# Patient Record
Sex: MALE | Race: WHITE | Employment: OTHER | URBAN - METROPOLITAN AREA
[De-identification: names, ages, dates, MRNs, and addresses within clinical notes are randomized per-mention and may not be internally consistent; named-entity substitution may affect disease eponyms.]

---

## 2019-08-08 LAB — DIABETIC RETINOPATHY: NEGATIVE

## 2020-05-07 LAB
AVERAGE GLUCOSE: NORMAL
HBA1C MFR BLD: 6.1 %

## 2020-06-15 ENCOUNTER — HOSPITAL ENCOUNTER (EMERGENCY)
Age: 60
Discharge: HOME | End: 2020-06-15
Payer: MEDICARE

## 2020-06-15 VITALS
BODY MASS INDEX: 27.16 KG/M2 | OXYGEN SATURATION: 99 % | HEART RATE: 95 BPM | WEIGHT: 194 LBS | SYSTOLIC BLOOD PRESSURE: 153 MMHG | HEIGHT: 71 IN | DIASTOLIC BLOOD PRESSURE: 82 MMHG | RESPIRATION RATE: 18 BRPM | TEMPERATURE: 97.6 F

## 2020-06-15 PROCEDURE — 6370000000 HC RX 637 (ALT 250 FOR IP): Performed by: PHYSICIAN ASSISTANT

## 2020-06-15 PROCEDURE — 99283 EMERGENCY DEPT VISIT LOW MDM: CPT

## 2020-06-15 PROCEDURE — 30903 CONTROL OF NOSEBLEED: CPT

## 2020-06-15 RX ORDER — ASPIRIN 81 MG/1
81 TABLET ORAL DAILY
COMMUNITY

## 2020-06-15 RX ORDER — OXYMETAZOLINE HYDROCHLORIDE 0.05 G/100ML
2 SPRAY NASAL ONCE
Status: COMPLETED | OUTPATIENT
Start: 2020-06-15 | End: 2020-06-15

## 2020-06-15 RX ORDER — HYDROCHLOROTHIAZIDE 25 MG/1
25 TABLET ORAL DAILY
COMMUNITY
End: 2022-06-17 | Stop reason: SDUPTHER

## 2020-06-15 RX ORDER — DOXYCYCLINE HYCLATE 100 MG
100 TABLET ORAL 2 TIMES DAILY
Qty: 14 TABLET | Refills: 0 | Status: SHIPPED | OUTPATIENT
Start: 2020-06-15 | End: 2020-06-22

## 2020-06-15 RX ADMIN — Medication 2 SPRAY: at 19:46

## 2020-06-15 NOTE — ED PROVIDER NOTES
Medications     Discharge Medication List as of 6/15/2020  8:35 PM      START taking these medications    Details   doxycycline hyclate (VIBRA-TABS) 100 MG tablet Take 1 tablet by mouth 2 times daily for 7 days, Disp-14 tablet, R-0Print           Electronically signed by Darrius Sosa PA-C   DD: 6/15/20  **This report was transcribed using voice recognition software. Every effort was made to ensure accuracy; however, inadvertent computerized transcription errors may be present.   END OF ED PROVIDER NOTE        Darrius Sosa PA-C  06/15/20 4910

## 2020-06-15 NOTE — ED NOTES
Bed: 25  Expected date:   Expected time:   Means of arrival:   Comments:  [address], RN  06/15/20 7304

## 2020-06-16 ENCOUNTER — TELEPHONE (OUTPATIENT)
Dept: ADMINISTRATIVE | Age: 60
End: 2020-06-16

## 2020-06-16 NOTE — TELEPHONE ENCOUNTER
Patient seen in Ed for nose bleeds had a balloon placed inside and needs seen with in 2-3 days.  Best call back is [phone]

## 2020-06-18 ENCOUNTER — OFFICE VISIT (OUTPATIENT)
Dept: ENT CLINIC | Age: 60
End: 2020-06-18
Payer: MEDICARE

## 2020-06-18 VITALS — BODY MASS INDEX: 27.16 KG/M2 | TEMPERATURE: 98.6 F | HEIGHT: 71 IN | WEIGHT: 194 LBS

## 2020-06-18 PROCEDURE — 1036F TOBACCO NON-USER: CPT | Performed by: OTOLARYNGOLOGY

## 2020-06-18 PROCEDURE — G8419 CALC BMI OUT NRM PARAM NOF/U: HCPCS | Performed by: OTOLARYNGOLOGY

## 2020-06-18 PROCEDURE — 3017F COLORECTAL CA SCREEN DOC REV: CPT | Performed by: OTOLARYNGOLOGY

## 2020-06-18 PROCEDURE — G8427 DOCREV CUR MEDS BY ELIG CLIN: HCPCS | Performed by: OTOLARYNGOLOGY

## 2020-06-18 PROCEDURE — 99204 OFFICE O/P NEW MOD 45 MIN: CPT | Performed by: OTOLARYNGOLOGY

## 2020-06-18 SDOH — HEALTH STABILITY: MENTAL HEALTH: HOW MANY STANDARD DRINKS CONTAINING ALCOHOL DO YOU HAVE ON A TYPICAL DAY?: 5 OR 6

## 2020-06-18 SDOH — HEALTH STABILITY: MENTAL HEALTH: HOW OFTEN DO YOU HAVE A DRINK CONTAINING ALCOHOL?: 4 OR MORE TIMES A WEEK

## 2020-06-18 ASSESSMENT — ENCOUNTER SYMPTOMS
VOMITING: 0
ALLERGIC/IMMUNOLOGIC NEGATIVE: 1
NAUSEA: 0
EYE REDNESS: 0
COLOR CHANGE: 0
STRIDOR: 0
SHORTNESS OF BREATH: 0
COUGH: 0
EYE DISCHARGE: 0

## 2020-08-07 LAB
AVERAGE GLUCOSE: NORMAL
HBA1C MFR BLD: 6.2 %

## 2020-11-06 LAB
AVERAGE GLUCOSE: NORMAL
HBA1C MFR BLD: 6.5 %

## 2020-12-26 ENCOUNTER — APPOINTMENT (OUTPATIENT)
Dept: GENERAL RADIOLOGY | Age: 60
DRG: 871 | End: 2020-12-26
Payer: MEDICARE

## 2020-12-26 ENCOUNTER — HOSPITAL ENCOUNTER (INPATIENT)
Age: 60
LOS: 10 days | Discharge: ANOTHER HOSPITAL | DRG: 871 | End: 2021-01-05
Attending: EMERGENCY MEDICINE | Admitting: INTERNAL MEDICINE
Payer: MEDICARE

## 2020-12-26 DIAGNOSIS — E87.1 HYPONATREMIA: Primary | ICD-10-CM

## 2020-12-26 DIAGNOSIS — R29.6 FREQUENT FALLS: ICD-10-CM

## 2020-12-26 LAB
ANION GAP SERPL CALCULATED.3IONS-SCNC: 11 MMOL/L (ref 7–16)
ANION GAP SERPL CALCULATED.3IONS-SCNC: 12 MMOL/L (ref 7–16)
ANION GAP SERPL CALCULATED.3IONS-SCNC: 12 MMOL/L (ref 7–16)
ANION GAP SERPL CALCULATED.3IONS-SCNC: 14 MMOL/L (ref 7–16)
ANION GAP SERPL CALCULATED.3IONS-SCNC: 20 MMOL/L (ref 7–16)
BUN BLDV-MCNC: 10 MG/DL (ref 8–23)
BUN BLDV-MCNC: 10 MG/DL (ref 8–23)
BUN BLDV-MCNC: 13 MG/DL (ref 8–23)
BUN BLDV-MCNC: 23 MG/DL (ref 8–23)
BUN BLDV-MCNC: 9 MG/DL (ref 8–23)
CALCIUM SERPL-MCNC: 7.9 MG/DL (ref 8.6–10.2)
CALCIUM SERPL-MCNC: 8.1 MG/DL (ref 8.6–10.2)
CALCIUM SERPL-MCNC: 8.8 MG/DL (ref 8.6–10.2)
CHLORIDE BLD-SCNC: 63 MMOL/L (ref 98–107)
CHLORIDE BLD-SCNC: 67 MMOL/L (ref 98–107)
CHLORIDE BLD-SCNC: 68 MMOL/L (ref 98–107)
CHLORIDE BLD-SCNC: 71 MMOL/L (ref 98–107)
CHLORIDE BLD-SCNC: 74 MMOL/L (ref 98–107)
CHP ED QC CHECK: YES
CHP ED QC CHECK: YES
CK MB: 22.1 NG/ML (ref 0–7.7)
CK MB: 27.7 NG/ML (ref 0–7.7)
CO2: 19 MMOL/L (ref 22–29)
CO2: 20 MMOL/L (ref 22–29)
CO2: 22 MMOL/L (ref 22–29)
CREAT SERPL-MCNC: 0.6 MG/DL (ref 0.7–1.2)
CREAT SERPL-MCNC: 0.7 MG/DL (ref 0.7–1.2)
CREAT SERPL-MCNC: 0.7 MG/DL (ref 0.7–1.2)
CREAT SERPL-MCNC: 0.8 MG/DL (ref 0.7–1.2)
CREAT SERPL-MCNC: 1.3 MG/DL (ref 0.7–1.2)
EKG ATRIAL RATE: 92 BPM
EKG P AXIS: 19 DEGREES
EKG P-R INTERVAL: 176 MS
EKG Q-T INTERVAL: 388 MS
EKG QRS DURATION: 80 MS
EKG QTC CALCULATION (BAZETT): 479 MS
EKG R AXIS: 19 DEGREES
EKG T AXIS: 17 DEGREES
EKG VENTRICULAR RATE: 92 BPM
GFR AFRICAN AMERICAN: >60
GFR NON-AFRICAN AMERICAN: 56 ML/MIN/1.73
GFR NON-AFRICAN AMERICAN: >60 ML/MIN/1.73
GLUCOSE BLD-MCNC: 144 MG/DL (ref 74–99)
GLUCOSE BLD-MCNC: 181 MG/DL (ref 74–99)
GLUCOSE BLD-MCNC: 187 MG/DL (ref 74–99)
GLUCOSE BLD-MCNC: 190 MG/DL (ref 74–99)
GLUCOSE BLD-MCNC: 192 MG/DL
GLUCOSE BLD-MCNC: 199 MG/DL (ref 74–99)
GLUCOSE BLD-MCNC: 207 MG/DL
HBA1C MFR BLD: 5.5 % (ref 4–5.6)
HCT VFR BLD CALC: 25.5 % (ref 37–54)
HCT VFR BLD CALC: 29.1 % (ref 37–54)
HEMOGLOBIN: 10.6 G/DL (ref 12.5–16.5)
HEMOGLOBIN: 9.4 G/DL (ref 12.5–16.5)
LACTIC ACID: 4.9 MMOL/L (ref 0.5–2.2)
MCH RBC QN AUTO: 28.5 PG (ref 26–35)
MCH RBC QN AUTO: 28.8 PG (ref 26–35)
MCHC RBC AUTO-ENTMCNC: 36.4 % (ref 32–34.5)
MCHC RBC AUTO-ENTMCNC: 36.9 % (ref 32–34.5)
MCV RBC AUTO: 78.2 FL (ref 80–99.9)
MCV RBC AUTO: 78.2 FL (ref 80–99.9)
METER GLUCOSE: 192 MG/DL (ref 74–99)
METER GLUCOSE: 207 MG/DL (ref 74–99)
METER GLUCOSE: 210 MG/DL (ref 74–99)
OSMOLALITY URINE: 565 MOSM/KG (ref 300–900)
OSMOLALITY URINE: 602 MOSM/KG (ref 300–900)
OSMOLALITY: 226 MOSM/KG (ref 285–310)
PDW BLD-RTO: 13.4 FL (ref 11.5–15)
PDW BLD-RTO: 13.6 FL (ref 11.5–15)
PLATELET # BLD: 160 E9/L (ref 130–450)
PLATELET # BLD: 162 E9/L (ref 130–450)
PMV BLD AUTO: 9.2 FL (ref 7–12)
PMV BLD AUTO: 9.8 FL (ref 7–12)
POTASSIUM REFLEX MAGNESIUM: 3.7 MMOL/L (ref 3.5–5)
POTASSIUM REFLEX MAGNESIUM: 3.8 MMOL/L (ref 3.5–5)
POTASSIUM REFLEX MAGNESIUM: 3.9 MMOL/L (ref 3.5–5)
POTASSIUM SERPL-SCNC: 3.7 MMOL/L (ref 3.5–5)
POTASSIUM SERPL-SCNC: 3.9 MMOL/L (ref 3.5–5)
PRO-BNP: 824 PG/ML (ref 0–125)
RBC # BLD: 3.26 E12/L (ref 3.8–5.8)
RBC # BLD: 3.72 E12/L (ref 3.8–5.8)
SARS-COV-2, NAAT: NOT DETECTED
SODIUM BLD-SCNC: 102 MMOL/L (ref 132–146)
SODIUM BLD-SCNC: 102 MMOL/L (ref 132–146)
SODIUM BLD-SCNC: 103 MMOL/L (ref 132–146)
SODIUM BLD-SCNC: 104 MMOL/L (ref 132–146)
SODIUM BLD-SCNC: 105 MMOL/L (ref 132–146)
SODIUM BLD-SCNC: 105 MMOL/L (ref 132–146)
SODIUM BLD-SCNC: 106 MMOL/L (ref 132–146)
SODIUM BLD-SCNC: 106 MMOL/L (ref 132–146)
SODIUM URINE: 30 MMOL/L
SODIUM URINE: <20 MMOL/L
TOTAL CK: 2273 U/L (ref 20–200)
TOTAL CK: 3059 U/L (ref 20–200)
TROPONIN: 0.06 NG/ML (ref 0–0.03)
TROPONIN: 0.07 NG/ML (ref 0–0.03)
TROPONIN: 0.1 NG/ML (ref 0–0.03)
TSH SERPL DL<=0.05 MIU/L-ACNC: 0.43 UIU/ML (ref 0.27–4.2)
URIC ACID, SERUM: 3.3 MG/DL (ref 3.4–7)
WBC # BLD: 11.7 E9/L (ref 4.5–11.5)
WBC # BLD: 14.2 E9/L (ref 4.5–11.5)

## 2020-12-26 PROCEDURE — 83036 HEMOGLOBIN GLYCOSYLATED A1C: CPT

## 2020-12-26 PROCEDURE — 84443 ASSAY THYROID STIM HORMONE: CPT

## 2020-12-26 PROCEDURE — 6360000002 HC RX W HCPCS: Performed by: EMERGENCY MEDICINE

## 2020-12-26 PROCEDURE — 82962 GLUCOSE BLOOD TEST: CPT

## 2020-12-26 PROCEDURE — 84295 ASSAY OF SERUM SODIUM: CPT

## 2020-12-26 PROCEDURE — 83935 ASSAY OF URINE OSMOLALITY: CPT

## 2020-12-26 PROCEDURE — 82550 ASSAY OF CK (CPK): CPT

## 2020-12-26 PROCEDURE — 73030 X-RAY EXAM OF SHOULDER: CPT

## 2020-12-26 PROCEDURE — 6370000000 HC RX 637 (ALT 250 FOR IP): Performed by: INTERNAL MEDICINE

## 2020-12-26 PROCEDURE — 99223 1ST HOSP IP/OBS HIGH 75: CPT | Performed by: INTERNAL MEDICINE

## 2020-12-26 PROCEDURE — 2580000003 HC RX 258: Performed by: EMERGENCY MEDICINE

## 2020-12-26 PROCEDURE — 6370000000 HC RX 637 (ALT 250 FOR IP): Performed by: NURSE PRACTITIONER

## 2020-12-26 PROCEDURE — 2000000000 HC ICU R&B

## 2020-12-26 PROCEDURE — 83880 ASSAY OF NATRIURETIC PEPTIDE: CPT

## 2020-12-26 PROCEDURE — 6360000002 HC RX W HCPCS

## 2020-12-26 PROCEDURE — 71046 X-RAY EXAM CHEST 2 VIEWS: CPT

## 2020-12-26 PROCEDURE — 96374 THER/PROPH/DIAG INJ IV PUSH: CPT

## 2020-12-26 PROCEDURE — 84484 ASSAY OF TROPONIN QUANT: CPT

## 2020-12-26 PROCEDURE — 36415 COLL VENOUS BLD VENIPUNCTURE: CPT

## 2020-12-26 PROCEDURE — 93005 ELECTROCARDIOGRAM TRACING: CPT | Performed by: EMERGENCY MEDICINE

## 2020-12-26 PROCEDURE — 87081 CULTURE SCREEN ONLY: CPT

## 2020-12-26 PROCEDURE — 2580000003 HC RX 258: Performed by: INTERNAL MEDICINE

## 2020-12-26 PROCEDURE — 83605 ASSAY OF LACTIC ACID: CPT

## 2020-12-26 PROCEDURE — 84550 ASSAY OF BLOOD/URIC ACID: CPT

## 2020-12-26 PROCEDURE — 84300 ASSAY OF URINE SODIUM: CPT

## 2020-12-26 PROCEDURE — 87040 BLOOD CULTURE FOR BACTERIA: CPT

## 2020-12-26 PROCEDURE — 85027 COMPLETE CBC AUTOMATED: CPT

## 2020-12-26 PROCEDURE — 83930 ASSAY OF BLOOD OSMOLALITY: CPT

## 2020-12-26 PROCEDURE — U0002 COVID-19 LAB TEST NON-CDC: HCPCS

## 2020-12-26 PROCEDURE — 80048 BASIC METABOLIC PNL TOTAL CA: CPT

## 2020-12-26 PROCEDURE — 99221 1ST HOSP IP/OBS SF/LOW 40: CPT | Performed by: INTERNAL MEDICINE

## 2020-12-26 PROCEDURE — 6360000002 HC RX W HCPCS: Performed by: INTERNAL MEDICINE

## 2020-12-26 PROCEDURE — 99285 EMERGENCY DEPT VISIT HI MDM: CPT

## 2020-12-26 PROCEDURE — 82553 CREATINE MB FRACTION: CPT

## 2020-12-26 RX ORDER — ASPIRIN 81 MG/1
81 TABLET ORAL DAILY
Status: DISCONTINUED | OUTPATIENT
Start: 2020-12-26 | End: 2021-01-05 | Stop reason: HOSPADM

## 2020-12-26 RX ORDER — ONDANSETRON 2 MG/ML
INJECTION INTRAMUSCULAR; INTRAVENOUS
Status: COMPLETED
Start: 2020-12-26 | End: 2020-12-26

## 2020-12-26 RX ORDER — PANTOPRAZOLE SODIUM 40 MG/1
40 TABLET, DELAYED RELEASE ORAL
Status: DISCONTINUED | OUTPATIENT
Start: 2020-12-27 | End: 2020-12-27

## 2020-12-26 RX ORDER — LORAZEPAM 2 MG/ML
2 INJECTION INTRAMUSCULAR
Status: DISCONTINUED | OUTPATIENT
Start: 2020-12-26 | End: 2020-12-30

## 2020-12-26 RX ORDER — SODIUM CHLORIDE 0.9 % (FLUSH) 0.9 %
10 SYRINGE (ML) INJECTION EVERY 12 HOURS SCHEDULED
Status: DISCONTINUED | OUTPATIENT
Start: 2020-12-26 | End: 2021-01-05 | Stop reason: HOSPADM

## 2020-12-26 RX ORDER — PROMETHAZINE HYDROCHLORIDE 25 MG/1
12.5 TABLET ORAL EVERY 6 HOURS PRN
Status: DISCONTINUED | OUTPATIENT
Start: 2020-12-26 | End: 2021-01-05 | Stop reason: HOSPADM

## 2020-12-26 RX ORDER — LORAZEPAM 2 MG/ML
3 INJECTION INTRAMUSCULAR
Status: DISCONTINUED | OUTPATIENT
Start: 2020-12-26 | End: 2020-12-30

## 2020-12-26 RX ORDER — LORAZEPAM 2 MG/ML
1 INJECTION INTRAMUSCULAR
Status: DISCONTINUED | OUTPATIENT
Start: 2020-12-26 | End: 2020-12-30

## 2020-12-26 RX ORDER — 3% SODIUM CHLORIDE 3 G/100ML
35 INJECTION, SOLUTION INTRAVENOUS CONTINUOUS
Status: DISCONTINUED | OUTPATIENT
Start: 2020-12-26 | End: 2020-12-27

## 2020-12-26 RX ORDER — CLOPIDOGREL BISULFATE 75 MG/1
75 TABLET ORAL DAILY
Status: DISCONTINUED | OUTPATIENT
Start: 2020-12-26 | End: 2021-01-05 | Stop reason: HOSPADM

## 2020-12-26 RX ORDER — DEXTROSE MONOHYDRATE 50 MG/ML
100 INJECTION, SOLUTION INTRAVENOUS PRN
Status: DISCONTINUED | OUTPATIENT
Start: 2020-12-26 | End: 2020-12-27 | Stop reason: SDUPTHER

## 2020-12-26 RX ORDER — SODIUM CHLORIDE 9 MG/ML
INJECTION, SOLUTION INTRAVENOUS CONTINUOUS
Status: DISCONTINUED | OUTPATIENT
Start: 2020-12-26 | End: 2020-12-27

## 2020-12-26 RX ORDER — GAUZE BANDAGE 2" X 2"
100 BANDAGE TOPICAL DAILY
Status: DISCONTINUED | OUTPATIENT
Start: 2020-12-26 | End: 2021-01-05 | Stop reason: HOSPADM

## 2020-12-26 RX ORDER — ACETAMINOPHEN 325 MG/1
650 TABLET ORAL EVERY 6 HOURS PRN
Status: DISCONTINUED | OUTPATIENT
Start: 2020-12-26 | End: 2021-01-05 | Stop reason: HOSPADM

## 2020-12-26 RX ORDER — LORAZEPAM 2 MG/ML
4 INJECTION INTRAMUSCULAR
Status: DISCONTINUED | OUTPATIENT
Start: 2020-12-26 | End: 2020-12-30

## 2020-12-26 RX ORDER — MAGNESIUM HYDROXIDE/ALUMINUM HYDROXICE/SIMETHICONE 120; 1200; 1200 MG/30ML; MG/30ML; MG/30ML
15 SUSPENSION ORAL ONCE
Status: COMPLETED | OUTPATIENT
Start: 2020-12-26 | End: 2020-12-26

## 2020-12-26 RX ORDER — ACETAMINOPHEN 650 MG/1
650 SUPPOSITORY RECTAL EVERY 6 HOURS PRN
Status: DISCONTINUED | OUTPATIENT
Start: 2020-12-26 | End: 2021-01-05 | Stop reason: HOSPADM

## 2020-12-26 RX ORDER — ONDANSETRON 2 MG/ML
4 INJECTION INTRAMUSCULAR; INTRAVENOUS EVERY 6 HOURS PRN
Status: DISCONTINUED | OUTPATIENT
Start: 2020-12-26 | End: 2021-01-05 | Stop reason: HOSPADM

## 2020-12-26 RX ORDER — GLIMEPIRIDE 2 MG/1
2 TABLET ORAL
Status: DISCONTINUED | OUTPATIENT
Start: 2020-12-26 | End: 2020-12-27

## 2020-12-26 RX ORDER — DEXTROSE MONOHYDRATE 25 G/50ML
12.5 INJECTION, SOLUTION INTRAVENOUS PRN
Status: DISCONTINUED | OUTPATIENT
Start: 2020-12-26 | End: 2020-12-27 | Stop reason: SDUPTHER

## 2020-12-26 RX ORDER — 0.9 % SODIUM CHLORIDE 0.9 %
1000 INTRAVENOUS SOLUTION INTRAVENOUS ONCE
Status: COMPLETED | OUTPATIENT
Start: 2020-12-26 | End: 2020-12-26

## 2020-12-26 RX ORDER — LORAZEPAM 1 MG/1
3 TABLET ORAL
Status: DISCONTINUED | OUTPATIENT
Start: 2020-12-26 | End: 2020-12-30

## 2020-12-26 RX ORDER — LORAZEPAM 1 MG/1
2 TABLET ORAL
Status: DISCONTINUED | OUTPATIENT
Start: 2020-12-26 | End: 2020-12-30

## 2020-12-26 RX ORDER — ATORVASTATIN CALCIUM 20 MG/1
40 TABLET, FILM COATED ORAL NIGHTLY
Status: DISCONTINUED | OUTPATIENT
Start: 2020-12-26 | End: 2021-01-05 | Stop reason: HOSPADM

## 2020-12-26 RX ORDER — NICOTINE POLACRILEX 4 MG
15 LOZENGE BUCCAL PRN
Status: DISCONTINUED | OUTPATIENT
Start: 2020-12-26 | End: 2020-12-27 | Stop reason: SDUPTHER

## 2020-12-26 RX ORDER — LORAZEPAM 1 MG/1
1 TABLET ORAL
Status: DISCONTINUED | OUTPATIENT
Start: 2020-12-26 | End: 2020-12-30

## 2020-12-26 RX ORDER — HYDROCHLOROTHIAZIDE 25 MG/1
25 TABLET ORAL DAILY
Status: DISCONTINUED | OUTPATIENT
Start: 2020-12-26 | End: 2020-12-26

## 2020-12-26 RX ORDER — LORAZEPAM 1 MG/1
4 TABLET ORAL
Status: DISCONTINUED | OUTPATIENT
Start: 2020-12-26 | End: 2020-12-30

## 2020-12-26 RX ORDER — POLYETHYLENE GLYCOL 3350 17 G/17G
17 POWDER, FOR SOLUTION ORAL DAILY PRN
Status: DISCONTINUED | OUTPATIENT
Start: 2020-12-26 | End: 2021-01-05 | Stop reason: HOSPADM

## 2020-12-26 RX ORDER — SODIUM CHLORIDE 0.9 % (FLUSH) 0.9 %
10 SYRINGE (ML) INJECTION PRN
Status: DISCONTINUED | OUTPATIENT
Start: 2020-12-26 | End: 2020-12-28 | Stop reason: SDUPTHER

## 2020-12-26 RX ADMIN — ONDANSETRON 4 MG: 2 INJECTION INTRAMUSCULAR; INTRAVENOUS at 06:06

## 2020-12-26 RX ADMIN — GLIMEPIRIDE 2 MG: 2 TABLET ORAL at 09:02

## 2020-12-26 RX ADMIN — ENOXAPARIN SODIUM 40 MG: 40 INJECTION SUBCUTANEOUS at 09:03

## 2020-12-26 RX ADMIN — ATORVASTATIN CALCIUM 40 MG: 20 TABLET, FILM COATED ORAL at 20:49

## 2020-12-26 RX ADMIN — HYDROCHLOROTHIAZIDE 25 MG: 25 TABLET ORAL at 09:02

## 2020-12-26 RX ADMIN — LORAZEPAM 3 MG: 2 INJECTION INTRAMUSCULAR; INTRAVENOUS at 23:26

## 2020-12-26 RX ADMIN — LORAZEPAM 2 MG: 2 INJECTION INTRAMUSCULAR; INTRAVENOUS at 22:26

## 2020-12-26 RX ADMIN — ACETAMINOPHEN 650 MG: 650 SUPPOSITORY RECTAL at 23:25

## 2020-12-26 RX ADMIN — ONDANSETRON HYDROCHLORIDE 4 MG: 2 SOLUTION INTRAMUSCULAR; INTRAVENOUS at 06:06

## 2020-12-26 RX ADMIN — THIAMINE HCL TAB 100 MG 100 MG: 100 TAB at 09:02

## 2020-12-26 RX ADMIN — METFORMIN HYDROCHLORIDE 500 MG: 500 TABLET ORAL at 09:02

## 2020-12-26 RX ADMIN — CLOPIDOGREL BISULFATE 75 MG: 75 TABLET ORAL at 09:02

## 2020-12-26 RX ADMIN — METFORMIN HYDROCHLORIDE 500 MG: 500 TABLET ORAL at 17:37

## 2020-12-26 RX ADMIN — VANCOMYCIN HYDROCHLORIDE 1000 MG: 1 INJECTION, POWDER, LYOPHILIZED, FOR SOLUTION INTRAVENOUS at 05:15

## 2020-12-26 RX ADMIN — SODIUM CHLORIDE: 9 INJECTION, SOLUTION INTRAVENOUS at 05:28

## 2020-12-26 RX ADMIN — CEFEPIME HYDROCHLORIDE 2 G: 2 INJECTION, POWDER, FOR SOLUTION INTRAVENOUS at 04:02

## 2020-12-26 RX ADMIN — ASPIRIN 81 MG: 81 TABLET, FILM COATED ORAL at 09:02

## 2020-12-26 RX ADMIN — Medication 10 ML: at 20:49

## 2020-12-26 RX ADMIN — ALUMINUM HYDROXIDE, MAGNESIUM HYDROXIDE, AND SIMETHICONE 15 ML: 200; 200; 20 SUSPENSION ORAL at 12:15

## 2020-12-26 RX ADMIN — SODIUM CHLORIDE 1000 ML: 9 INJECTION, SOLUTION INTRAVENOUS at 02:34

## 2020-12-26 RX ADMIN — SODIUM CHLORIDE 35 ML/HR: 3 INJECTION, SOLUTION INTRAVENOUS at 14:39

## 2020-12-26 ASSESSMENT — ENCOUNTER SYMPTOMS
ABDOMINAL PAIN: 0
BACK PAIN: 0
EYE DISCHARGE: 0
COUGH: 0
WHEEZING: 0
NAUSEA: 1
SORE THROAT: 0
EYE PAIN: 0
SINUS PRESSURE: 0
EYE REDNESS: 0
VOMITING: 1
SHORTNESS OF BREATH: 1
DIARRHEA: 0

## 2020-12-26 ASSESSMENT — PAIN SCALES - GENERAL
PAINLEVEL_OUTOF10: 0
PAINLEVEL_OUTOF10: 8
PAINLEVEL_OUTOF10: 0

## 2020-12-26 ASSESSMENT — PAIN DESCRIPTION - LOCATION: LOCATION: CHEST

## 2020-12-26 ASSESSMENT — PAIN DESCRIPTION - DESCRIPTORS: DESCRIPTORS: OTHER (COMMENT)

## 2020-12-26 NOTE — H&P
[address] [place], [place] [address]ist Group   History and Physical      CHIEF COMPLAINT: History of fall at home    History of Present Illness:  63 y.o. male with a history of CVA with left-sided weakness, diabetes mellitus and hyperlipidemia presents with a fall at home. History taken from the patient at the bedside, he fall yesterday evening and hit his upper cheek. He did not give any history of headache. Did not give any history of fever, cough or any chest pain. His vitals in ER blood pressure 146/77, pulse 95, respiration 24, temperature 98 °F.  His labs in ER shows sodium 102, chloride 63 and bicarb 19, creatinine 0.6 and WBC count 11.7. Informant(s) for H&P: Patient and EMR    REVIEW OF SYSTEMS:  no fevers, chills, cp, sob, n/v, ha, vision/hearing changes, wt changes, hot/cold flashes, other open skin lesions, diarrhea, constipation, dysuria/hematuria unless noted in HPI. Complete ROS performed with the patient and is otherwise negative. PMH:  Past Medical History:   Diagnosis Date    [facility]erebral [facility] occlusion with [facility] infarction ([facility]     Diabetes mellitus ([facility]     Type 2    Hypertension        Surgical History:  Past Surgical History:   Procedure Laterality Date    FINGER AMPUTATION      KNEE ARTHROSCOPY      TONSILLECTOMY         Medications Prior to Admission:    Prior to Admission medications    Medication Sig Start Date End Date Taking?  Authorizing Provider   aspirin 81 MG EC tablet Take 81 mg by mouth daily    Historical Provider, MD   hydroCHLOROthiazide (HYDRODIURIL) 25 MG tablet Take 25 mg by mouth daily    Historical Provider, MD   acetaminophen 650 MG TABS Take 650 mg by mouth every 4 hours as needed 11/16/15   Deana Rm, DO   glimepiride (AMARYL) 2 MG tablet Take 1 tablet by mouth daily (with breakfast) 11/16/15   Deana Rm, DO   insulin lispro (HUMALOG) 100 UNIT/ML injection vial Inject 0-12 Units into the skin 3 times daily (with meals) Patient not taking: Reported on 6/18/2020 11/16/15   Ariela Lawler, DO   insulin lispro (HUMALOG) 100 UNIT/ML injection vial Inject 0-6 Units into the skin nightly  Patient not taking: Reported on 6/18/2020 11/16/15   Ariela Lawler, DO   metFORMIN (GLUCOPHAGE) 500 MG tablet Take 1 tablet by mouth 2 times daily (with meals) 11/16/15   Ariela Lawler, DO   atorvastatin (LIPITOR) 40 MG tablet Take 1 tablet by mouth nightly 11/16/15   Ariela Lawler, DO   clopidogrel (PLAVIX) 75 MG tablet Take 1 tablet by mouth daily  Patient not taking: Reported on 6/18/2020 11/16/15   Ariela Lawler, DO   vitamin B-1 100 MG tablet Take 1 tablet by mouth daily  Patient not taking: Reported on 6/18/2020 11/16/15   Ariela Lawler DO       Allergies:    Pcn [penicillins]    Social History:    reports that he has quit smoking. He has never used smokeless tobacco. He reports current alcohol use. He reports that he does not use drugs. Family History:   family history is not on file. Significant medical history in his family    PHYSICAL EXAM:  Vitals:  BP (!) 146/77   Pulse 95   Temp 98 °F (36.7 °C) (Oral)   Resp 24   SpO2 96%     General Appearance: alert and oriented to person, place and time and in no acute distress  Skin: Bruits and the zygomatic process left side.   Head: normocephalic and atraumatic  Eyes: pupils equal, round, and reactive to light, extraocular eye movements intact, conjunctivae normal  Neck: neck supple and non tender without mass   Pulmonary/Chest: clear to auscultation bilaterally- no wheezes, rales or rhonchi, normal air movement, no respiratory distress  Cardiovascular: normal rate, normal S1 and S2 and no carotid bruits  Abdomen: soft, non-tender, non-distended, normal bowel sounds, no masses or organomegaly  Extremities: no cyanosis, no clubbing and no edema  Neurologic: no cranial nerve deficit and speech normal    LABS:  Recent Labs     12/26/20  0124   *   K 3.7   CL 63* CO2 19*   BUN 9   CREATININE 0.6*   GLUCOSE 144*   CALCIUM 8.8       Recent Labs     12/26/20  0124   WBC 11.7*   RBC 3.72*   HGB 10.6*   HCT 29.1*   MCV 78.2*   MCH 28.5   MCHC 36.4*   RDW 13.4      MPV 9.2       No results for input(s): POCGLU in the last 72 hours. Radiology: Xr Chest (2 Vw)    Result Date: 12/26/2020  EXAMINATION: TWO XRAY VIEWS OF THE CHEST 12/26/2020 1:27 am COMPARISON: 11/11/2015 HISTORY: ORDERING SYSTEM PROVIDED HISTORY: shortness of breath TECHNOLOGIST PROVIDED HISTORY: Reason for exam:->shortness of breath FINDINGS: Cardiac silhouette top-normal.  Lungs grossly clear. No sizable pleural effusion. No pneumothorax. Multilevel thoracic spondylosis. Multiple old healed left rib fractures noted. No acute cardiopulmonary process. Xr Shoulder Left (min 2 Views)    Result Date: 12/26/2020  EXAMINATION: THREE XRAY VIEWS OF THE LEFT SHOULDER 12/26/2020 1:27 am COMPARISON: None. HISTORY: ORDERING SYSTEM PROVIDED HISTORY: fall TECHNOLOGIST PROVIDED HISTORY: Reason for exam:->fall FINDINGS: There is no acute fracture or dislocation in the left shoulder. The bones are somewhat osteopenic. Degenerative changes of glenohumeral joint are seen. The humeral head is high-riding, suggestive of chronic rotator cuff tear. Multiple old healed left rib fractures are seen. No acute fracture or dislocation in the left shoulder. EKG: Sinus rhythm with artifact    ASSESSMENT:      Active Problems:    * No active hospital problems. *  Resolved Problems:    * No resolved hospital problems. *      PLAN:    1. Severe hyponatremia: He did not give any history of seizure, he is alert oriented, I will not start 3% sodium chloride now continue normal saline and follow-up BMP every 4 hours. 2.  CVA with left-sided weakness: Continue Lipitor, aspirin and Plavix. 3.  Diabetes mellitus: Continue Metformin and glimepiride, insulin coverage.

## 2020-12-26 NOTE — ED NOTES
The patient has bruising under the left eye, reddish in color. There is yellowish bruising to the left shoulder, and purplish red discoloration to the entire left arm with swelling to that arm. Left radial pulse is present. There is a band aid to the left mid forearm want has blood weeping through it.      Angelica Camp RN  12/26/20 0012

## 2020-12-26 NOTE — ED PROVIDER NOTES
Patient is a 62 y/o male who presents to the ED via EMS with multiple falls and shortness of breath. Patient states that he has fallen multiple times over the past week. Tonight, he fell from standing landing onto his left shoulder. He denies any dizziness or syncope. He denies hitting his head or any loss of consciousness. He denies any current pain. He states that he is short of breath. He states that he vomited tonight and choked on the emesis. He denies any recent fever. He denies any chest pain or abdominal pain. He states that he fell because his left leg locked up. Review of Systems   Constitutional: Negative for chills and fever. HENT: Negative for ear pain, sinus pressure and sore throat. Eyes: Negative for pain, discharge and redness. Respiratory: Positive for shortness of breath. Negative for cough and wheezing. Cardiovascular: Negative for chest pain. Gastrointestinal: Positive for nausea and vomiting. Negative for abdominal pain and diarrhea. Genitourinary: Negative for dysuria and frequency. Musculoskeletal: Negative for arthralgias and back pain. Skin: Negative for rash and wound. Neurological: Negative for weakness and headaches. Hematological: Negative for adenopathy. All other systems reviewed and are negative. Physical Exam  Vitals signs and nursing note reviewed. Constitutional:       General: He is not in acute distress. HENT:      Head: Normocephalic. Mouth/Throat:      Mouth: Mucous membranes are moist.   Eyes:      Pupils: Pupils are equal, round, and reactive to light. Neck:      Musculoskeletal: Normal range of motion and neck supple. Comments: No midline tenderness to palpation. Cardiovascular:      Rate and Rhythm: Normal rate and regular rhythm. Heart sounds: No murmur.    Pulmonary:      Effort: Pulmonary effort is normal. Breath sounds: Examination of the right-upper field reveals rhonchi. Examination of the left-upper field reveals rhonchi. Examination of the right-middle field reveals rhonchi. Examination of the left-middle field reveals rhonchi. Examination of the right-lower field reveals rhonchi. Examination of the left-lower field reveals rhonchi. Rhonchi present. No wheezing or rales. Abdominal:      General: Bowel sounds are normal.      Palpations: Abdomen is soft. Tenderness: There is no abdominal tenderness. There is no guarding. Musculoskeletal: Normal range of motion. Left shoulder: He exhibits no tenderness, no bony tenderness, no swelling, no effusion, no crepitus and no deformity. Skin:     General: Skin is warm and dry. Findings: Ecchymosis (Anterior left shoulder appears old.) present. Neurological:      Mental Status: He is alert and oriented to person, place, and time. Procedures     MDM              --------------------------------------------- PAST HISTORY ---------------------------------------------  Past Medical History:  has a past medical history of Cerebral artery occlusion with cerebral infarction ([facility] Diabetes mellitus ([facility] and Hypertension. Past Surgical History:  has a past surgical history that includes Tonsillectomy; Knee arthroscopy; and Finger amputation. Social History:  reports that he has quit smoking. He has never used smokeless tobacco. He reports current alcohol use. He reports that he does not use drugs. Family History: family history is not on file. The patients home medications have been reviewed.     Allergies: Pcn [penicillins]    -------------------------------------------------- RESULTS -------------------------------------------------    LABS:  Results for orders placed or performed during the hospital encounter of 02/66/91   Basic metabolic panel   Result Value Ref Range    Sodium 102 (LL) 132 - 146 mmol/L Potassium 3.7 3.5 - 5.0 mmol/L    Chloride 63 (LL) 98 - 107 mmol/L    CO2 19 (L) 22 - 29 mmol/L    Anion Gap 20 (H) 7 - 16 mmol/L    Glucose 144 (H) 74 - 99 mg/dL    BUN 9 8 - 23 mg/dL    CREATININE 0.6 (L) 0.7 - 1.2 mg/dL    GFR Non-African American >60 >=60 mL/min/1.73    GFR African American >60     Calcium 8.8 8.6 - 10.2 mg/dL   CBC   Result Value Ref Range    WBC 11.7 (H) 4.5 - 11.5 E9/L    RBC 3.72 (L) 3.80 - 5.80 E12/L    Hemoglobin 10.6 (L) 12.5 - 16.5 g/dL    Hematocrit 29.1 (L) 37.0 - 54.0 %    MCV 78.2 (L) 80.0 - 99.9 fL    MCH 28.5 26.0 - 35.0 pg    MCHC 36.4 (H) 32.0 - 34.5 %    RDW 13.4 11.5 - 15.0 fL    Platelets 469 426 - 043 E9/L    MPV 9.2 7.0 - 12.0 fL   Troponin   Result Value Ref Range    Troponin 0.07 (H) 0.00 - 0.03 ng/mL   Brain Natriuretic Peptide   Result Value Ref Range    Pro- (H) 0 - 125 pg/mL   COVID-19   Result Value Ref Range    SARS-CoV-2, NAAT Not Detected Not Detected   Lactic Acid, Plasma   Result Value Ref Range    Lactic Acid 4.9 (HH) 0.5 - 2.2 mmol/L   EKG 12 Lead   Result Value Ref Range    Ventricular Rate 92 BPM    Atrial Rate 92 BPM    P-R Interval 176 ms    QRS Duration 80 ms    Q-T Interval 388 ms    QTc Calculation (Bazett) 479 ms    P Axis 19 degrees    R Axis 19 degrees    T Axis 17 degrees       RADIOLOGY:  XR SHOULDER LEFT (MIN 2 VIEWS)   Final Result   No acute fracture or dislocation in the left shoulder. XR CHEST (2 VW)   Final Result   No acute cardiopulmonary process. EKG: This EKG is signed and interpreted by me. Rate: 92  Rhythm: Sinus  Interpretation: non-specific EKG  Comparison: no previous EKG available      ------------------------- NURSING NOTES AND VITALS REVIEWED ---------------------------  Date / Time Roomed:  12/26/2020 12:35 AM  ED Bed Assignment:  13/13    The nursing notes within the ED encounter and vital signs as below have been reviewed.      Patient Vitals for the past 24 hrs:   BP Temp Temp src Pulse Resp SpO2 12/26/20 0348 (!) 146/77 98 °F (36.7 °C) Oral 95 24 96 %   12/26/20 0214 (!) 169/81 98 °F (36.7 °C) Oral 95 25 98 %   12/26/20 0039 (!) 166/74 98 °F (36.7 °C) Oral 89 22 97 %       Oxygen Saturation Interpretation: Normal    ------------------------------------------ PROGRESS NOTES ------------------------------------------  Re-evaluation(s):  Time: 0400. Patients symptoms show no change  Repeat physical examination is not changed    Counseling:  I have spoken with the patient and discussed todays results, in addition to providing specific details for the plan of care and counseling regarding the diagnosis and prognosis. Their questions are answered at this time and they are agreeable with the plan of admission.    --------------------------------- ADDITIONAL PROVIDER NOTES ---------------------------------  Consultations:  Time: 4490. Spoke with Dr. Nas Flynn.  Discussed case. They will admit the patient. This patient's ED course included: a personal history and physicial examination, re-evaluation prior to disposition, IV medications, cardiac monitoring and continuous pulse oximetry    This patient has remained hemodynamically stable during their ED course. Diagnosis:  1. Hyponatremia    2. Frequent falls        Disposition:  Patient's disposition: Admit to CCU/ICU  Patient's condition is stable.              [facility],   12/26/20 0406

## 2020-12-26 NOTE — ED NOTES
The patient says he \"coughed\" up some mucous. There is coffee ground material in the emesis bag that is heme positive. Will notify JACINTA Whitlock RN  12/26/20 3232

## 2020-12-26 NOTE — ED TRIAGE NOTES
Pt arrived via EMS from home and reports multiple falls starting last Friday. Pt fell again tonight and had one episode of emesis, which he reports he aspirated. Pt has audible gurgling, which he reports is new. Pt has a hx of a CVA in 2015 with left sided residual. Pt denies pain at this time.

## 2020-12-26 NOTE — PROGRESS NOTES
P/c to Dr. Otilia Santos - pt is desaturing while sleeping. Pt is on room air and while sleeping is 85-88%. See new order.     Landon Valdez  12/26/2020

## 2020-12-26 NOTE — CONSULTS
Patient seen and examined in the emergency room. .  Consult dictated. Patient is a 56-year-old gentleman with underlying history of hypertension and on hydrochlorothiazide. Patient also has history of alcohol use and drinks about 3-4 beers a day. Patient presents with one-week history of falling. He also has had poor oral intake but has been able to drink fluids. Patient with severe hyponatremia probably secondary to poor solute intake limit any urinary concentrating ability coupled with the use of thiazide diuretic as well as possibility of SIADH. Patient's serum sodium does not improve despite hydration with saline and that points to more of an SIADH-like picture. We will start the patient on hypertonic saline in the context of rather severe hyponatremia. We'll try to raise serum sodium levels to approximately 110 millimoles per liter slowly. Check electrolytes every 2 hours  Dr. Leola Gaxiola , Thank You for allowing me to participate in the care of this patient. Will follow the patient with you.     Elizabeth Florez MD  Nephrology    Electronically signed by Ronny Quintana MD on 12/26/2020 at 2:43 PM

## 2020-12-26 NOTE — CONSULTS
[facility] Cardiology consult  Dr. Benjy Starr      Reason for Consult: Chest pain with elevated troponin  Requesting Physician: ALBERT Jones - CNP  CHIEF COMPLAINT: I fell  History Obtained From: patient  HISTORY OF PRESENT ILLNESS:     Patient is 63years old admitted with history of CVA with residual left-sided weakness, type 2 diabetes mellitus, hyperlipidemia, has been falling at home, patient was admitted to the hospital this time after a fall last evening, patient was noted to have elevated troponin, cardiology was consulted. Patient denies any lightheadedness or dizziness, or syncope prior to falling, he said he just lost his balance, patient is complaining of chest discomfort after the fall, no radiation, denies any palpitations, no pedal edema, no PND, no orthopnea, no syncope.     Past Medical History:   Diagnosis Date    [facility]erebral artery occlusion [facility]     Diabetes mellitus ([facility]     Type 2    Hypertension        Past Surgical History:   Procedure Laterality Date    FINGER AMPUTATION      KNEE ARTHROSCOPY      TONSILLECTOMY           Current Facility-Administered Medications:     aspirin EC tablet 81 mg, 81 mg, Oral, Daily, Martinez Walters MD, 81 mg at 12/26/20 0902    atorvastatin (LIPITOR) tablet 40 mg, 40 mg, Oral, Nightly, Martinez Walters MD    clopidogrel (PLAVIX) tablet 75 mg, 75 mg, Oral, Daily, Martinez Walters MD, 75 mg at 12/26/20 0902    glimepiride (AMARYL) tablet 2 mg, 2 mg, Oral, Daily with breakfast, Martinez Walters MD, 2 mg at 12/26/20 0902    metFORMIN (GLUCOPHAGE) tablet 500 mg, 500 mg, Oral, BID WC, Martinez Walters MD, 500 mg at 12/26/20 0902    thiamine mononitrate tablet 100 mg, 100 mg, Oral, Daily, Martinez Walters MD, 100 mg at 12/26/20 0902    sodium chloride flush 0.9 % injection 10 mL, 10 mL, Intravenous, 2 times per day, Martinez Walters MD    sodium chloride flush 0.9 % injection 10 mL, 10 mL, Intravenous, PRN, Mildred Huddleston MD   enoxaparin (LOVENOX) injection 40 mg, 40 mg, Subcutaneous, Daily, Martinez Walters MD, 40 mg at 12/26/20 0903    promethazine (PHENERGAN) tablet 12.5 mg, 12.5 mg, Oral, Q6H PRN **OR** ondansetron (ZOFRAN) injection 4 mg, 4 mg, Intravenous, Q6H PRN, Martinez Walters MD, 4 mg at 12/26/20 0606    polyethylene glycol (GLYCOLAX) packet 17 g, 17 g, Oral, Daily PRN, Martinez aWlters MD    acetaminophen (TYLENOL) tablet 650 mg, 650 mg, Oral, Q6H PRN **OR** acetaminophen (TYLENOL) suppository 650 mg, 650 mg, Rectal, Q6H PRN, Martinez Walters MD    0.9 % sodium chloride infusion, , Intravenous, Continuous, Martinez Walters MD, Stopped at 12/26/20 1511    glucose (GLUTOSE) 40 % oral gel 15 g, 15 g, Oral, PRN, Martinez Walters MD    dextrose 50 % IV solution, 12.5 g, Intravenous, PRN, Martinez Walters MD    glucagon (rDNA) injection 1 mg, 1 mg, Intramuscular, PRN, Martinez Walters MD    dextrose 5 % solution, 100 mL/hr, Intravenous, PRN, Martinez Walters MD    [START ON 12/27/2020] pantoprazole (PROTONIX) tablet 40 mg, 40 mg, Oral, QAM AC, Peggi Jose, APRN - CNP    sodium chloride 3 % solution, 35 mL/hr, Intravenous, Continuous, Kristofer Batsheva Israel MD, Last Rate: 35 mL/hr at 12/26/20 1439, 35 mL/hr at 12/26/20 1439    Current Outpatient Medications:     aspirin 81 MG EC tablet, Take 81 mg by mouth daily, Disp: , Rfl:     hydroCHLOROthiazide (HYDRODIURIL) 25 MG tablet, Take 25 mg by mouth daily, Disp: , Rfl:     acetaminophen 650 MG TABS, Take 650 mg by mouth every 4 hours as needed, Disp: 120 tablet, Rfl: 3    glimepiride (AMARYL) 2 MG tablet, Take 1 tablet by mouth daily (with breakfast), Disp: 30 tablet, Rfl: 3    insulin lispro (HUMALOG) 100 UNIT/ML injection vial, Inject 0-12 Units into the skin 3 times daily (with meals) (Patient not taking: Reported on 6/18/2020), Disp: 1 vial, Rfl: 3   insulin lispro (HUMALOG) 100 UNIT/ML injection vial, Inject 0-6 Units into the skin nightly (Patient not taking: Reported on 6/18/2020), Disp: 1 vial, Rfl: 3    metFORMIN (GLUCOPHAGE) 500 MG tablet, Take 1 tablet by mouth 2 times daily (with meals), Disp: 60 tablet, Rfl: 3    atorvastatin (LIPITOR) 40 MG tablet, Take 1 tablet by mouth nightly, Disp: 30 tablet, Rfl: 3    clopidogrel (PLAVIX) 75 MG tablet, Take 1 tablet by mouth daily (Patient not taking: Reported on 6/18/2020), Disp: 30 tablet, Rfl: 3    vitamin B-1 100 MG tablet, Take 1 tablet by mouth daily (Patient not taking: Reported on 6/18/2020), Disp: 30 tablet, Rfl: 3    Allergies as of 12/26/2020 - Review Complete 12/26/2020   Allergen Reaction Noted    Pcn [penicillins]  11/11/2015       Social History     Socioeconomic History    Marital status: Single     Spouse name: Not on file    Number of children: Not on file    Years of education: Not on file    Highest education level: Not on file   Occupational History    Not on file   Social Needs    Financial resource strain: Not on file    Food insecurity     Worry: Not on file     Inability: Not on file    Transportation needs     Medical: Not on file     Non-medical: Not on file   Tobacco Use    Smoking status: Former Smoker    Smokeless tobacco: Never Used   Substance and Sexual Activity    Alcohol use: Yes     Frequency: 4 or more times a week     Drinks per session: 5 or 6     Binge frequency: Daily or almost daily     Comment: every day drinker for most of adult life    Drug use: No    Sexual activity: Not on file   Lifestyle    Physical activity     Days per week: Not on file     Minutes per session: Not on file    Stress: Not on file   Relationships    Social connections     Talks on phone: Not on file     Gets together: Not on file     Attends Yazdanism service: Not on file     Active member of club or organization: Not on file Attends meetings of clubs or organizations: Not on file     Relationship status: Not on file    Intimate partner violence     Fear of current or ex partner: Not on file     Emotionally abused: Not on file     Physically abused: Not on file     Forced sexual activity: Not on file   Other Topics Concern    Not on file   Social History Narrative    Not on file     Family medical history: No family history for early CAD    REVIEW OF SYSTEMS:   CONSTITUTIONAL:  negative for  fevers, chills, sweats, + fatigue  EYES:  negative for  double vision, blurred vision and blind spots  HEENT:  negative for  tinnitus, earaches, nasal congestion and epistaxis  RESPIRATORY:  negative for  dry cough, cough with sputum, wheezing and hemoptysis  CARDIOVASCULAR: as per HPI  GASTROINTESTINAL:  negative for nausea, vomiting, diarrhea, constipation, pruritus and jaundice  GENITOURINARY:  negative for frequency, dysuria, nocturia, urinary incontinence and hesitancy  HEMATOLOGIC/LYMPHATIC:  negative for easy bruising, bleeding, lymphadenopathy and petechiae  ALLERGIC/IMMUNOLOGIC:  negative for urticaria, hay fever and angioedema  ENDOCRINE:  negative for heat intolerance, cold intolerance, tremor, hair loss and diabetic symptoms including neither polyuria nor polydipsia nor blurred vision  MUSCULOSKELETAL:  negative for  myalgias, arthralgias, joint swelling, stiff joints and decreased range of motion  NEUROLOGICAL:  negative for memory problems, speech problems, visual disturbance, dysphagia, weakness and numbness      PHYSICAL EXAM:   CONSTITUTIONAL:  awake, alert, cooperative, no apparent distress, and appears stated age  EYES:  lids and lashes normal and pupils equal, round and reactive to light, anicteric sclerae  HEAD:  normocepalic, without obvious abnormality, atraumatic, pink, moist mucous membranes.   NECK:  Supple, symmetrical, trachea midline, no adenopathy, thyroid symmetric, not enlarged and no tenderness, skin normal HEMATOLOGIC/LYMPHATICS:  no cervical lymphadenopathy and no supraclavicular lymphadenopathy  LUNGS:  No increased work of breathing,  No accessory muscle use or intercostal retractions, good air exchange, clear to auscultation bilaterally, no crackles or wheezing  CARDIOVASCULAR:  Normal apical impulse, regular rate and rhythm, normal S1 and S2, no S3 or S4, and no murmur noted and no JVD, no carotid bruit, no pedal edema, good carotid upstroke bilaterally. ABDOMEN:  Soft, nontender, no masses, no hepatomegaly or splenomegaly, BS+  CHEST: nontender to palpation, expands symmetrically  MUSCULOSKELETAL:  No clubbing no cyanosis. there is no redness, warmth, or swelling of the joints, bruises on both arms, ecchymosis on the left shoulder  NEUROLOGIC:  Alert, awake,oriented x3  SKIN:  no bruising or bleeding, normal skin color, texture, turgor and no redness, warmth, or swelling        BP (!) 150/69   Pulse 96   Temp 98 °F (36.7 °C) (Oral)   Resp 24   Ht 5' 10\" (1.778 m)   Wt 202 lb 5 oz (91.8 kg)   SpO2 96%   BMI 29.03 kg/m²     DATA:   I personally reviewed the admission EKG with the following interpretation: Sinus rhythm with a nonspecific T wave changes    ECHO: 11/12/2015,Summary   No previous echo for comparison. Technically adequate study. Mild concentric left ventricular hypertrophy   Ejection fraction is visually estimated at 55%. No regional wall motion abnormalities seen. E/A flow reversal noted. Suggestive of diastolic dysfunction. Mild calcification of the posterior leaflet of the mitral valve. Physiologic and/or trace mitral regurgitation is present. Physiologic and/or trace tricuspid regurgitation.        Stress Test: Not performed to date  Angiography: Not performed to date  Cardiology Labs:   BMP:    Lab Results   Component Value Date     12/26/2020    K 3.7 12/26/2020    K 3.9 12/26/2020    CL 68 12/26/2020    CL 67 12/26/2020    CO2 22 12/26/2020    CO2 22 12/26/2020 BUN 10 12/26/2020    BUN 10 12/26/2020     CMP:    Lab Results   Component Value Date     12/26/2020    K 3.7 12/26/2020    K 3.9 12/26/2020    CL 68 12/26/2020    CL 67 12/26/2020    CO2 22 12/26/2020    CO2 22 12/26/2020    BUN 10 12/26/2020    BUN 10 12/26/2020    PROT 7.4 03/01/2016     CBC:    Lab Results   Component Value Date    WBC 11.7 12/26/2020    RBC 3.72 12/26/2020    HGB 10.6 12/26/2020    HCT 29.1 12/26/2020    MCV 78.2 12/26/2020    RDW 13.4 12/26/2020     12/26/2020     PT/INR:  No results found for: PTINR  PT/INR Warfarin:  No components found for: PTPATWAR, PTINRWAR  PTT:    Lab Results   Component Value Date    APTT 34.1 11/15/2015     PTT Heparin:  No components found for: APTTHEP  Magnesium:    Lab Results   Component Value Date    MG 2.1 11/12/2015     TSH:    Lab Results   Component Value Date    TSH 0.429 12/26/2020     TROPONIN:  No components found for: TROP  BNP:  No results found for: BNP  FASTING LIPID PANEL:    Lab Results   Component Value Date    CHOL 164 03/01/2016    HDL 43 03/01/2016    TRIG 170 03/01/2016     XR SHOULDER LEFT (MIN 2 VIEWS)   Final Result   No acute fracture or dislocation in the left shoulder. XR CHEST (2 VW)   Final Result   No acute cardiopulmonary process. I have personally reviewed the laboratory, cardiac diagnostic and radiographic testing as outlined above:    IMPRESSION:  1. Elevated troponin: Doubt cardiac, suspect secondary to comorbid conditions specially muscle injury due to fall, chest pain is musculoskeletal, doubt any acute cardiac events, however, given his risk factors for his CAD, and the fact that he has not had ischemic evaluation before, might benefit from ischemic evaluation prior to discharge when other comorbid conditions resolved  2. Type 2 diabetes mellitus  3. History of CVA with residual left-sided weakness  4. Hyponatremia  5.  Fall    RECOMMENDATIONS:   1. will continue current treatment 2. Serial cardiac enzymes  3. Echocardiogram to evaluate LV function and rule out wall motion abnormality  4. [employer] a stress test prior to discharge  5. Further cardiac recommendations will be forthcoming pending his clinical course and diagnostic test findings    I have reviewed my findings and recommendations with patient    Thank you for the consult  Electronically signed by Brandy Magallon MD on 12/26/2020 at 3:33 PM  NOTE: This report was transcribed using voice recognition software.  Every effort was made to ensure accuracy; however, inadvertent computerized transcription errors may be present

## 2020-12-26 NOTE — PROGRESS NOTES
Patient in the ED as he is an ED hold. He currently has been experiencing some mid sternal chest pain since I walked in at 0745 this am. Patient original troponin was slightly elevated and therefore cardiac enzymes were ordered. Patient also was given some Mylanta to see if he was experiencing heart burn. Patient repeat troponin is . 10 and cardiology was consulted. Patient reassessed he was sleeping comfortably with no acute distress noted. Patient is coughing up thick greenish like mucous. Patient also has various degrees of bruising noted on arms, left shoulder and flank area secondary to recent fall. Patient rated his pain 7/10 non-radiating. Patient repeat sodium is pending.

## 2020-12-26 NOTE — PROGRESS NOTES
I got a call from ED nurse that patient had a large coffee ground emesis. Patient has serial H&H ordered and a consult to GI was placed. Cardiology was made aware of cardiac enzymes per ED nurse.

## 2020-12-26 NOTE — PROGRESS NOTES
P/c to Dr. Chandan Carrillo concerning consultation for Dr. Gelacio Stratton. Dr. Chandan Carrillo called Dr. Gelacio Stratton - pt accepted to ICU.     Josephsrikanth Galindo  12/26/2020

## 2020-12-26 NOTE — CONSULTS
[address]                                  CONSULTATION    PATIENT NAME: Evelia Del Valle                   :        1960  MED REC NO:   [id number]                            ROOM:       [facility]  ACCOUNT NO:   [de-identified]                           ADMIT DATE: 2020  PROVIDER:     Susu Pulido MD    CONSULT DATE:  2020    ADMITTING PHYSICIAN:  Dr. Blue Hudspeth:  Hyponatremia. HISTORY OF PRESENT ILLNESS:  The patient is being seen in consultation  at the request of Dr. Keren Winters. The patient is seen in the emergency  room. The patient is a 65-year-old gentleman with a history of type 2  diabetes mellitus, hypertension, and a cerebrovascular accident. The  patient presented to the emergency room at [place] [address] early this morning with chief complaints of having multiple falls  over the last whole week. The patient has had some bruises on his face. The patient denies any loss of consciousness. The patient states that  he has had emesis and apparently \"choked\" on his emesis. He denies any  associated diarrhea, fever, or chills. He denies any abdominal pain. He admits to drinking at least three beers a day. He denies any recent  changes in his medication regimen. He does state that he has been  drinking more water. He has had some shortness of breath. Upon  presentation, the patient was found to have a serum sodium of 102  mmol/L. The patient has been given normal saline and serum sodium is  still 103 mmol/L. Urine studies were sent after the Renal consultation  was called earlier today and urine osmolality is 565 and the urine  sodium of 30 mmol/L. When seen in the emergency room, the patient is  feeling better. He is laying down comfortably. He is in no acute  distress.     PAST MEDICAL HISTORY:  Significant for history of type 2 diabetes mellitus, history of hypertension, history of cerebrovascular accident  with cerebral infarction. He has no prior history of renal  insufficiency. PAST SURGICAL HISTORY:  Significant for arthroscopic surgery on his  knee, tonsillectomy, and a finger amputation. MEDICATIONS AT HOME:  Included aspirin 81 mg daily, hydrochlorothiazide  25 mg daily, Tylenol p.r.n., glimepiride one tablet 2 mg daily, Humalog  insulin on a sliding scale, Plavix 75 mg daily, atorvastatin 40 mg  daily, metformin 500 mg daily, and vitamin D one tablet daily. ALLERGIES:  The patient is allergic to PENICILLIN. SOCIAL HISTORY:  The patient is a former smoker and he states he  recently quit smoking. He takes alcohol in the form of three beers a  day. SOCIAL HISTORY:  The patient's father  of a myocardial infarction in  his 46s and had hypertension. The patient's mother is living and is in  her [de-identified]. REVIEW OF SYSTEMS:  Negative for any fever or chills. Denies any cough  or wheezing. No chest pain or palpitations. He has had no loss of  consciousness, but has had frequent falls. He does not have exposure to  anybody with COVID-19. He denies any abdominal pain, but he has had  nausea and vomiting. No diarrhea. No dysuria, hesitancy, urgency,  increased or decreased frequency of micturition. Rest of review of  systems is negative. PHYSICAL EXAMINATION:  GENERAL:  The patient is awake and alert, in no acute distress, oriented  to person, place, and time. VITAL SIGNS:  Blood pressure is 150/69, pulse is 96, respiratory rate is  24, and temperature 98 degrees Fahrenheit. HEENT:  Head is normocephalic. The patient has bruises on the face of  left side. Mouth and throat:  Dry oral mucosa. NECK:  Supple. No distention. No carotid bruits. CHEST:  Symmetrical.  LUNGS:  Vesicular breath sounds equal bilaterally. No rales or rhonchi. HEART:  Regular rate and rhythm without any pericardial rub or gallop. ABDOMEN:  Soft, nontender. Normal bowel sounds. No rebound tenderness. No palpable masses. EXTREMITIES:  No pedal edema. LABORATORY DATA:  Upon presentation, sodium 102 mmol/L, potassium 3.7  mmol/L, chloride 63 mmol/L, CO2 19 mmol/L. BUN 9 mg/dL, creatinine 0.6  mg/dL. Hemoglobin 10.6 gm/dL. Subsequent labs show a uric acid of 3.3  mmol/L, serum osmolality of 226 mmol/L and random urine sodium of 30  mmol/L and random urine osmolality of 565 mmol/L. IMPRESSION:  1. Severe hyponatremia. The patient with rather severe hyponatremia  and is symptomatic. In this situation, we will hydrate him with  hypertonic saline to bring the serum sodium level gradually up to around  110 mmol/L. The patient's hyponatremia seems to be chronic and the  patient is alcoholic, so we will try to raise serum sodium levels  slowly. The patient's low uric acid level along with low creatinine  coupled with high serum osmolality and urinary sodium does went to a  SIADH-like picture of undetermined etiology. The patient's diluting  capability may also be limited due to poor oral intake. Last one not  the least, the patient has been on thiazide diuretic. We will hold  thiazide diuretic. If a diuretic is indeed needed for his hypertension  control, he should be on a loop diuretic and not on a thiazide diuretic  post discharge. 2.  Benign essential hypertension. Blood pressure is on the higher  side. We will follow. We will add a calcium-channel blocker to the  patient's regimen. 3.  Metabolic acidosis. The patient did have mild metabolic acidosis  with increased anion gap upon presentation which seems to have improved. Bicarbonate level has increased from 19 to 22 with anion gap falling  from 20 to 14. There be an element of ketosis. We will check beta  hydroxybutyrate. 4.  Anemia. Follow hemoglobin and hematocrit. Check serum iron  studies. PLAN:  Plan is to hep-lock current normal saline. Start the patient on  hypertonic saline at 30 mL an hour. Check sodium   every two hours. The patient will be admitted to Intensive Care Unit. Rest of the plan  as per orders. Thank you for allowing me to participate in the care of your patient. We will follow the patient with you.         Jerzy Garcia MD    D: 12/26/2020 14:53:29       T: 12/26/2020 17:09:56     [email or web address]  Job#: [id number]     Doc#: [id number]    CC:

## 2020-12-27 ENCOUNTER — APPOINTMENT (OUTPATIENT)
Dept: GENERAL RADIOLOGY | Age: 60
DRG: 871 | End: 2020-12-27
Payer: MEDICARE

## 2020-12-27 LAB
AMYLASE: 205 U/L (ref 20–100)
ANION GAP SERPL CALCULATED.3IONS-SCNC: 15 MMOL/L (ref 7–16)
ANION GAP SERPL CALCULATED.3IONS-SCNC: 17 MMOL/L (ref 7–16)
ANION GAP SERPL CALCULATED.3IONS-SCNC: 17 MMOL/L (ref 7–16)
B.E.: -5 MMOL/L (ref -3–3)
BASOPHILS ABSOLUTE: 0.02 E9/L (ref 0–0.2)
BASOPHILS RELATIVE PERCENT: 0.1 % (ref 0–2)
BUN BLDV-MCNC: 33 MG/DL (ref 8–23)
BUN BLDV-MCNC: 39 MG/DL (ref 8–23)
BUN BLDV-MCNC: 44 MG/DL (ref 8–23)
CALCIUM SERPL-MCNC: 7.3 MG/DL (ref 8.6–10.2)
CALCIUM SERPL-MCNC: 7.9 MG/DL (ref 8.6–10.2)
CALCIUM SERPL-MCNC: 8.1 MG/DL (ref 8.6–10.2)
CHLORIDE BLD-SCNC: 77 MMOL/L (ref 98–107)
CHLORIDE BLD-SCNC: 77 MMOL/L (ref 98–107)
CHLORIDE BLD-SCNC: 78 MMOL/L (ref 98–107)
CO2: 18 MMOL/L (ref 22–29)
CO2: 19 MMOL/L (ref 22–29)
CO2: 20 MMOL/L (ref 22–29)
COHB: 0.3 % (ref 0–1.5)
CREAT SERPL-MCNC: 2.1 MG/DL (ref 0.7–1.2)
CREAT SERPL-MCNC: 2.6 MG/DL (ref 0.7–1.2)
CREAT SERPL-MCNC: 2.8 MG/DL (ref 0.7–1.2)
CREATININE URINE: 135 MG/DL (ref 40–278)
CRITICAL: ABNORMAL
DATE ANALYZED: ABNORMAL
DATE OF COLLECTION: ABNORMAL
EOSINOPHIL, URINE: 0 % (ref 0–1)
EOSINOPHILS ABSOLUTE: 0.09 E9/L (ref 0.05–0.5)
EOSINOPHILS RELATIVE PERCENT: 0.6 % (ref 0–6)
GFR AFRICAN AMERICAN: 28
GFR AFRICAN AMERICAN: 30
GFR AFRICAN AMERICAN: 39
GFR NON-AFRICAN AMERICAN: 23 ML/MIN/1.73
GFR NON-AFRICAN AMERICAN: 25 ML/MIN/1.73
GFR NON-AFRICAN AMERICAN: 32 ML/MIN/1.73
GLUCOSE BLD-MCNC: 139 MG/DL (ref 74–99)
GLUCOSE BLD-MCNC: 167 MG/DL (ref 74–99)
GLUCOSE BLD-MCNC: 213 MG/DL (ref 74–99)
HCO3: 19.4 MMOL/L (ref 22–26)
HCT VFR BLD CALC: 21.1 % (ref 37–54)
HCT VFR BLD CALC: 24.7 % (ref 37–54)
HEMOGLOBIN: 7.6 G/DL (ref 12.5–16.5)
HEMOGLOBIN: 8.9 G/DL (ref 12.5–16.5)
HHB: 14.5 % (ref 0–5)
IMMATURE GRANULOCYTES #: 0.09 E9/L
IMMATURE GRANULOCYTES %: 0.6 % (ref 0–5)
LAB: ABNORMAL
LACTIC ACID: 6.3 MMOL/L (ref 0.5–2.2)
LIPASE: 40 U/L (ref 13–60)
LYMPHOCYTES ABSOLUTE: 1.67 E9/L (ref 1.5–4)
LYMPHOCYTES RELATIVE PERCENT: 10.8 % (ref 20–42)
Lab: ABNORMAL
MCH RBC QN AUTO: 28.6 PG (ref 26–35)
MCHC RBC AUTO-ENTMCNC: 36 % (ref 32–34.5)
MCV RBC AUTO: 79.4 FL (ref 80–99.9)
METER GLUCOSE: 144 MG/DL (ref 74–99)
METER GLUCOSE: 168 MG/DL (ref 74–99)
METER GLUCOSE: 228 MG/DL (ref 74–99)
METHB: 0.4 % (ref 0–1.5)
MODE: ABNORMAL
MONOCYTES ABSOLUTE: 0.78 E9/L (ref 0.1–0.95)
MONOCYTES RELATIVE PERCENT: 5 % (ref 2–12)
NEUTROPHILS ABSOLUTE: 12.85 E9/L (ref 1.8–7.3)
NEUTROPHILS RELATIVE PERCENT: 82.9 % (ref 43–80)
O2 CONTENT: 12 ML/DL
O2 SATURATION: 85.4 % (ref 92–98.5)
O2HB: 84.8 % (ref 94–97)
OPERATOR ID: 274
OSMOLALITY URINE: 286 MOSM/KG (ref 300–900)
PATIENT TEMP: 37
PCO2: 33.3 MMHG (ref 35–45)
PDW BLD-RTO: 13.9 FL (ref 11.5–15)
PH BLOOD GAS: 7.38 (ref 7.35–7.45)
PLATELET # BLD: 222 E9/L (ref 130–450)
PMV BLD AUTO: 10 FL (ref 7–12)
PO2: 54.8 MMHG (ref 75–100)
POTASSIUM REFLEX MAGNESIUM: 4.3 MMOL/L (ref 3.5–5)
POTASSIUM REFLEX MAGNESIUM: 4.6 MMOL/L (ref 3.5–5)
POTASSIUM REFLEX MAGNESIUM: 4.7 MMOL/L (ref 3.5–5)
RBC # BLD: 3.11 E12/L (ref 3.8–5.8)
SODIUM BLD-SCNC: 107 MMOL/L (ref 132–146)
SODIUM BLD-SCNC: 111 MMOL/L (ref 132–146)
SODIUM BLD-SCNC: 113 MMOL/L (ref 132–146)
SODIUM BLD-SCNC: 114 MMOL/L (ref 132–146)
SODIUM URINE: 50 MMOL/L
SODIUM URINE: <20 MMOL/L
SOURCE, BLOOD GAS: ABNORMAL
THB: 10 G/DL (ref 11.5–16.5)
TIME ANALYZED: 745
WBC # BLD: 15.5 E9/L (ref 4.5–11.5)

## 2020-12-27 PROCEDURE — 6360000002 HC RX W HCPCS: Performed by: INTERNAL MEDICINE

## 2020-12-27 PROCEDURE — 85018 HEMOGLOBIN: CPT

## 2020-12-27 PROCEDURE — 83605 ASSAY OF LACTIC ACID: CPT

## 2020-12-27 PROCEDURE — 71045 X-RAY EXAM CHEST 1 VIEW: CPT

## 2020-12-27 PROCEDURE — C9113 INJ PANTOPRAZOLE SODIUM, VIA: HCPCS | Performed by: INTERNAL MEDICINE

## 2020-12-27 PROCEDURE — 85014 HEMATOCRIT: CPT

## 2020-12-27 PROCEDURE — 99232 SBSQ HOSP IP/OBS MODERATE 35: CPT | Performed by: INTERNAL MEDICINE

## 2020-12-27 PROCEDURE — 6370000000 HC RX 637 (ALT 250 FOR IP): Performed by: INTERNAL MEDICINE

## 2020-12-27 PROCEDURE — 6370000000 HC RX 637 (ALT 250 FOR IP): Performed by: NURSE PRACTITIONER

## 2020-12-27 PROCEDURE — 85025 COMPLETE CBC W/AUTO DIFF WBC: CPT

## 2020-12-27 PROCEDURE — 2000000000 HC ICU R&B

## 2020-12-27 PROCEDURE — 83935 ASSAY OF URINE OSMOLALITY: CPT

## 2020-12-27 PROCEDURE — 84300 ASSAY OF URINE SODIUM: CPT

## 2020-12-27 PROCEDURE — 83690 ASSAY OF LIPASE: CPT

## 2020-12-27 PROCEDURE — 82570 ASSAY OF URINE CREATININE: CPT

## 2020-12-27 PROCEDURE — 2580000003 HC RX 258: Performed by: INTERNAL MEDICINE

## 2020-12-27 PROCEDURE — 2500000003 HC RX 250 WO HCPCS

## 2020-12-27 PROCEDURE — 84295 ASSAY OF SERUM SODIUM: CPT

## 2020-12-27 PROCEDURE — P9047 ALBUMIN (HUMAN), 25%, 50ML: HCPCS | Performed by: INTERNAL MEDICINE

## 2020-12-27 PROCEDURE — 82962 GLUCOSE BLOOD TEST: CPT

## 2020-12-27 PROCEDURE — 36415 COLL VENOUS BLD VENIPUNCTURE: CPT

## 2020-12-27 PROCEDURE — 82150 ASSAY OF AMYLASE: CPT

## 2020-12-27 PROCEDURE — 87205 SMEAR GRAM STAIN: CPT

## 2020-12-27 PROCEDURE — 94660 CPAP INITIATION&MGMT: CPT

## 2020-12-27 PROCEDURE — 94640 AIRWAY INHALATION TREATMENT: CPT

## 2020-12-27 PROCEDURE — 82805 BLOOD GASES W/O2 SATURATION: CPT

## 2020-12-27 PROCEDURE — 2700000000 HC OXYGEN THERAPY PER DAY

## 2020-12-27 PROCEDURE — 2500000003 HC RX 250 WO HCPCS: Performed by: INTERNAL MEDICINE

## 2020-12-27 PROCEDURE — 80048 BASIC METABOLIC PNL TOTAL CA: CPT

## 2020-12-27 RX ORDER — 0.9 % SODIUM CHLORIDE 0.9 %
500 INTRAVENOUS SOLUTION INTRAVENOUS ONCE
Status: COMPLETED | OUTPATIENT
Start: 2020-12-27 | End: 2020-12-27

## 2020-12-27 RX ORDER — ALBUMIN (HUMAN) 12.5 G/50ML
25 SOLUTION INTRAVENOUS EVERY 4 HOURS
Status: COMPLETED | OUTPATIENT
Start: 2020-12-27 | End: 2020-12-28

## 2020-12-27 RX ORDER — NICOTINE POLACRILEX 4 MG
15 LOZENGE BUCCAL PRN
Status: DISCONTINUED | OUTPATIENT
Start: 2020-12-27 | End: 2021-01-05 | Stop reason: HOSPADM

## 2020-12-27 RX ORDER — LORAZEPAM 2 MG/ML
1 INJECTION INTRAMUSCULAR ONCE
Status: COMPLETED | OUTPATIENT
Start: 2020-12-27 | End: 2020-12-27

## 2020-12-27 RX ORDER — LIDOCAINE HYDROCHLORIDE 10 MG/ML
INJECTION, SOLUTION INFILTRATION; PERINEURAL
Status: COMPLETED
Start: 2020-12-27 | End: 2020-12-27

## 2020-12-27 RX ORDER — DESMOPRESSIN ACETATE 4 UG/ML
2 INJECTION, SOLUTION INTRAVENOUS; SUBCUTANEOUS ONCE
Status: COMPLETED | OUTPATIENT
Start: 2020-12-27 | End: 2020-12-27

## 2020-12-27 RX ORDER — IPRATROPIUM BROMIDE AND ALBUTEROL SULFATE 2.5; .5 MG/3ML; MG/3ML
1 SOLUTION RESPIRATORY (INHALATION) EVERY 4 HOURS
Status: DISCONTINUED | OUTPATIENT
Start: 2020-12-27 | End: 2021-01-05 | Stop reason: HOSPADM

## 2020-12-27 RX ORDER — DOPAMINE HYDROCHLORIDE 320 MG/100ML
3 INJECTION, SOLUTION INTRAVENOUS CONTINUOUS
Status: DISCONTINUED | OUTPATIENT
Start: 2020-12-27 | End: 2020-12-31

## 2020-12-27 RX ORDER — IPRATROPIUM BROMIDE AND ALBUTEROL SULFATE 2.5; .5 MG/3ML; MG/3ML
1 SOLUTION RESPIRATORY (INHALATION)
Status: DISCONTINUED | OUTPATIENT
Start: 2020-12-27 | End: 2020-12-27

## 2020-12-27 RX ORDER — DEXTROSE MONOHYDRATE 50 MG/ML
INJECTION, SOLUTION INTRAVENOUS CONTINUOUS
Status: DISCONTINUED | OUTPATIENT
Start: 2020-12-27 | End: 2020-12-27

## 2020-12-27 RX ORDER — LEVOFLOXACIN 5 MG/ML
500 INJECTION, SOLUTION INTRAVENOUS
Status: DISCONTINUED | OUTPATIENT
Start: 2020-12-27 | End: 2020-12-30

## 2020-12-27 RX ORDER — DEXTROSE MONOHYDRATE 25 G/50ML
12.5 INJECTION, SOLUTION INTRAVENOUS PRN
Status: DISCONTINUED | OUTPATIENT
Start: 2020-12-27 | End: 2021-01-05 | Stop reason: HOSPADM

## 2020-12-27 RX ORDER — LIDOCAINE HYDROCHLORIDE 10 MG/ML
5 INJECTION, SOLUTION INFILTRATION; PERINEURAL ONCE
Status: COMPLETED | OUTPATIENT
Start: 2020-12-27 | End: 2020-12-27

## 2020-12-27 RX ORDER — PANTOPRAZOLE SODIUM 40 MG/10ML
40 INJECTION, POWDER, LYOPHILIZED, FOR SOLUTION INTRAVENOUS 2 TIMES DAILY
Status: DISCONTINUED | OUTPATIENT
Start: 2020-12-27 | End: 2021-01-04

## 2020-12-27 RX ORDER — DEXTROSE MONOHYDRATE 50 MG/ML
100 INJECTION, SOLUTION INTRAVENOUS PRN
Status: DISCONTINUED | OUTPATIENT
Start: 2020-12-27 | End: 2021-01-05 | Stop reason: HOSPADM

## 2020-12-27 RX ORDER — DEXMEDETOMIDINE HYDROCHLORIDE 4 UG/ML
0.2 INJECTION, SOLUTION INTRAVENOUS CONTINUOUS
Status: DISCONTINUED | OUTPATIENT
Start: 2020-12-27 | End: 2020-12-30

## 2020-12-27 RX ADMIN — PANTOPRAZOLE SODIUM 40 MG: 40 TABLET, DELAYED RELEASE ORAL at 06:24

## 2020-12-27 RX ADMIN — DESMOPRESSIN ACETATE 2 MCG: 4 SOLUTION INTRAVENOUS at 07:17

## 2020-12-27 RX ADMIN — SODIUM BICARBONATE: 84 INJECTION, SOLUTION INTRAVENOUS at 10:59

## 2020-12-27 RX ADMIN — ALBUMIN (HUMAN) 25 G: 0.25 INJECTION, SOLUTION INTRAVENOUS at 23:55

## 2020-12-27 RX ADMIN — Medication 0.2 MCG/KG/HR: at 10:53

## 2020-12-27 RX ADMIN — HYDROCORTISONE SODIUM SUCCINATE 100 MG: 100 INJECTION, POWDER, FOR SOLUTION INTRAMUSCULAR; INTRAVENOUS at 18:48

## 2020-12-27 RX ADMIN — SODIUM CHLORIDE 500 ML: 9 INJECTION, SOLUTION INTRAVENOUS at 11:48

## 2020-12-27 RX ADMIN — IPRATROPIUM BROMIDE AND ALBUTEROL SULFATE 1 AMPULE: .5; 3 SOLUTION RESPIRATORY (INHALATION) at 22:25

## 2020-12-27 RX ADMIN — IPRATROPIUM BROMIDE AND ALBUTEROL SULFATE 1 AMPULE: .5; 3 SOLUTION RESPIRATORY (INHALATION) at 09:43

## 2020-12-27 RX ADMIN — INSULIN LISPRO 4 UNITS: 100 INJECTION, SOLUTION INTRAVENOUS; SUBCUTANEOUS at 18:48

## 2020-12-27 RX ADMIN — IPRATROPIUM BROMIDE AND ALBUTEROL SULFATE 1 AMPULE: .5; 3 SOLUTION RESPIRATORY (INHALATION) at 13:39

## 2020-12-27 RX ADMIN — VANCOMYCIN HYDROCHLORIDE 1750 MG: 10 INJECTION, POWDER, LYOPHILIZED, FOR SOLUTION INTRAVENOUS at 12:08

## 2020-12-27 RX ADMIN — LORAZEPAM 1 MG: 2 INJECTION INTRAMUSCULAR; INTRAVENOUS at 14:54

## 2020-12-27 RX ADMIN — INSULIN LISPRO 2 UNITS: 100 INJECTION, SOLUTION INTRAVENOUS; SUBCUTANEOUS at 11:59

## 2020-12-27 RX ADMIN — IPRATROPIUM BROMIDE AND ALBUTEROL SULFATE 1 AMPULE: .5; 3 SOLUTION RESPIRATORY (INHALATION) at 17:22

## 2020-12-27 RX ADMIN — PANTOPRAZOLE SODIUM 40 MG: 40 INJECTION, POWDER, LYOPHILIZED, FOR SOLUTION INTRAVENOUS at 23:37

## 2020-12-27 RX ADMIN — HYDROCORTISONE SODIUM SUCCINATE 100 MG: 100 INJECTION, POWDER, FOR SOLUTION INTRAMUSCULAR; INTRAVENOUS at 14:59

## 2020-12-27 RX ADMIN — METRONIDAZOLE 500 MG: 500 INJECTION, SOLUTION INTRAVENOUS at 18:47

## 2020-12-27 RX ADMIN — Medication 0.4 MCG/KG/HR: at 23:02

## 2020-12-27 RX ADMIN — Medication 10 ML: at 08:53

## 2020-12-27 RX ADMIN — LIDOCAINE HYDROCHLORIDE 5 ML: 10 INJECTION, SOLUTION INFILTRATION; PERINEURAL at 14:52

## 2020-12-27 RX ADMIN — LEVOFLOXACIN 500 MG: 5 INJECTION, SOLUTION INTRAVENOUS at 11:02

## 2020-12-27 RX ADMIN — SODIUM CHLORIDE 35 ML/HR: 3 INJECTION, SOLUTION INTRAVENOUS at 06:24

## 2020-12-27 RX ADMIN — HYDROCORTISONE SODIUM SUCCINATE 100 MG: 100 INJECTION, POWDER, FOR SOLUTION INTRAMUSCULAR; INTRAVENOUS at 23:58

## 2020-12-27 RX ADMIN — METRONIDAZOLE 500 MG: 500 INJECTION, SOLUTION INTRAVENOUS at 11:09

## 2020-12-27 RX ADMIN — SODIUM CHLORIDE 500 ML: 9 INJECTION, SOLUTION INTRAVENOUS at 08:18

## 2020-12-27 RX ADMIN — INSULIN LISPRO 4 UNITS: 100 INJECTION, SOLUTION INTRAVENOUS; SUBCUTANEOUS at 23:59

## 2020-12-27 RX ADMIN — ALBUMIN (HUMAN) 25 G: 0.25 INJECTION, SOLUTION INTRAVENOUS at 20:24

## 2020-12-27 RX ADMIN — DEXTROSE MONOHYDRATE: 50 INJECTION, SOLUTION INTRAVENOUS at 06:55

## 2020-12-27 RX ADMIN — ENOXAPARIN SODIUM 40 MG: 40 INJECTION SUBCUTANEOUS at 09:03

## 2020-12-27 RX ADMIN — DOPAMINE HYDROCHLORIDE 3 MCG/KG/MIN: 320 INJECTION, SOLUTION INTRAVENOUS at 13:03

## 2020-12-27 ASSESSMENT — PAIN SCALES - GENERAL
PAINLEVEL_OUTOF10: 0
PAINLEVEL_OUTOF10: 0

## 2020-12-27 NOTE — PROGRESS NOTES
[address] [place], [place] [address]   Progress Note    Admitting Date and Time: 12/26/2020 12:35 AM  Admit Dx: Hyponatremia [E87.1]    Subjective:    Sedated on Precedex  Per RN: Gauri Cheek getting ready for line placement    ROS: denies fever, chills, cp, sob, n/v, HA unless stated above.  ipratropium-albuterol  1 ampule Inhalation Q4H    levofloxacin  500 mg Intravenous Q48H    metroNIDAZOLE  500 mg Intravenous Q8H    pantoprazole  40 mg Intravenous BID    insulin lispro  0-12 Units Subcutaneous Q6H    hydrocortisone sodium succinate PF  100 mg Intravenous Q6H    aspirin  81 mg Oral Daily    atorvastatin  40 mg Oral Nightly    clopidogrel  75 mg Oral Daily    thiamine mononitrate  100 mg Oral Daily    sodium chloride flush  10 mL Intravenous 2 times per day    enoxaparin  40 mg Subcutaneous Daily         glucose, 15 g, PRN      dextrose, 12.5 g, PRN      glucagon (rDNA), 1 mg, PRN      dextrose, 100 mL/hr, PRN      sodium chloride flush, 10 mL, PRN      promethazine, 12.5 mg, Q6H PRN    Or      ondansetron, 4 mg, Q6H PRN      polyethylene glycol, 17 g, Daily PRN      acetaminophen, 650 mg, Q6H PRN    Or      acetaminophen, 650 mg, Q6H PRN      LORazepam, 1 mg, Q1H PRN    Or      LORazepam, 1 mg, Q1H PRN    Or      LORazepam, 2 mg, Q1H PRN    Or      LORazepam, 2 mg, Q1H PRN    Or      LORazepam, 3 mg, Q1H PRN    Or      LORazepam, 3 mg, Q1H PRN    Or      LORazepam, 4 mg, Q1H PRN    Or      LORazepam, 4 mg, Q1H PRN         Objective:    BP (!) 97/52   Pulse 96   Temp 99 °F (37.2 °C) (Core)   Resp (!) 42   Ht 5' 10\" (1.778 m)   Wt 208 lb 1.6 oz (94.4 kg)   SpO2 90%   BMI 29.86 kg/m²     No acute distress  On Bipap  no cervical lymphadenopathy  Heart has a regular rate and rhythm no murmur  Lungs equal movements, mild transmitted sounds  Abdomen is soft nontender nondistended no rebound or guarding. Anasarca, adequate peripheral perfusion. 6.  Leukocytosis: no fevers, most likely reactive follow-up CBC. 7. Coffee ground emesis, upper gi bleed. Gi started ppi bid,  8. CVA with left-sided weakness: Continue Lipitor, aspirin and Plavix  9. DVT prophylaxis: Lovenox subcu  10. Code Status: Full    NOTE: This report was transcribed using voice recognition software. Every effort was made to ensure accuracy; however, inadvertent computerized transcription errors may be present.      Electronically signed by Skylar Driscoll MD on 12/27/2020 at 2:10 PM

## 2020-12-27 NOTE — PROGRESS NOTES
0'\"   12/26/20 10:40 PM   [phone] 2 ICU From: [facility] RE: Jai Ji RM: ICU 5 Results for Mary Baugh (MRN [id number]) as of 12/26/2020 22:39 12/26/2020 Osmolality, Ur: 602 Sodium, Ur: <20 12/26/2020 20:57 Sodium: 106 (LL)  Read 10:47 PM       12/26/20 10:48 PM   Ok      12/27/20 4:17 AM   Results for Mary Baugh (MRN [id number]) 12/27/2020 00:57 Sodium: 107 (LL) 12/27/2020 03:02 Sodium: 113 (LL)  Read 4:18 AM        12/27/20 4:18 AM   3% running at 35cc/hour  Read 4:18 AM       12/27/20 4:19 AM   Ok thanks       12/27/20 4:20 AM   Send urine sodium and osmolality       12/27/20 4:20 AM   Ok I will send one now.  Thank you  Read 4:21 AM       12/27/20 6:16 AM   Results for Mary Baugh (MRN [id number]) 12/27/2020 05:24 Sodium: 114 (LL) Results for Mary Baugh (MRN [id number]) as of 12/27/2020 04:24 Osmolality, Ur: 286 (L) Sodium, Ur: 50  Read 6:45 AM       12/27/20 6:45 AM   Results for Mary Baugh (MRN [id number]) 12/27/2020 05:24 Potassium: 4.3 Chloride: 77 (L) CO2: 19 (L) BUN: 33 (H) Creatinine: 2.1 (H) Anion Gap: 17 (H) GFR Non-: 32 GFR : 39 Glucose: 139 (H) Calcium: 8.1 (L)  Read 6:45 AM

## 2020-12-27 NOTE — PROGRESS NOTES
Nephrology Progress Note    12/28/20: Patient seen and examined. No family member is present at bedside. Chart reviewed. Pt on BIPAP this AM. He is oriented to person and place this AM not month but did know Jose in Dec            Vital SignsBP (!) 97/52   Pulse 96   Temp 99 °F (37.2 °C) (Core)   Resp (!) 42   Ht 5' 10\" (1.778 m)   Wt 208 lb 1.6 oz (94.4 kg)   SpO2 90%   BMI 29.86 kg/m²   24HR INTAKE/OUTPUT:      Intake/Output Summary (Last 24 hours) at 12/27/2020 1617  Last data filed at 12/27/2020 1458  Gross per 24 hour   Intake 2961 ml   Output 1318 ml   Net 1643 ml         Physical Exam    Neck: No JVD  Lungs: Breath sounds decreased at the bases. A few expiratory rhonchi present. Heart: Regular rate and rhythm. No S3 gallop. No  murmrur. Abdomen: Soft non distended, non tender and normal bowel sounds. Extremeties: No edema.       ROS:  RESPIRATORY: Positive for shortness of breath  CARDIOVASCULAR:  negative  GASTROINTESTINAL:  negative  GENITOURINARY:  negative    Current Facility-Administered Medications   Medication Dose Route Frequency Provider Last Rate Last Admin    ipratropium-albuterol (DUONEB) nebulizer solution 1 ampule  1 ampule Inhalation Q4H Steffany Copeland MD   1 ampule at 12/27/20 1339    sodium bicarbonate 50 mEq in dextrose 5 % 1,000 mL infusion   Intravenous Continuous Steffany Copeland  mL/hr at 12/27/20 1530 Restarted at 12/27/20 1530    levoFLOXacin (LEVAQUIN) 500 MG/100ML infusion 500 mg  500 mg Intravenous Q48H Steffany Copeland MD   Stopped at 12/27/20 1202    metronidazole (FLAGYL) 500 mg in NaCl 100 mL IVPB premix  500 mg Intravenous Q8H Steffany Copeland MD   Stopped at 12/27/20 1209    glucose (GLUTOSE) 40 % oral gel 15 g  15 g Oral PRN Steffany Copeland MD        dextrose 50 % IV solution  12.5 g Intravenous PRN Steffany Copeland MD        glucagon (rDNA) injection 1 mg  1 mg Intramuscular PRN Steffany Copeland MD  dextrose 5 % solution  100 mL/hr Intravenous PRN Mason Cosby MD        dexmedetomidine [facility]) 400 mcg in sodium chloride 0.9 % 100 mL infusion  0.2 mcg/kg/hr Intravenous Continuous Mason Cosby MD 9.4 mL/hr at 12/27/20 1501 0.4 mcg/kg/hr at 12/27/20 1501    pantoprazole (PROTONIX) injection 40 mg  40 mg Intravenous BID Castillo DO Jay Jay        insulin lispro (HUMALOG) injection vial 0-12 Units  0-12 Units Subcutaneous Q6H Mason Cosby MD   2 Units at 12/27/20 1159    hydrocortisone sodium succinate PF (SOLU-CORTEF) injection 100 mg  100 mg Intravenous Q6H Mason Cosby MD   100 mg at 12/27/20 1459    DOPamine (INTROPIN) 800 mg in dextrose 5 % 250 mL infusion  3 mcg/kg/min Intravenous Continuous Mason Cosby MD 5.3 mL/hr at 12/27/20 1303 3 mcg/kg/min at 12/27/20 1303    aspirin EC tablet 81 mg  81 mg Oral Daily Martinez Walters MD   81 mg at 12/26/20 0902    atorvastatin (LIPITOR) tablet 40 mg  40 mg Oral Nightly Martinez Walters MD   40 mg at 12/26/20 2049    clopidogrel (PLAVIX) tablet 75 mg  75 mg Oral Daily Martinez Walters MD   75 mg at 12/26/20 0902    thiamine mononitrate tablet 100 mg  100 mg Oral Daily Martinez Walters MD   100 mg at 12/26/20 0902    sodium chloride flush 0.9 % injection 10 mL  10 mL Intravenous 2 times per day Aleksandra Kovacs MD   10 mL at 12/27/20 0853    sodium chloride flush 0.9 % injection 10 mL  10 mL Intravenous PRN Martinez Walters MD        enoxaparin (LOVENOX) injection 40 mg  40 mg Subcutaneous Daily Martinez Walters MD   40 mg at 12/27/20 0903    promethazine (PHENERGAN) tablet 12.5 mg  12.5 mg Oral Q6H PRN Martinez Walters MD        Or    ondansetron (ZOFRAN) injection 4 mg  4 mg Intravenous Q6H PRN Martinez Walters MD   4 mg at 12/26/20 0606    polyethylene glycol (GLYCOLAX) packet 17 g  17 g Oral Daily PRN Martinez Walters MD        acetaminophen (TYLENOL) tablet 650 mg  650 mg Oral Q6H PRN Martinez Walters MD        Or  acetaminophen (TYLENOL) suppository 650 mg  650 mg Rectal Q6H PRN Martinez Walters MD   650 mg at 12/26/20 2325    LORazepam (ATIVAN) tablet 1 mg  1 mg Oral Q1H PRN Bryan Ortiz MD        Or    LORazepam (ATIVAN) injection 1 mg  1 mg Intravenous Q1H PRN Bryan Ortiz MD        Or    LORazepam (ATIVAN) tablet 2 mg  2 mg Oral Q1H PRN Bryan Ortiz MD        Or    LORazepam (ATIVAN) injection 2 mg  2 mg Intravenous Q1H PRN Bryan Ortiz MD   2 mg at 12/26/20 2226    Or    LORazepam (ATIVAN) tablet 3 mg  3 mg Oral Q1H PRN Bryan Ortiz MD        Or    LORazepam (ATIVAN) injection 3 mg  3 mg Intravenous Q1H PRN Bryan Ortiz MD   3 mg at 12/26/20 2326    Or    LORazepam (ATIVAN) tablet 4 mg  4 mg Oral Q1H PRN Bryan Ortiz MD        Or    LORazepam (ATIVAN) injection 4 mg  4 mg Intravenous Q1H PRN Bryan Ortiz MD           XR CHEST PORTABLE   Final Result   Bilateral pulmonary infiltrates no significant interval change      XR CHEST PORTABLE   Final Result   Left basilar pulmonary opacity which may represent atelectasis, pneumonia,   effusion or a combination thereof. XR SHOULDER LEFT (MIN 2 VIEWS)   Final Result   No acute fracture or dislocation in the left shoulder. XR CHEST (2 VW)   Final Result   No acute cardiopulmonary process.       XR CHEST PORTABLE    (Results Pending)         Labs:    CBC:   Recent Labs     12/26/20  0124 12/26/20  1706 12/27/20  0524   WBC 11.7* 14.2* 15.5*   HGB 10.6* 9.4* 8.9*    160 222        No results found for: IRON, TIBC, FERRITIN    Lab Results   Component Value Date    CALCIUM 7.9 (L) 12/27/2020    CALCIUM 8.1 (L) 12/27/2020    CALCIUM 7.9 (L) 12/26/2020    PHOS 3.7 11/12/2015    PHOS 3.7 11/11/2015    MG 2.1 11/12/2015    MG 2.1 11/11/2015       BMP:   Recent Labs     12/26/20  2300 12/26/20  2300 12/27/20  0524 12/27/20  0524 12/27/20  1042 12/27/20  1257 12/27/20  1512   *   < > 113*  114*   < > 114* 113* 113* K 3.8  --  4.3  --  4.7  --   --    CL 74*  --  77*  --  77*  --   --    CO2 20*  --  19*  --  20*  --   --    BUN 23  --  33*  --  39*  --   --    CREATININE 1.3*  --  2.1*  --  2.6*  --   --    GLUCOSE 187*  --  139*  --  167*  --   --     < > = values in this interval not displayed. Assessment: / Plan:    1. Severe hyponatremia. Hyponatremia believed to be secondary to multiple factors including thiazide diuretic use, significant history of alcohol abuse in the form of beer limiting free water excretion due to low solute load as well as possibility of underlying SIADH. PLAN:1.Follow Na+  2. Follow Urine osm    2. Acute kidney injury. FeNa <1% suggesting possible component of decreased effective renal perfusion in the setting of the hypotension  Cr down to 1.1mg/dl  PLAN:1. Continue to monitor    3. Metabolic acidosis with increased anion gap. Patient with a lactic acidosis possible Type A secondary to tissue hypoperfusion as well as a possible Type B due to  use of Metformin. PLAN:1. Continue off Metformin  2. Target bicarbonate level 22 mmol/L.    3. Recheck lactic acid this PM         4. Hypotension. Improving Dopamine being weaned  PLAN:1. Defer to Pulm/CCC      5. Microcytic Anemia. Hgb down to 6.2  PLAN:1. Check  serum iron studies. 2. Agree with transfusing    6. Hypocalcemia. In the setting of hypoalbumionemia   PLAN:1. Check ionized Ca++ and vitamin D and PTH levels.           Electronically signed by Carole Gosselin, MD

## 2020-12-27 NOTE — CONSULTS
Pulmonary/Critical Care Consult Note    CHIEF COMPLAINT: Severe hyponatremia, likely related to beer [place], possible sepsis, possible pneumonia,, history of diabetes mellitus type 2, hypertension, prior stroke with left-sided weakness    HISTORY OF PRESENT ILLNESS: The patient is a 65-year-old male with a history of prior stroke with left-sided weakness and a long history of alcohol abuse chiefly beer. Patient himself only admitted to 3 beers per day but this is original sodium when he came into the emergency room was 102. At that time, the BUN/creatinine were normal but now his BUN and creatinine have risen to 33 and 2.1. There is an anion gap elevation to 17. Serum sodium has gone up to 113 with the consultation of Dr. Zhara Juarez. The patient originally received 3% saline, then normal saline then D5W. He is on the CIWA protocol. Of note is an elevated CK of over 3000 on admission, possibly etiologic to the patient's RYLAN. His chest x-ray has evolved from clear to a questionable left lower lobe pneumonia. ALLERGY:  Pcn [penicillins]    FAMILY HISTORY:  History reviewed. No pertinent family history. SOCIAL HISTORY:  Social History     Socioeconomic History    Marital status: Single     Spouse name: Not on file    Number of children: Not on file    Years of education: Not on file    Highest education level: Not on file   Occupational History    Not on file   Social Needs    Financial resource strain: Not on file    Food insecurity     Worry: Not on file     Inability: Not on file    Transportation needs     Medical: Not on file     Non-medical: Not on file   Tobacco Use    Smoking status: Former Smoker    Smokeless tobacco: Never Used   Substance and Sexual Activity    Alcohol use: Yes     Frequency: 4 or more times a week     Drinks per session: 5 or 6     Binge frequency: Daily or almost daily     Comment: every day drinker for most of adult life    Drug use:  No  Sexual activity: Not on file   Lifestyle    Physical activity     Days per week: Not on file     Minutes per session: Not on file    Stress: Not on file   Relationships    Social connections     Talks on phone: Not on file     Gets together: Not on file     Attends Buddhist service: Not on file     Active member of club or organization: Not on file     Attends meetings of clubs or organizations: Not on file     Relationship status: Not on file    Intimate partner violence     Fear of current or ex partner: Not on file     Emotionally abused: Not on file     Physically abused: Not on file     Forced sexual activity: Not on file   Other Topics Concern    Not on file   Social History Narrative    Not on file       MEDICAL HISTORY:  Past Medical History:   Diagnosis Date    Cerebral artery occlusion with [facility] infarction [facility] [address]     Diabetes mellitus [address] [address]     Type 2    Hypertension        MEDICATIONS:   ipratropium-albuterol  1 ampule Inhalation Q4H    levofloxacin  500 mg Intravenous Q48H    metroNIDAZOLE  500 mg Intravenous Q8H    aspirin  81 mg Oral Daily    atorvastatin  40 mg Oral Nightly    clopidogrel  75 mg Oral Daily    glimepiride  2 mg Oral Daily with breakfast    thiamine mononitrate  100 mg Oral Daily    sodium chloride flush  10 mL Intravenous 2 times per day    enoxaparin  40 mg Subcutaneous Daily    pantoprazole  40 mg Oral QAM AC      sodium bicarbonate infusion      dextrose       sodium chloride flush, promethazine **OR** ondansetron, polyethylene glycol, acetaminophen **OR** acetaminophen, glucose, dextrose, glucagon (rDNA), dextrose, LORazepam **OR** LORazepam **OR** LORazepam **OR** LORazepam **OR** LORazepam **OR** LORazepam **OR** LORazepam **OR** LORazepam    REVIEW OF SYSTEMS:  Patient is unable to give a history because of his confusion, delirium tremens, and sedation    PHYSICAL EXAM:  Vitals:    12/27/20 0800   BP:    Pulse:    Resp:    Temp: 97.8 °F (36.6 °C) SpO2:      FiO2 : 50 %  O2 Flow Rate (L/min): 4 L/min  O2 Device: PAP (positive airway pressure)    Constitutional: Occasional low-grade temperature highest being 99.8, most recently 97.8 without Tylenol or aspirin, no chills, no diaphoresis  Skin: No skin rash, no skin breakdown  HEENT: Snoring relieved with BiPAP  Neck: No JVD, lymphadenopathy, thyromegaly  Cardiovascular: S1, S2 normal, no S3 murmurs or rubs present  Respiratory: Inspiratory crackles and few wheezes over both posterior lung fields particularly lower lung fields.   Some upper airway noises are present anteriorly  Gastrointestinal: Soft, mildly obese, no hepatosplenomegaly at this time  Genitourinary: No grossly bloody urine  Extremities: No clubbing, cyanosis, or edema  Neurological: Confused, agitated, occasionally answers appropriately through the BiPAP mask  Psychological: Cannot assess at this time    LABS:  WBC   Date Value Ref Range Status   12/27/2020 15.5 (H) 4.5 - 11.5 E9/L Final   12/26/2020 14.2 (H) 4.5 - 11.5 E9/L Final   12/26/2020 11.7 (H) 4.5 - 11.5 E9/L Final     Hemoglobin   Date Value Ref Range Status   12/27/2020 8.9 (L) 12.5 - 16.5 g/dL Final   12/26/2020 9.4 (L) 12.5 - 16.5 g/dL Final   12/26/2020 10.6 (L) 12.5 - 16.5 g/dL Final     Hematocrit   Date Value Ref Range Status   12/27/2020 24.7 (L) 37.0 - 54.0 % Final   12/26/2020 25.5 (L) 37.0 - 54.0 % Final   12/26/2020 29.1 (L) 37.0 - 54.0 % Final     MCV   Date Value Ref Range Status   12/27/2020 79.4 (L) 80.0 - 99.9 fL Final   12/26/2020 78.2 (L) 80.0 - 99.9 fL Final   12/26/2020 78.2 (L) 80.0 - 99.9 fL Final     Platelets   Date Value Ref Range Status   12/27/2020 222 130 - 450 E9/L Final   12/26/2020 160 130 - 450 E9/L Final   12/26/2020 162 130 - 450 E9/L Final     Sodium   Date Value Ref Range Status   12/27/2020 113 (LL) 132 - 146 mmol/L Final   12/27/2020 113 (LL) 132 - 146 mmol/L Final   12/27/2020 114 (LL) 132 - 146 mmol/L Final     Potassium reflex Magnesium Date Value Ref Range Status   12/27/2020 4.3 3.5 - 5.0 mmol/L Final   12/26/2020 3.8 3.5 - 5.0 mmol/L Final   12/26/2020 3.9 3.5 - 5.0 mmol/L Final     Chloride   Date Value Ref Range Status   12/27/2020 77 (L) 98 - 107 mmol/L Final   12/26/2020 74 (LL) 98 - 107 mmol/L Final   12/26/2020 71 (LL) 98 - 107 mmol/L Final     CO2   Date Value Ref Range Status   12/27/2020 19 (L) 22 - 29 mmol/L Final   12/26/2020 20 (L) 22 - 29 mmol/L Final   12/26/2020 22 22 - 29 mmol/L Final     BUN   Date Value Ref Range Status   12/27/2020 33 (H) 8 - 23 mg/dL Final   12/26/2020 23 8 - 23 mg/dL Final   12/26/2020 13 8 - 23 mg/dL Final     CREATININE   Date Value Ref Range Status   12/27/2020 2.1 (H) 0.7 - 1.2 mg/dL Final   12/26/2020 1.3 (H) 0.7 - 1.2 mg/dL Final   12/26/2020 0.8 0.7 - 1.2 mg/dL Final     Glucose   Date Value Ref Range Status   12/27/2020 139 (H) 74 - 99 mg/dL Final   12/26/2020 187 (H) 74 - 99 mg/dL Final   12/26/2020 199 (H) 74 - 99 mg/dL Final     Calcium   Date Value Ref Range Status   12/27/2020 8.1 (L) 8.6 - 10.2 mg/dL Final   12/26/2020 7.9 (L) 8.6 - 10.2 mg/dL Final   12/26/2020 7.9 (L) 8.6 - 10.2 mg/dL Final     Total Protein   Date Value Ref Range Status   03/01/2016 7.4 6.4 - 8.3 g/dL Final   11/12/2015 7.5 6.4 - 8.3 g/dL Final   11/11/2015 8.7 (H) 6.4 - 8.3 g/dL Final     Alb   Date Value Ref Range Status   03/01/2016 4.6 3.5 - 5.2 g/dL Final   11/12/2015 4.1 3.5 - 5.2 g/dL Final   11/11/2015 4.6 3.5 - 5.2 g/dL Final     Total Bilirubin   Date Value Ref Range Status   03/01/2016 0.6 0.0 - 1.2 mg/dL Final   11/12/2015 0.9 0.0 - 1.2 mg/dL Final   11/11/2015 0.8 0.0 - 1.2 mg/dL Final     Alkaline Phosphatase   Date Value Ref Range Status   03/01/2016 95 40 - 129 U/L Final   11/12/2015 77 40 - 129 U/L Final   11/11/2015 92 40 - 129 U/L Final     AST   Date Value Ref Range Status   03/01/2016 21 0 - 39 U/L Final   11/12/2015 96 (H) 0 - 39 U/L Final   11/11/2015 154 (H) 0 - 39 U/L Final     ALT 6. Elevated CK, possibly rhabdomyolysis  7. Consider sepsis  8. Check magnesium and phosphorus  9. Check lactate level  10. CK in a.m.  11. Try to avoid intubation but may have no choice  12. Inhaled bronchodilators and steroids  13. Consider need for hydrocortisone for possible relative adrenal insufficiency  14. Will initiate BiPAP for now    PLAN:  1. BiPAP to support breathing  2. Antibiotics to include levofloxacin, Flagyl, and 1 dose of vancomycin  3. Hypotonic bicarbonate drip to help with rhabdomyolysis and slow improvement in serum sodium to avoid central pontine myelinolysis  4. Restraints  5. Precedex  6. CIWA protocol  7. BiPAP  8. Hydrocortisone as noted above  9. Heparin for prevention of DVT  10. Protonix for prevention of GI bleed  11. Amylase, lipase, lactate    ATTESTATION:  ICU Staff Physician note of personal involvement in Care  As the attending physician, I certify that I personally reviewed the patients history and personally examined the patient to confirm the physical findings described above,  And that I reviewed the relevant imaging studies and available reports. I also discussed the differential diagnosis and all of the proposed management plans with the patient and individuals accompanying the patient to this visit. They had the opportunity to ask questions about the proposed management plans and to have those questions answered. This patient has a high probability of sudden, clinically significant deterioration, which requires the highest level of physician preparedness to intervene urgently. I managed/supervised life or organ supporting interventions that required frequent physician assessment. I devoted my full attention to the direct care of this patient for the amount of time indicated below. Time I spent with the family or surrogate(s) is included only if the patient was incapable of providing the necessary information or participating in medical decisions  Time devoted to teaching and to any procedures I billed separately is not included.     CRITICAL CARE TIME:  38 minutes    Electronically signed by Katherine Abraham MD on 12/27/2020 at 10:09 AM

## 2020-12-27 NOTE — PROGRESS NOTES
Nephrology Note  Hardeep Clemons MD          Patient seen and examined. No family member is present at bedside. Chart reviewed. Patient on intensive care unit. Patient did become hypotensive and required brief. Of support with normal saline bolus. His hypertonic saline was stopped due to the rise in the serum sodium to 114 and he was given DDAVP along with D5W. Because of the hypotension D5W was stopped to give the patient normal saline bolus. Patient is now on a BiPAP mask. Urine output is lower. He is able to follow commands. As per the chart the patient's brother has indicated that the patient has a significant history of alcohol abuse. In no acute distress. Vital SignsBP (!) 97/52   Pulse 96   Temp 97.8 °F (36.6 °C) (Axillary)   Resp (!) 42   Ht 5' 10\" (1.778 m)   Wt 208 lb 1.6 oz (94.4 kg)   SpO2 90%   BMI 29.86 kg/m²   24HR INTAKE/OUTPUT:    Intake/Output Summary (Last 24 hours) at 12/27/2020 1158  Last data filed at 12/27/2020 1057  Gross per 24 hour   Intake 1640 ml   Output 1265 ml   Net 375 ml         Physical Exam    Neck: No JVD  Lungs: Breath sounds decreased at the bases. A few expiratory rhonchi present. Heart: Regular rate and rhythm. No S3 gallop. No  murmrur. Abdomen: Soft non distended, non tender and normal bowel sounds. Extremeties: No edema.       ROS:  RESPIRATORY: Positive for shortness of breath  CARDIOVASCULAR:  negative  GASTROINTESTINAL:  negative  GENITOURINARY:  negative    Current Facility-Administered Medications   Medication Dose Route Frequency Provider Last Rate Last Admin    ipratropium-albuterol (DUONEB) nebulizer solution 1 ampule  1 ampule Inhalation Q4H Froilan Bledsoe MD        sodium bicarbonate 50 mEq in dextrose 5 % 1,000 mL infusion   Intravenous Continuous Froilan Bledsoe MD   Stopped at 12/27/20 1154  levoFLOXacin (LEVAQUIN) 500 MG/100ML infusion 500 mg  500 mg Intravenous Q48H Fausto Matamoros  mL/hr at 12/27/20 1102 500 mg at 12/27/20 1102    metronidazole (FLAGYL) 500 mg in NaCl 100 mL IVPB premix  500 mg Intravenous Q8H Fausto Matamoros  mL/hr at 12/27/20 1109 500 mg at 12/27/20 1109    vancomycin (VANCOCIN) 1,750 mg in dextrose 5 % 500 mL IVPB  1,750 mg Intravenous Once Fausto Matamoros MD        glucose (GLUTOSE) 40 % oral gel 15 g  15 g Oral PRN Fausto Matamoros MD        dextrose 50 % IV solution  12.5 g Intravenous PRN Fausto Matamoros MD        glucagon (rDNA) injection 1 mg  1 mg Intramuscular PRN Fausto Matamoros MD        dextrose 5 % solution  100 mL/hr Intravenous PRN Fausto Matamoros MD        [facility]) 400 mcg in sodium chloride 0.9 % 100 mL infusion  0.2 mcg/kg/hr Intravenous Continuous Fausto Matamoros MD 4.7 mL/hr at 12/27/20 1053 0.2 mcg/kg/hr at 12/27/20 1053    pantoprazole (PROTONIX) injection 40 mg  40 mg Intravenous BID Sheeba Alarcon DO        0.9 % sodium chloride bolus  500 mL Intravenous Once Fausto Matamoros  mL/hr at 12/27/20 1148 500 mL at 12/27/20 1148    insulin lispro (HUMALOG) injection vial 0-12 Units  0-12 Units Subcutaneous Q6H Fausto Matamoros MD        aspirin EC tablet 81 mg  81 mg Oral Daily Martinez Walters MD   81 mg at 12/26/20 0902    atorvastatin (LIPITOR) tablet 40 mg  40 mg Oral Nightly Martinez Walters MD   40 mg at 12/26/20 2049    clopidogrel (PLAVIX) tablet 75 mg  75 mg Oral Daily Martinez Walters MD   75 mg at 12/26/20 0902    thiamine mononitrate tablet 100 mg  100 mg Oral Daily Martinez Walters MD   100 mg at 12/26/20 0902    sodium chloride flush 0.9 % injection 10 mL  10 mL Intravenous 2 times per day Elvis Jennings MD   10 mL at 12/27/20 0853    sodium chloride flush 0.9 % injection 10 mL  10 mL Intravenous PRN Martinez Walters MD        enoxaparin (LOVENOX) injection 40 mg  40 mg Subcutaneous Daily Martinez Walters MD   40 mg at 12/27/20 8013  promethazine (PHENERGAN) tablet 12.5 mg  12.5 mg Oral Q6H PRN Martinez Walters MD        Or    ondansetron (ZOFRAN) injection 4 mg  4 mg Intravenous Q6H PRN Martinez Walters MD   4 mg at 12/26/20 0606    polyethylene glycol (GLYCOLAX) packet 17 g  17 g Oral Daily PRN Martinez Walters MD        acetaminophen (TYLENOL) tablet 650 mg  650 mg Oral Q6H PRN Martinez Walters MD        Or    acetaminophen (TYLENOL) suppository 650 mg  650 mg Rectal Q6H PRN Martinez Walters MD   650 mg at 12/26/20 2325    LORazepam (ATIVAN) tablet 1 mg  1 mg Oral Q1H PRSANDRA Coffman MD        Or    LORazepam (ATIVAN) injection 1 mg  1 mg Intravenous Q1H PRSANDRA Coffman MD        Or    LORazepam (ATIVAN) tablet 2 mg  2 mg Oral Q1H PRN Micheal Coffman MD        Or    LORazepam (ATIVAN) injection 2 mg  2 mg Intravenous Q1H PRSANDRA Coffman MD   2 mg at 12/26/20 2226    Or    LORazepam (ATIVAN) tablet 3 mg  3 mg Oral Q1H PRSANDRA Coffman MD        Or    LORazepam (ATIVAN) injection 3 mg  3 mg Intravenous Q1H PRSANDRA Coffman MD   3 mg at 12/26/20 2326    Or    LORazepam (ATIVAN) tablet 4 mg  4 mg Oral Q1H PRN Micheal Coffman MD        Or    LORazepam (ATIVAN) injection 4 mg  4 mg Intravenous Q1H PRN Micheal Coffman MD           XR CHEST PORTABLE   Final Result   Left basilar pulmonary opacity which may represent atelectasis, pneumonia,   effusion or a combination thereof. XR SHOULDER LEFT (MIN 2 VIEWS)   Final Result   No acute fracture or dislocation in the left shoulder. XR CHEST (2 VW)   Final Result   No acute cardiopulmonary process.       XR CHEST PORTABLE    (Results Pending)         Labs:    CBC:   Recent Labs     12/26/20  0124 12/26/20  1706 12/27/20  0524   WBC 11.7* 14.2* 15.5*   HGB 10.6* 9.4* 8.9*    160 222        No results found for: IRON, TIBC, FERRITIN    Lab Results   Component Value Date    CALCIUM 7.9 (L) 12/27/2020    CALCIUM 8.1 (L) 12/27/2020    CALCIUM 7.9 (L) 12/26/2020 PHOS 3.7 11/12/2015    PHOS 3.7 11/11/2015    MG 2.1 11/12/2015    MG 2.1 11/11/2015       BMP:   Recent Labs     12/26/20  2300 12/26/20  2300 12/27/20  0524 12/27/20  0720 12/27/20  0952 12/27/20  1042   *   < > 113*  114* 113* 114* 114*   K 3.8  --  4.3  --   --  4.7   CL 74*  --  77*  --   --  77*   CO2 20*  --  19*  --   --  20*   BUN 23  --  33*  --   --  39*   CREATININE 1.3*  --  2.1*  --   --  2.6*   GLUCOSE 187*  --  139*  --   --  167*    < > = values in this interval not displayed. Assessment: / Plan:    1. Severe hyponatremia. Khris Fallow Hyponatremia believed to be secondary to multiple factors including thiazide diuretic use, significant history of alcohol abuse in the form of beer limiting free water excretion due to low solute load as well as possibility of underlying SIADH. 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 The current urine studies have to be interpreted in the, context of patient being hypotensive and consequently patient will have a tendency for renal sodium retention. Patient was given D5W and given DDAVP because of the rise in the serum sodium. Will attempt to continue to raise serum sodium level slowly. Correct electrolytes every 2 hours. 2.  Acute kidney injury. Patient has had drop in the urine output and rise in the BUN and creatinine which probably reflects renal hypoperfusion due to hypotension. Agree with supporting with volume. Avoid diuretics. .      3.  Metabolic acidosis with increased anion gap. Patient with a lactic acid of 6.3 which may be secondary to tissue hypoperfusion as well as use of Metformin. Metformin has been discontinued. Patient has been started on a bicarbonate infusion as per intensivist.  Target bicarbonate level 22 mmol/L.           4.   Hypotension. Patient given fluid bolus. May need pressor support. Will follow. Rule out sepsis. 5.  Anemia. .  Volume depletion may have masked the severity of anemia upon presentation. Hemoglobin lower and may reflect dilution. Check serum iron studies. 6.  Hypocalcemia. Will check ionized calcium as well as check vitamin D and PTH levels. Prognosis guarded. Jaciel Figueroa MD  Nephrology  Electronically signed by Jaciel Figueroa MD on 12/27/2020 at 11:56 AM      Note: This report was completed utilizing computer voice recognition software. Every effort has been made to ensure accuracy, however; inadvertent computerized transcription errors may be present.

## 2020-12-27 NOTE — PROGRESS NOTES
Perfect Serve Message to Dr. Zurdo Irene:       12/27/20 8:35 AM   [phone] 2 ICU From: [facility] [address] RE: Kylee Frank RM: ICU 5 Current Sodium 113 - previous Sodium 113 The D5W is paused per Dr. Rick Wall to administer a 500 NS bolus - patient is hypotensive 63/42 (39). Read 8:35 AM     12/27/20 8:35 AM   Ok    12/27/20 11:25 AM   0952  just resulted waiting for BMP results  Read 11:30 AM      12/27/20 11:25 AM   Dr. Rick Wall d/c'd the D5W and started a bicarb gtt 1 amp at 100. Read 11:30 AM     12/27/20 1:47 PM   1300 Na 113; urine Na <20; urine creatinine 135; Eosinophil is still processing. Read 1:47 PM     12/27/20 1:47 PM   Ik    12/27/20 1:48 PM   Dopamine has been started and the bicarb gtt is on hold - only one IV site until Dr. Rick Wall places a central line. Read 1:48 PM     12/27/20 3:59 PM   1512 Na 113 - finally restarted the Bicarb drip @ 100 at 1530. Wasn't able to obtain a line but did get a 2nd IV. Next BMP is 1700. Read 4:00 PM     12/27/20 4:00 PM   Ok. Thanks     12/27/20 6:07 PM   Eosinophil urine smear 0 Sodium 111Low Panic 132 - 146 mmol/L Final Potassium reflex Magnesium 4.6 3.5 - 5.0 mmol/L Final Chloride 78Low 98 - 107 mmol/L Final CO2 18Low 22 - 29 mmol/L Final Anion Gap 15 7 - 16 mmol/L Final Glucose 213High 74 - 99 mg/dL Final BUN 44High 8 - 23 mg/dL Final CREATININE 2.8High 0.7 - 1.2 mg/dL Final GFR Non-African American 23 >=60 mL/min/1.73 Final Chronic Kidney Disease: less than 60 ml/min/1.73 sq.m. Kidney Failure: less than 15 ml/min/1.73 sq.m. Results valid for patients 18 years and older. GFR African American 28 Final Calcium 7.3Low 8.6 - 10.2 mg/dL Final  Read 6:08 PM     1810 - p/c from Dr. Zurdo Irene - updated on fluids, BP & U/O. Obtain sodium in 2 hrs and call him with the results.     Keesha Dayron  12/27/2020

## 2020-12-27 NOTE — PROGRESS NOTES
Patient does not want to do database at this time. He is tired and wants to sleep.     Valerio Sports  12/26/2020

## 2020-12-27 NOTE — CONSULTS
Neurology: Denies any bowel or bladder incontinence, headache or focal neurological deficits. No weakness or paresthesia. Denies numbness or tingling in the hands or feet. Psychiatric: Denies nervousness/anxiety, depression or memory loss. Past Medical History:  Past Medical History:   Diagnosis Date    Cerebral artery occlusion with cerebral infarction ([facility] [address]     Diabetes mellitus ([facility] [address]     Type 2    Hypertension        Past Surgical History:  Past Surgical History:   Procedure Laterality Date    FINGER AMPUTATION      KNEE ARTHROSCOPY      TONSILLECTOMY         Home Medications:  Prior to Admission medications    Medication Sig Start Date End Date Taking?  Authorizing Provider   aspirin 81 MG EC tablet Take 81 mg by mouth daily    Historical Provider, MD   hydroCHLOROthiazide (HYDRODIURIL) 25 MG tablet Take 25 mg by mouth daily    Historical Provider, MD   acetaminophen 650 MG TABS Take 650 mg by mouth every 4 hours as needed 11/16/15   Roselind Simmering, DO   glimepiride (AMARYL) 2 MG tablet Take 1 tablet by mouth daily (with breakfast) 11/16/15   Roselind Simmering, DO   insulin lispro (HUMALOG) 100 UNIT/ML injection vial Inject 0-12 Units into the skin 3 times daily (with meals)  Patient not taking: Reported on 6/18/2020 11/16/15   Roselind Simmering, DO   insulin lispro (HUMALOG) 100 UNIT/ML injection vial Inject 0-6 Units into the skin nightly  Patient not taking: Reported on 6/18/2020 11/16/15   Roselind Simmering, DO   metFORMIN (GLUCOPHAGE) 500 MG tablet Take 1 tablet by mouth 2 times daily (with meals) 11/16/15   Roselind Simmering, DO   atorvastatin (LIPITOR) 40 MG tablet Take 1 tablet by mouth nightly 11/16/15   Roselind Simmering, DO   clopidogrel (PLAVIX) 75 MG tablet Take 1 tablet by mouth daily  Patient not taking: Reported on 6/18/2020 11/16/15   Roselind Simmering, DO   vitamin B-1 100 MG tablet Take 1 tablet by mouth daily  Patient not taking: Reported on 6/18/2020 11/16/15   Roselind Simmering, DO Allergies: Pcn [penicillins]    Social History:  Social History     Socioeconomic History    Marital status: Single     Spouse name: Not on file    Number of children: Not on file    Years of education: Not on file    Highest education level: Not on file   Occupational History    Not on file   Social Needs    Financial resource strain: Not on file    Food insecurity     Worry: Not on file     Inability: Not on file    Transportation needs     Medical: Not on file     Non-medical: Not on file   Tobacco Use    Smoking status: Former Smoker    Smokeless tobacco: Never Used   Substance and Sexual Activity    Alcohol use: Yes     Frequency: 4 or more times a week     Drinks per session: 5 or 6     Binge frequency: Daily or almost daily     Comment: every day drinker for most of adult life    Drug use: No    Sexual activity: Not on file   Lifestyle    Physical activity     Days per week: Not on file     Minutes per session: Not on file    Stress: Not on file   Relationships    Social connections     Talks on phone: Not on file     Gets together: Not on file     Attends [employer] service: Not on file     Active member of club or organization: Not on file     Attends meetings of clubs or organizations: Not on file     Relationship status: Not on file    Intimate partner violence     Fear of current or ex partner: Not on file     Emotionally abused: Not on file     Physically abused: Not on file     Forced sexual activity: Not on file   Other Topics Concern    Not on file   Social History Narrative    Not on file       Family History:  History reviewed. No pertinent family history.       PHYSICAL EXAM:  Vital Signs: BP (!) 97/52   Pulse 96   Temp 97.8 °F (36.6 °C) (Axillary)   Resp (!) 42   Ht 5' 10\" (1.778 m)   Wt 208 lb 1.6 oz (94.4 kg)   SpO2 (!) 88%   BMI 29.86 kg/m²   GENERAL APPEARANCE:  awake, alert, oriented, cooperative, and in no acute distress EYES:  Lids and lashes normal, PERRLA, EOMI, sclera clear, conjunctiva normal  HENT:  Normocephalic, without obvious abnormality, atramatic, sinuses nontender on palpation, external ears without lesions, oral pharynx with moist mucus membranes, tonsils without erythema or exudates  NECK:  Supple with no carotid bruits, JVD or thyromegaly. No cervical adenopathy  LUNGS:  bilaterally with wheezes, rales. CARDIOVASCULAR: Regular rate and rhythm, no murmur  ABDOMEN:  normal bowel sounds in all 4 quadrants, soft, non-distended, non-tender, no masses palpated, no hepatosplenomegally  MUSCULOSKELETAL:  There is no redness, warmth, or swelling of the joints. Full range of motion noted. Motor strength is 5 out of 5 all extremities bilaterally. Tone is normal.  EXTREMITIES: No edema, 2+ pulses bilaterally (radial and dorsalis pedis)  NEUROLOGIC:  Awake, alert, oriented to name, place and time. Cranial nerves II-XII are grossly intact. Motor is 5 out of 5 bilaterally. SKIN: Normal skin color, texture, and turgor. There is no redness, warmth, or swelling. No bruising or bleeding, no mottling. No rashes and no jaundice. No pressure ulcers noted on visible skin. Sacrum and buttocks not examined at this time due to patient's inability to turn over and position in hospital bed. PSYCH: Affect, behavior and insight are all within normal limits.       DATA:  Results for orders placed or performed during the hospital encounter of 93/19/64   Basic metabolic panel   Result Value Ref Range    Sodium 102 (LL) 132 - 146 mmol/L    Potassium 3.7 3.5 - 5.0 mmol/L    Chloride 63 (LL) 98 - 107 mmol/L    CO2 19 (L) 22 - 29 mmol/L    Anion Gap 20 (H) 7 - 16 mmol/L    Glucose 144 (H) 74 - 99 mg/dL    BUN 9 8 - 23 mg/dL    CREATININE 0.6 (L) 0.7 - 1.2 mg/dL    GFR Non-African American >60 >=60 mL/min/1.73    GFR African American >60     Calcium 8.8 8.6 - 10.2 mg/dL   CBC   Result Value Ref Range    WBC 11.7 (H) 4.5 - 11.5 E9/L RBC 3.72 (L) 3.80 - 5.80 E12/L    Hemoglobin 10.6 (L) 12.5 - 16.5 g/dL    Hematocrit 29.1 (L) 37.0 - 54.0 %    MCV 78.2 (L) 80.0 - 99.9 fL    MCH 28.5 26.0 - 35.0 pg    MCHC 36.4 (H) 32.0 - 34.5 %    RDW 13.4 11.5 - 15.0 fL    Platelets 044 147 - 507 E9/L    MPV 9.2 7.0 - 12.0 fL   Troponin   Result Value Ref Range    Troponin 0.07 (H) 0.00 - 0.03 ng/mL   Brain Natriuretic Peptide   Result Value Ref Range    Pro- (H) 0 - 125 pg/mL   COVID-19   Result Value Ref Range    SARS-CoV-2, NAAT Not Detected Not Detected   Lactic Acid, Plasma   Result Value Ref Range    Lactic Acid 4.9 (HH) 0.5 - 2.2 mmol/L   Hemoglobin A1c   Result Value Ref Range    Hemoglobin A1C 5.5 4.0 - 5.6 %   Basic Metabolic Panel w/ Reflex to MG   Result Value Ref Range    Sodium 102 (LL) 132 - 146 mmol/L    Potassium reflex Magnesium 3.7 3.5 - 5.0 mmol/L    Chloride 68 (LL) 98 - 107 mmol/L    CO2 22 22 - 29 mmol/L    Anion Gap 12 7 - 16 mmol/L    Glucose 181 (H) 74 - 99 mg/dL    BUN 10 8 - 23 mg/dL    CREATININE 0.7 0.7 - 1.2 mg/dL    GFR Non-African American >60 >=60 mL/min/1.73    GFR African American >60     Calcium 7.9 (L) 8.6 - 10.2 mg/dL   Basic Metabolic Panel w/ Reflex to MG   Result Value Ref Range    Sodium 104 (LL) 132 - 146 mmol/L    Potassium reflex Magnesium 3.9 3.5 - 5.0 mmol/L    Chloride 71 (LL) 98 - 107 mmol/L    CO2 22 22 - 29 mmol/L    Anion Gap 11 7 - 16 mmol/L    Glucose 199 (H) 74 - 99 mg/dL    BUN 13 8 - 23 mg/dL    CREATININE 0.8 0.7 - 1.2 mg/dL    GFR Non-African American >60 >=60 mL/min/1.73    GFR African American >60     Calcium 7.9 (L) 8.6 - 10.2 mg/dL   CK MB   Result Value Ref Range    CK-MB 27.7 (H) 0.0 - 7.7 ng/mL   CK MB   Result Value Ref Range    CK-MB 22.1 (H) 0.0 - 7.7 ng/mL   CK   Result Value Ref Range    Total CK 3,059 (H) 20 - 200 U/L   CK   Result Value Ref Range    Total CK 2,273 (H) 20 - 200 U/L   Troponin   Result Value Ref Range    Troponin 0.10 (H) 0.00 - 0.03 ng/mL   Troponin Result Value Ref Range    Troponin 0.06 (H) 0.00 - 0.03 ng/mL   SODIUM, URINE, RANDOM   Result Value Ref Range    Sodium, Ur 30 Not Established mmol/L   OSMOLALITY, URINE   Result Value Ref Range    Osmolality, Ur 565 300 - 900 mOsm/kg   Osmolality, Serum   Result Value Ref Range    Osmolality 226 (L) 285 - 310 mOsm/Kg   Uric acid   Result Value Ref Range    Uric Acid, Serum 3.3 (L) 3.4 - 7.0 mg/dL   TSH without Reflex   Result Value Ref Range    TSH 0.429 0.270 - 4.200 uIU/mL   Basic Metabolic Panel w/ Reflex to MG   Result Value Ref Range    Sodium 106 (LL) 132 - 146 mmol/L    Potassium reflex Magnesium 3.8 3.5 - 5.0 mmol/L    Chloride 74 (LL) 98 - 107 mmol/L    CO2 20 (L) 22 - 29 mmol/L    Anion Gap 12 7 - 16 mmol/L    Glucose 187 (H) 74 - 99 mg/dL    BUN 23 8 - 23 mg/dL    CREATININE 1.3 (H) 0.7 - 1.2 mg/dL    GFR Non-African American 56 >=60 mL/min/1.73    GFR African American >60     Calcium 7.9 (L) 8.6 - 10.2 mg/dL   Basic Metabolic Panel   Result Value Ref Range    Sodium 103 (LL) 132 - 146 mmol/L    Potassium 3.9 3.5 - 5.0 mmol/L    Chloride 67 (LL) 98 - 107 mmol/L    CO2 22 22 - 29 mmol/L    Anion Gap 14 7 - 16 mmol/L    Glucose 190 (H) 74 - 99 mg/dL    BUN 10 8 - 23 mg/dL    CREATININE 0.7 0.7 - 1.2 mg/dL    GFR Non-African American >60 >=60 mL/min/1.73    GFR African American >60     Calcium 8.1 (L) 8.6 - 10.2 mg/dL   Sodium Lab   Result Value Ref Range    Sodium 105 (LL) 132 - 146 mmol/L   Sodium Lab   Result Value Ref Range    Sodium 106 (LL) 132 - 146 mmol/L   SODIUM   Result Value Ref Range    Sodium 105 (LL) 132 - 146 mmol/L   Sodium Lab   Result Value Ref Range    Sodium 107 (LL) 132 - 146 mmol/L   CBC   Result Value Ref Range    WBC 14.2 (H) 4.5 - 11.5 E9/L    RBC 3.26 (L) 3.80 - 5.80 E12/L    Hemoglobin 9.4 (L) 12.5 - 16.5 g/dL    Hematocrit 25.5 (L) 37.0 - 54.0 %    MCV 78.2 (L) 80.0 - 99.9 fL    MCH 28.8 26.0 - 35.0 pg    MCHC 36.9 (H) 32.0 - 34.5 %    RDW 13.6 11.5 - 15.0 fL Platelets 942 961 - 924 E9/L    MPV 9.8 7.0 - 12.0 fL   Sodium Lab   Result Value Ref Range    Sodium 113 (LL) 132 - 146 mmol/L   Basic Metabolic Panel w/ Reflex to MG   Result Value Ref Range    Sodium 113 (LL) 132 - 146 mmol/L    Potassium reflex Magnesium 4.3 3.5 - 5.0 mmol/L    Chloride 77 (L) 98 - 107 mmol/L    CO2 19 (L) 22 - 29 mmol/L    Anion Gap 17 (H) 7 - 16 mmol/L    Glucose 139 (H) 74 - 99 mg/dL    BUN 33 (H) 8 - 23 mg/dL    CREATININE 2.1 (H) 0.7 - 1.2 mg/dL    GFR Non-African American 32 >=60 mL/min/1.73    GFR African American 39     Calcium 8.1 (L) 8.6 - 10.2 mg/dL   CBC auto differential   Result Value Ref Range    WBC 15.5 (H) 4.5 - 11.5 E9/L    RBC 3.11 (L) 3.80 - 5.80 E12/L    Hemoglobin 8.9 (L) 12.5 - 16.5 g/dL    Hematocrit 24.7 (L) 37.0 - 54.0 %    MCV 79.4 (L) 80.0 - 99.9 fL    MCH 28.6 26.0 - 35.0 pg    MCHC 36.0 (H) 32.0 - 34.5 %    RDW 13.9 11.5 - 15.0 fL    Platelets 064 310 - 055 E9/L    MPV 10.0 7.0 - 12.0 fL    Neutrophils % 82.9 (H) 43.0 - 80.0 %    Immature Granulocytes % 0.6 0.0 - 5.0 %    Lymphocytes % 10.8 (L) 20.0 - 42.0 %    Monocytes % 5.0 2.0 - 12.0 %    Eosinophils % 0.6 0.0 - 6.0 %    Basophils % 0.1 0.0 - 2.0 %    Neutrophils Absolute 12.85 (H) 1.80 - 7.30 E9/L    Immature Granulocytes # 0.09 E9/L    Lymphocytes Absolute 1.67 1.50 - 4.00 E9/L    Monocytes Absolute 0.78 0.10 - 0.95 E9/L    Eosinophils Absolute 0.09 0.05 - 0.50 E9/L    Basophils Absolute 0.02 0.00 - 0.20 E9/L   Sodium Lab   Result Value Ref Range    Sodium 114 (LL) 132 - 146 mmol/L   SODIUM, URINE, RANDOM   Result Value Ref Range    Sodium, Ur <20 Not Established mmol/L   Osmolality, urine   Result Value Ref Range    Osmolality, Ur 602 300 - 900 mOsm/kg   Sodium Lab   Result Value Ref Range    Sodium 113 (LL) 132 - 146 mmol/L   Osmolality, urine   Result Value Ref Range    Osmolality, Ur 286 (L) 300 - 900 mOsm/kg   Sodium, urine, random   Result Value Ref Range    Sodium, Ur 50 Not Established mmol/L EXAMINATION: TWO XRAY VIEWS OF THE CHEST 12/26/2020 1:27 am COMPARISON: 11/11/2015 HISTORY: ORDERING SYSTEM PROVIDED HISTORY: shortness of breath TECHNOLOGIST PROVIDED HISTORY: Reason for exam:->shortness of breath FINDINGS: Cardiac silhouette top-normal.  Lungs grossly clear. No sizable pleural effusion. No pneumothorax. Multilevel thoracic spondylosis. Multiple old healed left rib fractures noted. No acute cardiopulmonary process. Xr Shoulder Left (min 2 Views)    Result Date: 12/26/2020  EXAMINATION: THREE XRAY VIEWS OF THE LEFT SHOULDER 12/26/2020 1:27 am COMPARISON: None. HISTORY: ORDERING SYSTEM PROVIDED HISTORY: fall TECHNOLOGIST PROVIDED HISTORY: Reason for exam:->fall FINDINGS: There is no acute fracture or dislocation in the left shoulder. The bones are somewhat osteopenic. Degenerative changes of glenohumeral joint are seen. The humeral head is high-riding, suggestive of chronic rotator cuff tear. Multiple old healed left rib fractures are seen. No acute fracture or dislocation in the left shoulder. Xr Chest Portable    Result Date: 12/27/2020  EXAMINATION: ONE XRAY VIEW OF THE CHEST 12/27/2020 8:38 am COMPARISON: 12/26/2020 HISTORY: ORDERING SYSTEM PROVIDED HISTORY: SOB TECHNOLOGIST PROVIDED HISTORY: Reason for exam:->SOB FINDINGS: Left basilar pulmonary opacity is seen. The right lung is grossly clear. The cardiomediastinal contour is unremarkable. No pneumothorax is seen. Old healed left posterior rib fractures are noted    Left basilar pulmonary opacity which may represent atelectasis, pneumonia, effusion or a combination thereof. ASSESSMENT:  Principal Problem:    Hyponatremia  Active Problems:    CVA (cerebral vascular accident) ([address]    Diabetes mellitus ([facility] [address]  Resolved Problems:    * No resolved hospital problems. *      PLAN:    1.  Acute on Chronic anemia - microcytic, hypochromic, + coffee ground emesis - Daily alcohol, not overtly cirrhotic, check BW and RUQ ultrasound  - Serial H/H Q8 Hrs, transfuse PRN  - Will plan for EGD once patient is medically stable with correction of electrolyte abnormalities  -  Increase PPI to IV BID    2. Multiple Electrolyte abnormalities - Severe hyponatremia  - Na currently 114, defer to admitting    3. Acute Kidney Injury    4. Hx CVA      Please see orders for further plan of care.     Elyssa Pfeiffer D.O.   12/27/2020 at 10:55 AM

## 2020-12-27 NOTE — PROGRESS NOTES
P/c to Dr. Gregorio Dumont - updated with pt's status & need for a central line per Dr. Chadwick Callahan. Either him or Dr. Isael Reynolds will place a line today.     Layton Lemus  12/27/2020

## 2020-12-27 NOTE — PROGRESS NOTES
P/c from pt's brother Holli Davila - updated with pt's status. He states that the pt is an alcoholic and can be difficult. Central line consent was obtained with 2nd RN verification by Lamberto Gill. Consent is in the pt's soft chart.     Nicole Sanchez  12/27/2020

## 2020-12-27 NOTE — PROGRESS NOTES
P/c from Rossi in Lab - critical Na 114. Perfect Serve message to Dr. Zahra Juarez.     Dalia Barr  12/27/2020

## 2020-12-28 ENCOUNTER — APPOINTMENT (OUTPATIENT)
Dept: ULTRASOUND IMAGING | Age: 60
DRG: 871 | End: 2020-12-28
Payer: MEDICARE

## 2020-12-28 ENCOUNTER — APPOINTMENT (OUTPATIENT)
Dept: GENERAL RADIOLOGY | Age: 60
DRG: 871 | End: 2020-12-28
Payer: MEDICARE

## 2020-12-28 LAB
ABO/RH: NORMAL
ACANTHOCYTES: ABNORMAL
ALBUMIN SERPL-MCNC: 2.9 G/DL (ref 3.5–5.2)
ALP BLD-CCNC: 49 U/L (ref 40–129)
ALT SERPL-CCNC: 280 U/L (ref 0–40)
ANION GAP SERPL CALCULATED.3IONS-SCNC: 10 MMOL/L (ref 7–16)
ANION GAP SERPL CALCULATED.3IONS-SCNC: 10 MMOL/L (ref 7–16)
ANION GAP SERPL CALCULATED.3IONS-SCNC: 11 MMOL/L (ref 7–16)
ANION GAP SERPL CALCULATED.3IONS-SCNC: 12 MMOL/L (ref 7–16)
ANION GAP SERPL CALCULATED.3IONS-SCNC: 8 MMOL/L (ref 7–16)
ANION GAP SERPL CALCULATED.3IONS-SCNC: 9 MMOL/L (ref 7–16)
ANTIBODY SCREEN: NORMAL
AST SERPL-CCNC: 442 U/L (ref 0–39)
BASOPHILS ABSOLUTE: 0 E9/L (ref 0–0.2)
BASOPHILS RELATIVE PERCENT: 0.1 % (ref 0–2)
BILIRUB SERPL-MCNC: 1.9 MG/DL (ref 0–1.2)
BLOOD BANK DISPENSE STATUS: NORMAL
BLOOD BANK PRODUCT CODE: NORMAL
BPU ID: NORMAL
BUN BLDV-MCNC: 39 MG/DL (ref 8–23)
BUN BLDV-MCNC: 39 MG/DL (ref 8–23)
BUN BLDV-MCNC: 42 MG/DL (ref 8–23)
BUN BLDV-MCNC: 42 MG/DL (ref 8–23)
BUN BLDV-MCNC: 44 MG/DL (ref 8–23)
BUN BLDV-MCNC: 44 MG/DL (ref 8–23)
BURR CELLS: ABNORMAL
CALCIUM IONIZED: 1.13 MMOL/L (ref 1.15–1.33)
CALCIUM SERPL-MCNC: 7.7 MG/DL (ref 8.6–10.2)
CALCIUM SERPL-MCNC: 7.8 MG/DL (ref 8.6–10.2)
CALCIUM SERPL-MCNC: 7.8 MG/DL (ref 8.6–10.2)
CALCIUM SERPL-MCNC: 7.9 MG/DL (ref 8.6–10.2)
CHLORIDE BLD-SCNC: 79 MMOL/L (ref 98–107)
CHLORIDE BLD-SCNC: 81 MMOL/L (ref 98–107)
CHLORIDE BLD-SCNC: 85 MMOL/L (ref 98–107)
CHLORIDE BLD-SCNC: 85 MMOL/L (ref 98–107)
CHLORIDE BLD-SCNC: 89 MMOL/L (ref 98–107)
CHLORIDE BLD-SCNC: 91 MMOL/L (ref 98–107)
CO2: 19 MMOL/L (ref 22–29)
CO2: 22 MMOL/L (ref 22–29)
CO2: 23 MMOL/L (ref 22–29)
CORTISOL TOTAL: 120 MCG/DL (ref 2.68–18.4)
CREAT SERPL-MCNC: 1.1 MG/DL (ref 0.7–1.2)
CREAT SERPL-MCNC: 1.3 MG/DL (ref 0.7–1.2)
CREAT SERPL-MCNC: 1.4 MG/DL (ref 0.7–1.2)
DESCRIPTION BLOOD BANK: NORMAL
EOSINOPHILS ABSOLUTE: 0 E9/L (ref 0.05–0.5)
EOSINOPHILS RELATIVE PERCENT: 0.1 % (ref 0–6)
FERRITIN: 125 NG/ML
GFR AFRICAN AMERICAN: >60
GFR NON-AFRICAN AMERICAN: 51 ML/MIN/1.73
GFR NON-AFRICAN AMERICAN: 56 ML/MIN/1.73
GFR NON-AFRICAN AMERICAN: >60 ML/MIN/1.73
GLUCOSE BLD-MCNC: 166 MG/DL (ref 74–99)
GLUCOSE BLD-MCNC: 171 MG/DL (ref 74–99)
GLUCOSE BLD-MCNC: 177 MG/DL (ref 74–99)
GLUCOSE BLD-MCNC: 181 MG/DL (ref 74–99)
GLUCOSE BLD-MCNC: 183 MG/DL (ref 74–99)
GLUCOSE BLD-MCNC: 236 MG/DL (ref 74–99)
HCT VFR BLD CALC: 18 % (ref 37–54)
HCT VFR BLD CALC: 18.2 % (ref 37–54)
HCT VFR BLD CALC: 18.2 % (ref 37–54)
HCT VFR BLD CALC: 20.3 % (ref 37–54)
HCT VFR BLD CALC: 21.7 % (ref 37–54)
HEMOGLOBIN: 6.2 G/DL (ref 12.5–16.5)
HEMOGLOBIN: 6.2 G/DL (ref 12.5–16.5)
HEMOGLOBIN: 7.3 G/DL (ref 12.5–16.5)
HEMOGLOBIN: 7.6 G/DL (ref 12.5–16.5)
IMMATURE RETIC FRACT: 28.3 % (ref 2.3–13.4)
IRON SATURATION: 7 % (ref 20–55)
IRON: 17 MCG/DL (ref 59–158)
LACTIC ACID: 2.5 MMOL/L (ref 0.5–2.2)
LYMPHOCYTES ABSOLUTE: 0.73 E9/L (ref 1.5–4)
LYMPHOCYTES RELATIVE PERCENT: 9.6 % (ref 20–42)
MAGNESIUM: 1.9 MG/DL (ref 1.6–2.6)
MCH RBC QN AUTO: 28.4 PG (ref 26–35)
MCHC RBC AUTO-ENTMCNC: 34.4 % (ref 32–34.5)
MCV RBC AUTO: 82.6 FL (ref 80–99.9)
METER GLUCOSE: 192 MG/DL (ref 74–99)
METER GLUCOSE: 199 MG/DL (ref 74–99)
METER GLUCOSE: 202 MG/DL (ref 74–99)
METER GLUCOSE: 241 MG/DL (ref 74–99)
MONOCYTES ABSOLUTE: 0.07 E9/L (ref 0.1–0.95)
MONOCYTES RELATIVE PERCENT: 0.9 % (ref 2–12)
MRSA CULTURE ONLY: NORMAL
NEUTROPHILS ABSOLUTE: 6.57 E9/L (ref 1.8–7.3)
NEUTROPHILS RELATIVE PERCENT: 89.5 % (ref 43–80)
NUCLEATED RED BLOOD CELLS: 0.9 /100 WBC
OSMOLALITY URINE: 381 MOSM/KG (ref 300–900)
OSMOLALITY URINE: 388 MOSM/KG (ref 300–900)
OSMOLALITY URINE: 388 MOSM/KG (ref 300–900)
OSMOLALITY URINE: 391 MOSM/KG (ref 300–900)
OSMOLALITY URINE: 399 MOSM/KG (ref 300–900)
PARATHYROID HORMONE INTACT: 60 PG/ML (ref 15–65)
PDW BLD-RTO: 13.7 FL (ref 11.5–15)
PHOSPHORUS: 2.6 MG/DL (ref 2.5–4.5)
PLATELET # BLD: 108 E9/L (ref 130–450)
PMV BLD AUTO: 10.5 FL (ref 7–12)
POIKILOCYTES: ABNORMAL
POLYCHROMASIA: ABNORMAL
POTASSIUM REFLEX MAGNESIUM: 3.9 MMOL/L (ref 3.5–5)
POTASSIUM REFLEX MAGNESIUM: 4 MMOL/L (ref 3.5–5)
POTASSIUM REFLEX MAGNESIUM: 4.1 MMOL/L (ref 3.5–5)
POTASSIUM REFLEX MAGNESIUM: 4.3 MMOL/L (ref 3.5–5)
POTASSIUM REFLEX MAGNESIUM: 4.4 MMOL/L (ref 3.5–5)
POTASSIUM SERPL-SCNC: 3.9 MMOL/L (ref 3.5–5)
RBC # BLD: 2.18 E12/L (ref 3.8–5.8)
RETIC HGB EQUIVALENT: 36.2 PG (ref 28.2–36.6)
RETICULOCYTE ABSOLUTE COUNT: 0.08 E12/L
RETICULOCYTE COUNT PCT: 3.6 % (ref 0.4–1.9)
SODIUM BLD-SCNC: 110 MMOL/L (ref 132–146)
SODIUM BLD-SCNC: 111 MMOL/L (ref 132–146)
SODIUM BLD-SCNC: 113 MMOL/L (ref 132–146)
SODIUM BLD-SCNC: 114 MMOL/L (ref 132–146)
SODIUM BLD-SCNC: 115 MMOL/L (ref 132–146)
SODIUM BLD-SCNC: 117 MMOL/L (ref 132–146)
SODIUM BLD-SCNC: 118 MMOL/L (ref 132–146)
SODIUM BLD-SCNC: 120 MMOL/L (ref 132–146)
SODIUM BLD-SCNC: 121 MMOL/L (ref 132–146)
SODIUM URINE: <20 MMOL/L
TARGET CELLS: ABNORMAL
TOTAL IRON BINDING CAPACITY: 232 MCG/DL (ref 250–450)
TOTAL PROTEIN: 5.1 G/DL (ref 6.4–8.3)
TSH SERPL DL<=0.05 MIU/L-ACNC: 0.2 UIU/ML (ref 0.27–4.2)
VITAMIN D 25-HYDROXY: 12 NG/ML (ref 30–100)
WBC # BLD: 7.3 E9/L (ref 4.5–11.5)

## 2020-12-28 PROCEDURE — 83970 ASSAY OF PARATHORMONE: CPT

## 2020-12-28 PROCEDURE — 86038 ANTINUCLEAR ANTIBODIES: CPT

## 2020-12-28 PROCEDURE — 84295 ASSAY OF SERUM SODIUM: CPT

## 2020-12-28 PROCEDURE — P9016 RBC LEUKOCYTES REDUCED: HCPCS

## 2020-12-28 PROCEDURE — 99291 CRITICAL CARE FIRST HOUR: CPT | Performed by: INTERNAL MEDICINE

## 2020-12-28 PROCEDURE — 83540 ASSAY OF IRON: CPT

## 2020-12-28 PROCEDURE — 2580000003 HC RX 258: Performed by: INTERNAL MEDICINE

## 2020-12-28 PROCEDURE — 84443 ASSAY THYROID STIM HORMONE: CPT

## 2020-12-28 PROCEDURE — 85025 COMPLETE CBC W/AUTO DIFF WBC: CPT

## 2020-12-28 PROCEDURE — 6360000002 HC RX W HCPCS: Performed by: INTERNAL MEDICINE

## 2020-12-28 PROCEDURE — 36430 TRANSFUSION BLD/BLD COMPNT: CPT

## 2020-12-28 PROCEDURE — 82306 VITAMIN D 25 HYDROXY: CPT

## 2020-12-28 PROCEDURE — 6370000000 HC RX 637 (ALT 250 FOR IP): Performed by: INTERNAL MEDICINE

## 2020-12-28 PROCEDURE — 83550 IRON BINDING TEST: CPT

## 2020-12-28 PROCEDURE — 84100 ASSAY OF PHOSPHORUS: CPT

## 2020-12-28 PROCEDURE — 86923 COMPATIBILITY TEST ELECTRIC: CPT

## 2020-12-28 PROCEDURE — 83735 ASSAY OF MAGNESIUM: CPT

## 2020-12-28 PROCEDURE — 82784 ASSAY IGA/IGD/IGG/IGM EACH: CPT

## 2020-12-28 PROCEDURE — 71045 X-RAY EXAM CHEST 1 VIEW: CPT

## 2020-12-28 PROCEDURE — 86850 RBC ANTIBODY SCREEN: CPT

## 2020-12-28 PROCEDURE — 2500000003 HC RX 250 WO HCPCS: Performed by: INTERNAL MEDICINE

## 2020-12-28 PROCEDURE — 83605 ASSAY OF LACTIC ACID: CPT

## 2020-12-28 PROCEDURE — 86901 BLOOD TYPING SEROLOGIC RH(D): CPT

## 2020-12-28 PROCEDURE — 82533 TOTAL CORTISOL: CPT

## 2020-12-28 PROCEDURE — 80053 COMPREHEN METABOLIC PANEL: CPT

## 2020-12-28 PROCEDURE — 86900 BLOOD TYPING SEROLOGIC ABO: CPT

## 2020-12-28 PROCEDURE — 94640 AIRWAY INHALATION TREATMENT: CPT

## 2020-12-28 PROCEDURE — C9113 INJ PANTOPRAZOLE SODIUM, VIA: HCPCS | Performed by: INTERNAL MEDICINE

## 2020-12-28 PROCEDURE — 85018 HEMOGLOBIN: CPT

## 2020-12-28 PROCEDURE — 85014 HEMATOCRIT: CPT

## 2020-12-28 PROCEDURE — 2000000000 HC ICU R&B

## 2020-12-28 PROCEDURE — 36415 COLL VENOUS BLD VENIPUNCTURE: CPT

## 2020-12-28 PROCEDURE — 76705 ECHO EXAM OF ABDOMEN: CPT

## 2020-12-28 PROCEDURE — 83935 ASSAY OF URINE OSMOLALITY: CPT

## 2020-12-28 PROCEDURE — 2700000000 HC OXYGEN THERAPY PER DAY

## 2020-12-28 PROCEDURE — 80074 ACUTE HEPATITIS PANEL: CPT

## 2020-12-28 PROCEDURE — 85045 AUTOMATED RETICULOCYTE COUNT: CPT

## 2020-12-28 PROCEDURE — 83516 IMMUNOASSAY NONANTIBODY: CPT

## 2020-12-28 PROCEDURE — 80048 BASIC METABOLIC PNL TOTAL CA: CPT

## 2020-12-28 PROCEDURE — 82728 ASSAY OF FERRITIN: CPT

## 2020-12-28 PROCEDURE — 94660 CPAP INITIATION&MGMT: CPT

## 2020-12-28 PROCEDURE — 82962 GLUCOSE BLOOD TEST: CPT

## 2020-12-28 PROCEDURE — 82330 ASSAY OF CALCIUM: CPT

## 2020-12-28 PROCEDURE — 84300 ASSAY OF URINE SODIUM: CPT

## 2020-12-28 RX ORDER — HEPARIN SODIUM (PORCINE) LOCK FLUSH IV SOLN 100 UNIT/ML 100 UNIT/ML
3 SOLUTION INTRAVENOUS PRN
Status: DISCONTINUED | OUTPATIENT
Start: 2020-12-28 | End: 2021-01-05 | Stop reason: HOSPADM

## 2020-12-28 RX ORDER — SODIUM CHLORIDE 0.9 % (FLUSH) 0.9 %
10 SYRINGE (ML) INJECTION PRN
Status: DISCONTINUED | OUTPATIENT
Start: 2020-12-28 | End: 2021-01-05 | Stop reason: HOSPADM

## 2020-12-28 RX ORDER — 0.9 % SODIUM CHLORIDE 0.9 %
20 INTRAVENOUS SOLUTION INTRAVENOUS ONCE
Status: DISCONTINUED | OUTPATIENT
Start: 2020-12-28 | End: 2021-01-05 | Stop reason: HOSPADM

## 2020-12-28 RX ORDER — HEPARIN SODIUM (PORCINE) LOCK FLUSH IV SOLN 100 UNIT/ML 100 UNIT/ML
3 SOLUTION INTRAVENOUS EVERY 12 HOURS SCHEDULED
Status: DISCONTINUED | OUTPATIENT
Start: 2020-12-28 | End: 2021-01-05 | Stop reason: HOSPADM

## 2020-12-28 RX ORDER — LIDOCAINE HYDROCHLORIDE 10 MG/ML
5 INJECTION, SOLUTION EPIDURAL; INFILTRATION; INTRACAUDAL; PERINEURAL ONCE
Status: DISCONTINUED | OUTPATIENT
Start: 2020-12-28 | End: 2021-01-05 | Stop reason: HOSPADM

## 2020-12-28 RX ORDER — DEXTROSE MONOHYDRATE 50 MG/ML
INJECTION, SOLUTION INTRAVENOUS CONTINUOUS
Status: DISCONTINUED | OUTPATIENT
Start: 2020-12-28 | End: 2020-12-30

## 2020-12-28 RX ORDER — SODIUM CHLORIDE 9 MG/ML
INJECTION, SOLUTION INTRAVENOUS CONTINUOUS
Status: DISCONTINUED | OUTPATIENT
Start: 2020-12-28 | End: 2020-12-28

## 2020-12-28 RX ORDER — DESMOPRESSIN ACETATE 4 UG/ML
0.5 INJECTION, SOLUTION INTRAVENOUS; SUBCUTANEOUS ONCE
Status: COMPLETED | OUTPATIENT
Start: 2020-12-28 | End: 2020-12-28

## 2020-12-28 RX ADMIN — HYDROCORTISONE SODIUM SUCCINATE 100 MG: 100 INJECTION, POWDER, FOR SOLUTION INTRAMUSCULAR; INTRAVENOUS at 07:17

## 2020-12-28 RX ADMIN — IPRATROPIUM BROMIDE AND ALBUTEROL SULFATE 1 AMPULE: .5; 3 SOLUTION RESPIRATORY (INHALATION) at 02:29

## 2020-12-28 RX ADMIN — IPRATROPIUM BROMIDE AND ALBUTEROL SULFATE 1 AMPULE: .5; 3 SOLUTION RESPIRATORY (INHALATION) at 16:12

## 2020-12-28 RX ADMIN — Medication 0.5 MCG/KG/HR: at 22:46

## 2020-12-28 RX ADMIN — DEXTROSE MONOHYDRATE: 50 INJECTION, SOLUTION INTRAVENOUS at 22:34

## 2020-12-28 RX ADMIN — IPRATROPIUM BROMIDE AND ALBUTEROL SULFATE 1 AMPULE: .5; 3 SOLUTION RESPIRATORY (INHALATION) at 08:45

## 2020-12-28 RX ADMIN — ENOXAPARIN SODIUM 40 MG: 40 INJECTION SUBCUTANEOUS at 10:04

## 2020-12-28 RX ADMIN — OCTREOTIDE ACETATE 50 MCG/HR: 500 INJECTION, SOLUTION INTRAVENOUS; SUBCUTANEOUS at 10:05

## 2020-12-28 RX ADMIN — IPRATROPIUM BROMIDE AND ALBUTEROL SULFATE 1 AMPULE: .5; 3 SOLUTION RESPIRATORY (INHALATION) at 05:27

## 2020-12-28 RX ADMIN — INSULIN LISPRO 4 UNITS: 100 INJECTION, SOLUTION INTRAVENOUS; SUBCUTANEOUS at 12:52

## 2020-12-28 RX ADMIN — HYDROCORTISONE SODIUM SUCCINATE 50 MG: 100 INJECTION, POWDER, FOR SOLUTION INTRAMUSCULAR; INTRAVENOUS at 21:05

## 2020-12-28 RX ADMIN — PANTOPRAZOLE SODIUM 40 MG: 40 INJECTION, POWDER, LYOPHILIZED, FOR SOLUTION INTRAVENOUS at 10:04

## 2020-12-28 RX ADMIN — THIAMINE HCL TAB 100 MG 100 MG: 100 TAB at 10:05

## 2020-12-28 RX ADMIN — SODIUM CHLORIDE: 9 INJECTION, SOLUTION INTRAVENOUS at 00:35

## 2020-12-28 RX ADMIN — PANTOPRAZOLE SODIUM 40 MG: 40 INJECTION, POWDER, LYOPHILIZED, FOR SOLUTION INTRAVENOUS at 21:05

## 2020-12-28 RX ADMIN — Medication 0.5 MCG/KG/HR: at 10:54

## 2020-12-28 RX ADMIN — INSULIN LISPRO 2 UNITS: 100 INJECTION, SOLUTION INTRAVENOUS; SUBCUTANEOUS at 18:45

## 2020-12-28 RX ADMIN — Medication 10 ML: at 10:04

## 2020-12-28 RX ADMIN — METRONIDAZOLE 500 MG: 500 INJECTION, SOLUTION INTRAVENOUS at 10:54

## 2020-12-28 RX ADMIN — IPRATROPIUM BROMIDE AND ALBUTEROL SULFATE 1 AMPULE: .5; 3 SOLUTION RESPIRATORY (INHALATION) at 23:00

## 2020-12-28 RX ADMIN — DESMOPRESSIN ACETATE 0.52 MCG: 4 SOLUTION INTRAVENOUS at 22:47

## 2020-12-28 RX ADMIN — OCTREOTIDE ACETATE 50 MCG/HR: 500 INJECTION, SOLUTION INTRAVENOUS; SUBCUTANEOUS at 22:32

## 2020-12-28 RX ADMIN — SODIUM CHLORIDE: 9 INJECTION, SOLUTION INTRAVENOUS at 10:36

## 2020-12-28 RX ADMIN — METRONIDAZOLE 500 MG: 500 INJECTION, SOLUTION INTRAVENOUS at 02:55

## 2020-12-28 RX ADMIN — Medication 10 ML: at 21:05

## 2020-12-28 RX ADMIN — INSULIN LISPRO 2 UNITS: 100 INJECTION, SOLUTION INTRAVENOUS; SUBCUTANEOUS at 07:17

## 2020-12-28 RX ADMIN — DOPAMINE HYDROCHLORIDE 5 MCG/KG/MIN: 320 INJECTION, SOLUTION INTRAVENOUS at 08:17

## 2020-12-28 RX ADMIN — HYDROCORTISONE SODIUM SUCCINATE 50 MG: 100 INJECTION, POWDER, FOR SOLUTION INTRAMUSCULAR; INTRAVENOUS at 12:52

## 2020-12-28 RX ADMIN — METRONIDAZOLE 500 MG: 500 INJECTION, SOLUTION INTRAVENOUS at 18:44

## 2020-12-28 RX ADMIN — IPRATROPIUM BROMIDE AND ALBUTEROL SULFATE 1 AMPULE: .5; 3 SOLUTION RESPIRATORY (INHALATION) at 12:23

## 2020-12-28 ASSESSMENT — PAIN SCALES - GENERAL
PAINLEVEL_OUTOF10: 0
PAINLEVEL_OUTOF10: 0

## 2020-12-28 NOTE — PLAN OF CARE
Problem: Falls - Risk of:  Goal: Will remain free from falls  Description: Will remain free from falls  Outcome: Met This Shift     Problem: Falls - Risk of:  Goal: Absence of physical injury  Description: Absence of physical injury  Outcome: Met This Shift     Problem: Restraint Use - Nonviolent/Non-Self-Destructive Behavior:  Goal: Absence of restraint-related injury  Description: Absence of restraint-related injury  12/28/2020 0233 by Barbara Mast RN  Outcome: Met This Shift     Problem: Restraint Use - Nonviolent/Non-Self-Destructive Behavior:  Goal: Absence of restraint indications  Description: Absence of restraint indications  12/28/2020 0233 by Barbara aMst RN  Outcome: Not Met This Shift

## 2020-12-28 NOTE — PROGRESS NOTES
PROGRESS NOTE    By Reggie Mercado D.O GI Fellow    [facility]  Dr. Purnima Cox MD, Dr. Martha Rodríguez MD, Dr Johanna Louis, Dr. Rosa Ureña MD, Dr. Jose Luis Durant, DO Kirby Mo  63 y.o.  male    SUBJECTIVE:  Patient seen and evaluated in the ICU this AM.  Patient on Precedex along with BiPAP. Case discussed with nurse at the bedside no signs of GI bleed overnight. OBJECTIVE:    /69   Pulse 72   Temp 98.5 °F (36.9 °C) (Axillary)   Resp 17   Ht 5' 10\" (1.778 m)   Wt 208 lb 1.6 oz (94.4 kg)   SpO2 100%   BMI 29.86 kg/m²     Gen: NAD, BIPAP in place does open eyes to sternal rub   HEENT:PEERL, no icterus  Heart: RRR, no M/R/G  Lungs: CTAB  Abd.: soft, NT, ND, BS +, no G/R, no HSM  Extr.:  No lower extremity edema present patient with ecchymosis upper and lower extremities       Lab Results   Component Value Date    WBC 7.3 12/28/2020    WBC 15.5 12/27/2020    WBC 14.2 12/26/2020    HGB 6.2 12/28/2020    HGB 7.3 12/28/2020    HGB 7.6 12/27/2020    HCT 18.0 12/28/2020    MCV 82.6 12/28/2020    RDW 13.7 12/28/2020     12/28/2020     12/27/2020     12/26/2020     Lab Results   Component Value Date     12/28/2020    K 3.9 12/28/2020    K 4.4 12/27/2020    CL 81 12/28/2020    CO2 22 12/28/2020    BUN 44 12/28/2020    CREATININE 1.1 12/28/2020    CALCIUM 7.7 12/28/2020    PROT 5.1 12/28/2020    LABALBU 2.9 12/28/2020    BILITOT 1.9 12/28/2020    BILITOT 0.6 03/01/2016    BILITOT 0.9 11/12/2015    ALKPHOS 49 12/28/2020    ALKPHOS 95 03/01/2016    ALKPHOS 77 11/12/2015     12/28/2020    AST 21 03/01/2016    AST 96 11/12/2015     12/28/2020    ALT 25 03/01/2016     11/12/2015     Lab Results   Component Value Date    LIPASE 40 12/27/2020     Lab Results   Component Value Date    AMYLASE 205 12/27/2020         ASSESSMENT/PLAN:    1.   Acute on chronic microcytic anemia/possible coffee-ground emesis

## 2020-12-28 NOTE — PLAN OF CARE
Problem: Restraint Use - Nonviolent/Non-Self-Destructive Behavior:  Goal: Absence of restraint-related injury  Description: Absence of restraint-related injury  12/28/2020 0153 by Grisel Lomas RN  Outcome: Met This Shift     Problem: Restraint Use - Nonviolent/Non-Self-Destructive Behavior:  Goal: Absence of restraint indications  Description: Absence of restraint indications  12/28/2020 0153 by Grisel Lomas RN  Outcome: Not Met This Shift

## 2020-12-28 NOTE — PROGRESS NOTES
Physician Progress Note      [facility]  CSN #:                  [id number]  :                       1960  ADMIT DATE:       2020 12:35 AM  DISCH DATE:  RESPONDING  PROVIDER #:        Demarco Jaramillo DO          QUERY TEXT:    Dear Attending Provider,    Pt admitted with Severe hyponatremia. Pt noted to have needed BIPAP to   maintain oxygen saturations. . If possible, please document in the progress   notes and discharge summary if you are evaluating and/or treating any of the   following: The medical record reflects the following:  Risk Factors:Poss Pneumonia, Prior stroke, Alcohol abuse  Clinical Indicators: NN:pt is desaturing while sleeping. Pt is on room   air and while sleeping is 85-88%; : 86%, 87%, 84%, 90% on 50%FI02. Treatment: BiPAP mask, pulse ox monitoring, solu-cortef, duonebs and   monitoring. Thank you,  Vivi Rothman RN, BSN, CCDS  [phone]  Options provided:  -- Acute respiratory failure with hypoxia  -- Acute respiratory failure with hypercapnia  -- Other - I will add my own diagnosis  -- Disagree - Not applicable / Not valid  -- Disagree - Clinically unable to determine / Unknown  -- Refer to Clinical Documentation Reviewer    PROVIDER RESPONSE TEXT:    This patient is in acute respiratory failure with hypoxia. Query created by:  Joycelyn Ma on 2020 9:14 AM      Electronically signed by:  Demarco Jaramillo DO 2020 1:27 PM

## 2020-12-28 NOTE — PROGRESS NOTES
Pulmonary/Critical Care Progress Note    We are following patient for severe hyponatremia secondary to beer potomania, right lower lobe infiltrate, possible sepsis, diabetes mellitus type 2, hypertension, previous stroke with left hand weakness    SUBJECTIVE:  Patient spent an uneventful night on BiPAP. He is currently tolerating levofloxacin and metronidazole without difficulty. The patient remains afebrile. He is much more alert today. We can wean his BiPAP to a nasal cannula and he will hopefully improve from that point. He is also on pantoprazole twice daily for GI bleed possibility. Patient does have some RYLAN which is stable. Therefore, we will keep his Levaquin on a every 48 hour basis. The patient may benefit from a swallowing evaluation as well and we will initiate that today. IV steroids can be tapered.     MEDICATIONS:   sodium chloride  20 mL Intravenous Once    hydrocortisone sodium succinate PF  50 mg Intravenous Q6H    lidocaine PF  5 mL Intradermal Once    heparin flush  3 mL Intravenous 2 times per day    ipratropium-albuterol  1 ampule Inhalation Q4H    levofloxacin  500 mg Intravenous Q48H    metroNIDAZOLE  500 mg Intravenous Q8H    pantoprazole  40 mg Intravenous BID    insulin lispro  0-12 Units Subcutaneous Q6H    [Held by provider] aspirin  81 mg Oral Daily    atorvastatin  40 mg Oral Nightly    [Held by provider] clopidogrel  75 mg Oral Daily    thiamine mononitrate  100 mg Oral Daily    sodium chloride flush  10 mL Intravenous 2 times per day    [Held by provider] enoxaparin  40 mg Subcutaneous Daily      sodium chloride 100 mL/hr at 12/28/20 1036    octreotide (SANDOSTATIN) infusion 50 mcg/hr (12/28/20 1005)    dextrose      dexmedetomidine HCl in NaCl 0.5 mcg/kg/hr (12/28/20 1054)    DOPamine 2 mcg/kg/min (12/28/20 1303) sodium chloride flush, heparin flush, glucose, dextrose, glucagon (rDNA), dextrose, promethazine **OR** ondansetron, polyethylene glycol, acetaminophen **OR** acetaminophen, LORazepam **OR** LORazepam **OR** LORazepam **OR** LORazepam **OR** LORazepam **OR** LORazepam **OR** LORazepam **OR** LORazepam      REVIEW OF SYSTEMS:  Patient is sedated on Precedex for alcohol withdrawal and cannot answer any questions at this time     OBJECTIVE:  Vitals:    12/28/20 1400   BP: 121/66   Pulse: 60   Resp: 18   Temp:    SpO2: 99%     FiO2 : 60 %  O2 Flow Rate (L/min): 4 L/min  O2 Device: Nasal cannula    PHYSICAL EXAM:  Constitutional: No chills, fever, diaphoresis  Skin: Skin is, no skin breakdown  HEENT: Unremarkable  Neck: No JVD, lymphadenopathy, thyromegaly  Cardiovascular: S1, S2 normal.  No S3 murmurs rubs present  Respiratory: Inspiratory crackles right base. Few soft expiratory wheezes bilaterally  Gastrointestinal: Soft, obese, nontender  Genitourinary: No CVA tenderness  Extremities: Clubbing, cyanosis, or edema  Neurological: Opens eyes and nods his head occasionally.   Receiving lorazepam as needed as well as Precedex  Psychological: Difficult to evaluate    LABS:  WBC   Date Value Ref Range Status   12/28/2020 7.3 4.5 - 11.5 E9/L Final   12/27/2020 15.5 (H) 4.5 - 11.5 E9/L Final   12/26/2020 14.2 (H) 4.5 - 11.5 E9/L Final     Hemoglobin   Date Value Ref Range Status   12/28/2020 6.2 (L) 12.5 - 16.5 g/dL Final   12/28/2020 6.2 (L) 12.5 - 16.5 g/dL Final   12/28/2020 7.3 (L) 12.5 - 16.5 g/dL Final     Hematocrit   Date Value Ref Range Status   12/28/2020 18.2 (L) 37.0 - 54.0 % Final   12/28/2020 18.2 (L) 37.0 - 54.0 % Final   12/28/2020 18.0 (L) 37.0 - 54.0 % Final     MCV   Date Value Ref Range Status   12/28/2020 82.6 80.0 - 99.9 fL Final   12/27/2020 79.4 (L) 80.0 - 99.9 fL Final   12/26/2020 78.2 (L) 80.0 - 99.9 fL Final     Platelets   Date Value Ref Range Status   12/28/2020 108 (L) 130 - 450 E9/L Final 03/01/2016 0.6 0.0 - 1.2 mg/dL Final   11/12/2015 0.9 0.0 - 1.2 mg/dL Final     Alkaline Phosphatase   Date Value Ref Range Status   12/28/2020 49 40 - 129 U/L Final   03/01/2016 95 40 - 129 U/L Final   11/12/2015 77 40 - 129 U/L Final     AST   Date Value Ref Range Status   12/28/2020 442 (H) 0 - 39 U/L Final   03/01/2016 21 0 - 39 U/L Final   11/12/2015 96 (H) 0 - 39 U/L Final     ALT   Date Value Ref Range Status   12/28/2020 280 (H) 0 - 40 U/L Final   03/01/2016 25 0 - 40 U/L Final   11/12/2015 148 (H) 0 - 40 U/L Final     GFR Non-   Date Value Ref Range Status   12/28/2020 56 >=60 mL/min/1.73 Final     Comment:     Chronic Kidney Disease: less than 60 ml/min/1.73 sq.m. Kidney Failure: less than 15 ml/min/1.73 sq.m. Results valid for patients 18 years and older. 12/28/2020 51 >=60 mL/min/1.73 Final     Comment:     Chronic Kidney Disease: less than 60 ml/min/1.73 sq.m. Kidney Failure: less than 15 ml/min/1.73 sq.m. Results valid for patients 18 years and older. 12/28/2020 >60 >=60 mL/min/1.73 Final     Comment:     Chronic Kidney Disease: less than 60 ml/min/1.73 sq.m. Kidney Failure: less than 15 ml/min/1.73 sq.m. Results valid for patients 18 years and older. GFR    Date Value Ref Range Status   12/28/2020 >60  Final   12/28/2020 >60  Final   12/28/2020 >60  Final     Magnesium   Date Value Ref Range Status   12/28/2020 1.9 1.6 - 2.6 mg/dL Final   11/12/2015 2.1 1.6 - 2.6 mg/dL Final   11/11/2015 2.1 1.6 - 2.6 mg/dL Final     Phosphorus   Date Value Ref Range Status   12/28/2020 2.6 2.5 - 4.5 mg/dL Final   11/12/2015 3.7 2.5 - 4.5 mg/dL Final   11/11/2015 3.7 2.5 - 4.5 mg/dL Final     Recent Labs     12/27/20  0745   PH 7.383   PO2 54.8*   PCO2 33.3*   HCO3 19.4*   BE -5.0*   O2SAT 85.4*       RADIOLOGY:  XR CHEST PORTABLE   Final Result   1.  No interval change in the significant multifocal bilateral perihilar and lower lobe infiltrates. XR CHEST PORTABLE   Final Result   Bilateral pulmonary infiltrates no significant interval change      XR CHEST PORTABLE   Final Result   Left basilar pulmonary opacity which may represent atelectasis, pneumonia,   effusion or a combination thereof. XR SHOULDER LEFT (MIN 2 VIEWS)   Final Result   No acute fracture or dislocation in the left shoulder. XR CHEST (2 VW)   Final Result   No acute cardiopulmonary process. XR CHEST PORTABLE    (Results Pending)   US ABDOMEN LIMITED    (Results Pending)           PROBLEM LIST:  Principal Problem:    Hyponatremia  Active Problems:    CVA (cerebral vascular accident) ([address]    Diabetes mellitus ([address]  Resolved Problems:    * No resolved hospital problems. *      IMPRESSION/PLAN  1. Hyponatremia, slowly improving  2. Likely pneumonia receiving Levaquin and Flagyl as well as 1 dose of vancomycin  3. Elevated lactic acid level, improving  4. Prior stroke with residual left side weakness  5. Inhaled bronchodilators inhaled steroids  6. Decrease IV hydrocortisone  7. Thiamine  8. GI bleed prophylaxis  9. DVT prophylaxis  10. Follow renal indices        ATTESTATION:  ICU Staff Physician note of personal involvement in Care  As the attending physician, I certify that I personally reviewed the patients history and personally examined the patient to confirm the physical findings described above,  And that I reviewed the relevant imaging studies and available reports. I also discussed the differential diagnosis and all of the proposed management plans with the patient and individuals accompanying the patient to this visit. They had the opportunity to ask questions about the proposed management plans and to have those questions answered. This patient has a high probability of sudden, clinically significant deterioration, which requires the highest level of physician preparedness to intervene urgently. I managed/supervised life or organ supporting interventions that required frequent physician assessment. I devoted my full attention to the direct care of this patient for the amount of time indicated below. Time I spent with the family or surrogate(s) is included only if the patient was incapable of providing the necessary information or participating in medical decisions  Time devoted to teaching and to any procedures I billed separately is not included.     CRITICAL CARE TIME:  33 minutes    Electronically signed by Sophie Grace MD on 12/28/2020 at 2:32 PM

## 2020-12-28 NOTE — CARE COORDINATION
12/28/2020 Negative Covid (12/26/2020). Cm transition of care: pt continues care in the icu/bipap/precedex gtt. Call placed to pts mother Franky Francis at [phone]. Pt lives at home with his mother and brother. He has a cane- and uses this due to a stroke in 2015. Pt had a previous rehab stay at [address] [place] [address] and he has had no HHC, no oxygen at home. CM/SS will continue to follow.  Electronically signed by Elizabeth Knowles RN-BC on 12/28/2020 at 10:57 AM

## 2020-12-29 ENCOUNTER — APPOINTMENT (OUTPATIENT)
Dept: GENERAL RADIOLOGY | Age: 60
DRG: 871 | End: 2020-12-29
Payer: MEDICARE

## 2020-12-29 LAB
ALBUMIN SERPL-MCNC: 2.7 G/DL (ref 3.5–5.2)
ALP BLD-CCNC: 55 U/L (ref 40–129)
ALT SERPL-CCNC: 257 U/L (ref 0–40)
ANION GAP SERPL CALCULATED.3IONS-SCNC: 10 MMOL/L (ref 7–16)
ANION GAP SERPL CALCULATED.3IONS-SCNC: 7 MMOL/L (ref 7–16)
ANION GAP SERPL CALCULATED.3IONS-SCNC: 7 MMOL/L (ref 7–16)
ANION GAP SERPL CALCULATED.3IONS-SCNC: 8 MMOL/L (ref 7–16)
ANION GAP SERPL CALCULATED.3IONS-SCNC: 8 MMOL/L (ref 7–16)
ANION GAP SERPL CALCULATED.3IONS-SCNC: 9 MMOL/L (ref 7–16)
ANTI-NUCLEAR ANTIBODY (ANA): NEGATIVE
AST SERPL-CCNC: 294 U/L (ref 0–39)
B.E.: 0.7 MMOL/L (ref -3–3)
BASOPHILS ABSOLUTE: 0.01 E9/L (ref 0–0.2)
BASOPHILS RELATIVE PERCENT: 0.1 % (ref 0–2)
BILIRUB SERPL-MCNC: 1.4 MG/DL (ref 0–1.2)
BILIRUBIN DIRECT: 0.5 MG/DL (ref 0–0.3)
BILIRUBIN, INDIRECT: 0.9 MG/DL (ref 0–1)
BUN BLDV-MCNC: 29 MG/DL (ref 8–23)
BUN BLDV-MCNC: 33 MG/DL (ref 8–23)
BUN BLDV-MCNC: 35 MG/DL (ref 8–23)
BUN BLDV-MCNC: 39 MG/DL (ref 8–23)
BUN BLDV-MCNC: 40 MG/DL (ref 8–23)
BUN BLDV-MCNC: 40 MG/DL (ref 8–23)
CALCIUM IONIZED: 1.17 MMOL/L (ref 1.15–1.33)
CALCIUM SERPL-MCNC: 7.6 MG/DL (ref 8.6–10.2)
CALCIUM SERPL-MCNC: 7.7 MG/DL (ref 8.6–10.2)
CALCIUM SERPL-MCNC: 7.8 MG/DL (ref 8.6–10.2)
CHLORIDE BLD-SCNC: 87 MMOL/L (ref 98–107)
CHLORIDE BLD-SCNC: 88 MMOL/L (ref 98–107)
CHLORIDE BLD-SCNC: 90 MMOL/L (ref 98–107)
CHLORIDE BLD-SCNC: 90 MMOL/L (ref 98–107)
CO2: 22 MMOL/L (ref 22–29)
CO2: 23 MMOL/L (ref 22–29)
CO2: 24 MMOL/L (ref 22–29)
CO2: 24 MMOL/L (ref 22–29)
COHB: 0.3 % (ref 0–1.5)
CREAT SERPL-MCNC: 0.9 MG/DL (ref 0.7–1.2)
CREAT SERPL-MCNC: 0.9 MG/DL (ref 0.7–1.2)
CREAT SERPL-MCNC: 1 MG/DL (ref 0.7–1.2)
CREAT SERPL-MCNC: 1.1 MG/DL (ref 0.7–1.2)
CRITICAL: ABNORMAL
DATE ANALYZED: ABNORMAL
DATE OF COLLECTION: ABNORMAL
EOSINOPHILS ABSOLUTE: 0.01 E9/L (ref 0.05–0.5)
EOSINOPHILS RELATIVE PERCENT: 0.1 % (ref 0–6)
GFR AFRICAN AMERICAN: >60
GFR NON-AFRICAN AMERICAN: >60 ML/MIN/1.73
GLUCOSE BLD-MCNC: 205 MG/DL (ref 74–99)
GLUCOSE BLD-MCNC: 216 MG/DL (ref 74–99)
GLUCOSE BLD-MCNC: 218 MG/DL (ref 74–99)
GLUCOSE BLD-MCNC: 220 MG/DL (ref 74–99)
GLUCOSE BLD-MCNC: 222 MG/DL (ref 74–99)
GLUCOSE BLD-MCNC: 228 MG/DL (ref 74–99)
HAV IGM SER IA-ACNC: NORMAL
HCO3: 23.9 MMOL/L (ref 22–26)
HCT VFR BLD CALC: 20.6 % (ref 37–54)
HCT VFR BLD CALC: 21 % (ref 37–54)
HCT VFR BLD CALC: 21.2 % (ref 37–54)
HCT VFR BLD CALC: 21.3 % (ref 37–54)
HCT VFR BLD CALC: 21.8 % (ref 37–54)
HEMOGLOBIN: 7.3 G/DL (ref 12.5–16.5)
HEMOGLOBIN: 7.3 G/DL (ref 12.5–16.5)
HEMOGLOBIN: 7.4 G/DL (ref 12.5–16.5)
HEMOGLOBIN: 7.5 G/DL (ref 12.5–16.5)
HEMOGLOBIN: 7.7 G/DL (ref 12.5–16.5)
HEPATITIS B CORE IGM ANTIBODY: NORMAL
HEPATITIS B SURFACE ANTIGEN INTERPRETATION: NORMAL
HEPATITIS C ANTIBODY INTERPRETATION: NORMAL
HHB: 4.8 % (ref 0–5)
IMMATURE GRANULOCYTES #: 0.05 E9/L
IMMATURE GRANULOCYTES %: 0.7 % (ref 0–5)
INR BLD: 1.8
LAB: ABNORMAL
LACTIC ACID: 2.2 MMOL/L (ref 0.5–2.2)
LYMPHOCYTES ABSOLUTE: 0.8 E9/L (ref 1.5–4)
LYMPHOCYTES RELATIVE PERCENT: 11.1 % (ref 20–42)
Lab: ABNORMAL
MCH RBC QN AUTO: 29.3 PG (ref 26–35)
MCHC RBC AUTO-ENTMCNC: 35.4 % (ref 32–34.5)
MCV RBC AUTO: 82.7 FL (ref 80–99.9)
METER GLUCOSE: 191 MG/DL (ref 74–99)
METER GLUCOSE: 206 MG/DL (ref 74–99)
METER GLUCOSE: 239 MG/DL (ref 74–99)
METER GLUCOSE: 247 MG/DL (ref 74–99)
METER GLUCOSE: 272 MG/DL (ref 74–99)
METHB: 0.3 % (ref 0–1.5)
MODE: ABNORMAL
MONOCYTES ABSOLUTE: 0.55 E9/L (ref 0.1–0.95)
MONOCYTES RELATIVE PERCENT: 7.6 % (ref 2–12)
NEUTROPHILS ABSOLUTE: 5.77 E9/L (ref 1.8–7.3)
NEUTROPHILS RELATIVE PERCENT: 80.4 % (ref 43–80)
O2 CONTENT: 11.6 ML/DL
O2 SATURATION: 95.2 % (ref 92–98.5)
O2HB: 94.6 % (ref 94–97)
OPERATOR ID: 7490
OSMOLALITY URINE: 401 MOSM/KG (ref 300–900)
OSMOLALITY URINE: 483 MOSM/KG (ref 300–900)
OSMOLALITY URINE: 484 MOSM/KG (ref 300–900)
OSMOLALITY URINE: 495 MOSM/KG (ref 300–900)
OSMOLALITY URINE: 526 MOSM/KG (ref 300–900)
OSMOLALITY URINE: 555 MOSM/KG (ref 300–900)
PATIENT TEMP: 37 C
PCO2: 32.4 MMHG (ref 35–45)
PDW BLD-RTO: 14.2 FL (ref 11.5–15)
PH BLOOD GAS: 7.49 (ref 7.35–7.45)
PLATELET # BLD: 127 E9/L (ref 130–450)
PMV BLD AUTO: 11.2 FL (ref 7–12)
PO2: 77.5 MMHG (ref 75–100)
POTASSIUM REFLEX MAGNESIUM: 3.6 MMOL/L (ref 3.5–5)
POTASSIUM REFLEX MAGNESIUM: 3.7 MMOL/L (ref 3.5–5)
POTASSIUM REFLEX MAGNESIUM: 3.7 MMOL/L (ref 3.5–5)
POTASSIUM REFLEX MAGNESIUM: 3.8 MMOL/L (ref 3.5–5)
POTASSIUM REFLEX MAGNESIUM: 4.1 MMOL/L (ref 3.5–5)
POTASSIUM SERPL-SCNC: 4.2 MMOL/L (ref 3.5–5)
PROTHROMBIN TIME: 20.9 SEC (ref 9.3–12.4)
RBC # BLD: 2.49 E12/L (ref 3.8–5.8)
SODIUM BLD-SCNC: 119 MMOL/L (ref 132–146)
SODIUM BLD-SCNC: 120 MMOL/L (ref 132–146)
SOURCE, BLOOD GAS: ABNORMAL
THB: 8.6 G/DL (ref 11.5–16.5)
TIME ANALYZED: 759
TOTAL PROTEIN: 5 G/DL (ref 6.4–8.3)
WBC # BLD: 7.2 E9/L (ref 4.5–11.5)

## 2020-12-29 PROCEDURE — 6370000000 HC RX 637 (ALT 250 FOR IP): Performed by: INTERNAL MEDICINE

## 2020-12-29 PROCEDURE — 2700000000 HC OXYGEN THERAPY PER DAY

## 2020-12-29 PROCEDURE — 85014 HEMATOCRIT: CPT

## 2020-12-29 PROCEDURE — 92610 EVALUATE SWALLOWING FUNCTION: CPT | Performed by: SPEECH-LANGUAGE PATHOLOGIST

## 2020-12-29 PROCEDURE — 6360000002 HC RX W HCPCS: Performed by: INTERNAL MEDICINE

## 2020-12-29 PROCEDURE — C9113 INJ PANTOPRAZOLE SODIUM, VIA: HCPCS | Performed by: INTERNAL MEDICINE

## 2020-12-29 PROCEDURE — 82962 GLUCOSE BLOOD TEST: CPT

## 2020-12-29 PROCEDURE — 94660 CPAP INITIATION&MGMT: CPT

## 2020-12-29 PROCEDURE — 94640 AIRWAY INHALATION TREATMENT: CPT

## 2020-12-29 PROCEDURE — 83935 ASSAY OF URINE OSMOLALITY: CPT

## 2020-12-29 PROCEDURE — 82330 ASSAY OF CALCIUM: CPT

## 2020-12-29 PROCEDURE — 97161 PT EVAL LOW COMPLEX 20 MIN: CPT | Performed by: PHYSICAL THERAPIST

## 2020-12-29 PROCEDURE — 36600 WITHDRAWAL OF ARTERIAL BLOOD: CPT

## 2020-12-29 PROCEDURE — 80048 BASIC METABOLIC PNL TOTAL CA: CPT

## 2020-12-29 PROCEDURE — 97112 NEUROMUSCULAR REEDUCATION: CPT | Performed by: PHYSICAL THERAPIST

## 2020-12-29 PROCEDURE — 2000000000 HC ICU R&B

## 2020-12-29 PROCEDURE — 99291 CRITICAL CARE FIRST HOUR: CPT | Performed by: INTERNAL MEDICINE

## 2020-12-29 PROCEDURE — 36415 COLL VENOUS BLD VENIPUNCTURE: CPT

## 2020-12-29 PROCEDURE — 85025 COMPLETE CBC W/AUTO DIFF WBC: CPT

## 2020-12-29 PROCEDURE — 82805 BLOOD GASES W/O2 SATURATION: CPT

## 2020-12-29 PROCEDURE — 2500000003 HC RX 250 WO HCPCS: Performed by: INTERNAL MEDICINE

## 2020-12-29 PROCEDURE — 71045 X-RAY EXAM CHEST 1 VIEW: CPT

## 2020-12-29 PROCEDURE — 97530 THERAPEUTIC ACTIVITIES: CPT | Performed by: PHYSICAL THERAPIST

## 2020-12-29 PROCEDURE — 85610 PROTHROMBIN TIME: CPT

## 2020-12-29 PROCEDURE — 80053 COMPREHEN METABOLIC PANEL: CPT

## 2020-12-29 PROCEDURE — 2580000003 HC RX 258: Performed by: INTERNAL MEDICINE

## 2020-12-29 PROCEDURE — 83605 ASSAY OF LACTIC ACID: CPT

## 2020-12-29 PROCEDURE — 82248 BILIRUBIN DIRECT: CPT

## 2020-12-29 PROCEDURE — 85018 HEMOGLOBIN: CPT

## 2020-12-29 RX ORDER — ERGOCALCIFEROL 1.25 MG/1
50000 CAPSULE ORAL WEEKLY
Status: DISCONTINUED | OUTPATIENT
Start: 2020-12-29 | End: 2021-01-05 | Stop reason: HOSPADM

## 2020-12-29 RX ORDER — INSULIN GLARGINE 100 [IU]/ML
0.25 INJECTION, SOLUTION SUBCUTANEOUS NIGHTLY
Status: DISCONTINUED | OUTPATIENT
Start: 2020-12-29 | End: 2021-01-05 | Stop reason: HOSPADM

## 2020-12-29 RX ADMIN — METRONIDAZOLE 500 MG: 500 INJECTION, SOLUTION INTRAVENOUS at 02:56

## 2020-12-29 RX ADMIN — PANTOPRAZOLE SODIUM 40 MG: 40 INJECTION, POWDER, LYOPHILIZED, FOR SOLUTION INTRAVENOUS at 20:22

## 2020-12-29 RX ADMIN — IPRATROPIUM BROMIDE AND ALBUTEROL SULFATE 1 AMPULE: .5; 3 SOLUTION RESPIRATORY (INHALATION) at 22:57

## 2020-12-29 RX ADMIN — INSULIN LISPRO 6 UNITS: 100 INJECTION, SOLUTION INTRAVENOUS; SUBCUTANEOUS at 00:18

## 2020-12-29 RX ADMIN — INSULIN GLARGINE 24 UNITS: 100 INJECTION, SOLUTION SUBCUTANEOUS at 21:52

## 2020-12-29 RX ADMIN — ERGOCALCIFEROL 50000 UNITS: 1.25 CAPSULE ORAL at 16:05

## 2020-12-29 RX ADMIN — OCTREOTIDE ACETATE 50 MCG/HR: 500 INJECTION, SOLUTION INTRAVENOUS; SUBCUTANEOUS at 18:02

## 2020-12-29 RX ADMIN — IPRATROPIUM BROMIDE AND ALBUTEROL SULFATE 1 AMPULE: .5; 3 SOLUTION RESPIRATORY (INHALATION) at 19:26

## 2020-12-29 RX ADMIN — HYDROCORTISONE SODIUM SUCCINATE 50 MG: 100 INJECTION, POWDER, FOR SOLUTION INTRAMUSCULAR; INTRAVENOUS at 06:11

## 2020-12-29 RX ADMIN — IPRATROPIUM BROMIDE AND ALBUTEROL SULFATE 1 AMPULE: .5; 3 SOLUTION RESPIRATORY (INHALATION) at 07:22

## 2020-12-29 RX ADMIN — INSULIN LISPRO 2 UNITS: 100 INJECTION, SOLUTION INTRAVENOUS; SUBCUTANEOUS at 18:08

## 2020-12-29 RX ADMIN — IPRATROPIUM BROMIDE AND ALBUTEROL SULFATE 1 AMPULE: .5; 3 SOLUTION RESPIRATORY (INHALATION) at 15:31

## 2020-12-29 RX ADMIN — ATORVASTATIN CALCIUM 40 MG: 20 TABLET, FILM COATED ORAL at 20:22

## 2020-12-29 RX ADMIN — THIAMINE HCL TAB 100 MG 100 MG: 100 TAB at 08:57

## 2020-12-29 RX ADMIN — Medication 10 ML: at 21:42

## 2020-12-29 RX ADMIN — DEXTROSE MONOHYDRATE: 50 INJECTION, SOLUTION INTRAVENOUS at 18:02

## 2020-12-29 RX ADMIN — INSULIN LISPRO 4 UNITS: 100 INJECTION, SOLUTION INTRAVENOUS; SUBCUTANEOUS at 22:53

## 2020-12-29 RX ADMIN — OCTREOTIDE ACETATE 50 MCG/HR: 500 INJECTION, SOLUTION INTRAVENOUS; SUBCUTANEOUS at 08:19

## 2020-12-29 RX ADMIN — HYDROCORTISONE SODIUM SUCCINATE 50 MG: 100 INJECTION, POWDER, FOR SOLUTION INTRAMUSCULAR; INTRAVENOUS at 12:59

## 2020-12-29 RX ADMIN — INSULIN LISPRO 6 UNITS: 100 INJECTION, SOLUTION INTRAVENOUS; SUBCUTANEOUS at 12:58

## 2020-12-29 RX ADMIN — METRONIDAZOLE 500 MG: 500 INJECTION, SOLUTION INTRAVENOUS at 18:23

## 2020-12-29 RX ADMIN — IPRATROPIUM BROMIDE AND ALBUTEROL SULFATE 1 AMPULE: .5; 3 SOLUTION RESPIRATORY (INHALATION) at 02:04

## 2020-12-29 RX ADMIN — Medication 10 ML: at 08:57

## 2020-12-29 RX ADMIN — PANTOPRAZOLE SODIUM 40 MG: 40 INJECTION, POWDER, LYOPHILIZED, FOR SOLUTION INTRAVENOUS at 08:57

## 2020-12-29 RX ADMIN — METRONIDAZOLE 500 MG: 500 INJECTION, SOLUTION INTRAVENOUS at 11:19

## 2020-12-29 RX ADMIN — LEVOFLOXACIN 500 MG: 5 INJECTION, SOLUTION INTRAVENOUS at 11:19

## 2020-12-29 RX ADMIN — IPRATROPIUM BROMIDE AND ALBUTEROL SULFATE 1 AMPULE: .5; 3 SOLUTION RESPIRATORY (INHALATION) at 10:09

## 2020-12-29 ASSESSMENT — PAIN SCALES - GENERAL
PAINLEVEL_OUTOF10: 0

## 2020-12-29 NOTE — PROGRESS NOTES
SPEECH/LANGUAGE PATHOLOGY  CLINICAL ASSESSMENT OF SWALLOWING FUNCTION    PATIENT NAME:  Ruslan Schneider      :  1960      TODAY'S DATE:  2020  ROOM:  0540/0540-01    SUMMARY OF EVALUATION    RN cleared Pt for participation in assessment?: yes     DYSPHAGIA DIAGNOSIS:  Clinical indicators consistent with mild oropharyngeal dysphagia       DIET RECOMMENDATIONS:  Dysphagia 3, Soft/advanced (Soft & Bite-sized) solids with  thin liquids          FEEDING RECOMMENDATIONS:     Assistance level:  Set-up is required for all oral intake      Compensatory strategies recommended: Double swallow, Small bites/sips, Alternate solids and liquids and Throat clear    THERAPY RECOMMENDATIONS:      Dysphagia therapy is recommended 3-5 times per week for LOS or when goals are met.      Ongoing meal endurance analysis to provide diet modification and compensatory strategy implementation to minimize risk of aspiration associated with PO intake  Instruction regarding appropriate implementation of compensatory strategies to improve integrity of swallow function during PO intake   Meal endurance analysis 1-2 sessions to provide diet modification and compensatory strategy implementation due to need to ensure proper implementation of compensatory strategies during PO intake        PAST HISTORY OF DYSPHAGIA?: yes--after CVA several years ago  Diet PRIOR to hospital admission:    Regular consistency solids with  thin liquids   COGNITION: Alert & Oriented x 2 and Follows 2 - step directions appropriate for this assessment                PROCEDURE     Oral Peripheral Examination   Generalized oral weakness    Current Respiratory Status   nasal canula    Parameters of Speech Production  Respiration:  Adequate for speech production  Quality:   Within functional limits  Intensity: Within functional limits    Volitional Swallow: Present  Volitional Cough:    Present    Consistencies Administered During the Evaluation Liquids: thin liquid and pudding thick liquid   Solids:  pureed foods and soft solid foods      Method of Intake:   cup, spoon  Self fed, Fed by clinician      Position:   Seated, upright                  RESULTS     Oral Stage: The oral stage of swallowing was within functional limits and missing teeth      Pharyngeal Stage:      No signs of aspiration were noted during this evaluation however, silent aspiration cannot be ruled out at bedside. If silent aspiration is suspected, a Videofluoroscopic Study of Swallowing (MBS) is recommended and requires a physician order. The Speech Language Pathologist (SLP) completed education with the patient regarding results of evaluation. Explained that Speech Pathology intervention is warranted  at this time   Prognosis for improvements is good     This plan will be re-evaluated and revised in 1 week  if warranted. Patient stated goals: Agreed with above,   Treatment goals discussed with Patient   The Patient understand(s) the diagnosis, prognosis and plan of care       CPT code:  73620  bedside swallow eval      [x]The admitting diagnosis and active problem list, as listed below have been reviewed prior to initiation of this evaluation.      ADMITTING DIAGNOSIS: Hyponatremia [E87.1]     ACTIVE PROBLEM LIST:   Patient Active Problem List   Diagnosis    [facility]    Diabetes mellitus ([facility]    Hyponatremia       Kaitlynn De Oliveira MSCCC/SLP  Speech Language Pathologist  [id number]

## 2020-12-29 NOTE — PROGRESS NOTES
Department of Internal Medicine  General Internal Medicine  Attending Progress Note      SUBJECTIVE:    Reports that he is feeling better today. Noted that he is hungry and will like to eat. Denied fever and chills. Asked what is he cause of his ailment.      OBJECTIVE      Medications    Current Facility-Administered Medications: 0.9 % sodium chloride bolus, 20 mL, Intravenous, Once  octreotide (SANDOSTATIN) 500 mcg in sodium chloride 0.9 % 100 mL infusion, 50 mcg/hr, Intravenous, Continuous  lidocaine PF 1 % injection 5 mL, 5 mL, Intradermal, Once  sodium chloride flush 0.9 % injection 10 mL, 10 mL, Intravenous, PRN  heparin flush 100 UNIT/ML injection 300 Units, 3 mL, Intravenous, 2 times per day  heparin flush 100 UNIT/ML injection 300 Units, 3 mL, Intracatheter, PRN  hydrocortisone sodium succinate PF (SOLU-CORTEF) injection 50 mg, 50 mg, Intravenous, Q8H  dextrose 5 % solution, , Intravenous, Continuous  ipratropium-albuterol (DUONEB) nebulizer solution 1 ampule, 1 ampule, Inhalation, Q4H  levoFLOXacin (LEVAQUIN) 500 MG/100ML infusion 500 mg, 500 mg, Intravenous, Q48H  metronidazole (FLAGYL) 500 mg in NaCl 100 mL IVPB premix, 500 mg, Intravenous, Q8H  glucose (GLUTOSE) 40 % oral gel 15 g, 15 g, Oral, PRN  dextrose 50 % IV solution, 12.5 g, Intravenous, PRN  glucagon (rDNA) injection 1 mg, 1 mg, Intramuscular, PRN  dextrose 5 % solution, 100 mL/hr, Intravenous, PRN  dexmedetomidine (PRECEDEX) 400 mcg in sodium chloride 0.9 % 100 mL infusion, 0.2 mcg/kg/hr, Intravenous, Continuous  pantoprazole (PROTONIX) injection 40 mg, 40 mg, Intravenous, BID  insulin lispro (HUMALOG) injection vial 0-12 Units, 0-12 Units, Subcutaneous, Q6H  DOPamine (INTROPIN) 800 mg in dextrose 5 % 250 mL infusion, 3 mcg/kg/min, Intravenous, Continuous  [Held by provider] aspirin EC tablet 81 mg, 81 mg, Oral, Daily  atorvastatin (LIPITOR) tablet 40 mg, 40 mg, Oral, Nightly [Held by provider] clopidogrel (PLAVIX) tablet 75 mg, 75 mg, Oral, Daily  thiamine mononitrate tablet 100 mg, 100 mg, Oral, Daily  sodium chloride flush 0.9 % injection 10 mL, 10 mL, Intravenous, 2 times per day  [Held by provider] enoxaparin (LOVENOX) injection 40 mg, 40 mg, Subcutaneous, Daily  promethazine (PHENERGAN) tablet 12.5 mg, 12.5 mg, Oral, Q6H PRN **OR** ondansetron (ZOFRAN) injection 4 mg, 4 mg, Intravenous, Q6H PRN  polyethylene glycol (GLYCOLAX) packet 17 g, 17 g, Oral, Daily PRN  acetaminophen (TYLENOL) tablet 650 mg, 650 mg, Oral, Q6H PRN **OR** acetaminophen (TYLENOL) suppository 650 mg, 650 mg, Rectal, Q6H PRN  LORazepam (ATIVAN) tablet 1 mg, 1 mg, Oral, Q1H PRN **OR** LORazepam (ATIVAN) injection 1 mg, 1 mg, Intravenous, Q1H PRN **OR** LORazepam (ATIVAN) tablet 2 mg, 2 mg, Oral, Q1H PRN **OR** LORazepam (ATIVAN) injection 2 mg, 2 mg, Intravenous, Q1H PRN **OR** LORazepam (ATIVAN) tablet 3 mg, 3 mg, Oral, Q1H PRN **OR** LORazepam (ATIVAN) injection 3 mg, 3 mg, Intravenous, Q1H PRN **OR** LORazepam (ATIVAN) tablet 4 mg, 4 mg, Oral, Q1H PRN **OR** LORazepam (ATIVAN) injection 4 mg, 4 mg, Intravenous, Q1H PRN  Physical    VITALS:  BP (!) 110/57   Pulse 62   Temp 98.6 °F (37 °C) (Infrared)   Resp 20   Ht 5' 10\" (1.778 m)   Wt 208 lb 1.6 oz (94.4 kg)   SpO2 100%   BMI 29.86 kg/m²   CONSTITUTIONAL:  awake, alert, cooperative, no apparent distress, and appears stated age  EYES:  Lids and lashes normal, pupils equal, round and reactive to light, extra ocular muscles intact, sclera clear, conjunctiva normal  ENT:  normocepalic, without obvious abnormality  NECK:  supple, symmetrical, trachea midline  HEMATOLOGIC/LYMPHATICS:  no cervical lymphadenopathy  LUNGS:  No increased work of breathing, good air exchange, clear to auscultation bilaterally, no crackles or wheezing  CARDIOVASCULAR:  normal apical pulses ABDOMEN:  No scars, normal bowel sounds, soft, non-distended, non-tender, no masses palpated, no hepatosplenomegally  MUSCULOSKELETAL:  there is no redness, warmth, or swelling of the joints  NEUROLOGIC:  Awake, alert, oriented to name, place and time. Cranial nerves II-XII are grossly intact. Motor is 5 out of 5 bilaterally. Cerebellar finger to nose, heel to shin intact. Sensory is intact. Babinski down going, Romberg negative, and gait is normal.  SKIN:  no bruising or bleeding  Data    CBC with Differential:    Lab Results   Component Value Date    WBC 7.2 12/29/2020    RBC 2.49 12/29/2020    HGB 7.3 12/29/2020    HCT 20.6 12/29/2020     12/29/2020    MCV 82.7 12/29/2020    MCH 29.3 12/29/2020    MCHC 35.4 12/29/2020    RDW 14.2 12/29/2020    NRBC 0.9 12/28/2020    LYMPHOPCT 11.1 12/29/2020    MONOPCT 7.6 12/29/2020    BASOPCT 0.1 12/29/2020    MONOSABS 0.55 12/29/2020    LYMPHSABS 0.80 12/29/2020    EOSABS 0.01 12/29/2020    BASOSABS 0.01 12/29/2020     BMP:    Lab Results   Component Value Date     12/29/2020    K 4.2 12/29/2020    K 3.8 12/29/2020    CL 88 12/29/2020    CO2 24 12/29/2020    BUN 40 12/29/2020    LABALBU 2.7 12/29/2020    CREATININE 1.0 12/29/2020    CALCIUM 7.6 12/29/2020    GFRAA >60 12/29/2020    LABGLOM >60 12/29/2020    GLUCOSE 218 12/29/2020       ASSESSMENT AND PLAN        1. Hyponatremia:  Improving  Continue to monitor  Continue IV fluid. May add na tablet. Overall Na is much better than admission. Appreciate nephro input    2. Hx of  CVA (cerebral vascular accident) ([facility] with left side residual weakness:  Continue home medication  Will continue to monitor  PT/OT  ST before resuming diet    3. Diabetes mellitus (HCC)  BGL is elevated. Continue sliding scale  May need to resume metformin soon. 4. Hx of etoh Abuse: continue to monitor  Counseled on importance of cessation    5. Acute Respiratory Failure with Hypoxia: much improved  Off Bipap. Saturating well    6. Anemia: unsure source of anemia; Gi is following  Will appreciate further recommendation    7. Hypotension: on Dopamin drip  Treated with Solu-Cortef  Will continue to monitor    Levaquin and Flagyl for ? Infectious process.

## 2020-12-29 NOTE — PROGRESS NOTES
PROGRESS NOTE    By Louie Stratton D.O GI Fellow    [facility]  Dr. Javon Cook MD, Dr. Merissa Salazar MD, Dr Rory Jerry, Dr. Annabella Escalante MD, Dr. Fabrice Rangel, DO      [facility]  63 y.o.  male    SUBJECTIVE:  Patient alert and oriented x3 this AM.  He denies any hematemesis or melena      OBJECTIVE:    BP (!) 110/57   Pulse 62   Temp 98.6 °F (37 °C) (Infrared)   Resp 20   Ht 5' 10\" (1.778 m)   Wt 208 lb 1.6 oz (94.4 kg)   SpO2 100%   BMI 29.86 kg/m²     Gen: NAD, alert oriented x3  HEENT:PEERL, no icterus  Heart: RRR, no M/R/G  Lungs: CTAB  Abd.: soft, NT, ND, BS +, no G/R, no HSM  Extr.:  No lower extremity edema present patient with ecchymosis upper and lower extremities       Lab Results   Component Value Date    WBC 7.2 12/29/2020    WBC 7.3 12/28/2020    WBC 15.5 12/27/2020    HGB 7.3 12/29/2020    HGB 7.3 12/29/2020    HGB 7.5 12/28/2020    HCT 20.6 12/29/2020    MCV 82.7 12/29/2020    RDW 14.2 12/29/2020     12/29/2020     12/28/2020     12/27/2020     Lab Results   Component Value Date     12/29/2020    K 4.2 12/29/2020    K 3.8 12/29/2020    CL 88 12/29/2020    CO2 24 12/29/2020    BUN 40 12/29/2020    CREATININE 1.0 12/29/2020    CALCIUM 7.6 12/29/2020    PROT 5.0 12/29/2020    LABALBU 2.7 12/29/2020    BILITOT 1.4 12/29/2020    BILITOT 1.9 12/28/2020    BILITOT 0.6 03/01/2016    ALKPHOS 55 12/29/2020    ALKPHOS 49 12/28/2020    ALKPHOS 95 03/01/2016     12/29/2020     12/28/2020    AST 21 03/01/2016     12/29/2020     12/28/2020    ALT 25 03/01/2016     Lab Results   Component Value Date    LIPASE 40 12/27/2020     Lab Results   Component Value Date    AMYLASE 205 12/27/2020         ASSESSMENT/PLAN:    1.   Acute on chronic microcytic anemia/possible coffee-ground emesis  - No overt hematemesis, on dopamine per critical care - Hgb 6.2 this to be transfused 1 unit of PRBC/ Type and Cross 2 units on hold/q 8 Hgb transfuse per admitting   - Continue to hold Plavix and Aspirin if feasible per admitting    - Continue with Protonix IV BID, continue with octreotide 50 MCG's per hour   -Patient high risk for endoscopic evaluation with hyponatremia    -Hold off on endoscopic evaluation at this time, with patient with no overt signs of GI bleeding  -Continue supportive care      2. Elevated Liver transaminases   -Possibly secondary to patient's hypotension ischemic hepatitis  - R Factor 17.1 indicating hepatocellular injury  - Will obtain acute hepatitis panel, MERLIN,  And RUQ US to evaluate   - Montior with daily CMP   - Maintain adequate maps greater than 65    3. Severe Hyponatremia- improving   - Na 102 on admission   - Per nephrology         Patient with no overt signs of GI bleed on exam.  Patient's hemoglobin health study yesterday 7.6-->7.5-->7.3-->7.3 this am.  Continue with Protonix and octreotide infusions. No plans for acute endoscopic evaluation with no overt signs of GI bleed patient being hyponatremic. Continue with supportive care          Pt was discussed with Dr. Norberto Harkins DO  GI Fellow   12/29/2020  9:54 AM      Pt seen and independently examined. Pertinent notes and lab work reviewed. Monitor reviewed showing NSR. D/w Dr. Jacqui Hernandez with physical exam and A&P. Discussed with patient - all questions answered - agreeable with the plan as delineated.     Bartolome Meyers MD  12/29/2020  10:47 AM

## 2020-12-29 NOTE — PROGRESS NOTES
AM-PAC Inpatient T-Scale Score : 32.29  Mobility Inpatient CMS 0-100% Score: 76.75  Mobility Inpatient CMS G-Code Modifier : CL    Nursing cleared patient for PT evaluation. The admitting diagnosis and active problem list as listed above have been reviewed prior to the initiation of this evaluation. OBJECTIVE;   Initial Evaluation  Date: 12/29/20 Treatment Date:     Short Term/ Long Term   Goals   Was pt agreeable to Eval/treatment? Yes    To be met in 3 days   Pain level   8/10  left groin      Bed Mobility    Rolling: Moderate assist of 1    Supine to sit: Moderate assist of 1    Sit to supine: Moderate assist of 1    Scooting:  Moderate assist of 1    Rolling: Supervision     Supine to sit: Supervision     Sit to supine: Supervision     Scooting: Supervision      Transfers Sit to stand: Maximal assist of 1    Sit to stand: Supervision      Ambulation    not assessed      50 feet using  cane with Supervision     Stair negotiation: ascended and descended   Not assessed       3 steps with rail supervision    ROM Within functional limits with exception of right shoulder     Increase range of motion 10% of affected joints    Strength LUE:  1/5  R UE:  3/5  RLE:  3/5  LLE:  2/5   Increase strength in affected mm groups by 1/3 grade   Balance Sitting EOB:  fair    Dynamic Standing:  poor    Sitting EOB:  good    Dynamic Standing: fair       Patient is Alert & Oriented x person, place and situation and follows one step directions    Sensation:  Patient  reports numbness and tingling left lower extremity    Edema:  yes left upper extremity    Endurance: poor      Vitals: 2 Liters of o2 via nasal cannula   Blood Pressure at rest 117/63 Blood Pressure during session 109/65   Heart Rate at rest 69 Heart Rate during session 73   SPO2 at rest 100%  SPO2 during session 99%     Patient education Patient educated on role of Physical Therapy, risks of immobility, safety and plan of care, energy conservation and  importance of mobility while in hospital       Patient response to education:   Pt verbalized understanding Pt demonstrated skill Pt requires further education in this area   Yes Partial Yes      Treatment:  Patient practiced and was instructed/facilitated in the following treatment: Patient   Sat edge of bed 15 minutes with Supervision  to increase dynamic sitting balance and activity tolerance. seated and standing challenges for strength and endurance/balance     Therapeutic Exercises:  ankle pumps, heel slide, straight leg raise and hip abduction  x 10 reps. At end of session, patient in bed with alarm call light and phone within reach,   all lines and tubes intact, nursing notified. Patient would benefit from continued skilled Physical Therapy to improve functional independence and quality of life. Patient's/ family goals   rehab        ASSESSMENT: Patient exhibits decreased strength, balance, coordination impairing functional mobility. Decreased motor control on Left lower extremity , swelling Left upper extremitiy  And overall endurance deficit are barriers to d/c and require skilled intervention during hospital stay and  impacts safe mobility and  patient requires mod/max  assist with mobility, continues to require skilled intervention to progress activity to monitor vital signs and response to activity.        Plan of Care:     -Bed Mobility: Lower extremity exercises , Upper extremity exercises  and Trunk control activities   -Sitting Balance: Incorporate reaching activities to activate trunk muscles   -Standing Balance: Perform strengthening exercises in standing to promote motor control with or without upper extremity support  and Instruct patient on adequate base of support to maintain balance -Transfers: Provide instruction on proper hand and foot position for adequate transfer of weight onto lower extremities and use of gait device, Cues for hand placement, technique and safety, Facilitate weight shift forward on to lower extremities and provide necessary stabilization of bilateral lower extremities , Support transfer of weight on to lower extremities and Provide stabilization to prevent fall   -Gait: Gait training, Standing activities to improve: base of support, weight shift, weight bearing  and Exercises to improve hip and knee control   -Endurance: Utilize Supervised activities to increase level of endurance to allow for safe functional mobility including transfers and gait     Patient and or family understand(s) diagnosis, prognosis, and plan of care. Frequency of treatments: Patient will be seen  3-5 /week . Time in  210  Time out  301    Total Treatment Time  25 minutes    Evaluation time includes thorough review of current medical information, gathering information on past medical history/social history and prior level of function, completion of standardized testing/informal observation of tasks, assessment of data, and development of Plan of care and goals.      CPT codes:  Low Complexity PT evaluation (80874)  Therapeutic activities (55937)   15 minutes  1 unit(s)  Neuromuscular reeducation (93473)   10 minutes  1 unit(s)    Glenna Cox, PT

## 2020-12-29 NOTE — PROGRESS NOTES
Perfect serve message sent to Dr. Humaira Peterson, nephrology, regarding patient's lab work from [phone]. Will await orders.

## 2020-12-29 NOTE — PROGRESS NOTES
Per information given in report, labs were drawn at 1730 for BMP and urine osmolality. Call placed to lab due to results not yet in Epic. Lab states they did not have the orders and forgot to call the floor to ask. After I spoke with a different technician I was told they did have the orders and would run the labs right away.

## 2020-12-29 NOTE — PROGRESS NOTES
Nephrology Progress Note    12/29/20: Patient seen and examined. No family member is present at bedside. Chart reviewed. Pt somnolent  Arouses and answers questions appropriately            Vital SignsBP 120/69   Pulse 68   Temp 98.6 °F (37 °C) (Infrared)   Resp 22   Ht 5' 10\" (1.778 m)   Wt 208 lb 1.6 oz (94.4 kg)   SpO2 100%   BMI 29.86 kg/m²   24HR INTAKE/OUTPUT:      Intake/Output Summary (Last 24 hours) at 12/29/2020 1056  Last data filed at 12/29/2020 0900  Gross per 24 hour   Intake 2200.26 ml   Output 2450 ml   Net -249.74 ml         Physical Exam    Neck: No JVD  Lungs: Breath sounds decreased at the bases. A few expiratory rhonchi present. Heart: Regular rate and rhythm. No S3 gallop. No  murmrur. Abdomen: Soft non distended, non tender and normal bowel sounds. Extremeties: No edema.       ROS:  RESPIRATORY: (-) for shortness of breath  CARDIOVASCULAR:  negative  GASTROINTESTINAL:  negative  GENITOURINARY:  negative    Current Facility-Administered Medications   Medication Dose Route Frequency Provider Last Rate Last Admin    0.9 % sodium chloride bolus  20 mL Intravenous Once Rodrigue Nails DO        octreotide (SANDOSTATIN) 500 mcg in sodium chloride 0.9 % 100 mL infusion  50 mcg/hr Intravenous Continuous Taylor Player DO 10 mL/hr at 12/29/20 0830 50 mcg/hr at 12/29/20 0830    lidocaine PF 1 % injection 5 mL  5 mL Intradermal Once Jaswant Phillips MD        sodium chloride flush 0.9 % injection 10 mL  10 mL Intravenous PRN Jaswant Phillips MD        heparin flush 100 UNIT/ML injection 300 Units  3 mL Intravenous 2 times per day Jaswant Phillips MD        heparin flush 100 UNIT/ML injection 300 Units  3 mL Intracatheter PRN Jaswant Phillips MD        hydrocortisone sodium succinate PF (SOLU-CORTEF) injection 50 mg  50 mg Intravenous Q8H Jaswant Phillips MD   50 mg at 12/29/20 1138  [Held by provider] enoxaparin (LOVENOX) injection 40 mg  40 mg Subcutaneous Daily Martinez Walters MD   40 mg at 12/28/20 1004    promethazine (PHENERGAN) tablet 12.5 mg  12.5 mg Oral Q6H PRN Martinez Walters MD        Or    ondansetron (ZOFRAN) injection 4 mg  4 mg Intravenous Q6H PRN Martinez Walters MD   4 mg at 12/26/20 0606    polyethylene glycol (GLYCOLAX) packet 17 g  17 g Oral Daily PRN Martinez Walters MD        acetaminophen (TYLENOL) tablet 650 mg  650 mg Oral Q6H PRN Martinez Walters MD        Or    acetaminophen (TYLENOL) suppository 650 mg  650 mg Rectal Q6H PRN Martinez Walters MD   650 mg at 12/26/20 2325    LORazepam (ATIVAN) tablet 1 mg  1 mg Oral Q1H PRN Ti Cooper MD        Or    LORazepam (ATIVAN) injection 1 mg  1 mg Intravenous Q1H PRN Ti Cooper MD        Or    LORazepam (ATIVAN) tablet 2 mg  2 mg Oral Q1H PRN Ti Cooper MD        Or    LORazepam (ATIVAN) injection 2 mg  2 mg Intravenous Q1H PRN Ti Cooper MD   2 mg at 12/26/20 2226    Or    LORazepam (ATIVAN) tablet 3 mg  3 mg Oral Q1H PRN Ti Cooper MD        Or    LORazepam (ATIVAN) injection 3 mg  3 mg Intravenous Q1H PRN Ti Cooper MD   3 mg at 12/26/20 2326    Or    LORazepam (ATIVAN) tablet 4 mg  4 mg Oral Q1H PRN Ti Cooper MD        Or    LORazepam (ATIVAN) injection 4 mg  4 mg Intravenous Q1H PRN Ti Cooper MD           XR CHEST PORTABLE   Final Result   Continued bilateral pulmonary airspace infiltrates, with mild improved   aeration of the right lung base. US ABDOMEN LIMITED   Final Result   Mildly enlarged fatty liver. XR CHEST PORTABLE   Final Result   1. No interval change in the significant multifocal bilateral perihilar and   lower lobe infiltrates.       XR CHEST PORTABLE   Final Result   Bilateral pulmonary infiltrates no significant interval change      XR CHEST PORTABLE   Final Result   Left basilar pulmonary opacity which may represent atelectasis, pneumonia, effusion or a combination thereof. XR SHOULDER LEFT (MIN 2 VIEWS)   Final Result   No acute fracture or dislocation in the left shoulder. XR CHEST (2 VW)   Final Result   No acute cardiopulmonary process. XR CHEST PORTABLE    (Results Pending)         Labs:    CBC:   Recent Labs     12/27/20  0524 12/27/20  0524 12/28/20  0457 12/28/20  0457 12/28/20  2045 12/29/20  0445 12/29/20  0630   WBC 15.5*  --  7.3  --   --   --  7.2   HGB 8.9*   < > 6.2*   < > 7.5* 7.3* 7.3*     --  108*  --   --   --  127*    < > = values in this interval not displayed. Lab Results   Component Value Date    IRON 17 (L) 12/28/2020    TIBC 232 (L) 12/28/2020    FERRITIN 125 12/28/2020       Lab Results   Component Value Date    PTH 60 12/28/2020    CALCIUM 7.6 (L) 12/29/2020    CALCIUM 7.8 (L) 12/29/2020    CALCIUM 7.8 (L) 12/29/2020    CAION 1.17 12/29/2020    CAION 1.13 (L) 12/28/2020    PHOS 2.6 12/28/2020    PHOS 3.7 11/12/2015    PHOS 3.7 11/11/2015    MG 1.9 12/28/2020    MG 2.1 11/12/2015    MG 2.1 11/11/2015       BMP:   Recent Labs     12/29/20  0030 12/29/20  0445 12/29/20  0630   * 120* 119*   K 4.1 3.8 4.2   CL 90* 88* 88*   CO2 22 23 24   BUN 40* 39* 40*   CREATININE 1.1 1.0 1.0   GLUCOSE 220* 228* 218*             Assessment: / Plan:    1. Severe hyponatremia. Hyponatremia believed to be secondary to multiple factors including thiazide diuretic use, significant history of alcohol abuse in the form of beer limiting free water excretion due to low solute load as well as possibility of underlying SIADH. S/P 1 dose dDAVP on 12/28 at 2300  PLAN:1.Follow Na+  2. Follow Urine osm    2. Acute kidney injury.  FeNa <1% suggesting possible component of decreased effective renal perfusion in the setting of the hypotension  Cr down to 1.0mg/dl  PLAN:1. Continue to monitor 3.  Metabolic acidosis with increased anion gap. Patient with a lactic acidosis possible Type A secondary to tissue hypoperfusion as well as a possible Type B due to  use of Metformin. Lactic Acid down from 6.3-->2.5  PLAN:1. Continue off Metformin  2. Recheck Lactic Acid with the next Na+     4. Hypotension. Improved off  Dopamine  PLAN:1.Monitor    5. Microcytic Anemia. Hgb down to 6.2-->7.3 S/P 1 unit PRBC  Ferritin 125 with an iron sat 7%  PLAN:1. Would treat with IV Fe++ once infection cleared and pt off antibx    6. Hypocalcemia.  In the setting of hypoalbumionemia   Ionized Ca++ 1.17 WNL  PTH 60  Unspecified Vit D Def  With Vit D 12  PLAN:1.Start Vit D supplement          Electronically signed by Clifton Dumont MD

## 2020-12-29 NOTE — PROGRESS NOTES
Results still not in Epic for BMP and urine osmolality drawn at 1730. Lab states they are in process.

## 2020-12-29 NOTE — CARE COORDINATION
This note will not be viewable in [employer] for the following reason(s). Suspected substance abuse disorder. Peer Recovery Support Note    Name: Marilee Joy  Date: 12/29/2020    Chief Complaint   Patient presents with    Shortness of Breath     possible aspiration of emesis    Fall     multiple falls       Peer Support met with patient. [x] Support and education provided  [x] Resources provided   [] Treatment referral:   [] Other:   [] Patient declined peer recovery services     Referred By: Beau Alvarez    Notes: Patient not interested in alcohol rehab at this time.      Signed: Catrachito Segura, 12/29/2020

## 2020-12-29 NOTE — PROGRESS NOTES
 dexmedetomidine HCl in NaCl Stopped (12/29/20 1300)    DOPamine Stopped (12/28/20 1700)     sodium chloride flush, heparin flush, glucose, dextrose, glucagon (rDNA), dextrose, promethazine **OR** ondansetron, polyethylene glycol, acetaminophen **OR** acetaminophen, LORazepam **OR** LORazepam **OR** LORazepam **OR** LORazepam **OR** LORazepam **OR** LORazepam **OR** LORazepam **OR** LORazepam      REVIEW OF SYSTEMS:  Constitutional: Denies fever, weight loss, night sweats, and fatigue  Skin: Denies pigmentation, dark lesions, and rashes   HEENT: Denies hearing loss, tinnitus, ear drainage, epistaxis, sore throat, and hoarseness. Cardiovascular: Denies palpitations, chest pain, and chest pressure. Respiratory: Denies cough, dyspnea at rest, hemoptysis, apnea, and choking.   Gastrointestinal: Denies nausea, vomiting, poor appetite, diarrhea, heartburn or reflux  Genitourinary: Denies dysuria, frequency, urgency or hematuria  Musculoskeletal: Denies myalgias, muscle weakness, and bone pain  Neurological: Denies dizziness, vertigo, headache, and focal weakness  Psychological: Denies anxiety and depression  Endocrine: Denies heat intolerance and cold intolerance  Hematopoietic/Lymphatic: Denies bleeding problems and blood transfusions    OBJECTIVE:  Vitals:    12/29/20 1800   BP: 127/64   Pulse: 79   Resp: 26   Temp:    SpO2: 93%     FiO2 : 60 %  O2 Flow Rate (L/min): 2 L/min  O2 Device: Nasal cannula    PHYSICAL EXAM:  Constitutional: No fever, chills, diaphoresis  Skin: No rash, no skin breakdown  HEENT: Remarkable  Neck: No JVD or lymphadenopathy  Cardiovascular: S 1   y   S2 normal.  No S3-S4 murmurs or rubs present  Respiratory: Scattered inspiratory crackles at the bases  Gastrointestinal: Soft, slightly obese, nontender  Genitourinary: No CVA tenderness  Extremities: Clubbing, cyanosis, or edema Neurological: Awake, lucid, moves all extremities except weak on left side including hand and foot secondary to prior stroke  Traumatic amputation above the knuckle on the index finger of the left hand  Psychological: In good spirits.   Appropriate affect    LABS:  WBC   Date Value Ref Range Status   12/29/2020 7.2 4.5 - 11.5 E9/L Final   12/28/2020 7.3 4.5 - 11.5 E9/L Final   12/27/2020 15.5 (H) 4.5 - 11.5 E9/L Final     Hemoglobin   Date Value Ref Range Status   12/29/2020 7.4 (L) 12.5 - 16.5 g/dL Final   12/29/2020 7.3 (L) 12.5 - 16.5 g/dL Final   12/29/2020 7.3 (L) 12.5 - 16.5 g/dL Final     Hematocrit   Date Value Ref Range Status   12/29/2020 21.2 (L) 37.0 - 54.0 % Final   12/29/2020 20.6 (L) 37.0 - 54.0 % Final   12/29/2020 21.0 (L) 37.0 - 54.0 % Final     MCV   Date Value Ref Range Status   12/29/2020 82.7 80.0 - 99.9 fL Final   12/28/2020 82.6 80.0 - 99.9 fL Final   12/27/2020 79.4 (L) 80.0 - 99.9 fL Final     Platelets   Date Value Ref Range Status   12/29/2020 127 (L) 130 - 450 E9/L Final   12/28/2020 108 (L) 130 - 450 E9/L Final   12/27/2020 222 130 - 450 E9/L Final     Sodium   Date Value Ref Range Status   12/29/2020 120 (L) 132 - 146 mmol/L Final   12/29/2020 120 (L) 132 - 146 mmol/L Final   12/29/2020 119 (LL) 132 - 146 mmol/L Final     Potassium   Date Value Ref Range Status   12/29/2020 4.2 3.5 - 5.0 mmol/L Final     Potassium reflex Magnesium   Date Value Ref Range Status   12/29/2020 3.6 3.5 - 5.0 mmol/L Final   12/29/2020 3.7 3.5 - 5.0 mmol/L Final   12/29/2020 3.8 3.5 - 5.0 mmol/L Final     Chloride   Date Value Ref Range Status   12/29/2020 88 (L) 98 - 107 mmol/L Final   12/29/2020 87 (L) 98 - 107 mmol/L Final   12/29/2020 88 (L) 98 - 107 mmol/L Final     CO2   Date Value Ref Range Status   12/29/2020 24 22 - 29 mmol/L Final   12/29/2020 23 22 - 29 mmol/L Final   12/29/2020 24 22 - 29 mmol/L Final     BUN   Date Value Ref Range Status   12/29/2020 33 (H) 8 - 23 mg/dL Final 12/29/2020 35 (H) 8 - 23 mg/dL Final   12/29/2020 40 (H) 8 - 23 mg/dL Final     CREATININE   Date Value Ref Range Status   12/29/2020 1.0 0.7 - 1.2 mg/dL Final   12/29/2020 0.9 0.7 - 1.2 mg/dL Final   12/29/2020 1.0 0.7 - 1.2 mg/dL Final     Glucose   Date Value Ref Range Status   12/29/2020 205 (H) 74 - 99 mg/dL Final   12/29/2020 222 (H) 74 - 99 mg/dL Final   12/29/2020 218 (H) 74 - 99 mg/dL Final     Calcium   Date Value Ref Range Status   12/29/2020 7.8 (L) 8.6 - 10.2 mg/dL Final   12/29/2020 7.7 (L) 8.6 - 10.2 mg/dL Final   12/29/2020 7.6 (L) 8.6 - 10.2 mg/dL Final     Total Protein   Date Value Ref Range Status   12/29/2020 5.0 (L) 6.4 - 8.3 g/dL Final   12/28/2020 5.1 (L) 6.4 - 8.3 g/dL Final   03/01/2016 7.4 6.4 - 8.3 g/dL Final     Alb   Date Value Ref Range Status   12/29/2020 2.7 (L) 3.5 - 5.2 g/dL Final   12/28/2020 2.9 (L) 3.5 - 5.2 g/dL Final   03/01/2016 4.6 3.5 - 5.2 g/dL Final     Total Bilirubin   Date Value Ref Range Status   12/29/2020 1.4 (H) 0.0 - 1.2 mg/dL Final   12/28/2020 1.9 (H) 0.0 - 1.2 mg/dL Final   03/01/2016 0.6 0.0 - 1.2 mg/dL Final     Alkaline Phosphatase   Date Value Ref Range Status   12/29/2020 55 40 - 129 U/L Final   12/28/2020 49 40 - 129 U/L Final   03/01/2016 95 40 - 129 U/L Final     AST   Date Value Ref Range Status   12/29/2020 294 (H) 0 - 39 U/L Final     Comment:     Specimen is slightly Hemolyzed. Result may be artificially increased. 12/28/2020 442 (H) 0 - 39 U/L Final   03/01/2016 21 0 - 39 U/L Final     ALT   Date Value Ref Range Status   12/29/2020 257 (H) 0 - 40 U/L Final   12/28/2020 280 (H) 0 - 40 U/L Final   03/01/2016 25 0 - 40 U/L Final     GFR Non-   Date Value Ref Range Status   12/29/2020 >60 >=60 mL/min/1.73 Final     Comment:     Chronic Kidney Disease: less than 60 ml/min/1.73 sq.m. Kidney Failure: less than 15 ml/min/1.73 sq.m. Results valid for patients 18 years and older.      12/29/2020 >60 >=60 mL/min/1.73 Final Comment:     Chronic Kidney Disease: less than 60 ml/min/1.73 sq.m. Kidney Failure: less than 15 ml/min/1.73 sq.m. Results valid for patients 18 years and older. 12/29/2020 >60 >=60 mL/min/1.73 Final     Comment:     Chronic Kidney Disease: less than 60 ml/min/1.73 sq.m. Kidney Failure: less than 15 ml/min/1.73 sq.m. Results valid for patients 18 years and older. GFR    Date Value Ref Range Status   12/29/2020 >60  Final   12/29/2020 >60  Final   12/29/2020 >60  Final     Magnesium   Date Value Ref Range Status   12/28/2020 1.9 1.6 - 2.6 mg/dL Final   11/12/2015 2.1 1.6 - 2.6 mg/dL Final   11/11/2015 2.1 1.6 - 2.6 mg/dL Final     Phosphorus   Date Value Ref Range Status   12/28/2020 2.6 2.5 - 4.5 mg/dL Final   11/12/2015 3.7 2.5 - 4.5 mg/dL Final   11/11/2015 3.7 2.5 - 4.5 mg/dL Final     Recent Labs     12/29/20  0759   PH 7.486*   PO2 77.5   PCO2 32.4*   HCO3 23.9   BE 0.7   O2SAT 95.2       RADIOLOGY:  XR CHEST PORTABLE   Final Result   Continued bilateral pulmonary airspace infiltrates, with mild improved   aeration of the right lung base. US ABDOMEN LIMITED   Final Result   Mildly enlarged fatty liver. XR CHEST PORTABLE   Final Result   1. No interval change in the significant multifocal bilateral perihilar and   lower lobe infiltrates. XR CHEST PORTABLE   Final Result   Bilateral pulmonary infiltrates no significant interval change      XR CHEST PORTABLE   Final Result   Left basilar pulmonary opacity which may represent atelectasis, pneumonia,   effusion or a combination thereof. XR SHOULDER LEFT (MIN 2 VIEWS)   Final Result   No acute fracture or dislocation in the left shoulder. XR CHEST (2 VW)   Final Result   No acute cardiopulmonary process.       XR CHEST PORTABLE    (Results Pending)           PROBLEM LIST:  Principal Problem:    Hyponatremia  Active Problems:    CVA (cerebral vascular accident) ([facility] [address]    Diabetes mellitus ([address] Resolved Problems:    * No resolved hospital problems. *      IMPRESSION:  1. Pneumonia, likely aspiration  2. Prior right hemispheric stroke with left-sided weakness  3. 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 Alcohol withdrawal  5. Severe hyponatremia, likely beer [place]  6. Possible malnutrition      PLAN:  1. Small amounts of IV fluid per nephrology service to help correct serum sodium in a slow measured way  2. Continue Levaquin and metronidazole  3. Continue aerosolized bronchodilators  4. Can DC CIWA protocol  5. Continue thiamine  6. Continue DVT prophylaxis and GI bleed prophylaxis  7. Continue OT and PT to keep patient mobile    ATTESTATION:  ICU Staff Physician note of personal involvement in Care  As the attending physician, I certify that I personally reviewed the patients history and personally examined the patient to confirm the physical findings described above,  And that I reviewed the relevant imaging studies and available reports. I also discussed the differential diagnosis and all of the proposed management plans with the patient and individuals accompanying the patient to this visit. They had the opportunity to ask questions about the proposed management plans and to have those questions answered. This patient has a high probability of sudden, clinically significant deterioration, which requires the highest level of physician preparedness to intervene urgently. I managed/supervised life or organ supporting interventions that required frequent physician assessment. I devoted my full attention to the direct care of this patient for the amount of time indicated below. Time I spent with the family or surrogate(s) is included only if the patient was incapable of providing the necessary information or participating in medical decisions  Time devoted to teaching and to any procedures I billed separately is not included.     CRITICAL CARE TIME:

## 2020-12-29 NOTE — CARE COORDINATION
12/29/2020 Negative Covid (12/26/2020). Cm transition of care: pt continues icu care in the progressive care unit. Icu/precedex gtt, 2 liters nc. Pt's sister Malik Bee called CM- she is a  and is expressing concerns about Nano Davis returning to her brothers/mothers home, as previously. She states that her mother Williams Hardin has some dementia and has caregivers. Cm spoke to Williams Hardin she was appropriate during our conversation on 12/28/2020 and is the legal nok (mother of patient). [facility]: [phone] CM did not provide any clinical information about patient to her. Cm met with patient who is currently alert and oriented today 12/29 and he states that his sister is to have no information about him and that \"everytime something happens to me she brings up the past\", \"do not give her information about me\". Call to adult protection spoke to [facility] to call at discharge [phone] if pt returns to the home at [address] [place] [address]. Pt/ot new consults for [facility] - pt would like [facility] if they are accepting patients at discharge. Pt is not ready for discharge and no referrals made until therapy notes available and recommending [facility]. CmSs following.  Electronically signed by Mabel Rangel RN-BC on 12/29/2020 at 10:19 AM

## 2020-12-30 ENCOUNTER — APPOINTMENT (OUTPATIENT)
Dept: GENERAL RADIOLOGY | Age: 60
DRG: 871 | End: 2020-12-30
Payer: MEDICARE

## 2020-12-30 LAB
ALBUMIN SERPL-MCNC: 2.6 G/DL (ref 3.5–5.2)
ALP BLD-CCNC: 78 U/L (ref 40–129)
ALT SERPL-CCNC: 241 U/L (ref 0–40)
ANION GAP SERPL CALCULATED.3IONS-SCNC: 10 MMOL/L (ref 7–16)
ANION GAP SERPL CALCULATED.3IONS-SCNC: 8 MMOL/L (ref 7–16)
AST SERPL-CCNC: 216 U/L (ref 0–39)
BASOPHILS ABSOLUTE: 0 E9/L (ref 0–0.2)
BASOPHILS RELATIVE PERCENT: 0 % (ref 0–2)
BILIRUB SERPL-MCNC: 1.3 MG/DL (ref 0–1.2)
BILIRUBIN DIRECT: 0.8 MG/DL (ref 0–0.3)
BILIRUBIN, INDIRECT: 0.5 MG/DL (ref 0–1)
BUN BLDV-MCNC: 18 MG/DL (ref 8–23)
BUN BLDV-MCNC: 21 MG/DL (ref 8–23)
BUN BLDV-MCNC: 23 MG/DL (ref 8–23)
BUN BLDV-MCNC: 25 MG/DL (ref 8–23)
CALCIUM IONIZED: 1.16 MMOL/L (ref 1.15–1.33)
CALCIUM SERPL-MCNC: 7.6 MG/DL (ref 8.6–10.2)
CALCIUM SERPL-MCNC: 7.7 MG/DL (ref 8.6–10.2)
CALCIUM SERPL-MCNC: 7.9 MG/DL (ref 8.6–10.2)
CALCIUM SERPL-MCNC: 8 MG/DL (ref 8.6–10.2)
CHLORIDE BLD-SCNC: 90 MMOL/L (ref 98–107)
CHLORIDE BLD-SCNC: 90 MMOL/L (ref 98–107)
CHLORIDE BLD-SCNC: 91 MMOL/L (ref 98–107)
CHLORIDE BLD-SCNC: 92 MMOL/L (ref 98–107)
CO2: 23 MMOL/L (ref 22–29)
CO2: 23 MMOL/L (ref 22–29)
CO2: 25 MMOL/L (ref 22–29)
CO2: 25 MMOL/L (ref 22–29)
CREAT SERPL-MCNC: 0.8 MG/DL (ref 0.7–1.2)
CREAT SERPL-MCNC: 0.9 MG/DL (ref 0.7–1.2)
EOSINOPHILS ABSOLUTE: 0.01 E9/L (ref 0.05–0.5)
EOSINOPHILS RELATIVE PERCENT: 0.1 % (ref 0–6)
GFR AFRICAN AMERICAN: >60
GFR NON-AFRICAN AMERICAN: >60 ML/MIN/1.73
GLUCOSE BLD-MCNC: 150 MG/DL (ref 74–99)
GLUCOSE BLD-MCNC: 184 MG/DL (ref 74–99)
GLUCOSE BLD-MCNC: 199 MG/DL (ref 74–99)
GLUCOSE BLD-MCNC: 203 MG/DL (ref 74–99)
HCT VFR BLD CALC: 21.4 % (ref 37–54)
HEMOGLOBIN: 7.6 G/DL (ref 12.5–16.5)
IGG: 675 MG/DL (ref 700–1600)
IMMATURE GRANULOCYTES #: 0.18 E9/L
IMMATURE GRANULOCYTES %: 1.5 % (ref 0–5)
LYMPHOCYTES ABSOLUTE: 1.53 E9/L (ref 1.5–4)
LYMPHOCYTES RELATIVE PERCENT: 12.5 % (ref 20–42)
MAGNESIUM: 2 MG/DL (ref 1.6–2.6)
MAGNESIUM: 2.2 MG/DL (ref 1.6–2.6)
MAGNESIUM: 2.4 MG/DL (ref 1.6–2.6)
MCH RBC QN AUTO: 29.5 PG (ref 26–35)
MCHC RBC AUTO-ENTMCNC: 35.5 % (ref 32–34.5)
MCV RBC AUTO: 82.9 FL (ref 80–99.9)
METER GLUCOSE: 172 MG/DL (ref 74–99)
METER GLUCOSE: 176 MG/DL (ref 74–99)
METER GLUCOSE: 179 MG/DL (ref 74–99)
MONOCYTES ABSOLUTE: 1.41 E9/L (ref 0.1–0.95)
MONOCYTES RELATIVE PERCENT: 11.5 % (ref 2–12)
NEUTROPHILS ABSOLUTE: 9.08 E9/L (ref 1.8–7.3)
NEUTROPHILS RELATIVE PERCENT: 74.4 % (ref 43–80)
OSMOLALITY URINE: 287 MOSM/KG (ref 300–900)
OSMOLALITY URINE: 309 MOSM/KG (ref 300–900)
OSMOLALITY URINE: 347 MOSM/KG (ref 300–900)
OSMOLALITY URINE: 347 MOSM/KG (ref 300–900)
OSMOLALITY URINE: 380 MOSM/KG (ref 300–900)
OSMOLALITY URINE: 489 MOSM/KG (ref 300–900)
PDW BLD-RTO: 15 FL (ref 11.5–15)
PLATELET # BLD: 149 E9/L (ref 130–450)
PMV BLD AUTO: 10.1 FL (ref 7–12)
POTASSIUM REFLEX MAGNESIUM: 3.1 MMOL/L (ref 3.5–5)
POTASSIUM REFLEX MAGNESIUM: 3.3 MMOL/L (ref 3.5–5)
POTASSIUM REFLEX MAGNESIUM: 3.4 MMOL/L (ref 3.5–5)
POTASSIUM SERPL-SCNC: 3.1 MMOL/L (ref 3.5–5)
RBC # BLD: 2.58 E12/L (ref 3.8–5.8)
SODIUM BLD-SCNC: 123 MMOL/L (ref 132–146)
SODIUM BLD-SCNC: 124 MMOL/L (ref 132–146)
TOTAL PROTEIN: 5.2 G/DL (ref 6.4–8.3)
WBC # BLD: 12.2 E9/L (ref 4.5–11.5)

## 2020-12-30 PROCEDURE — 2580000003 HC RX 258: Performed by: INTERNAL MEDICINE

## 2020-12-30 PROCEDURE — 2700000000 HC OXYGEN THERAPY PER DAY

## 2020-12-30 PROCEDURE — 80053 COMPREHEN METABOLIC PANEL: CPT

## 2020-12-30 PROCEDURE — 94660 CPAP INITIATION&MGMT: CPT

## 2020-12-30 PROCEDURE — 6370000000 HC RX 637 (ALT 250 FOR IP): Performed by: INTERNAL MEDICINE

## 2020-12-30 PROCEDURE — 97166 OT EVAL MOD COMPLEX 45 MIN: CPT

## 2020-12-30 PROCEDURE — 83935 ASSAY OF URINE OSMOLALITY: CPT

## 2020-12-30 PROCEDURE — C9113 INJ PANTOPRAZOLE SODIUM, VIA: HCPCS | Performed by: INTERNAL MEDICINE

## 2020-12-30 PROCEDURE — 6360000002 HC RX W HCPCS: Performed by: INTERNAL MEDICINE

## 2020-12-30 PROCEDURE — 83735 ASSAY OF MAGNESIUM: CPT

## 2020-12-30 PROCEDURE — 99232 SBSQ HOSP IP/OBS MODERATE 35: CPT | Performed by: INTERNAL MEDICINE

## 2020-12-30 PROCEDURE — 71045 X-RAY EXAM CHEST 1 VIEW: CPT

## 2020-12-30 PROCEDURE — 94640 AIRWAY INHALATION TREATMENT: CPT

## 2020-12-30 PROCEDURE — 82962 GLUCOSE BLOOD TEST: CPT

## 2020-12-30 PROCEDURE — 82330 ASSAY OF CALCIUM: CPT

## 2020-12-30 PROCEDURE — 36415 COLL VENOUS BLD VENIPUNCTURE: CPT

## 2020-12-30 PROCEDURE — 2500000003 HC RX 250 WO HCPCS: Performed by: INTERNAL MEDICINE

## 2020-12-30 PROCEDURE — 85025 COMPLETE CBC W/AUTO DIFF WBC: CPT

## 2020-12-30 PROCEDURE — 1200000000 HC SEMI PRIVATE

## 2020-12-30 PROCEDURE — 82248 BILIRUBIN DIRECT: CPT

## 2020-12-30 PROCEDURE — 97530 THERAPEUTIC ACTIVITIES: CPT

## 2020-12-30 PROCEDURE — 80048 BASIC METABOLIC PNL TOTAL CA: CPT

## 2020-12-30 RX ORDER — POTASSIUM CHLORIDE 20 MEQ/1
40 TABLET, EXTENDED RELEASE ORAL ONCE
Status: COMPLETED | OUTPATIENT
Start: 2020-12-30 | End: 2020-12-30

## 2020-12-30 RX ORDER — POTASSIUM CHLORIDE 7.45 MG/ML
10 INJECTION INTRAVENOUS
Status: COMPLETED | OUTPATIENT
Start: 2020-12-30 | End: 2020-12-30

## 2020-12-30 RX ORDER — LEVOFLOXACIN 5 MG/ML
500 INJECTION, SOLUTION INTRAVENOUS EVERY 24 HOURS
Status: DISCONTINUED | OUTPATIENT
Start: 2020-12-30 | End: 2021-01-05

## 2020-12-30 RX ORDER — DEXTROSE MONOHYDRATE 50 MG/ML
INJECTION, SOLUTION INTRAVENOUS CONTINUOUS
Status: DISCONTINUED | OUTPATIENT
Start: 2020-12-30 | End: 2020-12-31

## 2020-12-30 RX ORDER — LORAZEPAM 0.5 MG/1
0.5 TABLET ORAL 3 TIMES DAILY PRN
Status: DISCONTINUED | OUTPATIENT
Start: 2020-12-30 | End: 2021-01-05 | Stop reason: HOSPADM

## 2020-12-30 RX ADMIN — IPRATROPIUM BROMIDE AND ALBUTEROL SULFATE 1 AMPULE: .5; 3 SOLUTION RESPIRATORY (INHALATION) at 02:02

## 2020-12-30 RX ADMIN — IPRATROPIUM BROMIDE AND ALBUTEROL SULFATE 1 AMPULE: .5; 3 SOLUTION RESPIRATORY (INHALATION) at 20:57

## 2020-12-30 RX ADMIN — ATORVASTATIN CALCIUM 40 MG: 20 TABLET, FILM COATED ORAL at 21:44

## 2020-12-30 RX ADMIN — THIAMINE HCL TAB 100 MG 100 MG: 100 TAB at 09:14

## 2020-12-30 RX ADMIN — OCTREOTIDE ACETATE 50 MCG/HR: 500 INJECTION, SOLUTION INTRAVENOUS; SUBCUTANEOUS at 04:11

## 2020-12-30 RX ADMIN — POTASSIUM CHLORIDE 10 MEQ: 7.46 INJECTION, SOLUTION INTRAVENOUS at 11:01

## 2020-12-30 RX ADMIN — DEXTROSE MONOHYDRATE: 50 INJECTION, SOLUTION INTRAVENOUS at 04:11

## 2020-12-30 RX ADMIN — POTASSIUM CHLORIDE 40 MEQ: 20 TABLET, EXTENDED RELEASE ORAL at 02:19

## 2020-12-30 RX ADMIN — Medication 10 ML: at 09:14

## 2020-12-30 RX ADMIN — METRONIDAZOLE 500 MG: 500 INJECTION, SOLUTION INTRAVENOUS at 10:14

## 2020-12-30 RX ADMIN — INSULIN LISPRO 2 UNITS: 100 INJECTION, SOLUTION INTRAVENOUS; SUBCUTANEOUS at 05:33

## 2020-12-30 RX ADMIN — IPRATROPIUM BROMIDE AND ALBUTEROL SULFATE 1 AMPULE: .5; 3 SOLUTION RESPIRATORY (INHALATION) at 18:28

## 2020-12-30 RX ADMIN — Medication 10 ML: at 21:46

## 2020-12-30 RX ADMIN — PANTOPRAZOLE SODIUM 40 MG: 40 INJECTION, POWDER, LYOPHILIZED, FOR SOLUTION INTRAVENOUS at 21:46

## 2020-12-30 RX ADMIN — METRONIDAZOLE 500 MG: 500 INJECTION, SOLUTION INTRAVENOUS at 18:07

## 2020-12-30 RX ADMIN — POTASSIUM CHLORIDE 40 MEQ: 20 TABLET, EXTENDED RELEASE ORAL at 20:00

## 2020-12-30 RX ADMIN — LORAZEPAM 0.5 MG: 0.5 TABLET ORAL at 16:04

## 2020-12-30 RX ADMIN — PANTOPRAZOLE SODIUM 40 MG: 40 INJECTION, POWDER, LYOPHILIZED, FOR SOLUTION INTRAVENOUS at 09:14

## 2020-12-30 RX ADMIN — METRONIDAZOLE 500 MG: 500 INJECTION, SOLUTION INTRAVENOUS at 02:19

## 2020-12-30 RX ADMIN — POTASSIUM CHLORIDE 10 MEQ: 7.46 INJECTION, SOLUTION INTRAVENOUS at 10:14

## 2020-12-30 RX ADMIN — IPRATROPIUM BROMIDE AND ALBUTEROL SULFATE 1 AMPULE: .5; 3 SOLUTION RESPIRATORY (INHALATION) at 14:09

## 2020-12-30 RX ADMIN — DEXTROSE MONOHYDRATE: 50 INJECTION, SOLUTION INTRAVENOUS at 09:07

## 2020-12-30 RX ADMIN — INSULIN GLARGINE 24 UNITS: 100 INJECTION, SOLUTION SUBCUTANEOUS at 21:47

## 2020-12-30 RX ADMIN — LEVOFLOXACIN 500 MG: 5 INJECTION, SOLUTION INTRAVENOUS at 17:07

## 2020-12-30 ASSESSMENT — PAIN SCALES - GENERAL
PAINLEVEL_OUTOF10: 0
PAINLEVEL_OUTOF10: 0

## 2020-12-30 NOTE — PLAN OF CARE
Problem: Airway Clearance - Ineffective:  Goal: Ability to maintain a clear airway will improve  Description: Ability to maintain a clear airway will improve  Outcome: Met This Shift     Problem: Anxiety/Stress:  Goal: Level of anxiety will decrease  Description: Level of anxiety will decrease  Outcome: Met This Shift     Problem: Cardiac Output - Decreased:  Goal: Hemodynamic stability will improve  Description: Hemodynamic stability will improve  12/30/2020 0308 by Nidia Dixon RN  Outcome: Met This Shift     Problem: Gas Exchange - Impaired:  Goal: Levels of oxygenation will improve  Description: Levels of oxygenation will improve  Outcome: Met This Shift     Problem: Tissue Perfusion - Cardiopulmonary, Altered:  Goal: Absence of angina  Description: Absence of angina  Outcome: Met This Shift     Problem: Falls - Risk of:  Goal: Will remain free from falls  Description: Will remain free from falls  Outcome: Met This Shift     Problem: Falls - Risk of:  Goal: Absence of physical injury  Description: Absence of physical injury  Outcome: Met This Shift

## 2020-12-30 NOTE — PROGRESS NOTES
OCCUPATIONAL THERAPY INITIAL EVALUATION      Date:2020  Patient Name: Francy Foster  MRN: [id number]  : 1960  Room: [address] [place] [address]    Referring Provider: Moises Howard MD    Evaluating OT: Be Eastern, OTR/L #[id number]    AM-PAC Daily Activity Raw Score:   Tentative Placement Recommendation: TALYA    Recommended Adaptive Equipment/DME: To be further assessed      Diagnosis:    1. Hyponatremia    2. Frequent falls       Patient Active Problem List   Diagnosis    [facility]    Diabetes mellitus ([facility]    Hyponatremia     Pertinent Medical History:   Past Medical History:   Diagnosis Date    [facility]     [facility]     Type 2    Hypertension       Precautions:  Falls, bed alarm, O2,      Home Living: Pt lives with brother (who has MS) and mother in a 2 story with 3 step(s) to enter and 1 rail(s); bed/bath on 1st   Bathroom setup: tub shower grab bars  Equipment owned: cane    Prior Level of Function:  Independent  with ADLs , Independent  with IADLs; using cane for ambulation. Driving: yes       Pain Level: left groin 8/10  Cognition: A&O: 4/4;   Follows 2 step directions: fair    Memory:  fair    Sequencing:  fair    Problem solving:  fair    Judgement/safety:  fair      Functional Assessment:   Initial Eval Status  Date: 20 Treatment Status  Date: STG=LTG  Time frame: 5-7 days   Feeding Minimal Assist   Independent    Grooming Maximal Assist   Minimal Assist    UB Dressing Maximal Assist   Minimal Assist    LB Dressing Maximal Assist   Minimal Assist    Bathing Maximal Assist  Simulated sitting EOB  Minimal Assist    Toileting Dependent   Moderate Assist    Bed Mobility  Supine to sit: Moderate Assist   Sit to supine:  Moderate Assist   Supine to sit: Supervision   Sit to supine: Supervision    Functional Transfers Sit to stand: Maximal Assist   Stand to sit: Maximal Assist Stand pivot: NT   Supervision    Functional Mobility NT    Min A with AAD  Short distance   Balance Sitting:     Static:  SBA    Dynamic:mod A  Standing: mod /max A  Sitting:     Static:  indep    Dynamic:indep  Standing: min A with AAD   Activity Tolerance poor  fair   Visual/  Perceptual Glasses: no          BUE  ROM/Strength/  Fine motor Coordination Hand dominance: R    RUE: ROM 90 shoulder flex, WFL distally     Strength: grossly 3/5      Strength: fair     Coordination:  fair    LUE: ROM minimal shoulder flex, partial elbow flex, finger flex/ext     Strength: grossly 2+/5      Strength: poor     Coordination: poor  Increase BUE strength 1/3 muscle grade for  Improved indep with functional transfers     Hearing: WFL   Sensation:   No c/o numbness or tingling   Tone:  WFL   Edema: LUE noted    Comments: Upon arrival patient im bed. Overall patient demonstrated  decreased  independence with  ADLs,  bed mobility,functional transfers,  At end of session, patient in bed with call light and phone within reach, all lines and tubes intact. Pt would benefit from continued skilled OT to increase independence with  ADLs, functional mobility,  safety,  and quality of life. Treatment: OT treatment provided this date includes:  ? ADL-  Instruction/training on safety and adapted techniques for completion of ADLs:   ?  Mobility-  Instruction/training on safety and improved independence with bed mobility/functional transfers   ? Sitting EOB x 10 minutes to improve dynamic sitting balance and activity tolerance during ADLs. ? Skilled positioning/alignment-  Proper Positioning/Alignment LUE on pillow to reduce edema in LUE. ? Skilled monitoring of O2 sats-   SpO2 at rest 98%, SpO2 at end of session 94%  ?   Therapeutic Exercises- Instruction on BUE ROM exercises to improve strength and function of BUE for improved indep with ADLs/functional transfers    Assessment of current deficits Functional mobility [x]  ADLs [x] Strength [x]  Cognition [x]  Functional transfers  [x] IADLs [] Safety Awareness [x]  Endurance [x]  Fine Motor Coordination [x] Balance [x] Vision/perception [] Sensation []   Gross Motor Coordination [x] ROM [x] Delirium []                  Motor Control []    Plan of Care: 1-3 days/wk for 1-2 weeks PRN     Instruction/training on adapted ADL techniques and AE recommendations to increase functional independence within precautions  Training on energy conservation strategies/techniques to improve independence/tolerance for self-care routine  Functional transfer/mobility training/DME recommendations for increased independence, safety, and fall prevention  Patient/Family education to increase follow through with safety techniques and functional independence  Recommendation of environmental modifications for increased safety with functional transfers/mobility and ADLs  Therapeutic exercise to improve motor endurance, ROM, and functional strength for ADLs/functional transfers  Therapeutic activities to facilitate/challenge dynamic balance, stand tolerance, fine motor dexterity/in-hand manipulation for increased independence with ADLs  Positioning to improve functional independence      Rehab Potential: Good for established goals     Patient / Family Goal:  not stated     Patient and/or family were instructed on functional diagnosis, prognosis/goals and OT plan of care. Demonstrated good- understanding. · Mod Complexity  · History: Expanded review of medical records and additional review of physical, cognitive, or psychosocial history related to current functional performance  · Exam: 5+ performance deficits  · Assistance/Modification: mod/max assistance or modifications required to perform tasks. May have comorbidities that affect occupational performance.   mod Evaluation complexity completed +     Time In: 8:27  Time Out: 8:54  Total Treatment Time: 23 minutes    Min Units

## 2020-12-30 NOTE — PROGRESS NOTES
Nephrology Progress Note    12/30/20: Patient seen and examined. No family member is present at bedside. Chart reviewed. Pt awake alert and states he feels somewhat anxious            Vital SignsBP 133/71   Pulse 97   Temp 96.8 °F (36 °C) (Infrared)   Resp 29   Ht 5' 10\" (1.778 m)   Wt 208 lb 1.6 oz (94.4 kg)   SpO2 91%   BMI 29.86 kg/m²   24HR INTAKE/OUTPUT:      Intake/Output Summary (Last 24 hours) at 12/30/2020 0931  Last data filed at 12/30/2020 0444  Gross per 24 hour   Intake 4387 ml   Output 3250 ml   Net 1137 ml         Physical Exam    Neck: No JVD  Lungs: Breath sounds decreased at the bases. A few expiratory rhonchi present. Heart: Regular rate and rhythm. No S3 gallop. No  murmrur. Abdomen: Soft non distended, non tender and normal bowel sounds. Extremeties: No edema.       ROS:  RESPIRATORY: (-) for shortness of breath  CARDIOVASCULAR:  negative  GASTROINTESTINAL:  negative  GENITOURINARY:  negative    Current Facility-Administered Medications   Medication Dose Route Frequency Provider Last Rate Last Admin    dextrose 5 % solution   Intravenous Continuous Perri Burns MD 50 mL/hr at 12/30/20 0895 New Bag at 12/30/20 0907    vitamin D (ERGOCALCIFEROL) capsule 50,000 Units  50,000 Units Oral Weekly Perri Burns MD   50,000 Units at 12/29/20 1605    insulin glargine (LANTUS) injection vial 24 Units  0.25 Units/kg Subcutaneous Nightly Amy Stoddard MD   24 Units at 12/29/20 2152    0.9 % sodium chloride bolus  20 mL Intravenous Once Elyssa Pfeiffer, DO        octreotide (SANDOSTATIN) 500 mcg in sodium chloride 0.9 % 100 mL infusion  50 mcg/hr Intravenous Continuous Damion Coulter DO 10 mL/hr at 12/30/20 0411 50 mcg/hr at 12/30/20 0411    lidocaine PF 1 % injection 5 mL  5 mL Intradermal Once Amy Stoddard MD        sodium chloride flush 0.9 % injection 10 mL  10 mL Intravenous PRN Amy Stoddard MD  heparin flush 100 UNIT/ML injection 300 Units  3 mL Intravenous 2 times per day Steffany Copeland MD        heparin flush 100 UNIT/ML injection 300 Units  3 mL Intracatheter PRN Steffany Copeland MD        ipratropium-albuterol (DUONEB) nebulizer solution 1 ampule  1 ampule Inhalation Q4H Steffany Copeland MD   1 ampule at 12/30/20 0202    levoFLOXacin (LEVAQUIN) 500 MG/100ML infusion 500 mg  500 mg Intravenous Q48H Steffany Copeland MD   Stopped at 12/29/20 1220    metronidazole (FLAGYL) 500 mg in NaCl 100 mL IVPB premix  500 mg Intravenous Q8H Steffany Copeland MD   Stopped at 12/30/20 0319    glucose (GLUTOSE) 40 % oral gel 15 g  15 g Oral PRN Steffany Copeland MD        dextrose 50 % IV solution  12.5 g Intravenous PRN Steffany Copeland MD        glucagon (rDNA) injection 1 mg  1 mg Intramuscular PRN Steffany Copeland MD        dextrose 5 % solution  100 mL/hr Intravenous PRN Steffany Copeland MD        pantoprazole (PROTONIX) injection 40 mg  40 mg Intravenous BID Payne Numbers, DO   40 mg at 12/30/20 0914    insulin lispro (HUMALOG) injection vial 0-12 Units  0-12 Units Subcutaneous Q6H Steffany Copeland MD   2 Units at 12/30/20 0533    DOPamine (INTROPIN) 800 mg in dextrose 5 % 250 mL infusion  3 mcg/kg/min Intravenous Continuous Steffany Copeland MD   Stopped at 12/28/20 1700    [Held by provider] aspirin EC tablet 81 mg  81 mg Oral Daily Martinez Walters MD   81 mg at 12/26/20 0902    atorvastatin (LIPITOR) tablet 40 mg  40 mg Oral Nightly Martinez Walters MD   40 mg at 12/29/20 2022    [Held by provider] clopidogrel (PLAVIX) tablet 75 mg  75 mg Oral Daily Martinez Walters MD   75 mg at 12/26/20 0902    thiamine mononitrate tablet 100 mg  100 mg Oral Daily Martinez Walters MD   100 mg at 12/30/20 0914    sodium chloride flush 0.9 % injection 10 mL  10 mL Intravenous 2 times per day Martinez Walters MD   10 mL at 12/30/20 0914    enoxaparin (LOVENOX) injection 40 mg  40 mg Subcutaneous Daily Martinez Walters MD   40 mg at 12/28/20 1004  promethazine (PHENERGAN) tablet 12.5 mg  12.5 mg Oral Q6H PRN Martinez Walters MD        Or    ondansetron (ZOFRAN) injection 4 mg  4 mg Intravenous Q6H PRN Martinez Walters MD   4 mg at 12/26/20 0606    polyethylene glycol (GLYCOLAX) packet 17 g  17 g Oral Daily PRN Martinez Walters MD        acetaminophen (TYLENOL) tablet 650 mg  650 mg Oral Q6H PRN Martinez Walters MD        Or    acetaminophen (TYLENOL) suppository 650 mg  650 mg Rectal Q6H PRN Martinez Walters MD   650 mg at 12/26/20 2325    LORazepam (ATIVAN) tablet 1 mg  1 mg Oral Q1H PRN Judi Tran MD        Or    LORazepam (ATIVAN) injection 1 mg  1 mg Intravenous Q1H PRN Judi Tran MD        Or    LORazepam (ATIVAN) tablet 2 mg  2 mg Oral Q1H PRN Judi Tran MD        Or    LORazepam (ATIVAN) injection 2 mg  2 mg Intravenous Q1H PRN Judi Tran MD   2 mg at 12/26/20 2226    Or    LORazepam (ATIVAN) tablet 3 mg  3 mg Oral Q1H PRN Judi MD Marc        Or    LORazepam (ATIVAN) injection 3 mg  3 mg Intravenous Q1H PRN Judi Tran MD   3 mg at 12/26/20 2326    Or    LORazepam (ATIVAN) tablet 4 mg  4 mg Oral Q1H PRN Judi MD Marc        Or    LORazepam (ATIVAN) injection 4 mg  4 mg Intravenous Q1H PRN Judi MD Marc           XR CHEST PORTABLE   Final Result   Continued bilateral pulmonary airspace infiltrates, with mild improved   aeration of the right lung base. US ABDOMEN LIMITED   Final Result   Mildly enlarged fatty liver. XR CHEST PORTABLE   Final Result   1. No interval change in the significant multifocal bilateral perihilar and   lower lobe infiltrates. XR CHEST PORTABLE   Final Result   Bilateral pulmonary infiltrates no significant interval change      XR CHEST PORTABLE   Final Result   Left basilar pulmonary opacity which may represent atelectasis, pneumonia,   effusion or a combination thereof. XR SHOULDER LEFT (MIN 2 VIEWS)   Final Result   No acute fracture or dislocation in the left shoulder. XR CHEST (2 VW)   Final Result   No acute cardiopulmonary process. XR CHEST PORTABLE    (Results Pending)         Labs:    CBC:   Recent Labs     12/28/20  0457 12/28/20  0457 12/29/20  0630 12/29/20  1150 12/29/20  2110 12/30/20  0430   WBC 7.3  --  7.2  --   --  12.2*   HGB 6.2*   < > 7.3* 7.4* 7.7* 7.6*   *  --  127*  --   --  149    < > = values in this interval not displayed. Lab Results   Component Value Date    IRON 17 (L) 12/28/2020    TIBC 232 (L) 12/28/2020    FERRITIN 125 12/28/2020       Lab Results   Component Value Date    PTH 60 12/28/2020    CALCIUM 7.9 (L) 12/30/2020    CALCIUM 7.7 (L) 12/30/2020    CALCIUM 7.8 (L) 12/29/2020    CAION 1.16 12/30/2020    CAION 1.17 12/29/2020    CAION 1.13 (L) 12/28/2020    PHOS 2.6 12/28/2020    PHOS 3.7 11/12/2015    PHOS 3.7 11/11/2015    MG 2.4 12/30/2020    MG 1.9 12/28/2020    MG 2.1 11/12/2015       BMP:   Recent Labs     12/29/20  2005 12/30/20  0010 12/30/20  0430   * 123* 123*   K 3.7 3.3* 3.1*   CL 90* 92* 90*   CO2 23 23 23   BUN 29* 25* 23   CREATININE 0.9 0.9 0.9   GLUCOSE 216* 203* 199*             Assessment: / Plan:    1. Severe hyponatremia. Hyponatremia believed to be secondary to multiple factors including thiazide diuretic use, significant history of alcohol abuse in the form of beer limiting free water excretion due to low solute load as well as possibility of underlying SIADH. S/P 1 dose dDAVP on 12/28 at 2300  Na+ up to 123 this AM  PLAN:1.Follow Na+  2. Follow Urine osm  3. Decrease the rate of the D5W    2. Acute kidney injury. FeNa <1% suggesting possible component of decreased effective renal perfusion in the setting of the hypotension  Cr down to 0.9mg/dl  PLAN:1. Continue to monitor    3. Metabolic acidosis with increased anion gap. Patient with a lactic acidosis possible Type A secondary to tissue hypoperfusion as well as a possible Type B due to  use of Metformin. Lactic Acid down from 6.3-->2.5-->2.2=>Resolved  PLAN:1. Continue off Metformin     4. Hypotension. Resolved  PLAN:1.Monitor    5. Microcytic Anemia. Hgb down to 6.2-->7.3->7.6 S/P 1 unit PRBC 12/28/20  Ferritin 125 with an iron sat 7%  PLAN:1. Would treat with IV Fe++ once infection cleared and pt off antibx    6. Hypocalcemia.  In the setting of hypoalbumionemia   Ionized Ca++ 1.16 WNL  PTH 60  Unspecified Vit D Def  With Vit D 12  PLAN:1.Continue Vit D supplement          Electronically signed by Tameka Sheridan MD

## 2020-12-30 NOTE — PROGRESS NOTES
12/30/2020  [id number]  [id number]      Patient unavailable for physical therapy treatment due to declined despite encouragement. Patient stated he is fatigued from transferring to new room. Will attempt treatment at a later time/date.       Serene Gene, nodila  LIC# [id number]

## 2020-12-30 NOTE — PROGRESS NOTES
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 We will give him small doses of lorazepam 0.5 mg p.o. 3 times daily for the shakes which he says helps him. LFTs are improving. Chest x-ray still shows some persistent infiltrates. He has not had a CT scan of his chest since he has been here and we will order this for tomorrow. Levofloxacin and metronidazole will continue. Because his BUN and creatinine have improved, we can change Levaquin to daily. Refused physical therapy today because he was doing too many things according to him. He said he will work with the therapists tomorrow.     MEDICATIONS:   vitamin D  50,000 Units Oral Weekly    insulin glargine  0.25 Units/kg Subcutaneous Nightly    sodium chloride  20 mL Intravenous Once    lidocaine PF  5 mL Intradermal Once    heparin flush  3 mL Intravenous 2 times per day    ipratropium-albuterol  1 ampule Inhalation Q4H    levofloxacin  500 mg Intravenous Q48H    metroNIDAZOLE  500 mg Intravenous Q8H    pantoprazole  40 mg Intravenous BID    insulin lispro  0-12 Units Subcutaneous Q6H    [Held by provider] aspirin  81 mg Oral Daily    atorvastatin  40 mg Oral Nightly    [Held by provider] clopidogrel  75 mg Oral Daily    thiamine mononitrate  100 mg Oral Daily    sodium chloride flush  10 mL Intravenous 2 times per day    enoxaparin  40 mg Subcutaneous Daily      dextrose 50 mL/hr at 12/30/20 0907    octreotide (SANDOSTATIN) infusion 50 mcg/hr (12/30/20 0411)    dextrose      DOPamine Stopped (12/28/20 1700) LORazepam, sodium chloride flush, heparin flush, glucose, dextrose, glucagon (rDNA), dextrose, promethazine **OR** ondansetron, polyethylene glycol, acetaminophen **OR** acetaminophen      REVIEW OF SYSTEMS:  Constitutional: Denies fever, weight loss, night sweats, and fatigue  Skin: Denies pigmentation, dark lesions, and rashes   HEENT: Denies hearing loss, tinnitus, ear drainage, epistaxis, sore throat, and hoarseness. Cardiovascular: Denies palpitations, chest pain, and chest pressure. Respiratory: Denies cough, dyspnea at rest, hemoptysis, apnea, and choking. Gastrointestinal: Denies nausea, vomiting, poor appetite, diarrhea, heartburn or reflux  Genitourinary: Denies dysuria, frequency, urgency or hematuria  Musculoskeletal: Denies myalgias, muscle weakness, and bone pain  Neurological: Denies dizziness, vertigo, headache, and focal weakness  Psychological: Denies anxiety and depression  Endocrine: Denies heat intolerance and cold intolerance  Hematopoietic/Lymphatic: Denies bleeding problems and blood transfusions    OBJECTIVE:  Vitals:    12/30/20 1330   BP:    Pulse:    Resp:    Temp:    SpO2: 95%     FiO2 : 60 %  O2 Flow Rate (L/min): 4 L/min  O2 Device: Nasal cannula    PHYSICAL EXAM:  Constitutional: No fever, chills, diaphoresis  Skin: Skin rash, no skin breakdown  HEENT: Unremarkable  Neck: No JVD, lymphadenopathy, thyromegaly  Cardiovascular: S1, S2 normal.  No S3 murmurs rubs present  Respiratory: Few inspiratory crackles bilaterally. No wheezes  Gastrointestinal: Soft, obese, nontender  Genitourinary: No CVA tenderness  Extremities: Clubbing, cyanosis, or edema  Neurological: Awake, alert, oriented x3.   No evidence of focal motor or sensory deficits except for left sided weakness secondary to prior stroke  Psychological: Depressed affect    LABS:  WBC   Date Value Ref Range Status   12/30/2020 12.2 (H) 4.5 - 11.5 E9/L Final   12/29/2020 7.2 4.5 - 11.5 E9/L Final Results valid for patients 18 years and older. GFR    Date Value Ref Range Status   12/30/2020 >60  Final   12/30/2020 >60  Final   12/30/2020 >60  Final     Magnesium   Date Value Ref Range Status   12/30/2020 2.2 1.6 - 2.6 mg/dL Final   12/30/2020 2.4 1.6 - 2.6 mg/dL Final   12/28/2020 1.9 1.6 - 2.6 mg/dL Final     Phosphorus   Date Value Ref Range Status   12/28/2020 2.6 2.5 - 4.5 mg/dL Final   11/12/2015 3.7 2.5 - 4.5 mg/dL Final   11/11/2015 3.7 2.5 - 4.5 mg/dL Final     Recent Labs     12/29/20  0759   PH 7.486*   PO2 77.5   PCO2 32.4*   HCO3 23.9   BE 0.7   O2SAT 95.2       RADIOLOGY:  XR CHEST PORTABLE   Final Result   In consideration to differences in lung volumes, no significant interval   change compared to 1 day prior. XR CHEST PORTABLE   Final Result   Continued bilateral pulmonary airspace infiltrates, with mild improved   aeration of the right lung base. US ABDOMEN LIMITED   Final Result   Mildly enlarged fatty liver. XR CHEST PORTABLE   Final Result   1. No interval change in the significant multifocal bilateral perihilar and   lower lobe infiltrates. XR CHEST PORTABLE   Final Result   Bilateral pulmonary infiltrates no significant interval change      XR CHEST PORTABLE   Final Result   Left basilar pulmonary opacity which may represent atelectasis, pneumonia,   effusion or a combination thereof. XR SHOULDER LEFT (MIN 2 VIEWS)   Final Result   No acute fracture or dislocation in the left shoulder. XR CHEST (2 VW)   Final Result   No acute cardiopulmonary process. PROBLEM LIST:  Principal Problem:    Hyponatremia  Active Problems:    CVA (cerebral vascular accident) ([address]    Diabetes mellitus ([address]  Resolved Problems:    * No resolved hospital problems. *      IMPRESSION:  1. Bilateral lung infiltrates, persistent  2. Possible aspiration pneumonitis  3.  Severe hyponatremia probably secondary to beer potomania 4. Elevated LFTs, improving  5. Alcohol abuse  6. Diabetes mellitus type 2    PLAN:  1. Change levofloxacin to daily because RYLAN has improved  2. Ativan 0.5 mg p.o. 3 times daily as needed  3. DC CIWA protocol  4. Physical and Occupational Therapy are necessary  5. Sodium and fluid management per nephrology  6.  CT chest tomorrow without contrast          Electronically signed by Alix Thibodeaux MD on 12/30/2020 at 4:01 PM

## 2020-12-30 NOTE — PROGRESS NOTES
PROGRESS NOTE    By Taylor Esqueda D.O GI Fellow    [facility]  Dr. Chandni Allen MD, Dr. Hermes Hercules MD, Dr Leo Townsend, Dr. Nancy Holden MD, Dr. Rodrigue Nails, DO      Sol Christiansen  63 y.o.  male    SUBJECTIVE:  Patient alert and oriented x3 this AM.  He denies any hematemesis or melena      OBJECTIVE:    /71   Pulse 97   Temp 96.8 °F (36 °C) (Infrared)   Resp 29   Ht 5' 10\" (1.778 m)   Wt 208 lb 1.6 oz (94.4 kg)   SpO2 91%   BMI 29.86 kg/m²     Gen: NAD, alert oriented x3  HEENT:PEERL, no icterus  Heart: RRR, no M/R/G  Lungs: CTAB  Abd.: soft, NT, ND, BS +, no G/R, no HSM  Extr.:  No lower extremity edema present patient with ecchymosis upper and lower extremities       Lab Results   Component Value Date    WBC 12.2 12/30/2020    WBC 7.2 12/29/2020    WBC 7.3 12/28/2020    HGB 7.6 12/30/2020    HGB 7.7 12/29/2020    HGB 7.4 12/29/2020    HCT 21.4 12/30/2020    MCV 82.9 12/30/2020    RDW 15.0 12/30/2020     12/30/2020     12/29/2020     12/28/2020     Lab Results   Component Value Date     12/30/2020    K 3.1 12/30/2020    K 3.3 12/30/2020    CL 90 12/30/2020    CO2 23 12/30/2020    BUN 23 12/30/2020    CREATININE 0.9 12/30/2020    CALCIUM 7.9 12/30/2020    PROT 5.2 12/30/2020    LABALBU 2.6 12/30/2020    BILITOT 1.3 12/30/2020    BILITOT 1.4 12/29/2020    BILITOT 1.9 12/28/2020    ALKPHOS 78 12/30/2020    ALKPHOS 55 12/29/2020    ALKPHOS 49 12/28/2020     12/30/2020     12/29/2020     12/28/2020     12/30/2020     12/29/2020     12/28/2020     Lab Results   Component Value Date    LIPASE 40 12/27/2020     Lab Results   Component Value Date    AMYLASE 205 12/27/2020         ASSESSMENT/PLAN:    1.   Acute on chronic microcytic anemia/possible coffee-ground emesis  - No overt hematemesis, on dopamine per critical care - Hgb 6.2 this to be transfused 1 unit of PRBC/ Type and Cross 2 units on hold/q 8 Hgb transfuse per admitting   - Continue to hold Plavix and Aspirin if feasible per admitting    - Continue with Protonix IV BID, continue with octreotide 50 MCG's per hour   -Patient high risk for endoscopic evaluation with hyponatremia    -Hold off on endoscopic evaluation at this time, with patient with no overt signs of GI bleeding  -Continue supportive care      2. Elevated Liver transaminases-trending down  -Possibly secondary to patient's hypotension ischemic hepatitis  - R Factor 17.1 indicating hepatocellular injury  -acute hepatitis panel, MERLIN, Igg all negative   - RUQ US with mildly enlarged fatty liver  - Montior with daily CMP   - Maintain adequate maps greater than 65    3. Severe Hyponatremia- improving   - Na 102 on admission   - Per nephrology         Patient with no overt signs of GI bleed on exam.  Patient's hemoglobin is remained stable over last 24 hours 7.7-->7.6 this am. Continue with Protonix and octreotide infusions. No plans for acute endoscopic evaluation with no overt signs of GI bleed patient being hyponatremic. Continue with supportive care          Pt was discussed with Dr. Marilou Stevenson DO  GI Fellow   12/30/2020  9:07 AM      Pt seen and independently examined. Pertinent notes and lab work reviewed. D/w Dr. Saint Shi with physical exam and A&P.     Fernanda Fuller MD  12/30/2020  12:01 PM

## 2020-12-30 NOTE — PROGRESS NOTES
Department of Internal Medicine  General Internal Medicine  Attending Progress Note      SUBJECTIVE:    Patient reports that he is feeling better. Denied fever or chills. Noted that he is hungry. No chest pain. Noted good night sleep.     OBJECTIVE      Medications    Current Facility-Administered Medications: dextrose 5 % solution, , Intravenous, Continuous  vitamin D (ERGOCALCIFEROL) capsule 50,000 Units, 50,000 Units, Oral, Weekly  insulin glargine (LANTUS) injection vial 24 Units, 0.25 Units/kg, Subcutaneous, Nightly  0.9 % sodium chloride bolus, 20 mL, Intravenous, Once  octreotide (SANDOSTATIN) 500 mcg in sodium chloride 0.9 % 100 mL infusion, 50 mcg/hr, Intravenous, Continuous  lidocaine PF 1 % injection 5 mL, 5 mL, Intradermal, Once  sodium chloride flush 0.9 % injection 10 mL, 10 mL, Intravenous, PRN  heparin flush 100 UNIT/ML injection 300 Units, 3 mL, Intravenous, 2 times per day  heparin flush 100 UNIT/ML injection 300 Units, 3 mL, Intracatheter, PRN  ipratropium-albuterol (DUONEB) nebulizer solution 1 ampule, 1 ampule, Inhalation, Q4H  levoFLOXacin (LEVAQUIN) 500 MG/100ML infusion 500 mg, 500 mg, Intravenous, Q48H  metronidazole (FLAGYL) 500 mg in NaCl 100 mL IVPB premix, 500 mg, Intravenous, Q8H  glucose (GLUTOSE) 40 % oral gel 15 g, 15 g, Oral, PRN  dextrose 50 % IV solution, 12.5 g, Intravenous, PRN  glucagon (rDNA) injection 1 mg, 1 mg, Intramuscular, PRN  dextrose 5 % solution, 100 mL/hr, Intravenous, PRN  pantoprazole (PROTONIX) injection 40 mg, 40 mg, Intravenous, BID  insulin lispro (HUMALOG) injection vial 0-12 Units, 0-12 Units, Subcutaneous, Q6H  DOPamine (INTROPIN) 800 mg in dextrose 5 % 250 mL infusion, 3 mcg/kg/min, Intravenous, Continuous  [Held by provider] aspirin EC tablet 81 mg, 81 mg, Oral, Daily  atorvastatin (LIPITOR) tablet 40 mg, 40 mg, Oral, Nightly  [Held by provider] clopidogrel (PLAVIX) tablet 75 mg, 75 mg, Oral, Daily thiamine mononitrate tablet 100 mg, 100 mg, Oral, Daily  sodium chloride flush 0.9 % injection 10 mL, 10 mL, Intravenous, 2 times per day  enoxaparin (LOVENOX) injection 40 mg, 40 mg, Subcutaneous, Daily  promethazine (PHENERGAN) tablet 12.5 mg, 12.5 mg, Oral, Q6H PRN **OR** ondansetron (ZOFRAN) injection 4 mg, 4 mg, Intravenous, Q6H PRN  polyethylene glycol (GLYCOLAX) packet 17 g, 17 g, Oral, Daily PRN  acetaminophen (TYLENOL) tablet 650 mg, 650 mg, Oral, Q6H PRN **OR** acetaminophen (TYLENOL) suppository 650 mg, 650 mg, Rectal, Q6H PRN  LORazepam (ATIVAN) tablet 1 mg, 1 mg, Oral, Q1H PRN **OR** LORazepam (ATIVAN) injection 1 mg, 1 mg, Intravenous, Q1H PRN **OR** LORazepam (ATIVAN) tablet 2 mg, 2 mg, Oral, Q1H PRN **OR** LORazepam (ATIVAN) injection 2 mg, 2 mg, Intravenous, Q1H PRN **OR** LORazepam (ATIVAN) tablet 3 mg, 3 mg, Oral, Q1H PRN **OR** LORazepam (ATIVAN) injection 3 mg, 3 mg, Intravenous, Q1H PRN **OR** LORazepam (ATIVAN) tablet 4 mg, 4 mg, Oral, Q1H PRN **OR** LORazepam (ATIVAN) injection 4 mg, 4 mg, Intravenous, Q1H PRN  Physical    VITALS:  /71   Pulse 97   Temp 96.8 °F (36 °C) (Infrared)   Resp 29   Ht 5' 10\" (1.778 m)   Wt 208 lb 1.6 oz (94.4 kg)   SpO2 91%   BMI 29.86 kg/m²   CONSTITUTIONAL:  awake, alert, cooperative, no apparent distress, and appears stated age  EYES:  Lids and lashes normal, pupils equal, round and reactive to light, extra ocular muscles intact, sclera clear, conjunctiva normal  ENT:  normocepalic, without obvious abnormality  NECK:  supple, symmetrical, trachea midline  HEMATOLOGIC/LYMPHATICS:  no cervical lymphadenopathy  LUNGS:  No increased work of breathing, good air exchange, clear to auscultation bilaterally, no crackles or wheezing  CARDIOVASCULAR:  normal apical pulses  ABDOMEN:  No scars, normal bowel sounds, soft, non-distended, non-tender, no masses palpated, no hepatosplenomegally MUSCULOSKELETAL:  there is no redness, warmth, or swelling of the joints  NEUROLOGIC:  Awake, alert, oriented to name, place and time. Cranial nerves II-XII are grossly intact. Motor is 5 out of 5 bilaterally. Cerebellar finger to nose, heel to shin intact. Sensory is intact. Babinski down going, Romberg negative, and gait is normal.  SKIN:  no bruising or bleeding  Data    CBC:   Lab Results   Component Value Date    WBC 12.2 12/30/2020    RBC 2.58 12/30/2020    HGB 7.6 12/30/2020    HCT 21.4 12/30/2020    MCV 82.9 12/30/2020    MCH 29.5 12/30/2020    MCHC 35.5 12/30/2020    RDW 15.0 12/30/2020     12/30/2020    MPV 10.1 12/30/2020     BMP:    Lab Results   Component Value Date     12/30/2020    K 3.1 12/30/2020    K 3.3 12/30/2020    CL 90 12/30/2020    CO2 23 12/30/2020    BUN 23 12/30/2020    LABALBU 2.6 12/30/2020    CREATININE 0.9 12/30/2020    CALCIUM 7.9 12/30/2020    GFRAA >60 12/30/2020    LABGLOM >60 12/30/2020    GLUCOSE 199 12/30/2020       ASSESSMENT AND PLAN      1. Hyponatremia:  Significantly improved since admission. Sodium now 123. Was lower than 110 at admission. Suspected to be due to beer versus SIADH. Change BMP from every 4 hours to daily. 2.  History of CVA (cerebral vascular accident) ([address] with residual left-sided weakness:  PT OT at discharge. Continue secondary prophylaxis. 3.  Diabetes mellitus ([address]  New current Lantus dosing. Accu-Chek before meals at bedtime  Diabetic diet    4. Aspiration pneumonia. Continue current antibiotics. We will treat for total of 7 days. Continue pulmonary hygiene    5. Anemia: Unsure of etiology  Concern of possible varices. GI following. Currently on octreotide. Also on PPI. 6.  Alcohol abuse:   Encourage cessation. On withdrawal protocol. Continue to monitor. 7.  Hypokalemia:  We will replace with p.o. potassium  Check magnesium  We will add one-time dose of IV potassium

## 2020-12-30 NOTE — CARE COORDINATION
12/30/2020 Negative Covid (12/26/2020). Cm transition of care:  Pt transferred to Telemetry. Call to [facility] with new referral and left a VM message. Catie Oneal from [facility] met with patient yesterday and he accepted community resource packet. Pt with [facility] Medicare complete. Waiting to see if they can accept this patient at discharge- if not will need alternate SNF choice. Attempted to reach patient by phone [phone] no answer at this time. Precert may be waived, HENS and KELLY at discharge if SNF accepts. CM/SS following.  Electronically signed by MARIA FERNANDA Rankin on 12/30/2020 at 12:47 PM

## 2020-12-30 NOTE — PLAN OF CARE
Problem: Cardiac Output - Decreased:  Goal: Hemodynamic stability will improve  Description: Hemodynamic stability will improve  Outcome: Met This Shift     Problem: Mental Status - Impaired:  Goal: Mental status will be restored to baseline  Description: Mental status will be restored to baseline  Outcome: Met This Shift     Problem: Nutrition Deficit:  Goal: Ability to achieve adequate nutritional intake will improve  Description: Ability to achieve adequate nutritional intake will improve  Outcome: Met This Shift     Problem: Serum Glucose Level - Abnormal:  Goal: Ability to maintain appropriate glucose levels will improve to within specified parameters  Description: Ability to maintain appropriate glucose levels will improve to within specified parameters  Outcome: Met This Shift

## 2020-12-31 ENCOUNTER — APPOINTMENT (OUTPATIENT)
Dept: CT IMAGING | Age: 60
DRG: 871 | End: 2020-12-31
Payer: MEDICARE

## 2020-12-31 LAB
ALBUMIN SERPL-MCNC: 2.7 G/DL (ref 3.5–5.2)
ALP BLD-CCNC: 84 U/L (ref 40–129)
ALT SERPL-CCNC: 157 U/L (ref 0–40)
ANION GAP SERPL CALCULATED.3IONS-SCNC: 11 MMOL/L (ref 7–16)
ANION GAP SERPL CALCULATED.3IONS-SCNC: 6 MMOL/L (ref 7–16)
ANION GAP SERPL CALCULATED.3IONS-SCNC: 8 MMOL/L (ref 7–16)
ANISOCYTOSIS: ABNORMAL
AST SERPL-CCNC: 101 U/L (ref 0–39)
BASOPHILS ABSOLUTE: 0.13 E9/L (ref 0–0.2)
BASOPHILS RELATIVE PERCENT: 0.9 % (ref 0–2)
BILIRUB SERPL-MCNC: 1.8 MG/DL (ref 0–1.2)
BILIRUBIN DIRECT: 0.9 MG/DL (ref 0–0.3)
BILIRUBIN, INDIRECT: 0.9 MG/DL (ref 0–1)
BLOOD CULTURE, ROUTINE: NORMAL
BUN BLDV-MCNC: 14 MG/DL (ref 8–23)
BUN BLDV-MCNC: 15 MG/DL (ref 8–23)
BUN BLDV-MCNC: 16 MG/DL (ref 8–23)
BURR CELLS: ABNORMAL
CALCIUM SERPL-MCNC: 7.7 MG/DL (ref 8.6–10.2)
CALCIUM SERPL-MCNC: 7.8 MG/DL (ref 8.6–10.2)
CALCIUM SERPL-MCNC: 7.8 MG/DL (ref 8.6–10.2)
CHLORIDE BLD-SCNC: 93 MMOL/L (ref 98–107)
CHLORIDE BLD-SCNC: 93 MMOL/L (ref 98–107)
CHLORIDE BLD-SCNC: 96 MMOL/L (ref 98–107)
CO2: 24 MMOL/L (ref 22–29)
CO2: 25 MMOL/L (ref 22–29)
CO2: 27 MMOL/L (ref 22–29)
CREAT SERPL-MCNC: 0.8 MG/DL (ref 0.7–1.2)
CREAT SERPL-MCNC: 0.9 MG/DL (ref 0.7–1.2)
CREAT SERPL-MCNC: 0.9 MG/DL (ref 0.7–1.2)
CULTURE, BLOOD 2: NORMAL
EOSINOPHILS ABSOLUTE: 0.13 E9/L (ref 0.05–0.5)
EOSINOPHILS RELATIVE PERCENT: 0.9 % (ref 0–6)
GFR AFRICAN AMERICAN: >60
GFR NON-AFRICAN AMERICAN: >60 ML/MIN/1.73
GLUCOSE BLD-MCNC: 121 MG/DL (ref 74–99)
GLUCOSE BLD-MCNC: 123 MG/DL (ref 74–99)
GLUCOSE BLD-MCNC: 162 MG/DL (ref 74–99)
HCT VFR BLD CALC: 23.9 % (ref 37–54)
HEMOGLOBIN: 8 G/DL (ref 12.5–16.5)
LYMPHOCYTES ABSOLUTE: 1.61 E9/L (ref 1.5–4)
LYMPHOCYTES RELATIVE PERCENT: 10.5 % (ref 20–42)
MCH RBC QN AUTO: 28.5 PG (ref 26–35)
MCHC RBC AUTO-ENTMCNC: 33.5 % (ref 32–34.5)
MCV RBC AUTO: 85.1 FL (ref 80–99.9)
METER GLUCOSE: 126 MG/DL (ref 74–99)
METER GLUCOSE: 141 MG/DL (ref 74–99)
METER GLUCOSE: 149 MG/DL (ref 74–99)
MONOCYTES ABSOLUTE: 1.31 E9/L (ref 0.1–0.95)
MONOCYTES RELATIVE PERCENT: 8.8 % (ref 2–12)
NEUTROPHILS ABSOLUTE: 11.53 E9/L (ref 1.8–7.3)
NEUTROPHILS RELATIVE PERCENT: 78.9 % (ref 43–80)
OSMOLALITY URINE: 439 MOSM/KG (ref 300–900)
OSMOLALITY URINE: 448 MOSM/KG (ref 300–900)
OSMOLALITY URINE: 483 MOSM/KG (ref 300–900)
OSMOLALITY URINE: 491 MOSM/KG (ref 300–900)
OSMOLALITY URINE: 541 MOSM/KG (ref 300–900)
PDW BLD-RTO: 15.9 FL (ref 11.5–15)
PLATELET # BLD: 151 E9/L (ref 130–450)
PMV BLD AUTO: 9.9 FL (ref 7–12)
POIKILOCYTES: ABNORMAL
POLYCHROMASIA: ABNORMAL
POTASSIUM REFLEX MAGNESIUM: 3.9 MMOL/L (ref 3.5–5)
POTASSIUM REFLEX MAGNESIUM: 4 MMOL/L (ref 3.5–5)
POTASSIUM REFLEX MAGNESIUM: 4.1 MMOL/L (ref 3.5–5)
RBC # BLD: 2.81 E12/L (ref 3.8–5.8)
SODIUM BLD-SCNC: 126 MMOL/L (ref 132–146)
SODIUM BLD-SCNC: 126 MMOL/L (ref 132–146)
SODIUM BLD-SCNC: 131 MMOL/L (ref 132–146)
TARGET CELLS: ABNORMAL
TOTAL PROTEIN: 5.4 G/DL (ref 6.4–8.3)
WBC # BLD: 14.6 E9/L (ref 4.5–11.5)

## 2020-12-31 PROCEDURE — C9113 INJ PANTOPRAZOLE SODIUM, VIA: HCPCS | Performed by: INTERNAL MEDICINE

## 2020-12-31 PROCEDURE — 36415 COLL VENOUS BLD VENIPUNCTURE: CPT

## 2020-12-31 PROCEDURE — 97530 THERAPEUTIC ACTIVITIES: CPT

## 2020-12-31 PROCEDURE — 94660 CPAP INITIATION&MGMT: CPT

## 2020-12-31 PROCEDURE — 1200000000 HC SEMI PRIVATE

## 2020-12-31 PROCEDURE — 82962 GLUCOSE BLOOD TEST: CPT

## 2020-12-31 PROCEDURE — 6370000000 HC RX 637 (ALT 250 FOR IP): Performed by: INTERNAL MEDICINE

## 2020-12-31 PROCEDURE — 80076 HEPATIC FUNCTION PANEL: CPT

## 2020-12-31 PROCEDURE — 6360000002 HC RX W HCPCS: Performed by: INTERNAL MEDICINE

## 2020-12-31 PROCEDURE — 71250 CT THORAX DX C-: CPT

## 2020-12-31 PROCEDURE — 2580000003 HC RX 258: Performed by: INTERNAL MEDICINE

## 2020-12-31 PROCEDURE — 97535 SELF CARE MNGMENT TRAINING: CPT

## 2020-12-31 PROCEDURE — 83935 ASSAY OF URINE OSMOLALITY: CPT

## 2020-12-31 PROCEDURE — 80048 BASIC METABOLIC PNL TOTAL CA: CPT

## 2020-12-31 PROCEDURE — 2700000000 HC OXYGEN THERAPY PER DAY

## 2020-12-31 PROCEDURE — 99232 SBSQ HOSP IP/OBS MODERATE 35: CPT | Performed by: INTERNAL MEDICINE

## 2020-12-31 PROCEDURE — 92526 ORAL FUNCTION THERAPY: CPT | Performed by: SPEECH-LANGUAGE PATHOLOGIST

## 2020-12-31 PROCEDURE — 2500000003 HC RX 250 WO HCPCS: Performed by: INTERNAL MEDICINE

## 2020-12-31 PROCEDURE — 85025 COMPLETE CBC W/AUTO DIFF WBC: CPT

## 2020-12-31 PROCEDURE — 94640 AIRWAY INHALATION TREATMENT: CPT

## 2020-12-31 RX ADMIN — ACETAMINOPHEN 650 MG: 325 TABLET, FILM COATED ORAL at 22:34

## 2020-12-31 RX ADMIN — LORAZEPAM 0.5 MG: 0.5 TABLET ORAL at 09:09

## 2020-12-31 RX ADMIN — INSULIN GLARGINE 24 UNITS: 100 INJECTION, SOLUTION SUBCUTANEOUS at 22:37

## 2020-12-31 RX ADMIN — LEVOFLOXACIN 500 MG: 5 INJECTION, SOLUTION INTRAVENOUS at 17:04

## 2020-12-31 RX ADMIN — LORAZEPAM 0.5 MG: 0.5 TABLET ORAL at 01:28

## 2020-12-31 RX ADMIN — PANTOPRAZOLE SODIUM 40 MG: 40 INJECTION, POWDER, LYOPHILIZED, FOR SOLUTION INTRAVENOUS at 09:07

## 2020-12-31 RX ADMIN — METRONIDAZOLE 500 MG: 500 INJECTION, SOLUTION INTRAVENOUS at 09:15

## 2020-12-31 RX ADMIN — IPRATROPIUM BROMIDE AND ALBUTEROL SULFATE 1 AMPULE: .5; 3 SOLUTION RESPIRATORY (INHALATION) at 06:09

## 2020-12-31 RX ADMIN — PANTOPRAZOLE SODIUM 40 MG: 40 INJECTION, POWDER, LYOPHILIZED, FOR SOLUTION INTRAVENOUS at 22:30

## 2020-12-31 RX ADMIN — INSULIN LISPRO 2 UNITS: 100 INJECTION, SOLUTION INTRAVENOUS; SUBCUTANEOUS at 17:16

## 2020-12-31 RX ADMIN — ATORVASTATIN CALCIUM 40 MG: 20 TABLET, FILM COATED ORAL at 22:30

## 2020-12-31 RX ADMIN — Medication 10 ML: at 22:32

## 2020-12-31 RX ADMIN — Medication 10 ML: at 09:09

## 2020-12-31 RX ADMIN — IPRATROPIUM BROMIDE AND ALBUTEROL SULFATE 1 AMPULE: .5; 3 SOLUTION RESPIRATORY (INHALATION) at 09:31

## 2020-12-31 RX ADMIN — ENOXAPARIN SODIUM 40 MG: 40 INJECTION SUBCUTANEOUS at 09:56

## 2020-12-31 RX ADMIN — METRONIDAZOLE 500 MG: 500 INJECTION, SOLUTION INTRAVENOUS at 18:36

## 2020-12-31 RX ADMIN — THIAMINE HCL TAB 100 MG 100 MG: 100 TAB at 09:07

## 2020-12-31 RX ADMIN — OCTREOTIDE ACETATE 50 MCG/HR: 500 INJECTION, SOLUTION INTRAVENOUS; SUBCUTANEOUS at 16:05

## 2020-12-31 RX ADMIN — METRONIDAZOLE 500 MG: 500 INJECTION, SOLUTION INTRAVENOUS at 02:35

## 2020-12-31 ASSESSMENT — PAIN DESCRIPTION - LOCATION: LOCATION: LEG

## 2020-12-31 ASSESSMENT — PAIN DESCRIPTION - ONSET: ONSET: ON-GOING

## 2020-12-31 ASSESSMENT — PAIN - FUNCTIONAL ASSESSMENT: PAIN_FUNCTIONAL_ASSESSMENT: PREVENTS OR INTERFERES WITH MANY ACTIVE NOT PASSIVE ACTIVITIES

## 2020-12-31 ASSESSMENT — PAIN DESCRIPTION - DESCRIPTORS: DESCRIPTORS: DISCOMFORT;TIGHTNESS;ACHING

## 2020-12-31 ASSESSMENT — PAIN DESCRIPTION - FREQUENCY: FREQUENCY: INTERMITTENT

## 2020-12-31 ASSESSMENT — PAIN SCALES - GENERAL
PAINLEVEL_OUTOF10: 8
PAINLEVEL_OUTOF10: 0

## 2020-12-31 NOTE — CARE COORDINATION
SS Note: Covid negative 12/26/20. [address] [facility], [address], Fax [phone], has accepted pt upon dc, SW met and updated pt who verbalized agreement. IF PT DC ON BIPAP PLEASE SEND HOSPITAL MASK WITH PT. SNF is exploring whether PRECERT is needed or if it can be waived. HENS Exemption form completed. Electronic KELLY in pt's EPIC Chart for physician signature. Electronically signed by ANGELO De La Cruz on 12/31/2020 at 11:53 AM      Addendum: SW received call from [facility], stated when she called insurance [facility] Medicare advised pt insurance expires today. ABHISHEK met with pt and he stated he did not change his insurance for 2021. ABHISHEK spoke with Codie Vo, [facility], and she is exploring benefit issue. Electronically signed by ANGELO De La Cruz on 12/31/2020 at 12:48 PM    Addendum: Per [address], she investigated and pt's [facility] Medicare still appears to be in effect for 2021. ABHISHEK called and faxed insurance information received from Codie Vo to [address], liaison stated she has left work for today however will work half day tomorrow/New Years Day and will verify pt's benefits again.   Electronically signed by ANGELO De La Cruz on 12/31/2020 at 1:11 PM

## 2020-12-31 NOTE — DISCHARGE INSTR - COC
Continuity of Care Form    Patient Name: Lena Mendez   :  1960  MRN:  [id number]    Admit date:  2020  Discharge date:  21    Code Status Order: Full Code   Advance Directives:   [address] [facility] Documentation       Date/Time Healthcare Directive Type of Healthcare Directive Copy in [facility] [address] Agent's Name Healthcare Agent's Phone Number    20 [phone]  No, patient does not have an advance directive for healthcare treatment -- -- -- -- --            Admitting Physician:  Otoniel Aquino DO  PCP: Mildred Espinosa MD    Discharging Nurse: [facility] Unit/Room#: 8933/3356-58  Discharging Unit Phone Number: [phone]    Emergency Contact:   Extended Emergency Contact Information  Primary Emergency Contact: [facility] [address] Phone: [phone]  Mobile Phone: [phone]  Relation: Brother/Sister   needed? No  Secondary Emergency Contact: [facility] Phone: [phone]  Mobile Phone: [phone]  Relation: Parent   needed? No    Past Surgical History:  Past Surgical History:   Procedure Laterality Date    FINGER AMPUTATION      KNEE ARTHROSCOPY      TONSILLECTOMY         Immunization History: There is no immunization history on file for this patient.     Active Problems:  Patient Active Problem List   Diagnosis Code    CVA (cerebral vascular accident) ([facility] I63.9    Diabetes mellitus ([facility] E11.9    Hyponatremia E87.1       Isolation/Infection:   Isolation            No Isolation          Patient Infection Status       Infection Onset Added Last Indicated Last Indicated By Review Planned Expiration Resolved Resolved By    None active    Resolved    COVID-19 Rule Out 20 COVID-19 (Ordered)   20 Rule-Out Test Resulted            Nurse Assessment: Oxygen Therapy:  is on oxygen at 5 L/min per nasal cannula. Ventilator:    - No ventilator support    Rehab Therapies: Physical Therapy and Occupational Therapy  Weight Bearing Status/Restrictions: No weight bearing restirctions  Other Medical Equipment (for information only, NOT a DME order):  wheelchair and hospital bed  Other Treatments:     Patient's personal belongings (please select all that are sent with patient):  None    RN SIGNATURE:  Electronically signed by Leni Taylor RN on 1/5/21 at 11:56 AM EST    CASE MANAGEMENT/SOCIAL WORK SECTION    Inpatient Status Date: 12/26/2020    Readmission Risk Assessment Score:  Readmission Risk              Risk of Unplanned Readmission:        19           Discharging to Facility/ Agency   Name: [facility], [address], Fax [phone],      Dialysis Facility (if applicable)   Name:  Address:  Dialysis Schedule:  Phone:  Fax:    / signature: Electronically signed by ANGELO Nunez on 12/31/2020 at 11:56 AM      PHYSICIAN SECTION    Prognosis: Good    Condition at Discharge: Stable    Rehab Potential (if transferring to Rehab): Good    Recommended Labs or Other Treatments After Discharge: bmp q week x 3 weeks  Fluid restrict to 1500 ml per day    Physician Certification: I certify the above information and transfer of Sol Christiansen  is necessary for the continuing treatment of the diagnosis listed and that he requires [facility] for less 30 days.      Update Admission H&P: No change in H&P    PHYSICIAN SIGNATURE:  Electronically signed by Alexys Winchester MD on 1/5/21 at 2:55 PM EST

## 2020-12-31 NOTE — PROGRESS NOTES
OT BEDSIDE TREATMENT NOTE      Date:2020  Patient Name: Margareth Man  MRN: [id number]  : 1960  Room: [address] [place] [address]     Referring Provider: Bhanu Wise MD     Evaluating OT: Marnie Batista OTR/L #[id number]     AM-PAC Daily Activity Raw Score:   Tentative Placement Recommendation: TALYA     Recommended Adaptive Equipment/DME: To be further assessed       Diagnosis:    1. Hyponatremia    2. Frequent falls           Patient Active Problem List   Diagnosis    [facility]re[facility]) [facility]    Diabetes mellitus ([facility]    Hyponatremia      Pertinent Medical History:   Past Medical History        Past Medical History:   Diagnosis Date    Cerebral artery occlusion with cerebral infarction ([facility]      Diabetes mellitus ([facility]       Type 2    Hypertension           Precautions:  Falls, bed alarm, O2, L UE flaccid, LLE weakness, hx of CVA 6 years ago     Home Living: Pt lives with brother (who has MS) and mother in a 2 story with 3 step(s) to enter and 1 rail(s); bed/bath on 1st   Bathroom setup: tub shower grab bars  Equipment owned: cane     Prior Level of Function:  Independent  with ADLs , Independent  with IADLs; using cane for ambulation.    Driving: yes         Pain Level: left groin unquantified  Cognition: A&O: 4/4;   Follows 2 step directions: fair               Memory:  fair               Sequencing:  fair               Problem solving:  fair               Judgement/safety:  fair                 Functional Assessment:    Initial Eval Status  Date: 20 Treatment Status  Date:2020 STG=LTG  Time frame: 5-7 days   Feeding Minimal Assist   Min A to hold cup as pt drank water Independent Grooming Maximal Assist  Mod A; pt required assist to don shampoo cap; cuing to use R UE to wash and towel dry hair; pt required assist to comb hair; max a to manage containers and place toothpaste onto toothbrush; pt able to bring toothbrush to mouth and complete oral hygiene when seated in bed with HOB elevated Minimal Assist    UB Dressing Maximal Assist  Max A to change gowns; pt required assist to lift LUE for donning /doffing gown; max A to fasten gown d/t L UE flaccidity  Minimal Assist    LB Dressing Maximal Assist  Max A to doff socks and don heelbos  Minimal Assist    Bathing Maximal Assist  Simulated sitting EOB Mod A; pt able to wash face, UB with mod A d/t flaccidity of L UE; max a to wash LE's, back and buttock region  Minimal Assist    Toileting Dependent   N/T Moderate Assist    Bed Mobility  Supine to sit: Moderate Assist   Sit to supine:  Moderate Assist   Pt seated EOB with nsg and PT on arrival; Max A x 2 for sit to supine; assist to guide UB and LE's to supine; max A x 2 to scoot to [facility] with use of TAPS system as chux pad; max A for side rolling to place TAPS system and change chux pads Supine to sit: Supervision   Sit to supine: Supervision    Functional Transfers Sit to stand: Maximal Assist   Stand to sit: Maximal Assist   Stand pivot: NT  N/T as pt refused d/t weakness in LE's, as well as fear of falling  Supervision    Functional Mobility NT     N/T Min A with AAD  Short distance   Balance Sitting:     Static:  SBA    Dynamic:mod A  Standing: mod /max A Sitting:     Static:  Min A progressing to mod A d/t fatigue    Dynamic:N/T  Standing: N/T Sitting:     Static:  indep    Dynamic:indep  Standing: min A with AAD   Activity Tolerance poor Fair minus  fair   Visual/  Perceptual Glasses: yes     glasses        BUE  ROM/Strength/  Fine motor Coordination Hand dominance: R     RUE: ROM 90 shoulder flex, WFL distally     Strength: grossly 3/5      Strength: fair     Coordination:  fair    LUE: ROM minimal shoulder flex, partial elbow flex, finger flex/ext     Strength: grossly 2+/5      Strength: poor     Coordination: poor   Increase BUE strength 1/3 muscle grade for  Improved indep with functional transfers        Comments: Patient cleared by nursing staff. Upon arrival pt seated EOB with PT and Nsg. Pt agreeable to OT tx session. Pt educated with regards to bed mobility, hand placement, safety awareness, static sitting balance,  ADL retraining, grooming tasks, UE/LE bathing, LE/UE dressing, ECT's. At end of session pt seated in bed with HOB elevated; all lines and tubes intact, call light within reach. Overall, pt demonstrated decreased independence and safety during completion of ADL/functional transfers/mobility tasks. Pt coughing throughout session. Pt would benefit from continued skilled OT to increase safety and independence with completion of ADL/IADL tasks for functional independence and quality of life. Pt required cues and education as noted above for safe facilitation and completion of tasks. Therapist provided skilled monitoring of patient's response during treatment session. Prior to and at the end of session, environmental modifications /line management completed for patients safety and efficiency of treatment session. Overall, patient demonstrates moderate difficulties with completion of BADLs and IADLs. Factors contributing to these difficulties include hx of CVA with L UE flaccidity, LLE weakness, decreased endurance, and generalized weakness. As noted above, patient likely to benefit from further OT intervention to increase independence, safety, and overall quality of life. Treatment:     ? Bed mobility: Facilitated bed mobility with cues for proper body mechanics and sequencing to prepare for ADL completion. ? ADL completion: Self-care retraining for the above-mentioned ADLs; training on proper hand placement, safety technique, sequencing, and energy conservation techniques. ? Postural Balance: Sitting balance retraining to improve righting reactions with postural changes during ADLs. ?  Skilled positioning: Proper positioning to improve interaction with environment, overall functioning and decrease/prevent edema in LE's and L UE.    · Pt has made fair minus progress towards set goals   ·  OT 1-3x/week for 5-7 days during hospitalization       Treatment Time In: 1:45 PM     Treatment Time Out: 2:17 PM            Treatment Charges: Mins Units   ADL/Home Mgt     10162 25 2   Thera Activities     83326 5 0   Ther Ex                 97012     Manual Therapy    54703     Neuro Re-ed         35357     Orthotic manage/training                               36044     Non Billable Time 2    Total Timed Treatment 32-2=30 [address]

## 2020-12-31 NOTE — PROGRESS NOTES
Speech Language Pathology      NAME:  Leigh Zaldivar  :  1960  DATE: 2020  ROOM:  1497/8199-68    Patient seen for swallow therapy 15 minutes. Consistent coughing on thin today with self administered cup sips and straw sips. Recommend change diet to   Dysphagia 2,  Mechanical Soft (Minced & Moist) solids with  nectar consistency (mildly thick) liquids left message for nursing regarding diet change   .     Hyponatremia [E87.1]    10484  dysphagia tx    Vicky Erazo MSCCC/SLP  Speech Language Pathologist  [id number]

## 2020-12-31 NOTE — PROGRESS NOTES
- Continue to hold Plavix and Aspirin if feasible per admitting    - Continue with Protonix IV BID, continue with octreotide 50 MCG's per hour   -Patient high risk for endoscopic evaluation with hyponatremia    -Hold off on endoscopic evaluation at this time, with patient with no overt signs of GI bleeding  -Continue supportive care      2. Elevated Liver transaminases-trending down  -Possibly secondary to patient's hypotension ischemic hepatitis  - R Factor 17.1 indicating hepatocellular injury  -acute hepatitis panel, MERLIN, Igg all negative   - RUQ US with mildly enlarged fatty liver  - Montior with daily CMP   - Maintain adequate maps greater than 65    3. Severe Hyponatremia- improving   - Na 102 on admission   - Per nephrology         Patient with no overt signs of GI bleed on exam.  Patient's hemoglobin is remained stable over last 24 hours 7.7-->7.6 this am. Continue with Protonix and octreotide infusions. No plans for acute endoscopic evaluation with no overt signs of GI bleed patient being hyponatremic. Continue with supportive care          Pt was discussed with Dr. Lakhwinder Carrillo DO  GI Fellow   12/31/2020  8:20 AM    Pt seen and independently examined. Pertinent notes and lab work reviewed. Monitor reviewed showing AF. D/w Dr. Juvenal More with physical exam and A&P.   Discussed with patient - all questions answered - agreeable with the plan as delineated    Benjy Villarreal MD  12/31/2020  12:06 PM

## 2020-12-31 NOTE — PROGRESS NOTES
Physical Therapy    Physical Therapy Treatment Note    Room #:  7502/3314-71  Patient Name: Lena Mendez  YOB: 1960  MRN: [id number]    Referring Provider:   Otoniel Aquino DO      Date of Service: 12/31/2020    Evaluating Physical Therapist: Hanane Caldwell, PT  #[id number]       Diagnosis:   Hyponatremia [E87.1]   Admitted with   Falls and shortness of breath , bruising noted on right distal knee ; nursing made aware    Patient Active Problem List   Diagnosis    CVA (cerebral vascular accident) ([address]    Diabetes mellitus ([facility] [facility]    Hyponatremia        Tentative placement recommendation: Subacute rehab    Equipment recommendation: Patient has needed equipment       Prior Level of Function: Patient ambulated with cane    Rehab Potential: good  for baseline    Past medical history:   Past Medical History:   Diagnosis Date    Cerebral artery occlusion with cerebral infarction [facility] [facility]     Diabetes mellitus ([facility] [address]     Type 2    Hypertension      Past Surgical History:   Procedure Laterality Date    FINGER AMPUTATION      KNEE ARTHROSCOPY      TONSILLECTOMY         Precautions: Up as tolerated, falls, alarm and O2 ,  2 Liters of o2 via nasal cannula     SUBJECTIVE:    Social history: Patient lives with family brother who has ms and mother in a two story home 3  with 3 steps  to enter with Rail  Tub shower grab Veoh    Equipment owned: Curt Vidal    [address]   How much difficulty turning over in bed?: A Lot  How much difficulty sitting down on / standing up from a chair with arms?: A Lot  How much difficulty moving from lying on back to sitting on side of bed?: A Lot  How much help from another person moving to and from a bed to a chair?: A Lot  How much help from another person needed to walk in hospital room?: Total  How much help from another person for climbing 3-5 steps with a railing?: Total  AM-PAC Inpatient Mobility Raw Score : 10 AM-PAC Inpatient T-Scale Score : 32.29  Mobility Inpatient CMS 0-100% Score: 76.75  Mobility Inpatient CMS G-Code Modifier : CL    Nursing cleared patient for PT treatment. OBJECTIVE;   Initial Evaluation  Date: 12/29/20 Treatment Date:  12/31/2020       Short Term/ Long Term   Goals   Was pt agreeable to Eval/treatment? Yes   yes To be met in 3 days   Pain level   8/10  left groin  0/10    Bed Mobility    Rolling: Moderate assist of 1    Supine to sit: Moderate assist of 1    Sit to supine: Moderate assist of 1    Scooting:  Moderate assist of 1   Rolling: Maximal assist of 1   Supine to sit: Maximal assist of 1   Sit to supine: Maximal assist of  2   Scooting: Maximal assist of 1    Rolling: Supervision     Supine to sit: Supervision     Sit to supine: Supervision     Scooting: Supervision      Transfers Sit to stand: Maximal assist of 1   Sit to stand: Not assessed  attempted but not successful     Sit to stand: Supervision      Ambulation    not assessed    not assessed     50 feet using  cane with Supervision     Stair negotiation: ascended and descended   Not assessed       3 steps with rail supervision    ROM Within functional limits with exception of right shoulder     Increase range of motion 10% of affected joints    Strength LUE:  1/5  R UE:  3/5  RLE:  3/5  LLE:  2/5   Increase strength in affected mm groups by 1/3 grade   Balance Sitting EOB:  fair    Dynamic Standing:  poor   Sitting EOB: poor left lateral/posterior lean  Dynamic Standing: not assessed    Sitting EOB:  good    Dynamic Standing: fair       Patient is Alert & Oriented x person, place and situation and follows one step directions    Sensation:  Patient  reports numbness and tingling left lower extremity    Edema:  yes left upper extremity    Endurance: poor      Vitals: 2 Liters of o2 via nasal cannula   Blood Pressure at rest  Blood Pressure during session    Heart Rate at rest  Heart Rate during session SPO2 at rest %  SPO2 during session %     Patient education  Patient educated on role of Physical Therapy, risks of immobility, safety and plan of care, energy conservation and  importance of mobility while in hospital       Patient response to education:   Pt verbalized understanding Pt demonstrated skill Pt requires further education in this area   Yes Partial Yes      Treatment:  Patient practiced and was instructed/facilitated in the following treatment: Patient   Sat edge of bed 15 minutes with Supervision  to increase dynamic sitting balance and activity tolerance. Worked on sitting balance trying to maintain mid-line position. Pt asked me to put on his shoes, while doing so, he would lean posteriorly. I put the bed rail up in order for him to hold onto it with his right hand. Attempted 1 sit to stand, he did not get off of the bed. Tried to convince him to try again with nursing and MOORE but he refused due to dizziness. Pt requested to lay back down, returned to supine. Therapeutic Exercises:  not performed         At end of session, patient in bed with alarm call light and phone within reach,   all lines and tubes intact, nursing notified. Patient would benefit from continued skilled Physical Therapy to improve functional independence and quality of life. Patient's/ family goals   rehab        ASSESSMENT: Patient exhibits decreased strength, balance, coordination impairing functional mobility. Decreased motor control on Left lower extremity , swelling Left upper extremitiy  And overall endurance/strength deficits are barriers to d/c and require skilled intervention during hospital stay and  impacts safe mobility and  patient requires mod/max  assist with mobility, continues to require skilled intervention to progress activity to monitor vital signs and response to activity.        Plan of Care: -Bed Mobility: Lower extremity exercises , Upper extremity exercises  and Trunk control activities   -Sitting Balance: Incorporate reaching activities to activate trunk muscles   -Standing Balance: Perform strengthening exercises in standing to promote motor control with or without upper extremity support  and Instruct patient on adequate base of support to maintain balance  -Transfers: Provide instruction on proper hand and foot position for adequate transfer of weight onto lower extremities and use of gait device, Cues for hand placement, technique and safety, Facilitate weight shift forward on to lower extremities and provide necessary stabilization of bilateral lower extremities , Support transfer of weight on to lower extremities and Provide stabilization to prevent fall   -Gait: Gait training, Standing activities to improve: base of support, weight shift, weight bearing  and Exercises to improve hip and knee control   -Endurance: Utilize Supervised activities to increase level of endurance to allow for safe functional mobility including transfers and gait     Patient and or family understand(s) diagnosis, prognosis, and plan of care. Frequency of treatments: Patient will be seen  3-5 /week . Time in  1:30  Time out  1:47  Total Treatment Time  17 minutes        CPT codes:    Therapeutic activities (52445)   17 minutes  1 unit(s)    Carlie Stoddard.  Sylvain  [facility]  LIC # [id number]

## 2020-12-31 NOTE — PROGRESS NOTES
Department of Internal Medicine  General Internal Medicine  Attending Progress Note      SUBJECTIVE:    Patient reported that he is doing okay denied fever and chills.     OBJECTIVE      Medications    Current Facility-Administered Medications: dextrose 5 % solution, , Intravenous, Continuous  LORazepam (ATIVAN) tablet 0.5 mg, 0.5 mg, Oral, TID PRN  levoFLOXacin (LEVAQUIN) 500 MG/100ML infusion 500 mg, 500 mg, Intravenous, Q24H  vitamin D (ERGOCALCIFEROL) capsule 50,000 Units, 50,000 Units, Oral, Weekly  insulin glargine (LANTUS) injection vial 24 Units, 0.25 Units/kg, Subcutaneous, Nightly  0.9 % sodium chloride bolus, 20 mL, Intravenous, Once  octreotide (SANDOSTATIN) 500 mcg in sodium chloride 0.9 % 100 mL infusion, 50 mcg/hr, Intravenous, Continuous  lidocaine PF 1 % injection 5 mL, 5 mL, Intradermal, Once  sodium chloride flush 0.9 % injection 10 mL, 10 mL, Intravenous, PRN  heparin flush 100 UNIT/ML injection 300 Units, 3 mL, Intravenous, 2 times per day  heparin flush 100 UNIT/ML injection 300 Units, 3 mL, Intracatheter, PRN  ipratropium-albuterol (DUONEB) nebulizer solution 1 ampule, 1 ampule, Inhalation, Q4H  metronidazole (FLAGYL) 500 mg in NaCl 100 mL IVPB premix, 500 mg, Intravenous, Q8H  glucose (GLUTOSE) 40 % oral gel 15 g, 15 g, Oral, PRN  dextrose 50 % IV solution, 12.5 g, Intravenous, PRN  glucagon (rDNA) injection 1 mg, 1 mg, Intramuscular, PRN  dextrose 5 % solution, 100 mL/hr, Intravenous, PRN  pantoprazole (PROTONIX) injection 40 mg, 40 mg, Intravenous, BID  insulin lispro (HUMALOG) injection vial 0-12 Units, 0-12 Units, Subcutaneous, Q6H  [Held by provider] aspirin EC tablet 81 mg, 81 mg, Oral, Daily  atorvastatin (LIPITOR) tablet 40 mg, 40 mg, Oral, Nightly  [Held by provider] clopidogrel (PLAVIX) tablet 75 mg, 75 mg, Oral, Daily  thiamine mononitrate tablet 100 mg, 100 mg, Oral, Daily  sodium chloride flush 0.9 % injection 10 mL, 10 mL, Intravenous, 2 times per day enoxaparin (LOVENOX) injection 40 mg, 40 mg, Subcutaneous, Daily  promethazine (PHENERGAN) tablet 12.5 mg, 12.5 mg, Oral, Q6H PRN **OR** ondansetron (ZOFRAN) injection 4 mg, 4 mg, Intravenous, Q6H PRN  polyethylene glycol (GLYCOLAX) packet 17 g, 17 g, Oral, Daily PRN  acetaminophen (TYLENOL) tablet 650 mg, 650 mg, Oral, Q6H PRN **OR** acetaminophen (TYLENOL) suppository 650 mg, 650 mg, Rectal, Q6H PRN  Physical    VITALS:  BP (!) 148/67   Pulse 83   Temp 98.3 °F (36.8 °C) (Oral)   Resp 20   Ht 5' 10\" (1.778 m)   Wt 208 lb 1.6 oz (94.4 kg)   SpO2 93%   BMI 29.86 kg/m²   CONSTITUTIONAL:  awake, alert, cooperative, no apparent distress, and appears stated age  EYES:  Lids and lashes normal, pupils equal, round and reactive to light, extra ocular muscles intact, sclera clear, conjunctiva normal  ENT:  normocepalic, without obvious abnormality  NECK:  supple, symmetrical, trachea midline  HEMATOLOGIC/LYMPHATICS:  no cervical lymphadenopathy  BACK:  symmetric  LUNGS:  No increased work of breathing, good air exchange, clear to auscultation bilaterally, no crackles or wheezing  CARDIOVASCULAR:  normal apical pulses  ABDOMEN:  No scars, normal bowel sounds, soft, non-distended, non-tender, no masses palpated, no hepatosplenomegally  MUSCULOSKELETAL:  There is no redness, warmth, or swelling of the joints. Full range of motion noted. Motor strength is 5 out of 5 all extremities bilaterally. Tone is normal.  NEUROLOGIC:  Awake, alert, oriented to name, place and time. Cranial nerves II-XII are grossly intact. Motor is 5 out of 5 bilaterally. Cerebellar finger to nose, heel to shin intact. Sensory is intact.   Babinski down going, Romberg negative, and gait is normal.  SKIN:  no bruising or bleeding  Data    CBC:   Lab Results   Component Value Date    WBC 14.6 12/31/2020    RBC 2.81 12/31/2020    HGB 8.0 12/31/2020    HCT 23.9 12/31/2020    MCV 85.1 12/31/2020    MCH 28.5 12/31/2020    MCHC 33.5 12/31/2020 RDW 15.9 12/31/2020     12/31/2020    MPV 9.9 12/31/2020     BMP:    Lab Results   Component Value Date     12/31/2020     12/31/2020    K 4.1 12/31/2020    K 4.0 12/31/2020    CL 96 12/31/2020    CL 93 12/31/2020    CO2 24 12/31/2020    CO2 25 12/31/2020    BUN 15 12/31/2020    BUN 14 12/31/2020    LABALBU 2.7 12/31/2020    CREATININE 0.9 12/31/2020    CREATININE 0.8 12/31/2020    CALCIUM 7.8 12/31/2020    CALCIUM 7.7 12/31/2020    GFRAA >60 12/31/2020    GFRAA >60 12/31/2020    LABGLOM >60 12/31/2020    LABGLOM >60 12/31/2020    GLUCOSE 123 12/31/2020    GLUCOSE 121 12/31/2020       ASSESSMENT AND PLAN      1. Hyponatremia:  Very much improved. Last sodium is 131. We will discontinue every 4 hours BMP. None would do daily. Hyponatremia was suspected to be due to SIADH versus beer portomania    2. History of CVA (cerebral vascular accident) with left side residual weakness:  PT OT  Discharge to rehab    3. Diabetes mellitus ([address]  Good control continue current insulin dosing    4. Pneumonia:  Continue current antibiotics  Possible 7 days of antibiotics should be able to cover this is being treated as aspiration pneumonia. 5.  Anemia: Etiology is unknown. GI following  Being treated with octreotide  Defer management of octreotide to GI. Discharge should be to rehab for continued PT OT    Patient has right-sided inguinal hernia.

## 2020-12-31 NOTE — PROGRESS NOTES
Nephrology Progress Note    12/31/20: Patient seen and examined. Resting in bed, no complaints    Vital SignsBP (!) 148/67   Pulse 83   Temp 98.3 °F (36.8 °C) (Oral)   Resp 20   Ht 5' 10\" (1.778 m)   Wt 208 lb 1.6 oz (94.4 kg)   SpO2 93%   BMI 29.86 kg/m²   24HR INTAKE/OUTPUT:      Intake/Output Summary (Last 24 hours) at 12/31/2020 1109  Last data filed at 12/31/2020 0837  Gross per 24 hour   Intake 100 ml   Output 2050 ml   Net -1950 ml     Physical Exam    Neck: No JVD  Lungs: Breath sounds decreased at the bases. A few expiratory rhonchi present. Heart: Regular rate and rhythm. No S3 gallop. No  murmrur. Abdomen: Soft non distended, non tender and normal bowel sounds. Extremeties: No edema.     ROS:  RESPIRATORY: (-) for shortness of breath  CARDIOVASCULAR:  negative  GASTROINTESTINAL:  negative  GENITOURINARY:  negative    Current Facility-Administered Medications   Medication Dose Route Frequency Provider Last Rate Last Admin    dextrose 5 % solution   Intravenous Continuous Erich Marinelli MD 50 mL/hr at 12/30/20 6240 New Bag at 12/30/20 8184    LORazepam (ATIVAN) tablet 0.5 mg  0.5 mg Oral TID PRN Gloria Noel MD   0.5 mg at 12/31/20 7774    levoFLOXacin (LEVAQUIN) 500 MG/100ML infusion 500 mg  500 mg Intravenous Q24H Gloria Noel MD   Stopped at 12/30/20 1807    vitamin D (ERGOCALCIFEROL) capsule 50,000 Units  50,000 Units Oral Weekly Erich Marinelli MD   50,000 Units at 12/29/20 1605    insulin glargine (LANTUS) injection vial 24 Units  0.25 Units/kg Subcutaneous Nightly Gloria Noel MD   24 Units at 12/30/20 2147    0.9 % sodium chloride bolus  20 mL Intravenous Once Damir Valero DO        octreotide (SANDOSTATIN) 500 mcg in sodium chloride 0.9 % 100 mL infusion  50 mcg/hr Intravenous Continuous Robi Laundry, DO 10 mL/hr at 12/30/20 0411 50 mcg/hr at 12/30/20 0411    lidocaine PF 1 % injection 5 mL  5 mL Intradermal Once Gloria Noel MD  sodium chloride flush 0.9 % injection 10 mL  10 mL Intravenous PRN Hamzah Díaz MD        heparin flush 100 UNIT/ML injection 300 Units  3 mL Intravenous 2 times per day Hamzah Díaz MD        heparin flush 100 UNIT/ML injection 300 Units  3 mL Intracatheter PRN Hamzah Díaz MD        ipratropium-albuterol (DUONEB) nebulizer solution 1 ampule  1 ampule Inhalation Q4H Hamzah Díaz MD   1 ampule at 12/31/20 0931    metronidazole (FLAGYL) 500 mg in NaCl 100 mL IVPB premix  500 mg Intravenous Q8H Hamzah Díaz MD   Stopped at 12/31/20 0955    glucose (GLUTOSE) 40 % oral gel 15 g  15 g Oral PRN Hamzah Díaz MD        dextrose 50 % IV solution  12.5 g Intravenous PRN Hamzah Díaz MD        glucagon (rDNA) injection 1 mg  1 mg Intramuscular PRN Hamzah Díaz MD        dextrose 5 % solution  100 mL/hr Intravenous PRN Hamzah Díaz MD        pantoprazole (PROTONIX) injection 40 mg  40 mg Intravenous BID Yen Mitchell DO   40 mg at 12/31/20 8340    insulin lispro (HUMALOG) injection vial 0-12 Units  0-12 Units Subcutaneous Q6H Hamzah Díaz MD   2 Units at 12/30/20 0533    DOPamine (INTROPIN) 800 mg in dextrose 5 % 250 mL infusion  3 mcg/kg/min Intravenous Continuous Hamzah Díaz MD   Stopped at 12/28/20 1700    [Held by provider] aspirin EC tablet 81 mg  81 mg Oral Daily Martinez Walters MD   81 mg at 12/26/20 0902    atorvastatin (LIPITOR) tablet 40 mg  40 mg Oral Nightly Martinez Walters MD   40 mg at 12/30/20 2144    [Held by provider] clopidogrel (PLAVIX) tablet 75 mg  75 mg Oral Daily Martinez Walters MD   75 mg at 12/26/20 0902    thiamine mononitrate tablet 100 mg  100 mg Oral Daily Martinez Walters MD   100 mg at 12/31/20 0907    sodium chloride flush 0.9 % injection 10 mL  10 mL Intravenous 2 times per day Prateek Marquez MD   10 mL at 12/31/20 0909    enoxaparin (LOVENOX) injection 40 mg  40 mg Subcutaneous Daily Tajul Synagogue, MD   40 mg at 12/31/20 3342  promethazine (PHENERGAN) tablet 12.5 mg  12.5 mg Oral Q6H PRN Martinez Walters MD        Or    ondansetron (ZOFRAN) injection 4 mg  4 mg Intravenous Q6H PRN Martinez Walters MD   4 mg at 12/26/20 0606    polyethylene glycol (GLYCOLAX) packet 17 g  17 g Oral Daily PRN Martinez Walters MD        acetaminophen (TYLENOL) tablet 650 mg  650 mg Oral Q6H PRN Martinez Walters MD        Or    acetaminophen (TYLENOL) suppository 650 mg  650 mg Rectal Q6H PRN Martinez Walters MD   650 mg at 12/26/20 2325       CT CHEST WO CONTRAST   Final Result   Scattered, patchy ground-glass and consolidative airspace opacities   throughout both lungs, most pronounced in the lower lobes bilaterally. Small   bilateral pleural effusions are present as well. Findings are consistent   with multifocal pneumonia. Viral/atypical pneumonia not excluded. Hepatic steatosis. Small volume of free fluid visualized along the anterior pararenal space   bilaterally. This is of uncertain etiology or clinical significance. Consider correlation with serum lipase levels. CT of the abdomen and pelvis   could be considered as well, if clinically appropriate. XR CHEST PORTABLE   Final Result   In consideration to differences in lung volumes, no significant interval   change compared to 1 day prior. XR CHEST PORTABLE   Final Result   Continued bilateral pulmonary airspace infiltrates, with mild improved   aeration of the right lung base. US ABDOMEN LIMITED   Final Result   Mildly enlarged fatty liver. XR CHEST PORTABLE   Final Result   1. No interval change in the significant multifocal bilateral perihilar and   lower lobe infiltrates. XR CHEST PORTABLE   Final Result   Bilateral pulmonary infiltrates no significant interval change      XR CHEST PORTABLE   Final Result   Left basilar pulmonary opacity which may represent atelectasis, pneumonia,   effusion or a combination thereof.       XR SHOULDER LEFT (MIN 2 VIEWS)   Final Result No acute fracture or dislocation in the left shoulder. XR CHEST (2 VW)   Final Result   No acute cardiopulmonary process. Labs:    CBC:   Recent Labs     12/29/20  0630 12/29/20  1150 12/29/20  2110 12/30/20  0430   WBC 7.2  --   --  12.2*   HGB 7.3* 7.4* 7.7* 7.6*   *  --   --  149        Lab Results   Component Value Date    IRON 17 (L) 12/28/2020    TIBC 232 (L) 12/28/2020    FERRITIN 125 12/28/2020       Lab Results   Component Value Date    PTH 60 12/28/2020    CALCIUM 7.8 (L) 12/30/2020    CALCIUM 7.6 (L) 12/30/2020    CALCIUM 8.0 (L) 12/30/2020    CAION 1.16 12/30/2020    CAION 1.17 12/29/2020    CAION 1.13 (L) 12/28/2020    PHOS 2.6 12/28/2020    PHOS 3.7 11/12/2015    PHOS 3.7 11/11/2015    MG 2.0 12/30/2020    MG 2.2 12/30/2020    MG 2.4 12/30/2020       BMP:   Recent Labs     12/30/20  0835 12/30/20  1739 12/30/20  2333   * 123* 126*   K 3.1* 3.4* 3.9   CL 91* 90* 93*   CO2 25 25 27   BUN 21 18 16   CREATININE 0.9 0.8 0.9   GLUCOSE 150* 184* 162*     Assessment: / Plan:    1. Severe hyponatremia. Hyponatremia believed to be secondary to multiple factors including thiazide diuretic use, significant history of alcohol abuse in the form of beer limiting free water excretion due to low solute load as well as possibility of underlying SIADH. S/P 1 dose dDAVP on 12/28 at 2300  Na+ up to 126 this AM  PLAN:1.Follow Na+  2. Follow Urine osm    2. Acute kidney injury. FeNa <1% suggesting possible component of decreased effective renal perfusion in the setting of the hypotension  Cr down to 0.9mg/dl  UO 1850mL in last 24 hours  PLAN:1. Continue to monitor    3. Metabolic acidosis with increased anion gap. Patient with a lactic acidosis possible Type A secondary to tissue hypoperfusion as well as a possible Type B due to  use of Metformin. Lactic Acid down from 6.3-->2.5-->2.2=>Resolved  PLAN:1. Continue off Metformin     4. Hypotension.  Resolved  PLAN:1.Monitor 5.  Microcytic Anemia. Hgb down to 6.2-->7.3->7.6 S/P 1 unit PRBC 12/28/20  Ferritin 125 with an iron sat 7%  PLAN:1. Would treat with IV Fe++ once infection cleared and pt off antibx    6. Hypocalcemia. In the setting of hypoalbumionemia   Ionized Ca++ 1.16 WNL  PTH 60  Unspecified Vit D Def  With Vit D 12  PLAN:1.Continue Vit D supplement    Electronically signed by ALBERT Mulligan - NP   Pt seen and examined-awake alert and appropriate-tolerating po-no new complaints  All Labs reviewed  A/P:  1.  Hyponatremia-Na+ continues to trend up0-await the 1300 labs    Agree with the remainder as above    AGUEDA Chavira MD

## 2021-01-01 LAB
ALBUMIN SERPL-MCNC: 2.5 G/DL (ref 3.5–5.2)
ALP BLD-CCNC: 79 U/L (ref 40–129)
ALT SERPL-CCNC: 105 U/L (ref 0–40)
ANION GAP SERPL CALCULATED.3IONS-SCNC: 5 MMOL/L (ref 7–16)
AST SERPL-CCNC: 61 U/L (ref 0–39)
BILIRUB SERPL-MCNC: 1.3 MG/DL (ref 0–1.2)
BILIRUBIN DIRECT: 0.7 MG/DL (ref 0–0.3)
BILIRUBIN, INDIRECT: 0.6 MG/DL (ref 0–1)
BUN BLDV-MCNC: 14 MG/DL (ref 8–23)
CALCIUM SERPL-MCNC: 7.6 MG/DL (ref 8.6–10.2)
CHLORIDE BLD-SCNC: 94 MMOL/L (ref 98–107)
CO2: 26 MMOL/L (ref 22–29)
CREAT SERPL-MCNC: 0.8 MG/DL (ref 0.7–1.2)
GFR AFRICAN AMERICAN: >60
GFR NON-AFRICAN AMERICAN: >60 ML/MIN/1.73
GLUCOSE BLD-MCNC: 80 MG/DL (ref 74–99)
HCT VFR BLD CALC: 22.1 % (ref 37–54)
HEMOGLOBIN: 7.3 G/DL (ref 12.5–16.5)
INR BLD: 1.9
METER GLUCOSE: 140 MG/DL (ref 74–99)
METER GLUCOSE: 152 MG/DL (ref 74–99)
METER GLUCOSE: 81 MG/DL (ref 74–99)
METER GLUCOSE: 82 MG/DL (ref 74–99)
METER GLUCOSE: 93 MG/DL (ref 74–99)
OSMOLALITY URINE: 478 MOSM/KG (ref 300–900)
OSMOLALITY URINE: 523 MOSM/KG (ref 300–900)
OSMOLALITY URINE: 549 MOSM/KG (ref 300–900)
OSMOLALITY URINE: 670 MOSM/KG (ref 300–900)
POTASSIUM REFLEX MAGNESIUM: 3.9 MMOL/L (ref 3.5–5)
PROTHROMBIN TIME: 21.8 SEC (ref 9.3–12.4)
SODIUM BLD-SCNC: 125 MMOL/L (ref 132–146)
TOTAL PROTEIN: 4.9 G/DL (ref 6.4–8.3)

## 2021-01-01 PROCEDURE — 6360000002 HC RX W HCPCS: Performed by: INTERNAL MEDICINE

## 2021-01-01 PROCEDURE — 6370000000 HC RX 637 (ALT 250 FOR IP): Performed by: INTERNAL MEDICINE

## 2021-01-01 PROCEDURE — 85018 HEMOGLOBIN: CPT

## 2021-01-01 PROCEDURE — 2580000003 HC RX 258: Performed by: INTERNAL MEDICINE

## 2021-01-01 PROCEDURE — 87070 CULTURE OTHR SPECIMN AEROBIC: CPT

## 2021-01-01 PROCEDURE — 85014 HEMATOCRIT: CPT

## 2021-01-01 PROCEDURE — 80048 BASIC METABOLIC PNL TOTAL CA: CPT

## 2021-01-01 PROCEDURE — 83935 ASSAY OF URINE OSMOLALITY: CPT

## 2021-01-01 PROCEDURE — 85610 PROTHROMBIN TIME: CPT

## 2021-01-01 PROCEDURE — 36415 COLL VENOUS BLD VENIPUNCTURE: CPT

## 2021-01-01 PROCEDURE — 82962 GLUCOSE BLOOD TEST: CPT

## 2021-01-01 PROCEDURE — C9113 INJ PANTOPRAZOLE SODIUM, VIA: HCPCS | Performed by: INTERNAL MEDICINE

## 2021-01-01 PROCEDURE — 2700000000 HC OXYGEN THERAPY PER DAY

## 2021-01-01 PROCEDURE — 87206 SMEAR FLUORESCENT/ACID STAI: CPT

## 2021-01-01 PROCEDURE — 80076 HEPATIC FUNCTION PANEL: CPT

## 2021-01-01 PROCEDURE — 1200000000 HC SEMI PRIVATE

## 2021-01-01 PROCEDURE — 2500000003 HC RX 250 WO HCPCS: Performed by: INTERNAL MEDICINE

## 2021-01-01 PROCEDURE — 99232 SBSQ HOSP IP/OBS MODERATE 35: CPT | Performed by: INTERNAL MEDICINE

## 2021-01-01 PROCEDURE — 94660 CPAP INITIATION&MGMT: CPT

## 2021-01-01 RX ORDER — SODIUM CHLORIDE 9 MG/ML
INJECTION, SOLUTION INTRAVENOUS CONTINUOUS
Status: DISCONTINUED | OUTPATIENT
Start: 2021-01-01 | End: 2021-01-01

## 2021-01-01 RX ORDER — SODIUM CHLORIDE 1000 MG
1 TABLET, SOLUBLE MISCELLANEOUS
Status: DISCONTINUED | OUTPATIENT
Start: 2021-01-01 | End: 2021-01-02 | Stop reason: SDUPTHER

## 2021-01-01 RX ADMIN — ONDANSETRON HYDROCHLORIDE 4 MG: 2 SOLUTION INTRAMUSCULAR; INTRAVENOUS at 03:39

## 2021-01-01 RX ADMIN — SODIUM CHLORIDE: 9 INJECTION, SOLUTION INTRAVENOUS at 10:38

## 2021-01-01 RX ADMIN — OCTREOTIDE ACETATE 50 MCG/HR: 500 INJECTION, SOLUTION INTRAVENOUS; SUBCUTANEOUS at 10:38

## 2021-01-01 RX ADMIN — PANTOPRAZOLE SODIUM 40 MG: 40 INJECTION, POWDER, LYOPHILIZED, FOR SOLUTION INTRAVENOUS at 21:12

## 2021-01-01 RX ADMIN — METRONIDAZOLE 500 MG: 500 INJECTION, SOLUTION INTRAVENOUS at 03:35

## 2021-01-01 RX ADMIN — METRONIDAZOLE 500 MG: 500 INJECTION, SOLUTION INTRAVENOUS at 21:13

## 2021-01-01 RX ADMIN — Medication 10 ML: at 09:42

## 2021-01-01 RX ADMIN — METRONIDAZOLE 500 MG: 500 INJECTION, SOLUTION INTRAVENOUS at 13:38

## 2021-01-01 RX ADMIN — THIAMINE HCL TAB 100 MG 100 MG: 100 TAB at 09:42

## 2021-01-01 RX ADMIN — ACETAMINOPHEN 650 MG: 325 TABLET, FILM COATED ORAL at 13:47

## 2021-01-01 RX ADMIN — INSULIN GLARGINE 24 UNITS: 100 INJECTION, SOLUTION SUBCUTANEOUS at 21:36

## 2021-01-01 RX ADMIN — Medication 1 G: at 17:24

## 2021-01-01 RX ADMIN — PANTOPRAZOLE SODIUM 40 MG: 40 INJECTION, POWDER, LYOPHILIZED, FOR SOLUTION INTRAVENOUS at 09:42

## 2021-01-01 RX ADMIN — Medication 10 ML: at 21:36

## 2021-01-01 RX ADMIN — Medication 1 G: at 13:38

## 2021-01-01 RX ADMIN — ENOXAPARIN SODIUM 40 MG: 40 INJECTION SUBCUTANEOUS at 09:42

## 2021-01-01 RX ADMIN — OCTREOTIDE ACETATE 50 MCG/HR: 500 INJECTION, SOLUTION INTRAVENOUS; SUBCUTANEOUS at 20:07

## 2021-01-01 RX ADMIN — ATORVASTATIN CALCIUM 40 MG: 20 TABLET, FILM COATED ORAL at 21:11

## 2021-01-01 RX ADMIN — LEVOFLOXACIN 500 MG: 5 INJECTION, SOLUTION INTRAVENOUS at 17:21

## 2021-01-01 RX ADMIN — OCTREOTIDE ACETATE 50 MCG/HR: 500 INJECTION, SOLUTION INTRAVENOUS; SUBCUTANEOUS at 00:13

## 2021-01-01 NOTE — CARE COORDINATION
José Luis Faustin  received call from Rd Vasques at [facility] stating she called [facility] again and [facility] is not  waiving the auth and she was able to initiate the pre-cert process. SNF pre-cert pending at this time. Nursing informed.

## 2021-01-01 NOTE — CARE COORDINATION
1-1-2021 on call for case management: Yuni Zhang  received call from physician regarding discharge plan for patient. Maryellen Garsia contacted Charles Sinclair, admission liaison at [facility] ([phone]) who stated she was able to access [facility] insurance site today and it listed patient as having active benefit coverage. Charles Sinclair called [facility] to initiate SNF pre-cert, however, she was unable to start the process due to [facility] being closed today. Charles Sinclair stated she will call [facility] again tomorrow 1-2-2021 and attempt to initiate pre-cert. Nursing updated on above information.

## 2021-01-01 NOTE — PROGRESS NOTES
PROGRESS NOTE    By Matthew Kuhn D.O GI Fellow    [facility]  Dr. Nas Mcduffie MD, Dr. Herman Go MD, Dr Jessica Evans, Dr. Deepa Sargent MD, Dr. Rasheed Betancourt, DO Dipak Xiong  63 y.o.  male    SUBJECTIVE:  Patient alert and oriented x3 this AM.  He denies any hematemesis or melena      OBJECTIVE:    BP (!) 96/50   Pulse 76   Temp 99.3 °F (37.4 °C) (Oral)   Resp 18   Ht 5' 10\" (1.778 m)   Wt 208 lb 1.6 oz (94.4 kg)   SpO2 96%   BMI 29.86 kg/m²     Gen: NAD, alert oriented x3  HEENT:PEERL, no icterus  Heart: RRR, no M/R/G  Lungs: CTAB  Abd.: soft, NT, ND, BS +, no G/R, no HSM  Extr.:  No lower extremity edema present patient with ecchymosis upper and lower extremities       Lab Results   Component Value Date    WBC 14.6 12/31/2020    WBC 12.2 12/30/2020    WBC 7.2 12/29/2020    HGB 8.0 12/31/2020    HGB 7.6 12/30/2020    HGB 7.7 12/29/2020    HCT 23.9 12/31/2020    MCV 85.1 12/31/2020    RDW 15.9 12/31/2020     12/31/2020     12/30/2020     12/29/2020     Lab Results   Component Value Date     01/01/2021    K 3.9 01/01/2021    CL 94 01/01/2021    CO2 26 01/01/2021    BUN 14 01/01/2021    CREATININE 0.8 01/01/2021    CALCIUM 7.6 01/01/2021    PROT 4.9 01/01/2021    LABALBU 2.5 01/01/2021    BILITOT 1.3 01/01/2021    BILITOT 1.8 12/31/2020    BILITOT 1.3 12/30/2020    ALKPHOS 79 01/01/2021    ALKPHOS 84 12/31/2020    ALKPHOS 78 12/30/2020    AST 61 01/01/2021     12/31/2020     12/30/2020     01/01/2021     12/31/2020     12/30/2020     Lab Results   Component Value Date    LIPASE 40 12/27/2020     Lab Results   Component Value Date    AMYLASE 205 12/27/2020         ASSESSMENT/PLAN:    1.   Acute on chronic microcytic anemia/possible coffee-ground emesis  - No overt hematemesis,  - Hgb 6.2 this to be transfused 1 unit of PRBC/ Type and Cross 2 units on hold/q 8 Hgb transfuse per admitting - Continue to hold Plavix and Aspirin if feasible per admitting    - Continue with Protonix IV BID, continue with octreotide 50 MCG's per hour   -Cleaer liquid diet today   - Plan on EGD 1/2 for evaluation with resolution of hyponatremia   -Continue supportive care    2. Elevated Liver transaminases-trending down  -Possibly secondary to patient's hypotension ischemic hepatitis  - R Factor 17.1 indicating hepatocellular injury  -acute hepatitis panel, MERLIN, Igg all negative   - RUQ US with mildly enlarged fatty liver  - Montior with daily CMP   - Maintain adequate maps greater than 65    3. Severe Hyponatremia- resiolved   - Na 102 on admission   - Per nephrology     Patient with no overt signs of GI bleed on exam.  Patient's hemoglobin is remained stable over last 24 hours 7.7-->7.6 this am. Continue with Protonix and octreotide infusions. Pt was discussed with Dr. Abimbola Khan DO  GI Fellow   1/1/2021    The case was d/w the GI Fellow. I agree with the above assessment/plan. Plan is for possible EGD tomorrow pending patient's sodium.      Lelia Lam D.O.   01/01/21  4:09 PM

## 2021-01-01 NOTE — PROGRESS NOTES
Disaster mode charting initiated at 00:15 on 1/1/2021.     Electronically signed by Sal Figueroa RN on 1/1/2021 at 7:57 AM

## 2021-01-01 NOTE — PROGRESS NOTES
Nephrology Progress Note    1/1/2021: pt is awake alert and states he just ate eggs and waffles for breakfast. Feels tired today. Vital SignsBP 137/63   Pulse 83   Temp 98.7 °F (37.1 °C) (Oral)   Resp 21   Ht 5' 10\" (1.778 m)   Wt 208 lb 1.6 oz (94.4 kg)   SpO2 95%   BMI 29.86 kg/m²   24HR INTAKE/OUTPUT:      Intake/Output Summary (Last 24 hours) at 1/1/2021 0731  Last data filed at 1/1/2021 0616  Gross per 24 hour   Intake 309 ml   Output 2675 ml   Net -2366 ml     Physical Exam    Neck: No JVD  Lungs: Breath sounds decreased at the bases. A few expiratory rhonchi present. Heart: Regular rate and rhythm. No S3 gallop. No  murmrur. Abdomen: Soft non distended, non tender and normal bowel sounds. Extremeties: No edema.     ROS:  RESPIRATORY: (-) for shortness of breath  CARDIOVASCULAR:  negative  GASTROINTESTINAL:  negative  GENITOURINARY:  negative    Current Facility-Administered Medications   Medication Dose Route Frequency Provider Last Rate Last Admin    LORazepam (ATIVAN) tablet 0.5 mg  0.5 mg Oral TID PRN Duyen Mccoy MD   0.5 mg at 12/31/20 0909    levoFLOXacin (LEVAQUIN) 500 MG/100ML infusion 500 mg  500 mg Intravenous Q24H Duyen Mccoy MD   Stopped at 12/31/20 1804    vitamin D (ERGOCALCIFEROL) capsule 50,000 Units  50,000 Units Oral Weekly Erika Rasheed MD   50,000 Units at 12/29/20 1605    insulin glargine (LANTUS) injection vial 24 Units  0.25 Units/kg Subcutaneous Nightly Duyen Mccoy MD   24 Units at 12/31/20 2237    0.9 % sodium chloride bolus  20 mL Intravenous Once Leanord Free, DO        octreotide (SANDOSTATIN) 500 mcg in sodium chloride 0.9 % 100 mL infusion  50 mcg/hr Intravenous Continuous Stacy Micheal, DO 10 mL/hr at 01/01/21 0013 50 mcg/hr at 01/01/21 0013    lidocaine PF 1 % injection 5 mL  5 mL Intradermal Once Duyen Mccoy MD        sodium chloride flush 0.9 % injection 10 mL  10 mL Intravenous PRN Duyen Mccoy MD  heparin flush 100 UNIT/ML injection 300 Units  3 mL Intravenous 2 times per day Anthony Nava MD        heparin flush 100 UNIT/ML injection 300 Units  3 mL Intracatheter PRN Anthony Nava MD        ipratropium-albuterol (DUONEB) nebulizer solution 1 ampule  1 ampule Inhalation Q4H Anthony Nava MD   1 ampule at 12/31/20 0931    metronidazole (FLAGYL) 500 mg in NaCl 100 mL IVPB premix  500 mg Intravenous Q8H Anthony Nava MD   Stopped at 01/01/21 0435    glucose (GLUTOSE) 40 % oral gel 15 g  15 g Oral PRN Anthony Nava MD        dextrose 50 % IV solution  12.5 g Intravenous PRN Anthony Nava MD        glucagon (rDNA) injection 1 mg  1 mg Intramuscular PRN Anthony Nava MD        dextrose 5 % solution  100 mL/hr Intravenous PRN Anthony Nava MD        pantoprazole (PROTONIX) injection 40 mg  40 mg Intravenous BID Tasha Kline DO   40 mg at 12/31/20 2230    insulin lispro (HUMALOG) injection vial 0-12 Units  0-12 Units Subcutaneous Q6H Anthony Nava MD   2 Units at 12/31/20 1716    [Held by provider] aspirin EC tablet 81 mg  81 mg Oral Daily Martinez Walters MD   81 mg at 12/26/20 0902    atorvastatin (LIPITOR) tablet 40 mg  40 mg Oral Nightly Martinez Walters MD   40 mg at 12/31/20 2230    [Held by provider] clopidogrel (PLAVIX) tablet 75 mg  75 mg Oral Daily Martinez Walters MD   75 mg at 12/26/20 0902    thiamine mononitrate tablet 100 mg  100 mg Oral Daily Martinez Walters MD   100 mg at 12/31/20 0907    sodium chloride flush 0.9 % injection 10 mL  10 mL Intravenous 2 times per day Brooke Dunlap MD   10 mL at 12/31/20 2232    enoxaparin (LOVENOX) injection 40 mg  40 mg Subcutaneous Daily Martinez Walters MD   40 mg at 12/31/20 0956    promethazine (PHENERGAN) tablet 12.5 mg  12.5 mg Oral Q6H PRN Martinez Walters MD        Or    ondansetron (ZOFRAN) injection 4 mg  4 mg Intravenous Q6H PRN Martinez Walters MD   4 mg at 01/01/21 0339    polyethylene glycol (GLYCOLAX) packet 17 g  17 g Oral Daily PRN Brooke Dunlap MD  acetaminophen (TYLENOL) tablet 650 mg  650 mg Oral Q6H PRN Martinez Walters MD   650 mg at 12/31/20 2234    Or    acetaminophen (TYLENOL) suppository 650 mg  650 mg Rectal Q6H PRN Martinez Walters MD   650 mg at 12/26/20 2325       CT CHEST WO CONTRAST   Final Result   Scattered, patchy ground-glass and consolidative airspace opacities   throughout both lungs, most pronounced in the lower lobes bilaterally. Small   bilateral pleural effusions are present as well. Findings are consistent   with multifocal pneumonia. Viral/atypical pneumonia not excluded. Hepatic steatosis. Small volume of free fluid visualized along the anterior pararenal space   bilaterally. This is of uncertain etiology or clinical significance. Consider correlation with serum lipase levels. CT of the abdomen and pelvis   could be considered as well, if clinically appropriate. XR CHEST PORTABLE   Final Result   In consideration to differences in lung volumes, no significant interval   change compared to 1 day prior. XR CHEST PORTABLE   Final Result   Continued bilateral pulmonary airspace infiltrates, with mild improved   aeration of the right lung base. US ABDOMEN LIMITED   Final Result   Mildly enlarged fatty liver. XR CHEST PORTABLE   Final Result   1. No interval change in the significant multifocal bilateral perihilar and   lower lobe infiltrates. XR CHEST PORTABLE   Final Result   Bilateral pulmonary infiltrates no significant interval change      XR CHEST PORTABLE   Final Result   Left basilar pulmonary opacity which may represent atelectasis, pneumonia,   effusion or a combination thereof. XR SHOULDER LEFT (MIN 2 VIEWS)   Final Result   No acute fracture or dislocation in the left shoulder. XR CHEST (2 VW)   Final Result   No acute cardiopulmonary process.             Labs:    CBC:   Recent Labs     12/29/20  2110 12/30/20  0430 12/31/20  1356   WBC  --  12.2* 14.6*   HGB 7.7* 7.6* 8.0* PLT  --  149 151        Lab Results   Component Value Date    IRON 17 (L) 12/28/2020    TIBC 232 (L) 12/28/2020    FERRITIN 125 12/28/2020       Lab Results   Component Value Date    PTH 60 12/28/2020    CALCIUM 7.6 (L) 01/01/2021    CALCIUM 7.8 (L) 12/31/2020    CALCIUM 7.7 (L) 12/31/2020    CAION 1.16 12/30/2020    CAION 1.17 12/29/2020    CAION 1.13 (L) 12/28/2020    PHOS 2.6 12/28/2020    PHOS 3.7 11/12/2015    PHOS 3.7 11/11/2015    MG 2.0 12/30/2020    MG 2.2 12/30/2020    MG 2.4 12/30/2020       BMP:   Recent Labs     12/30/20  2333 12/31/20  1359 01/01/21  0529   * 131*  126* 125*   K 3.9 4.1  4.0 3.9   CL 93* 96*  93* 94*   CO2 27 24  25 26   BUN 16 15  14 14   CREATININE 0.9 0.9  0.8 0.8   GLUCOSE 162* 123*  121* 80     Assessment: / Plan:    1. Severe hyponatremia. Hyponatremia believed to be secondary to multiple factors including thiazide diuretic use, significant history of alcohol abuse in the form of beer limiting free water excretion due to low solute load as well as possibility of underlying SIADH. S/P 1 dose dDAVP on 12/28 at 2300  Na+ up to 125 this AM  PLAN:1.Follow Na+  2. Follow Urine osm    2. Acute kidney injury. FeNa <1% suggesting possible component of decreased effective renal perfusion in the setting of the hypotension  Cr down to 0.8 mg/dl  UOP 2675mL in last 24 hours  PLAN:1. Continue to monitor    3. Metabolic acidosis with increased anion gap. Patient with a lactic acidosis possible Type A secondary to tissue hypoperfusion as well as a possible Type B due to  use of Metformin. Lactic Acid down from 6.3-->2.5-->2.2=>Resolved  PLAN:1. Continue off Metformin     4. Hypotension. Resolved  PLAN:1.Monitor    5. Microcytic Anemia. Hgb down to 6.2-->7.3->7.6-->8.0 S/P 1 unit PRBC 12/28/20  Ferritin 125 with an iron sat 7%  PLAN:1. Would treat with IV Fe++ once infection cleared and pt off antibx    6. Hypocalcemia.  In the setting of hypoalbumionemia Ionized Ca++ 1.16 WNL  PTH 60  Unspecified Vit D Def  With Vit D 12  PLAN:1.Continue Vit D supplement    ALBERT Hickman - CNP  1/1/2021  7:40 AM  Pt seen and examined-no new complaints-awake alert and appropriate  All Labs reviewed  A/P:  1.  Hyponatremia-stop the IVF, start a FR and start NaCl    Agree with the remainder as above    AGEUDA Chavira MD

## 2021-01-01 NOTE — PROGRESS NOTES
Department of Internal Medicine  General Internal Medicine  Attending Progress Note      SUBJECTIVE:    Reports that he is doing well. He is aware of plan for possible discharge him to nursing home. Patient noted that he used to walk with a cane at home before this current admission.       OBJECTIVE      Medications    Current Facility-Administered Medications: 0.9 % sodium chloride infusion, , Intravenous, Continuous  LORazepam (ATIVAN) tablet 0.5 mg, 0.5 mg, Oral, TID PRN  levoFLOXacin (LEVAQUIN) 500 MG/100ML infusion 500 mg, 500 mg, Intravenous, Q24H  vitamin D (ERGOCALCIFEROL) capsule 50,000 Units, 50,000 Units, Oral, Weekly  insulin glargine (LANTUS) injection vial 24 Units, 0.25 Units/kg, Subcutaneous, Nightly  0.9 % sodium chloride bolus, 20 mL, Intravenous, Once  octreotide (SANDOSTATIN) 500 mcg in sodium chloride 0.9 % 100 mL infusion, 50 mcg/hr, Intravenous, Continuous  lidocaine PF 1 % injection 5 mL, 5 mL, Intradermal, Once  sodium chloride flush 0.9 % injection 10 mL, 10 mL, Intravenous, PRN  heparin flush 100 UNIT/ML injection 300 Units, 3 mL, Intravenous, 2 times per day  heparin flush 100 UNIT/ML injection 300 Units, 3 mL, Intracatheter, PRN  ipratropium-albuterol (DUONEB) nebulizer solution 1 ampule, 1 ampule, Inhalation, Q4H  metronidazole (FLAGYL) 500 mg in NaCl 100 mL IVPB premix, 500 mg, Intravenous, Q8H  glucose (GLUTOSE) 40 % oral gel 15 g, 15 g, Oral, PRN  dextrose 50 % IV solution, 12.5 g, Intravenous, PRN  glucagon (rDNA) injection 1 mg, 1 mg, Intramuscular, PRN  dextrose 5 % solution, 100 mL/hr, Intravenous, PRN  pantoprazole (PROTONIX) injection 40 mg, 40 mg, Intravenous, BID  insulin lispro (HUMALOG) injection vial 0-12 Units, 0-12 Units, Subcutaneous, Q6H  [Held by provider] aspirin EC tablet 81 mg, 81 mg, Oral, Daily  atorvastatin (LIPITOR) tablet 40 mg, 40 mg, Oral, Nightly  [Held by provider] clopidogrel (PLAVIX) tablet 75 mg, 75 mg, Oral, Daily thiamine mononitrate tablet 100 mg, 100 mg, Oral, Daily  sodium chloride flush 0.9 % injection 10 mL, 10 mL, Intravenous, 2 times per day  enoxaparin (LOVENOX) injection 40 mg, 40 mg, Subcutaneous, Daily  promethazine (PHENERGAN) tablet 12.5 mg, 12.5 mg, Oral, Q6H PRN **OR** ondansetron (ZOFRAN) injection 4 mg, 4 mg, Intravenous, Q6H PRN  polyethylene glycol (GLYCOLAX) packet 17 g, 17 g, Oral, Daily PRN  acetaminophen (TYLENOL) tablet 650 mg, 650 mg, Oral, Q6H PRN **OR** acetaminophen (TYLENOL) suppository 650 mg, 650 mg, Rectal, Q6H PRN  Physical    VITALS:  BP (!) 96/50   Pulse 76   Temp 99.3 °F (37.4 °C) (Oral)   Resp 18   Ht 5' 10\" (1.778 m)   Wt 208 lb 1.6 oz (94.4 kg)   SpO2 96%   BMI 29.86 kg/m²   CONSTITUTIONAL:  awake, alert, cooperative, no apparent distress, and appears stated age  EYES:  Lids and lashes normal, pupils equal, round and reactive to light, extra ocular muscles intact, sclera clear, conjunctiva normal  ENT:  normocepalic, without obvious abnormality  NECK:  supple, symmetrical, trachea midline  HEMATOLOGIC/LYMPHATICS:  no supraclavicular lymphadenopathy  LUNGS:  No increased work of breathing, good air exchange, clear to auscultation bilaterally, no crackles or wheezing  CARDIOVASCULAR:  normal apical pulses  ABDOMEN:  No scars, normal bowel sounds, soft, non-distended, non-tender, no masses palpated, no hepatosplenomegally  MUSCULOSKELETAL:  there is no redness, warmth, or swelling of the joints  NEUROLOGIC:  Awake, alert, oriented to name, place and time. Cranial nerves II-XII are grossly intact. Motor is 5 out of 5 bilaterally. Cerebellar finger to nose, heel to shin intact. Sensory is intact.   Babinski down going, Romberg negative, and gait is normal.  SKIN:  no bruising or bleeding  Data    CBC:   Lab Results   Component Value Date    WBC 14.6 12/31/2020    RBC 2.81 12/31/2020    HGB 8.0 12/31/2020    HCT 23.9 12/31/2020    MCV 85.1 12/31/2020    MCH 28.5 12/31/2020 MCHC 33.5 12/31/2020    RDW 15.9 12/31/2020     12/31/2020    MPV 9.9 12/31/2020     BMP:    Lab Results   Component Value Date     01/01/2021    K 3.9 01/01/2021    CL 94 01/01/2021    CO2 26 01/01/2021    BUN 14 01/01/2021    LABALBU 2.5 01/01/2021    CREATININE 0.8 01/01/2021    CALCIUM 7.6 01/01/2021    GFRAA >60 01/01/2021    LABGLOM >60 01/01/2021    GLUCOSE 80 01/01/2021       ASSESSMENT AND PLAN      1. Severe hyponatremia:  Sodium came up to 131 yesterday evening. Fluid was discontinued. However this morning sodium dropped to 125. Overall compared to admission patient is doing better. However we will start him on IV fluid at 75 cc/h. Continue to monitor. BMP in the morning. 2.  History of CVA (cerebral vascular accident) ([facility] [address]  PT OT    3. Diabetes mellitus ([facility] [address]  This is currently well controlled. Continue Lantus at 24 units nightly. 4.  Aspiration pneumonia. Being treated with Levaquin and metronidazole. Continue to monitor. 5. Debility: This is suspected to be from residual effect of stroke.

## 2021-01-02 ENCOUNTER — ANESTHESIA (OUTPATIENT)
Dept: ENDOSCOPY | Age: 61
DRG: 871 | End: 2021-01-02
Payer: MEDICARE

## 2021-01-02 ENCOUNTER — ANESTHESIA EVENT (OUTPATIENT)
Dept: ENDOSCOPY | Age: 61
DRG: 871 | End: 2021-01-02
Payer: MEDICARE

## 2021-01-02 LAB
ALBUMIN SERPL-MCNC: 2.5 G/DL (ref 3.5–5.2)
ALP BLD-CCNC: 91 U/L (ref 40–129)
ALT SERPL-CCNC: 79 U/L (ref 0–40)
ANION GAP SERPL CALCULATED.3IONS-SCNC: 5 MMOL/L (ref 7–16)
AST SERPL-CCNC: 43 U/L (ref 0–39)
BILIRUB SERPL-MCNC: 1 MG/DL (ref 0–1.2)
BILIRUBIN DIRECT: 0.6 MG/DL (ref 0–0.3)
BILIRUBIN, INDIRECT: 0.4 MG/DL (ref 0–1)
BUN BLDV-MCNC: 16 MG/DL (ref 8–23)
CALCIUM SERPL-MCNC: 7.5 MG/DL (ref 8.6–10.2)
CHLORIDE BLD-SCNC: 100 MMOL/L (ref 98–107)
CO2: 23 MMOL/L (ref 22–29)
CREAT SERPL-MCNC: 0.9 MG/DL (ref 0.7–1.2)
GFR AFRICAN AMERICAN: >60
GFR NON-AFRICAN AMERICAN: >60 ML/MIN/1.73
GLUCOSE BLD-MCNC: 72 MG/DL (ref 74–99)
HCT VFR BLD CALC: 23.5 % (ref 37–54)
HCT VFR BLD CALC: 24 % (ref 37–54)
HCT VFR BLD CALC: 24.5 % (ref 37–54)
HEMOGLOBIN: 7.6 G/DL (ref 12.5–16.5)
HEMOGLOBIN: 7.7 G/DL (ref 12.5–16.5)
HEMOGLOBIN: 7.8 G/DL (ref 12.5–16.5)
METER GLUCOSE: 111 MG/DL (ref 74–99)
METER GLUCOSE: 117 MG/DL (ref 74–99)
METER GLUCOSE: 161 MG/DL (ref 74–99)
METER GLUCOSE: 178 MG/DL (ref 74–99)
METER GLUCOSE: 68 MG/DL (ref 74–99)
METER GLUCOSE: 75 MG/DL (ref 74–99)
METER GLUCOSE: 94 MG/DL (ref 74–99)
METER GLUCOSE: 97 MG/DL (ref 74–99)
OSMOLALITY URINE: 581 MOSM/KG (ref 300–900)
OSMOLALITY URINE: 600 MOSM/KG (ref 300–900)
OSMOLALITY URINE: 609 MOSM/KG (ref 300–900)
OSMOLALITY URINE: 610 MOSM/KG (ref 300–900)
OSMOLALITY URINE: 616 MOSM/KG (ref 300–900)
POTASSIUM SERPL-SCNC: 4.3 MMOL/L (ref 3.5–5)
SODIUM BLD-SCNC: 128 MMOL/L (ref 132–146)
TOTAL PROTEIN: 5 G/DL (ref 6.4–8.3)

## 2021-01-02 PROCEDURE — 80076 HEPATIC FUNCTION PANEL: CPT

## 2021-01-02 PROCEDURE — 94660 CPAP INITIATION&MGMT: CPT

## 2021-01-02 PROCEDURE — 6370000000 HC RX 637 (ALT 250 FOR IP): Performed by: INTERNAL MEDICINE

## 2021-01-02 PROCEDURE — 80048 BASIC METABOLIC PNL TOTAL CA: CPT

## 2021-01-02 PROCEDURE — 85014 HEMATOCRIT: CPT

## 2021-01-02 PROCEDURE — 6360000002 HC RX W HCPCS: Performed by: INTERNAL MEDICINE

## 2021-01-02 PROCEDURE — 2580000003 HC RX 258: Performed by: INTERNAL MEDICINE

## 2021-01-02 PROCEDURE — 2500000003 HC RX 250 WO HCPCS: Performed by: INTERNAL MEDICINE

## 2021-01-02 PROCEDURE — 1200000000 HC SEMI PRIVATE

## 2021-01-02 PROCEDURE — 99232 SBSQ HOSP IP/OBS MODERATE 35: CPT | Performed by: INTERNAL MEDICINE

## 2021-01-02 PROCEDURE — C9113 INJ PANTOPRAZOLE SODIUM, VIA: HCPCS | Performed by: INTERNAL MEDICINE

## 2021-01-02 PROCEDURE — 82962 GLUCOSE BLOOD TEST: CPT

## 2021-01-02 PROCEDURE — 83935 ASSAY OF URINE OSMOLALITY: CPT

## 2021-01-02 PROCEDURE — 85018 HEMOGLOBIN: CPT

## 2021-01-02 PROCEDURE — 36415 COLL VENOUS BLD VENIPUNCTURE: CPT

## 2021-01-02 PROCEDURE — 2700000000 HC OXYGEN THERAPY PER DAY

## 2021-01-02 RX ORDER — INSULIN GLARGINE 100 [IU]/ML
12 INJECTION, SOLUTION SUBCUTANEOUS ONCE
Status: COMPLETED | OUTPATIENT
Start: 2021-01-02 | End: 2021-01-02

## 2021-01-02 RX ORDER — SODIUM CHLORIDE 1000 MG
1 TABLET, SOLUBLE MISCELLANEOUS
Status: DISPENSED | OUTPATIENT
Start: 2021-01-02 | End: 2021-01-04

## 2021-01-02 RX ORDER — SODIUM CHLORIDE 9 MG/ML
INJECTION, SOLUTION INTRAVENOUS ONCE
Status: DISCONTINUED | OUTPATIENT
Start: 2021-01-02 | End: 2021-01-02

## 2021-01-02 RX ADMIN — DEXTROSE MONOHYDRATE 12.5 G: 25 INJECTION, SOLUTION INTRAVENOUS at 08:48

## 2021-01-02 RX ADMIN — ATORVASTATIN CALCIUM 40 MG: 20 TABLET, FILM COATED ORAL at 21:51

## 2021-01-02 RX ADMIN — METRONIDAZOLE 500 MG: 500 INJECTION, SOLUTION INTRAVENOUS at 11:26

## 2021-01-02 RX ADMIN — LEVOFLOXACIN 500 MG: 5 INJECTION, SOLUTION INTRAVENOUS at 15:10

## 2021-01-02 RX ADMIN — INSULIN LISPRO 2 UNITS: 100 INJECTION, SOLUTION INTRAVENOUS; SUBCUTANEOUS at 17:05

## 2021-01-02 RX ADMIN — METRONIDAZOLE 500 MG: 500 INJECTION, SOLUTION INTRAVENOUS at 18:40

## 2021-01-02 RX ADMIN — INSULIN GLARGINE 12 UNITS: 100 INJECTION, SOLUTION SUBCUTANEOUS at 21:54

## 2021-01-02 RX ADMIN — PANTOPRAZOLE SODIUM 40 MG: 40 INJECTION, POWDER, LYOPHILIZED, FOR SOLUTION INTRAVENOUS at 08:53

## 2021-01-02 RX ADMIN — PANTOPRAZOLE SODIUM 40 MG: 40 INJECTION, POWDER, LYOPHILIZED, FOR SOLUTION INTRAVENOUS at 21:51

## 2021-01-02 RX ADMIN — METRONIDAZOLE 500 MG: 500 INJECTION, SOLUTION INTRAVENOUS at 03:16

## 2021-01-02 RX ADMIN — Medication 10 ML: at 08:53

## 2021-01-02 RX ADMIN — Medication 1 G: at 13:58

## 2021-01-02 RX ADMIN — Medication 1 G: at 17:05

## 2021-01-02 RX ADMIN — Medication 10 ML: at 21:51

## 2021-01-02 ASSESSMENT — PAIN SCALES - GENERAL: PAINLEVEL_OUTOF10: 0

## 2021-01-02 NOTE — PROGRESS NOTES
Nephrology Progress Note    1/2/2021: pt is awake alert and was upping lunch at the time of my visit    Vital SignsBP (!) 95/54   Pulse 73   Temp 99.6 °F (37.6 °C) (Oral)   Resp 16   Ht 5' 10\" (1.778 m)   Wt 208 lb 1.6 oz (94.4 kg)   SpO2 94%   BMI 29.86 kg/m²   24HR INTAKE/OUTPUT:      Intake/Output Summary (Last 24 hours) at 1/2/2021 1306  Last data filed at 1/2/2021 1012  Gross per 24 hour   Intake 340 ml   Output 1800 ml   Net -1460 ml     Physical Exam    Neck: No JVD  Lungs: Breath sounds decreased at the bases. A few expiratory rhonchi present. Heart: Regular rate and rhythm. No S3 gallop. No  murmrur. Abdomen: Soft non distended, non tender and normal bowel sounds. Extremeties: No edema.     ROS:  RESPIRATORY: (-) for shortness of breath  CARDIOVASCULAR:  negative  GASTROINTESTINAL:  negative  GENITOURINARY:  negative    Current Facility-Administered Medications   Medication Dose Route Frequency Provider Last Rate Last Admin    sodium chloride tablet 1 g  1 g Oral TID  Niecy Chanel MD        0.9 % sodium chloride infusion   Intravenous Once Niecy Chanel MD        LORazepam (ATIVAN) tablet 0.5 mg  0.5 mg Oral TID PRN Kevin Martini MD   0.5 mg at 12/31/20 0909    levoFLOXacin (LEVAQUIN) 500 MG/100ML infusion 500 mg  500 mg Intravenous Q24H Kevin Martini MD   Stopped at 01/01/21 1830    vitamin D (ERGOCALCIFEROL) capsule 50,000 Units  50,000 Units Oral Weekly Tameka Sheridan MD   50,000 Units at 12/29/20 1605    insulin glargine (LANTUS) injection vial 24 Units  0.25 Units/kg Subcutaneous Nightly Kevin Martini MD   24 Units at 01/01/21 2136    0.9 % sodium chloride bolus  20 mL Intravenous Once Aura Martel DO        octreotide (SANDOSTATIN) 500 mcg in sodium chloride 0.9 % 100 mL infusion  50 mcg/hr Intravenous Continuous Bhavin Redman DO 10 mL/hr at 01/01/21 2007 50 mcg/hr at 01/01/21 2007  ondansetron (ZOFRAN) injection 4 mg  4 mg Intravenous Q6H PRN Martinez Walters MD   4 mg at 01/01/21 0339    polyethylene glycol (GLYCOLAX) packet 17 g  17 g Oral Daily PRN Martinez Walters MD        acetaminophen (TYLENOL) tablet 650 mg  650 mg Oral Q6H PRN Martinez Walters MD   650 mg at 01/01/21 1347    Or    acetaminophen (TYLENOL) suppository 650 mg  650 mg Rectal Q6H PRN Martinez Walters MD   650 mg at 12/26/20 2325       CT CHEST WO CONTRAST   Final Result   Scattered, patchy ground-glass and consolidative airspace opacities   throughout both lungs, most pronounced in the lower lobes bilaterally. Small   bilateral pleural effusions are present as well. Findings are consistent   with multifocal pneumonia. Viral/atypical pneumonia not excluded. Hepatic steatosis. Small volume of free fluid visualized along the anterior pararenal space   bilaterally. This is of uncertain etiology or clinical significance. Consider correlation with serum lipase levels. CT of the abdomen and pelvis   could be considered as well, if clinically appropriate. XR CHEST PORTABLE   Final Result   In consideration to differences in lung volumes, no significant interval   change compared to 1 day prior. XR CHEST PORTABLE   Final Result   Continued bilateral pulmonary airspace infiltrates, with mild improved   aeration of the right lung base. US ABDOMEN LIMITED   Final Result   Mildly enlarged fatty liver. XR CHEST PORTABLE   Final Result   1. No interval change in the significant multifocal bilateral perihilar and   lower lobe infiltrates. XR CHEST PORTABLE   Final Result   Bilateral pulmonary infiltrates no significant interval change      XR CHEST PORTABLE   Final Result   Left basilar pulmonary opacity which may represent atelectasis, pneumonia,   effusion or a combination thereof. XR SHOULDER LEFT (MIN 2 VIEWS)   Final Result   No acute fracture or dislocation in the left shoulder.       XR CHEST (2 VW) Final Result   No acute cardiopulmonary process. Labs:    CBC:   Recent Labs     12/31/20  1356 01/01/21  1843 01/02/21  0030 01/02/21  1052   WBC 14.6*  --   --   --    HGB 8.0* 7.3* 7.8* 7.6*     --   --   --         Lab Results   Component Value Date    IRON 17 (L) 12/28/2020    TIBC 232 (L) 12/28/2020    FERRITIN 125 12/28/2020       Lab Results   Component Value Date    PTH 60 12/28/2020    CALCIUM 7.5 (L) 01/02/2021    CALCIUM 7.6 (L) 01/01/2021    CALCIUM 7.8 (L) 12/31/2020    CALCIUM 7.7 (L) 12/31/2020    CAION 1.16 12/30/2020    CAION 1.17 12/29/2020    CAION 1.13 (L) 12/28/2020    PHOS 2.6 12/28/2020    PHOS 3.7 11/12/2015    PHOS 3.7 11/11/2015    MG 2.0 12/30/2020    MG 2.2 12/30/2020    MG 2.4 12/30/2020       BMP:   Recent Labs     12/31/20  1359 01/01/21  0529 01/02/21  0829   *  126* 125* 128*   K 4.1  4.0 3.9 4.3   CL 96*  93* 94* 100   CO2 24  25 26 23   BUN 15  14 14 16   CREATININE 0.9  0.8 0.8 0.9   GLUCOSE 123*  121* 80 72*     Assessment: / Plan:    1. Severe hyponatremia. Hyponatremia believed to be secondary to multiple factors including thiazide diuretic use, significant history of alcohol abuse in the form of beer limiting free water excretion due to low solute load as well as possibility of underlying SIADH. S/P 1 dose dDAVP on 12/28 at 2300  Na+ up to 128 this AM  PLAN:1.Follow Na+  2. D/C Urine osm    2. Acute kidney injury. FeNa <1% suggesting possible component of decreased effective renal perfusion in the setting of the hypotension  Cr down to 0.8 mg/dl  UOP 1650mL in last 24 hours  PLAN:1. Continue to monitor    3. Metabolic acidosis with increased anion gap. Patient with a lactic acidosis possible Type A secondary to tissue hypoperfusion as well as a possible Type B due to  use of Metformin. Lactic Acid down from 6.3-->2.5-->2.2->2.0=>Resolved  PLAN:1. Continue off Metformin     4. Hypotension.  Resolved  PLAN:1.Monitor 5.  Microcytic Anemia. Hgb down to 6.2-->7.3->7.6-->8.0->7.6 S/P 1 unit PRBC 12/28/20  Ferritin 125 with an iron sat 7%  PLAN:1. Would treat with IV Fe++ once infection cleared and pt off antibx    6. Hypocalcemia.  In the setting of hypoalbumionemia   Last Ionized Ca++ 1.16 WNL  PTH 60  Unspecified Vit D Def  With Vit D 12  PLAN:1.Continue Vit D supplement    Bina Chand MD  1/1/2021  7:40 AM

## 2021-01-02 NOTE — PROGRESS NOTES
Nutrition Assessment     Type and Reason for Visit: Initial, RD Nutrition Re-Screen/LOS    Nutrition Recommendations/Plan: Continue Current Diet, Start Oral Nutrition Supplement Boost pudding TID. Check lytes - replace PRN. Nutrition Assessment:  Pt admit w/ severe hyponatremia likely 2/2 diuretic use and ETOH abuse. H/o DM. Poor intake noted since admit. Will start ONS and monitor    Malnutrition Assessment:  Malnutrition Status: Insufficient data    Estimated Daily Nutrient Needs:  Energy (kcal): ; Weight Used for Energy Requirements:  Current     Protein (g): 100-115(1.3-1.5 g/kg); Weight Used for Protein Requirements:  Ideal        Fluid (ml/day): 1500 ml fluid restriction; Weight Used for Fluid Requirements:         Nutrition Related Findings: alert, hyponatremia, low BSL, -I/O, missing teeth, soft abd, +BS, +1/trace edema LE, dry/swollen skin, h/o ETOH abuse      Current Nutrition Therapies:    DIET LOW SODIUM 2 GM; Dysphagia Minced and Moist; Mildly Thick (Nectar); 1500 ml  Diet NPO, After Midnight Exceptions are: Sips with Meds    Anthropometric Measures:  · Height: 5' 10\" (177.8 cm)  · Current Body Wt: 208 lb (94.3 kg)(12/26 bed scale)   · BMI: 29.8    Nutrition Diagnosis:   · Inadequate oral intake related to catabolic illness(ETOH abuse) as evidenced by intake 26-50%, lab values      Nutrition Interventions:   Nutrition Education/Counseling:  No recommendation at this time   Coordination of Nutrition Care:  Continue to monitor while inpatient    Goals:  Consume >50% of meals and ONS       Nutrition Monitoring and Evaluation:   Behavioral-Environmental Outcomes:  None Identified   Food/Nutrient Intake Outcomes:  Food and Nutrient Intake, Supplement Intake  Physical Signs/Symptoms Outcomes:  Biochemical Data, Nutrition Focused Physical Findings, Skin, Weight, GI Status, Fluid Status or Edema     Discharge Planning:     Too soon to determine Electronically signed by Sacha Shirley, MS, RD, LD on 1/2/21 at 11:04 AM EST    Contact:

## 2021-01-02 NOTE — PROGRESS NOTES
Department of Internal Medicine  General Internal Medicine  Attending Progress Note      SUBJECTIVE:    Reports that he is doing okay. Nurse at patient's bedside drawing blood. There was  plans for EGD however this was canceled due to hyponatremia. Patient denied any fever or chills expressed interest in being discharged.     OBJECTIVE      Medications    Current Facility-Administered Medications: sodium chloride tablet 1 g, 1 g, Oral, TID WC  0.9 % sodium chloride infusion, , Intravenous, Once  LORazepam (ATIVAN) tablet 0.5 mg, 0.5 mg, Oral, TID PRN  levoFLOXacin (LEVAQUIN) 500 MG/100ML infusion 500 mg, 500 mg, Intravenous, Q24H  vitamin D (ERGOCALCIFEROL) capsule 50,000 Units, 50,000 Units, Oral, Weekly  insulin glargine (LANTUS) injection vial 24 Units, 0.25 Units/kg, Subcutaneous, Nightly  0.9 % sodium chloride bolus, 20 mL, Intravenous, Once  octreotide (SANDOSTATIN) 500 mcg in sodium chloride 0.9 % 100 mL infusion, 50 mcg/hr, Intravenous, Continuous  lidocaine PF 1 % injection 5 mL, 5 mL, Intradermal, Once  sodium chloride flush 0.9 % injection 10 mL, 10 mL, Intravenous, PRN  heparin flush 100 UNIT/ML injection 300 Units, 3 mL, Intravenous, 2 times per day  heparin flush 100 UNIT/ML injection 300 Units, 3 mL, Intracatheter, PRN  ipratropium-albuterol (DUONEB) nebulizer solution 1 ampule, 1 ampule, Inhalation, Q4H  metronidazole (FLAGYL) 500 mg in NaCl 100 mL IVPB premix, 500 mg, Intravenous, Q8H  glucose (GLUTOSE) 40 % oral gel 15 g, 15 g, Oral, PRN  dextrose 50 % IV solution, 12.5 g, Intravenous, PRN  glucagon (rDNA) injection 1 mg, 1 mg, Intramuscular, PRN  dextrose 5 % solution, 100 mL/hr, Intravenous, PRN  pantoprazole (PROTONIX) injection 40 mg, 40 mg, Intravenous, BID  insulin lispro (HUMALOG) injection vial 0-12 Units, 0-12 Units, Subcutaneous, Q6H  [Held by provider] aspirin EC tablet 81 mg, 81 mg, Oral, Daily  atorvastatin (LIPITOR) tablet 40 mg, 40 mg, Oral, Nightly [Held by provider] clopidogrel (PLAVIX) tablet 75 mg, 75 mg, Oral, Daily  thiamine mononitrate tablet 100 mg, 100 mg, Oral, Daily  sodium chloride flush 0.9 % injection 10 mL, 10 mL, Intravenous, 2 times per day  enoxaparin (LOVENOX) injection 40 mg, 40 mg, Subcutaneous, Daily  promethazine (PHENERGAN) tablet 12.5 mg, 12.5 mg, Oral, Q6H PRN **OR** ondansetron (ZOFRAN) injection 4 mg, 4 mg, Intravenous, Q6H PRN  polyethylene glycol (GLYCOLAX) packet 17 g, 17 g, Oral, Daily PRN  acetaminophen (TYLENOL) tablet 650 mg, 650 mg, Oral, Q6H PRN **OR** acetaminophen (TYLENOL) suppository 650 mg, 650 mg, Rectal, Q6H PRN  Physical    VITALS:  BP (!) 95/54   Pulse 73   Temp 99.6 °F (37.6 °C) (Oral)   Resp 16   Ht 5' 10\" (1.778 m)   Wt 208 lb 1.6 oz (94.4 kg)   SpO2 94%   BMI 29.86 kg/m²   CONSTITUTIONAL:  awake, alert, cooperative, no apparent distress, and appears stated age  EYES:  Lids and lashes normal, pupils equal, round and reactive to light, extra ocular muscles intact, sclera clear, conjunctiva normal  ENT:  normocepalic, without obvious abnormality  NECK:  supple, symmetrical, trachea midline  HEMATOLOGIC/LYMPHATICS:  no cervical lymphadenopathy  LUNGS:  No increased work of breathing, good air exchange, clear to auscultation bilaterally, no crackles or wheezing  CARDIOVASCULAR:  normal apical pulses  ABDOMEN:  No scars, normal bowel sounds, soft, non-distended, non-tender, no masses palpated, no hepatosplenomegally  MUSCULOSKELETAL:  there is no redness, warmth, or swelling of the joints  NEUROLOGIC:  Awake, alert, oriented to name, place and time. Cranial nerves II-XII are grossly intact. Motor is 5 out of 5 bilaterally. Cerebellar finger to nose, heel to shin intact. Sensory is intact.   Babinski down going, Romberg negative, and gait is normal.  SKIN:  no bruising or bleeding  Data    CBC:   Lab Results   Component Value Date    WBC 14.6 12/31/2020    RBC 2.81 12/31/2020    HGB 7.6 01/02/2021 HCT 24.0 01/02/2021    MCV 85.1 12/31/2020    MCH 28.5 12/31/2020    MCHC 33.5 12/31/2020    RDW 15.9 12/31/2020     12/31/2020    MPV 9.9 12/31/2020     BMP:    Lab Results   Component Value Date     01/02/2021    K 4.3 01/02/2021    K 3.9 01/01/2021     01/02/2021    CO2 23 01/02/2021    BUN 16 01/02/2021    LABALBU 2.5 01/02/2021    CREATININE 0.9 01/02/2021    CALCIUM 7.5 01/02/2021    GFRAA >60 01/02/2021    LABGLOM >60 01/02/2021    GLUCOSE 72 01/02/2021       ASSESSMENT AND PLAN      1. Hyponatremia: This is improved with a sodium use 128 this morning. Continue to monitor. Started on sodium tablet  and normal saline at 75 cc. Check BMP in the morning. Etiology of this hyponatremia still suspected to be SIADH, alcohol abuse. 2.  History of CVA (cerebral vascular accident) [facility]):   PT OT  Discharge to rehab    3. Diabetes mellitus ([address]  Continue diabetic diet. Accu-Chek before meals and at bedtime. Continue current insulin dosing. 4 debility. PT OT    5. Hyperlipoidemia. Continue statin. 6.  Anemia mild etiology. We will plan to reduce visualization. We will continue to monitor hemoglobin and transfuse if needed. Current hemoglobin is 7.6.   Change it was heme H&H check to every 12 hours

## 2021-01-02 NOTE — PROGRESS NOTES
PROGRESS NOTE    By Matthew Kuhn D.O GI Fellow    [facility]  Dr. Nas Mcduffie MD, Dr. Herman Go MD, Dr Jessica Evans, Dr. Deepa Sargent MD, Dr. Rasheed Betancourt, DO Dipak Xiong  63 y.o.  male    SUBJECTIVE:  Patient alert and oriented x3 this AM.  He denies any hematemesis or melena      OBJECTIVE:    BP (!) 95/54   Pulse 73   Temp 99.6 °F (37.6 °C) (Oral)   Resp 16   Ht 5' 10\" (1.778 m)   Wt 208 lb 1.6 oz (94.4 kg)   SpO2 94%   BMI 29.86 kg/m²     Gen: NAD, alert oriented x3  HEENT:PEERL, no icterus  Heart: RRR, no M/R/G  Lungs: CTAB  Abd.: soft, NT, ND, BS +, no G/R, no HSM  Extr.:  No lower extremity edema present patient with ecchymosis upper and lower extremities       Lab Results   Component Value Date    WBC 14.6 12/31/2020    WBC 12.2 12/30/2020    WBC 7.2 12/29/2020    HGB 7.8 01/02/2021    HGB 7.3 01/01/2021    HGB 8.0 12/31/2020    HCT 24.5 01/02/2021    MCV 85.1 12/31/2020    RDW 15.9 12/31/2020     12/31/2020     12/30/2020     12/29/2020     Lab Results   Component Value Date     01/02/2021    K 4.3 01/02/2021    K 3.9 01/01/2021     01/02/2021    CO2 23 01/02/2021    BUN 16 01/02/2021    CREATININE 0.9 01/02/2021    CALCIUM 7.5 01/02/2021    PROT 5.0 01/02/2021    LABALBU 2.5 01/02/2021    BILITOT 1.0 01/02/2021    BILITOT 1.3 01/01/2021    BILITOT 1.8 12/31/2020    ALKPHOS 91 01/02/2021    ALKPHOS 79 01/01/2021    ALKPHOS 84 12/31/2020    AST 43 01/02/2021    AST 61 01/01/2021     12/31/2020    ALT 79 01/02/2021     01/01/2021     12/31/2020     Lab Results   Component Value Date    LIPASE 40 12/27/2020     Lab Results   Component Value Date    AMYLASE 205 12/27/2020         ASSESSMENT/PLAN:    1.   Acute on chronic microcytic anemia/possible coffee-ground emesis  - No overt hematemesis/melena  - Hgb 6.2 this to be transfused 1 unit of PRBC/ Type and Cross 2 units on hold/q 8 Hgb transfuse per admitting - Continue to hold Plavix and Aspirin if feasible per admitting    - Continue with Protonix IV BID, continue with octreotide 50 MCG's per hour   - Clear liquid diet today   - Plan is for EGD once there is resolution of hyponatremia   - Continue supportive care    2. Elevated Liver transaminases-trending down  - Possibly secondary to patient's hypotension ischemic hepatitis  - R Factor 17.1 indicating hepatocellular injury  - acute hepatitis panel, MERLIN, Igg all negative   - RUQ US with mildly enlarged fatty liver  - Montior with daily CMP   - Maintain adequate maps greater than 65    3. Severe Hyponatremia- improving  - Na 102 on admission   - Per nephrology     Patient with no overt signs of GI bleed on exam.  Patient's hemoglobin is remained stable over last 24 hours 7.7-->7.6 this am. Continue with Protonix and octreotide infusions. EGD was held this am due to hyponatremia. Okay for diet today NPO after midnight for tentative EGD 1/3/2020. Pt was discussed with Dr. Chester Sanchez, DO  GI Fellow   1/2/2021    The case was d/w the GI Fellow. I agree with the above assessment/plan. Plan was for EGD today pending patients sodium, Anesthesia desiring Na >130 if non-emergent procedure. We will continue to monitor, no acute drop in hgb or overt bleeding at this point. Hgb holding ~7.5. We will continue to monitor the patient. Please see orders for details.      Loco Mahmood D.O.   01/02/21  10:20 AM

## 2021-01-02 NOTE — ANESTHESIA PRE PROCEDURE
Department of Anesthesiology  Preprocedure Note       Name:  Diana Lugo   Age:  63 y.o.  :  1960                                          MRN:  [id number]         Date:  2021      Surgeon: Dalila Beck):  Anna Odom DO    Procedure: Procedure(s):  EGD ESOPHAGOGASTRODUODENOSCOPY    Medications prior to admission:   Prior to Admission medications    Medication Sig Start Date End Date Taking?  Authorizing Provider   aspirin 81 MG EC tablet Take 81 mg by mouth daily    Historical Provider, MD   hydroCHLOROthiazide (HYDRODIURIL) 25 MG tablet Take 25 mg by mouth daily    Historical Provider, MD   acetaminophen 650 MG TABS Take 650 mg by mouth every 4 hours as needed 11/16/15   Milagro Carias DO   glimepiride (AMARYL) 2 MG tablet Take 1 tablet by mouth daily (with breakfast) 11/16/15   Milagro Carias DO   insulin lispro (HUMALOG) 100 UNIT/ML injection vial Inject 0-12 Units into the skin 3 times daily (with meals)  Patient not taking: Reported on 2020 11/16/15   Milagro Carias DO   insulin lispro (HUMALOG) 100 UNIT/ML injection vial Inject 0-6 Units into the skin nightly  Patient not taking: Reported on 2020 11/16/15   Milagro Carias DO   metFORMIN (GLUCOPHAGE) 500 MG tablet Take 1 tablet by mouth 2 times daily (with meals) 11/16/15   Milagro Carias DO   atorvastatin (LIPITOR) 40 MG tablet Take 1 tablet by mouth nightly 11/16/15   Milagro Carias DO   clopidogrel (PLAVIX) 75 MG tablet Take 1 tablet by mouth daily  Patient not taking: Reported on 2020 11/16/15   Milagro Carias DO   vitamin B-1 100 MG tablet Take 1 tablet by mouth daily  Patient not taking: Reported on 2020 11/16/15   Milagro Carias DO       Current medications:    Current Facility-Administered Medications   Medication Dose Route Frequency Provider Last Rate Last Admin    sodium chloride tablet 1 g  1 g Oral TID  Nova Mckeon MD   1 g at 21 [id number]  [Held by provider] aspirin EC tablet 81 mg  81 mg Oral Daily Martinez Walters MD   81 mg at 12/26/20 0902    atorvastatin (LIPITOR) tablet 40 mg  40 mg Oral Nightly Martinez Walters MD   40 mg at 01/01/21 2111    [Held by provider] clopidogrel (PLAVIX) tablet 75 mg  75 mg Oral Daily Martinez Walters MD   75 mg at 12/26/20 0902    thiamine mononitrate tablet 100 mg  100 mg Oral Daily Martinez Walters MD   100 mg at 01/01/21 0942    sodium chloride flush 0.9 % injection 10 mL  10 mL Intravenous 2 times per day Catherine Oneal MD   10 mL at 01/01/21 2136    enoxaparin (LOVENOX) injection 40 mg  40 mg Subcutaneous Daily Martinez Walters MD   40 mg at 01/01/21 0942    promethazine (PHENERGAN) tablet 12.5 mg  12.5 mg Oral Q6H PRN Martinez Walters MD        Or    ondansetron (ZOFRAN) injection 4 mg  4 mg Intravenous Q6H PRN Martinez Walters MD   4 mg at 01/01/21 0339    polyethylene glycol (GLYCOLAX) packet 17 g  17 g Oral Daily PRN Martinez Walters MD        acetaminophen (TYLENOL) tablet 650 mg  650 mg Oral Q6H PRN Martinez Walters MD   650 mg at 01/01/21 1347    Or    acetaminophen (TYLENOL) suppository 650 mg  650 mg Rectal Q6H PRN Martinez Walters MD   650 mg at 12/26/20 2325       Allergies:     Allergies   Allergen Reactions    Pcn [Penicillins]        Problem List:    Patient Active Problem List   Diagnosis Code    [facility] I63.9    Diabetes mellitus ([facility] E11.9    Hyponatremia E87.1       Past Medical History:        Diagnosis Date    Cerebral artery occlusion with cerebral infarction [facility]     Diabetes mellitus ([facility]     Type 2    Hypertension        Past Surgical History:        Procedure Laterality Date    FINGER AMPUTATION      KNEE ARTHROSCOPY      TONSILLECTOMY         Social History:    Social History     Tobacco Use    Smoking status: Former Smoker    Smokeless tobacco: Never Used   Substance Use Topics    Alcohol use: Yes     Frequency: 4 or more times a week     Drinks per session: 5 or 6 Binge frequency: Daily or almost daily     Comment: every day drinker for most of adult life                                Counseling given: Not Answered      Vital Signs (Current):   Vitals:    01/01/21 0448 01/01/21 0730 01/01/21 1330 01/02/21 0030   BP:  (!) 96/50  (!) 147/63   Pulse: 83 76 82 74   Resp:  18  16   Temp:  99.3 °F (37.4 °C) 100.5 °F (38.1 °C) 98.5 °F (36.9 °C)   TempSrc:  Oral  Oral   SpO2:  96% 98% 96%   Weight:       Height:                                                  BP Readings from Last 3 Encounters:   01/02/21 (!) 147/63   06/15/20 (!) 153/82   11/18/15 137/76       NPO Status:                                                                                 BMI:   Wt Readings from Last 3 Encounters:   12/26/20 208 lb 1.6 oz (94.4 kg)   06/18/20 194 lb (88 kg)   06/15/20 194 lb (88 kg)     Body mass index is 29.86 kg/m². CBC:   Lab Results   Component Value Date    WBC 14.6 12/31/2020    RBC 2.81 12/31/2020    HGB 7.8 01/02/2021    HCT 24.5 01/02/2021    MCV 85.1 12/31/2020    RDW 15.9 12/31/2020     12/31/2020       CMP:   Lab Results   Component Value Date     01/01/2021    K 3.9 01/01/2021    CL 94 01/01/2021    CO2 26 01/01/2021    BUN 14 01/01/2021    CREATININE 0.8 01/01/2021    GFRAA >60 01/01/2021    LABGLOM >60 01/01/2021    GLUCOSE 80 01/01/2021    PROT 5.0 01/02/2021    CALCIUM 7.6 01/01/2021    BILITOT 1.0 01/02/2021    ALKPHOS 91 01/02/2021    AST 43 01/02/2021    ALT 79 01/02/2021       POC Tests: No results for input(s): POCGLU, POCNA, POCK, POCCL, POCBUN, POCHEMO, POCHCT in the last 72 hours.     Coags:   Lab Results   Component Value Date    PROTIME 21.8 01/01/2021    INR 1.9 01/01/2021    APTT 34.1 11/15/2015       HCG (If Applicable): No results found for: PREGTESTUR, PREGSERUM, HCG, HCGQUANT     ABGs: No results found for: PHART, PO2ART, JFQ0GTF, VFN5RXZ, BEART, M4BACDCX     Type & Screen (If Applicable):  No results found for: Nahid Right Drug/Infectious Status (If Applicable):  No results found for: HIV, HEPCAB    COVID-19 Screening (If Applicable):   Lab Results   Component Value Date    COVID19 Not Detected 12/26/2020         Anesthesia Evaluation  Patient summary reviewed  Airway:         Dental:          Pulmonary:                             ROS comment: Former Smoker. Cardiovascular:    (+) hypertension:,       ECG reviewed      Echocardiogram reviewed               ROS comment: EKG: Normal Sinus Rhythm 92, NS St & T changes. ECHO:   No previous echo for comparison. Technically adequate study. Mild concentric left ventricular hypertrophy   Ejection fraction is visually estimated at 55%. No regional wall motion abnormalities seen. E/A flow reversal noted. Suggestive of diastolic dysfunction. Mild calcification of the posterior leaflet of the mitral valve. Physiologic and/or trace mitral regurgitation is present. Physiologic and/or trace tricuspid regurgitation. Neuro/Psych:   (+) CVA:,             GI/Hepatic/Renal: Neg GI/Hepatic/Renal ROS            Endo/Other:    (+) DiabetesType II DM, using insulin, . Abdominal:           Vascular: negative vascular ROS. Anesthesia Plan      MAC     ASA 3 - emergent       Induction: intravenous. Anesthetic plan and risks discussed with patient. Plan discussed with CRNA.     Attending anesthesiologist reviewed and agrees with Shannon Cruz MD   1/2/2021

## 2021-01-02 NOTE — H&P
Interval H&P     Patient seen and evaluated on the floor this AM.  Patient has any melena hematochezia or hematemesis overnight. .  Reviewed changes noted. Vitals:    01/02/21 0030   BP: (!) 147/63   Pulse: 74   Resp: 16   Temp: 98.5 °F (36.9 °C)   SpO2: 96%        Plan: We will proceed with EGD this a.m. to evaluate patient for acute on chronic anemia history of alcohol abuse concern for possible cirrhosis.        Luis M Lawler DO   GI Fellow   7:44 AM

## 2021-01-03 LAB
ALBUMIN SERPL-MCNC: 2.2 G/DL (ref 3.5–5.2)
ALP BLD-CCNC: 94 U/L (ref 40–129)
ALT SERPL-CCNC: 57 U/L (ref 0–40)
ANION GAP SERPL CALCULATED.3IONS-SCNC: 11 MMOL/L (ref 7–16)
AST SERPL-CCNC: 37 U/L (ref 0–39)
BILIRUB SERPL-MCNC: 0.8 MG/DL (ref 0–1.2)
BILIRUBIN DIRECT: 0.5 MG/DL (ref 0–0.3)
BILIRUBIN, INDIRECT: 0.3 MG/DL (ref 0–1)
BUN BLDV-MCNC: 16 MG/DL (ref 8–23)
CALCIUM IONIZED: 1.18 MMOL/L (ref 1.15–1.33)
CALCIUM SERPL-MCNC: 7.8 MG/DL (ref 8.6–10.2)
CHLORIDE BLD-SCNC: 98 MMOL/L (ref 98–107)
CO2: 20 MMOL/L (ref 22–29)
CREAT SERPL-MCNC: 0.8 MG/DL (ref 0.7–1.2)
CULTURE, RESPIRATORY: NORMAL
GFR AFRICAN AMERICAN: >60
GFR NON-AFRICAN AMERICAN: >60 ML/MIN/1.73
GLUCOSE BLD-MCNC: 67 MG/DL (ref 74–99)
HCT VFR BLD CALC: 22.8 % (ref 37–54)
HCT VFR BLD CALC: 23.7 % (ref 37–54)
HCT VFR BLD CALC: 25.7 % (ref 37–54)
HEMOGLOBIN: 7.4 G/DL (ref 12.5–16.5)
HEMOGLOBIN: 7.6 G/DL (ref 12.5–16.5)
HEMOGLOBIN: 7.9 G/DL (ref 12.5–16.5)
MAGNESIUM: 1.8 MG/DL (ref 1.6–2.6)
MCH RBC QN AUTO: 28.4 PG (ref 26–35)
MCHC RBC AUTO-ENTMCNC: 32.5 % (ref 32–34.5)
MCV RBC AUTO: 87.4 FL (ref 80–99.9)
METER GLUCOSE: 155 MG/DL (ref 74–99)
METER GLUCOSE: 187 MG/DL (ref 74–99)
METER GLUCOSE: 190 MG/DL (ref 74–99)
METER GLUCOSE: 78 MG/DL (ref 74–99)
METER GLUCOSE: 80 MG/DL (ref 74–99)
PDW BLD-RTO: 16.4 FL (ref 11.5–15)
PHOSPHORUS: 3.2 MG/DL (ref 2.5–4.5)
PLATELET # BLD: 216 E9/L (ref 130–450)
PMV BLD AUTO: 9.6 FL (ref 7–12)
POTASSIUM SERPL-SCNC: 4.4 MMOL/L (ref 3.5–5)
RBC # BLD: 2.61 E12/L (ref 3.8–5.8)
SMEAR, RESPIRATORY: NORMAL
SODIUM BLD-SCNC: 129 MMOL/L (ref 132–146)
TOTAL PROTEIN: 5.1 G/DL (ref 6.4–8.3)
WBC # BLD: 12 E9/L (ref 4.5–11.5)

## 2021-01-03 PROCEDURE — 2580000003 HC RX 258: Performed by: INTERNAL MEDICINE

## 2021-01-03 PROCEDURE — 6370000000 HC RX 637 (ALT 250 FOR IP): Performed by: INTERNAL MEDICINE

## 2021-01-03 PROCEDURE — 83735 ASSAY OF MAGNESIUM: CPT

## 2021-01-03 PROCEDURE — 94660 CPAP INITIATION&MGMT: CPT

## 2021-01-03 PROCEDURE — 99232 SBSQ HOSP IP/OBS MODERATE 35: CPT | Performed by: INTERNAL MEDICINE

## 2021-01-03 PROCEDURE — 2700000000 HC OXYGEN THERAPY PER DAY

## 2021-01-03 PROCEDURE — 85027 COMPLETE CBC AUTOMATED: CPT

## 2021-01-03 PROCEDURE — 1200000000 HC SEMI PRIVATE

## 2021-01-03 PROCEDURE — C9113 INJ PANTOPRAZOLE SODIUM, VIA: HCPCS | Performed by: INTERNAL MEDICINE

## 2021-01-03 PROCEDURE — 80048 BASIC METABOLIC PNL TOTAL CA: CPT

## 2021-01-03 PROCEDURE — 82330 ASSAY OF CALCIUM: CPT

## 2021-01-03 PROCEDURE — 6360000002 HC RX W HCPCS: Performed by: INTERNAL MEDICINE

## 2021-01-03 PROCEDURE — 84100 ASSAY OF PHOSPHORUS: CPT

## 2021-01-03 PROCEDURE — 2500000003 HC RX 250 WO HCPCS: Performed by: INTERNAL MEDICINE

## 2021-01-03 PROCEDURE — 80076 HEPATIC FUNCTION PANEL: CPT

## 2021-01-03 PROCEDURE — 85014 HEMATOCRIT: CPT

## 2021-01-03 PROCEDURE — 85018 HEMOGLOBIN: CPT

## 2021-01-03 PROCEDURE — 36415 COLL VENOUS BLD VENIPUNCTURE: CPT

## 2021-01-03 PROCEDURE — 82962 GLUCOSE BLOOD TEST: CPT

## 2021-01-03 RX ORDER — LANOLIN ALCOHOL/MO/W.PET/CERES
9 CREAM (GRAM) TOPICAL NIGHTLY PRN
COMMUNITY

## 2021-01-03 RX ADMIN — LORAZEPAM 0.5 MG: 0.5 TABLET ORAL at 21:25

## 2021-01-03 RX ADMIN — METRONIDAZOLE 500 MG: 500 INJECTION, SOLUTION INTRAVENOUS at 11:26

## 2021-01-03 RX ADMIN — METRONIDAZOLE 500 MG: 500 INJECTION, SOLUTION INTRAVENOUS at 20:11

## 2021-01-03 RX ADMIN — OCTREOTIDE ACETATE 50 MCG/HR: 500 INJECTION, SOLUTION INTRAVENOUS; SUBCUTANEOUS at 02:19

## 2021-01-03 RX ADMIN — METRONIDAZOLE 500 MG: 500 INJECTION, SOLUTION INTRAVENOUS at 04:24

## 2021-01-03 RX ADMIN — OCTREOTIDE ACETATE 50 MCG/HR: 500 INJECTION, SOLUTION INTRAVENOUS; SUBCUTANEOUS at 20:11

## 2021-01-03 RX ADMIN — INSULIN LISPRO 2 UNITS: 100 INJECTION, SOLUTION INTRAVENOUS; SUBCUTANEOUS at 17:05

## 2021-01-03 RX ADMIN — Medication 10 ML: at 21:27

## 2021-01-03 RX ADMIN — INSULIN GLARGINE 24 UNITS: 100 INJECTION, SOLUTION SUBCUTANEOUS at 21:27

## 2021-01-03 RX ADMIN — Medication 1 G: at 17:04

## 2021-01-03 RX ADMIN — PANTOPRAZOLE SODIUM 40 MG: 40 INJECTION, POWDER, LYOPHILIZED, FOR SOLUTION INTRAVENOUS at 21:25

## 2021-01-03 RX ADMIN — Medication 10 ML: at 08:46

## 2021-01-03 RX ADMIN — ATORVASTATIN CALCIUM 40 MG: 20 TABLET, FILM COATED ORAL at 21:25

## 2021-01-03 RX ADMIN — PANTOPRAZOLE SODIUM 40 MG: 40 INJECTION, POWDER, LYOPHILIZED, FOR SOLUTION INTRAVENOUS at 08:46

## 2021-01-03 RX ADMIN — Medication 1 G: at 11:59

## 2021-01-03 RX ADMIN — LEVOFLOXACIN 500 MG: 5 INJECTION, SOLUTION INTRAVENOUS at 17:04

## 2021-01-03 ASSESSMENT — PAIN SCALES - GENERAL: PAINLEVEL_OUTOF10: 0

## 2021-01-03 NOTE — PROGRESS NOTES
Department of Internal Medicine  General Internal Medicine  Attending Progress Note      SUBJECTIVE:     Has noted that he is doing good denies fever chills no nausea no vomiting no chest pain. Aware plans to have EGD done.     OBJECTIVE      Medications    Current Facility-Administered Medications: sodium chloride tablet 1 g, 1 g, Oral, TID WC  LORazepam (ATIVAN) tablet 0.5 mg, 0.5 mg, Oral, TID PRN  levoFLOXacin (LEVAQUIN) 500 MG/100ML infusion 500 mg, 500 mg, Intravenous, Q24H  vitamin D (ERGOCALCIFEROL) capsule 50,000 Units, 50,000 Units, Oral, Weekly  insulin glargine (LANTUS) injection vial 24 Units, 0.25 Units/kg, Subcutaneous, Nightly  0.9 % sodium chloride bolus, 20 mL, Intravenous, Once  octreotide (SANDOSTATIN) 500 mcg in sodium chloride 0.9 % 100 mL infusion, 50 mcg/hr, Intravenous, Continuous  lidocaine PF 1 % injection 5 mL, 5 mL, Intradermal, Once  sodium chloride flush 0.9 % injection 10 mL, 10 mL, Intravenous, PRN  heparin flush 100 UNIT/ML injection 300 Units, 3 mL, Intravenous, 2 times per day  heparin flush 100 UNIT/ML injection 300 Units, 3 mL, Intracatheter, PRN  ipratropium-albuterol (DUONEB) nebulizer solution 1 ampule, 1 ampule, Inhalation, Q4H  metronidazole (FLAGYL) 500 mg in NaCl 100 mL IVPB premix, 500 mg, Intravenous, Q8H  glucose (GLUTOSE) 40 % oral gel 15 g, 15 g, Oral, PRN  dextrose 50 % IV solution, 12.5 g, Intravenous, PRN  glucagon (rDNA) injection 1 mg, 1 mg, Intramuscular, PRN  dextrose 5 % solution, 100 mL/hr, Intravenous, PRN  pantoprazole (PROTONIX) injection 40 mg, 40 mg, Intravenous, BID  insulin lispro (HUMALOG) injection vial 0-12 Units, 0-12 Units, Subcutaneous, Q6H  [Held by provider] aspirin EC tablet 81 mg, 81 mg, Oral, Daily  atorvastatin (LIPITOR) tablet 40 mg, 40 mg, Oral, Nightly  [Held by provider] clopidogrel (PLAVIX) tablet 75 mg, 75 mg, Oral, Daily  thiamine mononitrate tablet 100 mg, 100 mg, Oral, Daily sodium chloride flush 0.9 % injection 10 mL, 10 mL, Intravenous, 2 times per day  enoxaparin (LOVENOX) injection 40 mg, 40 mg, Subcutaneous, Daily  promethazine (PHENERGAN) tablet 12.5 mg, 12.5 mg, Oral, Q6H PRN **OR** ondansetron (ZOFRAN) injection 4 mg, 4 mg, Intravenous, Q6H PRN  polyethylene glycol (GLYCOLAX) packet 17 g, 17 g, Oral, Daily PRN  acetaminophen (TYLENOL) tablet 650 mg, 650 mg, Oral, Q6H PRN **OR** acetaminophen (TYLENOL) suppository 650 mg, 650 mg, Rectal, Q6H PRN  Physical    VITALS:  /62   Pulse 88   Temp 99.9 °F (37.7 °C) (Oral)   Resp 18   Ht 5' 10\" (1.778 m)   Wt 208 lb 1.6 oz (94.4 kg)   SpO2 98%   BMI 29.86 kg/m²   CONSTITUTIONAL:  awake, alert, cooperative, no apparent distress, and appears stated age  EYES:  Lids and lashes normal, pupils equal, round and reactive to light, extra ocular muscles intact, sclera clear, conjunctiva normal  ENT:  normocepalic, without obvious abnormality  NECK:  supple, symmetrical, trachea midline  HEMATOLOGIC/LYMPHATICS:  no cervical lymphadenopathy  BACK:  symmetric  LUNGS:  No increased work of breathing, good air exchange, clear to auscultation bilaterally, no crackles or wheezing  CARDIOVASCULAR:  normal apical pulses  ABDOMEN:  No scars, normal bowel sounds, soft, non-distended, non-tender, no masses palpated, no hepatosplenomegally  MUSCULOSKELETAL:  there is no redness, warmth, or swelling of the joints  NEUROLOGIC:  Awake, alert, oriented to name, place and time.     SKIN:  no bruising or bleeding  Data    CBC:   Lab Results   Component Value Date    WBC 12.0 01/03/2021    RBC 2.61 01/03/2021    HGB 7.6 01/03/2021    HCT 23.7 01/03/2021    MCV 87.4 01/03/2021    MCH 28.4 01/03/2021    MCHC 32.5 01/03/2021    RDW 16.4 01/03/2021     01/03/2021    MPV 9.6 01/03/2021     BMP:    Lab Results   Component Value Date     01/03/2021    K 4.4 01/03/2021    K 3.9 01/01/2021    CL 98 01/03/2021    CO2 20 01/03/2021    BUN 16 01/03/2021 LABALBU 2.2 01/03/2021    CREATININE 0.8 01/03/2021    CALCIUM 7.8 01/03/2021    GFRAA >60 01/03/2021    LABGLOM >60 01/03/2021    GLUCOSE 67 01/03/2021       ASSESSMENT AND PLAN      1. Hyponatremia: Etiology unknown but suspected to be multifactorial  Continue sodium tablets. Check BMP in the morning    2. History of CVA (cerebral vascular accident) [facility]):  PT OT at discharge  Plan to discharge to rehab    3. Diabetes mellitus ([facility] [address]): Well-controlled  Continue current insulin regimen    4. Alcohol abuse:    5. Anemia: Etiology of this is unknown. EGD to be performed by GI from patient sodium has improved. BMP in a.m. Hemoglobin has been stable since yesterday.

## 2021-01-03 NOTE — PROGRESS NOTES
Nephrology Progress Note    1/3/2021: pt somnolent and arouses with ease, no new complaints    Vital SignsBP 132/62   Pulse 88   Temp 99.9 °F (37.7 °C) (Oral)   Resp 18   Ht 5' 10\" (1.778 m)   Wt 208 lb 1.6 oz (94.4 kg)   SpO2 98%   BMI 29.86 kg/m²   24HR INTAKE/OUTPUT:      Intake/Output Summary (Last 24 hours) at 1/3/2021 1549  Last data filed at 1/3/2021 1443  Gross per 24 hour   Intake 495 ml   Output 1500 ml   Net -1005 ml     Physical Exam    Neck: No JVD  Lungs: Breath sounds decreased at the bases. A few expiratory rhonchi present. Heart: Regular rate and rhythm. No S3 gallop. No  murmrur. Abdomen: Soft non distended, non tender and normal bowel sounds. Extremeties: No edema.     ROS:  RESPIRATORY: (-) for shortness of breath  CARDIOVASCULAR:  negative  GASTROINTESTINAL:  negative  GENITOURINARY:  negative    Current Facility-Administered Medications   Medication Dose Route Frequency Provider Last Rate Last Admin    sodium chloride tablet 1 g  1 g Oral TID  Bhanu Wise MD   1 g at 01/03/21 1159    LORazepam (ATIVAN) tablet 0.5 mg  0.5 mg Oral TID PRN Amy Stoddard MD   0.5 mg at 12/31/20 0909    levoFLOXacin (LEVAQUIN) 500 MG/100ML infusion 500 mg  500 mg Intravenous Q24H Amy Stoddard MD   Stopped at 01/02/21 1610    vitamin D (ERGOCALCIFEROL) capsule 50,000 Units  50,000 Units Oral Weekly Perri Burns MD   50,000 Units at 12/29/20 1605    insulin glargine (LANTUS) injection vial 24 Units  0.25 Units/kg Subcutaneous Nightly Amy Stoddard MD   24 Units at 01/01/21 2136    0.9 % sodium chloride bolus  20 mL Intravenous Once Elyssa Pfeiffer,         octreotide (SANDOSTATIN) 500 mcg in sodium chloride 0.9 % 100 mL infusion  50 mcg/hr Intravenous Continuous Damion Coulter DO 10 mL/hr at 01/03/21 0219 50 mcg/hr at 01/03/21 0219    lidocaine PF 1 % injection 5 mL  5 mL Intradermal Once Amy Stoddard MD  sodium chloride flush 0.9 % injection 10 mL  10 mL Intravenous PRN Gloria Noel MD        heparin flush 100 UNIT/ML injection 300 Units  3 mL Intravenous 2 times per day Gloria Noel MD        heparin flush 100 UNIT/ML injection 300 Units  3 mL Intracatheter PRN Gloria Noel MD        ipratropium-albuterol (DUONEB) nebulizer solution 1 ampule  1 ampule Inhalation Q4H Gloria Noel MD   1 ampule at 12/31/20 0931    metronidazole (FLAGYL) 500 mg in NaCl 100 mL IVPB premix  500 mg Intravenous Q8H Gloria Noel MD   Stopped at 01/03/21 1232    glucose (GLUTOSE) 40 % oral gel 15 g  15 g Oral PRN Gloria Noel MD        dextrose 50 % IV solution  12.5 g Intravenous PRN Gloria Noel MD   12.5 g at 01/02/21 0848    glucagon (rDNA) injection 1 mg  1 mg Intramuscular PRN Gloria Noel MD        dextrose 5 % solution  100 mL/hr Intravenous PRN Gloria Noel MD        pantoprazole (PROTONIX) injection 40 mg  40 mg Intravenous BID Damir Valero DO   40 mg at 01/03/21 0846    insulin lispro (HUMALOG) injection vial 0-12 Units  0-12 Units Subcutaneous Q6H Gloria Noel MD   2 Units at 01/02/21 1705    [Held by provider] aspirin EC tablet 81 mg  81 mg Oral Daily Martinez Walters MD   81 mg at 12/26/20 0902    atorvastatin (LIPITOR) tablet 40 mg  40 mg Oral Nightly Martinez Walters MD   40 mg at 01/02/21 2151    [Held by provider] clopidogrel (PLAVIX) tablet 75 mg  75 mg Oral Daily Martinez Walters MD   75 mg at 12/26/20 0902    thiamine mononitrate tablet 100 mg  100 mg Oral Daily Carlos Don MD   Stopped at 01/03/21 0847    sodium chloride flush 0.9 % injection 10 mL  10 mL Intravenous 2 times per day Carlos Don MD   10 mL at 01/03/21 0846    enoxaparin (LOVENOX) injection 40 mg  40 mg Subcutaneous Daily Martinez Walters MD   Stopped at 01/03/21 0845    promethazine (PHENERGAN) tablet 12.5 mg  12.5 mg Oral Q6H PRN Martinez Walters MD        Or  ondansetron (ZOFRAN) injection 4 mg  4 mg Intravenous Q6H PRN Martinez Walters MD   4 mg at 01/01/21 0339    polyethylene glycol (GLYCOLAX) packet 17 g  17 g Oral Daily PRN Martinez Walters MD        acetaminophen (TYLENOL) tablet 650 mg  650 mg Oral Q6H PRN Martinez Walters MD   650 mg at 01/01/21 1347    Or    acetaminophen (TYLENOL) suppository 650 mg  650 mg Rectal Q6H PRN Martinez Walters MD   650 mg at 12/26/20 2325       CT CHEST WO CONTRAST   Final Result   Scattered, patchy ground-glass and consolidative airspace opacities   throughout both lungs, most pronounced in the lower lobes bilaterally. Small   bilateral pleural effusions are present as well. Findings are consistent   with multifocal pneumonia. Viral/atypical pneumonia not excluded. Hepatic steatosis. Small volume of free fluid visualized along the anterior pararenal space   bilaterally. This is of uncertain etiology or clinical significance. Consider correlation with serum lipase levels. CT of the abdomen and pelvis   could be considered as well, if clinically appropriate. XR CHEST PORTABLE   Final Result   In consideration to differences in lung volumes, no significant interval   change compared to 1 day prior. XR CHEST PORTABLE   Final Result   Continued bilateral pulmonary airspace infiltrates, with mild improved   aeration of the right lung base. US ABDOMEN LIMITED   Final Result   Mildly enlarged fatty liver. XR CHEST PORTABLE   Final Result   1. No interval change in the significant multifocal bilateral perihilar and   lower lobe infiltrates. XR CHEST PORTABLE   Final Result   Bilateral pulmonary infiltrates no significant interval change      XR CHEST PORTABLE   Final Result   Left basilar pulmonary opacity which may represent atelectasis, pneumonia,   effusion or a combination thereof. XR SHOULDER LEFT (MIN 2 VIEWS)   Final Result   No acute fracture or dislocation in the left shoulder.       XR CHEST (2 VW) Final Result   No acute cardiopulmonary process. Labs:    CBC:   Recent Labs     01/02/21  2046 01/03/21  0440 01/03/21  0845   WBC  --  12.0*  --    HGB 7.7* 7.4* 7.6*   PLT  --  216  --         Lab Results   Component Value Date    IRON 17 (L) 12/28/2020    TIBC 232 (L) 12/28/2020    FERRITIN 125 12/28/2020       Lab Results   Component Value Date    PTH 60 12/28/2020    CALCIUM 7.8 (L) 01/03/2021    CALCIUM 7.5 (L) 01/02/2021    CALCIUM 7.6 (L) 01/01/2021    CAION 1.18 01/03/2021    CAION 1.16 12/30/2020    CAION 1.17 12/29/2020    PHOS 3.2 01/03/2021    PHOS 2.6 12/28/2020    PHOS 3.7 11/12/2015    MG 1.8 01/03/2021    MG 2.0 12/30/2020    MG 2.2 12/30/2020       BMP:   Recent Labs     01/01/21  0529 01/02/21  0829 01/03/21  0440   * 128* 129*   K 3.9 4.3 4.4   CL 94* 100 98   CO2 26 23 20*   BUN 14 16 16   CREATININE 0.8 0.9 0.8   GLUCOSE 80 72* 67*     Assessment: / Plan:    1. Severe hyponatremia. Hyponatremia believed to be secondary to multiple factors including thiazide diuretic use, significant history of alcohol abuse in the form of beer limiting free water excretion due to low solute load as well as possibility of underlying SIADH. S/P 1 dose dDAVP on 12/28 at 2300  Na+ up to 129 this AM  PLAN:1.Follow Na+    2. Acute kidney injury. FeNa <1% suggesting possible component of decreased effective renal perfusion in the setting of the hypotension  Cr stable at 0.8 mg/dl  UOP 1200mL in last 24 hours  PLAN:1. Continue to monitor    3. Metabolic acidosis with increased anion gap. Patient with a lactic acidosis possible Type A secondary to tissue hypoperfusion as well as a possible Type B due to  use of Metformin. Lactic Acid down from 6.3-->2.5-->2.2->2.0=>Resolved  PLAN:1. Continue off Metformin     4. Hypotension. Resolved  PLAN:1.Monitor    5. Microcytic Anemia.     Hgb stable at 7.6 S/P 1 unit PRBC 12/28/20  Ferritin 125 with an iron sat 7% PLAN:1. Would treat with IV Fe++ once infection cleared and pt off antibx    6. Hypocalcemia.  In the setting of hypoalbumionemia   Last Ionized Ca++ 1.18 WNL  PTH 60  Unspecified Vit D Def  With Vit D 12  PLAN:1.Continue Vit D supplement    Fiordaliza Borja MD  1/1/2021  7:40 AM

## 2021-01-03 NOTE — PROGRESS NOTES
PROGRESS NOTE    By Damion Coulter D.O GI Fellow    [facility]  Dr. Genevieve Aparicio MD, Dr. Teddy Schaumann, MD, Dr Quiana Clarke, Dr. Cayla Reyna MD, Dr. Elyssa Pfeiffer, DO Margareth Man  63 y.o.  male    SUBJECTIVE:  Patient alert and oriented x3 this AM.  He denies any hematemesis or melena      OBJECTIVE:    /62   Pulse 88   Temp 99.9 °F (37.7 °C) (Oral)   Resp 18   Ht 5' 10\" (1.778 m)   Wt 208 lb 1.6 oz (94.4 kg)   SpO2 98%   BMI 29.86 kg/m²     Gen: NAD, alert oriented x3  HEENT:PEERL, no icterus  Heart: RRR, no M/R/G  Lungs: CTAB  Abd.: soft, NT, ND, BS +, no G/R, no HSM  Extr.:  No lower extremity edema present patient with ecchymosis upper and lower extremities       Lab Results   Component Value Date    WBC 12.0 01/03/2021    WBC 14.6 12/31/2020    WBC 12.2 12/30/2020    HGB 7.6 01/03/2021    HGB 7.4 01/03/2021    HGB 7.7 01/02/2021    HCT 23.7 01/03/2021    MCV 87.4 01/03/2021    RDW 16.4 01/03/2021     01/03/2021     12/31/2020     12/30/2020     Lab Results   Component Value Date     01/03/2021    K 4.4 01/03/2021    K 3.9 01/01/2021    CL 98 01/03/2021    CO2 20 01/03/2021    BUN 16 01/03/2021    CREATININE 0.8 01/03/2021    CALCIUM 7.8 01/03/2021    PROT 5.1 01/03/2021    LABALBU 2.2 01/03/2021    BILITOT 0.8 01/03/2021    BILITOT 1.0 01/02/2021    BILITOT 1.3 01/01/2021    ALKPHOS 94 01/03/2021    ALKPHOS 91 01/02/2021    ALKPHOS 79 01/01/2021    AST 37 01/03/2021    AST 43 01/02/2021    AST 61 01/01/2021    ALT 57 01/03/2021    ALT 79 01/02/2021     01/01/2021     Lab Results   Component Value Date    LIPASE 40 12/27/2020     Lab Results   Component Value Date    AMYLASE 205 12/27/2020         ASSESSMENT/PLAN:    1.   Acute on chronic microcytic anemia/possible coffee-ground emesis  - No overt hematemesis/melena  - Type and Cross 2 units on hold/q 8 Hgb transfuse per admitting - Continue to hold Plavix and Aspirin if feasible per admitting    - Continue with Protonix IV BID, continue with octreotide 50 MCG's per hour   - Hgb Stable this a.m.  - Clear liquid diet today   - Plan is for EGD once there is resolution of hyponatremia  - Continue supportive care    2. Elevated Liver transaminases-trending down  - Possibly secondary to patient's hypotension ischemic hepatitis  - R Factor 17.1 indicating hepatocellular injury  - acute hepatitis panel, MERLIN, Igg all negative   - RUQ US with mildly enlarged fatty liver  - Montior with daily CMP   - Maintain adequate maps greater than 65    3. Severe Hyponatremia- improving  - Na 102 on admission   - Per nephrology/admitting    Patient with no overt signs of GI bleed on exam.  Patient's hemoglobin is remained stable over last 24 hours 7.7-->7.6 this am. Continue with Protonix and octreotide infusions (started ~12/28/20). EGD was held this am due to hyponatremia. Okay for diet today. Tentatively plan EGD 1/4's anesthesia would ideally Na+ 130 prior to nonemergent endoscopic evaluation. Will need to discuss with Anesthesia tomorrow. Pt was discussed with Dr. Brandon Modi, DO  GI Fellow   1/3/2021      The case was d/w the GI Fellow. I have augmented/agree with the above assessment/plan. Plan is for EGD once there is resolution of hyponatremia, ideally prior to d/c and hopefully tomorrow. No outward bleeding, hgb holding ~7.5, VSS. Last transfusion 12/282020 with adequate response.     Jc Eaton D.O.   01/03/21  2:19 PM

## 2021-01-04 ENCOUNTER — ANESTHESIA (OUTPATIENT)
Dept: ENDOSCOPY | Age: 61
DRG: 871 | End: 2021-01-04
Payer: MEDICARE

## 2021-01-04 ENCOUNTER — ANESTHESIA EVENT (OUTPATIENT)
Dept: ENDOSCOPY | Age: 61
DRG: 871 | End: 2021-01-04
Payer: MEDICARE

## 2021-01-04 VITALS — SYSTOLIC BLOOD PRESSURE: 138 MMHG | OXYGEN SATURATION: 93 % | DIASTOLIC BLOOD PRESSURE: 73 MMHG

## 2021-01-04 LAB
ALBUMIN SERPL-MCNC: 2.4 G/DL (ref 3.5–5.2)
ALP BLD-CCNC: 100 U/L (ref 40–129)
ALT SERPL-CCNC: 46 U/L (ref 0–40)
ANION GAP SERPL CALCULATED.3IONS-SCNC: 10 MMOL/L (ref 7–16)
AST SERPL-CCNC: 35 U/L (ref 0–39)
BILIRUB SERPL-MCNC: 0.8 MG/DL (ref 0–1.2)
BILIRUBIN DIRECT: 0.4 MG/DL (ref 0–0.3)
BILIRUBIN, INDIRECT: 0.4 MG/DL (ref 0–1)
BUN BLDV-MCNC: 14 MG/DL (ref 8–23)
CALCIUM IONIZED: 1.16 MMOL/L (ref 1.15–1.33)
CALCIUM SERPL-MCNC: 7.8 MG/DL (ref 8.6–10.2)
CHLORIDE BLD-SCNC: 99 MMOL/L (ref 98–107)
CO2: 22 MMOL/L (ref 22–29)
CREAT SERPL-MCNC: 0.8 MG/DL (ref 0.7–1.2)
GFR AFRICAN AMERICAN: >60
GFR NON-AFRICAN AMERICAN: >60 ML/MIN/1.73
GLUCOSE BLD-MCNC: 80 MG/DL (ref 74–99)
HCT VFR BLD CALC: 24.3 % (ref 37–54)
HCT VFR BLD CALC: 26 % (ref 37–54)
HEMOGLOBIN: 7.8 G/DL (ref 12.5–16.5)
HEMOGLOBIN: 8.1 G/DL (ref 12.5–16.5)
MAGNESIUM: 1.8 MG/DL (ref 1.6–2.6)
MCH RBC QN AUTO: 28 PG (ref 26–35)
MCHC RBC AUTO-ENTMCNC: 32.1 % (ref 32–34.5)
MCV RBC AUTO: 87.1 FL (ref 80–99.9)
METER GLUCOSE: 115 MG/DL (ref 74–99)
METER GLUCOSE: 196 MG/DL (ref 74–99)
METER GLUCOSE: 77 MG/DL (ref 74–99)
METER GLUCOSE: 85 MG/DL (ref 74–99)
METER GLUCOSE: 86 MG/DL (ref 74–99)
PDW BLD-RTO: 16.1 FL (ref 11.5–15)
PHOSPHORUS: 3 MG/DL (ref 2.5–4.5)
PLATELET # BLD: 240 E9/L (ref 130–450)
PMV BLD AUTO: 9.7 FL (ref 7–12)
POTASSIUM SERPL-SCNC: 4.5 MMOL/L (ref 3.5–5)
RBC # BLD: 2.79 E12/L (ref 3.8–5.8)
SARS-COV-2, NAAT: NOT DETECTED
SODIUM BLD-SCNC: 131 MMOL/L (ref 132–146)
TOTAL PROTEIN: 5.3 G/DL (ref 6.4–8.3)
WBC # BLD: 12.7 E9/L (ref 4.5–11.5)

## 2021-01-04 PROCEDURE — U0002 COVID-19 LAB TEST NON-CDC: HCPCS

## 2021-01-04 PROCEDURE — 3609012400 HC EGD TRANSORAL BIOPSY SINGLE/MULTIPLE: Performed by: INTERNAL MEDICINE

## 2021-01-04 PROCEDURE — 99232 SBSQ HOSP IP/OBS MODERATE 35: CPT | Performed by: INTERNAL MEDICINE

## 2021-01-04 PROCEDURE — 2580000003 HC RX 258: Performed by: SURGERY

## 2021-01-04 PROCEDURE — 6360000002 HC RX W HCPCS: Performed by: INTERNAL MEDICINE

## 2021-01-04 PROCEDURE — 2700000000 HC OXYGEN THERAPY PER DAY

## 2021-01-04 PROCEDURE — 36415 COLL VENOUS BLD VENIPUNCTURE: CPT

## 2021-01-04 PROCEDURE — 83735 ASSAY OF MAGNESIUM: CPT

## 2021-01-04 PROCEDURE — 3700000000 HC ANESTHESIA ATTENDED CARE: Performed by: INTERNAL MEDICINE

## 2021-01-04 PROCEDURE — 6370000000 HC RX 637 (ALT 250 FOR IP): Performed by: INTERNAL MEDICINE

## 2021-01-04 PROCEDURE — 3700000001 HC ADD 15 MINUTES (ANESTHESIA): Performed by: INTERNAL MEDICINE

## 2021-01-04 PROCEDURE — 82962 GLUCOSE BLOOD TEST: CPT

## 2021-01-04 PROCEDURE — 80076 HEPATIC FUNCTION PANEL: CPT

## 2021-01-04 PROCEDURE — 2580000003 HC RX 258: Performed by: NURSE ANESTHETIST, CERTIFIED REGISTERED

## 2021-01-04 PROCEDURE — 80048 BASIC METABOLIC PNL TOTAL CA: CPT

## 2021-01-04 PROCEDURE — 82330 ASSAY OF CALCIUM: CPT

## 2021-01-04 PROCEDURE — 2500000003 HC RX 250 WO HCPCS: Performed by: INTERNAL MEDICINE

## 2021-01-04 PROCEDURE — 1200000000 HC SEMI PRIVATE

## 2021-01-04 PROCEDURE — 7100000011 HC PHASE II RECOVERY - ADDTL 15 MIN: Performed by: INTERNAL MEDICINE

## 2021-01-04 PROCEDURE — 7100000010 HC PHASE II RECOVERY - FIRST 15 MIN: Performed by: INTERNAL MEDICINE

## 2021-01-04 PROCEDURE — 6360000002 HC RX W HCPCS: Performed by: SURGERY

## 2021-01-04 PROCEDURE — 2500000003 HC RX 250 WO HCPCS: Performed by: SURGERY

## 2021-01-04 PROCEDURE — 85027 COMPLETE CBC AUTOMATED: CPT

## 2021-01-04 PROCEDURE — 6370000000 HC RX 637 (ALT 250 FOR IP): Performed by: SURGERY

## 2021-01-04 PROCEDURE — 0DB68ZX EXCISION OF STOMACH, VIA NATURAL OR ARTIFICIAL OPENING ENDOSCOPIC, DIAGNOSTIC: ICD-10-PCS | Performed by: INTERNAL MEDICINE

## 2021-01-04 PROCEDURE — 6360000002 HC RX W HCPCS: Performed by: NURSE ANESTHETIST, CERTIFIED REGISTERED

## 2021-01-04 PROCEDURE — 2580000003 HC RX 258: Performed by: INTERNAL MEDICINE

## 2021-01-04 PROCEDURE — C9113 INJ PANTOPRAZOLE SODIUM, VIA: HCPCS | Performed by: INTERNAL MEDICINE

## 2021-01-04 PROCEDURE — 85018 HEMOGLOBIN: CPT

## 2021-01-04 PROCEDURE — 94660 CPAP INITIATION&MGMT: CPT

## 2021-01-04 PROCEDURE — 2709999900 HC NON-CHARGEABLE SUPPLY: Performed by: INTERNAL MEDICINE

## 2021-01-04 PROCEDURE — 88305 TISSUE EXAM BY PATHOLOGIST: CPT

## 2021-01-04 PROCEDURE — 85014 HEMATOCRIT: CPT

## 2021-01-04 PROCEDURE — 84100 ASSAY OF PHOSPHORUS: CPT

## 2021-01-04 RX ORDER — PROPOFOL 10 MG/ML
INJECTION, EMULSION INTRAVENOUS CONTINUOUS PRN
Status: DISCONTINUED | OUTPATIENT
Start: 2021-01-04 | End: 2021-01-04 | Stop reason: SDUPTHER

## 2021-01-04 RX ORDER — PANTOPRAZOLE SODIUM 40 MG/1
40 TABLET, DELAYED RELEASE ORAL
Status: DISCONTINUED | OUTPATIENT
Start: 2021-01-05 | End: 2021-01-05 | Stop reason: HOSPADM

## 2021-01-04 RX ORDER — SODIUM CHLORIDE 9 MG/ML
INJECTION, SOLUTION INTRAVENOUS CONTINUOUS PRN
Status: DISCONTINUED | OUTPATIENT
Start: 2021-01-04 | End: 2021-01-04 | Stop reason: SDUPTHER

## 2021-01-04 RX ADMIN — METRONIDAZOLE 500 MG: 500 INJECTION, SOLUTION INTRAVENOUS at 11:51

## 2021-01-04 RX ADMIN — Medication 10 ML: at 19:54

## 2021-01-04 RX ADMIN — LEVOFLOXACIN 500 MG: 5 INJECTION, SOLUTION INTRAVENOUS at 16:26

## 2021-01-04 RX ADMIN — ATORVASTATIN CALCIUM 40 MG: 20 TABLET, FILM COATED ORAL at 19:53

## 2021-01-04 RX ADMIN — INSULIN GLARGINE 24 UNITS: 100 INJECTION, SOLUTION SUBCUTANEOUS at 19:53

## 2021-01-04 RX ADMIN — THIAMINE HCL TAB 100 MG 100 MG: 100 TAB at 09:33

## 2021-01-04 RX ADMIN — LORAZEPAM 0.5 MG: 0.5 TABLET ORAL at 06:13

## 2021-01-04 RX ADMIN — Medication 1 G: at 08:23

## 2021-01-04 RX ADMIN — PROPOFOL 100 MCG/KG/MIN: 10 INJECTION, EMULSION INTRAVENOUS at 14:46

## 2021-01-04 RX ADMIN — PANTOPRAZOLE SODIUM 40 MG: 40 INJECTION, POWDER, LYOPHILIZED, FOR SOLUTION INTRAVENOUS at 09:33

## 2021-01-04 RX ADMIN — METRONIDAZOLE 500 MG: 500 INJECTION, SOLUTION INTRAVENOUS at 04:36

## 2021-01-04 RX ADMIN — OCTREOTIDE ACETATE 50 MCG/HR: 500 INJECTION, SOLUTION INTRAVENOUS; SUBCUTANEOUS at 06:04

## 2021-01-04 RX ADMIN — METRONIDAZOLE 500 MG: 500 INJECTION, SOLUTION INTRAVENOUS at 19:53

## 2021-01-04 RX ADMIN — LORAZEPAM 0.5 MG: 0.5 TABLET ORAL at 20:51

## 2021-01-04 RX ADMIN — SODIUM CHLORIDE: 9 INJECTION, SOLUTION INTRAVENOUS at 14:46

## 2021-01-04 RX ADMIN — LORAZEPAM 0.5 MG: 0.5 TABLET ORAL at 12:43

## 2021-01-04 ASSESSMENT — PAIN SCALES - GENERAL
PAINLEVEL_OUTOF10: 0

## 2021-01-04 NOTE — ANESTHESIA PRE PROCEDURE
Department of Anesthesiology  Preprocedure Note       Name:  Carmen Diaz   Age:  63 y.o.  :  1960                                          MRN:  [id number]         Date:  2021      Surgeon: Eva Hatch):  Fabrice Rangel DO    Procedure: Procedure(s):  EGD ESOPHAGOGASTRODUODENOSCOPY    Medications prior to admission:   Prior to Admission medications    Medication Sig Start Date End Date Taking? Authorizing Provider   melatonin 3 MG TABS tablet Take 9 mg by mouth nightly as needed    Historical Provider, MD   aspirin 81 MG EC tablet Take 81 mg by mouth daily    Historical Provider, MD   hydroCHLOROthiazide (HYDRODIURIL) 25 MG tablet Take 25 mg by mouth daily    Historical Provider, MD   acetaminophen 650 MG TABS Take 650 mg by mouth every 4 hours as needed 11/16/15   Tierra Khan,    glimepiride (AMARYL) 2 MG tablet Take 1 tablet by mouth daily (with breakfast) 11/16/15   Tierra Khan, DO   insulin lispro (HUMALOG) 100 UNIT/ML injection vial Inject 0-12 Units into the skin 3 times daily (with meals)  Patient not taking: Reported on 2020 11/16/15   Tierra Khan DO   insulin lispro (HUMALOG) 100 UNIT/ML injection vial Inject 0-6 Units into the skin nightly  Patient not taking: Reported on 2020 11/16/15   Tierra Khan DO   metFORMIN (GLUCOPHAGE) 500 MG tablet Take 1 tablet by mouth 2 times daily (with meals) 11/16/15   Tierra Khan, DO   atorvastatin (LIPITOR) 40 MG tablet Take 1 tablet by mouth nightly 11/16/15   Tierra Khan, DO   clopidogrel (PLAVIX) 75 MG tablet Take 1 tablet by mouth daily  Patient not taking: Reported on 2020 11/16/15   Tierra Khan DO   vitamin B-1 100 MG tablet Take 1 tablet by mouth daily  Patient not taking: Reported on 2020 11/16/15   Tierra Khan DO       Current medications:    No current facility-administered medications for this visit. No current outpatient medications on file. Facility-Administered Medications Ordered in Other Visits   Medication Dose Route Frequency Provider Last Rate Last Admin    LORazepam (ATIVAN) tablet 0.5 mg  0.5 mg Oral TID PRN Jeff Gonsalves MD   0.5 mg at 01/04/21 1243    levoFLOXacin (LEVAQUIN) 500 MG/100ML infusion 500 mg  500 mg Intravenous Q24H Jeff Gonsalves MD   Stopped at 01/03/21 1810    vitamin D (ERGOCALCIFEROL) capsule 50,000 Units  50,000 Units Oral Weekly Dinora Alvarado MD   50,000 Units at 12/29/20 1605    insulin glargine (LANTUS) injection vial 24 Units  0.25 Units/kg Subcutaneous Nightly Jeff Gonsalves MD   24 Units at 01/03/21 2127    0.9 % sodium chloride bolus  20 mL Intravenous Once Navid Ni DO        octreotide (SANDOSTATIN) 500 mcg in sodium chloride 0.9 % 100 mL infusion  50 mcg/hr Intravenous Continuous Nicholas Champion DO 10 mL/hr at 01/04/21 0604 50 mcg/hr at 01/04/21 0604    lidocaine PF 1 % injection 5 mL  5 mL Intradermal Once Jeff Gonsalves MD        sodium chloride flush 0.9 % injection 10 mL  10 mL Intravenous PRN Jeff Gonsalves MD        heparin flush 100 UNIT/ML injection 300 Units  3 mL Intravenous 2 times per day Jeff Gonsalves MD        heparin flush 100 UNIT/ML injection 300 Units  3 mL Intracatheter PRN Jeff Gonsalves MD        ipratropium-albuterol (DUONEB) nebulizer solution 1 ampule  1 ampule Inhalation Q4H Jeff Gonsalves MD   1 ampule at 12/31/20 0931    metronidazole (FLAGYL) 500 mg in NaCl 100 mL IVPB premix  500 mg Intravenous Q8H Jeff Gonsalves  mL/hr at 01/04/21 1151 500 mg at 01/04/21 1151    glucose (GLUTOSE) 40 % oral gel 15 g  15 g Oral PRN Jeff Gonsalves MD        dextrose 50 % IV solution  12.5 g Intravenous PRN Jeff Gonsalves MD   12.5 g at 01/02/21 0848    glucagon (rDNA) injection 1 mg  1 mg Intramuscular PRN Jeff Gonsalves MD        dextrose 5 % solution  100 mL/hr Intravenous PRSANDRA Gonsalves MD  TONSILLECTOMY         Social History:    Social History     Tobacco Use    Smoking status: Former Smoker    Smokeless tobacco: Never Used   Substance Use Topics    Alcohol use: Yes     Frequency: 4 or more times a week     Drinks per session: 5 or 6     Binge frequency: Daily or almost daily     Comment: every day drinker for most of adult life                                Counseling given: Not Answered      Vital Signs (Current): There were no vitals filed for this visit. BP Readings from Last 3 Encounters:   01/04/21 (!) 160/77   06/15/20 (!) 153/82   11/18/15 137/76       NPO Status:                           GREATER THAN 8 HOURS                                                      BMI:   Wt Readings from Last 3 Encounters:   12/26/20 208 lb 1.6 oz (94.4 kg)   06/18/20 194 lb (88 kg)   06/15/20 194 lb (88 kg)     There is no height or weight on file to calculate BMI.    CBC:   Lab Results   Component Value Date    WBC 12.7 01/04/2021    RBC 2.79 01/04/2021    HGB 7.8 01/04/2021    HCT 24.3 01/04/2021    MCV 87.1 01/04/2021    RDW 16.1 01/04/2021     01/04/2021       CMP:   Lab Results   Component Value Date     01/04/2021    K 4.5 01/04/2021    K 3.9 01/01/2021    CL 99 01/04/2021    CO2 22 01/04/2021    BUN 14 01/04/2021    CREATININE 0.8 01/04/2021    GFRAA >60 01/04/2021    LABGLOM >60 01/04/2021    GLUCOSE 80 01/04/2021    PROT 5.3 01/04/2021    CALCIUM 7.8 01/04/2021    BILITOT 0.8 01/04/2021    ALKPHOS 100 01/04/2021    AST 35 01/04/2021    ALT 46 01/04/2021       POC Tests: No results for input(s): POCGLU, POCNA, POCK, POCCL, POCBUN, POCHEMO, POCHCT in the last 72 hours.     Coags:   Lab Results   Component Value Date    PROTIME 21.8 01/01/2021    INR 1.9 01/01/2021    APTT 34.1 11/15/2015       HCG (If Applicable): No results found for: PREGTESTUR, PREGSERUM, HCG, HCGQUANT ABGs: No results found for: PHART, PO2ART, DXI8FPV, NDI5QWM, BEART, D3URNHVI     Type & Screen (If Applicable):  No results found for: LABABO, LABRH    Drug/Infectious Status (If Applicable):  No results found for: HIV, HEPCAB    COVID-19 Screening (If Applicable):   Lab Results   Component Value Date    COVID19 Not Detected 01/04/2021         Anesthesia Evaluation  Patient summary reviewed  Airway: Mallampati: III  TM distance: <3 FB   Neck ROM: full  Mouth opening: < 3 FB Dental: normal exam         Pulmonary: breath sounds clear to auscultation                            ROS comment: Former Smoker. Cardiovascular:    (+) hypertension:,       ECG reviewed  Rhythm: regular    Echocardiogram reviewed               ROS comment: EKG: Normal Sinus Rhythm 92, NS St & T changes. ECHO:   No previous echo for comparison. Technically adequate study. Mild concentric left ventricular hypertrophy   Ejection fraction is visually estimated at 55%. No regional wall motion abnormalities seen. E/A flow reversal noted. Suggestive of diastolic dysfunction. Mild calcification of the posterior leaflet of the mitral valve. Physiologic and/or trace mitral regurgitation is present. Physiologic and/or trace tricuspid regurgitation. Neuro/Psych:   (+) CVA:,             GI/Hepatic/Renal: Neg GI/Hepatic/Renal ROS            Endo/Other:    (+) DiabetesType II DM, using insulin, . Abdominal:           Vascular: negative vascular ROS. Anesthesia Plan      MAC     ASA 3       Induction: intravenous. Anesthetic plan and risks discussed with patient. Plan discussed with CRNA.             Laith Mujica,    1/4/2021

## 2021-01-04 NOTE — PROGRESS NOTES
Nephrology Progress Note    1/2/2021: Sitting in bed, alert. States he is feeling better, denies sob, pain. Vital SignsBP (!) 160/77   Pulse 73   Temp 98.5 °F (36.9 °C) (Oral)   Resp 18   Ht 5' 10\" (1.778 m)   Wt 208 lb 1.6 oz (94.4 kg)   SpO2 98%   BMI 29.86 kg/m²   24HR INTAKE/OUTPUT:      Intake/Output Summary (Last 24 hours) at 1/4/2021 1322  Last data filed at 1/4/2021 0620  Gross per 24 hour   Intake 560 ml   Output 2050 ml   Net -1490 ml     Physical Exam    Neck: No JVD  Lungs: Clear upper, diminished lower lobes. Heart: RRR, no murmur. Abdomen: Soft, non distended, non tender, + bowel sounds. Extremeties: No edema.     ROS:  RESPIRATORY: (-) for shortness of breath  CARDIOVASCULAR:  negative  GASTROINTESTINAL:  negative  GENITOURINARY:  negative    Current Facility-Administered Medications   Medication Dose Route Frequency Provider Last Rate Last Admin    LORazepam (ATIVAN) tablet 0.5 mg  0.5 mg Oral TID PRN Mae Hansen MD   0.5 mg at 01/04/21 1243    levoFLOXacin (LEVAQUIN) 500 MG/100ML infusion 500 mg  500 mg Intravenous Q24H Mae Hansen MD   Stopped at 01/03/21 1810    vitamin D (ERGOCALCIFEROL) capsule 50,000 Units  50,000 Units Oral Weekly Jagdeep Laureano MD   50,000 Units at 12/29/20 1605    insulin glargine (LANTUS) injection vial 24 Units  0.25 Units/kg Subcutaneous Nightly Mae Hansen MD   24 Units at 01/03/21 2127    0.9 % sodium chloride bolus  20 mL Intravenous Once Rolin Rasheed, DO        octreotide (SANDOSTATIN) 500 mcg in sodium chloride 0.9 % 100 mL infusion  50 mcg/hr Intravenous Continuous Alfornia Donny, DO 10 mL/hr at 01/04/21 0604 50 mcg/hr at 01/04/21 0604    lidocaine PF 1 % injection 5 mL  5 mL Intradermal Once Mae Hansen MD        sodium chloride flush 0.9 % injection 10 mL  10 mL Intravenous PRN Mae Hansen MD        heparin flush 100 UNIT/ML injection 300 Units  3 mL Intravenous 2 times per day Mae Hansen MD  heparin flush 100 UNIT/ML injection 300 Units  3 mL Intracatheter PRN Abraham Granados MD        ipratropium-albuterol (DUONEB) nebulizer solution 1 ampule  1 ampule Inhalation Q4H Abraham Granados MD   1 ampule at 12/31/20 0931    metronidazole (FLAGYL) 500 mg in NaCl 100 mL IVPB premix  500 mg Intravenous Q8H Abraham Granados  mL/hr at 01/04/21 1151 500 mg at 01/04/21 1151    glucose (GLUTOSE) 40 % oral gel 15 g  15 g Oral PRN Abraham Granados MD        dextrose 50 % IV solution  12.5 g Intravenous PRN Abraham Granados MD   12.5 g at 01/02/21 0848    glucagon (rDNA) injection 1 mg  1 mg Intramuscular PRN Abraham Granados MD        dextrose 5 % solution  100 mL/hr Intravenous PRN Abraham Granados MD        pantoprazole (PROTONIX) injection 40 mg  40 mg Intravenous BID Jose Luis Jacob DO   40 mg at 01/04/21 0933    insulin lispro (HUMALOG) injection vial 0-12 Units  0-12 Units Subcutaneous Q6H Abraham Granados MD   2 Units at 01/03/21 1705    [Held by provider] aspirin EC tablet 81 mg  81 mg Oral Daily Martinez Walters MD   81 mg at 12/26/20 0902    atorvastatin (LIPITOR) tablet 40 mg  40 mg Oral Nightly Martinez Walters MD   40 mg at 01/03/21 2125    [Held by provider] clopidogrel (PLAVIX) tablet 75 mg  75 mg Oral Daily Martinez Walters MD   75 mg at 12/26/20 0902    thiamine mononitrate tablet 100 mg  100 mg Oral Daily Martinez Walters MD   100 mg at 01/04/21 0933    sodium chloride flush 0.9 % injection 10 mL  10 mL Intravenous 2 times per day Cassy Crump MD   10 mL at 01/03/21 2127    enoxaparin (LOVENOX) injection 40 mg  40 mg Subcutaneous Daily Martinez Walters MD   Stopped at 01/03/21 0845    promethazine (PHENERGAN) tablet 12.5 mg  12.5 mg Oral Q6H PRN Martinez Walters MD        Or    ondansetron (ZOFRAN) injection 4 mg  4 mg Intravenous Q6H PRN Martinez Walters MD   4 mg at 01/01/21 0339    polyethylene glycol (GLYCOLAX) packet 17 g  17 g Oral Daily PRN Cassy Crump MD  acetaminophen (TYLENOL) tablet 650 mg  650 mg Oral Q6H PRN Martinez Walters MD   650 mg at 01/01/21 1347    Or    acetaminophen (TYLENOL) suppository 650 mg  650 mg Rectal Q6H PRN Martinez Walters MD   650 mg at 12/26/20 2325       CT CHEST WO CONTRAST   Final Result   Scattered, patchy ground-glass and consolidative airspace opacities   throughout both lungs, most pronounced in the lower lobes bilaterally. Small   bilateral pleural effusions are present as well. Findings are consistent   with multifocal pneumonia. Viral/atypical pneumonia not excluded. Hepatic steatosis. Small volume of free fluid visualized along the anterior pararenal space   bilaterally. This is of uncertain etiology or clinical significance. Consider correlation with serum lipase levels. CT of the abdomen and pelvis   could be considered as well, if clinically appropriate. XR CHEST PORTABLE   Final Result   In consideration to differences in lung volumes, no significant interval   change compared to 1 day prior. XR CHEST PORTABLE   Final Result   Continued bilateral pulmonary airspace infiltrates, with mild improved   aeration of the right lung base. US ABDOMEN LIMITED   Final Result   Mildly enlarged fatty liver. XR CHEST PORTABLE   Final Result   1. No interval change in the significant multifocal bilateral perihilar and   lower lobe infiltrates. XR CHEST PORTABLE   Final Result   Bilateral pulmonary infiltrates no significant interval change      XR CHEST PORTABLE   Final Result   Left basilar pulmonary opacity which may represent atelectasis, pneumonia,   effusion or a combination thereof. XR SHOULDER LEFT (MIN 2 VIEWS)   Final Result   No acute fracture or dislocation in the left shoulder. XR CHEST (2 VW)   Final Result   No acute cardiopulmonary process.             Labs:    CBC:   Recent Labs     01/03/21  0440 01/03/21  0845 01/03/21  2241 01/04/21  0432   WBC 12.0*  --   --  12.7* HGB 7.4* 7.6* 7.9* 7.8*     --   --  240        Lab Results   Component Value Date    IRON 17 (L) 12/28/2020    TIBC 232 (L) 12/28/2020    FERRITIN 125 12/28/2020       Lab Results   Component Value Date    PTH 60 12/28/2020    CALCIUM 7.8 (L) 01/04/2021    CALCIUM 7.8 (L) 01/03/2021    CALCIUM 7.5 (L) 01/02/2021    CAION 1.16 01/04/2021    CAION 1.18 01/03/2021    CAION 1.16 12/30/2020    PHOS 3.0 01/04/2021    PHOS 3.2 01/03/2021    PHOS 2.6 12/28/2020    MG 1.8 01/04/2021    MG 1.8 01/03/2021    MG 2.0 12/30/2020       BMP:   Recent Labs     01/02/21  0829 01/03/21  0440 01/04/21  0432   * 129* 131*   K 4.3 4.4 4.5    98 99   CO2 23 20* 22   BUN 16 16 14   CREATININE 0.9 0.8 0.8   GLUCOSE 72* 67* 80     Assessment: / Plan:    1. Severe hyponatremia. Hyponatremia believed to be secondary to multiple factors including thiazide diuretic use, significant history of alcohol abuse in the form of beer limiting free water excretion due to low solute load as well as possibility of underlying SIADH. S/P 1 dose dDAVP on 12/28 at 2300  Na+ increasing slowly is at 131 today  PLAN:1.Follow Na+    2. Acute kidney injury. FeNa <1% suggesting possible component of decreased effective renal perfusion in the setting of the hypotension  Cr at 0.8 mg/dl  UOP 2050 mL in last 24 hours  PLAN:1. Continue to monitor    3. Metabolic acidosis with increased anion gap. Patient with a lactic acidosis possible Type A secondary to tissue hypoperfusion as well as a possible Type B due to  use of Metformin. Lactic Acid down from 6.3-->2.5-->2.2->2.0=>Resolved  PLAN:1. Continue off Metformin     4. Hypotension. Resolved  BP improving is above target of <130/80 today  PLAN:1.Monitor and add antihypertensive if needed    5. Microcytic Anemia.     Hgb down to 6.2-->7.3->7.6-->8.0->7.6 S/P 1 unit PRBC 12/28/20  Ferritin 125 with an iron sat 7%  PLAN:1. Would treat with IV Fe++ once infection cleared and pt off antibx

## 2021-01-04 NOTE — PROGRESS NOTES
Department of Internal Medicine  General Internal Medicine  Attending Progress Note      SUBJECTIVE:    No complaints  denies fever chills nausea vomiting chest pain. RN at bedside.  No issues    OBJECTIVE      Medications    Current Facility-Administered Medications: sodium chloride tablet 1 g, 1 g, Oral, TID WC  LORazepam (ATIVAN) tablet 0.5 mg, 0.5 mg, Oral, TID PRN  levoFLOXacin (LEVAQUIN) 500 MG/100ML infusion 500 mg, 500 mg, Intravenous, Q24H  vitamin D (ERGOCALCIFEROL) capsule 50,000 Units, 50,000 Units, Oral, Weekly  insulin glargine (LANTUS) injection vial 24 Units, 0.25 Units/kg, Subcutaneous, Nightly  0.9 % sodium chloride bolus, 20 mL, Intravenous, Once  octreotide (SANDOSTATIN) 500 mcg in sodium chloride 0.9 % 100 mL infusion, 50 mcg/hr, Intravenous, Continuous  lidocaine PF 1 % injection 5 mL, 5 mL, Intradermal, Once  sodium chloride flush 0.9 % injection 10 mL, 10 mL, Intravenous, PRN  heparin flush 100 UNIT/ML injection 300 Units, 3 mL, Intravenous, 2 times per day  heparin flush 100 UNIT/ML injection 300 Units, 3 mL, Intracatheter, PRN  ipratropium-albuterol (DUONEB) nebulizer solution 1 ampule, 1 ampule, Inhalation, Q4H  metronidazole (FLAGYL) 500 mg in NaCl 100 mL IVPB premix, 500 mg, Intravenous, Q8H  glucose (GLUTOSE) 40 % oral gel 15 g, 15 g, Oral, PRN  dextrose 50 % IV solution, 12.5 g, Intravenous, PRN  glucagon (rDNA) injection 1 mg, 1 mg, Intramuscular, PRN  dextrose 5 % solution, 100 mL/hr, Intravenous, PRN  pantoprazole (PROTONIX) injection 40 mg, 40 mg, Intravenous, BID  insulin lispro (HUMALOG) injection vial 0-12 Units, 0-12 Units, Subcutaneous, Q6H  [Held by provider] aspirin EC tablet 81 mg, 81 mg, Oral, Daily  atorvastatin (LIPITOR) tablet 40 mg, 40 mg, Oral, Nightly  [Held by provider] clopidogrel (PLAVIX) tablet 75 mg, 75 mg, Oral, Daily  thiamine mononitrate tablet 100 mg, 100 mg, Oral, Daily  sodium chloride flush 0.9 % injection 10 mL, 10 mL, Intravenous, 2 times per day enoxaparin (LOVENOX) injection 40 mg, 40 mg, Subcutaneous, Daily  promethazine (PHENERGAN) tablet 12.5 mg, 12.5 mg, Oral, Q6H PRN **OR** ondansetron (ZOFRAN) injection 4 mg, 4 mg, Intravenous, Q6H PRN  polyethylene glycol (GLYCOLAX) packet 17 g, 17 g, Oral, Daily PRN  acetaminophen (TYLENOL) tablet 650 mg, 650 mg, Oral, Q6H PRN **OR** acetaminophen (TYLENOL) suppository 650 mg, 650 mg, Rectal, Q6H PRN  Physical    VITALS:  BP (!) 149/68   Pulse 69   Temp 98.1 °F (36.7 °C) (Oral)   Resp 18   Ht 5' 10\" (1.778 m)   Wt 208 lb 1.6 oz (94.4 kg)   SpO2 98%   BMI 29.86 kg/m²   CONSTITUTIONAL:  awake, alert, cooperative, no apparent distress, and appears stated age  EYES:  Lids and lashes normal, pupils equal, round and reactive to light, extra ocular muscles intact, sclera clear, conjunctiva normal  ENT:  normocepalic, without obvious abnormality  NECK:  supple, symmetrical, trachea midline  HEMATOLOGIC/LYMPHATICS:  no cervical lymphadenopathy  BACK:  symmetric  LUNGS:  No increased work of breathing, good air exchange, clear to auscultation bilaterally, no crackles or wheezing  CARDIOVASCULAR:  normal apical pulses  ABDOMEN:  No scars, normal bowel sounds, soft, non-distended, non-tender, no masses palpated, no hepatosplenomegally  MUSCULOSKELETAL:  there is no redness, warmth, or swelling of the joints  NEUROLOGIC:  Awake, alert, oriented to name, place and time.   Left arm and leg weak  SKIN:  no bruising or bleeding    Data    CBC:   Lab Results   Component Value Date    WBC 12.7 01/04/2021    RBC 2.79 01/04/2021    HGB 7.8 01/04/2021    HCT 24.3 01/04/2021    MCV 87.1 01/04/2021    MCH 28.0 01/04/2021    MCHC 32.1 01/04/2021    RDW 16.1 01/04/2021     01/04/2021    MPV 9.7 01/04/2021     BMP:    Lab Results   Component Value Date     01/04/2021    K 4.5 01/04/2021    K 3.9 01/01/2021    CL 99 01/04/2021    CO2 22 01/04/2021    BUN 14 01/04/2021    LABALBU 2.4 01/04/2021    CREATININE 0.8 01/04/2021 CALCIUM 7.8 01/04/2021    GFRAA >60 01/04/2021    LABGLOM >60 01/04/2021    GLUCOSE 80 01/04/2021       ASSESSMENT AND PLAN    He presented with a fall from home but has a h/o  CVA with left-sided weakness, diabetes mellitus and hyperlipidemia        1. Hyponatremia:slow improvement. Etiology multifactorial per renal:   thiazide diuretic use, significant history of alcohol abuse in the form of beer limiting free water excretion due to low solute load as well as possibility of underlying SIADH. S/P 1 dose dDAVP on 12/28 at 2300. Continue sodium tablets. Follow BMP    2. History of CVA (cerebral vascular accident) [facility]): continue regimen  Left hemiparesis. Quit smoking and drinking with cva onset 6 years ago per patient      3. Diabetes mellitus ([address]): Well-controlled  Continue current insulin regimen    4. Alcohol abuse: in past. not active. Did not withdraw    5. Anemia: Etiology of this is unknown. EGD by GI on 1/4    6. Dispo: PT OT at discharge.  Plan to discharge to rehab

## 2021-01-04 NOTE — INTERVAL H&P NOTE
Patient's H&P was reviewed from admission date    BP (!) 160/77   Pulse 73   Temp 98.5 °F (36.9 °C) (Oral)   Resp 18   Ht 5' 10\" (1.778 m)   Wt 208 lb 1.6 oz (94.4 kg)   SpO2 98%   BMI 29.86 kg/m²        Patient examined.     Plan:   EGD    Al Soto DO  1/4/2021  1:55 PM

## 2021-01-04 NOTE — ANESTHESIA POSTPROCEDURE EVALUATION
Department of Anesthesiology  Postprocedure Note    Patient: Milady Brown  MRN: [id number]  YOB: 1960  Date of evaluation: 1/4/2021  Time:  4:10 PM     Procedure Summary     Date: 01/04/21 Room / Location: [facility] 01 / [address] [facility]    Anesthesia Start: 7838 Anesthesia Stop: 1500    Procedure: EGD BIOPSY (N/A ) Diagnosis: (ABDOMINAL PAIN)    Surgeons: Sheeba Alarcon DO Responsible Provider: Nirmal Joy DO    Anesthesia Type: MAC ASA Status: 3          Anesthesia Type: No value filed. Stephen Phase I:      Stephen Phase II: Stephen Score: 9    Last vitals: Reviewed and per EMR flowsheets.        Anesthesia Post Evaluation    Patient location during evaluation: PACU  Patient participation: complete - patient participated  Level of consciousness: awake and alert  Airway patency: patent  Nausea & Vomiting: no nausea and no vomiting  Complications: no  Cardiovascular status: hemodynamically stable  Respiratory status: acceptable  Hydration status: euvolemic

## 2021-01-04 NOTE — PROCEDURES
Procedure:    Esophagogastroduodenoscopy    Indication:    Acute on chronic microcytic anemia/possible coffee-ground emesis    Estimated Blood Loss:   Minimal, < 2 cc    Consent:   Informed consent was obtained from the patient including and not limited to risk of perforation, aspiration of gastric contents or teeth, bleeding, infection, dental breakage, ileus, need for surgery, or worst case death. Sedation  Endoscope was advanced easily through mouth to second portion of duodenum    Oropharynx:    views are limited but grossly normal.    Esophagus:   Mucosa is normal.  GEJ at ~40 cm. Small sliding hiatal hernia     Stomach:   Mild antral gastritis, biopsies of antrum obtained to r/o H. Pylori    Gastric body is normal.    Retroflexed views showed normal fundus and cardia. Duodenum: Bulb is normal.     Second portion of duodenum is normal.  No fresh of old blood. Biopsies taken to r/o celiac    IMPRESSION AND PLAN:     1. Mild antral gastritis     2. Small sliding hiatal hernia     No obvious source of patients microcytic anemia evident on EGD. No varices, Sony ulcers, or erosions. Okay to D/C octreotide and change PPI oral and once daily. Okay for diet from GI POV. Okay to resume Plavix and ASA. No plans for inpatient colonoscopy at this time. Patient to follow up as outpatient        Follow up as outpatient in office, call [phone] to schedule for appointment. Al Soto DO  1/4/2021  3:00 PM      I was present for entire duration of procedure and participated in key and non-key portions. Discussed findings with the fellow and agree with recommendations above.     Ewa Willams DO  1/4/2021  3:06 PM

## 2021-01-05 VITALS
OXYGEN SATURATION: 97 % | BODY MASS INDEX: 29.79 KG/M2 | TEMPERATURE: 98.1 F | WEIGHT: 208.1 LBS | RESPIRATION RATE: 16 BRPM | SYSTOLIC BLOOD PRESSURE: 129 MMHG | HEART RATE: 74 BPM | HEIGHT: 70 IN | DIASTOLIC BLOOD PRESSURE: 69 MMHG

## 2021-01-05 LAB
ANION GAP SERPL CALCULATED.3IONS-SCNC: 10 MMOL/L (ref 7–16)
BUN BLDV-MCNC: 14 MG/DL (ref 8–23)
CALCIUM IONIZED: 1.18 MMOL/L (ref 1.15–1.33)
CALCIUM SERPL-MCNC: 7.9 MG/DL (ref 8.6–10.2)
CHLORIDE BLD-SCNC: 97 MMOL/L (ref 98–107)
CO2: 22 MMOL/L (ref 22–29)
CREAT SERPL-MCNC: 0.8 MG/DL (ref 0.7–1.2)
GFR AFRICAN AMERICAN: >60
GFR NON-AFRICAN AMERICAN: >60 ML/MIN/1.73
GLUCOSE BLD-MCNC: 66 MG/DL (ref 74–99)
HCT VFR BLD CALC: 25.2 % (ref 37–54)
HCT VFR BLD CALC: 25.3 % (ref 37–54)
HEMOGLOBIN: 7.9 G/DL (ref 12.5–16.5)
HEMOGLOBIN: 8.3 G/DL (ref 12.5–16.5)
MAGNESIUM: 1.7 MG/DL (ref 1.6–2.6)
MCH RBC QN AUTO: 27.9 PG (ref 26–35)
MCHC RBC AUTO-ENTMCNC: 31.2 % (ref 32–34.5)
MCV RBC AUTO: 89.4 FL (ref 80–99.9)
METER GLUCOSE: 166 MG/DL (ref 74–99)
METER GLUCOSE: 77 MG/DL (ref 74–99)
PDW BLD-RTO: 15.9 FL (ref 11.5–15)
PHOSPHORUS: 3.4 MG/DL (ref 2.5–4.5)
PLATELET # BLD: 270 E9/L (ref 130–450)
PMV BLD AUTO: 9.5 FL (ref 7–12)
POTASSIUM SERPL-SCNC: 4.4 MMOL/L (ref 3.5–5)
RBC # BLD: 2.83 E12/L (ref 3.8–5.8)
SODIUM BLD-SCNC: 129 MMOL/L (ref 132–146)
WBC # BLD: 13.6 E9/L (ref 4.5–11.5)

## 2021-01-05 PROCEDURE — 80048 BASIC METABOLIC PNL TOTAL CA: CPT

## 2021-01-05 PROCEDURE — 6370000000 HC RX 637 (ALT 250 FOR IP): Performed by: SURGERY

## 2021-01-05 PROCEDURE — 85018 HEMOGLOBIN: CPT

## 2021-01-05 PROCEDURE — 94660 CPAP INITIATION&MGMT: CPT

## 2021-01-05 PROCEDURE — 6360000002 HC RX W HCPCS: Performed by: SURGERY

## 2021-01-05 PROCEDURE — 84100 ASSAY OF PHOSPHORUS: CPT

## 2021-01-05 PROCEDURE — 85014 HEMATOCRIT: CPT

## 2021-01-05 PROCEDURE — 2700000000 HC OXYGEN THERAPY PER DAY

## 2021-01-05 PROCEDURE — 82330 ASSAY OF CALCIUM: CPT

## 2021-01-05 PROCEDURE — 2500000003 HC RX 250 WO HCPCS: Performed by: SURGERY

## 2021-01-05 PROCEDURE — 99239 HOSP IP/OBS DSCHRG MGMT >30: CPT | Performed by: INTERNAL MEDICINE

## 2021-01-05 PROCEDURE — 85027 COMPLETE CBC AUTOMATED: CPT

## 2021-01-05 PROCEDURE — 2580000003 HC RX 258: Performed by: SURGERY

## 2021-01-05 PROCEDURE — 36415 COLL VENOUS BLD VENIPUNCTURE: CPT

## 2021-01-05 PROCEDURE — 97530 THERAPEUTIC ACTIVITIES: CPT

## 2021-01-05 PROCEDURE — 83735 ASSAY OF MAGNESIUM: CPT

## 2021-01-05 PROCEDURE — 82962 GLUCOSE BLOOD TEST: CPT

## 2021-01-05 RX ORDER — IPRATROPIUM BROMIDE AND ALBUTEROL SULFATE 2.5; .5 MG/3ML; MG/3ML
3 SOLUTION RESPIRATORY (INHALATION) EVERY 4 HOURS
Qty: 360 ML | Refills: 0
Start: 2021-01-05 | End: 2022-01-06

## 2021-01-05 RX ORDER — LIDOCAINE HYDROCHLORIDE 10 MG/ML
5 INJECTION, SOLUTION EPIDURAL; INFILTRATION; INTRACAUDAL; PERINEURAL ONCE
Qty: 5 ML | Refills: 0 | Status: SHIPPED | OUTPATIENT
Start: 2021-01-05 | End: 2021-01-05 | Stop reason: HOSPADM

## 2021-01-05 RX ORDER — PANTOPRAZOLE SODIUM 40 MG/1
40 TABLET, DELAYED RELEASE ORAL
Qty: 30 TABLET | Refills: 3 | Status: SHIPPED | OUTPATIENT
Start: 2021-01-06 | End: 2022-02-14 | Stop reason: ALTCHOICE

## 2021-01-05 RX ORDER — POLYETHYLENE GLYCOL 3350 17 G/17G
17 POWDER, FOR SOLUTION ORAL DAILY PRN
Qty: 527 G | Refills: 1 | Status: SHIPPED | OUTPATIENT
Start: 2021-01-05 | End: 2021-02-04

## 2021-01-05 RX ORDER — ERGOCALCIFEROL 1.25 MG/1
50000 CAPSULE ORAL WEEKLY
Qty: 5 CAPSULE | Refills: 0 | COMMUNITY
Start: 2021-01-12

## 2021-01-05 RX ADMIN — PANTOPRAZOLE SODIUM 40 MG: 40 TABLET, DELAYED RELEASE ORAL at 05:57

## 2021-01-05 RX ADMIN — LORAZEPAM 0.5 MG: 0.5 TABLET ORAL at 06:02

## 2021-01-05 RX ADMIN — METRONIDAZOLE 500 MG: 500 INJECTION, SOLUTION INTRAVENOUS at 02:57

## 2021-01-05 RX ADMIN — Medication 10 ML: at 09:49

## 2021-01-05 RX ADMIN — LORAZEPAM 0.5 MG: 0.5 TABLET ORAL at 13:30

## 2021-01-05 RX ADMIN — METRONIDAZOLE 500 MG: 500 INJECTION, SOLUTION INTRAVENOUS at 13:31

## 2021-01-05 RX ADMIN — ERGOCALCIFEROL 50000 UNITS: 1.25 CAPSULE ORAL at 09:49

## 2021-01-05 RX ADMIN — ENOXAPARIN SODIUM 40 MG: 40 INJECTION SUBCUTANEOUS at 09:48

## 2021-01-05 RX ADMIN — THIAMINE HCL TAB 100 MG 100 MG: 100 TAB at 09:49

## 2021-01-05 ASSESSMENT — PAIN SCALES - GENERAL: PAINLEVEL_OUTOF10: 0

## 2021-01-05 NOTE — PLAN OF CARE
Problem: Discharge Planning:  Goal: Participates in care planning  Description: Participates in care planning  Outcome: Met This Shift     Problem: Airway Clearance - Ineffective:  Goal: Ability to maintain a clear airway will improve  Description: Ability to maintain a clear airway will improve  Outcome: Met This Shift     Problem: Anxiety/Stress:  Goal: Level of anxiety will decrease  Description: Level of anxiety will decrease  Outcome: Met This Shift     Problem: Aspiration:  Goal: Absence of aspiration  Description: Absence of aspiration  Outcome: Met This Shift     Problem:  Bowel Function - Altered:  Goal: Bowel elimination is within specified parameters  Description: Bowel elimination is within specified parameters  Outcome: Met This Shift     Problem: Cardiac Output - Decreased:  Goal: Hemodynamic stability will improve  Description: Hemodynamic stability will improve  Outcome: Met This Shift     Problem: Fluid Volume - Imbalance:  Goal: Absence of imbalanced fluid volume signs and symptoms  Description: Absence of imbalanced fluid volume signs and symptoms  Outcome: Met This Shift     Problem: Gas Exchange - Impaired:  Goal: Levels of oxygenation will improve  Description: Levels of oxygenation will improve  Outcome: Met This Shift     Problem: Mental Status - Impaired:  Goal: Mental status will be restored to baseline  Description: Mental status will be restored to baseline  Outcome: Met This Shift     Problem: Nutrition Deficit:  Goal: Ability to achieve adequate nutritional intake will improve  Description: Ability to achieve adequate nutritional intake will improve  Outcome: Met This Shift     Problem: Pain:  Goal: Pain level will decrease  Description: Pain level will decrease  Outcome: Met This Shift     Problem: Serum Glucose Level - Abnormal:  Goal: Ability to maintain appropriate glucose levels will improve to within specified parameters Description: Ability to maintain appropriate glucose levels will improve to within specified parameters  Outcome: Met This Shift     Problem: Skin Integrity - Impaired:  Goal: Will show no infection signs and symptoms  Description: Will show no infection signs and symptoms  Outcome: Met This Shift     Problem: Tissue Perfusion, Altered:  Goal: Circulatory function within specified parameters  Description: Circulatory function within specified parameters  Outcome: Met This Shift     Problem: Tissue Perfusion - Cardiopulmonary, Altered:  Goal: Absence of angina  Description: Absence of angina  Outcome: Met This Shift     Problem: Falls - Risk of:  Goal: Will remain free from falls  Description: Will remain free from falls  Outcome: Met This Shift     Problem: Skin Integrity:  Goal: Will show no infection signs and symptoms  Description: Will show no infection signs and symptoms  Outcome: Met This Shift     Problem: Restraint Use - Nonviolent/Non-Self-Destructive Behavior:  Goal: Absence of restraint indications  Description: Absence of restraint indications  Outcome: Met This Shift     Problem: Pain:  Goal: Pain level will decrease  Description: Pain level will decrease  Outcome: Met This Shift

## 2021-01-05 NOTE — PROGRESS NOTES
Anemia panel suggestive of anemia of chronic disease  Daily PPI  Okay to resume Plavix and aspirin, will defer to primary  Okay for general diet and okay to DC home from GI POV  Patient can follow-up as outpatient to discuss outpatient colonoscopy        2. Elevated Liver transaminases-trending down  Resolved  -Suspect secondary to patient's hypotension ischemic hepatitis  - R Factor 17.1 indicating hepatocellular injury  - acute hepatitis panel, MERLIN, Igg all negative   - RUQ US with mildly enlarged fatty liver    3. Severe Hyponatremia  Resolved  - Per nephrology/admitting     We will discuss with attending    Jenny Nam DO  1/5/2021  11:44 AM    Pt seen and independently examined. Pertinent notes and lab work reviewed. Monitor reviewed showing sinus rhythm. D/w Dr. Agnieszka Hendrickson with physical exam and A&P. Discussed with patient - all questions answered - agreeable with the plan as delineated.     Evens Seo MD  1/5/2021  2:45 PM

## 2021-01-05 NOTE — PROGRESS NOTES
Nurse to nurse called to Brandon Arango at [employer].   Electronically signed by Oli Michelle RN on 1/5/2021 at 2:56 PM

## 2021-01-05 NOTE — PROGRESS NOTES
 heparin flush 100 UNIT/ML injection 300 Units  3 mL Intravenous 2 times per day Thadeus Dapash, DO        heparin flush 100 UNIT/ML injection 300 Units  3 mL Intracatheter PRN Thadeus Dapash, DO        ipratropium-albuterol (DUONEB) nebulizer solution 1 ampule  1 ampule Inhalation Q4H Thadeus Dapash, DO   1 ampule at 12/31/20 0931    metronidazole (FLAGYL) 500 mg in NaCl 100 mL IVPB premix  500 mg Intravenous Q8H Thadeus Dapash, DO   Stopped at 01/05/21 0358    glucose (GLUTOSE) 40 % oral gel 15 g  15 g Oral PRN Thadeus Dapash, DO        dextrose 50 % IV solution  12.5 g Intravenous PRN Thadeus Dapash, DO   12.5 g at 01/02/21 0848    glucagon (rDNA) injection 1 mg  1 mg Intramuscular PRN Thadeus Dapash, DO        dextrose 5 % solution  100 mL/hr Intravenous PRN Thadeus Dapash, DO        insulin lispro (HUMALOG) injection vial 0-12 Units  0-12 Units Subcutaneous Q6H Thadeus Dapash, DO   2 Units at 01/03/21 1705    [Held by provider] aspirin EC tablet 81 mg  81 mg Oral Daily Martinez Walters MD   81 mg at 12/26/20 0902    atorvastatin (LIPITOR) tablet 40 mg  40 mg Oral Nightly Thadeus Dapash, DO   40 mg at 01/04/21 1953    [Held by provider] clopidogrel (PLAVIX) tablet 75 mg  75 mg Oral Daily Martinez Walters MD   75 mg at 12/26/20 0902    thiamine mononitrate tablet 100 mg  100 mg Oral Daily Thadeus Dapash, DO   100 mg at 01/05/21 0949    sodium chloride flush 0.9 % injection 10 mL  10 mL Intravenous 2 times per day Thadeus Dapash, DO   10 mL at 01/05/21 0949    enoxaparin (LOVENOX) injection 40 mg  40 mg Subcutaneous Daily Thadeus Dapash, DO   40 mg at 01/05/21 0948    promethazine (PHENERGAN) tablet 12.5 mg  12.5 mg Oral Q6H PRN Thadeus Dapash, DO        Or    ondansetron (ZOFRAN) injection 4 mg  4 mg Intravenous Q6H PRN Thadeus Dapash, DO   4 mg at 01/01/21 0339    polyethylene glycol (GLYCOLAX) packet 17 g  17 g Oral Daily PRN Thajuan Soto DO  acetaminophen (TYLENOL) tablet 650 mg  650 mg Oral Q6H PRN Thadeus Dapash, DO   650 mg at 01/01/21 1347    Or    acetaminophen (TYLENOL) suppository 650 mg  650 mg Rectal Q6H PRN Thadeus Dapash, DO   650 mg at 12/26/20 2325       CT CHEST WO CONTRAST   Final Result   Scattered, patchy ground-glass and consolidative airspace opacities   throughout both lungs, most pronounced in the lower lobes bilaterally. Small   bilateral pleural effusions are present as well. Findings are consistent   with multifocal pneumonia. Viral/atypical pneumonia not excluded. Hepatic steatosis. Small volume of free fluid visualized along the anterior pararenal space   bilaterally. This is of uncertain etiology or clinical significance. Consider correlation with serum lipase levels. CT of the abdomen and pelvis   could be considered as well, if clinically appropriate. XR CHEST PORTABLE   Final Result   In consideration to differences in lung volumes, no significant interval   change compared to 1 day prior. XR CHEST PORTABLE   Final Result   Continued bilateral pulmonary airspace infiltrates, with mild improved   aeration of the right lung base. US ABDOMEN LIMITED   Final Result   Mildly enlarged fatty liver. XR CHEST PORTABLE   Final Result   1. No interval change in the significant multifocal bilateral perihilar and   lower lobe infiltrates. XR CHEST PORTABLE   Final Result   Bilateral pulmonary infiltrates no significant interval change      XR CHEST PORTABLE   Final Result   Left basilar pulmonary opacity which may represent atelectasis, pneumonia,   effusion or a combination thereof. XR SHOULDER LEFT (MIN 2 VIEWS)   Final Result   No acute fracture or dislocation in the left shoulder. XR CHEST (2 VW)   Final Result   No acute cardiopulmonary process.             Labs:    CBC:   Recent Labs     01/03/21  0440 01/03/21  0440 01/04/21  0432 01/04/21  2053 01/05/21  7205 01/05/21  1003 WBC 12.0*  --  12.7*  --  13.6*  --    HGB 7.4*   < > 7.8* 8.1* 7.9* 8.3*     --  240  --  270  --     < > = values in this interval not displayed. Lab Results   Component Value Date    IRON 17 (L) 12/28/2020    TIBC 232 (L) 12/28/2020    FERRITIN 125 12/28/2020       Lab Results   Component Value Date    PTH 60 12/28/2020    CALCIUM 7.9 (L) 01/05/2021    CALCIUM 7.8 (L) 01/04/2021    CALCIUM 7.8 (L) 01/03/2021    CAION 1.18 01/05/2021    CAION 1.16 01/04/2021    CAION 1.18 01/03/2021    PHOS 3.4 01/05/2021    PHOS 3.0 01/04/2021    PHOS 3.2 01/03/2021    MG 1.7 01/05/2021    MG 1.8 01/04/2021    MG 1.8 01/03/2021       BMP:   Recent Labs     01/03/21  0440 01/04/21  0432 01/05/21  0652   * 131* 129*   K 4.4 4.5 4.4   CL 98 99 97*   CO2 20* 22 22   BUN 16 14 14   CREATININE 0.8 0.8 0.8   GLUCOSE 67* 80 66*             Assessment: / Plan:       1. Severe hyponatremia. Hyponatremia believed to be secondary to multiple factors including thiazide diuretic use, significant history of alcohol abuse in the form of beer limiting free water excretion due to low solute load as well as possibility of underlying SIADH. Vira Libman Serum sodium is 129 mmol/L today. Will need close monitoring.     2. Acute kidney injury. FeNa <1% suggesting possible component of decreased effective renal perfusion in the setting of the hypotension. Serum creatinine is stable at 0.8 mg pressor. Patient with urine output of over 2 L every day.       3. Metabolic acidosis with increased anion gap. Patient with a lactic acidosis possible Type A secondary to tissue hypoperfusion as well as a possible Type B due to  use of Metformin. Resolved. 4.   Hypotension. Resolved. Blood pressure at target of less than 130/80 mm Hg  Add antihypertensive if needed later on.        5. Microcytic Anemia.   Hgb down to 6.2-->7.3->7.6-->8.0->7.6 S/P 1 unit PRBC 12/28/20  Ferritin 125 with an iron sat 7% Would treat with IV Fe++ once infection cleared and pt off antibx     6. Hypocalcemia with vitamin D deficiency. Hypocalcemia also in association with   hypoalbumionemia. Ionized calcium was normal.  Continue vitamin D supplement. Berkley Wu MD  Nephrology  Electronically signed by Berkley Wu MD on 1/5/2021 at 11:53 AM      Note: This report was completed utilizing computer voice recognition software. Every effort has been made to ensure accuracy, however; inadvertent computerized transcription errors may be present.

## 2021-01-05 NOTE — PROGRESS NOTES
Physical Therapy    Physical Therapy Treatment Note    Room #:  6153/5955-25  Patient Name: Pj Wilson  YOB: 1960  MRN: [id number]    Referring Provider:   Khurram Sims DO      Date of Service: 1/5/2021    Evaluating Physical Therapist: Anabela Madrigal, PT  #[id number]       Diagnosis:   Hyponatremia [E87.1]   Admitted with   Falls and shortness of breath , bruising noted on right distal knee ; nursing made aware    Patient Active Problem List   Diagnosis    [facility]    Diabetes mellitus ([facility]    Hyponatremia        Tentative placement recommendation: Subacute rehab    Equipment recommendation: Patient has needed equipment       Prior Level of Function: Patient ambulated with cane    Rehab Potential: good  for baseline    Past medical history:   Past Medical History:   Diagnosis Date    Cerebral artery occlusion with cerebral infarction ([facility]     Diabetes mellitus ([facility]     Type 2    Hypertension      Past Surgical History:   Procedure Laterality Date    FINGER AMPUTATION      KNEE ARTHROSCOPY      TONSILLECTOMY      UPPER GASTROINTESTINAL ENDOSCOPY N/A 1/4/2021    EGD BIOPSY performed by Damir Valero DO at [facility] ENDOSCOPY       Precautions: Up as tolerated, falls, alarm and O2 ,  2 Liters of o2 via nasal cannula     SUBJECTIVE:    Social history: Patient lives with family brother who has ms and mother in a two story home 3  with 3 steps  to enter with Rail  Tub shower grab bars    Equipment owned: Curt Vidal    [facility]   How much difficulty turning over in bed?: A Lot  How much difficulty sitting down on / standing up from a chair with arms?: A Lot  How much difficulty moving from lying on back to sitting on side of bed?: A Lot  How much help from another person moving to and from a bed to a chair?: A Lot  How much help from another person needed to walk in hospital room?: Total How much help from another person for climbing 3-5 steps with a railing?: Total  AM-PAC Inpatient Mobility Raw Score : 10  AM-PAC Inpatient T-Scale Score : 32.29  Mobility Inpatient CMS 0-100% Score: 76.75  Mobility Inpatient CMS G-Code Modifier : CL    Nursing cleared patient for PT treatment. OBJECTIVE;   Initial Evaluation  Date: 12/29/20 Treatment Date:  1/5/2021       Short Term/ Long Term   Goals   Was pt agreeable to Eval/treatment? Yes   yes To be met in 3 days   Pain level   8/10  left groin  0/10    Bed Mobility    Rolling: Moderate assist of 1    Supine to sit: Moderate assist of 1    Sit to supine: Moderate assist of 1    Scooting:  Moderate assist of 1   Rolling: Maximal assist of 1   Supine to sit: Maximal assist of 1   Sit to supine: Maximal assist of  2   Scooting: Maximal assist of 1    Rolling: Supervision     Supine to sit: Supervision     Sit to supine: Supervision     Scooting: Supervision      Transfers Sit to stand: Maximal assist of 1   Sit to stand: Maximal assist of 1 2x, sat down impulsively     Sit to stand: Supervision      Ambulation    not assessed    not assessed     50 feet using  cane with Supervision     Stair negotiation: ascended and descended   Not assessed       3 steps with rail supervision    ROM Within functional limits with exception of right shoulder     Increase range of motion 10% of affected joints    Strength LUE:  1/5  R UE:  3/5  RLE:  3/5  LLE:  2/5   Increase strength in affected mm groups by 1/3 grade   Balance Sitting EOB:  fair    Dynamic Standing:  poor   Sitting EOB: poor left lateral/posterior lean  Dynamic Standing: poor, unsteady, using LBQC    Sitting EOB:  good    Dynamic Standing: fair       Patient is Alert & Oriented x person, place and situation and follows one step directions    Sensation:  Patient  reports numbness and tingling left lower extremity    Edema:  yes left upper extremity    Endurance: poor Vitals: 2 Liters of o2 via nasal cannula   Blood Pressure at rest  Blood Pressure during session    Heart Rate at rest  Heart Rate during session    SPO2 at rest %  SPO2 during session %     Patient education  Patient educated on role of Physical Therapy, risks of immobility, safety and plan of care, energy conservation and  importance of mobility while in hospital       Patient response to education:   Pt verbalized understanding Pt demonstrated skill Pt requires further education in this area   Yes Partial Yes      Treatment:  Patient practiced and was instructed/facilitated in the following treatment: Patient Sat edge of bed 10 minutes with Minimal assist of 1 to increase dynamic sitting balance and activity tolerance. Pt. Stood with verbal cues and assist for balance. Pt. Returned to supine and hob tilted with Max A 2 to pull up. Therapeutic Exercises:  not performed         At end of session, patient in bed with alarm call light and phone within reach,   all lines and tubes intact, nursing notified. Patient would benefit from continued skilled Physical Therapy to improve functional independence and quality of life. Patient's/ family goals   rehab        ASSESSMENT: Patient exhibits decreased strength, balance, coordination impairing functional mobility. Decreased motor control on Left lower extremity. Pt. With poor endurance/strength. Pt. requires skilled intervention to progress activity to monitor vital signs and response to activity.        Plan of Care:     -Bed Mobility: Lower extremity exercises , Upper extremity exercises  and Trunk control activities   -Sitting Balance: Incorporate reaching activities to activate trunk muscles   -Standing Balance: Perform strengthening exercises in standing to promote motor control with or without upper extremity support  and Instruct patient on adequate base of support to maintain balance -Transfers: Provide instruction on proper hand and foot position for adequate transfer of weight onto lower extremities and use of gait device, Cues for hand placement, technique and safety, Facilitate weight shift forward on to lower extremities and provide necessary stabilization of bilateral lower extremities , Support transfer of weight on to lower extremities and Provide stabilization to prevent fall   -Gait: Gait training, Standing activities to improve: base of support, weight shift, weight bearing  and Exercises to improve hip and knee control   -Endurance: Utilize Supervised activities to increase level of endurance to allow for safe functional mobility including transfers and gait     Patient and or family understand(s) diagnosis, prognosis, and plan of care. Frequency of treatments: Patient will be seen  3-5 /week .        Time in  1115  Time out  1132  Total Treatment Time  17 minutes        CPT codes:    Therapeutic activities (81568)   17 minutes  1 unit(s)    Dinorah Ray PTA  Lic# [id number]

## 2021-01-05 NOTE — DISCHARGE SUMMARY
[facility] Physician Discharge Summary       [facility]  [address]  [facility]  [phone]  Go to      Flavio Heck MD  [address] [facility]  [phone]    Schedule an appointment as soon as possible for a visit in 1 week      [address] [place] [address], [address] [place] [address], [address] [phone]      As needed    [facility], [address]  [facility] [phone]      As needed          See your PCP within 1 week of discharge      Activity level: Slowly increase as tolerated    Diet: DIET GENERAL; Dysphagia Minced and Moist; Mildly Thick (Nectar); Daily Fluid Restriction: 1500 ml    Labs: None are pending at the discharge    Condition at discharge: Stable    Dispo: Return to home setting    Continue supplemental oxygen via nasal canula @ 2 LPM round-the-clock. Continue CPAP / BiPAP during sleep as prior to admission. Patient ID:  Milady Brown  [id number]  63 y.o.  1960    Admit date: 12/26/2020    Discharge date and time:  1/5/2021  3:10 PM    Admission Diagnoses: Principal Problem:    Hyponatremia  Active Problems:    CVA (cerebral vascular accident) ([address]    Diabetes mellitus ([address]  Resolved Problems:    * No resolved hospital problems. *      Discharge Diagnoses: Principal Problem:    Hyponatremia  Active Problems:    CVA (cerebral vascular accident) ([address]    Diabetes mellitus ([address]  Resolved Problems:    * No resolved hospital problems. *      Consults:  IP CONSULT TO NEPHROLOGY  IP CONSULT TO CARDIOLOGY  IP CONSULT TO GI  IP CONSULT TO CRITICAL CARE    Procedures: None significant except if described in hospital course.     Hospital Course:   He presented with a fall from home but has a h/o  CVA with left-sided weakness, diabetes mellitus and hyperlipidemia      Nephrology was consulted and they said that they hyponatremia believed to be secondary to multiple factors including thiazide diuretic use, beer abuse limiting free water excretion due to low solute load as well as possibility of underlying SIADH. Cabrera Thomson Serum sodium is 129 mmol/L today. Will need close monitoring.     Acute kidney injury. FeNa <1% suggesting possible component of decreased effective renal perfusion in the setting of the hypotension. Resolved metabolic acidosis with increased anion gap.  Due to both type a lactic acidosis\secondary to tissue hypoperfusion as well as a possible Type B due to  use of Metformin.       Resolved Hypotension. Resolved. Blood pressure at target of less than 130/80 mm Hg  Add antihypertensive if needed later on.      EGD on 1/4/2021 revealing mild antral gastritis and a small sliding hiatal hernia  GI status from the point of view Plavix and aspirin may be resumed and patient may be discharged with outpatient follow-up    Microcytic Anemia. Remained stable after 1 unit PRBC transfusion on 12/28. Ferritin 125 with an iron sat 7%  Would treat with IV Fe++ once infection cleared and pt off antibx     Hypocalcemia with vitamin D deficiency. Hypocalcemia also in association with   hypoalbumionemia. Ionized calcium was normal.  Continue vitamin D supplement.     Resolved elevated LFTs probably secondary to mild hypotension ischemic hepatitis  R Factor 17.1 indicating hepatocellular injury  Acute hepatitis panel, MERLIN, Igg all negative   RUQ US with mildly enlarged fatty liver      Has been on levaquin and flagyl per gi but no need to continue. I spoke to  mrsa negative, bcx negative,    sputum stain negative for organisms.       Discharge Exam:  Vitals:    01/04/21 1858 01/04/21 2330 01/05/21 0212 01/05/21 0817   BP: 120/60 116/62  129/69   Pulse: 79 81 75 74   Resp: 20 18  16   Temp: 98.5 °F (36.9 °C) 98.5 °F (36.9 °C)  98.1 °F (36.7 °C)   TempSrc: Oral Oral  Oral SpO2: 95% 97%  97%   Weight:       Height:           No acute distress moist membranes   Normocephalic, without obvious abnormality, atraumatic, external ears without lesions,   Neck supple no cervical lymphadenopathy  Heart has a regular rate and rhythm no murmur  Lungs are clear to auscultation bilaterally with equal movements. Abdomen is soft nontender nondistended no rebound or guarding. No significant peripheral edema good peripheral perfusion. No significant rashes or new skin lesions. No new focality on neuro exam which is overall grossly intact. Affect and mood seem to be appropriate     I/O last 3 completed shifts: In: 240 [P.O.:240]  Out: 3100 [Urine:3100]  No intake/output data recorded. LABS:  Recent Labs     01/03/21 0440 01/04/21 0432 01/05/21 0652   * 131* 129*   K 4.4 4.5 4.4   CL 98 99 97*   CO2 20* 22 22   BUN 16 14 14   CREATININE 0.8 0.8 0.8   GLUCOSE 67* 80 66*   CALCIUM 7.8* 7.8* 7.9*       Recent Labs     01/03/21 0440 01/03/21 0440 01/04/21 0432 01/04/21 2053 01/05/21  0652 01/05/21  1003   WBC 12.0*  --  12.7*  --  13.6*  --    RBC 2.61*  --  2.79*  --  2.83*  --    HGB 7.4*   < > 7.8* 8.1* 7.9* 8.3*   HCT 22.8*   < > 24.3* 26.0* 25.3* 25.2*   MCV 87.4  --  87.1  --  89.4  --    MCH 28.4  --  28.0  --  27.9  --    MCHC 32.5  --  32.1  --  31.2*  --    RDW 16.4*  --  16.1*  --  15.9*  --      --  240  --  270  --    MPV 9.6  --  9.7  --  9.5  --     < > = values in this interval not displayed. No results for input(s): POCGLU in the last 72 hours.     CBC:   Lab Results   Component Value Date    WBC 13.6 01/05/2021    RBC 2.83 01/05/2021    HGB 8.3 01/05/2021    HCT 25.2 01/05/2021    MCV 89.4 01/05/2021    MCH 27.9 01/05/2021    MCHC 31.2 01/05/2021    RDW 15.9 01/05/2021     01/05/2021    MPV 9.5 01/05/2021     CMP:    Lab Results   Component Value Date     01/05/2021    K 4.4 01/05/2021    K 3.9 01/01/2021    CL 97 01/05/2021 CO2 22 01/05/2021    BUN 14 01/05/2021    CREATININE 0.8 01/05/2021    GFRAA >60 01/05/2021    LABGLOM >60 01/05/2021    GLUCOSE 66 01/05/2021    PROT 5.3 01/04/2021    LABALBU 2.4 01/04/2021    CALCIUM 7.9 01/05/2021    BILITOT 0.8 01/04/2021    ALKPHOS 100 01/04/2021    AST 35 01/04/2021    ALT 46 01/04/2021       Imaging:  Xr Chest (2 Vw)    Result Date: 12/26/2020  EXAMINATION: TWO XRAY VIEWS OF THE CHEST 12/26/2020 1:27 am COMPARISON: 11/11/2015 HISTORY: ORDERING SYSTEM PROVIDED HISTORY: shortness of breath TECHNOLOGIST PROVIDED HISTORY: Reason for exam:->shortness of breath FINDINGS: Cardiac silhouette top-normal.  Lungs grossly clear. No sizable pleural effusion. No pneumothorax. Multilevel thoracic spondylosis. Multiple old healed left rib fractures noted. No acute cardiopulmonary process. Xr Shoulder Left (min 2 Views)    Result Date: 12/26/2020  EXAMINATION: THREE XRAY VIEWS OF THE LEFT SHOULDER 12/26/2020 1:27 am COMPARISON: None. HISTORY: ORDERING SYSTEM PROVIDED HISTORY: fall TECHNOLOGIST PROVIDED HISTORY: Reason for exam:->fall FINDINGS: There is no acute fracture or dislocation in the left shoulder. The bones are somewhat osteopenic. Degenerative changes of glenohumeral joint are seen. The humeral head is high-riding, suggestive of chronic rotator cuff tear. Multiple old healed left rib fractures are seen. No acute fracture or dislocation in the left shoulder.     Xr Chest Portable    Result Date: 12/27/2020 EXAMINATION: ONE XRAY VIEW OF THE CHEST 12/27/2020 2:46 pm COMPARISON: Comparison is dated December 27, 2020 at 0840 hours HISTORY: [address]: sob TECHNOLOGIST PROVIDED HISTORY: Reason for exam:->sob FINDINGS: Cardiac and mediastinal contours are unchanged. Bilateral primarily lower lobe pulmonary infiltrates do not appear significantly changed. Remote healed left rib fractures are noted. No evidence for pneumothorax. Overall stable appearance from prior exam    Bilateral pulmonary infiltrates no significant interval change    Xr Chest Portable    Result Date: 12/27/2020  EXAMINATION: ONE XRAY VIEW OF THE CHEST 12/27/2020 8:38 am COMPARISON: 12/26/2020 HISTORY: ORDERING SYSTEM PROVIDED HISTORY: SOB TECHNOLOGIST PROVIDED HISTORY: Reason for exam:->SOB FINDINGS: Left basilar pulmonary opacity is seen. The right lung is grossly clear. The cardiomediastinal contour is unremarkable. No pneumothorax is seen. Old healed left posterior rib fractures are noted    Left basilar pulmonary opacity which may represent atelectasis, pneumonia, effusion or a combination thereof.         Patient Instructions:   Current Discharge Medication List      CONTINUE these medications which have NOT CHANGED    Details   melatonin 3 MG TABS tablet Take 9 mg by mouth nightly as needed      aspirin 81 MG EC tablet Take 81 mg by mouth daily      hydroCHLOROthiazide (HYDRODIURIL) 25 MG tablet Take 25 mg by mouth daily      acetaminophen 650 MG TABS Take 650 mg by mouth every 4 hours as needed  Qty: 120 tablet, Refills: 3      glimepiride (AMARYL) 2 MG tablet Take 1 tablet by mouth daily (with breakfast)  Qty: 30 tablet, Refills: 3      !! insulin lispro (HUMALOG) 100 UNIT/ML injection vial Inject 0-12 Units into the skin 3 times daily (with meals)  Qty: 1 vial, Refills: 3      !! insulin lispro (HUMALOG) 100 UNIT/ML injection vial Inject 0-6 Units into the skin nightly  Qty: 1 vial, Refills: 3 metFORMIN (GLUCOPHAGE) 500 MG tablet Take 1 tablet by mouth 2 times daily (with meals)  Qty: 60 tablet, Refills: 3      atorvastatin (LIPITOR) 40 MG tablet Take 1 tablet by mouth nightly  Qty: 30 tablet, Refills: 3      clopidogrel (PLAVIX) 75 MG tablet Take 1 tablet by mouth daily  Qty: 30 tablet, Refills: 3      vitamin B-1 100 MG tablet Take 1 tablet by mouth daily  Qty: 30 tablet, Refills: 3       !! - Potential duplicate medications found. Please discuss with provider. Note that > than 30 minutes was spent in preparing discharge papers, discussing discharge with patient, medication review, etc.    NOTE: This report was transcribed using voice recognition software. Every effort was made to ensure accuracy; however, inadvertent computerized transcription errors may be present.      Signed:  Electronically signed by Junior Garcia MD on 1/5/2021 at 3:10 PM

## 2021-01-06 LAB
Lab: NORMAL
REPORT: NORMAL
THIS TEST SENT TO: NORMAL

## 2021-01-08 NOTE — PROGRESS NOTES
Physician Progress Note      [facility]  [facility] #:                  [id number]  :                       1960  ADMIT DATE:       2020 12:35 AM  DISCH DATE:        2021 5:59 PM  RESPONDING  PROVIDER #:        FREDDY Mckee TEXT:    Dear Attending Provider,    Pt admitted with hyponatremia and noted to have sepsis. Please document the   Present on admission status of Sepsis. The medical record reflects the following:  Risk Factors: Diabetes, bilateral pneumonia  Clinical Indicators:: wbc-14.2, lactic acid- 4.9; : wbc-15.5, 92/56,   102, 43,  T-99.8,  PN:Sepsis; :T-100.2  Treatment: IVF bolus, continuous IVF, maxipime,  levaquin and vanco.    Thank you,  Krista Woo RN, BSN, [facility]  [phone]  Options provided:  -- Sepsis Present on admission  -- Sepsis developed after admission  -- Other - I will add my own diagnosis  -- Disagree - Not applicable / Not valid  -- Disagree - Clinically unable to determine / Unknown  -- Refer to Clinical Documentation Reviewer    PROVIDER RESPONSE TEXT:    This patient has sepsis Present on admission. Query created by:  Shelley Montes on 2021 12:51 PM      Electronically signed by:  FREDDY Marte 2021 2:26 PM

## 2021-02-14 ENCOUNTER — APPOINTMENT (OUTPATIENT)
Dept: GENERAL RADIOLOGY | Age: 61
DRG: 622 | End: 2021-02-14
Payer: MEDICARE

## 2021-02-14 ENCOUNTER — HOSPITAL ENCOUNTER (INPATIENT)
Age: 61
LOS: 9 days | Discharge: NURSING, REHAB OR LONG-TERM CARE | DRG: 622 | End: 2021-02-23
Attending: EMERGENCY MEDICINE | Admitting: INTERNAL MEDICINE
Payer: MEDICARE

## 2021-02-14 DIAGNOSIS — A41.9 SEPSIS, DUE TO UNSPECIFIED ORGANISM, UNSPECIFIED WHETHER ACUTE ORGAN DYSFUNCTION PRESENT (HCC): Primary | ICD-10-CM

## 2021-02-14 DIAGNOSIS — E11.621 DIABETIC ULCER OF LEFT HEEL ASSOCIATED WITH TYPE 2 DIABETES MELLITUS, UNSPECIFIED ULCER STAGE (HCC): ICD-10-CM

## 2021-02-14 DIAGNOSIS — R73.9 HYPERGLYCEMIA: ICD-10-CM

## 2021-02-14 DIAGNOSIS — L97.429 DIABETIC ULCER OF LEFT HEEL ASSOCIATED WITH TYPE 2 DIABETES MELLITUS, UNSPECIFIED ULCER STAGE (HCC): ICD-10-CM

## 2021-02-14 PROBLEM — E11.628 DIABETIC FOOT INFECTION (HCC): Status: ACTIVE | Noted: 2021-02-14

## 2021-02-14 PROBLEM — L08.9 DIABETIC FOOT INFECTION (HCC): Status: ACTIVE | Noted: 2021-02-14

## 2021-02-14 LAB
ALBUMIN SERPL-MCNC: 3.7 G/DL (ref 3.5–5.2)
ALP BLD-CCNC: 134 U/L (ref 40–129)
ALT SERPL-CCNC: 22 U/L (ref 0–40)
ANION GAP SERPL CALCULATED.3IONS-SCNC: 14 MMOL/L (ref 7–16)
AST SERPL-CCNC: 32 U/L (ref 0–39)
BACTERIA: ABNORMAL /HPF
BASOPHILS ABSOLUTE: 0.04 E9/L (ref 0–0.2)
BASOPHILS RELATIVE PERCENT: 0.5 % (ref 0–2)
BETA-HYDROXYBUTYRATE: 0.21 MMOL/L (ref 0.02–0.27)
BILIRUB SERPL-MCNC: 0.6 MG/DL (ref 0–1.2)
BILIRUBIN URINE: NEGATIVE
BLOOD, URINE: ABNORMAL
BUN BLDV-MCNC: 30 MG/DL (ref 8–23)
CALCIUM SERPL-MCNC: 9.3 MG/DL (ref 8.6–10.2)
CHLORIDE BLD-SCNC: 96 MMOL/L (ref 98–107)
CLARITY: ABNORMAL
CO2: 20 MMOL/L (ref 22–29)
COLOR: YELLOW
CREAT SERPL-MCNC: 0.8 MG/DL (ref 0.7–1.2)
EOSINOPHILS ABSOLUTE: 0.02 E9/L (ref 0.05–0.5)
EOSINOPHILS RELATIVE PERCENT: 0.2 % (ref 0–6)
GFR AFRICAN AMERICAN: >60
GFR NON-AFRICAN AMERICAN: >60 ML/MIN/1.73
GLUCOSE BLD-MCNC: 257 MG/DL (ref 74–99)
GLUCOSE URINE: 500 MG/DL
HCT VFR BLD CALC: 30.7 % (ref 37–54)
HEMOGLOBIN: 9.6 G/DL (ref 12.5–16.5)
IMMATURE GRANULOCYTES #: 0.04 E9/L
IMMATURE GRANULOCYTES %: 0.5 % (ref 0–5)
KETONES, URINE: NEGATIVE MG/DL
LEUKOCYTE ESTERASE, URINE: ABNORMAL
LYMPHOCYTES ABSOLUTE: 1.08 E9/L (ref 1.5–4)
LYMPHOCYTES RELATIVE PERCENT: 13.4 % (ref 20–42)
MCH RBC QN AUTO: 25.8 PG (ref 26–35)
MCHC RBC AUTO-ENTMCNC: 31.3 % (ref 32–34.5)
MCV RBC AUTO: 82.5 FL (ref 80–99.9)
MONOCYTES ABSOLUTE: 0.55 E9/L (ref 0.1–0.95)
MONOCYTES RELATIVE PERCENT: 6.8 % (ref 2–12)
NEUTROPHILS ABSOLUTE: 6.3 E9/L (ref 1.8–7.3)
NEUTROPHILS RELATIVE PERCENT: 78.6 % (ref 43–80)
NITRITE, URINE: NEGATIVE
PDW BLD-RTO: 15.3 FL (ref 11.5–15)
PH UA: >=9 (ref 5–9)
PH VENOUS: 7.42 (ref 7.35–7.45)
PLATELET # BLD: 361 E9/L (ref 130–450)
PMV BLD AUTO: 9.6 FL (ref 7–12)
POTASSIUM SERPL-SCNC: 4.4 MMOL/L (ref 3.5–5)
PROTEIN UA: 100 MG/DL
RBC # BLD: 3.72 E12/L (ref 3.8–5.8)
RBC UA: ABNORMAL /HPF (ref 0–2)
SEDIMENTATION RATE, ERYTHROCYTE: 87 MM/HR (ref 0–15)
SODIUM BLD-SCNC: 130 MMOL/L (ref 132–146)
SPECIFIC GRAVITY UA: 1.01 (ref 1–1.03)
TOTAL PROTEIN: 7.8 G/DL (ref 6.4–8.3)
TROPONIN: <0.01 NG/ML (ref 0–0.03)
UROBILINOGEN, URINE: 2 E.U./DL
WBC # BLD: 8 E9/L (ref 4.5–11.5)
WBC UA: ABNORMAL /HPF (ref 0–5)

## 2021-02-14 PROCEDURE — 80053 COMPREHEN METABOLIC PANEL: CPT

## 2021-02-14 PROCEDURE — 82010 KETONE BODYS QUAN: CPT

## 2021-02-14 PROCEDURE — 87088 URINE BACTERIA CULTURE: CPT

## 2021-02-14 PROCEDURE — 71045 X-RAY EXAM CHEST 1 VIEW: CPT

## 2021-02-14 PROCEDURE — 87040 BLOOD CULTURE FOR BACTERIA: CPT

## 2021-02-14 PROCEDURE — 85025 COMPLETE CBC W/AUTO DIFF WBC: CPT

## 2021-02-14 PROCEDURE — 87070 CULTURE OTHR SPECIMN AEROBIC: CPT

## 2021-02-14 PROCEDURE — 82800 BLOOD PH: CPT

## 2021-02-14 PROCEDURE — 99285 EMERGENCY DEPT VISIT HI MDM: CPT

## 2021-02-14 PROCEDURE — 1200000000 HC SEMI PRIVATE

## 2021-02-14 PROCEDURE — 2580000003 HC RX 258: Performed by: EMERGENCY MEDICINE

## 2021-02-14 PROCEDURE — 83036 HEMOGLOBIN GLYCOSYLATED A1C: CPT

## 2021-02-14 PROCEDURE — 87077 CULTURE AEROBIC IDENTIFY: CPT

## 2021-02-14 PROCEDURE — 86140 C-REACTIVE PROTEIN: CPT

## 2021-02-14 PROCEDURE — 93005 ELECTROCARDIOGRAM TRACING: CPT | Performed by: EMERGENCY MEDICINE

## 2021-02-14 PROCEDURE — 6370000000 HC RX 637 (ALT 250 FOR IP): Performed by: EMERGENCY MEDICINE

## 2021-02-14 PROCEDURE — 84484 ASSAY OF TROPONIN QUANT: CPT

## 2021-02-14 PROCEDURE — 96375 TX/PRO/DX INJ NEW DRUG ADDON: CPT

## 2021-02-14 PROCEDURE — 96365 THER/PROPH/DIAG IV INF INIT: CPT

## 2021-02-14 PROCEDURE — 87186 SC STD MICRODIL/AGAR DIL: CPT

## 2021-02-14 PROCEDURE — 96361 HYDRATE IV INFUSION ADD-ON: CPT

## 2021-02-14 PROCEDURE — 85651 RBC SED RATE NONAUTOMATED: CPT

## 2021-02-14 PROCEDURE — 73620 X-RAY EXAM OF FOOT: CPT

## 2021-02-14 PROCEDURE — 81001 URINALYSIS AUTO W/SCOPE: CPT

## 2021-02-14 PROCEDURE — 87150 DNA/RNA AMPLIFIED PROBE: CPT

## 2021-02-14 PROCEDURE — 6360000002 HC RX W HCPCS: Performed by: EMERGENCY MEDICINE

## 2021-02-14 PROCEDURE — 99223 1ST HOSP IP/OBS HIGH 75: CPT | Performed by: INTERNAL MEDICINE

## 2021-02-14 RX ORDER — 0.9 % SODIUM CHLORIDE 0.9 %
1000 INTRAVENOUS SOLUTION INTRAVENOUS ONCE
Status: COMPLETED | OUTPATIENT
Start: 2021-02-14 | End: 2021-02-14

## 2021-02-14 RX ORDER — NICOTINE POLACRILEX 4 MG
15 LOZENGE BUCCAL PRN
Status: DISCONTINUED | OUTPATIENT
Start: 2021-02-14 | End: 2021-02-23 | Stop reason: HOSPADM

## 2021-02-14 RX ORDER — ACETAMINOPHEN 325 MG/1
650 TABLET ORAL EVERY 4 HOURS PRN
Status: DISCONTINUED | OUTPATIENT
Start: 2021-02-14 | End: 2021-02-15 | Stop reason: SDUPTHER

## 2021-02-14 RX ORDER — ONDANSETRON 2 MG/ML
4 INJECTION INTRAMUSCULAR; INTRAVENOUS EVERY 6 HOURS PRN
Status: DISCONTINUED | OUTPATIENT
Start: 2021-02-14 | End: 2021-02-15

## 2021-02-14 RX ORDER — DEXTROSE MONOHYDRATE 25 G/50ML
12.5 INJECTION, SOLUTION INTRAVENOUS PRN
Status: DISCONTINUED | OUTPATIENT
Start: 2021-02-14 | End: 2021-02-23 | Stop reason: HOSPADM

## 2021-02-14 RX ORDER — ACETAMINOPHEN 325 MG/1
650 TABLET ORAL ONCE
Status: COMPLETED | OUTPATIENT
Start: 2021-02-14 | End: 2021-02-14

## 2021-02-14 RX ORDER — DEXTROSE MONOHYDRATE 50 MG/ML
100 INJECTION, SOLUTION INTRAVENOUS PRN
Status: DISCONTINUED | OUTPATIENT
Start: 2021-02-14 | End: 2021-02-23 | Stop reason: HOSPADM

## 2021-02-14 RX ADMIN — VANCOMYCIN HYDROCHLORIDE 1250 MG: 10 INJECTION, POWDER, LYOPHILIZED, FOR SOLUTION INTRAVENOUS at 21:42

## 2021-02-14 RX ADMIN — ACETAMINOPHEN 650 MG: 325 TABLET, FILM COATED ORAL at 18:59

## 2021-02-14 RX ADMIN — SODIUM CHLORIDE 1000 ML: 9 INJECTION, SOLUTION INTRAVENOUS at 19:03

## 2021-02-14 RX ADMIN — CEFEPIME 2000 MG: 2 INJECTION, POWDER, FOR SOLUTION INTRAVENOUS at 21:08

## 2021-02-14 ASSESSMENT — ENCOUNTER SYMPTOMS
BACK PAIN: 0
PHOTOPHOBIA: 0
CONSTIPATION: 0
SHORTNESS OF BREATH: 0
DIARRHEA: 0
SINUS PAIN: 0
ABDOMINAL PAIN: 0
SORE THROAT: 0
VOMITING: 0
SINUS PRESSURE: 0
NAUSEA: 0
COUGH: 0
WHEEZING: 0
RHINORRHEA: 0

## 2021-02-14 ASSESSMENT — PAIN SCALES - GENERAL: PAINLEVEL_OUTOF10: 10

## 2021-02-14 ASSESSMENT — PAIN DESCRIPTION - ORIENTATION: ORIENTATION: RIGHT;LEFT

## 2021-02-14 NOTE — ED NOTES
Bed: 03  Expected date:   Expected time:   Means of arrival:   Comments:     Deonte Hoffmann RN  02/14/21 9214

## 2021-02-14 NOTE — ED PROVIDER NOTES
Patient is a 57-year-old male who presents with a chief complaint of fever and heel wounds. The patient states that for the past 3 weeks he has had bilateral heel wounds that have been dressed and seen by infectious disease at the nursing home. Patient does not recall if he is on any antibiotics, he is not sure what medications he has been given by the nursing home. He did have a fever that started today with a T-max of 102, no treatment prior to arrival.  The patient denies any lightheadedness, dizziness, cough, chest pain, shortness of breath, abdominal pain, nausea, vomiting. He does admit to pain of his heels but states that is not any worse than it was before. No recent trauma or falls. He does have a previous history of CVA with residual left-sided weakness. The history is provided by the patient. No  was used. Review of Systems   Constitutional: Positive for fever. Negative for chills and fatigue. HENT: Negative for congestion, rhinorrhea, sinus pressure, sinus pain, sneezing, sore throat and tinnitus. Eyes: Negative for photophobia and visual disturbance. Respiratory: Negative for cough, shortness of breath and wheezing. Cardiovascular: Negative for chest pain and palpitations. Gastrointestinal: Negative for abdominal pain, constipation, diarrhea, nausea and vomiting. Genitourinary: Negative for dysuria, frequency and hematuria. Musculoskeletal: Negative for back pain, neck pain and neck stiffness. Skin: Positive for wound. Negative for rash. Neurological: Negative for dizziness, light-headedness and headaches. Psychiatric/Behavioral: Negative for agitation, behavioral problems and confusion. Physical Exam  Constitutional:       General: He is not in acute distress. Appearance: He is not toxic-appearing. HENT:      Head: Normocephalic. Mouth/Throat:      Mouth: Mucous membranes are moist.   Eyes:      General: No scleral icterus. Pupils: Pupils are equal, round, and reactive to light. Neck:      Musculoskeletal: Normal range of motion. No neck rigidity. Cardiovascular:      Rate and Rhythm: Tachycardia present. Heart sounds: No murmur. No gallop. Pulmonary:      Effort: No respiratory distress. Breath sounds: Normal breath sounds. No wheezing. Abdominal:      General: There is no distension. Palpations: Abdomen is soft. Tenderness: There is no abdominal tenderness. Musculoskeletal: Normal range of motion. General: No swelling or deformity. Lymphadenopathy:      Cervical: No cervical adenopathy. Skin:     General: Skin is warm. Capillary Refill: Capillary refill takes less than 2 seconds. Coloration: Skin is not jaundiced. Findings: No bruising. Comments: Patient has bilateral heel wounds. The wound of the right heel does not appear infected, there is pain granulation tissue and has no drainage, it is 1.5cm x 1.5cm. The left heel wound is approximately 4 cm x 4 cm with purulent drainage and foul odor. DP and PT +2. No other sensory deficit of the extremities. Neurological:      Mental Status: He is alert and oriented to person, place, and time. Cranial Nerves: No cranial nerve deficit. Motor: No weakness. Comments: Patient does have residual left upper extremity weakness from previous CVA. There is some mild left lower extremity weakness but he is still able to lift his leg. Psychiatric:         Mood and Affect: Mood normal.         Thought Content:  Thought content normal.          Procedures     MDM  Number of Diagnoses or Management Options  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 2 diabetes mellitus, unspecified ulcer stage (HCC)  Hyperglycemia  Sepsis, due to unspecified organism, unspecified whether acute organ dysfunction present ([facility] [address] Diagnosis management comments: Patient is a 69-year-old male who presented via EMS from the nursing home with a chief complaint of fever and heel wounds. The patient is a known diabetic and is currently at the nursing home for rehab following a stroke. He has residual left-sided weakness. He has had bilateral heel wounds for 3 weeks but the patient per documentation has not received any antibiotics at this time. He did have a fever of T-max 102 today at the nursing home and when he presented he did have a foul-smelling left heel wound to have purulent drainage. The patient was tachycardic upon arrival.  He was given IV fluids. He is hyperglycemic but not in DKA. The patient was started on vancomycin and cefepime because he is allergic to penicillins. Wound culture was obtained. The patient will be admitted for further treatment and evaluation. No further questions. ED Course as of Feb 14 2057   Bushremberto KrishnanRajendra Feb 14, 2021 2028 Patient was updated on his results. He is agreeable for admission. Patient again does not recall what podiatrist did a debridement on his heel wounds in the past.    [MS]      ED Course User Index  [MS] Yamilet العلي, DO      --------------------------------------------- PAST HISTORY ---------------------------------------------  Past Medical History:  has a past medical history of Cerebral artery occlusion with cerebral infarction ([facility] [address] Diabetes mellitus ([facility] [address] Hemiparesis affecting left side as late effect of cerebrovascular accident [facility]), and Hypertension. Past Surgical History:  has a past surgical history that includes Tonsillectomy; Knee arthroscopy; Finger amputation; and Upper gastrointestinal endoscopy (N/A, 1/4/2021). Social History:  reports that he has quit smoking. He has never used smokeless tobacco. He reports current alcohol use. He reports that he does not use drugs. Family History: family history is not on file. The patients home medications have been reviewed.     Allergies: Pcn [penicillins]    -------------------------------------------------- RESULTS -------------------------------------------------    LABS:  Results for orders placed or performed during the hospital encounter of 02/14/21   CBC Auto Differential   Result Value Ref Range    WBC 8.0 4.5 - 11.5 E9/L    RBC 3.72 (L) 3.80 - 5.80 E12/L    Hemoglobin 9.6 (L) 12.5 - 16.5 g/dL    Hematocrit 30.7 (L) 37.0 - 54.0 %    MCV 82.5 80.0 - 99.9 fL    MCH 25.8 (L) 26.0 - 35.0 pg    MCHC 31.3 (L) 32.0 - 34.5 %    RDW 15.3 (H) 11.5 - 15.0 fL    Platelets 649 571 - 229 E9/L    MPV 9.6 7.0 - 12.0 fL    Neutrophils % 78.6 43.0 - 80.0 %    Immature Granulocytes % 0.5 0.0 - 5.0 %    Lymphocytes % 13.4 (L) 20.0 - 42.0 %    Monocytes % 6.8 2.0 - 12.0 %    Eosinophils % 0.2 0.0 - 6.0 %    Basophils % 0.5 0.0 - 2.0 %    Neutrophils Absolute 6.30 1.80 - 7.30 E9/L    Immature Granulocytes # 0.04 E9/L    Lymphocytes Absolute 1.08 (L) 1.50 - 4.00 E9/L    Monocytes Absolute 0.55 0.10 - 0.95 E9/L    Eosinophils Absolute 0.02 (L) 0.05 - 0.50 E9/L    Basophils Absolute 0.04 0.00 - 0.20 E9/L   Troponin   Result Value Ref Range    Troponin <0.01 0.00 - 0.03 ng/mL   Sedimentation Rate   Result Value Ref Range    Sed Rate 87 (H) 0 - 15 mm/Hr   Beta-Hydroxybutyrate   Result Value Ref Range    Beta-Hydroxybutyrate 0.21 0.02 - 0.27 mmol/L   pH, venous   Result Value Ref Range    pH, Pk 7.42 7.35 - 7.45   Comprehensive metabolic panel   Result Value Ref Range    Sodium 130 (L) 132 - 146 mmol/L    Potassium 4.4 3.5 - 5.0 mmol/L    Chloride 96 (L) 98 - 107 mmol/L    CO2 20 (L) 22 - 29 mmol/L    Anion Gap 14 7 - 16 mmol/L    Glucose 257 (H) 74 - 99 mg/dL    BUN 30 (H) 8 - 23 mg/dL    CREATININE 0.8 0.7 - 1.2 mg/dL    GFR Non-African American >60 >=60 mL/min/1.73    GFR African American >60     Calcium 9.3 8.6 - 10.2 mg/dL    Total Protein 7.8 6.4 - 8.3 g/dL    Albumin 3.7 3.5 - 5.2 g/dL Time: 2027. Spoke with Dr. Chase Ayon, hospitalist.  Discussed case. They will admit the patient. This patient's ED course included: a personal history and physicial examination, re-evaluation prior to disposition, IV medications, cardiac monitoring and continuous pulse oximetry    This patient has remained hemodynamically stable during their ED course. Diagnosis:  1. Sepsis, due to unspecified organism, unspecified whether acute organ dysfunction present ([address]    2. 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 2 diabetes mellitus, unspecified ulcer stage ([address]    3. Hyperglycemia        Disposition:  Patient's disposition: Admit to telemetry  Patient's condition is stable.          Pia Saucedo DO  02/14/21 2057

## 2021-02-15 ENCOUNTER — APPOINTMENT (OUTPATIENT)
Dept: INTERVENTIONAL RADIOLOGY/VASCULAR | Age: 61
DRG: 622 | End: 2021-02-15
Payer: MEDICARE

## 2021-02-15 ENCOUNTER — ANESTHESIA EVENT (OUTPATIENT)
Dept: OPERATING ROOM | Age: 61
DRG: 622 | End: 2021-02-15
Payer: MEDICARE

## 2021-02-15 PROBLEM — N39.0 UTI (URINARY TRACT INFECTION): Status: ACTIVE | Noted: 2021-02-15

## 2021-02-15 LAB
C-REACTIVE PROTEIN: 2.3 MG/DL (ref 0–0.4)
EKG ATRIAL RATE: 110 BPM
EKG P AXIS: 6 DEGREES
EKG P-R INTERVAL: 146 MS
EKG Q-T INTERVAL: 348 MS
EKG QRS DURATION: 76 MS
EKG QTC CALCULATION (BAZETT): 470 MS
EKG R AXIS: -13 DEGREES
EKG T AXIS: 3 DEGREES
EKG VENTRICULAR RATE: 110 BPM
HBA1C MFR BLD: 6.8 % (ref 4–5.6)
METER GLUCOSE: 112 MG/DL (ref 74–99)
METER GLUCOSE: 138 MG/DL (ref 74–99)
METER GLUCOSE: 160 MG/DL (ref 74–99)
METER GLUCOSE: 183 MG/DL (ref 74–99)
METER GLUCOSE: 184 MG/DL (ref 74–99)

## 2021-02-15 PROCEDURE — 99232 SBSQ HOSP IP/OBS MODERATE 35: CPT | Performed by: INTERNAL MEDICINE

## 2021-02-15 PROCEDURE — 2580000003 HC RX 258: Performed by: SPECIALIST

## 2021-02-15 PROCEDURE — 6360000002 HC RX W HCPCS: Performed by: INTERNAL MEDICINE

## 2021-02-15 PROCEDURE — 6370000000 HC RX 637 (ALT 250 FOR IP): Performed by: INTERNAL MEDICINE

## 2021-02-15 PROCEDURE — 6360000002 HC RX W HCPCS: Performed by: SPECIALIST

## 2021-02-15 PROCEDURE — 1200000000 HC SEMI PRIVATE

## 2021-02-15 PROCEDURE — 82962 GLUCOSE BLOOD TEST: CPT

## 2021-02-15 PROCEDURE — 2580000003 HC RX 258: Performed by: INTERNAL MEDICINE

## 2021-02-15 PROCEDURE — 93923 UPR/LXTR ART STDY 3+ LVLS: CPT

## 2021-02-15 RX ORDER — CLOPIDOGREL BISULFATE 75 MG/1
75 TABLET ORAL DAILY
Status: DISCONTINUED | OUTPATIENT
Start: 2021-02-15 | End: 2021-02-23 | Stop reason: HOSPADM

## 2021-02-15 RX ORDER — SODIUM CHLORIDE 9 MG/ML
INJECTION, SOLUTION INTRAVENOUS CONTINUOUS
Status: DISCONTINUED | OUTPATIENT
Start: 2021-02-15 | End: 2021-02-18

## 2021-02-15 RX ORDER — CYCLOBENZAPRINE HCL 10 MG
5 TABLET ORAL 3 TIMES DAILY PRN
Status: DISCONTINUED | OUTPATIENT
Start: 2021-02-15 | End: 2021-02-16

## 2021-02-15 RX ORDER — SODIUM CHLORIDE 9 MG/ML
INJECTION, SOLUTION INTRAVENOUS EVERY 8 HOURS
Status: DISCONTINUED | OUTPATIENT
Start: 2021-02-15 | End: 2021-02-15

## 2021-02-15 RX ORDER — FAMOTIDINE 20 MG/1
20 TABLET, FILM COATED ORAL 2 TIMES DAILY
Status: DISCONTINUED | OUTPATIENT
Start: 2021-02-15 | End: 2021-02-15

## 2021-02-15 RX ORDER — SODIUM CHLORIDE 0.9 % (FLUSH) 0.9 %
10 SYRINGE (ML) INJECTION PRN
Status: DISCONTINUED | OUTPATIENT
Start: 2021-02-15 | End: 2021-02-23 | Stop reason: HOSPADM

## 2021-02-15 RX ORDER — ATORVASTATIN CALCIUM 40 MG/1
40 TABLET, FILM COATED ORAL NIGHTLY
Status: DISCONTINUED | OUTPATIENT
Start: 2021-02-15 | End: 2021-02-23 | Stop reason: HOSPADM

## 2021-02-15 RX ORDER — ONDANSETRON 2 MG/ML
4 INJECTION INTRAMUSCULAR; INTRAVENOUS EVERY 6 HOURS PRN
Status: DISCONTINUED | OUTPATIENT
Start: 2021-02-15 | End: 2021-02-23 | Stop reason: HOSPADM

## 2021-02-15 RX ORDER — PANTOPRAZOLE SODIUM 40 MG/1
40 TABLET, DELAYED RELEASE ORAL
Status: DISCONTINUED | OUTPATIENT
Start: 2021-02-15 | End: 2021-02-23 | Stop reason: HOSPADM

## 2021-02-15 RX ORDER — POLYETHYLENE GLYCOL 3350 17 G/17G
17 POWDER, FOR SOLUTION ORAL DAILY PRN
Status: DISCONTINUED | OUTPATIENT
Start: 2021-02-15 | End: 2021-02-23 | Stop reason: HOSPADM

## 2021-02-15 RX ORDER — LANOLIN ALCOHOL/MO/W.PET/CERES
9 CREAM (GRAM) TOPICAL NIGHTLY PRN
Status: DISCONTINUED | OUTPATIENT
Start: 2021-02-15 | End: 2021-02-23 | Stop reason: HOSPADM

## 2021-02-15 RX ORDER — INSULIN GLARGINE 100 [IU]/ML
10 INJECTION, SOLUTION SUBCUTANEOUS NIGHTLY
Status: DISCONTINUED | OUTPATIENT
Start: 2021-02-15 | End: 2021-02-16

## 2021-02-15 RX ORDER — HYDROCHLOROTHIAZIDE 25 MG/1
25 TABLET ORAL DAILY
Status: DISCONTINUED | OUTPATIENT
Start: 2021-02-15 | End: 2021-02-23 | Stop reason: HOSPADM

## 2021-02-15 RX ORDER — KETOROLAC TROMETHAMINE 30 MG/ML
15 INJECTION, SOLUTION INTRAMUSCULAR; INTRAVENOUS ONCE
Status: COMPLETED | OUTPATIENT
Start: 2021-02-15 | End: 2021-02-15

## 2021-02-15 RX ORDER — IPRATROPIUM BROMIDE AND ALBUTEROL SULFATE 2.5; .5 MG/3ML; MG/3ML
3 SOLUTION RESPIRATORY (INHALATION) EVERY 4 HOURS
Status: DISCONTINUED | OUTPATIENT
Start: 2021-02-15 | End: 2021-02-23 | Stop reason: HOSPADM

## 2021-02-15 RX ORDER — PROMETHAZINE HYDROCHLORIDE 25 MG/1
12.5 TABLET ORAL EVERY 6 HOURS PRN
Status: DISCONTINUED | OUTPATIENT
Start: 2021-02-15 | End: 2021-02-23 | Stop reason: HOSPADM

## 2021-02-15 RX ORDER — ACETAMINOPHEN 650 MG/1
650 SUPPOSITORY RECTAL EVERY 6 HOURS PRN
Status: DISCONTINUED | OUTPATIENT
Start: 2021-02-15 | End: 2021-02-23 | Stop reason: HOSPADM

## 2021-02-15 RX ORDER — SODIUM CHLORIDE 0.9 % (FLUSH) 0.9 %
10 SYRINGE (ML) INJECTION EVERY 12 HOURS SCHEDULED
Status: DISCONTINUED | OUTPATIENT
Start: 2021-02-15 | End: 2021-02-23 | Stop reason: HOSPADM

## 2021-02-15 RX ORDER — ACETAMINOPHEN 325 MG/1
650 TABLET ORAL EVERY 6 HOURS PRN
Status: DISCONTINUED | OUTPATIENT
Start: 2021-02-15 | End: 2021-02-23 | Stop reason: HOSPADM

## 2021-02-15 RX ORDER — ASPIRIN 81 MG/1
81 TABLET ORAL DAILY
Status: DISCONTINUED | OUTPATIENT
Start: 2021-02-15 | End: 2021-02-23 | Stop reason: HOSPADM

## 2021-02-15 RX ORDER — SODIUM CHLORIDE 9 MG/ML
INJECTION, SOLUTION INTRAVENOUS EVERY 12 HOURS
Status: DISCONTINUED | OUTPATIENT
Start: 2021-02-15 | End: 2021-02-23 | Stop reason: HOSPADM

## 2021-02-15 RX ADMIN — INSULIN GLARGINE 10 UNITS: 100 INJECTION, SOLUTION SUBCUTANEOUS at 20:48

## 2021-02-15 RX ADMIN — SODIUM CHLORIDE: 9 INJECTION, SOLUTION INTRAVENOUS at 21:51

## 2021-02-15 RX ADMIN — PANTOPRAZOLE SODIUM 40 MG: 40 TABLET, DELAYED RELEASE ORAL at 06:10

## 2021-02-15 RX ADMIN — VANCOMYCIN HYDROCHLORIDE 1250 MG: 10 INJECTION, POWDER, LYOPHILIZED, FOR SOLUTION INTRAVENOUS at 22:15

## 2021-02-15 RX ADMIN — Medication 9 MG: at 23:30

## 2021-02-15 RX ADMIN — SODIUM CHLORIDE: 9 INJECTION, SOLUTION INTRAVENOUS at 11:55

## 2021-02-15 RX ADMIN — ATORVASTATIN CALCIUM 40 MG: 40 TABLET, FILM COATED ORAL at 20:35

## 2021-02-15 RX ADMIN — CLOPIDOGREL BISULFATE 75 MG: 75 TABLET ORAL at 08:39

## 2021-02-15 RX ADMIN — VANCOMYCIN HYDROCHLORIDE 1250 MG: 10 INJECTION, POWDER, LYOPHILIZED, FOR SOLUTION INTRAVENOUS at 11:54

## 2021-02-15 RX ADMIN — INSULIN LISPRO 1 UNITS: 100 INJECTION, SOLUTION INTRAVENOUS; SUBCUTANEOUS at 17:08

## 2021-02-15 RX ADMIN — CYCLOBENZAPRINE 5 MG: 10 TABLET, FILM COATED ORAL at 20:35

## 2021-02-15 RX ADMIN — KETOROLAC TROMETHAMINE 15 MG: 30 INJECTION, SOLUTION INTRAMUSCULAR at 23:30

## 2021-02-15 RX ADMIN — Medication 10 ML: at 20:56

## 2021-02-15 RX ADMIN — INSULIN LISPRO 1 UNITS: 100 INJECTION, SOLUTION INTRAVENOUS; SUBCUTANEOUS at 20:48

## 2021-02-15 RX ADMIN — ENOXAPARIN SODIUM 40 MG: 40 INJECTION SUBCUTANEOUS at 08:38

## 2021-02-15 RX ADMIN — CEFEPIME HYDROCHLORIDE 2000 MG: 2 INJECTION, POWDER, FOR SOLUTION INTRAVENOUS at 05:13

## 2021-02-15 RX ADMIN — ACETAMINOPHEN 650 MG: 325 TABLET, FILM COATED ORAL at 20:35

## 2021-02-15 RX ADMIN — CEFEPIME 2000 MG: 2 INJECTION, POWDER, FOR SOLUTION INTRAMUSCULAR; INTRAVENOUS at 17:41

## 2021-02-15 RX ADMIN — ACETAMINOPHEN 650 MG: 325 TABLET, FILM COATED ORAL at 06:10

## 2021-02-15 RX ADMIN — ASPIRIN 81 MG: 81 TABLET, COATED ORAL at 08:39

## 2021-02-15 RX ADMIN — HYDROCHLOROTHIAZIDE 25 MG: 25 TABLET ORAL at 08:39

## 2021-02-15 ASSESSMENT — PAIN SCALES - GENERAL: PAINLEVEL_OUTOF10: 0

## 2021-02-15 ASSESSMENT — PAIN DESCRIPTION - DESCRIPTORS: DESCRIPTORS: DULL;STABBING

## 2021-02-15 ASSESSMENT — PAIN DESCRIPTION - FREQUENCY: FREQUENCY: CONTINUOUS

## 2021-02-15 ASSESSMENT — LIFESTYLE VARIABLES: SMOKING_STATUS: 0

## 2021-02-15 ASSESSMENT — PAIN DESCRIPTION - PROGRESSION: CLINICAL_PROGRESSION: NOT CHANGED

## 2021-02-15 NOTE — PROGRESS NOTES
Pharmacy Consultation Note  (Antibiotic Dosing and Monitoring)    Initial consult date: 02/15/21  Consulting physician: Selena  Drug: Vancomycin  Indication: Skin and Soft Tissue Infection     Age/  Gender Height Weight IBW Dosing weight  Allergy Information   60 y.o./male 5' 11\" (180.3 cm) 181 lb (82.1 kg)     Ideal body weight: 75.3 kg (166 lb 0.1 oz)  Adjusted ideal body weight: 78.2 kg (172 lb 6.5 oz)  82.6  Pcn [penicillins]      Temp (24hrs), Av.6 °F (37 °C), Min:97.6 °F (36.4 °C), Max:99.6 °F (37.6 °C)          Date  WBC BUN SCr CrCl  (mL/min) Drug/Dose Time   Given Level(s)   (Time) Comments   21 8 30 0.8  103   Vancomycin 1250 mg IV  2142                                          No intake or output data in the 24 hours ending 02/15/21 0157    Historical Cultures:  No results found for: ORG  No results for input(s): BC in the last 72 hours. Cultures:  available culture and sensitivity results were reviewed in [facility]    Assessment:  · 63 y.o. male has been initiated Vancomycin.   · Estimated CrCl = 103 mL/min  · Goal trough level = 15-20 mcg/mL    Plan:  · Will initiate vancomycin at a dose of 1250 mg  · Monitor renal function   · Clinical pharmacy to follow      ALLISON JOSHUA [facility], [facility] 2/15/2021 1:57 AM

## 2021-02-15 NOTE — CONSULTS
[address]                                  CONSULTATION    PATIENT NAME: Blane Esqueda                   :        1960  MED REC NO:   [id number]                            ROOM:       7972  ACCOUNT NO:   [de-identified]                           ADMIT DATE: 2021  PROVIDER:     Kristofer Mendez DPM    CONSULT DATE:  02/15/2021    CONSULTANT:  Kristofer Mendez DPM    ATTENDING PHYSICIAN:  Janie Walters.    CHIEF COMPLAINT:  Necrotic left heel ulceration. HISTORY OF PRESENT ILLNESS:  This is a pleasant 69-year-old male  presenting with left heel ulceration and infection. The patient  reportedly notes that he has had worsening of the wounds over the last  few weeks. Thus, have a history of left-sided hemiparalysis secondary  to stroke. The patient noted that he knows that the wounds have  worsened especially the left side. I was consulted for the evaluation  and management of his wounds for possible I and D. The patient was  consulted for the aforementioned. PAST MEDICAL HISTORY:  Significant for insulin-dependent diabetes  mellitus with neuropathy, cerebral arterial occlusion with cerebral  infarction, hemiparalysis _____ left side, and hypertension. PAST SURGICAL HISTORY:  Finger amputation, knee arthroscopy,  tonsillectomy, and upper GI endoscopy. CURRENT MEDICATIONS:  Per med rec. ALLERGIES:  PENICILLIN. SOCIAL HISTORY:  The patient reports that he quit smoking. Denies any  alcohol or illicit drug use at this time. However, he does report he  drinks alcohol. FAMILY HISTORY:  Not on file. PHYSICAL EXAMINATION:  VITAL SIGNS:  Temperature is 36.8, pulse is 82, blood pressure is  140/73. FOCUSED LOWER EXTREMITY EXAMINATION:  NEUROLOGICAL EXAMINATION:   Protective sensation absent to bilateral lower extremities. Epicritic  sensation is intact.

## 2021-02-15 NOTE — PROGRESS NOTES
Room #:   5887/6231-62    Date: 2/15/2021       Patient Name: Jesus Manuel Stone  : 1960      MRN: [id number]     Patient unavailable for physical therapy treatment due to off floor at [address] [place] [address]

## 2021-02-15 NOTE — ANESTHESIA PRE PROCEDURE
Department of Anesthesiology  Preprocedure Note       Name:  Wilton Rose   Age:  63 y.o.  :  1960                                          MRN:  [id number]         Date:  2/15/2021      Surgeon: Esperanza Thibodeaux):  Rober Duncan DPM    Procedure: Procedure(s):  LEFT FOOT DEBRIDEMENT WITH BONE BIOPSY, WOUND VAC APPLICATION    Medications prior to admission:   Prior to Admission medications    Medication Sig Start Date End Date Taking?  Authorizing Provider   vitamin D (ERGOCALCIFEROL) 1.25 MG (32938 UT) CAPS capsule Take 1 capsule by mouth once a week 21   Saman Pacheco MD   ipratropium-albuterol (DUONEB) 0.5-2.5 (3) MG/3ML SOLN nebulizer solution Inhale 3 mLs into the lungs every 4 hours 21   Saman Pacheco MD   pantoprazole (PROTONIX) 40 MG tablet Take 1 tablet by mouth every morning (before breakfast) 21   Saman Pacheco MD   melatonin 3 MG TABS tablet Take 9 mg by mouth nightly as needed    Historical Provider, MD   aspirin 81 MG EC tablet Take 81 mg by mouth daily    Historical Provider, MD   hydroCHLOROthiazide (HYDRODIURIL) 25 MG tablet Take 25 mg by mouth daily    Historical Provider, MD   glimepiride (AMARYL) 2 MG tablet Take 1 tablet by mouth daily (with breakfast) 11/16/15   Alvin Siegel DO   insulin lispro (HUMALOG) 100 UNIT/ML injection vial Inject 0-12 Units into the skin 3 times daily (with meals) 11/16/15   Alvin Siegel DO   insulin lispro (HUMALOG) 100 UNIT/ML injection vial Inject 0-6 Units into the skin nightly 11/16/15   Alvin Siegel DO   metFORMIN (GLUCOPHAGE) 500 MG tablet Take 1 tablet by mouth 2 times daily (with meals) 11/16/15   Alvin Siegel DO   atorvastatin (LIPITOR) 40 MG tablet Take 1 tablet by mouth nightly 11/16/15   Alvin Siegel DO   clopidogrel (PLAVIX) 75 MG tablet Take 1 tablet by mouth daily 11/16/15   Alvin Siegel DO   vitamin B-1 100 MG tablet Take 1 tablet by mouth daily 11/16/15   Alvin Siegel DO       Current medications:  0.9 % sodium chloride infusion   Intravenous Continuous Barbara Pringle  mL/hr at 02/15/21 1155 New Bag at 02/15/21 1155    cefepime (MAXIPIME) 2000 mg IVPB extended (mini-bag)  2,000 mg Intravenous Q12H Gage Browning MD        0.9 % sodium chloride infusion   Intravenous Q12H Gage Browning MD        insulin lispro (HUMALOG) injection vial 0-6 Units  0-6 Units Subcutaneous TID WC Barbara Pringle MD        insulin lispro (HUMALOG) injection vial 0-3 Units  0-3 Units Subcutaneous Nightly Barbara Pringle MD        glucose (GLUTOSE) 40 % oral gel 15 g  15 g Oral PRN Barbara Pringle MD        dextrose 50 % IV solution  12.5 g Intravenous PRN Barbara Pringle MD        glucagon (rDNA) injection 1 mg  1 mg Intramuscular PRN Barbara Pringle MD        dextrose 5 % solution  100 mL/hr Intravenous PRN Barbara Pringle MD           Allergies:     Allergies   Allergen Reactions    Pcn [Penicillins]        Problem List:    Patient Active Problem List   Diagnosis Code    CVA (cerebral vascular accident) ([facility] [address] I63.9    Diabetes mellitus ([facility] [address] E11.9    Hyponatremia E87.1    Diabetic foot infection ([address] E11.628, L08.9    Hypertension I10    Hemiparesis affecting left side as late effect of cerebrovascular accident [facility]) I69.354    UTI (urinary tract infection) N39.0       Past Medical History:        Diagnosis Date    Cerebral artery occlusion with cerebral infarction ([facility] [address]     Diabetes mellitus ([facility] [address]     Type 2    Hemiparesis affecting left side as late effect of cerebrovascular accident ([facility] [address]     LEFT SIDE NON DOMINANT FOLLOWING STROKE    Hypertension        Past Surgical History:        Procedure Laterality Date    FINGER AMPUTATION      KNEE ARTHROSCOPY      TONSILLECTOMY      UPPER GASTROINTESTINAL ENDOSCOPY N/A 1/4/2021    EGD BIOPSY performed by Soraida Calix DO at [address] History:    Social History     Tobacco Use    Smoking status: Former Smoker  Smokeless tobacco: Never Used   Substance Use Topics    Alcohol use: Yes     Frequency: 4 or more times a week     Drinks per session: 5 or 6     Binge frequency: Daily or almost daily     Comment: every day drinker for most of adult life                                Counseling given: Not Answered      Vital Signs (Current): There were no vitals filed for this visit. BP Readings from Last 3 Encounters:   02/15/21 136/70   01/05/21 129/69   01/04/21 138/73       NPO Status:                           GREATER THAN 8 HOURS                                                      BMI:   Wt Readings from Last 3 Encounters:   02/15/21 182 lb (82.6 kg)   12/26/20 208 lb 1.6 oz (94.4 kg)   06/18/20 194 lb (88 kg)     There is no height or weight on file to calculate BMI.    CBC:   Lab Results   Component Value Date    WBC 8.0 02/14/2021    RBC 3.72 02/14/2021    HGB 9.6 02/14/2021    HCT 30.7 02/14/2021    MCV 82.5 02/14/2021    RDW 15.3 02/14/2021     02/14/2021       CMP:   Lab Results   Component Value Date     02/14/2021    K 4.4 02/14/2021    K 3.9 01/01/2021    CL 96 02/14/2021    CO2 20 02/14/2021    BUN 30 02/14/2021    CREATININE 0.8 02/14/2021    GFRAA >60 02/14/2021    LABGLOM >60 02/14/2021    GLUCOSE 257 02/14/2021    PROT 7.8 02/14/2021    CALCIUM 9.3 02/14/2021    BILITOT 0.6 02/14/2021    ALKPHOS 134 02/14/2021    AST 32 02/14/2021    ALT 22 02/14/2021       POC Tests: No results for input(s): POCGLU, POCNA, POCK, POCCL, POCBUN, POCHEMO, POCHCT in the last 72 hours.     Coags:   Lab Results   Component Value Date    PROTIME 21.8 01/01/2021    INR 1.9 01/01/2021    APTT 34.1 11/15/2015       HCG (If Applicable): No results found for: PREGTESTUR, PREGSERUM, HCG, HCGQUANT     ABGs: No results found for: PHART, PO2ART, VKP5SPT, HUT4POW, BEART, X5XOFIKV     Type & Screen (If Applicable):  No results found for: [place] Drug/Infectious Status (If Applicable):  No results found for: HIV, HEPCAB    COVID-19 Screening (If Applicable):   Lab Results   Component Value Date    COVID19 Not Detected 01/04/2021         Anesthesia Evaluation  Patient summary reviewed  Airway: Mallampati: III  TM distance: <3 FB   Neck ROM: full  Mouth opening: < 3 FB Dental: normal exam     Comment: Dentition intact, missing a couple of molars. Patient denies any loose teeth. Pulmonary: breath sounds clear to auscultation      (-) not a current smoker                          ROS comment: Former Smoker. 1 PPD x 5 years quit at age 20. Cardiovascular:  Exercise tolerance: poor (<4 METS),   (+) hypertension:, hyperlipidemia    Murmur:  Grade 1/6 murmur. ECG reviewed  Rhythm: regular    Echocardiogram reviewed               ROS comment: EKG: Normal Sinus Rhythm 92, NS St & T changes. ECHO:   No previous echo for comparison. Technically adequate study. Mild concentric left ventricular hypertrophy   Ejection fraction is visually estimated at 55%. No regional wall motion abnormalities seen. E/A flow reversal noted. Suggestive of diastolic dysfunction. Mild calcification of the posterior leaflet of the mitral valve. Physiologic and/or trace mitral regurgitation is present. Physiologic and/or trace tricuspid regurgitation. Neuro/Psych:   (+) CVA (left hemiparesis): residual symptoms,             GI/Hepatic/Renal: Neg GI/Hepatic/Renal ROS  (+) GERD:,           Endo/Other:    (+) DiabetesType II DM, using insulin, . ROS comment: Diabetic foot infection  Abdominal:         (-) obese     Vascular: negative vascular ROS. Anesthesia Plan      MAC     ASA 3       Induction: intravenous. Anesthetic plan and risks discussed with patient. Plan discussed with CRNA.               PAT Chart Review: Chart reviewed per routine by Pavel Arango MD.  Above represents information available via shared medical record including previous anesthesia history, drug and allergy history. Confirmation of above and final plan per Day of Surgery (DOS) anesthesiologist.    Pavel Arango MD   2/15/2021    Patient seen, interviewed, examined and changes made to the pre-op note as needed. Patient is ready for procedure.     Mandi Kaiser MD  2-16-21

## 2021-02-15 NOTE — PROGRESS NOTES
Comprehensive Nutrition Assessment    Type and Reason for Visit:  Initial, Positive Nutrition Screen    Nutrition Recommendations/Plan: Will start Ensure HP BID and Dallin BID to promote wound healing. Nutrition Assessment:  Pt admit w/ bilateral heel wounds, PMH of CVA and DM. Noted nectrosis of L heel, pending OR for debridement and bone biopsy. Will start ONS to promote wound healing and monitor. Malnutrition Assessment:  Malnutrition Status:  Insufficient data    Context:  Acute Illness     Findings of the 6 clinical characteristics of malnutrition:  Energy Intake:  No significant decrease in energy intake(pt denied any recent change in intake)  Weight Loss:  Unable to assess(d/t edema noted w/ only actual recent EMR wt hx)     Body Fat Loss:  Unable to assess(pt out of room despite multiple attempts)     Muscle Mass Loss:  Unable to assess    Fluid Accumulation:  No significant fluid accumulation     Strength:  Not Performed    Estimated Daily Nutrient Needs:  Energy (kcal):  0814-6765; Weight Used for Energy Requirements:  Current     Protein (g):  105-120(1.3-1.5 gm/kg IBW); Weight Used for Protein Requirements:  Ideal        Fluid (ml/day):  3232-9201; Method Used for Fluid Requirements:  1 ml/kcal      Nutrition Related Findings:  Pt A/Ox4, abd WDL, +BS, I/O WNL, no edema, hyponatremia improving since admission, variable BG, L sided hemiparesis      Wounds:  Multiple(bilateral heel wounds noted, L heel noted to be necrotic)       Current Nutrition Therapies:    DIET GENERAL;  Diet NPO, After Midnight    Anthropometric Measures:  · Height: 5' 11\" (180.3 cm)  · Current Body Weight: 182 lb (82.6 kg)(2/15 no method)   · Admission Body Weight: 181 lb (82.1 kg)(2/14 no method)    · Usual Body Weight: 208 lb (94.3 kg)(12/26/20 bed scale, edema noted per EMR.  Limited actual EMR wt hx)     · Ideal Body Weight: 172 lbs; % Ideal Body Weight 105.8 %   · BMI: 25.4  · Adjusted Body Weight: No Adjustment · BMI Categories: Overweight (BMI 25.0-29. 9)       Nutrition Diagnosis:   · Inadequate oral intake related to increase demand for energy/nutrients as evidenced by wounds    Nutrition Interventions:   Food and/or Nutrient Delivery:  Continue Current Diet, Start Oral Nutrition Supplement(Will start Ensure HP BID and Dallin BID to promote wound healing.)  Nutrition Education/Counseling:  No recommendation at this time   Coordination of Nutrition Care:  Continue to monitor while inpatient    Goals:  Pt is to consume >75% of most meals/ONS       Nutrition Monitoring and Evaluation:   Behavioral-Environmental Outcomes:  None Identified   Food/Nutrient Intake Outcomes:  Food and Nutrient Intake, Supplement Intake  Physical Signs/Symptoms Outcomes:  Biochemical Data, GI Status, Fluid Status or Edema, Nutrition Focused Physical Findings, Skin, Weight     Discharge Planning:    Continue Oral Nutrition Supplement     Electronically signed by Ray Hall RD, LD on 2/15/21 at 3:51 PM EST    Contact: [phone]

## 2021-02-15 NOTE — CARE COORDINATION
2-15-Cm note: ( no Covid testing) I met with pt for transition of care needs, pt is a bedhold at [facility], pt plans on returning there at OR, pt will require precert if he is here for 3 days as they will take him back skilled. NO precert if less than 3 days, also will require Covid test if here more than 3 days. CM/SS will follow for for needs.  Electronically signed by Presley Beckett RN on 2/15/2021 at 2:28 PM

## 2021-02-15 NOTE — PROGRESS NOTES
Physical Therapy Initial Evaluation    Room #:  0336/0336-02  Patient Name: Elba Degroot  YOB: 1960  MRN: [id number]    Referring Provider:   Diann Mcgregor DPM     Date of Service: 2/16/2021    Evaluating Physical Therapist: Starr Overton, PT #[id number]       Diagnosis:   Diabetic foot infection ([address] [E11.628, L08.9]    bilateral heel wounds     Patient Active Problem List   Diagnosis    CVA (cerebral vascular accident) ([address]    Diabetes mellitus ([address]    Hyponatremia    Diabetic foot infection ([address]    Hypertension    Hemiparesis affecting left side as late effect of cerebrovascular accident ([address]    UTI (urinary tract infection)    Staphylococcus aureus bacteremia        Tentative placement recommendation: Subacute rehab    Equipment recommendation:  To be determined      Prior Level of Function: Patient ambulated independently and with hemicane at therapy otherwise in wheelchair    Rehab Potential: fair    for baseline    Past medical history:   Past Medical History:   Diagnosis Date    Cerebral artery occlusion with cerebral infarction ([address]     Diabetes mellitus ([address]     Type 2    Hemiparesis affecting left side as late effect of cerebrovascular accident ([address]     LEFT SIDE NON DOMINANT FOLLOWING STROKE    Hypertension      Past Surgical History:   Procedure Laterality Date    FINGER AMPUTATION      KNEE ARTHROSCOPY      TONSILLECTOMY      UPPER GASTROINTESTINAL ENDOSCOPY N/A 1/4/2021    EGD BIOPSY performed by Sal Caldwell DO at [facility] ENDOSCOPY       Precautions: Bedrest with bathroom Privileges , falls and alarm ,  history of cva with redisual Left upper extremitiy hemiparesis non weight bearing Left lower extremity  Wound vac  S/p LEFT FOOT DEBRIDEMENT WITH BONE BIOPSY, WOUND VAC APPLICATION  SUBJECTIVE:    Social history: Patient lives in a skilled nursing facility reports has been there ~ 3 weeks Prior lives with mother wheelchair  Bound has nurse comes in to help and brother 57 yo has MS bedbound in basement has caregiver  in a ranch home  with 3 steps  to enter to kitchen with [address] 3 steps to bed/bath, to basement 12 steps with rail    Equipment owned: Wheelchair, Cane, Wheeled Walker, Bedside commode and Shower chair,       [id number] [facility] Mobility Inpatient   How much difficulty turning over in bed?: A Little  How much difficulty sitting down on / standing up from a chair with arms?: Unable  How much difficulty moving from lying on back to sitting on side of bed?: A Little  How much help from another person moving to and from a bed to a chair?: Total  How much help from another person needed to walk in hospital room?: Total  How much help from another person for climbing 3-5 steps with a railing?: Total  AM-PAC Inpatient Mobility Raw Score : 10  AM-PAC Inpatient T-Scale Score : 32.29  Mobility Inpatient CMS 0-100% Score: 76.75  Mobility Inpatient CMS G-Code Modifier : CL    Nursing cleared patient for PT evaluation. The admitting diagnosis and active problem list as listed above have been reviewed prior to the initiation of this evaluation. OBJECTIVE;   Initial Evaluation  Date: 2/16/2021 Treatment Date:     Short Term/ Long Term   Goals   Was pt agreeable to Eval/treatment? Yes    To be met in 3 days   Pain level   4/10  Left foot     Bed Mobility    Rolling: Supervision     Supine to sit: Minimal assist of 1    Sit to supine: Minimal assist of 1    Scooting: Minimal assist of 1    Rolling: Independent    Supine to sit: Independent    Sit to supine: Independent    Scooting: Independent     Transfers Sit to stand: attempted, patient unable to maintain non weight bearing  Left lower extremity  Will require assist of 2  Sit to stand:  Moderate assist of 1 non weight bearing Left lower extremity Ambulation    not assessed     3 feet using  hemiwalker with Moderate assist of 1 non weight bearing Left lower extremity    Stair negotiation: ascended and descended   Not assessed            ROM Within functional limits except Left upper extremitiy due to history cva and Left lower extremity wound vac left foot limited active knee extension due weakness     Increase range of motion 10% of affected joints    Strength BUE:  refer to OT juliaal  RLE:  3+/5  LLE:  not assessed    Increase strength in affected mm groups by 1/3 grade   Balance Sitting EOB:  good -  Dynamic Standing:  not assessed    Sitting EOB:  good    Dynamic Standing: fair hemiwalker     Patient is Alert & Oriented x person, place, time and situation and follows directions    Sensation:  Patient  reports numbness and tingling left lower extremity    Edema:  yes left lower extremity    Endurance: fair  +    Vitals: room air    Blood Pressure at rest   Blood Pressure during session     Heart Rate at rest   Heart Rate during session     SPO2 at rest  %  SPO2 during session  %     Patient education  Patient educated on role of Physical Therapy, risks of immobility, safety and plan of care,  importance of mobility while in hospital , weight bearing status  and positioning for skin integrity and comfort non weight bearing Left lower extremity     Patient response to education:   Pt verbalized understanding Pt demonstrated skill Pt requires further education in this area   Yes Partial Yes      Treatment:  Patient practiced and was instructed/facilitated in the following treatment: Patient assisted to Edge of bed  Sat edge of bed 15 minutes with Supervision  to increase dynamic sitting balance and activity tolerance. Performed exercises. Returned to bed, Maximal assist of  2 to scoot to Head of bed in trendelenburg       Therapeutic Exercises:  ankle pumps and long arc quad  x  10 reps.

## 2021-02-15 NOTE — CONSULTS
Consult dictated  Assessment:  1) Left heel wound - POA, infected. Plan for OR debridement  2) ? Acute OM - Plan for biopsy tomorrow  3) Left foot cellulitis - for debridement tomorrow  4) PAD - vascular studies ordered  5) IDDM with neuropathy  6) Right heel wound - Stable, POA, not infected    Plan:  NPO after midnight  OR debridement and biopsy tomorrow  Thank you for consulting my service.     Raad Go DPM FACFAS  Fellowship-Trained Foot and Ankle Surgeon  Diplomate, American Board of Foot and Ankle Surgeons  [phone]

## 2021-02-15 NOTE — H&P
[address] [place], [place] [address]ist Group   HISTORY AND PHYSICAL EXAM      AUTHOR: Tiffanie Moreau PATIENT NAME: Nahed Phoenix   DATE: February 15, 2021 MRN: [id number], : 1960   Primary Care Physician: Denise Liz MD     CHIEF COMPLAINT / REASON FOR ADMISSION:  Fever and feet wound    HPI:   This is a 63 y.o. male  has a past medical history of Cerebral artery occlusion with cerebral infarction ([address] Diabetes mellitus ([address] Hemiparesis affecting left side as late effect of cerebrovascular accident ([address] and Hypertension. presented with fever, and heel pain and wound for last few days prior to arrival to the hospital.  For last 3 weeks he has had bilateral heel wounds that have been dressed and seen by infectious disease at the nursing home. Not on any antibiotics. Has pain of his heels but states that is not any worse than it was before. No recent trauma or falls. He does have a previous history of CVA with residual left-sided weakness. The patient was seen and examined at bedside, appears alert and awake with no acute distress and is able to answer simple  questions. On direct questioning, patient denied any  resting ongoing chest pain, resting SOB, hemoptysis, productive cough, fever, ongoing palpitation, active abdominal pain, hematemesis, rectal bleeding, wally, hematuria, any other  and GI complaints and any new focal neuro deficits.     ROS:  Pertinent positives and negatives are noted in the HPI, all other systems are reviewed and negative    PMH:  Past Medical History:   Diagnosis Date    Cerebral artery occlusion with cerebral infarction ([address]     Diabetes mellitus ([address]     Type 2    Hemiparesis affecting left side as late effect of cerebrovascular accident ([address]     LEFT SIDE NON DOMINANT FOLLOWING STROKE    Hypertension        Surgical History:  Past Surgical History:   Procedure Laterality Date    FINGER AMPUTATION      KNEE ARTHROSCOPY      TONSILLECTOMY      UPPER GASTROINTESTINAL ENDOSCOPY N/A 1/4/2021    EGD BIOPSY performed by Karina Rees DO at [place] [id number]       Medications Prior to Admission:    Prior to Admission medications    Medication Sig Start Date End Date Taking? Authorizing Provider   vitamin D (ERGOCALCIFEROL) 1.25 MG (93154 UT) CAPS capsule Take 1 capsule by mouth once a week 1/12/21  Yes Carlos Meraz MD   ipratropium-albuterol (DUONEB) 0.5-2.5 (3) MG/3ML SOLN nebulizer solution Inhale 3 mLs into the lungs every 4 hours 1/5/21  Yes Carlos Meraz MD   pantoprazole (PROTONIX) 40 MG tablet Take 1 tablet by mouth every morning (before breakfast) 1/6/21  Yes Carlos Meraz MD   melatonin 3 MG TABS tablet Take 9 mg by mouth nightly as needed   Yes Historical Provider, MD   aspirin 81 MG EC tablet Take 81 mg by mouth daily   Yes Historical Provider, MD   hydroCHLOROthiazide (HYDRODIURIL) 25 MG tablet Take 25 mg by mouth daily   Yes Historical Provider, MD   glimepiride (AMARYL) 2 MG tablet Take 1 tablet by mouth daily (with breakfast) 11/16/15  Yes Esaw Griffin, DO   insulin lispro (HUMALOG) 100 UNIT/ML injection vial Inject 0-12 Units into the skin 3 times daily (with meals) 11/16/15  Yes Esaw Griffin, DO   insulin lispro (HUMALOG) 100 UNIT/ML injection vial Inject 0-6 Units into the skin nightly 11/16/15  Yes Esaw Griffin, DO   metFORMIN (GLUCOPHAGE) 500 MG tablet Take 1 tablet by mouth 2 times daily (with meals) 11/16/15  Yes Esaw Griffin, DO   atorvastatin (LIPITOR) 40 MG tablet Take 1 tablet by mouth nightly 11/16/15  Yes Esaw Griffin, DO   clopidogrel (PLAVIX) 75 MG tablet Take 1 tablet by mouth daily 11/16/15  Yes Esaw Griffin, DO   vitamin B-1 100 MG tablet Take 1 tablet by mouth daily 11/16/15  Yes Esaw Griffin, DO       Allergies:    Pcn [penicillins]    Social History:    reports that he has quit smoking. He has never used smokeless tobacco. He reports current alcohol use. He reports that he does not use drugs. Family History:   family history is not on file. PHYSICAL EXAM:  Vitals:  BP (!) 140/73   Pulse 82   Temp 98.2 °F (36.8 °C) (Oral)   Resp 18   Ht 5' 11\" (1.803 m)   Wt 182 lb (82.6 kg)   SpO2 96%   BMI 25.38 kg/m²   GENERAL: No acute distress, Alert and awake, Afebrile, Appears tired and weak otherwise hemodynamically stable at present. HEENT: PERRLA, no icterus. OP clear and no exudates. NECK: Supple  no carotid/ophthalmic bruits, JVD None. RESPIRATORY:  Bilateral equal vesicular breath sound with no wheezing. Lung bases are clear. HEART: No tachycardia at bedside and regular rhythm. Normal S1 and S2, No S3 or S4 is audible. No pulsation, thrills, murmur or friction rubs. ABDOMEN: Soft, nondistended, nontender. No hepatomegaly or splenomegaly. No CVA tenderness on the both sides. Bowel sound is present. EXTREMITIES: All peripheral pulses are present. No calf tenderness or swelling. The wound of the right heel 1.5cm x 1.5cm wound - no active dischage. The left heel : 4 cm x 4 cm with purulent drainage and foul odor. NEUROLOGY: Alert and awake. Have some residual left upper extremity weakness from previous CVA. There is some mild left lower extremity weakness but he is still able to lift his leg. LABS:  Recent Labs     02/14/21 1907   WBC 8.0   RBC 3.72*   HGB 9.6*   HCT 30.7*   MCV 82.5   MCH 25.8*   MCHC 31.3*   RDW 15.3*      MPV 9.6     Recent Labs     02/14/21 1907   *   K 4.4   CL 96*   CO2 20*   BUN 30*   CREATININE 0.8   GLUCOSE 257*   CALCIUM 9.3     No results for input(s): POCGLU in the last 72 hours.   Results for orders placed or performed during the hospital encounter of 02/14/21   CBC Auto Differential   Result Value Ref Range    WBC 8.0 4.5 - 11.5 E9/L    RBC 3.72 (L) 3.80 - 5.80 E12/L    Hemoglobin 9.6 (L) 12.5 - 16.5 g/dL    Hematocrit 30.7 (L) 37.0 - 54.0 %    MCV 82.5 80.0 - 99.9 fL    MCH 25.8 (L) 26.0 - 35.0 pg    MCHC 31.3 (L) 32.0 - 34.5 % RDW 15.3 (H) 11.5 - 15.0 fL    Platelets 423 411 - 840 E9/L    MPV 9.6 7.0 - 12.0 fL    Neutrophils % 78.6 43.0 - 80.0 %    Immature Granulocytes % 0.5 0.0 - 5.0 %    Lymphocytes % 13.4 (L) 20.0 - 42.0 %    Monocytes % 6.8 2.0 - 12.0 %    Eosinophils % 0.2 0.0 - 6.0 %    Basophils % 0.5 0.0 - 2.0 %    Neutrophils Absolute 6.30 1.80 - 7.30 E9/L    Immature Granulocytes # 0.04 E9/L    Lymphocytes Absolute 1.08 (L) 1.50 - 4.00 E9/L    Monocytes Absolute 0.55 0.10 - 0.95 E9/L    Eosinophils Absolute 0.02 (L) 0.05 - 0.50 E9/L    Basophils Absolute 0.04 0.00 - 0.20 E9/L   Troponin   Result Value Ref Range    Troponin <0.01 0.00 - 0.03 ng/mL   Sedimentation Rate   Result Value Ref Range    Sed Rate 87 (H) 0 - 15 mm/Hr   Urinalysis   Result Value Ref Range    Color, UA Yellow Straw/Yellow    Clarity, UA CLOUDY (A) Clear    Glucose, Ur 500 (A) Negative mg/dL    Bilirubin Urine Negative Negative    Ketones, Urine Negative Negative mg/dL    Specific Gravity, UA 1.015 1.005 - 1.030    Blood, Urine TRACE (A) Negative    pH, UA >=9.0 5.0 - 9.0    Protein,  (A) Negative mg/dL    Urobilinogen, Urine 2.0 (A) <2.0 E.U./dL    Nitrite, Urine Negative Negative    Leukocyte Esterase, Urine MODERATE (A) Negative   Beta-Hydroxybutyrate   Result Value Ref Range    Beta-Hydroxybutyrate 0.21 0.02 - 0.27 mmol/L   pH, venous   Result Value Ref Range    pH, Pk 7.42 7.35 - 7.45   Comprehensive metabolic panel   Result Value Ref Range    Sodium 130 (L) 132 - 146 mmol/L    Potassium 4.4 3.5 - 5.0 mmol/L    Chloride 96 (L) 98 - 107 mmol/L    CO2 20 (L) 22 - 29 mmol/L    Anion Gap 14 7 - 16 mmol/L    Glucose 257 (H) 74 - 99 mg/dL    BUN 30 (H) 8 - 23 mg/dL    CREATININE 0.8 0.7 - 1.2 mg/dL    GFR Non-African American >60 >=60 mL/min/1.73    GFR African American >60     Calcium 9.3 8.6 - 10.2 mg/dL    Total Protein 7.8 6.4 - 8.3 g/dL    Albumin 3.7 3.5 - 5.2 g/dL    Total Bilirubin 0.6 0.0 - 1.2 mg/dL    Alkaline Phosphatase 134 (H) 40 - 129 U/L    ALT 22 0 - 40 U/L    AST 32 0 - 39 U/L   Microscopic Urinalysis   Result Value Ref Range    WBC, UA PACKED (A) 0 - 5 /HPF    RBC, UA 1-3 0 - 2 /HPF    Bacteria, UA MANY (A) None Seen /HPF   POCT Glucose   Result Value Ref Range    Meter Glucose 160 (H) 74 - 99 mg/dL   EKG 12 Lead   Result Value Ref Range    Ventricular Rate 110 BPM    Atrial Rate 110 BPM    P-R Interval 146 ms    QRS Duration 76 ms    Q-T Interval 348 ms    QTc Calculation (Bazett) 470 ms    P Axis 6 degrees    R Axis -13 degrees    T Axis 3 degrees     ED Course as of Feb 15 0557   Angelica Estevez Feb 14, 2021 2028 Patient was updated on his results. He is agreeable for admission. Patient again does not recall what podiatrist did a debridement on his heel wounds in the past.    [MS]      ED Course User Index  [MS] Ingrid Moore DO     Radiology: Xr Foot Left (2 Views)    Result Date: 2/14/2021  EXAMINATION: TWO XRAY VIEWS OF THE LEFT FOOT 2/14/2021 7:38 pm COMPARISON: None. HISTORY: ORDERING SYSTEM PROVIDED HISTORY: infected wound; r/o osteo TECHNOLOGIST PROVIDED HISTORY: Reason for exam:->infected wound; r/o osteo FINDINGS: There is no evidence of acute fracture. Possible remote fracture involving the 5th metatarsal neck. There is normal alignment of the tarsometatarsal joints. No acute joint abnormality. No focal osseous lesion. The osseous structures are osteopenic. No focal soft tissue abnormality. No acute osseous abnormality. No definitive evidence for osteomyelitis. The osseous structures are osteopenic. Xr Chest Portable    Result Date: 2/14/2021  EXAMINATION: ONE XRAY VIEW OF THE CHEST 2/14/2021 8:07 pm COMPARISON: December 30, 2020 HISTORY: ORDERING SYSTEM PROVIDED HISTORY: sepsis TECHNOLOGIST PROVIDED HISTORY: Reason for exam:->sepsis FINDINGS: No airspace opacity or pleural effusion. The heart is normal size. No pneumothorax. No free air beneath the hemidiaphragms. Multiple chronic left-sided rib fractures.     No Reasonable efforts were made to correct any dictation errors that resulted due to the programming of this software but some may still be present.

## 2021-02-15 NOTE — PROGRESS NOTES
Pharmacy Consultation Note  (Antibiotic Dosing and Monitoring)    Initial consult date: 2/15/2021  Consulting physician: Dr. Gerard Bonner  Drug(s): Vancomycin  Indication: SSTI    Ht Readings from Last 1 Encounters:   02/15/21 5' 11\" (1.803 m)     Wt Readings from Last 1 Encounters:   02/15/21 182 lb (82.6 kg)     Age/  Gender IBW DW  Allergy Information   63 y.o.   male    Pcn [penicillins]          Date  Tmax WBC BUN/CR UOP  (mL/kg/hr) Drug/Dose Time   Given Level(s)   (Time) Comments   2/14  (#1) 99.6 8 30/0.8 -- Vancomycin 1250 mg IV x 1 2142     2/15  (#2) afebrile -- -- -- Vancomycin 1250 mg IV q12hr 1154       (#3)             (#4)             (#5)             (#6)             (#7)             Estimated Creatinine Clearance: 103 mL/min (based on SCr of 0.8 mg/dL). UOP over the past 24 hours:       Intake/Output Summary (Last 24 hours) at 2/15/2021 1307  Last data filed at 2/15/2021 1217  Gross per 24 hour   Intake 640 ml   Output    Net 640 ml     Other anti-infectives: Anti-infective Dose Date Initiated Date Stopped   Cefepime 2g IV q12hr 2/14      Cultures:  available culture and sensitivity results were reviewed in EPIC  Cultures sent and are pending. Culture Date Result    Blood cx 1 2/14    Blood cx 2 2/14    Urine cx 2/14    Wound cx 2/14           Assessment:  · Consulted by Dr. Gerard Bonner to dose/monitor vancomycin  · Goal trough level:  15-20 mcg/mL  · Pt is a 64 y/o male who presented from home diabetic foot infection and cellulitis.   · Serum creatinine yesterday: 0.8 mg/dL; CrCl ~ 103 mL/min; baseline Scr ~ 0.8 mg/dL    Plan:  · Vancomycin 1250 mg IV q12hr  · Level prior to 4th dose  · Follow renal function  · Pharmacist will follow and monitor/adjust dosing as necessary      Thank you for the consult,    Troy Peters, PharmD, BCPS 2/15/2021 1:07 PM   Ext: [phone]

## 2021-02-15 NOTE — PROGRESS NOTES
Pulmonary/Chest: clear to auscultation bilaterally- no wheezes, rales or rhonchi, normal air movement, no respiratory distress  Cardiovascular: normal rate, normal S1 and S2 and no carotid bruits  Abdomen: soft, non-tender, non-distended, normal bowel sounds, no masses or organomegaly  Extremities: Heels with wound dressings and foam pads in place. Some surrounding erythema. No cyanosis, no clubbing and no edema  Neurologic: no cranial nerve deficit and speech normal      Recent Labs     02/14/21 1907   *   K 4.4   CL 96*   CO2 20*   BUN 30*   CREATININE 0.8   GLUCOSE 257*   CALCIUM 9.3       Recent Labs     02/14/21 1907   ALKPHOS 134*   PROT 7.8   LABALBU 3.7   BILITOT 0.6   AST 32   ALT 22       Recent Labs     02/14/21 1907   WBC 8.0   RBC 3.72*   HGB 9.6*   HCT 30.7*   MCV 82.5   MCH 25.8*   MCHC 31.3*   RDW 15.3*      MPV 9.6       CBC:   Lab Results   Component Value Date    WBC 8.0 02/14/2021    RBC 3.72 02/14/2021    HGB 9.6 02/14/2021    HCT 30.7 02/14/2021    MCV 82.5 02/14/2021    MCH 25.8 02/14/2021    MCHC 31.3 02/14/2021    RDW 15.3 02/14/2021     02/14/2021    MPV 9.6 02/14/2021     BMP:    Lab Results   Component Value Date     02/14/2021    K 4.4 02/14/2021    K 3.9 01/01/2021    CL 96 02/14/2021    CO2 20 02/14/2021    BUN 30 02/14/2021    LABALBU 3.7 02/14/2021    CREATININE 0.8 02/14/2021    CALCIUM 9.3 02/14/2021    GFRAA >60 02/14/2021    LABGLOM >60 02/14/2021    GLUCOSE 257 02/14/2021        Radiology:   VL LOWER EXTREMITY ARTERIAL SEGMENTAL PRESSURES W PPG BILATERAL   Final Result   1. Significant pressure gradient at the left thigh level that could be   associated with significant stenosis. 2. Normal right ankle brachial index at 1.01. The left MAHOGANY is mildly   decreased at 0.93. Bilateral decreased toe brachial indices is 0.29 on the   right and 0.28 on the left.    3. Abnormal PVRs at the metatarsal level, greater on the left and digital level.  Findings could be related to significant distal small vessel disease. 4. Correlation with imaging such as doppler ultrasound or CT angiography   could be considered. XR FOOT LEFT (2 VIEWS)   Final Result   No acute osseous abnormality. No definitive evidence for osteomyelitis. The osseous structures are osteopenic. XR CHEST PORTABLE   Final Result   No pneumonia or pleural effusion. Assessment/Plan:  Active Problems:    Diabetic foot infection ([address]    Hypertension    Hemiparesis affecting left side as late effect of cerebrovascular accident [facility])    UTI (urinary tract infection)  Resolved Problems:    * No resolved hospital problems. *      1. Diabetic foot infection/bilateral lower extremity cellulitis  -continue vancomycin and cefepime  -Scheduled for OR debridement tomorrow per podiatry    2. Uncontrolled type 2 diabetes mellitus  -Start basal insulin and corrective scale while inpatient for goal blood glucose between 120 and 180    3. UTI  -on coverage with cefepime    4. History of stroke  -Continue aspirin and statin    DVT prophylaxis: Subcutaneous enoxaparin  Full code    NOTE: This report was transcribed using voice recognition software. Every effort was made to ensure accuracy; however, inadvertent computerized transcription errors may be present.      Electronically signed by Abagail Kocher, DO on 2/15/2021 at 6:22 PM

## 2021-02-15 NOTE — CONSULTS
[facility]  Consult Note    [address] [facility] [address]  [address], [address] [place] [address]  Phone [phone]   [phone]      Admit Date: 2021  6:32 PM  Pt Name: Tulio Sarmiento  MRN: [id number]  : 1960  Reason for Consult:    Chief Complaint   Patient presents with    Fever     102.2 at [facility] rehab NF, 101.7 for EMS    Wound Check     BILAT HEELS X FEW WEEKS     Requesting Physician:  Dat Pradhan MD  PCP: Cammie Gatica MD  History Obtained From:  patient, chart   ID consulted for Diabetic foot infection ([facility] [Z90.154, L08.9]  on hospital day 1  CHIEF COMPLAINT       Chief Complaint   Patient presents with    Fever     102.2 at [facility] rehab NF, 101.7 for EMS    Wound Check     BILAT HEELS X FEW WEEKS     HISTORYOF PRESENT ILLNESS      Tulio Sarmiento is a 63 y.o. male who presents with significant past medical history of  has a past medical history of Cerebral artery occlusion with cerebral infarction ([facility] [address] [facility] Diabetes mellitus ([facility] [address] [facility] Hemiparesis affecting left side as late effect of cerebrovascular accident ([facility] and Hypertension.    ED TRIAGEVITALS  BP: 136/70, Temp: 98.3 °F (36.8 °C), Pulse: 84, Resp: 16, SpO2: 96 %  HPI  PT FROM ECF WITH FEVERS TMAX 102 AND BLE HEEL WOUNDS  HE IS A POOR HISTORIAN/POOR RECALL  HE HAS H/O CVA WITH LEFT SIDE WEAKNESS  CURRENTLY AFEBRILE ON RA  WBC8 CR0.8  UA CLOUDY WBC  CXRY NAP   LEFT FOOT XRY NAP  REVIEW OF SYSTEMS    (2-9 systems for level 4, 10 or more for level 5)     REVIEW OFSYSTEMS:    CONSTITUTIONAL:   No fever, chills, weight loss  ALLERGIES:    No urticaria, hay fever,    EYES:     No blurry vision, loss of vision,eye pain  ENT:      No hearing loss, sore throat  CARDIOVASCULAR:  No chest pain or palpitations  RESPIRATORY:   No cough, sob  ENDOCRINE:    No increase thirst, urination   HEME-LYMPH:   No easy bruising or bleeding  GI:     No nausea, vomiting or diarrhea :     No urinary complaints  NEURO:    No seizures, stroke, HA  MUSCULOSKELETAL:  No muscle aches or pain, no jointpain  SKIN:     No rash or itch  PSYCH:    No depression or anxiety    Medications Prior to Admission: vitamin D (ERGOCALCIFEROL) 1.25 MG (91900 UT) CAPS capsule, Take 1 capsule by mouth once a week  ipratropium-albuterol (DUONEB) 0.5-2.5 (3) MG/3ML SOLN nebulizer solution, Inhale 3 mLs into the lungs every 4 hours  pantoprazole (PROTONIX) 40 MG tablet, Take 1 tablet by mouth every morning (before breakfast)  melatonin 3 MG TABS tablet, Take 9 mg by mouth nightly as needed  aspirin 81 MG EC tablet, Take 81 mg by mouth daily  hydroCHLOROthiazide (HYDRODIURIL) 25 MG tablet, Take 25 mg by mouth daily  glimepiride (AMARYL) 2 MG tablet, Take 1 tablet by mouth daily (with breakfast)  insulin lispro (HUMALOG) 100 UNIT/ML injection vial, Inject 0-12 Units into the skin 3 times daily (with meals)  insulin lispro (HUMALOG) 100 UNIT/ML injection vial, Inject 0-6 Units into the skin nightly  metFORMIN (GLUCOPHAGE) 500 MG tablet, Take 1 tablet by mouth 2 times daily (with meals)  atorvastatin (LIPITOR) 40 MG tablet, Take 1 tablet by mouth nightly  clopidogrel (PLAVIX) 75 MG tablet, Take 1 tablet by mouth daily  vitamin B-1 100 MG tablet, Take 1 tablet by mouth daily'  CURRENT MEDICATIONS     Current Facility-Administered Medications:     cefepime (MAXIPIME) 2000 mg IVPB extended (mini-bag), 2,000 mg, Intravenous, Q8H, Briana Hernandez MD, Stopped at 02/15/21 0993    sodium chloride flush 0.9 % injection 10 mL, 10 mL, Intravenous, 2 times per day, Briana Hernandez MD    sodium chloride flush 0.9 % injection 10 mL, 10 mL, Intravenous, PRN, Briana Hernandez MD    enoxaparin (LOVENOX) injection 40 mg, 40 mg, Subcutaneous, Daily, Briana Hernandez MD, 40 mg at 02/15/21 3231 Attends meetings of clubs or organizations: None     Relationship status: None    Intimate partner violence     Fear of current or ex partner: None     Emotionally abused: None     Physically abused: None     Forced sexual activity: None   Other Topics Concern    None   Social History Narrative    None     · FROM ECF    PHYSICAL EXAM    (up to 7 for level 4, 8 or more forlevel 5)     ED Triage Vitals   BP Temp Temp Source Pulse Resp SpO2 Height Weight   02/14/21 1839 02/14/21 1839 02/14/21 1839 02/14/21 1839 02/14/21 1839 02/14/21 1839 02/15/21 0004 02/14/21 1839   (!) 140/71 99.6 °F (37.6 °C) Oral 109 22 97 % 5' 11\" (1.803 m) 181 lb (82.1 kg)     Vitals:    Vitals:    02/14/21 2335 02/15/21 0004 02/15/21 0615 02/15/21 0851   BP:  (!) 140/73 136/70    Pulse:  82 90 84   Resp:  18 16    Temp: 98.9 °F (37.2 °C) 98.2 °F (36.8 °C) 98.3 °F (36.8 °C)    TempSrc: Oral Oral Oral    SpO2:  96% 96%    Weight:  182 lb (82.6 kg)     Height:  5' 11\" (1.803 m)       Physical Exam   Constitutional/General: Alert and oriented, NAD  Head: NC/AT  Eyes: PERRL, EOMI  Mouth: Normal mucosa, no thrush   Neck: Supple, full ROM,    Pulmonary: Lungs clear to auscultation bilaterally. Not in respiratory distress  Cardiovascular:  Regular rate and rhythm, no murmurs, gallops, or rubs. Abdomen: Soft, + BS. No distension. Nontender.     Extremities: Moves  RIGHT SIDE LEFT SIDE WEAK, ULCER HEEL LATEAL  LEFT >RIGHT ESCHAR GREY BLACK   Pulses:  Distal pulses DEC  Skin: Warm and dry without rash  Neurologic:  LEFT SIDE WEAKNESS  Psych: Normal Affect     DIAGNOSTICRESULTS   RADIOLOGY:   Xr Foot Left (2 Views)    Result Date: 2/14/2021 EXAMINATION: TWO XRAY VIEWS OF THE LEFT FOOT 2/14/2021 7:38 pm COMPARISON: None. HISTORY: ORDERING SYSTEM PROVIDED HISTORY: infected wound; r/o osteo TECHNOLOGIST PROVIDED HISTORY: Reason for exam:->infected wound; r/o osteo FINDINGS: There is no evidence of acute fracture. Possible remote fracture involving the 5th metatarsal neck. There is normal alignment of the tarsometatarsal joints. No acute joint abnormality. No focal osseous lesion. The osseous structures are osteopenic. No focal soft tissue abnormality. No acute osseous abnormality. No definitive evidence for osteomyelitis. The osseous structures are osteopenic. Xr Chest Portable    Result Date: 2/14/2021  EXAMINATION: ONE XRAY VIEW OF THE CHEST 2/14/2021 8:07 pm COMPARISON: December 30, 2020 HISTORY: ORDERING SYSTEM PROVIDED HISTORY: sepsis TECHNOLOGIST PROVIDED HISTORY: Reason for exam:->sepsis FINDINGS: No airspace opacity or pleural effusion. The heart is normal size. No pneumothorax. No free air beneath the hemidiaphragms. Multiple chronic left-sided rib fractures. No pneumonia or pleural effusion. LABS  Recent Labs     02/14/21  1907   WBC 8.0   HGB 9.6*   HCT 30.7*   MCV 82.5        Recent Labs     02/14/21  1907   *   K 4.4   CL 96*   CO2 20*   BUN 30*   CREATININE 0.8   GFRAA >60   LABGLOM >60   GLUCOSE 257*   PROT 7.8   LABALBU 3.7   CALCIUM 9.3   BILITOT 0.6   ALKPHOS 134*   AST 32   ALT 22     No results for input(s): PROCAL in the last 72 hours.   No results found for: CRP  Lab Results   Component Value Date    SEDRATE 87 (H) 02/14/2021     No results found for: [id number]  Lab Results   Component Value Date    COVID19 Not Detected 01/04/2021     COVID-19/TALYA-COV2 LABS  Recent Labs     02/14/21  1907   TROPONINI <0.01   AST 32   ALT 22     Lab Results   Component Value Date    CHOL 164 03/01/2016    TRIG 170 03/01/2016    HDL 43 03/01/2016    LDLCALC 87 03/01/2016    LABVLDL 34 03/01/2016         Lab Results Component Value Date    HEPAIGM Non-Reactive 12/28/2020    HEPBIGM Non-Reactive 12/28/2020    HCVABI Non-Reactive 12/28/2020     Hep C Ab Interp   Date Value Ref Range Status   12/28/2020 Non-Reactive NON REACT Final        MICROBIOLOGY:     Cultures :   Lab Results   Component Value Date    BC 5 Days no growth 12/26/2020     Lab Results   Component Value Date    BLOODCULT2 5 Days no growth 12/26/2020       No results found for: WNDABS    Smear, Respiratory   Date Value Ref Range Status   01/01/2021   Final    Group 6: <25 PMN's/LPF and <25 Epithelial cells/LPF  Rare Polymorphonuclear leukocytes  Epithelial cells not seen  No organisms seen       No results found for: MPNEUMO, CLAMYDCU, LABLEGI, AFBCX, FUNGSM, LABFUNG  CULTURE, RESPIRATORY   Date Value Ref Range Status   01/01/2021 Oral Pharyngeal Hannah absent  Growth not present    Final     MRSA Culture Only   Date Value Ref Range Status   12/26/2020 Methicillin resistant Staph aureus not isolated  Final       Patient is a 63 y.o. male who presented with   Chief Complaint   Patient presents with    Fever     102.2 at [facility] NF, 101.7 for EMS    Wound Check     BILAT HEELS X FEW WEEKS        FINAL IMPRESSION      BILATERAL HEEL PRESSURE ULCERS   -WOULD CHECK ARTERIAL STUDIES  -OFF LOAD  WOUND ARE  SEEN BY PODIATRY FOR SURGERY     UTI CHECK CX           cefepime (MAXIPIME) 2000 mg IVPB extended (mini-bag), Q8H    vancomycin (VANCOCIN) 1,250 mg in dextrose 5 % 250 mL IVPB, Q12H         Imaging and labs were reviewed per medical records and any ID pertinent labs were addressed with the patient. The patient/FAMILY  was educated about the diagnosis, prognosis, indications, risks and benefits of treatment. An opportunity to ask questions was given to the patient/FAMILY and questions were answered. Thank you for involving me in the care of Riccardo London. Please do not hesitate to call for any questions or concerns. Electronically signed by Trever Cruz MD on 2/15/2021 at 12:34 PM

## 2021-02-16 ENCOUNTER — ANESTHESIA (OUTPATIENT)
Dept: OPERATING ROOM | Age: 61
DRG: 622 | End: 2021-02-16
Payer: MEDICARE

## 2021-02-16 VITALS
SYSTOLIC BLOOD PRESSURE: 91 MMHG | RESPIRATION RATE: 1 BRPM | DIASTOLIC BLOOD PRESSURE: 49 MMHG | OXYGEN SATURATION: 100 %

## 2021-02-16 PROBLEM — B95.61 STAPHYLOCOCCUS AUREUS BACTEREMIA: Status: ACTIVE | Noted: 2021-02-16

## 2021-02-16 PROBLEM — R78.81 STAPHYLOCOCCUS AUREUS BACTEREMIA: Status: ACTIVE | Noted: 2021-02-16

## 2021-02-16 LAB
ACINETOBACTER BAUMANNII BY PCR: NOT DETECTED
ALBUMIN SERPL-MCNC: 2.7 G/DL (ref 3.5–5.2)
ALP BLD-CCNC: 87 U/L (ref 40–129)
ALT SERPL-CCNC: 19 U/L (ref 0–40)
ANION GAP SERPL CALCULATED.3IONS-SCNC: 10 MMOL/L (ref 7–16)
AST SERPL-CCNC: 26 U/L (ref 0–39)
BASOPHILS ABSOLUTE: 0.03 E9/L (ref 0–0.2)
BASOPHILS RELATIVE PERCENT: 0.5 % (ref 0–2)
BILIRUB SERPL-MCNC: 0.5 MG/DL (ref 0–1.2)
BOTTLE TYPE: ABNORMAL
BUN BLDV-MCNC: 16 MG/DL (ref 8–23)
CALCIUM SERPL-MCNC: 8.5 MG/DL (ref 8.6–10.2)
CANDIDA ALBICANS BY PCR: NOT DETECTED
CANDIDA GLABRATA BY PCR: NOT DETECTED
CANDIDA KRUSEI BY PCR: NOT DETECTED
CANDIDA PARAPSILOSIS BY PCR: NOT DETECTED
CANDIDA TROPICALIS BY PCR: NOT DETECTED
CHLORIDE BLD-SCNC: 101 MMOL/L (ref 98–107)
CO2: 21 MMOL/L (ref 22–29)
CREAT SERPL-MCNC: 0.7 MG/DL (ref 0.7–1.2)
ENTEROBACTER CLOACAE COMPLEX BY PCR: NOT DETECTED
ENTEROBACTERALES BY PCR: NOT DETECTED
ENTEROCOCCUS BY PCR: NOT DETECTED
EOSINOPHILS ABSOLUTE: 0.33 E9/L (ref 0.05–0.5)
EOSINOPHILS RELATIVE PERCENT: 5.1 % (ref 0–6)
ESCHERICHIA COLI BY PCR: NOT DETECTED
GFR AFRICAN AMERICAN: >60
GFR NON-AFRICAN AMERICAN: >60 ML/MIN/1.73
GLUCOSE BLD-MCNC: 87 MG/DL (ref 74–99)
HAEMOPHILUS INFLUENZAE BY PCR: NOT DETECTED
HCT VFR BLD CALC: 26.2 % (ref 37–54)
HEMOGLOBIN: 8.2 G/DL (ref 12.5–16.5)
IMMATURE GRANULOCYTES #: 0.04 E9/L
IMMATURE GRANULOCYTES %: 0.6 % (ref 0–5)
KLEBSIELLA OXYTOCA BY PCR: NOT DETECTED
KLEBSIELLA PNEUMONIAE GROUP BY PCR: NOT DETECTED
LISTERIA MONOCYTOGENES BY PCR: NOT DETECTED
LYMPHOCYTES ABSOLUTE: 2.22 E9/L (ref 1.5–4)
LYMPHOCYTES RELATIVE PERCENT: 34 % (ref 20–42)
MCH RBC QN AUTO: 25.8 PG (ref 26–35)
MCHC RBC AUTO-ENTMCNC: 31.3 % (ref 32–34.5)
MCV RBC AUTO: 82.4 FL (ref 80–99.9)
METER GLUCOSE: 255 MG/DL (ref 74–99)
METER GLUCOSE: 291 MG/DL (ref 74–99)
METER GLUCOSE: 327 MG/DL (ref 74–99)
METER GLUCOSE: 97 MG/DL (ref 74–99)
METHICILLIN RESISTANCE MECA/C  BY PCR: NOT DETECTED
MONOCYTES ABSOLUTE: 0.9 E9/L (ref 0.1–0.95)
MONOCYTES RELATIVE PERCENT: 13.8 % (ref 2–12)
NEISSERIA MENINGITIDIS BY PCR: NOT DETECTED
NEUTROPHILS ABSOLUTE: 3 E9/L (ref 1.8–7.3)
NEUTROPHILS RELATIVE PERCENT: 46 % (ref 43–80)
ORDER NUMBER: ABNORMAL
PDW BLD-RTO: 15.3 FL (ref 11.5–15)
PLATELET # BLD: 259 E9/L (ref 130–450)
PMV BLD AUTO: 9.5 FL (ref 7–12)
POTASSIUM REFLEX MAGNESIUM: 3.8 MMOL/L (ref 3.5–5)
PROTEUS SPECIES BY PCR: NOT DETECTED
PSEUDOMONAS AERUGINOSA BY PCR: NOT DETECTED
RBC # BLD: 3.18 E12/L (ref 3.8–5.8)
SERRATIA MARCESCENS BY PCR: NOT DETECTED
SODIUM BLD-SCNC: 132 MMOL/L (ref 132–146)
SOURCE OF BLOOD CULTURE: ABNORMAL
STAPHYLOCOCCUS AUREUS BY PCR: DETECTED
STAPHYLOCOCCUS SPECIES BY PCR: DETECTED
STREPTOCOCCUS AGALACTIAE BY PCR: NOT DETECTED
STREPTOCOCCUS PNEUMONIAE BY PCR: NOT DETECTED
STREPTOCOCCUS PYOGENES  BY PCR: NOT DETECTED
STREPTOCOCCUS SPECIES BY PCR: NOT DETECTED
TOTAL PROTEIN: 6.2 G/DL (ref 6.4–8.3)
VANCOMYCIN TROUGH: 14.7 MCG/ML (ref 5–16)
WBC # BLD: 6.5 E9/L (ref 4.5–11.5)

## 2021-02-16 PROCEDURE — 87205 SMEAR GRAM STAIN: CPT

## 2021-02-16 PROCEDURE — 3600000012 HC SURGERY LEVEL 2 ADDTL 15MIN: Performed by: PODIATRIST

## 2021-02-16 PROCEDURE — 1200000000 HC SEMI PRIVATE

## 2021-02-16 PROCEDURE — 97530 THERAPEUTIC ACTIVITIES: CPT | Performed by: PHYSICAL THERAPIST

## 2021-02-16 PROCEDURE — 2580000003 HC RX 258: Performed by: INTERNAL MEDICINE

## 2021-02-16 PROCEDURE — 6370000000 HC RX 637 (ALT 250 FOR IP): Performed by: INTERNAL MEDICINE

## 2021-02-16 PROCEDURE — 2580000003 HC RX 258: Performed by: NURSE ANESTHETIST, CERTIFIED REGISTERED

## 2021-02-16 PROCEDURE — 0KBW0ZZ EXCISION OF LEFT FOOT MUSCLE, OPEN APPROACH: ICD-10-PCS | Performed by: PODIATRIST

## 2021-02-16 PROCEDURE — 6360000002 HC RX W HCPCS: Performed by: SPECIALIST

## 2021-02-16 PROCEDURE — 82962 GLUCOSE BLOOD TEST: CPT

## 2021-02-16 PROCEDURE — 88307 TISSUE EXAM BY PATHOLOGIST: CPT

## 2021-02-16 PROCEDURE — 99232 SBSQ HOSP IP/OBS MODERATE 35: CPT | Performed by: INTERNAL MEDICINE

## 2021-02-16 PROCEDURE — 2500000003 HC RX 250 WO HCPCS: Performed by: PODIATRIST

## 2021-02-16 PROCEDURE — 87070 CULTURE OTHR SPECIMN AEROBIC: CPT

## 2021-02-16 PROCEDURE — 0QBM3ZX EXCISION OF LEFT TARSAL, PERCUTANEOUS APPROACH, DIAGNOSTIC: ICD-10-PCS | Performed by: PODIATRIST

## 2021-02-16 PROCEDURE — 87015 SPECIMEN INFECT AGNT CONCNTJ: CPT

## 2021-02-16 PROCEDURE — 88311 DECALCIFY TISSUE: CPT

## 2021-02-16 PROCEDURE — 7100000001 HC PACU RECOVERY - ADDTL 15 MIN: Performed by: PODIATRIST

## 2021-02-16 PROCEDURE — 6360000002 HC RX W HCPCS: Performed by: NURSE ANESTHETIST, CERTIFIED REGISTERED

## 2021-02-16 PROCEDURE — 3700000000 HC ANESTHESIA ATTENDED CARE: Performed by: PODIATRIST

## 2021-02-16 PROCEDURE — 2500000003 HC RX 250 WO HCPCS: Performed by: NURSE ANESTHETIST, CERTIFIED REGISTERED

## 2021-02-16 PROCEDURE — 6360000002 HC RX W HCPCS: Performed by: PODIATRIST

## 2021-02-16 PROCEDURE — 6360000002 HC RX W HCPCS: Performed by: INTERNAL MEDICINE

## 2021-02-16 PROCEDURE — 3600000002 HC SURGERY LEVEL 2 BASE: Performed by: PODIATRIST

## 2021-02-16 PROCEDURE — 85025 COMPLETE CBC W/AUTO DIFF WBC: CPT

## 2021-02-16 PROCEDURE — 87116 MYCOBACTERIA CULTURE: CPT

## 2021-02-16 PROCEDURE — 6370000000 HC RX 637 (ALT 250 FOR IP): Performed by: PODIATRIST

## 2021-02-16 PROCEDURE — 3700000001 HC ADD 15 MINUTES (ANESTHESIA): Performed by: PODIATRIST

## 2021-02-16 PROCEDURE — 2580000003 HC RX 258: Performed by: SPECIALIST

## 2021-02-16 PROCEDURE — 80202 ASSAY OF VANCOMYCIN: CPT

## 2021-02-16 PROCEDURE — 2580000003 HC RX 258: Performed by: PODIATRIST

## 2021-02-16 PROCEDURE — 2709999900 HC NON-CHARGEABLE SUPPLY: Performed by: PODIATRIST

## 2021-02-16 PROCEDURE — 97161 PT EVAL LOW COMPLEX 20 MIN: CPT | Performed by: PHYSICAL THERAPIST

## 2021-02-16 PROCEDURE — 94640 AIRWAY INHALATION TREATMENT: CPT

## 2021-02-16 PROCEDURE — 87176 TISSUE HOMOGENIZATION CULTR: CPT

## 2021-02-16 PROCEDURE — 80053 COMPREHEN METABOLIC PANEL: CPT

## 2021-02-16 PROCEDURE — 7100000000 HC PACU RECOVERY - FIRST 15 MIN: Performed by: PODIATRIST

## 2021-02-16 PROCEDURE — 87102 FUNGUS ISOLATION CULTURE: CPT

## 2021-02-16 PROCEDURE — 87075 CULTR BACTERIA EXCEPT BLOOD: CPT

## 2021-02-16 PROCEDURE — 36415 COLL VENOUS BLD VENIPUNCTURE: CPT

## 2021-02-16 PROCEDURE — 87077 CULTURE AEROBIC IDENTIFY: CPT

## 2021-02-16 PROCEDURE — 87186 SC STD MICRODIL/AGAR DIL: CPT

## 2021-02-16 PROCEDURE — 87206 SMEAR FLUORESCENT/ACID STAI: CPT

## 2021-02-16 PROCEDURE — 87040 BLOOD CULTURE FOR BACTERIA: CPT

## 2021-02-16 RX ORDER — FENTANYL CITRATE 50 UG/ML
INJECTION, SOLUTION INTRAMUSCULAR; INTRAVENOUS PRN
Status: DISCONTINUED | OUTPATIENT
Start: 2021-02-16 | End: 2021-02-16 | Stop reason: SDUPTHER

## 2021-02-16 RX ORDER — SODIUM CHLORIDE 9 MG/ML
INJECTION, SOLUTION INTRAVENOUS CONTINUOUS PRN
Status: DISCONTINUED | OUTPATIENT
Start: 2021-02-16 | End: 2021-02-16 | Stop reason: SDUPTHER

## 2021-02-16 RX ORDER — LABETALOL HYDROCHLORIDE 5 MG/ML
5 INJECTION, SOLUTION INTRAVENOUS EVERY 10 MIN PRN
Status: DISCONTINUED | OUTPATIENT
Start: 2021-02-16 | End: 2021-02-16 | Stop reason: HOSPADM

## 2021-02-16 RX ORDER — OXYCODONE HYDROCHLORIDE AND ACETAMINOPHEN 5; 325 MG/1; MG/1
1 TABLET ORAL EVERY 4 HOURS PRN
Status: DISCONTINUED | OUTPATIENT
Start: 2021-02-16 | End: 2021-02-16 | Stop reason: HOSPADM

## 2021-02-16 RX ORDER — ONDANSETRON 2 MG/ML
4 INJECTION INTRAMUSCULAR; INTRAVENOUS
Status: DISCONTINUED | OUTPATIENT
Start: 2021-02-16 | End: 2021-02-16 | Stop reason: HOSPADM

## 2021-02-16 RX ORDER — MIDAZOLAM HYDROCHLORIDE 1 MG/ML
INJECTION INTRAMUSCULAR; INTRAVENOUS PRN
Status: DISCONTINUED | OUTPATIENT
Start: 2021-02-16 | End: 2021-02-16 | Stop reason: SDUPTHER

## 2021-02-16 RX ORDER — BUPIVACAINE HYDROCHLORIDE 5 MG/ML
INJECTION, SOLUTION EPIDURAL; INTRACAUDAL PRN
Status: DISCONTINUED | OUTPATIENT
Start: 2021-02-16 | End: 2021-02-16 | Stop reason: ALTCHOICE

## 2021-02-16 RX ORDER — PROCHLORPERAZINE EDISYLATE 5 MG/ML
5 INJECTION INTRAMUSCULAR; INTRAVENOUS
Status: DISCONTINUED | OUTPATIENT
Start: 2021-02-16 | End: 2021-02-16 | Stop reason: HOSPADM

## 2021-02-16 RX ORDER — OXYCODONE HYDROCHLORIDE 5 MG/1
5 TABLET ORAL EVERY 4 HOURS PRN
Status: DISCONTINUED | OUTPATIENT
Start: 2021-02-16 | End: 2021-02-23 | Stop reason: HOSPADM

## 2021-02-16 RX ORDER — DIPHENHYDRAMINE HYDROCHLORIDE 50 MG/ML
12.5 INJECTION INTRAMUSCULAR; INTRAVENOUS
Status: DISCONTINUED | OUTPATIENT
Start: 2021-02-16 | End: 2021-02-16 | Stop reason: HOSPADM

## 2021-02-16 RX ORDER — DEXAMETHASONE SODIUM PHOSPHATE 10 MG/ML
INJECTION, SOLUTION INTRAMUSCULAR; INTRAVENOUS PRN
Status: DISCONTINUED | OUTPATIENT
Start: 2021-02-16 | End: 2021-02-16 | Stop reason: SDUPTHER

## 2021-02-16 RX ORDER — MEPERIDINE HYDROCHLORIDE 25 MG/ML
12.5 INJECTION INTRAMUSCULAR; INTRAVENOUS; SUBCUTANEOUS EVERY 5 MIN PRN
Status: DISCONTINUED | OUTPATIENT
Start: 2021-02-16 | End: 2021-02-16 | Stop reason: HOSPADM

## 2021-02-16 RX ORDER — LIDOCAINE HYDROCHLORIDE 20 MG/ML
INJECTION, SOLUTION INTRAVENOUS PRN
Status: DISCONTINUED | OUTPATIENT
Start: 2021-02-16 | End: 2021-02-16 | Stop reason: SDUPTHER

## 2021-02-16 RX ORDER — KETOROLAC TROMETHAMINE 30 MG/ML
INJECTION, SOLUTION INTRAMUSCULAR; INTRAVENOUS PRN
Status: DISCONTINUED | OUTPATIENT
Start: 2021-02-16 | End: 2021-02-16 | Stop reason: SDUPTHER

## 2021-02-16 RX ORDER — PROPOFOL 10 MG/ML
INJECTION, EMULSION INTRAVENOUS CONTINUOUS PRN
Status: DISCONTINUED | OUTPATIENT
Start: 2021-02-16 | End: 2021-02-16 | Stop reason: SDUPTHER

## 2021-02-16 RX ORDER — CYCLOBENZAPRINE HCL 10 MG
10 TABLET ORAL 3 TIMES DAILY PRN
Status: DISCONTINUED | OUTPATIENT
Start: 2021-02-16 | End: 2021-02-23 | Stop reason: HOSPADM

## 2021-02-16 RX ORDER — KETAMINE HYDROCHLORIDE 50 MG/ML
INJECTION, SOLUTION, CONCENTRATE INTRAMUSCULAR; INTRAVENOUS PRN
Status: DISCONTINUED | OUTPATIENT
Start: 2021-02-16 | End: 2021-02-16 | Stop reason: SDUPTHER

## 2021-02-16 RX ORDER — ONDANSETRON 2 MG/ML
INJECTION INTRAMUSCULAR; INTRAVENOUS PRN
Status: DISCONTINUED | OUTPATIENT
Start: 2021-02-16 | End: 2021-02-16 | Stop reason: SDUPTHER

## 2021-02-16 RX ORDER — INSULIN GLARGINE 100 [IU]/ML
8 INJECTION, SOLUTION SUBCUTANEOUS NIGHTLY
Status: DISCONTINUED | OUTPATIENT
Start: 2021-02-16 | End: 2021-02-23 | Stop reason: HOSPADM

## 2021-02-16 RX ADMIN — VANCOMYCIN HYDROCHLORIDE 1250 MG: 10 INJECTION, POWDER, LYOPHILIZED, FOR SOLUTION INTRAVENOUS at 22:45

## 2021-02-16 RX ADMIN — SODIUM CHLORIDE: 9 INJECTION, SOLUTION INTRAVENOUS at 16:40

## 2021-02-16 RX ADMIN — PANTOPRAZOLE SODIUM 40 MG: 40 TABLET, DELAYED RELEASE ORAL at 05:28

## 2021-02-16 RX ADMIN — LIDOCAINE HYDROCHLORIDE 50 MG: 20 INJECTION, SOLUTION INTRAVENOUS at 07:20

## 2021-02-16 RX ADMIN — KETAMINE HYDROCHLORIDE 25 MG: 50 INJECTION INTRAMUSCULAR; INTRAVENOUS at 07:20

## 2021-02-16 RX ADMIN — PROPOFOL 75 MCG/KG/MIN: 10 INJECTION, EMULSION INTRAVENOUS at 07:20

## 2021-02-16 RX ADMIN — ASPIRIN 81 MG: 81 TABLET, COATED ORAL at 10:18

## 2021-02-16 RX ADMIN — CEFEPIME 2000 MG: 2 INJECTION, POWDER, FOR SOLUTION INTRAMUSCULAR; INTRAVENOUS at 05:28

## 2021-02-16 RX ADMIN — MIDAZOLAM 2 MG: 1 INJECTION INTRAMUSCULAR; INTRAVENOUS at 07:17

## 2021-02-16 RX ADMIN — IPRATROPIUM BROMIDE AND ALBUTEROL SULFATE 3 ML: .5; 3 SOLUTION RESPIRATORY (INHALATION) at 04:22

## 2021-02-16 RX ADMIN — HYDROCHLOROTHIAZIDE 25 MG: 25 TABLET ORAL at 10:18

## 2021-02-16 RX ADMIN — FENTANYL CITRATE 50 MCG: 50 INJECTION, SOLUTION INTRAMUSCULAR; INTRAVENOUS at 07:26

## 2021-02-16 RX ADMIN — ONDANSETRON 4 MG: 2 INJECTION INTRAMUSCULAR; INTRAVENOUS at 07:20

## 2021-02-16 RX ADMIN — INSULIN GLARGINE 8 UNITS: 100 INJECTION, SOLUTION SUBCUTANEOUS at 20:21

## 2021-02-16 RX ADMIN — INSULIN LISPRO 4 UNITS: 100 INJECTION, SOLUTION INTRAVENOUS; SUBCUTANEOUS at 16:39

## 2021-02-16 RX ADMIN — VANCOMYCIN HYDROCHLORIDE 1250 MG: 10 INJECTION, POWDER, LYOPHILIZED, FOR SOLUTION INTRAVENOUS at 10:18

## 2021-02-16 RX ADMIN — CYCLOBENZAPRINE 10 MG: 10 TABLET, FILM COATED ORAL at 21:25

## 2021-02-16 RX ADMIN — FENTANYL CITRATE 50 MCG: 50 INJECTION, SOLUTION INTRAMUSCULAR; INTRAVENOUS at 07:21

## 2021-02-16 RX ADMIN — DEXAMETHASONE SODIUM PHOSPHATE 4 MG: 10 INJECTION, SOLUTION INTRAMUSCULAR; INTRAVENOUS at 07:20

## 2021-02-16 RX ADMIN — ATORVASTATIN CALCIUM 40 MG: 40 TABLET, FILM COATED ORAL at 20:23

## 2021-02-16 RX ADMIN — SODIUM CHLORIDE: 9 INJECTION, SOLUTION INTRAVENOUS at 21:26

## 2021-02-16 RX ADMIN — SODIUM CHLORIDE: 9 INJECTION, SOLUTION INTRAVENOUS at 07:17

## 2021-02-16 RX ADMIN — OXYCODONE 5 MG: 5 TABLET ORAL at 21:28

## 2021-02-16 RX ADMIN — CLOPIDOGREL BISULFATE 75 MG: 75 TABLET ORAL at 10:18

## 2021-02-16 RX ADMIN — INSULIN LISPRO 2 UNITS: 100 INJECTION, SOLUTION INTRAVENOUS; SUBCUTANEOUS at 20:23

## 2021-02-16 RX ADMIN — KETOROLAC TROMETHAMINE 30 MG: 30 INJECTION, SOLUTION INTRAMUSCULAR at 07:37

## 2021-02-16 RX ADMIN — CEFEPIME 2000 MG: 2 INJECTION, POWDER, FOR SOLUTION INTRAMUSCULAR; INTRAVENOUS at 17:55

## 2021-02-16 RX ADMIN — Medication 9 MG: at 21:25

## 2021-02-16 ASSESSMENT — PULMONARY FUNCTION TESTS
PIF_VALUE: 0
PIF_VALUE: 0
PIF_VALUE: 1
PIF_VALUE: 0
PIF_VALUE: 1

## 2021-02-16 ASSESSMENT — PAIN SCALES - GENERAL: PAINLEVEL_OUTOF10: 0

## 2021-02-16 NOTE — PLAN OF CARE
Problem: Pain:  Goal: Pain level will decrease  Description: Pain level will decrease  Outcome: Met This Shift  Goal: Control of acute pain  Description: Control of acute pain  Outcome: Met This Shift  Goal: Control of chronic pain  Description: Control of chronic pain  Outcome: Met This Shift     Problem: Falls - Risk of:  Goal: Will remain free from falls  Description: Will remain free from falls  Outcome: Met This Shift  Goal: Absence of physical injury  Description: Absence of physical injury  Outcome: Met This Shift     Problem: Skin Integrity:  Goal: Will show no infection signs and symptoms  Description: Will show no infection signs and symptoms  Outcome: Met This Shift  Goal: Absence of new skin breakdown  Description: Absence of new skin breakdown  Outcome: Met This Shift     Problem: Inadequate oral food/beverage intake (NI-2.1)  Goal: Food and/or Nutrient Delivery  Description: Individualized approach for food/nutrient provision.   2/15/2021 1550 by Fabien Lees RD, LD  Outcome: Met This Shift

## 2021-02-16 NOTE — PROGRESS NOTES
[address] [place], [place] [address]ist   Progress Note    Admitting Date and Time: 2/14/2021  6:32 PM  Admit Dx: Diabetic foot infection ([address] [E11.628, L08.9]    Subjective/interval history:    Pt has some postoperative pain. He had wound debridement and bone biopsy of the left heel this morning. Also having some muscle spasms in his leg, Flexeril did not provide much relief. Labs today significant for blood cultures positive for MSSA by PCR. Urine cultures positive for greater than 100,000 CFU/mL, final results and sensitivities pending. ROS: denies fever, chills, cp, sob, n/v, HA unless stated above.      insulin glargine  8 Units Subcutaneous Nightly    sodium chloride flush  10 mL Intravenous 2 times per day    enoxaparin  40 mg Subcutaneous Daily    atorvastatin  40 mg Oral Nightly    clopidogrel  75 mg Oral Daily    aspirin  81 mg Oral Daily    hydroCHLOROthiazide  25 mg Oral Daily    ipratropium-albuterol  3 mL Inhalation Q4H    pantoprazole  40 mg Oral QAM AC    vancomycin  1,250 mg Intravenous Q12H    cefepime  2,000 mg Intravenous Q12H    insulin lispro  0-6 Units Subcutaneous TID WC    insulin lispro  0-3 Units Subcutaneous Nightly         oxyCODONE, 5 mg, Q4H PRN      cyclobenzaprine, 10 mg, TID PRN      sodium chloride flush, 10 mL, PRN      promethazine, 12.5 mg, Q6H PRN    Or      ondansetron, 4 mg, Q6H PRN      polyethylene glycol, 17 g, Daily PRN      acetaminophen, 650 mg, Q6H PRN    Or      acetaminophen, 650 mg, Q6H PRN      melatonin, 9 mg, Nightly PRN      glucose, 15 g, PRN      dextrose, 12.5 g, PRN      glucagon (rDNA), 1 mg, PRN      dextrose, 100 mL/hr, PRN         Objective:    BP (!) 113/54   Pulse 68   Temp 97.9 °F (36.6 °C) (Oral)   Resp 18   Ht 5' 11\" (1.803 m)   Wt 182 lb (82.6 kg)   SpO2 95%   BMI 25.38 kg/m²   General Appearance: alert and oriented to person, place and time and in no acute distress  Skin: warm and dry Head: normocephalic and atraumatic  Eyes: pupils equal, round, and reactive to light, extraocular eye movements intact, conjunctivae normal  Neck: neck supple and non tender without mass   Pulmonary/Chest: clear to auscultation bilaterally- no wheezes, rales or rhonchi, normal air movement, no respiratory distress  Cardiovascular: normal rate, normal S1 and S2 and no carotid bruits  Abdomen: soft, non-tender, non-distended, normal bowel sounds, no masses or organomegaly  Extremities: Left foot and postoperative dressing. No cyanosis, no clubbing and no edema  Neurologic: no cranial nerve deficit and speech normal      Recent Labs     02/14/21 1907 02/16/21  0515   * 132   K 4.4 3.8   CL 96* 101   CO2 20* 21*   BUN 30* 16   CREATININE 0.8 0.7   GLUCOSE 257* 87   CALCIUM 9.3 8.5*       Recent Labs     02/14/21 1907 02/16/21  0515   ALKPHOS 134* 87   PROT 7.8 6.2*   LABALBU 3.7 2.7*   BILITOT 0.6 0.5   AST 32 26   ALT 22 19       Recent Labs     02/14/21 1907 02/16/21  0515   WBC 8.0 6.5   RBC 3.72* 3.18*   HGB 9.6* 8.2*   HCT 30.7* 26.2*   MCV 82.5 82.4   MCH 25.8* 25.8*   MCHC 31.3* 31.3*   RDW 15.3* 15.3*    259   MPV 9.6 9.5       CBC:   Lab Results   Component Value Date    WBC 6.5 02/16/2021    RBC 3.18 02/16/2021    HGB 8.2 02/16/2021    HCT 26.2 02/16/2021    MCV 82.4 02/16/2021    MCH 25.8 02/16/2021    MCHC 31.3 02/16/2021    RDW 15.3 02/16/2021     02/16/2021    MPV 9.5 02/16/2021     BMP:    Lab Results   Component Value Date     02/16/2021    K 3.8 02/16/2021     02/16/2021    CO2 21 02/16/2021    BUN 16 02/16/2021    LABALBU 2.7 02/16/2021    CREATININE 0.7 02/16/2021    CALCIUM 8.5 02/16/2021    GFRAA >60 02/16/2021    LABGLOM >60 02/16/2021    GLUCOSE 87 02/16/2021        Radiology:   VL LOWER EXTREMITY ARTERIAL SEGMENTAL PRESSURES W PPG BILATERAL   Final Result   1.  Significant pressure gradient at the left thigh level that could be associated with significant stenosis. 2. Normal right ankle brachial index at 1.01. The left MAHOGANY is mildly   decreased at 0.93. Bilateral decreased toe brachial indices is 0.29 on the   right and 0.28 on the left. 3. Abnormal PVRs at the metatarsal level, greater on the left and digital   level. Findings could be related to significant distal small vessel disease. 4. Correlation with imaging such as doppler ultrasound or CT angiography   could be considered. XR FOOT LEFT (2 VIEWS)   Final Result   No acute osseous abnormality. No definitive evidence for osteomyelitis. The osseous structures are osteopenic. XR CHEST PORTABLE   Final Result   No pneumonia or pleural effusion. Assessment/Plan:  Principal Problem:    Staphylococcus aureus bacteremia  Active Problems:    Diabetic foot infection ([facility]    Hypertension    Hemiparesis affecting left side as late effect of cerebrovascular accident [facility])    UTI (urinary tract infection)  Resolved Problems:    * No resolved hospital problems. *      1. Staph aureus bacteremia  -Currently on vancomycin and cefepime. Infectious disease following  -Source is likely diabetic foot infection, and there is concern for osteomyelitis. Bone biopsy was taken in the operating room today with result pending. 2.  Diabetic foot infection/bilateral lower extremity cellulitis  -continue vancomycin and cefepime  -Post wound debridement and bone biopsy left foot today 2/16  -Bone biopsy pathology and culture pending    3. Uncontrolled type 2 diabetes mellitus  -Started basal insulin and corrective scale while inpatient 2/15 for goal blood glucose between 120 and 180  -Glucose was 87 this morning. We will decrease basal insulin to 8 units of Lantus nightly    4. UTI  -on coverage with cefepime  -Urine culture growing greater than 100,000 CFU/mL of gram-negative rods, final result pending    5.   History of stroke  -Continue aspirin and statin DVT prophylaxis: Subcutaneous enoxaparin  Full code    NOTE: This report was transcribed using voice recognition software. Every effort was made to ensure accuracy; however, inadvertent computerized transcription errors may be present.      Electronically signed by Pardeep Mahmood DO on 2/16/2021 at 2:45 PM

## 2021-02-16 NOTE — OP NOTE
[address]                                OPERATIVE REPORT    PATIENT NAME: Srini Solorzano                   :        1960  MED REC NO:   [id number]                            ROOM:       0527  ACCOUNT NO:   [de-identified]                           ADMIT DATE: 2021  PROVIDER:     Hans Rasheed DPM    DATE OF PROCEDURE:  2021    SURGEON:  Hans Rasheed DPM    ASSISTANT:  Adrienne Covington, PGY-2    PREOPERATIVE DIAGNOSES:  1. Left heel ulceration and necrosis of muscle. 2.  Peripheral arterial disease. 3.  Insulin-dependent diabetes mellitus with neuropathy. 4.  Acute osteomyelitis, left foot. 5.  Cellulitis, left foot with abscess. POSTOPERATIVE DIAGNOSES:  1. Left heel ulceration and necrosis of muscle. 2.  Peripheral arterial disease. 3.  Insulin-dependent diabetes mellitus with neuropathy. 4.  Acute osteomyelitis, left foot. 5.  Cellulitis, left foot with abscess. PROCEDURES PERFORMED:  1. Excision and debridement of the left heel ulceration to and through  level of deep fascia and muscle. Total measurement is 4.5 cm x 6.0 cm x  0.5 cm in dimension. 2.  Incision and drainage, left foot abscess. 3.  Bone biopsy, left foot. 4.  Application of wound VAC negative pressure therapy. ANESTHESIA:  MAC with local anesthesia infiltration, 20 mL of 0.5%  Marcaine plain. HEMOSTASIS:  On the field. COMPLICATIONS:  None. MATERIALS USED:  KCI negative pressure therapy. SPECIMEN:  Left heel wound, left heel abscess as well as left calcaneus  bone. INTRAOPERATIVE FINDINGS:  Necrosis of soft tissue noted. Approximately  5 mL seropurulent abscess noted as well with significant malodor. ESTIMATED BLOOD LOSS:  Minimal.    INDICATIONS:  This is a pleasant 65-year-old male who presented today  for left foot debridement and incision and drainage.   The patient has  had necrotic left heel ulceration for quite some time, presented today  for debridement. I counseled the patient on the nature of the problem,  proposed course of procedure, potential benefits, risks, complications,  and convalescence in detail. All questions have been answered to his  apparent satisfaction. No guarantees have been given as to the outcome  of the procedure. OPERATIVE PROCEDURE:  Under mild sedation, the patient was brought to  the operating room and placed on the operating table in supine position. The left lower extremity was scrubbed, prepped, and draped in the usual  sterile fashion. At this time using a #10 blade, I performed a sharp  excisional debridement of the wound to and through level of deep fascia  and muscle. Wound and necrotic tissues were removed from the surgical  site in toto for microbiology examination. Next, using a #10 blade, I  extended the incision slightly distal 2 cm and slightly proximal another  2 cm, opened up both compartments and I was able to express  approximately 5 mL seropurulent drainage from those areas. These areas  were then cultured for microbiology examination. Next, I irrigated the  areas with copious amount of normal sterile saline. Post irrigation, I  then performed a bone biopsy with Jamshidi needle extracting cortical  cancellous bone for pathological and microbiology examination. Next, I  applied the wound VAC negative pressure therapy set at 125 mmHg  continuous. Excellent seal was maintained. Next, postoperative bandage  was then applied. The patient tolerated the procedure and anesthesia well in apparent  satisfactory condition with vital signs stable and vascular status  intact to the left lower extremity. The patient was then transferred to  the PACU for further monitoring prior to readmission to the nursing  floor.         Erin Patterson DPM    D: 02/16/2021 7:45:01       T: 02/16/2021 9:34:22     [email or web address]  Job#: [id number]     Doc#: [id number]    CC:

## 2021-02-16 NOTE — PROGRESS NOTES
[facility]  [facility]  Progress Note    NAME: Rut Zamarripa  MR:  [id number]  :   1960  DATE OF SERVICE:21    This is a face to face encounter with Rut Zamarripa 63 y.o. male on 21  ID following for   Chief Complaint   Patient presents with    Fever     102.[facility], 101.7 for EMS    Wound Check     BILAT HEELS X FEW WEEKS     SUBJECTIVE:  Pt in bed has vacc has no c/o has no questions  Patient is tolerating medications. No reported adverse drug reactions. Review of systems:  As stated above in the chief complaint, otherwise negative. Medications:  Scheduled Meds:   insulin glargine  8 Units Subcutaneous Nightly    sodium chloride flush  10 mL Intravenous 2 times per day    enoxaparin  40 mg Subcutaneous Daily    atorvastatin  40 mg Oral Nightly    clopidogrel  75 mg Oral Daily    aspirin  81 mg Oral Daily    hydroCHLOROthiazide  25 mg Oral Daily    ipratropium-albuterol  3 mL Inhalation Q4H    pantoprazole  40 mg Oral QAM AC    vancomycin  1,250 mg Intravenous Q12H    cefepime  2,000 mg Intravenous Q12H    insulin lispro  0-6 Units Subcutaneous TID WC    insulin lispro  0-3 Units Subcutaneous Nightly     Continuous Infusions:   sodium chloride 100 mL/hr at 02/15/21 1155    sodium chloride Stopped (02/15/21 2331)    dextrose       PRN Meds:oxyCODONE, cyclobenzaprine, sodium chloride flush, promethazine **OR** ondansetron, polyethylene glycol, acetaminophen **OR** acetaminophen, melatonin, glucose, dextrose, glucagon (rDNA), dextrose    OBJECTIVE:  BP (!) 113/54   Pulse 68   Temp 97.9 °F (36.6 °C) (Oral)   Resp 18   Ht 5' 11\" (1.803 m)   Wt 182 lb (82.6 kg)   SpO2 95%   BMI 25.38 kg/m²   Temp  Av.9 °F (36.6 °C)  Min: 97.2 °F (36.2 °C)  Max: 98.8 °F (37.1 °C)  Constitutional:  The patient is awake, alert, and oriented. Skin:    Warm and dry. No rashes were noted.    HEENT:      AT/NC Gram positive cocci in clusters      BLOODCULT2 5 Days no growth 12/26/2020    ORG Gram negative justo 02/14/2021     WOUND/ABSCESS   Date Value Ref Range Status   02/14/2021   Preliminary    Growth present, evaluating for:  Mixed Gram positive organisms  Proteus species       Smear, Respiratory   Date Value Ref Range Status   01/01/2021   Final    Group 6: <25 PMN's/LPF and <25 Epithelial cells/LPF  Rare Polymorphonuclear leukocytes  Epithelial cells not seen  No organisms seen       No results found for: MPNEUMO, CLAMYDCU, LABLEGI, AFBCX, FUNGSM, LABFUNG    MRSA Culture Only   Date Value Ref Range Status   12/26/2020 Methicillin resistant Staph aureus not isolated  Final     CULTURE, RESPIRATORY   Date Value Ref Range Status   01/01/2021 Oral Pharyngeal Hannha absent  Growth not present    Final     Recent Labs     02/14/21 2059   ORG Gram negative justo*     Recent Labs     02/14/21 2059   WNDABS Growth present, evaluating for:  Mixed Gram positive organisms  Proteus species     ORG Gram negative justo*        ASSESSMENT/PLAN:  MSSA bacteremia   BILATERAL HEEL PRESSURE ULCERS   NECROTIC WOUND, OSTEOMYELITIS, Cellulitis left foot  S/p LEFT FOOT DEBRIDEMENT WITH BONE BIOPSY, WOUND VAC APPLICATION  UTI  cx pending GN  2/14 wound cx Mixed Gram positive organisms   Proteus species       vancomycin (VANCOCIN) 1,250 mg in dextrose 5 % 250 mL IVPB, Q12H pharmacy dosing     cefepime (MAXIPIME) 2000 mg IVPB extended (mini-bag), Q12H     Will cont with atbx await final cx  · Monitor labs    Imaging and labs were reviewed per medical records. The patient was educated about the diagnosis, prognosis, indications, risks and benefits of treatment. An opportunity to ask questions was given to the patient/FAMILY. Thank you for involving me in the care of Fiorella Tobar I will continue to follow. Please do not hesitate to call for any questions or concerns. Electronically signed by Irene Deluca MD on 2/16/2021 at 3:44 PM

## 2021-02-16 NOTE — PROGRESS NOTES
Pharmacy Consultation Note  (Antibiotic Dosing and Monitoring)    Initial consult date: 2/15/2021  Consulting physician: Dr. Negrito Muñiz  Drug(s): Vancomycin  Indication: SSTI    Ht Readings from Last 1 Encounters:   02/15/21 5' 11\" (1.803 m)     Wt Readings from Last 1 Encounters:   02/15/21 182 lb (82.6 kg)     Age/  Gender IBW DW  Allergy Information   63 y.o.   male 75.3 kg 82.6 kg  Pcn [penicillins]          Date  Tmax WBC BUN/CR UOP  (mL/kg/hr) Drug/Dose Time   Given Level(s)   (Time) Comments   2/14  (#1) 99.6 8 30/0.8 -- Vancomycin 1250 mg IV x 1 2142     2/15  (#2) afebrile -- -- -- Vancomycin 1250 mg IV q12hr 1154  2215     2/16  (#3) afebrile 6.5 16/0.7 -- Vancomycin 1250 mg IV q12hr 1018  <2200> Trough @ <2130> =       (#4)             (#5)             (#6)             (#7)             Estimated Creatinine Clearance: 118 mL/min (based on SCr of 0.7 mg/dL). UOP over the past 24 hours:       Intake/Output Summary (Last 24 hours) at 2/16/2021 1245  Last data filed at 2/16/2021 1117  Gross per 24 hour   Intake 880 ml   Output    Net 880 ml     Other anti-infectives: Anti-infective Dose Date Initiated Date Stopped   Cefepime 2g IV q12hr 2/14      Cultures:  available culture and sensitivity results were reviewed in EPIC  Cultures sent and are pending. Culture Date Result    Blood cx 1 2/14 NGTD   Blood cx 2 2/14 GPC in clusters   Urine cx 2/14 >100,000 CFU/mL GNR   Wound cx 2/14 Growth present, evaluating for: Mixed GPO, Proteus species          Assessment:  · Consulted by Dr. Negrito Muñiz to dose/monitor vancomycin  · Goal trough level:  15-20 mcg/mL  · Pt is a 62 y/o male who presented from home diabetic foot infection and cellulitis. · Serum creatinine today: 0.7 mg/dL; CrCl ~ 103 mL/min; baseline Scr ~ 0.8 mg/dL    Plan:  · Vancomycin 1250 mg IV q12hr  · Trough tonight @ 2130.  Hold dose for level > 20 mcg/mL  · Follow renal function  · Pharmacist will follow and monitor/adjust dosing as necessary Thank you for the consult,    Mike Toro, PharmD, BCPS 2/16/2021 12:45 PM   Ext: [phone]

## 2021-02-16 NOTE — ANESTHESIA POSTPROCEDURE EVALUATION
Department of Anesthesiology  Postprocedure Note    Patient: Diana Lugo  MRN: [id number]  YOB: 1960  Date of evaluation: 2/16/2021  Time:  9:25 AM     Procedure Summary     Date: 02/16/21 Room / Location: [address] [place] [address] 03 / [facility]    Anesthesia Start: 1953 Anesthesia Stop: 8994    Procedure: LEFT FOOT DEBRIDEMENT WITH BONE BIOPSY, WOUND VAC APPLICATION (Left Foot) Diagnosis: (NECROTIC WOUND, OSTEOMYELITIS)    Surgeons: Beronica Baird DPM Responsible Provider: Mary Ruff MD    Anesthesia Type: MAC ASA Status: 3          Anesthesia Type: MAC    Stephen Phase I: Stephen Score: 10    Stephen Phase II:      Last vitals: Reviewed and per EMR flowsheets.        Anesthesia Post Evaluation    Patient location during evaluation: PACU  Patient participation: complete - patient participated  Level of consciousness: awake and alert  Pain score: 2  Airway patency: patent  Nausea & Vomiting: no nausea and no vomiting  Complications: no  Cardiovascular status: hemodynamically stable  Respiratory status: acceptable  Hydration status: euvolemic

## 2021-02-16 NOTE — BRIEF OP NOTE
Brief Postoperative Note      Patient: Scott Coughlin  YOB: 1960  MRN: [id number]    Date of Procedure: 2/16/2021    Pre-Op Diagnosis: NECROTIC WOUND, OSTEOMYELITIS, Cellulitis left foot    Post-Op Diagnosis: Same       Procedure(s):  LEFT FOOT DEBRIDEMENT WITH BONE BIOPSY, WOUND VAC APPLICATION    Surgeon(s):  Rober Lawler DPM    Assistant:  Resident: Oswaldo Laurent DPM    Anesthesia: General    Estimated Blood Loss (mL): Minimal    Complications: None    Specimens:   ID Type Source Tests Collected by Time Destination   1 : LEFT HEEL WOUND TISSUE FOR CULTURE-AEROBIC, ANAEROBIC, ACID FAST, GRAM STAIN, FUNGUS Tissue Foot CULTURE, ANAEROBIC, CULTURE, FUNGUS, GRAM STAIN, CULTURE, SURGICAL, CULTURE WITH SMEAR, ACID FAST BACILLIUS Rober Harrell DPM 2/16/2021 0730    2 : LEFT HEEL WOUND BONE FOR CULTURE-AEROBIC, ANAEROBIC, ACID FAST, GRAM STAIN, FUNGUS Bone Foot CULTURE, ANAEROBIC, CULTURE, FUNGUS, GRAM STAIN, CULTURE, SURGICAL, CULTURE WITH SMEAR, ACID FAST BACILLIUS Rober Harrell DPM 2/16/2021 0732    A : LEFT HEEL BONE  Bone Bone SURGICAL PATHOLOGY Rober Harrell DPM 2/16/2021 0733        Implants:  * No implants in log *      Drains:   [REMOVED] Urethral Catheter (Removed)       Findings: Necrosis of soft tissue. 5 cc seropurulent drainage.     Electronically signed by Tan Escamilla DPM on 2/16/2021 at 7:44 AM

## 2021-02-17 ENCOUNTER — APPOINTMENT (OUTPATIENT)
Dept: CT IMAGING | Age: 61
DRG: 622 | End: 2021-02-17
Payer: MEDICARE

## 2021-02-17 LAB
ANION GAP SERPL CALCULATED.3IONS-SCNC: 11 MMOL/L (ref 7–16)
BUN BLDV-MCNC: 16 MG/DL (ref 8–23)
CALCIUM SERPL-MCNC: 9 MG/DL (ref 8.6–10.2)
CHLORIDE BLD-SCNC: 102 MMOL/L (ref 98–107)
CO2: 21 MMOL/L (ref 22–29)
CREAT SERPL-MCNC: 0.6 MG/DL (ref 0.7–1.2)
GFR AFRICAN AMERICAN: >60
GFR NON-AFRICAN AMERICAN: >60 ML/MIN/1.73
GLUCOSE BLD-MCNC: 176 MG/DL (ref 74–99)
GRAM STAIN ORDERABLE: NORMAL
GRAM STAIN ORDERABLE: NORMAL
HCT VFR BLD CALC: 26.5 % (ref 37–54)
HEMOGLOBIN: 8.3 G/DL (ref 12.5–16.5)
LV EF: 63 %
LVEF MODALITY: NORMAL
MCH RBC QN AUTO: 25.5 PG (ref 26–35)
MCHC RBC AUTO-ENTMCNC: 31.3 % (ref 32–34.5)
MCV RBC AUTO: 81.5 FL (ref 80–99.9)
METER GLUCOSE: 186 MG/DL (ref 74–99)
METER GLUCOSE: 196 MG/DL (ref 74–99)
METER GLUCOSE: 210 MG/DL (ref 74–99)
METER GLUCOSE: 255 MG/DL (ref 74–99)
ORGANISM: ABNORMAL
PDW BLD-RTO: 15 FL (ref 11.5–15)
PLATELET # BLD: 279 E9/L (ref 130–450)
PMV BLD AUTO: 9.9 FL (ref 7–12)
POTASSIUM SERPL-SCNC: 4.1 MMOL/L (ref 3.5–5)
RBC # BLD: 3.25 E12/L (ref 3.8–5.8)
SODIUM BLD-SCNC: 134 MMOL/L (ref 132–146)
URINE CULTURE, ROUTINE: ABNORMAL
WBC # BLD: 8.6 E9/L (ref 4.5–11.5)

## 2021-02-17 PROCEDURE — 6360000002 HC RX W HCPCS: Performed by: PODIATRIST

## 2021-02-17 PROCEDURE — 1200000000 HC SEMI PRIVATE

## 2021-02-17 PROCEDURE — 36415 COLL VENOUS BLD VENIPUNCTURE: CPT

## 2021-02-17 PROCEDURE — 93306 TTE W/DOPPLER COMPLETE: CPT

## 2021-02-17 PROCEDURE — 2580000003 HC RX 258: Performed by: PODIATRIST

## 2021-02-17 PROCEDURE — 6370000000 HC RX 637 (ALT 250 FOR IP): Performed by: PODIATRIST

## 2021-02-17 PROCEDURE — 82962 GLUCOSE BLOOD TEST: CPT

## 2021-02-17 PROCEDURE — 2700000000 HC OXYGEN THERAPY PER DAY

## 2021-02-17 PROCEDURE — 99232 SBSQ HOSP IP/OBS MODERATE 35: CPT | Performed by: INTERNAL MEDICINE

## 2021-02-17 PROCEDURE — 6370000000 HC RX 637 (ALT 250 FOR IP): Performed by: INTERNAL MEDICINE

## 2021-02-17 PROCEDURE — 85027 COMPLETE CBC AUTOMATED: CPT

## 2021-02-17 PROCEDURE — 80048 BASIC METABOLIC PNL TOTAL CA: CPT

## 2021-02-17 RX ADMIN — CYCLOBENZAPRINE 10 MG: 10 TABLET, FILM COATED ORAL at 23:25

## 2021-02-17 RX ADMIN — CEFEPIME 2000 MG: 2 INJECTION, POWDER, FOR SOLUTION INTRAMUSCULAR; INTRAVENOUS at 18:15

## 2021-02-17 RX ADMIN — Medication 9 MG: at 22:20

## 2021-02-17 RX ADMIN — CLOPIDOGREL BISULFATE 75 MG: 75 TABLET ORAL at 11:31

## 2021-02-17 RX ADMIN — ASPIRIN 81 MG: 81 TABLET, COATED ORAL at 11:30

## 2021-02-17 RX ADMIN — ENOXAPARIN SODIUM 40 MG: 40 INJECTION SUBCUTANEOUS at 11:39

## 2021-02-17 RX ADMIN — ATORVASTATIN CALCIUM 40 MG: 40 TABLET, FILM COATED ORAL at 22:20

## 2021-02-17 RX ADMIN — OXYCODONE 5 MG: 5 TABLET ORAL at 11:30

## 2021-02-17 RX ADMIN — OXYCODONE 5 MG: 5 TABLET ORAL at 22:20

## 2021-02-17 RX ADMIN — PANTOPRAZOLE SODIUM 40 MG: 40 TABLET, DELAYED RELEASE ORAL at 06:54

## 2021-02-17 RX ADMIN — CYCLOBENZAPRINE 10 MG: 10 TABLET, FILM COATED ORAL at 11:30

## 2021-02-17 RX ADMIN — SODIUM CHLORIDE: 9 INJECTION, SOLUTION INTRAVENOUS at 22:23

## 2021-02-17 RX ADMIN — INSULIN LISPRO 3 UNITS: 100 INJECTION, SOLUTION INTRAVENOUS; SUBCUTANEOUS at 18:33

## 2021-02-17 RX ADMIN — SODIUM CHLORIDE 100 ML/HR: 9 INJECTION, SOLUTION INTRAVENOUS at 03:15

## 2021-02-17 RX ADMIN — Medication 10 ML: at 22:24

## 2021-02-17 RX ADMIN — INSULIN GLARGINE 8 UNITS: 100 INJECTION, SOLUTION SUBCUTANEOUS at 22:24

## 2021-02-17 RX ADMIN — INSULIN LISPRO 3 UNITS: 100 INJECTION, SOLUTION INTRAVENOUS; SUBCUTANEOUS at 18:10

## 2021-02-17 RX ADMIN — SODIUM CHLORIDE: 9 INJECTION, SOLUTION INTRAVENOUS at 11:41

## 2021-02-17 RX ADMIN — OXYCODONE 5 MG: 5 TABLET ORAL at 18:15

## 2021-02-17 RX ADMIN — INSULIN LISPRO 2 UNITS: 100 INJECTION, SOLUTION INTRAVENOUS; SUBCUTANEOUS at 12:09

## 2021-02-17 RX ADMIN — CEFEPIME 2000 MG: 2 INJECTION, POWDER, FOR SOLUTION INTRAMUSCULAR; INTRAVENOUS at 06:54

## 2021-02-17 RX ADMIN — INSULIN LISPRO 3 UNITS: 100 INJECTION, SOLUTION INTRAVENOUS; SUBCUTANEOUS at 12:09

## 2021-02-17 RX ADMIN — OXYCODONE 5 MG: 5 TABLET ORAL at 06:53

## 2021-02-17 RX ADMIN — INSULIN LISPRO 1 UNITS: 100 INJECTION, SOLUTION INTRAVENOUS; SUBCUTANEOUS at 22:24

## 2021-02-17 RX ADMIN — CYCLOBENZAPRINE 10 MG: 10 TABLET, FILM COATED ORAL at 18:14

## 2021-02-17 RX ADMIN — VANCOMYCIN HYDROCHLORIDE 1250 MG: 10 INJECTION, POWDER, LYOPHILIZED, FOR SOLUTION INTRAVENOUS at 11:39

## 2021-02-17 RX ADMIN — CYCLOBENZAPRINE 10 MG: 10 TABLET, FILM COATED ORAL at 06:54

## 2021-02-17 RX ADMIN — HYDROCHLOROTHIAZIDE 25 MG: 25 TABLET ORAL at 11:31

## 2021-02-17 RX ADMIN — VANCOMYCIN HYDROCHLORIDE 1250 MG: 10 INJECTION, POWDER, LYOPHILIZED, FOR SOLUTION INTRAVENOUS at 22:20

## 2021-02-17 RX ADMIN — Medication 9 MG: at 23:25

## 2021-02-17 ASSESSMENT — PAIN SCALES - GENERAL
PAINLEVEL_OUTOF10: 6
PAINLEVEL_OUTOF10: 5

## 2021-02-17 NOTE — CONSULTS
Riccardo London is a 63 y.o. male with the following history:    Past Medical History:   Diagnosis Date    [facility]erebral [facility]     Diabetes mellitus ([facility]     Type 2    Hemiparesis affecting left side as late effect of cerebrovascular accident ([facility]     LEFT SIDE NON DOMINANT FOLLOWING STROKE    Hypertension      Consult requested for lt heel ulcer  Past Surgical History:   Procedure Laterality Date    FINGER AMPUTATION      FOOT DEBRIDEMENT Left 2/16/2021    LEFT FOOT DEBRIDEMENT WITH BONE BIOPSY, WOUND VAC APPLICATION performed by Artemio Jacob DPM at [place] [address] ARTHROSCOPY      TONSILLECTOMY      UPPER GASTROINTESTINAL ENDOSCOPY N/A 1/4/2021    EGD BIOPSY performed by Miguel Angel Dewitt DO at [address] [place] [address] reviewed. No pertinent family history. Prior to Admission medications    Medication Sig Start Date End Date Taking?  Authorizing Provider   vitamin D (ERGOCALCIFEROL) 1.25 MG (99439 UT) CAPS capsule Take 1 capsule by mouth once a week 1/12/21  Yes Kyle Correia MD   ipratropium-albuterol (DUONEB) 0.5-2.5 (3) MG/3ML SOLN nebulizer solution Inhale 3 mLs into the lungs every 4 hours 1/5/21  Yes Kyle Correia MD   pantoprazole (PROTONIX) 40 MG tablet Take 1 tablet by mouth every morning (before breakfast) 1/6/21  Yes Kyle Correia MD   melatonin 3 MG TABS tablet Take 9 mg by mouth nightly as needed   Yes Historical Provider, MD   aspirin 81 MG EC tablet Take 81 mg by mouth daily   Yes Historical Provider, MD   hydroCHLOROthiazide (HYDRODIURIL) 25 MG tablet Take 25 mg by mouth daily   Yes Historical Provider, MD   glimepiride (AMARYL) 2 MG tablet Take 1 tablet by mouth daily (with breakfast) 11/16/15  Yes Ester Cordon DO   insulin lispro (HUMALOG) 100 UNIT/ML injection vial Inject 0-12 Units into the skin 3 times daily (with meals) 11/16/15  Yes Ester Cordon DO The MAHOGANY on the left is 0.93. Pressures in the right upper and lower thigh could not be measured due to   heart noncompressible vessels.  Pressure in the left upper thigh is 167 mmHg   with decreased to 97 mmHg in the lower thigh.  This could be associated with   significant stenosis.  Left calf pressure could not be measured due to heart   noncompressible vessels.  Right calf pressure is 150 mmHg. Indices for the right lower extremity: Right toe brachial index is 0.29   Indices for the left lower extremity:  Left toe brachial index is 0.28   Doppler waveforms are triphasic at the bilateral common femoral and   superficial femoral level and right popliteal level.  Biphasic waveforms are   seen at the left popliteal and bilateral posterior tibial and dorsalis pedis. Pulse volume recordings are normal at the upper thigh and lower thigh level   bilaterally, moderately blunted at the calf and ankle level and more severely   blunted at the metatarsal level, greater on the left and at bilateral digital   level. The digital waveforms show some artifact but are otherwise grossly   unremarkable.       Impression   1. Significant pressure gradient at the left thigh level that could be   associated with significant stenosis. 2. Normal right ankle brachial index at 1.01.  The left MAHOGANY is mildly   decreased at 0.93.  Bilateral decreased toe brachial indices is 0.29 on the   right and 0.28 on the left. 3. Abnormal PVRs at the metatarsal level, greater on the left and digital   level.  Findings could be related to significant distal small vessel disease.    4. Correlation with imaging such as doppler ultrasound or CT angiography   could be considered.           Assessment/Plan:    Lt heel ulcer  Abnormal arterial studies  Will get cta abdo and runoffs

## 2021-02-17 NOTE — PROGRESS NOTES
Room #:  3490/9859-90    Date: 2021       Patient Name: Delfina Gale  : 1960      MRN: [id number]     Patient unavailable for physical therapy treatment due to pt out of room 10:11 am. Yvetta Pallas PTA  LIC# [id number]

## 2021-02-17 NOTE — PROGRESS NOTES
· Pharmacist will follow and monitor/adjust dosing as necessary      Thank you for the consult,    Virginia Vargas, PharmD, BCPS 2/17/2021 11:29 AM   Ext: [phone]

## 2021-02-17 NOTE — PROGRESS NOTES
Foot and Ankle Surgery  Progress Note    History:  Patient seen bedside s/p left heel excisional debridement through level of deep fascia / muscle, I&D of left foot abscess, bone biopsy, and wound vac application (DOS 1/56). No acute events overnight. Patient denies any N/V/D/F/C/SOB/CP. No other complaints at this time. Past Medical History:   Diagnosis Date    [facility]bral [facility] occlusion [facility] infarction [facility]     Diabetes mellitus ([facility]     Type 2    Hemiparesis affecting left side as late effect of cerebrovascular accident ([facility]     LEFT SIDE NON DOMINANT FOLLOWING STROKE    Hypertension      Past Surgical History:   Procedure Laterality Date    FINGER AMPUTATION      FOOT DEBRIDEMENT Left 2/16/2021    LEFT FOOT DEBRIDEMENT WITH BONE BIOPSY, WOUND VAC APPLICATION performed by Artemio Jacob DPM at [place] [address] ARTHROSCOPY      TONSILLECTOMY      UPPER GASTROINTESTINAL ENDOSCOPY N/A 1/4/2021    EGD BIOPSY performed by Miguel Angel Dewitt DO at [place] [address] reviewed. No pertinent family history. Allergies   Allergen Reactions    Pcn [Penicillins]        Objective  Vitals:    02/16/21 1742 02/17/21 0010 02/17/21 0200 02/17/21 0538   BP: 130/69  137/62 (!) 141/68   Pulse: 65  61 76   Resp: 18  18 18   Temp: 97.8 °F (36.6 °C)  97.5 °F (36.4 °C) 97.9 °F (36.6 °C)   TempSrc: Oral  Oral    SpO2: 95% 96%     Weight:       Height:         Physical Exam:  NEUROLOGICAL EXAMINATION: Protective sensation absent to bilateral lower extremities. Epicritic sensation is intact. VASCULAR EXAMINATION:  DP and PT pulses are weakly palpable +1. Capillary refill is less than five seconds at the toes. Skin temperature is warm proximal to distal, slightly warmer on the left side secondary to the infection. There is absence of hair growth noted bilaterally. DERMATOLOGICAL:  There is full-thickness ulceration in the posterior left heel measuring approximately 4.5x6x0.5cm. Does probe to bone post-operatively. There is mild sanguinous drainage in canister. Moderate surrounding erythema. No malodor, purulence, or fluctuance. On the right heel, there appears to be granular wound, appears to be stable. No signs of infection. Mild erythema noted. MUSCULOSKELETAL:  Muscle strength is 3/5 to the foot and leg muscle groups on the left side, 4/5 on the right. Assessment:  1. S/p left heel excisional debridement through level of deep fascia / muscle, I&D of left foot abscess, bone biopsy, and wound vac application (DOS 5/24)  2. Left heel wound - POA, infected  3. Acute OM  4. Left foot cellulitis  5. Bacteremia  6. PAD  7. IDDM with neuropathy  8. Right heel wound - Stable, POA, not infected  9. UTI    Plan:  - No further plans for surgical intervention this admission. Will resume local wound care via wound vac and abx therapy. - Abx per at ID team discretion. Vanc, cefepime now. Will follow sx cx and path to narrow abx. UTI? Bacteremia.  - Offloading of heel, very important. Must use prevalon boots and/or float heels with padding under the legs. - Dressings: Left heel - wound vac, as per orders, can perform first change Friday (2/19). Right heel - betadine with mepilex QOD.  - PT/OT if able. - NWB to L LE. Can WB to R LE in surgical shoe for transfers only. - Patient stable for DC from foot and ankle POV once final abx regimen able to be established.     DW: Lelia Mayen DPM  Fellowship-Trained Foot and Ankle Surgeon  [phone]

## 2021-02-17 NOTE — PROGRESS NOTES
[facility]  [facility]  Progress Note    NAME: Stacy Silva  MR:  [id number]  :   1960  DATE OF SERVICE:21    This is a face to face encounter with Stacy Silva 63 y.o. male on 21  ID following for   Chief Complaint   Patient presents with    Fever     102.2 at [facility], 101.7 for EMS    Wound Check     BILAT HEELS X FEW WEEKS     SUBJECTIVE:  Pt in bed has vacc - questions answered   No issues overnight  Had CTA this am   Patient is tolerating medications. No reported adverse drug reactions. Review of systems:  As stated above in the chief complaint, otherwise negative. Medications:  Scheduled Meds:   insulin lispro  3 Units Subcutaneous TID WC    insulin glargine  8 Units Subcutaneous Nightly    sodium chloride flush  10 mL Intravenous 2 times per day    enoxaparin  40 mg Subcutaneous Daily    atorvastatin  40 mg Oral Nightly    clopidogrel  75 mg Oral Daily    aspirin  81 mg Oral Daily    hydroCHLOROthiazide  25 mg Oral Daily    ipratropium-albuterol  3 mL Inhalation Q4H    pantoprazole  40 mg Oral QAM AC    vancomycin  1,250 mg Intravenous Q12H    cefepime  2,000 mg Intravenous Q12H    insulin lispro  0-6 Units Subcutaneous TID WC    insulin lispro  0-3 Units Subcutaneous Nightly     Continuous Infusions:   sodium chloride 100 mL/hr (21 0315)    sodium chloride 12.5 mL/hr at 21 1141    dextrose       PRN Meds:iopamidol, oxyCODONE, cyclobenzaprine, sodium chloride flush, promethazine **OR** ondansetron, polyethylene glycol, acetaminophen **OR** acetaminophen, melatonin, glucose, dextrose, glucagon (rDNA), dextrose    OBJECTIVE:  BP (!) 141/68   Pulse 76   Temp 97.9 °F (36.6 °C)   Resp 18   Ht 5' 11\" (1.803 m)   Wt 182 lb (82.6 kg)   SpO2 96%   BMI 25.38 kg/m²   Temp  Av.7 °F (36.5 °C)  Min: 97.5 °F (36.4 °C)  Max: 97.9 °F (36.6 °C)  Constitutional:  The patient is awake, alert, and oriented. positive cocci in clusters  Previously positive blood culture called      BC 5 Days no growth 12/26/2020    BLOODCULT2  02/14/2021     Gram stain performed from blood culture bottle media  Gram positive cocci in clusters      BLOODCULT2 Sensitivity to follow 02/14/2021    BLOODCULT2 5 Days no growth 12/26/2020    ORG Proteus mirabilis 02/14/2021    ORG Staphylococcus aureus 02/14/2021     WOUND/ABSCESS   Date Value Ref Range Status   02/14/2021   Preliminary    Growth present, evaluating for:  Mixed Gram positive organisms  Proteus species       Smear, Respiratory   Date Value Ref Range Status   01/01/2021   Final    Group 6: <25 PMN's/LPF and <25 Epithelial cells/LPF  Rare Polymorphonuclear leukocytes  Epithelial cells not seen  No organisms seen       No results found for: MPNEUMO, CLAMYDCU, LABLEGI, AFBCX, FUNGSM, LABFUNG    MRSA Culture Only   Date Value Ref Range Status   12/26/2020 Methicillin resistant Staph aureus not isolated  Final     CULTURE, RESPIRATORY   Date Value Ref Range Status   01/01/2021 Oral Pharyngeal Hannah absent  Growth not present    Final     Recent Labs     02/14/21 2059 02/14/21 2059 02/16/21  0732   CXSURG  --    < > Growth present, evaluating for:  Gram positive cocci     ORG Proteus mirabilis*  --   --     < > = values in this interval not displayed.      Recent Labs     02/14/21  1907 02/14/21 2059   WNDABS  --  Growth present, evaluating for:  Mixed Gram positive organisms  Proteus species     ORG Staphylococcus aureus* Proteus mirabilis*        ASSESSMENT:  MSSA bacteremia   BILATERAL HEEL PRESSURE ULCERS   NECROTIC WOUND, OSTEOMYELITIS, Cellulitis left foot  S/p LEFT FOOT DEBRIDEMENT WITH BONE BIOPSY, WOUND VAC APPLICATION  UTI  - proteus   2/14 wound cx Mixed Gram positive organisms   Proteus species       Plan:     vancomycin (VANCOCIN) 1,250 mg in dextrose 5 % 250 mL IVPB, Q12H pharmacy dosing - for now    cefepime (MAXIPIME) 2000 mg IVPB extended (mini-bag), Q12H- for now · Check ECHO  · Repeat blood cx- pending   · Will cont with atbx await final cx- bone cx with GPC  · Monitor labs  · Will need PICC once blood cultures are sterile     Imaging and labs were reviewed per medical records. The patient was educated about the diagnosis, prognosis, indications, risks and benefits of treatment. An opportunity to ask questions was given to the patient/FAMILY. Thank you for involving me in the care of Stan Reza I will continue to follow. Please do not hesitate to call for any questions or concerns. Electronically signed by ALBERT Cuenca on 2/17/2021 at 1:22 PM   As above    This is a face to face encounter with Stan Reza on 02/17/21. I have performed and participated in the history, exam, medical decision making, and  POC  with the NURSE PRACTITIONER and provided the instruction and education regarding this patient's care. Imaging and labs were reviewed per medical records and any ID pertinent labs were addressed with the patient. The patient was educated about the diagnosis, prognosis, indications, risks and benefits of treatment. Pt in bed had no questions informed pt POC  MSSA septicemia   Clinical Notes:  Procedure:  Left foot debridement with bony biopsy,   wound vac application   Preoperative Dx:   Necrotic wound, osteomyelitis   Proteus uti  complicate foot infection  Picc/atbx 6-8 weeks  Echo   Pt had the opportunity to ask questions. All questions were answered. Thank you for involving me in the care of Stan Reza. Please do not hesitate to call for any questions or concerns.     Electronically signed by Teri Major MD on 2/17/2021 at 3:18 PM    Phone [phone]  Fax [phone]

## 2021-02-17 NOTE — PROGRESS NOTES
Room #:  2043/0828-53    Date: 2021       Patient Name: Riccardo London  : 1960      MRN: [id number]     Patient unavailable for physical therapy treatment due to pt out of the room .   Rick Rather PTA  LIC# [id number]       [id number], PTA

## 2021-02-17 NOTE — FLOWSHEET NOTE
Inpatient Wound Care    Admit Date: 2/14/2021  6:32 PM    Reason for consult:  Bilateral heels    Significant history:    Past Medical History:   Diagnosis Date    Cerebral artery occlusion with [facility] infarction ([facility]     Diabetes mellitus ([facility]     Type 2    Hemiparesis affecting left side as late effect of cerebrovascular accident ([facility]     LEFT SIDE NON DOMINANT FOLLOWING STROKE    Hypertension        Wound history:      Findings:       02/17/21 1343   Wound 02/15/21 Heel Right   Date First Assessed/Time First Assessed: 02/15/21 0008   Location: Heel  Wound Location Orientation: Right   Wound Etiology Pressure Stage  3   Wound Length (cm) 1.3 cm   Wound Width (cm) 1.2 cm   Wound Depth (cm) 0.3 cm   Wound Surface Area (cm^2) 1.56 cm^2   Change in Wound Size % (l*w) 48   Wound Volume (cm^3) 0.47 cm^3   Wound Healing % 84   Wound Assessment Slough; Other (Comment)  (red)   Drainage Amount Scant   Drainage Description Serosanguinous   Odor None       Impression:  Stage 3 pressure injury    Interventions in place:  Betadine and mepilex dressing    Plan:  Cont treatment  Wound vac is intact has serosang drainage  Plan for vac change on friday      Ancil Monday 2/17/2021 1:45 PM

## 2021-02-17 NOTE — PROGRESS NOTES
Date:2021  Patient Name: Elba Degroot  MRN: [id number]  : 1960  ROOM #: 0336/0336-02     (Two attempts today)    Occupational Therapy order received, chart reviewed and evaluation attempted this date. Patient is unavailable for OT evaluation due to being off the unit for a scan this morning and an echo this afternoon. Will attempt OT evaluation at a later time. Thank you.    Honey Chaudhary OTR/L #[id number]

## 2021-02-17 NOTE — PLAN OF CARE
Problem: Pain:  Goal: Pain level will decrease  Description: Pain level will decrease  2/17/2021 1357 by Verito Robles RN  Outcome: Met This Shift     Problem: Pain:  Goal: Control of acute pain  Description: Control of acute pain  2/17/2021 1357 by Verito Robles RN  Outcome: Met This Shift     Problem: Pain:  Goal: Control of chronic pain  Description: Control of chronic pain  2/17/2021 1357 by Verito Robles RN  Outcome: Met This Shift     Problem: Falls - Risk of:  Goal: Will remain free from falls  Description: Will remain free from falls  2/17/2021 1357 by Verito Robles RN  Outcome: Met This Shift     Problem: Falls - Risk of:  Goal: Absence of physical injury  Description: Absence of physical injury  2/17/2021 1357 by Verito Robles RN  Outcome: Met This Shift     Problem: Skin Integrity:  Goal: Absence of new skin breakdown  Description: Absence of new skin breakdown  2/17/2021 1357 by Verito Robles RN  Outcome: Met This Shift     Problem: Serum Glucose Level - Abnormal:  Goal: Ability to maintain appropriate glucose levels will improve  Description: Ability to maintain appropriate glucose levels will improve  Outcome: Met This Shift     Problem: Skin Integrity:  Goal: Will show no infection signs and symptoms  Description: Will show no infection signs and symptoms  2/17/2021 1357 by Verito Robles RN  Outcome: Not Met This Shift

## 2021-02-17 NOTE — PROGRESS NOTES
[address] [place], [place] [address]ist   Progress Note    Admitting Date and Time: 2/14/2021  6:32 PM  Admit Dx: Diabetic foot infection ([address] [E11.628, L08.9]    Subjective/interval history:    Pt feels well, postoperative pain well controlled. Urine cultures growing Proteus mirabilis sensitive to his current antibiotic regimen, initial 2 sets of blood cultures pending final result, repeat blood culture showing no growth at this time, surgical culture showing gram-positive cocci. He had peripheral arterial studies that showed abnormal PVRs at the metatarsal level, concerning for distal small vessel disease. Vascular surgery was consulted and CTA with runoff was ordered. Per RN: Unable to place secure peripheral access that will be sufficient for CTA. ROS: denies fever, chills, cp, sob, n/v, HA unless stated above.      insulin glargine  8 Units Subcutaneous Nightly    sodium chloride flush  10 mL Intravenous 2 times per day    enoxaparin  40 mg Subcutaneous Daily    atorvastatin  40 mg Oral Nightly    clopidogrel  75 mg Oral Daily    aspirin  81 mg Oral Daily    hydroCHLOROthiazide  25 mg Oral Daily    ipratropium-albuterol  3 mL Inhalation Q4H    pantoprazole  40 mg Oral QAM AC    vancomycin  1,250 mg Intravenous Q12H    cefepime  2,000 mg Intravenous Q12H    insulin lispro  0-6 Units Subcutaneous TID WC    insulin lispro  0-3 Units Subcutaneous Nightly         iopamidol, 110 mL, ONCE PRN      oxyCODONE, 5 mg, Q4H PRN      cyclobenzaprine, 10 mg, TID PRN      sodium chloride flush, 10 mL, PRN      promethazine, 12.5 mg, Q6H PRN    Or      ondansetron, 4 mg, Q6H PRN      polyethylene glycol, 17 g, Daily PRN      acetaminophen, 650 mg, Q6H PRN    Or      acetaminophen, 650 mg, Q6H PRN      melatonin, 9 mg, Nightly PRN      glucose, 15 g, PRN      dextrose, 12.5 g, PRN      glucagon (rDNA), 1 mg, PRN      dextrose, 100 mL/hr, PRN         Objective: BP (!) 141/68   Pulse 76   Temp 97.9 °F (36.6 °C)   Resp 18   Ht 5' 11\" (1.803 m)   Wt 182 lb (82.6 kg)   SpO2 96%   BMI 25.38 kg/m²   General Appearance: alert and oriented to person, place and time and in no acute distress  Skin: warm and dry  Head: normocephalic and atraumatic  Eyes: pupils equal, round, and reactive to light, extraocular eye movements intact, conjunctivae normal  Neck: neck supple and non tender without mass   Pulmonary/Chest: clear to auscultation bilaterally- no wheezes, rales or rhonchi, normal air movement, no respiratory distress  Cardiovascular: normal rate, normal S1 and S2 and no carotid bruits  Abdomen: soft, non-tender, non-distended, normal bowel sounds, no masses or organomegaly  Extremities: Left foot and postoperative dressing.  No cyanosis, no clubbing and no edema  Neurologic: no cranial nerve deficit and speech normal      Recent Labs     02/14/21 1907 02/16/21 0515 02/17/21  0523   * 132 134   K 4.4 3.8 4.1   CL 96* 101 102   CO2 20* 21* 21*   BUN 30* 16 16   CREATININE 0.8 0.7 0.6*   GLUCOSE 257* 87 176*   CALCIUM 9.3 8.5* 9.0       Recent Labs     02/14/21 1907 02/16/21  0515   ALKPHOS 134* 87   PROT 7.8 6.2*   LABALBU 3.7 2.7*   BILITOT 0.6 0.5   AST 32 26   ALT 22 19       Recent Labs     02/14/21 1907 02/16/21 0515 02/17/21  0523   WBC 8.0 6.5 8.6   RBC 3.72* 3.18* 3.25*   HGB 9.6* 8.2* 8.3*   HCT 30.7* 26.2* 26.5*   MCV 82.5 82.4 81.5   MCH 25.8* 25.8* 25.5*   MCHC 31.3* 31.3* 31.3*   RDW 15.3* 15.3* 15.0    259 279   MPV 9.6 9.5 9.9       CBC:   Lab Results   Component Value Date    WBC 8.6 02/17/2021    RBC 3.25 02/17/2021    HGB 8.3 02/17/2021    HCT 26.5 02/17/2021    MCV 81.5 02/17/2021    MCH 25.5 02/17/2021    MCHC 31.3 02/17/2021    RDW 15.0 02/17/2021     02/17/2021    MPV 9.9 02/17/2021     BMP:    Lab Results   Component Value Date     02/17/2021    K 4.1 02/17/2021    K 3.8 02/16/2021     02/17/2021 CO2 21 02/17/2021    BUN 16 02/17/2021    LABALBU 2.7 02/16/2021    CREATININE 0.6 02/17/2021    CALCIUM 9.0 02/17/2021    GFRAA >60 02/17/2021    LABGLOM >60 02/17/2021    GLUCOSE 176 02/17/2021        Radiology:   VL LOWER EXTREMITY ARTERIAL SEGMENTAL PRESSURES W PPG BILATERAL   Final Result   1. Significant pressure gradient at the left thigh level that could be   associated with significant stenosis. 2. Normal right ankle brachial index at 1.01. The left MAHOGANY is mildly   decreased at 0.93. Bilateral decreased toe brachial indices is 0.29 on the   right and 0.28 on the left. 3. Abnormal PVRs at the metatarsal level, greater on the left and digital   level. Findings could be related to significant distal small vessel disease. 4. Correlation with imaging such as doppler ultrasound or CT angiography   could be considered. XR FOOT LEFT (2 VIEWS)   Final Result   No acute osseous abnormality. No definitive evidence for osteomyelitis. The osseous structures are osteopenic. XR CHEST PORTABLE   Final Result   No pneumonia or pleural effusion. CTA ABDOMINAL AORTA W BILAT RUNOFF W CONTRAST    (Results Pending)       Assessment/Plan:  Principal Problem:    Staphylococcus aureus bacteremia  Active Problems:    Diabetic foot infection ([address]    Hypertension    Hemiparesis affecting left side as late effect of cerebrovascular accident [facility])    UTI (urinary tract infection)  Resolved Problems:    * No resolved hospital problems. *      1. Staph aureus bacteremia  -Currently on vancomycin and cefepime. Infectious disease following  -Source is likely diabetic foot infection, surgical cultures are now growing gram-positive cocci as well. Bone biopsy was taken in the operating room today with result pending.     2.  Diabetic foot infection/bilateral lower extremity cellulitis  -continue vancomycin and cefepime  -Post wound debridement and bone biopsy left foot  2/16  -Bone biopsy pathology pending 3.  Uncontrolled type 2 diabetes mellitus  -Started basal insulin and corrective scale while inpatient 2/15 for goal blood glucose between 120 and 180  -Blood glucose this morning 176, improved from previous day, however glucose during the day is remain elevated. We will add Humalog 3 units meals. 4.  UTI  -on coverage with cefepime  -Urine culture growing Proteus mirabilis, sensitive to current antibiotic regimen     5. History of stroke  -Continue aspirin and statin    6. Abnormal PVRs  -CTA of the abdominal aorta with runoff ordered, however nursing staff unable to place an IV that is sufficient for this due to the fact the patient is a difficult stick. Patient will need a PICC line for long-term antibiotics. Once PICC line is placed (after repeat blood cultures negative), we can proceed with CTA. DVT prophylaxis: Subcutaneous enoxaparin  Full code    Discussed with RN     NOTE: This report was transcribed using voice recognition software. Every effort was made to ensure accuracy; however, inadvertent computerized transcription errors may be present.      Electronically signed by Steven Sen DO on 2/17/2021 at 11:51 AM

## 2021-02-18 LAB
ANAEROBIC CULTURE: NORMAL
ANION GAP SERPL CALCULATED.3IONS-SCNC: 10 MMOL/L (ref 7–16)
BASOPHILS ABSOLUTE: 0.04 E9/L (ref 0–0.2)
BASOPHILS RELATIVE PERCENT: 0.5 % (ref 0–2)
BUN BLDV-MCNC: 14 MG/DL (ref 8–23)
CALCIUM SERPL-MCNC: 8.8 MG/DL (ref 8.6–10.2)
CHLORIDE BLD-SCNC: 101 MMOL/L (ref 98–107)
CO2: 23 MMOL/L (ref 22–29)
CREAT SERPL-MCNC: 0.7 MG/DL (ref 0.7–1.2)
CULTURE SURGICAL: ABNORMAL
EOSINOPHILS ABSOLUTE: 0.15 E9/L (ref 0.05–0.5)
EOSINOPHILS RELATIVE PERCENT: 1.8 % (ref 0–6)
GFR AFRICAN AMERICAN: >60
GFR NON-AFRICAN AMERICAN: >60 ML/MIN/1.73
GLUCOSE BLD-MCNC: 135 MG/DL (ref 74–99)
HCT VFR BLD CALC: 26.7 % (ref 37–54)
HEMOGLOBIN: 8.2 G/DL (ref 12.5–16.5)
IMMATURE GRANULOCYTES #: 0.04 E9/L
IMMATURE GRANULOCYTES %: 0.5 % (ref 0–5)
LYMPHOCYTES ABSOLUTE: 2.46 E9/L (ref 1.5–4)
LYMPHOCYTES RELATIVE PERCENT: 29.4 % (ref 20–42)
MCH RBC QN AUTO: 25.5 PG (ref 26–35)
MCHC RBC AUTO-ENTMCNC: 30.7 % (ref 32–34.5)
MCV RBC AUTO: 83.2 FL (ref 80–99.9)
METER GLUCOSE: 146 MG/DL (ref 74–99)
METER GLUCOSE: 179 MG/DL (ref 74–99)
METER GLUCOSE: 202 MG/DL (ref 74–99)
METER GLUCOSE: 216 MG/DL (ref 74–99)
MONOCYTES ABSOLUTE: 0.66 E9/L (ref 0.1–0.95)
MONOCYTES RELATIVE PERCENT: 7.9 % (ref 2–12)
NEUTROPHILS ABSOLUTE: 5.01 E9/L (ref 1.8–7.3)
NEUTROPHILS RELATIVE PERCENT: 59.9 % (ref 43–80)
ORGANISM: ABNORMAL
PDW BLD-RTO: 15.1 FL (ref 11.5–15)
PLATELET # BLD: 289 E9/L (ref 130–450)
PMV BLD AUTO: 9.6 FL (ref 7–12)
POTASSIUM SERPL-SCNC: 3.9 MMOL/L (ref 3.5–5)
RBC # BLD: 3.21 E12/L (ref 3.8–5.8)
SODIUM BLD-SCNC: 134 MMOL/L (ref 132–146)
WBC # BLD: 8.4 E9/L (ref 4.5–11.5)
WOUND/ABSCESS: NORMAL

## 2021-02-18 PROCEDURE — 85025 COMPLETE CBC W/AUTO DIFF WBC: CPT

## 2021-02-18 PROCEDURE — 97110 THERAPEUTIC EXERCISES: CPT

## 2021-02-18 PROCEDURE — 97530 THERAPEUTIC ACTIVITIES: CPT

## 2021-02-18 PROCEDURE — C1751 CATH, INF, PER/CENT/MIDLINE: HCPCS

## 2021-02-18 PROCEDURE — 2580000003 HC RX 258: Performed by: PODIATRIST

## 2021-02-18 PROCEDURE — 6370000000 HC RX 637 (ALT 250 FOR IP): Performed by: INTERNAL MEDICINE

## 2021-02-18 PROCEDURE — 6360000002 HC RX W HCPCS: Performed by: PODIATRIST

## 2021-02-18 PROCEDURE — 36569 INSJ PICC 5 YR+ W/O IMAGING: CPT

## 2021-02-18 PROCEDURE — 36592 COLLECT BLOOD FROM PICC: CPT

## 2021-02-18 PROCEDURE — 6370000000 HC RX 637 (ALT 250 FOR IP): Performed by: PODIATRIST

## 2021-02-18 PROCEDURE — 2500000003 HC RX 250 WO HCPCS: Performed by: SPECIALIST

## 2021-02-18 PROCEDURE — 2580000003 HC RX 258: Performed by: SPECIALIST

## 2021-02-18 PROCEDURE — 2720000010 HC SURG SUPPLY STERILE

## 2021-02-18 PROCEDURE — 76937 US GUIDE VASCULAR ACCESS: CPT

## 2021-02-18 PROCEDURE — 1200000000 HC SEMI PRIVATE

## 2021-02-18 PROCEDURE — 99232 SBSQ HOSP IP/OBS MODERATE 35: CPT | Performed by: INTERNAL MEDICINE

## 2021-02-18 PROCEDURE — 02HV33Z INSERTION OF INFUSION DEVICE INTO SUPERIOR VENA CAVA, PERCUTANEOUS APPROACH: ICD-10-PCS | Performed by: INTERNAL MEDICINE

## 2021-02-18 PROCEDURE — 97165 OT EVAL LOW COMPLEX 30 MIN: CPT

## 2021-02-18 PROCEDURE — 80048 BASIC METABOLIC PNL TOTAL CA: CPT

## 2021-02-18 PROCEDURE — 82962 GLUCOSE BLOOD TEST: CPT

## 2021-02-18 PROCEDURE — 36415 COLL VENOUS BLD VENIPUNCTURE: CPT

## 2021-02-18 RX ORDER — SODIUM CHLORIDE 0.9 % (FLUSH) 0.9 %
10 SYRINGE (ML) INJECTION PRN
Status: DISCONTINUED | OUTPATIENT
Start: 2021-02-18 | End: 2021-02-23 | Stop reason: HOSPADM

## 2021-02-18 RX ORDER — SODIUM CHLORIDE, SODIUM LACTATE, POTASSIUM CHLORIDE, CALCIUM CHLORIDE 600; 310; 30; 20 MG/100ML; MG/100ML; MG/100ML; MG/100ML
INJECTION, SOLUTION INTRAVENOUS CONTINUOUS
Status: DISCONTINUED | OUTPATIENT
Start: 2021-02-18 | End: 2021-02-23 | Stop reason: HOSPADM

## 2021-02-18 RX ORDER — SODIUM CHLORIDE 0.9 % (FLUSH) 0.9 %
10 SYRINGE (ML) INJECTION EVERY 12 HOURS SCHEDULED
Status: DISCONTINUED | OUTPATIENT
Start: 2021-02-18 | End: 2021-02-23 | Stop reason: HOSPADM

## 2021-02-18 RX ORDER — LIDOCAINE HYDROCHLORIDE 10 MG/ML
5 INJECTION, SOLUTION INFILTRATION; PERINEURAL ONCE
Status: COMPLETED | OUTPATIENT
Start: 2021-02-18 | End: 2021-02-18

## 2021-02-18 RX ADMIN — Medication 9 MG: at 21:04

## 2021-02-18 RX ADMIN — CEFEPIME 2000 MG: 2 INJECTION, POWDER, FOR SOLUTION INTRAMUSCULAR; INTRAVENOUS at 06:24

## 2021-02-18 RX ADMIN — LIDOCAINE HYDROCHLORIDE 5 ML: 10 INJECTION, SOLUTION INFILTRATION; PERINEURAL at 16:07

## 2021-02-18 RX ADMIN — HYDROCHLOROTHIAZIDE 25 MG: 25 TABLET ORAL at 09:36

## 2021-02-18 RX ADMIN — INSULIN LISPRO 3 UNITS: 100 INJECTION, SOLUTION INTRAVENOUS; SUBCUTANEOUS at 11:34

## 2021-02-18 RX ADMIN — CLOPIDOGREL BISULFATE 75 MG: 75 TABLET ORAL at 09:36

## 2021-02-18 RX ADMIN — SODIUM CHLORIDE, PRESERVATIVE FREE 10 ML: 5 INJECTION INTRAVENOUS at 21:18

## 2021-02-18 RX ADMIN — INSULIN LISPRO 2 UNITS: 100 INJECTION, SOLUTION INTRAVENOUS; SUBCUTANEOUS at 11:34

## 2021-02-18 RX ADMIN — CYCLOBENZAPRINE 10 MG: 10 TABLET, FILM COATED ORAL at 21:04

## 2021-02-18 RX ADMIN — INSULIN GLARGINE 8 UNITS: 100 INJECTION, SOLUTION SUBCUTANEOUS at 21:04

## 2021-02-18 RX ADMIN — VANCOMYCIN HYDROCHLORIDE 1250 MG: 10 INJECTION, POWDER, LYOPHILIZED, FOR SOLUTION INTRAVENOUS at 21:17

## 2021-02-18 RX ADMIN — INSULIN LISPRO 1 UNITS: 100 INJECTION, SOLUTION INTRAVENOUS; SUBCUTANEOUS at 21:03

## 2021-02-18 RX ADMIN — SODIUM CHLORIDE: 9 INJECTION, SOLUTION INTRAVENOUS at 10:38

## 2021-02-18 RX ADMIN — INSULIN LISPRO 1 UNITS: 100 INJECTION, SOLUTION INTRAVENOUS; SUBCUTANEOUS at 16:57

## 2021-02-18 RX ADMIN — VANCOMYCIN HYDROCHLORIDE 1250 MG: 10 INJECTION, POWDER, LYOPHILIZED, FOR SOLUTION INTRAVENOUS at 09:38

## 2021-02-18 RX ADMIN — OXYCODONE 5 MG: 5 TABLET ORAL at 06:24

## 2021-02-18 RX ADMIN — ENOXAPARIN SODIUM 40 MG: 40 INJECTION SUBCUTANEOUS at 09:36

## 2021-02-18 RX ADMIN — ASPIRIN 81 MG: 81 TABLET, COATED ORAL at 09:36

## 2021-02-18 RX ADMIN — PANTOPRAZOLE SODIUM 40 MG: 40 TABLET, DELAYED RELEASE ORAL at 06:24

## 2021-02-18 RX ADMIN — OXYCODONE 5 MG: 5 TABLET ORAL at 16:08

## 2021-02-18 RX ADMIN — CEFEPIME 2000 MG: 2 INJECTION, POWDER, FOR SOLUTION INTRAMUSCULAR; INTRAVENOUS at 18:57

## 2021-02-18 RX ADMIN — SODIUM CHLORIDE: 9 INJECTION, SOLUTION INTRAVENOUS at 22:59

## 2021-02-18 RX ADMIN — OXYCODONE 5 MG: 5 TABLET ORAL at 21:04

## 2021-02-18 RX ADMIN — INSULIN LISPRO 3 UNITS: 100 INJECTION, SOLUTION INTRAVENOUS; SUBCUTANEOUS at 17:00

## 2021-02-18 RX ADMIN — ATORVASTATIN CALCIUM 40 MG: 40 TABLET, FILM COATED ORAL at 21:04

## 2021-02-18 NOTE — PROGRESS NOTES
Physical Therapy Treatment Note    Room #:  0336/0336-02  Patient Name: Jesus Manuel Stone  YOB: 1960  MRN: [id number]    Referring Provider:   Damian Morse DPM     Date of Service: 2/18/2021    Evaluating Physical Therapist: Óscar Toledo, PT #[id number]       Diagnosis:   Diabetic foot infection ([address] [E11.628, L08.9]    bilateral heel wounds     Patient Active Problem List   Diagnosis    CVA (cerebral vascular accident) ([address]    Diabetes mellitus ([address]    Hyponatremia    Diabetic foot infection ([address]    Hypertension    Hemiparesis affecting left side as late effect of cerebrovascular accident ([address]    UTI (urinary tract infection)    Staphylococcus aureus bacteremia        Tentative placement recommendation: Subacute rehab    Equipment recommendation: To be determined      Prior Level of Function: Patient ambulated independently and with hemicane at therapy otherwise in wheelchair    Rehab Potential: fair    for baseline    Past medical history:   Past Medical History:   Diagnosis Date    Cerebral artery occlusion with cerebral infarction ([address]     Diabetes mellitus ([address]     Type 2    Hemiparesis affecting left side as late effect of cerebrovascular accident ([address]     LEFT SIDE NON DOMINANT FOLLOWING STROKE    Hypertension      Past Surgical History:   Procedure Laterality Date    FINGER AMPUTATION      FOOT DEBRIDEMENT Left 2/16/2021    LEFT FOOT DEBRIDEMENT WITH BONE BIOPSY, WOUND VAC APPLICATION performed by Damian Morse DPM at [place] [address] ARTHROSCOPY      TONSILLECTOMY      UPPER GASTROINTESTINAL ENDOSCOPY N/A 1/4/2021    EGD BIOPSY performed by Liat Gibson DO at [address] [place] [address] ENDOSCOPY       Precautions: Bedrest with bathroom Privileges , falls and alarm ,  history of cva with redisual Left upper extremitiy hemiparesis non weight bearing Left lower extremity  Wound vac, surgical shoe right foot.   S/p LEFT FOOT DEBRIDEMENT WITH BONE BIOPSY, WOUND VAC APPLICATION  SUBJECTIVE: Social history: Patient lives in a skilled nursing facility reports has been there ~ 3 weeks  Prior lives with mother wheelchair  Bound has nurse comes in to help and brother 59 yo has MS bedbound in basement has caregiver  in a ranch home  with 3 steps  to enter to kitchen with [address] 3 steps to bed/bath, to basement 12 steps with rail    Equipment owned: Wheelchair, [employer], [employer], Bedside commode and Shower chair,       [address] cleared patient for PT treatment. . pt agrees to therapy and sitting at side of bed       OBJECTIVE;   Initial Evaluation  Date: 2/16/2021 Treatment Date:  2/18/2021     Short Term/ Long Term   Goals   Was pt agreeable to Eval/treatment? Yes   yes To be met in 3 days   Pain level   4/10  Left foot Not rated     Bed Mobility    Rolling: Supervision     Supine to sit: Minimal assist of 1    Sit to supine: Minimal assist of 1    Scooting: Minimal assist of 1   Rolling: Supervision     Supine to sit: Minimal assist of 1    Sit to supine: Minimal assist of 1    Scooting: Minimal assist of 2 toward head of bed using chux for support. Cues for technique and non weight bearing left lower extremity   Rolling: Independent    Supine to sit: Independent    Sit to supine: Independent    Scooting: Independent     Transfers Sit to stand: attempted, patient unable to maintain non weight bearing  Left lower extremity  Will require assist of 2 Sit to stand: attempted, patient unable to maintain non weight bearing  Left lower extremity  With assist of two and pt using oralia walker right upper extremity     Sit to stand:  Moderate assist of 1 non weight bearing Left lower extremity    Ambulation    not assessed     3 feet using  hemiwalker with Moderate assist of 1 non weight bearing Left lower extremity    Stair negotiation: ascended and descended   Not assessed ROM Within functional limits except Left upper extremitiy due to history cva and Left lower extremity wound vac left foot limited active knee extension due weakness     Increase range of motion 10% of affected joints    Strength BUE:  refer to OT eval  RLE:  3+/5  LLE:  not assessed    Increase strength in affected mm groups by 1/3 grade   Balance Sitting EOB:  good -  Dynamic Standing:  not assessed   Sitting EOB: good -  Dynamic Standing: not assessed     Sitting EOB:  good    Dynamic Standing: fair hemiwalker     Patient is Alert & Oriented x person, place, time and situation and follows directions    Sensation:  Patient  reports numbness and tingling left lower extremity    Edema:  yes left lower extremity    Endurance: fair  +    Vitals: room air    Blood Pressure at rest   Blood Pressure during session     Heart Rate at rest   Heart Rate during session     SPO2 at rest  %  SPO2 during session  %     Patient education  Patient educated on role of Physical Therapy, risks of immobility, safety and plan of care,  importance of mobility while in hospital , weight bearing status  and positioning for skin integrity and comfort non weight bearing Left lower extremity   Patient was educated and facilitated on techniques to increase safety and independence with bed mobility, balance, functional transfers, and functional mobility. Patient was explained the the benefits of mobility and risks of immobility.     Patient response to education:   Pt verbalized understanding Pt demonstrated skill Pt requires further education in this area   Yes Partial Yes Treatment:  Patient practiced and was instructed/facilitated in the following treatment: Patient assisted to [facility]  Sat edge of bed 20 minutes with Supervision  to increase dynamic sitting balance and activity tolerance. Attempted stance with assist of two with pt unable to safely achieve full stance and maintain non weight bearing status  Returned to bed, Maximal assist of  2 to scoot to Head of bed in trendelenburg  . Performed ex      Therapeutic Exercises:  ankle pumps, heel slide and hip abduction/adduction ,slr x 15-20 reps        Patient required co-treat with occupational therapy due to the level of physical assistance needed and the patients endurance level to tolerate separate sessions. At end of session, patient in bed with alarm call light and phone within reach,   all lines and tubes intact, nursing notified. Patient would benefit from continued skilled Physical Therapy to improve functional independence and quality of life. Patient's/ family goals   home when able      ASSESSMENT: Patient exhibits decreased strength, balance, endurance and pain left foot impairing functional mobility, transfers, gait  and tolerance to activity non weight bearing Left lower extremity with wound vac   Pt unable to perform sit to stand transfers and maintain non weight bearing left lower extremity.  Willing to participate with therapy throughout  Plan of Care:     -Bed Mobility: Lower extremity exercises  and Upper extremity exercises   -Sitting Balance: Facilitate active trunk muscle engagement  and Facilitate postural control in all planes   -Standing Balance: Perform sit to stand activities maintaining non weightbearing left lower extremity  -Transfers: Provide instruction on proper hand and right lower extremity placement to maintain non weight bearing left lower extremity   -Gait: Gait training and Use of Assistive device for non weight bearing left lower extremity -Endurance: Utilize Supervised activities to increase level of endurance to allow for safe functional mobility including transfers and gait     Patient and or family understand(s) diagnosis, prognosis, and plan of care. Frequency of treatments: Patient will be seen  daily.        Time in  850  Time out  925    Total Treatment Time  35minutes        CPT codes:    Therapeutic activities (88018)   15 minutes  1 unit(s)  Therapeutic exercises (31284)   10 minutes  1 unit(s)  Non billable time 10 minutes    Rodrigue [place], [place] [address]

## 2021-02-18 NOTE — PROGRESS NOTES
Pharmacy Consultation Note  (Antibiotic Dosing and Monitoring)    Initial consult date: 2/15/2021  Consulting physician: Dr. Dori Ying  Drug(s): Vancomycin  Indication: SSTI    Ht Readings from Last 1 Encounters:   02/15/21 5' 11\" (1.803 m)     Wt Readings from Last 1 Encounters:   02/15/21 182 lb (82.6 kg)     Age/  Gender IBW DW  Allergy Information   63 y.o.   male 75.3 kg 82.6 kg  Pcn [penicillins]          Date  Tmax WBC BUN/CR UOP  (mL/kg/hr) Drug/Dose Time   Given Level(s)   (Time) Comments   2/14  (#1) 99.6 8 30/0.8 -- Vancomycin 1250 mg IV x 1 2142     2/15  (#2) afebrile -- -- -- Vancomycin 1250 mg IV q12hr 1154  2215     2/16  (#3) afebrile 6.5 16/0.7 -- Vancomycin 1250 mg IV q12hr 1018  2245 Trough @ 2038 = 14.7 mcg/mL    2/17  (#4) afebrile 8.6 16/0.6 -- Vancomycin 1250 mg IV q12hr 1139  2220     2/18  (#5) afebrile 8.4 14/0.7 -- Vancomycin 1250 mg IV q12hr 0938  <2200>       (#6)             (#7)             Estimated Creatinine Clearance: 118 mL/min (based on SCr of 0.7 mg/dL). UOP over the past 24 hours:       Intake/Output Summary (Last 24 hours) at 2/18/2021 1113  Last data filed at 2/18/2021 0850  Gross per 24 hour   Intake 720 ml   Output    Net 720 ml     Other anti-infectives: Anti-infective Dose Date Initiated Date Stopped   Cefepime 2g IV q12hr 2/14      Cultures:  available culture and sensitivity results were reviewed in EPIC  Cultures sent and are pending.   Culture Date Result    Blood cx 1 2/14 GPC in clusters   Blood cx 2 2/14 GPC in clusters   Urine cx 2/14 >100,000 CFU/mL Proteus mirabilis   Wound cx 2/14 Growth present, evaluating for: Mixed GPO, Proteus species   Surgical cx  (Tissue) 2/16 Light growth: Proteus mirabilis  Moderate growth: E faecalis  Moderate growth: S aureus   Surgical cx  (Bone) 2/16 Rare growth: E faecalis   Anaerobic cx  (Tissue) 2/16 Growth present: evaluating for anaerobes   Anaerobic cx  (Bone) 2/16 Anaerobes not isolated               Assessment: · Consulted by Dr. Tyrell Araya to dose/monitor vancomycin  · Goal trough level:  15-20 mcg/mL  · Pt is a 64 y/o male who presented from home diabetic foot infection and cellulitis.   · Serum creatinine today: 0.7 mg/dL; CrCl ~ 103 mL/min; baseline Scr ~ 0.8 mg/dL  · 2/16: Trough = 14.7 mcg/mL    Plan:  · Vancomycin 1250 mg IV q12hr  · Follow renal function  · Pharmacist will follow and monitor/adjust dosing as necessary      Thank you for the consult,    Anam Burns, PharmD, BCPS 2/18/2021 11:13 AM   Ext: [phone]

## 2021-02-18 NOTE — PROGRESS NOTES
Eyes: pupils equal, round, and reactive to light, extraocular eye movements intact, conjunctivae normal  Neck: neck supple and non tender without mass   Pulmonary/Chest: clear to auscultation bilaterally- no wheezes, rales or rhonchi, normal air movement, no respiratory distress  Cardiovascular: normal rate, normal S1 and S2 and no carotid bruits  Abdomen: soft, non-tender, non-distended, normal bowel sounds, no masses or organomegaly  Extremities: Left foot and postoperative dressing.  No cyanosis, no clubbing and no edema  Neurologic: no cranial nerve deficit and speech normal      Recent Labs     02/16/21  0515 02/17/21  0523 02/18/21  0444    134 134   K 3.8 4.1 3.9    102 101   CO2 21* 21* 23   BUN 16 16 14   CREATININE 0.7 0.6* 0.7   GLUCOSE 87 176* 135*   CALCIUM 8.5* 9.0 8.8       Recent Labs     02/16/21 0515   ALKPHOS 87   PROT 6.2*   LABALBU 2.7*   BILITOT 0.5   AST 26   ALT 19       Recent Labs     02/16/21  0515 02/17/21  0523 02/18/21  0444   WBC 6.5 8.6 8.4   RBC 3.18* 3.25* 3.21*   HGB 8.2* 8.3* 8.2*   HCT 26.2* 26.5* 26.7*   MCV 82.4 81.5 83.2   MCH 25.8* 25.5* 25.5*   MCHC 31.3* 31.3* 30.7*   RDW 15.3* 15.0 15.1*    279 289   MPV 9.5 9.9 9.6       CBC:   Lab Results   Component Value Date    WBC 8.4 02/18/2021    RBC 3.21 02/18/2021    HGB 8.2 02/18/2021    HCT 26.7 02/18/2021    MCV 83.2 02/18/2021    MCH 25.5 02/18/2021    MCHC 30.7 02/18/2021    RDW 15.1 02/18/2021     02/18/2021    MPV 9.6 02/18/2021     BMP:    Lab Results   Component Value Date     02/18/2021    K 3.9 02/18/2021    K 3.8 02/16/2021     02/18/2021    CO2 23 02/18/2021    BUN 14 02/18/2021    LABALBU 2.7 02/16/2021    CREATININE 0.7 02/18/2021    CALCIUM 8.8 02/18/2021    GFRAA >60 02/18/2021    LABGLOM >60 02/18/2021    GLUCOSE 135 02/18/2021        Radiology:   VASCULAR REPORT   Final Result      VL LOWER EXTREMITY ARTERIAL SEGMENTAL PRESSURES W PPG BILATERAL   Final Result 1. Significant pressure gradient at the left thigh level that could be   associated with significant stenosis. 2. Normal right ankle brachial index at 1.01. The left MAHOGANY is mildly   decreased at 0.93. Bilateral decreased toe brachial indices is 0.29 on the   right and 0.28 on the left. 3. Abnormal PVRs at the metatarsal level, greater on the left and digital   level. Findings could be related to significant distal small vessel disease. 4. Correlation with imaging such as doppler ultrasound or CT angiography   could be considered. XR FOOT LEFT (2 VIEWS)   Final Result   No acute osseous abnormality. No definitive evidence for osteomyelitis. The osseous structures are osteopenic. XR CHEST PORTABLE   Final Result   No pneumonia or pleural effusion. Assessment/Plan:  Principal Problem:    Staphylococcus aureus bacteremia  Active Problems:    Diabetic foot infection ([address]    Hypertension    Hemiparesis affecting left side as late effect of cerebrovascular accident [facility])    UTI (urinary tract infection)  Resolved Problems:    * No resolved hospital problems. *      1. Staph aureus bacteremia  -Currently on vancomycin and cefepime. Infectious disease following  -He also has Enterococcus faecalis growing from surgical wounds sensitive to current antibiotic coverage.  -Bone biopsy negative for osteomyelitis    2. Diabetic foot infection/bilateral lower extremity cellulitis  -continue vancomycin and cefepime  -Post wound debridement and bone biopsy left foot today 2/16  -Surgical cultures show Enterococcus faecalis growth  -Bone biopsy negative for osteomyelitis    3. Uncontrolled type 2 diabetes mellitus  -On basal insulin, prandial insulin, and corrective scale with reasonable blood glucose control. Adjust as needed    4. UTI  -Urine culture growing greater than 100,000 CFU/mL of Proteus mirabilis, sensitive to current antibiotic coverage    5.   History of stroke -Continue aspirin and statin    DVT prophylaxis: Subcutaneous enoxaparin  Full code    NOTE: This report was transcribed using voice recognition software. Every effort was made to ensure accuracy; however, inadvertent computerized transcription errors may be present.      Electronically signed by Pardeep Mahmood DO on 2/18/2021 at 11:47 AM

## 2021-02-18 NOTE — CARE COORDINATION
2-18-Cm note: ( no covid testing) Pt is scheduled for Angiogram in am with Dr. Devin Salvador. Pt's dc plan remains the same, return to [facility] when stable . Pt will require Covid test prior to dc, precert is waived.   Electronically signed by Milady Jackson RN on 2/18/2021 at 10:39 AM

## 2021-02-18 NOTE — PROGRESS NOTES
There is mild sanguinous drainage in canister. Moderate surrounding erythema. No malodor, purulence, or fluctuance. On the right heel, a granular wound, appears to be stable. Measures approximately 2cm in diameter and down to subQ tissue. No signs of infection. Mild erythema noted. MUSCULOSKELETAL:  Muscle strength is 3/5 to the foot and leg muscle groups on the left side, 4/5 on the right. No pain on compression of calves. No clinical signs of DVT. No pain on palpation of heels. Micro / Path:  Recent Labs     02/16/21  0730 02/16/21  0732 02/16/21  1637   BC  --   --  24 Hours no growth   ORG Proteus mirabilis*  Enterococcus faecalis*  Staphylococcus aureus* Enterococcus faecalis*  --    CXSURG Growth present, evaluating for:  Proteus species  Mixed Gram positive organisms  *  Light growth  Sensitivity to follow    Moderate growth  Refer to previous culture for susceptibility results  of CXSUR FOOT (Bone) collected 02/16/2021 at 07:32    Moderate growth  Sensitivity to follow   Rare growth  --        Assessment:  1. S/p left heel excisional debridement through level of deep fascia / muscle, I&D of left foot abscess, bone biopsy, and wound vac application (DOS 1/58)  2. Left heel wound - POA, infected - resolving  3. Acute OM - biopsy negative for OM  4. Left foot cellulitis  5. Bacteremia  6. PAD - CTA planned johann  7. IDDM with neuropathy  8. Right heel wound - Stable, POA, not infected  9. UTI - Proteus -> Echo    Plan:  - Vascular sx following. Will follow with you - agram planned. Unable to do CTA due to poor IV access. - No further plans for surgical intervention this admission. Will resume local wound care via wound vac and abx therapy. - Abx per at ID team discretion. Vanc, cefepime now. 6-8 weeks, PICC. Echo. - Offloading of heel, very important. Must use offloading boots and/or float heels with padding under the legs.   - Dressings: Left heel - wound vac, as per orders, can perform first change Friday (2/19). Right heel - betadine with mepilex QOD.  - PT/OT if able. - NWB to L LE. Can WB to R LE in surgical shoe for transfers only. - Patient stable for DC from foot and ankle POV once vascular assessment / intervention complete and final abx regimen able to be established. - Follow up with Dr. Jaye Rudd in clinic within 1 week of discharge for further care.   - Will follow while in hospital    DW: Dhruv Reynolds DPM  Fellowship-Trained Foot and Ankle Surgeon  [phone]

## 2021-02-18 NOTE — PROGRESS NOTES
No complaints  vss  Exam unchanged  Unable to do cta due to poor IV access  Will cancel cta and proceed to angiogram in am  Risks accepted by patient

## 2021-02-18 NOTE — PROGRESS NOTES
[facility]  [facility]  Progress Note    NAME: Hansel Willoughby  MR:  [id number]  :   1960  DATE OF SERVICE:21    This is a face to face encounter with Hansel Willoughby 63 y.o. male on 21  ID following for   Chief Complaint   Patient presents with    [facility]7 for [facility]    Wound Check     BILAT HEELS X FEW WEEKS     SUBJECTIVE:  Pt in bed has vacc -   No issues overnight     Patient is tolerating medications. No reported adverse drug reactions. Review of systems:  As stated above in the chief complaint, otherwise negative. Medications:  Scheduled Meds:   insulin lispro  3 Units Subcutaneous TID WC    insulin glargine  8 Units Subcutaneous Nightly    sodium chloride flush  10 mL Intravenous 2 times per day    enoxaparin  40 mg Subcutaneous Daily    atorvastatin  40 mg Oral Nightly    clopidogrel  75 mg Oral Daily    aspirin  81 mg Oral Daily    hydroCHLOROthiazide  25 mg Oral Daily    ipratropium-albuterol  3 mL Inhalation Q4H    pantoprazole  40 mg Oral QAM AC    vancomycin  1,250 mg Intravenous Q12H    cefepime  2,000 mg Intravenous Q12H    insulin lispro  0-6 Units Subcutaneous TID WC    insulin lispro  0-3 Units Subcutaneous Nightly     Continuous Infusions:   lactated ringers      sodium chloride 12.5 mL/hr at 21 1038    dextrose       PRN Meds:iopamidol, oxyCODONE, cyclobenzaprine, sodium chloride flush, promethazine **OR** ondansetron, polyethylene glycol, acetaminophen **OR** acetaminophen, melatonin, glucose, dextrose, glucagon (rDNA), dextrose    OBJECTIVE:  /69   Pulse 82   Temp 97.3 °F (36.3 °C) (Oral)   Resp 18   Ht 5' 11\" (1.803 m)   Wt 182 lb (82.6 kg)   SpO2 94%   BMI 25.38 kg/m²   Temp  Av.5 °F (36.4 °C)  Min: 97.3 °F (36.3 °C)  Max: 97.9 °F (36.6 °C)  Constitutional:  The patient is awake   Skin:    Warm and dry.      HEENT:      AT/NC Chest:   No use of accessory muscles to breathe. Symmetrical expansion. cta ant   Cardiovascular:  S1 and S2 are rhythmic and regular. Abdomen:   Positive bowel sounds to auscultation. Benign to palpation. Extremities:   No clubbing, no cyanosis, no edema. Right vacc  CNS    AAxO   Lines: piv    Radiology:  Laboratory and Tests Review:  Lab Results   Component Value Date    WBC 8.4 02/18/2021    WBC 8.6 02/17/2021    WBC 6.5 02/16/2021    HGB 8.2 (L) 02/18/2021    HCT 26.7 (L) 02/18/2021    MCV 83.2 02/18/2021     02/18/2021     No results found for: CRPHS  Lab Results   Component Value Date    ALT 19 02/16/2021    AST 26 02/16/2021    ALKPHOS 87 02/16/2021    BILITOT 0.5 02/16/2021     Lab Results   Component Value Date     02/18/2021    K 3.9 02/18/2021    K 3.8 02/16/2021     02/18/2021    CO2 23 02/18/2021    BUN 14 02/18/2021    CREATININE 0.7 02/18/2021    CREATININE 0.6 02/17/2021    CREATININE 0.7 02/16/2021    GFRAA >60 02/18/2021    LABGLOM >60 02/18/2021    GLUCOSE 135 02/18/2021    PROT 6.2 02/16/2021    LABALBU 2.7 02/16/2021    CALCIUM 8.8 02/18/2021    BILITOT 0.5 02/16/2021    ALKPHOS 87 02/16/2021    AST 26 02/16/2021    ALT 19 02/16/2021     Lab Results   Component Value Date    CRP 2.3 (H) 02/14/2021     Lab Results   Component Value Date    SEDRATE 87 (H) 02/14/2021     Recent Labs     02/16/21  0515   AST 26   ALT 19     Lab Results   Component Value Date    CHOL 164 03/01/2016    TRIG 170 03/01/2016    HDL 43 03/01/2016    LDLCALC 87 03/01/2016    LABVLDL 34 03/01/2016     Lab Results   Component Value Date/Time    VITD25 12 (L) 12/28/2020 09:15 AM       Microbiology:   No results for input(s): COVID19 in the last 72 hours.   Lab Results   Component Value Date    BC 24 Hours no growth 02/16/2021    BC  02/14/2021     24 Hours no growth  Gram stain performed from blood culture bottle media  Gram positive cocci in clusters  Previously positive blood culture called BC 5 Days no growth 12/26/2020    BLOODCULT2 24 Hours no growth 02/16/2021    BLOODCULT2  02/14/2021     Gram stain performed from blood culture bottle media  Gram positive cocci in clusters      BLOODCULT2  02/14/2021     Sensitivity to follow  This isolate is presumed to be resistant based on the  detection of inducible Clindamycin resistance. Clindamycin  may still be effective in some patients. ORG Enterococcus faecalis 02/16/2021    ORG Proteus mirabilis 02/16/2021    ORG Enterococcus faecalis 02/16/2021    ORG Staphylococcus aureus 02/16/2021     WOUND/ABSCESS   Date Value Ref Range Status   02/14/2021   Final    Mixed laura isolated. Further workup and sensitivity testing  is not routinely indicated and will not be performed. Mixed laura isolated includes:  Proteus species  Oxidase positive Gram negative rods  Nonhemolytic Strep species       Smear, Respiratory   Date Value Ref Range Status   01/01/2021   Final    Group 6: <25 PMN's/LPF and <25 Epithelial cells/LPF  Rare Polymorphonuclear leukocytes  Epithelial cells not seen  No organisms seen       No results found for: MPNEUMO, CLAMYDCU, LABLEGI, AFBCX, FUNGSM, LABFUNG    MRSA Culture Only   Date Value Ref Range Status   12/26/2020 Methicillin resistant Staph aureus not isolated  Final     CULTURE, RESPIRATORY   Date Value Ref Range Status   01/01/2021 Oral Pharyngeal Laura absent  Growth not present    Final     Recent Labs     02/16/21  0732   CXSURG Rare growth   ORG Enterococcus faecalis*     Recent Labs     02/16/21  0730 02/16/21  0732   ORG Proteus mirabilis*  Enterococcus faecalis*  Staphylococcus aureus* Enterococcus faecalis*        ASSESSMENT:  MSSA bacteremia   BILATERAL HEEL PRESSURE ULCERS   NECROTIC WOUND, OSTEOMYELITIS, Cellulitis left foot  S/p LEFT FOOT DEBRIDEMENT WITH BONE BIOPSY, WOUND VAC APPLICATION  Bone biopsy left heel: Medullary bone, negative for osteomyelitis.       UTI  - proteus 2/14 wound cx Mixed Gram positive organisms   Proteus species   TTE Summary    Normal left ventricular size and systolic function.    Ejection fraction is visually estimated at 60-65%.    Indeterminate diastolic function.    No regional wall motion abnormalities seen.    Mild left ventricular concentric hypertrophy noted.    Normal right ventricular size and function.    The left atrium is mildly dilated.    Aortic valve leaflets are moderately calcified.    Aortic leaflets are thickened, likely due to degenerative changes, however    a small vegetation can not be excluded.    Mild aortic valve regurgitation.    No vegetations seen otherwise. LYRIC IS RECOMMENDED    Plan:      for angio  For LYRIC  Cont atbx  picc      vancomycin (VANCOCIN) 1,250 mg in dextrose 5 % 250 mL IVPB, Q12H      cefepime (MAXIPIME) 2000 mg IVPB extended (mini-bag), Q12H switch to unasyn         Imaging and labs were reviewed per medical records. Pt had the opportunity to ask questions. All questions were answered. Thank you for involving me in the care of [facility]. Please do not hesitate to call for any questions or concerns.     Electronically signed by Chau Goodman MD on 2/18/2021 at 12:08 PM    Phone [phone]  Fax [phone]

## 2021-02-19 ENCOUNTER — APPOINTMENT (OUTPATIENT)
Dept: INTERVENTIONAL RADIOLOGY/VASCULAR | Age: 61
DRG: 622 | End: 2021-02-19
Payer: MEDICARE

## 2021-02-19 PROBLEM — I73.9 PERIPHERAL ARTERIAL DISEASE (HCC): Status: ACTIVE | Noted: 2021-02-19

## 2021-02-19 LAB
ANAEROBIC CULTURE: ABNORMAL
ANION GAP SERPL CALCULATED.3IONS-SCNC: 10 MMOL/L (ref 7–16)
BUN BLDV-MCNC: 15 MG/DL (ref 8–23)
CALCIUM SERPL-MCNC: 9 MG/DL (ref 8.6–10.2)
CHLORIDE BLD-SCNC: 99 MMOL/L (ref 98–107)
CO2: 24 MMOL/L (ref 22–29)
CREAT SERPL-MCNC: 0.5 MG/DL (ref 0.7–1.2)
GFR AFRICAN AMERICAN: >60
GFR NON-AFRICAN AMERICAN: >60 ML/MIN/1.73
GLUCOSE BLD-MCNC: 99 MG/DL (ref 74–99)
HCT VFR BLD CALC: 26.2 % (ref 37–54)
HEMOGLOBIN: 8.5 G/DL (ref 12.5–16.5)
MCH RBC QN AUTO: 26.2 PG (ref 26–35)
MCHC RBC AUTO-ENTMCNC: 32.4 % (ref 32–34.5)
MCV RBC AUTO: 80.6 FL (ref 80–99.9)
METER GLUCOSE: 109 MG/DL (ref 74–99)
METER GLUCOSE: 144 MG/DL (ref 74–99)
METER GLUCOSE: 155 MG/DL (ref 74–99)
METER GLUCOSE: 180 MG/DL (ref 74–99)
ORGANISM: ABNORMAL
PDW BLD-RTO: 15 FL (ref 11.5–15)
PLATELET # BLD: 308 E9/L (ref 130–450)
PMV BLD AUTO: 9.5 FL (ref 7–12)
POC ACT LR: 193 SECONDS
POC ACT LR: 242 SECONDS
POTASSIUM SERPL-SCNC: 3.9 MMOL/L (ref 3.5–5)
RBC # BLD: 3.25 E12/L (ref 3.8–5.8)
SODIUM BLD-SCNC: 133 MMOL/L (ref 132–146)
WBC # BLD: 10.4 E9/L (ref 4.5–11.5)

## 2021-02-19 PROCEDURE — 047N3ZZ DILATION OF LEFT POPLITEAL ARTERY, PERCUTANEOUS APPROACH: ICD-10-PCS | Performed by: THORACIC SURGERY (CARDIOTHORACIC VASCULAR SURGERY)

## 2021-02-19 PROCEDURE — 37224 HC FEM POP TERRITORY PLASTY: CPT | Performed by: THORACIC SURGERY (CARDIOTHORACIC VASCULAR SURGERY)

## 2021-02-19 PROCEDURE — 82962 GLUCOSE BLOOD TEST: CPT

## 2021-02-19 PROCEDURE — 80048 BASIC METABOLIC PNL TOTAL CA: CPT

## 2021-02-19 PROCEDURE — 6360000004 HC RX CONTRAST MEDICATION: Performed by: THORACIC SURGERY (CARDIOTHORACIC VASCULAR SURGERY)

## 2021-02-19 PROCEDURE — 2580000003 HC RX 258: Performed by: INTERNAL MEDICINE

## 2021-02-19 PROCEDURE — 6370000000 HC RX 637 (ALT 250 FOR IP): Performed by: INTERNAL MEDICINE

## 2021-02-19 PROCEDURE — U0003 INFECTIOUS AGENT DETECTION BY NUCLEIC ACID (DNA OR RNA); SEVERE ACUTE RESPIRATORY SYNDROME CORONAVIRUS 2 (SARS-COV-2) (CORONAVIRUS DISEASE [COVID-19]), AMPLIFIED PROBE TECHNIQUE, MAKING USE OF HIGH THROUGHPUT TECHNOLOGIES AS DESCRIBED BY CMS-2020-01-R: HCPCS

## 2021-02-19 PROCEDURE — 6360000002 HC RX W HCPCS: Performed by: PODIATRIST

## 2021-02-19 PROCEDURE — 2580000003 HC RX 258: Performed by: PODIATRIST

## 2021-02-19 PROCEDURE — 6370000000 HC RX 637 (ALT 250 FOR IP)

## 2021-02-19 PROCEDURE — 85347 COAGULATION TIME ACTIVATED: CPT

## 2021-02-19 PROCEDURE — 36415 COLL VENOUS BLD VENIPUNCTURE: CPT

## 2021-02-19 PROCEDURE — 99232 SBSQ HOSP IP/OBS MODERATE 35: CPT | Performed by: INTERNAL MEDICINE

## 2021-02-19 PROCEDURE — C1769 GUIDE WIRE: HCPCS

## 2021-02-19 PROCEDURE — 36592 COLLECT BLOOD FROM PICC: CPT

## 2021-02-19 PROCEDURE — 6360000002 HC RX W HCPCS: Performed by: THORACIC SURGERY (CARDIOTHORACIC VASCULAR SURGERY)

## 2021-02-19 PROCEDURE — 6370000000 HC RX 637 (ALT 250 FOR IP): Performed by: SPECIALIST

## 2021-02-19 PROCEDURE — 6370000000 HC RX 637 (ALT 250 FOR IP): Performed by: PODIATRIST

## 2021-02-19 PROCEDURE — B41G1ZZ FLUOROSCOPY OF LEFT LOWER EXTREMITY ARTERIES USING LOW OSMOLAR CONTRAST: ICD-10-PCS | Performed by: THORACIC SURGERY (CARDIOTHORACIC VASCULAR SURGERY)

## 2021-02-19 PROCEDURE — 85027 COMPLETE CBC AUTOMATED: CPT

## 2021-02-19 PROCEDURE — 1200000000 HC SEMI PRIVATE

## 2021-02-19 PROCEDURE — 047L3ZZ DILATION OF LEFT FEMORAL ARTERY, PERCUTANEOUS APPROACH: ICD-10-PCS | Performed by: THORACIC SURGERY (CARDIOTHORACIC VASCULAR SURGERY)

## 2021-02-19 RX ORDER — MIDAZOLAM HYDROCHLORIDE 1 MG/ML
INJECTION INTRAMUSCULAR; INTRAVENOUS
Status: COMPLETED | OUTPATIENT
Start: 2021-02-19 | End: 2021-02-19

## 2021-02-19 RX ORDER — HEPARIN SODIUM 5000 [USP'U]/ML
INJECTION, SOLUTION INTRAVENOUS; SUBCUTANEOUS
Status: COMPLETED | OUTPATIENT
Start: 2021-02-19 | End: 2021-02-19

## 2021-02-19 RX ORDER — FENTANYL CITRATE 50 UG/ML
INJECTION, SOLUTION INTRAMUSCULAR; INTRAVENOUS
Status: COMPLETED | OUTPATIENT
Start: 2021-02-19 | End: 2021-02-19

## 2021-02-19 RX ORDER — IODIXANOL 320 MG/ML
120 INJECTION, SOLUTION INTRAVASCULAR
Status: COMPLETED | OUTPATIENT
Start: 2021-02-19 | End: 2021-02-19

## 2021-02-19 RX ORDER — ERGOCALCIFEROL 1.25 MG/1
50000 CAPSULE ORAL WEEKLY
Status: DISCONTINUED | OUTPATIENT
Start: 2021-02-19 | End: 2021-02-23 | Stop reason: HOSPADM

## 2021-02-19 RX ADMIN — ERGOCALCIFEROL 50000 UNITS: 1.25 CAPSULE ORAL at 15:36

## 2021-02-19 RX ADMIN — OXYCODONE 5 MG: 5 TABLET ORAL at 16:34

## 2021-02-19 RX ADMIN — INSULIN LISPRO 1 UNITS: 100 INJECTION, SOLUTION INTRAVENOUS; SUBCUTANEOUS at 17:29

## 2021-02-19 RX ADMIN — INSULIN GLARGINE 8 UNITS: 100 INJECTION, SOLUTION SUBCUTANEOUS at 20:20

## 2021-02-19 RX ADMIN — HEPARIN SODIUM 1000 UNITS: 5000 INJECTION, SOLUTION INTRAVENOUS; SUBCUTANEOUS at 10:20

## 2021-02-19 RX ADMIN — SODIUM CHLORIDE, POTASSIUM CHLORIDE, SODIUM LACTATE AND CALCIUM CHLORIDE: 600; 310; 30; 20 INJECTION, SOLUTION INTRAVENOUS at 20:37

## 2021-02-19 RX ADMIN — SODIUM CHLORIDE: 9 INJECTION, SOLUTION INTRAVENOUS at 20:49

## 2021-02-19 RX ADMIN — Medication 9 MG: at 20:37

## 2021-02-19 RX ADMIN — IODIXANOL 120 ML: 320 INJECTION, SOLUTION INTRAVASCULAR at 11:04

## 2021-02-19 RX ADMIN — HEPARIN SODIUM 2000 UNITS: 5000 INJECTION, SOLUTION INTRAVENOUS; SUBCUTANEOUS at 10:41

## 2021-02-19 RX ADMIN — MIDAZOLAM 1 MG: 1 INJECTION INTRAMUSCULAR; INTRAVENOUS at 09:56

## 2021-02-19 RX ADMIN — SODIUM CHLORIDE, POTASSIUM CHLORIDE, SODIUM LACTATE AND CALCIUM CHLORIDE: 600; 310; 30; 20 INJECTION, SOLUTION INTRAVENOUS at 00:36

## 2021-02-19 RX ADMIN — ENOXAPARIN SODIUM 40 MG: 40 INJECTION SUBCUTANEOUS at 15:33

## 2021-02-19 RX ADMIN — FENTANYL CITRATE 25 MCG: 50 INJECTION, SOLUTION INTRAMUSCULAR; INTRAVENOUS at 09:56

## 2021-02-19 RX ADMIN — FENTANYL CITRATE 25 MCG: 50 INJECTION, SOLUTION INTRAMUSCULAR; INTRAVENOUS at 10:44

## 2021-02-19 RX ADMIN — INSULIN LISPRO 1 UNITS: 100 INJECTION, SOLUTION INTRAVENOUS; SUBCUTANEOUS at 20:19

## 2021-02-19 RX ADMIN — CEFEPIME 2000 MG: 2 INJECTION, POWDER, FOR SOLUTION INTRAMUSCULAR; INTRAVENOUS at 05:30

## 2021-02-19 RX ADMIN — HEPARIN SODIUM 6000 UNITS: 5000 INJECTION, SOLUTION INTRAVENOUS; SUBCUTANEOUS at 10:12

## 2021-02-19 RX ADMIN — CYCLOBENZAPRINE 10 MG: 10 TABLET, FILM COATED ORAL at 15:37

## 2021-02-19 RX ADMIN — MIDAZOLAM 1 MG: 1 INJECTION INTRAMUSCULAR; INTRAVENOUS at 10:44

## 2021-02-19 RX ADMIN — OXYCODONE 5 MG: 5 TABLET ORAL at 12:19

## 2021-02-19 RX ADMIN — SODIUM CHLORIDE, POTASSIUM CHLORIDE, SODIUM LACTATE AND CALCIUM CHLORIDE: 600; 310; 30; 20 INJECTION, SOLUTION INTRAVENOUS at 12:19

## 2021-02-19 RX ADMIN — ATORVASTATIN CALCIUM 40 MG: 40 TABLET, FILM COATED ORAL at 20:19

## 2021-02-19 RX ADMIN — VANCOMYCIN HYDROCHLORIDE 1250 MG: 10 INJECTION, POWDER, LYOPHILIZED, FOR SOLUTION INTRAVENOUS at 12:20

## 2021-02-19 RX ADMIN — VANCOMYCIN HYDROCHLORIDE 1250 MG: 10 INJECTION, POWDER, LYOPHILIZED, FOR SOLUTION INTRAVENOUS at 23:45

## 2021-02-19 RX ADMIN — ASPIRIN 81 MG: 81 TABLET, COATED ORAL at 19:20

## 2021-02-19 RX ADMIN — CLOPIDOGREL BISULFATE 75 MG: 75 TABLET ORAL at 20:18

## 2021-02-19 RX ADMIN — OXYCODONE 5 MG: 5 TABLET ORAL at 20:37

## 2021-02-19 RX ADMIN — CYCLOBENZAPRINE 10 MG: 10 TABLET, FILM COATED ORAL at 20:37

## 2021-02-19 RX ADMIN — INSULIN LISPRO 3 UNITS: 100 INJECTION, SOLUTION INTRAVENOUS; SUBCUTANEOUS at 17:32

## 2021-02-19 ASSESSMENT — PAIN DESCRIPTION - DESCRIPTORS: DESCRIPTORS: ACHING;CRAMPING

## 2021-02-19 ASSESSMENT — PAIN SCALES - GENERAL: PAINLEVEL_OUTOF10: 8

## 2021-02-19 ASSESSMENT — PAIN DESCRIPTION - ONSET: ONSET: ON-GOING

## 2021-02-19 ASSESSMENT — PAIN DESCRIPTION - LOCATION: LOCATION: FOOT

## 2021-02-19 ASSESSMENT — PAIN DESCRIPTION - FREQUENCY: FREQUENCY: CONTINUOUS

## 2021-02-19 ASSESSMENT — PAIN DESCRIPTION - ORIENTATION: ORIENTATION: RIGHT;LEFT

## 2021-02-19 NOTE — PROGRESS NOTES
Room #:  3148/7856-49    Date: 2021       Patient Name: Cat Smallwood  : 1960      MRN: [id number]     Patient unavailable for physical therapy treatment due to out of room at 1114 am. Robbi Jarvis PTA  LIC# [id number]

## 2021-02-19 NOTE — OP NOTE
[address]                                OPERATIVE REPORT    PATIENT NAME: Jil Lomas                   :        1960  MED REC NO:   [id number]                            ROOM:       9928  ACCOUNT NO:   [de-identified]                           ADMIT DATE: 2021  PROVIDER:     Glenn Rhodes MD    DATE OF PROCEDURE:  2021    PREOPERATIVE DIAGNOSIS:  Bilateral heel ulcer, left leg worse than the  right. POSTOPERATIVE DIAGNOSES:  Left distal SFA occlusion, left proximal  popliteal artery occlusion, severe arteriosclerosis of the arteries of  both lower extremities. OPERATIVE PROCEDURE:  Bilateral iliac angiogram, left femoral  angiography, nitroglycerin infusion left common femoral artery 1000 mcg  x2 boluses, right common femoral artery to left popliteal artery  catheterization, PTA of the left SFA and popliteal artery with 4 x 80  RapidCross balloon, 5 x 300 Saber balloon, 5 x 220 Powerflex balloon, 6  x 100 Powerflex balloon and 6 x 250 IN. PACT Admiral balloon, completion  left femoral angiography, ProGlide closure right common femoral artery  access site. SURGEON:  Glenn Rhodes MD    ANESTHESIA:  1% lidocaine with IV sedation. EBL:  Less than 30 mL. COMPLICATIONS:  Nil. INDICATIONS:  The patient is a 59-year-old with nonhealing ulcer of the  left heel. The patient has severe peripheral arterial disease. This  procedure was indicated. Risks and benefits were explained. The  patient and family understand and agreed to proceed. OPERATIVE NOTE:  The patient was brought to the angio suite at [facility].  The patient was placed under conscious sedation  protocol. The patient was prepped and draped in usual sterile fashion. Right groin was anesthetized with 1% lidocaine.   Right common femoral  artery is accessed in a retrograde fashion using micropuncture kit. A  4-[place]er sheath was placed and subsized over 0.035 wire to a 5-[place]  sheath. We crossed up and over using a RIM catheter, performed the  bilateral iliac angiogram and the left femoral angiography. The  findings as mentioned before. The patient is heparinized, received  nitroglycerin through the sheath in the left common femoral artery 1000  mcg x2 boluses throughout the procedure, managed to pass the wire 0.018  gold-tip Glidewire through the occluded segment of left distal SFA and  proximal popliteal artery. We changed wire platform to 0.014 wire  platform through 0.018 Quick-Cross catheter and then over that we  proceeded with PTA of the distal left SFA and popliteal artery. We used  a 4 x 80 RapidCross balloon at 8 atmospheres followed by 5 x 300 Saber  balloon at 8 atmospheres pressure followed by 5 x 220 Powerflex balloon  at 8 atmospheres pressure. Once we managed to change the wires to 0.035  stiff angled Glidewire, we changed our sheath to a 6-[place]er sheath and  up and over again over that wire to the left side and proceeded with PTA  of the left SFA and popliteal artery with a 6 x 250 IN. PACT Admiral  balloon at 5 atmospheres for 3 minutes. Completion angiogram shows  there is one area of severe stenosis in the proximal popliteal artery  which was balloon angioplastied with a 6 x 100 Powerflex balloon at 8  atmospheres pressure for a minute. Completion angiogram shows brisk  flow down the SFA into popliteal artery and three-vessel runoff which is  much better than it was before as far as speed of the flow. We pulled  the sheath back to the right groin, performed the right femoral  angiogram and closed the access site with ProGlide. The patient has a  severe proximal and right SFA stenosis as well that needs  revascularization at a later time.         Rina Gaitan MD    D: 02/19/2021 11:14:24       T: 02/19/2021 11:24:00     [email or web address]  Job#: [id number]     Doc#: [id number]    CC:

## 2021-02-19 NOTE — CARE COORDINATION
2-19-Cm note: pt can return to [facility] over the weekend if stable for discharge. Pt is aware and agrees with the plan.  Electronically signed by Dino Chaves RN on 2/19/2021 at 12:52 PM

## 2021-02-19 NOTE — PROGRESS NOTES
Assessment:  · Consulted by Dr. Raul Walton to dose/monitor vancomycin  · Goal trough level:  15-20 mcg/mL  · Pt is a 62 y/o male who presented from home diabetic foot infection and cellulitis.   · Serum creatinine today: 0.5 mg/dL; CrCl ~ 103 mL/min; baseline Scr ~ 0.8 mg/dL  · 2/16: Trough = 14.7 mcg/mL    Plan:  · Vancomycin 1250 mg IV q12hr  · Follow renal function  · Pharmacist will follow and monitor/adjust dosing as necessary      Thank you for the consult,    Juan Cordon, PharmD, BCPS 2/19/2021 2:28 PM   Ext: [phone]

## 2021-02-19 NOTE — PROGRESS NOTES
[address] [place], [place] [address]   Progress Note    Admitting Date and Time: 2/14/2021  6:32 PM  Admit Dx: Diabetic foot infection ([address] [E11.628, L08.9]    Subjective/interval history:    Pt had left femoral artery angiogram via right femoral artery with balloon angiogram of the left SFA and popliteal arteries. He has had some spasms in his leg since then, but he was having the spasms prior to the procedure. Per RN: Plan is to space out subcutaneous enoxaparin, aspirin, and clopidogrel per vascular surgery    ROS: denies fever, chills, cp, sob, n/v, HA unless stated above.      vitamin D  50,000 Units Oral Weekly    sodium chloride flush  10 mL Intravenous 2 times per day    insulin lispro  3 Units Subcutaneous TID WC    insulin glargine  8 Units Subcutaneous Nightly    sodium chloride flush  10 mL Intravenous 2 times per day    enoxaparin  40 mg Subcutaneous Daily    atorvastatin  40 mg Oral Nightly    clopidogrel  75 mg Oral Daily    aspirin  81 mg Oral Daily    hydroCHLOROthiazide  25 mg Oral Daily    ipratropium-albuterol  3 mL Inhalation Q4H    pantoprazole  40 mg Oral QAM AC    vancomycin  1,250 mg Intravenous Q12H    cefepime  2,000 mg Intravenous Q12H    insulin lispro  0-6 Units Subcutaneous TID WC    insulin lispro  0-3 Units Subcutaneous Nightly         sodium chloride flush, 10 mL, PRN      iopamidol, 110 mL, ONCE PRN      oxyCODONE, 5 mg, Q4H PRN      cyclobenzaprine, 10 mg, TID PRN      sodium chloride flush, 10 mL, PRN      promethazine, 12.5 mg, Q6H PRN    Or      ondansetron, 4 mg, Q6H PRN      polyethylene glycol, 17 g, Daily PRN      acetaminophen, 650 mg, Q6H PRN    Or      acetaminophen, 650 mg, Q6H PRN      melatonin, 9 mg, Nightly PRN      glucose, 15 g, PRN      dextrose, 12.5 g, PRN      glucagon (rDNA), 1 mg, PRN      dextrose, 100 mL/hr, PRN         Objective: CREATININE 0.5 02/19/2021    CALCIUM 9.0 02/19/2021    GFRAA >60 02/19/2021    LABGLOM >60 02/19/2021    GLUCOSE 99 02/19/2021        Radiology:   IR ANGIOGRAM EXTREMITY LEFT   Final Result      VASCULAR REPORT   Final Result      VL LOWER EXTREMITY ARTERIAL SEGMENTAL PRESSURES W PPG BILATERAL   Final Result   1. Significant pressure gradient at the left thigh level that could be   associated with significant stenosis. 2. Normal right ankle brachial index at 1.01. The left MAHOGANY is mildly   decreased at 0.93. Bilateral decreased toe brachial indices is 0.29 on the   right and 0.28 on the left. 3. Abnormal PVRs at the metatarsal level, greater on the left and digital   level. Findings could be related to significant distal small vessel disease. 4. Correlation with imaging such as doppler ultrasound or CT angiography   could be considered. XR FOOT LEFT (2 VIEWS)   Final Result   No acute osseous abnormality. No definitive evidence for osteomyelitis. The osseous structures are osteopenic. XR CHEST PORTABLE   Final Result   No pneumonia or pleural effusion. Assessment/Plan:  Principal Problem:    Staphylococcus aureus bacteremia  Active Problems:    Diabetic foot infection ([facility]    Hypertension    Hemiparesis affecting left side as late effect of cerebrovascular accident [facility])    UTI (urinary tract infection)    Peripheral arterial disease ([facility]  Resolved Problems:    * No resolved hospital problems. *      1. Staph aureus bacteremia  -Currently on vancomycin and cefepime. Infectious disease following  -He also has Enterococcus faecalis growing from surgical wounds sensitive to current antibiotic coverage.  -Bone biopsy negative for osteomyelitis    2.   Diabetic foot infection/bilateral lower extremity cellulitis  -continue vancomycin and cefepime  -Post wound debridement and bone biopsy left foot  2/16  -Surgical cultures show Enterococcus faecalis growth -Bone biopsy negative for osteomyelitis     3. Peripheral arterial disease status post left SFA and popliteal angioplasty  -Continue aspirin, statin, and clopidogrel per vascular surgery    4. Uncontrolled type 2 diabetes mellitus  -On basal insulin, prandial insulin, and corrective scale with reasonable blood glucose control. Adjust as needed    5. UTI  -Urine culture growing greater than 100,000 CFU/mL of Proteus mirabilis, sensitive to current antibiotic coverage    6. History of stroke  -Continue aspirin and statin    DVT prophylaxis: Subcutaneous enoxaparin  Full code    Disposition: Possible discharge to skilled nursing facility over the weekend. NOTE: This report was transcribed using voice recognition software. Every effort was made to ensure accuracy; however, inadvertent computerized transcription errors may be present.      Electronically signed by Nyla Cuellar DO on 2/19/2021 at 3:04 PM

## 2021-02-19 NOTE — PROGRESS NOTES
[address] [facility]  [facility]  Progress Note    NAME: Amadou Ramos  MR:  [id number]  :   1960  DATE OF SERVICE:21    This is a face to face encounter with Amadou Lot 63 y.o. male on 21  ID following for   Chief Complaint   Patient presents with    Fever     102.2 at [facility] rehab NF, 101.7 for EMS    Wound Check     BILAT HEELS X FEW WEEKS     SUBJECTIVE:  afebrile on RA  Pt in bed has vacc - changed today  Left foot  No issues overnight  Has pain left heel   S/p angio     Patient is tolerating medications. No reported adverse drug reactions. Review of systems:  As stated above in the chief complaint, otherwise negative.     Medications:  Scheduled Meds:   sodium chloride flush  10 mL Intravenous 2 times per day    insulin lispro  3 Units Subcutaneous TID WC    insulin glargine  8 Units Subcutaneous Nightly    sodium chloride flush  10 mL Intravenous 2 times per day    enoxaparin  40 mg Subcutaneous Daily    atorvastatin  40 mg Oral Nightly    clopidogrel  75 mg Oral Daily    aspirin  81 mg Oral Daily    hydroCHLOROthiazide  25 mg Oral Daily    ipratropium-albuterol  3 mL Inhalation Q4H    pantoprazole  40 mg Oral QAM AC    vancomycin  1,250 mg Intravenous Q12H    cefepime  2,000 mg Intravenous Q12H    insulin lispro  0-6 Units Subcutaneous TID WC    insulin lispro  0-3 Units Subcutaneous Nightly     Continuous Infusions:   lactated ringers 100 mL/hr at 21 1219    sodium chloride Stopped (21 0100)    dextrose       PRN Meds:sodium chloride flush, iopamidol, oxyCODONE, cyclobenzaprine, sodium chloride flush, promethazine **OR** ondansetron, polyethylene glycol, acetaminophen **OR** acetaminophen, melatonin, glucose, dextrose, glucagon (rDNA), dextrose    OBJECTIVE:  /89   Pulse 91   Temp 97.7 °F (36.5 °C) (Oral)   Resp 20   Ht 5' 11\" (1.803 m)   Wt 182 lb (82.6 kg)   SpO2 98%   BMI 25.38 kg/m²   Temp  Av.9 bone, negative for osteomyelitis. UTI  - proteus   H/o vitamin D deficiency    2/14 wound cx Mixed Gram positive organisms   Proteus species   TTE Summary    Normal left ventricular size and systolic function.    Ejection fraction is visually estimated at 60-65%.    Indeterminate diastolic function.    No regional wall motion abnormalities seen.    Mild left ventricular concentric hypertrophy noted.    Normal right ventricular size and function.    The left atrium is mildly dilated.    Aortic valve leaflets are moderately calcified.    Aortic leaflets are thickened, likely due to degenerative changes, however    a small vegetation can not be excluded.    Mild aortic valve regurgitation.    No vegetations seen otherwise. LYRIC IS RECOMMENDED    Plan:     S/p angio  For LYRIC  Cont atbx  picc rue      vancomycin (VANCOCIN) 1,250 mg in dextrose 5 % 250 mL IVPB, Q12H    cefepime (MAXIPIME) 2000 mg IVPB extended (mini-bag), Q12H    allergic to pcn          Imaging and labs were reviewed per medical records. Pt had the opportunity to ask questions. All questions were answered. Thank you for involving me in the care of Edvin Richards. Please do not hesitate to call for any questions or concerns.     Electronically signed by Anupama Gross MD on 2/19/2021 at 1:52 PM    Phone [phone]  Fax [phone]

## 2021-02-19 NOTE — PROGRESS NOTES
Foot and Ankle Surgery  Progress Note    History:  Patient seen bedside s/p left heel excisional debridement through level of deep fascia / muscle, I&D of left foot abscess, bone biopsy, and wound vac application (DOS 2/71). Seen with nursing team present s/p angiogram. Patient states he has a lot of pain in his left heel and in his groin. No acute events overnight. Patient denies any N/V/D/F/C/SOB/CP. No other complaints at this time. Past Medical History:   Diagnosis Date    Cerebral artery occlusion with [facility] infarction ([facility] [address] [facility]     Diabetes mellitus ([facility] [address] [facility]     Type 2    Hemiparesis affecting left side as late effect of cerebrovascular accident ([facility] [address]     LEFT SIDE NON DOMINANT FOLLOWING STROKE    Hypertension      Past Surgical History:   Procedure Laterality Date    FINGER AMPUTATION      FOOT DEBRIDEMENT Left 2/16/2021    LEFT FOOT DEBRIDEMENT WITH BONE BIOPSY, WOUND VAC APPLICATION performed by Jacky Coulter DPM at [place] [address] ARTHROSCOPY      TONSILLECTOMY      UPPER GASTROINTESTINAL ENDOSCOPY N/A 1/4/2021    EGD BIOPSY performed by Wilma Montoya DO at [facility] 23 reviewed. No pertinent family history. Allergies   Allergen Reactions    Pcn [Penicillins]        Objective  Vitals:    02/18/21 0219 02/18/21 0645 02/18/21 1816 02/19/21 0615   BP: (!) 136/53 112/69 110/71 118/76   Pulse: 57 82 86 82   Resp: 18 18 18 20   Temp: 97.9 °F (36.6 °C) 97.3 °F (36.3 °C) 98.1 °F (36.7 °C) 98.4 °F (36.9 °C)   TempSrc: Oral Oral Oral Oral   SpO2:  94% 94% 90%   Weight:       Height:         Physical Exam:  NEUROLOGICAL EXAMINATION: Protective sensation absent to bilateral lower extremities. Epicritic sensation is intact. VASCULAR EXAMINATION:  DP and PT pulses are weakly palpable +1. Capillary refill is less than five seconds at the toes. Skin temperature is warm to cool proximal to distal.  There is absence of hair growth noted bilaterally. DERMATOLOGICAL:  There is full-thickness ulceration in the posterior left heel measuring approximately 4.5x6x0.5cm. Does probe to bone post-operatively. There is mild sanguinous drainage in canister. Mild surrounding erythema. No malodor, purulence, or fluctuance. Maceration plantarly. On the right heel, a granular wound, appears to be stable. Measures approximately 1.5cm in diameter and down to subQ tissue. No signs of infection. Mild erythema. MUSCULOSKELETAL:  Muscle strength is 3/5 to the foot and leg muscle groups on the left side, 4/5 on the right. No pain on compression of calves. No clinical signs of DVT. Severe pain on palpation of heels. Left LE involuntary contractions. Micro / Path:  Recent Labs     02/16/21  1637   BC 24 Hours no growth       Assessment:  1. S/p left heel excisional debridement through level of deep fascia / muscle, I&D of left foot abscess, bone biopsy, and wound vac application (DOS 1/40)  2. Left heel wound - POA, infected - resolving  3. Acute OM - biopsy negative for OM  4. Left foot cellulitis  5. Bacteremia - TTE neg for vegitations  6. PAD - Agram this AM  7. IDDM with neuropathy  8. Right heel wound - Stable, POA, not infected  9. UTI - Proteus    Plan:  - Local wound care via wound vac, peripheral vascular flow optimization and abx therapy. May require grafting procedure at some point in the future; date and time pending when patient is to be discharged. May be performed this admission if patient will still be in hospital. Otherwise, can be set up on out patient basis. - Vascular sx following. Will follow with you - agram this AM. PTA of L SFA / pop noted. Flow improved post-procedure with 3 vessel run off. - Abx per at ID team discretion. Vanc, cefepime now. 6-8 weeks, PICC. Echo. Path neg for OM.  - Offloading of heels, very important. Must use offloading boots and/or float heels with padding under the legs while in bed. - Dressings: Left heel - wound vac, as per orders, change today. Right heel - betadine with mepilex QOD. Changed today by myself and nursing team.  - PT/OT when able. - NWB to L LE. Can WB to R LE in surgical shoe for transfers only. - Patient stable for DC from foot and ankle POV. - Follow up with Dr. Gabrielle Rothman in clinic within 1 week of discharge for further care.   - Will follow while in hospital    DW: Holger Powell DPM  Fellowship-Trained Foot and Ankle Surgeon  [phone]

## 2021-02-19 NOTE — PLAN OF CARE
Problem: Pain:  Goal: Pain level will decrease  Description: Pain level will decrease  Outcome: Met This Shift     Problem: Pain:  Goal: Control of acute pain  Description: Control of acute pain  Outcome: Met This Shift     Problem: Pain:  Goal: Control of chronic pain  Description: Control of chronic pain  Outcome: Met This Shift     Problem: Falls - Risk of:  Goal: Will remain free from falls  Description: Will remain free from falls  Outcome: Met This Shift     Problem: Falls - Risk of:  Goal: Absence of physical injury  Description: Absence of physical injury  Outcome: Met This Shift     Problem: Skin Integrity:  Goal: Will show no infection signs and symptoms  Description: Will show no infection signs and symptoms  Outcome: Met This Shift     Problem: Skin Integrity:  Goal: Absence of new skin breakdown  Description: Absence of new skin breakdown  Outcome: Met This Shift     Problem: Serum Glucose Level - Abnormal:  Goal: Ability to maintain appropriate glucose levels will improve  Description: Ability to maintain appropriate glucose levels will improve  Outcome: Met This Shift

## 2021-02-20 LAB
ANION GAP SERPL CALCULATED.3IONS-SCNC: 9 MMOL/L (ref 7–16)
BLOOD CULTURE, ROUTINE: ABNORMAL
BLOOD CULTURE, ROUTINE: ABNORMAL
BUN BLDV-MCNC: 12 MG/DL (ref 8–23)
CALCIUM SERPL-MCNC: 8.7 MG/DL (ref 8.6–10.2)
CHLORIDE BLD-SCNC: 100 MMOL/L (ref 98–107)
CO2: 25 MMOL/L (ref 22–29)
CREAT SERPL-MCNC: 0.5 MG/DL (ref 0.7–1.2)
CULTURE SURGICAL: ABNORMAL
CULTURE, BLOOD 2: ABNORMAL
GFR AFRICAN AMERICAN: >60
GFR NON-AFRICAN AMERICAN: >60 ML/MIN/1.73
GLUCOSE BLD-MCNC: 109 MG/DL (ref 74–99)
HCT VFR BLD CALC: 24.7 % (ref 37–54)
HEMOGLOBIN: 7.8 G/DL (ref 12.5–16.5)
MCH RBC QN AUTO: 25.9 PG (ref 26–35)
MCHC RBC AUTO-ENTMCNC: 31.6 % (ref 32–34.5)
MCV RBC AUTO: 82.1 FL (ref 80–99.9)
METER GLUCOSE: 114 MG/DL (ref 74–99)
METER GLUCOSE: 153 MG/DL (ref 74–99)
METER GLUCOSE: 161 MG/DL (ref 74–99)
METER GLUCOSE: 230 MG/DL (ref 74–99)
ORGANISM: ABNORMAL
PDW BLD-RTO: 15.3 FL (ref 11.5–15)
PLATELET # BLD: 270 E9/L (ref 130–450)
PMV BLD AUTO: 9.5 FL (ref 7–12)
POTASSIUM SERPL-SCNC: 3.9 MMOL/L (ref 3.5–5)
RBC # BLD: 3.01 E12/L (ref 3.8–5.8)
SODIUM BLD-SCNC: 134 MMOL/L (ref 132–146)
WBC # BLD: 9.2 E9/L (ref 4.5–11.5)

## 2021-02-20 PROCEDURE — 1200000000 HC SEMI PRIVATE

## 2021-02-20 PROCEDURE — 36415 COLL VENOUS BLD VENIPUNCTURE: CPT

## 2021-02-20 PROCEDURE — 2580000003 HC RX 258: Performed by: INTERNAL MEDICINE

## 2021-02-20 PROCEDURE — 6370000000 HC RX 637 (ALT 250 FOR IP): Performed by: PODIATRIST

## 2021-02-20 PROCEDURE — 6370000000 HC RX 637 (ALT 250 FOR IP): Performed by: INTERNAL MEDICINE

## 2021-02-20 PROCEDURE — 99232 SBSQ HOSP IP/OBS MODERATE 35: CPT | Performed by: INTERNAL MEDICINE

## 2021-02-20 PROCEDURE — 2580000003 HC RX 258: Performed by: SPECIALIST

## 2021-02-20 PROCEDURE — 82962 GLUCOSE BLOOD TEST: CPT

## 2021-02-20 PROCEDURE — 2580000003 HC RX 258: Performed by: PODIATRIST

## 2021-02-20 PROCEDURE — 6360000002 HC RX W HCPCS: Performed by: PODIATRIST

## 2021-02-20 PROCEDURE — 85027 COMPLETE CBC AUTOMATED: CPT

## 2021-02-20 PROCEDURE — 80202 ASSAY OF VANCOMYCIN: CPT

## 2021-02-20 PROCEDURE — 80048 BASIC METABOLIC PNL TOTAL CA: CPT

## 2021-02-20 RX ADMIN — VANCOMYCIN HYDROCHLORIDE 1250 MG: 10 INJECTION, POWDER, LYOPHILIZED, FOR SOLUTION INTRAVENOUS at 13:42

## 2021-02-20 RX ADMIN — INSULIN LISPRO 3 UNITS: 100 INJECTION, SOLUTION INTRAVENOUS; SUBCUTANEOUS at 17:22

## 2021-02-20 RX ADMIN — SODIUM CHLORIDE: 9 INJECTION, SOLUTION INTRAVENOUS at 21:01

## 2021-02-20 RX ADMIN — PANTOPRAZOLE SODIUM 40 MG: 40 TABLET, DELAYED RELEASE ORAL at 05:39

## 2021-02-20 RX ADMIN — INSULIN LISPRO 3 UNITS: 100 INJECTION, SOLUTION INTRAVENOUS; SUBCUTANEOUS at 13:40

## 2021-02-20 RX ADMIN — ASPIRIN 81 MG: 81 TABLET, COATED ORAL at 11:39

## 2021-02-20 RX ADMIN — INSULIN LISPRO 1 UNITS: 100 INJECTION, SOLUTION INTRAVENOUS; SUBCUTANEOUS at 21:05

## 2021-02-20 RX ADMIN — INSULIN LISPRO 1 UNITS: 100 INJECTION, SOLUTION INTRAVENOUS; SUBCUTANEOUS at 13:40

## 2021-02-20 RX ADMIN — CLOPIDOGREL BISULFATE 75 MG: 75 TABLET ORAL at 09:31

## 2021-02-20 RX ADMIN — Medication 9 MG: at 21:00

## 2021-02-20 RX ADMIN — Medication 10 ML: at 13:43

## 2021-02-20 RX ADMIN — CEFEPIME 2000 MG: 2 INJECTION, POWDER, FOR SOLUTION INTRAMUSCULAR; INTRAVENOUS at 17:26

## 2021-02-20 RX ADMIN — CYCLOBENZAPRINE 10 MG: 10 TABLET, FILM COATED ORAL at 11:40

## 2021-02-20 RX ADMIN — OXYCODONE 5 MG: 5 TABLET ORAL at 14:34

## 2021-02-20 RX ADMIN — INSULIN LISPRO 3 UNITS: 100 INJECTION, SOLUTION INTRAVENOUS; SUBCUTANEOUS at 09:32

## 2021-02-20 RX ADMIN — INSULIN LISPRO 2 UNITS: 100 INJECTION, SOLUTION INTRAVENOUS; SUBCUTANEOUS at 17:21

## 2021-02-20 RX ADMIN — INSULIN GLARGINE 8 UNITS: 100 INJECTION, SOLUTION SUBCUTANEOUS at 21:07

## 2021-02-20 RX ADMIN — ATORVASTATIN CALCIUM 40 MG: 40 TABLET, FILM COATED ORAL at 21:00

## 2021-02-20 RX ADMIN — SODIUM CHLORIDE, POTASSIUM CHLORIDE, SODIUM LACTATE AND CALCIUM CHLORIDE: 600; 310; 30; 20 INJECTION, SOLUTION INTRAVENOUS at 11:20

## 2021-02-20 RX ADMIN — OXYCODONE 5 MG: 5 TABLET ORAL at 09:31

## 2021-02-20 RX ADMIN — ENOXAPARIN SODIUM 40 MG: 40 INJECTION SUBCUTANEOUS at 11:40

## 2021-02-20 RX ADMIN — CYCLOBENZAPRINE 10 MG: 10 TABLET, FILM COATED ORAL at 21:00

## 2021-02-20 RX ADMIN — Medication 10 ML: at 21:02

## 2021-02-20 RX ADMIN — HYDROCHLOROTHIAZIDE 25 MG: 25 TABLET ORAL at 09:31

## 2021-02-20 RX ADMIN — CEFEPIME 2000 MG: 2 INJECTION, POWDER, FOR SOLUTION INTRAMUSCULAR; INTRAVENOUS at 05:40

## 2021-02-20 RX ADMIN — SODIUM CHLORIDE, PRESERVATIVE FREE 10 ML: 5 INJECTION INTRAVENOUS at 13:42

## 2021-02-20 ASSESSMENT — PAIN DESCRIPTION - ORIENTATION: ORIENTATION: LEFT

## 2021-02-20 ASSESSMENT — PAIN SCALES - GENERAL
PAINLEVEL_OUTOF10: 8
PAINLEVEL_OUTOF10: 5

## 2021-02-20 ASSESSMENT — PAIN DESCRIPTION - ONSET: ONSET: ON-GOING

## 2021-02-20 NOTE — PROGRESS NOTES
No complaints  No groin hematoma  Lt foot pink warm, b pedal pulses+  Successful revascularization lt leg  Needs rt leg revascularization after rt foot heals  Will sign off

## 2021-02-20 NOTE — PROGRESS NOTES
(Tissue) 2/16 Anaerobic GNR   Anaerobic cx  (Bone) 2/16 Anaerobes not isolated               Assessment:  · Consulted by Dr. Chase Ayon to dose/monitor vancomycin  · Goal trough level:  15-20 mcg/mL  · Pt is a 64 y/o male who presented from home diabetic foot infection and cellulitis.   · Serum creatinine today: 0.5 mg/dL; CrCl ~ 103 mL/min; baseline Scr ~ 0.8 mg/dL  · 2/16: Trough = 14.7 mcg/mL    Plan:  · Vancomycin 1250 mg IV q12hr  · Follow renal function  · Pharmacist will follow and monitor/adjust dosing as necessary      Thank you for the consult,  Eldon Baxter, PharmD, BCPS 2/20/2021 11:12 AM

## 2021-02-20 NOTE — PROGRESS NOTES
[facility]  [facility]  Progress Note    NAME: Edvin Richards  MR:  [id number]  :   1960  DATE OF SERVICE:21    This is a face to face encounter with Edvin Richards 63 y.o. male on 21  ID following for   Chief Complaint   Patient presents with    Fever     102.2 at [facility] rehab NF, 101.7 for EMS    Wound Check     BILAT HEELS X FEW WEEKS     SUBJECTIVE:  afebrile on RA  Pt in bed has no c/o   Has no questions  No issues overnight     S/p angio     Patient is tolerating medications. No reported adverse drug reactions. Review of systems:  As stated above in the chief complaint, otherwise negative.     Medications:  Scheduled Meds:   vitamin D  50,000 Units Oral Weekly    sodium chloride flush  10 mL Intravenous 2 times per day    insulin lispro  3 Units Subcutaneous TID WC    insulin glargine  8 Units Subcutaneous Nightly    sodium chloride flush  10 mL Intravenous 2 times per day    enoxaparin  40 mg Subcutaneous Daily    atorvastatin  40 mg Oral Nightly    clopidogrel  75 mg Oral Daily    aspirin  81 mg Oral Daily    hydroCHLOROthiazide  25 mg Oral Daily    ipratropium-albuterol  3 mL Inhalation Q4H    pantoprazole  40 mg Oral QAM AC    vancomycin  1,250 mg Intravenous Q12H    cefepime  2,000 mg Intravenous Q12H    insulin lispro  0-6 Units Subcutaneous TID WC    insulin lispro  0-3 Units Subcutaneous Nightly     Continuous Infusions:   lactated ringers 100 mL/hr at 21 0951    sodium chloride Stopped (21 2249)    dextrose       PRN Meds:sodium chloride flush, iopamidol, oxyCODONE, cyclobenzaprine, sodium chloride flush, promethazine **OR** ondansetron, polyethylene glycol, acetaminophen **OR** acetaminophen, melatonin, glucose, dextrose, glucagon (rDNA), dextrose    OBJECTIVE:  /70   Pulse 90   Temp 97.6 °F (36.4 °C) (Oral)   Resp 18   Ht 5' 11\" (1.803 m)   Wt 182 lb (82.6 kg)   SpO2 96%   BMI 25.38 kg/m² Temp  Av.8 °F (36.6 °C)  Min: 97.6 °F (36.4 °C)  Max: 98.3 °F (36.8 °C)  Constitutional:  The patient is awake   Skin:    Warm and dry. HEENT:      AT/NC drys skin face  Chest:   Symmetrical expansion. cta ant   Cardiovascular:  S1 and S2 are rhythmic and regular. Abdomen:   Positive bowel sounds to auscultation. Benign to palpation. Extremities:   no edema. Right vacc right groin dressed dry no swelling   CNS    AAxO   Lines: picc   Radiology:  Laboratory and Tests Review:  Lab Results   Component Value Date    WBC 9.2 2021    WBC 10.4 2021    WBC 8.4 2021    HGB 7.8 (L) 2021    HCT 24.7 (L) 2021    MCV 82.1 2021     2021     No results found for: [facility]  Lab Results   Component Value Date    ALT 19 2021    AST 26 2021    ALKPHOS 87 2021    BILITOT 0.5 2021     Lab Results   Component Value Date     2021    K 3.9 2021    K 3.8 2021     2021    CO2 25 2021    BUN 12 2021    CREATININE 0.5 2021    CREATININE 0.5 2021    CREATININE 0.7 2021    GFRAA >60 2021    LABGLOM >60 2021    GLUCOSE 109 2021    PROT 6.2 2021    LABALBU 2.7 2021    CALCIUM 8.7 2021    BILITOT 0.5 2021    ALKPHOS 87 2021    AST 26 2021    ALT 19 2021     Lab Results   Component Value Date    CRP 2.3 (H) 2021     Lab Results   Component Value Date    SEDRATE 87 (H) 2021     No results for input(s): CRP, PROCAL, FERRITIN, LDH, TROPONINI, DDIMER, FIBRINOGEN, INR, PROTIME, AST, ALT, TRIG in the last 72 hours. Lab Results   Component Value Date    CHOL 164 2016    TRIG 170 2016    HDL 43 2016    LDLCALC 87 2016    LABVLDL 34 2016     Lab Results   Component Value Date/Time    VITD25 12 (L) 2020 09:15 AM       Microbiology:   No results for input(s): COVID19 in the last 72 hours.   Lab Results Component Value Date    BC 24 Hours no growth 02/16/2021    BC  02/14/2021     24 Hours no growth  Gram stain performed from blood culture bottle media  Gram positive cocci in clusters  Previously positive blood culture called      [facility]  02/14/2021     Refer to previous culture for susceptibility results  of CXBL2 (LAC) collected 02/14/2021 at 19:07      BLOODCULT2 24 Hours no growth 02/16/2021    BLOODCULT2  02/14/2021     Gram stain performed from blood culture bottle media  Gram positive cocci in clusters      BLOODCULT2  02/14/2021     Sensitivity to follow  This isolate is presumed to be resistant based on the  detection of inducible Clindamycin resistance. Clindamycin  may still be effective in some patients. ORG Enterococcus faecalis 02/16/2021    ORG Anaerobic gram negative justo 02/16/2021    ORG Proteus mirabilis 02/16/2021    ORG Enterococcus faecalis 02/16/2021    ORG Staphylococcus aureus 02/16/2021    ORG Streptococcus anginosus 02/16/2021     WOUND/ABSCESS   Date Value Ref Range Status   02/14/2021   Final    Mixed laura isolated. Further workup and sensitivity testing  is not routinely indicated and will not be performed.   Mixed laura isolated includes:  Proteus species  Oxidase positive Gram negative rods  Nonhemolytic Strep species       Smear, Respiratory   Date Value Ref Range Status   01/01/2021   Final    Group 6: <25 PMN's/LPF and <25 Epithelial cells/LPF  Rare Polymorphonuclear leukocytes  Epithelial cells not seen  No organisms seen       No results found for: MPNEUMO, CLAMYDCU, LABLEGI, AFBCX, FUNGSM, LABFUNG    MRSA Culture Only   Date Value Ref Range Status   12/26/2020 Methicillin resistant Staph aureus not isolated  Final     CULTURE, RESPIRATORY   Date Value Ref Range Status   01/01/2021 Oral Pharyngeal Laura absent  Growth not present    Final     ASSESSMENT:  MSSA bacteremia   BILATERAL HEEL PRESSURE ULCERS   NECROTIC WOUND, OSTEOMYELITIS, Cellulitis left foot  S/p LEFT FOOT

## 2021-02-20 NOTE — PROGRESS NOTES
Foot and Ankle Surgery  Progress Note    History:  Patient seen bedside s/p left heel excisional debridement through level of deep fascia / muscle, I&D of left foot abscess, bone biopsy, and wound vac application (DOS 1/82). Patient states he pain has decreased since yesterday. No acute events overnight. Patient denies any N/V/D/F/C/SOB/CP. No other complaints at this time. Past Medical History:   Diagnosis Date    Cerebral artery occlusion with cerebral infarction ([facility] [address]     Diabetes mellitus ([facility] [address]     Type 2    Hemiparesis affecting left side as late effect of cerebrovascular accident ([facility] [address]     LEFT SIDE NON DOMINANT FOLLOWING STROKE    Hypertension      Past Surgical History:   Procedure Laterality Date    FINGER AMPUTATION      FOOT DEBRIDEMENT Left 2/16/2021    LEFT FOOT DEBRIDEMENT WITH BONE BIOPSY, WOUND VAC APPLICATION performed by Srinivas Haskins DPM at [place] [address] ARTHROSCOPY      TONSILLECTOMY      UPPER GASTROINTESTINAL ENDOSCOPY N/A 1/4/2021    EGD BIOPSY performed by Celso Rao DO at [facility] 23 reviewed. No pertinent family history. Allergies   Allergen Reactions    Pcn [Penicillins]        Objective  Vitals:    02/19/21 1733 02/20/21 0000 02/20/21 0503 02/20/21 0945   BP: 133/74  (!) 144/77 118/70   Pulse: 102 94 75 90   Resp: 16  18    Temp: 98.3 °F (36.8 °C)  97.6 °F (36.4 °C)    TempSrc: Oral  Oral    SpO2: 97% 98% 98% 96%   Weight:       Height:         Physical Exam:  NEUROLOGICAL EXAMINATION: Protective sensation absent to bilateral lower extremities. Epicritic sensation is intact. VASCULAR EXAMINATION:  DP and PT pulses are weakly palpable +1. Capillary refill is less than five seconds at the toes. Skin temperature is warm to cool proximal to distal.  There is absence of hair growth noted bilaterally. DERMATOLOGICAL:  There is full-thickness ulceration in the posterior left heel measuring approximately 4.5x6x0.5cm.  Does probe to bone only.  - Patient stable for DC from foot and ankle POV. - Follow up with Dr. Noah Lewis in clinic within 1 week of discharge for further care.   - Will follow while in hospital    DW: Dhruv Sr DPM  Fellowship-Trained Foot and Ankle Surgeon  [phone]

## 2021-02-21 LAB
ANION GAP SERPL CALCULATED.3IONS-SCNC: 9 MMOL/L (ref 7–16)
BLOOD CULTURE, ROUTINE: NORMAL
BUN BLDV-MCNC: 10 MG/DL (ref 8–23)
CALCIUM SERPL-MCNC: 9 MG/DL (ref 8.6–10.2)
CHLORIDE BLD-SCNC: 101 MMOL/L (ref 98–107)
CO2: 25 MMOL/L (ref 22–29)
CREAT SERPL-MCNC: 0.5 MG/DL (ref 0.7–1.2)
CULTURE, BLOOD 2: NORMAL
GFR AFRICAN AMERICAN: >60
GFR NON-AFRICAN AMERICAN: >60 ML/MIN/1.73
GLUCOSE BLD-MCNC: 126 MG/DL (ref 74–99)
HCT VFR BLD CALC: 25.6 % (ref 37–54)
HEMOGLOBIN: 8 G/DL (ref 12.5–16.5)
MCH RBC QN AUTO: 25.8 PG (ref 26–35)
MCHC RBC AUTO-ENTMCNC: 31.3 % (ref 32–34.5)
MCV RBC AUTO: 82.6 FL (ref 80–99.9)
METER GLUCOSE: 128 MG/DL (ref 74–99)
METER GLUCOSE: 190 MG/DL (ref 74–99)
METER GLUCOSE: 194 MG/DL (ref 74–99)
METER GLUCOSE: 197 MG/DL (ref 74–99)
PDW BLD-RTO: 15.3 FL (ref 11.5–15)
PLATELET # BLD: 292 E9/L (ref 130–450)
PMV BLD AUTO: 9.6 FL (ref 7–12)
POTASSIUM SERPL-SCNC: 4 MMOL/L (ref 3.5–5)
RBC # BLD: 3.1 E12/L (ref 3.8–5.8)
SARS-COV-2, PCR: NOT DETECTED
SODIUM BLD-SCNC: 135 MMOL/L (ref 132–146)
VANCOMYCIN TROUGH: 14.8 MCG/ML (ref 5–16)
WBC # BLD: 9.8 E9/L (ref 4.5–11.5)

## 2021-02-21 PROCEDURE — 2580000003 HC RX 258: Performed by: PODIATRIST

## 2021-02-21 PROCEDURE — 36415 COLL VENOUS BLD VENIPUNCTURE: CPT

## 2021-02-21 PROCEDURE — 82962 GLUCOSE BLOOD TEST: CPT

## 2021-02-21 PROCEDURE — 6360000002 HC RX W HCPCS: Performed by: PODIATRIST

## 2021-02-21 PROCEDURE — 6370000000 HC RX 637 (ALT 250 FOR IP): Performed by: INTERNAL MEDICINE

## 2021-02-21 PROCEDURE — 2580000003 HC RX 258: Performed by: SPECIALIST

## 2021-02-21 PROCEDURE — 99232 SBSQ HOSP IP/OBS MODERATE 35: CPT | Performed by: INTERNAL MEDICINE

## 2021-02-21 PROCEDURE — 85027 COMPLETE CBC AUTOMATED: CPT

## 2021-02-21 PROCEDURE — 6370000000 HC RX 637 (ALT 250 FOR IP): Performed by: PODIATRIST

## 2021-02-21 PROCEDURE — 80048 BASIC METABOLIC PNL TOTAL CA: CPT

## 2021-02-21 PROCEDURE — 1200000000 HC SEMI PRIVATE

## 2021-02-21 RX ORDER — OXYCODONE HYDROCHLORIDE 5 MG/1
5 TABLET ORAL EVERY 4 HOURS PRN
Qty: 30 TABLET | Refills: 0 | Status: SHIPPED | OUTPATIENT
Start: 2021-02-21 | End: 2021-02-26

## 2021-02-21 RX ORDER — CYCLOBENZAPRINE HCL 10 MG
10 TABLET ORAL 3 TIMES DAILY PRN
Refills: 0 | DISCHARGE
Start: 2021-02-21 | End: 2021-03-03

## 2021-02-21 RX ORDER — NICOTINE POLACRILEX 4 MG
15 LOZENGE BUCCAL PRN
Qty: 45 G | Refills: 1 | DISCHARGE
Start: 2021-02-21 | End: 2022-01-28

## 2021-02-21 RX ADMIN — OXYCODONE 5 MG: 5 TABLET ORAL at 21:52

## 2021-02-21 RX ADMIN — ATORVASTATIN CALCIUM 40 MG: 40 TABLET, FILM COATED ORAL at 20:16

## 2021-02-21 RX ADMIN — INSULIN LISPRO 1 UNITS: 100 INJECTION, SOLUTION INTRAVENOUS; SUBCUTANEOUS at 20:18

## 2021-02-21 RX ADMIN — INSULIN LISPRO 3 UNITS: 100 INJECTION, SOLUTION INTRAVENOUS; SUBCUTANEOUS at 17:27

## 2021-02-21 RX ADMIN — ENOXAPARIN SODIUM 40 MG: 40 INJECTION SUBCUTANEOUS at 09:14

## 2021-02-21 RX ADMIN — OXYCODONE 5 MG: 5 TABLET ORAL at 15:21

## 2021-02-21 RX ADMIN — INSULIN GLARGINE 8 UNITS: 100 INJECTION, SOLUTION SUBCUTANEOUS at 20:19

## 2021-02-21 RX ADMIN — VANCOMYCIN HYDROCHLORIDE 1250 MG: 10 INJECTION, POWDER, LYOPHILIZED, FOR SOLUTION INTRAVENOUS at 13:30

## 2021-02-21 RX ADMIN — CEFEPIME 2000 MG: 2 INJECTION, POWDER, FOR SOLUTION INTRAMUSCULAR; INTRAVENOUS at 17:30

## 2021-02-21 RX ADMIN — SODIUM CHLORIDE, PRESERVATIVE FREE 10 ML: 5 INJECTION INTRAVENOUS at 20:16

## 2021-02-21 RX ADMIN — ASPIRIN 81 MG: 81 TABLET, COATED ORAL at 09:13

## 2021-02-21 RX ADMIN — CLOPIDOGREL BISULFATE 75 MG: 75 TABLET ORAL at 09:14

## 2021-02-21 RX ADMIN — INSULIN LISPRO 3 UNITS: 100 INJECTION, SOLUTION INTRAVENOUS; SUBCUTANEOUS at 12:08

## 2021-02-21 RX ADMIN — VANCOMYCIN HYDROCHLORIDE 1250 MG: 10 INJECTION, POWDER, LYOPHILIZED, FOR SOLUTION INTRAVENOUS at 23:59

## 2021-02-21 RX ADMIN — SODIUM CHLORIDE: 9 INJECTION, SOLUTION INTRAVENOUS at 21:15

## 2021-02-21 RX ADMIN — CYCLOBENZAPRINE 10 MG: 10 TABLET, FILM COATED ORAL at 20:16

## 2021-02-21 RX ADMIN — PANTOPRAZOLE SODIUM 40 MG: 40 TABLET, DELAYED RELEASE ORAL at 05:30

## 2021-02-21 RX ADMIN — CYCLOBENZAPRINE 10 MG: 10 TABLET, FILM COATED ORAL at 09:15

## 2021-02-21 RX ADMIN — INSULIN LISPRO 1 UNITS: 100 INJECTION, SOLUTION INTRAVENOUS; SUBCUTANEOUS at 12:08

## 2021-02-21 RX ADMIN — HYDROCHLOROTHIAZIDE 25 MG: 25 TABLET ORAL at 09:15

## 2021-02-21 RX ADMIN — CEFEPIME 2000 MG: 2 INJECTION, POWDER, FOR SOLUTION INTRAMUSCULAR; INTRAVENOUS at 05:30

## 2021-02-21 RX ADMIN — INSULIN LISPRO 1 UNITS: 100 INJECTION, SOLUTION INTRAVENOUS; SUBCUTANEOUS at 17:27

## 2021-02-21 RX ADMIN — INSULIN LISPRO 3 UNITS: 100 INJECTION, SOLUTION INTRAVENOUS; SUBCUTANEOUS at 08:24

## 2021-02-21 RX ADMIN — OXYCODONE 5 MG: 5 TABLET ORAL at 09:15

## 2021-02-21 RX ADMIN — VANCOMYCIN HYDROCHLORIDE 1250 MG: 10 INJECTION, POWDER, LYOPHILIZED, FOR SOLUTION INTRAVENOUS at 00:18

## 2021-02-21 RX ADMIN — Medication 9 MG: at 20:16

## 2021-02-21 ASSESSMENT — PAIN DESCRIPTION - PAIN TYPE
TYPE: ACUTE PAIN
TYPE: ACUTE PAIN

## 2021-02-21 ASSESSMENT — PAIN DESCRIPTION - FREQUENCY: FREQUENCY: CONTINUOUS

## 2021-02-21 ASSESSMENT — PAIN DESCRIPTION - LOCATION: LOCATION: FOOT

## 2021-02-21 ASSESSMENT — PAIN SCALES - GENERAL
PAINLEVEL_OUTOF10: 9
PAINLEVEL_OUTOF10: 8
PAINLEVEL_OUTOF10: 10

## 2021-02-21 NOTE — PROGRESS NOTES
[facility]  [facility]  Progress Note    NAME: Elba Degroot  MR:  [id number]  :   1960  DATE OF SERVICE:21    This is a face to face encounter with Elba Degroot 63 y.o. male on 21  ID following for   Chief Complaint   Patient presents with    Fever     102.2 at [facility] rehab NF, 101.7 for EMS    Wound Check     BILAT HEELS X FEW WEEKS     SUBJECTIVE:  afebrile on RA  Pt in bed has no c/o   Has no questions  No issues overnight     S/p angio  Feels ok   vacc on      Patient is tolerating medications. No reported adverse drug reactions. Review of systems:  As stated above in the chief complaint, otherwise negative.     Medications:  Scheduled Meds:   vitamin D  50,000 Units Oral Weekly    sodium chloride flush  10 mL Intravenous 2 times per day    insulin lispro  3 Units Subcutaneous TID WC    insulin glargine  8 Units Subcutaneous Nightly    sodium chloride flush  10 mL Intravenous 2 times per day    enoxaparin  40 mg Subcutaneous Daily    atorvastatin  40 mg Oral Nightly    clopidogrel  75 mg Oral Daily    aspirin  81 mg Oral Daily    hydroCHLOROthiazide  25 mg Oral Daily    ipratropium-albuterol  3 mL Inhalation Q4H    pantoprazole  40 mg Oral QAM AC    vancomycin  1,250 mg Intravenous Q12H    cefepime  2,000 mg Intravenous Q12H    insulin lispro  0-6 Units Subcutaneous TID WC    insulin lispro  0-3 Units Subcutaneous Nightly     Continuous Infusions:   lactated ringers 100 mL/hr at 21 1120    sodium chloride Stopped (21 2302)    dextrose       PRN Meds:sodium chloride flush, iopamidol, oxyCODONE, cyclobenzaprine, sodium chloride flush, promethazine **OR** ondansetron, polyethylene glycol, acetaminophen **OR** acetaminophen, melatonin, glucose, dextrose, glucagon (rDNA), dextrose    OBJECTIVE: BP (!) 98/58   Pulse 97   Temp 97.8 °F (36.6 °C) (Oral)   Resp 16   Ht 5' 11\" (1.803 m)   Wt 182 lb (82.6 kg)   SpO2 99%   BMI 25.38 kg/m²   Temp  Av.9 °F (36.6 °C)  Min: 97.8 °F (36.6 °C)  Max: 98 °F (36.7 °C)  Constitutional:  The patient is awake  nad  Skin:    Warm and dry. HEENT:      AT/NC drys skin face  Chest:   Symmetrical expansion. cta ant no wheeze  Cardiovascular:  S1 and S2 are rhythmic and regular. Abdomen:   Positive bowel sounds to auscultation. Benign to palpation. Extremities:    Right vacc right groin dressed dry no swelling   CNS    AAxO   Lines: picc rue   Radiology:  Laboratory and Tests Review:  Lab Results   Component Value Date    WBC 9.8 2021    WBC 9.2 2021    WBC 10.4 2021    HGB 8.0 (L) 2021    HCT 25.6 (L) 2021    MCV 82.6 2021     2021     No results found for: [facility]  Lab Results   Component Value Date    ALT 19 2021    AST 26 2021    ALKPHOS 87 2021    BILITOT 0.5 2021     Lab Results   Component Value Date     2021    K 4.0 2021    K 3.8 2021     2021    CO2 25 2021    BUN 10 2021    CREATININE 0.5 2021    CREATININE 0.5 2021    CREATININE 0.5 2021    GFRAA >60 2021    LABGLOM >60 2021    GLUCOSE 126 2021    PROT 6.2 2021    LABALBU 2.7 2021    CALCIUM 9.0 2021    BILITOT 0.5 2021    ALKPHOS 87 2021    AST 26 2021    ALT 19 2021     Lab Results   Component Value Date    CRP 2.3 (H) 2021     Lab Results   Component Value Date    SEDRATE 87 (H) 2021     No results for input(s): CRP, PROCAL, FERRITIN, LDH, TROPONINI, DDIMER, FIBRINOGEN, INR, PROTIME, AST, ALT, TRIG in the last 72 hours.   Lab Results   Component Value Date    CHOL 164 2016    TRIG 170 2016    HDL 43 2016    LDLCALC 87 2016    LABVLDL 34 2016     Lab Results Component Value Date/Time    VITD25 12 (L) 12/28/2020 09:15 AM       Microbiology:   Recent Labs     02/19/21  1615   COVID19 Not Detected     Lab Results   Component Value Date    BC 24 Hours no growth 02/16/2021    BC Previously positive blood culture called 02/14/2021    [facility]  02/14/2021     Refer to previous culture for susceptibility results  of CXBL2 (LAC) collected 02/14/2021 at 19:07      BLOODCULT2 24 Hours no growth 02/16/2021    BLOODCULT2  02/14/2021     This isolate is presumed to be resistant based on the  detection of inducible Clindamycin resistance. Clindamycin  may still be effective in some patients. BLOODCULT2 5 Days no growth 12/26/2020    ORG Enterococcus faecalis 02/16/2021    ORG Anaerobic gram negative justo 02/16/2021    ORG Proteus mirabilis 02/16/2021    ORG Enterococcus faecalis 02/16/2021    ORG Staphylococcus aureus 02/16/2021    ORG Streptococcus anginosus 02/16/2021     WOUND/ABSCESS   Date Value Ref Range Status   02/14/2021   Final    Mixed laura isolated. Further workup and sensitivity testing  is not routinely indicated and will not be performed.   Mixed laura isolated includes:  Proteus species  Oxidase positive Gram negative rods  Nonhemolytic Strep species       Smear, Respiratory   Date Value Ref Range Status   01/01/2021   Final    Group 6: <25 PMN's/LPF and <25 Epithelial cells/LPF  Rare Polymorphonuclear leukocytes  Epithelial cells not seen  No organisms seen       No results found for: MPNEUMO, CLAMYDCU, LABLEGI, AFBCX, FUNGSM, LABFUNG    MRSA Culture Only   Date Value Ref Range Status   12/26/2020 Methicillin resistant Staph aureus not isolated  Final     CULTURE, RESPIRATORY   Date Value Ref Range Status   01/01/2021 Oral Pharyngeal Laura absent  Growth not present    Final     ASSESSMENT:  MSSA bacteremia   BILATERAL HEEL PRESSURE ULCERS   NECROTIC WOUND, OSTEOMYELITIS, Cellulitis left foot  S/p LEFT FOOT DEBRIDEMENT WITH BONE BIOPSY, WOUND VAC APPLICATION Bone biopsy left heel: Medullary bone, negative for osteomyelitis. UTI  - proteus   H/o vitamin D deficiency    2/14 wound cx Mixed Gram positive organisms   Proteus species   TTE Summary    Normal left ventricular size and systolic function.    Ejection fraction is visually estimated at 60-65%.    Indeterminate diastolic function.    No regional wall motion abnormalities seen.    Mild left ventricular concentric hypertrophy noted.    Normal right ventricular size and function.    The left atrium is mildly dilated.    Aortic valve leaflets are moderately calcified.    Aortic leaflets are thickened, likely due to degenerative changes, however    a small vegetation can not be excluded.    Mild aortic valve regurgitation.    No vegetations seen otherwise. LYRIC IS RECOMMENDED    Plan:     S/p angio  For LYRIC  Cont atbx  picc rue      vancomycin (VANCOCIN) 1,250 mg in dextrose 5 % 250 mL IVPB, Q12H    cefepime (MAXIPIME) 2000 mg IVPB extended (mini-bag), Q12H    allergic to pcn      Pt med rec can d/c when set up   f/u 2 weeks     Imaging and labs were reviewed per medical records. Pt had the opportunity to ask questions. All questions were answered. Thank you for involving me in the care of Esthela Wilkerson. Please do not hesitate to call for any questions or concerns.     Electronically signed by Binta Andres MD on 2/21/2021 at 5:33 PM    Phone [phone]  Fax [phone]

## 2021-02-21 NOTE — PROGRESS NOTES
[address] [place], [place] [address]   Progress Note    Admitting Date and Time: 2/14/2021  6:32 PM  Admit Dx: Diabetic foot infection ([address] [E11.628, L08.9]    Subjective/interval history:    Patient seen and examined. Denies any specific complaints at this time. Operative pain well controlled. Repeat blood cultures drawn on 2/16 showed no growth today. Cardiology consulted for transesophageal echocardiogram.    ROS: denies fever, chills, cp, sob, n/v, HA unless stated above.      vitamin D  50,000 Units Oral Weekly    sodium chloride flush  10 mL Intravenous 2 times per day    insulin lispro  3 Units Subcutaneous TID WC    insulin glargine  8 Units Subcutaneous Nightly    sodium chloride flush  10 mL Intravenous 2 times per day    enoxaparin  40 mg Subcutaneous Daily    atorvastatin  40 mg Oral Nightly    clopidogrel  75 mg Oral Daily    aspirin  81 mg Oral Daily    hydroCHLOROthiazide  25 mg Oral Daily    ipratropium-albuterol  3 mL Inhalation Q4H    pantoprazole  40 mg Oral QAM AC    vancomycin  1,250 mg Intravenous Q12H    cefepime  2,000 mg Intravenous Q12H    insulin lispro  0-6 Units Subcutaneous TID WC    insulin lispro  0-3 Units Subcutaneous Nightly         sodium chloride flush, 10 mL, PRN      iopamidol, 110 mL, ONCE PRN      oxyCODONE, 5 mg, Q4H PRN      cyclobenzaprine, 10 mg, TID PRN      sodium chloride flush, 10 mL, PRN      promethazine, 12.5 mg, Q6H PRN    Or      ondansetron, 4 mg, Q6H PRN      polyethylene glycol, 17 g, Daily PRN      acetaminophen, 650 mg, Q6H PRN    Or      acetaminophen, 650 mg, Q6H PRN      melatonin, 9 mg, Nightly PRN      glucose, 15 g, PRN      dextrose, 12.5 g, PRN      glucagon (rDNA), 1 mg, PRN      dextrose, 100 mL/hr, PRN         Objective:    /71   Pulse 94   Temp 98 °F (36.7 °C) (Oral)   Resp 16   Ht 5' 11\" (1.803 m)   Wt 182 lb (82.6 kg)   SpO2 95%   BMI 25.38 kg/m² IR ANGIOGRAM EXTREMITY LEFT   Final Result      VASCULAR REPORT   Final Result      VL LOWER EXTREMITY ARTERIAL SEGMENTAL PRESSURES W PPG BILATERAL   Final Result   1. Significant pressure gradient at the left thigh level that could be   associated with significant stenosis. 2. Normal right ankle brachial index at 1.01. The left MAHOGANY is mildly   decreased at 0.93. Bilateral decreased toe brachial indices is 0.29 on the   right and 0.28 on the left. 3. Abnormal PVRs at the metatarsal level, greater on the left and digital   level. Findings could be related to significant distal small vessel disease. 4. Correlation with imaging such as doppler ultrasound or CT angiography   could be considered. XR FOOT LEFT (2 VIEWS)   Final Result   No acute osseous abnormality. No definitive evidence for osteomyelitis. The osseous structures are osteopenic. XR CHEST PORTABLE   Final Result   No pneumonia or pleural effusion. Assessment/Plan:  Principal Problem:    Staphylococcus aureus bacteremia  Active Problems:    Diabetic foot infection ([facility]    Hypertension    Hemiparesis affecting left side as late effect of cerebrovascular accident [facility])    UTI (urinary tract infection)    Peripheral arterial disease ([facility]  Resolved Problems:    * No resolved hospital problems. *      1. Staph aureus bacteremia  -Currently on vancomycin and cefepime. Infectious disease following  -He also has Enterococcus faecalis growing from surgical wounds sensitive to current antibiotic coverage.  -Bone biopsy negative for osteomyelitis  -Plan is for transesophageal echocardiogram 2/22 per infectious disease recommendation; n.p.o. after midnight    2.   Diabetic foot infection/bilateral lower extremity cellulitis  -continue vancomycin and cefepime  -Post wound debridement and bone biopsy left foot  2/16  -Surgical cultures show Enterococcus faecalis, Proteus mirabilis, staph aureus, and staph anginosus

## 2021-02-21 NOTE — PROGRESS NOTES
Pharmacy Consultation Note  (Antibiotic Dosing and Monitoring)    Initial consult date: 2/15/2021  Consulting physician: Dr. Darrick Villarreal  Drug(s): Vancomycin  Indication: SSTI    Ht Readings from Last 1 Encounters:   02/15/21 5' 11\" (1.803 m)     Wt Readings from Last 1 Encounters:   02/15/21 182 lb (82.6 kg)     Age/  Gender IBW DW  Allergy Information   63 y.o.   male 75.3 kg 82.6 kg  Pcn [penicillins]          Date  Tmax WBC BUN/CR UOP  (mL/kg/hr) Drug/Dose Time   Given Level(s)   (Time) Comments   2/14  (#1) 99.6 8 30/0.8 -- Vancomycin 1250 mg IV x 1 2142     2/15  (#2) afebrile -- -- -- Vancomycin 1250 mg IV q12hr 1154  2215     2/16  (#3) afebrile 6.5 16/0.7 -- Vancomycin 1250 mg IV q12hr 1018  2245 Trough @ 2038 = 14.7 mcg/mL    2/17  (#4) afebrile 8.6 16/0.6 -- Vancomycin 1250 mg IV q12hr 1139  2220     2/18  (#5) afebrile 8.4 14/0.7 -- Vancomycin 1250 mg IV q12hr 0938  2117     2/19  (#6) afebrile 10.4 15/0.5 -- Vancomycin 1250 mg IV q12hr 1220  2345     2/20  (#7) afebrile 9.2 12/0.5 -- Vancomycin 1250 mg IV q12h 1342     2/21  (#8) afebrile 9.8 10/0.5 -- Vancomycin 1250 mg IV q12h 0018                             Estimated Creatinine Clearance: 165 mL/min (A) (based on SCr of 0.5 mg/dL (L)). UOP over the past 24 hours:       Intake/Output Summary (Last 24 hours) at 2/21/2021 0935  Last data filed at 2/20/2021 1729  Gross per 24 hour   Intake 720 ml   Output 0 ml   Net 720 ml     Other anti-infectives: Anti-infective Dose Date Initiated Date Stopped   Cefepime 2g IV q12hr 2/14      Cultures:  available culture and sensitivity results were reviewed in EPIC  Cultures sent and are pending.   Culture Date Result    Blood cx 1 2/14 MSSA   Blood cx 2 2/14 MSSA   Urine cx 2/14 >100,000 CFU/mL Proteus mirabilis   Wound cx 2/14 Growth present, evaluating for: Mixed GPO, Proteus species   Surgical cx  (Tissue) 2/16 Light growth: Proteus mirabilis  Moderate growth: E faecalis  Moderate growth: MSSA

## 2021-02-21 NOTE — PROGRESS NOTES
Foot and Ankle Surgery  Progress Note    History:  Patient seen bedside s/p left heel excisional debridement through level of deep fascia / muscle, I&D of left foot abscess, bone biopsy, and wound vac application (DOS 4/98). Patient states he continues to have movement of his left lower extremity uncontrolled. He notes this has made his dressings disheveled. No acute events overnight. Patient denies any N/V/D/F/C/SOB/CP. No other complaints at this time. Past Medical History:   Diagnosis Date    Cerebral artery occlusion with cerebral infarction ([facility]     Diabetes mellitus ([facility] [facility]     Type 2    Hemiparesis affecting left side as late effect of cerebrovascular accident ([facility] [address]     LEFT SIDE NON DOMINANT FOLLOWING STROKE    Hypertension      Past Surgical History:   Procedure Laterality Date    FINGER AMPUTATION      FOOT DEBRIDEMENT Left 2/16/2021    LEFT FOOT DEBRIDEMENT WITH BONE BIOPSY, WOUND VAC APPLICATION performed by Nakul Giordano DPM at [place] [address] ARTHROSCOPY      TONSILLECTOMY      UPPER GASTROINTESTINAL ENDOSCOPY N/A 1/4/2021    EGD BIOPSY performed by Cari Harding DO at [facility] reviewed. No pertinent family history. Allergies   Allergen Reactions    Pcn [Penicillins]        Objective  Vitals:    02/20/21 1715 02/20/21 1945 02/21/21 0505 02/21/21 0915   BP: 120/64  112/65 112/71   Pulse: 81 87 76 94   Resp: 20  16    Temp: 97.7 °F (36.5 °C)  98 °F (36.7 °C)    TempSrc: Oral  Oral    SpO2:  93% 95%    Weight:       Height:         Physical Exam:  NEUROLOGICAL EXAMINATION: Protective sensation absent to bilateral lower extremities. Epicritic sensation is intact. VASCULAR EXAMINATION:  DP and PT pulses are weakly palpable +1. Capillary refill is less than five seconds at the toes. Skin temperature is warm to cool proximal to distal.  There is absence of hair growth noted bilaterally. DERMATOLOGICAL:  There is full-thickness ulceration in the posterior left heel measuring approximately 4.5x6x0.5cm. Does probe to bone post-operatively. There is mild sanguinous drainage in canister. Mild surrounding erythema. No malodor, purulence, or fluctuance. Maceration plantarly. On the right heel, a granular wound, appears to be stable. Measures approximately 1.5cm in diameter and down to subQ tissue. No signs of infection. Mild erythema. MUSCULOSKELETAL:  Muscle strength is 3/5 to the foot and leg muscle groups on the left side, 4/5 on the right. No pain on compression of calves. No clinical signs of DVT. Severe pain on palpation of heels. Left LE involuntary contractions. Micro / Path:  No results for input(s): BC, ORG, CXSURG in the last 72 hours. Invalid input(s): BLOODCULT    Assessment:  1. S/p left heel excisional debridement through level of deep fascia / muscle, I&D of left foot abscess, bone biopsy, and wound vac application (DOS 3/10)  2. Left heel wound - POA, infected - resolving  3. Acute OM - biopsy negative for OM  4. Left foot cellulitis  5. Bacteremia - TTE neg for vegitations  6. PAD - Agram this AM  7. IDDM with neuropathy  8. Right heel wound - Stable, POA, not infected  9. UTI - Proteus    Plan:  - Local wound care via wound vac, peripheral vascular flow optimization and abx therapy. May require grafting procedure at some point in the future; date and time pending when patient is to be discharged. May be performed this admission if patient will still be in hospital. Otherwise, can be set up on out patient basis. - Vascular sx following. 2/19: PTA of L SFA / pop. Flow improved post-procedure with 3 vessel run off. - Abx at ID team discretion. Vanc, cefepime now. 6-8 weeks, PICC. Echo. Path neg for OM.  - Offloading of heels, very important. Must use offloading boots and/or float heels with padding under the legs while in bed. Wedge placed under left calf today. - Dressings: Left heel - wound vac, as per orders. Right heel - betadine with mepilex QOD.  - PT/OT when able. - NWB to L LE. Can WB to R LE in surgical shoe for transfers only. - Patient stable for DC from foot and ankle POV. - Follow up with Dr. Fermín Drummond in clinic within 1 week of discharge for further care.   - Will follow while in hospital    DW: Samira Wei DPM  Fellowship-Trained Foot and Ankle Surgeon  [phone]

## 2021-02-22 ENCOUNTER — ANESTHESIA EVENT (OUTPATIENT)
Dept: ENDOSCOPY | Age: 61
DRG: 622 | End: 2021-02-22
Payer: MEDICARE

## 2021-02-22 ENCOUNTER — ANESTHESIA (OUTPATIENT)
Dept: ENDOSCOPY | Age: 61
DRG: 622 | End: 2021-02-22
Payer: MEDICARE

## 2021-02-22 VITALS
SYSTOLIC BLOOD PRESSURE: 123 MMHG | DIASTOLIC BLOOD PRESSURE: 79 MMHG | RESPIRATION RATE: 16 BRPM | OXYGEN SATURATION: 92 %

## 2021-02-22 LAB
ANION GAP SERPL CALCULATED.3IONS-SCNC: 11 MMOL/L (ref 7–16)
BUN BLDV-MCNC: 12 MG/DL (ref 8–23)
CALCIUM SERPL-MCNC: 9.2 MG/DL (ref 8.6–10.2)
CHLORIDE BLD-SCNC: 98 MMOL/L (ref 98–107)
CO2: 24 MMOL/L (ref 22–29)
CREAT SERPL-MCNC: 0.5 MG/DL (ref 0.7–1.2)
GFR AFRICAN AMERICAN: >60
GFR NON-AFRICAN AMERICAN: >60 ML/MIN/1.73
GLUCOSE BLD-MCNC: 149 MG/DL (ref 74–99)
HCT VFR BLD CALC: 26 % (ref 37–54)
HEMOGLOBIN: 8.1 G/DL (ref 12.5–16.5)
MCH RBC QN AUTO: 25.6 PG (ref 26–35)
MCHC RBC AUTO-ENTMCNC: 31.2 % (ref 32–34.5)
MCV RBC AUTO: 82.3 FL (ref 80–99.9)
METER GLUCOSE: 151 MG/DL (ref 74–99)
METER GLUCOSE: 153 MG/DL (ref 74–99)
METER GLUCOSE: 162 MG/DL (ref 74–99)
METER GLUCOSE: 185 MG/DL (ref 74–99)
PDW BLD-RTO: 15.1 FL (ref 11.5–15)
PLATELET # BLD: 318 E9/L (ref 130–450)
PMV BLD AUTO: 9.5 FL (ref 7–12)
POTASSIUM SERPL-SCNC: 3.8 MMOL/L (ref 3.5–5)
RBC # BLD: 3.16 E12/L (ref 3.8–5.8)
SODIUM BLD-SCNC: 133 MMOL/L (ref 132–146)
WBC # BLD: 10.3 E9/L (ref 4.5–11.5)

## 2021-02-22 PROCEDURE — 93312 ECHO TRANSESOPHAGEAL: CPT

## 2021-02-22 PROCEDURE — 80048 BASIC METABOLIC PNL TOTAL CA: CPT

## 2021-02-22 PROCEDURE — 6370000000 HC RX 637 (ALT 250 FOR IP): Performed by: THORACIC SURGERY (CARDIOTHORACIC VASCULAR SURGERY)

## 2021-02-22 PROCEDURE — 85027 COMPLETE CBC AUTOMATED: CPT

## 2021-02-22 PROCEDURE — 6360000002 HC RX W HCPCS: Performed by: THORACIC SURGERY (CARDIOTHORACIC VASCULAR SURGERY)

## 2021-02-22 PROCEDURE — 6360000002 HC RX W HCPCS: Performed by: NURSE ANESTHETIST, CERTIFIED REGISTERED

## 2021-02-22 PROCEDURE — 97110 THERAPEUTIC EXERCISES: CPT

## 2021-02-22 PROCEDURE — 93325 DOPPLER ECHO COLOR FLOW MAPG: CPT

## 2021-02-22 PROCEDURE — 7100000011 HC PHASE II RECOVERY - ADDTL 15 MIN: Performed by: INTERNAL MEDICINE

## 2021-02-22 PROCEDURE — 2709999900 HC NON-CHARGEABLE SUPPLY: Performed by: INTERNAL MEDICINE

## 2021-02-22 PROCEDURE — 93325 DOPPLER ECHO COLOR FLOW MAPG: CPT | Performed by: INTERNAL MEDICINE

## 2021-02-22 PROCEDURE — 99233 SBSQ HOSP IP/OBS HIGH 50: CPT | Performed by: INTERNAL MEDICINE

## 2021-02-22 PROCEDURE — 2580000003 HC RX 258: Performed by: PODIATRIST

## 2021-02-22 PROCEDURE — 2580000003 HC RX 258: Performed by: THORACIC SURGERY (CARDIOTHORACIC VASCULAR SURGERY)

## 2021-02-22 PROCEDURE — 3700000001 HC ADD 15 MINUTES (ANESTHESIA): Performed by: INTERNAL MEDICINE

## 2021-02-22 PROCEDURE — 6360000002 HC RX W HCPCS: Performed by: PODIATRIST

## 2021-02-22 PROCEDURE — 36592 COLLECT BLOOD FROM PICC: CPT

## 2021-02-22 PROCEDURE — B24BZZ4 ULTRASONOGRAPHY OF HEART WITH AORTA, TRANSESOPHAGEAL: ICD-10-PCS | Performed by: INTERNAL MEDICINE

## 2021-02-22 PROCEDURE — 1200000000 HC SEMI PRIVATE

## 2021-02-22 PROCEDURE — 97606 NEG PRS WND THER DME>50 SQCM: CPT

## 2021-02-22 PROCEDURE — 93312 ECHO TRANSESOPHAGEAL: CPT | Performed by: INTERNAL MEDICINE

## 2021-02-22 PROCEDURE — 36415 COLL VENOUS BLD VENIPUNCTURE: CPT

## 2021-02-22 PROCEDURE — 7100000010 HC PHASE II RECOVERY - FIRST 15 MIN: Performed by: INTERNAL MEDICINE

## 2021-02-22 PROCEDURE — 3700000000 HC ANESTHESIA ATTENDED CARE: Performed by: INTERNAL MEDICINE

## 2021-02-22 PROCEDURE — 2580000003 HC RX 258: Performed by: NURSE ANESTHETIST, CERTIFIED REGISTERED

## 2021-02-22 PROCEDURE — 82962 GLUCOSE BLOOD TEST: CPT

## 2021-02-22 RX ORDER — SODIUM CHLORIDE 9 MG/ML
INJECTION, SOLUTION INTRAVENOUS CONTINUOUS PRN
Status: DISCONTINUED | OUTPATIENT
Start: 2021-02-22 | End: 2021-02-22 | Stop reason: SDUPTHER

## 2021-02-22 RX ORDER — PROPOFOL 10 MG/ML
INJECTION, EMULSION INTRAVENOUS CONTINUOUS PRN
Status: DISCONTINUED | OUTPATIENT
Start: 2021-02-22 | End: 2021-02-22 | Stop reason: SDUPTHER

## 2021-02-22 RX ADMIN — INSULIN LISPRO 3 UNITS: 100 INJECTION, SOLUTION INTRAVENOUS; SUBCUTANEOUS at 17:23

## 2021-02-22 RX ADMIN — ENOXAPARIN SODIUM 40 MG: 40 INJECTION SUBCUTANEOUS at 11:38

## 2021-02-22 RX ADMIN — CLOPIDOGREL BISULFATE 75 MG: 75 TABLET ORAL at 11:45

## 2021-02-22 RX ADMIN — INSULIN LISPRO 1 UNITS: 100 INJECTION, SOLUTION INTRAVENOUS; SUBCUTANEOUS at 21:50

## 2021-02-22 RX ADMIN — INSULIN GLARGINE 8 UNITS: 100 INJECTION, SOLUTION SUBCUTANEOUS at 21:50

## 2021-02-22 RX ADMIN — SODIUM CHLORIDE: 9 INJECTION, SOLUTION INTRAVENOUS at 10:15

## 2021-02-22 RX ADMIN — PROPOFOL 100 MCG/KG/MIN: 10 INJECTION, EMULSION INTRAVENOUS at 10:15

## 2021-02-22 RX ADMIN — CYCLOBENZAPRINE 10 MG: 10 TABLET, FILM COATED ORAL at 21:30

## 2021-02-22 RX ADMIN — SODIUM CHLORIDE, POTASSIUM CHLORIDE, SODIUM LACTATE AND CALCIUM CHLORIDE: 600; 310; 30; 20 INJECTION, SOLUTION INTRAVENOUS at 11:38

## 2021-02-22 RX ADMIN — INSULIN LISPRO 1 UNITS: 100 INJECTION, SOLUTION INTRAVENOUS; SUBCUTANEOUS at 17:23

## 2021-02-22 RX ADMIN — OXYCODONE 5 MG: 5 TABLET ORAL at 11:37

## 2021-02-22 RX ADMIN — HYDROCHLOROTHIAZIDE 25 MG: 25 TABLET ORAL at 11:39

## 2021-02-22 RX ADMIN — INSULIN LISPRO 1 UNITS: 100 INJECTION, SOLUTION INTRAVENOUS; SUBCUTANEOUS at 11:46

## 2021-02-22 RX ADMIN — CEFEPIME 2000 MG: 2 INJECTION, POWDER, FOR SOLUTION INTRAMUSCULAR; INTRAVENOUS at 17:25

## 2021-02-22 RX ADMIN — OXYCODONE 5 MG: 5 TABLET ORAL at 17:24

## 2021-02-22 RX ADMIN — OXYCODONE 5 MG: 5 TABLET ORAL at 21:30

## 2021-02-22 RX ADMIN — ASPIRIN 81 MG: 81 TABLET, COATED ORAL at 11:37

## 2021-02-22 RX ADMIN — SODIUM CHLORIDE, POTASSIUM CHLORIDE, SODIUM LACTATE AND CALCIUM CHLORIDE: 600; 310; 30; 20 INJECTION, SOLUTION INTRAVENOUS at 21:29

## 2021-02-22 RX ADMIN — SODIUM CHLORIDE: 9 INJECTION, SOLUTION INTRAVENOUS at 21:29

## 2021-02-22 RX ADMIN — INSULIN LISPRO 3 UNITS: 100 INJECTION, SOLUTION INTRAVENOUS; SUBCUTANEOUS at 11:52

## 2021-02-22 RX ADMIN — Medication 9 MG: at 21:30

## 2021-02-22 RX ADMIN — SODIUM CHLORIDE: 9 INJECTION, SOLUTION INTRAVENOUS at 09:05

## 2021-02-22 RX ADMIN — CEFEPIME 2000 MG: 2 INJECTION, POWDER, FOR SOLUTION INTRAMUSCULAR; INTRAVENOUS at 04:55

## 2021-02-22 RX ADMIN — VANCOMYCIN HYDROCHLORIDE 1250 MG: 10 INJECTION, POWDER, LYOPHILIZED, FOR SOLUTION INTRAVENOUS at 12:04

## 2021-02-22 RX ADMIN — ATORVASTATIN CALCIUM 40 MG: 40 TABLET, FILM COATED ORAL at 21:30

## 2021-02-22 ASSESSMENT — PAIN SCALES - GENERAL
PAINLEVEL_OUTOF10: 0
PAINLEVEL_OUTOF10: 4
PAINLEVEL_OUTOF10: 8
PAINLEVEL_OUTOF10: 8
PAINLEVEL_OUTOF10: 0

## 2021-02-22 ASSESSMENT — PAIN DESCRIPTION - PAIN TYPE: TYPE: ACUTE PAIN

## 2021-02-22 ASSESSMENT — PAIN DESCRIPTION - ORIENTATION: ORIENTATION: RIGHT;LEFT

## 2021-02-22 ASSESSMENT — PAIN DESCRIPTION - DESCRIPTORS: DESCRIPTORS: ACHING;DISCOMFORT;SORE

## 2021-02-22 ASSESSMENT — PAIN DESCRIPTION - LOCATION: LOCATION: FOOT;LEG

## 2021-02-22 NOTE — PROGRESS NOTES
[address] [facility]  [facility]  Progress Note    NAME: Stefan Posey  MR:  [id number]  :   1960  DATE OF SERVICE:21    This is a face to face encounter with Stefan Posey 63 y.o. male on 21  ID following for   Chief Complaint   Patient presents with    Fever     102.2 at [facility] rehab NF, 101.7 for EMS    Wound Check     BILAT HEELS X FEW WEEKS     SUBJECTIVE:  In bed supined  No new events     Feels ok   vacc on      Patient is tolerating medications. No reported adverse drug reactions. Review of systems:  As stated above in the chief complaint, otherwise negative.     Medications:  Scheduled Meds:   vitamin D  50,000 Units Oral Weekly    sodium chloride flush  10 mL Intravenous 2 times per day    insulin lispro  3 Units Subcutaneous TID WC    insulin glargine  8 Units Subcutaneous Nightly    sodium chloride flush  10 mL Intravenous 2 times per day    enoxaparin  40 mg Subcutaneous Daily    atorvastatin  40 mg Oral Nightly    clopidogrel  75 mg Oral Daily    aspirin  81 mg Oral Daily    hydroCHLOROthiazide  25 mg Oral Daily    ipratropium-albuterol  3 mL Inhalation Q4H    pantoprazole  40 mg Oral QAM AC    vancomycin  1,250 mg Intravenous Q12H    cefepime  2,000 mg Intravenous Q12H    insulin lispro  0-6 Units Subcutaneous TID WC    insulin lispro  0-3 Units Subcutaneous Nightly     Continuous Infusions:   lactated ringers 100 mL/hr at 21 1138    sodium chloride Stopped (21 1010)    dextrose       PRN Meds:sodium chloride flush, iopamidol, oxyCODONE, cyclobenzaprine, sodium chloride flush, promethazine **OR** ondansetron, polyethylene glycol, acetaminophen **OR** acetaminophen, melatonin, glucose, dextrose, glucagon (rDNA), dextrose    OBJECTIVE:  /62   Pulse 83   Temp 97.9 °F (36.6 °C)   Resp 16   Ht 5' 11\" (1.803 m)   Wt 182 lb (82.6 kg)   SpO2 96%   BMI 25.38 kg/m²   Temp  Av.8 °F (36.6 °C)  Min: 97.8 °F (36.6 °C)  Max: 97.9 °F (36.6 °C)  Constitutional:  The patient is awake  nad  Skin:    Warm and dry. HEENT:      AT/NC drys skin face  Chest:   Symmetrical expansion. cta ant no wheeze  Cardiovascular:  S1 and S2 are rhythmic and regular. Abdomen:   Positive bowel sounds to auscultation. Benign to palpation. Extremities:    Right vacc right groin dressed dry no swelling   CNS    AAxO   Lines: picc rue 2/18  Radiology:  Laboratory and Tests Review:  Lab Results   Component Value Date    WBC 10.3 02/22/2021    WBC 9.8 02/21/2021    WBC 9.2 02/20/2021    HGB 8.1 (L) 02/22/2021    HCT 26.0 (L) 02/22/2021    MCV 82.3 02/22/2021     02/22/2021     No results found for: CRPHS  Lab Results   Component Value Date    ALT 19 02/16/2021    AST 26 02/16/2021    ALKPHOS 87 02/16/2021    BILITOT 0.5 02/16/2021     Lab Results   Component Value Date     02/22/2021    K 3.8 02/22/2021    K 3.8 02/16/2021    CL 98 02/22/2021    CO2 24 02/22/2021    BUN 12 02/22/2021    CREATININE 0.5 02/22/2021    CREATININE 0.5 02/21/2021    CREATININE 0.5 02/20/2021    GFRAA >60 02/22/2021    LABGLOM >60 02/22/2021    GLUCOSE 149 02/22/2021    PROT 6.2 02/16/2021    LABALBU 2.7 02/16/2021    CALCIUM 9.2 02/22/2021    BILITOT 0.5 02/16/2021    ALKPHOS 87 02/16/2021    AST 26 02/16/2021    ALT 19 02/16/2021     Lab Results   Component Value Date    CRP 2.3 (H) 02/14/2021     Lab Results   Component Value Date    SEDRATE 87 (H) 02/14/2021     No results for input(s): CRP, PROCAL, FERRITIN, LDH, TROPONINI, DDIMER, FIBRINOGEN, INR, PROTIME, AST, ALT, TRIG in the last 72 hours.   Lab Results   Component Value Date    CHOL 164 03/01/2016    TRIG 170 03/01/2016    HDL 43 03/01/2016    LDLCALC 87 03/01/2016    LABVLDL 34 03/01/2016     Lab Results   Component Value Date/Time    VITD25 12 (L) 12/28/2020 09:15 AM       Microbiology:   Recent Labs     02/19/21  1615   COVID19 Not Detected     Lab Results   Component Value Date    BC 5 Days no growth 02/16/2021    BC Previously positive blood culture called 02/14/2021    [facility]  02/14/2021     Refer to previous culture for susceptibility results  of CXBL2 (LAC) collected 02/14/2021 at 19:07      BLOODCULT2 5 Days no growth 02/16/2021    BLOODCULT2  02/14/2021     This isolate is presumed to be resistant based on the  detection of inducible Clindamycin resistance. Clindamycin  may still be effective in some patients. BLOODCULT2 5 Days no growth 12/26/2020    ORG Enterococcus faecalis 02/16/2021    ORG Anaerobic gram negative justo 02/16/2021    ORG Proteus mirabilis 02/16/2021    ORG Enterococcus faecalis 02/16/2021    ORG Staphylococcus aureus 02/16/2021    ORG Streptococcus anginosus 02/16/2021     WOUND/ABSCESS   Date Value Ref Range Status   02/14/2021   Final    Mixed laura isolated. Further workup and sensitivity testing  is not routinely indicated and will not be performed. Mixed laura isolated includes:  Proteus species  Oxidase positive Gram negative rods  Nonhemolytic Strep species       Smear, Respiratory   Date Value Ref Range Status   01/01/2021   Final    Group 6: <25 PMN's/LPF and <25 Epithelial cells/LPF  Rare Polymorphonuclear leukocytes  Epithelial cells not seen  No organisms seen       No results found for: MPNEUMO, CLAMYDCU, LABLEGI, AFBCX, FUNGSM, LABFUNG    MRSA Culture Only   Date Value Ref Range Status   12/26/2020 Methicillin resistant Staph aureus not isolated  Final     CULTURE, RESPIRATORY   Date Value Ref Range Status   01/01/2021 Oral Pharyngeal Laura absent  Growth not present    Final     ASSESSMENT:  MSSA bacteremia   BILATERAL HEEL PRESSURE ULCERS   NECROTIC WOUND, OSTEOMYELITIS, Cellulitis left foot  S/p LEFT FOOT DEBRIDEMENT WITH BONE BIOPSY, WOUND VAC APPLICATION  Bone biopsy left heel: Medullary bone, negative for osteomyelitis.   S/p angio    UTI  - proteus   H/o vitamin D deficiency    2/14 wound cx Mixed Gram positive organisms   Proteus species   TTE Summary  Normal left ventricular size and systolic function.    Ejection fraction is visually estimated at 60-65%.    Indeterminate diastolic function.    No regional wall motion abnormalities seen.    Mild left ventricular concentric hypertrophy noted.    Normal right ventricular size and function.    The left atrium is mildly dilated.    Aortic valve leaflets are moderately calcified.    Aortic leaflets are thickened, likely due to degenerative changes, however    a small vegetation can not be excluded.    Mild aortic valve regurgitation.    No vegetations seen otherwise. LYRIC IS RECOMMENDED    Plan: For LYRIC         vancomycin (VANCOCIN) 1,250 mg in dextrose 5 % 250 mL IVPB, Q12H    cefepime (MAXIPIME) 2000 mg IVPB extended (mini-bag), Q12H    allergic to pcn      Pt med rec can d/c when set up   f/u 2 weeks     Imaging and labs were reviewed per medical records. Pt had the opportunity to ask questions. All questions were answered. Thank you for involving me in the care of Heladio Navas. Please do not hesitate to call for any questions or concerns.     Electronically signed by Lisa Wellington MD on 2/22/2021 at 3:15 PM    Phone [phone]  Fax [phone]

## 2021-02-22 NOTE — PROGRESS NOTES
Foot and Ankle Surgery  Progress Note    History:  Patient seen bedside s/p left heel excisional debridement through level of deep fascia / muscle, I&D of left foot abscess, bone biopsy, and wound vac application (DOS 4/59). Patient states he continues to have movement of his left lower extremity uncontrolled. He notes no pain to the heels today. No acute events overnight. Patient denies any N/V/D/F/C/SOB/CP. No other complaints at this time. Past Medical History:   Diagnosis Date    Cerebral artery occlusion with cerebral infarction ([address]     Diabetes mellitus ([facility]     Type 2    Hemiparesis affecting left side as late effect of cerebrovascular accident ([facility] [address]     LEFT SIDE NON DOMINANT FOLLOWING STROKE    Hypertension      Past Surgical History:   Procedure Laterality Date    FINGER AMPUTATION      FOOT DEBRIDEMENT Left 2/16/2021    LEFT FOOT DEBRIDEMENT WITH BONE BIOPSY, WOUND VAC APPLICATION performed by Smith Rothman DPM at [place] [address] ARTHROSCOPY      TONSILLECTOMY      UPPER GASTROINTESTINAL ENDOSCOPY N/A 1/4/2021    EGD BIOPSY performed by Remi Lew DO at [facility] reviewed. No pertinent family history. Allergies   Allergen Reactions    Pcn [Penicillins]        Objective  Vitals:    02/22/21 1025 02/22/21 1027 02/22/21 1041 02/22/21 1055   BP: 106/79 106/79 100/60 100/62   Pulse: 99 106 87 83   Resp: 16 14 16 16   Temp: 97.9 °F (36.6 °C)      TempSrc:       SpO2: 97% 99% 95% 96%   Weight:       Height:         Physical Exam:  NEUROLOGICAL EXAMINATION: Protective sensation absent to bilateral lower extremities. Epicritic sensation is intact. VASCULAR EXAMINATION:  DP and PT pulses are weakly palpable +1. Capillary refill is less than five seconds at the toes. Skin temperature is warm to cool proximal to distal.  There is absence of hair growth noted bilaterally.   DERMATOLOGICAL:  There is full-thickness ulceration in the posterior left heel measuring mepilex QOD.  - PT/OT when able. - NWB to L LE. Can WB to R LE in surgical shoe for transfers only. - Patient stable for DC from foot and ankle POV. - Follow up with Dr. Jarrod Nava in clinic within 1 week of discharge for further care.   - Will follow while in hospital    DW: Jm Newman DPM  Fellowship-Trained Foot and Ankle Surgeon  [phone]

## 2021-02-22 NOTE — ANESTHESIA PRE PROCEDURE
Department of Anesthesiology  Preprocedure Note       Name:  Twila Will   Age:  63 y.o.  :  1960                                          MRN:  [id number]         Date:  2021      Surgeon: Tank Wall):  Sis Young MD    Procedure: Procedure(s):  TRANSESOPHAGEAL ECHOCARDIOGRAM    Medications prior to admission:   Prior to Admission medications    Medication Sig Start Date End Date Taking? Authorizing Provider   oxyCODONE (ROXICODONE) 5 MG immediate release tablet Take 1 tablet by mouth every 4 hours as needed for Pain for up to 5 days. 21 Yes Lamont Wells DO   enoxaparin (LOVENOX) 40 MG/0.4ML injection Inject 0.4 mLs into the skin daily 21  Yes Lamont Wells DO   glucose (GLUTOSE) 40 % GEL Take 37.5 mLs by mouth as needed (hypoglycemia) 21  Yes Lamont Wells DO   glucagon, rDNA, 1 MG injection Inject 1 mg into the muscle as needed for Low blood sugar (Blood glucose less than 70 mg/dL and patient NOT ALERT or NPO and does not have IV access.) 21 Yes Lamont Wells DO   cyclobenzaprine (FLEXERIL) 10 MG tablet Take 1 tablet by mouth 3 times daily as needed for Muscle spasms 2/21/21 3/3/21 Yes Lamont Wells DO   cefepime (MAXIPIME) infusion Infuse 1,000 mg intravenously every 12 hours Compound per protocol 21 Yes Pily Hogan MD   vancomycin (VANCOCIN) infusion Infuse 1,250 mg intravenously every 12 hours Compound per protocol.  21 Yes Pily Hogan MD   vitamin D (ERGOCALCIFEROL) 1.25 MG (93841 UT) CAPS capsule Take 1 capsule by mouth once a week 21  Yes Joe Soares MD   ipratropium-albuterol (DUONEB) 0.5-2.5 (3) MG/3ML SOLN nebulizer solution Inhale 3 mLs into the lungs every 4 hours 21  Yes Joe Soares MD   pantoprazole (PROTONIX) 40 MG tablet Take 1 tablet by mouth every morning (before breakfast) 21  Yes Joe Soares MD melatonin 3 MG TABS tablet Take 9 mg by mouth nightly as needed   Yes Historical Provider, MD   aspirin 81 MG EC tablet Take 81 mg by mouth daily   Yes Historical Provider, MD   hydroCHLOROthiazide (HYDRODIURIL) 25 MG tablet Take 25 mg by mouth daily   Yes Historical Provider, MD   glimepiride (AMARYL) 2 MG tablet Take 1 tablet by mouth daily (with breakfast) 11/16/15  Yes Rut Gracia DO   insulin lispro (HUMALOG) 100 UNIT/ML injection vial Inject 0-12 Units into the skin 3 times daily (with meals) 11/16/15  Yes Rut Gracia DO   insulin lispro (HUMALOG) 100 UNIT/ML injection vial Inject 0-6 Units into the skin nightly 11/16/15  Yes Rut Gracia DO   metFORMIN (GLUCOPHAGE) 500 MG tablet Take 1 tablet by mouth 2 times daily (with meals) 11/16/15  Yes Rut Gracia DO   atorvastatin (LIPITOR) 40 MG tablet Take 1 tablet by mouth nightly 11/16/15  Yes Rut Gracia DO   clopidogrel (PLAVIX) 75 MG tablet Take 1 tablet by mouth daily 11/16/15  Yes Rut Gracia DO   vitamin B-1 100 MG tablet Take 1 tablet by mouth daily 11/16/15  Yes Rut Gracia DO       Current medications:    Current Facility-Administered Medications   Medication Dose Route Frequency Provider Last Rate Last Admin    vitamin D (ERGOCALCIFEROL) capsule 50,000 Units  50,000 Units Oral Weekly Tobi Cobos MD   50,000 Units at 02/19/21 1536    lactated ringers infusion   Intravenous Continuous Lamont Wells  mL/hr at 02/20/21 1120 New Bag at 02/20/21 1120    sodium chloride flush 0.9 % injection 10 mL  10 mL Intravenous 2 times per day Tobi Cobos MD   Stopped at 02/22/21 0830    sodium chloride flush 0.9 % injection 10 mL  10 mL Intravenous PRN Tobi Cobos MD        iopamidol (ISOVUE-370) 76 % injection 110 mL  110 mL Intravenous ONCE PRN Dimitrios Walton MD        insulin lispro (HUMALOG) injection vial 3 Units  3 Units Subcutaneous TID  Lamont Wells DO   Stopped at 02/22/21 0830  insulin glargine (LANTUS) injection vial 8 Units  8 Units Subcutaneous Nightly Lamont Wells, DO   8 Units at 02/21/21 2019    oxyCODONE (ROXICODONE) immediate release tablet 5 mg  5 mg Oral Q4H PRN Lamont Wells, DO   5 mg at 02/21/21 2152    cyclobenzaprine (FLEXERIL) tablet 10 mg  10 mg Oral TID PRN Lamont Wells, DO   10 mg at 02/21/21 2016    sodium chloride flush 0.9 % injection 10 mL  10 mL Intravenous 2 times per day Rober X Fahim, DPM   10 mL at 02/20/21 2102    sodium chloride flush 0.9 % injection 10 mL  10 mL Intravenous PRN Rober X Fahim, DPM        enoxaparin (LOVENOX) injection 40 mg  40 mg Subcutaneous Daily Rober X Fahim, DPM   40 mg at 02/21/21 0914    promethazine (PHENERGAN) tablet 12.5 mg  12.5 mg Oral Q6H PRN Rober X Fahim, DPM        Or    ondansetron (ZOFRAN) injection 4 mg  4 mg Intravenous Q6H PRN Rober X Fahim, DPM        polyethylene glycol (GLYCOLAX) packet 17 g  17 g Oral Daily PRN Rober X Fahim, DPM        acetaminophen (TYLENOL) tablet 650 mg  650 mg Oral Q6H PRN Rober X Fahim, DPM   650 mg at 02/15/21 2035    Or    acetaminophen (TYLENOL) suppository 650 mg  650 mg Rectal Q6H PRN Rober X Fahim, DPM        atorvastatin (LIPITOR) tablet 40 mg  40 mg Oral Nightly Rober X Fahim, DPM   40 mg at 02/21/21 2016    clopidogrel (PLAVIX) tablet 75 mg  75 mg Oral Daily Rober X Fahim, DPM   75 mg at 02/21/21 0914    aspirin EC tablet 81 mg  81 mg Oral Daily Rober X Fahim, DPM   81 mg at 02/21/21 0913    hydroCHLOROthiazide (HYDRODIURIL) tablet 25 mg  25 mg Oral Daily Rober X Fahim, DPM   25 mg at 02/21/21 0915    melatonin tablet 9 mg  9 mg Oral Nightly PRN Rober X Fahim, DPM   9 mg at 02/21/21 2016    ipratropium-albuterol (DUONEB) nebulizer solution 3 mL  3 mL Inhalation Q4H Rober Lorenzom, DPM   3 mL at 02/16/21 0422    pantoprazole (PROTONIX) tablet 40 mg  40 mg Oral QAM AC Rboer Lorenzom, DPM   40 mg at 02/21/21 0580  vancomycin (VANCOCIN) 1,250 mg in dextrose 5 % 250 mL IVPB  1,250 mg Intravenous Q12H Rober X Fahim, DPM   Stopped at 02/22/21 0136    cefepime (MAXIPIME) 2000 mg IVPB extended (mini-bag)  2,000 mg Intravenous Q12H Rober X Fahim, DPM   Stopped at 02/22/21 0906    0.9 % sodium chloride infusion   Intravenous Q12H Rober X Fahim, DPM   Stopped at 02/22/21 1010    insulin lispro (HUMALOG) injection vial 0-6 Units  0-6 Units Subcutaneous TID WC Rober X Fahim, DPM   Stopped at 02/22/21 0829    insulin lispro (HUMALOG) injection vial 0-3 Units  0-3 Units Subcutaneous Nightly Rober X Fahim, DPM   1 Units at 02/21/21 2018    glucose (GLUTOSE) 40 % oral gel 15 g  15 g Oral PRN Rober X Fahim, DPM        dextrose 50 % IV solution  12.5 g Intravenous PRN Rober X Fahim, DPM        glucagon (rDNA) injection 1 mg  1 mg Intramuscular PRN Rober X Fahim, DPM        dextrose 5 % solution  100 mL/hr Intravenous PRN Rober X Fahim, DPM           Allergies:     Allergies   Allergen Reactions    Pcn [Penicillins]        Problem List:    Patient Active Problem List   Diagnosis Code    CVA (cerebral vascular accident) ([facility] I63.9    Diabetes mellitus ([facility] E11.9    Hyponatremia E87.1    Diabetic foot infection ([facility] E11.628, L08.9    Hypertension I10    Hemiparesis affecting left side as late effect of cerebrovascular accident ([facility] I69.354    UTI (urinary tract infection) N39.0    Staphylococcus aureus bacteremia R78.81, B95.61    Peripheral arterial disease ([facility] I73.9       Past Medical History:        Diagnosis Date    Cerebral artery occlusion with cerebral infarction ([facility]     Diabetes mellitus ([facility]     Type 2    Hemiparesis affecting left side as late effect of cerebrovascular accident ([facility]     LEFT SIDE NON DOMINANT FOLLOWING STROKE    Hypertension        Past Surgical History:        Procedure Laterality Date    FINGER AMPUTATION      FOOT DEBRIDEMENT Left 2/16/2021 LEFT FOOT DEBRIDEMENT WITH BONE BIOPSY, WOUND VAC APPLICATION performed by Ernestine Qureshi DPM at [facility] ARTHROSCOPY      TONSILLECTOMY      UPPER GASTROINTESTINAL ENDOSCOPY N/A 1/4/2021    EGD BIOPSY performed by Adalid Green DO at [address] History:    Social History     Tobacco Use    Smoking status: Former Smoker    Smokeless tobacco: Never Used   Substance Use Topics    Alcohol use: Yes     Frequency: 4 or more times a week     Drinks per session: 5 or 6     Binge frequency: Daily or almost daily     Comment: every day drinker for most of adult life                                Counseling given: Not Answered      Vital Signs (Current):   Vitals:    02/21/21 2152 02/22/21 0525 02/22/21 0800 02/22/21 0926   BP:  116/63     Pulse: 89 74 75    Resp:  16     Temp:  97.8 °F (36.6 °C)     TempSrc:  Oral     SpO2: 98% 98% 96%    Weight:       Height:    5' 11\" (1.803 m)                                              BP Readings from Last 3 Encounters:   02/22/21 116/63   02/16/21 (!) 91/49   01/05/21 129/69       NPO Status:                                                                                 BMI:   Wt Readings from Last 3 Encounters:   02/15/21 182 lb (82.6 kg)   12/26/20 208 lb 1.6 oz (94.4 kg)   06/18/20 194 lb (88 kg)     Body mass index is 25.38 kg/m².     CBC:   Lab Results   Component Value Date    WBC 10.3 02/22/2021    RBC 3.16 02/22/2021    HGB 8.1 02/22/2021    HCT 26.0 02/22/2021    MCV 82.3 02/22/2021    RDW 15.1 02/22/2021     02/22/2021       CMP:   Lab Results   Component Value Date     02/22/2021    K 3.8 02/22/2021    K 3.8 02/16/2021    CL 98 02/22/2021    CO2 24 02/22/2021    BUN 12 02/22/2021    CREATININE 0.5 02/22/2021    GFRAA >60 02/22/2021    LABGLOM >60 02/22/2021    GLUCOSE 149 02/22/2021    PROT 6.2 02/16/2021    CALCIUM 9.2 02/22/2021    BILITOT 0.5 02/16/2021    ALKPHOS 87 02/16/2021    AST 26 02/16/2021    ALT 19 02/16/2021 POC Tests: No results for input(s): POCGLU, POCNA, POCK, POCCL, POCBUN, POCHEMO, POCHCT in the last 72 hours. Coags:   Lab Results   Component Value Date    PROTIME 21.8 01/01/2021    INR 1.9 01/01/2021    APTT 34.1 11/15/2015       HCG (If Applicable): No results found for: PREGTESTUR, PREGSERUM, HCG, HCGQUANT     ABGs: No results found for: PHART, PO2ART, TXU7ZDD, KFB4WMA, BEART, W7ASPZGQ     Type & Screen (If Applicable):  No results found for: LABABO, LABRH    Drug/Infectious Status (If Applicable):  No results found for: HIV, HEPCAB    COVID-19 Screening (If Applicable):   Lab Results   Component Value Date    COVID19 Not Detected 02/19/2021         Anesthesia Evaluation  Patient summary reviewed  Airway: Mallampati: III  TM distance: >3 FB   Neck ROM: full  Mouth opening: > = 3 FB Dental: normal exam         Pulmonary:Negative Pulmonary ROS breath sounds clear to auscultation                             Cardiovascular:    (+) hypertension:,         Rhythm: regular  Rate: normal                    Neuro/Psych:   (+) CVA:,             GI/Hepatic/Renal:             Endo/Other:    (+) DiabetesType II DM, using insulin, . Abdominal:       Abdomen: soft. Vascular:                                        Anesthesia Plan      MAC     ASA 3       Induction: intravenous. Anesthetic plan and risks discussed with patient. Plan discussed with CRNA.                   Cory Godinez MD   2/22/2021

## 2021-02-22 NOTE — PROGRESS NOTES
Pharmacy Consultation Note  (Antibiotic Dosing and Monitoring)    Initial consult date: 2/15/2021  Consulting physician: Dr. Dori Ying  Drug(s): Vancomycin  Indication: SSTI    Ht Readings from Last 1 Encounters:   02/22/21 5' 11\" (1.803 m)     Wt Readings from Last 1 Encounters:   02/15/21 182 lb (82.6 kg)     Age/  Gender IBW DW  Allergy Information   63 y.o.   male 75.3 kg 82.6 kg  Pcn [penicillins]          Date  Tmax WBC BUN/CR UOP  (mL/kg/hr) Drug/Dose Time   Given Level(s)   (Time) Comments   2/14  (#1) 99.6 8 30/0.8 -- Vancomycin 1250 mg IV x 1 2142     2/15  (#2) afebrile -- -- -- Vancomycin 1250 mg IV q12hr 1154  2215     2/16  (#3) afebrile 6.5 16/0.7 -- Vancomycin 1250 mg IV q12hr 1018  2245 Trough @ 2038 = 14.7 mcg/mL    2/17  (#4) afebrile 8.6 16/0.6 -- Vancomycin 1250 mg IV q12hr 1139  2220     2/18  (#5) afebrile 8.4 14/0.7 -- Vancomycin 1250 mg IV q12hr 0938  2117     2/19  (#6) afebrile 10.4 15/0.5 -- Vancomycin 1250 mg IV q12hr 1220  2345     2/20  (#7) afebrile 9.2 12/0.5 -- Vancomycin 1250 mg IV q12h 1342 Trough @ 2315 = 14.8 mcg/mL    2/21  (#8) afebrile 9.8 10/0.5 -- Vancomycin 1250 mg IV q12h 0018  1330  2359     2/22  (#9) afebrile 10.3 12/0.5 -- Vancomycin 1250 mg IV q12h <1215>                   Estimated Creatinine Clearance: 165 mL/min (A) (based on SCr of 0.5 mg/dL (L)). UOP over the past 24 hours:       Intake/Output Summary (Last 24 hours) at 2/22/2021 1031  Last data filed at 2/22/2021 1025  Gross per 24 hour   Intake 830 ml   Output 0 ml   Net 830 ml     Other anti-infectives: Anti-infective Dose Date Initiated Date Stopped   Cefepime 2g IV q12hr 2/14      Cultures:  available culture and sensitivity results were reviewed in EPIC  Cultures sent and are pending.   Culture Date Result    Blood cx 1 2/14 MSSA   Blood cx 2 2/14 MSSA   Urine cx 2/14 >100,000 CFU/mL Proteus mirabilis   Wound cx 2/14 Growth present, evaluating for: Mixed GPO, Proteus species   Surgical cx (Tissue) 2/16 Light growth: Proteus mirabilis  Moderate growth: E faecalis  Moderate growth: MSSA  Moderate growth: MSSanginosus   Surgical cx  (Bone) 2/16 Rare growth: E faecalis   Anaerobic cx  (Tissue) 2/16 Anaerobic GNR   Anaerobic cx  (Bone) 2/16 Anaerobes not isolated               Assessment:  · Consulted by Dr. Nelly Bailon to dose/monitor vancomycin  · Goal trough level:  15-20 mcg/mL  · Pt is a 64 y/o male who presented from home diabetic foot infection and cellulitis.   · Serum creatinine today: 0.5 mg/dL; CrCl ~ 103 mL/min; baseline Scr ~ 0.8 mg/dL  · 2/16: Trough = 14.7 mcg/mL  · 2/20: Repeat trough 14.8 mcg/mL    Plan:  · Vancomycin 1250 mg IV q12hr  · Follow renal function  · Pharmacist will follow and monitor/adjust dosing as necessary    Thank you for the consult,    Emily Malone PharmD, BCPS 2/22/2021 10:32 AM   Ext: [phone]

## 2021-02-22 NOTE — PROGRESS NOTES
Post-LYRIC Notes:        Pre-operative Diagnosis: Bacteremia, R/O Endocarditis    Post-operative Diagnosis: No LYRIC indication of endocarditis; Moderate Aortic regurgitation; Small PFO with right to left shunt    Procedure: LYRIC with agitated saline injection.     Anesthesia: LMAC    Surgeons/Assistants: Dr Shayy Cox    Estimated Blood Loss: None    Complications: None    Full report to follow    Electronically signed by Tobi Ni MD, [facility]

## 2021-02-22 NOTE — PROGRESS NOTES
2/22/21 Alexi Rawls is to be discharged tomorrow 2/23/21 back to [facility].  [facility]

## 2021-02-22 NOTE — PROGRESS NOTES
Comprehensive Nutrition Assessment    Type and Reason for Visit:  Reassess    Nutrition Recommendations/Plan: Recommend diet advancement to Carb Control 5, Will restart Ensure HP BID and Dallin BID w/ diet advancement. Nutrition Assessment:  Pt admitted w/ bilateral heel wounds, PMH of CVA and DM. Pt s/p L foot debridement w/ bone biopsy and wound VAC placement. Also s/p left SFA and popliteal angioplasty. Pt NPO this AM for possible LYRIC. Pt w/ fair intake since admission. Will restart ONS with diet advancement. Malnutrition Assessment:  Malnutrition Status:  Insufficient data    Context:  Acute Illness     Findings of the 6 clinical characteristics of malnutrition:  Energy Intake:  No significant decrease in energy intake(pt denied any recent change in intake)  Weight Loss:  Unable to assess(d/t edema noted w/ only actual recent EMR wt hx)     Body Fat Loss:  Unable to assess(pt out of room despite multiple attempts)     Muscle Mass Loss:  Unable to assess    Fluid Accumulation:  No significant fluid accumulation     Strength:  Not Performed    Estimated Daily Nutrient Needs:  Energy (kcal):  9428-7353(MSJ 1653 x 1.2); Weight Used for Energy Requirements:  Current     Protein (g):  105-120(1.3-1.5 gm/kg IBW); Weight Used for Protein Requirements:  Ideal        Fluid (ml/day):  6337-1762; Method Used for Fluid Requirements:  1 ml/kcal      Nutrition Related Findings:  Pt A/Ox4, abd WDL, +BS, +7.5L I/O, no edema, hyperglycemia, L sided hemiparesis      Wounds:  Multiple, Surgical Incision, Pressure Injury, Stage II       Current Nutrition Therapies:    Diet NPO, After Midnight    Anthropometric Measures:  · Height: 5' 11\" (180.3 cm)  · Current Body Weight: 182 lb (82.6 kg)(2/15 no method, UTO current wt d/t pt OOR)   · Admission Body Weight: 181 lb (82.1 kg)(2/14 no method)    · Usual Body Weight: 208 lb (94.3 kg)(12/26/20 bed scale, edema noted per EMR.  Limited actual EMR wt hx)     · Ideal Body Weight: 172 lbs; % Ideal Body Weight 105.8 %   · BMI: 25.4  · Adjusted Body Weight: No Adjustment   · BMI Categories: Overweight (BMI 25.0-29. 9)       Nutrition Diagnosis:   · Inadequate oral intake related to increase demand for energy/nutrients as evidenced by wounds, intake 51-75%    Nutrition Interventions:   Food and/or Nutrient Delivery:  Modify Current Diet, Start Oral Nutrition Supplement(Recommend diet advancement to Carb Control 5, Will restart Ensure HP BID and Dallin BID w/ diet advancement.)  Nutrition Education/Counseling:  No recommendation at this time   Coordination of Nutrition Care:  Continue to monitor while inpatient    Goals:  Nutrition Progression       Nutrition Monitoring and Evaluation:   Behavioral-Environmental Outcomes:  None Identified   Food/Nutrient Intake Outcomes:  Diet Advancement/Tolerance  Physical Signs/Symptoms Outcomes:  Biochemical Data, GI Status, Fluid Status or Edema, Nutrition Focused Physical Findings, Skin, Weight     Discharge Planning:    Continue Oral Nutrition Supplement     Electronically signed by Adeola Olmos RD, LD on 2/22/21 at 10:01 AM EST    Contact: 1987

## 2021-02-22 NOTE — PROGRESS NOTES
small vessel disease. 4. Correlation with imaging such as doppler ultrasound or CT angiography   could be considered. XR FOOT LEFT (2 VIEWS)   Final Result   No acute osseous abnormality. No definitive evidence for osteomyelitis. The osseous structures are osteopenic. XR CHEST PORTABLE   Final Result   No pneumonia or pleural effusion. Assessment/Plan:  Principal Problem:    Staphylococcus aureus bacteremia  Active Problems:    Diabetic foot infection ([facility]    Hypertension    Hemiparesis affecting left side as late effect of cerebrovascular accident [facility])    UTI (urinary tract infection)    Peripheral arterial disease ([facility]  Resolved Problems:    * No resolved hospital problems. *      1. Staph aureus bacteremia   -Currently on vancomycin and cefepime. Infectious disease following  -He also has Enterococcus faecalis growing from surgical wounds sensitive to current antibiotic coverage.  -Bone biopsy negative for osteomyelitis  -LYRIC done today 2/22 negative for endocarditis    2. Diabetic foot infection/bilateral lower extremity cellulitis   -continue vancomycin and cefepime  -Post wound debridement and bone biopsy left foot  2/16  -Surgical cultures show Enterococcus faecalis, Proteus mirabilis, staph aureus, and staph anginosus     3. Peripheral artery disease status post left SFA and popliteal angioplasty   -Continue aspirin, statin, and clopidogrel per vascular surgery    4. Type II DM with hyperglycemia  -On basal insulin, prandial insulin, and corrective scale with reasonable blood glucose control. Adjust as needed    5. Proteus mirabilis UTI  -Urine culture growing greater than 100,000 CFU/mL of Proteus mirabilis, sensitive to current antibiotic coverage    6. Hx of stroke  -Continue aspirin and statin    7. Disposition  - back to SNF tomorrow. DVT prophylaxis: Subcutaneous enoxaparin  Full code      NOTE: This report was transcribed using voice recognition software.  Every effort was made to ensure accuracy; however, inadvertent computerized transcription errors may be present.      Electronically signed by Dedra Cano MD on 2/22/2021 at 5:45 PM

## 2021-02-22 NOTE — CARE COORDINATION
2-22-Cm note: (covid neg 2-19) I spoke to liaison at [facility], pt can return there when he is stable for discharge, pt's insurance is still pending but he can return prior to ins approval. Will need a signed KELLY.  Electronically signed by Neal Saldana RN on 2/22/2021 at 10:31 AM

## 2021-02-22 NOTE — DISCHARGE INSTR - COC
Wound 02/15/21 Heel Left (Active)   Dressing Status Clean;Dry; Intact 02/22/21 0800   Dressing/Treatment Pressure dressing;Roll gauze;Negative pressure wound therapy 02/22/21 0800   Offloading for Diabetic Foot Ulcers Felt or foam 02/20/21 0009   Wound Length (cm) 4.5 cm 02/15/21 0004   Wound Width (cm) 4 cm 02/15/21 0004   Wound Depth (cm) 1 cm 02/15/21 0004   Wound Surface Area (cm^2) 18 cm^2 02/15/21 0004   Wound Volume (cm^3) 18 cm^3 02/15/21 0004   Wound Assessment Dry;Eschar dry 02/15/21 0004   Drainage Amount Scant 02/15/21 0004   Drainage Description Serosanguinous 02/15/21 0004   Odor Malodorous/putrid 02/15/21 0004   Adrianne-wound Assessment Warm 02/22/21 0800   Number of days: 7       Wound 02/15/21 Heel Right (Active)   Wound Etiology Pressure Stage  3 02/17/21 1343   Dressing Status Clean;Dry; Intact 02/22/21 0800   Dressing/Treatment Foam 02/22/21 0800   Offloading for Diabetic Foot Ulcers Offloading boot 02/20/21 1941   Wound Length (cm) 1.3 cm 02/17/21 1343   Wound Width (cm) 1.2 cm 02/17/21 1343   Wound Depth (cm) 0.3 cm 02/17/21 1343   Wound Surface Area (cm^2) 1.56 cm^2 02/17/21 1343   Change in Wound Size % (l*w) 48 02/17/21 1343   Wound Volume (cm^3) 0.47 cm^3 02/17/21 1343   Wound Healing % 84 02/17/21 250 [address]; Other (Comment) 02/17/21 1343   Drainage Amount None 02/22/21 0800   Drainage Description Serosanguinous 02/18/21 2000   Odor None 02/17/21 1343   Number of days: 7        Elimination:  Continence:   · Bowel: Yes  · Bladder: Yes  Urinary Catheter: None   Colostomy/Ileostomy/Ileal Conduit: No       Date of Last BM: 2/22/21    Intake/Output Summary (Last 24 hours) at 2/22/2021 1031  Last data filed at 2/22/2021 1025  Gross per 24 hour   Intake 830 ml   Output 0 ml   Net 830 ml     I/O last 3 completed shifts: In: 80 [P.O.:780]  Out: 0     Safety Concerns:      At Risk for Falls    Impairments/Disabilities:      None    Nutrition Therapy:  Current Nutrition Therapy: - Oral Diet:  General    Routes of Feeding: Oral  Liquids: No Restrictions  Daily Fluid Restriction: no  Last Modified Barium Swallow with Video (Video Swallowing Test): not done    Treatments at the Time of Hospital Discharge:   Respiratory Treatments: ***  Oxygen Therapy:  is not on home oxygen therapy. Ventilator:    - No ventilator support    Rehab Therapies: Physical Therapy and Occupational Therapy  Weight Bearing Status/Restrictions: Non-weight bearing on left leg  Other Medical Equipment (for information only, NOT a DME order):  wheelchair  Other Treatments: ***    Patient's personal belongings (please select all that are sent with patient):  {[facility] DME Belongings:[id number]}    RN SIGNATURE:  Electronically signed by George Sinclair RN on 2/22/21 at 6:36 PM EST    CASE MANAGEMENT/SOCIAL WORK SECTION    Inpatient Status Date: 2-    Readmission Risk Assessment Score:  Readmission Risk              Risk of Unplanned Readmission:        26           Discharging to Facility/ Agency   · Name: [facility]   · Address: [facility]   · Phone:[phone]  · Fax:    Dialysis Facility (if applicable)   · Name:  · Address:  · Dialysis Schedule:  · Phone:  · Fax:    / signature: Electronically signed by Neal Saldana RN on 2/22/21 at 10:51 AM EST    PHYSICIAN SECTION    Prognosis: Good    Condition at Discharge: Stable    Rehab Potential (if transferring to Rehab): Good    Recommended Labs or Other Treatments After Discharge: As per ID orders to monitor antibiotic therapy. Physician Certification: I certify the above information and transfer of Solitario Buchanan  is necessary for the continuing treatment of the diagnosis listed and that he requires [facility] for less 30 days.      Update Admission H&P: Changes in H&P as follows - see discharge summary    PHYSICIAN SIGNATURE:  Electronically signed by Gokul Santos MD on 2/22/21 at 5:44 PM EST

## 2021-02-22 NOTE — PROGRESS NOTES
Physical Therapy Treatment Note    Room #:  0336/0336-02  Patient Name: Cat Smallwood  YOB: 1960  MRN: [id number]    Referring Provider: Adrienne Dale DPM   Date of Service: 2/22/2021  Evaluating Physical Therapist: Tracy Leavitt, PT #[id number]       Diagnosis: Diabetic foot infection ([address] [E11.628, L08.9] bilateral heel wounds     Patient Active Problem List   Diagnosis    CVA (cerebral vascular accident) ([address]    Diabetes mellitus ([address]    Hyponatremia    Diabetic foot infection ([address]    Hypertension    Hemiparesis affecting left side as late effect of cerebrovascular accident ([address]    UTI (urinary tract infection)    Staphylococcus aureus bacteremia    Peripheral arterial disease ([address]      Tentative placement recommendation: Subacute rehab    Equipment recommendation:  To be determined      Prior Level of Function: Patient ambulated independently and with hemicane at therapy otherwise in wheelchair    Rehab Potential: fair for baseline    Past medical history:   Past Medical History:   Diagnosis Date    Cerebral artery occlusion with cerebral infarction ([address]     Diabetes mellitus ([address]     Type 2    Hemiparesis affecting left side as late effect of cerebrovascular accident ([address]     LEFT SIDE NON DOMINANT FOLLOWING STROKE    Hypertension      Past Surgical History:   Procedure Laterality Date    FINGER AMPUTATION      FOOT DEBRIDEMENT Left 2/16/2021    LEFT FOOT DEBRIDEMENT WITH BONE BIOPSY, WOUND VAC APPLICATION performed by Adrienne Dale DPM at [place] [address] ARTHROSCOPY      TONSILLECTOMY      UPPER GASTROINTESTINAL ENDOSCOPY N/A 1/4/2021    EGD BIOPSY performed by Saeid Esqueda DO at [facility] ENDOSCOPY     Precautions: Bedrest with bathroom Privileges , falls and alarm, history of cva with redisual left upper extremity hemiparesis, non weight bearing left lower extremity, wound vac  S/P LEFT FOOT DEBRIDEMENT WITH BONE BIOPSY, WOUND VAC APPLICATION    SUBJECTIVE: Social history: Patient lives in a skilled nursing facility, reports has been there ~ 3 weeks  Prior lives with mother wheelchair  Bound has nurse comes in to help and brother 59 yo has MS bedbound in basement has caregiver  in a ranch home  with 3 steps  to enter to kitchen with [address] 3 steps to bed/bath, to basement 12 steps with rail    Equipment owned: Wheelchair, Cane, Wheeled Walker, Bedside commode and Shower chair,       [facility] Mobility Inpatient   How much difficulty turning over in bed?: A Lot  How much difficulty sitting down on / standing up from a chair with arms?: A Lot  How much difficulty moving from lying on back to sitting on side of bed?: A Lot  How much help from another person moving to and from a bed to a chair?: Total  How much help from another person needed to walk in hospital room?: Total  How much help from another person for climbing 3-5 steps with a railing?: Total  AM-PAC Inpatient Mobility Raw Score : 9  AM-[facility] Inpatient T-Scale Score : 30.55  Mobility Inpatient CMS 0-100% Score: 81.38  Mobility Inpatient CMS G-Code Modifier : CM    Nursing cleared patient for PT treatment. OBJECTIVE;   Initial Evaluation  Date: 2/16/2021 Treatment Date:  2/22/2021   Short Term/ Long Term   Goals   Was pt agreeable to Eval/treatment? Yes   Yes To be met in 3 days   Pain level   4/10  Left foot No number assigned to left foot pain    Bed Mobility    Rolling: Supervision     Supine to sit: Minimal assist of 1    Sit to supine: Minimal assist of 1    Scooting: Minimal assist of 1   Rolling: Not assessed    Supine to sit: Not assessed    Sit to supine: Not assessed    Scooting: Not assessed   Rolling: Independent    Supine to sit:  Independent    Sit to supine: Independent    Scooting: Independent Transfers Sit to stand: attempted, patient unable to maintain non weight bearing  Left lower extremity  Will require assist of 2 Sit to stand: Not assessed  Sit to stand: Moderate assist of 1 non weight bearing Left lower extremity    Ambulation    not assessed   Not assessed    3 feet using  hemiwalker with Moderate assist of 1 non weight bearing Left lower extremity    Stair negotiation: ascended and descended   Not assessed    Not assessed        ROM Within functional limits except Left upper extremitiy due to history cva and Left lower extremity wound vac left foot limited active knee extension due weakness     Increase range of motion 10% of affected joints    Strength BUE:  refer to OT eval  RLE:  3+/5  LLE:  not assessed    Increase strength in affected mm groups by 1/3 grade   Balance Sitting EOB:  good -  Dynamic Standing:  not assessed   Sitting EOB: not assessed   Dynamic Standing: not assessed  Sitting EOB:  good    Dynamic Standing: fair hemiwalker     Patient education  Patient educated on role of Physical Therapy, risks of immobility, safety and plan of care,  importance of mobility while in hospital , weight bearing status  and positioning for skin integrity and comfort, non weight bearing left lower extremity     Patient response to education:   Pt verbalized understanding Pt demonstrated skill Pt requires further education in this area   Yes Partial Yes      Treatment:  Patient practiced and was instructed/facilitated in the following treatment: Patient performed below exercises in bed. Therapeutic Exercises: Patient performed AROM ankle pumps, heel slide, hip abduction/adduction and straight leg raise x  10 reps. Verbal cues were given for correct technique and to perform 2-3x daily. At end of session, patient in bed with call light and phone within reach, all lines and tubes intact, nursing notified. Patient would benefit from continued skilled Physical Therapy to improve functional independence and quality of life. Patient's/ family goals   home when able      ASSESSMENT: Patient exhibits decreased strength, balance, endurance and pain left foot impairing functional mobility, transfers, gait  and tolerance to activity non weight bearing Left lower extremity with wound vac     Plan of Care:   -Bed Mobility: Lower extremity exercises  and Upper extremity exercises   -Sitting Balance: Facilitate active trunk muscle engagement  and Facilitate postural control in all planes   -Standing Balance: Perform sit to stand activities maintaining non weightbearing left lower extremity  -Transfers: Provide instruction on proper hand and right lower extremity placement to maintain non weight bearing left lower extremity   -Gait: Gait training and Use of Assistive device for non weight bearing left lower extremity   -Endurance: Utilize Supervised activities to increase level of endurance to allow for safe functional mobility including transfers and gait     Patient and or family understand(s) diagnosis, prognosis, and plan of care. Frequency of treatments: Patient will be seen daily.        Time in: 9:01  Time out: 9:17    Total Treatment Time: 16  minutes    CPT codes:  Therapeutic exercises (37085)   16 minutes  1 unit(s)     Florinda Pradhan PTA   LIC# [id number]

## 2021-02-22 NOTE — ANESTHESIA POSTPROCEDURE EVALUATION
Department of Anesthesiology  Postprocedure Note    Patient: Gretel Kat  MRN: [id number]  YOB: 1960  Date of evaluation: 2/22/2021  Time:  4:03 PM     Procedure Summary     Date: 02/22/21 Room / Location: [address] [place] [address] / [facility]    Anesthesia Start: 1582 Anesthesia Stop: 1027    Procedure: TRANSESOPHAGEAL ECHOCARDIOGRAM WITH BUBBLE STUDY (N/A ) Diagnosis: (ENDOCARDITIS BACTEREMIA)    Surgeons: Rosa Perez MD Responsible Provider: Lydia Perez MD    Anesthesia Type: MAC ASA Status: 3          Anesthesia Type: MAC    Stephen Phase I: Stephen Score: 10    Stephen Phase II: Stephen Score: 10    Last vitals: Reviewed and per EMR flowsheets.        Anesthesia Post Evaluation    Patient location during evaluation: PACU  Patient participation: complete - patient participated  Level of consciousness: awake  Pain score: 3  Airway patency: patent  Nausea & Vomiting: no nausea  Complications: no  Cardiovascular status: blood pressure returned to baseline  Respiratory status: acceptable  Hydration status: euvolemic

## 2021-02-23 VITALS
WEIGHT: 182 LBS | OXYGEN SATURATION: 94 % | HEART RATE: 77 BPM | BODY MASS INDEX: 25.48 KG/M2 | DIASTOLIC BLOOD PRESSURE: 60 MMHG | SYSTOLIC BLOOD PRESSURE: 137 MMHG | RESPIRATION RATE: 16 BRPM | HEIGHT: 71 IN | TEMPERATURE: 97.8 F

## 2021-02-23 LAB
ANION GAP SERPL CALCULATED.3IONS-SCNC: 9 MMOL/L (ref 7–16)
BUN BLDV-MCNC: 12 MG/DL (ref 8–23)
CALCIUM SERPL-MCNC: 9.2 MG/DL (ref 8.6–10.2)
CHLORIDE BLD-SCNC: 98 MMOL/L (ref 98–107)
CO2: 24 MMOL/L (ref 22–29)
CREAT SERPL-MCNC: 0.6 MG/DL (ref 0.7–1.2)
GFR AFRICAN AMERICAN: >60
GFR NON-AFRICAN AMERICAN: >60 ML/MIN/1.73
GLUCOSE BLD-MCNC: 137 MG/DL (ref 74–99)
HCT VFR BLD CALC: 25.5 % (ref 37–54)
HEMOGLOBIN: 8 G/DL (ref 12.5–16.5)
MCH RBC QN AUTO: 25.6 PG (ref 26–35)
MCHC RBC AUTO-ENTMCNC: 31.4 % (ref 32–34.5)
MCV RBC AUTO: 81.5 FL (ref 80–99.9)
METER GLUCOSE: 129 MG/DL (ref 74–99)
PDW BLD-RTO: 15.1 FL (ref 11.5–15)
PLATELET # BLD: 293 E9/L (ref 130–450)
PMV BLD AUTO: 9.5 FL (ref 7–12)
POTASSIUM SERPL-SCNC: 4 MMOL/L (ref 3.5–5)
RBC # BLD: 3.13 E12/L (ref 3.8–5.8)
SODIUM BLD-SCNC: 131 MMOL/L (ref 132–146)
WBC # BLD: 9.8 E9/L (ref 4.5–11.5)

## 2021-02-23 PROCEDURE — 2580000003 HC RX 258: Performed by: THORACIC SURGERY (CARDIOTHORACIC VASCULAR SURGERY)

## 2021-02-23 PROCEDURE — 80048 BASIC METABOLIC PNL TOTAL CA: CPT

## 2021-02-23 PROCEDURE — 6370000000 HC RX 637 (ALT 250 FOR IP): Performed by: THORACIC SURGERY (CARDIOTHORACIC VASCULAR SURGERY)

## 2021-02-23 PROCEDURE — 6360000002 HC RX W HCPCS: Performed by: THORACIC SURGERY (CARDIOTHORACIC VASCULAR SURGERY)

## 2021-02-23 PROCEDURE — 36415 COLL VENOUS BLD VENIPUNCTURE: CPT

## 2021-02-23 PROCEDURE — 82962 GLUCOSE BLOOD TEST: CPT

## 2021-02-23 PROCEDURE — 85027 COMPLETE CBC AUTOMATED: CPT

## 2021-02-23 PROCEDURE — 36592 COLLECT BLOOD FROM PICC: CPT

## 2021-02-23 PROCEDURE — 99239 HOSP IP/OBS DSCHRG MGMT >30: CPT | Performed by: INTERNAL MEDICINE

## 2021-02-23 RX ADMIN — VANCOMYCIN HYDROCHLORIDE 1250 MG: 10 INJECTION, POWDER, LYOPHILIZED, FOR SOLUTION INTRAVENOUS at 01:31

## 2021-02-23 RX ADMIN — OXYCODONE 5 MG: 5 TABLET ORAL at 01:34

## 2021-02-23 RX ADMIN — CLOPIDOGREL BISULFATE 75 MG: 75 TABLET ORAL at 08:16

## 2021-02-23 RX ADMIN — HYDROCHLOROTHIAZIDE 25 MG: 25 TABLET ORAL at 08:16

## 2021-02-23 RX ADMIN — PANTOPRAZOLE SODIUM 40 MG: 40 TABLET, DELAYED RELEASE ORAL at 05:52

## 2021-02-23 RX ADMIN — ASPIRIN 81 MG: 81 TABLET, COATED ORAL at 08:16

## 2021-02-23 RX ADMIN — OXYCODONE 5 MG: 5 TABLET ORAL at 08:19

## 2021-02-23 RX ADMIN — ENOXAPARIN SODIUM 40 MG: 40 INJECTION SUBCUTANEOUS at 08:17

## 2021-02-23 RX ADMIN — CEFEPIME 2000 MG: 2 INJECTION, POWDER, FOR SOLUTION INTRAMUSCULAR; INTRAVENOUS at 05:52

## 2021-02-23 ASSESSMENT — PAIN SCALES - GENERAL: PAINLEVEL_OUTOF10: 8

## 2021-02-23 NOTE — PLAN OF CARE
Problem: Pain:  Goal: Pain level will decrease  Description: Pain level will decrease  2/23/2021 0932 by Lauren Antonio RN  Outcome: Completed  2/22/2021 2029 by Sarah Pak RN  Outcome: Met This Shift  Goal: Control of acute pain  Description: Control of acute pain  2/23/2021 0932 by Lauren Antonio RN  Outcome: Completed  2/22/2021 2029 by Sarah Pak RN  Outcome: Met This Shift  Goal: Control of chronic pain  Description: Control of chronic pain  2/23/2021 0932 by Lauren Antonio RN  Outcome: Completed  2/22/2021 2029 by Sarah Pak RN  Outcome: Met This Shift     Problem: Falls - Risk of:  Goal: Will remain free from falls  Description: Will remain free from falls  2/23/2021 0932 by Lauren Antonio RN  Outcome: Completed  2/22/2021 2029 by Sarah Pak RN  Outcome: Met This Shift  Goal: Absence of physical injury  Description: Absence of physical injury  2/23/2021 0932 by Lauren Antonio RN  Outcome: Completed  2/22/2021 2029 by Sarah Pak RN  Outcome: Met This Shift     Problem: Skin Integrity:  Goal: Will show no infection signs and symptoms  Description: Will show no infection signs and symptoms  2/23/2021 0932 by Lauren Antonio RN  Outcome: Completed  2/22/2021 2029 by Sarah Pak RN  Outcome: Met This Shift  Goal: Absence of new skin breakdown  Description: Absence of new skin breakdown  2/23/2021 0932 by Lauren Antonio RN  Outcome: Completed  2/22/2021 2029 by Sarah Pak RN  Outcome: Met This Shift     Problem: Serum Glucose Level - Abnormal:  Goal: Ability to maintain appropriate glucose levels will improve  Description: Ability to maintain appropriate glucose levels will improve  2/23/2021 0932 by Lauren Antonio RN  Outcome: Completed  2/22/2021 2029 by Sarah Pak RN  Outcome: Met This Shift

## 2021-02-23 NOTE — CARE COORDINATION
SS Note: Covid negative 2/19/21. Charge nurse arranged for [facility] [address], Fax [phone], for a skilled level of care. SW called and left voicemail for both pt's brother Darrick Dias and pt's mother regarding pt's return to SNF and time.   Electronically signed by ANGELO Rojo on 2/23/2021 at 9:04 AM

## 2021-02-23 NOTE — DISCHARGE SUMMARY
[facility] Physician Discharge Summary       Rober Le, [address] [facility] [address],[address] [phone]    In 1 week      Patricia Drew, [address]  [address] [address]  [phone]    In 2 weeks        Activity level: as per podiatry     Diet: DIET CARB CONTROL; Dietary Nutrition Supplements: Wound Healing Oral Supplement, Low Calorie High Protein Supplement    Labs:as per ID ordered to monitor antibiotic therapy    Condition at discharge: Stable     Dispo:Home    Patient ID:  Nicki Cordova  [id number]  63 y.o.  1960    Admit date: 2/14/2021    Discharge date and time:  2/23/2021  10:17 AM    Admission Diagnoses: Principal Problem:    Staphylococcus aureus bacteremia  Active Problems:    Diabetic foot infection ([facility]    Hypertension    Hemiparesis affecting left side as late effect of cerebrovascular accident [facility])    UTI (urinary tract infection)    Peripheral arterial disease ([facility]  Resolved Problems:    * No resolved hospital problems. *      Discharge Diagnoses: Principal Problem:    Staphylococcus aureus bacteremia  Active Problems:    Diabetic foot infection ([facility]    Hypertension    Hemiparesis affecting left side as late effect of cerebrovascular accident [facility])    UTI (urinary tract infection)    Peripheral arterial disease ([facility]  Resolved Problems:    * No resolved hospital problems. *      Consults:  IP CONSULT TO PHARMACY  IP CONSULT TO PODIATRY  IP CONSULT TO INFECTIOUS DISEASES  IP CONSULT TO VASCULAR SURGERY  IP CONSULT TO CARDIOLOGY    Procedures: Excision and debridement of left heel ulceration, I&D left foot, bone biopsy left foot and application of wound VAC 2/16; Double lumen PICC 2/18;  Angiogram with angioplasty of left SFA and popliteal artery 2/19    HPI:   This is a 63 y.o. male  has a past medical history of Cerebral artery occlusion with cerebral infarction ([facility] Diabetes mellitus ([facility] Hemiparesis affecting left side as late effect of cerebrovascular accident [facility]), and Hypertension. presented with fever, and heel pain and wound for last few days prior to arrival to the hospital.  For last 3 weeks he has had bilateral heel wounds that have been dressed and seen by infectious disease at the nursing home. Not on any antibiotics. Has pain of his heels but states that is not any worse than it was before.  No recent trauma or falls.  He does have a previous history of CVA with residual left-sided weakness. The patient was seen and examined at bedside, appears alert and awake with no acute distress and is able to answer simple  questions. On direct questioning, patient denied any  resting ongoing chest pain, resting SOB, hemoptysis, productive cough, fever, ongoing palpitation, active abdominal pain, hematemesis, rectal bleeding, wally, hematuria, any other  and GI complaints and any new focal neuro deficits.     Hospital Course: 63 male admitted as per above details. See outline below for additional details and issues addressed this admission:      1.  Staph aureus bacteremia   -Currently on vancomycin and cefepime. Infectious disease following  -He also has Enterococcus faecalis growing from surgical wounds sensitive to current antibiotic coverage.  -Bone biopsy negative for osteomyelitis  -LYRIC done 2/22 negative for endocarditis     2. Diabetic foot infection/bilateral lower extremity cellulitis   -continue vancomycin and cefepime  -Post wound debridement and bone biopsy left foot  2/16  -Surgical cultures show Enterococcus faecalis, Proteus mirabilis, staph aureus, and staph anginosus      3.  Peripheral artery disease status post left SFA and popliteal angioplasty   -Continue aspirin, statin, and clopidogrel per vascular surgery     4. Type II DM with hyperglycemia  -On basal insulin, prandial insulin, and corrective scale with reasonable blood glucose control. Adjust as needed     5.   Proteus mirabilis UTI 7:38 pm COMPARISON: None. HISTORY: ORDERING SYSTEM PROVIDED HISTORY: infected wound; r/o osteo TECHNOLOGIST PROVIDED HISTORY: Reason for exam:->infected wound; r/o osteo FINDINGS: There is no evidence of acute fracture. Possible remote fracture involving the 5th metatarsal neck. There is normal alignment of the tarsometatarsal joints. No acute joint abnormality. No focal osseous lesion. The osseous structures are osteopenic. No focal soft tissue abnormality. No acute osseous abnormality. No definitive evidence for osteomyelitis. The osseous structures are osteopenic. Xr Chest Portable    Result Date: 2/14/2021  EXAMINATION: ONE XRAY VIEW OF THE CHEST 2/14/2021 8:07 pm COMPARISON: December 30, 2020 HISTORY: ORDERING SYSTEM PROVIDED HISTORY: sepsis TECHNOLOGIST PROVIDED HISTORY: Reason for exam:->sepsis FINDINGS: No airspace opacity or pleural effusion. The heart is normal size. No pneumothorax. No free air beneath the hemidiaphragms. Multiple chronic left-sided rib fractures. No pneumonia or pleural effusion. Vl Lower Extremity Arterial Segmental Pressures W Ppg Bilateral    Result Date: 2/15/2021  EXAMINATION: ARTERIAL DUPLEX ULTRASOUND OF THE BILATERAL LOWER EXTREMITIES WITH SEGMENTAL PRESSURES AND PULSE VOLUME RECORDINGS 2/15/2021 2:31 pm TECHNIQUE: Arterial duplex MAHOGANY ultrasound. Segmental pressures and PVR performed with Doppler. COMPARISON: None. HISTORY: ORDERING SYSTEM PROVIDED HISTORY: PAD, left heel ulceration TECHNOLOGIST PROVIDED HISTORY: Reason for exam:->PAD, left heel ulceration What reading provider will be dictating this exam?->CRC FINDINGS: The MAHOGANY on the right is 1.01. The MAHOGANY on the left is 0.93. Pressures in the right upper and lower thigh could not be measured due to heart noncompressible vessels. Pressure in the left upper thigh is 167 mmHg with decreased to 97 mmHg in the lower thigh. This could be associated with significant stenosis.   Left calf pressure could not be measured due to heart noncompressible vessels. Right calf pressure is 150 mmHg. Indices for the right lower extremity: Right toe brachial index is 0.29 Indices for the left lower extremity:  Left toe brachial index is 0.28 Doppler waveforms are triphasic at the bilateral common femoral and superficial femoral level and right popliteal level. Biphasic waveforms are seen at the left popliteal and bilateral posterior tibial and dorsalis pedis. Pulse volume recordings are normal at the upper thigh and lower thigh level bilaterally, moderately blunted at the calf and ankle level and more severely blunted at the metatarsal level, greater on the left and at bilateral digital level. The digital waveforms show some artifact but are otherwise grossly unremarkable. 1. Significant pressure gradient at the left thigh level that could be associated with significant stenosis. 2. Normal right ankle brachial index at 1.01. The left MAHOGANY is mildly decreased at 0.93. Bilateral decreased toe brachial indices is 0.29 on the right and 0.28 on the left. 3. Abnormal PVRs at the metatarsal level, greater on the left and digital level. Findings could be related to significant distal small vessel disease. 4. Correlation with imaging such as doppler ultrasound or CT angiography could be considered. Patient Instructions:   Discharge Medication List as of 2/23/2021  8:36 AM      START taking these medications    Details   oxyCODONE (ROXICODONE) 5 MG immediate release tablet Take 1 tablet by mouth every 4 hours as needed for Pain for up to 5 days. , Disp-30 tablet, R-0Print      enoxaparin (LOVENOX) 40 MG/0.4ML injection Inject 0.4 mLs into the skin daily, R-0DC to SNF      glucose (GLUTOSE) 40 % GEL Take 37.5 mLs by mouth as needed (hypoglycemia), Disp-45 g, R-1DC to SNF      glucagon, rDNA, 1 MG injection Inject 1 mg into the muscle as needed for Low blood sugar (Blood glucose less than 70 mg/dL and patient NOT ALERT or NPO and found. Please discuss with provider. Note that more than 30 minutes was spent in preparing discharge papers, discussing discharge with patient, medication review, etc.    NOTE: This report was transcribed using voice recognition software. Every effort was made to ensure accuracy; however, inadvertent computerized transcription errors may be present.      Signed:  Electronically signed by Richie Chi MD on 2/23/2021 at 10:17 AM

## 2021-03-08 ENCOUNTER — HOSPITAL ENCOUNTER (OUTPATIENT)
Dept: WOUND CARE | Age: 61
Discharge: HOME | End: 2021-03-08
Payer: MEDICARE

## 2021-03-08 VITALS
BODY MASS INDEX: 25.48 KG/M2 | DIASTOLIC BLOOD PRESSURE: 76 MMHG | HEART RATE: 93 BPM | SYSTOLIC BLOOD PRESSURE: 130 MMHG | RESPIRATION RATE: 18 BRPM | WEIGHT: 182 LBS | TEMPERATURE: 97.8 F | HEIGHT: 71 IN

## 2021-03-08 PROCEDURE — 87070 CULTURE OTHR SPECIMN AEROBIC: CPT

## 2021-03-08 PROCEDURE — 99213 OFFICE O/P EST LOW 20 MIN: CPT

## 2021-03-08 PROCEDURE — 87075 CULTR BACTERIA EXCEPT BLOOD: CPT

## 2021-03-08 PROCEDURE — 11042 DBRDMT SUBQ TIS 1ST 20SQCM/<: CPT

## 2021-03-08 RX ORDER — BISACODYL 10 MG
10 SUPPOSITORY, RECTAL RECTAL DAILY
COMMUNITY
End: 2022-02-14 | Stop reason: ALTCHOICE

## 2021-03-08 RX ORDER — CHOLECALCIFEROL (VITAMIN D3) 1250 MCG
CAPSULE ORAL
COMMUNITY
End: 2021-11-22

## 2021-03-08 RX ORDER — HEPARIN SODIUM,PORCINE/PF 1 UNIT/ML
1 SYRINGE (ML) INTRAVENOUS PRN
COMMUNITY
End: 2021-06-07

## 2021-03-08 RX ORDER — FERROUS SULFATE 325(65) MG
325 TABLET ORAL
COMMUNITY

## 2021-03-08 RX ORDER — ASCORBIC ACID 500 MG
500 TABLET ORAL DAILY
COMMUNITY
End: 2021-11-22

## 2021-03-08 RX ORDER — ZINC GLUCONATE 50 MG
50 TABLET ORAL DAILY
COMMUNITY
End: 2021-11-22

## 2021-03-08 RX ORDER — ACETAMINOPHEN 325 MG/1
650 TABLET ORAL EVERY 6 HOURS PRN
COMMUNITY

## 2021-03-08 ASSESSMENT — PAIN DESCRIPTION - LOCATION: LOCATION: FOOT

## 2021-03-08 ASSESSMENT — PAIN DESCRIPTION - ORIENTATION: ORIENTATION: RIGHT;LEFT

## 2021-03-08 ASSESSMENT — PAIN DESCRIPTION - ONSET: ONSET: ON-GOING

## 2021-03-08 NOTE — PROGRESS NOTES
Wound Healing Center  History and Physical/Consultation  Podiatry    Referring Physician : Celia Alejandra MD  [address] [facility] RECORD NUMBER:  [id number]  AGE: 63 y.o. GENDER: male  : 1960  EPISODE DATE:  3/8/2021  Subjective:     Chief Complaint   Patient presents with    Wound Check     left and rt heel         HISTORY of PRESENT ILLNESS CHIQUITA Wilson is a 63 y.o. male who presents today for wound/ulcer evaluation. History of Wound Context:  The patient has had a wound of bilateral heels which was first noted approximately over a month ago. This has been treated with surgical debridement. On their initial visit to the wound healing center, 21 ,  the patient has noted that the wound has not been improving. The patient has had similar previous wounds in the past.      Pt is not on abx at time of initial visit.       Wound/Ulcer Pain Timing/Severity: none  Quality of pain: N/A  Severity:  0 / 10   Modifying Factors: None  Associated Signs/Symptoms: none    Ulcer Identification:  Ulcer Type: arterial and diabetic  Contributing Factors: diabetes    Diabetic/Pressure/Non Pressure Ulcers onl y:  Ulcer: Diabetic ulcer, fat layer exposed    If patient has diabetic lower extremity wounds  Kong Classification of diabetic lower extremity wounds:    Grade Description   []  0 No open wound   []  1 Superficial ulcer involving the full skin thickness   []  2 Deep ulcer involves ligament, tendon, joint capsule, or fascia  No bone involvement or abscess presence   []  3 Deep Ulcer with abcess formation and/or osteomyelitis   []  4 Localized gangrene   []  5 Extensive gangrene of the foot     Wound: N/A        PAST MEDICAL HISTORY      Diagnosis Date    [facility]erebral artery occlusion [facility] infarction [facility]     Diabetes mellitus ([facility]     Type 2    Hemiparesis affecting left side as late effect of cerebrovascular accident ([address]     LEFT SIDE NON DOMINANT FOLLOWING STROKE    Hypertension      Past Surgical History:   Procedure Laterality Date    FINGER AMPUTATION      FOOT DEBRIDEMENT Left 2/16/2021    LEFT FOOT DEBRIDEMENT WITH BONE BIOPSY, WOUND VAC APPLICATION performed by Agatha Grimes DPM at [place] [address] ARTHROSCOPY      TONSILLECTOMY      TRANSESOPHAGEAL ECHOCARDIOGRAM N/A 2/22/2021    TRANSESOPHAGEAL ECHOCARDIOGRAM WITH BUBBLE STUDY performed by Carola Drummond MD at [address] N/A 1/4/2021    EGD BIOPSY performed by Navid Ni DO at [place] [address]     No family history on file. Social History     Tobacco Use    Smoking status: Former Smoker    Smokeless tobacco: Never Used   Substance Use Topics    Alcohol use: Yes     Frequency: 4 or more times a week     Drinks per session: 5 or 6     Binge frequency: Daily or almost daily     Comment: every day drinker for most of adult life    Drug use: No     Allergies   Allergen Reactions    Pcn [Penicillins]      Current Outpatient Medications on File Prior to Encounter   Medication Sig Dispense Refill    acetaminophen (TYLENOL) 325 MG tablet Take 650 mg by mouth every 6 hours as needed for Pain      enoxaparin (LOVENOX) 40 MG/0.4ML injection Inject 0.4 mLs into the skin daily  0    glucose (GLUTOSE) 40 % GEL Take 37.5 mLs by mouth as needed (hypoglycemia) 45 g 1    glucagon, rDNA, 1 MG injection Inject 1 mg into the muscle as needed for Low blood sugar (Blood glucose less than 70 mg/dL and patient NOT ALERT or NPO and does not have IV access. ) 1 each 0    cefepime (MAXIPIME) infusion Infuse 1,000 mg intravenously every 12 hours Compound per protocol 84 g 0    vancomycin (VANCOCIN) infusion Infuse 1,250 mg intravenously every 12 hours Compound per protocol.  512272 mg 0    vitamin D (ERGOCALCIFEROL) 1.25 MG (03168 UT) CAPS capsule Take 1 capsule by mouth once a week 5 capsule 0    ipratropium-albuterol (DUONEB) 0.5-2.5 (3) MG/3ML SOLN nebulizer solution Inhale 3 mLs into the lungs every 4 hours 360 mL 0    pantoprazole (PROTONIX) 40 MG tablet Take 1 tablet by mouth every morning (before breakfast) 30 tablet 3    melatonin 3 MG TABS tablet Take 9 mg by mouth nightly as needed      aspirin 81 MG EC tablet Take 81 mg by mouth daily      hydroCHLOROthiazide (HYDRODIURIL) 25 MG tablet Take 25 mg by mouth daily      glimepiride (AMARYL) 2 MG tablet Take 1 tablet by mouth daily (with breakfast) 30 tablet 3    insulin lispro (HUMALOG) 100 UNIT/ML injection vial Inject 0-12 Units into the skin 3 times daily (with meals) 1 vial 3    insulin lispro (HUMALOG) 100 UNIT/ML injection vial Inject 0-6 Units into the skin nightly 1 vial 3    metFORMIN (GLUCOPHAGE) 500 MG tablet Take 1 tablet by mouth 2 times daily (with meals) 60 tablet 3    atorvastatin (LIPITOR) 40 MG tablet Take 1 tablet by mouth nightly 30 tablet 3    clopidogrel (PLAVIX) 75 MG tablet Take 1 tablet by mouth daily 30 tablet 3    vitamin B-1 100 MG tablet Take 1 tablet by mouth daily 30 tablet 3     No current facility-administered medications on file prior to encounter.         REVIEW OF SYSTEMS   ROS : All others Negative if blank [], Positive if [x]  General Vascular   [] Fevers [] Claudication   [] Chills [] Rest Pain   Skin Neurologic   [x] Tissue Loss [x] Lower extremity neuropathy     Objective:    /76   Pulse 93   Temp 97.8 °F (36.6 °C) (Oral)   Resp 18   Ht 5' 11\" (1.803 m)   Wt 182 lb (82.6 kg)   BMI 25.38 kg/m²   Wt Readings from Last 3 Encounters:   03/08/21 182 lb (82.6 kg)   02/15/21 182 lb (82.6 kg)   12/26/20 208 lb 1.6 oz (94.4 kg)       PHYSICAL EXAM   CONSTITUTIONAL:   Awake, alert, cooperative   PSYCHIATRIC :  Oriented to time, place and person      normal insight to disease process  EXTREMITIES:   R LE Open wounds are noted   Skin color is normal   Edema is not noted   Sensation deficit noted - to foot   Palpation of the foot does cause pain   5/5 strength DF/PF  L LE Open wounds are noted   Skin color is abnormal with hyperpigmentation   Edema is not noted   Sensation deficit noted - bilateral feet   Palpation of the foot does cause pain   5/5 strength DF/PF  R dorsalis pedis +2 L dorsalis pedis +2   R posterior tibial +2 L posterior tibial +2     Assessment:     Problem List Items Addressed This Visit     None          Pre Debridement Measurements:  Are located in the [facility]  Documentation Flow Sheet  Post Debridement Measurements:  Wound/Ulcer Descriptions are Pre Debridement except measurements:     Negative Pressure Wound Therapy Foot Left (Active)   $ Standard NPWT >50 sq cm PER TX $ Yes 02/22/21 1400   Dressing Type Black foam;Gauze 02/23/21 0800   Cycle Continuous 02/23/21 0800   Target Pressure (mmHg) 125 02/23/21 0800   Dressing Status Clean;Dry; Intact 02/23/21 0800   Drainage Amount Scant 02/23/21 0800   Drainage Description Sanguinous 02/23/21 0800   Output (ml) 0 ml 02/21/21 1730   Wound Assessment Other (Comment) 02/23/21 0800   Adrianne-wound Assessment Other (Comment) 02/19/21 0916   Number of days:        Wound 12/26/20 Arm Left (Active)   Number of days: 71       Wound 02/15/21 Heel Left (Active)   Wound Image   02/22/21 1400   Dressing Status Clean;Dry; Intact 02/23/21 0800   Wound Cleansed Cleansed with saline 02/22/21 1400   Dressing/Treatment Moist to dry 02/23/21 1007   Offloading for Diabetic Foot Ulcers Felt or foam 02/20/21 0009   Wound Length (cm) 5 cm 02/22/21 1400   Wound Width (cm) 4 cm 02/22/21 1400   Wound Depth (cm) 0.5 cm 02/22/21 1400   Wound Surface Area (cm^2) 20 cm^2 02/22/21 1400   Change in Wound Size % (l*w) -11.11 02/22/21 1400   Wound Volume (cm^3) 10 cm^3 02/22/21 1400   Wound Healing % 44 02/22/21 1400   Wound Assessment Pink/red 02/22/21 1400   Drainage Amount Small 02/22/21 1400   Drainage Description Serosanguinous 02/22/21 1400   Odor None 02/22/21 1400   Adrianne-wound Assessment Warm 02/23/21 0800   Number of days: 21       Wound 02/15/21 Heel Right (Active)   Wound Etiology Pressure Stage  3 02/17/21 1343   Dressing Status Clean;Dry; Intact 02/23/21 0800   Wound Cleansed Betadine/povidone iodine;Cleansed with saline 02/22/21 1400   Dressing/Treatment Foam 02/23/21 0800   Offloading for Diabetic Foot Ulcers Offloading boot 02/22/21 1400   Wound Length (cm) 0.5 cm 02/22/21 1400   Wound Width (cm) 0.5 cm 02/22/21 1400   Wound Depth (cm) 0.3 cm 02/17/21 1343   Wound Surface Area (cm^2) 0.25 cm^2 02/22/21 1400   Change in Wound Size % (l*w) 91.67 02/22/21 1400   Wound Volume (cm^3) 0.47 cm^3 02/17/21 1343   Wound Healing % 84 02/17/21 250 [address]; Other (Comment) 02/17/21 1343   Drainage Amount None 02/23/21 0800   Drainage Description Serosanguinous 02/22/21 1400   Odor None 02/22/21 1400   Number of days: 21       Wound 03/08/21 Heel Left #1 left heel aquired 12/1/20 (Active)   Wound Image   03/08/21 0934   Wound Length (cm) 3.9 cm 03/08/21 0934   Wound Width (cm) 3.1 cm 03/08/21 0934   Wound Depth (cm) 0.7 cm 03/08/21 0934   Wound Surface Area (cm^2) 12.09 cm^2 03/08/21 0934   Wound Volume (cm^3) 8.46 cm^3 03/08/21 0934   Wound Assessment Pink/red 03/08/21 0934   Drainage Amount Moderate 03/08/21 0934   Drainage Description Serosanguinous 03/08/21 0934   Odor None 03/08/21 0934   Adrianne-wound Assessment Other (Comment) 03/08/21 0934   Number of days: 0       Wound 03/08/21 Heel Right #2 rt heel aquired 12/1/20 (Active)   Wound Image   03/08/21 0934   Wound Length (cm) 1.1 cm 03/08/21 0934   Wound Width (cm) 1 cm 03/08/21 0934   Wound Depth (cm) 0.1 cm 03/08/21 0934   Wound Surface Area (cm^2) 1.1 cm^2 03/08/21 0934   Wound Volume (cm^3) 0.11 cm^3 03/08/21 0934   Wound Assessment Pink/red 03/08/21 0934   Drainage Amount None 03/08/21 0934   Odor None 03/08/21 0934   Adrianne-wound Assessment Maceration 03/08/21 0934   Number of days: 0     Incision 02/19/21 Groin Right (Active)   Dressing Status Clean;Dry; Intact; New dressing applied 02/23/21 0800 Dressing/Treatment Tegaderm/transparent film dressing;Gauze dressing/dressing sponge 02/23/21 0800   Incision Assessment Other (Comment) 02/19/21 1317   Drainage Amount None 02/21/21 1032   Adrianne-incision Assessment Other (Comment) 02/19/21 1317   Number of days: 16       Procedure Note  Indications:  Based on my examination of this patient's wound(s)/ulcer(s) today, debridement is required to promote healing and evaluate the wound base. Performed by: Rodrigo Morin DPM    Consent obtained:  Yes    Time out taken:  Yes    Pain Control: Anesthetic  Anesthetic: 4% Lidocaine Liquid Topical     Debridement:Excisional Debridement    Using curette the wound(s)/ulcer(s) was/were sharply debrided down through and including the removal of subcutaneous tissue. Devitalized Tissue Debrided:  slough to stimulate bleeding to promote healing, post debridement good bleeding base and wound edges noted    Wound/Ulcer #: 2    Percent of Wound/Ulcer Debrided: 100%    Total Surface Area Debrided:  13 sq cm     Estimated Blood Loss:  None  Hemostasis Achieved:  by pressure    Procedural Pain:  2  / 10   Post Procedural Pain:  2 / 10     Response to treatment:  Well tolerated by patient. A culture was done. Plan:     Pt is a smoker   - Discussed relationship of smoking and negative affects on wound healing   - Emphasized importance of tobacco avoidace/cessation   - Script for nicotine patch given to patient   - Information regarding support groups for smoking cessation given    In my professional opinion and based off the information that is available at this time this patient has appropriate indication for HBO Therapy: Maybe    Treatment Note please see attached Discharge Instructions    Written patient dismissal instructions given to patient and signed by patient or POA.            Electronically signed by Rodrigo Morin DPM on 3/8/2021 at 9:55 AM

## 2021-03-10 LAB
ORGANISM: ABNORMAL
WOUND/ABSCESS: ABNORMAL

## 2021-03-13 LAB — ANAEROBIC CULTURE: NORMAL

## 2021-03-17 PROBLEM — N39.0 UTI (URINARY TRACT INFECTION): Status: RESOLVED | Noted: 2021-02-15 | Resolved: 2021-03-17

## 2021-03-18 ENCOUNTER — HOSPITAL ENCOUNTER (OUTPATIENT)
Dept: WOUND CARE | Age: 61
Discharge: HOME | End: 2021-03-18
Payer: MEDICARE

## 2021-03-18 VITALS
SYSTOLIC BLOOD PRESSURE: 104 MMHG | RESPIRATION RATE: 16 BRPM | DIASTOLIC BLOOD PRESSURE: 50 MMHG | HEART RATE: 60 BPM | TEMPERATURE: 97.5 F

## 2021-03-18 PROCEDURE — 99213 OFFICE O/P EST LOW 20 MIN: CPT

## 2021-03-18 ASSESSMENT — PAIN DESCRIPTION - ONSET: ONSET: GRADUAL

## 2021-03-18 ASSESSMENT — PAIN DESCRIPTION - ORIENTATION: ORIENTATION: LEFT

## 2021-03-18 ASSESSMENT — PAIN DESCRIPTION - FREQUENCY: FREQUENCY: CONTINUOUS

## 2021-03-18 ASSESSMENT — PAIN DESCRIPTION - LOCATION: LOCATION: FOOT

## 2021-03-18 ASSESSMENT — PAIN DESCRIPTION - PAIN TYPE: TYPE: CHRONIC PAIN

## 2021-03-18 ASSESSMENT — PAIN SCALES - GENERAL: PAINLEVEL_OUTOF10: 6

## 2021-03-18 ASSESSMENT — PAIN - FUNCTIONAL ASSESSMENT: PAIN_FUNCTIONAL_ASSESSMENT: ACTIVITIES ARE NOT PREVENTED

## 2021-03-18 NOTE — PROGRESS NOTES
Wound Healing Center Followup Visit Note  Referring Physician : Mildred Espinosa MD  [address] [facility] RECORD NUMBER:  [id number]  AGE: 63 y.o. GENDER: male  : 1960  EPISODE DATE:  3/18/2021  Subjective:     Chief Complaint   Patient presents with    Wound Check     lt foot       HISTORY of PRESENT ILLNESS HPI   Lena Mendez is a 63 y.o. male who presents today in regards to follow up evaluation and treatment of wound/ulcer. That patient's past medical, family and social hx were reviewed and changes were made if present. History of Wound Context:  Pt is known to ID s/p D/c from [facility] on  2021  Came in with 1. Left heel ulceration and necrosis of muscle. 2.  Peripheral arterial disease. 3. Acute osteomyelitis, left foot. 4. Cellulitis, left foot with abscess.  1. Excision and debridement of the left heel ulceration to and through  level of deep fascia and muscle. Total measurement is 4.5 cm x 6.0 cm x  0.5 cm in dimension. 2.  Incision and drainage, left foot abscess. 3.  Bone biopsy, left foot. 4.  Application of wound VAC negative pressure therapy. cx Enterococcus  Path Bone biopsy left heel: Medullary bone, negative for osteomyelitis.      Bilateral iliac angiogram, left femoral  angiography, nitroglycerin infusion left common femoral artery 1000 mcg  x2 boluses, right common femoral artery to left popliteal artery  catheterization, PTA of the left SFA and popliteal artery with 4 x 80  RapidCross balloon, 5 x 300 Saber balloon, 5 x 220 Powerflex balloon, 6  x 100 Powerflex balloon and 6 x 250 IN. PACT Admiral balloon, completion  left femoral angiography, ProGlide closure right common femoral artery  access site.     Wound/Ulcer Pain Timing/Severity: none  Most Recent   Organism   Date Value Ref Range Status   2021 Staphylococcus coagulase-negative (A)  Final     PAST MEDICAL HISTORY      Diagnosis Date    Cerebral artery occlusion with cerebral infarction mouth      zinc gluconate 50 MG tablet Take 50 mg by mouth daily      cefepime (MAXIPIME) infusion Infuse 1,000 mg intravenously every 12 hours Compound per protocol 84 g 0    vancomycin (VANCOCIN) infusion Infuse 1,250 mg intravenously every 12 hours Compound per protocol. (Patient taking differently: Infuse 1,000 mg intravenously every 12 hours Compound per protocol.) [id number] mg 0    ipratropium-albuterol (DUONEB) 0.5-2.5 (3) MG/3ML SOLN nebulizer solution Inhale 3 mLs into the lungs every 4 hours 360 mL 0    pantoprazole (PROTONIX) 40 MG tablet Take 1 tablet by mouth every morning (before breakfast) 30 tablet 3    melatonin 3 MG TABS tablet Take 9 mg by mouth nightly as needed      aspirin 81 MG EC tablet Take 81 mg by mouth daily      hydroCHLOROthiazide (HYDRODIURIL) 25 MG tablet Take 25 mg by mouth daily      glimepiride (AMARYL) 2 MG tablet Take 1 tablet by mouth daily (with breakfast) 30 tablet 3    insulin lispro (HUMALOG) 100 UNIT/ML injection vial Inject 0-12 Units into the skin 3 times daily (with meals) 1 vial 3    insulin lispro (HUMALOG) 100 UNIT/ML injection vial Inject 0-6 Units into the skin nightly 1 vial 3    metFORMIN (GLUCOPHAGE) 500 MG tablet Take 1 tablet by mouth 2 times daily (with meals) 60 tablet 3    atorvastatin (LIPITOR) 40 MG tablet Take 1 tablet by mouth nightly 30 tablet 3    clopidogrel (PLAVIX) 75 MG tablet Take 1 tablet by mouth daily 30 tablet 3    vitamin B-1 100 MG tablet Take 1 tablet by mouth daily 30 tablet 3    magnesium hydroxide (MILK OF MAGNESIA CONCENTRATE) 2400 MG/10ML SUSP Take 30 mLs by mouth daily as needed      glucose (GLUTOSE) 40 % GEL Take 37.5 mLs by mouth as needed (hypoglycemia) 45 g 1    glucagon, rDNA, 1 MG injection Inject 1 mg into the muscle as needed for Low blood sugar (Blood glucose less than 70 mg/dL and patient NOT ALERT or NPO and does not have IV access. ) 1 each 0    vitamin D (ERGOCALCIFEROL) 1.25 MG (15329 UT) CAPS capsule Take 1 capsule by mouth once a week 5 capsule 0     No current facility-administered medications on file prior to encounter. REVIEW OF SYSTEMS See HPI  Objective:    BP (!) 104/50   Pulse 60   Temp 97.5 °F (36.4 °C)   Resp 16   Wt Readings from Last 3 Encounters:   03/08/21 182 lb (82.6 kg)   02/15/21 182 lb (82.6 kg)   12/26/20 208 lb 1.6 oz (94.4 kg)     PHYSICAL EXAM  CONSTITUTIONAL:   Awake, alert, cooperative  In WC  HEENT: AT/NC  HEART: S1/S2  RS: CTAB  ABD: SOFT NT/ND  EXT: NO EDEMA  SKIN:  Bruises ue Open wound Present  Rue picc    Wound Care Documentation:  Wound 12/26/20 Arm Left (Active)   Number of days: 81       Wound 02/15/21 Heel Left (Active)   Wound Image   02/22/21 1400   Dressing Status Clean;Dry; Intact 02/23/21 0800   Wound Cleansed Cleansed with saline 02/22/21 1400   Dressing/Treatment Moist to dry 02/23/21 1007   Offloading for Diabetic Foot Ulcers Felt or foam 02/20/21 0009   Wound Length (cm) 5 cm 02/22/21 1400   Wound Width (cm) 4 cm 02/22/21 1400   Wound Depth (cm) 0.5 cm 02/22/21 1400   Wound Surface Area (cm^2) 20 cm^2 02/22/21 1400   Change in Wound Size % (l*w) -11.11 02/22/21 1400   Wound Volume (cm^3) 10 cm^3 02/22/21 1400   Wound Healing % 44 02/22/21 1400   Wound Assessment Pink/red 02/22/21 1400   Drainage Amount Small 02/22/21 1400   Drainage Description Serosanguinous 02/22/21 1400   Odor None 02/22/21 1400   Adrianne-wound Assessment Warm 02/23/21 0800   Number of days: 31       Wound 02/15/21 Heel Right (Active)   Wound Etiology Pressure Stage  3 02/17/21 1343   Dressing Status Clean;Dry; Intact 02/23/21 0800   Wound Cleansed Betadine/povidone iodine;Cleansed with saline 02/22/21 1400   Dressing/Treatment Foam 02/23/21 0800   Offloading for Diabetic Foot Ulcers Offloading boot 02/22/21 1400   Wound Length (cm) 0.5 cm 02/22/21 1400   Wound Width (cm) 0.5 cm 02/22/21 1400   Wound Depth (cm) 0.3 cm 02/17/21 1343   Wound Surface Area (cm^2) 0.25 cm^2 02/22/21 1400   Change in Wound Size % (l*w) 91.67 02/22/21 1400   Wound Volume (cm^3) 0.47 cm^3 02/17/21 1343   Wound Healing % 84 02/17/21 [address]; Other (Comment) 02/17/21 1343   Drainage Amount None 02/23/21 0800   Drainage Description Serosanguinous 02/22/21 1400   Odor None 02/22/21 1400   Number of days: 31       Wound 03/08/21 Heel Left #1 left heel aquired 12/1/20 (Active)   Wound Image   03/08/21 0934   Dressing Status New dressing applied 03/08/21 1022   Wound Cleansed Cleansed with saline 03/08/21 1022   Dressing/Treatment Alginate;ABD;Roll gauze 03/08/21 1022   Offloading for Diabetic Foot Ulcers Post op shoe 03/08/21 1022   Wound Length (cm) 3.7 cm 03/18/21 1307   Wound Width (cm) 2 cm 03/18/21 1307   Wound Depth (cm) 0.3 cm 03/18/21 1307   Wound Surface Area (cm^2) 7.4 cm^2 03/18/21 1307   Change in Wound Size % (l*w) 38.79 03/18/21 1307   Wound Volume (cm^3) 2.22 cm^3 03/18/21 1307   Wound Healing % 74 03/18/21 1307   Wound Assessment Pink/red 03/18/21 1307   Drainage Amount Moderate 03/18/21 1307   Drainage Description Serosanguinous 03/18/21 1307   Odor None 03/18/21 1307   Adrianne-wound Assessment Maceration 03/18/21 1307   Number of days: 10       Wound 03/08/21 Heel Right #2 rt heel aquired 12/1/20 (Active)   Wound Image   03/08/21 0934   Dressing Status New dressing applied 03/08/21 1022   Wound Cleansed Cleansed with saline 03/08/21 1022   Dressing/Treatment Alginate;Dry dressing;Roll gauze 03/08/21 1022   Offloading for Diabetic Foot Ulcers Post op shoe 03/08/21 1022   Wound Length (cm) 0.7 cm 03/18/21 1307   Wound Width (cm) 1 cm 03/18/21 1307   Wound Depth (cm) 0.2 cm 03/18/21 1307   Wound Surface Area (cm^2) 0.7 cm^2 03/18/21 1307   Change in Wound Size % (l*w) 36.36 03/18/21 1307   Wound Volume (cm^3) 0.14 cm^3 03/18/21 1307   Wound Healing % -27 03/18/21 1307   Wound Assessment Pink/red 03/18/21 1307   Drainage Amount Scant 03/18/21 1307   Drainage Description Yellow 03/18/21 1307   Odor None 03/18/21 1307   Adrianne-wound Assessment Maceration 03/18/21 1307   Number of days: 10     Assessment:   MSSA bacteremia   BILATERAL HEEL PRESSURE ULCERS   NECROTIC WOUND, OSTEOMYELITIS, Cellulitis left foot with palpable bone   S/p LEFT FOOT DEBRIDEMENT WITH BONE BIOPSY, WOUND VAC APPLICATION  Bone biopsy left heel: Medullary bone, negative for osteomyelitis. S/p angio    UTI  - proteus s/p rx with cefepime  H/o vitamin D deficiency on suppleemnts    2/14 wound cx Mixed Gram positive organisms   Proteus species   TTE Summary    Normal left ventricular size and systolic function.    Ejection fraction is visually estimated at 60-65%.    Indeterminate diastolic function.    No regional wall motion abnormalities seen.    Mild left ventricular concentric hypertrophy noted.    Normal right ventricular size and function.    The left atrium is mildly dilated.    Aortic valve leaflets are moderately calcified.    Aortic leaflets are thickened, likely due to degenerative changes, however    a small vegetation can not be excluded.    Mild aortic valve regurgitation.    No vegetations seen otherwise. Plan:     vancomycin (VANCOCIN) 1,000 mg  Q12H til 4/1/2021  add doxycyline 100mg po q12 as suppression  allergic to pcn      Orders Placed This Encounter   Procedures    Apply dressing     Plan:   Treatment Note please see attached Discharge Instructions    Written patient dismissal instructions given to patient and signed by patient or POA. Discharge Instructions         Discharge condition: Stable     Assessment of pain at discharge: moderate     Anesthetic used: 4% liquid lidocaine solution      Discharge to: ECF     Left via:ambulance     Accompanied by: self     ECF/[facility]: [facility]     Dressing Orders:  Cleanse bilateral heel ulcers with normal saline solution and apply collagen covered with alginate to right heel cover with dry dressing and change every day or as needed for break through drainage.   To left heel pack with black foam and protect surrounding skin with transparent drape and apply wound VAC at 125 mm hhg continuous suction, change three times weekly. Change canister weekly or as needed. If VAC malfunctions or dislodges, remove and apply saline moist gauze and dry dressing changing daily until [facility] can be reapplied. Saline moist gauze applied in clinic today.     Treatment Orders:  Continue antibiotics per Dr Cipriano Joyner. Wear prevalon boots or heel ayo while in bed. May use surgical shoe and be 50% WB with PT. Continue IV antibiotics until 4/1 visit with Dr Cipriano Joyner.     [address] [place] [address],[address] followup visit _______1 weeks Dr Harrell_______2 weeks Dr Toro_______________  (Please note your next appointment above and if you are unable to keep, kindly give a 24 hour notice.  Thank you.)     Physician signature:__________________________        If you experience any of the following, please call the [facility] during business hours:     * Increase in Pain  * Temperature over 101  * Increase in drainage from your wound  * Drainage with a foul odor  * Bleeding  * Increase in swelling  * Need for compression bandage changes due to slippage, breakthrough drainage.     If you need medical attention outside of the business hours of the [facility] please contact your PCP or go to the nearest emergency room.                 Electronically signed by Francois Andrews MD on 3/18/2021 at 1:27 PM

## 2021-03-18 NOTE — PLAN OF CARE
Problem: Pain:  Goal: Pain level will decrease  Description: Pain level will decrease  3/18/2021 1321 by Ankur Lai RN  Outcome: Met This Shift  3/18/2021 1321 by Ankur Lai RN  Outcome: Met This Shift  Goal: Control of acute pain  Description: Control of acute pain  3/18/2021 1321 by Ankur Lai RN  Outcome: Met This Shift  3/18/2021 1321 by Ankur Lai RN  Outcome: Met This Shift  Goal: Control of chronic pain  Description: Control of chronic pain  3/18/2021 1321 by Ankur Lai RN  Outcome: Met This Shift  3/18/2021 1321 by Ankur Lai RN  Outcome: Met This Shift

## 2021-03-22 ENCOUNTER — HOSPITAL ENCOUNTER (OUTPATIENT)
Dept: WOUND CARE | Age: 61
Discharge: HOME | End: 2021-03-22
Payer: MEDICARE

## 2021-03-22 VITALS
SYSTOLIC BLOOD PRESSURE: 112 MMHG | HEART RATE: 70 BPM | RESPIRATION RATE: 18 BRPM | DIASTOLIC BLOOD PRESSURE: 68 MMHG | TEMPERATURE: 97.2 F

## 2021-03-22 PROCEDURE — 11042 DBRDMT SUBQ TIS 1ST 20SQCM/<: CPT

## 2021-03-22 RX ORDER — LIDOCAINE HYDROCHLORIDE 40 MG/ML
SOLUTION TOPICAL ONCE
Status: DISCONTINUED | OUTPATIENT
Start: 2021-03-22 | End: 2021-03-23 | Stop reason: HOSPADM

## 2021-03-22 ASSESSMENT — PAIN SCALES - GENERAL: PAINLEVEL_OUTOF10: 3

## 2021-03-22 ASSESSMENT — PAIN DESCRIPTION - PAIN TYPE: TYPE: CHRONIC PAIN

## 2021-03-22 ASSESSMENT — PAIN DESCRIPTION - LOCATION: LOCATION: FOOT

## 2021-03-22 ASSESSMENT — PAIN DESCRIPTION - ONSET: ONSET: ON-GOING

## 2021-03-22 ASSESSMENT — PAIN DESCRIPTION - PROGRESSION: CLINICAL_PROGRESSION: GRADUALLY IMPROVING

## 2021-03-22 NOTE — PROGRESS NOTES
[facility]  History and Physical/Consultation  Podiatry    Referring Physician : Edvin Miller MD  [id number] [facility] RECORD NUMBER:  [id number]  AGE: 63 y.o. GENDER: male  : 1960  EPISODE DATE:  3/22/2021  Subjective:     Chief Complaint   Patient presents with    Wound Check     lt foot          HISTORY of PRESENT ILLNESS HPI     Tammy Koo is a 63 y.o. male who presents today for wound/ulcer evaluation. History of Wound Context:  The patient has had a wound of bilateral heels which was first noted approximately over a month ago. This has been treated with surgical debridement. On their initial visit to the wound healing center, 21 ,  the patient has noted that the wound has not been improving. The patient has had similar previous wounds in the past.      Pt is not on abx at time of initial visit. 3/22/21 - Wound VAC MWF continue to left heel. Aquacel Ag to right heel. Debrided toenails 1-5 in thickness and length. FU 1 week. IV Abx per Dr. Aylin Montes De Oca.       Wound/Ulcer Pain Timing/Severity: none  Quality of pain: N/A  Severity:  0 / 10   Modifying Factors: None  Associated Signs/Symptoms: none    Ulcer Identification:  Ulcer Type: arterial and diabetic  Contributing Factors: diabetes    Diabetic/Pressure/Non Pressure Ulcers onl y:  Ulcer: Diabetic ulcer, fat layer exposed    If patient has diabetic lower extremity wounds  Kong Classification of diabetic lower extremity wounds:    Grade Description   []  0 No open wound   []  1 Superficial ulcer involving the full skin thickness   []  2 Deep ulcer involves ligament, tendon, joint capsule, or fascia  No bone involvement or abscess presence   []  3 Deep Ulcer with abcess formation and/or osteomyelitis   []  4 Localized gangrene   []  5 Extensive gangrene of the foot     Wound: N/A        PAST MEDICAL HISTORY      Diagnosis Date    Cerebral artery occlusion with cerebral infarction [facility] [address]     Diabetes mellitus ([facility] [address]     Type 2    Hemiparesis affecting left side as late effect of cerebrovascular accident ([facility] [address]     LEFT SIDE NON DOMINANT FOLLOWING STROKE    Hypertension      Past Surgical History:   Procedure Laterality Date    FINGER AMPUTATION      FOOT DEBRIDEMENT Left 2/16/2021    LEFT FOOT DEBRIDEMENT WITH BONE BIOPSY, WOUND VAC APPLICATION performed by Edouard Leggett DPM at [facility] ARTHROSCOPY      TONSILLECTOMY      TRANSESOPHAGEAL ECHOCARDIOGRAM N/A 2/22/2021    TRANSESOPHAGEAL ECHOCARDIOGRAM WITH BUBBLE STUDY performed by Didier Tomas MD at [facility] N/A 1/4/2021    EGD BIOPSY performed by Lena Diaz DO at [place] [address] reviewed. No pertinent family history. Social History     Tobacco Use    Smoking status: Former Smoker    Smokeless tobacco: Never Used   Substance Use Topics    Alcohol use: Yes     Frequency: 4 or more times a week     Drinks per session: 5 or 6     Binge frequency: Daily or almost daily     Comment: every day drinker for most of adult life    Drug use: No     Allergies   Allergen Reactions    Pcn [Penicillins]      Current Outpatient Medications on File Prior to Encounter   Medication Sig Dispense Refill    acetaminophen (TYLENOL) 325 MG tablet Take 650 mg by mouth every 6 hours as needed for Pain      bisacodyl (DULCOLAX) 10 MG suppository Place 10 mg rectally daily      ferrous sulfate (IRON 325) 325 (65 Fe) MG tablet Take 325 mg by mouth daily (with breakfast)      Heparin Lock Flush (HEPARIN FLUSH, 1 UNITS/ML,) 1 UNIT/ML injection 1 Units as needed for Line Care      magnesium hydroxide (MILK OF MAGNESIA CONCENTRATE) 2400 MG/10ML SUSP Take 30 mLs by mouth daily as needed      Sodium Chloride Flush (SALINE FLUSH IV) Infuse intravenously 2 times daily Flush picc      collagenase 250 UNIT/GM ointment Apply topically daily Apply topically daily.       ascorbic acid (VITAMIN C) 500 MG tablet Take 500 mg by mouth daily      Cholecalciferol (VITAMIN D3) 1.25 MG (51231 UT) CAPS Take by mouth      zinc gluconate 50 MG tablet Take 50 mg by mouth daily      glucose (GLUTOSE) 40 % GEL Take 37.5 mLs by mouth as needed (hypoglycemia) 45 g 1    glucagon, rDNA, 1 MG injection Inject 1 mg into the muscle as needed for Low blood sugar (Blood glucose less than 70 mg/dL and patient NOT ALERT or NPO and does not have IV access. ) 1 each 0    cefepime (MAXIPIME) infusion Infuse 1,000 mg intravenously every 12 hours Compound per protocol 84 g 0    vancomycin (VANCOCIN) infusion Infuse 1,250 mg intravenously every 12 hours Compound per protocol.  (Patient taking differently: Infuse 1,000 mg intravenously every 12 hours Compound per protocol.) [id number] mg 0    vitamin D (ERGOCALCIFEROL) 1.25 MG (61961 UT) CAPS capsule Take 1 capsule by mouth once a week 5 capsule 0    ipratropium-albuterol (DUONEB) 0.5-2.5 (3) MG/3ML SOLN nebulizer solution Inhale 3 mLs into the lungs every 4 hours 360 mL 0    pantoprazole (PROTONIX) 40 MG tablet Take 1 tablet by mouth every morning (before breakfast) 30 tablet 3    melatonin 3 MG TABS tablet Take 9 mg by mouth nightly as needed      aspirin 81 MG EC tablet Take 81 mg by mouth daily      hydroCHLOROthiazide (HYDRODIURIL) 25 MG tablet Take 25 mg by mouth daily      glimepiride (AMARYL) 2 MG tablet Take 1 tablet by mouth daily (with breakfast) 30 tablet 3    insulin lispro (HUMALOG) 100 UNIT/ML injection vial Inject 0-12 Units into the skin 3 times daily (with meals) 1 vial 3    insulin lispro (HUMALOG) 100 UNIT/ML injection vial Inject 0-6 Units into the skin nightly 1 vial 3    metFORMIN (GLUCOPHAGE) 500 MG tablet Take 1 tablet by mouth 2 times daily (with meals) 60 tablet 3    atorvastatin (LIPITOR) 40 MG tablet Take 1 tablet by mouth nightly 30 tablet 3    clopidogrel (PLAVIX) 75 MG tablet Take 1 tablet by mouth daily 30 tablet 3    vitamin B-1 100 MG tablet Take 1 tablet by mouth daily 30 tablet 3     No current facility-administered medications on file prior to encounter. REVIEW OF SYSTEMS   ROS : All others Negative if blank [], Positive if [x]  General Vascular   [] Fevers [] Claudication   [] Chills [] Rest Pain   Skin Neurologic   [x] Tissue Loss [x] Lower extremity neuropathy     Objective:    /68   Pulse 70   Temp 97.2 °F (36.2 °C)   Resp 18   Wt Readings from Last 3 Encounters:   03/08/21 182 lb (82.6 kg)   02/15/21 182 lb (82.6 kg)   12/26/20 208 lb 1.6 oz (94.4 kg)       PHYSICAL EXAM   CONSTITUTIONAL:   Awake, alert, cooperative   PSYCHIATRIC :  Oriented to time, place and person      normal insight to disease process  EXTREMITIES:   R LE Open wounds are noted   Skin color is normal   Edema is not noted   Sensation deficit noted - to foot   Palpation of the foot does cause pain   5/5 strength DF/PF  L LE Open wounds are noted   Skin color is abnormal with hyperpigmentation   Edema is not noted   Sensation deficit noted - bilateral feet   Palpation of the foot does cause pain   5/5 strength DF/PF  R dorsalis pedis +2 L dorsalis pedis +2   R posterior tibial +2 L posterior tibial +2     Assessment:     Problem List Items Addressed This Visit     None          Pre Debridement Measurements:  Are located in the [place]  Documentation Flow Sheet  Post Debridement Measurements:  Wound/Ulcer Descriptions are Pre Debridement except measurements:     Wound 12/26/20 Arm Left (Active)   Number of days: 85       Wound 02/15/21 Heel Left (Active)   Wound Image   02/22/21 1400   Dressing Status Clean;Dry; Intact 02/23/21 0800   Wound Cleansed Cleansed with saline 02/22/21 1400   Dressing/Treatment Moist to dry 02/23/21 1007   Offloading for Diabetic Foot Ulcers Felt or foam 02/20/21 0009   Wound Length (cm) 5 cm 02/22/21 1400   Wound Width (cm) 4 cm 02/22/21 1400   Wound Depth (cm) 0.5 cm 02/22/21 1400   Wound Surface Area (cm^2) 20 cm^2 02/22/21 1400   Change in Wound Size % (l*w) -11.11 02/22/21 1400   Wound Volume (cm^3) 10 cm^3 02/22/21 1400   Wound Healing % 44 02/22/21 1400   Wound Assessment Pink/red 02/22/21 1400   Drainage Amount Small 02/22/21 1400   Drainage Description Serosanguinous 02/22/21 1400   Odor None 02/22/21 1400   Adrianne-wound Assessment Warm 02/23/21 0800   Number of days: 35       Wound 02/15/21 Heel Right (Active)   Wound Etiology Pressure Stage  3 02/17/21 1343   Dressing Status Clean;Dry; Intact 02/23/21 0800   Wound Cleansed Betadine/povidone iodine;Cleansed with saline 02/22/21 1400   Dressing/Treatment Foam 02/23/21 0800   Offloading for Diabetic Foot Ulcers Offloading boot 02/22/21 1400   Wound Length (cm) 0.5 cm 02/22/21 1400   Wound Width (cm) 0.5 cm 02/22/21 1400   Wound Depth (cm) 0.3 cm 02/17/21 1343   Wound Surface Area (cm^2) 0.25 cm^2 02/22/21 1400   Change in Wound Size % (l*w) 91.67 02/22/21 1400   Wound Volume (cm^3) 0.47 cm^3 02/17/21 1343   Wound Healing % 84 02/17/21 250 [address]; Other (Comment) 02/17/21 1343   Drainage Amount None 02/23/21 0800   Drainage Description Serosanguinous 02/22/21 1400   Odor None 02/22/21 1400   Number of days: 35       Wound 03/08/21 Heel Left #1 left heel aquired 12/1/20 (Active)   Wound Image   03/22/21 0911   Dressing Status New dressing applied 03/18/21 1352   Wound Cleansed Cleansed with saline 03/22/21 1007   Dressing/Treatment Moist to moist;Roll gauze 03/22/21 1007   Offloading for Diabetic Foot Ulcers Post op shoe 03/08/21 1022   Wound Length (cm) 3.9 cm 03/22/21 0911   Wound Width (cm) 2 cm 03/22/21 0911   Wound Depth (cm) 0.8 cm 03/22/21 0911   Wound Surface Area (cm^2) 7.8 cm^2 03/22/21 0911   Change in Wound Size % (l*w) 35.48 03/22/21 0911   Wound Volume (cm^3) 6.24 cm^3 03/22/21 0911   Wound Healing % 26 03/22/21 0911   Post-Procedure Length (cm) 3.9 cm 03/22/21 0954   Post-Procedure Width (cm) 2 cm 03/22/21 0954   Post-Procedure Depth (cm) 0.8 cm 03/22/21 0954   Post-Procedure Surface Area (cm^2) 7.8 cm^2 03/22/21 0954   Post-Procedure Volume (cm^3) 6.24 cm^3 03/22/21 0954   Wound Assessment Pink/red 03/22/21 0911   Drainage Amount Moderate 03/22/21 0911   Drainage Description Serosanguinous 03/22/21 0911   Odor None 03/22/21 0911   Adrianne-wound Assessment Dry/flaky; Intact; Other (Comment) 03/22/21 0911   Number of days: 13       Wound 03/08/21 Heel Right #2 rt heel aquired 12/1/20 (Active)   Wound Image   03/22/21 0911   Dressing Status New dressing applied 03/18/21 1352   Wound Cleansed Cleansed with saline 03/22/21 1007   Dressing/Treatment Alginate;Collagen;Silicone border 19/92/86 1007   Offloading for Diabetic Foot Ulcers Post op shoe 03/08/21 1022   Wound Length (cm) 0.7 cm 03/22/21 0911   Wound Width (cm) 0.4 cm 03/22/21 0911   Wound Depth (cm) 0.2 cm 03/22/21 0911   Wound Surface Area (cm^2) 0.28 cm^2 03/22/21 0911   Change in Wound Size % (l*w) 74.55 03/22/21 0911   Wound Volume (cm^3) 0.06 cm^3 03/22/21 0911   Wound Healing % 45 03/22/21 0911   Post-Procedure Length (cm) 0.7 cm 03/22/21 0954   Post-Procedure Width (cm) 0.4 cm 03/22/21 0954   Post-Procedure Depth (cm) 0.2 cm 03/22/21 0954   Post-Procedure Surface Area (cm^2) 0.28 cm^2 03/22/21 0954   Post-Procedure Volume (cm^3) 0.06 cm^3 03/22/21 0954   Wound Assessment Fibrin 03/22/21 0911   Drainage Amount Scant 03/22/21 0911   Drainage Description Yellow 03/22/21 0911   Odor None 03/22/21 0911   Adrianne-wound Assessment Maceration 03/22/21 0911   Number of days: 13     Incision 02/19/21 Groin Right (Active)   Dressing Status Clean;Dry; Intact; New dressing applied 02/23/21 0800   Dressing/Treatment Tegaderm/transparent film dressing;Gauze dressing/dressing sponge 02/23/21 0800   Incision Assessment Other (Comment) 02/19/21 1317   Drainage Amount None 02/21/21 1032   Adrianne-incision Assessment Other (Comment) 02/19/21 1317   Number of days: 30       Procedure Note  Indications:  Based on my examination of this patient's wound(s)/ulcer(s) today, debridement is required to promote healing and evaluate the wound base. Performed by: Mario Francis DPM    Consent obtained:  Yes    Time out taken:  Yes    Pain Control:       Debridement:Excisional Debridement    Using curette the wound(s)/ulcer(s) was/were sharply debrided down through and including the removal of subcutaneous tissue. Devitalized Tissue Debrided:  slough to stimulate bleeding to promote healing, post debridement good bleeding base and wound edges noted    Wound/Ulcer #: 2    Percent of Wound/Ulcer Debrided: 100%    Total Surface Area Debrided:  8 sq cm     Estimated Blood Loss:  None  Hemostasis Achieved:  by pressure    Procedural Pain:  2  / 10   Post Procedural Pain:  2 / 10     Response to treatment:  Well tolerated by patient. A culture was done. Plan:     Pt is a smoker   - Discussed relationship of smoking and negative affects on wound healing   - Emphasized importance of tobacco avoidace/cessation   - Script for nicotine patch given to patient   - Information regarding support groups for smoking cessation given    In my professional opinion and based off the information that is available at this time this patient has appropriate indication for HBO Therapy: Maybe    Treatment Note please see attached Discharge Instructions    Written patient dismissal instructions given to patient and signed by patient or POA.            Electronically signed by Mario Francis DPM on 3/22/2021 at 10:12 AM

## 2021-03-29 LAB
FUNGUS (MYCOLOGY) CULTURE: NORMAL
FUNGUS (MYCOLOGY) CULTURE: NORMAL
FUNGUS STAIN: NORMAL
FUNGUS STAIN: NORMAL

## 2021-04-01 ENCOUNTER — HOSPITAL ENCOUNTER (OUTPATIENT)
Dept: WOUND CARE | Age: 61
Discharge: HOME | End: 2021-04-01
Payer: MEDICARE

## 2021-04-01 VITALS
WEIGHT: 182 LBS | HEART RATE: 77 BPM | SYSTOLIC BLOOD PRESSURE: 118 MMHG | TEMPERATURE: 98.2 F | DIASTOLIC BLOOD PRESSURE: 60 MMHG | BODY MASS INDEX: 25.38 KG/M2 | RESPIRATION RATE: 20 BRPM

## 2021-04-01 DIAGNOSIS — E11.628 DIABETIC FOOT INFECTION (HCC): Primary | ICD-10-CM

## 2021-04-01 DIAGNOSIS — B95.61 STAPHYLOCOCCUS AUREUS BACTEREMIA: ICD-10-CM

## 2021-04-01 DIAGNOSIS — L08.9 DIABETIC FOOT INFECTION (HCC): Primary | ICD-10-CM

## 2021-04-01 DIAGNOSIS — R78.81 STAPHYLOCOCCUS AUREUS BACTEREMIA: ICD-10-CM

## 2021-04-01 PROCEDURE — 99213 OFFICE O/P EST LOW 20 MIN: CPT

## 2021-04-01 ASSESSMENT — PAIN DESCRIPTION - FREQUENCY: FREQUENCY: INTERMITTENT

## 2021-04-01 ASSESSMENT — PAIN DESCRIPTION - ORIENTATION: ORIENTATION: LEFT

## 2021-04-01 ASSESSMENT — PAIN DESCRIPTION - PAIN TYPE: TYPE: CHRONIC PAIN

## 2021-04-01 ASSESSMENT — PAIN DESCRIPTION - DESCRIPTORS: DESCRIPTORS: ACHING

## 2021-04-01 NOTE — PROGRESS NOTES
[facility] Followup Visit Note  Referring Physician : Renard Elaine MD  [address] [facility] RECORD NUMBER:  [id number]  AGE: 63 y.o. GENDER: male  : 1960  EPISODE DATE:  2021  Subjective:     Chief Complaint   Patient presents with    Wound Check     lt leg      HISTORY of PRESENT ILLNESS HPI   Matt Aparicio is a 63 y.o. male who presents today in regards to follow up evaluation and treatment of wound/ulcer. That patient's past medical, family and social hx were reviewed and changes were made if present. History of Wound Context:  Pt is known to ID s/p D/c from [facility] on  2021  Came in with 1. Left heel ulceration and necrosis of muscle. 2.  Peripheral arterial disease. 3. Acute osteomyelitis, left foot. 4. Cellulitis, left foot with abscess.  1. Excision and debridement of the left heel ulceration to and through  level of deep fascia and muscle. Total measurement is 4.5 cm x 6.0 cm x  0.5 cm in dimension. 2.  Incision and drainage, left foot abscess. 3.  Bone biopsy, left foot. 4.  Application of wound VAC negative pressure therapy. cx Enterococcus  Path Bone biopsy left heel: Medullary bone, negative for osteomyelitis.      Bilateral iliac angiogram, left femoral  angiography, nitroglycerin infusion left common femoral artery 1000 mcg  x2 boluses, right common femoral artery to left popliteal artery  catheterization, PTA of the left SFA and popliteal artery with 4 x 80  RapidCross balloon, 5 x 300 Saber balloon, 5 x 220 Powerflex balloon, 6  x 100 Powerflex balloon and 6 x 250 IN. PACT Admiral balloon, completion  left femoral angiography, ProGlide closure right common femoral artery  access site.     Wound/Ulcer Pain Timing/Severity: none    2021  Here for f/u left post heel ulcer   Has wound vacc   Currently off has bone palpable  periwound inatct   has left >right ble swelling   No calf pain   Has no c/o  Rue picc without swelling or carley Tolerating doxycyline informed side effects of photosensitivity     Most Recent   Organism   Date Value Ref Range Status   03/08/2021 Staphylococcus coagulase-negative (A)  Final     PAST MEDICAL HISTORY      Diagnosis Date    [facility]erebral artery occlusion with [facility] infarction [facility]     Diabetes mellitus ([facility]     Type 2    Hemiparesis affecting left side as late effect of cerebrovascular accident ([facility] [address] [facility]     LEFT SIDE NON DOMINANT FOLLOWING STROKE    Hypertension      Past Surgical History:   Procedure Laterality Date    FINGER AMPUTATION      FOOT DEBRIDEMENT Left 2/16/2021    LEFT FOOT DEBRIDEMENT WITH BONE BIOPSY, WOUND VAC APPLICATION performed by Artemio Jacob DPM at [place] [id number] ARTHROSCOPY      TONSILLECTOMY      TRANSESOPHAGEAL ECHOCARDIOGRAM N/A 2/22/2021    TRANSESOPHAGEAL ECHOCARDIOGRAM WITH BUBBLE STUDY performed by Amy Trejo MD at [address] [place] [address] N/A 1/4/2021    EGD BIOPSY performed by Miguel Angel Dewitt DO at [place] [id number] reviewed. No pertinent family history.   Social History     Tobacco Use    Smoking status: Former Smoker    Smokeless tobacco: Never Used   Substance Use Topics    Alcohol use: Yes     Frequency: 4 or more times a week     Drinks per session: 5 or 6     Binge frequency: Daily or almost daily     Comment: every day drinker for most of adult life    Drug use: No     Allergies   Allergen Reactions    Pcn [Penicillins]      Current Outpatient Medications on File Prior to Encounter   Medication Sig Dispense Refill    acetaminophen (TYLENOL) 325 MG tablet Take 650 mg by mouth every 6 hours as needed for Pain      bisacodyl (DULCOLAX) 10 MG suppository Place 10 mg rectally daily      ferrous sulfate (IRON 325) 325 (65 Fe) MG tablet Take 325 mg by mouth daily (with breakfast)      Heparin Lock Flush (HEPARIN FLUSH, 1 UNITS/ML,) 1 UNIT/ML injection 1 Units as needed for Line Care      magnesium hydroxide (MILK OF MAGNESIA CONCENTRATE) 2400 MG/10ML SUSP Take 30 mLs by mouth daily as needed      Sodium Chloride Flush (SALINE FLUSH IV) Infuse intravenously 2 times daily Flush picc      collagenase 250 UNIT/GM ointment Apply topically daily Apply topically daily.  ascorbic acid (VITAMIN C) 500 MG tablet Take 500 mg by mouth daily      Cholecalciferol (VITAMIN D3) 1.25 MG (31839 UT) CAPS Take by mouth      zinc gluconate 50 MG tablet Take 50 mg by mouth daily      glucagon, rDNA, 1 MG injection Inject 1 mg into the muscle as needed for Low blood sugar (Blood glucose less than 70 mg/dL and patient NOT ALERT or NPO and does not have IV access. ) 1 each 0    cefepime (MAXIPIME) infusion Infuse 1,000 mg intravenously every 12 hours Compound per protocol 84 g 0    vancomycin (VANCOCIN) infusion Infuse 1,250 mg intravenously every 12 hours Compound per protocol.  (Patient taking differently: Infuse 1,000 mg intravenously every 12 hours Compound per protocol.) 910552 mg 0    vitamin D (ERGOCALCIFEROL) 1.25 MG (06748 UT) CAPS capsule Take 1 capsule by mouth once a week 5 capsule 0    ipratropium-albuterol (DUONEB) 0.5-2.5 (3) MG/3ML SOLN nebulizer solution Inhale 3 mLs into the lungs every 4 hours 360 mL 0    pantoprazole (PROTONIX) 40 MG tablet Take 1 tablet by mouth every morning (before breakfast) 30 tablet 3    melatonin 3 MG TABS tablet Take 9 mg by mouth nightly as needed      aspirin 81 MG EC tablet Take 81 mg by mouth daily      hydroCHLOROthiazide (HYDRODIURIL) 25 MG tablet Take 25 mg by mouth daily      glimepiride (AMARYL) 2 MG tablet Take 1 tablet by mouth daily (with breakfast) 30 tablet 3    insulin lispro (HUMALOG) 100 UNIT/ML injection vial Inject 0-12 Units into the skin 3 times daily (with meals) 1 vial 3    insulin lispro (HUMALOG) 100 UNIT/ML injection vial Inject 0-6 Units into the skin nightly 1 vial 3    metFORMIN (GLUCOPHAGE) 500 MG tablet Take 1 tablet by mouth 2 times daily (with meals) 60 tablet 3    atorvastatin (LIPITOR) 40 MG tablet Take 1 tablet by mouth nightly 30 tablet 3    clopidogrel (PLAVIX) 75 MG tablet Take 1 tablet by mouth daily 30 tablet 3    vitamin B-1 100 MG tablet Take 1 tablet by mouth daily 30 tablet 3    glucose (GLUTOSE) 40 % GEL Take 37.5 mLs by mouth as needed (hypoglycemia) 45 g 1     No current facility-administered medications on file prior to encounter. REVIEW OF SYSTEMS See HPI  Objective:    /60   Pulse 77   Temp 98.2 °F (36.8 °C) (Infrared)   Resp 20   Wt 182 lb (82.6 kg)   BMI 25.38 kg/m²   Wt Readings from Last 3 Encounters:   04/01/21 182 lb (82.6 kg)   03/08/21 182 lb (82.6 kg)   02/15/21 182 lb (82.6 kg)     PHYSICAL EXAM  CONSTITUTIONAL:   Awake, alert, cooperative    HEENT: AT/NC glasses  HEART: S1/S2  RS: CTAB  ABD: SOFT NT/ND  EXT:  EDEMA left  >right   SKIN:  Bruises ue Open wound Present post heel dec brendan Sanchez picc no swelling     Wound Care Documentation:  Wound 12/26/20 Arm Left (Active)   Number of days: 81       Wound 02/15/21 Heel Left (Active)   Wound Image   02/22/21 1400   Dressing Status Clean;Dry; Intact 02/23/21 0800   Wound Cleansed Cleansed with saline 02/22/21 1400   Dressing/Treatment Moist to dry 02/23/21 1007   Offloading for Diabetic Foot Ulcers Felt or foam 02/20/21 0009   Wound Length (cm) 5 cm 02/22/21 1400   Wound Width (cm) 4 cm 02/22/21 1400   Wound Depth (cm) 0.5 cm 02/22/21 1400   Wound Surface Area (cm^2) 20 cm^2 02/22/21 1400   Change in Wound Size % (l*w) -11.11 02/22/21 1400   Wound Volume (cm^3) 10 cm^3 02/22/21 1400   Wound Healing % 44 02/22/21 1400   Wound Assessment Pink/red 02/22/21 1400   Drainage Amount Small 02/22/21 1400   Drainage Description Serosanguinous 02/22/21 1400   Odor None 02/22/21 1400   Adrianne-wound Assessment Warm 02/23/21 0800   Number of days: 31       Wound 02/15/21 Heel Right (Active)   Wound Etiology Pressure Stage  3 02/17/21 1343   Dressing Status Clean;Dry; Intact 02/23/21 0800 Wound Cleansed Betadine/povidone iodine;Cleansed with saline 02/22/21 1400   Dressing/Treatment Foam 02/23/21 0800   Offloading for Diabetic Foot Ulcers Offloading boot 02/22/21 1400   Wound Length (cm) 0.5 cm 02/22/21 1400   Wound Width (cm) 0.5 cm 02/22/21 1400   Wound Depth (cm) 0.3 cm 02/17/21 1343   Wound Surface Area (cm^2) 0.25 cm^2 02/22/21 1400   Change in Wound Size % (l*w) 91.67 02/22/21 1400   Wound Volume (cm^3) 0.47 cm^3 02/17/21 1343   Wound Healing % 84 02/17/21 250 [address]; Other (Comment) 02/17/21 1343   Drainage Amount None 02/23/21 0800   Drainage Description Serosanguinous 02/22/21 1400   Odor None 02/22/21 1400   Number of days: 31       Wound 03/08/21 Heel Left #1 left heel aquired 12/1/20 (Active)   Wound Image   03/08/21 0934   Dressing Status New dressing applied 03/08/21 1022   Wound Cleansed Cleansed with saline 03/08/21 1022   Dressing/Treatment Alginate;ABD;Roll gauze 03/08/21 1022   Offloading for Diabetic Foot Ulcers Post op shoe 03/08/21 1022   Wound Length (cm) 3.7 cm 03/18/21 1307   Wound Width (cm) 2 cm 03/18/21 1307   Wound Depth (cm) 0.3 cm 03/18/21 1307   Wound Surface Area (cm^2) 7.4 cm^2 03/18/21 1307   Change in Wound Size % (l*w) 38.79 03/18/21 1307   Wound Volume (cm^3) 2.22 cm^3 03/18/21 1307   Wound Healing % 74 03/18/21 1307   Wound Assessment Pink/red 03/18/21 1307   Drainage Amount Moderate 03/18/21 1307   Drainage Description Serosanguinous 03/18/21 1307   Odor None 03/18/21 1307   Adrianne-wound Assessment Maceration 03/18/21 1307   Number of days: 10       Wound 03/08/21 Heel Right #2 rt heel aquired 12/1/20 (Active)   Wound Image   03/08/21 0934   Dressing Status New dressing applied 03/08/21 1022   Wound Cleansed Cleansed with saline 03/08/21 1022   Dressing/Treatment Alginate;Dry dressing;Roll gauze 03/08/21 1022   Offloading for Diabetic Foot Ulcers Post op shoe 03/08/21 1022   Wound Length (cm) 0.7 cm 03/18/21 1307   Wound Width (cm) 1 cm 03/18/21 1307   Wound Depth (cm) 0.2 cm 03/18/21 1307   Wound Surface Area (cm^2) 0.7 cm^2 03/18/21 1307   Change in Wound Size % (l*w) 36.36 03/18/21 1307   Wound Volume (cm^3) 0.14 cm^3 03/18/21 1307   Wound Healing % -27 03/18/21 1307   Wound Assessment Pink/red 03/18/21 1307   Drainage Amount Scant 03/18/21 1307   Drainage Description Yellow 03/18/21 1307   Odor None 03/18/21 1307   Adrianne-wound Assessment Maceration 03/18/21 1307   Number of days: 10     Assessment:   MSSA bacteremia follow off atbx  BILATERAL HEEL PRESSURE ULCERS   NECROTIC WOUND, OSTEOMYELITIS, Cellulitis left foot with palpable bone   S/p LEFT FOOT DEBRIDEMENT WITH BONE BIOPSY, WOUND VAC APPLICATION  Bone biopsy left heel: Medullary bone, negative for osteomyelitis. S/p angio    UTI  - proteus s/p rx with cefepime  H/o vitamin D deficiency on suppleemnts    2/14 wound cx Mixed Gram positive organisms   Proteus species   TTE Summary    Normal left ventricular size and systolic function.    Ejection fraction is visually estimated at 60-65%.    Indeterminate diastolic function.    No regional wall motion abnormalities seen.    Mild left ventricular concentric hypertrophy noted.    Normal right ventricular size and function.    The left atrium is mildly dilated.    Aortic valve leaflets are moderately calcified.    Aortic leaflets are thickened, likely due to degenerative changes, however    a small vegetation can not be excluded.    Mild aortic valve regurgitation.    No vegetations seen otherwise.       Plan:     vancomycin (VANCOCIN) 1,000 mg  Q12H can stop today/4/1/2021  Cont  doxycyline 100mg po q12 as suppression til seen by me in 4 weeks  Repeat blood cx prior to f/u with labs  allergic to pcn      Orders Placed This Encounter   Procedures    Culture, Blood 2    Culture, Blood 3    CBC WITH AUTO DIFFERENTIAL    COMPREHENSIVE METABOLIC PANEL    SEDIMENTATION RATE    C-REACTIVE PROTEIN    Apply dressing     Plan:   Treatment Note please see attached Discharge Instructions    Written patient dismissal instructions given to patient and signed by patient or POA. Discharge Instructions       Discharge condition: Stable     Assessment of pain at discharge: moderate     Anesthetic used: 4% liquid lidocaine solution      Discharge to: ECF     Left via:ambulance     Accompanied by: self     ECF/[facility]: [facility]     Dressing Orders:  Cleanse bilateral heel ulcers with normal saline solution and apply collagen covered with alginate to right heel cover with dry dressing and change every day or as needed for break through drainage.  To left heel pack with black foam and protect surrounding skin with transparent drape and apply wound VAC at 125 mm hhg continuous suction, change three times weekly.  Change canister weekly or as needed.  If VAC malfunctions or dislodges, remove and apply saline moist gauze and dry dressing changing daily until VAC can be reapplied.  Saline moist gauze applied in clinic today.     Treatment Orders:  Culture reviewed in clinic today.  Continue Doxy for suppression. May remove PICC line at facility today 4/1/21. Fletcher Maze prevalon boots or heel ayo while in bed.  May use surgical shoe and be 50% WB with PT.     [facility] followup visit _______1weeks Dr Harrell_______1 month Dr Toro_______________  (Please note your next appointment above and if you are unable to keep, kindly give a 24 hour notice.  Thank you.)     Physician signature:__________________________        If you experience any of the following, please call the [facility] during business hours:     * Increase in Pain  * Temperature over 101  * Increase in drainage from your wound  * Drainage with a foul odor  * Bleeding  * Increase in swelling  * Need for compression bandage changes due to slippage, breakthrough drainage.     If you need medical attention outside of the business hours of the [facility] please contact your PCP or go to the nearest emergency room.       Electronically signed by Xin Sheffield MD on 4/1/2021 at 2:55 PM

## 2021-04-05 ENCOUNTER — HOSPITAL ENCOUNTER (OUTPATIENT)
Dept: WOUND CARE | Age: 61
Discharge: HOME | End: 2021-04-05
Payer: MEDICARE

## 2021-04-05 VITALS
RESPIRATION RATE: 18 BRPM | BODY MASS INDEX: 25.48 KG/M2 | DIASTOLIC BLOOD PRESSURE: 70 MMHG | TEMPERATURE: 97.8 F | HEIGHT: 71 IN | WEIGHT: 182 LBS | HEART RATE: 84 BPM | SYSTOLIC BLOOD PRESSURE: 110 MMHG

## 2021-04-05 PROCEDURE — 11042 DBRDMT SUBQ TIS 1ST 20SQCM/<: CPT

## 2021-04-05 PROCEDURE — 87075 CULTR BACTERIA EXCEPT BLOOD: CPT

## 2021-04-05 PROCEDURE — 87070 CULTURE OTHR SPECIMN AEROBIC: CPT

## 2021-04-05 RX ORDER — LIDOCAINE HYDROCHLORIDE 40 MG/ML
SOLUTION TOPICAL ONCE
Status: DISCONTINUED | OUTPATIENT
Start: 2021-04-05 | End: 2021-04-06 | Stop reason: HOSPADM

## 2021-04-05 ASSESSMENT — PAIN DESCRIPTION - ORIENTATION: ORIENTATION: LEFT

## 2021-04-05 ASSESSMENT — PAIN DESCRIPTION - DESCRIPTORS: DESCRIPTORS: ACHING

## 2021-04-05 ASSESSMENT — PAIN DESCRIPTION - PAIN TYPE: TYPE: CHRONIC PAIN

## 2021-04-05 ASSESSMENT — PAIN SCALES - GENERAL: PAINLEVEL_OUTOF10: 1

## 2021-04-05 NOTE — PLAN OF CARE
Problem: Pain:  Goal: Pain level will decrease  Description: Pain level will decrease  Outcome: Ongoing  Goal: Control of acute pain  Description: Control of acute pain  Outcome: Ongoing  Goal: Control of chronic pain  Description: Control of chronic pain  Outcome: Ongoing     Problem: Wound:  Goal: Will show signs of wound healing; wound closure and no evidence of infection  Description: Will show signs of wound healing; wound closure and no evidence of infection  Outcome: Ongoing     Problem: Blood Glucose:  Goal: Ability to maintain appropriate glucose levels will improve  Description: Ability to maintain appropriate glucose levels will improve  Outcome: Ongoing

## 2021-04-05 NOTE — PROGRESS NOTES
[facility]  History and Physical/Consultation  Podiatry    Referring Physician : Karla Loera MD  [id number] [facility] RECORD NUMBER:  [id number]  AGE: 63 y.o. GENDER: male  : 1960  EPISODE DATE:  2021  Subjective:     Chief Complaint   Patient presents with    Wound Check     bilateral feet         HISTORY of PRESENT ILLNESS HPI     Bladimir Rodriguez is a 63 y.o. male who presents today for wound/ulcer evaluation. History of Wound Context:  The patient has had a wound of bilateral heels which was first noted approximately over a month ago. This has been treated with surgical debridement. On their initial visit to the wound healing center, 21 ,  the patient has noted that the wound has not been improving. The patient has had similar previous wounds in the past.      Pt is not on abx at time of initial visit. 3/22/21 - Wound VAC MWF continue to left heel. Aquacel Ag to right heel. Debrided toenails 1-5 in thickness and length. FU 1 week. IV Abx per Dr. Rayshawn Lorenz. 21 - Wound VAC MWF to left heel. Cultures obtained. FU 1 week after visit with Dr. Rayshawn Lorenz. PO Abx.       Wound/Ulcer Pain Timing/Severity: none  Quality of pain: N/A  Severity:  0 / 10   Modifying Factors: None  Associated Signs/Symptoms: none    Ulcer Identification:  Ulcer Type: arterial and diabetic  Contributing Factors: diabetes    Diabetic/Pressure/Non Pressure Ulcers onl y:  Ulcer: Diabetic ulcer, fat layer exposed    If patient has diabetic lower extremity wounds  Kong Classification of diabetic lower extremity wounds:    Grade Description   []  0 No open wound   []  1 Superficial ulcer involving the full skin thickness   [x]  2 Deep ulcer involves ligament, tendon, joint capsule, or fascia  No bone involvement or abscess presence   []  3 Deep Ulcer with abcess formation and/or osteomyelitis   []  4 Localized gangrene   []  5 Extensive gangrene of the foot     Wound: N/A        PAST MEDICAL HISTORY      Diagnosis Date    [facility]     Diabetes mellitus ([facility]     Type 2    Hemiparesis affecting left side as late effect of cerebrovascular accident ([facility] [facility]     LEFT SIDE NON DOMINANT FOLLOWING STROKE    Hypertension      Past Surgical History:   Procedure Laterality Date    FINGER AMPUTATION      FOOT DEBRIDEMENT Left 2/16/2021    LEFT FOOT DEBRIDEMENT WITH BONE BIOPSY, WOUND VAC APPLICATION performed by Benitez De Dios DPM at [place] [address] ARTHROSCOPY      TONSILLECTOMY      TRANSESOPHAGEAL ECHOCARDIOGRAM N/A 2/22/2021    TRANSESOPHAGEAL ECHOCARDIOGRAM WITH BUBBLE STUDY performed by Alexandra Nails MD at [address] [place] [address],[address] N/A 1/4/2021    EGD BIOPSY performed by Jak Ceja DO at [place] [address] reviewed. No pertinent family history.   Social History     Tobacco Use    Smoking status: Former Smoker    Smokeless tobacco: Never Used   Substance Use Topics    Alcohol use: Yes     Frequency: 4 or more times a week     Drinks per session: 5 or 6     Binge frequency: Daily or almost daily     Comment: every day drinker for most of adult life    Drug use: No     Allergies   Allergen Reactions    Pcn [Penicillins]      Current Outpatient Medications on File Prior to Encounter   Medication Sig Dispense Refill    acetaminophen (TYLENOL) 325 MG tablet Take 650 mg by mouth every 6 hours as needed for Pain      bisacodyl (DULCOLAX) 10 MG suppository Place 10 mg rectally daily      ferrous sulfate (IRON 325) 325 (65 Fe) MG tablet Take 325 mg by mouth daily (with breakfast)      Heparin Lock Flush (HEPARIN FLUSH, 1 UNITS/ML,) 1 UNIT/ML injection 1 Units as needed for Line Care      magnesium hydroxide (MILK OF MAGNESIA CONCENTRATE) 2400 MG/10ML SUSP Take 30 mLs by mouth daily as needed      Sodium Chloride Flush (SALINE FLUSH IV) Infuse intravenously 2 times daily Flush picc      collagenase 250 UNIT/GM ointment Apply topically daily Apply topically daily.  ascorbic acid (VITAMIN C) 500 MG tablet Take 500 mg by mouth daily      Cholecalciferol (VITAMIN D3) 1.25 MG (03735 UT) CAPS Take by mouth      zinc gluconate 50 MG tablet Take 50 mg by mouth daily      glucose (GLUTOSE) 40 % GEL Take 37.5 mLs by mouth as needed (hypoglycemia) 45 g 1    glucagon, rDNA, 1 MG injection Inject 1 mg into the muscle as needed for Low blood sugar (Blood glucose less than 70 mg/dL and patient NOT ALERT or NPO and does not have IV access. ) 1 each 0    vitamin D (ERGOCALCIFEROL) 1.25 MG (54538 UT) CAPS capsule Take 1 capsule by mouth once a week 5 capsule 0    ipratropium-albuterol (DUONEB) 0.5-2.5 (3) MG/3ML SOLN nebulizer solution Inhale 3 mLs into the lungs every 4 hours 360 mL 0    pantoprazole (PROTONIX) 40 MG tablet Take 1 tablet by mouth every morning (before breakfast) 30 tablet 3    melatonin 3 MG TABS tablet Take 9 mg by mouth nightly as needed      aspirin 81 MG EC tablet Take 81 mg by mouth daily      hydroCHLOROthiazide (HYDRODIURIL) 25 MG tablet Take 25 mg by mouth daily      glimepiride (AMARYL) 2 MG tablet Take 1 tablet by mouth daily (with breakfast) 30 tablet 3    insulin lispro (HUMALOG) 100 UNIT/ML injection vial Inject 0-12 Units into the skin 3 times daily (with meals) 1 vial 3    insulin lispro (HUMALOG) 100 UNIT/ML injection vial Inject 0-6 Units into the skin nightly 1 vial 3    metFORMIN (GLUCOPHAGE) 500 MG tablet Take 1 tablet by mouth 2 times daily (with meals) 60 tablet 3    atorvastatin (LIPITOR) 40 MG tablet Take 1 tablet by mouth nightly 30 tablet 3    clopidogrel (PLAVIX) 75 MG tablet Take 1 tablet by mouth daily 30 tablet 3    vitamin B-1 100 MG tablet Take 1 tablet by mouth daily 30 tablet 3     No current facility-administered medications on file prior to encounter.         REVIEW OF SYSTEMS   ROS : All others Negative if blank [], Positive if [x]  General Vascular   [] Fevers [] Claudication   [] Chills [] Rest Pain   Skin Neurologic   [x] Tissue Loss [x] Lower extremity neuropathy     Objective:    /70   Pulse 84   Temp 97.8 °F (36.6 °C) (Temporal)   Resp 18   Ht 5' 11\" (1.803 m)   Wt 182 lb (82.6 kg)   BMI 25.38 kg/m²   Wt Readings from Last 3 Encounters:   04/05/21 182 lb (82.6 kg)   04/01/21 182 lb (82.6 kg)   03/08/21 182 lb (82.6 kg)       PHYSICAL EXAM   CONSTITUTIONAL:   Awake, alert, cooperative   PSYCHIATRIC :  Oriented to time, place and person      normal insight to disease process  EXTREMITIES:   R LE Open wounds are noted   Skin color is normal   Edema is not noted   Sensation deficit noted - to foot   Palpation of the foot does cause pain   5/5 strength DF/PF  L LE Open wounds are noted   Skin color is abnormal with hyperpigmentation   Edema is not noted   Sensation deficit noted - bilateral feet   Palpation of the foot does cause pain   5/5 strength DF/PF  R dorsalis pedis +2 L dorsalis pedis +2   R posterior tibial +2 L posterior tibial +2     Assessment:     Problem List Items Addressed This Visit     None          Pre Debridement Measurements:  Are located in the [place]  Documentation Flow Sheet  Post Debridement Measurements:  Wound/Ulcer Descriptions are Pre Debridement except measurements:     Wound 12/26/20 Arm Left (Active)   Number of days: 99       Wound 02/15/21 Heel Left (Active)   Number of days: 49       Wound 02/15/21 Heel Right (Active)   Number of days: 49       Wound 03/08/21 Heel Left #1 left heel aquired 12/1/20 (Active)   Wound Image   03/22/21 0911   Dressing Status New dressing applied 03/18/21 1352   Wound Cleansed Cleansed with saline 04/01/21 1536   Dressing/Treatment Moist to moist;Roll gauze 04/01/21 1536   Offloading for Diabetic Foot Ulcers Post op shoe 04/01/21 1536   Wound Length (cm) 4.5 cm 04/05/21 0929   Wound Width (cm) 3 cm 04/05/21 0929   Wound Depth (cm) 0.4 cm 04/05/21 0929   Wound Surface Area (cm^2) 13.5 cm^2 04/05/21 7287   Change in Wound Size % (l*w) -11.66 04/05/21 0929   Wound Volume (cm^3) 5.4 cm^3 04/05/21 0929   Wound Healing % 36 04/05/21 0929   Post-Procedure Length (cm) 3.9 cm 03/22/21 0954   Post-Procedure Width (cm) 2 cm 03/22/21 0954   Post-Procedure Depth (cm) 0.8 cm 03/22/21 0954   Post-Procedure Surface Area (cm^2) 7.8 cm^2 03/22/21 0954   Post-Procedure Volume (cm^3) 6.24 cm^3 03/22/21 0954   Wound Assessment Pale granulation tissue;Slough 04/05/21 0929   Drainage Amount Moderate 04/05/21 0929   Drainage Description Serous; Yellow 04/05/21 0929   Odor None 04/05/21 0929   Adrianne-wound Assessment Maceration 04/05/21 0929   Number of days: 27       Wound 03/08/21 Heel Right #2 rt heel aquired 12/1/20 (Active)   Wound Image   04/05/21 0929   Dressing Status New dressing applied 03/18/21 1352   Wound Cleansed Cleansed with saline 04/01/21 1536   Dressing/Treatment Alginate;Collagen;Silicone border 52/17/18 1536   Offloading for Diabetic Foot Ulcers Post op shoe 04/01/21 1536   Wound Length (cm) 0 cm 04/05/21 0929   Wound Width (cm) 0 cm 04/05/21 0929   Wound Depth (cm) 0 cm 04/05/21 0929   Wound Surface Area (cm^2) 0 cm^2 04/05/21 0929   Change in Wound Size % (l*w) 100 04/05/21 0929   Wound Volume (cm^3) 0 cm^3 04/05/21 0929   Wound Healing % 100 04/05/21 0929   Post-Procedure Length (cm) 0.7 cm 03/22/21 0954   Post-Procedure Width (cm) 0.4 cm 03/22/21 0954   Post-Procedure Depth (cm) 0.2 cm 03/22/21 0954   Post-Procedure Surface Area (cm^2) 0.28 cm^2 03/22/21 0954   Post-Procedure Volume (cm^3) 0.06 cm^3 03/22/21 0954   Wound Assessment Fibrin 04/01/21 1357   Drainage Amount None 04/01/21 1357   Drainage Description Yellow 03/22/21 0911   Odor None 04/01/21 1357   Adrianne-wound Assessment Maceration 04/01/21 1357   Number of days: 27     Incision 02/19/21 Groin Right (Active)   Number of days: 44       Procedure Note  Indications:  Based on my examination of this patient's wound(s)/ulcer(s) today, debridement is required to promote healing and evaluate the wound base. Performed by: Kanika Stuart DPM    Consent obtained:  Yes    Time out taken:  Yes    Pain Control: Anesthetic  Anesthetic: 4% Lidocaine Liquid Topical     Debridement:Excisional Debridement    Using curette the wound(s)/ulcer(s) was/were sharply debrided down through and including the removal of subcutaneous tissue. Devitalized Tissue Debrided:  slough to stimulate bleeding to promote healing, post debridement good bleeding base and wound edges noted    Wound/Ulcer #: 2    Percent of Wound/Ulcer Debrided: 100%    Total Surface Area Debrided:  13.5 sq cm     Estimated Blood Loss:  None  Hemostasis Achieved:  by pressure    Procedural Pain:  2  / 10   Post Procedural Pain:  2 / 10     Response to treatment:  Well tolerated by patient. A culture was done. Plan:     Pt is a smoker   - Discussed relationship of smoking and negative affects on wound healing   - Emphasized importance of tobacco avoidace/cessation   - Script for nicotine patch given to patient   - Information regarding support groups for smoking cessation given    In my professional opinion and based off the information that is available at this time this patient has appropriate indication for HBO Therapy: Maybe    Treatment Note please see attached Discharge Instructions    Written patient dismissal instructions given to patient and signed by patient or POA.            Electronically signed by Kanika Stuart DPM on 4/5/2021 at 10:10 AM

## 2021-04-07 LAB
ANAEROBIC CULTURE: NORMAL
ORGANISM: ABNORMAL
WOUND/ABSCESS: ABNORMAL

## 2021-04-12 ENCOUNTER — HOSPITAL ENCOUNTER (OUTPATIENT)
Dept: WOUND CARE | Age: 61
Discharge: HOME | End: 2021-04-12
Payer: MEDICARE

## 2021-04-12 VITALS
HEART RATE: 82 BPM | WEIGHT: 182 LBS | TEMPERATURE: 97.7 F | DIASTOLIC BLOOD PRESSURE: 62 MMHG | HEIGHT: 71 IN | BODY MASS INDEX: 25.48 KG/M2 | SYSTOLIC BLOOD PRESSURE: 130 MMHG | RESPIRATION RATE: 20 BRPM

## 2021-04-12 PROCEDURE — 11042 DBRDMT SUBQ TIS 1ST 20SQCM/<: CPT

## 2021-04-12 RX ORDER — LIDOCAINE HYDROCHLORIDE 40 MG/ML
SOLUTION TOPICAL ONCE
Status: DISCONTINUED | OUTPATIENT
Start: 2021-04-12 | End: 2021-04-13 | Stop reason: HOSPADM

## 2021-04-12 ASSESSMENT — PAIN SCALES - GENERAL: PAINLEVEL_OUTOF10: 0

## 2021-04-12 NOTE — PROGRESS NOTES
Extensive gangrene of the foot     Wound: N/A        PAST MEDICAL HISTORY      Diagnosis Date    [facility]bral [facility] ([facility] [address] [facility]     Diabetes mellitus ([facility] [address] [facility]     Type 2    Hemiparesis affecting left side as late effect of cerebrovascular accident ([facility] [address]     LEFT SIDE NON DOMINANT FOLLOWING STROKE    Hypertension      Past Surgical History:   Procedure Laterality Date    FINGER AMPUTATION      FOOT DEBRIDEMENT Left 2/16/2021    LEFT FOOT DEBRIDEMENT WITH BONE BIOPSY, WOUND VAC APPLICATION performed by Adrienne Dale DPM at [place] [address] ARTHROSCOPY      TONSILLECTOMY      TRANSESOPHAGEAL ECHOCARDIOGRAM N/A 2/22/2021    TRANSESOPHAGEAL ECHOCARDIOGRAM WITH BUBBLE STUDY performed by Qi Mendez MD at [address] [place] N/A 1/4/2021    EGD BIOPSY performed by Saeid Esqueda DO at [place] [address] reviewed. No pertinent family history.   Social History     Tobacco Use    Smoking status: Former Smoker    Smokeless tobacco: Never Used   Substance Use Topics    Alcohol use: Yes     Frequency: 4 or more times a week     Drinks per session: 5 or 6     Binge frequency: Daily or almost daily     Comment: every day drinker for most of adult life    Drug use: No     Allergies   Allergen Reactions    Pcn [Penicillins]      Current Outpatient Medications on File Prior to Encounter   Medication Sig Dispense Refill    acetaminophen (TYLENOL) 325 MG tablet Take 650 mg by mouth every 6 hours as needed for Pain      bisacodyl (DULCOLAX) 10 MG suppository Place 10 mg rectally daily      ferrous sulfate (IRON 325) 325 (65 Fe) MG tablet Take 325 mg by mouth daily (with breakfast)      Heparin Lock Flush (HEPARIN FLUSH, 1 UNITS/ML,) 1 UNIT/ML injection 1 Units as needed for Line Care      magnesium hydroxide (MILK OF MAGNESIA CONCENTRATE) 2400 MG/10ML SUSP Take 30 mLs by mouth daily as needed      Sodium Chloride Flush (SALINE FLUSH IV) Infuse intravenously 2 times daily Flush picc      collagenase 250 UNIT/GM ointment Apply topically daily Apply topically daily.  ascorbic acid (VITAMIN C) 500 MG tablet Take 500 mg by mouth daily      Cholecalciferol (VITAMIN D3) 1.25 MG (58295 UT) CAPS Take by mouth      zinc gluconate 50 MG tablet Take 50 mg by mouth daily      glucose (GLUTOSE) 40 % GEL Take 37.5 mLs by mouth as needed (hypoglycemia) 45 g 1    glucagon, rDNA, 1 MG injection Inject 1 mg into the muscle as needed for Low blood sugar (Blood glucose less than 70 mg/dL and patient NOT ALERT or NPO and does not have IV access. ) 1 each 0    vitamin D (ERGOCALCIFEROL) 1.25 MG (46523 UT) CAPS capsule Take 1 capsule by mouth once a week 5 capsule 0    ipratropium-albuterol (DUONEB) 0.5-2.5 (3) MG/3ML SOLN nebulizer solution Inhale 3 mLs into the lungs every 4 hours 360 mL 0    pantoprazole (PROTONIX) 40 MG tablet Take 1 tablet by mouth every morning (before breakfast) 30 tablet 3    melatonin 3 MG TABS tablet Take 9 mg by mouth nightly as needed      aspirin 81 MG EC tablet Take 81 mg by mouth daily      hydroCHLOROthiazide (HYDRODIURIL) 25 MG tablet Take 25 mg by mouth daily      glimepiride (AMARYL) 2 MG tablet Take 1 tablet by mouth daily (with breakfast) 30 tablet 3    insulin lispro (HUMALOG) 100 UNIT/ML injection vial Inject 0-12 Units into the skin 3 times daily (with meals) 1 vial 3    insulin lispro (HUMALOG) 100 UNIT/ML injection vial Inject 0-6 Units into the skin nightly 1 vial 3    metFORMIN (GLUCOPHAGE) 500 MG tablet Take 1 tablet by mouth 2 times daily (with meals) 60 tablet 3    atorvastatin (LIPITOR) 40 MG tablet Take 1 tablet by mouth nightly 30 tablet 3    clopidogrel (PLAVIX) 75 MG tablet Take 1 tablet by mouth daily 30 tablet 3    vitamin B-1 100 MG tablet Take 1 tablet by mouth daily 30 tablet 3     No current facility-administered medications on file prior to encounter.         REVIEW OF SYSTEMS   ROS : All others Negative if blank [], Positive if [x]  General Vascular   [] Fevers [] Claudication   [] Chills [] Rest Pain   Skin Neurologic   [x] Tissue Loss [x] Lower extremity neuropathy     Objective:    /62   Pulse 82   Temp 97.7 °F (36.5 °C) (Temporal)   Resp 20   Ht 5' 11\" (1.803 m)   Wt 182 lb (82.6 kg)   BMI 25.38 kg/m²   Wt Readings from Last 3 Encounters:   04/12/21 182 lb (82.6 kg)   04/05/21 182 lb (82.6 kg)   04/01/21 182 lb (82.6 kg)       PHYSICAL EXAM   CONSTITUTIONAL:   Awake, alert, cooperative   PSYCHIATRIC :  Oriented to time, place and person      normal insight to disease process  EXTREMITIES:   R LE Open wounds are noted   Skin color is normal   Edema is not noted   Sensation deficit noted - to foot   Palpation of the foot does cause pain   5/5 strength DF/PF  L LE Open wounds are noted   Skin color is abnormal with hyperpigmentation   Edema is not noted   Sensation deficit noted - bilateral feet   Palpation of the foot does cause pain   5/5 strength DF/PF  R dorsalis pedis +2 L dorsalis pedis +2   R posterior tibial +2 L posterior tibial +2     Assessment:     Problem List Items Addressed This Visit     None          Pre Debridement Measurements:  Are located in the [place]  Documentation Flow Sheet  Post Debridement Measurements:  Wound/Ulcer Descriptions are Pre Debridement except measurements:     Wound 12/26/20 Arm Left (Active)   Number of days: 106       Wound 02/15/21 Heel Left (Active)   Number of days: 56       Wound 02/15/21 Heel Right (Active)   Number of days: 56       Wound 03/08/21 Heel Left #1 left heel aquired 12/1/20 (Active)   Wound Image   03/22/21 0911   Dressing Status New dressing applied 04/05/21 1114   Wound Cleansed Cleansed with saline 04/05/21 1114   Dressing/Treatment Roll gauze; Moist to dry 04/05/21 1114   Offloading for Diabetic Foot Ulcers Post op shoe 04/05/21 1114   Wound Length (cm) 4.4 cm 04/12/21 1038   Wound Width (cm) 2.3 cm 04/12/21 1038   Wound Depth

## 2021-04-12 NOTE — PLAN OF CARE
Problem: Pain:  Goal: Pain level will decrease  Description: Pain level will decrease  Outcome: Met This Shift  Goal: Control of acute pain  Description: Control of acute pain  Outcome: Met This Shift  Goal: Control of chronic pain  Description: Control of chronic pain  Outcome: Met This Shift     Problem: Wound:  Goal: Will show signs of wound healing; wound closure and no evidence of infection  Description: Will show signs of wound healing; wound closure and no evidence of infection  Outcome: Met This Shift     Problem: Blood Glucose:  Goal: Ability to maintain appropriate glucose levels will improve  Description: Ability to maintain appropriate glucose levels will improve  Outcome: Met This Shift

## 2021-04-13 LAB
AFB CULTURE (MYCOBACTERIA): NORMAL
AFB CULTURE (MYCOBACTERIA): NORMAL
AFB SMEAR: NORMAL
AFB SMEAR: NORMAL

## 2021-04-19 ENCOUNTER — HOSPITAL ENCOUNTER (OUTPATIENT)
Dept: WOUND CARE | Age: 61
Discharge: HOME | End: 2021-04-19
Payer: MEDICARE

## 2021-04-19 VITALS
DIASTOLIC BLOOD PRESSURE: 82 MMHG | TEMPERATURE: 97.7 F | HEIGHT: 71 IN | WEIGHT: 182 LBS | SYSTOLIC BLOOD PRESSURE: 128 MMHG | RESPIRATION RATE: 18 BRPM | HEART RATE: 84 BPM | BODY MASS INDEX: 25.48 KG/M2

## 2021-04-19 PROCEDURE — 11042 DBRDMT SUBQ TIS 1ST 20SQCM/<: CPT

## 2021-04-19 RX ORDER — LIDOCAINE HYDROCHLORIDE 20 MG/ML
JELLY TOPICAL ONCE
Status: CANCELLED | OUTPATIENT
Start: 2021-04-19 | End: 2021-04-19

## 2021-04-19 RX ORDER — GENTAMICIN SULFATE 1 MG/G
OINTMENT TOPICAL ONCE
Status: CANCELLED | OUTPATIENT
Start: 2021-04-19 | End: 2021-04-19

## 2021-04-19 RX ORDER — BACITRACIN, NEOMYCIN, POLYMYXIN B 400; 3.5; 5 [USP'U]/G; MG/G; [USP'U]/G
OINTMENT TOPICAL ONCE
Status: CANCELLED | OUTPATIENT
Start: 2021-04-19 | End: 2021-04-19

## 2021-04-19 RX ORDER — LIDOCAINE 50 MG/G
OINTMENT TOPICAL ONCE
Status: CANCELLED | OUTPATIENT
Start: 2021-04-19 | End: 2021-04-19

## 2021-04-19 RX ORDER — BACITRACIN ZINC AND POLYMYXIN B SULFATE 500; 1000 [USP'U]/G; [USP'U]/G
OINTMENT TOPICAL ONCE
Status: CANCELLED | OUTPATIENT
Start: 2021-04-19 | End: 2021-04-19

## 2021-04-19 RX ORDER — BETAMETHASONE DIPROPIONATE 0.05 %
OINTMENT (GRAM) TOPICAL ONCE
Status: CANCELLED | OUTPATIENT
Start: 2021-04-19 | End: 2021-04-19

## 2021-04-19 RX ORDER — LIDOCAINE HYDROCHLORIDE 40 MG/ML
SOLUTION TOPICAL ONCE
Status: CANCELLED | OUTPATIENT
Start: 2021-04-19 | End: 2021-04-19

## 2021-04-19 RX ORDER — GINSENG 100 MG
CAPSULE ORAL ONCE
Status: CANCELLED | OUTPATIENT
Start: 2021-04-19 | End: 2021-04-19

## 2021-04-19 RX ORDER — LIDOCAINE HYDROCHLORIDE 40 MG/ML
SOLUTION TOPICAL ONCE
Status: DISCONTINUED | OUTPATIENT
Start: 2021-04-19 | End: 2021-04-20 | Stop reason: HOSPADM

## 2021-04-19 RX ORDER — LIDOCAINE 40 MG/G
CREAM TOPICAL ONCE
Status: CANCELLED | OUTPATIENT
Start: 2021-04-19 | End: 2021-04-19

## 2021-04-19 RX ORDER — CLOBETASOL PROPIONATE 0.5 MG/G
OINTMENT TOPICAL ONCE
Status: CANCELLED | OUTPATIENT
Start: 2021-04-19 | End: 2021-04-19

## 2021-04-19 NOTE — DISCHARGE INSTR - COC
Isolation          No Isolation        Patient Infection Status     Infection Onset Added Last Indicated Last Indicated By Review Planned Expiration Resolved Resolved By    None active    Resolved    COVID-19 Rule Out 02/18/21 02/19/21 02/19/21 COVID-19 (Ordered)   02/21/21 Rule-Out Test Resulted    COVID-19 Rule Out 01/04/21 01/04/21 01/04/21 COVID-19 (Ordered)   01/04/21 Rule-Out Test Resulted    COVID-19 Rule Out 12/26/20 12/26/20 12/26/20 COVID-19 (Ordered)   12/26/20 Rule-Out Test Resulted          Nurse Assessment:  Last Vital Signs: There were no vitals taken for this visit.     Last documented pain score (0-10 scale):    Last Weight:   Wt Readings from Last 1 Encounters:   04/12/21 182 lb (82.6 kg)     Mental Status:  {IP PT MENTAL STATUS:54479}    IV Access:  { KELLY IV ACCESS:168341861}    Nursing Mobility/ADLs:  Walking   {CHP DME DLSA:105730415}  Transfer  {CHP DME KNXI:371172288}  Bathing  {CHP DME PMMN:759503671}  Dressing  {CHP DME PMML:608541723}  Toileting  {CHP DME YCQJ:602452662}  Feeding  {CHP DME MMSN:453021191}  Med Admin  {CHP DME XVMQ:414792581}  Med Delivery   { KELLY MED Delivery:287505959}    Wound Care Documentation and Therapy:  Wound 12/26/20 Arm Left (Active)   Number of days: 113       Wound 02/15/21 Heel Left (Active)   Number of days: 63       Wound 02/15/21 Heel Right (Active)   Number of days: 63       Wound 03/08/21 Heel Left #1 left heel aquired 12/1/20 (Active)   Wound Image   03/22/21 0911   Dressing Status New dressing applied 04/12/21 1227   Wound Cleansed Cleansed with saline 04/12/21 1227   Dressing/Treatment Alginate with Ag;Dry dressing 04/12/21 1227   Offloading for Diabetic Foot Ulcers Post op shoe 04/12/21 1227   Wound Length (cm) 4.4 cm 04/12/21 1038   Wound Width (cm) 2.3 cm 04/12/21 1038   Wound Depth (cm) 0.4 cm 04/12/21 1038   Wound Surface Area (cm^2) 10.12 cm^2 04/12/21 1038   Change in Wound Size % (l*w) 16.29 04/12/21 1038   Wound Volume (cm^3) 4.05 cm^3 21 1038   Wound Healing % 52 21 1038   Post-Procedure Length (cm) 4.4 cm 21 1057   Post-Procedure Width (cm) 2.3 cm 21 1057   Post-Procedure Depth (cm) 0.4 cm 21 1057   Post-Procedure Surface Area (cm^2) 10.12 cm^2 21 1057   Post-Procedure Volume (cm^3) 4.05 cm^3 21 1057   Wound Assessment Pale granulation tissue;Slough 21 1038   Drainage Amount Moderate 21 1038   Drainage Description Serous; Yellow 21 1038   Odor None 21 1038   Adrianne-wound Assessment Maceration 21 1038   Number of days: 42       Wound 21 Heel Right #2 rt heel aquired 20 (Active)   Wound Image   21 0929   Wound Cleansed Cleansed with saline 21 1536   Dressing/Treatment Alginate;Collagen;Silicone border / 1536   Offloading for Diabetic Foot Ulcers Post op shoe 21 1536   Wound Length (cm) 0 cm 21 0929   Wound Width (cm) 0 cm 21 0929   Wound Depth (cm) 0 cm 21 0929   Wound Surface Area (cm^2) 0 cm^2 21 0929   Change in Wound Size % (l*w) 100 21 0929   Wound Volume (cm^3) 0 cm^3 21 0929   Wound Healing % 100 21 0929   Post-Procedure Length (cm) 0 cm 21 1011   Post-Procedure Width (cm) 0 cm 21 1011   Post-Procedure Depth (cm) 0 cm 21 1011   Post-Procedure Surface Area (cm^2) 0 cm^2 21 1011   Post-Procedure Volume (cm^3) 0 cm^3 21 1011   Wound Assessment Fibrin 21 1357   Drainage Amount None 21 1357   Drainage Description Yellow 21 0911   Odor None 21 1357   Adrianne-wound Assessment Maceration 21 1357   Number of days: 42        Elimination:  Continence:   · Bowel: {YES / EA:15186}  · Bladder: {YES / X}  Urinary Catheter: {Urinary Catheter:350969612}   Colostomy/Ileostomy/Ileal Conduit: {YES / QU:84610}       Date of Last BM: ***  No intake or output data in the 24 hours ending 21 1053  No intake/output data recorded.     Safety Concerns: Update Admission H&P: {CHP DME Changes in ACQYA:068322178}    PHYSICIAN SIGNATURE:  {Esignature:495850586}

## 2021-04-19 NOTE — PLAN OF CARE
Problem: Wound:  Goal: Will show signs of wound healing; wound closure and no evidence of infection  Description: Will show signs of wound healing; wound closure and no evidence of infection  Outcome: Met This Shift     Problem: Blood Glucose:  Goal: Ability to maintain appropriate glucose levels will improve  Description: Ability to maintain appropriate glucose levels will improve  Outcome: Met This Shift

## 2021-04-19 NOTE — PROGRESS NOTES
abscess presence   []  3 Deep Ulcer with abcess formation and/or osteomyelitis   []  4 Localized gangrene   []  5 Extensive gangrene of the foot     Wound: N/A        PAST MEDICAL HISTORY      Diagnosis Date    [facility]erebral [facility]     Diabetes mellitus ([facility]     Type 2    Hemiparesis affecting left side as late effect of cerebrovascular accident ([facility]     LEFT SIDE NON DOMINANT FOLLOWING STROKE    Hypertension      Past Surgical History:   Procedure Laterality Date    FINGER AMPUTATION      FOOT DEBRIDEMENT Left 2/16/2021    LEFT FOOT DEBRIDEMENT WITH BONE BIOPSY, WOUND VAC APPLICATION performed by Afua Chicas DPM at [place] [address] ARTHROSCOPY      TONSILLECTOMY      TRANSESOPHAGEAL ECHOCARDIOGRAM N/A 2/22/2021    TRANSESOPHAGEAL ECHOCARDIOGRAM WITH BUBBLE STUDY performed by Dottie Roper MD at [address] [place] [address] N/A 1/4/2021    EGD BIOPSY performed by Sam Burton DO at [place] [address] reviewed. No pertinent family history.   Social History     Tobacco Use    Smoking status: Former Smoker    Smokeless tobacco: Never Used   Substance Use Topics    Alcohol use: Yes     Frequency: 4 or more times a week     Drinks per session: 5 or 6     Binge frequency: Daily or almost daily     Comment: every day drinker for most of adult life    Drug use: No     Allergies   Allergen Reactions    Pcn [Penicillins]      Current Outpatient Medications on File Prior to Encounter   Medication Sig Dispense Refill    acetaminophen (TYLENOL) 325 MG tablet Take 650 mg by mouth every 6 hours as needed for Pain      bisacodyl (DULCOLAX) 10 MG suppository Place 10 mg rectally daily      ferrous sulfate (IRON 325) 325 (65 Fe) MG tablet Take 325 mg by mouth daily (with breakfast)      Heparin Lock Flush (HEPARIN FLUSH, 1 UNITS/ML,) 1 UNIT/ML injection 1 Units as needed for Line Care      magnesium hydroxide (MILK OF MAGNESIA CONCENTRATE) 2400 MG/10ML SUSP Take 30 mLs by mouth daily as needed      Sodium Chloride Flush (SALINE FLUSH IV) Infuse intravenously 2 times daily Flush picc      collagenase 250 UNIT/GM ointment Apply topically daily Apply topically daily.  ascorbic acid (VITAMIN C) 500 MG tablet Take 500 mg by mouth daily      Cholecalciferol (VITAMIN D3) 1.25 MG (48303 UT) CAPS Take by mouth      zinc gluconate 50 MG tablet Take 50 mg by mouth daily      glucose (GLUTOSE) 40 % GEL Take 37.5 mLs by mouth as needed (hypoglycemia) 45 g 1    glucagon, rDNA, 1 MG injection Inject 1 mg into the muscle as needed for Low blood sugar (Blood glucose less than 70 mg/dL and patient NOT ALERT or NPO and does not have IV access. ) 1 each 0    vitamin D (ERGOCALCIFEROL) 1.25 MG (14853 UT) CAPS capsule Take 1 capsule by mouth once a week 5 capsule 0    ipratropium-albuterol (DUONEB) 0.5-2.5 (3) MG/3ML SOLN nebulizer solution Inhale 3 mLs into the lungs every 4 hours 360 mL 0    pantoprazole (PROTONIX) 40 MG tablet Take 1 tablet by mouth every morning (before breakfast) 30 tablet 3    melatonin 3 MG TABS tablet Take 9 mg by mouth nightly as needed      aspirin 81 MG EC tablet Take 81 mg by mouth daily      hydroCHLOROthiazide (HYDRODIURIL) 25 MG tablet Take 25 mg by mouth daily      glimepiride (AMARYL) 2 MG tablet Take 1 tablet by mouth daily (with breakfast) 30 tablet 3    insulin lispro (HUMALOG) 100 UNIT/ML injection vial Inject 0-12 Units into the skin 3 times daily (with meals) 1 vial 3    insulin lispro (HUMALOG) 100 UNIT/ML injection vial Inject 0-6 Units into the skin nightly 1 vial 3    metFORMIN (GLUCOPHAGE) 500 MG tablet Take 1 tablet by mouth 2 times daily (with meals) 60 tablet 3    atorvastatin (LIPITOR) 40 MG tablet Take 1 tablet by mouth nightly 30 tablet 3    clopidogrel (PLAVIX) 75 MG tablet Take 1 tablet by mouth daily 30 tablet 3    vitamin B-1 100 MG tablet Take 1 tablet by mouth daily 30 tablet 3     No current facility-administered medications on file prior to encounter.         REVIEW OF SYSTEMS   ROS : All others Negative if blank [], Positive if [x]  General Vascular   [] Fevers [] Claudication   [] Chills [] Rest Pain   Skin Neurologic   [x] Tissue Loss [x] Lower extremity neuropathy     Objective:    /82   Pulse 84   Temp 97.7 °F (36.5 °C) (Temporal)   Resp 18   Ht 5' 11\" (1.803 m)   Wt 182 lb (82.6 kg)   BMI 25.38 kg/m²   Wt Readings from Last 3 Encounters:   04/19/21 182 lb (82.6 kg)   04/12/21 182 lb (82.6 kg)   04/05/21 182 lb (82.6 kg)       PHYSICAL EXAM   CONSTITUTIONAL:   Awake, alert, cooperative   PSYCHIATRIC :  Oriented to time, place and person      normal insight to disease process  EXTREMITIES:   R LE Open wounds are noted   Skin color is normal   Edema is not noted   Sensation deficit noted - to foot   Palpation of the foot does cause pain   5/5 strength DF/PF  L LE Open wounds are noted   Skin color is abnormal with hyperpigmentation   Edema is not noted   Sensation deficit noted - bilateral feet   Palpation of the foot does cause pain   5/5 strength DF/PF  R dorsalis pedis +2 L dorsalis pedis +2   R posterior tibial +2 L posterior tibial +2     Assessment:     Problem List Items Addressed This Visit     None          Pre Debridement Measurements:  Are located in the [place]  Documentation Flow Sheet  Post Debridement Measurements:  Wound/Ulcer Descriptions are Pre Debridement except measurements:     Wound 12/26/20 Arm Left (Active)   Number of days: 113       Wound 02/15/21 Heel Left (Active)   Number of days: 63       Wound 02/15/21 Heel Right (Active)   Number of days: 63       Wound 03/08/21 Heel Left #1 left heel aquired 12/1/20 (Active)   Wound Image   03/22/21 0911   Dressing Status New dressing applied 04/12/21 1227   Wound Cleansed Cleansed with saline 04/12/21 1227   Dressing/Treatment Alginate with Ag;Dry dressing 04/12/21 1227   Offloading for Diabetic Foot Ulcers Post op shoe 04/12/21 1227   Wound Length (cm) 4.5 cm 04/19/21 1104   Wound Width (cm) 2.9 cm 04/19/21 1104   Wound Depth (cm) 0.5 cm 04/19/21 1104   Wound Surface Area (cm^2) 13.05 cm^2 04/19/21 1104   Change in Wound Size % (l*w) -7.94 04/19/21 1104   Wound Volume (cm^3) 6.52 cm^3 04/19/21 1104   Wound Healing % 23 04/19/21 1104   Post-Procedure Length (cm) 4.5 cm 04/19/21 1132   Post-Procedure Width (cm) 2.9 cm 04/19/21 1132   Post-Procedure Depth (cm) 0.5 cm 04/19/21 1132   Post-Procedure Surface Area (cm^2) 13.05 cm^2 04/19/21 1132   Post-Procedure Volume (cm^3) 6.52 cm^3 04/19/21 1132   Wound Assessment Pale granulation tissue;Slough 04/19/21 1104   Drainage Amount Moderate 04/19/21 1104   Drainage Description Serosanguinous 04/19/21 1104   Odor None 04/19/21 1104   Darianne-wound Assessment Maceration 04/19/21 1104   Number of days: 42       Wound 03/08/21 Heel Right #2 rt heel aquired 12/1/20 (Active)   Wound Image   04/05/21 0929   Dressing Status New dressing applied 03/18/21 1352   Wound Cleansed Cleansed with saline 04/01/21 1536   Dressing/Treatment Alginate;Collagen;Silicone border 79/08/35 1536   Offloading for Diabetic Foot Ulcers Post op shoe 04/01/21 1536   Wound Length (cm) 0 cm 04/05/21 0929   Wound Width (cm) 0 cm 04/05/21 0929   Wound Depth (cm) 0 cm 04/05/21 0929   Wound Surface Area (cm^2) 0 cm^2 04/05/21 0929   Change in Wound Size % (l*w) 100 04/05/21 0929   Wound Volume (cm^3) 0 cm^3 04/05/21 0929   Wound Healing % 100 04/05/21 0929   Post-Procedure Length (cm) 0 cm 04/05/21 1011   Post-Procedure Width (cm) 0 cm 04/05/21 1011   Post-Procedure Depth (cm) 0 cm 04/05/21 1011   Post-Procedure Surface Area (cm^2) 0 cm^2 04/05/21 1011   Post-Procedure Volume (cm^3) 0 cm^3 04/05/21 1011   Wound Assessment Fibrin 04/01/21 1357   Drainage Amount None 04/01/21 1357   Drainage Description Yellow 03/22/21 0911   Odor None 04/01/21 1357   Adrianne-wound Assessment Maceration 04/01/21 1357   Number of days: 42 Incision 02/19/21 Groin Right (Active)   Number of days: 58       Procedure Note  Indications:  Based on my examination of this patient's wound(s)/ulcer(s) today, debridement is required to promote healing and evaluate the wound base. Performed by: Radha Wick DPM    Consent obtained:  Yes    Time out taken:  Yes    Pain Control: Anesthetic  Anesthetic: 4% Lidocaine Liquid Topical     Debridement:Excisional Debridement    Using curette the wound(s)/ulcer(s) was/were sharply debrided down through and including the removal of subcutaneous tissue. Devitalized Tissue Debrided:  slough to stimulate bleeding to promote healing, post debridement good bleeding base and wound edges noted    Wound/Ulcer #: 2    Percent of Wound/Ulcer Debrided: 100%    Total Surface Area Debrided:  13 sq cm     Estimated Blood Loss:  None  Hemostasis Achieved:  by pressure    Procedural Pain:  2  / 10   Post Procedural Pain:  2 / 10     Response to treatment:  Well tolerated by patient. A culture was done. Plan:     Pt is a smoker   - Discussed relationship of smoking and negative affects on wound healing   - Emphasized importance of tobacco avoidace/cessation   - Script for nicotine patch given to patient   - Information regarding support groups for smoking cessation given    In my professional opinion and based off the information that is available at this time this patient has appropriate indication for HBO Therapy: Maybe    Treatment Note please see attached Discharge Instructions    Written patient dismissal instructions given to patient and signed by patient or POA.            Electronically signed by Radha Wick DPM on 4/19/2021 at 11:38 AM

## 2021-04-29 ENCOUNTER — HOSPITAL ENCOUNTER (OUTPATIENT)
Dept: WOUND CARE | Age: 61
Discharge: HOME | End: 2021-04-29
Payer: MEDICARE

## 2021-04-29 VITALS
SYSTOLIC BLOOD PRESSURE: 118 MMHG | RESPIRATION RATE: 17 BRPM | HEART RATE: 79 BPM | DIASTOLIC BLOOD PRESSURE: 78 MMHG | TEMPERATURE: 98.4 F

## 2021-04-29 DIAGNOSIS — E11.628 DIABETIC FOOT INFECTION (HCC): Primary | ICD-10-CM

## 2021-04-29 DIAGNOSIS — L08.9 DIABETIC FOOT INFECTION (HCC): Primary | ICD-10-CM

## 2021-04-29 PROCEDURE — 87075 CULTR BACTERIA EXCEPT BLOOD: CPT

## 2021-04-29 PROCEDURE — 99213 OFFICE O/P EST LOW 20 MIN: CPT

## 2021-04-29 PROCEDURE — 87070 CULTURE OTHR SPECIMN AEROBIC: CPT

## 2021-04-29 PROCEDURE — 87186 SC STD MICRODIL/AGAR DIL: CPT

## 2021-04-29 RX ORDER — LIDOCAINE 40 MG/G
CREAM TOPICAL ONCE
Status: CANCELLED | OUTPATIENT
Start: 2021-04-29 | End: 2021-04-29

## 2021-04-29 RX ORDER — GINSENG 100 MG
CAPSULE ORAL ONCE
Status: CANCELLED | OUTPATIENT
Start: 2021-04-29 | End: 2021-04-29

## 2021-04-29 RX ORDER — LIDOCAINE HYDROCHLORIDE 20 MG/ML
JELLY TOPICAL ONCE
Status: CANCELLED | OUTPATIENT
Start: 2021-04-29 | End: 2021-04-29

## 2021-04-29 RX ORDER — LIDOCAINE 50 MG/G
OINTMENT TOPICAL ONCE
Status: CANCELLED | OUTPATIENT
Start: 2021-04-29 | End: 2021-04-29

## 2021-04-29 RX ORDER — DOXYCYCLINE HYCLATE 100 MG/1
100 CAPSULE ORAL 2 TIMES DAILY
COMMUNITY
End: 2021-12-22 | Stop reason: SDUPTHER

## 2021-04-29 RX ORDER — BACITRACIN, NEOMYCIN, POLYMYXIN B 400; 3.5; 5 [USP'U]/G; MG/G; [USP'U]/G
OINTMENT TOPICAL ONCE
Status: CANCELLED | OUTPATIENT
Start: 2021-04-29 | End: 2021-04-29

## 2021-04-29 RX ORDER — CLOBETASOL PROPIONATE 0.5 MG/G
OINTMENT TOPICAL ONCE
Status: CANCELLED | OUTPATIENT
Start: 2021-04-29 | End: 2021-04-29

## 2021-04-29 RX ORDER — GLUCOSA SU 2KCL/CHONDROITIN SU 500-400 MG
1 CAPSULE ORAL DAILY
COMMUNITY

## 2021-04-29 RX ORDER — GENTAMICIN SULFATE 1 MG/G
OINTMENT TOPICAL ONCE
Status: CANCELLED | OUTPATIENT
Start: 2021-04-29 | End: 2021-04-29

## 2021-04-29 RX ORDER — BETAMETHASONE DIPROPIONATE 0.05 %
OINTMENT (GRAM) TOPICAL ONCE
Status: CANCELLED | OUTPATIENT
Start: 2021-04-29 | End: 2021-04-29

## 2021-04-29 RX ORDER — LIDOCAINE HYDROCHLORIDE 40 MG/ML
SOLUTION TOPICAL ONCE
Status: CANCELLED | OUTPATIENT
Start: 2021-04-29 | End: 2021-04-29

## 2021-04-29 RX ORDER — BACITRACIN ZINC AND POLYMYXIN B SULFATE 500; 1000 [USP'U]/G; [USP'U]/G
OINTMENT TOPICAL ONCE
Status: CANCELLED | OUTPATIENT
Start: 2021-04-29 | End: 2021-04-29

## 2021-04-29 ASSESSMENT — PAIN SCALES - GENERAL: PAINLEVEL_OUTOF10: 0

## 2021-04-29 NOTE — PROGRESS NOTES
[facility] Followup Visit Note  Referring Physician : Felicity Louis MD  [id number] [facility] RECORD NUMBER:  [id number]  AGE: 63 y.o. GENDER: male  : 1960  EPISODE DATE:  2021  Subjective:     Chief Complaint   Patient presents with    Wound Check     bilateral feet      HISTORY of PRESENT ILLNESS HPI   Diana Conley is a 63 y.o. male who presents today in regards to follow up evaluation and treatment of wound/ulcer. That patient's past medical, family and social hx were reviewed and changes were made if present. History of Wound Context:  Pt is known to ID s/p D/c from [facility] on  2021  Came in with 1. Left heel ulceration and necrosis of muscle. 2.  Peripheral arterial disease. 3. Acute osteomyelitis, left foot. 4. Cellulitis, left foot with abscess. Current visit:  2021  Still at [facility] on doxycycline he has no isues with it  Left heel has some pain  He is asking about more weight bearing     Sp vancomycin (VANCOCIN) 1,000 mg  Q12H can stop today/2021  Here for f/u left post heel ulcer   Has wound vacc   Currently off has bone palpable  periwound inatct   has left >right ble swelling   No calf pain   Has no c/o  Rue picc without swelling or pain  Tolerating doxycyline informed side effects of photosensitivity     Bilateral iliac angiogram, left femoral  angiography, nitroglycerin infusion left common femoral artery 1000 mcg  x2 boluses, right common femoral artery to left popliteal artery  catheterization, PTA of the left SFA and popliteal artery with 4 x 80  RapidCross balloon, 5 x 300 Saber balloon, 5 x 220 Powerflex balloon, 6  x 100 Powerflex balloon and 6 x 250 IN. PACT Admiral balloon, completion  left femoral angiography, ProGlide closure right common femoral artery  access site.  1. Excision and debridement of the left heel ulceration to and through  level of deep fascia and muscle.   Total measurement is 4.5 cm x 6.0 cm x  0.5 cm in dimension. 2.  Incision and drainage, left foot abscess. 3.  Bone biopsy, left foot. 4.  Application of wound VAC negative pressure therapy. cx Enterococcus  Path Bone biopsy left heel: Medullary bone, negative for osteomyelitis.         Most Recent   Organism   Date Value Ref Range Status   04/05/2021 Coagulase Negative Staphylococci (A)  Final     PAST MEDICAL HISTORY      Diagnosis Date    [facility]     Diabetes mellitus ([facility]     Type 2    Hemiparesis affecting left side as late effect of cerebrovascular accident ([facility]     LEFT SIDE NON DOMINANT FOLLOWING STROKE    Hypertension      Past Surgical History:   Procedure Laterality Date    FINGER AMPUTATION      FOOT DEBRIDEMENT Left 2/16/2021    LEFT FOOT DEBRIDEMENT WITH BONE BIOPSY, WOUND VAC APPLICATION performed by Neela Liz DPM at [place] [address] ARTHROSCOPY      TONSILLECTOMY      TRANSESOPHAGEAL ECHOCARDIOGRAM N/A 2/22/2021    TRANSESOPHAGEAL ECHOCARDIOGRAM WITH BUBBLE STUDY performed by Wang Mandujano MD at [facility] N/A 1/4/2021    EGD BIOPSY performed by Karina Rees DO at [place] [address] reviewed. No pertinent family history.   Social History     Tobacco Use    Smoking status: Former Smoker    Smokeless tobacco: Never Used   Substance Use Topics    Alcohol use: Yes     Frequency: 4 or more times a week     Drinks per session: 5 or 6     Binge frequency: Daily or almost daily     Comment: every day drinker for most of adult life    Drug use: No     Allergies   Allergen Reactions    Pcn [Penicillins]      Current Outpatient Medications on File Prior to Encounter   Medication Sig Dispense Refill    doxycycline hyclate (VIBRAMYCIN) 100 MG capsule Take 100 mg by mouth 2 times daily      B Complex-C-E-Zn (STRESS B/ZINC) TABS Take 1 tablet by mouth daily      acetaminophen (TYLENOL) 325 MG tablet Take 650 mg by mouth doxycyline 100mg po q12 as suppression til seen by me in 4 weeks  A wound culture was done  Repeat blood cx prior to f/u with labs  allergic to pcn   F/u 2 weeks     Orders Placed This Encounter   Procedures    Initiate Outpatient Wound Care Protocol    Culture, Wound    Culture, Anaerobic     Plan:   Treatment Note please see attached Discharge Instructions    Written patient dismissal instructions given to patient and signed by patient or POA. Discharge Instructions          Discharge condition: Stable     Assessment of pain at discharge: moderate     Anesthetic used: 4% liquid lidocaine solution      Discharge to: ECF     Left via:ambulance     Accompanied by: self     ECF/HHA: [employer]     Dressing Orders:  Cleanse left heel ulcer with normal saline solution apply plain alginate and dry dressing and change every other day or as needed for drainage.  STOP wound VAC.     RIGHT HEEL -HEALED- may leave open to air. Continue offloading boots as ordered.     Treatment Orders:    Continue antibiotics per Dr Ashley Bledsoe for suppression. Berkley Knapp prevalon boots or heel ayo while in bed.       May be partial weight bearing up to 50% on left with surgical shoe.     Tissue culture obtained at [facility] today 4-5-21- if negative consider graft.      [facility] followup visit:________Dr Toro 2 weeks_______________________ 1 week Dr Harrell____________________________  (Please note your next appointment above and if you are unable to keep, kindly give a 24 hour notice.  Thank you.)     Physician signature:__________________________        If you experience any of the following, please call the [address] [place] [address] during business hours:     * Increase in Pain  * Temperature over 101  * Increase in drainage from your wound  * Drainage with a foul odor  * Bleeding  * Increase in swelling  * Need for compression bandage changes due to slippage, breakthrough drainage.     If you need medical attention outside of the business hours of the [address] please contact your PCP or go to the nearest emergency room.                  Electronically signed by Darline Roach MD on 4/29/2021 at 1:37 PM

## 2021-05-01 LAB
ANAEROBIC CULTURE: NORMAL
ORGANISM: ABNORMAL
ORGANISM: ABNORMAL
WOUND/ABSCESS: ABNORMAL
WOUND/ABSCESS: ABNORMAL

## 2021-05-03 ENCOUNTER — HOSPITAL ENCOUNTER (OUTPATIENT)
Dept: WOUND CARE | Age: 61
Discharge: HOME | End: 2021-05-03
Payer: MEDICARE

## 2021-05-03 VITALS
SYSTOLIC BLOOD PRESSURE: 110 MMHG | TEMPERATURE: 98 F | BODY MASS INDEX: 25.48 KG/M2 | RESPIRATION RATE: 18 BRPM | HEART RATE: 80 BPM | HEIGHT: 71 IN | DIASTOLIC BLOOD PRESSURE: 62 MMHG | WEIGHT: 182 LBS

## 2021-05-03 DIAGNOSIS — L97.526 NON-PRESSURE CHRONIC ULCER OF OTHER PART OF LEFT FOOT WITH BONE INVOLVEMENT WITHOUT EVIDENCE OF NECROSIS (HCC): ICD-10-CM

## 2021-05-03 DIAGNOSIS — L08.9 DIABETIC FOOT INFECTION (HCC): Primary | ICD-10-CM

## 2021-05-03 DIAGNOSIS — E11.628 DIABETIC FOOT INFECTION (HCC): Primary | ICD-10-CM

## 2021-05-03 PROCEDURE — 11042 DBRDMT SUBQ TIS 1ST 20SQCM/<: CPT

## 2021-05-03 PROCEDURE — 6370000000 HC RX 637 (ALT 250 FOR IP): Performed by: PODIATRIST

## 2021-05-03 RX ORDER — BACITRACIN ZINC AND POLYMYXIN B SULFATE 500; 1000 [USP'U]/G; [USP'U]/G
OINTMENT TOPICAL ONCE
Status: CANCELLED | OUTPATIENT
Start: 2021-05-03 | End: 2021-05-03

## 2021-05-03 RX ORDER — CLOBETASOL PROPIONATE 0.5 MG/G
OINTMENT TOPICAL ONCE
Status: CANCELLED | OUTPATIENT
Start: 2021-05-03 | End: 2021-05-03

## 2021-05-03 RX ORDER — LIDOCAINE HYDROCHLORIDE 40 MG/ML
SOLUTION TOPICAL ONCE
Status: COMPLETED | OUTPATIENT
Start: 2021-05-03 | End: 2021-05-03

## 2021-05-03 RX ORDER — BETAMETHASONE DIPROPIONATE 0.05 %
OINTMENT (GRAM) TOPICAL ONCE
Status: CANCELLED | OUTPATIENT
Start: 2021-05-03 | End: 2021-05-03

## 2021-05-03 RX ORDER — LIDOCAINE HYDROCHLORIDE 40 MG/ML
SOLUTION TOPICAL ONCE
Status: CANCELLED | OUTPATIENT
Start: 2021-05-03 | End: 2021-05-03

## 2021-05-03 RX ORDER — LIDOCAINE HYDROCHLORIDE 20 MG/ML
JELLY TOPICAL ONCE
Status: CANCELLED | OUTPATIENT
Start: 2021-05-03 | End: 2021-05-03

## 2021-05-03 RX ORDER — GENTAMICIN SULFATE 1 MG/G
OINTMENT TOPICAL ONCE
Status: CANCELLED | OUTPATIENT
Start: 2021-05-03 | End: 2021-05-03

## 2021-05-03 RX ORDER — GINSENG 100 MG
CAPSULE ORAL ONCE
Status: CANCELLED | OUTPATIENT
Start: 2021-05-03 | End: 2021-05-03

## 2021-05-03 RX ORDER — LIDOCAINE 50 MG/G
OINTMENT TOPICAL ONCE
Status: CANCELLED | OUTPATIENT
Start: 2021-05-03 | End: 2021-05-03

## 2021-05-03 RX ORDER — LIDOCAINE 40 MG/G
CREAM TOPICAL ONCE
Status: CANCELLED | OUTPATIENT
Start: 2021-05-03 | End: 2021-05-03

## 2021-05-03 RX ORDER — BACITRACIN, NEOMYCIN, POLYMYXIN B 400; 3.5; 5 [USP'U]/G; MG/G; [USP'U]/G
OINTMENT TOPICAL ONCE
Status: CANCELLED | OUTPATIENT
Start: 2021-05-03 | End: 2021-05-03

## 2021-05-03 RX ADMIN — LIDOCAINE HYDROCHLORIDE: 40 SOLUTION TOPICAL at 10:05

## 2021-05-03 NOTE — PROGRESS NOTES
ligament, tendon, joint capsule, or fascia  No bone involvement or abscess presence   []  3 Deep Ulcer with abcess formation and/or osteomyelitis   []  4 Localized gangrene   []  5 Extensive gangrene of the foot     Wound: N/A        PAST MEDICAL HISTORY      Diagnosis Date    [facility]bral [facility] occlusion [facility]     Diabetes mellitus ([facility]     Type 2    Hemiparesis affecting left side as late effect of cerebrovascular accident ([facility]     LEFT SIDE NON DOMINANT FOLLOWING STROKE    Hypertension      Past Surgical History:   Procedure Laterality Date    FINGER AMPUTATION      FOOT DEBRIDEMENT Left 2/16/2021    LEFT FOOT DEBRIDEMENT WITH BONE BIOPSY, WOUND VAC APPLICATION performed by Edwar Payan DPM at [place] [address] ARTHROSCOPY      TONSILLECTOMY      TRANSESOPHAGEAL ECHOCARDIOGRAM N/A 2/22/2021    TRANSESOPHAGEAL ECHOCARDIOGRAM WITH BUBBLE STUDY performed by Conchita Israel MD at [address] [place] [address] N/A 1/4/2021    EGD BIOPSY performed by Nakita Taylor DO at [address]. No pertinent family history.   Social History     Tobacco Use    Smoking status: Former Smoker    Smokeless tobacco: Never Used   Substance Use Topics    Alcohol use: Yes     Frequency: 4 or more times a week     Drinks per session: 5 or 6     Binge frequency: Daily or almost daily     Comment: every day drinker for most of adult life    Drug use: No     Allergies   Allergen Reactions    Pcn [Penicillins]      Current Outpatient Medications on File Prior to Encounter   Medication Sig Dispense Refill    doxycycline hyclate (VIBRAMYCIN) 100 MG capsule Take 100 mg by mouth 2 times daily      B Complex-C-E-Zn (STRESS B/ZINC) TABS Take 1 tablet by mouth daily      acetaminophen (TYLENOL) 325 MG tablet Take 650 mg by mouth every 6 hours as needed for Pain      bisacodyl (DULCOLAX) 10 MG suppository Place 10 mg rectally daily      ferrous sulfate (IRON 325) 325 (65 Fe) MG tablet Take 325 mg by mouth daily (with breakfast)      Heparin Lock Flush (HEPARIN FLUSH, 1 UNITS/ML,) 1 UNIT/ML injection 1 Units as needed for Line Care      magnesium hydroxide (MILK OF MAGNESIA CONCENTRATE) 2400 MG/10ML SUSP Take 30 mLs by mouth daily as needed      Sodium Chloride Flush (SALINE FLUSH IV) Infuse intravenously 2 times daily Flush picc      collagenase 250 UNIT/GM ointment Apply topically daily Apply topically daily.  ascorbic acid (VITAMIN C) 500 MG tablet Take 500 mg by mouth daily      Cholecalciferol (VITAMIN D3) 1.25 MG (08418 UT) CAPS Take by mouth      zinc gluconate 50 MG tablet Take 50 mg by mouth daily      glucose (GLUTOSE) 40 % GEL Take 37.5 mLs by mouth as needed (hypoglycemia) 45 g 1    glucagon, rDNA, 1 MG injection Inject 1 mg into the muscle as needed for Low blood sugar (Blood glucose less than 70 mg/dL and patient NOT ALERT or NPO and does not have IV access. ) 1 each 0    vitamin D (ERGOCALCIFEROL) 1.25 MG (29158 UT) CAPS capsule Take 1 capsule by mouth once a week 5 capsule 0    ipratropium-albuterol (DUONEB) 0.5-2.5 (3) MG/3ML SOLN nebulizer solution Inhale 3 mLs into the lungs every 4 hours 360 mL 0    pantoprazole (PROTONIX) 40 MG tablet Take 1 tablet by mouth every morning (before breakfast) 30 tablet 3    melatonin 3 MG TABS tablet Take 9 mg by mouth nightly as needed      aspirin 81 MG EC tablet Take 81 mg by mouth daily      hydroCHLOROthiazide (HYDRODIURIL) 25 MG tablet Take 25 mg by mouth daily      glimepiride (AMARYL) 2 MG tablet Take 1 tablet by mouth daily (with breakfast) 30 tablet 3    insulin lispro (HUMALOG) 100 UNIT/ML injection vial Inject 0-12 Units into the skin 3 times daily (with meals) 1 vial 3    insulin lispro (HUMALOG) 100 UNIT/ML injection vial Inject 0-6 Units into the skin nightly 1 vial 3    metFORMIN (GLUCOPHAGE) 500 MG tablet Take 1 tablet by mouth 2 times daily (with meals) 60 tablet 3    atorvastatin (LIPITOR) 40 MG tablet Take 1 tablet by mouth nightly 30 tablet 3    clopidogrel (PLAVIX) 75 MG tablet Take 1 tablet by mouth daily 30 tablet 3    vitamin B-1 100 MG tablet Take 1 tablet by mouth daily 30 tablet 3     No current facility-administered medications on file prior to encounter.         REVIEW OF SYSTEMS   ROS : All others Negative if blank [], Positive if [x]  General Vascular   [] Fevers [] Claudication   [] Chills [] Rest Pain   Skin Neurologic   [x] Tissue Loss [x] Lower extremity neuropathy     Objective:    /62   Pulse 80   Temp 98 °F (36.7 °C) (Temporal)   Resp 18   Ht 5' 11\" (1.803 m)   Wt 182 lb (82.6 kg)   BMI 25.38 kg/m²   Wt Readings from Last 3 Encounters:   05/03/21 182 lb (82.6 kg)   04/19/21 182 lb (82.6 kg)   04/12/21 182 lb (82.6 kg)       PHYSICAL EXAM   CONSTITUTIONAL:   Awake, alert, cooperative   PSYCHIATRIC :  Oriented to time, place and person      normal insight to disease process  EXTREMITIES:   R LE Open wounds are noted   Skin color is normal   Edema is not noted   Sensation deficit noted - to foot   Palpation of the foot does cause pain   5/5 strength DF/PF  L LE Open wounds are noted   Skin color is abnormal with hyperpigmentation   Edema is not noted   Sensation deficit noted - bilateral feet   Palpation of the foot does cause pain   5/5 strength DF/PF  R dorsalis pedis +2 L dorsalis pedis +2   R posterior tibial +2 L posterior tibial +2     Assessment:     Problem List Items Addressed This Visit     Diabetic foot infection ([facility] [address] - Primary    Relevant Orders    Initiate Outpatient Wound Care Protocol          Pre Debridement Measurements:  Are located in the [place]  Documentation Flow Sheet  Post Debridement Measurements:  Wound/Ulcer Descriptions are Pre Debridement except measurements:     Wound 12/26/20 Arm Left (Active)   Number of days: 127       Wound 02/15/21 Heel Left (Active)   Number of days: 77       Wound 02/15/21 Heel Right (Active)   Number of days: 77       Wound 03/08/21 Heel Left #1 left heel aquired 12/1/20 (Active)   Wound Image   05/03/21 1000   Dressing Status New dressing applied 04/29/21 1339   Wound Cleansed Cleansed with saline 04/29/21 1339   Dressing/Treatment Alginate;Dry dressing;Roll gauze 04/29/21 1339   Offloading for Diabetic Foot Ulcers Post op shoe 04/29/21 1339   Wound Length (cm) 4 cm 05/03/21 1000   Wound Width (cm) 2.8 cm 05/03/21 1000   Wound Depth (cm) 0.5 cm 05/03/21 1000   Wound Surface Area (cm^2) 11.2 cm^2 05/03/21 1000   Change in Wound Size % (l*w) 7.36 05/03/21 1000   Wound Volume (cm^3) 5.6 cm^3 05/03/21 1000   Wound Healing % 34 05/03/21 1000   Post-Procedure Length (cm) 4 cm 05/03/21 1012   Post-Procedure Width (cm) 2.8 cm 05/03/21 1012   Post-Procedure Depth (cm) 0.5 cm 05/03/21 1012   Post-Procedure Surface Area (cm^2) 11.2 cm^2 05/03/21 1012   Post-Procedure Volume (cm^3) 5.6 cm^3 05/03/21 1012   Wound Assessment Pale granulation tissue;Slough 05/03/21 1000   Drainage Amount Moderate 05/03/21 1000   Drainage Description Serosanguinous; Yellow 05/03/21 1000   Odor None 05/03/21 1000   Adrianne-wound Assessment Maceration; Intact 05/03/21 1000   Number of days: 55       Wound 03/08/21 Heel Right #2 rt heel aquired 12/1/20 (Active)   Wound Image   04/05/21 0929   Dressing Status New dressing applied 03/18/21 1352   Wound Cleansed Cleansed with saline 04/01/21 1536   Dressing/Treatment Alginate;Collagen;Silicone border 19/10/96 1536   Offloading for Diabetic Foot Ulcers Post op shoe 04/01/21 1536   Wound Length (cm) 0 cm 04/05/21 0929   Wound Width (cm) 0 cm 04/05/21 0929   Wound Depth (cm) 0 cm 04/05/21 0929   Wound Surface Area (cm^2) 0 cm^2 04/05/21 0929   Change in Wound Size % (l*w) 100 04/05/21 0929   Wound Volume (cm^3) 0 cm^3 04/05/21 0929   Wound Healing % 100 04/05/21 0929   Post-Procedure Length (cm) 0 cm 04/05/21 1011   Post-Procedure Width (cm) 0 cm 04/05/21 1011   Post-Procedure Depth (cm) 0 cm 04/05/21 1011 signed by Benitez De Dios DPM on 5/3/2021 at 10:14 AM

## 2021-05-10 ENCOUNTER — HOSPITAL ENCOUNTER (OUTPATIENT)
Dept: WOUND CARE | Age: 61
Discharge: HOME | End: 2021-05-10
Payer: MEDICARE

## 2021-05-10 VITALS
TEMPERATURE: 97.4 F | HEART RATE: 88 BPM | RESPIRATION RATE: 19 BRPM | SYSTOLIC BLOOD PRESSURE: 108 MMHG | DIASTOLIC BLOOD PRESSURE: 64 MMHG

## 2021-05-10 DIAGNOSIS — E11.628 DIABETIC FOOT INFECTION (HCC): Primary | ICD-10-CM

## 2021-05-10 DIAGNOSIS — L08.9 DIABETIC FOOT INFECTION (HCC): Primary | ICD-10-CM

## 2021-05-10 PROCEDURE — 87075 CULTR BACTERIA EXCEPT BLOOD: CPT

## 2021-05-10 PROCEDURE — 6370000000 HC RX 637 (ALT 250 FOR IP): Performed by: PODIATRIST

## 2021-05-10 PROCEDURE — 87186 SC STD MICRODIL/AGAR DIL: CPT

## 2021-05-10 PROCEDURE — 11042 DBRDMT SUBQ TIS 1ST 20SQCM/<: CPT

## 2021-05-10 PROCEDURE — 87070 CULTURE OTHR SPECIMN AEROBIC: CPT

## 2021-05-10 RX ORDER — BETAMETHASONE DIPROPIONATE 0.05 %
OINTMENT (GRAM) TOPICAL ONCE
Status: CANCELLED | OUTPATIENT
Start: 2021-05-10 | End: 2021-05-10

## 2021-05-10 RX ORDER — BACITRACIN, NEOMYCIN, POLYMYXIN B 400; 3.5; 5 [USP'U]/G; MG/G; [USP'U]/G
OINTMENT TOPICAL ONCE
Status: CANCELLED | OUTPATIENT
Start: 2021-05-10 | End: 2021-05-10

## 2021-05-10 RX ORDER — LIDOCAINE 40 MG/G
CREAM TOPICAL ONCE
Status: CANCELLED | OUTPATIENT
Start: 2021-05-10 | End: 2021-05-10

## 2021-05-10 RX ORDER — BACITRACIN ZINC AND POLYMYXIN B SULFATE 500; 1000 [USP'U]/G; [USP'U]/G
OINTMENT TOPICAL ONCE
Status: CANCELLED | OUTPATIENT
Start: 2021-05-10 | End: 2021-05-10

## 2021-05-10 RX ORDER — LIDOCAINE HYDROCHLORIDE 20 MG/ML
JELLY TOPICAL ONCE
Status: CANCELLED | OUTPATIENT
Start: 2021-05-10 | End: 2021-05-10

## 2021-05-10 RX ORDER — GINSENG 100 MG
CAPSULE ORAL ONCE
Status: CANCELLED | OUTPATIENT
Start: 2021-05-10 | End: 2021-05-10

## 2021-05-10 RX ORDER — GENTAMICIN SULFATE 1 MG/G
OINTMENT TOPICAL ONCE
Status: CANCELLED | OUTPATIENT
Start: 2021-05-10 | End: 2021-05-10

## 2021-05-10 RX ORDER — LIDOCAINE 50 MG/G
OINTMENT TOPICAL ONCE
Status: CANCELLED | OUTPATIENT
Start: 2021-05-10 | End: 2021-05-10

## 2021-05-10 RX ORDER — CLOBETASOL PROPIONATE 0.5 MG/G
OINTMENT TOPICAL ONCE
Status: CANCELLED | OUTPATIENT
Start: 2021-05-10 | End: 2021-05-10

## 2021-05-10 RX ORDER — LIDOCAINE HYDROCHLORIDE 40 MG/ML
SOLUTION TOPICAL ONCE
Status: COMPLETED | OUTPATIENT
Start: 2021-05-10 | End: 2021-05-10

## 2021-05-10 RX ORDER — LIDOCAINE HYDROCHLORIDE 40 MG/ML
SOLUTION TOPICAL ONCE
Status: CANCELLED | OUTPATIENT
Start: 2021-05-10 | End: 2021-05-10

## 2021-05-10 RX ADMIN — LIDOCAINE HYDROCHLORIDE 5 ML: 40 SOLUTION TOPICAL at 10:26

## 2021-05-10 ASSESSMENT — PAIN DESCRIPTION - FREQUENCY: FREQUENCY: INTERMITTENT

## 2021-05-10 ASSESSMENT — PAIN SCALES - GENERAL: PAINLEVEL_OUTOF10: 8

## 2021-05-10 ASSESSMENT — PAIN - FUNCTIONAL ASSESSMENT: PAIN_FUNCTIONAL_ASSESSMENT: ACTIVITIES ARE NOT PREVENTED

## 2021-05-10 ASSESSMENT — PAIN DESCRIPTION - ONSET: ONSET: ON-GOING

## 2021-05-10 ASSESSMENT — PAIN DESCRIPTION - ORIENTATION: ORIENTATION: LEFT

## 2021-05-10 NOTE — PLAN OF CARE
Problem: Pain:  Goal: Pain level will decrease  Description: Pain level will decrease  Outcome: Ongoing  Goal: Control of chronic pain  Description: Control of chronic pain  Outcome: Ongoing     Problem: Wound:  Goal: Will show signs of wound healing; wound closure and no evidence of infection  Description: Will show signs of wound healing; wound closure and no evidence of infection  Outcome: Ongoing

## 2021-05-10 NOTE — PROGRESS NOTES
[facility]  History and Physical/Consultation  Podiatry    Referring Physician : Jayce Webber MD  [id number] [facility] RECORD NUMBER:  [id number]  AGE: 63 y.o. GENDER: male  : 1960  EPISODE DATE:  5/10/2021  Subjective:     Chief Complaint   Patient presents with    Wound Check     lle         HISTORY of PRESENT ILLNESS HPI     Kofi Laguna is a 63 y.o. male who presents today for wound/ulcer evaluation. History of Wound Context:  The patient has had a wound of bilateral heels which was first noted approximately over a month ago. This has been treated with surgical debridement. On their initial visit to the wound healing center, 21 ,  the patient has noted that the wound has not been improving. The patient has had similar previous wounds in the past.      Pt is not on abx at time of initial visit. 3/22/21 - Wound VAC MWF continue to left heel. Aquacel Ag to right heel. Debrided toenails 1-5 in thickness and length. FU 1 week. IV Abx per Dr. Anni Joseph. 21 - Wound VAC MWF to left heel. Cultures obtained. FU 1 week after visit with Dr. Anni Joseph. PO Abx.  21 - DC wound VAC to left heel. Alginate to wound. FU 1 week  21 - Alginate and gentamicin ointment QOD. Partial WB to heel at 50% to foot with surgical shoe and walker. 5/3/21 - Alginate and gentamicin ointment QOD. MRI left heel    5/10/21 - Alginate and gentamicin ointment QOD. MRI left heel pending.       Wound/Ulcer Pain Timing/Severity: none  Quality of pain: N/A  Severity:  0 / 10   Modifying Factors: None  Associated Signs/Symptoms: none    Ulcer Identification:  Ulcer Type: arterial and diabetic  Contributing Factors: diabetes    Diabetic/Pressure/Non Pressure Ulcers onl y:  Ulcer: Diabetic ulcer, fat layer exposed    If patient has diabetic lower extremity wounds  Kong Classification of diabetic lower extremity wounds:    Grade Description   []  0 No open wound   []  1 Superficial ulcer involving the full skin thickness   [x]  2 Deep ulcer involves ligament, tendon, joint capsule, or fascia  No bone involvement or abscess presence   []  3 Deep Ulcer with abcess formation and/or osteomyelitis   []  4 Localized gangrene   []  5 Extensive gangrene of the foot     Wound: N/A        PAST MEDICAL HISTORY      Diagnosis Date    [facility]bral [facility]     Diabetes mellitus ([facility] [facility]     Type 2    Hemiparesis affecting left side as late effect of cerebrovascular accident ([facility] [address] [facility]     LEFT SIDE NON DOMINANT FOLLOWING STROKE    Hypertension      Past Surgical History:   Procedure Laterality Date    FINGER AMPUTATION      FOOT DEBRIDEMENT Left 2/16/2021    LEFT FOOT DEBRIDEMENT WITH BONE BIOPSY, WOUND VAC APPLICATION performed by Isabell Starkey DPM at [place] [address] ARTHROSCOPY      TONSILLECTOMY      TRANSESOPHAGEAL ECHOCARDIOGRAM N/A 2/22/2021    TRANSESOPHAGEAL ECHOCARDIOGRAM WITH BUBBLE STUDY performed by Tobi Ni MD at [address] [place] [address],[address] N/A 1/4/2021    EGD BIOPSY performed by El Flor DO at [place] [address] reviewed. No pertinent family history.   Social History     Tobacco Use    Smoking status: Former Smoker    Smokeless tobacco: Never Used   Substance Use Topics    Alcohol use: Yes     Frequency: 4 or more times a week     Drinks per session: 5 or 6     Binge frequency: Daily or almost daily     Comment: every day drinker for most of adult life    Drug use: No     Allergies   Allergen Reactions    Pcn [Penicillins]      Current Outpatient Medications on File Prior to Encounter   Medication Sig Dispense Refill    doxycycline hyclate (VIBRAMYCIN) 100 MG capsule Take 100 mg by mouth 2 times daily      B Complex-C-E-Zn (STRESS B/ZINC) TABS Take 1 tablet by mouth daily      acetaminophen (TYLENOL) 325 MG tablet Take 650 mg by mouth every 6 hours as needed for Pain      ferrous sulfate (IRON 325) 325 (65 Fe) MG tablet Take 325 mg by mouth daily (with breakfast)      collagenase 250 UNIT/GM ointment Apply topically daily Apply topically daily.       ascorbic acid (VITAMIN C) 500 MG tablet Take 500 mg by mouth daily      Cholecalciferol (VITAMIN D3) 1.25 MG (43859 UT) CAPS Take by mouth      zinc gluconate 50 MG tablet Take 50 mg by mouth daily      vitamin D (ERGOCALCIFEROL) 1.25 MG (87210 UT) CAPS capsule Take 1 capsule by mouth once a week 5 capsule 0    ipratropium-albuterol (DUONEB) 0.5-2.5 (3) MG/3ML SOLN nebulizer solution Inhale 3 mLs into the lungs every 4 hours 360 mL 0    pantoprazole (PROTONIX) 40 MG tablet Take 1 tablet by mouth every morning (before breakfast) 30 tablet 3    melatonin 3 MG TABS tablet Take 9 mg by mouth nightly as needed      aspirin 81 MG EC tablet Take 81 mg by mouth daily      hydroCHLOROthiazide (HYDRODIURIL) 25 MG tablet Take 25 mg by mouth daily      glimepiride (AMARYL) 2 MG tablet Take 1 tablet by mouth daily (with breakfast) 30 tablet 3    insulin lispro (HUMALOG) 100 UNIT/ML injection vial Inject 0-12 Units into the skin 3 times daily (with meals) 1 vial 3    insulin lispro (HUMALOG) 100 UNIT/ML injection vial Inject 0-6 Units into the skin nightly 1 vial 3    metFORMIN (GLUCOPHAGE) 500 MG tablet Take 1 tablet by mouth 2 times daily (with meals) 60 tablet 3    atorvastatin (LIPITOR) 40 MG tablet Take 1 tablet by mouth nightly 30 tablet 3    clopidogrel (PLAVIX) 75 MG tablet Take 1 tablet by mouth daily 30 tablet 3    vitamin B-1 100 MG tablet Take 1 tablet by mouth daily 30 tablet 3    bisacodyl (DULCOLAX) 10 MG suppository Place 10 mg rectally daily      Heparin Lock Flush (HEPARIN FLUSH, 1 UNITS/ML,) 1 UNIT/ML injection 1 Units as needed for Line Care      magnesium hydroxide (MILK OF MAGNESIA CONCENTRATE) 2400 MG/10ML SUSP Take 30 mLs by mouth daily as needed      Sodium Chloride Flush (SALINE FLUSH IV) Infuse intravenously 2 times daily Flush 1011   Post-Procedure Surface Area (cm^2) 0 cm^2 04/05/21 1011   Post-Procedure Volume (cm^3) 0 cm^3 04/05/21 1011   Wound Assessment Fibrin 04/01/21 1357   Drainage Amount None 04/01/21 1357   Drainage Description Yellow 03/22/21 0911   Odor None 04/01/21 1357   Adrianne-wound Assessment Maceration 04/01/21 1357   Number of days: 63     Incision 02/19/21 Groin Right (Active)   Number of days: 79       Procedure Note  Indications:  Based on my examination of this patient's wound(s)/ulcer(s) today, debridement is required to promote healing and evaluate the wound base. Performed by: Edwar Payan DPM    Consent obtained:  Yes    Time out taken:  Yes    Pain Control: Anesthetic  Anesthetic: 4% Lidocaine Liquid Topical     Debridement:Excisional Debridement    Using curette the wound(s)/ulcer(s) was/were sharply debrided down through and including the removal of subcutaneous tissue. Devitalized Tissue Debrided:  slough to stimulate bleeding to promote healing, post debridement good bleeding base and wound edges noted    Wound/Ulcer #: 2    Percent of Wound/Ulcer Debrided: 100%    Total Surface Area Debrided:  10.5 sq cm     Estimated Blood Loss:  None  Hemostasis Achieved:  by pressure    Procedural Pain:  2  / 10   Post Procedural Pain:  2 / 10     Response to treatment:  Well tolerated by patient. A culture was done. Plan:     Pt is a smoker   - Discussed relationship of smoking and negative affects on wound healing   - Emphasized importance of tobacco avoidace/cessation   - Script for nicotine patch given to patient   - Information regarding support groups for smoking cessation given    In my professional opinion and based off the information that is available at this time this patient has appropriate indication for HBO Therapy: Maybe    Treatment Note please see attached Discharge Instructions    Written patient dismissal instructions given to patient and signed by patient or POA. Electronically signed by Kisha Horne DPM on 5/10/2021 at 10:57 AM

## 2021-05-11 ENCOUNTER — HOSPITAL ENCOUNTER (OUTPATIENT)
Dept: MRI IMAGING | Age: 61
Discharge: HOME | End: 2021-05-13
Payer: MEDICARE

## 2021-05-11 DIAGNOSIS — L97.526 NON-PRESSURE CHRONIC ULCER OF OTHER PART OF LEFT FOOT WITH BONE INVOLVEMENT WITHOUT EVIDENCE OF NECROSIS (HCC): ICD-10-CM

## 2021-05-11 PROCEDURE — 73718 MRI LOWER EXTREMITY W/O DYE: CPT

## 2021-05-12 LAB
ANAEROBIC CULTURE: NORMAL
ORGANISM: ABNORMAL
WOUND/ABSCESS: ABNORMAL

## 2021-05-13 ENCOUNTER — HOSPITAL ENCOUNTER (OUTPATIENT)
Dept: WOUND CARE | Age: 61
Discharge: HOME | End: 2021-05-13
Payer: MEDICARE

## 2021-05-13 VITALS
SYSTOLIC BLOOD PRESSURE: 122 MMHG | HEART RATE: 88 BPM | DIASTOLIC BLOOD PRESSURE: 64 MMHG | TEMPERATURE: 97.8 F | RESPIRATION RATE: 18 BRPM

## 2021-05-13 DIAGNOSIS — E11.628 DIABETIC FOOT INFECTION (HCC): Primary | ICD-10-CM

## 2021-05-13 DIAGNOSIS — L08.9 DIABETIC FOOT INFECTION (HCC): Primary | ICD-10-CM

## 2021-05-13 PROCEDURE — 99212 OFFICE O/P EST SF 10 MIN: CPT

## 2021-05-13 RX ORDER — LIDOCAINE HYDROCHLORIDE 40 MG/ML
SOLUTION TOPICAL ONCE
Status: DISCONTINUED | OUTPATIENT
Start: 2021-05-13 | End: 2021-05-14 | Stop reason: HOSPADM

## 2021-05-13 RX ORDER — LIDOCAINE 50 MG/G
OINTMENT TOPICAL ONCE
Status: CANCELLED | OUTPATIENT
Start: 2021-05-13 | End: 2021-05-13

## 2021-05-13 RX ORDER — LINEZOLID 600 MG/1
600 TABLET, FILM COATED ORAL 2 TIMES DAILY
Qty: 28 TABLET | Refills: 0 | Status: SHIPPED | OUTPATIENT
Start: 2021-05-13 | End: 2021-05-27

## 2021-05-13 RX ORDER — BETAMETHASONE DIPROPIONATE 0.05 %
OINTMENT (GRAM) TOPICAL ONCE
Status: CANCELLED | OUTPATIENT
Start: 2021-05-13 | End: 2021-05-13

## 2021-05-13 RX ORDER — GENTAMICIN SULFATE 1 MG/G
OINTMENT TOPICAL ONCE
Status: CANCELLED | OUTPATIENT
Start: 2021-05-13 | End: 2021-05-13

## 2021-05-13 RX ORDER — LIDOCAINE 40 MG/G
CREAM TOPICAL ONCE
Status: CANCELLED | OUTPATIENT
Start: 2021-05-13 | End: 2021-05-13

## 2021-05-13 RX ORDER — LIDOCAINE HYDROCHLORIDE 20 MG/ML
JELLY TOPICAL ONCE
Status: CANCELLED | OUTPATIENT
Start: 2021-05-13 | End: 2021-05-13

## 2021-05-13 RX ORDER — CLOBETASOL PROPIONATE 0.5 MG/G
OINTMENT TOPICAL ONCE
Status: CANCELLED | OUTPATIENT
Start: 2021-05-13 | End: 2021-05-13

## 2021-05-13 RX ORDER — GINSENG 100 MG
CAPSULE ORAL ONCE
Status: CANCELLED | OUTPATIENT
Start: 2021-05-13 | End: 2021-05-13

## 2021-05-13 RX ORDER — BACITRACIN, NEOMYCIN, POLYMYXIN B 400; 3.5; 5 [USP'U]/G; MG/G; [USP'U]/G
OINTMENT TOPICAL ONCE
Status: CANCELLED | OUTPATIENT
Start: 2021-05-13 | End: 2021-05-13

## 2021-05-13 RX ORDER — LIDOCAINE HYDROCHLORIDE 40 MG/ML
SOLUTION TOPICAL ONCE
Status: CANCELLED | OUTPATIENT
Start: 2021-05-13 | End: 2021-05-13

## 2021-05-13 RX ORDER — BACITRACIN ZINC AND POLYMYXIN B SULFATE 500; 1000 [USP'U]/G; [USP'U]/G
OINTMENT TOPICAL ONCE
Status: CANCELLED | OUTPATIENT
Start: 2021-05-13 | End: 2021-05-13

## 2021-05-13 ASSESSMENT — PAIN DESCRIPTION - ORIENTATION: ORIENTATION: LEFT

## 2021-05-13 ASSESSMENT — PAIN DESCRIPTION - PAIN TYPE: TYPE: CHRONIC PAIN

## 2021-05-13 ASSESSMENT — PAIN DESCRIPTION - LOCATION: LOCATION: FOOT

## 2021-05-13 NOTE — PROGRESS NOTES
fascia and muscle. Total measurement is 4.5 cm x 6.0 cm x  0.5 cm in dimension. 2.  Incision and drainage, left foot abscess. 3.  Bone biopsy, left foot. 4.  Application of wound VAC negative pressure therapy. cx Enterococcus  Path Bone biopsy left heel: Medullary bone, negative for osteomyelitis.         Most Recent   Organism   Date Value Ref Range Status   05/10/2021 Staphylococcus aureus (A)  Final     PAST MEDICAL HISTORY      Diagnosis Date    C[facility]bral [facility] occlusion with [facility] [address] [facility]     Diabetes mellitus ([facility] [address]     Type 2    Hemiparesis affecting left side as late effect of cerebrovascular accident ([facility] [address]     LEFT SIDE NON DOMINANT FOLLOWING STROKE    Hypertension      Past Surgical History:   Procedure Laterality Date    FINGER AMPUTATION      FOOT DEBRIDEMENT Left 2/16/2021    LEFT FOOT DEBRIDEMENT WITH BONE BIOPSY, WOUND VAC APPLICATION performed by Kisha Horne DPM at [place] [address] ARTHROSCOPY      TONSILLECTOMY      TRANSESOPHAGEAL ECHOCARDIOGRAM N/A 2/22/2021    TRANSESOPHAGEAL ECHOCARDIOGRAM WITH BUBBLE STUDY performed by Leola Cedillo MD at [address] [place] [address] N/A 1/4/2021    EGD BIOPSY performed by Belen Franklin DO at [address]. No pertinent family history.   Social History     Tobacco Use    Smoking status: Former Smoker    Smokeless tobacco: Never Used   Substance Use Topics    Alcohol use: Yes     Frequency: 4 or more times a week     Drinks per session: 5 or 6     Binge frequency: Daily or almost daily     Comment: every day drinker for most of adult life    Drug use: No     Allergies   Allergen Reactions    Pcn [Penicillins]      Current Outpatient Medications on File Prior to Encounter   Medication Sig Dispense Refill    doxycycline hyclate (VIBRAMYCIN) 100 MG capsule Take 100 mg by mouth 2 times daily      B Complex-C-E-Zn (STRESS B/ZINC) TABS Take 1 tablet by mouth daily      acetaminophen  magnesium hydroxide (MILK OF MAGNESIA CONCENTRATE) 2400 MG/10ML SUSP Take 30 mLs by mouth daily as needed      glucose (GLUTOSE) 40 % GEL Take 37.5 mLs by mouth as needed (hypoglycemia) 45 g 1    glucagon, rDNA, 1 MG injection Inject 1 mg into the muscle as needed for Low blood sugar (Blood glucose less than 70 mg/dL and patient NOT ALERT or NPO and does not have IV access. ) 1 each 0     No current facility-administered medications on file prior to encounter.       REVIEW OF SYSTEMS See HPI  Objective:    /64   Pulse 88   Temp 97.8 °F (36.6 °C) (Temporal)   Resp 18   Wt Readings from Last 3 Encounters:   05/03/21 182 lb (82.6 kg)   04/19/21 182 lb (82.6 kg)   04/12/21 182 lb (82.6 kg)     PHYSICAL EXAM  CONSTITUTIONAL:   Awake, alert, cooperative    HEENT: AT/NC glasses  HEART: S1/S2  RS: CTAB ant  ABD: SOFT NT/ND distended  EXT:  EDEMA left  >right   SKIN:  Bruises ue Open wound Present post heel dec soze   Rue picc no swelling     Wound Care Documentation:  Wound 12/26/20 Arm Left (Active)   Number of days: 123       Wound 02/15/21 Heel Left (Active)   Number of days: 73       Wound 02/15/21 Heel Right (Active)   Number of days: 73       Wound 03/08/21 Heel Left #1 left heel aquired 12/1/20 (Active)   Wound Image   03/22/21 0911   Dressing Status New dressing applied 04/19/21 1220   Wound Cleansed Cleansed with saline 04/19/21 1220   Dressing/Treatment Alginate;ABD 04/19/21 1220   Offloading for Diabetic Foot Ulcers Post op shoe 04/19/21 1220   Wound Length (cm) 4.2 cm 04/29/21 1302   Wound Width (cm) 3.2 cm 04/29/21 1302   Wound Depth (cm) 0.5 cm 04/29/21 1302   Wound Surface Area (cm^2) 13.44 cm^2 04/29/21 1302   Change in Wound Size % (l*w) -11.17 04/29/21 1302   Wound Volume (cm^3) 6.72 cm^3 04/29/21 1302   Wound Healing % 21 04/29/21 1302   Post-Procedure Length (cm) 4.5 cm 04/19/21 1132   Post-Procedure Width (cm) 2.9 cm 04/19/21 1132   Post-Procedure Depth (cm) 0.5 cm 04/19/21 1132 Post-Procedure Surface Area (cm^2) 13.05 cm^2 04/19/21 1132   Post-Procedure Volume (cm^3) 6.52 cm^3 04/19/21 1132   Wound Assessment Pale granulation tissue;Slough 04/29/21 1304   Drainage Amount Moderate 04/29/21 1304   Drainage Description Serosanguinous; Yellow 04/29/21 1304   Odor None 04/29/21 1304   Adrianne-wound Assessment Maceration 04/29/21 1304   Number of days: 52       Wound 03/08/21 Heel Right #2 rt heel aquired 12/1/20 (Active)   Wound Image   04/05/21 0929   Dressing Status New dressing applied 03/18/21 1352   Wound Cleansed Cleansed with saline 04/01/21 1536   Dressing/Treatment Alginate;Collagen;Silicone border 84/65/36 1536   Offloading for Diabetic Foot Ulcers Post op shoe 04/01/21 1536   Wound Length (cm) 0 cm 04/05/21 0929   Wound Width (cm) 0 cm 04/05/21 0929   Wound Depth (cm) 0 cm 04/05/21 0929   Wound Surface Area (cm^2) 0 cm^2 04/05/21 0929   Change in Wound Size % (l*w) 100 04/05/21 0929   Wound Volume (cm^3) 0 cm^3 04/05/21 0929   Wound Healing % 100 04/05/21 0929   Post-Procedure Length (cm) 0 cm 04/05/21 1011   Post-Procedure Width (cm) 0 cm 04/05/21 1011   Post-Procedure Depth (cm) 0 cm 04/05/21 1011   Post-Procedure Surface Area (cm^2) 0 cm^2 04/05/21 1011   Post-Procedure Volume (cm^3) 0 cm^3 04/05/21 1011   Wound Assessment Fibrin 04/01/21 1357   Drainage Amount None 04/01/21 1357   Drainage Description Yellow 03/22/21 0911   Odor None 04/01/21 1357   Adrianne-wound Assessment Maceration 04/01/21 1357   Number of days: 52         Assessment:   MSSA bacteremia follow off atbx  BILATERAL HEEL PRESSURE ULCERS   NECROTIC WOUND, OSTEOMYELITIS, Cellulitis left foot with palpable bone   S/p LEFT FOOT DEBRIDEMENT WITH BONE BIOPSY, WOUND VAC APPLICATION  Bone biopsy left heel: Medullary bone, negative for osteomyelitis.   S/p angio    UTI  - proteus s/p rx with cefepime  H/o vitamin D deficiency on suppleemnts    2/14 wound cx Mixed Gram positive organisms   Proteus species   TTE Summary No vegetations seen otherwise. Plan:     Cont  doxycyline 100mg po q12 as suppression    A wound culture SHOWED MRSA  MRI SHOWED ACUTE OM/MYOSITIS/CELLULITS  ORDER LINEZOLID 600MG PO Q12  FURTHER PLANS DEPEND ON PODIATRY   LABS 5/6BUN 27 CR0.8  WBC6.8  LVF38  allergic to pcn   F/u 2 weeks     Orders Placed This Encounter   Procedures    Initiate Outpatient Wound Care Protocol     Orders Placed This Encounter   Medications    lidocaine (XYLOCAINE) 4 % external solution    linezolid (ZYVOX) 600 MG tablet     Sig: Take 1 tablet by mouth 2 times daily for 14 days     Dispense:  28 tablet     Refill:  0       Plan:   Treatment Note please see attached Discharge Instructions    Written patient dismissal instructions given to patient and signed by patient or POA. Discharge Instructions          Discharge condition: Stable     Assessment of pain at discharge: moderate     Anesthetic used: 4% liquid lidocaine solution      Discharge to: ECF     Left via:ambulance     Accompanied by: self     [facility]: [email or web address]     Dressing Orders:  Cleanse left heel ulcer with normal saline solution apply plain alginate and dry dressing and change every other day or as needed for drainage.  .     RIGHT HEEL -HEALED- may leave open to air. Continue offloading boots as ordered.     Treatment Orders:    Continue antibiotics per Dr Ry Young for suppression. Marlene Torress prevalon boots or heel ayo while in bed.       May be partial weight bearing up to 50% on left with surgical shoe.     Tissue culture obtained at [facility] 4/29/21- if negative consider graft. We will schedule and MRI of the left. ( done and reviewed). Consider HBO?      [facility] followup visit:________Dr Toro 2 weeks_______________________ 1 week Dr Harrell____________________________  (Please note your next appointment above and if you are unable to keep, kindly give a 24 hour notice.  Thank you.)     Physician signature:__________________________        If you

## 2021-05-17 ENCOUNTER — HOSPITAL ENCOUNTER (OUTPATIENT)
Dept: WOUND CARE | Age: 61
Discharge: HOME | End: 2021-05-17
Payer: MEDICARE

## 2021-05-17 VITALS
HEIGHT: 71 IN | DIASTOLIC BLOOD PRESSURE: 68 MMHG | TEMPERATURE: 97.5 F | HEART RATE: 80 BPM | BODY MASS INDEX: 25.48 KG/M2 | SYSTOLIC BLOOD PRESSURE: 110 MMHG | WEIGHT: 182 LBS | RESPIRATION RATE: 20 BRPM

## 2021-05-17 DIAGNOSIS — E11.628 DIABETIC FOOT INFECTION (HCC): Primary | ICD-10-CM

## 2021-05-17 DIAGNOSIS — L08.9 DIABETIC FOOT INFECTION (HCC): Primary | ICD-10-CM

## 2021-05-17 PROCEDURE — 6370000000 HC RX 637 (ALT 250 FOR IP): Performed by: PODIATRIST

## 2021-05-17 PROCEDURE — 11042 DBRDMT SUBQ TIS 1ST 20SQCM/<: CPT

## 2021-05-17 RX ORDER — BACITRACIN ZINC AND POLYMYXIN B SULFATE 500; 1000 [USP'U]/G; [USP'U]/G
OINTMENT TOPICAL ONCE
Status: CANCELLED | OUTPATIENT
Start: 2021-05-17 | End: 2021-05-17

## 2021-05-17 RX ORDER — LIDOCAINE HYDROCHLORIDE 40 MG/ML
SOLUTION TOPICAL ONCE
Status: COMPLETED | OUTPATIENT
Start: 2021-05-17 | End: 2021-05-17

## 2021-05-17 RX ORDER — GINSENG 100 MG
CAPSULE ORAL ONCE
Status: CANCELLED | OUTPATIENT
Start: 2021-05-17 | End: 2021-05-17

## 2021-05-17 RX ORDER — LIDOCAINE 40 MG/G
CREAM TOPICAL ONCE
Status: CANCELLED | OUTPATIENT
Start: 2021-05-17 | End: 2021-05-17

## 2021-05-17 RX ORDER — LIDOCAINE HYDROCHLORIDE 20 MG/ML
JELLY TOPICAL ONCE
Status: CANCELLED | OUTPATIENT
Start: 2021-05-17 | End: 2021-05-17

## 2021-05-17 RX ORDER — CLOBETASOL PROPIONATE 0.5 MG/G
OINTMENT TOPICAL ONCE
Status: CANCELLED | OUTPATIENT
Start: 2021-05-17 | End: 2021-05-17

## 2021-05-17 RX ORDER — LIDOCAINE 50 MG/G
OINTMENT TOPICAL ONCE
Status: CANCELLED | OUTPATIENT
Start: 2021-05-17 | End: 2021-05-17

## 2021-05-17 RX ORDER — GABAPENTIN 100 MG/1
100 CAPSULE ORAL 2 TIMES DAILY
COMMUNITY
End: 2022-06-08 | Stop reason: SDUPTHER

## 2021-05-17 RX ORDER — BACITRACIN, NEOMYCIN, POLYMYXIN B 400; 3.5; 5 [USP'U]/G; MG/G; [USP'U]/G
OINTMENT TOPICAL ONCE
Status: CANCELLED | OUTPATIENT
Start: 2021-05-17 | End: 2021-05-17

## 2021-05-17 RX ORDER — BETAMETHASONE DIPROPIONATE 0.05 %
OINTMENT (GRAM) TOPICAL ONCE
Status: CANCELLED | OUTPATIENT
Start: 2021-05-17 | End: 2021-05-17

## 2021-05-17 RX ORDER — GENTAMICIN SULFATE 1 MG/G
OINTMENT TOPICAL ONCE
Status: CANCELLED | OUTPATIENT
Start: 2021-05-17 | End: 2021-05-17

## 2021-05-17 RX ORDER — LIDOCAINE HYDROCHLORIDE 40 MG/ML
SOLUTION TOPICAL ONCE
Status: CANCELLED | OUTPATIENT
Start: 2021-05-17 | End: 2021-05-17

## 2021-05-17 RX ADMIN — LIDOCAINE HYDROCHLORIDE: 40 SOLUTION TOPICAL at 10:30

## 2021-05-17 ASSESSMENT — PAIN SCALES - GENERAL: PAINLEVEL_OUTOF10: 5

## 2021-05-17 ASSESSMENT — PAIN DESCRIPTION - DESCRIPTORS: DESCRIPTORS: ACHING

## 2021-05-17 ASSESSMENT — PAIN DESCRIPTION - PAIN TYPE: TYPE: CHRONIC PAIN

## 2021-05-17 ASSESSMENT — PAIN DESCRIPTION - ORIENTATION: ORIENTATION: LEFT

## 2021-05-17 NOTE — PROGRESS NOTES
patient has diabetic lower extremity wounds  Kong Classification of diabetic lower extremity wounds:    Grade Description   []  0 No open wound   []  1 Superficial ulcer involving the full skin thickness   [x]  2 Deep ulcer involves ligament, tendon, joint capsule, or fascia  No bone involvement or abscess presence   []  3 Deep Ulcer with abcess formation and/or osteomyelitis   []  4 Localized gangrene   []  5 Extensive gangrene of the foot     Wound: N/A        PAST MEDICAL HISTORY      Diagnosis Date    Cerebral artery occlusion with cerebral infarction ([facility]     Diabetes mellitus ([facility]     Type 2    Hemiparesis affecting left side as late effect of cerebrovascular accident ([facility]     LEFT SIDE NON DOMINANT FOLLOWING STROKE    Hypertension      Past Surgical History:   Procedure Laterality Date    FINGER AMPUTATION      FOOT DEBRIDEMENT Left 2/16/2021    LEFT FOOT DEBRIDEMENT WITH BONE BIOPSY, WOUND VAC APPLICATION performed by Cristina Vann DPM at [place] [address] ARTHROSCOPY      TONSILLECTOMY      TRANSESOPHAGEAL ECHOCARDIOGRAM N/A 2/22/2021    TRANSESOPHAGEAL ECHOCARDIOGRAM WITH BUBBLE STUDY performed by Fredy Escamilla MD at [address] [place] [address],[address] N/A 1/4/2021    EGD BIOPSY performed by Nay Fox DO at [place] [address] reviewed. No pertinent family history. Social History     Tobacco Use    Smoking status: Former Smoker    Smokeless tobacco: Never Used   Vaping Use    Vaping Use: Never used   Substance Use Topics    Alcohol use: Yes     Comment: every day drinker for most of adult life    Drug use: No     Allergies   Allergen Reactions    Pcn [Penicillins]      Current Outpatient Medications on File Prior to Encounter   Medication Sig Dispense Refill    gabapentin (NEURONTIN) 100 MG capsule Take 100 mg by mouth 2 times daily.       linezolid (ZYVOX) 600 MG tablet Take 1 tablet by mouth 2 times daily for 14 days 28 tablet 0    doxycycline hyclate (VIBRAMYCIN) 100 MG capsule Take 100 mg by mouth 2 times daily      B Complex-C-E-Zn (STRESS B/ZINC) TABS Take 1 tablet by mouth daily      acetaminophen (TYLENOL) 325 MG tablet Take 650 mg by mouth every 6 hours as needed for Pain      bisacodyl (DULCOLAX) 10 MG suppository Place 10 mg rectally daily      ferrous sulfate (IRON 325) 325 (65 Fe) MG tablet Take 325 mg by mouth daily (with breakfast)      magnesium hydroxide (MILK OF MAGNESIA CONCENTRATE) 2400 MG/10ML SUSP Take 30 mLs by mouth daily as needed      collagenase 250 UNIT/GM ointment Apply topically daily Apply topically daily.       ascorbic acid (VITAMIN C) 500 MG tablet Take 500 mg by mouth daily      Cholecalciferol (VITAMIN D3) 1.25 MG (82227 UT) CAPS Take by mouth      zinc gluconate 50 MG tablet Take 50 mg by mouth daily      vitamin D (ERGOCALCIFEROL) 1.25 MG (44905 UT) CAPS capsule Take 1 capsule by mouth once a week 5 capsule 0    ipratropium-albuterol (DUONEB) 0.5-2.5 (3) MG/3ML SOLN nebulizer solution Inhale 3 mLs into the lungs every 4 hours 360 mL 0    pantoprazole (PROTONIX) 40 MG tablet Take 1 tablet by mouth every morning (before breakfast) 30 tablet 3    melatonin 3 MG TABS tablet Take 9 mg by mouth nightly as needed      aspirin 81 MG EC tablet Take 81 mg by mouth daily      hydroCHLOROthiazide (HYDRODIURIL) 25 MG tablet Take 25 mg by mouth daily      glimepiride (AMARYL) 2 MG tablet Take 1 tablet by mouth daily (with breakfast) 30 tablet 3    insulin lispro (HUMALOG) 100 UNIT/ML injection vial Inject 0-12 Units into the skin 3 times daily (with meals) 1 vial 3    insulin lispro (HUMALOG) 100 UNIT/ML injection vial Inject 0-6 Units into the skin nightly 1 vial 3    metFORMIN (GLUCOPHAGE) 500 MG tablet Take 1 tablet by mouth 2 times daily (with meals) 60 tablet 3    atorvastatin (LIPITOR) 40 MG tablet Take 1 tablet by mouth nightly 30 tablet 3    clopidogrel (PLAVIX) 75 MG tablet Take 1 tablet by mouth daily 30 Measurements:  Are located in the [place]  Documentation Flow Sheet  Post Debridement Measurements:  Wound/Ulcer Descriptions are Pre Debridement except measurements:     Wound 12/26/20 Arm Left (Active)   Number of days: 141       Wound 02/15/21 Heel Left (Active)   Number of days: 91       Wound 02/15/21 Heel Right (Active)   Number of days: 91       Wound 03/08/21 Heel Left #1 left heel aquired 12/1/20 (Active)   Wound Image   05/03/21 1000   Dressing Status New dressing applied 05/13/21 1518   Wound Cleansed Cleansed with saline 05/13/21 1518   Dressing/Treatment Alginate;Dry dressing;Roll gauze 05/13/21 1518   Offloading for Diabetic Foot Ulcers Post op shoe 05/13/21 1518   Wound Length (cm) 4.1 cm 05/17/21 1030   Wound Width (cm) 3.5 cm 05/17/21 1030   Wound Depth (cm) 0.6 cm 05/17/21 1030   Wound Surface Area (cm^2) 14.35 cm^2 05/17/21 1030   Change in Wound Size % (l*w) -18.69 05/17/21 1030   Wound Volume (cm^3) 8.61 cm^3 05/17/21 1030   Wound Healing % -2 05/17/21 1030   Post-Procedure Length (cm) 4.3 cm 05/17/21 1051   Post-Procedure Width (cm) 2.5 cm 05/17/21 1051   Post-Procedure Depth (cm) 0.6 cm 05/17/21 1051   Post-Procedure Surface Area (cm^2) 10.75 cm^2 05/17/21 1051   Post-Procedure Volume (cm^3) 6.45 cm^3 05/17/21 1051   Wound Assessment Slough;Pale granulation tissue 05/17/21 1030   Drainage Amount Moderate 05/17/21 1030   Drainage Description Yellow;Serosanguinous 05/17/21 1030   Odor None 05/17/21 1030   Adrianne-wound Assessment Hyperkeratosis (callous); Maceration 05/17/21 1030   Number of days: 70       Wound 03/08/21 Heel Right #2 rt heel aquired 12/1/20 (Active)   Wound Image   04/05/21 0929   Dressing Status New dressing applied 03/18/21 1352   Wound Cleansed Cleansed with saline 04/01/21 1536   Dressing/Treatment Alginate;Collagen;Silicone border 04/74/08 1536   Offloading for Diabetic Foot Ulcers Post op shoe 04/01/21 1536   Wound Length (cm) 0 cm 04/05/21 0929   Wound Width (cm) 0 cm 04/05/21 0929   Wound Depth (cm) 0 cm 04/05/21 0929   Wound Surface Area (cm^2) 0 cm^2 04/05/21 0929   Change in Wound Size % (l*w) 100 04/05/21 0929   Wound Volume (cm^3) 0 cm^3 04/05/21 0929   Wound Healing % 100 04/05/21 0929   Post-Procedure Length (cm) 0 cm 04/05/21 1011   Post-Procedure Width (cm) 0 cm 04/05/21 1011   Post-Procedure Depth (cm) 0 cm 04/05/21 1011   Post-Procedure Surface Area (cm^2) 0 cm^2 04/05/21 1011   Post-Procedure Volume (cm^3) 0 cm^3 04/05/21 1011   Wound Assessment Fibrin 04/01/21 1357   Drainage Amount None 04/01/21 1357   Drainage Description Yellow 03/22/21 0911   Odor None 04/01/21 1357   Adrianne-wound Assessment Maceration 04/01/21 1357   Number of days: 70     Incision 02/19/21 Groin Right (Active)   Number of days: 86       Procedure Note  Indications:  Based on my examination of this patient's wound(s)/ulcer(s) today, debridement is required to promote healing and evaluate the wound base. Performed by: Srinivas Haskins DPM    Consent obtained:  Yes    Time out taken:  Yes    Pain Control: Anesthetic  Anesthetic: 4% Lidocaine Liquid Topical     Debridement:Excisional Debridement    Using curette the wound(s)/ulcer(s) was/were sharply debrided down through and including the removal of subcutaneous tissue. Devitalized Tissue Debrided:  slough to stimulate bleeding to promote healing, post debridement good bleeding base and wound edges noted    Wound/Ulcer #: 2    Percent of Wound/Ulcer Debrided: 100%    Total Surface Area Debrided:  10.75 sq cm     Estimated Blood Loss:  None  Hemostasis Achieved:  by pressure    Procedural Pain:  2  / 10   Post Procedural Pain:  2 / 10     Response to treatment:  Well tolerated by patient. A culture was done.       Plan:     Pt is a smoker   - Discussed relationship of smoking and negative affects on wound healing   - Emphasized importance of tobacco avoidace/cessation   - Script for nicotine patch given to patient   - Information regarding support groups for smoking cessation given    In my professional opinion and based off the information that is available at this time this patient has appropriate indication for HBO Therapy: Maybe    Treatment Note please see attached Discharge Instructions    Written patient dismissal instructions given to patient and signed by patient or POA.            Electronically signed by Zamzam Elias DPM on 5/17/2021 at 10:59 AM

## 2021-05-27 ENCOUNTER — HOSPITAL ENCOUNTER (OUTPATIENT)
Dept: WOUND CARE | Age: 61
Discharge: HOME | End: 2021-05-27
Payer: MEDICARE

## 2021-06-03 ENCOUNTER — HOSPITAL ENCOUNTER (OUTPATIENT)
Dept: WOUND CARE | Age: 61
Discharge: HOME | End: 2021-06-03
Payer: COMMERCIAL

## 2021-06-03 VITALS
HEIGHT: 71 IN | WEIGHT: 182 LBS | DIASTOLIC BLOOD PRESSURE: 64 MMHG | BODY MASS INDEX: 25.48 KG/M2 | RESPIRATION RATE: 18 BRPM | TEMPERATURE: 98.2 F | SYSTOLIC BLOOD PRESSURE: 112 MMHG | HEART RATE: 86 BPM

## 2021-06-03 DIAGNOSIS — E11.628 DIABETIC FOOT INFECTION (HCC): Primary | ICD-10-CM

## 2021-06-03 DIAGNOSIS — L08.9 DIABETIC FOOT INFECTION (HCC): Primary | ICD-10-CM

## 2021-06-03 PROCEDURE — 87075 CULTR BACTERIA EXCEPT BLOOD: CPT

## 2021-06-03 PROCEDURE — 87186 SC STD MICRODIL/AGAR DIL: CPT

## 2021-06-03 PROCEDURE — 99213 OFFICE O/P EST LOW 20 MIN: CPT

## 2021-06-03 PROCEDURE — 87070 CULTURE OTHR SPECIMN AEROBIC: CPT

## 2021-06-03 PROCEDURE — 87077 CULTURE AEROBIC IDENTIFY: CPT

## 2021-06-03 RX ORDER — BACITRACIN, NEOMYCIN, POLYMYXIN B 400; 3.5; 5 [USP'U]/G; MG/G; [USP'U]/G
OINTMENT TOPICAL ONCE
Status: CANCELLED | OUTPATIENT
Start: 2021-06-03 | End: 2021-06-03

## 2021-06-03 RX ORDER — LIDOCAINE 50 MG/G
OINTMENT TOPICAL ONCE
Status: CANCELLED | OUTPATIENT
Start: 2021-06-03 | End: 2021-06-03

## 2021-06-03 RX ORDER — LIDOCAINE HYDROCHLORIDE 20 MG/ML
JELLY TOPICAL ONCE
Status: CANCELLED | OUTPATIENT
Start: 2021-06-03 | End: 2021-06-03

## 2021-06-03 RX ORDER — GINSENG 100 MG
CAPSULE ORAL ONCE
Status: CANCELLED | OUTPATIENT
Start: 2021-06-03 | End: 2021-06-03

## 2021-06-03 RX ORDER — CLOBETASOL PROPIONATE 0.5 MG/G
OINTMENT TOPICAL ONCE
Status: CANCELLED | OUTPATIENT
Start: 2021-06-03 | End: 2021-06-03

## 2021-06-03 RX ORDER — LIDOCAINE HYDROCHLORIDE 40 MG/ML
SOLUTION TOPICAL ONCE
Status: CANCELLED | OUTPATIENT
Start: 2021-06-03 | End: 2021-06-03

## 2021-06-03 RX ORDER — LIDOCAINE 40 MG/G
CREAM TOPICAL ONCE
Status: CANCELLED | OUTPATIENT
Start: 2021-06-03 | End: 2021-06-03

## 2021-06-03 RX ORDER — BACITRACIN ZINC AND POLYMYXIN B SULFATE 500; 1000 [USP'U]/G; [USP'U]/G
OINTMENT TOPICAL ONCE
Status: CANCELLED | OUTPATIENT
Start: 2021-06-03 | End: 2021-06-03

## 2021-06-03 RX ORDER — GENTAMICIN SULFATE 1 MG/G
OINTMENT TOPICAL ONCE
Status: CANCELLED | OUTPATIENT
Start: 2021-06-03 | End: 2021-06-03

## 2021-06-03 RX ORDER — BETAMETHASONE DIPROPIONATE 0.05 %
OINTMENT (GRAM) TOPICAL ONCE
Status: CANCELLED | OUTPATIENT
Start: 2021-06-03 | End: 2021-06-03

## 2021-06-03 ASSESSMENT — PAIN DESCRIPTION - DESCRIPTORS: DESCRIPTORS: ACHING

## 2021-06-03 ASSESSMENT — PAIN SCALES - GENERAL: PAINLEVEL_OUTOF10: 5

## 2021-06-03 ASSESSMENT — PAIN DESCRIPTION - LOCATION: LOCATION: FOOT

## 2021-06-03 ASSESSMENT — PAIN DESCRIPTION - PAIN TYPE: TYPE: CHRONIC PAIN

## 2021-06-03 ASSESSMENT — PAIN DESCRIPTION - ORIENTATION: ORIENTATION: LEFT

## 2021-06-03 NOTE — PROGRESS NOTES
[facility] Followup Visit Note  Referring Physician : Bismark Alexis MD  [id number] [facility] RECORD NUMBER:  [id number]  AGE: 63 y.o. GENDER: male  : 1960  EPISODE DATE:  6/3/2021  Subjective:     Chief Complaint   Patient presents with    Wound Check     left heel      HISTORY of PRESENT ILLNESS CHIQUITA Camargo is a 63 y.o. male who presents today in regards to follow up evaluation and treatment of wound/ulcer. That patient's past medical, family and social hx were reviewed and changes were made if present. History of Wound Context:  Pt is known to ID s/p D/c from [facility] on  2021  Came in with 1. Left heel ulceration and necrosis of muscle. 2.  Peripheral arterial disease. 3. Acute osteomyelitis, left foot. 4. Cellulitis, left foot with abscess. Current visit:  6/3/2021  A wound culture SHOWED MRSA  MRI SHOWED ACUTE OM/MYOSITIS/CELLULITS  FINISHED  LINEZOLID 600MG PO Q12  ON DOXY   RECX   NO F/C/N/V/D/  50% WEIGHT BEARING    2021  IN WC NAD NO C/O  NO PAIN LE   MRI NOTED   ON DOXY     2021  Still at ecf on doxycycline he has no isues with it  Left heel has some pain  He is asking about more weight bearing     Sp vancomycin (VANCOCIN) 1,000 mg  Q12H can stop today/2021  Here for f/u left post heel ulcer   Has wound vacc   Currently off has bone palpable  periwound inatct   has left >right ble swelling   No calf pain   Has no c/o  Rue picc without swelling or pain  Tolerating doxycyline informed side effects of photosensitivity     Bilateral iliac angiogram, left femoral  angiography, nitroglycerin infusion left common femoral artery 1000 mcg  x2 boluses, right common femoral artery to left popliteal artery  catheterization, PTA of the left SFA and popliteal artery with 4 x 80  RapidCross balloon, 5 x 300 Saber balloon, 5 x 220 Powerflex balloon, 6  x 100 Powerflex balloon and 6 x 250 IN. PACT Admiral balloon, completion  left femoral doxycycline hyclate (VIBRAMYCIN) 100 MG capsule Take 100 mg by mouth 2 times daily      B Complex-C-E-Zn (STRESS B/ZINC) TABS Take 1 tablet by mouth daily      acetaminophen (TYLENOL) 325 MG tablet Take 650 mg by mouth every 6 hours as needed for Pain      bisacodyl (DULCOLAX) 10 MG suppository Place 10 mg rectally daily      ferrous sulfate (IRON 325) 325 (65 Fe) MG tablet Take 325 mg by mouth daily (with breakfast)      Heparin Lock Flush (HEPARIN FLUSH, 1 UNITS/ML,) 1 UNIT/ML injection 1 Units as needed for Line Care      magnesium hydroxide (MILK OF MAGNESIA CONCENTRATE) 2400 MG/10ML SUSP Take 30 mLs by mouth daily as needed      Sodium Chloride Flush (SALINE FLUSH IV) Infuse intravenously 2 times daily Flush picc      collagenase 250 UNIT/GM ointment Apply topically daily Apply topically daily.  ascorbic acid (VITAMIN C) 500 MG tablet Take 500 mg by mouth daily      Cholecalciferol (VITAMIN D3) 1.25 MG (46134 UT) CAPS Take by mouth      zinc gluconate 50 MG tablet Take 50 mg by mouth daily      glucose (GLUTOSE) 40 % GEL Take 37.5 mLs by mouth as needed (hypoglycemia) 45 g 1    glucagon, rDNA, 1 MG injection Inject 1 mg into the muscle as needed for Low blood sugar (Blood glucose less than 70 mg/dL and patient NOT ALERT or NPO and does not have IV access. ) 1 each 0    vitamin D (ERGOCALCIFEROL) 1.25 MG (88140 UT) CAPS capsule Take 1 capsule by mouth once a week 5 capsule 0    ipratropium-albuterol (DUONEB) 0.5-2.5 (3) MG/3ML SOLN nebulizer solution Inhale 3 mLs into the lungs every 4 hours 360 mL 0    pantoprazole (PROTONIX) 40 MG tablet Take 1 tablet by mouth every morning (before breakfast) 30 tablet 3    melatonin 3 MG TABS tablet Take 9 mg by mouth nightly as needed      aspirin 81 MG EC tablet Take 81 mg by mouth daily      hydroCHLOROthiazide (HYDRODIURIL) 25 MG tablet Take 25 mg by mouth daily      glimepiride (AMARYL) 2 MG tablet Take 1 tablet by mouth daily (with breakfast) 30 tablet 3    insulin lispro (HUMALOG) 100 UNIT/ML injection vial Inject 0-12 Units into the skin 3 times daily (with meals) 1 vial 3    insulin lispro (HUMALOG) 100 UNIT/ML injection vial Inject 0-6 Units into the skin nightly 1 vial 3    metFORMIN (GLUCOPHAGE) 500 MG tablet Take 1 tablet by mouth 2 times daily (with meals) 60 tablet 3    atorvastatin (LIPITOR) 40 MG tablet Take 1 tablet by mouth nightly 30 tablet 3    clopidogrel (PLAVIX) 75 MG tablet Take 1 tablet by mouth daily 30 tablet 3    vitamin B-1 100 MG tablet Take 1 tablet by mouth daily 30 tablet 3     No current facility-administered medications on file prior to encounter.      REVIEW OF SYSTEMS See HPI  Objective:    /64   Pulse 86   Temp 98.2 °F (36.8 °C) (Temporal)   Resp 18   Ht 5' 11\" (1.803 m)   Wt 182 lb (82.6 kg)   BMI 25.38 kg/m²   Wt Readings from Last 3 Encounters:   06/03/21 182 lb (82.6 kg)   05/17/21 182 lb (82.6 kg)   05/03/21 182 lb (82.6 kg)     PHYSICAL EXAM  CONSTITUTIONAL:   Awake, alert, cooperative    HEENT: AT/NC glasses  HEART: S1/S2  RS: CTAB ant  ABD: SOFT NT/ND distended  EXT:  EDEMA left  >right   SKIN:  Bruises ue Open wound Present post heel dec brendan Sanchez picc no swelling     Wound Care Documentation:  Wound 12/26/20 Arm Left (Active)   Number of days: 123       Wound 02/15/21 Heel Left (Active)   Number of days: 73       Wound 02/15/21 Heel Right (Active)   Number of days: 73       Wound 03/08/21 Heel Left #1 left heel aquired 12/1/20 (Active)   Wound Image   03/22/21 0911   Dressing Status New dressing applied 04/19/21 1220   Wound Cleansed Cleansed with saline 04/19/21 1220   Dressing/Treatment Alginate;ABD 04/19/21 1220   Offloading for Diabetic Foot Ulcers Post op shoe 04/19/21 1220   Wound Length (cm) 4.2 cm 04/29/21 1302   Wound Width (cm) 3.2 cm 04/29/21 1302   Wound Depth (cm) 0.5 cm 04/29/21 1302   Wound Surface Area (cm^2) 13.44 cm^2 04/29/21 1302   Change in Wound Size % (l*w) -11.17 04/29/21 1302   Wound Volume (cm^3) 6.72 cm^3 04/29/21 1302   Wound Healing % 21 04/29/21 1302   Post-Procedure Length (cm) 4.5 cm 04/19/21 1132   Post-Procedure Width (cm) 2.9 cm 04/19/21 1132   Post-Procedure Depth (cm) 0.5 cm 04/19/21 1132   Post-Procedure Surface Area (cm^2) 13.05 cm^2 04/19/21 1132   Post-Procedure Volume (cm^3) 6.52 cm^3 04/19/21 1132   Wound Assessment Pale granulation tissue;Slough 04/29/21 1304   Drainage Amount Moderate 04/29/21 1304   Drainage Description Serosanguinous; Yellow 04/29/21 1304   Odor None 04/29/21 1304   Adrianne-wound Assessment Maceration 04/29/21 1304   Number of days: 52       Wound 03/08/21 Heel Right #2 rt heel aquired 12/1/20 (Active)   Wound Image   04/05/21 0929   Dressing Status New dressing applied 03/18/21 1352   Wound Cleansed Cleansed with saline 04/01/21 1536   Dressing/Treatment Alginate;Collagen;Silicone border 89/14/59 1536   Offloading for Diabetic Foot Ulcers Post op shoe 04/01/21 1536   Wound Length (cm) 0 cm 04/05/21 0929   Wound Width (cm) 0 cm 04/05/21 0929   Wound Depth (cm) 0 cm 04/05/21 0929   Wound Surface Area (cm^2) 0 cm^2 04/05/21 0929   Change in Wound Size % (l*w) 100 04/05/21 0929   Wound Volume (cm^3) 0 cm^3 04/05/21 0929   Wound Healing % 100 04/05/21 0929   Post-Procedure Length (cm) 0 cm 04/05/21 1011   Post-Procedure Width (cm) 0 cm 04/05/21 1011   Post-Procedure Depth (cm) 0 cm 04/05/21 1011   Post-Procedure Surface Area (cm^2) 0 cm^2 04/05/21 1011   Post-Procedure Volume (cm^3) 0 cm^3 04/05/21 1011   Wound Assessment Fibrin 04/01/21 1357   Drainage Amount None 04/01/21 1357   Drainage Description Yellow 03/22/21 0911   Odor None 04/01/21 1357   Adrianen-wound Assessment Maceration 04/01/21 1357   Number of days: 52         Assessment:   MSSA bacteremia follow off atbx  BILATERAL HEEL PRESSURE ULCERS   NECROTIC WOUND, OSTEOMYELITIS, Cellulitis left foot with palpable bone   S/p LEFT FOOT DEBRIDEMENT WITH BONE BIOPSY, WOUND VAC APPLICATION  Bone biopsy left heel: Medullary bone, negative for osteomyelitis. S/p angio    UTI  - proteus s/p rx with cefepime  H/o vitamin D deficiency on suppleemnts    2/14 wound cx Mixed Gram positive organisms   Proteus species   TTE Summary No vegetations seen otherwise. Plan:   CHECK CX MAY NEED ADJUSTED   Cont  doxycyline 100mg po q12 as suppression         allergic to pcn   F/u 2 weeks     Orders Placed This Encounter   Procedures    Initiate Outpatient Wound Care Protocol    Culture, Wound    Culture, Anaerobic     No orders of the defined types were placed in this encounter. Plan:   Treatment Note please see attached Discharge Instructions    Written patient dismissal instructions given to patient and signed by patient or POA. Discharge Instructions        Discharge condition: Stable     Assessment of pain at discharge: moderate     Anesthetic used: 4% liquid lidocaine solution      Discharge to: ECF     Left via:ambulance     Accompanied by: self     [facility]: [facility]     Dressing Orders:  Cleanse left heel ulcer with normal saline solution apply plain alginate and dry dressing and change every other day or as needed for drainage.  .      Treatment Orders:    Continue antibiotics per Dr Yury Kenny for suppression. Arpan Abdiel prevalon boots or heel ayo while in bed.       May be partial weight bearing up to 50% on left with surgical shoe.      for HBO evaluation - please arrange-hold until out of facility. Culture taken in clinic today. Continue Doxycyline.     [facility] followup visit:  _______________________ 1 week Dr Harrell_______2 weeks Dr Toro_____________________  (Please note your next appointment above and if you are unable to keep, kindly give a 24 hour notice.  Thank you.)     Physician signature:__________________________        If you experience any of the following, please call the [address] during business hours:     * Increase in Pain  * Temperature over 101  * Increase in drainage from your wound  * Drainage with a foul odor  * Bleeding  * Increase in swelling  * Need for compression bandage changes due to slippage, breakthrough drainage.     If you need medical attention outside of the business hours of the [address] [place] [address] Road please contact your PCP or go to the nearest emergency room.       Electronically signed by Lisa Wellington MD on 6/3/2021 at 2:59 PM

## 2021-06-05 LAB — ANAEROBIC CULTURE: NORMAL

## 2021-06-06 LAB
ORGANISM: ABNORMAL
WOUND/ABSCESS: ABNORMAL

## 2021-06-07 ENCOUNTER — HOSPITAL ENCOUNTER (OUTPATIENT)
Dept: WOUND CARE | Age: 61
Discharge: HOME | End: 2021-06-07
Payer: COMMERCIAL

## 2021-06-07 VITALS
HEART RATE: 94 BPM | TEMPERATURE: 98.4 F | DIASTOLIC BLOOD PRESSURE: 58 MMHG | SYSTOLIC BLOOD PRESSURE: 102 MMHG | RESPIRATION RATE: 20 BRPM

## 2021-06-07 DIAGNOSIS — E11.628 DIABETIC FOOT INFECTION (HCC): Primary | ICD-10-CM

## 2021-06-07 DIAGNOSIS — L08.9 DIABETIC FOOT INFECTION (HCC): Primary | ICD-10-CM

## 2021-06-07 PROCEDURE — 6370000000 HC RX 637 (ALT 250 FOR IP): Performed by: PODIATRIST

## 2021-06-07 PROCEDURE — 11042 DBRDMT SUBQ TIS 1ST 20SQCM/<: CPT

## 2021-06-07 RX ORDER — LIDOCAINE HYDROCHLORIDE 40 MG/ML
SOLUTION TOPICAL ONCE
Status: CANCELLED | OUTPATIENT
Start: 2021-06-07 | End: 2021-06-07

## 2021-06-07 RX ORDER — LIDOCAINE 50 MG/G
OINTMENT TOPICAL ONCE
Status: CANCELLED | OUTPATIENT
Start: 2021-06-07 | End: 2021-06-07

## 2021-06-07 RX ORDER — GENTAMICIN SULFATE 1 MG/G
OINTMENT TOPICAL ONCE
Status: CANCELLED | OUTPATIENT
Start: 2021-06-07 | End: 2021-06-07

## 2021-06-07 RX ORDER — BETAMETHASONE DIPROPIONATE 0.05 %
OINTMENT (GRAM) TOPICAL ONCE
Status: CANCELLED | OUTPATIENT
Start: 2021-06-07 | End: 2021-06-07

## 2021-06-07 RX ORDER — BACITRACIN, NEOMYCIN, POLYMYXIN B 400; 3.5; 5 [USP'U]/G; MG/G; [USP'U]/G
OINTMENT TOPICAL ONCE
Status: CANCELLED | OUTPATIENT
Start: 2021-06-07 | End: 2021-06-07

## 2021-06-07 RX ORDER — BACITRACIN ZINC AND POLYMYXIN B SULFATE 500; 1000 [USP'U]/G; [USP'U]/G
OINTMENT TOPICAL ONCE
Status: CANCELLED | OUTPATIENT
Start: 2021-06-07 | End: 2021-06-07

## 2021-06-07 RX ORDER — LIDOCAINE HYDROCHLORIDE 20 MG/ML
JELLY TOPICAL ONCE
Status: CANCELLED | OUTPATIENT
Start: 2021-06-07 | End: 2021-06-07

## 2021-06-07 RX ORDER — LIDOCAINE HYDROCHLORIDE 40 MG/ML
SOLUTION TOPICAL ONCE
Status: COMPLETED | OUTPATIENT
Start: 2021-06-07 | End: 2021-06-07

## 2021-06-07 RX ORDER — CLOBETASOL PROPIONATE 0.5 MG/G
OINTMENT TOPICAL ONCE
Status: CANCELLED | OUTPATIENT
Start: 2021-06-07 | End: 2021-06-07

## 2021-06-07 RX ORDER — LIDOCAINE 40 MG/G
CREAM TOPICAL ONCE
Status: CANCELLED | OUTPATIENT
Start: 2021-06-07 | End: 2021-06-07

## 2021-06-07 RX ORDER — CIPROFLOXACIN 500 MG/1
500 TABLET, FILM COATED ORAL 2 TIMES DAILY
Qty: 28 TABLET | Refills: 0 | Status: SHIPPED | OUTPATIENT
Start: 2021-06-07 | End: 2021-06-21

## 2021-06-07 RX ORDER — GINSENG 100 MG
CAPSULE ORAL ONCE
Status: CANCELLED | OUTPATIENT
Start: 2021-06-07 | End: 2021-06-07

## 2021-06-07 RX ADMIN — LIDOCAINE HYDROCHLORIDE: 40 SOLUTION TOPICAL at 11:18

## 2021-06-07 ASSESSMENT — PAIN DESCRIPTION - PAIN TYPE: TYPE: CHRONIC PAIN

## 2021-06-07 ASSESSMENT — PAIN SCALES - GENERAL: PAINLEVEL_OUTOF10: 2

## 2021-06-07 ASSESSMENT — PAIN DESCRIPTION - LOCATION: LOCATION: FOOT

## 2021-06-07 ASSESSMENT — PAIN DESCRIPTION - DESCRIPTORS: DESCRIPTORS: ACHING

## 2021-06-07 ASSESSMENT — PAIN DESCRIPTION - ORIENTATION: ORIENTATION: LEFT

## 2021-06-07 NOTE — PROGRESS NOTES
diabetic  Contributing Factors: diabetes    Diabetic/Pressure/Non Pressure Ulcers onl y:  Ulcer: Diabetic ulcer, fat layer exposed    If patient has diabetic lower extremity wounds  Kong Classification of diabetic lower extremity wounds:    Grade Description   []  0 No open wound   []  1 Superficial ulcer involving the full skin thickness   [x]  2 Deep ulcer involves ligament, tendon, joint capsule, or fascia  No bone involvement or abscess presence   []  3 Deep Ulcer with abcess formation and/or osteomyelitis   []  4 Localized gangrene   []  5 Extensive gangrene of the foot     Wound: N/A        PAST MEDICAL HISTORY      Diagnosis Date    [facility]bral [facility] occlusion [facility]     Diabetes mellitus ([facility]     Type 2    Hemiparesis affecting left side as late effect of cerebrovascular accident ([facility]     LEFT SIDE NON DOMINANT FOLLOWING STROKE    Hypertension      Past Surgical History:   Procedure Laterality Date    FINGER AMPUTATION      FOOT DEBRIDEMENT Left 2/16/2021    LEFT FOOT DEBRIDEMENT WITH BONE BIOPSY, WOUND VAC APPLICATION performed by Neela Liz DPM at [place] [address] ARTHROSCOPY      TONSILLECTOMY      TRANSESOPHAGEAL ECHOCARDIOGRAM N/A 2/22/2021    TRANSESOPHAGEAL ECHOCARDIOGRAM WITH BUBBLE STUDY performed by Wang Mandujano MD at [address] [place] [address] N/A 1/4/2021    EGD BIOPSY performed by Karina Rees DO at [place] [address] reviewed. No pertinent family history.   Social History     Tobacco Use    Smoking status: Former Smoker    Smokeless tobacco: Never Used   Vaping Use    Vaping Use: Never used   Substance Use Topics    Alcohol use: Yes     Comment: every day drinker for most of adult life    Drug use: No     Allergies   Allergen Reactions    Pcn [Penicillins]      Current Outpatient Medications on File Prior to Encounter   Medication Sig Dispense Refill    gabapentin (NEURONTIN) 100 MG capsule Take 100 mg by mouth 3 times daily.  doxycycline hyclate (VIBRAMYCIN) 100 MG capsule Take 100 mg by mouth 2 times daily      B Complex-C-E-Zn (STRESS B/ZINC) TABS Take 1 tablet by mouth daily      acetaminophen (TYLENOL) 325 MG tablet Take 650 mg by mouth every 6 hours as needed for Pain      bisacodyl (DULCOLAX) 10 MG suppository Place 10 mg rectally daily      ferrous sulfate (IRON 325) 325 (65 Fe) MG tablet Take 325 mg by mouth daily (with breakfast)      Sodium Chloride Flush (SALINE FLUSH IV) Infuse intravenously 2 times daily Flush picc      collagenase 250 UNIT/GM ointment Apply topically daily Apply topically daily.       ascorbic acid (VITAMIN C) 500 MG tablet Take 500 mg by mouth daily      Cholecalciferol (VITAMIN D3) 1.25 MG (87844 UT) CAPS Take by mouth      zinc gluconate 50 MG tablet Take 50 mg by mouth daily      vitamin D (ERGOCALCIFEROL) 1.25 MG (11653 UT) CAPS capsule Take 1 capsule by mouth once a week 5 capsule 0    ipratropium-albuterol (DUONEB) 0.5-2.5 (3) MG/3ML SOLN nebulizer solution Inhale 3 mLs into the lungs every 4 hours 360 mL 0    pantoprazole (PROTONIX) 40 MG tablet Take 1 tablet by mouth every morning (before breakfast) 30 tablet 3    melatonin 3 MG TABS tablet Take 9 mg by mouth nightly as needed      aspirin 81 MG EC tablet Take 81 mg by mouth daily      glimepiride (AMARYL) 2 MG tablet Take 1 tablet by mouth daily (with breakfast) 30 tablet 3    insulin lispro (HUMALOG) 100 UNIT/ML injection vial Inject 0-12 Units into the skin 3 times daily (with meals) 1 vial 3    insulin lispro (HUMALOG) 100 UNIT/ML injection vial Inject 0-6 Units into the skin nightly 1 vial 3    metFORMIN (GLUCOPHAGE) 500 MG tablet Take 1 tablet by mouth 2 times daily (with meals) 60 tablet 3    atorvastatin (LIPITOR) 40 MG tablet Take 1 tablet by mouth nightly 30 tablet 3    clopidogrel (PLAVIX) 75 MG tablet Take 1 tablet by mouth daily 30 tablet 3    vitamin B-1 100 MG tablet Take 1 tablet by mouth are Pre Debridement except measurements:     Wound 12/26/20 Arm Left (Active)   Number of days: 162       Wound 02/15/21 Heel Left (Active)   Number of days: 112       Wound 02/15/21 Heel Right (Active)   Number of days: 112       Wound 03/08/21 Heel Left #1 left heel aquired 12/1/20 (Active)   Wound Image   05/03/21 1000   Dressing Status New dressing applied 05/13/21 1518   Wound Cleansed Cleansed with saline 06/03/21 1429   Dressing/Treatment Alginate;Dry dressing;Roll gauze 06/03/21 1429   Offloading for Diabetic Foot Ulcers Post op shoe 05/13/21 1518   Wound Length (cm) 4.6 cm 06/07/21 1112   Wound Width (cm) 3.4 cm 06/07/21 1112   Wound Depth (cm) 0.6 cm 06/07/21 1112   Wound Surface Area (cm^2) 15.64 cm^2 06/07/21 1112   Change in Wound Size % (l*w) -29.36 06/07/21 1112   Wound Volume (cm^3) 9.38 cm^3 06/07/21 1112   Wound Healing % -11 06/07/21 1112   Post-Procedure Length (cm) 4.6 cm 06/07/21 1128   Post-Procedure Width (cm) 3.4 cm 06/07/21 1128   Post-Procedure Depth (cm) 0.6 cm 06/07/21 1128   Post-Procedure Surface Area (cm^2) 15.64 cm^2 06/07/21 1128   Post-Procedure Volume (cm^3) 9.38 cm^3 06/07/21 1128   Wound Assessment Slough;Pale granulation tissue 06/07/21 1112   Drainage Amount Moderate 06/07/21 1112   Drainage Description Thick; Yellow;Green 06/07/21 1112   Odor None 06/07/21 1112   Adrianne-wound Assessment Intact 06/07/21 1112   Number of days: 91       Wound 03/08/21 Heel Right #2 rt heel aquired 12/1/20 (Active)   Wound Image   04/05/21 0929   Dressing Status New dressing applied 03/18/21 1352   Wound Cleansed Cleansed with saline 04/01/21 1536   Dressing/Treatment Alginate;Collagen;Silicone border 71/07/45 1536   Offloading for Diabetic Foot Ulcers Post op shoe 04/01/21 1536   Wound Length (cm) 0 cm 04/05/21 0929   Wound Width (cm) 0 cm 04/05/21 0929   Wound Depth (cm) 0 cm 04/05/21 0929   Wound Surface Area (cm^2) 0 cm^2 04/05/21 0929   Change in Wound Size % (l*w) 100 04/05/21 0929   Wound appropriate indication for HBO Therapy: Maybe    Treatment Note please see attached Discharge Instructions    Written patient dismissal instructions given to patient and signed by patient or POA.            Electronically signed by Wiliam Mir DPM on 6/7/2021 at 11:34 AM

## 2021-06-17 ENCOUNTER — HOSPITAL ENCOUNTER (OUTPATIENT)
Dept: WOUND CARE | Age: 61
Discharge: HOME | End: 2021-06-17
Payer: COMMERCIAL

## 2021-06-17 VITALS
WEIGHT: 182 LBS | SYSTOLIC BLOOD PRESSURE: 120 MMHG | DIASTOLIC BLOOD PRESSURE: 74 MMHG | HEIGHT: 71 IN | RESPIRATION RATE: 18 BRPM | TEMPERATURE: 97.8 F | HEART RATE: 98 BPM | BODY MASS INDEX: 25.48 KG/M2

## 2021-06-17 DIAGNOSIS — L08.9 DIABETIC FOOT INFECTION (HCC): Primary | ICD-10-CM

## 2021-06-17 DIAGNOSIS — E11.628 DIABETIC FOOT INFECTION (HCC): Primary | ICD-10-CM

## 2021-06-17 PROCEDURE — 99213 OFFICE O/P EST LOW 20 MIN: CPT

## 2021-06-17 RX ORDER — CLOBETASOL PROPIONATE 0.5 MG/G
OINTMENT TOPICAL ONCE
Status: CANCELLED | OUTPATIENT
Start: 2021-06-17 | End: 2021-06-17

## 2021-06-17 RX ORDER — BACITRACIN, NEOMYCIN, POLYMYXIN B 400; 3.5; 5 [USP'U]/G; MG/G; [USP'U]/G
OINTMENT TOPICAL ONCE
Status: CANCELLED | OUTPATIENT
Start: 2021-06-17 | End: 2021-06-17

## 2021-06-17 RX ORDER — LIDOCAINE HYDROCHLORIDE 40 MG/ML
SOLUTION TOPICAL ONCE
Status: CANCELLED | OUTPATIENT
Start: 2021-06-17 | End: 2021-06-17

## 2021-06-17 RX ORDER — GINSENG 100 MG
CAPSULE ORAL ONCE
Status: CANCELLED | OUTPATIENT
Start: 2021-06-17 | End: 2021-06-17

## 2021-06-17 RX ORDER — BACITRACIN ZINC AND POLYMYXIN B SULFATE 500; 1000 [USP'U]/G; [USP'U]/G
OINTMENT TOPICAL ONCE
Status: CANCELLED | OUTPATIENT
Start: 2021-06-17 | End: 2021-06-17

## 2021-06-17 RX ORDER — LIDOCAINE 50 MG/G
OINTMENT TOPICAL ONCE
Status: CANCELLED | OUTPATIENT
Start: 2021-06-17 | End: 2021-06-17

## 2021-06-17 RX ORDER — LIDOCAINE HYDROCHLORIDE 20 MG/ML
JELLY TOPICAL ONCE
Status: CANCELLED | OUTPATIENT
Start: 2021-06-17 | End: 2021-06-17

## 2021-06-17 RX ORDER — BETAMETHASONE DIPROPIONATE 0.05 %
OINTMENT (GRAM) TOPICAL ONCE
Status: CANCELLED | OUTPATIENT
Start: 2021-06-17 | End: 2021-06-17

## 2021-06-17 RX ORDER — LIDOCAINE 40 MG/G
CREAM TOPICAL ONCE
Status: CANCELLED | OUTPATIENT
Start: 2021-06-17 | End: 2021-06-17

## 2021-06-17 RX ORDER — GENTAMICIN SULFATE 1 MG/G
OINTMENT TOPICAL ONCE
Status: CANCELLED | OUTPATIENT
Start: 2021-06-17 | End: 2021-06-17

## 2021-06-17 ASSESSMENT — PAIN DESCRIPTION - PAIN TYPE: TYPE: CHRONIC PAIN

## 2021-06-17 ASSESSMENT — PAIN DESCRIPTION - ORIENTATION: ORIENTATION: LEFT

## 2021-06-17 ASSESSMENT — PAIN DESCRIPTION - FREQUENCY: FREQUENCY: INTERMITTENT

## 2021-06-17 ASSESSMENT — PAIN DESCRIPTION - LOCATION: LOCATION: FOOT

## 2021-06-17 ASSESSMENT — PAIN DESCRIPTION - DESCRIPTORS: DESCRIPTORS: ACHING;TENDER

## 2021-06-17 ASSESSMENT — PAIN SCALES - GENERAL: PAINLEVEL_OUTOF10: 3

## 2021-06-17 NOTE — PROGRESS NOTES
[facility] Followup Visit Note  Referring Physician : Nicola Worthington MD  [id number] [facility] RECORD NUMBER:  [id number]  AGE: 63 y.o. GENDER: male  : 1960  EPISODE DATE:  2021  Subjective:     Chief Complaint   Patient presents with    Wound Check     left heel      HISTORY of PRESENT ILLNESS HPI   Riccardo London is a 63 y.o. male who presents today in regards to follow up evaluation and treatment of wound/ulcer. That patient's past medical, family and social hx were reviewed and changes were made if present. History of Wound Context:  Pt is known to ID s/p D/c from [facility] on  2021  Came in with 1. Left heel ulceration and necrosis of muscle. 2.  Peripheral arterial disease. 3. Acute osteomyelitis, left foot. 4. Cellulitis, left foot with abscess.     Current visit:  2021  Has no c/o  On doxy  Was supposed to be on cipro also   Using gent ointment   vacc off    6/3/2021  A wound culture SHOWED MRSA  MRI SHOWED ACUTE OM/MYOSITIS/CELLULITS  FINISHED  LINEZOLID 600MG PO Q12  ON DOXY   RECX   NO F/C/N/V/D/  50% WEIGHT BEARING    2021  IN WC NAD NO C/O  NO PAIN LE   MRI NOTED   ON DOXY     2021  Still at f on doxycycline he has no isues with it  Left heel has some pain  He is asking about more weight bearing     Sp vancomycin (VANCOCIN) 1,000 mg  Q12H can stop today/2021  Here for f/u left post heel ulcer   Has wound vacc   Currently off has bone palpable  periwound inatct   has left >right ble swelling   No calf pain   Has no c/o  Rue picc without swelling or pain  Tolerating doxycyline informed side effects of photosensitivity     Bilateral iliac angiogram, left femoral  angiography, nitroglycerin infusion left common femoral artery 1000 mcg  x2 boluses, right common femoral artery to left popliteal artery  catheterization, PTA of the left SFA and popliteal artery with 4 x 80  RapidCross balloon, 5 x 300 Saber balloon, 5 x 220 Powerflex balloon, 6  x 100 Powerflex balloon and 6 x 250 IN. PACT Admiral balloon, completion  left femoral angiography, ProGlide closure right common femoral artery  access site. 2/16 1. Excision and debridement of the left heel ulceration to and through  level of deep fascia and muscle. Total measurement is 4.5 cm x 6.0 cm x  0.5 cm in dimension. 2.  Incision and drainage, left foot abscess. 3.  Bone biopsy, left foot. 4.  Application of wound VAC negative pressure therapy. cx Enterococcus  Path Bone biopsy left heel: Medullary bone, negative for osteomyelitis.     Most Recent   Organism   Date Value Ref Range Status   06/03/2021 Pseudomonas aeruginosa (A)  Final   06/03/2021 Staphylococcus aureus (A)  Final   06/03/2021 Staphylococcus coagulase-negative (A)  Final     PAST MEDICAL HISTORY      Diagnosis Date    [facility]     Diabetes mellitus ([facility]     Type 2    Hemiparesis affecting left side as late effect of cerebrovascular accident ([facility]     LEFT SIDE NON DOMINANT FOLLOWING STROKE    Hypertension      Past Surgical History:   Procedure Laterality Date    FINGER AMPUTATION      FOOT DEBRIDEMENT Left 2/16/2021    LEFT FOOT DEBRIDEMENT WITH BONE BIOPSY, WOUND VAC APPLICATION performed by Hugo Harvey DPM at [place] [address] ARTHROSCOPY      TONSILLECTOMY      TRANSESOPHAGEAL ECHOCARDIOGRAM N/A 2/22/2021    TRANSESOPHAGEAL ECHOCARDIOGRAM WITH BUBBLE STUDY performed by Michelle Butler MD at [facility] N/A 1/4/2021    EGD BIOPSY performed by Lazara Soliman DO at [address] [place] [address] reviewed. No pertinent family history.   Social History     Tobacco Use    Smoking status: Former Smoker    Smokeless tobacco: Never Used   Vaping Use    Vaping Use: Never used   Substance Use Topics    Alcohol use: Yes     Comment: every day drinker for most of adult life    Drug use: No     Allergies   Allergen Reactions    Pcn [Penicillins]      Current Outpatient Medications on File Prior to Encounter   Medication Sig Dispense Refill    ciprofloxacin (CIPRO) 500 MG tablet Take 1 tablet by mouth 2 times daily for 14 days 28 tablet 0    gabapentin (NEURONTIN) 100 MG capsule Take 100 mg by mouth 3 times daily.  doxycycline hyclate (VIBRAMYCIN) 100 MG capsule Take 100 mg by mouth 2 times daily      B Complex-C-E-Zn (STRESS B/ZINC) TABS Take 1 tablet by mouth daily      acetaminophen (TYLENOL) 325 MG tablet Take 650 mg by mouth every 6 hours as needed for Pain      bisacodyl (DULCOLAX) 10 MG suppository Place 10 mg rectally daily      ferrous sulfate (IRON 325) 325 (65 Fe) MG tablet Take 325 mg by mouth daily (with breakfast)      magnesium hydroxide (MILK OF MAGNESIA CONCENTRATE) 2400 MG/10ML SUSP Take 30 mLs by mouth daily as needed      Sodium Chloride Flush (SALINE FLUSH IV) Infuse intravenously 2 times daily Flush picc      collagenase 250 UNIT/GM ointment Apply topically daily Apply topically daily.       ascorbic acid (VITAMIN C) 500 MG tablet Take 500 mg by mouth daily      Cholecalciferol (VITAMIN D3) 1.25 MG (18958 UT) CAPS Take by mouth      zinc gluconate 50 MG tablet Take 50 mg by mouth daily      vitamin D (ERGOCALCIFEROL) 1.25 MG (09735 UT) CAPS capsule Take 1 capsule by mouth once a week 5 capsule 0    ipratropium-albuterol (DUONEB) 0.5-2.5 (3) MG/3ML SOLN nebulizer solution Inhale 3 mLs into the lungs every 4 hours 360 mL 0    pantoprazole (PROTONIX) 40 MG tablet Take 1 tablet by mouth every morning (before breakfast) 30 tablet 3    melatonin 3 MG TABS tablet Take 9 mg by mouth nightly as needed      aspirin 81 MG EC tablet Take 81 mg by mouth daily      hydroCHLOROthiazide (HYDRODIURIL) 25 MG tablet Take 25 mg by mouth daily      glimepiride (AMARYL) 2 MG tablet Take 1 tablet by mouth daily (with breakfast) 30 tablet 3    insulin lispro (HUMALOG) 100 UNIT/ML injection vial Inject 0-12 Units into the skin 3 times daily (with meals) 1 vial 3    insulin lispro (HUMALOG) 100 UNIT/ML injection vial Inject 0-6 Units into the skin nightly 1 vial 3    metFORMIN (GLUCOPHAGE) 500 MG tablet Take 1 tablet by mouth 2 times daily (with meals) 60 tablet 3    atorvastatin (LIPITOR) 40 MG tablet Take 1 tablet by mouth nightly 30 tablet 3    clopidogrel (PLAVIX) 75 MG tablet Take 1 tablet by mouth daily 30 tablet 3    vitamin B-1 100 MG tablet Take 1 tablet by mouth daily 30 tablet 3    glucose (GLUTOSE) 40 % GEL Take 37.5 mLs by mouth as needed (hypoglycemia) 45 g 1    glucagon, rDNA, 1 MG injection Inject 1 mg into the muscle as needed for Low blood sugar (Blood glucose less than 70 mg/dL and patient NOT ALERT or NPO and does not have IV access. ) 1 each 0     No current facility-administered medications on file prior to encounter.      REVIEW OF SYSTEMS See HPI  Objective:    /74   Pulse 98   Temp 97.8 °F (36.6 °C) (Temporal)   Resp 18   Ht 5' 11\" (1.803 m)   Wt 182 lb (82.6 kg)   BMI 25.38 kg/m²   Wt Readings from Last 3 Encounters:   06/17/21 182 lb (82.6 kg)   06/03/21 182 lb (82.6 kg)   05/17/21 182 lb (82.6 kg)     PHYSICAL EXAM  CONSTITUTIONAL:   Awake, alert, cooperative    HEENT: AT/NC glasses  HEART: S1/S2  RS: CTAB ant  ABD: SOFT NT/ND distended  EXT:  Trace EDEMA  SKIN: Open wound Present post heel wound bed moist serous      Wound Care Documentation:  Wound 12/26/20 Arm Left (Active)   Number of days: 123       Wound 02/15/21 Heel Left (Active)   Number of days: 73       Wound 02/15/21 Heel Right (Active)   Number of days: 73       Wound 03/08/21 Heel Left #1 left heel aquired 12/1/20 (Active)   Wound Image   03/22/21 0911   Dressing Status New dressing applied 04/19/21 1220   Wound Cleansed Cleansed with saline 04/19/21 1220   Dressing/Treatment Alginate;ABD 04/19/21 1220   Offloading for Diabetic Foot Ulcers Post op shoe 04/19/21 1220   Wound Length (cm) 4.2 cm 04/29/21 1302 Wound Width (cm) 3.2 cm 04/29/21 1302   Wound Depth (cm) 0.5 cm 04/29/21 1302   Wound Surface Area (cm^2) 13.44 cm^2 04/29/21 1302   Change in Wound Size % (l*w) -11.17 04/29/21 1302   Wound Volume (cm^3) 6.72 cm^3 04/29/21 1302   Wound Healing % 21 04/29/21 1302   Post-Procedure Length (cm) 4.5 cm 04/19/21 1132   Post-Procedure Width (cm) 2.9 cm 04/19/21 1132   Post-Procedure Depth (cm) 0.5 cm 04/19/21 1132   Post-Procedure Surface Area (cm^2) 13.05 cm^2 04/19/21 1132   Post-Procedure Volume (cm^3) 6.52 cm^3 04/19/21 1132   Wound Assessment Pale granulation tissue;Slough 04/29/21 1304   Drainage Amount Moderate 04/29/21 1304   Drainage Description Serosanguinous; Yellow 04/29/21 1304   Odor None 04/29/21 1304   Adrianne-wound Assessment Maceration 04/29/21 1304   Number of days: 52       Wound 03/08/21 Heel Right #2 rt heel aquired 12/1/20 (Active)   Wound Image   04/05/21 0929   Dressing Status New dressing applied 03/18/21 1352   Wound Cleansed Cleansed with saline 04/01/21 1536   Dressing/Treatment Alginate;Collagen;Silicone border 78/00/70 1536   Offloading for Diabetic Foot Ulcers Post op shoe 04/01/21 1536   Wound Length (cm) 0 cm 04/05/21 0929   Wound Width (cm) 0 cm 04/05/21 0929   Wound Depth (cm) 0 cm 04/05/21 0929   Wound Surface Area (cm^2) 0 cm^2 04/05/21 0929   Change in Wound Size % (l*w) 100 04/05/21 0929   Wound Volume (cm^3) 0 cm^3 04/05/21 0929   Wound Healing % 100 04/05/21 0929   Post-Procedure Length (cm) 0 cm 04/05/21 1011   Post-Procedure Width (cm) 0 cm 04/05/21 1011   Post-Procedure Depth (cm) 0 cm 04/05/21 1011   Post-Procedure Surface Area (cm^2) 0 cm^2 04/05/21 1011   Post-Procedure Volume (cm^3) 0 cm^3 04/05/21 1011   Wound Assessment Fibrin 04/01/21 1357   Drainage Amount None 04/01/21 1357   Drainage Description Yellow 03/22/21 0911   Odor None 04/01/21 1357   Adrianne-wound Assessment Maceration 04/01/21 1357   Number of days: 52         Assessment:   S/p rx MSSA bacteremia   BILATERAL HEEL PRESSURE ULCERS   NECROTIC WOUND, OSTEOMYELITIS, Cellulitis left foot with palpable bone   S/p LEFT FOOT DEBRIDEMENT WITH BONE BIOPSY, WOUND VAC APPLICATION  Bone biopsy left heel: Medullary bone, negative for osteomyelitis. S/p angio    UTI  - proteus s/p rx with cefepime  H/o vitamin D deficiency on suppleemnts    2/14 wound cx Mixed Gram positive organisms   Proteus species   TTE Summary No vegetations seen otherwise. Plan:   Cont  doxycyline 100mg po q12 as suppression    Wound care     allergic to pcn   F/u 4 weeks     Orders Placed This Encounter   Procedures    Initiate Outpatient Wound Care Protocol     No orders of the defined types were placed in this encounter. Plan:   Treatment Note please see attached Discharge Instructions    Written patient dismissal instructions given to patient and signed by patient or POA. Discharge Instructions         Discharge condition: Stable     Assessment of pain at discharge: moderate     Anesthetic used: 4% liquid lidocaine solution      Discharge to: ECF     Left via:ambulance     Accompanied by: self     [facility]: [facility]     Dressing Orders:  Cleanse left heel ulcer with normal saline solution apply first gentamicin 0.1% ointment and cover with plain alginate and dry dressing and change every other day or as needed for drainage.  .      Treatment Orders:    Continue antibiotics per Dr Harvey Drain for suppression. Kala Blend prevalon boots or heel ayo while in bed.       May be partial weight bearing up to 50% on left with surgical shoe.      for HBO evaluation - please arrange-hold until out of facility.       [facility] followup visit: 1 Month Dr Toro 6/17 @ 230 PM_______________________ 2 weeks Dr Harrell____________________________  (Please note your next appointment above and if you are unable to keep, kindly give a 24 hour notice.  Thank you.)     Physician signature:__________________________        If you experience any of the following, please call the [facility] during business hours:     * Increase in Pain  * Temperature over 101  * Increase in drainage from your wound  * Drainage with a foul odor  * Bleeding  * Increase in swelling  * Need for compression bandage changes due to slippage, breakthrough drainage.     If you need medical attention outside of the business hours of the [facility] please contact your PCP or go to the nearest emergency room.                  Electronically signed by Shawna Dorantes MD on 6/17/2021 at 1:41 PM

## 2021-06-21 ENCOUNTER — TELEPHONE (OUTPATIENT)
Dept: WOUND CARE | Age: 61
End: 2021-06-21

## 2021-06-21 ENCOUNTER — HOSPITAL ENCOUNTER (OUTPATIENT)
Dept: WOUND CARE | Age: 61
Discharge: HOME | End: 2021-06-21
Payer: COMMERCIAL

## 2021-06-21 VITALS
SYSTOLIC BLOOD PRESSURE: 122 MMHG | DIASTOLIC BLOOD PRESSURE: 60 MMHG | RESPIRATION RATE: 18 BRPM | HEART RATE: 72 BPM | TEMPERATURE: 98.1 F

## 2021-06-21 DIAGNOSIS — L08.9 DIABETIC FOOT INFECTION (HCC): Primary | ICD-10-CM

## 2021-06-21 DIAGNOSIS — E11.628 DIABETIC FOOT INFECTION (HCC): Primary | ICD-10-CM

## 2021-06-21 PROCEDURE — 11042 DBRDMT SUBQ TIS 1ST 20SQCM/<: CPT

## 2021-06-21 PROCEDURE — 6370000000 HC RX 637 (ALT 250 FOR IP): Performed by: PODIATRIST

## 2021-06-21 RX ORDER — BACITRACIN ZINC AND POLYMYXIN B SULFATE 500; 1000 [USP'U]/G; [USP'U]/G
OINTMENT TOPICAL ONCE
Status: CANCELLED | OUTPATIENT
Start: 2021-06-21 | End: 2021-06-21

## 2021-06-21 RX ORDER — LIDOCAINE 40 MG/G
CREAM TOPICAL ONCE
Status: CANCELLED | OUTPATIENT
Start: 2021-06-21 | End: 2021-06-21

## 2021-06-21 RX ORDER — LIDOCAINE HYDROCHLORIDE 20 MG/ML
JELLY TOPICAL ONCE
Status: CANCELLED | OUTPATIENT
Start: 2021-06-21 | End: 2021-06-21

## 2021-06-21 RX ORDER — BETAMETHASONE DIPROPIONATE 0.05 %
OINTMENT (GRAM) TOPICAL ONCE
Status: CANCELLED | OUTPATIENT
Start: 2021-06-21 | End: 2021-06-21

## 2021-06-21 RX ORDER — GINSENG 100 MG
CAPSULE ORAL ONCE
Status: CANCELLED | OUTPATIENT
Start: 2021-06-21 | End: 2021-06-21

## 2021-06-21 RX ORDER — LIDOCAINE 50 MG/G
OINTMENT TOPICAL ONCE
Status: CANCELLED | OUTPATIENT
Start: 2021-06-21 | End: 2021-06-21

## 2021-06-21 RX ORDER — LIDOCAINE HYDROCHLORIDE 40 MG/ML
SOLUTION TOPICAL ONCE
Status: COMPLETED | OUTPATIENT
Start: 2021-06-21 | End: 2021-06-21

## 2021-06-21 RX ORDER — GENTAMICIN SULFATE 1 MG/G
OINTMENT TOPICAL ONCE
Status: CANCELLED | OUTPATIENT
Start: 2021-06-21 | End: 2021-06-21

## 2021-06-21 RX ORDER — CLOBETASOL PROPIONATE 0.5 MG/G
OINTMENT TOPICAL ONCE
Status: CANCELLED | OUTPATIENT
Start: 2021-06-21 | End: 2021-06-21

## 2021-06-21 RX ORDER — BACITRACIN, NEOMYCIN, POLYMYXIN B 400; 3.5; 5 [USP'U]/G; MG/G; [USP'U]/G
OINTMENT TOPICAL ONCE
Status: CANCELLED | OUTPATIENT
Start: 2021-06-21 | End: 2021-06-21

## 2021-06-21 RX ORDER — LIDOCAINE HYDROCHLORIDE 40 MG/ML
SOLUTION TOPICAL ONCE
Status: CANCELLED | OUTPATIENT
Start: 2021-06-21 | End: 2021-06-21

## 2021-06-21 RX ADMIN — LIDOCAINE HYDROCHLORIDE: 40 SOLUTION TOPICAL at 08:24

## 2021-06-21 ASSESSMENT — PAIN SCALES - GENERAL: PAINLEVEL_OUTOF10: 0

## 2021-06-21 NOTE — TELEPHONE ENCOUNTER
Spoke to Dr Tracy Swift regarding culture. Patient to continue doxycycline and add cipro 500 mg twice daily. [facility] spoke to nurse Danyel Lr they will start this.

## 2021-06-21 NOTE — PROGRESS NOTES
W[facility] Healing Center  History and Physical/Consultation  Podiatry    Referring Physician : Sonal Watson MD  [address] [facility] RECORD NUMBER:  [id number]  AGE: 63 y.o. GENDER: male  : 1960  EPISODE DATE:  2021  Subjective:     Chief Complaint   Patient presents with    Wound Check         HISTORY of PRESENT ILLNESS HPI     Solitario Buchanan is a 63 y.o. male who presents today for wound/ulcer evaluation. History of Wound Context:  The patient has had a wound of bilateral heels which was first noted approximately over a month ago. This has been treated with surgical debridement. On their initial visit to the wound healing center, 21 ,  the patient has noted that the wound has not been improving. The patient has had similar previous wounds in the past.      Pt is not on abx at time of initial visit. 3/22/21 - Wound VAC MWF continue to left heel. Aquacel Ag to right heel. Debrided toenails 1-5 in thickness and length. FU 1 week. IV Abx per Dr. Lo Whitman. 21 - Wound VAC MWF to left heel. Cultures obtained. FU 1 week after visit with Dr. Lo Whitman. PO Abx.  21 - DC wound VAC to left heel. Alginate to wound. FU 1 week  21 - Alginate and gentamicin ointment QOD. Partial WB to heel at 50% to foot with surgical shoe and walker. 5/3/21 - Alginate and gentamicin ointment QOD. MRI left heel    5/10/21 - Alginate and gentamicin ointment QOD. MRI left heel pending. 21 - Alginate and gentamicin ointment QOD. MRI reviewed confirming OM. Discussed HBOT referral and partial calcanectomy. He opts for HBOT consultation. 21 - Alginate and add gentamicin ointment QD. HBOT referral.  Patient hold off on partial calcanectomy. FU 1 week    21 - Alginate and add gentamicin ointment QD. HBOT referral.  Patient hold off on partial calcanectomy.   FU 1 week      Wound/Ulcer Pain Timing/Severity: none  Quality of pain: N/A  Severity:  0 / 10   Modifying Factors: None  Associated Signs/Symptoms: none    Ulcer Identification:  Ulcer Type: arterial and diabetic  Contributing Factors: diabetes    Diabetic/Pressure/Non Pressure Ulcers onl y:  Ulcer: Diabetic ulcer, fat layer exposed    If patient has diabetic lower extremity wounds  Kong Classification of diabetic lower extremity wounds:    Grade Description   []  0 No open wound   []  1 Superficial ulcer involving the full skin thickness   [x]  2 Deep ulcer involves ligament, tendon, joint capsule, or fascia  No bone involvement or abscess presence   []  3 Deep Ulcer with abcess formation and/or osteomyelitis   []  4 Localized gangrene   []  5 Extensive gangrene of the foot     Wound: N/A        PAST MEDICAL HISTORY      Diagnosis Date    [facility]bral [facility] occlusion [facility] [address] [address]     Diabetes mellitus ([facility] [address]     Type 2    Hemiparesis affecting left side as late effect of cerebrovascular accident ([facility] [address]     LEFT SIDE NON DOMINANT FOLLOWING STROKE    Hypertension      Past Surgical History:   Procedure Laterality Date    FINGER AMPUTATION      FOOT DEBRIDEMENT Left 2/16/2021    LEFT FOOT DEBRIDEMENT WITH BONE BIOPSY, WOUND VAC APPLICATION performed by Rand Simon DPM at [place] [address] ARTHROSCOPY      TONSILLECTOMY      TRANSESOPHAGEAL ECHOCARDIOGRAM N/A 2/22/2021    TRANSESOPHAGEAL ECHOCARDIOGRAM WITH BUBBLE STUDY performed by Elsie Acuna MD at [facility] N/A 1/4/2021    EGD BIOPSY performed by Zelia Cranker, DO at [place] [address]     History reviewed. No pertinent family history.   Social History     Tobacco Use    Smoking status: Former Smoker    Smokeless tobacco: Never Used   Vaping Use    Vaping Use: Never used   Substance Use Topics    Alcohol use: Yes     Comment: every day drinker for most of adult life    Drug use: No     Allergies   Allergen Reactions    Pcn [Penicillins]      Current Outpatient Medications on File Prior to tablet Take 25 mg by mouth daily      glimepiride (AMARYL) 2 MG tablet Take 1 tablet by mouth daily (with breakfast) 30 tablet 3    insulin lispro (HUMALOG) 100 UNIT/ML injection vial Inject 0-12 Units into the skin 3 times daily (with meals) 1 vial 3    insulin lispro (HUMALOG) 100 UNIT/ML injection vial Inject 0-6 Units into the skin nightly 1 vial 3    metFORMIN (GLUCOPHAGE) 500 MG tablet Take 1 tablet by mouth 2 times daily (with meals) 60 tablet 3    atorvastatin (LIPITOR) 40 MG tablet Take 1 tablet by mouth nightly 30 tablet 3    clopidogrel (PLAVIX) 75 MG tablet Take 1 tablet by mouth daily 30 tablet 3    vitamin B-1 100 MG tablet Take 1 tablet by mouth daily 30 tablet 3     No current facility-administered medications on file prior to encounter.        REVIEW OF SYSTEMS   ROS : All others Negative if blank [], Positive if [x]  General Vascular   [] Fevers [] Claudication   [] Chills [] Rest Pain   Skin Neurologic   [x] Tissue Loss [x] Lower extremity neuropathy     Objective:    /60   Pulse 72   Temp 98.1 °F (36.7 °C) (Temporal)   Resp 18   Wt Readings from Last 3 Encounters:   06/17/21 182 lb (82.6 kg)   06/03/21 182 lb (82.6 kg)   05/17/21 182 lb (82.6 kg)       PHYSICAL EXAM   CONSTITUTIONAL:   Awake, alert, cooperative   PSYCHIATRIC :  Oriented to time, place and person      normal insight to disease process  EXTREMITIES:   R LE Open wounds are noted   Skin color is normal   Edema is not noted   Sensation deficit noted - to foot   Palpation of the foot does cause pain   5/5 strength DF/PF  L LE Open wounds are noted   Skin color is abnormal with hyperpigmentation   Edema is not noted   Sensation deficit noted - bilateral feet   Palpation of the foot does cause pain   5/5 strength DF/PF  R dorsalis pedis +2 L dorsalis pedis +2   R posterior tibial +2 L posterior tibial +2     Assessment:     Problem List Items Addressed This Visit     Diabetic foot infection ([facility] [address] - Primary    Relevant Orders    Initiate Outpatient Wound Care Protocol          Pre Debridement Measurements:  Are located in the [facility]  Documentation Flow Sheet  Post Debridement Measurements:  Wound/Ulcer Descriptions are Pre Debridement except measurements:     Wound 12/26/20 Arm Left (Active)   Number of days: 176       Wound 02/15/21 Heel Left (Active)   Number of days: 126       Wound 02/15/21 Heel Right (Active)   Number of days: 126       Wound 03/08/21 Heel Left #1 left heel aquired 12/1/20 (Active)   Wound Image   06/21/21 0821   Dressing Status New dressing applied 06/17/21 1345   Wound Cleansed Cleansed with saline 06/17/21 1345   Dressing/Treatment Alginate;ABD;Dry dressing;Roll gauze 06/17/21 1345   Offloading for Diabetic Foot Ulcers Post op shoe 06/17/21 1345   Wound Length (cm) 4.2 cm 06/21/21 0821   Wound Width (cm) 3 cm 06/21/21 0821   Wound Depth (cm) 0.5 cm 06/21/21 0821   Wound Surface Area (cm^2) 12.6 cm^2 06/21/21 0821   Change in Wound Size % (l*w) -4.22 06/21/21 0821   Wound Volume (cm^3) 6.3 cm^3 06/21/21 0821   Wound Healing % 26 06/21/21 0821   Post-Procedure Length (cm) 4.2 cm 06/21/21 0839   Post-Procedure Width (cm) 3 cm 06/21/21 0839   Post-Procedure Depth (cm) 0.5 cm 06/21/21 0839   Post-Procedure Surface Area (cm^2) 12.6 cm^2 06/21/21 0839   Post-Procedure Volume (cm^3) 6.3 cm^3 06/21/21 0839   Wound Assessment Slough;Pale granulation tissue 06/21/21 0821   Drainage Amount Moderate 06/21/21 0821   Drainage Description Yellow 06/21/21 0821   Odor None 06/21/21 0821   Adrianne-wound Assessment Maceration; Intact 06/21/21 0821   Number of days: 104       Wound 03/08/21 Heel Right #2 rt heel aquired 12/1/20 (Active)   Wound Image   04/05/21 0929   Dressing Status New dressing applied 03/18/21 1352   Wound Cleansed Cleansed with saline 04/01/21 1536   Dressing/Treatment Alginate;Collagen;Silicone border 05/90/23 1536   Offloading for Diabetic Foot Ulcers Post op shoe 04/01/21 1536   Wound Length (cm) 0 cm 04/05/21 0929   Wound Width (cm) 0 cm 04/05/21 0929   Wound Depth (cm) 0 cm 04/05/21 0929   Wound Surface Area (cm^2) 0 cm^2 04/05/21 0929   Change in Wound Size % (l*w) 100 04/05/21 0929   Wound Volume (cm^3) 0 cm^3 04/05/21 0929   Wound Healing % 100 04/05/21 0929   Post-Procedure Length (cm) 0 cm 04/05/21 1011   Post-Procedure Width (cm) 0 cm 04/05/21 1011   Post-Procedure Depth (cm) 0 cm 04/05/21 1011   Post-Procedure Surface Area (cm^2) 0 cm^2 04/05/21 1011   Post-Procedure Volume (cm^3) 0 cm^3 04/05/21 1011   Wound Assessment Fibrin 04/01/21 1357   Drainage Amount None 04/01/21 1357   Drainage Description Yellow 03/22/21 0911   Odor None 04/01/21 1357   Adrianne-wound Assessment Maceration 04/01/21 1357   Number of days: 104     Incision 02/19/21 Groin Right (Active)   Number of days: 121       Procedure Note  Indications:  Based on my examination of this patient's wound(s)/ulcer(s) today, debridement is required to promote healing and evaluate the wound base. Performed by: Radha Wick DPM    Consent obtained:  Yes    Time out taken:  Yes    Pain Control: Anesthetic  Anesthetic: 4% Lidocaine Liquid Topical     Debridement:Excisional Debridement    Using curette the wound(s)/ulcer(s) was/were sharply debrided down through and including the removal of subcutaneous tissue. Devitalized Tissue Debrided:  slough to stimulate bleeding to promote healing, post debridement good bleeding base and wound edges noted    Wound/Ulcer #: 2    Percent of Wound/Ulcer Debrided: 100%    Total Surface Area Debrided:  12.6 sq cm     Estimated Blood Loss:  None  Hemostasis Achieved:  by pressure    Procedural Pain:  2  / 10   Post Procedural Pain:  2 / 10     Response to treatment:  Well tolerated by patient. A culture was done.       Plan:     Pt is a smoker   - Discussed relationship of smoking and negative affects on wound healing   - Emphasized importance of tobacco avoidace/cessation   - Script for nicotine patch given to patient   - Information regarding support groups for smoking cessation given    In my professional opinion and based off the information that is available at this time this patient has appropriate indication for HBO Therapy: Maybe    Treatment Note please see attached Discharge Instructions    Written patient dismissal instructions given to patient and signed by patient or POA.            Electronically signed by Vinicio Fay DPM on 6/21/2021 at 8:49 AM

## 2021-06-28 ENCOUNTER — HOSPITAL ENCOUNTER (OUTPATIENT)
Dept: WOUND CARE | Age: 61
Discharge: HOME | End: 2021-06-28
Payer: COMMERCIAL

## 2021-06-28 VITALS
SYSTOLIC BLOOD PRESSURE: 124 MMHG | HEART RATE: 76 BPM | HEIGHT: 71 IN | RESPIRATION RATE: 16 BRPM | WEIGHT: 182 LBS | DIASTOLIC BLOOD PRESSURE: 72 MMHG | BODY MASS INDEX: 25.48 KG/M2 | TEMPERATURE: 98.4 F

## 2021-06-28 DIAGNOSIS — L08.9 DIABETIC FOOT INFECTION (HCC): Primary | ICD-10-CM

## 2021-06-28 DIAGNOSIS — E11.628 DIABETIC FOOT INFECTION (HCC): Primary | ICD-10-CM

## 2021-06-28 PROCEDURE — 11042 DBRDMT SUBQ TIS 1ST 20SQCM/<: CPT

## 2021-06-28 PROCEDURE — 6370000000 HC RX 637 (ALT 250 FOR IP): Performed by: PODIATRIST

## 2021-06-28 RX ORDER — BETAMETHASONE DIPROPIONATE 0.05 %
OINTMENT (GRAM) TOPICAL ONCE
Status: CANCELLED | OUTPATIENT
Start: 2021-06-28 | End: 2021-06-28

## 2021-06-28 RX ORDER — LIDOCAINE HYDROCHLORIDE 20 MG/ML
JELLY TOPICAL ONCE
Status: CANCELLED | OUTPATIENT
Start: 2021-06-28 | End: 2021-06-28

## 2021-06-28 RX ORDER — BACITRACIN ZINC AND POLYMYXIN B SULFATE 500; 1000 [USP'U]/G; [USP'U]/G
OINTMENT TOPICAL ONCE
Status: CANCELLED | OUTPATIENT
Start: 2021-06-28 | End: 2021-06-28

## 2021-06-28 RX ORDER — LIDOCAINE HYDROCHLORIDE 40 MG/ML
SOLUTION TOPICAL ONCE
Status: CANCELLED | OUTPATIENT
Start: 2021-06-28 | End: 2021-06-28

## 2021-06-28 RX ORDER — BACITRACIN, NEOMYCIN, POLYMYXIN B 400; 3.5; 5 [USP'U]/G; MG/G; [USP'U]/G
OINTMENT TOPICAL ONCE
Status: CANCELLED | OUTPATIENT
Start: 2021-06-28 | End: 2021-06-28

## 2021-06-28 RX ORDER — LIDOCAINE 50 MG/G
OINTMENT TOPICAL ONCE
Status: CANCELLED | OUTPATIENT
Start: 2021-06-28 | End: 2021-06-28

## 2021-06-28 RX ORDER — GENTAMICIN SULFATE 1 MG/G
OINTMENT TOPICAL ONCE
Status: CANCELLED | OUTPATIENT
Start: 2021-06-28 | End: 2021-06-28

## 2021-06-28 RX ORDER — GINSENG 100 MG
CAPSULE ORAL ONCE
Status: CANCELLED | OUTPATIENT
Start: 2021-06-28 | End: 2021-06-28

## 2021-06-28 RX ORDER — CLOBETASOL PROPIONATE 0.5 MG/G
OINTMENT TOPICAL ONCE
Status: CANCELLED | OUTPATIENT
Start: 2021-06-28 | End: 2021-06-28

## 2021-06-28 RX ORDER — LIDOCAINE 40 MG/G
CREAM TOPICAL ONCE
Status: CANCELLED | OUTPATIENT
Start: 2021-06-28 | End: 2021-06-28

## 2021-06-28 RX ORDER — LIDOCAINE HYDROCHLORIDE 40 MG/ML
SOLUTION TOPICAL ONCE
Status: COMPLETED | OUTPATIENT
Start: 2021-06-28 | End: 2021-06-28

## 2021-06-28 RX ADMIN — LIDOCAINE HYDROCHLORIDE: 40 SOLUTION TOPICAL at 08:44

## 2021-06-28 NOTE — PROGRESS NOTES
Wound Healing Center  History and Physical/Consultation  Podiatry    Referring Physician : Bismark Alexis MD  [address] [facility] RECORD NUMBER:  [id number]  AGE: 63 y.o. GENDER: male  : 1960  EPISODE DATE:  2021  Subjective:     Chief Complaint   Patient presents with    Wound Check     LEFT HEEL         HISTORY of PRESENT ILLNESS HPI     Flavia Camargo is a 63 y.o. male who presents today for wound/ulcer evaluation. History of Wound Context:  The patient has had a wound of bilateral heels which was first noted approximately over a month ago. This has been treated with surgical debridement. On their initial visit to the wound healing center, 21 ,  the patient has noted that the wound has not been improving. The patient has had similar previous wounds in the past.      Pt is not on abx at time of initial visit. 3/22/21 - Wound VAC MWF continue to left heel. Aquacel Ag to right heel. Debrided toenails 1-5 in thickness and length. FU 1 week. IV Abx per Dr. Isabelle Walters. 21 - Wound VAC MWF to left heel. Cultures obtained. FU 1 week after visit with Dr. Isabelle Walters. PO Abx.  21 - DC wound VAC to left heel. Alginate to wound. FU 1 week  21 - Alginate and gentamicin ointment QOD. Partial WB to heel at 50% to foot with surgical shoe and walker. 5/3/21 - Alginate and gentamicin ointment QOD. MRI left heel    5/10/21 - Alginate and gentamicin ointment QOD. MRI left heel pending. 21 - Alginate and gentamicin ointment QOD. MRI reviewed confirming OM. Discussed HBOT referral and partial calcanectomy. He opts for HBOT consultation. 21 - Alginate and add gentamicin ointment QD. HBOT referral.  Patient hold off on partial calcanectomy. FU 1 week    21 - Alginate and add gentamicin ointment QD. HBOT referral.  Patient hold off on partial calcanectomy. FU 1 week     21 - Alginate and add gentamicin ointment QD.   HBOT referral.  Patient hold off on partial calcanectomy. FU 1 week. Patient gave me consent (verbal) to speak with his brother Nieves Chow regarding his at home arrangement. HBOT is on hold until patient is at home. Wound/Ulcer Pain Timing/Severity: none  Quality of pain: N/A  Severity:  0 / 10   Modifying Factors: None  Associated Signs/Symptoms: none    Ulcer Identification:  Ulcer Type: arterial and diabetic  Contributing Factors: diabetes    Diabetic/Pressure/Non Pressure Ulcers onl y:  Ulcer: Diabetic ulcer, fat layer exposed    If patient has diabetic lower extremity wounds  Kong Classification of diabetic lower extremity wounds:    Grade Description   []  0 No open wound   []  1 Superficial ulcer involving the full skin thickness   [x]  2 Deep ulcer involves ligament, tendon, joint capsule, or fascia  No bone involvement or abscess presence   []  3 Deep Ulcer with abcess formation and/or osteomyelitis   []  4 Localized gangrene   []  5 Extensive gangrene of the foot     Wound: N/A        PAST MEDICAL HISTORY      Diagnosis Date    Cerebral artery occlusion with cerebral infarction ([facility] [address]     Diabetes mellitus ([facility] [address]     Type 2    Hemiparesis affecting left side as late effect of cerebrovascular accident ([facility] [address]     LEFT SIDE NON DOMINANT FOLLOWING STROKE    Hypertension      Past Surgical History:   Procedure Laterality Date    FINGER AMPUTATION      FOOT DEBRIDEMENT Left 2/16/2021    LEFT FOOT DEBRIDEMENT WITH BONE BIOPSY, WOUND VAC APPLICATION performed by Afua Chicas DPM at [place] [address] ARTHROSCOPY      TONSILLECTOMY      TRANSESOPHAGEAL ECHOCARDIOGRAM N/A 2/22/2021    TRANSESOPHAGEAL ECHOCARDIOGRAM WITH BUBBLE STUDY performed by Dottie Roper MD at [address] [facility] N/A 1/4/2021    EGD BIOPSY performed by Sam Burton DO at [place] [address]     History reviewed. No pertinent family history.   Social History     Tobacco Use    Smoking status: Former Smoker    Smokeless tobacco: Never Used   Vaping Use    Vaping Use: Never used   Substance Use Topics    Alcohol use: Yes     Comment: every day drinker for most of adult life    Drug use: No     Allergies   Allergen Reactions    Pcn [Penicillins]      Current Outpatient Medications on File Prior to Encounter   Medication Sig Dispense Refill    gabapentin (NEURONTIN) 100 MG capsule Take 100 mg by mouth 3 times daily.  doxycycline hyclate (VIBRAMYCIN) 100 MG capsule Take 100 mg by mouth 2 times daily      B Complex-C-E-Zn (STRESS B/ZINC) TABS Take 1 tablet by mouth daily      acetaminophen (TYLENOL) 325 MG tablet Take 650 mg by mouth every 6 hours as needed for Pain      bisacodyl (DULCOLAX) 10 MG suppository Place 10 mg rectally daily      ferrous sulfate (IRON 325) 325 (65 Fe) MG tablet Take 325 mg by mouth daily (with breakfast)      magnesium hydroxide (MILK OF MAGNESIA CONCENTRATE) 2400 MG/10ML SUSP Take 30 mLs by mouth daily as needed      Sodium Chloride Flush (SALINE FLUSH IV) Infuse intravenously 2 times daily Flush picc      collagenase 250 UNIT/GM ointment Apply topically daily Apply topically daily.  ascorbic acid (VITAMIN C) 500 MG tablet Take 500 mg by mouth daily      Cholecalciferol (VITAMIN D3) 1.25 MG (60640 UT) CAPS Take by mouth      zinc gluconate 50 MG tablet Take 50 mg by mouth daily      glucose (GLUTOSE) 40 % GEL Take 37.5 mLs by mouth as needed (hypoglycemia) 45 g 1    glucagon, rDNA, 1 MG injection Inject 1 mg into the muscle as needed for Low blood sugar (Blood glucose less than 70 mg/dL and patient NOT ALERT or NPO and does not have IV access. ) 1 each 0    vitamin D (ERGOCALCIFEROL) 1.25 MG (94568 UT) CAPS capsule Take 1 capsule by mouth once a week 5 capsule 0    ipratropium-albuterol (DUONEB) 0.5-2.5 (3) MG/3ML SOLN nebulizer solution Inhale 3 mLs into the lungs every 4 hours 360 mL 0    pantoprazole (PROTONIX) 40 MG tablet Take 1 tablet by mouth every morning (before breakfast) 30 tablet 3    melatonin 3 MG TABS tablet Take 9 mg by mouth nightly as needed      aspirin 81 MG EC tablet Take 81 mg by mouth daily      hydroCHLOROthiazide (HYDRODIURIL) 25 MG tablet Take 25 mg by mouth daily      glimepiride (AMARYL) 2 MG tablet Take 1 tablet by mouth daily (with breakfast) 30 tablet 3    insulin lispro (HUMALOG) 100 UNIT/ML injection vial Inject 0-12 Units into the skin 3 times daily (with meals) 1 vial 3    insulin lispro (HUMALOG) 100 UNIT/ML injection vial Inject 0-6 Units into the skin nightly 1 vial 3    metFORMIN (GLUCOPHAGE) 500 MG tablet Take 1 tablet by mouth 2 times daily (with meals) 60 tablet 3    atorvastatin (LIPITOR) 40 MG tablet Take 1 tablet by mouth nightly 30 tablet 3    clopidogrel (PLAVIX) 75 MG tablet Take 1 tablet by mouth daily 30 tablet 3    vitamin B-1 100 MG tablet Take 1 tablet by mouth daily 30 tablet 3     No current facility-administered medications on file prior to encounter.        REVIEW OF SYSTEMS   ROS : All others Negative if blank [], Positive if [x]  General Vascular   [] Fevers [] Claudication   [] Chills [] Rest Pain   Skin Neurologic   [x] Tissue Loss [x] Lower extremity neuropathy     Objective:    /72   Pulse 76   Temp 98.4 °F (36.9 °C) (Temporal)   Resp 16   Ht 5' 11\" (1.803 m)   Wt 182 lb (82.6 kg)   BMI 25.38 kg/m²   Wt Readings from Last 3 Encounters:   06/28/21 182 lb (82.6 kg)   06/17/21 182 lb (82.6 kg)   06/03/21 182 lb (82.6 kg)       PHYSICAL EXAM   CONSTITUTIONAL:   Awake, alert, cooperative   PSYCHIATRIC :  Oriented to time, place and person      normal insight to disease process  EXTREMITIES:   R LE Open wounds are noted   Skin color is normal   Edema is not noted   Sensation deficit noted - to foot   Palpation of the foot does cause pain   5/5 strength DF/PF  L LE Open wounds are noted   Skin color is abnormal with hyperpigmentation   Edema is not noted   Sensation deficit noted - bilateral feet   Palpation of the foot does cause pain   5/5 strength DF/PF  R dorsalis pedis +2 L dorsalis pedis +2   R posterior tibial +2 L posterior tibial +2     Assessment:     Problem List Items Addressed This Visit     Diabetic foot infection ([address] - Primary    Relevant Orders    Initiate Outpatient Wound Care Protocol          Pre Debridement Measurements:  Are located in the [facility]  Documentation Flow Sheet  Post Debridement Measurements:  Wound/Ulcer Descriptions are Pre Debridement except measurements:     Wound 12/26/20 Arm Left (Active)   Number of days: 183       Wound 02/15/21 Heel Left (Active)   Number of days: 133       Wound 02/15/21 Heel Right (Active)   Number of days: 133       Wound 03/08/21 Heel Left #1 left heel aquired 12/1/20 (Active)   Wound Image   06/21/21 0821   Dressing Status New dressing applied 06/21/21 0846   Wound Cleansed Cleansed with saline 06/21/21 0846   Dressing/Treatment Alginate;ABD;Dry dressing;Roll gauze 06/21/21 0846   Offloading for Diabetic Foot Ulcers Post op shoe 06/21/21 0846   Wound Length (cm) 4.1 cm 06/28/21 0838   Wound Width (cm) 2.8 cm 06/28/21 0838   Wound Depth (cm) 0.5 cm 06/28/21 0838   Wound Surface Area (cm^2) 11.48 cm^2 06/28/21 0838   Change in Wound Size % (l*w) 5.05 06/28/21 0838   Wound Volume (cm^3) 5.74 cm^3 06/28/21 0838   Wound Healing % 32 06/28/21 0838   Post-Procedure Length (cm) 4.2 cm 06/21/21 0839   Post-Procedure Width (cm) 3 cm 06/21/21 0839   Post-Procedure Depth (cm) 0.5 cm 06/21/21 0839   Post-Procedure Surface Area (cm^2) 12.6 cm^2 06/21/21 0839   Post-Procedure Volume (cm^3) 6.3 cm^3 06/21/21 0839   Wound Assessment Pale granulation tissue;Fibrin;Slough 06/28/21 0838   Drainage Amount Large 06/28/21 0838   Drainage Description Serosanguinous; Serous; Yellow 06/28/21 0838   Odor None 06/28/21 0838   Adrianne-wound Assessment Maceration 06/28/21 0838   Number of days: 111       Wound 03/08/21 Heel Right #2 rt heel aquired 12/1/20 (Active)   Wound Image   04/05/21 4508 Dressing Status New dressing applied 03/18/21 1352   Wound Cleansed Cleansed with saline 04/01/21 1536   Dressing/Treatment Alginate;Collagen;Silicone border 03/27/40 1536   Offloading for Diabetic Foot Ulcers Post op shoe 04/01/21 1536   Wound Length (cm) 0 cm 04/05/21 0929   Wound Width (cm) 0 cm 04/05/21 0929   Wound Depth (cm) 0 cm 04/05/21 0929   Wound Surface Area (cm^2) 0 cm^2 04/05/21 0929   Change in Wound Size % (l*w) 100 04/05/21 0929   Wound Volume (cm^3) 0 cm^3 04/05/21 0929   Wound Healing % 100 04/05/21 0929   Post-Procedure Length (cm) 0 cm 04/05/21 1011   Post-Procedure Width (cm) 0 cm 04/05/21 1011   Post-Procedure Depth (cm) 0 cm 04/05/21 1011   Post-Procedure Surface Area (cm^2) 0 cm^2 04/05/21 1011   Post-Procedure Volume (cm^3) 0 cm^3 04/05/21 1011   Wound Assessment Fibrin 04/01/21 1357   Drainage Amount None 04/01/21 1357   Drainage Description Yellow 03/22/21 0911   Odor None 04/01/21 1357   Adrianne-wound Assessment Maceration 04/01/21 1357   Number of days: 111     Incision 02/19/21 Groin Right (Active)   Number of days: 128       Procedure Note  Indications:  Based on my examination of this patient's wound(s)/ulcer(s) today, debridement is required to promote healing and evaluate the wound base. Performed by: Benitez De Dios DPM    Consent obtained:  Yes    Time out taken:  Yes    Pain Control: Anesthetic  Anesthetic: 4% Lidocaine Liquid Topical     Debridement:Excisional Debridement    Using curette the wound(s)/ulcer(s) was/were sharply debrided down through and including the removal of subcutaneous tissue.         Devitalized Tissue Debrided:  slough to stimulate bleeding to promote healing, post debridement good bleeding base and wound edges noted    Wound/Ulcer #: 2    Percent of Wound/Ulcer Debrided: 100%    Total Surface Area Debrided:  11.48 sq cm     Estimated Blood Loss:  None  Hemostasis Achieved:  by pressure    Procedural Pain:  2  / 10   Post Procedural Pain:  2 / 10 Response to treatment:  Well tolerated by patient. A culture was done. Plan:     Pt is a smoker   - Discussed relationship of smoking and negative affects on wound healing   - Emphasized importance of tobacco avoidace/cessation   - Script for nicotine patch given to patient   - Information regarding support groups for smoking cessation given    In my professional opinion and based off the information that is available at this time this patient has appropriate indication for HBO Therapy: Maybe    Treatment Note please see attached Discharge Instructions    Written patient dismissal instructions given to patient and signed by patient or POA.            Electronically signed by Kisha Horne DPM on 6/28/2021 at 8:53 AM

## 2021-06-28 NOTE — PLAN OF CARE
Problem: Wound:  Goal: Will show signs of wound healing; wound closure and no evidence of infection  Description: Will show signs of wound healing; wound closure and no evidence of infection  Outcome: Met This Shift     Problem: Blood Glucose:  Goal: Ability to maintain appropriate glucose levels will improve  Description: Ability to maintain appropriate glucose levels will improve  Outcome: Met This Shift     Problem: Pain:  Goal: Pain level will decrease  Description: Pain level will decrease  Outcome: Met This Shift  Goal: Control of acute pain  Description: Control of acute pain  Outcome: Met This Shift  Goal: Control of chronic pain  Description: Control of chronic pain  Outcome: Met This Shift

## 2021-07-12 ENCOUNTER — HOSPITAL ENCOUNTER (OUTPATIENT)
Dept: WOUND CARE | Age: 61
Discharge: HOME | End: 2021-07-12
Payer: COMMERCIAL

## 2021-07-12 VITALS
HEIGHT: 71 IN | WEIGHT: 182 LBS | SYSTOLIC BLOOD PRESSURE: 142 MMHG | BODY MASS INDEX: 25.48 KG/M2 | RESPIRATION RATE: 16 BRPM | HEART RATE: 72 BPM | DIASTOLIC BLOOD PRESSURE: 74 MMHG | TEMPERATURE: 98.9 F

## 2021-07-12 DIAGNOSIS — E11.628 DIABETIC FOOT INFECTION (HCC): Primary | ICD-10-CM

## 2021-07-12 DIAGNOSIS — L08.9 DIABETIC FOOT INFECTION (HCC): Primary | ICD-10-CM

## 2021-07-12 PROCEDURE — 11042 DBRDMT SUBQ TIS 1ST 20SQCM/<: CPT

## 2021-07-12 PROCEDURE — 6370000000 HC RX 637 (ALT 250 FOR IP): Performed by: PODIATRIST

## 2021-07-12 RX ORDER — LIDOCAINE HYDROCHLORIDE 20 MG/ML
JELLY TOPICAL ONCE
Status: CANCELLED | OUTPATIENT
Start: 2021-07-12 | End: 2021-07-12

## 2021-07-12 RX ORDER — BACITRACIN, NEOMYCIN, POLYMYXIN B 400; 3.5; 5 [USP'U]/G; MG/G; [USP'U]/G
OINTMENT TOPICAL ONCE
Status: CANCELLED | OUTPATIENT
Start: 2021-07-12 | End: 2021-07-12

## 2021-07-12 RX ORDER — LIDOCAINE 50 MG/G
OINTMENT TOPICAL ONCE
Status: CANCELLED | OUTPATIENT
Start: 2021-07-12 | End: 2021-07-12

## 2021-07-12 RX ORDER — LIDOCAINE HYDROCHLORIDE 40 MG/ML
SOLUTION TOPICAL ONCE
Status: COMPLETED | OUTPATIENT
Start: 2021-07-12 | End: 2021-07-12

## 2021-07-12 RX ORDER — LIDOCAINE HYDROCHLORIDE 40 MG/ML
SOLUTION TOPICAL ONCE
Status: CANCELLED | OUTPATIENT
Start: 2021-07-12 | End: 2021-07-12

## 2021-07-12 RX ORDER — CLOBETASOL PROPIONATE 0.5 MG/G
OINTMENT TOPICAL ONCE
Status: CANCELLED | OUTPATIENT
Start: 2021-07-12 | End: 2021-07-12

## 2021-07-12 RX ORDER — GENTAMICIN SULFATE 1 MG/G
OINTMENT TOPICAL ONCE
Status: CANCELLED | OUTPATIENT
Start: 2021-07-12 | End: 2021-07-12

## 2021-07-12 RX ORDER — BACITRACIN ZINC AND POLYMYXIN B SULFATE 500; 1000 [USP'U]/G; [USP'U]/G
OINTMENT TOPICAL ONCE
Status: CANCELLED | OUTPATIENT
Start: 2021-07-12 | End: 2021-07-12

## 2021-07-12 RX ORDER — BETAMETHASONE DIPROPIONATE 0.05 %
OINTMENT (GRAM) TOPICAL ONCE
Status: CANCELLED | OUTPATIENT
Start: 2021-07-12 | End: 2021-07-12

## 2021-07-12 RX ORDER — GINSENG 100 MG
CAPSULE ORAL ONCE
Status: CANCELLED | OUTPATIENT
Start: 2021-07-12 | End: 2021-07-12

## 2021-07-12 RX ORDER — LIDOCAINE 40 MG/G
CREAM TOPICAL ONCE
Status: CANCELLED | OUTPATIENT
Start: 2021-07-12 | End: 2021-07-12

## 2021-07-12 RX ADMIN — LIDOCAINE HYDROCHLORIDE: 40 SOLUTION TOPICAL at 14:59

## 2021-07-12 NOTE — PROGRESS NOTES
off on partial calcanectomy. FU 1 week. Patient gave me consent (verbal) to speak with his brother Smiley Feldman regarding his at home arrangement. HBOT is on hold until patient is at home. 7/12/21 -  Alginate and add gentamicin ointment QD. HBOT referral.  Patient considering partial calcanectomy with flap. FU 1 week.       Wound/Ulcer Pain Timing/Severity: none  Quality of pain: N/A  Severity:  0 / 10   Modifying Factors: None  Associated Signs/Symptoms: none    Ulcer Identification:  Ulcer Type: arterial and diabetic  Contributing Factors: diabetes    Diabetic/Pressure/Non Pressure Ulcers onl y:  Ulcer: Diabetic ulcer, fat layer exposed    If patient has diabetic lower extremity wounds  Kong Classification of diabetic lower extremity wounds:    Grade Description   []  0 No open wound   []  1 Superficial ulcer involving the full skin thickness   [x]  2 Deep ulcer involves ligament, tendon, joint capsule, or fascia  No bone involvement or abscess presence   []  3 Deep Ulcer with abcess formation and/or osteomyelitis   []  4 Localized gangrene   []  5 Extensive gangrene of the foot     Wound: N/A        PAST MEDICAL HISTORY      Diagnosis Date    [facility]erebral artery occlusion [facility] infarction [facility]     Diabetes mellitus ([facility]     Type 2    Hemiparesis affecting left side as late effect of cerebrovascular accident ([facility] [facility]     LEFT SIDE NON DOMINANT FOLLOWING STROKE    Hypertension      Past Surgical History:   Procedure Laterality Date    FINGER AMPUTATION      FOOT DEBRIDEMENT Left 2/16/2021    LEFT FOOT DEBRIDEMENT WITH BONE BIOPSY, WOUND VAC APPLICATION performed by Sven Esteban DPM at [place] [address] ARTHROSCOPY      TONSILLECTOMY      TRANSESOPHAGEAL ECHOCARDIOGRAM N/A 2/22/2021    TRANSESOPHAGEAL ECHOCARDIOGRAM WITH BUBBLE STUDY performed by Edwin Waller MD at [address] [place] [address] N/A 1/4/2021    EGD BIOPSY performed by Katya Remy DO at [place] [address] History reviewed. No pertinent family history. Social History     Tobacco Use    Smoking status: Former Smoker    Smokeless tobacco: Never Used   Vaping Use    Vaping Use: Never used   Substance Use Topics    Alcohol use: Yes     Comment: every day drinker for most of adult life    Drug use: No     Allergies   Allergen Reactions    Pcn [Penicillins]      Current Outpatient Medications on File Prior to Encounter   Medication Sig Dispense Refill    acetaminophen (TYLENOL) 325 MG tablet Take 650 mg by mouth every 6 hours as needed for Pain      gabapentin (NEURONTIN) 100 MG capsule Take 100 mg by mouth 3 times daily.  doxycycline hyclate (VIBRAMYCIN) 100 MG capsule Take 100 mg by mouth 2 times daily      B Complex-C-E-Zn (STRESS B/ZINC) TABS Take 1 tablet by mouth daily      bisacodyl (DULCOLAX) 10 MG suppository Place 10 mg rectally daily      ferrous sulfate (IRON 325) 325 (65 Fe) MG tablet Take 325 mg by mouth daily (with breakfast)      magnesium hydroxide (MILK OF MAGNESIA CONCENTRATE) 2400 MG/10ML SUSP Take 30 mLs by mouth daily as needed      Sodium Chloride Flush (SALINE FLUSH IV) Infuse intravenously 2 times daily Flush picc      collagenase 250 UNIT/GM ointment Apply topically daily Apply topically daily.  ascorbic acid (VITAMIN C) 500 MG tablet Take 500 mg by mouth daily      Cholecalciferol (VITAMIN D3) 1.25 MG (12193 UT) CAPS Take by mouth      zinc gluconate 50 MG tablet Take 50 mg by mouth daily      glucose (GLUTOSE) 40 % GEL Take 37.5 mLs by mouth as needed (hypoglycemia) 45 g 1    glucagon, rDNA, 1 MG injection Inject 1 mg into the muscle as needed for Low blood sugar (Blood glucose less than 70 mg/dL and patient NOT ALERT or NPO and does not have IV access. ) 1 each 0    vitamin D (ERGOCALCIFEROL) 1.25 MG (83797 UT) CAPS capsule Take 1 capsule by mouth once a week 5 capsule 0    ipratropium-albuterol (DUONEB) 0.5-2.5 (3) MG/3ML SOLN nebulizer solution Inhale 3 mLs into the lungs every 4 hours 360 mL 0    pantoprazole (PROTONIX) 40 MG tablet Take 1 tablet by mouth every morning (before breakfast) 30 tablet 3    melatonin 3 MG TABS tablet Take 9 mg by mouth nightly as needed      aspirin 81 MG EC tablet Take 81 mg by mouth daily      hydroCHLOROthiazide (HYDRODIURIL) 25 MG tablet Take 25 mg by mouth daily      glimepiride (AMARYL) 2 MG tablet Take 1 tablet by mouth daily (with breakfast) 30 tablet 3    insulin lispro (HUMALOG) 100 UNIT/ML injection vial Inject 0-12 Units into the skin 3 times daily (with meals) 1 vial 3    insulin lispro (HUMALOG) 100 UNIT/ML injection vial Inject 0-6 Units into the skin nightly 1 vial 3    metFORMIN (GLUCOPHAGE) 500 MG tablet Take 1 tablet by mouth 2 times daily (with meals) 60 tablet 3    atorvastatin (LIPITOR) 40 MG tablet Take 1 tablet by mouth nightly 30 tablet 3    clopidogrel (PLAVIX) 75 MG tablet Take 1 tablet by mouth daily 30 tablet 3    vitamin B-1 100 MG tablet Take 1 tablet by mouth daily 30 tablet 3     No current facility-administered medications on file prior to encounter.        REVIEW OF SYSTEMS   ROS : All others Negative if blank [], Positive if [x]  General Vascular   [] Fevers [] Claudication   [] Chills [] Rest Pain   Skin Neurologic   [x] Tissue Loss [x] Lower extremity neuropathy     Objective:    BP (!) 142/74   Pulse 72   Temp 98.9 °F (37.2 °C) (Temporal)   Resp 16   Ht 5' 11\" (1.803 m)   Wt 182 lb (82.6 kg)   BMI 25.38 kg/m²   Wt Readings from Last 3 Encounters:   07/12/21 182 lb (82.6 kg)   06/28/21 182 lb (82.6 kg)   06/17/21 182 lb (82.6 kg)       PHYSICAL EXAM   CONSTITUTIONAL:   Awake, alert, cooperative   PSYCHIATRIC :  Oriented to time, place and person      normal insight to disease process  EXTREMITIES:   R LE Open wounds are noted   Skin color is normal   Edema is not noted   Sensation deficit noted - to foot   Palpation of the foot does cause pain   5/5 strength DF/PF  L LE Open wounds are noted   Skin color is abnormal with hyperpigmentation   Edema is not noted   Sensation deficit noted - bilateral feet   Palpation of the foot does cause pain   5/5 strength DF/PF  R dorsalis pedis +2 L dorsalis pedis +2   R posterior tibial +2 L posterior tibial +2     Assessment:     Problem List Items Addressed This Visit     Diabetic foot infection ([address] - Primary    Relevant Orders    Initiate Outpatient Wound Care Protocol          Pre Debridement Measurements:  Are located in the [facility]  Documentation Flow Sheet  Post Debridement Measurements:  Wound/Ulcer Descriptions are Pre Debridement except measurements:     Wound 12/26/20 Arm Left (Active)   Number of days: 197       Wound 02/15/21 Heel Left (Active)   Number of days: 147       Wound 02/15/21 Heel Right (Active)   Number of days: 147       Wound 03/08/21 Heel Left #1 left heel aquired 12/1/20 (Active)   Wound Image   06/21/21 0821   Dressing Status New dressing applied 06/28/21 0911   Wound Cleansed Cleansed with saline 06/28/21 0911   Dressing/Treatment Alginate;ABD;Roll gauze 06/28/21 0911   Offloading for Diabetic Foot Ulcers Post op shoe 06/28/21 0911   Wound Length (cm) 4.8 cm 07/12/21 1454   Wound Width (cm) 3.1 cm 07/12/21 1454   Wound Depth (cm) 0.6 cm 07/12/21 1454   Wound Surface Area (cm^2) 14.88 cm^2 07/12/21 1454   Change in Wound Size % (l*w) -23.08 07/12/21 1454   Wound Volume (cm^3) 8.928 cm^3 07/12/21 1454   Wound Healing % -6 07/12/21 1454   Post-Procedure Length (cm) 4.8 cm 07/12/21 1507   Post-Procedure Width (cm) 3.1 cm 07/12/21 1507   Post-Procedure Depth (cm) 0.6 cm 07/12/21 1507   Post-Procedure Surface Area (cm^2) 14.88 cm^2 07/12/21 1507   Post-Procedure Volume (cm^3) 8.928 cm^3 07/12/21 1507   Wound Assessment Pale granulation tissue;Fibrin;Slough 07/12/21 1454   Drainage Amount Large 07/12/21 1454   Drainage Description Serous; Yellow 07/12/21 1454   Odor None 07/12/21 1454   Adrianne-wound Assessment Maceration 07/12/21 5967 Number of days: 126       Wound 03/08/21 Heel Right #2 rt heel aquired 12/1/20 (Active)   Wound Image   04/05/21 0929   Dressing Status New dressing applied 03/18/21 1352   Wound Cleansed Cleansed with saline 04/01/21 1536   Dressing/Treatment Alginate;Collagen;Silicone border 24/48/82 1536   Offloading for Diabetic Foot Ulcers Post op shoe 04/01/21 1536   Wound Length (cm) 0 cm 04/05/21 0929   Wound Width (cm) 0 cm 04/05/21 0929   Wound Depth (cm) 0 cm 04/05/21 0929   Wound Surface Area (cm^2) 0 cm^2 04/05/21 0929   Change in Wound Size % (l*w) 100 04/05/21 0929   Wound Volume (cm^3) 0 cm^3 04/05/21 0929   Wound Healing % 100 04/05/21 0929   Post-Procedure Length (cm) 0 cm 04/05/21 1011   Post-Procedure Width (cm) 0 cm 04/05/21 1011   Post-Procedure Depth (cm) 0 cm 04/05/21 1011   Post-Procedure Surface Area (cm^2) 0 cm^2 04/05/21 1011   Post-Procedure Volume (cm^3) 0 cm^3 04/05/21 1011   Wound Assessment Fibrin 04/01/21 1357   Drainage Amount None 04/01/21 1357   Drainage Description Yellow 03/22/21 0911   Odor None 04/01/21 1357   Adrianne-wound Assessment Maceration 04/01/21 1357   Number of days: 126     Incision 02/19/21 Groin Right (Active)   Number of days: 143       Procedure Note  Indications:  Based on my examination of this patient's wound(s)/ulcer(s) today, debridement is required to promote healing and evaluate the wound base. Performed by: Luciano Potter DPM    Consent obtained:  Yes    Time out taken:  Yes    Pain Control: Anesthetic  Anesthetic: 4% Lidocaine Liquid Topical     Debridement:Excisional Debridement    Using curette the wound(s)/ulcer(s) was/were sharply debrided down through and including the removal of subcutaneous tissue.         Devitalized Tissue Debrided:  slough to stimulate bleeding to promote healing, post debridement good bleeding base and wound edges noted    Wound/Ulcer #: 2    Percent of Wound/Ulcer Debrided: 100%    Total Surface Area Debrided:  14.88 sq cm     Estimated Blood Loss:  None  Hemostasis Achieved:  by pressure    Procedural Pain:  2  / 10   Post Procedural Pain:  2 / 10     Response to treatment:  Well tolerated by patient. A culture was done. Plan:     Pt is a smoker   - Discussed relationship of smoking and negative affects on wound healing   - Emphasized importance of tobacco avoidace/cessation   - Script for nicotine patch given to patient   - Information regarding support groups for smoking cessation given    In my professional opinion and based off the information that is available at this time this patient has appropriate indication for HBO Therapy: Maybe    Treatment Note please see attached Discharge Instructions    Written patient dismissal instructions given to patient and signed by patient or POA.            Electronically signed by Gretchen Fierro DPM on 7/12/2021 at 3:18 PM

## 2021-07-15 ENCOUNTER — HOSPITAL ENCOUNTER (OUTPATIENT)
Dept: WOUND CARE | Age: 61
Discharge: HOME | End: 2021-07-15
Payer: COMMERCIAL

## 2021-07-15 VITALS
WEIGHT: 182 LBS | BODY MASS INDEX: 25.48 KG/M2 | TEMPERATURE: 97.4 F | SYSTOLIC BLOOD PRESSURE: 118 MMHG | HEART RATE: 70 BPM | HEIGHT: 71 IN | RESPIRATION RATE: 20 BRPM | DIASTOLIC BLOOD PRESSURE: 70 MMHG

## 2021-07-15 DIAGNOSIS — L08.9 DIABETIC FOOT INFECTION (HCC): Primary | ICD-10-CM

## 2021-07-15 DIAGNOSIS — E11.628 DIABETIC FOOT INFECTION (HCC): Primary | ICD-10-CM

## 2021-07-15 PROCEDURE — 99213 OFFICE O/P EST LOW 20 MIN: CPT

## 2021-07-15 RX ORDER — LIDOCAINE 40 MG/G
CREAM TOPICAL ONCE
Status: CANCELLED | OUTPATIENT
Start: 2021-07-15 | End: 2021-07-15

## 2021-07-15 RX ORDER — GINSENG 100 MG
CAPSULE ORAL ONCE
Status: CANCELLED | OUTPATIENT
Start: 2021-07-15 | End: 2021-07-15

## 2021-07-15 RX ORDER — CLOBETASOL PROPIONATE 0.5 MG/G
OINTMENT TOPICAL ONCE
Status: CANCELLED | OUTPATIENT
Start: 2021-07-15 | End: 2021-07-15

## 2021-07-15 RX ORDER — LIDOCAINE HYDROCHLORIDE 20 MG/ML
JELLY TOPICAL ONCE
Status: CANCELLED | OUTPATIENT
Start: 2021-07-15 | End: 2021-07-15

## 2021-07-15 RX ORDER — LIDOCAINE 50 MG/G
OINTMENT TOPICAL ONCE
Status: CANCELLED | OUTPATIENT
Start: 2021-07-15 | End: 2021-07-15

## 2021-07-15 RX ORDER — BACITRACIN, NEOMYCIN, POLYMYXIN B 400; 3.5; 5 [USP'U]/G; MG/G; [USP'U]/G
OINTMENT TOPICAL ONCE
Status: CANCELLED | OUTPATIENT
Start: 2021-07-15 | End: 2021-07-15

## 2021-07-15 RX ORDER — BETAMETHASONE DIPROPIONATE 0.05 %
OINTMENT (GRAM) TOPICAL ONCE
Status: CANCELLED | OUTPATIENT
Start: 2021-07-15 | End: 2021-07-15

## 2021-07-15 RX ORDER — LIDOCAINE HYDROCHLORIDE 40 MG/ML
SOLUTION TOPICAL ONCE
Status: CANCELLED | OUTPATIENT
Start: 2021-07-15 | End: 2021-07-15

## 2021-07-15 RX ORDER — BACITRACIN ZINC AND POLYMYXIN B SULFATE 500; 1000 [USP'U]/G; [USP'U]/G
OINTMENT TOPICAL ONCE
Status: CANCELLED | OUTPATIENT
Start: 2021-07-15 | End: 2021-07-15

## 2021-07-15 RX ORDER — GENTAMICIN SULFATE 1 MG/G
OINTMENT TOPICAL ONCE
Status: CANCELLED | OUTPATIENT
Start: 2021-07-15 | End: 2021-07-15

## 2021-07-15 ASSESSMENT — PAIN SCALES - GENERAL: PAINLEVEL_OUTOF10: 0

## 2021-07-15 NOTE — PROGRESS NOTES
[facility] Followup Visit Note  Referring Physician : Maria Alejandra Burrell MD  [id number] [facility] RECORD NUMBER:  [id number]  AGE: 63 y.o. GENDER: male  : 1960  EPISODE DATE:  7/15/2021  Subjective:     Chief Complaint   Patient presents with    Wound Check     left heel      HISTORY of PRESENT ILLNESS CHIQIUTA Shankar is a 63 y.o. male who presents today in regards to follow up evaluation and treatment of wound/ulcer. That patient's past medical, family and social hx were reviewed and changes were made if present. History of Wound Context:  Pt is known to ID s/p D/c from [facility] on  2021  Came in with 1. Left heel ulceration and necrosis of muscle. 2.  Peripheral arterial disease. 3. Acute osteomyelitis, left foot. 4. Cellulitis, left foot with abscess.     Current visit:  7/15/2021  NAD NO ISSUES WITH ATBX CIPRO/DOXY   LEFT HEEL WOUND SAME NO SIGNS OF INFECTION     2021  Has no c/o  On doxy  Was supposed to be on cipro also   Using gent ointment   vacc off    6/3/2021  A wound culture SHOWED MRSA  MRI SHOWED ACUTE OM/MYOSITIS/CELLULITS  FINISHED  LINEZOLID 600MG PO Q12  ON DOXY   RECX   NO F/C/N/V/D/  50% WEIGHT BEARING    2021  IN WC NAD NO C/O  NO PAIN LE   MRI NOTED   ON DOXY     2021  Still at ecf on doxycycline he has no isues with it  Left heel has some pain  He is asking about more weight bearing     Sp vancomycin (VANCOCIN) 1,000 mg  Q12H can stop today/2021  Here for f/u left post heel ulcer   Has wound vacc   Currently off has bone palpable  periwound inatct   has left >right ble swelling   No calf pain   Has no c/o  Rue picc without swelling or pain  Tolerating doxycyline informed side effects of photosensitivity     Bilateral iliac angiogram, left femoral  angiography, nitroglycerin infusion left common femoral artery 1000 mcg  x2 boluses, right common femoral artery to left popliteal artery  catheterization, PTA of the left SFA and popliteal artery with 4 x 80  RapidCross balloon, 5 x 300 Saber balloon, 5 x 220 Powerflex balloon, 6  x 100 Powerflex balloon and 6 x 250 IN. PACT Admiral balloon, completion  left femoral angiography, ProGlide closure right common femoral artery  access site. 2/16 1. Excision and debridement of the left heel ulceration to and through  level of deep fascia and muscle. Total measurement is 4.5 cm x 6.0 cm x  0.5 cm in dimension. 2.  Incision and drainage, left foot abscess. 3.  Bone biopsy, left foot. 4.  Application of wound VAC negative pressure therapy. cx Enterococcus  Path Bone biopsy left heel: Medullary bone, negative for osteomyelitis.     Most Recent   Organism   Date Value Ref Range Status   06/03/2021 Pseudomonas aeruginosa (A)  Final   06/03/2021 Staphylococcus aureus (A)  Final   06/03/2021 Staphylococcus coagulase-negative (A)  Final     PAST MEDICAL HISTORY      Diagnosis Date    Cerebral artery occlusion with cerebral infarction ([address]     Diabetes mellitus ([address]     Type 2    Hemiparesis affecting left side as late effect of cerebrovascular accident ([address]     LEFT SIDE NON DOMINANT FOLLOWING STROKE    Hypertension      Past Surgical History:   Procedure Laterality Date    FINGER AMPUTATION      FOOT DEBRIDEMENT Left 2/16/2021    LEFT FOOT DEBRIDEMENT WITH BONE BIOPSY, WOUND VAC APPLICATION performed by Christoph Bang DPM at [place] [address] ARTHROSCOPY      TONSILLECTOMY      TRANSESOPHAGEAL ECHOCARDIOGRAM N/A 2/22/2021    TRANSESOPHAGEAL ECHOCARDIOGRAM WITH BUBBLE STUDY performed by Didier Biswas MD at [facility] N/A 1/4/2021    EGD BIOPSY performed by Shi Vizcarra DO at [address]. No pertinent family history. Social History     Tobacco Use    Smoking status: Former Smoker    Smokeless tobacco: Never Used   Vaping Use    Vaping Use: Never used   Substance Use Topics    Alcohol use:  Yes tablet Take 81 mg by mouth daily      hydroCHLOROthiazide (HYDRODIURIL) 25 MG tablet Take 25 mg by mouth daily      glimepiride (AMARYL) 2 MG tablet Take 1 tablet by mouth daily (with breakfast) 30 tablet 3    insulin lispro (HUMALOG) 100 UNIT/ML injection vial Inject 0-12 Units into the skin 3 times daily (with meals) 1 vial 3    insulin lispro (HUMALOG) 100 UNIT/ML injection vial Inject 0-6 Units into the skin nightly 1 vial 3    metFORMIN (GLUCOPHAGE) 500 MG tablet Take 1 tablet by mouth 2 times daily (with meals) 60 tablet 3    atorvastatin (LIPITOR) 40 MG tablet Take 1 tablet by mouth nightly 30 tablet 3    clopidogrel (PLAVIX) 75 MG tablet Take 1 tablet by mouth daily 30 tablet 3    vitamin B-1 100 MG tablet Take 1 tablet by mouth daily 30 tablet 3     No current facility-administered medications on file prior to encounter.      REVIEW OF SYSTEMS See HPI  Objective:    /70   Pulse 70   Temp 97.4 °F (36.3 °C) (Temporal)   Resp 20   Ht 5' 11\" (1.803 m)   Wt 182 lb (82.6 kg)   BMI 25.38 kg/m²   Wt Readings from Last 3 Encounters:   07/15/21 182 lb (82.6 kg)   07/12/21 182 lb (82.6 kg)   06/28/21 182 lb (82.6 kg)     PHYSICAL EXAM  CONSTITUTIONAL:   Awake, alert,   HEENT: AT/NC glasses  HEART: S1/S2  RS: CTAB ant  ABD: SOFT NT/ND    EXT:  Trace EDEMA  SKIN: Open wound Present post heel wound bed  PALE      Wound Care Documentation:  Wound 12/26/20 Arm Left (Active)   Number of days: 123       Wound 02/15/21 Heel Left (Active)   Number of days: 73       Wound 02/15/21 Heel Right (Active)   Number of days: 73       Wound 03/08/21 Heel Left #1 left heel aquired 12/1/20 (Active)   Wound Image   03/22/21 0911   Dressing Status New dressing applied 04/19/21 1220   Wound Cleansed Cleansed with saline 04/19/21 1220   Dressing/Treatment Alginate;ABD 04/19/21 1220   Offloading for Diabetic Foot Ulcers Post op shoe 04/19/21 1220   Wound Length (cm) 4.2 cm 04/29/21 1302   Wound Width (cm) 3.2 cm 04/29/21 1302   Wound Depth (cm) 0.5 cm 04/29/21 1302   Wound Surface Area (cm^2) 13.44 cm^2 04/29/21 1302   Change in Wound Size % (l*w) -11.17 04/29/21 1302   Wound Volume (cm^3) 6.72 cm^3 04/29/21 1302   Wound Healing % 21 04/29/21 1302   Post-Procedure Length (cm) 4.5 cm 04/19/21 1132   Post-Procedure Width (cm) 2.9 cm 04/19/21 1132   Post-Procedure Depth (cm) 0.5 cm 04/19/21 1132   Post-Procedure Surface Area (cm^2) 13.05 cm^2 04/19/21 1132   Post-Procedure Volume (cm^3) 6.52 cm^3 04/19/21 1132   Wound Assessment Pale granulation tissue;Slough 04/29/21 1304   Drainage Amount Moderate 04/29/21 1304   Drainage Description Serosanguinous; Yellow 04/29/21 1304   Odor None 04/29/21 1304   Adrianne-wound Assessment Maceration 04/29/21 1304   Number of days: 52       Wound 03/08/21 Heel Right #2 rt heel aquired 12/1/20 (Active)   Wound Image   04/05/21 0929   Dressing Status New dressing applied 03/18/21 1352   Wound Cleansed Cleansed with saline 04/01/21 1536   Dressing/Treatment Alginate;Collagen;Silicone border 96/60/79 1536   Offloading for Diabetic Foot Ulcers Post op shoe 04/01/21 1536   Wound Length (cm) 0 cm 04/05/21 0929   Wound Width (cm) 0 cm 04/05/21 0929   Wound Depth (cm) 0 cm 04/05/21 0929   Wound Surface Area (cm^2) 0 cm^2 04/05/21 0929   Change in Wound Size % (l*w) 100 04/05/21 0929   Wound Volume (cm^3) 0 cm^3 04/05/21 0929   Wound Healing % 100 04/05/21 0929   Post-Procedure Length (cm) 0 cm 04/05/21 1011   Post-Procedure Width (cm) 0 cm 04/05/21 1011   Post-Procedure Depth (cm) 0 cm 04/05/21 1011   Post-Procedure Surface Area (cm^2) 0 cm^2 04/05/21 1011   Post-Procedure Volume (cm^3) 0 cm^3 04/05/21 1011   Wound Assessment Fibrin 04/01/21 1357   Drainage Amount None 04/01/21 1357   Drainage Description Yellow 03/22/21 0911   Odor None 04/01/21 1357   Adrianne-wound Assessment Maceration 04/01/21 1357   Number of days: 52         Assessment:   S/p rx MSSA bacteremia   BILATERAL HEEL PRESSURE ULCERS   NECROTIC WOUND, OSTEOMYELITIS, Cellulitis left foot with palpable bone   S/p LEFT FOOT DEBRIDEMENT WITH BONE BIOPSY, WOUND VAC APPLICATION  Bone biopsy left heel: Medullary bone, negative for osteomyelitis. S/p angio    UTI  - proteus s/p rx with cefepime  H/o vitamin D deficiency on suppleemnts    2/14 wound cx Mixed Gram positive organisms   Proteus species   TTE Summary No vegetations seen otherwise. Plan:   Cont  doxycyline 100mg po q12 as suppression    CONT CIPRO 500MG PO Q12  POSS FURTHER SURGERY BY PODIATRY   WOULD APPRECIATE NEW CULTURES THEN   Wound care     allergic to pcn   F/u 4 weeks     Orders Placed This Encounter   Procedures    Initiate Outpatient Wound Care Protocol     No orders of the defined types were placed in this encounter. Plan:   Treatment Note please see attached Discharge Instructions    Written patient dismissal instructions given to patient and signed by patient or POA.          Discharge Instructions         Discharge Instructions           Discharge condition: Stable     Assessment of pain at discharge: moderate     Anesthetic used: 4% liquid lidocaine solution      Discharge to: ECF     Left via:ambulance     Accompanied by: self     [facility]: [facility]    Dressing Orders:  Cleanse left heel ulcer with normal saline solution apply first gentamicin 0.1% ointment and cover with plain alginate and dry dressing and change every other day or as needed for drainage.  .      Treatment Orders:    Continue antibiotics per Dr Sherie Joyce and Cipro for suppression.  Wear prevalon boots or heel ayo while in bed.       May be partial weight bearing up to 50% on left with surgical shoe.      for HBO evaluation - please arrange-hold until out of facility.       Consider surgical debridement of infected bone in heel with skin graft.     [facility] followup visit: Dr Rosa Calixto after decision about surgery is made she will follow_______________________ 1 week Dr Harrell____________________________  (Please

## 2021-07-19 ENCOUNTER — HOSPITAL ENCOUNTER (OUTPATIENT)
Dept: WOUND CARE | Age: 61
Discharge: HOME | End: 2021-07-19
Payer: COMMERCIAL

## 2021-07-19 VITALS — DIASTOLIC BLOOD PRESSURE: 62 MMHG | HEART RATE: 78 BPM | SYSTOLIC BLOOD PRESSURE: 118 MMHG | RESPIRATION RATE: 18 BRPM

## 2021-07-19 DIAGNOSIS — L08.9 DIABETIC FOOT INFECTION (HCC): Primary | ICD-10-CM

## 2021-07-19 DIAGNOSIS — E11.628 DIABETIC FOOT INFECTION (HCC): Primary | ICD-10-CM

## 2021-07-19 PROCEDURE — 11042 DBRDMT SUBQ TIS 1ST 20SQCM/<: CPT

## 2021-07-19 PROCEDURE — 6370000000 HC RX 637 (ALT 250 FOR IP): Performed by: PODIATRIST

## 2021-07-19 RX ORDER — BETAMETHASONE DIPROPIONATE 0.05 %
OINTMENT (GRAM) TOPICAL ONCE
Status: CANCELLED | OUTPATIENT
Start: 2021-07-19 | End: 2021-07-19

## 2021-07-19 RX ORDER — LIDOCAINE HYDROCHLORIDE 20 MG/ML
JELLY TOPICAL ONCE
Status: CANCELLED | OUTPATIENT
Start: 2021-07-19 | End: 2021-07-19

## 2021-07-19 RX ORDER — BACITRACIN, NEOMYCIN, POLYMYXIN B 400; 3.5; 5 [USP'U]/G; MG/G; [USP'U]/G
OINTMENT TOPICAL ONCE
Status: CANCELLED | OUTPATIENT
Start: 2021-07-19 | End: 2021-07-19

## 2021-07-19 RX ORDER — BACITRACIN ZINC AND POLYMYXIN B SULFATE 500; 1000 [USP'U]/G; [USP'U]/G
OINTMENT TOPICAL ONCE
Status: CANCELLED | OUTPATIENT
Start: 2021-07-19 | End: 2021-07-19

## 2021-07-19 RX ORDER — GINSENG 100 MG
CAPSULE ORAL ONCE
Status: CANCELLED | OUTPATIENT
Start: 2021-07-19 | End: 2021-07-19

## 2021-07-19 RX ORDER — LIDOCAINE HYDROCHLORIDE 40 MG/ML
SOLUTION TOPICAL ONCE
Status: CANCELLED | OUTPATIENT
Start: 2021-07-19 | End: 2021-07-19

## 2021-07-19 RX ORDER — LIDOCAINE 50 MG/G
OINTMENT TOPICAL ONCE
Status: CANCELLED | OUTPATIENT
Start: 2021-07-19 | End: 2021-07-19

## 2021-07-19 RX ORDER — GENTAMICIN SULFATE 1 MG/G
OINTMENT TOPICAL ONCE
Status: CANCELLED | OUTPATIENT
Start: 2021-07-19 | End: 2021-07-19

## 2021-07-19 RX ORDER — LIDOCAINE HYDROCHLORIDE 40 MG/ML
SOLUTION TOPICAL ONCE
Status: COMPLETED | OUTPATIENT
Start: 2021-07-19 | End: 2021-07-19

## 2021-07-19 RX ORDER — LIDOCAINE 40 MG/G
CREAM TOPICAL ONCE
Status: CANCELLED | OUTPATIENT
Start: 2021-07-19 | End: 2021-07-19

## 2021-07-19 RX ORDER — CLOBETASOL PROPIONATE 0.5 MG/G
OINTMENT TOPICAL ONCE
Status: CANCELLED | OUTPATIENT
Start: 2021-07-19 | End: 2021-07-19

## 2021-07-19 RX ADMIN — LIDOCAINE HYDROCHLORIDE: 40 SOLUTION TOPICAL at 11:06

## 2021-07-19 ASSESSMENT — PAIN SCALES - GENERAL: PAINLEVEL_OUTOF10: 0

## 2021-07-19 NOTE — PROGRESS NOTES
Wound Healing Center  History and Physical/Consultation  Podiatry    Referring Physician : Liz Sanchez MD  [id number] [facility] RECORD NUMBER:  [id number]  AGE: 63 y.o. GENDER: male  : 1960  EPISODE DATE:  2021  Subjective:     Chief Complaint   Patient presents with    Wound Check     left lower extremity         HISTORY of PRESENT ILLNESS HPI     Jesse Casey is a 63 y.o. male who presents today for wound/ulcer evaluation. History of Wound Context:  The patient has had a wound of bilateral heels which was first noted approximately over a month ago. This has been treated with surgical debridement. On their initial visit to the wound healing center, 21 ,  the patient has noted that the wound has not been improving. The patient has had similar previous wounds in the past.      Pt is not on abx at time of initial visit. 3/22/21 - Wound VAC MWF continue to left heel. Aquacel Ag to right heel. Debrided toenails 1-5 in thickness and length. FU 1 week. IV Abx per Dr. Nikky Espitia. 21 - Wound VAC MWF to left heel. Cultures obtained. FU 1 week after visit with Dr. Nikky Espitia. PO Abx.  21 - DC wound VAC to left heel. Alginate to wound. FU 1 week  21 - Alginate and gentamicin ointment QOD. Partial WB to heel at 50% to foot with surgical shoe and walker. 5/3/21 - Alginate and gentamicin ointment QOD. MRI left heel    5/10/21 - Alginate and gentamicin ointment QOD. MRI left heel pending. 21 - Alginate and gentamicin ointment QOD. MRI reviewed confirming OM. Discussed HBOT referral and partial calcanectomy. He opts for HBOT consultation. 21 - Alginate and add gentamicin ointment QD. HBOT referral.  Patient hold off on partial calcanectomy. FU 1 week    21 - Alginate and add gentamicin ointment QD. HBOT referral.  Patient hold off on partial calcanectomy. FU 1 week     21 - Alginate and add gentamicin ointment QD.   HBOT referral. Patient hold off on partial calcanectomy. FU 1 week. Patient gave me consent (verbal) to speak with his brother Mic Aguilar regarding his at home arrangement. HBOT is on hold until patient is at home. 7/12/21 -  Alginate and add gentamicin ointment QD. HBOT referral.  Patient considering partial calcanectomy with flap. FU 1 week. 7/19/21 - Alginate and add gentamicin ointment QD. HBOT referral.  Patient considering partial calcanectomy with flap. FU 1 week.       Wound/Ulcer Pain Timing/Severity: none  Quality of pain: N/A  Severity:  0 / 10   Modifying Factors: None  Associated Signs/Symptoms: none    Ulcer Identification:  Ulcer Type: arterial and diabetic  Contributing Factors: diabetes    Diabetic/Pressure/Non Pressure Ulcers onl y:  Ulcer: Diabetic ulcer, fat layer exposed    If patient has diabetic lower extremity wounds  Kong Classification of diabetic lower extremity wounds:    Grade Description   []  0 No open wound   []  1 Superficial ulcer involving the full skin thickness   [x]  2 Deep ulcer involves ligament, tendon, joint capsule, or fascia  No bone involvement or abscess presence   []  3 Deep Ulcer with abcess formation and/or osteomyelitis   []  4 Localized gangrene   []  5 Extensive gangrene of the foot     Wound: N/A        PAST MEDICAL HISTORY      Diagnosis Date    Cerebral artery occlusion with cerebral infarction ([facility]     Diabetes mellitus ([address]     Type 2    Hemiparesis affecting left side as late effect of cerebrovascular accident ([address]     LEFT SIDE NON DOMINANT FOLLOWING STROKE    Hypertension      Past Surgical History:   Procedure Laterality Date    FINGER AMPUTATION      FOOT DEBRIDEMENT Left 2/16/2021    LEFT FOOT DEBRIDEMENT WITH BONE BIOPSY, WOUND VAC APPLICATION performed by Julissa Sun DPM at [place] [address] ARTHROSCOPY      TONSILLECTOMY      TRANSESOPHAGEAL ECHOCARDIOGRAM N/A 2/22/2021    TRANSESOPHAGEAL ECHOCARDIOGRAM WITH BUBBLE STUDY performed by [facility] Saniya Murry MD at [facility] N/A 1/4/2021    EGD BIOPSY performed by Shelby Palomo DO at [facility] reviewed. No pertinent family history. Social History     Tobacco Use    Smoking status: Former Smoker    Smokeless tobacco: Never Used   Vaping Use    Vaping Use: Never used   Substance Use Topics    Alcohol use: Yes     Comment: every day drinker for most of adult life    Drug use: No     Allergies   Allergen Reactions    Pcn [Penicillins]      Current Outpatient Medications on File Prior to Encounter   Medication Sig Dispense Refill    gabapentin (NEURONTIN) 100 MG capsule Take 100 mg by mouth 3 times daily.  doxycycline hyclate (VIBRAMYCIN) 100 MG capsule Take 100 mg by mouth 2 times daily      B Complex-C-E-Zn (STRESS B/ZINC) TABS Take 1 tablet by mouth daily      acetaminophen (TYLENOL) 325 MG tablet Take 650 mg by mouth every 6 hours as needed for Pain      bisacodyl (DULCOLAX) 10 MG suppository Place 10 mg rectally daily      ferrous sulfate (IRON 325) 325 (65 Fe) MG tablet Take 325 mg by mouth daily (with breakfast)      magnesium hydroxide (MILK OF MAGNESIA CONCENTRATE) 2400 MG/10ML SUSP Take 30 mLs by mouth daily as needed      Sodium Chloride Flush (SALINE FLUSH IV) Infuse intravenously 2 times daily Flush picc      collagenase 250 UNIT/GM ointment Apply topically daily Apply topically daily.  ascorbic acid (VITAMIN C) 500 MG tablet Take 500 mg by mouth daily      Cholecalciferol (VITAMIN D3) 1.25 MG (28041 UT) CAPS Take by mouth      zinc gluconate 50 MG tablet Take 50 mg by mouth daily      glucose (GLUTOSE) 40 % GEL Take 37.5 mLs by mouth as needed (hypoglycemia) 45 g 1    glucagon, rDNA, 1 MG injection Inject 1 mg into the muscle as needed for Low blood sugar (Blood glucose less than 70 mg/dL and patient NOT ALERT or NPO and does not have IV access. ) 1 each 0    vitamin D (ERGOCALCIFEROL) 1.25 MG (34711 UT) CAPS pain   5/5 strength DF/PF  L LE Open wounds are noted   Skin color is abnormal with hyperpigmentation   Edema is not noted   Sensation deficit noted - bilateral feet   Palpation of the foot does cause pain   5/5 strength DF/PF  R dorsalis pedis +2 L dorsalis pedis +2   R posterior tibial +2 L posterior tibial +2     Assessment:     Problem List Items Addressed This Visit     Diabetic foot infection ([address] - Primary    Relevant Orders    Initiate Outpatient Wound Care Protocol          Pre Debridement Measurements:  Are located in the [place]  Documentation Flow Sheet  Post Debridement Measurements:  Wound/Ulcer Descriptions are Pre Debridement except measurements:     Wound 12/26/20 Arm Left (Active)   Number of days: 204       Wound 02/15/21 Heel Left (Active)   Number of days: 154       Wound 02/15/21 Heel Right (Active)   Number of days: 154       Wound 03/08/21 Heel Left #1 left heel aquired 12/1/20 (Active)   Wound Image   07/19/21 1054   Dressing Status New dressing applied 07/15/21 1513   Wound Cleansed Cleansed with saline 07/15/21 1513   Dressing/Treatment Alginate;ABD;Roll gauze 07/15/21 1513   Offloading for Diabetic Foot Ulcers Post op shoe 07/15/21 1513   Wound Length (cm) 4.2 cm 07/19/21 1054   Wound Width (cm) 3 cm 07/19/21 1054   Wound Depth (cm) 0.7 cm 07/19/21 1054   Wound Surface Area (cm^2) 12.6 cm^2 07/19/21 1054   Change in Wound Size % (l*w) -4.22 07/19/21 1054   Wound Volume (cm^3) 8.82 cm^3 07/19/21 1054   Wound Healing % -4 07/19/21 1054   Post-Procedure Length (cm) 4.2 cm 07/19/21 1113   Post-Procedure Width (cm) 3 cm 07/19/21 1113   Post-Procedure Depth (cm) 0.7 cm 07/19/21 1113   Post-Procedure Surface Area (cm^2) 12.6 cm^2 07/19/21 1113   Post-Procedure Volume (cm^3) 8.82 cm^3 07/19/21 1113   Undermining Starts ___ O'Clock 6 07/19/21 1054   Undermining Ends___ O'Clock 3 07/19/21 1054   Undermining Maxium Distance (cm) 0.6 07/19/21 1054   Wound Assessment Pale granulation tissue;Fibrin;Slough 07/19/21 1054   Drainage Amount Large 07/19/21 1054   Drainage Description Yellow; Thick; Serosanguinous;Green 07/19/21 1054   Odor None 07/19/21 1054   Adrianne-wound Assessment Blanchable erythema; Hyperkeratosis (callous) 07/19/21 1054   Number of days: 133       Wound 03/08/21 Heel Right #2 rt heel aquired 12/1/20 (Active)   Wound Image   04/05/21 0929   Dressing Status New dressing applied 03/18/21 1352   Wound Cleansed Cleansed with saline 04/01/21 1536   Dressing/Treatment Alginate;Collagen;Silicone border 28/55/53 1536   Offloading for Diabetic Foot Ulcers Post op shoe 04/01/21 1536   Wound Length (cm) 0 cm 04/05/21 0929   Wound Width (cm) 0 cm 04/05/21 0929   Wound Depth (cm) 0 cm 04/05/21 0929   Wound Surface Area (cm^2) 0 cm^2 04/05/21 0929   Change in Wound Size % (l*w) 100 04/05/21 0929   Wound Volume (cm^3) 0 cm^3 04/05/21 0929   Wound Healing % 100 04/05/21 0929   Post-Procedure Length (cm) 0 cm 04/05/21 1011   Post-Procedure Width (cm) 0 cm 04/05/21 1011   Post-Procedure Depth (cm) 0 cm 04/05/21 1011   Post-Procedure Surface Area (cm^2) 0 cm^2 04/05/21 1011   Post-Procedure Volume (cm^3) 0 cm^3 04/05/21 1011   Wound Assessment Fibrin 04/01/21 1357   Drainage Amount None 04/01/21 1357   Drainage Description Yellow 03/22/21 0911   Odor None 04/01/21 1357   Adrianne-wound Assessment Maceration 04/01/21 1357   Number of days: 133     Incision 02/19/21 Groin Right (Active)   Number of days: 149       Procedure Note  Indications:  Based on my examination of this patient's wound(s)/ulcer(s) today, debridement is required to promote healing and evaluate the wound base. Performed by: Bakari Yu DPM    Consent obtained:  Yes    Time out taken:  Yes    Pain Control: Anesthetic  Anesthetic: 4% Lidocaine Liquid Topical     Debridement:Excisional Debridement    Using curette the wound(s)/ulcer(s) was/were sharply debrided down through and including the removal of subcutaneous tissue. Devitalized Tissue Debrided:  slough to stimulate bleeding to promote healing, post debridement good bleeding base and wound edges noted    Wound/Ulcer #: 2    Percent of Wound/Ulcer Debrided: 100%    Total Surface Area Debrided:  12.6 sq cm     Estimated Blood Loss:  None  Hemostasis Achieved:  by pressure    Procedural Pain:  2  / 10   Post Procedural Pain:  2 / 10     Response to treatment:  Well tolerated by patient. A culture was done. Plan:     Pt is a smoker   - Discussed relationship of smoking and negative affects on wound healing   - Emphasized importance of tobacco avoidace/cessation   - Script for nicotine patch given to patient   - Information regarding support groups for smoking cessation given    In my professional opinion and based off the information that is available at this time this patient has appropriate indication for HBO Therapy: Maybe    Treatment Note please see attached Discharge Instructions    Written patient dismissal instructions given to patient and signed by patient or POA.            Electronically signed by iNshi Viramontes DPM on 7/19/2021 at 11:26 AM

## 2021-07-26 ENCOUNTER — HOSPITAL ENCOUNTER (OUTPATIENT)
Dept: WOUND CARE | Age: 61
Discharge: HOME | End: 2021-07-26
Payer: COMMERCIAL

## 2021-08-02 ENCOUNTER — HOSPITAL ENCOUNTER (OUTPATIENT)
Dept: WOUND CARE | Age: 61
Discharge: HOME | End: 2021-08-02
Payer: COMMERCIAL

## 2021-08-02 VITALS
TEMPERATURE: 97.5 F | WEIGHT: 182 LBS | SYSTOLIC BLOOD PRESSURE: 124 MMHG | HEIGHT: 71 IN | BODY MASS INDEX: 25.48 KG/M2 | DIASTOLIC BLOOD PRESSURE: 62 MMHG | RESPIRATION RATE: 18 BRPM | HEART RATE: 64 BPM

## 2021-08-02 DIAGNOSIS — L08.9 DIABETIC FOOT INFECTION (HCC): Primary | ICD-10-CM

## 2021-08-02 DIAGNOSIS — E11.628 DIABETIC FOOT INFECTION (HCC): Primary | ICD-10-CM

## 2021-08-02 PROCEDURE — 6370000000 HC RX 637 (ALT 250 FOR IP): Performed by: PODIATRIST

## 2021-08-02 PROCEDURE — 11042 DBRDMT SUBQ TIS 1ST 20SQCM/<: CPT

## 2021-08-02 RX ORDER — LIDOCAINE HYDROCHLORIDE 40 MG/ML
SOLUTION TOPICAL ONCE
Status: CANCELLED | OUTPATIENT
Start: 2021-08-02 | End: 2021-08-02

## 2021-08-02 RX ORDER — BETAMETHASONE DIPROPIONATE 0.05 %
OINTMENT (GRAM) TOPICAL ONCE
Status: CANCELLED | OUTPATIENT
Start: 2021-08-02 | End: 2021-08-02

## 2021-08-02 RX ORDER — CLOBETASOL PROPIONATE 0.5 MG/G
OINTMENT TOPICAL ONCE
Status: CANCELLED | OUTPATIENT
Start: 2021-08-02 | End: 2021-08-02

## 2021-08-02 RX ORDER — BACITRACIN, NEOMYCIN, POLYMYXIN B 400; 3.5; 5 [USP'U]/G; MG/G; [USP'U]/G
OINTMENT TOPICAL ONCE
Status: CANCELLED | OUTPATIENT
Start: 2021-08-02 | End: 2021-08-02

## 2021-08-02 RX ORDER — LIDOCAINE HYDROCHLORIDE 40 MG/ML
SOLUTION TOPICAL ONCE
Status: COMPLETED | OUTPATIENT
Start: 2021-08-02 | End: 2021-08-02

## 2021-08-02 RX ORDER — GINSENG 100 MG
CAPSULE ORAL ONCE
Status: CANCELLED | OUTPATIENT
Start: 2021-08-02 | End: 2021-08-02

## 2021-08-02 RX ORDER — LIDOCAINE 40 MG/G
CREAM TOPICAL ONCE
Status: CANCELLED | OUTPATIENT
Start: 2021-08-02 | End: 2021-08-02

## 2021-08-02 RX ORDER — GENTAMICIN SULFATE 1 MG/G
OINTMENT TOPICAL ONCE
Status: CANCELLED | OUTPATIENT
Start: 2021-08-02 | End: 2021-08-02

## 2021-08-02 RX ORDER — LIDOCAINE 50 MG/G
OINTMENT TOPICAL ONCE
Status: CANCELLED | OUTPATIENT
Start: 2021-08-02 | End: 2021-08-02

## 2021-08-02 RX ORDER — LIDOCAINE HYDROCHLORIDE 20 MG/ML
JELLY TOPICAL ONCE
Status: CANCELLED | OUTPATIENT
Start: 2021-08-02 | End: 2021-08-02

## 2021-08-02 RX ORDER — BACITRACIN ZINC AND POLYMYXIN B SULFATE 500; 1000 [USP'U]/G; [USP'U]/G
OINTMENT TOPICAL ONCE
Status: CANCELLED | OUTPATIENT
Start: 2021-08-02 | End: 2021-08-02

## 2021-08-02 RX ADMIN — LIDOCAINE HYDROCHLORIDE 10 ML: 40 SOLUTION TOPICAL at 15:12

## 2021-08-02 ASSESSMENT — PAIN SCALES - GENERAL: PAINLEVEL_OUTOF10: 0

## 2021-08-02 NOTE — PROGRESS NOTES
Wound Healing Center  History and Physical/Consultation  Podiatry    Referring Physician : August Martinez MD  [address] [facility] RECORD NUMBER:  [id number]  AGE: 63 y.o. GENDER: male  : 1960  EPISODE DATE:  2021  Subjective:     Chief Complaint   Patient presents with    Wound Check     left foot         HISTORY of PRESENT ILLNESS HPI     Chacho Nowak is a 63 y.o. male who presents today for wound/ulcer evaluation. History of Wound Context:  The patient has had a wound of bilateral heels which was first noted approximately over a month ago. This has been treated with surgical debridement. On their initial visit to the wound healing center, 21 ,  the patient has noted that the wound has not been improving. The patient has had similar previous wounds in the past.      Pt is not on abx at time of initial visit. 3/22/21 - Wound VAC MWF continue to left heel. Aquacel Ag to right heel. Debrided toenails 1-5 in thickness and length. FU 1 week. IV Abx per Dr. Leticia Prince. 21 - Wound VAC MWF to left heel. Cultures obtained. FU 1 week after visit with Dr. Leticia Prince. PO Abx.  21 - DC wound VAC to left heel. Alginate to wound. FU 1 week  21 - Alginate and gentamicin ointment QOD. Partial WB to heel at 50% to foot with surgical shoe and walker. 5/3/21 - Alginate and gentamicin ointment QOD. MRI left heel    5/10/21 - Alginate and gentamicin ointment QOD. MRI left heel pending. 21 - Alginate and gentamicin ointment QOD. MRI reviewed confirming OM. Discussed HBOT referral and partial calcanectomy. He opts for HBOT consultation. 21 - Alginate and add gentamicin ointment QD. HBOT referral.  Patient hold off on partial calcanectomy. FU 1 week    21 - Alginate and add gentamicin ointment QD. HBOT referral.  Patient hold off on partial calcanectomy. FU 1 week     21 - Alginate and add gentamicin ointment QD.   HBOT referral.  Patient hold off on partial calcanectomy. FU 1 week. Patient gave me consent (verbal) to speak with his brother Whit Alicea regarding his at home arrangement. HBOT is on hold until patient is at home. 7/12/21 -  Alginate and add gentamicin ointment QD. HBOT referral.  Patient considering partial calcanectomy with flap. FU 1 week. 7/19/21 - Alginate and add gentamicin ointment QD. HBOT referral.  Patient considering partial calcanectomy with flap. FU 1 week. 8/2/21 - Alginate and add gentamicin ointment QD. HBOT referral.  Patient considering partial calcanectomy with flap. FU 1 week.       Wound/Ulcer Pain Timing/Severity: none  Quality of pain: N/A  Severity:  0 / 10   Modifying Factors: None  Associated Signs/Symptoms: none    Ulcer Identification:  Ulcer Type: arterial and diabetic  Contributing Factors: diabetes    Diabetic/Pressure/Non Pressure Ulcers onl y:  Ulcer: Diabetic ulcer, fat layer exposed    If patient has diabetic lower extremity wounds  Kong Classification of diabetic lower extremity wounds:    Grade Description   []  0 No open wound   []  1 Superficial ulcer involving the full skin thickness   [x]  2 Deep ulcer involves ligament, tendon, joint capsule, or fascia  No bone involvement or abscess presence   []  3 Deep Ulcer with abcess formation and/or osteomyelitis   []  4 Localized gangrene   []  5 Extensive gangrene of the foot     Wound: N/A        PAST MEDICAL HISTORY      Diagnosis Date    [facility]     Diabetes mellitus ([facility] [address]     Type 2    Hemiparesis affecting left side as late effect of cerebrovascular accident ([facility] [address]     LEFT SIDE NON DOMINANT FOLLOWING STROKE    Hypertension      Past Surgical History:   Procedure Laterality Date    FINGER AMPUTATION      FOOT DEBRIDEMENT Left 2/16/2021    LEFT FOOT DEBRIDEMENT WITH BONE BIOPSY, WOUND VAC APPLICATION performed by Rober Cordon DPM at [address] [employer] hydroCHLOROthiazide (HYDRODIURIL) 25 MG tablet Take 25 mg by mouth daily      glimepiride (AMARYL) 2 MG tablet Take 1 tablet by mouth daily (with breakfast) 30 tablet 3    insulin lispro (HUMALOG) 100 UNIT/ML injection vial Inject 0-12 Units into the skin 3 times daily (with meals) 1 vial 3    insulin lispro (HUMALOG) 100 UNIT/ML injection vial Inject 0-6 Units into the skin nightly 1 vial 3    metFORMIN (GLUCOPHAGE) 500 MG tablet Take 1 tablet by mouth 2 times daily (with meals) 60 tablet 3    atorvastatin (LIPITOR) 40 MG tablet Take 1 tablet by mouth nightly 30 tablet 3    clopidogrel (PLAVIX) 75 MG tablet Take 1 tablet by mouth daily 30 tablet 3    vitamin B-1 100 MG tablet Take 1 tablet by mouth daily 30 tablet 3    gabapentin (NEURONTIN) 100 MG capsule Take 100 mg by mouth 3 times daily.  magnesium hydroxide (MILK OF MAGNESIA CONCENTRATE) 2400 MG/10ML SUSP Take 30 mLs by mouth daily as needed      glucose (GLUTOSE) 40 % GEL Take 37.5 mLs by mouth as needed (hypoglycemia) 45 g 1    glucagon, rDNA, 1 MG injection Inject 1 mg into the muscle as needed for Low blood sugar (Blood glucose less than 70 mg/dL and patient NOT ALERT or NPO and does not have IV access. ) 1 each 0    melatonin 3 MG TABS tablet Take 9 mg by mouth nightly as needed       No current facility-administered medications on file prior to encounter.        REVIEW OF SYSTEMS   ROS : All others Negative if blank [], Positive if [x]  General Vascular   [] Fevers [] Claudication   [] Chills [] Rest Pain   Skin Neurologic   [x] Tissue Loss [x] Lower extremity neuropathy     Objective:    /62   Pulse 64   Temp 97.5 °F (36.4 °C) (Temporal)   Resp 18   Ht 5' 11\" (1.803 m)   Wt 182 lb (82.6 kg)   BMI 25.38 kg/m²   Wt Readings from Last 3 Encounters:   08/02/21 182 lb (82.6 kg)   07/15/21 182 lb (82.6 kg)   07/12/21 182 lb (82.6 kg)       PHYSICAL EXAM   CONSTITUTIONAL:   Awake, alert, cooperative   PSYCHIATRIC :  Oriented to time, 6.786 cm^3 08/02/21 1512   Undermining Starts ___ O'Clock 6 07/19/21 1054   Undermining Ends___ O'Clock 3 07/19/21 1054   Undermining Maxium Distance (cm) 0.6 07/19/21 1054   Wound Assessment Pale granulation tissue;Fibrin;Slough 08/02/21 1505   Drainage Amount Large 08/02/21 1505   Drainage Description Yellow; Thick; Serosanguinous;Green 08/02/21 1505   Odor None 08/02/21 1505   Adrianne-wound Assessment Blanchable erythema; Hyperkeratosis (callous) 08/02/21 1505   Number of days: 147       Wound 03/08/21 Heel Right #2 rt heel aquired 12/1/20 (Active)   Wound Image   04/05/21 0929   Dressing Status New dressing applied 03/18/21 1352   Wound Cleansed Cleansed with saline 04/01/21 1536   Dressing/Treatment Alginate;Collagen;Silicone border 37/43/35 1536   Offloading for Diabetic Foot Ulcers Post op shoe 04/01/21 1536   Wound Length (cm) 0 cm 04/05/21 0929   Wound Width (cm) 0 cm 04/05/21 0929   Wound Depth (cm) 0 cm 04/05/21 0929   Wound Surface Area (cm^2) 0 cm^2 04/05/21 0929   Change in Wound Size % (l*w) 100 04/05/21 0929   Wound Volume (cm^3) 0 cm^3 04/05/21 0929   Wound Healing % 100 04/05/21 0929   Post-Procedure Length (cm) 0 cm 04/05/21 1011   Post-Procedure Width (cm) 0 cm 04/05/21 1011   Post-Procedure Depth (cm) 0 cm 04/05/21 1011   Post-Procedure Surface Area (cm^2) 0 cm^2 04/05/21 1011   Post-Procedure Volume (cm^3) 0 cm^3 04/05/21 1011   Wound Assessment Fibrin 04/01/21 1357   Drainage Amount None 04/01/21 1357   Drainage Description Yellow 03/22/21 0911   Odor None 04/01/21 1357   Adrianne-wound Assessment Maceration 04/01/21 1357   Number of days: 147     Incision 02/19/21 Groin Right (Active)   Number of days: 164       Procedure Note  Indications:  Based on my examination of this patient's wound(s)/ulcer(s) today, debridement is required to promote healing and evaluate the wound base.     Performed by: Shaina Fritz DPM    Consent obtained:  Yes    Time out taken:  Yes    Pain Control: Anesthetic  Anesthetic: 4% Lidocaine Liquid Topical     Debridement:Excisional Debridement    Using curette the wound(s)/ulcer(s) was/were sharply debrided down through and including the removal of subcutaneous tissue. Devitalized Tissue Debrided:  slough to stimulate bleeding to promote healing, post debridement good bleeding base and wound edges noted    Wound/Ulcer #: 2    Percent of Wound/Ulcer Debrided: 100%    Total Surface Area Debrided:  11.31 sq cm     Estimated Blood Loss:  None  Hemostasis Achieved:  by pressure    Procedural Pain:  2  / 10   Post Procedural Pain:  2 / 10     Response to treatment:  Well tolerated by patient. A culture was done. Plan:     Pt is a smoker   - Discussed relationship of smoking and negative affects on wound healing   - Emphasized importance of tobacco avoidace/cessation   - Script for nicotine patch given to patient   - Information regarding support groups for smoking cessation given    In my professional opinion and based off the information that is available at this time this patient has appropriate indication for HBO Therapy: Maybe    Treatment Note please see attached Discharge Instructions    Written patient dismissal instructions given to patient and signed by patient or POA.            Electronically signed by Casandra Cui DPM on 8/2/2021 at 3:34 PM

## 2021-08-09 ENCOUNTER — HOSPITAL ENCOUNTER (OUTPATIENT)
Dept: WOUND CARE | Age: 61
Discharge: HOME | End: 2021-08-09
Payer: COMMERCIAL

## 2021-08-16 ENCOUNTER — HOSPITAL ENCOUNTER (OUTPATIENT)
Dept: WOUND CARE | Age: 61
Discharge: HOME | End: 2021-08-16
Payer: COMMERCIAL

## 2021-08-16 ENCOUNTER — HOSPITAL ENCOUNTER (EMERGENCY)
Age: 61
Discharge: HOME | End: 2021-08-17
Attending: STUDENT IN AN ORGANIZED HEALTH CARE EDUCATION/TRAINING PROGRAM
Payer: COMMERCIAL

## 2021-08-16 VITALS
RESPIRATION RATE: 18 BRPM | WEIGHT: 182 LBS | SYSTOLIC BLOOD PRESSURE: 112 MMHG | HEART RATE: 68 BPM | TEMPERATURE: 98.8 F | HEIGHT: 71 IN | BODY MASS INDEX: 25.48 KG/M2 | DIASTOLIC BLOOD PRESSURE: 60 MMHG

## 2021-08-16 DIAGNOSIS — T14.8XXA BLEEDING FROM WOUND: Primary | ICD-10-CM

## 2021-08-16 DIAGNOSIS — L08.9 DIABETIC FOOT INFECTION (HCC): Primary | ICD-10-CM

## 2021-08-16 DIAGNOSIS — E11.628 DIABETIC FOOT INFECTION (HCC): Primary | ICD-10-CM

## 2021-08-16 PROCEDURE — 6370000000 HC RX 637 (ALT 250 FOR IP): Performed by: STUDENT IN AN ORGANIZED HEALTH CARE EDUCATION/TRAINING PROGRAM

## 2021-08-16 PROCEDURE — 6370000000 HC RX 637 (ALT 250 FOR IP): Performed by: PODIATRIST

## 2021-08-16 PROCEDURE — 99285 EMERGENCY DEPT VISIT HI MDM: CPT

## 2021-08-16 PROCEDURE — 11042 DBRDMT SUBQ TIS 1ST 20SQCM/<: CPT

## 2021-08-16 RX ORDER — LIDOCAINE HYDROCHLORIDE 20 MG/ML
JELLY TOPICAL ONCE
Status: CANCELLED | OUTPATIENT
Start: 2021-08-16 | End: 2021-08-16

## 2021-08-16 RX ORDER — CLOBETASOL PROPIONATE 0.5 MG/G
OINTMENT TOPICAL ONCE
Status: CANCELLED | OUTPATIENT
Start: 2021-08-16 | End: 2021-08-16

## 2021-08-16 RX ORDER — BETAMETHASONE DIPROPIONATE 0.05 %
OINTMENT (GRAM) TOPICAL ONCE
Status: CANCELLED | OUTPATIENT
Start: 2021-08-16 | End: 2021-08-16

## 2021-08-16 RX ORDER — LIDOCAINE HYDROCHLORIDE 40 MG/ML
SOLUTION TOPICAL ONCE
Status: COMPLETED | OUTPATIENT
Start: 2021-08-16 | End: 2021-08-16

## 2021-08-16 RX ORDER — LIDOCAINE 50 MG/G
OINTMENT TOPICAL ONCE
Status: CANCELLED | OUTPATIENT
Start: 2021-08-16 | End: 2021-08-16

## 2021-08-16 RX ORDER — LIDOCAINE 40 MG/G
CREAM TOPICAL ONCE
Status: CANCELLED | OUTPATIENT
Start: 2021-08-16 | End: 2021-08-16

## 2021-08-16 RX ORDER — GENTAMICIN SULFATE 1 MG/G
OINTMENT TOPICAL ONCE
Status: CANCELLED | OUTPATIENT
Start: 2021-08-16 | End: 2021-08-16

## 2021-08-16 RX ORDER — BACITRACIN, NEOMYCIN, POLYMYXIN B 400; 3.5; 5 [USP'U]/G; MG/G; [USP'U]/G
OINTMENT TOPICAL ONCE
Status: CANCELLED | OUTPATIENT
Start: 2021-08-16 | End: 2021-08-16

## 2021-08-16 RX ORDER — BACITRACIN ZINC AND POLYMYXIN B SULFATE 500; 1000 [USP'U]/G; [USP'U]/G
OINTMENT TOPICAL ONCE
Status: CANCELLED | OUTPATIENT
Start: 2021-08-16 | End: 2021-08-16

## 2021-08-16 RX ORDER — GINSENG 100 MG
CAPSULE ORAL ONCE
Status: CANCELLED | OUTPATIENT
Start: 2021-08-16 | End: 2021-08-16

## 2021-08-16 RX ORDER — HYDROCODONE BITARTRATE AND ACETAMINOPHEN 5; 325 MG/1; MG/1
1 TABLET ORAL ONCE
Status: COMPLETED | OUTPATIENT
Start: 2021-08-16 | End: 2021-08-16

## 2021-08-16 RX ORDER — LIDOCAINE HYDROCHLORIDE 40 MG/ML
SOLUTION TOPICAL ONCE
Status: CANCELLED | OUTPATIENT
Start: 2021-08-16 | End: 2021-08-16

## 2021-08-16 RX ADMIN — LIDOCAINE HYDROCHLORIDE 10 ML: 40 SOLUTION TOPICAL at 16:02

## 2021-08-16 RX ADMIN — Medication 1 EACH: at 22:31

## 2021-08-16 RX ADMIN — HYDROCODONE BITARTRATE AND ACETAMINOPHEN 1 TABLET: 5; 325 TABLET ORAL at 22:31

## 2021-08-16 ASSESSMENT — PAIN DESCRIPTION - ORIENTATION: ORIENTATION: LEFT

## 2021-08-16 ASSESSMENT — PAIN SCALES - GENERAL
PAINLEVEL_OUTOF10: 7
PAINLEVEL_OUTOF10: 0

## 2021-08-16 ASSESSMENT — PAIN DESCRIPTION - PAIN TYPE: TYPE: ACUTE PAIN

## 2021-08-16 ASSESSMENT — PAIN DESCRIPTION - LOCATION: LOCATION: FOOT

## 2021-08-16 NOTE — PROGRESS NOTES
[facility]  History and Physical/Consultation  Podiatry    Referring Physician : Shilpi Sorto MD  [id number] [facility] RECORD NUMBER:  [id number]  AGE: 63 y.o. GENDER: male  : 1960  EPISODE DATE:  2021  Subjective:     Chief Complaint   Patient presents with    Wound Check     left foot         HISTORY of PRESENT ILLNESS HPI     Zuly Tatum is a 63 y.o. male who presents today for wound/ulcer evaluation. History of Wound Context:  The patient has had a wound of bilateral heels which was first noted approximately over a month ago. This has been treated with surgical debridement. On their initial visit to the wound healing center, 21 ,  the patient has noted that the wound has not been improving. The patient has had similar previous wounds in the past.      Pt is not on abx at time of initial visit. 3/22/21 - Wound VAC MWF continue to left heel. Aquacel Ag to right heel. Debrided toenails 1-5 in thickness and length. FU 1 week. IV Abx per Dr. Paula Schumacher. 21 - Wound VAC MWF to left heel. Cultures obtained. FU 1 week after visit with Dr. Paula Schumacher. PO Abx.  21 - DC wound VAC to left heel. Alginate to wound. FU 1 week  21 - Alginate and gentamicin ointment QOD. Partial WB to heel at 50% to foot with surgical shoe and walker. 5/3/21 - Alginate and gentamicin ointment QOD. MRI left heel    5/10/21 - Alginate and gentamicin ointment QOD. MRI left heel pending. 21 - Alginate and gentamicin ointment QOD. MRI reviewed confirming OM. Discussed HBOT referral and partial calcanectomy. He opts for HBOT consultation. 21 - Alginate and add gentamicin ointment QD. HBOT referral.  Patient hold off on partial calcanectomy. FU 1 week    21 - Alginate and add gentamicin ointment QD. HBOT referral.  Patient hold off on partial calcanectomy. FU 1 week     21 - Alginate and add gentamicin ointment QD.   HBOT referral.  Patient hold off on partial calcanectomy. FU 1 week. Patient gave me consent (verbal) to speak with his brother Juli Maloney regarding his at home arrangement. HBOT is on hold until patient is at home. 7/12/21 -  Alginate and add gentamicin ointment QD. HBOT referral.  Patient considering partial calcanectomy with flap. FU 1 week. 7/19/21 - Alginate and add gentamicin ointment QD. HBOT referral.  Patient considering partial calcanectomy with flap. FU 1 week. 8/2/21 - Alginate and add gentamicin ointment QD. HBOT referral.  Patient considering partial calcanectomy with flap. FU 1 week. 8/16/21 - Alginate and add gentamicin ointment QD. HBOT referral.  Patient considering partial calcanectomy with flap. FU 1 week.      Wound/Ulcer Pain Timing/Severity: none  Quality of pain: N/A  Severity:  0 / 10   Modifying Factors: None  Associated Signs/Symptoms: none    Ulcer Identification:  Ulcer Type: arterial and diabetic  Contributing Factors: diabetes    Diabetic/Pressure/Non Pressure Ulcers onl y:  Ulcer: Diabetic ulcer, fat layer exposed    If patient has diabetic lower extremity wounds  [place]ong Classification of diabetic lower extremity wounds:    Grade Description   []  0 No open wound   []  1 Superficial ulcer involving the full skin thickness   [x]  2 Deep ulcer involves ligament, tendon, joint capsule, or fascia  No bone involvement or abscess presence   []  3 Deep Ulcer with abcess formation and/or osteomyelitis   []  4 Localized gangrene   []  5 Extensive gangrene of the foot     Wound: N/A        PAST MEDICAL HISTORY      Diagnosis Date    Cerebral artery occlusion with [facility] [address] [address]     Diabetes mellitus ([facility] [address]     Type 2    Hemiparesis affecting left side as late effect of cerebrovascular accident ([address]     LEFT SIDE NON DOMINANT FOLLOWING STROKE    Hypertension      Past Surgical History:   Procedure Laterality Date    FINGER AMPUTATION      FOOT DEBRIDEMENT Left 2/16/2021    LEFT FOOT have IV access. ) 1 each 0    vitamin D (ERGOCALCIFEROL) 1.25 MG (85528 UT) CAPS capsule Take 1 capsule by mouth once a week 5 capsule 0    pantoprazole (PROTONIX) 40 MG tablet Take 1 tablet by mouth every morning (before breakfast) 30 tablet 3    melatonin 3 MG TABS tablet Take 9 mg by mouth nightly as needed      aspirin 81 MG EC tablet Take 81 mg by mouth daily      hydroCHLOROthiazide (HYDRODIURIL) 25 MG tablet Take 25 mg by mouth daily      glimepiride (AMARYL) 2 MG tablet Take 1 tablet by mouth daily (with breakfast) 30 tablet 3    insulin lispro (HUMALOG) 100 UNIT/ML injection vial Inject 0-12 Units into the skin 3 times daily (with meals) 1 vial 3    insulin lispro (HUMALOG) 100 UNIT/ML injection vial Inject 0-6 Units into the skin nightly 1 vial 3    metFORMIN (GLUCOPHAGE) 500 MG tablet Take 1 tablet by mouth 2 times daily (with meals) 60 tablet 3    atorvastatin (LIPITOR) 40 MG tablet Take 1 tablet by mouth nightly 30 tablet 3    clopidogrel (PLAVIX) 75 MG tablet Take 1 tablet by mouth daily 30 tablet 3    vitamin B-1 100 MG tablet Take 1 tablet by mouth daily 30 tablet 3    glucose (GLUTOSE) 40 % GEL Take 37.5 mLs by mouth as needed (hypoglycemia) 45 g 1    ipratropium-albuterol (DUONEB) 0.5-2.5 (3) MG/3ML SOLN nebulizer solution Inhale 3 mLs into the lungs every 4 hours 360 mL 0     No current facility-administered medications on file prior to encounter.        REVIEW OF SYSTEMS   ROS : All others Negative if blank [], Positive if [x]  General Vascular   [] Fevers [] Claudication   [] Chills [] Rest Pain   Skin Neurologic   [x] Tissue Loss [x] Lower extremity neuropathy     Objective:    /60   Pulse 68   Temp 98.8 °F (37.1 °C) (Temporal)   Resp 18   Ht 5' 11\" (1.803 m)   Wt 182 lb (82.6 kg)   BMI 25.38 kg/m²   Wt Readings from Last 3 Encounters:   08/16/21 182 lb (82.6 kg)   08/02/21 182 lb (82.6 kg)   07/15/21 182 lb (82.6 kg)       PHYSICAL EXAM   CONSTITUTIONAL:   Awake, alert, cooperative   PSYCHIATRIC :  Oriented to time, place and person      normal insight to disease process  EXTREMITIES:   R LE Open wounds are noted   Skin color is normal   Edema is not noted   Sensation deficit noted - to foot   Palpation of the foot does cause pain   5/5 strength DF/PF  L LE Open wounds are noted   Skin color is abnormal with hyperpigmentation   Edema is not noted   Sensation deficit noted - bilateral feet   Palpation of the foot does cause pain   5/5 strength DF/PF  R dorsalis pedis +2 L dorsalis pedis +2   R posterior tibial +2 L posterior tibial +2     Assessment:     Problem List Items Addressed This Visit     Diabetic foot infection ([address] - Primary    Relevant Orders    Initiate Outpatient Wound Care Protocol          Pre Debridement Measurements:  Are located in the [place]  Documentation Flow Sheet  Post Debridement Measurements:  Wound/Ulcer Descriptions are Pre Debridement except measurements:     Wound 12/26/20 Arm Left (Active)   Number of days: 232       Wound 02/15/21 Heel Left (Active)   Number of days: 182       Wound 02/15/21 Heel Right (Active)   Number of days: 182       Wound 03/08/21 Heel Left #1 left heel aquired 12/1/20 (Active)   Wound Image   07/19/21 1054   Dressing Status New dressing applied 08/02/21 1535   Wound Cleansed Cleansed with saline 08/02/21 1535   Dressing/Treatment Alginate;ABD;Roll gauze 08/02/21 1535   Offloading for Diabetic Foot Ulcers Post op shoe 08/02/21 1535   Wound Length (cm) 4.2 cm 08/16/21 1556   Wound Width (cm) 3 cm 08/16/21 1556   Wound Depth (cm) 0.5 cm 08/16/21 1556   Wound Surface Area (cm^2) 12.6 cm^2 08/16/21 1556   Change in Wound Size % (l*w) -4.22 08/16/21 1556   Wound Volume (cm^3) 6.3 cm^3 08/16/21 1556   Wound Healing % 26 08/16/21 1556   Post-Procedure Length (cm) 4.2 cm 08/16/21 1622   Post-Procedure Width (cm) 3 cm 08/16/21 1622   Post-Procedure Depth (cm) 0.5 cm 08/16/21 1622   Post-Procedure Surface Area (cm^2) 12.6 cm^2 08/16/21 1622   Post-Procedure Volume (cm^3) 6.3 cm^3 08/16/21 1622   Undermining Starts ___ O'Clock 6 07/19/21 1054   Undermining Ends___ O'Clock 3 07/19/21 1054   Undermining Maxium Distance (cm) 0.6 07/19/21 1054   Wound Assessment Fibrin;Pale granulation tissue 08/16/21 1556   Drainage Amount Moderate 08/16/21 1556   Drainage Description Yellow; Thick; Serosanguinous 08/16/21 1556   Odor None 08/16/21 1556   Adrianne-wound Assessment Blanchable erythema; Hyperkeratosis (callous) 08/16/21 1556   Number of days: 161       Wound 03/08/21 Heel Right #2 rt heel aquired 12/1/20 (Active)   Wound Image   04/05/21 0929   Dressing Status New dressing applied 03/18/21 1352   Wound Cleansed Cleansed with saline 04/01/21 1536   Dressing/Treatment Alginate;Collagen;Silicone border 46/44/48 1536   Offloading for Diabetic Foot Ulcers Post op shoe 04/01/21 1536   Wound Length (cm) 0 cm 04/05/21 0929   Wound Width (cm) 0 cm 04/05/21 0929   Wound Depth (cm) 0 cm 04/05/21 0929   Wound Surface Area (cm^2) 0 cm^2 04/05/21 0929   Change in Wound Size % (l*w) 100 04/05/21 0929   Wound Volume (cm^3) 0 cm^3 04/05/21 0929   Wound Healing % 100 04/05/21 0929   Post-Procedure Length (cm) 0 cm 04/05/21 1011   Post-Procedure Width (cm) 0 cm 04/05/21 1011   Post-Procedure Depth (cm) 0 cm 04/05/21 1011   Post-Procedure Surface Area (cm^2) 0 cm^2 04/05/21 1011   Post-Procedure Volume (cm^3) 0 cm^3 04/05/21 1011   Wound Assessment Fibrin 04/01/21 1357   Drainage Amount None 04/01/21 1357   Drainage Description Yellow 03/22/21 0911   Odor None 04/01/21 1357   Adrianne-wound Assessment Maceration 04/01/21 1357   Number of days: 161     Incision 02/19/21 Groin Right (Active)   Number of days: 178       Procedure Note  Indications:  Based on my examination of this patient's wound(s)/ulcer(s) today, debridement is required to promote healing and evaluate the wound base.     Performed by: Mike Vaz DPM    Consent obtained:  Yes    Time out taken:  Yes    Pain Control: Anesthetic  Anesthetic: 4% Lidocaine Liquid Topical     Debridement:Excisional Debridement    Using curette the wound(s)/ulcer(s) was/were sharply debrided down through and including the removal of subcutaneous tissue. Devitalized Tissue Debrided:  slough to stimulate bleeding to promote healing, post debridement good bleeding base and wound edges noted    Wound/Ulcer #: 2    Percent of Wound/Ulcer Debrided: 100%    Total Surface Area Debrided:  12.6 sq cm     Estimated Blood Loss:  None  Hemostasis Achieved:  by pressure    Procedural Pain:  2  / 10   Post Procedural Pain:  2 / 10     Response to treatment:  Well tolerated by patient. A culture was done. Plan:     Pt is a smoker   - Discussed relationship of smoking and negative affects on wound healing   - Emphasized importance of tobacco avoidace/cessation   - Script for nicotine patch given to patient   - Information regarding support groups for smoking cessation given    In my professional opinion and based off the information that is available at this time this patient has appropriate indication for HBO Therapy: Maybe    Treatment Note please see attached Discharge Instructions    Written patient dismissal instructions given to patient and signed by patient or POA.            Electronically signed by Lexie Mo DPM on 8/16/2021 at 4:23 PM

## 2021-08-17 VITALS
OXYGEN SATURATION: 96 % | HEIGHT: 71 IN | RESPIRATION RATE: 18 BRPM | WEIGHT: 207 LBS | TEMPERATURE: 98.9 F | BODY MASS INDEX: 28.98 KG/M2 | HEART RATE: 73 BPM | DIASTOLIC BLOOD PRESSURE: 87 MMHG | SYSTOLIC BLOOD PRESSURE: 156 MMHG

## 2021-08-17 ASSESSMENT — ENCOUNTER SYMPTOMS
SHORTNESS OF BREATH: 0
BACK PAIN: 0
COUGH: 0
NAUSEA: 0
VOMITING: 0
CHEST TIGHTNESS: 0
ABDOMINAL DISTENTION: 0
DIARRHEA: 0
ABDOMINAL PAIN: 0
PHOTOPHOBIA: 0

## 2021-08-17 ASSESSMENT — PAIN SCALES - GENERAL: PAINLEVEL_OUTOF10: 4

## 2021-08-17 NOTE — ED NOTES
Nurse to Nurse given to [place] at [facility]. All questions answered at this time.      Tigist Elias RN  08/17/21 [id number]

## 2021-08-17 NOTE — ED PROVIDER NOTES
Merlin Doss is a 63year old male with PMH of DM, CVA, PAD, who presented to ED with concern for bleeding from wound. Patient has a history of chronic wound to the left lower extremity. Patient goes to wound care and gets debridements done regularly. Patient went in today and debridement done. Patient did have some bleeding following debridement. Patient was wrapped in bandage and bleeding initially appeared to be controlled. Patient was sent back to facility where patient had worsening of bleeding. Patient was placed in a bandage and sent to emergency department for evaluation. Patient did not have any lightheadedness or dizziness or syncope. Patient denied chest pain or shortness of breath. Patient denied fever, chills, nausea, vomiting. Patient does have a history of MRSA but denies any infectious symptoms. Patient has tried medication pressure medicine just not any symptoms or worse symptoms mild in severity and constant. Symptoms have been present since about 5 PM today. Patient does take plavix but denies anticoagulation    The history is provided by the patient, medical records and the EMS personnel. Review of Systems   Constitutional: Negative for chills, diaphoresis, fatigue and fever. Eyes: Negative for photophobia and visual disturbance. Respiratory: Negative for cough, chest tightness and shortness of breath. Cardiovascular: Negative for chest pain, palpitations and leg swelling. Gastrointestinal: Negative for abdominal distention, abdominal pain, diarrhea, nausea and vomiting. Genitourinary: Negative for dysuria. Musculoskeletal: Negative for back pain, neck pain and neck stiffness. Skin: Positive for wound. Negative for pallor and rash. Neurological: Negative for headaches. Psychiatric/Behavioral: Negative for confusion. Physical Exam  Vitals and nursing note reviewed. Constitutional:       General: He is not in acute distress.      Appearance: Normal appearance. He is not ill-appearing. HENT:      Head: Normocephalic and atraumatic. Eyes:      General: No scleral icterus. Conjunctiva/sclera: Conjunctivae normal.      Pupils: Pupils are equal, round, and reactive to light. Cardiovascular:      Rate and Rhythm: Normal rate and regular rhythm. Pulmonary:      Effort: Pulmonary effort is normal.      Breath sounds: Normal breath sounds. Abdominal:      General: Bowel sounds are normal. There is no distension. Palpations: Abdomen is soft. Tenderness: There is no abdominal tenderness. There is no guarding or rebound. Musculoskeletal:      Cervical back: Normal range of motion and neck supple. No rigidity. No muscular tenderness. Right lower leg: No edema. Left lower leg: No edema. 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 Capillary Refill: Capillary refill takes less than 2 seconds. Coloration: Skin is not pale. Findings: No erythema or rash. Neurological:      General: No focal deficit present. Mental Status: He is alert and oriented to person, place, and time. Psychiatric:         Mood and Affect: Mood normal.          Procedures     MDM  Number of Diagnoses or Management Options  Bleeding from wound  Diagnosis management comments: Daly Mantilla is a 63year old male who presented to ED with concerns for bleeding wound. Patient was hemodynamically stable and did not have any complaints. Surgicel was placed on wound and wound was wrapped. Patient was observed and did not have recurrence of bleeding in ED. Patient advised to call Dr. Ledy Shah tomorrow for follow up and advised to return to ED if patient develops return of bleeding or symptoms including chest pain, shortness of breath, lightheadedness, dizziness, fever, chills. Patient is nontoxic and well appearing not in any distress. --------------------------------------------- PAST HISTORY ---------------------------------------------  Past Medical History:  has a past medical history of Cerebral artery occlusion with cerebral infarction ([facility] [address] Diabetes mellitus ([facility] [address] Hemiparesis affecting left side as late effect of cerebrovascular accident [facility]), and Hypertension. Past Surgical History:  has a past surgical history that includes Tonsillectomy; Knee arthroscopy; Finger amputation; Upper gastrointestinal endoscopy (N/A, 1/4/2021); Foot Debridement (Left, 2/16/2021); and transesophageal echocardiogram (N/A, 2/22/2021). Social History:  reports that he has quit smoking. He has never used smokeless tobacco. He reports previous alcohol use. He reports that he does not use drugs. Family History: family history is not on file. The patients home medications have been reviewed. Allergies: Pcn [penicillins]    -------------------------------------------------- RESULTS -------------------------------------------------  Labs:  No results found for this visit on 08/16/21. Radiology:  No orders to display       ------------------------- NURSING NOTES AND VITALS REVIEWED ---------------------------  Date / Time Roomed:  8/16/2021  7:53 PM  ED Bed Assignment:  01/01    The nursing notes within the ED encounter and vital signs as below have been reviewed. BP (!) 156/87   Pulse 73   Temp 98.9 °F (37.2 °C) (Oral)   Resp 18   Ht 5' 11\" (1.803 m)   Wt 207 lb (93.9 kg)   SpO2 96%   BMI 28.87 kg/m²   Oxygen Saturation Interpretation: Normal      ------------------------------------------ PROGRESS NOTES ------------------------------------------  8:55 AM EDT  I have spoken with the patient and discussed todays results, in addition to providing specific details for the plan of care and counseling regarding the diagnosis and prognosis. Their questions are answered at this time and they are agreeable with the plan.  I discussed at

## 2021-08-23 ENCOUNTER — HOSPITAL ENCOUNTER (OUTPATIENT)
Dept: WOUND CARE | Age: 61
Discharge: HOME | End: 2021-08-23
Payer: COMMERCIAL

## 2021-08-23 VITALS
TEMPERATURE: 99 F | SYSTOLIC BLOOD PRESSURE: 124 MMHG | HEIGHT: 71 IN | RESPIRATION RATE: 20 BRPM | DIASTOLIC BLOOD PRESSURE: 80 MMHG | HEART RATE: 88 BPM | WEIGHT: 207 LBS | BODY MASS INDEX: 28.98 KG/M2

## 2021-08-23 DIAGNOSIS — L08.9 DIABETIC FOOT INFECTION (HCC): Primary | ICD-10-CM

## 2021-08-23 DIAGNOSIS — E11.628 DIABETIC FOOT INFECTION (HCC): Primary | ICD-10-CM

## 2021-08-23 PROCEDURE — 11042 DBRDMT SUBQ TIS 1ST 20SQCM/<: CPT

## 2021-08-23 RX ORDER — GINSENG 100 MG
CAPSULE ORAL ONCE
Status: CANCELLED | OUTPATIENT
Start: 2021-08-23 | End: 2021-08-23

## 2021-08-23 RX ORDER — BETAMETHASONE DIPROPIONATE 0.05 %
OINTMENT (GRAM) TOPICAL ONCE
Status: CANCELLED | OUTPATIENT
Start: 2021-08-23 | End: 2021-08-23

## 2021-08-23 RX ORDER — LIDOCAINE HYDROCHLORIDE 20 MG/ML
JELLY TOPICAL ONCE
Status: CANCELLED | OUTPATIENT
Start: 2021-08-23 | End: 2021-08-23

## 2021-08-23 RX ORDER — GENTAMICIN SULFATE 1 MG/G
OINTMENT TOPICAL ONCE
Status: CANCELLED | OUTPATIENT
Start: 2021-08-23 | End: 2021-08-23

## 2021-08-23 RX ORDER — LIDOCAINE 40 MG/G
CREAM TOPICAL ONCE
Status: CANCELLED | OUTPATIENT
Start: 2021-08-23 | End: 2021-08-23

## 2021-08-23 RX ORDER — LIDOCAINE HYDROCHLORIDE 40 MG/ML
SOLUTION TOPICAL ONCE
Status: DISCONTINUED | OUTPATIENT
Start: 2021-08-23 | End: 2021-08-24 | Stop reason: HOSPADM

## 2021-08-23 RX ORDER — LIDOCAINE HYDROCHLORIDE 40 MG/ML
SOLUTION TOPICAL ONCE
Status: CANCELLED | OUTPATIENT
Start: 2021-08-23 | End: 2021-08-23

## 2021-08-23 RX ORDER — LIDOCAINE 50 MG/G
OINTMENT TOPICAL ONCE
Status: CANCELLED | OUTPATIENT
Start: 2021-08-23 | End: 2021-08-23

## 2021-08-23 RX ORDER — BACITRACIN ZINC AND POLYMYXIN B SULFATE 500; 1000 [USP'U]/G; [USP'U]/G
OINTMENT TOPICAL ONCE
Status: CANCELLED | OUTPATIENT
Start: 2021-08-23 | End: 2021-08-23

## 2021-08-23 RX ORDER — CLOBETASOL PROPIONATE 0.5 MG/G
OINTMENT TOPICAL ONCE
Status: CANCELLED | OUTPATIENT
Start: 2021-08-23 | End: 2021-08-23

## 2021-08-23 RX ORDER — BACITRACIN, NEOMYCIN, POLYMYXIN B 400; 3.5; 5 [USP'U]/G; MG/G; [USP'U]/G
OINTMENT TOPICAL ONCE
Status: CANCELLED | OUTPATIENT
Start: 2021-08-23 | End: 2021-08-23

## 2021-08-23 ASSESSMENT — PAIN SCALES - GENERAL: PAINLEVEL_OUTOF10: 0

## 2021-08-23 NOTE — PROGRESS NOTES
[facility]  History and Physical/Consultation  Podiatry    Referring Physician : Isabel Baldwin MD  [id number] [facility] RECORD NUMBER:  [id number]  AGE: 63 y.o. GENDER: male  : 1960  EPISODE DATE:  2021  Subjective:     Chief Complaint   Patient presents with    Wound Check     left heel         HISTORY of PRESENT ILLNESS HPI     Jane Chase is a 63 y.o. male who presents today for wound/ulcer evaluation. History of Wound Context:  The patient has had a wound of bilateral heels which was first noted approximately over a month ago. This has been treated with surgical debridement. On their initial visit to the wound healing center, 21 ,  the patient has noted that the wound has not been improving. The patient has had similar previous wounds in the past.      Pt is not on abx at time of initial visit. 3/22/21 - Wound VAC MWF continue to left heel. Aquacel Ag to right heel. Debrided toenails 1-5 in thickness and length. FU 1 week. IV Abx per Dr. Guru Holden. 21 - Wound VAC MWF to left heel. Cultures obtained. FU 1 week after visit with Dr. Guru Holden. PO Abx.  21 - DC wound VAC to left heel. Alginate to wound. FU 1 week  21 - Alginate and gentamicin ointment QOD. Partial WB to heel at 50% to foot with surgical shoe and walker. 5/3/21 - Alginate and gentamicin ointment QOD. MRI left heel    5/10/21 - Alginate and gentamicin ointment QOD. MRI left heel pending. 21 - Alginate and gentamicin ointment QOD. MRI reviewed confirming OM. Discussed HBOT referral and partial calcanectomy. He opts for HBOT consultation. 21 - Alginate and add gentamicin ointment QD. HBOT referral.  Patient hold off on partial calcanectomy. FU 1 week    21 - Alginate and add gentamicin ointment QD. HBOT referral.  Patient hold off on partial calcanectomy. FU 1 week     21 - Alginate and add gentamicin ointment QD.   HBOT referral.  Patient hold off on partial calcanectomy. FU 1 week. Patient gave me consent (verbal) to speak with his brother Margarito Wu regarding his at home arrangement. HBOT is on hold until patient is at home. 7/12/21 -  Alginate and add gentamicin ointment QD. HBOT referral.  Patient considering partial calcanectomy with flap. FU 1 week. 7/19/21 - Alginate and add gentamicin ointment QD. HBOT referral.  Patient considering partial calcanectomy with flap. FU 1 week. 8/2/21 - Alginate and add gentamicin ointment QD. HBOT referral.  Patient considering partial calcanectomy with flap. FU 1 week. 8/16/21 - Alginate and add gentamicin ointment QD. HBOT referral.  Patient considering partial calcanectomy with flap. FU 1 week. 8/23/21 - Alginate and add gentamicin ointment QD. HBOT referral.  Patient considering partial calcanectomy with flap. FU 1 week.      Wound/Ulcer Pain Timing/Severity: none  Quality of pain: N/A  Severity:  0 / 10   Modifying Factors: None  Associated Signs/Symptoms: none    Ulcer Identification:  Ulcer Type: arterial and diabetic  Contributing Factors: diabetes    Diabetic/Pressure/Non Pressure Ulcers onl y:  Ulcer: Diabetic ulcer, fat layer exposed    If patient has diabetic lower extremity wounds  Kong Classification of diabetic lower extremity wounds:    Grade Description   []  0 No open wound   []  1 Superficial ulcer involving the full skin thickness   [x]  2 Deep ulcer involves ligament, tendon, joint capsule, or fascia  No bone involvement or abscess presence   []  3 Deep Ulcer with abcess formation and/or osteomyelitis   []  4 Localized gangrene   []  5 Extensive gangrene of the foot     Wound: N/A        PAST MEDICAL HISTORY      Diagnosis Date    Cerebral artery occlusion with cerebral infarction [facility]     Diabetes mellitus ([facility]     Type 2    Hemiparesis affecting left side as late effect of cerebrovascular accident ([address]     LEFT SIDE NON DOMINANT FOLLOWING STROKE    Hypertension      Past Surgical History:   Procedure Laterality Date    FINGER AMPUTATION      FOOT DEBRIDEMENT Left 2/16/2021    LEFT FOOT DEBRIDEMENT WITH BONE BIOPSY, WOUND VAC APPLICATION performed by Ze Gill DPM at [place] [address] ARTHROSCOPY      TONSILLECTOMY      TRANSESOPHAGEAL ECHOCARDIOGRAM N/A 2/22/2021    TRANSESOPHAGEAL ECHOCARDIOGRAM WITH BUBBLE STUDY performed by Kelsi Amezquita MD at [facility] N/A 1/4/2021    EGD BIOPSY performed by Tr Clayton DO at [facility] reviewed. No pertinent family history. Social History     Tobacco Use    Smoking status: Former Smoker    Smokeless tobacco: Never Used   Vaping Use    Vaping Use: Never used   Substance Use Topics    Alcohol use: Not Currently     Comment: Former every day drinker for most of adult life    Drug use: No     Allergies   Allergen Reactions    Pcn [Penicillins]      Current Outpatient Medications on File Prior to Encounter   Medication Sig Dispense Refill    gabapentin (NEURONTIN) 100 MG capsule Take 100 mg by mouth 3 times daily.  doxycycline hyclate (VIBRAMYCIN) 100 MG capsule Take 100 mg by mouth 2 times daily      B Complex-C-E-Zn (STRESS B/ZINC) TABS Take 1 tablet by mouth daily      acetaminophen (TYLENOL) 325 MG tablet Take 650 mg by mouth every 6 hours as needed for Pain      bisacodyl (DULCOLAX) 10 MG suppository Place 10 mg rectally daily      ferrous sulfate (IRON 325) 325 (65 Fe) MG tablet Take 325 mg by mouth daily (with breakfast)      magnesium hydroxide (MILK OF MAGNESIA CONCENTRATE) 2400 MG/10ML SUSP Take 30 mLs by mouth daily as needed      collagenase 250 UNIT/GM ointment Apply topically daily Apply topically daily.       ascorbic acid (VITAMIN C) 500 MG tablet Take 500 mg by mouth daily      Cholecalciferol (VITAMIN D3) 1.25 MG (58149 UT) CAPS Take by mouth      zinc gluconate 50 MG tablet Take 50 mg by mouth daily      glucose (GLUTOSE) 40 % GEL Take 37.5 mLs by mouth as needed (hypoglycemia) 45 g 1    glucagon, rDNA, 1 MG injection Inject 1 mg into the muscle as needed for Low blood sugar (Blood glucose less than 70 mg/dL and patient NOT ALERT or NPO and does not have IV access. ) 1 each 0    vitamin D (ERGOCALCIFEROL) 1.25 MG (59004 UT) CAPS capsule Take 1 capsule by mouth once a week 5 capsule 0    ipratropium-albuterol (DUONEB) 0.5-2.5 (3) MG/3ML SOLN nebulizer solution Inhale 3 mLs into the lungs every 4 hours 360 mL 0    pantoprazole (PROTONIX) 40 MG tablet Take 1 tablet by mouth every morning (before breakfast) 30 tablet 3    melatonin 3 MG TABS tablet Take 9 mg by mouth nightly as needed      aspirin 81 MG EC tablet Take 81 mg by mouth daily      hydroCHLOROthiazide (HYDRODIURIL) 25 MG tablet Take 25 mg by mouth daily      glimepiride (AMARYL) 2 MG tablet Take 1 tablet by mouth daily (with breakfast) 30 tablet 3    insulin lispro (HUMALOG) 100 UNIT/ML injection vial Inject 0-12 Units into the skin 3 times daily (with meals) 1 vial 3    insulin lispro (HUMALOG) 100 UNIT/ML injection vial Inject 0-6 Units into the skin nightly 1 vial 3    metFORMIN (GLUCOPHAGE) 500 MG tablet Take 1 tablet by mouth 2 times daily (with meals) 60 tablet 3    atorvastatin (LIPITOR) 40 MG tablet Take 1 tablet by mouth nightly 30 tablet 3    clopidogrel (PLAVIX) 75 MG tablet Take 1 tablet by mouth daily 30 tablet 3    vitamin B-1 100 MG tablet Take 1 tablet by mouth daily 30 tablet 3     No current facility-administered medications on file prior to encounter.        REVIEW OF SYSTEMS   ROS : All others Negative if blank [], Positive if [x]  General Vascular   [] Fevers [] Claudication   [] Chills [] Rest Pain   Skin Neurologic   [x] Tissue Loss [x] Lower extremity neuropathy     Objective:    /80   Pulse 88   Temp 99 °F (37.2 °C) (Temporal)   Resp 20   Ht 5' 11\" (1.803 m)   Wt 207 lb (93.9 kg)   BMI 28.87 kg/m²   Wt Readings from Last 3 Encounters:   08/23/21 207 lb (93.9 kg)   08/16/21 207 lb (93.9 kg)   08/16/21 182 lb (82.6 kg)       PHYSICAL EXAM   CONSTITUTIONAL:   Awake, alert, cooperative   PSYCHIATRIC :  Oriented to time, place and person      normal insight to disease process  EXTREMITIES:   R LE Open wounds are noted   Skin color is normal   Edema is not noted   Sensation deficit noted - to foot   Palpation of the foot does cause pain   5/5 strength DF/PF  L LE Open wounds are noted   Skin color is abnormal with hyperpigmentation   Edema is not noted   Sensation deficit noted - bilateral feet   Palpation of the foot does cause pain   5/5 strength DF/PF  R dorsalis pedis +2 L dorsalis pedis +2   R posterior tibial +2 L posterior tibial +2     Assessment:     Problem List Items Addressed This Visit     Diabetic foot infection ([facility] [facility] - Primary    Relevant Medications    lidocaine (XYLOCAINE) 4 % external solution (Start on 8/23/2021  3:15 PM)    Other Relevant Orders    Initiate Outpatient Wound Care Protocol          Pre Debridement Measurements:  Are located in the [place]  Documentation Flow Sheet  Post Debridement Measurements:  Wound/Ulcer Descriptions are Pre Debridement except measurements:     Wound 12/26/20 Arm Left (Active)   Number of days: 239       Wound 02/15/21 Heel Left (Active)   Number of days: 189       Wound 02/15/21 Heel Right (Active)   Number of days: 189       Wound 03/08/21 Heel Left #1 left heel aquired 12/1/20 (Active)   Wound Image   07/19/21 1054   Dressing Status New dressing applied 08/02/21 1535   Wound Cleansed Cleansed with saline 08/02/21 1535   Dressing/Treatment Alginate;ABD;Roll gauze 08/02/21 1535   Offloading for Diabetic Foot Ulcers Post op shoe 08/02/21 1535   Wound Length (cm) 4.2 cm 08/23/21 1443   Wound Width (cm) 2.8 cm 08/23/21 1443   Wound Depth (cm) 0.4 cm 08/23/21 1443   Wound Surface Area (cm^2) 11.76 cm^2 08/23/21 1443   Change in Wound Size % (l*w) 2.73 08/23/21 1443   Wound Volume (cm^3) 4.704 cm^3 08/23/21 1443   Wound Healing % 44 08/23/21 1443   Post-Procedure Length (cm) 4.2 cm 08/23/21 1456   Post-Procedure Width (cm) 2.8 cm 08/23/21 1456   Post-Procedure Depth (cm) 0.4 cm 08/23/21 1456   Post-Procedure Surface Area (cm^2) 11.76 cm^2 08/23/21 1456   Post-Procedure Volume (cm^3) 4.704 cm^3 08/23/21 1456   Undermining Starts ___ O'Clock 6 07/19/21 1054   Undermining Ends___ O'Clock 3 07/19/21 1054   Undermining Maxium Distance (cm) 0.6 07/19/21 1054   Wound Assessment Fibrin;Pale granulation tissue 08/23/21 1443   Drainage Amount Moderate 08/23/21 1443   Drainage Description Yellow; Thick; Serosanguinous 08/23/21 1443   Odor None 08/23/21 1443   Adrianne-wound Assessment Blanchable erythema; Hyperkeratosis (callous); Maceration 08/23/21 1443   Number of days: 168       Wound 03/08/21 Heel Right #2 rt heel aquired 12/1/20 (Active)   Wound Image   04/05/21 0929   Dressing Status New dressing applied 03/18/21 1352   Wound Cleansed Cleansed with saline 04/01/21 1536   Dressing/Treatment Alginate;Collagen;Silicone border 27/49/67 1536   Offloading for Diabetic Foot Ulcers Post op shoe 04/01/21 1536   Wound Length (cm) 0 cm 04/05/21 0929   Wound Width (cm) 0 cm 04/05/21 0929   Wound Depth (cm) 0 cm 04/05/21 0929   Wound Surface Area (cm^2) 0 cm^2 04/05/21 0929   Change in Wound Size % (l*w) 100 04/05/21 0929   Wound Volume (cm^3) 0 cm^3 04/05/21 0929   Wound Healing % 100 04/05/21 0929   Post-Procedure Length (cm) 0 cm 04/05/21 1011   Post-Procedure Width (cm) 0 cm 04/05/21 1011   Post-Procedure Depth (cm) 0 cm 04/05/21 1011   Post-Procedure Surface Area (cm^2) 0 cm^2 04/05/21 1011   Post-Procedure Volume (cm^3) 0 cm^3 04/05/21 1011   Wound Assessment Fibrin 04/01/21 1357   Drainage Amount None 04/01/21 1357   Drainage Description Yellow 03/22/21 0911   Odor None 04/01/21 1357   Adrianne-wound Assessment Maceration 04/01/21 1357   Number of days: 168     Incision 02/19/21 Groin Right (Active) Number of days: 185       Procedure Note  Indications:  Based on my examination of this patient's wound(s)/ulcer(s) today, debridement is required to promote healing and evaluate the wound base. Performed by: Sparkle Smith DPM    Consent obtained:  Yes    Time out taken:  Yes    Pain Control: Anesthetic  Anesthetic: 4% Lidocaine Liquid Topical     Debridement:Excisional Debridement    Using curette the wound(s)/ulcer(s) was/were sharply debrided down through and including the removal of subcutaneous tissue. Devitalized Tissue Debrided:  slough to stimulate bleeding to promote healing, post debridement good bleeding base and wound edges noted    Wound/Ulcer #: 2    Percent of Wound/Ulcer Debrided: 100%    Total Surface Area Debrided:  11.76 sq cm     Estimated Blood Loss:  None  Hemostasis Achieved:  by pressure    Procedural Pain:  2  / 10   Post Procedural Pain:  2 / 10     Response to treatment:  Well tolerated by patient. A culture was done. Plan:     Pt is a smoker   - Discussed relationship of smoking and negative affects on wound healing   - Emphasized importance of tobacco avoidace/cessation   - Script for nicotine patch given to patient   - Information regarding support groups for smoking cessation given    In my professional opinion and based off the information that is available at this time this patient has appropriate indication for HBO Therapy: Maybe    Treatment Note please see attached Discharge Instructions    Written patient dismissal instructions given to patient and signed by patient or POA.            Electronically signed by Sparkle Smith DPM on 8/23/2021 at 3:07 PM

## 2021-08-23 NOTE — PLAN OF CARE
Problem: Wound:  Goal: Will show signs of wound healing; wound closure and no evidence of infection  Description: Will show signs of wound healing; wound closure and no evidence of infection  8/23/2021 1441 by Eric Anderson RN  Outcome: Met This Shift  8/23/2021 1441 by Eric Anderson RN  Outcome: Met This Shift     Problem: Blood Glucose:  Goal: Ability to maintain appropriate glucose levels will improve  Description: Ability to maintain appropriate glucose levels will improve  8/23/2021 1441 by Eric Anderson RN  Outcome: Met This Shift  8/23/2021 1441 by Eric Anderson RN  Outcome: Met This Shift     Problem: Pain:  Goal: Pain level will decrease  Description: Pain level will decrease  8/23/2021 1441 by Eric Anderson RN  Outcome: Met This Shift  8/23/2021 1441 by Eric Anderson RN  Outcome: Met This Shift

## 2021-08-30 ENCOUNTER — HOSPITAL ENCOUNTER (OUTPATIENT)
Dept: WOUND CARE | Age: 61
Discharge: HOME | End: 2021-08-30
Payer: COMMERCIAL

## 2021-08-30 VITALS — DIASTOLIC BLOOD PRESSURE: 58 MMHG | SYSTOLIC BLOOD PRESSURE: 124 MMHG | HEART RATE: 78 BPM | RESPIRATION RATE: 18 BRPM

## 2021-08-30 DIAGNOSIS — E11.628 DIABETIC FOOT INFECTION (HCC): Primary | ICD-10-CM

## 2021-08-30 DIAGNOSIS — L08.9 DIABETIC FOOT INFECTION (HCC): Primary | ICD-10-CM

## 2021-08-30 PROCEDURE — 11720 DEBRIDE NAIL 1-5: CPT

## 2021-08-30 PROCEDURE — 6370000000 HC RX 637 (ALT 250 FOR IP): Performed by: PODIATRIST

## 2021-08-30 PROCEDURE — 11042 DBRDMT SUBQ TIS 1ST 20SQCM/<: CPT

## 2021-08-30 RX ORDER — LIDOCAINE HYDROCHLORIDE 20 MG/ML
JELLY TOPICAL ONCE
Status: CANCELLED | OUTPATIENT
Start: 2021-08-30 | End: 2021-08-30

## 2021-08-30 RX ORDER — GINSENG 100 MG
CAPSULE ORAL ONCE
Status: CANCELLED | OUTPATIENT
Start: 2021-08-30 | End: 2021-08-30

## 2021-08-30 RX ORDER — LIDOCAINE HYDROCHLORIDE 40 MG/ML
SOLUTION TOPICAL ONCE
Status: CANCELLED | OUTPATIENT
Start: 2021-08-30 | End: 2021-08-30

## 2021-08-30 RX ORDER — BACITRACIN ZINC AND POLYMYXIN B SULFATE 500; 1000 [USP'U]/G; [USP'U]/G
OINTMENT TOPICAL ONCE
Status: CANCELLED | OUTPATIENT
Start: 2021-08-30 | End: 2021-08-30

## 2021-08-30 RX ORDER — BETAMETHASONE DIPROPIONATE 0.05 %
OINTMENT (GRAM) TOPICAL ONCE
Status: CANCELLED | OUTPATIENT
Start: 2021-08-30 | End: 2021-08-30

## 2021-08-30 RX ORDER — LIDOCAINE 40 MG/G
CREAM TOPICAL ONCE
Status: CANCELLED | OUTPATIENT
Start: 2021-08-30 | End: 2021-08-30

## 2021-08-30 RX ORDER — LIDOCAINE HYDROCHLORIDE 40 MG/ML
SOLUTION TOPICAL ONCE
Status: COMPLETED | OUTPATIENT
Start: 2021-08-30 | End: 2021-08-30

## 2021-08-30 RX ORDER — BACITRACIN, NEOMYCIN, POLYMYXIN B 400; 3.5; 5 [USP'U]/G; MG/G; [USP'U]/G
OINTMENT TOPICAL ONCE
Status: CANCELLED | OUTPATIENT
Start: 2021-08-30 | End: 2021-08-30

## 2021-08-30 RX ORDER — GENTAMICIN SULFATE 1 MG/G
OINTMENT TOPICAL ONCE
Status: CANCELLED | OUTPATIENT
Start: 2021-08-30 | End: 2021-08-30

## 2021-08-30 RX ORDER — LIDOCAINE 50 MG/G
OINTMENT TOPICAL ONCE
Status: CANCELLED | OUTPATIENT
Start: 2021-08-30 | End: 2021-08-30

## 2021-08-30 RX ORDER — CLOBETASOL PROPIONATE 0.5 MG/G
OINTMENT TOPICAL ONCE
Status: CANCELLED | OUTPATIENT
Start: 2021-08-30 | End: 2021-08-30

## 2021-08-30 RX ADMIN — LIDOCAINE HYDROCHLORIDE 10 ML: 40 SOLUTION TOPICAL at 15:21

## 2021-08-30 ASSESSMENT — PAIN SCALES - GENERAL: PAINLEVEL_OUTOF10: 0

## 2021-08-30 NOTE — PROGRESS NOTES
[facility]  History and Physical/Consultation  Podiatry    Referring Physician : Sherno Chino MD  [id number] [facility] RECORD NUMBER:  [id number]  AGE: 63 y.o. GENDER: male  : 1960  EPISODE DATE:  2021  Subjective:     Chief Complaint   Patient presents with    Wound Check     Left foot         HISTORY of PRESENT ILLNESS HPI     Vicky Cervantes is a 63 y.o. male who presents today for wound/ulcer evaluation. History of Wound Context:  The patient has had a wound of bilateral heels which was first noted approximately over a month ago. This has been treated with surgical debridement. On their initial visit to the wound healing center, 21 ,  the patient has noted that the wound has not been improving. The patient has had similar previous wounds in the past.      Pt is not on abx at time of initial visit. 3/22/21 - Wound VAC MWF continue to left heel. Aquacel Ag to right heel. Debrided toenails 1-5 in thickness and length. FU 1 week. IV Abx per Dr. Enzo Sylvester. 21 - Wound VAC MWF to left heel. Cultures obtained. FU 1 week after visit with Dr. Enzo Sylvester. PO Abx.  21 - DC wound VAC to left heel. Alginate to wound. FU 1 week  21 - Alginate and gentamicin ointment QOD. Partial WB to heel at 50% to foot with surgical shoe and walker. 5/3/21 - Alginate and gentamicin ointment QOD. MRI left heel    5/10/21 - Alginate and gentamicin ointment QOD. MRI left heel pending. 21 - Alginate and gentamicin ointment QOD. MRI reviewed confirming OM. Discussed HBOT referral and partial calcanectomy. He opts for HBOT consultation. 21 - Alginate and add gentamicin ointment QD. HBOT referral.  Patient hold off on partial calcanectomy. FU 1 week    21 - Alginate and add gentamicin ointment QD. HBOT referral.  Patient hold off on partial calcanectomy. FU 1 week     21 - Alginate and add gentamicin ointment QD.   HBOT referral.  Patient hold off on partial calcanectomy. FU 1 week. Patient gave me consent (verbal) to speak with his brother Margarito Wu regarding his at home arrangement. HBOT is on hold until patient is at home. 7/12/21 -  Alginate and add gentamicin ointment QD. HBOT referral.  Patient considering partial calcanectomy with flap. FU 1 week. 7/19/21 - Alginate and add gentamicin ointment QD. HBOT referral.  Patient considering partial calcanectomy with flap. FU 1 week. 8/2/21 - Alginate and add gentamicin ointment QD. HBOT referral.  Patient considering partial calcanectomy with flap. FU 1 week. 8/16/21 - Alginate and add gentamicin ointment QD. HBOT referral.  Patient considering partial calcanectomy with flap. FU 1 week. 8/23/21 - Alginate and add gentamicin ointment QD. HBOT referral.  Patient considering partial calcanectomy with flap. FU 1 week. 8/30/21 - Alginate and add gentamicin ointment QD. HBOT referral.  Patient considering partial calcanectomy with flap. FU 1 week.    Wound/Ulcer Pain Timing/Severity: none  Quality of pain: N/A  Severity:  0 / 10   Modifying Factors: None  Associated Signs/Symptoms: none    Ulcer Identification:  Ulcer Type: arterial and diabetic  Contributing Factors: diabetes    Diabetic/Pressure/Non Pressure Ulcers onl y:  Ulcer: Diabetic ulcer, fat layer exposed    If patient has diabetic lower extremity wounds  Kong Classification of diabetic lower extremity wounds:    Grade Description   []  0 No open wound   []  1 Superficial ulcer involving the full skin thickness   [x]  2 Deep ulcer involves ligament, tendon, joint capsule, or fascia  No bone involvement or abscess presence   []  3 Deep Ulcer with abcess formation and/or osteomyelitis   []  4 Localized gangrene   []  5 Extensive gangrene of the foot     Wound: N/A        PAST MEDICAL HISTORY      Diagnosis Date    Cerebral artery occlusion with [facility] infarction [facility] [facility]     Diabetes mellitus ([facility] [address] [facility]     Type 2    Hemiparesis affecting left side as late effect of cerebrovascular accident ([facility] [address]     LEFT SIDE NON DOMINANT FOLLOWING STROKE    Hypertension      Past Surgical History:   Procedure Laterality Date    FINGER AMPUTATION      FOOT DEBRIDEMENT Left 2/16/2021    LEFT FOOT DEBRIDEMENT WITH BONE BIOPSY, WOUND VAC APPLICATION performed by Sparkle Smith DPM at [place] [address] ARTHROSCOPY      TONSILLECTOMY      TRANSESOPHAGEAL ECHOCARDIOGRAM N/A 2/22/2021    TRANSESOPHAGEAL ECHOCARDIOGRAM WITH BUBBLE STUDY performed by Lex Malagon MD at [facility] N/A 1/4/2021    EGD BIOPSY performed by Aliza Livingston DO at [place] [address] reviewed. No pertinent family history. Social History     Tobacco Use    Smoking status: Former Smoker    Smokeless tobacco: Never Used   Vaping Use    Vaping Use: Never used   Substance Use Topics    Alcohol use: Not Currently     Comment: Former every day drinker for most of adult life    Drug use: No     Allergies   Allergen Reactions    Pcn [Penicillins]      Current Outpatient Medications on File Prior to Encounter   Medication Sig Dispense Refill    gabapentin (NEURONTIN) 100 MG capsule Take 100 mg by mouth 3 times daily.  doxycycline hyclate (VIBRAMYCIN) 100 MG capsule Take 100 mg by mouth 2 times daily      B Complex-C-E-Zn (STRESS B/ZINC) TABS Take 1 tablet by mouth daily      acetaminophen (TYLENOL) 325 MG tablet Take 650 mg by mouth every 6 hours as needed for Pain      bisacodyl (DULCOLAX) 10 MG suppository Place 10 mg rectally daily      ferrous sulfate (IRON 325) 325 (65 Fe) MG tablet Take 325 mg by mouth daily (with breakfast)      magnesium hydroxide (MILK OF MAGNESIA CONCENTRATE) 2400 MG/10ML SUSP Take 30 mLs by mouth daily as needed      collagenase 250 UNIT/GM ointment Apply topically daily Apply topically daily.       ascorbic acid (VITAMIN C) 500 MG tablet Take 500 mg by mouth daily      Cholecalciferol (VITAMIN D3) 1.25 MG (09263 UT) CAPS Take by mouth      zinc gluconate 50 MG tablet Take 50 mg by mouth daily      vitamin D (ERGOCALCIFEROL) 1.25 MG (88430 UT) CAPS capsule Take 1 capsule by mouth once a week 5 capsule 0    ipratropium-albuterol (DUONEB) 0.5-2.5 (3) MG/3ML SOLN nebulizer solution Inhale 3 mLs into the lungs every 4 hours 360 mL 0    pantoprazole (PROTONIX) 40 MG tablet Take 1 tablet by mouth every morning (before breakfast) 30 tablet 3    melatonin 3 MG TABS tablet Take 9 mg by mouth nightly as needed      aspirin 81 MG EC tablet Take 81 mg by mouth daily      hydroCHLOROthiazide (HYDRODIURIL) 25 MG tablet Take 25 mg by mouth daily      glimepiride (AMARYL) 2 MG tablet Take 1 tablet by mouth daily (with breakfast) 30 tablet 3    insulin lispro (HUMALOG) 100 UNIT/ML injection vial Inject 0-12 Units into the skin 3 times daily (with meals) 1 vial 3    insulin lispro (HUMALOG) 100 UNIT/ML injection vial Inject 0-6 Units into the skin nightly 1 vial 3    metFORMIN (GLUCOPHAGE) 500 MG tablet Take 1 tablet by mouth 2 times daily (with meals) 60 tablet 3    atorvastatin (LIPITOR) 40 MG tablet Take 1 tablet by mouth nightly 30 tablet 3    clopidogrel (PLAVIX) 75 MG tablet Take 1 tablet by mouth daily 30 tablet 3    vitamin B-1 100 MG tablet Take 1 tablet by mouth daily 30 tablet 3    glucose (GLUTOSE) 40 % GEL Take 37.5 mLs by mouth as needed (hypoglycemia) 45 g 1    glucagon, rDNA, 1 MG injection Inject 1 mg into the muscle as needed for Low blood sugar (Blood glucose less than 70 mg/dL and patient NOT ALERT or NPO and does not have IV access. ) 1 each 0     No current facility-administered medications on file prior to encounter.        REVIEW OF SYSTEMS   ROS : All others Negative if blank [], Positive if [x]  General Vascular   [] Fevers [] Claudication   [] Chills [] Rest Pain   Skin Neurologic   [x] Tissue Loss [x] Lower extremity neuropathy     Objective:    BP (!) 124/58   Pulse 78   Resp 18   Wt Readings from Last 3 Encounters:   08/23/21 207 lb (93.9 kg)   08/16/21 207 lb (93.9 kg)   08/16/21 182 lb (82.6 kg)       PHYSICAL EXAM   CONSTITUTIONAL:   Awake, alert, cooperative   PSYCHIATRIC :  Oriented to time, place and person      normal insight to disease process  EXTREMITIES:   R LE Open wounds are noted   Skin color is normal   Edema is not noted   Sensation deficit noted - to foot   Palpation of the foot does cause pain   5/5 strength DF/PF  L LE Open wounds are noted   Skin color is abnormal with hyperpigmentation   Edema is not noted   Sensation deficit noted - bilateral feet   Palpation of the foot does cause pain   5/5 strength DF/PF  R dorsalis pedis +2 L dorsalis pedis +2   R posterior tibial +2 L posterior tibial +2     Assessment:     Problem List Items Addressed This Visit     Diabetic foot infection ([facility] [address] - Primary    Relevant Orders    Initiate Outpatient Wound Care Protocol          Pre Debridement Measurements:  Are located in the [place]  Documentation Flow Sheet  Post Debridement Measurements:  Wound/Ulcer Descriptions are Pre Debridement except measurements:     Wound 12/26/20 Arm Left (Active)   Number of days: 246       Wound 02/15/21 Heel Left (Active)   Number of days: 196       Wound 02/15/21 Heel Right (Active)   Number of days: 196       Wound 03/08/21 Heel Left #1 left heel aquired 12/1/20 (Active)   Wound Image   08/30/21 1516   Dressing Status New dressing applied 08/23/21 1508   Wound Cleansed Cleansed with saline 08/23/21 1508   Dressing/Treatment Alginate;ABD;Roll gauze 08/23/21 1508   Offloading for Diabetic Foot Ulcers Post op shoe 08/23/21 1508   Wound Length (cm) 3.9 cm 08/30/21 1516   Wound Width (cm) 3 cm 08/30/21 1516   Wound Depth (cm) 0.7 cm 08/30/21 1516   Wound Surface Area (cm^2) 11.7 cm^2 08/30/21 1516   Change in Wound Size % (l*w) 3.23 08/30/21 1516   Wound Volume (cm^3) 8.19 cm^3 08/30/21 1516   Wound Healing % 3 08/30/21 1516   Post-Procedure Length (cm) 3.9 cm 08/30/21 1533   Post-Procedure Width (cm) 3 cm 08/30/21 1533   Post-Procedure Depth (cm) 0.7 cm 08/30/21 1533   Post-Procedure Surface Area (cm^2) 11.7 cm^2 08/30/21 1533   Post-Procedure Volume (cm^3) 8.19 cm^3 08/30/21 1533   Undermining Starts ___ O'Clock 6 07/19/21 1054   Undermining Ends___ O'Clock 3 07/19/21 1054   Undermining Maxium Distance (cm) 0.6 07/19/21 1054   Wound Assessment Fibrin;Pink/red 08/30/21 1516   Drainage Amount Moderate 08/30/21 1516   Drainage Description Yellow 08/30/21 1516   Odor None 08/30/21 1516   Adrianne-wound Assessment Maceration 08/30/21 1516   Number of days: 175       Wound 03/08/21 Heel Right #2 rt heel aquired 12/1/20 (Active)   Wound Image   04/05/21 0929   Dressing Status New dressing applied 03/18/21 1352   Wound Cleansed Cleansed with saline 04/01/21 1536   Dressing/Treatment Alginate;Collagen;Silicone border 89/96/55 1536   Offloading for Diabetic Foot Ulcers Post op shoe 04/01/21 1536   Wound Length (cm) 0 cm 04/05/21 0929   Wound Width (cm) 0 cm 04/05/21 0929   Wound Depth (cm) 0 cm 04/05/21 0929   Wound Surface Area (cm^2) 0 cm^2 04/05/21 0929   Change in Wound Size % (l*w) 100 04/05/21 0929   Wound Volume (cm^3) 0 cm^3 04/05/21 0929   Wound Healing % 100 04/05/21 0929   Post-Procedure Length (cm) 0 cm 04/05/21 1011   Post-Procedure Width (cm) 0 cm 04/05/21 1011   Post-Procedure Depth (cm) 0 cm 04/05/21 1011   Post-Procedure Surface Area (cm^2) 0 cm^2 04/05/21 1011   Post-Procedure Volume (cm^3) 0 cm^3 04/05/21 1011   Wound Assessment Fibrin 04/01/21 1357   Drainage Amount None 04/01/21 1357   Drainage Description Yellow 03/22/21 0911   Odor None 04/01/21 1357   Adrianne-wound Assessment Maceration 04/01/21 1357   Number of days: 175     Incision 02/19/21 Groin Right (Active)   Number of days: 192       Procedure Note  Indications:  Based on my examination of this patient's wound(s)/ulcer(s) today, debridement is required to promote healing and evaluate the wound base. Performed by: Delta Emery DPM    Consent obtained:  Yes    Time out taken:  Yes    Pain Control: Anesthetic  Anesthetic: 4% Lidocaine Liquid Topical     Debridement:Excisional Debridement    Using curette the wound(s)/ulcer(s) was/were sharply debrided down through and including the removal of subcutaneous tissue. Devitalized Tissue Debrided:  slough to stimulate bleeding to promote healing, post debridement good bleeding base and wound edges noted    Wound/Ulcer #: 2    Percent of Wound/Ulcer Debrided: 100%    Total Surface Area Debrided:  11.7 sq cm     Estimated Blood Loss:  None  Hemostasis Achieved:  by pressure    Procedural Pain:  2  / 10   Post Procedural Pain:  2 / 10     Response to treatment:  Well tolerated by patient. A culture was done. Plan:     Pt is a smoker   - Discussed relationship of smoking and negative affects on wound healing   - Emphasized importance of tobacco avoidace/cessation   - Script for nicotine patch given to patient   - Information regarding support groups for smoking cessation given    In my professional opinion and based off the information that is available at this time this patient has appropriate indication for HBO Therapy: Maybe    Treatment Note please see attached Discharge Instructions    Written patient dismissal instructions given to patient and signed by patient or POA.            Electronically signed by Delta Emery DPM on 8/30/2021 at 3:36 PM

## 2021-09-20 ENCOUNTER — HOSPITAL ENCOUNTER (OUTPATIENT)
Dept: WOUND CARE | Age: 61
Discharge: HOME | End: 2021-09-20
Payer: COMMERCIAL

## 2021-09-20 VITALS
DIASTOLIC BLOOD PRESSURE: 64 MMHG | RESPIRATION RATE: 16 BRPM | HEART RATE: 72 BPM | TEMPERATURE: 97 F | SYSTOLIC BLOOD PRESSURE: 110 MMHG

## 2021-09-20 DIAGNOSIS — L08.9 DIABETIC FOOT INFECTION (HCC): Primary | ICD-10-CM

## 2021-09-20 DIAGNOSIS — E11.628 DIABETIC FOOT INFECTION (HCC): Primary | ICD-10-CM

## 2021-09-20 PROCEDURE — 87186 SC STD MICRODIL/AGAR DIL: CPT

## 2021-09-20 PROCEDURE — 87205 SMEAR GRAM STAIN: CPT

## 2021-09-20 PROCEDURE — 6370000000 HC RX 637 (ALT 250 FOR IP): Performed by: PODIATRIST

## 2021-09-20 PROCEDURE — 87070 CULTURE OTHR SPECIMN AEROBIC: CPT

## 2021-09-20 PROCEDURE — 87077 CULTURE AEROBIC IDENTIFY: CPT

## 2021-09-20 PROCEDURE — 87075 CULTR BACTERIA EXCEPT BLOOD: CPT

## 2021-09-20 PROCEDURE — 11042 DBRDMT SUBQ TIS 1ST 20SQCM/<: CPT

## 2021-09-20 RX ORDER — BACITRACIN, NEOMYCIN, POLYMYXIN B 400; 3.5; 5 [USP'U]/G; MG/G; [USP'U]/G
OINTMENT TOPICAL ONCE
Status: CANCELLED | OUTPATIENT
Start: 2021-09-20 | End: 2021-09-20

## 2021-09-20 RX ORDER — LIDOCAINE 40 MG/G
CREAM TOPICAL ONCE
Status: CANCELLED | OUTPATIENT
Start: 2021-09-20 | End: 2021-09-20

## 2021-09-20 RX ORDER — LIDOCAINE HYDROCHLORIDE 20 MG/ML
JELLY TOPICAL ONCE
Status: CANCELLED | OUTPATIENT
Start: 2021-09-20 | End: 2021-09-20

## 2021-09-20 RX ORDER — LIDOCAINE HYDROCHLORIDE 40 MG/ML
SOLUTION TOPICAL ONCE
Status: CANCELLED | OUTPATIENT
Start: 2021-09-20 | End: 2021-09-20

## 2021-09-20 RX ORDER — BACITRACIN ZINC AND POLYMYXIN B SULFATE 500; 1000 [USP'U]/G; [USP'U]/G
OINTMENT TOPICAL ONCE
Status: CANCELLED | OUTPATIENT
Start: 2021-09-20 | End: 2021-09-20

## 2021-09-20 RX ORDER — LIDOCAINE 50 MG/G
OINTMENT TOPICAL ONCE
Status: CANCELLED | OUTPATIENT
Start: 2021-09-20 | End: 2021-09-20

## 2021-09-20 RX ORDER — BETAMETHASONE DIPROPIONATE 0.05 %
OINTMENT (GRAM) TOPICAL ONCE
Status: CANCELLED | OUTPATIENT
Start: 2021-09-20 | End: 2021-09-20

## 2021-09-20 RX ORDER — GENTAMICIN SULFATE 1 MG/G
OINTMENT TOPICAL ONCE
Status: CANCELLED | OUTPATIENT
Start: 2021-09-20 | End: 2021-09-20

## 2021-09-20 RX ORDER — LIDOCAINE HYDROCHLORIDE 40 MG/ML
SOLUTION TOPICAL ONCE
Status: COMPLETED | OUTPATIENT
Start: 2021-09-20 | End: 2021-09-20

## 2021-09-20 RX ORDER — CIPROFLOXACIN 500 MG/1
500 TABLET, FILM COATED ORAL 2 TIMES DAILY
COMMUNITY
End: 2021-12-02 | Stop reason: ALTCHOICE

## 2021-09-20 RX ORDER — GINSENG 100 MG
CAPSULE ORAL ONCE
Status: CANCELLED | OUTPATIENT
Start: 2021-09-20 | End: 2021-09-20

## 2021-09-20 RX ORDER — CLOBETASOL PROPIONATE 0.5 MG/G
OINTMENT TOPICAL ONCE
Status: CANCELLED | OUTPATIENT
Start: 2021-09-20 | End: 2021-09-20

## 2021-09-20 RX ADMIN — LIDOCAINE HYDROCHLORIDE: 40 SOLUTION TOPICAL at 15:35

## 2021-09-20 NOTE — PROGRESS NOTES
[facility]aling Center  History and Physical/Consultation  Podiatry    Referring Physician : Martín Baxter MD  [address] [facility] RECORD NUMBER:  [id number]  AGE: 57 y.o. GENDER: male  : 1960  EPISODE DATE:  2021  Subjective:     Chief Complaint   Patient presents with    Wound Check     wuond left heel         HISTORY of PRESENT ILLNESS HPI     Kinjal Shetty is a 57 y.o. male who presents today for wound/ulcer evaluation. History of Wound Context:  The patient has had a wound of bilateral heels which was first noted approximately over a month ago. This has been treated with surgical debridement. On their initial visit to the wound healing center, 21 ,  the patient has noted that the wound has not been improving. The patient has had similar previous wounds in the past.      Pt is not on abx at time of initial visit. 3/22/21 - Wound VAC MWF continue to left heel. Aquacel Ag to right heel. Debrided toenails 1-5 in thickness and length. FU 1 week. IV Abx per Dr. Jennyfer Cisneros. 21 - Wound VAC MWF to left heel. Cultures obtained. FU 1 week after visit with Dr. Jennyfer Cisneros. PO Abx.  21 - DC wound VAC to left heel. Alginate to wound. FU 1 week  21 - Alginate and gentamicin ointment QOD. Partial WB to heel at 50% to foot with surgical shoe and walker. 5/3/21 - Alginate and gentamicin ointment QOD. MRI left heel    5/10/21 - Alginate and gentamicin ointment QOD. MRI left heel pending. 21 - Alginate and gentamicin ointment QOD. MRI reviewed confirming OM. Discussed HBOT referral and partial calcanectomy. He opts for HBOT consultation. 21 - Alginate and add gentamicin ointment QD. HBOT referral.  Patient hold off on partial calcanectomy. FU 1 week    21 - Alginate and add gentamicin ointment QD. HBOT referral.  Patient hold off on partial calcanectomy. FU 1 week     21 - Alginate and add gentamicin ointment QD.   HBOT referral. Patient hold off on partial calcanectomy. FU 1 week. Patient gave me consent (verbal) to speak with his brother Samantha Sow regarding his at home arrangement. HBOT is on hold until patient is at home. 7/12/21 -  Alginate and add gentamicin ointment QD. HBOT referral.  Patient considering partial calcanectomy with flap. FU 1 week. 7/19/21 - Alginate and add gentamicin ointment QD. HBOT referral.  Patient considering partial calcanectomy with flap. FU 1 week. 8/2/21 - Alginate and add gentamicin ointment QD. HBOT referral.  Patient considering partial calcanectomy with flap. FU 1 week. 8/16/21 - Alginate and add gentamicin ointment QD. HBOT referral.  Patient considering partial calcanectomy with flap. FU 1 week. 8/23/21 - Alginate and add gentamicin ointment QD. HBOT referral.  Patient considering partial calcanectomy with flap. FU 1 week. 8/30/21 - Alginate and add gentamicin ointment QD. HBOT referral.  Patient considering partial calcanectomy with flap. FU 1 week. 9/20/21 - Alginate and add gentamicin ointment QD. HBOT referral.  Patient considering partial calcanectomy with flap. FU 1 week.       Wound/Ulcer Pain Timing/Severity: none  Quality of pain: N/A  Severity:  0 / 10   Modifying Factors: None  Associated Signs/Symptoms: none    Ulcer Identification:  Ulcer Type: arterial and diabetic  Contributing Factors: diabetes    Diabetic/Pressure/Non Pressure Ulcers onl y:  Ulcer: Diabetic ulcer, fat layer exposed    If patient has diabetic lower extremity wounds  Kong Classification of diabetic lower extremity wounds:    Grade Description   []  0 No open wound   []  1 Superficial ulcer involving the full skin thickness   [x]  2 Deep ulcer involves ligament, tendon, joint capsule, or fascia  No bone involvement or abscess presence   []  3 Deep Ulcer with abcess formation and/or osteomyelitis   []  4 Localized gangrene   []  5 Extensive gangrene of the foot     Wound: N/A PAST MEDICAL HISTORY      Diagnosis Date    Cerebral artery occlusion with cerebral infarction ([facility] [address]     Diabetes mellitus ([facility] [address]     Type 2    Hemiparesis affecting left side as late effect of cerebrovascular accident ([facility] [address]     LEFT SIDE NON DOMINANT FOLLOWING STROKE    Hypertension      Past Surgical History:   Procedure Laterality Date    FINGER AMPUTATION      FOOT DEBRIDEMENT Left 2/16/2021    LEFT FOOT DEBRIDEMENT WITH BONE BIOPSY, WOUND VAC APPLICATION performed by Ze Gill DPM at [place] [address] ARTHROSCOPY      TONSILLECTOMY      TRANSESOPHAGEAL ECHOCARDIOGRAM N/A 2/22/2021    TRANSESOPHAGEAL ECHOCARDIOGRAM WITH BUBBLE STUDY performed by Kelsi Amezquita MD at [address] [place] [address] N/A 1/4/2021    EGD BIOPSY performed by Tr Clayton DO at [place] [id number] reviewed. No pertinent family history. Social History     Tobacco Use    Smoking status: Former Smoker    Smokeless tobacco: Never Used   Vaping Use    Vaping Use: Never used   Substance Use Topics    Alcohol use: Not Currently     Comment: Former every day drinker for most of adult life    Drug use: No     Allergies   Allergen Reactions    Pcn [Penicillins]      Current Outpatient Medications on File Prior to Encounter   Medication Sig Dispense Refill    ciprofloxacin (CIPRO) 500 MG tablet Take 500 mg by mouth 2 times daily      gabapentin (NEURONTIN) 100 MG capsule Take 100 mg by mouth 3 times daily.        doxycycline hyclate (VIBRAMYCIN) 100 MG capsule Take 100 mg by mouth 2 times daily      B Complex-C-E-Zn (STRESS B/ZINC) TABS Take 1 tablet by mouth daily      ferrous sulfate (IRON 325) 325 (65 Fe) MG tablet Take 325 mg by mouth daily (with breakfast)      ascorbic acid (VITAMIN C) 500 MG tablet Take 500 mg by mouth daily      Cholecalciferol (VITAMIN D3) 1.25 MG (37852 UT) CAPS Take by mouth      zinc gluconate 50 MG tablet Take 50 mg by mouth daily      vitamin D (ERGOCALCIFEROL) 1.25 MG (48128 UT) CAPS capsule Take 1 capsule by mouth once a week 5 capsule 0    ipratropium-albuterol (DUONEB) 0.5-2.5 (3) MG/3ML SOLN nebulizer solution Inhale 3 mLs into the lungs every 4 hours 360 mL 0    pantoprazole (PROTONIX) 40 MG tablet Take 1 tablet by mouth every morning (before breakfast) 30 tablet 3    melatonin 3 MG TABS tablet Take 9 mg by mouth nightly as needed      aspirin 81 MG EC tablet Take 81 mg by mouth daily      hydroCHLOROthiazide (HYDRODIURIL) 25 MG tablet Take 25 mg by mouth daily      glimepiride (AMARYL) 2 MG tablet Take 1 tablet by mouth daily (with breakfast) 30 tablet 3    insulin lispro (HUMALOG) 100 UNIT/ML injection vial Inject 0-12 Units into the skin 3 times daily (with meals) 1 vial 3    insulin lispro (HUMALOG) 100 UNIT/ML injection vial Inject 0-6 Units into the skin nightly 1 vial 3    metFORMIN (GLUCOPHAGE) 500 MG tablet Take 1 tablet by mouth 2 times daily (with meals) 60 tablet 3    atorvastatin (LIPITOR) 40 MG tablet Take 1 tablet by mouth nightly 30 tablet 3    clopidogrel (PLAVIX) 75 MG tablet Take 1 tablet by mouth daily 30 tablet 3    vitamin B-1 100 MG tablet Take 1 tablet by mouth daily 30 tablet 3    acetaminophen (TYLENOL) 325 MG tablet Take 650 mg by mouth every 6 hours as needed for Pain      bisacodyl (DULCOLAX) 10 MG suppository Place 10 mg rectally daily      magnesium hydroxide (MILK OF MAGNESIA CONCENTRATE) 2400 MG/10ML SUSP Take 30 mLs by mouth daily as needed      collagenase 250 UNIT/GM ointment Apply topically daily Apply topically daily.  glucose (GLUTOSE) 40 % GEL Take 37.5 mLs by mouth as needed (hypoglycemia) 45 g 1    glucagon, rDNA, 1 MG injection Inject 1 mg into the muscle as needed for Low blood sugar (Blood glucose less than 70 mg/dL and patient NOT ALERT or NPO and does not have IV access. ) 1 each 0     No current facility-administered medications on file prior to encounter.        REVIEW OF SYSTEMS ROS : All others Negative if blank [], Positive if [x]  General Vascular   [] Fevers [] Claudication   [] Chills [] Rest Pain   Skin Neurologic   [x] Tissue Loss [x] Lower extremity neuropathy     Objective:    /64   Pulse 72   Temp 97 °F (36.1 °C) (Temporal)   Resp 16   Wt Readings from Last 3 Encounters:   08/23/21 207 lb (93.9 kg)   08/16/21 207 lb (93.9 kg)   08/16/21 182 lb (82.6 kg)       PHYSICAL EXAM   CONSTITUTIONAL:   Awake, alert, cooperative   PSYCHIATRIC :  Oriented to time, place and person      normal insight to disease process  EXTREMITIES:   R LE Open wounds are noted   Skin color is normal   Edema is not noted   Sensation deficit noted - to foot   Palpation of the foot does cause pain   5/5 strength DF/PF  L LE Open wounds are noted   Skin color is abnormal with hyperpigmentation   Edema is not noted   Sensation deficit noted - bilateral feet   Palpation of the foot does cause pain   5/5 strength DF/PF  R dorsalis pedis +2 L dorsalis pedis +2   R posterior tibial +2 L posterior tibial +2     Assessment:     Problem List Items Addressed This Visit     Diabetic foot infection ([facility] [address] - Primary    Relevant Orders    Initiate Outpatient Wound Care Protocol          Pre Debridement Measurements:  Are located in the [place]  Documentation Flow Sheet  Post Debridement Measurements:  Wound/Ulcer Descriptions are Pre Debridement except measurements:     Wound 12/26/20 Arm Left (Active)   Number of days: 267       Wound 02/15/21 Heel Left (Active)   Number of days: 217       Wound 02/15/21 Heel Right (Active)   Number of days: 217       Wound 03/08/21 Heel Left #1 left heel aquired 12/1/20 (Active)   Wound Image   08/30/21 1516   Dressing Status New dressing applied 08/30/21 1540   Wound Cleansed Cleansed with saline 08/30/21 1540   Dressing/Treatment Alginate;ABD;Dry dressing 08/30/21 1540   Offloading for Diabetic Foot Ulcers Post op shoe 08/30/21 1540   Wound Length (cm) 4.1 cm 09/20/21 1532 Assessment Maceration 04/01/21 1357   Number of days: 196     Incision 02/19/21 Groin Right (Active)   Number of days: 213       Procedure Note  Indications:  Based on my examination of this patient's wound(s)/ulcer(s) today, debridement is required to promote healing and evaluate the wound base. Performed by: Casandra Cui DPM    Consent obtained:  Yes    Time out taken:  Yes    Pain Control: Anesthetic  Anesthetic: 4% Lidocaine Liquid Topical     Debridement:Excisional Debridement    Using curette the wound(s)/ulcer(s) was/were sharply debrided down through and including the removal of subcutaneous tissue. Devitalized Tissue Debrided:  slough to stimulate bleeding to promote healing, post debridement good bleeding base and wound edges noted    Wound/Ulcer #: 2    Percent of Wound/Ulcer Debrided: 100%    Total Surface Area Debrided:  10.92 sq cm     Estimated Blood Loss:  None  Hemostasis Achieved:  by pressure    Procedural Pain:  2  / 10   Post Procedural Pain:  2 / 10     Response to treatment:  Well tolerated by patient. A culture was done. Plan:     Pt is a smoker   - Discussed relationship of smoking and negative affects on wound healing   - Emphasized importance of tobacco avoidace/cessation   - Script for nicotine patch given to patient   - Information regarding support groups for smoking cessation given    In my professional opinion and based off the information that is available at this time this patient has appropriate indication for HBO Therapy: Maybe    Treatment Note please see attached Discharge Instructions    Written patient dismissal instructions given to patient and signed by patient or POA.            Electronically signed by Casandra Cui DPM on 9/20/2021 at 3:49 PM

## 2021-09-22 LAB — ANAEROBIC CULTURE: NORMAL

## 2021-09-23 LAB
GRAM STAIN RESULT: ABNORMAL
ORGANISM: ABNORMAL
ORGANISM: ABNORMAL
WOUND/ABSCESS: ABNORMAL
WOUND/ABSCESS: ABNORMAL

## 2021-10-04 ENCOUNTER — HOSPITAL ENCOUNTER (OUTPATIENT)
Dept: WOUND CARE | Age: 61
Discharge: HOME | End: 2021-10-04
Payer: COMMERCIAL

## 2021-10-04 VITALS
HEIGHT: 71 IN | HEART RATE: 72 BPM | SYSTOLIC BLOOD PRESSURE: 132 MMHG | TEMPERATURE: 98.2 F | DIASTOLIC BLOOD PRESSURE: 72 MMHG | RESPIRATION RATE: 16 BRPM | BODY MASS INDEX: 28.98 KG/M2 | WEIGHT: 207 LBS

## 2021-10-04 DIAGNOSIS — E11.628 DIABETIC FOOT INFECTION (HCC): Primary | ICD-10-CM

## 2021-10-04 DIAGNOSIS — L08.9 DIABETIC FOOT INFECTION (HCC): Primary | ICD-10-CM

## 2021-10-04 PROCEDURE — 6370000000 HC RX 637 (ALT 250 FOR IP): Performed by: PODIATRIST

## 2021-10-04 PROCEDURE — 11042 DBRDMT SUBQ TIS 1ST 20SQCM/<: CPT

## 2021-10-04 RX ORDER — GENTAMICIN SULFATE 1 MG/G
OINTMENT TOPICAL ONCE
Status: CANCELLED | OUTPATIENT
Start: 2021-10-04 | End: 2021-10-04

## 2021-10-04 RX ORDER — BETAMETHASONE DIPROPIONATE 0.05 %
OINTMENT (GRAM) TOPICAL ONCE
Status: CANCELLED | OUTPATIENT
Start: 2021-10-04 | End: 2021-10-04

## 2021-10-04 RX ORDER — BACITRACIN, NEOMYCIN, POLYMYXIN B 400; 3.5; 5 [USP'U]/G; MG/G; [USP'U]/G
OINTMENT TOPICAL ONCE
Status: CANCELLED | OUTPATIENT
Start: 2021-10-04 | End: 2021-10-04

## 2021-10-04 RX ORDER — LIDOCAINE 40 MG/G
CREAM TOPICAL ONCE
Status: CANCELLED | OUTPATIENT
Start: 2021-10-04 | End: 2021-10-04

## 2021-10-04 RX ORDER — LIDOCAINE HYDROCHLORIDE 20 MG/ML
JELLY TOPICAL ONCE
Status: CANCELLED | OUTPATIENT
Start: 2021-10-04 | End: 2021-10-04

## 2021-10-04 RX ORDER — LIDOCAINE HYDROCHLORIDE 40 MG/ML
SOLUTION TOPICAL ONCE
Status: CANCELLED | OUTPATIENT
Start: 2021-10-04 | End: 2021-10-04

## 2021-10-04 RX ORDER — LIDOCAINE 50 MG/G
OINTMENT TOPICAL ONCE
Status: CANCELLED | OUTPATIENT
Start: 2021-10-04 | End: 2021-10-04

## 2021-10-04 RX ORDER — CLOBETASOL PROPIONATE 0.5 MG/G
OINTMENT TOPICAL ONCE
Status: CANCELLED | OUTPATIENT
Start: 2021-10-04 | End: 2021-10-04

## 2021-10-04 RX ORDER — GINSENG 100 MG
CAPSULE ORAL ONCE
Status: CANCELLED | OUTPATIENT
Start: 2021-10-04 | End: 2021-10-04

## 2021-10-04 RX ORDER — BACITRACIN ZINC AND POLYMYXIN B SULFATE 500; 1000 [USP'U]/G; [USP'U]/G
OINTMENT TOPICAL ONCE
Status: CANCELLED | OUTPATIENT
Start: 2021-10-04 | End: 2021-10-04

## 2021-10-04 RX ORDER — LIDOCAINE HYDROCHLORIDE 40 MG/ML
SOLUTION TOPICAL ONCE
Status: COMPLETED | OUTPATIENT
Start: 2021-10-04 | End: 2021-10-04

## 2021-10-04 RX ADMIN — LIDOCAINE HYDROCHLORIDE: 40 SOLUTION TOPICAL at 15:04

## 2021-10-04 NOTE — PROGRESS NOTES
Wound Healing Center  History and Physical/Consultation  Podiatry    Referring Physician : Archana Shelton MD  [address] [facility] RECORD NUMBER:  [id number]  AGE: 57 y.o. GENDER: male  : 1960  EPISODE DATE:  10/4/2021  Subjective:     Chief Complaint   Patient presents with    Wound Check     left foot ulcer         HISTORY of PRESENT ILLNESS HPI     Yung Patel is a 57 y.o. male who presents today for wound/ulcer evaluation. History of Wound Context:  The patient has had a wound of bilateral heels which was first noted approximately over a month ago. This has been treated with surgical debridement. On their initial visit to the wound healing center, 21 ,  the patient has noted that the wound has not been improving. The patient has had similar previous wounds in the past.      Pt is not on abx at time of initial visit. 3/22/21 - Wound VAC MWF continue to left heel. Aquacel Ag to right heel. Debrided toenails 1-5 in thickness and length. FU 1 week. IV Abx per Dr. Brandon Cárdenas. 21 - Wound VAC MWF to left heel. Cultures obtained. FU 1 week after visit with Dr. Brandon Cárdenas. PO Abx.  21 - DC wound VAC to left heel. Alginate to wound. FU 1 week  21 - Alginate and gentamicin ointment QOD. Partial WB to heel at 50% to foot with surgical shoe and walker. 5/3/21 - Alginate and gentamicin ointment QOD. MRI left heel    5/10/21 - Alginate and gentamicin ointment QOD. MRI left heel pending. 21 - Alginate and gentamicin ointment QOD. MRI reviewed confirming OM. Discussed HBOT referral and partial calcanectomy. He opts for HBOT consultation. 21 - Alginate and add gentamicin ointment QD. HBOT referral.  Patient hold off on partial calcanectomy. FU 1 week    21 - Alginate and add gentamicin ointment QD. HBOT referral.  Patient hold off on partial calcanectomy. FU 1 week     21 - Alginate and add gentamicin ointment QD.   HBOT referral. Patient hold off on partial calcanectomy. FU 1 week. Patient gave me consent (verbal) to speak with his brother Sridhar Lorenz regarding his at home arrangement. HBOT is on hold until patient is at home. 7/12/21 -  Alginate and add gentamicin ointment QD. HBOT referral.  Patient considering partial calcanectomy with flap. FU 1 week. 7/19/21 - Alginate and add gentamicin ointment QD. HBOT referral.  Patient considering partial calcanectomy with flap. FU 1 week. 8/2/21 - Alginate and add gentamicin ointment QD. HBOT referral.  Patient considering partial calcanectomy with flap. FU 1 week. 8/16/21 - Alginate and add gentamicin ointment QD. HBOT referral.  Patient considering partial calcanectomy with flap. FU 1 week. 8/23/21 - Alginate and add gentamicin ointment QD. HBOT referral.  Patient considering partial calcanectomy with flap. FU 1 week. 8/30/21 - Alginate and add gentamicin ointment QD. HBOT referral.  Patient considering partial calcanectomy with flap. FU 1 week. 9/20/21 - Alginate and add gentamicin ointment QD. HBOT referral.  Patient considering partial calcanectomy with flap. FU 1 week. 10/4/21 - Alginate and add gentamicin ointment QD. HBOT referral.  Patient considering partial calcanectomy with flap. FU 1 week.        Wound/Ulcer Pain Timing/Severity: none  Quality of pain: N/A  Severity:  0 / 10   Modifying Factors: None  Associated Signs/Symptoms: none    Ulcer Identification:  Ulcer Type: arterial and diabetic  Contributing Factors: diabetes    Diabetic/Pressure/Non Pressure Ulcers onl y:  Ulcer: Diabetic ulcer, fat layer exposed    If patient has diabetic lower extremity wounds  Kong Classification of diabetic lower extremity wounds:    Grade Description   []  0 No open wound   []  1 Superficial ulcer involving the full skin thickness   [x]  2 Deep ulcer involves ligament, tendon, joint capsule, or fascia  No bone involvement or abscess presence   []  3 Deep Ulcer with abcess formation and/or osteomyelitis   []  4 Localized gangrene   []  5 Extensive gangrene of the foot     Wound: N/A        PAST MEDICAL HISTORY      Diagnosis Date    Cerebral artery occlusion with cerebral infarction ([facility] [address]     Diabetes mellitus ([facility] [address]     Type 2    Hemiparesis affecting left side as late effect of cerebrovascular accident ([facility] [address]     LEFT SIDE NON DOMINANT FOLLOWING STROKE    Hypertension      Past Surgical History:   Procedure Laterality Date    FINGER AMPUTATION      FOOT DEBRIDEMENT Left 2/16/2021    LEFT FOOT DEBRIDEMENT WITH BONE BIOPSY, WOUND VAC APPLICATION performed by Veronica Neves DPM at [place] [address] ARTHROSCOPY      TONSILLECTOMY      TRANSESOPHAGEAL ECHOCARDIOGRAM N/A 2/22/2021    TRANSESOPHAGEAL ECHOCARDIOGRAM WITH BUBBLE STUDY performed by Lon Castillo MD at [facility] 1/4/2021    EGD BIOPSY performed by Octavio Mark DO at [place] [address] reviewed. No pertinent family history. Social History     Tobacco Use    Smoking status: Former Smoker    Smokeless tobacco: Never Used   Vaping Use    Vaping Use: Never used   Substance Use Topics    Alcohol use: Not Currently     Comment: Former every day drinker for most of adult life    Drug use: No     Allergies   Allergen Reactions    Pcn [Penicillins]      Current Outpatient Medications on File Prior to Encounter   Medication Sig Dispense Refill    ciprofloxacin (CIPRO) 500 MG tablet Take 500 mg by mouth 2 times daily      gabapentin (NEURONTIN) 100 MG capsule Take 100 mg by mouth 3 times daily.        B Complex-C-E-Zn (STRESS B/ZINC) TABS Take 1 tablet by mouth daily      acetaminophen (TYLENOL) 325 MG tablet Take 650 mg by mouth every 6 hours as needed for Pain      bisacodyl (DULCOLAX) 10 MG suppository Place 10 mg rectally daily      ferrous sulfate (IRON 325) 325 (65 Fe) MG tablet Take 325 mg by mouth daily (with breakfast)      mg by mouth nightly as needed       No current facility-administered medications on file prior to encounter.        REVIEW OF SYSTEMS   ROS : All others Negative if blank [], Positive if [x]  General Vascular   [] Fevers [] Claudication   [] Chills [] Rest Pain   Skin Neurologic   [x] Tissue Loss [x] Lower extremity neuropathy     Objective:    /72   Pulse 72   Temp 98.2 °F (36.8 °C) (Temporal)   Resp 16   Ht 5' 11\" (1.803 m)   Wt 207 lb (93.9 kg)   BMI 28.87 kg/m²   Wt Readings from Last 3 Encounters:   10/04/21 207 lb (93.9 kg)   08/23/21 207 lb (93.9 kg)   08/16/21 207 lb (93.9 kg)       PHYSICAL EXAM   CONSTITUTIONAL:   Awake, alert, cooperative   PSYCHIATRIC :  Oriented to time, place and person      normal insight to disease process  EXTREMITIES:   R LE Open wounds are noted   Skin color is normal   Edema is not noted   Sensation deficit noted - to foot   Palpation of the foot does cause pain   5/5 strength DF/PF  L LE Open wounds are noted   Skin color is abnormal with hyperpigmentation   Edema is not noted   Sensation deficit noted - bilateral feet   Palpation of the foot does cause pain   5/5 strength DF/PF  R dorsalis pedis +2 L dorsalis pedis +2   R posterior tibial +2 L posterior tibial +2     Assessment:     Problem List Items Addressed This Visit     Diabetic foot infection ([facility] [address] - Primary    Relevant Orders    Initiate Outpatient Wound Care Protocol          Pre Debridement Measurements:  Are located in the [place]  Documentation Flow Sheet  Post Debridement Measurements:  Wound/Ulcer Descriptions are Pre Debridement except measurements:     Wound 12/26/20 Arm Left (Active)   Number of days: 281       Wound 02/15/21 Heel Left (Active)   Number of days: 231       Wound 02/15/21 Heel Right (Active)   Number of days: 231       Wound 03/08/21 Heel Left #1 left heel aquired 12/1/20 (Active)   Wound Image   10/04/21 1456   Dressing Status New dressing applied 10/04/21 1537   Wound Cleansed Cleansed with saline 10/04/21 1537   Dressing/Treatment Alginate;ABD;Dry dressing 10/04/21 1537   Offloading for Diabetic Foot Ulcers Post op shoe 10/04/21 1537   Wound Length (cm) 3.5 cm 10/04/21 1456   Wound Width (cm) 2.8 cm 10/04/21 1456   Wound Depth (cm) 0.9 cm 10/04/21 1456   Wound Surface Area (cm^2) 9.8 cm^2 10/04/21 1456   Change in Wound Size % (l*w) 18.94 10/04/21 1456   Wound Volume (cm^3) 8.82 cm^3 10/04/21 1456   Wound Healing % -4 10/04/21 1456   Post-Procedure Length (cm) 3.6 cm 10/04/21 1526   Post-Procedure Width (cm) 2.9 cm 10/04/21 1526   Post-Procedure Depth (cm) 1 cm 10/04/21 1526   Post-Procedure Surface Area (cm^2) 10.44 cm^2 10/04/21 1526   Post-Procedure Volume (cm^3) 10.44 cm^3 10/04/21 1526   Undermining Starts ___ O'Clock 6 07/19/21 1054   Undermining Ends___ O'Clock 3 07/19/21 1054   Undermining Maxium Distance (cm) 0 10/04/21 1456   Wound Assessment Fibrinous; [facility] 10/04/21 1456   Drainage Amount Moderate 10/04/21 1456   Drainage Description Yellow 10/04/21 1456   Odor None 10/04/21 1456   Adrianne-wound Assessment Maceration 10/04/21 1456   Number of days: 210       Wound 03/08/21 Heel Right #2 rt heel aquired 12/1/20 (Active)   Wound Image   04/05/21 0929   Dressing Status New dressing applied 03/18/21 1352   Wound Cleansed Cleansed with saline 04/01/21 1536   Dressing/Treatment Alginate;Collagen;Silicone border 92/71/36 1536   Offloading for Diabetic Foot Ulcers Post op shoe 04/01/21 1536   Wound Length (cm) 0 cm 04/05/21 0929   Wound Width (cm) 0 cm 04/05/21 0929   Wound Depth (cm) 0 cm 04/05/21 0929   Wound Surface Area (cm^2) 0 cm^2 04/05/21 0929   Change in Wound Size % (l*w) 100 04/05/21 0929   Wound Volume (cm^3) 0 cm^3 04/05/21 0929   Wound Healing % 100 04/05/21 0929   Post-Procedure Length (cm) 0 cm 04/05/21 1011   Post-Procedure Width (cm) 0 cm 04/05/21 1011   Post-Procedure Depth (cm) 0 cm 04/05/21 1011   Post-Procedure Surface Area (cm^2) 0 cm^2 04/05/21 1011 Post-Procedure Volume (cm^3) 0 cm^3 04/05/21 1011   Wound Assessment Fibrin 04/01/21 1357   Drainage Amount None 04/01/21 1357   Drainage Description Yellow 03/22/21 0911   Odor None 04/01/21 1357   Adrianne-wound Assessment Maceration 04/01/21 1357   Number of days: 210     Incision 02/19/21 Groin Right (Active)   Number of days: 227       Procedure Note  Indications:  Based on my examination of this patient's wound(s)/ulcer(s) today, debridement is required to promote healing and evaluate the wound base. Performed by: Jaye Copeland DPM    Consent obtained:  Yes    Time out taken:  Yes    Pain Control: Anesthetic  Anesthetic: 4% Lidocaine Liquid Topical     Debridement:Excisional Debridement    Using curette the wound(s)/ulcer(s) was/were sharply debrided down through and including the removal of subcutaneous tissue. Devitalized Tissue Debrided:  slough to stimulate bleeding to promote healing, post debridement good bleeding base and wound edges noted    Wound/Ulcer #: 2    Percent of Wound/Ulcer Debrided: 100%    Total Surface Area Debrided:  10.44 sq cm     Estimated Blood Loss:  None  Hemostasis Achieved:  by pressure    Procedural Pain:  2  / 10   Post Procedural Pain:  2 / 10     Response to treatment:  Well tolerated by patient. A culture was done. Plan:     Pt is a smoker   - Discussed relationship of smoking and negative affects on wound healing   - Emphasized importance of tobacco avoidace/cessation   - Script for nicotine patch given to patient   - Information regarding support groups for smoking cessation given    In my professional opinion and based off the information that is available at this time this patient has appropriate indication for HBO Therapy: Maybe    Treatment Note please see attached Discharge Instructions    Written patient dismissal instructions given to patient and signed by patient or POA.            Electronically signed by Jaye Copeland DPM on 10/4/2021 at 3:51 PM

## 2021-10-18 ENCOUNTER — TELEPHONE (OUTPATIENT)
Dept: HYPERBARIC MEDICINE | Age: 61
End: 2021-10-18

## 2021-10-18 ENCOUNTER — HOSPITAL ENCOUNTER (OUTPATIENT)
Dept: WOUND CARE | Age: 61
Discharge: HOME | End: 2021-10-18
Payer: COMMERCIAL

## 2021-10-18 VITALS
BODY MASS INDEX: 28.98 KG/M2 | TEMPERATURE: 97.4 F | WEIGHT: 207 LBS | SYSTOLIC BLOOD PRESSURE: 120 MMHG | DIASTOLIC BLOOD PRESSURE: 60 MMHG | RESPIRATION RATE: 16 BRPM | HEART RATE: 76 BPM | HEIGHT: 71 IN

## 2021-10-18 DIAGNOSIS — L08.9 DIABETIC FOOT INFECTION (HCC): Primary | ICD-10-CM

## 2021-10-18 DIAGNOSIS — E11.628 DIABETIC FOOT INFECTION (HCC): Primary | ICD-10-CM

## 2021-10-18 PROCEDURE — 6370000000 HC RX 637 (ALT 250 FOR IP): Performed by: PODIATRIST

## 2021-10-18 PROCEDURE — 11042 DBRDMT SUBQ TIS 1ST 20SQCM/<: CPT

## 2021-10-18 RX ORDER — LIDOCAINE HYDROCHLORIDE 40 MG/ML
SOLUTION TOPICAL ONCE
Status: COMPLETED | OUTPATIENT
Start: 2021-10-18 | End: 2021-10-18

## 2021-10-18 RX ORDER — BACITRACIN ZINC AND POLYMYXIN B SULFATE 500; 1000 [USP'U]/G; [USP'U]/G
OINTMENT TOPICAL ONCE
Status: CANCELLED | OUTPATIENT
Start: 2021-10-18 | End: 2021-10-18

## 2021-10-18 RX ORDER — LIDOCAINE 50 MG/G
OINTMENT TOPICAL ONCE
Status: CANCELLED | OUTPATIENT
Start: 2021-10-18 | End: 2021-10-18

## 2021-10-18 RX ORDER — BETAMETHASONE DIPROPIONATE 0.05 %
OINTMENT (GRAM) TOPICAL ONCE
Status: CANCELLED | OUTPATIENT
Start: 2021-10-18 | End: 2021-10-18

## 2021-10-18 RX ORDER — CLOBETASOL PROPIONATE 0.5 MG/G
OINTMENT TOPICAL ONCE
Status: CANCELLED | OUTPATIENT
Start: 2021-10-18 | End: 2021-10-18

## 2021-10-18 RX ORDER — LIDOCAINE HYDROCHLORIDE 20 MG/ML
JELLY TOPICAL ONCE
Status: CANCELLED | OUTPATIENT
Start: 2021-10-18 | End: 2021-10-18

## 2021-10-18 RX ORDER — LIDOCAINE 40 MG/G
CREAM TOPICAL ONCE
Status: CANCELLED | OUTPATIENT
Start: 2021-10-18 | End: 2021-10-18

## 2021-10-18 RX ORDER — GENTAMICIN SULFATE 1 MG/G
OINTMENT TOPICAL ONCE
Status: CANCELLED | OUTPATIENT
Start: 2021-10-18 | End: 2021-10-18

## 2021-10-18 RX ORDER — BACITRACIN, NEOMYCIN, POLYMYXIN B 400; 3.5; 5 [USP'U]/G; MG/G; [USP'U]/G
OINTMENT TOPICAL ONCE
Status: CANCELLED | OUTPATIENT
Start: 2021-10-18 | End: 2021-10-18

## 2021-10-18 RX ORDER — LIDOCAINE HYDROCHLORIDE 40 MG/ML
SOLUTION TOPICAL ONCE
Status: CANCELLED | OUTPATIENT
Start: 2021-10-18 | End: 2021-10-18

## 2021-10-18 RX ORDER — GINSENG 100 MG
CAPSULE ORAL ONCE
Status: CANCELLED | OUTPATIENT
Start: 2021-10-18 | End: 2021-10-18

## 2021-10-18 RX ADMIN — LIDOCAINE HYDROCHLORIDE: 40 SOLUTION TOPICAL at 15:09

## 2021-10-18 NOTE — PROGRESS NOTES
[facility]  History and Physical/Consultation  Podiatry    Referring Physician : Ivan Smith MD  [id number] RECORD NUMBER:  [id number]  AGE: 57 y.o. GENDER: male  : 1960  EPISODE DATE:  10/18/2021  Subjective:     Chief Complaint   Patient presents with    Wound Check     left heel ulcer         HISTORY of PRESENT ILLNESS HPI     Aaron Weston is a 57 y.o. male who presents today for wound/ulcer evaluation. History of Wound Context:  The patient has had a wound of bilateral heels which was first noted approximately over a month ago. This has been treated with surgical debridement. On their initial visit to the wound healing center, 21 ,  the patient has noted that the wound has not been improving. The patient has had similar previous wounds in the past.      Pt is not on abx at time of initial visit. 3/22/21 - Wound VAC MWF continue to left heel. Aquacel Ag to right heel. Debrided toenails 1-5 in thickness and length. FU 1 week. IV Abx per Dr. Pramod Irving. 21 - Wound VAC MWF to left heel. Cultures obtained. FU 1 week after visit with Dr. Pramod Irving. PO Abx.  21 - DC wound VAC to left heel. Alginate to wound. FU 1 week  21 - Alginate and gentamicin ointment QOD. Partial WB to heel at 50% to foot with surgical shoe and walker. 5/3/21 - Alginate and gentamicin ointment QOD. MRI left heel    5/10/21 - Alginate and gentamicin ointment QOD. MRI left heel pending. 21 - Alginate and gentamicin ointment QOD. MRI reviewed confirming OM. Discussed HBOT referral and partial calcanectomy. He opts for HBOT consultation. 21 - Alginate and add gentamicin ointment QD. HBOT referral.  Patient hold off on partial calcanectomy. FU 1 week    21 - Alginate and add gentamicin ointment QD. HBOT referral.  Patient hold off on partial calcanectomy. FU 1 week     21 - Alginate and add gentamicin ointment QD.   HBOT referral. Patient hold off on partial calcanectomy. FU 1 week. Patient gave me consent (verbal) to speak with his brother Lee Ann Hendricks regarding his at home arrangement. HBOT is on hold until patient is at home. 7/12/21 -  Alginate and add gentamicin ointment QD. HBOT referral.  Patient considering partial calcanectomy with flap. FU 1 week. 7/19/21 - Alginate and add gentamicin ointment QD. HBOT referral.  Patient considering partial calcanectomy with flap. FU 1 week. 8/2/21 - Alginate and add gentamicin ointment QD. HBOT referral.  Patient considering partial calcanectomy with flap. FU 1 week. 8/16/21 - Alginate and add gentamicin ointment QD. HBOT referral.  Patient considering partial calcanectomy with flap. FU 1 week. 8/23/21 - Alginate and add gentamicin ointment QD. HBOT referral.  Patient considering partial calcanectomy with flap. FU 1 week. 8/30/21 - Alginate and add gentamicin ointment QD. HBOT referral.  Patient considering partial calcanectomy with flap. FU 1 week. 9/20/21 - Alginate and add gentamicin ointment QD. HBOT referral.  Patient considering partial calcanectomy with flap. FU 1 week. 10/4/21 - Alginate and add gentamicin ointment QD. HBOT referral.  Patient considering partial calcanectomy with flap. FU 1 week. 10/18/21 - Alginate and add gentamicin ointment QD. HBOT referral.  Patient considering partial calcanectomy with flap. FU 1 week.        Wound/Ulcer Pain Timing/Severity: none  Quality of pain: N/A  Severity:  0 / 10   Modifying Factors: None  Associated Signs/Symptoms: none    Ulcer Identification:  Ulcer Type: arterial and diabetic  Contributing Factors: diabetes    Diabetic/Pressure/Non Pressure Ulcers onl y:  Ulcer: Diabetic ulcer, fat layer exposed    If patient has diabetic lower extremity wounds  Kong Classification of diabetic lower extremity wounds:    Grade Description   []  0 No open wound   []  1 Superficial ulcer involving the full skin thickness   [x]  2 Deep ulcer involves ligament, tendon, joint capsule, or fascia  No bone involvement or abscess presence   []  3 Deep Ulcer with abcess formation and/or osteomyelitis   []  4 Localized gangrene   []  5 Extensive gangrene of the foot     Wound: N/A        PAST MEDICAL HISTORY      Diagnosis Date    Cerebral artery occlusion with cerebral infarction ([facility]     Diabetes mellitus ([facility]     Type 2    Hemiparesis affecting left side as late effect of cerebrovascular accident ([facility]     LEFT SIDE NON DOMINANT FOLLOWING STROKE    Hypertension      Past Surgical History:   Procedure Laterality Date    FINGER AMPUTATION      FOOT DEBRIDEMENT Left 2/16/2021    LEFT FOOT DEBRIDEMENT WITH BONE BIOPSY, WOUND VAC APPLICATION performed by Kwame Arriola DPM at [place] [address] ARTHROSCOPY      TONSILLECTOMY      TRANSESOPHAGEAL ECHOCARDIOGRAM N/A 2/22/2021    TRANSESOPHAGEAL ECHOCARDIOGRAM WITH BUBBLE STUDY performed by Salazar Greene MD at [address] [place] [address] N/A 1/4/2021    EGD BIOPSY performed by Abdirahman Batista DO at [place] [address] reviewed. No pertinent family history. Social History     Tobacco Use    Smoking status: Former Smoker    Smokeless tobacco: Never Used   Vaping Use    Vaping Use: Never used   Substance Use Topics    Alcohol use: Not Currently     Comment: Former every day drinker for most of adult life    Drug use: No     Allergies   Allergen Reactions    Pcn [Penicillins]      Current Outpatient Medications on File Prior to Encounter   Medication Sig Dispense Refill    ciprofloxacin (CIPRO) 500 MG tablet Take 500 mg by mouth 2 times daily      gabapentin (NEURONTIN) 100 MG capsule Take 100 mg by mouth 3 times daily.        doxycycline hyclate (VIBRAMYCIN) 100 MG capsule Take 100 mg by mouth 2 times daily      B Complex-C-E-Zn (STRESS B/ZINC) TABS Take 1 tablet by mouth daily      bisacodyl (DULCOLAX) 10 MG suppository Place 10 mg rectally daily      ferrous sulfate (IRON 325) 325 (65 Fe) MG tablet Take 325 mg by mouth daily (with breakfast)      collagenase 250 UNIT/GM ointment Apply topically daily Apply topically daily.       ascorbic acid (VITAMIN C) 500 MG tablet Take 500 mg by mouth daily      Cholecalciferol (VITAMIN D3) 1.25 MG (25063 UT) CAPS Take by mouth      zinc gluconate 50 MG tablet Take 50 mg by mouth daily      vitamin D (ERGOCALCIFEROL) 1.25 MG (07965 UT) CAPS capsule Take 1 capsule by mouth once a week 5 capsule 0    ipratropium-albuterol (DUONEB) 0.5-2.5 (3) MG/3ML SOLN nebulizer solution Inhale 3 mLs into the lungs every 4 hours 360 mL 0    pantoprazole (PROTONIX) 40 MG tablet Take 1 tablet by mouth every morning (before breakfast) 30 tablet 3    melatonin 3 MG TABS tablet Take 9 mg by mouth nightly as needed      aspirin 81 MG EC tablet Take 81 mg by mouth daily      hydroCHLOROthiazide (HYDRODIURIL) 25 MG tablet Take 25 mg by mouth daily      glimepiride (AMARYL) 2 MG tablet Take 1 tablet by mouth daily (with breakfast) 30 tablet 3    insulin lispro (HUMALOG) 100 UNIT/ML injection vial Inject 0-12 Units into the skin 3 times daily (with meals) 1 vial 3    insulin lispro (HUMALOG) 100 UNIT/ML injection vial Inject 0-6 Units into the skin nightly 1 vial 3    metFORMIN (GLUCOPHAGE) 500 MG tablet Take 1 tablet by mouth 2 times daily (with meals) 60 tablet 3    atorvastatin (LIPITOR) 40 MG tablet Take 1 tablet by mouth nightly 30 tablet 3    clopidogrel (PLAVIX) 75 MG tablet Take 1 tablet by mouth daily 30 tablet 3    vitamin B-1 100 MG tablet Take 1 tablet by mouth daily 30 tablet 3    acetaminophen (TYLENOL) 325 MG tablet Take 650 mg by mouth every 6 hours as needed for Pain      magnesium hydroxide (MILK OF MAGNESIA CONCENTRATE) 2400 MG/10ML SUSP Take 30 mLs by mouth daily as needed      glucose (GLUTOSE) 40 % GEL Take 37.5 mLs by mouth as needed (hypoglycemia) 45 g 1    glucagon, rDNA, 1 MG injection Inject 1 mg into the muscle as needed for Low blood sugar (Blood glucose less than 70 mg/dL and patient NOT ALERT or NPO and does not have IV access. ) 1 each 0     No current facility-administered medications on file prior to encounter.        REVIEW OF SYSTEMS   ROS : All others Negative if blank [], Positive if [x]  General Vascular   [] Fevers [] Claudication   [] Chills [] Rest Pain   Skin Neurologic   [x] Tissue Loss [x] Lower extremity neuropathy     Objective:    /60   Pulse 76   Temp 97.4 °F (36.3 °C) (Temporal)   Resp 16   Ht 5' 11\" (1.803 m)   Wt 207 lb (93.9 kg)   BMI 28.87 kg/m²   Wt Readings from Last 3 Encounters:   10/18/21 207 lb (93.9 kg)   10/04/21 207 lb (93.9 kg)   08/23/21 207 lb (93.9 kg)       PHYSICAL EXAM   CONSTITUTIONAL:   Awake, alert, cooperative   PSYCHIATRIC :  Oriented to time, place and person      normal insight to disease process  EXTREMITIES:   R LE Open wounds are noted   Skin color is normal   Edema is not noted   Sensation deficit noted - to foot   Palpation of the foot does cause pain   5/5 strength DF/PF  L LE Open wounds are noted   Skin color is abnormal with hyperpigmentation   Edema is not noted   Sensation deficit noted - bilateral feet   Palpation of the foot does cause pain   5/5 strength DF/PF  R dorsalis pedis +2 L dorsalis pedis +2   R posterior tibial +2 L posterior tibial +2     Assessment:     Problem List Items Addressed This Visit     Diabetic foot infection ([facility] - Primary    Relevant Orders    Initiate Outpatient Wound Care Protocol          Pre Debridement Measurements:  Are located in the [facility]  Documentation Flow Sheet  Post Debridement Measurements:  Wound/Ulcer Descriptions are Pre Debridement except measurements:     Wound 12/26/20 Arm Left (Active)   Number of days: 295       Wound 02/15/21 Heel Left (Active)   Number of days: 245       Wound 02/15/21 Heel Right (Active)   Number of days: 245       Wound 03/08/21 Heel Left #1 left heel aquired 12/1/20 (Active)   Wound Image   10/04/21 1456   Dressing Status New dressing applied 10/04/21 1537   Wound Cleansed Cleansed with saline 10/04/21 1537   Dressing/Treatment Alginate;ABD;Dry dressing 10/04/21 1537   Offloading for Diabetic Foot Ulcers Post op shoe 10/04/21 1537   Wound Length (cm) 4 cm 10/18/21 1510   Wound Width (cm) 2.7 cm 10/18/21 1510   Wound Depth (cm) 0.5 cm 10/18/21 1510   Wound Surface Area (cm^2) 10.8 cm^2 10/18/21 1510   Change in Wound Size % (l*w) 10.67 10/18/21 1510   Wound Volume (cm^3) 5.4 cm^3 10/18/21 1510   Wound Healing % 36 10/18/21 1510   Post-Procedure Length (cm) 4.1 cm 10/18/21 1519   Post-Procedure Width (cm) 2.8 cm 10/18/21 1519   Post-Procedure Depth (cm) 0.6 cm 10/18/21 1519   Post-Procedure Surface Area (cm^2) 11.48 cm^2 10/18/21 1519   Post-Procedure Volume (cm^3) 6.888 cm^3 10/18/21 1519   Undermining Starts ___ O'Clock 6 07/19/21 1054   Undermining Ends___ O'Clock 3 07/19/21 1054   Undermining Maxium Distance (cm) 0 10/04/21 1456   Wound Assessment Fibrin;Pale granulation tissue;Pink/red 10/18/21 1510   Drainage Amount Moderate 10/18/21 1510   Drainage Description Yellow 10/18/21 1510   Odor None 10/18/21 1510   Adrianne-wound Assessment Maceration 10/18/21 1510   Number of days: 224       Wound 03/08/21 Heel Right #2 rt heel aquired 12/1/20 (Active)   Wound Image   04/05/21 0929   Dressing Status New dressing applied 03/18/21 1352   Wound Cleansed Cleansed with saline 04/01/21 1536   Dressing/Treatment Alginate;Collagen;Silicone border 56/30/71 1536   Offloading for Diabetic Foot Ulcers Post op shoe 04/01/21 1536   Wound Length (cm) 0 cm 04/05/21 0929   Wound Width (cm) 0 cm 04/05/21 0929   Wound Depth (cm) 0 cm 04/05/21 0929   Wound Surface Area (cm^2) 0 cm^2 04/05/21 0929   Change in Wound Size % (l*w) 100 04/05/21 0929   Wound Volume (cm^3) 0 cm^3 04/05/21 0929   Wound Healing % 100 04/05/21 0929   Post-Procedure Length (cm) 0 cm 04/05/21 1011 Post-Procedure Width (cm) 0 cm 04/05/21 1011   Post-Procedure Depth (cm) 0 cm 04/05/21 1011   Post-Procedure Surface Area (cm^2) 0 cm^2 04/05/21 1011   Post-Procedure Volume (cm^3) 0 cm^3 04/05/21 1011   Wound Assessment Fibrin 04/01/21 1357   Drainage Amount None 04/01/21 1357   Drainage Description Yellow 03/22/21 0911   Odor None 04/01/21 1357   Adrianne-wound Assessment Maceration 04/01/21 1357   Number of days: 224     Incision 02/19/21 Groin Right (Active)   Number of days: 241       Procedure Note  Indications:  Based on my examination of this patient's wound(s)/ulcer(s) today, debridement is required to promote healing and evaluate the wound base. Performed by: Jaye Copeland DPM    Consent obtained:  Yes    Time out taken:  Yes    Pain Control: Anesthetic  Anesthetic: 4% Lidocaine Liquid Topical     Debridement:Excisional Debridement    Using curette the wound(s)/ulcer(s) was/were sharply debrided down through and including the removal of subcutaneous tissue. Devitalized Tissue Debrided:  slough to stimulate bleeding to promote healing, post debridement good bleeding base and wound edges noted    Wound/Ulcer #: 2    Percent of Wound/Ulcer Debrided: 100%    Total Surface Area Debrided:  11.48 sq cm     Estimated Blood Loss:  None  Hemostasis Achieved:  by pressure    Procedural Pain:  2  / 10   Post Procedural Pain:  2 / 10     Response to treatment:  Well tolerated by patient. A culture was done.       Plan:     Pt is a smoker   - Discussed relationship of smoking and negative affects on wound healing   - Emphasized importance of tobacco avoidace/cessation   - Script for nicotine patch given to patient   - Information regarding support groups for smoking cessation given    In my professional opinion and based off the information that is available at this time this patient has appropriate indication for HBO Therapy: Maybe    Treatment Note please see attached Discharge Instructions    Written patient dismissal instructions given to patient and signed by patient or POA.            Electronically signed by Heladio Ceballos DPM on 10/18/2021 at 3:28 PM

## 2021-10-25 ENCOUNTER — HOSPITAL ENCOUNTER (OUTPATIENT)
Dept: WOUND CARE | Age: 61
Discharge: HOME | End: 2021-10-25
Payer: COMMERCIAL

## 2021-10-25 VITALS
TEMPERATURE: 98.3 F | HEIGHT: 71 IN | RESPIRATION RATE: 16 BRPM | BODY MASS INDEX: 28.98 KG/M2 | DIASTOLIC BLOOD PRESSURE: 64 MMHG | SYSTOLIC BLOOD PRESSURE: 112 MMHG | HEART RATE: 72 BPM | WEIGHT: 207 LBS

## 2021-10-25 DIAGNOSIS — L08.9 DIABETIC FOOT INFECTION (HCC): Primary | ICD-10-CM

## 2021-10-25 DIAGNOSIS — E11.628 DIABETIC FOOT INFECTION (HCC): Primary | ICD-10-CM

## 2021-10-25 PROCEDURE — 11042 DBRDMT SUBQ TIS 1ST 20SQCM/<: CPT

## 2021-10-25 PROCEDURE — 6370000000 HC RX 637 (ALT 250 FOR IP): Performed by: PODIATRIST

## 2021-10-25 RX ORDER — DIPHENHYDRAMINE HCL 25 MG
25 CAPSULE ORAL EVERY 6 HOURS PRN
COMMUNITY

## 2021-10-25 RX ORDER — LIDOCAINE 50 MG/G
OINTMENT TOPICAL ONCE
Status: CANCELLED | OUTPATIENT
Start: 2021-10-25 | End: 2021-10-25

## 2021-10-25 RX ORDER — BETAMETHASONE DIPROPIONATE 0.05 %
OINTMENT (GRAM) TOPICAL ONCE
Status: CANCELLED | OUTPATIENT
Start: 2021-10-25 | End: 2021-10-25

## 2021-10-25 RX ORDER — LIDOCAINE HYDROCHLORIDE 20 MG/ML
JELLY TOPICAL ONCE
Status: CANCELLED | OUTPATIENT
Start: 2021-10-25 | End: 2021-10-25

## 2021-10-25 RX ORDER — LIDOCAINE 40 MG/G
CREAM TOPICAL ONCE
Status: CANCELLED | OUTPATIENT
Start: 2021-10-25 | End: 2021-10-25

## 2021-10-25 RX ORDER — BACITRACIN ZINC AND POLYMYXIN B SULFATE 500; 1000 [USP'U]/G; [USP'U]/G
OINTMENT TOPICAL ONCE
Status: CANCELLED | OUTPATIENT
Start: 2021-10-25 | End: 2021-10-25

## 2021-10-25 RX ORDER — LIDOCAINE HYDROCHLORIDE 40 MG/ML
SOLUTION TOPICAL ONCE
Status: CANCELLED | OUTPATIENT
Start: 2021-10-25 | End: 2021-10-25

## 2021-10-25 RX ORDER — CLOBETASOL PROPIONATE 0.5 MG/G
OINTMENT TOPICAL ONCE
Status: CANCELLED | OUTPATIENT
Start: 2021-10-25 | End: 2021-10-25

## 2021-10-25 RX ORDER — BACITRACIN, NEOMYCIN, POLYMYXIN B 400; 3.5; 5 [USP'U]/G; MG/G; [USP'U]/G
OINTMENT TOPICAL ONCE
Status: CANCELLED | OUTPATIENT
Start: 2021-10-25 | End: 2021-10-25

## 2021-10-25 RX ORDER — GENTAMICIN SULFATE 1 MG/G
OINTMENT TOPICAL ONCE
Status: CANCELLED | OUTPATIENT
Start: 2021-10-25 | End: 2021-10-25

## 2021-10-25 RX ORDER — LIDOCAINE HYDROCHLORIDE 40 MG/ML
SOLUTION TOPICAL ONCE
Status: COMPLETED | OUTPATIENT
Start: 2021-10-25 | End: 2021-10-25

## 2021-10-25 RX ORDER — GINSENG 100 MG
CAPSULE ORAL ONCE
Status: CANCELLED | OUTPATIENT
Start: 2021-10-25 | End: 2021-10-25

## 2021-10-25 RX ADMIN — LIDOCAINE HYDROCHLORIDE 8 ML: 40 SOLUTION TOPICAL at 15:56

## 2021-10-25 NOTE — PLAN OF CARE
Problem: Wound:  Goal: Will show signs of wound healing; wound closure and no evidence of infection  Description: Will show signs of wound healing; wound closure and no evidence of infection  Outcome: Ongoing     Problem: Blood Glucose:  Goal: Ability to maintain appropriate glucose levels will improve  Description: Ability to maintain appropriate glucose levels will improve  Outcome: Ongoing     Problem: Pain:  Goal: Pain level will decrease  Description: Pain level will decrease  Outcome: Ongoing  Goal: Control of chronic pain  Description: Control of chronic pain  Outcome: Ongoing

## 2021-10-25 NOTE — PROGRESS NOTES
Wound Healing Center  History and Physical/Consultation  Podiatry    Referring Physician : Peng Mckeon MD  [id number] [facility] RECORD NUMBER:  [id number]  AGE: 57 y.o. GENDER: male  : 1960  EPISODE DATE:  10/25/2021  Subjective:     Chief Complaint   Patient presents with    Wound Check     left heel         HISTORY of PRESENT ILLNESS HPI     Yovany Maria is a 57 y.o. male who presents today for wound/ulcer evaluation. History of Wound Context:  The patient has had a wound of bilateral heels which was first noted approximately over a month ago. This has been treated with surgical debridement. On their initial visit to the wound healing center, 21 ,  the patient has noted that the wound has not been improving. The patient has had similar previous wounds in the past.      Pt is not on abx at time of initial visit. 3/22/21 - Wound VAC MWF continue to left heel. Aquacel Ag to right heel. Debrided toenails 1-5 in thickness and length. FU 1 week. IV Abx per Dr. Robe Gunter. 21 - Wound VAC MWF to left heel. Cultures obtained. FU 1 week after visit with Dr. Robe Gunter. PO Abx.  21 - DC wound VAC to left heel. Alginate to wound. FU 1 week  21 - Alginate and gentamicin ointment QOD. Partial WB to heel at 50% to foot with surgical shoe and walker. 5/3/21 - Alginate and gentamicin ointment QOD. MRI left heel    5/10/21 - Alginate and gentamicin ointment QOD. MRI left heel pending. 21 - Alginate and gentamicin ointment QOD. MRI reviewed confirming OM. Discussed HBOT referral and partial calcanectomy. He opts for HBOT consultation. 21 - Alginate and add gentamicin ointment QD. HBOT referral.  Patient hold off on partial calcanectomy. FU 1 week    21 - Alginate and add gentamicin ointment QD. HBOT referral.  Patient hold off on partial calcanectomy. FU 1 week     21 - Alginate and add gentamicin ointment QD.   HBOT referral.  Patient hold off on partial calcanectomy. FU 1 week. Patient gave me consent (verbal) to speak with his brother Daquan Houser regarding his at home arrangement. HBOT is on hold until patient is at home. 7/12/21 -  Alginate and add gentamicin ointment QD. HBOT referral.  Patient considering partial calcanectomy with flap. FU 1 week. 7/19/21 - Alginate and add gentamicin ointment QD. HBOT referral.  Patient considering partial calcanectomy with flap. FU 1 week. 8/2/21 - Alginate and add gentamicin ointment QD. HBOT referral.  Patient considering partial calcanectomy with flap. FU 1 week. 8/16/21 - Alginate and add gentamicin ointment QD. HBOT referral.  Patient considering partial calcanectomy with flap. FU 1 week. 8/23/21 - Alginate and add gentamicin ointment QD. HBOT referral.  Patient considering partial calcanectomy with flap. FU 1 week. 8/30/21 - Alginate and add gentamicin ointment QD. HBOT referral.  Patient considering partial calcanectomy with flap. FU 1 week. 9/20/21 - Alginate and add gentamicin ointment QD. HBOT referral.  Patient considering partial calcanectomy with flap. FU 1 week. 10/25/21 Alginate and add gentamicin ointment QD. HBOT referral.  Patient considering partial calcanectomy with flap. FU 1 week.       Wound/Ulcer Pain Timing/Severity: none  Quality of pain: N/A  Severity:  0 / 10   Modifying Factors: None  Associated Signs/Symptoms: none    Ulcer Identification:  Ulcer Type: arterial and diabetic  Contributing Factors: diabetes    Diabetic/Pressure/Non Pressure Ulcers onl y:  Ulcer: Diabetic ulcer, fat layer exposed    If patient has diabetic lower extremity wounds  Kong Classification of diabetic lower extremity wounds:    Grade Description   []  0 No open wound   []  1 Superficial ulcer involving the full skin thickness   [x]  2 Deep ulcer involves ligament, tendon, joint capsule, or fascia  No bone involvement or abscess presence   []  3 Deep Ulcer tablet Take 1 tablet by mouth daily 30 tablet 3    vitamin B-1 100 MG tablet Take 1 tablet by mouth daily 30 tablet 3     No current facility-administered medications on file prior to encounter.        REVIEW OF SYSTEMS   ROS : All others Negative if blank [], Positive if [x]  General Vascular   [] Fevers [] Claudication   [] Chills [] Rest Pain   Skin Neurologic   [x] Tissue Loss [x] Lower extremity neuropathy     Objective:    /64   Pulse 72   Temp 98.3 °F (36.8 °C) (Temporal)   Resp 16   Ht 5' 11\" (1.803 m)   Wt 207 lb (93.9 kg)   BMI 28.87 kg/m²   Wt Readings from Last 3 Encounters:   10/25/21 207 lb (93.9 kg)   10/18/21 207 lb (93.9 kg)   10/04/21 207 lb (93.9 kg)       PHYSICAL EXAM   CONSTITUTIONAL:   Awake, alert, cooperative   PSYCHIATRIC :  Oriented to time, place and person      normal insight to disease process  EXTREMITIES:   R LE Open wounds are noted   Skin color is normal   Edema is not noted   Sensation deficit noted - to foot   Palpation of the foot does cause pain   5/5 strength DF/PF  L LE Open wounds are noted   Skin color is abnormal with hyperpigmentation   Edema is not noted   Sensation deficit noted - bilateral feet   Palpation of the foot does cause pain   5/5 strength DF/PF  R dorsalis pedis +2 L dorsalis pedis +2   R posterior tibial +2 L posterior tibial +2     Assessment:     Problem List Items Addressed This Visit     Diabetic foot infection ([facility] [address] - Primary    Relevant Orders    Initiate Outpatient Wound Care Protocol          Pre Debridement Measurements:  Are located in the [facility]  Documentation Flow Sheet  Post Debridement Measurements:  Wound/Ulcer Descriptions are Pre Debridement except measurements:     Wound 12/26/20 Arm Left (Active)   Number of days: 302       Wound 02/15/21 Heel Left (Active)   Number of days: 252       Wound 02/15/21 Heel Right (Active)   Number of days: 252       Wound 03/08/21 Heel Left #1 left heel aquired 12/1/20 (Active)   Wound Image 10/04/21 1456   Dressing Status New dressing applied 10/18/21 1519   Wound Cleansed Cleansed with saline 10/18/21 1519   Dressing/Treatment Alginate;ABD;Dry dressing;Roll gauze 10/18/21 1519   Offloading for Diabetic Foot Ulcers Post op shoe 10/04/21 1537   Wound Length (cm) 4.4 cm 10/25/21 1551   Wound Width (cm) 2.8 cm 10/25/21 1551   Wound Depth (cm) 0.7 cm 10/25/21 1551   Wound Surface Area (cm^2) 12.32 cm^2 10/25/21 1551   Change in Wound Size % (l*w) -1.9 10/25/21 1551   Wound Volume (cm^3) 8.624 cm^3 10/25/21 1551   Wound Healing % -2 10/25/21 1551   Post-Procedure Length (cm) 4.4 cm 10/25/21 1609   Post-Procedure Width (cm) 2.9 cm 10/25/21 1609   Post-Procedure Depth (cm) 0.8 cm 10/25/21 1609   Post-Procedure Surface Area (cm^2) 12.76 cm^2 10/25/21 1609   Post-Procedure Volume (cm^3) 10.208 cm^3 10/25/21 1609   Undermining Starts ___ O'Clock 6 07/19/21 1054   Undermining Ends___ O'Clock 3 07/19/21 1054   Undermining Maxium Distance (cm) 0 10/04/21 1456   Wound Assessment Pale granulation tissue;Fibrin 10/25/21 1551   Drainage Amount Large 10/25/21 1551   Drainage Description Yellow 10/25/21 1551   Odor None 10/25/21 1551   Adrianne-wound Assessment Maceration 10/25/21 1551   Number of days: 231       Wound 03/08/21 Heel Right #2 rt heel aquired 12/1/20 (Active)   Wound Image   04/05/21 0929   Dressing Status New dressing applied 03/18/21 1352   Wound Cleansed Cleansed with saline 04/01/21 1536   Dressing/Treatment Alginate;Collagen;Silicone border 35/29/79 1536   Offloading for Diabetic Foot Ulcers Post op shoe 04/01/21 1536   Wound Length (cm) 0 cm 04/05/21 0929   Wound Width (cm) 0 cm 04/05/21 0929   Wound Depth (cm) 0 cm 04/05/21 0929   Wound Surface Area (cm^2) 0 cm^2 04/05/21 0929   Change in Wound Size % (l*w) 100 04/05/21 0929   Wound Volume (cm^3) 0 cm^3 04/05/21 0929   Wound Healing % 100 04/05/21 0929   Post-Procedure Length (cm) 0 cm 04/05/21 1011   Post-Procedure Width (cm) 0 cm 04/05/21 1011 Post-Procedure Depth (cm) 0 cm 04/05/21 1011   Post-Procedure Surface Area (cm^2) 0 cm^2 04/05/21 1011   Post-Procedure Volume (cm^3) 0 cm^3 04/05/21 1011   Wound Assessment Fibrin 04/01/21 1357   Drainage Amount None 04/01/21 1357   Drainage Description Yellow 03/22/21 0911   Odor None 04/01/21 1357   Adrianne-wound Assessment Maceration 04/01/21 1357   Number of days: 231     Incision 02/19/21 Groin Right (Active)   Number of days: 248       Procedure Note  Indications:  Based on my examination of this patient's wound(s)/ulcer(s) today, debridement is required to promote healing and evaluate the wound base. Performed by: Lovely Novak DPM    Consent obtained:  Yes    Time out taken:  Yes    Pain Control: Anesthetic  Anesthetic: 4% Lidocaine Liquid Topical     Debridement:Excisional Debridement    Using curette the wound(s)/ulcer(s) was/were sharply debrided down through and including the removal of subcutaneous tissue. Devitalized Tissue Debrided:  slough to stimulate bleeding to promote healing, post debridement good bleeding base and wound edges noted    Wound/Ulcer #: 2    Percent of Wound/Ulcer Debrided: 100%    Total Surface Area Debrided:  12.76 sq cm     Estimated Blood Loss:  None  Hemostasis Achieved:  by pressure    Procedural Pain:  2  / 10   Post Procedural Pain:  2 / 10     Response to treatment:  Well tolerated by patient. A culture was done.       Plan:     Pt is a smoker   - Discussed relationship of smoking and negative affects on wound healing   - Emphasized importance of tobacco avoidace/cessation   - Script for nicotine patch given to patient   - Information regarding support groups for smoking cessation given    In my professional opinion and based off the information that is available at this time this patient has appropriate indication for HBO Therapy: Maybe    Treatment Note please see attached Discharge Instructions    Written patient dismissal instructions given to patient and signed by patient or POA.            Electronically signed by Vanessa Noguera DPM on 10/25/2021 at 4:13 PM

## 2021-10-28 ENCOUNTER — HOSPITAL ENCOUNTER (OUTPATIENT)
Dept: WOUND CARE | Age: 61
Discharge: HOME | End: 2021-10-28
Payer: COMMERCIAL

## 2021-10-28 ENCOUNTER — APPOINTMENT (OUTPATIENT)
Dept: WOUND CARE | Age: 61
End: 2021-10-28
Payer: COMMERCIAL

## 2021-10-28 VITALS
TEMPERATURE: 97.4 F | SYSTOLIC BLOOD PRESSURE: 140 MMHG | HEART RATE: 73 BPM | RESPIRATION RATE: 18 BRPM | WEIGHT: 207 LBS | BODY MASS INDEX: 28.98 KG/M2 | DIASTOLIC BLOOD PRESSURE: 62 MMHG | HEIGHT: 71 IN

## 2021-10-28 DIAGNOSIS — L08.9 DIABETIC FOOT INFECTION (HCC): Primary | ICD-10-CM

## 2021-10-28 DIAGNOSIS — E11.628 DIABETIC FOOT INFECTION (HCC): Primary | ICD-10-CM

## 2021-10-28 PROCEDURE — 99212 OFFICE O/P EST SF 10 MIN: CPT

## 2021-10-28 RX ORDER — LIDOCAINE 40 MG/G
CREAM TOPICAL ONCE
Status: CANCELLED | OUTPATIENT
Start: 2021-10-28 | End: 2021-10-28

## 2021-10-28 RX ORDER — BETAMETHASONE DIPROPIONATE 0.05 %
OINTMENT (GRAM) TOPICAL ONCE
Status: CANCELLED | OUTPATIENT
Start: 2021-10-28 | End: 2021-10-28

## 2021-10-28 RX ORDER — LIDOCAINE HYDROCHLORIDE 20 MG/ML
JELLY TOPICAL ONCE
Status: CANCELLED | OUTPATIENT
Start: 2021-10-28 | End: 2021-10-28

## 2021-10-28 RX ORDER — GINSENG 100 MG
CAPSULE ORAL ONCE
Status: CANCELLED | OUTPATIENT
Start: 2021-10-28 | End: 2021-10-28

## 2021-10-28 RX ORDER — CLOBETASOL PROPIONATE 0.5 MG/G
OINTMENT TOPICAL ONCE
Status: CANCELLED | OUTPATIENT
Start: 2021-10-28 | End: 2021-10-28

## 2021-10-28 RX ORDER — BACITRACIN ZINC AND POLYMYXIN B SULFATE 500; 1000 [USP'U]/G; [USP'U]/G
OINTMENT TOPICAL ONCE
Status: CANCELLED | OUTPATIENT
Start: 2021-10-28 | End: 2021-10-28

## 2021-10-28 RX ORDER — BACITRACIN, NEOMYCIN, POLYMYXIN B 400; 3.5; 5 [USP'U]/G; MG/G; [USP'U]/G
OINTMENT TOPICAL ONCE
Status: CANCELLED | OUTPATIENT
Start: 2021-10-28 | End: 2021-10-28

## 2021-10-28 RX ORDER — LIDOCAINE HYDROCHLORIDE 40 MG/ML
SOLUTION TOPICAL ONCE
Status: CANCELLED | OUTPATIENT
Start: 2021-10-28 | End: 2021-10-28

## 2021-10-28 RX ORDER — GENTAMICIN SULFATE 1 MG/G
OINTMENT TOPICAL ONCE
Status: CANCELLED | OUTPATIENT
Start: 2021-10-28 | End: 2021-10-28

## 2021-10-28 RX ORDER — LIDOCAINE 50 MG/G
OINTMENT TOPICAL ONCE
Status: CANCELLED | OUTPATIENT
Start: 2021-10-28 | End: 2021-10-28

## 2021-10-28 NOTE — PROGRESS NOTES
Wound Healing Center Followup Visit Note  Referring Physician : Renee Phillips MD  [address] [facility] RECORD NUMBER:  [id number]  AGE: 57 y.o. GENDER: male  : 1960  EPISODE DATE:  10/28/2021  Subjective:     Chief Complaint   Patient presents with    Wound Check     left heel       HISTORY of PRESENT ILLNESS HPI   Jasper Yu is a 57 y.o. male who presents today in regards to follow up evaluation and treatment of wound/ulcer. That patient's past medical, family and social hx were reviewed and changes were made if present. History of Wound Context:  Pt is known to ID s/p D/c from [facility] on  2021  Came in with 1. Left heel ulceration and necrosis of muscle. 2.  Peripheral arterial disease. 3. Acute osteomyelitis, left foot. 4. Cellulitis, left foot with abscess.     Current visit:  10/28/2021  Currently wound bed looks healthy    wound cx vse/corynebacterium  On cipro/doxy    7/15/2021  NAD NO ISSUES WITH ATBX CIPRO/DOXY   LEFT HEEL WOUND SAME NO SIGNS OF INFECTION     2021  Has no c/o  On doxy  Was supposed to be on cipro also   Using gent ointment   vacc off    6/3/2021  A wound culture SHOWED MRSA  MRI SHOWED ACUTE OM/MYOSITIS/CELLULITS  FINISHED  LINEZOLID 600MG PO Q12  ON DOXY   RECX   NO F/C/N/V/D/  50% WEIGHT BEARING    2021  IN WC NAD NO C/O  NO PAIN LE   MRI NOTED   ON DOXY     2021  Still at f on doxycycline he has no isues with it  Left heel has some pain  He is asking about more weight bearing     Sp vancomycin (VANCOCIN) 1,000 mg  Q12H can stop today/2021  Here for f/u left post heel ulcer   Has wound vacc   Currently off has bone palpable  periwound inatct   has left >right ble swelling   No calf pain   Has no c/o  Rue picc without swelling or pain  Tolerating doxycyline informed side effects of photosensitivity     Bilateral iliac angiogram, left femoral  angiography, nitroglycerin infusion left common femoral artery 1000 mcg  x2 boluses, right common femoral artery to left popliteal artery  catheterization, PTA of the left SFA and popliteal artery with 4 x 80  RapidCross balloon, 5 x 300 Saber balloon, 5 x 220 Powerflex balloon, 6  x 100 Powerflex balloon and 6 x 250 IN. PACT Admiral balloon, completion  left femoral angiography, ProGlide closure right common femoral artery  access site. 2/16 1. Excision and debridement of the left heel ulceration to and through  level of deep fascia and muscle. Total measurement is 4.5 cm x 6.0 cm x  0.5 cm in dimension. 2.  Incision and drainage, left foot abscess. 3.  Bone biopsy, left foot. 4.  Application of wound VAC negative pressure therapy. cx Enterococcus  Path Bone biopsy left heel: Medullary bone, negative for osteomyelitis.     Most Recent   Organism   Date Value Ref Range Status   09/20/2021 Enterococcus faecalis (A)  Final   09/20/2021 Corynebacterium species (A)  Corrected     PAST MEDICAL HISTORY      Diagnosis Date    Cerebral artery occlusion with cerebral infarction ([facility] [address]     Diabetes mellitus ([address]     Type 2    Hemiparesis affecting left side as late effect of cerebrovascular accident ([address]     LEFT SIDE NON DOMINANT FOLLOWING STROKE    Hypertension      Past Surgical History:   Procedure Laterality Date    FINGER AMPUTATION      FOOT DEBRIDEMENT Left 2/16/2021    LEFT FOOT DEBRIDEMENT WITH BONE BIOPSY, WOUND VAC APPLICATION performed by Tobi Rees DPM at [place] [address] ARTHROSCOPY      TONSILLECTOMY      TRANSESOPHAGEAL ECHOCARDIOGRAM N/A 2/22/2021    TRANSESOPHAGEAL ECHOCARDIOGRAM WITH BUBBLE STUDY performed by Milli Rodríguez MD at [address] [place] [address],[address] N/A 1/4/2021    EGD BIOPSY performed by Braxton Guadarrama DO at [facility] reviewed. No pertinent family history.   Social History     Tobacco Use    Smoking status: Former Smoker    Smokeless tobacco: Never Used   Vaping Use    Vaping Use: Never used Substance Use Topics    Alcohol use: Not Currently     Comment: Former every day drinker for most of adult life    Drug use: No     Allergies   Allergen Reactions    Pcn [Penicillins]      Current Outpatient Medications on File Prior to Encounter   Medication Sig Dispense Refill    diphenhydrAMINE (BENADRYL ALLERGY) 25 MG capsule Take 25 mg by mouth every 6 hours as needed for Itching      ciprofloxacin (CIPRO) 500 MG tablet Take 500 mg by mouth 2 times daily      gabapentin (NEURONTIN) 100 MG capsule Take 100 mg by mouth 3 times daily.  doxycycline hyclate (VIBRAMYCIN) 100 MG capsule Take 100 mg by mouth 2 times daily      B Complex-C-E-Zn (STRESS B/ZINC) TABS Take 1 tablet by mouth daily      acetaminophen (TYLENOL) 325 MG tablet Take 650 mg by mouth every 6 hours as needed for Pain      bisacodyl (DULCOLAX) 10 MG suppository Place 10 mg rectally daily      ferrous sulfate (IRON 325) 325 (65 Fe) MG tablet Take 325 mg by mouth daily (with breakfast)      magnesium hydroxide (MILK OF MAGNESIA CONCENTRATE) 2400 MG/10ML SUSP Take 30 mLs by mouth daily as needed      collagenase 250 UNIT/GM ointment Apply topically daily Apply topically daily.  ascorbic acid (VITAMIN C) 500 MG tablet Take 500 mg by mouth daily      Cholecalciferol (VITAMIN D3) 1.25 MG (76506 UT) CAPS Take by mouth      zinc gluconate 50 MG tablet Take 50 mg by mouth daily      glucose (GLUTOSE) 40 % GEL Take 37.5 mLs by mouth as needed (hypoglycemia) 45 g 1    glucagon, rDNA, 1 MG injection Inject 1 mg into the muscle as needed for Low blood sugar (Blood glucose less than 70 mg/dL and patient NOT ALERT or NPO and does not have IV access. ) 1 each 0    vitamin D (ERGOCALCIFEROL) 1.25 MG (46267 UT) CAPS capsule Take 1 capsule by mouth once a week 5 capsule 0    ipratropium-albuterol (DUONEB) 0.5-2.5 (3) MG/3ML SOLN nebulizer solution Inhale 3 mLs into the lungs every 4 hours 360 mL 0    pantoprazole (PROTONIX) 40 MG tablet Take 1 tablet by mouth every morning (before breakfast) 30 tablet 3    melatonin 3 MG TABS tablet Take 9 mg by mouth nightly as needed      aspirin 81 MG EC tablet Take 81 mg by mouth daily      hydroCHLOROthiazide (HYDRODIURIL) 25 MG tablet Take 25 mg by mouth daily      glimepiride (AMARYL) 2 MG tablet Take 1 tablet by mouth daily (with breakfast) 30 tablet 3    insulin lispro (HUMALOG) 100 UNIT/ML injection vial Inject 0-12 Units into the skin 3 times daily (with meals) 1 vial 3    insulin lispro (HUMALOG) 100 UNIT/ML injection vial Inject 0-6 Units into the skin nightly 1 vial 3    metFORMIN (GLUCOPHAGE) 500 MG tablet Take 1 tablet by mouth 2 times daily (with meals) 60 tablet 3    atorvastatin (LIPITOR) 40 MG tablet Take 1 tablet by mouth nightly 30 tablet 3    clopidogrel (PLAVIX) 75 MG tablet Take 1 tablet by mouth daily 30 tablet 3    vitamin B-1 100 MG tablet Take 1 tablet by mouth daily 30 tablet 3     No current facility-administered medications on file prior to encounter.      REVIEW OF SYSTEMS See HPI  Objective:    BP (!) 140/62   Pulse 73   Temp 97.4 °F (36.3 °C) (Temporal)   Resp 18   Ht 5' 11\" (1.803 m)   Wt 207 lb (93.9 kg)   BMI 28.87 kg/m²   Wt Readings from Last 3 Encounters:   10/28/21 207 lb (93.9 kg)   10/25/21 207 lb (93.9 kg)   10/18/21 207 lb (93.9 kg)     PHYSICAL EXAM  CONSTITUTIONAL:   Awake, alert,   HEENT: AT/NC glasses  HEART: S1/S2  RS: CTAB ant  ABD: SOFT NT/ND    EXT:  Trace EDEMA, left 2nd finger amp  SKIN: Open wound Present post heel wound bed  As below  Wound Care Documentation:  Wound 12/26/20 Arm Left (Active)   Number of days: 305       Wound 02/15/21 Heel Left (Active)   Number of days: 255       Wound 02/15/21 Heel Right (Active)   Number of days: 255       Wound 03/08/21 Heel Left #1 left heel aquired 12/1/20 (Active)   Wound Image   10/04/21 0313   Dressing Status New dressing applied 10/25/21 2939   Wound Cleansed Cleansed with saline 10/25/21 1362 Volume (cm^3) 0 cm^3 04/05/21 1011   Wound Assessment Fibrin 04/01/21 1357   Drainage Amount None 04/01/21 1357   Drainage Description Yellow 03/22/21 0911   Odor None 04/01/21 1357   Adrianne-wound Assessment Maceration 04/01/21 1357   Number of days: 234       Assessment:   S/p rx MSSA bacteremia   BILATERAL HEEL PRESSURE ULCERS   NECROTIC WOUND, OSTEOMYELITIS, Cellulitis left foot with palpable bone   S/p LEFT FOOT DEBRIDEMENT WITH BONE BIOPSY, WOUND VAC APPLICATION  Bone biopsy left heel: Medullary bone, negative for osteomyelitis. S/p angio    UTI  - proteus s/p rx with cefepime  H/o vitamin D deficiency on suppleemnts    2/14 wound cx Mixed Gram positive organisms   Proteus species   TTE Summary No vegetations seen otherwise. Plan:   Cont  Doxycyline/cipro    suppression  For now recx if needed can f/u 4-6 weeks  10/25/2021 wbc 8.6 cr0.9  CAN DO CBC/CMP/ESR/CRP EVERY 2 WEEKS     WOUND CARE GENT CREAM/ALGINATE ABD KERLEX  allergic to pcn   F/u 4 weeks     Orders Placed This Encounter   Procedures    Initiate Outpatient Wound Care Protocol     No orders of the defined types were placed in this encounter. Plan:   Treatment Note please see attached Discharge Instructions    Written patient dismissal instructions given to patient and signed by patient or POA.          Discharge Instructions       Discharge condition: Stable     Assessment of pain at discharge: moderate     Anesthetic used: 4% liquid lidocaine solution      Discharge to: ECF     Left via:ambulance     Accompanied by: self     ECF/H[facility]: [facility]     Dressing Orders:  Cleanse left heel ulcer with normal saline solution apply first gentamicin 0.1% ointment and cover with plain alginate and dry dressing and change every other day or as needed for drainage.  .      Treatment Orders:    Continue antibiotics per Dr Maxime Espana and Cipro for suppression.  Wear prevalon boots or heel ayo while in bed.          May be partial weight bearing up to 50% on left with surgical shoe.      for HBO evaluation - pt states leaving facility this week  - will send consult to HBO       Consider surgical debridement of infected bone in heel with skin graft.     [facility] followup visit: ______1 week, dr perales_________________ 1 weeks Dr Harrell____________________________  (Please note your next appointment above and if you are unable to keep, kindly give a 24 hour notice.  Thank you.)     Physician signature:__________________________        If you experience any of the following, please call the [facility] during business hours:     * Increase in Pain  * Temperature over 101  * Increase in drainage from your wound  * Drainage with a foul odor  * Bleeding  * Increase in swelling  * Need for compression bandage changes due to slippage, breakthrough drainage.     If you need medical attention outside of the business hours of the [facility] please contact your PCP or go to the nearest emergency room.                                                                    Electronically signed by Garett Day MD on 10/28/2021 at 3:24 PM

## 2021-10-28 NOTE — PLAN OF CARE
Problem: Wound:  Goal: Will show signs of wound healing; wound closure and no evidence of infection  Description: Will show signs of wound healing; wound closure and no evidence of infection  Outcome: Met This Shift     Problem: Blood Glucose:  Goal: Ability to maintain appropriate glucose levels will improve  Description: Ability to maintain appropriate glucose levels will improve  Outcome: Met This Shift     Problem: Pain:  Goal: Pain level will decrease  Description: Pain level will decrease  Outcome: Met This Shift  Goal: Control of chronic pain  Description: Control of chronic pain  Outcome: Met This Shift

## 2021-11-01 ENCOUNTER — HOSPITAL ENCOUNTER (OUTPATIENT)
Dept: WOUND CARE | Age: 61
Discharge: HOME | End: 2021-11-01
Payer: COMMERCIAL

## 2021-11-01 VITALS
TEMPERATURE: 97.4 F | SYSTOLIC BLOOD PRESSURE: 138 MMHG | HEART RATE: 86 BPM | WEIGHT: 207 LBS | RESPIRATION RATE: 18 BRPM | DIASTOLIC BLOOD PRESSURE: 76 MMHG | HEIGHT: 71 IN | BODY MASS INDEX: 28.98 KG/M2

## 2021-11-01 DIAGNOSIS — E11.628 DIABETIC FOOT INFECTION (HCC): Primary | ICD-10-CM

## 2021-11-01 DIAGNOSIS — L08.9 DIABETIC FOOT INFECTION (HCC): Primary | ICD-10-CM

## 2021-11-01 PROCEDURE — 11042 DBRDMT SUBQ TIS 1ST 20SQCM/<: CPT

## 2021-11-01 PROCEDURE — 6370000000 HC RX 637 (ALT 250 FOR IP): Performed by: PODIATRIST

## 2021-11-01 RX ORDER — LIDOCAINE HYDROCHLORIDE 40 MG/ML
SOLUTION TOPICAL ONCE
Status: COMPLETED | OUTPATIENT
Start: 2021-11-01 | End: 2021-11-01

## 2021-11-01 RX ORDER — LIDOCAINE HYDROCHLORIDE 20 MG/ML
JELLY TOPICAL ONCE
Status: CANCELLED | OUTPATIENT
Start: 2021-11-01 | End: 2021-11-01

## 2021-11-01 RX ORDER — BACITRACIN ZINC AND POLYMYXIN B SULFATE 500; 1000 [USP'U]/G; [USP'U]/G
OINTMENT TOPICAL ONCE
Status: CANCELLED | OUTPATIENT
Start: 2021-11-01 | End: 2021-11-01

## 2021-11-01 RX ORDER — LIDOCAINE 40 MG/G
CREAM TOPICAL ONCE
Status: CANCELLED | OUTPATIENT
Start: 2021-11-01 | End: 2021-11-01

## 2021-11-01 RX ORDER — GINSENG 100 MG
CAPSULE ORAL ONCE
Status: CANCELLED | OUTPATIENT
Start: 2021-11-01 | End: 2021-11-01

## 2021-11-01 RX ORDER — BETAMETHASONE DIPROPIONATE 0.05 %
OINTMENT (GRAM) TOPICAL ONCE
Status: CANCELLED | OUTPATIENT
Start: 2021-11-01 | End: 2021-11-01

## 2021-11-01 RX ORDER — BACITRACIN, NEOMYCIN, POLYMYXIN B 400; 3.5; 5 [USP'U]/G; MG/G; [USP'U]/G
OINTMENT TOPICAL ONCE
Status: CANCELLED | OUTPATIENT
Start: 2021-11-01 | End: 2021-11-01

## 2021-11-01 RX ORDER — LIDOCAINE 50 MG/G
OINTMENT TOPICAL ONCE
Status: CANCELLED | OUTPATIENT
Start: 2021-11-01 | End: 2021-11-01

## 2021-11-01 RX ORDER — GENTAMICIN SULFATE 1 MG/G
OINTMENT TOPICAL ONCE
Status: CANCELLED | OUTPATIENT
Start: 2021-11-01 | End: 2021-11-01

## 2021-11-01 RX ORDER — CLOBETASOL PROPIONATE 0.5 MG/G
OINTMENT TOPICAL ONCE
Status: CANCELLED | OUTPATIENT
Start: 2021-11-01 | End: 2021-11-01

## 2021-11-01 RX ORDER — LIDOCAINE HYDROCHLORIDE 40 MG/ML
SOLUTION TOPICAL ONCE
Status: CANCELLED | OUTPATIENT
Start: 2021-11-01 | End: 2021-11-01

## 2021-11-01 RX ADMIN — LIDOCAINE HYDROCHLORIDE 10 ML: 40 SOLUTION TOPICAL at 15:11

## 2021-11-01 ASSESSMENT — PAIN SCALES - GENERAL: PAINLEVEL_OUTOF10: 0

## 2021-11-01 NOTE — PROGRESS NOTES
Wound Healing Center  History and Physical/Consultation  Podiatry    Referring Physician : Ivan Smith MD  [address] [facility] RECORD NUMBER:  [id number]  AGE: 57 y.o. GENDER: male  : 1960  EPISODE DATE:  2021  Subjective:     Chief Complaint   Patient presents with    Wound Check     left foot         HISTORY of PRESENT ILLNESS HPI     Aaron Weston is a 57 y.o. male who presents today for wound/ulcer evaluation. History of Wound Context:  The patient has had a wound of bilateral heels which was first noted approximately over a month ago. This has been treated with surgical debridement. On their initial visit to the wound healing center, 21 ,  the patient has noted that the wound has not been improving. The patient has had similar previous wounds in the past.      Pt is not on abx at time of initial visit. 3/22/21 - Wound VAC MWF continue to left heel. Aquacel Ag to right heel. Debrided toenails 1-5 in thickness and length. FU 1 week. IV Abx per Dr. Pramod Irving. 21 - Wound VAC MWF to left heel. Cultures obtained. FU 1 week after visit with Dr. Pramod Irving. PO Abx.  21 - DC wound VAC to left heel. Alginate to wound. FU 1 week  21 - Alginate and gentamicin ointment QOD. Partial WB to heel at 50% to foot with surgical shoe and walker. 5/3/21 - Alginate and gentamicin ointment QOD. MRI left heel    5/10/21 - Alginate and gentamicin ointment QOD. MRI left heel pending. 21 - Alginate and gentamicin ointment QOD. MRI reviewed confirming OM. Discussed HBOT referral and partial calcanectomy. He opts for HBOT consultation. 21 - Alginate and add gentamicin ointment QD. HBOT referral.  Patient hold off on partial calcanectomy. FU 1 week    21 - Alginate and add gentamicin ointment QD. HBOT referral.  Patient hold off on partial calcanectomy. FU 1 week     21 - Alginate and add gentamicin ointment QD.   HBOT referral.  Patient hold off on partial calcanectomy. FU 1 week. Patient gave me consent (verbal) to speak with his brother Shruthi Lewis regarding his at home arrangement. HBOT is on hold until patient is at home. 7/12/21 -  Alginate and add gentamicin ointment QD. HBOT referral.  Patient considering partial calcanectomy with flap. FU 1 week. 7/19/21 - Alginate and add gentamicin ointment QD. HBOT referral.  Patient considering partial calcanectomy with flap. FU 1 week. 8/2/21 - Alginate and add gentamicin ointment QD. HBOT referral.  Patient considering partial calcanectomy with flap. FU 1 week. 8/16/21 - Alginate and add gentamicin ointment QD. HBOT referral.  Patient considering partial calcanectomy with flap. FU 1 week. 8/23/21 - Alginate and add gentamicin ointment QD. HBOT referral.  Patient considering partial calcanectomy with flap. FU 1 week. 8/30/21 - Alginate and add gentamicin ointment QD. HBOT referral.  Patient considering partial calcanectomy with flap. FU 1 week. 9/20/21 - Alginate and add gentamicin ointment QD. HBOT referral.  Patient considering partial calcanectomy with flap. FU 1 week. 10/25/21 Alginate and add gentamicin ointment QD. HBOT referral.  Patient considering partial calcanectomy with flap. FU 1 week. 11/1/21 Alginate and add gentamicin ointment QD. HBOT referral.  Patient considering partial calcanectomy with flap. FU 1 week.       Wound/Ulcer Pain Timing/Severity: none  Quality of pain: N/A  Severity:  0 / 10   Modifying Factors: None  Associated Signs/Symptoms: none    Ulcer Identification:  Ulcer Type: arterial and diabetic  Contributing Factors: diabetes    Diabetic/Pressure/Non Pressure Ulcers onl y:  Ulcer: Diabetic ulcer, fat layer exposed    If patient has diabetic lower extremity wounds  Kong Classification of diabetic lower extremity wounds:    Grade Description   []  0 No open wound   []  1 Superficial ulcer involving the full skin thickness [x]  2 Deep ulcer involves ligament, tendon, joint capsule, or fascia  No bone involvement or abscess presence   []  3 Deep Ulcer with abcess formation and/or osteomyelitis   []  4 Localized gangrene   []  5 Extensive gangrene of the foot     Wound: N/A        PAST MEDICAL HISTORY      Diagnosis Date    Cerebral artery occlusion with cerebral infarction ([facility] [address]     Diabetes mellitus ([facility] [address]     Type 2    Hemiparesis affecting left side as late effect of cerebrovascular accident ([facility] [address]     LEFT SIDE NON DOMINANT FOLLOWING STROKE    Hypertension      Past Surgical History:   Procedure Laterality Date    FINGER AMPUTATION      FOOT DEBRIDEMENT Left 2/16/2021    LEFT FOOT DEBRIDEMENT WITH BONE BIOPSY, WOUND VAC APPLICATION performed by Meagan Ingram DPM at [place] [address] ARTHROSCOPY      TONSILLECTOMY      TRANSESOPHAGEAL ECHOCARDIOGRAM N/A 2/22/2021    TRANSESOPHAGEAL ECHOCARDIOGRAM WITH BUBBLE STUDY performed by Didier Shields MD at [facility] N/A 1/4/2021    EGD BIOPSY performed by Radha Coffman DO at [address]. No pertinent family history. Social History     Tobacco Use    Smoking status: Former Smoker    Smokeless tobacco: Never Used   Vaping Use    Vaping Use: Never used   Substance Use Topics    Alcohol use: Not Currently     Comment: Former every day drinker for most of adult life    Drug use: No     Allergies   Allergen Reactions    Pcn [Penicillins]      Current Outpatient Medications on File Prior to Encounter   Medication Sig Dispense Refill    ciprofloxacin (CIPRO) 500 MG tablet Take 500 mg by mouth 2 times daily      gabapentin (NEURONTIN) 100 MG capsule Take 100 mg by mouth 3 times daily.        doxycycline hyclate (VIBRAMYCIN) 100 MG capsule Take 100 mg by mouth 2 times daily      B Complex-C-E-Zn (STRESS B/ZINC) TABS Take 1 tablet by mouth daily      bisacodyl (DULCOLAX) 10 MG suppository Place 10 mg rectally daily      ferrous sulfate (IRON 325) 325 (65 Fe) MG tablet Take 325 mg by mouth daily (with breakfast)      magnesium hydroxide (MILK OF MAGNESIA CONCENTRATE) 2400 MG/10ML SUSP Take 30 mLs by mouth daily as needed      collagenase 250 UNIT/GM ointment Apply topically daily Apply topically daily.       ascorbic acid (VITAMIN C) 500 MG tablet Take 500 mg by mouth daily      Cholecalciferol (VITAMIN D3) 1.25 MG (72455 UT) CAPS Take by mouth      zinc gluconate 50 MG tablet Take 50 mg by mouth daily      vitamin D (ERGOCALCIFEROL) 1.25 MG (68325 UT) CAPS capsule Take 1 capsule by mouth once a week 5 capsule 0    ipratropium-albuterol (DUONEB) 0.5-2.5 (3) MG/3ML SOLN nebulizer solution Inhale 3 mLs into the lungs every 4 hours 360 mL 0    pantoprazole (PROTONIX) 40 MG tablet Take 1 tablet by mouth every morning (before breakfast) 30 tablet 3    melatonin 3 MG TABS tablet Take 9 mg by mouth nightly as needed      aspirin 81 MG EC tablet Take 81 mg by mouth daily      hydroCHLOROthiazide (HYDRODIURIL) 25 MG tablet Take 25 mg by mouth daily      glimepiride (AMARYL) 2 MG tablet Take 1 tablet by mouth daily (with breakfast) 30 tablet 3    insulin lispro (HUMALOG) 100 UNIT/ML injection vial Inject 0-12 Units into the skin 3 times daily (with meals) 1 vial 3    insulin lispro (HUMALOG) 100 UNIT/ML injection vial Inject 0-6 Units into the skin nightly 1 vial 3    metFORMIN (GLUCOPHAGE) 500 MG tablet Take 1 tablet by mouth 2 times daily (with meals) 60 tablet 3    atorvastatin (LIPITOR) 40 MG tablet Take 1 tablet by mouth nightly 30 tablet 3    clopidogrel (PLAVIX) 75 MG tablet Take 1 tablet by mouth daily 30 tablet 3    vitamin B-1 100 MG tablet Take 1 tablet by mouth daily 30 tablet 3    diphenhydrAMINE (BENADRYL ALLERGY) 25 MG capsule Take 25 mg by mouth every 6 hours as needed for Itching      acetaminophen (TYLENOL) 325 MG tablet Take 650 mg by mouth every 6 hours as needed for Pain      glucose (GLUTOSE) 40 % GEL Take 37.5 mLs by mouth as needed (hypoglycemia) 45 g 1    glucagon, rDNA, 1 MG injection Inject 1 mg into the muscle as needed for Low blood sugar (Blood glucose less than 70 mg/dL and patient NOT ALERT or NPO and does not have IV access. ) 1 each 0     No current facility-administered medications on file prior to encounter.        REVIEW OF SYSTEMS   ROS : All others Negative if blank [], Positive if [x]  General Vascular   [] Fevers [] Claudication   [] Chills [] Rest Pain   Skin Neurologic   [x] Tissue Loss [x] Lower extremity neuropathy     Objective:    /76   Pulse 86   Temp 97.4 °F (36.3 °C) (Temporal)   Resp 18   Ht 5' 11\" (1.803 m)   Wt 207 lb (93.9 kg)   BMI 28.87 kg/m²   Wt Readings from Last 3 Encounters:   11/01/21 207 lb (93.9 kg)   10/28/21 207 lb (93.9 kg)   10/25/21 207 lb (93.9 kg)       PHYSICAL EXAM   CONSTITUTIONAL:   Awake, alert, cooperative   PSYCHIATRIC :  Oriented to time, place and person      normal insight to disease process  EXTREMITIES:   R LE Open wounds are noted   Skin color is normal   Edema is not noted   Sensation deficit noted - to foot   Palpation of the foot does cause pain   5/5 strength DF/PF  L LE Open wounds are noted   Skin color is abnormal with hyperpigmentation   Edema is not noted   Sensation deficit noted - bilateral feet   Palpation of the foot does cause pain   5/5 strength DF/PF  R dorsalis pedis +2 L dorsalis pedis +2   R posterior tibial +2 L posterior tibial +2     Assessment:     Problem List Items Addressed This Visit     Diabetic foot infection ([facility] [address] - Primary    Relevant Orders    Initiate Outpatient Wound Care Protocol          Pre Debridement Measurements:  Are located in the [facility]  Documentation Flow Sheet  Post Debridement Measurements:  Wound/Ulcer Descriptions are Pre Debridement except measurements:     Wound 12/26/20 Arm Left (Active)   Number of days: 309       Wound 02/15/21 Heel Left (Active)   Number of days: 04/05/21 0929   Wound Healing % 100 04/05/21 0929   Post-Procedure Length (cm) 0 cm 04/05/21 1011   Post-Procedure Width (cm) 0 cm 04/05/21 1011   Post-Procedure Depth (cm) 0 cm 04/05/21 1011   Post-Procedure Surface Area (cm^2) 0 cm^2 04/05/21 1011   Post-Procedure Volume (cm^3) 0 cm^3 04/05/21 1011   Wound Assessment Fibrin 04/01/21 1357   Drainage Amount None 04/01/21 1357   Drainage Description Yellow 03/22/21 0911   Odor None 04/01/21 1357   Adrianne-wound Assessment Maceration 04/01/21 1357   Number of days: 238     Incision 02/19/21 Groin Right (Active)   Number of days: 255       Procedure Note  Indications:  Based on my examination of this patient's wound(s)/ulcer(s) today, debridement is required to promote healing and evaluate the wound base. Performed by: Kristian Figueroa DPM    Consent obtained:  Yes    Time out taken:  Yes    Pain Control: Anesthetic  Anesthetic: 4% Lidocaine Liquid Topical     Debridement:Excisional Debridement    Using curette the wound(s)/ulcer(s) was/were sharply debrided down through and including the removal of subcutaneous tissue. Devitalized Tissue Debrided:  slough to stimulate bleeding to promote healing, post debridement good bleeding base and wound edges noted    Wound/Ulcer #: 2    Percent of Wound/Ulcer Debrided: 100%    Total Surface Area Debrided:  14.26 sq cm     Estimated Blood Loss:  None  Hemostasis Achieved:  by pressure    Procedural Pain:  2  / 10   Post Procedural Pain:  2 / 10     Response to treatment:  Well tolerated by patient. A culture was done.       Plan:     Pt is a smoker   - Discussed relationship of smoking and negative affects on wound healing   - Emphasized importance of tobacco avoidace/cessation   - Script for nicotine patch given to patient   - Information regarding support groups for smoking cessation given    In my professional opinion and based off the information that is available at this time this patient has appropriate indication for HBO Therapy: Maybe    Treatment Note please see attached Discharge Instructions    Written patient dismissal instructions given to patient and signed by patient or POA.            Electronically signed by Vanessa Noguera DPM on 11/1/2021 at 4:03 PM

## 2021-11-08 ENCOUNTER — HOSPITAL ENCOUNTER (OUTPATIENT)
Dept: WOUND CARE | Age: 61
Discharge: HOME | End: 2021-11-08
Payer: COMMERCIAL

## 2021-11-08 VITALS
HEART RATE: 92 BPM | HEIGHT: 71 IN | TEMPERATURE: 97.7 F | WEIGHT: 207 LBS | DIASTOLIC BLOOD PRESSURE: 70 MMHG | RESPIRATION RATE: 18 BRPM | BODY MASS INDEX: 28.98 KG/M2 | SYSTOLIC BLOOD PRESSURE: 142 MMHG

## 2021-11-08 DIAGNOSIS — E11.628 DIABETIC FOOT INFECTION (HCC): Primary | ICD-10-CM

## 2021-11-08 DIAGNOSIS — L08.9 DIABETIC FOOT INFECTION (HCC): Primary | ICD-10-CM

## 2021-11-08 PROCEDURE — 6370000000 HC RX 637 (ALT 250 FOR IP): Performed by: PODIATRIST

## 2021-11-08 PROCEDURE — 11042 DBRDMT SUBQ TIS 1ST 20SQCM/<: CPT

## 2021-11-08 RX ORDER — BETAMETHASONE DIPROPIONATE 0.05 %
OINTMENT (GRAM) TOPICAL ONCE
Status: CANCELLED | OUTPATIENT
Start: 2021-11-08 | End: 2021-11-08

## 2021-11-08 RX ORDER — GINSENG 100 MG
CAPSULE ORAL ONCE
Status: CANCELLED | OUTPATIENT
Start: 2021-11-08 | End: 2021-11-08

## 2021-11-08 RX ORDER — LIDOCAINE HYDROCHLORIDE 40 MG/ML
SOLUTION TOPICAL ONCE
Status: COMPLETED | OUTPATIENT
Start: 2021-11-08 | End: 2021-11-08

## 2021-11-08 RX ORDER — BACITRACIN, NEOMYCIN, POLYMYXIN B 400; 3.5; 5 [USP'U]/G; MG/G; [USP'U]/G
OINTMENT TOPICAL ONCE
Status: CANCELLED | OUTPATIENT
Start: 2021-11-08 | End: 2021-11-08

## 2021-11-08 RX ORDER — LIDOCAINE 50 MG/G
OINTMENT TOPICAL ONCE
Status: CANCELLED | OUTPATIENT
Start: 2021-11-08 | End: 2021-11-08

## 2021-11-08 RX ORDER — LIDOCAINE HYDROCHLORIDE 20 MG/ML
JELLY TOPICAL ONCE
Status: CANCELLED | OUTPATIENT
Start: 2021-11-08 | End: 2021-11-08

## 2021-11-08 RX ORDER — LIDOCAINE HYDROCHLORIDE 40 MG/ML
SOLUTION TOPICAL ONCE
Status: CANCELLED | OUTPATIENT
Start: 2021-11-08 | End: 2021-11-08

## 2021-11-08 RX ORDER — GENTAMICIN SULFATE 1 MG/G
OINTMENT TOPICAL ONCE
Status: CANCELLED | OUTPATIENT
Start: 2021-11-08 | End: 2021-11-08

## 2021-11-08 RX ORDER — LIDOCAINE 40 MG/G
CREAM TOPICAL ONCE
Status: CANCELLED | OUTPATIENT
Start: 2021-11-08 | End: 2021-11-08

## 2021-11-08 RX ORDER — CLOBETASOL PROPIONATE 0.5 MG/G
OINTMENT TOPICAL ONCE
Status: CANCELLED | OUTPATIENT
Start: 2021-11-08 | End: 2021-11-08

## 2021-11-08 RX ORDER — BACITRACIN ZINC AND POLYMYXIN B SULFATE 500; 1000 [USP'U]/G; [USP'U]/G
OINTMENT TOPICAL ONCE
Status: CANCELLED | OUTPATIENT
Start: 2021-11-08 | End: 2021-11-08

## 2021-11-08 RX ADMIN — LIDOCAINE HYDROCHLORIDE: 40 SOLUTION TOPICAL at 16:03

## 2021-11-08 ASSESSMENT — PAIN - FUNCTIONAL ASSESSMENT: PAIN_FUNCTIONAL_ASSESSMENT: ACTIVITIES ARE NOT PREVENTED

## 2021-11-08 ASSESSMENT — PAIN SCALES - GENERAL: PAINLEVEL_OUTOF10: 2

## 2021-11-08 ASSESSMENT — PAIN DESCRIPTION - PROGRESSION: CLINICAL_PROGRESSION: GRADUALLY IMPROVING

## 2021-11-08 ASSESSMENT — PAIN DESCRIPTION - ONSET: ONSET: GRADUAL

## 2021-11-08 ASSESSMENT — PAIN DESCRIPTION - PAIN TYPE: TYPE: CHRONIC PAIN

## 2021-11-08 ASSESSMENT — PAIN DESCRIPTION - LOCATION: LOCATION: FOOT

## 2021-11-08 ASSESSMENT — PAIN DESCRIPTION - FREQUENCY: FREQUENCY: INTERMITTENT

## 2021-11-08 ASSESSMENT — PAIN DESCRIPTION - ORIENTATION: ORIENTATION: LEFT

## 2021-11-08 ASSESSMENT — PAIN DESCRIPTION - DESCRIPTORS: DESCRIPTORS: CONSTANT;ACHING

## 2021-11-08 NOTE — PROGRESS NOTES
Wound Healing Center  History and Physical/Consultation  Podiatry    Referring Physician : Deepak Kenney MD  [address] [facility] RECORD NUMBER:  [id number]  AGE: 57 y.o. GENDER: male  : 1960  EPISODE DATE:  2021  Subjective:     Chief Complaint   Patient presents with    Wound Check     left heel         HISTORY of PRESENT ILLNESS HPI     Mikie Jernigan is a 57 y.o. male who presents today for wound/ulcer evaluation. History of Wound Context:  The patient has had a wound of bilateral heels which was first noted approximately over a month ago. This has been treated with surgical debridement. On their initial visit to the wound healing center, 21 ,  the patient has noted that the wound has not been improving. The patient has had similar previous wounds in the past.      Pt is not on abx at time of initial visit. 3/22/21 - Wound VAC MWF continue to left heel. Aquacel Ag to right heel. Debrided toenails 1-5 in thickness and length. FU 1 week. IV Abx per Dr. Tigist Huggins. 21 - Wound VAC MWF to left heel. Cultures obtained. FU 1 week after visit with Dr. Tigist Huggins. PO Abx.  21 - DC wound VAC to left heel. Alginate to wound. FU 1 week  21 - Alginate and gentamicin ointment QOD. Partial WB to heel at 50% to foot with surgical shoe and walker. 5/3/21 - Alginate and gentamicin ointment QOD. MRI left heel    5/10/21 - Alginate and gentamicin ointment QOD. MRI left heel pending. 21 - Alginate and gentamicin ointment QOD. MRI reviewed confirming OM. Discussed HBOT referral and partial calcanectomy. He opts for HBOT consultation. 21 - Alginate and add gentamicin ointment QD. HBOT referral.  Patient hold off on partial calcanectomy. FU 1 week    21 - Alginate and add gentamicin ointment QD. HBOT referral.  Patient hold off on partial calcanectomy. FU 1 week     21 - Alginate and add gentamicin ointment QD.   HBOT referral.  Patient hold off on partial calcanectomy. FU 1 week. Patient gave me consent (verbal) to speak with his brother Shana Mark regarding his at home arrangement. HBOT is on hold until patient is at home. 7/12/21 -  Alginate and add gentamicin ointment QD. HBOT referral.  Patient considering partial calcanectomy with flap. FU 1 week. 7/19/21 - Alginate and add gentamicin ointment QD. HBOT referral.  Patient considering partial calcanectomy with flap. FU 1 week. 8/2/21 - Alginate and add gentamicin ointment QD. HBOT referral.  Patient considering partial calcanectomy with flap. FU 1 week. 8/16/21 - Alginate and add gentamicin ointment QD. HBOT referral.  Patient considering partial calcanectomy with flap. FU 1 week. 8/23/21 - Alginate and add gentamicin ointment QD. HBOT referral.  Patient considering partial calcanectomy with flap. FU 1 week. 8/30/21 - Alginate and add gentamicin ointment QD. HBOT referral.  Patient considering partial calcanectomy with flap. FU 1 week. 9/20/21 - Alginate and add gentamicin ointment QD. HBOT referral.  Patient considering partial calcanectomy with flap. FU 1 week. 10/25/21 Alginate and add gentamicin ointment QD. HBOT referral.  Patient considering partial calcanectomy with flap. FU 1 week. 11/1/21 Alginate and add gentamicin ointment QD. HBOT referral.  Patient considering partial calcanectomy with flap. FU 1 week. 11/8/21 - Alginate and add gentamicin ointment QD. HBOT referral.  Patient considering partial calcanectomy with flap. FU 1 week.       Wound/Ulcer Pain Timing/Severity: none  Quality of pain: N/A  Severity:  0 / 10   Modifying Factors: None  Associated Signs/Symptoms: none    Ulcer Identification:  Ulcer Type: arterial and diabetic  Contributing Factors: diabetes    Diabetic/Pressure/Non Pressure Ulcers onl y:  Ulcer: Diabetic ulcer, fat layer exposed    If patient has diabetic lower extremity wounds  Kong Classification of diabetic lower extremity wounds:    Grade Description   []  0 No open wound   []  1 Superficial ulcer involving the full skin thickness   [x]  2 Deep ulcer involves ligament, tendon, joint capsule, or fascia  No bone involvement or abscess presence   []  3 Deep Ulcer with abcess formation and/or osteomyelitis   []  4 Localized gangrene   []  5 Extensive gangrene of the foot     Wound: N/A        PAST MEDICAL HISTORY      Diagnosis Date    Cerebral artery occlusion with cerebral infarction ([facility] [address]     Diabetes mellitus ([facility] [address]     Type 2    Hemiparesis affecting left side as late effect of cerebrovascular accident ([facility] [address]     LEFT SIDE NON DOMINANT FOLLOWING STROKE    Hypertension      Past Surgical History:   Procedure Laterality Date    FINGER AMPUTATION      FOOT DEBRIDEMENT Left 2/16/2021    LEFT FOOT DEBRIDEMENT WITH BONE BIOPSY, WOUND VAC APPLICATION performed by Brett Pepe DPM at [place] [address] ARTHROSCOPY      TONSILLECTOMY      TRANSESOPHAGEAL ECHOCARDIOGRAM N/A 2/22/2021    TRANSESOPHAGEAL ECHOCARDIOGRAM WITH BUBBLE STUDY performed by Brice Phillip MD at [facility] N/A 1/4/2021    EGD BIOPSY performed by Darlene Morrison DO at [place] [address] reviewed. No pertinent family history. Social History     Tobacco Use    Smoking status: Former Smoker    Smokeless tobacco: Never Used   Vaping Use    Vaping Use: Never used   Substance Use Topics    Alcohol use: Not Currently     Comment: Former every day drinker for most of adult life    Drug use: No     Allergies   Allergen Reactions    Pcn [Penicillins]      Current Outpatient Medications on File Prior to Encounter   Medication Sig Dispense Refill    ciprofloxacin (CIPRO) 500 MG tablet Take 500 mg by mouth 2 times daily      gabapentin (NEURONTIN) 100 MG capsule Take 100 mg by mouth 3 times daily.        doxycycline hyclate (VIBRAMYCIN) 100 MG capsule Take 100 mg by mouth 2 times daily      B Complex-C-E-Zn (STRESS B/ZINC) TABS Take 1 tablet by mouth daily      acetaminophen (TYLENOL) 325 MG tablet Take 650 mg by mouth every 6 hours as needed for Pain      bisacodyl (DULCOLAX) 10 MG suppository Place 10 mg rectally daily      ferrous sulfate (IRON 325) 325 (65 Fe) MG tablet Take 325 mg by mouth daily (with breakfast)      collagenase 250 UNIT/GM ointment Apply topically daily Apply topically daily.       ascorbic acid (VITAMIN C) 500 MG tablet Take 500 mg by mouth daily      Cholecalciferol (VITAMIN D3) 1.25 MG (55095 UT) CAPS Take by mouth      zinc gluconate 50 MG tablet Take 50 mg by mouth daily      vitamin D (ERGOCALCIFEROL) 1.25 MG (94301 UT) CAPS capsule Take 1 capsule by mouth once a week 5 capsule 0    ipratropium-albuterol (DUONEB) 0.5-2.5 (3) MG/3ML SOLN nebulizer solution Inhale 3 mLs into the lungs every 4 hours 360 mL 0    pantoprazole (PROTONIX) 40 MG tablet Take 1 tablet by mouth every morning (before breakfast) 30 tablet 3    aspirin 81 MG EC tablet Take 81 mg by mouth daily      hydroCHLOROthiazide (HYDRODIURIL) 25 MG tablet Take 25 mg by mouth daily      glimepiride (AMARYL) 2 MG tablet Take 1 tablet by mouth daily (with breakfast) 30 tablet 3    insulin lispro (HUMALOG) 100 UNIT/ML injection vial Inject 0-12 Units into the skin 3 times daily (with meals) 1 vial 3    insulin lispro (HUMALOG) 100 UNIT/ML injection vial Inject 0-6 Units into the skin nightly 1 vial 3    metFORMIN (GLUCOPHAGE) 500 MG tablet Take 1 tablet by mouth 2 times daily (with meals) 60 tablet 3    atorvastatin (LIPITOR) 40 MG tablet Take 1 tablet by mouth nightly 30 tablet 3    clopidogrel (PLAVIX) 75 MG tablet Take 1 tablet by mouth daily 30 tablet 3    vitamin B-1 100 MG tablet Take 1 tablet by mouth daily 30 tablet 3    diphenhydrAMINE (BENADRYL ALLERGY) 25 MG capsule Take 25 mg by mouth every 6 hours as needed for Itching      magnesium hydroxide (MILK OF MAGNESIA CONCENTRATE) 2400 MG/10ML Wound Depth (cm) 0 cm 04/05/21 0929   Wound Surface Area (cm^2) 0 cm^2 04/05/21 0929   Change in Wound Size % (l*w) 100 04/05/21 0929   Wound Volume (cm^3) 0 cm^3 04/05/21 0929   Wound Healing % 100 04/05/21 0929   Post-Procedure Length (cm) 0 cm 04/05/21 1011   Post-Procedure Width (cm) 0 cm 04/05/21 1011   Post-Procedure Depth (cm) 0 cm 04/05/21 1011   Post-Procedure Surface Area (cm^2) 0 cm^2 04/05/21 1011   Post-Procedure Volume (cm^3) 0 cm^3 04/05/21 1011   Wound Assessment Fibrin 04/01/21 1357   Drainage Amount None 04/01/21 1357   Drainage Description Yellow 03/22/21 0911   Odor None 04/01/21 1357   Adrianne-wound Assessment Maceration 04/01/21 1357   Number of days: 245     Incision 02/19/21 Groin Right (Active)   Number of days: 262       Procedure Note  Indications:  Based on my examination of this patient's wound(s)/ulcer(s) today, debridement is required to promote healing and evaluate the wound base. Performed by: Brett Pepe DPM    Consent obtained:  Yes    Time out taken:  Yes    Pain Control: Anesthetic  Anesthetic: 4% Lidocaine Liquid Topical     Debridement:Excisional Debridement    Using curette the wound(s)/ulcer(s) was/were sharply debrided down through and including the removal of subcutaneous tissue. Devitalized Tissue Debrided:  slough to stimulate bleeding to promote healing, post debridement good bleeding base and wound edges noted    Wound/Ulcer #: 2    Percent of Wound/Ulcer Debrided: 100%    Total Surface Area Debrided:  15.84 sq cm     Estimated Blood Loss:  None  Hemostasis Achieved:  by pressure    Procedural Pain:  2  / 10   Post Procedural Pain:  2 / 10     Response to treatment:  Well tolerated by patient. A culture was done.       Plan:     Pt is a smoker   - Discussed relationship of smoking and negative affects on wound healing   - Emphasized importance of tobacco avoidace/cessation   - Script for nicotine patch given to patient   - Information regarding support groups for smoking cessation given    In my professional opinion and based off the information that is available at this time this patient has appropriate indication for HBO Therapy: Maybe    Treatment Note please see attached Discharge Instructions    Written patient dismissal instructions given to patient and signed by patient or POA.            Electronically signed by Tanya Higgins DPM on 11/8/2021 at 4:16 PM

## 2021-11-08 NOTE — PLAN OF CARE
Problem: Pain:  Goal: Pain level will decrease  Description: Pain level will decrease  Outcome: Met This Shift  Goal: Control of acute pain  Description: Control of acute pain  Outcome: Met This Shift  Goal: Control of chronic pain  Description: Control of chronic pain  Outcome: Met This Shift     Problem: Pain:  Goal: Pain level will decrease  Description: Pain level will decrease  Outcome: Met This Shift  Goal: Control of acute pain  Description: Control of acute pain  Outcome: Met This Shift  Goal: Control of chronic pain  Description: Control of chronic pain  Outcome: Met This Shift     Problem: Wound:  Goal: Will show signs of wound healing; wound closure and no evidence of infection  Description: Will show signs of wound healing; wound closure and no evidence of infection  Outcome: Met This Shift

## 2021-11-15 ENCOUNTER — HOSPITAL ENCOUNTER (OUTPATIENT)
Dept: WOUND CARE | Age: 61
Discharge: HOME | End: 2021-11-15
Payer: COMMERCIAL

## 2021-11-15 VITALS
TEMPERATURE: 97.7 F | DIASTOLIC BLOOD PRESSURE: 68 MMHG | RESPIRATION RATE: 16 BRPM | HEART RATE: 104 BPM | SYSTOLIC BLOOD PRESSURE: 122 MMHG | BODY MASS INDEX: 28.98 KG/M2 | WEIGHT: 207 LBS | HEIGHT: 71 IN

## 2021-11-15 DIAGNOSIS — L08.9 DIABETIC FOOT INFECTION (HCC): Primary | ICD-10-CM

## 2021-11-15 DIAGNOSIS — E11.628 DIABETIC FOOT INFECTION (HCC): Primary | ICD-10-CM

## 2021-11-15 PROCEDURE — 6370000000 HC RX 637 (ALT 250 FOR IP): Performed by: PODIATRIST

## 2021-11-15 PROCEDURE — 11042 DBRDMT SUBQ TIS 1ST 20SQCM/<: CPT

## 2021-11-15 RX ORDER — LIDOCAINE 50 MG/G
OINTMENT TOPICAL ONCE
Status: CANCELLED | OUTPATIENT
Start: 2021-11-15 | End: 2021-11-15

## 2021-11-15 RX ORDER — GENTAMICIN SULFATE 1 MG/G
OINTMENT TOPICAL ONCE
Status: CANCELLED | OUTPATIENT
Start: 2021-11-15 | End: 2021-11-15

## 2021-11-15 RX ORDER — LIDOCAINE HYDROCHLORIDE 20 MG/ML
JELLY TOPICAL ONCE
Status: CANCELLED | OUTPATIENT
Start: 2021-11-15 | End: 2021-11-15

## 2021-11-15 RX ORDER — LIDOCAINE HYDROCHLORIDE 40 MG/ML
SOLUTION TOPICAL ONCE
Status: COMPLETED | OUTPATIENT
Start: 2021-11-15 | End: 2021-11-15

## 2021-11-15 RX ORDER — LIDOCAINE 40 MG/G
CREAM TOPICAL ONCE
Status: CANCELLED | OUTPATIENT
Start: 2021-11-15 | End: 2021-11-15

## 2021-11-15 RX ORDER — CLOBETASOL PROPIONATE 0.5 MG/G
OINTMENT TOPICAL ONCE
Status: CANCELLED | OUTPATIENT
Start: 2021-11-15 | End: 2021-11-15

## 2021-11-15 RX ORDER — BACITRACIN ZINC AND POLYMYXIN B SULFATE 500; 1000 [USP'U]/G; [USP'U]/G
OINTMENT TOPICAL ONCE
Status: CANCELLED | OUTPATIENT
Start: 2021-11-15 | End: 2021-11-15

## 2021-11-15 RX ORDER — GINSENG 100 MG
CAPSULE ORAL ONCE
Status: CANCELLED | OUTPATIENT
Start: 2021-11-15 | End: 2021-11-15

## 2021-11-15 RX ORDER — LIDOCAINE HYDROCHLORIDE 40 MG/ML
SOLUTION TOPICAL ONCE
Status: CANCELLED | OUTPATIENT
Start: 2021-11-15 | End: 2021-11-15

## 2021-11-15 RX ORDER — BETAMETHASONE DIPROPIONATE 0.05 %
OINTMENT (GRAM) TOPICAL ONCE
Status: CANCELLED | OUTPATIENT
Start: 2021-11-15 | End: 2021-11-15

## 2021-11-15 RX ORDER — BACITRACIN, NEOMYCIN, POLYMYXIN B 400; 3.5; 5 [USP'U]/G; MG/G; [USP'U]/G
OINTMENT TOPICAL ONCE
Status: CANCELLED | OUTPATIENT
Start: 2021-11-15 | End: 2021-11-15

## 2021-11-15 RX ADMIN — LIDOCAINE HYDROCHLORIDE: 40 SOLUTION TOPICAL at 14:43

## 2021-11-15 ASSESSMENT — PAIN DESCRIPTION - PROGRESSION: CLINICAL_PROGRESSION: GRADUALLY IMPROVING

## 2021-11-15 ASSESSMENT — PAIN DESCRIPTION - DESCRIPTORS: DESCRIPTORS: ACHING;DULL

## 2021-11-15 ASSESSMENT — PAIN DESCRIPTION - PAIN TYPE: TYPE: CHRONIC PAIN

## 2021-11-15 ASSESSMENT — PAIN DESCRIPTION - ONSET: ONSET: GRADUAL

## 2021-11-15 ASSESSMENT — PAIN DESCRIPTION - ORIENTATION: ORIENTATION: LEFT

## 2021-11-15 ASSESSMENT — PAIN DESCRIPTION - LOCATION: LOCATION: FOOT

## 2021-11-15 ASSESSMENT — PAIN SCALES - GENERAL: PAINLEVEL_OUTOF10: 2

## 2021-11-15 ASSESSMENT — PAIN DESCRIPTION - FREQUENCY: FREQUENCY: INTERMITTENT

## 2021-11-15 ASSESSMENT — PAIN - FUNCTIONAL ASSESSMENT: PAIN_FUNCTIONAL_ASSESSMENT: ACTIVITIES ARE NOT PREVENTED

## 2021-11-15 NOTE — PROGRESS NOTES
Patient hold off on partial calcanectomy. FU 1 week. Patient gave me consent (verbal) to speak with his brother Cherie Villalobos regarding his at home arrangement. HBOT is on hold until patient is at home. 7/12/21 -  Alginate and add gentamicin ointment QD. HBOT referral.  Patient considering partial calcanectomy with flap. FU 1 week. 7/19/21 - Alginate and add gentamicin ointment QD. HBOT referral.  Patient considering partial calcanectomy with flap. FU 1 week. 8/2/21 - Alginate and add gentamicin ointment QD. HBOT referral.  Patient considering partial calcanectomy with flap. FU 1 week. 8/16/21 - Alginate and add gentamicin ointment QD. HBOT referral.  Patient considering partial calcanectomy with flap. FU 1 week. 8/23/21 - Alginate and add gentamicin ointment QD. HBOT referral.  Patient considering partial calcanectomy with flap. FU 1 week. 8/30/21 - Alginate and add gentamicin ointment QD. HBOT referral.  Patient considering partial calcanectomy with flap. FU 1 week. 9/20/21 - Alginate and add gentamicin ointment QD. HBOT referral.  Patient considering partial calcanectomy with flap. FU 1 week. 10/25/21 Alginate and add gentamicin ointment QD. HBOT referral.  Patient considering partial calcanectomy with flap. FU 1 week. 11/1/21 Alginate and add gentamicin ointment QD. HBOT referral.  Patient considering partial calcanectomy with flap. FU 1 week. 11/8/21 - Alginate and add gentamicin ointment QD. HBOT referral.  Patient considering partial calcanectomy with flap. FU 1 week. 11/15/21 - Alginate and add gentamicin ointment QD. HBOT referral.  Patient considering partial calcanectomy with flap. FU 1 week.       Wound/Ulcer Pain Timing/Severity: none  Quality of pain: N/A  Severity:  0 / 10   Modifying Factors: None  Associated Signs/Symptoms: none    Ulcer Identification:  Ulcer Type: arterial and diabetic  Contributing Factors: diabetes    Diabetic/Pressure/Non Pressure Ulcers onl y:  Ulcer: Diabetic ulcer, fat layer exposed    If patient has diabetic lower extremity wounds  [place] Classification of diabetic lower extremity wounds:    Grade Description   []  0 No open wound   []  1 Superficial ulcer involving the full skin thickness   [x]  2 Deep ulcer involves ligament, tendon, joint capsule, or fascia  No bone involvement or abscess presence   []  3 Deep Ulcer with abcess formation and/or osteomyelitis   []  4 Localized gangrene   []  5 Extensive gangrene of the foot     Wound: N/A        PAST MEDICAL HISTORY      Diagnosis Date    Cerebral artery occlusion with cerebral infarction [facility]     Diabetes mellitus ([facility]     Type 2    Hemiparesis affecting left side as late effect of cerebrovascular accident ([facility] [facility]     LEFT SIDE NON DOMINANT FOLLOWING STROKE    Hypertension      Past Surgical History:   Procedure Laterality Date    FINGER AMPUTATION      FOOT DEBRIDEMENT Left 2/16/2021    LEFT FOOT DEBRIDEMENT WITH BONE BIOPSY, WOUND VAC APPLICATION performed by Brett Pepe DPM at [place] [address] ARTHROSCOPY      TONSILLECTOMY      TRANSESOPHAGEAL ECHOCARDIOGRAM N/A 2/22/2021    TRANSESOPHAGEAL ECHOCARDIOGRAM WITH BUBBLE STUDY performed by Brice Phillip MD at [address] [place] [address] N/A 1/4/2021    EGD BIOPSY performed by Darlene Morrison DO at [place] [address]     No family history on file.   Social History     Tobacco Use    Smoking status: Former Smoker    Smokeless tobacco: Never Used   Vaping Use    Vaping Use: Never used   Substance Use Topics    Alcohol use: Not Currently     Comment: Former every day drinker for most of adult life    Drug use: No     Allergies   Allergen Reactions    Pcn [Penicillins]      Current Outpatient Medications on File Prior to Encounter   Medication Sig Dispense Refill    ciprofloxacin (CIPRO) 500 MG tablet Take 500 mg by mouth 2 times daily      gabapentin (NEURONTIN) 100 MG capsule Take 100 mg by mouth 3 times daily.  doxycycline hyclate (VIBRAMYCIN) 100 MG capsule Take 100 mg by mouth 2 times daily      B Complex-C-E-Zn (STRESS B/ZINC) TABS Take 1 tablet by mouth daily      bisacodyl (DULCOLAX) 10 MG suppository Place 10 mg rectally daily      ferrous sulfate (IRON 325) 325 (65 Fe) MG tablet Take 325 mg by mouth daily (with breakfast)      collagenase 250 UNIT/GM ointment Apply topically daily Apply topically daily.       ascorbic acid (VITAMIN C) 500 MG tablet Take 500 mg by mouth daily      Cholecalciferol (VITAMIN D3) 1.25 MG (36862 UT) CAPS Take by mouth      zinc gluconate 50 MG tablet Take 50 mg by mouth daily      vitamin D (ERGOCALCIFEROL) 1.25 MG (78145 UT) CAPS capsule Take 1 capsule by mouth once a week 5 capsule 0    ipratropium-albuterol (DUONEB) 0.5-2.5 (3) MG/3ML SOLN nebulizer solution Inhale 3 mLs into the lungs every 4 hours 360 mL 0    pantoprazole (PROTONIX) 40 MG tablet Take 1 tablet by mouth every morning (before breakfast) 30 tablet 3    aspirin 81 MG EC tablet Take 81 mg by mouth daily      hydroCHLOROthiazide (HYDRODIURIL) 25 MG tablet Take 25 mg by mouth daily      glimepiride (AMARYL) 2 MG tablet Take 1 tablet by mouth daily (with breakfast) 30 tablet 3    insulin lispro (HUMALOG) 100 UNIT/ML injection vial Inject 0-12 Units into the skin 3 times daily (with meals) 1 vial 3    insulin lispro (HUMALOG) 100 UNIT/ML injection vial Inject 0-6 Units into the skin nightly 1 vial 3    metFORMIN (GLUCOPHAGE) 500 MG tablet Take 1 tablet by mouth 2 times daily (with meals) 60 tablet 3    atorvastatin (LIPITOR) 40 MG tablet Take 1 tablet by mouth nightly 30 tablet 3    clopidogrel (PLAVIX) 75 MG tablet Take 1 tablet by mouth daily 30 tablet 3    vitamin B-1 100 MG tablet Take 1 tablet by mouth daily 30 tablet 3    diphenhydrAMINE (BENADRYL ALLERGY) 25 MG capsule Take 25 mg by mouth every 6 hours as needed for Itching      acetaminophen (TYLENOL) 325 MG tablet Take 650 mg by mouth every 6 hours as needed for Pain      magnesium hydroxide (MILK OF MAGNESIA CONCENTRATE) 2400 MG/10ML SUSP Take 30 mLs by mouth daily as needed      glucose (GLUTOSE) 40 % GEL Take 37.5 mLs by mouth as needed (hypoglycemia) 45 g 1    glucagon, rDNA, 1 MG injection Inject 1 mg into the muscle as needed for Low blood sugar (Blood glucose less than 70 mg/dL and patient NOT ALERT or NPO and does not have IV access. ) 1 each 0    melatonin 3 MG TABS tablet Take 9 mg by mouth nightly as needed       No current facility-administered medications on file prior to encounter.        REVIEW OF SYSTEMS   ROS : All others Negative if blank [], Positive if [x]  General Vascular   [] Fevers [] Claudication   [] Chills [] Rest Pain   Skin Neurologic   [x] Tissue Loss [x] Lower extremity neuropathy     Objective:    /68   Pulse 104   Temp 97.7 °F (36.5 °C) (Temporal)   Resp 16   Ht 5' 11\" (1.803 m)   Wt 207 lb (93.9 kg)   BMI 28.87 kg/m²   Wt Readings from Last 3 Encounters:   11/15/21 207 lb (93.9 kg)   11/08/21 207 lb (93.9 kg)   11/01/21 207 lb (93.9 kg)       PHYSICAL EXAM   CONSTITUTIONAL:   Awake, alert, cooperative   PSYCHIATRIC :  Oriented to time, place and person      normal insight to disease process  EXTREMITIES:   R LE Open wounds are noted   Skin color is normal   Edema is not noted   Sensation deficit noted - to foot   Palpation of the foot does cause pain   5/5 strength DF/PF  L LE Open wounds are noted   Skin color is abnormal with hyperpigmentation   Edema is not noted   Sensation deficit noted - bilateral feet   Palpation of the foot does cause pain   5/5 strength DF/PF  R dorsalis pedis +2 L dorsalis pedis +2   R posterior tibial +2 L posterior tibial +2     Assessment:     Problem List Items Addressed This Visit     Diabetic foot infection ([address] - Primary    Relevant Orders    Initiate Outpatient Wound Care Protocol          Pre Debridement Measurements:  Are located in the [place]  Documentation Flow Sheet  Post Debridement Measurements:  Wound/Ulcer Descriptions are Pre Debridement except measurements:     Wound 12/26/20 Arm Left (Active)   Number of days: 323       Wound 02/15/21 Heel Left (Active)   Number of days: 273       Wound 02/15/21 Heel Right (Active)   Number of days: 273       Wound 03/08/21 Heel Left #1 left heel aquired 12/1/20 (Active)   Wound Image   10/04/21 1456   Dressing Status New dressing applied 11/15/21 1535   Wound Cleansed Cleansed with saline 11/15/21 1535   Dressing/Treatment Alginate; ABD; Dry dressing; Roll gauze 11/15/21 1535   Offloading for Diabetic Foot Ulcers Post op shoe 11/15/21 1535   Wound Length (cm) 4 cm 11/15/21 1442   Wound Width (cm) 2.8 cm 11/15/21 1442   Wound Depth (cm) 0.5 cm 11/15/21 1442   Wound Surface Area (cm^2) 11.2 cm^2 11/15/21 1442   Change in Wound Size % (l*w) 7.36 11/15/21 1442   Wound Volume (cm^3) 5.6 cm^3 11/15/21 1442   Wound Healing % 34 11/15/21 1442   Post-Procedure Length (cm) 4.1 cm 11/15/21 1519   Post-Procedure Width (cm) 2.9 cm 11/15/21 1519   Post-Procedure Depth (cm) 0.6 cm 11/15/21 1519   Post-Procedure Surface Area (cm^2) 11.89 cm^2 11/15/21 1519   Post-Procedure Volume (cm^3) 7.134 cm^3 11/15/21 1519   Undermining Starts ___ O'Clock 6 07/19/21 1054   Undermining Ends___ O'Clock 3 07/19/21 1054   Undermining Maxium Distance (cm) 0 10/04/21 1456   Wound Assessment [place]/red; [facility] 11/15/21 1442   Drainage Amount Moderate 11/15/21 1442   Drainage Description Yellow 11/15/21 1442   Odor None 11/15/21 1442   Adrianne-wound Assessment Maceration 11/15/21 1442   Number of days: 252       Wound 03/08/21 Heel Right #2 rt heel aquired 12/1/20 (Active)   Wound Image   04/05/21 0929   Dressing Status New dressing applied 03/18/21 1352   Wound Cleansed Cleansed with saline 04/01/21 1536   Dressing/Treatment Alginate; Collagen; Silicone border 38/24/69 1536   Offloading for Diabetic Foot Ulcers Post op shoe 04/01/21 1536   Wound Length (cm) 0 cm 04/05/21 0929   Wound Width (cm) 0 cm 04/05/21 0929   Wound Depth (cm) 0 cm 04/05/21 0929   Wound Surface Area (cm^2) 0 cm^2 04/05/21 0929   Change in Wound Size % (l*w) 100 04/05/21 0929   Wound Volume (cm^3) 0 cm^3 04/05/21 0929   Wound Healing % 100 04/05/21 0929   Post-Procedure Length (cm) 0 cm 04/05/21 1011   Post-Procedure Width (cm) 0 cm 04/05/21 1011   Post-Procedure Depth (cm) 0 cm 04/05/21 1011   Post-Procedure Surface Area (cm^2) 0 cm^2 04/05/21 1011   Post-Procedure Volume (cm^3) 0 cm^3 04/05/21 1011   Wound Assessment Fibrin 04/01/21 1357   Drainage Amount None 04/01/21 1357   Drainage Description Yellow 03/22/21 0911   Odor None 04/01/21 1357   Adrianne-wound Assessment Maceration 04/01/21 1357   Number of days: 252     Incision 02/19/21 Groin Right (Active)   Number of days: 269       Procedure Note  Indications:  Based on my examination of this patient's wound(s)/ulcer(s) today, debridement is required to promote healing and evaluate the wound base. Performed by: Ousmane Walters DPM    Consent obtained:  Yes    Time out taken:  Yes    Pain Control: Anesthetic  Anesthetic: 4% Lidocaine Liquid Topical     Debridement:Excisional Debridement    Using curette the wound(s)/ulcer(s) was/were sharply debrided down through and including the removal of subcutaneous tissue. Devitalized Tissue Debrided:  slough to stimulate bleeding to promote healing, post debridement good bleeding base and wound edges noted    Wound/Ulcer #: 2    Percent of Wound/Ulcer Debrided: 100%    Total Surface Area Debrided:  11.89 sq cm     Estimated Blood Loss:  None  Hemostasis Achieved:  by pressure    Procedural Pain:  2  / 10   Post Procedural Pain:  2 / 10     Response to treatment:  Well tolerated by patient. A culture was done.       Plan:     Pt is a smoker   - Discussed relationship of smoking and negative affects on wound healing   - Emphasized importance of tobacco avoidace/cessation   - Script for nicotine patch given to patient   - Information regarding support groups for smoking cessation given    In my professional opinion and based off the information that is available at this time this patient has appropriate indication for HBO Therapy: Maybe    Treatment Note please see attached Discharge Instructions    Written patient dismissal instructions given to patient and signed by patient or POA.            Electronically signed by Brett Pepe DPM on 11/15/2021 at 3:37 PM

## 2021-11-22 ENCOUNTER — HOSPITAL ENCOUNTER (OUTPATIENT)
Dept: WOUND CARE | Age: 61
Discharge: HOME | End: 2021-11-22
Payer: COMMERCIAL

## 2021-11-22 VITALS
WEIGHT: 207 LBS | HEIGHT: 71 IN | BODY MASS INDEX: 28.98 KG/M2 | HEART RATE: 71 BPM | RESPIRATION RATE: 16 BRPM | TEMPERATURE: 96.7 F | DIASTOLIC BLOOD PRESSURE: 68 MMHG | SYSTOLIC BLOOD PRESSURE: 134 MMHG

## 2021-11-22 DIAGNOSIS — L08.9 DIABETIC FOOT INFECTION (HCC): Primary | ICD-10-CM

## 2021-11-22 DIAGNOSIS — E11.628 DIABETIC FOOT INFECTION (HCC): Primary | ICD-10-CM

## 2021-11-22 PROCEDURE — 6370000000 HC RX 637 (ALT 250 FOR IP): Performed by: PODIATRIST

## 2021-11-22 PROCEDURE — 11042 DBRDMT SUBQ TIS 1ST 20SQCM/<: CPT

## 2021-11-22 RX ORDER — LIDOCAINE HYDROCHLORIDE 20 MG/ML
JELLY TOPICAL ONCE
Status: CANCELLED | OUTPATIENT
Start: 2021-11-22 | End: 2021-11-22

## 2021-11-22 RX ORDER — BACITRACIN ZINC AND POLYMYXIN B SULFATE 500; 1000 [USP'U]/G; [USP'U]/G
OINTMENT TOPICAL ONCE
Status: CANCELLED | OUTPATIENT
Start: 2021-11-22 | End: 2021-11-22

## 2021-11-22 RX ORDER — GENTAMICIN SULFATE 1 MG/G
OINTMENT TOPICAL ONCE
Status: CANCELLED | OUTPATIENT
Start: 2021-11-22 | End: 2021-11-22

## 2021-11-22 RX ORDER — LIDOCAINE 50 MG/G
OINTMENT TOPICAL ONCE
Status: CANCELLED | OUTPATIENT
Start: 2021-11-22 | End: 2021-11-22

## 2021-11-22 RX ORDER — CLOBETASOL PROPIONATE 0.5 MG/G
OINTMENT TOPICAL ONCE
Status: CANCELLED | OUTPATIENT
Start: 2021-11-22 | End: 2021-11-22

## 2021-11-22 RX ORDER — BETAMETHASONE DIPROPIONATE 0.05 %
OINTMENT (GRAM) TOPICAL ONCE
Status: CANCELLED | OUTPATIENT
Start: 2021-11-22 | End: 2021-11-22

## 2021-11-22 RX ORDER — GINSENG 100 MG
CAPSULE ORAL ONCE
Status: CANCELLED | OUTPATIENT
Start: 2021-11-22 | End: 2021-11-22

## 2021-11-22 RX ORDER — LIDOCAINE 40 MG/G
CREAM TOPICAL ONCE
Status: CANCELLED | OUTPATIENT
Start: 2021-11-22 | End: 2021-11-22

## 2021-11-22 RX ORDER — LIDOCAINE HYDROCHLORIDE 40 MG/ML
SOLUTION TOPICAL ONCE
Status: COMPLETED | OUTPATIENT
Start: 2021-11-22 | End: 2021-11-22

## 2021-11-22 RX ORDER — BACITRACIN, NEOMYCIN, POLYMYXIN B 400; 3.5; 5 [USP'U]/G; MG/G; [USP'U]/G
OINTMENT TOPICAL ONCE
Status: CANCELLED | OUTPATIENT
Start: 2021-11-22 | End: 2021-11-22

## 2021-11-22 RX ORDER — LIDOCAINE HYDROCHLORIDE 40 MG/ML
SOLUTION TOPICAL ONCE
Status: CANCELLED | OUTPATIENT
Start: 2021-11-22 | End: 2021-11-22

## 2021-11-22 RX ADMIN — LIDOCAINE HYDROCHLORIDE 3 ML: 40 SOLUTION TOPICAL at 15:49

## 2021-11-22 ASSESSMENT — PAIN DESCRIPTION - LOCATION: LOCATION: FOOT

## 2021-11-22 ASSESSMENT — PAIN DESCRIPTION - DESCRIPTORS: DESCRIPTORS: ACHING;DULL

## 2021-11-22 ASSESSMENT — PAIN DESCRIPTION - PROGRESSION: CLINICAL_PROGRESSION: GRADUALLY IMPROVING

## 2021-11-22 ASSESSMENT — PAIN DESCRIPTION - PAIN TYPE: TYPE: CHRONIC PAIN

## 2021-11-22 ASSESSMENT — PAIN - FUNCTIONAL ASSESSMENT: PAIN_FUNCTIONAL_ASSESSMENT: ACTIVITIES ARE NOT PREVENTED

## 2021-11-22 ASSESSMENT — PAIN SCALES - GENERAL: PAINLEVEL_OUTOF10: 4

## 2021-11-22 ASSESSMENT — PAIN DESCRIPTION - ORIENTATION: ORIENTATION: LEFT

## 2021-11-22 ASSESSMENT — PAIN DESCRIPTION - ONSET: ONSET: GRADUAL

## 2021-11-22 ASSESSMENT — PAIN DESCRIPTION - FREQUENCY: FREQUENCY: INTERMITTENT

## 2021-11-22 NOTE — PROGRESS NOTES
hold off on partial calcanectomy. FU 1 week. Patient gave me consent (verbal) to speak with his brother Daquan Houser regarding his at home arrangement. HBOT is on hold until patient is at home. 7/12/21 -  Alginate and add gentamicin ointment QD. HBOT referral.  Patient considering partial calcanectomy with flap. FU 1 week. 7/19/21 - Alginate and add gentamicin ointment QD. HBOT referral.  Patient considering partial calcanectomy with flap. FU 1 week. 8/2/21 - Alginate and add gentamicin ointment QD. HBOT referral.  Patient considering partial calcanectomy with flap. FU 1 week. 8/16/21 - Alginate and add gentamicin ointment QD. HBOT referral.  Patient considering partial calcanectomy with flap. FU 1 week. 8/23/21 - Alginate and add gentamicin ointment QD. HBOT referral.  Patient considering partial calcanectomy with flap. FU 1 week. 8/30/21 - Alginate and add gentamicin ointment QD. HBOT referral.  Patient considering partial calcanectomy with flap. FU 1 week. 9/20/21 - Alginate and add gentamicin ointment QD. HBOT referral.  Patient considering partial calcanectomy with flap. FU 1 week. 10/25/21 Alginate and add gentamicin ointment QD. HBOT referral.  Patient considering partial calcanectomy with flap. FU 1 week. 11/1/21 Alginate and add gentamicin ointment QD. HBOT referral.  Patient considering partial calcanectomy with flap. FU 1 week. 11/8/21 - Alginate and add gentamicin ointment QD. HBOT referral.  Patient considering partial calcanectomy with flap. FU 1 week. 11/15/21 - Alginate and add gentamicin ointment QD. HBOT referral.  Patient considering partial calcanectomy with flap. FU 1 week. 11/22/21 -  Alginate and add gentamicin ointment QD. HBOT referral.  Patient considering partial calcanectomy with flap. FU 1 week.       Wound/Ulcer Pain Timing/Severity: none  Quality of pain: N/A  Severity:  0 / 10   Modifying Factors: None  Associated Signs/Symptoms: none    Ulcer Identification:  Ulcer Type: arterial and diabetic  Contributing Factors: diabetes    Diabetic/Pressure/Non Pressure Ulcers onl y:  Ulcer: Diabetic ulcer, fat layer exposed    If patient has diabetic lower extremity wounds  Kong Classification of diabetic lower extremity wounds:    Grade Description   []  0 No open wound   []  1 Superficial ulcer involving the full skin thickness   [x]  2 Deep ulcer involves ligament, tendon, joint capsule, or fascia  No bone involvement or abscess presence   []  3 Deep Ulcer with abcess formation and/or osteomyelitis   []  4 Localized gangrene   []  5 Extensive gangrene of the foot     Wound: N/A        PAST MEDICAL HISTORY      Diagnosis Date    [facility]erebral [facility] occlusion [facility] [address] [facility]     Diabetes mellitus ([facility] [address] [facility]     Type 2    Hemiparesis affecting left side as late effect of cerebrovascular accident ([facility] [address]     LEFT SIDE NON DOMINANT FOLLOWING STROKE    Hypertension      Past Surgical History:   Procedure Laterality Date    FINGER AMPUTATION      FOOT DEBRIDEMENT Left 2/16/2021    LEFT FOOT DEBRIDEMENT WITH BONE BIOPSY, WOUND VAC APPLICATION performed by Abby Rosado DPM at [place] [address] ARTHROSCOPY      TONSILLECTOMY      TRANSESOPHAGEAL ECHOCARDIOGRAM N/A 2/22/2021    TRANSESOPHAGEAL ECHOCARDIOGRAM WITH BUBBLE STUDY performed by Pippa Liang MD at [address] [facility] N/A 1/4/2021    EGD BIOPSY performed by Je Woodward DO at [place] [address]     History reviewed. No pertinent family history.   Social History     Tobacco Use    Smoking status: Former Smoker    Smokeless tobacco: Never Used   Vaping Use    Vaping Use: Never used   Substance Use Topics    Alcohol use: Not Currently     Comment: Former every day drinker for most of adult life    Drug use: No     Allergies   Allergen Reactions    Pcn [Penicillins]      Current Outpatient Medications on File Prior to [employer] Medication Sig Dispense Refill    diphenhydrAMINE (BENADRYL ALLERGY) 25 MG capsule Take 25 mg by mouth every 6 hours as needed for Itching      ciprofloxacin (CIPRO) 500 MG tablet Take 500 mg by mouth 2 times daily      gabapentin (NEURONTIN) 100 MG capsule Take 100 mg by mouth 3 times daily.  doxycycline hyclate (VIBRAMYCIN) 100 MG capsule Take 100 mg by mouth 2 times daily      B Complex-C-E-Zn (STRESS B/ZINC) TABS Take 1 tablet by mouth daily      acetaminophen (TYLENOL) 325 MG tablet Take 650 mg by mouth every 6 hours as needed for Pain      bisacodyl (DULCOLAX) 10 MG suppository Place 10 mg rectally daily      ferrous sulfate (IRON 325) 325 (65 Fe) MG tablet Take 325 mg by mouth daily (with breakfast)      magnesium hydroxide (MILK OF MAGNESIA CONCENTRATE) 2400 MG/10ML SUSP Take 30 mLs by mouth daily as needed      glucose (GLUTOSE) 40 % GEL Take 37.5 mLs by mouth as needed (hypoglycemia) 45 g 1    glucagon, rDNA, 1 MG injection Inject 1 mg into the muscle as needed for Low blood sugar (Blood glucose less than 70 mg/dL and patient NOT ALERT or NPO and does not have IV access. ) 1 each 0    vitamin D (ERGOCALCIFEROL) 1.25 MG (80743 UT) CAPS capsule Take 1 capsule by mouth once a week 5 capsule 0    ipratropium-albuterol (DUONEB) 0.5-2.5 (3) MG/3ML SOLN nebulizer solution Inhale 3 mLs into the lungs every 4 hours 360 mL 0    pantoprazole (PROTONIX) 40 MG tablet Take 1 tablet by mouth every morning (before breakfast) 30 tablet 3    melatonin 3 MG TABS tablet Take 9 mg by mouth nightly as needed      aspirin 81 MG EC tablet Take 81 mg by mouth daily      hydroCHLOROthiazide (HYDRODIURIL) 25 MG tablet Take 25 mg by mouth daily      glimepiride (AMARYL) 2 MG tablet Take 1 tablet by mouth daily (with breakfast) 30 tablet 3    metFORMIN (GLUCOPHAGE) 500 MG tablet Take 1 tablet by mouth 2 times daily (with meals) 60 tablet 3    atorvastatin (LIPITOR) 40 MG tablet Take 1 tablet by mouth nightly 30 tablet 3    clopidogrel (PLAVIX) 75 MG tablet Take 1 tablet by mouth daily 30 tablet 3     No current facility-administered medications on file prior to encounter.        REVIEW OF SYSTEMS   ROS : All others Negative if blank [], Positive if [x]  General Vascular   [] Fevers [] Claudication   [] Chills [] Rest Pain   Skin Neurologic   [x] Tissue Loss [x] Lower extremity neuropathy     Objective:    /68   Pulse 71   Temp 96.7 °F (35.9 °C) (Temporal)   Resp 16   Ht 5' 11\" (1.803 m)   Wt 207 lb (93.9 kg)   BMI 28.87 kg/m²   Wt Readings from Last 3 Encounters:   11/22/21 207 lb (93.9 kg)   11/15/21 207 lb (93.9 kg)   11/08/21 207 lb (93.9 kg)       PHYSICAL EXAM   CONSTITUTIONAL:   Awake, alert, cooperative   PSYCHIATRIC :  Oriented to time, place and person      normal insight to disease process  EXTREMITIES:   R LE Open wounds are noted   Skin color is normal   Edema is not noted   Sensation deficit noted - to foot   Palpation of the foot does cause pain   5/5 strength DF/PF  L LE Open wounds are noted   Skin color is abnormal with hyperpigmentation   Edema is not noted   Sensation deficit noted - bilateral feet   Palpation of the foot does cause pain   5/5 strength DF/PF  R dorsalis pedis +2 L dorsalis pedis +2   R posterior tibial +2 L posterior tibial +2     Assessment:     Problem List Items Addressed This Visit     Diabetic foot infection ([facility] [address] - Primary    Relevant Orders    Initiate Outpatient Wound Care Protocol          Pre Debridement Measurements:  Are located in the [place]  Documentation Flow Sheet  Post Debridement Measurements:  Wound/Ulcer Descriptions are Pre Debridement except measurements:     Wound 12/26/20 Arm Left (Active)   Number of days: 330       Wound 02/15/21 Heel Left (Active)   Number of days: 280       Wound 02/15/21 Heel Right (Active)   Number of days: 280       Wound 03/08/21 Heel Left #1 left heel aquired 12/1/20 (Active)   Wound Image   10/04/21 1456   Dressing Status New dressing applied 11/15/21 1535   Wound Cleansed Cleansed with saline 11/15/21 1535   Dressing/Treatment Alginate; ABD; Dry dressing; Roll gauze 11/15/21 1535   Offloading for Diabetic Foot Ulcers Post op shoe 11/15/21 1535   Wound Length (cm) 4.2 cm 11/22/21 1543   Wound Width (cm) 3.2 cm 11/22/21 1543   Wound Depth (cm) 0.7 cm 11/22/21 1543   Wound Surface Area (cm^2) 13.44 cm^2 11/22/21 1543   Change in Wound Size % (l*w) -11.17 11/22/21 1543   Wound Volume (cm^3) 9.408 cm^3 11/22/21 1543   Wound Healing % -11 11/22/21 1543   Post-Procedure Length (cm) 4.2 cm 11/22/21 1603   Post-Procedure Width (cm) 3.2 cm 11/22/21 1603   Post-Procedure Depth (cm) 0.7 cm 11/22/21 1603   Post-Procedure Surface Area (cm^2) 13.44 cm^2 11/22/21 1603   Post-Procedure Volume (cm^3) 9.408 cm^3 11/22/21 1603   Undermining Starts ___ O'Clock 6 07/19/21 1054   Undermining Ends___ O'Clock 3 07/19/21 1054   Undermining Maxium Distance (cm) 0 10/04/21 1456   Wound Assessment [place]/red; [facility] 11/22/21 1543   Drainage Amount Moderate 11/22/21 1543   Drainage Description Serous 11/22/21 1543   Odor None 11/22/21 1543   Adrianne-wound Assessment Maceration 11/22/21 1543   Number of days: 259       Wound 03/08/21 Heel Right #2 rt heel aquired 12/1/20 (Active)   Wound Image   04/05/21 0929   Dressing Status New dressing applied 03/18/21 1352   Wound Cleansed Cleansed with saline 04/01/21 1536   Dressing/Treatment Alginate; Collagen; Silicone border 53/74/04 1536   Offloading for Diabetic Foot Ulcers Post op shoe 04/01/21 1536   Wound Length (cm) 0 cm 04/05/21 0929   Wound Width (cm) 0 cm 04/05/21 0929   Wound Depth (cm) 0 cm 04/05/21 0929   Wound Surface Area (cm^2) 0 cm^2 04/05/21 0929   Change in Wound Size % (l*w) 100 04/05/21 0929   Wound Volume (cm^3) 0 cm^3 04/05/21 0929   Wound Healing % 100 04/05/21 0929   Post-Procedure Length (cm) 0 cm 04/05/21 1011   Post-Procedure Width (cm) 0 cm 04/05/21 1011   Post-Procedure Depth (cm) 0 cm 04/05/21 1011   Post-Procedure Surface Area (cm^2) 0 cm^2 04/05/21 1011   Post-Procedure Volume (cm^3) 0 cm^3 04/05/21 1011   Wound Assessment Fibrin 04/01/21 1357   Drainage Amount None 04/01/21 1357   Drainage Description Yellow 03/22/21 0911   Odor None 04/01/21 1357   Adrianne-wound Assessment Maceration 04/01/21 1357   Number of days: 259     Incision 02/19/21 Groin Right (Active)   Number of days: 276       Procedure Note  Indications:  Based on my examination of this patient's wound(s)/ulcer(s) today, debridement is required to promote healing and evaluate the wound base. Performed by: Kimberly Gan DPM    Consent obtained:  Yes    Time out taken:  Yes    Pain Control: Anesthetic  Anesthetic: 4% Lidocaine Liquid Topical     Debridement:Excisional Debridement    Using curette the wound(s)/ulcer(s) was/were sharply debrided down through and including the removal of subcutaneous tissue. Devitalized Tissue Debrided:  slough to stimulate bleeding to promote healing, post debridement good bleeding base and wound edges noted    Wound/Ulcer #: 2    Percent of Wound/Ulcer Debrided: 100%    Total Surface Area Debrided:  13.44sq cm     Estimated Blood Loss:  None  Hemostasis Achieved:  by pressure    Procedural Pain:  2  / 10   Post Procedural Pain:  2 / 10     Response to treatment:  Well tolerated by patient. A culture was done. Plan:     Pt is a smoker   - Discussed relationship of smoking and negative affects on wound healing   - Emphasized importance of tobacco avoidace/cessation   - Script for nicotine patch given to patient   - Information regarding support groups for smoking cessation given    In my professional opinion and based off the information that is available at this time this patient has appropriate indication for HBO Therapy: Maybe    Treatment Note please see attached Discharge Instructions    Written patient dismissal instructions given to patient and signed by patient or POA. Electronically signed by Brett Pepe DPM on 11/22/2021 at 4:09 PM

## 2021-11-29 ENCOUNTER — HOSPITAL ENCOUNTER (OUTPATIENT)
Dept: WOUND CARE | Age: 61
Discharge: HOME | End: 2021-11-29
Payer: COMMERCIAL

## 2021-11-29 VITALS
DIASTOLIC BLOOD PRESSURE: 80 MMHG | HEART RATE: 87 BPM | BODY MASS INDEX: 28.87 KG/M2 | WEIGHT: 207 LBS | TEMPERATURE: 97.9 F | SYSTOLIC BLOOD PRESSURE: 140 MMHG | RESPIRATION RATE: 16 BRPM

## 2021-11-29 DIAGNOSIS — E11.628 DIABETIC FOOT INFECTION (HCC): Primary | ICD-10-CM

## 2021-11-29 DIAGNOSIS — L08.9 DIABETIC FOOT INFECTION (HCC): Primary | ICD-10-CM

## 2021-11-29 PROCEDURE — 87205 SMEAR GRAM STAIN: CPT

## 2021-11-29 PROCEDURE — 87077 CULTURE AEROBIC IDENTIFY: CPT

## 2021-11-29 PROCEDURE — 87075 CULTR BACTERIA EXCEPT BLOOD: CPT

## 2021-11-29 PROCEDURE — 11042 DBRDMT SUBQ TIS 1ST 20SQCM/<: CPT

## 2021-11-29 PROCEDURE — 87186 SC STD MICRODIL/AGAR DIL: CPT

## 2021-11-29 PROCEDURE — 87070 CULTURE OTHR SPECIMN AEROBIC: CPT

## 2021-11-29 PROCEDURE — 6370000000 HC RX 637 (ALT 250 FOR IP): Performed by: PODIATRIST

## 2021-11-29 RX ORDER — LIDOCAINE HYDROCHLORIDE 40 MG/ML
SOLUTION TOPICAL ONCE
Status: CANCELLED | OUTPATIENT
Start: 2021-11-29 | End: 2021-11-29

## 2021-11-29 RX ORDER — LIDOCAINE HYDROCHLORIDE 20 MG/ML
JELLY TOPICAL ONCE
Status: CANCELLED | OUTPATIENT
Start: 2021-11-29 | End: 2021-11-29

## 2021-11-29 RX ORDER — BETAMETHASONE DIPROPIONATE 0.05 %
OINTMENT (GRAM) TOPICAL ONCE
Status: CANCELLED | OUTPATIENT
Start: 2021-11-29 | End: 2021-11-29

## 2021-11-29 RX ORDER — GINSENG 100 MG
CAPSULE ORAL ONCE
Status: CANCELLED | OUTPATIENT
Start: 2021-11-29 | End: 2021-11-29

## 2021-11-29 RX ORDER — BACITRACIN, NEOMYCIN, POLYMYXIN B 400; 3.5; 5 [USP'U]/G; MG/G; [USP'U]/G
OINTMENT TOPICAL ONCE
Status: CANCELLED | OUTPATIENT
Start: 2021-11-29 | End: 2021-11-29

## 2021-11-29 RX ORDER — BACITRACIN ZINC AND POLYMYXIN B SULFATE 500; 1000 [USP'U]/G; [USP'U]/G
OINTMENT TOPICAL ONCE
Status: CANCELLED | OUTPATIENT
Start: 2021-11-29 | End: 2021-11-29

## 2021-11-29 RX ORDER — GENTAMICIN SULFATE 1 MG/G
OINTMENT TOPICAL ONCE
Status: CANCELLED | OUTPATIENT
Start: 2021-11-29 | End: 2021-11-29

## 2021-11-29 RX ORDER — LIDOCAINE 50 MG/G
OINTMENT TOPICAL ONCE
Status: CANCELLED | OUTPATIENT
Start: 2021-11-29 | End: 2021-11-29

## 2021-11-29 RX ORDER — LIDOCAINE HYDROCHLORIDE 40 MG/ML
SOLUTION TOPICAL ONCE
Status: COMPLETED | OUTPATIENT
Start: 2021-11-29 | End: 2021-11-29

## 2021-11-29 RX ORDER — LIDOCAINE 40 MG/G
CREAM TOPICAL ONCE
Status: CANCELLED | OUTPATIENT
Start: 2021-11-29 | End: 2021-11-29

## 2021-11-29 RX ORDER — CLOBETASOL PROPIONATE 0.5 MG/G
OINTMENT TOPICAL ONCE
Status: CANCELLED | OUTPATIENT
Start: 2021-11-29 | End: 2021-11-29

## 2021-11-29 RX ADMIN — LIDOCAINE HYDROCHLORIDE 10 ML: 40 SOLUTION TOPICAL at 15:09

## 2021-11-29 NOTE — PROGRESS NOTES
Wound Healing Center  History and Physical/Consultation  Podiatry    Referring Physician : Ivan Smith MD  [id number] [facility] RECORD NUMBER:  [id number]  AGE: 57 y.o. GENDER: male  : 1960  EPISODE DATE:  2021  Subjective:     Chief Complaint   Patient presents with    Wound Check     Left foot         HISTORY of PRESENT ILLNESS HPI     Aaron Weston is a 57 y.o. male who presents today for wound/ulcer evaluation. History of Wound Context:  The patient has had a wound of bilateral heels which was first noted approximately over a month ago. This has been treated with surgical debridement. On their initial visit to the wound healing center, 21 ,  the patient has noted that the wound has not been improving. The patient has had similar previous wounds in the past.      Pt is not on abx at time of initial visit. 3/22/21 - Wound VAC MWF continue to left heel. Aquacel Ag to right heel. Debrided toenails 1-5 in thickness and length. FU 1 week. IV Abx per Dr. Pramod Irving. 21 - Wound VAC MWF to left heel. Cultures obtained. FU 1 week after visit with Dr. Pramod Irving. PO Abx.  21 - DC wound VAC to left heel. Alginate to wound. FU 1 week  21 - Alginate and gentamicin ointment QOD. Partial WB to heel at 50% to foot with surgical shoe and walker. 5/3/21 - Alginate and gentamicin ointment QOD. MRI left heel    5/10/21 - Alginate and gentamicin ointment QOD. MRI left heel pending. 21 - Alginate and gentamicin ointment QOD. MRI reviewed confirming OM. Discussed HBOT referral and partial calcanectomy. He opts for HBOT consultation. 21 - Alginate and add gentamicin ointment QD. HBOT referral.  Patient hold off on partial calcanectomy. FU 1 week    21 - Alginate and add gentamicin ointment QD. HBOT referral.  Patient hold off on partial calcanectomy. FU 1 week     21 - Alginate and add gentamicin ointment QD.   HBOT referral.  Patient hold off on partial calcanectomy. FU 1 week. Patient gave me consent (verbal) to speak with his brother Garett Marti regarding his at home arrangement. HBOT is on hold until patient is at home. 7/12/21 -  Alginate and add gentamicin ointment QD. HBOT referral.  Patient considering partial calcanectomy with flap. FU 1 week. 7/19/21 - Alginate and add gentamicin ointment QD. HBOT referral.  Patient considering partial calcanectomy with flap. FU 1 week. 8/2/21 - Alginate and add gentamicin ointment QD. HBOT referral.  Patient considering partial calcanectomy with flap. FU 1 week. 8/16/21 - Alginate and add gentamicin ointment QD. HBOT referral.  Patient considering partial calcanectomy with flap. FU 1 week. 8/23/21 - Alginate and add gentamicin ointment QD. HBOT referral.  Patient considering partial calcanectomy with flap. FU 1 week. 8/30/21 - Alginate and add gentamicin ointment QD. HBOT referral.  Patient considering partial calcanectomy with flap. FU 1 week. 9/20/21 - Alginate and add gentamicin ointment QD. HBOT referral.  Patient considering partial calcanectomy with flap. FU 1 week. 10/25/21 Alginate and add gentamicin ointment QD. HBOT referral.  Patient considering partial calcanectomy with flap. FU 1 week. 11/1/21 Alginate and add gentamicin ointment QD. HBOT referral.  Patient considering partial calcanectomy with flap. FU 1 week. 11/8/21 - Alginate and add gentamicin ointment QD. HBOT referral.  Patient considering partial calcanectomy with flap. FU 1 week. 11/15/21 - Alginate and add gentamicin ointment QD. HBOT referral.  Patient considering partial calcanectomy with flap. FU 1 week. 11/22/21 -  Alginate and add gentamicin ointment QD. HBOT referral.  Patient considering partial calcanectomy with flap. FU 1 week. 11/29/21 0 Alginate and gentamicin ointment. Possible DC home.   HBOT referral.  Patient considering partial calcanectomy with flap. FU 1 week. Wound/Ulcer Pain Timing/Severity: none  Quality of pain: N/A  Severity:  0 / 10   Modifying Factors: None  Associated Signs/Symptoms: none    Ulcer Identification:  Ulcer Type: arterial and diabetic  Contributing Factors: diabetes    Diabetic/Pressure/Non Pressure Ulcers onl y:  Ulcer: Diabetic ulcer, fat layer exposed    If patient has diabetic lower extremity wounds  Kong Classification of diabetic lower extremity wounds:    Grade Description   []  0 No open wound   []  1 Superficial ulcer involving the full skin thickness   [x]  2 Deep ulcer involves ligament, tendon, joint capsule, or fascia  No bone involvement or abscess presence   []  3 Deep Ulcer with abcess formation and/or osteomyelitis   []  4 Localized gangrene   []  5 Extensive gangrene of the foot     Wound: N/A        PAST MEDICAL HISTORY      Diagnosis Date    Cerebral [facility] occlusion with [facility] [address]     Diabetes mellitus ([facility] [address]     Type 2    Hemiparesis affecting left side as late effect of cerebrovascular accident ([facility] [address]     LEFT SIDE NON DOMINANT FOLLOWING STROKE    Hypertension      Past Surgical History:   Procedure Laterality Date    FINGER AMPUTATION      FOOT DEBRIDEMENT Left 2/16/2021    LEFT FOOT DEBRIDEMENT WITH BONE BIOPSY, WOUND VAC APPLICATION performed by Janis Ferguson DPM at [place] [address] ARTHROSCOPY      TONSILLECTOMY      TRANSESOPHAGEAL ECHOCARDIOGRAM N/A 2/22/2021    TRANSESOPHAGEAL ECHOCARDIOGRAM WITH BUBBLE STUDY performed by Chuy Ly MD at [facility] N/A 1/4/2021    EGD BIOPSY performed by Sage Suh DO at [place] [address]     History reviewed. No pertinent family history.   Social History     Tobacco Use    Smoking status: Former Smoker    Smokeless tobacco: Never Used   Vaping Use    Vaping Use: Never used   Substance Use Topics    Alcohol use: Not Currently     Comment: Former every day drinker for most of adult life    Drug use: No     Allergies   Allergen Reactions    Pcn [Penicillins]      Current Outpatient Medications on File Prior to Encounter   Medication Sig Dispense Refill    diphenhydrAMINE (BENADRYL ALLERGY) 25 MG capsule Take 25 mg by mouth every 6 hours as needed for Itching      ciprofloxacin (CIPRO) 500 MG tablet Take 500 mg by mouth 2 times daily      gabapentin (NEURONTIN) 100 MG capsule Take 100 mg by mouth 3 times daily.  doxycycline hyclate (VIBRAMYCIN) 100 MG capsule Take 100 mg by mouth 2 times daily      B Complex-C-E-Zn (STRESS B/ZINC) TABS Take 1 tablet by mouth daily      acetaminophen (TYLENOL) 325 MG tablet Take 650 mg by mouth every 6 hours as needed for Pain      bisacodyl (DULCOLAX) 10 MG suppository Place 10 mg rectally daily      ferrous sulfate (IRON 325) 325 (65 Fe) MG tablet Take 325 mg by mouth daily (with breakfast)      magnesium hydroxide (MILK OF MAGNESIA CONCENTRATE) 2400 MG/10ML SUSP Take 30 mLs by mouth daily as needed      glucose (GLUTOSE) 40 % GEL Take 37.5 mLs by mouth as needed (hypoglycemia) 45 g 1    glucagon, rDNA, 1 MG injection Inject 1 mg into the muscle as needed for Low blood sugar (Blood glucose less than 70 mg/dL and patient NOT ALERT or NPO and does not have IV access. ) 1 each 0    vitamin D (ERGOCALCIFEROL) 1.25 MG (92286 UT) CAPS capsule Take 1 capsule by mouth once a week 5 capsule 0    ipratropium-albuterol (DUONEB) 0.5-2.5 (3) MG/3ML SOLN nebulizer solution Inhale 3 mLs into the lungs every 4 hours 360 mL 0    pantoprazole (PROTONIX) 40 MG tablet Take 1 tablet by mouth every morning (before breakfast) 30 tablet 3    melatonin 3 MG TABS tablet Take 9 mg by mouth nightly as needed      aspirin 81 MG EC tablet Take 81 mg by mouth daily      hydroCHLOROthiazide (HYDRODIURIL) 25 MG tablet Take 25 mg by mouth daily      glimepiride (AMARYL) 2 MG tablet Take 1 tablet by mouth daily (with breakfast) 30 tablet 3    metFORMIN (GLUCOPHAGE) 500 days: 287       Wound 03/08/21 Heel Left #1 left heel aquired 12/1/20 (Active)   Wound Image   11/29/21 1504   Dressing Status New dressing applied 11/22/21 1617   Wound Cleansed Cleansed with saline 11/22/21 1617   Dressing/Treatment ABD; Dry dressing;  Pharmaceutical agent (see MAR) 11/22/21 1617   Offloading for Diabetic Foot Ulcers Post op shoe 11/22/21 1617   Wound Length (cm) 4.3 cm 11/29/21 1504   Wound Width (cm) 2.9 cm 11/29/21 1504   Wound Depth (cm) 0.8 cm 11/29/21 1504   Wound Surface Area (cm^2) 12.47 cm^2 11/29/21 1504   Change in Wound Size % (l*w) -3.14 11/29/21 1504   Wound Volume (cm^3) 9.976 cm^3 11/29/21 1504   Wound Healing % -18 11/29/21 1504   Post-Procedure Length (cm) 4.4 cm 11/29/21 1522   Post-Procedure Width (cm) 3 cm 11/29/21 1522   Post-Procedure Depth (cm) 0.9 cm 11/29/21 1522   Post-Procedure Surface Area (cm^2) 13.2 cm^2 11/29/21 1522   Post-Procedure Volume (cm^3) 11.88 cm^3 11/29/21 1522   Undermining Starts ___ O'Clock 6 07/19/21 1054   Undermining Ends___ O'Clock 3 07/19/21 1054   Undermining Maxium Distance (cm) 0 10/04/21 1456   Wound Assessment Fibrin; Pale granulation tissue 11/29/21 1504   Drainage Amount Large 11/29/21 1504   Drainage Description Yellow 11/29/21 1504   Odor None 11/29/21 1504   Adrianne-wound Assessment Intact 11/29/21 1504   Number of days: 266       Wound 03/08/21 Heel Right #2 rt heel aquired 12/1/20 (Active)   Wound Image   04/05/21 0929   Dressing Status New dressing applied 03/18/21 1352   Wound Cleansed Cleansed with saline 04/01/21 1536   Dressing/Treatment Alginate; Collagen; Silicone border 36/38/59 1536   Offloading for Diabetic Foot Ulcers Post op shoe 04/01/21 1536   Wound Length (cm) 0 cm 04/05/21 0929   Wound Width (cm) 0 cm 04/05/21 0929   Wound Depth (cm) 0 cm 04/05/21 0929   Wound Surface Area (cm^2) 0 cm^2 04/05/21 0929   Change in Wound Size % (l*w) 100 04/05/21 0929   Wound Volume (cm^3) 0 cm^3 04/05/21 0929   Wound Healing % 100 04/05/21 4851   Post-Procedure Length (cm) 0 cm 04/05/21 1011   Post-Procedure Width (cm) 0 cm 04/05/21 1011   Post-Procedure Depth (cm) 0 cm 04/05/21 1011   Post-Procedure Surface Area (cm^2) 0 cm^2 04/05/21 1011   Post-Procedure Volume (cm^3) 0 cm^3 04/05/21 1011   Wound Assessment Fibrin 04/01/21 1357   Drainage Amount None 04/01/21 1357   Drainage Description Yellow 03/22/21 0911   Odor None 04/01/21 1357   Adrianne-wound Assessment Maceration 04/01/21 1357   Number of days: 266     Incision 02/19/21 Groin Right (Active)   Number of days: 283       Procedure Note  Indications:  Based on my examination of this patient's wound(s)/ulcer(s) today, debridement is required to promote healing and evaluate the wound base. Performed by: Brett Pepe DPM    Consent obtained:  Yes    Time out taken:  Yes    Pain Control: Anesthetic  Anesthetic: 4% Lidocaine Liquid Topical     Debridement:Excisional Debridement    Using curette the wound(s)/ulcer(s) was/were sharply debrided down through and including the removal of subcutaneous tissue. Devitalized Tissue Debrided:  slough to stimulate bleeding to promote healing, post debridement good bleeding base and wound edges noted    Wound/Ulcer #: 2    Percent of Wound/Ulcer Debrided: 100%    Total Surface Area Debrided:  13.44sq cm     Estimated Blood Loss:  None  Hemostasis Achieved:  by pressure    Procedural Pain:  2  / 10   Post Procedural Pain:  2 / 10     Response to treatment:  Well tolerated by patient. A culture was done.       Plan:     Pt is a smoker   - Discussed relationship of smoking and negative affects on wound healing   - Emphasized importance of tobacco avoidace/cessation   - Script for nicotine patch given to patient   - Information regarding support groups for smoking cessation given    In my professional opinion and based off the information that is available at this time this patient has appropriate indication for HBO Therapy: Maybe    Treatment Note please see attached Discharge Instructions    Written patient dismissal instructions given to patient and signed by patient or POA.            Electronically signed by Delaney Gabriel DPM on 11/29/2021 at 3:26 PM

## 2021-12-01 LAB — ANAEROBIC CULTURE: NORMAL

## 2021-12-02 ENCOUNTER — HOSPITAL ENCOUNTER (OUTPATIENT)
Dept: WOUND CARE | Age: 61
Discharge: HOME | End: 2021-12-02
Payer: COMMERCIAL

## 2021-12-02 VITALS
HEART RATE: 62 BPM | TEMPERATURE: 97 F | WEIGHT: 207 LBS | RESPIRATION RATE: 16 BRPM | BODY MASS INDEX: 28.98 KG/M2 | SYSTOLIC BLOOD PRESSURE: 128 MMHG | DIASTOLIC BLOOD PRESSURE: 58 MMHG | HEIGHT: 71 IN

## 2021-12-02 DIAGNOSIS — L08.9 DIABETIC FOOT INFECTION (HCC): Primary | ICD-10-CM

## 2021-12-02 DIAGNOSIS — E11.628 DIABETIC FOOT INFECTION (HCC): Primary | ICD-10-CM

## 2021-12-02 LAB
GRAM STAIN RESULT: ABNORMAL
ORGANISM: ABNORMAL
WOUND/ABSCESS: ABNORMAL

## 2021-12-02 PROCEDURE — 99213 OFFICE O/P EST LOW 20 MIN: CPT

## 2021-12-02 RX ORDER — GENTAMICIN SULFATE 1 MG/G
OINTMENT TOPICAL ONCE
Status: CANCELLED | OUTPATIENT
Start: 2021-12-02 | End: 2021-12-02

## 2021-12-02 RX ORDER — LIDOCAINE HYDROCHLORIDE 20 MG/ML
JELLY TOPICAL ONCE
Status: CANCELLED | OUTPATIENT
Start: 2021-12-02 | End: 2021-12-02

## 2021-12-02 RX ORDER — LIDOCAINE 50 MG/G
OINTMENT TOPICAL ONCE
Status: CANCELLED | OUTPATIENT
Start: 2021-12-02 | End: 2021-12-02

## 2021-12-02 RX ORDER — BETAMETHASONE DIPROPIONATE 0.05 %
OINTMENT (GRAM) TOPICAL ONCE
Status: CANCELLED | OUTPATIENT
Start: 2021-12-02 | End: 2021-12-02

## 2021-12-02 RX ORDER — BACITRACIN ZINC AND POLYMYXIN B SULFATE 500; 1000 [USP'U]/G; [USP'U]/G
OINTMENT TOPICAL ONCE
Status: CANCELLED | OUTPATIENT
Start: 2021-12-02 | End: 2021-12-02

## 2021-12-02 RX ORDER — GINSENG 100 MG
CAPSULE ORAL ONCE
Status: CANCELLED | OUTPATIENT
Start: 2021-12-02 | End: 2021-12-02

## 2021-12-02 RX ORDER — LIDOCAINE 40 MG/G
CREAM TOPICAL ONCE
Status: CANCELLED | OUTPATIENT
Start: 2021-12-02 | End: 2021-12-02

## 2021-12-02 RX ORDER — CEFDINIR 300 MG/1
300 CAPSULE ORAL 2 TIMES DAILY
Qty: 60 CAPSULE | Refills: 0 | Status: SHIPPED | OUTPATIENT
Start: 2021-12-02 | End: 2021-12-22 | Stop reason: SDUPTHER

## 2021-12-02 RX ORDER — CLOBETASOL PROPIONATE 0.5 MG/G
OINTMENT TOPICAL ONCE
Status: CANCELLED | OUTPATIENT
Start: 2021-12-02 | End: 2021-12-02

## 2021-12-02 RX ORDER — LIDOCAINE HYDROCHLORIDE 40 MG/ML
SOLUTION TOPICAL ONCE
Status: CANCELLED | OUTPATIENT
Start: 2021-12-02 | End: 2021-12-02

## 2021-12-02 RX ORDER — BACITRACIN, NEOMYCIN, POLYMYXIN B 400; 3.5; 5 [USP'U]/G; MG/G; [USP'U]/G
OINTMENT TOPICAL ONCE
Status: CANCELLED | OUTPATIENT
Start: 2021-12-02 | End: 2021-12-02

## 2021-12-02 NOTE — PROGRESS NOTES
[facility] Followup Visit Note  Referring Physician : Yoshi Ford MD  [id number] [facility] RECORD NUMBER:  [id number]  AGE: 57 y.o. GENDER: male  : 1960  EPISODE DATE:  2021  Subjective:     Chief Complaint   Patient presents with    Wound Check     left heel      HISTORY of PRESENT ILLNESS HPI   Go Garcia is a 57 y.o. male who presents today in regards to follow up evaluation and treatment of wound/ulcer. That patient's past medical, family and social hx were reviewed and changes were made if present. History of Wound Context:  Pt is known to ID s/p D/c from [facility] on  2021  Came in with 1. Left heel ulceration and necrosis of muscle. 2.  Peripheral arterial disease. 3. Acute osteomyelitis, left foot. 4. Cellulitis, left foot with abscess.     Current visit:  2021  Pt doing well no new issues he is at home    10/28/2021  Currently wound bed looks healthy    wound cx vse/corynebacterium  On cipro/doxy    7/15/2021  NAD NO ISSUES WITH ATBX CIPRO/DOXY   LEFT HEEL WOUND SAME NO SIGNS OF INFECTION     2021  Has no c/o  On doxy  Was supposed to be on cipro also   Using gent ointment   vacc off    6/3/2021  A wound culture SHOWED MRSA  MRI SHOWED ACUTE OM/MYOSITIS/CELLULITS  FINISHED  LINEZOLID 600MG PO Q12  ON DOXY   RECX   NO F/C/N/V/D/  50% WEIGHT BEARING    2021  IN WC NAD NO C/O  NO PAIN LE   MRI NOTED   ON DOXY     2021  Still at f on doxycycline he has no isues with it  Left heel has some pain  He is asking about more weight bearing     Sp vancomycin (VANCOCIN) 1,000 mg  Q12H can stop today/2021  Here for f/u left post heel ulcer   Has wound vacc   Currently off has bone palpable  periwound inatct   has left >right ble swelling   No calf pain   Has no c/o  Rue picc without swelling or pain  Tolerating doxycyline informed side effects of photosensitivity     Bilateral iliac angiogram, left femoral  angiography, nitroglycerin infusion left common femoral artery 1000 mcg  x2 boluses, right common femoral artery to left popliteal artery  catheterization, PTA of the left SFA and popliteal artery with 4 x 80  RapidCross balloon, 5 x 300 Saber balloon, 5 x 220 Powerflex balloon, 6  x 100 Powerflex balloon and 6 x 250 IN. PACT Admiral balloon, completion  left femoral angiography, ProGlide closure right common femoral artery  access site. 2/16 1. Excision and debridement of the left heel ulceration to and through  level of deep fascia and muscle. Total measurement is 4.5 cm x 6.0 cm x  0.5 cm in dimension. 2.  Incision and drainage, left foot abscess. 3.  Bone biopsy, left foot. 4.  Application of wound VAC negative pressure therapy.   cx Enterococcus  Path Bone biopsy left heel: Medullary bone, negative for osteomyelitis.     Most Recent   Organism   Date Value Ref Range Status   11/29/2021 Enterobacter cloacae complex (A)  Final   11/29/2021 Staphylococcus haemolyticus (A)  Final   11/29/2021 Corynebacterium species (A)  Final   Susceptibility     Enterobacter cloacae complex Staphylococcus haemolyticus    BACTERIAL SUSCEPTIBILITY PANEL BY ANNELIESE BACTERIAL SUSCEPTIBILITY PANEL BY ANNELIESE    aztreonam <=^1 mcg/mL Sensitive      ceFAZolin >=^64 mcg/mL Resistant      cefepime ^1 mcg/mL Sensitive      cefotaxime ^0.5 mcg/mL Sensitive      cefoTEtan <=^4 mcg/mL Resistant      cefOXitin >=^64 mcg/mL Resistant      cefpodoxime proxetil ^2 mcg/mL Sensitive      Ceftolozane/Tazobactam (Zerbaxa) <=^0.25 mcg/mL Sensitive      cefuroxime ^32 mcg/mL Resistant      cephalothin >=^64 mcg/mL Resistant      clindamycin   <=^0.12 mcg/mL Sensitive    DAPTOmycin   ^0.5 mcg/mL Sensitive    doripenem <=^0.12 mcg/mL Sensitive      doxycycline   >=^16 mcg/mL Resistant    erythromycin   >=^8 mcg/mL Resistant    gentamicin >=^16 mcg/mL Resistant ^4 mcg/mL Sensitive    levofloxacin >=^8 mcg/mL Resistant      meropenem <=^0.25 mcg/mL Sensitive oxacillin   >=^4 mcg/mL Resistant    piperacillin-tazobactam ^16 mcg/mL Sensitive      tobramycin >=^16 mcg/mL Resistant      trimethoprim-sulfamethoxazole ^40 mcg/mL Sensitive >=^320 mcg/mL Resistant    vancomycin   ^1 mcg/mL Sensitive                PAST MEDICAL HISTORY      Diagnosis Date    [facility]erebral artery occlusion with [facility] infarction [facility]     Diabetes mellitus ([facility]     Type 2    Hemiparesis affecting left side as late effect of cerebrovascular accident ([facility]     LEFT SIDE NON DOMINANT FOLLOWING STROKE    Hypertension      Past Surgical History:   Procedure Laterality Date    FINGER AMPUTATION      FOOT DEBRIDEMENT Left 2/16/2021    LEFT FOOT DEBRIDEMENT WITH BONE BIOPSY, WOUND VAC APPLICATION performed by Brett Pepe DPM at [place] [address] ARTHROSCOPY      TONSILLECTOMY      TRANSESOPHAGEAL ECHOCARDIOGRAM N/A 2/22/2021    TRANSESOPHAGEAL ECHOCARDIOGRAM WITH BUBBLE STUDY performed by Brice Phillip MD at [address] [facility] N/A 1/4/2021    EGD BIOPSY performed by Darlene Morrison DO at [place] [address] reviewed. No pertinent family history. Social History     Tobacco Use    Smoking status: Former Smoker    Smokeless tobacco: Never Used   Vaping Use    Vaping Use: Never used   Substance Use Topics    Alcohol use: Not Currently     Comment: Former every day drinker for most of adult life    Drug use: No     Allergies   Allergen Reactions    Pcn [Penicillins]      Current Outpatient Medications on File Prior to Encounter   Medication Sig Dispense Refill    diphenhydrAMINE (BENADRYL ALLERGY) 25 MG capsule Take 25 mg by mouth every 6 hours as needed for Itching      gabapentin (NEURONTIN) 100 MG capsule Take 100 mg by mouth 3 times daily.        doxycycline hyclate (VIBRAMYCIN) 100 MG capsule Take 100 mg by mouth 2 times daily      B Complex-C-E-Zn (STRESS B/ZINC) TABS Take 1 tablet by mouth daily      bisacodyl (DULCOLAX) 10 MG suppository Place 10 mg rectally daily      ferrous sulfate (IRON 325) 325 (65 Fe) MG tablet Take 325 mg by mouth daily (with breakfast)      magnesium hydroxide (MILK OF MAGNESIA CONCENTRATE) 2400 MG/10ML SUSP Take 30 mLs by mouth daily as needed      vitamin D (ERGOCALCIFEROL) 1.25 MG (71178 UT) CAPS capsule Take 1 capsule by mouth once a week 5 capsule 0    pantoprazole (PROTONIX) 40 MG tablet Take 1 tablet by mouth every morning (before breakfast) 30 tablet 3    melatonin 3 MG TABS tablet Take 9 mg by mouth nightly as needed      aspirin 81 MG EC tablet Take 81 mg by mouth daily      hydroCHLOROthiazide (HYDRODIURIL) 25 MG tablet Take 25 mg by mouth daily      glimepiride (AMARYL) 2 MG tablet Take 1 tablet by mouth daily (with breakfast) 30 tablet 3    metFORMIN (GLUCOPHAGE) 500 MG tablet Take 1 tablet by mouth 2 times daily (with meals) 60 tablet 3    atorvastatin (LIPITOR) 40 MG tablet Take 1 tablet by mouth nightly 30 tablet 3    clopidogrel (PLAVIX) 75 MG tablet Take 1 tablet by mouth daily 30 tablet 3    acetaminophen (TYLENOL) 325 MG tablet Take 650 mg by mouth every 6 hours as needed for Pain      glucose (GLUTOSE) 40 % GEL Take 37.5 mLs by mouth as needed (hypoglycemia) 45 g 1    glucagon, rDNA, 1 MG injection Inject 1 mg into the muscle as needed for Low blood sugar (Blood glucose less than 70 mg/dL and patient NOT ALERT or NPO and does not have IV access. ) 1 each 0    ipratropium-albuterol (DUONEB) 0.5-2.5 (3) MG/3ML SOLN nebulizer solution Inhale 3 mLs into the lungs every 4 hours 360 mL 0     No current facility-administered medications on file prior to encounter.      REVIEW OF SYSTEMS See HPI  Objective:    BP (!) 128/58   Pulse 62   Temp 97 °F (36.1 °C) (Temporal)   Resp 16   Ht 5' 11\" (1.803 m)   Wt 207 lb (93.9 kg)   BMI 28.87 kg/m²   Wt Readings from Last 3 Encounters:   12/06/21 207 lb (93.9 kg)   12/02/21 207 lb (93.9 kg)   11/29/21 207 lb (93.9 kg)     PHYSICAL EXAM  CONSTITUTIONAL: Awake, alert,   HEENT: AT/NC glasses  HEART: S1/S2  RS: CTAB ant  ABD: SOFT NT/ND    EXT:  Trace EDEMA, left 2nd finger amp  SKIN: Open wound Present post heel wound bed  As below  Psych pleasant   Wound Care Documentation:  Wound 12/26/20 Arm Left (Active)   Number of days: 305       Wound 02/15/21 Heel Left (Active)   Number of days: 255       Wound 02/15/21 Heel Right (Active)   Number of days: 255       Wound 03/08/21 Heel Left #1 left heel aquired 12/1/20 (Active)   Wound Image   10/04/21 1456   Dressing Status New dressing applied 10/25/21 1609   Wound Cleansed Cleansed with saline 10/25/21 1609   Dressing/Treatment Alginate;ABD;Dry dressing;Roll gauze 10/25/21 1609   Offloading for Diabetic Foot Ulcers Post op shoe 10/25/21 1609   Wound Length (cm) 4.4 cm 10/25/21 1551   Wound Width (cm) 2.8 cm 10/25/21 1551   Wound Depth (cm) 0.7 cm 10/25/21 1551   Wound Surface Area (cm^2) 12.32 cm^2 10/25/21 1551   Change in Wound Size % (l*w) -1.9 10/25/21 1551   Wound Volume (cm^3) 8.624 cm^3 10/25/21 1551   Wound Healing % -2 10/25/21 1551   Post-Procedure Length (cm) 4.4 cm 10/25/21 1609   Post-Procedure Width (cm) 2.9 cm 10/25/21 1609   Post-Procedure Depth (cm) 0.8 cm 10/25/21 1609   Post-Procedure Surface Area (cm^2) 12.76 cm^2 10/25/21 1609   Post-Procedure Volume (cm^3) 10.208 cm^3 10/25/21 1609   Undermining Starts ___ O'Clock 6 07/19/21 1054   Undermining Ends___ O'Clock 3 07/19/21 1054   Undermining Maxium Distance (cm) 0 10/04/21 1456   Wound Assessment Pale granulation tissue;Fibrin 10/25/21 1551   Drainage Amount Large 10/25/21 1551   Drainage Description Yellow 10/25/21 1551   Odor None 10/25/21 1551   Adrianne-wound Assessment Maceration 10/25/21 1551   Number of days: 234       Wound 03/08/21 Heel Right #2 rt heel aquired 12/1/20 (Active)   Wound Image   04/05/21 0929   Dressing Status New dressing applied 03/18/21 1352   Wound Cleansed Cleansed with saline 04/01/21 1536   Dressing/Treatment Alginate;Collagen;Silicone border 28/78/21 1536   Offloading for Diabetic Foot Ulcers Post op shoe 04/01/21 1536   Wound Length (cm) 0 cm 04/05/21 0929   Wound Width (cm) 0 cm 04/05/21 0929   Wound Depth (cm) 0 cm 04/05/21 0929   Wound Surface Area (cm^2) 0 cm^2 04/05/21 0929   Change in Wound Size % (l*w) 100 04/05/21 0929   Wound Volume (cm^3) 0 cm^3 04/05/21 0929   Wound Healing % 100 04/05/21 0929   Post-Procedure Length (cm) 0 cm 04/05/21 1011   Post-Procedure Width (cm) 0 cm 04/05/21 1011   Post-Procedure Depth (cm) 0 cm 04/05/21 1011   Post-Procedure Surface Area (cm^2) 0 cm^2 04/05/21 1011   Post-Procedure Volume (cm^3) 0 cm^3 04/05/21 1011   Wound Assessment Fibrin 04/01/21 1357   Drainage Amount None 04/01/21 1357   Drainage Description Yellow 03/22/21 0911   Odor None 04/01/21 1357   Adrianne-wound Assessment Maceration 04/01/21 1357   Number of days: 234       Assessment:   S/p rx MSSA bacteremia   BILATERAL HEEL PRESSURE ULCERS   NECROTIC WOUND, OSTEOMYELITIS, Cellulitis left foot with palpable bone   S/p LEFT FOOT DEBRIDEMENT WITH BONE BIOPSY, WOUND VAC APPLICATION  Bone biopsy left heel: Medullary bone, negative for osteomyelitis. S/p angio    UTI  - proteus s/p rx with cefepime  H/o vitamin D deficiency on suppleemnts    2/14 wound cx Mixed Gram positive organisms   Proteus species   TTE Summary No vegetations seen otherwise. Plan:   Cont  Doxycyline  omnicef added   He completed cipro   can f/u 4-6 weeks    CAN DO CBC/CMP/ESR/CRP EVERY 2 WEEKS     WOUND CARE GENT CREAM/ALGINATE ABD KERLEX  allergic to pcn   F/u 4 weeks     No orders of the defined types were placed in this encounter.     Orders Placed This Encounter   Medications    cefdinir (OMNICEF) 300 MG capsule     Sig: Take 1 capsule by mouth 2 times daily     Dispense:  60 capsule     Refill:  0       Plan:   Treatment Note please see attached Discharge Instructions    Written patient dismissal instructions given to patient and signed by patient or POA. Discharge Instructions       Discharge condition: Stable     Assessment of pain at discharge: moderate     Anesthetic used: 4% liquid lidocaine solution      Discharge to: ECF     Left via:ambulance     Accompanied by: self     [facility]: [facility]     Dressing Orders:  Cleanse left heel ulcer with normal saline solution apply first gentamicin 0.1% ointment and cover with plain alginate and dry dressing and change every other day or as needed for drainage.  .      Treatment Orders:    Continue antibiotics per Dr Yaz German and Cipro for suppression.  Wear prevalon boots or heel ayo while in bed.          May be partial weight bearing up to 50% on left with surgical shoe.      for [facility] evaluation - pt states leaving facility in 1 month  - will send consult to [facility]       Consider surgical debridement of infected bone in heel with skin graft.     Culture taken reviewed 12/2/21     [facility] followup visit: ______1 month dr perales_________________ 1 weeks Dr Harrell____________________________  (Please note your next appointment above and if you are unable to keep, kindly give a 24 hour notice.  Thank you.)     Physician signature:__________________________        If you experience any of the following, please call the [address] [place] [address] during business hours:     * Increase in Pain  * Temperature over 101  * Increase in drainage from your wound  * Drainage with a foul odor  * Bleeding  * Increase in swelling  * Need for compression bandage changes due to slippage, breakthrough drainage.     If you need medical attention outside of the business hours of the [address] [place] [address] please contact your PCP or go to the nearest emergency room.                                                                                                                    Electronically signed by Henderson Gilford, MD on 12/7/2021 at 8:16 AM

## 2021-12-06 ENCOUNTER — HOSPITAL ENCOUNTER (OUTPATIENT)
Dept: WOUND CARE | Age: 61
Discharge: HOME | End: 2021-12-06
Payer: COMMERCIAL

## 2021-12-06 VITALS
TEMPERATURE: 97.2 F | WEIGHT: 207 LBS | BODY MASS INDEX: 28.98 KG/M2 | HEART RATE: 106 BPM | HEIGHT: 71 IN | SYSTOLIC BLOOD PRESSURE: 114 MMHG | RESPIRATION RATE: 16 BRPM | DIASTOLIC BLOOD PRESSURE: 72 MMHG

## 2021-12-06 DIAGNOSIS — E11.628 DIABETIC FOOT INFECTION (HCC): Primary | ICD-10-CM

## 2021-12-06 DIAGNOSIS — L08.9 DIABETIC FOOT INFECTION (HCC): Primary | ICD-10-CM

## 2021-12-06 PROCEDURE — 11042 DBRDMT SUBQ TIS 1ST 20SQCM/<: CPT

## 2021-12-06 PROCEDURE — 6370000000 HC RX 637 (ALT 250 FOR IP): Performed by: PODIATRIST

## 2021-12-06 RX ORDER — LIDOCAINE 50 MG/G
OINTMENT TOPICAL ONCE
Status: CANCELLED | OUTPATIENT
Start: 2021-12-06 | End: 2021-12-06

## 2021-12-06 RX ORDER — LIDOCAINE HYDROCHLORIDE 40 MG/ML
SOLUTION TOPICAL ONCE
Status: CANCELLED | OUTPATIENT
Start: 2021-12-06 | End: 2021-12-06

## 2021-12-06 RX ORDER — GENTAMICIN SULFATE 1 MG/G
OINTMENT TOPICAL ONCE
Status: CANCELLED | OUTPATIENT
Start: 2021-12-06 | End: 2021-12-06

## 2021-12-06 RX ORDER — BACITRACIN ZINC AND POLYMYXIN B SULFATE 500; 1000 [USP'U]/G; [USP'U]/G
OINTMENT TOPICAL ONCE
Status: CANCELLED | OUTPATIENT
Start: 2021-12-06 | End: 2021-12-06

## 2021-12-06 RX ORDER — LIDOCAINE HYDROCHLORIDE 20 MG/ML
JELLY TOPICAL ONCE
Status: CANCELLED | OUTPATIENT
Start: 2021-12-06 | End: 2021-12-06

## 2021-12-06 RX ORDER — BACITRACIN, NEOMYCIN, POLYMYXIN B 400; 3.5; 5 [USP'U]/G; MG/G; [USP'U]/G
OINTMENT TOPICAL ONCE
Status: CANCELLED | OUTPATIENT
Start: 2021-12-06 | End: 2021-12-06

## 2021-12-06 RX ORDER — LIDOCAINE 40 MG/G
CREAM TOPICAL ONCE
Status: CANCELLED | OUTPATIENT
Start: 2021-12-06 | End: 2021-12-06

## 2021-12-06 RX ORDER — CLOBETASOL PROPIONATE 0.5 MG/G
OINTMENT TOPICAL ONCE
Status: CANCELLED | OUTPATIENT
Start: 2021-12-06 | End: 2021-12-06

## 2021-12-06 RX ORDER — GINSENG 100 MG
CAPSULE ORAL ONCE
Status: CANCELLED | OUTPATIENT
Start: 2021-12-06 | End: 2021-12-06

## 2021-12-06 RX ORDER — BETAMETHASONE DIPROPIONATE 0.05 %
OINTMENT (GRAM) TOPICAL ONCE
Status: CANCELLED | OUTPATIENT
Start: 2021-12-06 | End: 2021-12-06

## 2021-12-06 RX ORDER — LIDOCAINE HYDROCHLORIDE 40 MG/ML
SOLUTION TOPICAL ONCE
Status: COMPLETED | OUTPATIENT
Start: 2021-12-06 | End: 2021-12-06

## 2021-12-06 RX ADMIN — LIDOCAINE HYDROCHLORIDE 5 ML: 40 SOLUTION TOPICAL at 15:35

## 2021-12-06 ASSESSMENT — PAIN - FUNCTIONAL ASSESSMENT: PAIN_FUNCTIONAL_ASSESSMENT: ACTIVITIES ARE NOT PREVENTED

## 2021-12-06 ASSESSMENT — PAIN DESCRIPTION - ORIENTATION: ORIENTATION: LEFT

## 2021-12-06 ASSESSMENT — PAIN DESCRIPTION - PROGRESSION: CLINICAL_PROGRESSION: NOT CHANGED

## 2021-12-06 ASSESSMENT — PAIN DESCRIPTION - PAIN TYPE: TYPE: CHRONIC PAIN

## 2021-12-06 ASSESSMENT — PAIN DESCRIPTION - FREQUENCY: FREQUENCY: CONTINUOUS

## 2021-12-06 ASSESSMENT — PAIN SCALES - GENERAL: PAINLEVEL_OUTOF10: 0

## 2021-12-06 ASSESSMENT — PAIN DESCRIPTION - LOCATION: LOCATION: FOOT

## 2021-12-06 ASSESSMENT — PAIN DESCRIPTION - ONSET: ONSET: ON-GOING

## 2021-12-06 ASSESSMENT — PAIN DESCRIPTION - DESCRIPTORS: DESCRIPTORS: DULL

## 2021-12-06 NOTE — PROGRESS NOTES
[facility]aling Center  History and Physical/Consultation  Podiatry    Referring Physician : Liza Li MD  [id number] RECORD NUMBER:  [id number]  AGE: 57 y.o. GENDER: male  : 1960  EPISODE DATE:  2021  Subjective:     Chief Complaint   Patient presents with    Wound Check     left foot         HISTORY of PRESENT ILLNESS HPI     Wyatt Raeves is a 57 y.o. male who presents today for wound/ulcer evaluation. History of Wound Context:  The patient has had a wound of bilateral heels which was first noted approximately over a month ago. This has been treated with surgical debridement. On their initial visit to the wound healing center, 21 ,  the patient has noted that the wound has not been improving. The patient has had similar previous wounds in the past.      Pt is not on abx at time of initial visit. 3/22/21 - Wound VAC MWF continue to left heel. Aquacel Ag to right heel. Debrided toenails 1-5 in thickness and length. FU 1 week. IV Abx per Dr. Xiomara Landers. 21 - Wound VAC MWF to left heel. Cultures obtained. FU 1 week after visit with Dr. Xiomara Landers. PO Abx.  21 - DC wound VAC to left heel. Alginate to wound. FU 1 week  21 - Alginate and gentamicin ointment QOD. Partial WB to heel at 50% to foot with surgical shoe and walker. 5/3/21 - Alginate and gentamicin ointment QOD. MRI left heel    5/10/21 - Alginate and gentamicin ointment QOD. MRI left heel pending. 21 - Alginate and gentamicin ointment QOD. MRI reviewed confirming OM. Discussed HBOT referral and partial calcanectomy. He opts for HBOT consultation. 21 - Alginate and add gentamicin ointment QD. HBOT referral.  Patient hold off on partial calcanectomy. FU 1 week    21 - Alginate and add gentamicin ointment QD. HBOT referral.  Patient hold off on partial calcanectomy. FU 1 week     21 - Alginate and add gentamicin ointment QD.   HBOT referral.  Patient hold off on partial calcanectomy. FU 1 week. Patient gave me consent (verbal) to speak with his brother Kellie Stone regarding his at home arrangement. HBOT is on hold until patient is at home. 7/12/21 -  Alginate and add gentamicin ointment QD. HBOT referral.  Patient considering partial calcanectomy with flap. FU 1 week. 7/19/21 - Alginate and add gentamicin ointment QD. HBOT referral.  Patient considering partial calcanectomy with flap. FU 1 week. 8/2/21 - Alginate and add gentamicin ointment QD. HBOT referral.  Patient considering partial calcanectomy with flap. FU 1 week. 8/16/21 - Alginate and add gentamicin ointment QD. HBOT referral.  Patient considering partial calcanectomy with flap. FU 1 week. 8/23/21 - Alginate and add gentamicin ointment QD. HBOT referral.  Patient considering partial calcanectomy with flap. FU 1 week. 8/30/21 - Alginate and add gentamicin ointment QD. HBOT referral.  Patient considering partial calcanectomy with flap. FU 1 week. 9/20/21 - Alginate and add gentamicin ointment QD. HBOT referral.  Patient considering partial calcanectomy with flap. FU 1 week. 10/25/21 Alginate and add gentamicin ointment QD. HBOT referral.  Patient considering partial calcanectomy with flap. FU 1 week. 11/1/21 Alginate and add gentamicin ointment QD. HBOT referral.  Patient considering partial calcanectomy with flap. FU 1 week. 11/8/21 - Alginate and add gentamicin ointment QD. HBOT referral.  Patient considering partial calcanectomy with flap. FU 1 week. 11/15/21 - Alginate and add gentamicin ointment QD. HBOT referral.  Patient considering partial calcanectomy with flap. FU 1 week. 11/22/21 -  Alginate and add gentamicin ointment QD. HBOT referral.  Patient considering partial calcanectomy with flap. FU 1 week. 11/29/21 0 Alginate and gentamicin ointment. Possible DC home.   HBOT referral.  Patient considering partial calcanectomy with flap.  FU 1 week. 12/6/21 - Alginate and gentamicin ointment. Possible DC home. HBOT referral.  Patient considering partial calcanectomy with flap. FU 1 week. Wound/Ulcer Pain Timing/Severity: none  Quality of pain: N/A  Severity:  0 / 10   Modifying Factors: None  Associated Signs/Symptoms: none    Ulcer Identification:  Ulcer Type: arterial and diabetic  Contributing Factors: diabetes    Diabetic/Pressure/Non Pressure Ulcers onl y:  Ulcer: Diabetic ulcer, fat layer exposed    If patient has diabetic lower extremity wounds  Kong Classification of diabetic lower extremity wounds:    Grade Description   []  0 No open wound   []  1 Superficial ulcer involving the full skin thickness   [x]  2 Deep ulcer involves ligament, tendon, joint capsule, or fascia  No bone involvement or abscess presence   []  3 Deep Ulcer with abcess formation and/or osteomyelitis   []  4 Localized gangrene   []  5 Extensive gangrene of the foot     Wound: N/A        PAST MEDICAL HISTORY      Diagnosis Date    [facility]erebral [facility] occlusion [facility] ([facility]     Diabetes mellitus ([facility]     Type 2    Hemiparesis affecting left side as late effect of cerebrovascular accident ([facility]     LEFT SIDE NON DOMINANT FOLLOWING STROKE    Hypertension      Past Surgical History:   Procedure Laterality Date    FINGER AMPUTATION      FOOT DEBRIDEMENT Left 2/16/2021    LEFT FOOT DEBRIDEMENT WITH BONE BIOPSY, WOUND VAC APPLICATION performed by Tanya Higgins DPM at [place] [address] ARTHROSCOPY      TONSILLECTOMY      TRANSESOPHAGEAL ECHOCARDIOGRAM N/A 2/22/2021    TRANSESOPHAGEAL ECHOCARDIOGRAM WITH BUBBLE STUDY performed by Carol Ann Kurtz MD at [facility] N/A 1/4/2021    EGD BIOPSY performed by rBuce Escobar DO at [place] [address]     History reviewed. No pertinent family history.   Social History     Tobacco Use    Smoking status: Former Smoker    Smokeless tobacco: Never Used   Vaping Use    Vaping Use: Never used   Substance Use Topics    Alcohol use: Not Currently     Comment: Former every day drinker for most of adult life    Drug use: No     Allergies   Allergen Reactions    Pcn [Penicillins]      Current Outpatient Medications on File Prior to Encounter   Medication Sig Dispense Refill    cefdinir (OMNICEF) 300 MG capsule Take 1 capsule by mouth 2 times daily 60 capsule 0    gabapentin (NEURONTIN) 100 MG capsule Take 100 mg by mouth 3 times daily.        doxycycline hyclate (VIBRAMYCIN) 100 MG capsule Take 100 mg by mouth 2 times daily      B Complex-C-E-Zn (STRESS B/ZINC) TABS Take 1 tablet by mouth daily      bisacodyl (DULCOLAX) 10 MG suppository Place 10 mg rectally daily      ferrous sulfate (IRON 325) 325 (65 Fe) MG tablet Take 325 mg by mouth daily (with breakfast)      vitamin D (ERGOCALCIFEROL) 1.25 MG (62536 UT) CAPS capsule Take 1 capsule by mouth once a week 5 capsule 0    pantoprazole (PROTONIX) 40 MG tablet Take 1 tablet by mouth every morning (before breakfast) 30 tablet 3    melatonin 3 MG TABS tablet Take 9 mg by mouth nightly as needed      aspirin 81 MG EC tablet Take 81 mg by mouth daily      hydroCHLOROthiazide (HYDRODIURIL) 25 MG tablet Take 25 mg by mouth daily      glimepiride (AMARYL) 2 MG tablet Take 1 tablet by mouth daily (with breakfast) 30 tablet 3    metFORMIN (GLUCOPHAGE) 500 MG tablet Take 1 tablet by mouth 2 times daily (with meals) 60 tablet 3    atorvastatin (LIPITOR) 40 MG tablet Take 1 tablet by mouth nightly 30 tablet 3    clopidogrel (PLAVIX) 75 MG tablet Take 1 tablet by mouth daily 30 tablet 3    diphenhydrAMINE (BENADRYL ALLERGY) 25 MG capsule Take 25 mg by mouth every 6 hours as needed for Itching      acetaminophen (TYLENOL) 325 MG tablet Take 650 mg by mouth every 6 hours as needed for Pain      magnesium hydroxide (MILK OF MAGNESIA CONCENTRATE) 2400 MG/10ML SUSP Take 30 mLs by mouth daily as needed      glucose (GLUTOSE) 40 Wound 12/26/20 Arm Left (Active)   Number of days: 344       Wound 02/15/21 Heel Left (Active)   Number of days: 294       Wound 02/15/21 Heel Right (Active)   Number of days: 294       Wound 03/08/21 Heel Left #1 left heel aquired 12/1/20 (Active)   Wound Image   11/29/21 1504   Dressing Status New dressing applied; Clean; Dry; Intact 12/06/21 1601   Wound Cleansed Cleansed with saline 12/06/21 1601   Dressing/Treatment Alginate; ABD; Dry dressing 12/06/21 1601   Offloading for Diabetic Foot Ulcers Post op shoe 12/06/21 1601   Wound Length (cm) 4.3 cm 12/06/21 1529   Wound Width (cm) 2.7 cm 12/06/21 1529   Wound Depth (cm) 0.6 cm 12/06/21 1529   Wound Surface Area (cm^2) 11.61 cm^2 12/06/21 1529   Change in Wound Size % (l*w) 3.97 12/06/21 1529   Wound Volume (cm^3) 6.966 cm^3 12/06/21 1529   Wound Healing % 18 12/06/21 1529   Post-Procedure Length (cm) 4.4 cm 12/06/21 1546   Post-Procedure Width (cm) 2.8 cm 12/06/21 1546   Post-Procedure Depth (cm) 0.7 cm 12/06/21 1546   Post-Procedure Surface Area (cm^2) 12.32 cm^2 12/06/21 1546   Post-Procedure Volume (cm^3) 8.624 cm^3 12/06/21 1546   Undermining Starts ___ O'Clock 6 07/19/21 1054   Undermining Ends___ O'Clock 3 07/19/21 1054   Undermining Maxium Distance (cm) 0 10/04/21 1456   Wound Assessment Fibrin; Slough; Granulation tissue 12/06/21 1529   Drainage Amount Moderate 12/06/21 1529   Drainage Description Green; Yellow 12/06/21 1529   Odor None 12/06/21 1529   Adrianne-wound Assessment Maceration 12/06/21 1529   Number of days: 273       Wound 03/08/21 Heel Right #2 rt heel aquired 12/1/20 (Active)   Wound Image   04/05/21 0929   Dressing Status New dressing applied 03/18/21 1352   Wound Cleansed Cleansed with saline 04/01/21 1536   Dressing/Treatment Alginate; Collagen; Silicone border 83/17/66 1536   Offloading for Diabetic Foot Ulcers Post op shoe 04/01/21 1536   Wound Length (cm) 0 cm 04/05/21 0929   Wound Width (cm) 0 cm 04/05/21 0929   Wound Depth (cm) 0 cm 04/05/21 0929   Wound Surface Area (cm^2) 0 cm^2 04/05/21 0929   Change in Wound Size % (l*w) 100 04/05/21 0929   Wound Volume (cm^3) 0 cm^3 04/05/21 0929   Wound Healing % 100 04/05/21 0929   Post-Procedure Length (cm) 0 cm 04/05/21 1011   Post-Procedure Width (cm) 0 cm 04/05/21 1011   Post-Procedure Depth (cm) 0 cm 04/05/21 1011   Post-Procedure Surface Area (cm^2) 0 cm^2 04/05/21 1011   Post-Procedure Volume (cm^3) 0 cm^3 04/05/21 1011   Wound Assessment Fibrin 04/01/21 1357   Drainage Amount None 04/01/21 1357   Drainage Description Yellow 03/22/21 0911   Odor None 04/01/21 1357   Adrianne-wound Assessment Maceration 04/01/21 1357   Number of days: 273     Incision 02/19/21 Groin Right (Active)   Number of days: 290       Procedure Note  Indications:  Based on my examination of this patient's wound(s)/ulcer(s) today, debridement is required to promote healing and evaluate the wound base. Performed by: Kwame Arriola DPM    Consent obtained:  Yes    Time out taken:  Yes    Pain Control: Anesthetic  Anesthetic: None     Debridement:Excisional Debridement    Using curette the wound(s)/ulcer(s) was/were sharply debrided down through and including the removal of subcutaneous tissue. Devitalized Tissue Debrided:  slough to stimulate bleeding to promote healing, post debridement good bleeding base and wound edges noted    Wound/Ulcer #: 2    Percent of Wound/Ulcer Debrided: 100%    Total Surface Area Debrided:  12.32 sq cm     Estimated Blood Loss:  None  Hemostasis Achieved:  by pressure    Procedural Pain:  2  / 10   Post Procedural Pain:  2 / 10     Response to treatment:  Well tolerated by patient. A culture was done.       Plan:     Pt is a smoker   - Discussed relationship of smoking and negative affects on wound healing   - Emphasized importance of tobacco avoidace/cessation   - Script for nicotine patch given to patient   - Information regarding support groups for smoking cessation given    In my professional opinion and based off the information that is available at this time this patient has appropriate indication for HBO Therapy: Maybe    Treatment Note please see attached Discharge Instructions    Written patient dismissal instructions given to patient and signed by patient or POA.            Electronically signed by Seema Farias DPM on 12/6/2021 at 4:15 PM

## 2021-12-13 ENCOUNTER — HOSPITAL ENCOUNTER (OUTPATIENT)
Dept: WOUND CARE | Age: 61
Discharge: HOME | End: 2021-12-13
Payer: COMMERCIAL

## 2021-12-13 VITALS
WEIGHT: 207 LBS | RESPIRATION RATE: 18 BRPM | TEMPERATURE: 97.8 F | HEART RATE: 76 BPM | SYSTOLIC BLOOD PRESSURE: 130 MMHG | DIASTOLIC BLOOD PRESSURE: 68 MMHG | BODY MASS INDEX: 28.87 KG/M2

## 2021-12-13 DIAGNOSIS — L08.9 DIABETIC FOOT INFECTION (HCC): Primary | ICD-10-CM

## 2021-12-13 DIAGNOSIS — E11.628 DIABETIC FOOT INFECTION (HCC): Primary | ICD-10-CM

## 2021-12-13 PROCEDURE — 6370000000 HC RX 637 (ALT 250 FOR IP): Performed by: PODIATRIST

## 2021-12-13 PROCEDURE — 11042 DBRDMT SUBQ TIS 1ST 20SQCM/<: CPT

## 2021-12-13 RX ORDER — BACITRACIN, NEOMYCIN, POLYMYXIN B 400; 3.5; 5 [USP'U]/G; MG/G; [USP'U]/G
OINTMENT TOPICAL ONCE
Status: CANCELLED | OUTPATIENT
Start: 2021-12-13 | End: 2021-12-13

## 2021-12-13 RX ORDER — GINSENG 100 MG
CAPSULE ORAL ONCE
Status: CANCELLED | OUTPATIENT
Start: 2021-12-13 | End: 2021-12-13

## 2021-12-13 RX ORDER — LIDOCAINE HYDROCHLORIDE 40 MG/ML
SOLUTION TOPICAL ONCE
Status: CANCELLED | OUTPATIENT
Start: 2021-12-13 | End: 2021-12-13

## 2021-12-13 RX ORDER — LIDOCAINE HYDROCHLORIDE 20 MG/ML
JELLY TOPICAL ONCE
Status: CANCELLED | OUTPATIENT
Start: 2021-12-13 | End: 2021-12-13

## 2021-12-13 RX ORDER — GENTAMICIN SULFATE 1 MG/G
OINTMENT TOPICAL ONCE
Status: CANCELLED | OUTPATIENT
Start: 2021-12-13 | End: 2021-12-13

## 2021-12-13 RX ORDER — LIDOCAINE HYDROCHLORIDE 40 MG/ML
SOLUTION TOPICAL ONCE
Status: COMPLETED | OUTPATIENT
Start: 2021-12-13 | End: 2021-12-13

## 2021-12-13 RX ORDER — BACITRACIN ZINC AND POLYMYXIN B SULFATE 500; 1000 [USP'U]/G; [USP'U]/G
OINTMENT TOPICAL ONCE
Status: CANCELLED | OUTPATIENT
Start: 2021-12-13 | End: 2021-12-13

## 2021-12-13 RX ORDER — CLOBETASOL PROPIONATE 0.5 MG/G
OINTMENT TOPICAL ONCE
Status: CANCELLED | OUTPATIENT
Start: 2021-12-13 | End: 2021-12-13

## 2021-12-13 RX ORDER — BETAMETHASONE DIPROPIONATE 0.05 %
OINTMENT (GRAM) TOPICAL ONCE
Status: CANCELLED | OUTPATIENT
Start: 2021-12-13 | End: 2021-12-13

## 2021-12-13 RX ORDER — LIDOCAINE 50 MG/G
OINTMENT TOPICAL ONCE
Status: CANCELLED | OUTPATIENT
Start: 2021-12-13 | End: 2021-12-13

## 2021-12-13 RX ORDER — LIDOCAINE 40 MG/G
CREAM TOPICAL ONCE
Status: CANCELLED | OUTPATIENT
Start: 2021-12-13 | End: 2021-12-13

## 2021-12-13 RX ADMIN — LIDOCAINE HYDROCHLORIDE 10 ML: 40 SOLUTION TOPICAL at 16:09

## 2021-12-13 ASSESSMENT — PAIN DESCRIPTION - PAIN TYPE: TYPE: CHRONIC PAIN

## 2021-12-13 ASSESSMENT — PAIN SCALES - GENERAL: PAINLEVEL_OUTOF10: 3

## 2021-12-13 ASSESSMENT — PAIN DESCRIPTION - LOCATION: LOCATION: FOOT

## 2021-12-13 ASSESSMENT — PAIN DESCRIPTION - DESCRIPTORS: DESCRIPTORS: DULL

## 2021-12-13 ASSESSMENT — PAIN DESCRIPTION - ORIENTATION: ORIENTATION: LEFT

## 2021-12-13 NOTE — PLAN OF CARE
Problem: Wound:  Goal: Will show signs of wound healing; wound closure and no evidence of infection  Description: Will show signs of wound healing; wound closure and no evidence of infection  12/13/2021 1534 by Rishabh Michelle RN  Outcome: Met This Shift  12/13/2021 1534 by Rishabh Michelle RN  Outcome: Met This Shift     Problem: Blood Glucose:  Goal: Ability to maintain appropriate glucose levels will improve  Description: Ability to maintain appropriate glucose levels will improve  12/13/2021 1534 by Rishabh Michelle RN  Outcome: Met This Shift  12/13/2021 1534 by Rishabh Michelle RN  Outcome: Met This Shift     Problem: Pain:  Goal: Pain level will decrease  Description: Pain level will decrease  12/13/2021 1534 by Rishabh Michelle RN  Outcome: Met This Shift  12/13/2021 1534 by Rishabh Michelle RN  Outcome: Met This Shift  Goal: Control of chronic pain  Description: Control of chronic pain  12/13/2021 1534 by Rishabh Michelle RN  Outcome: Met This Shift  12/13/2021 1534 by Rishabh Michelle RN  Outcome: Met This Shift     Problem: Arterial:  Goal: Optimize blood flow for wound healing  Description: Optimize blood flow for wound healing  Outcome: Met This Shift

## 2021-12-13 NOTE — PROGRESS NOTES
with flap. FU 1 week. 12/6/21 - Alginate and gentamicin ointment. Possible DC home. HBOT referral.  Patient considering partial calcanectomy with flap. FU 1 week. 12/13/21 - Alginate and gentamicin ointment. DC home. HBOT referral.  [place] [address] referral for wound care and PT for GAIT training and safe DME use. Patient considering partial calcanectomy with flap. FU 1 week.       Wound/Ulcer Pain Timing/Severity: none  Quality of pain: N/A  Severity:  0 / 10   Modifying Factors: None  Associated Signs/Symptoms: none    Ulcer Identification:  Ulcer Type: arterial and diabetic  Contributing Factors: diabetes    Diabetic/Pressure/Non Pressure Ulcers onl y:  Ulcer: Diabetic ulcer, fat layer exposed    If patient has diabetic lower extremity wounds  Kong Classification of diabetic lower extremity wounds:    Grade Description   []  0 No open wound   []  1 Superficial ulcer involving the full skin thickness   [x]  2 Deep ulcer involves ligament, tendon, joint capsule, or fascia  No bone involvement or abscess presence   []  3 Deep Ulcer with abcess formation and/or osteomyelitis   []  4 Localized gangrene   []  5 Extensive gangrene of the foot     Wound: N/A        PAST MEDICAL HISTORY      Diagnosis Date    Cerebral artery occlusion with cerebral infarction ([address]     Diabetes mellitus ([address]     Type 2    Hemiparesis affecting left side as late effect of cerebrovascular accident ([address]     LEFT SIDE NON DOMINANT FOLLOWING STROKE    Hypertension      Past Surgical History:   Procedure Laterality Date    FINGER AMPUTATION      FOOT DEBRIDEMENT Left 2/16/2021    LEFT FOOT DEBRIDEMENT WITH BONE BIOPSY, WOUND VAC APPLICATION performed by Delaney Gabriel DPM at [place] [address] ARTHROSCOPY      TONSILLECTOMY      TRANSESOPHAGEAL ECHOCARDIOGRAM N/A 2/22/2021    TRANSESOPHAGEAL ECHOCARDIOGRAM WITH BUBBLE STUDY performed by Hyacinth Gordon MD at [address] [place] [address],[address] N/A 1/4/2021    JOSE BIOPSY performed by Tre Quiñonez DO at [facility] reviewed. No pertinent family history. Social History     Tobacco Use    Smoking status: Former Smoker    Smokeless tobacco: Never Used   Vaping Use    Vaping Use: Never used   Substance Use Topics    Alcohol use: Not Currently     Comment: Former every day drinker for most of adult life    Drug use: No     Allergies   Allergen Reactions    Pcn [Penicillins]      Current Outpatient Medications on File Prior to Encounter   Medication Sig Dispense Refill    cefdinir (OMNICEF) 300 MG capsule Take 1 capsule by mouth 2 times daily 60 capsule 0    diphenhydrAMINE (BENADRYL ALLERGY) 25 MG capsule Take 25 mg by mouth every 6 hours as needed for Itching      gabapentin (NEURONTIN) 100 MG capsule Take 100 mg by mouth 3 times daily.        doxycycline hyclate (VIBRAMYCIN) 100 MG capsule Take 100 mg by mouth 2 times daily      B Complex-C-E-Zn (STRESS B/ZINC) TABS Take 1 tablet by mouth daily      acetaminophen (TYLENOL) 325 MG tablet Take 650 mg by mouth every 6 hours as needed for Pain      ferrous sulfate (IRON 325) 325 (65 Fe) MG tablet Take 325 mg by mouth daily (with breakfast)      magnesium hydroxide (MILK OF MAGNESIA CONCENTRATE) 2400 MG/10ML SUSP Take 30 mLs by mouth daily as needed      vitamin D (ERGOCALCIFEROL) 1.25 MG (93147 UT) CAPS capsule Take 1 capsule by mouth once a week 5 capsule 0    ipratropium-albuterol (DUONEB) 0.5-2.5 (3) MG/3ML SOLN nebulizer solution Inhale 3 mLs into the lungs every 4 hours 360 mL 0    pantoprazole (PROTONIX) 40 MG tablet Take 1 tablet by mouth every morning (before breakfast) 30 tablet 3    melatonin 3 MG TABS tablet Take 9 mg by mouth nightly as needed      aspirin 81 MG EC tablet Take 81 mg by mouth daily      hydroCHLOROthiazide (HYDRODIURIL) 25 MG tablet Take 25 mg by mouth daily      glimepiride (AMARYL) 2 MG tablet Take 1 tablet by mouth daily (with breakfast) 30 tablet 3    metFORMIN Pre Debridement Measurements:  Are located in the [facility]  Documentation Flow Sheet  Post Debridement Measurements:  Wound/Ulcer Descriptions are Pre Debridement except measurements:     Wound 12/26/20 Arm Left (Active)   Number of days: 351       Wound 02/15/21 Heel Left (Active)   Number of days: 301       Wound 02/15/21 Heel Right (Active)   Number of days: 301       Wound 03/08/21 Heel Left #1 left heel aquired 12/1/20 (Active)   Wound Image   11/29/21 1504   Dressing Status New dressing applied; Clean; Dry; Intact 12/06/21 1601   Wound Cleansed Cleansed with saline 12/06/21 1601   Dressing/Treatment Alginate; ABD; Dry dressing 12/06/21 1601   Offloading for Diabetic Foot Ulcers Post op shoe 12/06/21 1601   Wound Length (cm) 4.6 cm 12/13/21 1605   Wound Width (cm) 2.9 cm 12/13/21 1605   Wound Depth (cm) 0.4 cm 12/13/21 1605   Wound Surface Area (cm^2) 13.34 cm^2 12/13/21 1605   Change in Wound Size % (l*w) -10.34 12/13/21 1605   Wound Volume (cm^3) 5.336 cm^3 12/13/21 1605   Wound Healing % 37 12/13/21 1605   Post-Procedure Length (cm) 4.6 cm 12/13/21 1626   Post-Procedure Width (cm) 3 cm 12/13/21 1626   Post-Procedure Depth (cm) 0.5 cm 12/13/21 1626   Post-Procedure Surface Area (cm^2) 13.8 cm^2 12/13/21 1626   Post-Procedure Volume (cm^3) 6.9 cm^3 12/13/21 1626   Undermining Starts ___ O'Clock 6 07/19/21 1054   Undermining Ends___ O'Clock 3 07/19/21 1054   Undermining Maxium Distance (cm) 0 10/04/21 1456   Wound Assessment Fibrin; Pale granulation tissue; [facility] 12/13/21 1605   Drainage Amount Large 12/13/21 1605   Drainage Description Yellow 12/13/21 1605   Odor None 12/13/21 1605   Adrianne-wound Assessment Maceration 12/13/21 1605   Number of days: 280       Wound 03/08/21 Heel Right #2 rt heel aquired 12/1/20 (Active)   Wound Image   04/05/21 0929   Dressing Status New dressing applied 03/18/21 1352   Wound Cleansed Cleansed with saline 04/01/21 1536   Dressing/Treatment Alginate; Collagen; Silicone border 04/01/21 1536   Offloading for Diabetic Foot Ulcers Post op shoe 04/01/21 1536   Wound Length (cm) 0 cm 04/05/21 0929   Wound Width (cm) 0 cm 04/05/21 0929   Wound Depth (cm) 0 cm 04/05/21 0929   Wound Surface Area (cm^2) 0 cm^2 04/05/21 0929   Change in Wound Size % (l*w) 100 04/05/21 0929   Wound Volume (cm^3) 0 cm^3 04/05/21 0929   Wound Healing % 100 04/05/21 0929   Post-Procedure Length (cm) 0 cm 04/05/21 1011   Post-Procedure Width (cm) 0 cm 04/05/21 1011   Post-Procedure Depth (cm) 0 cm 04/05/21 1011   Post-Procedure Surface Area (cm^2) 0 cm^2 04/05/21 1011   Post-Procedure Volume (cm^3) 0 cm^3 04/05/21 1011   Wound Assessment Fibrin 04/01/21 1357   Drainage Amount None 04/01/21 1357   Drainage Description Yellow 03/22/21 0911   Odor None 04/01/21 1357   Adrianne-wound Assessment Maceration 04/01/21 1357   Number of days: 280     Incision 02/19/21 Groin Right (Active)   Number of days: 297       Procedure Note  Indications:  Based on my examination of this patient's wound(s)/ulcer(s) today, debridement is required to promote healing and evaluate the wound base. Performed by: Vanessa Noguera DPM    Consent obtained:  Yes    Time out taken:  Yes    Pain Control: Anesthetic  Anesthetic: 4% Lidocaine Liquid Topical     Debridement:Excisional Debridement    Using curette the wound(s)/ulcer(s) was/were sharply debrided down through and including the removal of subcutaneous tissue. Devitalized Tissue Debrided:  slough to stimulate bleeding to promote healing, post debridement good bleeding base and wound edges noted    Wound/Ulcer #: 2    Percent of Wound/Ulcer Debrided: 100%    Total Surface Area Debrided:  13.8 sq cm     Estimated Blood Loss:  None  Hemostasis Achieved:  by pressure    Procedural Pain:  2  / 10   Post Procedural Pain:  2 / 10     Response to treatment:  Well tolerated by patient. A culture was done.       Plan:     Pt is a smoker   - Discussed relationship of smoking and negative affects on wound healing   - Emphasized importance of tobacco avoidace/cessation   - Script for nicotine patch given to patient   - Information regarding support groups for smoking cessation given    In my professional opinion and based off the information that is available at this time this patient has appropriate indication for HBO Therapy: Maybe    Treatment Note please see attached Discharge Instructions    Written patient dismissal instructions given to patient and signed by patient or POA.            Electronically signed by Zenon Ortiz DPM on 12/13/2021 at 4:35 PM

## 2021-12-14 NOTE — PROGRESS NOTES
Spoke to patient over the phone at this time in an effort to schedule HBO consult. Patient unable to get a ride to appointment until next week. HBO consult scheduled for 12/21/21 @ 0900. Patient instructed to arrive 15 minutes early and enter through the main entrance wearing a mask. Instructed patient to stop at registration prior to arriving at [address] [place] [address], should have insurance card and photo ID available. Patient verbalizes understanding, all questions answered.

## 2021-12-18 ENCOUNTER — APPOINTMENT (OUTPATIENT)
Dept: ULTRASOUND IMAGING | Age: 61
End: 2021-12-18
Payer: COMMERCIAL

## 2021-12-18 ENCOUNTER — HOSPITAL ENCOUNTER (EMERGENCY)
Age: 61
Discharge: HOME | End: 2021-12-18
Attending: EMERGENCY MEDICINE
Payer: COMMERCIAL

## 2021-12-18 VITALS
BODY MASS INDEX: 29.63 KG/M2 | HEIGHT: 70 IN | SYSTOLIC BLOOD PRESSURE: 168 MMHG | RESPIRATION RATE: 20 BRPM | HEART RATE: 85 BPM | TEMPERATURE: 97.7 F | DIASTOLIC BLOOD PRESSURE: 77 MMHG | OXYGEN SATURATION: 97 % | WEIGHT: 207 LBS

## 2021-12-18 DIAGNOSIS — K40.90 UNILATERAL INGUINAL HERNIA WITHOUT OBSTRUCTION OR GANGRENE, RECURRENCE NOT SPECIFIED: Primary | ICD-10-CM

## 2021-12-18 LAB
BILIRUBIN URINE: NEGATIVE
BLOOD, URINE: NEGATIVE
CLARITY: CLEAR
COLOR: YELLOW
GLUCOSE URINE: 100 MG/DL
KETONES, URINE: NEGATIVE MG/DL
LEUKOCYTE ESTERASE, URINE: NEGATIVE
NITRITE, URINE: NEGATIVE
PH UA: 7 (ref 5–9)
PROTEIN UA: NEGATIVE MG/DL
SPECIFIC GRAVITY UA: 1.01 (ref 1–1.03)
UROBILINOGEN, URINE: 1 E.U./DL

## 2021-12-18 PROCEDURE — 81003 URINALYSIS AUTO W/O SCOPE: CPT

## 2021-12-18 PROCEDURE — 99283 EMERGENCY DEPT VISIT LOW MDM: CPT

## 2021-12-18 PROCEDURE — 93975 VASCULAR STUDY: CPT

## 2021-12-18 PROCEDURE — 76870 US EXAM SCROTUM: CPT

## 2021-12-18 NOTE — ED PROVIDER NOTES
History of Present Illness     Patient Identification  Azael Caruso is a 57 y.o. male. Patient information was obtained from patient. History/Exam limitations: none. Patient presented to the Emergency Department by private vehicle. Chief Complaint   Testicle Swelling (times one and half years.  )      Trudy Lockhart is a 50-year-old male with right testicular and scrotal swelling for the past 18 months. Today he was unable to find his penis and so came to the emergency department. He denies any scrotal pain. He has had no fever or chills. He denies nausea vomiting. He denies any scrotal trauma. He is diabetic. There is no urinary frequency or burning. Past Medical History:   Diagnosis Date    Cerebral artery occlusion with cerebral infarction ([address]     Diabetes mellitus ([address]     Type 2    Hemiparesis affecting left side as late effect of cerebrovascular accident ([facility] [address]     LEFT SIDE NON DOMINANT FOLLOWING STROKE    Hypertension      History reviewed. No pertinent family history. (20410)  No current facility-administered medications for this encounter. Current Outpatient Medications   Medication Sig Dispense Refill    cefdinir (OMNICEF) 300 MG capsule Take 1 capsule by mouth 2 times daily 60 capsule 0    diphenhydrAMINE (BENADRYL ALLERGY) 25 MG capsule Take 25 mg by mouth every 6 hours as needed for Itching      gabapentin (NEURONTIN) 100 MG capsule Take 100 mg by mouth 3 times daily.        doxycycline hyclate (VIBRAMYCIN) 100 MG capsule Take 100 mg by mouth 2 times daily      B Complex-C-E-Zn (STRESS B/ZINC) TABS Take 1 tablet by mouth daily      acetaminophen (TYLENOL) 325 MG tablet Take 650 mg by mouth every 6 hours as needed for Pain      bisacodyl (DULCOLAX) 10 MG suppository Place 10 mg rectally daily      ferrous sulfate (IRON 325) 325 (65 Fe) MG tablet Take 325 mg by mouth daily (with breakfast)      magnesium hydroxide (MILK OF MAGNESIA CONCENTRATE) 2400 MG/10ML SUSP Take 30 mLs by mouth daily as needed      glucose (GLUTOSE) 40 % GEL Take 37.5 mLs by mouth as needed (hypoglycemia) 45 g 1    glucagon, rDNA, 1 MG injection Inject 1 mg into the muscle as needed for Low blood sugar (Blood glucose less than 70 mg/dL and patient NOT ALERT or NPO and does not have IV access. ) 1 each 0    vitamin D (ERGOCALCIFEROL) 1.25 MG (47628 UT) CAPS capsule Take 1 capsule by mouth once a week 5 capsule 0    ipratropium-albuterol (DUONEB) 0.5-2.5 (3) MG/3ML SOLN nebulizer solution Inhale 3 mLs into the lungs every 4 hours 360 mL 0    pantoprazole (PROTONIX) 40 MG tablet Take 1 tablet by mouth every morning (before breakfast) 30 tablet 3    melatonin 3 MG TABS tablet Take 9 mg by mouth nightly as needed      aspirin 81 MG EC tablet Take 81 mg by mouth daily      hydroCHLOROthiazide (HYDRODIURIL) 25 MG tablet Take 25 mg by mouth daily      glimepiride (AMARYL) 2 MG tablet Take 1 tablet by mouth daily (with breakfast) 30 tablet 3    metFORMIN (GLUCOPHAGE) 500 MG tablet Take 1 tablet by mouth 2 times daily (with meals) 60 tablet 3    atorvastatin (LIPITOR) 40 MG tablet Take 1 tablet by mouth nightly 30 tablet 3    clopidogrel (PLAVIX) 75 MG tablet Take 1 tablet by mouth daily 30 tablet 3     Allergies   Allergen Reactions    Pcn [Penicillins]      Social History     Socioeconomic History    Marital status: Single     Spouse name: Not on file    Number of children: Not on file    Years of education: Not on file    Highest education level: Not on file   Occupational History    Not on file   Tobacco Use    Smoking status: Former Smoker    Smokeless tobacco: Never Used   Vaping Use    Vaping Use: Never used   Substance and Sexual Activity    Alcohol use: Not Currently     Comment: Former every day drinker for most of adult life    Drug use: No    Sexual activity: Not on file   Other Topics Concern    Not on file   Social History Narrative    Not on file     Social Determinants of Health Financial Resource Strain:     Difficulty of Paying Living Expenses: Not on file   Food Insecurity:     Worried About Running Out of Food in the Last Year: Not on file    Antoni of Food in the Last Year: Not on file   Transportation Needs:     Lack of Transportation (Medical): Not on file    Lack of Transportation (Non-Medical):  Not on file   Physical Activity:     Days of Exercise per Week: Not on file    Minutes of Exercise per Session: Not on file   Stress:     Feeling of Stress : Not on file   Social Connections:     Frequency of Communication with Friends and Family: Not on file    Frequency of Social Gatherings with Friends and Family: Not on file    Attends [employer] Services: Not on file    Active Member of [address] [place] [address] or Organizations: Not on file    Attends Club or Organization Meetings: Not on file    Marital Status: Not on file   Intimate Partner Violence:     Fear of Current or Ex-Partner: Not on file    Emotionally Abused: Not on file    Physically Abused: Not on file    Sexually Abused: Not on file   Housing Stability:     Unable to Pay for Housing in the Last Year: Not on file    Number of [place] in the Last Year: Not on file    Unstable Housing in the Last Year: Not on file     Review of Systems  A comprehensive review of systems was negative except for: Genitourinary: positive for scrotal edema     Physical Exam     BP (!) 168/77   Pulse 85   Temp 97.7 °F (36.5 °C) (Tympanic)   Resp 20   Ht 5' 10\" (1.778 m)   Wt 207 lb (93.9 kg)   SpO2 97%   BMI 29.70 kg/m²   General Appearance:   Alert, cooperative, no distress, appears stated age  Head:   Normocephalic, without obvious abnormality, atraumatic  Eyes:   PERRL, conjunctiva/corneas clear,   EOM's intact, fundi    benign, both eyesEars:   Normal TM's and external ear canals, both earsNose:    Nares normal, septum midline, mucosa normal, no drainage    or sinus tenderness  Throat:  Lips, mucosa, and tongue normal; teeth and gums normal  Neck:  Supple, symmetrical, trachea midline, no adenopathy;    thyroid:  no enlargement/tenderness/nodules; no carotid   bruit or JVD  Back:    Symmetric, no curvature, ROM normal, no CVA tendernessLungs:    Clear to auscultation bilaterally, respirations unlabored  Chest Wall:   No tenderness or deformity Heart:   Regular rate and rhythm, S1 and S2 normal, no murmur, rub   or gallop  Abdomen:    Soft, mild epigastric tenderness, bowel sounds active all four quadrants, no masses, no organomegaly  Extremities:  Extremities normal, atraumatic, no cyanosis or edema Pulses:  2+ and symmetric all extremities  Skin:  Skin color, texture, turgor normal, no rashes or lesionsLymph nodes:  Cervical, supraclavicular, and axillary nodes normal  Neurologic:  CNII-XII intact, normal strength, sensation and reflexes throughout  Significant right scrotal edema without erythema warmth or tenderness. ED Course          --------------------------------------------- PAST HISTORY ---------------------------------------------  Past Medical History:  has a past medical history of Cerebral artery occlusion with cerebral infarction ([facility] [address] [facility] Hemiparesis affecting left side as late effect of cerebrovascular accident [facility]), and Hypertension. Past Surgical History:  has a past surgical history that includes Tonsillectomy; Knee arthroscopy; Finger amputation; Upper gastrointestinal endoscopy (N/A, 1/4/2021); Foot Debridement (Left, 2/16/2021); and transesophageal echocardiogram (N/A, 2/22/2021). Social History:  reports that he has quit smoking. He has never used smokeless tobacco. He reports previous alcohol use. He reports that he does not use drugs. Family History: family history is not on file. The patients home medications have been reviewed.     Allergies: Pcn [penicillins]    -------------------------------------------------- RESULTS -------------------------------------------------  Labs:  Results for orders placed or performed during the hospital encounter of 12/18/21   Urinalysis   Result Value Ref Range    Color, UA Yellow Straw/Yellow    Clarity, UA Clear Clear    Glucose, Ur 100 (A) Negative mg/dL    Bilirubin Urine Negative Negative    Ketones, Urine Negative Negative mg/dL    Specific Gravity, UA 1.010 1.005 - 1.030    Blood, Urine Negative Negative    pH, UA 7.0 5.0 - 9.0    Protein, UA Negative Negative mg/dL    Urobilinogen, Urine 1.0 <2.0 E.U./dL    Nitrite, Urine Negative Negative    Leukocyte Esterase, Urine Negative Negative       Radiology:  US DUP ABD PEL RETRO SCROT COMPLETE   Final Result   1. Large scrotal hernia which contains bowel. Otherwise negative exam.      2.  Normal ultrasound right and left testicles. No hydrocele. RECOMMENDATIONS:   Unavailable         US SCROTUM AND TESTICLES   Final Result   1. Large scrotal hernia which contains bowel. Otherwise negative exam.      2.  Normal ultrasound right and left testicles. No hydrocele. RECOMMENDATIONS:   Unavailable             ------------------------- NURSING NOTES AND VITALS REVIEWED ---------------------------  Date / Time Roomed:  12/18/2021  3:14 PM  ED Bed Assignment:  [id number]    The nursing notes within the ED encounter and vital signs as below have been reviewed. BP (!) 168/77   Pulse 85   Temp 97.7 °F (36.5 °C) (Tympanic)   Resp 20   Ht 5' 10\" (1.778 m)   Wt 207 lb (93.9 kg)   SpO2 97%   BMI 29.70 kg/m²   Oxygen Saturation Interpretation: Normal      ------------------------------------------ PROGRESS NOTES ------------------------------------------  I have spoken with the patient and discussed todays results, in addition to providing specific details for the plan of care and counseling regarding the diagnosis and prognosis. Their questions are answered at this time and they are agreeable with the plan.  I discussed at length with them reasons for immediate return here for re evaluation. They will followup with primary care by calling their office tomorrow. --------------------------------- ADDITIONAL PROVIDER NOTES ---------------------------------  At this time the patient is without objective evidence of an acute process requiring hospitalization or inpatient management. They have remained hemodynamically stable throughout their entire ED visit and are stable for discharge with outpatient follow-up. The plan has been discussed in detail and they are aware of the specific conditions for emergent return, as well as the importance of follow-up. New Prescriptions    No medications on file       Diagnosis:  1. Unilateral inguinal hernia without obstruction or gangrene, recurrence not specified        Disposition:  Patient's disposition: Discharge to nursing home  Patient's condition is stable.          Margarita Mcdaniels MD  12/18/21 1600

## 2021-12-20 ENCOUNTER — HOSPITAL ENCOUNTER (OUTPATIENT)
Dept: WOUND CARE | Age: 61
Discharge: HOME | End: 2021-12-20
Payer: COMMERCIAL

## 2021-12-20 VITALS
RESPIRATION RATE: 18 BRPM | DIASTOLIC BLOOD PRESSURE: 62 MMHG | HEIGHT: 70 IN | TEMPERATURE: 98.5 F | BODY MASS INDEX: 29.63 KG/M2 | HEART RATE: 86 BPM | SYSTOLIC BLOOD PRESSURE: 120 MMHG | WEIGHT: 207 LBS

## 2021-12-20 DIAGNOSIS — E11.628 DIABETIC FOOT INFECTION (HCC): Primary | ICD-10-CM

## 2021-12-20 DIAGNOSIS — L08.9 DIABETIC FOOT INFECTION (HCC): Primary | ICD-10-CM

## 2021-12-20 PROCEDURE — 6370000000 HC RX 637 (ALT 250 FOR IP): Performed by: PODIATRIST

## 2021-12-20 PROCEDURE — 11042 DBRDMT SUBQ TIS 1ST 20SQCM/<: CPT

## 2021-12-20 RX ORDER — BETAMETHASONE DIPROPIONATE 0.05 %
OINTMENT (GRAM) TOPICAL ONCE
Status: CANCELLED | OUTPATIENT
Start: 2021-12-20 | End: 2021-12-20

## 2021-12-20 RX ORDER — GINSENG 100 MG
CAPSULE ORAL ONCE
Status: CANCELLED | OUTPATIENT
Start: 2021-12-20 | End: 2021-12-20

## 2021-12-20 RX ORDER — LIDOCAINE HYDROCHLORIDE 20 MG/ML
JELLY TOPICAL ONCE
Status: CANCELLED | OUTPATIENT
Start: 2021-12-20 | End: 2021-12-20

## 2021-12-20 RX ORDER — LIDOCAINE HYDROCHLORIDE 40 MG/ML
SOLUTION TOPICAL ONCE
Status: CANCELLED | OUTPATIENT
Start: 2021-12-20 | End: 2021-12-20

## 2021-12-20 RX ORDER — LIDOCAINE 40 MG/G
CREAM TOPICAL ONCE
Status: CANCELLED | OUTPATIENT
Start: 2021-12-20 | End: 2021-12-20

## 2021-12-20 RX ORDER — GENTAMICIN SULFATE 1 MG/G
OINTMENT TOPICAL ONCE
Status: CANCELLED | OUTPATIENT
Start: 2021-12-20 | End: 2021-12-20

## 2021-12-20 RX ORDER — BACITRACIN ZINC AND POLYMYXIN B SULFATE 500; 1000 [USP'U]/G; [USP'U]/G
OINTMENT TOPICAL ONCE
Status: CANCELLED | OUTPATIENT
Start: 2021-12-20 | End: 2021-12-20

## 2021-12-20 RX ORDER — BACITRACIN, NEOMYCIN, POLYMYXIN B 400; 3.5; 5 [USP'U]/G; MG/G; [USP'U]/G
OINTMENT TOPICAL ONCE
Status: CANCELLED | OUTPATIENT
Start: 2021-12-20 | End: 2021-12-20

## 2021-12-20 RX ORDER — LIDOCAINE HYDROCHLORIDE 40 MG/ML
SOLUTION TOPICAL ONCE
Status: COMPLETED | OUTPATIENT
Start: 2021-12-20 | End: 2021-12-20

## 2021-12-20 RX ORDER — CLOBETASOL PROPIONATE 0.5 MG/G
OINTMENT TOPICAL ONCE
Status: CANCELLED | OUTPATIENT
Start: 2021-12-20 | End: 2021-12-20

## 2021-12-20 RX ORDER — LIDOCAINE 50 MG/G
OINTMENT TOPICAL ONCE
Status: CANCELLED | OUTPATIENT
Start: 2021-12-20 | End: 2021-12-20

## 2021-12-20 RX ADMIN — LIDOCAINE HYDROCHLORIDE: 40 SOLUTION TOPICAL at 13:13

## 2021-12-20 ASSESSMENT — PAIN SCALES - GENERAL: PAINLEVEL_OUTOF10: 4

## 2021-12-20 ASSESSMENT — PAIN DESCRIPTION - PAIN TYPE: TYPE: CHRONIC PAIN

## 2021-12-20 ASSESSMENT — PAIN DESCRIPTION - ORIENTATION: ORIENTATION: LEFT

## 2021-12-20 ASSESSMENT — PAIN DESCRIPTION - LOCATION: LOCATION: FOOT

## 2021-12-20 NOTE — PROGRESS NOTES
Called to schedule patient and he declined due to having \"too much going on'.     Electronically signed by Claudell Justin, MA on 12/20/2021 at 10:55 AM

## 2021-12-20 NOTE — PROGRESS NOTES
[facility]  History and Physical/Consultation  Podiatry    Referring Physician : Angy Johnson MD  [id number] [facility] RECORD NUMBER:  [id number]  AGE: 57 y.o. GENDER: male  : 1960  EPISODE DATE:  2021  Subjective:     Chief Complaint   Patient presents with    Wound Check     left heel         HISTORY of PRESENT ILLNESS HPI     Rey Donald is a 57 y.o. male who presents today for wound/ulcer evaluation. History of Wound Context:  The patient has had a wound of bilateral heels which was first noted approximately over a month ago. This has been treated with surgical debridement. On their initial visit to the wound healing center, 21 ,  the patient has noted that the wound has not been improving. The patient has had similar previous wounds in the past.      Pt is not on abx at time of initial visit. 3/22/21 - Wound VAC MWF continue to left heel. Aquacel Ag to right heel. Debrided toenails 1-5 in thickness and length. FU 1 week. IV Abx per Dr. Ivan Acuña. 21 - Wound VAC MWF to left heel. Cultures obtained. FU 1 week after visit with Dr. Ivan Acuña. PO Abx.  21 - DC wound VAC to left heel. Alginate to wound. FU 1 week  21 - Alginate and gentamicin ointment QOD. Partial WB to heel at 50% to foot with surgical shoe and walker. 5/3/21 - Alginate and gentamicin ointment QOD. MRI left heel    5/10/21 - Alginate and gentamicin ointment QOD. MRI left heel pending. 21 - Alginate and gentamicin ointment QOD. MRI reviewed confirming OM. Discussed HBOT referral and partial calcanectomy. He opts for HBOT consultation. 21 - Alginate and add gentamicin ointment QD. HBOT referral.  Patient hold off on partial calcanectomy. FU 1 week    21 - Alginate and add gentamicin ointment QD. HBOT referral.  Patient hold off on partial calcanectomy. FU 1 week     21 - Alginate and add gentamicin ointment QD.   HBOT referral.  Patient hold off on partial calcanectomy. FU 1 week. Patient gave me consent (verbal) to speak with his brother Pacheco Shaffer regarding his at home arrangement. HBOT is on hold until patient is at home. 7/12/21 -  Alginate and add gentamicin ointment QD. HBOT referral.  Patient considering partial calcanectomy with flap. FU 1 week. 7/19/21 - Alginate and add gentamicin ointment QD. HBOT referral.  Patient considering partial calcanectomy with flap. FU 1 week. 8/2/21 - Alginate and add gentamicin ointment QD. HBOT referral.  Patient considering partial calcanectomy with flap. FU 1 week. 8/16/21 - Alginate and add gentamicin ointment QD. HBOT referral.  Patient considering partial calcanectomy with flap. FU 1 week. 8/23/21 - Alginate and add gentamicin ointment QD. HBOT referral.  Patient considering partial calcanectomy with flap. FU 1 week. 8/30/21 - Alginate and add gentamicin ointment QD. HBOT referral.  Patient considering partial calcanectomy with flap. FU 1 week. 9/20/21 - Alginate and add gentamicin ointment QD. HBOT referral.  Patient considering partial calcanectomy with flap. FU 1 week. 10/25/21 Alginate and add gentamicin ointment QD. HBOT referral.  Patient considering partial calcanectomy with flap. FU 1 week. 11/1/21 Alginate and add gentamicin ointment QD. HBOT referral.  Patient considering partial calcanectomy with flap. FU 1 week. 11/8/21 - Alginate and add gentamicin ointment QD. HBOT referral.  Patient considering partial calcanectomy with flap. FU 1 week. 11/15/21 - Alginate and add gentamicin ointment QD. HBOT referral.  Patient considering partial calcanectomy with flap. FU 1 week. 11/22/21 -  Alginate and add gentamicin ointment QD. HBOT referral.  Patient considering partial calcanectomy with flap. FU 1 week. 11/29/21 0 Alginate and gentamicin ointment. Possible DC home.   HBOT referral.  Patient considering partial calcanectomy with flap. FU 1 week. 12/6/21 - Alginate and gentamicin ointment. Possible DC home. HBOT referral.  Patient considering partial calcanectomy with flap. FU 1 week. 12/13/21 - Alginate and gentamicin ointment. DC home. HBOT referral.  [facility] referral for wound care and PT for GAIT training and safe DME use. Patient considering partial calcanectomy with flap. FU 1 week. 12/20/21 -  Alginate and gentamicin ointment. DC home.   HBOT referral.     Wound/Ulcer Pain Timing/Severity: none  Quality of pain: N/A  Severity:  0 / 10   Modifying Factors: None  Associated Signs/Symptoms: none    Ulcer Identification:  Ulcer Type: arterial and diabetic  Contributing Factors: diabetes    Diabetic/Pressure/Non Pressure Ulcers onl y:  Ulcer: Diabetic ulcer, fat layer exposed    If patient has diabetic lower extremity wounds  [place] Classification of diabetic lower extremity wounds:    Grade Description   []  0 No open wound   []  1 Superficial ulcer involving the full skin thickness   [x]  2 Deep ulcer involves ligament, tendon, joint capsule, or fascia  No bone involvement or abscess presence   []  3 Deep Ulcer with abcess formation and/or osteomyelitis   []  4 Localized gangrene   []  5 Extensive gangrene of the foot     Wound: N/A        PAST MEDICAL HISTORY      Diagnosis Date    Cerebral artery occlusion with cerebral infarction ([address]     Diabetes mellitus ([address]     Type 2    Hemiparesis affecting left side as late effect of cerebrovascular accident ([address]     LEFT SIDE NON DOMINANT FOLLOWING STROKE    Hypertension      Past Surgical History:   Procedure Laterality Date    FINGER AMPUTATION      FOOT DEBRIDEMENT Left 2/16/2021    LEFT FOOT DEBRIDEMENT WITH BONE BIOPSY, WOUND VAC APPLICATION performed by Heladio Ceballos DPM at [place] [id number] ARTHROSCOPY      TONSILLECTOMY      TRANSESOPHAGEAL ECHOCARDIOGRAM N/A 2/22/2021    TRANSESOPHAGEAL ECHOCARDIOGRAM WITH BUBBLE STUDY performed by Michelle Theodore MD at [address] N/A 1/4/2021    EGD BIOPSY performed by Kevin Cisse DO at [facility] reviewed. No pertinent family history. Social History     Tobacco Use    Smoking status: Former Smoker    Smokeless tobacco: Never Used   Vaping Use    Vaping Use: Never used   Substance Use Topics    Alcohol use: Not Currently     Comment: Former every day drinker for most of adult life    Drug use: No     Allergies   Allergen Reactions    Pcn [Penicillins]      Current Outpatient Medications on File Prior to Encounter   Medication Sig Dispense Refill    cefdinir (OMNICEF) 300 MG capsule Take 1 capsule by mouth 2 times daily 60 capsule 0    diphenhydrAMINE (BENADRYL ALLERGY) 25 MG capsule Take 25 mg by mouth every 6 hours as needed for Itching      gabapentin (NEURONTIN) 100 MG capsule Take 100 mg by mouth 3 times daily.  doxycycline hyclate (VIBRAMYCIN) 100 MG capsule Take 100 mg by mouth 2 times daily      B Complex-C-E-Zn (STRESS B/ZINC) TABS Take 1 tablet by mouth daily      acetaminophen (TYLENOL) 325 MG tablet Take 650 mg by mouth every 6 hours as needed for Pain      bisacodyl (DULCOLAX) 10 MG suppository Place 10 mg rectally daily      ferrous sulfate (IRON 325) 325 (65 Fe) MG tablet Take 325 mg by mouth daily (with breakfast)      magnesium hydroxide (MILK OF MAGNESIA CONCENTRATE) 2400 MG/10ML SUSP Take 30 mLs by mouth daily as needed      glucose (GLUTOSE) 40 % GEL Take 37.5 mLs by mouth as needed (hypoglycemia) 45 g 1    glucagon, rDNA, 1 MG injection Inject 1 mg into the muscle as needed for Low blood sugar (Blood glucose less than 70 mg/dL and patient NOT ALERT or NPO and does not have IV access. ) 1 each 0    vitamin D (ERGOCALCIFEROL) 1.25 MG (29152 UT) CAPS capsule Take 1 capsule by mouth once a week 5 capsule 0    ipratropium-albuterol (DUONEB) 0.5-2.5 (3) MG/3ML SOLN nebulizer solution Inhale 3 mLs into the lungs every 4 hours 360 mL 0    pantoprazole (PROTONIX) 40 MG tablet Take 1 tablet by mouth every morning (before breakfast) 30 tablet 3    melatonin 3 MG TABS tablet Take 9 mg by mouth nightly as needed      aspirin 81 MG EC tablet Take 81 mg by mouth daily      hydroCHLOROthiazide (HYDRODIURIL) 25 MG tablet Take 25 mg by mouth daily      glimepiride (AMARYL) 2 MG tablet Take 1 tablet by mouth daily (with breakfast) 30 tablet 3    metFORMIN (GLUCOPHAGE) 500 MG tablet Take 1 tablet by mouth 2 times daily (with meals) 60 tablet 3    atorvastatin (LIPITOR) 40 MG tablet Take 1 tablet by mouth nightly 30 tablet 3    clopidogrel (PLAVIX) 75 MG tablet Take 1 tablet by mouth daily 30 tablet 3     No current facility-administered medications on file prior to encounter.        REVIEW OF SYSTEMS   ROS : All others Negative if blank [], Positive if [x]  General Vascular   [] Fevers [] Claudication   [] Chills [] Rest Pain   Skin Neurologic   [x] Tissue Loss [x] Lower extremity neuropathy     Objective:    /62   Pulse 86   Temp 98.5 °F (36.9 °C) (Temporal)   Resp 18   Ht 5' 10\" (1.778 m)   Wt 207 lb (93.9 kg)   BMI 29.70 kg/m²   Wt Readings from Last 3 Encounters:   12/20/21 207 lb (93.9 kg)   12/18/21 207 lb (93.9 kg)   12/13/21 207 lb (93.9 kg)       PHYSICAL EXAM   CONSTITUTIONAL:   Awake, alert, cooperative   PSYCHIATRIC :  Oriented to time, place and person      normal insight to disease process  EXTREMITIES:   R LE Open wounds are noted   Skin color is normal   Edema is not noted   Sensation deficit noted - to foot   Palpation of the foot does cause pain   5/5 strength DF/PF  L LE Open wounds are noted   Skin color is abnormal with hyperpigmentation   Edema is not noted   Sensation deficit noted - bilateral feet   Palpation of the foot does cause pain   5/5 strength DF/PF  R dorsalis pedis +2 L dorsalis pedis +2   R posterior tibial +2 L posterior tibial +2     Assessment:     Problem List Items Addressed This Visit Diabetic foot infection ([facility] - Primary    Relevant Orders    Initiate Outpatient Wound Care Protocol          Pre Debridement Measurements:  Are located in the [facility]  Documentation Flow Sheet  Post Debridement Measurements:  Wound/Ulcer Descriptions are Pre Debridement except measurements:     Wound 12/26/20 Arm Left (Active)   Number of days: 358       Wound 02/15/21 Heel Left (Active)   Number of days: 308       Wound 02/15/21 Heel Right (Active)   Number of days: 308       Wound 03/08/21 Heel Left #1 left heel aquired 12/1/20 (Active)   Wound Image   11/29/21 1504   Dressing Status New dressing applied;Clean;Dry; Intact 12/13/21 1639   Wound Cleansed Cleansed with saline 12/13/21 1639   Dressing/Treatment Alginate;ABD;Dry dressing 12/13/21 1639   Offloading for Diabetic Foot Ulcers Post op shoe 12/13/21 1639   Wound Length (cm) 4.5 cm 12/20/21 1313   Wound Width (cm) 4 cm 12/20/21 1313   Wound Depth (cm) 0.7 cm 12/20/21 1313   Wound Surface Area (cm^2) 18 cm^2 12/20/21 1313   Change in Wound Size % (l*w) -48.88 12/20/21 1313   Wound Volume (cm^3) 12.6 cm^3 12/20/21 1313   Wound Healing % -49 12/20/21 1313   Post-Procedure Length (cm) 4.6 cm 12/20/21 1333   Post-Procedure Width (cm) 4.1 cm 12/20/21 1333   Post-Procedure Depth (cm) 0.8 cm 12/20/21 1333   Post-Procedure Surface Area (cm^2) 18.86 cm^2 12/20/21 1333   Post-Procedure Volume (cm^3) 15.088 cm^3 12/20/21 1333   Undermining Starts ___ O'Clock 6 07/19/21 1054   Undermining Ends___ O'Clock 3 07/19/21 1054   Undermining Maxium Distance (cm) 0 10/04/21 1456   Wound Assessment Fibrin;Pale granulation tissue;Slough 12/20/21 1313   Drainage Amount Large 12/20/21 1313   Drainage Description Yellow; Thick 12/20/21 1313   Odor None 12/20/21 1313   Adrianne-wound Assessment Maceration 12/20/21 1313   Number of days: 287       Wound 03/08/21 Heel Right #2 rt heel aquired 12/1/20 (Active)   Wound Image   04/05/21 0929   Dressing Status New dressing applied 03/18/21 1352   Wound Cleansed Cleansed with saline 04/01/21 1536   Dressing/Treatment Alginate;Collagen;Silicone border 04/10/38 1536   Offloading for Diabetic Foot Ulcers Post op shoe 04/01/21 1536   Wound Length (cm) 0 cm 04/05/21 0929   Wound Width (cm) 0 cm 04/05/21 0929   Wound Depth (cm) 0 cm 04/05/21 0929   Wound Surface Area (cm^2) 0 cm^2 04/05/21 0929   Change in Wound Size % (l*w) 100 04/05/21 0929   Wound Volume (cm^3) 0 cm^3 04/05/21 0929   Wound Healing % 100 04/05/21 0929   Post-Procedure Length (cm) 0 cm 04/05/21 1011   Post-Procedure Width (cm) 0 cm 04/05/21 1011   Post-Procedure Depth (cm) 0 cm 04/05/21 1011   Post-Procedure Surface Area (cm^2) 0 cm^2 04/05/21 1011   Post-Procedure Volume (cm^3) 0 cm^3 04/05/21 1011   Wound Assessment Fibrin 04/01/21 1357   Drainage Amount None 04/01/21 1357   Drainage Description Yellow 03/22/21 0911   Odor None 04/01/21 1357   Adrianne-wound Assessment Maceration 04/01/21 1357   Number of days: 287     Incision 02/19/21 Groin Right (Active)   Number of days: 304       Procedure Note  Indications:  Based on my examination of this patient's wound(s)/ulcer(s) today, debridement is required to promote healing and evaluate the wound base. Performed by: Julio Duque DPM    Consent obtained:  Yes    Time out taken:  Yes    Pain Control: Anesthetic  Anesthetic: 4% Lidocaine Liquid Topical     Debridement:Excisional Debridement    Using curette the wound(s)/ulcer(s) was/were sharply debrided down through and including the removal of subcutaneous tissue. Devitalized Tissue Debrided:  slough to stimulate bleeding to promote healing, post debridement good bleeding base and wound edges noted    Wound/Ulcer #: 2    Percent of Wound/Ulcer Debrided: 100%    Total Surface Area Debrided:  18.86 sq cm     Estimated Blood Loss:  None  Hemostasis Achieved:  by pressure    Procedural Pain:  2  / 10   Post Procedural Pain:  2 / 10     Response to treatment:  Well tolerated by patient. A culture was done. Plan:     Pt is a smoker   - Discussed relationship of smoking and negative affects on wound healing   - Emphasized importance of tobacco avoidace/cessation   - Script for nicotine patch given to patient   - Information regarding support groups for smoking cessation given    In my professional opinion and based off the information that is available at this time this patient has appropriate indication for HBO Therapy: Maybe    Treatment Note please see attached Discharge Instructions    Written patient dismissal instructions given to patient and signed by patient or POA.            Electronically signed by Douglas Henderson DPM on 12/20/2021 at 1:43 PM

## 2021-12-21 ENCOUNTER — HOSPITAL ENCOUNTER (OUTPATIENT)
Dept: HYPERBARIC MEDICINE | Age: 61
Setting detail: THERAPIES SERIES
Discharge: HOME | End: 2021-12-21
Payer: COMMERCIAL

## 2021-12-21 VITALS
RESPIRATION RATE: 18 BRPM | HEART RATE: 101 BPM | TEMPERATURE: 98 F | SYSTOLIC BLOOD PRESSURE: 125 MMHG | DIASTOLIC BLOOD PRESSURE: 90 MMHG

## 2021-12-21 DIAGNOSIS — I70.244 ATHEROSCLEROSIS OF NATIVE ARTERY OF LEFT LOWER EXTREMITY WITH ULCERATION OF HEEL (HCC): ICD-10-CM

## 2021-12-21 DIAGNOSIS — E11.621 DIABETIC FOOT ULCER WITH OSTEOMYELITIS (HCC): ICD-10-CM

## 2021-12-21 DIAGNOSIS — I70.244 ATHEROSCLEROSIS OF NATIVE ARTERY OF LEFT LOWER EXTREMITY WITH ULCERATION OF HEEL (HCC): Primary | ICD-10-CM

## 2021-12-21 DIAGNOSIS — Z98.62 PERIPHERAL VASCULAR ANGIOPLASTY STATUS: ICD-10-CM

## 2021-12-21 DIAGNOSIS — L97.509 DIABETIC FOOT ULCER WITH OSTEOMYELITIS (HCC): ICD-10-CM

## 2021-12-21 DIAGNOSIS — M86.9 DIABETIC FOOT ULCER WITH OSTEOMYELITIS (HCC): ICD-10-CM

## 2021-12-21 DIAGNOSIS — L97.424 ULCER OF LEFT HEEL, WITH NECROSIS OF BONE (HCC): ICD-10-CM

## 2021-12-21 DIAGNOSIS — E11.69 DIABETIC FOOT ULCER WITH OSTEOMYELITIS (HCC): ICD-10-CM

## 2021-12-21 DIAGNOSIS — L97.423 ULCER OF LEFT HEEL, WITH NECROSIS OF MUSCLE (HCC): ICD-10-CM

## 2021-12-21 PROBLEM — E87.1 HYPONATREMIA: Status: RESOLVED | Noted: 2020-12-26 | Resolved: 2021-12-21

## 2021-12-21 PROBLEM — B95.61 STAPHYLOCOCCUS AUREUS BACTEREMIA: Status: RESOLVED | Noted: 2021-02-16 | Resolved: 2021-12-21

## 2021-12-21 PROBLEM — R78.81 STAPHYLOCOCCUS AUREUS BACTEREMIA: Status: RESOLVED | Noted: 2021-02-16 | Resolved: 2021-12-21

## 2021-12-21 PROCEDURE — 99204 OFFICE O/P NEW MOD 45 MIN: CPT | Performed by: SURGERY

## 2021-12-21 PROCEDURE — 99204 OFFICE O/P NEW MOD 45 MIN: CPT

## 2021-12-21 NOTE — CONSULTS
Chief Complaint: Patient seen for evaluation of hyperbaric oxygen therapy, for treatment of diabetic foot ulcer with osteomyelitis of the calcaneum documented by MRI done in May 2021      HPI: This patient has diabetes mellitus with diabetic neuropathy and diabetic foot ulcers with muscle necrosis and bone necrosis, underwent MRI of the heel, revealed evidence of osteomyelitis of the calcaneum, underwent left femoral angiogram and angioplasty of the social femoral artery popliteal artery by his vascular surgeon Dr. Naomi Rizzo, in [facility], also underwent left heel debridement, for necrosis of the muscle with osteomyelitis of the left foot by his podiatrist Dr. Cm Pham in February, being followed regularly at the wound care center, showing no progress, plantar surface distal foot debridement including partial calcanectomy according information provided to me, in the interim patient referred to the hyperbaric oxygen therapy department for consideration for treatments to explain the wound healing, in collaboration with the antibiotics per ID consultants and local wound care by podiatry service    When I came to see patient, previous history of fever chills    Patient at the present can walk short distances    Patient denies any ear problems or any chest pain, cough shortness of breath, or congestive heart failure      Patient denies any focal lateralizing neurological symptoms like loss of speech, vision or loss of function of extremity    No evidence of carotid bruit    Allergies   Allergen Reactions    Pcn [Penicillins]        Current Outpatient Medications   Medication Sig Dispense Refill    cefdinir (OMNICEF) 300 MG capsule Take 1 capsule by mouth 2 times daily 60 capsule 0    diphenhydrAMINE (BENADRYL ALLERGY) 25 MG capsule Take 25 mg by mouth every 6 hours as needed for Itching      gabapentin (NEURONTIN) 100 MG capsule Take 100 mg by mouth 3 times daily.        doxycycline hyclate (VIBRAMYCIN) 100 MG capsule Take 100 mg by mouth 2 times daily      B Complex-C-E-Zn (STRESS B/ZINC) TABS Take 1 tablet by mouth daily      acetaminophen (TYLENOL) 325 MG tablet Take 650 mg by mouth every 6 hours as needed for Pain      bisacodyl (DULCOLAX) 10 MG suppository Place 10 mg rectally daily      ferrous sulfate (IRON 325) 325 (65 Fe) MG tablet Take 325 mg by mouth daily (with breakfast)      magnesium hydroxide (MILK OF MAGNESIA CONCENTRATE) 2400 MG/10ML SUSP Take 30 mLs by mouth daily as needed      glucose (GLUTOSE) 40 % GEL Take 37.5 mLs by mouth as needed (hypoglycemia) 45 g 1    glucagon, rDNA, 1 MG injection Inject 1 mg into the muscle as needed for Low blood sugar (Blood glucose less than 70 mg/dL and patient NOT ALERT or NPO and does not have IV access. ) 1 each 0    vitamin D (ERGOCALCIFEROL) 1.25 MG (43862 UT) CAPS capsule Take 1 capsule by mouth once a week 5 capsule 0    ipratropium-albuterol (DUONEB) 0.5-2.5 (3) MG/3ML SOLN nebulizer solution Inhale 3 mLs into the lungs every 4 hours 360 mL 0    pantoprazole (PROTONIX) 40 MG tablet Take 1 tablet by mouth every morning (before breakfast) 30 tablet 3    melatonin 3 MG TABS tablet Take 9 mg by mouth nightly as needed      aspirin 81 MG EC tablet Take 81 mg by mouth daily      hydroCHLOROthiazide (HYDRODIURIL) 25 MG tablet Take 25 mg by mouth daily      metFORMIN (GLUCOPHAGE) 500 MG tablet Take 1 tablet by mouth 2 times daily (with meals) 60 tablet 3    atorvastatin (LIPITOR) 40 MG tablet Take 1 tablet by mouth nightly 30 tablet 3    clopidogrel (PLAVIX) 75 MG tablet Take 1 tablet by mouth daily 30 tablet 3     No current facility-administered medications for this encounter.        Past Medical History:   Diagnosis Date    Cerebral artery occlusion with [facility] infarction [facility]     Diabetes mellitus ([facility]     Type 2    Diabetic foot ulcer with osteomyelitis ([facility] 12/21/2021    Hemiparesis affecting left side as late effect of cerebrovascular accident ([facility] LEFT SIDE NON DOMINANT FOLLOWING STROKE    Hypertension     Peripheral vascular angioplasty status 12/21/2021    Ulcer of left heel, with necrosis of muscle ([address] 12/21/2021       Past Surgical History:   Procedure Laterality Date    FINGER AMPUTATION      FOOT DEBRIDEMENT Left 2/16/2021    LEFT FOOT DEBRIDEMENT WITH BONE BIOPSY, WOUND VAC APPLICATION performed by Lea Monteiro DPM at [place] [address] ARTHROSCOPY      TONSILLECTOMY      TRANSESOPHAGEAL ECHOCARDIOGRAM N/A 2/22/2021    TRANSESOPHAGEAL ECHOCARDIOGRAM WITH BUBBLE STUDY performed by Svitlana Diaz MD at [address] N/A 1/4/2021    EGD BIOPSY performed by Ortega Oneil DO at [address] [facility] reviewed. No pertinent family history. Social History     Socioeconomic History    Marital status: Single     Spouse name: Not on file    Number of children: Not on file    Years of education: Not on file    Highest education level: Not on file   Occupational History    Not on file   Tobacco Use    Smoking status: Former Smoker    Smokeless tobacco: Never Used   Vaping Use    Vaping Use: Never used   Substance and Sexual Activity    Alcohol use: Not Currently     Comment: Former every day drinker for most of adult life    Drug use: No    Sexual activity: Not on file   Other Topics Concern    Not on file   Social History Narrative    Not on file     Social Determinants of Health     Financial Resource Strain:     Difficulty of Paying Living Expenses: Not on file   Food Insecurity:     Worried About [address] in the Last Year: Not on file    [address] in the Last Year: Not on file   Transportation Needs:     Lack of Transportation (Medical): Not on file    Lack of Transportation (Non-Medical):  Not on file   Physical Activity:     Days of Exercise per Week: Not on file    Minutes of Exercise per Session: Not on file   Stress:     Feeling of Stress : Not on file Social Connections:     Frequency of Communication with Friends and Family: Not on file    Frequency of Social Gatherings with Friends and Family: Not on file    Attends Yazidi Services: Not on file    Active Member of Clubs or Organizations: Not on file    Attends Club or Organization Meetings: Not on file    Marital Status: Not on file   Intimate Partner Violence:     Fear of Current or Ex-Partner: Not on file    Emotionally Abused: Not on file    Physically Abused: Not on file    Sexually Abused: Not on file   Housing Stability:     Unable to Pay for Housing in the Last Year: Not on file    Number of [place] in the Last Year: Not on file    Unstable Housing in the Last Year: Not on file       Review of Systems:  Skin:  No abnormal pigmentation or rash. Eyes:  No blurring, diplopia or vision loss. Ears/Nose/Throat:  No hearing loss or vertigo. Respiratory:  No cough, pleuritic chest pain, dyspnea, or wheezing. Cardiovascular: No angina, palpitations . Hypertension    Gastrointestinal:  No nausea or vomiting; no abdominal pain or rectal bleeding. Musculoskeletal:  No arthritis or weakness. Neurologic:  No paralysis, paresis, seizures or headaches. History of stroke with left-sided weakness    Hematologic/Lymphatic/Immunologic:  No anemia, abnormal bleeding/bruising. Endocrine:  No heat or cold intolerance. No polyphagia, polydipsia or polyuria. Diabetes mellitus with diabetic neuropathy      Physical Exam:  BP (!) 125/90   Pulse 101   Temp 98 °F (36.7 °C) (Temporal)   Resp 18   General appearance:  Alert, awake, oriented x 3. No distress. Skin:  Warm and dry. Head:  Normocephalic. No masses, lesions or tenderness. Eyes:  Conjunctivae appear normal; PERRL. Ears:  External ears normal.  Nose/Sinuses:  Septum midline, mucosa normal; no drainage. Oropharynx:  Clear, no exudate noted. Neck:  No jugular venous distention, lymphadenopathy or thyromegaly. Patient has no evidence of carotid bruit      Lungs:  Clear to ausculation bilaterally. No rhonchi, crackles, wheezes. Heart:  Regular rate and rhythm. No rub or murmur. .    Abdomen:  Soft, non-tender. No masses, organomegaly. Musculoskeletal: No joint effusions, tenderness swelling or warmth. Neuro: Speech is intact. Moving all extremities. No focal motor or sensory deficits. .  History of stroke with mild left-sided weakness noted          Extremities:  Both feet are warm to touch. The color of both feet is normal.    Patient does have a wound, fairly clean granulating, with exposure of the subcu and muscle, the ball could be felt, 3 mm deep to the subcutaneous tissue, last MRI, and in May revealing evidence of osteomyelitis                             Pulses Right  Left    Brachial 3 3    Radial    3=normal   Femoral 2 2  2=diminished   Popliteal    1=barely palpable   Dorsalis pedis 1 0  0=absent   Posterior tibial    4=aneurysmal           Other pertinent information:1. The past medical records were reviewed. 2.    Lab Results   Component Value Date    WBC 9.8 02/23/2021    HGB 8.0 (L) 02/23/2021    HCT 25.5 (L) 02/23/2021    MCV 81.5 02/23/2021     02/23/2021      Lab Results   Component Value Date     (L) 02/23/2021    K 4.0 02/23/2021    CL 98 02/23/2021    CO2 24 02/23/2021    BUN 12 02/23/2021    CREATININE 0.6 (L) 02/23/2021    GLUCOSE 137 (H) 02/23/2021    CALCIUM 9.2 02/23/2021    PROT 6.2 (L) 02/16/2021    LABALBU 2.7 (L) 02/16/2021    BILITOT 0.5 02/16/2021    ALKPHOS 87 02/16/2021    AST 26 02/16/2021    ALT 19 02/16/2021    LABGLOM >60 02/23/2021    GFRAA >60 02/23/2021     Lab Results   Component Value Date    APTT 34.1 11/15/2015      Lab Results   Component Value Date    INR 1.9 01/01/2021    INR 1.8 12/29/2020    INR 1.3 11/15/2015    PROTIME 21.8 (H) 01/01/2021    PROTIME 20.9 (H) 12/29/2020    PROTIME 13.5 (H) 11/15/2015        3.   I have reviewed the angiogram and intervention by his vascular surgeon Dr. Twan Garcia in February, same on the review the operation was done by Dr. Sukhdeep Carson regarding the debridement of the wound of the left heel    5. The MRI of the left foot revealed evidence of osteomyelitis acute of the posterior calcaneum       6. Patient also receiving antibiotics, under the supervision of her ID consultant at [facility]-    7. The lower extremity arterial Doppler study done in February 2021 was reviewed personally, however, no ankle-brachial was done post intervention to assess any improvement of the blood flow to the foot    Assessment:    1. Diabetic foot ulcer with osteomyelitis of calcaneum documented MRI of the foot done in May 2021, also history of necrosis of the muscle documented by the surgical notes and the operation was dictated in February 2021 by Dr. Sukhdeep Carson associated with femoral-popliteal arterial occlusive disease post angioplasty of the left superficial femoral artery and popliteal artery, done by his vascular surgeon Dr. Twan Garcia in February 2021 S. Linn Runner    2.   Diabetes with diabetic neuropathy, hypertension, hyperlipidemia        Patient Active Problem List   Diagnosis    CVA (cerebral vascular accident) ([address]    Diabetes mellitus ([address]    Diabetic foot infection ([address]    Hypertension    Hemiparesis affecting left side as late effect of cerebrovascular accident ([address]    Peripheral arterial disease ([address]    Urinary tract infection due to Proteus    Diabetic foot ulcer with osteomyelitis ([address]    Ulcer of left heel, with necrosis of muscle ([address]    Atherosclerosis of native artery of left lower extremity with ulceration of heel ([address]    Peripheral vascular angioplasty status            Plan:     I had a long and detailed discussion the patient, options, risks benefits and alternatives were explained to the patient, because of diabetic foot ulcer with osteomyelitis of calcaneum documented by MRI done in May 2021, also muscle necrosis document with surgical notes done earlier this year by patient's podiatrist Dr. Key Sebastian, it is felt, that patient would benefit from hyperbaric oxygen for treatment expected wound healing in collaboration with antibiotic therapy with ID consultants and local wound care by his podiatry service, who is also planning additional heel debridement including possible partial calcanectomy    Patient was recommended a repeat right carotid Doppler study for documented vascular status to see how much improvement he did after angioplasty of the left superficial femoral and popliteal by his vascular surgeon in [facility]    Also recommend chest x-ray      All the questions were answered.     I have also discussed with hyperbaric oxygen therapy personnel to proceed with treatment as soon as possible    Thank you for letting us participate in the care of your patient      Electronically signed by Tonia Gurrola MD on 12/21/2021 at 10:23 AM

## 2021-12-21 NOTE — PROGRESS NOTES
Hyperbaric evaluation for the treatment of [place] 3 DFU completed. Education and orientation materials were provided and explained to the patient with all questions answered. Chest Xray and vascular studies ordered by Dr Bernard Martinez. tcpO2 with O2 challenge completed with results posted to media tab.

## 2021-12-23 ENCOUNTER — TELEPHONE (OUTPATIENT)
Dept: HYPERBARIC MEDICINE | Age: 61
End: 2021-12-23

## 2021-12-23 NOTE — TELEPHONE ENCOUNTER
Mr Elsy Horowitz returned call for HBOT scheduling. Dr Bernard Martinez recommends that pt start HBOT as soon as possible to optimize wound healing. Mr Elsy Horowitz states that he has numerous appointments and transportation issues that will make it difficult to start HBOT before the new year. He wishes to tentatively start tx on the 10th of January 2022. HBOT dept will contact him to make specific tx arrangements after the new year and advised him to call with any issues. Rylee Daigle

## 2021-12-23 NOTE — TELEPHONE ENCOUNTER
Called Mr [facility] to schedule Hyperbaric Oxygen tx. Left message to call back dept at [phone].

## 2021-12-27 ENCOUNTER — HOSPITAL ENCOUNTER (OUTPATIENT)
Dept: WOUND CARE | Age: 61
Discharge: HOME | End: 2021-12-27
Payer: COMMERCIAL

## 2021-12-27 VITALS
BODY MASS INDEX: 29.63 KG/M2 | DIASTOLIC BLOOD PRESSURE: 70 MMHG | WEIGHT: 207 LBS | HEART RATE: 72 BPM | TEMPERATURE: 99.1 F | HEIGHT: 70 IN | SYSTOLIC BLOOD PRESSURE: 134 MMHG | RESPIRATION RATE: 16 BRPM

## 2021-12-27 DIAGNOSIS — L08.9 DIABETIC FOOT INFECTION (HCC): Primary | ICD-10-CM

## 2021-12-27 DIAGNOSIS — E11.628 DIABETIC FOOT INFECTION (HCC): Primary | ICD-10-CM

## 2021-12-27 PROBLEM — L97.424 ULCER OF LEFT HEEL, WITH NECROSIS OF BONE (HCC): Status: ACTIVE | Noted: 2021-12-27

## 2021-12-27 PROCEDURE — 11042 DBRDMT SUBQ TIS 1ST 20SQCM/<: CPT

## 2021-12-27 PROCEDURE — 6370000000 HC RX 637 (ALT 250 FOR IP): Performed by: PODIATRIST

## 2021-12-27 RX ORDER — LIDOCAINE 40 MG/G
CREAM TOPICAL ONCE
Status: CANCELLED | OUTPATIENT
Start: 2021-12-27 | End: 2021-12-27

## 2021-12-27 RX ORDER — LIDOCAINE 50 MG/G
OINTMENT TOPICAL ONCE
Status: CANCELLED | OUTPATIENT
Start: 2021-12-27 | End: 2021-12-27

## 2021-12-27 RX ORDER — LIDOCAINE HYDROCHLORIDE 40 MG/ML
SOLUTION TOPICAL ONCE
Status: COMPLETED | OUTPATIENT
Start: 2021-12-27 | End: 2021-12-27

## 2021-12-27 RX ORDER — LIDOCAINE HYDROCHLORIDE 40 MG/ML
SOLUTION TOPICAL ONCE
Status: CANCELLED | OUTPATIENT
Start: 2021-12-27 | End: 2021-12-27

## 2021-12-27 RX ORDER — LIDOCAINE HYDROCHLORIDE 20 MG/ML
JELLY TOPICAL ONCE
Status: CANCELLED | OUTPATIENT
Start: 2021-12-27 | End: 2021-12-27

## 2021-12-27 RX ORDER — BACITRACIN, NEOMYCIN, POLYMYXIN B 400; 3.5; 5 [USP'U]/G; MG/G; [USP'U]/G
OINTMENT TOPICAL ONCE
Status: CANCELLED | OUTPATIENT
Start: 2021-12-27 | End: 2021-12-27

## 2021-12-27 RX ORDER — BACITRACIN ZINC AND POLYMYXIN B SULFATE 500; 1000 [USP'U]/G; [USP'U]/G
OINTMENT TOPICAL ONCE
Status: CANCELLED | OUTPATIENT
Start: 2021-12-27 | End: 2021-12-27

## 2021-12-27 RX ORDER — CLOBETASOL PROPIONATE 0.5 MG/G
OINTMENT TOPICAL ONCE
Status: CANCELLED | OUTPATIENT
Start: 2021-12-27 | End: 2021-12-27

## 2021-12-27 RX ORDER — GINSENG 100 MG
CAPSULE ORAL ONCE
Status: CANCELLED | OUTPATIENT
Start: 2021-12-27 | End: 2021-12-27

## 2021-12-27 RX ORDER — GENTAMICIN SULFATE 1 MG/G
OINTMENT TOPICAL ONCE
Status: CANCELLED | OUTPATIENT
Start: 2021-12-27 | End: 2021-12-27

## 2021-12-27 RX ORDER — BETAMETHASONE DIPROPIONATE 0.05 %
OINTMENT (GRAM) TOPICAL ONCE
Status: CANCELLED | OUTPATIENT
Start: 2021-12-27 | End: 2021-12-27

## 2021-12-27 RX ADMIN — LIDOCAINE HYDROCHLORIDE: 40 SOLUTION TOPICAL at 13:00

## 2021-12-27 NOTE — PROGRESS NOTES
[facility]  History and Physical/Consultation  Podiatry    Referring Physician : Kalin Beckford MD  [id number] [facility] RECORD NUMBER:  [id number]  AGE: 57 y.o. GENDER: male  : 1960  EPISODE DATE:  2021  Subjective:     Chief Complaint   Patient presents with    Wound Check     left foot ulcer         HISTORY of PRESENT ILLNESS HPI     Emilia Balderrama is a 57 y.o. male who presents today for wound/ulcer evaluation. History of Wound Context:  The patient has had a wound of bilateral heels which was first noted approximately over a month ago. This has been treated with surgical debridement. On their initial visit to the wound healing center, 21 ,  the patient has noted that the wound has not been improving. The patient has had similar previous wounds in the past.      Pt is not on abx at time of initial visit. 3/22/21 - Wound VAC MWF continue to left heel. Aquacel Ag to right heel. Debrided toenails 1-5 in thickness and length. FU 1 week. IV Abx per Dr. Jaunita Spatz. 21 - Wound VAC MWF to left heel. Cultures obtained. FU 1 week after visit with Dr. Jaunita Spatz. PO Abx.  21 - DC wound VAC to left heel. Alginate to wound. FU 1 week  21 - Alginate and gentamicin ointment QOD. Partial WB to heel at 50% to foot with surgical shoe and walker. 5/3/21 - Alginate and gentamicin ointment QOD. MRI left heel    5/10/21 - Alginate and gentamicin ointment QOD. MRI left heel pending. 21 - Alginate and gentamicin ointment QOD. MRI reviewed confirming OM. Discussed HBOT referral and partial calcanectomy. He opts for HBOT consultation. 21 - Alginate and add gentamicin ointment QD. HBOT referral.  Patient hold off on partial calcanectomy. FU 1 week    21 - Alginate and add gentamicin ointment QD. HBOT referral.  Patient hold off on partial calcanectomy. FU 1 week     21 - Alginate and add gentamicin ointment QD.   HBOT referral. Patient hold off on partial calcanectomy. FU 1 week. Patient gave me consent (verbal) to speak with his brother Maryellen Banda regarding his at home arrangement. HBOT is on hold until patient is at home. 7/12/21 -  Alginate and add gentamicin ointment QD. HBOT referral.  Patient considering partial calcanectomy with flap. FU 1 week. 7/19/21 - Alginate and add gentamicin ointment QD. HBOT referral.  Patient considering partial calcanectomy with flap. FU 1 week. 8/2/21 - Alginate and add gentamicin ointment QD. HBOT referral.  Patient considering partial calcanectomy with flap. FU 1 week. 8/16/21 - Alginate and add gentamicin ointment QD. HBOT referral.  Patient considering partial calcanectomy with flap. FU 1 week. 8/23/21 - Alginate and add gentamicin ointment QD. HBOT referral.  Patient considering partial calcanectomy with flap. FU 1 week. 8/30/21 - Alginate and add gentamicin ointment QD. HBOT referral.  Patient considering partial calcanectomy with flap. FU 1 week. 9/20/21 - Alginate and add gentamicin ointment QD. HBOT referral.  Patient considering partial calcanectomy with flap. FU 1 week. 10/25/21 Alginate and add gentamicin ointment QD. HBOT referral.  Patient considering partial calcanectomy with flap. FU 1 week. 11/1/21 Alginate and add gentamicin ointment QD. HBOT referral.  Patient considering partial calcanectomy with flap. FU 1 week. 11/8/21 - Alginate and add gentamicin ointment QD. HBOT referral.  Patient considering partial calcanectomy with flap. FU 1 week. 11/15/21 - Alginate and add gentamicin ointment QD. HBOT referral.  Patient considering partial calcanectomy with flap. FU 1 week. 11/22/21 -  Alginate and add gentamicin ointment QD. HBOT referral.  Patient considering partial calcanectomy with flap. FU 1 week. 11/29/21 0 Alginate and gentamicin ointment. Possible DC home.   HBOT referral.  Patient considering partial calcanectomy with flap. FU 1 week. 12/6/21 - Alginate and gentamicin ointment. Possible DC home. HBOT referral.  Patient considering partial calcanectomy with flap. FU 1 week. 12/13/21 - Alginate and gentamicin ointment. DC home. HBOT referral.  [facility] referral for wound care and PT for GAIT training and safe DME use. Patient considering partial calcanectomy with flap. FU 1 week. 12/20/21 -  Alginate and gentamicin ointment. DC home. HBOT referral.    12/27/21 - HBOT consultation complete - to start HBOT after new year.      Wound/Ulcer Pain Timing/Severity: none  Quality of pain: N/A  Severity:  0 / 10   Modifying Factors: None  Associated Signs/Symptoms: none    Ulcer Identification:  Ulcer Type: arterial and diabetic  Contributing Factors: diabetes    Diabetic/Pressure/Non Pressure Ulcers onl y:  Ulcer: Diabetic ulcer, fat layer exposed    If patient has diabetic lower extremity wounds  Kong Classification of diabetic lower extremity wounds:    Grade Description   []  0 No open wound   []  1 Superficial ulcer involving the full skin thickness   [x]  2 Deep ulcer involves ligament, tendon, joint capsule, or fascia  No bone involvement or abscess presence   []  3 Deep Ulcer with abcess formation and/or osteomyelitis   []  4 Localized gangrene   []  5 Extensive gangrene of the foot     Wound: N/A        PAST MEDICAL HISTORY      Diagnosis Date    Cerebral artery occlusion with cerebral infarction ([address]     Diabetes mellitus ([address]     Type 2    Diabetic foot ulcer with osteomyelitis ([address] 12/21/2021    Hemiparesis affecting left side as late effect of cerebrovascular accident ([address]     LEFT SIDE NON DOMINANT FOLLOWING STROKE    Hypertension     Peripheral vascular angioplasty status 12/21/2021    Ulcer of left heel, with necrosis of bone ([address] 12/27/2021    Ulcer of left heel, with necrosis of muscle ([address] 12/21/2021     Past Surgical History:   Procedure Laterality Date    FINGER AMPUTATION      FOOT DEBRIDEMENT Left 2/16/2021    LEFT FOOT DEBRIDEMENT WITH BONE BIOPSY, WOUND VAC APPLICATION performed by Zenon Ortiz DPM at [place] [id number] ARTHROSCOPY      TONSILLECTOMY      TRANSESOPHAGEAL ECHOCARDIOGRAM N/A 2/22/2021    TRANSESOPHAGEAL ECHOCARDIOGRAM WITH BUBBLE STUDY performed by Lissa Kramer MD at [address] [facility] N/A 1/4/2021    EGD BIOPSY performed by Hiral Samson DO at [facility] reviewed. No pertinent family history. Social History     Tobacco Use    Smoking status: Former Smoker    Smokeless tobacco: Never Used   Vaping Use    Vaping Use: Never used   Substance Use Topics    Alcohol use: Not Currently     Comment: Former every day drinker for most of adult life    Drug use: No     Allergies   Allergen Reactions    Pcn [Penicillins]      Current Outpatient Medications on File Prior to Encounter   Medication Sig Dispense Refill    doxycycline hyclate (VIBRAMYCIN) 100 MG capsule Take 1 capsule by mouth 2 times daily 60 capsule 0    cefdinir (OMNICEF) 300 MG capsule Take 1 capsule by mouth 2 times daily for 21 days 42 capsule 0    diphenhydrAMINE (BENADRYL ALLERGY) 25 MG capsule Take 25 mg by mouth every 6 hours as needed for Itching      gabapentin (NEURONTIN) 100 MG capsule Take 100 mg by mouth 3 times daily.        B Complex-C-E-Zn (STRESS B/ZINC) TABS Take 1 tablet by mouth daily      acetaminophen (TYLENOL) 325 MG tablet Take 650 mg by mouth every 6 hours as needed for Pain      bisacodyl (DULCOLAX) 10 MG suppository Place 10 mg rectally daily      ferrous sulfate (IRON 325) 325 (65 Fe) MG tablet Take 325 mg by mouth daily (with breakfast)      vitamin D (ERGOCALCIFEROL) 1.25 MG (02843 UT) CAPS capsule Take 1 capsule by mouth once a week 5 capsule 0    ipratropium-albuterol (DUONEB) 0.5-2.5 (3) MG/3ML SOLN nebulizer solution Inhale 3 mLs into the lungs every 4 hours 360 mL 0    pantoprazole (PROTONIX) 40 MG tablet Take 1 tablet by mouth every morning (before breakfast) 30 tablet 3    melatonin 3 MG TABS tablet Take 9 mg by mouth nightly as needed      aspirin 81 MG EC tablet Take 81 mg by mouth daily      hydroCHLOROthiazide (HYDRODIURIL) 25 MG tablet Take 25 mg by mouth daily      metFORMIN (GLUCOPHAGE) 500 MG tablet Take 1 tablet by mouth 2 times daily (with meals) 60 tablet 3    atorvastatin (LIPITOR) 40 MG tablet Take 1 tablet by mouth nightly 30 tablet 3    clopidogrel (PLAVIX) 75 MG tablet Take 1 tablet by mouth daily 30 tablet 3    magnesium hydroxide (MILK OF MAGNESIA CONCENTRATE) 2400 MG/10ML SUSP Take 30 mLs by mouth daily as needed      glucose (GLUTOSE) 40 % GEL Take 37.5 mLs by mouth as needed (hypoglycemia) 45 g 1    glucagon, rDNA, 1 MG injection Inject 1 mg into the muscle as needed for Low blood sugar (Blood glucose less than 70 mg/dL and patient NOT ALERT or NPO and does not have IV access. ) 1 each 0     No current facility-administered medications on file prior to encounter.        REVIEW OF SYSTEMS   ROS : All others Negative if blank [], Positive if [x]  General Vascular   [] Fevers [] Claudication   [] Chills [] Rest Pain   Skin Neurologic   [x] Tissue Loss [x] Lower extremity neuropathy     Objective:    /70   Pulse 72   Temp 99.1 °F (37.3 °C) (Temporal)   Resp 16   Ht 5' 10\" (1.778 m)   Wt 207 lb (93.9 kg)   BMI 29.70 kg/m²   Wt Readings from Last 3 Encounters:   12/27/21 207 lb (93.9 kg)   12/20/21 207 lb (93.9 kg)   12/18/21 207 lb (93.9 kg)       PHYSICAL EXAM   CONSTITUTIONAL:   Awake, alert, cooperative   PSYCHIATRIC :  Oriented to time, place and person      normal insight to disease process  EXTREMITIES:   R LE Open wounds are noted   Skin color is normal   Edema is not noted   Sensation deficit noted - to foot   Palpation of the foot does cause pain   5/5 strength DF/PF  L LE Open wounds are noted   Skin color is abnormal with hyperpigmentation   Edema is not noted   Sensation deficit noted - bilateral feet   Palpation of the foot does cause pain   5/5 strength DF/PF  R dorsalis pedis +2 L dorsalis pedis +2   R posterior tibial +2 L posterior tibial +2     Assessment:     Problem List Items Addressed This Visit     Diabetic foot infection ([address] - Primary    Relevant Orders    Initiate Outpatient Wound Care Protocol          Pre Debridement Measurements:  Are located in the [place]  Documentation Flow Sheet  Post Debridement Measurements:  Wound/Ulcer Descriptions are Pre Debridement except measurements:     Wound 12/26/20 Arm Left (Active)   Number of days: 365       Wound 02/15/21 Heel Left (Active)   Number of days: 315       Wound 02/15/21 Heel Right (Active)   Number of days: 315       Wound 03/08/21 Heel Left #1 left heel aquired 12/1/20 (Active)   Wound Image   11/29/21 1504   Dressing Status New dressing applied;Clean;Dry; Intact 12/20/21 1624   Wound Cleansed Cleansed with saline 12/20/21 1624   Dressing/Treatment Alginate;ABD;Dry dressing 12/20/21 1624   Offloading for Diabetic Foot Ulcers Post op shoe 12/20/21 1624   Wound Length (cm) 4 cm 12/27/21 1309   Wound Width (cm) 3 cm 12/27/21 1309   Wound Depth (cm) 0.7 cm 12/27/21 1309   Wound Surface Area (cm^2) 12 cm^2 12/27/21 1309   Change in Wound Size % (l*w) 0.74 12/27/21 1309   Wound Volume (cm^3) 8.4 cm^3 12/27/21 1309   Wound Healing % 1 12/27/21 1309   Post-Procedure Length (cm) 4.1 cm 12/27/21 1329   Post-Procedure Width (cm) 3.1 cm 12/27/21 1329   Post-Procedure Depth (cm) 0.8 cm 12/27/21 1329   Post-Procedure Surface Area (cm^2) 12.71 cm^2 12/27/21 1329   Post-Procedure Volume (cm^3) 10.168 cm^3 12/27/21 1329   Undermining Starts ___ O'Clock 6 07/19/21 1054   Undermining Ends___ O'Clock 3 07/19/21 1054   Undermining Maxium Distance (cm) 0 10/04/21 1456   Wound Assessment Fibrin;Pale granulation tissue;Slough 12/27/21 1309   Drainage Amount Large 12/27/21 1309   Drainage Description Yellow; Thick 12/27/21 1309   Odor None 12/27/21 1309   Adrianne-wound Assessment Maceration 12/27/21 1309   Number of days: 294       Wound 03/08/21 Heel Right #2 rt heel aquired 12/1/20 (Active)   Wound Image   04/05/21 0929   Dressing Status New dressing applied 03/18/21 1352   Wound Cleansed Cleansed with saline 04/01/21 1536   Dressing/Treatment Alginate;Collagen;Silicone border 59/96/52 1536   Offloading for Diabetic Foot Ulcers Post op shoe 04/01/21 1536   Wound Length (cm) 0 cm 04/05/21 0929   Wound Width (cm) 0 cm 04/05/21 0929   Wound Depth (cm) 0 cm 04/05/21 0929   Wound Surface Area (cm^2) 0 cm^2 04/05/21 0929   Change in Wound Size % (l*w) 100 04/05/21 0929   Wound Volume (cm^3) 0 cm^3 04/05/21 0929   Wound Healing % 100 04/05/21 0929   Post-Procedure Length (cm) 0 cm 04/05/21 1011   Post-Procedure Width (cm) 0 cm 04/05/21 1011   Post-Procedure Depth (cm) 0 cm 04/05/21 1011   Post-Procedure Surface Area (cm^2) 0 cm^2 04/05/21 1011   Post-Procedure Volume (cm^3) 0 cm^3 04/05/21 1011   Wound Assessment Fibrin 04/01/21 1357   Drainage Amount None 04/01/21 1357   Drainage Description Yellow 03/22/21 0911   Odor None 04/01/21 1357   Adrianne-wound Assessment Maceration 04/01/21 1357   Number of days: 294     Incision 02/19/21 Groin Right (Active)   Number of days: 311       Procedure Note  Indications:  Based on my examination of this patient's wound(s)/ulcer(s) today, debridement is required to promote healing and evaluate the wound base. Performed by: Tanya Higgins DPM    Consent obtained:  Yes    Time out taken:  Yes    Pain Control: Anesthetic  Anesthetic: 4% Lidocaine Liquid Topical     Debridement:Excisional Debridement    Using curette the wound(s)/ulcer(s) was/were sharply debrided down through and including the removal of subcutaneous tissue.         Devitalized Tissue Debrided:  slough to stimulate bleeding to promote healing, post debridement good bleeding base and wound edges noted    Wound/Ulcer #: 2    Percent of Wound/Ulcer Debrided: 100%    Total Surface Area Debrided:  12.71 sq cm     Estimated Blood Loss:  None  Hemostasis Achieved:  by pressure    Procedural Pain:  2  / 10   Post Procedural Pain:  2 / 10     Response to treatment:  Well tolerated by patient. A culture was done. Plan:     Pt is a smoker   - Discussed relationship of smoking and negative affects on wound healing   - Emphasized importance of tobacco avoidace/cessation   - Script for nicotine patch given to patient   - Information regarding support groups for smoking cessation given    In my professional opinion and based off the information that is available at this time this patient has appropriate indication for HBO Therapy: Maybe    Treatment Note please see attached Discharge Instructions    Written patient dismissal instructions given to patient and signed by patient or POA.            Electronically signed by Abby Rosado DPM on 12/27/2021 at 1:55 PM

## 2021-12-30 ENCOUNTER — TELEPHONE (OUTPATIENT)
Dept: HYPERBARIC MEDICINE | Age: 61
End: 2021-12-30

## 2021-12-30 NOTE — TELEPHONE ENCOUNTER
Per Mr Roberto Bernard' request, arrangements were made with [employer] transportation to transport him to [facility] Monday thru Friday beginning 1-10-22 to arrive not later than 0930 daily. Confirmation [id number]. Called Mr Roberto Bernard to notify of arrangements.

## 2022-01-03 ENCOUNTER — HOSPITAL ENCOUNTER (OUTPATIENT)
Dept: WOUND CARE | Age: 62
Discharge: HOME | End: 2022-01-03

## 2022-01-06 ENCOUNTER — HOSPITAL ENCOUNTER (OUTPATIENT)
Dept: WOUND CARE | Age: 62
Discharge: HOME | End: 2022-01-06
Payer: COMMERCIAL

## 2022-01-06 VITALS
RESPIRATION RATE: 16 BRPM | TEMPERATURE: 96.7 F | HEART RATE: 76 BPM | SYSTOLIC BLOOD PRESSURE: 130 MMHG | DIASTOLIC BLOOD PRESSURE: 70 MMHG

## 2022-01-06 DIAGNOSIS — L08.9 DIABETIC FOOT INFECTION (HCC): Primary | ICD-10-CM

## 2022-01-06 DIAGNOSIS — E11.628 DIABETIC FOOT INFECTION (HCC): Primary | ICD-10-CM

## 2022-01-06 PROCEDURE — 99213 OFFICE O/P EST LOW 20 MIN: CPT

## 2022-01-06 PROCEDURE — 6370000000 HC RX 637 (ALT 250 FOR IP): Performed by: PODIATRIST

## 2022-01-06 RX ORDER — LIDOCAINE HYDROCHLORIDE 40 MG/ML
SOLUTION TOPICAL ONCE
Status: CANCELLED | OUTPATIENT
Start: 2022-01-06 | End: 2022-01-06

## 2022-01-06 RX ORDER — LIDOCAINE 50 MG/G
OINTMENT TOPICAL ONCE
Status: CANCELLED | OUTPATIENT
Start: 2022-01-06 | End: 2022-01-06

## 2022-01-06 RX ORDER — CLOBETASOL PROPIONATE 0.5 MG/G
OINTMENT TOPICAL ONCE
Status: CANCELLED | OUTPATIENT
Start: 2022-01-06 | End: 2022-01-06

## 2022-01-06 RX ORDER — LIDOCAINE 40 MG/G
CREAM TOPICAL ONCE
Status: CANCELLED | OUTPATIENT
Start: 2022-01-06 | End: 2022-01-06

## 2022-01-06 RX ORDER — LIDOCAINE HYDROCHLORIDE 20 MG/ML
JELLY TOPICAL ONCE
Status: CANCELLED | OUTPATIENT
Start: 2022-01-06 | End: 2022-01-06

## 2022-01-06 RX ORDER — LIDOCAINE HYDROCHLORIDE 40 MG/ML
SOLUTION TOPICAL ONCE
Status: COMPLETED | OUTPATIENT
Start: 2022-01-06 | End: 2022-01-06

## 2022-01-06 RX ORDER — BACITRACIN ZINC AND POLYMYXIN B SULFATE 500; 1000 [USP'U]/G; [USP'U]/G
OINTMENT TOPICAL ONCE
Status: CANCELLED | OUTPATIENT
Start: 2022-01-06 | End: 2022-01-06

## 2022-01-06 RX ORDER — BETAMETHASONE DIPROPIONATE 0.05 %
OINTMENT (GRAM) TOPICAL ONCE
Status: CANCELLED | OUTPATIENT
Start: 2022-01-06 | End: 2022-01-06

## 2022-01-06 RX ORDER — GINSENG 100 MG
CAPSULE ORAL ONCE
Status: CANCELLED | OUTPATIENT
Start: 2022-01-06 | End: 2022-01-06

## 2022-01-06 RX ORDER — BACITRACIN, NEOMYCIN, POLYMYXIN B 400; 3.5; 5 [USP'U]/G; MG/G; [USP'U]/G
OINTMENT TOPICAL ONCE
Status: CANCELLED | OUTPATIENT
Start: 2022-01-06 | End: 2022-01-06

## 2022-01-06 RX ORDER — GENTAMICIN SULFATE 1 MG/G
OINTMENT TOPICAL ONCE
Status: CANCELLED | OUTPATIENT
Start: 2022-01-06 | End: 2022-01-06

## 2022-01-06 RX ADMIN — LIDOCAINE HYDROCHLORIDE 3 ML: 40 SOLUTION TOPICAL at 13:33

## 2022-01-06 ASSESSMENT — PAIN DESCRIPTION - PROGRESSION: CLINICAL_PROGRESSION: NOT CHANGED

## 2022-01-06 ASSESSMENT — PAIN DESCRIPTION - FREQUENCY: FREQUENCY: INTERMITTENT

## 2022-01-06 ASSESSMENT — PAIN DESCRIPTION - LOCATION: LOCATION: FOOT

## 2022-01-06 ASSESSMENT — PAIN DESCRIPTION - DESCRIPTORS: DESCRIPTORS: BURNING

## 2022-01-06 ASSESSMENT — PAIN DESCRIPTION - ORIENTATION: ORIENTATION: LEFT;POSTERIOR

## 2022-01-06 ASSESSMENT — PAIN DESCRIPTION - ONSET: ONSET: PROGRESSIVE

## 2022-01-06 ASSESSMENT — PAIN SCALES - GENERAL: PAINLEVEL_OUTOF10: 3

## 2022-01-06 ASSESSMENT — PAIN DESCRIPTION - PAIN TYPE: TYPE: CHRONIC PAIN

## 2022-01-06 ASSESSMENT — PAIN - FUNCTIONAL ASSESSMENT: PAIN_FUNCTIONAL_ASSESSMENT: ACTIVITIES ARE NOT PREVENTED

## 2022-01-06 NOTE — PLAN OF CARE
Problem: Pain:  Goal: Pain level will decrease  Description: Pain level will decrease  Outcome: Met This Shift  Goal: Control of chronic pain  Description: Control of chronic pain  Outcome: Met This Shift     Problem: Wound:  Goal: Will show signs of wound healing; wound closure and no evidence of infection  Description: Will show signs of wound healing; wound closure and no evidence of infection  Outcome: Met This Shift     Problem: Blood Glucose:  Goal: Ability to maintain appropriate glucose levels will improve  Description: Ability to maintain appropriate glucose levels will improve  Outcome: Met This Shift

## 2022-01-06 NOTE — PROGRESS NOTES
[facility] Center Followup Visit Note  Referring Physician : Suyapa Fernandez MD  [address] [facility] RECORD NUMBER:  [id number]  AGE: 57 y.o. GENDER: male  : 1960  EPISODE DATE:  2022  Subjective:     Chief Complaint   Patient presents with    Wound Check     wound left heel      HISTORY of PRESENT ILLNESS HPI   Alejandro Nava is a 57 y.o. male who presents today in regards to follow up evaluation and treatment of wound/ulcer. That patient's past medical, family and social hx were reviewed and changes were made if present. History of Wound Context:  Pt is known to ID s/p D/c from [facility] on  2021  Came in with 1. Left heel ulceration and necrosis of muscle. 2.  Peripheral arterial disease. 3. Acute osteomyelitis, left foot. 4. Cellulitis, left foot with abscess.     Current visit:  22   Pt has no c/o   On atbx  No f/c/n/v/d    2021   Pt doing well no new issues he is at home    10/28/2021  Currently wound bed looks healthy    wound cx vse/corynebacterium  On cipro/doxy    7/15/2021  NAD NO ISSUES WITH ATBX CIPRO/DOXY   LEFT HEEL WOUND SAME NO SIGNS OF INFECTION     2021  Has no c/o  On doxy  Was supposed to be on cipro also   Using gent ointment   vacc off    6/3/2021  A wound culture SHOWED MRSA  MRI SHOWED ACUTE OM/MYOSITIS/CELLULITS  FINISHED  LINEZOLID 600MG PO Q12  ON DOXY   RECX   NO F/C/N/V/D/  50% WEIGHT BEARING    2021  IN WC NAD NO C/O  NO PAIN LE   MRI NOTED   ON DOXY     2021  Still at ecf on doxycycline he has no isues with it  Left heel has some pain  He is asking about more weight bearing     Sp vancomycin (VANCOCIN) 1,000 mg  Q12H can stop today/2021  Here for f/u left post heel ulcer   Has wound vacc   Currently off has bone palpable  periwound inatct   has left >right ble swelling   No calf pain   Has no c/o  Rue picc without swelling or pain  Tolerating doxycyline informed side effects of photosensitivity     Bilateral iliac angiogram, left femoral  angiography, nitroglycerin infusion left common femoral artery 1000 mcg  x2 boluses, right common femoral artery to left popliteal artery  catheterization, PTA of the left SFA and popliteal artery with 4 x 80  RapidCross balloon, 5 x 300 Saber balloon, 5 x 220 Powerflex balloon, 6  x 100 Powerflex balloon and 6 x 250 IN. PACT Admiral balloon, completion  left femoral angiography, ProGlide closure right common femoral artery  access site. 2/16 1. Excision and debridement of the left heel ulceration to and through  level of deep fascia and muscle. Total measurement is 4.5 cm x 6.0 cm x  0.5 cm in dimension. 2.  Incision and drainage, left foot abscess. 3.  Bone biopsy, left foot. 4.  Application of wound VAC negative pressure therapy.   cx Enterococcus  Path Bone biopsy left heel: Medullary bone, negative for osteomyelitis.     Most Recent   Organism   Date Value Ref Range Status   11/29/2021 Enterobacter cloacae complex (A)  Final   11/29/2021 Staphylococcus haemolyticus (A)  Final   11/29/2021 Corynebacterium species (A)  Final   Susceptibility     Enterobacter cloacae complex Staphylococcus haemolyticus    BACTERIAL SUSCEPTIBILITY PANEL BY ANNELIESE BACTERIAL SUSCEPTIBILITY PANEL BY ANNELIESE    aztreonam <=^1 mcg/mL Sensitive      ceFAZolin >=^64 mcg/mL Resistant      cefepime ^1 mcg/mL Sensitive      cefotaxime ^0.5 mcg/mL Sensitive      cefoTEtan <=^4 mcg/mL Resistant      cefOXitin >=^64 mcg/mL Resistant      cefpodoxime proxetil ^2 mcg/mL Sensitive      Ceftolozane/Tazobactam (Zerbaxa) <=^0.25 mcg/mL Sensitive      cefuroxime ^32 mcg/mL Resistant      cephalothin >=^64 mcg/mL Resistant      clindamycin   <=^0.12 mcg/mL Sensitive    DAPTOmycin   ^0.5 mcg/mL Sensitive    doripenem <=^0.12 mcg/mL Sensitive      doxycycline   >=^16 mcg/mL Resistant    erythromycin   >=^8 mcg/mL Resistant    gentamicin >=^16 mcg/mL Resistant ^4 mcg/mL Sensitive    levofloxacin >=^8 mcg/mL Resistant      meropenem <=^0.25 mcg/mL Sensitive      oxacillin   >=^4 mcg/mL Resistant    piperacillin-tazobactam ^16 mcg/mL Sensitive      tobramycin >=^16 mcg/mL Resistant      trimethoprim-sulfamethoxazole ^40 mcg/mL Sensitive >=^320 mcg/mL Resistant    vancomycin   ^1 mcg/mL Sensitive                PAST MEDICAL HISTORY      Diagnosis Date    Cerebral artery occlusion with [facility] infarction [facility]     Diabetes mellitus ([facility]     Type 2    Diabetic foot ulcer with osteomyelitis ([address] 12/21/2021    Hemiparesis affecting left side as late effect of cerebrovascular accident ([address]     LEFT SIDE NON DOMINANT FOLLOWING STROKE    Hypertension     Peripheral vascular angioplasty status 12/21/2021    Ulcer of left heel, with necrosis of bone ([facility] 12/27/2021    Ulcer of left heel, with necrosis of muscle ([facility] 12/21/2021     Past Surgical History:   Procedure Laterality Date    FINGER AMPUTATION      FOOT DEBRIDEMENT Left 2/16/2021    LEFT FOOT DEBRIDEMENT WITH BONE BIOPSY, WOUND VAC APPLICATION performed by Thony Garcia DPM at [place] [address] ARTHROSCOPY      TONSILLECTOMY      TRANSESOPHAGEAL ECHOCARDIOGRAM N/A 2/22/2021    TRANSESOPHAGEAL ECHOCARDIOGRAM WITH BUBBLE STUDY performed by Yuki Lr MD at [address] [place] [address],[address] N/A 1/4/2021    EGD BIOPSY performed by Aura Simon DO at [place] [address] reviewed. No pertinent family history.   Social History     Tobacco Use    Smoking status: Former Smoker    Smokeless tobacco: Never Used   Vaping Use    Vaping Use: Never used   Substance Use Topics    Alcohol use: Not Currently     Comment: Former every day drinker for most of adult life    Drug use: No     Allergies   Allergen Reactions    Pcn [Penicillins]      Current Outpatient Medications on File Prior to Encounter   Medication Sig Dispense Refill    doxycycline hyclate (VIBRAMYCIN) 100 MG capsule Take 1 capsule by mouth 2 times daily 60 capsule 0  cefdinir (OMNICEF) 300 MG capsule Take 1 capsule by mouth 2 times daily for 21 days 42 capsule 0    gabapentin (NEURONTIN) 100 MG capsule Take 100 mg by mouth 3 times daily.  B Complex-C-E-Zn (STRESS B/ZINC) TABS Take 1 tablet by mouth daily      acetaminophen (TYLENOL) 325 MG tablet Take 650 mg by mouth every 6 hours as needed for Pain      ferrous sulfate (IRON 325) 325 (65 Fe) MG tablet Take 325 mg by mouth daily (with breakfast)      vitamin D (ERGOCALCIFEROL) 1.25 MG (39949 UT) CAPS capsule Take 1 capsule by mouth once a week 5 capsule 0    pantoprazole (PROTONIX) 40 MG tablet Take 1 tablet by mouth every morning (before breakfast) 30 tablet 3    melatonin 3 MG TABS tablet Take 9 mg by mouth nightly as needed      aspirin 81 MG EC tablet Take 81 mg by mouth daily      hydroCHLOROthiazide (HYDRODIURIL) 25 MG tablet Take 25 mg by mouth daily      metFORMIN (GLUCOPHAGE) 500 MG tablet Take 1 tablet by mouth 2 times daily (with meals) 60 tablet 3    atorvastatin (LIPITOR) 40 MG tablet Take 1 tablet by mouth nightly 30 tablet 3    clopidogrel (PLAVIX) 75 MG tablet Take 1 tablet by mouth daily 30 tablet 3    diphenhydrAMINE (BENADRYL ALLERGY) 25 MG capsule Take 25 mg by mouth every 6 hours as needed for Itching      bisacodyl (DULCOLAX) 10 MG suppository Place 10 mg rectally daily      magnesium hydroxide (MILK OF MAGNESIA CONCENTRATE) 2400 MG/10ML SUSP Take 30 mLs by mouth daily as needed      glucose (GLUTOSE) 40 % GEL Take 37.5 mLs by mouth as needed (hypoglycemia) 45 g 1    glucagon, rDNA, 1 MG injection Inject 1 mg into the muscle as needed for Low blood sugar (Blood glucose less than 70 mg/dL and patient NOT ALERT or NPO and does not have IV access. ) 1 each 0     No current facility-administered medications on file prior to encounter.      REVIEW OF SYSTEMS See HPI  Objective:    /70   Pulse 76   Temp 96.7 °F (35.9 °C) (Temporal)   Resp 16   Wt Readings from Last 3 Encounters: 12/27/21 207 lb (93.9 kg)   12/20/21 207 lb (93.9 kg)   12/18/21 207 lb (93.9 kg)     PHYSICAL EXAM  CONSTITUTIONAL:   nad  HEENT: AT/NC glasses  HEART: S1/S2  RS: CTAB  Post   ABD: SOFT NT/ND    EXT:  Trace EDEMA, left 2nd finger amp  SKIN: Open wound Present post heel wound bed  No change and deep  Psych pleasant   Wound Care Documentation:  Wound 12/26/20 Arm Left (Active)   Number of days: 305       Wound 02/15/21 Heel Left (Active)   Number of days: 255       Wound 02/15/21 Heel Right (Active)   Number of days: 255       Wound 03/08/21 Heel Left #1 left heel aquired 12/1/20 (Active)   Wound Image   10/04/21 1456   Dressing Status New dressing applied 10/25/21 1609   Wound Cleansed Cleansed with saline 10/25/21 1609   Dressing/Treatment Alginate;ABD;Dry dressing;Roll gauze 10/25/21 1609   Offloading for Diabetic Foot Ulcers Post op shoe 10/25/21 1609   Wound Length (cm) 4.4 cm 10/25/21 1551   Wound Width (cm) 2.8 cm 10/25/21 1551   Wound Depth (cm) 0.7 cm 10/25/21 1551   Wound Surface Area (cm^2) 12.32 cm^2 10/25/21 1551   Change in Wound Size % (l*w) -1.9 10/25/21 1551   Wound Volume (cm^3) 8.624 cm^3 10/25/21 1551   Wound Healing % -2 10/25/21 1551   Post-Procedure Length (cm) 4.4 cm 10/25/21 1609   Post-Procedure Width (cm) 2.9 cm 10/25/21 1609   Post-Procedure Depth (cm) 0.8 cm 10/25/21 1609   Post-Procedure Surface Area (cm^2) 12.76 cm^2 10/25/21 1609   Post-Procedure Volume (cm^3) 10.208 cm^3 10/25/21 1609   Undermining Starts ___ O'Clock 6 07/19/21 1054   Undermining Ends___ O'Clock 3 07/19/21 1054   Undermining Maxium Distance (cm) 0 10/04/21 1456   Wound Assessment Pale granulation tissue;Fibrin 10/25/21 1551   Drainage Amount Large 10/25/21 1551   Drainage Description Yellow 10/25/21 1551   Odor None 10/25/21 1551   Adrianne-wound Assessment Maceration 10/25/21 1551   Number of days: 234       Wound 03/08/21 Heel Right #2 rt heel aquired 12/1/20 (Active)   Wound Image   04/05/21 0929   Dressing Status New dressing applied 03/18/21 1352   Wound Cleansed Cleansed with saline 04/01/21 1536   Dressing/Treatment Alginate;Collagen;Silicone border 10/17/50 1536   Offloading for Diabetic Foot Ulcers Post op shoe 04/01/21 1536   Wound Length (cm) 0 cm 04/05/21 0929   Wound Width (cm) 0 cm 04/05/21 0929   Wound Depth (cm) 0 cm 04/05/21 0929   Wound Surface Area (cm^2) 0 cm^2 04/05/21 0929   Change in Wound Size % (l*w) 100 04/05/21 0929   Wound Volume (cm^3) 0 cm^3 04/05/21 0929   Wound Healing % 100 04/05/21 0929   Post-Procedure Length (cm) 0 cm 04/05/21 1011   Post-Procedure Width (cm) 0 cm 04/05/21 1011   Post-Procedure Depth (cm) 0 cm 04/05/21 1011   Post-Procedure Surface Area (cm^2) 0 cm^2 04/05/21 1011   Post-Procedure Volume (cm^3) 0 cm^3 04/05/21 1011   Wound Assessment Fibrin 04/01/21 1357   Drainage Amount None 04/01/21 1357   Drainage Description Yellow 03/22/21 0911   Odor None 04/01/21 1357   Adrianne-wound Assessment Maceration 04/01/21 1357   Number of days: 234       Assessment:   S/p rx MSSA bacteremia   BILATERAL HEEL PRESSURE ULCERS   NECROTIC WOUND, OSTEOMYELITIS, Cellulitis left foot with palpable bone   S/p LEFT FOOT DEBRIDEMENT WITH BONE BIOPSY, WOUND VAC APPLICATION  Bone biopsy left heel: Medullary bone, negative for osteomyelitis. S/p angio    UTI  - proteus s/p rx with cefepime  H/o vitamin D deficiency on suppleemnts    2/14 wound cx Mixed Gram positive organisms   Proteus species   TTE Summary No vegetations seen otherwise.       Plan:   Cont  Doxycycline/omnicef   Consider VACC  For hbot  He completed cipro   can f/u 4-6 weeks    CAN DO CBC/CMP/ESR/CRP EVERY 2 WEEKS     WOUND CARE GENT CREAM/ALGINATE ABD KERLEX  allergic to pcn   F/u 4 weeks     Orders Placed This Encounter   Procedures    Initiate Outpatient Wound Care Protocol     Orders Placed This Encounter   Medications    lidocaine (XYLOCAINE) 4 % external solution       Plan:   Treatment Note please see attached Discharge Instructions    Written patient dismissal instructions given to patient and signed by patient or POA. Discharge Instructions        Discharge condition: Stable     Assessment of pain at discharge: moderate     Anesthetic used: 4% liquid lidocaine solution      Discharge to: ECF     Left via:ambulance     Accompanied by: self     [facility]      Dressing Orders:  Cleanse left heel ulcer with normal saline solution apply first gentamicin 0.1% ointment and cover with plain alginate and dry dressing and change every other day or as needed for drainage.  .      Treatment Orders:    Continue antibiotics per Dr Vamshi Wheatley and Cipro for suppression.  Wear prevalon boots or heel ayo while in bed.          May be partial weight bearing up to 50% on left with surgical shoe.     HBO consult ordered     Consider surgical debridement of infected bone in heel with skin graft.     Start PT evaluate and treat as indicated three times a week. Dr Indu Castillo suggesting wound vac while in HBO?     [facility] followup visit: ______1 month dr perales_________________ 1 weeks Dr Harrell____________________________  (Please note your next appointment above and if you are unable to keep, kindly give a 24 hour notice.  Thank you.)     Physician signature:__________________________        If you experience any of the following, please call the [address] [place] [address] during business hours:     * Increase in Pain  * Temperature over 101  * Increase in drainage from your wound  * Drainage with a foul odor  * Bleeding  * Increase in swelling  * Need for compression bandage changes due to slippage, breakthrough drainage.     If you need medical attention outside of the business hours of the [address] [place] [address] please contact your PCP or go to the nearest emergency room.                                                                                            Electronically signed by Rima Adamson MD on 1/6/2022 at 2:30 PM

## 2022-01-11 ENCOUNTER — HOSPITAL ENCOUNTER (OUTPATIENT)
Dept: HYPERBARIC MEDICINE | Age: 62
Setting detail: THERAPIES SERIES
Discharge: HOME | End: 2022-01-11
Payer: COMMERCIAL

## 2022-01-11 NOTE — PROGRESS NOTES
Called patient at this time in an attempt to determine when patient plans to initiate HBO treatment, and top remind patient that he is still in need of CXR prior to beginning treatment. No answer, left HIPPA compliant , awaiting return call.

## 2022-01-14 ENCOUNTER — TELEPHONE (OUTPATIENT)
Dept: HYPERBARIC MEDICINE | Age: 62
End: 2022-01-14

## 2022-01-14 NOTE — TELEPHONE ENCOUNTER
Called Mr Ramón Wright to inquire about his intention to start hyperbaric oxygen treatment for his diabetic foot wound. Mr Ramón Wright was scheduled to begin treatment on 1-10-22 but was no call/no show for the entire week. Multiple calls were placed to Mr Ramón Wright and messages left with no return calls received. This department was able to reach Mr Ramón Wright today 1-14-21. He states that he would like to start treatment on 1-24-22 at 10 am.  I advised Mr Ramón Wright that other patients were waiting to receive treatment and that his treatment slot would be given to another patient on the waiting list if he failed to keep the agreed appointment.

## 2022-01-24 ENCOUNTER — HOSPITAL ENCOUNTER (OUTPATIENT)
Dept: HYPERBARIC MEDICINE | Age: 62
Setting detail: THERAPIES SERIES
Discharge: HOME | End: 2022-01-24
Payer: COMMERCIAL

## 2022-01-24 VITALS
TEMPERATURE: 97.1 F | SYSTOLIC BLOOD PRESSURE: 132 MMHG | DIASTOLIC BLOOD PRESSURE: 68 MMHG | RESPIRATION RATE: 18 BRPM | HEART RATE: 88 BPM

## 2022-01-24 LAB
METER GLUCOSE: 141 MG/DL (ref 74–99)
METER GLUCOSE: 81 MG/DL (ref 74–99)

## 2022-01-24 PROCEDURE — G0277 HBOT, FULL BODY CHAMBER, 30M: HCPCS

## 2022-01-24 PROCEDURE — 99183 HYPERBARIC OXYGEN THERAPY: CPT | Performed by: SURGERY

## 2022-01-24 PROCEDURE — 82962 GLUCOSE BLOOD TEST: CPT

## 2022-01-25 ENCOUNTER — HOSPITAL ENCOUNTER (OUTPATIENT)
Dept: HYPERBARIC MEDICINE | Age: 62
Setting detail: THERAPIES SERIES
Discharge: HOME | End: 2022-01-25
Payer: COMMERCIAL

## 2022-01-25 VITALS
TEMPERATURE: 97 F | SYSTOLIC BLOOD PRESSURE: 168 MMHG | HEART RATE: 87 BPM | DIASTOLIC BLOOD PRESSURE: 76 MMHG | RESPIRATION RATE: 16 BRPM

## 2022-01-25 DIAGNOSIS — L97.424 ULCER OF LEFT HEEL, WITH NECROSIS OF BONE (HCC): Primary | ICD-10-CM

## 2022-01-25 LAB
METER GLUCOSE: 130 MG/DL (ref 74–99)
METER GLUCOSE: 157 MG/DL (ref 74–99)

## 2022-01-25 PROCEDURE — G0277 HBOT, FULL BODY CHAMBER, 30M: HCPCS

## 2022-01-25 PROCEDURE — 99183 HYPERBARIC OXYGEN THERAPY: CPT | Performed by: SURGERY

## 2022-01-25 PROCEDURE — 82962 GLUCOSE BLOOD TEST: CPT

## 2022-01-25 NOTE — PROGRESS NOTES
RN HYPERBARIC OXYGEN THERAPY RISK ASSESSMENT TOOL   [facility]     Stephane Lara  MEDICAL RECORD NUMBER:  [id number]  AGE: 57 y.o. GENDER: male  : 1960  EPISODE DATE:  2022       PAST MEDICAL HISTORY      Diagnosis Date    Cerebral artery occlusion with cerebral infarction ([address]     Diabetes mellitus ([address]     Type 2    Diabetic foot ulcer with osteomyelitis ([address] 2021    Hemiparesis affecting left side as late effect of cerebrovascular accident ([address]     LEFT SIDE NON DOMINANT FOLLOWING STROKE    Hypertension     Peripheral vascular angioplasty status 2021    Ulcer of left heel, with necrosis of bone [facility] 2021    Ulcer of left heel, with necrosis of muscle ([facility] 2021       PAST SURGICAL HISTORY  Past Surgical History:   Procedure Laterality Date    FINGER AMPUTATION      FOOT DEBRIDEMENT Left 2021    LEFT FOOT DEBRIDEMENT WITH BONE BIOPSY, WOUND VAC APPLICATION performed by Iris Pearson DPM at [place] [address] ARTHROSCOPY      TONSILLECTOMY      TRANSESOPHAGEAL ECHOCARDIOGRAM N/A 2021    TRANSESOPHAGEAL ECHOCARDIOGRAM WITH BUBBLE STUDY performed by Isaiah Alfaro MD at [facility] N/A 2021    EGD BIOPSY performed by Donald Irving DO at [place] [address]  History reviewed. No pertinent family history.     SOCIAL HISTORY  Social History     Tobacco Use    Smoking status: Former Smoker    Smokeless tobacco: Never Used   Vaping Use    Vaping Use: Never used   Substance Use Topics    Alcohol use: Not Currently     Comment: Former every day drinker for most of adult life    Drug use: No       ALLERGIES  Allergies   Allergen Reactions    Pcn [Penicillins]        MEDICATIONS  Current Outpatient Medications on File Prior to Encounter   Medication Sig Dispense Refill    diphenhydrAMINE (BENADRYL ALLERGY) 25 MG capsule Take 25 mg by mouth every 6 hours as needed for Itching      gabapentin (NEURONTIN) 100 MG capsule Take 100 mg by mouth 3 times daily.  B Complex-C-E-Zn (STRESS B/ZINC) TABS Take 1 tablet by mouth daily      acetaminophen (TYLENOL) 325 MG tablet Take 650 mg by mouth every 6 hours as needed for Pain      bisacodyl (DULCOLAX) 10 MG suppository Place 10 mg rectally daily      ferrous sulfate (IRON 325) 325 (65 Fe) MG tablet Take 325 mg by mouth daily (with breakfast)      magnesium hydroxide (MILK OF MAGNESIA CONCENTRATE) 2400 MG/10ML SUSP Take 30 mLs by mouth daily as needed      glucose (GLUTOSE) 40 % GEL Take 37.5 mLs by mouth as needed (hypoglycemia) 45 g 1    glucagon, rDNA, 1 MG injection Inject 1 mg into the muscle as needed for Low blood sugar (Blood glucose less than 70 mg/dL and patient NOT ALERT or NPO and does not have IV access. ) 1 each 0    vitamin D (ERGOCALCIFEROL) 1.25 MG (17434 UT) CAPS capsule Take 1 capsule by mouth once a week 5 capsule 0    pantoprazole (PROTONIX) 40 MG tablet Take 1 tablet by mouth every morning (before breakfast) 30 tablet 3    melatonin 3 MG TABS tablet Take 9 mg by mouth nightly as needed      aspirin 81 MG EC tablet Take 81 mg by mouth daily      hydroCHLOROthiazide (HYDRODIURIL) 25 MG tablet Take 25 mg by mouth daily      metFORMIN (GLUCOPHAGE) 500 MG tablet Take 1 tablet by mouth 2 times daily (with meals) 60 tablet 3    atorvastatin (LIPITOR) 40 MG tablet Take 1 tablet by mouth nightly 30 tablet 3    clopidogrel (PLAVIX) 75 MG tablet Take 1 tablet by mouth daily 30 tablet 3     No current facility-administered medications on file prior to encounter. LABS  HgBA1c:    Lab Results   Component Value Date    LABA1C 6.8 02/14/2021            Please add comments to any \"yes\" answers.     Do you have a history of: Comments   Seizure no   Congenital spherocytosis no   Optic Neuritis no   Cataracts no   Eye Surgery no   Ear problems no   Ear reconstructive surgery no   Sinus problems no Asthma no   Bronchitis no   Emphysema no   Pneumothorax no   Tuberculosis no   Other lung problems no   Hypertension yes   Pacemaker/AICD no   Congestive heart failure no   EF% >30% yes   Any implanted device no   Dialysis no   Current pregnancy no   Claustrophobia no   Diabetes yes   Currently using these medications:     a. Disulfiram (Antabuse®) no    b. Mafenide acetate        (Sulfamylon®) no    c.  Diuretics for CHF no    d. Amiodarone dose > 400mg/day no    e. Lanoxin/Digoxin no    f. Current Steroid use     no   Cancer:  no    a. Surgery for Cancer no    b. Radiation therapy no    c. Chemotherapy no    If yes, did you receive:     a. Doxorubicin (Adriamycin®) no    b. Cisplatin (Platinol AQ®) no    c.  Bleomycin (Blenoxane®) no     The above was reviewed with: Patient    Electronically signed by Julius Seo RN on 1/25/2022 at 3:22 PM   Fazal Sykes is a 57 y.o. male has been receiving hyperbaric oxygen treatment for management of: Indication  Indications: Lower Extremity Diabetic Wound ___(site) (left heel)  Hayes November: Esqueda November 3.  Mr. Eugenio Lynn has completed Treatment Number: 2 out of a treatment protocol of Total Treatments: 30.    Problem List:  Patient Active Problem List   Diagnosis Code    CVA (cerebral vascular accident) ([address] I63.9    Diabetes mellitus ([address] E11.9    Diabetic foot infection ([address] E11.628, L08.9    Hypertension I10    Hemiparesis affecting left side as late effect of cerebrovascular accident [facility]) I69.354    Peripheral arterial disease ([address] I73.9    Urinary tract infection due to Proteus N39.0, B96.4    Diabetic foot ulcer with osteomyelitis ([address] E11.621, E11.69, L97.509, M86.9    Ulcer of left heel, with necrosis of muscle ([address] L97.423    Atherosclerosis of native artery of left lower extremity with ulceration of heel (HCC) I70.244    Peripheral vascular angioplasty status Z98.62    Ulcer of left heel, with necrosis of bone ([address] L97.424       Patients ID was verified. Hyperbaric oxygen therapy plan of care, patient history, outpatient fall risk assessment, nutritional assessment and daily medications were reviewed, addressed and updated as needed. Pt is tolerating hyperbaric oxygen therapy  well without complications.          Mario Santiago RN  1/25/2022  3:22 PM

## 2022-01-26 ENCOUNTER — HOSPITAL ENCOUNTER (OUTPATIENT)
Dept: HYPERBARIC MEDICINE | Age: 62
Setting detail: THERAPIES SERIES
Discharge: HOME | End: 2022-01-26
Payer: COMMERCIAL

## 2022-01-26 VITALS
RESPIRATION RATE: 16 BRPM | TEMPERATURE: 96.2 F | HEART RATE: 72 BPM | SYSTOLIC BLOOD PRESSURE: 170 MMHG | DIASTOLIC BLOOD PRESSURE: 88 MMHG

## 2022-01-26 DIAGNOSIS — L97.424 DIABETIC ULCER OF LEFT HEEL ASSOCIATED WITH TYPE 2 DIABETES MELLITUS, WITH NECROSIS OF BONE (HCC): Primary | Chronic | ICD-10-CM

## 2022-01-26 DIAGNOSIS — E11.621 DIABETIC ULCER OF LEFT HEEL ASSOCIATED WITH TYPE 2 DIABETES MELLITUS, WITH NECROSIS OF BONE (HCC): Primary | Chronic | ICD-10-CM

## 2022-01-26 LAB
METER GLUCOSE: 120 MG/DL (ref 74–99)
METER GLUCOSE: 134 MG/DL (ref 74–99)

## 2022-01-26 PROCEDURE — G0277 HBOT, FULL BODY CHAMBER, 30M: HCPCS

## 2022-01-26 PROCEDURE — 82962 GLUCOSE BLOOD TEST: CPT

## 2022-01-26 PROCEDURE — 99183 HYPERBARIC OXYGEN THERAPY: CPT | Performed by: SURGERY

## 2022-01-26 NOTE — PROGRESS NOTES
[facility]  Hyperbaric Oxygen Therapy   Progress Note    NAME: Herbert Crespo  MEDICAL RECORD NUMBER:  [id number]  AGE: 57 y.o. GENDER: male  : 1960  EPISODE DATE:  2022   Subjective   HBO Treatment Number: 2 out of Total Treatments: 30  HBO Diagnosis:   Problem List Items Addressed This Visit     Ulcer of left heel, with necrosis of bone ([address] - Primary        Safety checks performed prior to treatment. See doc flowsheets for documentation. Objective         Recent Labs     22  1331 22  1601   GLUMET 157* 130*     Pre treatment Vital Signs       Temp: 97.4 °F (36.3 °C)     Pulse: 90     Resp: 18     BP: 137/75     Post treatment Vital Signs  Temp: 97 °F (36.1 °C)  Pulse: 87  Resp: 16  BP: (!) 168/76 (pt did not take bp med today. advised to do so asap)  Assessment      Physical Exam:  General Appearance:  alert and oriented to person, place and time, well-developed and well-nourished, in no acute distress  ENT:  tympanic membranes intact bilaterally  Pulmonary/Chest:  clear to auscultation bilaterally- no wheezes, rales or rhonchi, normal air movement, no respiratory distress  Cardiovascular:  regular rate and rhythm  Chamber #: [phone]  Treatment Start Time: 1414     Pressure Reached Time: 1419  YOLANDA : 2  Number of Air Breaks:  Treatment Status: No Air break     Decompression Time: 1550   Treatment End Time: 1559  Length of Treatment: 90 Minutes  Symptoms Noted During Treatment: None  Total Treatment Time (min): 105    Adverse Event: no    I was present on these premises and immediately available to furnish assistance & direction throughout the procedure. Plan      Herbert Crespo is a 57 y.o. male  did successfully complete today's hyperbaric oxygen treatment at [address] [place] [address].     In my clinical judgement, ongoing HBO therapy is  necessary at this time, given a threat to patient function, limb or life from the current condition. Supervision and attendance of Hyperbaric Oxygen Therapy provided. Continue HBO treatment as outlined in the treatment plan. Hyperbaric Oxygen: [employer] tolerated Treatment Number: 2 well today without complications.     Discharge Instructions were explained and given to Mr. Bertrand Murdock     Electronically signed by Antonio Payne MD on 1/25/2022 at 9:22 AM

## 2022-01-28 ENCOUNTER — HOSPITAL ENCOUNTER (OUTPATIENT)
Dept: HYPERBARIC MEDICINE | Age: 62
Setting detail: THERAPIES SERIES
Discharge: HOME | End: 2022-01-28
Payer: COMMERCIAL

## 2022-01-28 VITALS
TEMPERATURE: 97.4 F | DIASTOLIC BLOOD PRESSURE: 64 MMHG | RESPIRATION RATE: 16 BRPM | HEART RATE: 88 BPM | SYSTOLIC BLOOD PRESSURE: 118 MMHG

## 2022-01-28 DIAGNOSIS — L97.424 ULCER OF LEFT HEEL, WITH NECROSIS OF BONE (HCC): Primary | ICD-10-CM

## 2022-01-28 LAB
METER GLUCOSE: 114 MG/DL (ref 74–99)
METER GLUCOSE: 138 MG/DL (ref 74–99)

## 2022-01-28 PROCEDURE — 99183 HYPERBARIC OXYGEN THERAPY: CPT | Performed by: SURGERY

## 2022-01-28 PROCEDURE — 82962 GLUCOSE BLOOD TEST: CPT

## 2022-01-28 PROCEDURE — G0277 HBOT, FULL BODY CHAMBER, 30M: HCPCS

## 2022-01-28 NOTE — PROGRESS NOTES
[facility]  Hyperbaric Oxygen Therapy   Progress Note    NAME: Arabella Steinberg  MEDICAL RECORD NUMBER:  [id number]  AGE: 57 y.o. GENDER: male  : 1960  EPISODE DATE:  2022   Subjective   HBO Treatment Number: 4 out of Total Treatments: 30  HBO Diagnosis:   Problem List Items Addressed This Visit     Ulcer of left heel, with necrosis of bone ([address] - Primary        Safety checks performed prior to treatment. See doc flowsheets for documentation. Objective         Recent Labs     22  1337 22  1601   GLUMET 138* 114*     Pre treatment Vital Signs       Temp: 97.2 °F (36.2 °C)     Pulse: 96     Resp: 16     BP: 130/80     Post treatment Vital Signs  Temp: 97.4 °F (36.3 °C)  Pulse: 88  Resp: 16  BP: 118/64  Assessment      Physical Exam:  General Appearance:  alert and oriented to person, place and time, well-developed and well-nourished, in no acute distress  ENT:  tympanic membranes intact bilaterally  Pulmonary/Chest:  clear to auscultation bilaterally- no wheezes, rales or rhonchi, normal air movement, no respiratory distress  Cardiovascular:  regular rate and rhythm  Chamber #: 39  Treatment Start Time: 1405     Pressure Reached Time: 1415  YOLANDA : 2  Number of Air Breaks:  Treatment Status: No Air break     Decompression Time: 1545   Treatment End Time: 1556  Length of Treatment: 90 Minutes  Symptoms Noted During Treatment: None  Total Treatment Time (min): 111    Adverse Event: no    I was present on these premises and immediately available to furnish assistance & direction throughout the procedure. Plan      Arabella Steinberg is a 57 y.o. male  did successfully complete today's hyperbaric oxygen treatment at [address] [place] [address]. In my clinical judgement, ongoing HBO therapy is  necessary at this time, given a threat to patient function, limb or life from the current condition.       Supervision and attendance of Hyperbaric Oxygen Therapy provided. Continue HBO treatment as outlined in the treatment plan. Hyperbaric Oxygen: Blake Steel tolerated Treatment Number: 4 well today without complications.     Discharge Instructions were explained and given to Theresa Wendy Shepherd     Electronically signed by Rosa Isela Junior MD on 1/28/2022 at 4:23 PM

## 2022-01-31 ENCOUNTER — HOSPITAL ENCOUNTER (OUTPATIENT)
Dept: HYPERBARIC MEDICINE | Age: 62
Setting detail: THERAPIES SERIES
Discharge: HOME | End: 2022-01-31
Payer: COMMERCIAL

## 2022-01-31 ENCOUNTER — HOSPITAL ENCOUNTER (OUTPATIENT)
Dept: WOUND CARE | Age: 62
Discharge: HOME | End: 2022-01-31

## 2022-01-31 VITALS
DIASTOLIC BLOOD PRESSURE: 68 MMHG | RESPIRATION RATE: 19 BRPM | TEMPERATURE: 97.4 F | HEART RATE: 89 BPM | SYSTOLIC BLOOD PRESSURE: 126 MMHG

## 2022-01-31 LAB
METER GLUCOSE: 167 MG/DL (ref 74–99)
METER GLUCOSE: 94 MG/DL (ref 74–99)

## 2022-01-31 PROCEDURE — G0277 HBOT, FULL BODY CHAMBER, 30M: HCPCS

## 2022-01-31 PROCEDURE — 99183 HYPERBARIC OXYGEN THERAPY: CPT | Performed by: SURGERY

## 2022-01-31 PROCEDURE — 82962 GLUCOSE BLOOD TEST: CPT

## 2022-02-01 ENCOUNTER — HOSPITAL ENCOUNTER (OUTPATIENT)
Dept: HYPERBARIC MEDICINE | Age: 62
Setting detail: THERAPIES SERIES
Discharge: HOME | End: 2022-02-01
Payer: COMMERCIAL

## 2022-02-01 VITALS
DIASTOLIC BLOOD PRESSURE: 61 MMHG | SYSTOLIC BLOOD PRESSURE: 127 MMHG | TEMPERATURE: 97.1 F | RESPIRATION RATE: 18 BRPM | HEART RATE: 85 BPM

## 2022-02-01 DIAGNOSIS — L97.424 ULCER OF LEFT HEEL, WITH NECROSIS OF BONE (HCC): Primary | ICD-10-CM

## 2022-02-01 LAB
METER GLUCOSE: 190 MG/DL (ref 74–99)
METER GLUCOSE: 274 MG/DL (ref 74–99)

## 2022-02-01 PROCEDURE — 99183 HYPERBARIC OXYGEN THERAPY: CPT | Performed by: SURGERY

## 2022-02-01 PROCEDURE — 82962 GLUCOSE BLOOD TEST: CPT

## 2022-02-01 PROCEDURE — G0277 HBOT, FULL BODY CHAMBER, 30M: HCPCS

## 2022-02-01 NOTE — PROGRESS NOTES
[facility]  Hyperbaric Oxygen Therapy   Progress Note    NAME: Nancy Lira  MEDICAL RECORD NUMBER:  [id number]  AGE: 57 y.o. GENDER: male  : 1960  EPISODE DATE:  2022   Subjective   HBO   out of Total Treatments: 30  HBO Diagnosis:   Problem List Items Addressed This Visit     Ulcer of left heel, with necrosis of bone ([address] - Primary        Safety checks performed prior to treatment. See doc flowsheets for documentation. Objective         Recent Labs     22  1348 22  1616   GLUMET 274* 190*     Pre treatment Vital Signs       Temp: 96.9 °F (36.1 °C)     Pulse: 84     Resp: 16     BP: 128/62     Post treatment Vital Signs  Temp: 97.1 °F (36.2 °C)  Pulse: 85  Resp: 18  BP: 127/61  Assessment      Physical Exam:  General Appearance:  alert and oriented to person, place and time, well-developed and well-nourished, in no acute distress  ENT:  tympanic membranes intact bilaterally  Pulmonary/Chest:  clear to auscultation bilaterally- no wheezes, rales or rhonchi, normal air movement, no respiratory distress  Cardiovascular:  regular rate and rhythm  Chamber #: 38  Treatment Start Time: 1425     Pressure Reached Time: 1434  YOLANDA : 2  Number of Air Breaks:  Treatment Status: No Air break     Decompression Time: 1604   Treatment End Time: 1613  Length of Treatment: 90 Minutes  Symptoms Noted During Treatment: None  Total Treatment Time (min): 108    Adverse Event: no    I was present on these premises and immediately available to furnish assistance & direction throughout the procedure. Plan      Nancy Lira is a 57 y.o. male  did successfully complete today's hyperbaric oxygen treatment at [address] [place] [address]. In my clinical judgement, ongoing HBO therapy is  necessary at this time, given a threat to patient function, limb or life from the current condition. Supervision and attendance of Hyperbaric Oxygen Therapy provided. Continue HBO treatment as outlined in the treatment plan. Hyperbaric Oxygen: Dorivenia Show Damián tolerated   well today without complications.     Discharge Instructions were explained and given to Mr. Jeanne Diego     Electronically signed by Jorge Luis Tillman MD on 2/1/2022 at 4:28 PM

## 2022-02-02 ENCOUNTER — HOSPITAL ENCOUNTER (OUTPATIENT)
Dept: HYPERBARIC MEDICINE | Age: 62
Setting detail: THERAPIES SERIES
Discharge: HOME | End: 2022-02-02
Payer: COMMERCIAL

## 2022-02-02 VITALS
RESPIRATION RATE: 16 BRPM | TEMPERATURE: 97.4 F | HEART RATE: 84 BPM | DIASTOLIC BLOOD PRESSURE: 68 MMHG | SYSTOLIC BLOOD PRESSURE: 138 MMHG

## 2022-02-02 PROBLEM — L97.424 DIABETIC ULCER OF LEFT HEEL ASSOCIATED WITH TYPE 2 DIABETES MELLITUS, WITH NECROSIS OF BONE (HCC): Chronic | Status: ACTIVE | Noted: 2021-12-21

## 2022-02-02 PROBLEM — E11.621 DIABETIC ULCER OF LEFT HEEL ASSOCIATED WITH TYPE 2 DIABETES MELLITUS, WITH NECROSIS OF BONE (HCC): Chronic | Status: ACTIVE | Noted: 2021-12-21

## 2022-02-02 LAB
METER GLUCOSE: 101 MG/DL (ref 74–99)
METER GLUCOSE: 198 MG/DL (ref 74–99)

## 2022-02-02 PROCEDURE — 99183 HYPERBARIC OXYGEN THERAPY: CPT | Performed by: SURGERY

## 2022-02-02 PROCEDURE — 82962 GLUCOSE BLOOD TEST: CPT

## 2022-02-02 PROCEDURE — G0277 HBOT, FULL BODY CHAMBER, 30M: HCPCS

## 2022-02-03 NOTE — PROGRESS NOTES
[facility]  Hyperbaric Oxygen Therapy   Progress Note    NAME: Petrona Cole  MEDICAL RECORD NUMBER:  [id number]  AGE: 57 y.o. GENDER: male  : 1960  EPISODE DATE:  2022   Subjective   HBO Treatment Number: 3 out of Total Treatments: 30  HBO Diagnosis:   Problem List Items Addressed This Visit     * (Principal) 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 2 diabetes mellitus, with necrosis of bone ([address] - Primary (Chronic)        Safety checks performed prior to treatment. See doc flowsheets for documentation. Objective         Recent Labs     22  1336 22  1625   GLUMET 198* 101*     Pre treatment Vital Signs       Temp: 97.3 °F (36.3 °C)     Pulse: 80     Resp: 16     BP: (!) 150/72     Post treatment Vital Signs  Temp: 96.2 °F (35.7 °C)  Pulse: 72  Resp: 16  BP: (!) 170/88  Assessment      Physical Exam:  General Appearance:  alert and oriented to person, place and time, well-developed and well-nourished, in no acute distress  ENT:  tympanic membranes intact bilaterally  Pulmonary/Chest:  clear to auscultation bilaterally- no wheezes, rales or rhonchi, normal air movement, no respiratory distress  Cardiovascular:  regular rate and rhythm  Chamber #: 39  Treatment Start Time: 1442     Pressure Reached Time: 1450  YOLANDA : 2  Number of Air Breaks:  Treatment Status: No Air break     Decompression Time: 1620   Treatment End Time: 7893  Length of Treatment: 90 Minutes  Symptoms Noted During Treatment: None  Total Treatment Time (min): 109    Adverse Event: no    I was present on these premises and immediately available to furnish assistance & direction throughout the procedure. Plan      Petrona Cole is a 57 y.o. male  did successfully complete today's hyperbaric oxygen treatment at [address] [place] [address].     In my clinical judgement, ongoing HBO therapy is  necessary at this time, given a threat to patient function, limb or life from the current condition. Supervision and attendance of Hyperbaric Oxygen Therapy provided. Continue HBO treatment as outlined in the treatment plan. Hyperbaric Oxygen: Rohan Steel tolerated Treatment Number: 3 well today without complications.     Discharge Instructions were explained and given to  Jeannette Toan     Electronically signed by Demetrio Benson MD on 1/26/2022 at 11:20 PM

## 2022-02-04 NOTE — PROGRESS NOTES
[facility]  Hyperbaric Oxygen Therapy   Progress Note    NAME: Chapo De Luna  MEDICAL RECORD NUMBER:  [id number]  AGE: 57 y.o. GENDER: male  : 1960  EPISODE DATE:  2022   Subjective   HBO Treatment Number: 5 out of Total Treatments: 30  HBO Diagnosis: diabetic foot ulcer with necrosis of bone    Safety checks performed prior to treatment by [facility] staff. See doc flowsheets for documentation. Objective         Recent Labs     22  1336 22  1625   GLUMET 198* 101*     Pre treatment Vital Signs       Temp: 97.1 °F (36.2 °C)     Pulse: 96     Resp: 18     BP: 138/60     Post treatment Vital Signs  Temp: 97.4 °F (36.3 °C)  Pulse: 89  Resp: 19  BP: 126/68  Assessment      Physical Exam:  General Appearance:  alert and oriented to person, place and time, well-developed and well-nourished, in no acute distress  ENT:  tympanic membranes intact bilaterally, normal color, normal light reflex bilaterally  Pulmonary/Chest:  clear to auscultation bilaterally- no wheezes, rales or rhonchi, normal air movement, no respiratory distress  Cardiovascular:  regular rate and rhythm  Chamber #: 38  Treatment Start Time: 7278     Pressure Reached Time: 1439  YOLANDA : 2  Number of Air Breaks:  Treatment Status: No Air break     Decompression Time: 1610   Treatment End Time: 1619  Length of Treatment: 90 Minutes  Symptoms Noted During Treatment: None  Total Treatment Time (min): 110    Adverse Event: no    I was present on these premises and immediately available to furnish assistance & direction throughout the procedure. Plan      Chapo De Luna is a 57 y.o. male  did successfully complete today's hyperbaric oxygen treatment at [address] [place] [address]. In my clinical judgement, ongoing HBO therapy is necessary at this time, given a threat to patient function, limb or life from the current condition.       Supervision and attendance of Hyperbaric Oxygen Therapy provided. Continue HBO treatment as outlined in the treatment plan. Hyperbaric Oxygen: Yuliana Steel tolerated Treatment Number: 5 well today without complications.     Discharge Instructions were explained and given to Mr. Sidney Levi by [facility] staff    Electronically signed by Noe Lesch, MD

## 2022-02-04 NOTE — PROGRESS NOTES
[facility]  Hyperbaric Oxygen Therapy   Progress Note    NAME: Misti Valdes  MEDICAL RECORD NUMBER:  [id number]  AGE: 57 y.o. GENDER: male  : 1960  EPISODE DATE:  2022   Subjective   HBO Treatment Number: 1 out of Total Treatments: 30  HBO Diagnosis: diabetic foot ulcer with necrosis of bone    Safety checks performed prior to treatment by [facility] staff. See doc flowsheets for documentation. Objective         Recent Labs     22  1336 22  1625   GLUMET 198* 101*     Pre treatment Vital Signs       Temp: 96.7 °F (35.9 °C)     Pulse: 76     Resp: 18     BP: 110/62     Post treatment Vital Signs  Temp: 97.1 °F (36.2 °C)  Pulse: 88  Resp: 18  BP: 132/68  Assessment      Physical Exam:  General Appearance:  alert and oriented to person, place and time, well-developed and well-nourished, in no acute distress  ENT:  tympanic membranes intact bilaterally, normal color, normal light reflex bilaterally  Pulmonary/Chest:  clear to auscultation bilaterally- no wheezes, rales or rhonchi, normal air movement, no respiratory distress  Cardiovascular:  regular rate and rhythm  Chamber #: 36  Treatment Start Time: 1439     Pressure Reached Time: 1456  YOLANDA : 2  Number of Air Breaks:  Treatment Status: No Air break     Decompression Time: 1626   Treatment End Time: 1636  Length of Treatment: 90 Minutes  Symptoms Noted During Treatment: None  Total Treatment Time (min): 117    Adverse Event: no    I was present on these premises and immediately available to furnish assistance & direction throughout the procedure. Plan      Misti Valdes is a 57 y.o. male  did successfully complete today's hyperbaric oxygen treatment at [address] [place] [address]. In my clinical judgement, ongoing HBO therapy is necessary at this time, given a threat to patient function, limb or life from the current condition.       Supervision and attendance of Hyperbaric Oxygen Therapy provided. Continue HBO treatment as outlined in the treatment plan. Hyperbaric Oxygen: Tray Steel tolerated Treatment Number: 1 well today without complications.     Discharge Instructions were explained and given to Mr. Carla David by [facility] staff    Electronically signed by Lupillo Magallon MD

## 2022-02-07 ENCOUNTER — HOSPITAL ENCOUNTER (OUTPATIENT)
Dept: HYPERBARIC MEDICINE | Age: 62
Discharge: HOME | End: 2022-02-07

## 2022-02-09 ENCOUNTER — HOSPITAL ENCOUNTER (OUTPATIENT)
Dept: HYPERBARIC MEDICINE | Age: 62
Setting detail: THERAPIES SERIES
Discharge: HOME | End: 2022-02-09
Payer: COMMERCIAL

## 2022-02-09 VITALS
DIASTOLIC BLOOD PRESSURE: 80 MMHG | HEART RATE: 88 BPM | TEMPERATURE: 97.9 F | RESPIRATION RATE: 16 BRPM | SYSTOLIC BLOOD PRESSURE: 138 MMHG

## 2022-02-09 DIAGNOSIS — E11.621 DIABETIC ULCER OF LEFT HEEL ASSOCIATED WITH TYPE 2 DIABETES MELLITUS, WITH NECROSIS OF BONE (HCC): Primary | Chronic | ICD-10-CM

## 2022-02-09 DIAGNOSIS — L97.424 DIABETIC ULCER OF LEFT HEEL ASSOCIATED WITH TYPE 2 DIABETES MELLITUS, WITH NECROSIS OF BONE (HCC): Primary | Chronic | ICD-10-CM

## 2022-02-09 LAB
METER GLUCOSE: 111 MG/DL (ref 74–99)
METER GLUCOSE: 177 MG/DL (ref 74–99)

## 2022-02-09 PROCEDURE — 82962 GLUCOSE BLOOD TEST: CPT

## 2022-02-09 PROCEDURE — G0277 HBOT, FULL BODY CHAMBER, 30M: HCPCS

## 2022-02-09 PROCEDURE — 99183 HYPERBARIC OXYGEN THERAPY: CPT | Performed by: SURGERY

## 2022-02-10 ENCOUNTER — HOSPITAL ENCOUNTER (OUTPATIENT)
Dept: WOUND CARE | Age: 62
Discharge: HOME | End: 2022-02-10

## 2022-02-11 ENCOUNTER — HOSPITAL ENCOUNTER (OUTPATIENT)
Dept: HYPERBARIC MEDICINE | Age: 62
Setting detail: THERAPIES SERIES
Discharge: HOME | End: 2022-02-11
Payer: COMMERCIAL

## 2022-02-11 VITALS
HEART RATE: 86 BPM | RESPIRATION RATE: 18 BRPM | TEMPERATURE: 97 F | SYSTOLIC BLOOD PRESSURE: 144 MMHG | DIASTOLIC BLOOD PRESSURE: 76 MMHG

## 2022-02-11 DIAGNOSIS — L97.424 DIABETIC ULCER OF LEFT HEEL ASSOCIATED WITH TYPE 2 DIABETES MELLITUS, WITH NECROSIS OF BONE (HCC): ICD-10-CM

## 2022-02-11 DIAGNOSIS — L97.424 ULCER OF LEFT HEEL, WITH NECROSIS OF BONE (HCC): Primary | ICD-10-CM

## 2022-02-11 DIAGNOSIS — E11.621 DIABETIC ULCER OF LEFT HEEL ASSOCIATED WITH TYPE 2 DIABETES MELLITUS, WITH NECROSIS OF BONE (HCC): ICD-10-CM

## 2022-02-11 LAB
METER GLUCOSE: 143 MG/DL (ref 74–99)
METER GLUCOSE: 95 MG/DL (ref 74–99)

## 2022-02-11 PROCEDURE — 99183 HYPERBARIC OXYGEN THERAPY: CPT | Performed by: SURGERY

## 2022-02-11 PROCEDURE — 82962 GLUCOSE BLOOD TEST: CPT

## 2022-02-11 PROCEDURE — G0277 HBOT, FULL BODY CHAMBER, 30M: HCPCS

## 2022-02-11 NOTE — PROGRESS NOTES
[facility]  Hyperbaric Oxygen Therapy   Progress Note    NAME: Radha Rizzo  MEDICAL RECORD NUMBER:  [id number]  AGE: 57 y.o. GENDER: male  : 1960  EPISODE DATE:  2022   Subjective   HBO Treatment Number: 9 out of Total Treatments: 30  HBO Diagnosis:   Problem List Items Addressed This Visit     Ulcer of left heel, with necrosis of bone ([address] - Primary    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 2 diabetes mellitus, with necrosis of bone ([address] (Chronic)        Safety checks performed prior to treatment. See doc flowsheets for documentation. Objective         Recent Labs     22  1331 22  1628   GLUMET 143* 95     Pre treatment Vital Signs       Temp: 97.1 °F (36.2 °C)     Pulse: 96     Resp: 18     BP: (!) 140/70     Post treatment Vital Signs  Temp: 97 °F (36.1 °C)  Pulse: 86  Resp: 18  BP: (!) 144/76 (pt to recheck at home)  Assessment      Physical Exam:  General Appearance:  alert and oriented to person, place and time, well-developed and well-nourished, in no acute distress  ENT:  tympanic membranes intact bilaterally  Pulmonary/Chest:  clear to auscultation bilaterally- no wheezes, rales or rhonchi, normal air movement, no respiratory distress  Cardiovascular:  regular rate and rhythm  Chamber #: 38  Treatment Start Time: 1435     Pressure Reached Time: 1444  YOLANDA : 2  Number of Air Breaks:  Treatment Status: No Air break     Decompression Time: 1614   Treatment End Time: 1625  Length of Treatment: 90 Minutes  Symptoms Noted During Treatment: None  Total Treatment Time (min): 110    Adverse Event: no    I was present on these premises and immediately available to furnish assistance & direction throughout the procedure. Plan      Radha Rizzo is a 57 y.o. male  did successfully complete today's hyperbaric oxygen treatment at [address] [place] [address].     In my clinical judgement, ongoing HBO therapy is  necessary at this time, given a threat to patient function, limb or life from the current condition. Supervision and attendance of Hyperbaric Oxygen Therapy provided. Continue HBO treatment as outlined in the treatment plan. Hyperbaric Oxygen: Lewis Steel tolerated Treatment Number: 9 well today without complications.     Discharge Instructions were explained and given to Mr. Izzy Alejandre     Electronically signed by Vonnie Daley MD on 2/11/2022 at 4:57 PM

## 2022-02-14 ENCOUNTER — HOSPITAL ENCOUNTER (OUTPATIENT)
Dept: WOUND CARE | Age: 62
Discharge: HOME | End: 2022-02-14
Payer: COMMERCIAL

## 2022-02-14 VITALS
HEIGHT: 70 IN | SYSTOLIC BLOOD PRESSURE: 128 MMHG | DIASTOLIC BLOOD PRESSURE: 70 MMHG | BODY MASS INDEX: 29.63 KG/M2 | WEIGHT: 207 LBS | TEMPERATURE: 99.2 F | RESPIRATION RATE: 18 BRPM | HEART RATE: 62 BPM

## 2022-02-14 DIAGNOSIS — E11.621 DIABETIC ULCER OF LEFT HEEL ASSOCIATED WITH TYPE 2 DIABETES MELLITUS, WITH NECROSIS OF BONE (HCC): Primary | ICD-10-CM

## 2022-02-14 DIAGNOSIS — E11.628 DIABETIC FOOT INFECTION (HCC): ICD-10-CM

## 2022-02-14 DIAGNOSIS — L97.424 DIABETIC ULCER OF LEFT HEEL ASSOCIATED WITH TYPE 2 DIABETES MELLITUS, WITH NECROSIS OF BONE (HCC): Primary | ICD-10-CM

## 2022-02-14 DIAGNOSIS — L08.9 DIABETIC FOOT INFECTION (HCC): ICD-10-CM

## 2022-02-14 PROCEDURE — 11042 DBRDMT SUBQ TIS 1ST 20SQCM/<: CPT

## 2022-02-14 RX ORDER — BACITRACIN ZINC AND POLYMYXIN B SULFATE 500; 1000 [USP'U]/G; [USP'U]/G
OINTMENT TOPICAL ONCE
Status: CANCELLED | OUTPATIENT
Start: 2022-02-14 | End: 2022-02-14

## 2022-02-14 RX ORDER — LIDOCAINE 50 MG/G
OINTMENT TOPICAL ONCE
Status: CANCELLED | OUTPATIENT
Start: 2022-02-14 | End: 2022-02-14

## 2022-02-14 RX ORDER — LIDOCAINE HYDROCHLORIDE 40 MG/ML
SOLUTION TOPICAL ONCE
Status: DISCONTINUED | OUTPATIENT
Start: 2022-02-14 | End: 2022-02-15 | Stop reason: HOSPADM

## 2022-02-14 RX ORDER — LIDOCAINE HYDROCHLORIDE 20 MG/ML
JELLY TOPICAL ONCE
Status: CANCELLED | OUTPATIENT
Start: 2022-02-14 | End: 2022-02-14

## 2022-02-14 RX ORDER — LIDOCAINE 40 MG/G
CREAM TOPICAL ONCE
Status: CANCELLED | OUTPATIENT
Start: 2022-02-14 | End: 2022-02-14

## 2022-02-14 RX ORDER — BETAMETHASONE DIPROPIONATE 0.05 %
OINTMENT (GRAM) TOPICAL ONCE
Status: CANCELLED | OUTPATIENT
Start: 2022-02-14 | End: 2022-02-14

## 2022-02-14 RX ORDER — GINSENG 100 MG
CAPSULE ORAL ONCE
Status: CANCELLED | OUTPATIENT
Start: 2022-02-14 | End: 2022-02-14

## 2022-02-14 RX ORDER — LIDOCAINE HYDROCHLORIDE 40 MG/ML
SOLUTION TOPICAL ONCE
Status: CANCELLED | OUTPATIENT
Start: 2022-02-14 | End: 2022-02-14

## 2022-02-14 RX ORDER — CLOBETASOL PROPIONATE 0.5 MG/G
OINTMENT TOPICAL ONCE
Status: CANCELLED | OUTPATIENT
Start: 2022-02-14 | End: 2022-02-14

## 2022-02-14 RX ORDER — BACITRACIN, NEOMYCIN, POLYMYXIN B 400; 3.5; 5 [USP'U]/G; MG/G; [USP'U]/G
OINTMENT TOPICAL ONCE
Status: CANCELLED | OUTPATIENT
Start: 2022-02-14 | End: 2022-02-14

## 2022-02-14 RX ORDER — GENTAMICIN SULFATE 1 MG/G
OINTMENT TOPICAL ONCE
Status: CANCELLED | OUTPATIENT
Start: 2022-02-14 | End: 2022-02-14

## 2022-02-14 ASSESSMENT — PAIN DESCRIPTION - LOCATION: LOCATION: FOOT

## 2022-02-14 ASSESSMENT — PAIN DESCRIPTION - PROGRESSION: CLINICAL_PROGRESSION: NOT CHANGED

## 2022-02-14 ASSESSMENT — PAIN DESCRIPTION - ORIENTATION: ORIENTATION: LEFT

## 2022-02-14 ASSESSMENT — PAIN DESCRIPTION - DESCRIPTORS: DESCRIPTORS: THROBBING

## 2022-02-14 ASSESSMENT — PAIN SCALES - GENERAL: PAINLEVEL_OUTOF10: 4

## 2022-02-14 ASSESSMENT — PAIN DESCRIPTION - FREQUENCY: FREQUENCY: INTERMITTENT

## 2022-02-14 ASSESSMENT — PAIN DESCRIPTION - PAIN TYPE: TYPE: CHRONIC PAIN

## 2022-02-14 ASSESSMENT — PAIN DESCRIPTION - ONSET: ONSET: ON-GOING

## 2022-02-14 ASSESSMENT — PAIN - FUNCTIONAL ASSESSMENT: PAIN_FUNCTIONAL_ASSESSMENT: ACTIVITIES ARE NOT PREVENTED

## 2022-02-14 NOTE — PROGRESS NOTES
Wound Healing Center Followup Visit Note    Referring Physician : Bismark Alexis MD  [id number] [facility] RECORD NUMBER:  [id number]  AGE: 57 y.o. GENDER: male  : 1960  EPISODE DATE:  2022    Subjective:     Chief Complaint   Patient presents with    Wound Check     left heel      HISTORY of PRESENT ILLNESS CHIQUITA Camargo is a 57 y.o. male who presents today in regards to follow up evaluation and treatment of wound/ulcer. That patient's past medical, family and social hx were reviewed and changes were made if present. History of Wound Context:  The patient has had a wound of bilateral heels which was first noted approximately over a month ago. This has been treated with surgical debridement. On their initial visit to the wound healing center, 21 ,  the patient has noted that the wound has not been improving. The patient has had similar previous wounds in the past.       Pt is not on abx at time of initial visit.     3/22/21 - Wound VAC MWF continue to left heel. Aquacel Ag to right heel. Debrided toenails 1-5 in thickness and length. FU 1 week. IV Abx per Dr. Isabelle Walters.     21 - Wound VAC MWF to left heel. Cultures obtained. FU 1 week after visit with Dr. Isabelle Walters. PO Abx.  21 - DC wound VAC to left heel. Alginate to wound. FU 1 week  21 - Alginate and gentamicin ointment QOD. Partial WB to heel at 50% to foot with surgical shoe and walker.     5/3/21 - Alginate and gentamicin ointment QOD. MRI left heel     5/10/21 - Alginate and gentamicin ointment QOD. MRI left heel pending.     21 - Alginate and gentamicin ointment QOD. MRI reviewed confirming OM. Discussed HBOT referral and partial calcanectomy. He opts for HBOT consultation.     21 - Alginate and add gentamicin ointment QD. HBOT referral.  Patient hold off on partial calcanectomy. FU 1 week     21 - Alginate and add gentamicin ointment QD.   HBOT referral.  Patient hold off on partial calcanectomy. FU 1 week     6/28/21 - Alginate and add gentamicin ointment QD. HBOT referral.  Patient hold off on partial calcanectomy. FU 1 week. Patient gave me consent (verbal) to speak with his brother Diana Yanez regarding his at home arrangement. HBOT is on hold until patient is at home.     7/12/21 -  Alginate and add gentamicin ointment QD. HBOT referral.  Patient considering partial calcanectomy with flap. FU 1 week.      7/19/21 - Alginate and add gentamicin ointment QD. HBOT referral.  Patient considering partial calcanectomy with flap. FU 1 week.      8/2/21 - Alginate and add gentamicin ointment QD. HBOT referral.  Patient considering partial calcanectomy with flap. FU 1 week.      8/16/21 - Alginate and add gentamicin ointment QD. HBOT referral.  Patient considering partial calcanectomy with flap. FU 1 week.      8/23/21 - Alginate and add gentamicin ointment QD. HBOT referral.  Patient considering partial calcanectomy with flap. FU 1 week.      8/30/21 - Alginate and add gentamicin ointment QD. HBOT referral.  Patient considering partial calcanectomy with flap. FU 1 week.      9/20/21 - Alginate and add gentamicin ointment QD. HBOT referral.  Patient considering partial calcanectomy with flap. FU 1 week.      10/25/21 Alginate and add gentamicin ointment QD. HBOT referral.  Patient considering partial calcanectomy with flap. FU 1 week.      11/1/21 Alginate and add gentamicin ointment QD. HBOT referral.  Patient considering partial calcanectomy with flap. FU 1 week.      11/8/21 - Alginate and add gentamicin ointment QD. HBOT referral.  Patient considering partial calcanectomy with flap. FU 1 week.      11/15/21 - Alginate and add gentamicin ointment QD. HBOT referral.  Patient considering partial calcanectomy with flap. FU 1 week.      11/22/21 -  Alginate and add gentamicin ointment QD. HBOT referral.  Patient considering partial calcanectomy with flap. FU 1 week.    11/29/21 0 Alginate and gentamicin ointment. Possible DC home. HBOT referral.  Patient considering partial calcanectomy with flap. FU 1 week.      12/6/21 - Alginate and gentamicin ointment. Possible DC home. HBOT referral.  Patient considering partial calcanectomy with flap. FU 1 week.      12/13/21 - Alginate and gentamicin ointment. DC home. HBOT referral.  [facility]  referral for wound care and PT for GAIT training and safe DME use. Patient considering partial calcanectomy with flap. FU 1 week.      12/20/21 -  Alginate and gentamicin ointment. DC home. HBOT referral.     12/27/21 - HBOT consultation complete - to start HBOT after new year. 2-14-22 patient presents today for evaluation of a left heel wound. Patient transferred here to [facility] care [facility] due to the fact that he is doing HBO, for convenience due to transportation. He was prior following with Dr. Michelle Silverman. Patient overall doing well, no complaints. Tolerating dressings as well as hyperbarics. Physical exam demonstrates vascular intact. Wound appreciated posterior aspect with areas of devitalized nonviable tissue with beefy tissue noted as well. There is no purulence, odor, erythema or increase in temperature. Currently no exposed bone.      Wound/Ulcer Pain Timing/Severity: none  Quality of pain: N/A  Severity:  0 / 10   Modifying Factors: None  Associated Signs/Symptoms: none     Ulcer Identification:  Ulcer Type: arterial and diabetic  Contributing Factors: diabetes     Diabetic/Pressure/Non Pressure Ulcers onl y:        PAST MEDICAL HISTORY      Diagnosis Date    Cerebral artery occlusion with cerebral infarction ([address]     Diabetes mellitus ([address]     Type 2    Diabetic foot ulcer with osteomyelitis ([address] 12/21/2021    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 2 diabetes mellitus, with necrosis of bone ([address] 12/21/2021    Hemiparesis affecting left side as late effect of cerebrovascular accident ([address] LEFT SIDE NON DOMINANT FOLLOWING STROKE    Hypertension     Peripheral vascular angioplasty status 12/21/2021    Ulcer of left heel, with necrosis of bone ([facility] 12/27/2021    Ulcer of left heel, with necrosis of muscle ([facility] 12/21/2021     Past Surgical History:   Procedure Laterality Date    FINGER AMPUTATION      FOOT DEBRIDEMENT Left 2/16/2021    LEFT FOOT DEBRIDEMENT WITH BONE BIOPSY, WOUND VAC APPLICATION performed by Renee Rangel DPM at [place] [address] ARTHROSCOPY      TONSILLECTOMY      TRANSESOPHAGEAL ECHOCARDIOGRAM N/A 2/22/2021    TRANSESOPHAGEAL ECHOCARDIOGRAM WITH BUBBLE STUDY performed by Coral Moore MD at [address] N/A 1/4/2021    EGD BIOPSY performed by Jermaine Swift DO at [place] [address]     No family history on file. Social History     Tobacco Use    Smoking status: Former Smoker    Smokeless tobacco: Never Used   Vaping Use    Vaping Use: Never used   Substance Use Topics    Alcohol use: Not Currently     Comment: Former every day drinker for most of adult life    Drug use: No     Allergies   Allergen Reactions    Pcn [Penicillins]      Current Outpatient Medications on File Prior to Encounter   Medication Sig Dispense Refill    diphenhydrAMINE (BENADRYL ALLERGY) 25 MG capsule Take 25 mg by mouth every 6 hours as needed for Itching      gabapentin (NEURONTIN) 100 MG capsule Take 100 mg by mouth 2 times daily.        B Complex-C-E-Zn (STRESS B/ZINC) TABS Take 1 tablet by mouth daily      ferrous sulfate (IRON 325) 325 (65 Fe) MG tablet Take 325 mg by mouth daily (with breakfast)      vitamin D (ERGOCALCIFEROL) 1.25 MG (70513 UT) CAPS capsule Take 1 capsule by mouth once a week 5 capsule 0    melatonin 3 MG TABS tablet Take 9 mg by mouth nightly as needed      aspirin 81 MG EC tablet Take 81 mg by mouth daily      hydroCHLOROthiazide (HYDRODIURIL) 25 MG tablet Take 25 mg by mouth daily      metFORMIN (GLUCOPHAGE) 500 MG tablet Take 1 tablet by mouth 2 times daily (with meals) 60 tablet 3    atorvastatin (LIPITOR) 40 MG tablet Take 1 tablet by mouth nightly 30 tablet 3    clopidogrel (PLAVIX) 75 MG tablet Take 1 tablet by mouth daily 30 tablet 3    acetaminophen (TYLENOL) 325 MG tablet Take 650 mg by mouth every 6 hours as needed for Pain       No current facility-administered medications on file prior to encounter. REVIEW OF SYSTEMS See HPI    Objective:    /70   Pulse 62   Temp 99.2 °F (37.3 °C) (Temporal)   Resp 18   Ht 5' 10\" (1.778 m)   Wt 207 lb (93.9 kg)   BMI 29.70 kg/m²   Wt Readings from Last 3 Encounters:   02/14/22 207 lb (93.9 kg)   12/27/21 207 lb (93.9 kg)   12/20/21 207 lb (93.9 kg)     PHYSICAL EXAM  CONSTITUTIONAL:   Awake, alert, cooperative   EYES:  lids and lashes normal   ENT: external ears and nose without lesions   NECK:  supple, symmetrical, trachea midline   SKIN:  Open wound     Assessment:     DIABETIC ULCER.     Pre Debridement Measurements:  Are located in the [place]  Documentation Flow Sheet  Post Debridement Measurements:  Wound/Ulcer Descriptions are Pre Debridement except measurements:     Wound 12/26/20 Arm Left (Active)   Number of days: 414       Wound 02/15/21 Heel Left (Active)   Number of days: 364       Wound 02/15/21 Heel Right (Active)   Number of days: 364       Wound 03/08/21 Heel Left #1 left heel aquired 12/1/20 (Active)   Wound Image   02/14/22 1344   Wound Etiology Diabetic 02/14/22 1344   Dressing Status New dressing applied 01/06/22 1404   Wound Cleansed Cleansed with saline 01/06/22 1404   Dressing/Treatment Alginate;ABD;Dry dressing;Roll gauze 01/06/22 1404   Offloading for Diabetic Foot Ulcers Post op shoe 01/06/22 1404   Wound Length (cm) 3.5 cm 02/14/22 1344   Wound Width (cm) 2.8 cm 02/14/22 1344   Wound Depth (cm) 0.8 cm 02/14/22 1344   Wound Surface Area (cm^2) 9.8 cm^2 02/14/22 1344   Change in Wound Size % (l*w) 18.94 02/14/22 1344   Wound Volume (cm^3) 7.84 cm^3 02/14/22 1344   Wound Healing % 7 02/14/22 1344   Post-Procedure Length (cm) 3.5 cm 02/14/22 1346   Post-Procedure Width (cm) 2.8 cm 02/14/22 1346   Post-Procedure Depth (cm) 1 cm 02/14/22 1346   Post-Procedure Surface Area (cm^2) 9.8 cm^2 02/14/22 1346   Post-Procedure Volume (cm^3) 9.8 cm^3 02/14/22 1346   Undermining Starts ___ O'Clock 6 02/14/22 1344   Undermining Ends___ O'Clock 10 02/14/22 1344   Undermining Maxium Distance (cm) 0.4 02/14/22 1344   Wound Assessment Fibrin;Pale granulation tissue 02/14/22 1344   Drainage Amount Moderate 02/14/22 1344   Drainage Description Yellow 02/14/22 1344   Odor None 02/14/22 1344   Adrianne-wound Assessment Maceration; Non-blanchable erythema 02/14/22 1344   Number of days: 343       Wound 03/08/21 Heel Right #2 rt heel aquired 12/1/20 (Active)   Wound Image   04/05/21 0929   Dressing Status New dressing applied 03/18/21 1352   Wound Cleansed Cleansed with saline 04/01/21 1536   Dressing/Treatment Alginate;Collagen;Silicone border 40/64/79 1536   Offloading for Diabetic Foot Ulcers Post op shoe 04/01/21 1536   Wound Length (cm) 0 cm 04/05/21 0929   Wound Width (cm) 0 cm 04/05/21 0929   Wound Depth (cm) 0 cm 04/05/21 0929   Wound Surface Area (cm^2) 0 cm^2 04/05/21 0929   Change in Wound Size % (l*w) 100 04/05/21 0929   Wound Volume (cm^3) 0 cm^3 04/05/21 0929   Wound Healing % 100 04/05/21 0929   Post-Procedure Length (cm) 0 cm 04/05/21 1011   Post-Procedure Width (cm) 0 cm 04/05/21 1011   Post-Procedure Depth (cm) 0 cm 04/05/21 1011   Post-Procedure Surface Area (cm^2) 0 cm^2 04/05/21 1011   Post-Procedure Volume (cm^3) 0 cm^3 04/05/21 1011   Wound Assessment Fibrin 04/01/21 1357   Drainage Amount None 04/01/21 1357   Drainage Description Yellow 03/22/21 0911   Odor None 04/01/21 1357   Adrianne-wound Assessment Maceration 04/01/21 1357   Number of days: 343     Incision 02/19/21 Groin Right (Active)   Number of days: 360       Procedure Note  Indications:  Based on my examination of this patient's wound(s)/ulcer(s) today, debridement is required to promote healing and evaluate the wound base. Performed by: Aubrey Kawasaki, DPM    Consent obtained:  Yes    Time out taken:  Yes    Pain Control: Anesthetic  Anesthetic: 4% Lidocaine Liquid Topical     Debridement:Excisional Debridement    Using curette the wound(s)/ulcer(s) was/were sharply debrided down through and including the removal of subcutaneous tissue. Devitalized Tissue Debrided:  fibrin, biofilm, slough and necrotic/eschar to stimulate bleeding to promote healing, post debridement good bleeding base and wound edges noted    Wound/Ulcer #: 1    Percent of Wound/Ulcer Debrided: 100%    Total Surface Area Debrided:  9.8 sq cm     Estimated Blood Loss:  Minimal  Hemostasis Achieved:  by pressure    Procedural Pain:  0  / 10   Post Procedural Pain:  0 / 10     Response to treatment:  Well tolerated by patient. Plan:   Treatment Note please see attached Discharge Instructions    Written patient dismissal instructions given to patient and signed by patient or POA.          Discharge Instructions         Discharge condition: Stable     Assessment of pain at discharge: moderate     Anesthetic used: 4% liquid lidocaine solution      Discharge to: ECF     Left via:ambulance     Accompanied by: self     [facility]      Dressing Orders:  Cleanse left heel ulcer with normal saline solution apply first gentamicin 0.1% ointment and cover with plain alginate and dry dressing and change every other day or as needed for drainage.  .      Treatment Orders:    Continue antibiotics per Dr Chandu Juarez and Cipro for suppression.  Wear prevalon boots or heel ayo while in bed.          May be partial weight bearing up to 50% on left with surgical shoe.     HBO consult ordered     Consider surgical debridement of infected bone in heel with skin graft.     Start PT evaluate and treat as indicated three times a week.     Dr Isabelle Walters suggesting wound vac while in HBO?     [facility] followup visit: _____ dr Levi Berrios next avaliable _______________ 1 weeks Dr Harrell/Marleen____________________________  (Please note your next appointment above and if you are unable to keep, kindly give a 24 hour notice.  Thank you.)     Physician signature:__________________________        If you experience any of the following, please call the [facility] during business hours:     * Increase in Pain  * Temperature over 101  * Increase in drainage from your wound  * Drainage with a foul odor  * Bleeding  * Increase in swelling  * Need for compression bandage changes due to slippage, breakthrough drainage.     If you need medical attention outside of the business hours of the [address] please contact your PCP or go to the nearest emergency room.                                                                                                                   Electronically signed by Florin Butler DPM on 2/14/2022 at 2:04 PM

## 2022-02-15 ENCOUNTER — HOSPITAL ENCOUNTER (OUTPATIENT)
Dept: HYPERBARIC MEDICINE | Age: 62
Setting detail: THERAPIES SERIES
Discharge: HOME | End: 2022-02-15
Payer: COMMERCIAL

## 2022-02-15 VITALS
SYSTOLIC BLOOD PRESSURE: 150 MMHG | RESPIRATION RATE: 18 BRPM | DIASTOLIC BLOOD PRESSURE: 60 MMHG | HEART RATE: 84 BPM | TEMPERATURE: 96.7 F

## 2022-02-15 DIAGNOSIS — E11.621 DIABETIC ULCER OF LEFT HEEL ASSOCIATED WITH TYPE 2 DIABETES MELLITUS, WITH NECROSIS OF BONE (HCC): Primary | ICD-10-CM

## 2022-02-15 DIAGNOSIS — L97.424 DIABETIC ULCER OF LEFT HEEL ASSOCIATED WITH TYPE 2 DIABETES MELLITUS, WITH NECROSIS OF BONE (HCC): Primary | ICD-10-CM

## 2022-02-15 LAB
METER GLUCOSE: 125 MG/DL (ref 74–99)
METER GLUCOSE: 195 MG/DL (ref 74–99)

## 2022-02-15 PROCEDURE — 99183 HYPERBARIC OXYGEN THERAPY: CPT | Performed by: SURGERY

## 2022-02-15 PROCEDURE — G0277 HBOT, FULL BODY CHAMBER, 30M: HCPCS

## 2022-02-15 PROCEDURE — 82962 GLUCOSE BLOOD TEST: CPT

## 2022-02-15 NOTE — PROGRESS NOTES
Bonny Huertas is a 57 y.o. male has been receiving hyperbaric oxygen treatment for management of: Indication  Indications:  (LLE)  K[facility] 3. Mr. Sharon Porter has completed Treatment Number: 10 out of a treatment protocol of Total Treatments: 30.    Problem List:  Patient Active Problem List   Diagnosis Code    CVA (cerebral vascular accident) ([address] I63.9    Diabetes mellitus ([address] E11.9    Diabetic foot infection ([address] E11.628, L08.9    Hypertension I10    Hemiparesis affecting left side as late effect of cerebrovascular accident [facility]) D98.697    Peripheral arterial disease ([address] I73.9    Urinary tract infection due to Proteus N39.0, B96.4    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 2 diabetes mellitus, with necrosis of bone ([address] E11.621, L97.424    Ulcer of left heel, with necrosis of muscle (HCC) L97.423    Atherosclerosis of native artery of left lower extremity with ulceration of heel (HCC) I70.244    Peripheral vascular angioplasty status Z98.62    Ulcer of left heel, with necrosis of bone ([address] L97.424       Patients ID was verified. Hyperbaric oxygen therapy plan of care, patient history, outpatient fall risk assessment, nutritional assessment and daily medications were reviewed, addressed and updated as needed. Pt is tolerating hyperbaric oxygen therapy  well without complications.          Mendy Dyer RN  2/15/2022  3:48 PM

## 2022-02-16 ENCOUNTER — HOSPITAL ENCOUNTER (OUTPATIENT)
Dept: HYPERBARIC MEDICINE | Age: 62
Setting detail: THERAPIES SERIES
Discharge: HOME | End: 2022-02-16
Payer: COMMERCIAL

## 2022-02-16 VITALS
SYSTOLIC BLOOD PRESSURE: 118 MMHG | HEART RATE: 88 BPM | RESPIRATION RATE: 18 BRPM | DIASTOLIC BLOOD PRESSURE: 70 MMHG | TEMPERATURE: 98.1 F

## 2022-02-16 DIAGNOSIS — L97.424 DIABETIC ULCER OF LEFT HEEL ASSOCIATED WITH TYPE 2 DIABETES MELLITUS, WITH NECROSIS OF BONE (HCC): Primary | ICD-10-CM

## 2022-02-16 DIAGNOSIS — E11.621 DIABETIC ULCER OF LEFT HEEL ASSOCIATED WITH TYPE 2 DIABETES MELLITUS, WITH NECROSIS OF BONE (HCC): Primary | ICD-10-CM

## 2022-02-16 LAB
METER GLUCOSE: 139 MG/DL (ref 74–99)
METER GLUCOSE: 158 MG/DL (ref 74–99)

## 2022-02-16 PROCEDURE — 99183 HYPERBARIC OXYGEN THERAPY: CPT | Performed by: SURGERY

## 2022-02-16 PROCEDURE — 82962 GLUCOSE BLOOD TEST: CPT

## 2022-02-16 PROCEDURE — G0277 HBOT, FULL BODY CHAMBER, 30M: HCPCS

## 2022-02-16 NOTE — PROGRESS NOTES
[facility]  Hyperbaric Oxygen Therapy   Progress Note    NAME: Stacy Silva  MEDICAL RECORD NUMBER:  [id number]  AGE: 57 y.o. GENDER: male  : 1960  EPISODE DATE:  2/15/2022   Subjective   HBO Treatment Number: 10 out of Total Treatments: 30  HBO Diagnosis:   Problem List Items Addressed This Visit     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 2 diabetes mellitus, with necrosis of bone ([address] - Primary (Chronic)    Relevant Orders    Hypoglycemial protocol    POCT glucose    Care order/instruction    Care order/instruction        Safety checks performed prior to treatment. See doc flowsheets for documentation. Objective         Recent Labs     02/15/22  1330 02/15/22  1600   GLUMET 195* 125*     Pre treatment Vital Signs       Temp: 97.6 °F (36.4 °C)     Pulse: 76     Resp: 18     BP: 126/70     Post treatment Vital Signs  Temp: 96.7 °F (35.9 °C)  Pulse: 84  Resp: 18  BP: (!) 150/60  Assessment      Physical Exam:  General Appearance:  alert and oriented to person, place and time, well-developed and well-nourished, in no acute distress  ENT:  tympanic membranes intact bilaterally  Pulmonary/Chest:  clear to auscultation bilaterally- no wheezes, rales or rhonchi, normal air movement, no respiratory distress  Cardiovascular:  regular rate and rhythm  Chamber #: [id number]  Treatment Start Time: 1406     Pressure Reached Time: 1416  YOLANDA : 2  Number of Air Breaks:  Treatment Status: No Air break     Decompression Time: 1548   Treatment End Time: 1559  Length of Treatment: 90 Minutes  Symptoms Noted During Treatment: None  Total Treatment Time (min): 113    Adverse Event: no    I was present on these premises and immediately available to furnish assistance & direction throughout the procedure. Plan      Stacy Silva is a 57 y.o. male  did successfully complete today's hyperbaric oxygen treatment at [address] [place] [address].     In my clinical judgement, ongoing HBO therapy is  necessary at this time, given a threat to patient function, limb or life from the current condition. Supervision and attendance of Hyperbaric Oxygen Therapy provided. Continue HBO treatment as outlined in the treatment plan. Hyperbaric Oxygen: Micheline Steel tolerated Treatment Number: 10 well today without complications.     Discharge Instructions were explained and given to  Lianne Baumgarten     Electronically signed by Lauren Schroeder MD on 2/15/2022 at 9:06 PM

## 2022-02-17 ENCOUNTER — HOSPITAL ENCOUNTER (OUTPATIENT)
Dept: HYPERBARIC MEDICINE | Age: 62
Setting detail: THERAPIES SERIES
Discharge: HOME | End: 2022-02-17
Payer: COMMERCIAL

## 2022-02-17 VITALS
TEMPERATURE: 97 F | RESPIRATION RATE: 18 BRPM | SYSTOLIC BLOOD PRESSURE: 130 MMHG | HEART RATE: 84 BPM | DIASTOLIC BLOOD PRESSURE: 76 MMHG

## 2022-02-17 DIAGNOSIS — L97.424 DIABETIC ULCER OF LEFT HEEL ASSOCIATED WITH TYPE 2 DIABETES MELLITUS, WITH NECROSIS OF BONE (HCC): Primary | ICD-10-CM

## 2022-02-17 DIAGNOSIS — E11.621 DIABETIC ULCER OF LEFT HEEL ASSOCIATED WITH TYPE 2 DIABETES MELLITUS, WITH NECROSIS OF BONE (HCC): Primary | ICD-10-CM

## 2022-02-17 LAB
METER GLUCOSE: 165 MG/DL (ref 74–99)
METER GLUCOSE: 86 MG/DL (ref 74–99)

## 2022-02-17 PROCEDURE — 82962 GLUCOSE BLOOD TEST: CPT

## 2022-02-17 PROCEDURE — G0277 HBOT, FULL BODY CHAMBER, 30M: HCPCS

## 2022-02-17 PROCEDURE — 99183 HYPERBARIC OXYGEN THERAPY: CPT | Performed by: SURGERY

## 2022-02-18 ENCOUNTER — HOSPITAL ENCOUNTER (OUTPATIENT)
Dept: HYPERBARIC MEDICINE | Age: 62
Setting detail: THERAPIES SERIES
Discharge: HOME | End: 2022-02-18
Payer: COMMERCIAL

## 2022-02-18 VITALS
TEMPERATURE: 97.9 F | RESPIRATION RATE: 18 BRPM | HEART RATE: 80 BPM | SYSTOLIC BLOOD PRESSURE: 140 MMHG | DIASTOLIC BLOOD PRESSURE: 60 MMHG

## 2022-02-18 DIAGNOSIS — L97.424 DIABETIC ULCER OF LEFT HEEL ASSOCIATED WITH TYPE 2 DIABETES MELLITUS, WITH NECROSIS OF BONE (HCC): Primary | ICD-10-CM

## 2022-02-18 DIAGNOSIS — L97.424 ULCER OF LEFT HEEL, WITH NECROSIS OF BONE (HCC): ICD-10-CM

## 2022-02-18 DIAGNOSIS — E11.621 DIABETIC ULCER OF LEFT HEEL ASSOCIATED WITH TYPE 2 DIABETES MELLITUS, WITH NECROSIS OF BONE (HCC): Primary | ICD-10-CM

## 2022-02-18 LAB
METER GLUCOSE: 107 MG/DL (ref 74–99)
METER GLUCOSE: 140 MG/DL (ref 74–99)

## 2022-02-18 PROCEDURE — G0277 HBOT, FULL BODY CHAMBER, 30M: HCPCS

## 2022-02-18 PROCEDURE — 82962 GLUCOSE BLOOD TEST: CPT

## 2022-02-18 PROCEDURE — 99183 HYPERBARIC OXYGEN THERAPY: CPT | Performed by: SURGERY

## 2022-02-18 NOTE — PROGRESS NOTES
[address] [place] [address],[address]  Hyperbaric Oxygen Therapy   Progress Note    NAME: Homer Huntley  MEDICAL RECORD NUMBER:  [id number]  AGE: 57 y.o. GENDER: male  : 1960  EPISODE DATE:  2022   Subjective   HBO Treatment Number: 13 out of Total Treatments: 30  HBO Diagnosis:   Problem List Items Addressed This Visit     Ulcer of left heel, with necrosis of bone ([address]    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 2 diabetes mellitus, with necrosis of bone ([address] - Primary (Chronic)    Relevant Orders    Hypoglycemial protocol    POCT glucose    Care order/instruction    Care order/instruction    Notify physician (specify)    Hyperbaric Oxygen Therapy        Safety checks performed prior to treatment. See doc flowsheets for documentation. Objective         Recent Labs     22  1328 22  1620   GLUMET 140* 107*     Pre treatment Vital Signs       Temp: 97.5 °F (36.4 °C)     Pulse: 84     Resp: 18     BP: (!) 142/54     Post treatment Vital Signs  Temp: 97.9 °F (36.6 °C)  Pulse: 80  Resp: 18  BP: (!) 140/60  Assessment      Physical Exam:  General Appearance:  alert and oriented to person, place and time, well-developed and well-nourished, in no acute distress  ENT:  tympanic membranes intact bilaterally  Pulmonary/Chest:  clear to auscultation bilaterally- no wheezes, rales or rhonchi, normal air movement, no respiratory distress  Cardiovascular:  regular rate and rhythm  Chamber #: 36  Treatment Start Time: 1427     Pressure Reached Time:   YOLANDA : 2  Number of Air Breaks:  Treatment Status: No Air break     Decompression Time: 1608   Treatment End Time: 5916  Length of Treatment: 90 Minutes  Symptoms Noted During Treatment: None  Total Treatment Time (min): 110    Adverse Event: no    I was present on these premises and immediately available to furnish assistance & direction throughout the procedure.    Plan      Homer Huntley is a 57 y.o. male  did successfully complete today's hyperbaric oxygen treatment at [address] [place] [address]. In my clinical judgement, ongoing HBO therapy is  necessary at this time, given a threat to patient function, limb or life from the current condition. Supervision and attendance of Hyperbaric Oxygen Therapy provided. Continue HBO treatment as outlined in the treatment plan. Hyperbaric Oxygen: Henrik Steel tolerated Treatment Number: 68 well today without complications.     Discharge Instructions were explained and given to Mr. Austin Deng     Electronically signed by Vicki Gaston MD on 2/18/2022 at 4:52 PM

## 2022-02-18 NOTE — PROGRESS NOTES
[facility]  Hyperbaric Oxygen Therapy   Progress Note    NAME: Nancy Lira  MEDICAL RECORD NUMBER:  [id number]  AGE: 57 y.o. GENDER: male  : 1960  EPISODE DATE:  2022   Subjective   HBO Treatment Number: 12 out of Total Treatments: 30  HBO Diagnosis:   Problem List Items Addressed This Visit     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 2 diabetes mellitus, with necrosis of bone ([address] - Primary (Chronic)    Relevant Orders    POCT glucose        Safety checks performed prior to treatment. See doc flowsheets for documentation. Objective         Recent Labs     22  1328 22  1634   GLUMET 165* 86     Pre treatment Vital Signs       Temp: 97 °F (36.1 °C)     Pulse: 84     Resp: 18     BP: 130/76     Post treatment Vital Signs  Temp: 97 °F (36.1 °C)  Pulse: 84  Resp: 18  BP: 130/76  Assessment      Physical Exam:  General Appearance:  alert and oriented to person, place and time, well-developed and well-nourished, in no acute distress  ENT:  tympanic membranes intact bilaterally  Pulmonary/Chest:  clear to auscultation bilaterally- no wheezes, rales or rhonchi, normal air movement, no respiratory distress  Cardiovascular:  regular rate and rhythm  Chamber #: 36  Treatment Start Time: 1443     Pressure Reached Time: 1453  YOLANDA : 2  Number of Air Breaks:  Treatment Status: No Air break     Decompression Time: 1625   Treatment End Time: 2713  Length of Treatment: 90 Minutes  Symptoms Noted During Treatment: None  Total Treatment Time (min): 109    Adverse Event: no    I was present on these premises and immediately available to furnish assistance & direction throughout the procedure. Plan      Nancy Lira is a 57 y.o. male  did successfully complete today's hyperbaric oxygen treatment at [address] [place] [address].     In my clinical judgement, ongoing HBO therapy is  necessary at this time, given a threat to patient function, limb or life from the current condition. Supervision and attendance of Hyperbaric Oxygen Therapy provided. Continue HBO treatment as outlined in the treatment plan. Hyperbaric Oxygen: Lewis Steel tolerated Treatment Number: 12 well today without complications.     Discharge Instructions were explained and given to Mr. Izzy Alejandre     Electronically signed by Vonnie Daley MD on 2/17/2022 at 6:05 AM

## 2022-02-22 ENCOUNTER — HOSPITAL ENCOUNTER (OUTPATIENT)
Dept: HYPERBARIC MEDICINE | Age: 62
Setting detail: THERAPIES SERIES
Discharge: HOME | End: 2022-02-22
Payer: COMMERCIAL

## 2022-02-22 VITALS
TEMPERATURE: 97.1 F | RESPIRATION RATE: 17 BRPM | DIASTOLIC BLOOD PRESSURE: 66 MMHG | SYSTOLIC BLOOD PRESSURE: 134 MMHG | HEART RATE: 79 BPM

## 2022-02-22 DIAGNOSIS — L97.424 DIABETIC ULCER OF LEFT HEEL ASSOCIATED WITH TYPE 2 DIABETES MELLITUS, WITH NECROSIS OF BONE (HCC): Primary | ICD-10-CM

## 2022-02-22 DIAGNOSIS — E11.621 DIABETIC ULCER OF LEFT HEEL ASSOCIATED WITH TYPE 2 DIABETES MELLITUS, WITH NECROSIS OF BONE (HCC): Primary | ICD-10-CM

## 2022-02-22 DIAGNOSIS — L97.424 ULCER OF LEFT HEEL, WITH NECROSIS OF BONE (HCC): ICD-10-CM

## 2022-02-22 LAB
METER GLUCOSE: 115 MG/DL (ref 74–99)
METER GLUCOSE: 168 MG/DL (ref 74–99)

## 2022-02-22 PROCEDURE — G0277 HBOT, FULL BODY CHAMBER, 30M: HCPCS

## 2022-02-22 PROCEDURE — 99183 HYPERBARIC OXYGEN THERAPY: CPT | Performed by: SURGERY

## 2022-02-22 PROCEDURE — 82962 GLUCOSE BLOOD TEST: CPT

## 2022-02-22 RX ORDER — TRAMADOL HYDROCHLORIDE 50 MG/1
50 TABLET ORAL 2 TIMES DAILY
COMMUNITY
End: 2022-05-11 | Stop reason: SDUPTHER

## 2022-02-22 NOTE — PROGRESS NOTES
[facility]  Hyperbaric Oxygen Therapy   Progress Note    NAME: Annie Crooks  MEDICAL RECORD NUMBER:  [id number]  AGE: 57 y.o. GENDER: male  : 1960  EPISODE DATE:  2022   Subjective   HBO Treatment Number: 14 out of Total Treatments: 30  HBO Diagnosis:   Problem List Items Addressed This Visit     Ulcer of left heel, with necrosis of bone ([address]    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 2 diabetes mellitus, with necrosis of bone ([address] - Primary (Chronic)    Relevant Orders    Hypoglycemial protocol    POCT glucose    Care order/instruction    Notify physician (specify)    Hyperbaric Oxygen Therapy        Safety checks performed prior to treatment. See doc flowsheets for documentation. Objective         Recent Labs     22  1343 22  1646   GLUMET 168* 115*     Pre treatment Vital Signs       Temp: 97.3 °F (36.3 °C)     Pulse: 72     Resp: 16     BP: 114/60     Post treatment Vital Signs  Temp: 97.3 °F (36.3 °C)  Pulse: 72  Resp: 16  BP: 114/60  Assessment      Physical Exam:  General Appearance:  alert and oriented to person, place and time, well-developed and well-nourished, in no acute distress  ENT:  tympanic membranes intact bilaterally  Pulmonary/Chest:  clear to auscultation bilaterally- no wheezes, rales or rhonchi, normal air movement, no respiratory distress  Cardiovascular:  regular rate and rhythm  Chamber #: 36  Treatment Start Time: 1448     Pressure Reached Time: 1456  YOLANDA : 2  Number of Air Breaks:  Treatment Status: No Air break     Decompression Time: 1626   Treatment End Time: 3746  Length of Treatment: 90 Minutes  Symptoms Noted During Treatment: None  Total Treatment Time (min): 116    Adverse Event: no    I was present on these premises and immediately available to furnish assistance & direction throughout the procedure.    Plan      Annie Crooks is a 57 y.o. male  did successfully complete today's hyperbaric oxygen treatment at [address] [place] [address]. In my clinical judgement, ongoing HBO therapy is  necessary at this time, given a threat to patient function, limb or life from the current condition. Supervision and attendance of Hyperbaric Oxygen Therapy provided. Continue HBO treatment as outlined in the treatment plan. Hyperbaric Oxygen: Heike Steel tolerated Treatment Number: 14 well today without complications.     Discharge Instructions were explained and given to Mr. Yobany Mai     Electronically signed by Lorrie Mas MD on 2/22/2022 at 5:06 PM

## 2022-02-23 ENCOUNTER — HOSPITAL ENCOUNTER (OUTPATIENT)
Dept: HYPERBARIC MEDICINE | Age: 62
Setting detail: THERAPIES SERIES
Discharge: HOME | End: 2022-02-23
Payer: COMMERCIAL

## 2022-02-23 VITALS
DIASTOLIC BLOOD PRESSURE: 60 MMHG | HEART RATE: 76 BPM | RESPIRATION RATE: 16 BRPM | SYSTOLIC BLOOD PRESSURE: 130 MMHG | TEMPERATURE: 97.6 F

## 2022-02-23 DIAGNOSIS — L97.424 DIABETIC ULCER OF LEFT HEEL ASSOCIATED WITH TYPE 2 DIABETES MELLITUS, WITH NECROSIS OF BONE (HCC): Primary | ICD-10-CM

## 2022-02-23 DIAGNOSIS — E11.621 DIABETIC ULCER OF LEFT HEEL ASSOCIATED WITH TYPE 2 DIABETES MELLITUS, WITH NECROSIS OF BONE (HCC): Primary | ICD-10-CM

## 2022-02-23 DIAGNOSIS — L97.424 ULCER OF LEFT HEEL, WITH NECROSIS OF BONE (HCC): ICD-10-CM

## 2022-02-23 LAB
METER GLUCOSE: 113 MG/DL (ref 74–99)
METER GLUCOSE: 127 MG/DL (ref 74–99)
METER GLUCOSE: 132 MG/DL (ref 74–99)

## 2022-02-23 PROCEDURE — G0277 HBOT, FULL BODY CHAMBER, 30M: HCPCS

## 2022-02-23 PROCEDURE — 99183 HYPERBARIC OXYGEN THERAPY: CPT | Performed by: SURGERY

## 2022-02-23 PROCEDURE — 82962 GLUCOSE BLOOD TEST: CPT

## 2022-02-24 ENCOUNTER — HOSPITAL ENCOUNTER (OUTPATIENT)
Dept: WOUND CARE | Age: 62
Discharge: HOME | End: 2022-02-24
Payer: COMMERCIAL

## 2022-02-24 ENCOUNTER — HOSPITAL ENCOUNTER (OUTPATIENT)
Dept: HYPERBARIC MEDICINE | Age: 62
Setting detail: THERAPIES SERIES
Discharge: HOME | End: 2022-02-24
Payer: COMMERCIAL

## 2022-02-24 VITALS
HEIGHT: 70 IN | HEART RATE: 88 BPM | SYSTOLIC BLOOD PRESSURE: 142 MMHG | DIASTOLIC BLOOD PRESSURE: 80 MMHG | RESPIRATION RATE: 18 BRPM | WEIGHT: 207 LBS | TEMPERATURE: 97.1 F | BODY MASS INDEX: 29.63 KG/M2

## 2022-02-24 VITALS
HEART RATE: 76 BPM | TEMPERATURE: 97.4 F | DIASTOLIC BLOOD PRESSURE: 40 MMHG | SYSTOLIC BLOOD PRESSURE: 138 MMHG | RESPIRATION RATE: 16 BRPM

## 2022-02-24 DIAGNOSIS — L97.424 DIABETIC ULCER OF LEFT HEEL ASSOCIATED WITH TYPE 2 DIABETES MELLITUS, WITH NECROSIS OF BONE (HCC): Primary | ICD-10-CM

## 2022-02-24 DIAGNOSIS — E11.621 DIABETIC ULCER OF LEFT HEEL ASSOCIATED WITH TYPE 2 DIABETES MELLITUS, WITH NECROSIS OF BONE (HCC): Primary | ICD-10-CM

## 2022-02-24 DIAGNOSIS — L08.9 DIABETIC FOOT INFECTION (HCC): ICD-10-CM

## 2022-02-24 DIAGNOSIS — L97.424 ULCER OF LEFT HEEL, WITH NECROSIS OF BONE (HCC): ICD-10-CM

## 2022-02-24 DIAGNOSIS — E11.628 DIABETIC FOOT INFECTION (HCC): ICD-10-CM

## 2022-02-24 LAB
METER GLUCOSE: 105 MG/DL (ref 74–99)
METER GLUCOSE: 112 MG/DL (ref 74–99)
METER GLUCOSE: 143 MG/DL (ref 74–99)

## 2022-02-24 PROCEDURE — 6370000000 HC RX 637 (ALT 250 FOR IP): Performed by: PODIATRIST

## 2022-02-24 PROCEDURE — G0277 HBOT, FULL BODY CHAMBER, 30M: HCPCS

## 2022-02-24 PROCEDURE — 82962 GLUCOSE BLOOD TEST: CPT

## 2022-02-24 PROCEDURE — 99183 HYPERBARIC OXYGEN THERAPY: CPT | Performed by: SURGERY

## 2022-02-24 PROCEDURE — 11042 DBRDMT SUBQ TIS 1ST 20SQCM/<: CPT

## 2022-02-24 RX ORDER — BETAMETHASONE DIPROPIONATE 0.05 %
OINTMENT (GRAM) TOPICAL ONCE
Status: CANCELLED | OUTPATIENT
Start: 2022-02-24 | End: 2022-02-24

## 2022-02-24 RX ORDER — LIDOCAINE 40 MG/G
CREAM TOPICAL ONCE
Status: CANCELLED | OUTPATIENT
Start: 2022-02-24 | End: 2022-02-24

## 2022-02-24 RX ORDER — GINSENG 100 MG
CAPSULE ORAL ONCE
Status: CANCELLED | OUTPATIENT
Start: 2022-02-24 | End: 2022-02-24

## 2022-02-24 RX ORDER — GENTAMICIN SULFATE 1 MG/G
OINTMENT TOPICAL ONCE
Status: CANCELLED | OUTPATIENT
Start: 2022-02-24 | End: 2022-02-24

## 2022-02-24 RX ORDER — CLOBETASOL PROPIONATE 0.5 MG/G
OINTMENT TOPICAL ONCE
Status: CANCELLED | OUTPATIENT
Start: 2022-02-24 | End: 2022-02-24

## 2022-02-24 RX ORDER — LIDOCAINE HYDROCHLORIDE 40 MG/ML
SOLUTION TOPICAL ONCE
Status: CANCELLED | OUTPATIENT
Start: 2022-02-24 | End: 2022-02-24

## 2022-02-24 RX ORDER — BACITRACIN, NEOMYCIN, POLYMYXIN B 400; 3.5; 5 [USP'U]/G; MG/G; [USP'U]/G
OINTMENT TOPICAL ONCE
Status: CANCELLED | OUTPATIENT
Start: 2022-02-24 | End: 2022-02-24

## 2022-02-24 RX ORDER — LIDOCAINE 50 MG/G
OINTMENT TOPICAL ONCE
Status: CANCELLED | OUTPATIENT
Start: 2022-02-24 | End: 2022-02-24

## 2022-02-24 RX ORDER — LIDOCAINE HYDROCHLORIDE 20 MG/ML
JELLY TOPICAL ONCE
Status: CANCELLED | OUTPATIENT
Start: 2022-02-24 | End: 2022-02-24

## 2022-02-24 RX ORDER — BACITRACIN ZINC AND POLYMYXIN B SULFATE 500; 1000 [USP'U]/G; [USP'U]/G
OINTMENT TOPICAL ONCE
Status: CANCELLED | OUTPATIENT
Start: 2022-02-24 | End: 2022-02-24

## 2022-02-24 RX ORDER — LIDOCAINE HYDROCHLORIDE 40 MG/ML
SOLUTION TOPICAL ONCE
Status: COMPLETED | OUTPATIENT
Start: 2022-02-24 | End: 2022-02-24

## 2022-02-24 RX ADMIN — LIDOCAINE HYDROCHLORIDE 10 ML: 40 SOLUTION TOPICAL at 11:32

## 2022-02-24 ASSESSMENT — PAIN SCALES - GENERAL: PAINLEVEL_OUTOF10: 0

## 2022-02-24 NOTE — PROGRESS NOTES
Wound Healing Center Followup Visit Note    Referring Physician : Abi Chavarria MD  [id number] RECORD NUMBER:  [id number]  AGE: 57 y.o. GENDER: male  : 1960  EPISODE DATE:  2022    Subjective:     Chief Complaint   Patient presents with    Wound Check     left foot      HISTORY of PRESENT ILLNESS HPI   Fazal Sykes is a 57 y.o. male who presents today in regards to follow up evaluation and treatment of wound/ulcer. That patient's past medical, family and social hx were reviewed and changes were made if present. History of Wound Context:  The patient has had a wound of bilateral heels which was first noted approximately over a month ago. This has been treated with surgical debridement. On their initial visit to the wound healing center, 21 ,  the patient has noted that the wound has not been improving. The patient has had similar previous wounds in the past.       Pt is not on abx at time of initial visit.     3/22/21 - Wound VAC MWF continue to left heel. Aquacel Ag to right heel. Debrided toenails 1-5 in thickness and length. FU 1 week. IV Abx per Dr. Hailee Nuñez.     21 - Wound VAC MWF to left heel. Cultures obtained. FU 1 week after visit with Dr. Hailee Nuñez. PO Abx.  21 - DC wound VAC to left heel. Alginate to wound. FU 1 week  21 - Alginate and gentamicin ointment QOD. Partial WB to heel at 50% to foot with surgical shoe and walker.     5/3/21 - Alginate and gentamicin ointment QOD. MRI left heel     5/10/21 - Alginate and gentamicin ointment QOD. MRI left heel pending.     21 - Alginate and gentamicin ointment QOD. MRI reviewed confirming OM. Discussed HBOT referral and partial calcanectomy. He opts for HBOT consultation.     21 - Alginate and add gentamicin ointment QD. HBOT referral.  Patient hold off on partial calcanectomy. FU 1 week     21 - Alginate and add gentamicin ointment QD.   HBOT referral.  Patient hold off on partial calcanectomy. FU 1 week     6/28/21 - Alginate and add gentamicin ointment QD. HBOT referral.  Patient hold off on partial calcanectomy. FU 1 week. Patient gave me consent (verbal) to speak with his brother Neto Cordon regarding his at home arrangement. HBOT is on hold until patient is at home.     7/12/21 -  Alginate and add gentamicin ointment QD. HBOT referral.  Patient considering partial calcanectomy with flap. FU 1 week.      7/19/21 - Alginate and add gentamicin ointment QD. HBOT referral.  Patient considering partial calcanectomy with flap. FU 1 week.      8/2/21 - Alginate and add gentamicin ointment QD. HBOT referral.  Patient considering partial calcanectomy with flap. FU 1 week.      8/16/21 - Alginate and add gentamicin ointment QD. HBOT referral.  Patient considering partial calcanectomy with flap. FU 1 week.      8/23/21 - Alginate and add gentamicin ointment QD. HBOT referral.  Patient considering partial calcanectomy with flap. FU 1 week.      8/30/21 - Alginate and add gentamicin ointment QD. HBOT referral.  Patient considering partial calcanectomy with flap. FU 1 week.      9/20/21 - Alginate and add gentamicin ointment QD. HBOT referral.  Patient considering partial calcanectomy with flap. FU 1 week.      10/25/21 Alginate and add gentamicin ointment QD. HBOT referral.  Patient considering partial calcanectomy with flap. FU 1 week.      11/1/21 Alginate and add gentamicin ointment QD. HBOT referral.  Patient considering partial calcanectomy with flap. FU 1 week.      11/8/21 - Alginate and add gentamicin ointment QD. HBOT referral.  Patient considering partial calcanectomy with flap. FU 1 week.      11/15/21 - Alginate and add gentamicin ointment QD. HBOT referral.  Patient considering partial calcanectomy with flap. FU 1 week.      11/22/21 -  Alginate and add gentamicin ointment QD. HBOT referral.  Patient considering partial calcanectomy with flap. FU 1 week.    11/29/21 0 Alginate and gentamicin ointment. Possible DC home. HBOT referral.  Patient considering partial calcanectomy with flap. FU 1 week.      12/6/21 - Alginate and gentamicin ointment. Possible DC home. HBOT referral.  Patient considering partial calcanectomy with flap. FU 1 week.      12/13/21 - Alginate and gentamicin ointment. DC home. HBOT referral.  [facility]  referral for wound care and PT for GAIT training and safe DME use. Patient considering partial calcanectomy with flap. FU 1 week.      12/20/21 -  Alginate and gentamicin ointment. DC home. HBOT referral.     12/27/21 - HBOT consultation complete - to start HBOT after new year. 2-14-22 patient presents today for evaluation of a left heel wound. Patient transferred here to [facility] care [facility] due to the fact that he is doing HBO, for convenience due to transportation. He was prior following with Dr. Waylon Mart. Patient overall doing well, no complaints. Tolerating dressings as well as hyperbarics. Physical exam demonstrates vascular intact. Wound appreciated posterior aspect with areas of devitalized nonviable tissue with beefy tissue noted as well. There is no purulence, odor, erythema or increase in temperature. Currently no exposed bone. 2-24-22  Patient overall doing well, no complaints. Tolerating dressings as well as hyperbarics. Physical exam demonstrates vascular intact. Wound appreciated posterior aspect with areas of devitalized nonviable tissue with beefy tissue noted as well. There is no purulence, odor, erythema or increase in temperature.        Wound/Ulcer Pain Timing/Severity: none  Quality of pain: N/A  Severity:  0 / 10   Modifying Factors: None  Associated Signs/Symptoms: none     Ulcer Identification:  Ulcer Type: arterial and diabetic  Contributing Factors: diabetes     Diabetic/Pressure/Non Pressure Ulcers onl y:        PAST MEDICAL HISTORY      Diagnosis Date    Cerebral artery occlusion with cerebral infarction ([address]     Diabetes mellitus ([address]     Type 2    Diabetic foot ulcer with osteomyelitis ([address] 12/21/2021    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 2 diabetes mellitus, with necrosis of bone ([address] 12/21/2021    Hemiparesis affecting left side as late effect of cerebrovascular accident ([address]     LEFT SIDE NON DOMINANT FOLLOWING STROKE    Hypertension     Peripheral vascular angioplasty status 12/21/2021    Ulcer of left heel, with necrosis of bone ([address] 12/27/2021    Ulcer of left heel, with necrosis of muscle ([address] 12/21/2021     Past Surgical History:   Procedure Laterality Date    FINGER AMPUTATION      FOOT DEBRIDEMENT Left 2/16/2021    LEFT FOOT DEBRIDEMENT WITH BONE BIOPSY, WOUND VAC APPLICATION performed by Luis Alfredo Bee DPM at [place] [address] ARTHROSCOPY      TONSILLECTOMY      TRANSESOPHAGEAL ECHOCARDIOGRAM N/A 2/22/2021    TRANSESOPHAGEAL ECHOCARDIOGRAM WITH BUBBLE STUDY performed by Sammi Pagan MD at [facility] N/A 1/4/2021    EGD BIOPSY performed by Allie Kellogg DO at [facility] reviewed. No pertinent family history. Social History     Tobacco Use    Smoking status: Former Smoker    Smokeless tobacco: Never Used   Vaping Use    Vaping Use: Never used   Substance Use Topics    Alcohol use: Not Currently     Comment: Former every day drinker for most of adult life    Drug use: No     Allergies   Allergen Reactions    Pcn [Penicillins]      Current Outpatient Medications on File Prior to Encounter   Medication Sig Dispense Refill    traMADol (ULTRAM) 50 MG tablet Take 50 mg by mouth 2 times daily.  gabapentin (NEURONTIN) 100 MG capsule Take 100 mg by mouth 2 times daily.        B Complex-C-E-Zn (STRESS B/ZINC) TABS Take 1 tablet by mouth daily      ferrous sulfate (IRON 325) 325 (65 Fe) MG tablet Take 325 mg by mouth daily (with breakfast)      vitamin D (ERGOCALCIFEROL) 1.25 MG (78366 UT) CAPS capsule Take 1 capsule by mouth once a week 5 capsule 0    melatonin 3 MG TABS tablet Take 9 mg by mouth nightly as needed      aspirin 81 MG EC tablet Take 81 mg by mouth daily      hydroCHLOROthiazide (HYDRODIURIL) 25 MG tablet Take 25 mg by mouth daily      metFORMIN (GLUCOPHAGE) 500 MG tablet Take 1 tablet by mouth 2 times daily (with meals) 60 tablet 3    atorvastatin (LIPITOR) 40 MG tablet Take 1 tablet by mouth nightly 30 tablet 3    clopidogrel (PLAVIX) 75 MG tablet Take 1 tablet by mouth daily 30 tablet 3    diphenhydrAMINE (BENADRYL ALLERGY) 25 MG capsule Take 25 mg by mouth every 6 hours as needed for Itching      acetaminophen (TYLENOL) 325 MG tablet Take 650 mg by mouth every 6 hours as needed for Pain       No current facility-administered medications on file prior to encounter. REVIEW OF SYSTEMS See HPI    Objective:    BP (!) 142/80   Pulse 88   Temp 97.1 °F (36.2 °C) (Temporal)   Resp 18   Ht 5' 10\" (1.778 m)   Wt 207 lb (93.9 kg)   BMI 29.70 kg/m²   Wt Readings from Last 3 Encounters:   02/24/22 207 lb (93.9 kg)   02/14/22 207 lb (93.9 kg)   12/27/21 207 lb (93.9 kg)     PHYSICAL EXAM  CONSTITUTIONAL:   Awake, alert, cooperative   EYES:  lids and lashes normal   ENT: external ears and nose without lesions   NECK:  supple, symmetrical, trachea midline   SKIN:  Open wound     Assessment:     DIABETIC ULCER.     Pre Debridement Measurements:  Are located in the [facility]  Documentation Flow Sheet  Post Debridement Measurements:  Wound/Ulcer Descriptions are Pre Debridement except measurements:     Wound 12/26/20 Arm Left (Active)   Number of days: 424       Wound 02/15/21 Heel Left (Active)   Number of days: 374       Wound 02/15/21 Heel Right (Active)   Number of days: 374       Wound 03/08/21 Heel Left #1 left heel aquired 12/1/20 (Active)   Wound Image   02/14/22 1344   Wound Etiology Diabetic 02/14/22 1344   Dressing Status New dressing applied 02/24/22 6273 Wound Cleansed Cleansed with saline 02/24/22 1158   Dressing/Treatment Alginate;ABD;Dry dressing;Roll gauze 02/24/22 1158   Offloading for Diabetic Foot Ulcers Post op shoe 02/24/22 1158   Wound Length (cm) 3.4 cm 02/24/22 1128   Wound Width (cm) 3 cm 02/24/22 1128   Wound Depth (cm) 0.7 cm 02/24/22 1128   Wound Surface Area (cm^2) 10.2 cm^2 02/24/22 1128   Change in Wound Size % (l*w) 15.63 02/24/22 1128   Wound Volume (cm^3) 7.14 cm^3 02/24/22 1128   Wound Healing % 16 02/24/22 1128   Post-Procedure Length (cm) 3.4 cm 02/24/22 1139   Post-Procedure Width (cm) 2.8 cm 02/24/22 1139   Post-Procedure Depth (cm) 0.8 cm 02/24/22 1139   Post-Procedure Surface Area (cm^2) 9.52 cm^2 02/24/22 1139   Post-Procedure Volume (cm^3) 7.616 cm^3 02/24/22 1139   Undermining Starts ___ O'Clock 6 02/24/22 1128   Undermining Ends___ O'Clock 10 02/24/22 1128   Undermining Maxium Distance (cm) 0.2 02/24/22 1128   Wound Assessment Fibrin;Pale granulation tissue 02/24/22 1128   Drainage Amount Moderate 02/24/22 1128   Drainage Description Yellow 02/24/22 1128   Odor None 02/24/22 1128   Adrianne-wound Assessment Blanchable erythema; Maceration 02/24/22 1128   Number of days: 353       Wound 03/08/21 Heel Right #2 rt heel aquired 12/1/20 (Active)   Wound Image   04/05/21 0929   Dressing Status New dressing applied 03/18/21 1352   Wound Cleansed Cleansed with saline 04/01/21 1536   Dressing/Treatment Alginate;Collagen;Silicone border 61/06/71 1536   Offloading for Diabetic Foot Ulcers Post op shoe 04/01/21 1536   Wound Length (cm) 0 cm 04/05/21 0929   Wound Width (cm) 0 cm 04/05/21 0929   Wound Depth (cm) 0 cm 04/05/21 0929   Wound Surface Area (cm^2) 0 cm^2 04/05/21 0929   Change in Wound Size % (l*w) 100 04/05/21 0929   Wound Volume (cm^3) 0 cm^3 04/05/21 0929   Wound Healing % 100 04/05/21 0929   Post-Procedure Length (cm) 0 cm 04/05/21 1011   Post-Procedure Width (cm) 0 cm 04/05/21 1011   Post-Procedure Depth (cm) 0 cm 04/05/21 1011 Post-Procedure Surface Area (cm^2) 0 cm^2 04/05/21 1011   Post-Procedure Volume (cm^3) 0 cm^3 04/05/21 1011   Wound Assessment Fibrin 04/01/21 1357   Drainage Amount None 04/01/21 1357   Drainage Description Yellow 03/22/21 0911   Odor None 04/01/21 1357   Adrianne-wound Assessment Maceration 04/01/21 1357   Number of days: 353     Incision 02/19/21 Groin Right (Active)   Number of days: 369       Procedure Note  Indications:  Based on my examination of this patient's wound(s)/ulcer(s) today, debridement is required to promote healing and evaluate the wound base. Performed by: Sandi Vega DPM    Consent obtained:  Yes    Time out taken:  Yes    Pain Control: Anesthetic  Anesthetic: 4% Lidocaine Liquid Topical     Debridement:Excisional Debridement    Using curette the wound(s)/ulcer(s) was/were sharply debrided down through and including the removal of subcutaneous tissue. Devitalized Tissue Debrided:  fibrin, biofilm, slough and necrotic/eschar to stimulate bleeding to promote healing, post debridement good bleeding base and wound edges noted    Wound/Ulcer #: 1    Percent of Wound/Ulcer Debrided: 100%    Total Surface Area Debrided:  10.2 sq cm     Estimated Blood Loss:  Minimal  Hemostasis Achieved:  by pressure    Procedural Pain:  0  / 10   Post Procedural Pain:  0 / 10     Response to treatment:  Well tolerated by patient. Plan:   Treatment Note please see attached Discharge Instructions    Written patient dismissal instructions given to patient and signed by patient or POA.          Discharge Instructions       Discharge condition: Stable     Assessment of pain at discharge: moderate     Anesthetic used: 4% liquid lidocaine solution      Discharge to: ECF     Left via:ambulance     Accompanied by: self     [facility]      Dressing Orders:  Cleanse left heel ulcer with normal saline solution apply first gentamicin 0.1% ointment and cover with plain alginate and dry dressing and change every other day or as needed for drainage.  .      Treatment Orders:    Continue antibiotics per Dr Jerzy Vinson and Cipro for suppression. Fairy Gehrig prevalon boots or heel ayo while in bed.          May be partial weight bearing up to 50% on left with surgical shoe.     HBO consult ordered     Consider surgical debridement of infected bone in heel with skin graft.     Start PT evaluate and treat as indicated three times a week.     Dr Rangel Parson suggesting wound vac while in HBO?     [address] [place] [address],[address] followup visit: _____ dr Wyatt Savers next avaliable _______________ 1 weeks Dr Harrell/Marleen____________________________  (Please note your next appointment above and if you are unable to keep, kindly give a 24 hour notice.  Thank you.)     Physician signature:__________________________        If you experience any of the following, please call the [facility] during business hours:     * Increase in Pain  * Temperature over 101  * Increase in drainage from your wound  * Drainage with a foul odor  * Bleeding  * Increase in swelling  * Need for compression bandage changes due to slippage, breakthrough drainage.     If you need medical attention outside of the business hours of the [facility] please contact your PCP or go to the nearest emergency room.                                                                                                                                        Electronically signed by Al Phoenix DPM on 2/24/2022 at 12:00 PM

## 2022-02-25 ENCOUNTER — HOSPITAL ENCOUNTER (OUTPATIENT)
Dept: HYPERBARIC MEDICINE | Age: 62
Setting detail: THERAPIES SERIES
Discharge: HOME | End: 2022-02-25
Payer: COMMERCIAL

## 2022-02-25 VITALS
RESPIRATION RATE: 18 BRPM | SYSTOLIC BLOOD PRESSURE: 144 MMHG | TEMPERATURE: 96.5 F | DIASTOLIC BLOOD PRESSURE: 88 MMHG | HEART RATE: 79 BPM

## 2022-02-25 DIAGNOSIS — E11.621 DIABETIC ULCER OF LEFT HEEL ASSOCIATED WITH TYPE 2 DIABETES MELLITUS, WITH NECROSIS OF BONE (HCC): Primary | ICD-10-CM

## 2022-02-25 DIAGNOSIS — L97.424 DIABETIC ULCER OF LEFT HEEL ASSOCIATED WITH TYPE 2 DIABETES MELLITUS, WITH NECROSIS OF BONE (HCC): Primary | ICD-10-CM

## 2022-02-25 DIAGNOSIS — L97.424 ULCER OF LEFT HEEL, WITH NECROSIS OF BONE (HCC): ICD-10-CM

## 2022-02-25 LAB
METER GLUCOSE: 171 MG/DL (ref 74–99)
METER GLUCOSE: 90 MG/DL (ref 74–99)

## 2022-02-25 PROCEDURE — G0277 HBOT, FULL BODY CHAMBER, 30M: HCPCS

## 2022-02-25 PROCEDURE — 99183 HYPERBARIC OXYGEN THERAPY: CPT | Performed by: SURGERY

## 2022-02-25 PROCEDURE — 82962 GLUCOSE BLOOD TEST: CPT

## 2022-02-25 NOTE — PROGRESS NOTES
[facility]  Hyperbaric Oxygen Therapy   Progress Note    NAME: Deann Weaver  MEDICAL RECORD NUMBER:  [id number]  AGE: 57 y.o. GENDER: male  : 1960  EPISODE DATE:  2022   Subjective   HBO Treatment Number: 16 out of Total Treatments: 30  HBO Diagnosis:   Problem List Items Addressed This Visit     Ulcer of left heel, with necrosis of bone ([address]    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 2 diabetes mellitus, with necrosis of bone ([address] - Primary (Chronic)    Relevant Orders    Hypoglycemial protocol    POCT glucose    Care order/instruction    Care order/instruction    Notify physician (specify)    Hyperbaric Oxygen Therapy        Safety checks performed prior to treatment. See doc flowsheets for documentation. Objective         Recent Labs     22  1425 22  1638   GLUMET 143* 105*     Pre treatment Vital Signs       Temp: 97.3 °F (36.3 °C)     Pulse: 76     Resp: 18     BP: 114/64     Post treatment Vital Signs  Temp: 97.4 °F (36.3 °C)  Pulse: 76  Resp: 16  BP: (!) 138/40  Assessment      Physical Exam:  General Appearance:  alert and oriented to person, place and time, well-developed and well-nourished, in no acute distress  ENT:  tympanic membranes intact bilaterally  Pulmonary/Chest:  clear to auscultation bilaterally- no wheezes, rales or rhonchi, normal air movement, no respiratory distress  Cardiovascular:  regular rate and rhythm  Chamber #: 36  Treatment Start Time: 1446     Pressure Reached Time: 1445  YOLANDA : 2  Number of Air Breaks:  Treatment Status: No Air break     Decompression Time: 1621   Treatment End Time: 1631  Length of Treatment: 90 Minutes  Symptoms Noted During Treatment: None  Total Treatment Time (min): 105    Adverse Event: no    I was present on these premises and immediately available to furnish assistance & direction throughout the procedure.    Jack      Deann Weaver is a 57 y.o. male  did successfully complete today's hyperbaric oxygen treatment at [address] [place] [address]. In my clinical judgement, ongoing HBO therapy is  necessary at this time, given a threat to patient function, limb or life from the current condition. Supervision and attendance of Hyperbaric Oxygen Therapy provided. Continue HBO treatment as outlined in the treatment plan. Hyperbaric Oxygen: Abraham Steel tolerated Treatment Number: 16 well today without complications.     Discharge Instructions were explained and given to Mr. Calvo [place]     Electronically signed by Isabel Stallworth MD on 2/24/2022 at 9:17 PM

## 2022-02-25 NOTE — PROGRESS NOTES
[facility]  Hyperbaric Oxygen Therapy   Progress Note    NAME: Bonny Huertas  MEDICAL RECORD NUMBER:  [id number]  AGE: 57 y.o. GENDER: male  : 1960  EPISODE DATE:  2022   Subjective   HBO Treatment Number: 15 out of Total Treatments: 30  HBO Diagnosis:   Problem List Items Addressed This Visit     Ulcer of left heel, with necrosis of bone ([address]    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 2 diabetes mellitus, with necrosis of bone ([address] - Primary (Chronic)    Relevant Orders    POCT glucose        Safety checks performed prior to treatment. See doc flowsheets for documentation. Objective         Recent Labs     22  1425 22  1638   GLUMET 143* 105*     Pre treatment Vital Signs       Temp: 97.4 °F (36.3 °C)     Pulse: 72     Resp: 16     BP: 128/64     Post treatment Vital Signs  Temp: 97.6 °F (36.4 °C)  Pulse: 76  Resp: 16  BP: 130/60  Assessment      Physical Exam:  General Appearance:  alert and oriented to person, place and time, well-developed and well-nourished, in no acute distress  ENT:  tympanic membranes intact bilaterally  Pulmonary/Chest:  clear to auscultation bilaterally- no wheezes, rales or rhonchi, normal air movement, no respiratory distress  Cardiovascular:  regular rate and rhythm  Chamber #: 36  Treatment Start Time: 1444     Pressure Reached Time: 1451  YOLANDA : 2  Number of Air Breaks:  Treatment Status: No Air break     Decompression Time: 1619   Treatment End Time: 1628  Length of Treatment: 90 Minutes  Symptoms Noted During Treatment: None  Total Treatment Time (min): 104    Adverse Event: no    I was present on these premises and immediately available to furnish assistance & direction throughout the procedure. Plan      Bonny Huertas is a 57 y.o. male  did successfully complete today's hyperbaric oxygen treatment at [address] [place] [address].     In my clinical judgement, ongoing HBO therapy is necessary at this time, given a threat to patient function, limb or life from the current condition. Supervision and attendance of Hyperbaric Oxygen Therapy provided. Continue HBO treatment as outlined in the treatment plan. Hyperbaric Oxygen: Blanka Steel tolerated Treatment Number: 15 well today without complications.     Discharge Instructions were explained and given to Mr. Sania Orozco     Electronically signed by Estela Cummings MD on 2/23/2022 at 8:59 PM

## 2022-02-26 NOTE — PROGRESS NOTES
[facility]  Hyperbaric Oxygen Therapy   Progress Note    NAME: Alejandro Nava  MEDICAL RECORD NUMBER:  [id number]  AGE: 57 y.o. GENDER: male  : 1960  EPISODE DATE:  2022   Subjective   HBO Treatment Number: 17 out of Total Treatments: 30  HBO Diagnosis:   Problem List Items Addressed This Visit     Ulcer of left heel, with necrosis of bone ([address]    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 2 diabetes mellitus, with necrosis of bone ([address] - Primary (Chronic)    Relevant Orders    POCT glucose        Safety checks performed prior to treatment. See doc flowsheets for documentation. Objective         Recent Labs     22  1306 22  1618   GLUMET 171* 90     Pre treatment Vital Signs       Temp: 97.4 °F (36.3 °C)     Pulse: 86     Resp: 18     BP: 111/72     Post treatment Vital Signs  Temp: 96.5 °F (35.8 °C)  Pulse: 79  Resp: 18  BP: (!) 144/88  Assessment      Physical Exam:  General Appearance:  alert and oriented to person, place and time, well-developed and well-nourished, in no acute distress  ENT:  tympanic membranes intact bilaterally  Pulmonary/Chest:  clear to auscultation bilaterally- no wheezes, rales or rhonchi, normal air movement, no respiratory distress  Cardiovascular:  regular rate and rhythm  Chamber #: 36  Treatment Start Time: 1426     Pressure Reached Time: 1436  YOLANDA : 2  Number of Air Breaks:  Treatment Status: No Air break     Decompression Time: 1606   Treatment End Time: 1616  Length of Treatment: 90 Minutes  Symptoms Noted During Treatment: None  Total Treatment Time (min): 110    Adverse Event: no    I was present on these premises and immediately available to furnish assistance & direction throughout the procedure. Plan      Alejandro Nava is a 57 y.o. male  did successfully complete today's hyperbaric oxygen treatment at [address] [place] [address].     In my clinical judgement, ongoing HBO therapy is necessary at this time, given a threat to patient function, limb or life from the current condition. Supervision and attendance of Hyperbaric Oxygen Therapy provided. Continue HBO treatment as outlined in the treatment plan. Hyperbaric Oxygen: Con Ivory tolerated Treatment Number: 37 well today without complications.     Discharge Instructions were explained and given to Mr. Ramón Wright     Electronically signed by Deepa Olivia MD on 2/25/2022 at 8:30 AM

## 2022-02-28 ENCOUNTER — HOSPITAL ENCOUNTER (OUTPATIENT)
Dept: HYPERBARIC MEDICINE | Age: 62
Discharge: HOME | End: 2022-02-28

## 2022-02-28 NOTE — PROGRESS NOTES
Spoke to patient over the phone at this time. Patient unable to keep HBO appt today d/t having another appt scheduled this afternoon.

## 2022-03-01 ENCOUNTER — APPOINTMENT (OUTPATIENT)
Dept: HYPERBARIC MEDICINE | Age: 62
End: 2022-03-01
Payer: COMMERCIAL

## 2022-03-02 ENCOUNTER — APPOINTMENT (OUTPATIENT)
Dept: HYPERBARIC MEDICINE | Age: 62
End: 2022-03-02
Payer: COMMERCIAL

## 2022-03-03 ENCOUNTER — HOSPITAL ENCOUNTER (OUTPATIENT)
Dept: HYPERBARIC MEDICINE | Age: 62
Setting detail: THERAPIES SERIES
Discharge: HOME | End: 2022-03-03
Payer: COMMERCIAL

## 2022-03-03 ENCOUNTER — HOSPITAL ENCOUNTER (OUTPATIENT)
Dept: WOUND CARE | Age: 62
Discharge: HOME | End: 2022-03-03
Payer: COMMERCIAL

## 2022-03-03 VITALS
DIASTOLIC BLOOD PRESSURE: 72 MMHG | SYSTOLIC BLOOD PRESSURE: 142 MMHG | RESPIRATION RATE: 18 BRPM | HEIGHT: 70 IN | HEART RATE: 86 BPM | BODY MASS INDEX: 29.63 KG/M2 | WEIGHT: 207 LBS | TEMPERATURE: 97 F

## 2022-03-03 VITALS
HEART RATE: 76 BPM | DIASTOLIC BLOOD PRESSURE: 64 MMHG | TEMPERATURE: 97 F | RESPIRATION RATE: 18 BRPM | SYSTOLIC BLOOD PRESSURE: 130 MMHG

## 2022-03-03 DIAGNOSIS — L08.9 DIABETIC FOOT INFECTION (HCC): ICD-10-CM

## 2022-03-03 DIAGNOSIS — E11.621 DIABETIC ULCER OF LEFT HEEL ASSOCIATED WITH TYPE 2 DIABETES MELLITUS, WITH NECROSIS OF BONE (HCC): Primary | ICD-10-CM

## 2022-03-03 DIAGNOSIS — L97.424 DIABETIC ULCER OF LEFT HEEL ASSOCIATED WITH TYPE 2 DIABETES MELLITUS, WITH NECROSIS OF BONE (HCC): Primary | ICD-10-CM

## 2022-03-03 DIAGNOSIS — E11.628 DIABETIC FOOT INFECTION (HCC): ICD-10-CM

## 2022-03-03 DIAGNOSIS — L97.424 ULCER OF LEFT HEEL, WITH NECROSIS OF BONE (HCC): ICD-10-CM

## 2022-03-03 LAB
METER GLUCOSE: 121 MG/DL (ref 74–99)
METER GLUCOSE: 132 MG/DL (ref 74–99)

## 2022-03-03 PROCEDURE — 99213 OFFICE O/P EST LOW 20 MIN: CPT

## 2022-03-03 PROCEDURE — G0277 HBOT, FULL BODY CHAMBER, 30M: HCPCS

## 2022-03-03 PROCEDURE — 11042 DBRDMT SUBQ TIS 1ST 20SQCM/<: CPT

## 2022-03-03 PROCEDURE — 6370000000 HC RX 637 (ALT 250 FOR IP): Performed by: PODIATRIST

## 2022-03-03 PROCEDURE — 99183 HYPERBARIC OXYGEN THERAPY: CPT | Performed by: SURGERY

## 2022-03-03 PROCEDURE — 87075 CULTR BACTERIA EXCEPT BLOOD: CPT

## 2022-03-03 PROCEDURE — 87070 CULTURE OTHR SPECIMN AEROBIC: CPT

## 2022-03-03 PROCEDURE — 82962 GLUCOSE BLOOD TEST: CPT

## 2022-03-03 RX ORDER — BACITRACIN ZINC AND POLYMYXIN B SULFATE 500; 1000 [USP'U]/G; [USP'U]/G
OINTMENT TOPICAL ONCE
Status: CANCELLED | OUTPATIENT
Start: 2022-03-03 | End: 2022-03-03

## 2022-03-03 RX ORDER — LIDOCAINE HYDROCHLORIDE 40 MG/ML
SOLUTION TOPICAL ONCE
Status: COMPLETED | OUTPATIENT
Start: 2022-03-03 | End: 2022-03-03

## 2022-03-03 RX ORDER — BETAMETHASONE DIPROPIONATE 0.05 %
OINTMENT (GRAM) TOPICAL ONCE
Status: CANCELLED | OUTPATIENT
Start: 2022-03-03 | End: 2022-03-03

## 2022-03-03 RX ORDER — LIDOCAINE HYDROCHLORIDE 20 MG/ML
JELLY TOPICAL ONCE
Status: CANCELLED | OUTPATIENT
Start: 2022-03-03 | End: 2022-03-03

## 2022-03-03 RX ORDER — CLOBETASOL PROPIONATE 0.5 MG/G
OINTMENT TOPICAL ONCE
Status: CANCELLED | OUTPATIENT
Start: 2022-03-03 | End: 2022-03-03

## 2022-03-03 RX ORDER — LIDOCAINE 40 MG/G
CREAM TOPICAL ONCE
Status: CANCELLED | OUTPATIENT
Start: 2022-03-03 | End: 2022-03-03

## 2022-03-03 RX ORDER — GENTAMICIN SULFATE 1 MG/G
OINTMENT TOPICAL ONCE
Status: CANCELLED | OUTPATIENT
Start: 2022-03-03 | End: 2022-03-03

## 2022-03-03 RX ORDER — LIDOCAINE HYDROCHLORIDE 40 MG/ML
SOLUTION TOPICAL ONCE
Status: CANCELLED | OUTPATIENT
Start: 2022-03-03 | End: 2022-03-03

## 2022-03-03 RX ORDER — LIDOCAINE 50 MG/G
OINTMENT TOPICAL ONCE
Status: CANCELLED | OUTPATIENT
Start: 2022-03-03 | End: 2022-03-03

## 2022-03-03 RX ORDER — GINSENG 100 MG
CAPSULE ORAL ONCE
Status: CANCELLED | OUTPATIENT
Start: 2022-03-03 | End: 2022-03-03

## 2022-03-03 RX ORDER — GENTAMICIN SULFATE 1 MG/G
CREAM TOPICAL
Qty: 30 G | Refills: 3 | Status: SHIPPED | OUTPATIENT
Start: 2022-03-03 | End: 2022-05-18 | Stop reason: ALTCHOICE

## 2022-03-03 RX ORDER — BACITRACIN, NEOMYCIN, POLYMYXIN B 400; 3.5; 5 [USP'U]/G; MG/G; [USP'U]/G
OINTMENT TOPICAL ONCE
Status: CANCELLED | OUTPATIENT
Start: 2022-03-03 | End: 2022-03-03

## 2022-03-03 RX ADMIN — LIDOCAINE HYDROCHLORIDE 3 ML: 40 SOLUTION TOPICAL at 11:25

## 2022-03-03 ASSESSMENT — PAIN DESCRIPTION - PROGRESSION: CLINICAL_PROGRESSION: NOT CHANGED

## 2022-03-03 ASSESSMENT — PAIN SCALES - GENERAL: PAINLEVEL_OUTOF10: 2

## 2022-03-03 ASSESSMENT — PAIN DESCRIPTION - ONSET: ONSET: ON-GOING

## 2022-03-03 ASSESSMENT — PAIN DESCRIPTION - PAIN TYPE: TYPE: CHRONIC PAIN

## 2022-03-03 ASSESSMENT — PAIN DESCRIPTION - LOCATION: LOCATION: FOOT

## 2022-03-03 ASSESSMENT — PAIN DESCRIPTION - ORIENTATION: ORIENTATION: LEFT

## 2022-03-03 ASSESSMENT — PAIN DESCRIPTION - DESCRIPTORS: DESCRIPTORS: SORE

## 2022-03-03 ASSESSMENT — PAIN DESCRIPTION - FREQUENCY: FREQUENCY: INTERMITTENT

## 2022-03-03 NOTE — PROGRESS NOTES
[facility]  Hyperbaric Oxygen Therapy   Progress Note    NAME: Arabella Steinberg  MEDICAL RECORD NUMBER:  [id number]  AGE: 57 y.o. GENDER: male  : 1960  EPISODE DATE:  3/3/2022   Subjective   HBO Treatment Number: 18 out of Total Treatments: 30  HBO Diagnosis:   Problem List Items Addressed This Visit     Ulcer of left heel, with necrosis of bone ([address]    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 2 diabetes mellitus, with necrosis of bone ([address] - Primary (Chronic)    Relevant Orders    Hypoglycemial protocol    POCT glucose    Care order/instruction    Notify physician (specify)    Hyperbaric Oxygen Therapy    Care order/instruction        Safety checks performed prior to treatment. See doc flowsheets for documentation. Objective         Recent Labs     22  1323 22  1617   GLUMET 132* 121*     Pre treatment Vital Signs       Temp: 96.9 °F (36.1 °C)     Pulse: 80     Resp: 18     BP: (!) 140/60     Post treatment Vital Signs  Temp: 97 °F (36.1 °C)  Pulse: 76  Resp: 18  BP: 130/64  Assessment      Physical Exam:  General Appearance:  alert and oriented to person, place and time, well-developed and well-nourished, in no acute distress  ENT:  tympanic membranes intact bilaterally  Pulmonary/Chest:  clear to auscultation bilaterally- no wheezes, rales or rhonchi, normal air movement, no respiratory distress  Cardiovascular:  regular rate and rhythm  Chamber #: 36  Treatment Start Time: 1427     Pressure Reached Time: 1435  YOLANDA : 2  Number of Air Breaks:  Treatment Status: No Air break     Decompression Time: 1605   Treatment End Time: 1615  Length of Treatment: 90 Minutes  Symptoms Noted During Treatment: None  Total Treatment Time (min): 108    Adverse Event: no    I was present on these premises and immediately available to furnish assistance & direction throughout the procedure.    Plan      Arabella Steinberg is a 57 y.o. male  did successfully complete today's hyperbaric oxygen treatment at [address] [place] [address]. In my clinical judgement, ongoing HBO therapy is  necessary at this time, given a threat to patient function, limb or life from the current condition. Supervision and attendance of Hyperbaric Oxygen Therapy provided. Continue HBO treatment as outlined in the treatment plan. Hyperbaric Oxygen: Abraham Steel tolerated Treatment Number: 43 well today without complications.     Discharge Instructions were explained and given to Theresa Lubna Mally     Electronically signed by Isabel Stallworth MD on 3/3/2022 at 4:41 PM

## 2022-03-03 NOTE — PLAN OF CARE
Problem: Pain:  Goal: Pain level will decrease  Description: Pain level will decrease  3/3/2022 1322 by Trever Talley RN  Outcome: Met This Shift  3/3/2022 1130 by Olga Gutierrez RN  Outcome: Ongoing  Goal: Control of chronic pain  Description: Control of chronic pain  3/3/2022 1322 by Trever Talley RN  Outcome: Met This Shift  3/3/2022 1130 by Olga Gutierrez RN  Outcome: Ongoing     Problem: Wound:  Goal: Will show signs of wound healing; wound closure and no evidence of infection  Description: Will show signs of wound healing; wound closure and no evidence of infection  3/3/2022 1322 by Trever Talley RN  Outcome: Ongoing  3/3/2022 1130 by Olga Gutierrez RN  Outcome: Ongoing     Problem: Blood Glucose:  Goal: Ability to maintain appropriate glucose levels will improve  Description: Ability to maintain appropriate glucose levels will improve  3/3/2022 1322 by Trever Talley RN  Outcome: Ongoing  3/3/2022 1130 by Olga Gutierrez RN  Outcome: Ongoing     Problem: Pain:  Goal: Control of acute pain  Description: Control of acute pain  3/3/2022 1130 by [id number] Huggins RN  Outcome: Ongoing

## 2022-03-03 NOTE — PROGRESS NOTES
gentamicin >=^16 mcg/mL Resistant ^4 mcg/mL Sensitive    levofloxacin >=^8 mcg/mL Resistant      meropenem <=^0.25 mcg/mL Sensitive      oxacillin   >=^4 mcg/mL Resistant    piperacillin-tazobactam ^16 mcg/mL Sensitive      tobramycin >=^16 mcg/mL Resistant      trimethoprim-sulfamethoxazole ^40 mcg/mL Sensitive >=^320 mcg/mL Resistant    vancomycin   ^1 mcg/mL Sensitive                PAST MEDICAL HISTORY      Diagnosis Date    Cerebral artery occlusion with cerebral infarction ([address]     Diabetes mellitus ([address]     Type 2    Diabetic foot ulcer with osteomyelitis ([address] 12/21/2021    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 2 diabetes mellitus, with necrosis of bone ([address] 12/21/2021    Hemiparesis affecting left side as late effect of cerebrovascular accident ([address]     LEFT SIDE NON DOMINANT FOLLOWING STROKE    Hypertension     Peripheral vascular angioplasty status 12/21/2021    Ulcer of left heel, with necrosis of bone ([address] 12/27/2021    Ulcer of left heel, with necrosis of muscle ([address] 12/21/2021     Past Surgical History:   Procedure Laterality Date    FINGER AMPUTATION      FOOT DEBRIDEMENT Left 2/16/2021    LEFT FOOT DEBRIDEMENT WITH BONE BIOPSY, WOUND VAC APPLICATION performed by Ortega Gongora DPM at [place] [address] ARTHROSCOPY      TONSILLECTOMY      TRANSESOPHAGEAL ECHOCARDIOGRAM N/A 2/22/2021    TRANSESOPHAGEAL ECHOCARDIOGRAM WITH BUBBLE STUDY performed by Sandra Us MD at [address] [place] [address] N/A 1/4/2021    EGD BIOPSY performed by Ciro Shone, DO at [place] [address]     No family history on file.   Social History     Tobacco Use    Smoking status: Former Smoker    Smokeless tobacco: Never Used   Vaping Use    Vaping Use: Never used   Substance Use Topics    Alcohol use: Not Currently     Comment: Former every day drinker for most of adult life    Drug use: No     Allergies   Allergen Reactions    Pcn [Penicillins]      Current Outpatient Medications on File Prior to Encounter   Medication Sig Dispense Refill    traMADol (ULTRAM) 50 MG tablet Take 50 mg by mouth 2 times daily.  diphenhydrAMINE (BENADRYL ALLERGY) 25 MG capsule Take 25 mg by mouth every 6 hours as needed for Itching      gabapentin (NEURONTIN) 100 MG capsule Take 100 mg by mouth 2 times daily.  B Complex-C-E-Zn (STRESS B/ZINC) TABS Take 1 tablet by mouth daily      acetaminophen (TYLENOL) 325 MG tablet Take 650 mg by mouth every 6 hours as needed for Pain      ferrous sulfate (IRON 325) 325 (65 Fe) MG tablet Take 325 mg by mouth daily (with breakfast)      vitamin D (ERGOCALCIFEROL) 1.25 MG (88796 UT) CAPS capsule Take 1 capsule by mouth once a week 5 capsule 0    melatonin 3 MG TABS tablet Take 9 mg by mouth nightly as needed      aspirin 81 MG EC tablet Take 81 mg by mouth daily      hydroCHLOROthiazide (HYDRODIURIL) 25 MG tablet Take 25 mg by mouth daily      metFORMIN (GLUCOPHAGE) 500 MG tablet Take 1 tablet by mouth 2 times daily (with meals) 60 tablet 3    atorvastatin (LIPITOR) 40 MG tablet Take 1 tablet by mouth nightly 30 tablet 3    clopidogrel (PLAVIX) 75 MG tablet Take 1 tablet by mouth daily 30 tablet 3     No current facility-administered medications on file prior to encounter. REVIEW OF SYSTEMS See HPI  Objective: There were no vitals taken for this visit.   Wt Readings from Last 3 Encounters:   03/03/22 207 lb (93.9 kg)   02/24/22 207 lb (93.9 kg)   02/14/22 207 lb (93.9 kg)     PHYSICAL EXAM  CONSTITUTIONAL:  nad  HEENT: AT/NC glasses  HEART: S1/S2  RS: CTAB  Post   ABD: SOFT NT/ND    EXT:  Trace EDEMA, left 2nd finger amp left le swelling hematoma no d/c skin dry   SKIN: Open wound Present post heel wound bed     Psych pleasant      Wound Care Documentation:  Wound 12/26/20 Arm Left (Active)   Number of days: 431       Wound 02/15/21 Heel Left (Active)   Number of days: 381       Wound 02/15/21 Heel Right (Active)   Number of days: 381 suppression      Cont  hbot    WOUND CARE GENT CREAM/ALGINATE ABD KERLEX  allergic to pcn   F/u 4 weeks     No orders of the defined types were placed in this encounter. Orders Placed This Encounter   Medications    gentamicin (GARAMYCIN) 0.1 % cream     Sig: Apply topically with each dressing change     Dispense:  30 g     Refill:  3       Plan:   Treatment Note please see attached Discharge Instructions    Written patient dismissal instructions given to patient and signed by patient or POA. Discharge Instructions       Discharge condition: Stable     Assessment of pain at discharge: moderate     Anesthetic used: 4% liquid lidocaine solution      Discharge to: ECF     Left via:ambulance     Accompanied by: self     [facility]      Dressing Orders:  Cleanse left heel ulcer with normal saline solution apply first gentamicin 0.1% ointment and cover with plain alginate and dry dressing and change every other day or as needed for drainage.  .      Treatment Orders:    Continue antibiotics per Dr Rich Rodriguez and Cipro for suppression.  Wear prevalon boots or heel ayo while in bed.          May be partial weight bearing up to 50% on left with surgical shoe.     HBO consult ordered     Consider surgical debridement of infected bone in heel with skin graft.     Start PT evaluate and treat as indicated three times a week.        [facility] followup visit: _____ dr Marbin Kay 1 month _______________ 1 weeks Dr Kirk____________________________  (Please note your next appointment above and if you are unable to keep, kindly give a 24 hour notice.  Thank you.)     Physician signature:__________________________        If you experience any of the following, please call the [address] [place] [address] during business hours:     * Increase in Pain  * Temperature over 101  * Increase in drainage from your wound  * Drainage with a foul odor  * Bleeding  * Increase in swelling  * Need for compression bandage changes due to slippage, breakthrough drainage.     If you need medical attention outside of the business hours of the [address] [place] [address] Road please contact your PCP or go to the nearest emergency room.                                                                                                                                                           Electronically signed by Irene Deluca MD on 3/3/2022 at 1:19 PM

## 2022-03-03 NOTE — PROGRESS NOTES
Wound Healing Center Followup Visit Note    Referring Physician : Sonal Watson MD  [id number] [facility] RECORD NUMBER:  [id number]  AGE: 57 y.o. GENDER: male  : 1960  EPISODE DATE:  3/3/2022    Subjective:     Chief Complaint   Patient presents with    Wound Check     left heel      HISTORY of PRESENT ILLNESS HPI   Soltiario Buchanan is a 57 y.o. male who presents today in regards to follow up evaluation and treatment of wound/ulcer. That patient's past medical, family and social hx were reviewed and changes were made if present. History of Wound Context:  The patient has had a wound of bilateral heels which was first noted approximately over a month ago. This has been treated with surgical debridement. On their initial visit to the wound healing center, 21 ,  the patient has noted that the wound has not been improving. The patient has had similar previous wounds in the past.       Pt is not on abx at time of initial visit.     3/22/21 - Wound VAC MWF continue to left heel. Aquacel Ag to right heel. Debrided toenails 1-5 in thickness and length. FU 1 week. IV Abx per Dr. Lo Whitman.     21 - Wound VAC MWF to left heel. Cultures obtained. FU 1 week after visit with Dr. Lo Whitman. PO Abx.  21 - DC wound VAC to left heel. Alginate to wound. FU 1 week  21 - Alginate and gentamicin ointment QOD. Partial WB to heel at 50% to foot with surgical shoe and walker.     5/3/21 - Alginate and gentamicin ointment QOD. MRI left heel     5/10/21 - Alginate and gentamicin ointment QOD. MRI left heel pending.     21 - Alginate and gentamicin ointment QOD. MRI reviewed confirming OM. Discussed HBOT referral and partial calcanectomy. He opts for HBOT consultation.     21 - Alginate and add gentamicin ointment QD. HBOT referral.  Patient hold off on partial calcanectomy. FU 1 week     21 - Alginate and add gentamicin ointment QD.   HBOT referral.  Patient hold off on partial calcanectomy. FU 1 week     6/28/21 - Alginate and add gentamicin ointment QD. HBOT referral.  Patient hold off on partial calcanectomy. FU 1 week. Patient gave me consent (verbal) to speak with his brother Terri Fowler regarding his at home arrangement. HBOT is on hold until patient is at home.     7/12/21 -  Alginate and add gentamicin ointment QD. HBOT referral.  Patient considering partial calcanectomy with flap. FU 1 week.      7/19/21 - Alginate and add gentamicin ointment QD. HBOT referral.  Patient considering partial calcanectomy with flap. FU 1 week.      8/2/21 - Alginate and add gentamicin ointment QD. HBOT referral.  Patient considering partial calcanectomy with flap. FU 1 week.      8/16/21 - Alginate and add gentamicin ointment QD. HBOT referral.  Patient considering partial calcanectomy with flap. FU 1 week.      8/23/21 - Alginate and add gentamicin ointment QD. HBOT referral.  Patient considering partial calcanectomy with flap. FU 1 week.      8/30/21 - Alginate and add gentamicin ointment QD. HBOT referral.  Patient considering partial calcanectomy with flap. FU 1 week.      9/20/21 - Alginate and add gentamicin ointment QD. HBOT referral.  Patient considering partial calcanectomy with flap. FU 1 week.      10/25/21 Alginate and add gentamicin ointment QD. HBOT referral.  Patient considering partial calcanectomy with flap. FU 1 week.      11/1/21 Alginate and add gentamicin ointment QD. HBOT referral.  Patient considering partial calcanectomy with flap. FU 1 week.      11/8/21 - Alginate and add gentamicin ointment QD. HBOT referral.  Patient considering partial calcanectomy with flap. FU 1 week.      11/15/21 - Alginate and add gentamicin ointment QD. HBOT referral.  Patient considering partial calcanectomy with flap. FU 1 week.      11/22/21 -  Alginate and add gentamicin ointment QD. HBOT referral.  Patient considering partial calcanectomy with flap. FU 1 week.    11/29/21 0 Alginate and gentamicin ointment. Possible DC home. HBOT referral.  Patient considering partial calcanectomy with flap. FU 1 week.      12/6/21 - Alginate and gentamicin ointment. Possible DC home. HBOT referral.  Patient considering partial calcanectomy with flap. FU 1 week.      12/13/21 - Alginate and gentamicin ointment. DC home. HBOT referral.  [facility]  referral for wound care and PT for GAIT training and safe DME use. Patient considering partial calcanectomy with flap. FU 1 week.      12/20/21 -  Alginate and gentamicin ointment. DC home. HBOT referral.     12/27/21 - HBOT consultation complete - to start HBOT after new year. 2-14-22 patient presents today for evaluation of a left heel wound. Patient transferred here to [facility] due to the fact that he is doing HBO, for convenience due to transportation. He was prior following with Dr. Clif Navarro. Patient overall doing well, no complaints. Tolerating dressings as well as hyperbarics. Physical exam demonstrates vascular intact. Wound appreciated posterior aspect with areas of devitalized nonviable tissue with beefy tissue noted as well. There is no purulence, odor, erythema or increase in temperature. Currently no exposed bone. 2-24-22  Patient overall doing well, no complaints. Tolerating dressings as well as hyperbarics. Physical exam demonstrates vascular intact. Wound appreciated posterior aspect with areas of devitalized nonviable tissue with beefy tissue noted as well. There is no purulence, odor, erythema or increase in temperature. 3-3-22 presents in follow-up. Relates dark area on his left great toenail as well as \"swelling\"  Left lower leg that he noticed recently. Patient denies any known trauma. Patient currently takes aspirin as well as Plavix. Physical exam demonstrates vascular intact.   Wound appreciated posterior aspect with areas of devitalized nonviable tissue with beefy tissue noted as well. There is no purulence, odor, erythema or increase in temperature. Left great toenail proximal lateral small area, subungual hematoma, stable. 2 fluctuant areas adjacent left lower leg anterior lateral aspect. There is no erythema or increase in temperature. After oral consent under sterile technique and utilizing 1% lidocaine plain, 21-gauge needle as well as 11 blade stab incision expressing approximately 20 cc of hematoma formation, this was cultured.   Monitor closely if he notices any recurrence recommend [facility] for further evaluation       Wound/Ulcer Pain Timing/Severity: none  Quality of pain: N/A  Severity:  0 / 10   Modifying Factors: None  Associated Signs/Symptoms: none     Ulcer Identification:  Ulcer Type: arterial and diabetic  Contributing Factors: diabetes     Diabetic/Pressure/Non Pressure Ulcers onl y:        PAST MEDICAL HISTORY      Diagnosis Date    Cerebral artery occlusion with cerebral infarction ([address]     Diabetes mellitus ([address]     Type 2    Diabetic foot ulcer with osteomyelitis ([address] 12/21/2021    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 2 diabetes mellitus, with necrosis of bone ([address] 12/21/2021    Hemiparesis affecting left side as late effect of cerebrovascular accident ([address]     LEFT SIDE NON DOMINANT FOLLOWING STROKE    Hypertension     Peripheral vascular angioplasty status 12/21/2021    Ulcer of left heel, with necrosis of bone ([address] 12/27/2021    Ulcer of left heel, with necrosis of muscle ([address] 12/21/2021     Past Surgical History:   Procedure Laterality Date    FINGER AMPUTATION      FOOT DEBRIDEMENT Left 2/16/2021    LEFT FOOT DEBRIDEMENT WITH BONE BIOPSY, WOUND VAC APPLICATION performed by Robbi Soliz DPM at [address] [facility] ARTHROSCOPY      TONSILLECTOMY      TRANSESOPHAGEAL ECHOCARDIOGRAM N/A 2/22/2021    TRANSESOPHAGEAL ECHOCARDIOGRAM WITH BUBBLE STUDY performed by Monica Byers MD at [address] GASTROINTESTINAL ENDOSCOPY N/A 1/4/2021    EGD BIOPSY performed by Sandra Arriaga DO at [facility] reviewed. No pertinent family history. Social History     Tobacco Use    Smoking status: Former Smoker    Smokeless tobacco: Never Used   Vaping Use    Vaping Use: Never used   Substance Use Topics    Alcohol use: Not Currently     Comment: Former every day drinker for most of adult life    Drug use: No     Allergies   Allergen Reactions    Pcn [Penicillins]      Current Outpatient Medications on File Prior to Encounter   Medication Sig Dispense Refill    traMADol (ULTRAM) 50 MG tablet Take 50 mg by mouth 2 times daily.  diphenhydrAMINE (BENADRYL ALLERGY) 25 MG capsule Take 25 mg by mouth every 6 hours as needed for Itching      gabapentin (NEURONTIN) 100 MG capsule Take 100 mg by mouth 2 times daily.  B Complex-C-E-Zn (STRESS B/ZINC) TABS Take 1 tablet by mouth daily      acetaminophen (TYLENOL) 325 MG tablet Take 650 mg by mouth every 6 hours as needed for Pain      ferrous sulfate (IRON 325) 325 (65 Fe) MG tablet Take 325 mg by mouth daily (with breakfast)      vitamin D (ERGOCALCIFEROL) 1.25 MG (84370 UT) CAPS capsule Take 1 capsule by mouth once a week 5 capsule 0    melatonin 3 MG TABS tablet Take 9 mg by mouth nightly as needed      aspirin 81 MG EC tablet Take 81 mg by mouth daily      hydroCHLOROthiazide (HYDRODIURIL) 25 MG tablet Take 25 mg by mouth daily      metFORMIN (GLUCOPHAGE) 500 MG tablet Take 1 tablet by mouth 2 times daily (with meals) 60 tablet 3    atorvastatin (LIPITOR) 40 MG tablet Take 1 tablet by mouth nightly 30 tablet 3    clopidogrel (PLAVIX) 75 MG tablet Take 1 tablet by mouth daily 30 tablet 3     No current facility-administered medications on file prior to encounter.        REVIEW OF SYSTEMS See HPI    Objective:    BP (!) 142/72   Pulse 86   Temp 97 °F (36.1 °C) (Temporal)   Resp 18   Ht 5' 10\" (1.778 m)   Wt 207 lb (93.9 kg)   BMI 29.70 kg/m²   Wt Readings from Last 3 Encounters:   03/03/22 207 lb (93.9 kg)   02/24/22 207 lb (93.9 kg)   02/14/22 207 lb (93.9 kg)     PHYSICAL EXAM  CONSTITUTIONAL:   Awake, alert, cooperative   EYES:  lids and lashes normal   ENT: external ears and nose without lesions   NECK:  supple, symmetrical, trachea midline   SKIN:  Open wound     Assessment:     DIABETIC ULCER.     Pre Debridement Measurements:  Are located in the [place]  Documentation Flow Sheet  Post Debridement Measurements:  Wound/Ulcer Descriptions are Pre Debridement except measurements:     Wound 12/26/20 Arm Left (Active)   Number of days: 431       Wound 02/15/21 Heel Left (Active)   Number of days: 381       Wound 02/15/21 Heel Right (Active)   Number of days: 381       Wound 03/08/21 Heel Left #1 left heel aquired 12/1/20 (Active)   Wound Image   02/14/22 1344   Wound Etiology Diabetic 02/14/22 1344   Dressing Status New dressing applied 02/24/22 1158   Wound Cleansed Cleansed with saline 02/24/22 1158   Dressing/Treatment Alginate;ABD;Dry dressing;Roll gauze 02/24/22 1158   Offloading for Diabetic Foot Ulcers Post op shoe 02/24/22 1158   Wound Length (cm) 4 cm 03/03/22 1122   Wound Width (cm) 3 cm 03/03/22 1122   Wound Depth (cm) 0.9 cm 03/03/22 1122   Wound Surface Area (cm^2) 12 cm^2 03/03/22 1122   Change in Wound Size % (l*w) 0.74 03/03/22 1122   Wound Volume (cm^3) 10.8 cm^3 03/03/22 1122   Wound Healing % -28 03/03/22 1122   Post-Procedure Length (cm) 4 cm 03/03/22 1138   Post-Procedure Width (cm) 3.2 cm 03/03/22 1138   Post-Procedure Depth (cm) 1 cm 03/03/22 1138   Post-Procedure Surface Area (cm^2) 12.8 cm^2 03/03/22 1138   Post-Procedure Volume (cm^3) 12.8 cm^3 03/03/22 1138   Undermining Starts ___ O'Clock 6 03/03/22 1122   Undermining Ends___ O'Clock 11 03/03/22 1122   Undermining Maxium Distance (cm) [email or web address] 03/03/22 1122   Wound Assessment Fibrin;Pink/red 03/03/22 1122   Drainage Amount Moderate 03/03/22 1122   Drainage Description Serosanguinous 03/03/22 1122   Odor None 03/03/22 1122   Adrianne-wound Assessment Maceration 03/03/22 1122   Number of days: 360       Wound 03/08/21 Heel Right #2 rt heel aquired 12/1/20 (Active)   Wound Image   04/05/21 0929   Dressing Status New dressing applied 03/18/21 1352   Wound Cleansed Cleansed with saline 04/01/21 1536   Dressing/Treatment Alginate;Collagen;Silicone border 46/21/61 1536   Offloading for Diabetic Foot Ulcers Post op shoe 04/01/21 1536   Wound Length (cm) 0 cm 04/05/21 0929   Wound Width (cm) 0 cm 04/05/21 0929   Wound Depth (cm) 0 cm 04/05/21 0929   Wound Surface Area (cm^2) 0 cm^2 04/05/21 0929   Change in Wound Size % (l*w) 100 04/05/21 0929   Wound Volume (cm^3) 0 cm^3 04/05/21 0929   Wound Healing % 100 04/05/21 0929   Post-Procedure Length (cm) 0 cm 04/05/21 1011   Post-Procedure Width (cm) 0 cm 04/05/21 1011   Post-Procedure Depth (cm) 0 cm 04/05/21 1011   Post-Procedure Surface Area (cm^2) 0 cm^2 04/05/21 1011   Post-Procedure Volume (cm^3) 0 cm^3 04/05/21 1011   Wound Assessment Fibrin 04/01/21 1357   Drainage Amount None 04/01/21 1357   Drainage Description Yellow 03/22/21 0911   Odor None 04/01/21 1357   Adrianne-wound Assessment Maceration 04/01/21 1357   Number of days: 360     Incision 02/19/21 Groin Right (Active)   Number of days: 377       Procedure Note  Indications:  Based on my examination of this patient's wound(s)/ulcer(s) today, debridement is required to promote healing and evaluate the wound base. Performed by: Julianne Camara DPM    Consent obtained:  Yes    Time out taken:  Yes    Pain Control: Anesthetic  Anesthetic: 4% Lidocaine Liquid Topical     Debridement:Excisional Debridement    Using curette the wound(s)/ulcer(s) was/were sharply debrided down through and including the removal of subcutaneous tissue.         Devitalized Tissue Debrided:  fibrin, biofilm, slough and necrotic/eschar to stimulate bleeding to promote healing, post debridement good Bleeding  * Increase in swelling  * Need for compression bandage changes due to slippage, breakthrough drainage.     If you need medical attention outside of the business hours of the [address] [place] [address] Road please contact your PCP or go to the nearest emergency room.                                                                                                                                                             Electronically signed by Lynn Valera DPM on 3/3/2022 at 12:32 PM

## 2022-03-04 ENCOUNTER — HOSPITAL ENCOUNTER (OUTPATIENT)
Dept: HYPERBARIC MEDICINE | Age: 62
Setting detail: THERAPIES SERIES
Discharge: HOME | End: 2022-03-04
Payer: COMMERCIAL

## 2022-03-04 VITALS
RESPIRATION RATE: 16 BRPM | HEART RATE: 76 BPM | TEMPERATURE: 97.1 F | DIASTOLIC BLOOD PRESSURE: 72 MMHG | SYSTOLIC BLOOD PRESSURE: 142 MMHG

## 2022-03-04 DIAGNOSIS — E11.621 DIABETIC ULCER OF LEFT HEEL ASSOCIATED WITH TYPE 2 DIABETES MELLITUS, WITH NECROSIS OF BONE (HCC): Primary | ICD-10-CM

## 2022-03-04 DIAGNOSIS — L97.424 ULCER OF LEFT HEEL, WITH NECROSIS OF BONE (HCC): ICD-10-CM

## 2022-03-04 DIAGNOSIS — L97.424 DIABETIC ULCER OF LEFT HEEL ASSOCIATED WITH TYPE 2 DIABETES MELLITUS, WITH NECROSIS OF BONE (HCC): Primary | ICD-10-CM

## 2022-03-04 LAB
METER GLUCOSE: 106 MG/DL (ref 74–99)
METER GLUCOSE: 163 MG/DL (ref 74–99)

## 2022-03-04 PROCEDURE — 82962 GLUCOSE BLOOD TEST: CPT

## 2022-03-04 PROCEDURE — G0277 HBOT, FULL BODY CHAMBER, 30M: HCPCS

## 2022-03-04 PROCEDURE — 99183 HYPERBARIC OXYGEN THERAPY: CPT | Performed by: SURGERY

## 2022-03-04 NOTE — PROGRESS NOTES
[facility]  Hyperbaric Oxygen Therapy   Progress Note    NAME: Jovanna Collado  MEDICAL RECORD NUMBER:  [id number]  AGE: 57 y.o. GENDER: male  : 1960  EPISODE DATE:  3/4/2022   Subjective   HBO Treatment Number: 19 out of Total Treatments: 30  HBO Diagnosis:   Problem List Items Addressed This Visit     Ulcer of left heel, with necrosis of bone ([address]    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 2 diabetes mellitus, with necrosis of bone ([address] - Primary (Chronic)    Relevant Orders    Hypoglycemial protocol    POCT glucose    Care order/instruction    Care order/instruction        Safety checks performed prior to treatment. See doc flowsheets for documentation. Objective         Recent Labs     22  1318 22  1628   GLUMET 163* 106*     Pre treatment Vital Signs       Temp: 97.4 °F (36.3 °C)     Pulse: 80     Resp: 18     BP: (!) 156/78     Post treatment Vital Signs  Temp: 97.1 °F (36.2 °C)  Pulse: 76  Resp: 16  BP: (!) 142/72  Assessment      Physical Exam:  General Appearance:  alert and oriented to person, place and time, well-developed and well-nourished, in no acute distress  ENT:  tympanic membranes intact bilaterally  Pulmonary/Chest:  clear to auscultation bilaterally- no wheezes, rales or rhonchi, normal air movement, no respiratory distress  Cardiovascular:  regular rate and rhythm  Chamber #: 36  Treatment Start Time: 1436     Pressure Reached Time: 1443  YOLANDA : 2  Number of Air Breaks:  Treatment Status: No Air break     Decompression Time: 1613   Treatment End Time: 1623  Length of Treatment: 90 Minutes  Symptoms Noted During Treatment: None  Total Treatment Time (min): 107    Adverse Event: no    I was present on these premises and immediately available to furnish assistance & direction throughout the procedure.    Plan      Jovanna Collado is a 57 y.o. male  did successfully complete today's hyperbaric oxygen treatment at [facility] [address]. In my clinical judgement, ongoing HBO therapy is  necessary at this time, given a threat to patient function, limb or life from the current condition. Supervision and attendance of Hyperbaric Oxygen Therapy provided. Continue HBO treatment as outlined in the treatment plan. Hyperbaric Oxygen: Jenny Barney tolerated Treatment Number: 31 well today without complications.     Discharge Instructions were explained and given to Theresa Cyndy Halina     Electronically signed by Earma Shone, MD on 3/4/2022 at 4:55 PM

## 2022-03-05 LAB — WOUND/ABSCESS: NORMAL

## 2022-03-06 NOTE — PROGRESS NOTES
[facility]  Hyperbaric Oxygen Therapy   Progress Note    NAME: Marquita Vyas  MEDICAL RECORD NUMBER:  [id number]  AGE: 57 y.o. GENDER: male  : 1960  EPISODE DATE:  2022   Subjective   HBO Treatment Number: 8 out of Total Treatments: 30  HBO Diagnosis:   Problem List Items Addressed This Visit     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 2 diabetes mellitus, with necrosis of bone ([address] - Primary (Chronic)        Safety checks performed prior to treatment. See doc flowsheets for documentation. Objective         Recent Labs     22  1318 22  1628   GLUMET 163* 106*     Pre treatment Vital Signs       Temp: 98 °F (36.7 °C)     Pulse: 90     Resp: 16     BP: (!) 141/85     Post treatment Vital Signs  Temp: 97.9 °F (36.6 °C)  Pulse: 88  Resp: 16  BP: 138/80  Assessment      Physical Exam:  General Appearance:  alert and oriented to person, place and time, well-developed and well-nourished, in no acute distress  ENT:  tympanic membranes intact bilaterally  Pulmonary/Chest:  clear to auscultation bilaterally- no wheezes, rales or rhonchi, normal air movement, no respiratory distress  Cardiovascular:  regular rate and rhythm  Chamber #: 38  Treatment Start Time: 1434     Pressure Reached Time: 1449  YOLANDA : 2  Number of Air Breaks:  Treatment Status: No Air break     Decompression Time: 1620   Treatment End Time: 0449  Length of Treatment: 90 Minutes  Symptoms Noted During Treatment: None  Total Treatment Time (min): 117    Adverse Event: no    I was present on these premises and immediately available to furnish assistance & direction throughout the procedure. Plan      Marquita Vyas is a 57 y.o. male  did successfully complete today's hyperbaric oxygen treatment at [address] [place] [address].     In my clinical judgement, ongoing HBO therapy is  necessary at this time, given a threat to patient function, limb or life from the current condition. Supervision and attendance of Hyperbaric Oxygen Therapy provided. Continue HBO treatment as outlined in the treatment plan. Hyperbaric Oxygen: Sterling Anne Steel tolerated Treatment Number: 8 well today without complications.     Discharge Instructions were explained and given to Mr. Ramón Wright     Electronically signed by Teri Pratt MD on 2/9/2022 at 12:13 AM

## 2022-03-06 NOTE — PROGRESS NOTES
[facility]  Hyperbaric Oxygen Therapy   Progress Note    NAME: Geovani Chacon  MEDICAL RECORD NUMBER:  [id number]  AGE: 57 y.o. GENDER: male  : 1960  EPISODE DATE:  2022   Subjective   HBO Treatment Number: 7 out of Total Treatments: 30  HBO Diagnosis:   Problem List Items Addressed This Visit     None        Safety checks performed prior to treatment. See doc flowsheets for documentation. Objective         Recent Labs     22  1318 22  1628   GLUMET 163* 106*     Pre treatment Vital Signs       Temp: 97.7 °F (36.5 °C)     Pulse: 96     Resp: 18     BP: 127/67     Post treatment Vital Signs  Temp: 97.4 °F (36.3 °C)  Pulse: 84  Resp: 16  BP: 138/68  Assessment      Physical Exam:  General Appearance:  alert and oriented to person, place and time, well-developed and well-nourished, in no acute distress  ENT:  tympanic membranes intact bilaterally  Pulmonary/Chest:  clear to auscultation bilaterally- no wheezes, rales or rhonchi, normal air movement, no respiratory distress  Cardiovascular:  regular rate and rhythm  Chamber #: 38  Treatment Start Time: 1428     Pressure Reached Time: 1440  YOLANDA : 2  Number of Air Breaks:  Treatment Status: No Air break     Decompression Time: 1610   Treatment End Time: 1623  Length of Treatment: 90 Minutes  Symptoms Noted During Treatment: None  Total Treatment Time (min): 115    Adverse Event: no    I was present on these premises and immediately available to furnish assistance & direction throughout the procedure. Plan      Geovani Chacon is a 57 y.o. male  did successfully complete today's hyperbaric oxygen treatment at [address] [place] [address]. In my clinical judgement, ongoing HBO therapy is  necessary at this time, given a threat to patient function, limb or life from the current condition. Supervision and attendance of Hyperbaric Oxygen Therapy provided.   Continue HBO treatment as outlined in the treatment plan. Hyperbaric Oxygen: Abraham Steel tolerated Treatment Number: 7 well today without complications.     Discharge Instructions were explained and given to Mr. Lubna Bal     Electronically signed by Gladys Perez MD on 2/2/2022 at 11:53 PM

## 2022-03-06 NOTE — PROGRESS NOTES
[facility]  Hyperbaric Oxygen Therapy   Progress Note    NAME: David Mejia  MEDICAL RECORD NUMBER:  [id number]  AGE: 57 y.o. GENDER: male  : 1960  EPISODE DATE:  2022   Subjective   HBO Treatment Number: 11 out of Total Treatments: 30  HBO Diagnosis:   Problem List Items Addressed This Visit     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 2 diabetes mellitus, with necrosis of bone ([address] - Primary (Chronic)    Relevant Orders    POCT glucose        Safety checks performed prior to treatment. See doc flowsheets for documentation. Objective         Recent Labs     22  1318 22  1628   GLUMET 163* 106*     Pre treatment Vital Signs       Temp: 98.2 °F (36.8 °C)     Pulse: 84     Resp: 18     BP: 134/78     Post treatment Vital Signs  Temp: 98.1 °F (36.7 °C)  Pulse: 88  Resp: 18  BP: 118/70  Assessment      Physical Exam:  General Appearance:  alert and oriented to person, place and time, well-developed and well-nourished, in no acute distress  ENT:  tympanic membranes intact bilaterally  Pulmonary/Chest:  clear to auscultation bilaterally- no wheezes, rales or rhonchi, normal air movement, no respiratory distress  Cardiovascular:  regular rate and rhythm  Chamber #: 36  Treatment Start Time: 1431     Pressure Reached Time: 1442  YOLANDA : 2  Number of Air Breaks:  Treatment Status: No Air break     Decompression Time: 1612   Treatment End Time: 1621  Length of Treatment: 90 Minutes  Symptoms Noted During Treatment: None  Total Treatment Time (min): 110    Adverse Event: no    I was present on these premises and immediately available to furnish assistance & direction throughout the procedure. Plan      David Mejia is a 57 y.o. male  did successfully complete today's hyperbaric oxygen treatment at [address] [place] [address].     In my clinical judgement, ongoing HBO therapy is  necessary at this time, given a threat to patient function, limb or life from the current condition. Supervision and attendance of Hyperbaric Oxygen Therapy provided. Continue HBO treatment as outlined in the treatment plan. Hyperbaric Oxygen: Vivek Steel tolerated Treatment Number: 95 well today without complications.     Discharge Instructions were explained and given to Mr. Sharon Porter     Electronically signed by Council Cowden, MD on 2/16/2022 at 12:25 AM

## 2022-03-07 ENCOUNTER — APPOINTMENT (OUTPATIENT)
Dept: HYPERBARIC MEDICINE | Age: 62
End: 2022-03-07
Payer: COMMERCIAL

## 2022-03-08 ENCOUNTER — APPOINTMENT (OUTPATIENT)
Dept: HYPERBARIC MEDICINE | Age: 62
End: 2022-03-08
Payer: COMMERCIAL

## 2022-03-08 LAB — ANAEROBIC CULTURE: NORMAL

## 2022-03-09 ENCOUNTER — HOSPITAL ENCOUNTER (OUTPATIENT)
Dept: HYPERBARIC MEDICINE | Age: 62
Setting detail: THERAPIES SERIES
Discharge: HOME | End: 2022-03-09
Payer: COMMERCIAL

## 2022-03-09 VITALS
RESPIRATION RATE: 16 BRPM | DIASTOLIC BLOOD PRESSURE: 60 MMHG | HEART RATE: 76 BPM | TEMPERATURE: 97.2 F | SYSTOLIC BLOOD PRESSURE: 138 MMHG

## 2022-03-09 DIAGNOSIS — L97.424 DIABETIC ULCER OF LEFT HEEL ASSOCIATED WITH TYPE 2 DIABETES MELLITUS, WITH NECROSIS OF BONE (HCC): Primary | ICD-10-CM

## 2022-03-09 DIAGNOSIS — E11.621 DIABETIC ULCER OF LEFT HEEL ASSOCIATED WITH TYPE 2 DIABETES MELLITUS, WITH NECROSIS OF BONE (HCC): Primary | ICD-10-CM

## 2022-03-09 LAB
METER GLUCOSE: 146 MG/DL (ref 74–99)
METER GLUCOSE: 149 MG/DL (ref 74–99)

## 2022-03-09 PROCEDURE — 82962 GLUCOSE BLOOD TEST: CPT

## 2022-03-09 PROCEDURE — 99183 HYPERBARIC OXYGEN THERAPY: CPT | Performed by: SURGERY

## 2022-03-09 PROCEDURE — G0277 HBOT, FULL BODY CHAMBER, 30M: HCPCS

## 2022-03-10 ENCOUNTER — HOSPITAL ENCOUNTER (OUTPATIENT)
Dept: HYPERBARIC MEDICINE | Age: 62
Setting detail: THERAPIES SERIES
End: 2022-03-10
Payer: COMMERCIAL

## 2022-03-10 NOTE — PROGRESS NOTES
[facility]  Hyperbaric Oxygen Therapy   Progress Note    NAME: David Mejia  MEDICAL RECORD NUMBER:  [id number]  AGE: 57 y.o. GENDER: male  : 1960  EPISODE DATE:  3/9/2022   Subjective   HBO Treatment Number: 20 out of Total Treatments: 30  HBO Diagnosis:   Problem List Items Addressed This Visit     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 2 diabetes mellitus, with necrosis of bone ([address] - Primary (Chronic)    Relevant Orders    Hypoglycemial protocol    POCT glucose    Care order/instruction    Notify physician (specify)    Hyperbaric Oxygen Therapy    Care order/instruction        Safety checks performed prior to treatment. See doc flowsheets for documentation. Objective         Recent Labs     22  1310 22  1613   GLUMET 149* 146*     Pre treatment Vital Signs       Temp: 97.8 °F (36.6 °C)     Pulse: 80     Resp: 18     BP: (!) 150/64     Post treatment Vital Signs  Temp: 97.2 °F (36.2 °C)  Pulse: 76  Resp: 16  BP: 138/60  Assessment      Physical Exam:  General Appearance:  alert and oriented to person, place and time, well-developed and well-nourished, in no acute distress  ENT:  tympanic membranes intact bilaterally  Pulmonary/Chest:  clear to auscultation bilaterally- no wheezes, rales or rhonchi, normal air movement, no respiratory distress  Cardiovascular:  regular rate and rhythm  Chamber #: 36  Treatment Start Time: 1419     Pressure Reached Time: 1429  YOLANDA : 2  Number of Air Breaks:  Treatment Status: No Air break     Decompression Time: 1559   Treatment End Time: 1612  Length of Treatment: 90 Minutes  Symptoms Noted During Treatment: None  Total Treatment Time (min): 113    Adverse Event: no    I was present on these premises and immediately available to furnish assistance & direction throughout the procedure.    Plan      David Mejia is a 57 y.o. male  did successfully complete today's hyperbaric oxygen treatment at [address] [facility] [address] In my clinical judgement, ongoing HBO therapy is  necessary at this time, given a threat to patient function, limb or life from the current condition. Supervision and attendance of Hyperbaric Oxygen Therapy provided. Continue HBO treatment as outlined in the treatment plan. Hyperbaric Oxygen: Blake Steel tolerated Treatment Number: 20 well today without complications.     Discharge Instructions were explained and given to Mr. Wendy Shepherd     Electronically signed by Jewel Denny MD on 3/9/2022 at 10:59 PM

## 2022-03-11 ENCOUNTER — HOSPITAL ENCOUNTER (OUTPATIENT)
Dept: HYPERBARIC MEDICINE | Age: 62
Setting detail: THERAPIES SERIES
Discharge: HOME | End: 2022-03-11
Payer: COMMERCIAL

## 2022-03-11 VITALS
TEMPERATURE: 97.4 F | HEART RATE: 72 BPM | RESPIRATION RATE: 16 BRPM | DIASTOLIC BLOOD PRESSURE: 72 MMHG | SYSTOLIC BLOOD PRESSURE: 130 MMHG

## 2022-03-11 DIAGNOSIS — E11.621 DIABETIC ULCER OF LEFT HEEL ASSOCIATED WITH TYPE 2 DIABETES MELLITUS, WITH NECROSIS OF BONE (HCC): Primary | ICD-10-CM

## 2022-03-11 DIAGNOSIS — L97.424 ULCER OF LEFT HEEL, WITH NECROSIS OF BONE (HCC): ICD-10-CM

## 2022-03-11 DIAGNOSIS — L97.424 DIABETIC ULCER OF LEFT HEEL ASSOCIATED WITH TYPE 2 DIABETES MELLITUS, WITH NECROSIS OF BONE (HCC): Primary | ICD-10-CM

## 2022-03-11 LAB
METER GLUCOSE: 112 MG/DL (ref 74–99)
METER GLUCOSE: 146 MG/DL (ref 74–99)
METER GLUCOSE: 177 MG/DL (ref 74–99)

## 2022-03-11 PROCEDURE — 99183 HYPERBARIC OXYGEN THERAPY: CPT | Performed by: SURGERY

## 2022-03-11 PROCEDURE — G0277 HBOT, FULL BODY CHAMBER, 30M: HCPCS

## 2022-03-11 PROCEDURE — 82962 GLUCOSE BLOOD TEST: CPT

## 2022-03-12 NOTE — PROGRESS NOTES
[facility]  Hyperbaric Oxygen Therapy   Progress Note    NAME: Marquita Vyas  MEDICAL RECORD NUMBER:  [id number]  AGE: 57 y.o. GENDER: male  : 1960  EPISODE DATE:  3/11/2022   Subjective   HBO Treatment Number: 21 out of Total Treatments: 30  HBO Diagnosis:   Problem List Items Addressed This Visit     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 2 diabetes mellitus, with necrosis of bone ([address] - Primary (Chronic)    Relevant Orders    Hypoglycemial protocol    POCT glucose    Care order/instruction    Care order/instruction    Care order/instruction    Notify physician (specify)    Hyperbaric Oxygen Therapy    Ulcer of left heel, with necrosis of bone ([address]        Safety checks performed prior to treatment. See doc flowsheets for documentation. Objective         Recent Labs     22  1414 22  1617   GLUMET 146* 177*     Pre treatment Vital Signs       Temp: 97 °F (36.1 °C)     Pulse: 76     Resp: 16     BP: 134/76     Post treatment Vital Signs  Temp: 97.4 °F (36.3 °C)  Pulse: 72  Resp: 16  BP: 130/72  Assessment      Physical Exam:  General Appearance:  alert and oriented to person, place and time, well-developed and well-nourished, in no acute distress  ENT:  tympanic membranes intact bilaterally  Pulmonary/Chest:  clear to auscultation bilaterally- no wheezes, rales or rhonchi, normal air movement, no respiratory distress  Cardiovascular:  regular rate and rhythm  Chamber #: 36  Treatment Start Time: 1425     Pressure Reached Time: 0603  YOLANDA : 2  Number of Air Breaks:  Treatment Status: No Air break     Decompression Time: 1607   Treatment End Time: 1258  Length of Treatment: 90 Minutes  Symptoms Noted During Treatment: None  Total Treatment Time (min): 109    Adverse Event: no    I was present on these premises and immediately available to furnish assistance & direction throughout the procedure.    Plan      Marquita Vyas is a 57 y.o. male  did successfully complete today's hyperbaric oxygen treatment at [address] In my clinical judgement, ongoing HBO therapy is  necessary at this time, given a threat to patient function, limb or life from the current condition. Supervision and attendance of Hyperbaric Oxygen Therapy provided. Continue HBO treatment as outlined in the treatment plan. Hyperbaric Oxygen: Cheo Steel tolerated Treatment Number: 21 well today without complications.     Discharge Instructions were explained and given to Theresa Key Matamoros     Electronically signed by Mariya Berg MD on 3/11/2022 at 8:54 PM

## 2022-03-14 ENCOUNTER — HOSPITAL ENCOUNTER (OUTPATIENT)
Dept: HYPERBARIC MEDICINE | Age: 62
Setting detail: THERAPIES SERIES
Discharge: HOME | End: 2022-03-14
Payer: COMMERCIAL

## 2022-03-14 VITALS
HEART RATE: 76 BPM | SYSTOLIC BLOOD PRESSURE: 120 MMHG | DIASTOLIC BLOOD PRESSURE: 62 MMHG | TEMPERATURE: 97.8 F | RESPIRATION RATE: 18 BRPM

## 2022-03-14 DIAGNOSIS — E11.621 DIABETIC ULCER OF LEFT HEEL ASSOCIATED WITH TYPE 2 DIABETES MELLITUS, WITH NECROSIS OF BONE (HCC): Primary | ICD-10-CM

## 2022-03-14 DIAGNOSIS — L97.424 DIABETIC ULCER OF LEFT HEEL ASSOCIATED WITH TYPE 2 DIABETES MELLITUS, WITH NECROSIS OF BONE (HCC): Primary | ICD-10-CM

## 2022-03-14 LAB
METER GLUCOSE: 108 MG/DL (ref 74–99)
METER GLUCOSE: 163 MG/DL (ref 74–99)

## 2022-03-14 PROCEDURE — G0277 HBOT, FULL BODY CHAMBER, 30M: HCPCS

## 2022-03-14 PROCEDURE — 99183 HYPERBARIC OXYGEN THERAPY: CPT | Performed by: SURGERY

## 2022-03-14 PROCEDURE — 82962 GLUCOSE BLOOD TEST: CPT

## 2022-03-15 ENCOUNTER — HOSPITAL ENCOUNTER (OUTPATIENT)
Dept: HYPERBARIC MEDICINE | Age: 62
Setting detail: THERAPIES SERIES
Discharge: HOME | End: 2022-03-15
Payer: COMMERCIAL

## 2022-03-15 VITALS
TEMPERATURE: 97.6 F | SYSTOLIC BLOOD PRESSURE: 144 MMHG | RESPIRATION RATE: 18 BRPM | DIASTOLIC BLOOD PRESSURE: 68 MMHG | HEART RATE: 74 BPM

## 2022-03-15 DIAGNOSIS — L97.424 DIABETIC ULCER OF LEFT HEEL ASSOCIATED WITH TYPE 2 DIABETES MELLITUS, WITH NECROSIS OF BONE (HCC): Primary | ICD-10-CM

## 2022-03-15 DIAGNOSIS — E11.621 DIABETIC ULCER OF LEFT HEEL ASSOCIATED WITH TYPE 2 DIABETES MELLITUS, WITH NECROSIS OF BONE (HCC): Primary | ICD-10-CM

## 2022-03-15 LAB
METER GLUCOSE: 135 MG/DL (ref 74–99)
METER GLUCOSE: 158 MG/DL (ref 74–99)

## 2022-03-15 PROCEDURE — G0277 HBOT, FULL BODY CHAMBER, 30M: HCPCS

## 2022-03-15 PROCEDURE — 99183 HYPERBARIC OXYGEN THERAPY: CPT | Performed by: SURGERY

## 2022-03-15 PROCEDURE — 82962 GLUCOSE BLOOD TEST: CPT

## 2022-03-15 NOTE — PROGRESS NOTES
[facility]  Hyperbaric Oxygen Therapy   Progress Note    NAME: Radha Rizzo  MEDICAL RECORD NUMBER:  [id number]  AGE: 57 y.o. GENDER: male  : 1960  EPISODE DATE:  3/15/2022   Subjective   HBO Treatment Number: 23 out of Total Treatments: 30  HBO Diagnosis:   Problem List Items Addressed This Visit     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 2 diabetes mellitus, with necrosis of bone ([address] - Primary (Chronic)    Relevant Orders    Hypoglycemial protocol    POCT glucose    Care order/instruction    Notify physician (specify)    Hyperbaric Oxygen Therapy    Care order/instruction        Safety checks performed prior to treatment. See doc flowsheets for documentation. Objective         Recent Labs     03/15/22  1316 03/15/22  1559   GLUMET 158* 135*     Pre treatment Vital Signs       Temp: 97.4 °F (36.3 °C)     Pulse: 72     Resp: 16     BP: (!) 154/62     Post treatment Vital Signs  Temp: 97.6 °F (36.4 °C)  Pulse: 74  Resp: 18  BP: (!) 144/68  Assessment      Physical Exam:  General Appearance:  alert and oriented to person, place and time, well-developed and well-nourished, in no acute distress  ENT:  tympanic membranes intact bilaterally  Pulmonary/Chest:  clear to auscultation bilaterally- no wheezes, rales or rhonchi, normal air movement, no respiratory distress  Cardiovascular:  regular rate and rhythm  Chamber #: 39  Treatment Start Time: 1405     Pressure Reached Time: 1416  YOLANDA : 2  Number of Air Breaks:  Treatment Status: No Air break     Decompression Time: 1545   Treatment End Time: 7655  Length of Treatment: 90 Minutes  Symptoms Noted During Treatment: None  Total Treatment Time (min): 110    Adverse Event: no    I was present on these premises and immediately available to furnish assistance & direction throughout the procedure.    Plan      Radha Rizzo is a 57 y.o. male  did successfully complete today's hyperbaric oxygen treatment at [address] [facility] [address] [facility]. In my clinical judgement, ongoing HBO therapy is  necessary at this time, given a threat to patient function, limb or life from the current condition. Supervision and attendance of Hyperbaric Oxygen Therapy provided. Continue HBO treatment as outlined in the treatment plan. Hyperbaric Oxygen: Radha Rizzo tolerated Treatment Number: 23 well today without complications.     Discharge Instructions were explained and given to  Carla David     Electronically signed by Mariola Quarles MD on 3/15/2022 at 4:23 PM

## 2022-03-16 ENCOUNTER — HOSPITAL ENCOUNTER (OUTPATIENT)
Dept: HYPERBARIC MEDICINE | Age: 62
Setting detail: THERAPIES SERIES
Discharge: HOME | End: 2022-03-16
Payer: COMMERCIAL

## 2022-03-16 VITALS
DIASTOLIC BLOOD PRESSURE: 62 MMHG | RESPIRATION RATE: 16 BRPM | HEART RATE: 74 BPM | TEMPERATURE: 97.2 F | SYSTOLIC BLOOD PRESSURE: 130 MMHG

## 2022-03-16 DIAGNOSIS — L97.424 DIABETIC ULCER OF LEFT HEEL ASSOCIATED WITH TYPE 2 DIABETES MELLITUS, WITH NECROSIS OF BONE (HCC): Primary | ICD-10-CM

## 2022-03-16 DIAGNOSIS — E11.621 DIABETIC ULCER OF LEFT HEEL ASSOCIATED WITH TYPE 2 DIABETES MELLITUS, WITH NECROSIS OF BONE (HCC): Primary | ICD-10-CM

## 2022-03-16 LAB
METER GLUCOSE: 132 MG/DL (ref 74–99)
METER GLUCOSE: 134 MG/DL (ref 74–99)

## 2022-03-16 PROCEDURE — 99183 HYPERBARIC OXYGEN THERAPY: CPT | Performed by: PHYSICIAN ASSISTANT

## 2022-03-16 PROCEDURE — 82962 GLUCOSE BLOOD TEST: CPT

## 2022-03-16 PROCEDURE — G0277 HBOT, FULL BODY CHAMBER, 30M: HCPCS

## 2022-03-16 NOTE — PROGRESS NOTES
[facility]  Hyperbaric Oxygen Therapy   Progress Note    NAME: Esthela Wilkerson  MEDICAL RECORD NUMBER:  [id number]  AGE: 57 y.o. GENDER: male  : 1960  EPISODE DATE:  3/16/2022   Subjective   HBO Treatment Number: 24 out of Total Treatments: 30  HBO Diagnosis:   Problem List Items Addressed This Visit       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 2 diabetes mellitus, with necrosis of bone ([address] - Primary (Chronic)    Relevant Orders    POCT glucose          Safety checks performed prior to treatment. See doc flowsheets for documentation. Objective         Recent Labs     22  1316 22  1548   GLUMET 132* 134*     Pre treatment Vital Signs       Temp: 97.1 °F (36.2 °C)     Pulse: 84     Resp: 16     BP: (!) 146/64     Post treatment Vital Signs  Temp: 97.2 °F (36.2 °C)  Pulse: 74  Resp: 16  BP: 130/62  Assessment      Physical Exam:  General Appearance:  alert and oriented to person, place and time, well-developed and well-nourished, in no acute distress  ENT:  tympanic membranes intact bilaterally  Pulmonary/Chest:  clear to auscultation bilaterally- no wheezes, rales or rhonchi, normal air movement, no respiratory distress  Cardiovascular:  regular rate and rhythm  Chamber #: 39  Treatment Start Time: 1351     Pressure Reached Time: 1405  YOLANDA : 2  Number of Air Breaks:  Treatment Status: No Air break     Decompression Time: 1535   Treatment End Time: 1546  Length of Treatment: 90 Minutes  Symptoms Noted During Treatment: None  Total Treatment Time (min): 115    Adverse Event: no    I was present on these premises and immediately available to furnish assistance & direction throughout the procedure. Plan      Esthela Wilkerson is a 57 y.o. male  did successfully complete today's hyperbaric oxygen treatment at [address] [place] [address].     In my clinical judgement, ongoing HBO therapy is  necessary at this time, given a threat to patient function, limb or life from the current condition. Supervision and attendance of Hyperbaric Oxygen Therapy provided. Continue HBO treatment as outlined in the treatment plan. Hyperbaric Oxygen: Fiorella Tobar tolerated Treatment Number: 24 well today without complications.     Discharge Instructions were explained and given to Mr. Shila Waldrop     Electronically signed by Win Garcia PA-C on 3/16/2022 at 5:14 PM

## 2022-03-17 ENCOUNTER — HOSPITAL ENCOUNTER (OUTPATIENT)
Dept: WOUND CARE | Age: 62
Discharge: HOME | End: 2022-03-17

## 2022-03-17 ENCOUNTER — HOSPITAL ENCOUNTER (OUTPATIENT)
Dept: HYPERBARIC MEDICINE | Age: 62
Setting detail: THERAPIES SERIES
End: 2022-03-17
Payer: COMMERCIAL

## 2022-03-18 ENCOUNTER — HOSPITAL ENCOUNTER (OUTPATIENT)
Dept: HYPERBARIC MEDICINE | Age: 62
Setting detail: THERAPIES SERIES
Discharge: HOME | End: 2022-03-18
Payer: COMMERCIAL

## 2022-03-18 VITALS
DIASTOLIC BLOOD PRESSURE: 60 MMHG | HEART RATE: 72 BPM | TEMPERATURE: 97.4 F | SYSTOLIC BLOOD PRESSURE: 118 MMHG | RESPIRATION RATE: 18 BRPM

## 2022-03-18 DIAGNOSIS — E11.621 DIABETIC ULCER OF LEFT HEEL ASSOCIATED WITH TYPE 2 DIABETES MELLITUS, WITH NECROSIS OF BONE (HCC): Primary | ICD-10-CM

## 2022-03-18 DIAGNOSIS — L97.423 ULCER OF LEFT HEEL, WITH NECROSIS OF MUSCLE (HCC): ICD-10-CM

## 2022-03-18 DIAGNOSIS — L97.424 DIABETIC ULCER OF LEFT HEEL ASSOCIATED WITH TYPE 2 DIABETES MELLITUS, WITH NECROSIS OF BONE (HCC): Primary | ICD-10-CM

## 2022-03-18 LAB
METER GLUCOSE: 139 MG/DL (ref 74–99)
METER GLUCOSE: 139 MG/DL (ref 74–99)

## 2022-03-18 PROCEDURE — 82962 GLUCOSE BLOOD TEST: CPT

## 2022-03-18 PROCEDURE — G0277 HBOT, FULL BODY CHAMBER, 30M: HCPCS

## 2022-03-18 PROCEDURE — 99183 HYPERBARIC OXYGEN THERAPY: CPT | Performed by: SURGERY

## 2022-03-18 NOTE — PROGRESS NOTES
[facility]  Hyperbaric Oxygen Therapy   Progress Note    NAME: Fabiola Stone  MEDICAL RECORD NUMBER:  [id number]  AGE: 57 y.o. 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 See doc flowsheets for documentation. Objective         Recent Labs     22  1342 22  1554   GLUMET 139* 139*     Pre treatment Vital Signs       Temp: 97.1 °F (36.2 °C)     Pulse: 72     Resp: 18     BP: 108/62     Post treatment Vital Signs  Temp: 97.4 °F (36.3 °C)  Pulse: 72  Resp: 18  BP: 118/60  Assessment      Physical Exam:  General Appearance:  alert and oriented to person, place and time, well-developed and well-nourished, in no acute distress  ENT:  tympanic membranes intact bilaterally  Pulmonary/Chest:  clear to auscultation bilaterally- no wheezes, rales or rhonchi, normal air movement, no respiratory distress  Cardiovascular:  regular rate and rhythm  Chamber #: 39  Treatment Start Time: 1407     Pressure Reached Time: 1420  YOLANDA : 2  Number of Air Breaks:  Treatment Status: No Air break     Decompression Time: 1544   Treatment End Time: 1558  Length of Treatment: 90 Minutes  Symptoms Noted During Treatment: None  Total Treatment Time (min): 111    Adverse Event: no    I was present on these premises and immediately available to furnish assistance & direction throughout the procedure.    Plan      Fabiola Stone is a 57 y.o. male  did successfully complete today's hyperbaric oxygen treatment at [address] [place] [address]. In my clinical judgement, ongoing HBO therapy is  necessary at this time, given a threat to patient function, limb or life from the current condition. Supervision and attendance of Hyperbaric Oxygen Therapy provided. Continue HBO treatment as outlined in the treatment plan. Hyperbaric Oxygen: Solitario Gift tolerated Treatment Number: 25 well today without complications.     Discharge Instructions were explained and given to Theresa Oswaldo Sam     Electronically signed by Yury Peterson MD on 3/18/2022 at 4:54 PM

## 2022-03-21 ENCOUNTER — HOSPITAL ENCOUNTER (OUTPATIENT)
Dept: HYPERBARIC MEDICINE | Age: 62
Setting detail: THERAPIES SERIES
Discharge: HOME | End: 2022-03-21
Payer: COMMERCIAL

## 2022-03-21 VITALS
RESPIRATION RATE: 18 BRPM | DIASTOLIC BLOOD PRESSURE: 70 MMHG | TEMPERATURE: 97.5 F | SYSTOLIC BLOOD PRESSURE: 130 MMHG | HEART RATE: 76 BPM

## 2022-03-21 DIAGNOSIS — L97.424 DIABETIC ULCER OF LEFT HEEL ASSOCIATED WITH TYPE 2 DIABETES MELLITUS, WITH NECROSIS OF BONE (HCC): Primary | ICD-10-CM

## 2022-03-21 DIAGNOSIS — E11.621 DIABETIC ULCER OF LEFT HEEL ASSOCIATED WITH TYPE 2 DIABETES MELLITUS, WITH NECROSIS OF BONE (HCC): Primary | ICD-10-CM

## 2022-03-21 LAB
METER GLUCOSE: 110 MG/DL (ref 74–99)
METER GLUCOSE: 131 MG/DL (ref 74–99)

## 2022-03-21 PROCEDURE — 99183 HYPERBARIC OXYGEN THERAPY: CPT | Performed by: SURGERY

## 2022-03-21 PROCEDURE — G0277 HBOT, FULL BODY CHAMBER, 30M: HCPCS

## 2022-03-21 PROCEDURE — 82962 GLUCOSE BLOOD TEST: CPT

## 2022-03-21 NOTE — PROGRESS NOTES
Fabiola Stone is a 57 y.o. male has been receiving hyperbaric oxygen treatment for management of: Indication  Indications: Lower Extremity Diabetic Wound ___(site) (LLE)  Pat[address]. Mr. Wendy Shepherd has completed Treatment Number: 25 out of a treatment protocol of Total Treatments: 30.    Problem List:  Patient Active Problem List   Diagnosis Code    CVA (cerebral vascular accident) ([facility] [address] [facility] I63.9    Diabetes mellitus ([address] E11.9    Diabetic foot infection ([address] E11.628, L08.9    Hypertension I10    Hemiparesis affecting left side as late effect of cerebrovascular accident [facility]) L67.279    Peripheral arterial disease ([address] I73.9    Urinary tract infection due to Proteus N39.0, B96.4    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 2 diabetes mellitus, with necrosis of bone ([address] E11.621, L97.424    Ulcer of left heel, with necrosis of muscle (HCC) L97.423    Atherosclerosis of native artery of left lower extremity with ulceration of heel (HCC) I70.244    Peripheral vascular angioplasty status Z98.62    Ulcer of left heel, with necrosis of bone ([address] L97.424       Patients ID was verified. Hyperbaric oxygen therapy plan of care, patient history, outpatient fall risk assessment, nutritional assessment and daily medications were reviewed, addressed and updated as needed. Pt is tolerating hyperbaric oxygen therapy  well without complications.          Darin Delgado RN  3/21/2022  3:10 PM

## 2022-03-22 ENCOUNTER — HOSPITAL ENCOUNTER (OUTPATIENT)
Dept: HYPERBARIC MEDICINE | Age: 62
Setting detail: THERAPIES SERIES
Discharge: HOME | End: 2022-03-22
Payer: COMMERCIAL

## 2022-03-22 VITALS
HEART RATE: 72 BPM | TEMPERATURE: 97.6 F | DIASTOLIC BLOOD PRESSURE: 66 MMHG | SYSTOLIC BLOOD PRESSURE: 128 MMHG | RESPIRATION RATE: 16 BRPM

## 2022-03-22 DIAGNOSIS — E11.621 DIABETIC ULCER OF LEFT HEEL ASSOCIATED WITH TYPE 2 DIABETES MELLITUS, WITH NECROSIS OF BONE (HCC): Primary | ICD-10-CM

## 2022-03-22 DIAGNOSIS — L97.424 DIABETIC ULCER OF LEFT HEEL ASSOCIATED WITH TYPE 2 DIABETES MELLITUS, WITH NECROSIS OF BONE (HCC): Primary | ICD-10-CM

## 2022-03-22 LAB
METER GLUCOSE: 205 MG/DL (ref 74–99)
METER GLUCOSE: 98 MG/DL (ref 74–99)

## 2022-03-22 PROCEDURE — G0277 HBOT, FULL BODY CHAMBER, 30M: HCPCS

## 2022-03-22 PROCEDURE — 99183 HYPERBARIC OXYGEN THERAPY: CPT | Performed by: SURGERY

## 2022-03-22 PROCEDURE — 82962 GLUCOSE BLOOD TEST: CPT

## 2022-03-22 NOTE — PROGRESS NOTES
[facility]  Hyperbaric Oxygen Therapy   Progress Note    NAME: Jenaro Allan  MEDICAL RECORD NUMBER:  [id number]  AGE: 57 y.o. GENDER: male  : 1960  EPISODE DATE:  3/14/2022   Subjective   HBO Treatment Number: 22 out of Total Treatments: 30  HBO Diagnosis: diabetic foot ulcer with necrosis of bone    Safety checks performed prior to treatment by HBOT staff. See doc flowsheets for documentation. Objective         Recent Labs     22  1313 22  1610   GLUMET 131* 110*     Pre treatment Vital Signs       Temp: 97.9 °F (36.6 °C)     Pulse: 81     Resp: 19     BP: 132/84     Post treatment Vital Signs  Temp: 97.8 °F (36.6 °C)  Pulse: 76  Resp: 18  BP: 120/62  Assessment      Physical Exam:  General Appearance:  alert and oriented to person, place and time, well-developed and well-nourished, in no acute distress  ENT:  tympanic membranes intact bilaterally, normal color, normal light reflex bilaterally  Pulmonary/Chest:  clear to auscultation bilaterally- no wheezes, rales or rhonchi, normal air movement, no respiratory distress  Cardiovascular:  regular rate and rhythm  Chamber #: 36  Treatment Start Time: 1418     Pressure Reached Time: 1426  YOLANDA : 2  Number of Air Breaks:  Treatment Status: No Air break     Decompression Time: 1556   Treatment End Time: 1607  Length of Treatment: 90 Minutes  Symptoms Noted During Treatment: None  Total Treatment Time (min): 109    Adverse Event: no    I was present on these premises and immediately available to furnish assistance & direction throughout the procedure. Plan      Jenaro Allan is a 57 y.o. male  did successfully complete today's hyperbaric oxygen treatment at [address] [place] [address]. In my clinical judgement, ongoing HBO therapy is necessary at this time, given a threat to patient function, limb or life from the current condition.       Supervision and attendance of Hyperbaric Oxygen Therapy provided. Continue HBO treatment as outlined in the treatment plan. Hyperbaric Oxygen: Luis Steel tolerated Treatment Number: 22 well today without complications.     Discharge Instructions were explained and given to Mr. Oswaldo Vargas by HBOT staff    Electronically signed by Renetta Garcia MD

## 2022-03-23 ENCOUNTER — HOSPITAL ENCOUNTER (OUTPATIENT)
Dept: HYPERBARIC MEDICINE | Age: 62
Setting detail: THERAPIES SERIES
Discharge: HOME | End: 2022-03-23
Payer: COMMERCIAL

## 2022-03-23 DIAGNOSIS — E11.621 DIABETIC ULCER OF LEFT HEEL ASSOCIATED WITH TYPE 2 DIABETES MELLITUS, WITH NECROSIS OF BONE (HCC): Primary | ICD-10-CM

## 2022-03-23 DIAGNOSIS — L97.424 DIABETIC ULCER OF LEFT HEEL ASSOCIATED WITH TYPE 2 DIABETES MELLITUS, WITH NECROSIS OF BONE (HCC): Primary | ICD-10-CM

## 2022-03-23 LAB
METER GLUCOSE: 129 MG/DL (ref 74–99)
METER GLUCOSE: 144 MG/DL (ref 74–99)
METER GLUCOSE: 159 MG/DL (ref 74–99)

## 2022-03-23 PROCEDURE — 99183 HYPERBARIC OXYGEN THERAPY: CPT | Performed by: NURSE PRACTITIONER

## 2022-03-23 PROCEDURE — 82962 GLUCOSE BLOOD TEST: CPT

## 2022-03-23 PROCEDURE — G0277 HBOT, FULL BODY CHAMBER, 30M: HCPCS

## 2022-03-23 NOTE — PROGRESS NOTES
[facility]  Hyperbaric Oxygen Therapy   Progress Note    NAME: Radha Rizzo  MEDICAL RECORD NUMBER:  [id number]  AGE: 57 y.o. 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 See doc flowsheets for documentation. Objective         Recent Labs     22  1312 22  1631   GLUMET 205* 98     Pre treatment Vital Signs       Temp: 97.9 °F (36.6 °C)     Pulse: 88     Resp: 16     BP: (!) 158/62     Post treatment Vital Signs  Temp: 97.6 °F (36.4 °C)  Pulse: 72  Resp: 16  BP: 128/66  Assessment      Physical Exam:  General Appearance:  alert and oriented to person, place and time, well-developed and well-nourished, in no acute distress  ENT:  tympanic membranes intact bilaterally  Pulmonary/Chest:  clear to auscultation bilaterally- no wheezes, rales or rhonchi, normal air movement, no respiratory distress  Cardiovascular:  regular rate and rhythm  Chamber #: 36  Treatment Start Time: 1420     Pressure Reached Time: 1434  YOLANDA : 2  Number of Air Breaks:  Treatment Status: No Air break     Decompression Time: 1604   Treatment End Time: 0752  Length of Treatment: 90 Minutes  Symptoms Noted During Treatment: None  Total Treatment Time (min): 114    Adverse Event: no    I was present on these premises and immediately available to furnish assistance & direction throughout the procedure. Plan      Radha Rizzo is a 57 y.o. male  did successfully complete today's hyperbaric oxygen treatment at [address] [place] [address].     In my clinical judgement, ongoing HBO therapy is  necessary at this time, given a threat to patient function, limb or life from the current condition. Supervision and attendance of Hyperbaric Oxygen Therapy provided. Continue HBO treatment as outlined in the treatment plan. Hyperbaric Oxygen: Gemma Pata Hanzes tolerated Treatment Number: 26 well today without complications.     Discharge Instructions were explained and given to Mr. Stephenson Hard     Electronically signed by Tammi Marshall MD on 3/22/2022 at 6:27 AM

## 2022-03-24 ENCOUNTER — HOSPITAL ENCOUNTER (OUTPATIENT)
Dept: HYPERBARIC MEDICINE | Age: 62
Setting detail: THERAPIES SERIES
End: 2022-03-24
Payer: COMMERCIAL

## 2022-03-24 ENCOUNTER — HOSPITAL ENCOUNTER (OUTPATIENT)
Dept: WOUND CARE | Age: 62
Discharge: HOME | End: 2022-03-24

## 2022-03-24 VITALS
SYSTOLIC BLOOD PRESSURE: 156 MMHG | DIASTOLIC BLOOD PRESSURE: 70 MMHG | HEART RATE: 63 BPM | RESPIRATION RATE: 16 BRPM | TEMPERATURE: 97.1 F

## 2022-03-25 ENCOUNTER — HOSPITAL ENCOUNTER (OUTPATIENT)
Dept: HYPERBARIC MEDICINE | Age: 62
Setting detail: THERAPIES SERIES
Discharge: HOME | End: 2022-03-25
Payer: COMMERCIAL

## 2022-03-25 VITALS
DIASTOLIC BLOOD PRESSURE: 60 MMHG | SYSTOLIC BLOOD PRESSURE: 130 MMHG | TEMPERATURE: 96.8 F | RESPIRATION RATE: 20 BRPM | HEART RATE: 79 BPM

## 2022-03-25 DIAGNOSIS — E11.621 DIABETIC ULCER OF LEFT HEEL ASSOCIATED WITH TYPE 2 DIABETES MELLITUS, WITH NECROSIS OF BONE (HCC): Primary | ICD-10-CM

## 2022-03-25 DIAGNOSIS — L97.424 DIABETIC ULCER OF LEFT HEEL ASSOCIATED WITH TYPE 2 DIABETES MELLITUS, WITH NECROSIS OF BONE (HCC): Primary | ICD-10-CM

## 2022-03-25 LAB
METER GLUCOSE: 135 MG/DL (ref 74–99)
METER GLUCOSE: 144 MG/DL (ref 74–99)

## 2022-03-25 PROCEDURE — G0277 HBOT, FULL BODY CHAMBER, 30M: HCPCS

## 2022-03-25 PROCEDURE — 99183 HYPERBARIC OXYGEN THERAPY: CPT | Performed by: SURGERY

## 2022-03-25 PROCEDURE — 82962 GLUCOSE BLOOD TEST: CPT

## 2022-03-25 NOTE — PROGRESS NOTES
[facility]  Hyperbaric Oxygen Therapy   Progress Note    NAME: Nancy Lira  MEDICAL RECORD NUMBER:  [id number]  AGE: 57 y.o. GENDER: male  : 1960  EPISODE DATE:  3/25/2022   Subjective   HBO Treatment Number: 28 out of Total Treatments: 31  HBO Diagnosis:   Problem List Items Addressed This Visit     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 2 diabetes mellitus, with necrosis of bone ([address] - Primary (Chronic)    Relevant Orders    Notify physician (specify)    Hyperbaric Oxygen Therapy    Hypoglycemial protocol    POCT glucose    Care order/instruction    Care order/instruction        Safety checks performed prior to treatment. See doc flowsheets for documentation. Objective         Recent Labs     22  1354 22  1637   GLUMET 135* 144*     Pre treatment Vital Signs       Temp: 97.3 °F (36.3 °C)     Pulse: 80     Resp: 20     BP: 123/72     Post treatment Vital Signs  Temp: 96.8 °F (36 °C)  Pulse: 79  Resp: 20  BP: 130/60  Assessment      Physical Exam:  General Appearance:  alert and oriented to person, place and time, well-developed and well-nourished, in no acute distress  ENT:  tympanic membranes intact bilaterally  Pulmonary/Chest:  clear to auscultation bilaterally- no wheezes, rales or rhonchi, normal air movement, no respiratory distress  Cardiovascular:  regular rate and rhythm  Chamber #: 39  Treatment Start Time: 1429     Pressure Reached Time: 1439  YOLANDA : 2  Number of Air Breaks:  Treatment Status: No Air break     Decompression Time: 1610   Treatment End Time: 1620  Length of Treatment: 90 Minutes  Symptoms Noted During Treatment: None  Total Treatment Time (min): 111    Adverse Event: no    I was present on these premises and immediately available to furnish assistance & direction throughout the procedure.    Plan      Nancy Lira is a 57 y.o. male  did successfully complete today's hyperbaric oxygen treatment at [facility] [address] In my clinical judgement, ongoing HBO therapy is  necessary at this time, given a threat to patient function, limb or life from the current condition. Supervision and attendance of Hyperbaric Oxygen Therapy provided. Continue HBO treatment as outlined in the treatment plan. Hyperbaric Oxygen: Brook Steel tolerated Treatment Number: 28 well today without complications.     Discharge Instructions were explained and given to Mr. Casiano Darshana     Electronically signed by Mary Grace Malcolm MD on 3/25/2022 at 5:14 PM

## 2022-03-25 NOTE — PROGRESS NOTES
[facility]  Hyperbaric Oxygen Therapy   Progress Note    NAME: Jacob Urbano  MEDICAL RECORD NUMBER:  [id number]  AGE: 57 y.o. GENDER: male  : 1960  EPISODE DATE:  3/23/2022   Subjective   HBO Treatment Number: 27 out of Total Treatments: 30  HBO Diagnosis:   Problem List Items Addressed This Visit     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 2 diabetes mellitus, with necrosis of bone ([address] - Primary (Chronic)    Relevant Orders    POCT glucose        Safety checks performed prior to treatment. See doc flowsheets for documentation. Objective         Recent Labs     22  1618 22  1354   GLUMET 129* 135*     Pre treatment Vital Signs       Temp: 96.3 °F (35.7 °C)     Pulse: 92     Resp: 18     BP: (!) 143/60     Post treatment Vital Signs  Temp: 97.1 °F (36.2 °C)  Pulse: 63  Resp: 16  BP: (!) 156/70  Assessment      Physical Exam:  General Appearance:  alert and oriented to person, place and time, well-developed and well-nourished, in no acute distress  ENT:  tympanic membranes intact bilaterally  Pulmonary/Chest:  clear to auscultation bilaterally- no wheezes, rales or rhonchi, normal air movement, no respiratory distress  Cardiovascular:  normal  Chamber #: 36  Treatment Start Time: 9638     Pressure Reached Time: 1416  YOLANDA : 2  Number of Air Breaks:  Treatment Status: No Air break     Decompression Time: 1546   Treatment End Time: 1556  Length of Treatment: 90 Minutes  Symptoms Noted During Treatment: None  Total Treatment Time (min): 111    Adverse Event: no    I was present on these premises and immediately available to furnish assistance & direction throughout the procedure. Jack Urbano is a 57 y.o. male  did successfully complete today's hyperbaric oxygen treatment at [place] [address].     In my clinical judgement, ongoing HBO therapy is  necessary at this time, given a threat to patient function, limb or life from the current condition. Supervision and attendance of Hyperbaric Oxygen Therapy provided. Continue HBO treatment as outlined in the treatment plan. Hyperbaric Oxygen: Bonny Huertas tolerated Treatment Number: 27 well today without complications.     Discharge Instructions were explained and given to  Sharon German     Electronically signed by ALBERT Tellez CNP on 3/23/2022 at 2:01 PM

## 2022-03-25 NOTE — PROGRESS NOTES
[facility]  Hyperbaric Oxygen Therapy   Progress Note    NAME: Misti Valdes  MEDICAL RECORD NUMBER:  [id number]  AGE: 57 y.o. 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 See doc flowsheets for documentation. Objective         Recent Labs     22  1600 22  1618   GLUMET 144* 129*     Pre treatment Vital Signs       Temp: 97.3 °F (36.3 °C)     Pulse: 84     Resp: 18     BP: (!) 150/60     Post treatment Vital Signs  Temp: 97.5 °F (36.4 °C)  Pulse: 76  Resp: 18  BP: 130/70  Assessment      Physical Exam:  General Appearance:  alert and oriented to person, place and time, well-developed and well-nourished, in no acute distress  ENT:  tympanic membranes intact bilaterally, normal color, normal light reflex bilaterally  Pulmonary/Chest:  clear to auscultation bilaterally- no wheezes, rales or rhonchi, normal air movement, no respiratory distress  Cardiovascular:  regular rate and rhythm  Chamber #: [phone]  Treatment Start Time: 1410     Pressure Reached Time: 1422  YOLANDA : 2  Number of Air Breaks:  Treatment Status: No Air break     Decompression Time: 1553   Treatment End Time: 7781  Length of Treatment: 90 Minutes  Symptoms Noted During Treatment: None  Total Treatment Time (min): 115    Adverse Event: no    I was present on these premises and immediately available to furnish assistance & direction throughout the procedure. Plan      Misti Valdes is a 57 y.o. male  did successfully complete today's hyperbaric oxygen treatment at [address] [place] [address]. In my clinical judgement, ongoing HBO therapy is necessary at this time, given a threat to patient function, limb or life from the current condition.       Supervision and attendance of Hyperbaric Oxygen Therapy provided. Continue HBO treatment as outlined in the treatment plan. Hyperbaric Oxygen: Nancy Lira tolerated Treatment Number: 25 well today without complications.     Discharge Instructions were explained and given to  Brandi Syeda by HBOT staff    Electronically signed by Александр Sanderson MD

## 2022-03-28 ENCOUNTER — HOSPITAL ENCOUNTER (OUTPATIENT)
Dept: HYPERBARIC MEDICINE | Age: 62
Setting detail: THERAPIES SERIES
Discharge: HOME | End: 2022-03-28
Payer: COMMERCIAL

## 2022-03-28 ENCOUNTER — HOSPITAL ENCOUNTER (OUTPATIENT)
Dept: WOUND CARE | Age: 62
Discharge: HOME | End: 2022-03-28
Payer: COMMERCIAL

## 2022-03-28 VITALS
DIASTOLIC BLOOD PRESSURE: 64 MMHG | WEIGHT: 195 LBS | RESPIRATION RATE: 20 BRPM | HEART RATE: 86 BPM | SYSTOLIC BLOOD PRESSURE: 142 MMHG | HEIGHT: 70 IN | BODY MASS INDEX: 27.92 KG/M2 | TEMPERATURE: 98.1 F

## 2022-03-28 VITALS
HEART RATE: 73 BPM | SYSTOLIC BLOOD PRESSURE: 140 MMHG | RESPIRATION RATE: 18 BRPM | DIASTOLIC BLOOD PRESSURE: 74 MMHG | TEMPERATURE: 96.7 F

## 2022-03-28 DIAGNOSIS — L97.424 DIABETIC ULCER OF LEFT HEEL ASSOCIATED WITH TYPE 2 DIABETES MELLITUS, WITH NECROSIS OF BONE (HCC): Primary | ICD-10-CM

## 2022-03-28 DIAGNOSIS — L08.9 DIABETIC FOOT INFECTION (HCC): ICD-10-CM

## 2022-03-28 DIAGNOSIS — E11.621 DIABETIC ULCER OF LEFT HEEL ASSOCIATED WITH TYPE 2 DIABETES MELLITUS, WITH NECROSIS OF BONE (HCC): Primary | ICD-10-CM

## 2022-03-28 DIAGNOSIS — E11.628 DIABETIC FOOT INFECTION (HCC): ICD-10-CM

## 2022-03-28 LAB
METER GLUCOSE: 140 MG/DL (ref 74–99)
METER GLUCOSE: 152 MG/DL (ref 74–99)

## 2022-03-28 PROCEDURE — 99183 HYPERBARIC OXYGEN THERAPY: CPT | Performed by: SURGERY

## 2022-03-28 PROCEDURE — G0277 HBOT, FULL BODY CHAMBER, 30M: HCPCS

## 2022-03-28 PROCEDURE — 11042 DBRDMT SUBQ TIS 1ST 20SQCM/<: CPT

## 2022-03-28 PROCEDURE — 6370000000 HC RX 637 (ALT 250 FOR IP): Performed by: PODIATRIST

## 2022-03-28 PROCEDURE — 82962 GLUCOSE BLOOD TEST: CPT

## 2022-03-28 RX ORDER — BETAMETHASONE DIPROPIONATE 0.05 %
OINTMENT (GRAM) TOPICAL ONCE
Status: CANCELLED | OUTPATIENT
Start: 2022-03-28 | End: 2022-03-28

## 2022-03-28 RX ORDER — BACITRACIN, NEOMYCIN, POLYMYXIN B 400; 3.5; 5 [USP'U]/G; MG/G; [USP'U]/G
OINTMENT TOPICAL ONCE
Status: CANCELLED | OUTPATIENT
Start: 2022-03-28 | End: 2022-03-28

## 2022-03-28 RX ORDER — LIDOCAINE HYDROCHLORIDE 20 MG/ML
JELLY TOPICAL ONCE
Status: CANCELLED | OUTPATIENT
Start: 2022-03-28 | End: 2022-03-28

## 2022-03-28 RX ORDER — LIDOCAINE 50 MG/G
OINTMENT TOPICAL ONCE
Status: CANCELLED | OUTPATIENT
Start: 2022-03-28 | End: 2022-03-28

## 2022-03-28 RX ORDER — BACITRACIN ZINC AND POLYMYXIN B SULFATE 500; 1000 [USP'U]/G; [USP'U]/G
OINTMENT TOPICAL ONCE
Status: CANCELLED | OUTPATIENT
Start: 2022-03-28 | End: 2022-03-28

## 2022-03-28 RX ORDER — CLOBETASOL PROPIONATE 0.5 MG/G
OINTMENT TOPICAL ONCE
Status: CANCELLED | OUTPATIENT
Start: 2022-03-28 | End: 2022-03-28

## 2022-03-28 RX ORDER — LIDOCAINE 40 MG/G
CREAM TOPICAL ONCE
Status: CANCELLED | OUTPATIENT
Start: 2022-03-28 | End: 2022-03-28

## 2022-03-28 RX ORDER — LIDOCAINE HYDROCHLORIDE 40 MG/ML
SOLUTION TOPICAL ONCE
Status: CANCELLED | OUTPATIENT
Start: 2022-03-28 | End: 2022-03-28

## 2022-03-28 RX ORDER — LIDOCAINE HYDROCHLORIDE 40 MG/ML
SOLUTION TOPICAL ONCE
Status: COMPLETED | OUTPATIENT
Start: 2022-03-28 | End: 2022-03-28

## 2022-03-28 RX ORDER — GENTAMICIN SULFATE 1 MG/G
OINTMENT TOPICAL ONCE
Status: CANCELLED | OUTPATIENT
Start: 2022-03-28 | End: 2022-03-28

## 2022-03-28 RX ORDER — GINSENG 100 MG
CAPSULE ORAL ONCE
Status: CANCELLED | OUTPATIENT
Start: 2022-03-28 | End: 2022-03-28

## 2022-03-28 RX ADMIN — LIDOCAINE HYDROCHLORIDE 3 ML: 40 SOLUTION TOPICAL at 13:13

## 2022-03-28 ASSESSMENT — PAIN DESCRIPTION - ORIENTATION: ORIENTATION: LEFT

## 2022-03-28 ASSESSMENT — PAIN DESCRIPTION - DESCRIPTORS: DESCRIPTORS: ACHING;SORE

## 2022-03-28 ASSESSMENT — PAIN - FUNCTIONAL ASSESSMENT: PAIN_FUNCTIONAL_ASSESSMENT: ACTIVITIES ARE NOT PREVENTED

## 2022-03-28 ASSESSMENT — PAIN DESCRIPTION - FREQUENCY: FREQUENCY: INTERMITTENT

## 2022-03-28 ASSESSMENT — PAIN DESCRIPTION - ONSET: ONSET: ON-GOING

## 2022-03-28 ASSESSMENT — PAIN DESCRIPTION - LOCATION: LOCATION: FOOT

## 2022-03-28 ASSESSMENT — PAIN SCALES - GENERAL: PAINLEVEL_OUTOF10: 3

## 2022-03-28 ASSESSMENT — PAIN DESCRIPTION - PROGRESSION: CLINICAL_PROGRESSION: NOT CHANGED

## 2022-03-28 ASSESSMENT — PAIN DESCRIPTION - PAIN TYPE: TYPE: CHRONIC PAIN

## 2022-03-28 NOTE — PLAN OF CARE
Problem: Wound:  Goal: Will show signs of wound healing; wound closure and no evidence of infection  Description: Will show signs of wound healing; wound closure and no evidence of infection  Outcome: Ongoing     Problem: Blood Glucose:  Goal: Ability to maintain appropriate glucose levels will improve  Description: Ability to maintain appropriate glucose levels will improve  Outcome: Ongoing     Problem: Pain:  Goal: Pain level will decrease  Description: Pain level will decrease  Outcome: Ongoing  Goal: Control of acute pain  Description: Control of acute pain  Outcome: Ongoing  Goal: Control of chronic pain  Description: Control of chronic pain  Outcome: Ongoing

## 2022-03-28 NOTE — PROGRESS NOTES
partial calcanectomy. FU 1 week     6/28/21 - Alginate and add gentamicin ointment QD. HBOT referral.  Patient hold off on partial calcanectomy. FU 1 week. Patient gave me consent (verbal) to speak with his brother Ifrah Pacheco regarding his at home arrangement. HBOT is on hold until patient is at home.     7/12/21 -  Alginate and add gentamicin ointment QD. HBOT referral.  Patient considering partial calcanectomy with flap. FU 1 week.      7/19/21 - Alginate and add gentamicin ointment QD. HBOT referral.  Patient considering partial calcanectomy with flap. FU 1 week.      8/2/21 - Alginate and add gentamicin ointment QD. HBOT referral.  Patient considering partial calcanectomy with flap. FU 1 week.      8/16/21 - Alginate and add gentamicin ointment QD. HBOT referral.  Patient considering partial calcanectomy with flap. FU 1 week.      8/23/21 - Alginate and add gentamicin ointment QD. HBOT referral.  Patient considering partial calcanectomy with flap. FU 1 week.      8/30/21 - Alginate and add gentamicin ointment QD. HBOT referral.  Patient considering partial calcanectomy with flap. FU 1 week.      9/20/21 - Alginate and add gentamicin ointment QD. HBOT referral.  Patient considering partial calcanectomy with flap. FU 1 week.      10/25/21 Alginate and add gentamicin ointment QD. HBOT referral.  Patient considering partial calcanectomy with flap. FU 1 week.      11/1/21 Alginate and add gentamicin ointment QD. HBOT referral.  Patient considering partial calcanectomy with flap. FU 1 week.      11/8/21 - Alginate and add gentamicin ointment QD. HBOT referral.  Patient considering partial calcanectomy with flap. FU 1 week.      11/15/21 - Alginate and add gentamicin ointment QD. HBOT referral.  Patient considering partial calcanectomy with flap. FU 1 week.      11/22/21 -  Alginate and add gentamicin ointment QD. HBOT referral.  Patient considering partial calcanectomy with flap. FU 1 week.    11/29/21 0 Alginate and gentamicin ointment. Possible DC home. HBOT referral.  Patient considering partial calcanectomy with flap. FU 1 week.      12/6/21 - Alginate and gentamicin ointment. Possible DC home. HBOT referral.  Patient considering partial calcanectomy with flap. FU 1 week.      12/13/21 - Alginate and gentamicin ointment. DC home. HBOT referral.  [facility] referral for wound care and PT for GAIT training and safe DME use. Patient considering partial calcanectomy with flap. FU 1 week.      12/20/21 -  Alginate and gentamicin ointment. DC home. HBOT referral.     12/27/21 - HBOT consultation complete - to start HBOT after new year. 2-14-22 patient presents today for evaluation of a left heel wound. Patient transferred here to [facility] due to the fact that he is doing HBO, for convenience due to transportation. He was prior following with Dr. Gene Payton. Patient overall doing well, no complaints. Tolerating dressings as well as hyperbarics. Physical exam demonstrates vascular intact. Wound appreciated posterior aspect with areas of devitalized nonviable tissue with beefy tissue noted as well. There is no purulence, odor, erythema or increase in temperature. Currently no exposed bone. 2-24-22  Patient overall doing well, no complaints. Tolerating dressings as well as hyperbarics. Physical exam demonstrates vascular intact. Wound appreciated posterior aspect with areas of devitalized nonviable tissue with beefy tissue noted as well. There is no purulence, odor, erythema or increase in temperature. 3-3-22 presents in follow-up. Relates dark area on his left great toenail as well as \"swelling\"  Left lower leg that he noticed recently. Patient denies any known trauma. Patient currently takes aspirin as well as Plavix. Physical exam demonstrates vascular intact.   Wound appreciated posterior aspect with areas of devitalized nonviable tissue with beefy tissue noted as well. There is no purulence, odor, erythema or increase in temperature. Left great toenail proximal lateral small area, subungual hematoma, stable. 2 fluctuant areas adjacent left lower leg anterior lateral aspect. There is no erythema or increase in temperature. After oral consent under sterile technique and utilizing 1% lidocaine plain, 21-gauge needle as well as 11 blade stab incision expressing approximately 20 cc of hematoma formation, this was cultured. Monitor closely if he notices any recurrence recommend [facility] for further evaluation    3-28-22 last seen March 3rd, transportation issues. Physical exam, vascular intact. Wound appreciated posterior aspect left heel with areas of devitalized nonviable tissue, increasing/seen. There is no purulence, odor, erythema or increase in temperature. Change treatment to Santyl. Patient would benefit from continued HBO.        Wound/Ulcer Pain Timing/Severity: none  Quality of pain: N/A  Severity:  0 / 10   Modifying Factors: None  Associated Signs/Symptoms: none     Ulcer Identification:  Ulcer Type: arterial and diabetic  Contributing Factors: diabetes     Diabetic/Pressure/Non Pressure Ulcers onl y:        PAST MEDICAL HISTORY      Diagnosis Date    Cerebral artery occlusion with cerebral infarction ([address]     Diabetes mellitus ([address]     Type 2    Diabetic foot ulcer with osteomyelitis ([address] 12/21/2021    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 2 diabetes mellitus, with necrosis of bone ([address] 12/21/2021    Hemiparesis affecting left side as late effect of cerebrovascular accident ([address]     LEFT SIDE NON DOMINANT FOLLOWING STROKE    Hypertension     Peripheral vascular angioplasty status 12/21/2021    Ulcer of left heel, with necrosis of bone ([address] 12/27/2021    Ulcer of left heel, with necrosis of muscle ([address] 12/21/2021     Past Surgical History:   Procedure Laterality Date    FINGER AMPUTATION      FOOT DEBRIDEMENT Left 2/16/2021    LEFT FOOT DEBRIDEMENT WITH BONE BIOPSY, WOUND VAC APPLICATION performed by Penelope Correa DPM at [place] [id number] ARTHROSCOPY      TONSILLECTOMY      TRANSESOPHAGEAL ECHOCARDIOGRAM N/A 2/22/2021    TRANSESOPHAGEAL ECHOCARDIOGRAM WITH BUBBLE STUDY performed by Brenden Leavitt MD at [facility] N/A 1/4/2021    EGD BIOPSY performed by Funmi Patel DO at [place] [id number] reviewed. No pertinent family history. Social History     Tobacco Use    Smoking status: Former Smoker    Smokeless tobacco: Never Used   Vaping Use    Vaping Use: Never used   Substance Use Topics    Alcohol use: Not Currently     Comment: Former every day drinker for most of adult life    Drug use: No     Allergies   Allergen Reactions    Pcn [Penicillins]      Current Outpatient Medications on File Prior to Encounter   Medication Sig Dispense Refill    gentamicin (GARAMYCIN) 0.1 % cream Apply topically with each dressing change 30 g 3    traMADol (ULTRAM) 50 MG tablet Take 50 mg by mouth 2 times daily.  diphenhydrAMINE (BENADRYL ALLERGY) 25 MG capsule Take 25 mg by mouth every 6 hours as needed for Itching      gabapentin (NEURONTIN) 100 MG capsule Take 100 mg by mouth 2 times daily.        B Complex-C-E-Zn (STRESS B/ZINC) TABS Take 1 tablet by mouth daily      acetaminophen (TYLENOL) 325 MG tablet Take 650 mg by mouth every 6 hours as needed for Pain      ferrous sulfate (IRON 325) 325 (65 Fe) MG tablet Take 325 mg by mouth daily (with breakfast)      vitamin D (ERGOCALCIFEROL) 1.25 MG (65505 UT) CAPS capsule Take 1 capsule by mouth once a week 5 capsule 0    melatonin 3 MG TABS tablet Take 9 mg by mouth nightly as needed      aspirin 81 MG EC tablet Take 81 mg by mouth daily      hydroCHLOROthiazide (HYDRODIURIL) 25 MG tablet Take 25 mg by mouth daily      metFORMIN (GLUCOPHAGE) 500 MG tablet Take 1 tablet by mouth 2 times daily (with meals) 60 tablet 3  atorvastatin (LIPITOR) 40 MG tablet Take 1 tablet by mouth nightly 30 tablet 3    clopidogrel (PLAVIX) 75 MG tablet Take 1 tablet by mouth daily 30 tablet 3     No current facility-administered medications on file prior to encounter. REVIEW OF SYSTEMS See HPI    Objective:    BP (!) 142/64   Pulse 86   Temp 98.1 °F (36.7 °C) (Temporal)   Resp 20   Ht 5' 10\" (1.778 m)   Wt 195 lb (88.5 kg)   BMI 27.98 kg/m²   Wt Readings from Last 3 Encounters:   03/28/22 195 lb (88.5 kg)   03/03/22 207 lb (93.9 kg)   02/24/22 207 lb (93.9 kg)     PHYSICAL EXAM  CONSTITUTIONAL:   Awake, alert, cooperative   EYES:  lids and lashes normal   ENT: external ears and nose without lesions   NECK:  supple, symmetrical, trachea midline   SKIN:  Open wound     Assessment:     DIABETIC ULCER.     Pre Debridement Measurements:  Are located in the [place]  Documentation Flow Sheet  Post Debridement Measurements:  Wound/Ulcer Descriptions are Pre Debridement except measurements:     Wound 12/26/20 Arm Left (Active)   Number of days: 456       Wound 02/15/21 Heel Left (Active)   Number of days: 406       Wound 02/15/21 Heel Right (Active)   Number of days: 406       Wound 03/08/21 Heel Left #1 left heel aquired 12/1/20 (Active)   Wound Image   03/25/22 1358   Wound Etiology Diabetic 02/14/22 1344   Dressing Status New dressing applied 03/28/22 1325   Wound Cleansed Cleansed with saline 03/28/22 1325   Dressing/Treatment Alginate;ABD;Dry dressing;Roll gauze 03/28/22 1325   Offloading for Diabetic Foot Ulcers Post op shoe 03/28/22 1325   Wound Length (cm) 3.9 cm 03/28/22 1309   Wound Width (cm) 3.4 cm 03/28/22 1309   Wound Depth (cm) 0.5 cm 03/28/22 1309   Wound Surface Area (cm^2) 13.26 cm^2 03/28/22 1309   Change in Wound Size % (l*w) -9.68 03/28/22 1309   Wound Volume (cm^3) 6.63 cm^3 03/28/22 1309   Wound Healing % 22 03/28/22 1309   Post-Procedure Length (cm) 3.9 cm 03/28/22 1322   Post-Procedure Width (cm) 3.5 cm 03/28/22 1322   Post-Procedure Depth (cm) 0.6 cm 03/28/22 1322   Post-Procedure Surface Area (cm^2) 13.65 cm^2 03/28/22 1322   Post-Procedure Volume (cm^3) 8.19 cm^3 03/28/22 1322   Undermining Starts ___ O'Clock 6 03/03/22 1122   Undermining Ends___ O'Clock 11 03/03/22 1122   Undermining Maxium Distance (cm) [email or web address] 03/03/22 1122   Wound Assessment Fibrin;Pink/red 03/28/22 1309   Drainage Amount Moderate 03/28/22 1309   Drainage Description Serosanguinous 03/28/22 1309   Odor None 03/28/22 1309   Adrianne-wound Assessment Maceration 03/28/22 1309   Number of days: 385       Wound 03/08/21 Heel Right #2 rt heel aquired 12/1/20 (Active)   Wound Image   04/05/21 0929   Dressing Status New dressing applied 03/18/21 1352   Wound Cleansed Cleansed with saline 04/01/21 1536   Dressing/Treatment Alginate;Collagen;Silicone border 47/35/02 1536   Offloading for Diabetic Foot Ulcers Post op shoe 04/01/21 1536   Wound Length (cm) 0 cm 04/05/21 0929   Wound Width (cm) 0 cm 04/05/21 0929   Wound Depth (cm) 0 cm 04/05/21 0929   Wound Surface Area (cm^2) 0 cm^2 04/05/21 0929   Change in Wound Size % (l*w) 100 04/05/21 0929   Wound Volume (cm^3) 0 cm^3 04/05/21 0929   Wound Healing % 100 04/05/21 0929   Post-Procedure Length (cm) 0 cm 04/05/21 1011   Post-Procedure Width (cm) 0 cm 04/05/21 1011   Post-Procedure Depth (cm) 0 cm 04/05/21 1011   Post-Procedure Surface Area (cm^2) 0 cm^2 04/05/21 1011   Post-Procedure Volume (cm^3) 0 cm^3 04/05/21 1011   Wound Assessment Fibrin 04/01/21 1357   Drainage Amount None 04/01/21 1357   Drainage Description Yellow 03/22/21 0911   Odor None 04/01/21 1357   Adrianne-wound Assessment Maceration 04/01/21 1357   Number of days: 385     Incision 02/19/21 Groin Right (Active)   Number of days: 402       Procedure Note  Indications:  Based on my examination of this patient's wound(s)/ulcer(s) today, debridement is required to promote healing and evaluate the wound base.     Performed by: Benedict Mac DPM    Consent obtained:  Yes    Time out taken:  Yes    Pain Control: Anesthetic  Anesthetic: 4% Lidocaine Liquid Topical     Debridement:Excisional Debridement    Using curette the wound(s)/ulcer(s) was/were sharply debrided down through and including the removal of subcutaneous tissue. Devitalized Tissue Debrided:  fibrin, biofilm, slough and necrotic/eschar to stimulate bleeding to promote healing, post debridement good bleeding base and wound edges noted    Wound/Ulcer #: 1    Percent of Wound/Ulcer Debrided: 100%    Total Surface Area Debrided: 13.65  sq cm     Estimated Blood Loss:  Minimal  Hemostasis Achieved:  by pressure    Procedural Pain:  0  / 10   Post Procedural Pain:  0 / 10     Response to treatment:  Well tolerated by patient. Plan:   Treatment Note please see attached Discharge Instructions    Written patient dismissal instructions given to patient and signed by patient or POA.          Discharge Instructions         Discharge condition: Stable     Assessment of pain at discharge: moderate     Anesthetic used: 4% liquid lidocaine solution      Discharge to: ECF     Left via:ambulance     Accompanied by: self     [facility]      Dressing Orders:  Cleanse left heel ulcer with normal saline solution apply santyl ointment and cover with plain alginate and dry dressing and change every day or as needed for drainage.  .      Treatment Orders:    Continue antibiotics per Dr Telma Yoo and Cipro for suppression.  Wear prevalon boots or heel ayo while in bed.          May be partial weight bearing up to 50% on left with surgical shoe.     HBO continue     Consider surgical debridement of infected bone in heel with skin graft.     Start PT evaluate and treat as indicated three times a week.        [facility] followup visit: _____ dr Samra Ashby 3/31______________ 1 weeks Dr Kirk____________________________  (Please note your next appointment above and if you are unable to keep, kindly give a 24 hour notice.  Thank you.)     Physician signature:__________________________        If you experience any of the following, please call the [facility] [address] during business hours:     * Increase in Pain  * Temperature over 101  * Increase in drainage from your wound  * Drainage with a foul odor  * Bleeding  * Increase in swelling  * Need for compression bandage changes due to slippage, breakthrough drainage.     If you need medical attention outside of the business hours of the [facility] [address] please contact your PCP or go to the nearest emergency room.                                                                                                                                                                                  Electronically signed by Aubrey Kawasaki, DPM on 3/28/2022 at 2:09 PM

## 2022-03-28 NOTE — DISCHARGE INSTR - COC
Continuity of Care Form    Patient Name: Arabella Steinberg   :  1960  MRN:  [id number]    Admit date:  (Not on file)  Discharge date:  ***    Code Status Order: Prior   Advance Directives:      Admitting Physician:  No admitting provider for patient encounter. PCP: Mildred Phillips MD    Discharging Nurse: [facility] Unit/Room#: No information available for this encounter. Discharging Unit Phone Number: ***    Emergency Contact:   Extended Emergency Contact Information  Primary Emergency Contact: Rubén CRUZ Nazario Temple Phone: [phone]  Mobile Phone: [phone]  Relation: Brother/Sister   needed?  No  Secondary Emergency Contact: Jayant Matthew, [address] [place] [address] Phone: [phone]  Mobile Phone: [phone]  Relation: Other    Past Surgical History:  Past Surgical History:   Procedure Laterality Date    FINGER AMPUTATION      FOOT DEBRIDEMENT Left 2021    LEFT FOOT DEBRIDEMENT WITH BONE BIOPSY, WOUND VAC APPLICATION performed by Kisha Horne DPM at [facility] ARTHROSCOPY      TONSILLECTOMY      TRANSESOPHAGEAL ECHOCARDIOGRAM N/A 2021    TRANSESOPHAGEAL ECHOCARDIOGRAM WITH BUBBLE STUDY performed by Leola Cedillo MD at [facility] N/A 2021    EGD BIOPSY performed by Belen Franklin DO at [facility] ENDOSCOPY       Immunization History:   Immunization History   Administered Date(s) Administered    COVID-19, J&J, PF, 0.5 mL 2021       Active Problems:  Patient Active Problem List   Diagnosis Code    CVA (cerebral vascular accident) ([address] I63.9    Diabetes mellitus ([address] E11.9    Diabetic foot infection ([address] E11.628, L08.9    Hypertension I10    Hemiparesis affecting left side as late effect of cerebrovascular accident ([address] M08.832    Peripheral arterial disease ([address] I73.9    Urinary tract infection due to Proteus N39.0, B96.4    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 2 diabetes mellitus, with necrosis of bone ([address] E11.621, L97.424    Ulcer of left heel, with necrosis of muscle (HCC) L97.423    Atherosclerosis of native artery of left lower extremity with ulceration of heel (HCC) I70.244    Peripheral vascular angioplasty status Z98.62    Ulcer of left heel, with necrosis of bone ([facility] L97.424       Isolation/Infection:   Isolation            No Isolation          Patient Infection Status       Infection Onset Added Last Indicated Last Indicated By Review Planned Expiration Resolved Resolved By    MRSA 05/10/21 05/12/21 06/03/21 Culture, Wound        Foot, wound/abscess, 5/10/2021, 6/3/2021    Resolved    COVID-19 (Rule Out) 02/18/21 02/19/21 02/19/21 COVID-19 (Ordered)   02/21/21 Rule-Out Test Resulted    COVID-19 (Rule Out) 01/04/21 01/04/21 01/04/21 COVID-19 (Ordered)   01/04/21 Rule-Out Test Resulted    COVID-19 (Rule Out) 12/26/20 12/26/20 12/26/20 COVID-19 (Ordered)   12/26/20 Rule-Out Test Resulted            Nurse Assessment:  Last Vital Signs: There were no vitals taken for this visit.     Last documented pain score (0-10 scale):    Last Weight:   Wt Readings from Last 1 Encounters:   03/03/22 207 lb (93.9 kg)     Mental Status:  {IP PT MENTAL STATUS:20030}    IV Access:  { KELLY IV ACCESS:548548351}    Nursing Mobility/ADLs:  Walking   {[facility] DME WPKE:551179070}  Transfer  {P DME ANZY:155143473}  Bathing  {P DME JQGI:738097889}  Dressing  {P DME MLPE:848919980}  Toileting  {[facility] DME GQLB:963758381}  Feeding  {[facility] DME YBLN:106420264}  Med Admin  {[facility] DME IWWJ:347993271}  Med Delivery   { KELLY MED Delivery:069509068}    Wound Care Documentation and Therapy:  Wound 12/26/20 Arm Left (Active)   Number of days: 456       Wound 02/15/21 Heel Left (Active)   Number of days: 406       Wound 02/15/21 Heel Right (Active)   Number of days: 406       Wound 03/08/21 Heel Left #1 left heel aquired 12/1/20 (Active)   Wound Image   03/25/22 1358   Wound Length (cm) 3.7 cm 03/25/22 1358   Wound Width (cm) 2.8 cm 03/25/22 1358 Wound Depth (cm) 0.4 cm 03/25/22 1358   Wound Surface Area (cm^2) 10.36 cm^2 03/25/22 1358   Change in Wound Size % (l*w) 14.31 03/25/22 1358   Wound Volume (cm^3) 4.144 cm^3 03/25/22 1358   Wound Healing % 51 03/25/22 1358   Post-Procedure Length (cm) 4 cm 03/03/22 1138   Post-Procedure Width (cm) 3.2 cm 03/03/22 1138   Post-Procedure Depth (cm) 1 cm 03/03/22 1138   Post-Procedure Surface Area (cm^2) 12.8 cm^2 03/03/22 1138   Post-Procedure Volume (cm^3) 12.8 cm^3 03/03/22 1138   Undermining Starts ___ O'Clock 6 03/03/22 1122   Undermining Ends___ O'Clock 11 03/03/22 1122   Undermining Maxium Distance (cm) [email or web address] 03/03/22 1122   Wound Assessment Slough;Pink/red 03/25/22 1358   Drainage Amount Moderate 03/25/22 1358   Drainage Description Serosanguinous; Yellow 03/25/22 1358   Odor None 03/25/22 1358   Adrianne-wound Assessment Maceration;Fragile 03/25/22 1358   Number of days: 384       Wound 03/08/21 Heel Right #2 rt heel aquired 12/1/20 (Active)   Number of days: 384        Elimination:  Continence: Bowel: {YES / DN:25965}  Bladder: {YES / IJ:27402}  Urinary Catheter: {Urinary Catheter:069229321}   Colostomy/Ileostomy/Ileal Conduit: {YES / GD:29051}       Date of Last BM: ***  No intake or output data in the 24 hours ending 03/28/22 0916  No intake/output data recorded.     Safety Concerns:     [address] [employer] Safety Concerns:810234262}    Impairments/Disabilities:      [address] [employer] Impairments/Disabilities:257389171}    Nutrition Therapy:  Current Nutrition Therapy:   [address] [employer] Diet List:620083899}    Routes of Feeding: {CHP DME Other Feedings:417433256}  Liquids: {Slp liquid thickness:39121}  Daily Fluid Restriction: {CHP DME Yes amt example:590812808}  Last Modified Barium Swallow with Video (Video Swallowing Test): {Done Not Done [facility]:152939209}    Treatments at the Time of Hospital Discharge:   Respiratory Treatments: ***  Oxygen Therapy:  {Therapy; copd oxygen:10029}  Ventilator:    {MH CC Vent PMMV:784406403}    Rehab Therapies: {THERAPEUTIC INTERVENTION:2442414540}  Weight Bearing Status/Restrictions: {[facility] Weight Bearin}  Other Medical Equipment (for information only, NOT a DME order):  {EQUIPMENT:574156313}  Other Treatments: ***    Patient's personal belongings (please select all that are sent with patient):  {[facility] DME Belongings:299041276}    RN SIGNATURE:  {Esignature:201608457}    CASE MANAGEMENT/SOCIAL WORK SECTION    Inpatient Status Date: ***    Readmission Risk Assessment Score:  Readmission Risk              Risk of Unplanned Readmission:  0           Discharging to Facility/ Agency   Name:   Address:  Phone:  Fax:    Dialysis Facility (if applicable)   Name:  Address:  Dialysis Schedule:  Phone:  Fax:    / signature: {Esignature:144479232}    PHYSICIAN SECTION    Prognosis: {Prognosis:9781790639}    Condition at Discharge: [address] [place] [address] Patient Condition:665908248}    Rehab Potential (if transferring to Rehab): {Prognosis:4768504005}    Recommended Labs or Other Treatments After Discharge: ***    Physician Certification: I certify the above information and transfer of Deann He  is necessary for the continuing treatment of the diagnosis listed and that he requires {Admit to Appropriate Level of Care:89630} for {GREATER/LESS:333826691} 30 days.      Update Admission H&P: {CHP DME Changes in OJBMJ:807930437}    PHYSICIAN SIGNATURE:  {Esignature:345025230}

## 2022-03-29 ENCOUNTER — HOSPITAL ENCOUNTER (OUTPATIENT)
Dept: HYPERBARIC MEDICINE | Age: 62
Setting detail: THERAPIES SERIES
Discharge: HOME | End: 2022-03-29
Payer: COMMERCIAL

## 2022-03-29 VITALS
RESPIRATION RATE: 16 BRPM | SYSTOLIC BLOOD PRESSURE: 130 MMHG | HEART RATE: 56 BPM | DIASTOLIC BLOOD PRESSURE: 70 MMHG | TEMPERATURE: 97.3 F

## 2022-03-29 DIAGNOSIS — E11.621 DIABETIC ULCER OF LEFT HEEL ASSOCIATED WITH TYPE 2 DIABETES MELLITUS, WITH NECROSIS OF BONE (HCC): Primary | ICD-10-CM

## 2022-03-29 DIAGNOSIS — L97.424 DIABETIC ULCER OF LEFT HEEL ASSOCIATED WITH TYPE 2 DIABETES MELLITUS, WITH NECROSIS OF BONE (HCC): Primary | ICD-10-CM

## 2022-03-29 LAB
METER GLUCOSE: 166 MG/DL (ref 74–99)
METER GLUCOSE: 97 MG/DL (ref 74–99)

## 2022-03-29 PROCEDURE — 82962 GLUCOSE BLOOD TEST: CPT

## 2022-03-29 PROCEDURE — 99183 HYPERBARIC OXYGEN THERAPY: CPT | Performed by: SURGERY

## 2022-03-29 PROCEDURE — G0277 HBOT, FULL BODY CHAMBER, 30M: HCPCS

## 2022-03-30 ENCOUNTER — APPOINTMENT (OUTPATIENT)
Dept: HYPERBARIC MEDICINE | Age: 62
End: 2022-03-30
Payer: COMMERCIAL

## 2022-03-30 NOTE — PROGRESS NOTES
[facility]  Hyperbaric Oxygen Therapy   Progress Note    NAME: Chapo De Luna  MEDICAL RECORD NUMBER:  [id number]  AGE: 57 y.o. GENDER: male  : 1960  EPISODE DATE:  3/28/2022   Subjective   HBO Treatment Number: 28 out of Total Treatments: 30  HBO Diagnosis: diabetic foot ulcer with necrosis of bone    Safety checks performed prior to treatment by HBOT staff. See doc flowsheets for documentation. Objective         Recent Labs     22  1338 22  1615   GLUMET 166* 97     Pre treatment Vital Signs       Temp: 98.2 °F (36.8 °C)     Pulse: 84     Resp: 18     BP: 138/60     Post treatment Vital Signs  Temp: 96.7 °F (35.9 °C)  Pulse: 73  Resp: 18  BP: (!) 140/74  Assessment      Physical Exam:  General Appearance:  alert and oriented to person, place and time, well-developed and well-nourished, in no acute distress  ENT:  tympanic membranes intact bilaterally, normal color, normal light reflex bilaterally  Pulmonary/Chest:  clear to auscultation bilaterally- no wheezes, rales or rhonchi, normal air movement, no respiratory distress  Cardiovascular:  regular rate and rhythm  Chamber #: [id number]  Treatment Start Time: 1417     Pressure Reached Time: 1424  YOLANDA : 2  Number of Air Breaks:  Treatment Status: No Air break     Decompression Time: 1554   Treatment End Time: 9721  Length of Treatment: 90 Minutes  Symptoms Noted During Treatment: None  Total Treatment Time (min): 108    Adverse Event: no    I was present on these premises and immediately available to furnish assistance & direction throughout the procedure. Plan      Chapo De Luna is a 57 y.o. male  did successfully complete today's hyperbaric oxygen treatment at [address] [place] [address]. In my clinical judgement, ongoing HBO therapy is necessary at this time, given a threat to patient function, limb or life from the current condition.       Supervision and attendance of Hyperbaric Oxygen Therapy provided. Continue HBO treatment as outlined in the treatment plan. Hyperbaric Oxygen: Melvenia Show Hanzes tolerated Treatment Number: 28 well today without complications.     Discharge Instructions were explained and given to  Jeanne Diego by HBOT staff    Electronically signed by Eric Hayward MD

## 2022-03-30 NOTE — PROGRESS NOTES
function, limb or life from the current condition. Supervision and attendance of Hyperbaric Oxygen Therapy provided. Continue HBO treatment as outlined in the treatment plan. Hyperbaric Oxygen: Alejandro Nava tolerated Treatment Number: 64 well today without complications.     Discharge Instructions were explained and given to Mr. Vonne Baumgarten     Electronically signed by Lauren Schroeder MD on 3/29/2022 at 6:23 AM

## 2022-03-31 ENCOUNTER — APPOINTMENT (OUTPATIENT)
Dept: HYPERBARIC MEDICINE | Age: 62
End: 2022-03-31
Payer: COMMERCIAL

## 2022-03-31 ENCOUNTER — HOSPITAL ENCOUNTER (OUTPATIENT)
Dept: WOUND CARE | Age: 62
Discharge: HOME | End: 2022-03-31
Payer: COMMERCIAL

## 2022-03-31 ENCOUNTER — HOSPITAL ENCOUNTER (OUTPATIENT)
Dept: HYPERBARIC MEDICINE | Age: 62
Setting detail: THERAPIES SERIES
Discharge: HOME | End: 2022-03-31
Payer: COMMERCIAL

## 2022-03-31 VITALS
TEMPERATURE: 98.3 F | HEART RATE: 64 BPM | RESPIRATION RATE: 16 BRPM | DIASTOLIC BLOOD PRESSURE: 70 MMHG | SYSTOLIC BLOOD PRESSURE: 126 MMHG

## 2022-03-31 VITALS
DIASTOLIC BLOOD PRESSURE: 64 MMHG | HEART RATE: 62 BPM | SYSTOLIC BLOOD PRESSURE: 130 MMHG | RESPIRATION RATE: 16 BRPM | TEMPERATURE: 97.4 F

## 2022-03-31 DIAGNOSIS — E11.621 DIABETIC ULCER OF LEFT HEEL ASSOCIATED WITH TYPE 2 DIABETES MELLITUS, WITH NECROSIS OF BONE (HCC): Primary | ICD-10-CM

## 2022-03-31 DIAGNOSIS — L97.424 DIABETIC ULCER OF LEFT HEEL ASSOCIATED WITH TYPE 2 DIABETES MELLITUS, WITH NECROSIS OF BONE (HCC): Primary | ICD-10-CM

## 2022-03-31 LAB
METER GLUCOSE: 145 MG/DL (ref 74–99)
METER GLUCOSE: 148 MG/DL (ref 74–99)

## 2022-03-31 PROCEDURE — G0277 HBOT, FULL BODY CHAMBER, 30M: HCPCS

## 2022-03-31 PROCEDURE — 99183 HYPERBARIC OXYGEN THERAPY: CPT | Performed by: SURGERY

## 2022-03-31 PROCEDURE — 99213 OFFICE O/P EST LOW 20 MIN: CPT

## 2022-03-31 PROCEDURE — 82962 GLUCOSE BLOOD TEST: CPT

## 2022-03-31 NOTE — PLAN OF CARE
Problem: Wound:  Goal: Will show signs of wound healing; wound closure and no evidence of infection  Description: Will show signs of wound healing; wound closure and no evidence of infection  Outcome: Ongoing     Problem: Blood Glucose:  Goal: Ability to maintain appropriate glucose levels will improve  Description: Ability to maintain appropriate glucose levels will improve  Outcome: Ongoing     Problem: Pain:  Goal: Pain level will decrease  Description: Pain level will decrease  Outcome: Ongoing  Goal: Control of acute pain  Description: Control of acute pain  Outcome: Ongoing  Goal: Control of chronic pain  Description: Control of chronic pain  Outcome: Ongoing     Problem: Physical Regulation:  Goal: Tolerate HBO therapy and barotrauma will be prevented  Description: Tolerate HBO therapy and barotrauma will be prevented  Outcome: Ongoing

## 2022-03-31 NOTE — PROGRESS NOTES
Wound Healing Center Followup Visit Note  Referring Physician : Maggie Darling MD  [address] [facility] RECORD NUMBER:  [id number]  AGE: 57 y.o. GENDER: male  : 1960  EPISODE DATE:  3/31/2022  Subjective:     Chief Complaint   Patient presents with    Wound Check      HISTORY of PRESENT ILLNESS HPI   Hans Ascencio is a 57 y.o. male who presents today in regards to follow up evaluation and treatment of wound/ulcer. That patient's past medical, family and social hx were reviewed and changes were made if present. History of Wound Context:  Pt is known to ID s/p D/c from [facility] on  2021  Came in with 1. Left heel ulceration and necrosis of muscle. 2.  Peripheral arterial disease. 3. Acute osteomyelitis, left foot. 4. Cellulitis, left foot with abscess.     Current visit:  22   Has no c/o  No issues with atbx  Still getting hbot    3/3/2022  Has no c/o f/c  ON HBOT   Taking po omnicef/doxy no issues   Using gent to left heel has hematoma left le -Hit his fridge     2022  Pt has no c/o   On atbx  No f/c/n/v/d    2021   Pt doing well no new issues he is at home    10/28/2021  Currently wound bed looks healthy    wound cx vse/corynebacterium  On cipro/doxy    7/15/2021  NAD NO ISSUES WITH ATBX CIPRO/DOXY   LEFT HEEL WOUND SAME NO SIGNS OF INFECTION     2021  Has no c/o  On doxy  Was supposed to be on cipro also   Using gent ointment   vacc off    6/3/2021  A wound culture SHOWED MRSA  MRI SHOWED ACUTE OM/MYOSITIS/CELLULITS  FINISHED  LINEZOLID 600MG PO Q12  ON DOXY   RECX   NO F/C/N/V/D/  50% WEIGHT BEARING    2021  IN WC NAD NO C/O  NO PAIN LE   MRI NOTED   ON DOXY     2021  Still at ecf on doxycycline he has no isues with it  Left heel has some pain  He is asking about more weight bearing     Sp vancomycin (VANCOCIN) 1,000 mg  Q12H can stop today/2021  Here for f/u left post heel ulcer   Has wound vacc   Currently off has bone palpable  periwound inatct   has left >right ble swelling   No calf pain   Has no c/o  Rue picc without swelling or pain  Tolerating doxycyline informed side effects of photosensitivity    2/19 Bilateral iliac angiogram, left femoral  angiography, nitroglycerin infusion left common femoral artery 1000 mcg  x2 boluses, right common femoral artery to left popliteal artery  catheterization, PTA of the left SFA and popliteal artery with 4 x 80  RapidCross balloon, 5 x 300 Saber balloon, 5 x 220 Powerflex balloon, 6  x 100 Powerflex balloon and 6 x 250 IN. PACT Admiral balloon, completion  left femoral angiography, ProGlide closure right common femoral artery  access site. 2/16 1. Excision and debridement of the left heel ulceration to and through  level of deep fascia and muscle. Total measurement is 4.5 cm x 6.0 cm x  0.5 cm in dimension. 2.  Incision and drainage, left foot abscess. 3.  Bone biopsy, left foot. 4.  Application of wound VAC negative pressure therapy.   cx Enterococcus  Path Bone biopsy left heel: Medullary bone, negative for osteomyelitis.     Most Recent MICRO    3/3 wound cx ngtd  Organism   Date Value Ref Range Status   11/29/2021 Enterobacter cloacae complex (A)  Final   11/29/2021 Staphylococcus haemolyticus (A)  Final   11/29/2021 Corynebacterium species (A)  Final     PAST MEDICAL HISTORY      Diagnosis Date    Cerebral artery occlusion with cerebral infarction ([address]     Diabetes mellitus ([address]     Type 2    Diabetic foot ulcer with osteomyelitis ([address] 12/21/2021    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 2 diabetes mellitus, with necrosis of bone ([address] 12/21/2021    Hemiparesis affecting left side as late effect of cerebrovascular accident ([address]     LEFT SIDE NON DOMINANT FOLLOWING STROKE    Hypertension     Peripheral vascular angioplasty status 12/21/2021    Ulcer of left heel, with necrosis of bone ([address] 12/27/2021    Ulcer of left heel, with necrosis of muscle ([address] 12/21/2021     Past Surgical History:   Procedure Laterality Date    FINGER AMPUTATION      FOOT DEBRIDEMENT Left 2/16/2021    LEFT FOOT DEBRIDEMENT WITH BONE BIOPSY, WOUND VAC APPLICATION performed by Shaina Fritz DPM at [place] [address] ARTHROSCOPY      TONSILLECTOMY      TRANSESOPHAGEAL ECHOCARDIOGRAM N/A 2/22/2021    TRANSESOPHAGEAL ECHOCARDIOGRAM WITH BUBBLE STUDY performed by Jose Piper MD at [facility] N/A 1/4/2021    EGD BIOPSY performed by Edelmira Wood DO at [place] [address] reviewed. No pertinent family history. Social History     Tobacco Use    Smoking status: Former Smoker    Smokeless tobacco: Never Used   Vaping Use    Vaping Use: Never used   Substance Use Topics    Alcohol use: Not Currently     Comment: Former every day drinker for most of adult life    Drug use: No     Allergies   Allergen Reactions    Pcn [Penicillins]      Current Outpatient Medications on File Prior to Encounter   Medication Sig Dispense Refill    collagenase (SANTYL) 250 UNIT/GM ointment Apply topically daily left heel wound as instructed 30 g 2    gentamicin (GARAMYCIN) 0.1 % cream Apply topically with each dressing change 30 g 3    traMADol (ULTRAM) 50 MG tablet Take 50 mg by mouth 2 times daily.  diphenhydrAMINE (BENADRYL ALLERGY) 25 MG capsule Take 25 mg by mouth every 6 hours as needed for Itching      gabapentin (NEURONTIN) 100 MG capsule Take 100 mg by mouth 2 times daily.        B Complex-C-E-Zn (STRESS B/ZINC) TABS Take 1 tablet by mouth daily      acetaminophen (TYLENOL) 325 MG tablet Take 650 mg by mouth every 6 hours as needed for Pain      ferrous sulfate (IRON 325) 325 (65 Fe) MG tablet Take 325 mg by mouth daily (with breakfast)      vitamin D (ERGOCALCIFEROL) 1.25 MG (86520 UT) CAPS capsule Take 1 capsule by mouth once a week 5 capsule 0    melatonin 3 MG TABS tablet Take 9 mg by mouth nightly as needed      aspirin 81 MG EC tablet Take 81 mg by mouth daily      hydroCHLOROthiazide (HYDRODIURIL) 25 MG tablet Take 25 mg by mouth daily      metFORMIN (GLUCOPHAGE) 500 MG tablet Take 1 tablet by mouth 2 times daily (with meals) 60 tablet 3    atorvastatin (LIPITOR) 40 MG tablet Take 1 tablet by mouth nightly 30 tablet 3    clopidogrel (PLAVIX) 75 MG tablet Take 1 tablet by mouth daily 30 tablet 3     No current facility-administered medications on file prior to encounter.      REVIEW OF SYSTEMS See HPI  Objective:    /70   Pulse 64   Temp 98.3 °F (36.8 °C) (Temporal)   Resp 16   Wt Readings from Last 3 Encounters:   03/28/22 195 lb (88.5 kg)   03/03/22 207 lb (93.9 kg)   02/24/22 207 lb (93.9 kg)     PHYSICAL EXAM  CONSTITUTIONAL:  nad  HEENT: AT/NC glasses  HEART: S1/S2  RS: CTAB  Post   ABD: SOFT NT/ND    EXT:  Trace EDEMA   SKIN: Open wound Present post heel wound bed  Moist    Psych pleasant      Wound Care Documentation:  Wound 12/26/20 Arm Left (Active)   Number of days: 459       Wound 02/15/21 Heel Left (Active)   Number of days: 409       Wound 02/15/21 Heel Right (Active)   Number of days: 409       Wound 03/08/21 Heel Left #1 left heel aquired 12/1/20 (Active)   Wound Image   03/25/22 1358   Wound Etiology Diabetic 02/14/22 1344   Dressing Status New dressing applied 03/28/22 1325   Wound Cleansed Cleansed with saline 03/28/22 1325   Dressing/Treatment Alginate;ABD;Dry dressing;Roll gauze 03/28/22 1325   Offloading for Diabetic Foot Ulcers Post op shoe 03/28/22 1325   Wound Length (cm) 3.9 cm 03/28/22 1309   Wound Width (cm) 3.4 cm 03/28/22 1309   Wound Depth (cm) 0.5 cm 03/28/22 1309   Wound Surface Area (cm^2) 13.26 cm^2 03/28/22 1309   Change in Wound Size % (l*w) -9.68 03/28/22 1309   Wound Volume (cm^3) 6.63 cm^3 03/28/22 1309   Wound Healing % 22 03/28/22 1309   Post-Procedure Length (cm) 3.9 cm 03/31/22 1307   Post-Procedure Width (cm) 3.5 cm 03/31/22 1307   Post-Procedure Depth (cm) 0.6 cm 03/31/22 1307 D deficiency on suppleemnts    2/14 wound cx Mixed Gram positive organisms   Proteus species   TTE Summary No vegetations seen otherwise. Plan:   Cont  Doxycycline/omnicef suppression can finish rx/ATBX  Cont  hbot    WOUND CARE   allergic to pcn         No orders of the defined types were placed in this encounter. No orders of the defined types were placed in this encounter. F/u 4 weeks    Plan:   Treatment Note please see attached Discharge Instructions    Written patient dismissal instructions given to patient and signed by patient or POA. Discharge Instructions       Discharge condition: Stable     Assessment of pain at discharge: moderate     Anesthetic used: 4% liquid lidocaine solution      Discharge to: ECF     Left via:ambulance     Accompanied by: self     [facility]      Dressing Orders:  Cleanse left heel ulcer with normal saline solution apply santyl ointment and cover with plain alginate and dry dressing and change every day or as needed for drainage.  .      Treatment Orders:    Continue antibiotics per Dr Kenneth Zamrbano and Cipro for suppression.  Wear prevalon boots or heel ayo while in bed.          May be partial weight bearing up to 50% on left with surgical shoe.     HBO continue     Consider surgical debridement of infected bone in heel with skin graft.     Start PT evaluate and treat as indicated three times a week.        [facility] followup visit: _____ dr Donny Moreau in 1 month_____________ 1 weeks Dr Harrell/Marleen____________________________  (Please note your next appointment above and if you are unable to keep, kindly give a 24 hour notice.  Thank you.)     Physician signature:__________________________        If you experience any of the following, please call the [address] [place] [address] Road during business hours:     * Increase in Pain  * Temperature over 101  * Increase in drainage from your wound  * Drainage with a foul odor  * Bleeding  * Increase in swelling  * Need for compression bandage changes due to slippage, breakthrough drainage.     If you need medical attention outside of the business hours of the [address] [place] [address] Road please contact your PCP or go to the nearest emergency room.                                                                                                                                                                                                     Electronically signed by Belinda Russell MD on 3/31/2022 at 2:20 PM

## 2022-03-31 NOTE — PLAN OF CARE
Problem: Wound:  Goal: Will show signs of wound healing; wound closure and no evidence of infection  Description: Will show signs of wound healing; wound closure and no evidence of infection  3/31/2022 1256 by Malgorzata Machado RN  Outcome: Ongoing  3/31/2022 1255 by Malgorzata Machado RN  Outcome: Ongoing     Problem: Blood Glucose:  Goal: Ability to maintain appropriate glucose levels will improve  Description: Ability to maintain appropriate glucose levels will improve  3/31/2022 1256 by Malgorzata Machado RN  Outcome: Ongoing  3/31/2022 1255 by Malgorzata Machado RN  Outcome: Ongoing     Problem: Pain:  Goal: Pain level will decrease  Description: Pain level will decrease  3/31/2022 1256 by Malgorzata Machado RN  Outcome: Ongoing  3/31/2022 1255 by Malgorzata Machado RN  Outcome: Ongoing  Goal: Control of acute pain  Description: Control of acute pain  3/31/2022 1256 by Malgorzata Machado RN  Outcome: Ongoing  3/31/2022 1255 by Malgorzata Machado RN  Outcome: Ongoing  Goal: Control of chronic pain  Description: Control of chronic pain  3/31/2022 1256 by Malgorzata Machado RN  Outcome: Ongoing  3/31/2022 1255 by Malgorzata Machado RN  Outcome: Ongoing     Problem: Physical Regulation:  Goal: Tolerate HBO therapy and barotrauma will be prevented  Description: Tolerate HBO therapy and barotrauma will be prevented  3/31/2022 1256 by Malgorzata Machado RN  Outcome: Ongoing  3/31/2022 1255 by Malgorzata Machado RN  Outcome: Ongoing

## 2022-04-01 ENCOUNTER — HOSPITAL ENCOUNTER (OUTPATIENT)
Dept: HYPERBARIC MEDICINE | Age: 62
Setting detail: THERAPIES SERIES
Discharge: HOME | End: 2022-04-01
Payer: COMMERCIAL

## 2022-04-01 VITALS
SYSTOLIC BLOOD PRESSURE: 150 MMHG | HEART RATE: 67 BPM | TEMPERATURE: 96.7 F | DIASTOLIC BLOOD PRESSURE: 67 MMHG | RESPIRATION RATE: 14 BRPM

## 2022-04-01 DIAGNOSIS — E11.621 DIABETIC ULCER OF LEFT HEEL ASSOCIATED WITH TYPE 2 DIABETES MELLITUS, WITH NECROSIS OF BONE (HCC): Primary | ICD-10-CM

## 2022-04-01 DIAGNOSIS — L97.424 DIABETIC ULCER OF LEFT HEEL ASSOCIATED WITH TYPE 2 DIABETES MELLITUS, WITH NECROSIS OF BONE (HCC): Primary | ICD-10-CM

## 2022-04-01 LAB
METER GLUCOSE: 136 MG/DL (ref 74–99)
METER GLUCOSE: 225 MG/DL (ref 74–99)

## 2022-04-01 PROCEDURE — 82962 GLUCOSE BLOOD TEST: CPT

## 2022-04-01 PROCEDURE — 99183 HYPERBARIC OXYGEN THERAPY: CPT | Performed by: SURGERY

## 2022-04-01 PROCEDURE — G0277 HBOT, FULL BODY CHAMBER, 30M: HCPCS

## 2022-04-01 NOTE — PROGRESS NOTES
[facility]  Hyperbaric Oxygen Therapy   Progress Note    NAME: Jena Young  MEDICAL RECORD NUMBER:  [id number]  AGE: 57 y.o. GENDER: male  : 1960  EPISODE DATE:  3/31/2022   Subjective   HBO Treatment Number: 30 out of Total Treatments: 30  HBO Diagnosis:   Problem List Items Addressed This Visit     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 2 diabetes mellitus, with necrosis of bone ([address] - Primary (Chronic)    Relevant Orders    Hypoglycemial protocol    POCT glucose    Care order/instruction    Notify physician (specify)    Hyperbaric Oxygen Therapy    Care order/instruction        Safety checks performed prior to treatment. See doc flowsheets for documentation. Objective         Recent Labs     22  1407 22  1615   GLUMET 145* 148*     Pre treatment Vital Signs       Temp: 97.2 °F (36.2 °C)     Pulse: 64     Resp: 16     BP: 124/60     Post treatment Vital Signs  Temp: 97.4 °F (36.3 °C)  Pulse: 62  Resp: 16  BP: 130/64  Assessment      Physical Exam:  General Appearance:  alert and oriented to person, place and time, well-developed and well-nourished, in no acute distress  ENT:  tympanic membranes intact bilaterally  Pulmonary/Chest:  clear to auscultation bilaterally- no wheezes, rales or rhonchi, normal air movement, no respiratory distress  Cardiovascular:  regular rate and rhythm  Chamber #: 36  Treatment Start Time: 1424     Pressure Reached Time: 1436  YOLANDA : 2  Number of Air Breaks:  Treatment Status: No Air break     Decompression Time: 1605   Treatment End Time: 1612  Length of Treatment: 90 Minutes  Symptoms Noted During Treatment: None  Total Treatment Time (min): 108    Adverse Event: no    I was present on these premises and immediately available to furnish assistance & direction throughout the procedure.    Plan      Jena Young is a 57 y.o. male  did successfully complete today's hyperbaric oxygen treatment at [address]. In my clinical judgement, ongoing HBO therapy is  necessary at this time, given a threat to patient function, limb or life from the current condition. Supervision and attendance of Hyperbaric Oxygen Therapy provided. Continue HBO treatment as outlined in the treatment plan. Hyperbaric Oxygen: [employer] tolerated Treatment Number: 30 well today without complications.     Discharge Instructions were explained and given to Mr. Mckeon David     Electronically signed by Roger Taylor MD on 3/31/2022 at 8:38 PM

## 2022-04-01 NOTE — PROGRESS NOTES
[facility]  Hyperbaric Oxygen Therapy   Progress Note    NAME: Melody Little  MEDICAL RECORD NUMBER:  [id number]  AGE: 57 y.o. GENDER: male  : 1960  EPISODE DATE:  2022   Subjective   HBO Treatment Number: 31 out of Total Treatments: 30  HBO Diagnosis:   Problem List Items Addressed This Visit     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 2 diabetes mellitus, with necrosis of bone ([address] - Primary (Chronic)    Relevant Orders    Hypoglycemial protocol    POCT glucose    Care order/instruction    Care order/instruction    Care order/instruction    Notify physician (specify)    Hyperbaric Oxygen Therapy        Safety checks performed prior to treatment. See doc flowsheets for documentation. Objective         Recent Labs     22  1325 22  1606   GLUMET 225* 136*     Pre treatment Vital Signs       Temp: 97.2 °F (36.2 °C)     Pulse: 80     Resp: 16     BP: (!) 136/58     Post treatment Vital Signs  Temp: 96.7 °F (35.9 °C)  Pulse: 67  Resp: 14  BP: (!) 150/67  Assessment      Physical Exam:  General Appearance:  alert and oriented to person, place and time, well-developed and well-nourished, in no acute distress  ENT:  tympanic membranes intact bilaterally  Pulmonary/Chest:  clear to auscultation bilaterally- no wheezes, rales or rhonchi, normal air movement, no respiratory distress  Cardiovascular:  regular rate and rhythm  Chamber #: 36  Treatment Start Time: 3030     Pressure Reached Time: 1424  YOLANDA : 2  Number of Air Breaks:  Treatment Status: No Air break     Decompression Time: 1555   Treatment End Time: 1602  Length of Treatment: 90 Minutes  Symptoms Noted During Treatment: None  Total Treatment Time (min): 107    Adverse Event: no    I was present on these premises and immediately available to furnish assistance & direction throughout the procedure.    Plan      Melody Little is a 57 y.o. male  did successfully complete today's hyperbaric oxygen treatment at [address] [place] [address]. In my clinical judgement, ongoing HBO therapy is  necessary at this time, given a threat to patient function, limb or life from the current condition. Supervision and attendance of Hyperbaric Oxygen Therapy provided. Continue HBO treatment as outlined in the treatment plan. Hyperbaric Oxygen: Gissell Bitter Hanzes tolerated Treatment Number: 08 well today without complications.     Discharge Instructions were explained and given to Mr. Shaan Hi     Electronically signed by Norah Patel MD on 4/1/2022 at 4:29 PM

## 2022-04-04 ENCOUNTER — HOSPITAL ENCOUNTER (OUTPATIENT)
Dept: HYPERBARIC MEDICINE | Age: 62
Setting detail: THERAPIES SERIES
Discharge: HOME | End: 2022-04-04
Payer: COMMERCIAL

## 2022-04-04 ENCOUNTER — HOSPITAL ENCOUNTER (OUTPATIENT)
Dept: WOUND CARE | Age: 62
Discharge: HOME | End: 2022-04-04
Payer: COMMERCIAL

## 2022-04-04 VITALS
WEIGHT: 195 LBS | HEART RATE: 83 BPM | RESPIRATION RATE: 16 BRPM | DIASTOLIC BLOOD PRESSURE: 68 MMHG | HEIGHT: 70 IN | TEMPERATURE: 97.8 F | BODY MASS INDEX: 27.92 KG/M2 | SYSTOLIC BLOOD PRESSURE: 142 MMHG

## 2022-04-04 VITALS
SYSTOLIC BLOOD PRESSURE: 150 MMHG | DIASTOLIC BLOOD PRESSURE: 68 MMHG | RESPIRATION RATE: 16 BRPM | HEART RATE: 80 BPM | TEMPERATURE: 96.4 F

## 2022-04-04 DIAGNOSIS — L97.424 DIABETIC ULCER OF LEFT HEEL ASSOCIATED WITH TYPE 2 DIABETES MELLITUS, WITH NECROSIS OF BONE (HCC): Primary | ICD-10-CM

## 2022-04-04 DIAGNOSIS — E11.628 DIABETIC FOOT INFECTION (HCC): ICD-10-CM

## 2022-04-04 DIAGNOSIS — E11.621 DIABETIC ULCER OF LEFT HEEL ASSOCIATED WITH TYPE 2 DIABETES MELLITUS, WITH NECROSIS OF BONE (HCC): Primary | ICD-10-CM

## 2022-04-04 DIAGNOSIS — L08.9 DIABETIC FOOT INFECTION (HCC): ICD-10-CM

## 2022-04-04 LAB
METER GLUCOSE: 114 MG/DL (ref 74–99)
METER GLUCOSE: 164 MG/DL (ref 74–99)

## 2022-04-04 PROCEDURE — 82962 GLUCOSE BLOOD TEST: CPT

## 2022-04-04 PROCEDURE — 11042 DBRDMT SUBQ TIS 1ST 20SQCM/<: CPT

## 2022-04-04 PROCEDURE — 99183 HYPERBARIC OXYGEN THERAPY: CPT | Performed by: SURGERY

## 2022-04-04 PROCEDURE — 6370000000 HC RX 637 (ALT 250 FOR IP): Performed by: PODIATRIST

## 2022-04-04 PROCEDURE — G0277 HBOT, FULL BODY CHAMBER, 30M: HCPCS

## 2022-04-04 RX ORDER — LIDOCAINE 40 MG/G
CREAM TOPICAL ONCE
Status: CANCELLED | OUTPATIENT
Start: 2022-04-04 | End: 2022-04-04

## 2022-04-04 RX ORDER — GINSENG 100 MG
CAPSULE ORAL ONCE
Status: CANCELLED | OUTPATIENT
Start: 2022-04-04 | End: 2022-04-04

## 2022-04-04 RX ORDER — GENTAMICIN SULFATE 1 MG/G
OINTMENT TOPICAL ONCE
Status: CANCELLED | OUTPATIENT
Start: 2022-04-04 | End: 2022-04-04

## 2022-04-04 RX ORDER — LIDOCAINE HYDROCHLORIDE 40 MG/ML
SOLUTION TOPICAL ONCE
Status: COMPLETED | OUTPATIENT
Start: 2022-04-04 | End: 2022-04-04

## 2022-04-04 RX ORDER — LIDOCAINE 50 MG/G
OINTMENT TOPICAL ONCE
Status: CANCELLED | OUTPATIENT
Start: 2022-04-04 | End: 2022-04-04

## 2022-04-04 RX ORDER — LIDOCAINE HYDROCHLORIDE 20 MG/ML
JELLY TOPICAL ONCE
Status: CANCELLED | OUTPATIENT
Start: 2022-04-04 | End: 2022-04-04

## 2022-04-04 RX ORDER — BETAMETHASONE DIPROPIONATE 0.05 %
OINTMENT (GRAM) TOPICAL ONCE
Status: CANCELLED | OUTPATIENT
Start: 2022-04-04 | End: 2022-04-04

## 2022-04-04 RX ORDER — LIDOCAINE HYDROCHLORIDE 40 MG/ML
SOLUTION TOPICAL ONCE
Status: CANCELLED | OUTPATIENT
Start: 2022-04-04 | End: 2022-04-04

## 2022-04-04 RX ORDER — BACITRACIN ZINC AND POLYMYXIN B SULFATE 500; 1000 [USP'U]/G; [USP'U]/G
OINTMENT TOPICAL ONCE
Status: CANCELLED | OUTPATIENT
Start: 2022-04-04 | End: 2022-04-04

## 2022-04-04 RX ORDER — CLOBETASOL PROPIONATE 0.5 MG/G
OINTMENT TOPICAL ONCE
Status: CANCELLED | OUTPATIENT
Start: 2022-04-04 | End: 2022-04-04

## 2022-04-04 RX ORDER — BACITRACIN, NEOMYCIN, POLYMYXIN B 400; 3.5; 5 [USP'U]/G; MG/G; [USP'U]/G
OINTMENT TOPICAL ONCE
Status: CANCELLED | OUTPATIENT
Start: 2022-04-04 | End: 2022-04-04

## 2022-04-04 RX ADMIN — LIDOCAINE HYDROCHLORIDE 3 ML: 40 SOLUTION TOPICAL at 13:17

## 2022-04-04 ASSESSMENT — PAIN - FUNCTIONAL ASSESSMENT: PAIN_FUNCTIONAL_ASSESSMENT: ACTIVITIES ARE NOT PREVENTED

## 2022-04-04 ASSESSMENT — PAIN DESCRIPTION - DESCRIPTORS: DESCRIPTORS: ACHING;SORE

## 2022-04-04 ASSESSMENT — PAIN SCALES - GENERAL: PAINLEVEL_OUTOF10: 0

## 2022-04-04 ASSESSMENT — PAIN DESCRIPTION - PAIN TYPE: TYPE: CHRONIC PAIN

## 2022-04-04 ASSESSMENT — PAIN DESCRIPTION - ONSET: ONSET: ON-GOING

## 2022-04-04 ASSESSMENT — PAIN DESCRIPTION - ORIENTATION: ORIENTATION: LEFT

## 2022-04-04 ASSESSMENT — PAIN DESCRIPTION - LOCATION: LOCATION: FOOT

## 2022-04-04 ASSESSMENT — PAIN DESCRIPTION - PROGRESSION: CLINICAL_PROGRESSION: NOT CHANGED

## 2022-04-04 ASSESSMENT — PAIN DESCRIPTION - FREQUENCY: FREQUENCY: INTERMITTENT

## 2022-04-04 NOTE — PROGRESS NOTES
Wound Healing Center Followup Visit Note    Referring Physician : Nikhil Levi MD  [id number] [facility] RECORD NUMBER:  [id number]  AGE: 57 y.o. GENDER: male  : 1960  EPISODE DATE:  2022    Subjective:     Chief Complaint   Patient presents with    Wound Check     left heel      HISTORY of PRESENT ILLNESS HPI   Patricia Cabral is a 57 y.o. male who presents today in regards to follow up evaluation and treatment of wound/ulcer. That patient's past medical, family and social hx were reviewed and changes were made if present. History of Wound Context:  The patient has had a wound of bilateral heels which was first noted approximately over a month ago. This has been treated with surgical debridement. On their initial visit to the wound healing center, 21 ,  the patient has noted that the wound has not been improving. The patient has had similar previous wounds in the past.       Pt is not on abx at time of initial visit.     3/22/21 - Wound VAC MWF continue to left heel. Aquacel Ag to right heel. Debrided toenails 1-5 in thickness and length. FU 1 week. IV Abx per Dr. Franc Vasquez.     21 - Wound VAC MWF to left heel. Cultures obtained. FU 1 week after visit with Dr. Franc Vasquze. PO Abx.  21 - DC wound VAC to left heel. Alginate to wound. FU 1 week  21 - Alginate and gentamicin ointment QOD. Partial WB to heel at 50% to foot with surgical shoe and walker.     5/3/21 - Alginate and gentamicin ointment QOD. MRI left heel     5/10/21 - Alginate and gentamicin ointment QOD. MRI left heel pending.     21 - Alginate and gentamicin ointment QOD. MRI reviewed confirming OM. Discussed HBOT referral and partial calcanectomy. He opts for HBOT consultation.     21 - Alginate and add gentamicin ointment QD. HBOT referral.  Patient hold off on partial calcanectomy. FU 1 week     21 - Alginate and add gentamicin ointment QD.   HBOT referral.  Patient hold off on partial calcanectomy. FU 1 week     6/28/21 - Alginate and add gentamicin ointment QD. HBOT referral.  Patient hold off on partial calcanectomy. FU 1 week. Patient gave me consent (verbal) to speak with his brother Kasia Lopez regarding his at home arrangement. HBOT is on hold until patient is at home.     7/12/21 -  Alginate and add gentamicin ointment QD. HBOT referral.  Patient considering partial calcanectomy with flap. FU 1 week.      7/19/21 - Alginate and add gentamicin ointment QD. HBOT referral.  Patient considering partial calcanectomy with flap. FU 1 week.      8/2/21 - Alginate and add gentamicin ointment QD. HBOT referral.  Patient considering partial calcanectomy with flap. FU 1 week.      8/16/21 - Alginate and add gentamicin ointment QD. HBOT referral.  Patient considering partial calcanectomy with flap. FU 1 week.      8/23/21 - Alginate and add gentamicin ointment QD. HBOT referral.  Patient considering partial calcanectomy with flap. FU 1 week.      8/30/21 - Alginate and add gentamicin ointment QD. HBOT referral.  Patient considering partial calcanectomy with flap. FU 1 week.      9/20/21 - Alginate and add gentamicin ointment QD. HBOT referral.  Patient considering partial calcanectomy with flap. FU 1 week.      10/25/21 Alginate and add gentamicin ointment QD. HBOT referral.  Patient considering partial calcanectomy with flap. FU 1 week.      11/1/21 Alginate and add gentamicin ointment QD. HBOT referral.  Patient considering partial calcanectomy with flap. FU 1 week.      11/8/21 - Alginate and add gentamicin ointment QD. HBOT referral.  Patient considering partial calcanectomy with flap. FU 1 week.      11/15/21 - Alginate and add gentamicin ointment QD. HBOT referral.  Patient considering partial calcanectomy with flap. FU 1 week.      11/22/21 -  Alginate and add gentamicin ointment QD. HBOT referral.  Patient considering partial calcanectomy with flap. FU 1 week.    11/29/21 0 Alginate and gentamicin ointment. Possible DC home. HBOT referral.  Patient considering partial calcanectomy with flap. FU 1 week.      12/6/21 - Alginate and gentamicin ointment. Possible DC home. HBOT referral.  Patient considering partial calcanectomy with flap. FU 1 week.      12/13/21 - Alginate and gentamicin ointment. DC home. HBOT referral.  [facility]  referral for wound care and PT for GAIT training and safe DME use. Patient considering partial calcanectomy with flap. FU 1 week.      12/20/21 -  Alginate and gentamicin ointment. DC home. HBOT referral.     12/27/21 - HBOT consultation complete - to start HBOT after new year. 2-14-22 patient presents today for evaluation of a left heel wound. Patient transferred here to [facility] due to the fact that he is doing HBO, for convenience due to transportation. He was prior following with Dr. Sourav Stokes. Patient overall doing well, no complaints. Tolerating dressings as well as hyperbarics. Physical exam demonstrates vascular intact. Wound appreciated posterior aspect with areas of devitalized nonviable tissue with beefy tissue noted as well. There is no purulence, odor, erythema or increase in temperature. Currently no exposed bone. 2-24-22  Patient overall doing well, no complaints. Tolerating dressings as well as hyperbarics. Physical exam demonstrates vascular intact. Wound appreciated posterior aspect with areas of devitalized nonviable tissue with beefy tissue noted as well. There is no purulence, odor, erythema or increase in temperature. 3-3-22 presents in follow-up. Relates dark area on his left great toenail as well as \"swelling\"  Left lower leg that he noticed recently. Patient denies any known trauma. Patient currently takes aspirin as well as Plavix. Physical exam demonstrates vascular intact.   Wound appreciated posterior aspect with areas of devitalized nonviable tissue with beefy tissue noted as well. There is no purulence, odor, erythema or increase in temperature. Left great toenail proximal lateral small area, subungual hematoma, stable. 2 fluctuant areas adjacent left lower leg anterior lateral aspect. There is no erythema or increase in temperature. After oral consent under sterile technique and utilizing 1% lidocaine plain, 21-gauge needle as well as 11 blade stab incision expressing approximately 20 cc of hematoma formation, this was cultured. Monitor closely if he notices any recurrence recommend [facility] for further evaluation    3-28-22 last seen March 3rd, transportation issues. Physical exam, vascular intact. Wound appreciated posterior aspect left heel with areas of devitalized nonviable tissue, increasing/seen. There is no purulence, odor, erythema or increase in temperature. Change treatment to Santyl. Patient would benefit from continued HBO.    4-4-22 Physical exam, vascular intact. Wound appreciated posterior aspect left heel with areas of devitalized nonviable tissue w/ areas of beefy tissue. There is no purulence, odor, erythema or increase in temperature. Santyl. HBO. Improved at present.          Wound/Ulcer Pain Timing/Severity: none  Quality of pain: N/A  Severity:  0 / 10   Modifying Factors: None  Associated Signs/Symptoms: none     Ulcer Identification:  Ulcer Type: arterial and diabetic  Contributing Factors: diabetes     Diabetic/Pressure/Non Pressure Ulcers onl y:        PAST MEDICAL HISTORY      Diagnosis Date    Cerebral artery occlusion with cerebral infarction ([address]     Diabetes mellitus ([address]     Type 2    Diabetic foot ulcer with osteomyelitis ([address] 12/21/2021    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 2 diabetes mellitus, with necrosis of bone ([address] 12/21/2021    Hemiparesis affecting left side as late effect of cerebrovascular accident ([address]     LEFT SIDE NON DOMINANT FOLLOWING STROKE    Hypertension     Peripheral vascular angioplasty status 12/21/2021    Ulcer of left heel, with necrosis of bone ([facility] 12/27/2021    Ulcer of left heel, with necrosis of muscle ([facility] 12/21/2021     Past Surgical History:   Procedure Laterality Date    FINGER AMPUTATION      FOOT DEBRIDEMENT Left 2/16/2021    LEFT FOOT DEBRIDEMENT WITH BONE BIOPSY, WOUND VAC APPLICATION performed by Petros Duque DPM at [place] [address] ARTHROSCOPY      TONSILLECTOMY      TRANSESOPHAGEAL ECHOCARDIOGRAM N/A 2/22/2021    TRANSESOPHAGEAL ECHOCARDIOGRAM WITH BUBBLE STUDY performed by Monica Osorio MD at [place] [address] N/A 1/4/2021    EGD BIOPSY performed by Higinio Gonzalez DO at [facility] reviewed. No pertinent family history. Social History     Tobacco Use    Smoking status: Former Smoker    Smokeless tobacco: Never Used   Vaping Use    Vaping Use: Never used   Substance Use Topics    Alcohol use: Not Currently     Comment: Former every day drinker for most of adult life    Drug use: No     Allergies   Allergen Reactions    Pcn [Penicillins]      Current Outpatient Medications on File Prior to Encounter   Medication Sig Dispense Refill    collagenase (SANTYL) 250 UNIT/GM ointment Apply topically daily left heel wound as instructed 30 g 2    gentamicin (GARAMYCIN) 0.1 % cream Apply topically with each dressing change 30 g 3    traMADol (ULTRAM) 50 MG tablet Take 50 mg by mouth 2 times daily.  diphenhydrAMINE (BENADRYL ALLERGY) 25 MG capsule Take 25 mg by mouth every 6 hours as needed for Itching      gabapentin (NEURONTIN) 100 MG capsule Take 100 mg by mouth 2 times daily.        B Complex-C-E-Zn (STRESS B/ZINC) TABS Take 1 tablet by mouth daily      acetaminophen (TYLENOL) 325 MG tablet Take 650 mg by mouth every 6 hours as needed for Pain      ferrous sulfate (IRON 325) 325 (65 Fe) MG tablet Take 325 mg by mouth daily (with breakfast)      vitamin D (ERGOCALCIFEROL) 1.25 MG (40100 UT) CAPS capsule Take 1 capsule by mouth once a week 5 capsule 0    melatonin 3 MG TABS tablet Take 9 mg by mouth nightly as needed      aspirin 81 MG EC tablet Take 81 mg by mouth daily      hydroCHLOROthiazide (HYDRODIURIL) 25 MG tablet Take 25 mg by mouth daily      metFORMIN (GLUCOPHAGE) 500 MG tablet Take 1 tablet by mouth 2 times daily (with meals) 60 tablet 3    atorvastatin (LIPITOR) 40 MG tablet Take 1 tablet by mouth nightly 30 tablet 3    clopidogrel (PLAVIX) 75 MG tablet Take 1 tablet by mouth daily 30 tablet 3     No current facility-administered medications on file prior to encounter. REVIEW OF SYSTEMS See HPI    Objective:    BP (!) 142/68   Pulse 83   Temp 97.8 °F (36.6 °C) (Temporal)   Resp 16   Ht 5' 10\" (1.778 m)   Wt 195 lb (88.5 kg)   BMI 27.98 kg/m²   Wt Readings from Last 3 Encounters:   04/04/22 195 lb (88.5 kg)   03/28/22 195 lb (88.5 kg)   03/03/22 207 lb (93.9 kg)     PHYSICAL EXAM  CONSTITUTIONAL:   Awake, alert, cooperative   EYES:  lids and lashes normal   ENT: external ears and nose without lesions   NECK:  supple, symmetrical, trachea midline   SKIN:  Open wound     Assessment:     DIABETIC ULCER. Pre Debridement Measurements:  Are located in the [place]  Documentation Flow Sheet  Post Debridement Measurements:  Wound/Ulcer Descriptions are Pre Debridement except measurements:     Wound 12/26/20 Arm Left (Active)   Number of days: 463       Wound 02/15/21 Heel Left (Active)   Number of days: 413       Wound 02/15/21 Heel Right (Active)   Number of days: 413       Wound 03/08/21 Heel Left #1 left heel aquired 12/1/20 (Active)   Wound Image   03/25/22 1358   Wound Etiology Diabetic 02/14/22 1344   Dressing Status New dressing applied 04/04/22 1328   Wound Cleansed Cleansed with saline 04/04/22 1328   Dressing/Treatment Alginate;Dry dressing; Pharmaceutical agent (see MAR) 04/04/22 1328   Offloading for Diabetic Foot Ulcers Post op shoe 04/04/22 1328   Wound Length (cm) 4.2 cm 04/04/22 1313   Wound Width (cm) 3.2 cm 04/04/22 1313   Wound Depth (cm) 0.6 cm 04/04/22 1313   Wound Surface Area (cm^2) 13.44 cm^2 04/04/22 1313   Change in Wound Size % (l*w) -11.17 04/04/22 1313   Wound Volume (cm^3) 8.064 cm^3 04/04/22 1313   Wound Healing % 5 04/04/22 1313   Post-Procedure Length (cm) 4.2 cm 04/04/22 1326   Post-Procedure Width (cm) 3.2 cm 04/04/22 1326   Post-Procedure Depth (cm) 0.7 cm 04/04/22 1326   Post-Procedure Surface Area (cm^2) 13.44 cm^2 04/04/22 1326   Post-Procedure Volume (cm^3) 9.408 cm^3 04/04/22 1326   Undermining Starts ___ O'Clock 6 03/03/22 1122   Undermining Ends___ O'Clock 11 03/03/22 1122   Undermining Maxium Distance (cm) [email or web address] 03/03/22 1122   Wound Assessment Fibrin;Pink/red 04/04/22 1313   Drainage Amount Moderate 04/04/22 1313   Drainage Description Yellow 04/04/22 1313   Odor None 04/04/22 1313   Adrianne-wound Assessment Maceration 04/04/22 1313   Number of days: 392       Wound 03/08/21 Heel Right #2 rt heel aquired 12/1/20 (Active)   Wound Image   04/05/21 0929   Dressing Status New dressing applied 03/18/21 1352   Wound Cleansed Cleansed with saline 04/01/21 1536   Dressing/Treatment Alginate;Collagen;Silicone border 36/85/14 1536   Offloading for Diabetic Foot Ulcers Post op shoe 04/01/21 1536   Wound Length (cm) 0 cm 04/05/21 0929   Wound Width (cm) 0 cm 04/05/21 0929   Wound Depth (cm) 0 cm 04/05/21 0929   Wound Surface Area (cm^2) 0 cm^2 04/05/21 0929   Change in Wound Size % (l*w) 100 04/05/21 0929   Wound Volume (cm^3) 0 cm^3 04/05/21 0929   Wound Healing % 100 04/05/21 0929   Post-Procedure Length (cm) 0 cm 04/05/21 1011   Post-Procedure Width (cm) 0 cm 04/05/21 1011   Post-Procedure Depth (cm) 0 cm 04/05/21 1011   Post-Procedure Surface Area (cm^2) 0 cm^2 04/05/21 1011   Post-Procedure Volume (cm^3) 0 cm^3 04/05/21 1011   Wound Assessment Fibrin 04/01/21 1357   Drainage Amount None 04/01/21 1357   Drainage Description Yellow 03/22/21 0911   Odor None 04/01/21 1357   Adrianne-wound Assessment Maceration 04/01/21 1357   Number of days: 392     Incision 02/19/21 Groin Right (Active)   Number of days: 409       Procedure Note  Indications:  Based on my examination of this patient's wound(s)/ulcer(s) today, debridement is required to promote healing and evaluate the wound base. Performed by: Josh Drew DPM    Consent obtained:  Yes    Time out taken:  Yes    Pain Control: Anesthetic  Anesthetic: 4% Lidocaine Liquid Topical     Debridement:Excisional Debridement    Using curette the wound(s)/ulcer(s) was/were sharply debrided down through and including the removal of subcutaneous tissue. Devitalized Tissue Debrided:  fibrin, biofilm, slough and necrotic/eschar to stimulate bleeding to promote healing, post debridement good bleeding base and wound edges noted    Wound/Ulcer #: 1    Percent of Wound/Ulcer Debrided: 100%    Total Surface Area Debrided: 13.44  sq cm     Estimated Blood Loss:  Minimal  Hemostasis Achieved:  by pressure    Procedural Pain:  0  / 10   Post Procedural Pain:  0 / 10     Response to treatment:  Well tolerated by patient. Plan:   Treatment Note please see attached Discharge Instructions    Written patient dismissal instructions given to patient and signed by patient or POA.          Discharge Instructions       Visit Discharge/Physician Orders    Assessment of pain at discharge: moderate     Anesthetic used: 4% liquid lidocaine solution      Discharge to: ECF     Left via:ambulance     Accompanied by: self     [facility]      Dressing Orders:  Cleanse left heel ulcer with normal saline solution apply santyl ointment and cover with plain alginate and dry dressing and change every day or as needed for drainage.  .      Treatment Orders:    Continue antibiotics per Dr Eduin Hooper for suppression.  Wear prevalon boots or heel ayo while in bed.          May be partial weight bearing up to 50% on left with surgical shoe.     HBO continue     Consider surgical debridement of infected bone in heel with skin graft.     Start PT evaluate and treat as indicated three times a week.        [facility] followup visit: ______________ 1 weeks Dr Harrell/Marleen________________________  (Please note your next appointment above and if you are unable to keep, kindly give a 24 hour notice. Thank you.)    Physician signature:__________________________      If you experience any of the following, please call the [facility] during business hours:    * Increase in Pain  * Temperature over 101  * Increase in drainage from your wound  * Drainage with a foul odor  * Bleeding  * Increase in swelling  * Need for compression bandage changes due to slippage, breakthrough drainage. If you need medical attention outside of the business hours of the [facility] please contact your PCP or go to the nearest emergency room.         Electronically signed by Roxanna Flower DPM on 4/4/2022 at 2:08 PM

## 2022-04-05 ENCOUNTER — HOSPITAL ENCOUNTER (OUTPATIENT)
Dept: HYPERBARIC MEDICINE | Age: 62
Setting detail: THERAPIES SERIES
Discharge: HOME | End: 2022-04-05
Payer: COMMERCIAL

## 2022-04-05 VITALS
TEMPERATURE: 97.6 F | SYSTOLIC BLOOD PRESSURE: 138 MMHG | RESPIRATION RATE: 20 BRPM | HEART RATE: 85 BPM | DIASTOLIC BLOOD PRESSURE: 68 MMHG

## 2022-04-05 DIAGNOSIS — L97.424 DIABETIC ULCER OF LEFT HEEL ASSOCIATED WITH TYPE 2 DIABETES MELLITUS, WITH NECROSIS OF BONE (HCC): Primary | ICD-10-CM

## 2022-04-05 DIAGNOSIS — E11.621 DIABETIC ULCER OF LEFT HEEL ASSOCIATED WITH TYPE 2 DIABETES MELLITUS, WITH NECROSIS OF BONE (HCC): Primary | ICD-10-CM

## 2022-04-05 DIAGNOSIS — L97.423 ULCER OF LEFT HEEL, WITH NECROSIS OF MUSCLE (HCC): ICD-10-CM

## 2022-04-05 LAB
METER GLUCOSE: 125 MG/DL (ref 74–99)
METER GLUCOSE: 176 MG/DL (ref 74–99)

## 2022-04-05 PROCEDURE — 82962 GLUCOSE BLOOD TEST: CPT

## 2022-04-05 PROCEDURE — 99183 HYPERBARIC OXYGEN THERAPY: CPT | Performed by: SURGERY

## 2022-04-05 PROCEDURE — G0277 HBOT, FULL BODY CHAMBER, 30M: HCPCS

## 2022-04-05 NOTE — PROGRESS NOTES
[facility]  Hyperbaric Oxygen Therapy   Progress Note    NAME: Eli Soriano  MEDICAL RECORD NUMBER:  [id number]  AGE: 57 y.o. GENDER: male  : 1960  EPISODE DATE:  2022   Subjective   HBO Treatment Number: 33 out of Total Treatments: 40  HBO Diagnosis:   Problem List Items Addressed This Visit     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 2 diabetes mellitus, with necrosis of bone ([address] - Primary (Chronic)    Relevant Orders    Hypoglycemial protocol    POCT glucose    Ulcer of left heel, with necrosis of muscle ([address]        Safety checks performed prior to treatment. See doc flowsheets for documentation. Objective         Recent Labs     22  1333 22  1559   GLUMET 176* 125*     Pre treatment Vital Signs       Temp: 98.4 °F (36.9 °C)     Pulse: 76     Resp: 18     BP: (!) 142/74     Post treatment Vital Signs  Temp: 97.6 °F (36.4 °C)  Pulse: 85  Resp: 20  BP: 138/68  Assessment      Physical Exam:  General Appearance:  alert and oriented to person, place and time, well-developed and well-nourished, in no acute distress  ENT:  tympanic membranes intact bilaterally  Pulmonary/Chest:  clear to auscultation bilaterally- no wheezes, rales or rhonchi, normal air movement, no respiratory distress  Cardiovascular:  regular rate and rhythm  Chamber #: 36  Treatment Start Time: 1405     Pressure Reached Time: 1415  YOLANDA : 2  Number of Air Breaks:  Treatment Status: No Air break     Decompression Time: 1546   Treatment End Time: 0507  Length of Treatment: 90 Minutes  Symptoms Noted During Treatment: None  Total Treatment Time (min): 110    Adverse Event: no    I was present on these premises and immediately available to furnish assistance & direction throughout the procedure. Plan      Eli Soriano is a 57 y.o. male  did successfully complete today's hyperbaric oxygen treatment at [address] [place] [address].     In my clinical judgement, ongoing HBO therapy is  necessary at this time, given a threat to patient function, limb or life from the current condition. Supervision and attendance of Hyperbaric Oxygen Therapy provided. Continue HBO treatment as outlined in the treatment plan. Hyperbaric Oxygen: Jane Chase tolerated Treatment Number: 32 well today without complications.     Discharge Instructions were explained and given to  Antionette Nichols     Electronically signed by Luciano Yan MD on 4/5/2022 at 4:44 PM

## 2022-04-06 ENCOUNTER — HOSPITAL ENCOUNTER (OUTPATIENT)
Dept: HYPERBARIC MEDICINE | Age: 62
Setting detail: THERAPIES SERIES
End: 2022-04-06
Payer: COMMERCIAL

## 2022-04-06 NOTE — PROGRESS NOTES
Londell Nissen is a 57 y.o. male has been receiving hyperbaric oxygen treatment for management of: Indication  Indications: Lower Extremity Diabetic Wound ___(site) (LLE)  Layne Ordaz Radha 3. Mr. Johann Mckinnon has completed Treatment Number: 28 out of a treatment protocol of Total Treatments: 30.    Problem List:  Patient Active Problem List   Diagnosis Code    CVA (cerebral vascular accident) ([address] I63.9    Diabetes mellitus ([address] E11.9    Diabetic foot infection ([address] E11.628, L08.9    Hypertension I10    Hemiparesis affecting left side as late effect of cerebrovascular accident [facility]) R88.578    Peripheral arterial disease ([address] I73.9    Urinary tract infection due to Proteus N39.0, B96.4    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 2 diabetes mellitus, with necrosis of bone ([address] E11.621, L97.424    Ulcer of left heel, with necrosis of muscle (HCC) L97.423    Atherosclerosis of native artery of left lower extremity with ulceration of heel (HCC) I70.244    Peripheral vascular angioplasty status Z98.62    Ulcer of left heel, with necrosis of bone ([address] L97.424       Patients ID was verified. Hyperbaric oxygen therapy plan of care, patient history, outpatient fall risk assessment, nutritional assessment and daily medications were reviewed, addressed and updated as needed. Pt is tolerating hyperbaric oxygen therapy  well without complications.          Stephani Irvin RN  4/4/2022 3:01 PM

## 2022-04-07 ENCOUNTER — APPOINTMENT (OUTPATIENT)
Dept: HYPERBARIC MEDICINE | Age: 62
End: 2022-04-07
Payer: COMMERCIAL

## 2022-04-08 ENCOUNTER — HOSPITAL ENCOUNTER (OUTPATIENT)
Dept: HYPERBARIC MEDICINE | Age: 62
Setting detail: THERAPIES SERIES
Discharge: HOME | End: 2022-04-08
Payer: COMMERCIAL

## 2022-04-08 VITALS
RESPIRATION RATE: 16 BRPM | HEIGHT: 70 IN | BODY MASS INDEX: 27.92 KG/M2 | SYSTOLIC BLOOD PRESSURE: 168 MMHG | DIASTOLIC BLOOD PRESSURE: 76 MMHG | HEART RATE: 86 BPM | TEMPERATURE: 97.1 F | WEIGHT: 195 LBS

## 2022-04-08 DIAGNOSIS — L97.424 DIABETIC ULCER OF LEFT HEEL ASSOCIATED WITH TYPE 2 DIABETES MELLITUS, WITH NECROSIS OF BONE (HCC): Primary | ICD-10-CM

## 2022-04-08 DIAGNOSIS — E11.621 DIABETIC ULCER OF LEFT HEEL ASSOCIATED WITH TYPE 2 DIABETES MELLITUS, WITH NECROSIS OF BONE (HCC): Primary | ICD-10-CM

## 2022-04-08 LAB
METER GLUCOSE: 124 MG/DL (ref 74–99)
METER GLUCOSE: 140 MG/DL (ref 74–99)

## 2022-04-08 PROCEDURE — 82962 GLUCOSE BLOOD TEST: CPT

## 2022-04-08 PROCEDURE — 99183 HYPERBARIC OXYGEN THERAPY: CPT | Performed by: SURGERY

## 2022-04-08 PROCEDURE — G0277 HBOT, FULL BODY CHAMBER, 30M: HCPCS

## 2022-04-08 NOTE — PROGRESS NOTES
[facility]  Hyperbaric Oxygen Therapy   Progress Note    NAME: Jena Young  MEDICAL RECORD NUMBER:  [id number]  AGE: 57 y.o. GENDER: male  : 1960  EPISODE DATE:  2022   Subjective   HBO Treatment Number: 34 out of Total Treatments: 40  HBO Diagnosis:   Problem List Items Addressed This Visit     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 2 diabetes mellitus, with necrosis of bone ([address] - Primary (Chronic)    Relevant Orders    Notify physician (specify)    Hyperbaric Oxygen Therapy    Hypoglycemial protocol    POCT glucose    Care order/instruction    Care order/instruction    Care order/instruction    Care order/instruction        Safety checks performed prior to treatment. See doc flowsheets for documentation. Objective         Recent Labs     22  1318 22  1602   GLUMET 140* 124*     Pre treatment Vital Signs       Temp: 97.8 °F (36.6 °C)     Pulse: 105     Resp: 20     BP: 134/72     Post treatment Vital Signs  Temp: 97.1 °F (36.2 °C)  Pulse: 86  Resp: 16  BP: (!) 168/76  Assessment      Physical Exam:  General Appearance:  alert and oriented to person, place and time, well-developed and well-nourished, in no acute distress  ENT:  tympanic membranes intact bilaterally  Pulmonary/Chest:  clear to auscultation bilaterally- no wheezes, rales or rhonchi, normal air movement, no respiratory distress  Cardiovascular:  regular rate and rhythm  Chamber #: 36  Treatment Start Time: 1409     Pressure Reached Time: 1420  YOLANDA : 2  Number of Air Breaks:  Treatment Status: No Air break     Decompression Time: 1551   Treatment End Time: 1600  Length of Treatment: 90 Minutes  Symptoms Noted During Treatment: None  Total Treatment Time (min): 111    Adverse Event: no    I was present on these premises and immediately available to furnish assistance & direction throughout the procedure.    Plan      Jena Young is a 57 y.o. male  did successfully complete today's hyperbaric oxygen treatment at [address] [place] [address]. In my clinical judgement, ongoing HBO therapy is  necessary at this time, given a threat to patient function, limb or life from the current condition. Supervision and attendance of Hyperbaric Oxygen Therapy provided. Continue HBO treatment as outlined in the treatment plan. Hyperbaric Oxygen: Cindy Bearden tolerated Treatment Number: 29 well today without complications.     Discharge Instructions were explained and given to Mr. Shaan Hi     Electronically signed by Norah Patel MD on 4/8/2022 at 4:43 PM

## 2022-04-11 ENCOUNTER — HOSPITAL ENCOUNTER (OUTPATIENT)
Dept: HYPERBARIC MEDICINE | Age: 62
Setting detail: THERAPIES SERIES
Discharge: HOME | End: 2022-04-11
Payer: COMMERCIAL

## 2022-04-11 ENCOUNTER — HOSPITAL ENCOUNTER (OUTPATIENT)
Dept: WOUND CARE | Age: 62
Discharge: HOME | End: 2022-04-11
Payer: COMMERCIAL

## 2022-04-11 VITALS
RESPIRATION RATE: 18 BRPM | TEMPERATURE: 96.9 F | SYSTOLIC BLOOD PRESSURE: 140 MMHG | DIASTOLIC BLOOD PRESSURE: 72 MMHG | HEART RATE: 80 BPM

## 2022-04-11 VITALS
DIASTOLIC BLOOD PRESSURE: 72 MMHG | SYSTOLIC BLOOD PRESSURE: 136 MMHG | HEART RATE: 88 BPM | TEMPERATURE: 96.7 F | RESPIRATION RATE: 16 BRPM

## 2022-04-11 DIAGNOSIS — L97.424 DIABETIC ULCER OF LEFT HEEL ASSOCIATED WITH TYPE 2 DIABETES MELLITUS, WITH NECROSIS OF BONE (HCC): Primary | ICD-10-CM

## 2022-04-11 DIAGNOSIS — E11.621 DIABETIC ULCER OF LEFT HEEL ASSOCIATED WITH TYPE 2 DIABETES MELLITUS, WITH NECROSIS OF BONE (HCC): Primary | ICD-10-CM

## 2022-04-11 DIAGNOSIS — E11.628 DIABETIC FOOT INFECTION (HCC): ICD-10-CM

## 2022-04-11 DIAGNOSIS — L08.9 DIABETIC FOOT INFECTION (HCC): ICD-10-CM

## 2022-04-11 LAB
METER GLUCOSE: 126 MG/DL (ref 74–99)
METER GLUCOSE: 131 MG/DL (ref 74–99)

## 2022-04-11 PROCEDURE — G0277 HBOT, FULL BODY CHAMBER, 30M: HCPCS

## 2022-04-11 PROCEDURE — 6370000000 HC RX 637 (ALT 250 FOR IP): Performed by: PODIATRIST

## 2022-04-11 PROCEDURE — 99183 HYPERBARIC OXYGEN THERAPY: CPT | Performed by: SURGERY

## 2022-04-11 PROCEDURE — 82962 GLUCOSE BLOOD TEST: CPT

## 2022-04-11 PROCEDURE — 11042 DBRDMT SUBQ TIS 1ST 20SQCM/<: CPT

## 2022-04-11 RX ORDER — LIDOCAINE HYDROCHLORIDE 40 MG/ML
SOLUTION TOPICAL ONCE
Status: CANCELLED | OUTPATIENT
Start: 2022-04-11 | End: 2022-04-11

## 2022-04-11 RX ORDER — GENTAMICIN SULFATE 1 MG/G
OINTMENT TOPICAL ONCE
Status: CANCELLED | OUTPATIENT
Start: 2022-04-11 | End: 2022-04-11

## 2022-04-11 RX ORDER — GINSENG 100 MG
CAPSULE ORAL ONCE
Status: CANCELLED | OUTPATIENT
Start: 2022-04-11 | End: 2022-04-11

## 2022-04-11 RX ORDER — LIDOCAINE 40 MG/G
CREAM TOPICAL ONCE
Status: CANCELLED | OUTPATIENT
Start: 2022-04-11 | End: 2022-04-11

## 2022-04-11 RX ORDER — LIDOCAINE HYDROCHLORIDE 20 MG/ML
JELLY TOPICAL ONCE
Status: CANCELLED | OUTPATIENT
Start: 2022-04-11 | End: 2022-04-11

## 2022-04-11 RX ORDER — LIDOCAINE 50 MG/G
OINTMENT TOPICAL ONCE
Status: CANCELLED | OUTPATIENT
Start: 2022-04-11 | End: 2022-04-11

## 2022-04-11 RX ORDER — BACITRACIN ZINC AND POLYMYXIN B SULFATE 500; 1000 [USP'U]/G; [USP'U]/G
OINTMENT TOPICAL ONCE
Status: CANCELLED | OUTPATIENT
Start: 2022-04-11 | End: 2022-04-11

## 2022-04-11 RX ORDER — CLOBETASOL PROPIONATE 0.5 MG/G
OINTMENT TOPICAL ONCE
Status: CANCELLED | OUTPATIENT
Start: 2022-04-11 | End: 2022-04-11

## 2022-04-11 RX ORDER — BACITRACIN, NEOMYCIN, POLYMYXIN B 400; 3.5; 5 [USP'U]/G; MG/G; [USP'U]/G
OINTMENT TOPICAL ONCE
Status: CANCELLED | OUTPATIENT
Start: 2022-04-11 | End: 2022-04-11

## 2022-04-11 RX ORDER — LIDOCAINE HYDROCHLORIDE 40 MG/ML
SOLUTION TOPICAL ONCE
Status: COMPLETED | OUTPATIENT
Start: 2022-04-11 | End: 2022-04-11

## 2022-04-11 RX ORDER — BETAMETHASONE DIPROPIONATE 0.05 %
OINTMENT (GRAM) TOPICAL ONCE
Status: CANCELLED | OUTPATIENT
Start: 2022-04-11 | End: 2022-04-11

## 2022-04-11 RX ADMIN — LIDOCAINE HYDROCHLORIDE 3 ML: 40 SOLUTION TOPICAL at 13:30

## 2022-04-11 ASSESSMENT — PAIN DESCRIPTION - LOCATION: LOCATION: FOOT

## 2022-04-11 ASSESSMENT — PAIN DESCRIPTION - ONSET: ONSET: ON-GOING

## 2022-04-11 ASSESSMENT — PAIN DESCRIPTION - PAIN TYPE: TYPE: CHRONIC PAIN

## 2022-04-11 ASSESSMENT — PAIN DESCRIPTION - PROGRESSION: CLINICAL_PROGRESSION: NOT CHANGED

## 2022-04-11 ASSESSMENT — PAIN DESCRIPTION - FREQUENCY: FREQUENCY: INTERMITTENT

## 2022-04-11 ASSESSMENT — PAIN DESCRIPTION - DESCRIPTORS: DESCRIPTORS: SORE

## 2022-04-11 ASSESSMENT — PAIN DESCRIPTION - ORIENTATION: ORIENTATION: LEFT

## 2022-04-11 ASSESSMENT — PAIN - FUNCTIONAL ASSESSMENT: PAIN_FUNCTIONAL_ASSESSMENT: ACTIVITIES ARE NOT PREVENTED

## 2022-04-11 NOTE — PROGRESS NOTES
Wound Healing Center Followup Visit Note    Referring Physician : Elvin Bowman MD  [address] [facility] RECORD NUMBER:  [id number]  AGE: 57 y.o. GENDER: male  : 1960  EPISODE DATE:  2022    Subjective:     Chief Complaint   Patient presents with    Wound Check     LEFT HEEL      HISTORY of PRESENT ILLNESS HPI   Madelaine Diaz is a 57 y.o. male who presents today in regards to follow up evaluation and treatment of wound/ulcer. That patient's past medical, family and social hx were reviewed and changes were made if present. History of Wound Context:  The patient has had a wound of bilateral heels which was first noted approximately over a month ago. This has been treated with surgical debridement. On their initial visit to the wound healing center, 21 ,  the patient has noted that the wound has not been improving. The patient has had similar previous wounds in the past.       Pt is not on abx at time of initial visit.     3/22/21 - Wound VAC MWF continue to left heel. Aquacel Ag to right heel. Debrided toenails 1-5 in thickness and length. FU 1 week. IV Abx per Dr. Shahida Sagastume.     21 - Wound VAC MWF to left heel. Cultures obtained. FU 1 week after visit with Dr. Shahida Sagastume. PO Abx.  21 - DC wound VAC to left heel. Alginate to wound. FU 1 week  21 - Alginate and gentamicin ointment QOD. Partial WB to heel at 50% to foot with surgical shoe and walker.     5/3/21 - Alginate and gentamicin ointment QOD. MRI left heel     5/10/21 - Alginate and gentamicin ointment QOD. MRI left heel pending.     21 - Alginate and gentamicin ointment QOD. MRI reviewed confirming OM. Discussed HBOT referral and partial calcanectomy. He opts for HBOT consultation.     21 - Alginate and add gentamicin ointment QD. HBOT referral.  Patient hold off on partial calcanectomy. FU 1 week     21 - Alginate and add gentamicin ointment QD.   HBOT referral.  Patient hold off on partial calcanectomy. FU 1 week     6/28/21 - Alginate and add gentamicin ointment QD. HBOT referral.  Patient hold off on partial calcanectomy. FU 1 week. Patient gave me consent (verbal) to speak with his brother Quintin Lr regarding his at home arrangement. HBOT is on hold until patient is at home.     7/12/21 -  Alginate and add gentamicin ointment QD. HBOT referral.  Patient considering partial calcanectomy with flap. FU 1 week.      7/19/21 - Alginate and add gentamicin ointment QD. HBOT referral.  Patient considering partial calcanectomy with flap. FU 1 week.      8/2/21 - Alginate and add gentamicin ointment QD. HBOT referral.  Patient considering partial calcanectomy with flap. FU 1 week.      8/16/21 - Alginate and add gentamicin ointment QD. HBOT referral.  Patient considering partial calcanectomy with flap. FU 1 week.      8/23/21 - Alginate and add gentamicin ointment QD. HBOT referral.  Patient considering partial calcanectomy with flap. FU 1 week.      8/30/21 - Alginate and add gentamicin ointment QD. HBOT referral.  Patient considering partial calcanectomy with flap. FU 1 week.      9/20/21 - Alginate and add gentamicin ointment QD. HBOT referral.  Patient considering partial calcanectomy with flap. FU 1 week.      10/25/21 Alginate and add gentamicin ointment QD. HBOT referral.  Patient considering partial calcanectomy with flap. FU 1 week.      11/1/21 Alginate and add gentamicin ointment QD. HBOT referral.  Patient considering partial calcanectomy with flap. FU 1 week.      11/8/21 - Alginate and add gentamicin ointment QD. HBOT referral.  Patient considering partial calcanectomy with flap. FU 1 week.      11/15/21 - Alginate and add gentamicin ointment QD. HBOT referral.  Patient considering partial calcanectomy with flap. FU 1 week.      11/22/21 -  Alginate and add gentamicin ointment QD. HBOT referral.  Patient considering partial calcanectomy with flap. FU 1 week.    11/29/21 0 Alginate and gentamicin ointment. Possible DC home. HBOT referral.  Patient considering partial calcanectomy with flap. FU 1 week.      12/6/21 - Alginate and gentamicin ointment. Possible DC home. HBOT referral.  Patient considering partial calcanectomy with flap. FU 1 week.      12/13/21 - Alginate and gentamicin ointment. DC home. HBOT referral.  [facility]  referral for wound care and PT for GAIT training and safe DME use. Patient considering partial calcanectomy with flap. FU 1 week.      12/20/21 -  Alginate and gentamicin ointment. DC home. HBOT referral.     12/27/21 - HBOT consultation complete - to start HBOT after new year. 2-14-22 patient presents today for evaluation of a left heel wound. Patient transferred here to [facility] due to the fact that he is doing HBO, for convenience due to transportation. He was prior following with Dr. Светлана Hansen. Patient overall doing well, no complaints. Tolerating dressings as well as hyperbarics. Physical exam demonstrates vascular intact. Wound appreciated posterior aspect with areas of devitalized nonviable tissue with beefy tissue noted as well. There is no purulence, odor, erythema or increase in temperature. Currently no exposed bone. 2-24-22  Patient overall doing well, no complaints. Tolerating dressings as well as hyperbarics. Physical exam demonstrates vascular intact. Wound appreciated posterior aspect with areas of devitalized nonviable tissue with beefy tissue noted as well. There is no purulence, odor, erythema or increase in temperature. 3-3-22 presents in follow-up. Relates dark area on his left great toenail as well as \"swelling\"  Left lower leg that he noticed recently. Patient denies any known trauma. Patient currently takes aspirin as well as Plavix. Physical exam demonstrates vascular intact.   Wound appreciated posterior aspect with areas of devitalized nonviable tissue with beefy tissue noted as well. There is no purulence, odor, erythema or increase in temperature. Left great toenail proximal lateral small area, subungual hematoma, stable. 2 fluctuant areas adjacent left lower leg anterior lateral aspect. There is no erythema or increase in temperature. After oral consent under sterile technique and utilizing 1% lidocaine plain, 21-gauge needle as well as 11 blade stab incision expressing approximately 20 cc of hematoma formation, this was cultured. Monitor closely if he notices any recurrence recommend [facility] for further evaluation    3-28-22 last seen March 3rd, transportation issues. Physical exam, vascular intact. Wound appreciated posterior aspect left heel with areas of devitalized nonviable tissue, increasing/seen. There is no purulence, odor, erythema or increase in temperature. Change treatment to Santyl. Patient would benefit from continued HBO.    4-4-22 Physical exam, vascular intact. Wound appreciated posterior aspect left heel with areas of devitalized nonviable tissue w/ areas of beefy tissue. There is no purulence, odor, erythema or increase in temperature. Santyl. HBO. Improved at present. 4-11-22 Wound appreciated posterior aspect left heel with areas of devitalized nonviable tissue w/ areas of beefy tissue. There is no purulence, odor, erythema or increase in temperature. Santyl. HBO.       Wound/Ulcer Pain Timing/Severity: none  Quality of pain: N/A  Severity:  0 / 10   Modifying Factors: None  Associated Signs/Symptoms: none     Ulcer Identification:  Ulcer Type: arterial and diabetic  Contributing Factors: diabetes     Diabetic/Pressure/Non Pressure Ulcers onl y:        PAST MEDICAL HISTORY      Diagnosis Date    Cerebral artery occlusion with cerebral infarction [facility]     Diabetes mellitus ([facility]     Type 2    Diabetic foot ulcer with osteomyelitis ([facility] 12/21/2021    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 2 diabetes mellitus, with necrosis of bone ([address] 12/21/2021    Hemiparesis affecting left side as late effect of cerebrovascular accident ([address]     LEFT SIDE NON DOMINANT FOLLOWING STROKE    Hypertension     Peripheral vascular angioplasty status 12/21/2021    Ulcer of left heel, with necrosis of bone ([address] 12/27/2021    Ulcer of left heel, with necrosis of muscle ([address] 12/21/2021     Past Surgical History:   Procedure Laterality Date    FINGER AMPUTATION      FOOT DEBRIDEMENT Left 2/16/2021    LEFT FOOT DEBRIDEMENT WITH BONE BIOPSY, WOUND VAC APPLICATION performed by Delta Emery DPM at [place] [address] ARTHROSCOPY      TONSILLECTOMY      TRANSESOPHAGEAL ECHOCARDIOGRAM N/A 2/22/2021    TRANSESOPHAGEAL ECHOCARDIOGRAM WITH BUBBLE STUDY performed by Leona Langston MD at [address] [place] [address] N/A 1/4/2021    EGD BIOPSY performed by Shelby Palomo DO at [place] [address] reviewed. No pertinent family history. Social History     Tobacco Use    Smoking status: Former Smoker    Smokeless tobacco: Never Used   Vaping Use    Vaping Use: Never used   Substance Use Topics    Alcohol use: Not Currently     Comment: Former every day drinker for most of adult life    Drug use: No     Allergies   Allergen Reactions    Pcn [Penicillins]      Current Outpatient Medications on File Prior to Encounter   Medication Sig Dispense Refill    gentamicin (GARAMYCIN) 0.1 % cream Apply topically with each dressing change 30 g 3    traMADol (ULTRAM) 50 MG tablet Take 50 mg by mouth 2 times daily.  diphenhydrAMINE (BENADRYL ALLERGY) 25 MG capsule Take 25 mg by mouth every 6 hours as needed for Itching      gabapentin (NEURONTIN) 100 MG capsule Take 100 mg by mouth 2 times daily.        B Complex-C-E-Zn (STRESS B/ZINC) TABS Take 1 tablet by mouth daily      acetaminophen (TYLENOL) 325 MG tablet Take 650 mg by mouth every 6 hours as needed for Pain      ferrous sulfate (IRON 325) 325 (65 Fe) MG tablet Take 325 mg by mouth daily (with breakfast)      vitamin D (ERGOCALCIFEROL) 1.25 MG (05708 UT) CAPS capsule Take 1 capsule by mouth once a week 5 capsule 0    melatonin 3 MG TABS tablet Take 9 mg by mouth nightly as needed      aspirin 81 MG EC tablet Take 81 mg by mouth daily      hydroCHLOROthiazide (HYDRODIURIL) 25 MG tablet Take 25 mg by mouth daily      metFORMIN (GLUCOPHAGE) 500 MG tablet Take 1 tablet by mouth 2 times daily (with meals) 60 tablet 3    atorvastatin (LIPITOR) 40 MG tablet Take 1 tablet by mouth nightly 30 tablet 3    clopidogrel (PLAVIX) 75 MG tablet Take 1 tablet by mouth daily 30 tablet 3     No current facility-administered medications on file prior to encounter. REVIEW OF SYSTEMS See HPI    Objective:    BP (!) 140/72   Pulse 80   Temp 96.9 °F (36.1 °C) (Temporal)   Resp 18   Wt Readings from Last 3 Encounters:   04/08/22 195 lb (88.5 kg)   04/04/22 195 lb (88.5 kg)   03/28/22 195 lb (88.5 kg)     PHYSICAL EXAM  CONSTITUTIONAL:   Awake, alert, cooperative   EYES:  lids and lashes normal   ENT: external ears and nose without lesions   NECK:  supple, symmetrical, trachea midline   SKIN:  Open wound     Assessment:     DIABETIC ULCER. Pre Debridement Measurements:  Are located in the [place]  Documentation Flow Sheet  Post Debridement Measurements:  Wound/Ulcer Descriptions are Pre Debridement except measurements:     Wound 12/26/20 Arm Left (Active)   Number of days: 470       Wound 02/15/21 Heel Left (Active)   Number of days: 420       Wound 02/15/21 Heel Right (Active)   Number of days: 420       Wound 03/08/21 Heel Left #1 left heel aquired 12/1/20 (Active)   Wound Image   03/25/22 1358   Wound Etiology Diabetic 02/14/22 1344   Dressing Status New dressing applied 04/04/22 1328   Wound Cleansed Cleansed with saline 04/04/22 1328   Dressing/Treatment Alginate;Dry dressing; Pharmaceutical agent (see MAR) 04/04/22 1328   Offloading for Diabetic Foot Ulcers Post op shoe 04/04/22 1328   Wound Length (cm) 4.1 cm 04/11/22 1325   Wound Width (cm) 3.2 cm 04/11/22 1325   Wound Depth (cm) 0.5 cm 04/11/22 1325   Wound Surface Area (cm^2) 13.12 cm^2 04/11/22 1325   Change in Wound Size % (l*w) -8.52 04/11/22 1325   Wound Volume (cm^3) 6.56 cm^3 04/11/22 1325   Wound Healing % 22 04/11/22 1325   Post-Procedure Length (cm) 4.1 cm 04/11/22 1335   Post-Procedure Width (cm) 3.2 cm 04/11/22 1335   Post-Procedure Depth (cm) 0.6 cm 04/11/22 1335   Post-Procedure Surface Area (cm^2) 13.12 cm^2 04/11/22 1335   Post-Procedure Volume (cm^3) 7.872 cm^3 04/11/22 1335   Undermining Starts ___ O'Clock 6 03/03/22 1122   Undermining Ends___ O'Clock 11 03/03/22 1122   Undermining Maxium Distance (cm) [email or web address] 03/03/22 1122   Wound Assessment Fibrin;Pale granulation tissue;Pink/red 04/11/22 1325   Drainage Amount Moderate 04/11/22 1325   Drainage Description Serosanguinous 04/11/22 1325   Odor None 04/11/22 1325   Adrianne-wound Assessment Maceration 04/11/22 1325   Number of days: 399       Wound 03/08/21 Heel Right #2 rt heel aquired 12/1/20 (Active)   Wound Image   04/05/21 0929   Dressing Status New dressing applied 03/18/21 1352   Wound Cleansed Cleansed with saline 04/01/21 1536   Dressing/Treatment Alginate;Collagen;Silicone border 52/38/68 1536   Offloading for Diabetic Foot Ulcers Post op shoe 04/01/21 1536   Wound Length (cm) 0 cm 04/05/21 0929   Wound Width (cm) 0 cm 04/05/21 0929   Wound Depth (cm) 0 cm 04/05/21 0929   Wound Surface Area (cm^2) 0 cm^2 04/05/21 0929   Change in Wound Size % (l*w) 100 04/05/21 0929   Wound Volume (cm^3) 0 cm^3 04/05/21 0929   Wound Healing % 100 04/05/21 0929   Post-Procedure Length (cm) 0 cm 04/05/21 1011   Post-Procedure Width (cm) 0 cm 04/05/21 1011   Post-Procedure Depth (cm) 0 cm 04/05/21 1011   Post-Procedure Surface Area (cm^2) 0 cm^2 04/05/21 1011   Post-Procedure Volume (cm^3) 0 cm^3 04/05/21 1011   Wound Assessment Fibrin 04/01/21 1357   Drainage Amount None 04/01/21 1357   Drainage Description Yellow 03/22/21 0911   Odor None 04/01/21 1357   Adrianne-wound Assessment Maceration 04/01/21 1357   Number of days: 399     Incision 02/19/21 Groin Right (Active)   Number of days: 416       Procedure Note  Indications:  Based on my examination of this patient's wound(s)/ulcer(s) today, debridement is required to promote healing and evaluate the wound base. Performed by: Lisa Saucedo DPM    Consent obtained:  Yes    Time out taken:  Yes    Pain Control: Anesthetic  Anesthetic: 4% Lidocaine Liquid Topical     Debridement:Excisional Debridement    Using curette the wound(s)/ulcer(s) was/were sharply debrided down through and including the removal of subcutaneous tissue. Devitalized Tissue Debrided:  fibrin, biofilm, slough and necrotic/eschar to stimulate bleeding to promote healing, post debridement good bleeding base and wound edges noted    Wound/Ulcer #: 1    Percent of Wound/Ulcer Debrided: 100%    Total Surface Area Debrided: 13.12  sq cm     Estimated Blood Loss:  Minimal  Hemostasis Achieved:  by pressure    Procedural Pain:  0  / 10   Post Procedural Pain:  0 / 10     Response to treatment:  Well tolerated by patient. Plan:   Treatment Note please see attached Discharge Instructions    Written patient dismissal instructions given to patient and signed by patient or POA.          Discharge Instructions       Visit Discharge/Physician Orders     Assessment of pain at discharge: moderate     Anesthetic used: 4% liquid lidocaine solution      Discharge to:      Left via:ambulance     Accompanied by: self     [facility]: [facility]      Dressing Orders:  Cleanse left heel ulcer with normal saline solution apply santyl ointment and cover with plain alginate and dry dressing and change every day or as needed for drainage.  .      Treatment Orders:    Continue antibiotics per Dr Marni Stanton for suppression.  Wear prevalon boots or heel ayo while in bed.          May be partial weight bearing up to 50% on left with surgical shoe.     HBO continue     Consider surgical debridement of infected bone in heel with skin graft.     Start PT evaluate and treat as indicated three times a week.        [facility] followup visit: ______________ 1 weeks Dr Harrell/Marleen________________________  (Please note your next appointment above and if you are unable to keep, kindly give a 24 hour notice.  Thank you.)     Physician signature:__________________________        If you experience any of the following, please call the [facility] during business hours:     * Increase in Pain  * Temperature over 101  * Increase in drainage from your wound  * Drainage with a foul odor  * Bleeding  * Increase in swelling  * Need for compression bandage changes due to slippage, breakthrough drainage.     If you need medical attention outside of the business hours of the [facility] please contact your PCP or go to the nearest emergency room.                 Electronically signed by Eloy Ragland DPM on 4/11/2022 at 1:38 PM

## 2022-04-12 ENCOUNTER — HOSPITAL ENCOUNTER (OUTPATIENT)
Dept: HYPERBARIC MEDICINE | Age: 62
Setting detail: THERAPIES SERIES
Discharge: HOME | End: 2022-04-12
Payer: COMMERCIAL

## 2022-04-12 VITALS
SYSTOLIC BLOOD PRESSURE: 140 MMHG | DIASTOLIC BLOOD PRESSURE: 66 MMHG | RESPIRATION RATE: 16 BRPM | HEART RATE: 75 BPM | TEMPERATURE: 97.5 F

## 2022-04-12 DIAGNOSIS — L97.424 DIABETIC ULCER OF LEFT HEEL ASSOCIATED WITH TYPE 2 DIABETES MELLITUS, WITH NECROSIS OF BONE (HCC): Primary | ICD-10-CM

## 2022-04-12 DIAGNOSIS — E11.621 DIABETIC ULCER OF LEFT HEEL ASSOCIATED WITH TYPE 2 DIABETES MELLITUS, WITH NECROSIS OF BONE (HCC): Primary | ICD-10-CM

## 2022-04-12 LAB
METER GLUCOSE: 163 MG/DL (ref 74–99)
METER GLUCOSE: 167 MG/DL (ref 74–99)

## 2022-04-12 PROCEDURE — G0277 HBOT, FULL BODY CHAMBER, 30M: HCPCS

## 2022-04-12 PROCEDURE — 82962 GLUCOSE BLOOD TEST: CPT

## 2022-04-12 PROCEDURE — 99183 HYPERBARIC OXYGEN THERAPY: CPT | Performed by: SURGERY

## 2022-04-12 NOTE — PROGRESS NOTES
[facility]  Hyperbaric Oxygen Therapy   Progress Note    NAME: Josseline Meek  MEDICAL RECORD NUMBER:  [id number]  AGE: 57 y.o. GENDER: male  : 1960  EPISODE DATE:  2022   Subjective   HBO Treatment Number: 39 out of Total Treatments: 40  HBO Diagnosis:   Problem List Items Addressed This Visit     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 2 diabetes mellitus, with necrosis of bone ([address] - Primary (Chronic)    Relevant Orders    Hypoglycemial protocol    POCT glucose    Care order/instruction    Care order/instruction    Notify physician (specify)    Hyperbaric Oxygen Therapy        Safety checks performed prior to treatment. See doc flowsheets for documentation. Objective         Recent Labs     22  1322 22  1544   GLUMET 163* 167*     Pre treatment Vital Signs       Temp: 97.2 °F (36.2 °C)     Pulse: 80     Resp: 16     BP: 115/74     Post treatment Vital Signs  Temp: 97.5 °F (36.4 °C)  Pulse: 75  Resp: 16  BP: (!) 140/66  Assessment      Physical Exam:  General Appearance:  alert and oriented to person, place and time, well-developed and well-nourished, in no acute distress  ENT:  tympanic membranes intact bilaterally  Pulmonary/Chest:  clear to auscultation bilaterally- no wheezes, rales or rhonchi, normal air movement, no respiratory distress  Cardiovascular:  regular rate and rhythm  Chamber #: 36  Treatment Start Time: 1347     Pressure Reached Time: 1400  YOLANDA : 2  Number of Air Breaks:  Treatment Status: No Air break     Decompression Time: 1531   Treatment End Time: 1541  Length of Treatment: 90 Minutes  Symptoms Noted During Treatment: None  Total Treatment Time (min): 114    Adverse Event: no    I was present on these premises and immediately available to furnish assistance & direction throughout the procedure.    Plan      Josseline Meek is a 57 y.o. male  did successfully complete today's hyperbaric oxygen treatment at [address] [facility] [address] [facility]. In my clinical judgement, ongoing HBO therapy is  necessary at this time, given a threat to patient function, limb or life from the current condition. Supervision and attendance of Hyperbaric Oxygen Therapy provided. Continue HBO treatment as outlined in the treatment plan. Hyperbaric Oxygen: [place] tolerated Treatment Number: 39 well today without complications.     Discharge Instructions were explained and given to  Lula      Electronically signed by Lorelei Fonseca MD on 4/12/2022 at 6:10 PM

## 2022-04-13 ENCOUNTER — HOSPITAL ENCOUNTER (OUTPATIENT)
Dept: HYPERBARIC MEDICINE | Age: 62
Setting detail: THERAPIES SERIES
Discharge: HOME | End: 2022-04-13
Payer: COMMERCIAL

## 2022-04-13 VITALS
RESPIRATION RATE: 16 BRPM | TEMPERATURE: 97.5 F | DIASTOLIC BLOOD PRESSURE: 70 MMHG | SYSTOLIC BLOOD PRESSURE: 118 MMHG | HEART RATE: 72 BPM

## 2022-04-13 DIAGNOSIS — E11.621 DIABETIC ULCER OF LEFT HEEL ASSOCIATED WITH TYPE 2 DIABETES MELLITUS, WITH NECROSIS OF BONE (HCC): Primary | ICD-10-CM

## 2022-04-13 DIAGNOSIS — L97.424 DIABETIC ULCER OF LEFT HEEL ASSOCIATED WITH TYPE 2 DIABETES MELLITUS, WITH NECROSIS OF BONE (HCC): Primary | ICD-10-CM

## 2022-04-13 LAB
METER GLUCOSE: 163 MG/DL (ref 74–99)
METER GLUCOSE: 92 MG/DL (ref 74–99)

## 2022-04-13 PROCEDURE — 99183 HYPERBARIC OXYGEN THERAPY: CPT | Performed by: SURGERY

## 2022-04-13 PROCEDURE — G0277 HBOT, FULL BODY CHAMBER, 30M: HCPCS

## 2022-04-13 PROCEDURE — 82962 GLUCOSE BLOOD TEST: CPT

## 2022-04-14 ENCOUNTER — HOSPITAL ENCOUNTER (OUTPATIENT)
Dept: HYPERBARIC MEDICINE | Age: 62
Setting detail: THERAPIES SERIES
Discharge: HOME | End: 2022-04-14
Payer: COMMERCIAL

## 2022-04-14 VITALS
SYSTOLIC BLOOD PRESSURE: 144 MMHG | HEART RATE: 72 BPM | TEMPERATURE: 97.6 F | RESPIRATION RATE: 16 BRPM | DIASTOLIC BLOOD PRESSURE: 69 MMHG

## 2022-04-14 DIAGNOSIS — E11.621 DIABETIC ULCER OF LEFT HEEL ASSOCIATED WITH TYPE 2 DIABETES MELLITUS, WITH NECROSIS OF BONE (HCC): Primary | ICD-10-CM

## 2022-04-14 DIAGNOSIS — L97.424 DIABETIC ULCER OF LEFT HEEL ASSOCIATED WITH TYPE 2 DIABETES MELLITUS, WITH NECROSIS OF BONE (HCC): Primary | ICD-10-CM

## 2022-04-14 LAB
METER GLUCOSE: 129 MG/DL (ref 74–99)
METER GLUCOSE: 134 MG/DL (ref 74–99)

## 2022-04-14 PROCEDURE — G0277 HBOT, FULL BODY CHAMBER, 30M: HCPCS

## 2022-04-14 PROCEDURE — 82962 GLUCOSE BLOOD TEST: CPT

## 2022-04-14 PROCEDURE — 99183 HYPERBARIC OXYGEN THERAPY: CPT | Performed by: PHYSICIAN ASSISTANT

## 2022-04-14 NOTE — PROGRESS NOTES
[facility]  Hyperbaric Oxygen Therapy   Progress Note    NAME: Viviana Seaman  MEDICAL RECORD NUMBER:  [id number]  AGE: 57 y.o. GENDER: male  : 1960  EPISODE DATE:  2022   Subjective   HBO Treatment Number: 35 out of Total Treatments: 40  HBO Diagnosis: diabetic foot ulcer with necrosis of bone    Safety checks performed prior to treatment by [facility] staff. See doc flowsheets for documentation. Objective         Recent Labs     22  1612 22  1400   GLUMET 92 134*     Pre treatment Vital Signs       Temp: 98.2 °F (36.8 °C)     Pulse: 86     Resp: 18     BP: (!) 140/68     Post treatment Vital Signs  Temp: 96.7 °F (35.9 °C)  Pulse: 88  Resp: 16  BP: 136/72  Assessment      Physical Exam:  General Appearance:  alert and oriented to person, place and time, well-developed and well-nourished, in no acute distress  ENT:  tympanic membranes intact bilaterally, normal color, normal light reflex bilaterally  Pulmonary/Chest:  clear to auscultation bilaterally- no wheezes, rales or rhonchi, normal air movement, no respiratory distress  Cardiovascular:  regular rate and rhythm  Chamber #: 36  Treatment Start Time: 1420     Pressure Reached Time: 1430  YOLANDA : 2  Number of Air Breaks:  Treatment Status: No Air break     Decompression Time: 1600   Treatment End Time: 1608  Length of Treatment: 90 Minutes  Symptoms Noted During Treatment: None  Total Treatment Time (min): 108    Adverse Event: no    I was present on these premises and immediately available to furnish assistance & direction throughout the procedure. Plan      Viviana Seaman is a 57 y.o. male  did successfully complete today's hyperbaric oxygen treatment at [address] [place] [address]. In my clinical judgement, ongoing HBO therapy is necessary at this time, given a threat to patient function, limb or life from the current condition.       Supervision and attendance of Hyperbaric Oxygen Therapy provided. Continue HBO treatment as outlined in the treatment plan. Hyperbaric Oxygen: Lei Steel tolerated Treatment Number: 28 well today without complications.     Discharge Instructions were explained and given to Mr. Gregg Laurent by [facility] staff    Electronically signed by Blanca Perez MD

## 2022-04-14 NOTE — PROGRESS NOTES
[facility]  Hyperbaric Oxygen Therapy   Progress Note    NAME: Ari Grove  MEDICAL RECORD NUMBER:  [id number]  AGE: 57 y.o. GENDER: male  : 1960  EPISODE DATE:  2022   Subjective   HBO Treatment Number: 45 out of Total Treatments: 40  HBO Diagnosis:   Problem List Items Addressed This Visit       * (Principal) 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 2 diabetes mellitus, with necrosis of bone ([address] - Primary (Chronic)          Safety checks performed prior to treatment. See doc flowsheets for documentation. Objective         Recent Labs     04/15/22  1312 04/15/22  1621   GLUMET 148* 126*     Pre treatment Vital Signs       Temp: 97.3 °F (36.3 °C)     Pulse: 72     Resp: 16     BP: 139/68     Post treatment Vital Signs  Temp: 97.6 °F (36.4 °C)  Pulse: 72  Resp: 16  BP: (!) 144/69  Assessment      Physical Exam:  General Appearance:  alert and oriented to person, place and time, well-developed and well-nourished, in no acute distress  ENT:  tympanic membranes intact bilaterally  Pulmonary/Chest:  clear to auscultation bilaterally- no wheezes, rales or rhonchi, normal air movement, no respiratory distress  Cardiovascular:  regular rate and rhythm  Chamber #: 36  Treatment Start Time: 1422     Pressure Reached Time: 1433  YOLANDA : 2  Number of Air Breaks:  Treatment Status: No Air break     Decompression Time: 1604   Treatment End Time: 1616  Length of Treatment: 90 Minutes  Symptoms Noted During Treatment: None  Total Treatment Time (min): 114    Adverse Event: no    I was present on these premises and immediately available to furnish assistance & direction throughout the procedure. Plan      Ari Grove is a 57 y.o. male  did successfully complete today's hyperbaric oxygen treatment at [address] [place] [address].     In my clinical judgement, ongoing HBO therapy is  necessary at this time, given a threat to patient function, limb or life from the current condition. Supervision and attendance of Hyperbaric Oxygen Therapy provided. Continue HBO treatment as outlined in the treatment plan. Hyperbaric Oxygen: Pushpa Bautista tolerated Treatment Number: 45 well today without complications.     Discharge Instructions were explained and given to Mr. Hermelindo Chavarria     Electronically signed by Cihp Mercado PA-C on 4/14/2022 at 1:10 PM

## 2022-04-14 NOTE — PROGRESS NOTES
[facility]  Hyperbaric Oxygen Therapy   Progress Note    NAME: Ari Grove  MEDICAL RECORD NUMBER:  [id number]  AGE: 57 y.o. 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 See doc flowsheets for documentation. Objective         Recent Labs     22  1336 22  1612   GLUMET 163* 92     Pre treatment Vital Signs       Temp: 96.4 °F (35.8 °C)     Pulse: 80     Resp: 16     BP: (!) 150/68     Post treatment Vital Signs  Temp: 96.4 °F (35.8 °C)  Pulse: 80  Resp: 16  BP: (!) 150/68  Assessment      Physical Exam:  General Appearance:  alert and oriented to person, place and time, well-developed and well-nourished, in no acute distress  ENT:  tympanic membranes intact bilaterally, normal color, normal light reflex bilaterally  Pulmonary/Chest:  clear to auscultation bilaterally- no wheezes, rales or rhonchi, normal air movement, no respiratory distress  Cardiovascular:  regular rate and rhythm  Chamber #: 36  Treatment Start Time: 1436     Pressure Reached Time: 1443  YOLANDA : 2  Number of Air Breaks:  Treatment Status: No Air break     Decompression Time: 1615   Treatment End Time: 1622  Length of Treatment: 90 Minutes  Symptoms Noted During Treatment: None  Total Treatment Time (min): 106    Adverse Event: no    I was present on these premises and immediately available to furnish assistance & direction throughout the procedure. Plan      Ari Grove is a 57 y.o. male  did successfully complete today's hyperbaric oxygen treatment at [address] [place] [address]. In my clinical judgement, ongoing HBO therapy is necessary at this time, given a threat to patient function, limb or life from the current condition.       Supervision and attendance of Hyperbaric Oxygen Therapy provided. Continue HBO treatment as outlined in the treatment plan. Hyperbaric Oxygen: Marco Steel tolerated Treatment Number: 28 well today without complications.     Discharge Instructions were explained and given to Theresa Radha Dinora by [facility] staff    Electronically signed by Shon Solis MD

## 2022-04-15 ENCOUNTER — HOSPITAL ENCOUNTER (OUTPATIENT)
Dept: HYPERBARIC MEDICINE | Age: 62
Setting detail: THERAPIES SERIES
Discharge: HOME | End: 2022-04-15
Payer: COMMERCIAL

## 2022-04-15 VITALS
SYSTOLIC BLOOD PRESSURE: 164 MMHG | TEMPERATURE: 96.6 F | DIASTOLIC BLOOD PRESSURE: 66 MMHG | HEART RATE: 72 BPM | RESPIRATION RATE: 18 BRPM

## 2022-04-15 DIAGNOSIS — L97.424 DIABETIC ULCER OF LEFT HEEL ASSOCIATED WITH TYPE 2 DIABETES MELLITUS, WITH NECROSIS OF BONE (HCC): Primary | ICD-10-CM

## 2022-04-15 DIAGNOSIS — E11.621 DIABETIC ULCER OF LEFT HEEL ASSOCIATED WITH TYPE 2 DIABETES MELLITUS, WITH NECROSIS OF BONE (HCC): Primary | ICD-10-CM

## 2022-04-15 LAB
METER GLUCOSE: 126 MG/DL (ref 74–99)
METER GLUCOSE: 148 MG/DL (ref 74–99)

## 2022-04-15 PROCEDURE — G0277 HBOT, FULL BODY CHAMBER, 30M: HCPCS

## 2022-04-15 PROCEDURE — 99183 HYPERBARIC OXYGEN THERAPY: CPT | Performed by: NURSE PRACTITIONER

## 2022-04-15 PROCEDURE — 82962 GLUCOSE BLOOD TEST: CPT

## 2022-04-18 ENCOUNTER — HOSPITAL ENCOUNTER (OUTPATIENT)
Dept: HYPERBARIC MEDICINE | Age: 62
Setting detail: THERAPIES SERIES
Discharge: HOME | End: 2022-04-18
Payer: COMMERCIAL

## 2022-04-18 ENCOUNTER — HOSPITAL ENCOUNTER (OUTPATIENT)
Dept: WOUND CARE | Age: 62
Discharge: HOME | End: 2022-04-18
Payer: COMMERCIAL

## 2022-04-18 VITALS
RESPIRATION RATE: 18 BRPM | TEMPERATURE: 97.9 F | DIASTOLIC BLOOD PRESSURE: 70 MMHG | SYSTOLIC BLOOD PRESSURE: 130 MMHG | HEART RATE: 76 BPM

## 2022-04-18 VITALS
RESPIRATION RATE: 18 BRPM | HEIGHT: 70 IN | WEIGHT: 195 LBS | SYSTOLIC BLOOD PRESSURE: 152 MMHG | HEART RATE: 86 BPM | BODY MASS INDEX: 27.92 KG/M2 | TEMPERATURE: 98.1 F | DIASTOLIC BLOOD PRESSURE: 84 MMHG

## 2022-04-18 DIAGNOSIS — E11.621 DIABETIC ULCER OF LEFT HEEL ASSOCIATED WITH TYPE 2 DIABETES MELLITUS, WITH NECROSIS OF BONE (HCC): Primary | ICD-10-CM

## 2022-04-18 DIAGNOSIS — L97.424 DIABETIC ULCER OF LEFT HEEL ASSOCIATED WITH TYPE 2 DIABETES MELLITUS, WITH NECROSIS OF BONE (HCC): Primary | ICD-10-CM

## 2022-04-18 DIAGNOSIS — E11.628 DIABETIC FOOT INFECTION (HCC): ICD-10-CM

## 2022-04-18 DIAGNOSIS — L08.9 DIABETIC FOOT INFECTION (HCC): ICD-10-CM

## 2022-04-18 LAB
METER GLUCOSE: 116 MG/DL (ref 74–99)
METER GLUCOSE: 121 MG/DL (ref 74–99)
METER GLUCOSE: 127 MG/DL (ref 74–99)
METER GLUCOSE: 134 MG/DL (ref 74–99)
METER GLUCOSE: 164 MG/DL (ref 74–99)

## 2022-04-18 PROCEDURE — G0277 HBOT, FULL BODY CHAMBER, 30M: HCPCS

## 2022-04-18 PROCEDURE — 6370000000 HC RX 637 (ALT 250 FOR IP): Performed by: PODIATRIST

## 2022-04-18 PROCEDURE — 11042 DBRDMT SUBQ TIS 1ST 20SQCM/<: CPT

## 2022-04-18 PROCEDURE — 99183 HYPERBARIC OXYGEN THERAPY: CPT | Performed by: SURGERY

## 2022-04-18 PROCEDURE — 82962 GLUCOSE BLOOD TEST: CPT

## 2022-04-18 RX ORDER — LIDOCAINE HYDROCHLORIDE 40 MG/ML
SOLUTION TOPICAL ONCE
Status: COMPLETED | OUTPATIENT
Start: 2022-04-18 | End: 2022-04-18

## 2022-04-18 RX ORDER — GENTAMICIN SULFATE 1 MG/G
OINTMENT TOPICAL ONCE
Status: CANCELLED | OUTPATIENT
Start: 2022-04-18 | End: 2022-04-18

## 2022-04-18 RX ORDER — BETAMETHASONE DIPROPIONATE 0.05 %
OINTMENT (GRAM) TOPICAL ONCE
Status: CANCELLED | OUTPATIENT
Start: 2022-04-18 | End: 2022-04-18

## 2022-04-18 RX ORDER — CLOBETASOL PROPIONATE 0.5 MG/G
OINTMENT TOPICAL ONCE
Status: CANCELLED | OUTPATIENT
Start: 2022-04-18 | End: 2022-04-18

## 2022-04-18 RX ORDER — BACITRACIN ZINC AND POLYMYXIN B SULFATE 500; 1000 [USP'U]/G; [USP'U]/G
OINTMENT TOPICAL ONCE
Status: CANCELLED | OUTPATIENT
Start: 2022-04-18 | End: 2022-04-18

## 2022-04-18 RX ORDER — LIDOCAINE 40 MG/G
CREAM TOPICAL ONCE
Status: CANCELLED | OUTPATIENT
Start: 2022-04-18 | End: 2022-04-18

## 2022-04-18 RX ORDER — LIDOCAINE HYDROCHLORIDE 20 MG/ML
JELLY TOPICAL ONCE
Status: CANCELLED | OUTPATIENT
Start: 2022-04-18 | End: 2022-04-18

## 2022-04-18 RX ORDER — LIDOCAINE 50 MG/G
OINTMENT TOPICAL ONCE
Status: CANCELLED | OUTPATIENT
Start: 2022-04-18 | End: 2022-04-18

## 2022-04-18 RX ORDER — GINSENG 100 MG
CAPSULE ORAL ONCE
Status: CANCELLED | OUTPATIENT
Start: 2022-04-18 | End: 2022-04-18

## 2022-04-18 RX ORDER — LIDOCAINE HYDROCHLORIDE 40 MG/ML
SOLUTION TOPICAL ONCE
Status: CANCELLED | OUTPATIENT
Start: 2022-04-18 | End: 2022-04-18

## 2022-04-18 RX ORDER — BACITRACIN, NEOMYCIN, POLYMYXIN B 400; 3.5; 5 [USP'U]/G; MG/G; [USP'U]/G
OINTMENT TOPICAL ONCE
Status: CANCELLED | OUTPATIENT
Start: 2022-04-18 | End: 2022-04-18

## 2022-04-18 RX ADMIN — LIDOCAINE HYDROCHLORIDE 3 ML: 40 SOLUTION TOPICAL at 13:20

## 2022-04-18 ASSESSMENT — PAIN DESCRIPTION - ONSET: ONSET: ON-GOING

## 2022-04-18 ASSESSMENT — PAIN SCALES - GENERAL: PAINLEVEL_OUTOF10: 0

## 2022-04-18 ASSESSMENT — PAIN - FUNCTIONAL ASSESSMENT: PAIN_FUNCTIONAL_ASSESSMENT: ACTIVITIES ARE NOT PREVENTED

## 2022-04-18 ASSESSMENT — PAIN DESCRIPTION - PROGRESSION: CLINICAL_PROGRESSION: GRADUALLY IMPROVING

## 2022-04-18 NOTE — PLAN OF CARE
Problem: Wound:  Goal: Will show signs of wound healing; wound closure and no evidence of infection  Description: Will show signs of wound healing; wound closure and no evidence of infection  Outcome: Met This Shift     Problem: Blood Glucose:  Goal: Ability to maintain appropriate glucose levels will improve  Description: Ability to maintain appropriate glucose levels will improve  Outcome: Ongoing

## 2022-04-18 NOTE — PROGRESS NOTES
Alvaro Paul is a 57 y.o. male has been receiving hyperbaric oxygen treatment for management of: Indication  Indications: Lower Extremity Diabetic Wound ___(site) (LLE)  [place]afy [place] 3. Mr. Dory Beaz has completed Treatment Number: 40 out of a treatment protocol of Total Treatments: 40.    Problem List:  Patient Active Problem List   Diagnosis Code    CVA (cerebral vascular accident) ([address] I63.9    Diabetes mellitus ([address] E11.9    Diabetic foot infection ([address] E11.628, L08.9    Hypertension I10    Hemiparesis affecting left side as late effect of cerebrovascular accident [facility]) L19.090    Peripheral arterial disease ([address] I73.9    Urinary tract infection due to Proteus N39.0, B96.4    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 2 diabetes mellitus, with necrosis of bone ([address] E11.621, L97.424    Ulcer of left heel, with necrosis of muscle (HCC) L97.423    Atherosclerosis of native artery of left lower extremity with ulceration of heel (HCC) I70.244    Peripheral vascular angioplasty status Z98.62    Ulcer of left heel, with necrosis of bone ([address] L97.424       Patients ID was verified. Hyperbaric oxygen therapy plan of care, patient history, outpatient fall risk assessment, nutritional assessment and daily medications were reviewed, addressed and updated as needed. Pt is tolerating hyperbaric oxygen therapy  well without complications.          Diamante Power RN  4/18/2022  3:11 PM

## 2022-04-18 NOTE — PROGRESS NOTES
partial calcanectomy. FU 1 week     6/28/21 - Alginate and add gentamicin ointment QD. HBOT referral.  Patient hold off on partial calcanectomy. FU 1 week. Patient gave me consent (verbal) to speak with his brother Jane Wong regarding his at home arrangement. HBOT is on hold until patient is at home.     7/12/21 -  Alginate and add gentamicin ointment QD. HBOT referral.  Patient considering partial calcanectomy with flap. FU 1 week.      7/19/21 - Alginate and add gentamicin ointment QD. HBOT referral.  Patient considering partial calcanectomy with flap. FU 1 week.      8/2/21 - Alginate and add gentamicin ointment QD. HBOT referral.  Patient considering partial calcanectomy with flap. FU 1 week.      8/16/21 - Alginate and add gentamicin ointment QD. HBOT referral.  Patient considering partial calcanectomy with flap. FU 1 week.      8/23/21 - Alginate and add gentamicin ointment QD. HBOT referral.  Patient considering partial calcanectomy with flap. FU 1 week.      8/30/21 - Alginate and add gentamicin ointment QD. HBOT referral.  Patient considering partial calcanectomy with flap. FU 1 week.      9/20/21 - Alginate and add gentamicin ointment QD. HBOT referral.  Patient considering partial calcanectomy with flap. FU 1 week.      10/25/21 Alginate and add gentamicin ointment QD. HBOT referral.  Patient considering partial calcanectomy with flap. FU 1 week.      11/1/21 Alginate and add gentamicin ointment QD. HBOT referral.  Patient considering partial calcanectomy with flap. FU 1 week.      11/8/21 - Alginate and add gentamicin ointment QD. HBOT referral.  Patient considering partial calcanectomy with flap. FU 1 week.      11/15/21 - Alginate and add gentamicin ointment QD. HBOT referral.  Patient considering partial calcanectomy with flap. FU 1 week.      11/22/21 -  Alginate and add gentamicin ointment QD. HBOT referral.  Patient considering partial calcanectomy with flap. FU 1 week.    11/29/21 0 Alginate and gentamicin ointment. Possible DC home. HBOT referral.  Patient considering partial calcanectomy with flap. FU 1 week.      12/6/21 - Alginate and gentamicin ointment. Possible DC home. HBOT referral.  Patient considering partial calcanectomy with flap. FU 1 week.      12/13/21 - Alginate and gentamicin ointment. DC home. HBOT referral.  [facility] referral for wound care and PT for GAIT training and safe DME use. Patient considering partial calcanectomy with flap. FU 1 week.      12/20/21 -  Alginate and gentamicin ointment. DC home. HBOT referral.     12/27/21 - HBOT consultation complete - to start HBOT after new year. 2-14-22 patient presents today for evaluation of a left heel wound. Patient transferred here to [facility] due to the fact that he is doing HBO, for convenience due to transportation. He was prior following with Dr. Mo Del Rio. Patient overall doing well, no complaints. Tolerating dressings as well as hyperbarics. Physical exam demonstrates vascular intact. Wound appreciated posterior aspect with areas of devitalized nonviable tissue with beefy tissue noted as well. There is no purulence, odor, erythema or increase in temperature. Currently no exposed bone. 2-24-22  Patient overall doing well, no complaints. Tolerating dressings as well as hyperbarics. Physical exam demonstrates vascular intact. Wound appreciated posterior aspect with areas of devitalized nonviable tissue with beefy tissue noted as well. There is no purulence, odor, erythema or increase in temperature. 3-3-22 presents in follow-up. Relates dark area on his left great toenail as well as \"swelling\"  Left lower leg that he noticed recently. Patient denies any known trauma. Patient currently takes aspirin as well as Plavix. Physical exam demonstrates vascular intact.   Wound appreciated posterior aspect with areas of devitalized nonviable tissue with beefy tissue noted as well. There is no purulence, odor, erythema or increase in temperature. Left great toenail proximal lateral small area, subungual hematoma, stable. 2 fluctuant areas adjacent left lower leg anterior lateral aspect. There is no erythema or increase in temperature. After oral consent under sterile technique and utilizing 1% lidocaine plain, 21-gauge needle as well as 11 blade stab incision expressing approximately 20 cc of hematoma formation, this was cultured. Monitor closely if he notices any recurrence recommend [facility] for further evaluation    3-28-22 last seen March 3rd, transportation issues. Physical exam, vascular intact. Wound appreciated posterior aspect left heel with areas of devitalized nonviable tissue, increasing/seen. There is no purulence, odor, erythema or increase in temperature. Change treatment to Santyl. Patient would benefit from continued HBO.    4-4-22 Physical exam, vascular intact. Wound appreciated posterior aspect left heel with areas of devitalized nonviable tissue w/ areas of beefy tissue. There is no purulence, odor, erythema or increase in temperature. Santyl. HBO. Improved at present. 4-11-22 Wound appreciated posterior aspect left heel with areas of devitalized nonviable tissue w/ areas of beefy tissue. There is no purulence, odor, erythema or increase in temperature. Santyl. HBO. 4-18-22 Wound appreciated posterior aspect left heel with areas of devitalized nonviable tissue w/ areas of beefy tissue. There is no purulence, odor, erythema or increase in temperature. Santyl. HBO. Discussed possible graft in near future, will get updated vasc studies.      Wound/Ulcer Pain Timing/Severity: none  Quality of pain: N/A  Severity:  0 / 10   Modifying Factors: None  Associated Signs/Symptoms: none     Ulcer Identification:  Ulcer Type: arterial and diabetic  Contributing Factors: diabetes     Diabetic/Pressure/Non Pressure Ulcers onl y: PAST MEDICAL HISTORY      Diagnosis Date    Cerebral artery occlusion with cerebral infarction ([address]     Diabetes mellitus ([address]     Type 2    Diabetic foot ulcer with osteomyelitis ([address] 12/21/2021    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 2 diabetes mellitus, with necrosis of bone ([address] 12/21/2021    Hemiparesis affecting left side as late effect of cerebrovascular accident ([address]     LEFT SIDE NON DOMINANT FOLLOWING STROKE    Hypertension     Peripheral vascular angioplasty status 12/21/2021    Ulcer of left heel, with necrosis of bone ([address] 12/27/2021    Ulcer of left heel, with necrosis of muscle ([address] 12/21/2021     Past Surgical History:   Procedure Laterality Date    FINGER AMPUTATION      FOOT DEBRIDEMENT Left 2/16/2021    LEFT FOOT DEBRIDEMENT WITH BONE BIOPSY, WOUND VAC APPLICATION performed by Petros Duque DPM at [place] [address] ARTHROSCOPY      TONSILLECTOMY      TRANSESOPHAGEAL ECHOCARDIOGRAM N/A 2/22/2021    TRANSESOPHAGEAL ECHOCARDIOGRAM WITH BUBBLE STUDY performed by Monica Osorio MD at [address] N/A 1/4/2021    EGD BIOPSY performed by Higinio Gonzalez DO at [facility] reviewed. No pertinent family history. Social History     Tobacco Use    Smoking status: Former Smoker    Smokeless tobacco: Never Used   Vaping Use    Vaping Use: Never used   Substance Use Topics    Alcohol use: Not Currently     Comment: Former every day drinker for most of adult life    Drug use: No     Allergies   Allergen Reactions    Pcn [Penicillins]      Current Outpatient Medications on File Prior to Encounter   Medication Sig Dispense Refill    gentamicin (GARAMYCIN) 0.1 % cream Apply topically with each dressing change 30 g 3    traMADol (ULTRAM) 50 MG tablet Take 50 mg by mouth 2 times daily.       diphenhydrAMINE (BENADRYL ALLERGY) 25 MG capsule Take 25 mg by mouth every 6 hours as needed for Itching      gabapentin (NEURONTIN) 100 MG capsule Take 100 mg by mouth 2 times daily.  B Complex-C-E-Zn (STRESS B/ZINC) TABS Take 1 tablet by mouth daily      acetaminophen (TYLENOL) 325 MG tablet Take 650 mg by mouth every 6 hours as needed for Pain      ferrous sulfate (IRON 325) 325 (65 Fe) MG tablet Take 325 mg by mouth daily (with breakfast)      vitamin D (ERGOCALCIFEROL) 1.25 MG (13174 UT) CAPS capsule Take 1 capsule by mouth once a week 5 capsule 0    melatonin 3 MG TABS tablet Take 9 mg by mouth nightly as needed      aspirin 81 MG EC tablet Take 81 mg by mouth daily      hydroCHLOROthiazide (HYDRODIURIL) 25 MG tablet Take 25 mg by mouth daily      metFORMIN (GLUCOPHAGE) 500 MG tablet Take 1 tablet by mouth 2 times daily (with meals) 60 tablet 3    atorvastatin (LIPITOR) 40 MG tablet Take 1 tablet by mouth nightly 30 tablet 3    clopidogrel (PLAVIX) 75 MG tablet Take 1 tablet by mouth daily 30 tablet 3     No current facility-administered medications on file prior to encounter. REVIEW OF SYSTEMS See HPI    Objective:    BP (!) 152/84   Pulse 86   Temp 98.1 °F (36.7 °C) (Temporal)   Resp 18   Ht 5' 10\" (1.778 m)   Wt 195 lb (88.5 kg)   BMI 27.98 kg/m²   Wt Readings from Last 3 Encounters:   04/18/22 195 lb (88.5 kg)   04/08/22 195 lb (88.5 kg)   04/04/22 195 lb (88.5 kg)     PHYSICAL EXAM  CONSTITUTIONAL:   Awake, alert, cooperative   EYES:  lids and lashes normal   ENT: external ears and nose without lesions   NECK:  supple, symmetrical, trachea midline   SKIN:  Open wound     Assessment:     DIABETIC ULCER.     Pre Debridement Measurements:  Are located in the [facility]  Documentation Flow Sheet  Post Debridement Measurements:  Wound/Ulcer Descriptions are Pre Debridement except measurements:     Wound 12/26/20 Arm Left (Active)   Number of days: 477       Wound 02/15/21 Heel Left (Active)   Number of days: 427       Wound 02/15/21 Heel Right (Active)   Number of days: 427       Wound 03/08/21 Heel Left #1 left heel aquired 12/1/20 (Active)   Wound Image   03/25/22 1358   Wound Etiology Diabetic 02/14/22 1344   Dressing Status New dressing applied 04/11/22 1338   Wound Cleansed Cleansed with saline 04/11/22 1338   Dressing/Treatment Alginate;Dry dressing; Pharmaceutical agent (see MAR) 04/11/22 1338   Offloading for Diabetic Foot Ulcers Post op shoe 04/11/22 1338   Wound Length (cm) 3.9 cm 04/18/22 1315   Wound Width (cm) 3.1 cm 04/18/22 1315   Wound Depth (cm) 0.4 cm 04/18/22 1315   Wound Surface Area (cm^2) 12.09 cm^2 04/18/22 1315   Change in Wound Size % (l*w) 0 04/18/22 1315   Wound Volume (cm^3) 4.836 cm^3 04/18/22 1315   Wound Healing % 43 04/18/22 1315   Post-Procedure Length (cm) 4.1 cm 04/11/22 1335   Post-Procedure Width (cm) 3.2 cm 04/11/22 1335   Post-Procedure Depth (cm) 0.6 cm 04/11/22 1335   Post-Procedure Surface Area (cm^2) 13.12 cm^2 04/11/22 1335   Post-Procedure Volume (cm^3) 7.872 cm^3 04/11/22 1335   Undermining Starts ___ O'Clock 6 04/18/22 1315   Undermining Ends___ O'Clock 12 04/18/22 1315   Undermining Maxium Distance (cm) [email or web address] 04/18/22 1315   Wound Assessment Fibrin;Pale granulation tissue 04/18/22 1315   Drainage Amount Moderate 04/18/22 1315   Drainage Description Serosanguinous 04/18/22 1315   Odor None 04/18/22 1315   Adrianne-wound Assessment Maceration 04/18/22 1315   Number of days: 406       Wound 03/08/21 Heel Right #2 rt heel aquired 12/1/20 (Active)   Wound Image   04/05/21 0929   Dressing Status New dressing applied 03/18/21 1352   Wound Cleansed Cleansed with saline 04/01/21 1536   Dressing/Treatment Alginate;Collagen;Silicone border 67/67/50 1536   Offloading for Diabetic Foot Ulcers Post op shoe 04/01/21 1536   Wound Length (cm) 0 cm 04/05/21 0929   Wound Width (cm) 0 cm 04/05/21 0929   Wound Depth (cm) 0 cm 04/05/21 0929   Wound Surface Area (cm^2) 0 cm^2 04/05/21 0929   Change in Wound Size % (l*w) 100 04/05/21 0929   Wound Volume (cm^3) 0 cm^3 04/05/21 0929   Wound Healing % 100 04/05/21 0929   Post-Procedure Length (cm) 0 cm 04/05/21 1011   Post-Procedure Width (cm) 0 cm 04/05/21 1011   Post-Procedure Depth (cm) 0 cm 04/05/21 1011   Post-Procedure Surface Area (cm^2) 0 cm^2 04/05/21 1011   Post-Procedure Volume (cm^3) 0 cm^3 04/05/21 1011   Wound Assessment Fibrin 04/01/21 1357   Drainage Amount None 04/01/21 1357   Drainage Description Yellow 03/22/21 0911   Odor None 04/01/21 1357   Adrianne-wound Assessment Maceration 04/01/21 1357   Number of days: 406     Incision 02/19/21 Groin Right (Active)   Number of days: 423       Procedure Note  Indications:  Based on my examination of this patient's wound(s)/ulcer(s) today, debridement is required to promote healing and evaluate the wound base. Performed by: Brit Randhawa DPM    Consent obtained:  Yes    Time out taken:  Yes    Pain Control: Anesthetic  Anesthetic: 4% Lidocaine Liquid Topical     Debridement:Excisional Debridement    Using curette the wound(s)/ulcer(s) was/were sharply debrided down through and including the removal of subcutaneous tissue. Devitalized Tissue Debrided:  fibrin, biofilm, slough and necrotic/eschar to stimulate bleeding to promote healing, post debridement good bleeding base and wound edges noted    Wound/Ulcer #: 1    Percent of Wound/Ulcer Debrided: 100%    Total Surface Area Debrided: 12.09 sq cm     Estimated Blood Loss:  Minimal  Hemostasis Achieved:  by pressure    Procedural Pain:  0  / 10   Post Procedural Pain:  0 / 10     Response to treatment:  Well tolerated by patient. Plan:   Treatment Note please see attached Discharge Instructions    Written patient dismissal instructions given to patient and signed by patient or POA.          Discharge Instructions       Visit Discharge/Physician Orders    Assessment of pain at discharge: moderate     Anesthetic used: 4% liquid lidocaine solution      Discharge to:      Left via:ambulance     Accompanied by: self     ECF[facility]: [facility]      Dressing Orders:  Cleanse left heel ulcer with normal saline solution apply santyl ointment and cover with plain alginate and dry dressing and change every day or as needed for drainage.  .      Treatment Orders:    Continue antibiotics per Dr Delmy Cabrera and Cipro for suppression.  Wear prevalon boots or heel ayo while in bed.          May be partial weight bearing up to 50% on left with surgical shoe.     HBO continue     Consider surgical debridement of infected bone in heel with skin graft.     Start PT evaluate and treat as indicated three times a week. Vascular studies ordered and scheduled at the wound care center    Start workup for graft        [address] [place] [address],[address] followup visit: ______________ 1 weeks Dr Hawley_________________  (Please note your next appointment above and if you are unable to keep, kindly give a 24 hour notice. Thank you.)    Physician signature:__________________________      If you experience any of the following, please call the [facility] during business hours:    * Increase in Pain  * Temperature over 101  * Increase in drainage from your wound  * Drainage with a foul odor  * Bleeding  * Increase in swelling  * Need for compression bandage changes due to slippage, breakthrough drainage. If you need medical attention outside of the business hours of the [facility] please contact your PCP or go to the nearest emergency room.         Electronically signed by Francois Manuel DPM on 4/18/2022 at 1:33 PM

## 2022-04-19 ENCOUNTER — APPOINTMENT (OUTPATIENT)
Dept: HYPERBARIC MEDICINE | Age: 62
End: 2022-04-19
Payer: COMMERCIAL

## 2022-04-19 NOTE — PROGRESS NOTES
attendance of Hyperbaric Oxygen Therapy provided. Continue HBO treatment as outlined in the treatment plan. Hyperbaric Oxygen: May Stai tolerated Treatment Number: 44 well today without complications.     Discharge Instructions were explained and given to Mr. Tanesha Maldonado     Electronically signed by ALBERT Chatterjee CNP on 4/15/2022 at 5:22 PM

## 2022-04-21 ENCOUNTER — HOSPITAL ENCOUNTER (OUTPATIENT)
Dept: HYPERBARIC MEDICINE | Age: 62
Setting detail: THERAPIES SERIES
Discharge: HOME | End: 2022-04-21
Payer: COMMERCIAL

## 2022-04-21 VITALS
HEART RATE: 80 BPM | TEMPERATURE: 97 F | SYSTOLIC BLOOD PRESSURE: 138 MMHG | RESPIRATION RATE: 16 BRPM | DIASTOLIC BLOOD PRESSURE: 66 MMHG

## 2022-04-21 DIAGNOSIS — E11.621 DIABETIC ULCER OF LEFT HEEL ASSOCIATED WITH TYPE 2 DIABETES MELLITUS, WITH NECROSIS OF BONE (HCC): Primary | ICD-10-CM

## 2022-04-21 DIAGNOSIS — L97.424 DIABETIC ULCER OF LEFT HEEL ASSOCIATED WITH TYPE 2 DIABETES MELLITUS, WITH NECROSIS OF BONE (HCC): Primary | ICD-10-CM

## 2022-04-21 LAB
METER GLUCOSE: 107 MG/DL (ref 74–99)
METER GLUCOSE: 182 MG/DL (ref 74–99)

## 2022-04-21 PROCEDURE — 99183 HYPERBARIC OXYGEN THERAPY: CPT | Performed by: SURGERY

## 2022-04-21 PROCEDURE — G0277 HBOT, FULL BODY CHAMBER, 30M: HCPCS

## 2022-04-21 PROCEDURE — 82962 GLUCOSE BLOOD TEST: CPT

## 2022-04-22 ENCOUNTER — HOSPITAL ENCOUNTER (OUTPATIENT)
Dept: HYPERBARIC MEDICINE | Age: 62
Setting detail: THERAPIES SERIES
Discharge: HOME | End: 2022-04-22
Payer: COMMERCIAL

## 2022-04-22 VITALS
TEMPERATURE: 97.1 F | HEART RATE: 72 BPM | DIASTOLIC BLOOD PRESSURE: 62 MMHG | SYSTOLIC BLOOD PRESSURE: 146 MMHG | RESPIRATION RATE: 16 BRPM

## 2022-04-22 DIAGNOSIS — E11.621 DIABETIC ULCER OF LEFT HEEL ASSOCIATED WITH TYPE 2 DIABETES MELLITUS, WITH NECROSIS OF BONE (HCC): Primary | ICD-10-CM

## 2022-04-22 DIAGNOSIS — L97.424 DIABETIC ULCER OF LEFT HEEL ASSOCIATED WITH TYPE 2 DIABETES MELLITUS, WITH NECROSIS OF BONE (HCC): Primary | ICD-10-CM

## 2022-04-22 LAB
METER GLUCOSE: 112 MG/DL (ref 74–99)
METER GLUCOSE: 181 MG/DL (ref 74–99)

## 2022-04-22 PROCEDURE — G0277 HBOT, FULL BODY CHAMBER, 30M: HCPCS

## 2022-04-22 PROCEDURE — 99183 HYPERBARIC OXYGEN THERAPY: CPT | Performed by: SURGERY

## 2022-04-22 PROCEDURE — 82962 GLUCOSE BLOOD TEST: CPT

## 2022-04-22 NOTE — PROGRESS NOTES
[facility]  Hyperbaric Oxygen Therapy   Progress Note    NAME: Zuly Tatum  MEDICAL RECORD NUMBER:  [id number]  AGE: 57 y.o. GENDER: male  : 1960  EPISODE DATE:  2022   Subjective   HBO Treatment Number: 41 out of Total Treatments: 40  HBO Diagnosis:   Problem List Items Addressed This Visit     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 2 diabetes mellitus, with necrosis of bone ([address] - Primary (Chronic)    Relevant Orders    Hypoglycemial protocol    POCT glucose    Care order/instruction    Notify physician (specify)    Hyperbaric Oxygen Therapy    Care order/instruction        Safety checks performed prior to treatment. See doc flowsheets for documentation. Objective         Recent Labs     22  1252 22  1525   GLUMET 181* 112*     Pre treatment Vital Signs       Temp: 97.8 °F (36.6 °C)     Pulse: 80     Resp: 16     BP: (!) 120/54     Post treatment Vital Signs  Temp: 97.1 °F (36.2 °C)  Pulse: 72  Resp: 16  BP: (!) 146/62  Assessment      Physical Exam:  General Appearance:  alert and oriented to person, place and time, well-developed and well-nourished, in no acute distress  ENT:  tympanic membranes intact bilaterally  Pulmonary/Chest:  clear to auscultation bilaterally- no wheezes, rales or rhonchi, normal air movement, no respiratory distress  Cardiovascular:  regular rate and rhythm  Chamber #: 36  Treatment Start Time: 1337     Pressure Reached Time: 1345  YOLANDA : 2  Number of Air Breaks:  Treatment Status: No Air break     Decompression Time: 1514   Treatment End Time: 1523  Length of Treatment: 90 Minutes  Symptoms Noted During Treatment: None  Total Treatment Time (min): 106    Adverse Event: no    I was present on these premises and immediately available to furnish assistance & direction throughout the procedure.    Plan      Zuly Tatum is a 57 y.o. male  did successfully complete today's hyperbaric oxygen treatment at [address] [facility] [address] [facility]. In my clinical judgement, ongoing HBO therapy is  necessary at this time, given a threat to patient function, limb or life from the current condition. Supervision and attendance of Hyperbaric Oxygen Therapy provided. Continue HBO treatment as outlined in the treatment plan. Hyperbaric Oxygen: Bang Taylor tolerated Treatment Number: 18 well today without complications.     Discharge Instructions were explained and given to Mr. Smiley Reyes     Electronically signed by Flaquito Olivares MD on 4/22/2022 at 4:45 PM

## 2022-04-22 NOTE — PROGRESS NOTES
[facility]  Hyperbaric Oxygen Therapy   Progress Note    NAME: Patricia Cabral  MEDICAL RECORD NUMBER:  [id number]  AGE: 57 y.o. GENDER: male  : 1960  EPISODE DATE:  2022   Subjective   HBO Treatment Number: 40 out of Total Treatments: 40  HBO Diagnosis:   Problem List Items Addressed This Visit     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 2 diabetes mellitus, with necrosis of bone ([address] - Primary (Chronic)    Relevant Orders    Hypoglycemial protocol    POCT glucose    Notify physician (specify)    Hyperbaric Oxygen Therapy    Care order/instruction        Safety checks performed prior to treatment. See doc flowsheets for documentation. Objective         Recent Labs     22  1331 22  1557   GLUMET 182* 107*     Pre treatment Vital Signs       Temp: 98.1 °F (36.7 °C)     Pulse: 90     Resp: 16     BP: (!) 153/77     Post treatment Vital Signs  Temp: 97 °F (36.1 °C)  Pulse: 80  Resp: 16  BP: 138/66  Assessment      Physical Exam:  General Appearance:  alert and oriented to person, place and time, well-developed and well-nourished, in no acute distress  ENT:  tympanic membranes intact bilaterally  Pulmonary/Chest:  clear to auscultation bilaterally- no wheezes, rales or rhonchi, normal air movement, no respiratory distress  Cardiovascular:  regular rate and rhythm  Chamber #: 36  Treatment Start Time: 0824     Pressure Reached Time: 1419  YOLANDA : 2  Number of Air Breaks:  Treatment Status: No Air break     Decompression Time: 1545   Treatment End Time: 3951  Length of Treatment: 90 Minutes  Symptoms Noted During Treatment: None  Total Treatment Time (min): 111    Adverse Event: yes    I was present on these premises and immediately available to furnish assistance & direction throughout the procedure.    Plan      Patricia Cabral is a 57 y.o. male  did successfully complete today's hyperbaric oxygen treatment at [facility]. In my clinical judgement, ongoing HBO therapy is  necessary at this time, given a threat to patient function, limb or life from the current condition. Supervision and attendance of Hyperbaric Oxygen Therapy provided. Continue HBO treatment as outlined in the treatment plan. Hyperbaric Oxygen: Guillermo Steel tolerated Treatment Number: 36 well today without complications.     Discharge Instructions were explained and given to Mr. Trevon Vega     Electronically signed by Sid Murray MD on 4/21/2022 at 8:56 PM

## 2022-04-25 ENCOUNTER — HOSPITAL ENCOUNTER (OUTPATIENT)
Dept: HYPERBARIC MEDICINE | Age: 62
Setting detail: THERAPIES SERIES
Discharge: HOME | End: 2022-04-25
Payer: COMMERCIAL

## 2022-04-25 ENCOUNTER — HOSPITAL ENCOUNTER (OUTPATIENT)
Dept: WOUND CARE | Age: 62
Discharge: HOME | End: 2022-04-25

## 2022-04-25 NOTE — PROGRESS NOTES
Patient unable to make it to HBO appt today due to transportation issues. Patient plans to make appt tomorrow.

## 2022-04-26 ENCOUNTER — HOSPITAL ENCOUNTER (OUTPATIENT)
Dept: HYPERBARIC MEDICINE | Age: 62
Setting detail: THERAPIES SERIES
Discharge: HOME | End: 2022-04-26
Payer: COMMERCIAL

## 2022-04-26 VITALS
SYSTOLIC BLOOD PRESSURE: 140 MMHG | RESPIRATION RATE: 16 BRPM | DIASTOLIC BLOOD PRESSURE: 64 MMHG | HEART RATE: 84 BPM | TEMPERATURE: 97.5 F

## 2022-04-26 DIAGNOSIS — L97.424 DIABETIC ULCER OF LEFT HEEL ASSOCIATED WITH TYPE 2 DIABETES MELLITUS, WITH NECROSIS OF BONE (HCC): Primary | ICD-10-CM

## 2022-04-26 DIAGNOSIS — E11.621 DIABETIC ULCER OF LEFT HEEL ASSOCIATED WITH TYPE 2 DIABETES MELLITUS, WITH NECROSIS OF BONE (HCC): Primary | ICD-10-CM

## 2022-04-26 LAB
METER GLUCOSE: 130 MG/DL (ref 74–99)
METER GLUCOSE: 151 MG/DL (ref 74–99)

## 2022-04-26 PROCEDURE — 82962 GLUCOSE BLOOD TEST: CPT

## 2022-04-26 PROCEDURE — 99183 HYPERBARIC OXYGEN THERAPY: CPT | Performed by: SURGERY

## 2022-04-26 PROCEDURE — G0277 HBOT, FULL BODY CHAMBER, 30M: HCPCS

## 2022-04-26 NOTE — PROGRESS NOTES
[facility]  Hyperbaric Oxygen Therapy   Progress Note    NAME: Bean Horn  MEDICAL RECORD NUMBER:  [id number]  AGE: 57 y.o. 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 See doc flowsheets for documentation. Objective         Recent Labs     22  1333 22  1557   GLUMET 151* 130*     Pre treatment Vital Signs       Temp: 97.4 °F (36.3 °C)     Pulse: 76     Resp: 16     BP: 128/64     Post treatment Vital Signs  Temp: 97.5 °F (36.4 °C)  Pulse: 84  Resp: 16  BP: (!) 140/64  Assessment      Physical Exam:  General Appearance:  alert and oriented to person, place and time, well-developed and well-nourished, in no acute distress  ENT:  tympanic membranes intact bilaterally  Pulmonary/Chest:  clear to auscultation bilaterally- no wheezes, rales or rhonchi, normal air movement, no respiratory distress  Cardiovascular:  regular rate and rhythm  Chamber #: 38  Treatment Start Time: 1401     Pressure Reached Time: 1413  YOLANDA : 2  Number of Air Breaks:  Treatment Status: No Air break     Decompression Time: 1543   Treatment End Time: 1557  Length of Treatment: 90 Minutes  Symptoms Noted During Treatment: None  Total Treatment Time (min): 116    Adverse Event: no    I was present on these premises and immediately available to furnish assistance & direction throughout the procedure.    Plan      Bean Horn is a 57 y.o. male  did successfully complete today's hyperbaric oxygen treatment at [address] [facility] [address] [facility]. In my clinical judgement, ongoing HBO therapy is  necessary at this time, given a threat to patient function, limb or life from the current condition. Supervision and attendance of Hyperbaric Oxygen Therapy provided. Continue HBO treatment as outlined in the treatment plan. Hyperbaric Oxygen: Pushpa Batuista tolerated Treatment Number: 05 well today without complications.     Discharge Instructions were explained and given to Mr. Casarez Deon     Electronically signed by Yamilet Dunn MD on 4/26/2022 at 4:24 PM

## 2022-04-27 ENCOUNTER — HOSPITAL ENCOUNTER (OUTPATIENT)
Dept: HYPERBARIC MEDICINE | Age: 62
Setting detail: THERAPIES SERIES
Discharge: HOME | End: 2022-04-27
Payer: COMMERCIAL

## 2022-04-27 ENCOUNTER — TELEPHONE (OUTPATIENT)
Dept: PRIMARY CARE CLINIC | Age: 62
End: 2022-04-27

## 2022-04-27 VITALS
SYSTOLIC BLOOD PRESSURE: 122 MMHG | TEMPERATURE: 97.6 F | RESPIRATION RATE: 16 BRPM | DIASTOLIC BLOOD PRESSURE: 70 MMHG | HEART RATE: 88 BPM

## 2022-04-27 DIAGNOSIS — L97.424 DIABETIC ULCER OF LEFT HEEL ASSOCIATED WITH TYPE 2 DIABETES MELLITUS, WITH NECROSIS OF BONE (HCC): Primary | ICD-10-CM

## 2022-04-27 DIAGNOSIS — E11.621 DIABETIC ULCER OF LEFT HEEL ASSOCIATED WITH TYPE 2 DIABETES MELLITUS, WITH NECROSIS OF BONE (HCC): Primary | ICD-10-CM

## 2022-04-27 LAB
METER GLUCOSE: 114 MG/DL (ref 74–99)
METER GLUCOSE: 212 MG/DL (ref 74–99)

## 2022-04-27 PROCEDURE — G0277 HBOT, FULL BODY CHAMBER, 30M: HCPCS

## 2022-04-27 PROCEDURE — 99183 HYPERBARIC OXYGEN THERAPY: CPT | Performed by: SURGERY

## 2022-04-27 PROCEDURE — 82962 GLUCOSE BLOOD TEST: CPT

## 2022-04-27 RX ORDER — CYCLOBENZAPRINE HCL 5 MG
5 TABLET ORAL NIGHTLY PRN
Qty: 30 TABLET | Refills: 0 | Status: SHIPPED
Start: 2022-04-27 | End: 2022-05-03 | Stop reason: SDUPTHER

## 2022-04-27 NOTE — TELEPHONE ENCOUNTER
Home healthcare called and states Jerome's muscle spasms in his legs are getting worse with the pain and also making it difficult to do wound care, can you please call him in a muscle relaxer? He was previously on Cyclobenzaprine.

## 2022-04-28 ENCOUNTER — HOSPITAL ENCOUNTER (OUTPATIENT)
Dept: HYPERBARIC MEDICINE | Age: 62
Setting detail: THERAPIES SERIES
Discharge: HOME | End: 2022-04-28
Payer: COMMERCIAL

## 2022-04-28 ENCOUNTER — HOSPITAL ENCOUNTER (OUTPATIENT)
Dept: WOUND CARE | Age: 62
Discharge: HOME | End: 2022-04-28

## 2022-04-29 ENCOUNTER — HOSPITAL ENCOUNTER (OUTPATIENT)
Dept: HYPERBARIC MEDICINE | Age: 62
Setting detail: THERAPIES SERIES
Discharge: HOME | End: 2022-04-29
Payer: COMMERCIAL

## 2022-04-29 VITALS
DIASTOLIC BLOOD PRESSURE: 62 MMHG | TEMPERATURE: 97 F | SYSTOLIC BLOOD PRESSURE: 126 MMHG | RESPIRATION RATE: 16 BRPM | HEART RATE: 76 BPM

## 2022-04-29 DIAGNOSIS — E11.621 DIABETIC ULCER OF LEFT HEEL ASSOCIATED WITH TYPE 2 DIABETES MELLITUS, WITH NECROSIS OF BONE (HCC): Primary | ICD-10-CM

## 2022-04-29 DIAGNOSIS — L97.424 DIABETIC ULCER OF LEFT HEEL ASSOCIATED WITH TYPE 2 DIABETES MELLITUS, WITH NECROSIS OF BONE (HCC): Primary | ICD-10-CM

## 2022-04-29 LAB
METER GLUCOSE: 133 MG/DL (ref 74–99)
METER GLUCOSE: 137 MG/DL (ref 74–99)

## 2022-04-29 PROCEDURE — G0277 HBOT, FULL BODY CHAMBER, 30M: HCPCS

## 2022-04-29 PROCEDURE — 99183 HYPERBARIC OXYGEN THERAPY: CPT | Performed by: SURGERY

## 2022-04-29 PROCEDURE — 82962 GLUCOSE BLOOD TEST: CPT

## 2022-04-29 NOTE — PROGRESS NOTES
[facility]  Hyperbaric Oxygen Therapy   Progress Note    NAME: Pushpa Bautista  MEDICAL RECORD NUMBER:  [id number]  AGE: 57 y.o. GENDER: male  : 1960  EPISODE DATE:  2022   Subjective   HBO Treatment Number: 40 out of Total Treatments: 40  HBO Diagnosis: diabetic foot ulcer with necrosis of bone    Safety checks performed prior to treatment by [facility] staff. See doc flowsheets for documentation. Objective         Recent Labs     22  1341 22  1614   GLUMET 212* 114*     Pre treatment Vital Signs       Temp: 98.2 °F (36.8 °C)     Pulse: 78     Resp: 20     BP: 139/76     Post treatment Vital Signs  Temp: 97.9 °F (36.6 °C)  Pulse: 76  Resp: 18  BP: 130/70  Assessment      Physical Exam:  General Appearance:  alert and oriented to person, place and time, well-developed and well-nourished, in no acute distress  ENT:  tympanic membranes intact bilaterally, normal color, normal light reflex bilaterally  Pulmonary/Chest:  clear to auscultation bilaterally- no wheezes, rales or rhonchi, normal air movement, no respiratory distress  Cardiovascular:  regular rate and rhythm  Chamber #: 36  Treatment Start Time: 1448     Pressure Reached Time: 1459  YOLANDA : 2  Number of Air Breaks:  Treatment Status: No Air break     Decompression Time: 1627   Treatment End Time: 8905  Length of Treatment: 90 Minutes  Symptoms Noted During Treatment: None  Total Treatment Time (min): 109    Adverse Event: no    I was present on these premises and immediately available to furnish assistance & direction throughout the procedure. Plan      Pushpa Bautista is a 57 y.o. male  did successfully complete today's hyperbaric oxygen treatment at [address] [place] [address]. In my clinical judgement, ongoing HBO therapy is necessary at this time, given a threat to patient function, limb or life from the current condition.       Supervision and attendance of Hyperbaric Oxygen Therapy provided. Continue HBO treatment as outlined in the treatment plan. Hyperbaric Oxygen: Demetrio Steel tolerated Treatment Number: 36 well today without complications.     Discharge Instructions were explained and given to Mr. Noman Monteiro by [facility] staff    Electronically signed by Anna Mckeon MD

## 2022-04-30 NOTE — PROGRESS NOTES
[facility]  Hyperbaric Oxygen Therapy   Progress Note    NAME: Melody Little  MEDICAL RECORD NUMBER:  [id number]  AGE: 57 y.o. 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 See doc flowsheets for documentation. Objective         Recent Labs     22  1338 22  1608   GLUMET 133* 137*     Pre treatment Vital Signs       Temp: 97.4 °F (36.3 °C)     Pulse: 80     Resp: 16     BP: 132/66     Post treatment Vital Signs  Temp: 97 °F (36.1 °C)  Pulse: 76  Resp: 16  BP: 126/62  Assessment      Physical Exam:  General Appearance:  alert and oriented to person, place and time, well-developed and well-nourished, in no acute distress  ENT:  tympanic membranes intact bilaterally  Pulmonary/Chest:  clear to auscultation bilaterally- no wheezes, rales or rhonchi, normal air movement, no respiratory distress  Cardiovascular:  regular rate and rhythm  Chamber #: 36  Treatment Start Time: 1415     Pressure Reached Time: 1425  YOLANDA : 2  Number of Air Breaks:  Treatment Status: No Air break     Decompression Time: 1600   Treatment End Time: 9782  Length of Treatment: 90 Minutes  Symptoms Noted During Treatment: None  Total Treatment Time (min): 109    Adverse Event: no    I was present on these premises and immediately available to furnish assistance & direction throughout the procedure. Plan      Melody Little is a 57 y.o. male  did successfully complete today's hyperbaric oxygen treatment at [address] [place] [address].     In my clinical judgement, ongoing HBO therapy is  necessary at this time, given a threat to patient function, limb or life from the current condition. Supervision and attendance of Hyperbaric Oxygen Therapy provided. Continue HBO treatment as outlined in the treatment plan. Hyperbaric Oxygen: Mitesh Nogueira tolerated Treatment Number: 40 well today without complications.     Discharge Instructions were explained and given to  Polly Scott     Electronically signed by Mi Maldonado MD on 4/29/2022 at 5:41 AM

## 2022-05-02 ENCOUNTER — HOSPITAL ENCOUNTER (OUTPATIENT)
Dept: WOUND CARE | Age: 62
Discharge: HOME | End: 2022-05-02

## 2022-05-02 ENCOUNTER — HOSPITAL ENCOUNTER (OUTPATIENT)
Dept: HYPERBARIC MEDICINE | Age: 62
Setting detail: THERAPIES SERIES
Discharge: HOME | End: 2022-05-02
Payer: COMMERCIAL

## 2022-05-03 ENCOUNTER — HOSPITAL ENCOUNTER (OUTPATIENT)
Dept: HYPERBARIC MEDICINE | Age: 62
Setting detail: THERAPIES SERIES
Discharge: HOME | End: 2022-05-03
Payer: COMMERCIAL

## 2022-05-03 VITALS
RESPIRATION RATE: 16 BRPM | DIASTOLIC BLOOD PRESSURE: 68 MMHG | HEART RATE: 80 BPM | SYSTOLIC BLOOD PRESSURE: 136 MMHG | TEMPERATURE: 97.8 F

## 2022-05-03 DIAGNOSIS — E11.621 DIABETIC ULCER OF LEFT HEEL ASSOCIATED WITH TYPE 2 DIABETES MELLITUS, WITH NECROSIS OF BONE (HCC): Primary | ICD-10-CM

## 2022-05-03 DIAGNOSIS — L97.424 DIABETIC ULCER OF LEFT HEEL ASSOCIATED WITH TYPE 2 DIABETES MELLITUS, WITH NECROSIS OF BONE (HCC): Primary | ICD-10-CM

## 2022-05-03 LAB
METER GLUCOSE: 135 MG/DL (ref 74–99)
METER GLUCOSE: 165 MG/DL (ref 74–99)

## 2022-05-03 PROCEDURE — G0277 HBOT, FULL BODY CHAMBER, 30M: HCPCS

## 2022-05-03 PROCEDURE — 82962 GLUCOSE BLOOD TEST: CPT

## 2022-05-03 PROCEDURE — 99183 HYPERBARIC OXYGEN THERAPY: CPT | Performed by: SURGERY

## 2022-05-03 RX ORDER — CYCLOBENZAPRINE HCL 5 MG
5 TABLET ORAL NIGHTLY PRN
Qty: 30 TABLET | Refills: 2 | Status: SHIPPED | OUTPATIENT
Start: 2022-05-03 | End: 2022-06-02

## 2022-05-03 NOTE — PROGRESS NOTES
[facility]  Hyperbaric Oxygen Therapy   Progress Note    NAME: Pushpa Bautista  MEDICAL RECORD NUMBER:  [id number]  AGE: 57 y.o. 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 See doc flowsheets for documentation. Objective         Recent Labs     22  1316 22  1558   GLUMET 165* 135*     Pre treatment Vital Signs       Temp: 97.6 °F (36.4 °C)     Pulse: 84     Resp: 16     BP: 133/70     Post treatment Vital Signs  Temp: 97.8 °F (36.6 °C)  Pulse: 80  Resp: 16  BP: 136/68  Assessment      Physical Exam:  General Appearance:  alert and oriented to person, place and time, well-developed and well-nourished, in no acute distress  ENT:  tympanic membranes intact bilaterally  Pulmonary/Chest:  clear to auscultation bilaterally- no wheezes, rales or rhonchi, normal air movement, no respiratory distress  Cardiovascular:  regular rate and rhythm  Chamber #: 36  Treatment Start Time: 1406     Pressure Reached Time: 1416  YOLANDA : 2  Number of Air Breaks:  Treatment Status: No Air break     Decompression Time: 1546   Treatment End Time: 1556  Length of Treatment: 90 Minutes  Symptoms Noted During Treatment: None  Total Treatment Time (min): 110    Adverse Event: no    I was present on these premises and immediately available to furnish assistance & direction throughout the procedure.    Plan      Pushpa Bautista is a 57 y.o. male  did successfully complete today's hyperbaric oxygen treatment at [facility] [address]. In my clinical judgement, ongoing HBO therapy is  necessary at this time, given a threat to patient function, limb or life from the current condition. Supervision and attendance of Hyperbaric Oxygen Therapy provided. Continue HBO treatment as outlined in the treatment plan. Hyperbaric Oxygen: Jean Pierre January Damián tolerated Treatment Number: 39 well today without complications.     Discharge Instructions were explained and given to Theresa Veazie     Electronically signed by Jj Villasenor MD on 5/3/2022 at 5:53 PM

## 2022-05-04 ENCOUNTER — HOSPITAL ENCOUNTER (OUTPATIENT)
Dept: HYPERBARIC MEDICINE | Age: 62
Setting detail: THERAPIES SERIES
Discharge: HOME | End: 2022-05-04
Payer: COMMERCIAL

## 2022-05-04 VITALS
RESPIRATION RATE: 16 BRPM | SYSTOLIC BLOOD PRESSURE: 130 MMHG | TEMPERATURE: 97.2 F | DIASTOLIC BLOOD PRESSURE: 62 MMHG | HEART RATE: 72 BPM

## 2022-05-04 DIAGNOSIS — L97.424 DIABETIC ULCER OF LEFT HEEL ASSOCIATED WITH TYPE 2 DIABETES MELLITUS, WITH NECROSIS OF BONE (HCC): Primary | ICD-10-CM

## 2022-05-04 DIAGNOSIS — E11.621 DIABETIC ULCER OF LEFT HEEL ASSOCIATED WITH TYPE 2 DIABETES MELLITUS, WITH NECROSIS OF BONE (HCC): Primary | ICD-10-CM

## 2022-05-04 LAB
METER GLUCOSE: 105 MG/DL (ref 74–99)
METER GLUCOSE: 188 MG/DL (ref 74–99)

## 2022-05-04 PROCEDURE — 99183 HYPERBARIC OXYGEN THERAPY: CPT | Performed by: SURGERY

## 2022-05-04 PROCEDURE — G0277 HBOT, FULL BODY CHAMBER, 30M: HCPCS

## 2022-05-04 PROCEDURE — 82962 GLUCOSE BLOOD TEST: CPT

## 2022-05-05 ENCOUNTER — HOSPITAL ENCOUNTER (OUTPATIENT)
Dept: HYPERBARIC MEDICINE | Age: 62
Setting detail: THERAPIES SERIES
Discharge: HOME | End: 2022-05-05
Payer: COMMERCIAL

## 2022-05-05 VITALS
SYSTOLIC BLOOD PRESSURE: 122 MMHG | RESPIRATION RATE: 18 BRPM | TEMPERATURE: 97.8 F | HEART RATE: 78 BPM | DIASTOLIC BLOOD PRESSURE: 80 MMHG

## 2022-05-05 DIAGNOSIS — E11.621 DIABETIC ULCER OF LEFT HEEL ASSOCIATED WITH TYPE 2 DIABETES MELLITUS, WITH NECROSIS OF BONE (HCC): Primary | ICD-10-CM

## 2022-05-05 DIAGNOSIS — L97.424 DIABETIC ULCER OF LEFT HEEL ASSOCIATED WITH TYPE 2 DIABETES MELLITUS, WITH NECROSIS OF BONE (HCC): Primary | ICD-10-CM

## 2022-05-05 LAB
METER GLUCOSE: 186 MG/DL (ref 74–99)
METER GLUCOSE: 95 MG/DL (ref 74–99)

## 2022-05-05 PROCEDURE — 82962 GLUCOSE BLOOD TEST: CPT

## 2022-05-05 PROCEDURE — 99183 HYPERBARIC OXYGEN THERAPY: CPT | Performed by: PHYSICIAN ASSISTANT

## 2022-05-05 PROCEDURE — G0277 HBOT, FULL BODY CHAMBER, 30M: HCPCS

## 2022-05-05 NOTE — PROGRESS NOTES
[facility]  Hyperbaric Oxygen Therapy   Progress Note    NAME: Noemi Panda  MEDICAL RECORD NUMBER:  [id number]  AGE: 57 y.o. GENDER: male  : 1960  EPISODE DATE:  2022   Subjective   HBO Treatment Number: 52 out of Total Treatments: 40  HBO Diagnosis:   Problem List Items Addressed This Visit        Endocrine    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 2 diabetes mellitus, with necrosis of bone ([address] - Primary (Chronic)    Relevant Orders    POCT glucose        Safety checks performed prior to treatment. See doc flowsheets for documentation. Objective         Recent Labs     22  1311 22  1641   GLUMET 142* 133*     Pre treatment Vital Signs       Temp: 97.7 °F (36.5 °C)     Pulse: 78     Resp: 18     BP: 128/64     Post treatment Vital Signs  Temp: 97.8 °F (36.6 °C)  Pulse: 78  Resp: 18  BP: 122/80  Assessment      Physical Exam:  General Appearance:  alert and oriented to person, place and time, well-developed and well-nourished, in no acute distress  ENT:  tympanic membranes intact bilaterally  Pulmonary/Chest:  clear to auscultation bilaterally- no wheezes, rales or rhonchi, normal air movement, no respiratory distress  Cardiovascular:  regular rate and rhythm  LLE: 1+ fem, biphasic pop, biphasic PT, strong biphasic DP  Chamber #: 36  Treatment Start Time: 1336     Pressure Reached Time: 1346  YOLANDA : 2.5  Number of Air Breaks:  Treatment Status: No Air break     Decompression Time: 1516   Treatment End Time: 1525  Length of Treatment: 90 Minutes  Symptoms Noted During Treatment: None  Total Treatment Time (min): 109    Adverse Event: no    I was present on these premises and immediately available to furnish assistance & direction throughout the procedure. Plan      Noemi Panda is a 57 y.o. male  did successfully complete today's hyperbaric oxygen treatment at [address] [place] [address].     In my clinical judgement, ongoing HBO therapy is  necessary at this time, given a threat to patient function, limb or life from the current condition. Supervision and attendance of Hyperbaric Oxygen Therapy provided. Continue HBO treatment as outlined in the treatment plan. Hyperbaric Oxygen: Ari Grove tolerated Treatment Number: 52 well today without complications.     Discharge Instructions were explained and given to Mr. Edy Peace     Electronically signed by Sharlene Yi PA-C on 5/5/2022 at 11:46 AM

## 2022-05-06 ENCOUNTER — HOSPITAL ENCOUNTER (OUTPATIENT)
Dept: HYPERBARIC MEDICINE | Age: 62
Setting detail: THERAPIES SERIES
Discharge: HOME | End: 2022-05-06
Payer: COMMERCIAL

## 2022-05-06 VITALS
RESPIRATION RATE: 18 BRPM | SYSTOLIC BLOOD PRESSURE: 160 MMHG | HEART RATE: 72 BPM | TEMPERATURE: 97.3 F | DIASTOLIC BLOOD PRESSURE: 66 MMHG

## 2022-05-06 DIAGNOSIS — E11.621 DIABETIC ULCER OF LEFT HEEL ASSOCIATED WITH TYPE 2 DIABETES MELLITUS, WITH NECROSIS OF BONE (HCC): Primary | ICD-10-CM

## 2022-05-06 DIAGNOSIS — L97.424 DIABETIC ULCER OF LEFT HEEL ASSOCIATED WITH TYPE 2 DIABETES MELLITUS, WITH NECROSIS OF BONE (HCC): Primary | ICD-10-CM

## 2022-05-06 LAB
METER GLUCOSE: 133 MG/DL (ref 74–99)
METER GLUCOSE: 142 MG/DL (ref 74–99)

## 2022-05-06 PROCEDURE — 82962 GLUCOSE BLOOD TEST: CPT

## 2022-05-06 PROCEDURE — G0277 HBOT, FULL BODY CHAMBER, 30M: HCPCS

## 2022-05-06 PROCEDURE — 99183 HYPERBARIC OXYGEN THERAPY: CPT | Performed by: SURGERY

## 2022-05-06 NOTE — PROGRESS NOTES
[facility]  Hyperbaric Oxygen Therapy   Progress Note    NAME: Ari Grove  MEDICAL RECORD NUMBER:  [id number]  AGE: 57 y.o. GENDER: male  : 1960  EPISODE DATE:  2022   Subjective   HBO Treatment Number: 48 out of Total Treatments: 40  HBO Diagnosis:   Problem List Items Addressed This Visit     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 2 diabetes mellitus, with necrosis of bone ([address] - Primary (Chronic)    Relevant Orders    Hypoglycemial protocol    POCT glucose    Care order/instruction    Notify physician (specify)    Hyperbaric Oxygen Therapy    Care order/instruction        Safety checks performed prior to treatment. See doc flowsheets for documentation. Objective         Recent Labs     22  1311 22  1641   GLUMET 142* 133*     Pre treatment Vital Signs       Temp: 97.1 °F (36.2 °C)     Pulse: 72     Resp: 14     BP: (!) 146/78     Post treatment Vital Signs  Temp: 97.3 °F (36.3 °C)  Pulse: 72  Resp: 18  BP: (!) 160/66  Assessment      Physical Exam:  General Appearance:  alert and oriented to person, place and time, well-developed and well-nourished, in no acute distress  ENT:  tympanic membranes intact bilaterally  Pulmonary/Chest:  clear to auscultation bilaterally- no wheezes, rales or rhonchi, normal air movement, no respiratory distress  Cardiovascular:  regular rate and rhythm  Chamber #: 36  Treatment Start Time: 1434     Pressure Reached Time: 6457  YOLANDA : 2  Number of Air Breaks:  Treatment Status: No Air break     Decompression Time: 1613   Treatment End Time: 1620  Length of Treatment: 90 Minutes  Symptoms Noted During Treatment: None  Total Treatment Time (min): 106    Adverse Event: no    I was present on these premises and immediately available to furnish assistance & direction throughout the procedure.    Plan      Ari Grove is a 57 y.o. male  did successfully complete today's hyperbaric oxygen treatment at [facility] [address] [facility]. In my clinical judgement, ongoing HBO therapy is  necessary at this time, given a threat to patient function, limb or life from the current condition. Supervision and attendance of Hyperbaric Oxygen Therapy provided. Continue HBO treatment as outlined in the treatment plan. Hyperbaric Oxygen: Beto Keisha Damián tolerated Treatment Number: 50 well today without complications.     Discharge Instructions were explained and given to Mr. Lamb Carmela     Electronically signed by Bisi Faustin MD on 5/6/2022 at 5:19 PM

## 2022-05-09 ENCOUNTER — HOSPITAL ENCOUNTER (OUTPATIENT)
Dept: WOUND CARE | Age: 62
Discharge: HOME | End: 2022-05-09
Payer: COMMERCIAL

## 2022-05-09 ENCOUNTER — HOSPITAL ENCOUNTER (OUTPATIENT)
Dept: HYPERBARIC MEDICINE | Age: 62
Setting detail: THERAPIES SERIES
Discharge: HOME | End: 2022-05-09
Payer: COMMERCIAL

## 2022-05-09 VITALS
DIASTOLIC BLOOD PRESSURE: 64 MMHG | HEART RATE: 92 BPM | BODY MASS INDEX: 27.92 KG/M2 | SYSTOLIC BLOOD PRESSURE: 128 MMHG | HEIGHT: 70 IN | WEIGHT: 195 LBS | TEMPERATURE: 97.5 F

## 2022-05-09 VITALS
TEMPERATURE: 97.6 F | RESPIRATION RATE: 18 BRPM | DIASTOLIC BLOOD PRESSURE: 70 MMHG | SYSTOLIC BLOOD PRESSURE: 130 MMHG | HEART RATE: 76 BPM

## 2022-05-09 DIAGNOSIS — L08.9 DIABETIC FOOT INFECTION (HCC): ICD-10-CM

## 2022-05-09 DIAGNOSIS — E11.621 DIABETIC ULCER OF LEFT HEEL ASSOCIATED WITH TYPE 2 DIABETES MELLITUS, WITH NECROSIS OF BONE (HCC): Primary | ICD-10-CM

## 2022-05-09 DIAGNOSIS — L97.424 ULCER OF LEFT HEEL, WITH NECROSIS OF BONE (HCC): ICD-10-CM

## 2022-05-09 DIAGNOSIS — L97.423 ULCER OF LEFT HEEL, WITH NECROSIS OF MUSCLE (HCC): ICD-10-CM

## 2022-05-09 DIAGNOSIS — L97.424 DIABETIC ULCER OF LEFT HEEL ASSOCIATED WITH TYPE 2 DIABETES MELLITUS, WITH NECROSIS OF BONE (HCC): Primary | ICD-10-CM

## 2022-05-09 DIAGNOSIS — E11.628 DIABETIC FOOT INFECTION (HCC): ICD-10-CM

## 2022-05-09 DIAGNOSIS — I70.244 ATHEROSCLEROSIS OF NATIVE ARTERY OF LEFT LOWER EXTREMITY WITH ULCERATION OF HEEL (HCC): ICD-10-CM

## 2022-05-09 LAB
METER GLUCOSE: 117 MG/DL (ref 74–99)
METER GLUCOSE: 129 MG/DL (ref 74–99)
METER GLUCOSE: 138 MG/DL (ref 74–99)

## 2022-05-09 PROCEDURE — 11042 DBRDMT SUBQ TIS 1ST 20SQCM/<: CPT | Performed by: SURGERY

## 2022-05-09 PROCEDURE — 99183 HYPERBARIC OXYGEN THERAPY: CPT | Performed by: SURGERY

## 2022-05-09 PROCEDURE — 99213 OFFICE O/P EST LOW 20 MIN: CPT | Performed by: SURGERY

## 2022-05-09 PROCEDURE — 11042 DBRDMT SUBQ TIS 1ST 20SQCM/<: CPT

## 2022-05-09 PROCEDURE — 6370000000 HC RX 637 (ALT 250 FOR IP): Performed by: SURGERY

## 2022-05-09 PROCEDURE — 82962 GLUCOSE BLOOD TEST: CPT

## 2022-05-09 PROCEDURE — G0277 HBOT, FULL BODY CHAMBER, 30M: HCPCS

## 2022-05-09 RX ORDER — BETAMETHASONE DIPROPIONATE 0.05 %
OINTMENT (GRAM) TOPICAL ONCE
Status: CANCELLED | OUTPATIENT
Start: 2022-05-09 | End: 2022-05-09

## 2022-05-09 RX ORDER — LIDOCAINE HYDROCHLORIDE 40 MG/ML
SOLUTION TOPICAL ONCE
Status: COMPLETED | OUTPATIENT
Start: 2022-05-09 | End: 2022-05-09

## 2022-05-09 RX ORDER — LIDOCAINE HYDROCHLORIDE 40 MG/ML
SOLUTION TOPICAL ONCE
Status: CANCELLED | OUTPATIENT
Start: 2022-05-09 | End: 2022-05-09

## 2022-05-09 RX ORDER — GINSENG 100 MG
CAPSULE ORAL ONCE
Status: CANCELLED | OUTPATIENT
Start: 2022-05-09 | End: 2022-05-09

## 2022-05-09 RX ORDER — LIDOCAINE HYDROCHLORIDE 20 MG/ML
JELLY TOPICAL ONCE
Status: CANCELLED | OUTPATIENT
Start: 2022-05-09 | End: 2022-05-09

## 2022-05-09 RX ORDER — GENTAMICIN SULFATE 1 MG/G
OINTMENT TOPICAL ONCE
Status: CANCELLED | OUTPATIENT
Start: 2022-05-09 | End: 2022-05-09

## 2022-05-09 RX ORDER — LIDOCAINE 50 MG/G
OINTMENT TOPICAL ONCE
Status: CANCELLED | OUTPATIENT
Start: 2022-05-09 | End: 2022-05-09

## 2022-05-09 RX ORDER — BACITRACIN, NEOMYCIN, POLYMYXIN B 400; 3.5; 5 [USP'U]/G; MG/G; [USP'U]/G
OINTMENT TOPICAL ONCE
Status: CANCELLED | OUTPATIENT
Start: 2022-05-09 | End: 2022-05-09

## 2022-05-09 RX ORDER — LIDOCAINE 40 MG/G
CREAM TOPICAL ONCE
Status: CANCELLED | OUTPATIENT
Start: 2022-05-09 | End: 2022-05-09

## 2022-05-09 RX ORDER — BACITRACIN ZINC AND POLYMYXIN B SULFATE 500; 1000 [USP'U]/G; [USP'U]/G
OINTMENT TOPICAL ONCE
Status: CANCELLED | OUTPATIENT
Start: 2022-05-09 | End: 2022-05-09

## 2022-05-09 RX ORDER — CLOBETASOL PROPIONATE 0.5 MG/G
OINTMENT TOPICAL ONCE
Status: CANCELLED | OUTPATIENT
Start: 2022-05-09 | End: 2022-05-09

## 2022-05-09 RX ADMIN — LIDOCAINE HYDROCHLORIDE 10 ML: 40 SOLUTION TOPICAL at 13:21

## 2022-05-09 ASSESSMENT — PAIN SCALES - GENERAL: PAINLEVEL_OUTOF10: 1

## 2022-05-09 ASSESSMENT — PAIN DESCRIPTION - LOCATION: LOCATION: FOOT

## 2022-05-09 ASSESSMENT — PAIN DESCRIPTION - PAIN TYPE: TYPE: CHRONIC PAIN

## 2022-05-09 ASSESSMENT — PAIN - FUNCTIONAL ASSESSMENT: PAIN_FUNCTIONAL_ASSESSMENT: ACTIVITIES ARE NOT PREVENTED

## 2022-05-09 ASSESSMENT — PAIN DESCRIPTION - FREQUENCY: FREQUENCY: INTERMITTENT

## 2022-05-09 ASSESSMENT — PAIN DESCRIPTION - DESCRIPTORS: DESCRIPTORS: SORE

## 2022-05-09 ASSESSMENT — PAIN DESCRIPTION - ONSET: ONSET: ON-GOING

## 2022-05-09 NOTE — PLAN OF CARE
Problem: Chronic Conditions and Co-morbidities  Goal: Patient's chronic conditions and co-morbidity symptoms are monitored and maintained or improved  Outcome: Progressing     Problem: Chronic Conditions and Co-morbidities  Goal: Patient's chronic conditions and co-morbidity symptoms are monitored and maintained or improved  Outcome: Progressing     Problem: Pain  Goal: Verbalizes/displays adequate comfort level or baseline comfort level  Outcome: Progressing     Problem: Wound:  Goal: Will show signs of wound healing; wound closure and no evidence of infection  Description: Will show signs of wound healing; wound closure and no evidence of infection  Outcome: Progressing     Problem: Blood Glucose:  Goal: Ability to maintain appropriate glucose levels will improve  Description: Ability to maintain appropriate glucose levels will improve  Outcome: Progressing     Problem: Pain:  Goal: Pain level will decrease  Description: Pain level will decrease  Outcome: Progressing  Goal: Control of acute pain  Description: Control of acute pain  Outcome: Progressing  Goal: Control of chronic pain  Description: Control of chronic pain  Outcome: Progressing

## 2022-05-09 NOTE — PROGRESS NOTES
Wound Healing Center Followup Visit Note    Referring Physician : Froilan Mai MD  [address] [facility] RECORD NUMBER:  [id number]  AGE: 57 y.o. GENDER: male  : 1960  EPISODE DATE:  2022    Subjective:     Chief Complaint   Patient presents with    Wound Check     LEFT FOOT      HISTORY of PRESENT ILLNESS HPI   Melody Little is a 57 y.o. male who presents today in regards to follow up evaluation and treatment of wound/ulcer. That patient's past medical, family and social hx were reviewed and changes were made if present. History of Wound Context:  The patient has had a wound of bilateral heels which was first noted approximately over a month ago. This has been treated with surgical debridement. On their initial visit to the wound healing center, 21 ,  the patient has noted that the wound has not been improving. The patient has had similar previous wounds in the past.       Pt is not on abx at time of initial visit.     3/22/21 - Wound VAC MWF continue to left heel. Aquacel Ag to right heel. Debrided toenails 1-5 in thickness and length. FU 1 week. IV Abx per Dr. Amy Bello.     21 - Wound VAC MWF to left heel. Cultures obtained. FU 1 week after visit with Dr. Amy Bello. PO Abx.  21 - DC wound VAC to left heel. Alginate to wound. FU 1 week  21 - Alginate and gentamicin ointment QOD. Partial WB to heel at 50% to foot with surgical shoe and walker.     5/3/21 - Alginate and gentamicin ointment QOD. MRI left heel     5/10/21 - Alginate and gentamicin ointment QOD. MRI left heel pending.     21 - Alginate and gentamicin ointment QOD. MRI reviewed confirming OM. Discussed HBOT referral and partial calcanectomy. He opts for HBOT consultation.     21 - Alginate and add gentamicin ointment QD. HBOT referral.  Patient hold off on partial calcanectomy. FU 1 week     21 - Alginate and add gentamicin ointment QD.   HBOT referral.  Patient hold off on partial calcanectomy. FU 1 week     6/28/21 - Alginate and add gentamicin ointment QD. HBOT referral.  Patient hold off on partial calcanectomy. FU 1 week. Patient gave me consent (verbal) to speak with his brother Gilberto Crain regarding his at home arrangement. HBOT is on hold until patient is at home.     7/12/21 -  Alginate and add gentamicin ointment QD. HBOT referral.  Patient considering partial calcanectomy with flap. FU 1 week.      7/19/21 - Alginate and add gentamicin ointment QD. HBOT referral.  Patient considering partial calcanectomy with flap. FU 1 week.      8/2/21 - Alginate and add gentamicin ointment QD. HBOT referral.  Patient considering partial calcanectomy with flap. FU 1 week.      8/16/21 - Alginate and add gentamicin ointment QD. HBOT referral.  Patient considering partial calcanectomy with flap. FU 1 week.      8/23/21 - Alginate and add gentamicin ointment QD. HBOT referral.  Patient considering partial calcanectomy with flap. FU 1 week.      8/30/21 - Alginate and add gentamicin ointment QD. HBOT referral.  Patient considering partial calcanectomy with flap. FU 1 week.      9/20/21 - Alginate and add gentamicin ointment QD. HBOT referral.  Patient considering partial calcanectomy with flap. FU 1 week.      10/25/21 Alginate and add gentamicin ointment QD. HBOT referral.  Patient considering partial calcanectomy with flap. FU 1 week.      11/1/21 Alginate and add gentamicin ointment QD. HBOT referral.  Patient considering partial calcanectomy with flap. FU 1 week.      11/8/21 - Alginate and add gentamicin ointment QD. HBOT referral.  Patient considering partial calcanectomy with flap. FU 1 week.      11/15/21 - Alginate and add gentamicin ointment QD. HBOT referral.  Patient considering partial calcanectomy with flap. FU 1 week.      11/22/21 -  Alginate and add gentamicin ointment QD. HBOT referral.  Patient considering partial calcanectomy with flap. FU 1 week.    11/29/21 0 Alginate and gentamicin ointment. Possible DC home. HBOT referral.  Patient considering partial calcanectomy with flap. FU 1 week.      12/6/21 - Alginate and gentamicin ointment. Possible DC home. HBOT referral.  Patient considering partial calcanectomy with flap. FU 1 week.      12/13/21 - Alginate and gentamicin ointment. DC home. HBOT referral.  [facility]  referral for wound care and PT for GAIT training and safe DME use. Patient considering partial calcanectomy with flap. FU 1 week.      12/20/21 -  Alginate and gentamicin ointment. DC home. HBOT referral.     12/27/21 - HBOT consultation complete - to start HBOT after new year. 2-14-22 patient presents today for evaluation of a left heel wound. Patient transferred here to [facility] wound care center [place] due to the fact that he is doing HBO, for convenience due to transportation. He was prior following with Dr. Alyson Membreno. Patient overall doing well, no complaints. Tolerating dressings as well as hyperbarics. Physical exam demonstrates vascular intact. Wound appreciated posterior aspect with areas of devitalized nonviable tissue with beefy tissue noted as well. There is no purulence, odor, erythema or increase in temperature. Currently no exposed bone. 2-24-22  Patient overall doing well, no complaints. Tolerating dressings as well as hyperbarics. Physical exam demonstrates vascular intact. Wound appreciated posterior aspect with areas of devitalized nonviable tissue with beefy tissue noted as well. There is no purulence, odor, erythema or increase in temperature. 3-3-22 presents in follow-up. Relates dark area on his left great toenail as well as \"swelling\"  Left lower leg that he noticed recently. Patient denies any known trauma. Patient currently takes aspirin as well as Plavix. Physical exam demonstrates vascular intact.   Wound appreciated posterior aspect with areas of devitalized nonviable tissue with beefy tissue noted as well. There is no purulence, odor, erythema or increase in temperature. Left great toenail proximal lateral small area, subungual hematoma, stable. 2 fluctuant areas adjacent left lower leg anterior lateral aspect. There is no erythema or increase in temperature. After oral consent under sterile technique and utilizing 1% lidocaine plain, 21-gauge needle as well as 11 blade stab incision expressing approximately 20 cc of hematoma formation, this was cultured. Monitor closely if he notices any recurrence recommend Kaylan Arana  for further evaluation    3-28-22 last seen March 3rd, transportation issues. Physical exam, vascular intact. Wound appreciated posterior aspect left heel with areas of devitalized nonviable tissue, increasing/seen. There is no purulence, odor, erythema or increase in temperature. Change treatment to Santyl. Patient would benefit from continued HBO.    4-4-22 Physical exam, vascular intact. Wound appreciated posterior aspect left heel with areas of devitalized nonviable tissue w/ areas of beefy tissue. There is no purulence, odor, erythema or increase in temperature. Santyl. HBO. Improved at present. 4-11-22 Wound appreciated posterior aspect left heel with areas of devitalized nonviable tissue w/ areas of beefy tissue. There is no purulence, odor, erythema or increase in temperature. Santyl. HBO. 4-18-22 Wound appreciated posterior aspect left heel with areas of devitalized nonviable tissue w/ areas of beefy tissue. There is no purulence, odor, erythema or increase in temperature. Santyl. HBO. Discussed possible graft in near future, will get updated vasc studies.     5/9/2022  · Patient did not come last 2 weeks and also did not go for hyperbaric, last week, because of transportation problems  · Patient tells me at one time his podiatrist Grisel Ji informed him that he may consider skin grafting of the wound  · On today's examination, I can literally feel the bone, but looks fairly clean  · Patient is trying to continue offloading the heel ulcer, and also continuing the hyperbaric oxygen therapy treatments  · The last lower extremity artery Doppler study done in 2021 revealed almost triphasic right ankle Doppler tracings and biphasic left ankle Doppler tracings with adequate flow to the heel and foot for potential tissue healing  · Patient was rescheduled for repeat proximal artery Doppler study by his podiatrist, not done yet I do not see it in the epic  · Inform the patient that last week I discussed with Dr. Casey King regarding continuing the hyperbaric oxygen therapy, possibility of skin grafting  · Discussed with patient and nursing staff, please schedule appointment for the patient to see Dr. Casey King for his input pending return to have his podiatrist back to the wound care center for further management  ·  we will make additional recommendations once the lower extremity artery Doppler studies completed  · All his questions were answered  ·      Wound/Ulcer Pain Timing/Severity: none  Quality of pain: N/A  Severity:  0 / 10   Modifying Factors: None  Associated Signs/Symptoms: none     Ulcer Identification:  Ulcer Type: arterial and diabetic  Contributing Factors: diabetes     Diabetic/Pressure/Non Pressure Ulcers onl y:        PAST MEDICAL HISTORY      Diagnosis Date    Cerebral artery occlusion with cerebral infarction ([address]     Diabetes mellitus ([address]     Type 2    Diabetic foot ulcer with osteomyelitis ([address] 12/21/2021    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 2 diabetes mellitus, with necrosis of bone ([address] 12/21/2021    Hemiparesis affecting left side as late effect of cerebrovascular accident ([address]     LEFT SIDE NON DOMINANT FOLLOWING STROKE    Hypertension     Peripheral vascular angioplasty status 12/21/2021    Ulcer of left heel, with necrosis of bone ([address] 12/27/2021    Ulcer of left heel, with necrosis of muscle ([address] 12/21/2021     Past Surgical History:   Procedure Laterality Date    FINGER AMPUTATION      FOOT DEBRIDEMENT Left 2/16/2021    LEFT FOOT DEBRIDEMENT WITH BONE BIOPSY, WOUND VAC APPLICATION performed by Ankit Willams DPM at [place] [address] ARTHROSCOPY      TONSILLECTOMY      TRANSESOPHAGEAL ECHOCARDIOGRAM N/A 2/22/2021    TRANSESOPHAGEAL ECHOCARDIOGRAM WITH BUBBLE STUDY performed by Maegan Chakraborty MD at [address] N/A 1/4/2021    EGD BIOPSY performed by Olinda Espitia DO at [facility] reviewed. No pertinent family history. Social History     Tobacco Use    Smoking status: Former Smoker    Smokeless tobacco: Never Used   Vaping Use    Vaping Use: Never used   Substance Use Topics    Alcohol use: Not Currently     Comment: Former every day drinker for most of adult life    Drug use: No     Allergies   Allergen Reactions    Pcn [Penicillins]      Current Outpatient Medications on File Prior to Encounter   Medication Sig Dispense Refill    cyclobenzaprine (FLEXERIL) 5 MG tablet Take 1 tablet by mouth nightly as needed for Muscle spasms 30 tablet 2    gentamicin (GARAMYCIN) 0.1 % cream Apply topically with each dressing change 30 g 3    traMADol (ULTRAM) 50 MG tablet Take 50 mg by mouth 2 times daily.  diphenhydrAMINE (BENADRYL ALLERGY) 25 MG capsule Take 25 mg by mouth every 6 hours as needed for Itching      gabapentin (NEURONTIN) 100 MG capsule Take 100 mg by mouth 2 times daily.        B Complex-C-E-Zn (STRESS B/ZINC) TABS Take 1 tablet by mouth daily      acetaminophen (TYLENOL) 325 MG tablet Take 650 mg by mouth every 6 hours as needed for Pain      ferrous sulfate (IRON 325) 325 (65 Fe) MG tablet Take 325 mg by mouth daily (with breakfast)      vitamin D (ERGOCALCIFEROL) 1.25 MG (16060 UT) CAPS capsule Take 1 capsule by mouth once a week 5 capsule 0    melatonin 3 MG TABS tablet Take 9 mg by mouth nightly as needed  aspirin 81 MG EC tablet Take 81 mg by mouth daily      hydroCHLOROthiazide (HYDRODIURIL) 25 MG tablet Take 25 mg by mouth daily      metFORMIN (GLUCOPHAGE) 500 MG tablet Take 1 tablet by mouth 2 times daily (with meals) 60 tablet 3    atorvastatin (LIPITOR) 40 MG tablet Take 1 tablet by mouth nightly 30 tablet 3    clopidogrel (PLAVIX) 75 MG tablet Take 1 tablet by mouth daily 30 tablet 3     No current facility-administered medications on file prior to encounter. REVIEW OF SYSTEMS See HPI    Objective:    /64   Pulse 92   Temp 97.5 °F (36.4 °C) (Tympanic)   Ht 5' 10\" (1.778 m)   Wt 195 lb (88.5 kg)   BMI 27.98 kg/m²   Wt Readings from Last 3 Encounters:   05/09/22 195 lb (88.5 kg)   04/18/22 195 lb (88.5 kg)   04/08/22 195 lb (88.5 kg)     PHYSICAL EXAM  CONSTITUTIONAL:   Awake, alert, cooperative   EYES:  lids and lashes normal   ENT: external ears and nose without lesions   NECK:  supple, symmetrical, trachea midline   SKIN:  Open wound     Assessment:     DIABETIC ULCER.     Pre Debridement Measurements:  Are located in the [place]  Documentation Flow Sheet  Post Debridement Measurements:  Wound/Ulcer Descriptions are Pre Debridement except measurements:     Wound 12/26/20 Arm Left (Active)   Number of days: 498       Wound 02/15/21 Heel Left (Active)   Number of days: 448       Wound 02/15/21 Heel Right (Active)   Number of days: 448       Wound 03/08/21 Heel Left #1 left heel aquired 12/1/20 (Active)   Wound Image   05/09/22 1314   Wound Etiology Diabetic 02/14/22 1344   Dressing Status New dressing applied 04/18/22 1334   Wound Cleansed Cleansed with saline 04/18/22 1334   Dressing/Treatment Alginate;Dry dressing 04/18/22 1334   Offloading for Diabetic Foot Ulcers Post op shoe 04/18/22 1334   Wound Length (cm) 4.3 cm 05/09/22 1314   Wound Width (cm) 3.2 cm 05/09/22 1314   Wound Depth (cm) 0.5 cm 05/09/22 1314   Wound Surface Area (cm^2) 13.76 cm^2 05/09/22 1314   Change in Wound Size % (l*w) -13.81 05/09/22 1314   Wound Volume (cm^3) 6.88 cm^3 05/09/22 1314   Wound Healing % 19 05/09/22 1314   Post-Procedure Length (cm) 4.4 cm 05/09/22 1351   Post-Procedure Width (cm) 3.3 cm 05/09/22 1351   Post-Procedure Depth (cm) 0.5 cm 05/09/22 1351   Post-Procedure Surface Area (cm^2) 14.52 cm^2 05/09/22 1351   Post-Procedure Volume (cm^3) 7.26 cm^3 05/09/22 1351   Undermining Starts ___ O'Clock 6 04/18/22 1315   Undermining Ends___ O'Clock 12 04/18/22 1315   Undermining Maxium Distance (cm) Erma@Cylande 04/18/22 1315   Wound Assessment Fibrin;Pale granulation tissue 05/09/22 1314   Drainage Amount Moderate 05/09/22 1314   Drainage Description Serosanguinous; Yellow 05/09/22 1314   Odor None 05/09/22 1314   Adrianne-wound Assessment Maceration 05/09/22 1314   Number of days: 427       Wound 03/08/21 Heel Right #2 rt heel aquired 12/1/20 (Active)   Wound Image   04/05/21 0929   Dressing Status New dressing applied 03/18/21 1352   Wound Cleansed Cleansed with saline 04/01/21 1536   Dressing/Treatment Alginate;Collagen;Silicone border 35/29/89 1536   Offloading for Diabetic Foot Ulcers Post op shoe 04/01/21 1536   Wound Length (cm) 0 cm 04/05/21 0929   Wound Width (cm) 0 cm 04/05/21 0929   Wound Depth (cm) 0 cm 04/05/21 0929   Wound Surface Area (cm^2) 0 cm^2 04/05/21 0929   Change in Wound Size % (l*w) 100 04/05/21 0929   Wound Volume (cm^3) 0 cm^3 04/05/21 0929   Wound Healing % 100 04/05/21 0929   Post-Procedure Length (cm) 0 cm 04/05/21 1011   Post-Procedure Width (cm) 0 cm 04/05/21 1011   Post-Procedure Depth (cm) 0 cm 04/05/21 1011   Post-Procedure Surface Area (cm^2) 0 cm^2 04/05/21 1011   Post-Procedure Volume (cm^3) 0 cm^3 04/05/21 1011   Wound Assessment Fibrin 04/01/21 1357   Drainage Amount None 04/01/21 1357   Drainage Description Yellow 03/22/21 0911   Odor None 04/01/21 1357   Adrianne-wound Assessment Maceration 04/01/21 1357   Number of days: 427     Incision 02/19/21 Groin Right (Active)   Number of days: 444       Procedure Note  Indications:  Based on my examination of this patient's wound(s)/ulcer(s) today, debridement is required to promote healing and evaluate the wound base. Performed by: Bisi Faustin MD    Consent obtained:  Yes    Time out taken:  Yes    Pain Control: Anesthetic  Anesthetic: 4% Lidocaine Liquid Topical     Debridement:Excisional Debridement    Using curette the wound(s)/ulcer(s) was/were sharply debrided down through and including the removal of subcutaneous tissue. Devitalized Tissue Debrided:  fibrin, biofilm, slough and necrotic/eschar to stimulate bleeding to promote healing, post debridement good bleeding base and wound edges noted    Wound/Ulcer #: 1    Percent of Wound/Ulcer Debrided: 100%    Total Surface Area Debrided: 12 sq cm     Estimated Blood Loss:  Minimal  Hemostasis Achieved:  by pressure    Procedural Pain:  0  / 10   Post Procedural Pain:  0 / 10     Response to treatment:  Well tolerated by patient. Plan:   Treatment Note please see attached Discharge Instructions    Written patient dismissal instructions given to patient and signed by patient or POA.          Discharge Instructions       Visit Discharge/Physician Orders     Assessment of pain at discharge: moderate     Anesthetic used: 4% liquid lidocaine solution      Discharge to:      Left via:ambulance     Accompanied by: self     ECF/HHA: [facility]      Dressing Orders:  Cleanse left heel ulcer with normal saline solution apply santyl ointment and cover with plain alginate and dry dressing and change every day or as needed for drainage.  .      Treatment Orders:   Wear prevalon boots or heel ayo while in bed.          May be partial weight bearing up to 50% on left with surgical shoe.     HBO continue     Consider surgical debridement of infected bone in heel with skin graft.     Start PT evaluate and treat as indicated three times a week.     Vascular studies completed 4/27/22 reviewed in clinic today     Start workup for graft        [address] [facility] followup visit: ______________ 1 week Dr MENDOZA______________  (Please note your next appointment above and if you are unable to keep, kindly give a 24 hour notice. Thank you.)     Physician signature:__________________________        If you experience any of the following, please call the [facility] during business hours:     * Increase in Pain  * Temperature over 101  * Increase in drainage from your wound  * Drainage with a foul odor  * Bleeding  * Increase in swelling  * Need for compression bandage changes due to slippage, breakthrough drainage.     If you need medical attention outside of the business hours of the [facility] please contact your PCP or go to the nearest emergency room.         Electronically signed by Mi Maldonado MD on 5/9/2022 at 1:57 PM

## 2022-05-09 NOTE — PROGRESS NOTES
Spoke with Jose Ang from provide a ride over the phone at this time.  Transportation scheduled for the following [facility] appointments:  5/12/22 1300, confirmation # [id number]  5/18/22 1300, confirmation # [id number]

## 2022-05-10 ENCOUNTER — HOSPITAL ENCOUNTER (OUTPATIENT)
Dept: HYPERBARIC MEDICINE | Age: 62
Setting detail: THERAPIES SERIES
Discharge: HOME | End: 2022-05-10
Payer: COMMERCIAL

## 2022-05-10 VITALS
DIASTOLIC BLOOD PRESSURE: 74 MMHG | SYSTOLIC BLOOD PRESSURE: 132 MMHG | RESPIRATION RATE: 18 BRPM | HEART RATE: 71 BPM | TEMPERATURE: 97.7 F

## 2022-05-10 DIAGNOSIS — L97.424 DIABETIC ULCER OF LEFT HEEL ASSOCIATED WITH TYPE 2 DIABETES MELLITUS, WITH NECROSIS OF BONE (HCC): Primary | ICD-10-CM

## 2022-05-10 DIAGNOSIS — E11.621 DIABETIC ULCER OF LEFT HEEL ASSOCIATED WITH TYPE 2 DIABETES MELLITUS, WITH NECROSIS OF BONE (HCC): Primary | ICD-10-CM

## 2022-05-10 LAB
METER GLUCOSE: 237 MG/DL (ref 74–99)
METER GLUCOSE: 96 MG/DL (ref 74–99)

## 2022-05-10 PROCEDURE — G0277 HBOT, FULL BODY CHAMBER, 30M: HCPCS

## 2022-05-10 PROCEDURE — 82962 GLUCOSE BLOOD TEST: CPT

## 2022-05-10 PROCEDURE — 99183 HYPERBARIC OXYGEN THERAPY: CPT | Performed by: SURGERY

## 2022-05-10 NOTE — PROGRESS NOTES
[facility]  Hyperbaric Oxygen Therapy   Progress Note    NAME: Madelaine Diaz  MEDICAL RECORD NUMBER:  [id number]  AGE: 57 y.o. GENDER: male  : 1960  EPISODE DATE:  5/10/2022   Subjective   HBO Treatment Number: 50 out of Total Treatments: 40  HBO Diagnosis:   Problem List Items Addressed This Visit     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 2 diabetes mellitus, with necrosis of bone ([address] - Primary (Chronic)    Relevant Orders    Hypoglycemial protocol    POCT glucose    Care order/instruction    Notify physician (specify)    Hyperbaric Oxygen Therapy        Safety checks performed prior to treatment. See doc flowsheets for documentation. Objective         Recent Labs     05/10/22  1247 05/10/22  1542   GLUMET 237* 96     Pre treatment Vital Signs       Temp: 97.7 °F (36.5 °C)     Pulse: 71     Resp: 18     BP: 132/74     Post treatment Vital Signs  Temp: 97.7 °F (36.5 °C)  Pulse: 71  Resp: 18  BP: 132/74  Assessment      Physical Exam:  General Appearance:  alert and oriented to person, place and time, well-developed and well-nourished, in no acute distress  ENT:  tympanic membranes intact bilaterally  Pulmonary/Chest:  clear to auscultation bilaterally- no wheezes, rales or rhonchi, normal air movement, no respiratory distress and no chest wall tenderness  Cardiovascular:  regular rate and rhythm  Chamber #: 36  Treatment Start Time: 1355     Pressure Reached Time: 1402  YOLANDA : 2  Number of Air Breaks:  Treatment Status: No Air break     Decompression Time: 1532   Treatment End Time: 1541  Length of Treatment: 90 Minutes  Symptoms Noted During Treatment: None  Total Treatment Time (min): 106    Adverse Event: no    I was present on these premises and immediately available to furnish assistance & direction throughout the procedure.    Plan      Madelaine Diaz is a 57 y.o. male  did successfully complete today's hyperbaric oxygen treatment at [facility] [address]. In my clinical judgement, ongoing HBO therapy is  necessary at this time, given a threat to patient function, limb or life from the current condition. Supervision and attendance of Hyperbaric Oxygen Therapy provided. Continue HBO treatment as outlined in the treatment plan. Hyperbaric Oxygen: Myra Steel tolerated Treatment Number: 48 well today without complications.     Discharge Instructions were explained and given to Mr. Karthikeyan Lowe     Electronically signed by Clark Schuster MD on 5/10/2022 at 4:18 PM

## 2022-05-11 ENCOUNTER — HOSPITAL ENCOUNTER (OUTPATIENT)
Dept: HYPERBARIC MEDICINE | Age: 62
Setting detail: THERAPIES SERIES
Discharge: HOME | End: 2022-05-11
Payer: COMMERCIAL

## 2022-05-11 VITALS
TEMPERATURE: 97.2 F | RESPIRATION RATE: 18 BRPM | SYSTOLIC BLOOD PRESSURE: 140 MMHG | DIASTOLIC BLOOD PRESSURE: 66 MMHG | HEART RATE: 80 BPM

## 2022-05-11 DIAGNOSIS — M54.50 CHRONIC MIDLINE LOW BACK PAIN WITHOUT SCIATICA: ICD-10-CM

## 2022-05-11 DIAGNOSIS — M54.50 CHRONIC LOW BACK PAIN WITHOUT SCIATICA, UNSPECIFIED BACK PAIN LATERALITY: Primary | ICD-10-CM

## 2022-05-11 DIAGNOSIS — L97.424 DIABETIC ULCER OF LEFT HEEL ASSOCIATED WITH TYPE 2 DIABETES MELLITUS, WITH NECROSIS OF BONE (HCC): Primary | ICD-10-CM

## 2022-05-11 DIAGNOSIS — G89.29 CHRONIC LOW BACK PAIN WITHOUT SCIATICA, UNSPECIFIED BACK PAIN LATERALITY: Primary | ICD-10-CM

## 2022-05-11 DIAGNOSIS — G89.29 CHRONIC MIDLINE LOW BACK PAIN WITHOUT SCIATICA: ICD-10-CM

## 2022-05-11 DIAGNOSIS — E11.621 DIABETIC ULCER OF LEFT HEEL ASSOCIATED WITH TYPE 2 DIABETES MELLITUS, WITH NECROSIS OF BONE (HCC): Primary | ICD-10-CM

## 2022-05-11 LAB
METER GLUCOSE: 106 MG/DL (ref 74–99)
METER GLUCOSE: 203 MG/DL (ref 74–99)

## 2022-05-11 PROCEDURE — 99183 HYPERBARIC OXYGEN THERAPY: CPT | Performed by: SURGERY

## 2022-05-11 PROCEDURE — 82962 GLUCOSE BLOOD TEST: CPT

## 2022-05-11 PROCEDURE — G0277 HBOT, FULL BODY CHAMBER, 30M: HCPCS

## 2022-05-11 RX ORDER — TRAMADOL HYDROCHLORIDE 50 MG/1
50 TABLET ORAL 2 TIMES DAILY
Qty: 60 TABLET | Refills: 2 | Status: SHIPPED
Start: 2022-05-11 | End: 2022-05-18

## 2022-05-12 ENCOUNTER — HOSPITAL ENCOUNTER (OUTPATIENT)
Dept: WOUND CARE | Age: 62
Discharge: HOME | End: 2022-05-12
Payer: COMMERCIAL

## 2022-05-12 ENCOUNTER — HOSPITAL ENCOUNTER (OUTPATIENT)
Dept: HYPERBARIC MEDICINE | Age: 62
Setting detail: THERAPIES SERIES
Discharge: HOME | End: 2022-05-12
Payer: COMMERCIAL

## 2022-05-12 VITALS
HEART RATE: 88 BPM | TEMPERATURE: 96.9 F | DIASTOLIC BLOOD PRESSURE: 72 MMHG | SYSTOLIC BLOOD PRESSURE: 120 MMHG | RESPIRATION RATE: 16 BRPM

## 2022-05-12 VITALS
RESPIRATION RATE: 20 BRPM | TEMPERATURE: 98.8 F | HEART RATE: 76 BPM | DIASTOLIC BLOOD PRESSURE: 74 MMHG | SYSTOLIC BLOOD PRESSURE: 122 MMHG

## 2022-05-12 DIAGNOSIS — L08.9 DIABETIC FOOT INFECTION (HCC): ICD-10-CM

## 2022-05-12 DIAGNOSIS — L97.424 ULCER OF LEFT HEEL, WITH NECROSIS OF BONE (HCC): Primary | ICD-10-CM

## 2022-05-12 DIAGNOSIS — L97.424 DIABETIC ULCER OF LEFT HEEL ASSOCIATED WITH TYPE 2 DIABETES MELLITUS, WITH NECROSIS OF BONE (HCC): ICD-10-CM

## 2022-05-12 DIAGNOSIS — E11.628 DIABETIC FOOT INFECTION (HCC): ICD-10-CM

## 2022-05-12 DIAGNOSIS — E11.621 DIABETIC ULCER OF LEFT HEEL ASSOCIATED WITH TYPE 2 DIABETES MELLITUS, WITH NECROSIS OF BONE (HCC): Primary | ICD-10-CM

## 2022-05-12 DIAGNOSIS — L97.424 DIABETIC ULCER OF LEFT HEEL ASSOCIATED WITH TYPE 2 DIABETES MELLITUS, WITH NECROSIS OF BONE (HCC): Primary | ICD-10-CM

## 2022-05-12 DIAGNOSIS — E11.621 DIABETIC ULCER OF LEFT HEEL ASSOCIATED WITH TYPE 2 DIABETES MELLITUS, WITH NECROSIS OF BONE (HCC): ICD-10-CM

## 2022-05-12 LAB
METER GLUCOSE: 131 MG/DL (ref 74–99)
METER GLUCOSE: 164 MG/DL (ref 74–99)

## 2022-05-12 PROCEDURE — 87077 CULTURE AEROBIC IDENTIFY: CPT

## 2022-05-12 PROCEDURE — 87205 SMEAR GRAM STAIN: CPT

## 2022-05-12 PROCEDURE — G0277 HBOT, FULL BODY CHAMBER, 30M: HCPCS

## 2022-05-12 PROCEDURE — 87070 CULTURE OTHR SPECIMN AEROBIC: CPT

## 2022-05-12 PROCEDURE — 99183 HYPERBARIC OXYGEN THERAPY: CPT | Performed by: SURGERY

## 2022-05-12 PROCEDURE — 82962 GLUCOSE BLOOD TEST: CPT

## 2022-05-12 PROCEDURE — 87186 SC STD MICRODIL/AGAR DIL: CPT

## 2022-05-12 PROCEDURE — 99213 OFFICE O/P EST LOW 20 MIN: CPT

## 2022-05-12 RX ORDER — BACITRACIN ZINC AND POLYMYXIN B SULFATE 500; 1000 [USP'U]/G; [USP'U]/G
OINTMENT TOPICAL ONCE
Status: CANCELLED | OUTPATIENT
Start: 2022-05-12 | End: 2022-05-12

## 2022-05-12 RX ORDER — BACITRACIN, NEOMYCIN, POLYMYXIN B 400; 3.5; 5 [USP'U]/G; MG/G; [USP'U]/G
OINTMENT TOPICAL ONCE
Status: CANCELLED | OUTPATIENT
Start: 2022-05-12 | End: 2022-05-12

## 2022-05-12 RX ORDER — LIDOCAINE HYDROCHLORIDE 20 MG/ML
JELLY TOPICAL ONCE
Status: CANCELLED | OUTPATIENT
Start: 2022-05-12 | End: 2022-05-12

## 2022-05-12 RX ORDER — GENTAMICIN SULFATE 1 MG/G
OINTMENT TOPICAL ONCE
Status: CANCELLED | OUTPATIENT
Start: 2022-05-12 | End: 2022-05-12

## 2022-05-12 RX ORDER — GINSENG 100 MG
CAPSULE ORAL ONCE
Status: CANCELLED | OUTPATIENT
Start: 2022-05-12 | End: 2022-05-12

## 2022-05-12 RX ORDER — CLOBETASOL PROPIONATE 0.5 MG/G
OINTMENT TOPICAL ONCE
Status: CANCELLED | OUTPATIENT
Start: 2022-05-12 | End: 2022-05-12

## 2022-05-12 RX ORDER — BETAMETHASONE DIPROPIONATE 0.05 %
OINTMENT (GRAM) TOPICAL ONCE
Status: CANCELLED | OUTPATIENT
Start: 2022-05-12 | End: 2022-05-12

## 2022-05-12 RX ORDER — LIDOCAINE HYDROCHLORIDE 40 MG/ML
SOLUTION TOPICAL ONCE
Status: DISCONTINUED | OUTPATIENT
Start: 2022-05-12 | End: 2022-05-13 | Stop reason: HOSPADM

## 2022-05-12 RX ORDER — LIDOCAINE 40 MG/G
CREAM TOPICAL ONCE
Status: CANCELLED | OUTPATIENT
Start: 2022-05-12 | End: 2022-05-12

## 2022-05-12 RX ORDER — LIDOCAINE HYDROCHLORIDE 40 MG/ML
SOLUTION TOPICAL ONCE
Status: CANCELLED | OUTPATIENT
Start: 2022-05-12 | End: 2022-05-12

## 2022-05-12 RX ORDER — LIDOCAINE 50 MG/G
OINTMENT TOPICAL ONCE
Status: CANCELLED | OUTPATIENT
Start: 2022-05-12 | End: 2022-05-12

## 2022-05-12 ASSESSMENT — PAIN DESCRIPTION - DESCRIPTORS: DESCRIPTORS: SORE;ACHING;DISCOMFORT

## 2022-05-12 ASSESSMENT — PAIN DESCRIPTION - PAIN TYPE: TYPE: CHRONIC PAIN

## 2022-05-12 ASSESSMENT — PAIN DESCRIPTION - ORIENTATION: ORIENTATION: LEFT

## 2022-05-12 ASSESSMENT — PAIN DESCRIPTION - FREQUENCY: FREQUENCY: INTERMITTENT

## 2022-05-12 ASSESSMENT — PAIN - FUNCTIONAL ASSESSMENT: PAIN_FUNCTIONAL_ASSESSMENT: ACTIVITIES ARE NOT PREVENTED

## 2022-05-12 ASSESSMENT — PAIN SCALES - GENERAL: PAINLEVEL_OUTOF10: 4

## 2022-05-12 ASSESSMENT — PAIN DESCRIPTION - LOCATION: LOCATION: FOOT

## 2022-05-12 ASSESSMENT — PAIN DESCRIPTION - ONSET: ONSET: ON-GOING

## 2022-05-12 NOTE — PROGRESS NOTES
[facility]  Hyperbaric Oxygen Therapy   Progress Note    NAME: Patricia Cabral  MEDICAL RECORD NUMBER:  [id number]  AGE: 57 y.o. GENDER: male  : 1960  EPISODE DATE:  2022   Subjective   HBO Treatment Number: 37 out of    HBO Diagnosis:   Problem List Items Addressed This Visit     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 2 diabetes mellitus, with necrosis of bone ([address] - Primary (Chronic)    Relevant Orders    POCT glucose        Safety checks performed prior to treatment. See doc flowsheets for documentation. Objective         Recent Labs     22  1241 22  1629   GLUMET 203* 106*     Pre treatment Vital Signs       Temp: 97.1 °F (36.2 °C)     Pulse: 84     Resp: 16     BP: (!) 141/74     Post treatment Vital Signs  Temp: 97.6 °F (36.4 °C)  Pulse: 88  Resp: 16  BP: 122/70  Assessment      Physical Exam:  General Appearance:  alert and oriented to person, place and time, well-developed and well-nourished, in no acute distress  ENT:  tympanic membranes intact bilaterally  Pulmonary/Chest:  clear to auscultation bilaterally- no wheezes, rales or rhonchi, normal air movement, no respiratory distress  Cardiovascular:  regular rate and rhythm  Chamber #: 36  Treatment Start Time: 1407     Pressure Reached Time: 1416  YOLANDA : 2  Number of Air Breaks:  Treatment Status: No Air break     Decompression Time: 1546   Treatment End Time: 0661  Length of Treatment: 90 Minutes  Symptoms Noted During Treatment: None  Total Treatment Time (min): 108    Adverse Event: no    I was present on these premises and immediately available to furnish assistance & direction throughout the procedure. Plan      Patricia Cabral is a 57 y.o. male  did successfully complete today's hyperbaric oxygen treatment at [address] [place] [address].     In my clinical judgement, ongoing HBO therapy is  necessary at this time, given a threat to patient function, limb or life from the current condition. Supervision and attendance of Hyperbaric Oxygen Therapy provided. Continue HBO treatment as outlined in the treatment plan. Hyperbaric Oxygen: Johnney Barthel tolerated Treatment Number: 37 well today without complications.     Discharge Instructions were explained and given to Mr. Karthikeyan Lowe     Electronically signed by Zach Strong MD on 4/27/2022 at 11:15 PM

## 2022-05-12 NOTE — PROGRESS NOTES
[facility]  Hyperbaric Oxygen Therapy   Progress Note    NAME: Guadalupe Sauer  MEDICAL RECORD NUMBER:  [id number]  AGE: 57 y.o. GENDER: male  : 1960  EPISODE DATE:  2022   Subjective   HBO Treatment Number: 55 out of Total Treatments: 40  HBO Diagnosis:   Problem List Items Addressed This Visit     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 2 diabetes mellitus, with necrosis of bone ([address] - Primary (Chronic)    Relevant Orders    POCT glucose        Safety checks performed prior to treatment. See doc flowsheets for documentation. Objective         Recent Labs     22  1241 22  1629   GLUMET 203* 106*     Pre treatment Vital Signs       Temp: 97.5 °F (36.4 °C)     Pulse: 80     Resp: 16     BP: (!) 152/82     Post treatment Vital Signs  Temp: 97.2 °F (36.2 °C)  Pulse: 72  Resp: 16  BP: 130/62  Assessment      Physical Exam:  General Appearance:  alert and oriented to person, place and time, well-developed and well-nourished, in no acute distress  ENT:  tympanic membranes intact bilaterally  Pulmonary/Chest:  clear to auscultation bilaterally- no wheezes, rales or rhonchi, normal air movement, no respiratory distress  Cardiovascular:  regular rate and rhythm  Chamber #: 36  Treatment Start Time: 1417     Pressure Reached Time: 1424  YOLANDA : 2  Number of Air Breaks:  Treatment Status: No Air break     Decompression Time: 1559   Treatment End Time: 8714  Length of Treatment: 90 Minutes  Symptoms Noted During Treatment: None  Total Treatment Time (min): 108    Adverse Event: no    I was present on these premises and immediately available to furnish assistance & direction throughout the procedure. Plan      Guadalupe Suaer is a 57 y.o. male  did successfully complete today's hyperbaric oxygen treatment at [address] [place] [address].     In my clinical judgement, ongoing HBO therapy is  necessary at this time, given a threat to patient function, limb or life from the current condition. Supervision and attendance of Hyperbaric Oxygen Therapy provided. Continue HBO treatment as outlined in the treatment plan. Hyperbaric Oxygen: Kinjal Shetty tolerated Treatment Number: 55 well today without complications.     Discharge Instructions were explained and given to  Erica Yovani     Electronically signed by Khalif Gaston MD on 5/4/2022 at 11:17 PM

## 2022-05-12 NOTE — PROGRESS NOTES
[facility] Center Followup Visit Note  Referring Physician : Mere Tapia MD  [id number] RECORD NUMBER:  [id number]  AGE: 57 y.o. GENDER: male  : 1960  EPISODE DATE:  2022  Subjective:     Chief Complaint   Patient presents with    Wound Check     lle      HISTORY of PRESENT ILLNESS HPI   Hans Ascencio is a 57 y.o. male who presents today in regards to follow up evaluation and treatment of wound/ulcer. That patient's past medical, family and social hx were reviewed and changes were made if present. History of Wound Context:  Pt is known to ID s/p D/c from [facility] on  2021  Came in with 1. Left heel ulceration and necrosis of muscle. 2.  Peripheral arterial disease. 3. Acute osteomyelitis, left foot. 4. Cellulitis, left foot with abscess.     Current visit:  22   HAS NO C/O OFF ATBX  STILL WITH HBO  HAS MRI + FOR OM    3/31/2022  Has no c/o  No issues with atbx  Still getting hbot    3/3/2022  Has no c/o f/c  ON HBOT   Taking po omnicef/doxy no issues   Using gent to left heel has hematoma left le -Hit his fridge     2022  Pt has no c/o   On atbx  No f/c/n/v/d    2021   Pt doing well no new issues he is at home    10/28/2021  Currently wound bed looks healthy    wound cx vse/corynebacterium  On cipro/doxy    7/15/2021  NAD NO ISSUES WITH ATBX CIPRO/DOXY   LEFT HEEL WOUND SAME NO SIGNS OF INFECTION     2021  Has no c/o  On doxy  Was supposed to be on cipro also   Using gent ointment   vacc off    6/3/2021  A wound culture SHOWED MRSA  MRI SHOWED ACUTE OM/MYOSITIS/CELLULITS  FINISHED  LINEZOLID 600MG PO Q12  ON DOXY   RECX   NO F/C/N/V/D/  50% WEIGHT BEARING    2021  IN WC NAD NO C/O  NO PAIN LE   MRI NOTED   ON DOXY     2021  Still at [facility] on doxycycline he has no isues with it  Left heel has some pain  He is asking about more weight bearing     Sp vancomycin (VANCOCIN) 1,000 mg  Q12H can stop today/2021  Here for f/u left post heel ulcer   Has wound vacc   Currently off has bone palpable  periwound inatct   has left >right ble swelling   No calf pain   Has no c/o  Rue picc without swelling or pain  Tolerating doxycyline informed side effects of photosensitivity    2/19 Bilateral iliac angiogram, left femoral  angiography, nitroglycerin infusion left common femoral artery 1000 mcg  x2 boluses, right common femoral artery to left popliteal artery  catheterization, PTA of the left SFA and popliteal artery with 4 x 80  RapidCross balloon, 5 x 300 Saber balloon, 5 x 220 Powerflex balloon, 6  x 100 Powerflex balloon and 6 x 250 IN. PACT Admiral balloon, completion  left femoral angiography, ProGlide closure right common femoral artery  access site. 2/16 1. Excision and debridement of the left heel ulceration to and through  level of deep fascia and muscle. Total measurement is 4.5 cm x 6.0 cm x  0.5 cm in dimension. 2.  Incision and drainage, left foot abscess. 3.  Bone biopsy, left foot. 4.  Application of wound VAC negative pressure therapy.   cx Enterococcus  Path Bone biopsy left heel: Medullary bone, negative for osteomyelitis.     Most Recent MICRO    3/3 wound cx ngtd  Organism   Date Value Ref Range Status   11/29/2021 Enterobacter cloacae complex (A)  Final   11/29/2021 Staphylococcus haemolyticus (A)  Final   11/29/2021 Corynebacterium species (A)  Final     PAST MEDICAL HISTORY      Diagnosis Date    Cerebral artery occlusion with cerebral infarction ([address]     Diabetes mellitus ([address]     Type 2    Diabetic foot ulcer with osteomyelitis ([address] 12/21/2021    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 2 diabetes mellitus, with necrosis of bone ([address] 12/21/2021    Hemiparesis affecting left side as late effect of cerebrovascular accident ([address]     LEFT SIDE NON DOMINANT FOLLOWING STROKE    Hypertension     Peripheral vascular angioplasty status 12/21/2021    Ulcer of left heel, with necrosis of bone ([address] 12/27/2021    Ulcer of left heel, with necrosis of muscle ([address] 12/21/2021     Past Surgical History:   Procedure Laterality Date    FINGER AMPUTATION      FOOT DEBRIDEMENT Left 2/16/2021    LEFT FOOT DEBRIDEMENT WITH BONE BIOPSY, WOUND VAC APPLICATION performed by Nishi Viramontes DPM at [place] [address] ARTHROSCOPY      TONSILLECTOMY      TRANSESOPHAGEAL ECHOCARDIOGRAM N/A 2/22/2021    TRANSESOPHAGEAL ECHOCARDIOGRAM WITH BUBBLE STUDY performed by Kevin Singh MD at [facility] N/A 1/4/2021    EGD BIOPSY performed by Hong Major DO at [place] [address] reviewed. No pertinent family history. Social History     Tobacco Use    Smoking status: Former Smoker    Smokeless tobacco: Never Used   Vaping Use    Vaping Use: Never used   Substance Use Topics    Alcohol use: Not Currently     Comment: Former every day drinker for most of adult life    Drug use: No     Allergies   Allergen Reactions    Pcn [Penicillins]      Current Outpatient Medications on File Prior to Encounter   Medication Sig Dispense Refill    traMADol (ULTRAM) 50 MG tablet Take 1 tablet by mouth 2 times daily for 30 days. 60 tablet 2    cyclobenzaprine (FLEXERIL) 5 MG tablet Take 1 tablet by mouth nightly as needed for Muscle spasms 30 tablet 2    gentamicin (GARAMYCIN) 0.1 % cream Apply topically with each dressing change 30 g 3    diphenhydrAMINE (BENADRYL ALLERGY) 25 MG capsule Take 25 mg by mouth every 6 hours as needed for Itching      gabapentin (NEURONTIN) 100 MG capsule Take 100 mg by mouth 2 times daily.        B Complex-C-E-Zn (STRESS B/ZINC) TABS Take 1 tablet by mouth daily      acetaminophen (TYLENOL) 325 MG tablet Take 650 mg by mouth every 6 hours as needed for Pain      ferrous sulfate (IRON 325) 325 (65 Fe) MG tablet Take 325 mg by mouth daily (with breakfast)      vitamin D (ERGOCALCIFEROL) 1.25 MG (67495 UT) CAPS capsule Take 1 capsule by mouth once a week 5 capsule 0    melatonin 3 MG TABS tablet Take 9 mg by mouth nightly as needed      aspirin 81 MG EC tablet Take 81 mg by mouth daily      hydroCHLOROthiazide (HYDRODIURIL) 25 MG tablet Take 25 mg by mouth daily      metFORMIN (GLUCOPHAGE) 500 MG tablet Take 1 tablet by mouth 2 times daily (with meals) 60 tablet 3    atorvastatin (LIPITOR) 40 MG tablet Take 1 tablet by mouth nightly 30 tablet 3    clopidogrel (PLAVIX) 75 MG tablet Take 1 tablet by mouth daily 30 tablet 3     No current facility-administered medications on file prior to encounter. REVIEW OF SYSTEMS See HPI  Objective:    /74   Pulse 76   Temp 98.8 °F (37.1 °C) (Temporal)   Resp 20   Wt Readings from Last 3 Encounters:   05/09/22 195 lb (88.5 kg)   04/18/22 195 lb (88.5 kg)   04/08/22 195 lb (88.5 kg)     PHYSICAL EXAM  CONSTITUTIONAL:  nad  HEENT: AT/NC glasses  HEART: S1/S2  RS: CTAB  Post   ABD: SOFT NT/ND    EXT:  Trace EDEMA   SKIN: Open wound Present post heel wound bed  Moist    Psych pleasant    Wound Care Documentation:  Wound 12/26/20 Arm Left (Active)   Number of days: 501       Wound 02/15/21 Heel Left (Active)   Number of days: 451       Wound 02/15/21 Heel Right (Active)   Number of days: 451       Wound 03/08/21 Heel Left #1 left heel aquired 12/1/20 (Active)   Wound Image   05/12/22 1306   Wound Etiology Diabetic 02/14/22 1344   Dressing Status New dressing applied;Clean;Dry; Intact 05/09/22 1405   Wound Cleansed Cleansed with saline 05/09/22 1405   Dressing/Treatment Alginate;Dry dressing 05/09/22 1405   Offloading for Diabetic Foot Ulcers Post op shoe 05/09/22 1405   Wound Length (cm) 4 cm 05/12/22 1306   Wound Width (cm) 3 cm 05/12/22 1306   Wound Depth (cm) 0.6 cm 05/12/22 1306   Wound Surface Area (cm^2) 12 cm^2 05/12/22 1306   Change in Wound Size % (l*w) 0.74 05/12/22 1306   Wound Volume (cm^3) 7.2 cm^3 05/12/22 1306   Wound Healing % 15 05/12/22 1306   Post-Procedure Length (cm) 4.4 cm 05/09/22 1351 Post-Procedure Width (cm) 3.3 cm 05/09/22 1351   Post-Procedure Depth (cm) 0.5 cm 05/09/22 1351   Post-Procedure Surface Area (cm^2) 14.52 cm^2 05/09/22 1351   Post-Procedure Volume (cm^3) 7.26 cm^3 05/09/22 1351   Undermining Starts ___ O'Clock 6 04/18/22 1315   Undermining Ends___ O'Clock 12 04/18/22 1315   Undermining Maxium Distance (cm) [email or web address] 04/18/22 1315   Wound Assessment Fibrin;Pale granulation tissue; Exposed structure bone 05/12/22 1306   Drainage Amount Moderate 05/12/22 1306   Drainage Description Serosanguinous; Yellow 05/12/22 1306   Odor None 05/12/22 1306   Adrianne-wound Assessment Maceration;Blanchable erythema;Edematous 05/12/22 1306   Number of days: 430       Wound 03/08/21 Heel Right #2 rt heel aquired 12/1/20 (Active)   Wound Image   04/05/21 0929   Dressing Status New dressing applied 03/18/21 1352   Wound Cleansed Cleansed with saline 04/01/21 1536   Dressing/Treatment Alginate;Collagen;Silicone border 09/55/38 1536   Offloading for Diabetic Foot Ulcers Post op shoe 04/01/21 1536   Wound Length (cm) 0 cm 04/05/21 0929   Wound Width (cm) 0 cm 04/05/21 0929   Wound Depth (cm) 0 cm 04/05/21 0929   Wound Surface Area (cm^2) 0 cm^2 04/05/21 0929   Change in Wound Size % (l*w) 100 04/05/21 0929   Wound Volume (cm^3) 0 cm^3 04/05/21 0929   Wound Healing % 100 04/05/21 0929   Post-Procedure Length (cm) 0 cm 04/05/21 1011   Post-Procedure Width (cm) 0 cm 04/05/21 1011   Post-Procedure Depth (cm) 0 cm 04/05/21 1011   Post-Procedure Surface Area (cm^2) 0 cm^2 04/05/21 1011   Post-Procedure Volume (cm^3) 0 cm^3 04/05/21 1011   Wound Assessment Fibrin 04/01/21 1357   Drainage Amount None 04/01/21 1357   Drainage Description Yellow 03/22/21 0911   Odor None 04/01/21 1357   Adrianne-wound Assessment Maceration 04/01/21 1357   Number of days: 430       Wound 05/12/22 Pretibial Left #2 (Active)   Wound Image   05/12/22 1306   Wound Etiology Skin Tear 05/12/22 1306   Wound Length (cm) 1.3 cm 05/12/22 1306   Wound Width (cm) 1.2 cm 05/12/22 1306   Wound Depth (cm) 0.1 cm 05/12/22 1306   Wound Surface Area (cm^2) 1.56 cm^2 05/12/22 1306   Wound Volume (cm^3) 0.156 cm^3 05/12/22 1306   Wound Assessment Pink/red 05/12/22 1306   Drainage Amount Small 05/12/22 1306   Drainage Description Serous 05/12/22 1306   Odor None 05/12/22 1306   Adrianne-wound Assessment Edematous; Intact 05/12/22 1306   Number of days: 0         Assessment:   S/p rx MSSA bacteremia   BILATERAL HEEL PRESSURE ULCERS   NECROTIC WOUND, OSTEOMYELITIS, Cellulitis left foot with palpable bone   S/p LEFT FOOT DEBRIDEMENT WITH BONE BIOPSY, WOUND VAC APPLICATION  Bone biopsy left heel: Medullary bone, negative for osteomyelitis. S/p angio    UTI  - proteus s/p rx with cefepime  H/o vitamin D deficiency on suppleemnts    2/14 wound cx Mixed Gram positive organisms   Proteus species   TTE Summary No vegetations seen otherwise. HAS CHRONIC POST LEFT  HEEL WOUND   MRI 5/11/2022  WITH ACUTE OM POSTERIOR CALCANEUS  CHECK CX TODAY    Plan:      Orders Placed This Encounter   Procedures    Initiate Outpatient Wound Care Protocol    Culture, Wound     Orders Placed This Encounter   Medications    lidocaine (XYLOCAINE) 4 % external solution     F/u 2 weeks    Plan:   Treatment Note please see attached Discharge Instructions    Written patient dismissal instructions given to patient and signed by patient or POA.          Discharge Instructions       Visit Discharge/Physician Orders     Assessment of pain at discharge: moderate     Anesthetic used: 4% liquid lidocaine solution      Discharge to:      Left via:ambulance     Accompanied by: self     [facility]      Dressing Orders:  Cleanse left heel ulcer with normal saline solution apply santyl ointment and cover with plain alginate and dry dressing and change every day or as needed for drainage.  .      Treatment Orders:   Wear prevalon boots or heel ayo while in bed.          May be partial weight bearing up to 50% on left with surgical shoe.     HBO continue     Consider surgical debridement of infected bone in heel with skin graft.     Start PT evaluate and treat as indicated three times a week.     Vascular studies completed 4/27/22 reviewed in clinic today     Start workup for graft        [facility] followup visit: ______________ 1 week Dr MENDOZA______________  (Please note your next appointment above and if you are unable to keep, kindly give a 24 hour notice. Thank you.)     Physician signature:__________________________        If you experience any of the following, please call the [facility] during business hours:     * Increase in Pain  * Temperature over 101  * Increase in drainage from your wound  * Drainage with a foul odor  * Bleeding  * Increase in swelling  * Need for compression bandage changes due to slippage, breakthrough drainage.     If you need medical attention outside of the business hours of the [facility] please contact your PCP or go to the nearest emergency room.       Electronically signed by Martín Dai MD on 5/12/2022 at 1:33 PM

## 2022-05-12 NOTE — PROGRESS NOTES
[facility]  Hyperbaric Oxygen Therapy   Progress Note    NAME: Melva Collazo  MEDICAL RECORD NUMBER:  [id number]  AGE: 57 y.o. GENDER: male  : 1960  EPISODE DATE:  2022   Subjective   HBO Treatment Number: 46 out of Total Treatments: 40  HBO Diagnosis:   Problem List Items Addressed This Visit     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 2 diabetes mellitus, with necrosis of bone ([address] - Primary (Chronic)    Relevant Orders    Hypoglycemial protocol    POCT glucose    Care order/instruction    Care order/instruction    Notify physician (specify)        Safety checks performed prior to treatment. See doc flowsheets for documentation. Objective         Recent Labs     22  1241 22  1629   GLUMET 203* 106*     Pre treatment Vital Signs       Temp: 96.8 °F (36 °C)     Pulse: 76     Resp: 18     BP: 118/68     Post treatment Vital Signs  Temp: 97.2 °F (36.2 °C)  Pulse: 80  Resp: 18  BP: (!) 140/66  Assessment      Physical Exam:  General Appearance:  alert and oriented to person, place and time, well-developed and well-nourished, in no acute distress  ENT:  tympanic membranes intact bilaterally  Pulmonary/Chest:  clear to auscultation bilaterally- no wheezes, rales or rhonchi, normal air movement, no respiratory distress  Cardiovascular:  regular rate and rhythm  Chamber #: 38  Treatment Start Time: 1427     Pressure Reached Time: 1447  YOLANDA : 2  Number of Air Breaks:  Treatment Status: No Air break     Decompression Time: 1617   Treatment End Time: 1627  Length of Treatment: 90 Minutes  Symptoms Noted During Treatment: None  Total Treatment Time (min): 120    Adverse Event: no    I was present on these premises and immediately available to furnish assistance & direction throughout the procedure.    Plan      Melva Collazo is a 57 y.o. male  did successfully complete today's hyperbaric oxygen treatment at [facility]. In my clinical judgement, ongoing HBO therapy is  necessary at this time, given a threat to patient function, limb or life from the current condition. Supervision and attendance of Hyperbaric Oxygen Therapy provided. Continue HBO treatment as outlined in the treatment plan. Hyperbaric Oxygen: Konrad Shankar tolerated Treatment Number: 56 well today without complications.     Discharge Instructions were explained and given to  Bar Vee     Electronically signed by Eliezer Houser MD on 5/11/2022 at 11:19 PM

## 2022-05-12 NOTE — PLAN OF CARE
Problem: Wound:  Goal: Will show signs of wound healing; wound closure and no evidence of infection  Description: Will show signs of wound healing; wound closure and no evidence of infection  Outcome: Progressing     Problem: Blood Glucose:  Goal: Ability to maintain appropriate glucose levels will improve  Description: Ability to maintain appropriate glucose levels will improve  Outcome: Progressing     Problem: Pain  Goal: Verbalizes/displays adequate comfort level or baseline comfort level  Outcome: Progressing

## 2022-05-12 NOTE — PROGRESS NOTES
[facility]  Hyperbaric Oxygen Therapy   Progress Note    NAME: Cindy Bearden  MEDICAL RECORD NUMBER:  [id number]  AGE: 57 y.o. GENDER: male  : 1960  EPISODE DATE:  2022   Subjective   HBO Treatment Number: 37 out of Total Treatments: 40  HBO Diagnosis:   Problem List Items Addressed This Visit     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 2 diabetes mellitus, with necrosis of bone ([address] - Primary (Chronic)    Relevant Orders    POCT glucose        Safety checks performed prior to treatment. See doc flowsheets for documentation. Objective         Recent Labs     22  1241 22  1629   GLUMET 203* 106*     Pre treatment Vital Signs       Temp: 96.8 °F (36 °C)     Pulse: 73     Resp: 20     BP: (!) 141/80     Post treatment Vital Signs  Temp: 97.5 °F (36.4 °C)  Pulse: 72  Resp: 16  BP: 118/70  Assessment      Physical Exam:  General Appearance:  alert and oriented to person, place and time, well-developed and well-nourished, in no acute distress  ENT:  tympanic membranes intact bilaterally  Pulmonary/Chest:  clear to auscultation bilaterally- no wheezes, rales or rhonchi, normal air movement, no respiratory distress  Cardiovascular:  regular rate and rhythm  Chamber #: 36  Treatment Start Time: 1417     Pressure Reached Time: 1427  YOLANDA : 2  Number of Air Breaks:  Treatment Status: No Air break     Decompression Time: 1557   Treatment End Time: 1607  Length of Treatment: 90 Minutes  Symptoms Noted During Treatment: None  Total Treatment Time (min): 110    Adverse Event: no    I was present on these premises and immediately available to furnish assistance & direction throughout the procedure. Jack Bearden is a 57 y.o. male  did successfully complete today's hyperbaric oxygen treatment at [address] [place] [address].     In my clinical judgement, ongoing HBO therapy is  necessary at this time, given a threat to patient function, limb or life from the current condition. Supervision and attendance of Hyperbaric Oxygen Therapy provided. Continue HBO treatment as outlined in the treatment plan. Hyperbaric Oxygen: Marco Steel tolerated Treatment Number: 40 well today without complications.     Discharge Instructions were explained and given to Mr. Garcia Dinora     Electronically signed by Devaughn Carbone MD on 4/13/2022 at 11:14 PM

## 2022-05-12 NOTE — PROGRESS NOTES
[facility]  Hyperbaric Oxygen Therapy   Progress Note    NAME: Bertha Castanon  MEDICAL RECORD NUMBER:  [id number]  AGE: 57 y.o. GENDER: male  : 1960  EPISODE DATE:  2022   Subjective   HBO Treatment Number: 46 out of Total Treatments: 40  HBO Diagnosis:   Problem List Items Addressed This Visit     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 2 diabetes mellitus, with necrosis of bone ([address] - Primary (Chronic)    Relevant Orders    Hypoglycemial protocol    POCT glucose    Care order/instruction    Notify physician (specify)    Hypoglycemial protocol    POCT glucose    Care order/instruction    Care order/instruction        Safety checks performed prior to treatment. See doc flowsheets for documentation. Objective         Recent Labs     22  1349 22  1613   GLUMET 164* 131*     Pre treatment Vital Signs       Temp: 97.2 °F (36.2 °C)     Pulse: 96     Resp: 18     BP: 118/66     Post treatment Vital Signs  Temp: 96.9 °F (36.1 °C)  Pulse: 88  Resp: 16  BP: 120/72  Assessment      Physical Exam:  General Appearance:  alert and oriented to person, place and time, well-developed and well-nourished, in no acute distress  ENT:  tympanic membranes intact bilaterally  Pulmonary/Chest:  clear to auscultation bilaterally- no wheezes, rales or rhonchi, normal air movement, no respiratory distress  Cardiovascular:  regular rate and rhythm  Chamber #: 36  Treatment Start Time: 1419     Pressure Reached Time: 1431  YOLANDA : 2  Number of Air Breaks:  Treatment Status: No Air break     Decompression Time: 1602   Treatment End Time: 1608  Length of Treatment: 90 Minutes  Symptoms Noted During Treatment: None  Total Treatment Time (min): 109    Adverse Event: no    I was present on these premises and immediately available to furnish assistance & direction throughout the procedure.    Plan      Bertha Castanon is a 57 y.o. male  did successfully complete today's hyperbaric oxygen treatment at [address] [place] [address]. In my clinical judgement, ongoing HBO therapy is  necessary at this time, given a threat to patient function, limb or life from the current condition. Supervision and attendance of Hyperbaric Oxygen Therapy provided. Continue HBO treatment as outlined in the treatment plan. Hyperbaric Oxygen: Londell Nissen tolerated Treatment Number: 57 well today without complications.     Discharge Instructions were explained and given to Mr. Johann Mckinnon     Electronically signed by Hector Urbina MD on 5/12/2022 at 4:31 PM

## 2022-05-13 ENCOUNTER — HOSPITAL ENCOUNTER (OUTPATIENT)
Dept: HYPERBARIC MEDICINE | Age: 62
Setting detail: THERAPIES SERIES
Discharge: HOME | End: 2022-05-13
Payer: COMMERCIAL

## 2022-05-15 LAB
GRAM STAIN RESULT: ABNORMAL
ORGANISM: ABNORMAL
WOUND/ABSCESS: ABNORMAL

## 2022-05-16 ENCOUNTER — HOSPITAL ENCOUNTER (OUTPATIENT)
Dept: HYPERBARIC MEDICINE | Age: 62
Setting detail: THERAPIES SERIES
Discharge: HOME | End: 2022-05-16
Payer: COMMERCIAL

## 2022-05-16 ENCOUNTER — TELEPHONE (OUTPATIENT)
Dept: WOUND CARE | Age: 62
End: 2022-05-16

## 2022-05-16 RX ORDER — SULFAMETHOXAZOLE AND TRIMETHOPRIM 800; 160 MG/1; MG/1
1 TABLET ORAL DAILY
Qty: 30 TABLET | Refills: 0 | Status: SHIPPED | OUTPATIENT
Start: 2022-05-16 | End: 2022-06-15

## 2022-05-17 ENCOUNTER — HOSPITAL ENCOUNTER (OUTPATIENT)
Dept: HYPERBARIC MEDICINE | Age: 62
Setting detail: THERAPIES SERIES
Discharge: HOME | End: 2022-05-17
Payer: COMMERCIAL

## 2022-05-17 VITALS
HEART RATE: 79 BPM | RESPIRATION RATE: 17 BRPM | SYSTOLIC BLOOD PRESSURE: 132 MMHG | DIASTOLIC BLOOD PRESSURE: 72 MMHG | TEMPERATURE: 97.6 F

## 2022-05-17 DIAGNOSIS — L97.424 DIABETIC ULCER OF LEFT HEEL ASSOCIATED WITH TYPE 2 DIABETES MELLITUS, WITH NECROSIS OF BONE (HCC): Primary | ICD-10-CM

## 2022-05-17 DIAGNOSIS — E11.621 DIABETIC ULCER OF LEFT HEEL ASSOCIATED WITH TYPE 2 DIABETES MELLITUS, WITH NECROSIS OF BONE (HCC): Primary | ICD-10-CM

## 2022-05-17 LAB
METER GLUCOSE: 129 MG/DL (ref 74–99)
METER GLUCOSE: 176 MG/DL (ref 74–99)

## 2022-05-17 PROCEDURE — 82962 GLUCOSE BLOOD TEST: CPT

## 2022-05-17 PROCEDURE — 99183 HYPERBARIC OXYGEN THERAPY: CPT | Performed by: SURGERY

## 2022-05-17 PROCEDURE — G0277 HBOT, FULL BODY CHAMBER, 30M: HCPCS

## 2022-05-17 NOTE — PROGRESS NOTES
[facility]  Hyperbaric Oxygen Therapy   Progress Note    NAME: Ari Grove  MEDICAL RECORD NUMBER:  [id number]  AGE: 57 y.o. GENDER: male  : 1960  EPISODE DATE:  2022   Subjective   HBO Treatment Number: 52 out of Total Treatments: 40  HBO Diagnosis: diabetic foot ulcer with necrosis of bone    Safety checks performed prior to treatment by HBOT staff. See doc flowsheets for documentation. Objective         Recent Labs     22  1324   GLUMET 176*     Pre treatment Vital Signs       Temp: 97 °F (36.1 °C)     Pulse: 73     Resp: 18     BP: (!) 142/68     Post treatment Vital Signs  Temp: 97.6 °F (36.4 °C)  Pulse: 76  Resp: 18  BP: 130/70  Assessment      Physical Exam:  General Appearance:  alert and oriented to person, place and time, well-developed and well-nourished, in no acute distress  ENT:  tympanic membranes intact bilaterally, normal color, normal light reflex bilaterally  Pulmonary/Chest:  clear to auscultation bilaterally- no wheezes, rales or rhonchi, normal air movement, no respiratory distress  Cardiovascular:  regular rate and rhythm  Chamber #: 36  Treatment Start Time: 1433     Pressure Reached Time: 1442  YOLANDA : 2  Number of Air Breaks:  Treatment Status: No Air break     Decompression Time: 1612   Treatment End Time: 1622  Length of Treatment: 90 Minutes  Symptoms Noted During Treatment: None  Total Treatment Time (min): 109    Adverse Event: no    I was present on these premises and immediately available to furnish assistance & direction throughout the procedure. Plan      Ari Grove is a 57 y.o. male  did successfully complete today's hyperbaric oxygen treatment at [address] [place] [address]. In my clinical judgement, ongoing HBO therapy is necessary at this time, given a threat to patient function, limb or life from the current condition. Supervision and attendance of Hyperbaric Oxygen Therapy provided. Continue HBO treatment as outlined in the treatment plan. Hyperbaric Oxygen: Abner  tolerated Treatment Number: 52 well today without complications.     Discharge Instructions were explained and given to Mr. Dwayne Mancini by HBOT staff    Electronically signed by Kathryn John MD

## 2022-05-17 NOTE — PROGRESS NOTES
[facility]  Hyperbaric Oxygen Therapy   Progress Note    NAME: Mera Mijares  MEDICAL RECORD NUMBER:  [id number]  AGE: 57 y.o. GENDER: male  : 1960  EPISODE DATE:  2022   Subjective   HBO Treatment Number: 48 out of Total Treatments: 40  HBO Diagnosis:   Problem List Items Addressed This Visit     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 2 diabetes mellitus, with necrosis of bone ([address] - Primary (Chronic)    Relevant Orders    Hypoglycemial protocol    POCT glucose    Care order/instruction    Notify physician (specify)    Care order/instruction    Care order/instruction        Safety checks performed prior to treatment. See doc flowsheets for documentation. Objective         Recent Labs     22  1324 22  1607   GLUMET 176* 129*     Pre treatment Vital Signs       Temp: 97.2 °F (36.2 °C)     Pulse: 94     Resp: 16     BP: 116/66     Post treatment Vital Signs  Temp: 97.6 °F (36.4 °C)  Pulse: 79  Resp: 17  BP: 132/72  Assessment      Physical Exam:  General Appearance:  alert and oriented to person, place and time, well-developed and well-nourished, in no acute distress  ENT:  tympanic membranes intact bilaterally  Pulmonary/Chest:  clear to auscultation bilaterally- no wheezes, rales or rhonchi, normal air movement, no respiratory distress  Cardiovascular:  regular rate and rhythm  Chamber #: 36  Treatment Start Time: 1415     Pressure Reached Time: 1426  YOLANDA : 2  Number of Air Breaks:  Treatment Status: No Air break     Decompression Time: 1556   Treatment End Time: 3558  Length of Treatment: 90 Minutes  Symptoms Noted During Treatment: None  Total Treatment Time (min): 110    Adverse Event: no    I was present on these premises and immediately available to furnish assistance & direction throughout the procedure.    Plan      Mera Mijares is a 57 y.o. male  did successfully complete today's hyperbaric oxygen treatment at [facility]. In my clinical judgement, ongoing HBO therapy is  necessary at this time, given a threat to patient function, limb or life from the current condition. Supervision and attendance of Hyperbaric Oxygen Therapy provided. Continue HBO treatment as outlined in the treatment plan. Hyperbaric Oxygen: Dre Lewis tolerated Treatment Number: 48 well today without complications.     Discharge Instructions were explained and given to Theresa Trevon Silvia     Electronically signed by Sid Murray MD on 5/17/2022 at 4:25 PM

## 2022-05-18 ENCOUNTER — APPOINTMENT (OUTPATIENT)
Dept: HYPERBARIC MEDICINE | Age: 62
End: 2022-05-18
Payer: COMMERCIAL

## 2022-05-18 ENCOUNTER — HOSPITAL ENCOUNTER (OUTPATIENT)
Dept: WOUND CARE | Age: 62
Discharge: HOME | End: 2022-05-18
Payer: COMMERCIAL

## 2022-05-18 VITALS
HEIGHT: 70 IN | HEART RATE: 6 BPM | TEMPERATURE: 98.2 F | RESPIRATION RATE: 16 BRPM | SYSTOLIC BLOOD PRESSURE: 116 MMHG | DIASTOLIC BLOOD PRESSURE: 76 MMHG | WEIGHT: 195 LBS | BODY MASS INDEX: 27.92 KG/M2

## 2022-05-18 DIAGNOSIS — E11.621 DIABETIC ULCER OF LEFT HEEL ASSOCIATED WITH TYPE 2 DIABETES MELLITUS, WITH NECROSIS OF BONE (HCC): Primary | ICD-10-CM

## 2022-05-18 DIAGNOSIS — I70.244 ATHEROSCLEROSIS OF NATIVE ARTERY OF LEFT LOWER EXTREMITY WITH ULCERATION OF HEEL (HCC): ICD-10-CM

## 2022-05-18 DIAGNOSIS — L97.424 DIABETIC ULCER OF LEFT HEEL ASSOCIATED WITH TYPE 2 DIABETES MELLITUS, WITH NECROSIS OF BONE (HCC): Primary | ICD-10-CM

## 2022-05-18 DIAGNOSIS — E11.628 DIABETIC FOOT INFECTION (HCC): ICD-10-CM

## 2022-05-18 DIAGNOSIS — L08.9 DIABETIC FOOT INFECTION (HCC): ICD-10-CM

## 2022-05-18 PROCEDURE — 99213 OFFICE O/P EST LOW 20 MIN: CPT

## 2022-05-18 PROCEDURE — 99214 OFFICE O/P EST MOD 30 MIN: CPT | Performed by: SURGERY

## 2022-05-18 PROCEDURE — 6370000000 HC RX 637 (ALT 250 FOR IP): Performed by: SURGERY

## 2022-05-18 RX ORDER — LIDOCAINE HYDROCHLORIDE 20 MG/ML
JELLY TOPICAL ONCE
Status: CANCELLED | OUTPATIENT
Start: 2022-05-18 | End: 2022-05-18

## 2022-05-18 RX ORDER — LIDOCAINE 50 MG/G
OINTMENT TOPICAL ONCE
Status: CANCELLED | OUTPATIENT
Start: 2022-05-18 | End: 2022-05-18

## 2022-05-18 RX ORDER — CLOBETASOL PROPIONATE 0.5 MG/G
OINTMENT TOPICAL ONCE
Status: CANCELLED | OUTPATIENT
Start: 2022-05-18 | End: 2022-05-18

## 2022-05-18 RX ORDER — GINSENG 100 MG
CAPSULE ORAL ONCE
Status: CANCELLED | OUTPATIENT
Start: 2022-05-18 | End: 2022-05-18

## 2022-05-18 RX ORDER — BACITRACIN, NEOMYCIN, POLYMYXIN B 400; 3.5; 5 [USP'U]/G; MG/G; [USP'U]/G
OINTMENT TOPICAL ONCE
Status: CANCELLED | OUTPATIENT
Start: 2022-05-18 | End: 2022-05-18

## 2022-05-18 RX ORDER — BETAMETHASONE DIPROPIONATE 0.05 %
OINTMENT (GRAM) TOPICAL ONCE
Status: CANCELLED | OUTPATIENT
Start: 2022-05-18 | End: 2022-05-18

## 2022-05-18 RX ORDER — LIDOCAINE HYDROCHLORIDE 40 MG/ML
SOLUTION TOPICAL ONCE
Status: CANCELLED | OUTPATIENT
Start: 2022-05-18 | End: 2022-05-18

## 2022-05-18 RX ORDER — LIDOCAINE HYDROCHLORIDE 40 MG/ML
SOLUTION TOPICAL ONCE
Status: COMPLETED | OUTPATIENT
Start: 2022-05-18 | End: 2022-05-18

## 2022-05-18 RX ORDER — LIDOCAINE 40 MG/G
CREAM TOPICAL ONCE
Status: CANCELLED | OUTPATIENT
Start: 2022-05-18 | End: 2022-05-18

## 2022-05-18 RX ORDER — GENTAMICIN SULFATE 1 MG/G
OINTMENT TOPICAL ONCE
Status: CANCELLED | OUTPATIENT
Start: 2022-05-18 | End: 2022-05-18

## 2022-05-18 RX ORDER — BACITRACIN ZINC AND POLYMYXIN B SULFATE 500; 1000 [USP'U]/G; [USP'U]/G
OINTMENT TOPICAL ONCE
Status: CANCELLED | OUTPATIENT
Start: 2022-05-18 | End: 2022-05-18

## 2022-05-18 RX ADMIN — LIDOCAINE HYDROCHLORIDE 5 ML: 40 SOLUTION TOPICAL at 13:31

## 2022-05-18 NOTE — PLAN OF CARE
Problem: Wound:  Goal: Will show signs of wound healing; wound closure and no evidence of infection  Description: Will show signs of wound healing; wound closure and no evidence of infection  Outcome: Progressing     Problem: Blood Glucose:  Goal: Ability to maintain appropriate glucose levels will improve  Description: Ability to maintain appropriate glucose levels will improve  Outcome: Progressing

## 2022-05-18 NOTE — PROGRESS NOTES
Wound Healing Center Followup Visit Note    Referring Physician : Shaina Romero MD  [id number] [facility] RECORD NUMBER:  [id number]  AGE: 57 y.o. GENDER: male  : 1960  EPISODE DATE:  2022    Subjective:     Chief Complaint   Patient presents with    Wound Check     left heel and leg wound      HISTORY of PRESENT ILLNESS HPI   Melva Collazo is a 57 y.o. male who presents today in regards to follow up evaluation and treatment of wound/ulcer. That patient's past medical, family and social hx were reviewed and changes were made if present. History of Wound Context:  The patient has had a wound of bilateral heels which was first noted approximately over a month ago. This has been treated with surgical debridement. On their initial visit to the wound healing center, 21 ,  the patient has noted that the wound has not been improving. The patient has had similar previous wounds in the past.       Pt is not on abx at time of initial visit.     3/22/21 - Wound VAC MWF continue to left heel. Aquacel Ag to right heel. Debrided toenails 1-5 in thickness and length. FU 1 week. IV Abx per Dr. Hernandez Arceo.     21 - Wound VAC MWF to left heel. Cultures obtained. FU 1 week after visit with Dr. Hernandez Arceo. PO Abx.  21 - DC wound VAC to left heel. Alginate to wound. FU 1 week  21 - Alginate and gentamicin ointment QOD. Partial WB to heel at 50% to foot with surgical shoe and walker.     5/3/21 - Alginate and gentamicin ointment QOD. MRI left heel     5/10/21 - Alginate and gentamicin ointment QOD. MRI left heel pending.     21 - Alginate and gentamicin ointment QOD. MRI reviewed confirming OM. Discussed HBOT referral and partial calcanectomy. He opts for HBOT consultation.     21 - Alginate and add gentamicin ointment QD. HBOT referral.  Patient hold off on partial calcanectomy. FU 1 week     21 - Alginate and add gentamicin ointment QD.   HBOT referral. Patient hold off on partial calcanectomy. FU 1 week     6/28/21 - Alginate and add gentamicin ointment QD. HBOT referral.  Patient hold off on partial calcanectomy. FU 1 week. Patient gave me consent (verbal) to speak with his brother [facility] regarding his at home arrangement. HBOT is on hold until patient is at home.     7/12/21 -  Alginate and add gentamicin ointment QD. HBOT referral.  Patient considering partial calcanectomy with flap. FU 1 week.      7/19/21 - Alginate and add gentamicin ointment QD. HBOT referral.  Patient considering partial calcanectomy with flap. FU 1 week.      8/2/21 - Alginate and add gentamicin ointment QD. HBOT referral.  Patient considering partial calcanectomy with flap. FU 1 week.      8/16/21 - Alginate and add gentamicin ointment QD. HBOT referral.  Patient considering partial calcanectomy with flap. FU 1 week.      8/23/21 - Alginate and add gentamicin ointment QD. HBOT referral.  Patient considering partial calcanectomy with flap. FU 1 week.      8/30/21 - Alginate and add gentamicin ointment QD. HBOT referral.  Patient considering partial calcanectomy with flap. FU 1 week.      9/20/21 - Alginate and add gentamicin ointment QD. HBOT referral.  Patient considering partial calcanectomy with flap. FU 1 week.      10/25/21 Alginate and add gentamicin ointment QD. HBOT referral.  Patient considering partial calcanectomy with flap. FU 1 week.      11/1/21 Alginate and add gentamicin ointment QD. HBOT referral.  Patient considering partial calcanectomy with flap. FU 1 week.      11/8/21 - Alginate and add gentamicin ointment QD. HBOT referral.  Patient considering partial calcanectomy with flap. FU 1 week.      11/15/21 - Alginate and add gentamicin ointment QD. HBOT referral.  Patient considering partial calcanectomy with flap. FU 1 week.      11/22/21 -  Alginate and add gentamicin ointment QD.   HBOT referral.  Patient considering partial calcanectomy with flap.  FU 1 week.      11/29/21 0 Alginate and gentamicin ointment. Possible DC home. HBOT referral.  Patient considering partial calcanectomy with flap. FU 1 week.      12/6/21 - Alginate and gentamicin ointment. Possible DC home. HBOT referral.  Patient considering partial calcanectomy with flap. FU 1 week.      12/13/21 - Alginate and gentamicin ointment. DC home. HBOT referral.  [facility]  referral for wound care and PT for GAIT training and safe DME use. Patient considering partial calcanectomy with flap. FU 1 week.      12/20/21 -  Alginate and gentamicin ointment. DC home. HBOT referral.     12/27/21 - HBOT consultation complete - to start HBOT after new year. 2-14-22 patient presents today for evaluation of a left heel wound. Patient transferred here to [facility] due to the fact that he is doing HBO, for convenience due to transportation. He was prior following with Dr. Natalia Yeung. Patient overall doing well, no complaints. Tolerating dressings as well as hyperbarics. Physical exam demonstrates vascular intact. Wound appreciated posterior aspect with areas of devitalized nonviable tissue with beefy tissue noted as well. There is no purulence, odor, erythema or increase in temperature. Currently no exposed bone. 2-24-22  Patient overall doing well, no complaints. Tolerating dressings as well as hyperbarics. Physical exam demonstrates vascular intact. Wound appreciated posterior aspect with areas of devitalized nonviable tissue with beefy tissue noted as well. There is no purulence, odor, erythema or increase in temperature. 3-3-22 presents in follow-up. Relates dark area on his left great toenail as well as \"swelling\"  Left lower leg that he noticed recently. Patient denies any known trauma. Patient currently takes aspirin as well as Plavix. Physical exam demonstrates vascular intact.   Wound appreciated posterior aspect with areas of devitalized nonviable tissue with beefy tissue noted as well. There is no purulence, odor, erythema or increase in temperature. Left great toenail proximal lateral small area, subungual hematoma, stable. 2 fluctuant areas adjacent left lower leg anterior lateral aspect. There is no erythema or increase in temperature. After oral consent under sterile technique and utilizing 1% lidocaine plain, 21-gauge needle as well as 11 blade stab incision expressing approximately 20 cc of hematoma formation, this was cultured. Monitor closely if he notices any recurrence recommend Radha Bradshaw  for further evaluation    3-28-22 last seen March 3rd, transportation issues. Physical exam, vascular intact. Wound appreciated posterior aspect left heel with areas of devitalized nonviable tissue, increasing/seen. There is no purulence, odor, erythema or increase in temperature. Change treatment to Santyl. Patient would benefit from continued HBO.    4-4-22 Physical exam, vascular intact. Wound appreciated posterior aspect left heel with areas of devitalized nonviable tissue w/ areas of beefy tissue. There is no purulence, odor, erythema or increase in temperature. Santyl. HBO. Improved at present. 4-11-22 Wound appreciated posterior aspect left heel with areas of devitalized nonviable tissue w/ areas of beefy tissue. There is no purulence, odor, erythema or increase in temperature. Santyl. HBO. 4-18-22 Wound appreciated posterior aspect left heel with areas of devitalized nonviable tissue w/ areas of beefy tissue. There is no purulence, odor, erythema or increase in temperature. Santyl. HBO. Discussed possible graft in near future, will get updated vasc studies.     5/9/2022  · Patient did not come last 2 weeks and also did not go for hyperbaric, last week, because of transportation problems  · Patient tells me at one time his podiatrist Carmen Heredia informed him that he may consider skin grafting of the wound  · On today's examination, I can literally feel the bone, but looks fairly clean  · Patient is trying to continue offloading the heel ulcer, and also continuing the hyperbaric oxygen therapy treatments  · The last lower extremity artery Doppler study done in 2021 revealed almost triphasic right ankle Doppler tracings and biphasic left ankle Doppler tracings with adequate flow to the heel and foot for potential tissue healing  · Patient was rescheduled for repeat proximal artery Doppler study by his podiatrist, not done yet I do not see it in the epic  · Inform the patient that last week I discussed with Dr. Vic Key regarding continuing the hyperbaric oxygen therapy, possibility of skin grafting  · Discussed with patient and nursing staff, please schedule appointment for the patient to see Dr. Vic Key for his input pending return to have his podiatrist back to the wound care center for further management  ·  we will make additional recommendations once the lower extremity artery Doppler studies completed  · All his questions were answered  5/18/22  · Concern that arterial flow is not adequate based off exam today  · Left dp weakly biphasic, PT monophasic  · Will hold hbo for now - reevaluate post angio  · Discussed angiogram - he had previous with Dr Josiah Walters which addressed his left sfa, pop with plasty using 6x250 DCB impact  · Concern that tibial disease is the primary issue vs. Previous intervention has shut down     Wound/Ulcer Pain Timing/Severity: none  Quality of pain: N/A  Severity:  0 / 10   Modifying Factors: None  Associated Signs/Symptoms: none     Ulcer Identification:  Ulcer Type: arterial and diabetic  Contributing Factors: diabetes     Diabetic/Pressure/Non Pressure Ulcers onl y:        PAST MEDICAL HISTORY      Diagnosis Date    Cerebral artery occlusion with cerebral infarction ([facility]     Diabetes mellitus ([facility]     Type 2    Diabetic foot ulcer with osteomyelitis ([facility] 12/21/2021    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 2 diabetes mellitus, with necrosis of bone ([address] 12/21/2021    Hemiparesis affecting left side as late effect of cerebrovascular accident ([address]     LEFT SIDE NON DOMINANT FOLLOWING STROKE    Hypertension     Peripheral vascular angioplasty status 12/21/2021    Ulcer of left heel, with necrosis of bone ([address] 12/27/2021    Ulcer of left heel, with necrosis of muscle ([address] 12/21/2021     Past Surgical History:   Procedure Laterality Date    FINGER AMPUTATION      FOOT DEBRIDEMENT Left 2/16/2021    LEFT FOOT DEBRIDEMENT WITH BONE BIOPSY, WOUND VAC APPLICATION performed by Pavan Power DPM at [place] [address] ARTHROSCOPY      TONSILLECTOMY      TRANSESOPHAGEAL ECHOCARDIOGRAM N/A 2/22/2021    TRANSESOPHAGEAL ECHOCARDIOGRAM WITH BUBBLE STUDY performed by Corazon Agarwal MD at [facility] N/A 1/4/2021    EGD BIOPSY performed by Tommie Horan DO at [place] [id number] reviewed. No pertinent family history. Social History     Tobacco Use    Smoking status: Former Smoker    Smokeless tobacco: Never Used   Vaping Use    Vaping Use: Never used   Substance Use Topics    Alcohol use: Not Currently     Comment: Former every day drinker for most of adult life    Drug use: No     Allergies   Allergen Reactions    Pcn [Penicillins]      Current Outpatient Medications on File Prior to Encounter   Medication Sig Dispense Refill    sulfamethoxazole-trimethoprim (BACTRIM DS;SEPTRA DS) 800-160 MG per tablet Take 1 tablet by mouth daily for 30 doses 30 tablet 0    cyclobenzaprine (FLEXERIL) 5 MG tablet Take 1 tablet by mouth nightly as needed for Muscle spasms 30 tablet 2    diphenhydrAMINE (BENADRYL ALLERGY) 25 MG capsule Take 25 mg by mouth every 6 hours as needed for Itching      gabapentin (NEURONTIN) 100 MG capsule Take 100 mg by mouth 2 times daily.        B Complex-C-E-Zn (STRESS B/ZINC) TABS Take 1 tablet by mouth daily      acetaminophen (TYLENOL) 325 MG tablet Take 650 mg by mouth every 6 hours as needed for Pain      ferrous sulfate (IRON 325) 325 (65 Fe) MG tablet Take 325 mg by mouth daily (with breakfast)      vitamin D (ERGOCALCIFEROL) 1.25 MG (40886 UT) CAPS capsule Take 1 capsule by mouth once a week 5 capsule 0    melatonin 3 MG TABS tablet Take 9 mg by mouth nightly as needed      aspirin 81 MG EC tablet Take 81 mg by mouth daily      hydroCHLOROthiazide (HYDRODIURIL) 25 MG tablet Take 25 mg by mouth daily      metFORMIN (GLUCOPHAGE) 500 MG tablet Take 1 tablet by mouth 2 times daily (with meals) 60 tablet 3    atorvastatin (LIPITOR) 40 MG tablet Take 1 tablet by mouth nightly 30 tablet 3    clopidogrel (PLAVIX) 75 MG tablet Take 1 tablet by mouth daily 30 tablet 3     No current facility-administered medications on file prior to encounter. REVIEW OF SYSTEMS See HPI    Objective:    /76   Pulse (!) 6   Temp 98.2 °F (36.8 °C) (Temporal)   Resp 16   Ht 5' 10\" (1.778 m)   Wt 195 lb (88.5 kg)   BMI 27.98 kg/m²   Wt Readings from Last 3 Encounters:   05/18/22 195 lb (88.5 kg)   05/09/22 195 lb (88.5 kg)   04/18/22 195 lb (88.5 kg)     PHYSICAL EXAM  CONSTITUTIONAL:   Awake, alert, cooperative   EYES:  lids and lashes normal   ENT: external ears and nose without lesions   NECK:  supple, symmetrical, trachea midline   SKIN:  Open wound     Assessment:     DIABETIC ULCER.     Pre Debridement Measurements:  Are located in the [facility]  Documentation Flow Sheet  Post Debridement Measurements:  Wound/Ulcer Descriptions are Pre Debridement except measurements:     Wound 12/26/20 Arm Left (Active)   Number of days: 507       Wound 02/15/21 Heel Left (Active)   Number of days: 457       Wound 02/15/21 Heel Right (Active)   Number of days: 457       Wound 03/08/21 Heel Left #1 left heel aquired 12/1/20 (Active)   Wound Image   05/12/22 1306   Wound Etiology Diabetic 02/14/22 1344   Dressing Status New dressing applied;Clean;Dry; Intact 05/09/22 1405   Wound Cleansed Cleansed with saline 05/09/22 1405   Dressing/Treatment Alginate;Dry dressing 05/09/22 1405   Offloading for Diabetic Foot Ulcers Post op shoe 05/09/22 1405   Wound Length (cm) 4.3 cm 05/18/22 1327   Wound Width (cm) 2.7 cm 05/18/22 1327   Wound Depth (cm) 0.7 cm 05/18/22 1327   Wound Surface Area (cm^2) 11.61 cm^2 05/18/22 1327   Change in Wound Size % (l*w) 3.97 05/18/22 1327   Wound Volume (cm^3) 8.127 cm^3 05/18/22 1327   Wound Healing % 4 05/18/22 1327   Post-Procedure Length (cm) 4.4 cm 05/09/22 1351   Post-Procedure Width (cm) 3.3 cm 05/09/22 1351   Post-Procedure Depth (cm) 0.5 cm 05/09/22 1351   Post-Procedure Surface Area (cm^2) 14.52 cm^2 05/09/22 1351   Post-Procedure Volume (cm^3) 7.26 cm^3 05/09/22 1351   Undermining Starts ___ O'Clock 6 04/18/22 1315   Undermining Ends___ O'Clock 12 04/18/22 1315   Undermining Maxium Distance (cm) [email or web address] 04/18/22 1315   Wound Assessment Fibrin;Pale granulation tissue; Exposed structure bone 05/18/22 1327   Drainage Amount Moderate 05/18/22 1327   Drainage Description Serosanguinous; Yellow 05/18/22 1327   Odor None 05/18/22 1327   Adrianne-wound Assessment Intact 05/18/22 1327   Number of days: 436       Wound 03/08/21 Heel Right #2 rt heel aquired 12/1/20 (Active)   Wound Image   04/05/21 0929   Dressing Status New dressing applied 03/18/21 1352   Wound Cleansed Cleansed with saline 04/01/21 1536   Dressing/Treatment Alginate;Collagen;Silicone border 45/47/65 1536   Offloading for Diabetic Foot Ulcers Post op shoe 04/01/21 1536   Wound Length (cm) 0 cm 04/05/21 0929   Wound Width (cm) 0 cm 04/05/21 0929   Wound Depth (cm) 0 cm 04/05/21 0929   Wound Surface Area (cm^2) 0 cm^2 04/05/21 0929   Change in Wound Size % (l*w) 100 04/05/21 0929   Wound Volume (cm^3) 0 cm^3 04/05/21 0929   Wound Healing % 100 04/05/21 0929   Post-Procedure Length (cm) 0 cm 04/05/21 1011   Post-Procedure Width (cm) 0 cm 04/05/21 1011 Post-Procedure Depth (cm) 0 cm 04/05/21 1011   Post-Procedure Surface Area (cm^2) 0 cm^2 04/05/21 1011   Post-Procedure Volume (cm^3) 0 cm^3 04/05/21 1011   Wound Assessment Fibrin 04/01/21 1357   Drainage Amount None 04/01/21 1357   Drainage Description Yellow 03/22/21 0911   Odor None 04/01/21 1357   Adrianne-wound Assessment Maceration 04/01/21 1357   Number of days: 436       Wound 05/12/22 Pretibial Left #2 (Active)   Wound Image   05/12/22 1306   Wound Etiology Skin Tear 05/12/22 1306   Wound Length (cm) 1 cm 05/18/22 1327   Wound Width (cm) 1.3 cm 05/18/22 1327   Wound Depth (cm) 0.1 cm 05/18/22 1327   Wound Surface Area (cm^2) 1.3 cm^2 05/18/22 1327   Change in Wound Size % (l*w) 16.67 05/18/22 1327   Wound Volume (cm^3) 0.13 cm^3 05/18/22 1327   Wound Healing % 17 05/18/22 1327   Wound Assessment Fibrin 05/18/22 1327   Drainage Amount Small 05/18/22 1327   Drainage Description Serous 05/18/22 1327   Odor None 05/18/22 1327   Adrianne-wound Assessment Fragile 05/18/22 1327   Number of days: 6     Incision 02/19/21 Groin Right (Active)   Number of days: 453     No debridement    Plan:   Treatment Note please see attached Discharge Instructions    Written patient dismissal instructions given to patient and signed by patient or POA. Discharge Instructions       Visit Discharge/Physician Orders     Assessment of pain at discharge: moderate     Anesthetic used: 4% liquid lidocaine solution      Discharge to:      Left via:ambulance     Accompanied by: self     ECF[facility]: [facility]      Dressing Orders:  Cleanse left heel ulcer with normal saline solution, apply plain alginate and dry dressing and change every day. Treatment Orders:   Wear prevalon boots or heel ayo while in bed.       May be partial weight bearing up to 50% on left with surgical shoe.     Dr. Amara Osborne office to contact for angiogram      [facility] followup visit: ______________ 1 week Dr MENDOZA______________  (Please note your next appointment above and if you are unable to keep, kindly give a 24 hour notice. Thank you.)     Physician signature:__________________________        If you experience any of the following, please call the [facility] during business hours:     * Increase in Pain  * Temperature over 101  * Increase in drainage from your wound  * Drainage with a foul odor  * Bleeding  * Increase in swelling  * Need for compression bandage changes due to slippage, breakthrough drainage.     If you need medical attention outside of the business hours of the [facility] please contact your PCP or go to the nearest emergency room.         Electronically signed by Dayan Bradford MD on 5/18/2022 at 2:32 PM

## 2022-05-19 ENCOUNTER — TELEPHONE (OUTPATIENT)
Dept: VASCULAR SURGERY | Age: 62
End: 2022-05-19

## 2022-05-19 NOTE — TELEPHONE ENCOUNTER
Scheduled abdominal aortogram possible intervention 5/31/22 at 2:30 pm.  Pt was notified and instructed to report to [address] floor registration at 12:30 pm, NPO after midnight except heart and/or BP meds in a.m with sips of water, hold Metformin day of and 2 days after, must have a .

## 2022-05-25 ENCOUNTER — TELEPHONE (OUTPATIENT)
Dept: VASCULAR SURGERY | Age: 62
End: 2022-05-25

## 2022-05-25 ENCOUNTER — HOSPITAL ENCOUNTER (OUTPATIENT)
Dept: WOUND CARE | Age: 62
Discharge: HOME | End: 2022-05-25
Payer: COMMERCIAL

## 2022-05-25 VITALS — TEMPERATURE: 99 F

## 2022-05-25 DIAGNOSIS — L97.424 DIABETIC ULCER OF LEFT HEEL ASSOCIATED WITH TYPE 2 DIABETES MELLITUS, WITH NECROSIS OF BONE (HCC): Primary | ICD-10-CM

## 2022-05-25 DIAGNOSIS — I70.244 ATHEROSCLEROSIS OF NATIVE ARTERY OF LEFT LOWER EXTREMITY WITH ULCERATION OF HEEL (HCC): ICD-10-CM

## 2022-05-25 DIAGNOSIS — L08.9 DIABETIC FOOT INFECTION (HCC): ICD-10-CM

## 2022-05-25 DIAGNOSIS — E11.628 DIABETIC FOOT INFECTION (HCC): ICD-10-CM

## 2022-05-25 DIAGNOSIS — E11.621 DIABETIC ULCER OF LEFT HEEL ASSOCIATED WITH TYPE 2 DIABETES MELLITUS, WITH NECROSIS OF BONE (HCC): Primary | ICD-10-CM

## 2022-05-25 PROCEDURE — 6370000000 HC RX 637 (ALT 250 FOR IP): Performed by: SURGERY

## 2022-05-25 PROCEDURE — 11042 DBRDMT SUBQ TIS 1ST 20SQCM/<: CPT | Performed by: SURGERY

## 2022-05-25 PROCEDURE — 11042 DBRDMT SUBQ TIS 1ST 20SQCM/<: CPT

## 2022-05-25 RX ORDER — BETAMETHASONE DIPROPIONATE 0.05 %
OINTMENT (GRAM) TOPICAL ONCE
Status: CANCELLED | OUTPATIENT
Start: 2022-05-25 | End: 2022-05-25

## 2022-05-25 RX ORDER — TRAMADOL HYDROCHLORIDE 50 MG/1
50 TABLET ORAL 2 TIMES DAILY
COMMUNITY
End: 2022-08-09 | Stop reason: SDUPTHER

## 2022-05-25 RX ORDER — LIDOCAINE HYDROCHLORIDE 20 MG/ML
JELLY TOPICAL ONCE
Status: CANCELLED | OUTPATIENT
Start: 2022-05-25 | End: 2022-05-25

## 2022-05-25 RX ORDER — LIDOCAINE HYDROCHLORIDE 40 MG/ML
SOLUTION TOPICAL ONCE
Status: CANCELLED | OUTPATIENT
Start: 2022-05-25 | End: 2022-05-25

## 2022-05-25 RX ORDER — BACITRACIN ZINC AND POLYMYXIN B SULFATE 500; 1000 [USP'U]/G; [USP'U]/G
OINTMENT TOPICAL ONCE
Status: CANCELLED | OUTPATIENT
Start: 2022-05-25 | End: 2022-05-25

## 2022-05-25 RX ORDER — GENTAMICIN SULFATE 1 MG/G
OINTMENT TOPICAL ONCE
Status: CANCELLED | OUTPATIENT
Start: 2022-05-25 | End: 2022-05-25

## 2022-05-25 RX ORDER — CLOBETASOL PROPIONATE 0.5 MG/G
OINTMENT TOPICAL ONCE
Status: CANCELLED | OUTPATIENT
Start: 2022-05-25 | End: 2022-05-25

## 2022-05-25 RX ORDER — BACITRACIN, NEOMYCIN, POLYMYXIN B 400; 3.5; 5 [USP'U]/G; MG/G; [USP'U]/G
OINTMENT TOPICAL ONCE
Status: CANCELLED | OUTPATIENT
Start: 2022-05-25 | End: 2022-05-25

## 2022-05-25 RX ORDER — LIDOCAINE 40 MG/G
CREAM TOPICAL ONCE
Status: CANCELLED | OUTPATIENT
Start: 2022-05-25 | End: 2022-05-25

## 2022-05-25 RX ORDER — LIDOCAINE 50 MG/G
OINTMENT TOPICAL ONCE
Status: CANCELLED | OUTPATIENT
Start: 2022-05-25 | End: 2022-05-25

## 2022-05-25 RX ORDER — LIDOCAINE HYDROCHLORIDE 40 MG/ML
SOLUTION TOPICAL ONCE
Status: COMPLETED | OUTPATIENT
Start: 2022-05-25 | End: 2022-05-25

## 2022-05-25 RX ORDER — GINSENG 100 MG
CAPSULE ORAL ONCE
Status: CANCELLED | OUTPATIENT
Start: 2022-05-25 | End: 2022-05-25

## 2022-05-25 RX ADMIN — LIDOCAINE HYDROCHLORIDE 4 ML: 40 SOLUTION TOPICAL at 13:35

## 2022-05-26 ENCOUNTER — HOSPITAL ENCOUNTER (OUTPATIENT)
Dept: WOUND CARE | Age: 62
Discharge: HOME | End: 2022-05-26

## 2022-05-26 NOTE — PROGRESS NOTES
Wound Healing Center Followup Visit Note    Referring Physician : John Medina MD  [id number] [facility] RECORD NUMBER:  [id number]  AGE: 57 y.o. GENDER: male  : 1960  EPISODE DATE:  2022    Subjective:     Chief Complaint   Patient presents with    Wound Check     left heel/leg      HISTORY of PRESENT ILLNESS HPI   Guadalupe Saure is a 57 y.o. male who presents today in regards to follow up evaluation and treatment of wound/ulcer. That patient's past medical, family and social hx were reviewed and changes were made if present. History of Wound Context:  The patient has had a wound of bilateral heels which was first noted approximately over a month ago. This has been treated with surgical debridement. On their initial visit to the wound healing center, 21 ,  the patient has noted that the wound has not been improving. The patient has had similar previous wounds in the past.       Pt is not on abx at time of initial visit.     3/22/21 - Wound VAC MWF continue to left heel. Aquacel Ag to right heel. Debrided toenails 1-5 in thickness and length. FU 1 week. IV Abx per Dr. Felicity Bautista.     21 - Wound VAC MWF to left heel. Cultures obtained. FU 1 week after visit with Dr. Felicity Bautista. PO Abx.  21 - DC wound VAC to left heel. Alginate to wound. FU 1 week  21 - Alginate and gentamicin ointment QOD. Partial WB to heel at 50% to foot with surgical shoe and walker.     5/3/21 - Alginate and gentamicin ointment QOD. MRI left heel     5/10/21 - Alginate and gentamicin ointment QOD. MRI left heel pending.     21 - Alginate and gentamicin ointment QOD. MRI reviewed confirming OM. Discussed HBOT referral and partial calcanectomy. He opts for HBOT consultation.     21 - Alginate and add gentamicin ointment QD. HBOT referral.  Patient hold off on partial calcanectomy. FU 1 week     21 - Alginate and add gentamicin ointment QD.   HBOT referral.  Patient hold off on partial calcanectomy. FU 1 week     6/28/21 - Alginate and add gentamicin ointment QD. HBOT referral.  Patient hold off on partial calcanectomy. FU 1 week. Patient gave me consent (verbal) to speak with his brother Titi Skinner regarding his at home arrangement. HBOT is on hold until patient is at home.     7/12/21 -  Alginate and add gentamicin ointment QD. HBOT referral.  Patient considering partial calcanectomy with flap. FU 1 week.      7/19/21 - Alginate and add gentamicin ointment QD. HBOT referral.  Patient considering partial calcanectomy with flap. FU 1 week.      8/2/21 - Alginate and add gentamicin ointment QD. HBOT referral.  Patient considering partial calcanectomy with flap. FU 1 week.      8/16/21 - Alginate and add gentamicin ointment QD. HBOT referral.  Patient considering partial calcanectomy with flap. FU 1 week.      8/23/21 - Alginate and add gentamicin ointment QD. HBOT referral.  Patient considering partial calcanectomy with flap. FU 1 week.      8/30/21 - Alginate and add gentamicin ointment QD. HBOT referral.  Patient considering partial calcanectomy with flap. FU 1 week.      9/20/21 - Alginate and add gentamicin ointment QD. HBOT referral.  Patient considering partial calcanectomy with flap. FU 1 week.      10/25/21 Alginate and add gentamicin ointment QD. HBOT referral.  Patient considering partial calcanectomy with flap. FU 1 week.      11/1/21 Alginate and add gentamicin ointment QD. HBOT referral.  Patient considering partial calcanectomy with flap. FU 1 week.      11/8/21 - Alginate and add gentamicin ointment QD. HBOT referral.  Patient considering partial calcanectomy with flap. FU 1 week.      11/15/21 - Alginate and add gentamicin ointment QD. HBOT referral.  Patient considering partial calcanectomy with flap. FU 1 week.      11/22/21 -  Alginate and add gentamicin ointment QD. HBOT referral.  Patient considering partial calcanectomy with flap.   FU 1 week.      11/29/21 0 Alginate and gentamicin ointment. Possible DC home. HBOT referral.  Patient considering partial calcanectomy with flap. FU 1 week.      12/6/21 - Alginate and gentamicin ointment. Possible DC home. HBOT referral.  Patient considering partial calcanectomy with flap. FU 1 week.      12/13/21 - Alginate and gentamicin ointment. DC home. HBOT referral.  [facility] referral for wound care and PT for GAIT training and safe DME use. Patient considering partial calcanectomy with flap. FU 1 week.      12/20/21 -  Alginate and gentamicin ointment. DC home. HBOT referral.     12/27/21 - HBOT consultation complete - to start HBOT after new year. 2-14-22 patient presents today for evaluation of a left heel wound. Patient transferred here to [facility] care [facility] due to the fact that he is doing HBO, for convenience due to transportation. He was prior following with Dr. Eileen Gibson. Patient overall doing well, no complaints. Tolerating dressings as well as hyperbarics. Physical exam demonstrates vascular intact. Wound appreciated posterior aspect with areas of devitalized nonviable tissue with beefy tissue noted as well. There is no purulence, odor, erythema or increase in temperature. Currently no exposed bone. 2-24-22  Patient overall doing well, no complaints. Tolerating dressings as well as hyperbarics. Physical exam demonstrates vascular intact. Wound appreciated posterior aspect with areas of devitalized nonviable tissue with beefy tissue noted as well. There is no purulence, odor, erythema or increase in temperature. 3-3-22 presents in follow-up. Relates dark area on his left great toenail as well as \"swelling\"  Left lower leg that he noticed recently. Patient denies any known trauma. Patient currently takes aspirin as well as Plavix. Physical exam demonstrates vascular intact.   Wound appreciated posterior aspect with areas of devitalized nonviable tissue with beefy tissue noted as well. There is no purulence, odor, erythema or increase in temperature. Left great toenail proximal lateral small area, subungual hematoma, stable. 2 fluctuant areas adjacent left lower leg anterior lateral aspect. There is no erythema or increase in temperature. After oral consent under sterile technique and utilizing 1% lidocaine plain, 21-gauge needle as well as 11 blade stab incision expressing approximately 20 cc of hematoma formation, this was cultured. Monitor closely if he notices any recurrence recommend Kisha Szymanski  for further evaluation    3-28-22 last seen March 3rd, transportation issues. Physical exam, vascular intact. Wound appreciated posterior aspect left heel with areas of devitalized nonviable tissue, increasing/seen. There is no purulence, odor, erythema or increase in temperature. Change treatment to Santyl. Patient would benefit from continued HBO.    4-4-22 Physical exam, vascular intact. Wound appreciated posterior aspect left heel with areas of devitalized nonviable tissue w/ areas of beefy tissue. There is no purulence, odor, erythema or increase in temperature. Santyl. HBO. Improved at present. 4-11-22 Wound appreciated posterior aspect left heel with areas of devitalized nonviable tissue w/ areas of beefy tissue. There is no purulence, odor, erythema or increase in temperature. Santyl. HBO. 4-18-22 Wound appreciated posterior aspect left heel with areas of devitalized nonviable tissue w/ areas of beefy tissue. There is no purulence, odor, erythema or increase in temperature. Santyl. HBO. Discussed possible graft in near future, will get updated vasc studies.     5/9/2022  · Patient did not come last 2 weeks and also did not go for hyperbaric, last week, because of transportation problems  · Patient tells me at one time his podiatrist Sofya Zhang informed him that he may consider skin grafting of the wound  · On today's examination, I can literally feel the bone, but looks fairly clean  · Patient is trying to continue offloading the heel ulcer, and also continuing the hyperbaric oxygen therapy treatments  · The last lower extremity artery Doppler study done in 2021 revealed almost triphasic right ankle Doppler tracings and biphasic left ankle Doppler tracings with adequate flow to the heel and foot for potential tissue healing  · Patient was rescheduled for repeat proximal artery Doppler study by his podiatrist, not done yet I do not see it in the epic  · Inform the patient that last week I discussed with Dr. Shoshana Lefort regarding continuing the hyperbaric oxygen therapy, possibility of skin grafting  · Discussed with patient and nursing staff, please schedule appointment for the patient to see Dr. Shoshana Lefort for his input pending return to have his podiatrist back to the wound care center for further management  ·  we will make additional recommendations once the lower extremity artery Doppler studies completed  · All his questions were answered  5/18/22  · Concern that arterial flow is not adequate based off exam today  · Left dp weakly biphasic, PT monophasic  · Will hold hbo for now - reevaluate post angio  · Discussed angiogram - he had previous with Dr Bisi Rivas which addressed his left sfa, pop with plasty using 6x250 DCB impact  · Concern that tibial disease is the primary issue vs. Previous intervention has shut down  5/25/22  · Angio 5/31  · Wound stable heel     Wound/Ulcer Pain Timing/Severity: none  Quality of pain: N/A  Severity:  0 / 10   Modifying Factors: None  Associated Signs/Symptoms: none     Ulcer Identification:  Ulcer Type: arterial and diabetic  Contributing Factors: diabetes     Diabetic/Pressure/Non Pressure Ulcers onl y:        PAST MEDICAL HISTORY      Diagnosis Date    Arthritis     [facility]erebral artery occlusion with [facility] infarction [facility]     Diabetes mellitus ([facility] [address]     Type 2    Diabetic foot ulcer with osteomyelitis ([address] 12/21/2021    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 2 diabetes mellitus, with necrosis of bone ([address] 12/21/2021    Hemiparesis affecting left side as late effect of cerebrovascular accident ([address]     LEFT SIDE NON DOMINANT FOLLOWING STROKE    Hypertension     Peripheral vascular angioplasty status 12/21/2021    Ulcer of left heel, with necrosis of bone ([address] 12/27/2021    Ulcer of left heel, with necrosis of muscle ([address] 12/21/2021     Past Surgical History:   Procedure Laterality Date    FINGER AMPUTATION      FOOT DEBRIDEMENT Left 2/16/2021    LEFT FOOT DEBRIDEMENT WITH BONE BIOPSY, WOUND VAC APPLICATION performed by Demetrio Woo DPM at [place] [address] ARTHROSCOPY      TONSILLECTOMY      TRANSESOPHAGEAL ECHOCARDIOGRAM N/A 2/22/2021    TRANSESOPHAGEAL ECHOCARDIOGRAM WITH BUBBLE STUDY performed [facility] MD [facility] N/A 1/4/2021    EGD BIOPSY performed by Blaise Koenig DO at [place] [id number] reviewed. No pertinent family history. Social History     Tobacco Use    Smoking status: Former Smoker    Smokeless tobacco: Never Used   Vaping Use    Vaping Use: Never used   Substance Use Topics    Alcohol use: Not Currently     Comment: Former every day drinker for most of adult life    Drug use: No     Allergies   Allergen Reactions    Pcn [Penicillins]      Current Outpatient Medications on File Prior to Encounter   Medication Sig Dispense Refill    traMADol (ULTRAM) 50 MG tablet Take 50 mg by mouth 2 times daily.       sulfamethoxazole-trimethoprim (BACTRIM DS;SEPTRA DS) 800-160 MG per tablet Take 1 tablet by mouth daily for 30 doses 30 tablet 0    cyclobenzaprine (FLEXERIL) 5 MG tablet Take 1 tablet by mouth nightly as needed for Muscle spasms 30 tablet 2    diphenhydrAMINE (BENADRYL ALLERGY) 25 MG capsule Take 25 mg by mouth every 6 hours as needed for Itching      gabapentin (NEURONTIN) 100 MG capsule Take 100 mg by mouth 2 times daily.  B Complex-C-E-Zn (STRESS B/ZINC) TABS Take 1 tablet by mouth daily      acetaminophen (TYLENOL) 325 MG tablet Take 650 mg by mouth every 6 hours as needed for Pain      ferrous sulfate (IRON 325) 325 (65 Fe) MG tablet Take 325 mg by mouth daily (with breakfast)      vitamin D (ERGOCALCIFEROL) 1.25 MG (55316 UT) CAPS capsule Take 1 capsule by mouth once a week 5 capsule 0    melatonin 3 MG TABS tablet Take 9 mg by mouth nightly as needed      aspirin 81 MG EC tablet Take 81 mg by mouth daily      hydroCHLOROthiazide (HYDRODIURIL) 25 MG tablet Take 25 mg by mouth daily      metFORMIN (GLUCOPHAGE) 500 MG tablet Take 1 tablet by mouth 2 times daily (with meals) 60 tablet 3    atorvastatin (LIPITOR) 40 MG tablet Take 1 tablet by mouth nightly 30 tablet 3    clopidogrel (PLAVIX) 75 MG tablet Take 1 tablet by mouth daily 30 tablet 3     No current facility-administered medications on file prior to encounter.        REVIEW OF SYSTEMS See HPI    Objective:    Temp 99 °F (37.2 °C)   Wt Readings from Last 3 Encounters:   05/18/22 195 lb (88.5 kg)   05/09/22 195 lb (88.5 kg)   04/18/22 195 lb (88.5 kg)     PHYSICAL EXAM  CONSTITUTIONAL:   Awake, alert, cooperative   EYES:  lids and lashes normal   ENT: external ears and nose without lesions   NECK:  supple, symmetrical, trachea midline   SKIN:  Open wound     Assessment:     Problem List Items Addressed This Visit     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 2 diabetes mellitus, with necrosis of bone ([address] - Primary (Chronic)    Relevant Orders    Initiate Outpatient Wound Care Protocol    Atherosclerosis of native artery of left lower extremity with ulceration of heel ([address]    Diabetic foot infection ([address]    Relevant Orders    Initiate Outpatient Wound Care Protocol          Pre Debridement Measurements:  Are located in the [facility]  Documentation Flow Sheet  Post Debridement Measurements:  Wound/Ulcer Descriptions are Pre Debridement except measurements:     Wound 12/26/20 Arm Left (Active)   Number of days: 515       Wound 02/15/21 Heel Left (Active)   Number of days: 464       Wound 02/15/21 Heel Right (Active)   Number of days: 464       Wound 03/08/21 Heel Left #1 left heel aquired 12/1/20 (Active)   Wound Image   05/12/22 1306   Wound Etiology Diabetic 02/14/22 1344   Dressing Status New dressing applied 05/25/22 1400   Wound Cleansed Cleansed with saline 05/25/22 1400   Dressing/Treatment Alginate;ABD;Dry dressing 05/25/22 1400   Offloading for Diabetic Foot Ulcers Post op shoe 05/25/22 1400   Wound Length (cm) 4.3 cm 05/25/22 1329   Wound Width (cm) 3.3 cm 05/25/22 1329   Wound Depth (cm) 0.6 cm 05/25/22 1329   Wound Surface Area (cm^2) 14.19 cm^2 05/25/22 1329   Change in Wound Size % (l*w) -17.37 05/25/22 1329   Wound Volume (cm^3) 8.514 cm^3 05/25/22 1329   Wound Healing % -1 05/25/22 1329   Post-Procedure Length (cm) 4.4 cm 05/25/22 1345   Post-Procedure Width (cm) 3.4 cm 05/25/22 1345   Post-Procedure Depth (cm) 0.6 cm 05/25/22 1345   Post-Procedure Surface Area (cm^2) 14.96 cm^2 05/25/22 1345   Post-Procedure Volume (cm^3) 8.976 cm^3 05/25/22 1345   Undermining Starts ___ O'Clock 9 05/25/22 1329   Undermining Ends___ O'Clock 3 05/25/22 1329   Undermining Maxium Distance (cm) [email or web address] 05/25/22 1329   Wound Assessment Fibrin;Pale granulation tissue; Exposed structure bone 05/25/22 1329   Drainage Amount Moderate 05/25/22 1329   Drainage Description Yellow 05/25/22 1329   Odor None 05/25/22 1329   Adrianne-wound Assessment Intact 05/25/22 1329   Number of days: 443       Wound 03/08/21 Heel Right #2 rt heel aquired 12/1/20 (Active)   Wound Image   04/05/21 0929   Dressing Status New dressing applied 03/18/21 1352   Wound Cleansed Cleansed with saline 04/01/21 1536   Dressing/Treatment Alginate;Collagen;Silicone border 15/40/53 1536   Offloading for Diabetic Foot Ulcers Post op shoe 04/01/21 1536   Wound Length (cm) 0 cm 04/05/21 0929   Wound Width (cm) 0 cm 04/05/21 0929   Wound Depth (cm) 0 cm 04/05/21 0929   Wound Surface Area (cm^2) 0 cm^2 04/05/21 0929   Change in Wound Size % (l*w) 100 04/05/21 0929   Wound Volume (cm^3) 0 cm^3 04/05/21 0929   Wound Healing % 100 04/05/21 0929   Post-Procedure Length (cm) 0 cm 04/05/21 1011   Post-Procedure Width (cm) 0 cm 04/05/21 1011   Post-Procedure Depth (cm) 0 cm 04/05/21 1011   Post-Procedure Surface Area (cm^2) 0 cm^2 04/05/21 1011   Post-Procedure Volume (cm^3) 0 cm^3 04/05/21 1011   Wound Assessment Fibrin 04/01/21 1357   Drainage Amount None 04/01/21 1357   Drainage Description Yellow 03/22/21 0911   Odor None 04/01/21 1357   Adrianne-wound Assessment Maceration 04/01/21 1357   Number of days: 443       Wound 05/12/22 Pretibial Left #2 (Active)   Wound Image   05/12/22 1306   Wound Etiology Skin Tear 05/12/22 1306   Dressing Status New dressing applied 05/25/22 1400   Wound Cleansed Cleansed with saline 05/25/22 1400   Dressing/Treatment Alginate;Dry dressing 05/25/22 1400   Offloading for Diabetic Foot Ulcers Offloading not required 05/25/22 1400   Wound Length (cm) 0.8 cm 05/25/22 1329   Wound Width (cm) 1.1 cm 05/25/22 1329   Wound Depth (cm) 0.1 cm 05/25/22 1329   Wound Surface Area (cm^2) 0.88 cm^2 05/25/22 1329   Change in Wound Size % (l*w) 43.59 05/25/22 1329   Wound Volume (cm^3) 0.088 cm^3 05/25/22 1329   Wound Healing % 44 05/25/22 1329   Post-Procedure Length (cm) 0.9 cm 05/25/22 1345   Post-Procedure Width (cm) 1.2 cm 05/25/22 1345   Post-Procedure Depth (cm) 0.1 cm 05/25/22 1345   Post-Procedure Surface Area (cm^2) 1.08 cm^2 05/25/22 1345   Post-Procedure Volume (cm^3) 0.108 cm^3 05/25/22 1345   Wound Assessment Fibrin 05/25/22 1329   Drainage Amount Small 05/25/22 1329   Drainage Description Serous 05/25/22 1329   Odor None 05/25/22 1329   Adrianne-wound Assessment Fragile 05/25/22 1329   Number of days: 13     Incision 02/19/21 Groin Right (Active)   Number of days: 460       Procedure Note  Indications:  Based on my examination of this patient's wound(s)/ulcer(s) today, debridement is required to promote healing and evaluate the wound base. Performed by: Wyatt Corrigan MD    Consent obtained:  Yes    Time out taken:  Yes    Pain Control:       Debridement:Excisional Debridement    Using curette the wound(s)/ulcer(s) was/were sharply debrided down through and including the removal of epidermis, dermis and subcutaneous tissue. Devitalized Tissue Debrided:  fibrin, biofilm, slough and exudate to stimulate bleeding to promote healing, post debridement good bleeding base and wound edges noted    Wound/Ulcer #: 1,2    Percent of Wound/Ulcer Debrided: 100%    Total Surface Area Debrided:  15 sq cm     Estimated Blood Loss:  Minimal  Hemostasis Achieved:  by pressure    Procedural Pain:  4  / 10   Post Procedural Pain:  2 / 10     Response to treatment:  Well tolerated by patient. A culture was not done. Plan:   Treatment Note please see attached Discharge Instructions    Written patient dismissal instructions given to patient and signed by patient or POA. Discharge Instructions       Visit Discharge/Physician Orders     Assessment of pain at discharge: moderate     Anesthetic used: 4% liquid lidocaine solution      Discharge to:      Left via:ambulance     Accompanied by: self     [facility]      Dressing Orders:  Cleanse left heel ulcer & Left pretib with normal saline solution, apply plain alginate and dry dressing and change every day.     Treatment Orders:   Wear prevalon boots or heel ayo while in bed.       May be partial weight bearing up to 50% on left with surgical shoe.     Angiogram Tuesday with Dr Nicol Sidhu     [facility] followup visit: ______________ 2 weeks Dr MENDOZA______________  (Please note your next appointment above and if you are unable to keep, kindly give a 24 hour notice.  Thank you.)     Physician signature:__________________________        If you experience any of the following, please call the [address] during business hours:     * Increase in Pain  * Temperature over 101  * Increase in drainage from your wound  * Drainage with a foul odor  * Bleeding  * Increase in swelling  * Need for compression bandage changes due to slippage, breakthrough drainage.     If you need medical attention outside of the business hours of the [address] please contact your PCP or go to the nearest emergency room.         Electronically signed by Eliezer Houser MD on 5/25/2022 at 10:21 PM

## 2022-05-27 ENCOUNTER — TELEPHONE (OUTPATIENT)
Dept: CARDIAC CATH/INVASIVE PROCEDURES | Age: 62
End: 2022-05-27

## 2022-05-29 NOTE — PROGRESS NOTES
----- Message from Carmen Driscoll MD sent at 5/23/2022 10:53 AM CDT -----  Regarding: RE: Consider memory care  Unfortunately I discharged Isabell on 5/14 prior to receiving this message. Isabell was at her baseline and was seen by PT/OT/speech. I am also concerned about further cognitive decline but was not sure of the exact benefit of memory care placement at this point since she does have quite a bit of support at home from her daughter in addition to home rehab services. I fully support exploring memory care further with Isabell and her family and have also included a reference to your concerns in my discharge summary in hopes that this is communicated clearly to admitting providers in the case of future readmission. I also placed a neuropsychology referral prior to discharge to more completely assess cognition. I do also have concerns about contribution of depression to her some degree. This was difficult to fully explore while inpatient despite psychology's assessment. Please feel free to reach out if you have any additional concerns or feedback.    Carmen Patel     ----- Message -----  From: Bony Castaneda MD  Sent: 5/15/2022   7:31 AM CDT  To: Carmen Driscoll MD, #  Subject: Consider memory care                             Isabell Oquendo  had several readmissions for her diabetes, dementia and stroke. It would be prudent to consider discharge to memory care long term care.  I will be out of town 5/20 through 6/10 therefore if she is discharged to home unlikely to have hospital  follow-up with me as she is still in the hospital.  The hospital follow-up should be in person  as she did not have follow-up after her stroke admission and need to assess level of functioning.  Dorina, Please make sure the care team is aware of my concerns about Discharge to home. I know we notified them the last admission as well.  Jorge wishes,  Bony Castaneda MD   Pager [phone]    May 29,  [address] [place], [place] [address]ist   Progress Note    Admitting Date and Time: 2/14/2021  6:32 PM  Admit Dx: Diabetic foot infection ([facility] [address] [E11.628, L08.9]    Subjective/interval history:    Patient seen and examined. Denies any specific complaints at this time. Pain of his left lower extremity controlled. Repeat blood cultures drawn on 2/16 showed no growth to date. Hemoglobin down slightly from 8.5 to 7.8. ROS: denies fever, chills, cp, sob, n/v, HA unless stated above.      vitamin D  50,000 Units Oral Weekly    sodium chloride flush  10 mL Intravenous 2 times per day    insulin lispro  3 Units Subcutaneous TID WC    insulin glargine  8 Units Subcutaneous Nightly    sodium chloride flush  10 mL Intravenous 2 times per day    enoxaparin  40 mg Subcutaneous Daily    atorvastatin  40 mg Oral Nightly    clopidogrel  75 mg Oral Daily    aspirin  81 mg Oral Daily    hydroCHLOROthiazide  25 mg Oral Daily    ipratropium-albuterol  3 mL Inhalation Q4H    pantoprazole  40 mg Oral QAM AC    vancomycin  1,250 mg Intravenous Q12H    cefepime  2,000 mg Intravenous Q12H    insulin lispro  0-6 Units Subcutaneous TID WC    insulin lispro  0-3 Units Subcutaneous Nightly         sodium chloride flush, 10 mL, PRN      iopamidol, 110 mL, ONCE PRN      oxyCODONE, 5 mg, Q4H PRN      cyclobenzaprine, 10 mg, TID PRN      sodium chloride flush, 10 mL, PRN      promethazine, 12.5 mg, Q6H PRN    Or      ondansetron, 4 mg, Q6H PRN      polyethylene glycol, 17 g, Daily PRN      acetaminophen, 650 mg, Q6H PRN    Or      acetaminophen, 650 mg, Q6H PRN      melatonin, 9 mg, Nightly PRN      glucose, 15 g, PRN      dextrose, 12.5 g, PRN      glucagon (rDNA), 1 mg, PRN      dextrose, 100 mL/hr, PRN         Objective:    /70   Pulse 90   Temp 98.7 °F (37.1 °C) (Oral)   Resp 20   Ht 5' 11\" (1.803 m)   Wt 182 lb (82.6 kg)   SpO2 96%   BMI 25.38 kg/m² 2022  Review of chart shows she had a follow-up with [facility] 5/24/22. Noted she is presenting to ER May 29, 2022  Bony Castaneda MD     General Appearance: alert and oriented to person, place and time and in no acute distress  Skin: warm and dry  Head: normocephalic and atraumatic  Eyes: pupils equal, round, and reactive to light, extraocular eye movements intact, conjunctivae normal  Neck: neck supple and non tender without mass   Pulmonary/Chest: clear to auscultation bilaterally- no wheezes, rales or rhonchi, normal air movement, no respiratory distress  Cardiovascular: normal rate, normal S1 and S2 and no carotid bruits  Abdomen: soft, non-tender, non-distended, normal bowel sounds, no masses or organomegaly  Extremities: Left foot and postoperative dressing. No cyanosis, no clubbing and no edema  Neurologic: no cranial nerve deficit and speech normal      Recent Labs     02/18/21 0444 02/19/21 0515 02/20/21 0428    133 134   K 3.9 3.9 3.9    99 100   CO2 23 24 25   BUN 14 15 12   CREATININE 0.7 0.5* 0.5*   GLUCOSE 135* 99 109*   CALCIUM 8.8 9.0 8.7       No results for input(s): ALKPHOS, PROT, LABALBU, BILITOT, AST, ALT in the last 72 hours.     Recent Labs     02/18/21 0444 02/19/21 0515 02/20/21 0428   WBC 8.4 10.4 9.2   RBC 3.21* 3.25* 3.01*   HGB 8.2* 8.5* 7.8*   HCT 26.7* 26.2* 24.7*   MCV 83.2 80.6 82.1   MCH 25.5* 26.2 25.9*   MCHC 30.7* 32.4 31.6*   RDW 15.1* 15.0 15.3*    308 270   MPV 9.6 9.5 9.5       CBC:   Lab Results   Component Value Date    WBC 9.2 02/20/2021    RBC 3.01 02/20/2021    HGB 7.8 02/20/2021    HCT 24.7 02/20/2021    MCV 82.1 02/20/2021    MCH 25.9 02/20/2021    MCHC 31.6 02/20/2021    RDW 15.3 02/20/2021     02/20/2021    MPV 9.5 02/20/2021     BMP:    Lab Results   Component Value Date     02/20/2021    K 3.9 02/20/2021    K 3.8 02/16/2021     02/20/2021    CO2 25 02/20/2021    BUN 12 02/20/2021    LABALBU 2.7 02/16/2021    CREATININE 0.5 02/20/2021    CALCIUM 8.7 02/20/2021    GFRAA >60 02/20/2021    LABGLOM >60 02/20/2021    GLUCOSE 109 02/20/2021        Radiology: IR ANGIOGRAM EXTREMITY LEFT   Final Result      VASCULAR REPORT   Final Result      VL LOWER EXTREMITY ARTERIAL SEGMENTAL PRESSURES W PPG BILATERAL   Final Result   1. Significant pressure gradient at the left thigh level that could be   associated with significant stenosis. 2. Normal right ankle brachial index at 1.01. The left MAHOGANY is mildly   decreased at 0.93. Bilateral decreased toe brachial indices is 0.29 on the   right and 0.28 on the left. 3. Abnormal PVRs at the metatarsal level, greater on the left and digital   level. Findings could be related to significant distal small vessel disease. 4. Correlation with imaging such as doppler ultrasound or CT angiography   could be considered. XR FOOT LEFT (2 VIEWS)   Final Result   No acute osseous abnormality. No definitive evidence for osteomyelitis. The osseous structures are osteopenic. XR CHEST PORTABLE   Final Result   No pneumonia or pleural effusion. Assessment/Plan:  Principal Problem:    Staphylococcus aureus bacteremia  Active Problems:    Diabetic foot infection ([facility] [address]    Hypertension    Hemiparesis affecting left side as late effect of cerebrovascular accident [facility])    UTI (urinary tract infection)    Peripheral arterial disease ([facility] [address]  Resolved Problems:    * No resolved hospital problems. *      1. Staph aureus bacteremia  -Currently on vancomycin and cefepime. Infectious disease following  -He also has Enterococcus faecalis growing from surgical wounds sensitive to current antibiotic coverage.  -Bone biopsy negative for osteomyelitis  -Plan is for transesophageal echocardiogram per infectious disease recommendation    2.   Diabetic foot infection/bilateral lower extremity cellulitis  -continue vancomycin and cefepime  -Post wound debridement and bone biopsy left foot  2/16  -Surgical cultures show Enterococcus faecalis, Proteus mirabilis, staph aureus, and staph anginosus 3. Peripheral arterial disease status post left SFA and popliteal angioplasty  -Continue aspirin, statin, and clopidogrel per vascular surgery    4. Uncontrolled type 2 diabetes mellitus  -On basal insulin, prandial insulin, and corrective scale with reasonable blood glucose control. Adjust as needed    5. UTI  -Urine culture growing greater than 100,000 CFU/mL of Proteus mirabilis, sensitive to current antibiotic coverage    6. History of stroke  -Continue aspirin and statin    DVT prophylaxis: Subcutaneous enoxaparin  Full code    Disposition: Discharge to SNF after transesophageal echocardiogram    NOTE: This report was transcribed using voice recognition software. Every effort was made to ensure accuracy; however, inadvertent computerized transcription errors may be present.      Electronically signed by Cary Douglass DO on 2/20/2021 at 11:55 AM

## 2022-05-31 ENCOUNTER — HOSPITAL ENCOUNTER (OUTPATIENT)
Dept: CARDIAC CATH/INVASIVE PROCEDURES | Age: 62
Discharge: HOME | End: 2022-05-31
Attending: SURGERY | Admitting: SURGERY
Payer: COMMERCIAL

## 2022-05-31 VITALS
TEMPERATURE: 96.6 F | OXYGEN SATURATION: 98 % | HEIGHT: 71 IN | WEIGHT: 190 LBS | DIASTOLIC BLOOD PRESSURE: 74 MMHG | BODY MASS INDEX: 26.6 KG/M2 | SYSTOLIC BLOOD PRESSURE: 154 MMHG | HEART RATE: 81 BPM | RESPIRATION RATE: 18 BRPM

## 2022-05-31 DIAGNOSIS — I73.9 PVD (PERIPHERAL VASCULAR DISEASE) (HCC): ICD-10-CM

## 2022-05-31 DIAGNOSIS — I70.244 ATHEROSCLEROSIS OF NATIVE ARTERY OF LEFT LOWER EXTREMITY WITH ULCERATION OF HEEL (HCC): Primary | ICD-10-CM

## 2022-05-31 LAB
ABO/RH: NORMAL
ANION GAP SERPL CALCULATED.3IONS-SCNC: 15 MMOL/L (ref 7–16)
ANTIBODY SCREEN: NORMAL
BUN BLDV-MCNC: 7 MG/DL (ref 6–23)
CALCIUM SERPL-MCNC: 9.3 MG/DL (ref 8.6–10.2)
CHLORIDE BLD-SCNC: 96 MMOL/L (ref 98–107)
CO2: 22 MMOL/L (ref 22–29)
CREAT SERPL-MCNC: 0.7 MG/DL (ref 0.7–1.2)
GFR AFRICAN AMERICAN: >60
GFR NON-AFRICAN AMERICAN: >60 ML/MIN/1.73
GLUCOSE BLD-MCNC: 120 MG/DL (ref 74–99)
HCT VFR BLD CALC: 34.8 % (ref 37–54)
HEMOGLOBIN: 11.5 G/DL (ref 12.5–16.5)
INR BLD: 1.1
MCH RBC QN AUTO: 29.7 PG (ref 26–35)
MCHC RBC AUTO-ENTMCNC: 33 % (ref 32–34.5)
MCV RBC AUTO: 89.9 FL (ref 80–99.9)
PDW BLD-RTO: 13.6 FL (ref 11.5–15)
PLATELET # BLD: 297 E9/L (ref 130–450)
PMV BLD AUTO: 8.9 FL (ref 7–12)
POTASSIUM REFLEX MAGNESIUM: 4.3 MMOL/L (ref 3.5–5)
PROTHROMBIN TIME: 12 SEC (ref 9.3–12.4)
RBC # BLD: 3.87 E12/L (ref 3.8–5.8)
SODIUM BLD-SCNC: 133 MMOL/L (ref 132–146)
WBC # BLD: 8.5 E9/L (ref 4.5–11.5)

## 2022-05-31 PROCEDURE — 75710 ARTERY X-RAYS ARM/LEG: CPT | Performed by: SURGERY

## 2022-05-31 PROCEDURE — C1714 CATH, TRANS ATHERECTOMY, DIR: HCPCS

## 2022-05-31 PROCEDURE — 37225 PR REVSC OPN/PRQ FEM/POP W/ATHRC/ANGIOP SM VSL: CPT | Performed by: SURGERY

## 2022-05-31 PROCEDURE — 85610 PROTHROMBIN TIME: CPT

## 2022-05-31 PROCEDURE — 37228 PR REVSC OPN/PRQ TIB/PERO W/ANGIOPLASTY UNI: CPT | Performed by: SURGERY

## 2022-05-31 PROCEDURE — 37225 HC FEM POP TERRITORY ATHERECTOMY: CPT

## 2022-05-31 PROCEDURE — C1894 INTRO/SHEATH, NON-LASER: HCPCS

## 2022-05-31 PROCEDURE — 2580000003 HC RX 258

## 2022-05-31 PROCEDURE — C2623 CATH, TRANSLUMIN, DRUG-COAT: HCPCS

## 2022-05-31 PROCEDURE — 86850 RBC ANTIBODY SCREEN: CPT

## 2022-05-31 PROCEDURE — 6360000002 HC RX W HCPCS

## 2022-05-31 PROCEDURE — 75710 ARTERY X-RAYS ARM/LEG: CPT

## 2022-05-31 PROCEDURE — 37228 HC TIB PER TERRITORY PLASTY: CPT

## 2022-05-31 PROCEDURE — 2709999900 HC NON-CHARGEABLE SUPPLY

## 2022-05-31 PROCEDURE — C1769 GUIDE WIRE: HCPCS

## 2022-05-31 PROCEDURE — 86901 BLOOD TYPING SEROLOGIC RH(D): CPT

## 2022-05-31 PROCEDURE — C1884 EMBOLIZATION PROTECT SYST: HCPCS

## 2022-05-31 PROCEDURE — C1760 CLOSURE DEV, VASC: HCPCS

## 2022-05-31 PROCEDURE — C1887 CATHETER, GUIDING: HCPCS

## 2022-05-31 PROCEDURE — 75774 ARTERY X-RAY EACH VESSEL: CPT

## 2022-05-31 PROCEDURE — C1725 CATH, TRANSLUMIN NON-LASER: HCPCS

## 2022-05-31 PROCEDURE — 86900 BLOOD TYPING SEROLOGIC ABO: CPT

## 2022-05-31 PROCEDURE — 80048 BASIC METABOLIC PNL TOTAL CA: CPT

## 2022-05-31 PROCEDURE — 85027 COMPLETE CBC AUTOMATED: CPT

## 2022-05-31 PROCEDURE — 75774 ARTERY X-RAY EACH VESSEL: CPT | Performed by: SURGERY

## 2022-05-31 PROCEDURE — 2500000003 HC RX 250 WO HCPCS

## 2022-05-31 RX ORDER — CLOPIDOGREL BISULFATE 75 MG/1
75 TABLET ORAL ONCE
Status: DISCONTINUED | OUTPATIENT
Start: 2022-05-31 | End: 2022-05-31 | Stop reason: HOSPADM

## 2022-05-31 RX ORDER — SODIUM CHLORIDE 0.9 % (FLUSH) 0.9 %
5-40 SYRINGE (ML) INJECTION EVERY 12 HOURS SCHEDULED
Status: DISCONTINUED | OUTPATIENT
Start: 2022-05-31 | End: 2022-05-31

## 2022-05-31 RX ORDER — SODIUM CHLORIDE 0.9 % (FLUSH) 0.9 %
5-40 SYRINGE (ML) INJECTION PRN
Status: DISCONTINUED | OUTPATIENT
Start: 2022-05-31 | End: 2022-05-31

## 2022-05-31 RX ORDER — SODIUM CHLORIDE 9 MG/ML
INJECTION, SOLUTION INTRAVENOUS PRN
Status: DISCONTINUED | OUTPATIENT
Start: 2022-05-31 | End: 2022-05-31

## 2022-05-31 RX ORDER — CLINDAMYCIN PHOSPHATE 600 MG/50ML
600 INJECTION INTRAVENOUS ONCE
Status: DISCONTINUED | OUTPATIENT
Start: 2022-05-31 | End: 2022-05-31

## 2022-05-31 RX ORDER — ONDANSETRON 2 MG/ML
4 INJECTION INTRAMUSCULAR; INTRAVENOUS EVERY 6 HOURS PRN
Status: DISCONTINUED | OUTPATIENT
Start: 2022-05-31 | End: 2022-05-31 | Stop reason: HOSPADM

## 2022-05-31 RX ORDER — SODIUM CHLORIDE 9 MG/ML
INJECTION, SOLUTION INTRAVENOUS CONTINUOUS
Status: DISCONTINUED | OUTPATIENT
Start: 2022-05-31 | End: 2022-05-31

## 2022-05-31 RX ORDER — TRAMADOL HYDROCHLORIDE 50 MG/1
50 TABLET ORAL 2 TIMES DAILY
Status: DISCONTINUED | OUTPATIENT
Start: 2022-05-31 | End: 2022-05-31 | Stop reason: HOSPADM

## 2022-05-31 RX ADMIN — SODIUM CHLORIDE: 9 INJECTION, SOLUTION INTRAVENOUS at 09:39

## 2022-05-31 NOTE — PROGRESS NOTES
Extended Stay Recovery Discharge Documentation    Final procedure site check completed, stable for discharge- Yes  Ambulated without issue post recovery completion, gait steady. Peripheral IV sites removed (see IV Flowsheet) and site asymptomatic- Yes  Patient dressed self without assistance. Discharge instructions reviewed with Patient and verbalized understanding.    Patient discharged Home with brother at 46PM  Monitor cleaned and placed back in nurses' station- Yes

## 2022-05-31 NOTE — H&P
Vascular Surgery History & Physical Exam    Chief Complaint: Peripheral vascular disease, L LE tissue loss    HISTORY OF PRESENT ILLNESS:    The patient is a 57 y.o. male who presents to the hospital for elective arteriogram with possible intervention. The patient has a history of peripheral vascular disease, DM and L LE tissue loss.       ROS : All others Negative if blank [], Positive if [x]  General   [] Fevers   [] Chills   [] Weight Loss   Skin   [x] Tissue Loss   Eyes   [x] Wears Glasses/Contacts   [] Vision Changes   Respiratory    [] Shortness of breath   Cardiovascular   [] Chest Pain   [] Shortness of breath with exertion   Gastrointestinal   [] Abdominal Pain     Past Medical History:   Diagnosis Date    Arthritis     Cerebral artery occlusion with cerebral infarction ([address]     Diabetes mellitus ([address]     Type 2    Diabetic foot ulcer with osteomyelitis ([address] 12/21/2021    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 2 diabetes mellitus, with necrosis of bone ([address] 12/21/2021    Hemiparesis affecting left side as late effect of cerebrovascular accident ([address]     LEFT SIDE NON DOMINANT FOLLOWING STROKE    Hypertension     Peripheral vascular angioplasty status 12/21/2021    Ulcer of left heel, with necrosis of bone ([address] 12/27/2021    Ulcer of left heel, with necrosis of muscle ([address] 12/21/2021     Past Surgical History:   Procedure Laterality Date    FINGER AMPUTATION      FOOT DEBRIDEMENT Left 2/16/2021    LEFT FOOT DEBRIDEMENT WITH BONE BIOPSY, WOUND VAC APPLICATION performed by Mary Garza DPM at [place] [address] ARTHROSCOPY      TONSILLECTOMY      TRANSESOPHAGEAL ECHOCARDIOGRAM N/A 2/22/2021    TRANSESOPHAGEAL ECHOCARDIOGRAM WITH BUBBLE STUDY performed by Nisa Vaz MD at [address] [place] [address] N/A 1/4/2021    EGD BIOPSY performed by Mary Smith DO at [facility] ENDOSCOPY     Current Medications:     Current Outpatient Medications:     traMADol (ULTRAM) 50 MG tablet, Take 50 mg by mouth 2 times daily. , Disp: , Rfl:     sulfamethoxazole-trimethoprim (BACTRIM DS;SEPTRA DS) 800-160 MG per tablet, Take 1 tablet by mouth daily for 30 doses, Disp: 30 tablet, Rfl: 0    cyclobenzaprine (FLEXERIL) 5 MG tablet, Take 1 tablet by mouth nightly as needed for Muscle spasms, Disp: 30 tablet, Rfl: 2    diphenhydrAMINE (BENADRYL ALLERGY) 25 MG capsule, Take 25 mg by mouth every 6 hours as needed for Itching, Disp: , Rfl:     gabapentin (NEURONTIN) 100 MG capsule, Take 100 mg by mouth 2 times daily.  , Disp: , Rfl:     B Complex-C-E-Zn (STRESS B/ZINC) TABS, Take 1 tablet by mouth daily, Disp: , Rfl:     acetaminophen (TYLENOL) 325 MG tablet, Take 650 mg by mouth every 6 hours as needed for Pain, Disp: , Rfl:     ferrous sulfate (IRON 325) 325 (65 Fe) MG tablet, Take 325 mg by mouth daily (with breakfast), Disp: , Rfl:     vitamin D (ERGOCALCIFEROL) 1.25 MG (39955 UT) CAPS capsule, Take 1 capsule by mouth once a week, Disp: 5 capsule, Rfl: 0    melatonin 3 MG TABS tablet, Take 9 mg by mouth nightly as needed, Disp: , Rfl:     aspirin 81 MG EC tablet, Take 81 mg by mouth daily, Disp: , Rfl:     hydroCHLOROthiazide (HYDRODIURIL) 25 MG tablet, Take 25 mg by mouth daily, Disp: , Rfl:     metFORMIN (GLUCOPHAGE) 500 MG tablet, Take 1 tablet by mouth 2 times daily (with meals), Disp: 60 tablet, Rfl: 3    atorvastatin (LIPITOR) 40 MG tablet, Take 1 tablet by mouth nightly, Disp: 30 tablet, Rfl: 3    clopidogrel (PLAVIX) 75 MG tablet, Take 1 tablet by mouth daily, Disp: 30 tablet, Rfl: 3    Current Facility-Administered Medications:     0.9 % sodium chloride infusion, , IntraVENous, Continuous, Zheng Ramirez, APRN - CNP, Last Rate: 75 mL/hr at 05/31/22 0939, New Bag at 05/31/22 0939    sodium chloride flush 0.9 % injection 5-40 mL, 5-40 mL, IntraVENous, 2 times per day, [facility], APRN - CNP    sodium chloride flush 0.9 % injection 5-40 mL, 5-40 mL, IntraVENous, PRN, Wallis Holstein, APRN - CNP    0.9 % sodium chloride infusion, , IntraVENous, PRN, Wallis Holstein, APRN - CNP  Allergies:  Pcn [penicillins]  Social History     Socioeconomic History    Marital status: Single     Spouse name: Not on file    Number of children: Not on file    Years of education: Not on file    Highest education level: Not on file   Occupational History    Not on file   Tobacco Use    Smoking status: Former Smoker    Smokeless tobacco: Never Used   Vaping Use    Vaping Use: Never used   Substance and Sexual Activity    Alcohol use: Not Currently     Comment: Former every day drinker for most of adult life    Drug use: No    Sexual activity: Not on file   Other Topics Concern    Not on file   Social History Narrative    Not on file     Social Determinants of Health     Financial Resource Strain:     Difficulty of Paying Living Expenses: Not on file   Food Insecurity:     Worried About [address] in the Last Year: Not on file    [address] in the Last Year: Not on file   Transportation Needs:     Lack of Transportation (Medical): Not on file    Lack of Transportation (Non-Medical):  Not on file   Physical Activity:     Days of Exercise per Week: Not on file    Minutes of Exercise per Session: Not on file   Stress:     Feeling of Stress : Not on file   Social Connections:     Frequency of Communication with Friends and Family: Not on file    Frequency of Social Gatherings with Friends and Family: Not on file    Attends Druze Services: Not on file    Active Member of Clubs or Organizations: Not on file    Attends Club or Organization Meetings: Not on file    Marital Status: Not on file   Intimate Partner Violence:     Fear of Current or Ex-Partner: Not on file    Emotionally Abused: Not on file    Physically Abused: Not on file    Sexually Abused: Not on file   Housing Stability:     Unable to Pay for Housing in the Last Year: Not on file    Number of Places Lived in the Last Year: Not on file    Unstable Housing in the Last Year: Not on file     No family history on file. PHYSICAL EXAM:    Vitals:    05/31/22 0939   BP: (!) 150/77   Pulse: 63   Resp: 20   Temp: 97.9 °F (36.6 °C)   SpO2: 96%     CONSTITUTIONAL:  awake, alert, cooperative, no apparent distress, and appears stated age  NECK:  supple, symmetrical, trachea midline  LUNGS:  no increased work of breathing, good air exchange   CARDIOVASCULAR:  regular rate and rhythm  ABDOMEN:  soft, non-distended and non-tenderl   Pulse Exam   R femoral 1+ L femoral 1+   R dorsalis pedis biphasic L dorsalis pedis monophasic   R posterior tibial biphasic L posterior tibial monophasic     LABS:    Lab Results   Component Value Date    WBC 8.5 05/31/2022    HGB 11.5 (L) 05/31/2022    HCT 34.8 (L) 05/31/2022     05/31/2022    PROTIME 12.0 05/31/2022    INR 1.1 05/31/2022    APTT 34.1 11/15/2015    K 4.3 05/31/2022    BUN 7 05/31/2022    CREATININE 0.7 05/31/2022     Assesment:  Peripheral vascular disease. L LE tissue loss  PLAN:    · Aortogram,  Left lower extremity arteriogram, possible intervention. · I reviewed the procedure with the patient and family as available. I discussed the procedure, risks, benefits, complications, and alternatives of the procedure. They understand and consent.   All questions were answered    Becky Regan, APRN - CNP     Dayan Bradford MD

## 2022-05-31 NOTE — OP NOTE
Cardiovascular Lab Procedure Report    Zuly Tatum  1960    Date : 5/31/2022  Surgeon: Neelam Reich M.D. Pre-procedure Diagnosis: l LE tissue loss  Post-procedure Diagnosis: Same  Procedure:      Right  common femoral artery access   with US guidance    6 fr angioseal used for closure    TF Left lower extremity angiogram   27565  76708 Angiogram with catheter in Left   common femoral, popliteal artery   85054 Left SFA, Popliteal atherectomy (Turbohawk - hawkone 6 fr M)    Right SFA, Popliteal angioplasty with 5x250 DCB distal sfa and AK pop  4x80 of BK pop and TP trunk    # 5 Spyder used   65217 L PT plasty with 3x80 pacifica proximally  L PT plasty in the foot with 2x120 nanocross   Anesthesia: Local with IV sedation  Assistants: Cath Lab Staff  Estimated Blood Loss: Minimal  Complications: none  Findings:    Left Left s/p Intervention   Common Femoral Art Patent patent   Superficial Femoral Art Distally > 80% stenosis Patent   Profunda Femoral Art patent patent   AK Popliteal Art Short segment  patent   BK Popliteal Art > 60% stenosis patent   Anterior Tibial Art Patent distally occludes at ankle Patent, improved flow into foot   Tibioperoneal Trunk Patent > 60% stenosis patent   Peroneal Art Patent occludes at ankle Patent occludes at ankle   Posterior Tibial Art Patent > 70% stenosis proximally distally occludes at ankle Patent, runoff into foot     Procedure Details : There was not previous CTA , or catheter based diagnostic imaging preformed prior to today's procedure. Timeout preformed identifying pt and procedure. Groins prepped and draped in sterile fashion. Patient given sedation as needed throughout the case. Right  common femoral artery was noted to be patent and was accessed under ultrasound guidance after infiltrating with local.  Micropuncture placed, exchanged out for 5 fr sheath. Advantage glide wire and contra catheter advanced into distal aorta.  Catheter and wire used to access Left  iliac system and catheter placed at common femoral and angiogram of theLeft  lower extremity preformed. Significant occlusive disease was noted in the sfa, po pand tibials. Patient was given 8000 units of heparin and than a 6 fr destination sheath advanced to mid superficial femoral artery on the left. Imaging with the catheter in the sfa to further evaluate the sfa, popliteal lesion. 0.35 [place] blazer catheter and advantage glide wire used to traverse stenosis. The catheter was placed in the below knee popliteal artery and angiogram was preformed to further evaluate the tibial arteries and runoff. The PT was accessed and distally a 2x120 nanocross was used into the foot. Proximally the PT was treated with 3x80 pacifica. A # 5 spyder filter deployed. The sfa and pop lesion was treated with atherectomy. Post dilation with a 5x250 balloon was completed. The bk pop and tp trunk were plastied with 4x80 DCB impact. There was evidence of some residual stenosis. Completion angiogram noted much improved filling distally and brisk flow though the stenotic area. Sheath and wire pulled back to Right iliac system.   After femoral angiogram a 6 fr angioseal device used to close the Right common femoral artery    Postop Exam  Right DP biphasic  PT biphasic  Left DP strongly biphasic  PT biphasic    Plan  Continue Medical Management with plavix and statin  Encourage continued tobacco cessation  No plans for further vascular surgical intervention     Shawn Sy MD    PCP : Negro Leyva MD  Podiatry : Dr. Azeb Francis

## 2022-06-08 ENCOUNTER — HOSPITAL ENCOUNTER (OUTPATIENT)
Dept: WOUND CARE | Age: 62
Discharge: HOME | End: 2022-06-08

## 2022-06-08 DIAGNOSIS — G89.29 CHRONIC LOW BACK PAIN WITHOUT SCIATICA, UNSPECIFIED BACK PAIN LATERALITY: Primary | ICD-10-CM

## 2022-06-08 DIAGNOSIS — M54.50 CHRONIC LOW BACK PAIN WITHOUT SCIATICA, UNSPECIFIED BACK PAIN LATERALITY: Primary | ICD-10-CM

## 2022-06-09 RX ORDER — GABAPENTIN 100 MG/1
300 CAPSULE ORAL 2 TIMES DAILY
Qty: 60 CAPSULE | Refills: 2 | Status: SHIPPED
Start: 2022-06-09 | End: 2022-06-22 | Stop reason: SDUPTHER

## 2022-06-15 ENCOUNTER — HOSPITAL ENCOUNTER (OUTPATIENT)
Dept: WOUND CARE | Age: 62
Discharge: HOME | End: 2022-06-15

## 2022-06-17 RX ORDER — GLIMEPIRIDE 2 MG/1
2 TABLET ORAL
Qty: 30 TABLET | Refills: 2 | Status: SHIPPED
Start: 2022-06-17 | End: 2022-08-23 | Stop reason: SDUPTHER

## 2022-06-17 RX ORDER — GLIMEPIRIDE 2 MG/1
TABLET ORAL
COMMUNITY
Start: 2022-05-16 | End: 2022-06-17 | Stop reason: SDUPTHER

## 2022-06-17 RX ORDER — HYDROCHLOROTHIAZIDE 25 MG/1
25 TABLET ORAL DAILY
Qty: 30 TABLET | Refills: 2 | Status: ON HOLD
Start: 2022-06-17 | End: 2022-09-26 | Stop reason: HOSPADM

## 2022-06-22 ENCOUNTER — HOSPITAL ENCOUNTER (OUTPATIENT)
Dept: WOUND CARE | Age: 62
Discharge: HOME | End: 2022-06-22
Payer: COMMERCIAL

## 2022-06-22 VITALS
TEMPERATURE: 96.7 F | BODY MASS INDEX: 26.6 KG/M2 | RESPIRATION RATE: 18 BRPM | DIASTOLIC BLOOD PRESSURE: 72 MMHG | SYSTOLIC BLOOD PRESSURE: 124 MMHG | WEIGHT: 190 LBS | HEART RATE: 92 BPM | HEIGHT: 71 IN

## 2022-06-22 DIAGNOSIS — E11.628 DIABETIC FOOT INFECTION (HCC): ICD-10-CM

## 2022-06-22 DIAGNOSIS — L97.424 DIABETIC ULCER OF LEFT HEEL ASSOCIATED WITH TYPE 2 DIABETES MELLITUS, WITH NECROSIS OF BONE (HCC): Primary | ICD-10-CM

## 2022-06-22 DIAGNOSIS — E11.621 DIABETIC ULCER OF LEFT HEEL ASSOCIATED WITH TYPE 2 DIABETES MELLITUS, WITH NECROSIS OF BONE (HCC): Primary | ICD-10-CM

## 2022-06-22 DIAGNOSIS — L08.9 DIABETIC FOOT INFECTION (HCC): ICD-10-CM

## 2022-06-22 DIAGNOSIS — G89.29 CHRONIC LOW BACK PAIN WITHOUT SCIATICA, UNSPECIFIED BACK PAIN LATERALITY: ICD-10-CM

## 2022-06-22 DIAGNOSIS — M54.50 CHRONIC LOW BACK PAIN WITHOUT SCIATICA, UNSPECIFIED BACK PAIN LATERALITY: ICD-10-CM

## 2022-06-22 DIAGNOSIS — I70.244 ATHEROSCLEROSIS OF NATIVE ARTERY OF LEFT LOWER EXTREMITY WITH ULCERATION OF HEEL (HCC): ICD-10-CM

## 2022-06-22 PROCEDURE — 87075 CULTR BACTERIA EXCEPT BLOOD: CPT

## 2022-06-22 PROCEDURE — 11042 DBRDMT SUBQ TIS 1ST 20SQCM/<: CPT

## 2022-06-22 PROCEDURE — 87077 CULTURE AEROBIC IDENTIFY: CPT

## 2022-06-22 PROCEDURE — 87070 CULTURE OTHR SPECIMN AEROBIC: CPT

## 2022-06-22 PROCEDURE — 87205 SMEAR GRAM STAIN: CPT

## 2022-06-22 PROCEDURE — 87186 SC STD MICRODIL/AGAR DIL: CPT

## 2022-06-22 PROCEDURE — 6370000000 HC RX 637 (ALT 250 FOR IP): Performed by: SURGERY

## 2022-06-22 PROCEDURE — 11042 DBRDMT SUBQ TIS 1ST 20SQCM/<: CPT | Performed by: SURGERY

## 2022-06-22 RX ORDER — CLOBETASOL PROPIONATE 0.5 MG/G
OINTMENT TOPICAL ONCE
Status: CANCELLED | OUTPATIENT
Start: 2022-06-22 | End: 2022-06-22

## 2022-06-22 RX ORDER — LIDOCAINE HYDROCHLORIDE 40 MG/ML
SOLUTION TOPICAL ONCE
Status: CANCELLED | OUTPATIENT
Start: 2022-06-22 | End: 2022-06-22

## 2022-06-22 RX ORDER — LIDOCAINE HYDROCHLORIDE 20 MG/ML
JELLY TOPICAL ONCE
Status: CANCELLED | OUTPATIENT
Start: 2022-06-22 | End: 2022-06-22

## 2022-06-22 RX ORDER — LIDOCAINE 50 MG/G
OINTMENT TOPICAL ONCE
Status: CANCELLED | OUTPATIENT
Start: 2022-06-22 | End: 2022-06-22

## 2022-06-22 RX ORDER — BETAMETHASONE DIPROPIONATE 0.05 %
OINTMENT (GRAM) TOPICAL ONCE
Status: CANCELLED | OUTPATIENT
Start: 2022-06-22 | End: 2022-06-22

## 2022-06-22 RX ORDER — BACITRACIN ZINC AND POLYMYXIN B SULFATE 500; 1000 [USP'U]/G; [USP'U]/G
OINTMENT TOPICAL ONCE
Status: CANCELLED | OUTPATIENT
Start: 2022-06-22 | End: 2022-06-22

## 2022-06-22 RX ORDER — GABAPENTIN 100 MG/1
300 CAPSULE ORAL 2 TIMES DAILY
Qty: 60 CAPSULE | Refills: 2 | Status: SHIPPED | OUTPATIENT
Start: 2022-06-22 | End: 2022-10-31

## 2022-06-22 RX ORDER — GINSENG 100 MG
CAPSULE ORAL ONCE
Status: CANCELLED | OUTPATIENT
Start: 2022-06-22 | End: 2022-06-22

## 2022-06-22 RX ORDER — BACITRACIN, NEOMYCIN, POLYMYXIN B 400; 3.5; 5 [USP'U]/G; MG/G; [USP'U]/G
OINTMENT TOPICAL ONCE
Status: CANCELLED | OUTPATIENT
Start: 2022-06-22 | End: 2022-06-22

## 2022-06-22 RX ORDER — GENTAMICIN SULFATE 1 MG/G
OINTMENT TOPICAL ONCE
Status: CANCELLED | OUTPATIENT
Start: 2022-06-22 | End: 2022-06-22

## 2022-06-22 RX ORDER — LIDOCAINE 40 MG/G
CREAM TOPICAL ONCE
Status: CANCELLED | OUTPATIENT
Start: 2022-06-22 | End: 2022-06-22

## 2022-06-22 RX ORDER — LIDOCAINE HYDROCHLORIDE 40 MG/ML
SOLUTION TOPICAL ONCE
Status: COMPLETED | OUTPATIENT
Start: 2022-06-22 | End: 2022-06-22

## 2022-06-22 RX ADMIN — LIDOCAINE HYDROCHLORIDE 10 ML: 40 SOLUTION TOPICAL at 13:37

## 2022-06-22 ASSESSMENT — PAIN DESCRIPTION - ORIENTATION: ORIENTATION: LEFT

## 2022-06-22 ASSESSMENT — PAIN - FUNCTIONAL ASSESSMENT: PAIN_FUNCTIONAL_ASSESSMENT: ACTIVITIES ARE NOT PREVENTED

## 2022-06-22 ASSESSMENT — PAIN DESCRIPTION - ONSET: ONSET: ON-GOING

## 2022-06-22 ASSESSMENT — PAIN DESCRIPTION - PAIN TYPE: TYPE: CHRONIC PAIN

## 2022-06-22 ASSESSMENT — PAIN DESCRIPTION - DESCRIPTORS: DESCRIPTORS: ACHING

## 2022-06-22 ASSESSMENT — PAIN DESCRIPTION - LOCATION: LOCATION: FOOT

## 2022-06-22 ASSESSMENT — PAIN SCALES - GENERAL: PAINLEVEL_OUTOF10: 1

## 2022-06-22 ASSESSMENT — PAIN DESCRIPTION - FREQUENCY: FREQUENCY: INTERMITTENT

## 2022-06-22 NOTE — PROGRESS NOTES
Wound Healing Center Followup Visit Note    Referring Physician : Mihir Hodges MD  [id number] RECORD NUMBER:  [id number]  AGE: 57 y.o. GENDER: male  : 1960  EPISODE DATE:  2022    Subjective:     Chief Complaint   Patient presents with    Wound Check     left heel      HISTORY of PRESENT ILLNESS CHIQUITA Stone Res is a 57 y.o. male who presents today in regards to follow up evaluation and treatment of wound/ulcer. That patient's past medical, family and social hx were reviewed and changes were made if present. History of Wound Context:  The patient has had a wound of bilateral heels which was first noted approximately over a month ago. This has been treated with surgical debridement. On their initial visit to the wound healing center, 21 ,  the patient has noted that the wound has not been improving. The patient has had similar previous wounds in the past.       Pt is not on abx at time of initial visit.     3/22/21 - Wound VAC MWF continue to left heel. Aquacel Ag to right heel. Debrided toenails 1-5 in thickness and length. FU 1 week. IV Abx per Dr. Marcia Saavedra.     21 - Wound VAC MWF to left heel. Cultures obtained. FU 1 week after visit with Dr. Marcia Saavedra. PO Abx.  21 - DC wound VAC to left heel. Alginate to wound. FU 1 week  21 - Alginate and gentamicin ointment QOD. Partial WB to heel at 50% to foot with surgical shoe and walker.     5/3/21 - Alginate and gentamicin ointment QOD. MRI left heel     5/10/21 - Alginate and gentamicin ointment QOD. MRI left heel pending.     21 - Alginate and gentamicin ointment QOD. MRI reviewed confirming OM. Discussed HBOT referral and partial calcanectomy. He opts for HBOT consultation.     21 - Alginate and add gentamicin ointment QD. HBOT referral.  Patient hold off on partial calcanectomy. FU 1 week     21 - Alginate and add gentamicin ointment QD.   HBOT referral.  Patient hold off on partial calcanectomy. FU 1 week     6/28/21 - Alginate and add gentamicin ointment QD. HBOT referral.  Patient hold off on partial calcanectomy. FU 1 week. Patient gave me consent (verbal) to speak with his brother Samantha Sow regarding his at home arrangement. HBOT is on hold until patient is at home.     7/12/21 -  Alginate and add gentamicin ointment QD. HBOT referral.  Patient considering partial calcanectomy with flap. FU 1 week.      7/19/21 - Alginate and add gentamicin ointment QD. HBOT referral.  Patient considering partial calcanectomy with flap. FU 1 week.      8/2/21 - Alginate and add gentamicin ointment QD. HBOT referral.  Patient considering partial calcanectomy with flap. FU 1 week.      8/16/21 - Alginate and add gentamicin ointment QD. HBOT referral.  Patient considering partial calcanectomy with flap. FU 1 week.      8/23/21 - Alginate and add gentamicin ointment QD. HBOT referral.  Patient considering partial calcanectomy with flap. FU 1 week.      8/30/21 - Alginate and add gentamicin ointment QD. HBOT referral.  Patient considering partial calcanectomy with flap. FU 1 week.      9/20/21 - Alginate and add gentamicin ointment QD. HBOT referral.  Patient considering partial calcanectomy with flap. FU 1 week.      10/25/21 Alginate and add gentamicin ointment QD. HBOT referral.  Patient considering partial calcanectomy with flap. FU 1 week.      11/1/21 Alginate and add gentamicin ointment QD. HBOT referral.  Patient considering partial calcanectomy with flap. FU 1 week.      11/8/21 - Alginate and add gentamicin ointment QD. HBOT referral.  Patient considering partial calcanectomy with flap. FU 1 week.      11/15/21 - Alginate and add gentamicin ointment QD. HBOT referral.  Patient considering partial calcanectomy with flap. FU 1 week.      11/22/21 -  Alginate and add gentamicin ointment QD. HBOT referral.  Patient considering partial calcanectomy with flap. FU 1 week.    11/29/21 0 Alginate and gentamicin ointment. Possible DC home. HBOT referral.  Patient considering partial calcanectomy with flap. FU 1 week.      12/6/21 - Alginate and gentamicin ointment. Possible DC home. HBOT referral.  Patient considering partial calcanectomy with flap. FU 1 week.      12/13/21 - Alginate and gentamicin ointment. DC home. HBOT referral.  [facility]  referral for wound care and PT for GAIT training and safe DME use. Patient considering partial calcanectomy with flap. FU 1 week.      12/20/21 -  Alginate and gentamicin ointment. DC home. HBOT referral.     12/27/21 - HBOT consultation complete - to start HBOT after new year. 2-14-22 patient presents today for evaluation of a left heel wound. Patient transferred here to [facility] due to the fact that he is doing HBO, for convenience due to transportation. He was prior following with Dr. Alexa Cavazos. Patient overall doing well, no complaints. Tolerating dressings as well as hyperbarics. Physical exam demonstrates vascular intact. Wound appreciated posterior aspect with areas of devitalized nonviable tissue with beefy tissue noted as well. There is no purulence, odor, erythema or increase in temperature. Currently no exposed bone. 2-24-22  Patient overall doing well, no complaints. Tolerating dressings as well as hyperbarics. Physical exam demonstrates vascular intact. Wound appreciated posterior aspect with areas of devitalized nonviable tissue with beefy tissue noted as well. There is no purulence, odor, erythema or increase in temperature. 3-3-22 presents in follow-up. Relates dark area on his left great toenail as well as \"swelling\"  Left lower leg that he noticed recently. Patient denies any known trauma. Patient currently takes aspirin as well as Plavix. Physical exam demonstrates vascular intact.   Wound appreciated posterior aspect with areas of devitalized nonviable tissue with beefy tissue noted as well. There is no purulence, odor, erythema or increase in temperature. Left great toenail proximal lateral small area, subungual hematoma, stable. 2 fluctuant areas adjacent left lower leg anterior lateral aspect. There is no erythema or increase in temperature. After oral consent under sterile technique and utilizing 1% lidocaine plain, 21-gauge needle as well as 11 blade stab incision expressing approximately 20 cc of hematoma formation, this was cultured. Monitor closely if he notices any recurrence recommend [facility] for further evaluation    3-28-22 last seen March 3rd, transportation issues. Physical exam, vascular intact. Wound appreciated posterior aspect left heel with areas of devitalized nonviable tissue, increasing/seen. There is no purulence, odor, erythema or increase in temperature. Change treatment to Santyl. Patient would benefit from continued HBO.    4-4-22 Physical exam, vascular intact. Wound appreciated posterior aspect left heel with areas of devitalized nonviable tissue w/ areas of beefy tissue. There is no purulence, odor, erythema or increase in temperature. Santyl. HBO. Improved at present. 4-11-22 Wound appreciated posterior aspect left heel with areas of devitalized nonviable tissue w/ areas of beefy tissue. There is no purulence, odor, erythema or increase in temperature. Santyl. HBO. 4-18-22 Wound appreciated posterior aspect left heel with areas of devitalized nonviable tissue w/ areas of beefy tissue. There is no purulence, odor, erythema or increase in temperature. Santyl. HBO. Discussed possible graft in near future, will get updated vasc studies.     5/9/2022  · Patient did not come last 2 weeks and also did not go for hyperbaric, last week, because of transportation problems  · Patient tells me at one time his podiatrist Brian Solomon informed him that he may consider skin grafting of the wound  · On today's examination, I can literally feel the bone, but looks fairly clean  · Patient is trying to continue offloading the heel ulcer, and also continuing the hyperbaric oxygen therapy treatments  · The last lower extremity artery Doppler study done in 2021 revealed almost triphasic right ankle Doppler tracings and biphasic left ankle Doppler tracings with adequate flow to the heel and foot for potential tissue healing  · Patient was rescheduled for repeat proximal artery Doppler study by his podiatrist, not done yet I do not see it in the epic  · Inform the patient that last week I discussed with Dr. Tasha Diehl regarding continuing the hyperbaric oxygen therapy, possibility of skin grafting  · Discussed with patient and nursing staff, please schedule appointment for the patient to see Dr. Tasha Diehl for his input pending return to have his podiatrist back to the wound care center for further management  ·  we will make additional recommendations once the lower extremity artery Doppler studies completed  · All his questions were answered  5/18/22  · Concern that arterial flow is not adequate based off exam today  · Left dp weakly biphasic, PT monophasic  · Will hold hbo for now - reevaluate post angio  · Discussed angiogram - he had previous with Dr Eileen Damon which addressed his left sfa, pop with plasty using 6x250 DCB impact  · Concern that tibial disease is the primary issue vs. Previous intervention has shut down  5/25/22  · Angio 5/31  · Wound stable heel  5/31/22  · L SFA, pop atherectomy, plasty with 5x250  · L PT plasty 3x80, 2x120  6/22/22   · Wound larger but bone covered with granulation tissue  · L DP strongly biphasic, PT biphasic, no right groin hematoma  · aquacell ag  · Culture  · Consideration for graft in future  · Consideration for hbo in future    Wound/Ulcer Pain Timing/Severity: none  Quality of pain: N/A  Severity:  0 / 10   Modifying Factors: None  Associated Signs/Symptoms: none     Ulcer Identification:  Ulcer Type: arterial and diabetic  Contributing Factors: diabetes     Diabetic/Pressure/Non Pressure Ulcers onl y:        PAST MEDICAL HISTORY      Diagnosis Date    Arthritis     Cerebral artery occlusion with cerebral infarction ([address]     Diabetes mellitus ([address]     Type 2    Diabetic foot ulcer with osteomyelitis ([address] 12/21/2021    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 2 diabetes mellitus, with necrosis of bone ([address] 12/21/2021    Hemiparesis affecting left side as late effect of cerebrovascular accident ([address]     LEFT SIDE NON DOMINANT FOLLOWING STROKE    Hypertension     Peripheral vascular angioplasty status 12/21/2021    Ulcer of left heel, with necrosis of bone ([address] 12/27/2021    Ulcer of left heel, with necrosis of muscle ([address] 12/21/2021     Past Surgical History:   Procedure Laterality Date    FINGER AMPUTATION      FOOT DEBRIDEMENT Left 2/16/2021    LEFT FOOT DEBRIDEMENT WITH BONE BIOPSY, WOUND VAC APPLICATION performed by Mary Garza DPM at [place] [address] ARTHROSCOPY      TONSILLECTOMY      TRANSESOPHAGEAL ECHOCARDIOGRAM N/A 2/22/2021    TRANSESOPHAGEAL ECHOCARDIOGRAM WITH BUBBLE STUDY performed by Nisa Vaz MD at [facility] N/A 1/4/2021    EGD BIOPSY performed by Mary Smith DO at [place] [address] reviewed. No pertinent family history.   Social History     Tobacco Use    Smoking status: Former Smoker    Smokeless tobacco: Never Used   Vaping Use    Vaping Use: Never used   Substance Use Topics    Alcohol use: Not Currently     Comment: Former every day drinker for most of adult life    Drug use: No     Allergies   Allergen Reactions    Pcn [Penicillins]      Current Outpatient Medications on File Prior to Encounter   Medication Sig Dispense Refill    hydroCHLOROthiazide (HYDRODIURIL) 25 MG tablet Take 1 tablet by mouth daily 30 tablet 2    glimepiride (AMARYL) 2 MG tablet Take 1 tablet by mouth every morning (before breakfast) 30 tablet 2    gabapentin (NEURONTIN) 100 MG capsule Take 3 capsules by mouth 2 times daily for 30 days. 60 capsule 2    traMADol (ULTRAM) 50 MG tablet Take 50 mg by mouth 2 times daily.  diphenhydrAMINE (BENADRYL ALLERGY) 25 MG capsule Take 25 mg by mouth every 6 hours as needed for Itching      B Complex-C-E-Zn (STRESS B/ZINC) TABS Take 1 tablet by mouth daily      acetaminophen (TYLENOL) 325 MG tablet Take 650 mg by mouth every 6 hours as needed for Pain      ferrous sulfate (IRON 325) 325 (65 Fe) MG tablet Take 325 mg by mouth daily (with breakfast)      vitamin D (ERGOCALCIFEROL) 1.25 MG (44458 UT) CAPS capsule Take 1 capsule by mouth once a week 5 capsule 0    melatonin 3 MG TABS tablet Take 9 mg by mouth nightly as needed      aspirin 81 MG EC tablet Take 81 mg by mouth daily      metFORMIN (GLUCOPHAGE) 500 MG tablet Take 1 tablet by mouth 2 times daily (with meals) 60 tablet 3    atorvastatin (LIPITOR) 40 MG tablet Take 1 tablet by mouth nightly 30 tablet 3    clopidogrel (PLAVIX) 75 MG tablet Take 1 tablet by mouth daily 30 tablet 3     No current facility-administered medications on file prior to encounter.        REVIEW OF SYSTEMS See HPI    Objective:    /72   Pulse 92   Temp (!) 96.7 °F (35.9 °C) (Temporal)   Resp 18   Ht 5' 11\" (1.803 m)   Wt 190 lb (86.2 kg)   BMI 26.50 kg/m²   Wt Readings from Last 3 Encounters:   06/22/22 190 lb (86.2 kg)   05/31/22 190 lb (86.2 kg)   05/18/22 195 lb (88.5 kg)     PHYSICAL EXAM  CONSTITUTIONAL:   Awake, alert, cooperative   EYES:  lids and lashes normal   ENT: external ears and nose without lesions   NECK:  supple, symmetrical, trachea midline   SKIN:  Open wound     Assessment:     Problem List Items Addressed This Visit     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 2 diabetes mellitus, with necrosis of bone ([facility] - Primary (Chronic)    Relevant Orders    Initiate Outpatient Wound Care Protocol    Atherosclerosis of native artery of left lower extremity with ulceration of heel (HCC)    Diabetic foot infection ([address]    Relevant Orders    Initiate Outpatient Wound Care Protocol          Pre Debridement Measurements:  Are located in the [facility]  Documentation Flow Sheet  Post Debridement Measurements:  Wound/Ulcer Descriptions are Pre Debridement except measurements:     Wound 12/26/20 Arm Left (Active)   Number of days: 542       Wound 02/15/21 Heel Left (Active)   Number of days: 492       Wound 02/15/21 Heel Right (Active)   Number of days: 492       Wound 03/08/21 Heel Left #1 left heel aquired 12/1/20 (Active)   Wound Image   06/22/22 1324   Wound Etiology Diabetic 02/14/22 1344   Dressing Status New dressing applied 05/25/22 1400   Wound Cleansed Cleansed with saline 05/25/22 1400   Dressing/Treatment Alginate;ABD;Dry dressing 05/31/22 1600   Offloading for Diabetic Foot Ulcers Post op shoe 05/25/22 1400   Wound Length (cm) 5.5 cm 06/22/22 1324   Wound Width (cm) 4.5 cm 06/22/22 1324   Wound Depth (cm) 1.1 cm 06/22/22 1324   Wound Surface Area (cm^2) 24.75 cm^2 06/22/22 1324   Change in Wound Size % (l*w) -104.71 06/22/22 1324   Wound Volume (cm^3) 27.225 cm^3 06/22/22 1324   Wound Healing % -222 06/22/22 1324   Post-Procedure Length (cm) 5.6 cm 06/22/22 1402   Post-Procedure Width (cm) 4.6 cm 06/22/22 1402   Post-Procedure Depth (cm) 1.2 cm 06/22/22 1402   Post-Procedure Surface Area (cm^2) 25.76 cm^2 06/22/22 1402   Post-Procedure Volume (cm^3) 30.912 cm^3 06/22/22 1402   Undermining Starts ___ O'Clock 9 05/25/22 1329   Undermining Ends___ O'Clock 3 05/25/22 1329   Undermining Maxium Distance (cm) Don@iPerceptions 05/25/22 1329   Wound Assessment Slough;[place]/red 06/22/22 1324   Drainage Amount Moderate 06/22/22 1324   Drainage Description Yellow 06/22/22 1324   Odor None 06/22/22 1324   Adrianne-wound Assessment Intact 06/22/22 1324   Number of days: 471       Wound 03/08/21 Heel Right #2 rt heel aquired 12/1/20 (Active)   Wound Image   04/05/21 0929   Dressing Status New dressing applied 03/18/21 1352   Wound Cleansed Cleansed with saline 04/01/21 1536   Dressing/Treatment Alginate;Collagen;Silicone border 58/71/42 1536   Offloading for Diabetic Foot Ulcers Post op shoe 04/01/21 1536   Wound Length (cm) 0 cm 04/05/21 0929   Wound Width (cm) 0 cm 04/05/21 0929   Wound Depth (cm) 0 cm 04/05/21 0929   Wound Surface Area (cm^2) 0 cm^2 04/05/21 0929   Change in Wound Size % (l*w) 100 04/05/21 0929   Wound Volume (cm^3) 0 cm^3 04/05/21 0929   Wound Healing % 100 04/05/21 0929   Post-Procedure Length (cm) 0 cm 04/05/21 1011   Post-Procedure Width (cm) 0 cm 04/05/21 1011   Post-Procedure Depth (cm) 0 cm 04/05/21 1011   Post-Procedure Surface Area (cm^2) 0 cm^2 04/05/21 1011   Post-Procedure Volume (cm^3) 0 cm^3 04/05/21 1011   Wound Assessment Fibrin 04/01/21 1357   Drainage Amount None 04/01/21 1357   Drainage Description Yellow 03/22/21 0911   Odor None 04/01/21 1357   Adrianne-wound Assessment Maceration 04/01/21 1357   Number of days: 471       Wound 05/12/22 Pretibial Left #2 (Active)   Wound Image   06/22/22 1324   Wound Etiology Skin Tear 05/12/22 1306   Dressing Status New dressing applied 05/25/22 1400   Wound Cleansed Cleansed with saline 05/25/22 1400   Dressing/Treatment Alginate;Dry dressing 05/31/22 1600   Offloading for Diabetic Foot Ulcers Offloading not required 05/25/22 1400   Wound Length (cm) 2.8 cm 06/22/22 1324   Wound Width (cm) 1 cm 06/22/22 1324   Wound Depth (cm) 0.1 cm 06/22/22 1324   Wound Surface Area (cm^2) 2.8 cm^2 06/22/22 1324   Change in Wound Size % (l*w) -79.49 06/22/22 1324   Wound Volume (cm^3) 0.28 cm^3 06/22/22 1324   Wound Healing % -79 06/22/22 1324   Post-Procedure Length (cm) 2.9 cm 06/22/22 1402   Post-Procedure Width (cm) 1.1 cm 06/22/22 1402   Post-Procedure Depth (cm) 0.2 cm 06/22/22 1402   Post-Procedure Surface Area (cm^2) 3.19 cm^2 06/22/22 1402   Post-Procedure Volume (cm^3) 0.638 cm^3 06/22/22 1402 Wound Assessment Dry;Fibrin 06/22/22 1324   Drainage Amount None 06/22/22 1324   Drainage Description Serous 05/25/22 1329   Odor None 06/22/22 1324   Adrianne-wound Assessment Intact 06/22/22 1324   Number of days: 41     Incision 02/19/21 Groin Right (Active)   Number of days: 488       Procedure Note  Indications:  Based on my examination of this patient's wound(s)/ulcer(s) today, debridement is required to promote healing and evaluate the wound base. Performed by: Diego Bustos MD    Consent obtained:  Yes    Time out taken:  Yes    Pain Control: Anesthetic  Anesthetic: 4% Lidocaine Liquid Topical     Debridement:Excisional Debridement    Using curette the wound(s)/ulcer(s) was/were sharply debrided down through and including the removal of epidermis, dermis and subcutaneous tissue. Devitalized Tissue Debrided:  fibrin, biofilm, slough and exudate to stimulate bleeding to promote healing, post debridement good bleeding base and wound edges noted    Wound/Ulcer #: 1,2    Percent of Wound/Ulcer Debrided: 70%    Total Surface Area Debrided:  20 sq cm     Estimated Blood Loss:  Minimal  Hemostasis Achieved:  by pressure    Procedural Pain:  4  / 10   Post Procedural Pain:  2 / 10     Response to treatment:  Well tolerated by patient. A culture was done      Plan:   Treatment Note please see attached Discharge Instructions    Written patient dismissal instructions given to patient and signed by patient or POA. Discharge Instructions       Visit Discharge/Physician Orders     Assessment of pain at discharge: moderate     Anesthetic used: 4% liquid lidocaine solution      Discharge to:      Left via:ambulance     Accompanied by: self     [facility]      Dressing Orders:  Cleanse left heel ulcer & Left pretib with normal saline solution, apply plain alginate Ag and dry dressing and change every day.     Treatment Orders:   Wear prevalon boots or heel ayo while in bed.       May be partial weight bearing up to 50% on left with surgical shoe. Culture taken today 6/22/22  Start workup for graft      [facility] followup visit: ______________ 1 week Dr MENDOZA______________  (Please note your next appointment above and if you are unable to keep, kindly give a 24 hour notice. Thank you.)     Physician signature:__________________________        If you experience any of the following, please call the [facility] during business hours:     * Increase in Pain  * Temperature over 101  * Increase in drainage from your wound  * Drainage with a foul odor  * Bleeding  * Increase in swelling  * Need for compression bandage changes due to slippage, breakthrough drainage.     If you need medical attention outside of the business hours of the [facility] please contact your PCP or go to the nearest emergency room.         Electronically signed by Neelam Reich MD on 6/22/2022 at 2:30 PM

## 2022-06-24 LAB — ANAEROBIC CULTURE: NORMAL

## 2022-06-29 ENCOUNTER — HOSPITAL ENCOUNTER (OUTPATIENT)
Dept: WOUND CARE | Age: 62
Discharge: HOME | End: 2022-06-29
Payer: COMMERCIAL

## 2022-06-29 VITALS
HEART RATE: 88 BPM | DIASTOLIC BLOOD PRESSURE: 76 MMHG | SYSTOLIC BLOOD PRESSURE: 138 MMHG | RESPIRATION RATE: 18 BRPM | TEMPERATURE: 97.6 F

## 2022-06-29 DIAGNOSIS — L97.424 DIABETIC ULCER OF LEFT HEEL ASSOCIATED WITH TYPE 2 DIABETES MELLITUS, WITH NECROSIS OF BONE (HCC): Primary | ICD-10-CM

## 2022-06-29 DIAGNOSIS — L08.9 DIABETIC FOOT INFECTION (HCC): ICD-10-CM

## 2022-06-29 DIAGNOSIS — I70.244 ATHEROSCLEROSIS OF NATIVE ARTERY OF LEFT LOWER EXTREMITY WITH ULCERATION OF HEEL (HCC): ICD-10-CM

## 2022-06-29 DIAGNOSIS — E11.621 DIABETIC ULCER OF LEFT HEEL ASSOCIATED WITH TYPE 2 DIABETES MELLITUS, WITH NECROSIS OF BONE (HCC): Primary | ICD-10-CM

## 2022-06-29 DIAGNOSIS — E11.628 DIABETIC FOOT INFECTION (HCC): ICD-10-CM

## 2022-06-29 PROCEDURE — 11042 DBRDMT SUBQ TIS 1ST 20SQCM/<: CPT | Performed by: SURGERY

## 2022-06-29 PROCEDURE — 11042 DBRDMT SUBQ TIS 1ST 20SQCM/<: CPT

## 2022-06-29 RX ORDER — LIDOCAINE 50 MG/G
OINTMENT TOPICAL ONCE
Status: CANCELLED | OUTPATIENT
Start: 2022-06-29 | End: 2022-06-29

## 2022-06-29 RX ORDER — LIDOCAINE HYDROCHLORIDE 20 MG/ML
JELLY TOPICAL ONCE
Status: CANCELLED | OUTPATIENT
Start: 2022-06-29 | End: 2022-06-29

## 2022-06-29 RX ORDER — LIDOCAINE 40 MG/G
CREAM TOPICAL ONCE
Status: CANCELLED | OUTPATIENT
Start: 2022-06-29 | End: 2022-06-29

## 2022-06-29 RX ORDER — BACITRACIN, NEOMYCIN, POLYMYXIN B 400; 3.5; 5 [USP'U]/G; MG/G; [USP'U]/G
OINTMENT TOPICAL ONCE
Status: CANCELLED | OUTPATIENT
Start: 2022-06-29 | End: 2022-06-29

## 2022-06-29 RX ORDER — GENTAMICIN SULFATE 1 MG/G
OINTMENT TOPICAL ONCE
Status: CANCELLED | OUTPATIENT
Start: 2022-06-29 | End: 2022-06-29

## 2022-06-29 RX ORDER — SULFAMETHOXAZOLE AND TRIMETHOPRIM 800; 160 MG/1; MG/1
1 TABLET ORAL 2 TIMES DAILY
Qty: 20 TABLET | Refills: 0 | Status: SHIPPED | OUTPATIENT
Start: 2022-06-29 | End: 2022-07-09

## 2022-06-29 RX ORDER — BETAMETHASONE DIPROPIONATE 0.05 %
OINTMENT (GRAM) TOPICAL ONCE
Status: CANCELLED | OUTPATIENT
Start: 2022-06-29 | End: 2022-06-29

## 2022-06-29 RX ORDER — BACITRACIN ZINC AND POLYMYXIN B SULFATE 500; 1000 [USP'U]/G; [USP'U]/G
OINTMENT TOPICAL ONCE
Status: CANCELLED | OUTPATIENT
Start: 2022-06-29 | End: 2022-06-29

## 2022-06-29 RX ORDER — CLOBETASOL PROPIONATE 0.5 MG/G
OINTMENT TOPICAL ONCE
Status: CANCELLED | OUTPATIENT
Start: 2022-06-29 | End: 2022-06-29

## 2022-06-29 RX ORDER — GINSENG 100 MG
CAPSULE ORAL ONCE
Status: CANCELLED | OUTPATIENT
Start: 2022-06-29 | End: 2022-06-29

## 2022-06-29 RX ORDER — LIDOCAINE HYDROCHLORIDE 40 MG/ML
SOLUTION TOPICAL ONCE
Status: CANCELLED | OUTPATIENT
Start: 2022-06-29 | End: 2022-06-29

## 2022-06-29 RX ORDER — LIDOCAINE HYDROCHLORIDE 40 MG/ML
SOLUTION TOPICAL ONCE
Status: COMPLETED | OUTPATIENT
Start: 2022-06-29 | End: 2022-06-29

## 2022-06-29 RX ADMIN — LIDOCAINE HYDROCHLORIDE: 40 SOLUTION TOPICAL at 13:23

## 2022-06-29 NOTE — PROGRESS NOTES
Wound Healing Center Followup Visit Note    Referring Physician : Lucretia Fields MD  [address] [facility] RECORD NUMBER:  [id number]  AGE: 57 y.o. GENDER: male  : 1960  EPISODE DATE:  2022    Subjective:     Chief Complaint   Patient presents with    Wound Check     left heel       HISTORY of PRESENT ILLNESS HPI   Jena Young is a 57 y.o. male who presents today in regards to follow up evaluation and treatment of wound/ulcer. That patient's past medical, family and social hx were reviewed and changes were made if present. History of Wound Context:  The patient has had a wound of bilateral heels which was first noted approximately over a month ago. This has been treated with surgical debridement. On their initial visit to the wound healing center, 21 ,  the patient has noted that the wound has not been improving. The patient has had similar previous wounds in the past.       Pt is not on abx at time of initial visit.     3/22/21 - Wound VAC MWF continue to left heel. Aquacel Ag to right heel. Debrided toenails 1-5 in thickness and length. FU 1 week. IV Abx per Dr. Joseph Michel.     21 - Wound VAC MWF to left heel. Cultures obtained. FU 1 week after visit with Dr. Joseph Michel. PO Abx.  21 - DC wound VAC to left heel. Alginate to wound. FU 1 week  21 - Alginate and gentamicin ointment QOD. Partial WB to heel at 50% to foot with surgical shoe and walker.     5/3/21 - Alginate and gentamicin ointment QOD. MRI left heel     5/10/21 - Alginate and gentamicin ointment QOD. MRI left heel pending.     21 - Alginate and gentamicin ointment QOD. MRI reviewed confirming OM. Discussed HBOT referral and partial calcanectomy. He opts for HBOT consultation.     21 - Alginate and add gentamicin ointment QD. HBOT referral.  Patient hold off on partial calcanectomy. FU 1 week     21 - Alginate and add gentamicin ointment QD.   HBOT referral.  Patient hold off on partial calcanectomy. FU 1 week     6/28/21 - Alginate and add gentamicin ointment QD. HBOT referral.  Patient hold off on partial calcanectomy. FU 1 week. Patient gave me consent (verbal) to speak with his brother Hernan Suero regarding his at home arrangement. HBOT is on hold until patient is at home.     7/12/21 -  Alginate and add gentamicin ointment QD. HBOT referral.  Patient considering partial calcanectomy with flap. FU 1 week.      7/19/21 - Alginate and add gentamicin ointment QD. HBOT referral.  Patient considering partial calcanectomy with flap. FU 1 week.      8/2/21 - Alginate and add gentamicin ointment QD. HBOT referral.  Patient considering partial calcanectomy with flap. FU 1 week.      8/16/21 - Alginate and add gentamicin ointment QD. HBOT referral.  Patient considering partial calcanectomy with flap. FU 1 week.      8/23/21 - Alginate and add gentamicin ointment QD. HBOT referral.  Patient considering partial calcanectomy with flap. FU 1 week.      8/30/21 - Alginate and add gentamicin ointment QD. HBOT referral.  Patient considering partial calcanectomy with flap. FU 1 week.      9/20/21 - Alginate and add gentamicin ointment QD. HBOT referral.  Patient considering partial calcanectomy with flap. FU 1 week.      10/25/21 Alginate and add gentamicin ointment QD. HBOT referral.  Patient considering partial calcanectomy with flap. FU 1 week.      11/1/21 Alginate and add gentamicin ointment QD. HBOT referral.  Patient considering partial calcanectomy with flap. FU 1 week.      11/8/21 - Alginate and add gentamicin ointment QD. HBOT referral.  Patient considering partial calcanectomy with flap. FU 1 week.      11/15/21 - Alginate and add gentamicin ointment QD. HBOT referral.  Patient considering partial calcanectomy with flap. FU 1 week.      11/22/21 -  Alginate and add gentamicin ointment QD. HBOT referral.  Patient considering partial calcanectomy with flap. FU 1 week.    11/29/21 0 Alginate and gentamicin ointment. Possible DC home. HBOT referral.  Patient considering partial calcanectomy with flap. FU 1 week.      12/6/21 - Alginate and gentamicin ointment. Possible DC home. HBOT referral.  Patient considering partial calcanectomy with flap. FU 1 week.      12/13/21 - Alginate and gentamicin ointment. DC home. HBOT referral.  [facility] referral for wound care and PT for GAIT training and safe DME use. Patient considering partial calcanectomy with flap. FU 1 week.      12/20/21 -  Alginate and gentamicin ointment. DC home. HBOT referral.     12/27/21 - HBOT consultation complete - to start HBOT after new year. 2-14-22 patient presents today for evaluation of a left heel wound. Patient transferred here to [facility] due to the fact that he is doing HBO, for convenience due to transportation. He was prior following with Dr. Abdoulaye Flower. Patient overall doing well, no complaints. Tolerating dressings as well as hyperbarics. Physical exam demonstrates vascular intact. Wound appreciated posterior aspect with areas of devitalized nonviable tissue with beefy tissue noted as well. There is no purulence, odor, erythema or increase in temperature. Currently no exposed bone. 2-24-22  Patient overall doing well, no complaints. Tolerating dressings as well as hyperbarics. Physical exam demonstrates vascular intact. Wound appreciated posterior aspect with areas of devitalized nonviable tissue with beefy tissue noted as well. There is no purulence, odor, erythema or increase in temperature. 3-3-22 presents in follow-up. Relates dark area on his left great toenail as well as \"swelling\"  Left lower leg that he noticed recently. Patient denies any known trauma. Patient currently takes aspirin as well as Plavix. Physical exam demonstrates vascular intact.   Wound appreciated posterior aspect with areas of devitalized nonviable tissue with beefy tissue noted as well. There is no purulence, odor, erythema or increase in temperature. Left great toenail proximal lateral small area, subungual hematoma, stable. 2 fluctuant areas adjacent left lower leg anterior lateral aspect. There is no erythema or increase in temperature. After oral consent under sterile technique and utilizing 1% lidocaine plain, 21-gauge needle as well as 11 blade stab incision expressing approximately 20 cc of hematoma formation, this was cultured. Monitor closely if he notices any recurrence recommend [facility] ER for further evaluation    3-28-22 last seen March 3rd, transportation issues. Physical exam, vascular intact. Wound appreciated posterior aspect left heel with areas of devitalized nonviable tissue, increasing/seen. There is no purulence, odor, erythema or increase in temperature. Change treatment to Santyl. Patient would benefit from continued HBO.    4-4-22 Physical exam, vascular intact. Wound appreciated posterior aspect left heel with areas of devitalized nonviable tissue w/ areas of beefy tissue. There is no purulence, odor, erythema or increase in temperature. Santyl. HBO. Improved at present. 4-11-22 Wound appreciated posterior aspect left heel with areas of devitalized nonviable tissue w/ areas of beefy tissue. There is no purulence, odor, erythema or increase in temperature. Santyl. HBO. 4-18-22 Wound appreciated posterior aspect left heel with areas of devitalized nonviable tissue w/ areas of beefy tissue. There is no purulence, odor, erythema or increase in temperature. Santyl. HBO. Discussed possible graft in near future, will get updated vasc studies.     5/9/2022  · Patient did not come last 2 weeks and also did not go for hyperbaric, last week, because of transportation problems  · Patient tells me at one time his podiatrist Abby Fernandes informed him that he may consider skin grafting of the wound  · On today's examination, I can literally feel the bone, but looks fairly clean  · Patient is trying to continue offloading the heel ulcer, and also continuing the hyperbaric oxygen therapy treatments  · The last lower extremity artery Doppler study done in 2021 revealed almost triphasic right ankle Doppler tracings and biphasic left ankle Doppler tracings with adequate flow to the heel and foot for potential tissue healing  · Patient was rescheduled for repeat proximal artery Doppler study by his podiatrist, not done yet I do not see it in the epic  · Inform the patient that last week I discussed with Dr. Casey King regarding continuing the hyperbaric oxygen therapy, possibility of skin grafting  · Discussed with patient and nursing staff, please schedule appointment for the patient to see Dr. Casey King for his input pending return to have his podiatrist back to the wound care center for further management  ·  we will make additional recommendations once the lower extremity artery Doppler studies completed  · All his questions were answered  5/18/22  · Concern that arterial flow is not adequate based off exam today  · Left dp weakly biphasic, PT monophasic  · Will hold hbo for now - reevaluate post angio  · Discussed angiogram - he had previous with Dr Kellie Barahona which addressed his left sfa, pop with plasty using 6x250 DCB impact  · Concern that tibial disease is the primary issue vs. Previous intervention has shut down  5/25/22  · Angio 5/31  · Wound stable heel  5/31/22  · L SFA, pop atherectomy, plasty with 5x250  · L PT plasty 3x80, 2x120  6/22/22   · Wound larger but bone covered with granulation tissue  · L DP strongly biphasic, PT biphasic, no right groin hematoma  · aquacell ag  · Culture  · Consideration for graft in future  · Consideration for hbo in future  6/29/22  · Anciboacter bumani light growth - tx with bactrim  · Wound dose appear better    Wound/Ulcer Pain Timing/Severity: none  Quality of pain: N/A  Severity:  0 / 10 Modifying Factors: None  Associated Signs/Symptoms: none     Ulcer Identification:  Ulcer Type: arterial and diabetic  Contributing Factors: diabetes     Diabetic/Pressure/Non Pressure Ulcers onl y:        PAST MEDICAL HISTORY      Diagnosis Date    Arthritis     Cerebral artery occlusion with cerebral infarction ([address]     Diabetes mellitus ([address]     Type 2    Diabetic foot ulcer with osteomyelitis ([address] 12/21/2021    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 2 diabetes mellitus, with necrosis of bone ([address] 12/21/2021    Hemiparesis affecting left side as late effect of cerebrovascular accident ([address]     LEFT SIDE NON DOMINANT FOLLOWING STROKE    Hypertension     Peripheral vascular angioplasty status 12/21/2021    Ulcer of left heel, with necrosis of bone ([address] 12/27/2021    Ulcer of left heel, with necrosis of muscle ([address] 12/21/2021     Past Surgical History:   Procedure Laterality Date    FINGER AMPUTATION      FOOT DEBRIDEMENT Left 2/16/2021    LEFT FOOT DEBRIDEMENT WITH BONE BIOPSY, WOUND VAC APPLICATION performed by Chalo Mustafa DPM at [place] [address] ARTHROSCOPY      TONSILLECTOMY      TRANSESOPHAGEAL ECHOCARDIOGRAM N/A 2/22/2021    TRANSESOPHAGEAL ECHOCARDIOGRAM WITH BUBBLE STUDY performed by Trupti Baron MD at [facility] N/A 1/4/2021    EGD BIOPSY performed by Anish Feldman DO at [place] [address] reviewed. No pertinent family history.   Social History     Tobacco Use    Smoking status: Former Smoker    Smokeless tobacco: Never Used   Vaping Use    Vaping Use: Never used   Substance Use Topics    Alcohol use: Not Currently     Comment: Former every day drinker for most of adult life    Drug use: No     Allergies   Allergen Reactions    Pcn [Penicillins]      Current Outpatient Medications on File Prior to Encounter   Medication Sig Dispense Refill    gabapentin (NEURONTIN) 100 MG capsule Take 3 capsules by mouth 2 times daily for 30 days. 60 capsule 2    hydroCHLOROthiazide (HYDRODIURIL) 25 MG tablet Take 1 tablet by mouth daily 30 tablet 2    glimepiride (AMARYL) 2 MG tablet Take 1 tablet by mouth every morning (before breakfast) 30 tablet 2    traMADol (ULTRAM) 50 MG tablet Take 50 mg by mouth 2 times daily.  diphenhydrAMINE (BENADRYL ALLERGY) 25 MG capsule Take 25 mg by mouth every 6 hours as needed for Itching      B Complex-C-E-Zn (STRESS B/ZINC) TABS Take 1 tablet by mouth daily      acetaminophen (TYLENOL) 325 MG tablet Take 650 mg by mouth every 6 hours as needed for Pain      ferrous sulfate (IRON 325) 325 (65 Fe) MG tablet Take 325 mg by mouth daily (with breakfast)      vitamin D (ERGOCALCIFEROL) 1.25 MG (54450 UT) CAPS capsule Take 1 capsule by mouth once a week 5 capsule 0    melatonin 3 MG TABS tablet Take 9 mg by mouth nightly as needed      aspirin 81 MG EC tablet Take 81 mg by mouth daily      metFORMIN (GLUCOPHAGE) 500 MG tablet Take 1 tablet by mouth 2 times daily (with meals) 60 tablet 3    atorvastatin (LIPITOR) 40 MG tablet Take 1 tablet by mouth nightly 30 tablet 3    clopidogrel (PLAVIX) 75 MG tablet Take 1 tablet by mouth daily 30 tablet 3     No current facility-administered medications on file prior to encounter.        REVIEW OF SYSTEMS See HPI    Objective:    /76   Pulse 88   Temp 97.6 °F (36.4 °C) (Temporal)   Resp 18   Wt Readings from Last 3 Encounters:   06/22/22 190 lb (86.2 kg)   05/31/22 190 lb (86.2 kg)   05/18/22 195 lb (88.5 kg)     PHYSICAL EXAM  CONSTITUTIONAL:   Awake, alert, cooperative   EYES:  lids and lashes normal   ENT: external ears and nose without lesions   NECK:  supple, symmetrical, trachea midline   SKIN:  Open wound     Assessment:     Problem List Items Addressed This Visit     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 2 diabetes mellitus, with necrosis of bone ([facility] - Primary (Chronic)    Relevant Orders    Initiate Outpatient Wound Care Protocol    Atherosclerosis of native artery of left lower extremity with ulceration of heel (HCC)    Diabetic foot infection ([address]    Relevant Orders    Initiate Outpatient Wound Care Protocol          Pre Debridement Measurements:  Are located in the [facility]  Documentation Flow Sheet  Post Debridement Measurements:  Wound/Ulcer Descriptions are Pre Debridement except measurements:     Wound 12/26/20 Arm Left (Active)   Number of days: 549       Wound 02/15/21 Heel Left (Active)   Number of days: 499       Wound 02/15/21 Heel Right (Active)   Number of days: 499       Wound 03/08/21 Heel Left #1 left heel aquired 12/1/20 (Active)   Wound Image   06/22/22 1324   Wound Etiology Diabetic 02/14/22 1344   Dressing Status New dressing applied 05/25/22 1400   Wound Cleansed Cleansed with saline 05/25/22 1400   Dressing/Treatment Alginate;ABD;Dry dressing 05/31/22 1600   Offloading for Diabetic Foot Ulcers Post op shoe 05/25/22 1400   Wound Length (cm) 5.1 cm 06/29/22 1319   Wound Width (cm) 4.1 cm 06/29/22 1319   Wound Depth (cm) 0.9 cm 06/29/22 1319   Wound Surface Area (cm^2) 20.91 cm^2 06/29/22 1319   Change in Wound Size % (l*w) -72.95 06/29/22 1319   Wound Volume (cm^3) 18.819 cm^3 06/29/22 1319   Wound Healing % -122 06/29/22 1319   Post-Procedure Length (cm) 5.2 cm 06/29/22 1341   Post-Procedure Width (cm) 4.2 cm 06/29/22 1341   Post-Procedure Depth (cm) 0.9 cm 06/29/22 1341   Post-Procedure Surface Area (cm^2) 21.84 cm^2 06/29/22 1341   Post-Procedure Volume (cm^3) 19.656 cm^3 06/29/22 1341   Undermining Starts ___ O'Clock 9 05/25/22 1329   Undermining Ends___ O'Clock 3 05/25/22 1329   Undermining Maxium Distance (cm) Huang@[email or web address] 05/25/22 1329   Wound Assessment Slough;[place]/red 06/22/22 1324   Drainage Amount Moderate 06/22/22 1324   Drainage Description Yellow 06/22/22 1324   Odor None 06/22/22 1324   Adrianne-wound Assessment Intact 06/22/22 1324   Number of days: 478       Wound 03/08/21 Heel Right #2 rt heel aquired 12/1/20 (Active)   Wound Image   04/05/21 0929   Dressing Status New dressing applied 03/18/21 1352   Wound Cleansed Cleansed with saline 04/01/21 1536   Dressing/Treatment Alginate;Collagen;Silicone border 19/07/11 1536   Offloading for Diabetic Foot Ulcers Post op shoe 04/01/21 1536   Wound Length (cm) 0 cm 04/05/21 0929   Wound Width (cm) 0 cm 04/05/21 0929   Wound Depth (cm) 0 cm 04/05/21 0929   Wound Surface Area (cm^2) 0 cm^2 04/05/21 0929   Change in Wound Size % (l*w) 100 04/05/21 0929   Wound Volume (cm^3) 0 cm^3 04/05/21 0929   Wound Healing % 100 04/05/21 0929   Post-Procedure Length (cm) 0 cm 04/05/21 1011   Post-Procedure Width (cm) 0 cm 04/05/21 1011   Post-Procedure Depth (cm) 0 cm 04/05/21 1011   Post-Procedure Surface Area (cm^2) 0 cm^2 04/05/21 1011   Post-Procedure Volume (cm^3) 0 cm^3 04/05/21 1011   Wound Assessment Fibrin 04/01/21 1357   Drainage Amount None 04/01/21 1357   Drainage Description Yellow 03/22/21 0911   Odor None 04/01/21 1357   Adrianne-wound Assessment Maceration 04/01/21 1357   Number of days: 478       Wound 05/12/22 Pretibial Left #2 (Active)   Wound Image   06/22/22 1324   Wound Etiology Skin Tear 05/12/22 1306   Dressing Status New dressing applied 06/22/22 1410   Wound Cleansed Cleansed with saline 06/22/22 1410   Dressing/Treatment Alginate with Ag;ABD;Roll gauze 06/22/22 1410   Offloading for Diabetic Foot Ulcers Post op shoe 06/22/22 1410   Wound Length (cm) 0.1 cm 06/29/22 1319   Wound Width (cm) 0.1 cm 06/29/22 1319   Wound Depth (cm) 0.1 cm 06/29/22 1319   Wound Surface Area (cm^2) 0.01 cm^2 06/29/22 1319   Change in Wound Size % (l*w) 99.36 06/29/22 1319   Wound Volume (cm^3) 0.001 cm^3 06/29/22 1319   Wound Healing % 99 06/29/22 1319   Post-Procedure Length (cm) 2.9 cm 06/22/22 1402   Post-Procedure Width (cm) 1.1 cm 06/22/22 1402   Post-Procedure Depth (cm) 0.2 cm 06/22/22 1402   Post-Procedure Surface Area (cm^2) 3.19 cm^2 06/22/22 1402   Post-Procedure Volume (cm^3)       May be partial weight bearing up to 50% on left with surgical shoe. Start workup for graft      [facility] visit: ______________ 1 week Dr MENDOZA______________  (Please note your next appointment above and if you are unable to keep, kindly give a 24 hour notice.  Thank you.)     Physician signature:__________________________        If you experience any of the following, please call the [address] [place] [address] [employer] during business hours:     * Increase in Pain  * Temperature over 101  * Increase in drainage from your wound  * Drainage with a foul odor  * Bleeding  * Increase in swelling  * Need for compression bandage changes due to slippage, breakthrough drainage.     If you need medical attention outside of the business hours of the [address] [place] [address] [employer] please contact your PCP or go to the nearest emergency room.           Electronically signed by Kd Menjivar MD on 6/29/2022 at 1:43 PM

## 2022-07-06 VITALS
HEIGHT: 71 IN | OXYGEN SATURATION: 97 % | WEIGHT: 197 LBS | HEART RATE: 95 BPM | BODY MASS INDEX: 27.58 KG/M2 | SYSTOLIC BLOOD PRESSURE: 118 MMHG | TEMPERATURE: 97.5 F | DIASTOLIC BLOOD PRESSURE: 80 MMHG

## 2022-07-13 ENCOUNTER — HOSPITAL ENCOUNTER (OUTPATIENT)
Dept: WOUND CARE | Age: 62
Discharge: HOME | End: 2022-07-13
Payer: COMMERCIAL

## 2022-07-13 VITALS
WEIGHT: 190 LBS | TEMPERATURE: 96.9 F | DIASTOLIC BLOOD PRESSURE: 62 MMHG | HEIGHT: 71 IN | SYSTOLIC BLOOD PRESSURE: 122 MMHG | HEART RATE: 84 BPM | BODY MASS INDEX: 26.6 KG/M2 | RESPIRATION RATE: 18 BRPM

## 2022-07-13 DIAGNOSIS — I70.244 ATHEROSCLEROSIS OF NATIVE ARTERY OF LEFT LOWER EXTREMITY WITH ULCERATION OF HEEL (HCC): ICD-10-CM

## 2022-07-13 DIAGNOSIS — L08.9 DIABETIC FOOT INFECTION (HCC): ICD-10-CM

## 2022-07-13 DIAGNOSIS — E11.621 DIABETIC ULCER OF LEFT HEEL ASSOCIATED WITH TYPE 2 DIABETES MELLITUS, WITH NECROSIS OF BONE (HCC): Primary | ICD-10-CM

## 2022-07-13 DIAGNOSIS — E11.628 DIABETIC FOOT INFECTION (HCC): ICD-10-CM

## 2022-07-13 DIAGNOSIS — L97.424 DIABETIC ULCER OF LEFT HEEL ASSOCIATED WITH TYPE 2 DIABETES MELLITUS, WITH NECROSIS OF BONE (HCC): Primary | ICD-10-CM

## 2022-07-13 PROCEDURE — 11042 DBRDMT SUBQ TIS 1ST 20SQCM/<: CPT

## 2022-07-13 PROCEDURE — 6370000000 HC RX 637 (ALT 250 FOR IP): Performed by: SURGERY

## 2022-07-13 PROCEDURE — 11042 DBRDMT SUBQ TIS 1ST 20SQCM/<: CPT | Performed by: SURGERY

## 2022-07-13 RX ORDER — LIDOCAINE HYDROCHLORIDE 40 MG/ML
SOLUTION TOPICAL ONCE
Status: COMPLETED | OUTPATIENT
Start: 2022-07-13 | End: 2022-07-13

## 2022-07-13 RX ORDER — LIDOCAINE 50 MG/G
OINTMENT TOPICAL ONCE
Status: CANCELLED | OUTPATIENT
Start: 2022-07-13 | End: 2022-07-13

## 2022-07-13 RX ORDER — LIDOCAINE 40 MG/G
CREAM TOPICAL ONCE
Status: CANCELLED | OUTPATIENT
Start: 2022-07-13 | End: 2022-07-13

## 2022-07-13 RX ORDER — BETAMETHASONE DIPROPIONATE 0.05 %
OINTMENT (GRAM) TOPICAL ONCE
Status: CANCELLED | OUTPATIENT
Start: 2022-07-13 | End: 2022-07-13

## 2022-07-13 RX ORDER — GINSENG 100 MG
CAPSULE ORAL ONCE
Status: CANCELLED | OUTPATIENT
Start: 2022-07-13 | End: 2022-07-13

## 2022-07-13 RX ORDER — CLOBETASOL PROPIONATE 0.5 MG/G
OINTMENT TOPICAL ONCE
Status: CANCELLED | OUTPATIENT
Start: 2022-07-13 | End: 2022-07-13

## 2022-07-13 RX ORDER — GENTAMICIN SULFATE 1 MG/G
OINTMENT TOPICAL ONCE
Status: CANCELLED | OUTPATIENT
Start: 2022-07-13 | End: 2022-07-13

## 2022-07-13 RX ORDER — BACITRACIN, NEOMYCIN, POLYMYXIN B 400; 3.5; 5 [USP'U]/G; MG/G; [USP'U]/G
OINTMENT TOPICAL ONCE
Status: CANCELLED | OUTPATIENT
Start: 2022-07-13 | End: 2022-07-13

## 2022-07-13 RX ORDER — LIDOCAINE HYDROCHLORIDE 20 MG/ML
JELLY TOPICAL ONCE
Status: CANCELLED | OUTPATIENT
Start: 2022-07-13 | End: 2022-07-13

## 2022-07-13 RX ORDER — BACITRACIN ZINC AND POLYMYXIN B SULFATE 500; 1000 [USP'U]/G; [USP'U]/G
OINTMENT TOPICAL ONCE
Status: CANCELLED | OUTPATIENT
Start: 2022-07-13 | End: 2022-07-13

## 2022-07-13 RX ORDER — LIDOCAINE HYDROCHLORIDE 40 MG/ML
SOLUTION TOPICAL ONCE
Status: CANCELLED | OUTPATIENT
Start: 2022-07-13 | End: 2022-07-13

## 2022-07-13 RX ADMIN — LIDOCAINE HYDROCHLORIDE 10 ML: 40 SOLUTION TOPICAL at 13:44

## 2022-07-13 ASSESSMENT — PAIN SCALES - GENERAL: PAINLEVEL_OUTOF10: 0

## 2022-07-13 NOTE — PROGRESS NOTES
Wound Healing Center Followup Visit Note    Referring Physician : Archana Shelton MD  [id number] [facility] RECORD NUMBER:  [id number]  AGE: 57 y.o. GENDER: male  : 1960  EPISODE DATE:  2022    Subjective:     Chief Complaint   Patient presents with    Wound Check     left leg      HISTORY of PRESENT ILLNESS HPI   Yung Patel is a 57 y.o. male who presents today in regards to follow up evaluation and treatment of wound/ulcer. That patient's past medical, family and social hx were reviewed and changes were made if present. History of Wound Context:  The patient has had a wound of bilateral heels which was first noted approximately over a month ago. This has been treated with surgical debridement. On their initial visit to the wound healing center, 21 ,  the patient has noted that the wound has not been improving. The patient has had similar previous wounds in the past.       Pt is not on abx at time of initial visit.     3/22/21 - Wound VAC MWF continue to left heel. Aquacel Ag to right heel. Debrided toenails 1-5 in thickness and length. FU 1 week. IV Abx per Dr. Brandon Cárdenas.     21 - Wound VAC MWF to left heel. Cultures obtained. FU 1 week after visit with Dr. Brandon Cárdenas. PO Abx.  21 - DC wound VAC to left heel. Alginate to wound. FU 1 week  21 - Alginate and gentamicin ointment QOD. Partial WB to heel at 50% to foot with surgical shoe and walker.     5/3/21 - Alginate and gentamicin ointment QOD. MRI left heel     5/10/21 - Alginate and gentamicin ointment QOD. MRI left heel pending.     21 - Alginate and gentamicin ointment QOD. MRI reviewed confirming OM. Discussed HBOT referral and partial calcanectomy. He opts for HBOT consultation.     21 - Alginate and add gentamicin ointment QD. HBOT referral.  Patient hold off on partial calcanectomy. FU 1 week     21 - Alginate and add gentamicin ointment QD.   HBOT referral.  Patient hold off on partial calcanectomy. FU 1 week     6/28/21 - Alginate and add gentamicin ointment QD. HBOT referral.  Patient hold off on partial calcanectomy. FU 1 week. Patient gave me consent (verbal) to speak with his brother Mick Harrington regarding his at home arrangement. HBOT is on hold until patient is at home.     7/12/21 -  Alginate and add gentamicin ointment QD. HBOT referral.  Patient considering partial calcanectomy with flap. FU 1 week.      7/19/21 - Alginate and add gentamicin ointment QD. HBOT referral.  Patient considering partial calcanectomy with flap. FU 1 week.      8/2/21 - Alginate and add gentamicin ointment QD. HBOT referral.  Patient considering partial calcanectomy with flap. FU 1 week.      8/16/21 - Alginate and add gentamicin ointment QD. HBOT referral.  Patient considering partial calcanectomy with flap. FU 1 week.      8/23/21 - Alginate and add gentamicin ointment QD. HBOT referral.  Patient considering partial calcanectomy with flap. FU 1 week.      8/30/21 - Alginate and add gentamicin ointment QD. HBOT referral.  Patient considering partial calcanectomy with flap. FU 1 week.      9/20/21 - Alginate and add gentamicin ointment QD. HBOT referral.  Patient considering partial calcanectomy with flap. FU 1 week.      10/25/21 Alginate and add gentamicin ointment QD. HBOT referral.  Patient considering partial calcanectomy with flap. FU 1 week.      11/1/21 Alginate and add gentamicin ointment QD. HBOT referral.  Patient considering partial calcanectomy with flap. FU 1 week.      11/8/21 - Alginate and add gentamicin ointment QD. HBOT referral.  Patient considering partial calcanectomy with flap. FU 1 week.      11/15/21 - Alginate and add gentamicin ointment QD. HBOT referral.  Patient considering partial calcanectomy with flap. FU 1 week.      11/22/21 -  Alginate and add gentamicin ointment QD. HBOT referral.  Patient considering partial calcanectomy with flap. FU 1 week.    11/29/21 0 Alginate and gentamicin ointment. Possible DC home. HBOT referral.  Patient considering partial calcanectomy with flap. FU 1 week.      12/6/21 - Alginate and gentamicin ointment. Possible DC home. HBOT referral.  Patient considering partial calcanectomy with flap. FU 1 week.      12/13/21 - Alginate and gentamicin ointment. DC home. HBOT referral.  [facility] referral for wound care and PT for GAIT training and safe DME use. Patient considering partial calcanectomy with flap. FU 1 week.      12/20/21 -  Alginate and gentamicin ointment. DC home. HBOT referral.     12/27/21 - HBOT consultation complete - to start HBOT after new year. 2-14-22 patient presents today for evaluation of a left heel wound. Patient transferred here to [facility] due to the fact that he is doing HBO, for convenience due to transportation. He was prior following with Dr. Jazmyne Lafleur. Patient overall doing well, no complaints. Tolerating dressings as well as hyperbarics. Physical exam demonstrates vascular intact. Wound appreciated posterior aspect with areas of devitalized nonviable tissue with beefy tissue noted as well. There is no purulence, odor, erythema or increase in temperature. Currently no exposed bone. 2-24-22  Patient overall doing well, no complaints. Tolerating dressings as well as hyperbarics. Physical exam demonstrates vascular intact. Wound appreciated posterior aspect with areas of devitalized nonviable tissue with beefy tissue noted as well. There is no purulence, odor, erythema or increase in temperature. 3-3-22 presents in follow-up. Relates dark area on his left great toenail as well as \"swelling\"  Left lower leg that he noticed recently. Patient denies any known trauma. Patient currently takes aspirin as well as Plavix. Physical exam demonstrates vascular intact.   Wound appreciated posterior aspect with areas of devitalized nonviable tissue with beefy tissue noted as well. There is no purulence, odor, erythema or increase in temperature. Left great toenail proximal lateral small area, subungual hematoma, stable. 2 fluctuant areas adjacent left lower leg anterior lateral aspect. There is no erythema or increase in temperature. After oral consent under sterile technique and utilizing 1% lidocaine plain, 21-gauge needle as well as 11 blade stab incision expressing approximately 20 cc of hematoma formation, this was cultured. Monitor closely if he notices any recurrence recommend Na Jacobsonthorn  for further evaluation    3-28-22 last seen March 3rd, transportation issues. Physical exam, vascular intact. Wound appreciated posterior aspect left heel with areas of devitalized nonviable tissue, increasing/seen. There is no purulence, odor, erythema or increase in temperature. Change treatment to Santyl. Patient would benefit from continued HBO.    4-4-22 Physical exam, vascular intact. Wound appreciated posterior aspect left heel with areas of devitalized nonviable tissue w/ areas of beefy tissue. There is no purulence, odor, erythema or increase in temperature. Santyl. HBO. Improved at present. 4-11-22 Wound appreciated posterior aspect left heel with areas of devitalized nonviable tissue w/ areas of beefy tissue. There is no purulence, odor, erythema or increase in temperature. Santyl. HBO. 4-18-22 Wound appreciated posterior aspect left heel with areas of devitalized nonviable tissue w/ areas of beefy tissue. There is no purulence, odor, erythema or increase in temperature. Santyl. HBO. Discussed possible graft in near future, will get updated vasc studies.     5/9/2022  · Patient did not come last 2 weeks and also did not go for hyperbaric, last week, because of transportation problems  · Patient tells me at one time his podiatrist Bella Spear informed him that he may consider skin grafting of the wound  · On today's examination, I can literally feel the bone, but looks fairly clean  · Patient is trying to continue offloading the heel ulcer, and also continuing the hyperbaric oxygen therapy treatments  · The last lower extremity artery Doppler study done in 2021 revealed almost triphasic right ankle Doppler tracings and biphasic left ankle Doppler tracings with adequate flow to the heel and foot for potential tissue healing  · Patient was rescheduled for repeat proximal artery Doppler study by his podiatrist, not done yet I do not see it in the epic  · Inform the patient that last week I discussed with Dr. Ashley Vang regarding continuing the hyperbaric oxygen therapy, possibility of skin grafting  · Discussed with patient and nursing staff, please schedule appointment for the patient to see Dr. Ashley Vang for his input pending return to have his podiatrist back to the wound care center for further management  ·  we will make additional recommendations once the lower extremity artery Doppler studies completed  · All his questions were answered  5/18/22  · Concern that arterial flow is not adequate based off exam today  · Left dp weakly biphasic, PT monophasic  · Will hold hbo for now - reevaluate post angio  · Discussed angiogram - he had previous with Dr Inocencia Pichardo which addressed his left sfa, pop with plasty using 6x250 DCB impact  · Concern that tibial disease is the primary issue vs. Previous intervention has shut down  5/25/22  · Angio 5/31  · Wound stable heel  5/31/22  · L SFA, pop atherectomy, plasty with 5x250  · L PT plasty 3x80, 2x120  6/22/22   · Wound larger but bone covered with granulation tissue  · L DP strongly biphasic, PT biphasic, no right groin hematoma  · aquacell ag  · Culture  · Consideration for graft in future  · Consideration for hbo in future  6/29/22  · Anciboacter bumani light growth - tx with bactrim  · Wound dose appear better  7/13/22  · Wound smaller  · Appearance better  · Pt having transportation issues - will transition him back to [facility]  · Will also have reassessed for hbo at [facility] Pain Timing/Severity: none  Quality of pain: N/A  Severity:  0 / 10   Modifying Factors: None  Associated Signs/Symptoms: none     Ulcer Identification:  Ulcer Type: arterial and diabetic  Contributing Factors: diabetes     Diabetic/Pressure/Non Pressure Ulcers onl y:        PAST MEDICAL HISTORY      Diagnosis Date    Arthritis     Bilateral carpal tunnel syndrome 2016    Cerebral artery occlusion with cerebral infarction ([address]     Chronic back pain     CVA (cerebral vascular accident) ([address] 11/2015, 2017    Diabetes mellitus ([address]     Type 2    Diabetic foot ulcer with osteomyelitis ([address] 12/21/2021    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 2 diabetes mellitus, with necrosis of bone ([address] 12/21/2021    Hemiparesis affecting left side as late effect of cerebrovascular accident ([address]     LEFT SIDE NON DOMINANT FOLLOWING STROKE    Hyperlipidemia     Hypertension     Peripheral vascular angioplasty status 12/21/2021    Ulcer of left heel, with necrosis of bone ([address] 12/27/2021    Ulcer of left heel, with necrosis of muscle ([address] 12/21/2021    Vitamin D deficiency      Past Surgical History:   Procedure Laterality Date    CARPAL TUNNEL RELEASE  10/01/2016    Dr. Aleah Concepcion Left 2/16/2021    LEFT FOOT DEBRIDEMENT WITH BONE BIOPSY, WOUND VAC APPLICATION performed by Nathan Lizarraga DPM at [address] [facility]    TRANSESOPHAGEAL ECHOCARDIOGRAM N/A 2/22/2021    TRANSESOPHAGEAL ECHOCARDIOGRAM WITH BUBBLE STUDY performed by Lee Robbins MD at [address] [place] [address] N/A 1/4/2021    EGD BIOPSY performed by Cathi Lowe DO at [place] [id number] reviewed. No pertinent family history.   Social History     Tobacco Use    Smoking status: Former Smoker     Types: Cigarettes Readings from Last 3 Encounters:   07/13/22 190 lb (86.2 kg)   11/06/20 197 lb (89.4 kg)   06/22/22 190 lb (86.2 kg)     PHYSICAL EXAM  CONSTITUTIONAL:   Awake, alert, cooperative   EYES:  lids and lashes normal   ENT: external ears and nose without lesions   NECK:  supple, symmetrical, trachea midline   SKIN:  Open wound     Assessment:     Problem List Items Addressed This Visit     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 2 diabetes mellitus, with necrosis of bone ([address] - Primary (Chronic)    Relevant Orders    Initiate Outpatient Wound Care Protocol    Atherosclerosis of native artery of left lower extremity with ulceration of heel ([address]    Diabetic foot infection ([address]    Relevant Orders    Initiate Outpatient Wound Care Protocol          Pre Debridement Measurements:  Are located in the [place]  Documentation Flow Sheet  Post Debridement Measurements:  Wound/Ulcer Descriptions are Pre Debridement except measurements:     Wound 12/26/20 Arm Left (Active)   Number of days: 563       Wound 02/15/21 Heel Left (Active)   Number of days: 513       Wound 02/15/21 Heel Right (Active)   Number of days: 881       Wound 03/08/21 Heel Left #1 left heel aquired 12/1/20 (Active)   Wound Image   06/22/22 1324   Wound Etiology Diabetic 02/14/22 1344   Dressing Status New dressing applied 05/25/22 1400   Wound Cleansed Cleansed with saline 05/25/22 1400   Dressing/Treatment Alginate;ABD;Dry dressing 05/25/22 1400   Offloading for Diabetic Foot Ulcers Post op shoe 05/25/22 1400   Wound Length (cm) 4.7 cm 07/13/22 1341   Wound Width (cm) 3 cm 07/13/22 1341   Wound Depth (cm) 0.9 cm 07/13/22 1341   Wound Surface Area (cm^2) 14.1 cm^2 07/13/22 1341   Change in Wound Size % (l*w) -16.63 07/13/22 1341   Wound Volume (cm^3) 12.69 cm^3 07/13/22 1341   Wound Healing % -50 07/13/22 1341   Post-Procedure Length (cm) 4.9 cm 07/13/22 1422   Post-Procedure Width (cm) 3 cm 07/13/22 1422   Post-Procedure Depth (cm) 1 cm 07/13/22 1422   Post-Procedure Surface Area (cm^2) 14.7 cm^2 07/13/22 1422   Post-Procedure Volume (cm^3) 14.7 cm^3 07/13/22 1422   Undermining Starts ___ O'Clock 9 05/25/22 1329   Undermining Ends___ O'Clock 3 05/25/22 1329   Undermining Maxium Distance (cm) [employer] 05/25/22 1329   Wound Assessment Fibrin 07/13/22 1341   Drainage Amount Large 07/13/22 1341   Drainage Description Yellow 07/13/22 1341   Odor None 07/13/22 1341   Adrianne-wound Assessment Dry/flaky 07/13/22 1341   Number of days: 492       Wound 03/08/21 Heel Right #2 rt heel aquired 12/1/20 (Active)   Wound Image   04/05/21 0929   Dressing Status New dressing applied 03/18/21 1352   Wound Cleansed Cleansed with saline 04/01/21 1536   Dressing/Treatment Alginate;Collagen;Silicone border 27/37/60 1536   Offloading for Diabetic Foot Ulcers Post op shoe 04/01/21 1536   Wound Length (cm) 0 cm 04/05/21 0929   Wound Width (cm) 0 cm 04/05/21 0929   Wound Depth (cm) 0 cm 04/05/21 0929   Wound Surface Area (cm^2) 0 cm^2 04/05/21 0929   Change in Wound Size % (l*w) 100 04/05/21 0929   Wound Volume (cm^3) 0 cm^3 04/05/21 0929   Wound Healing % 100 04/05/21 0929   Post-Procedure Length (cm) 0 cm 04/05/21 1011   Post-Procedure Width (cm) 0 cm 04/05/21 1011   Post-Procedure Depth (cm) 0 cm 04/05/21 1011   Post-Procedure Surface Area (cm^2) 0 cm^2 04/05/21 1011   Post-Procedure Volume (cm^3) 0 cm^3 04/05/21 1011   Wound Assessment Fibrin 04/01/21 1357   Drainage Amount None 04/01/21 1357   Drainage Description Yellow 03/22/21 0911   Odor None 04/01/21 1357   Adrianne-wound Assessment Maceration 04/01/21 1357   Number of days: 492       Wound 05/12/22 Pretibial Left #2 (Active)   Wound Image   07/13/22 1341   Wound Etiology Skin Tear 05/12/22 1306   Dressing Status New dressing applied 06/22/22 1410   Wound Cleansed Cleansed with saline 06/22/22 1410   Dressing/Treatment Alginate with Ag;ABD;Roll gauze 06/22/22 1410   Offloading for Diabetic Foot Ulcers Post op shoe 06/22/22 1410 Wound Length (cm) 0 cm 07/13/22 1341   Wound Width (cm) 0 cm 07/13/22 1341   Wound Depth (cm) 0 cm 07/13/22 1341   Wound Surface Area (cm^2) 0 cm^2 07/13/22 1341   Change in Wound Size % (l*w) 100 07/13/22 1341   Wound Volume (cm^3) 0 cm^3 07/13/22 1341   Wound Healing % 100 07/13/22 1341   Post-Procedure Length (cm) 0 cm 07/13/22 1422   Post-Procedure Width (cm) 0 cm 07/13/22 1422   Post-Procedure Depth (cm) 0 cm 07/13/22 1422   Post-Procedure Surface Area (cm^2) 0 cm^2 07/13/22 1422   Post-Procedure Volume (cm^3) 0 cm^3 07/13/22 1422   Wound Assessment Dry 06/29/22 1319   Drainage Amount None 07/13/22 1341   Drainage Description Serous 06/29/22 1319   Odor None 06/22/22 1324   Adrianne-wound Assessment Intact 06/29/22 1319   Number of days: 62     Incision 02/19/21 Groin Right (Active)   Number of days: 509       Procedure Note  Indications:  Based on my examination of this patient's wound(s)/ulcer(s) today, debridement is required to promote healing and evaluate the wound base. Performed by: Marcela Spring MD    Consent obtained:  Yes    Time out taken:  Yes    Pain Control: Anesthetic  Anesthetic: 4% Lidocaine Liquid Topical     Debridement:Excisional Debridement    Using curette the wound(s)/ulcer(s) was/were sharply debrided down through and including the removal of epidermis, dermis and subcutaneous tissue. Devitalized Tissue Debrided:  fibrin, biofilm, slough and exudate to stimulate bleeding to promote healing, post debridement good bleeding base and wound edges noted    Wound/Ulcer #: 1    Percent of Wound/Ulcer Debrided: 100%    Total Surface Area Debrided:  14 sq cm     Estimated Blood Loss:  Minimal  Hemostasis Achieved:  by pressure    Procedural Pain:  4  / 10   Post Procedural Pain:  2 / 10     Response to treatment:  Well tolerated by patient.        Plan:   Treatment Note please see attached Discharge Instructions    Written patient dismissal instructions given to patient and signed by patient or POA. Discharge Instructions       Visit Discharge/Physician Orders     Assessment of pain at discharge: moderate     Anesthetic used: 4% liquid lidocaine solution      Discharge to:      Left via:ambulance     Accompanied by: self     ECF[facility]HHA: [facility]      Dressing Orders:  Cleanse left heel ulcer with normal saline solution, apply plain alginate Ag and dry dressing and change every day.     Treatment Orders:   Wear prevalon boots or heel ayo while in bed.       May be partial weight bearing up to 50% on left with surgical shoe.     [facility] followup visit: __ 1 week Dr Alexandro López (Bevely Pal for transportation)_____________  (Please note your next appointment above and if you are unable to keep, kindly give a 24 hour notice.  Thank you.)     Physician signature:__________________________        If you experience any of the following, please call the [facility] during business hours:     * Increase in Pain  * Temperature over 101  * Increase in drainage from your wound  * Drainage with a foul odor  * Bleeding  * Increase in swelling  * Need for compression bandage changes due to slippage, breakthrough drainage.     If you need medical attention outside of the business hours of the [email or web address] [address] please contact your PCP or go to the nearest emergency room.           Electronically signed by Aide Lay MD on 7/13/2022 at 2:25 PM

## 2022-07-18 NOTE — DISCHARGE INSTRUCTIONS
Visit Discharge/Physician Orders     Assessment of pain at discharge: moderate     Anesthetic used: 4% liquid lidocaine solution     Discharge to:     Left via:ambulance     Accompanied by: self     ECF[facility]HHA: [facility]     Dressing Orders:  Cleanse left heel ulcer with normal saline solution, apply plain alginate Ag and dry dressing and change every day. Treatment Orders:   Wear prevalon boots or heel ayo while in bed. May be partial weight bearing up to 50% on left with surgical shoe. Culture taken in clinic today  Xray ordered. [facility] followup visit: __ 1 week Dr Al Speak ____________  (Please note your next appointment above and if you are unable to keep, kindly give a 24 hour notice. Thank you.)     Physician signature:__________________________        If you experience any of the following, please call the [facility] during business hours:     * Increase in Pain  * Temperature over 101  * Increase in drainage from your wound  * Drainage with a foul odor  * Bleeding  * Increase in swelling  * Need for compression bandage changes due to slippage, breakthrough drainage. If you need medical attention outside of the business hours of the [address] please contact your PCP or go to the nearest emergency room.

## 2022-07-21 RX ORDER — ATORVASTATIN CALCIUM 40 MG/1
40 TABLET, FILM COATED ORAL NIGHTLY
Qty: 30 TABLET | Refills: 3 | Status: SHIPPED | OUTPATIENT
Start: 2022-07-21

## 2022-07-25 ENCOUNTER — HOSPITAL ENCOUNTER (OUTPATIENT)
Dept: WOUND CARE | Age: 62
Discharge: HOME | End: 2022-07-25
Payer: COMMERCIAL

## 2022-07-25 VITALS
HEART RATE: 101 BPM | SYSTOLIC BLOOD PRESSURE: 128 MMHG | RESPIRATION RATE: 18 BRPM | TEMPERATURE: 98 F | DIASTOLIC BLOOD PRESSURE: 64 MMHG

## 2022-07-25 DIAGNOSIS — E11.628 DIABETIC FOOT INFECTION (HCC): Primary | ICD-10-CM

## 2022-07-25 DIAGNOSIS — L08.9 DIABETIC FOOT INFECTION (HCC): Primary | ICD-10-CM

## 2022-07-25 DIAGNOSIS — L97.424 DIABETIC ULCER OF LEFT HEEL ASSOCIATED WITH TYPE 2 DIABETES MELLITUS, WITH NECROSIS OF BONE (HCC): ICD-10-CM

## 2022-07-25 DIAGNOSIS — E11.621 DIABETIC ULCER OF LEFT HEEL ASSOCIATED WITH TYPE 2 DIABETES MELLITUS, WITH NECROSIS OF BONE (HCC): ICD-10-CM

## 2022-07-25 PROCEDURE — 6370000000 HC RX 637 (ALT 250 FOR IP): Performed by: SURGERY

## 2022-07-25 PROCEDURE — 87186 SC STD MICRODIL/AGAR DIL: CPT

## 2022-07-25 PROCEDURE — 87070 CULTURE OTHR SPECIMN AEROBIC: CPT

## 2022-07-25 PROCEDURE — 87077 CULTURE AEROBIC IDENTIFY: CPT

## 2022-07-25 PROCEDURE — 87075 CULTR BACTERIA EXCEPT BLOOD: CPT

## 2022-07-25 PROCEDURE — 11042 DBRDMT SUBQ TIS 1ST 20SQCM/<: CPT

## 2022-07-25 PROCEDURE — 87205 SMEAR GRAM STAIN: CPT

## 2022-07-25 RX ORDER — BACITRACIN ZINC AND POLYMYXIN B SULFATE 500; 1000 [USP'U]/G; [USP'U]/G
OINTMENT TOPICAL ONCE
Status: CANCELLED | OUTPATIENT
Start: 2022-07-25 | End: 2022-07-25

## 2022-07-25 RX ORDER — BETAMETHASONE DIPROPIONATE 0.05 %
OINTMENT (GRAM) TOPICAL ONCE
Status: CANCELLED | OUTPATIENT
Start: 2022-07-25 | End: 2022-07-25

## 2022-07-25 RX ORDER — LIDOCAINE HYDROCHLORIDE 20 MG/ML
JELLY TOPICAL ONCE
Status: CANCELLED | OUTPATIENT
Start: 2022-07-25 | End: 2022-07-25

## 2022-07-25 RX ORDER — CLOBETASOL PROPIONATE 0.5 MG/G
OINTMENT TOPICAL ONCE
Status: CANCELLED | OUTPATIENT
Start: 2022-07-25 | End: 2022-07-25

## 2022-07-25 RX ORDER — GINSENG 100 MG
CAPSULE ORAL ONCE
Status: CANCELLED | OUTPATIENT
Start: 2022-07-25 | End: 2022-07-25

## 2022-07-25 RX ORDER — BACITRACIN, NEOMYCIN, POLYMYXIN B 400; 3.5; 5 [USP'U]/G; MG/G; [USP'U]/G
OINTMENT TOPICAL ONCE
Status: CANCELLED | OUTPATIENT
Start: 2022-07-25 | End: 2022-07-25

## 2022-07-25 RX ORDER — LIDOCAINE HYDROCHLORIDE 40 MG/ML
SOLUTION TOPICAL ONCE
Status: COMPLETED | OUTPATIENT
Start: 2022-07-25 | End: 2022-07-25

## 2022-07-25 RX ORDER — LIDOCAINE HYDROCHLORIDE 40 MG/ML
SOLUTION TOPICAL ONCE
Status: CANCELLED | OUTPATIENT
Start: 2022-07-25 | End: 2022-07-25

## 2022-07-25 RX ORDER — LIDOCAINE 40 MG/G
CREAM TOPICAL ONCE
Status: CANCELLED | OUTPATIENT
Start: 2022-07-25 | End: 2022-07-25

## 2022-07-25 RX ORDER — GENTAMICIN SULFATE 1 MG/G
OINTMENT TOPICAL ONCE
Status: CANCELLED | OUTPATIENT
Start: 2022-07-25 | End: 2022-07-25

## 2022-07-25 RX ORDER — LIDOCAINE 50 MG/G
OINTMENT TOPICAL ONCE
Status: CANCELLED | OUTPATIENT
Start: 2022-07-25 | End: 2022-07-25

## 2022-07-25 RX ADMIN — LIDOCAINE HYDROCHLORIDE: 40 SOLUTION TOPICAL at 13:17

## 2022-07-25 NOTE — PROGRESS NOTES
Wound Healing Center Followup Visit Note    Referring Physician : Thor Stone MD  [id number] [facility] RECORD NUMBER:  [id number]  AGE: 57 y.o. GENDER: male  : 1960  EPISODE DATE:  2022    Subjective:     Chief Complaint   Patient presents with    Wound Check     Left foot      HISTORY of PRESENT ILLNESS HPI   Beatrice Mcallister is a 57 y.o. male who presents today in regards to follow up evaluation and treatment of wound/ulcer. That patient's past medical, family and social hx were reviewed and changes were made if present. History of Wound Context:  The patient has had a wound of bilateral heels which was first noted approximately over a month ago. This has been treated with surgical debridement. On their initial visit to the wound healing center, 21 ,  the patient has noted that the wound has not been improving. The patient has had similar previous wounds in the past.       Pt is not on abx at time of initial visit. 3/22/21 - Wound VAC MWF continue to left heel. Aquacel Ag to right heel. Debrided toenails 1-5 in thickness and length. FU 1 week. IV Abx per Dr. Walter Arzola. 21 - Wound VAC MWF to left heel. Cultures obtained. FU 1 week after visit with Dr. Walter Arzola. PO Abx.  21 - DC wound VAC to left heel. Alginate to wound. FU 1 week  21 - Alginate and gentamicin ointment QOD. Partial WB to heel at 50% to foot with surgical shoe and walker. 5/3/21 - Alginate and gentamicin ointment QOD. MRI left heel     5/10/21 - Alginate and gentamicin ointment QOD. MRI left heel pending. 21 - Alginate and gentamicin ointment QOD. MRI reviewed confirming OM. Discussed HBOT referral and partial calcanectomy. He opts for HBOT consultation. 21 - Alginate and add gentamicin ointment QD. HBOT referral.  Patient hold off on partial calcanectomy. FU 1 week     21 - Alginate and add gentamicin ointment QD.   HBOT referral.  Patient hold off on partial calcanectomy. FU 1 week     6/28/21 - Alginate and add gentamicin ointment QD. HBOT referral.  Patient hold off on partial calcanectomy. FU 1 week. Patient gave me consent (verbal) to speak with his brother Luba Hassan regarding his at home arrangement. HBOT is on hold until patient is at home. 7/12/21 -  Alginate and add gentamicin ointment QD. HBOT referral.  Patient considering partial calcanectomy with flap. FU 1 week. 7/19/21 - Alginate and add gentamicin ointment QD. HBOT referral.  Patient considering partial calcanectomy with flap. FU 1 week. 8/2/21 - Alginate and add gentamicin ointment QD. HBOT referral.  Patient considering partial calcanectomy with flap. FU 1 week. 8/16/21 - Alginate and add gentamicin ointment QD. HBOT referral.  Patient considering partial calcanectomy with flap. FU 1 week. 8/23/21 - Alginate and add gentamicin ointment QD. HBOT referral.  Patient considering partial calcanectomy with flap. FU 1 week. 8/30/21 - Alginate and add gentamicin ointment QD. HBOT referral.  Patient considering partial calcanectomy with flap. FU 1 week. 9/20/21 - Alginate and add gentamicin ointment QD. HBOT referral.  Patient considering partial calcanectomy with flap. FU 1 week. 10/25/21 Alginate and add gentamicin ointment QD. HBOT referral.  Patient considering partial calcanectomy with flap. FU 1 week. 11/1/21 Alginate and add gentamicin ointment QD. HBOT referral.  Patient considering partial calcanectomy with flap. FU 1 week. 11/8/21 - Alginate and add gentamicin ointment QD. HBOT referral.  Patient considering partial calcanectomy with flap. FU 1 week. 11/15/21 - Alginate and add gentamicin ointment QD. HBOT referral.  Patient considering partial calcanectomy with flap. FU 1 week. 11/22/21 -  Alginate and add gentamicin ointment QD. HBOT referral.  Patient considering partial calcanectomy with flap. FU 1 week. 11/29/21 0 Alginate and gentamicin ointment. Possible DC home. HBOT referral.  Patient considering partial calcanectomy with flap. FU 1 week. 12/6/21 - Alginate and gentamicin ointment. Possible DC home. HBOT referral.  Patient considering partial calcanectomy with flap. FU 1 week. 12/13/21 - Alginate and gentamicin ointment. DC home. HBOT referral.  [facility] referral for wound care and PT for GAIT training and safe DME use. Patient considering partial calcanectomy with flap. FU 1 week. 12/20/21 -  Alginate and gentamicin ointment. DC home. HBOT referral.     12/27/21 - HBOT consultation complete - to start HBOT after new year. 2-14-22 patient presents today for evaluation of a left heel wound. Patient transferred here to [facility] due to the fact that he is doing HBO, for convenience due to transportation. He was prior following with Dr. Scott Hendricks. Patient overall doing well, no complaints. Tolerating dressings as well as hyperbarics. Physical exam demonstrates vascular intact. Wound appreciated posterior aspect with areas of devitalized nonviable tissue with beefy tissue noted as well. There is no purulence, odor, erythema or increase in temperature. Currently no exposed bone. 2-24-22  Patient overall doing well, no complaints. Tolerating dressings as well as hyperbarics. Physical exam demonstrates vascular intact. Wound appreciated posterior aspect with areas of devitalized nonviable tissue with beefy tissue noted as well. There is no purulence, odor, erythema or increase in temperature. 3-3-22 presents in follow-up. Relates dark area on his left great toenail as well as \"swelling\"  Left lower leg that he noticed recently. Patient denies any known trauma. Patient currently takes aspirin as well as Plavix. Physical exam demonstrates vascular intact.   Wound appreciated posterior aspect with areas of devitalized nonviable tissue with beefy tissue noted as well. There is no purulence, odor, erythema or increase in temperature. Left great toenail proximal lateral small area, subungual hematoma, stable. 2 fluctuant areas adjacent left lower leg anterior lateral aspect. There is no erythema or increase in temperature. After oral consent under sterile technique and utilizing 1% lidocaine plain, 21-gauge needle as well as 11 blade stab incision expressing approximately 20 cc of hematoma formation, this was cultured. Monitor closely if he notices any recurrence recommend Etta Marlow  for further evaluation    3-28-22 last seen March 3rd, transportation issues. Physical exam, vascular intact. Wound appreciated posterior aspect left heel with areas of devitalized nonviable tissue, increasing/seen. There is no purulence, odor, erythema or increase in temperature. Change treatment to Santyl. Patient would benefit from continued HBO.    4-4-22 Physical exam, vascular intact. Wound appreciated posterior aspect left heel with areas of devitalized nonviable tissue w/ areas of beefy tissue. There is no purulence, odor, erythema or increase in temperature. Santyl. HBO. Improved at present. 4-11-22 Wound appreciated posterior aspect left heel with areas of devitalized nonviable tissue w/ areas of beefy tissue. There is no purulence, odor, erythema or increase in temperature. Santyl. HBO. 4-18-22 Wound appreciated posterior aspect left heel with areas of devitalized nonviable tissue w/ areas of beefy tissue. There is no purulence, odor, erythema or increase in temperature. Santyl. HBO. Discussed possible graft in near future, will get updated vasc studies.     5/9/2022  Patient did not come last 2 weeks and also did not go for hyperbaric, last week, because of transportation problems  Patient tells me at one time his podiatrist Constance Garcia informed him that he may consider skin grafting of the wound  On today's examination, I can literally feel the bone, but looks fairly clean  Patient is trying to continue offloading the heel ulcer, and also continuing the hyperbaric oxygen therapy treatments  The last lower extremity artery Doppler study done in 2021 revealed almost triphasic right ankle Doppler tracings and biphasic left ankle Doppler tracings with adequate flow to the heel and foot for potential tissue healing  Patient was rescheduled for repeat proximal artery Doppler study by his podiatrist, not done yet I do not see it in the epic  Inform the patient that last week I discussed with Dr. Orlando Lara regarding continuing the hyperbaric oxygen therapy, possibility of skin grafting  Discussed with patient and nursing staff, please schedule appointment for the patient to see Dr. Orlando Lara for his input pending return to have his podiatrist back to the wound care center for further management   we will make additional recommendations once the lower extremity artery Doppler studies completed  All his questions were answered  5/18/22  Concern that arterial flow is not adequate based off exam today  Left dp weakly biphasic, PT monophasic  Will hold hbo for now - reevaluate post angio  Discussed angiogram - he had previous with Dr Tina Olivera which addressed his left sfa, pop with plasty using 6x250 DCB impact  Concern that tibial disease is the primary issue vs. Previous intervention has shut down  5/25/22  Angio 5/31  Wound stable heel  5/31/22  L SFA, pop atherectomy, plasty with 5x250  L PT plasty 3x80, 2x120  6/22/22   Wound larger but bone covered with granulation tissue  L DP strongly biphasic, PT biphasic, no right groin hematoma  aquacell ag  Culture  Consideration for graft in future  Consideration for hbo in future  6/29/22  Anciboacter bumani light growth - tx with bactrim  Wound dose appear better  7/13/22  Wound smaller  Appearance better  Pt having transportation issues - will transition him back to [facility]  Will also have reassessed for hbo at [facility]  7/25/22  Wound smaller  Appearance better  Cultures taken. X-rays right foot repeated    Wound/Ulcer Pain Timing/Severity: none  Quality of pain: N/A  Severity:  0 / 10   Modifying Factors: None  Associated Signs/Symptoms: none     Ulcer Identification:  Ulcer Type: arterial and diabetic  Contributing Factors: diabetes     Diabetic/Pressure/Non Pressure Ulcers onl y:        PAST MEDICAL HISTORY      Diagnosis Date    Arthritis     Bilateral carpal tunnel syndrome 2016    Cerebral artery occlusion with cerebral infarction [facility])     Chronic back pain     CVA (cerebral vascular accident) ([address] 11/2015, 2017    Diabetes mellitus ([address]     Type 2    Diabetic foot ulcer with osteomyelitis ([address] 12/21/2021    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 2 diabetes mellitus, with necrosis of bone ([address] 12/21/2021    Hemiparesis affecting left side as late effect of cerebrovascular accident ([address]     LEFT SIDE NON DOMINANT FOLLOWING STROKE    Hyperlipidemia     Hypertension     Peripheral vascular angioplasty status 12/21/2021    Ulcer of left heel, with necrosis of bone ([address] 12/27/2021    Ulcer of left heel, with necrosis of muscle ([address] 12/21/2021    Vitamin D deficiency      Past Surgical History:   Procedure Laterality Date    CARPAL TUNNEL RELEASE  10/01/2016    Dr. Severa Price Left 2/16/2021    LEFT FOOT DEBRIDEMENT WITH BONE BIOPSY, WOUND VAC APPLICATION performed by Julio Duque DPM at [address]    TRANSESOPHAGEAL ECHOCARDIOGRAM N/A 2/22/2021    TRANSESOPHAGEAL ECHOCARDIOGRAM WITH BUBBLE STUDY performed by Blessing Adams MD at [address] [place] [address] N/A 1/4/2021    EGD BIOPSY performed by Dimple Mcallister DO at [place] [id number] reviewed. No pertinent family history.   Social History     Tobacco Use    Smoking status: Former     Types: Cigarettes     Quit date: 2015     Years since quittin.5    Smokeless tobacco: Never   Vaping Use    Vaping Use: Never used   Substance Use Topics    Alcohol use: Not Currently     Comment: Former every day drinker for most of adult life    Drug use: No     Allergies   Allergen Reactions    Pcn [Penicillins] Anaphylaxis     Current Outpatient Medications on File Prior to Encounter   Medication Sig Dispense Refill    metFORMIN (GLUCOPHAGE) 500 MG tablet Take 1 tablet by mouth in the morning and 1 tablet in the evening. Take with meals. 60 tablet 3    atorvastatin (LIPITOR) 40 MG tablet Take 1 tablet by mouth nightly 30 tablet 3    gabapentin (NEURONTIN) 100 MG capsule Take 3 capsules by mouth 2 times daily for 30 days. 60 capsule 2    hydroCHLOROthiazide (HYDRODIURIL) 25 MG tablet Take 1 tablet by mouth daily 30 tablet 2    glimepiride (AMARYL) 2 MG tablet Take 1 tablet by mouth every morning (before breakfast) 30 tablet 2    traMADol (ULTRAM) 50 MG tablet Take 50 mg by mouth 2 times daily. diphenhydrAMINE (BENADRYL ALLERGY) 25 MG capsule Take 25 mg by mouth every 6 hours as needed for Itching      B Complex-C-E-Zn (STRESS B/ZINC) TABS Take 1 tablet by mouth daily      acetaminophen (TYLENOL) 325 MG tablet Take 650 mg by mouth every 6 hours as needed for Pain      ferrous sulfate (IRON 325) 325 (65 Fe) MG tablet Take 325 mg by mouth daily (with breakfast)      vitamin D (ERGOCALCIFEROL) 1.25 MG (96362 UT) CAPS capsule Take 1 capsule by mouth once a week 5 capsule 0    melatonin 3 MG TABS tablet Take 9 mg by mouth nightly as needed      aspirin 81 MG EC tablet Take 81 mg by mouth daily      clopidogrel (PLAVIX) 75 MG tablet Take 1 tablet by mouth daily 30 tablet 3     No current facility-administered medications on file prior to encounter.        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, with necrosis of bone (HCC) (Chronic)    Relevant Orders    Initiate Outpatient Wound Care Protocol    XR FOOT LEFT (MIN 3 VIEWS)    Diabetic foot infection ([address] - Primary    Relevant Orders    Initiate Outpatient Wound Care Protocol       Pre Debridement Measurements:  Are located in the [place]  Documentation Flow Sheet  Post Debridement Measurements:  Wound/Ulcer Descriptions are Pre Debridement except measurements:     Wound 12/26/20 Arm Left (Active)   Number of days: 575       Wound 02/15/21 Heel Left (Active)   Number of days: 525       Wound 02/15/21 Heel Right (Active)   Number of days: 525       Wound 03/08/21 Heel Left #1 left heel aquired 12/1/20 (Active)   Wound Image   06/22/22 1324   Wound Etiology Diabetic 02/14/22 1344   Dressing Status New dressing applied;Clean;Dry; Intact 07/13/22 1436   Wound Cleansed Cleansed with saline 07/13/22 1436   Dressing/Treatment Alginate with Ag;Dry dressing;ABD 07/13/22 1436   Offloading for Diabetic Foot Ulcers Post op shoe 07/13/22 1436   Wound Length (cm) 4.3 cm 07/25/22 1311   Wound Width (cm) 3.7 cm 07/25/22 1311   Wound Depth (cm) 0.9 cm 07/25/22 1311   Wound Surface Area (cm^2) 15.91 cm^2 07/25/22 1311   Change in Wound Size % (l*w) -31.6 07/25/22 1311   Wound Volume (cm^3) 14.319 cm^3 07/25/22 1311   Wound Healing % -69 07/25/22 1311   Post-Procedure Length (cm) 4.4 cm 07/25/22 1348   Post-Procedure Width (cm) 3.8 cm 07/25/22 1348   Post-Procedure Depth (cm) 1 cm 07/25/22 1348   Post-Procedure Surface Area (cm^2) 16.72 cm^2 07/25/22 1348   Post-Procedure Volume (cm^3) 16.72 cm^3 07/25/22 1348   Undermining Starts ___ O'Clock 9 05/25/22 1329   Undermining Ends___ O'Clock 3 05/25/22 1329   Undermining Maxium Distance (cm) Diane@[employer] 05/25/22 1329   Wound Assessment Fibrin;Pink/red 07/25/22 1311   Drainage Amount Large 07/25/22 1311   Drainage Description Yellow 07/25/22 1311   Odor None 07/25/22 1311   Adrianne-wound Assessment Dry/flaky 07/25/22 1311   Number of days: 504       Wound 03/08/21 Heel Right #2 rt heel aquired 12/1/20 (Active)   Wound Image   04/05/21 0929   Dressing Status New dressing applied 03/18/21 1352   Wound Cleansed Cleansed with saline 04/01/21 1536   Dressing/Treatment Alginate;Collagen;Silicone border 52/14/97 1536   Offloading for Diabetic Foot Ulcers Post op shoe 04/01/21 1536   Wound Length (cm) 0 cm 04/05/21 0929   Wound Width (cm) 0 cm 04/05/21 0929   Wound Depth (cm) 0 cm 04/05/21 0929   Wound Surface Area (cm^2) 0 cm^2 04/05/21 0929   Change in Wound Size % (l*w) 100 04/05/21 0929   Wound Volume (cm^3) 0 cm^3 04/05/21 0929   Wound Healing % 100 04/05/21 0929   Post-Procedure Length (cm) 0 cm 04/05/21 1011   Post-Procedure Width (cm) 0 cm 04/05/21 1011   Post-Procedure Depth (cm) 0 cm 04/05/21 1011   Post-Procedure Surface Area (cm^2) 0 cm^2 04/05/21 1011   Post-Procedure Volume (cm^3) 0 cm^3 04/05/21 1011   Wound Assessment Fibrin 04/01/21 1357   Drainage Amount None 04/01/21 1357   Drainage Description Yellow 03/22/21 0911   Odor None 04/01/21 1357   Adrianne-wound Assessment Maceration 04/01/21 1357   Number of days: 504       Wound 05/12/22 Pretibial Left #2 (Active)   Wound Image   07/13/22 1341   Wound Etiology Skin Tear 05/12/22 1306   Dressing Status New dressing applied 06/22/22 1410   Wound Cleansed Cleansed with saline 06/22/22 1410   Dressing/Treatment Alginate with Ag;ABD;Roll gauze 06/22/22 1410   Offloading for Diabetic Foot Ulcers Post op shoe 06/22/22 1410   Wound Length (cm) 0 cm 07/13/22 1341   Wound Width (cm) 0 cm 07/13/22 1341   Wound Orders     Assessment of pain at discharge: moderate     Anesthetic used: 4% liquid lidocaine solution     Discharge to:     Left via:ambulance     Accompanied by: self     [facility]: [facility]     Dressing Orders:  Cleanse left heel ulcer with normal saline solution, apply plain alginate Ag and dry dressing and change every day. Treatment Orders:   Wear prevalon boots or heel ayo while in bed. May be partial weight bearing up to 50% on left with surgical shoe. Culture taken in clinic today  Xray ordered. [facility] followup visit: __ 1 week Dr Cm Pham ____________  (Please note your next appointment above and if you are unable to keep, kindly give a 24 hour notice. Thank you.)     Physician signature:__________________________        If you experience any of the following, please call the [facility] during business hours:     * Increase in Pain  * Temperature over 101  * Increase in drainage from your wound  * Drainage with a foul odor  * Bleeding  * Increase in swelling  * Need for compression bandage changes due to slippage, breakthrough drainage. If you need medical attention outside of the business hours of the [facility] please contact your PCP or go to the nearest emergency room.            Electronically signed by Kristian Figueroa DPM on 7/25/2022 at 2:11 PM

## 2022-07-27 LAB — ANAEROBIC CULTURE: NORMAL

## 2022-07-28 LAB
GRAM STAIN RESULT: ABNORMAL
ORGANISM: ABNORMAL
WOUND/ABSCESS: ABNORMAL
WOUND/ABSCESS: ABNORMAL

## 2022-07-28 NOTE — DISCHARGE INSTRUCTIONS
Visit Discharge/Physician Orders     Assessment of pain at discharge: moderate     Anesthetic used: 4% liquid lidocaine solution     Discharge to:     Left via:ambulance     Accompanied by: self     ECF[facility]HHA: [facility]     Dressing Orders:  Cleanse left heel ulcer with normal saline solution, apply plain alginate Ag and dry dressing and change every day. Treatment Orders:   Wear prevalon boots or heel ayo while in bed. May be partial weight bearing up to 50% on left with surgical shoe. Culture reviewed in clinic today. Xray ordered     [address] [place] [address],[address] followup visit: __ 1 week Dr Kisha Wallace ____________  (Please note your next appointment above and if you are unable to keep, kindly give a 24 hour notice. Thank you.)     Physician signature:__________________________        If you experience any of the following, please call the [facility] during business hours:     * Increase in Pain  * Temperature over 101  * Increase in drainage from your wound  * Drainage with a foul odor  * Bleeding  * Increase in swelling  * Need for compression bandage changes due to slippage, breakthrough drainage. If you need medical attention outside of the business hours of the [facility] please contact your PCP or go to the nearest emergency room.

## 2022-08-01 ENCOUNTER — HOSPITAL ENCOUNTER (OUTPATIENT)
Dept: WOUND CARE | Age: 62
Discharge: HOME | End: 2022-08-01
Payer: COMMERCIAL

## 2022-08-01 VITALS
TEMPERATURE: 98.4 F | SYSTOLIC BLOOD PRESSURE: 120 MMHG | RESPIRATION RATE: 18 BRPM | DIASTOLIC BLOOD PRESSURE: 62 MMHG | HEART RATE: 72 BPM

## 2022-08-01 DIAGNOSIS — L08.9 DIABETIC FOOT INFECTION (HCC): Primary | ICD-10-CM

## 2022-08-01 DIAGNOSIS — L97.424 DIABETIC ULCER OF LEFT HEEL ASSOCIATED WITH TYPE 2 DIABETES MELLITUS, WITH NECROSIS OF BONE (HCC): ICD-10-CM

## 2022-08-01 DIAGNOSIS — E11.628 DIABETIC FOOT INFECTION (HCC): Primary | ICD-10-CM

## 2022-08-01 DIAGNOSIS — E11.621 DIABETIC ULCER OF LEFT HEEL ASSOCIATED WITH TYPE 2 DIABETES MELLITUS, WITH NECROSIS OF BONE (HCC): ICD-10-CM

## 2022-08-01 PROCEDURE — 6370000000 HC RX 637 (ALT 250 FOR IP): Performed by: SURGERY

## 2022-08-01 PROCEDURE — 11042 DBRDMT SUBQ TIS 1ST 20SQCM/<: CPT | Performed by: NURSE PRACTITIONER

## 2022-08-01 PROCEDURE — 11042 DBRDMT SUBQ TIS 1ST 20SQCM/<: CPT

## 2022-08-01 RX ORDER — CLOBETASOL PROPIONATE 0.5 MG/G
OINTMENT TOPICAL ONCE
Status: CANCELLED | OUTPATIENT
Start: 2022-08-01 | End: 2022-08-01

## 2022-08-01 RX ORDER — BACITRACIN ZINC AND POLYMYXIN B SULFATE 500; 1000 [USP'U]/G; [USP'U]/G
OINTMENT TOPICAL ONCE
Status: CANCELLED | OUTPATIENT
Start: 2022-08-01 | End: 2022-08-01

## 2022-08-01 RX ORDER — GENTAMICIN SULFATE 1 MG/G
OINTMENT TOPICAL ONCE
Status: CANCELLED | OUTPATIENT
Start: 2022-08-01 | End: 2022-08-01

## 2022-08-01 RX ORDER — LIDOCAINE 40 MG/G
CREAM TOPICAL ONCE
Status: CANCELLED | OUTPATIENT
Start: 2022-08-01 | End: 2022-08-01

## 2022-08-01 RX ORDER — GINSENG 100 MG
CAPSULE ORAL ONCE
Status: CANCELLED | OUTPATIENT
Start: 2022-08-01 | End: 2022-08-01

## 2022-08-01 RX ORDER — BETAMETHASONE DIPROPIONATE 0.05 %
OINTMENT (GRAM) TOPICAL ONCE
Status: CANCELLED | OUTPATIENT
Start: 2022-08-01 | End: 2022-08-01

## 2022-08-01 RX ORDER — BACITRACIN, NEOMYCIN, POLYMYXIN B 400; 3.5; 5 [USP'U]/G; MG/G; [USP'U]/G
OINTMENT TOPICAL ONCE
Status: CANCELLED | OUTPATIENT
Start: 2022-08-01 | End: 2022-08-01

## 2022-08-01 RX ORDER — LIDOCAINE 50 MG/G
OINTMENT TOPICAL ONCE
Status: CANCELLED | OUTPATIENT
Start: 2022-08-01 | End: 2022-08-01

## 2022-08-01 RX ORDER — LIDOCAINE HYDROCHLORIDE 40 MG/ML
SOLUTION TOPICAL ONCE
Status: COMPLETED | OUTPATIENT
Start: 2022-08-01 | End: 2022-08-01

## 2022-08-01 RX ORDER — LIDOCAINE HYDROCHLORIDE 20 MG/ML
JELLY TOPICAL ONCE
Status: CANCELLED | OUTPATIENT
Start: 2022-08-01 | End: 2022-08-01

## 2022-08-01 RX ORDER — LIDOCAINE HYDROCHLORIDE 40 MG/ML
SOLUTION TOPICAL ONCE
Status: CANCELLED | OUTPATIENT
Start: 2022-08-01 | End: 2022-08-01

## 2022-08-01 RX ADMIN — LIDOCAINE HYDROCHLORIDE: 40 SOLUTION TOPICAL at 13:36

## 2022-08-05 NOTE — PROGRESS NOTES
Wound Healing Center Followup Visit Note    Referring Physician : Deepak Kenney MD  [id number] [facility] RECORD NUMBER:  [id number]  AGE: 57 y.o. GENDER: male  : 1960  EPISODE DATE:  2022    Subjective:     Chief Complaint   Patient presents with    Wound Check     Left foot      HISTORY of PRESENT ILLNESS HPI   Mikie Jernigan is a 57 y.o. male who presents today in regards to follow up evaluation and treatment of wound/ulcer. That patient's past medical, family and social hx were reviewed and changes were made if present. History of Wound Context:  The patient has had a wound of bilateral heels which was first noted approximately over a month ago. This has been treated with surgical debridement. On their initial visit to the wound healing center, 21 ,  the patient has noted that the wound has not been improving. The patient has had similar previous wounds in the past.       Pt is not on abx at time of initial visit. 3/22/21 - Wound VAC MWF continue to left heel. Aquacel Ag to right heel. Debrided toenails 1-5 in thickness and length. FU 1 week. IV Abx per Dr. Tigist Huggins. 21 - Wound VAC MWF to left heel. Cultures obtained. FU 1 week after visit with Dr. Tigist Huggins. PO Abx.  21 - DC wound VAC to left heel. Alginate to wound. FU 1 week  21 - Alginate and gentamicin ointment QOD. Partial WB to heel at 50% to foot with surgical shoe and walker. 5/3/21 - Alginate and gentamicin ointment QOD. MRI left heel     5/10/21 - Alginate and gentamicin ointment QOD. MRI left heel pending. 21 - Alginate and gentamicin ointment QOD. MRI reviewed confirming OM. Discussed HBOT referral and partial calcanectomy. He opts for HBOT consultation. 21 - Alginate and add gentamicin ointment QD. HBOT referral.  Patient hold off on partial calcanectomy. FU 1 week     21 - Alginate and add gentamicin ointment QD.   HBOT referral.  Patient hold off on partial calcanectomy. FU 1 week     6/28/21 - Alginate and add gentamicin ointment QD. HBOT referral.  Patient hold off on partial calcanectomy. FU 1 week. Patient gave me consent (verbal) to speak with his brother Tisha Mcclellan regarding his at home arrangement. HBOT is on hold until patient is at home. 7/12/21 -  Alginate and add gentamicin ointment QD. HBOT referral.  Patient considering partial calcanectomy with flap. FU 1 week. 7/19/21 - Alginate and add gentamicin ointment QD. HBOT referral.  Patient considering partial calcanectomy with flap. FU 1 week. 8/2/21 - Alginate and add gentamicin ointment QD. HBOT referral.  Patient considering partial calcanectomy with flap. FU 1 week. 8/16/21 - Alginate and add gentamicin ointment QD. HBOT referral.  Patient considering partial calcanectomy with flap. FU 1 week. 8/23/21 - Alginate and add gentamicin ointment QD. HBOT referral.  Patient considering partial calcanectomy with flap. FU 1 week. 8/30/21 - Alginate and add gentamicin ointment QD. HBOT referral.  Patient considering partial calcanectomy with flap. FU 1 week. 9/20/21 - Alginate and add gentamicin ointment QD. HBOT referral.  Patient considering partial calcanectomy with flap. FU 1 week. 10/25/21 Alginate and add gentamicin ointment QD. HBOT referral.  Patient considering partial calcanectomy with flap. FU 1 week. 11/1/21 Alginate and add gentamicin ointment QD. HBOT referral.  Patient considering partial calcanectomy with flap. FU 1 week. 11/8/21 - Alginate and add gentamicin ointment QD. HBOT referral.  Patient considering partial calcanectomy with flap. FU 1 week. 11/15/21 - Alginate and add gentamicin ointment QD. HBOT referral.  Patient considering partial calcanectomy with flap. FU 1 week. 11/22/21 -  Alginate and add gentamicin ointment QD. HBOT referral.  Patient considering partial calcanectomy with flap. FU 1 week. 11/29/21 0 Alginate and gentamicin ointment. Possible DC home. HBOT referral.  Patient considering partial calcanectomy with flap. FU 1 week. 12/6/21 - Alginate and gentamicin ointment. Possible DC home. HBOT referral.  Patient considering partial calcanectomy with flap. FU 1 week. 12/13/21 - Alginate and gentamicin ointment. DC home. HBOT referral.  [facility]  referral for wound care and PT for GAIT training and safe DME use. Patient considering partial calcanectomy with flap. FU 1 week. 12/20/21 -  Alginate and gentamicin ointment. DC home. HBOT referral.     12/27/21 - HBOT consultation complete - to start HBOT after new year. 2-14-22 patient presents today for evaluation of a left heel wound. Patient transferred here to [facility] due to the fact that he is doing HBO, for convenience due to transportation. He was prior following with Dr. Eduard Chery. Patient overall doing well, no complaints. Tolerating dressings as well as hyperbarics. Physical exam demonstrates vascular intact. Wound appreciated posterior aspect with areas of devitalized nonviable tissue with beefy tissue noted as well. There is no purulence, odor, erythema or increase in temperature. Currently no exposed bone. 2-24-22  Patient overall doing well, no complaints. Tolerating dressings as well as hyperbarics. Physical exam demonstrates vascular intact. Wound appreciated posterior aspect with areas of devitalized nonviable tissue with beefy tissue noted as well. There is no purulence, odor, erythema or increase in temperature. 3-3-22 presents in follow-up. Relates dark area on his left great toenail as well as \"swelling\"  Left lower leg that he noticed recently. Patient denies any known trauma. Patient currently takes aspirin as well as Plavix. Physical exam demonstrates vascular intact.   Wound appreciated posterior aspect with areas of devitalized nonviable tissue with beefy tissue noted as well. There is no purulence, odor, erythema or increase in temperature. Left great toenail proximal lateral small area, subungual hematoma, stable. 2 fluctuant areas adjacent left lower leg anterior lateral aspect. There is no erythema or increase in temperature. After oral consent under sterile technique and utilizing 1% lidocaine plain, 21-gauge needle as well as 11 blade stab incision expressing approximately 20 cc of hematoma formation, this was cultured. Monitor closely if he notices any recurrence recommend [facility] for further evaluation    3-28-22 last seen March 3rd, transportation issues. Physical exam, vascular intact. Wound appreciated posterior aspect left heel with areas of devitalized nonviable tissue, increasing/seen. There is no purulence, odor, erythema or increase in temperature. Change treatment to Santyl. Patient would benefit from continued HBO.    4-4-22 Physical exam, vascular intact. Wound appreciated posterior aspect left heel with areas of devitalized nonviable tissue w/ areas of beefy tissue. There is no purulence, odor, erythema or increase in temperature. Santyl. HBO. Improved at present. 4-11-22 Wound appreciated posterior aspect left heel with areas of devitalized nonviable tissue w/ areas of beefy tissue. There is no purulence, odor, erythema or increase in temperature. Santyl. HBO. 4-18-22 Wound appreciated posterior aspect left heel with areas of devitalized nonviable tissue w/ areas of beefy tissue. There is no purulence, odor, erythema or increase in temperature. Santyl. HBO. Discussed possible graft in near future, will get updated vasc studies.     5/9/2022  Patient did not come last 2 weeks and also did not go for hyperbaric, last week, because of transportation problems  Patient tells me at one time his podiatrist Juan Colón informed him that he may consider skin grafting of the wound  On today's examination, I can literally feel the bone, but looks fairly clean  Patient is trying to continue offloading the heel ulcer, and also continuing the hyperbaric oxygen therapy treatments  The last lower extremity artery Doppler study done in 2021 revealed almost triphasic right ankle Doppler tracings and biphasic left ankle Doppler tracings with adequate flow to the heel and foot for potential tissue healing  Patient was rescheduled for repeat proximal artery Doppler study by his podiatrist, not done yet I do not see it in the epic  Inform the patient that last week I discussed with Dr. Ingrid Vargas regarding continuing the hyperbaric oxygen therapy, possibility of skin grafting  Discussed with patient and nursing staff, please schedule appointment for the patient to see Dr. nIgrid Vargas for his input pending return to have his podiatrist back to the wound care center for further management   we will make additional recommendations once the lower extremity artery Doppler studies completed  All his questions were answered  5/18/22  Concern that arterial flow is not adequate based off exam today  Left dp weakly biphasic, PT monophasic  Will hold hbo for now - reevaluate post angio  Discussed angiogram - he had previous with Dr Gianna Good which addressed his left sfa, pop with plasty using 6x250 DCB impact  Concern that tibial disease is the primary issue vs. Previous intervention has shut down  5/25/22  Angio 5/31  Wound stable heel  5/31/22  L SFA, pop atherectomy, plasty with 5x250  L PT plasty 3x80, 2x120  6/22/22   Wound larger but bone covered with granulation tissue  L DP strongly biphasic, PT biphasic, no right groin hematoma  aquacell ag  Culture  Consideration for graft in future  Consideration for hbo in future  6/29/22  Anciboacter bumani light growth - tx with bactrim  Wound dose appear better  7/13/22  Wound smaller  Appearance better  Pt having transportation issues - will transition him back to [facility] at [facility]  Will also have reassessed for hbo at [facility]  7/25/22  Wound smaller  Appearance better  Cultures taken. X-rays right foot repeated    8/1/22  Wound measuring slightly smaller   Continue Aquacel     Wound/Ulcer Pain Timing/Severity: none  Quality of pain: N/A  Severity:  0 / 10   Modifying Factors: None  Associated Signs/Symptoms: none     Ulcer Identification:  Ulcer Type: arterial and diabetic  Contributing Factors: diabetes     Diabetic/Pressure/Non Pressure Ulcers onl y:        PAST MEDICAL HISTORY      Diagnosis Date    Arthritis     Bilateral carpal tunnel syndrome 2016    Cerebral artery occlusion with cerebral infarction [facility])     Chronic back pain     CVA (cerebral vascular accident) ([address] 11/2015, 2017    Diabetes mellitus ([address]     Type 2    Diabetic foot ulcer with osteomyelitis ([address] 12/21/2021    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 2 diabetes mellitus, with necrosis of bone ([address] 12/21/2021    Hemiparesis affecting left side as late effect of cerebrovascular accident ([address]     LEFT SIDE NON DOMINANT FOLLOWING STROKE    Hyperlipidemia     Hypertension     Peripheral vascular angioplasty status 12/21/2021    Ulcer of left heel, with necrosis of bone ([address] 12/27/2021    Ulcer of left heel, with necrosis of muscle ([address] 12/21/2021    Vitamin D deficiency      Past Surgical History:   Procedure Laterality Date    CARPAL TUNNEL RELEASE  10/01/2016    Dr. Elizabeth Simmons Left 2/16/2021    LEFT FOOT DEBRIDEMENT WITH BONE BIOPSY, WOUND VAC APPLICATION performed by Mira Rudd DPM at [address]    TRANSESOPHAGEAL ECHOCARDIOGRAM N/A 2/22/2021    TRANSESOPHAGEAL ECHOCARDIOGRAM WITH BUBBLE STUDY performed by Yeimy Bond MD at [facility] N/A 1/4/2021    EGD BIOPSY performed by Anselmo Avila DO at [facility] reviewed. No pertinent family history.   Social History     Tobacco Use Smoking status: Former     Types: Cigarettes     Quit date: 2015     Years since quittin.5    Smokeless tobacco: Never   Vaping Use    Vaping Use: Never used   Substance Use Topics    Alcohol use: Not Currently     Comment: Former every day drinker for most of adult life    Drug use: No     Allergies   Allergen Reactions    Pcn [Penicillins] Anaphylaxis     Current Outpatient Medications on File Prior to Encounter   Medication Sig Dispense Refill    metFORMIN (GLUCOPHAGE) 500 MG tablet Take 1 tablet by mouth in the morning and 1 tablet in the evening. Take with meals. 60 tablet 3    atorvastatin (LIPITOR) 40 MG tablet Take 1 tablet by mouth nightly 30 tablet 3    gabapentin (NEURONTIN) 100 MG capsule Take 3 capsules by mouth 2 times daily for 30 days. 60 capsule 2    hydroCHLOROthiazide (HYDRODIURIL) 25 MG tablet Take 1 tablet by mouth daily 30 tablet 2    glimepiride (AMARYL) 2 MG tablet Take 1 tablet by mouth every morning (before breakfast) 30 tablet 2    traMADol (ULTRAM) 50 MG tablet Take 50 mg by mouth 2 times daily. diphenhydrAMINE (BENADRYL ALLERGY) 25 MG capsule Take 25 mg by mouth every 6 hours as needed for Itching      B Complex-C-E-Zn (STRESS B/ZINC) TABS Take 1 tablet by mouth daily      acetaminophen (TYLENOL) 325 MG tablet Take 650 mg by mouth every 6 hours as needed for Pain      ferrous sulfate (IRON 325) 325 (65 Fe) MG tablet Take 325 mg by mouth daily (with breakfast)      vitamin D (ERGOCALCIFEROL) 1.25 MG (37362 UT) CAPS capsule Take 1 capsule by mouth once a week 5 capsule 0    melatonin 3 MG TABS tablet Take 9 mg by mouth nightly as needed      aspirin 81 MG EC tablet Take 81 mg by mouth daily      clopidogrel (PLAVIX) 75 MG tablet Take 1 tablet by mouth daily 30 tablet 3     No current facility-administered medications on file prior to encounter.        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, with necrosis of bone (HCC) (Chronic)    Diabetic foot infection ([facility] - Primary       Pre Debridement Measurements:  Are located in the [facility]  Documentation Flow Sheet  Post Debridement Measurements:  Wound/Ulcer Descriptions are Pre Debridement except measurements:     Wound 12/26/20 Arm Left (Active)   Number of days: 586       Wound 02/15/21 Heel Left (Active)   Number of days: 536       Wound 02/15/21 Heel Right (Active)   Number of days: 536       Wound 03/08/21 Heel Left #1 left heel aquired 12/1/20 (Active)   Wound Image   06/22/22 1324   Wound Etiology Diabetic 02/14/22 1344   Dressing Status New dressing applied 08/01/22 1424   Wound Cleansed Cleansed with saline 08/01/22 1424   Dressing/Treatment Alginate with Ag;ABD 08/01/22 1424   Offloading for Diabetic Foot Ulcers Post op shoe 08/01/22 1424   Wound Length (cm) 4.5 cm 08/01/22 1332   Wound Width (cm) 3.6 cm 08/01/22 1332   Wound Depth (cm) 0.8 cm 08/01/22 1332   Wound Surface Area (cm^2) 16.2 cm^2 08/01/22 1332   Change in Wound Size % (l*w) -34 08/01/22 1332   Wound Volume (cm^3) 12.96 cm^3 08/01/22 1332   Wound Healing % -53 08/01/22 1332   Post-Procedure Length (cm) 4.6 cm 08/01/22 1424   Post-Procedure Width (cm) 3.7 cm 08/01/22 1424   Post-Procedure Depth (cm) 0.9 cm 08/01/22 1424   Post-Procedure Surface Area (cm^2) 17.02 cm^2 08/01/22 1424   Post-Procedure Volume (cm^3) 15.318 cm^3 08/01/22 1424   Undermining Starts ___ O'Clock 9 05/25/22 1329   Undermining Ends___ O'Clock 3 05/25/22 1329   Undermining Maxium Distance (cm) [email or web address] 05/25/22 1329   Wound Assessment Fibrin;Pink/red 08/01/22 1332   Drainage Amount Large 08/01/22 1332   Drainage Description Serosanguinous; Yellow 08/01/22 1332   Odor None 08/01/22 1332   Adrianne-wound Assessment Dry/flaky 08/01/22 1332   Number of days: 515       Wound 03/08/21 Heel Right #2 rt heel aquired 12/1/20 (Active)   Wound Image   04/05/21 0929   Dressing Status New dressing applied 03/18/21 1352   Wound Cleansed Cleansed with saline 04/01/21 1536   Dressing/Treatment Alginate;Collagen;Silicone border 80/22/86 1536   Offloading for Diabetic Foot Ulcers Post op shoe 04/01/21 1536   Wound Length (cm) 0 cm 04/05/21 0929   Wound Width (cm) 0 cm 04/05/21 0929   Wound Depth (cm) 0 cm 04/05/21 0929   Wound Surface Area (cm^2) 0 cm^2 04/05/21 0929   Change in Wound Size % (l*w) 100 04/05/21 0929   Wound Volume (cm^3) 0 cm^3 04/05/21 0929   Wound Healing % 100 04/05/21 0929   Post-Procedure Length (cm) 0 cm 04/05/21 1011   Post-Procedure Width (cm) 0 cm 04/05/21 1011   Post-Procedure Depth (cm) 0 cm 04/05/21 1011   Post-Procedure Surface Area (cm^2) 0 cm^2 04/05/21 1011   Post-Procedure Volume (cm^3) 0 cm^3 04/05/21 1011   Wound Assessment Fibrin 04/01/21 1357   Drainage Amount None 04/01/21 1357   Drainage Description Yellow 03/22/21 0911   Odor None 04/01/21 1357   Adrianne-wound Assessment Maceration 04/01/21 1357   Number of days: 515       Wound 05/12/22 Pretibial Left #2 (Active)   Wound Image   07/13/22 1341   Wound Etiology Skin Tear 05/12/22 1306   Dressing Status New dressing applied 06/22/22 1410   Wound Cleansed Cleansed with saline 06/22/22 1410   Dressing/Treatment Alginate with Ag;ABD;Roll gauze 06/22/22 1410   Offloading for Diabetic Foot Ulcers Post op shoe 06/22/22 1410   Wound Length (cm) 0 cm 07/13/22 1341   Wound Width (cm) 0 cm 07/13/22 1341   Wound Depth (cm) 0 cm 07/13/22 1341   Wound Surface Area (cm^2) 0 cm^2 07/13/22 1341   Change in Wound Size % (l*w) 100 07/13/22 1341   Wound Volume (cm^3) 0 cm^3 07/13/22 1341   Wound Healing % 100 07/13/22 1341   Post-Procedure Length (cm) 0 cm 07/13/22 1422   Post-Procedure Width (cm) 0 cm 07/13/22 1422   Post-Procedure Depth (cm) 0 cm 07/13/22 1422   Post-Procedure Surface Area (cm^2) 0 cm^2 07/13/22 1422   Post-Procedure Volume (cm^3) 0 cm^3 07/13/22 1422   Wound Assessment Dry 06/29/22 1319   Drainage Amount None 07/13/22 1341   Drainage Description Serous 06/29/22 1319   Odor None 06/22/22 1324   Adrianne-wound Assessment Intact 06/29/22 1319   Number of days: 85     Incision 02/19/21 Groin Right (Active)   Number of days: 532       Procedure Note  Indications:  Based on my examination of this patient's wound(s)/ulcer(s) today, debridement is required to promote healing and evaluate the wound base. Performed by: ALBERT De La Rosa CNP    Consent obtained:  Yes    Time out taken:  Yes    Pain Control: Anesthetic  Anesthetic: 4% Lidocaine Liquid Topical     Debridement:Excisional Debridement    Using curette the wound(s)/ulcer(s) was/were sharply debrided down through and including the removal of epidermis, dermis and subcutaneous tissue. Devitalized Tissue Debrided:  fibrin, biofilm, slough and exudate to stimulate bleeding to promote healing, post debridement good bleeding base and wound edges noted    Wound/Ulcer #: 1    Percent of Wound/Ulcer Debrided: 100%    Total Surface Area Debrided:  17.02 sq cm     Estimated Blood Loss:  Minimal  Hemostasis Achieved:  by pressure    Procedural Pain:  4  / 10   Post Procedural Pain:  2 / 10     Response to treatment:  Well tolerated by patient. Plan:   Treatment Note please see attached Discharge Instructions    Written patient dismissal instructions given to patient and signed by patient or POA.          Discharge Instructions         Visit Discharge/Physician Orders     Assessment of pain at discharge: moderate     Anesthetic used: 4% liquid lidocaine solution     Discharge to:     Left via:ambulance     Accompanied by: self     [facility]     Dressing Orders:  Cleanse left heel ulcer with normal saline solution, apply plain alginate Ag and dry dressing and change every day. Treatment Orders:   Wear prevalon boots or heel ayo while in bed. May be partial weight bearing up to 50% on left with surgical shoe. Culture reviewed in clinic today. Xray ordered     [facility] followup visit: __ 1 week Dr Dang Oneil ____________  (Please note your next appointment above and if you are unable to keep, kindly give a 24 hour notice. Thank you.)     Physician signature:__________________________        If you experience any of the following, please call the [facility] during business hours:     * Increase in Pain  * Temperature over 101  * Increase in drainage from your wound  * Drainage with a foul odor  * Bleeding  * Increase in swelling  * Need for compression bandage changes due to slippage, breakthrough drainage. If you need medical attention outside of the business hours of the [address] please contact your PCP or go to the nearest emergency room.       Electronically signed by ALBERT Christine CNP on 8/5/2022 at 5:01 PM

## 2022-08-08 ENCOUNTER — HOSPITAL ENCOUNTER (OUTPATIENT)
Age: 62
Discharge: HOME | End: 2022-08-10
Payer: COMMERCIAL

## 2022-08-08 ENCOUNTER — HOSPITAL ENCOUNTER (OUTPATIENT)
Dept: GENERAL RADIOLOGY | Age: 62
Discharge: HOME | End: 2022-08-10
Payer: COMMERCIAL

## 2022-08-08 ENCOUNTER — HOSPITAL ENCOUNTER (OUTPATIENT)
Dept: WOUND CARE | Age: 62
Discharge: HOME | End: 2022-08-08
Payer: COMMERCIAL

## 2022-08-08 VITALS
BODY MASS INDEX: 26.5 KG/M2 | TEMPERATURE: 97.7 F | HEART RATE: 60 BPM | SYSTOLIC BLOOD PRESSURE: 120 MMHG | DIASTOLIC BLOOD PRESSURE: 64 MMHG | WEIGHT: 190 LBS | RESPIRATION RATE: 18 BRPM

## 2022-08-08 DIAGNOSIS — E11.621 DIABETIC ULCER OF LEFT HEEL ASSOCIATED WITH TYPE 2 DIABETES MELLITUS, WITH NECROSIS OF BONE (HCC): ICD-10-CM

## 2022-08-08 DIAGNOSIS — L08.9 DIABETIC FOOT INFECTION (HCC): Primary | ICD-10-CM

## 2022-08-08 DIAGNOSIS — L97.424 DIABETIC ULCER OF LEFT HEEL ASSOCIATED WITH TYPE 2 DIABETES MELLITUS, WITH NECROSIS OF BONE (HCC): ICD-10-CM

## 2022-08-08 DIAGNOSIS — E11.628 DIABETIC FOOT INFECTION (HCC): Primary | ICD-10-CM

## 2022-08-08 PROCEDURE — 6370000000 HC RX 637 (ALT 250 FOR IP): Performed by: SURGERY

## 2022-08-08 PROCEDURE — 73630 X-RAY EXAM OF FOOT: CPT

## 2022-08-08 PROCEDURE — 11042 DBRDMT SUBQ TIS 1ST 20SQCM/<: CPT

## 2022-08-08 RX ORDER — LIDOCAINE 40 MG/G
CREAM TOPICAL ONCE
Status: CANCELLED | OUTPATIENT
Start: 2022-08-08 | End: 2022-08-08

## 2022-08-08 RX ORDER — GINSENG 100 MG
CAPSULE ORAL ONCE
Status: CANCELLED | OUTPATIENT
Start: 2022-08-08 | End: 2022-08-08

## 2022-08-08 RX ORDER — BACITRACIN ZINC AND POLYMYXIN B SULFATE 500; 1000 [USP'U]/G; [USP'U]/G
OINTMENT TOPICAL ONCE
Status: CANCELLED | OUTPATIENT
Start: 2022-08-08 | End: 2022-08-08

## 2022-08-08 RX ORDER — LIDOCAINE HYDROCHLORIDE 20 MG/ML
JELLY TOPICAL ONCE
Status: CANCELLED | OUTPATIENT
Start: 2022-08-08 | End: 2022-08-08

## 2022-08-08 RX ORDER — BACITRACIN, NEOMYCIN, POLYMYXIN B 400; 3.5; 5 [USP'U]/G; MG/G; [USP'U]/G
OINTMENT TOPICAL ONCE
Status: CANCELLED | OUTPATIENT
Start: 2022-08-08 | End: 2022-08-08

## 2022-08-08 RX ORDER — CLOBETASOL PROPIONATE 0.5 MG/G
OINTMENT TOPICAL ONCE
Status: CANCELLED | OUTPATIENT
Start: 2022-08-08 | End: 2022-08-08

## 2022-08-08 RX ORDER — LIDOCAINE HYDROCHLORIDE 40 MG/ML
SOLUTION TOPICAL ONCE
Status: COMPLETED | OUTPATIENT
Start: 2022-08-08 | End: 2022-08-08

## 2022-08-08 RX ORDER — LIDOCAINE HYDROCHLORIDE 40 MG/ML
SOLUTION TOPICAL ONCE
Status: CANCELLED | OUTPATIENT
Start: 2022-08-08 | End: 2022-08-08

## 2022-08-08 RX ORDER — LIDOCAINE 50 MG/G
OINTMENT TOPICAL ONCE
Status: CANCELLED | OUTPATIENT
Start: 2022-08-08 | End: 2022-08-08

## 2022-08-08 RX ORDER — BETAMETHASONE DIPROPIONATE 0.05 %
OINTMENT (GRAM) TOPICAL ONCE
Status: CANCELLED | OUTPATIENT
Start: 2022-08-08 | End: 2022-08-08

## 2022-08-08 RX ORDER — GENTAMICIN SULFATE 1 MG/G
OINTMENT TOPICAL ONCE
Status: CANCELLED | OUTPATIENT
Start: 2022-08-08 | End: 2022-08-08

## 2022-08-08 RX ADMIN — LIDOCAINE HYDROCHLORIDE 10 ML: 40 SOLUTION TOPICAL at 13:05

## 2022-08-08 ASSESSMENT — PAIN - FUNCTIONAL ASSESSMENT: PAIN_FUNCTIONAL_ASSESSMENT: ACTIVITIES ARE NOT PREVENTED

## 2022-08-08 ASSESSMENT — PAIN DESCRIPTION - ORIENTATION: ORIENTATION: LEFT

## 2022-08-08 ASSESSMENT — PAIN DESCRIPTION - DESCRIPTORS: DESCRIPTORS: ACHING

## 2022-08-08 ASSESSMENT — PAIN DESCRIPTION - LOCATION: LOCATION: FOOT

## 2022-08-08 ASSESSMENT — PAIN SCALES - GENERAL: PAINLEVEL_OUTOF10: 3

## 2022-08-08 ASSESSMENT — PAIN DESCRIPTION - ONSET: ONSET: ON-GOING

## 2022-08-08 ASSESSMENT — PAIN DESCRIPTION - FREQUENCY: FREQUENCY: INTERMITTENT

## 2022-08-08 NOTE — PROGRESS NOTES
Wound Healing Center Followup Visit Note    Referring Physician : Jason Mustafa MD  [id number] [facility] RECORD NUMBER:  [id number]  AGE: 57 y.o. GENDER: male  : 1960  EPISODE DATE:  2022    Subjective:     Chief Complaint   Patient presents with    Wound Check     Left foot      HISTORY of PRESENT ILLNESS HPI   Germaine Crowley is a 57 y.o. male who presents today in regards to follow up evaluation and treatment of wound/ulcer. That patient's past medical, family and social hx were reviewed and changes were made if present. History of Wound Context:  The patient has had a wound of bilateral heels which was first noted approximately over a month ago. This has been treated with surgical debridement. On their initial visit to the wound healing center, 21 ,  the patient has noted that the wound has not been improving. The patient has had similar previous wounds in the past.       Pt is not on abx at time of initial visit. 3/22/21 - Wound VAC MWF continue to left heel. Aquacel Ag to right heel. Debrided toenails 1-5 in thickness and length. FU 1 week. IV Abx per Dr. Rosi Fields. 21 - Wound VAC MWF to left heel. Cultures obtained. FU 1 week after visit with Dr. Rosi Fields. PO Abx.  21 - DC wound VAC to left heel. Alginate to wound. FU 1 week  21 - Alginate and gentamicin ointment QOD. Partial WB to heel at 50% to foot with surgical shoe and walker. 5/3/21 - Alginate and gentamicin ointment QOD. MRI left heel     5/10/21 - Alginate and gentamicin ointment QOD. MRI left heel pending. 21 - Alginate and gentamicin ointment QOD. MRI reviewed confirming OM. Discussed HBOT referral and partial calcanectomy. He opts for HBOT consultation. 21 - Alginate and add gentamicin ointment QD. HBOT referral.  Patient hold off on partial calcanectomy. FU 1 week     21 - Alginate and add gentamicin ointment QD.   HBOT referral.  Patient hold off on partial calcanectomy. FU 1 week     6/28/21 - Alginate and add gentamicin ointment QD. HBOT referral.  Patient hold off on partial calcanectomy. FU 1 week. Patient gave me consent (verbal) to speak with his brother Dakotah Goyal regarding his at home arrangement. HBOT is on hold until patient is at home. 7/12/21 -  Alginate and add gentamicin ointment QD. HBOT referral.  Patient considering partial calcanectomy with flap. FU 1 week. 7/19/21 - Alginate and add gentamicin ointment QD. HBOT referral.  Patient considering partial calcanectomy with flap. FU 1 week. 8/2/21 - Alginate and add gentamicin ointment QD. HBOT referral.  Patient considering partial calcanectomy with flap. FU 1 week. 8/16/21 - Alginate and add gentamicin ointment QD. HBOT referral.  Patient considering partial calcanectomy with flap. FU 1 week. 8/23/21 - Alginate and add gentamicin ointment QD. HBOT referral.  Patient considering partial calcanectomy with flap. FU 1 week. 8/30/21 - Alginate and add gentamicin ointment QD. HBOT referral.  Patient considering partial calcanectomy with flap. FU 1 week. 9/20/21 - Alginate and add gentamicin ointment QD. HBOT referral.  Patient considering partial calcanectomy with flap. FU 1 week. 10/25/21 Alginate and add gentamicin ointment QD. HBOT referral.  Patient considering partial calcanectomy with flap. FU 1 week. 11/1/21 Alginate and add gentamicin ointment QD. HBOT referral.  Patient considering partial calcanectomy with flap. FU 1 week. 11/8/21 - Alginate and add gentamicin ointment QD. HBOT referral.  Patient considering partial calcanectomy with flap. FU 1 week. 11/15/21 - Alginate and add gentamicin ointment QD. HBOT referral.  Patient considering partial calcanectomy with flap. FU 1 week. 11/22/21 -  Alginate and add gentamicin ointment QD. HBOT referral.  Patient considering partial calcanectomy with flap. FU 1 week. 11/29/21 0 Alginate and gentamicin ointment. Possible DC home. HBOT referral.  Patient considering partial calcanectomy with flap. FU 1 week. 12/6/21 - Alginate and gentamicin ointment. Possible DC home. HBOT referral.  Patient considering partial calcanectomy with flap. FU 1 week. 12/13/21 - Alginate and gentamicin ointment. DC home. HBOT referral.  [facility]  referral for wound care and PT for GAIT training and safe DME use. Patient considering partial calcanectomy with flap. FU 1 week. 12/20/21 -  Alginate and gentamicin ointment. DC home. HBOT referral.     12/27/21 - HBOT consultation complete - to start HBOT after new year. 2-14-22 patient presents today for evaluation of a left heel wound. Patient transferred here to [facility] wound care center [place] due to the fact that he is doing HBO, for convenience due to transportation. He was prior following with Dr. Danyel Kemp. Patient overall doing well, no complaints. Tolerating dressings as well as hyperbarics. Physical exam demonstrates vascular intact. Wound appreciated posterior aspect with areas of devitalized nonviable tissue with beefy tissue noted as well. There is no purulence, odor, erythema or increase in temperature. Currently no exposed bone. 2-24-22  Patient overall doing well, no complaints. Tolerating dressings as well as hyperbarics. Physical exam demonstrates vascular intact. Wound appreciated posterior aspect with areas of devitalized nonviable tissue with beefy tissue noted as well. There is no purulence, odor, erythema or increase in temperature. 3-3-22 presents in follow-up. Relates dark area on his left great toenail as well as \"swelling\"  Left lower leg that he noticed recently. Patient denies any known trauma. Patient currently takes aspirin as well as Plavix. Physical exam demonstrates vascular intact.   Wound appreciated posterior aspect with areas of devitalized nonviable tissue with beefy tissue noted as well. There is no purulence, odor, erythema or increase in temperature. Left great toenail proximal lateral small area, subungual hematoma, stable. 2 fluctuant areas adjacent left lower leg anterior lateral aspect. There is no erythema or increase in temperature. After oral consent under sterile technique and utilizing 1% lidocaine plain, 21-gauge needle as well as 11 blade stab incision expressing approximately 20 cc of hematoma formation, this was cultured. Monitor closely if he notices any recurrence recommend [facility] for further evaluation    3-28-22 last seen March 3rd, transportation issues. Physical exam, vascular intact. Wound appreciated posterior aspect left heel with areas of devitalized nonviable tissue, increasing/seen. There is no purulence, odor, erythema or increase in temperature. Change treatment to Santyl. Patient would benefit from continued HBO.    4-4-22 Physical exam, vascular intact. Wound appreciated posterior aspect left heel with areas of devitalized nonviable tissue w/ areas of beefy tissue. There is no purulence, odor, erythema or increase in temperature. Santyl. HBO. Improved at present. 4-11-22 Wound appreciated posterior aspect left heel with areas of devitalized nonviable tissue w/ areas of beefy tissue. There is no purulence, odor, erythema or increase in temperature. Santyl. HBO. 4-18-22 Wound appreciated posterior aspect left heel with areas of devitalized nonviable tissue w/ areas of beefy tissue. There is no purulence, odor, erythema or increase in temperature. Santyl. HBO. Discussed possible graft in near future, will get updated vasc studies.     5/9/2022  Patient did not come last 2 weeks and also did not go for hyperbaric, last week, because of transportation problems  Patient tells me at one time his podiatrist Anita Welch informed him that he may consider skin grafting of the wound  On today's examination, I can literally feel the bone, but looks fairly clean  Patient is trying to continue offloading the heel ulcer, and also continuing the hyperbaric oxygen therapy treatments  The last lower extremity artery Doppler study done in 2021 revealed almost triphasic right ankle Doppler tracings and biphasic left ankle Doppler tracings with adequate flow to the heel and foot for potential tissue healing  Patient was rescheduled for repeat proximal artery Doppler study by his podiatrist, not done yet I do not see it in the epic  Inform the patient that last week I discussed with Dr. Dianna Curry regarding continuing the hyperbaric oxygen therapy, possibility of skin grafting  Discussed with patient and nursing staff, please schedule appointment for the patient to see Dr. Dianna Curry for his input pending return to have his podiatrist back to the wound care center for further management   we will make additional recommendations once the lower extremity artery Doppler studies completed  All his questions were answered  5/18/22  Concern that arterial flow is not adequate based off exam today  Left dp weakly biphasic, PT monophasic  Will hold hbo for now - reevaluate post angio  Discussed angiogram - he had previous with Dr Alice Vela which addressed his left sfa, pop with plasty using 6x250 DCB impact  Concern that tibial disease is the primary issue vs. Previous intervention has shut down  5/25/22  Angio 5/31  Wound stable heel  5/31/22  L SFA, pop atherectomy, plasty with 5x250  L PT plasty 3x80, 2x120  6/22/22   Wound larger but bone covered with granulation tissue  L DP strongly biphasic, PT biphasic, no right groin hematoma  aquacell ag  Culture  Consideration for graft in future  Consideration for hbo in future  6/29/22  Anciboacter bumani light growth - tx with bactrim  Wound dose appear better  7/13/22  Wound smaller  Appearance better  Pt having transportation issues - will transition him back to Miquel Temple at [facility]  Will also have reassessed for hbo at [facility]  7/25/22  Wound smaller  Appearance better  Cultures taken. X-rays right foot repeated    8/8/22 - X-rays pending. Continue current regimen for now. FU 1 week. HBOT continue    Wound/Ulcer Pain Timing/Severity: none  Quality of pain: N/A  Severity:  0 / 10   Modifying Factors: None  Associated Signs/Symptoms: none     Ulcer Identification:  Ulcer Type: arterial and diabetic  Contributing Factors: diabetes     Diabetic/Pressure/Non Pressure Ulcers onl y:        PAST MEDICAL HISTORY      Diagnosis Date    Arthritis     Bilateral carpal tunnel syndrome 2016    Cerebral artery occlusion with cerebral infarction [facility])     Chronic back pain     CVA (cerebral vascular accident) ([address] 11/2015, 2017    Diabetes mellitus ([address]     Type 2    Diabetic foot ulcer with osteomyelitis ([address] 12/21/2021    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 2 diabetes mellitus, with necrosis of bone ([address] 12/21/2021    Hemiparesis affecting left side as late effect of cerebrovascular accident ([address]     LEFT SIDE NON DOMINANT FOLLOWING STROKE    Hyperlipidemia     Hypertension     Peripheral vascular angioplasty status 12/21/2021    Ulcer of left heel, with necrosis of bone ([address] 12/27/2021    Ulcer of left heel, with necrosis of muscle ([address] 12/21/2021    Vitamin D deficiency      Past Surgical History:   Procedure Laterality Date    CARPAL TUNNEL RELEASE  10/01/2016    Dr. Meagan Mendoza Left 2/16/2021    LEFT FOOT DEBRIDEMENT WITH BONE BIOPSY, WOUND VAC APPLICATION performed by Bee Nina DPM at [address]    TRANSESOPHAGEAL ECHOCARDIOGRAM N/A 2/22/2021    TRANSESOPHAGEAL ECHOCARDIOGRAM WITH BUBBLE STUDY performed by Chet Hudson MD at [facility] 13 N/A 1/4/2021    EGD BIOPSY performed by Holger Pelaez DO at [facility] 23 reviewed.  No pertinent family (Tympanic)   Resp 18   Wt 190 lb (86.2 kg)   BMI 26.50 kg/m²   Wt Readings from Last 3 Encounters:   08/08/22 190 lb (86.2 kg)   07/13/22 190 lb (86.2 kg)   11/06/20 197 lb (89.4 kg)     PHYSICAL EXAM  CONSTITUTIONAL:   Awake, alert, cooperative   EYES:  lids and lashes normal   ENT: external ears and nose without lesions   NECK:  supple, symmetrical, trachea midline   SKIN:  Open wound     Assessment:     Problem List Items Addressed This Visit       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 2 diabetes mellitus, with necrosis of bone ([facility] (Chronic)    Relevant Orders    Initiate Outpatient Wound Care Protocol    Diabetic foot infection ([facility] - Primary    Relevant Orders    Initiate Outpatient Wound Care Protocol       Pre Debridement Measurements:  Are located in the [place]  Documentation Flow Sheet  Post Debridement Measurements:  Wound/Ulcer Descriptions are Pre Debridement except measurements:     Wound 12/26/20 Arm Left (Active)   Number of days: 589       Wound 02/15/21 Heel Left (Active)   Number of days: 539       Wound 02/15/21 Heel Right (Active)   Number of days: 539       Wound 03/08/21 Heel Left #1 left heel aquired 12/1/20 (Active)   Wound Image   08/08/22 1300   Wound Etiology Diabetic 02/14/22 1344   Dressing Status New dressing applied 08/08/22 1349   Wound Cleansed Cleansed with saline 08/08/22 1349   Dressing/Treatment Alginate with Ag;ABD 08/08/22 1349   Offloading for Diabetic Foot Ulcers Post op shoe 08/08/22 1349   Wound Length (cm) 4.3 cm 08/08/22 1300   Wound Width (cm) 2.8 cm 08/08/22 1300   Wound Depth (cm) 0.5 cm 08/08/22 1300   Wound Surface Area (cm^2) 12.04 cm^2 08/08/22 1300   Change in Wound Size % (l*w) 0.41 08/08/22 1300   Wound Volume (cm^3) 6.02 cm^3 08/08/22 1300   Wound Healing % 29 08/08/22 1300   Post-Procedure Length (cm) 4.4 cm 08/08/22 1330   Post-Procedure Width (cm) 2.9 cm 08/08/22 1330   Post-Procedure Depth (cm) 0.6 cm 08/08/22 1330   Post-Procedure Surface Area (cm^2) 12.76 cm^2 08/08/22 1330   Post-Procedure Volume (cm^3) 7. 656 cm^3 08/08/22 1330   Undermining Starts ___ O'Clock 9 05/25/22 1329   Undermining Ends___ O'Clock 3 05/25/22 1329   Undermining Maxium Distance (cm) [email or web address] 05/25/22 1329   Wound Assessment Fibrin;Pink/red 08/08/22 1300   Drainage Amount Large 08/08/22 1300   Drainage Description Yellow;Serosanguinous 08/08/22 1300   Odor None 08/08/22 1300   Adrianne-wound Assessment Maceration 08/08/22 1300   Number of days: 518       Wound 03/08/21 Heel Right #2 rt heel aquired 12/1/20 (Active)   Wound Image   04/05/21 0929   Dressing Status New dressing applied 03/18/21 1352   Wound Cleansed Cleansed with saline 04/01/21 1536   Dressing/Treatment Alginate;Collagen;Silicone border 07/91/88 1536   Offloading for Diabetic Foot Ulcers Post op shoe 04/01/21 1536   Wound Length (cm) 0 cm 04/05/21 0929   Wound Width (cm) 0 cm 04/05/21 0929   Wound Depth (cm) 0 cm 04/05/21 0929   Wound Surface Area (cm^2) 0 cm^2 04/05/21 0929   Change in Wound Size % (l*w) 100 04/05/21 0929   Wound Volume (cm^3) 0 cm^3 04/05/21 0929   Wound Healing % 100 04/05/21 0929   Post-Procedure Length (cm) 0 cm 04/05/21 1011   Post-Procedure Width (cm) 0 cm 04/05/21 1011   Post-Procedure Depth (cm) 0 cm 04/05/21 1011   Post-Procedure Surface Area (cm^2) 0 cm^2 04/05/21 1011   Post-Procedure Volume (cm^3) 0 cm^3 04/05/21 1011   Wound Assessment Fibrin 04/01/21 1357   Drainage Amount None 04/01/21 1357   Drainage Description Yellow 03/22/21 0911   Odor None 04/01/21 1357   Adrianne-wound Assessment Maceration 04/01/21 1357   Number of days: 518     Incision 02/19/21 Groin Right (Active)   Number of days: 535       Procedure Note  Indications:  Based on my examination of this patient's wound(s)/ulcer(s) today, debridement is required to promote healing and evaluate the wound base.     Performed by: Kwame Arriola DPM    Consent obtained:  Yes    Time out taken:  Yes    Pain Control: Anesthetic  Anesthetic: 4% Lidocaine Liquid Topical     Debridement:Excisional Debridement    Using curette the wound(s)/ulcer(s) was/were sharply debrided down through and including the removal of epidermis, dermis and subcutaneous tissue. Devitalized Tissue Debrided:  fibrin, biofilm, slough and exudate to stimulate bleeding to promote healing, post debridement good bleeding base and wound edges noted    Wound/Ulcer #: 1    Percent of Wound/Ulcer Debrided: 100%    Total Surface Area Debrided:  12.76 sq cm     Estimated Blood Loss:  Minimal  Hemostasis Achieved:  by pressure    Procedural Pain:  4  / 10   Post Procedural Pain:  2 / 10     Response to treatment:  Well tolerated by patient. Plan:   Treatment Note please see attached Discharge Instructions    Written patient dismissal instructions given to patient and signed by patient or POA. Discharge Instructions         Visit Discharge/Physician Orders     Assessment of pain at discharge: moderate     Anesthetic used: 4% liquid lidocaine solution     Discharge to:     Left via:ambulance     Accompanied by: self     ECF/HHA: [address]     Dressing Orders:  Cleanse left heel ulcer with normal saline solution, apply plain alginate Ag and dry dressing and change every day. Treatment Orders:   Wear prevalon boots or heel ayo while in bed. May be partial weight bearing up to 50% on left with surgical shoe. Xray ordered. [facility] followup visit: __ 1 week Dr Alberto Ding ____________  (Please note your next appointment above and if you are unable to keep, kindly give a 24 hour notice.  Thank you.)     Physician signature:__________________________        If you experience any of the following, please call the [address] [place] [address] Road during business hours:     * Increase in Pain  * Temperature over 101  * Increase in drainage from your wound  * Drainage with a foul odor  * Bleeding  * Increase in swelling  * Need for compression bandage changes due to slippage, breakthrough drainage. If you need medical attention outside of the business hours of the [address] [place] [address] Road please contact your PCP or go to the nearest emergency room.         Electronically signed by Baltazar Francis DPM on 8/8/2022 at 2:01 PM

## 2022-08-08 NOTE — PLAN OF CARE
Problem: Chronic Conditions and Co-morbidities  Goal: Patient's chronic conditions and co-morbidity symptoms are monitored and maintained or improved  Outcome: Progressing     Problem: Pain  Goal: Verbalizes/displays adequate comfort level or baseline comfort level  Outcome: Progressing     Problem: Pain  Goal: Verbalizes/displays adequate comfort level or baseline comfort level  Outcome: Progressing     Problem: Wound:  Goal: Will show signs of wound healing; wound closure and no evidence of infection  Description: Will show signs of wound healing; wound closure and no evidence of infection  Outcome: Progressing     Problem: Blood Glucose:  Goal: Ability to maintain appropriate glucose levels will improve  Description: Ability to maintain appropriate glucose levels will improve  Outcome: Progressing

## 2022-08-09 ENCOUNTER — OFFICE VISIT (OUTPATIENT)
Dept: PRIMARY CARE CLINIC | Age: 62
End: 2022-08-09
Payer: COMMERCIAL

## 2022-08-09 VITALS
WEIGHT: 192 LBS | OXYGEN SATURATION: 96 % | HEART RATE: 82 BPM | SYSTOLIC BLOOD PRESSURE: 133 MMHG | DIASTOLIC BLOOD PRESSURE: 74 MMHG | TEMPERATURE: 98.2 F | HEIGHT: 71 IN | BODY MASS INDEX: 26.88 KG/M2

## 2022-08-09 DIAGNOSIS — M54.40 LOW BACK PAIN WITH SCIATICA, SCIATICA LATERALITY UNSPECIFIED, UNSPECIFIED BACK PAIN LATERALITY, UNSPECIFIED CHRONICITY: Primary | ICD-10-CM

## 2022-08-09 DIAGNOSIS — L97.424 DIABETIC ULCER OF LEFT HEEL ASSOCIATED WITH TYPE 2 DIABETES MELLITUS, WITH NECROSIS OF BONE (HCC): ICD-10-CM

## 2022-08-09 DIAGNOSIS — E11.621 DIABETIC ULCER OF LEFT HEEL ASSOCIATED WITH TYPE 2 DIABETES MELLITUS, WITH NECROSIS OF BONE (HCC): ICD-10-CM

## 2022-08-09 PROCEDURE — 99214 OFFICE O/P EST MOD 30 MIN: CPT | Performed by: FAMILY MEDICINE

## 2022-08-09 RX ORDER — TRAMADOL HYDROCHLORIDE 50 MG/1
50 TABLET ORAL 2 TIMES DAILY
Qty: 60 TABLET | Refills: 2 | Status: SHIPPED | OUTPATIENT
Start: 2022-08-09 | End: 2022-09-08

## 2022-08-09 SDOH — ECONOMIC STABILITY: FOOD INSECURITY: WITHIN THE PAST 12 MONTHS, THE FOOD YOU BOUGHT JUST DIDN'T LAST AND YOU DIDN'T HAVE MONEY TO GET MORE.: NEVER TRUE

## 2022-08-09 SDOH — ECONOMIC STABILITY: FOOD INSECURITY: WITHIN THE PAST 12 MONTHS, YOU WORRIED THAT YOUR FOOD WOULD RUN OUT BEFORE YOU GOT MONEY TO BUY MORE.: NEVER TRUE

## 2022-08-09 ASSESSMENT — PATIENT HEALTH QUESTIONNAIRE - PHQ9
SUM OF ALL RESPONSES TO PHQ QUESTIONS 1-9: 2
SUM OF ALL RESPONSES TO PHQ QUESTIONS 1-9: 2
SUM OF ALL RESPONSES TO PHQ9 QUESTIONS 1 & 2: 2
1. LITTLE INTEREST OR PLEASURE IN DOING THINGS: 1
2. FEELING DOWN, DEPRESSED OR HOPELESS: 1
SUM OF ALL RESPONSES TO PHQ QUESTIONS 1-9: 2
SUM OF ALL RESPONSES TO PHQ QUESTIONS 1-9: 2

## 2022-08-09 ASSESSMENT — ENCOUNTER SYMPTOMS: ROS SKIN COMMENTS: LEFT FOOT

## 2022-08-09 ASSESSMENT — SOCIAL DETERMINANTS OF HEALTH (SDOH): HOW HARD IS IT FOR YOU TO PAY FOR THE VERY BASICS LIKE FOOD, HOUSING, MEDICAL CARE, AND HEATING?: NOT HARD AT ALL

## 2022-08-09 NOTE — PROGRESS NOTES
Ema Primrose (:  1960) is a 57 y.o. male,Established patient, here for evaluation of the following chief complaint(s):  Back Pain and Foot Pain         ASSESSMENT/PLAN:  1. Low back pain with sciatica, sciatica laterality unspecified, unspecified back pain laterality, unspecified chronicity  -     traMADol (ULTRAM) 50 MG tablet; Take 1 tablet by mouth in the morning and 1 tablet in the evening. Do all this for 30 days. , Disp-60 tablet, R-2Print  2. 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 2 diabetes mellitus, with necrosis of bone ([address]      Return in about 3 months (around 2022). Subjective   SUBJECTIVE/OBJECTIVE:  HPI  Here f/u back pain     wound care being worked on by wound care     /74   Pulse 82   Temp 98.2 °F (36.8 °C)   Ht 5' 11\" (1.803 m)   Wt 192 lb (87.1 kg)   SpO2 96%   BMI 26.78 kg/m²     Review of Systems   Endocrine: Positive for polydipsia, polyphagia and polyuria. Skin:  Positive for wound. Left foot         Objective   Physical Exam  Constitutional:       Appearance: Normal appearance. He is normal weight. Cardiovascular:      Rate and Rhythm: Normal rate and regular rhythm. Heart sounds: Normal heart sounds. No murmur heard. Pulmonary:      Effort: Pulmonary effort is normal.      Breath sounds: Normal breath sounds. Abdominal:      Tenderness: There is no abdominal tenderness. Musculoskeletal:         General: Signs of injury present. Comments: Has ulcer left foot being tended by wound care   Neurological:      Mental Status: He is alert. Psychiatric:         Mood and Affect: Mood normal.                    An electronic signature was used to authenticate this note.     --Andrew Martin MD

## 2022-08-09 NOTE — DISCHARGE INSTRUCTIONS
Visit Discharge/Physician Orders     Assessment of pain at discharge: moderate     Anesthetic used: 4% liquid lidocaine solution     Discharge to:     Left via:ambulance     Accompanied by: self     [facility]     Dressing Orders:  Cleanse left heel ulcer with normal saline solution, apply plain alginate Ag and dry dressing and change every day. Treatment Orders:   Wear prevalon boots or heel ayo while in bed. May be partial weight bearing up to 50% on left with surgical shoe. MRI to be scheduled for left foot. [facility] followup visit: __ 1 week Dr Elton Crowe ____________  (Please note your next appointment above and if you are unable to keep, kindly give a 24 hour notice. Thank you.)     Physician signature:__________________________        If you experience any of the following, please call the [facility] during business hours:     * Increase in Pain  * Temperature over 101  * Increase in drainage from your wound  * Drainage with a foul odor  * Bleeding  * Increase in swelling  * Need for compression bandage changes due to slippage, breakthrough drainage. If you need medical attention outside of the business hours of the [employer] [address] please contact your PCP or go to the nearest emergency room.

## 2022-08-15 ENCOUNTER — HOSPITAL ENCOUNTER (OUTPATIENT)
Dept: WOUND CARE | Age: 62
Discharge: HOME | End: 2022-08-15
Payer: COMMERCIAL

## 2022-08-15 VITALS
TEMPERATURE: 98 F | DIASTOLIC BLOOD PRESSURE: 76 MMHG | SYSTOLIC BLOOD PRESSURE: 144 MMHG | RESPIRATION RATE: 18 BRPM | BODY MASS INDEX: 26.88 KG/M2 | WEIGHT: 192 LBS | HEART RATE: 94 BPM | HEIGHT: 71 IN

## 2022-08-15 DIAGNOSIS — L08.9 DIABETIC FOOT INFECTION (HCC): Primary | ICD-10-CM

## 2022-08-15 DIAGNOSIS — E11.628 DIABETIC FOOT INFECTION (HCC): Primary | ICD-10-CM

## 2022-08-15 DIAGNOSIS — E11.621 DIABETIC ULCER OF LEFT HEEL ASSOCIATED WITH TYPE 2 DIABETES MELLITUS, WITH NECROSIS OF BONE (HCC): ICD-10-CM

## 2022-08-15 DIAGNOSIS — L97.424 DIABETIC ULCER OF LEFT HEEL ASSOCIATED WITH TYPE 2 DIABETES MELLITUS, WITH NECROSIS OF BONE (HCC): ICD-10-CM

## 2022-08-15 PROCEDURE — 11042 DBRDMT SUBQ TIS 1ST 20SQCM/<: CPT

## 2022-08-15 RX ORDER — LIDOCAINE HYDROCHLORIDE 20 MG/ML
JELLY TOPICAL ONCE
Status: CANCELLED | OUTPATIENT
Start: 2022-08-15 | End: 2022-08-15

## 2022-08-15 RX ORDER — IBUPROFEN 200 MG
200 TABLET ORAL EVERY 6 HOURS PRN
Status: ON HOLD | COMMUNITY
End: 2022-09-26 | Stop reason: HOSPADM

## 2022-08-15 RX ORDER — LIDOCAINE 50 MG/G
OINTMENT TOPICAL ONCE
Status: CANCELLED | OUTPATIENT
Start: 2022-08-15 | End: 2022-08-15

## 2022-08-15 RX ORDER — GINSENG 100 MG
CAPSULE ORAL ONCE
Status: CANCELLED | OUTPATIENT
Start: 2022-08-15 | End: 2022-08-15

## 2022-08-15 RX ORDER — LIDOCAINE HYDROCHLORIDE 40 MG/ML
SOLUTION TOPICAL ONCE
Status: COMPLETED | OUTPATIENT
Start: 2022-08-15 | End: 2022-08-15

## 2022-08-15 RX ORDER — BETAMETHASONE DIPROPIONATE 0.05 %
OINTMENT (GRAM) TOPICAL ONCE
Status: CANCELLED | OUTPATIENT
Start: 2022-08-15 | End: 2022-08-15

## 2022-08-15 RX ORDER — BACITRACIN ZINC AND POLYMYXIN B SULFATE 500; 1000 [USP'U]/G; [USP'U]/G
OINTMENT TOPICAL ONCE
Status: CANCELLED | OUTPATIENT
Start: 2022-08-15 | End: 2022-08-15

## 2022-08-15 RX ORDER — LIDOCAINE HYDROCHLORIDE 40 MG/ML
SOLUTION TOPICAL ONCE
Status: CANCELLED | OUTPATIENT
Start: 2022-08-15 | End: 2022-08-15

## 2022-08-15 RX ORDER — LIDOCAINE 40 MG/G
CREAM TOPICAL ONCE
Status: CANCELLED | OUTPATIENT
Start: 2022-08-15 | End: 2022-08-15

## 2022-08-15 RX ORDER — CLOBETASOL PROPIONATE 0.5 MG/G
OINTMENT TOPICAL ONCE
Status: CANCELLED | OUTPATIENT
Start: 2022-08-15 | End: 2022-08-15

## 2022-08-15 RX ORDER — GENTAMICIN SULFATE 1 MG/G
OINTMENT TOPICAL ONCE
Status: CANCELLED | OUTPATIENT
Start: 2022-08-15 | End: 2022-08-15

## 2022-08-15 RX ORDER — BACITRACIN, NEOMYCIN, POLYMYXIN B 400; 3.5; 5 [USP'U]/G; MG/G; [USP'U]/G
OINTMENT TOPICAL ONCE
Status: CANCELLED | OUTPATIENT
Start: 2022-08-15 | End: 2022-08-15

## 2022-08-15 RX ADMIN — LIDOCAINE HYDROCHLORIDE 10 ML: 40 SOLUTION TOPICAL at 13:20

## 2022-08-15 NOTE — PROGRESS NOTES
Wound Healing Center Followup Visit Note    Referring Physician : Mayte Mckeon MD  [id number] [facility] RECORD NUMBER:  [id number]  AGE: 57 y.o. GENDER: male  : 1960  EPISODE DATE:  8/15/2022    Subjective:     Chief Complaint   Patient presents with    Wound Check     Left foot      HISTORY of PRESENT ILLNESS HPI   Brain Higgins is a 57 y.o. male who presents today in regards to follow up evaluation and treatment of wound/ulcer. That patient's past medical, family and social hx were reviewed and changes were made if present. History of Wound Context:  The patient has had a wound of bilateral heels which was first noted approximately over a month ago. This has been treated with surgical debridement. On their initial visit to the wound healing center, 21 ,  the patient has noted that the wound has not been improving. The patient has had similar previous wounds in the past.       Pt is not on abx at time of initial visit. 3/22/21 - Wound VAC MWF continue to left heel. Aquacel Ag to right heel. Debrided toenails 1-5 in thickness and length. FU 1 week. IV Abx per Dr. Sahara Lewis. 21 - Wound VAC MWF to left heel. Cultures obtained. FU 1 week after visit with Dr. Sahara Lewis. PO Abx.  21 - DC wound VAC to left heel. Alginate to wound. FU 1 week  21 - Alginate and gentamicin ointment QOD. Partial WB to heel at 50% to foot with surgical shoe and walker. 5/3/21 - Alginate and gentamicin ointment QOD. MRI left heel     5/10/21 - Alginate and gentamicin ointment QOD. MRI left heel pending. 21 - Alginate and gentamicin ointment QOD. MRI reviewed confirming OM. Discussed HBOT referral and partial calcanectomy. He opts for HBOT consultation. 21 - Alginate and add gentamicin ointment QD. HBOT referral.  Patient hold off on partial calcanectomy. FU 1 week     21 - Alginate and add gentamicin ointment QD.   HBOT referral.  Patient hold off on partial calcanectomy. FU 1 week     6/28/21 - Alginate and add gentamicin ointment QD. HBOT referral.  Patient hold off on partial calcanectomy. FU 1 week. Patient gave me consent (verbal) to speak with his brother Lee Ann Hendricks regarding his at home arrangement. HBOT is on hold until patient is at home. 7/12/21 -  Alginate and add gentamicin ointment QD. HBOT referral.  Patient considering partial calcanectomy with flap. FU 1 week. 7/19/21 - Alginate and add gentamicin ointment QD. HBOT referral.  Patient considering partial calcanectomy with flap. FU 1 week. 8/2/21 - Alginate and add gentamicin ointment QD. HBOT referral.  Patient considering partial calcanectomy with flap. FU 1 week. 8/16/21 - Alginate and add gentamicin ointment QD. HBOT referral.  Patient considering partial calcanectomy with flap. FU 1 week. 8/23/21 - Alginate and add gentamicin ointment QD. HBOT referral.  Patient considering partial calcanectomy with flap. FU 1 week. 8/30/21 - Alginate and add gentamicin ointment QD. HBOT referral.  Patient considering partial calcanectomy with flap. FU 1 week. 9/20/21 - Alginate and add gentamicin ointment QD. HBOT referral.  Patient considering partial calcanectomy with flap. FU 1 week. 10/25/21 Alginate and add gentamicin ointment QD. HBOT referral.  Patient considering partial calcanectomy with flap. FU 1 week. 11/1/21 Alginate and add gentamicin ointment QD. HBOT referral.  Patient considering partial calcanectomy with flap. FU 1 week. 11/8/21 - Alginate and add gentamicin ointment QD. HBOT referral.  Patient considering partial calcanectomy with flap. FU 1 week. 11/15/21 - Alginate and add gentamicin ointment QD. HBOT referral.  Patient considering partial calcanectomy with flap. FU 1 week. 11/22/21 -  Alginate and add gentamicin ointment QD. HBOT referral.  Patient considering partial calcanectomy with flap. FU 1 week. 11/29/21 0 Alginate and gentamicin ointment. Possible DC home. HBOT referral.  Patient considering partial calcanectomy with flap. FU 1 week. 12/6/21 - Alginate and gentamicin ointment. Possible DC home. HBOT referral.  Patient considering partial calcanectomy with flap. FU 1 week. 12/13/21 - Alginate and gentamicin ointment. DC home. HBOT referral.  [facility]  referral for wound care and PT for GAIT training and safe DME use. Patient considering partial calcanectomy with flap. FU 1 week. 12/20/21 -  Alginate and gentamicin ointment. DC home. HBOT referral.     12/27/21 - HBOT consultation complete - to start HBOT after new year. 2-14-22 patient presents today for evaluation of a left heel wound. Patient transferred here to [facility] due to the fact that he is doing HBO, for convenience due to transportation. He was prior following with Dr. Jazmyne Lafleur. Patient overall doing well, no complaints. Tolerating dressings as well as hyperbarics. Physical exam demonstrates vascular intact. Wound appreciated posterior aspect with areas of devitalized nonviable tissue with beefy tissue noted as well. There is no purulence, odor, erythema or increase in temperature. Currently no exposed bone. 2-24-22  Patient overall doing well, no complaints. Tolerating dressings as well as hyperbarics. Physical exam demonstrates vascular intact. Wound appreciated posterior aspect with areas of devitalized nonviable tissue with beefy tissue noted as well. There is no purulence, odor, erythema or increase in temperature. 3-3-22 presents in follow-up. Relates dark area on his left great toenail as well as \"swelling\"  Left lower leg that he noticed recently. Patient denies any known trauma. Patient currently takes aspirin as well as Plavix. Physical exam demonstrates vascular intact.   Wound appreciated posterior aspect with areas of devitalized nonviable tissue with beefy feel the bone, but looks fairly clean  Patient is trying to continue offloading the heel ulcer, and also continuing the hyperbaric oxygen therapy treatments  The last lower extremity artery Doppler study done in 2021 revealed almost triphasic right ankle Doppler tracings and biphasic left ankle Doppler tracings with adequate flow to the heel and foot for potential tissue healing  Patient was rescheduled for repeat proximal artery Doppler study by his podiatrist, not done yet I do not see it in the epic  Inform the patient that last week I discussed with Dr. Balaji Roldan regarding continuing the hyperbaric oxygen therapy, possibility of skin grafting  Discussed with patient and nursing staff, please schedule appointment for the patient to see Dr. Balaji Roldan for his input pending return to have his podiatrist back to the wound care center for further management   we will make additional recommendations once the lower extremity artery Doppler studies completed  All his questions were answered  5/18/22  Concern that arterial flow is not adequate based off exam today  Left dp weakly biphasic, PT monophasic  Will hold hbo for now - reevaluate post angio  Discussed angiogram - he had previous with Dr Sol Sosa which addressed his left sfa, pop with plasty using 6x250 DCB impact  Concern that tibial disease is the primary issue vs. Previous intervention has shut down  5/25/22  Angio 5/31  Wound stable heel  5/31/22  L SFA, pop atherectomy, plasty with 5x250  L PT plasty 3x80, 2x120  6/22/22   Wound larger but bone covered with granulation tissue  L DP strongly biphasic, PT biphasic, no right groin hematoma  aquacell ag  Culture  Consideration for graft in future  Consideration for hbo in future  6/29/22  Anciboacter bumani light growth - tx with bactrim  Wound dose appear better  7/13/22  Wound smaller  Appearance better  Pt having transportation issues - will transition him back to [facility]  Will also have reassessed for hbo at [facility]  7/25/22  Wound smaller  Appearance better  Cultures taken. X-rays right foot repeated    8/8/22 - X-rays pending. Continue current regimen for now. FU 1 week. HBOT continue    8/15/22 - X-rays show signs of OM. MRI ordered. Continue current regimen for now. FU 1 week.   HBOT continue    Wound/Ulcer Pain Timing/Severity: none  Quality of pain: N/A  Severity:  0 / 10   Modifying Factors: None  Associated Signs/Symptoms: none     Ulcer Identification:  Ulcer Type: arterial and diabetic  Contributing Factors: diabetes     Diabetic/Pressure/Non Pressure Ulcers onl y:        PAST MEDICAL HISTORY      Diagnosis Date    Arthritis     Bilateral carpal tunnel syndrome 2016    Cerebral artery occlusion with cerebral infarction [facility])     Chronic back pain     CVA (cerebral vascular accident) ([address] 11/2015, 2017    Diabetes mellitus ([address]     Type 2    Diabetic foot ulcer with osteomyelitis ([address] 12/21/2021    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 2 diabetes mellitus, with necrosis of bone ([address] 12/21/2021    Hemiparesis affecting left side as late effect of cerebrovascular accident ([address]     LEFT SIDE NON DOMINANT FOLLOWING STROKE    Hyperlipidemia     Hypertension     Peripheral vascular angioplasty status 12/21/2021    Ulcer of left heel, with necrosis of bone ([address] 12/27/2021    Ulcer of left heel, with necrosis of muscle ([address] 12/21/2021    Vitamin D deficiency      Past Surgical History:   Procedure Laterality Date    CARPAL TUNNEL RELEASE  10/01/2016    Dr. Keyshawn Segundo Left 2/16/2021    LEFT FOOT DEBRIDEMENT WITH BONE BIOPSY, WOUND VAC APPLICATION performed by Peng Siegel DPM at [address]    TRANSESOPHAGEAL ECHOCARDIOGRAM N/A 2/22/2021    TRANSESOPHAGEAL ECHOCARDIOGRAM WITH BUBBLE STUDY performed by Kai Vidal MD at [address] 1/4/2021    EGD BIOPSY performed by Kevin Cisse DO at [facility] reviewed. No pertinent family history. Social History     Tobacco Use    Smoking status: Former     Packs/day: 1.00     Years: 25.00     Pack years: 25.00     Types: Cigarettes     Quit date: 2015     Years since quittin.6    Smokeless tobacco: Never   Vaping Use    Vaping Use: Never used   Substance Use Topics    Alcohol use: Not Currently     Comment: Former every day drinker for most of adult life    Drug use: No     Allergies   Allergen Reactions    Pcn [Penicillins] Anaphylaxis     Current Outpatient Medications on File Prior to Encounter   Medication Sig Dispense Refill    ibuprofen (ADVIL;MOTRIN) 200 MG tablet Take 200 mg by mouth every 6 hours as needed for Pain      metFORMIN (GLUCOPHAGE) 500 MG tablet Take 1 tablet by mouth in the morning and 1 tablet in the evening. Take with meals. 60 tablet 3    atorvastatin (LIPITOR) 40 MG tablet Take 1 tablet by mouth nightly 30 tablet 3    traMADol (ULTRAM) 50 MG tablet Take 1 tablet by mouth in the morning and 1 tablet in the evening. Do all this for 30 days. 60 tablet 2    gabapentin (NEURONTIN) 100 MG capsule Take 3 capsules by mouth 2 times daily for 30 days.  60 capsule 2    hydroCHLOROthiazide (HYDRODIURIL) 25 MG tablet Take 1 tablet by mouth daily 30 tablet 2    glimepiride (AMARYL) 2 MG tablet Take 1 tablet by mouth every morning (before breakfast) 30 tablet 2    diphenhydrAMINE (BENADRYL) 25 MG capsule Take 25 mg by mouth every 6 hours as needed for Itching      B Complex-C-E-Zn (STRESS B/ZINC) TABS Take 1 tablet by mouth daily      acetaminophen (TYLENOL) 325 MG tablet Take 650 mg by mouth every 6 hours as needed for Pain      ferrous sulfate (IRON 325) 325 (65 Fe) MG tablet Take 325 mg by mouth daily (with breakfast)      vitamin D (ERGOCALCIFEROL) 1.25 MG (28823 UT) CAPS capsule Take 1 capsule by mouth once a week 5 capsule 0    melatonin 3 MG TABS tablet Take 9 mg by mouth cm 08/15/22 1315   Wound Depth (cm) 0.6 cm 08/15/22 1315   Wound Surface Area (cm^2) 17.55 cm^2 08/15/22 1315   Change in Wound Size % (l*w) -45.16 08/15/22 1315   Wound Volume (cm^3) 10.53 cm^3 08/15/22 1315   Wound Healing % -24 08/15/22 1315   Post-Procedure Length (cm) 4.6 cm 08/15/22 1337   Post-Procedure Width (cm) 3 cm 08/15/22 1337   Post-Procedure Depth (cm) 0.7 cm 08/15/22 1337   Post-Procedure Surface Area (cm^2) 13.8 cm^2 08/15/22 1337   Post-Procedure Volume (cm^3) 9.66 cm^3 08/15/22 1337   Undermining Starts ___ O'Clock 9 05/25/22 1329   Undermining Ends___ O'Clock 3 05/25/22 1329   Undermining Maxium Distance (cm) [employer] 05/25/22 1329   Wound Assessment Eschar dry;Fibrin;Pink/red 08/15/22 1315   Drainage Amount Moderate 08/15/22 1315   Drainage Description Yellow 08/15/22 1315   Odor None 08/15/22 1315   Adrianne-wound Assessment Intact 08/15/22 1315   Number of days: 525       Wound 03/08/21 Heel Right #2 rt heel aquired 12/1/20 (Active)   Wound Image   04/05/21 0929   Dressing Status New dressing applied 03/18/21 1352   Wound Cleansed Cleansed with saline 04/01/21 1536   Dressing/Treatment Alginate;Collagen;Silicone border 34/11/85 1536   Offloading for Diabetic Foot Ulcers Post op shoe 04/01/21 1536   Wound Length (cm) 0 cm 04/05/21 0929   Wound Width (cm) 0 cm 04/05/21 0929   Wound Depth (cm) 0 cm 04/05/21 0929   Wound Surface Area (cm^2) 0 cm^2 04/05/21 0929   Change in Wound Size % (l*w) 100 04/05/21 0929   Wound Volume (cm^3) 0 cm^3 04/05/21 0929   Wound Healing % 100 04/05/21 0929   Post-Procedure Length (cm) 0 cm 04/05/21 1011   Post-Procedure Width (cm) 0 cm 04/05/21 1011   Post-Procedure Depth (cm) 0 cm 04/05/21 1011   Post-Procedure Surface Area (cm^2) 0 cm^2 04/05/21 1011   Post-Procedure Volume (cm^3) 0 cm^3 04/05/21 1011   Wound Assessment Fibrin 04/01/21 1357   Drainage Amount None 04/01/21 1357   Drainage Description Yellow 03/22/21 0911   Odor None 04/01/21 1357   Adrianne-wound Assessment Maceration 04/01/21 1357   Number of days: 525     Incision 02/19/21 Groin Right (Active)   Number of days: 542       Procedure Note  Indications:  Based on my examination of this patient's wound(s)/ulcer(s) today, debridement is required to promote healing and evaluate the wound base. Performed by: Tobi Rees DPM    Consent obtained:  Yes    Time out taken:  Yes    Pain Control:       Debridement:Excisional Debridement    Using curette the wound(s)/ulcer(s) was/were sharply debrided down through and including the removal of epidermis, dermis and subcutaneous tissue. Devitalized Tissue Debrided:  fibrin, biofilm, slough and exudate to stimulate bleeding to promote healing, post debridement good bleeding base and wound edges noted    Wound/Ulcer #: 1    Percent of Wound/Ulcer Debrided: 100%    Total Surface Area Debrided:  13.8 sq cm     Estimated Blood Loss:  Minimal  Hemostasis Achieved:  by pressure    Procedural Pain:  4  / 10   Post Procedural Pain:  2 / 10     Response to treatment:  Well tolerated by patient. Plan:   Treatment Note please see attached Discharge Instructions    Written patient dismissal instructions given to patient and signed by patient or POA. Discharge Instructions         Visit Discharge/Physician Orders     Assessment of pain at discharge: moderate     Anesthetic used: 4% liquid lidocaine solution     Discharge to:     Left via:ambulance     Accompanied by: self     ECF/HHA: [address]     Dressing Orders:  Cleanse left heel ulcer with normal saline solution, apply plain alginate Ag and dry dressing and change every day. Treatment Orders:   Wear prevalon boots or heel ayo while in bed. May be partial weight bearing up to 50% on left with surgical shoe. MRI to be scheduled for left foot.        [facility] followup visit: __ 1 week Dr Eduard Chery ____________  (Please note your next appointment above and if you are unable to keep, kindly give a 24 hour notice. Thank you.)     Physician signature:__________________________        If you experience any of the following, please call the [facility] [address] during business hours:     * Increase in Pain  * Temperature over 101  * Increase in drainage from your wound  * Drainage with a foul odor  * Bleeding  * Increase in swelling  * Need for compression bandage changes due to slippage, breakthrough drainage. If you need medical attention outside of the business hours of the [address] [place] [address] Road please contact your PCP or go to the nearest emergency room.                       Electronically signed by Almaz Acosta DPM on 8/15/2022 at 1:53 PM

## 2022-08-15 NOTE — PLAN OF CARE
Problem: Chronic Conditions and Co-morbidities  Goal: Patient's chronic conditions and co-morbidity symptoms are monitored and maintained or improved  Outcome: Progressing     Problem: Wound:  Goal: Will show signs of wound healing; wound closure and no evidence of infection  Description: Will show signs of wound healing; wound closure and no evidence of infection  Outcome: Progressing     Problem: Blood Glucose:  Goal: Ability to maintain appropriate glucose levels will improve  Description: Ability to maintain appropriate glucose levels will improve  Outcome: Progressing     Problem: Pain  Goal: Verbalizes/displays adequate comfort level or baseline comfort level  Outcome: Progressing

## 2022-08-22 ENCOUNTER — HOSPITAL ENCOUNTER (OUTPATIENT)
Dept: WOUND CARE | Age: 62
Discharge: HOME | End: 2022-08-22
Payer: COMMERCIAL

## 2022-08-22 ENCOUNTER — HOSPITAL ENCOUNTER (OUTPATIENT)
Dept: MRI IMAGING | Age: 62
Discharge: HOME | End: 2022-08-24
Payer: COMMERCIAL

## 2022-08-22 VITALS
DIASTOLIC BLOOD PRESSURE: 70 MMHG | RESPIRATION RATE: 18 BRPM | TEMPERATURE: 97.1 F | HEART RATE: 86 BPM | SYSTOLIC BLOOD PRESSURE: 122 MMHG

## 2022-08-22 DIAGNOSIS — E11.628 DIABETIC FOOT INFECTION (HCC): ICD-10-CM

## 2022-08-22 DIAGNOSIS — L97.424 DIABETIC ULCER OF LEFT HEEL ASSOCIATED WITH TYPE 2 DIABETES MELLITUS, WITH NECROSIS OF BONE (HCC): ICD-10-CM

## 2022-08-22 DIAGNOSIS — L08.9 DIABETIC FOOT INFECTION (HCC): ICD-10-CM

## 2022-08-22 DIAGNOSIS — L97.424 DIABETIC ULCER OF LEFT HEEL ASSOCIATED WITH TYPE 2 DIABETES MELLITUS, WITH NECROSIS OF BONE (HCC): Primary | ICD-10-CM

## 2022-08-22 DIAGNOSIS — E11.621 DIABETIC ULCER OF LEFT HEEL ASSOCIATED WITH TYPE 2 DIABETES MELLITUS, WITH NECROSIS OF BONE (HCC): Primary | ICD-10-CM

## 2022-08-22 DIAGNOSIS — E11.621 DIABETIC ULCER OF LEFT HEEL ASSOCIATED WITH TYPE 2 DIABETES MELLITUS, WITH NECROSIS OF BONE (HCC): ICD-10-CM

## 2022-08-22 PROCEDURE — 73718 MRI LOWER EXTREMITY W/O DYE: CPT

## 2022-08-22 PROCEDURE — 11042 DBRDMT SUBQ TIS 1ST 20SQCM/<: CPT

## 2022-08-22 RX ORDER — LIDOCAINE HYDROCHLORIDE 20 MG/ML
JELLY TOPICAL ONCE
Status: CANCELLED | OUTPATIENT
Start: 2022-08-22 | End: 2022-08-22

## 2022-08-22 RX ORDER — GINSENG 100 MG
CAPSULE ORAL ONCE
Status: CANCELLED | OUTPATIENT
Start: 2022-08-22 | End: 2022-08-22

## 2022-08-22 RX ORDER — BACITRACIN ZINC AND POLYMYXIN B SULFATE 500; 1000 [USP'U]/G; [USP'U]/G
OINTMENT TOPICAL ONCE
Status: CANCELLED | OUTPATIENT
Start: 2022-08-22 | End: 2022-08-22

## 2022-08-22 RX ORDER — CLOBETASOL PROPIONATE 0.5 MG/G
OINTMENT TOPICAL ONCE
Status: CANCELLED | OUTPATIENT
Start: 2022-08-22 | End: 2022-08-22

## 2022-08-22 RX ORDER — GENTAMICIN SULFATE 1 MG/G
OINTMENT TOPICAL ONCE
Status: CANCELLED | OUTPATIENT
Start: 2022-08-22 | End: 2022-08-22

## 2022-08-22 RX ORDER — BACITRACIN, NEOMYCIN, POLYMYXIN B 400; 3.5; 5 [USP'U]/G; MG/G; [USP'U]/G
OINTMENT TOPICAL ONCE
Status: CANCELLED | OUTPATIENT
Start: 2022-08-22 | End: 2022-08-22

## 2022-08-22 RX ORDER — LIDOCAINE HYDROCHLORIDE 40 MG/ML
SOLUTION TOPICAL ONCE
Status: COMPLETED | OUTPATIENT
Start: 2022-08-22 | End: 2022-08-22

## 2022-08-22 RX ORDER — LIDOCAINE HYDROCHLORIDE 40 MG/ML
SOLUTION TOPICAL ONCE
Status: CANCELLED | OUTPATIENT
Start: 2022-08-22 | End: 2022-08-22

## 2022-08-22 RX ORDER — LIDOCAINE 40 MG/G
CREAM TOPICAL ONCE
Status: CANCELLED | OUTPATIENT
Start: 2022-08-22 | End: 2022-08-22

## 2022-08-22 RX ORDER — LIDOCAINE 50 MG/G
OINTMENT TOPICAL ONCE
Status: CANCELLED | OUTPATIENT
Start: 2022-08-22 | End: 2022-08-22

## 2022-08-22 RX ORDER — BETAMETHASONE DIPROPIONATE 0.05 %
OINTMENT (GRAM) TOPICAL ONCE
Status: CANCELLED | OUTPATIENT
Start: 2022-08-22 | End: 2022-08-22

## 2022-08-22 RX ADMIN — LIDOCAINE HYDROCHLORIDE 10 ML: 40 SOLUTION TOPICAL at 14:01

## 2022-08-22 ASSESSMENT — PAIN SCALES - GENERAL: PAINLEVEL_OUTOF10: 1

## 2022-08-22 NOTE — PROGRESS NOTES
Wound Healing Center Followup Visit Note    Referring Physician : Harpreet Hernandez MD  [id number] [facility] RECORD NUMBER:  [id number]  AGE: 57 y.o. GENDER: male  : 1960  EPISODE DATE:  2022    Subjective:     No chief complaint on file. HISTORY of PRESENT ILLNESS HPI   Nidhi Willoughby is a 57 y.o. male who presents today in regards to follow up evaluation and treatment of wound/ulcer. That patient's past medical, family and social hx were reviewed and changes were made if present. History of Wound Context:  The patient has had a wound of bilateral heels which was first noted approximately over a month ago. This has been treated with surgical debridement. On their initial visit to the wound healing center, 21 ,  the patient has noted that the wound has not been improving. The patient has had similar previous wounds in the past.       Pt is not on abx at time of initial visit. 3/22/21 - Wound VAC MWF continue to left heel. Aquacel Ag to right heel. Debrided toenails 1-5 in thickness and length. FU 1 week. IV Abx per Dr. Hesham Goel. 21 - Wound VAC MWF to left heel. Cultures obtained. FU 1 week after visit with Dr. Hesham Goel. PO Abx.  21 - DC wound VAC to left heel. Alginate to wound. FU 1 week  21 - Alginate and gentamicin ointment QOD. Partial WB to heel at 50% to foot with surgical shoe and walker. 5/3/21 - Alginate and gentamicin ointment QOD. MRI left heel     5/10/21 - Alginate and gentamicin ointment QOD. MRI left heel pending. 21 - Alginate and gentamicin ointment QOD. MRI reviewed confirming OM. Discussed HBOT referral and partial calcanectomy. He opts for HBOT consultation. 21 - Alginate and add gentamicin ointment QD. HBOT referral.  Patient hold off on partial calcanectomy. FU 1 week     21 - Alginate and add gentamicin ointment QD. HBOT referral.  Patient hold off on partial calcanectomy.   FU 1 week 6/28/21 - Alginate and add gentamicin ointment QD. HBOT referral.  Patient hold off on partial calcanectomy. FU 1 week. Patient gave me consent (verbal) to speak with his brother Haritha Roper regarding his at home arrangement. HBOT is on hold until patient is at home. 7/12/21 -  Alginate and add gentamicin ointment QD. HBOT referral.  Patient considering partial calcanectomy with flap. FU 1 week. 7/19/21 - Alginate and add gentamicin ointment QD. HBOT referral.  Patient considering partial calcanectomy with flap. FU 1 week. 8/2/21 - Alginate and add gentamicin ointment QD. HBOT referral.  Patient considering partial calcanectomy with flap. FU 1 week. 8/16/21 - Alginate and add gentamicin ointment QD. HBOT referral.  Patient considering partial calcanectomy with flap. FU 1 week. 8/23/21 - Alginate and add gentamicin ointment QD. HBOT referral.  Patient considering partial calcanectomy with flap. FU 1 week. 8/30/21 - Alginate and add gentamicin ointment QD. HBOT referral.  Patient considering partial calcanectomy with flap. FU 1 week. 9/20/21 - Alginate and add gentamicin ointment QD. HBOT referral.  Patient considering partial calcanectomy with flap. FU 1 week. 10/25/21 Alginate and add gentamicin ointment QD. HBOT referral.  Patient considering partial calcanectomy with flap. FU 1 week. 11/1/21 Alginate and add gentamicin ointment QD. HBOT referral.  Patient considering partial calcanectomy with flap. FU 1 week. 11/8/21 - Alginate and add gentamicin ointment QD. HBOT referral.  Patient considering partial calcanectomy with flap. FU 1 week. 11/15/21 - Alginate and add gentamicin ointment QD. HBOT referral.  Patient considering partial calcanectomy with flap. FU 1 week. 11/22/21 -  Alginate and add gentamicin ointment QD. HBOT referral.  Patient considering partial calcanectomy with flap. FU 1 week.       11/29/21 0 Alginate and gentamicin ointment. Possible DC home. HBOT referral.  Patient considering partial calcanectomy with flap. FU 1 week. 12/6/21 - Alginate and gentamicin ointment. Possible DC home. HBOT referral.  Patient considering partial calcanectomy with flap. FU 1 week. 12/13/21 - Alginate and gentamicin ointment. DC home. HBOT referral.  [facility] referral for wound care and PT for GAIT training and safe DME use. Patient considering partial calcanectomy with flap. FU 1 week. 12/20/21 -  Alginate and gentamicin ointment. DC home. HBOT referral.     12/27/21 - HBOT consultation complete - to start HBOT after new year. 2-14-22 patient presents today for evaluation of a left heel wound. Patient transferred here to [facility] due to the fact that he is doing HBO, for convenience due to transportation. He was prior following with Dr. Viky Sanderson. Patient overall doing well, no complaints. Tolerating dressings as well as hyperbarics. Physical exam demonstrates vascular intact. Wound appreciated posterior aspect with areas of devitalized nonviable tissue with beefy tissue noted as well. There is no purulence, odor, erythema or increase in temperature. Currently no exposed bone. 2-24-22  Patient overall doing well, no complaints. Tolerating dressings as well as hyperbarics. Physical exam demonstrates vascular intact. Wound appreciated posterior aspect with areas of devitalized nonviable tissue with beefy tissue noted as well. There is no purulence, odor, erythema or increase in temperature. 3-3-22 presents in follow-up. Relates dark area on his left great toenail as well as \"swelling\"  Left lower leg that he noticed recently. Patient denies any known trauma. Patient currently takes aspirin as well as Plavix. Physical exam demonstrates vascular intact. Wound appreciated posterior aspect with areas of devitalized nonviable tissue with beefy tissue noted as well.   There is no purulence, odor, erythema or increase in temperature. Left great toenail proximal lateral small area, subungual hematoma, stable. 2 fluctuant areas adjacent left lower leg anterior lateral aspect. There is no erythema or increase in temperature. After oral consent under sterile technique and utilizing 1% lidocaine plain, 21-gauge needle as well as 11 blade stab incision expressing approximately 20 cc of hematoma formation, this was cultured. Monitor closely if he notices any recurrence recommend [facility] for further evaluation    3-28-22 last seen March 3rd, transportation issues. Physical exam, vascular intact. Wound appreciated posterior aspect left heel with areas of devitalized nonviable tissue, increasing/seen. There is no purulence, odor, erythema or increase in temperature. Change treatment to Santyl. Patient would benefit from continued HBO.    4-4-22 Physical exam, vascular intact. Wound appreciated posterior aspect left heel with areas of devitalized nonviable tissue w/ areas of beefy tissue. There is no purulence, odor, erythema or increase in temperature. Santyl. HBO. Improved at present. 4-11-22 Wound appreciated posterior aspect left heel with areas of devitalized nonviable tissue w/ areas of beefy tissue. There is no purulence, odor, erythema or increase in temperature. Santyl. HBO. 4-18-22 Wound appreciated posterior aspect left heel with areas of devitalized nonviable tissue w/ areas of beefy tissue. There is no purulence, odor, erythema or increase in temperature. Santyl. HBO. Discussed possible graft in near future, will get updated vasc studies.     5/9/2022  Patient did not come last 2 weeks and also did not go for hyperbaric, last week, because of transportation problems  Patient tells me at one time his podiatrist Yaron Fontanez informed him that he may consider skin grafting of the wound  On today's examination, I can literally feel the bone, but looks fairly clean  Patient is trying to continue offloading the heel ulcer, and also continuing the hyperbaric oxygen therapy treatments  The last lower extremity artery Doppler study done in 2021 revealed almost triphasic right ankle Doppler tracings and biphasic left ankle Doppler tracings with adequate flow to the heel and foot for potential tissue healing  Patient was rescheduled for repeat proximal artery Doppler study by his podiatrist, not done yet I do not see it in the epic  Inform the patient that last week I discussed with Dr. Devon José regarding continuing the hyperbaric oxygen therapy, possibility of skin grafting  Discussed with patient and nursing staff, please schedule appointment for the patient to see Dr. Devon José for his input pending return to have his podiatrist back to the wound care center for further management   we will make additional recommendations once the lower extremity artery Doppler studies completed  All his questions were answered  5/18/22  Concern that arterial flow is not adequate based off exam today  Left dp weakly biphasic, PT monophasic  Will hold hbo for now - reevaluate post angio  Discussed angiogram - he had previous with Dr Rosie Álvarez which addressed his left sfa, pop with plasty using 6x250 DCB impact  Concern that tibial disease is the primary issue vs. Previous intervention has shut down  5/25/22  Angio 5/31  Wound stable heel  5/31/22  L SFA, pop atherectomy, plasty with 5x250  L PT plasty 3x80, 2x120  6/22/22   Wound larger but bone covered with granulation tissue  L DP strongly biphasic, PT biphasic, no right groin hematoma  aquacell ag  Culture  Consideration for graft in future  Consideration for hbo in future  6/29/22  Anciboacter bumani light growth - tx with bactrim  Wound dose appear better  7/13/22  Wound smaller  Appearance better  Pt having transportation issues - will transition him back to [facility] at [address] [place] [address] [address] also have reassessed for hbo at [facility]  7/25/22  Wound smaller  Appearance better  Cultures taken. X-rays right foot repeated    8/8/22 - X-rays pending. Continue current regimen for now. FU 1 week. HBOT continue    8/15/22 - X-rays show signs of OM. MRI ordered. Continue current regimen for now. FU 1 week. HBOT continue    8/22/22- MRI pending. Continue current regimen for now. FU 1 week.   HBOT continue    Wound/Ulcer Pain Timing/Severity: none  Quality of pain: N/A  Severity:  0 / 10   Modifying Factors: None  Associated Signs/Symptoms: none     Ulcer Identification:  Ulcer Type: arterial and diabetic  Contributing Factors: diabetes     Diabetic/Pressure/Non Pressure Ulcers onl y:        PAST MEDICAL HISTORY      Diagnosis Date    Arthritis     Bilateral carpal tunnel syndrome 2016    Cerebral artery occlusion with cerebral infarction [facility])     Chronic back pain     CVA (cerebral vascular accident) ([address] 11/2015, 2017    Diabetes mellitus ([address]     Type 2    Diabetic foot ulcer with osteomyelitis ([address] 12/21/2021    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 2 diabetes mellitus, with necrosis of bone ([address] 12/21/2021    Hemiparesis affecting left side as late effect of cerebrovascular accident ([address]     LEFT SIDE NON DOMINANT FOLLOWING STROKE    Hyperlipidemia     Hypertension     Peripheral vascular angioplasty status 12/21/2021    Ulcer of left heel, with necrosis of bone ([address] 12/27/2021    Ulcer of left heel, with necrosis of muscle ([address] 12/21/2021    Vitamin D deficiency      Past Surgical History:   Procedure Laterality Date    CARPAL TUNNEL RELEASE  10/01/2016    Dr. Patricia Heard Left 2/16/2021    LEFT FOOT DEBRIDEMENT WITH BONE BIOPSY, WOUND VAC APPLICATION performed by Ousmane Walters DPM at [address]    TRANSESOPHAGEAL ECHOCARDIOGRAM N/A 2/22/2021    TRANSESOPHAGEAL ECHOCARDIOGRAM WITH BUBBLE STUDY performed by Argentina Ng MD at  ENDOSCOPY    UPPER GASTROINTESTINAL ENDOSCOPY N/A 2021    EGD BIOPSY performed by Angle Young DO at [id number]     No family history on file. Social History     Tobacco Use    Smoking status: Former     Packs/day: 1.00     Years: 25.00     Pack years: 25.00     Types: Cigarettes     Quit date: 2015     Years since quittin.6    Smokeless tobacco: Never   Vaping Use    Vaping Use: Never used   Substance Use Topics    Alcohol use: Not Currently     Comment: Former every day drinker for most of adult life    Drug use: No     Allergies   Allergen Reactions    Pcn [Penicillins] Anaphylaxis     Current Outpatient Medications on File Prior to Encounter   Medication Sig Dispense Refill    metFORMIN (GLUCOPHAGE) 500 MG tablet Take 1 tablet by mouth in the morning and 1 tablet in the evening. Take with meals. 60 tablet 3    atorvastatin (LIPITOR) 40 MG tablet Take 1 tablet by mouth nightly 30 tablet 3    ibuprofen (ADVIL;MOTRIN) 200 MG tablet Take 200 mg by mouth every 6 hours as needed for Pain      traMADol (ULTRAM) 50 MG tablet Take 1 tablet by mouth in the morning and 1 tablet in the evening. Do all this for 30 days. 60 tablet 2    gabapentin (NEURONTIN) 100 MG capsule Take 3 capsules by mouth 2 times daily for 30 days.  60 capsule 2    hydroCHLOROthiazide (HYDRODIURIL) 25 MG tablet Take 1 tablet by mouth daily 30 tablet 2    glimepiride (AMARYL) 2 MG tablet Take 1 tablet by mouth every morning (before breakfast) 30 tablet 2    diphenhydrAMINE (BENADRYL) 25 MG capsule Take 25 mg by mouth every 6 hours as needed for Itching      B Complex-C-E-Zn (STRESS B/ZINC) TABS Take 1 tablet by mouth daily      acetaminophen (TYLENOL) 325 MG tablet Take 650 mg by mouth every 6 hours as needed for Pain      ferrous sulfate (IRON 325) 325 (65 Fe) MG tablet Take 325 mg by mouth daily (with breakfast)      vitamin D (ERGOCALCIFEROL) 1.25 MG (93807 UT) CAPS capsule Take 1 capsule by mouth once a week 5 capsule 0 melatonin 3 MG TABS tablet Take 9 mg by mouth nightly as needed      aspirin 81 MG EC tablet Take 81 mg by mouth daily      clopidogrel (PLAVIX) 75 MG tablet Take 1 tablet by mouth daily 30 tablet 3     No current facility-administered medications on file prior to encounter.        REVIEW OF SYSTEMS See HPI    Objective:    /70   Pulse 86   Temp 97.1 °F (36.2 °C) (Temporal)   Resp 18   Wt Readings from Last 3 Encounters:   08/15/22 192 lb (87.1 kg)   08/09/22 192 lb (87.1 kg)   08/08/22 190 lb (86.2 kg)     PHYSICAL EXAM  CONSTITUTIONAL:   Awake, alert, cooperative   EYES:  lids and lashes normal   ENT: external ears and nose without lesions   NECK:  supple, symmetrical, trachea midline   SKIN:  Open wound     Assessment:     Problem List Items Addressed This Visit       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 2 diabetes mellitus, with necrosis of bone ([facility] - Primary (Chronic)    Relevant Orders    Initiate Outpatient Wound Care Protocol    Diabetic foot infection ([facility]    Relevant Orders    Initiate Outpatient Wound Care Protocol       Pre Debridement Measurements:  Are located in the [facility]  Documentation Flow Sheet  Post Debridement Measurements:  Wound/Ulcer Descriptions are Pre Debridement except measurements:     Wound 12/26/20 Arm Left (Active)   Number of days: 603       Wound 02/15/21 Heel Left (Active)   Number of days: 553       Wound 02/15/21 Heel Right (Active)   Number of days: 505       Wound 03/08/21 Heel Left #1 left heel aquired 12/1/20 (Active)   Wound Image   08/08/22 1300   Wound Etiology Diabetic 02/14/22 1344   Dressing Status New dressing applied 08/08/22 1349   Wound Cleansed Cleansed with saline 08/08/22 1349   Dressing/Treatment Alginate with Ag;ABD 08/08/22 1349   Offloading for Diabetic Foot Ulcers Post op shoe 08/08/22 1349   Wound Length (cm) 4.6 cm 08/22/22 1355   Wound Width (cm) 3.5 cm 08/22/22 1355   Wound Depth (cm) 0.9 cm 08/22/22 1355   Wound Surface Area (cm^2) 16.1 cm^2 08/22/22 1355   Change in Wound Size % (l*w) -33.17 08/22/22 1355   Wound Volume (cm^3) 14.49 cm^3 08/22/22 1355   Wound Healing % -71 08/22/22 1355   Post-Procedure Length (cm) 4.7 cm 08/22/22 1433   Post-Procedure Width (cm) 3.6 cm 08/22/22 1433   Post-Procedure Depth (cm) 1 cm 08/22/22 1433   Post-Procedure Surface Area (cm^2) 16.92 cm^2 08/22/22 1433   Post-Procedure Volume (cm^3) 16.92 cm^3 08/22/22 1433   Undermining Starts ___ O'Clock 9 05/25/22 1329   Undermining Ends___ O'Clock 3 05/25/22 1329   Undermining Maxium Distance (cm) Delia@[employer] 05/25/22 1329   Wound Assessment Bleeding;Pink/red;Fibrin 08/22/22 1355   Drainage Amount Copious 08/22/22 1355   Drainage Description Sanguinous 08/22/22 1355   Odor None 08/22/22 1355   Adrianne-wound Assessment Blanchable erythema; Maceration 08/22/22 1355   Number of days: 532       Wound 03/08/21 Heel Right #2 rt heel aquired 12/1/20 (Active)   Wound Image   04/05/21 0929   Dressing Status New dressing applied 03/18/21 1352   Wound Cleansed Cleansed with saline 04/01/21 1536   Dressing/Treatment Alginate;Collagen;Silicone border 76/25/04 1536   Offloading for Diabetic Foot Ulcers Post op shoe 04/01/21 1536   Wound Length (cm) 0 cm 04/05/21 0929   Wound Width (cm) 0 cm 04/05/21 0929   Wound Depth (cm) 0 cm 04/05/21 0929   Wound Surface Area (cm^2) 0 cm^2 04/05/21 0929   Change in Wound Size % (l*w) 100 04/05/21 0929   Wound Volume (cm^3) 0 cm^3 04/05/21 0929   Wound Healing % 100 04/05/21 0929   Post-Procedure Length (cm) 0 cm 04/05/21 1011   Post-Procedure Width (cm) 0 cm 04/05/21 1011   Post-Procedure Depth (cm) 0 cm 04/05/21 1011   Post-Procedure Surface Area (cm^2) 0 cm^2 04/05/21 1011   Post-Procedure Volume (cm^3) 0 cm^3 04/05/21 1011   Wound Assessment Fibrin 04/01/21 1357   Drainage Amount None 04/01/21 1357   Drainage Description Yellow 03/22/21 0911   Odor None 04/01/21 1357   Adrianne-wound Assessment Maceration 04/01/21 1357   Number of days: 654 Incision 02/19/21 Groin Right (Active)   Number of days: 549       Procedure Note  Indications:  Based on my examination of this patient's wound(s)/ulcer(s) today, debridement is required to promote healing and evaluate the wound base. Performed by: Veronica Neves DPM    Consent obtained:  Yes    Time out taken:  Yes    Pain Control:       Debridement:Excisional Debridement    Using curette the wound(s)/ulcer(s) was/were sharply debrided down through and including the removal of epidermis, dermis and subcutaneous tissue. Devitalized Tissue Debrided:  fibrin, biofilm, slough and exudate to stimulate bleeding to promote healing, post debridement good bleeding base and wound edges noted    Wound/Ulcer #: 1    Percent of Wound/Ulcer Debrided: 100%    Total Surface Area Debrided:  16.92  sq cm     Estimated Blood Loss:  Minimal  Hemostasis Achieved:  by pressure    Procedural Pain:  4  / 10   Post Procedural Pain:  2 / 10     Response to treatment:  Well tolerated by patient. Plan:   Treatment Note please see attached Discharge Instructions    Written patient dismissal instructions given to patient and signed by patient or POA. Discharge Instructions         Visit Discharge/Physician Orders     Assessment of pain at discharge: moderate     Anesthetic used: 4% liquid lidocaine solution     Discharge to:     Left via:ambulance     Accompanied by: self     ECF/HHA: [address]     Dressing Orders:  Cleanse left heel ulcer with normal saline solution, apply plain alginate Ag and dry dressing and change every day. Treatment Orders:   Wear prevalon boots or heel ayo while in bed. May be partial weight bearing up to 50% on left with surgical shoe. MRI of left foot-        [facility] followup visit: __ 1 week Dr Elton Crowe ____________  (Please note your next appointment above and if you are unable to keep, kindly give a 24 hour notice.  Thank you.)     Physician signature:__________________________        If you experience any of the following, please call the [facility] during business hours:     * Increase in Pain  * Temperature over 101  * Increase in drainage from your wound  * Drainage with a foul odor  * Bleeding  * Increase in swelling  * Need for compression bandage changes due to slippage, breakthrough drainage. If you need medical attention outside of the business hours of the [address] please contact your PCP or go to the nearest emergency room.                                                  Electronically signed by Bee Nina DPM on 8/22/2022 at 2:51 PM

## 2022-08-23 DIAGNOSIS — E08.00 DIABETES MELLITUS DUE TO UNDERLYING CONDITION WITH HYPEROSMOLARITY WITHOUT COMA, UNSPECIFIED WHETHER LONG TERM INSULIN USE (HCC): Primary | ICD-10-CM

## 2022-08-23 RX ORDER — GLIMEPIRIDE 2 MG/1
2 TABLET ORAL
Qty: 30 TABLET | Refills: 2 | Status: SHIPPED | OUTPATIENT
Start: 2022-08-23

## 2022-08-23 NOTE — DISCHARGE INSTRUCTIONS
Visit Discharge/Physician Orders     Assessment of pain at discharge: moderate     Anesthetic used: 4% liquid lidocaine solution     Discharge to:     Left via:ambulance     Accompanied by: self     [facility]: [facility]     Dressing Orders:  Cleanse left heel ulcer with normal saline solution, apply plain alginate Ag and dry dressing and change every day. Treatment Orders:   Wear prevalon boots or heel ayo while in bed. May be partial weight bearing up to 50% on left with surgical shoe. MRI of left foot-reviewed. Dr Alberto Ding to discuss  Culture taken 8/29/22        [address] [place] [address] followup visit: __ 2 week Dr Alberto Ding ____________  (Please note your next appointment above and if you are unable to keep, kindly give a 24 hour notice. Thank you.)     Physician signature:__________________________        If you experience any of the following, please call the [facility] during business hours:     * Increase in Pain  * Temperature over 101  * Increase in drainage from your wound  * Drainage with a foul odor  * Bleeding  * Increase in swelling  * Need for compression bandage changes due to slippage, breakthrough drainage. If you need medical attention outside of the business hours of the [address] please contact your PCP or go to the nearest emergency room.

## 2022-08-25 RX ORDER — CYCLOBENZAPRINE HCL 5 MG
TABLET ORAL
COMMUNITY
Start: 2022-07-18 | End: 2022-08-26 | Stop reason: SDUPTHER

## 2022-08-26 RX ORDER — CYCLOBENZAPRINE HCL 5 MG
TABLET ORAL
Qty: 30 TABLET | Refills: 2 | Status: SHIPPED | OUTPATIENT
Start: 2022-08-26

## 2022-08-29 ENCOUNTER — HOSPITAL ENCOUNTER (OUTPATIENT)
Dept: WOUND CARE | Age: 62
Discharge: HOME | End: 2022-08-29
Payer: COMMERCIAL

## 2022-08-29 VITALS
HEIGHT: 71 IN | TEMPERATURE: 97.4 F | RESPIRATION RATE: 18 BRPM | WEIGHT: 192 LBS | SYSTOLIC BLOOD PRESSURE: 140 MMHG | HEART RATE: 85 BPM | BODY MASS INDEX: 26.88 KG/M2 | DIASTOLIC BLOOD PRESSURE: 70 MMHG

## 2022-08-29 DIAGNOSIS — L08.9 DIABETIC FOOT INFECTION (HCC): Primary | ICD-10-CM

## 2022-08-29 DIAGNOSIS — E11.628 DIABETIC FOOT INFECTION (HCC): Primary | ICD-10-CM

## 2022-08-29 DIAGNOSIS — L97.424 DIABETIC ULCER OF LEFT HEEL ASSOCIATED WITH TYPE 2 DIABETES MELLITUS, WITH NECROSIS OF BONE (HCC): ICD-10-CM

## 2022-08-29 DIAGNOSIS — E11.621 DIABETIC ULCER OF LEFT HEEL ASSOCIATED WITH TYPE 2 DIABETES MELLITUS, WITH NECROSIS OF BONE (HCC): ICD-10-CM

## 2022-08-29 PROCEDURE — 87205 SMEAR GRAM STAIN: CPT

## 2022-08-29 PROCEDURE — 87147 CULTURE TYPE IMMUNOLOGIC: CPT

## 2022-08-29 PROCEDURE — 87186 SC STD MICRODIL/AGAR DIL: CPT

## 2022-08-29 PROCEDURE — 87077 CULTURE AEROBIC IDENTIFY: CPT

## 2022-08-29 PROCEDURE — 87070 CULTURE OTHR SPECIMN AEROBIC: CPT

## 2022-08-29 PROCEDURE — 11042 DBRDMT SUBQ TIS 1ST 20SQCM/<: CPT

## 2022-08-29 PROCEDURE — 11042 DBRDMT SUBQ TIS 1ST 20SQCM/<: CPT | Performed by: NURSE PRACTITIONER

## 2022-08-29 PROCEDURE — 87075 CULTR BACTERIA EXCEPT BLOOD: CPT

## 2022-08-29 RX ORDER — LIDOCAINE HYDROCHLORIDE 20 MG/ML
JELLY TOPICAL ONCE
Status: CANCELLED | OUTPATIENT
Start: 2022-08-29 | End: 2022-08-29

## 2022-08-29 RX ORDER — CLOBETASOL PROPIONATE 0.5 MG/G
OINTMENT TOPICAL ONCE
Status: CANCELLED | OUTPATIENT
Start: 2022-08-29 | End: 2022-08-29

## 2022-08-29 RX ORDER — LIDOCAINE 50 MG/G
OINTMENT TOPICAL ONCE
Status: CANCELLED | OUTPATIENT
Start: 2022-08-29 | End: 2022-08-29

## 2022-08-29 RX ORDER — LIDOCAINE HYDROCHLORIDE 40 MG/ML
SOLUTION TOPICAL ONCE
Status: COMPLETED | OUTPATIENT
Start: 2022-08-29 | End: 2022-08-29

## 2022-08-29 RX ORDER — BACITRACIN ZINC AND POLYMYXIN B SULFATE 500; 1000 [USP'U]/G; [USP'U]/G
OINTMENT TOPICAL ONCE
Status: CANCELLED | OUTPATIENT
Start: 2022-08-29 | End: 2022-08-29

## 2022-08-29 RX ORDER — BETAMETHASONE DIPROPIONATE 0.05 %
OINTMENT (GRAM) TOPICAL ONCE
Status: CANCELLED | OUTPATIENT
Start: 2022-08-29 | End: 2022-08-29

## 2022-08-29 RX ORDER — GINSENG 100 MG
CAPSULE ORAL ONCE
Status: CANCELLED | OUTPATIENT
Start: 2022-08-29 | End: 2022-08-29

## 2022-08-29 RX ORDER — LIDOCAINE HYDROCHLORIDE 40 MG/ML
SOLUTION TOPICAL ONCE
Status: CANCELLED | OUTPATIENT
Start: 2022-08-29 | End: 2022-08-29

## 2022-08-29 RX ORDER — GENTAMICIN SULFATE 1 MG/G
OINTMENT TOPICAL ONCE
Status: CANCELLED | OUTPATIENT
Start: 2022-08-29 | End: 2022-08-29

## 2022-08-29 RX ORDER — BACITRACIN, NEOMYCIN, POLYMYXIN B 400; 3.5; 5 [USP'U]/G; MG/G; [USP'U]/G
OINTMENT TOPICAL ONCE
Status: CANCELLED | OUTPATIENT
Start: 2022-08-29 | End: 2022-08-29

## 2022-08-29 RX ORDER — LIDOCAINE 40 MG/G
CREAM TOPICAL ONCE
Status: CANCELLED | OUTPATIENT
Start: 2022-08-29 | End: 2022-08-29

## 2022-08-29 RX ADMIN — LIDOCAINE HYDROCHLORIDE 10 ML: 40 SOLUTION TOPICAL at 13:35

## 2022-08-29 NOTE — PROGRESS NOTES
partial calcanectomy. FU 1 week     6/28/21 - Alginate and add gentamicin ointment QD. HBOT referral.  Patient hold off on partial calcanectomy. FU 1 week. Patient gave me consent (verbal) to speak with his brother Michael Mccormick regarding his at home arrangement. HBOT is on hold until patient is at home. 7/12/21 -  Alginate and add gentamicin ointment QD. HBOT referral.  Patient considering partial calcanectomy with flap. FU 1 week. 7/19/21 - Alginate and add gentamicin ointment QD. HBOT referral.  Patient considering partial calcanectomy with flap. FU 1 week. 8/2/21 - Alginate and add gentamicin ointment QD. HBOT referral.  Patient considering partial calcanectomy with flap. FU 1 week. 8/16/21 - Alginate and add gentamicin ointment QD. HBOT referral.  Patient considering partial calcanectomy with flap. FU 1 week. 8/23/21 - Alginate and add gentamicin ointment QD. HBOT referral.  Patient considering partial calcanectomy with flap. FU 1 week. 8/30/21 - Alginate and add gentamicin ointment QD. HBOT referral.  Patient considering partial calcanectomy with flap. FU 1 week. 9/20/21 - Alginate and add gentamicin ointment QD. HBOT referral.  Patient considering partial calcanectomy with flap. FU 1 week. 10/25/21 Alginate and add gentamicin ointment QD. HBOT referral.  Patient considering partial calcanectomy with flap. FU 1 week. 11/1/21 Alginate and add gentamicin ointment QD. HBOT referral.  Patient considering partial calcanectomy with flap. FU 1 week. 11/8/21 - Alginate and add gentamicin ointment QD. HBOT referral.  Patient considering partial calcanectomy with flap. FU 1 week. 11/15/21 - Alginate and add gentamicin ointment QD. HBOT referral.  Patient considering partial calcanectomy with flap. FU 1 week. 11/22/21 -  Alginate and add gentamicin ointment QD. HBOT referral.  Patient considering partial calcanectomy with flap. FU 1 week. 11/29/21 0 Alginate and gentamicin ointment. Possible DC home. HBOT referral.  Patient considering partial calcanectomy with flap. FU 1 week. 12/6/21 - Alginate and gentamicin ointment. Possible DC home. HBOT referral.  Patient considering partial calcanectomy with flap. FU 1 week. 12/13/21 - Alginate and gentamicin ointment. DC home. HBOT referral.  [facility] referral for wound care and PT for GAIT training and safe DME use. Patient considering partial calcanectomy with flap. FU 1 week. 12/20/21 -  Alginate and gentamicin ointment. DC home. HBOT referral.     12/27/21 - HBOT consultation complete - to start HBOT after new year. 2-14-22 patient presents today for evaluation of a left heel wound. Patient transferred here to [facility] due to the fact that he is doing HBO, for convenience due to transportation. He was prior following with Dr. Nanda Genao. Patient overall doing well, no complaints. Tolerating dressings as well as hyperbarics. Physical exam demonstrates vascular intact. Wound appreciated posterior aspect with areas of devitalized nonviable tissue with beefy tissue noted as well. There is no purulence, odor, erythema or increase in temperature. Currently no exposed bone. 2-24-22  Patient overall doing well, no complaints. Tolerating dressings as well as hyperbarics. Physical exam demonstrates vascular intact. Wound appreciated posterior aspect with areas of devitalized nonviable tissue with beefy tissue noted as well. There is no purulence, odor, erythema or increase in temperature. 3-3-22 presents in follow-up. Relates dark area on his left great toenail as well as \"swelling\"  Left lower leg that he noticed recently. Patient denies any known trauma. Patient currently takes aspirin as well as Plavix. Physical exam demonstrates vascular intact.   Wound appreciated posterior aspect with areas of devitalized nonviable tissue with beefy tissue noted as well. There is no purulence, odor, erythema or increase in temperature. Left great toenail proximal lateral small area, subungual hematoma, stable. 2 fluctuant areas adjacent left lower leg anterior lateral aspect. There is no erythema or increase in temperature. After oral consent under sterile technique and utilizing 1% lidocaine plain, 21-gauge needle as well as 11 blade stab incision expressing approximately 20 cc of hematoma formation, this was cultured. Monitor closely if he notices any recurrence recommend [facility] for further evaluation    3-28-22 last seen March 3rd, transportation issues. Physical exam, vascular intact. Wound appreciated posterior aspect left heel with areas of devitalized nonviable tissue, increasing/seen. There is no purulence, odor, erythema or increase in temperature. Change treatment to Santyl. Patient would benefit from continued HBO.    4-4-22 Physical exam, vascular intact. Wound appreciated posterior aspect left heel with areas of devitalized nonviable tissue w/ areas of beefy tissue. There is no purulence, odor, erythema or increase in temperature. Santyl. HBO. Improved at present. 4-11-22 Wound appreciated posterior aspect left heel with areas of devitalized nonviable tissue w/ areas of beefy tissue. There is no purulence, odor, erythema or increase in temperature. Santyl. HBO. 4-18-22 Wound appreciated posterior aspect left heel with areas of devitalized nonviable tissue w/ areas of beefy tissue. There is no purulence, odor, erythema or increase in temperature. Santyl. HBO. Discussed possible graft in near future, will get updated vasc studies.     5/9/2022  Patient did not come last 2 weeks and also did not go for hyperbaric, last week, because of transportation problems  Patient tells me at one time his podiatrist Fareed Gonzalez informed him that he may consider skin grafting of the wound  On today's examination, I can literally feel the bone, but looks fairly clean  Patient is trying to continue offloading the heel ulcer, and also continuing the hyperbaric oxygen therapy treatments  The last lower extremity artery Doppler study done in 2021 revealed almost triphasic right ankle Doppler tracings and biphasic left ankle Doppler tracings with adequate flow to the heel and foot for potential tissue healing  Patient was rescheduled for repeat proximal artery Doppler study by his podiatrist, not done yet I do not see it in the epic  Inform the patient that last week I discussed with Dr. Migel Echols regarding continuing the hyperbaric oxygen therapy, possibility of skin grafting  Discussed with patient and nursing staff, please schedule appointment for the patient to see Dr. Migel Echols for his input pending return to have his podiatrist back to the wound care center for further management   we will make additional recommendations once the lower extremity artery Doppler studies completed  All his questions were answered  5/18/22  Concern that arterial flow is not adequate based off exam today  Left dp weakly biphasic, PT monophasic  Will hold hbo for now - reevaluate post angio  Discussed angiogram - he had previous with Dr Romina Ortiz which addressed his left sfa, pop with plasty using 6x250 DCB impact  Concern that tibial disease is the primary issue vs. Previous intervention has shut down  5/25/22  Angio 5/31  Wound stable heel  5/31/22  L SFA, pop atherectomy, plasty with 5x250  L PT plasty 3x80, 2x120  6/22/22   Wound larger but bone covered with granulation tissue  L DP strongly biphasic, PT biphasic, no right groin hematoma  aquacell ag  Culture  Consideration for graft in future  Consideration for hbo in future  6/29/22  Anciboacter bumani light growth - tx with bactrim  Wound dose appear better  7/13/22  Wound smaller  Appearance better  Pt having transportation issues - will transition him back to [facility]  Will also have reassessed for hbo at [facility]  7/25/22  Wound smaller  Appearance better  Cultures taken. X-rays right foot repeated    8/8/22 - X-rays pending. Continue current regimen for now. FU 1 week. HBOT continue    8/15/22 - X-rays show signs of OM. MRI ordered. Continue current regimen for now. FU 1 week. HBOT continue    8/22/22- MRI pending. Continue current regimen for now. FU 1 week.   HBOT continue    8/29/22  Left heel wound measuring slightly smaller   MRI shows signs of osteomyelitis   Wound has cloudy drainage, no foul odor   Cultures obtained   Continue Aquacel ag     Wound/Ulcer Pain Timing/Severity: none  Quality of pain: N/A  Severity:  0 / 10   Modifying Factors: None  Associated Signs/Symptoms: none     Ulcer Identification:  Ulcer Type: arterial and diabetic  Contributing Factors: diabetes     Diabetic/Pressure/Non Pressure Ulcers onl y:        PAST MEDICAL HISTORY      Diagnosis Date    Arthritis     Bilateral carpal tunnel syndrome 2016    Cerebral artery occlusion with cerebral infarction [facility])     Chronic back pain     CVA (cerebral vascular accident) ([address] 11/2015, 2017    Diabetes mellitus ([address]     Type 2    Diabetic foot ulcer with osteomyelitis ([address] 12/21/2021    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 2 diabetes mellitus, with necrosis of bone ([address] 12/21/2021    Hemiparesis affecting left side as late effect of cerebrovascular accident ([address]     LEFT SIDE NON DOMINANT FOLLOWING STROKE    Hyperlipidemia     Hypertension     Peripheral vascular angioplasty status 12/21/2021    Ulcer of left heel, with necrosis of bone ([address] 12/27/2021    Ulcer of left heel, with necrosis of muscle ([address] 12/21/2021    Vitamin D deficiency      Past Surgical History:   Procedure Laterality Date    CARPAL TUNNEL RELEASE  10/01/2016    Dr. Elizabeth Simmons Left 2/16/2021    LEFT FOOT DEBRIDEMENT WITH BONE BIOPSY, WOUND VAC APPLICATION performed by Rober Barksdale DPM at [facility] OR    KNEE ARTHROSCOPY      TONSILLECTOMY  1969    TRANSESOPHAGEAL ECHOCARDIOGRAM N/A 2021    TRANSESOPHAGEAL ECHOCARDIOGRAM WITH BUBBLE STUDY performed by Gianna Sagastume MD at [address] 2021    EGD BIOPSY performed by Kevin Cisse DO at [facility] reviewed. No pertinent family history. Social History     Tobacco Use    Smoking status: Former     Packs/day: 1.00     Years: 25.00     Pack years: 25.00     Types: Cigarettes     Quit date: 2015     Years since quittin.6    Smokeless tobacco: Never   Vaping Use    Vaping Use: Never used   Substance Use Topics    Alcohol use: Not Currently     Comment: Former every day drinker for most of adult life    Drug use: No     Allergies   Allergen Reactions    Pcn [Penicillins] Anaphylaxis     Current Outpatient Medications on File Prior to Encounter   Medication Sig Dispense Refill    cyclobenzaprine (FLEXERIL) 5 MG tablet take 1 tablet by mouth nightly if needed for muscle spasm 30 tablet 2    glimepiride (AMARYL) 2 MG tablet Take 1 tablet by mouth every morning (before breakfast) 30 tablet 2    metFORMIN (GLUCOPHAGE) 500 MG tablet Take 1 tablet by mouth in the morning and 1 tablet in the evening. Take with meals. 60 tablet 3    atorvastatin (LIPITOR) 40 MG tablet Take 1 tablet by mouth nightly 30 tablet 3    ibuprofen (ADVIL;MOTRIN) 200 MG tablet Take 200 mg by mouth every 6 hours as needed for Pain      traMADol (ULTRAM) 50 MG tablet Take 1 tablet by mouth in the morning and 1 tablet in the evening. Do all this for 30 days. 60 tablet 2    gabapentin (NEURONTIN) 100 MG capsule Take 3 capsules by mouth 2 times daily for 30 days.  60 capsule 2    hydroCHLOROthiazide (HYDRODIURIL) 25 MG tablet Take 1 tablet by mouth daily 30 tablet 2    diphenhydrAMINE (BENADRYL) 25 MG capsule Take 25 mg by mouth every 6 hours as needed for Itching      B Complex-C-E-Zn (STRESS B/ZINC) TABS Take 1 tablet by mouth daily      acetaminophen (TYLENOL) 325 MG tablet Take 650 mg by mouth every 6 hours as needed for Pain      ferrous sulfate (IRON 325) 325 (65 Fe) MG tablet Take 325 mg by mouth daily (with breakfast)      vitamin D (ERGOCALCIFEROL) 1.25 MG (81783 UT) CAPS capsule Take 1 capsule by mouth once a week 5 capsule 0    melatonin 3 MG TABS tablet Take 9 mg by mouth nightly as needed      aspirin 81 MG EC tablet Take 81 mg by mouth daily      clopidogrel (PLAVIX) 75 MG tablet Take 1 tablet by mouth daily 30 tablet 3     No current facility-administered medications on file prior to encounter.        REVIEW OF SYSTEMS See HPI    Objective:    BP (!) 140/70   Pulse 85   Temp 97.4 °F (36.3 °C) (Temporal)   Resp 18   Ht 5' 11\" (1.803 m)   Wt 192 lb (87.1 kg)   BMI 26.78 kg/m²   Wt Readings from Last 3 Encounters:   08/29/22 192 lb (87.1 kg)   08/15/22 192 lb (87.1 kg)   08/09/22 192 lb (87.1 kg)     PHYSICAL EXAM  CONSTITUTIONAL:   Awake, alert, cooperative   EYES:  lids and lashes normal   ENT: external ears and nose without lesions   NECK:  supple, symmetrical, trachea midline   SKIN:  Open wound     Assessment:     Problem List Items Addressed This Visit       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 2 diabetes mellitus, with necrosis of bone ([address] (Chronic)    Relevant Orders    Initiate Outpatient Wound Care Protocol    Diabetic foot infection [address] - Primary    Relevant Orders    Initiate Outpatient Wound Care Protocol       Pre Debridement Measurements:  Are located in the [facility]  Documentation Flow Sheet  Post Debridement Measurements:  Wound/Ulcer Descriptions are Pre Debridement except measurements:     Wound 12/26/20 Arm Left (Active)   Number of days: 610       Wound 02/15/21 Heel Left (Active)   Number of days: 560       Wound 02/15/21 Heel Right (Active)   Number of days: 560       Wound 03/08/21 Heel Left #1 left heel aquired 12/1/20 (Active)   Wound Image   08/08/22 1300   Wound Etiology Diabetic 02/14/22 1344   Dressing Status New dressing applied 08/22/22 1535   Wound Cleansed Cleansed with saline 08/22/22 1535   Dressing/Treatment Alginate with Ag;ABD 08/22/22 1535   Offloading for Diabetic Foot Ulcers Post op shoe 08/22/22 1535   Wound Length (cm) 4.8 cm 08/29/22 1332   Wound Width (cm) 3.2 cm 08/29/22 1332   Wound Depth (cm) 1.2 cm 08/29/22 1332   Wound Surface Area (cm^2) 15.36 cm^2 08/29/22 1332   Change in Wound Size % (l*w) -27.05 08/29/22 1332   Wound Volume (cm^3) 18.432 cm^3 08/29/22 1332   Wound Healing % -118 08/29/22 1332   Post-Procedure Length (cm) 4.9 cm 08/29/22 1412   Post-Procedure Width (cm) 3.3 cm 08/29/22 1412   Post-Procedure Depth (cm) 1.3 cm 08/29/22 1412   Post-Procedure Surface Area (cm^2) 16.17 cm^2 08/29/22 1412   Post-Procedure Volume (cm^3) 21. 021 cm^3 08/29/22 1412   Undermining Starts ___ O'Clock 9 05/25/22 1329   Undermining Ends___ O'Clock 3 05/25/22 1329   Undermining Maxium Distance (cm) [email or web address] 05/25/22 1329   Wound Assessment Fibrin;Pink/red 08/29/22 1332   Drainage Amount Large 08/29/22 1332   Drainage Description Brown;Yellow 08/29/22 1332   Odor None 08/29/22 1332   Adrianne-wound Assessment Maceration 08/29/22 1332   Number of days: 539       Wound 03/08/21 Heel Right #2 rt heel aquired 12/1/20 (Active)   Wound Image   04/05/21 0929   Dressing Status New dressing applied 03/18/21 1352   Wound Cleansed Cleansed with saline 04/01/21 1536   Dressing/Treatment Alginate;Collagen;Silicone border 39/54/94 1536   Offloading for Diabetic Foot Ulcers Post op shoe 04/01/21 1536   Wound Length (cm) 0 cm 04/05/21 0929   Wound Width (cm) 0 cm 04/05/21 0929   Wound Depth (cm) 0 cm 04/05/21 0929   Wound Surface Area (cm^2) 0 cm^2 04/05/21 0929   Change in Wound Size % (l*w) 100 04/05/21 0929   Wound Volume (cm^3) 0 cm^3 04/05/21 0929   Wound Healing % 100 04/05/21 0929   Post-Procedure Length (cm) 0 cm 04/05/21 1011   Post-Procedure Width (cm) 0 cm 04/05/21 1011 Post-Procedure Depth (cm) 0 cm 04/05/21 1011   Post-Procedure Surface Area (cm^2) 0 cm^2 04/05/21 1011   Post-Procedure Volume (cm^3) 0 cm^3 04/05/21 1011   Wound Assessment Fibrin 04/01/21 1357   Drainage Amount None 04/01/21 1357   Drainage Description Yellow 03/22/21 0911   Odor None 04/01/21 1357   Adrianne-wound Assessment Maceration 04/01/21 1357   Number of days: 539     Incision 02/19/21 Groin Right (Active)   Number of days: 556       Procedure Note  Indications:  Based on my examination of this patient's wound(s)/ulcer(s) today, debridement is required to promote healing and evaluate the wound base. Performed by: ALBERT Ortiz CNP    Consent obtained:  Yes    Time out taken:  Yes    Pain Control: Anesthetic  Anesthetic: 4% Lidocaine Liquid Topical     Debridement:Excisional Debridement    Using curette the wound(s)/ulcer(s) was/were sharply debrided down through and including the removal of epidermis, dermis and subcutaneous tissue. Devitalized Tissue Debrided:  fibrin, biofilm, slough and exudate to stimulate bleeding to promote healing, post debridement good bleeding base and wound edges noted    Wound/Ulcer #: 1    Percent of Wound/Ulcer Debrided: 100%    Total Surface Area Debrided:  16.17  sq cm     Estimated Blood Loss:  Minimal  Hemostasis Achieved:  by pressure    Procedural Pain:  4  / 10   Post Procedural Pain:  2 / 10     Response to treatment:  Well tolerated by patient. Plan:   Treatment Note please see attached Discharge Instructions    Written patient dismissal instructions given to patient and signed by patient or POA.          Discharge Instructions         Visit Discharge/Physician Orders     Assessment of pain at discharge: moderate     Anesthetic used: 4% liquid lidocaine solution     Discharge to:     Left via:ambulance     Accompanied by: self     ECF/HHA: [address]     Dressing Orders:  Cleanse left heel ulcer with normal saline solution, apply

## 2022-09-01 LAB
ANAEROBIC CULTURE: NORMAL
GRAM STAIN RESULT: ABNORMAL
ORGANISM: ABNORMAL
WOUND/ABSCESS: ABNORMAL

## 2022-09-08 NOTE — DISCHARGE INSTRUCTIONS
Visit Discharge/Physician Orders     Assessment of pain at discharge: moderate     Anesthetic used: 4% liquid lidocaine solution     Discharge to:     Left via:ambulance     Accompanied by: self     [facility]: [facility]     Dressing Orders:  Cleanse left heel ulcer with normal saline solution, apply alginate Ag and dry dressing and change every day. Treatment Orders:   Wear prevalon boots or heel ayo while in bed. May be partial weight bearing up to 50% on left with surgical shoe. MRI of left foot-reviewed. Dr Andreia Strong discussed surgery  Culture taken 8/29/22 - reviewed. [facility] followup visit: __ 1 week Dr Andreia Strong ____________  (Please note your next appointment above and if you are unable to keep, kindly give a 24 hour notice. Thank you.)     Physician signature:__________________________        If you experience any of the following, please call the [facility] during business hours:     * Increase in Pain  * Temperature over 101  * Increase in drainage from your wound  * Drainage with a foul odor  * Bleeding  * Increase in swelling  * Need for compression bandage changes due to slippage, breakthrough drainage. If you need medical attention outside of the business hours of the [facility] please contact your PCP or go to the nearest emergency room.

## 2022-09-12 ENCOUNTER — HOSPITAL ENCOUNTER (OUTPATIENT)
Dept: WOUND CARE | Age: 62
Discharge: HOME | DRG: 854 | End: 2022-09-12
Payer: COMMERCIAL

## 2022-09-12 VITALS
HEIGHT: 71 IN | WEIGHT: 192 LBS | SYSTOLIC BLOOD PRESSURE: 122 MMHG | BODY MASS INDEX: 26.88 KG/M2 | TEMPERATURE: 99 F | HEART RATE: 96 BPM | RESPIRATION RATE: 18 BRPM | DIASTOLIC BLOOD PRESSURE: 82 MMHG

## 2022-09-12 DIAGNOSIS — E11.621 DIABETIC ULCER OF LEFT HEEL ASSOCIATED WITH TYPE 2 DIABETES MELLITUS, WITH NECROSIS OF BONE (HCC): ICD-10-CM

## 2022-09-12 DIAGNOSIS — E11.628 DIABETIC FOOT INFECTION (HCC): Primary | ICD-10-CM

## 2022-09-12 DIAGNOSIS — L08.9 DIABETIC FOOT INFECTION (HCC): Primary | ICD-10-CM

## 2022-09-12 DIAGNOSIS — L97.424 DIABETIC ULCER OF LEFT HEEL ASSOCIATED WITH TYPE 2 DIABETES MELLITUS, WITH NECROSIS OF BONE (HCC): ICD-10-CM

## 2022-09-12 PROCEDURE — 11042 DBRDMT SUBQ TIS 1ST 20SQCM/<: CPT

## 2022-09-12 RX ORDER — GINSENG 100 MG
CAPSULE ORAL ONCE
Status: CANCELLED | OUTPATIENT
Start: 2022-09-12 | End: 2022-09-12

## 2022-09-12 RX ORDER — LIDOCAINE HYDROCHLORIDE 40 MG/ML
SOLUTION TOPICAL ONCE
Status: CANCELLED | OUTPATIENT
Start: 2022-09-12 | End: 2022-09-12

## 2022-09-12 RX ORDER — CLOBETASOL PROPIONATE 0.5 MG/G
OINTMENT TOPICAL ONCE
Status: CANCELLED | OUTPATIENT
Start: 2022-09-12 | End: 2022-09-12

## 2022-09-12 RX ORDER — LIDOCAINE HYDROCHLORIDE 20 MG/ML
JELLY TOPICAL ONCE
Status: CANCELLED | OUTPATIENT
Start: 2022-09-12 | End: 2022-09-12

## 2022-09-12 RX ORDER — BETAMETHASONE DIPROPIONATE 0.05 %
OINTMENT (GRAM) TOPICAL ONCE
Status: CANCELLED | OUTPATIENT
Start: 2022-09-12 | End: 2022-09-12

## 2022-09-12 RX ORDER — BACITRACIN ZINC AND POLYMYXIN B SULFATE 500; 1000 [USP'U]/G; [USP'U]/G
OINTMENT TOPICAL ONCE
Status: CANCELLED | OUTPATIENT
Start: 2022-09-12 | End: 2022-09-12

## 2022-09-12 RX ORDER — LIDOCAINE 50 MG/G
OINTMENT TOPICAL ONCE
Status: CANCELLED | OUTPATIENT
Start: 2022-09-12 | End: 2022-09-12

## 2022-09-12 RX ORDER — LIDOCAINE 40 MG/G
CREAM TOPICAL ONCE
Status: CANCELLED | OUTPATIENT
Start: 2022-09-12 | End: 2022-09-12

## 2022-09-12 RX ORDER — BACITRACIN, NEOMYCIN, POLYMYXIN B 400; 3.5; 5 [USP'U]/G; MG/G; [USP'U]/G
OINTMENT TOPICAL ONCE
Status: CANCELLED | OUTPATIENT
Start: 2022-09-12 | End: 2022-09-12

## 2022-09-12 RX ORDER — LIDOCAINE HYDROCHLORIDE 40 MG/ML
SOLUTION TOPICAL ONCE
Status: COMPLETED | OUTPATIENT
Start: 2022-09-12 | End: 2022-09-12

## 2022-09-12 RX ORDER — GENTAMICIN SULFATE 1 MG/G
OINTMENT TOPICAL ONCE
Status: CANCELLED | OUTPATIENT
Start: 2022-09-12 | End: 2022-09-12

## 2022-09-12 RX ADMIN — LIDOCAINE HYDROCHLORIDE 10 ML: 40 SOLUTION TOPICAL at 13:41

## 2022-09-12 ASSESSMENT — PAIN DESCRIPTION - LOCATION: LOCATION: FOOT

## 2022-09-12 ASSESSMENT — PAIN - FUNCTIONAL ASSESSMENT: PAIN_FUNCTIONAL_ASSESSMENT: PREVENTS OR INTERFERES SOME ACTIVE ACTIVITIES AND ADLS

## 2022-09-12 ASSESSMENT — PAIN DESCRIPTION - FREQUENCY: FREQUENCY: INTERMITTENT

## 2022-09-12 ASSESSMENT — PAIN DESCRIPTION - ONSET: ONSET: ON-GOING

## 2022-09-12 ASSESSMENT — PAIN SCALES - GENERAL: PAINLEVEL_OUTOF10: 3

## 2022-09-12 ASSESSMENT — PAIN DESCRIPTION - ORIENTATION: ORIENTATION: LEFT

## 2022-09-12 ASSESSMENT — PAIN DESCRIPTION - PAIN TYPE: TYPE: CHRONIC PAIN

## 2022-09-12 ASSESSMENT — PAIN DESCRIPTION - DESCRIPTORS: DESCRIPTORS: DULL

## 2022-09-12 NOTE — PROGRESS NOTES
Wound Healing Center Followup Visit Note    Referring Physician : Sharmaine Calles MD  [address] [facility] RECORD NUMBER:  [id number]  AGE: 60 y.o. GENDER: male  : 1960  EPISODE DATE:  2022    Subjective:     Chief Complaint   Patient presents with    Wound Check     Left foot        HISTORY of PRESENT ILLNESS HPI   Germaine Crowley is a 60 y.o. male who presents today in regards to follow up evaluation and treatment of wound/ulcer. That patient's past medical, family and social hx were reviewed and changes were made if present. History of Wound Context:  The patient has had a wound of bilateral heels which was first noted approximately over a month ago. This has been treated with surgical debridement. On their initial visit to the wound healing center, 21 ,  the patient has noted that the wound has not been improving. The patient has had similar previous wounds in the past.       Pt is not on abx at time of initial visit. 3/22/21 - Wound VAC MWF continue to left heel. Aquacel Ag to right heel. Debrided toenails 1-5 in thickness and length. FU 1 week. IV Abx per Dr. Rosi Fields. 21 - Wound VAC MWF to left heel. Cultures obtained. FU 1 week after visit with Dr. Rosi Fields. PO Abx.  21 - DC wound VAC to left heel. Alginate to wound. FU 1 week  21 - Alginate and gentamicin ointment QOD. Partial WB to heel at 50% to foot with surgical shoe and walker. 5/3/21 - Alginate and gentamicin ointment QOD. MRI left heel     5/10/21 - Alginate and gentamicin ointment QOD. MRI left heel pending. 21 - Alginate and gentamicin ointment QOD. MRI reviewed confirming OM. Discussed HBOT referral and partial calcanectomy. He opts for HBOT consultation. 21 - Alginate and add gentamicin ointment QD. HBOT referral.  Patient hold off on partial calcanectomy. FU 1 week     21 - Alginate and add gentamicin ointment QD.   HBOT referral.  Patient hold off on partial calcanectomy. FU 1 week     6/28/21 - Alginate and add gentamicin ointment QD. HBOT referral.  Patient hold off on partial calcanectomy. FU 1 week. Patient gave me consent (verbal) to speak with his brother Pacheco Shaffer regarding his at home arrangement. HBOT is on hold until patient is at home. 7/12/21 -  Alginate and add gentamicin ointment QD. HBOT referral.  Patient considering partial calcanectomy with flap. FU 1 week. 7/19/21 - Alginate and add gentamicin ointment QD. HBOT referral.  Patient considering partial calcanectomy with flap. FU 1 week. 8/2/21 - Alginate and add gentamicin ointment QD. HBOT referral.  Patient considering partial calcanectomy with flap. FU 1 week. 8/16/21 - Alginate and add gentamicin ointment QD. HBOT referral.  Patient considering partial calcanectomy with flap. FU 1 week. 8/23/21 - Alginate and add gentamicin ointment QD. HBOT referral.  Patient considering partial calcanectomy with flap. FU 1 week. 8/30/21 - Alginate and add gentamicin ointment QD. HBOT referral.  Patient considering partial calcanectomy with flap. FU 1 week. 9/20/21 - Alginate and add gentamicin ointment QD. HBOT referral.  Patient considering partial calcanectomy with flap. FU 1 week. 10/25/21 Alginate and add gentamicin ointment QD. HBOT referral.  Patient considering partial calcanectomy with flap. FU 1 week. 11/1/21 Alginate and add gentamicin ointment QD. HBOT referral.  Patient considering partial calcanectomy with flap. FU 1 week. 11/8/21 - Alginate and add gentamicin ointment QD. HBOT referral.  Patient considering partial calcanectomy with flap. FU 1 week. 11/15/21 - Alginate and add gentamicin ointment QD. HBOT referral.  Patient considering partial calcanectomy with flap. FU 1 week. 11/22/21 -  Alginate and add gentamicin ointment QD. HBOT referral.  Patient considering partial calcanectomy with flap. FU 1 week. 11/29/21 0 Alginate and gentamicin ointment. Possible DC home. HBOT referral.  Patient considering partial calcanectomy with flap. FU 1 week. 12/6/21 - Alginate and gentamicin ointment. Possible DC home. HBOT referral.  Patient considering partial calcanectomy with flap. FU 1 week. 12/13/21 - Alginate and gentamicin ointment. DC home. HBOT referral.  [facility] referral for wound care and PT for GAIT training and safe DME use. Patient considering partial calcanectomy with flap. FU 1 week. 12/20/21 -  Alginate and gentamicin ointment. DC home. HBOT referral.     12/27/21 - HBOT consultation complete - to start HBOT after new year. 2-14-22 patient presents today for evaluation of a left heel wound. Patient transferred here to [facility] due to the fact that he is doing HBO, for convenience due to transportation. He was prior following with Dr. Devyn Greer. Patient overall doing well, no complaints. Tolerating dressings as well as hyperbarics. Physical exam demonstrates vascular intact. Wound appreciated posterior aspect with areas of devitalized nonviable tissue with beefy tissue noted as well. There is no purulence, odor, erythema or increase in temperature. Currently no exposed bone. 2-24-22  Patient overall doing well, no complaints. Tolerating dressings as well as hyperbarics. Physical exam demonstrates vascular intact. Wound appreciated posterior aspect with areas of devitalized nonviable tissue with beefy tissue noted as well. There is no purulence, odor, erythema or increase in temperature. 3-3-22 presents in follow-up. Relates dark area on his left great toenail as well as \"swelling\"  Left lower leg that he noticed recently. Patient denies any known trauma. Patient currently takes aspirin as well as Plavix. Physical exam demonstrates vascular intact.   Wound appreciated posterior aspect with areas of devitalized nonviable tissue with beefy tissue noted as well. There is no purulence, odor, erythema or increase in temperature. Left great toenail proximal lateral small area, subungual hematoma, stable. 2 fluctuant areas adjacent left lower leg anterior lateral aspect. There is no erythema or increase in temperature. After oral consent under sterile technique and utilizing 1% lidocaine plain, 21-gauge needle as well as 11 blade stab incision expressing approximately 20 cc of hematoma formation, this was cultured. Monitor closely if he notices any recurrence recommend [facility] for further evaluation    3-28-22 last seen March 3rd, transportation issues. Physical exam, vascular intact. Wound appreciated posterior aspect left heel with areas of devitalized nonviable tissue, increasing/seen. There is no purulence, odor, erythema or increase in temperature. Change treatment to Santyl. Patient would benefit from continued HBO.    4-4-22 Physical exam, vascular intact. Wound appreciated posterior aspect left heel with areas of devitalized nonviable tissue w/ areas of beefy tissue. There is no purulence, odor, erythema or increase in temperature. Santyl. HBO. Improved at present. 4-11-22 Wound appreciated posterior aspect left heel with areas of devitalized nonviable tissue w/ areas of beefy tissue. There is no purulence, odor, erythema or increase in temperature. Santyl. HBO. 4-18-22 Wound appreciated posterior aspect left heel with areas of devitalized nonviable tissue w/ areas of beefy tissue. There is no purulence, odor, erythema or increase in temperature. Santyl. HBO. Discussed possible graft in near future, will get updated vasc studies.     5/9/2022  Patient did not come last 2 weeks and also did not go for hyperbaric, last week, because of transportation problems  Patient tells me at one time his podiatrist Katelyn Dailey informed him that he may consider skin grafting of the wound  On today's examination, I can literally feel the bone, but looks fairly clean  Patient is trying to continue offloading the heel ulcer, and also continuing the hyperbaric oxygen therapy treatments  The last lower extremity artery Doppler study done in 2021 revealed almost triphasic right ankle Doppler tracings and biphasic left ankle Doppler tracings with adequate flow to the heel and foot for potential tissue healing  Patient was rescheduled for repeat proximal artery Doppler study by his podiatrist, not done yet I do not see it in the epic  Inform the patient that last week I discussed with Dr. Dianna Curry regarding continuing the hyperbaric oxygen therapy, possibility of skin grafting  Discussed with patient and nursing staff, please schedule appointment for the patient to see Dr. Dianna Curry for his input pending return to have his podiatrist back to the wound care center for further management   we will make additional recommendations once the lower extremity artery Doppler studies completed  All his questions were answered  5/18/22  Concern that arterial flow is not adequate based off exam today  Left dp weakly biphasic, PT monophasic  Will hold hbo for now - reevaluate post angio  Discussed angiogram - he had previous with Dr Alice Vela which addressed his left sfa, pop with plasty using 6x250 DCB impact  Concern that tibial disease is the primary issue vs. Previous intervention has shut down  5/25/22  Angio 5/31  Wound stable heel  5/31/22  L SFA, pop atherectomy, plasty with 5x250  L PT plasty 3x80, 2x120  6/22/22   Wound larger but bone covered with granulation tissue  L DP strongly biphasic, PT biphasic, no right groin hematoma  aquacell ag  Culture  Consideration for graft in future  Consideration for hbo in future  6/29/22  Anciboacter bumani light growth - tx with bactrim  Wound dose appear better  7/13/22  Wound smaller  Appearance better  Pt having transportation issues - will transition him back to [facility] at [facility]  Will also have reassessed for hbo at [facility]  7/25/22  Wound smaller  Appearance better  Cultures taken. X-rays right foot repeated    8/8/22 - X-rays pending. Continue current regimen for now. FU 1 week. HBOT continue    8/15/22 - X-rays show signs of OM. MRI ordered. Continue current regimen for now. FU 1 week. HBOT continue    8/22/22- MRI pending. Continue current regimen for now. FU 1 week. HBOT continue    9/12/22 - MRI confirmed OM. Recommend OR debridement going forward. Patient will think about this and let us know next week.     Wound/Ulcer Pain Timing/Severity: none  Quality of pain: N/A  Severity:  0 / 10   Modifying Factors: None  Associated Signs/Symptoms: none     Ulcer Identification:  Ulcer Type: arterial and diabetic  Contributing Factors: diabetes     Diabetic/Pressure/Non Pressure Ulcers onl y:        PAST MEDICAL HISTORY      Diagnosis Date    Arthritis     Bilateral carpal tunnel syndrome 2016    Cerebral artery occlusion with cerebral infarction [facility])     Chronic back pain     CVA (cerebral vascular accident) ([address] 11/2015, 2017    Diabetes mellitus ([address]     Type 2    Diabetic foot ulcer with osteomyelitis ([address] 12/21/2021    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 2 diabetes mellitus, with necrosis of bone ([address] 12/21/2021    Hemiparesis affecting left side as late effect of cerebrovascular accident ([address]     LEFT SIDE NON DOMINANT FOLLOWING STROKE    Hyperlipidemia     Hypertension     Peripheral vascular angioplasty status 12/21/2021    Ulcer of left heel, with necrosis of bone ([address] 12/27/2021    Ulcer of left heel, with necrosis of muscle ([address] 12/21/2021    Vitamin D deficiency      Past Surgical History:   Procedure Laterality Date    CARPAL TUNNEL RELEASE  10/01/2016    Dr. Carmen Strong Left 2/16/2021    LEFT FOOT DEBRIDEMENT WITH BONE BIOPSY, WOUND VAC APPLICATION performed by Lovely Novak DPM at [address] [employer] ARTHROSCOPY TONSILLECTOMY  1969    TRANSESOPHAGEAL ECHOCARDIOGRAM N/A 2021    TRANSESOPHAGEAL ECHOCARDIOGRAM WITH BUBBLE STUDY performed by Brett Mccollum MD at [address] 2021    EGD BIOPSY performed by Xavier Stallworth DO at [facility] 23 reviewed. No pertinent family history. Social History     Tobacco Use    Smoking status: Former     Packs/day: 1.00     Years: 25.00     Pack years: 25.00     Types: Cigarettes     Quit date: 2015     Years since quittin.7    Smokeless tobacco: Never   Vaping Use    Vaping Use: Never used   Substance Use Topics    Alcohol use: Not Currently     Comment: Former every day drinker for most of adult life    Drug use: No     Allergies   Allergen Reactions    Pcn [Penicillins] Anaphylaxis     Current Outpatient Medications on File Prior to Encounter   Medication Sig Dispense Refill    cyclobenzaprine (FLEXERIL) 5 MG tablet take 1 tablet by mouth nightly if needed for muscle spasm 30 tablet 2    glimepiride (AMARYL) 2 MG tablet Take 1 tablet by mouth every morning (before breakfast) 30 tablet 2    metFORMIN (GLUCOPHAGE) 500 MG tablet Take 1 tablet by mouth in the morning and 1 tablet in the evening. Take with meals. 60 tablet 3    atorvastatin (LIPITOR) 40 MG tablet Take 1 tablet by mouth nightly 30 tablet 3    ibuprofen (ADVIL;MOTRIN) 200 MG tablet Take 200 mg by mouth every 6 hours as needed for Pain      gabapentin (NEURONTIN) 100 MG capsule Take 3 capsules by mouth 2 times daily for 30 days.  60 capsule 2    hydroCHLOROthiazide (HYDRODIURIL) 25 MG tablet Take 1 tablet by mouth daily 30 tablet 2    diphenhydrAMINE (BENADRYL) 25 MG capsule Take 25 mg by mouth every 6 hours as needed for Itching      B Complex-C-E-Zn (STRESS B/ZINC) TABS Take 1 tablet by mouth daily      acetaminophen (TYLENOL) 325 MG tablet Take 650 mg by mouth every 6 hours as needed for Pain      ferrous sulfate (IRON 325) 325 (65 Fe) MG tablet Take 08/22/22 1535   Offloading for Diabetic Foot Ulcers Post op shoe 08/22/22 1535   Wound Length (cm) 5 cm 09/12/22 1331   Wound Width (cm) 3.1 cm 09/12/22 1331   Wound Depth (cm) 1.1 cm 09/12/22 1331   Wound Surface Area (cm^2) 15.5 cm^2 09/12/22 1331   Change in Wound Size % (l*w) -28.21 09/12/22 1331   Wound Volume (cm^3) 17.05 cm^3 09/12/22 1331   Wound Healing % -102 09/12/22 1331   Post-Procedure Length (cm) 5.1 cm 09/12/22 1402   Post-Procedure Width (cm) 3.2 cm 09/12/22 1402   Post-Procedure Depth (cm) 1.2 cm 09/12/22 1402   Post-Procedure Surface Area (cm^2) 16.32 cm^2 09/12/22 1402   Post-Procedure Volume (cm^3) 19.584 cm^3 09/12/22 1402   Undermining Starts ___ O'Clock 9 05/25/22 1329   Undermining Ends___ O'Clock 3 05/25/22 1329   Undermining Maxium Distance (cm) [email or web address] 05/25/22 1329   Wound Assessment Pink/red;Fibrin 09/12/22 1331   Drainage Amount Large 09/12/22 1331   Drainage Description Sanguinous 09/12/22 1331   Odor None 09/12/22 1331   Adrianne-wound Assessment Maceration 09/12/22 1331   Number of days: 553       Wound 03/08/21 Heel Right #2 rt heel aquired 12/1/20 (Active)   Wound Image   04/05/21 0929   Dressing Status New dressing applied 03/18/21 1352   Wound Cleansed Cleansed with saline 04/01/21 1536   Dressing/Treatment Alginate;Collagen;Silicone border 66/22/18 1536   Offloading for Diabetic Foot Ulcers Post op shoe 04/01/21 1536   Wound Length (cm) 0 cm 04/05/21 0929   Wound Width (cm) 0 cm 04/05/21 0929   Wound Depth (cm) 0 cm 04/05/21 0929   Wound Surface Area (cm^2) 0 cm^2 04/05/21 0929   Change in Wound Size % (l*w) 100 04/05/21 0929   Wound Volume (cm^3) 0 cm^3 04/05/21 0929   Wound Healing % 100 04/05/21 0929   Post-Procedure Length (cm) 0 cm 04/05/21 1011   Post-Procedure Width (cm) 0 cm 04/05/21 1011   Post-Procedure Depth (cm) 0 cm 04/05/21 1011   Post-Procedure Surface Area (cm^2) 0 cm^2 04/05/21 1011   Post-Procedure Volume (cm^3) 0 cm^3 04/05/21 1011   Wound Assessment Fibrin 04/01/21 1357   Drainage Amount None 04/01/21 1357   Drainage Description Yellow 03/22/21 0911   Odor None 04/01/21 1357   Adrianne-wound Assessment Maceration 04/01/21 1357   Number of days: 553     Incision 02/19/21 Groin Right (Active)   Number of days: 570       Procedure Note  Indications:  Based on my examination of this patient's wound(s)/ulcer(s) today, debridement is required to promote healing and evaluate the wound base. Performed by: Vanessa Noguera DPM    Consent obtained:  Yes    Time out taken:  Yes    Pain Control: Anesthetic  Anesthetic: 4% Lidocaine Liquid Topical     Debridement:Excisional Debridement    Using curette the wound(s)/ulcer(s) was/were sharply debrided down through and including the removal of epidermis, dermis and subcutaneous tissue. Devitalized Tissue Debrided:  fibrin, biofilm, slough and exudate to stimulate bleeding to promote healing, post debridement good bleeding base and wound edges noted    Wound/Ulcer #: 1    Percent of Wound/Ulcer Debrided: 100%    Total Surface Area Debrided:  15.5 sq cm     Estimated Blood Loss:  Minimal  Hemostasis Achieved:  by pressure    Procedural Pain:  4  / 10   Post Procedural Pain:  2 / 10     Response to treatment:  Well tolerated by patient. Plan:   Treatment Note please see attached Discharge Instructions    Written patient dismissal instructions given to patient and signed by patient or POA. Discharge Instructions            Visit Discharge/Physician Orders     Assessment of pain at discharge: moderate     Anesthetic used: 4% liquid lidocaine solution     Discharge to:     Left via:ambulance     Accompanied by: self     ECF/HHA: [address]     Dressing Orders:  Cleanse left heel ulcer with normal saline solution, apply alginate Ag and dry dressing and change every day. Treatment Orders:   Wear prevalon boots or heel ayo while in bed.        May be partial weight bearing up to 50% on left with surgical shoe.  MRI of left foot-reviewed. Dr Cm Pham discussed surgery  Culture taken 8/29/22 - reviewed. [facility] followup visit: __ 1 week Dr Cm Pham ____________  (Please note your next appointment above and if you are unable to keep, kindly give a 24 hour notice. Thank you.)     Physician signature:__________________________        If you experience any of the following, please call the [facility] during business hours:     * Increase in Pain  * Temperature over 101  * Increase in drainage from your wound  * Drainage with a foul odor  * Bleeding  * Increase in swelling  * Need for compression bandage changes due to slippage, breakthrough drainage. If you need medical attention outside of the business hours of the [facility] please contact your PCP or go to the nearest emergency room.                                                                                                        Electronically signed by Kristian Figueroa DPM on 9/12/2022 at 2:09 PM

## 2022-09-12 NOTE — PLAN OF CARE
Problem: Chronic Conditions and Co-morbidities  Goal: Patient's chronic conditions and co-morbidity symptoms are monitored and maintained or improved  Outcome: Progressing     Problem: Chronic Conditions and Co-morbidities  Goal: Patient's chronic conditions and co-morbidity symptoms are monitored and maintained or improved  Outcome: Progressing     Problem: Pain  Goal: Verbalizes/displays adequate comfort level or baseline comfort level  Outcome: Progressing     Problem: Pain  Goal: Verbalizes/displays adequate comfort level or baseline comfort level  Outcome: Progressing     Problem: Wound:  Goal: Will show signs of wound healing; wound closure and no evidence of infection  Description: Will show signs of wound healing; wound closure and no evidence of infection  Outcome: Progressing

## 2022-09-13 ENCOUNTER — APPOINTMENT (OUTPATIENT)
Dept: GENERAL RADIOLOGY | Age: 62
DRG: 854 | End: 2022-09-13
Payer: COMMERCIAL

## 2022-09-13 ENCOUNTER — HOSPITAL ENCOUNTER (INPATIENT)
Age: 62
LOS: 13 days | Discharge: OTHER INSTITUTION | DRG: 854 | End: 2022-09-26
Attending: STUDENT IN AN ORGANIZED HEALTH CARE EDUCATION/TRAINING PROGRAM | Admitting: INTERNAL MEDICINE
Payer: COMMERCIAL

## 2022-09-13 DIAGNOSIS — M86.072 ACUTE HEMATOGENOUS OSTEOMYELITIS OF LEFT FOOT (HCC): ICD-10-CM

## 2022-09-13 DIAGNOSIS — L03.90 WOUND CELLULITIS: ICD-10-CM

## 2022-09-13 DIAGNOSIS — M86.172 ACUTE OSTEOMYELITIS OF LEFT FOOT (HCC): ICD-10-CM

## 2022-09-13 DIAGNOSIS — A41.9 SEPTICEMIA (HCC): Primary | ICD-10-CM

## 2022-09-13 DIAGNOSIS — E87.20 LACTIC ACIDOSIS: ICD-10-CM

## 2022-09-13 PROBLEM — L08.9 WOUND INFECTION: Status: ACTIVE | Noted: 2022-09-13

## 2022-09-13 PROBLEM — T14.8XXA WOUND INFECTION: Status: ACTIVE | Noted: 2022-09-13

## 2022-09-13 LAB
ALBUMIN SERPL-MCNC: 3.6 G/DL (ref 3.5–5.2)
ALP BLD-CCNC: 100 U/L (ref 40–129)
ALT SERPL-CCNC: 15 U/L (ref 0–40)
ANION GAP SERPL CALCULATED.3IONS-SCNC: 16 MMOL/L (ref 7–16)
AST SERPL-CCNC: 23 U/L (ref 0–39)
BACTERIA: ABNORMAL /HPF
BASOPHILS ABSOLUTE: 0 E9/L (ref 0–0.2)
BASOPHILS RELATIVE PERCENT: 0.3 % (ref 0–2)
BILIRUB SERPL-MCNC: 1 MG/DL (ref 0–1.2)
BILIRUBIN URINE: NEGATIVE
BLOOD, URINE: ABNORMAL
BUN BLDV-MCNC: 13 MG/DL (ref 6–23)
BURR CELLS: ABNORMAL
C-REACTIVE PROTEIN: 24.9 MG/DL (ref 0–0.4)
CALCIUM SERPL-MCNC: 9.4 MG/DL (ref 8.6–10.2)
CHLORIDE BLD-SCNC: 87 MMOL/L (ref 98–107)
CLARITY: CLEAR
CO2: 23 MMOL/L (ref 22–29)
COLOR: YELLOW
CREAT SERPL-MCNC: 1 MG/DL (ref 0.7–1.2)
EOSINOPHILS ABSOLUTE: 0 E9/L (ref 0.05–0.5)
EOSINOPHILS RELATIVE PERCENT: 0.2 % (ref 0–6)
EPITHELIAL CELLS, UA: ABNORMAL /HPF
GFR AFRICAN AMERICAN: >60
GFR NON-AFRICAN AMERICAN: >60 ML/MIN/1.73
GLUCOSE BLD-MCNC: 194 MG/DL (ref 74–99)
GLUCOSE URINE: NEGATIVE MG/DL
HCT VFR BLD CALC: 35.3 % (ref 37–54)
HEMOGLOBIN: 12 G/DL (ref 12.5–16.5)
KETONES, URINE: NEGATIVE MG/DL
LACTIC ACID: 4.1 MMOL/L (ref 0.5–2.2)
LACTIC ACID: 4.3 MMOL/L (ref 0.5–2.2)
LEUKOCYTE ESTERASE, URINE: NEGATIVE
LYMPHOCYTES ABSOLUTE: 0.67 E9/L (ref 1.5–4)
LYMPHOCYTES RELATIVE PERCENT: 1.7 % (ref 20–42)
MCH RBC QN AUTO: 29.6 PG (ref 26–35)
MCHC RBC AUTO-ENTMCNC: 34 % (ref 32–34.5)
MCV RBC AUTO: 86.9 FL (ref 80–99.9)
MONOCYTES ABSOLUTE: 0.34 E9/L (ref 0.1–0.95)
MONOCYTES RELATIVE PERCENT: 0.9 % (ref 2–12)
NEUTROPHILS ABSOLUTE: 32.59 E9/L (ref 1.8–7.3)
NEUTROPHILS RELATIVE PERCENT: 97.4 % (ref 43–80)
NITRITE, URINE: NEGATIVE
PDW BLD-RTO: 13.3 FL (ref 11.5–15)
PH UA: 6 (ref 5–9)
PLATELET # BLD: 456 E9/L (ref 130–450)
PMV BLD AUTO: 8.7 FL (ref 7–12)
POIKILOCYTES: ABNORMAL
POTASSIUM REFLEX MAGNESIUM: 3.8 MMOL/L (ref 3.5–5)
PROTEIN UA: 100 MG/DL
RBC # BLD: 4.06 E12/L (ref 3.8–5.8)
RBC UA: ABNORMAL /HPF (ref 0–2)
SEDIMENTATION RATE, ERYTHROCYTE: 90 MM/HR (ref 0–15)
SODIUM BLD-SCNC: 126 MMOL/L (ref 132–146)
SPECIFIC GRAVITY UA: 1.02 (ref 1–1.03)
TOTAL PROTEIN: 8.5 G/DL (ref 6.4–8.3)
TROPONIN, HIGH SENSITIVITY: 48 NG/L (ref 0–11)
TROPONIN, HIGH SENSITIVITY: 48 NG/L (ref 0–11)
UROBILINOGEN, URINE: 1 E.U./DL
WBC # BLD: 33.6 E9/L (ref 4.5–11.5)
WBC UA: ABNORMAL /HPF (ref 0–5)

## 2022-09-13 PROCEDURE — 84484 ASSAY OF TROPONIN QUANT: CPT

## 2022-09-13 PROCEDURE — 87040 BLOOD CULTURE FOR BACTERIA: CPT

## 2022-09-13 PROCEDURE — 87150 DNA/RNA AMPLIFIED PROBE: CPT

## 2022-09-13 PROCEDURE — 96366 THER/PROPH/DIAG IV INF ADDON: CPT

## 2022-09-13 PROCEDURE — 80053 COMPREHEN METABOLIC PANEL: CPT

## 2022-09-13 PROCEDURE — 71045 X-RAY EXAM CHEST 1 VIEW: CPT

## 2022-09-13 PROCEDURE — 85025 COMPLETE CBC W/AUTO DIFF WBC: CPT

## 2022-09-13 PROCEDURE — 93005 ELECTROCARDIOGRAM TRACING: CPT | Performed by: STUDENT IN AN ORGANIZED HEALTH CARE EDUCATION/TRAINING PROGRAM

## 2022-09-13 PROCEDURE — 99223 1ST HOSP IP/OBS HIGH 75: CPT | Performed by: INTERNAL MEDICINE

## 2022-09-13 PROCEDURE — 99285 EMERGENCY DEPT VISIT HI MDM: CPT

## 2022-09-13 PROCEDURE — 87186 SC STD MICRODIL/AGAR DIL: CPT

## 2022-09-13 PROCEDURE — 2580000003 HC RX 258: Performed by: STUDENT IN AN ORGANIZED HEALTH CARE EDUCATION/TRAINING PROGRAM

## 2022-09-13 PROCEDURE — 87147 CULTURE TYPE IMMUNOLOGIC: CPT

## 2022-09-13 PROCEDURE — 73620 X-RAY EXAM OF FOOT: CPT

## 2022-09-13 PROCEDURE — 6360000002 HC RX W HCPCS: Performed by: STUDENT IN AN ORGANIZED HEALTH CARE EDUCATION/TRAINING PROGRAM

## 2022-09-13 PROCEDURE — 81001 URINALYSIS AUTO W/SCOPE: CPT

## 2022-09-13 PROCEDURE — 96365 THER/PROPH/DIAG IV INF INIT: CPT

## 2022-09-13 PROCEDURE — 86140 C-REACTIVE PROTEIN: CPT

## 2022-09-13 PROCEDURE — 85651 RBC SED RATE NONAUTOMATED: CPT

## 2022-09-13 PROCEDURE — 87077 CULTURE AEROBIC IDENTIFY: CPT

## 2022-09-13 PROCEDURE — 87075 CULTR BACTERIA EXCEPT BLOOD: CPT

## 2022-09-13 PROCEDURE — 87070 CULTURE OTHR SPECIMN AEROBIC: CPT

## 2022-09-13 PROCEDURE — 1200000000 HC SEMI PRIVATE

## 2022-09-13 PROCEDURE — 6370000000 HC RX 637 (ALT 250 FOR IP): Performed by: STUDENT IN AN ORGANIZED HEALTH CARE EDUCATION/TRAINING PROGRAM

## 2022-09-13 PROCEDURE — 83605 ASSAY OF LACTIC ACID: CPT

## 2022-09-13 RX ORDER — ACETAMINOPHEN 650 MG/1
650 SUPPOSITORY RECTAL EVERY 6 HOURS PRN
Status: DISCONTINUED | OUTPATIENT
Start: 2022-09-13 | End: 2022-09-26 | Stop reason: HOSPADM

## 2022-09-13 RX ORDER — 0.9 % SODIUM CHLORIDE 0.9 %
1000 INTRAVENOUS SOLUTION INTRAVENOUS ONCE
Status: COMPLETED | OUTPATIENT
Start: 2022-09-13 | End: 2022-09-13

## 2022-09-13 RX ORDER — SULFAMETHOXAZOLE AND TRIMETHOPRIM 800; 160 MG/1; MG/1
2 TABLET ORAL ONCE
Status: COMPLETED | OUTPATIENT
Start: 2022-09-13 | End: 2022-09-13

## 2022-09-13 RX ORDER — POLYETHYLENE GLYCOL 3350 17 G/17G
17 POWDER, FOR SOLUTION ORAL DAILY PRN
Status: DISCONTINUED | OUTPATIENT
Start: 2022-09-13 | End: 2022-09-26 | Stop reason: HOSPADM

## 2022-09-13 RX ORDER — SODIUM CHLORIDE 0.9 % (FLUSH) 0.9 %
10 SYRINGE (ML) INJECTION EVERY 12 HOURS SCHEDULED
Status: DISCONTINUED | OUTPATIENT
Start: 2022-09-13 | End: 2022-09-17 | Stop reason: SDUPTHER

## 2022-09-13 RX ORDER — ENOXAPARIN SODIUM 100 MG/ML
40 INJECTION SUBCUTANEOUS DAILY
Status: DISCONTINUED | OUTPATIENT
Start: 2022-09-14 | End: 2022-09-26 | Stop reason: HOSPADM

## 2022-09-13 RX ORDER — 0.9 % SODIUM CHLORIDE 0.9 %
1000 INTRAVENOUS SOLUTION INTRAVENOUS ONCE
Status: COMPLETED | OUTPATIENT
Start: 2022-09-13 | End: 2022-09-14

## 2022-09-13 RX ORDER — SULFAMETHOXAZOLE AND TRIMETHOPRIM 800; 160 MG/1; MG/1
2 TABLET ORAL EVERY 12 HOURS SCHEDULED
Status: DISCONTINUED | OUTPATIENT
Start: 2022-09-13 | End: 2022-09-14

## 2022-09-13 RX ORDER — ACETAMINOPHEN 325 MG/1
650 TABLET ORAL EVERY 6 HOURS PRN
Status: DISCONTINUED | OUTPATIENT
Start: 2022-09-13 | End: 2022-09-26 | Stop reason: HOSPADM

## 2022-09-13 RX ORDER — DOCUSATE SODIUM 100 MG/1
100 CAPSULE, LIQUID FILLED ORAL ONCE
Status: COMPLETED | OUTPATIENT
Start: 2022-09-13 | End: 2022-09-14

## 2022-09-13 RX ORDER — SODIUM CHLORIDE 0.9 % (FLUSH) 0.9 %
10 SYRINGE (ML) INJECTION PRN
Status: DISCONTINUED | OUTPATIENT
Start: 2022-09-13 | End: 2022-09-17 | Stop reason: SDUPTHER

## 2022-09-13 RX ORDER — PROMETHAZINE HYDROCHLORIDE 25 MG/1
12.5 TABLET ORAL EVERY 6 HOURS PRN
Status: DISCONTINUED | OUTPATIENT
Start: 2022-09-13 | End: 2022-09-26 | Stop reason: HOSPADM

## 2022-09-13 RX ORDER — SODIUM CHLORIDE 9 MG/ML
INJECTION, SOLUTION INTRAVENOUS PRN
Status: DISCONTINUED | OUTPATIENT
Start: 2022-09-13 | End: 2022-09-26 | Stop reason: HOSPADM

## 2022-09-13 RX ORDER — ONDANSETRON 2 MG/ML
4 INJECTION INTRAMUSCULAR; INTRAVENOUS EVERY 6 HOURS PRN
Status: DISCONTINUED | OUTPATIENT
Start: 2022-09-13 | End: 2022-09-26 | Stop reason: HOSPADM

## 2022-09-13 RX ADMIN — SODIUM CHLORIDE 1000 ML: 9 INJECTION, SOLUTION INTRAVENOUS at 16:59

## 2022-09-13 RX ADMIN — CEFEPIME 2000 MG: 2 INJECTION, POWDER, FOR SOLUTION INTRAVENOUS at 16:38

## 2022-09-13 RX ADMIN — SULFAMETHOXAZOLE AND TRIMETHOPRIM 2 TABLET: 800; 160 TABLET ORAL at 16:37

## 2022-09-13 RX ADMIN — SODIUM CHLORIDE 1000 ML: 9 INJECTION, SOLUTION INTRAVENOUS at 22:00

## 2022-09-13 ASSESSMENT — PAIN - FUNCTIONAL ASSESSMENT: PAIN_FUNCTIONAL_ASSESSMENT: NONE - DENIES PAIN

## 2022-09-13 NOTE — Clinical Note
Discharge Plan[de-identified] Other/[facility])   Telemetry/Cardiac Monitoring Required?: Yes

## 2022-09-13 NOTE — [facility] REMOTE MONITORING
Attempted to contact primary RN regarding the 3 hr and 6 hr sepsis bundle.   Thank you,    Laura Duran RN LACY Rosado[facility], [address]  Callback# [phone]

## 2022-09-14 ENCOUNTER — ANESTHESIA EVENT (OUTPATIENT)
Dept: OPERATING ROOM | Age: 62
DRG: 854 | End: 2022-09-14
Payer: COMMERCIAL

## 2022-09-14 ENCOUNTER — ANESTHESIA (OUTPATIENT)
Dept: OPERATING ROOM | Age: 62
DRG: 854 | End: 2022-09-14
Payer: COMMERCIAL

## 2022-09-14 LAB
ACINETOBACTER CALCOAC BAUMANNII COMPLEX BY PCR: NOT DETECTED
ALBUMIN SERPL-MCNC: 2.9 G/DL (ref 3.5–5.2)
ALP BLD-CCNC: 85 U/L (ref 40–129)
ALT SERPL-CCNC: 14 U/L (ref 0–40)
ANION GAP SERPL CALCULATED.3IONS-SCNC: 13 MMOL/L (ref 7–16)
AST SERPL-CCNC: 31 U/L (ref 0–39)
BACTEROIDES FRAGILIS BY PCR: NOT DETECTED
BASOPHILS ABSOLUTE: 0 E9/L (ref 0–0.2)
BASOPHILS RELATIVE PERCENT: 0 % (ref 0–2)
BILIRUB SERPL-MCNC: 0.6 MG/DL (ref 0–1.2)
BOTTLE TYPE: ABNORMAL
BUN BLDV-MCNC: 16 MG/DL (ref 6–23)
CALCIUM SERPL-MCNC: 7.9 MG/DL (ref 8.6–10.2)
CANDIDA ALBICANS BY PCR: NOT DETECTED
CANDIDA AURIS BY PCR: NOT DETECTED
CANDIDA GLABRATA BY PCR: NOT DETECTED
CANDIDA KRUSEI BY PCR: NOT DETECTED
CANDIDA PARAPSILOSIS BY PCR: NOT DETECTED
CANDIDA TROPICALIS BY PCR: NOT DETECTED
CHLORIDE BLD-SCNC: 93 MMOL/L (ref 98–107)
CHP ED QC CHECK: YES
CO2: 21 MMOL/L (ref 22–29)
CREAT SERPL-MCNC: 0.9 MG/DL (ref 0.7–1.2)
CRYPTOCOCCUS NEOFORMANS/GATTII BY PCR: NOT DETECTED
EKG ATRIAL RATE: 104 BPM
EKG P AXIS: 20 DEGREES
EKG P-R INTERVAL: 170 MS
EKG Q-T INTERVAL: 372 MS
EKG QRS DURATION: 80 MS
EKG QTC CALCULATION (BAZETT): 489 MS
EKG R AXIS: 11 DEGREES
EKG T AXIS: 38 DEGREES
EKG VENTRICULAR RATE: 104 BPM
ENTEROBACTER CLOACAE COMPLEX BY PCR: NOT DETECTED
ENTEROBACTERALES BY PCR: NOT DETECTED
ENTEROCOCCUS FAECALIS BY PCR: NOT DETECTED
ENTEROCOCCUS FAECIUM BY PCR: NOT DETECTED
EOSINOPHILS ABSOLUTE: 0 E9/L (ref 0.05–0.5)
EOSINOPHILS RELATIVE PERCENT: 0 % (ref 0–6)
ESCHERICHIA COLI BY PCR: NOT DETECTED
GFR AFRICAN AMERICAN: >60
GFR NON-AFRICAN AMERICAN: >60 ML/MIN/1.73
GLUCOSE BLD-MCNC: 143 MG/DL (ref 74–99)
GLUCOSE BLD-MCNC: 154 MG/DL
HAEMOPHILUS INFLUENZAE BY PCR: NOT DETECTED
HCT VFR BLD CALC: 29.3 % (ref 37–54)
HEMOGLOBIN: 9.8 G/DL (ref 12.5–16.5)
KLEBSIELLA AEROGENES BY PCR: NOT DETECTED
KLEBSIELLA OXYTOCA BY PCR: NOT DETECTED
KLEBSIELLA PNEUMONIAE GROUP BY PCR: NOT DETECTED
LACTIC ACID: 1.3 MMOL/L (ref 0.5–2.2)
LISTERIA MONOCYTOGENES BY PCR: NOT DETECTED
LYMPHOCYTES ABSOLUTE: 0 E9/L (ref 1.5–4)
LYMPHOCYTES RELATIVE PERCENT: 0 % (ref 20–42)
MCH RBC QN AUTO: 29 PG (ref 26–35)
MCHC RBC AUTO-ENTMCNC: 33.4 % (ref 32–34.5)
MCV RBC AUTO: 86.7 FL (ref 80–99.9)
METER GLUCOSE: 109 MG/DL (ref 74–99)
METER GLUCOSE: 143 MG/DL (ref 74–99)
METER GLUCOSE: 154 MG/DL (ref 74–99)
METER GLUCOSE: 171 MG/DL (ref 74–99)
METER GLUCOSE: 178 MG/DL (ref 74–99)
MONOCYTES ABSOLUTE: 1.04 E9/L (ref 0.1–0.95)
MONOCYTES RELATIVE PERCENT: 4 % (ref 2–12)
NEISSERIA MENINGITIDIS BY PCR: NOT DETECTED
NEUTROPHILS ABSOLUTE: 24.86 E9/L (ref 1.8–7.3)
NEUTROPHILS RELATIVE PERCENT: 96 % (ref 43–80)
ORDER NUMBER: ABNORMAL
PDW BLD-RTO: 13.2 FL (ref 11.5–15)
PLATELET # BLD: 300 E9/L (ref 130–450)
PMV BLD AUTO: 8.6 FL (ref 7–12)
POTASSIUM REFLEX MAGNESIUM: 3.6 MMOL/L (ref 3.5–5)
PROTEUS SPECIES BY PCR: NOT DETECTED
PSEUDOMONAS AERUGINOSA BY PCR: NOT DETECTED
RBC # BLD: 3.38 E12/L (ref 3.8–5.8)
SALMONELLA SPECIES BY PCR: NOT DETECTED
SERRATIA MARCESCENS BY PCR: NOT DETECTED
SODIUM BLD-SCNC: 127 MMOL/L (ref 132–146)
SOURCE OF BLOOD CULTURE: ABNORMAL
STAPHYLOCOCCUS AUREUS BY PCR: NOT DETECTED
STAPHYLOCOCCUS EPIDERMIDIS BY PCR: NOT DETECTED
STAPHYLOCOCCUS LUGDUNENSIS BY PCR: NOT DETECTED
STAPHYLOCOCCUS SPECIES BY PCR: NOT DETECTED
STENOTROPHOMONAS MALTOPHILIA BY PCR: NOT DETECTED
STREPTOCOCCUS AGALACTIAE BY PCR: NOT DETECTED
STREPTOCOCCUS PNEUMONIAE BY PCR: NOT DETECTED
STREPTOCOCCUS PYOGENES  BY PCR: DETECTED
STREPTOCOCCUS SPECIES BY PCR: DETECTED
TOTAL PROTEIN: 6.4 G/DL (ref 6.4–8.3)
WBC # BLD: 25.9 E9/L (ref 4.5–11.5)

## 2022-09-14 PROCEDURE — 36415 COLL VENOUS BLD VENIPUNCTURE: CPT

## 2022-09-14 PROCEDURE — 3700000000 HC ANESTHESIA ATTENDED CARE: Performed by: PODIATRIST

## 2022-09-14 PROCEDURE — 1200000000 HC SEMI PRIVATE

## 2022-09-14 PROCEDURE — 83605 ASSAY OF LACTIC ACID: CPT

## 2022-09-14 PROCEDURE — 7100000000 HC PACU RECOVERY - FIRST 15 MIN: Performed by: PODIATRIST

## 2022-09-14 PROCEDURE — 87206 SMEAR FLUORESCENT/ACID STAI: CPT

## 2022-09-14 PROCEDURE — 88304 TISSUE EXAM BY PATHOLOGIST: CPT

## 2022-09-14 PROCEDURE — 2580000003 HC RX 258

## 2022-09-14 PROCEDURE — 3600000012 HC SURGERY LEVEL 2 ADDTL 15MIN: Performed by: PODIATRIST

## 2022-09-14 PROCEDURE — 85025 COMPLETE CBC W/AUTO DIFF WBC: CPT

## 2022-09-14 PROCEDURE — 87075 CULTR BACTERIA EXCEPT BLOOD: CPT

## 2022-09-14 PROCEDURE — 99233 SBSQ HOSP IP/OBS HIGH 50: CPT | Performed by: INTERNAL MEDICINE

## 2022-09-14 PROCEDURE — 87205 SMEAR GRAM STAIN: CPT

## 2022-09-14 PROCEDURE — 87102 FUNGUS ISOLATION CULTURE: CPT

## 2022-09-14 PROCEDURE — 2580000003 HC RX 258: Performed by: INTERNAL MEDICINE

## 2022-09-14 PROCEDURE — 88342 IMHCHEM/IMCYTCHM 1ST ANTB: CPT

## 2022-09-14 PROCEDURE — 87015 SPECIMEN INFECT AGNT CONCNTJ: CPT

## 2022-09-14 PROCEDURE — 80053 COMPREHEN METABOLIC PANEL: CPT

## 2022-09-14 PROCEDURE — 82962 GLUCOSE BLOOD TEST: CPT

## 2022-09-14 PROCEDURE — 7100000001 HC PACU RECOVERY - ADDTL 15 MIN: Performed by: PODIATRIST

## 2022-09-14 PROCEDURE — 6370000000 HC RX 637 (ALT 250 FOR IP): Performed by: STUDENT IN AN ORGANIZED HEALTH CARE EDUCATION/TRAINING PROGRAM

## 2022-09-14 PROCEDURE — 6360000002 HC RX W HCPCS: Performed by: INTERNAL MEDICINE

## 2022-09-14 PROCEDURE — 3700000001 HC ADD 15 MINUTES (ANESTHESIA): Performed by: PODIATRIST

## 2022-09-14 PROCEDURE — 2709999900 HC NON-CHARGEABLE SUPPLY: Performed by: PODIATRIST

## 2022-09-14 PROCEDURE — 6370000000 HC RX 637 (ALT 250 FOR IP): Performed by: INTERNAL MEDICINE

## 2022-09-14 PROCEDURE — 87116 MYCOBACTERIA CULTURE: CPT

## 2022-09-14 PROCEDURE — 88311 DECALCIFY TISSUE: CPT

## 2022-09-14 PROCEDURE — 2500000003 HC RX 250 WO HCPCS: Performed by: PODIATRIST

## 2022-09-14 PROCEDURE — 87070 CULTURE OTHR SPECIMN AEROBIC: CPT

## 2022-09-14 PROCEDURE — 3600000002 HC SURGERY LEVEL 2 BASE: Performed by: PODIATRIST

## 2022-09-14 PROCEDURE — 87176 TISSUE HOMOGENIZATION CULTR: CPT

## 2022-09-14 PROCEDURE — 6360000002 HC RX W HCPCS

## 2022-09-14 PROCEDURE — 0Q9M0ZX DRAINAGE OF LEFT TARSAL, OPEN APPROACH, DIAGNOSTIC: ICD-10-PCS | Performed by: PODIATRIST

## 2022-09-14 RX ORDER — LANOLIN ALCOHOL/MO/W.PET/CERES
9 CREAM (GRAM) TOPICAL NIGHTLY PRN
Status: DISCONTINUED | OUTPATIENT
Start: 2022-09-14 | End: 2022-09-26 | Stop reason: HOSPADM

## 2022-09-14 RX ORDER — FENTANYL CITRATE 50 UG/ML
25 INJECTION, SOLUTION INTRAMUSCULAR; INTRAVENOUS EVERY 5 MIN PRN
Status: DISCONTINUED | OUTPATIENT
Start: 2022-09-14 | End: 2022-09-14 | Stop reason: HOSPADM

## 2022-09-14 RX ORDER — BUPIVACAINE HYDROCHLORIDE 5 MG/ML
INJECTION, SOLUTION EPIDURAL; INTRACAUDAL PRN
Status: DISCONTINUED | OUTPATIENT
Start: 2022-09-14 | End: 2022-09-14 | Stop reason: ALTCHOICE

## 2022-09-14 RX ORDER — MIDAZOLAM HYDROCHLORIDE 1 MG/ML
INJECTION INTRAMUSCULAR; INTRAVENOUS PRN
Status: DISCONTINUED | OUTPATIENT
Start: 2022-09-14 | End: 2022-09-14 | Stop reason: SDUPTHER

## 2022-09-14 RX ORDER — SODIUM CHLORIDE 9 MG/ML
INJECTION, SOLUTION INTRAVENOUS PRN
Status: DISCONTINUED | OUTPATIENT
Start: 2022-09-14 | End: 2022-09-14 | Stop reason: SDUPTHER

## 2022-09-14 RX ORDER — CLOPIDOGREL BISULFATE 75 MG/1
75 TABLET ORAL DAILY
Status: DISCONTINUED | OUTPATIENT
Start: 2022-09-14 | End: 2022-09-26 | Stop reason: HOSPADM

## 2022-09-14 RX ORDER — SODIUM CHLORIDE 0.9 % (FLUSH) 0.9 %
5-40 SYRINGE (ML) INJECTION EVERY 12 HOURS SCHEDULED
Status: DISCONTINUED | OUTPATIENT
Start: 2022-09-14 | End: 2022-09-14 | Stop reason: SDUPTHER

## 2022-09-14 RX ORDER — SODIUM CHLORIDE 9 MG/ML
INJECTION, SOLUTION INTRAVENOUS CONTINUOUS
Status: DISCONTINUED | OUTPATIENT
Start: 2022-09-14 | End: 2022-09-16

## 2022-09-14 RX ORDER — FENTANYL CITRATE 50 UG/ML
INJECTION, SOLUTION INTRAMUSCULAR; INTRAVENOUS PRN
Status: DISCONTINUED | OUTPATIENT
Start: 2022-09-14 | End: 2022-09-14 | Stop reason: SDUPTHER

## 2022-09-14 RX ORDER — SODIUM CHLORIDE 0.9 % (FLUSH) 0.9 %
5-40 SYRINGE (ML) INJECTION PRN
Status: DISCONTINUED | OUTPATIENT
Start: 2022-09-14 | End: 2022-09-14 | Stop reason: HOSPADM

## 2022-09-14 RX ORDER — ASPIRIN 81 MG/1
81 TABLET ORAL DAILY
Status: DISCONTINUED | OUTPATIENT
Start: 2022-09-14 | End: 2022-09-26 | Stop reason: HOSPADM

## 2022-09-14 RX ORDER — CYCLOBENZAPRINE HCL 5 MG
10 TABLET ORAL NIGHTLY
Status: DISCONTINUED | OUTPATIENT
Start: 2022-09-14 | End: 2022-09-26 | Stop reason: HOSPADM

## 2022-09-14 RX ORDER — PROPOFOL 10 MG/ML
INJECTION, EMULSION INTRAVENOUS CONTINUOUS PRN
Status: DISCONTINUED | OUTPATIENT
Start: 2022-09-14 | End: 2022-09-14 | Stop reason: SDUPTHER

## 2022-09-14 RX ORDER — HYDROCHLOROTHIAZIDE 25 MG/1
25 TABLET ORAL DAILY
Status: DISCONTINUED | OUTPATIENT
Start: 2022-09-14 | End: 2022-09-14

## 2022-09-14 RX ORDER — SODIUM CHLORIDE 9 MG/ML
INJECTION, SOLUTION INTRAVENOUS CONTINUOUS PRN
Status: DISCONTINUED | OUTPATIENT
Start: 2022-09-14 | End: 2022-09-14 | Stop reason: SDUPTHER

## 2022-09-14 RX ORDER — ATORVASTATIN CALCIUM 40 MG/1
40 TABLET, FILM COATED ORAL NIGHTLY
Status: DISCONTINUED | OUTPATIENT
Start: 2022-09-14 | End: 2022-09-26 | Stop reason: HOSPADM

## 2022-09-14 RX ORDER — SODIUM CHLORIDE 0.9 % (FLUSH) 0.9 %
5-40 SYRINGE (ML) INJECTION EVERY 12 HOURS SCHEDULED
Status: DISCONTINUED | OUTPATIENT
Start: 2022-09-14 | End: 2022-09-14 | Stop reason: HOSPADM

## 2022-09-14 RX ORDER — SODIUM CHLORIDE 9 MG/ML
INJECTION, SOLUTION INTRAVENOUS PRN
Status: DISCONTINUED | OUTPATIENT
Start: 2022-09-14 | End: 2022-09-14 | Stop reason: HOSPADM

## 2022-09-14 RX ORDER — 0.9 % SODIUM CHLORIDE 0.9 %
1000 INTRAVENOUS SOLUTION INTRAVENOUS ONCE
Status: COMPLETED | OUTPATIENT
Start: 2022-09-14 | End: 2022-09-14

## 2022-09-14 RX ORDER — INSULIN LISPRO 100 [IU]/ML
0-4 INJECTION, SOLUTION INTRAVENOUS; SUBCUTANEOUS EVERY 4 HOURS
Status: DISCONTINUED | OUTPATIENT
Start: 2022-09-14 | End: 2022-09-26 | Stop reason: HOSPADM

## 2022-09-14 RX ORDER — GABAPENTIN 300 MG/1
300 CAPSULE ORAL 2 TIMES DAILY
Status: DISCONTINUED | OUTPATIENT
Start: 2022-09-14 | End: 2022-09-26 | Stop reason: HOSPADM

## 2022-09-14 RX ORDER — DIPHENHYDRAMINE HCL 25 MG
25 TABLET ORAL EVERY 6 HOURS PRN
Status: DISCONTINUED | OUTPATIENT
Start: 2022-09-14 | End: 2022-09-26 | Stop reason: HOSPADM

## 2022-09-14 RX ORDER — SODIUM CHLORIDE 0.9 % (FLUSH) 0.9 %
5-40 SYRINGE (ML) INJECTION PRN
Status: DISCONTINUED | OUTPATIENT
Start: 2022-09-14 | End: 2022-09-14 | Stop reason: SDUPTHER

## 2022-09-14 RX ORDER — DEXTROSE MONOHYDRATE 100 MG/ML
INJECTION, SOLUTION INTRAVENOUS CONTINUOUS PRN
Status: DISCONTINUED | OUTPATIENT
Start: 2022-09-14 | End: 2022-09-26 | Stop reason: HOSPADM

## 2022-09-14 RX ADMIN — MIDAZOLAM 2 MG: 1 INJECTION INTRAMUSCULAR; INTRAVENOUS at 16:28

## 2022-09-14 RX ADMIN — PROPOFOL 30 MG: 10 INJECTION, EMULSION INTRAVENOUS at 16:37

## 2022-09-14 RX ADMIN — FENTANYL CITRATE 100 MCG: 50 INJECTION, SOLUTION INTRAMUSCULAR; INTRAVENOUS at 16:30

## 2022-09-14 RX ADMIN — CEFEPIME 2000 MG: 2 INJECTION, POWDER, FOR SOLUTION INTRAVENOUS at 06:35

## 2022-09-14 RX ADMIN — VANCOMYCIN HYDROCHLORIDE 1750 MG: 10 INJECTION, POWDER, LYOPHILIZED, FOR SOLUTION INTRAVENOUS at 09:32

## 2022-09-14 RX ADMIN — ATORVASTATIN CALCIUM 40 MG: 40 TABLET, FILM COATED ORAL at 21:28

## 2022-09-14 RX ADMIN — Medication 10 ML: at 00:18

## 2022-09-14 RX ADMIN — ENOXAPARIN SODIUM 40 MG: 100 INJECTION SUBCUTANEOUS at 09:31

## 2022-09-14 RX ADMIN — PROPOFOL 100 MCG/KG/MIN: 10 INJECTION, EMULSION INTRAVENOUS at 16:30

## 2022-09-14 RX ADMIN — CLOPIDOGREL BISULFATE 75 MG: 75 TABLET ORAL at 09:31

## 2022-09-14 RX ADMIN — Medication 9 MG: at 21:34

## 2022-09-14 RX ADMIN — GABAPENTIN 300 MG: 300 CAPSULE ORAL at 21:28

## 2022-09-14 RX ADMIN — PROPOFOL 30 MG: 10 INJECTION, EMULSION INTRAVENOUS at 16:33

## 2022-09-14 RX ADMIN — PROPOFOL 50 MG: 10 INJECTION, EMULSION INTRAVENOUS at 16:31

## 2022-09-14 RX ADMIN — CYCLOBENZAPRINE HYDROCHLORIDE 10 MG: 5 TABLET, FILM COATED ORAL at 21:28

## 2022-09-14 RX ADMIN — DOCUSATE SODIUM 100 MG: 100 CAPSULE, LIQUID FILLED ORAL at 09:31

## 2022-09-14 RX ADMIN — SULFAMETHOXAZOLE AND TRIMETHOPRIM 2 TABLET: 800; 160 TABLET ORAL at 00:19

## 2022-09-14 RX ADMIN — SODIUM CHLORIDE 1000 ML: 9 INJECTION, SOLUTION INTRAVENOUS at 07:15

## 2022-09-14 RX ADMIN — SODIUM CHLORIDE: 900 INJECTION, SOLUTION INTRAVENOUS at 16:20

## 2022-09-14 RX ADMIN — ASPIRIN 81 MG: 81 TABLET, COATED ORAL at 09:31

## 2022-09-14 RX ADMIN — FENTANYL CITRATE 100 MCG: 50 INJECTION, SOLUTION INTRAMUSCULAR; INTRAVENOUS at 16:40

## 2022-09-14 RX ADMIN — SODIUM CHLORIDE: 900 INJECTION, SOLUTION INTRAVENOUS at 07:15

## 2022-09-14 RX ADMIN — GABAPENTIN 300 MG: 300 CAPSULE ORAL at 09:31

## 2022-09-14 ASSESSMENT — ENCOUNTER SYMPTOMS
SHORTNESS OF BREATH: 0
NAUSEA: 0
ABDOMINAL DISTENTION: 0
DIARRHEA: 0
ABDOMINAL PAIN: 0
COUGH: 0
VOMITING: 0
PHOTOPHOBIA: 0
BACK PAIN: 0
CHEST TIGHTNESS: 0

## 2022-09-14 ASSESSMENT — PAIN - FUNCTIONAL ASSESSMENT
PAIN_FUNCTIONAL_ASSESSMENT: NONE - DENIES PAIN
PAIN_FUNCTIONAL_ASSESSMENT: NONE - DENIES PAIN

## 2022-09-14 ASSESSMENT — LIFESTYLE VARIABLES: SMOKING_STATUS: 0

## 2022-09-14 NOTE — PROGRESS NOTES
[address] [place], [place] [address]ist   Progress Note    Admitting Date and Time: 9/13/2022 12:00 PM  Admit Dx: Lactic acidosis [E87.2]  Septicemia ([address] [A41.9]  Wound infection [T14. 8XXA, L08.9]  Wound cellulitis [L03.90]  Sepsis ([address] [A41.9]    Subjective:    Pt feels tired but better than when he came in. .     Per RN:   patient to have emergent surgery on his left foot.     gabapentin  300 mg Oral BID    clopidogrel  75 mg Oral Daily    aspirin  81 mg Oral Daily    atorvastatin  40 mg Oral Nightly    cyclobenzaprine  10 mg Oral Nightly    insulin lispro  0-4 Units SubCUTAneous Q4H    vancomycin  1,000 mg IntraVENous Q12H    cefepime  2,000 mg IntraVENous Q12H    sulfamethoxazole-trimethoprim  2 tablet Oral 2 times per day    sodium chloride flush  10 mL IntraVENous 2 times per day    enoxaparin  40 mg SubCUTAneous Daily     melatonin, 9 mg, Nightly PRN  diphenhydrAMINE, 25 mg, Q6H PRN  glucose, 4 tablet, PRN  dextrose bolus, 125 mL, PRN   Or  dextrose bolus, 250 mL, PRN  glucagon (rDNA), 1 mg, PRN  dextrose, , Continuous PRN  sodium chloride flush, 10 mL, PRN  sodium chloride, , PRN  promethazine, 12.5 mg, Q6H PRN   Or  ondansetron, 4 mg, Q6H PRN  polyethylene glycol, 17 g, Daily PRN  acetaminophen, 650 mg, Q6H PRN   Or  acetaminophen, 650 mg, Q6H PRN         Objective:    /65   Pulse 84   Temp 99.6 °F (37.6 °C) (Oral)   Resp 18   Ht 5' 11\" (1.803 m)   Wt 192 lb (87.1 kg)   SpO2 99%   BMI 26.78 kg/m²   General Appearance: alert and oriented to person, place and time and in no acute distress  Skin: warm and dry  Head: normocephalic and atraumatic  Eyes: pupils equal, round, and reactive to light, extraocular eye movements intact, conjunctivae normal  Neck: neck supple and non tender without mass   Pulmonary/Chest: clear to auscultation bilaterally- no wheezes, rales or rhonchi, normal air movement, no respiratory distress  Cardiovascular: normal rate, normal S1 and S2 and no carotid bruits  Abdomen: soft, non-tender, non-distended, normal bowel sounds, no masses or organomegaly  Extremities: large left heel ulcer; left foot with significant swelling of dorsum of left foot.  See photos below  Neurologic: no cranial nerve deficit and speech normal              Recent Labs     09/13/22 1329 09/14/22  0632 09/14/22  0634   *  --  127*   K 3.8  --  3.6   CL 87*  --  93*   CO2 23  --  21*   BUN 13  --  16   CREATININE 1.0  --  0.9   GLUCOSE 194* 154 143*   CALCIUM 9.4  --  7.9*       Recent Labs     09/13/22 1329 09/14/22  0634   ALKPHOS 100 85   PROT 8.5* 6.4   LABALBU 3.6 2.9*   BILITOT 1.0 0.6   AST 23 31   ALT 15 14       Recent Labs     09/13/22 1329 09/14/22  0634   WBC 33.6* 25.9*   RBC 4.06 3.38*   HGB 12.0* 9.8*   HCT 35.3* 29.3*   MCV 86.9 86.7   MCH 29.6 29.0   MCHC 34.0 33.4   RDW 13.3 13.2   * 300   MPV 8.7 8.6      Latest Reference Range & Units 9/13/22 12:00 9/13/22 14:59 9/14/22 12:35   Lactic Acid 0.5 - 2.2 mmol/L 4.3 (HH) 4.1 (HH) 1.3   (HH): Data is critically high      C-Reactive Protein [[id number]] (Abnormal) Collected: 09/13/22 1459     Specimen: Blood Updated: 09/13/22 1955      CRP 24.9 mg/dL        Sedimentation Rate [[id number]] (Abnormal) Collected: 09/13/22 1459     Specimen: Blood Updated: 09/13/22 1650      Sed Rate 90 mm/Hr        Culture, Blood 2 [[id number]] (Abnormal) Collected: 09/13/22 1459     Specimen: Blood Updated: 09/14/22 1032      Culture, Blood 2 --      Gram stain performed from blood culture bottle media   Gram positive cocci in chains   Previously positive blood culture called        Narrative:       Adelfo Amaya tel. [phone],   Previous panic on this admission - call not needed per SOP, 09/14/2022 10:31,   by ELLI     Culture, Blood 1 [[id number]] (Abnormal) Collected: 09/13/22 1459     Specimen: Blood Updated: 09/14/22 1027      Blood Culture, Routine --      Gram stain performed from blood culture bottle media   Gram positive cocci in chains        Narrative:       Source: BLOOD       Site: left ac         Radiology:   XR FOOT LEFT (2 VIEWS)   Final Result   Marked soft tissue swelling in the dorsal aspect of the mid and distal foot. Slight interval improvement of soft tissue ulceration in the heel and   cortical irregularity along the posterior margin of the calcaneus. Question hairline fracture, proximal phalanx of 5th toe. Please correlate   with focal tenderness. XR CHEST 1 VIEW   Final Result   No acute process. There are multiple old left-sided rib fractures. Assessment:  Principal Problem:    Wound infection  Active Problems:    Sepsis ([address]  Resolved Problems:    * No resolved hospital problems. *      Plan:      Sepsis due to cellulitis  as well as GPC bacteremia in patient with left diabetic foot ulcer  -Sepsis evidenced by leukocytosis (33.6), tachycardia (102) and cellulitis of the foot  -Podiatry and ID consulted  -Patient to go for emergent surgical debridement.  -Continue vancomycin and cefepime. No clear indication for oral Bactrim on top so we will discontinue that. Sed rate and CRP elevated at 90 and 24.9 respectively  -Follow WBC. Initial white count elevated at 33.6. 2. Lactic acidosis  -Due to the above but resolved with IV fluid hydration  -Lactic acid trend as follows 4.3-->4.1- >1.9    3. Type II DM  -Continue sliding scale insulin. 4. Hypertension/CAD  -Holding blood pressure meds for now. Patient is on a antiplatelet and statin currently    Case discussed with podiatry resident on the floor earlier today. NOTE: This report was transcribed using voice recognition software. Every effort was made to ensure accuracy; however, inadvertent computerized transcription errors may be present.      Electronically signed by Miguel West MD on 9/14/2022 at 2:23 PM

## 2022-09-14 NOTE — H&P
[address] [place], [place] [address]ist Group   HISTORY AND PHYSICAL EXAM      AUTHOR: Peterson Paris MD PATIENT NAME: Marlon Sears    MRN: [id number], : 1960   Primary Care Physician: Yaneth Hendricks MD     CHIEF COMPLAINT / REASON FOR ADMISSION: Chills, fatigue, fever and worsening of wound in left foot  HPI:   This is a 60 y.o. male  has a past medical history of Arthritis, Bilateral carpal tunnel syndrome, Cerebral artery occlusion with cerebral infarction ([address] Chronic back pain, CVA (cerebral vascular accident) ([address] Diabetes mellitus ([address] Diabetic foot ulcer with osteomyelitis ([address] 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 2 diabetes mellitus, with necrosis of bone ([address] Hemiparesis affecting left side as late effect of cerebrovascular accident ([address] Hyperlipidemia, Hypertension, Peripheral vascular angioplasty status, Ulcer of left heel, with necrosis of bone ([address] Ulcer of left heel, with necrosis of muscle ([address] and Vitamin D deficiency. presented with Chills, fatigue, fever and worsening of wound in left foot  for last few days prior to arrival to the hospital.  Patient was at his podiatrist office yesterday and was found to have a worsening wound and now having fever and chills. The patient was seen and examined at bedside, appears alert and awake with no acute distress and is able to answer simple  questions. On direct questioning, patient denied any  resting ongoing chest pain, resting SOB, hemoptysis, productive cough, ongoing palpitation, active abdominal pain, hematemesis, rectal bleeding, wally, hematuria, any other  and GI complaints, and any new focal neuro deficits.     ROS:  Pertinent positives and negatives are noted in the HPI, all other systems are reviewed and negative    PMH:  Past Medical History:   Diagnosis Date    Arthritis     Bilateral carpal tunnel syndrome 2016    Cerebral artery occlusion with cerebral infarction ([address]     Chronic back pain     CVA (cerebral vascular accident) ([address] 11/2015, 2017    Diabetes mellitus ([address]     Type 2    Diabetic foot ulcer with osteomyelitis ([address] 12/21/2021    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 2 diabetes mellitus, with necrosis of bone ([address] 12/21/2021    Hemiparesis affecting left side as late effect of cerebrovascular accident ([address]     LEFT SIDE NON DOMINANT FOLLOWING STROKE    Hyperlipidemia     Hypertension     Peripheral vascular angioplasty status 12/21/2021    Ulcer of left heel, with necrosis of bone ([address] 12/27/2021    Ulcer of left heel, with necrosis of muscle ([address] 12/21/2021    Vitamin D deficiency        Surgical History:  Past Surgical History:   Procedure Laterality Date    CARPAL TUNNEL RELEASE  10/01/2016    Dr. Prabhakar López Left 2/16/2021    LEFT FOOT DEBRIDEMENT WITH BONE BIOPSY, WOUND VAC APPLICATION performed by Eri Danielle DPM at [address]    TRANSESOPHAGEAL ECHOCARDIOGRAM N/A 2/22/2021    TRANSESOPHAGEAL ECHOCARDIOGRAM WITH BUBBLE STUDY performed by Diane Lucas MD at [address] [place] [address] 1/4/2021    EGD BIOPSY performed by Carlito Coleman DO at [place] [address]       Medications Prior to Admission:    Prior to Admission medications    Medication Sig Start Date End Date Taking? Authorizing Provider   cyclobenzaprine (FLEXERIL) 5 MG tablet take 1 tablet by mouth nightly if needed for muscle spasm 8/26/22   Stacy Rascon MD   glimepiride (AMARYL) 2 MG tablet Take 1 tablet by mouth every morning (before breakfast) 8/23/22   Stacy Rascon MD   metFORMIN (GLUCOPHAGE) 500 MG tablet Take 1 tablet by mouth in the morning and 1 tablet in the evening. Take with meals.  7/21/22   Stacy Rascon MD   atorvastatin (LIPITOR) 40 MG tablet Take 1 tablet by mouth nightly 7/21/22   Stacy Rascon MD   ibuprofen (ADVIL;MOTRIN) 200 MG tablet Take 200 mg by mouth every 6 hours as needed for Pain    Historical Provider, MD   gabapentin (NEURONTIN) 100 MG capsule Take 3 capsules by mouth 2 times daily for 30 days. 6/22/22 9/12/22  Twila Apple MD   hydroCHLOROthiazide (HYDRODIURIL) 25 MG tablet Take 1 tablet by mouth daily 6/17/22   Twila Apple MD   diphenhydrAMINE (BENADRYL) 25 MG capsule Take 25 mg by mouth every 6 hours as needed for Itching    Historical Provider, MD   B Complex-C-E-Zn (STRESS B/ZINC) TABS Take 1 tablet by mouth daily    Historical Provider, MD   acetaminophen (TYLENOL) 325 MG tablet Take 650 mg by mouth every 6 hours as needed for Pain    Historical Provider, MD   ferrous sulfate (IRON 325) 325 (65 Fe) MG tablet Take 325 mg by mouth daily (with breakfast)    Historical Provider, MD   vitamin D (ERGOCALCIFEROL) 1.25 MG (43585 UT) CAPS capsule Take 1 capsule by mouth once a week 1/12/21   Lakisha Wu MD   melatonin 3 MG TABS tablet Take 9 mg by mouth nightly as needed    Historical Provider, MD   aspirin 81 MG EC tablet Take 81 mg by mouth daily    Historical Provider, MD   clopidogrel (PLAVIX) 75 MG tablet Take 1 tablet by mouth daily 11/16/15   Pebbles Wilson DO       Allergies:    Pcn [penicillins]    Social History:    reports that he quit smoking about 7 years ago. His smoking use included cigarettes. He has a 25.00 pack-year smoking history. He has never used smokeless tobacco. He reports that he does not currently use alcohol. He reports that he does not use drugs. Family History:   family history is not on file. PHYSICAL EXAM:  Vitals:  BP (!) 78/41   Pulse 97   Temp 96.8 °F (36 °C) (Infrared)   Resp 16   Ht 5' 11\" (1.803 m)   Wt 192 lb (87.1 kg)   SpO2 94%   BMI 26.78 kg/m²   GENERAL: No acute distress, Alert and awake, Afebrile, Appears tired and weak otherwise hemodynamically stable at present. HEENT: PERRLA, no icterus. OP clear and no exudates. NECK: Supple  no carotid/ophthalmic bruits, JVD None.   RESPIRATORY: Bilateral equal vesicular breath sound with no wheezing. Lung bases are clear. HEART: No tachycardia at bedside and regular rhythm. Normal S1 and S2, No S3 or S4 is audible. No pulsation, thrills, murmur or friction rubs. ABDOMEN: Soft, nondistended, nontender. No hepatomegaly or splenomegaly. No CVA tenderness on the both sides. Bowel sound is present. Left foot wound is soacked and having some discharge and foul smell. Podiatry and wound care note reviewed. NEUROLOGY: Alert and awake. No new focal neuro deficit. Bilateral Pupil is equal and reactive to light. CN-ii-xii otherwise grossly intact. Motor and Sensory: Grossly Intact bilaterally with no new focal signs   LABS:  Recent Labs     09/13/22  1329   WBC 33.6*   RBC 4.06   HGB 12.0*   HCT 35.3*   MCV 86.9   MCH 29.6   MCHC 34.0   RDW 13.3   *   MPV 8.7     Recent Labs     09/13/22  1329 09/14/22  0632   *  --    K 3.8  --    CL 87*  --    CO2 23  --    BUN 13  --    CREATININE 1.0  --    GLUCOSE 194* 154   CALCIUM 9.4  --      No results for input(s): POCGLU in the last 72 hours.   Results for orders placed or performed during the hospital encounter of 09/13/22   CBC with Auto Differential   Result Value Ref Range    WBC 33.6 (H) 4.5 - 11.5 E9/L    RBC 4.06 3.80 - 5.80 E12/L    Hemoglobin 12.0 (L) 12.5 - 16.5 g/dL    Hematocrit 35.3 (L) 37.0 - 54.0 %    MCV 86.9 80.0 - 99.9 fL    MCH 29.6 26.0 - 35.0 pg    MCHC 34.0 32.0 - 34.5 %    RDW 13.3 11.5 - 15.0 fL    Platelets 481 (H) 636 - 450 E9/L    MPV 8.7 7.0 - 12.0 fL    Neutrophils % 97.4 (H) 43.0 - 80.0 %    Lymphocytes % 1.7 (L) 20.0 - 42.0 %    Monocytes % 0.9 (L) 2.0 - 12.0 %    Eosinophils % 0.2 0.0 - 6.0 %    Basophils % 0.3 0.0 - 2.0 %    Neutrophils Absolute 32.59 (H) 1.80 - 7.30 E9/L    Lymphocytes Absolute 0.67 (L) 1.50 - 4.00 E9/L    Monocytes Absolute 0.34 0.10 - 0.95 E9/L    Eosinophils Absolute 0.00 (L) 0.05 - 0.50 E9/L    Basophils Absolute 0.00 0.00 - 0.20 E9/L    Poikilocytes 1+     Chandler Cells 1+    Comprehensive Metabolic Panel w/ Reflex to MG   Result Value Ref Range    Sodium 126 (L) 132 - 146 mmol/L    Potassium reflex Magnesium 3.8 3.5 - 5.0 mmol/L    Chloride 87 (L) 98 - 107 mmol/L    CO2 23 22 - 29 mmol/L    Anion Gap 16 7 - 16 mmol/L    Glucose 194 (H) 74 - 99 mg/dL    BUN 13 6 - 23 mg/dL    Creatinine 1.0 0.7 - 1.2 mg/dL    GFR Non-African American >60 >=60 mL/min/1.73    GFR African American >60     Calcium 9.4 8.6 - 10.2 mg/dL    Total Protein 8.5 (H) 6.4 - 8.3 g/dL    Albumin 3.6 3.5 - 5.2 g/dL    Total Bilirubin 1.0 0.0 - 1.2 mg/dL    Alkaline Phosphatase 100 40 - 129 U/L    ALT 15 0 - 40 U/L    AST 23 0 - 39 U/L   Lactic Acid   Result Value Ref Range    Lactic Acid 4.3 (HH) 0.5 - 2.2 mmol/L   Troponin   Result Value Ref Range    Troponin, High Sensitivity 48 (H) 0 - 11 ng/L   Urinalysis with Microscopic   Result Value Ref Range    Color, UA Yellow Straw/Yellow    Clarity, UA Clear Clear    Glucose, Ur Negative Negative mg/dL    Bilirubin Urine Negative Negative    Ketones, Urine Negative Negative mg/dL    Specific Gravity, UA 1.020 1.005 - 1.030    Blood, Urine LARGE (A) Negative    pH, UA 6.0 5.0 - 9.0    Protein,  (A) Negative mg/dL    Urobilinogen, Urine 1.0 <2.0 E.U./dL    Nitrite, Urine Negative Negative    Leukocyte Esterase, Urine Negative Negative    WBC, UA 1-3 0 - 5 /HPF    RBC, UA 0-1 0 - 2 /HPF    Epithelial Cells, UA NONE SEEN /HPF    Bacteria, UA RARE (A) None Seen /HPF   Troponin   Result Value Ref Range    Troponin, High Sensitivity 48 (H) 0 - 11 ng/L   Sedimentation Rate   Result Value Ref Range    Sed Rate 90 (H) 0 - 15 mm/Hr   C-Reactive Protein   Result Value Ref Range    CRP 24.9 (H) 0.0 - 0.4 mg/dL   Lactic Acid   Result Value Ref Range    Lactic Acid 4.1 (HH) 0.5 - 2.2 mmol/L   POCT glucose   Result Value Ref Range    Glucose 154 mg/dL    QC OK?  yes    POCT Glucose   Result Value Ref Range    Meter Glucose 154 (H) 74 - 99 mg/dL   EKG 12 Lead Result Value Ref Range    Ventricular Rate 104 BPM    Atrial Rate 104 BPM    P-R Interval 170 ms    QRS Duration 80 ms    Q-T Interval 372 ms    QTc Calculation (Bazett) 489 ms    P Axis 20 degrees    R Axis 11 degrees    T Axis 38 degrees        Radiology: XR FOOT LEFT (2 VIEWS)    Result Date: 9/13/2022  EXAMINATION: TWO XRAY VIEWS OF THE LEFT FOOT 9/13/2022 2:13 pm COMPARISON: August 8, 2022 HISTORY: ORDERING SYSTEM PROVIDED HISTORY: pain wound TECHNOLOGIST PROVIDED HISTORY: Reason for exam:->pain wound FINDINGS: The bones are demineralized. Mild cortical irregularity at the base of the proximal phalanx of the 5th toe. There is deformity of the hindfoot, either posttraumatic or developmental.  There is marked soft tissue swelling, especially in the dorsal aspect of the mid and distal foot. There is soft tissue irregularity posterior to the calcaneus and mild cortical irregularity of the posterior margin of the calcaneus. Marked soft tissue swelling in the dorsal aspect of the mid and distal foot. Slight interval improvement of soft tissue ulceration in the heel and cortical irregularity along the posterior margin of the calcaneus. Question hairline fracture, proximal phalanx of 5th toe. Please correlate with focal tenderness. XR CHEST 1 VIEW    Result Date: 9/13/2022  EXAMINATION: ONE XRAY VIEW OF THE CHEST 9/13/2022 2:12 pm COMPARISON: None. HISTORY: ORDERING SYSTEM PROVIDED HISTORY: pna TECHNOLOGIST PROVIDED HISTORY: Reason for exam:->pna FINDINGS: The lungs are without acute focal process. There is no effusion or pneumothorax. The cardiomediastinal silhouette is without acute process. The osseous structures are without acute process. No acute process. There are multiple old left-sided rib fractures.      ASSESSMENT:    Present on Admission:   Wound infection   Sepsis ([address]    PLAN:  #  Sepsis from infected foot wound   MAP maintained with IV fluid bolus, will monitor vitals closely  WBC 33K  IV Cefepime + Vancomycin   IV Fluid as per sepsis protocol   Trend lactic acid 4.1   NOP, anticipate surgery in AM  # DM moderately controlled  Placed on sliding scale inulin   Bedside glucose before meals and bedtime and adjust insulin accordingly  # H/O CAD and prior IHD   Continue home meds  Antipletelets + Statin on board  Cardiology consult before discharge if needed  # Hypertension   Hold BP meds now  Will resume home medications as needed. # Rest of the chronic medical problems are stable and will be managed with appropriately with home medications, placed nursing communication order to verify home medications before giving them to the patient. # Diet: On PO Diet  # IVF's: No  # Fall Precaution: Yes  # Disposition: Home/primary residence   # Code Status: Full code by default  # DVT Prophylaxis : SC Heparin or SC Lovenox as on chart  The patient at bedside was counseled about clinical status, laboratory/imaging results, diagnoses, medication side effects, risk, and treatment plan, all questions were answered to patient's satisfaction and verbalized understanding      SIGNATURE: Nicki Swanson MD PATIENT NAME: Nidhi Willoughby   CONTACT #: Hospitalist on call MRN: [id number]     Disclaimer: Portions of this note may have been generated using Dragon voice recognition software. Reasonable efforts were made to correct any dictation errors that resulted due to the programming of this software but some may still be present.

## 2022-09-14 NOTE — ED NOTES
This RN took over for previous LPN, currently no telemetry hook up available at this time.       Rush Castleman  09/13/22 [address] [place] [address]  09/14/22 0106       Rush Castleman  09/14/22 0107

## 2022-09-14 NOTE — ANESTHESIA PRE PROCEDURE
Department of Anesthesiology  Preprocedure Note       Name:  Azael Caruso   Age:  60 y.o.  :  1960                                          MRN:  [id number]         Date:  2022      Surgeon: Bonny Powell):  Rober Swift DPM    Procedure: Procedure(s):  LEFT FOOT DEBRIDEMENT INCISION AND DRAINAGE    Medications prior to admission:   Prior to Admission medications    Medication Sig Start Date End Date Taking? Authorizing Provider   cyclobenzaprine (FLEXERIL) 5 MG tablet take 1 tablet by mouth nightly if needed for muscle spasm 22   Lore Cat MD   glimepiride (AMARYL) 2 MG tablet Take 1 tablet by mouth every morning (before breakfast) 22   Lore Cat MD   metFORMIN (GLUCOPHAGE) 500 MG tablet Take 1 tablet by mouth in the morning and 1 tablet in the evening. Take with meals. 22   Lore Cat MD   atorvastatin (LIPITOR) 40 MG tablet Take 1 tablet by mouth nightly 22   Lore Cat MD   ibuprofen (ADVIL;MOTRIN) 200 MG tablet Take 200 mg by mouth every 6 hours as needed for Pain    Historical Provider, MD   gabapentin (NEURONTIN) 100 MG capsule Take 3 capsules by mouth 2 times daily for 30 days.  22  Lore Cat MD   hydroCHLOROthiazide (HYDRODIURIL) 25 MG tablet Take 1 tablet by mouth daily 22   Lore Cat MD   diphenhydrAMINE (BENADRYL) 25 MG capsule Take 25 mg by mouth every 6 hours as needed for Itching    Historical Provider, MD   B Complex-C-E-Zn (STRESS B/ZINC) TABS Take 1 tablet by mouth daily    Historical Provider, MD   acetaminophen (TYLENOL) 325 MG tablet Take 650 mg by mouth every 6 hours as needed for Pain    Historical Provider, MD   ferrous sulfate (IRON 325) 325 (65 Fe) MG tablet Take 325 mg by mouth daily (with breakfast)    Historical Provider, MD   vitamin D (ERGOCALCIFEROL) 1.25 MG (48434 UT) CAPS capsule Take 1 capsule by mouth once a week 21   Stone Levin MD   melatonin 3 MG TABS tablet Take 9 mg by mouth nightly as needed    Historical Provider, MD   aspirin 81 MG EC tablet Take 81 mg by mouth daily    Historical Provider, MD   clopidogrel (PLAVIX) 75 MG tablet Take 1 tablet by mouth daily 11/16/15   Nadira Xavier DO       Current medications:    Current Facility-Administered Medications   Medication Dose Route Frequency Provider Last Rate Last Admin    gabapentin (NEURONTIN) capsule 300 mg  300 mg Oral BID Liza Sagastume MD   300 mg at 09/14/22 0931    clopidogrel (PLAVIX) tablet 75 mg  75 mg Oral Daily Liza Sagastume MD   75 mg at 09/14/22 0931    aspirin EC tablet 81 mg  81 mg Oral Daily Liza Sagastume MD   81 mg at 09/14/22 0931    melatonin tablet 9 mg  9 mg Oral Nightly PRN Liza Sagastume MD        diphenhydrAMINE (BENADRYL) tablet 25 mg  25 mg Oral Q6H PRN Liza Sagastume MD        atorvastatin (LIPITOR) tablet 40 mg  40 mg Oral Nightly Liza Sagastume MD        cyclobenzaprine (FLEXERIL) tablet 10 mg  10 mg Oral Nightly Liza Sagastume MD        glucose chewable tablet 16 g  4 tablet Oral PRN Liza Sagastume MD        dextrose bolus 10% 125 mL  125 mL IntraVENous PRN Liza Sagastume MD        Or    dextrose bolus 10% 250 mL  250 mL IntraVENous PRN Liza Sagastume MD        glucagon (rDNA) injection 1 mg  1 mg SubCUTAneous PRN Liza Sagastume MD        dextrose 10 % infusion   IntraVENous Continuous PRN Liza Sagastume MD        insulin lispro (HUMALOG) injection vial 0-4 Units  0-4 Units SubCUTAneous Q4H Liza Sagastume MD        0.9 % sodium chloride infusion   IntraVENous Continuous Liza Sagastume  mL/hr at 09/14/22 0715 New Bag at 09/14/22 0715    vancomycin (VANCOCIN) 1,000 mg in sodium chloride 0.9 % 250 mL IVPB (Absm5Oye)  1,000 mg IntraVENous Q12H Liza Sagastume MD        cefepime (MAXIPIME) 2,000 mg in sodium chloride 0.9 % 50 mL IVPB (Cqax5Rdc)  2,000 mg IntraVENous Q12H Liza Sagastume MD   Stopped at 09/14/22 0932    sulfamethoxazole-trimethoprim (BACTRIM DS;SEPTRA DS) 800-160 MG per tablet 2 tablet  2 tablet Oral 2 times per day Flor Sosa MD   2 tablet at 09/14/22 0019    sodium chloride flush 0.9 % injection 10 mL  10 mL IntraVENous 2 times per day Flor Sosa MD   10 mL at 09/14/22 0018    sodium chloride flush 0.9 % injection 10 mL  10 mL IntraVENous PRN Flor Sosa MD        0.9 % sodium chloride infusion   IntraVENous PRN Flor Sosa MD        enoxaparin (LOVENOX) injection 40 mg  40 mg SubCUTAneous Daily Flor Sosa MD   40 mg at 09/14/22 0931    promethazine (PHENERGAN) tablet 12.5 mg  12.5 mg Oral Q6H PRN Flor Sosa MD        Or    ondansetron (ZOFRAN) injection 4 mg  4 mg IntraVENous Q6H PRN Flor Sosa MD        polyethylene glycol (GLYCOLAX) packet 17 g  17 g Oral Daily PRN Flor Sosa MD        acetaminophen (TYLENOL) tablet 650 mg  650 mg Oral Q6H PRN Flor Sosa MD        Or    acetaminophen (TYLENOL) suppository 650 mg  650 mg Rectal Q6H PRN Flor Sosa MD           Allergies:     Allergies   Allergen Reactions    Pcn [Penicillins] Anaphylaxis       Problem List:    Patient Active Problem List   Diagnosis Code    [facility] I63.9    Diabetes mellitus ([facility] E11.9    Diabetic foot infection ([facility] E11.628, L08.9    Hypertension I10    Hemiparesis affecting left side as late effect of cerebrovascular accident [facility]) K71.825    Peripheral arterial disease ([facility] I73.9    Urinary tract infection due to Proteus N39.0, B96.4    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 2 diabetes mellitus, with necrosis of bone (HCC) E11.621, L97.424    Ulcer of left heel, with necrosis of muscle ([facility]) L97.423    Atherosclerosis of native artery of left lower extremity with ulceration of heel ([facility]) I70.244    Peripheral vascular angioplasty status Z98.62    Ulcer of left heel, with necrosis of bone ([facility]) L97.424    PVD (peripheral vascular disease) ([facility]) I73.9    Wound infection T14.8XXA, L08.9    Sepsis ([address] A41.9       Past Medical History:        Diagnosis Date    Arthritis     Bilateral carpal tunnel syndrome 2016    Cerebral artery occlusion with cerebral infarction (HCC)     Chronic back pain     CVA (cerebral vascular accident) ([address] 2015,     Diabetes mellitus ([address]     Type 2    Diabetic foot ulcer with osteomyelitis ([address] 2021    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 Geeta Lu Left 2021    LEFT FOOT DEBRIDEMENT WITH BONE BIOPSY, WOUND VAC APPLICATION performed by Heladio Ceballos DPM at [address] [facility]    TRANSESOPHAGEAL ECHOCARDIOGRAM N/A 2021    TRANSESOPHAGEAL ECHOCARDIOGRAM WITH BUBBLE STUDY performed by Michelle Theodore MD at [address] [facility] N/A 2021    EGD BIOPSY performed by Ted Valdes DO at [address] History:    Social History     Tobacco Use    Smoking status: Former     Packs/day: 1.00     Years: 25.00     Pack years: 25.00     Types: Cigarettes     Quit date: 2015     Years since quittin.7    Smokeless tobacco: Never   Substance Use Topics    Alcohol use: Not Currently     Comment: Former every day drinker for most of adult life                                Counseling given: Not Answered      Vital Signs (Current):   Vitals:    22 0730 found for: Colt Hammonds    Drug/Infectious Status (If Applicable):  No results found for: HIV, HEPCAB    COVID-19 Screening (If Applicable):   Lab Results   Component Value Date/Time    COVID19 Not Detected 2021 04:15 PM       EK2022  Narrative & Impression    Sinus tachycardia  Otherwise normal ECG  When compared with ECG of 2021 20:36,  Criteria for Inferior infarct are no longer present  T wave inversion no longer evident in Inferior leads     CXR 2022  Impression   No acute process.       There are multiple old left-sided rib fractures. Anesthesia Evaluation  Patient summary reviewed and Nursing notes reviewed no history of anesthetic complications:   Airway: Mallampati: II  TM distance: >3 FB   Neck ROM: full  Mouth opening: > = 3 FB   Dental:          Pulmonary:Negative Pulmonary ROS   (+) decreased breath sounds     (-) not a current smoker          Patient did not smoke on day of surgery. Cardiovascular:  Exercise tolerance: poor (<4 METS),   (+) hypertension:, valvular problems/murmurs (mild as per echo): AS, hyperlipidemia      ECG reviewed  Rhythm: regular  Rate: normal  Echocardiogram reviewed         Beta Blocker:  Not on Beta Blocker         Neuro/Psych:   (+) CVA:, neuromuscular disease:,             GI/Hepatic/Renal:             Endo/Other:    (+) DiabetesType II DM, , blood dyscrasia: anticoagulation therapy:., .                 Abdominal:             Vascular:   + PVD, aortic or cerebral, . Other Findings:           Anesthesia Plan      MAC     ASA 3       Induction: intravenous. Anesthetic plan and risks discussed with patient. Use of blood products discussed with patient whom consented to blood products. Plan discussed with CRNA and attending.     Attending anesthesiologist reviewed and agrees with Preprocedure content                Olivia Younger RN   2022

## 2022-09-14 NOTE — ED PROVIDER NOTES
Indu Ospina is a 60year old male with PMH of DM who presents emergency department with concern for wound infection. Patient was at his podiatrist office yesterday and was found to have a worsening wound. Patient stated that he was told that he would have to have surgery for likely amputation. Patient felt that is symptoms worsen overnight patient is fatigued and having subjective fever and chills. Patient presented to emergency department for evaluation patient is having difficulty ambulating due to pain in the foot. Nothing make symptoms better or worse symptoms are moderate in severity and constant and worsening    The history is provided by the patient and medical records. Review of Systems   Constitutional:  Positive for chills, fatigue and fever. Negative for diaphoresis. Eyes:  Negative for photophobia and visual disturbance. Respiratory:  Negative for cough, chest tightness and shortness of breath. Cardiovascular:  Negative for chest pain, palpitations and leg swelling. Gastrointestinal:  Negative for abdominal distention, abdominal pain, diarrhea, nausea and vomiting. Genitourinary:  Negative for dysuria. Musculoskeletal:  Negative for back pain, neck pain and neck stiffness. Skin:  Positive for rash and wound. Negative for pallor. Neurological:  Negative for headaches. Psychiatric/Behavioral:  Negative for confusion. Physical Exam  Vitals and nursing note reviewed. Constitutional:       General: He is not in acute distress. Appearance: Normal appearance. He is not ill-appearing. HENT:      Head: Normocephalic and atraumatic. Eyes:      General: No scleral icterus. Conjunctiva/sclera: Conjunctivae normal.      Pupils: Pupils are equal, round, and reactive to light. Cardiovascular:      Rate and Rhythm: Normal rate and regular rhythm.       Comments: Patient has doppler pulses intact  Pulmonary:      Effort: Pulmonary effort is normal.      Breath sounds: Normal breath sounds. Abdominal:      General: Bowel sounds are normal. There is no distension. Palpations: Abdomen is soft. Tenderness: There is no abdominal tenderness. There is no guarding or rebound. Musculoskeletal:      Cervical back: Normal range of motion and neck supple. No rigidity. No muscular tenderness. Right lower leg: No edema. Left lower leg: No edema. Skin:     General: Skin is warm and dry. Capillary Refill: Capillary refill takes less than 2 seconds. Coloration: Skin is not pale. Findings: Rash present. No erythema. Comments: wound   Neurological:      Mental Status: He is alert and oriented to person, place, and time. Psychiatric:         Mood and Affect: Mood normal.        Procedures     MDM  Number of Diagnoses or Management Options  Diagnosis management comments: Faviola Kincaid is a 27-year-old male present emergency department with concern for worsening wound. X-ray was stable. Patient did report increasing pain and worsening wound patient had a leukocytosis with elevated ESR and CRP. Patient given 2 L IV fluid and started on antibiotics based on previous cultures after discussion with pharmacy. Patient was started on cefepime and Bactrim. Cultures are pending blood and wound cultures.   Patient will be admitted to the hospital for sepsis likely due to wound infection patient is not having any abdominal pain, chest pain or shortness of breath patient is agreeable to plan and stable         --------------------------------------------- PAST HISTORY ---------------------------------------------  Past Medical History:  has a past medical history of Arthritis, Bilateral carpal tunnel syndrome, Cerebral artery occlusion with cerebral infarction [facility]), Chronic back pain, [facility] Diabetes mellitus ([facility] Diabetic foot ulcer with osteomyelitis ([facility] 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 2 diabetes mellitus, with necrosis of bone ([facility] Hemiparesis affecting left side as late effect of cerebrovascular accident ([address] Hyperlipidemia, Hypertension, Peripheral vascular angioplasty status, Ulcer of left heel, with necrosis of bone ([address] Ulcer of left heel, with necrosis of muscle ([address] and Vitamin D deficiency. Past Surgical History:  has a past surgical history that includes Knee arthroscopy; Finger amputation; Upper gastrointestinal endoscopy (N/A, 1/4/2021); Foot Debridement (Left, 2/16/2021); transesophageal echocardiogram (N/A, 2/22/2021); Carpal tunnel release (10/01/2016); and Tonsillectomy (1969). Social History:  reports that he quit smoking about 7 years ago. His smoking use included cigarettes. He has a 25.00 pack-year smoking history. He has never used smokeless tobacco. He reports that he does not currently use alcohol. He reports that he does not use drugs. Family History: family history is not on file. The patients home medications have been reviewed.     Allergies: Pcn [penicillins]    -------------------------------------------------- RESULTS -------------------------------------------------    LABS:  Results for orders placed or performed during the hospital encounter of 09/13/22   CBC with Auto Differential   Result Value Ref Range    WBC 33.6 (H) 4.5 - 11.5 E9/L    RBC 4.06 3.80 - 5.80 E12/L    Hemoglobin 12.0 (L) 12.5 - 16.5 g/dL    Hematocrit 35.3 (L) 37.0 - 54.0 %    MCV 86.9 80.0 - 99.9 fL    MCH 29.6 26.0 - 35.0 pg    MCHC 34.0 32.0 - 34.5 %    RDW 13.3 11.5 - 15.0 fL    Platelets 495 (H) 366 - 450 E9/L    MPV 8.7 7.0 - 12.0 fL    Neutrophils % 97.4 (H) 43.0 - 80.0 %    Lymphocytes % 1.7 (L) 20.0 - 42.0 %    Monocytes % 0.9 (L) 2.0 - 12.0 %    Eosinophils % 0.2 0.0 - 6.0 %    Basophils % 0.3 0.0 - 2.0 %    Neutrophils Absolute 32.59 (H) 1.80 - 7.30 E9/L    Lymphocytes Absolute 0.67 (L) 1.50 - 4.00 E9/L    Monocytes Absolute 0.34 0.10 - 0.95 E9/L    Eosinophils Absolute 0.00 (L) 0.05 - 0.50 E9/L    Basophils Absolute 0.00 0.00 - 0.20 E9/L    Poikilocytes 1+     Edwige Cells 1+    Comprehensive Metabolic Panel w/ Reflex to MG   Result Value Ref Range    Sodium 126 (L) 132 - 146 mmol/L    Potassium reflex Magnesium 3.8 3.5 - 5.0 mmol/L    Chloride 87 (L) 98 - 107 mmol/L    CO2 23 22 - 29 mmol/L    Anion Gap 16 7 - 16 mmol/L    Glucose 194 (H) 74 - 99 mg/dL    BUN 13 6 - 23 mg/dL    Creatinine 1.0 0.7 - 1.2 mg/dL    GFR Non-African American >60 >=60 mL/min/1.73    GFR African American >60     Calcium 9.4 8.6 - 10.2 mg/dL    Total Protein 8.5 (H) 6.4 - 8.3 g/dL    Albumin 3.6 3.5 - 5.2 g/dL    Total Bilirubin 1.0 0.0 - 1.2 mg/dL    Alkaline Phosphatase 100 40 - 129 U/L    ALT 15 0 - 40 U/L    AST 23 0 - 39 U/L   Lactic Acid   Result Value Ref Range    Lactic Acid 4.3 (HH) 0.5 - 2.2 mmol/L   Troponin   Result Value Ref Range    Troponin, High Sensitivity 48 (H) 0 - 11 ng/L   Urinalysis with Microscopic   Result Value Ref Range    Color, UA Yellow Straw/Yellow    Clarity, UA Clear Clear    Glucose, Ur Negative Negative mg/dL    Bilirubin Urine Negative Negative    Ketones, Urine Negative Negative mg/dL    Specific Gravity, UA 1.020 1.005 - 1.030    Blood, Urine LARGE (A) Negative    pH, UA 6.0 5.0 - 9.0    Protein,  (A) Negative mg/dL    Urobilinogen, Urine 1.0 <2.0 E.U./dL    Nitrite, Urine Negative Negative    Leukocyte Esterase, Urine Negative Negative    WBC, UA 1-3 0 - 5 /HPF    RBC, UA 0-1 0 - 2 /HPF    Epithelial Cells, UA NONE SEEN /HPF    Bacteria, UA RARE (A) None Seen /HPF   Troponin   Result Value Ref Range    Troponin, High Sensitivity 48 (H) 0 - 11 ng/L   Sedimentation Rate   Result Value Ref Range    Sed Rate 90 (H) 0 - 15 mm/Hr   C-Reactive Protein   Result Value Ref Range    CRP 24.9 (H) 0.0 - 0.4 mg/dL   Lactic Acid   Result Value Ref Range    Lactic Acid 4.1 (HH) 0.5 - 2.2 mmol/L   EKG 12 Lead   Result Value Ref Range    Ventricular Rate 104 BPM    Atrial Rate 104 BPM P-R Interval 170 ms    QRS Duration 80 ms    Q-T Interval 372 ms    QTc Calculation (Bazett) 489 ms    P Axis 20 degrees    R Axis 11 degrees    T Axis 38 degrees       RADIOLOGY:  XR FOOT LEFT (2 VIEWS)   Final Result   Marked soft tissue swelling in the dorsal aspect of the mid and distal foot. Slight interval improvement of soft tissue ulceration in the heel and   cortical irregularity along the posterior margin of the calcaneus. Question hairline fracture, proximal phalanx of 5th toe. Please correlate   with focal tenderness. XR CHEST 1 VIEW   Final Result   No acute process. There are multiple old left-sided rib fractures. EKG:  This EKG is signed and interpreted by me. Rate: 104  Rhythm: Sinus  Interpretation: non-specific EKG, no st elevation or depression  Comparison: changes compared to previous EKG      ------------------------- NURSING NOTES AND VITALS REVIEWED ---------------------------  Date / Time Roomed:  9/13/2022 12:00 PM  ED Bed Assignment:  19/19    The nursing notes within the ED encounter and vital signs as below have been reviewed.      Patient Vitals for the past 24 hrs:   BP Temp Temp src Pulse Resp SpO2 Height Weight   09/14/22 0339 (!) 90/46 -- -- 74 16 97 % 5' 11\" (1.803 m) 192 lb (87.1 kg)   09/14/22 0234 (!) 103/58 -- -- 98 18 98 % -- --   09/14/22 0015 (!) 105/58 -- -- 87 18 97 % -- --   09/13/22 2134 106/74 -- -- -- -- -- -- --   09/13/22 2000 (!) 114/40 -- -- 95 18 95 % -- --   09/13/22 1601 133/74 -- -- (!) 105 18 97 % -- --   09/13/22 1242 124/78 96.8 °F (36 °C) Infrared (!) 102 20 97 % -- 192 lb (87.1 kg)       Oxygen Saturation Interpretation: Normal    ------------------------------------------ PROGRESS NOTES ------------------------------------------  Re-evaluation(s):  Time: 7pm  Patients symptoms show no change  Repeat physical examination is not changed    Counseling:  I have spoken with the patient and discussed todays results, in addition to providing specific details for the plan of care and counseling regarding the diagnosis and prognosis. Their questions are answered at this time and they are agreeable with the plan of admission.    --------------------------------- ADDITIONAL PROVIDER NOTES ---------------------------------  Consultations:  Spoke with Dr. Jasmina Barros.  Discussed case. They will admit the patient. This patient's ED course included: a personal history and physicial examination, re-evaluation prior to disposition, multiple bedside re-evaluations, IV medications, and cardiac monitoring    This patient has remained hemodynamically stable during their ED course. Diagnosis:  1. Septicemia ([address]    2. Wound cellulitis    3. Lactic acidosis        Disposition:  Patient's disposition: Admit to tele  Patient's condition is stable.                   Melissa Kaba MD  09/14/22 7815

## 2022-09-14 NOTE — CONSULTS
Patient scheduled for emergent add on Left heel incision and drainage with podiatry service today between 2:30 to 5pm pending OR schedule. NPO since 0645 this AM. Septic with positive bactermia. Preliminary blood cultures growing GPC in chains. Wound cultures from left heel 8/29 grew Group A Strep, MSSA, and actinobacter. Positive cortical destruction of posterior calcaneus consistent with osteomyelitis.  This procedure will be performed for immediate source control       Full consult note to follow    D/W: Christopher Mckee DPM FACFAS  Fellowship-Trained Foot and Ankle Surgeon  Diplomate, [employer] Board of Foot and Ankle Surgeons  [phone]

## 2022-09-14 NOTE — CONSULTS
Podiatry Consult H&P  9/14/2022   [address] procedure: Left foot I&D, debridement to be performed by Dr. Luis Fernando Cotto on 9/14/22 after 3 PM to follow prior cases  -NPO since 10 PM last evening. Consulting Diagnosis: Infected Left heel wound  Consulting Physician: Dr. Sav Key    Chief Complaint: Chills fever fatigue     SUBJECTIVE: Clifton Clarke is a 60 y.o. point control insulin-dependent type 2 diabetic male who presented to the [address] [place] [address] ED on 9/13/2022 with a complaint of worsening wound, fevers, chills, fatigue. Podiatry was consulted on 9/14/2022 evaluation management of a infected left necrotic heel ulcer. Patient appears to be providing conflicting reports on history of present illness, states that he saw podiatrist Dr. Luis Fernando Cotto 2 days ago according to ED note however states today that he has not seen Dr. Luis Fernando Cotto for 1 week. The wound is longstanding for greater than several months however states that within the last week he has noted some increased redness and pain to the left lower extremity. He did not present for any medical management until last evening at the ED. He does endorse some fevers and chills that he states have been ongoing for the last 3 days. He denies any active drainage or wound. He reports a normal appetite however states he has not eaten since 10 PM last evening. He denies any shortness of breath, chest pain. He does endorse some calf pain to the left posterior muscle group. Patient states that he had a angiogram with stent placement 2 months prior at [facility] to the left lower extremity. He states that he has not eaten or drank anything since 10 PM last night. There is n.p.o. orders from 0645 this AM.    On presentation to the ED patient's white count was 33,000. Has since trended down to 25,900.   X-ray of the left foot and ankle obtained revealing osteomyelitis and posterior aspect of the calcaneal tubercle with no subcutaneous emphysema or foreign bodies. On presentation to the ED the patient's heart rate was 102 with no increased temperature. There are positive blood cultures growing gram-positive cocci in chains. Of note patient's most recent wound culture from the left heel grew group A strep, MSSA, actinobacter. 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 Rehana Pemberton Left 2021    LEFT FOOT DEBRIDEMENT WITH BONE BIOPSY, WOUND VAC APPLICATION performed by Zenon Ortiz DPM at [address]    TRANSESOPHAGEAL ECHOCARDIOGRAM N/A 2021    TRANSESOPHAGEAL ECHOCARDIOGRAM WITH BUBBLE STUDY performed by Lissa Kramer MD at [place] [address] N/A 2021    EGD BIOPSY performed by Hiral Samson DO at [address] [place] [address] reviewed. No pertinent family history.      Social History     Tobacco Use    Smoking status: Former     Packs/day: 1.00     Years: 25.00     Pack years: 25.00     Types: Cigarettes     Quit date: 2015     Years since quittin.7    Smokeless tobacco: Never   Substance Use Topics    Alcohol use: Not Currently     Comment: Former every day drinker for most of adult life        Prior to Admission medications    Medication Sig Start Date End Date Taking? Authorizing Provider   cyclobenzaprine (FLEXERIL) 5 MG tablet take 1 tablet by mouth nightly if needed for muscle spasm 8/26/22   Ric Leach MD   glimepiride (AMARYL) 2 MG tablet Take 1 tablet by mouth every morning (before breakfast) 8/23/22   Ric Leach MD   metFORMIN (GLUCOPHAGE) 500 MG tablet Take 1 tablet by mouth in the morning and 1 tablet in the evening. Take with meals. 7/21/22   Ric Leach MD   atorvastatin (LIPITOR) 40 MG tablet Take 1 tablet by mouth nightly 7/21/22   Ric Leach MD   ibuprofen (ADVIL;MOTRIN) 200 MG tablet Take 200 mg by mouth every 6 hours as needed for Pain    Historical Provider, MD   gabapentin (NEURONTIN) 100 MG capsule Take 3 capsules by mouth 2 times daily for 30 days.  6/22/22 9/12/22  Ric Leach MD   hydroCHLOROthiazide (HYDRODIURIL) 25 MG tablet Take 1 tablet by mouth daily 6/17/22   Ric Leach MD   diphenhydrAMINE (BENADRYL) 25 MG capsule Take 25 mg by mouth every 6 hours as needed for Itching    Historical Provider, MD   B Complex-C-E-Zn (STRESS B/ZINC) TABS Take 1 tablet by mouth daily    Historical Provider, MD   acetaminophen (TYLENOL) 325 MG tablet Take 650 mg by mouth every 6 hours as needed for Pain    Historical Provider, MD   ferrous sulfate (IRON 325) 325 (65 Fe) MG tablet Take 325 mg by mouth daily (with breakfast)    Historical Provider, MD   vitamin D (ERGOCALCIFEROL) 1.25 MG (34313 UT) CAPS capsule Take 1 capsule by mouth once a week 1/12/21   Ranjeet Manjarrez MD   melatonin 3 MG TABS tablet Take 9 mg by mouth nightly as needed    Historical Provider, MD   aspirin 81 MG EC tablet Take 81 mg by mouth daily    Historical Provider, MD   clopidogrel (PLAVIX) 75 MG tablet Take 1 tablet by mouth daily 11/16/15 Gunjan Conde, DO        Pcn [penicillins]         OBJECTIVE:        Vitals:    09/14/22 0730   BP: (!) 105/51   Pulse: 97   Resp: 18   Temp:    SpO2: 98%          NEUROLOGICAL EXAMINATION: Protective sensation absent to bilateral lower extremities. Epicritic sensation is intact. VASCULAR EXAMINATION:  DP and PT pulses are weakly palpable +1. Capillary refill is less than five seconds at the toes. Skin temperature is warm to cool proximal to distal.  There is absence of hair growth noted bilaterally. DERMATOLOGICAL:  There is full-thickness ulceration in the posterior left heel measuring approximately 4.5x6x0.5cm. Does probe to bone post-operatively. There is mild sanguinous drainage in canister. Mild surrounding erythema. No malodor, purulence, or fluctuance. Maceration plantarly. On the right heel, a granular wound, appears to be stable. Measures approximately 1.5cm in diameter and down to subQ tissue. No signs of infection. Mild erythema. MUSCULOSKELETAL:  Muscle strength is 3/5 to the foot and leg muscle groups on the left side, 4/5 on the right. No pain on compression of calves. No clinical signs of DVT. Severe pain on palpation of heels. Left LE involuntary contractions. EXAM:        Pt is AAOx3    Vascular Exam: DP and PT pulses palpable plus 1 out of 4 right lower extremity and nonpalpable left lower extremity secondary to diffuse nonpitting edema left foot, ankle, lower leg. The skin temperature is warm to hot from proximal lower leg to distal digits with increase in warmth of the periwound tissue and posterior calf of the left lower extremity. There is diffuse erythema to the left lower extremity extending from the sulci of the toes proximally to the proximal one third of the tibia. There is an absence of normal skin wrinkles to the left heel, dorsal forefoot.     Neuro Exam: Diminished protective sensation bilaterally, gross sensation faintly intact with positive tenderness palpation to periwound tissue. Dermatologic Exam: No other open wounds or lesions to bilateral lower extremity.  -Full-thickness ulcer down to level of exposed bone left posterior calcaneus measuring 3.1 cm x 4.5cm x 1.2 centimeters with positive probe to bone. 0.02 cm undermining proximal aspect of the wound with exposed Achilles tendon. Negative crepitations, negative fluctuance, superficial fibronecrotic slough, no malodor, diffuse erythema left lower extremity with indurated posterior calf. No tunneling.  - Positive fluctuance of the left dorsal forefoot likely related to edema. MSK: 5/5 muscle strength optimal prescription bilaterally. Intact Achilles range of motion with normal Macias test LLE. Positive tenderness palpation left posterior heel. Indurated posterior muscle group left lower extremity with tenderness on posterior squeeze. Dorsiflex calcaneus left lower extremity with increased cavus midfoot.     Current Facility-Administered Medications   Medication Dose Route Frequency Provider Last Rate Last Admin    gabapentin (NEURONTIN) capsule 300 mg  300 mg Oral BID Alice Marley MD   300 mg at 09/14/22 0931    clopidogrel (PLAVIX) tablet 75 mg  75 mg Oral Daily Alice Marley MD   75 mg at 09/14/22 0931    aspirin EC tablet 81 mg  81 mg Oral Daily Alice Marley MD   81 mg at 09/14/22 0931    melatonin tablet 9 mg  9 mg Oral Nightly PRN Alice Marley MD        diphenhydrAMINE (BENADRYL) tablet 25 mg  25 mg Oral Q6H PRN Alice Marley MD        atorvastatin (LIPITOR) tablet 40 mg  40 mg Oral Nightly Alice Marley MD        cyclobenzaprine (FLEXERIL) tablet 10 mg  10 mg Oral Nightly Alice Marley MD        glucose chewable tablet 16 g  4 tablet Oral PRN Alice Marley MD        dextrose bolus 10% 125 mL  125 mL IntraVENous PRN Alice Marley MD        Or    dextrose bolus 10% 250 mL  250 mL IntraVENous PRN Alice Marley MD        glucagon (rDNA) injection 1 mg  1 mg chains  -Peripheral arterial disease s/p arteriogram with angioplasty    PLAN:  - Examined and evaluated  - All labs, imaging, and charts reviewed  - WBC: 25.9 trending down  -Wound culture left heel 9/13/2022: Pending  -X-ray left foot 9/13/22: Positive osteomyelitis posterior calcaneal tuber with marked soft tissue swelling dorsal aspect of mid and distal forefoot. Questionable hairline fracture proximal phalanx of digit.  -Plan procedure: Left foot I&D, debridement to be performed by Dr. Dang Oneil on 9/14/22 after 3 PM to follow prior cases  -NPO since 10 PM last evening.  -Nonweightbearing left lower extremity.  -Dressing: Betadine wet-to-dry for now    D/W: Karla Soares DPM FACFAS  Fellowship-Trained Foot and Ankle Surgeon  Diplomate, American Board of Foot and Ankle Surgeons  [phone]       Thank you for involving podiatry in this patients care. Please do not hesitate to call with any questions or concerns.      Julius Andres Podiatry PGY3  9/14/2022   11:58 AM

## 2022-09-14 NOTE — PROGRESS NOTES
Pharmacy Consultation Note  (Antibiotic Dosing and Monitoring)    Initial consult date: 9/14/22  Consulting physician/provider: Archana Gottlieb MD  Drug: Vancomycin  Indication: Skin and Soft Tissue Infections    Age/  Gender Height Weight IBW  Allergy Information   62 y.o./male 5' 11\" (180.3 cm) 192 lb (87.1 kg)     Ideal body weight: 75.3 kg (166 lb 0.1 oz)  Adjusted ideal body weight: 80 kg (176 lb 6.5 oz)   Pcn [penicillins]      Renal Function:  Recent Labs     09/13/22  1329 09/14/22  0634   BUN 13 16   CREATININE 1.0 0.9       Intake/Output Summary (Last 24 hours) at 9/14/2022 0932  Last data filed at 9/14/2022 0236  Gross per 24 hour   Intake 1049 ml   Output 200 ml   Net 849 ml       Vancomycin Monitoring:  Trough:  No results for input(s): VANCOTROUGH in the last 72 hours. Random:  No results for input(s): VANCORANDOM in the last 72 hours. No results for input(s): Gerhardt Hones in the last 72 hours. Historical Cultures:  Organism   Date Value Ref Range Status   08/29/2022 BHS Group A (Strep pyogenes) (A)  Final   08/29/2022 Staphylococcus aureus (A)  Final   08/29/2022 Acinetobacter baumannii (A)  Final     No results for input(s): BC in the last 72 hours. Vancomycin Administration Times:  Recent vancomycin administrations        No vancomycin IV orders with administrations found. Orders not given:            vancomycin (VANCOCIN) 1,750 mg in dextrose 5 % 500 mL IVPB                    Assessment:  Patient is a 60 y.o. male who has been initiated on vancomycin  Estimated Creatinine Clearance: 89 mL/min (based on SCr of 0.9 mg/dL).   To dose vancomycin, pharmacy will be utilizing [employer] calculation software for goal AUC/ANNELIESE 400-600 mg/L-hr    Plan:  Vancomycin 1750 mg once, then Vancomycin 1000 mg IV every 12 hours (Est AUC/ANNELIESE 481 mg/L-hr)  Will check vancomycin trough tomorrow morning  Will continue to monitor renal function   Clinical pharmacy to follow      Josee Hercules [facility] 9/14/2022 9:32 AM

## 2022-09-14 NOTE — ED NOTES
Pt placed on a 3-lead telemetry, however, it is unable to print. Currently shows SR with a rate of 98.       Marcin March 09/14/22 0234

## 2022-09-14 NOTE — CARE COORDINATION
SS Note: No Covid test. Pt presented for Chills, fatigue, fever and worsening of wound in left foot. Pt has hx of 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 2 diabetes mellitus, with necrosis of bone. Pt on IV Cefepime + Vancomycin for Sepsis from infected foot wound. Anticipate surgery for wound 9/13. SW met w/pt in ED 04 for transition of care planning. Spoke from door wearing mask/PPE and explained role. Pt is single & has no children. He lives with his brother. He has a cane- and uses this due to a stroke in 2015. PTA, pt is independent in IADL's/ADL's, drives occasionally and has insurance. He uses [employer]  [phone] for medical appt. PCP is [address] [place] [address]. Pt has following DME: cane, w/c, Shower chair, glucometer. Pt currently active w/[facility] but name of agency is unknown. Pt has past hx TALYA w/[facility]. If he needs [address] is his 1st choice. No hx of 02. SW completed ACP w/pt. SW offered to complete POA w/pt but he declined. SW informed pt that until he completes POA- his mother would be legal decision maker; although he wants his brother to do this.    Electronically signed by ANGELO Thmoas on 9/14/2022 at 11:40 AM

## 2022-09-14 NOTE — BRIEF OP NOTE
Brief Postoperative Note      Patient: Migue Diamond  YOB: 1960  MRN: [id number]    Date of Procedure: 9/14/2022    Pre-Op Diagnosis: Acute hematogenous osteomyelitis of left foot ([address] [M86.072]    Post-Op Diagnosis: Same       Procedure(s):  LEFT FOOT DEBRIDEMENT INCISION AND DRAINAGE    Surgeon(s):  Rober Bellamy DPM    Assistant:  * No surgical staff found *    Anesthesia: Monitor Anesthesia Care    Estimated Blood Loss (mL): Minimal    Complications: None    Specimens:   ID Type Source Tests Collected by Time Destination   1 : CULTURE TISSUE LEFT FOOT  Tissue Tissue CULTURE, ANAEROBIC, CULTURE, FUNGUS, GRAM STAIN, CULTURE, SURGICAL, CULTURE WITH SMEAR, ACID FAST BACILLIUS Rober Harrell DPM 9/14/2022 1641    A : TISSUE LEFT FOOT  Tissue Tissue SURGICAL PATHOLOGY Major Carrel, DPM 9/14/2022 1642        Implants:  * No implants in log *      Drains:   Urinary Catheter 09/14/22 Cole (Active)   Site Assessment Swelling 09/14/22 1315   Urine Color Yellow 09/14/22 1315   Urine Appearance Clear 09/14/22 1315   Collection Container Standard 09/14/22 1315   Catheter Best Practices  Drainage tube clipped to bed;Catheter secured to thigh; Bag below bladder;Bag not on floor; Lack of dependent loop in tubing;Drainage bag less than half full 09/14/22 1315   Status Draining 09/14/22 1315       Findings: Over 10 cc of purulent drainage.   Necrosis of calcaneal bone    Electronically signed by Major Carrel, DPM on 9/14/2022 at 4:58 PM

## 2022-09-14 NOTE — ED NOTES
Patient very upset he is in emergency room waiting for a bed. Explained to patient that as soon as we have one will get him upstairs, he is upset because it is too loud and he can not get any rest. Apologized for patient inconvenience, will continue to monitor.       Radha Wilson RN  09/14/22 7216

## 2022-09-14 NOTE — ACP (ADVANCE CARE PLANNING)
Advance Care Planning     Advance Care Planning Activator (Inpatient)  Conversation Note      Date of ACP Conversation: 9/14/2022     Conversation Conducted with: Patient with Decision Making Capacity    ACP Activator: Keesha Webber [facility] Decision Maker:     Current Designated Health Care Decision Maker:     Primary Decision Maker: Yimi Steel - Brother/Sister - [phone]  Click here to complete Healthcare Decision Makers including section of the Healthcare Decision Maker Relationship (ie \"Primary\")  Today we documented desired Decision Maker(s), who is (are) NOT Legal Next of Sebastian Perez. ACP documents are required for decision maker authority. SW offered to complete POA w/pt but he declined. SW informed pt that until he completes POA- his mother would be legal decision maker. Care Preferences    Ventilation: \"If you were in your present state of health and suddenly became very ill and were unable to breathe on your own, what would your preference be about the use of a ventilator (breathing machine) if it were available to you? \"      Would the patient desire the use of ventilator (breathing machine)?: yes    \"If your health worsens and it becomes clear that your chance of recovery is unlikely, what would your preference be about the use of a ventilator (breathing machine) if it were available to you? \"     Would the patient desire the use of ventilator (breathing machine)?: Yes      Resuscitation  \"CPR works best to restart the heart when there is a sudden event, like a heart attack, in someone who is otherwise healthy. Unfortunately, CPR does not typically restart the heart for people who have serious health conditions or who are very sick. \"    \"In the event your heart stopped as a result of an underlying serious health condition, would you want attempts to be made to restart your heart (answer \"yes\" for attempt to resuscitate) or would you prefer a natural death (answer \"no\" for do not attempt to resuscitate)? \" yes       [] Yes   [x] No   Educated Patient / Froilan Marcos regarding differences between Advance Directives and portable DNR orders.     Length of ACP Conversation in minutes:  11    Conversation Outcomes:  [x] ACP discussion completed  [] Existing advance directive reviewed with patient; no changes to patient's previously recorded wishes  [] New Advance Directive completed  [] Portable Do Not Rescitate prepared for Provider review and signature  [] POLST/POST/MOLST/MOST prepared for Provider review and signature      Follow-up plan:    [] Schedule follow-up conversation to continue planning  [] Referred individual to Provider for additional questions/concerns   [] Advised patient/agent/surrogate to review completed ACP document and update if needed with changes in condition, patient preferences or care setting    [x] This note routed to one or more involved healthcare providers

## 2022-09-14 NOTE — CONSULTS
4.1  Cxry nap   Left foot Marked soft tissue swelling in the dorsal aspect of the mid and distal foot. Slight interval improvement of soft tissue ulceration in the heel and   cortical irregularity along the posterior margin of the calcaneus. Question hairline fracture, proximal phalanx of 5th toe. Please correlate   with focal tenderness. Currently on     vancomycin (VANCOCIN) 1,750 mg in dextrose 5 % 500 mL IVPB, Once  cefepime (MAXIPIME) 2,000 mg in sodium chloride 0.9 % 50 mL IVPB ([id number]), Q12H  sulfamethoxazole-trimethoprim (BACTRIM DS;SEPTRA DS) 800-160 MG per tablet 2 tablet, 2 times per day     Pt was seen in ER wbc33.6->25.9 cr0.9   Has left heel ulcer chronic  Has not seen ID for awhile   Heel redressed      REVIEW OF SYSTEMS     CONSTITUTIONAL:    fever, chills, no weight loss  ALLERGIES:    No rash or itch  EYES:     No visual changes  ENT:      No  hearing loss or sore throat  CARDIOVASCULAR:   No chest pain or palpitations  RESPIRATORY:   No cough, sob  ENDOCRINE:    No increase thirst, urination   HEME-LYMPH:   No easy bruising or bleeding  GI:     No nausea, vomiting or diarrhea  :     No urinary complaints  NEURO:    No seizures, stroke, HA  MUSCULOSKELETAL:  No muscle aches or pain, no joint pain  SKIN:     No rash ulcers or wounds  PSYCH:    No depression or anxiety    Not in a hospital admission.   CURRENT MEDICATIONS     Current Facility-Administered Medications:     gabapentin (NEURONTIN) capsule 300 mg, 300 mg, Oral, BID, Alice Marley MD    clopidogrel (PLAVIX) tablet 75 mg, 75 mg, Oral, Daily, Alice Marley MD    aspirin EC tablet 81 mg, 81 mg, Oral, Daily, Alice Marley MD    melatonin tablet 9 mg, 9 mg, Oral, Nightly PRN, Alice Marley MD    diphenhydrAMINE (BENADRYL) tablet 25 mg, 25 mg, Oral, Q6H PRN, Alice Marley MD    atorvastatin (LIPITOR) tablet 40 mg, 40 mg, Oral, Nightly, Alessandro Walters MD    cyclobenzaprine (FLEXERIL) tablet 10 mg, 10 mg, Oral, Nightly, Vanessa Arizmendi MD    glucose chewable tablet 16 g, 4 tablet, Oral, PRN, Vanessa Arizmendi MD    dextrose bolus 10% 125 mL, 125 mL, IntraVENous, PRN **OR** dextrose bolus 10% 250 mL, 250 mL, IntraVENous, PRN, Vanessa Arizmendi MD    glucagon (rDNA) injection 1 mg, 1 mg, SubCUTAneous, PRN, Vanessa Arizmendi MD    dextrose 10 % infusion, , IntraVENous, Continuous PRN, Vanessa Arizmenid MD    insulin lispro (HUMALOG) injection vial 0-4 Units, 0-4 Units, SubCUTAneous, Q4H, Vanessa Arizmendi MD    0.9 % sodium chloride infusion, , IntraVENous, Continuous, Alessandro Walters MD    0.9 % sodium chloride bolus, 1,000 mL, IntraVENous, Once, Vanessa Arizmendi MD    vancomycin (VANCOCIN) 1,750 mg in dextrose 5 % 500 mL IVPB, 20 mg/kg, IntraVENous, Once, Vanessa Arizmendi MD    cefepime (MAXIPIME) 2,000 mg in sodium chloride 0.9 % 50 mL IVPB (Gilbertowagner Duggan), 2,000 mg, IntraVENous, Q12H, Vanessa Arizmendi MD, Last Rate: 12.5 mL/hr at 09/14/22 0635, 2,000 mg at 09/14/22 5201    sulfamethoxazole-trimethoprim (BACTRIM DS;SEPTRA DS) 800-160 MG per tablet 2 tablet, 2 tablet, Oral, 2 times per day, Vanessa Arizmendi MD, 2 tablet at 09/14/22 0019    sodium chloride flush 0.9 % injection 10 mL, 10 mL, IntraVENous, 2 times per day, Vanessa Arizmendi MD, 10 mL at 09/14/22 0018    sodium chloride flush 0.9 % injection 10 mL, 10 mL, IntraVENous, PRN, Vanessa Arizmendi MD    0.9 % sodium chloride infusion, , IntraVENous, PRN, Vanessa Arizmendi MD    enoxaparin (LOVENOX) injection 40 mg, 40 mg, SubCUTAneous, Daily, Vanessa Arizmendi MD    promethazine (PHENERGAN) tablet 12.5 mg, 12.5 mg, Oral, Q6H PRN **OR** ondansetron (ZOFRAN) injection 4 mg, 4 mg, IntraVENous, Q6H PRN, Vanessa Arizmendi MD    polyethylene glycol (GLYCOLAX) packet 17 g, 17 g, Oral, Daily PRN, Vanessa Arizmendi MD    acetaminophen (TYLENOL) tablet 650 mg, 650 mg, Oral, Q6H PRN **OR** acetaminophen (TYLENOL) suppository 650 mg, 650 mg, Rectal, Q6H PRN, Vanessa Arizmendi MD    docusate sodium (COLACE) capsule 100 mg, 100 mg, Oral, Once, Kobe Agudelo MD    Current Outpatient Medications:     cyclobenzaprine (FLEXERIL) 5 MG tablet, take 1 tablet by mouth nightly if needed for muscle spasm, Disp: 30 tablet, Rfl: 2    glimepiride (AMARYL) 2 MG tablet, Take 1 tablet by mouth every morning (before breakfast), Disp: 30 tablet, Rfl: 2    metFORMIN (GLUCOPHAGE) 500 MG tablet, Take 1 tablet by mouth in the morning and 1 tablet in the evening. Take with meals. , Disp: 60 tablet, Rfl: 3    atorvastatin (LIPITOR) 40 MG tablet, Take 1 tablet by mouth nightly, Disp: 30 tablet, Rfl: 3    ibuprofen (ADVIL;MOTRIN) 200 MG tablet, Take 200 mg by mouth every 6 hours as needed for Pain, Disp: , Rfl:     gabapentin (NEURONTIN) 100 MG capsule, Take 3 capsules by mouth 2 times daily for 30 days. , Disp: 60 capsule, Rfl: 2    hydroCHLOROthiazide (HYDRODIURIL) 25 MG tablet, Take 1 tablet by mouth daily, Disp: 30 tablet, Rfl: 2    diphenhydrAMINE (BENADRYL) 25 MG capsule, Take 25 mg by mouth every 6 hours as needed for Itching, Disp: , Rfl:     B Complex-C-E-Zn (STRESS B/ZINC) TABS, Take 1 tablet by mouth daily, Disp: , Rfl:     acetaminophen (TYLENOL) 325 MG tablet, Take 650 mg by mouth every 6 hours as needed for Pain, Disp: , Rfl:     ferrous sulfate (IRON 325) 325 (65 Fe) MG tablet, Take 325 mg by mouth daily (with breakfast), Disp: , Rfl:     vitamin D (ERGOCALCIFEROL) 1.25 MG (78965 UT) CAPS capsule, Take 1 capsule by mouth once a week, Disp: 5 capsule, Rfl: 0    melatonin 3 MG TABS tablet, Take 9 mg by mouth nightly as needed, Disp: , Rfl:     aspirin 81 MG EC tablet, Take 81 mg by mouth daily, Disp: , Rfl:     clopidogrel (PLAVIX) 75 MG tablet, Take 1 tablet by mouth daily, Disp: 30 tablet, Rfl: 3  ALLERGIES     Pcn [penicillins]  Immunization History   Administered Date(s) Administered    COVID-19, J&J, (age 18y+), IM, 0.5 mL 04/01/2021      Internal Administration   First Dose COVID-19, J&J, (age 18y+), IM, 0.5 mL  04/01/2021   Second Dose           Last COVID Lab SARS-CoV-2, PCR (no units)   Date Value   02/19/2021 Not Detected     SARS-CoV-2, NAAT (no units)   Date Value   01/04/2021 Not Detected          PAST MEDICAL HISTORY     Past Medical History:   Diagnosis Date    Arthritis     Bilateral carpal tunnel syndrome 2016    Cerebral artery occlusion with cerebral infarction [facility])     Chronic back pain     CVA (cerebral vascular accident) ([address] 11/2015, 2017    Diabetes mellitus ([address]     Type 2    Diabetic foot ulcer with osteomyelitis ([address] 12/21/2021    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 2 diabetes mellitus, with necrosis of bone ([address] 12/21/2021    Hemiparesis affecting left side as late effect of cerebrovascular accident ([address]     LEFT SIDE NON DOMINANT FOLLOWING STROKE    Hyperlipidemia     Hypertension     Peripheral vascular angioplasty status 12/21/2021    Ulcer of left heel, with necrosis of bone ([address] 12/27/2021    Ulcer of left heel, with necrosis of muscle ([address] 12/21/2021    Vitamin D deficiency      SURGICAL HISTORY       Past Surgical History:   Procedure Laterality Date    CARPAL TUNNEL RELEASE  10/01/2016    Dr. Meagan Mendoza Left 2/16/2021    LEFT FOOT DEBRIDEMENT WITH BONE BIOPSY, WOUND VAC APPLICATION performed by Bee Nina DPM at [address]    TRANSESOPHAGEAL ECHOCARDIOGRAM N/A 2/22/2021    TRANSESOPHAGEAL ECHOCARDIOGRAM WITH BUBBLE STUDY performed by Chet Hudson MD at [address] N/A 1/4/2021    EGD BIOPSY performed by Holger Pelaez DO at [address]     History reviewed. No pertinent family history.   SOCIAL HISTORY       Social History     Socioeconomic History    Marital status: Single     Spouse name: None    Number of children: None    Years of education: None    Highest education level: None   Tobacco Use    Smoking status: Former     Packs/day: 1.00 Years: 25.00     Pack years: 25.00     Types: Cigarettes     Quit date: 2015     Years since quittin.7    Smokeless tobacco: Never   Vaping Use    Vaping Use: Never used   Substance and Sexual Activity    Alcohol use: Not Currently     Comment: Former every day drinker for most of adult life    Drug use: No     Social Determinants of Health     Financial Resource Strain: Low Risk     Difficulty of Paying Living Expenses: Not hard at all   Food Insecurity: No Food Insecurity    Worried About [address] [facility] in the Last Year: Never true    [facility] in the Last Year: Never true     PHYSICAL EXAM       ED Triage Vitals   BP Temp Temp Source Heart Rate Resp SpO2 Height Weight   22 1242 22 1242 22 1242 22 1242 22 1242 22 1242 22 0339 22 1242   124/78 96.8 °F (36 °C) Infrared (!) 102 20 97 % 5' 11\" (1.803 m) 192 lb (87.1 kg)     Vitals:    Vitals:    22 0234 22 0339 22 0640 22 0730   BP: (!) 103/58 (!) 90/46 (!) 78/41 (!) 105/51   Pulse: 98 74 97 97   Resp: 18 16  18   Temp:       TempSrc:       SpO2: 98% 97% 94% 98%   Weight:  192 lb (87.1 kg)     Height:  5' 11\" (1.803 m)       Physical Exam   Constitutional/General: Alert and oriented, NAD  Head: NC/AT  Eyes: PERRL, EOMI  Mouth:  no thrush   Neck: Supple   Pulmonary: Lungs clear to auscultation bilaterally. Not in respiratory distress  Cardiovascular:  Regular rate and rhythm, no murmurs, gallops, or rubs. Abdomen: Soft, + BS. No distension. Nontender. Extremities: Moves all extremities x 4.    Warm and well perfused left le edema erythema   Wound was redressed in ER               Pulses:  Distal pulses dec   Skin: Warm and dry    Neurologic:    No focal deficits  Psych: Normal Affect           Peripheral Intravenous Line:  Peripheral IV 22 Right Antecubital (Active)        Drain(s):  Urinary Catheter 22 Cole (Active)          DIAGNOSTIC RESULTS   RADIOLOGY: XR FOOT LEFT (2 VIEWS)    Result Date: 9/13/2022  EXAMINATION: TWO XRAY VIEWS OF THE LEFT FOOT 9/13/2022 2:13 pm COMPARISON: August 8, 2022 HISTORY: ORDERING SYSTEM PROVIDED HISTORY: pain wound TECHNOLOGIST PROVIDED HISTORY: Reason for exam:->pain wound FINDINGS: The bones are demineralized. Mild cortical irregularity at the base of the proximal phalanx of the 5th toe. There is deformity of the hindfoot, either posttraumatic or developmental.  There is marked soft tissue swelling, especially in the dorsal aspect of the mid and distal foot. There is soft tissue irregularity posterior to the calcaneus and mild cortical irregularity of the posterior margin of the calcaneus. Marked soft tissue swelling in the dorsal aspect of the mid and distal foot. Slight interval improvement of soft tissue ulceration in the heel and cortical irregularity along the posterior margin of the calcaneus. Question hairline fracture, proximal phalanx of 5th toe. Please correlate with focal tenderness. XR CHEST 1 VIEW    Result Date: 9/13/2022  EXAMINATION: ONE XRAY VIEW OF THE CHEST 9/13/2022 2:12 pm COMPARISON: None. HISTORY: ORDERING SYSTEM PROVIDED HISTORY: pna TECHNOLOGIST PROVIDED HISTORY: Reason for exam:->pna FINDINGS: The lungs are without acute focal process. There is no effusion or pneumothorax. The cardiomediastinal silhouette is without acute process. The osseous structures are without acute process. No acute process. There are multiple old left-sided rib fractures. MRI FOOT LEFT WO CONTRAST    Result Date: 8/23/2022  EXAMINATION: MRI OF THE LEFT FOOT WITHOUT CONTRAST, 8/22/2022 3:56 pm TECHNIQUE: Multiplanar multisequence MRI of the left foot was performed without the administration of intravenous contrast. COMPARISON: Left foot radiograph dated 08/08/2022.  HISTORY: ORDERING SYSTEM PROVIDED HISTORY: Diabetic foot infection ([address] TECHNOLOGIST PROVIDED HISTORY: Reason for exam:->rule out osteomyelitis FINDINGS: Evaluation is partially limited due to patient motion artifact on some sequences. There is soft tissue defect/ulceration posterior lateral to the calcaneus measuring approximately 3.0 x 3.5 cm. There is adjacent skin thickening and loss of normal subcutaneous fat signal intensity reflecting underlying cellulitis. The infectious/inflammatory changes extend deep to the level of the bone with abnormal morphology and abnormal bone marrow signal intensity indicated by increased T2 signal intensity and loss of normal T1 hyper signal intensity of posterior aspect of the calcaneus reflecting underlying osteomyelitis. Evaluation of the soft tissue structures is limited due to patient motion artifact. Within the limitations of the study, there is indistinctness of lateral aspect of distal Achilles tendon, reflecting underlying tendinosis/tearing, which may be infectious in origin. The anterior talofibular ligament appears indistinct, suspicious for tearing. There is abnormal morphology of peroneus brevis tendon as it courses posterior and beneath the lateral malleolus likely reflecting longitudinal split tearing. There is a mild-to-moderate tenosynovitis affecting the peroneal tendons. Large soft tissue defect/ulceration overlying the posterolateral aspect of the calcaneus with adjacent cellulitis. The infectious/inflammatory changes extend deep to the level of the bone with abnormal bone marrow signal intensity seen within the posterior aspect of the calcaneus with loss of normal T1 hyper signal intensity reflecting underlying osteomyelitis. Tendinosis and tearing of the lateral aspect of the distal Achillis tendon, which may be infectious in origin. Other findings as above.      LABS  Recent Labs     09/13/22 1329 09/14/22  0634   WBC 33.6* 25.9*   HGB 12.0* 9.8*   HCT 35.3* 29.3*   MCV 86.9 86.7   * 300     Recent Labs     09/13/22  1329 09/14/22  0632 09/14/22  0634   *  --  127*   K 3.8 --  3.6   CL 87*  --  93*   CO2 23  --  21*   BUN 13  --  16   CREATININE 1.0  --  0.9   GFRAA >60  --  >60   LABGLOM >60  --  >60   GLUCOSE 194* 154 143*   PROT 8.5*  --  6.4   LABALBU 3.6  --  2.9*   CALCIUM 9.4  --  7.9*   BILITOT 1.0  --  0.6   ALKPHOS 100  --  85   AST 23  --  31   ALT 15  --  14     No results for input(s): PROCAL in the last 72 hours.   Lab Results   Component Value Date    CRP 24.9 (H) 09/13/2022    CRP 2.3 (H) 02/14/2021     Lab Results   Component Value Date    SEDRATE 90 (H) 09/13/2022    SEDRATE 87 (H) 02/14/2021     No results found for: [id number]  Lab Results   Component Value Date/Time    COVID19 Not Detected 02/19/2021 04:15 PM          Lab Results   Component Value Date/Time    COVID19 Not Detected 02/19/2021 04:15 PM     COVID-19/TALYA-COV2 LABS  Recent Labs     09/13/22  1329 09/13/22  1459 09/14/22  0634   CRP  --  24.9*  --    AST 23  --  31   ALT 15  --  14     Lab Results   Component Value Date/Time    CHOL 164 03/01/2016 08:10 AM    TRIG 170 03/01/2016 08:10 AM    HDL 43 03/01/2016 08:10 AM    LDLCALC 87 03/01/2016 08:10 AM    LABVLDL 34 03/01/2016 08:10 AM         Lab Results   Component Value Date    HEPAIGM Non-Reactive 12/28/2020    HEPBIGM Non-Reactive 12/28/2020    HCVABI Non-Reactive 12/28/2020     Hep C Ab Interp   Date Value Ref Range Status   12/28/2020 Non-Reactive NON REACT Final          MICROBIOLOGY:          Lab Results   Component Value Date/Time    BC 5 Days no growth 02/16/2021 04:37 PM    BC Previously positive blood culture called 02/14/2021 07:07 PM    BC  02/14/2021 07:07 PM     Refer to previous culture for susceptibility results  of CXBL2 (LAC) collected 02/14/2021 at 19:07      ORG BHS Group A (Strep pyogenes) 08/29/2022 02:00 PM    ORG Staphylococcus aureus 08/29/2022 02:00 PM    ORG Acinetobacter baumannii 08/29/2022 02:00 PM     Lab Results   Component Value Date/Time    BLOODCULT2 5 Days no growth 02/16/2021 04:37 PM    BLOODCULT2  02/14/2021 07:07 PM     This isolate is presumed to be resistant based on the  detection of inducible Clindamycin resistance. Clindamycin  may still be effective in some patients. BLOODCULT2 5 Days no growth 12/26/2020 02:49 AM    ORG BHS Group A (Strep pyogenes) 08/29/2022 02:00 PM    ORG Staphylococcus aureus 08/29/2022 02:00 PM    ORG Acinetobacter baumannii 08/29/2022 02:00 PM       WOUND/ABSCESS   Date Value Ref Range Status   08/29/2022 Moderate growth  Final   08/29/2022   Final    Moderate growth  This isolate is presumed to be resistant based on the  detection of inducible Clindamycin resistance. Clindamycin  may still be effective in some patients. 08/29/2022 Moderate growth  Final       Smear, Respiratory   Date Value Ref Range Status   01/01/2021   Final    Group 6: <25 PMN's/LPF and <25 Epithelial cells/LPF  Rare Polymorphonuclear leukocytes  Epithelial cells not seen  No organisms seen           Component Value Date/Time    AFBCX No growth after 6 weeks of incubation. 02/16/2021 0732    AFBCX No growth after 6 weeks of incubation. 02/16/2021 0730    FUNGSM No Fungal elements seen 02/16/2021 0732    LABFUNG No growth after 4 weeks of incubation. 02/16/2021 0732    LABFUNG No growth after 4 weeks of incubation. 02/16/2021 0730     CULTURE, RESPIRATORY   Date Value Ref Range Status   01/01/2021 Oral Pharyngeal Hannah absent  Growth not present    Final     No results found for: CXCATHTIP  No results found for: BFCS  Culture Surgical   Date Value Ref Range Status   02/16/2021 Rare growth  Final   02/16/2021 Light growth  Final   02/16/2021   Final    Moderate growth  Refer to previous culture for susceptibility results  of CXSUR FOOT (Bone) collected 02/16/2021 at 07:32     02/16/2021   Final    Moderate growth  This isolate is presumed to be resistant based on the  detection of inducible Clindamycin resistance. Clindamycin  may still be effective in some patients.      02/16/2021 Moderate growth  Final     Urine Culture, Routine   Date Value Ref Range Status   02/14/2021 >100,000 CFU/ml  Final     MRSA Culture Only   Date Value Ref Range Status   12/26/2020 Methicillin resistant Staph aureus not isolated  Final     No results for input(s): CDIFFTOXANT in the last 72 hours. FINAL IMPRESSION    Patient is a 60 y.o. male who presented with   Chief Complaint   Patient presents with    Fatigue     Onset several days-hx left foot diabetic wound    and admitted for Wound infection [T14. 8XXA, L08.9]  Sepsis ([address] [A41.9]  Leukocytosis  Infected left foot   Large soft tissue defect/ulceration overlying the posterolateral aspect of   the calcaneus with adjacent cellulitis. The infectious/inflammatory changes   extend deep to the level of the bone with abnormal bone marrow signal   intensity seen within the posterior aspect of the calcaneus with loss of   normal T1 hyper signal intensity reflecting underlying osteomyelitis. Tendinosis and tearing of the lateral aspect of the distal Achillis tendon,   which may be infectious in origin. Cont atbx  Check cx  For LEFT FOOT DEBRIDEMENT INCISION AND DRAINAGE  Cont wound care     vancomycin (VANCOCIN) 1,750 mg in dextrose 5 % 500 mL IVPB, Once  cefepime (MAXIPIME) 2,000 mg in sodium chloride 0.9 % 50 mL IVPB ([id number]), Q12H  sulfamethoxazole-trimethoprim (BACTRIM DS;SEPTRA DS) 800-160 MG          Available labs, imaging studies, microbiologic studies have been reviewed. The patient/FAMILY  was educated about the diagnosis, prognosis, indications, risks and benefits of treatment. An opportunity to ask questions was given to the patient/FAMILY and questions were answered. Thank you for involving me in the care of Mikie Jernigan. Please do not hesitate to call for any questions or concerns.          Electronically signed by Awa Moreira MD on 9/14/2022 at 8:07 AM    Addenum   Blood cx Gram positive cocci in chains   Repeat blood cx  Sepsis with Strep septicemia due to cellulitis nad chronic OM left LE left heel  Kaylene Garcia MD  4:47 PM

## 2022-09-15 PROBLEM — M86.9 OSTEOMYELITIS OF LEFT LOWER EXTREMITY (HCC): Status: ACTIVE | Noted: 2022-09-15

## 2022-09-15 LAB
ALBUMIN SERPL-MCNC: 2.5 G/DL (ref 3.5–5.2)
ALP BLD-CCNC: 82 U/L (ref 40–129)
ALT SERPL-CCNC: 17 U/L (ref 0–40)
ANION GAP SERPL CALCULATED.3IONS-SCNC: 13 MMOL/L (ref 7–16)
ANION GAP SERPL CALCULATED.3IONS-SCNC: 14 MMOL/L (ref 7–16)
AST SERPL-CCNC: 30 U/L (ref 0–39)
BASOPHILS ABSOLUTE: 0 E9/L (ref 0–0.2)
BASOPHILS RELATIVE PERCENT: 0.3 % (ref 0–2)
BILIRUB SERPL-MCNC: 0.7 MG/DL (ref 0–1.2)
BUN BLDV-MCNC: 18 MG/DL (ref 6–23)
BUN BLDV-MCNC: 18 MG/DL (ref 6–23)
BURR CELLS: ABNORMAL
CALCIUM SERPL-MCNC: 8 MG/DL (ref 8.6–10.2)
CALCIUM SERPL-MCNC: 8.1 MG/DL (ref 8.6–10.2)
CHLORIDE BLD-SCNC: 93 MMOL/L (ref 98–107)
CHLORIDE BLD-SCNC: 94 MMOL/L (ref 98–107)
CO2: 19 MMOL/L (ref 22–29)
CO2: 20 MMOL/L (ref 22–29)
CREAT SERPL-MCNC: 0.7 MG/DL (ref 0.7–1.2)
CREAT SERPL-MCNC: 0.8 MG/DL (ref 0.7–1.2)
EOSINOPHILS ABSOLUTE: 0.17 E9/L (ref 0.05–0.5)
EOSINOPHILS RELATIVE PERCENT: 0.9 % (ref 0–6)
GFR AFRICAN AMERICAN: >60
GFR AFRICAN AMERICAN: >60
GFR NON-AFRICAN AMERICAN: >60 ML/MIN/1.73
GFR NON-AFRICAN AMERICAN: >60 ML/MIN/1.73
GLUCOSE BLD-MCNC: 134 MG/DL (ref 74–99)
GLUCOSE BLD-MCNC: 149 MG/DL (ref 74–99)
GRAM STAIN ORDERABLE: NORMAL
HCT VFR BLD CALC: 28 % (ref 37–54)
HEMOGLOBIN: 9.5 G/DL (ref 12.5–16.5)
LYMPHOCYTES ABSOLUTE: 0.19 E9/L (ref 1.5–4)
LYMPHOCYTES RELATIVE PERCENT: 0.9 % (ref 20–42)
MCH RBC QN AUTO: 29.3 PG (ref 26–35)
MCHC RBC AUTO-ENTMCNC: 33.9 % (ref 32–34.5)
MCV RBC AUTO: 86.4 FL (ref 80–99.9)
METER GLUCOSE: 136 MG/DL (ref 74–99)
METER GLUCOSE: 142 MG/DL (ref 74–99)
METER GLUCOSE: 155 MG/DL (ref 74–99)
METER GLUCOSE: 158 MG/DL (ref 74–99)
METER GLUCOSE: 175 MG/DL (ref 74–99)
MONOCYTES ABSOLUTE: 0.19 E9/L (ref 0.1–0.95)
MONOCYTES RELATIVE PERCENT: 0.9 % (ref 2–12)
NEUTROPHILS ABSOLUTE: 18.62 E9/L (ref 1.8–7.3)
NEUTROPHILS RELATIVE PERCENT: 97.4 % (ref 43–80)
OSMOLALITY: 267 MOSM/KG (ref 285–310)
PDW BLD-RTO: 13.3 FL (ref 11.5–15)
PLATELET # BLD: 289 E9/L (ref 130–450)
PMV BLD AUTO: 9.1 FL (ref 7–12)
POIKILOCYTES: ABNORMAL
POTASSIUM SERPL-SCNC: 3.4 MMOL/L (ref 3.5–5)
POTASSIUM SERPL-SCNC: 3.6 MMOL/L (ref 3.5–5)
RBC # BLD: 3.24 E12/L (ref 3.8–5.8)
SODIUM BLD-SCNC: 126 MMOL/L (ref 132–146)
SODIUM BLD-SCNC: 127 MMOL/L (ref 132–146)
SODIUM URINE: 68 MMOL/L
TOTAL PROTEIN: 6.3 G/DL (ref 6.4–8.3)
VANCOMYCIN TROUGH: 10.2 MCG/ML (ref 5–16)
WBC # BLD: 19.2 E9/L (ref 4.5–11.5)

## 2022-09-15 PROCEDURE — 83935 ASSAY OF URINE OSMOLALITY: CPT

## 2022-09-15 PROCEDURE — 6370000000 HC RX 637 (ALT 250 FOR IP): Performed by: INTERNAL MEDICINE

## 2022-09-15 PROCEDURE — 1200000000 HC SEMI PRIVATE

## 2022-09-15 PROCEDURE — 6360000002 HC RX W HCPCS: Performed by: STUDENT IN AN ORGANIZED HEALTH CARE EDUCATION/TRAINING PROGRAM

## 2022-09-15 PROCEDURE — 2580000003 HC RX 258: Performed by: STUDENT IN AN ORGANIZED HEALTH CARE EDUCATION/TRAINING PROGRAM

## 2022-09-15 PROCEDURE — 80053 COMPREHEN METABOLIC PANEL: CPT

## 2022-09-15 PROCEDURE — 80202 ASSAY OF VANCOMYCIN: CPT

## 2022-09-15 PROCEDURE — 84300 ASSAY OF URINE SODIUM: CPT

## 2022-09-15 PROCEDURE — 6370000000 HC RX 637 (ALT 250 FOR IP): Performed by: STUDENT IN AN ORGANIZED HEALTH CARE EDUCATION/TRAINING PROGRAM

## 2022-09-15 PROCEDURE — 82962 GLUCOSE BLOOD TEST: CPT

## 2022-09-15 PROCEDURE — 80048 BASIC METABOLIC PNL TOTAL CA: CPT

## 2022-09-15 PROCEDURE — 87040 BLOOD CULTURE FOR BACTERIA: CPT

## 2022-09-15 PROCEDURE — 83930 ASSAY OF BLOOD OSMOLALITY: CPT

## 2022-09-15 PROCEDURE — 99232 SBSQ HOSP IP/OBS MODERATE 35: CPT | Performed by: INTERNAL MEDICINE

## 2022-09-15 PROCEDURE — 36415 COLL VENOUS BLD VENIPUNCTURE: CPT

## 2022-09-15 PROCEDURE — 85025 COMPLETE CBC W/AUTO DIFF WBC: CPT

## 2022-09-15 RX ORDER — ACETAMINOPHEN 650 MG
TABLET, EXTENDED RELEASE ORAL PRN
Status: DISCONTINUED | OUTPATIENT
Start: 2022-09-15 | End: 2022-09-26 | Stop reason: HOSPADM

## 2022-09-15 RX ORDER — POTASSIUM CHLORIDE 20 MEQ/1
40 TABLET, EXTENDED RELEASE ORAL ONCE
Status: COMPLETED | OUTPATIENT
Start: 2022-09-15 | End: 2022-09-15

## 2022-09-15 RX ORDER — CALCIUM CARBONATE 200(500)MG
500 TABLET,CHEWABLE ORAL 3 TIMES DAILY PRN
Status: DISCONTINUED | OUTPATIENT
Start: 2022-09-15 | End: 2022-09-26 | Stop reason: HOSPADM

## 2022-09-15 RX ADMIN — VANCOMYCIN HYDROCHLORIDE 1000 MG: 1 INJECTION, POWDER, LYOPHILIZED, FOR SOLUTION INTRAVENOUS at 21:51

## 2022-09-15 RX ADMIN — POTASSIUM CHLORIDE 40 MEQ: 1500 TABLET, EXTENDED RELEASE ORAL at 12:45

## 2022-09-15 RX ADMIN — ENOXAPARIN SODIUM 40 MG: 100 INJECTION SUBCUTANEOUS at 09:16

## 2022-09-15 RX ADMIN — ATORVASTATIN CALCIUM 40 MG: 40 TABLET, FILM COATED ORAL at 21:34

## 2022-09-15 RX ADMIN — CEFEPIME 2000 MG: 2 INJECTION, POWDER, FOR SOLUTION INTRAVENOUS at 16:22

## 2022-09-15 RX ADMIN — GABAPENTIN 300 MG: 300 CAPSULE ORAL at 09:17

## 2022-09-15 RX ADMIN — GABAPENTIN 300 MG: 300 CAPSULE ORAL at 21:33

## 2022-09-15 RX ADMIN — ONDANSETRON 4 MG: 2 INJECTION INTRAMUSCULAR; INTRAVENOUS at 16:52

## 2022-09-15 RX ADMIN — SODIUM CHLORIDE: 900 INJECTION, SOLUTION INTRAVENOUS at 14:16

## 2022-09-15 RX ADMIN — CYCLOBENZAPRINE HYDROCHLORIDE 10 MG: 5 TABLET, FILM COATED ORAL at 21:33

## 2022-09-15 RX ADMIN — CLOPIDOGREL BISULFATE 75 MG: 75 TABLET ORAL at 09:16

## 2022-09-15 RX ADMIN — ASPIRIN 81 MG: 81 TABLET, COATED ORAL at 09:17

## 2022-09-15 RX ADMIN — CALCIUM CARBONATE (ANTACID) CHEW TAB 500 MG 500 MG: 500 CHEW TAB at 12:45

## 2022-09-15 RX ADMIN — CEFEPIME 2000 MG: 2 INJECTION, POWDER, FOR SOLUTION INTRAVENOUS at 05:15

## 2022-09-15 RX ADMIN — VANCOMYCIN HYDROCHLORIDE 1000 MG: 1 INJECTION, POWDER, LYOPHILIZED, FOR SOLUTION INTRAVENOUS at 00:27

## 2022-09-15 RX ADMIN — VANCOMYCIN HYDROCHLORIDE 1000 MG: 1 INJECTION, POWDER, LYOPHILIZED, FOR SOLUTION INTRAVENOUS at 10:32

## 2022-09-15 ASSESSMENT — PAIN DESCRIPTION - LOCATION: LOCATION: FOOT

## 2022-09-15 ASSESSMENT — PAIN DESCRIPTION - DESCRIPTORS: DESCRIPTORS: ACHING;PRESSURE;SORE;STABBING

## 2022-09-15 ASSESSMENT — PAIN SCALES - GENERAL: PAINLEVEL_OUTOF10: 7

## 2022-09-15 ASSESSMENT — PAIN DESCRIPTION - ORIENTATION: ORIENTATION: LEFT

## 2022-09-15 NOTE — ANESTHESIA POSTPROCEDURE EVALUATION
Department of Anesthesiology  Postprocedure Note    Patient: [facility]  MRN: [id number]  YOB: 1960  Date of evaluation: 9/15/2022      Procedure Summary     Date: 09/14/22 Room / Location: [address] [place] [address] / [address] [place] [address]    Anesthesia Start: 3913 Anesthesia Stop: 7642    Procedure: LEFT FOOT DEBRIDEMENT INCISION AND DRAINAGE (Left) Diagnosis:       Acute hematogenous osteomyelitis of left foot ([address]      (Acute hematogenous osteomyelitis of left foot ([address] [T60.129])    Surgeons: Tanya Higgins DPM Responsible Provider: Carey Coleman MD    Anesthesia Type: MAC ASA Status: 3          Anesthesia Type: No value filed.     Stephen Phase I: Stephen Score: 9    Stephen Phase II:        Anesthesia Post Evaluation    Patient location during evaluation: PACU  Patient participation: complete - patient participated  Level of consciousness: awake  Pain score: 0  Airway patency: patent  Nausea & Vomiting: no nausea  Complications: no  Cardiovascular status: blood pressure returned to baseline  Respiratory status: acceptable  Hydration status: euvolemic

## 2022-09-15 NOTE — PROGRESS NOTES
[address] [place], [place] [address]   Progress Note    Admitting Date and Time: 9/13/2022 12:00 PM  Admit Dx: Lactic acidosis [E87.2]  Septicemia ([address] [A41.9]  Wound infection [T14. 8XXA, L08.9]  Wound cellulitis [L03.90]  Sepsis ([address] [A41.9]    Subjective/interval history:    9/14: Pt feels tired but better than when he came in. Per RN:   patient to have emergent surgery on his left foot. 9/15: Patient feels better overall, does not have any significant postoperative pain. Labs significant for hyponatremia with sodium down to 126 this morning.      vancomycin  1,000 mg IntraVENous Q12H    gabapentin  300 mg Oral BID    clopidogrel  75 mg Oral Daily    aspirin  81 mg Oral Daily    atorvastatin  40 mg Oral Nightly    cyclobenzaprine  10 mg Oral Nightly    insulin lispro  0-4 Units SubCUTAneous Q4H    cefepime  2,000 mg IntraVENous Q12H    sodium chloride flush  10 mL IntraVENous 2 times per day    enoxaparin  40 mg SubCUTAneous Daily     povidone-iodine, , PRN  calcium carbonate, 500 mg, TID PRN  melatonin, 9 mg, Nightly PRN  diphenhydrAMINE, 25 mg, Q6H PRN  glucose, 4 tablet, PRN  dextrose bolus, 125 mL, PRN   Or  dextrose bolus, 250 mL, PRN  glucagon (rDNA), 1 mg, PRN  dextrose, , Continuous PRN  sodium chloride flush, 10 mL, PRN  sodium chloride, , PRN  promethazine, 12.5 mg, Q6H PRN   Or  ondansetron, 4 mg, Q6H PRN  polyethylene glycol, 17 g, Daily PRN  acetaminophen, 650 mg, Q6H PRN   Or  acetaminophen, 650 mg, Q6H PRN       Objective:    BP (!) 106/54   Pulse 88   Temp 99.5 °F (37.5 °C) (Oral)   Resp 20   Ht 5' 11\" (1.803 m)   Wt 192 lb (87.1 kg)   SpO2 97%   BMI 26.78 kg/m²   General Appearance: alert and oriented to person, place and time and in no acute distress  Skin: warm and dry  Head: normocephalic and atraumatic  Eyes: pupils equal, round, and reactive to light, extraocular eye movements intact, conjunctivae normal  Neck: neck supple and non tender without mass   Pulmonary/Chest: needed per SOP, 09/14/2022 10:31,   by ELLI     Culture, Blood 1 [[id number]] (Abnormal) Collected: 09/13/22 1459     Specimen: Blood Updated: 09/14/22 1027      Blood Culture, Routine --      Gram stain performed from blood culture bottle media   Gram positive cocci in chains        Narrative:       Source: BLOOD       Site: left ac         Radiology:   XR FOOT LEFT (2 VIEWS)   Final Result   Marked soft tissue swelling in the dorsal aspect of the mid and distal foot. Slight interval improvement of soft tissue ulceration in the heel and   cortical irregularity along the posterior margin of the calcaneus. Question hairline fracture, proximal phalanx of 5th toe. Please correlate   with focal tenderness. XR CHEST 1 VIEW   Final Result   No acute process. There are multiple old left-sided rib fractures. 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 *        Sepsis due to cellulitis, left calcaneal osteomyelitis as well as Strep pyogenes bacteremia in patient with left diabetic foot ulcer  -Sepsis evidenced by leukocytosis (33.6), tachycardia (102) and cellulitis of the foot  -Podiatry and ID following  -Status post incision and drainage of left calcaneus and posterior left leg on 9/14  -Continue vancomycin and cefepime and continue to follow culture results  -Therese that he will likely need to be discharged on IV antibiotics and require PICC line placement. He is agreeable    2. Lactic acidosis  -Due to the above but resolved with IV fluid hydration  -Lactic acid trend as follows 4.3-->4.1- >1.9    3.  Type II diabetes mellitus  -Continue active scale insulin. Will add basal insulin if needed for blood glucose greater than 200    4. Hypertension/CAD  -Holding blood pressure meds for now  -Statin and Plavix resumed postoperatively    5. Hyponatremia  -likely hypovolemic due to sepsis   -Continue normal saline at 100 mL/h  -Check repeat BMP, as well as serum osmolality, urine sodium, and urine osmolality    Disposition: Ultimately, home with home health care. He will need at least several more days in the hospital for postoperative care, IV antibiotics, and following culture results    NOTE: This report was transcribed using voice recognition software. Every effort was made to ensure accuracy; however, inadvertent computerized transcription errors may be present.      Electronically signed by Mark Cardenas DO on 9/15/2022 at 5:09 PM

## 2022-09-15 NOTE — PROGRESS NOTES
canister. Mild surrounding erythema. No malodor, purulence, or fluctuance. Maceration plantarly. On the right heel, a granular wound, appears to be stable. Measures approximately 1.5cm in diameter and down to subQ tissue. No signs of infection. Mild erythema. MUSCULOSKELETAL:  Muscle strength is 3/5 to the foot and leg muscle groups on the left side, 4/5 on the right. No pain on compression of calves. No clinical signs of DVT. Severe pain on palpation of heels. Left LE involuntary contractions. Scheduled Meds:   gabapentin  300 mg Oral BID    clopidogrel  75 mg Oral Daily    aspirin  81 mg Oral Daily    atorvastatin  40 mg Oral Nightly    cyclobenzaprine  10 mg Oral Nightly    insulin lispro  0-4 Units SubCUTAneous Q4H    vancomycin  1,000 mg IntraVENous Q12H    cefepime  2,000 mg IntraVENous Q12H    sodium chloride flush  10 mL IntraVENous 2 times per day    enoxaparin  40 mg SubCUTAneous Daily     Continuous Infusions:   dextrose      sodium chloride 100 mL/hr at 09/14/22 0715    sodium chloride       PRN Meds:.melatonin, diphenhydrAMINE, glucose, dextrose bolus **OR** dextrose bolus, glucagon (rDNA), dextrose, sodium chloride flush, sodium chloride, promethazine **OR** ondansetron, polyethylene glycol, acetaminophen **OR** acetaminophen    RADIOLOGY:  XR FOOT LEFT (2 VIEWS)   Final Result   Marked soft tissue swelling in the dorsal aspect of the mid and distal foot. Slight interval improvement of soft tissue ulceration in the heel and   cortical irregularity along the posterior margin of the calcaneus. Question hairline fracture, proximal phalanx of 5th toe. Please correlate   with focal tenderness. XR CHEST 1 VIEW   Final Result   No acute process. There are multiple old left-sided rib fractures.            BP (!) 106/54   Pulse 88   Temp 99.5 °F (37.5 °C) (Oral)   Resp 20   Ht 5' 11\" (1.803 m)   Wt 192 lb (87.1 kg)   SpO2 97%   BMI 26.78 kg/m²     LABS:    Recent Labs     09/13/22  1329 09/14/22  0634   WBC 33.6* 25.9*   HGB 12.0* 9.8*   HCT 35.3* 29.3*   * 300        Recent Labs     09/14/22  0634   *   K 3.6   CL 93*   CO2 21*   BUN 16   CREATININE 0.9        Recent Labs     09/13/22  1329 09/14/22  0634   PROT 8.5* 6.4         ASSESSMENT:  1.S/P LLE I&D,Debridement (dos:9/14/22)  2. LLE Ulcer- POA, Infected  3. Acute vs. Chronic OM  4. DM with neuropathic changes     Wound infection    Sepsis ([facility] [address]        PLAN:  - Patient was examined and evaluated. Reviewed patient's recent lab results and pertinent diagnostic imaging. Reviewed ancillary service notes. - Pain Control: IV and PO  - Antibiotics as per ID: Vanc, Cefepime, Bactrim  - Vascular:Pending  - Venous US:Pending  - Cultures : Pending  - Path: Pending  - X-rays: 1. Possible hairline fracture proximal phalanx of 5th  - Dressings clean, dry and intact without dishevelment:betadine soaked kerlix, DSD.   - Prevalon boots   - Discussed patient with Dr. Andreia Strong   - Will continue to follow patient while they are in-house.

## 2022-09-15 NOTE — CARE COORDINATION
9/15/2022 1150 CM note: No covid testing. Pt with positive blood cx and acute osteomyelitis left calcaneus. Pt resides with his brother. He uses a cane to ambulate and has a w/c, shower chair and glucometer. Pt is active with [facility] for SN/wound care. He has a hx of Lalithaner. At this time pt plans to return home with [facility], he declines SNF. CM to follow for wound care and abx needs. Pt may benefit from PT/OT evals to assist with transition of care needs.  Tim OJEDA

## 2022-09-15 NOTE — PROGRESS NOTES
below: MUSCULOSKELETAL:  MMT Deferred. Equinus. No posterior calf pain on palpation              Scheduled Meds:   vancomycin  1,000 mg IntraVENous Q12H    gabapentin  300 mg Oral BID    clopidogrel  75 mg Oral Daily    aspirin  81 mg Oral Daily    atorvastatin  40 mg Oral Nightly    cyclobenzaprine  10 mg Oral Nightly    insulin lispro  0-4 Units SubCUTAneous Q4H    cefepime  2,000 mg IntraVENous Q12H    sodium chloride flush  10 mL IntraVENous 2 times per day    enoxaparin  40 mg SubCUTAneous Daily     Continuous Infusions:   dextrose      sodium chloride 100 mL/hr at 09/15/22 1416    sodium chloride       PRN Meds:.povidone-iodine, calcium carbonate, melatonin, diphenhydrAMINE, glucose, dextrose bolus **OR** dextrose bolus, glucagon (rDNA), dextrose, sodium chloride flush, sodium chloride, promethazine **OR** ondansetron, polyethylene glycol, acetaminophen **OR** acetaminophen    RADIOLOGY:  XR FOOT LEFT (2 VIEWS)   Final Result   Marked soft tissue swelling in the dorsal aspect of the mid and distal foot. Slight interval improvement of soft tissue ulceration in the heel and   cortical irregularity along the posterior margin of the calcaneus. Question hairline fracture, proximal phalanx of 5th toe. Please correlate   with focal tenderness. XR CHEST 1 VIEW   Final Result   No acute process. There are multiple old left-sided rib fractures.          VL LOWER EXTREMITY ARTERIAL SEGMENTAL PRESSURES W PPG BILATERAL    (Results Pending)     BP (!) 106/54   Pulse 88   Temp 99.5 °F (37.5 °C) (Oral)   Resp 20   Ht 5' 11\" (1.803 m)   Wt 192 lb (87.1 kg)   SpO2 97%   BMI 26.78 kg/m²     LABS:    Recent Labs     09/14/22  0634 09/15/22  0725   WBC 25.9* 19.2*   HGB 9.8* 9.5*   HCT 29.3* 28.0*    289          Recent Labs     09/15/22  1606   *   K 3.6   CL 93*   CO2 20*   BUN 18   CREATININE 0.7          Recent Labs     09/14/22  0634 09/15/22  0725   PROT 6.4 6.3* ASSESSMENT:  1.S/P LLE I&D,Debridement (dos:9/14/22)  2. LLE Ulcer- POA, Infected  3. Acute vs. Chronic OM  4. DM with neuropathic changes     Wound infection    Sepsis ([facility] [address]        PLAN:  - Patient was examined and evaluated. Reviewed patient's recent lab results and pertinent diagnostic imaging. Reviewed ancillary service notes. - Pain Control: IV and PO  - Antibiotics as per ID: Vanc, Cefepime  - Vascular:Pending  - Venous US:Pending  - Cultures : Pending  - Path: Pending  - X-rays: 1. Possible hairline fracture proximal phalanx of 5th  - Dressings changed this afternoon: betadine soaked kerlix, DSD. QOD dressing changes. - Prevalon boots   - Discussed patient with Dr. Dom Fay   - Will continue to follow patient while they are in-house. Patient will likely need secondary surgical intervention, partial calcanectomy as early as next week.

## 2022-09-15 NOTE — PROGRESS NOTES
Pharmacy Consultation Note  (Antibiotic Dosing and Monitoring)    Initial consult date: 9/14/22  Consulting physician/provider: Tea Pleitez MD  Drug: Vancomycin  Indication: Skin and Soft Tissue Infections    Age/  Gender Height Weight IBW  Allergy Information   62 y.o./male 5' 11\" (180.3 cm) 192 lb (87.1 kg)     Ideal body weight: 75.3 kg (166 lb 0.1 oz)  Adjusted ideal body weight: 80 kg (176 lb 6.5 oz)   Pcn [penicillins]      Renal Function:  Recent Labs     09/13/22  1329 09/14/22  0634 09/15/22  0725   BUN 13 16 18   CREATININE 1.0 0.9 0.8         Intake/Output Summary (Last 24 hours) at 9/15/2022 1144  Last data filed at 9/15/2022 0650  Gross per 24 hour   Intake 400 ml   Output 1460 ml   Net -1060 ml         Vancomycin Monitoring:  Trough:    Recent Labs     09/15/22  0725   VANCOTROUGH 10.2     Random:  No results for input(s): VANCORANDOM in the last 72 hours.     Recent Labs     09/13/22  1459   BLOODCULT2 Gram stain performed from blood culture bottle media  Gram positive cocci in chains  Previously positive blood culture called  *  Refer to previous culture CXBL 9/13/2022 1459 for susceptibility results          Historical Cultures:  Organism   Date Value Ref Range Status   09/13/2022 Streptococcus species (A)  Preliminary   09/13/2022 Streptococcus species (A)  Preliminary     Recent Labs     09/13/22  1459   BC Gram stain performed from blood culture bottle media  Gram positive cocci in chains  *  Identification and sensitivity to follow     Recent vancomycin administrations                     vancomycin (VANCOCIN) 1,000 mg in sodium chloride 0.9 % 250 mL IVPB ([id number]) (mg) 1,000 mg New Bag 09/15/22 1032     1,000 mg New Bag  0027    vancomycin (VANCOCIN) 1,750 mg in dextrose 5 % 500 mL IVPB (mg) 1,750 mg New Bag 09/14/22 0932                         Assessment:  Patient is a 60 y.o. male who has been initiated on vancomycin  Estimated Creatinine Clearance: 101 mL/min (based on SCr of 0.8 mg/dL).   To dose vancomycin, pharmacy will be utilizing [employer] calculation software for goal AUC/ANNELIESE 400-600 mg/L-hr    Plan:  Continue Vancomycin 1000 mg IV every 12 hours   Will check vancomycin level when appropriate  Will continue to monitor renal function   Clinical pharmacy to follow      Dora Alexis, [address] [place] [address] 9/15/2022 11:44 AM

## 2022-09-15 NOTE — PROGRESS NOTES
Patient is refusing the taylor catheter to be taken out at this time. I explained the risks of leaving it in and the alternatives that we can use. He states he wants to wait till tomorrow.

## 2022-09-15 NOTE — OP NOTE
[address]                                OPERATIVE REPORT    PATIENT NAME: Kim Escamilla                   :        1960  MED REC NO:   [id number]                            ROOM:       2581  ACCOUNT NO:   [de-identified]                           ADMIT DATE: 2022  PROVIDER:     Stevie Martin DPM    DATE OF PROCEDURE:  2022    SURGEON:  Stevie Martin DPM    PREOPERATIVE DIAGNOSES:  1. Acute osteomyelitis, left calcaneus. 2.  Cellulitis with abscess left leg. POSTOPERATIVE DIAGNOSE:  1. Acute osteomyelitis, left calcaneus. 2.  Cellulitis with abscess left leg. PROCEDURES:  1. Incision and drainage, left calcaneus bone. 2.  Incision and drainage posterior left leg. ANESTHESIA:  Monitored anesthesia care. INJECTABLES:  20 mL 0.5% Marcaine plain. COMPLICATIONS:  None. ESTIMATED BLOOD LOSS:  Minimal.    SPECIMENS:  Left calcaneus bone and left leg ulcers. INTRAOPERATIVE FINDINGS:  Over 10 mL purulent drainage, necrosis of  calcaneus bone. INDICATIONS:  This is a pleasant 39-year-old male who presented today  for left foot debridement and incision and drainage. I counseled the  patient the nature of problem, proposed course of procedure, potential  benefits, risks, complications, and convalescence in detail. All  questions were answered to the patient's apparent satisfaction. No  guarantees were given as to the outcome of procedure. The patient came  in to the Emergency Department with sepsis, severe fatigue and malaise. At this point he had significant leukocytosis also with 25.9 white cell  count, elevated CRP and ESR as well. The patient was slightly  hyponatremic. OPERATIVE PROCEDURE:  Under mild sedation, the patient was brought to  the operating room and placed on the operating table in supine position.   The left lower extremity was scrubbed, prepped, and draped

## 2022-09-15 NOTE — PROGRESS NOTES
NAME: Dimitri Carias  MR:  [id number]  :   1960  DATE OF SERVICE:09/15/22    This is a face to face encounter with Dimitri aCrias 60 y.o. male on 09/15/22  Elements of this note, including Diagnosis,  Interval History, Past Medical/Surgical/Family/Social Histories, ROS, physical exam, and Assessment and Plan were copied and pasted from Previous. Updates have been made where noted and reflect current exam and medical decision making from the DOS of this encounter. CHIEF COMPLAINT     ID following for   Chief Complaint   Patient presents with    Fatigue     Onset several days-hx left foot diabetic wound     HISTORY OF PRESENT ILLNESS     Pt seen and examined  09/15/22  PT BEING CLEANED UP LEFT LE DRESSED  AFEBIRLE UZV98.6 CR0.8  Patient is tolerating medications. No reported adverse drug reactions. REVIEW OF SYSTEMS     As stated above in the chief complaint, otherwise negative.   CURRENT MEDICATIONS     Current Facility-Administered Medications:     povidone-iodine (BETADINE) 10 % external solution, , Topical, PRN, Napolean MD Carolyn    vancomycin (VANCOCIN) 1,000 mg in sodium chloride 0.9 % 250 mL IVPB ([id number]), 1,000 mg, IntraVENous, Q12H, Yeison Ahammad, DPM    calcium carbonate (TUMS) chewable tablet 500 mg, 500 mg, Oral, TID PRN, Samira Hair Raspovic, DO, 500 mg at 09/15/22 1245    gabapentin (NEURONTIN) capsule 300 mg, 300 mg, Oral, BID, Yeison Ahammad, DPM, 300 mg at 09/15/22 0917    clopidogrel (PLAVIX) tablet 75 mg, 75 mg, Oral, Daily, Yeison Ahammad, DPM, 75 mg at 09/15/22 0916    aspirin EC tablet 81 mg, 81 mg, Oral, Daily, Yeison Ahammad, DPM, 81 mg at 09/15/22 0917    melatonin tablet 9 mg, 9 mg, Oral, Nightly PRN, Yeison Ahammad, DPM, 9 mg at 22    diphenhydrAMINE (BENADRYL) tablet 25 mg, 25 mg, Oral, Q6H PRN, Yeison Ahammad, DPM    atorvastatin (LIPITOR) tablet 40 mg, 40 mg, Oral, Nightly, Yeison Ahammad, DPM, 40 mg at 22    cyclobenzaprine (FLEXERIL) tablet 10 mg, 10 mg, Oral, Nightly, Yeison Ahammad, DPM, 10 mg at 22    glucose chewable tablet 16 g, 4 tablet, Oral, PRN, Yeison Ahammad, DPM    dextrose bolus 10% 125 mL, 125 mL, IntraVENous, PRN **OR** dextrose bolus 10% 250 mL, 250 mL, IntraVENous, PRN, Yeison Ahammad, DPM    glucagon (rDNA) injection 1 mg, 1 mg, SubCUTAneous, PRN, Yeison Ahammad, DPM    dextrose 10 % infusion, , IntraVENous, Continuous PRN, Yeison Ahammad, DPM    insulin lispro (HUMALOG) injection vial 0-4 Units, 0-4 Units, SubCUTAneous, Q4H, Yeison Ahammad, DPM    0.9 % sodium chloride infusion, , IntraVENous, Continuous, Yeison Ahammad, DPM, Last Rate: 100 mL/hr at 22 0715, New Bag at 22 0715    cefepime (MAXIPIME) 2,000 mg in sodium chloride 0.9 % 50 mL IVPB ([id number]), 2,000 mg, IntraVENous, Q12H, Josias Shaw, DPM, Stopped at 09/15/22 0915    sodium chloride flush 0.9 % injection 10 mL, 10 mL, IntraVENous, 2 times per day, Yeison Ahammad, DPM, 10 mL at 22 0018    sodium chloride flush 0.9 % injection 10 mL, 10 mL, IntraVENous, PRN, Yeison Ahammad, DPM    0.9 % sodium chloride infusion, , IntraVENous, PRN, Yeison Ahammad, DPM    enoxaparin (LOVENOX) injection 40 mg, 40 mg, SubCUTAneous, Daily, Yeison Ahammad, DPM, 40 mg at 09/15/22 0916    promethazine (PHENERGAN) tablet 12.5 mg, 12.5 mg, Oral, Q6H PRN **OR** ondansetron (ZOFRAN) injection 4 mg, 4 mg, IntraVENous, Q6H PRN, Yeison Ahammad, DPM    polyethylene glycol (GLYCOLAX) packet 17 g, 17 g, Oral, Daily PRN, Yeison Ahammad, DPM    acetaminophen (TYLENOL) tablet 650 mg, 650 mg, Oral, Q6H PRN **OR** acetaminophen (TYLENOL) suppository 650 mg, 650 mg, Rectal, Q6H PRN, Yeison Flanagan DPM  PHYSICAL EXAM     BP (!) 106/54   Pulse 88   Temp 99.5 °F (37.5 °C) (Oral)   Resp 20   Ht 5' 11\" (1.803 m)   Wt 192 lb (87.1 kg)   SpO2 97%   BMI 26.78 kg/m²   Temp  Av.7 °F (37.1 °C)  Min: 97 °F (36.1 °C)  Max: 99.6 °F (37.6 °C)  Constitutional:  The patient is awake, alert, and oriented. Skin:    Warm and dry. No rashes were noted. HEENT:   Round and reactive pupils. AT/NC  Neck:    Supple to movements. Chest:   No use of accessory muscles to breathe. Symmetrical expansion. Cardiovascular:  S1 and S2 are rhythmic and regular. No murmurs appreciated. Abdomen:   Positive bowel sounds to auscultation. Benign to palpation. DISTENDED  Extremities:   No clubbing, no cyanosis,  edema. LEFT LE ACED  CNS    AAxO   Lines:    Urinary Catheter 09/14/22 Cole (Active)   Site Assessment Swelling 09/15/22 0811   Urine Color Yellow 09/15/22 0811   Urine Appearance Clear 09/15/22 0811   Collection Container Standard 09/15/22 0811   Catheter Best Practices  Drainage tube clipped to bed;Catheter secured to thigh; Bag below bladder;Bag not on floor; Lack of dependent loop in tubing;Drainage bag less than half full 09/15/22 0811   Status Draining 09/15/22 0811   Output (mL) 550 mL 09/15/22 0650        Peripheral Intravenous Line:  Peripheral IV 09/15/22 Proximal;Right Forearm (Active)   Site Assessment Clean, dry & intact 09/15/22 0800   Line Status Infusing 09/15/22 0800   Line Care Connections checked and tightened 09/15/22 0015   Phlebitis Assessment No symptoms 09/15/22 0800   Infiltration Assessment 0 09/15/22 0800   Dressing Status Clean, dry & intact 09/15/22 0800   Dressing Type Transparent 09/15/22 0800        Drain(s):  Urinary Catheter 09/14/22 Cole (Active)   Site Assessment Swelling 09/15/22 0811   Urine Color Yellow 09/15/22 0811   Urine Appearance Clear 09/15/22 0811   Collection Container Standard 09/15/22 0811   Catheter Best Practices  Drainage tube clipped to bed;Catheter secured to thigh; Bag below bladder;Bag not on floor; Lack of dependent loop in tubing;Drainage bag less than half full 09/15/22 0811   Status Draining 09/15/22 0811   Output (mL) 550 mL 09/15/22 0650           DIAGNOSTIC RESULTS   Radiology:    Recent Labs     09/13/22  1329 09/14/22  0634 09/15/22  0725   WBC 33.6* 25.9* 19.2*   RBC 4.06 3.38* 3.24*   HGB 12.0* 9.8* 9.5*   HCT 35.3* 29.3* 28.0*   MCV 86.9 86.7 86.4   MCH 29.6 29.0 29.3   MCHC 34.0 33.4 33.9   RDW 13.3 13.2 13.3   * 300 289   MPV 8.7 8.6 9.1     Recent Labs     09/13/22  1329 09/14/22  0632 09/14/22  0634 09/15/22  0725   *  --  127* 126*   K 3.8  --  3.6 3.4*   CL 87*  --  93* 94*   CO2 23  --  21* 19*   BUN 13  --  16 18   CREATININE 1.0  --  0.9 0.8   GLUCOSE 194* 154 143* 134*   PROT 8.5*  --  6.4 6.3*   LABALBU 3.6  --  2.9* 2.5*   CALCIUM 9.4  --  7.9* 8.0*   BILITOT 1.0  --  0.6 0.7   ALKPHOS 100  --  85 82   AST 23  --  31 30   ALT 15  --  14 17     Lab Results   Component Value Date    CRP 24.9 (H) 09/13/2022    CRP 2.3 (H) 02/14/2021     Lab Results   Component Value Date    SEDRATE 90 (H) 09/13/2022    SEDRATE 87 (H) 02/14/2021     Recent Labs     09/13/22  1329 09/13/22  1459 09/14/22  0634 09/15/22  0725   CRP  --  24.9*  --   --    AST 23  --  31 30   ALT 15  --  14 17     Lab Results   Component Value Date/Time    CHOL 164 03/01/2016 08:10 AM    TRIG 170 03/01/2016 08:10 AM    HDL 43 03/01/2016 08:10 AM    LDLCALC 87 03/01/2016 08:10 AM    LABVLDL 34 03/01/2016 08:10 AM     Lab Results   Component Value Date/Time    VITD25 12 (L) 12/28/2020 09:15 AM       Microbiology:   No results for input(s): COVID19 in the last 72 hours.   Lab Results   Component Value Date/Time    [facility]  09/13/2022 02:59 PM     Gram stain performed from blood culture bottle media  Gram positive cocci in chains      BC Identification and sensitivity to follow 09/13/2022 02:59 PM    BC 5 Days no growth 02/16/2021 04:37 PM    [facility]  09/13/2022 02:59 PM     Gram stain performed from blood culture bottle media  Gram positive cocci in chains  Previously positive blood culture called      BLOODCULT2  09/13/2022 02:59 PM     Refer to previous culture CXBL 9/13/2022 1459 for susceptibility results    BLOODCULT2 5 Days no growth 02/16/2021 04:37 PM    ORG Streptococcus species 09/13/2022 02:59 PM    ORG Streptococcus species 09/13/2022 02:59 PM    ORG Staphylococcus aureus 09/13/2022 02:59 PM     WOUND/ABSCESS   Date Value Ref Range Status   09/13/2022 (A)  Preliminary    Additional growth present, also evaluating for;  Mixed Gram negative rods  Mixed Gram positive organisms     09/13/2022 Heavy growth  Sensitivity to follow    Preliminary   08/29/2022 Moderate growth  Final   08/29/2022   Final    Moderate growth  This isolate is presumed to be resistant based on the  detection of inducible Clindamycin resistance. Clindamycin  may still be effective in some patients. 08/29/2022 Moderate growth  Final     Smear, Respiratory   Date Value Ref Range Status   01/01/2021   Final    Group 6: <25 PMN's/LPF and <25 Epithelial cells/LPF  Rare Polymorphonuclear leukocytes  Epithelial cells not seen  No organisms seen           Component Value Date/Time    AFBCX No growth after 6 weeks of incubation. 02/16/2021 0732    AFBCX No growth after 6 weeks of incubation. 02/16/2021 0730    FUNGSM No Fungal elements seen 02/16/2021 0732    LABFUNG No growth after 4 weeks of incubation. 02/16/2021 0732    LABFUNG No growth after 4 weeks of incubation.  02/16/2021 0730       MRSA Culture Only   Date Value Ref Range Status   12/26/2020 Methicillin resistant Staph aureus not isolated  Final     CULTURE, RESPIRATORY   Date Value Ref Range Status   01/01/2021 Oral Pharyngeal Hannah absent  Growth not present    Final     Recent Labs     09/13/22  1459   ORG Staphylococcus aureus*  Streptococcus species*  Streptococcus species*     Recent Labs     09/13/22  1459   WNDABS Additional growth present, also evaluating for;  Mixed Gram negative rods  Mixed Gram positive organisms  *  Heavy growth  Sensitivity to follow     ORG Staphylococcus aureus*  Streptococcus species*  Streptococcus species*     Recent Labs     09/13/22  1459   ORG Staphylococcus aureus*  Streptococcus species* Streptococcus species*      No results for input(s): CDIFFTOXANT in the last 72 hours. FINAL IMPRESSION    Pt had   Chief Complaint   Patient presents with    Fatigue     Onset several days-hx left foot diabetic wound    Admitted for Lactic acidosis [E87.2]  HAS SEPSIS  -Streptococcus species SEPTICEMIA  -LEUKOCYTOSIS   -LEFT LE CELLULITIS  -LEFT HEEL OM     PROCEDURES:  1. Incision and drainage, left calcaneus bone. 2.  Incision and drainage posterior left leg.     vancomycin (VANCOCIN) 1,000 mg in sodium chloride 0.9 % 250 mL IVPB    cefepime (MAXIPIME) 2,000 mg in sodium chloride 0.9 % 50 mL IVPB ([id number]), Q12H         To follow ordered labs, cultures and imaging. Imaging and labs were reviewed per medical records. The patient was educated about the diagnosis, prognosis, indications, risks and benefits of treatment. An opportunity to ask questions was given to the patient/FAMILY. Thank you for involving me in the care of [facility] I will continue to follow. Please do not hesitate to call for any questions or concerns.     Electronically signed by Rikki Velazco MD on 9/15/2022 at 1:00 PM

## 2022-09-16 ENCOUNTER — APPOINTMENT (OUTPATIENT)
Dept: INTERVENTIONAL RADIOLOGY/VASCULAR | Age: 62
DRG: 854 | End: 2022-09-16
Payer: COMMERCIAL

## 2022-09-16 PROBLEM — I25.10 CORONARY ARTERY DISEASE INVOLVING NATIVE CORONARY ARTERY OF NATIVE HEART WITHOUT ANGINA PECTORIS: Status: ACTIVE | Noted: 2022-09-16

## 2022-09-16 PROBLEM — F10.10 ALCOHOL ABUSE: Status: ACTIVE | Noted: 2022-09-16

## 2022-09-16 PROBLEM — A40.0: Status: ACTIVE | Noted: 2022-09-16

## 2022-09-16 LAB
ALBUMIN SERPL-MCNC: 2.3 G/DL (ref 3.5–5.2)
ALP BLD-CCNC: 94 U/L (ref 40–129)
ALT SERPL-CCNC: 23 U/L (ref 0–40)
ANAEROBIC CULTURE: NORMAL
ANION GAP SERPL CALCULATED.3IONS-SCNC: 11 MMOL/L (ref 7–16)
ANION GAP SERPL CALCULATED.3IONS-SCNC: 13 MMOL/L (ref 7–16)
AST SERPL-CCNC: 36 U/L (ref 0–39)
BASOPHILS ABSOLUTE: 0.04 E9/L (ref 0–0.2)
BASOPHILS RELATIVE PERCENT: 0.2 % (ref 0–2)
BILIRUB SERPL-MCNC: 0.8 MG/DL (ref 0–1.2)
BUN BLDV-MCNC: 18 MG/DL (ref 6–23)
BUN BLDV-MCNC: 18 MG/DL (ref 6–23)
CALCIUM SERPL-MCNC: 8 MG/DL (ref 8.6–10.2)
CALCIUM SERPL-MCNC: 8.3 MG/DL (ref 8.6–10.2)
CHLORIDE BLD-SCNC: 96 MMOL/L (ref 98–107)
CHLORIDE BLD-SCNC: 97 MMOL/L (ref 98–107)
CO2: 19 MMOL/L (ref 22–29)
CO2: 20 MMOL/L (ref 22–29)
CREAT SERPL-MCNC: 0.7 MG/DL (ref 0.7–1.2)
CREAT SERPL-MCNC: 0.7 MG/DL (ref 0.7–1.2)
CULTURE, BLOOD 2: ABNORMAL
CULTURE, BLOOD 2: ABNORMAL
EOSINOPHILS ABSOLUTE: 0.19 E9/L (ref 0.05–0.5)
EOSINOPHILS RELATIVE PERCENT: 1 % (ref 0–6)
GFR AFRICAN AMERICAN: >60
GFR AFRICAN AMERICAN: >60
GFR NON-AFRICAN AMERICAN: >60 ML/MIN/1.73
GFR NON-AFRICAN AMERICAN: >60 ML/MIN/1.73
GLUCOSE BLD-MCNC: 132 MG/DL (ref 74–99)
GLUCOSE BLD-MCNC: 169 MG/DL (ref 74–99)
HCT VFR BLD CALC: 27.5 % (ref 37–54)
HEMOGLOBIN: 9.2 G/DL (ref 12.5–16.5)
IMMATURE GRANULOCYTES #: 0.13 E9/L
IMMATURE GRANULOCYTES %: 0.7 % (ref 0–5)
LYMPHOCYTES ABSOLUTE: 0.8 E9/L (ref 1.5–4)
LYMPHOCYTES RELATIVE PERCENT: 4.3 % (ref 20–42)
MAGNESIUM: 2.2 MG/DL (ref 1.6–2.6)
MCH RBC QN AUTO: 28.8 PG (ref 26–35)
MCHC RBC AUTO-ENTMCNC: 33.5 % (ref 32–34.5)
MCV RBC AUTO: 86.2 FL (ref 80–99.9)
METER GLUCOSE: 139 MG/DL (ref 74–99)
METER GLUCOSE: 151 MG/DL (ref 74–99)
METER GLUCOSE: 158 MG/DL (ref 74–99)
METER GLUCOSE: 191 MG/DL (ref 74–99)
MONOCYTES ABSOLUTE: 0.82 E9/L (ref 0.1–0.95)
MONOCYTES RELATIVE PERCENT: 4.4 % (ref 2–12)
NEUTROPHILS ABSOLUTE: 16.8 E9/L (ref 1.8–7.3)
NEUTROPHILS RELATIVE PERCENT: 89.4 % (ref 43–80)
ORGANISM: ABNORMAL
ORGANISM: ABNORMAL
PDW BLD-RTO: 13.7 FL (ref 11.5–15)
PHOSPHORUS: 1.8 MG/DL (ref 2.5–4.5)
PLATELET # BLD: 272 E9/L (ref 130–450)
PMV BLD AUTO: 8.7 FL (ref 7–12)
POTASSIUM SERPL-SCNC: 3.4 MMOL/L (ref 3.5–5)
POTASSIUM SERPL-SCNC: 4 MMOL/L (ref 3.5–5)
RBC # BLD: 3.19 E12/L (ref 3.8–5.8)
SODIUM BLD-SCNC: 127 MMOL/L (ref 132–146)
SODIUM BLD-SCNC: 129 MMOL/L (ref 132–146)
TOTAL PROTEIN: 6.2 G/DL (ref 6.4–8.3)
WBC # BLD: 18.8 E9/L (ref 4.5–11.5)

## 2022-09-16 PROCEDURE — 6370000000 HC RX 637 (ALT 250 FOR IP): Performed by: STUDENT IN AN ORGANIZED HEALTH CARE EDUCATION/TRAINING PROGRAM

## 2022-09-16 PROCEDURE — 2580000003 HC RX 258: Performed by: STUDENT IN AN ORGANIZED HEALTH CARE EDUCATION/TRAINING PROGRAM

## 2022-09-16 PROCEDURE — 93923 UPR/LXTR ART STDY 3+ LVLS: CPT

## 2022-09-16 PROCEDURE — 85025 COMPLETE CBC W/AUTO DIFF WBC: CPT

## 2022-09-16 PROCEDURE — 82962 GLUCOSE BLOOD TEST: CPT

## 2022-09-16 PROCEDURE — 6370000000 HC RX 637 (ALT 250 FOR IP): Performed by: INTERNAL MEDICINE

## 2022-09-16 PROCEDURE — 36415 COLL VENOUS BLD VENIPUNCTURE: CPT

## 2022-09-16 PROCEDURE — 6360000002 HC RX W HCPCS: Performed by: STUDENT IN AN ORGANIZED HEALTH CARE EDUCATION/TRAINING PROGRAM

## 2022-09-16 PROCEDURE — 84100 ASSAY OF PHOSPHORUS: CPT

## 2022-09-16 PROCEDURE — 99232 SBSQ HOSP IP/OBS MODERATE 35: CPT | Performed by: NURSE PRACTITIONER

## 2022-09-16 PROCEDURE — 99232 SBSQ HOSP IP/OBS MODERATE 35: CPT | Performed by: INTERNAL MEDICINE

## 2022-09-16 PROCEDURE — 2500000003 HC RX 250 WO HCPCS: Performed by: INTERNAL MEDICINE

## 2022-09-16 PROCEDURE — 80053 COMPREHEN METABOLIC PANEL: CPT

## 2022-09-16 PROCEDURE — 2580000003 HC RX 258: Performed by: INTERNAL MEDICINE

## 2022-09-16 PROCEDURE — 80048 BASIC METABOLIC PNL TOTAL CA: CPT

## 2022-09-16 PROCEDURE — 83735 ASSAY OF MAGNESIUM: CPT

## 2022-09-16 PROCEDURE — 1200000000 HC SEMI PRIVATE

## 2022-09-16 RX ADMIN — GABAPENTIN 300 MG: 300 CAPSULE ORAL at 10:47

## 2022-09-16 RX ADMIN — VANCOMYCIN HYDROCHLORIDE 1000 MG: 1 INJECTION, POWDER, LYOPHILIZED, FOR SOLUTION INTRAVENOUS at 11:02

## 2022-09-16 RX ADMIN — CEFEPIME 2000 MG: 2 INJECTION, POWDER, FOR SOLUTION INTRAVENOUS at 17:41

## 2022-09-16 RX ADMIN — VANCOMYCIN HYDROCHLORIDE 1000 MG: 1 INJECTION, POWDER, LYOPHILIZED, FOR SOLUTION INTRAVENOUS at 21:50

## 2022-09-16 RX ADMIN — GABAPENTIN 300 MG: 300 CAPSULE ORAL at 20:13

## 2022-09-16 RX ADMIN — POTASSIUM BICARBONATE 40 MEQ: 782 TABLET, EFFERVESCENT ORAL at 10:44

## 2022-09-16 RX ADMIN — CEFEPIME 2000 MG: 2 INJECTION, POWDER, FOR SOLUTION INTRAVENOUS at 04:16

## 2022-09-16 RX ADMIN — SODIUM PHOSPHATE, MONOBASIC, MONOHYDRATE 20 MMOL: 276; 142 INJECTION, SOLUTION INTRAVENOUS at 12:22

## 2022-09-16 RX ADMIN — ATORVASTATIN CALCIUM 40 MG: 40 TABLET, FILM COATED ORAL at 20:13

## 2022-09-16 RX ADMIN — POTASSIUM BICARBONATE 40 MEQ: 782 TABLET, EFFERVESCENT ORAL at 20:13

## 2022-09-16 RX ADMIN — Medication 10 ML: at 20:13

## 2022-09-16 RX ADMIN — CLOPIDOGREL BISULFATE 75 MG: 75 TABLET ORAL at 10:47

## 2022-09-16 RX ADMIN — CYCLOBENZAPRINE HYDROCHLORIDE 10 MG: 5 TABLET, FILM COATED ORAL at 20:13

## 2022-09-16 RX ADMIN — ENOXAPARIN SODIUM 40 MG: 100 INJECTION SUBCUTANEOUS at 10:46

## 2022-09-16 RX ADMIN — VANCOMYCIN HYDROCHLORIDE 1000 MG: 1 INJECTION, POWDER, LYOPHILIZED, FOR SOLUTION INTRAVENOUS at 22:06

## 2022-09-16 RX ADMIN — ASPIRIN 81 MG: 81 TABLET, COATED ORAL at 10:47

## 2022-09-16 ASSESSMENT — PAIN SCALES - GENERAL: PAINLEVEL_OUTOF10: 0

## 2022-09-16 NOTE — PROGRESS NOTES
Department of Podiatry  Progress Note    SUBJECTIVE:  Mr. Jeremy Goss was evaluated at bedside this morning S/P LLE I&D, Debridement (DOS: 9/14/22) No acute events overnight. Patient denies any N/V/D/F/C/SOB/CP and has no other pedal complaints at this time. OBJECTIVE:    Scheduled Meds:   potassium bicarb-citric acid  40 mEq Oral BID    sodium phosphate IVPB  20 mmol IntraVENous Once    vancomycin  1,000 mg IntraVENous Q12H    gabapentin  300 mg Oral BID    clopidogrel  75 mg Oral Daily    aspirin  81 mg Oral Daily    atorvastatin  40 mg Oral Nightly    cyclobenzaprine  10 mg Oral Nightly    insulin lispro  0-4 Units SubCUTAneous Q4H    cefepime  2,000 mg IntraVENous Q12H    sodium chloride flush  10 mL IntraVENous 2 times per day    enoxaparin  40 mg SubCUTAneous Daily     Continuous Infusions:   dextrose      sodium chloride       PRN Meds:.povidone-iodine, calcium carbonate, melatonin, diphenhydrAMINE, glucose, dextrose bolus **OR** dextrose bolus, glucagon (rDNA), dextrose, sodium chloride flush, sodium chloride, promethazine **OR** ondansetron, polyethylene glycol, acetaminophen **OR** acetaminophen    Allergies   Allergen Reactions    Pcn [Penicillins] Anaphylaxis       BP (!) 144/75   Pulse 84   Temp 98.6 °F (37 °C) (Oral)   Resp 16   Ht 5' 11\" (1.803 m)   Wt 192 lb (87.1 kg)   SpO2 97%   BMI 26.78 kg/m²       EXAM:    NEUROLOGICAL EXAMINATION: Protective sensation absent to bilateral lower extremities. VASCULAR EXAMINATION:  DP and PT pulses are palpable. Capillary refill is WNL. Skin temperature is warm to warm from tiibial tuberosity to distal digits. There is absence of hair growth noted bilaterally. DERMATOLOGICAL:  Surgical incision site secondary to infected ulcer noted to the posterior aspect lf the left heel and calf measurea about 20.0x5.5cmx0.5cm with 100% granular tissue. Achilles exposed. No noted pus or drainage. No tunneling or burrowing noted.  Mild skin maceration noted distally. Mild erythema and edema noted. All as pictured below:    MUSCULOSKELETAL:  MMT Deferred. Equinus. No posterior calf pain on palpation            Scheduled Meds:   potassium bicarb-citric acid  40 mEq Oral BID    sodium phosphate IVPB  20 mmol IntraVENous Once    vancomycin  1,000 mg IntraVENous Q12H    gabapentin  300 mg Oral BID    clopidogrel  75 mg Oral Daily    aspirin  81 mg Oral Daily    atorvastatin  40 mg Oral Nightly    cyclobenzaprine  10 mg Oral Nightly    insulin lispro  0-4 Units SubCUTAneous Q4H    cefepime  2,000 mg IntraVENous Q12H    sodium chloride flush  10 mL IntraVENous 2 times per day    enoxaparin  40 mg SubCUTAneous Daily     Continuous Infusions:   dextrose      sodium chloride       PRN Meds:.povidone-iodine, calcium carbonate, melatonin, diphenhydrAMINE, glucose, dextrose bolus **OR** dextrose bolus, glucagon (rDNA), dextrose, sodium chloride flush, sodium chloride, promethazine **OR** ondansetron, polyethylene glycol, acetaminophen **OR** acetaminophen    RADIOLOGY:  XR FOOT LEFT (2 VIEWS)   Final Result   Marked soft tissue swelling in the dorsal aspect of the mid and distal foot. Slight interval improvement of soft tissue ulceration in the heel and   cortical irregularity along the posterior margin of the calcaneus. Question hairline fracture, proximal phalanx of 5th toe. Please correlate   with focal tenderness. XR CHEST 1 VIEW   Final Result   No acute process. There are multiple old left-sided rib fractures.          VL LOWER EXTREMITY ARTERIAL SEGMENTAL PRESSURES W PPG BILATERAL    (Results Pending)     BP (!) 144/75   Pulse 84   Temp 98.6 °F (37 °C) (Oral)   Resp 16   Ht 5' 11\" (1.803 m)   Wt 192 lb (87.1 kg)   SpO2 97%   BMI 26.78 kg/m²     LABS:    Recent Labs     09/15/22  0725 09/16/22  0650   WBC 19.2* 18.8*   HGB 9.5* 9.2*   HCT 28.0* 27.5*    272          Recent Labs     09/16/22  0650   *   K 3.4*   CL 96*   CO2 20* PHOS 1.8*   BUN 18   CREATININE 0.7          Recent Labs     09/15/22  0725 09/16/22  0650   PROT 6.3* 6.2*           ASSESSMENT:  1.S/P LLE I&D,Debridement (dos:9/14/22)  2. LLE Ulcer- POA, Infected  3. Acute vs. Chronic OM  4. DM with neuropathic changes     Wound infection    Sepsis ([facility] [address]        PLAN:  - Patient was examined and evaluated. Reviewed patient's recent lab results and pertinent diagnostic imaging. Reviewed ancillary service notes. - Pain Control: IV and PO  - Antibiotics as per ID: Vanc, Cefepime  - Vascular:Performed, results still Pending  - Venous US:Pending  - Cultures :Still Pending  - Path: Still Pending  - X-rays: 1. Possible hairline fracture proximal phalanx of 5th  - Dressings changed this morning: betadine soaked kerlix, DSD. Daily dressing changes. - Prevalon boots   - Discussed patient with Dr. Cm Pham   - Will continue to follow patient while they are in-house. Patient will likely need secondary surgical intervention, partial calcanectomy as early as next week.

## 2022-09-16 NOTE — PROGRESS NOTES
[address] [place], [place] [address]ist   Progress Note    Admitting Date and Time: 9/13/2022 12:00 PM  Admit Dx: Lactic acidosis [E87.2]  Septicemia ([facility] [address] [A41.9]  Wound infection [T14. 8XXA, L08.9]  Wound cellulitis [L03.90]  Sepsis ([facility] [address] [A41.9]    Subjective:    Patient seen and examined. Wants to go home. Reports dry mouth, dry lips. Left sided weakness from prior stroke. Still with LLE edema, erythema. Denies pain. ROS:   Denies CP, SOB, subjective fever, chills, N/V/D, abdominal pain,  HA. All systems reviewed and negative unless otherwise documented.       potassium bicarb-citric acid  40 mEq Oral BID    sodium phosphate IVPB  20 mmol IntraVENous Once    vancomycin  1,000 mg IntraVENous Q12H    gabapentin  300 mg Oral BID    clopidogrel  75 mg Oral Daily    aspirin  81 mg Oral Daily    atorvastatin  40 mg Oral Nightly    cyclobenzaprine  10 mg Oral Nightly    insulin lispro  0-4 Units SubCUTAneous Q4H    cefepime  2,000 mg IntraVENous Q12H    sodium chloride flush  10 mL IntraVENous 2 times per day    enoxaparin  40 mg SubCUTAneous Daily     povidone-iodine, , PRN  calcium carbonate, 500 mg, TID PRN  melatonin, 9 mg, Nightly PRN  diphenhydrAMINE, 25 mg, Q6H PRN  glucose, 4 tablet, PRN  dextrose bolus, 125 mL, PRN   Or  dextrose bolus, 250 mL, PRN  glucagon (rDNA), 1 mg, PRN  dextrose, , Continuous PRN  sodium chloride flush, 10 mL, PRN  sodium chloride, , PRN  promethazine, 12.5 mg, Q6H PRN   Or  ondansetron, 4 mg, Q6H PRN  polyethylene glycol, 17 g, Daily PRN  acetaminophen, 650 mg, Q6H PRN   Or  acetaminophen, 650 mg, Q6H PRN       Objective:    BP (!) 144/75   Pulse 84   Temp 98.6 °F (37 °C) (Oral)   Resp 16   Ht 5' 11\" (1.803 m)   Wt 192 lb (87.1 kg)   SpO2 97%   BMI 26.78 kg/m²   General Appearance: alert and oriented to person, place and time and in no acute distress  Skin: warm and dry  Head: normocephalic and atraumatic  Eyes: pupils equal, round, and reactive to light, extraocular eye movements intact, conjunctivae normal  Neck: neck supple and non tender without mass   Pulmonary/Chest: clear to auscultation bilaterally- no wheezes, rales or rhonchi, normal air movement, no respiratory distress  Cardiovascular: normal rate, normal S1 and S2 and no carotid bruits  Abdomen: soft, non-tender, non-distended, normal bowel sounds, no masses or organomegaly  Extremities: no cyanosis, no clubbing and LLE edema, RUE edema. LLE bulky ace dressing, edema, erythema  Neurologic: no cranial nerve deficit and speech normal      Recent Labs     09/15/22  0725 09/15/22  1606 09/16/22  0650   * 127* 127*   K 3.4* 3.6 3.4*   CL 94* 93* 96*   CO2 19* 20* 20*   BUN 18 18 18   CREATININE 0.8 0.7 0.7   GLUCOSE 134* 149* 132*   CALCIUM 8.0* 8.1* 8.0*       Recent Labs     09/14/22  0634 09/15/22  0725 09/16/22  0650   ALKPHOS 85 82 94   PROT 6.4 6.3* 6.2*   LABALBU 2.9* 2.5* 2.3*   BILITOT 0.6 0.7 0.8   AST 31 30 36   ALT 14 17 23       Recent Labs     09/14/22  0634 09/15/22  0725 09/16/22  0650   WBC 25.9* 19.2* 18.8*   RBC 3.38* 3.24* 3.19*   HGB 9.8* 9.5* 9.2*   HCT 29.3* 28.0* 27.5*   MCV 86.7 86.4 86.2   MCH 29.0 29.3 28.8   MCHC 33.4 33.9 33.5   RDW 13.2 13.3 13.7    289 272   MPV 8.6 9.1 8.7         Radiology:   XR FOOT LEFT (2 VIEWS)   Final Result   Marked soft tissue swelling in the dorsal aspect of the mid and distal foot. Slight interval improvement of soft tissue ulceration in the heel and   cortical irregularity along the posterior margin of the calcaneus. Question hairline fracture, proximal phalanx of 5th toe. Please correlate   with focal tenderness. XR CHEST 1 VIEW   Final Result   No acute process. There are multiple old left-sided rib fractures.          VL LOWER EXTREMITY ARTERIAL SEGMENTAL PRESSURES W PPG BILATERAL    (Results Pending)     XR FOOT LEFT (2 VIEWS)    Result Date: 9/13/2022  EXAMINATION: TWO XRAY VIEWS OF THE LEFT FOOT 9/13/2022 2:13 pm COMPARISON: August 8, 2022 HISTORY: ORDERING SYSTEM PROVIDED HISTORY: pain wound TECHNOLOGIST PROVIDED HISTORY: Reason for exam:->pain wound FINDINGS: The bones are demineralized. Mild cortical irregularity at the base of the proximal phalanx of the 5th toe. There is deformity of the hindfoot, either posttraumatic or developmental.  There is marked soft tissue swelling, especially in the dorsal aspect of the mid and distal foot. There is soft tissue irregularity posterior to the calcaneus and mild cortical irregularity of the posterior margin of the calcaneus. Marked soft tissue swelling in the dorsal aspect of the mid and distal foot. Slight interval improvement of soft tissue ulceration in the heel and cortical irregularity along the posterior margin of the calcaneus. Question hairline fracture, proximal phalanx of 5th toe. Please correlate with focal tenderness. XR CHEST 1 VIEW    Result Date: 9/13/2022  EXAMINATION: ONE XRAY VIEW OF THE CHEST 9/13/2022 2:12 pm COMPARISON: None. HISTORY: ORDERING SYSTEM PROVIDED HISTORY: pna TECHNOLOGIST PROVIDED HISTORY: Reason for exam:->pna FINDINGS: The lungs are without acute focal process. There is no effusion or pneumothorax. The cardiomediastinal silhouette is without acute process. The osseous structures are without acute process. No acute process. There are multiple old left-sided rib fractures. Assessment:  Principal Problem:    Sepsis ([address]  Active Problems:    Diabetes mellitus ([address]    Osteomyelitis of left lower extremity (HCC)    Hyponatremia    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 2 diabetes mellitus, with necrosis of bone ([address]  Resolved Problems:    * No resolved hospital problems.  *      Plan:    Sepsis due to cellulitis, left calcaneal osteomyelitis as well as Strep pyogenes bacteremia in patient with left diabetic foot ulcer  -Sepsis evidenced by leukocytosis (33.6), tachycardia (102) and cellulitis of the foot  -Podiatry and ID following  -09/14 status post I&D left calcaneus and posterior left leg. Per podiatry note, plans for secondary surgical intervention, partial calcanectomy as early as next week. -Continue vancomycin (day 3) and cefepime (day 4), follow culture results  -He will likely need to be discharged on IV antibiotics and require PICC line placement. He is agreeable  -wound cx with heavy growth Staphylococcus aureus   -BC x 2 with Streptococcus species      2. Lactic acidosis  -Due to the above but resolved with IV fluid hydration  -Lactic acid trend as follows 4.3-->4.1- >1.9     3. Type II diabetes mellitus  -Continue low dose scale insulin, carb control diet  -Will add basal insulin if needed for blood glucose greater than 200     4. Hypertension/CAD  -Holding blood pressure meds for now  -Statin and Plavix resumed postoperatively     5. Hyponatremia  -likely hypovolemic due to sepsis   -Continue normal saline at 100 mL/h  -serum osmolality 267, urine sodium, and urine osmolality 68    6. Mild hypokalemia  -replace and follow BMP  -Mg 2.2     25 mins spent  reviewing patient chart, assessing patient, discussing plan of care with patient and family, discussing plan of care with collaborating physician, and charting.          Electronically signed by ALBERT Franz CNP on 9/16/2022 at 10:49 AM

## 2022-09-16 NOTE — PROGRESS NOTES
Pharmacy Consultation Note  (Antibiotic Dosing and Monitoring)    Initial consult date: 9/14/22  Consulting physician/provider: Miguel Vera MD  Drug: Vancomycin  Indication: Skin and Soft Tissue Infections    Age/  Gender Height Weight IBW  Allergy Information   62 y.o./male 5' 11\" (180.3 cm) 192 lb (87.1 kg)     Ideal body weight: 75.3 kg (166 lb 0.1 oz)  Adjusted ideal body weight: 80 kg (176 lb 6.5 oz)   Pcn [penicillins]      Renal Function:  Recent Labs     09/15/22  0725 09/15/22  1606 09/16/22  0650   BUN 18 18 18   CREATININE 0.8 0.7 0.7         Intake/Output Summary (Last 24 hours) at 9/16/2022 1316  Last data filed at 9/16/2022 0605  Gross per 24 hour   Intake 60 ml   Output 1300 ml   Net -1240 ml         Vancomycin Monitoring:  Trough:    Recent Labs     09/15/22  0725   VANCOTROUGH 10.2       Random:  No results for input(s): VANCORANDOM in the last 72 hours. Recent Labs     09/15/22  0725   BLOODCULT2 24 Hours no growth          Historical Cultures:  Organism   Date Value Ref Range Status   09/13/2022 BHS Group A (Strep pyogenes) (A)  Final   09/13/2022 BHS Group A (Strep pyogenes) (A)  Final   09/13/2022 Staphylococcus aureus (A)  Preliminary   09/13/2022 Strep pyogenes (Beta Strep Group A) (A)  Preliminary     Recent Labs     09/15/22  0724   BC 24 Hours no growth       Recent vancomycin administrations                     vancomycin (VANCOCIN) 1,000 mg in sodium chloride 0.9 % 250 mL IVPB ([id number]) (mg) 1,000 mg New Bag 09/16/22 1102     1,000 mg New Bag 09/15/22 2151    vancomycin (VANCOCIN) 1,000 mg in sodium chloride 0.9 % 250 mL IVPB ([id number]) (mg) 1,000 mg New Bag 09/15/22 1032     1,000 mg New Bag  0027    vancomycin (VANCOCIN) 1,750 mg in dextrose 5 % 500 mL IVPB (mg) 1,750 mg New Bag 09/14/22 0932                            Assessment:  Patient is a 60 y.o. male who has been initiated on vancomycin  Estimated Creatinine Clearance: 115 mL/min (based on SCr of 0.7 mg/dL).   To dose vancomycin, pharmacy will be utilizing [employer] calculation software for goal AUC/ANNELIESE 400-600 mg/L-hr    Plan:  Continue Vancomycin 1000 mg IV every 12 hours   Will check vancomycin level when appropriate  Will continue to monitor renal function   Clinical pharmacy to follow      ANA LUISA Wright [facility] 9/16/2022 1:16 PM

## 2022-09-16 NOTE — PROGRESS NOTES
NAME: Emilia Balderrama  MR:  [id number]  :   1960  DATE OF SERVICE:22    This is a face to face encounter with Emilia Balderrama 60 y.o. male on 22  Elements of this note, including Diagnosis,  Interval History, Past Medical/Surgical/Family/Social Histories, ROS, physical exam, and Assessment and Plan were copied and pasted from Previous. Updates have been made where noted and reflect current exam and medical decision making from the DOS of this encounter. CHIEF COMPLAINT     ID following for   Chief Complaint   Patient presents with    Fatigue     Onset several days-hx left foot diabetic wound     HISTORY OF PRESENT ILLNESS     Pt seen and examined  22  In bed has no c/o RA  AFEBIRLE WBC18.8  CR0.7    Patient is tolerating medications. No reported adverse drug reactions. REVIEW OF SYSTEMS     As stated above in the chief complaint, otherwise negative.   CURRENT MEDICATIONS     Current Facility-Administered Medications:     potassium bicarb-citric acid (EFFER-K) effervescent tablet 40 mEq, 40 mEq, Oral, BID, Lamont Wells, DO, 40 mEq at 22 1044    sodium phosphate 20 mmol in sodium chloride 0.9 % 500 mL IVPB, 20 mmol, IntraVENous, Once, [employer], DO, Last Rate: 125 mL/hr at 22 1222, 20 mmol at 22 1222    povidone-iodine (BETADINE) 10 % external solution, , Topical, PRN, Alex Hernandez MD    vancomycin (VANCOCIN) 1,000 mg in sodium chloride 0.9 % 250 mL IVPB ([id number]), 1,000 mg, IntraVENous, Q12H, Yeison Ahammad, DPM, Stopped at 22 1202    calcium carbonate (TUMS) chewable tablet 500 mg, 500 mg, Oral, TID PRN, Waylon Wells DO, 500 mg at 09/15/22 1245    gabapentin (NEURONTIN) capsule 300 mg, 300 mg, Oral, BID, Yeison Ahammad, DPM, 300 mg at 22 1047    clopidogrel (PLAVIX) tablet 75 mg, 75 mg, Oral, Daily, Yeison Ahammad, DPM, 75 mg at 22 1047    aspirin EC tablet 81 mg, 81 mg, Oral, Daily, Yeison Flanagan DPM, 81 mg at 22 1040 melatonin tablet 9 mg, 9 mg, Oral, Nightly PRN, Yeison Ahammad, DPM, 9 mg at 09/14/22 2134    diphenhydrAMINE (BENADRYL) tablet 25 mg, 25 mg, Oral, Q6H PRN, Yeison Ahammad, DPM    atorvastatin (LIPITOR) tablet 40 mg, 40 mg, Oral, Nightly, Yeison Ahammad, DPM, 40 mg at 09/15/22 2134    cyclobenzaprine (FLEXERIL) tablet 10 mg, 10 mg, Oral, Nightly, Yeison Ahammad, DPM, 10 mg at 09/15/22 2133    glucose chewable tablet 16 g, 4 tablet, Oral, PRN, Yeison Ahammad, DPM    dextrose bolus 10% 125 mL, 125 mL, IntraVENous, PRN **OR** dextrose bolus 10% 250 mL, 250 mL, IntraVENous, PRN, Yeison Ahammad, DPM    glucagon (rDNA) injection 1 mg, 1 mg, SubCUTAneous, PRN, Yeison Ahammad, DPM    dextrose 10 % infusion, , IntraVENous, Continuous PRN, Yeison Ahammad, DPM    insulin lispro (HUMALOG) injection vial 0-4 Units, 0-4 Units, SubCUTAneous, Q4H, Yeison Ahammad, DPM    0.9 % sodium chloride infusion, , IntraVENous, Continuous, Lamont Wells DO, Last Rate: 75 mL/hr at 09/16/22 1050, Rate Change at 09/16/22 1050    cefepime (MAXIPIME) 2,000 mg in sodium chloride 0.9 % 50 mL IVPB ([id number]), 2,000 mg, IntraVENous, Q12H, Yeison Ahammad, DPM, Stopped at 09/16/22 0816    sodium chloride flush 0.9 % injection 10 mL, 10 mL, IntraVENous, 2 times per day, Yeison Ahammad, DPM, 10 mL at 09/14/22 0018    sodium chloride flush 0.9 % injection 10 mL, 10 mL, IntraVENous, PRN, Yeison Ahammad, DPM    0.9 % sodium chloride infusion, , IntraVENous, PRN, Yeison Ahammad, DPM    enoxaparin (LOVENOX) injection 40 mg, 40 mg, SubCUTAneous, Daily, Yeison Ahammad, DPM, 40 mg at 09/16/22 1046    promethazine (PHENERGAN) tablet 12.5 mg, 12.5 mg, Oral, Q6H PRN **OR** ondansetron (ZOFRAN) injection 4 mg, 4 mg, IntraVENous, Q6H PRN, Yeison Ahammad, DPM, 4 mg at 09/15/22 1652    polyethylene glycol (GLYCOLAX) packet 17 g, 17 g, Oral, Daily PRN, Yeison Ahammad, DPM    acetaminophen (TYLENOL) tablet 650 mg, 650 mg, Oral, Q6H PRN **OR** acetaminophen (TYLENOL) suppository 650 mg, 650 mg, Rectal, Q6H PRN, Yeison Flanagan, DPM  PHYSICAL EXAM     BP (!) 144/75   Pulse 84   Temp 98.6 °F (37 °C) (Oral)   Resp 16   Ht 5' 11\" (1.803 m)   Wt 192 lb (87.1 kg)   SpO2 97%   BMI 26.78 kg/m²   Temp  Av.6 °F (37 °C)  Min: 98.6 °F (37 °C)  Max: 98.6 °F (37 °C)  Constitutional:  The patient is awake, alert   Skin:    Warm and dry. HEENT:     AT/NC     Chest:   No use of accessory muscles to breathe. Symmetrical expansion. Cardiovascular:  S1 and S2 are rhythmic and regular. No murmurs appreciated. Abdomen:   Positive bowel sounds to auscultation. Benign to palpation. DISTENDED  Extremities:   No clubbing, no cyanosis,  edema. LEFT LE ACED  CNS    AAxO   Lines:    Urinary Catheter 22 Cole (Active)   Site Assessment Swelling 09/15/22 08   Urine Color Yellow 09/15/22 0811   Urine Appearance Clear 09/15/22 0811   Collection Container Standard 09/15/22 0811   Catheter Best Practices  Drainage tube clipped to bed;Catheter secured to thigh; Bag below bladder;Bag not on floor; Lack of dependent loop in tubing;Drainage bag less than half full 09/15/22 08   Status Draining 09/15/22 0811   Output (mL) 550 mL 09/15/22 0650        Peripheral Intravenous Line:  Peripheral IV 09/15/22 Proximal;Right Forearm (Active)   Site Assessment Clean, dry & intact 09/15/22 0800   Line Status Infusing 09/15/22 0800   Line Care Connections checked and tightened 09/15/22 0015   Phlebitis Assessment No symptoms 09/15/22 08   Infiltration Assessment 0 09/15/22 0800   Dressing Status Clean, dry & intact 09/15/22 0800   Dressing Type Transparent 09/15/22 0800        Drain(s):  Urinary Catheter 22 Cole (Active)   Site Assessment Swelling 09/15/22 08   Urine Color Yellow 09/15/22 08   Urine Appearance Clear 09/15/22 08   Collection Container Standard 09/15/22 0811   Catheter Best Practices  Drainage tube clipped to bed;Catheter secured to thigh; Bag below bladder;Bag not on floor; Lack of dependent loop in tubing;Drainage bag less than half full 09/15/22 0811   Status Draining 09/15/22 0811   Output (mL) 550 mL 09/15/22 0650           DIAGNOSTIC RESULTS   Radiology:    Recent Labs     09/14/22  0634 09/15/22  0725 09/16/22  0650   WBC 25.9* 19.2* 18.8*   RBC 3.38* 3.24* 3.19*   HGB 9.8* 9.5* 9.2*   HCT 29.3* 28.0* 27.5*   MCV 86.7 86.4 86.2   MCH 29.0 29.3 28.8   MCHC 33.4 33.9 33.5   RDW 13.2 13.3 13.7    289 272   MPV 8.6 9.1 8.7       Recent Labs     09/14/22  0634 09/15/22  0725 09/15/22  1606 09/16/22  0650   * 126* 127* 127*   K 3.6 3.4* 3.6 3.4*   CL 93* 94* 93* 96*   CO2 21* 19* 20* 20*   BUN 16 18 18 18   CREATININE 0.9 0.8 0.7 0.7   GLUCOSE 143* 134* 149* 132*   PROT 6.4 6.3*  --  6.2*   LABALBU 2.9* 2.5*  --  2.3*   CALCIUM 7.9* 8.0* 8.1* 8.0*   BILITOT 0.6 0.7  --  0.8   ALKPHOS 85 82  --  94   AST 31 30  --  36   ALT 14 17  --  23       Lab Results   Component Value Date    CRP 24.9 (H) 09/13/2022    CRP 2.3 (H) 02/14/2021     Lab Results   Component Value Date    SEDRATE 90 (H) 09/13/2022    SEDRATE 87 (H) 02/14/2021     Recent Labs     09/13/22  1459 09/14/22  0634 09/15/22  0725 09/16/22  0650   CRP 24.9*  --   --   --    AST  --  31 30 36   ALT  --  14 17 23       Lab Results   Component Value Date/Time    CHOL 164 03/01/2016 08:10 AM    TRIG 170 03/01/2016 08:10 AM    HDL 43 03/01/2016 08:10 AM    LDLCALC 87 03/01/2016 08:10 AM    LABVLDL 34 03/01/2016 08:10 AM     Lab Results   Component Value Date/Time    VITD25 12 (L) 12/28/2020 09:15 AM       Microbiology:   No results for input(s): COVID19 in the last 72 hours.   Lab Results   Component Value Date/Time    BC 24 Hours no growth 09/15/2022 07:24 AM    BC 5 Days no growth 02/16/2021 04:37 PM    BC Previously positive blood culture called 02/14/2021 07:07 PM    [facility]  02/14/2021 07:07 PM     Refer to previous culture for susceptibility results  of CXBL2 (LAC) collected 02/14/2021 at 19:07      BLOODCULT2 24 Hours no growth 09/15/2022 07:25 AM    BLOODCULT2 Previously positive blood culture called 09/13/2022 02:59 PM    BLOODCULT2  09/13/2022 02:59 PM     Refer to previous culture CXBL 9/13/2022 1459 for susceptibility results    ORG BHS Group A (Strep pyogenes) 09/13/2022 02:59 PM    ORG BHS Group A (Strep pyogenes) 09/13/2022 02:59 PM    ORG Staphylococcus aureus 09/13/2022 02:59 PM    ORG Strep pyogenes (Beta Strep Group A) 09/13/2022 02:59 PM     WOUND/ABSCESS   Date Value Ref Range Status   09/13/2022 (A)  Preliminary    Additional growth present, also evaluating for;  Mixed Gram negative rods  Mixed Gram positive organisms  Mixed laura isolated. Further workup and sensitivity testing  is not routinely indicated and will not be performed. Mixed laura isolated includes:  Mixed Gram negative rods  Gram positive rods catalase negative     09/13/2022   Preliminary    Heavy growth  Sensitivity to follow  This isolate is presumed to be resistant based on the  detection of inducible Clindamycin resistance. Clindamycin  may still be effective in some patients. 09/13/2022 Heavy growth  Sensitivity to follow    Preliminary     Smear, Respiratory   Date Value Ref Range Status   01/01/2021   Final    Group 6: <25 PMN's/LPF and <25 Epithelial cells/LPF  Rare Polymorphonuclear leukocytes  Epithelial cells not seen  No organisms seen           Component Value Date/Time    AFBCX No growth after 6 weeks of incubation. 02/16/2021 0732    AFBCX No growth after 6 weeks of incubation. 02/16/2021 0730    FUNGSM No Fungal elements seen 02/16/2021 0732    LABFUNG No growth after 4 weeks of incubation. 02/16/2021 0732    LABFUNG No growth after 4 weeks of incubation.  02/16/2021 0730       MRSA Culture Only   Date Value Ref Range Status   12/26/2020 Methicillin resistant Staph aureus not isolated  Final     CULTURE, RESPIRATORY   Date Value Ref Range Status   01/01/2021 Oral Pharyngeal Laura absent  Growth not present    Final Recent Labs     09/13/22  1459 09/14/22  1641   CXSURG  --  Growth not present, incubation continues   ORG Staphylococcus aureus*  Strep pyogenes (Beta Strep Group A)*  BHS Group A (Strep pyogenes)*  BHS Group A (Strep pyogenes)*  --        Recent Labs     09/13/22  1459   WNDABS Additional growth present, also evaluating for;  Mixed Gram negative rods  Mixed Gram positive organisms  Mixed laura isolated. Further workup and sensitivity testing  is not routinely indicated and will not be performed. Mixed laura isolated includes:  Mixed Gram negative rods  Gram positive rods catalase negative  *  Heavy growth  Sensitivity to follow  This isolate is presumed to be resistant based on the  detection of inducible Clindamycin resistance. Clindamycin  may still be effective in some patients. Heavy growth  Sensitivity to follow     ORG Staphylococcus aureus*  Strep pyogenes (Beta Strep Group A)*  BHS Group A (Strep pyogenes)*  BHS Group A (Strep pyogenes)*          FINAL IMPRESSION    Pt had   Chief Complaint   Patient presents with    Fatigue     Onset several days-hx left foot diabetic wound    Admitted for Lactic acidosis [E87.2]  HAS SEPSIS  -Streptococcus species SEPTICEMIA  -LEUKOCYTOSIS   -LEFT LE CELLULITIS  -LEFT HEEL OM     PROCEDURES:  1. Incision and drainage, left calcaneus bone. 2.  Incision and drainage posterior left leg. For l  secondary surgical intervention, partial calcanectomy as early as next week. vancomycin (VANCOCIN) 1,000 mg in sodium chloride 0.9 % 250 mL IVPB    cefepime (MAXIPIME) 2,000 mg in sodium chloride 0.9 % 50 mL IVPB ([id number]), Q12H     Place picc    To follow ordered labs, cultures and imaging. Imaging and labs were reviewed per medical records. The patient was educated about the diagnosis, prognosis, indications, risks and benefits of treatment. An opportunity to ask questions was given to the patient/FAMILY.   Thank you for involving me in the care of Hali Crigler will continue to follow. Please do not hesitate to call for any questions or concerns.     Electronically signed by Benitez Chung MD on 9/16/2022 at 1:48 PM

## 2022-09-16 NOTE — CARE COORDINATION
9/16/2022 1439 CM note: No covid testing. Pt with positive blood cx and acute osteomyelitis left calcaneus. Pt resides with his brother. He uses a cane to ambulate and has a w/c, shower chair and glucometer. Pt is active with [facility] for SN/wound care. Per ID, anticipating IV abx at d/c. Per podiatry notes,pt will most likely need secondary surgical intervention as early as next week. Pt informed that if he needs iv abx at [place], pt or caregiver would need to be taught iv therapy from [facility]. He relays that he can not do self iv abx and unsure who could assist. Possibility of LTACH discussed. Pt prefers to return home with [facility] but agreeable for [facility] referral and prefers [facility]. He declines SNF at this time. Referral placed to [facility] liaison Jayde Segura to review. CM will follow for abx/wound care needs and LTACH criteria. LTACH would require PRECERT. Tim OJEDA      The Plan for Transition of Care is related to the following treatment goals: LTACH    The Patient  was provided with a choice of provider and agrees   with the discharge plan. [x] Yes [] No    Freedom of choice list was provided with basic dialogue that supports the patient's individualized plan of care/goals, treatment preferences and shares the quality data associated with the providers.  [x] Yes [] No

## 2022-09-17 LAB
ALBUMIN SERPL-MCNC: 2.1 G/DL (ref 3.5–5.2)
ALP BLD-CCNC: 100 U/L (ref 40–129)
ALT SERPL-CCNC: 26 U/L (ref 0–40)
ANION GAP SERPL CALCULATED.3IONS-SCNC: 11 MMOL/L (ref 7–16)
AST SERPL-CCNC: 39 U/L (ref 0–39)
BASOPHILS ABSOLUTE: 0.06 E9/L (ref 0–0.2)
BASOPHILS RELATIVE PERCENT: 0.4 % (ref 0–2)
BILIRUB SERPL-MCNC: 0.7 MG/DL (ref 0–1.2)
BUN BLDV-MCNC: 17 MG/DL (ref 6–23)
CALCIUM SERPL-MCNC: 8.1 MG/DL (ref 8.6–10.2)
CHLORIDE BLD-SCNC: 99 MMOL/L (ref 98–107)
CO2: 20 MMOL/L (ref 22–29)
CREAT SERPL-MCNC: 0.6 MG/DL (ref 0.7–1.2)
EOSINOPHILS ABSOLUTE: 0.44 E9/L (ref 0.05–0.5)
EOSINOPHILS RELATIVE PERCENT: 3.2 % (ref 0–6)
GFR AFRICAN AMERICAN: >60
GFR NON-AFRICAN AMERICAN: >60 ML/MIN/1.73
GLUCOSE BLD-MCNC: 143 MG/DL (ref 74–99)
HCT VFR BLD CALC: 28.4 % (ref 37–54)
HEMOGLOBIN: 9.5 G/DL (ref 12.5–16.5)
IMMATURE GRANULOCYTES #: 0.33 E9/L
IMMATURE GRANULOCYTES %: 2.4 % (ref 0–5)
LYMPHOCYTES ABSOLUTE: 1.14 E9/L (ref 1.5–4)
LYMPHOCYTES RELATIVE PERCENT: 8.4 % (ref 20–42)
MCH RBC QN AUTO: 28.6 PG (ref 26–35)
MCHC RBC AUTO-ENTMCNC: 33.5 % (ref 32–34.5)
MCV RBC AUTO: 85.5 FL (ref 80–99.9)
METER GLUCOSE: 150 MG/DL (ref 74–99)
METER GLUCOSE: 162 MG/DL (ref 74–99)
METER GLUCOSE: 171 MG/DL (ref 74–99)
METER GLUCOSE: 208 MG/DL (ref 74–99)
METER GLUCOSE: 216 MG/DL (ref 74–99)
MONOCYTES ABSOLUTE: 0.86 E9/L (ref 0.1–0.95)
MONOCYTES RELATIVE PERCENT: 6.4 % (ref 2–12)
NEUTROPHILS ABSOLUTE: 10.71 E9/L (ref 1.8–7.3)
NEUTROPHILS RELATIVE PERCENT: 79.2 % (ref 43–80)
ORGANISM: ABNORMAL
ORGANISM: ABNORMAL
PDW BLD-RTO: 14 FL (ref 11.5–15)
PLATELET # BLD: 301 E9/L (ref 130–450)
PMV BLD AUTO: 8.9 FL (ref 7–12)
POTASSIUM SERPL-SCNC: 3.8 MMOL/L (ref 3.5–5)
RBC # BLD: 3.32 E12/L (ref 3.8–5.8)
SODIUM BLD-SCNC: 130 MMOL/L (ref 132–146)
TOTAL PROTEIN: 6.2 G/DL (ref 6.4–8.3)
URIC ACID, SERUM: 2.9 MG/DL (ref 3.4–7)
WBC # BLD: 13.5 E9/L (ref 4.5–11.5)
WOUND/ABSCESS: ABNORMAL

## 2022-09-17 PROCEDURE — 85025 COMPLETE CBC W/AUTO DIFF WBC: CPT

## 2022-09-17 PROCEDURE — 6370000000 HC RX 637 (ALT 250 FOR IP): Performed by: INTERNAL MEDICINE

## 2022-09-17 PROCEDURE — 6360000002 HC RX W HCPCS: Performed by: STUDENT IN AN ORGANIZED HEALTH CARE EDUCATION/TRAINING PROGRAM

## 2022-09-17 PROCEDURE — 84550 ASSAY OF BLOOD/URIC ACID: CPT

## 2022-09-17 PROCEDURE — 6370000000 HC RX 637 (ALT 250 FOR IP): Performed by: SPECIALIST

## 2022-09-17 PROCEDURE — 6370000000 HC RX 637 (ALT 250 FOR IP): Performed by: STUDENT IN AN ORGANIZED HEALTH CARE EDUCATION/TRAINING PROGRAM

## 2022-09-17 PROCEDURE — 1200000000 HC SEMI PRIVATE

## 2022-09-17 PROCEDURE — 80053 COMPREHEN METABOLIC PANEL: CPT

## 2022-09-17 PROCEDURE — 2580000003 HC RX 258: Performed by: STUDENT IN AN ORGANIZED HEALTH CARE EDUCATION/TRAINING PROGRAM

## 2022-09-17 PROCEDURE — 36415 COLL VENOUS BLD VENIPUNCTURE: CPT

## 2022-09-17 PROCEDURE — 6360000002 HC RX W HCPCS: Performed by: SPECIALIST

## 2022-09-17 PROCEDURE — 2580000003 HC RX 258: Performed by: SPECIALIST

## 2022-09-17 PROCEDURE — 99232 SBSQ HOSP IP/OBS MODERATE 35: CPT | Performed by: INTERNAL MEDICINE

## 2022-09-17 PROCEDURE — APPSS30 APP SPLIT SHARED TIME 16-30 MINUTES: Performed by: NURSE PRACTITIONER

## 2022-09-17 PROCEDURE — 6360000002 HC RX W HCPCS: Performed by: INTERNAL MEDICINE

## 2022-09-17 PROCEDURE — 2580000003 HC RX 258: Performed by: INTERNAL MEDICINE

## 2022-09-17 PROCEDURE — 82962 GLUCOSE BLOOD TEST: CPT

## 2022-09-17 RX ORDER — LIDOCAINE HYDROCHLORIDE 10 MG/ML
5 INJECTION, SOLUTION INFILTRATION; PERINEURAL ONCE
Status: COMPLETED | OUTPATIENT
Start: 2022-09-17 | End: 2022-09-19

## 2022-09-17 RX ORDER — METRONIDAZOLE 500 MG/1
500 TABLET ORAL EVERY 8 HOURS SCHEDULED
Status: DISCONTINUED | OUTPATIENT
Start: 2022-09-17 | End: 2022-09-26 | Stop reason: HOSPADM

## 2022-09-17 RX ORDER — SODIUM CHLORIDE 0.9 % (FLUSH) 0.9 %
10 SYRINGE (ML) INJECTION PRN
Status: DISCONTINUED | OUTPATIENT
Start: 2022-09-17 | End: 2022-09-26 | Stop reason: HOSPADM

## 2022-09-17 RX ORDER — SODIUM CHLORIDE 0.9 % (FLUSH) 0.9 %
10 SYRINGE (ML) INJECTION EVERY 12 HOURS SCHEDULED
Status: DISCONTINUED | OUTPATIENT
Start: 2022-09-17 | End: 2022-09-26 | Stop reason: HOSPADM

## 2022-09-17 RX ORDER — INSULIN GLARGINE 100 [IU]/ML
5 INJECTION, SOLUTION SUBCUTANEOUS NIGHTLY
Status: DISCONTINUED | OUTPATIENT
Start: 2022-09-17 | End: 2022-09-26 | Stop reason: HOSPADM

## 2022-09-17 RX ORDER — SODIUM CHLORIDE 9 MG/ML
25 INJECTION, SOLUTION INTRAVENOUS PRN
Status: DISCONTINUED | OUTPATIENT
Start: 2022-09-17 | End: 2022-09-26 | Stop reason: HOSPADM

## 2022-09-17 RX ADMIN — GABAPENTIN 300 MG: 300 CAPSULE ORAL at 08:48

## 2022-09-17 RX ADMIN — ENOXAPARIN SODIUM 40 MG: 100 INJECTION SUBCUTANEOUS at 08:48

## 2022-09-17 RX ADMIN — METRONIDAZOLE 500 MG: 500 TABLET ORAL at 15:31

## 2022-09-17 RX ADMIN — CLOPIDOGREL BISULFATE 75 MG: 75 TABLET ORAL at 08:48

## 2022-09-17 RX ADMIN — ASPIRIN 81 MG: 81 TABLET, COATED ORAL at 08:48

## 2022-09-17 RX ADMIN — DIPHENHYDRAMINE HCL 25 MG: 25 TABLET ORAL at 22:39

## 2022-09-17 RX ADMIN — GABAPENTIN 300 MG: 300 CAPSULE ORAL at 20:41

## 2022-09-17 RX ADMIN — ATORVASTATIN CALCIUM 40 MG: 40 TABLET, FILM COATED ORAL at 20:41

## 2022-09-17 RX ADMIN — Medication 9 MG: at 22:39

## 2022-09-17 RX ADMIN — CYCLOBENZAPRINE HYDROCHLORIDE 10 MG: 5 TABLET, FILM COATED ORAL at 20:41

## 2022-09-17 RX ADMIN — WATER 2000 MG: 1 INJECTION INTRAMUSCULAR; INTRAVENOUS; SUBCUTANEOUS at 15:30

## 2022-09-17 RX ADMIN — INSULIN GLARGINE 5 UNITS: 100 INJECTION, SOLUTION SUBCUTANEOUS at 20:42

## 2022-09-17 RX ADMIN — CEFEPIME 2000 MG: 2 INJECTION, POWDER, FOR SOLUTION INTRAVENOUS at 04:57

## 2022-09-17 RX ADMIN — VANCOMYCIN HYDROCHLORIDE 1250 MG: 10 INJECTION, POWDER, LYOPHILIZED, FOR SOLUTION INTRAVENOUS at 11:36

## 2022-09-17 RX ADMIN — Medication 10 ML: at 20:41

## 2022-09-17 RX ADMIN — METRONIDAZOLE 500 MG: 500 TABLET ORAL at 20:41

## 2022-09-17 ASSESSMENT — PAIN DESCRIPTION - LOCATION: LOCATION: FOOT

## 2022-09-17 ASSESSMENT — PAIN SCALES - GENERAL: PAINLEVEL_OUTOF10: 0

## 2022-09-17 NOTE — PROGRESS NOTES
Department of Podiatry  Progress Note    Planned procedure: Left foot partial calcanectomy with debridement to be performed by Dr. Viky Sanderson on Monday, 9/19/2022 to follow prior cases. -NPO midnight Sunday evening    SUBJECTIVE:  Mr. Tha Scott was evaluated at bedside this morning S/P LLE I&D, Debridement (DOS: 9/14/22) No acute events overnight. Patient denies any N/V/D/F/C/SOB/CP and has no other pedal complaints at this time. OBJECTIVE:    Scheduled Meds:   vancomycin  1,250 mg IntraVENous Q12H    gabapentin  300 mg Oral BID    clopidogrel  75 mg Oral Daily    aspirin  81 mg Oral Daily    atorvastatin  40 mg Oral Nightly    cyclobenzaprine  10 mg Oral Nightly    insulin lispro  0-4 Units SubCUTAneous Q4H    cefepime  2,000 mg IntraVENous Q12H    sodium chloride flush  10 mL IntraVENous 2 times per day    enoxaparin  40 mg SubCUTAneous Daily     Continuous Infusions:   dextrose      sodium chloride       PRN Meds:.povidone-iodine, calcium carbonate, melatonin, diphenhydrAMINE, glucose, dextrose bolus **OR** dextrose bolus, glucagon (rDNA), dextrose, sodium chloride flush, sodium chloride, promethazine **OR** ondansetron, polyethylene glycol, acetaminophen **OR** acetaminophen    Allergies   Allergen Reactions    Pcn [Penicillins] Anaphylaxis       BP (!) 150/71   Pulse 78   Temp 97.7 °F (36.5 °C) (Oral)   Resp 24   Ht 5' 11\" (1.803 m)   Wt 192 lb (87.1 kg)   SpO2 94%   BMI 26.78 kg/m²               EXAM:    NEUROLOGICAL EXAMINATION: Protective sensation absent to bilateral lower extremities. VASCULAR EXAMINATION:  DP and PT pulses are palpable. Capillary refill is WNL. Skin temperature is warm to warm from tiibial tuberosity to distal digits. There is absence of hair growth noted bilaterally. DERMATOLOGICAL:  Surgical incision site secondary to infected ulcer noted to the posterior aspect lf the left heel and calf measurea about 20.0x5.5cmx0.5cm with 100% granular tissue. Achilles exposed.  No noted pus or drainage. No tunneling or burrowing noted. Mild skin maceration noted distally. Mild erythema and edema noted. All as pictured below:    MUSCULOSKELETAL:  MMT Deferred. Equinus. No posterior calf pain on palpation            Scheduled Meds:   vancomycin  1,250 mg IntraVENous Q12H    gabapentin  300 mg Oral BID    clopidogrel  75 mg Oral Daily    aspirin  81 mg Oral Daily    atorvastatin  40 mg Oral Nightly    cyclobenzaprine  10 mg Oral Nightly    insulin lispro  0-4 Units SubCUTAneous Q4H    cefepime  2,000 mg IntraVENous Q12H    sodium chloride flush  10 mL IntraVENous 2 times per day    enoxaparin  40 mg SubCUTAneous Daily     Continuous Infusions:   dextrose      sodium chloride       PRN Meds:.povidone-iodine, calcium carbonate, melatonin, diphenhydrAMINE, glucose, dextrose bolus **OR** dextrose bolus, glucagon (rDNA), dextrose, sodium chloride flush, sodium chloride, promethazine **OR** ondansetron, polyethylene glycol, acetaminophen **OR** acetaminophen    RADIOLOGY:  VL LOWER EXTREMITY ARTERIAL SEGMENTAL PRESSURES W PPG BILATERAL   Final Result   In spite of normal bilateral ankle-brachial indices, Doppler and PVR   waveforms suggest at least mild, symmetric bilateral lower extremity arterial   stenosis. Correlation with CTA of the abdomen/pelvis with lower extremity   runoff could be considered for better assessment. XR FOOT LEFT (2 VIEWS)   Final Result   Marked soft tissue swelling in the dorsal aspect of the mid and distal foot. Slight interval improvement of soft tissue ulceration in the heel and   cortical irregularity along the posterior margin of the calcaneus. Question hairline fracture, proximal phalanx of 5th toe. Please correlate   with focal tenderness. XR CHEST 1 VIEW   Final Result   No acute process. There are multiple old left-sided rib fractures.            BP (!) 150/71   Pulse 78   Temp 97.7 °F (36.5 °C) (Oral)   Resp 24   Ht 5' 11\" (1.803 m) Wt 192 lb (87.1 kg)   SpO2 94%   BMI 26.78 kg/m²     LABS:    Recent Labs     09/16/22  0650 09/17/22  0734   WBC 18.8* 13.5*   HGB 9.2* 9.5*   HCT 27.5* 28.4*    301          Recent Labs     09/16/22  0650 09/16/22  1733 09/17/22  0734   *   < > 130*   K 3.4*   < > 3.8   CL 96*   < > 99   CO2 20*   < > 20*   PHOS 1.8*  --   --    BUN 18   < > 17   CREATININE 0.7   < > 0.6*    < > = values in this interval not displayed. Recent Labs     09/16/22  0650 09/17/22  0734   PROT 6.2* 6.2*           ASSESSMENT:  - S/P LLE I&D,Debridement (dos:9/14/22)  -Full-thickness ulcer down to level of exposed bone with exposed tendon left posterior Heel, POA, infected  -Necrotizing infection posterior Achilles. -Uncontrolled insulin dependent type 2 diabetes mellitus with peripheral neuropathy  -Acute on chronic osteomyelitis left posterior heel, POA  -Positive bacteremia Gram-positive cocci in chains  -Peripheral arterial disease s/p arteriogram with angioplasty      PLAN:  - Examined and evaluated  - All labs, imaging, and charts reviewed  - WBC: 13.5  - Pain Control: IV and PO  - Antibiotics as per ID: Vanc, Cefepime  -Arterial studies 9/16/2022: MAHOGANY 1.0 right, 1.4 left. Mild symmetrical bilateral arterial stenosis diminutive flow bilateral digits  - Venous US:Pending  -Wound cultures 9/13/2022: MSSA, group A strep, mixed GPO, mixed GNR  -Surgical path 9/14/2022: Pending  -X-ray left foot 9/13/22: Positive osteomyelitis posterior calcaneal tuber with marked soft tissue swelling dorsal aspect of mid and distal forefoot. Questionable hairline fracture proximal phalanx of digit.  -Betadine packing DSD left foot daily changes   - Prevalon boots bilateral  - Planned procedure: Left foot partial calcanectomy with debridement to be performed by Dr. Mikie Kirby on Monday, 9/19/2022 to follow prior cases.   -NPO midnight Sunday evening    D/W: Janusz Munson DPM FACFAS  Fellowship-Trained Foot and Ankle Surgeon  Diplomate, [employer] Board [employer] Foot [employer] Ankle [employer]  [phone]     Thank you for involving podiatry in this patients care. Please do not hesitate to call with any questions or concerns.       Littie Najjar Podiatry PGY3  9/17/2022   12:27 PM

## 2022-09-17 NOTE — PROGRESS NOTES
NAME: Azael Caruso  MR:  [id number]  :   1960  DATE OF SERVICE:22    This is a face to face encounter with Azael Caruso 60 y.o. male on 22  Elements of this note, including Diagnosis,  Interval History, Past Medical/Surgical/Family/Social Histories, ROS, physical exam, and Assessment and Plan were copied and pasted from Previous. Updates have been made where noted and reflect current exam and medical decision making from the DOS of this encounter. CHIEF COMPLAINT     ID following for   Chief Complaint   Patient presents with    Fatigue     Onset several days-hx left foot diabetic wound     HISTORY OF PRESENT ILLNESS     Pt seen and examined  22  In bed has no c/o RA  Dressing was changed by podiatry   AFEBIRLE AHZ61.0 CR0.6    Patient is tolerating medications. No reported adverse drug reactions. REVIEW OF SYSTEMS     As stated above in the chief complaint, otherwise negative.   CURRENT MEDICATIONS     Current Facility-Administered Medications:     vancomycin (VANCOCIN) 1,250 mg in dextrose 5 % 250 mL IVPB, 1,250 mg, IntraVENous, Q12H, Arabella Lu MD, Stopped at 22 1306    povidone-iodine (BETADINE) 10 % external solution, , Topical, PRN, Da Fountain MD    calcium carbonate (TUMS) chewable tablet 500 mg, 500 mg, Oral, TID PRN, Catheryn Barthel Raspovic, DO, 500 mg at 09/15/22 1245    gabapentin (NEURONTIN) capsule 300 mg, 300 mg, Oral, BID, Yeison Ahammad, DPM, 300 mg at 22 0848    clopidogrel (PLAVIX) tablet 75 mg, 75 mg, Oral, Daily, Yeison Ahammad, DPM, 75 mg at 22 0848    aspirin EC tablet 81 mg, 81 mg, Oral, Daily, Yeison Ahammad, DPM, 81 mg at 22 0848    melatonin tablet 9 mg, 9 mg, Oral, Nightly PRN, Yeison Ahammad, DPM, 9 mg at 22 2134    diphenhydrAMINE (BENADRYL) tablet 25 mg, 25 mg, Oral, Q6H PRN, Yeison Ahammad, DPM    atorvastatin (LIPITOR) tablet 40 mg, 40 mg, Oral, Nightly, Yeison Flanagan DPM, 40 mg at 22    cyclobenzaprine (FLEXERIL) tablet 10 mg, 10 mg, Oral, Nightly, Yeison Ahammad, DPM, 10 mg at 22    glucose chewable tablet 16 g, 4 tablet, Oral, PRN, Yeison Ahammad, DPM    dextrose bolus 10% 125 mL, 125 mL, IntraVENous, PRN **OR** dextrose bolus 10% 250 mL, 250 mL, IntraVENous, PRN, Yeison Ahammad, DPM    glucagon (rDNA) injection 1 mg, 1 mg, SubCUTAneous, PRN, Yeison Ahammad, DPM    dextrose 10 % infusion, , IntraVENous, Continuous PRN, Yeison Ahammad, DPM    insulin lispro (HUMALOG) injection vial 0-4 Units, 0-4 Units, SubCUTAneous, Q4H, Yeison Ahammad, DPM    cefepime (MAXIPIME) 2,000 mg in sodium chloride 0.9 % 50 mL IVPB (Qfuw3Off), 2,000 mg, IntraVENous, Q12H, Kike Soria Ahammad, DPM, Stopped at 22 0857    sodium chloride flush 0.9 % injection 10 mL, 10 mL, IntraVENous, 2 times per day, Yeison Ahammad, DPM, 10 mL at 22    sodium chloride flush 0.9 % injection 10 mL, 10 mL, IntraVENous, PRN, Yeison Ahammad, DPM    0.9 % sodium chloride infusion, , IntraVENous, PRN, Yeison Ahammad, DPM    enoxaparin (LOVENOX) injection 40 mg, 40 mg, SubCUTAneous, Daily, Yeison Ahammad, DPM, 40 mg at 22 0848    promethazine (PHENERGAN) tablet 12.5 mg, 12.5 mg, Oral, Q6H PRN **OR** ondansetron (ZOFRAN) injection 4 mg, 4 mg, IntraVENous, Q6H PRN, Yeison Ahammad, DPM, 4 mg at 09/15/22 1652    polyethylene glycol (GLYCOLAX) packet 17 g, 17 g, Oral, Daily PRN, Yeison Ahammad, DPM    acetaminophen (TYLENOL) tablet 650 mg, 650 mg, Oral, Q6H PRN **OR** acetaminophen (TYLENOL) suppository 650 mg, 650 mg, Rectal, Q6H PRN, Yeison Flanagan DPM  PHYSICAL EXAM     BP (!) 150/71   Pulse 78   Temp 97.7 °F (36.5 °C) (Oral)   Resp 24   Ht 5' 11\" (1.803 m)   Wt 192 lb (87.1 kg)   SpO2 94%   BMI 26.78 kg/m²   Temp  Av.1 °F (36.7 °C)  Min: 97.7 °F (36.5 °C)  Max: 98.4 °F (36.9 °C)  Constitutional:  The patient is awake, alert   Skin:    Warm and dry. HEENT:     AT/NC     Chest:   No use of accessory muscles to breathe. Symmetrical expansion. Cardiovascular:  S1 and S2 are rhythmic and regular. No murmurs appreciated. Abdomen:   Positive bowel sounds to auscultation. Benign to palpation. DISTENDED  Extremities:   No clubbing, no cyanosis,  edema. LEFT LE ACED          CNS    AAxO   Lines:    Urinary Catheter 09/14/22 Cole (Active)   Site Assessment Swelling 09/15/22 0811   Urine Color Yellow 09/15/22 0811   Urine Appearance Clear 09/15/22 0811   Collection Container Standard 09/15/22 0811   Catheter Best Practices  Drainage tube clipped to bed;Catheter secured to thigh; Bag below bladder;Bag not on floor; Lack of dependent loop in tubing;Drainage bag less than half full 09/15/22 0811   Status Draining 09/15/22 0811   Output (mL) 550 mL 09/15/22 0650        Peripheral Intravenous Line:  Peripheral IV 09/15/22 Proximal;Right Forearm (Active)   Site Assessment Clean, dry & intact 09/15/22 0800   Line Status Infusing 09/15/22 0800   Line Care Connections checked and tightened 09/15/22 0015   Phlebitis Assessment No symptoms 09/15/22 0800   Infiltration Assessment 0 09/15/22 0800   Dressing Status Clean, dry & intact 09/15/22 0800   Dressing Type Transparent 09/15/22 0800        Drain(s):  Urinary Catheter 09/14/22 Cole (Active)   Site Assessment Swelling 09/15/22 0811   Urine Color Yellow 09/15/22 0811   Urine Appearance Clear 09/15/22 0811   Collection Container Standard 09/15/22 0811   Catheter Best Practices  Drainage tube clipped to bed;Catheter secured to thigh; Bag below bladder;Bag not on floor; Lack of dependent loop in tubing;Drainage bag less than half full 09/15/22 0811   Status Draining 09/15/22 0811   Output (mL) 550 mL 09/15/22 0650           DIAGNOSTIC RESULTS   Radiology:    Recent Labs     09/15/22  0725 09/16/22  0650 09/17/22  0734   WBC 19.2* 18.8* 13.5*   RBC 3.24* 3.19* 3.32*   HGB 9.5* 9.2* 9.5*   HCT 28.0* 27.5* 28.4*   MCV 86.4 86.2 85.5   MCH 29.3 28.8 28.6   MCHC 33.9 33.5 33.5   RDW 13.3 13.7 14.0   PLT 289 272 301   MPV 9.1 8.7 8.9       Recent Labs     09/15/22  0725 09/15/22  1606 09/16/22  0650 09/16/22  1733 09/17/22  0734   *   < > 127* 129* 130*   K 3.4*   < > 3.4* 4.0 3.8   CL 94*   < > 96* 97* 99   CO2 19*   < > 20* 19* 20*   BUN 18   < > 18 18 17   CREATININE 0.8   < > 0.7 0.7 0.6*   GLUCOSE 134*   < > 132* 169* 143*   PROT 6.3*  --  6.2*  --  6.2*   LABALBU 2.5*  --  2.3*  --  2.1*   CALCIUM 8.0*   < > 8.0* 8.3* 8.1*   BILITOT 0.7  --  0.8  --  0.7   ALKPHOS 82  --  94  --  100   AST 30  --  36  --  39   ALT 17  --  23  --  26    < > = values in this interval not displayed. Lab Results   Component Value Date    CRP 24.9 (H) 09/13/2022    CRP 2.3 (H) 02/14/2021     Lab Results   Component Value Date    SEDRATE 90 (H) 09/13/2022    SEDRATE 87 (H) 02/14/2021     Recent Labs     09/15/22  0725 09/16/22  0650 09/17/22  0734   AST 30 36 39   ALT 17 23 26       Lab Results   Component Value Date/Time    CHOL 164 03/01/2016 08:10 AM    TRIG 170 03/01/2016 08:10 AM    HDL 43 03/01/2016 08:10 AM    LDLCALC 87 03/01/2016 08:10 AM    LABVLDL 34 03/01/2016 08:10 AM     Lab Results   Component Value Date/Time    VITD25 12 (L) 12/28/2020 09:15 AM       Microbiology:   No results for input(s): COVID19 in the last 72 hours.   Lab Results   Component Value Date/Time    BC 24 Hours no growth 09/15/2022 07:24 AM    BC 5 Days no growth 02/16/2021 04:37 PM    BC Previously positive blood culture called 02/14/2021 07:07 PM    [facility]  02/14/2021 07:07 PM     Refer to previous culture for susceptibility results  of CXBL2 (LAC) collected 02/14/2021 at 19:07      BLOODCULT2 24 Hours no growth 09/15/2022 07:25 AM    BLOODCULT2 Previously positive blood culture called 09/13/2022 02:59 PM    BLOODCULT2  09/13/2022 02:59 PM     Refer to previous culture CXBL 9/13/2022 1459 for susceptibility results    ORG BHS Group A (Strep pyogenes) 09/13/2022 02:59 PM    ORG BHS Group A (Strep pyogenes) 09/13/2022 02:59 PM    ORG Staphylococcus aureus 09/13/2022 02:59 PM    ORG Strep pyogenes (Beta Strep Group A) 09/13/2022 02:59 PM     WOUND/ABSCESS   Date Value Ref Range Status   09/13/2022 (A)  Preliminary    Additional growth present, also evaluating for;  Mixed Gram negative rods  Mixed Gram positive organisms  Mixed laura isolated. Further workup and sensitivity testing  is not routinely indicated and will not be performed. Mixed laura isolated includes:  Mixed Gram negative rods  Gram positive rods catalase negative     09/13/2022   Preliminary    Heavy growth  Sensitivity to follow  This isolate is presumed to be resistant based on the  detection of inducible Clindamycin resistance. Clindamycin  may still be effective in some patients. 09/13/2022 Heavy growth  Sensitivity to follow    Preliminary     Smear, Respiratory   Date Value Ref Range Status   01/01/2021   Final    Group 6: <25 PMN's/LPF and <25 Epithelial cells/LPF  Rare Polymorphonuclear leukocytes  Epithelial cells not seen  No organisms seen           Component Value Date/Time    AFBCX No growth after 6 weeks of incubation. 02/16/2021 0732    AFBCX No growth after 6 weeks of incubation. 02/16/2021 0730    FUNGSM No Fungal elements seen 02/16/2021 0732    LABFUNG No growth after 4 weeks of incubation. 02/16/2021 0732    LABFUNG No growth after 4 weeks of incubation. 02/16/2021 0730       MRSA Culture Only   Date Value Ref Range Status   12/26/2020 Methicillin resistant Staph aureus not isolated  Final     CULTURE, RESPIRATORY   Date Value Ref Range Status   01/01/2021 Oral Pharyngeal Laura absent  Growth not present    Final     Recent Labs     09/14/22  1641   CXSURG Growth not present, incubation continues       No results for input(s): WNDABS, ORG in the last 72 hours.        FINAL IMPRESSION    Pt had   Chief Complaint   Patient presents with    Fatigue     Onset several days-hx left foot diabetic wound    Admitted for Lactic acidosis [E87.2]  HAS SEPSIS  -Streptococcus species SEPTICEMIA 9/13  -LEUKOCYTOSIS   -LEFT LE CELLULITIS  -LEFT HEEL OM     PROCEDURES:  1. Incision and drainage, left calcaneus bone. 2.  Incision and drainage posterior left leg. For l  secondary surgical intervention, partial calcanectomy as early as next week. 9/14 tissue cx GVR    9/13 MSSA/strep pyogen\es  vancomycin (VANCOCIN) 1,000 mg in sodium chloride 0.9 % 250 mL IVPB    cefepime (MAXIPIME) 2,000 mg in sodium chloride 0.9 % 50 mL IVPB (Ecam0Crs), Q12H     Place picc  Adjusted atbx    To follow ordered labs, cultures and imaging. Imaging and labs were reviewed per medical records. The patient was educated about the diagnosis, prognosis, indications, risks and benefits of treatment. An opportunity to ask questions was given to the patient/FAMILY. Thank you for involving me in the care of Emilia Balderrama I will continue to follow. Please do not hesitate to call for any questions or concerns.     Electronically signed by Andrei Snyder MD on 9/17/2022 at 1:40 PM

## 2022-09-17 NOTE — PLAN OF CARE
Problem: Skin/Tissue Integrity  Goal: Absence of new skin breakdown  Description: 1. Monitor for areas of redness and/or skin breakdown  2. Assess vascular access sites hourly  3. Every 4-6 hours minimum:  Change oxygen saturation probe site  4. Every 4-6 hours:  If on nasal continuous positive airway pressure, respiratory therapy assess nares and determine need for appliance change or resting period.   9/17/2022 1020 by Jam Dejesus RN  Outcome: Progressing     Problem: Safety - Adult  Goal: Free from fall injury  9/17/2022 1020 by Jam Dejeuss RN  Outcome: Progressing     Problem: ABCDS Injury Assessment  Goal: Absence of physical injury  9/17/2022 1020 by Jam Dejesus RN  Outcome: Progressing     Problem: Chronic Conditions and Co-morbidities  Goal: Patient's chronic conditions and co-morbidity symptoms are monitored and maintained or improved  9/17/2022 1020 by Jam Dejesus RN  Outcome: Progressing

## 2022-09-17 NOTE — PROGRESS NOTES
[address] [place], [place] [address]ist   Progress Note    Admitting Date and Time: 9/13/2022 12:00 PM  Admit Dx: Lactic acidosis [E87.2]  Septicemia ([address] [A41.9]  Wound infection [T14. 8XXA, L08.9]  Wound cellulitis [L03.90]  Sepsis ([address] [A41.9]    Subjective:    Pt feels well today. Pt states he feels well today. States podiatry for 2nd procedure so timing TBD. State he hopes its sooner then later. Continues on Abx therapy tolerating well. Will likely require PICC line prior to DC.      Per RN: No new concerns noted    ROS: denies fever, chills, cp, sob, n/v, HA unless stated above.     vancomycin  1,000 mg IntraVENous Q12H    gabapentin  300 mg Oral BID    clopidogrel  75 mg Oral Daily    aspirin  81 mg Oral Daily    atorvastatin  40 mg Oral Nightly    cyclobenzaprine  10 mg Oral Nightly    insulin lispro  0-4 Units SubCUTAneous Q4H    cefepime  2,000 mg IntraVENous Q12H    sodium chloride flush  10 mL IntraVENous 2 times per day    enoxaparin  40 mg SubCUTAneous Daily     povidone-iodine, , PRN  calcium carbonate, 500 mg, TID PRN  melatonin, 9 mg, Nightly PRN  diphenhydrAMINE, 25 mg, Q6H PRN  glucose, 4 tablet, PRN  dextrose bolus, 125 mL, PRN   Or  dextrose bolus, 250 mL, PRN  glucagon (rDNA), 1 mg, PRN  dextrose, , Continuous PRN  sodium chloride flush, 10 mL, PRN  sodium chloride, , PRN  promethazine, 12.5 mg, Q6H PRN   Or  ondansetron, 4 mg, Q6H PRN  polyethylene glycol, 17 g, Daily PRN  acetaminophen, 650 mg, Q6H PRN   Or  acetaminophen, 650 mg, Q6H PRN         Objective:    BP (!) 149/79   Pulse 84   Temp 98.4 °F (36.9 °C) (Oral)   Resp 18   Ht 5' 11\" (1.803 m)   Wt 192 lb (87.1 kg)   SpO2 97%   BMI 26.78 kg/m²   General Appearance: alert and oriented to person, place and time and in no acute distress  Skin: warm and dry  Head: normocephalic and atraumatic  Eyes: pupils equal, round, and reactive to light, extraocular eye movements intact, conjunctivae normal  Neck: neck supple and non tender without mass   Pulmonary/Chest: clear to auscultation bilaterally- no wheezes, rales or rhonchi, normal air movement, no respiratory distress  Cardiovascular: normal rate, normal S1 and S2 and no RGM  Abdomen: soft, non-tender, non-distended, normal bowel sounds  Extremities: no cyanosis, no clubbing and no edema. LLE Surgical dressing CDI. Neurologic: no cranial nerve deficit and speech normal      Recent Labs     09/15/22  1606 09/16/22  0650 09/16/22  1733   * 127* 129*   K 3.6 3.4* 4.0   CL 93* 96* 97*   CO2 20* 20* 19*   BUN 18 18 18   CREATININE 0.7 0.7 0.7   GLUCOSE 149* 132* 169*   CALCIUM 8.1* 8.0* 8.3*       Recent Labs     09/15/22  0725 09/16/22  0650   ALKPHOS 82 94   PROT 6.3* 6.2*   LABALBU 2.5* 2.3*   BILITOT 0.7 0.8   AST 30 36   ALT 17 23       Recent Labs     09/15/22  0725 09/16/22  0650   WBC 19.2* 18.8*   RBC 3.24* 3.19*   HGB 9.5* 9.2*   HCT 28.0* 27.5*   MCV 86.4 86.2   MCH 29.3 28.8   MCHC 33.9 33.5   RDW 13.3 13.7    272   MPV 9.1 8.7            Radiology:   VL LOWER EXTREMITY ARTERIAL SEGMENTAL PRESSURES W PPG BILATERAL   Final Result   In spite of normal bilateral ankle-brachial indices, Doppler and PVR   waveforms suggest at least mild, symmetric bilateral lower extremity arterial   stenosis. Correlation with CTA of the abdomen/pelvis with lower extremity   runoff could be considered for better assessment. XR FOOT LEFT (2 VIEWS)   Final Result   Marked soft tissue swelling in the dorsal aspect of the mid and distal foot. Slight interval improvement of soft tissue ulceration in the heel and   cortical irregularity along the posterior margin of the calcaneus. Question hairline fracture, proximal phalanx of 5th toe. Please correlate   with focal tenderness. XR CHEST 1 VIEW   Final Result   No acute process. There are multiple old left-sided rib fractures.              Assessment:  Principal Problem:    Sepsis due to group A beta-hemolytic Streptococcus ([facility]  Active Problems:    Diabetes mellitus ([facility]    Osteomyelitis of left lower extremity (HCC)    Alcohol abuse    Coronary artery disease involving native coronary artery of native heart without angina pectoris    Hyponatremia    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 2 diabetes mellitus, with necrosis of bone ([facility]  Resolved Problems:    * No resolved hospital problems. *      Plan:  Sepsis- 2/2 left calcaneal Osteomyelitis/Strep Pyogenes Bacteremia. POA(Leukocytosis/Tachycardia/Elevated Lactic Acid). 9/14 S/P I&D Left Calcaneus/Posterior leg. Received 3L NSS bolus/Cefepime/Bactrim/Vancomycin  in ED Course. Wound Cultures Staphylococcus Aureus. BC Streptococcus Species. Repeat BC no growth thus far. Continue Vancomycin Day 4 cefepime Day 5. ID/Podiatry input appreciated. Left Calcaneal Osteomyelitis- 9/14 S/P I&D Left Calcaneus/Posterior leg. PICC line placement. Vascular Studies suggest mild symmetric BLE arteriole stenosis. Vancomycin/Cefepime. Betadine soaked kerlix, DSD. Daily dressing changes per Podiatry. Prevalon boots. Repeat surgical intervention/partial calcanectomy early next week timing TBD. ID/Podiatry input appreciated  Lactic Acidosis- Resolved. 2/2 above. IVF Hydration Lactic Acid 4.3>4.1>1.9. Leukocytosis- 2/2 Above. Down trending. WBC 33.6>25.9>19.2>18.8>13.5. Wound Cultures Staphylococcus Aureus. BC Streptococcus Species. Repeat BC no growth thus far. Continue Vancomycin Day 4 cefepime Day 5. Will likely require PICC placement prior to DC. ID input appreciated. Hyponatremia- 2/2 Hypovolemia/in the setting of sepsis. Drinks 6-8 Beers daily. Na+ 129>130. serum osmolality 267. urine Sodium 68. IVF Hydration DC. Follow  Dm II- A1C in am. Continue SSI/Carb Control Diet/Hypoglycemic protocol. Follow adjust as needed. HTN- HCTZ held. Follow adjust as needed. HLD-Continue Atorvastatin. CAD- Continue Plavix/Statin therapy.     Hx CVA(2015/2017)  Disposition- LTACH/HHC at DC. Pt would prefer to DC home, however is open to [facility] placement. Will likely require PICC line for long term Abx treatment. Repeat surgical intervention/partial calcanectomy early next week timing TBD. Await ID/Podiatry plan. Case Management following. NOTE: This report was transcribed using voice recognition software. Every effort was made to ensure accuracy; however, inadvertent computerized transcription errors may be present. Electronically signed by Ti Franklin CNP on 9/17/2022 at 7:14 AM

## 2022-09-17 NOTE — PROGRESS NOTES
Pharmacy Consultation Note  (Antibiotic Dosing and Monitoring)    Initial consult date: 9/14/22  Consulting physician/provider: Samina Escamilla MD  Drug: Vancomycin  Indication: Skin and Soft Tissue Infections    Age/  Gender Height Weight IBW  Allergy Information   62 y.o./male 5' 11\" (180.3 cm) 192 lb (87.1 kg)     Ideal body weight: 75.3 kg (166 lb 0.1 oz)  Adjusted ideal body weight: 80 kg (176 lb 6.5 oz)   Pcn [penicillins]      Renal Function:  Recent Labs     09/15/22  1606 09/16/22  0650 09/16/22  1733   BUN 18 18 18   CREATININE 0.7 0.7 0.7       Intake/Output Summary (Last 24 hours) at 9/17/2022 0807  Last data filed at 9/17/2022 0322  Gross per 24 hour   Intake --   Output 900 ml   Net -900 ml       Vancomycin Monitoring:  Trough:    Recent Labs     09/15/22  0725   VANCOTROUGH 10.2     Random:  No results for input(s): VANCORANDOM in the last 72 hours.     Recent Labs     09/15/22  0725   BLOODCULT2 24 Hours no growth        Historical Cultures:  Organism   Date Value Ref Range Status   09/13/2022 BHS Group A (Strep pyogenes) (A)  Final   09/13/2022 BHS Group A (Strep pyogenes) (A)  Final   09/13/2022 Staphylococcus aureus (A)  Preliminary   09/13/2022 Strep pyogenes (Beta Strep Group A) (A)  Preliminary     Recent Labs     09/15/22  0724   BC 24 Hours no growth     Vancomycin Administration Times:  Recent vancomycin administrations                     vancomycin (VANCOCIN) 1,000 mg in sodium chloride 0.9 % 250 mL IVPB ([id number]) (mg) 1,000 mg New Bag 09/16/22 2206     1,000 mg New Bag  1102     1,000 mg New Bag 09/15/22 2151    vancomycin (VANCOCIN) 1,000 mg in sodium chloride 0.9 % 250 mL IVPB ([id number]) (mg) 1,000 mg New Bag 09/15/22 1032     1,000 mg New Bag  0027    vancomycin (VANCOCIN) 1,750 mg in dextrose 5 % 500 mL IVPB (mg) 1,750 mg New Bag 09/14/22 0932               Assessment:  Patient is a 60 y.o. male who has been initiated on vancomycin  Estimated Creatinine Clearance: 115 mL/min (based on SCr of 0.7 mg/dL). To dose vancomycin, pharmacy will be utilizing [employer] calculation software for goal AUC/ANNELIESE 400-600 mg/L-hr  9/17: No SCr today, ordered creatine once today. estAUC/ANNELIESE 365 with current model, patient baseline SCr ~0.7 mg/dL.     Plan:  Increase Vancomycin 1250 mg IV every 12 hours   Will check vancomycin level when appropriate  Will continue to monitor renal function   Clinical pharmacy to follow    Fariba Arce, [address] 8:08 AM

## 2022-09-18 LAB
ANAEROBIC CULTURE: ABNORMAL
ANAEROBIC CULTURE: ABNORMAL
ANION GAP SERPL CALCULATED.3IONS-SCNC: 10 MMOL/L (ref 7–16)
BASOPHILS ABSOLUTE: 0.1 E9/L (ref 0–0.2)
BASOPHILS RELATIVE PERCENT: 0.9 % (ref 0–2)
BUN BLDV-MCNC: 16 MG/DL (ref 6–23)
BURR CELLS: ABNORMAL
CALCIUM SERPL-MCNC: 8.5 MG/DL (ref 8.6–10.2)
CHLORIDE BLD-SCNC: 100 MMOL/L (ref 98–107)
CO2: 19 MMOL/L (ref 22–29)
CREAT SERPL-MCNC: 0.7 MG/DL (ref 0.7–1.2)
EOSINOPHILS ABSOLUTE: 0.19 E9/L (ref 0.05–0.5)
EOSINOPHILS RELATIVE PERCENT: 1.7 % (ref 0–6)
GFR AFRICAN AMERICAN: >60
GFR NON-AFRICAN AMERICAN: >60 ML/MIN/1.73
GLUCOSE BLD-MCNC: 161 MG/DL (ref 74–99)
HCT VFR BLD CALC: 28.7 % (ref 37–54)
HEMOGLOBIN: 9.6 G/DL (ref 12.5–16.5)
LYMPHOCYTES ABSOLUTE: 0.79 E9/L (ref 1.5–4)
LYMPHOCYTES RELATIVE PERCENT: 7 % (ref 20–42)
MCH RBC QN AUTO: 28.7 PG (ref 26–35)
MCHC RBC AUTO-ENTMCNC: 33.4 % (ref 32–34.5)
MCV RBC AUTO: 85.9 FL (ref 80–99.9)
METER GLUCOSE: 146 MG/DL (ref 74–99)
METER GLUCOSE: 153 MG/DL (ref 74–99)
METER GLUCOSE: 154 MG/DL (ref 74–99)
METER GLUCOSE: 157 MG/DL (ref 74–99)
METER GLUCOSE: 160 MG/DL (ref 74–99)
MONOCYTES ABSOLUTE: 0.45 E9/L (ref 0.1–0.95)
MONOCYTES RELATIVE PERCENT: 4.3 % (ref 2–12)
NEUTROPHILS ABSOLUTE: 9.72 E9/L (ref 1.8–7.3)
NEUTROPHILS RELATIVE PERCENT: 86.1 % (ref 43–80)
ORGANISM: ABNORMAL
ORGANISM: ABNORMAL
PDW BLD-RTO: 14.1 FL (ref 11.5–15)
PLATELET # BLD: 356 E9/L (ref 130–450)
PMV BLD AUTO: 9.3 FL (ref 7–12)
POIKILOCYTES: ABNORMAL
POTASSIUM SERPL-SCNC: 3.9 MMOL/L (ref 3.5–5)
RBC # BLD: 3.34 E12/L (ref 3.8–5.8)
SODIUM BLD-SCNC: 129 MMOL/L (ref 132–146)
WBC # BLD: 11.3 E9/L (ref 4.5–11.5)

## 2022-09-18 PROCEDURE — 2580000003 HC RX 258: Performed by: SPECIALIST

## 2022-09-18 PROCEDURE — 36415 COLL VENOUS BLD VENIPUNCTURE: CPT

## 2022-09-18 PROCEDURE — 1200000000 HC SEMI PRIVATE

## 2022-09-18 PROCEDURE — 6370000000 HC RX 637 (ALT 250 FOR IP): Performed by: STUDENT IN AN ORGANIZED HEALTH CARE EDUCATION/TRAINING PROGRAM

## 2022-09-18 PROCEDURE — 6370000000 HC RX 637 (ALT 250 FOR IP): Performed by: SPECIALIST

## 2022-09-18 PROCEDURE — 82962 GLUCOSE BLOOD TEST: CPT

## 2022-09-18 PROCEDURE — APPSS30 APP SPLIT SHARED TIME 16-30 MINUTES: Performed by: NURSE PRACTITIONER

## 2022-09-18 PROCEDURE — 6360000002 HC RX W HCPCS: Performed by: STUDENT IN AN ORGANIZED HEALTH CARE EDUCATION/TRAINING PROGRAM

## 2022-09-18 PROCEDURE — 85025 COMPLETE CBC W/AUTO DIFF WBC: CPT

## 2022-09-18 PROCEDURE — 80048 BASIC METABOLIC PNL TOTAL CA: CPT

## 2022-09-18 PROCEDURE — 99232 SBSQ HOSP IP/OBS MODERATE 35: CPT | Performed by: INTERNAL MEDICINE

## 2022-09-18 PROCEDURE — 6370000000 HC RX 637 (ALT 250 FOR IP): Performed by: INTERNAL MEDICINE

## 2022-09-18 PROCEDURE — 6360000002 HC RX W HCPCS: Performed by: SPECIALIST

## 2022-09-18 RX ORDER — METRONIDAZOLE 500 MG/1
500 TABLET ORAL EVERY 8 HOURS SCHEDULED
Qty: 126 TABLET | Refills: 0 | Status: SHIPPED | OUTPATIENT
Start: 2022-09-18 | End: 2022-10-30

## 2022-09-18 RX ADMIN — ATORVASTATIN CALCIUM 40 MG: 40 TABLET, FILM COATED ORAL at 19:54

## 2022-09-18 RX ADMIN — INSULIN GLARGINE 5 UNITS: 100 INJECTION, SOLUTION SUBCUTANEOUS at 19:55

## 2022-09-18 RX ADMIN — WATER 2000 MG: 1 INJECTION INTRAMUSCULAR; INTRAVENOUS; SUBCUTANEOUS at 15:25

## 2022-09-18 RX ADMIN — GABAPENTIN 300 MG: 300 CAPSULE ORAL at 09:29

## 2022-09-18 RX ADMIN — METRONIDAZOLE 500 MG: 500 TABLET ORAL at 15:25

## 2022-09-18 RX ADMIN — CALCIUM CARBONATE (ANTACID) CHEW TAB 500 MG 500 MG: 500 CHEW TAB at 18:39

## 2022-09-18 RX ADMIN — GABAPENTIN 300 MG: 300 CAPSULE ORAL at 19:54

## 2022-09-18 RX ADMIN — Medication 10 ML: at 09:32

## 2022-09-18 RX ADMIN — ENOXAPARIN SODIUM 40 MG: 100 INJECTION SUBCUTANEOUS at 09:29

## 2022-09-18 RX ADMIN — METRONIDAZOLE 500 MG: 500 TABLET ORAL at 20:27

## 2022-09-18 RX ADMIN — METRONIDAZOLE 500 MG: 500 TABLET ORAL at 05:13

## 2022-09-18 RX ADMIN — Medication 10 ML: at 19:58

## 2022-09-18 RX ADMIN — ASPIRIN 81 MG: 81 TABLET, COATED ORAL at 09:29

## 2022-09-18 RX ADMIN — CYCLOBENZAPRINE HYDROCHLORIDE 10 MG: 5 TABLET, FILM COATED ORAL at 19:54

## 2022-09-18 ASSESSMENT — PAIN SCALES - GENERAL: PAINLEVEL_OUTOF10: 0

## 2022-09-18 NOTE — PROGRESS NOTES
Department of Podiatry  Progress Note    Planned procedure: Left foot partial calcanectomy with debridement to be performed by Dr. Luis Fernando Cotto on Monday, 9/19/2022 to follow prior cases. -NPO midnight Sunday evening    SUBJECTIVE:  Mr. Ang uGillaume was evaluated at bedside this morning S/P LLE I&D, Debridement (DOS: 9/14/22) No acute events overnight. Patient denies any N/V/D/F/C/SOB/CP and has no other pedal complaints at this time. OBJECTIVE:    Scheduled Meds:   cefTRIAXone (ROCEPHIN) IV  2,000 mg IntraVENous Q24H    metroNIDAZOLE  500 mg Oral 3 times per day    lidocaine 1 % injection  5 mL IntraDERmal Once    sodium chloride flush  10 mL IntraVENous 2 times per day    insulin glargine  5 Units SubCUTAneous Nightly    gabapentin  300 mg Oral BID    [Held by provider] clopidogrel  75 mg Oral Daily    aspirin  81 mg Oral Daily    atorvastatin  40 mg Oral Nightly    cyclobenzaprine  10 mg Oral Nightly    insulin lispro  0-4 Units SubCUTAneous Q4H    enoxaparin  40 mg SubCUTAneous Daily     Continuous Infusions:   sodium chloride      dextrose      sodium chloride       PRN Meds:.sodium chloride flush, sodium chloride, povidone-iodine, calcium carbonate, melatonin, diphenhydrAMINE, glucose, dextrose bolus **OR** dextrose bolus, glucagon (rDNA), dextrose, sodium chloride, promethazine **OR** ondansetron, polyethylene glycol, acetaminophen **OR** acetaminophen    Allergies   Allergen Reactions    Pcn [Penicillins] Anaphylaxis       /61   Pulse 77   Temp 97.5 °F (36.4 °C) (Oral)   Resp 17   Ht 5' 11\" (1.803 m)   Wt 192 lb (87.1 kg)   SpO2 90%   BMI 26.78 kg/m²               EXAM:    NEUROLOGICAL EXAMINATION: Protective sensation absent to bilateral lower extremities. VASCULAR EXAMINATION:  DP and PT pulses are palpable. Capillary refill is WNL. Skin temperature is warm to warm from tiibial tuberosity to distal digits. There is absence of hair growth noted bilaterally.     DERMATOLOGICAL:  Surgical incision site secondary to infected ulcer noted to the posterior aspect lf the left heel and calf measures 20.0x5.5cmx0.5cm with fiber granular wound bed, focal proximal portion of Achilles exposed, clean noninfected at this time. No active drainage, no purulence. . No tunneling or burrowing noted. Mild skin maceration noted distally. Mild erythema and edema improving since surgery. MUSCULOSKELETAL:  MMT Deferred. Equinus. No posterior calf pain on palpation. Positive tenderness palpation wound bed           Scheduled Meds:   cefTRIAXone (ROCEPHIN) IV  2,000 mg IntraVENous Q24H    metroNIDAZOLE  500 mg Oral 3 times per day    lidocaine 1 % injection  5 mL IntraDERmal Once    sodium chloride flush  10 mL IntraVENous 2 times per day    insulin glargine  5 Units SubCUTAneous Nightly    gabapentin  300 mg Oral BID    [Held by provider] clopidogrel  75 mg Oral Daily    aspirin  81 mg Oral Daily    atorvastatin  40 mg Oral Nightly    cyclobenzaprine  10 mg Oral Nightly    insulin lispro  0-4 Units SubCUTAneous Q4H    enoxaparin  40 mg SubCUTAneous Daily     Continuous Infusions:   sodium chloride      dextrose      sodium chloride       PRN Meds:.sodium chloride flush, sodium chloride, povidone-iodine, calcium carbonate, melatonin, diphenhydrAMINE, glucose, dextrose bolus **OR** dextrose bolus, glucagon (rDNA), dextrose, sodium chloride, promethazine **OR** ondansetron, polyethylene glycol, acetaminophen **OR** acetaminophen    RADIOLOGY:  VL LOWER EXTREMITY ARTERIAL SEGMENTAL PRESSURES W PPG BILATERAL   Final Result   In spite of normal bilateral ankle-brachial indices, Doppler and PVR   waveforms suggest at least mild, symmetric bilateral lower extremity arterial   stenosis. Correlation with CTA of the abdomen/pelvis with lower extremity   runoff could be considered for better assessment. XR FOOT LEFT (2 VIEWS)   Final Result   Marked soft tissue swelling in the dorsal aspect of the mid and distal foot.       Slight interval improvement of soft tissue ulceration in the heel and   cortical irregularity along the posterior margin of the calcaneus. Question hairline fracture, proximal phalanx of 5th toe. Please correlate   with focal tenderness. XR CHEST 1 VIEW   Final Result   No acute process. There are multiple old left-sided rib fractures. /61   Pulse 77   Temp 97.5 °F (36.4 °C) (Oral)   Resp 17   Ht 5' 11\" (1.803 m)   Wt 192 lb (87.1 kg)   SpO2 90%   BMI 26.78 kg/m²     LABS:    Recent Labs     09/17/22  0734 09/18/22  0900   WBC 13.5* 11.3   HGB 9.5* 9.6*   HCT 28.4* 28.7*    356          Recent Labs     09/16/22  0650 09/16/22  1733 09/18/22  0900   *   < > 129*   K 3.4*   < > 3.9   CL 96*   < > 100   CO2 20*   < > 19*   PHOS 1.8*  --   --    BUN 18   < > 16   CREATININE 0.7   < > 0.7    < > = values in this interval not displayed. Recent Labs     09/16/22  0650 09/17/22  0734   PROT 6.2* 6.2*           ASSESSMENT:  - S/P LLE I&D,Debridement (dos:9/14/22)  -Full-thickness ulcer down to level of exposed bone with exposed tendon left posterior Heel, POA, infected  -Necrotizing infection posterior Achilles. -Uncontrolled insulin dependent type 2 diabetes mellitus with peripheral neuropathy  -Acute on chronic osteomyelitis left posterior heel, POA  -Positive bacteremia Gram-positive cocci in chains  -Peripheral arterial disease s/p arteriogram with angioplasty      PLAN:  - Examined and evaluated  - All labs, imaging, and charts reviewed  - WBC: 11.3  - Pain Control: IV and PO  - Antibiotics as per ID: Rocephin/Flagyl, PICC pending  -Arterial studies 9/16/2022: MAHOGANY 1.0 right, 1.4 left.   Mild symmetrical bilateral arterial stenosis diminutive flow bilateral digits  - Venous US:Pending  -Wound cultures 9/13/2022: MSSA, group A strep, mixed GPO, mixed GNR  -Surgical path 9/14/2022: Pending  -X-ray left foot 9/13/22: Positive osteomyelitis posterior calcaneal tuber with marked soft tissue swelling dorsal aspect of mid and distal forefoot. Questionable hairline fracture proximal phalanx of digit.  -Betadine packing DSD left foot daily changes   - Prevalon boots bilateral  - Planned procedure: Left foot partial calcanectomy with debridement to be performed by Dr. Logan Walker on Monday, 9/19/2022 to follow prior cases. -NPO midnight Sunday evening    D/W: Zeinab Arellano DPM FACFAS  Fellowship-Trained Foot and Ankle Surgeon  Diplomate, [employer] Board [employer] Foot [employer] Ankle Sur[employer]  [phone]     Thank you for involving podiatry in this patients care. Please do not hesitate to call with any questions or concerns.       Johnathon Bautista Podiatry PGY3  9/18/2022   11:13 AM

## 2022-09-18 NOTE — PLAN OF CARE
Problem: Skin/Tissue Integrity  Goal: Absence of new skin breakdown  Description: 1. Monitor for areas of redness and/or skin breakdown  2. Assess vascular access sites hourly  3. Every 4-6 hours minimum:  Change oxygen saturation probe site  4. Every 4-6 hours:  If on nasal continuous positive airway pressure, respiratory therapy assess nares and determine need for appliance change or resting period.   Outcome: Progressing     Problem: Safety - Adult  Goal: Free from fall injury  Outcome: Progressing     Problem: ABCDS Injury Assessment  Goal: Absence of physical injury  Outcome: Progressing     Problem: Chronic Conditions and Co-morbidities  Goal: Patient's chronic conditions and co-morbidity symptoms are monitored and maintained or improved  Outcome: Progressing     Problem: Discharge Planning  Goal: Discharge to home or other facility with appropriate resources  Outcome: Progressing

## 2022-09-18 NOTE — PROGRESS NOTES
[address] [place], [place] [address]ist   Progress Note    Admitting Date and Time: 9/13/2022 12:00 PM  Admit Dx: Lactic acidosis [E87.2]  Septicemia ([address] [A41.9]  Wound infection [T14. 8XXA, L08.9]  Wound cellulitis [L03.90]  Sepsis ([address] [A41.9]    Subjective:    Pt  sitting up in bed in no acute distress. States feels well today. For procedure in am. ? PICC line placement. Denies any new concerns at this time. Per RN: PICC line placement today/tomorrow? For Surgical intervention in am.     ROS: denies fever, chills, cp, sob, n/v, HA unless stated above.      cefTRIAXone (ROCEPHIN) IV  2,000 mg IntraVENous Q24H    metroNIDAZOLE  500 mg Oral 3 times per day    lidocaine 1 % injection  5 mL IntraDERmal Once    sodium chloride flush  10 mL IntraVENous 2 times per day    insulin glargine  5 Units SubCUTAneous Nightly    gabapentin  300 mg Oral BID    [Held by provider] clopidogrel  75 mg Oral Daily    aspirin  81 mg Oral Daily    atorvastatin  40 mg Oral Nightly    cyclobenzaprine  10 mg Oral Nightly    insulin lispro  0-4 Units SubCUTAneous Q4H    enoxaparin  40 mg SubCUTAneous Daily     sodium chloride flush, 10 mL, PRN  sodium chloride, 25 mL, PRN  povidone-iodine, , PRN  calcium carbonate, 500 mg, TID PRN  melatonin, 9 mg, Nightly PRN  diphenhydrAMINE, 25 mg, Q6H PRN  glucose, 4 tablet, PRN  dextrose bolus, 125 mL, PRN   Or  dextrose bolus, 250 mL, PRN  glucagon (rDNA), 1 mg, PRN  dextrose, , Continuous PRN  sodium chloride, , PRN  promethazine, 12.5 mg, Q6H PRN   Or  ondansetron, 4 mg, Q6H PRN  polyethylene glycol, 17 g, Daily PRN  acetaminophen, 650 mg, Q6H PRN   Or  acetaminophen, 650 mg, Q6H PRN       Objective:    /61   Pulse 77   Temp 97.5 °F (36.4 °C) (Oral)   Resp 17   Ht 5' 11\" (1.803 m)   Wt 192 lb (87.1 kg)   SpO2 90%   BMI 26.78 kg/m²   General Appearance: alert and oriented to person, place and time and in no acute distress  Skin: warm and dry  Head: normocephalic and atraumatic  Eyes: There are multiple old left-sided rib fractures. Assessment:  Principal Problem:    Sepsis due to group A beta-hemolytic Streptococcus ([facility]  Active Problems:    Diabetes mellitus ([facility]    Osteomyelitis of left lower extremity (HCC)    Alcohol abuse    Coronary artery disease involving native coronary artery of native heart without angina pectoris    Hyponatremia    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 2 diabetes mellitus, with necrosis of bone ([facility]  Resolved Problems:    * No resolved hospital problems. *      Plan:  Sepsis- 2/2 left calcaneal Osteomyelitis/Strep Pyogenes Bacteremia. POA(Leukocytosis/Tachycardia/Elevated Lactic Acid). 9/14 S/P I&D Left Calcaneus/Posterior leg. Received 3L NSS bolus/Cefepime/Bactrim/Vancomycin  in ED Course. Wound Cultures Staphylococcus Aureus. BC Streptococcus Species. Repeat BC no growth thus far. Vancomycin/cefepime DC Ceftriaxone/Flagyl Day 2  ID/Podiatry input appreciated. Left Calcaneal Osteomyelitis- 9/14 S/P I&D Left Calcaneus/Posterior leg. PICC line placement. Vascular Studies suggest mild symmetric BLE arteriole stenosis. Vancomycin/Cefepime DC. Abx per ID. Betadine soaked kerlix, DSD. Daily dressing changes per Podiatry. Prevalon boots. NPO after midnight. For Left foot partial calcanectomy with debridement in am with Dr. Josey De Jesus. ID/Podiatry input appreciated  Lactic Acidosis- Resolved. 2/2 above. IVF Hydration Lactic Acid 4.3>4.1>1.9. Leukocytosis- Resolved. 2/2 Above. Down trending. WBC 33.6>25.9>19.2>18.8>13.5>11.3. Wound Cultures Staphylococcus Aureus. BC Streptococcus Species. Repeat BC no growth thus far. Vancomycin/cefepime DC Ceftriaxone/Flagyl Day 2. Will likely require PICC placement prior to DC. ID input appreciated. Hyponatremia- 2/2 Hypovolemia/in the setting of sepsis. Drinks 6-8 Beers daily. Na+ 129>130>129. serum osmolality 267. urine Sodium 68. IVF Hydration DC.  Follow  Dm II- A1C in am. Continue SSI/Carb Control Diet/Hypoglycemic protocol. Follow adjust as needed. HTN- HCTZ held. Follow adjust as needed. HLD-Continue Atorvastatin. CAD- Continue Plavix/Statin therapy. Hx CVA(2015/2017)  Disposition- LTACH/HHC at [place]. Pt would prefer to DC home, however is open to [facility] placement. Will likely require PICC line for long term Abx treatment. Repeat surgical intervention/partial calcanectomy tomorrow. Await ID/Podiatry plan. Case Management following. NOTE: This report was transcribed using voice recognition software. Every effort was made to ensure accuracy; however, inadvertent computerized transcription errors may be present. Electronically signed by Sandford Severe Eilleen Blinks - CNP on 9/18/2022 at 8:23 AM

## 2022-09-18 NOTE — PROGRESS NOTES
NAME: Ema Primrose  MR:  [id number]  :   1960  DATE OF SERVICE:22    This is a face to face encounter with Ema Primrose 60 y.o. male on 22  Elements of this note, including Diagnosis,  Interval History, Past Medical/Surgical/Family/Social Histories, ROS, physical exam, and Assessment and Plan were copied and pasted from Previous. Updates have been made where noted and reflect current exam and medical decision making from the DOS of this encounter. CHIEF COMPLAINT     ID following for   Chief Complaint   Patient presents with    Fatigue     Onset several days-hx left foot diabetic wound     HISTORY OF PRESENT ILLNESS     Pt seen and examined  22  In bed has no c/o RA     No fevers WBC11.3 CR0.6    Patient is tolerating medications. No reported adverse drug reactions. REVIEW OF SYSTEMS     As stated above in the chief complaint, otherwise negative.   CURRENT MEDICATIONS     Current Facility-Administered Medications:     cefTRIAXone (ROCEPHIN) 2,000 mg in sterile water 20 mL IV syringe, 2,000 mg, IntraVENous, Q24H, Veena Burrell MD, 2,000 mg at 22 1530    metroNIDAZOLE (FLAGYL) tablet 500 mg, 500 mg, Oral, 3 times per day, Veena Burrell MD, 500 mg at 22 0513    lidocaine 1 % injection 5 mL, 5 mL, IntraDERmal, Once, Veena Burrell MD    sodium chloride flush 0.9 % injection 10 mL, 10 mL, IntraVENous, 2 times per day, Veena Burrell MD, 10 mL at 22 204    sodium chloride flush 0.9 % injection 10 mL, 10 mL, IntraVENous, PRN, Veena Burrell MD    0.9 % sodium chloride infusion, 25 mL, IntraVENous, PRN, Veena Burrell MD    insulin glargine (LANTUS) injection vial 5 Units, 5 Units, SubCUTAneous, Nightly, Lamont Wells DO, 5 Units at 22 204    povidone-iodine (BETADINE) 10 % external solution, , Topical, PRN, Heron Anthony MD    calcium carbonate (TUMS) chewable tablet 500 mg, 500 mg, Oral, TID PRN, Lamont Wells DO, 500 mg at 09/15/22 1245 gabapentin (NEURONTIN) capsule 300 mg, 300 mg, Oral, BID, Yeison Ahammad, DPM, 300 mg at 09/17/22 2041    [Held by provider] clopidogrel (PLAVIX) tablet 75 mg, 75 mg, Oral, Daily, Yeison Ahammad, DPM, 75 mg at 09/17/22 0848    aspirin EC tablet 81 mg, 81 mg, Oral, Daily, Yeison Ahammad, DPM, 81 mg at 09/17/22 0848    melatonin tablet 9 mg, 9 mg, Oral, Nightly PRN, Yeison Ahammad, DPM, 9 mg at 09/17/22 2239    diphenhydrAMINE (BENADRYL) tablet 25 mg, 25 mg, Oral, Q6H PRN, Yeison Ahammad, DPM, 25 mg at 09/17/22 2239    atorvastatin (LIPITOR) tablet 40 mg, 40 mg, Oral, Nightly, Yeison Ahammad, DPM, 40 mg at 09/17/22 2041    cyclobenzaprine (FLEXERIL) tablet 10 mg, 10 mg, Oral, Nightly, Yeison Ahammad, DPM, 10 mg at 09/17/22 2041    glucose chewable tablet 16 g, 4 tablet, Oral, PRN, Yeison Ahammad, DPM    dextrose bolus 10% 125 mL, 125 mL, IntraVENous, PRN **OR** dextrose bolus 10% 250 mL, 250 mL, IntraVENous, PRN, Yeison Ahammad, DPM    glucagon (rDNA) injection 1 mg, 1 mg, SubCUTAneous, PRN, Yeison Ahammad, DPM    dextrose 10 % infusion, , IntraVENous, Continuous PRN, Yeison Ahammad, DPM    insulin lispro (HUMALOG) injection vial 0-4 Units, 0-4 Units, SubCUTAneous, Q4H, Yeison Ahammad, DPM    0.9 % sodium chloride infusion, , IntraVENous, PRN, Yeison Ahammad, DPM    enoxaparin (LOVENOX) injection 40 mg, 40 mg, SubCUTAneous, Daily, Yeison Ahammad, DPM, 40 mg at 09/17/22 0848    promethazine (PHENERGAN) tablet 12.5 mg, 12.5 mg, Oral, Q6H PRN **OR** ondansetron (ZOFRAN) injection 4 mg, 4 mg, IntraVENous, Q6H PRN, Yeison Ahammad, DPM, 4 mg at 09/15/22 1652    polyethylene glycol (GLYCOLAX) packet 17 g, 17 g, Oral, Daily PRN, Yeison Ahammad, DPM    acetaminophen (TYLENOL) tablet 650 mg, 650 mg, Oral, Q6H PRN **OR** acetaminophen (TYLENOL) suppository 650 mg, 650 mg, Rectal, Q6H PRN, Yeison Ahammad, DPM  PHYSICAL EXAM     /61   Pulse 77   Temp 97.5 °F (36.4 °C) (Oral)   Resp 17   Ht 5' 11\" (1.803 m)   Wt 192 lb (87.1 kg)   SpO2 90%   BMI 26.78 kg/m²   Temp  Av.2 °F (36.8 °C)  Min: 97.5 °F (36.4 °C)  Max: 98.8 °F (37.1 °C)  Constitutional:  The patient is awake, alert   Skin:    Warm and dry. No rash   HEENT:     AT/NC  Chest:   No use of accessory muscles to breathe. Symmetrical expansion. Cardiovascular:  S1 and S2 are rhythmic and regular. Abdomen:    Benign to palpation. DISTENDED  Extremities:   No edema. LEFT LE ACED          CNS    AAxO   Lines:    Urinary Catheter 22 Cole (Active)   Site Assessment Swelling 09/15/22 0811   Urine Color Yellow 09/15/22 0811   Urine Appearance Clear 09/15/22 08   Collection Container Standard 09/15/22 08   Catheter Best Practices  Drainage tube clipped to bed;Catheter secured to thigh; Bag below bladder;Bag not on floor; Lack of dependent loop in tubing;Drainage bag less than half full 09/15/22 0811   Status Draining 09/15/22 0811   Output (mL) 550 mL 09/15/22 0650        Peripheral Intravenous Line:  Peripheral IV 09/15/22 Proximal;Right Forearm (Active)   Site Assessment Clean, dry & intact 09/15/22 08   Line Status Infusing 09/15/22 08   Line Care Connections checked and tightened 09/15/22 0015   Phlebitis Assessment No symptoms 09/15/22 08   Infiltration Assessment 0 09/15/22 08   Dressing Status Clean, dry & intact 09/15/22 08   Dressing Type Transparent 09/15/22 08        Drain(s):  Urinary Catheter 22 Cole (Active)   Site Assessment Swelling 09/15/22 0811   Urine Color Yellow 09/15/22 0811   Urine Appearance Clear 09/15/22 0811   Collection Container Standard 09/15/22 0811   Catheter Best Practices  Drainage tube clipped to bed;Catheter secured to thigh; Bag below bladder;Bag not on floor; Lack of dependent loop in tubing;Drainage bag less than half full 09/15/22 0811   Status Draining 09/15/22 0811   Output (mL) 550 mL 09/15/22 0650           DIAGNOSTIC RESULTS   Radiology:    Recent Labs     22  0650 22  0734   WBC 18.8* 13.5* RBC 3.19* 3.32*   HGB 9.2* 9.5*   HCT 27.5* 28.4*   MCV 86.2 85.5   MCH 28.8 28.6   MCHC 33.5 33.5   RDW 13.7 14.0    301   MPV 8.7 8.9       Recent Labs     09/16/22  0650 09/16/22  1733 09/17/22  0734   * 129* 130*   K 3.4* 4.0 3.8   CL 96* 97* 99   CO2 20* 19* 20*   BUN 18 18 17   CREATININE 0.7 0.7 0.6*   GLUCOSE 132* 169* 143*   PROT 6.2*  --  6.2*   LABALBU 2.3*  --  2.1*   CALCIUM 8.0* 8.3* 8.1*   BILITOT 0.8  --  0.7   ALKPHOS 94  --  100   AST 36  --  39   ALT 23  --  26       Lab Results   Component Value Date    CRP 24.9 (H) 09/13/2022    CRP 2.3 (H) 02/14/2021     Lab Results   Component Value Date    SEDRATE 90 (H) 09/13/2022    SEDRATE 87 (H) 02/14/2021     Recent Labs     09/16/22  0650 09/17/22  0734   AST 36 39   ALT 23 26       Lab Results   Component Value Date/Time    CHOL 164 03/01/2016 08:10 AM    TRIG 170 03/01/2016 08:10 AM    HDL 43 03/01/2016 08:10 AM    LDLCALC 87 03/01/2016 08:10 AM    LABVLDL 34 03/01/2016 08:10 AM     Lab Results   Component Value Date/Time    VITD25 12 (L) 12/28/2020 09:15 AM       Microbiology:   No results for input(s): COVID19 in the last 72 hours.   Lab Results   Component Value Date/Time    BC 24 Hours no growth 09/15/2022 07:24 AM    BC 5 Days no growth 02/16/2021 04:37 PM    BC Previously positive blood culture called 02/14/2021 07:07 PM    [facility]  02/14/2021 07:07 PM     Refer to previous culture for susceptibility results  of CXBL2 (LAC) collected 02/14/2021 at 19:07      BLOODCULT2 24 Hours no growth 09/15/2022 07:25 AM    BLOODCULT2 Previously positive blood culture called 09/13/2022 02:59 PM    BLOODCULT2  09/13/2022 02:59 PM     Refer to previous culture CXBL 9/13/2022 1459 for susceptibility results    ORG Anaerobic gram negative justo 09/14/2022 04:41 PM    ORG Anaerobic gram positive cocci 09/14/2022 04:41 PM    ORG BHS Group A (Strep pyogenes) 09/13/2022 02:59 PM    ORG BHS Group A (Strep pyogenes) 09/13/2022 02:59 PM    ORG Staphylococcus aureus 09/13/2022 02:59 PM    ORG Strep pyogenes (Beta Strep Group A) 09/13/2022 02:59 PM     WOUND/ABSCESS   Date Value Ref Range Status   09/13/2022 (A)  Final    Mixed laura isolated. Further workup and sensitivity testing  is not routinely indicated and will not be performed. Mixed laura isolated includes:  Mixed Gram negative rods  Gram positive rods catalase negative     09/13/2022   Final    Heavy growth  This isolate is presumed to be resistant based on the  detection of inducible Clindamycin resistance. Clindamycin  may still be effective in some patients. 09/13/2022 Heavy growth  Final     Smear, Respiratory   Date Value Ref Range Status   01/01/2021   Final    Group 6: <25 PMN's/LPF and <25 Epithelial cells/LPF  Rare Polymorphonuclear leukocytes  Epithelial cells not seen  No organisms seen           Component Value Date/Time    AFBCX No growth after 6 weeks of incubation. 02/16/2021 0732    AFBCX No growth after 6 weeks of incubation. 02/16/2021 0730    FUNGSM No Fungal elements seen 09/14/2022 1641    LABFUNG No growth after 4 weeks of incubation. 02/16/2021 0732    LABFUNG No growth after 4 weeks of incubation. 02/16/2021 0730       MRSA Culture Only   Date Value Ref Range Status   12/26/2020 Methicillin resistant Staph aureus not isolated  Final     CULTURE, RESPIRATORY   Date Value Ref Range Status   01/01/2021 Oral Pharyngeal Laura absent  Growth not present    Final     No results for input(s): CXSURG, ORG in the last 72 hours. No results for input(s): WNDABS, ORG in the last 72 hours. FINAL IMPRESSION    Pt had   Chief Complaint   Patient presents with    Fatigue     Onset several days-hx left foot diabetic wound    Admitted for Lactic acidosis [E87.2]  HAS SEPSIS  -Streptococcus species SEPTICEMIA 9/13  -LEUKOCYTOSIS trending down   -LEFT LE CELLULITIS  -LEFT HEEL OM     PROCEDURES:  1. Incision and drainage, left calcaneus bone. 2.  Incision and drainage posterior left leg. cefTRIAXone (ROCEPHIN) 2,000 mg in sterile water 20 mL IV syringe, Q24H  metroNIDAZOLE (FLAGYL) tablet 500 mg, 3 times per day       picc    To follow ordered labs, cultures and imaging. Imaging and labs were reviewed per medical records. The patient was educated about the diagnosis, prognosis, indications, risks and benefits of treatment. An opportunity to ask questions was given to the patient/FAMILY. Thank you for involving me in the care of Azael Caruso I will continue to follow. Please do not hesitate to call for any questions or concerns.     Electronically signed by Mallorie Serrato MD on 9/18/2022 at 9:08 AM

## 2022-09-19 ENCOUNTER — ANESTHESIA (OUTPATIENT)
Dept: OPERATING ROOM | Age: 62
DRG: 854 | End: 2022-09-19
Payer: COMMERCIAL

## 2022-09-19 ENCOUNTER — ANESTHESIA EVENT (OUTPATIENT)
Dept: OPERATING ROOM | Age: 62
DRG: 854 | End: 2022-09-19
Payer: COMMERCIAL

## 2022-09-19 ENCOUNTER — HOSPITAL ENCOUNTER (OUTPATIENT)
Dept: WOUND CARE | Age: 62
Discharge: HOME | End: 2022-09-19

## 2022-09-19 LAB
ALBUMIN SERPL-MCNC: 2.3 G/DL (ref 3.5–5.2)
ALP BLD-CCNC: 95 U/L (ref 40–129)
ALT SERPL-CCNC: 19 U/L (ref 0–40)
ANION GAP SERPL CALCULATED.3IONS-SCNC: 10 MMOL/L (ref 7–16)
AST SERPL-CCNC: 25 U/L (ref 0–39)
BASOPHILS ABSOLUTE: 0.25 E9/L (ref 0–0.2)
BASOPHILS RELATIVE PERCENT: 2 % (ref 0–2)
BILIRUB SERPL-MCNC: 0.5 MG/DL (ref 0–1.2)
BUN BLDV-MCNC: 15 MG/DL (ref 6–23)
CALCIUM SERPL-MCNC: 8.6 MG/DL (ref 8.6–10.2)
CHLORIDE BLD-SCNC: 100 MMOL/L (ref 98–107)
CO2: 18 MMOL/L (ref 22–29)
CREAT SERPL-MCNC: 0.6 MG/DL (ref 0.7–1.2)
CULTURE SURGICAL: NORMAL
EOSINOPHILS ABSOLUTE: 0.5 E9/L (ref 0.05–0.5)
EOSINOPHILS RELATIVE PERCENT: 4 % (ref 0–6)
GFR AFRICAN AMERICAN: >60
GFR NON-AFRICAN AMERICAN: >60 ML/MIN/1.73
GLUCOSE BLD-MCNC: 130 MG/DL (ref 74–99)
HCT VFR BLD CALC: 29.1 % (ref 37–54)
HEMOGLOBIN: 9.8 G/DL (ref 12.5–16.5)
LYMPHOCYTES ABSOLUTE: 1.76 E9/L (ref 1.5–4)
LYMPHOCYTES RELATIVE PERCENT: 14 % (ref 20–42)
MAGNESIUM: 1.9 MG/DL (ref 1.6–2.6)
MCH RBC QN AUTO: 28.7 PG (ref 26–35)
MCHC RBC AUTO-ENTMCNC: 33.7 % (ref 32–34.5)
MCV RBC AUTO: 85.3 FL (ref 80–99.9)
METAMYELOCYTES RELATIVE PERCENT: 3 % (ref 0–1)
METER GLUCOSE: 123 MG/DL (ref 74–99)
METER GLUCOSE: 123 MG/DL (ref 74–99)
METER GLUCOSE: 150 MG/DL (ref 74–99)
METER GLUCOSE: 157 MG/DL (ref 74–99)
METER GLUCOSE: 178 MG/DL (ref 74–99)
MONOCYTES ABSOLUTE: 0.63 E9/L (ref 0.1–0.95)
MONOCYTES RELATIVE PERCENT: 5 % (ref 2–12)
MYELOCYTE PERCENT: 1 % (ref 0–0)
NEUTROPHILS ABSOLUTE: 9.45 E9/L (ref 1.8–7.3)
NEUTROPHILS RELATIVE PERCENT: 71 % (ref 43–80)
PDW BLD-RTO: 14.1 FL (ref 11.5–15)
PLATELET # BLD: 413 E9/L (ref 130–450)
PMV BLD AUTO: 9.1 FL (ref 7–12)
POTASSIUM SERPL-SCNC: 3.9 MMOL/L (ref 3.5–5)
RBC # BLD: 3.41 E12/L (ref 3.8–5.8)
RBC # BLD: NORMAL 10*6/UL
SARS-COV-2, NAAT: NOT DETECTED
SODIUM BLD-SCNC: 128 MMOL/L (ref 132–146)
TOTAL PROTEIN: 6.6 G/DL (ref 6.4–8.3)
WBC # BLD: 12.6 E9/L (ref 4.5–11.5)

## 2022-09-19 PROCEDURE — 2500000003 HC RX 250 WO HCPCS: Performed by: SPECIALIST

## 2022-09-19 PROCEDURE — 6360000002 HC RX W HCPCS: Performed by: INTERNAL MEDICINE

## 2022-09-19 PROCEDURE — 6370000000 HC RX 637 (ALT 250 FOR IP): Performed by: STUDENT IN AN ORGANIZED HEALTH CARE EDUCATION/TRAINING PROGRAM

## 2022-09-19 PROCEDURE — 36415 COLL VENOUS BLD VENIPUNCTURE: CPT

## 2022-09-19 PROCEDURE — 2720000010 HC SURG SUPPLY STERILE

## 2022-09-19 PROCEDURE — 76937 US GUIDE VASCULAR ACCESS: CPT

## 2022-09-19 PROCEDURE — 6370000000 HC RX 637 (ALT 250 FOR IP): Performed by: SPECIALIST

## 2022-09-19 PROCEDURE — 36569 INSJ PICC 5 YR+ W/O IMAGING: CPT

## 2022-09-19 PROCEDURE — 02HV33Z INSERTION OF INFUSION DEVICE INTO SUPERIOR VENA CAVA, PERCUTANEOUS APPROACH: ICD-10-PCS | Performed by: INTERNAL MEDICINE

## 2022-09-19 PROCEDURE — 80053 COMPREHEN METABOLIC PANEL: CPT

## 2022-09-19 PROCEDURE — 99232 SBSQ HOSP IP/OBS MODERATE 35: CPT | Performed by: INTERNAL MEDICINE

## 2022-09-19 PROCEDURE — 83735 ASSAY OF MAGNESIUM: CPT

## 2022-09-19 PROCEDURE — 6360000002 HC RX W HCPCS: Performed by: SPECIALIST

## 2022-09-19 PROCEDURE — 87635 SARS-COV-2 COVID-19 AMP PRB: CPT

## 2022-09-19 PROCEDURE — 1200000000 HC SEMI PRIVATE

## 2022-09-19 PROCEDURE — 2580000003 HC RX 258: Performed by: SPECIALIST

## 2022-09-19 PROCEDURE — 85025 COMPLETE CBC W/AUTO DIFF WBC: CPT

## 2022-09-19 PROCEDURE — 82962 GLUCOSE BLOOD TEST: CPT

## 2022-09-19 PROCEDURE — C1751 CATH, INF, PER/CENT/MIDLINE: HCPCS

## 2022-09-19 RX ORDER — HEPARIN SODIUM (PORCINE) LOCK FLUSH IV SOLN 100 UNIT/ML 100 UNIT/ML
100 SOLUTION INTRAVENOUS 2 TIMES DAILY
Status: DISCONTINUED | OUTPATIENT
Start: 2022-09-19 | End: 2022-09-26 | Stop reason: HOSPADM

## 2022-09-19 RX ADMIN — METRONIDAZOLE 500 MG: 500 TABLET ORAL at 20:09

## 2022-09-19 RX ADMIN — Medication 10 ML: at 10:15

## 2022-09-19 RX ADMIN — Medication 10 ML: at 20:19

## 2022-09-19 RX ADMIN — Medication 100 UNITS: at 20:08

## 2022-09-19 RX ADMIN — METRONIDAZOLE 500 MG: 500 TABLET ORAL at 15:04

## 2022-09-19 RX ADMIN — LIDOCAINE HYDROCHLORIDE 5 ML: 10 SOLUTION INTRAVENOUS at 09:23

## 2022-09-19 RX ADMIN — WATER 2000 MG: 1 INJECTION INTRAMUSCULAR; INTRAVENOUS; SUBCUTANEOUS at 15:04

## 2022-09-19 RX ADMIN — METRONIDAZOLE 500 MG: 500 TABLET ORAL at 05:17

## 2022-09-19 RX ADMIN — GABAPENTIN 300 MG: 300 CAPSULE ORAL at 20:09

## 2022-09-19 RX ADMIN — CYCLOBENZAPRINE HYDROCHLORIDE 10 MG: 5 TABLET, FILM COATED ORAL at 20:09

## 2022-09-19 RX ADMIN — GABAPENTIN 300 MG: 300 CAPSULE ORAL at 10:15

## 2022-09-19 RX ADMIN — ATORVASTATIN CALCIUM 40 MG: 40 TABLET, FILM COATED ORAL at 20:10

## 2022-09-19 ASSESSMENT — PAIN SCALES - GENERAL: PAINLEVEL_OUTOF10: 3

## 2022-09-19 ASSESSMENT — PAIN DESCRIPTION - LOCATION: LOCATION: FOOT

## 2022-09-19 ASSESSMENT — PAIN DESCRIPTION - DESCRIPTORS: DESCRIPTORS: ACHING;DISCOMFORT;DULL

## 2022-09-19 ASSESSMENT — PAIN DESCRIPTION - ORIENTATION: ORIENTATION: LEFT

## 2022-09-19 NOTE — PROGRESS NOTES
NAME: Benji Taylor  MR:  [id number]  :   1960  DATE OF SERVICE:22    This is a face to face encounter with Benji Taylor 60 y.o. male on 22  Elements of this note, including Diagnosis,  Interval History, Past Medical/Surgical/Family/Social Histories, ROS, physical exam, and Assessment and Plan were copied and pasted from Previous. Updates have been made where noted and reflect current exam and medical decision making from the DOS of this encounter. CHIEF COMPLAINT     ID following for   Chief Complaint   Patient presents with    Fatigue     Onset several days-hx left foot diabetic wound     HISTORY OF PRESENT ILLNESS     Pt seen and examined  22  In bed has no c/o RA  Surgery cancelled  Afebrile on RA  wbc12.6 cr0.6    Patient is tolerating medications. No reported adverse drug reactions. REVIEW OF SYSTEMS     As stated above in the chief complaint, otherwise negative.   CURRENT MEDICATIONS     Current Facility-Administered Medications:     cefTRIAXone (ROCEPHIN) 2,000 mg in sterile water 20 mL IV syringe, 2,000 mg, IntraVENous, Q24H, Burak Vargas MD, 2,000 mg at 22 1504    metroNIDAZOLE (FLAGYL) tablet 500 mg, 500 mg, Oral, 3 times per day, Burak Vargas MD, 500 mg at 22 1504    sodium chloride flush 0.9 % injection 10 mL, 10 mL, IntraVENous, 2 times per day, Burak Vargas MD, 10 mL at 22 1015    sodium chloride flush 0.9 % injection 10 mL, 10 mL, IntraVENous, PRN, Burak Vargas MD    0.9 % sodium chloride infusion, 25 mL, IntraVENous, PRN, Burak Vargas MD    insulin glargine (LANTUS) injection vial 5 Units, 5 Units, SubCUTAneous, Nightly, Lamont Wells DO, 5 Units at 22    povidone-iodine (BETADINE) 10 % external solution, , Topical, PRN, Inés Mccall MD    calcium carbonate (TUMS) chewable tablet 500 mg, 500 mg, Oral, TID PRN, [facility] DO Priscilla, 500 mg at 22 315    gabapentin (NEURONTIN) capsule 300 mg, 300 mg, Oral, BID, 98.6 °F (37 °C)  Min: 98.3 °F (36.8 °C)  Max: 98.8 °F (37.1 °C)  Constitutional:  The patient is awake, alert  nad   Skin:    Warm and dry. No rash   HEENT:     AT/NC  Chest:   No use of accessory muscles to breathe. Symmetrical expansion. On RA  Cardiovascular:  S1 and S2 are rhythmic and regular. Abdomen:    Benign to palpation. DISTENDED  Extremities:   No edema. LEFT LE ACED        CNS    AAxO         Peripherally Inserted Central Catheter:  PICC Single Lumen 09/19/22 Right Brachial (Active)   Central Line Being Utilized Yes 09/19/22 0932   Criteria for Appropriate Use Long term IV/antibiotic administration 09/19/22 0932   Site Assessment Clean;Dry; Intact 09/19/22 1036   Phlebitis Assessment No symptoms 09/19/22 0932   Infiltration Assessment 0 09/19/22 0932   Lumen Color/Status Pink;Flushed 09/19/22 1036   Extremity Circumference (cm) 32 cm 09/19/22 0932   Alcohol Cap Used Yes 09/19/22 0932   Date of Last Dressing Change 09/19/22 09/19/22 0932   Dressing Type Transparent; Antimicrobial;Securing device 09/19/22 1036   Dressing Status Clean, dry & intact 09/19/22 1036        DIAGNOSTIC RESULTS   Radiology:    Recent Labs     09/17/22  0734 09/18/22  0900 09/19/22  0650   WBC 13.5* 11.3 12.6*   RBC 3.32* 3.34* 3.41*   HGB 9.5* 9.6* 9.8*   HCT 28.4* 28.7* 29.1*   MCV 85.5 85.9 85.3   MCH 28.6 28.7 28.7   MCHC 33.5 33.4 33.7   RDW 14.0 14.1 14.1    356 413   MPV 8.9 9.3 9.1       Recent Labs     09/17/22  0734 09/18/22  0900 09/19/22  0650   * 129* 128*   K 3.8 3.9 3.9   CL 99 100 100   CO2 20* 19* 18*   BUN 17 16 15   CREATININE 0.6* 0.7 0.6*   GLUCOSE 143* 161* 130*   PROT 6.2*  --  6.6   LABALBU 2.1*  --  2.3*   CALCIUM 8.1* 8.5* 8.6   BILITOT 0.7  --  0.5   ALKPHOS 100  --  95   AST 39  --  25   ALT 26  --  19       Lab Results   Component Value Date    CRP 24.9 (H) 09/13/2022    CRP 2.3 (H) 02/14/2021     Lab Results   Component Value Date    SEDRATE 90 (H) 09/13/2022    SEDRATE 87 (H) 02/14/2021     Recent Labs     09/17/22  0734 09/19/22  0650   AST 39 25   ALT 26 19       Lab Results   Component Value Date/Time    CHOL 164 03/01/2016 08:10 AM    TRIG 170 03/01/2016 08:10 AM    HDL 43 03/01/2016 08:10 AM    LDLCALC 87 03/01/2016 08:10 AM    LABVLDL 34 03/01/2016 08:10 AM     Lab Results   Component Value Date/Time    VITD25 12 (L) 12/28/2020 09:15 AM       Microbiology:   Recent Labs     09/19/22  1315   COVID19 Not Detected     Lab Results   Component Value Date/Time    BC 24 Hours no growth 09/15/2022 07:24 AM    BC 5 Days no growth 02/16/2021 04:37 PM    BC Previously positive blood culture called 02/14/2021 07:07 PM    BC  02/14/2021 07:07 PM     Refer to previous culture for susceptibility results  of CXBL2 (LAC) collected 02/14/2021 at 19:07      BLOODCULT2 24 Hours no growth 09/15/2022 07:25 AM    BLOODCULT2 Previously positive blood culture called 09/13/2022 02:59 PM    BLOODCULT2  09/13/2022 02:59 PM     Refer to previous culture CXBL 9/13/2022 1459 for susceptibility results    ORG Anaerobic gram negative justo 09/14/2022 04:41 PM    ORG Anaerobic gram positive cocci 09/14/2022 04:41 PM    ORG BHS Group A (Strep pyogenes) 09/13/2022 02:59 PM    ORG BHS Group A (Strep pyogenes) 09/13/2022 02:59 PM    ORG Staphylococcus aureus 09/13/2022 02:59 PM    ORG Strep pyogenes (Beta Strep Group A) 09/13/2022 02:59 PM     WOUND/ABSCESS   Date Value Ref Range Status   09/13/2022 (A)  Final    Mixed laura isolated. Further workup and sensitivity testing  is not routinely indicated and will not be performed. Mixed laura isolated includes:  Mixed Gram negative rods  Gram positive rods catalase negative     09/13/2022   Final    Heavy growth  This isolate is presumed to be resistant based on the  detection of inducible Clindamycin resistance. Clindamycin  may still be effective in some patients.      09/13/2022 Heavy growth  Final     Smear, Respiratory   Date Value Ref Range Status   01/01/2021   Final Group 6: <25 PMN's/LPF and <25 Epithelial cells/LPF  Rare Polymorphonuclear leukocytes  Epithelial cells not seen  No organisms seen           Component Value Date/Time    AFBCX No growth after 6 weeks of incubation. 02/16/2021 0732    AFBCX No growth after 6 weeks of incubation. 02/16/2021 0730    FUNGSM No Fungal elements seen 09/14/2022 1641    LABFUNG No growth after 4 weeks of incubation. 02/16/2021 0732    LABFUNG No growth after 4 weeks of incubation. 02/16/2021 0730       MRSA Culture Only   Date Value Ref Range Status   12/26/2020 Methicillin resistant Staph aureus not isolated  Final     CULTURE, RESPIRATORY   Date Value Ref Range Status   01/01/2021 Oral Pharyngeal Hannah absent  Growth not present    Final        FINAL IMPRESSION    Pt had   Chief Complaint   Patient presents with    Fatigue     Onset several days-hx left foot diabetic wound    Admitted for Lactic acidosis [E87.2]  HAS SEPSIS  -Streptococcus species SEPTICEMIA 9/13  Foot cx MSSA/Strep   -LEUKOCYTOSIS trending down   -LEFT LE CELLULITIS  -LEFT HEEL OM     PROCEDURES:  1. Incision and drainage, left calcaneus bone. 2.  Incision and drainage posterior left leg. cefTRIAXone (ROCEPHIN) 2,000 mg in sterile water 20 mL IV syringe, Q24H  metroNIDAZOLE (FLAGYL) tablet 500 mg, 3 times per day     Awaiting surgery   Picc in  D/c planning     To follow ordered labs, cultures and imaging. Imaging and labs were reviewed per medical records. The patient was educated about the diagnosis, prognosis, indications, risks and benefits of treatment. An opportunity to ask questions was given to the patient/FAMILY. Thank you for involving me in the care of John Nguyen I will continue to follow. Please do not hesitate to call for any questions or concerns.     Electronically signed by Yvrose Harrison MD on 9/19/2022 at 3:54 PM

## 2022-09-19 NOTE — PROCEDURES
SL Power PICC Placement 9/19/2022    Product number: [id number]   Lot Number: [id number]      Ultrasound: yes   R Brachial      Upper Arm Circumference: 32cm    Size: 4.5FR    Exposed Length:     Internal Length: 40cm   Cut: 15cm   Vein Measurement: 0.5cm    Pierce Bales RN  9/19/2022  9:37 AM      Consent signed  Brisk blood return  Flushed well  Tip placed at lower 1/3 of the SVC at CAJ  Pt tolerated well  RN notified

## 2022-09-19 NOTE — PROGRESS NOTES
Due to limited OR availability - surgery for partial calcanectomy cancelled and rescheduled for tomorrow. NPO rescheduled and diet resumed. Will continue to follow. Abx per ID.     Discussed with Dr. Carleen Mckay DPM  12:58 PM

## 2022-09-19 NOTE — CARE COORDINATION
9/19/2022 1109 CM note: Covid test to be sent today-await results. Pt with positive blood cx and acute osteomyelitis left calcaneus. He is for podiatric intervention today and per ID will need IV Rocephin x 6 weeks. PICC line in place. Pt agreeable for SNF and prefers [facility] Referral placed to william Li to review for acceptance. (Per [facility] william Calzada-pt does not have LTACH criteria). For SNF WILL NNED TAINA ESTRADA, signed KELLY , covid testing, PT/OT oliva.  Tim OJEDA

## 2022-09-19 NOTE — PROGRESS NOTES
[address] [place], [place] [address]ist   Progress Note    Admitting Date and Time: 9/13/2022 12:00 PM  Admit Dx: Lactic acidosis [E87.2]  Septicemia ([address] [A41.9]  Wound infection [T14. 8XXA, L08.9]  Wound cellulitis [L03.90]  Sepsis ([address] [A41.9]    Subjective:    9/18: Pt  sitting up in bed in no acute distress. States feels well today. For procedure in am. ? PICC line placement. Denies any new concerns at this time. 9/19: Pt somewhat frustrated that he was NPO most of the day and then procedure got cancelled. Per RN: surgery got postponed due to scheduling issues for OR time. He was placed on schedule for tomorrow.  PICC line placed this morning by IV team.       cefTRIAXone (ROCEPHIN) IV  2,000 mg IntraVENous Q24H    metroNIDAZOLE  500 mg Oral 3 times per day    sodium chloride flush  10 mL IntraVENous 2 times per day    insulin glargine  5 Units SubCUTAneous Nightly    gabapentin  300 mg Oral BID    [Held by provider] clopidogrel  75 mg Oral Daily    aspirin  81 mg Oral Daily    atorvastatin  40 mg Oral Nightly    cyclobenzaprine  10 mg Oral Nightly    insulin lispro  0-4 Units SubCUTAneous Q4H    enoxaparin  40 mg SubCUTAneous Daily     sodium chloride flush, 10 mL, PRN  sodium chloride, 25 mL, PRN  povidone-iodine, , PRN  calcium carbonate, 500 mg, TID PRN  melatonin, 9 mg, Nightly PRN  diphenhydrAMINE, 25 mg, Q6H PRN  glucose, 4 tablet, PRN  dextrose bolus, 125 mL, PRN   Or  dextrose bolus, 250 mL, PRN  glucagon (rDNA), 1 mg, PRN  dextrose, , Continuous PRN  sodium chloride, , PRN  promethazine, 12.5 mg, Q6H PRN   Or  ondansetron, 4 mg, Q6H PRN  polyethylene glycol, 17 g, Daily PRN  acetaminophen, 650 mg, Q6H PRN   Or  acetaminophen, 650 mg, Q6H PRN       Objective:    BP (!) 145/71   Pulse 76   Temp 98.8 °F (37.1 °C) (Oral)   Resp 18   Ht 5' 11\" (1.803 m)   Wt 192 lb (87.1 kg)   SpO2 96%   BMI 26.78 kg/m²   General Appearance: alert and oriented to person, place and time and in no acute distress  Skin: warm and dry  Head: normocephalic and atraumatic  Eyes: pupils equal, round, and reactive to light, extraocular eye movements intact, conjunctivae normal  Neck: neck supple and non tender without mass   Pulmonary/Chest: clear to auscultation bilaterally- no wheezes, rales or rhonchi, normal air movement, no respiratory distress  Cardiovascular: normal rate, normal S1 and S2 and no RGM  Abdomen: soft, non-tender, non-distended, normal bowel sounds  Extremities: no cyanosis, no clubbing and no edema. LLE Surgical dressing CDI. Neurologic: no cranial nerve deficit and speech normal      Recent Labs     09/17/22  0734 09/18/22  0900 09/19/22  0650   * 129* 128*   K 3.8 3.9 3.9   CL 99 100 100   CO2 20* 19* 18*   BUN 17 16 15   CREATININE 0.6* 0.7 0.6*   GLUCOSE 143* 161* 130*   CALCIUM 8.1* 8.5* 8.6         Recent Labs     09/17/22  0734 09/19/22  0650   ALKPHOS 100 95   PROT 6.2* 6.6   LABALBU 2.1* 2.3*   BILITOT 0.7 0.5   AST 39 25   ALT 26 19         Recent Labs     09/17/22  0734 09/18/22  0900 09/19/22  0650   WBC 13.5* 11.3 12.6*   RBC 3.32* 3.34* 3.41*   HGB 9.5* 9.6* 9.8*   HCT 28.4* 28.7* 29.1*   MCV 85.5 85.9 85.3   MCH 28.6 28.7 28.7   MCHC 33.5 33.4 33.7   RDW 14.0 14.1 14.1    356 413   MPV 8.9 9.3 9.1              Radiology:   VL LOWER EXTREMITY ARTERIAL SEGMENTAL PRESSURES W PPG BILATERAL   Final Result   In spite of normal bilateral ankle-brachial indices, Doppler and PVR   waveforms suggest at least mild, symmetric bilateral lower extremity arterial   stenosis. Correlation with CTA of the abdomen/pelvis with lower extremity   runoff could be considered for better assessment. XR FOOT LEFT (2 VIEWS)   Final Result   Marked soft tissue swelling in the dorsal aspect of the mid and distal foot. Slight interval improvement of soft tissue ulceration in the heel and   cortical irregularity along the posterior margin of the calcaneus.       Question hairline fracture, proximal phalanx of 5th toe. Please correlate   with focal tenderness. XR CHEST 1 VIEW   Final Result   No acute process. There are multiple old left-sided rib fractures. Assessment:  Principal Problem:    Sepsis due to group A beta-hemolytic Streptococcus ([address]  Active Problems:    Diabetes mellitus ([address]    Osteomyelitis of left lower extremity (HCC)    Alcohol abuse    Coronary artery disease involving native coronary artery of native heart without angina pectoris    Hyponatremia    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 2 diabetes mellitus, with necrosis of bone ([address]  Resolved Problems:    * No resolved hospital problems. *      Plan:                Sepsis due to cellulitis, left calcaneal osteomyelitis as well as Strep pyogenes bacteremia in patient with left diabetic foot ulcer  -Sepsis evidenced by leukocytosis (33.6), tachycardia (102) and cellulitis of the foot  -Podiatry and ID following  -Status post incision and drainage of left calcaneus and posterior left leg on 9/14. Plan for left partial calcanectomy with debridement on 9/19 for podiatry  -Blood cultures with strep pyogenes, wound cultures with strep pyogenes and staph aureus. Antibiotics narrowed to ceftriaxone 2 g IV daily and metronidazole 500 mg p.o. every 8 hours per ID; day #3. Prior to this completed 4 days of cefepime and 3 days of Vanco.  -Discussed that he will need long term IV antibiotics. Repeat blood cultures show no growth. SL PICC line placed today      2. Lactic acidosis  -Due to the above but resolved with IV fluid hydration  -Lactic acid trend as follows 4.3-->4.1- >1.9     3. Type II diabetes mellitus  -Added Lantus 5 units nightly in addition to corrective scale with improved glycemic control      4. Hypertension/CAD  -Hydrochlorothiazide on hold given hyponatremia  -Hold Plavix in anticipation of surgery 9/20     5.         Hyponatremia  -likely component of hypovolemia due to sepsis, however he has had this in the past due to alcohol abuse. States he has been drinking 6-8 beers a day lately  -Now that he has been volume resuscitated from sepsis standpoint, he fluids discontinued  -Sodium is correcting at an appropriate rate;128 this morning. Repeat chemistries tomorrow morning     6. Alcohol abuse  -6-8 beers per day  -counseled on cessation   -no signs of withdrawal syndrome at this time, but will have low threshold for initiating CIWA protocol      Disposition: Patient is now agreeable to SNF or LTAC. At least several more days in the hospital for surgery on 9/20, postoperative care, and PICC line placement    NOTE: This report was transcribed using voice recognition software. Every effort was made to ensure accuracy; however, inadvertent computerized transcription errors may be present.      Electronically signed by Cristian Cabrera MD on 9/19/2022 at 6:17 PM

## 2022-09-20 PROBLEM — L03.90 SEPSIS DUE TO CELLULITIS (HCC): Status: ACTIVE | Noted: 2022-09-20

## 2022-09-20 PROBLEM — E11.65 UNCONTROLLED TYPE 2 DIABETES MELLITUS WITH HYPERGLYCEMIA (HCC): Status: ACTIVE | Noted: 2022-09-20

## 2022-09-20 PROBLEM — L97.529 TYPE 2 DIABETES MELLITUS WITH LEFT DIABETIC FOOT ULCER (HCC): Status: ACTIVE | Noted: 2022-09-20

## 2022-09-20 PROBLEM — A41.9 SEPSIS DUE TO CELLULITIS (HCC): Status: ACTIVE | Noted: 2022-09-20

## 2022-09-20 PROBLEM — R78.81 BACTEREMIA DUE TO GRAM-POSITIVE BACTERIA: Status: ACTIVE | Noted: 2022-09-20

## 2022-09-20 PROBLEM — E87.20 LACTIC ACIDOSIS: Status: ACTIVE | Noted: 2022-09-20

## 2022-09-20 PROBLEM — E11.621 TYPE 2 DIABETES MELLITUS WITH LEFT DIABETIC FOOT ULCER (HCC): Status: ACTIVE | Noted: 2022-09-20

## 2022-09-20 LAB
ALBUMIN SERPL-MCNC: 2.5 G/DL (ref 3.5–5.2)
ALP BLD-CCNC: 102 U/L (ref 40–129)
ALT SERPL-CCNC: 18 U/L (ref 0–40)
ANION GAP SERPL CALCULATED.3IONS-SCNC: 9 MMOL/L (ref 7–16)
AST SERPL-CCNC: 25 U/L (ref 0–39)
BASOPHILS ABSOLUTE: 0 E9/L (ref 0–0.2)
BASOPHILS RELATIVE PERCENT: 0.7 % (ref 0–2)
BILIRUB SERPL-MCNC: 0.4 MG/DL (ref 0–1.2)
BLOOD CULTURE, ROUTINE: NORMAL
BUN BLDV-MCNC: 15 MG/DL (ref 6–23)
BURR CELLS: ABNORMAL
CALCIUM SERPL-MCNC: 8.2 MG/DL (ref 8.6–10.2)
CHLORIDE BLD-SCNC: 101 MMOL/L (ref 98–107)
CO2: 19 MMOL/L (ref 22–29)
CREAT SERPL-MCNC: 0.7 MG/DL (ref 0.7–1.2)
CULTURE, BLOOD 2: NORMAL
EOSINOPHILS ABSOLUTE: 0.26 E9/L (ref 0.05–0.5)
EOSINOPHILS RELATIVE PERCENT: 1.8 % (ref 0–6)
GFR AFRICAN AMERICAN: >60
GFR NON-AFRICAN AMERICAN: >60 ML/MIN/1.73
GLUCOSE BLD-MCNC: 138 MG/DL (ref 74–99)
HCT VFR BLD CALC: 27.6 % (ref 37–54)
HEMOGLOBIN: 9.5 G/DL (ref 12.5–16.5)
LYMPHOCYTES ABSOLUTE: 1.03 E9/L (ref 1.5–4)
LYMPHOCYTES RELATIVE PERCENT: 7.1 % (ref 20–42)
MCH RBC QN AUTO: 29.1 PG (ref 26–35)
MCHC RBC AUTO-ENTMCNC: 34.4 % (ref 32–34.5)
MCV RBC AUTO: 84.4 FL (ref 80–99.9)
METAMYELOCYTES RELATIVE PERCENT: 1.8 % (ref 0–1)
METER GLUCOSE: 140 MG/DL (ref 74–99)
METER GLUCOSE: 144 MG/DL (ref 74–99)
METER GLUCOSE: 170 MG/DL (ref 74–99)
MONOCYTES ABSOLUTE: 0.59 E9/L (ref 0.1–0.95)
MONOCYTES RELATIVE PERCENT: 3.5 % (ref 2–12)
MYELOCYTE PERCENT: 0.9 % (ref 0–0)
NEUTROPHILS ABSOLUTE: 12.94 E9/L (ref 1.8–7.3)
NEUTROPHILS RELATIVE PERCENT: 85 % (ref 43–80)
NUCLEATED RED BLOOD CELLS: 1.8 /100 WBC
OSMOLALITY URINE: 644 MOSM/KG (ref 300–900)
OVALOCYTES: ABNORMAL
PDW BLD-RTO: 14.2 FL (ref 11.5–15)
PLATELET # BLD: 448 E9/L (ref 130–450)
PMV BLD AUTO: 9.2 FL (ref 7–12)
POIKILOCYTES: ABNORMAL
POLYCHROMASIA: ABNORMAL
POTASSIUM SERPL-SCNC: 3.7 MMOL/L (ref 3.5–5)
RBC # BLD: 3.27 E12/L (ref 3.8–5.8)
SODIUM BLD-SCNC: 129 MMOL/L (ref 132–146)
TOTAL PROTEIN: 6.8 G/DL (ref 6.4–8.3)
WBC # BLD: 14.7 E9/L (ref 4.5–11.5)

## 2022-09-20 PROCEDURE — 2580000003 HC RX 258: Performed by: SPECIALIST

## 2022-09-20 PROCEDURE — 6370000000 HC RX 637 (ALT 250 FOR IP): Performed by: STUDENT IN AN ORGANIZED HEALTH CARE EDUCATION/TRAINING PROGRAM

## 2022-09-20 PROCEDURE — 80053 COMPREHEN METABOLIC PANEL: CPT

## 2022-09-20 PROCEDURE — 82962 GLUCOSE BLOOD TEST: CPT

## 2022-09-20 PROCEDURE — 1200000000 HC SEMI PRIVATE

## 2022-09-20 PROCEDURE — 6370000000 HC RX 637 (ALT 250 FOR IP): Performed by: SPECIALIST

## 2022-09-20 PROCEDURE — 6360000002 HC RX W HCPCS: Performed by: INTERNAL MEDICINE

## 2022-09-20 PROCEDURE — 6360000002 HC RX W HCPCS: Performed by: SPECIALIST

## 2022-09-20 PROCEDURE — 36415 COLL VENOUS BLD VENIPUNCTURE: CPT

## 2022-09-20 PROCEDURE — 6370000000 HC RX 637 (ALT 250 FOR IP): Performed by: INTERNAL MEDICINE

## 2022-09-20 PROCEDURE — 6360000002 HC RX W HCPCS: Performed by: STUDENT IN AN ORGANIZED HEALTH CARE EDUCATION/TRAINING PROGRAM

## 2022-09-20 PROCEDURE — 99232 SBSQ HOSP IP/OBS MODERATE 35: CPT | Performed by: INTERNAL MEDICINE

## 2022-09-20 PROCEDURE — 85025 COMPLETE CBC W/AUTO DIFF WBC: CPT

## 2022-09-20 RX ADMIN — WATER 2000 MG: 1 INJECTION INTRAMUSCULAR; INTRAVENOUS; SUBCUTANEOUS at 15:24

## 2022-09-20 RX ADMIN — ASPIRIN 81 MG: 81 TABLET, COATED ORAL at 11:02

## 2022-09-20 RX ADMIN — INSULIN GLARGINE 5 UNITS: 100 INJECTION, SOLUTION SUBCUTANEOUS at 20:52

## 2022-09-20 RX ADMIN — METRONIDAZOLE 500 MG: 500 TABLET ORAL at 09:22

## 2022-09-20 RX ADMIN — ENOXAPARIN SODIUM 40 MG: 100 INJECTION SUBCUTANEOUS at 11:03

## 2022-09-20 RX ADMIN — Medication 100 UNITS: at 09:23

## 2022-09-20 RX ADMIN — ATORVASTATIN CALCIUM 40 MG: 40 TABLET, FILM COATED ORAL at 20:46

## 2022-09-20 RX ADMIN — INSULIN LISPRO 1 UNITS: 100 INJECTION, SOLUTION INTRAVENOUS; SUBCUTANEOUS at 20:52

## 2022-09-20 RX ADMIN — CALCIUM CARBONATE (ANTACID) CHEW TAB 500 MG 500 MG: 500 CHEW TAB at 09:35

## 2022-09-20 RX ADMIN — CYCLOBENZAPRINE HYDROCHLORIDE 10 MG: 5 TABLET, FILM COATED ORAL at 20:46

## 2022-09-20 RX ADMIN — GABAPENTIN 300 MG: 300 CAPSULE ORAL at 20:46

## 2022-09-20 RX ADMIN — GABAPENTIN 300 MG: 300 CAPSULE ORAL at 09:22

## 2022-09-20 RX ADMIN — CALCIUM CARBONATE (ANTACID) CHEW TAB 500 MG 500 MG: 500 CHEW TAB at 20:46

## 2022-09-20 RX ADMIN — METRONIDAZOLE 500 MG: 500 TABLET ORAL at 20:46

## 2022-09-20 RX ADMIN — METRONIDAZOLE 500 MG: 500 TABLET ORAL at 15:24

## 2022-09-20 RX ADMIN — Medication 100 UNITS: at 20:49

## 2022-09-20 RX ADMIN — Medication 10 ML: at 09:22

## 2022-09-20 ASSESSMENT — PAIN SCALES - GENERAL: PAINLEVEL_OUTOF10: 0

## 2022-09-20 NOTE — PROGRESS NOTES
NAME: Ema Primrose  MR:  [id number]  :   1960  DATE OF SERVICE:22    This is a face to face encounter with Ema Primrose 60 y.o. male on 22  Elements of this note, including Diagnosis,  Interval History, Past Medical/Surgical/Family/Social Histories, ROS, physical exam, and Assessment and Plan were copied and pasted from Previous. Updates have been made where noted and reflect current exam and medical decision making from the DOS of this encounter. CHIEF COMPLAINT     ID following for   Chief Complaint   Patient presents with    Fatigue     Onset several days-hx left foot diabetic wound     HISTORY OF PRESENT ILLNESS     Pt seen and examined  22  In bed has no c/o RA  Supine   Le dressed     Afebrile on RA    Wbc14.7 cr0.7    tissue cx anaerobic gnr/gpc    foor cx MRSA/strep mixed GNR   blood cx BHS group A /S pyogenes    Patient is tolerating medications. No reported adverse drug reactions. REVIEW OF SYSTEMS     As stated above in the chief complaint, otherwise negative.   CURRENT MEDICATIONS     Current Facility-Administered Medications:     heparin flush 100 UNIT/ML injection 100 Units, 100 Units, IntraCATHeter, BID, Cristian Cabrera MD, 100 Units at 22 4274    cefTRIAXone (ROCEPHIN) 2,000 mg in sterile water 20 mL IV syringe, 2,000 mg, IntraVENous, Q24H, Veena Burrell MD, 2,000 mg at 22 1504    metroNIDAZOLE (FLAGYL) tablet 500 mg, 500 mg, Oral, 3 times per day, Veena Burrell MD, 500 mg at 22 4114    sodium chloride flush 0.9 % injection 10 mL, 10 mL, IntraVENous, 2 times per day, Veena Burrell MD, 10 mL at 22 09    sodium chloride flush 0.9 % injection 10 mL, 10 mL, IntraVENous, PRN, Veena Burrell MD    0.9 % sodium chloride infusion, 25 mL, IntraVENous, PRN, Veena uBrrell MD    insulin glargine (LANTUS) injection vial 5 Units, 5 Units, SubCUTAneous, Nightly, Lamont Wells DO, 5 Units at 22    povidone-iodine (BETADINE) 10 % external solution, , Topical, PRN, Facundo Mukherjee MD    calcium carbonate (TUMS) chewable tablet 500 mg, 500 mg, Oral, TID PRN, Courtney Goldsmith DO, 500 mg at 09/20/22 0935    gabapentin (NEURONTIN) capsule 300 mg, 300 mg, Oral, BID, Yeison Ahammad, DPM, 300 mg at 09/20/22 2896    [Held by provider] clopidogrel (PLAVIX) tablet 75 mg, 75 mg, Oral, Daily, Yeison Ahammad, DPM, 75 mg at 09/17/22 0848    aspirin EC tablet 81 mg, 81 mg, Oral, Daily, Yeison Ahammad, DPM, 81 mg at 09/20/22 1102    melatonin tablet 9 mg, 9 mg, Oral, Nightly PRN, Yeison Ahammad, DPM, 9 mg at 09/17/22 2239    diphenhydrAMINE (BENADRYL) tablet 25 mg, 25 mg, Oral, Q6H PRN, Yeison Ahammad, DPM, 25 mg at 09/17/22 2239    atorvastatin (LIPITOR) tablet 40 mg, 40 mg, Oral, Nightly, Yeison Ahammad, DPM, 40 mg at 09/19/22 2010    cyclobenzaprine (FLEXERIL) tablet 10 mg, 10 mg, Oral, Nightly, Yeison Ahammad, DPM, 10 mg at 09/19/22 2009    glucose chewable tablet 16 g, 4 tablet, Oral, PRN, Yeison Ahammad, DPM    dextrose bolus 10% 125 mL, 125 mL, IntraVENous, PRN **OR** dextrose bolus 10% 250 mL, 250 mL, IntraVENous, PRN, Yeison Ahammad, DPM    glucagon (rDNA) injection 1 mg, 1 mg, SubCUTAneous, PRN, Yeison Ahammad, DPM    dextrose 10 % infusion, , IntraVENous, Continuous PRN, Yeison Ahammad, DPM    insulin lispro (HUMALOG) injection vial 0-4 Units, 0-4 Units, SubCUTAneous, Q4H, Yeison Ahammad, DPM    0.9 % sodium chloride infusion, , IntraVENous, PRN, Yeison Ahammad, DPM    enoxaparin (LOVENOX) injection 40 mg, 40 mg, SubCUTAneous, Daily, Yeison Ahammad, DPM, 40 mg at 09/20/22 1103    promethazine (PHENERGAN) tablet 12.5 mg, 12.5 mg, Oral, Q6H PRN **OR** ondansetron (ZOFRAN) injection 4 mg, 4 mg, IntraVENous, Q6H PRN, Yeison Ahammad, DPM, 4 mg at 09/15/22 4282    polyethylene glycol (GLYCOLAX) packet 17 g, 17 g, Oral, Daily PRN, Yeison Ahammad, DPM    acetaminophen (TYLENOL) tablet 650 mg, 650 mg, Oral, Q6H PRN **OR** acetaminophen (TYLENOL) suppository 650 mg, 650 mg, Rectal, Q6H PRN, Yeison Flanagan, DPVELVET  PHYSICAL EXAM     BP (!) 145/75   Pulse 77   Temp 98.4 °F (36.9 °C) (Oral)   Resp 16   Ht 5' 11\" (1.803 m)   Wt 192 lb (87.1 kg)   SpO2 96%   BMI 26.78 kg/m²   Temp  Av.1 °F (36.7 °C)  Min: 97.7 °F (36.5 °C)  Max: 98.4 °F (36.9 °C)  Constitutional:  The patient is awake, alert  nad   Skin:    Warm and dry. No rash   HEENT:     AT/NC  Chest:   No use of accessory muscles to breathe. Symmetrical expansion. On RA  Cardiovascular:  S1 and S2 are rhythmic and regular. Abdomen:    Benign to palpation. DISTENDED  Extremities:   No edema. LEFT LE ACED         CNS    AAxO         Peripherally Inserted Central Catheter:  PICC Single Lumen 22 Right Brachial (Active)   Central Line Being Utilized Yes 22 0932   Criteria for Appropriate Use Long term IV/antibiotic administration 22 0932   Site Assessment Clean;Dry; Intact 22 1036   Phlebitis Assessment No symptoms 22 0932   Infiltration Assessment 0 22 0932   Lumen Color/Status Pink;Flushed 22 1036   Extremity Circumference (cm) 32 cm 22 0932   Alcohol Cap Used Yes 22 0932   Date of Last Dressing Change 22 0932   Dressing Type Transparent; Antimicrobial;Securing device 22 1036   Dressing Status Clean, dry & intact 22 1036        DIAGNOSTIC RESULTS   Radiology:    Recent Labs     22  0900 22  0650 22  0554   WBC 11.3 12.6* 14.7*   RBC 3.34* 3.41* 3.27*   HGB 9.6* 9.8* 9.5*   HCT 28.7* 29.1* 27.6*   MCV 85.9 85.3 84.4   MCH 28.7 28.7 29.1   MCHC 33.4 33.7 34.4   RDW 14.1 14.1 14.2    413 448   MPV 9.3 9.1 9.2       Recent Labs     22  0900 22  0650 22  0554   * 128* 129*   K 3.9 3.9 3.7    100 101   CO2 19* 18* 19*   BUN 16 15 15   CREATININE 0.7 0.6* 0.7   GLUCOSE 161* 130* 138*   PROT  --  6.6 6.8   LABALBU  --  2.3* 2.5*   CALCIUM 8.5* 8.6 8.2*   BILITOT --  0.5 0.4   ALKPHOS  --  95 102   AST  --  25 25   ALT  --  19 18       Lab Results   Component Value Date    CRP 24.9 (H) 09/13/2022    CRP 2.3 (H) 02/14/2021     Lab Results   Component Value Date    SEDRATE 90 (H) 09/13/2022    SEDRATE 87 (H) 02/14/2021     Recent Labs     09/19/22  0650 09/20/22  0554   AST 25 25   ALT 19 18       Lab Results   Component Value Date/Time    CHOL 164 03/01/2016 08:10 AM    TRIG 170 03/01/2016 08:10 AM    HDL 43 03/01/2016 08:10 AM    LDLCALC 87 03/01/2016 08:10 AM    LABVLDL 34 03/01/2016 08:10 AM     Lab Results   Component Value Date/Time    VITD25 12 (L) 12/28/2020 09:15 AM       Microbiology:   Recent Labs     09/19/22  1315   COVID19 Not Detected       Lab Results   Component Value Date/Time    BC 5 Days no growth 09/15/2022 07:24 AM    BC 5 Days no growth 02/16/2021 04:37 PM    BC Previously positive blood culture called 02/14/2021 07:07 PM    BC  02/14/2021 07:07 PM     Refer to previous culture for susceptibility results  of CXBL2 (LAC) collected 02/14/2021 at 19:07      BLOODCULT2 5 Days no growth 09/15/2022 07:25 AM    BLOODCULT2 Previously positive blood culture called 09/13/2022 02:59 PM    BLOODCULT2  09/13/2022 02:59 PM     Refer to previous culture CXBL 9/13/2022 1459 for susceptibility results    ORG Anaerobic gram negative justo 09/14/2022 04:41 PM    ORG Anaerobic gram positive cocci 09/14/2022 04:41 PM    ORG BHS Group A (Strep pyogenes) 09/13/2022 02:59 PM    ORG BHS Group A (Strep pyogenes) 09/13/2022 02:59 PM    ORG Staphylococcus aureus 09/13/2022 02:59 PM    ORG Strep pyogenes (Beta Strep Group A) 09/13/2022 02:59 PM     WOUND/ABSCESS   Date Value Ref Range Status   09/13/2022 (A)  Final    Mixed laura isolated. Further workup and sensitivity testing  is not routinely indicated and will not be performed.   Mixed laura isolated includes:  Mixed Gram negative rods  Gram positive rods catalase negative     09/13/2022   Final    Heavy growth  This isolate is presumed to be resistant based on the  detection of inducible Clindamycin resistance. Clindamycin  may still be effective in some patients. 09/13/2022 Heavy growth  Final     Smear, Respiratory   Date Value Ref Range Status   01/01/2021   Final    Group 6: <25 PMN's/LPF and <25 Epithelial cells/LPF  Rare Polymorphonuclear leukocytes  Epithelial cells not seen  No organisms seen           Component Value Date/Time    AFBCX No growth after 6 weeks of incubation. 02/16/2021 0732    AFBCX No growth after 6 weeks of incubation. 02/16/2021 0730    FUNGSM No Fungal elements seen 09/14/2022 1641    LABFUNG No growth after 4 weeks of incubation. 02/16/2021 0732    LABFUNG No growth after 4 weeks of incubation. 02/16/2021 0730       MRSA Culture Only   Date Value Ref Range Status   12/26/2020 Methicillin resistant Staph aureus not isolated  Final     CULTURE, RESPIRATORY   Date Value Ref Range Status   01/01/2021 Oral Pharyngeal Hannah absent  Growth not present    Final        FINAL IMPRESSION    Pt had   Chief Complaint   Patient presents with    Fatigue     Onset several days-hx left foot diabetic wound    Admitted for Lactic acidosis [E87.2]  HAS SEPSIS  -Streptococcus species SEPTICEMIA 9/13  Foot cx MSSA/Strep   -LEUKOCYTOSIS trending down   -LEFT LE CELLULITIS  -LEFT HEEL OM   Leukocytosis follow   PROCEDURES:  1. Incision and drainage, left calcaneus bone. 2.  Incision and drainage posterior left leg. cefTRIAXone (ROCEPHIN) 2,000 mg in sterile water 20 mL IV syringe, Q24H  metroNIDAZOLE (FLAGYL) tablet 500 mg, 3 times per day     Awaiting surgery Left foot partial calcanectomy with debridement to be performed by Dr. Arnulfo Petersen on Wednesday 9/21  Picc in  F/u cbc    To follow ordered labs, cultures and imaging. Imaging and labs were reviewed per medical records. The patient was educated about the diagnosis, prognosis, indications, risks and benefits of treatment.      An opportunity to ask questions was given to the patient/FAMILY. Thank you for involving me in the care of Wyatt Reaves I will continue to follow. Please do not hesitate to call for any questions or concerns.     Electronically signed by Abraham Tejeda MD on 9/20/2022 at 12:43 PM

## 2022-09-20 NOTE — PROGRESS NOTES
[address] [place], [place] [address]ist   Progress Note    Admitting Date and Time: 9/13/2022 12:00 PM  Admit Dx: Lactic acidosis [E87.2]  Septicemia ([address] [A41.9]  Wound infection [T14. 8XXA, L08.9]  Wound cellulitis [L03.90]  Sepsis ([address] [A41.9]    Subjective:    9/18: Pt  sitting up in bed in no acute distress. States feels well today. For procedure in am. ? PICC line placement. Denies any new concerns at this time. 9/19: Pt somewhat frustrated that he was NPO most of the day and then procedure got cancelled. Per RN, urgery got postponed due to scheduling issues for OR time. He was placed on schedule for tomorrow. PICC line placed this morning by IV team.    9/20: Pt  is hopeful he can get surgery tomorrow. Per RN: surgery postpone today but is no schedule for tomorrow.      heparin flush  100 Units IntraCATHeter BID    cefTRIAXone (ROCEPHIN) IV  2,000 mg IntraVENous Q24H    metroNIDAZOLE  500 mg Oral 3 times per day    sodium chloride flush  10 mL IntraVENous 2 times per day    insulin glargine  5 Units SubCUTAneous Nightly    gabapentin  300 mg Oral BID    [Held by provider] clopidogrel  75 mg Oral Daily    aspirin  81 mg Oral Daily    atorvastatin  40 mg Oral Nightly    cyclobenzaprine  10 mg Oral Nightly    insulin lispro  0-4 Units SubCUTAneous Q4H    enoxaparin  40 mg SubCUTAneous Daily     sodium chloride flush, 10 mL, PRN  sodium chloride, 25 mL, PRN  povidone-iodine, , PRN  calcium carbonate, 500 mg, TID PRN  melatonin, 9 mg, Nightly PRN  diphenhydrAMINE, 25 mg, Q6H PRN  glucose, 4 tablet, PRN  dextrose bolus, 125 mL, PRN   Or  dextrose bolus, 250 mL, PRN  glucagon (rDNA), 1 mg, PRN  dextrose, , Continuous PRN  sodium chloride, , PRN  promethazine, 12.5 mg, Q6H PRN   Or  ondansetron, 4 mg, Q6H PRN  polyethylene glycol, 17 g, Daily PRN  acetaminophen, 650 mg, Q6H PRN   Or  acetaminophen, 650 mg, Q6H PRN       Objective:    BP (!) 145/75   Pulse 77   Temp 98.4 °F (36.9 °C) (Oral)   Resp 16   Ht 5' 11\" (1.803 m)   Wt 192 lb (87.1 kg)   SpO2 96%   BMI 26.78 kg/m²   General Appearance: alert and oriented to person, place and time and in no acute distress  Skin: warm and dry  Head: normocephalic and atraumatic  Eyes: pupils equal, round, and reactive to light, extraocular eye movements intact, conjunctivae normal  Neck: neck supple and non tender without mass   Pulmonary/Chest: clear to auscultation bilaterally- no wheezes, rales or rhonchi, normal air movement, no respiratory distress  Cardiovascular: normal rate, normal S1 and S2 and no RGM  Abdomen: soft, non-tender, non-distended, normal bowel sounds  Extremities: no cyanosis, no clubbing and no edema. LLE Surgical dressing CDI. Neurologic: no cranial nerve deficit and speech normal      Recent Labs     09/18/22  0900 09/19/22  0650 09/20/22  0554   * 128* 129*   K 3.9 3.9 3.7    100 101   CO2 19* 18* 19*   BUN 16 15 15   CREATININE 0.7 0.6* 0.7   GLUCOSE 161* 130* 138*   CALCIUM 8.5* 8.6 8.2*         Recent Labs     09/19/22  0650 09/20/22  0554   ALKPHOS 95 102   PROT 6.6 6.8   LABALBU 2.3* 2.5*   BILITOT 0.5 0.4   AST 25 25   ALT 19 18         Recent Labs     09/18/22  0900 09/19/22  0650 09/20/22  0554   WBC 11.3 12.6* 14.7*   RBC 3.34* 3.41* 3.27*   HGB 9.6* 9.8* 9.5*   HCT 28.7* 29.1* 27.6*   MCV 85.9 85.3 84.4   MCH 28.7 28.7 29.1   MCHC 33.4 33.7 34.4   RDW 14.1 14.1 14.2    413 448   MPV 9.3 9.1 9.2              Radiology:   VL LOWER EXTREMITY ARTERIAL SEGMENTAL PRESSURES W PPG BILATERAL   Final Result   In spite of normal bilateral ankle-brachial indices, Doppler and PVR   waveforms suggest at least mild, symmetric bilateral lower extremity arterial   stenosis. Correlation with CTA of the abdomen/pelvis with lower extremity   runoff could be considered for better assessment. XR FOOT LEFT (2 VIEWS)   Final Result   Marked soft tissue swelling in the dorsal aspect of the mid and distal foot.       Slight interval improvement of soft tissue ulceration in the heel and   cortical irregularity along the posterior margin of the calcaneus. Question hairline fracture, proximal phalanx of 5th toe. Please correlate   with focal tenderness. XR CHEST 1 VIEW   Final Result   No acute process. There are multiple old left-sided rib fractures. Assessment:  Principal Problem:    Sepsis due to group A beta-hemolytic Streptococcus ([address]  Active Problems:    Diabetes mellitus ([address]    Osteomyelitis of left lower extremity (HCC)    Alcohol abuse    Coronary artery disease involving native coronary artery of native heart without angina pectoris    Sepsis due to cellulitis (HCC)    Lactic acidosis    Uncontrolled type 2 diabetes mellitus with hyperglycemia (HCC)    Type 2 diabetes mellitus with left diabetic foot ulcer ([address]    Bacteremia due to Gram-positive bacteria    Hyponatremia    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 2 diabetes mellitus, with necrosis of bone ([address]  Resolved Problems:    * No resolved hospital problems. *      Plan:                Sepsis due to cellulitis, left calcaneal osteomyelitis as well as Strep pyogenes bacteremia in patient with left diabetic foot ulcer  -Sepsis evidenced by leukocytosis (33.6), tachycardia (102) and cellulitis of the foot  -Podiatry and ID following  -Status post incision and drainage of left calcaneus and posterior left leg on 9/14. Plan for left partial calcanectomy with debridement on 9/21 by podiatry  -Blood cultures with strep pyogenes, wound cultures with strep pyogenes and staph aureus. Antibiotics narrowed to ceftriaxone 2 g IV daily and metronidazole 500 mg p.o. every 8 hours per ID; day #4. Prior to this completed 4 days of cefepime and 3 days of Vanco.  -Discussed that he will need long term IV antibiotics. Repeat blood cultures show no growth. SL PICC line placed today      2.          Lactic acidosis(resolved)  -Due to the above but resolved with IV fluid hydration  -Lactic acid trend as follows 4.3-->4.1- >1.9     3. Type II diabetes mellitus  -Added Lantus 5 units nightly in addition to corrective scale with improved glycemic control      4. Hypertension/CAD  -Hydrochlorothiazide on hold given hyponatremia  -Holding Plavix in anticipation of surgery 9/21     5. Hyponatremia  -likely component of hypovolemia due to sepsis, however he has had this in the past due to alcohol abuse. States he has been drinking 6-8 beers a day lately  -Now that he has been volume resuscitated from sepsis standpoint, the fluids have been discontinued  -Sodium is correcting at an appropriate rate;129 this morning. Repeat chemistries tomorrow morning     6. Alcohol abuse  -6-8 beers per day  -counseled on cessation   -no signs of withdrawal syndrome at this time, but will have low threshold for initiating CIWA protocol but appears to be out of window for this. Disposition: Patient is now agreeable to SNF or LTAC. At least several more days in the hospital for surgery on 9/20, postoperative care, and PICC line placement    NOTE: This report was transcribed using voice recognition software. Every effort was made to ensure accuracy; however, inadvertent computerized transcription errors may be present.      Electronically signed by Rafal Stevens MD on 9/20/2022 at 2:40 PM

## 2022-09-20 NOTE — PROGRESS NOTES
Comprehensive Nutrition Assessment    Type and Reason for Visit:  Initial, RD Nutrition Re-Screen/LOS    Nutrition Recommendations/Plan:   Continue NPO, will monitor nutritional progression     Malnutrition Assessment:  Malnutrition Status: At risk for malnutrition (Comment) (09/20/22 4258)    Context:  Chronic Illness     Findings of the 6 clinical characteristics of malnutrition:  Energy Intake:  Mild decrease in energy intake (Comment)  Weight Loss:  Unable to assess (d/t lack of accurate wt hx in EMR)     Body Fat Loss:  No significant body fat loss     Muscle Mass Loss:  No significant muscle mass loss    Fluid Accumulation:  No significant fluid accumulation     Strength:  Not Performed    Nutrition Assessment:    Pt admits w/ Chills, fatigue, fever & worsening of wound in lt foot, dx Septicemia/Wound cellulitis/Lactic acidosis. PHx CVA, DM, HTN, Alcohol abuse. Noted 9/14/22 S/P LLE I&D, Debridement. Noted Pt currenly NPO pending partial calcanectomy & [facility]   Will monitor nutrition progression. Nutrition Related Findings:    A/O x4, rounded/soft/nontender abd +BS, II/O -5.1L, +trace BLE & +2 perineal edema, (H), Na+ 129(L), Wound Type: Surgical Incision (S/P LLE I&D, Debridement.)       Current Nutrition Intake & Therapies:    Average Meal Intake: NPO  Average Supplements Intake: None Ordered  Diet NPO Exceptions are: Sips of Water with Meds  ADULT DIET; Regular; 3 carb choices (45 gm/meal)    Anthropometric Measures:  Height: 5' 11\" (180.3 cm)  Ideal Body Weight (IBW): 172 lbs (78 kg)    Admission Body Weight: 192 lb (87.1 kg) (9/13 actual)  Current Body Weight: 192 lb (87.1 kg) (9/13 actual), 111.6 % IBW. Current BMI (kg/m2): 26.8  Usual Body Weight:  (No accurate wt hx in EMR)     Weight Adjustment For: No Adjustment     BMI Categories: Overweight (BMI 25.0-29. 9)    Estimated Daily Nutrient Needs:  Energy Requirements Based On: Formula  Weight Used for Energy Requirements: Current  Energy (kcal/day): 5676-3144  Weight Used for Protein Requirements: Ideal  Protein (g/day): 100-115 (1.3-1.5 gm/kg)  Method Used for Fluid Requirements: 1 ml/kcal  Fluid (ml/day): 2100-2200    Nutrition Diagnosis:   Increased nutrient needs related to increase demand for energy/nutrients as evidenced by wounds, NPO or clear liquid status due to medical condition    Nutrition Interventions:   Food and/or Nutrient Delivery: Continue NPO  Nutrition Education/Counseling: No recommendation at this time  Coordination of Nutrition Care: Continue to monitor while inpatient     Goals:     Goals: other (specify)  Specify Other Goals: monitor nutrition progression    Nutrition Monitoring and Evaluation:   Behavioral-Environmental Outcomes: None Identified  Food/Nutrient Intake Outcomes: Diet Advancement/Tolerance (when medically feasible)  Physical Signs/Symptoms Outcomes: Biochemical Data, GI Status, Fluid Status or Edema, Weight, Skin, Nutrition Focused Physical Findings    Discharge Planning:     Too soon to determine     Jeanette Flow, RD  Contact: [phone]

## 2022-09-20 NOTE — ANESTHESIA PRE PROCEDURE
325 (65 Fe) MG tablet Take 325 mg by mouth daily (with breakfast)    Historical Provider, MD   vitamin D (ERGOCALCIFEROL) 1.25 MG (85442 UT) CAPS capsule Take 1 capsule by mouth once a week 1/12/21   Rosealee Sandifer, MD   melatonin 3 MG TABS tablet Take 9 mg by mouth nightly as needed    Historical Provider, MD   aspirin 81 MG EC tablet Take 81 mg by mouth daily    Historical Provider, MD   clopidogrel (PLAVIX) 75 MG tablet Take 1 tablet by mouth daily 11/16/15   Mena Perez DO       Current medications:    Current Facility-Administered Medications   Medication Dose Route Frequency Provider Last Rate Last Admin    heparin flush 100 UNIT/ML injection 100 Units  100 Units IntraCATHeter BID Frank Valerio MD   100 Units at 09/20/22 0923    cefTRIAXone (ROCEPHIN) 2,000 mg in sterile water 20 mL IV syringe  2,000 mg IntraVENous Q24H Chung Valdez MD   2,000 mg at 09/19/22 1504    metroNIDAZOLE (FLAGYL) tablet 500 mg  500 mg Oral 3 times per day Chung Valdez MD   500 mg at 09/20/22 7095    sodium chloride flush 0.9 % injection 10 mL  10 mL IntraVENous 2 times per day Chung Valdez MD   10 mL at 09/20/22 0922    sodium chloride flush 0.9 % injection 10 mL  10 mL IntraVENous PRN Chung Valdez MD        0.9 % sodium chloride infusion  25 mL IntraVENous PRN Chung Valdez MD        insulin glargine (LANTUS) injection vial 5 Units  5 Units SubCUTAneous Nightly Lamont Wells DO   5 Units at 09/18/22 1955    povidone-iodine (BETADINE) 10 % external solution   Topical PRN Viridiana Koch MD        calcium carbonate (TUMS) chewable tablet 500 mg  500 mg Oral TID PRN Lamont Wells DO   500 mg at 09/20/22 0935    gabapentin (NEURONTIN) capsule 300 mg  300 mg Oral BID Yeison Ahammad, DPM   300 mg at 09/20/22 3489    [Held by provider] clopidogrel (PLAVIX) tablet 75 mg  75 mg Oral Daily Yeison Ahammad, DPM   75 mg at 09/17/22 0848    aspirin EC tablet 81 mg  81 mg Oral Daily Yeisonyash Taylormmad, DPM   81 mg at 09/20/22 1102    melatonin tablet 9 mg  9 mg Oral Nightly PRN Yeison Ahammad, DPM   9 mg at 09/17/22 2239    diphenhydrAMINE (BENADRYL) tablet 25 mg  25 mg Oral Q6H PRN Yeison Ahammad, DPM   25 mg at 09/17/22 2239    atorvastatin (LIPITOR) tablet 40 mg  40 mg Oral Nightly Yeison Ahammad, DPM   40 mg at 09/19/22 2010    cyclobenzaprine (FLEXERIL) tablet 10 mg  10 mg Oral Nightly Yeison Ahammad, DPM   10 mg at 09/19/22 2009    glucose chewable tablet 16 g  4 tablet Oral PRN Yeison Ahammad, DPM        dextrose bolus 10% 125 mL  125 mL IntraVENous PRN Yeison Ahammad, DPM        Or    dextrose bolus 10% 250 mL  250 mL IntraVENous PRN Yeison Ahammad, DPM        glucagon (rDNA) injection 1 mg  1 mg SubCUTAneous PRN Yeison Ahammad, DPM        dextrose 10 % infusion   IntraVENous Continuous PRN Yeison Ahammad, DPM        insulin lispro (HUMALOG) injection vial 0-4 Units  0-4 Units SubCUTAneous Q4H Yeison Ahammad, DPM        0.9 % sodium chloride infusion   IntraVENous PRN Yeison Ahammad, DPM        enoxaparin (LOVENOX) injection 40 mg  40 mg SubCUTAneous Daily Yeison Ahammad, DPM   40 mg at 09/20/22 1103    promethazine (PHENERGAN) tablet 12.5 mg  12.5 mg Oral Q6H PRN Yeison Ahammad, DPM        Or    ondansetron (ZOFRAN) injection 4 mg  4 mg IntraVENous Q6H PRN Yeison Ahammad, DPM   4 mg at 09/15/22 1652    polyethylene glycol (GLYCOLAX) packet 17 g  17 g Oral Daily PRN Yeison Ahammad, DPM        acetaminophen (TYLENOL) tablet 650 mg  650 mg Oral Q6H PRN Yeison Ahammad, DPM        Or    acetaminophen (TYLENOL) suppository 650 mg  650 mg Rectal Q6H PRN Yeison Ahammad, DPM           Allergies:     Allergies   Allergen Reactions    Pcn [Penicillins] Anaphylaxis       Problem List:    Patient Active Problem List   Diagnosis Code    CVA (cerebral vascular accident) ([facility] I63.9    Diabetes mellitus ([facility] E11.9    Hyponatremia E87.1    Diabetic foot infection ([facility] E11.628, L08.9    Hypertension I10    Hemiparesis affecting left side as late effect of cerebrovascular accident [facility]) N71.497    Peripheral arterial disease ([address] I73.9    Urinary tract infection due to Proteus N39.0, B96.4    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 2 diabetes mellitus, with necrosis of bone ([facility]) E11.621, L97.424    Ulcer of left heel, with necrosis of muscle ([facility]) L97.423    Atherosclerosis of native artery of left lower extremity with ulceration of heel ([facility]) I70.244    Peripheral vascular angioplasty status Z98.62    Ulcer of left heel, with necrosis of bone ([facility]) L97.424    PVD (peripheral vascular disease) ([facility]) I73.9    Osteomyelitis of left lower extremity ([facility]) M86.9    Alcohol abuse F10.10    Sepsis due to group A beta-hemolytic Streptococcus ([facility]) A40.0    Coronary artery disease involving native coronary artery of native heart without angina pectoris I25.10    Sepsis due to cellulitis ([facility]) L03.90, A41.9    Lactic acidosis E87.2    Uncontrolled type 2 diabetes mellitus with hyperglycemia ([facility]) E11.65    Type 2 diabetes mellitus with left diabetic foot ulcer ([facility]) E11.621, L97.529    Bacteremia due to Gram-positive bacteria R78.81       Past Medical History:        Diagnosis Date    Arthritis     Bilateral carpal tunnel syndrome 2016    Cerebral artery occlusion with cerebral infarction ([address]     Chronic back pain     Coronary artery disease involving native coronary artery of native heart without angina pectoris 9/16/2022    CVA (cerebral vascular accident) ([address] 11/2015, 2017    Diabetes mellitus ([address]     Type 2    Diabetic foot ulcer with osteomyelitis ([address] 12/21/2021    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 2 diabetes mellitus, with necrosis of bone ([address] 12/21/2021    Hemiparesis affecting left side as late effect of cerebrovascular accident ([address]     LEFT SIDE NON DOMINANT FOLLOWING STROKE    Hyperlipidemia     Hypertension     Peripheral vascular angioplasty status 2021    Ulcer of left heel, with necrosis of bone ([address] 2021    Ulcer of left heel, with necrosis of muscle [facility] 2021    Vitamin D deficiency        Past Surgical History:        Procedure Laterality Date    CARPAL TUNNEL RELEASE  10/01/2016    Dr. Brandon Nieves Left 2021    LEFT FOOT DEBRIDEMENT WITH BONE BIOPSY, WOUND VAC APPLICATION performed by Gemini Pak DPM at [facility] Left 2022    LEFT FOOT DEBRIDEMENT INCISION AND DRAINAGE performed by Gemini Pak DPM at [id number] [place] [address]    TRANSESOPHAGEAL ECHOCARDIOGRAM N/A 2021    TRANSESOPHAGEAL ECHOCARDIOGRAM WITH BUBBLE STUDY performed by Balaji Carrillo MD at [facility] N/A 2021    EGD BIOPSY performed by Cece Osullivan DO at [facility] History:    Social History     Tobacco Use    Smoking status: Former     Packs/day: 1.00     Years: 25.00     Pack years: 25.00     Types: Cigarettes     Quit date: 2015     Years since quittin.7    Smokeless tobacco: Never   Substance Use Topics    Alcohol use: Not Currently     Comment: Former every day drinker for most of adult life                                Counseling given: Not Answered      Vital Signs (Current):   Vitals:    22 2004 22 0849 22 19222 0924   BP: (!) 152/92 (!) 145/71 (!) 153/88 (!) 145/75   Pulse: 84 76 91 77   Resp: 18  16 16   Temp: 36.8 °C (98.3 °F) 37.1 °C (98.8 °F) 36.5 °C (97.7 °F) 36.9 °C (98.4 °F)   TempSrc: Tympanic Oral Oral Oral   SpO2:  96% 98% 96%   Weight:       Height:                                                  BP Readings from Last 3 Encounters:   22 (!) 145/75   22 122/82   22 (!) 140/70       NPO Status:                                                                                 BMI:   Wt Readings from Last 3 Encounters: 22 192 lb (87.1 kg)   22 192 lb (87.1 kg)   22 192 lb (87.1 kg)     Body mass index is 26.78 kg/m². CBC:   Lab Results   Component Value Date/Time    WBC 14.7 2022 05:54 AM    RBC 3.27 2022 05:54 AM    HGB 9.5 2022 05:54 AM    HCT 27.6 2022 05:54 AM    MCV 84.4 2022 05:54 AM    RDW 14.2 2022 05:54 AM     2022 05:54 AM       CMP:   Lab Results   Component Value Date/Time     2022 05:54 AM    K 3.7 2022 05:54 AM    K 3.6 2022 06:34 AM     2022 05:54 AM    CO2 19 2022 05:54 AM    BUN 15 2022 05:54 AM    CREATININE 0.7 2022 05:54 AM    GFRAA >60 2022 05:54 AM    LABGLOM >60 2022 05:54 AM    GLUCOSE 138 2022 05:54 AM    PROT 6.8 2022 05:54 AM    CALCIUM 8.2 2022 05:54 AM    BILITOT 0.4 2022 05:54 AM    ALKPHOS 102 2022 05:54 AM    AST 25 2022 05:54 AM    ALT 18 2022 05:54 AM       POC Tests: No results for input(s): POCGLU, POCNA, POCK, POCCL, POCBUN, POCHEMO, POCHCT in the last 72 hours.     Coags:   Lab Results   Component Value Date/Time    PROTIME 12.0 2022 09:15 AM    INR 1.1 2022 09:15 AM    APTT 34.1 11/15/2015 10:46 AM       HCG (If Applicable): No results found for: PREGTESTUR, PREGSERUM, HCG, HCGQUANT     ABGs: No results found for: PHART, PO2ART, JJY1GHV, MTF5WXT, BEART, Z3YYCUTX     Type & Screen (If Applicable):  No results found for: LABABO, LABRH    Drug/Infectious Status (If Applicable):  No results found for: HIV, HEPCAB    COVID-19 Screening (If Applicable):   Lab Results   Component Value Date/Time    COVID19 Not Detected 2022 01:15 PM    COVID19 Not Detected 2021 04:15 PM     EK22  Sinus tachycardia  Otherwise normal ECG  When compared with ECG of 2021 20:36,  Criteria for Inferior infarct are no longer present  T wave inversion no longer evident in Inferior leads  Confirmed by Jenny Natarajan ([id number]) on 9/14/2022 5:55:00 PM    ECHO: 2/22/21   Left Ventricle   Normal left ventricular chamber size. Normal left ventricular systolic function. Right Ventricle   Normal right ventricle size and function. Left Atrium   Left atrium is mildly enlarged. Interatrial septum intact. Agitated saline injection showed some right to left shunt suggestive of   small PFO. Right Atrium   Normal right atrium. Mitral Valve   Mitral annular calcification is present. No mitral valve prolapse. No vegetations. Tricuspid Valve   Normal tricuspid valve structure. No vegetations. Aortic Valve   The aortic valve appears mildly sclerotic. There is mild to moderate aortic regurgitation. No vegetations. Pulmonic Valve   The pulmonic valve was not well visualized. Pericardial Effusion   No evidence of pericardial effusion. Pericardium appears normal.      Pleural Effusion   No evidence of pleural effusion. Aorta   Normal aortic root size . There is a grade II plaque noted in the descending of the thoracic aorta. Conclusions      Summary   No LYRIC indication of Endocarditis. Agitated saline injection showed some right to left shunt suggestive of   small PFO. Normal left ventricular size and systolic function. Left atrium is mildly enlarged. Interatrial septum intact. Mitral annular calcification is present. No mitral valve prolapse. The aortic valve appears mildly sclerotic. There is mild to moderate aortic regurgitation. Normal aortic root size. There is a grade II plaque noted in the descending of the thoracic aorta. No evidence of pericardial effusion. Compared to prior trans-thoracic echo from 2/17/2021. * Report amended to add evidence of small PFO.       Anesthesia Evaluation  Patient summary reviewed no history of anesthetic complications:   Airway: Mallampati: II  TM distance: >3 FB   Neck ROM: full  Mouth opening: > = 3 FB   Dental:          Pulmonary:Negative Pulmonary ROS and normal exam  breath sounds clear to auscultation                             Cardiovascular:    (+) hypertension:, CAD:, hyperlipidemia      ECG reviewed  Rhythm: regular  Rate: normal  Echocardiogram reviewed         Beta Blocker:  Not on Beta Blocker         Neuro/Psych:   (+) CVA (left side weakness ): residual symptoms, neuromuscular disease (left heel necrosis ):, psychiatric history (alcohol abuse ):            GI/Hepatic/Renal:            ROS comment: Lower abdominal hernia. Endo/Other:    (+) DiabetesType II DM, poorly controlled, using insulin, : arthritis:., .                 Abdominal:   (+) obese,           Vascular:   + PVD, aortic or cerebral, . Other Findings:           Anesthesia Plan      MAC     ASA 4       Induction: intravenous. Anesthetic plan and risks discussed with patient. Use of blood products discussed with patient whom consented to blood products. Plan discussed with attending and CRNA. Lizbet Field RN   9/20/2022  DOS STAFF ADDENDUM:    Pt seen and examined, chart reviewed (including anesthesia, drug and allergy history). Anesthetic plan, risks, benefits, alternatives, and personnel involved discussed with patient. Patient verbalized an understanding and agrees to proceed. Plan discussed with care team members and agreed upon.     Amari Lozoya MD  Staff Anesthesiologist  1:40 PM

## 2022-09-20 NOTE — PROGRESS NOTES
Department of Podiatry  Progress Note      SUBJECTIVE:  Mr. Yamila Higgins was evaluated at bedside this morning S/P LLE I&D, Debridement (DOS: 9/14/22). He is resting in bed at the time of visit, denies any complaints at this time. Discussed limited OR availability again today and surgery pushed to tomorrow at 1:30pm. Patient is agreeable. No overnight events reported. OBJECTIVE:    Scheduled Meds:   heparin flush  100 Units IntraCATHeter BID    cefTRIAXone (ROCEPHIN) IV  2,000 mg IntraVENous Q24H    metroNIDAZOLE  500 mg Oral 3 times per day    sodium chloride flush  10 mL IntraVENous 2 times per day    insulin glargine  5 Units SubCUTAneous Nightly    gabapentin  300 mg Oral BID    [Held by provider] clopidogrel  75 mg Oral Daily    aspirin  81 mg Oral Daily    atorvastatin  40 mg Oral Nightly    cyclobenzaprine  10 mg Oral Nightly    insulin lispro  0-4 Units SubCUTAneous Q4H    enoxaparin  40 mg SubCUTAneous Daily     Continuous Infusions:   sodium chloride      dextrose      sodium chloride       PRN Meds:.sodium chloride flush, sodium chloride, povidone-iodine, calcium carbonate, melatonin, diphenhydrAMINE, glucose, dextrose bolus **OR** dextrose bolus, glucagon (rDNA), dextrose, sodium chloride, promethazine **OR** ondansetron, polyethylene glycol, acetaminophen **OR** acetaminophen    Allergies   Allergen Reactions    Pcn [Penicillins] Anaphylaxis       BP (!) 145/75   Pulse 77   Temp 98.4 °F (36.9 °C) (Oral)   Resp 16   Ht 5' 11\" (1.803 m)   Wt 192 lb (87.1 kg)   SpO2 96%   BMI 26.78 kg/m²               EXAM:  Exam largely unchanged from previous assessments: LLE focused exam, mild bleeding with dressing change. NEUROLOGICAL EXAMINATION: Protective sensation absent. VASCULAR EXAMINATION:  DP and PT pulses are palpable. Capillary refill is WNL. Skin temperature is warm to warm from tiibial tuberosity to distal digits. There is absence of hair growth noted.     DERMATOLOGICAL:  Surgical incision site secondary to infected ulcer noted to the posterior aspect lf the left heel and calf measures 20.0x5.5cmx0.5cm with fiber granular wound bed, focal proximal portion of Achilles exposed, clean noninfected at this time. No active drainage, no purulence. . No tunneling or burrowing noted. Mild skin maceration noted distally. Mild erythema and edema improving since surgery. MUSCULOSKELETAL:  MMT Deferred. Equinus. No posterior calf pain on palpation. Positive tenderness palpation wound bed    Scheduled Meds:   heparin flush  100 Units IntraCATHeter BID    cefTRIAXone (ROCEPHIN) IV  2,000 mg IntraVENous Q24H    metroNIDAZOLE  500 mg Oral 3 times per day    sodium chloride flush  10 mL IntraVENous 2 times per day    insulin glargine  5 Units SubCUTAneous Nightly    gabapentin  300 mg Oral BID    [Held by provider] clopidogrel  75 mg Oral Daily    aspirin  81 mg Oral Daily    atorvastatin  40 mg Oral Nightly    cyclobenzaprine  10 mg Oral Nightly    insulin lispro  0-4 Units SubCUTAneous Q4H    enoxaparin  40 mg SubCUTAneous Daily     Continuous Infusions:   sodium chloride      dextrose      sodium chloride       PRN Meds:.sodium chloride flush, sodium chloride, povidone-iodine, calcium carbonate, melatonin, diphenhydrAMINE, glucose, dextrose bolus **OR** dextrose bolus, glucagon (rDNA), dextrose, sodium chloride, promethazine **OR** ondansetron, polyethylene glycol, acetaminophen **OR** acetaminophen    RADIOLOGY:  VL LOWER EXTREMITY ARTERIAL SEGMENTAL PRESSURES W PPG BILATERAL   Final Result   In spite of normal bilateral ankle-brachial indices, Doppler and PVR   waveforms suggest at least mild, symmetric bilateral lower extremity arterial   stenosis. Correlation with CTA of the abdomen/pelvis with lower extremity   runoff could be considered for better assessment. XR FOOT LEFT (2 VIEWS)   Final Result   Marked soft tissue swelling in the dorsal aspect of the mid and distal foot.       Slight interval improvement of soft tissue ulceration in the heel and   cortical irregularity along the posterior margin of the calcaneus. Question hairline fracture, proximal phalanx of 5th toe. Please correlate   with focal tenderness. XR CHEST 1 VIEW   Final Result   No acute process. There are multiple old left-sided rib fractures. BP (!) 145/75   Pulse 77   Temp 98.4 °F (36.9 °C) (Oral)   Resp 16   Ht 5' 11\" (1.803 m)   Wt 192 lb (87.1 kg)   SpO2 96%   BMI 26.78 kg/m²     LABS:    Recent Labs     09/19/22  0650 09/20/22  0554   WBC 12.6* 14.7*   HGB 9.8* 9.5*   HCT 29.1* 27.6*    448        Recent Labs     09/20/22  0554   *   K 3.7      CO2 19*   BUN 15   CREATININE 0.7        Recent Labs     09/19/22  0650 09/20/22  0554   PROT 6.6 6.8         ASSESSMENT:  - S/P LLE I&D,Debridement (dos:9/14/22)  -Full-thickness ulcer down to level of exposed bone with exposed tendon left posterior Heel, POA, infected  -Necrotizing infection posterior Achilles. -Uncontrolled insulin dependent type 2 diabetes mellitus with peripheral neuropathy  -Acute on chronic osteomyelitis left posterior heel, POA  -Positive bacteremia Gram-positive cocci in chains  -Peripheral arterial disease s/p arteriogram with angioplasty      PLAN:  - Examined and evaluated  - All labs, imaging, and charts reviewed  - WBC: 14.7  - Pain Control: IV and PO  - Antibiotics as per ID: Rocephin/Flagyl  -Arterial studies 9/16/2022: MAHOGANY 1.0 right, 1.4 left. Mild symmetrical bilateral arterial stenosis diminutive flow bilateral digits  -Wound cultures 9/13/2022: MSSA, group A strep, mixed GPO, mixed GNR  -Surgical path 9/14/2022: Pending  -X-ray left foot 9/13/22: Positive osteomyelitis posterior calcaneal tuber with marked soft tissue swelling dorsal aspect of mid and distal forefoot. Questionable hairline fracture proximal phalanx of digit.  -Betadine packing DSD left foot daily changes.  Changed at bedside today  - Prevalon boots bilateral  - Planned procedure: Left foot partial calcanectomy with debridement to be performed by Dr. Pearl Hackett on Wednesday 9/21 at 1:30pm  -NPO past midnight    D/W: Lashay Dumont DPM Pod Catrina [id number]  Fellowship-Trained Foot and Ankle Surgeon  Diplomate, [employer] Board [employer] Foot [employer] Ankle Sur[employer]  [phone]     Thank you for involving podiatry in this patients care. Please do not hesitate to call with any questions or concerns.     Bart Bay DPM  9/20/2022   10:30 AM

## 2022-09-20 NOTE — CARE COORDINATION
9/20/2022 1405 CM note: Negative covid 9/19/22. Pt with positive blood cx and acute osteomyelitis left calcaneus. He is for podiatric intervention tomorrow and per ID will need IV Rocephin x 6 weeks. PICC line in place. Pt agreeable for SNF and prefers [facility] at [place]. Per  liaison Eliud Cintron, will review for acceptance after surgery to check wound care/abx costs. Per [facility]-pt does not have LTACH criteriaat this time). For SNF WILL NEED PRECERT, HENS, signed KELLY , covid testing, PT/OT dorita Dumont RN      The Plan for Transition of Care is related to the following treatment goals: SNF    The Patient  was provided with a choice of provider and agrees   with the discharge plan. [x] Yes [] No    Freedom of choice list was provided with basic dialogue that supports the patient's individualized plan of care/goals, treatment preferences and shares the quality data associated with the providers.  [x] Yes [] No

## 2022-09-21 LAB
ANION GAP SERPL CALCULATED.3IONS-SCNC: 9 MMOL/L (ref 7–16)
BASOPHILS ABSOLUTE: 0 E9/L (ref 0–0.2)
BASOPHILS RELATIVE PERCENT: 0.6 % (ref 0–2)
BUN BLDV-MCNC: 13 MG/DL (ref 6–23)
BURR CELLS: ABNORMAL
CALCIUM SERPL-MCNC: 8.2 MG/DL (ref 8.6–10.2)
CHLORIDE BLD-SCNC: 99 MMOL/L (ref 98–107)
CO2: 21 MMOL/L (ref 22–29)
CREAT SERPL-MCNC: 0.7 MG/DL (ref 0.7–1.2)
EOSINOPHILS ABSOLUTE: 0.28 E9/L (ref 0.05–0.5)
EOSINOPHILS RELATIVE PERCENT: 1.8 % (ref 0–6)
GFR AFRICAN AMERICAN: >60
GFR NON-AFRICAN AMERICAN: >60 ML/MIN/1.73
GLUCOSE BLD-MCNC: 146 MG/DL (ref 74–99)
HCT VFR BLD CALC: 28.1 % (ref 37–54)
HEMOGLOBIN: 9.6 G/DL (ref 12.5–16.5)
LYMPHOCYTES ABSOLUTE: 1.11 E9/L (ref 1.5–4)
LYMPHOCYTES RELATIVE PERCENT: 7 % (ref 20–42)
MCH RBC QN AUTO: 29.4 PG (ref 26–35)
MCHC RBC AUTO-ENTMCNC: 34.2 % (ref 32–34.5)
MCV RBC AUTO: 85.9 FL (ref 80–99.9)
METAMYELOCYTES RELATIVE PERCENT: 0.9 % (ref 0–1)
METER GLUCOSE: 129 MG/DL (ref 74–99)
METER GLUCOSE: 133 MG/DL (ref 74–99)
METER GLUCOSE: 145 MG/DL (ref 74–99)
METER GLUCOSE: 147 MG/DL (ref 74–99)
METER GLUCOSE: 148 MG/DL (ref 74–99)
METER GLUCOSE: 177 MG/DL (ref 74–99)
METER GLUCOSE: 201 MG/DL (ref 74–99)
MONOCYTES ABSOLUTE: 0.79 E9/L (ref 0.1–0.95)
MONOCYTES RELATIVE PERCENT: 5.3 % (ref 2–12)
NEUTROPHILS ABSOLUTE: 13.59 E9/L (ref 1.8–7.3)
NEUTROPHILS RELATIVE PERCENT: 85.1 % (ref 43–80)
OVALOCYTES: ABNORMAL
PDW BLD-RTO: 14.9 FL (ref 11.5–15)
PLATELET # BLD: 485 E9/L (ref 130–450)
PMV BLD AUTO: 9.5 FL (ref 7–12)
POIKILOCYTES: ABNORMAL
POTASSIUM SERPL-SCNC: 3.8 MMOL/L (ref 3.5–5)
RBC # BLD: 3.27 E12/L (ref 3.8–5.8)
SODIUM BLD-SCNC: 129 MMOL/L (ref 132–146)
TARGET CELLS: ABNORMAL
WBC # BLD: 15.8 E9/L (ref 4.5–11.5)

## 2022-09-21 PROCEDURE — 99232 SBSQ HOSP IP/OBS MODERATE 35: CPT | Performed by: INTERNAL MEDICINE

## 2022-09-21 PROCEDURE — 82962 GLUCOSE BLOOD TEST: CPT

## 2022-09-21 PROCEDURE — 7100000001 HC PACU RECOVERY - ADDTL 15 MIN: Performed by: PODIATRIST

## 2022-09-21 PROCEDURE — 88311 DECALCIFY TISSUE: CPT

## 2022-09-21 PROCEDURE — 6370000000 HC RX 637 (ALT 250 FOR IP): Performed by: SPECIALIST

## 2022-09-21 PROCEDURE — 2500000003 HC RX 250 WO HCPCS: Performed by: PODIATRIST

## 2022-09-21 PROCEDURE — 2500000003 HC RX 250 WO HCPCS: Performed by: NURSE ANESTHETIST, CERTIFIED REGISTERED

## 2022-09-21 PROCEDURE — 6370000000 HC RX 637 (ALT 250 FOR IP): Performed by: INTERNAL MEDICINE

## 2022-09-21 PROCEDURE — 1200000000 HC SEMI PRIVATE

## 2022-09-21 PROCEDURE — 36415 COLL VENOUS BLD VENIPUNCTURE: CPT

## 2022-09-21 PROCEDURE — 3600000012 HC SURGERY LEVEL 2 ADDTL 15MIN: Performed by: PODIATRIST

## 2022-09-21 PROCEDURE — 0QBM0ZZ EXCISION OF LEFT TARSAL, OPEN APPROACH: ICD-10-PCS | Performed by: PODIATRIST

## 2022-09-21 PROCEDURE — 6370000000 HC RX 637 (ALT 250 FOR IP): Performed by: STUDENT IN AN ORGANIZED HEALTH CARE EDUCATION/TRAINING PROGRAM

## 2022-09-21 PROCEDURE — 6360000002 HC RX W HCPCS

## 2022-09-21 PROCEDURE — 3700000000 HC ANESTHESIA ATTENDED CARE: Performed by: PODIATRIST

## 2022-09-21 PROCEDURE — 7100000000 HC PACU RECOVERY - FIRST 15 MIN: Performed by: PODIATRIST

## 2022-09-21 PROCEDURE — 6360000002 HC RX W HCPCS: Performed by: NURSE ANESTHETIST, CERTIFIED REGISTERED

## 2022-09-21 PROCEDURE — 2709999900 HC NON-CHARGEABLE SUPPLY: Performed by: PODIATRIST

## 2022-09-21 PROCEDURE — 2580000003 HC RX 258: Performed by: SPECIALIST

## 2022-09-21 PROCEDURE — 3600000002 HC SURGERY LEVEL 2 BASE: Performed by: PODIATRIST

## 2022-09-21 PROCEDURE — 88304 TISSUE EXAM BY PATHOLOGIST: CPT

## 2022-09-21 PROCEDURE — 85025 COMPLETE CBC W/AUTO DIFF WBC: CPT

## 2022-09-21 PROCEDURE — 80048 BASIC METABOLIC PNL TOTAL CA: CPT

## 2022-09-21 PROCEDURE — 6360000002 HC RX W HCPCS: Performed by: SPECIALIST

## 2022-09-21 PROCEDURE — 6360000002 HC RX W HCPCS: Performed by: INTERNAL MEDICINE

## 2022-09-21 PROCEDURE — 2580000003 HC RX 258: Performed by: NURSE ANESTHETIST, CERTIFIED REGISTERED

## 2022-09-21 PROCEDURE — 3700000001 HC ADD 15 MINUTES (ANESTHESIA): Performed by: PODIATRIST

## 2022-09-21 DEVICE — INTEGRA® MESHED BILAYER WOUND MATRIX 2 IN*2 IN (5 CM*5 CM)
Type: IMPLANTABLE DEVICE | Site: HEEL | Status: FUNCTIONAL
Brand: INTEGRA®

## 2022-09-21 RX ORDER — MORPHINE SULFATE 2 MG/ML
2 INJECTION, SOLUTION INTRAMUSCULAR; INTRAVENOUS EVERY 5 MIN PRN
Status: DISCONTINUED | OUTPATIENT
Start: 2022-09-21 | End: 2022-09-21 | Stop reason: HOSPADM

## 2022-09-21 RX ORDER — SODIUM CHLORIDE 9 MG/ML
INJECTION, SOLUTION INTRAVENOUS CONTINUOUS PRN
Status: DISCONTINUED | OUTPATIENT
Start: 2022-09-21 | End: 2022-09-21 | Stop reason: SDUPTHER

## 2022-09-21 RX ORDER — DIPHENHYDRAMINE HYDROCHLORIDE 50 MG/ML
12.5 INJECTION INTRAMUSCULAR; INTRAVENOUS
Status: DISCONTINUED | OUTPATIENT
Start: 2022-09-21 | End: 2022-09-21 | Stop reason: HOSPADM

## 2022-09-21 RX ORDER — HYDROCODONE BITARTRATE AND ACETAMINOPHEN 5; 325 MG/1; MG/1
2 TABLET ORAL EVERY 4 HOURS PRN
Status: DISCONTINUED | OUTPATIENT
Start: 2022-09-21 | End: 2022-09-26 | Stop reason: HOSPADM

## 2022-09-21 RX ORDER — BUPIVACAINE HYDROCHLORIDE 5 MG/ML
INJECTION, SOLUTION EPIDURAL; INTRACAUDAL PRN
Status: DISCONTINUED | OUTPATIENT
Start: 2022-09-21 | End: 2022-09-21 | Stop reason: ALTCHOICE

## 2022-09-21 RX ORDER — LABETALOL HYDROCHLORIDE 5 MG/ML
10 INJECTION, SOLUTION INTRAVENOUS
Status: DISCONTINUED | OUTPATIENT
Start: 2022-09-21 | End: 2022-09-21 | Stop reason: HOSPADM

## 2022-09-21 RX ORDER — ONDANSETRON 2 MG/ML
4 INJECTION INTRAMUSCULAR; INTRAVENOUS
Status: DISCONTINUED | OUTPATIENT
Start: 2022-09-21 | End: 2022-09-21 | Stop reason: HOSPADM

## 2022-09-21 RX ORDER — PROPOFOL 10 MG/ML
INJECTION, EMULSION INTRAVENOUS PRN
Status: DISCONTINUED | OUTPATIENT
Start: 2022-09-21 | End: 2022-09-21 | Stop reason: SDUPTHER

## 2022-09-21 RX ORDER — MEPERIDINE HYDROCHLORIDE 25 MG/ML
12.5 INJECTION INTRAMUSCULAR; INTRAVENOUS; SUBCUTANEOUS EVERY 5 MIN PRN
Status: DISCONTINUED | OUTPATIENT
Start: 2022-09-21 | End: 2022-09-21 | Stop reason: HOSPADM

## 2022-09-21 RX ORDER — LIDOCAINE HYDROCHLORIDE 20 MG/ML
INJECTION, SOLUTION EPIDURAL; INFILTRATION; INTRACAUDAL; PERINEURAL PRN
Status: DISCONTINUED | OUTPATIENT
Start: 2022-09-21 | End: 2022-09-21 | Stop reason: SDUPTHER

## 2022-09-21 RX ORDER — MIDAZOLAM HYDROCHLORIDE 1 MG/ML
2 INJECTION INTRAMUSCULAR; INTRAVENOUS
Status: DISCONTINUED | OUTPATIENT
Start: 2022-09-21 | End: 2022-09-21 | Stop reason: HOSPADM

## 2022-09-21 RX ORDER — HYDRALAZINE HYDROCHLORIDE 20 MG/ML
10 INJECTION INTRAMUSCULAR; INTRAVENOUS
Status: DISCONTINUED | OUTPATIENT
Start: 2022-09-21 | End: 2022-09-21 | Stop reason: HOSPADM

## 2022-09-21 RX ORDER — HYDROCODONE BITARTRATE AND ACETAMINOPHEN 5; 325 MG/1; MG/1
1 TABLET ORAL EVERY 4 HOURS PRN
Status: DISCONTINUED | OUTPATIENT
Start: 2022-09-21 | End: 2022-09-26 | Stop reason: HOSPADM

## 2022-09-21 RX ORDER — PROPOFOL 10 MG/ML
INJECTION, EMULSION INTRAVENOUS CONTINUOUS PRN
Status: DISCONTINUED | OUTPATIENT
Start: 2022-09-21 | End: 2022-09-21 | Stop reason: SDUPTHER

## 2022-09-21 RX ORDER — MIDAZOLAM HYDROCHLORIDE 1 MG/ML
INJECTION INTRAMUSCULAR; INTRAVENOUS PRN
Status: DISCONTINUED | OUTPATIENT
Start: 2022-09-21 | End: 2022-09-21 | Stop reason: SDUPTHER

## 2022-09-21 RX ORDER — KETOROLAC TROMETHAMINE 30 MG/ML
30 INJECTION, SOLUTION INTRAMUSCULAR; INTRAVENOUS
Status: DISCONTINUED | OUTPATIENT
Start: 2022-09-21 | End: 2022-09-21 | Stop reason: HOSPADM

## 2022-09-21 RX ORDER — PROCHLORPERAZINE EDISYLATE 5 MG/ML
5 INJECTION INTRAMUSCULAR; INTRAVENOUS
Status: DISCONTINUED | OUTPATIENT
Start: 2022-09-21 | End: 2022-09-21 | Stop reason: HOSPADM

## 2022-09-21 RX ORDER — IPRATROPIUM BROMIDE AND ALBUTEROL SULFATE 2.5; .5 MG/3ML; MG/3ML
1 SOLUTION RESPIRATORY (INHALATION)
Status: DISCONTINUED | OUTPATIENT
Start: 2022-09-21 | End: 2022-09-21 | Stop reason: HOSPADM

## 2022-09-21 RX ORDER — FENTANYL CITRATE 50 UG/ML
INJECTION, SOLUTION INTRAMUSCULAR; INTRAVENOUS PRN
Status: DISCONTINUED | OUTPATIENT
Start: 2022-09-21 | End: 2022-09-21 | Stop reason: SDUPTHER

## 2022-09-21 RX ADMIN — HYDROCODONE BITARTRATE AND ACETAMINOPHEN 1 TABLET: 5; 325 TABLET ORAL at 22:15

## 2022-09-21 RX ADMIN — METRONIDAZOLE 500 MG: 500 TABLET ORAL at 22:14

## 2022-09-21 RX ADMIN — PROPOFOL INJECTABLE EMULSION 30 MG: 10 INJECTION, EMULSION INTRAVENOUS at 13:51

## 2022-09-21 RX ADMIN — INSULIN GLARGINE 5 UNITS: 100 INJECTION, SOLUTION SUBCUTANEOUS at 22:16

## 2022-09-21 RX ADMIN — Medication 9 MG: at 22:15

## 2022-09-21 RX ADMIN — PROPOFOL 100 MCG/KG/MIN: 10 INJECTION, EMULSION INTRAVENOUS at 13:51

## 2022-09-21 RX ADMIN — SODIUM CHLORIDE: 900 INJECTION, SOLUTION INTRAVENOUS at 13:11

## 2022-09-21 RX ADMIN — Medication 100 UNITS: at 10:26

## 2022-09-21 RX ADMIN — Medication 0.5 MG: at 15:05

## 2022-09-21 RX ADMIN — Medication 100 UNITS: at 22:23

## 2022-09-21 RX ADMIN — Medication 10 ML: at 10:17

## 2022-09-21 RX ADMIN — LIDOCAINE HYDROCHLORIDE 100 MG: 20 INJECTION, SOLUTION EPIDURAL; INFILTRATION; INTRACAUDAL; PERINEURAL at 13:51

## 2022-09-21 RX ADMIN — MIDAZOLAM 2 MG: 1 INJECTION INTRAMUSCULAR; INTRAVENOUS at 13:39

## 2022-09-21 RX ADMIN — HYDROMORPHONE HYDROCHLORIDE 0.5 MG: 1 INJECTION, SOLUTION INTRAMUSCULAR; INTRAVENOUS; SUBCUTANEOUS at 15:34

## 2022-09-21 RX ADMIN — WATER 2000 MG: 1 INJECTION INTRAMUSCULAR; INTRAVENOUS; SUBCUTANEOUS at 16:40

## 2022-09-21 RX ADMIN — ATORVASTATIN CALCIUM 40 MG: 40 TABLET, FILM COATED ORAL at 22:14

## 2022-09-21 RX ADMIN — FENTANYL CITRATE 50 MCG: 50 INJECTION, SOLUTION INTRAMUSCULAR; INTRAVENOUS at 13:46

## 2022-09-21 RX ADMIN — FENTANYL CITRATE 50 MCG: 50 INJECTION, SOLUTION INTRAMUSCULAR; INTRAVENOUS at 13:51

## 2022-09-21 RX ADMIN — GABAPENTIN 300 MG: 300 CAPSULE ORAL at 10:17

## 2022-09-21 RX ADMIN — GABAPENTIN 300 MG: 300 CAPSULE ORAL at 22:15

## 2022-09-21 RX ADMIN — HYDROCODONE BITARTRATE AND ACETAMINOPHEN 1 TABLET: 5; 325 TABLET ORAL at 18:13

## 2022-09-21 RX ADMIN — PROPOFOL INJECTABLE EMULSION 30 MG: 10 INJECTION, EMULSION INTRAVENOUS at 13:57

## 2022-09-21 RX ADMIN — Medication 10 ML: at 22:23

## 2022-09-21 RX ADMIN — CYCLOBENZAPRINE HYDROCHLORIDE 10 MG: 5 TABLET, FILM COATED ORAL at 22:15

## 2022-09-21 RX ADMIN — HYDROMORPHONE HYDROCHLORIDE 0.5 MG: 1 INJECTION, SOLUTION INTRAMUSCULAR; INTRAVENOUS; SUBCUTANEOUS at 15:05

## 2022-09-21 RX ADMIN — Medication 0.5 MG: at 15:34

## 2022-09-21 RX ADMIN — METRONIDAZOLE 500 MG: 500 TABLET ORAL at 05:32

## 2022-09-21 RX ADMIN — METRONIDAZOLE 500 MG: 500 TABLET ORAL at 16:39

## 2022-09-21 ASSESSMENT — PAIN DESCRIPTION - LOCATION
LOCATION: FOOT

## 2022-09-21 ASSESSMENT — PAIN SCALES - GENERAL
PAINLEVEL_OUTOF10: 8
PAINLEVEL_OUTOF10: 0
PAINLEVEL_OUTOF10: 0
PAINLEVEL_OUTOF10: 8
PAINLEVEL_OUTOF10: 0
PAINLEVEL_OUTOF10: 8
PAINLEVEL_OUTOF10: 0

## 2022-09-21 ASSESSMENT — PAIN DESCRIPTION - ORIENTATION
ORIENTATION: LEFT

## 2022-09-21 ASSESSMENT — PAIN DESCRIPTION - DESCRIPTORS
DESCRIPTORS: DISCOMFORT;SORE;ACHING
DESCRIPTORS: SHARP

## 2022-09-21 ASSESSMENT — PAIN DESCRIPTION - ONSET: ONSET: GRADUAL

## 2022-09-21 NOTE — PROGRESS NOTES
[address] [place], [place] [address]ist   Progress Note    Admitting Date and Time: 9/13/2022 12:00 PM  Admit Dx: Lactic acidosis [E87.2]  Septicemia ([address] [A41.9]  Wound infection [T14. 8XXA, L08.9]  Wound cellulitis [L03.90]  Sepsis ([address] [A41.9]    Subjective:    9/18: Pt  sitting up in bed in no acute distress. States feels well today. For procedure in am. ? PICC line placement. Denies any new concerns at this time. 9/19: Pt somewhat frustrated that he was NPO most of the day and then procedure got cancelled. Per RN, urgery got postponed due to scheduling issues for OR time. He was placed on schedule for tomorrow. PICC line placed this morning by IV team.    9/20: Pt  is hopeful he can get surgery tomorrow. 9/21: Pt is sitting up in bed. He had surgery today and is having pain. He was asking for something for pain more than Tylenol.  After questioning, he had tolerated Vicodin in past.    Per RN: Something strong for pain other than Tyleonol?     heparin flush  100 Units IntraCATHeter BID    cefTRIAXone (ROCEPHIN) IV  2,000 mg IntraVENous Q24H    metroNIDAZOLE  500 mg Oral 3 times per day    sodium chloride flush  10 mL IntraVENous 2 times per day    insulin glargine  5 Units SubCUTAneous Nightly    gabapentin  300 mg Oral BID    [Held by provider] clopidogrel  75 mg Oral Daily    aspirin  81 mg Oral Daily    atorvastatin  40 mg Oral Nightly    cyclobenzaprine  10 mg Oral Nightly    insulin lispro  0-4 Units SubCUTAneous Q4H    enoxaparin  40 mg SubCUTAneous Daily     sodium chloride flush, 10 mL, PRN  sodium chloride, 25 mL, PRN  povidone-iodine, , PRN  calcium carbonate, 500 mg, TID PRN  melatonin, 9 mg, Nightly PRN  diphenhydrAMINE, 25 mg, Q6H PRN  glucose, 4 tablet, PRN  dextrose bolus, 125 mL, PRN   Or  dextrose bolus, 250 mL, PRN  glucagon (rDNA), 1 mg, PRN  dextrose, , Continuous PRN  sodium chloride, , PRN  promethazine, 12.5 mg, Q6H PRN   Or  ondansetron, 4 mg, Q6H PRN  polyethylene glycol, 17 g, be considered for better assessment. XR FOOT LEFT (2 VIEWS)   Final Result   Marked soft tissue swelling in the dorsal aspect of the mid and distal foot. Slight interval improvement of soft tissue ulceration in the heel and   cortical irregularity along the posterior margin of the calcaneus. Question hairline fracture, proximal phalanx of 5th toe. Please correlate   with focal tenderness. XR CHEST 1 VIEW   Final Result   No acute process. There are multiple old left-sided rib fractures. Assessment:  Principal Problem:    Sepsis due to group A beta-hemolytic Streptococcus ([address]  Active Problems:    Diabetes mellitus ([address]    Osteomyelitis of left lower extremity (HCC)    Alcohol abuse    Coronary artery disease involving native coronary artery of native heart without angina pectoris    Sepsis due to cellulitis (HCC)    Lactic acidosis    Uncontrolled type 2 diabetes mellitus with hyperglycemia (HCC)    Type 2 diabetes mellitus with left diabetic foot ulcer ([address]    Bacteremia due to Gram-positive bacteria    Hyponatremia    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 2 diabetes mellitus, with necrosis of bone ([address]  Resolved Problems:    * No resolved hospital problems. *      Plan:                Sepsis due to cellulitis, left calcaneal osteomyelitis as well as Strep pyogenes bacteremia in patient with left diabetic foot ulcer  -Sepsis evidenced by leukocytosis (33.6), tachycardia (102) and cellulitis of the foot  -Podiatry and ID following  -Status post incision and drainage of left calcaneus and posterior left leg on 9/14. Underwent partial calcanectomy with debridement today 9/21 by podiatry. [place] pain panel added for pain control.  -Blood cultures with strep pyogenes, wound cultures with strep pyogenes and staph aureus. Antibiotics narrowed to ceftriaxone 2 g IV daily and metronidazole 500 mg p.o. every 8 hours per ID; day #5.  Prior to this completed 4 days of cefepime and 3 days of Vanco.  -Discussed that he will need long term IV antibiotics. Repeat blood cultures show no growth. -SL PICC line placed 9/19 R brachial vein. 2.         Lactic acidosis(resolved)  -Due to the above but resolved with IV fluid hydration  -Lactic acid trend as follows 4.3-->4.1- >1.9     3. Type II diabetes mellitus  -Added Lantus 5 units nightly in addition to corrective scale with improved glycemic control      4. Hypertension/CAD  -Hydrochlorothiazide on hold given hyponatremia  -Holding Plavix in anticipation of surgery 9/21     5. Hyponatremia  -likely component of hypovolemia due to sepsis, however he has had this in the past due to alcohol abuse. States he has been drinking 6-8 beers a day lately  -Now that he has been volume resuscitated from sepsis standpoint, the fluids have been discontinued  -Sodium is correcting at an appropriate rate; remains 129 this morning. 6.       Alcohol abuse  -6-8 beers per day  -counseled on cessation   -no signs of withdrawal syndrome at this time, but will have low threshold for initiating CIWA protocol but appears to be out of window for this. Disposition: Patient is now agreeable to SNF or LTAC. At least several more days in the hospital for surgery on 9/21, postoperative care. He requested [employer]. NOTE: This report was transcribed using voice recognition software. Every effort was made to ensure accuracy; however, inadvertent computerized transcription errors may be present.      Electronically signed by Chelsea Escamilla MD on 9/21/2022 at 5:53 PM

## 2022-09-21 NOTE — BRIEF OP NOTE
Brief Postoperative Note      Patient: Yung Patel  YOB: 1960  MRN: [id number]    Date of Procedure: 9/21/2022    Pre-Op Diagnosis: Acute osteomyelitis of left foot ([address] [M86.172]    Post-Op Diagnosis: Same       Procedure(s):  LEFT FOOT PARTIAL CALCANECTOMY AND DEBRIDEMENT    Surgeon(s):  Rober Padilla DPM    Assistant:  Resident: Rogelio Romero DPM    Anesthesia: Monitor Anesthesia Care    Estimated Blood Loss (mL): Minimal    Complications: None    Specimens:   ID Type Source Tests Collected by Time Destination   A : Calcaneal bone left foot Bone Bone SURGICAL PATHOLOGY Douglas Henderson DPM 9/21/2022 1408        Implants:  Implant Name Type Inv. Item Serial No.  Lot No. LRB No. Used Action   DRESSING BIO [id number] BOV TEND CLLGN GLYCOSAMINOGLYCAN - [id number]  DRESSING BIO [id number] BOV TEND CLLGN GLYCOSAMINOGLYCAN  [employer]- [id number] Left 1 Implanted         Drains:   [REMOVED] Urinary Catheter 09/14/22 Cole (Removed)   Site Assessment Swelling 09/15/22 2133   Urine Color Yellow 09/15/22 2133   Urine Appearance Clear 09/15/22 2133   Collection Container Standard 09/15/22 2133   Catheter Best Practices  Drainage tube clipped to bed;Catheter secured to thigh; Bag below bladder;Bag not on floor; Lack of dependent loop in tubing;Drainage bag less than half full 09/15/22 2133   Status Draining 09/15/22 2133   Output (mL) 400 mL 09/15/22 2007       Findings: Necrosis of posterior tuber of calcaneaum. Excellent closure and coverage.     Electronically signed by Douglas Henderson DPM on 9/21/2022 at 2:31 PM

## 2022-09-21 NOTE — ANESTHESIA POSTPROCEDURE EVALUATION
Department of Anesthesiology  Postprocedure Note    Patient: Rivka Qursehi  MRN: [id number]  YOB: 1960  Date of evaluation: 9/21/2022      Procedure Summary     Date: 09/21/22 Room / Location: [address] [place] [address] / [facility]    Anesthesia Start: 2230 Anesthesia Stop: 1442    Procedure: LEFT FOOT PARTIAL CALCANECTOMY AND DEBRIDEMENT (Left: Foot) Diagnosis:       Acute osteomyelitis of left foot ([address]      (Acute osteomyelitis of left foot ([address] [W63.703])    Surgeons: Delaney Gabriel DPM Responsible Provider: Bisi Giles MD    Anesthesia Type: MAC ASA Status: 4          Anesthesia Type: No value filed.     Stephen Phase I: Stephen Score: 8    Stephen Phase II:        Anesthesia Post Evaluation    Patient location during evaluation: PACU  Patient participation: complete - patient participated  Level of consciousness: awake  Airway patency: patent  Nausea & Vomiting: no nausea and no vomiting  Complications: no  Cardiovascular status: hemodynamically stable  Respiratory status: acceptable  Hydration status: euvolemic

## 2022-09-21 NOTE — PROGRESS NOTES
NAME: Brain Higgins  MR:  [id number]  :   1960  DATE OF SERVICE:22    This is a face to face encounter with Brain Higgins 60 y.o. male on 22  Elements of this note, including Diagnosis,  Interval History, Past Medical/Surgical/Family/Social Histories, ROS, physical exam, and Assessment and Plan were copied and pasted from Previous. Updates have been made where noted and reflect current exam and medical decision making from the DOS of this encounter. CHIEF COMPLAINT     ID following for   Chief Complaint   Patient presents with    Fatigue     Onset several days-hx left foot diabetic wound     HISTORY OF PRESENT ILLNESS     Pt seen and examined in postop/recovery  22  Lethargic but arousable nad      Afebrile on RA    Wbc15.8  cr0.7    tissue cx anaerobic gnr/gpc    foor cx MRSA/strep mixed GNR   blood cx BHS group A /S pyogenes    Patient is tolerating medications. No reported adverse drug reactions. REVIEW OF SYSTEMS     As stated above in the chief complaint, otherwise negative.   CURRENT MEDICATIONS     Current Facility-Administered Medications:     heparin flush 100 UNIT/ML injection 100 Units, 100 Units, IntraCATHeter, BID, Dora Gudino MD, 100 Units at 22 1026    cefTRIAXone (ROCEPHIN) 2,000 mg in sterile water 20 mL IV syringe, 2,000 mg, IntraVENous, Q24H, Todd Kitchen MD, 2,000 mg at 22 1524    metroNIDAZOLE (FLAGYL) tablet 500 mg, 500 mg, Oral, 3 times per day, Todd Kitchen MD, 500 mg at 22 0532    sodium chloride flush 0.9 % injection 10 mL, 10 mL, IntraVENous, 2 times per day, Todd Kitchen MD, 10 mL at 22 1017    sodium chloride flush 0.9 % injection 10 mL, 10 mL, IntraVENous, PRN, Todd Kitchen MD    0.9 % sodium chloride infusion, 25 mL, IntraVENous, PRN, Todd Kitchen MD    insulin glargine (LANTUS) injection vial 5 Units, 5 Units, SubCUTAneous, Nightly, Lamont Wells DO, 5 Units at 22 Oral, Q6H PRN **OR** acetaminophen (TYLENOL) suppository 650 mg, 650 mg, Rectal, Q6H PRN, Yeison Flanagan DPM  PHYSICAL EXAM     BP (!) 147/72   Pulse 69   Temp 97.6 °F (36.4 °C) (Temporal)   Resp 16   Ht 5' 11\" (1.803 m)   Wt 192 lb (87.1 kg)   SpO2 93%   BMI 26.78 kg/m²   Temp  Av.8 °F (36.6 °C)  Min: 97.4 °F (36.3 °C)  Max: 98.2 °F (36.8 °C)  Constitutional:  The patient is awake, alert  nad   Skin:    Warm and dry. No rash   HEENT:     AT/NC  Chest:   CTA ant  On RA  Cardiovascular:  S1 and S2 are rhythmic and regular. Abdomen:    Benign to palpation. DISTENDED  Extremities:   No edema. LEFT LE ACED postop   CNS    Awake nad         Peripherally Inserted Central Catheter:  PICC Single Lumen 22 Right Brachial (Active)   Central Line Being Utilized Yes 22 0932   Criteria for Appropriate Use Long term IV/antibiotic administration 22 0932   Site Assessment Clean;Dry; Intact 22 1036   Phlebitis Assessment No symptoms 22 0932   Infiltration Assessment 0 22 0932   Lumen Color/Status Pink;Flushed 22 1036   Extremity Circumference (cm) 32 cm 22 0932   Alcohol Cap Used Yes 22 0932   Date of Last Dressing Change 22 0932   Dressing Type Transparent; Antimicrobial;Securing device 22 1036   Dressing Status Clean, dry & intact 22 1036        DIAGNOSTIC RESULTS   Radiology:    Recent Labs     22  0650 22  0554 22  0525   WBC 12.6* 14.7* 15.8*   RBC 3.41* 3.27* 3.27*   HGB 9.8* 9.5* 9.6*   HCT 29.1* 27.6* 28.1*   MCV 85.3 84.4 85.9   MCH 28.7 29.1 29.4   MCHC 33.7 34.4 34.2   RDW 14.1 14.2 14.9    448 485*   MPV 9.1 9.2 9.5       Recent Labs     22  0650 22  0554 22  0525   * 129* 129*   K 3.9 3.7 3.8    101 99   CO2 18* 19* 21*   BUN 15 15 13   CREATININE 0.6* 0.7 0.7   GLUCOSE 130* 138* 146*   PROT 6.6 6.8  --    LABALBU 2.3* 2.5*  --    CALCIUM 8.6 8.2* 8.2*   BILITOT 0.5 0.4 --    ALKPHOS 95 102  --    AST 25 25  --    ALT 19 18  --        Lab Results   Component Value Date    CRP 24.9 (H) 09/13/2022    CRP 2.3 (H) 02/14/2021     Lab Results   Component Value Date    SEDRATE 90 (H) 09/13/2022    SEDRATE 87 (H) 02/14/2021     Recent Labs     09/19/22  0650 09/20/22  0554   AST 25 25   ALT 19 18       Lab Results   Component Value Date/Time    CHOL 164 03/01/2016 08:10 AM    TRIG 170 03/01/2016 08:10 AM    HDL 43 03/01/2016 08:10 AM    LDLCALC 87 03/01/2016 08:10 AM    LABVLDL 34 03/01/2016 08:10 AM     Lab Results   Component Value Date/Time    VITD25 12 (L) 12/28/2020 09:15 AM       Microbiology:   Recent Labs     09/19/22  1315   COVID19 Not Detected       Lab Results   Component Value Date/Time    BC 5 Days no growth 09/15/2022 07:24 AM    BC 5 Days no growth 02/16/2021 04:37 PM    BC Previously positive blood culture called 02/14/2021 07:07 PM    BC  02/14/2021 07:07 PM     Refer to previous culture for susceptibility results  of CXBL2 (LAC) collected 02/14/2021 at 19:07      BLOODCULT2 5 Days no growth 09/15/2022 07:25 AM    BLOODCULT2 Previously positive blood culture called 09/13/2022 02:59 PM    BLOODCULT2  09/13/2022 02:59 PM     Refer to previous culture CXBL 9/13/2022 1459 for susceptibility results    ORG Anaerobic gram negative justo 09/14/2022 04:41 PM    ORG Anaerobic gram positive cocci 09/14/2022 04:41 PM    ORG BHS Group A (Strep pyogenes) 09/13/2022 02:59 PM    ORG BHS Group A (Strep pyogenes) 09/13/2022 02:59 PM    ORG Staphylococcus aureus 09/13/2022 02:59 PM    ORG Strep pyogenes (Beta Strep Group A) 09/13/2022 02:59 PM     WOUND/ABSCESS   Date Value Ref Range Status   09/13/2022 (A)  Final    Mixed laura isolated. Further workup and sensitivity testing  is not routinely indicated and will not be performed.   Mixed laura isolated includes:  Mixed Gram negative rods  Gram positive rods catalase negative     09/13/2022   Final    Heavy growth  This isolate is presumed to be resistant based on the  detection of inducible Clindamycin resistance. Clindamycin  may still be effective in some patients. 09/13/2022 Heavy growth  Final     Smear, Respiratory   Date Value Ref Range Status   01/01/2021   Final    Group 6: <25 PMN's/LPF and <25 Epithelial cells/LPF  Rare Polymorphonuclear leukocytes  Epithelial cells not seen  No organisms seen           Component Value Date/Time    AFBCX No growth after 6 weeks of incubation. 02/16/2021 0732    AFBCX No growth after 6 weeks of incubation. 02/16/2021 0730    FUNGSM No Fungal elements seen 09/14/2022 1641    LABFUNG No growth after 4 weeks of incubation. 02/16/2021 0732    LABFUNG No growth after 4 weeks of incubation. 02/16/2021 0730       MRSA Culture Only   Date Value Ref Range Status   12/26/2020 Methicillin resistant Staph aureus not isolated  Final     CULTURE, RESPIRATORY   Date Value Ref Range Status   01/01/2021 Oral Pharyngeal Hannah absent  Growth not present    Final        FINAL IMPRESSION    Pt had   Chief Complaint   Patient presents with    Fatigue     Onset several days-hx left foot diabetic wound    Admitted for Lactic acidosis [E87.2]  HAS SEPSIS  -Streptococcus species SEPTICEMIA 9/13  Foot cx MSSA/Strep   -LEUKOCYTOSIS trending down   -LEFT LE CELLULITIS  -LEFT HEEL OM   Leukocytosis follow   9/14 1. Incision and drainage, left calcaneus bone. 2. Incision and drainage posterior left leg.     9/21 LEFT FOOT PARTIAL CALCANECTOMY AND DEBRIDEMENT    cefTRIAXone (ROCEPHIN) 2,000 mg in sterile water 20 mL IV syringe, Q24H  metroNIDAZOLE (FLAGYL) tablet 500 mg, 3 times per day        Picc in  F/u cbc        Imaging and labs were reviewed per medical records. T  Thank you for involving me in the care of Bruce Arce I will continue to follow. Please do not hesitate to call for any questions or concerns.     Electronically signed by Taylor Feliz MD on 9/21/2022 at 4:35 PM

## 2022-09-21 NOTE — CARE COORDINATION
9/21/2022 0951 CM note: Negative covid 9/19/22. Pt with positive blood cx and acute osteomyelitis left calcaneus. He is for podiatric intervention today and per ID will need IV Rocephin x 6 weeks. PICC line in place. Pt agreeable for SNF and prefers [employer] [facility] [place]. Per  liaison Lisa Barbosa, will review for acceptance after surgery to check wound care/abx costs. Pt gave [facility] at [facility] as second choice in [address] can't accept-referral placed to [address] [place] [address] to review. (Per [facility]-pt does not have LTACH criteriaat this time). For SNF WILL NEED PRECERT, TAINA, signed KELLY , covid testing, PT/OT dorita Dumont RN

## 2022-09-21 NOTE — PLAN OF CARE
Problem: Skin/Tissue Integrity  Goal: Absence of new skin breakdown  Description: 1. Monitor for areas of redness and/or skin breakdown  2. Assess vascular access sites hourly  3. Every 4-6 hours minimum:  Change oxygen saturation probe site  4. Every 4-6 hours:  If on nasal continuous positive airway pressure, respiratory therapy assess nares and determine need for appliance change or resting period.   9/21/2022 1352 by Dru Lundberg RN  Outcome: Progressing     Problem: Safety - Adult  Goal: Free from fall injury  9/21/2022 1352 by Dru Lundberg RN  Outcome: Progressing     Problem: ABCDS Injury Assessment  Goal: Absence of physical injury  9/21/2022 1352 by Dru Lundberg RN  Outcome: Progressing

## 2022-09-22 LAB
METER GLUCOSE: 121 MG/DL (ref 74–99)
METER GLUCOSE: 126 MG/DL (ref 74–99)
METER GLUCOSE: 133 MG/DL (ref 74–99)
METER GLUCOSE: 147 MG/DL (ref 74–99)
METER GLUCOSE: 147 MG/DL (ref 74–99)

## 2022-09-22 PROCEDURE — 6370000000 HC RX 637 (ALT 250 FOR IP): Performed by: INTERNAL MEDICINE

## 2022-09-22 PROCEDURE — 6370000000 HC RX 637 (ALT 250 FOR IP): Performed by: STUDENT IN AN ORGANIZED HEALTH CARE EDUCATION/TRAINING PROGRAM

## 2022-09-22 PROCEDURE — 6360000002 HC RX W HCPCS: Performed by: INTERNAL MEDICINE

## 2022-09-22 PROCEDURE — 2580000003 HC RX 258: Performed by: SPECIALIST

## 2022-09-22 PROCEDURE — 99232 SBSQ HOSP IP/OBS MODERATE 35: CPT | Performed by: INTERNAL MEDICINE

## 2022-09-22 PROCEDURE — 97165 OT EVAL LOW COMPLEX 30 MIN: CPT

## 2022-09-22 PROCEDURE — 6360000002 HC RX W HCPCS: Performed by: SPECIALIST

## 2022-09-22 PROCEDURE — 1200000000 HC SEMI PRIVATE

## 2022-09-22 PROCEDURE — 6360000002 HC RX W HCPCS: Performed by: STUDENT IN AN ORGANIZED HEALTH CARE EDUCATION/TRAINING PROGRAM

## 2022-09-22 PROCEDURE — 6370000000 HC RX 637 (ALT 250 FOR IP): Performed by: SPECIALIST

## 2022-09-22 PROCEDURE — 97530 THERAPEUTIC ACTIVITIES: CPT | Performed by: PHYSICAL THERAPIST

## 2022-09-22 PROCEDURE — 97161 PT EVAL LOW COMPLEX 20 MIN: CPT | Performed by: PHYSICAL THERAPIST

## 2022-09-22 PROCEDURE — 82962 GLUCOSE BLOOD TEST: CPT

## 2022-09-22 PROCEDURE — 97530 THERAPEUTIC ACTIVITIES: CPT

## 2022-09-22 RX ADMIN — METRONIDAZOLE 500 MG: 500 TABLET ORAL at 05:40

## 2022-09-22 RX ADMIN — HYDROCODONE BITARTRATE AND ACETAMINOPHEN 2 TABLET: 5; 325 TABLET ORAL at 19:59

## 2022-09-22 RX ADMIN — HYDROCODONE BITARTRATE AND ACETAMINOPHEN 2 TABLET: 5; 325 TABLET ORAL at 10:46

## 2022-09-22 RX ADMIN — INSULIN GLARGINE 5 UNITS: 100 INJECTION, SOLUTION SUBCUTANEOUS at 21:35

## 2022-09-22 RX ADMIN — WATER 2000 MG: 1 INJECTION INTRAMUSCULAR; INTRAVENOUS; SUBCUTANEOUS at 13:50

## 2022-09-22 RX ADMIN — ASPIRIN 81 MG: 81 TABLET, COATED ORAL at 08:53

## 2022-09-22 RX ADMIN — Medication 10 ML: at 21:27

## 2022-09-22 RX ADMIN — CYCLOBENZAPRINE HYDROCHLORIDE 10 MG: 5 TABLET, FILM COATED ORAL at 21:25

## 2022-09-22 RX ADMIN — GABAPENTIN 300 MG: 300 CAPSULE ORAL at 21:25

## 2022-09-22 RX ADMIN — Medication 100 UNITS: at 21:34

## 2022-09-22 RX ADMIN — Medication 100 UNITS: at 08:53

## 2022-09-22 RX ADMIN — HYDROCODONE BITARTRATE AND ACETAMINOPHEN 2 TABLET: 5; 325 TABLET ORAL at 14:44

## 2022-09-22 RX ADMIN — Medication 10 ML: at 08:58

## 2022-09-22 RX ADMIN — HYDROCODONE BITARTRATE AND ACETAMINOPHEN 1 TABLET: 5; 325 TABLET ORAL at 06:33

## 2022-09-22 RX ADMIN — ATORVASTATIN CALCIUM 40 MG: 40 TABLET, FILM COATED ORAL at 21:26

## 2022-09-22 RX ADMIN — GABAPENTIN 300 MG: 300 CAPSULE ORAL at 08:53

## 2022-09-22 RX ADMIN — HYDROCODONE BITARTRATE AND ACETAMINOPHEN 2 TABLET: 5; 325 TABLET ORAL at 23:56

## 2022-09-22 RX ADMIN — ENOXAPARIN SODIUM 40 MG: 100 INJECTION SUBCUTANEOUS at 08:53

## 2022-09-22 RX ADMIN — METRONIDAZOLE 500 MG: 500 TABLET ORAL at 21:25

## 2022-09-22 RX ADMIN — METRONIDAZOLE 500 MG: 500 TABLET ORAL at 13:50

## 2022-09-22 ASSESSMENT — PAIN SCALES - GENERAL
PAINLEVEL_OUTOF10: 8
PAINLEVEL_OUTOF10: 8
PAINLEVEL_OUTOF10: 7
PAINLEVEL_OUTOF10: 9
PAINLEVEL_OUTOF10: 8
PAINLEVEL_OUTOF10: 7
PAINLEVEL_OUTOF10: 3
PAINLEVEL_OUTOF10: 7

## 2022-09-22 ASSESSMENT — PAIN DESCRIPTION - LOCATION
LOCATION: FOOT
LOCATION: FOOT
LOCATION: LEG
LOCATION: FOOT

## 2022-09-22 ASSESSMENT — PAIN DESCRIPTION - ORIENTATION
ORIENTATION: LEFT

## 2022-09-22 ASSESSMENT — PAIN DESCRIPTION - DESCRIPTORS
DESCRIPTORS: ACHING

## 2022-09-22 NOTE — OP NOTE
[address]                                OPERATIVE REPORT    PATIENT NAME: Kristal Rodríguez                   :        1960  MED REC NO:   [id number]                            ROOM:       0810  ACCOUNT NO:   [de-identified]                           ADMIT DATE: 2022  PROVIDER:     Savanah Garcia DPM    DATE OF PROCEDURE:  2022    SURGEON:  Savanah Garcia DPM    ASSISTANT:  Darien Damon. PREOPERATIVE DIAGNOSES:  1. Acute osteomyelitis, left calcaneus. 2.  Open wound, left foot. 3.  Ulceration, left heel, necrosis of muscle and bone. POSTOPERATIVE DIAGNOSES:  1. Acute osteomyelitis, left calcaneus. 2.  Open wound, left foot. 3.  Ulceration, left heel, necrosis of muscle and bone. PROCEDURES:  1. Partial excision of posterior calcaneus tuber. 2.  Sural artery adipomyofascial pedicle elevation and transfer. 3.  Delayed primary closure, left posterior foot and leg wounds. 4.  Application of skin substitute grafts, specifically bilayer graft. ANESTHESIA:  Monitored anesthesia care. INJECTABLES:  20 mL of 0.5% Marcaine plain. COMPLICATIONS:  None. ESTIMATED BLOOD LOSS:  Approximately 50 mL. SPECIMENS:  Left calcaneus bone. MATERIALS USED:  2 x 2 Integra Bilayer graft, skin staples, 2-0 Vicryl,  3-0 nylon suture. INTRAOPERATIVE FINDINGS:  Necrosis of posterior tuber of the calcaneal.   Excellent closure and coverage noted at this time. INDICATIONS:  This is a pleasant 19-year-old male, presents today for  repeat debridement of left lower extremity. I counseled the patient the  nature of the problem, proposed course of procedure, potential benefits,  risks, complications, and convalescence in detail. All questions were  answered to the patient's apparent satisfaction. No guarantees given as  to the outcome of procedure.     DESCRIPTION OF PROCEDURE:  Under mild patient was transferred  to PACU for further monitoring prior to readmission to the nursing  floor.         Gerald Peter DPM    D: 09/21/2022 14:36:26       T: 09/21/2022 22:03:37     JADE/GABINO  Job#: [id number]     Doc#: [id number]    CC:

## 2022-09-22 NOTE — PROGRESS NOTES
Department of Podiatry  Progress Note      SUBJECTIVE:  Mr. Caro Tapia was evaluated at bedside this morning S/P LLE I&D, Debridement (DOS: 9/14/22), S/p L partial calcanectomy with Integra graft application (DOS: 7/47). He admits to some pain overnight but it is currently controlled. Discussed OR findings and path results; patient is happy with current appearance of LE when he is able to see it with dressing change. No overnight events reported. Dressing to LLE is c/d/intact. OBJECTIVE:    Scheduled Meds:   heparin flush  100 Units IntraCATHeter BID    cefTRIAXone (ROCEPHIN) IV  2,000 mg IntraVENous Q24H    metroNIDAZOLE  500 mg Oral 3 times per day    sodium chloride flush  10 mL IntraVENous 2 times per day    insulin glargine  5 Units SubCUTAneous Nightly    gabapentin  300 mg Oral BID    [Held by provider] clopidogrel  75 mg Oral Daily    aspirin  81 mg Oral Daily    atorvastatin  40 mg Oral Nightly    cyclobenzaprine  10 mg Oral Nightly    insulin lispro  0-4 Units SubCUTAneous Q4H    enoxaparin  40 mg SubCUTAneous Daily     Continuous Infusions:   sodium chloride      dextrose      sodium chloride       PRN Meds:. HYDROcodone 5 mg - acetaminophen **OR** HYDROcodone 5 mg - acetaminophen, sodium chloride flush, sodium chloride, povidone-iodine, calcium carbonate, melatonin, diphenhydrAMINE, glucose, dextrose bolus **OR** dextrose bolus, glucagon (rDNA), dextrose, sodium chloride, promethazine **OR** ondansetron, polyethylene glycol, acetaminophen **OR** acetaminophen    Allergies   Allergen Reactions    Pcn [Penicillins] Anaphylaxis       /71   Pulse 78   Temp 99 °F (37.2 °C) (Oral)   Resp 18   Ht 5' 11\" (1.803 m)   Wt 192 lb (87.1 kg)   SpO2 97%   BMI 26.78 kg/m²               EXAM:  LLE focused exam  NEUROLOGICAL EXAMINATION: Protective sensation absent. Gross sensation diminished. VASCULAR EXAMINATION:  DP and PT pulses are palpable. Capillary refill is WNL.   Skin temperature is warm to warm from tibial tuberosity to distal digits. No hair growth present to LE. Mild bleeding with dressing change but within normal limits of post-op day #1 appearance. Mild LLE edema, consistent with POD#1 appearance. DERMATOLOGICAL:  Sutures in place to incision along posterior leg and heel; staples in place with Integra graft well adhered. No evidence of gapping or dehiscence. No fluctuance, malodor, crepitus, or purulence noted. Mild sanguinous drainage to removed dressings. Skin is diffusely dry. MUSCULOSKELETAL:  MMT Deferred. Equinus. No posterior calf pain on palpation. Evidence of partial calcanectomy. Scheduled Meds:   heparin flush  100 Units IntraCATHeter BID    cefTRIAXone (ROCEPHIN) IV  2,000 mg IntraVENous Q24H    metroNIDAZOLE  500 mg Oral 3 times per day    sodium chloride flush  10 mL IntraVENous 2 times per day    insulin glargine  5 Units SubCUTAneous Nightly    gabapentin  300 mg Oral BID    [Held by provider] clopidogrel  75 mg Oral Daily    aspirin  81 mg Oral Daily    atorvastatin  40 mg Oral Nightly    cyclobenzaprine  10 mg Oral Nightly    insulin lispro  0-4 Units SubCUTAneous Q4H    enoxaparin  40 mg SubCUTAneous Daily     Continuous Infusions:   sodium chloride      dextrose      sodium chloride       PRN Meds:. HYDROcodone 5 mg - acetaminophen **OR** HYDROcodone 5 mg - acetaminophen, sodium chloride flush, sodium chloride, povidone-iodine, calcium carbonate, melatonin, diphenhydrAMINE, glucose, dextrose bolus **OR** dextrose bolus, glucagon (rDNA), dextrose, sodium chloride, promethazine **OR** ondansetron, polyethylene glycol, acetaminophen **OR** acetaminophen    RADIOLOGY:  VL LOWER EXTREMITY ARTERIAL SEGMENTAL PRESSURES W PPG BILATERAL   Final Result   In spite of normal bilateral ankle-brachial indices, Doppler and PVR   waveforms suggest at least mild, symmetric bilateral lower extremity arterial   stenosis.   Correlation with CTA of the abdomen/pelvis with lower Positive osteomyelitis posterior calcaneal tuber with marked soft tissue swelling dorsal aspect of mid and distal forefoot. Questionable hairline fracture proximal phalanx of digit.  - NWB to LLE  - Per CM, placement pending to [facility]. No tentative date at this time.  - No further plans for surgical intervention from podiatry standpoint. Will continue to follow for local wound care. Patient to f/u with Dr. Key Sebastian within 1 week of DC for continued post op care. D/W: Mariano Teran DPM FACFAS  Fellowship-Trained Foot and Ankle Surgeon  Diplomate, [employer] Foot [employer] Ankle Sur[employer]  [phone]     Thank you for involving podiatry in this patients care. Please do not hesitate to call with any questions or concerns.     Jesse Rutherford  9/22/2022   3:46 PM

## 2022-09-22 NOTE — PROGRESS NOTES
Physical Therapy Initial Evaluation/Plan of Care    Room #:  1229/9202-88  Patient Name: Darion Mcclelland  YOB: 1960  MRN: [id number]    Date of Service: 9/22/2022     Tentative placement recommendation: Subacute rehab  Equipment recommendation: To be determined      Evaluating Physical Therapist: Shivam Cardona PT #[id number]      Specific Provider Orders/Date/Referring Provider :  09/22/22 1200    PT eval and treat  Start:  09/22/22 1200,   End:  09/22/22 1200,   ONE TIME,   Standing Count:  1 Occurrences,   R       Comments:  NWB L leg    Jake Shah MD     Admitting Diagnosis:   Lactic acidosis [E87.2]  Septicemia ([address] [A41.9]  Wound infection [T14. 8XXA, L08.9]  Wound cellulitis [L03.90]  Sepsis ([address] [A41.9]      concern for wound infection  Surgery:   DATE OF PROCEDURE:  09/21/2022  SURGEON:  Karla Soares DPM  ASSISTANT:  Ina Yi. PROCEDURES:  1. Partial excision of posterior calcaneus tuber. 2.  Sural artery adipomyofascial pedicle elevation and transfer. 3.  Delayed primary closure, left posterior foot and leg wounds. 4.  Application of skin substitute grafts, specifically bilayer graft.     Visit Diagnoses         Codes    Septicemia ([address]    -  Primary A41.9    Wound cellulitis     L03.90    Lactic acidosis     E87.2    Acute hematogenous osteomyelitis of left foot (HCC)     M86.072    Acute osteomyelitis of left foot ([address]     M86.172            Patient Active Problem List   Diagnosis    CVA (cerebral vascular accident) ([address]    Diabetes mellitus ([address]    Hyponatremia    Diabetic foot infection ([address]    Hypertension    Hemiparesis affecting left side as late effect of cerebrovascular accident ([address]    Peripheral arterial disease ([address]    Urinary tract infection due to Proteus    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 2 diabetes mellitus, with necrosis of bone (HCC)    Ulcer of left heel, with necrosis of muscle (HCC)    Atherosclerosis of native artery of left lower extremity with ulceration of heel (HCC)    Peripheral vascular angioplasty status    Ulcer of left heel, with necrosis of bone (HCC)    PVD (peripheral vascular disease) ([facility]    Osteomyelitis of left lower extremity ([facility]    Alcohol abuse    Sepsis due to group A beta-hemolytic Streptococcus ([facility]    Coronary artery disease involving native coronary artery of native heart without angina pectoris    Sepsis due to cellulitis ([facility]    Lactic acidosis    Uncontrolled type 2 diabetes mellitus with hyperglycemia ([facility])    Type 2 diabetes mellitus with left diabetic foot ulcer ([facility]    Bacteremia due to Gram-positive bacteria        ASSESSMENT of Current Deficits Patient exhibits decreased strength, balance, and endurance impairing functional mobility, transfers, gait , gait distance, and tolerance to activity history of cva with Left upper extremity weakness, unable to maintain non weight bearing Left lower extremity in standing limiting functional mobility, may benefit from sliding board transfer until able to maintain non weight bearing status for sit to stand transfers. .Patient requires continued skilled physical therapy to address concerns listed above for increased safety and function at discharge.         PHYSICAL THERAPY  PLAN OF CARE       Physical therapy plan of care is established based on physician order,  patient diagnosis and clinical assessment    Current Treatment Recommendations:    -Bed Mobility: Lower extremity exercises  and Upper extremity exercises   -Sitting Balance: Incorporate reaching activities to activate trunk muscles , Facilitate active trunk muscle engagement , Facilitate postural control in all planes , and Engage in core activities to allow for movement within base of support   -Standing Balance: Perform sit to stand activities maintaining NWB (non-weight bearing) weightbearing left lower extremity   -Transfers: Provide instruction on proper hand and right lower extremity placement to performed by Gemini Pak DPM at [address]    TRANSESOPHAGEAL ECHOCARDIOGRAM N/A 2/22/2021    TRANSESOPHAGEAL ECHOCARDIOGRAM WITH BUBBLE STUDY performed by Balaji Carrillo MD at [facility] N/A 1/4/2021    EGD BIOPSY performed by Cece Osullivan DO at [facility]:    Precautions: Bedrest with bathroom Privileges , falls, alarm, and contact isolation ,  history CVA 2015. Strict Non weight bearing to Left leg    Social history: Patient lives with brother  has MS bedbound in basement has caregiver;  in a ranch home  with 3 steps  to enter to kitchen with Rail 3 steps to bed/bath, to basement 12 steps with railing  Tub shower grab bars has 2 nurses daily     Equipment owned: Wheelchair, [employer], Wheeled Walker, Bedside commode, and Shower chair,       [id number] [facility] Mobility Inpatient   How much difficulty turning over in bed?: A Little  How much difficulty sitting down on / standing up from a chair with arms?: A Lot  How much difficulty moving from lying on back to sitting on side of bed?: A Little  How much help from another person moving to and from a bed to a chair?: Total  How much help from another person needed to walk in hospital room?: Total  How much help from another person for climbing 3-5 steps with a railing?: Total  AM-PAC Inpatient Mobility Raw Score : 11  AM-PAC Inpatient T-Scale Score : 33.86  Mobility Inpatient CMS 0-100% Score: 72.57  Mobility Inpatient CMS G-Code Modifier : CL    Nursing cleared patient for PT evaluation. The admitting diagnosis and active problem list as listed above have been reviewed prior to the initiation of this evaluation. OBJECTIVE;   Initial Evaluation  Date: 9/22/2022 Treatment Date:     Short Term/ Long Term   Goals   Was pt agreeable to Eval/treatment?  Yes  To be met in 3 days   Pain level   0/10  neuropathy     Bed Mobility    Rolling: Minimal assist of 1    Supine to sit: Minimal assist of 1    Sit to supine: Moderate assist of 1    Scooting: Minimal assist of 1    Rolling: Independent    Supine to sit: Independent    Sit to supine: Independent    Scooting: Independent     Transfers Sit to stand: Moderate assist of 1 wheeled walker 1st rep, unable to maintain non weight bearing Left lower extremity immediately returned to siting; 2nd rep moderate assist of 2 to assist with walker control and stability, initially patient able to maintain non weight bearing Left lower extremity x 2-3 sec, however despite cueing unable to continue to maintain and immediately returned to sitting   Sit to stand:  Moderate assist of 1 non weight bearing Left lower extremity    ROM Within functional limits  except Left upper extremity and left ankle/foot  Increase range of motion 10% of affected joints    Strength BUE:  refer to OT eval  RLE:  4-/5  LLE:  3/5  Increase strength in affected mm groups by 1/3 grade   Balance Sitting EOB:  good -  Dynamic Standing:  poor wheeled walker   Sitting EOB:  good    Dynamic Standing: fair wheeled walker      Patient is Alert & Oriented x person, place, time, and situation and follows directions    Sensation:  Patient  reports numbness/tingling and neuropathy left lower extremity     Edema:  yes left lower extremity   Endurance: fair       Vitals: room air   Blood Pressure at rest  Blood Pressure during session    Heart Rate at rest   Heart Rate during session      SPO2 at rest 96%  SPO2 during session  %     Patient education  Patient educated on role of Physical Therapy, risks of immobility, safety and plan of care,  importance of mobility while in hospital , ankle pumps, quad set and glut set for edema control, blood clot prevention, weight bearing status , and positioning for skin integrity and comfort     Patient response to education:   Pt verbalized understanding Pt demonstrated skill Pt requires further education in this area   Yes Partial Yes      Treatment:  Patient practiced and was instructed/facilitated in the following treatment: Patient assisted to edge of bed,   Sat edge of bed 15 minutes with Minimal progressing to supervision to increase dynamic sitting balance and activity tolerance. Stood x 2 as above. Returned to bed. Therapeutic Exercises:  ankle pumps and quad sets  x 15 reps. At end of session, patient in bed with OT present call light and phone within reach,  all lines and tubes intact, nursing notified. Patient would benefit from continued skilled Physical Therapy to improve functional independence and quality of life. Patient's/ family goals   rehab    Time in  1328  Time out  1358    Total Treatment Time  10 minutes    Evaluation time includes thorough review of current medical information, gathering information on past medical history/social history and prior level of function, completion of standardized testing/informal observation of tasks, assessment of data, and development of Plan of care and goals.      CPT codes:  Low Complexity PT evaluation (71390)  Therapeutic activities (30765)   10 minutes  1 unit(s)    Waylon Cantrell, PT

## 2022-09-22 NOTE — PLAN OF CARE
Problem: Skin/Tissue Integrity  Goal: Absence of new skin breakdown  Description: 1. Monitor for areas of redness and/or skin breakdown  2. Assess vascular access sites hourly  3. Every 4-6 hours minimum:  Change oxygen saturation probe site  4. Every 4-6 hours:  If on nasal continuous positive airway pressure, respiratory therapy assess nares and determine need for appliance change or resting period.   9/22/2022 1000 by Linnette Hook RN  Outcome: Progressing     Problem: Safety - Adult  Goal: Free from fall injury  9/22/2022 1827 by Fiorella Long RN  Outcome: Progressing     Problem: ABCDS Injury Assessment  Goal: Absence of physical injury  Outcome: Progressing

## 2022-09-22 NOTE — DISCHARGE INSTR - COC
Continuity of Care Form    Patient Name: Tim Rodgers   :  1960  MRN:  [id number]    Admit date:  2022  Discharge date:  22    Code Status Order: Full Code   Advance Directives:   Advance Care Flowsheet Documentation       Date/Time Healthcare Directive Type of Healthcare Directive Copy in [address] Agent's Name Healthcare Agent's Phone Number    22 1200 No, patient does not have an advance directive for healthcare treatment -- -- -- -- --    22 1311 No, patient does not have an advance directive for healthcare treatment -- -- -- -- --            Admitting Physician:  Cleo Parson MD  PCP: Didier Morrow MD    Discharging Nurse: [facility] Unit/Room#: 4600/4053-63  Discharging Unit Phone Number: [phone]    Emergency Contact:   Extended Emergency Contact Information  Primary Emergency Contact: [address] [place] [address]  Phone: [phone]  Mobile Phone: [phone]  Relation: Brother/Sister   needed? No  Secondary Emergency Contact: [facility] Phone: [phone]  Mobile Phone: [phone]  Relation: Parent   needed?  No    Past Surgical History:  Past Surgical History:   Procedure Laterality Date    CARPAL TUNNEL RELEASE  10/01/2016    Dr. Wil Madrigal Left 2021    LEFT FOOT DEBRIDEMENT WITH BONE BIOPSY, WOUND VAC APPLICATION performed by Almaz Acosta DPM at [address] Left 2022    LEFT FOOT DEBRIDEMENT INCISION AND DRAINAGE performed by Almaz Acosta DPM at [address] Left 2022    LEFT FOOT PARTIAL CALCANECTOMY AND DEBRIDEMENT performed by Almaz Acosta DPM at [address]    TRANSESOPHAGEAL ECHOCARDIOGRAM N/A 2021    TRANSESOPHAGEAL ECHOCARDIOGRAM WITH BUBBLE STUDY performed by Sherron Dobbins MD at [address] [place] [address] 2021 EGD BIOPSY performed by Sage Suh DO at [facility] ENDOSCOPY       Immunization History:   Immunization History   Administered Date(s) Administered    COVID-19, J&J, (age 18y+), IM, 0.5 mL 04/01/2021       Active Problems:  Patient Active Problem List   Diagnosis Code    CVA (cerebral vascular accident) ([facility] I63.9    Diabetes mellitus ([facility] E11.9    Hyponatremia E87.1    Diabetic foot infection ([facility] E11.628, L08.9    Hypertension I10    Hemiparesis affecting left side as late effect of cerebrovascular accident [facility]) I69.354    Peripheral arterial disease ([facility] I73.9    Urinary tract infection due to Proteus N39.0, B96.4    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 2 diabetes mellitus, with necrosis of bone ([facility] E11.621, L97.424    Ulcer of left heel, with necrosis of muscle ([facility]) L97.423    Atherosclerosis of native artery of left lower extremity with ulceration of heel ([facility]) I70.244    Peripheral vascular angioplasty status Z98.62    Ulcer of left heel, with necrosis of bone ([facility]) L97.424    PVD (peripheral vascular disease) ([facility]) I73.9    Osteomyelitis of left lower extremity ([facility]) M86.9    Alcohol abuse F10.10    Sepsis due to group A beta-hemolytic Streptococcus ([facility]) A40.0    Coronary artery disease involving native coronary artery of native heart without angina pectoris I25.10    Sepsis due to cellulitis ([facility] L03.90, A41.9    Lactic acidosis E87.2    Uncontrolled type 2 diabetes mellitus with hyperglycemia ([facility]) E11.65    Type 2 diabetes mellitus with left diabetic foot ulcer ([facility]) E11.621, L97.529    Bacteremia due to Gram-positive bacteria R78.81       Isolation/Infection:   Isolation            Contact          Patient Infection Status       Infection Onset Added Last Indicated Last Indicated By Review Planned Expiration Resolved Resolved By    CRE (Carbapenem-Resistant Enterobacteriaceae)  07/28/22 07/28/22 Deborah Tomas RN        Acinetobacter baumannii wound 7-25-22, 8-30-22    MDRO (multi-drug resistant organism)  05/16/22 05/16/22 Sissy Kelly RN        Acinetobacter baumannii - left heel ulcer - 5/12/22,6/22/22    MRSA 05/10/21 05/12/21 05/12/22 Culture, Wound        Foot, wound/abscess, 5/10/2021, 6/3/2021  Left heel ulcer 5/12/22    Resolved    COVID-19 (Rule Out) 02/18/21 02/19/21 02/19/21 COVID-19 (Ordered)   02/21/21 Rule-Out Test Resulted    COVID-19 (Rule Out) 01/04/21 01/04/21 01/04/21 COVID-19 (Ordered)   01/04/21 Rule-Out Test Resulted    COVID-19 (Rule Out) 12/26/20 12/26/20 12/26/20 COVID-19 (Ordered)   12/26/20 Rule-Out Test Resulted            Nurse Assessment:  Last Vital Signs: /71   Pulse 78   Temp 99 °F (37.2 °C) (Oral)   Resp 18   Ht 5' 11\" (1.803 m)   Wt 192 lb (87.1 kg)   SpO2 97%   BMI 26.78 kg/m²     Last documented pain score (0-10 scale): Pain Level: 7  Last Weight:   Wt Readings from Last 1 Encounters:   09/14/22 192 lb (87.1 kg)     Mental Status:  oriented    IV Access:  - PICC - site  R Basilic, insertion date: 9/17    Nursing Mobility/ADLs:  Walking   Dependent  Transfer  Dependent  Bathing  Dependent  Dressing  Dependent  Toileting  Dependent  Feeding  Needs set up. Med Admin  Assisted  Med Delivery   whole    Wound Care Documentation and Therapy:  Wound 12/26/20 Arm Left (Active)   Number of days: 634       Wound 02/15/21 Heel Left (Active)   Number of days: 584       Wound 02/15/21 Heel Right (Active)   Number of days: 779       Wound 03/08/21 Heel Left #1 left heel aquired 12/1/20 (Active)   Wound Image   09/12/22 1331   Dressing Status Clean;Dry; Intact 09/22/22 0830   Wound Cleansed Cleansed with saline 09/12/22 1402   Dressing/Treatment Ace wrap 09/22/22 0830   Offloading for Diabetic Foot Ulcers Post op shoe 09/12/22 1402   Wound Length (cm) 5 cm 09/12/22 1331   Wound Width (cm) 3.1 cm 09/12/22 1331   Wound Depth (cm) 1.1 cm 09/12/22 1331   Wound Surface Area (cm^2) 15.5 cm^2 09/12/22 1331   Change in Wound Size % (l*w) -28.21 09/12/22 1331   Wound Volume (cm^3) 17.05 cm^3 09/12/22 1331   Wound Healing % -102 09/12/22 1331   Post-Procedure Length (cm) 5.1 cm 09/12/22 1402   Post-Procedure Width (cm) 3.2 cm 09/12/22 1402   Post-Procedure Depth (cm) 1.2 cm 09/12/22 1402   Post-Procedure Surface Area (cm^2) 16.32 cm^2 09/12/22 1402   Post-Procedure Volume (cm^3) 19.584 cm^3 09/12/22 1402   Wound Assessment Pink/red;Fibrin 09/12/22 1331   Drainage Amount Large 09/12/22 1331   Drainage Description Sanguinous 09/12/22 1331   Odor None 09/12/22 1331   Adrianne-wound Assessment Maceration 09/12/22 1331   Number of days: 608       Wound 03/08/21 Heel Right #2 rt heel aquired 12/1/20 (Active)   Number of days: 563       Incision 09/14/22 Foot Anterior;Right (Active)   Dressing Status Clean;Dry; Intact 09/22/22 0830   Dressing/Treatment Dry dressing;ABD pad 09/22/22 0830   Closure Other (Comment) 09/14/22 1725   Margins Other (Comment) 09/14/22 1725   Incision Assessment Other (Comment) 09/14/22 1725   Drainage Amount None 09/20/22 0900   Drainage Description Yellow 09/16/22 1045   Odor None 09/17/22 2015   Adrianne-incision Assessment Other (Comment) 09/14/22 1725   Number of days: 7       Incision 09/21/22 Leg Left;Posterior; Lower (Active)   Dressing Status Clean;Dry; Intact 09/21/22 1508   Dressing/Treatment Other (comment) 09/21/22 1508   Incision Assessment Dry 09/21/22 1508   Drainage Amount None 09/21/22 1508   Number of days: 0       Incision 02/19/21 Groin Right (Active)   Number of days: 580        Elimination:  Continence: Bowel: Yes  Bladder: Yes  Urinary Catheter: None   Colostomy/Ileostomy/Ileal Conduit: No       Date of Last BM: 9/26/22    Intake/Output Summary (Last 24 hours) at 9/22/2022 1338  Last data filed at 9/22/2022 0853  Gross per 24 hour   Intake 1620 ml   Output 950 ml   Net 670 ml     I/O last 3 completed shifts: In: 2046 [P.O.:870; I.V.:900]  Out: 2075 [Urine:2075]    Safety Concerns:      At Risk for Falls    Impairments/Disabilities:      None    Nutrition Therapy:  Current Nutrition Therapy:   - Oral Diet:  General    Routes of Feeding: Oral  Liquids: No Restrictions  Daily Fluid Restriction: no  Last Modified Barium Swallow with Video (Video Swallowing Test): not done    Treatments at the Time of Hospital Discharge:   Respiratory Treatments: none  Oxygen Therapy:  is not on home oxygen therapy. Ventilator:    - No ventilator support    Rehab Therapies: none  Weight Bearing Status/Restrictions: Non weight bearing lower left  Other Medical Equipment (for information only, NOT a DME order):  wheelchair  Other Treatments: Podiatry Wound Care Instructions for Discharge   - Change dressings to left lower extremity every other day with adaptic, 4x4 gauzes, kerlix, ace bandage in SLIGHTLY compressive manner.   Please be gentle on graft/flap   - Non-weightbearing to left lower extremity   - follow up with Dr. Arnulfo Petersen 1 week after discharge    Patient's personal belongings (please select all that are sent with patient):  None    RN SIGNATURE:  Electronically signed by Kisha Francis RN on 9/26/22 at 11:45 AM EDT    CASE MANAGEMENT/SOCIAL WORK SECTION    Inpatient Status Date: 9/13/2022    Readmission Risk Assessment Score:  Readmission Risk              Risk of Unplanned Readmission:  14           Discharging to Facility/ Agency   Name: [facility]  Address: [address]  Phone: [phone]  Fax:    Dialysis Facility (if applicable)   Name:  Address:  Dialysis Schedule:  Phone:  Fax:    / signature: Electronically signed by Baltazar Jenkins RN on 9/22/22 at 1:40 PM EDT    PHYSICIAN SECTION    Prognosis: Good    Condition at Discharge: Stable    Rehab Potential (if transferring to Rehab): Good    Recommended Labs or Other Treatments After Discharge:   PT/OT  NWB LLE  Wound care:   PICC line care  IV abx per ID protocol    Physician Certification: I

## 2022-09-22 NOTE — PROGRESS NOTES
NAME: Go Garcia  MR:  [id number]  :   1960  DATE OF SERVICE:22    This is a face to face encounter with Go Garcia 60 y.o. male on 22  Elements of this note, including Diagnosis,  Interval History, Past Medical/Surgical/Family/Social Histories, ROS, physical exam, and Assessment and Plan were copied and pasted from Previous. Updates have been made where noted and reflect current exam and medical decision making from the DOS of this encounter. CHIEF COMPLAINT     ID following for   Chief Complaint   Patient presents with    Fatigue     Onset several days-hx left foot diabetic wound     HISTORY OF PRESENT ILLNESS     Pt seen and examined   22  Asleep  but arousable nad   Had no questions or concerns   Tmax99   Afebrile on RA    Wbc15.8  cr0.7    tissue cx anaerobic gnr/gpc    foor cx MRSA/strep mixed GNR   blood cx BHS group A /S pyogenes    Patient is tolerating medications. No reported adverse drug reactions. REVIEW OF SYSTEMS     As stated above in the chief complaint, otherwise negative.   CURRENT MEDICATIONS     Current Facility-Administered Medications:     HYDROcodone-acetaminophen (NORCO) 5-325 MG per tablet 1 tablet, 1 tablet, Oral, Q4H PRN, 1 tablet at 22 0633 **OR** HYDROcodone-acetaminophen (NORCO) 5-325 MG per tablet 2 tablet, 2 tablet, Oral, Q4H PRN, Randy Fletcher MD    heparin flush 100 UNIT/ML injection 100 Units, 100 Units, IntraCATHeter, BID, Randy Fletcher MD, 100 Units at 22 0853    cefTRIAXone (ROCEPHIN) 2,000 mg in sterile water 20 mL IV syringe, 2,000 mg, IntraVENous, Q24H, Ramírez Blanco MD, 2,000 mg at 22 1640    metroNIDAZOLE (FLAGYL) tablet 500 mg, 500 mg, Oral, 3 times per day, Ramírez Blanco MD, 500 mg at 22 0540    sodium chloride flush 0.9 % injection 10 mL, 10 mL, IntraVENous, 2 times per day, Ramírez Blanco MD, 10 mL at 22 0858    sodium chloride flush 0.9 % injection 10 mL, 10 mL, IntraVENous, PRN, Temo Mejia MD    0.9 % sodium chloride infusion, 25 mL, IntraVENous, PRN, Temo Mejia MD    insulin glargine (LANTUS) injection vial 5 Units, 5 Units, SubCUTAneous, Nightly, Lamont Wells DO, 5 Units at 09/21/22 2216    povidone-iodine (BETADINE) 10 % external solution, , Topical, PRN, Aldair Leon MD    calcium carbonate (TUMS) chewable tablet 500 mg, 500 mg, Oral, TID PRN, Donavan Hamiltont, DO, 500 mg at 09/20/22 2046    gabapentin (NEURONTIN) capsule 300 mg, 300 mg, Oral, BID, Yeison Ahammad, DPM, 300 mg at 09/22/22 0853    [Held by provider] clopidogrel (PLAVIX) tablet 75 mg, 75 mg, Oral, Daily, Yeison Ahammad, DPM, 75 mg at 09/17/22 0848    aspirin EC tablet 81 mg, 81 mg, Oral, Daily, Yeison Ahammad, DPM, 81 mg at 09/22/22 0853    melatonin tablet 9 mg, 9 mg, Oral, Nightly PRN, Yeison Ahammad, DPM, 9 mg at 09/21/22 2215    diphenhydrAMINE (BENADRYL) tablet 25 mg, 25 mg, Oral, Q6H PRN, Yeison Ahammad, DPM, 25 mg at 09/17/22 2239    atorvastatin (LIPITOR) tablet 40 mg, 40 mg, Oral, Nightly, Yeison Ahammad, DPM, 40 mg at 09/21/22 2214    cyclobenzaprine (FLEXERIL) tablet 10 mg, 10 mg, Oral, Nightly, Yeison Ahammad, DPM, 10 mg at 09/21/22 2215    glucose chewable tablet 16 g, 4 tablet, Oral, PRN, Yeison Ahammad, DPM    dextrose bolus 10% 125 mL, 125 mL, IntraVENous, PRN **OR** dextrose bolus 10% 250 mL, 250 mL, IntraVENous, PRN, Yeison Ahammad, DPM    glucagon (rDNA) injection 1 mg, 1 mg, SubCUTAneous, PRN, Yeison Ahammad, DPM    dextrose 10 % infusion, , IntraVENous, Continuous PRN, Yeison Ahammad, DPM    insulin lispro (HUMALOG) injection vial 0-4 Units, 0-4 Units, SubCUTAneous, Q4H, Yeison Ahammad, DPM, 1 Units at 09/20/22 2052    0.9 % sodium chloride infusion, , IntraVENous, PRN, Yeison Ahammad, DPM    enoxaparin (LOVENOX) injection 40 mg, 40 mg, SubCUTAneous, Daily, Yeison Taylormmad, DPM, 40 mg at 09/22/22 0853    promethazine (PHENERGAN) tablet 12.5 mg, 12.5 mg, Oral, Q6H PRN **OR** ondansetron (ZOFRAN) injection 4 mg, 4 mg, IntraVENous, Q6H PRN, Yeison Ahammad, DPM, 4 mg at 09/15/22 1652    polyethylene glycol (GLYCOLAX) packet 17 g, 17 g, Oral, Daily PRN, Yeison Ahammad, DPM    acetaminophen (TYLENOL) tablet 650 mg, 650 mg, Oral, Q6H PRN **OR** acetaminophen (TYLENOL) suppository 650 mg, 650 mg, Rectal, Q6H PRN, Yeison Ahammad, DPM  PHYSICAL EXAM     /71   Pulse 78   Temp 99 °F (37.2 °C) (Oral)   Resp 18   Ht 5' 11\" (1.803 m)   Wt 192 lb (87.1 kg)   SpO2 97%   BMI 26.78 kg/m²   Temp  Av °F (36.7 °C)  Min: 97.4 °F (36.3 °C)  Max: 99 °F (37.2 °C)  Constitutional:  The patient is awake, alert  in bd  Skin:    Warm and dry. No rash   HEENT:     AT/NC  Chest:   CTA ant  On RA  Cardiovascular:  S1 and S2 are rhythmic and regular. Abdomen:    Benign to palpation. DISTENDED  Extremities:   No edema. LEFT LE ACED postop   CNS    Awake nad         Peripherally Inserted Central Catheter:  PICC Single Lumen 22 Right Brachial (Active)   Central Line Being Utilized Yes 22   Criteria for Appropriate Use Long term IV/antibiotic administration 22 0932   Site Assessment Clean;Dry; Intact 22 1036   Phlebitis Assessment No symptoms 22   Infiltration Assessment 0 22 0932   Lumen Color/Status Pink;Flushed 22 1036   Extremity Circumference (cm) 32 cm 22 0932   Alcohol Cap Used Yes 22 0932   Date of Last Dressing Change 22 09   Dressing Type Transparent; Antimicrobial;Securing device 22 1036   Dressing Status Clean, dry & intact 22 1036        DIAGNOSTIC RESULTS   Radiology:    Recent Labs     22  0554 22  0525   WBC 14.7* 15.8*   RBC 3.27* 3.27*   HGB 9.5* 9.6*   HCT 27.6* 28.1*   MCV 84.4 85.9   MCH 29.1 29.4   MCHC 34.4 34.2   RDW 14.2 14.9    485*   MPV 9.2 9.5       Recent Labs     22  0554 22  0525   * 129*   K 3.7 3.8    99   CO2 19* 21*   BUN 15 13 CREATININE 0.7 0.7   GLUCOSE 138* 146*   PROT 6.8  --    LABALBU 2.5*  --    CALCIUM 8.2* 8.2*   BILITOT 0.4  --    ALKPHOS 102  --    AST 25  --    ALT 18  --        Lab Results   Component Value Date    CRP 24.9 (H) 09/13/2022    CRP 2.3 (H) 02/14/2021     Lab Results   Component Value Date    SEDRATE 90 (H) 09/13/2022    SEDRATE 87 (H) 02/14/2021     Recent Labs     09/20/22  0554   AST 25   ALT 18       Lab Results   Component Value Date/Time    CHOL 164 03/01/2016 08:10 AM    TRIG 170 03/01/2016 08:10 AM    HDL 43 03/01/2016 08:10 AM    LDLCALC 87 03/01/2016 08:10 AM    LABVLDL 34 03/01/2016 08:10 AM     Lab Results   Component Value Date/Time    VITD25 12 (L) 12/28/2020 09:15 AM       Microbiology:   Recent Labs     09/19/22  1315   COVID19 Not Detected       Lab Results   Component Value Date/Time    BC 5 Days no growth 09/15/2022 07:24 AM    BC 5 Days no growth 02/16/2021 04:37 PM    BC Previously positive blood culture called 02/14/2021 07:07 PM    BC  02/14/2021 07:07 PM     Refer to previous culture for susceptibility results  of CXBL2 (LAC) collected 02/14/2021 at 19:07      BLOODCULT2 5 Days no growth 09/15/2022 07:25 AM    BLOODCULT2 Previously positive blood culture called 09/13/2022 02:59 PM    BLOODCULT2  09/13/2022 02:59 PM     Refer to previous culture CXBL 9/13/2022 1459 for susceptibility results    ORG Anaerobic gram negative justo 09/14/2022 04:41 PM    ORG Anaerobic gram positive cocci 09/14/2022 04:41 PM    ORG BHS Group A (Strep pyogenes) 09/13/2022 02:59 PM    ORG BHS Group A (Strep pyogenes) 09/13/2022 02:59 PM    ORG Staphylococcus aureus 09/13/2022 02:59 PM    ORG Strep pyogenes (Beta Strep Group A) 09/13/2022 02:59 PM     WOUND/ABSCESS   Date Value Ref Range Status   09/13/2022 (A)  Final    Mixed laura isolated. Further workup and sensitivity testing  is not routinely indicated and will not be performed.   Mixed laura isolated includes:  Mixed Gram negative rods  Gram positive rods signed by Nella Wang MD on 9/22/2022 at 9:20 AM

## 2022-09-22 NOTE — PROGRESS NOTES
[address] [facility]  [facility] [place]. [place]        Date:2022                                                  Patient Name: Esdras Giron    MRN: [id number]    : 1960    Room: [address] [place] [address]      Evaluating OT: Jean Claude Clark OTR/L #[id number]     Referring Provider and Specific Provider Orders/Date:      22 1200  OT eval and treat  Start:  22 1200,   End:  22 1200,   ONE TIME,   Standing Count:  1 Occurrences,   R       Comments:  NWB Magaly Bains MD       Placement Recommendation: Subacute Rehab        Diagnosis:   1. Septicemia ([address]    2. Wound cellulitis    3. Lactic acidosis    4. Acute hematogenous osteomyelitis of left foot ([address]    5. Acute osteomyelitis of left foot [facility])        Surgery:      DATE OF PROCEDURE:  2022     SURGEON:  Marcel Davis DPM    PROCEDURES:  1. Partial excision of posterior calcaneus tuber. 2.  Sural artery adipomyofascial pedicle elevation and transfer. 3.  Delayed primary closure, left posterior foot and leg wounds. 4.  Application of skin substitute grafts, specifically bilayer graft.      Pertinent Medical History:       Past Medical History:   Diagnosis Date    Arthritis     Bilateral carpal tunnel syndrome 2016    Cerebral artery occlusion with cerebral infarction [facility])     Chronic back pain     Coronary artery disease involving native coronary artery of native heart without angina pectoris 2022    CVA (cerebral vascular accident) ([address] 2015, 2017    Diabetes mellitus ([address]     Type 2    Diabetic foot ulcer with osteomyelitis ([address] 2021    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 2 diabetes mellitus, with necrosis of bone ([address] 2021    Hemiparesis affecting left side as late effect of cerebrovascular accident ([address]     LEFT SIDE NON DOMINANT FOLLOWING STROKE    Hyperlipidemia     Hypertension     Peripheral vascular angioplasty status 12/21/2021    Ulcer of left heel, with necrosis of bone [facility] 12/27/2021    Ulcer of left heel, with necrosis of muscle [facility] 12/21/2021    Vitamin D deficiency          Past Surgical History:   Procedure Laterality Date    CARPAL TUNNEL RELEASE  10/01/2016    Dr. Cyril Hammer Left 2/16/2021    LEFT FOOT DEBRIDEMENT WITH BONE BIOPSY, WOUND VAC APPLICATION performed by Nathan Lizarraga DPM at [address] [place] [address] Drive Left 9/14/2022    LEFT FOOT DEBRIDEMENT INCISION AND DRAINAGE performed by Nathan Lizarraga DPM at [address] [place] [address] Drive Left 9/21/2022    LEFT FOOT PARTIAL CALCANECTOMY AND DEBRIDEMENT performed by Nathan Lizarraga DPM at [address]    TRANSESOPHAGEAL ECHOCARDIOGRAM N/A 2/22/2021    TRANSESOPHAGEAL ECHOCARDIOGRAM WITH BUBBLE STUDY performed by Lee Robbins MD at [place] [address] N/A 1/4/2021    EGD BIOPSY performed by Cathi Lowe DO at [facility] ENDOSCOPY      Precautions:  Fall Risk, alarm, strict non-weight bearing left lower extremity, history of CVA with residual left hemiparesis       Assessment of current deficits    [x] Functional mobility  [x]ADLs  [x] Strength               []Cognition    [x] Functional transfers   [x] IADLs         [x] Safety Awareness   [x]Endurance    [] Fine Coordination              [x] Balance      [] Vision/perception   []Sensation     []Gross Motor Coordination  [] ROM  [] Delirium                   [] Motor Control     OT PLAN OF CARE   OT POC based on physician orders, patient diagnosis and results of clinical assessment    Frequency/Duration 1-3 days/wk for 2 weeks PRN     Specific OT Treatment Interventions to include:   * Instruction/training on adapted ADL techniques and AE recommendations to increase functional independence within precautions       * Training on energy conservation strategies, correct breathing pattern and techniques to improve independence/tolerance for self-care routine  * Functional transfer/mobility training/DME recommendations for increased independence, safety, and fall prevention  * Patient/Family education to increase follow through with safety techniques and functional independence  * Recommendation of environmental modifications for increased safety with functional transfers/mobility and ADLs  * Therapeutic exercise to improve motor endurance, ROM, and functional strength for ADLs/functional transfers  * Therapeutic activities to facilitate/challenge dynamic balance, stand tolerance for increased safety and independence with ADLs  * Positioning to improve skin integrity, interaction with environment and functional independence    Recommended Adaptive Equipment: TBD      Home Living: Lives with brother (has MS and caregivers), single family home, 1 story, 3 steps to enter with rail. Flight of stairs to basement. Bathroom set-up: tub/shower, grab bars , shower chair         Equipment owned: Wheelchair, VISup, Kahuna, Bedside commode, lift alert     Prior Level of Function: Pt reports being independent with dressing and toileting but has assist with bathing, brother nearby assists with IADLs; ambulated independently with cane, used wheelchair. Driving: Yes , occasionally     Pain Level: pt denied pain ; Nursing notified.      Cognition: A&O: 4/4; Follows 3 step directions   Memory: intact   Sequencing: intact   Problem solving: fair    Judgement/safety: fair     [facility]   AM-PAC Daily Activity Inpatient   How much help for putting on and taking off regular lower body clothing?: Total  How much help for Bathing?: A Lot  How much help for Toileting?: A Lot  How much help for putting on and taking off regular upper body clothing?: A Little  How much help for taking care of personal grooming?: A Little  How much help for eating meals?: A Little  AM-PAC Inpatient Daily Activity Raw Score: 14  AM-PAC Inpatient ADL T-Scale Score : 33.39  ADL Inpatient CMS 0-100% Score: 59.67  ADL Inpatient CMS G-Code Modifier : CK     Functional Assessment:    Initial Eval Status  Date: 9/22/22   Treatment Status  Date: STGs = LTGs  Time frame: 10-14 days   Feeding Supervision     Independent    Grooming Stand by Assist  Set-up for oral hygiene seated at EOB     Supervision    UB Dressing Minimal Assist    Supervision    LB Dressing Dependent     Minimal Assist    Bathing Moderate / Maximal Assist     Minimal Assist    Toileting Dependent   For rear hygiene. Urinal at bedside. Moderate Assist    Bed Mobility  Supine to sit: Minimal Assist  Sit to supine: Minimal Assist   Scooting: Moderate Assist toward head of bed     Supine to sit: Independent   Sit to supine: Independent    Functional Transfers Attempted, however patient unable to maintain non-weight bearing through left lower extremity to stand from EOB.     Minimal Assist , non-weight bearing left LE   Functional Mobility Not Assessed     Moderate Assist with use of wheeled walker, non-weight bearing left LE     Balance Sitting:     Static: fair plus    Dynamic: fair plus  Standing: n/a    Sitting:     Static: good    Dynamic: good  Standing: fair plus with walker    Activity Tolerance fair    Increase standing tolerance >1-2 minutes for improved engagement with functional transfers and indep in ADLs     Visual/  Perceptual Glasses: Yes     Reports changes in vision since admission: No      NA      Hand Dominance: Right      AROM (PROM) Strength Additional Info:  Goal:   RUE  WFL 4-/5 good  and wfl FMC/dexterity noted during ADL tasks   Improve overall RUE strength  for participation in functional tasks   LUE Shoulder AROM 2/2 CVA   Distal UE WFL   2+/5 Fair  and FMC/dexterity noted during ADL tasks ; index finger amputation    Improve overall LUE strength  for participation in functional tasks     Hearing:  [facility]   Sensation:  No c/o numbness or tingling  Tone: WFL   Edema:      Vitals:  HR at rest: 118 bpm HR with activity:  bpm HR at end of session: 104 bpm   SpO2 at rest: 97% SpO2 with activity: % SpO2 at end of session: 97%   BP at rest:  BP with activity:  BP at end of session:      Comments: RN cleared patient for OT. Upon arrival patient in supine. Therapist facilitated and instructed pt on adapted  techniques & compensatory strategies to improve safety and independence with basic ADLs, bed mobility, functional transfers and mobility to allow pt to achieve highest level of independence and safely. Pt demonstrated fair understanding of education & follow through. At end of session, patient was supine, alarm on, with call light and phone within reach, all lines and tubes intact. Overall, patient demonstrated  decreased independence and safety during completion of ADL tasks. Pt would benefit from continued skilled OT to increase safety and independence with completion of ADL tasks and functional mobility for improved quality of life. Treatment: OT treatment provided this date includes:   Instructions/training on safety, sequencing, and adapted techniques for completion of ADLs. Facilitated bed mobility with cues for proper body mechanics and sequencing to prepare for ADL completion. Instruction/training on safe functional mobility/transfer techniques including hand and feet placement   Facilitated proper positioning/alignment to improve interaction with environment, breathing, overall functioning and decrease/prevent edema. Sitting EOB up to 15 minutes to improve dynamic sitting balance and activity tolerance during ADLs   Skilled monitoring of O2 sats - see section above     Rehab Potential: Good for established goals. Patient / Family Goal: Pt in agreement with POC       Patient and/or family were instructed on functional diagnosis, prognosis/goals and OT plan of care. Demonstrated fair understanding.      Eval Complexity: Low    Time In: 2:00 PM Time Out: 2:28 PM    Total Treatment Time: 8       Min Units   OT Eval Low 97165  X  1    OT Eval Medium 72986      OT Eval High 76063      OT Re-Eval [id number]            ADL/Self Care 76068     Therapeutic Activities 24434 8 1   Therapeutic Ex 94718       Orthotic Management 47844       Manual [id number]     Neuro Re-Ed 97820       Non-Billable Time        Evaluation Time additionally includes thorough review of current medical information, gathering information on past medical history/social history and prior level of function, interpretation of standardized testing/informal observation of tasks, assessment of data and development of plan of care and goals.         Evaluating OT: Dickson Whitmore OTR/L #[id number]

## 2022-09-22 NOTE — CARE COORDINATION
9/22/2022 1333 CM note: Negative covid 9/19/22. Pt with positive blood cx  and acute osteomyelitis left calcaneus. Per ID will need IV Rocephin x 6 weeks. PICC line in place. Per [facility], [email or web address], patient accepted for skilled care and will [address] after PT/OT notes are available. For SNF WILL NEED PRECERT, HENS, signed KELLY , covid testing, PT/OT dorita Dumont RN

## 2022-09-23 LAB
ALBUMIN SERPL-MCNC: 2.8 G/DL (ref 3.5–5.2)
ALP BLD-CCNC: 87 U/L (ref 40–129)
ALT SERPL-CCNC: 14 U/L (ref 0–40)
ANION GAP SERPL CALCULATED.3IONS-SCNC: 7 MMOL/L (ref 7–16)
AST SERPL-CCNC: 21 U/L (ref 0–39)
BASOPHILS ABSOLUTE: 0.09 E9/L (ref 0–0.2)
BASOPHILS RELATIVE PERCENT: 0.7 % (ref 0–2)
BILIRUB SERPL-MCNC: 0.5 MG/DL (ref 0–1.2)
BUN BLDV-MCNC: 10 MG/DL (ref 6–23)
CALCIUM SERPL-MCNC: 8.3 MG/DL (ref 8.6–10.2)
CHLORIDE BLD-SCNC: 97 MMOL/L (ref 98–107)
CO2: 23 MMOL/L (ref 22–29)
CREAT SERPL-MCNC: 0.7 MG/DL (ref 0.7–1.2)
EOSINOPHILS ABSOLUTE: 0.4 E9/L (ref 0.05–0.5)
EOSINOPHILS RELATIVE PERCENT: 3.2 % (ref 0–6)
GFR AFRICAN AMERICAN: >60
GFR NON-AFRICAN AMERICAN: >60 ML/MIN/1.73
GLUCOSE BLD-MCNC: 112 MG/DL (ref 74–99)
HCT VFR BLD CALC: 28.7 % (ref 37–54)
HEMOGLOBIN: 9.4 G/DL (ref 12.5–16.5)
IMMATURE GRANULOCYTES #: 0.43 E9/L
IMMATURE GRANULOCYTES %: 3.4 % (ref 0–5)
LYMPHOCYTES ABSOLUTE: 1.98 E9/L (ref 1.5–4)
LYMPHOCYTES RELATIVE PERCENT: 15.7 % (ref 20–42)
MCH RBC QN AUTO: 28.8 PG (ref 26–35)
MCHC RBC AUTO-ENTMCNC: 32.8 % (ref 32–34.5)
MCV RBC AUTO: 88 FL (ref 80–99.9)
METER GLUCOSE: 109 MG/DL (ref 74–99)
METER GLUCOSE: 114 MG/DL (ref 74–99)
METER GLUCOSE: 151 MG/DL (ref 74–99)
METER GLUCOSE: 185 MG/DL (ref 74–99)
MONOCYTES ABSOLUTE: 0.79 E9/L (ref 0.1–0.95)
MONOCYTES RELATIVE PERCENT: 6.3 % (ref 2–12)
NEUTROPHILS ABSOLUTE: 8.95 E9/L (ref 1.8–7.3)
NEUTROPHILS RELATIVE PERCENT: 70.7 % (ref 43–80)
PDW BLD-RTO: 15.2 FL (ref 11.5–15)
PLATELET # BLD: 492 E9/L (ref 130–450)
PMV BLD AUTO: 9.2 FL (ref 7–12)
POTASSIUM SERPL-SCNC: 4.1 MMOL/L (ref 3.5–5)
RBC # BLD: 3.26 E12/L (ref 3.8–5.8)
SEDIMENTATION RATE, ERYTHROCYTE: 125 MM/HR (ref 0–15)
SODIUM BLD-SCNC: 127 MMOL/L (ref 132–146)
TOTAL PROTEIN: 8 G/DL (ref 6.4–8.3)
WBC # BLD: 12.6 E9/L (ref 4.5–11.5)

## 2022-09-23 PROCEDURE — 85025 COMPLETE CBC W/AUTO DIFF WBC: CPT

## 2022-09-23 PROCEDURE — 1200000000 HC SEMI PRIVATE

## 2022-09-23 PROCEDURE — 6370000000 HC RX 637 (ALT 250 FOR IP): Performed by: SPECIALIST

## 2022-09-23 PROCEDURE — 6360000002 HC RX W HCPCS: Performed by: STUDENT IN AN ORGANIZED HEALTH CARE EDUCATION/TRAINING PROGRAM

## 2022-09-23 PROCEDURE — 85651 RBC SED RATE NONAUTOMATED: CPT

## 2022-09-23 PROCEDURE — 6370000000 HC RX 637 (ALT 250 FOR IP): Performed by: STUDENT IN AN ORGANIZED HEALTH CARE EDUCATION/TRAINING PROGRAM

## 2022-09-23 PROCEDURE — 80053 COMPREHEN METABOLIC PANEL: CPT

## 2022-09-23 PROCEDURE — 2580000003 HC RX 258: Performed by: SPECIALIST

## 2022-09-23 PROCEDURE — 6370000000 HC RX 637 (ALT 250 FOR IP): Performed by: INTERNAL MEDICINE

## 2022-09-23 PROCEDURE — 6360000002 HC RX W HCPCS: Performed by: SPECIALIST

## 2022-09-23 PROCEDURE — 99232 SBSQ HOSP IP/OBS MODERATE 35: CPT | Performed by: INTERNAL MEDICINE

## 2022-09-23 PROCEDURE — 6360000002 HC RX W HCPCS: Performed by: INTERNAL MEDICINE

## 2022-09-23 PROCEDURE — 82962 GLUCOSE BLOOD TEST: CPT

## 2022-09-23 PROCEDURE — 36415 COLL VENOUS BLD VENIPUNCTURE: CPT

## 2022-09-23 RX ADMIN — HYDROCODONE BITARTRATE AND ACETAMINOPHEN 2 TABLET: 5; 325 TABLET ORAL at 10:21

## 2022-09-23 RX ADMIN — INSULIN GLARGINE 5 UNITS: 100 INJECTION, SOLUTION SUBCUTANEOUS at 21:52

## 2022-09-23 RX ADMIN — METRONIDAZOLE 500 MG: 500 TABLET ORAL at 14:07

## 2022-09-23 RX ADMIN — Medication 10 ML: at 21:51

## 2022-09-23 RX ADMIN — GABAPENTIN 300 MG: 300 CAPSULE ORAL at 08:14

## 2022-09-23 RX ADMIN — METRONIDAZOLE 500 MG: 500 TABLET ORAL at 21:46

## 2022-09-23 RX ADMIN — ASPIRIN 81 MG: 81 TABLET, COATED ORAL at 08:14

## 2022-09-23 RX ADMIN — HYDROCODONE BITARTRATE AND ACETAMINOPHEN 2 TABLET: 5; 325 TABLET ORAL at 14:07

## 2022-09-23 RX ADMIN — ATORVASTATIN CALCIUM 40 MG: 40 TABLET, FILM COATED ORAL at 21:50

## 2022-09-23 RX ADMIN — Medication 100 UNITS: at 21:51

## 2022-09-23 RX ADMIN — WATER 2000 MG: 1 INJECTION INTRAMUSCULAR; INTRAVENOUS; SUBCUTANEOUS at 14:08

## 2022-09-23 RX ADMIN — HYDROCODONE BITARTRATE AND ACETAMINOPHEN 2 TABLET: 5; 325 TABLET ORAL at 18:19

## 2022-09-23 RX ADMIN — GABAPENTIN 300 MG: 300 CAPSULE ORAL at 21:47

## 2022-09-23 RX ADMIN — Medication 100 UNITS: at 08:21

## 2022-09-23 RX ADMIN — HYDROCODONE BITARTRATE AND ACETAMINOPHEN 2 TABLET: 5; 325 TABLET ORAL at 22:31

## 2022-09-23 RX ADMIN — Medication 10 ML: at 08:14

## 2022-09-23 RX ADMIN — CYCLOBENZAPRINE HYDROCHLORIDE 10 MG: 5 TABLET, FILM COATED ORAL at 21:46

## 2022-09-23 RX ADMIN — ENOXAPARIN SODIUM 40 MG: 100 INJECTION SUBCUTANEOUS at 08:13

## 2022-09-23 RX ADMIN — METRONIDAZOLE 500 MG: 500 TABLET ORAL at 05:48

## 2022-09-23 RX ADMIN — HYDROCODONE BITARTRATE AND ACETAMINOPHEN 2 TABLET: 5; 325 TABLET ORAL at 05:55

## 2022-09-23 ASSESSMENT — PAIN SCALES - GENERAL
PAINLEVEL_OUTOF10: 8
PAINLEVEL_OUTOF10: 9
PAINLEVEL_OUTOF10: 7
PAINLEVEL_OUTOF10: 8
PAINLEVEL_OUTOF10: 9

## 2022-09-23 ASSESSMENT — PAIN DESCRIPTION - DESCRIPTORS
DESCRIPTORS: ACHING

## 2022-09-23 ASSESSMENT — PAIN DESCRIPTION - ORIENTATION
ORIENTATION: LEFT

## 2022-09-23 ASSESSMENT — PAIN DESCRIPTION - LOCATION
LOCATION: FOOT
LOCATION: FOOT
LOCATION: LEG
LOCATION: FOOT
LOCATION: GENERALIZED
LOCATION: FOOT

## 2022-09-23 NOTE — CARE COORDINATION
9-23-Cm note:  ( Covid neg 9-19) [facility] started Cheryl Suazo yesterday, placed call to liaison to have her call this CM if precert is obtained today, pt will need a HENS, signed KELLY, NO Covid testing needed.  Electronically signed by Yanique Wesley RN on 9/23/2022 at 10:23 AM

## 2022-09-23 NOTE — PROGRESS NOTES
[address] [place], [place] [address]ist   Progress Note    Admitting Date and Time: 9/13/2022 12:00 PM  Admit Dx: Lactic acidosis [E87.2]  Septicemia ([address] [A41.9]  Wound infection [T14. 8XXA, L08.9]  Wound cellulitis [L03.90]  Sepsis ([address] [A41.9]    Subjective:    9/18: Pt  sitting up in bed in no acute distress. States feels well today. For procedure in am. ? PICC line placement. Denies any new concerns at this time. 9/19: Pt somewhat frustrated that he was NPO most of the day and then procedure got cancelled. Per RN, urgery got postponed due to scheduling issues for OR time. He was placed on schedule for tomorrow. PICC line placed this morning by IV team.    9/20: Pt  is hopeful he can get surgery tomorrow. 9/21: Pt is sitting up in bed. He had surgery today and is having pain. He was asking for something for pain more than Tylenol.  After questioning, he had tolerated Vicodin in past.     9/22: Pt    heparin flush  100 Units IntraCATHeter BID    cefTRIAXone (ROCEPHIN) IV  2,000 mg IntraVENous Q24H    metroNIDAZOLE  500 mg Oral 3 times per day    sodium chloride flush  10 mL IntraVENous 2 times per day    insulin glargine  5 Units SubCUTAneous Nightly    gabapentin  300 mg Oral BID    [Held by provider] clopidogrel  75 mg Oral Daily    aspirin  81 mg Oral Daily    atorvastatin  40 mg Oral Nightly    cyclobenzaprine  10 mg Oral Nightly    insulin lispro  0-4 Units SubCUTAneous Q4H    enoxaparin  40 mg SubCUTAneous Daily     HYDROcodone 5 mg - acetaminophen, 1 tablet, Q4H PRN   Or  HYDROcodone 5 mg - acetaminophen, 2 tablet, Q4H PRN  sodium chloride flush, 10 mL, PRN  sodium chloride, 25 mL, PRN  povidone-iodine, , PRN  calcium carbonate, 500 mg, TID PRN  melatonin, 9 mg, Nightly PRN  diphenhydrAMINE, 25 mg, Q6H PRN  glucose, 4 tablet, PRN  dextrose bolus, 125 mL, PRN   Or  dextrose bolus, 250 mL, PRN  glucagon (rDNA), 1 mg, PRN  dextrose, , Continuous PRN  sodium chloride, , PRN  promethazine, 12.5 mg, Q6H PRN   Or  ondansetron, 4 mg, Q6H PRN  polyethylene glycol, 17 g, Daily PRN  acetaminophen, 650 mg, Q6H PRN   Or  acetaminophen, 650 mg, Q6H PRN       Objective:    /60   Pulse 92   Temp 97.7 °F (36.5 °C) (Temporal)   Resp 18   Ht 5' 11\" (1.803 m)   Wt 192 lb (87.1 kg)   SpO2 95%   BMI 26.78 kg/m²   General Appearance: alert and oriented to person, place and time and in no acute distress  Skin: warm and dry  Head: normocephalic and atraumatic  Eyes: pupils equal, round, and reactive to light, extraocular eye movements intact, conjunctivae normal  Neck: neck supple and non tender without mass   Pulmonary/Chest: clear to auscultation bilaterally- no wheezes, rales or rhonchi, normal air movement, no respiratory distress  Cardiovascular: normal rate, normal S1 and S2 and no RGM  Abdomen: soft, non-tender, non-distended, normal bowel sounds  Extremities: no cyanosis, no clubbing and no edema. LLE Surgical dressing CDI. Neurologic: no cranial nerve deficit and speech normal      Recent Labs     09/21/22  0525 09/23/22  1030   * 127*   K 3.8 4.1   CL 99 97*   CO2 21* 23   BUN 13 10   CREATININE 0.7 0.7   GLUCOSE 146* 112*   CALCIUM 8.2* 8.3*         Recent Labs     09/23/22  1030   ALKPHOS 87   PROT 8.0   LABALBU 2.8*   BILITOT 0.5   AST 21   ALT 14         Recent Labs     09/21/22  0525 09/23/22  1030   WBC 15.8* 12.6*   RBC 3.27* 3.26*   HGB 9.6* 9.4*   HCT 28.1* 28.7*   MCV 85.9 88.0   MCH 29.4 28.8   MCHC 34.2 32.8   RDW 14.9 15.2*   * 492*   MPV 9.5 9.2              Radiology:   VL LOWER EXTREMITY ARTERIAL SEGMENTAL PRESSURES W PPG BILATERAL   Final Result   In spite of normal bilateral ankle-brachial indices, Doppler and PVR   waveforms suggest at least mild, symmetric bilateral lower extremity arterial   stenosis. Correlation with CTA of the abdomen/pelvis with lower extremity   runoff could be considered for better assessment.          XR FOOT LEFT (2 VIEWS)   Final Result   Marked soft and ID plans 6 weeks. Repeat blood cultures show no growth. -SL PICC line placed 9/19 R brachial vein. 2.         Lactic acidosis(resolved)  -Due to the above but resolved with IV fluid hydration  -Lactic acid trend as follows 4.3-->4.1- >1.9     3. Type II diabetes mellitus  -Added Lantus 5 units nightly in addition to corrective scale with improved glycemic control      4. Hypertension/CAD  -Hydrochlorothiazide on hold given hyponatremia  -Holding Plavix prior to surgery 9/21. Today, podiatry okayed resumption of Plavix and will start that in the morning. 5.        Hyponatremia  -likely component of hypovolemia due to sepsis, however he has had this in the past due to alcohol abuse. States he has been drinking 6-8 beers a day lately  -Now that he has been volume resuscitated from sepsis standpoint, the fluids have been discontinued  -Sodium is correcting at an appropriate rate; Na had been stable 129 but today down to 127. Recheck in morning     6. Alcohol abuse  -6-8 beers per day  -counseled on cessation   -no signs of withdrawal syndrome at this time, but will have low threshold for initiating CIWA protocol but appears to be out of window for this. Disposition: Patient is now agreeable to SNF or LTAC. At least several more days in the hospital for surgery on 9/21, postoperative care. He requested [facility] and they accepted. Precert initiated. NOTE: This report was transcribed using voice recognition software. Every effort was made to ensure accuracy; however, inadvertent computerized transcription errors may be present.      Electronically signed by Deneen Chapman MD on 9/23/2022 at 6:29 PM

## 2022-09-23 NOTE — PROGRESS NOTES
NAME: Dimitri Carias  MR:  [id number]  :   1960  DATE OF SERVICE:22    This is a face to face encounter with Dimitri Carias 60 y.o. male on 22  Elements of this note, including Diagnosis,  Interval History, Past Medical/Surgical/Family/Social Histories, ROS, physical exam, and Assessment and Plan were copied and pasted from Previous. Updates have been made where noted and reflect current exam and medical decision making from the DOS of this encounter. CHIEF COMPLAINT     ID following for   Chief Complaint   Patient presents with    Fatigue     Onset several days-hx left foot diabetic wound     HISTORY OF PRESENT ILLNESS     Pt seen and examined   22  Patient is resting in bed- no distress- no documented temps today. On room air.       2022  Asleep  but arousable nad   Had no questions or concerns   Tmax99   Afebrile on RA    Wbc15.8  cr 0.7        tissue cx anaerobic gnr/gpc    foor cx MRSA/strep mixed GNR   blood cx BHS group A /S pyogenes    Patient is tolerating medications. No reported adverse drug reactions. REVIEW OF SYSTEMS     As stated above in the chief complaint, otherwise negative.   CURRENT MEDICATIONS     Current Facility-Administered Medications:     HYDROcodone-acetaminophen (NORCO) 5-325 MG per tablet 1 tablet, 1 tablet, Oral, Q4H PRN, 1 tablet at 22 0633 **OR** HYDROcodone-acetaminophen (NORCO) 5-325 MG per tablet 2 tablet, 2 tablet, Oral, Q4H PRN, Gunjan Ross MD, 2 tablet at 22 1021    heparin flush 100 UNIT/ML injection 100 Units, 100 Units, IntraCATHeter, BID, Gunjan Ross MD, 100 Units at 22 1839    cefTRIAXone (ROCEPHIN) 2,000 mg in sterile water 20 mL IV syringe, 2,000 mg, IntraVENous, Q24H, Temo Mejia MD, 2,000 mg at 22 1350    metroNIDAZOLE (FLAGYL) tablet 500 mg, 500 mg, Oral, 3 times per day, Temo Mejia MD, 500 mg at 22 0548    sodium chloride flush 0.9 % injection 10 mL, 10 mL, IntraVENous, 2 times per day, Volodymyr Aguilar MD, 10 mL at 09/23/22 0814    sodium chloride flush 0.9 % injection 10 mL, 10 mL, IntraVENous, PRN, Volodymyr Aguilar MD    0.9 % sodium chloride infusion, 25 mL, IntraVENous, PRN, Volodymyr Aguilar MD    insulin glargine (LANTUS) injection vial 5 Units, 5 Units, SubCUTAneous, Nightly, Lamont Wells DO, 5 Units at 09/22/22 2135    povidone-iodine (BETADINE) 10 % external solution, , Topical, PRN, Gian Haq MD    calcium carbonate (TUMS) chewable tablet 500 mg, 500 mg, Oral, TID PRN, César Hammonds DO, 500 mg at 09/20/22 2046    gabapentin (NEURONTIN) capsule 300 mg, 300 mg, Oral, BID, Yeison Ahammad, DPM, 300 mg at 09/23/22 5693    [Held by provider] clopidogrel (PLAVIX) tablet 75 mg, 75 mg, Oral, Daily, Yeison Ahammad, DPM, 75 mg at 09/17/22 0848    aspirin EC tablet 81 mg, 81 mg, Oral, Daily, Yeison Ahammad, DPM, 81 mg at 09/23/22 0814    melatonin tablet 9 mg, 9 mg, Oral, Nightly PRN, Yeison Ahammad, DPM, 9 mg at 09/21/22 2215    diphenhydrAMINE (BENADRYL) tablet 25 mg, 25 mg, Oral, Q6H PRN, Yeison Ahammad, DPM, 25 mg at 09/17/22 2239    atorvastatin (LIPITOR) tablet 40 mg, 40 mg, Oral, Nightly, Yeison Ahammad, DPM, 40 mg at 09/22/22 2126    cyclobenzaprine (FLEXERIL) tablet 10 mg, 10 mg, Oral, Nightly, Yeison Ahammad, DPM, 10 mg at 09/22/22 2125    glucose chewable tablet 16 g, 4 tablet, Oral, PRN, Yeison Ahammad, DPM    dextrose bolus 10% 125 mL, 125 mL, IntraVENous, PRN **OR** dextrose bolus 10% 250 mL, 250 mL, IntraVENous, PRN, Yeison Ahammad, DPM    glucagon (rDNA) injection 1 mg, 1 mg, SubCUTAneous, PRN, Yeison Ahammad, DPM    dextrose 10 % infusion, , IntraVENous, Continuous PRN, Yeison Ahammad, DPM    insulin lispro (HUMALOG) injection vial 0-4 Units, 0-4 Units, SubCUTAneous, Q4H, Yeison Ahammad, DPM, 1 Units at 09/20/22 2052    0.9 % sodium chloride infusion, , IntraVENous, PRN, Yeison Ahammad, DPM    enoxaparin (LOVENOX) injection 40 mg, 40 mg, SubCUTAneous, Daily, Yeison Ahammad, DPM, 40 mg at 22 0813    promethazine (PHENERGAN) tablet 12.5 mg, 12.5 mg, Oral, Q6H PRN **OR** ondansetron (ZOFRAN) injection 4 mg, 4 mg, IntraVENous, Q6H PRN, Yeison Ahammad, DPM, 4 mg at 09/15/22 1652    polyethylene glycol (GLYCOLAX) packet 17 g, 17 g, Oral, Daily PRN, Yeison Ahammad, DPM    acetaminophen (TYLENOL) tablet 650 mg, 650 mg, Oral, Q6H PRN **OR** acetaminophen (TYLENOL) suppository 650 mg, 650 mg, Rectal, Q6H PRN, Yeison Ahammad, DPM  PHYSICAL EXAM     BP (!) 140/66   Pulse 80   Temp 97.6 °F (36.4 °C) (Temporal)   Resp 18   Ht 5' 11\" (1.803 m)   Wt 192 lb (87.1 kg)   SpO2 97%   BMI 26.78 kg/m²   Temp  Av.6 °F (36.4 °C)  Min: 97.6 °F (36.4 °C)  Max: 97.6 °F (36.4 °C)  Constitutional:  The patient is awake, alert  in bed- watching TV  Skin:    Warm and dry. No rash   HEENT:     AT/NC  Chest:   CTA ant  On RA  Cardiovascular:  S1 and S2 are rhythmic and regular. Abdomen:    Benign to palpation. DISTENDED  Extremities:   No edema. LEFT LE ACE- in place- no drainage      CNS    Awake nad      Right arm with PICC  Peripherally Inserted Central Catheter:  PICC Single Lumen 22 Right Brachial (Active)   Central Line Being Utilized Yes 22   Criteria for Appropriate Use Long term IV/antibiotic administration 22   Site Assessment Clean;Dry; Intact 22 1036   Phlebitis Assessment No symptoms 22   Infiltration Assessment 0 22   Lumen Color/Status Pink;Flushed 22 1036   Extremity Circumference (cm) 32 cm 22   Alcohol Cap Used Yes 22   Date of Last Dressing Change 22   Dressing Type Transparent; Antimicrobial;Securing device 22 1036   Dressing Status Clean, dry & intact 22 1036        DIAGNOSTIC RESULTS   Radiology:    Recent Labs     22  0525 22  1030   WBC 15.8* 12.6*   RBC 3.27* 3.26*   HGB 9.6* 9.4*   HCT 28.1* 28.7*   MCV 85.9 88.0   MCH 29.4 28.8   MCHC 34.2 32.8   RDW 14.9 15.2*   * 492*   MPV 9.5 9.2       Recent Labs     09/21/22  0525 09/23/22  1030   * 127*   K 3.8 4.1   CL 99 97*   CO2 21* 23   BUN 13 10   CREATININE 0.7 0.7   GLUCOSE 146* 112*   PROT  --  8.0   LABALBU  --  2.8*   CALCIUM 8.2* 8.3*   BILITOT  --  0.5   ALKPHOS  --  87   AST  --  21   ALT  --  14       Lab Results   Component Value Date    CRP 24.9 (H) 09/13/2022    CRP 2.3 (H) 02/14/2021     Lab Results   Component Value Date    SEDRATE 125 (H) 09/23/2022    SEDRATE 90 (H) 09/13/2022    SEDRATE 87 (H) 02/14/2021     Recent Labs     09/23/22  1030   AST 21   ALT 14       Lab Results   Component Value Date/Time    CHOL 164 03/01/2016 08:10 AM    TRIG 170 03/01/2016 08:10 AM    HDL 43 03/01/2016 08:10 AM    LDLCALC 87 03/01/2016 08:10 AM    LABVLDL 34 03/01/2016 08:10 AM     Lab Results   Component Value Date/Time    VITD25 12 (L) 12/28/2020 09:15 AM       Microbiology:   No results for input(s): COVID19 in the last 72 hours.     Lab Results   Component Value Date/Time    BC 5 Days no growth 09/15/2022 07:24 AM    BC 5 Days no growth 02/16/2021 04:37 PM    BC Previously positive blood culture called 02/14/2021 07:07 PM    [facility]  02/14/2021 07:07 PM     Refer to previous culture for susceptibility results  of CXBL2 (LAC) collected 02/14/2021 at 19:07      BLOODCULT2 5 Days no growth 09/15/2022 07:25 AM    BLOODCULT2 Previously positive blood culture called 09/13/2022 02:59 PM    BLOODCULT2  09/13/2022 02:59 PM     Refer to previous culture CXBL 9/13/2022 1459 for susceptibility results    ORG Anaerobic gram negative justo 09/14/2022 04:41 PM    ORG Anaerobic gram positive cocci 09/14/2022 04:41 PM    ORG BHS Group A (Strep pyogenes) 09/13/2022 02:59 PM    ORG BHS Group A (Strep pyogenes) 09/13/2022 02:59 PM    ORG Staphylococcus aureus 09/13/2022 02:59 PM    ORG Strep pyogenes (Beta Strep Group A) 09/13/2022 02:59 PM     WOUND/ABSCESS   Date Value Ref Range Status   09/13/2022 (A)  Final    Mixed laura isolated. Further workup and sensitivity testing  is not routinely indicated and will not be performed. Mixed laura isolated includes:  Mixed Gram negative rods  Gram positive rods catalase negative     09/13/2022   Final    Heavy growth  This isolate is presumed to be resistant based on the  detection of inducible Clindamycin resistance. Clindamycin  may still be effective in some patients. 09/13/2022 Heavy growth  Final     Smear, Respiratory   Date Value Ref Range Status   01/01/2021   Final    Group 6: <25 PMN's/LPF and <25 Epithelial cells/LPF  Rare Polymorphonuclear leukocytes  Epithelial cells not seen  No organisms seen           Component Value Date/Time    AFBCX No growth after 6 weeks of incubation. 02/16/2021 0732    AFBCX No growth after 6 weeks of incubation. 02/16/2021 0730    FUNGSM No Fungal elements seen 09/14/2022 1641    LABFUNG No growth after 4 weeks of incubation. 02/16/2021 0732    LABFUNG No growth after 4 weeks of incubation. 02/16/2021 0730       MRSA Culture Only   Date Value Ref Range Status   12/26/2020 Methicillin resistant Staph aureus not isolated  Final     CULTURE, RESPIRATORY   Date Value Ref Range Status   01/01/2021 Oral Pharyngeal Laura absent  Growth not present    Final     FINAL IMPRESSION    Pt had   Chief Complaint   Patient presents with    Fatigue     Onset several days-hx left foot diabetic wound    Admitted for Lactic acidosis [E87.2]  HAS SEPSIS  -Streptococcus species SEPTICEMIA 9/13  Foot cx MSSA/Strep   -LEUKOCYTOSIS trending down   -LEFT LE CELLULITIS  -LEFT HEEL OM   Leukocytosis follow   9/14 1. Incision and drainage, left calcaneus bone. 2.   Incision and drainage posterior left leg.     9/21 LEFT FOOT PARTIAL CALCANECTOMY AND DEBRIDEMENT      Plan:   cefTRIAXone (ROCEPHIN) 2,000 mg in sterile water 20 mL IV syringe, Q24H  metroNIDAZOLE (FLAGYL) tablet 500 mg, 3 times per day  WBC- 12.6 , sed rate- 125 Continue wound care per podiatry  Can d/c- awaiting precert - awaiting to hear from [employer] rec done from [address] and labs were reviewed per medical records. Thank you for involving me in the care of Aaron Wetson I will continue to follow. Please do not hesitate to call for any questions or concerns. Electronically signed by ALBERT Cruz on 9/23/2022 at 12:28 PM      As above       This is a face to face encounter with Aaron Weston on 09/24/22. I have performed and participated in the history, exam, medical decision making, and  POC with the NURSE PRACTITIONER, ALBERT Cruz. in bed awaiting d/c has no c/o foot dressed  Afebrile on RA wbc12.6  cr0.7 [id number]     cefTRIAXone (ROCEPHIN) 2,000 mg in sterile water 20 mL IV syringe, Q24H  metroNIDAZOLE (FLAGYL) tablet 500 mg, 3 times per day         Imaging and labs were reviewed. Aaron Weston was informed of their diagnosis, indications, risks and benefits of treatment. Aaron Weston had the opportunity to ask questions. All questions were answered. Thank you for involving me in the care of Aaron Weston. Please do not hesitate to call for any questions or concerns.     Electronically signed by Garett Day MD on 9/24/2022 at 11:16 AM

## 2022-09-23 NOTE — PROGRESS NOTES
[address] [place], [place] [address]ist   Progress Note    Admitting Date and Time: 9/13/2022 12:00 PM  Admit Dx: Lactic acidosis [E87.2]  Septicemia ([address] [A41.9]  Wound infection [T14. 8XXA, L08.9]  Wound cellulitis [L03.90]  Sepsis ([address] [A41.9]    Subjective:    9/18: Pt  sitting up in bed in no acute distress. States feels well today. For procedure in am. ? PICC line placement. Denies any new concerns at this time. 9/19: Pt somewhat frustrated that he was NPO most of the day and then procedure got cancelled. Per RN, urgery got postponed due to scheduling issues for OR time. He was placed on schedule for tomorrow. PICC line placed this morning by IV team.    9/20: Pt  is hopeful he can get surgery tomorrow. 9/21: Pt is sitting up in bed. He had surgery today and is having pain. He was asking for something for pain more than Tylenol.  After questioning, he had tolerated Vicodin in past.        heparin flush  100 Units IntraCATHeter BID    cefTRIAXone (ROCEPHIN) IV  2,000 mg IntraVENous Q24H    metroNIDAZOLE  500 mg Oral 3 times per day    sodium chloride flush  10 mL IntraVENous 2 times per day    insulin glargine  5 Units SubCUTAneous Nightly    gabapentin  300 mg Oral BID    [Held by provider] clopidogrel  75 mg Oral Daily    aspirin  81 mg Oral Daily    atorvastatin  40 mg Oral Nightly    cyclobenzaprine  10 mg Oral Nightly    insulin lispro  0-4 Units SubCUTAneous Q4H    enoxaparin  40 mg SubCUTAneous Daily     HYDROcodone 5 mg - acetaminophen, 1 tablet, Q4H PRN   Or  HYDROcodone 5 mg - acetaminophen, 2 tablet, Q4H PRN  sodium chloride flush, 10 mL, PRN  sodium chloride, 25 mL, PRN  povidone-iodine, , PRN  calcium carbonate, 500 mg, TID PRN  melatonin, 9 mg, Nightly PRN  diphenhydrAMINE, 25 mg, Q6H PRN  glucose, 4 tablet, PRN  dextrose bolus, 125 mL, PRN   Or  dextrose bolus, 250 mL, PRN  glucagon (rDNA), 1 mg, PRN  dextrose, , Continuous PRN  sodium chloride, , PRN  promethazine, 12.5 mg, Q6H PRN Or  ondansetron, 4 mg, Q6H PRN  polyethylene glycol, 17 g, Daily PRN  acetaminophen, 650 mg, Q6H PRN   Or  acetaminophen, 650 mg, Q6H PRN       Objective:    BP (!) 140/66   Pulse 80   Temp 97.6 °F (36.4 °C) (Temporal)   Resp 18   Ht 5' 11\" (1.803 m)   Wt 192 lb (87.1 kg)   SpO2 97%   BMI 26.78 kg/m²   General Appearance: alert and oriented to person, place and time and in no acute distress  Skin: warm and dry  Head: normocephalic and atraumatic  Eyes: pupils equal, round, and reactive to light, extraocular eye movements intact, conjunctivae normal  Neck: neck supple and non tender without mass   Pulmonary/Chest: clear to auscultation bilaterally- no wheezes, rales or rhonchi, normal air movement, no respiratory distress  Cardiovascular: normal rate, normal S1 and S2 and no RGM  Abdomen: soft, non-tender, non-distended, normal bowel sounds  Extremities: no cyanosis, no clubbing and no edema. LLE Surgical dressing CDI. Neurologic: no cranial nerve deficit and speech normal      Recent Labs     09/21/22  0525   *   K 3.8   CL 99   CO2 21*   BUN 13   CREATININE 0.7   GLUCOSE 146*   CALCIUM 8.2*         No results for input(s): ALKPHOS, PROT, LABALBU, BILITOT, AST, ALT in the last 72 hours. Recent Labs     09/21/22  0525   WBC 15.8*   RBC 3.27*   HGB 9.6*   HCT 28.1*   MCV 85.9   MCH 29.4   MCHC 34.2   RDW 14.9   *   MPV 9.5              Radiology:   VL LOWER EXTREMITY ARTERIAL SEGMENTAL PRESSURES W PPG BILATERAL   Final Result   In spite of normal bilateral ankle-brachial indices, Doppler and PVR   waveforms suggest at least mild, symmetric bilateral lower extremity arterial   stenosis. Correlation with CTA of the abdomen/pelvis with lower extremity   runoff could be considered for better assessment. XR FOOT LEFT (2 VIEWS)   Final Result   Marked soft tissue swelling in the dorsal aspect of the mid and distal foot.       Slight interval improvement of soft tissue ulceration in the heel and   cortical irregularity along the posterior margin of the calcaneus. Question hairline fracture, proximal phalanx of 5th toe. Please correlate   with focal tenderness. XR CHEST 1 VIEW   Final Result   No acute process. There are multiple old left-sided rib fractures. Assessment:  Principal Problem:    Sepsis due to group A beta-hemolytic Streptococcus ([address]  Active Problems:    Diabetes mellitus ([address]    Osteomyelitis of left lower extremity (HCC)    Alcohol abuse    Coronary artery disease involving native coronary artery of native heart without angina pectoris    Sepsis due to cellulitis (HCC)    Lactic acidosis    Uncontrolled type 2 diabetes mellitus with hyperglycemia (HCC)    Type 2 diabetes mellitus with left diabetic foot ulcer ([address]    Bacteremia due to Gram-positive bacteria    Hyponatremia    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 2 diabetes mellitus, with necrosis of bone ([address]  Resolved Problems:    * No resolved hospital problems. *      Plan:                 Sepsis due to cellulitis, left calcaneal osteomyelitis as well as Strep pyogenes bacteremia in patient with left diabetic foot ulcer  -Sepsis evidenced by leukocytosis (33.6), tachycardia (102) and cellulitis of the foot  -Podiatry and ID following  -Status post incision and drainage of left calcaneus and posterior left leg on 9/14. Underwent partial calcanectomy with debridement  9/21 by podiatry. [place] pain panel added for pain control postop has helped  -Blood cultures with strep pyogenes, wound cultures with strep pyogenes and staph aureus. Antibiotics narrowed to ceftriaxone 2 g IV daily and metronidazole 500 mg p.o. every 8 hours per ID; day #6. Prior to this completed 4 days of cefepime and 3 days of Vanco.  -Discussed that he will need long term IV antibiotics and ID plans 6 weeks. Repeat blood cultures show no growth. -SL PICC line placed 9/19 R brachial vein.      2.         Lactic acidosis(resolved)  -Due to the above but resolved with IV fluid hydration  -Lactic acid trend as follows 4.3-->4.1- >1.9     3. Type II diabetes mellitus  -Added Lantus 5 units nightly in addition to corrective scale with improved glycemic control      4. Hypertension/CAD  -Hydrochlorothiazide on hold given hyponatremia  -Holding Plavix prior to surgery 9/21. Will ask podiatry when okay to resume     5. Hyponatremia  -likely component of hypovolemia due to sepsis, however he has had this in the past due to alcohol abuse. States he has been drinking 6-8 beers a day lately  -Now that he has been volume resuscitated from sepsis standpoint, the fluids have been discontinued  -Sodium is correcting at an appropriate rate; remains 129 this morning. 6.       Alcohol abuse  -6-8 beers per day  -counseled on cessation   -no signs of withdrawal syndrome at this time, but will have low threshold for initiating CIWA protocol but appears to be out of window for this. Disposition: Patient is now agreeable to SNF or LTAC. At least several more days in the hospital for surgery on 9/21, postoperative care. He requested [facility] and they accepted. Precert initiated. NOTE: This report was transcribed using voice recognition software. Every effort was made to ensure accuracy; however, inadvertent computerized transcription errors may be present.      Electronically signed by Harriet Anderson MD on 9/23/2022 at 9:49 AM

## 2022-09-23 NOTE — PROGRESS NOTES
Department of Podiatry  Progress Note      SUBJECTIVE:  Mr. Caro Tapia was evaluated at bedside this morning S/P LLE I&D, Debridement (DOS: 9/14/22), S/p L partial calcanectomy with Integra graft application (DOS: 2/60). He is resting in bed comfortably, denies any complaints at this time. Dressing to LLE is c/d/intact without strikethrough drainage. OBJECTIVE:    Scheduled Meds:   heparin flush  100 Units IntraCATHeter BID    cefTRIAXone (ROCEPHIN) IV  2,000 mg IntraVENous Q24H    metroNIDAZOLE  500 mg Oral 3 times per day    sodium chloride flush  10 mL IntraVENous 2 times per day    insulin glargine  5 Units SubCUTAneous Nightly    gabapentin  300 mg Oral BID    [Held by provider] clopidogrel  75 mg Oral Daily    aspirin  81 mg Oral Daily    atorvastatin  40 mg Oral Nightly    cyclobenzaprine  10 mg Oral Nightly    insulin lispro  0-4 Units SubCUTAneous Q4H    enoxaparin  40 mg SubCUTAneous Daily     Continuous Infusions:   sodium chloride      dextrose      sodium chloride       PRN Meds:. HYDROcodone 5 mg - acetaminophen **OR** HYDROcodone 5 mg - acetaminophen, sodium chloride flush, sodium chloride, povidone-iodine, calcium carbonate, melatonin, diphenhydrAMINE, glucose, dextrose bolus **OR** dextrose bolus, glucagon (rDNA), dextrose, sodium chloride, promethazine **OR** ondansetron, polyethylene glycol, acetaminophen **OR** acetaminophen    Allergies   Allergen Reactions    Pcn [Penicillins] Anaphylaxis       BP (!) 140/66   Pulse 80   Temp 97.6 °F (36.4 °C) (Temporal)   Resp 18   Ht 5' 11\" (1.803 m)   Wt 192 lb (87.1 kg)   SpO2 97%   BMI 26.78 kg/m²               EXAM:  LLE focused exam: Dressing left CDI. No strikethrough drainage. NEUROLOGICAL EXAMINATION: Protective sensation absent. Gross sensation diminished. VASCULAR EXAMINATION:  DP and PT pulses are palpable. Capillary refill is WNL. Skin temperature is warm to warm from tibial tuberosity to distal digits. No hair growth present to LE. Mild bleeding with dressing change but within normal limits of post-op day #1 appearance. Mild LLE edema, consistent with POD#1 appearance. DERMATOLOGICAL:  Sutures in place to incision along posterior leg and heel; staples in place with Integra graft well adhered. No evidence of gapping or dehiscence. No fluctuance, malodor, crepitus, or purulence noted. Mild sanguinous drainage to removed dressings. Skin is diffusely dry. MUSCULOSKELETAL:  MMT Deferred. Equinus. No posterior calf pain on palpation. Evidence of partial calcanectomy. Scheduled Meds:   heparin flush  100 Units IntraCATHeter BID    cefTRIAXone (ROCEPHIN) IV  2,000 mg IntraVENous Q24H    metroNIDAZOLE  500 mg Oral 3 times per day    sodium chloride flush  10 mL IntraVENous 2 times per day    insulin glargine  5 Units SubCUTAneous Nightly    gabapentin  300 mg Oral BID    [Held by provider] clopidogrel  75 mg Oral Daily    aspirin  81 mg Oral Daily    atorvastatin  40 mg Oral Nightly    cyclobenzaprine  10 mg Oral Nightly    insulin lispro  0-4 Units SubCUTAneous Q4H    enoxaparin  40 mg SubCUTAneous Daily     Continuous Infusions:   sodium chloride      dextrose      sodium chloride       PRN Meds:. HYDROcodone 5 mg - acetaminophen **OR** HYDROcodone 5 mg - acetaminophen, sodium chloride flush, sodium chloride, povidone-iodine, calcium carbonate, melatonin, diphenhydrAMINE, glucose, dextrose bolus **OR** dextrose bolus, glucagon (rDNA), dextrose, sodium chloride, promethazine **OR** ondansetron, polyethylene glycol, acetaminophen **OR** acetaminophen    RADIOLOGY:  VL LOWER EXTREMITY ARTERIAL SEGMENTAL PRESSURES W PPG BILATERAL   Final Result   In spite of normal bilateral ankle-brachial indices, Doppler and PVR   waveforms suggest at least mild, symmetric bilateral lower extremity arterial   stenosis. Correlation with CTA of the abdomen/pelvis with lower extremity   runoff could be considered for better assessment. XR FOOT LEFT (2 VIEWS)   Final Result   Marked soft tissue swelling in the dorsal aspect of the mid and distal foot. Slight interval improvement of soft tissue ulceration in the heel and   cortical irregularity along the posterior margin of the calcaneus. Question hairline fracture, proximal phalanx of 5th toe. Please correlate   with focal tenderness. XR CHEST 1 VIEW   Final Result   No acute process. There are multiple old left-sided rib fractures. BP (!) 140/66   Pulse 80   Temp 97.6 °F (36.4 °C) (Temporal)   Resp 18   Ht 5' 11\" (1.803 m)   Wt 192 lb (87.1 kg)   SpO2 97%   BMI 26.78 kg/m²     LABS:    Recent Labs     09/21/22  0525 09/23/22  1030   WBC 15.8* 12.6*   HGB 9.6* 9.4*   HCT 28.1* 28.7*   * 492*        Recent Labs     09/23/22  1030   *   K 4.1   CL 97*   CO2 23   BUN 10   CREATININE 0.7        Recent Labs     09/23/22  1030   PROT 8.0         ASSESSMENT:  - S/P LLE I&D,Debridement (dos:9/14/22)  -Full-thickness ulcer down to level of exposed bone with exposed tendon left posterior Heel, POA, infected - s/p partial calcanectomy (DOS: 9/21)  -Necrotizing infection posterior Achilles. -Uncontrolled insulin dependent type 2 diabetes mellitus with peripheral neuropathy  -Acute on chronic osteomyelitis left posterior heel, POA  -Positive bacteremia Gram-positive cocci in chains  -Peripheral arterial disease s/p arteriogram with angioplasty      PLAN:  - Examined and evaluated  - All labs, imaging, and charts reviewed  - Antibiotics as per ID: Rocephin/Flagyl via PICC; PICC placed.  -Arterial studies 9/16/2022: MAHOGANY 1.0 right, 1.4 left.   Mild symmetrical bilateral arterial stenosis diminutive flow bilateral digits  -Wound cultures 9/13/2022: MSSA, group A strep, mixed GPO, mixed GNR  - OR cultures: Anaerobic GNR, Anaerobic GPCs  -Surgical path 9/14/2022: +OM of calcaneus  -X-ray left foot 9/13/22: Positive osteomyelitis posterior calcaneal tuber with

## 2022-09-24 LAB
ANION GAP SERPL CALCULATED.3IONS-SCNC: 10 MMOL/L (ref 7–16)
BASOPHILS ABSOLUTE: 0.12 E9/L (ref 0–0.2)
BASOPHILS RELATIVE PERCENT: 1 % (ref 0–2)
BUN BLDV-MCNC: 10 MG/DL (ref 6–23)
CALCIUM SERPL-MCNC: 8.6 MG/DL (ref 8.6–10.2)
CHLORIDE BLD-SCNC: 100 MMOL/L (ref 98–107)
CO2: 21 MMOL/L (ref 22–29)
CREAT SERPL-MCNC: 0.7 MG/DL (ref 0.7–1.2)
EOSINOPHILS ABSOLUTE: 0.33 E9/L (ref 0.05–0.5)
EOSINOPHILS RELATIVE PERCENT: 2.8 % (ref 0–6)
GFR AFRICAN AMERICAN: >60
GFR NON-AFRICAN AMERICAN: >60 ML/MIN/1.73
GLUCOSE BLD-MCNC: 144 MG/DL (ref 74–99)
HCT VFR BLD CALC: 29.5 % (ref 37–54)
HEMOGLOBIN: 9.7 G/DL (ref 12.5–16.5)
IMMATURE GRANULOCYTES #: 0.29 E9/L
IMMATURE GRANULOCYTES %: 2.5 % (ref 0–5)
LYMPHOCYTES ABSOLUTE: 1.72 E9/L (ref 1.5–4)
LYMPHOCYTES RELATIVE PERCENT: 14.7 % (ref 20–42)
MCH RBC QN AUTO: 29.2 PG (ref 26–35)
MCHC RBC AUTO-ENTMCNC: 32.9 % (ref 32–34.5)
MCV RBC AUTO: 88.9 FL (ref 80–99.9)
METER GLUCOSE: 112 MG/DL (ref 74–99)
METER GLUCOSE: 112 MG/DL (ref 74–99)
METER GLUCOSE: 121 MG/DL (ref 74–99)
METER GLUCOSE: 191 MG/DL (ref 74–99)
MONOCYTES ABSOLUTE: 0.81 E9/L (ref 0.1–0.95)
MONOCYTES RELATIVE PERCENT: 6.9 % (ref 2–12)
NEUTROPHILS ABSOLUTE: 8.47 E9/L (ref 1.8–7.3)
NEUTROPHILS RELATIVE PERCENT: 72.1 % (ref 43–80)
PDW BLD-RTO: 15.7 FL (ref 11.5–15)
PLATELET # BLD: 574 E9/L (ref 130–450)
PMV BLD AUTO: 9.9 FL (ref 7–12)
POTASSIUM SERPL-SCNC: 4.4 MMOL/L (ref 3.5–5)
RBC # BLD: 3.32 E12/L (ref 3.8–5.8)
SODIUM BLD-SCNC: 131 MMOL/L (ref 132–146)
WBC # BLD: 11.7 E9/L (ref 4.5–11.5)

## 2022-09-24 PROCEDURE — 6370000000 HC RX 637 (ALT 250 FOR IP): Performed by: STUDENT IN AN ORGANIZED HEALTH CARE EDUCATION/TRAINING PROGRAM

## 2022-09-24 PROCEDURE — 1200000000 HC SEMI PRIVATE

## 2022-09-24 PROCEDURE — 36415 COLL VENOUS BLD VENIPUNCTURE: CPT

## 2022-09-24 PROCEDURE — 6370000000 HC RX 637 (ALT 250 FOR IP): Performed by: INTERNAL MEDICINE

## 2022-09-24 PROCEDURE — 85025 COMPLETE CBC W/AUTO DIFF WBC: CPT

## 2022-09-24 PROCEDURE — 82962 GLUCOSE BLOOD TEST: CPT

## 2022-09-24 PROCEDURE — 6370000000 HC RX 637 (ALT 250 FOR IP): Performed by: SPECIALIST

## 2022-09-24 PROCEDURE — 6360000002 HC RX W HCPCS: Performed by: INTERNAL MEDICINE

## 2022-09-24 PROCEDURE — 36592 COLLECT BLOOD FROM PICC: CPT

## 2022-09-24 PROCEDURE — 6360000002 HC RX W HCPCS: Performed by: STUDENT IN AN ORGANIZED HEALTH CARE EDUCATION/TRAINING PROGRAM

## 2022-09-24 PROCEDURE — 2580000003 HC RX 258: Performed by: SPECIALIST

## 2022-09-24 PROCEDURE — 80048 BASIC METABOLIC PNL TOTAL CA: CPT

## 2022-09-24 PROCEDURE — 99232 SBSQ HOSP IP/OBS MODERATE 35: CPT | Performed by: INTERNAL MEDICINE

## 2022-09-24 PROCEDURE — 6360000002 HC RX W HCPCS: Performed by: SPECIALIST

## 2022-09-24 RX ADMIN — HYDROCODONE BITARTRATE AND ACETAMINOPHEN 2 TABLET: 5; 325 TABLET ORAL at 10:44

## 2022-09-24 RX ADMIN — Medication 100 UNITS: at 08:22

## 2022-09-24 RX ADMIN — HYDROCODONE BITARTRATE AND ACETAMINOPHEN 2 TABLET: 5; 325 TABLET ORAL at 18:28

## 2022-09-24 RX ADMIN — ENOXAPARIN SODIUM 40 MG: 100 INJECTION SUBCUTANEOUS at 08:24

## 2022-09-24 RX ADMIN — GABAPENTIN 300 MG: 300 CAPSULE ORAL at 20:10

## 2022-09-24 RX ADMIN — Medication 100 UNITS: at 20:10

## 2022-09-24 RX ADMIN — ASPIRIN 81 MG: 81 TABLET, COATED ORAL at 08:21

## 2022-09-24 RX ADMIN — METRONIDAZOLE 500 MG: 500 TABLET ORAL at 20:10

## 2022-09-24 RX ADMIN — Medication 10 ML: at 22:13

## 2022-09-24 RX ADMIN — HYDROCODONE BITARTRATE AND ACETAMINOPHEN 2 TABLET: 5; 325 TABLET ORAL at 14:16

## 2022-09-24 RX ADMIN — GABAPENTIN 300 MG: 300 CAPSULE ORAL at 08:21

## 2022-09-24 RX ADMIN — CYCLOBENZAPRINE HYDROCHLORIDE 10 MG: 5 TABLET, FILM COATED ORAL at 20:10

## 2022-09-24 RX ADMIN — ATORVASTATIN CALCIUM 40 MG: 40 TABLET, FILM COATED ORAL at 20:10

## 2022-09-24 RX ADMIN — CLOPIDOGREL BISULFATE 75 MG: 75 TABLET ORAL at 08:21

## 2022-09-24 RX ADMIN — METRONIDAZOLE 500 MG: 500 TABLET ORAL at 06:37

## 2022-09-24 RX ADMIN — METRONIDAZOLE 500 MG: 500 TABLET ORAL at 14:16

## 2022-09-24 RX ADMIN — INSULIN GLARGINE 5 UNITS: 100 INJECTION, SOLUTION SUBCUTANEOUS at 20:11

## 2022-09-24 RX ADMIN — Medication 10 ML: at 08:25

## 2022-09-24 RX ADMIN — WATER 2000 MG: 1 INJECTION INTRAMUSCULAR; INTRAVENOUS; SUBCUTANEOUS at 14:16

## 2022-09-24 RX ADMIN — HYDROCODONE BITARTRATE AND ACETAMINOPHEN 2 TABLET: 5; 325 TABLET ORAL at 06:36

## 2022-09-24 RX ADMIN — HYDROCODONE BITARTRATE AND ACETAMINOPHEN 2 TABLET: 5; 325 TABLET ORAL at 22:35

## 2022-09-24 RX ADMIN — Medication 9 MG: at 20:10

## 2022-09-24 ASSESSMENT — PAIN DESCRIPTION - ORIENTATION
ORIENTATION: LEFT
ORIENTATION: LEFT

## 2022-09-24 ASSESSMENT — PAIN DESCRIPTION - LOCATION
LOCATION: LEG

## 2022-09-24 ASSESSMENT — PAIN SCALES - GENERAL
PAINLEVEL_OUTOF10: 8
PAINLEVEL_OUTOF10: 7
PAINLEVEL_OUTOF10: 8
PAINLEVEL_OUTOF10: 0
PAINLEVEL_OUTOF10: 8
PAINLEVEL_OUTOF10: 7
PAINLEVEL_OUTOF10: 8

## 2022-09-24 ASSESSMENT — PAIN DESCRIPTION - DESCRIPTORS: DESCRIPTORS: ACHING;NAGGING;SORE

## 2022-09-24 NOTE — PROGRESS NOTES
chloride infusion, 25 mL, IntraVENous, PRN, Nella Wang MD    insulin glargine (LANTUS) injection vial 5 Units, 5 Units, SubCUTAneous, Nightly, Lamont Wells DO, 5 Units at 09/23/22 2152    povidone-iodine (BETADINE) 10 % external solution, , Topical, PRN, Alex Hernandez MD    calcium carbonate (TUMS) chewable tablet 500 mg, 500 mg, Oral, TID PRN, Tierney Canales, , 500 mg at 09/20/22 2046    gabapentin (NEURONTIN) capsule 300 mg, 300 mg, Oral, BID, Yeison Ahammad, DPM, 300 mg at 09/24/22 9760    clopidogrel (PLAVIX) tablet 75 mg, 75 mg, Oral, Daily, Yeison Ahammad, DPM, 75 mg at 09/24/22 2889    aspirin EC tablet 81 mg, 81 mg, Oral, Daily, Yeison Ahammad, DPM, 81 mg at 09/24/22 0821    melatonin tablet 9 mg, 9 mg, Oral, Nightly PRN, Yeison Ahammad, DPM, 9 mg at 09/21/22 2215    diphenhydrAMINE (BENADRYL) tablet 25 mg, 25 mg, Oral, Q6H PRN, Yeison Ahammad, DPM, 25 mg at 09/17/22 2239    atorvastatin (LIPITOR) tablet 40 mg, 40 mg, Oral, Nightly, Yeison Ahammad, DPM, 40 mg at 09/23/22 2150    cyclobenzaprine (FLEXERIL) tablet 10 mg, 10 mg, Oral, Nightly, Yeison Ahammad, DPM, 10 mg at 09/23/22 2146    glucose chewable tablet 16 g, 4 tablet, Oral, PRN, Yeison Ahammad, DPM    dextrose bolus 10% 125 mL, 125 mL, IntraVENous, PRN **OR** dextrose bolus 10% 250 mL, 250 mL, IntraVENous, PRN, Yeison Ahammad, DPM    glucagon (rDNA) injection 1 mg, 1 mg, SubCUTAneous, PRN, Yeison Ahammad, DPM    dextrose 10 % infusion, , IntraVENous, Continuous PRN, Yeison Ahammad, DPM    insulin lispro (HUMALOG) injection vial 0-4 Units, 0-4 Units, SubCUTAneous, Q4H, Yeison Ahammad, DPM, 1 Units at 09/20/22 2052    0.9 % sodium chloride infusion, , IntraVENous, PRN, Yeison Ahammad, DPM    enoxaparin (LOVENOX) injection 40 mg, 40 mg, SubCUTAneous, Daily, Yeison Flanagan, DPM, 40 mg at 09/24/22 0824    promethazine (PHENERGAN) tablet 12.5 mg, 12.5 mg, Oral, Q6H PRN **OR** ondansetron (ZOFRAN) injection 4 mg, 4 mg, IntraVENous, Q6H PRN, Regino Ferrarare, DPM, 4 mg at 09/15/22 1652    polyethylene glycol (GLYCOLAX) packet 17 g, 17 g, Oral, Daily PRN, Yeison Ahammad, DPM    acetaminophen (TYLENOL) tablet 650 mg, 650 mg, Oral, Q6H PRN **OR** acetaminophen (TYLENOL) suppository 650 mg, 650 mg, Rectal, Q6H PRN, Yeison Ahammad, DPM  PHYSICAL EXAM     /60   Pulse 74   Temp 98 °F (36.7 °C) (Infrared)   Resp 18   Ht 5' 11\" (1.803 m)   Wt 192 lb (87.1 kg)   SpO2 95%   BMI 26.78 kg/m²   Temp  Av °F (36.7 °C)  Min: 98 °F (36.7 °C)  Max: 98 °F (36.7 °C)  Constitutional:  The patient is awake, alert     Skin:    Warm and dry. No rash   HEENT:     AT/NC anicetric   Chest:   CTA ant  On RA symmetrical   Cardiovascular:  S1 and S2 are rhythmic and regular. Abdomen:    BS Benign to palpation. DISTENDED  Extremities:    LEFT LE ACE       CNS    Awake nad      Right arm with PICC  Peripherally Inserted Central Catheter:  PICC Single Lumen 22 Right Brachial (Active)   Central Line Being Utilized Yes 22 0932   Criteria for Appropriate Use Long term IV/antibiotic administration 22 0932   Site Assessment Clean;Dry; Intact 22 1036   Phlebitis Assessment No symptoms 2232   Infiltration Assessment 0 22 0932   Lumen Color/Status Pink;Flushed 22 1036   Extremity Circumference (cm) 32 cm 22 0932   Alcohol Cap Used Yes 22 0932   Date of Last Dressing Change 22 0932   Dressing Type Transparent; Antimicrobial;Securing device 22 1036   Dressing Status Clean, dry & intact 22 1036        DIAGNOSTIC RESULTS   Radiology:    Recent Labs     22  1030 22  0640   WBC 12.6* 11.7*   RBC 3.26* 3.32*   HGB 9.4* 9.7*   HCT 28.7* 29.5*   MCV 88.0 88.9   MCH 28.8 29.2   MCHC 32.8 32.9   RDW 15.2* 15.7*   * 574*   MPV 9.2 9.9       Recent Labs     22  1030 22  0640   * 131*   K 4.1 4.4   CL 97* 100   CO2 23 21*   BUN 10 10   CREATININE 0.7 0.7   GLUCOSE 112* 144*   PROT 8.0  --    LABALBU 2.8*  --    CALCIUM 8.3* 8.6   BILITOT 0.5  --    ALKPHOS 87  --    AST 21  --    ALT 14  --        Lab Results   Component Value Date    CRP 24.9 (H) 09/13/2022    CRP 2.3 (H) 02/14/2021     Lab Results   Component Value Date    SEDRATE 125 (H) 09/23/2022    SEDRATE 90 (H) 09/13/2022    SEDRATE 87 (H) 02/14/2021     Recent Labs     09/23/22  1030   AST 21   ALT 14       Lab Results   Component Value Date/Time    CHOL 164 03/01/2016 08:10 AM    TRIG 170 03/01/2016 08:10 AM    HDL 43 03/01/2016 08:10 AM    LDLCALC 87 03/01/2016 08:10 AM    LABVLDL 34 03/01/2016 08:10 AM     Lab Results   Component Value Date/Time    VITD25 12 (L) 12/28/2020 09:15 AM       Microbiology:   No results for input(s): COVID19 in the last 72 hours. Lab Results   Component Value Date/Time    BC 5 Days no growth 09/15/2022 07:24 AM    BC 5 Days no growth 02/16/2021 04:37 PM    BC Previously positive blood culture called 02/14/2021 07:07 PM    BC  02/14/2021 07:07 PM     Refer to previous culture for susceptibility results  of CXBL2 (LAC) collected 02/14/2021 at 19:07      BLOODCULT2 5 Days no growth 09/15/2022 07:25 AM    BLOODCULT2 Previously positive blood culture called 09/13/2022 02:59 PM    BLOODCULT2  09/13/2022 02:59 PM     Refer to previous culture CXBL 9/13/2022 1459 for susceptibility results    ORG Anaerobic gram negative justo 09/14/2022 04:41 PM    ORG Anaerobic gram positive cocci 09/14/2022 04:41 PM    ORG BHS Group A (Strep pyogenes) 09/13/2022 02:59 PM    ORG BHS Group A (Strep pyogenes) 09/13/2022 02:59 PM    ORG Staphylococcus aureus 09/13/2022 02:59 PM    ORG Strep pyogenes (Beta Strep Group A) 09/13/2022 02:59 PM     WOUND/ABSCESS   Date Value Ref Range Status   09/13/2022 (A)  Final    Mixed laura isolated. Further workup and sensitivity testing  is not routinely indicated and will not be performed.   Mixed laura isolated includes:  Mixed Gram negative rods  Gram positive rods catalase negative     09/13/2022   Final    Heavy growth  This isolate is presumed to be resistant based on the  detection of inducible Clindamycin resistance. Clindamycin  may still be effective in some patients. 09/13/2022 Heavy growth  Final     Smear, Respiratory   Date Value Ref Range Status   01/01/2021   Final    Group 6: <25 PMN's/LPF and <25 Epithelial cells/LPF  Rare Polymorphonuclear leukocytes  Epithelial cells not seen  No organisms seen           Component Value Date/Time    AFBCX No growth after 6 weeks of incubation. 02/16/2021 0732    AFBCX No growth after 6 weeks of incubation. 02/16/2021 0730    FUNGSM No Fungal elements seen 09/14/2022 1641    LABFUNG No growth after 4 weeks of incubation. 02/16/2021 0732    LABFUNG No growth after 4 weeks of incubation. 02/16/2021 0730       MRSA Culture Only   Date Value Ref Range Status   12/26/2020 Methicillin resistant Staph aureus not isolated  Final     CULTURE, RESPIRATORY   Date Value Ref Range Status   01/01/2021 Oral Pharyngeal Hannah absent  Growth not present    Final     FINAL IMPRESSION    Pt had   Chief Complaint   Patient presents with    Fatigue     Onset several days-hx left foot diabetic wound    Admitted for Lactic acidosis [E87.2]  S/p SEPSIS  -Streptococcus species SEPTICEMIA 9/13  Foot cx MSSA/Strep   -LEUKOCYTOSIS trending down   -LEFT LE CELLULITIS  -LEFT HEEL OM   Leukocytosis  trending down   9/14 1. Incision and drainage, left calcaneus bone. 2.  Incision and drainage posterior left leg.     9/21 LEFT FOOT PARTIAL CALCANECTOMY AND DEBRIDEMENT      Plan:      cefTRIAXone (ROCEPHIN) 2,000 mg in sterile water 20 mL IV syringe, Q24H  metroNIDAZOLE (FLAGYL) tablet 500 mg, 3 times per day       sed rate- 125   Continue wound care per podiatry  Can d/c- awaiting precert  to  [employer] rec done from ID POV     No new issues  Imaging and labs were reviewed.     Stacy Martino was informed of their diagnosis, indications, risks and benefits of treatment. [facility] had the opportunity to ask questions. All questions were answered. Thank you for involving me in the care of [facility]. Please do not hesitate to call for any questions or concerns.     Electronically signed by Li Jackson MD on 9/24/2022 at 6:07 PM

## 2022-09-24 NOTE — PROGRESS NOTES
[address] [facility]   Progress Note    Admitting Date and Time: 9/13/2022 12:00 PM  Admit Dx: Lactic acidosis [E87.2]  Septicemia ([address] [A41.9]  Wound infection [T14. 8XXA, L08.9]  Wound cellulitis [L03.90]  Sepsis ([address] [A41.9]    Subjective:    9/18: Pt  sitting up in bed in no acute distress. States feels well today. For procedure in am. ? PICC line placement. Denies any new concerns at this time. 9/19: Pt somewhat frustrated that he was NPO most of the day and then procedure got cancelled. Per RN, urgery got postponed due to scheduling issues for OR time. He was placed on schedule for tomorrow. PICC line placed this morning by IV team.    9/20: Pt  is hopeful he can get surgery tomorrow. 9/21: Pt is sitting up in bed. He had surgery today and is having pain. He was asking for something for pain more than Tylenol. After questioning, he had tolerated Vicodin in past.     9/22: Pt  states pain controlled. He is frustrated as he just spilled his urinal on floor. 9/23: Pt in better spirits today.  He is watching [employer] football     heparin flush  100 Units IntraCATHeter BID    cefTRIAXone (ROCEPHIN) IV  2,000 mg IntraVENous Q24H    metroNIDAZOLE  500 mg Oral 3 times per day    sodium chloride flush  10 mL IntraVENous 2 times per day    insulin glargine  5 Units SubCUTAneous Nightly    gabapentin  300 mg Oral BID    clopidogrel  75 mg Oral Daily    aspirin  81 mg Oral Daily    atorvastatin  40 mg Oral Nightly    cyclobenzaprine  10 mg Oral Nightly    insulin lispro  0-4 Units SubCUTAneous Q4H    enoxaparin  40 mg SubCUTAneous Daily     HYDROcodone 5 mg - acetaminophen, 1 tablet, Q4H PRN   Or  HYDROcodone 5 mg - acetaminophen, 2 tablet, Q4H PRN  sodium chloride flush, 10 mL, PRN  sodium chloride, 25 mL, PRN  povidone-iodine, , PRN  calcium carbonate, 500 mg, TID PRN  melatonin, 9 mg, Nightly PRN  diphenhydrAMINE, 25 mg, Q6H PRN  glucose, 4 tablet, PRN  dextrose bolus, 125 mL, PRN Or  dextrose bolus, 250 mL, PRN  glucagon (rDNA), 1 mg, PRN  dextrose, , Continuous PRN  sodium chloride, , PRN  promethazine, 12.5 mg, Q6H PRN   Or  ondansetron, 4 mg, Q6H PRN  polyethylene glycol, 17 g, Daily PRN  acetaminophen, 650 mg, Q6H PRN   Or  acetaminophen, 650 mg, Q6H PRN       Objective:    /60   Pulse 74   Temp 98 °F (36.7 °C) (Infrared)   Resp 18   Ht 5' 11\" (1.803 m)   Wt 192 lb (87.1 kg)   SpO2 95%   BMI 26.78 kg/m²   General Appearance: alert and oriented to person, place and time and in no acute distress  Skin: warm and dry  Head: normocephalic and atraumatic  Eyes: pupils equal, round, and reactive to light, extraocular eye movements intact, conjunctivae normal  Neck: neck supple and non tender without mass   Pulmonary/Chest: clear to auscultation bilaterally- no wheezes, rales or rhonchi, normal air movement, no respiratory distress  Cardiovascular: normal rate, normal S1 and S2 and no RGM  Abdomen: soft, non-tender, non-distended, normal bowel sounds  Extremities: no cyanosis, no clubbing and no edema. LLE Surgical dressing CDI. Neurologic: no cranial nerve deficit and speech normal      Recent Labs     09/23/22  1030 09/24/22  0640   * 131*   K 4.1 4.4   CL 97* 100   CO2 23 21*   BUN 10 10   CREATININE 0.7 0.7   GLUCOSE 112* 144*   CALCIUM 8.3* 8.6         Recent Labs     09/23/22  1030   ALKPHOS 87   PROT 8.0   LABALBU 2.8*   BILITOT 0.5   AST 21   ALT 14         Recent Labs     09/23/22  1030 09/24/22  0640   WBC 12.6* 11.7*   RBC 3.26* 3.32*   HGB 9.4* 9.7*   HCT 28.7* 29.5*   MCV 88.0 88.9   MCH 28.8 29.2   MCHC 32.8 32.9   RDW 15.2* 15.7*   * 574*   MPV 9.2 9.9              Radiology:   VL LOWER EXTREMITY ARTERIAL SEGMENTAL PRESSURES W PPG BILATERAL   Final Result   In spite of normal bilateral ankle-brachial indices, Doppler and PVR   waveforms suggest at least mild, symmetric bilateral lower extremity arterial   stenosis.   Correlation with CTA of the abdomen/pelvis with lower extremity   runoff could be considered for better assessment. XR FOOT LEFT (2 VIEWS)   Final Result   Marked soft tissue swelling in the dorsal aspect of the mid and distal foot. Slight interval improvement of soft tissue ulceration in the heel and   cortical irregularity along the posterior margin of the calcaneus. Question hairline fracture, proximal phalanx of 5th toe. Please correlate   with focal tenderness. XR CHEST 1 VIEW   Final Result   No acute process. There are multiple old left-sided rib fractures. Assessment:  Principal Problem:    Sepsis due to group A beta-hemolytic Streptococcus ([address]  Active Problems:    Diabetes mellitus ([address]    Osteomyelitis of left lower extremity (HCC)    Alcohol abuse    Coronary artery disease involving native coronary artery of native heart without angina pectoris    Sepsis due to cellulitis (HCC)    Lactic acidosis    Uncontrolled type 2 diabetes mellitus with hyperglycemia (HCC)    Type 2 diabetes mellitus with left diabetic foot ulcer ([address]    Bacteremia due to Gram-positive bacteria    Hyponatremia    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 2 diabetes mellitus, with necrosis of bone ([address]  Resolved Problems:    * No resolved hospital problems. *      Plan:                 Sepsis due to cellulitis, left calcaneal osteomyelitis as well as Strep pyogenes bacteremia in patient with left diabetic foot ulcer  -Sepsis evidenced by leukocytosis (33.6), tachycardia (102) and cellulitis of the foot  -Podiatry and ID following  -Status post incision and drainage of left calcaneus and posterior left leg on 9/14. Underwent partial calcanectomy with debridement  9/21 by podiatry. [place] pain panel added for pain control postop has helped  -Blood cultures with strep pyogenes, wound cultures with strep pyogenes and staph aureus. Antibiotics narrowed to ceftriaxone 2 g IV daily and metronidazole 500 mg p.o. every 8 hours per ID; day #8. Prior to this completed 4 days of cefepime and 3 days of Vanco.  -Discussed that he will need long term IV antibiotics and ID plans 6 weeks. Repeat blood cultures show no growth. -SL PICC line placed 9/19 R brachial vein. 2.         Lactic acidosis(resolved)  -Due to the above but resolved with IV fluid hydration  -Lactic acid trend as follows 4.3-->4.1- >1.9     3. Type II diabetes mellitus  -Added Lantus 5 units nightly in addition to corrective scale with improved glycemic control      4. Hypertension/CAD  -Hydrochlorothiazide on hold given hyponatremia  -Holding Plavix prior to surgery 9/21. Friday, podiatry okayed resumption of Plavix and this was started this morning. 5.        Hyponatremia  -likely component of hypovolemia due to sepsis, however he has had this in the past due to alcohol abuse. States he has been drinking 6-8 beers a day lately  -Now that he has been volume resuscitated from sepsis standpoint, the fluids have been discontinued  -Sodium is correcting at an appropriate rate; Na had been stable 129 but yesterday down to 127. This morning Na improved to 131.     6.       Alcohol abuse  -6-8 beers per day  -counseled on cessation   -no signs of withdrawal syndrome at this time, but will have low threshold for initiating CIWA protocol but appears to be out of window for this. Disposition: Patient is now agreeable to SNF or LTAC. At least several more days in the hospital for surgery on 9/21, postoperative care. He requested [facility] and they accepted. Precert initiated. NOTE: This report was transcribed using voice recognition software. Every effort was made to ensure accuracy; however, inadvertent computerized transcription errors may be present.      Electronically signed by Megan Godinez MD on 9/24/2022 at 3:26 PM

## 2022-09-24 NOTE — PROGRESS NOTES
Skin temperature is warm to warm from tibial tuberosity to distal digits. No hair growth present to LE. Mild bleeding with dressing change but within normal limits of post-op day #1 appearance. Mild LLE edema, consistent with POD#1 appearance. DERM:  Sutures in place to incision along posterior leg and heel; staples in place with Integra graft well adhered. No evidence of gapping or dehiscence. No fluctuance, malodor, crepitus, or purulence noted. Mild sanguinous drainage to removed dressings. Skin is diffusely dry. MSK:  MMT Deferred. Equinus. No posterior calf pain on palpation. Evidence of partial calcanectomy. Scheduled Meds:   heparin flush  100 Units IntraCATHeter BID    cefTRIAXone (ROCEPHIN) IV  2,000 mg IntraVENous Q24H    metroNIDAZOLE  500 mg Oral 3 times per day    sodium chloride flush  10 mL IntraVENous 2 times per day    insulin glargine  5 Units SubCUTAneous Nightly    gabapentin  300 mg Oral BID    clopidogrel  75 mg Oral Daily    aspirin  81 mg Oral Daily    atorvastatin  40 mg Oral Nightly    cyclobenzaprine  10 mg Oral Nightly    insulin lispro  0-4 Units SubCUTAneous Q4H    enoxaparin  40 mg SubCUTAneous Daily     Continuous Infusions:   sodium chloride      dextrose      sodium chloride       PRN Meds:. HYDROcodone 5 mg - acetaminophen **OR** HYDROcodone 5 mg - acetaminophen, sodium chloride flush, sodium chloride, povidone-iodine, calcium carbonate, melatonin, diphenhydrAMINE, glucose, dextrose bolus **OR** dextrose bolus, glucagon (rDNA), dextrose, sodium chloride, promethazine **OR** ondansetron, polyethylene glycol, acetaminophen **OR** acetaminophen    RADIOLOGY:  VL LOWER EXTREMITY ARTERIAL SEGMENTAL PRESSURES W PPG BILATERAL   Final Result   In spite of normal bilateral ankle-brachial indices, Doppler and PVR   waveforms suggest at least mild, symmetric bilateral lower extremity arterial   stenosis.   Correlation with CTA of the abdomen/pelvis with lower extremity runoff could be considered for better assessment. XR FOOT LEFT (2 VIEWS)   Final Result   Marked soft tissue swelling in the dorsal aspect of the mid and distal foot. Slight interval improvement of soft tissue ulceration in the heel and   cortical irregularity along the posterior margin of the calcaneus. Question hairline fracture, proximal phalanx of 5th toe. Please correlate   with focal tenderness. XR CHEST 1 VIEW   Final Result   No acute process. There are multiple old left-sided rib fractures. /60   Pulse 74   Temp 98 °F (36.7 °C) (Infrared)   Resp 18   Ht 5' 11\" (1.803 m)   Wt 192 lb (87.1 kg)   SpO2 95%   BMI 26.78 kg/m²     LABS:    Recent Labs     09/23/22  1030 09/24/22  0640   WBC 12.6* 11.7*   HGB 9.4* 9.7*   HCT 28.7* 29.5*   * 574*        Recent Labs     09/24/22  0640   *   K 4.4      CO2 21*   BUN 10   CREATININE 0.7        Recent Labs     09/23/22  1030   PROT 8.0         ASSESSMENT:  - S/P LLE I&D,Debridement (dos:9/14/22)  -Full-thickness ulcer down to level of exposed bone with exposed tendon left posterior Heel, POA, infected - s/p partial calcanectomy (DOS: 9/21)  -Necrotizing infection posterior Achilles. -Uncontrolled insulin dependent type 2 diabetes mellitus with peripheral neuropathy  -Acute on chronic osteomyelitis left posterior heel, POA  -Positive bacteremia Gram-positive cocci in chains  -Peripheral arterial disease s/p arteriogram with angioplasty      PLAN:  - Examined and evaluated. All labs, imaging, and charts reviewed  - Antibiotics as per ID: Rocephin/Flagyl via PICC; PICC placed.  -Arterial studies 9/16/2022: MAHOGANY 1.0 right, 1.4 left.   Mild symmetrical bilateral arterial stenosis diminutive flow bilateral digits  -Wound cultures 9/13/2022: MSSA, group A strep, mixed GPO, mixed GNR  -OR cultures: Anaerobic GNR, Anaerobic GPCs  -Surgical path 9/14/2022: +OM of calcaneus  -X-ray left foot 9/13/22: Positive osteomyelitis posterior calcaneal tuber with marked soft tissue swelling dorsal aspect of mid and distal forefoot. Questionable hairline fracture proximal phalanx of digit.  - NWB to LLE  - Dressing changed today with Xeroform, DSD. QoD dressing changes.  Next change 9/26  - Maintain heel offloading  - D/C planning to [facility] possibly tomorrow, precert pending  - Follow up with Dr. Danyle Kemp 1 week after D/C  - Discussed pt with Dr. Jalen Herrera DPM  PGY2  9/24/2022  3:55 PM

## 2022-09-25 LAB
ALBUMIN SERPL-MCNC: 2.4 G/DL (ref 3.5–5.2)
ALP BLD-CCNC: 85 U/L (ref 40–129)
ALT SERPL-CCNC: 12 U/L (ref 0–40)
ANION GAP SERPL CALCULATED.3IONS-SCNC: 9 MMOL/L (ref 7–16)
AST SERPL-CCNC: 21 U/L (ref 0–39)
BASOPHILS ABSOLUTE: 0.09 E9/L (ref 0–0.2)
BASOPHILS RELATIVE PERCENT: 0.8 % (ref 0–2)
BILIRUB SERPL-MCNC: 0.3 MG/DL (ref 0–1.2)
BUN BLDV-MCNC: 11 MG/DL (ref 6–23)
CALCIUM SERPL-MCNC: 8.6 MG/DL (ref 8.6–10.2)
CHLORIDE BLD-SCNC: 101 MMOL/L (ref 98–107)
CO2: 21 MMOL/L (ref 22–29)
CREAT SERPL-MCNC: 0.7 MG/DL (ref 0.7–1.2)
EOSINOPHILS ABSOLUTE: 0.32 E9/L (ref 0.05–0.5)
EOSINOPHILS RELATIVE PERCENT: 2.8 % (ref 0–6)
GFR AFRICAN AMERICAN: >60
GFR NON-AFRICAN AMERICAN: >60 ML/MIN/1.73
GLUCOSE BLD-MCNC: 121 MG/DL (ref 74–99)
HCT VFR BLD CALC: 28.8 % (ref 37–54)
HEMOGLOBIN: 9.3 G/DL (ref 12.5–16.5)
IMMATURE GRANULOCYTES #: 0.17 E9/L
IMMATURE GRANULOCYTES %: 1.5 % (ref 0–5)
LYMPHOCYTES ABSOLUTE: 1.42 E9/L (ref 1.5–4)
LYMPHOCYTES RELATIVE PERCENT: 12.6 % (ref 20–42)
MCH RBC QN AUTO: 28.5 PG (ref 26–35)
MCHC RBC AUTO-ENTMCNC: 32.3 % (ref 32–34.5)
MCV RBC AUTO: 88.3 FL (ref 80–99.9)
METER GLUCOSE: 116 MG/DL (ref 74–99)
METER GLUCOSE: 125 MG/DL (ref 74–99)
METER GLUCOSE: 144 MG/DL (ref 74–99)
METER GLUCOSE: 203 MG/DL (ref 74–99)
MONOCYTES ABSOLUTE: 0.73 E9/L (ref 0.1–0.95)
MONOCYTES RELATIVE PERCENT: 6.5 % (ref 2–12)
NEUTROPHILS ABSOLUTE: 8.57 E9/L (ref 1.8–7.3)
NEUTROPHILS RELATIVE PERCENT: 75.8 % (ref 43–80)
PDW BLD-RTO: 15.4 FL (ref 11.5–15)
PLATELET # BLD: 550 E9/L (ref 130–450)
PMV BLD AUTO: 9.3 FL (ref 7–12)
POTASSIUM SERPL-SCNC: 4.5 MMOL/L (ref 3.5–5)
RBC # BLD: 3.26 E12/L (ref 3.8–5.8)
SODIUM BLD-SCNC: 131 MMOL/L (ref 132–146)
TOTAL PROTEIN: 8 G/DL (ref 6.4–8.3)
WBC # BLD: 11.3 E9/L (ref 4.5–11.5)

## 2022-09-25 PROCEDURE — 82962 GLUCOSE BLOOD TEST: CPT

## 2022-09-25 PROCEDURE — 6370000000 HC RX 637 (ALT 250 FOR IP): Performed by: STUDENT IN AN ORGANIZED HEALTH CARE EDUCATION/TRAINING PROGRAM

## 2022-09-25 PROCEDURE — 1200000000 HC SEMI PRIVATE

## 2022-09-25 PROCEDURE — 6370000000 HC RX 637 (ALT 250 FOR IP): Performed by: INTERNAL MEDICINE

## 2022-09-25 PROCEDURE — 36415 COLL VENOUS BLD VENIPUNCTURE: CPT

## 2022-09-25 PROCEDURE — 2580000003 HC RX 258: Performed by: SPECIALIST

## 2022-09-25 PROCEDURE — 80053 COMPREHEN METABOLIC PANEL: CPT

## 2022-09-25 PROCEDURE — 85025 COMPLETE CBC W/AUTO DIFF WBC: CPT

## 2022-09-25 PROCEDURE — 6360000002 HC RX W HCPCS: Performed by: STUDENT IN AN ORGANIZED HEALTH CARE EDUCATION/TRAINING PROGRAM

## 2022-09-25 PROCEDURE — 6360000002 HC RX W HCPCS: Performed by: INTERNAL MEDICINE

## 2022-09-25 PROCEDURE — 6360000002 HC RX W HCPCS: Performed by: SPECIALIST

## 2022-09-25 PROCEDURE — 6370000000 HC RX 637 (ALT 250 FOR IP): Performed by: SPECIALIST

## 2022-09-25 PROCEDURE — 2500000003 HC RX 250 WO HCPCS: Performed by: INTERNAL MEDICINE

## 2022-09-25 PROCEDURE — 99232 SBSQ HOSP IP/OBS MODERATE 35: CPT | Performed by: INTERNAL MEDICINE

## 2022-09-25 RX ORDER — OXYCODONE AND ACETAMINOPHEN 10; 325 MG/1; MG/1
1 TABLET ORAL EVERY 6 HOURS PRN
Qty: 12 TABLET | Refills: 0 | Status: SHIPPED | OUTPATIENT
Start: 2022-09-25 | End: 2022-09-28

## 2022-09-25 RX ADMIN — ANTI-FUNGAL POWDER MICONAZOLE NITRATE TALC FREE: 1.42 POWDER TOPICAL at 14:10

## 2022-09-25 RX ADMIN — HYDROCODONE BITARTRATE AND ACETAMINOPHEN 2 TABLET: 5; 325 TABLET ORAL at 06:03

## 2022-09-25 RX ADMIN — HYDROCODONE BITARTRATE AND ACETAMINOPHEN 2 TABLET: 5; 325 TABLET ORAL at 22:12

## 2022-09-25 RX ADMIN — GABAPENTIN 300 MG: 300 CAPSULE ORAL at 19:51

## 2022-09-25 RX ADMIN — Medication 10 ML: at 19:50

## 2022-09-25 RX ADMIN — CLOPIDOGREL BISULFATE 75 MG: 75 TABLET ORAL at 08:13

## 2022-09-25 RX ADMIN — ATORVASTATIN CALCIUM 40 MG: 40 TABLET, FILM COATED ORAL at 19:51

## 2022-09-25 RX ADMIN — INSULIN GLARGINE 5 UNITS: 100 INJECTION, SOLUTION SUBCUTANEOUS at 19:54

## 2022-09-25 RX ADMIN — HYDROCODONE BITARTRATE AND ACETAMINOPHEN 2 TABLET: 5; 325 TABLET ORAL at 10:14

## 2022-09-25 RX ADMIN — Medication 9 MG: at 22:12

## 2022-09-25 RX ADMIN — Medication 100 UNITS: at 08:13

## 2022-09-25 RX ADMIN — ASPIRIN 81 MG: 81 TABLET, COATED ORAL at 08:13

## 2022-09-25 RX ADMIN — CYCLOBENZAPRINE HYDROCHLORIDE 10 MG: 5 TABLET, FILM COATED ORAL at 19:50

## 2022-09-25 RX ADMIN — METRONIDAZOLE 500 MG: 500 TABLET ORAL at 06:03

## 2022-09-25 RX ADMIN — GABAPENTIN 300 MG: 300 CAPSULE ORAL at 08:13

## 2022-09-25 RX ADMIN — METRONIDAZOLE 500 MG: 500 TABLET ORAL at 14:09

## 2022-09-25 RX ADMIN — Medication 10 ML: at 08:13

## 2022-09-25 RX ADMIN — METRONIDAZOLE 500 MG: 500 TABLET ORAL at 22:12

## 2022-09-25 RX ADMIN — ENOXAPARIN SODIUM 40 MG: 100 INJECTION SUBCUTANEOUS at 08:13

## 2022-09-25 RX ADMIN — ANTI-FUNGAL POWDER MICONAZOLE NITRATE TALC FREE: 1.42 POWDER TOPICAL at 19:52

## 2022-09-25 RX ADMIN — HYDROCODONE BITARTRATE AND ACETAMINOPHEN 2 TABLET: 5; 325 TABLET ORAL at 18:03

## 2022-09-25 RX ADMIN — Medication 100 UNITS: at 19:52

## 2022-09-25 RX ADMIN — INSULIN LISPRO 1 UNITS: 100 INJECTION, SOLUTION INTRAVENOUS; SUBCUTANEOUS at 19:54

## 2022-09-25 RX ADMIN — WATER 2000 MG: 1 INJECTION INTRAMUSCULAR; INTRAVENOUS; SUBCUTANEOUS at 14:09

## 2022-09-25 RX ADMIN — HYDROCODONE BITARTRATE AND ACETAMINOPHEN 2 TABLET: 5; 325 TABLET ORAL at 14:09

## 2022-09-25 ASSESSMENT — PAIN DESCRIPTION - ORIENTATION
ORIENTATION: LEFT
ORIENTATION: LEFT

## 2022-09-25 ASSESSMENT — PAIN SCALES - GENERAL
PAINLEVEL_OUTOF10: 8
PAINLEVEL_OUTOF10: 8
PAINLEVEL_OUTOF10: 4
PAINLEVEL_OUTOF10: 7
PAINLEVEL_OUTOF10: 8
PAINLEVEL_OUTOF10: 5

## 2022-09-25 ASSESSMENT — PAIN DESCRIPTION - DESCRIPTORS
DESCRIPTORS: DISCOMFORT;JABBING;ACHING
DESCRIPTORS: ACHING;DISCOMFORT;DULL

## 2022-09-25 ASSESSMENT — PAIN DESCRIPTION - LOCATION
LOCATION: FOOT
LOCATION: LEG

## 2022-09-25 NOTE — PROGRESS NOTES
[facility]   [facility]    Admitting Date and Time: 9/13/2022 12:00 PM  Admit Dx: Lactic acidosis [E87.2]  Septicemia ([address] [A41.9]  Wound infection [T14. 8XXA, L08.9]  Wound cellulitis [L03.90]  Sepsis ([address] [A41.9]    Subjective:    9/18: Pt  sitting up in bed in no acute distress. States feels well today. For procedure in am. ? PICC line placement. Denies any new concerns at this time. 9/19: Pt somewhat frustrated that he was NPO most of the day and then procedure got cancelled. Per RN, urgery got postponed due to scheduling issues for OR time. He was placed on schedule for tomorrow. PICC line placed this morning by IV team.    9/20: Pt  is hopeful he can get surgery tomorrow. 9/21: Pt is sitting up in bed. He had surgery today and is having pain. He was asking for something for pain more than Tylenol. After questioning, he had tolerated Vicodin in past.     9/22: Pt states pain controlled. He is frustrated as he just spilled his urinal on floor. 9/23: Pt  resting and denies complaints. 9/24: Pt in better spirits today. He is watching college football    9/25: Pt was concerned that he will get the same as when he goes to the nursing home.  Specifically he was concerned about pain meds     miconazole   Topical BID    heparin flush  100 Units IntraCATHeter BID    cefTRIAXone (ROCEPHIN) IV  2,000 mg IntraVENous Q24H    metroNIDAZOLE  500 mg Oral 3 times per day    sodium chloride flush  10 mL IntraVENous 2 times per day    insulin glargine  5 Units SubCUTAneous Nightly    gabapentin  300 mg Oral BID    clopidogrel  75 mg Oral Daily    aspirin  81 mg Oral Daily    atorvastatin  40 mg Oral Nightly    cyclobenzaprine  10 mg Oral Nightly    insulin lispro  0-4 Units SubCUTAneous Q4H    enoxaparin  40 mg SubCUTAneous Daily     HYDROcodone 5 mg - acetaminophen, 1 tablet, Q4H PRN   Or  HYDROcodone 5 mg - acetaminophen, 2 tablet, Q4H PRN  sodium chloride flush, 10 mL, PRN  sodium chloride, 25 mL, PRN  povidone-iodine, , PRN  calcium carbonate, 500 mg, TID PRN  melatonin, 9 mg, Nightly PRN  diphenhydrAMINE, 25 mg, Q6H PRN  glucose, 4 tablet, PRN  dextrose bolus, 125 mL, PRN   Or  dextrose bolus, 250 mL, PRN  glucagon (rDNA), 1 mg, PRN  dextrose, , Continuous PRN  sodium chloride, , PRN  promethazine, 12.5 mg, Q6H PRN   Or  ondansetron, 4 mg, Q6H PRN  polyethylene glycol, 17 g, Daily PRN  acetaminophen, 650 mg, Q6H PRN   Or  acetaminophen, 650 mg, Q6H PRN       Objective:    /77   Pulse 82   Temp 98.8 °F (37.1 °C) (Oral)   Resp 18   Ht 5' 11\" (1.803 m)   Wt 192 lb (87.1 kg)   SpO2 95%   BMI 26.78 kg/m²   General Appearance: alert and oriented to person, place and time and in no acute distress  Skin: warm and dry  Head: normocephalic and atraumatic  Eyes: pupils equal, round, and reactive to light, extraocular eye movements intact, conjunctivae normal  Neck: neck supple and non tender without mass   Pulmonary/Chest: clear to auscultation bilaterally- no wheezes, rales or rhonchi, normal air movement, no respiratory distress  Cardiovascular: normal rate, normal S1 and S2 and no RGM  Abdomen: soft, non-tender, non-distended, normal bowel sounds  Extremities: no cyanosis, no clubbing and no edema. LLE Surgical dressing CDI.    Neurologic: no cranial nerve deficit and speech normal      Recent Labs     09/23/22  1030 09/24/22  0640 09/25/22  0702   * 131* 131*   K 4.1 4.4 4.5   CL 97* 100 101   CO2 23 21* 21*   BUN 10 10 11   CREATININE 0.7 0.7 0.7   GLUCOSE 112* 144* 121*   CALCIUM 8.3* 8.6 8.6         Recent Labs     09/23/22  1030 09/25/22  0702   ALKPHOS 87 85   PROT 8.0 8.0   LABALBU 2.8* 2.4*   BILITOT 0.5 0.3   AST 21 21   ALT 14 12         Recent Labs     09/23/22  1030 09/24/22  0640 09/25/22  0702   WBC 12.6* 11.7* 11.3   RBC 3.26* 3.32* 3.26*   HGB 9.4* 9.7* 9.3*   HCT 28.7* 29.5* 28.8*   MCV 88.0 88.9 88.3   MCH 28.8 29.2 28.5   MCHC 32.8 32.9 32.3   RDW 15.2* 15.7* 15.4*   * 574* 550*   MPV 9.2 9.9 9.3              Radiology:   VL LOWER EXTREMITY ARTERIAL SEGMENTAL PRESSURES W PPG BILATERAL   Final Result   In spite of normal bilateral ankle-brachial indices, Doppler and PVR   waveforms suggest at least mild, symmetric bilateral lower extremity arterial   stenosis. Correlation with CTA of the abdomen/pelvis with lower extremity   runoff could be considered for better assessment. XR FOOT LEFT (2 VIEWS)   Final Result   Marked soft tissue swelling in the dorsal aspect of the mid and distal foot. Slight interval improvement of soft tissue ulceration in the heel and   cortical irregularity along the posterior margin of the calcaneus. Question hairline fracture, proximal phalanx of 5th toe. Please correlate   with focal tenderness. XR CHEST 1 VIEW   Final Result   No acute process. There are multiple old left-sided rib fractures. Assessment:  Principal Problem:    Sepsis due to group A beta-hemolytic Streptococcus ([address]  Active Problems:    Diabetes mellitus ([address]    Osteomyelitis of left lower extremity (HCC)    Alcohol abuse    Coronary artery disease involving native coronary artery of native heart without angina pectoris    Sepsis due to cellulitis (HCC)    Lactic acidosis    Uncontrolled type 2 diabetes mellitus with hyperglycemia (HCC)    Type 2 diabetes mellitus with left diabetic foot ulcer ([address]    Bacteremia due to Gram-positive bacteria    Hyponatremia    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 2 diabetes mellitus, with necrosis of bone ([address]  Resolved Problems:    * No resolved hospital problems.  *      Plan:                 Sepsis due to cellulitis, left calcaneal osteomyelitis as well as Strep pyogenes bacteremia in patient with left diabetic foot ulcer  -Sepsis evidenced by leukocytosis (33.6), tachycardia (102) and cellulitis of the foot  -Podiatry and ID following  -Status post incision and drainage of left calcaneus and posterior left leg on 9/14. Underwent partial calcanectomy with debridement  9/21 by podiatry. [place] pain panel added for pain control postop has helped  -Blood cultures with strep pyogenes, wound cultures with strep pyogenes and staph aureus. Antibiotics narrowed to ceftriaxone 2 g IV daily and metronidazole 500 mg p.o. every 8 hours per ID; day #9. Prior to this completed 4 days of cefepime and 3 days of Vanco.  -Discussed that he will need long term IV antibiotics and ID plans 6 weeks of ceftriaxone and 6 weeks of oral metronidazole. Repeat blood cultures show no growth. -SL PICC line placed 9/19 R brachial vein. 2.         Lactic acidosis(resolved)  -Due to the above but resolved with IV fluid hydration  -Lactic acid trend as follows 4.3-->4.1- >1.9     3. Type II diabetes mellitus  -Added Lantus 5 units nightly in addition to corrective scale with improved glycemic control      4. Hypertension/CAD  -Hydrochlorothiazide on hold given hyponatremia  -Holding Plavix prior to surgery 9/21. Friday, podiatry okayed resumption of Plavix and this was restarted Sat morning. 5.        Hyponatremia  -likely component of hypovolemia due to sepsis, however he has had this in the past due to alcohol abuse. States he has been drinking 6-8 beers a day lately  -Now that he has been volume resuscitated from sepsis standpoint, the fluids have been discontinued  -Sodium is correcting at an appropriate rate; Na had been stable 129 but Friday down to 127. Over the weekend Na improved to 131.     6.       Alcohol abuse  -6-8 beers per day  -counseled on cessation   -no signs of withdrawal syndrome at this time, but will have low threshold for initiating CIWA protocol but appears to be out of window for this. Disposition: Patient is now agreeable to SNF. At least several more days in the hospital for surgery on 9/21, postoperative care.  He requested [facility] and they accepted. Precert initiated. is medically stable for discharge once precert obtained. NOTE: This report was transcribed using voice recognition software. Every effort was made to ensure accuracy; however, inadvertent computerized transcription errors may be present.      Electronically signed by Deneen Chapman MD on 9/25/2022 at 5:46 PM

## 2022-09-25 NOTE — PROGRESS NOTES
NAME: Mayte Alvarez  MR:  [id number]  :   1960  DATE OF SERVICE:22    This is a face to face encounter with Mayte Alvarez 60 y.o. male on 22  Elements of this note, including Diagnosis,  Interval History, Past Medical/Surgical/Family/Social Histories, ROS, physical exam, and Assessment and Plan were copied and pasted from Previous. Updates have been made where noted and reflect current exam and medical decision making from the DOS of this encounter. CHIEF COMPLAINT     ID following for   Chief Complaint   Patient presents with    Fatigue     Onset several days-hx left foot diabetic wound     HISTORY OF PRESENT ILLNESS     Pt seen and examined   22  To be cleaned up no new issues  In bed   Foot dressed rue picc       9/15 blood cx NGTD   tissue cx anaerobic gnr/gpc    foor cx MRSA/strep mixed GNR   blood cx BHS group A /S pyogenes       REVIEW OF SYSTEMS     As stated above in the chief complaint, otherwise negative.   CURRENT MEDICATIONS     Current Facility-Administered Medications:     HYDROcodone-acetaminophen (NORCO) 5-325 MG per tablet 1 tablet, 1 tablet, Oral, Q4H PRN, 1 tablet at 22 0633 **OR** HYDROcodone-acetaminophen (NORCO) 5-325 MG per tablet 2 tablet, 2 tablet, Oral, Q4H PRN, Lianna Tellez MD, 2 tablet at 22 1014    heparin flush 100 UNIT/ML injection 100 Units, 100 Units, IntraCATHeter, BID, Lianna Tellez MD, 100 Units at 22 0813    cefTRIAXone (ROCEPHIN) 2,000 mg in sterile water 20 mL IV syringe, 2,000 mg, IntraVENous, Q24H, Donald Macdonald MD, 2,000 mg at 22 1416    metroNIDAZOLE (FLAGYL) tablet 500 mg, 500 mg, Oral, 3 times per day, Donald Macdonald MD, 500 mg at 22 0603    sodium chloride flush 0.9 % injection 10 mL, 10 mL, IntraVENous, 2 times per day, Donald Macdonald MD, 10 mL at 22 0813    sodium chloride flush 0.9 % injection 10 mL, 10 mL, IntraVENous, PRN, Donald Macdonald MD    0.9 % sodium chloride infusion, 25 mL, IntraVENous, PRN, Ramírez Blanco MD    insulin glargine (LANTUS) injection vial 5 Units, 5 Units, SubCUTAneous, Nightly, Lamont Wells DO, 5 Units at 09/24/22 2011    povidone-iodine (BETADINE) 10 % external solution, , Topical, PRN, Esther Kerns MD    calcium carbonate (TUMS) chewable tablet 500 mg, 500 mg, Oral, TID PRN, Vivian Andrew DO, 500 mg at 09/20/22 2046    gabapentin (NEURONTIN) capsule 300 mg, 300 mg, Oral, BID, Yeison Ahammad, DPM, 300 mg at 09/25/22 0813    clopidogrel (PLAVIX) tablet 75 mg, 75 mg, Oral, Daily, Yeison Ahammad, DPM, 75 mg at 09/25/22 0813    aspirin EC tablet 81 mg, 81 mg, Oral, Daily, Yeison Ahammad, DPM, 81 mg at 09/25/22 0813    melatonin tablet 9 mg, 9 mg, Oral, Nightly PRN, Yeison Ahammad, DPM, 9 mg at 09/24/22 2010    diphenhydrAMINE (BENADRYL) tablet 25 mg, 25 mg, Oral, Q6H PRN, Yeison Ahammad, DPM, 25 mg at 09/17/22 2239    atorvastatin (LIPITOR) tablet 40 mg, 40 mg, Oral, Nightly, Yeison Ahammad, DPM, 40 mg at 09/24/22 2010    cyclobenzaprine (FLEXERIL) tablet 10 mg, 10 mg, Oral, Nightly, Yeison Ahammad, DPM, 10 mg at 09/24/22 2010    glucose chewable tablet 16 g, 4 tablet, Oral, PRN, Yeison Ahammad, DPM    dextrose bolus 10% 125 mL, 125 mL, IntraVENous, PRN **OR** dextrose bolus 10% 250 mL, 250 mL, IntraVENous, PRN, Yeison Ahammad, DPM    glucagon (rDNA) injection 1 mg, 1 mg, SubCUTAneous, PRN, Yeison Ahammad, DPM    dextrose 10 % infusion, , IntraVENous, Continuous PRN, Yeison Ahammad, DPM    insulin lispro (HUMALOG) injection vial 0-4 Units, 0-4 Units, SubCUTAneous, Q4H, Yeison Ahammad, DPM, 1 Units at 09/20/22 2052    0.9 % sodium chloride infusion, , IntraVENous, PRN, Yeison Ahammad, DPM    enoxaparin (LOVENOX) injection 40 mg, 40 mg, SubCUTAneous, Daily, Yeison Ahammad, DPM, 40 mg at 09/25/22 0813    promethazine (PHENERGAN) tablet 12.5 mg, 12.5 mg, Oral, Q6H PRN **OR** ondansetron (ZOFRAN) injection 4 mg, 4 mg, IntraVENous, Q6H PRN, Leeann Lepe Ahammad, DPM, 4 mg at 09/15/22 1652    polyethylene glycol (GLYCOLAX) packet 17 g, 17 g, Oral, Daily PRN, Yeison Ahammad, DPM    acetaminophen (TYLENOL) tablet 650 mg, 650 mg, Oral, Q6H PRN **OR** acetaminophen (TYLENOL) suppository 650 mg, 650 mg, Rectal, Q6H PRN, Yeison Ahammad, DPM  PHYSICAL EXAM     /77   Pulse 82   Temp 98.8 °F (37.1 °C) (Oral)   Resp 18   Ht 5' 11\" (1.803 m)   Wt 192 lb (87.1 kg)   SpO2 95%   BMI 26.78 kg/m²   Temp  Av.5 °F (36.9 °C)  Min: 98.2 °F (36.8 °C)  Max: 98.8 °F (37.1 °C)  Constitutional:  The patient is awake, alert   nad  Skin:    Warm and dry   HEENT:     AT/NC   Chest:   Cta ant  no wheeze on RA  Cardiovascular:  S1 and S2 are rhythmic and regular. Abdomen:    BS Benign to palpation. Soft    Extremities:    LEFT LE ACE   CNS    Awake nad      Right arm with PICC  Peripherally Inserted Central Catheter:  PICC Single Lumen 22 Right Brachial (Active)   Central Line Being Utilized Yes 22 0932   Criteria for Appropriate Use Long term IV/antibiotic administration 22 0932   Site Assessment Clean;Dry; Intact 22 1036   Phlebitis Assessment No symptoms 2232   Infiltration Assessment 0 22 0932   Lumen Color/Status Pink;Flushed 22 1036   Extremity Circumference (cm) 32 cm 22 0932   Alcohol Cap Used Yes 22 0932   Date of Last Dressing Change 22 0932   Dressing Type Transparent; Antimicrobial;Securing device 22 1036   Dressing Status Clean, dry & intact 22 1036        DIAGNOSTIC RESULTS   Radiology:    Recent Labs     22  1030 22  0640 22  0702   WBC 12.6* 11.7* 11.3   RBC 3.26* 3.32* 3.26*   HGB 9.4* 9.7* 9.3*   HCT 28.7* 29.5* 28.8*   MCV 88.0 88.9 88.3   MCH 28.8 29.2 28.5   MCHC 32.8 32.9 32.3   RDW 15.2* 15.7* 15.4*   * 574* 550*   MPV 9.2 9.9 9.3       Recent Labs     22  1030 22  0640 22  0702   * 131* 131*   K 4.1 4.4 4.5   CL 97* 100 101   CO2 23 21* 21*   BUN 10 10 11   CREATININE 0.7 0.7 0.7   GLUCOSE 112* 144* 121*   PROT 8.0  --  8.0   LABALBU 2.8*  --  2.4*   CALCIUM 8.3* 8.6 8.6   BILITOT 0.5  --  0.3   ALKPHOS 87  --  85   AST 21  --  21   ALT 14  --  12       Lab Results   Component Value Date    CRP 24.9 (H) 09/13/2022    CRP 2.3 (H) 02/14/2021     Lab Results   Component Value Date    SEDRATE 125 (H) 09/23/2022    SEDRATE 90 (H) 09/13/2022    SEDRATE 87 (H) 02/14/2021     Recent Labs     09/23/22  1030 09/25/22  0702   AST 21 21   ALT 14 12       Lab Results   Component Value Date/Time    CHOL 164 03/01/2016 08:10 AM    TRIG 170 03/01/2016 08:10 AM    HDL 43 03/01/2016 08:10 AM    LDLCALC 87 03/01/2016 08:10 AM    LABVLDL 34 03/01/2016 08:10 AM     Lab Results   Component Value Date/Time    VITD25 12 (L) 12/28/2020 09:15 AM       Microbiology:   No results for input(s): COVID19 in the last 72 hours. Lab Results   Component Value Date/Time    BC 5 Days no growth 09/15/2022 07:24 AM    BC 5 Days no growth 02/16/2021 04:37 PM    BC Previously positive blood culture called 02/14/2021 07:07 PM    BC  02/14/2021 07:07 PM     Refer to previous culture for susceptibility results  of CXBL2 (LAC) collected 02/14/2021 at 19:07      BLOODCULT2 5 Days no growth 09/15/2022 07:25 AM    BLOODCULT2 Previously positive blood culture called 09/13/2022 02:59 PM    BLOODCULT2  09/13/2022 02:59 PM     Refer to previous culture CXBL 9/13/2022 1459 for susceptibility results    ORG Anaerobic gram negative justo 09/14/2022 04:41 PM    ORG Anaerobic gram positive cocci 09/14/2022 04:41 PM    ORG BHS Group A (Strep pyogenes) 09/13/2022 02:59 PM    ORG BHS Group A (Strep pyogenes) 09/13/2022 02:59 PM    ORG Staphylococcus aureus 09/13/2022 02:59 PM    ORG Strep pyogenes (Beta Strep Group A) 09/13/2022 02:59 PM     WOUND/ABSCESS   Date Value Ref Range Status   09/13/2022 (A)  Final    Mixed laura isolated.  Further workup and sensitivity testing  is not routinely indicated and will not be performed. Mixed laura isolated includes:  Mixed Gram negative rods  Gram positive rods catalase negative     09/13/2022   Final    Heavy growth  This isolate is presumed to be resistant based on the  detection of inducible Clindamycin resistance. Clindamycin  may still be effective in some patients. 09/13/2022 Heavy growth  Final     Smear, Respiratory   Date Value Ref Range Status   01/01/2021   Final    Group 6: <25 PMN's/LPF and <25 Epithelial cells/LPF  Rare Polymorphonuclear leukocytes  Epithelial cells not seen  No organisms seen           Component Value Date/Time    AFBCX No growth after 6 weeks of incubation. 02/16/2021 0732    AFBCX No growth after 6 weeks of incubation. 02/16/2021 0730    FUNGSM No Fungal elements seen 09/14/2022 1641    LABFUNG No growth after 4 weeks of incubation. 02/16/2021 0732    LABFUNG No growth after 4 weeks of incubation. 02/16/2021 0730       MRSA Culture Only   Date Value Ref Range Status   12/26/2020 Methicillin resistant Staph aureus not isolated  Final     CULTURE, RESPIRATORY   Date Value Ref Range Status   01/01/2021 Oral Pharyngeal Laura absent  Growth not present    Final     FINAL IMPRESSION    Pt had   Chief Complaint   Patient presents with    Fatigue     Onset several days-hx left foot diabetic wound    Admitted for Lactic acidosis [E87.2]  S/p SEPSIS  -Streptococcus species SEPTICEMIA 9/13  Foot cx MSSA/Strep   -LEUKOCYTOSIS trending down   -LEFT LE CELLULITIS  -LEFT HEEL OM   Leukocytosis  trending down   9/14 1. Incision and drainage, left calcaneus bone.  2.  Incision and drainage posterior left leg.     9/21 LEFT FOOT PARTIAL CALCANECTOMY AND DEBRIDEMENT      Plan:      cefTRIAXone (ROCEPHIN) 2,000 mg in sterile water 20 mL IV syringe, Q24H  metroNIDAZOLE (FLAGYL) tablet 500 mg, 3 times per day       sed rate- 125 repeat in am   Continue wound care per podiatry  Can d/c- awaiting precert  to  [employer] rec done from ID POV     Imaging and labs were reviewed. [facility] was informed of their diagnosis, indications, risks and benefits of treatment. [facility] had the opportunity to ask questions. All questions were answered. Thank you for involving me in the care of [facility]. Please do not hesitate to call for any questions or concerns.     Electronically signed by Henderson Gilford, MD on 9/25/2022 at 10:55 AM

## 2022-09-26 VITALS
WEIGHT: 192 LBS | SYSTOLIC BLOOD PRESSURE: 135 MMHG | RESPIRATION RATE: 18 BRPM | HEIGHT: 71 IN | TEMPERATURE: 98 F | BODY MASS INDEX: 26.88 KG/M2 | HEART RATE: 72 BPM | DIASTOLIC BLOOD PRESSURE: 64 MMHG | OXYGEN SATURATION: 96 %

## 2022-09-26 LAB
ANION GAP SERPL CALCULATED.3IONS-SCNC: 9 MMOL/L (ref 7–16)
BASOPHILS ABSOLUTE: 0.11 E9/L (ref 0–0.2)
BASOPHILS RELATIVE PERCENT: 1 % (ref 0–2)
BUN BLDV-MCNC: 12 MG/DL (ref 6–23)
CALCIUM SERPL-MCNC: 8.8 MG/DL (ref 8.6–10.2)
CHLORIDE BLD-SCNC: 98 MMOL/L (ref 98–107)
CO2: 23 MMOL/L (ref 22–29)
CREAT SERPL-MCNC: 0.7 MG/DL (ref 0.7–1.2)
EOSINOPHILS ABSOLUTE: 0.1 E9/L (ref 0.05–0.5)
EOSINOPHILS RELATIVE PERCENT: 0.9 % (ref 0–6)
GFR AFRICAN AMERICAN: >60
GFR NON-AFRICAN AMERICAN: >60 ML/MIN/1.73
GLUCOSE BLD-MCNC: 145 MG/DL (ref 74–99)
HCT VFR BLD CALC: 29.6 % (ref 37–54)
HEMOGLOBIN: 9.8 G/DL (ref 12.5–16.5)
IMMATURE GRANULOCYTES #: 0.11 E9/L
IMMATURE GRANULOCYTES %: 1 % (ref 0–5)
LYMPHOCYTES ABSOLUTE: 1.61 E9/L (ref 1.5–4)
LYMPHOCYTES RELATIVE PERCENT: 15.2 % (ref 20–42)
MCH RBC QN AUTO: 29.2 PG (ref 26–35)
MCHC RBC AUTO-ENTMCNC: 33.1 % (ref 32–34.5)
MCV RBC AUTO: 88.1 FL (ref 80–99.9)
METER GLUCOSE: 146 MG/DL (ref 74–99)
METER GLUCOSE: 153 MG/DL (ref 74–99)
METER GLUCOSE: 91 MG/DL (ref 74–99)
MONOCYTES ABSOLUTE: 0.73 E9/L (ref 0.1–0.95)
MONOCYTES RELATIVE PERCENT: 6.9 % (ref 2–12)
NEUTROPHILS ABSOLUTE: 7.92 E9/L (ref 1.8–7.3)
NEUTROPHILS RELATIVE PERCENT: 75 % (ref 43–80)
PDW BLD-RTO: 15.4 FL (ref 11.5–15)
PLATELET # BLD: 636 E9/L (ref 130–450)
PMV BLD AUTO: 9.3 FL (ref 7–12)
POTASSIUM SERPL-SCNC: 4.5 MMOL/L (ref 3.5–5)
RBC # BLD: 3.36 E12/L (ref 3.8–5.8)
SEDIMENTATION RATE, ERYTHROCYTE: 132 MM/HR (ref 0–15)
SODIUM BLD-SCNC: 130 MMOL/L (ref 132–146)
WBC # BLD: 10.6 E9/L (ref 4.5–11.5)

## 2022-09-26 PROCEDURE — 6370000000 HC RX 637 (ALT 250 FOR IP): Performed by: SPECIALIST

## 2022-09-26 PROCEDURE — 6370000000 HC RX 637 (ALT 250 FOR IP): Performed by: STUDENT IN AN ORGANIZED HEALTH CARE EDUCATION/TRAINING PROGRAM

## 2022-09-26 PROCEDURE — 85651 RBC SED RATE NONAUTOMATED: CPT

## 2022-09-26 PROCEDURE — 80048 BASIC METABOLIC PNL TOTAL CA: CPT

## 2022-09-26 PROCEDURE — 6360000002 HC RX W HCPCS: Performed by: STUDENT IN AN ORGANIZED HEALTH CARE EDUCATION/TRAINING PROGRAM

## 2022-09-26 PROCEDURE — 97110 THERAPEUTIC EXERCISES: CPT

## 2022-09-26 PROCEDURE — 85025 COMPLETE CBC W/AUTO DIFF WBC: CPT

## 2022-09-26 PROCEDURE — 6370000000 HC RX 637 (ALT 250 FOR IP): Performed by: INTERNAL MEDICINE

## 2022-09-26 PROCEDURE — 2580000003 HC RX 258: Performed by: SPECIALIST

## 2022-09-26 PROCEDURE — 36415 COLL VENOUS BLD VENIPUNCTURE: CPT

## 2022-09-26 PROCEDURE — 6360000002 HC RX W HCPCS: Performed by: INTERNAL MEDICINE

## 2022-09-26 PROCEDURE — 82962 GLUCOSE BLOOD TEST: CPT

## 2022-09-26 PROCEDURE — 36592 COLLECT BLOOD FROM PICC: CPT

## 2022-09-26 PROCEDURE — 97530 THERAPEUTIC ACTIVITIES: CPT

## 2022-09-26 PROCEDURE — 99239 HOSP IP/OBS DSCHRG MGMT >30: CPT | Performed by: INTERNAL MEDICINE

## 2022-09-26 RX ORDER — ENOXAPARIN SODIUM 100 MG/ML
40 INJECTION SUBCUTANEOUS DAILY
DISCHARGE
Start: 2022-09-27

## 2022-09-26 RX ADMIN — Medication 100 UNITS: at 08:39

## 2022-09-26 RX ADMIN — METRONIDAZOLE 500 MG: 500 TABLET ORAL at 06:11

## 2022-09-26 RX ADMIN — Medication 10 ML: at 08:40

## 2022-09-26 RX ADMIN — CLOPIDOGREL BISULFATE 75 MG: 75 TABLET ORAL at 08:39

## 2022-09-26 RX ADMIN — ENOXAPARIN SODIUM 40 MG: 100 INJECTION SUBCUTANEOUS at 08:39

## 2022-09-26 RX ADMIN — ANTI-FUNGAL POWDER MICONAZOLE NITRATE TALC FREE: 1.42 POWDER TOPICAL at 08:40

## 2022-09-26 RX ADMIN — HYDROCODONE BITARTRATE AND ACETAMINOPHEN 2 TABLET: 5; 325 TABLET ORAL at 10:34

## 2022-09-26 RX ADMIN — ASPIRIN 81 MG: 81 TABLET, COATED ORAL at 08:39

## 2022-09-26 RX ADMIN — HYDROCODONE BITARTRATE AND ACETAMINOPHEN 2 TABLET: 5; 325 TABLET ORAL at 06:10

## 2022-09-26 RX ADMIN — GABAPENTIN 300 MG: 300 CAPSULE ORAL at 08:39

## 2022-09-26 ASSESSMENT — PAIN DESCRIPTION - ORIENTATION: ORIENTATION: LEFT

## 2022-09-26 ASSESSMENT — PAIN SCALES - GENERAL: PAINLEVEL_OUTOF10: 9

## 2022-09-26 ASSESSMENT — PAIN DESCRIPTION - DESCRIPTORS: DESCRIPTORS: ACHING;DISCOMFORT;DULL

## 2022-09-26 NOTE — DISCHARGE INSTRUCTIONS
Podiatry Wound Care Instructions for Discharge   - Change dressings to left lower extremity every other day with adaptic, 4x4 gauzes, kerlix, ace bandage in SLIGHTLY compressive manner.   Please be gentle on graft/flap   - Non-weightbearing to left lower extremity   - follow up with Dr. Danyel Kemp 1 week after discharge

## 2022-09-26 NOTE — PROGRESS NOTES
NAME: Azael Caruso  MR:  [id number]  :   1960  DATE OF SERVICE:22    This is a face to face encounter with Azael Caruso 60 y.o. male on 22  Elements of this note, including Diagnosis,  Interval History, Past Medical/Surgical/Family/Social Histories, ROS, physical exam, and Assessment and Plan were copied and pasted from Previous. Updates have been made where noted and reflect current exam and medical decision making from the DOS of this encounter. CHIEF COMPLAINT     ID following for   Chief Complaint   Patient presents with    Fatigue     Onset several days-hx left foot diabetic wound     HISTORY OF PRESENT ILLNESS     Pt seen and examined   22  Patient is in bed- no new issues  He is getting washed up   On room air- no fevers. 2022  To be cleaned up no new issues  In bed   Foot dressed rue picc       9/15 blood cx NGTD   tissue cx anaerobic gnr/gpc    foor cx MRSA/strep mixed GNR   blood cx BHS group A /S pyogenes       REVIEW OF SYSTEMS     As stated above in the chief complaint, otherwise negative.   CURRENT MEDICATIONS     Current Facility-Administered Medications:     miconazole (MICOTIN) 2 % powder, , Topical, BID, Shanda Huggins MD, Given at 22 0840    HYDROcodone-acetaminophen (NORCO) 5-325 MG per tablet 1 tablet, 1 tablet, Oral, Q4H PRN, 1 tablet at 22 2932 **OR** HYDROcodone-acetaminophen (NORCO) 5-325 MG per tablet 2 tablet, 2 tablet, Oral, Q4H PRN, Shanda Huggins MD, 2 tablet at 22 1034    heparin flush 100 UNIT/ML injection 100 Units, 100 Units, IntraCATHeter, BID, Shanda Huggins MD, 100 Units at 22 0839    cefTRIAXone (ROCEPHIN) 2,000 mg in sterile water 20 mL IV syringe, 2,000 mg, IntraVENous, Q24H, Mallorie Serrato MD, 2,000 mg at 22 1409    metroNIDAZOLE (FLAGYL) tablet 500 mg, 500 mg, Oral, 3 times per day, Mallorie Serrato MD, 500 mg at 22 0611    sodium chloride flush 0.9 % injection 10 mL, 10 mL, IntraVENous, 2 times per day, Chung Valdez MD, 10 mL at 09/26/22 0840    sodium chloride flush 0.9 % injection 10 mL, 10 mL, IntraVENous, PRN, Chung Valdez MD    0.9 % sodium chloride infusion, 25 mL, IntraVENous, PRN, Chung Valdez MD    insulin glargine (LANTUS) injection vial 5 Units, 5 Units, SubCUTAneous, Nightly, Lamont Wells DO, 5 Units at 09/25/22 1954    povidone-iodine (BETADINE) 10 % external solution, , Topical, PRN, Viridiana Koch MD    calcium carbonate (TUMS) chewable tablet 500 mg, 500 mg, Oral, TID PRN, Lamjoycelyn Raysa, DO, 500 mg at 09/20/22 2046    gabapentin (NEURONTIN) capsule 300 mg, 300 mg, Oral, BID, Yeison Ahammad, DPM, 300 mg at 09/26/22 3125    clopidogrel (PLAVIX) tablet 75 mg, 75 mg, Oral, Daily, Yeison Ahammad, DPM, 75 mg at 09/26/22 3347    aspirin EC tablet 81 mg, 81 mg, Oral, Daily, Yeison Ahammad, DPM, 81 mg at 09/26/22 0839    melatonin tablet 9 mg, 9 mg, Oral, Nightly PRN, Yeison Ahammad, DPM, 9 mg at 09/25/22 2212    diphenhydrAMINE (BENADRYL) tablet 25 mg, 25 mg, Oral, Q6H PRN, Yeison Ahammad, DPM, 25 mg at 09/17/22 2239    atorvastatin (LIPITOR) tablet 40 mg, 40 mg, Oral, Nightly, Yeison Ahammad, DPM, 40 mg at 09/25/22 1951    cyclobenzaprine (FLEXERIL) tablet 10 mg, 10 mg, Oral, Nightly, Yeison Ahammad, DPM, 10 mg at 09/25/22 1950    glucose chewable tablet 16 g, 4 tablet, Oral, PRN, Yeison Ahammad, DPM    dextrose bolus 10% 125 mL, 125 mL, IntraVENous, PRN **OR** dextrose bolus 10% 250 mL, 250 mL, IntraVENous, PRN, Yeison Ahammad, DPM    glucagon (rDNA) injection 1 mg, 1 mg, SubCUTAneous, PRN, Yeison Ahammad, DPM    dextrose 10 % infusion, , IntraVENous, Continuous PRN, Yeison Ahammad, DPM    insulin lispro (HUMALOG) injection vial 0-4 Units, 0-4 Units, SubCUTAneous, Q4H, Yeison Ahammad, DPM, 1 Units at 09/25/22 1954    0.9 % sodium chloride infusion, , IntraVENous, PRN, Yeison Ahammad, DPM    enoxaparin (LOVENOX) injection 40 mg, 40 mg, SubCUTAneous, Daily, Julius Milks, DPM, 40 mg at 22 0839    promethazine (PHENERGAN) tablet 12.5 mg, 12.5 mg, Oral, Q6H PRN **OR** ondansetron (ZOFRAN) injection 4 mg, 4 mg, IntraVENous, Q6H PRN, Yeison Ahammad, DPM, 4 mg at 09/15/22 1652    polyethylene glycol (GLYCOLAX) packet 17 g, 17 g, Oral, Daily PRN, Yeison Ahammad, DPM    acetaminophen (TYLENOL) tablet 650 mg, 650 mg, Oral, Q6H PRN **OR** acetaminophen (TYLENOL) suppository 650 mg, 650 mg, Rectal, Q6H PRN, Yeison Ahammad, DPM  PHYSICAL EXAM     /64   Pulse 72   Temp 98 °F (36.7 °C) (Oral)   Resp 18   Ht 5' 11\" (1.803 m)   Wt 192 lb (87.1 kg)   SpO2 96%   BMI 26.78 kg/m²   Temp  Av °F (36.7 °C)  Min: 97.9 °F (36.6 °C)  Max: 98 °F (36.7 °C)  Constitutional:  The patient is awake, alert , NAD  Skin:    Warm and dry   HEENT:     AT/NC   Chest:   Cta ant  no wheeze on RA  Cardiovascular:  S1 and S2 are rhythmic and regular. Abdomen:    BS Benign to palpation. Soft    Extremities:    LEFT LE ACE   CNS    Awake nad      Right arm with PICC  Peripherally Inserted Central Catheter:  PICC Single Lumen 22 Right Brachial (Active)   Central Line Being Utilized Yes 2232   Criteria for Appropriate Use Long term IV/antibiotic administration 2232   Site Assessment Clean;Dry; Intact 22 1036   Phlebitis Assessment No symptoms 22   Infiltration Assessment 0 2232   Lumen Color/Status Pink;Flushed 22 1036   Extremity Circumference (cm) 32 cm 2232   Alcohol Cap Used Yes 2232   Date of Last Dressing Change 22 0932   Dressing Type Transparent; Antimicrobial;Securing device 22 1036   Dressing Status Clean, dry & intact 22 1036        DIAGNOSTIC RESULTS   Radiology:    Recent Labs     22  0640 22  0702 22  0624   WBC 11.7* 11.3 10.6   RBC 3.32* 3.26* 3.36*   HGB 9.7* 9.3* 9.8*   HCT 29.5* 28.8* 29.6*   MCV 88.9 88.3 88.1   MCH 29.2 28.5 29.2   MCHC 32.9 32.3 33.1   RDW 15.7* 15.4* 15.4*   * 550* 636*   MPV 9.9 9.3 9.3       Recent Labs     09/24/22  0640 09/25/22  0702 09/26/22  0624   * 131* 130*   K 4.4 4.5 4.5    101 98   CO2 21* 21* 23   BUN 10 11 12   CREATININE 0.7 0.7 0.7   GLUCOSE 144* 121* 145*   PROT  --  8.0  --    LABALBU  --  2.4*  --    CALCIUM 8.6 8.6 8.8   BILITOT  --  0.3  --    ALKPHOS  --  85  --    AST  --  21  --    ALT  --  12  --        Lab Results   Component Value Date    CRP 24.9 (H) 09/13/2022    CRP 2.3 (H) 02/14/2021     Lab Results   Component Value Date    SEDRATE 132 (H) 09/26/2022    SEDRATE 125 (H) 09/23/2022    SEDRATE 90 (H) 09/13/2022     Recent Labs     09/25/22  0702   AST 21   ALT 12       Lab Results   Component Value Date/Time    CHOL 164 03/01/2016 08:10 AM    TRIG 170 03/01/2016 08:10 AM    HDL 43 03/01/2016 08:10 AM    LDLCALC 87 03/01/2016 08:10 AM    LABVLDL 34 03/01/2016 08:10 AM     Lab Results   Component Value Date/Time    VITD25 12 (L) 12/28/2020 09:15 AM       Microbiology:   No results for input(s): COVID19 in the last 72 hours.     Lab Results   Component Value Date/Time    BC 5 Days no growth 09/15/2022 07:24 AM    BC 5 Days no growth 02/16/2021 04:37 PM    BC Previously positive blood culture called 02/14/2021 07:07 PM    [facility]  02/14/2021 07:07 PM     Refer to previous culture for susceptibility results  of CXBL2 (LAC) collected 02/14/2021 at 19:07      BLOODCULT2 5 Days no growth 09/15/2022 07:25 AM    BLOODCULT2 Previously positive blood culture called 09/13/2022 02:59 PM    BLOODCULT2  09/13/2022 02:59 PM     Refer to previous culture CXBL 9/13/2022 1459 for susceptibility results    ORG Anaerobic gram negative justo 09/14/2022 04:41 PM    ORG Anaerobic gram positive cocci 09/14/2022 04:41 PM    ORG BHS Group A (Strep pyogenes) 09/13/2022 02:59 PM    ORG BHS Group A (Strep pyogenes) 09/13/2022 02:59 PM    ORG Staphylococcus aureus 09/13/2022 02:59 PM    ORG Strep pyogenes (Beta Strep Group A) 09/13/2022 02:59 PM     WOUND/ABSCESS   Date Value Ref Range Status   09/13/2022 (A)  Final    Mixed laura isolated. Further workup and sensitivity testing  is not routinely indicated and will not be performed. Mixed laura isolated includes:  Mixed Gram negative rods  Gram positive rods catalase negative     09/13/2022   Final    Heavy growth  This isolate is presumed to be resistant based on the  detection of inducible Clindamycin resistance. Clindamycin  may still be effective in some patients. 09/13/2022 Heavy growth  Final     Smear, Respiratory   Date Value Ref Range Status   01/01/2021   Final    Group 6: <25 PMN's/LPF and <25 Epithelial cells/LPF  Rare Polymorphonuclear leukocytes  Epithelial cells not seen  No organisms seen           Component Value Date/Time    AFBCX No growth after 6 weeks of incubation. 02/16/2021 0732    AFBCX No growth after 6 weeks of incubation. 02/16/2021 0730    FUNGSM No Fungal elements seen 09/14/2022 1641    LABFUNG No growth after 1 week/s of incubation. 09/14/2022 1641    LABFUNG No growth after 4 weeks of incubation. 02/16/2021 0732    LABFUNG No growth after 4 weeks of incubation. 02/16/2021 0730       MRSA Culture Only   Date Value Ref Range Status   12/26/2020 Methicillin resistant Staph aureus not isolated  Final     CULTURE, RESPIRATORY   Date Value Ref Range Status   01/01/2021 Oral Pharyngeal Laura absent  Growth not present    Final     FINAL IMPRESSION    Pt had   Chief Complaint   Patient presents with    Fatigue     Onset several days-hx left foot diabetic wound    Admitted for Lactic acidosis [E87.2]  S/p SEPSIS  -Streptococcus species SEPTICEMIA 9/13  Foot cx MSSA/Strep   -LEUKOCYTOSIS trending down   -LEFT LE CELLULITIS  -LEFT HEEL OM   Leukocytosis- improved   9/14 1. Incision and drainage, left calcaneus bone.  2.  Incision and drainage posterior left leg.     9/21 LEFT FOOT PARTIAL CALCANECTOMY AND DEBRIDEMENT      Plan:   cefTRIAXone (ROCEPHIN) 2,000 mg in sterile water 20 mL IV syringe, Q24H  metroNIDAZOLE (FLAGYL) tablet 500 mg, 3 times per day  sed rate- 132  Continue wound care per podiatry  Can d/c- to [employer]   Follow-up with ID in 2 weeks   Med rec done from ID POV     Imaging and labs were reviewed. [facility] was informed of their diagnosis, indications, risks and benefits of treatment. [facility] had the opportunity to ask questions. All questions were answered. Thank you for involving me in the care of [facility]. Please do not hesitate to call for any questions or concerns.     Electronically signed by ALBERT Villagran on 9/26/2022 at 12:28 PM     PT WAS SEEN AND DISCUSSED WITH NP ON 9/26  POC WAS DISCUSSED PRIOR TO D/C  TO F/U 2 Braden Bro MD

## 2022-09-26 NOTE — CARE COORDINATION
9/26/2022 1131 CM note:  Negative covid 9/19/22. Per liaison [facility],[address] Floor at [facility] accepted patient , obtained PRECERT for skilled care and do not need new covid test. PICC line in place. SW completed HENS. Arrangements made for pt to be transported to [address] [place] [address] at 1:30pm . Pt, teamlead and SNF liaison informed of arrangements. N-N [phone].  Tim OJEDA

## 2022-09-26 NOTE — PROGRESS NOTES
Physical Therapy Treatment Note/Plan of Care    Room #:  7622/0724-95  Patient Name: Marlon Sears  YOB: 1960  MRN: [id number]    Date of Service: 9/26/2022     Tentative placement recommendation: Subacute rehab  Equipment recommendation: To be determined      Evaluating Physical Therapist: Vernell Gauthier, PT #[id number]      Specific Provider Orders/Date/Referring Provider :  09/22/22 1200    PT eval and treat  Start:  09/22/22 1200,   End:  09/22/22 1200,   ONE TIME,   Standing Count:  1 Occurrences,   R       Comments:  NWB YASMANY Pereira MD     Admitting Diagnosis:   Lactic acidosis [E87.2]  Septicemia ([address] [A41.9]  Wound infection [T14. 8XXA, L08.9]  Wound cellulitis [L03.90]  Sepsis ([address] [A41.9]      concern for wound infection  Surgery:   DATE OF PROCEDURE:  09/21/2022  SURGEON:  Douglas Dempsey DPM  ASSISTANT:  Na Craven. PROCEDURES:  1. Partial excision of posterior calcaneus tuber. 2.  Sural artery adipomyofascial pedicle elevation and transfer. 3.  Delayed primary closure, left posterior foot and leg wounds. 4.  Application of skin substitute grafts, specifically bilayer graft.     Visit Diagnoses         Codes    Septicemia ([address]    -  Primary A41.9    Wound cellulitis     L03.90    Lactic acidosis     E87.2    Acute hematogenous osteomyelitis of left foot (HCC)     M86.072    Acute osteomyelitis of left foot ([address]     M86.172            Patient Active Problem List   Diagnosis    CVA (cerebral vascular accident) ([address]    Diabetes mellitus ([address]    Hyponatremia    Diabetic foot infection ([address]    Hypertension    Hemiparesis affecting left side as late effect of cerebrovascular accident ([address]    Peripheral arterial disease ([address]    Urinary tract infection due to Proteus    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 2 diabetes mellitus, with necrosis of bone (HCC)    Ulcer of left heel, with necrosis of muscle (HCC)    Atherosclerosis of native artery of left lower extremity with ulceration of heel (HCC)    Peripheral vascular angioplasty status    Ulcer of left heel, with necrosis of bone (HCC)    PVD (peripheral vascular disease) ([facility]    Osteomyelitis of left lower extremity ([facility]    Alcohol abuse    Sepsis due to group A beta-hemolytic Streptococcus ([facility]    Coronary artery disease involving native coronary artery of native heart without angina pectoris    Sepsis due to cellulitis ([facility]    Lactic acidosis    Uncontrolled type 2 diabetes mellitus with hyperglycemia ([facility])    Type 2 diabetes mellitus with left diabetic foot ulcer ([facility]    Bacteremia due to Gram-positive bacteria        ASSESSMENT of Current Deficits Patient exhibits decreased strength, balance, and endurance impairing functional mobility, transfers, gait , gait distance, and tolerance to activity history of cva with Left upper extremity weakness, unable to maintain non weight bearing Left lower extremity in standing on all 3 attempts. Patient was continually instructed and I demonstrated NWB on LLE but he could not stand without weight bearing through his left lower extremity. Patient requires continued skilled physical therapy to address concerns listed above for increased safety and function at discharge.         PHYSICAL THERAPY  PLAN OF CARE       Physical therapy plan of care is established based on physician order,  patient diagnosis and clinical assessment    Current Treatment Recommendations:    -Bed Mobility: Lower extremity exercises  and Upper extremity exercises   -Sitting Balance: Incorporate reaching activities to activate trunk muscles , Facilitate active trunk muscle engagement , Facilitate postural control in all planes , and Engage in core activities to allow for movement within base of support   -Standing Balance: Perform sit to stand activities maintaining NWB (non-weight bearing) weightbearing left lower extremity   -Transfers: Provide instruction on proper hand and right lower extremity placement to maintain NWB (non-weight bearing) weightbearing left lower extremity   -Endurance: Utilize Supervised activities to increase level of endurance to allow for safe functional mobility including transfers and gait     PT long term treatment goals are located in below grid    Patient and or family understand(s) diagnosis, prognosis, and plan of care. Frequency of treatments: Patient will be seen  daily.          Prior Level of Function: Patient ambulated with cane history CVA 2015  Rehab Potential: fair   for baseline    Past medical history:   Past Medical History:   Diagnosis Date    Arthritis     Bilateral carpal tunnel syndrome 2016    Cerebral artery occlusion with cerebral infarction [facility])     Chronic back pain     Coronary artery disease involving native coronary artery of native heart without angina pectoris 9/16/2022    CVA (cerebral vascular accident) ([address] 11/2015, 2017    Diabetes mellitus ([address]     Type 2    Diabetic foot ulcer with osteomyelitis ([address] 12/21/2021    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 2 diabetes mellitus, with necrosis of bone ([address] 12/21/2021    Hemiparesis affecting left side as late effect of cerebrovascular accident ([address]     LEFT SIDE NON DOMINANT FOLLOWING STROKE    Hyperlipidemia     Hypertension     Peripheral vascular angioplasty status 12/21/2021    Ulcer of left heel, with necrosis of bone ([address] 12/27/2021    Ulcer of left heel, with necrosis of muscle ([address] 12/21/2021    Vitamin D deficiency      Past Surgical History:   Procedure Laterality Date    CARPAL TUNNEL RELEASE  10/01/2016    Dr. Pierce Zulema Left 2/16/2021    LEFT FOOT DEBRIDEMENT WITH BONE BIOPSY, WOUND VAC APPLICATION performed by Bernardino Shaffer DPM at [address] [place] [address] Drive Left 9/14/2022    LEFT FOOT DEBRIDEMENT INCISION AND DRAINAGE performed by Bernardino Shaffer DPM at [address] [place] [address] Left 9/21/2022    LEFT FOOT PARTIAL CALCANECTOMY AND DEBRIDEMENT performed by Katey Yanez DPM at [address]    TRANSESOPHAGEAL ECHOCARDIOGRAM N/A 2/22/2021    TRANSESOPHAGEAL ECHOCARDIOGRAM WITH BUBBLE STUDY performed by Aaron Simon MD at [facility] N/A 1/4/2021    EGD BIOPSY performed by Aric Langston DO at [facility]:    Precautions: Bedrest with bathroom Privileges , falls, alarm, and contact isolation ,  history CVA 2015. Strict Non weight bearing to Left leg    Social history: Patient lives with brother  has MS bedbound in basement has caregiver;  in a ranch home  with 3 steps  to enter to kitchen with Rail 3 steps to bed/bath, to basement 12 steps with railing  Tub shower grab bars has 2 nurses daily     Equipment owned: Wheelchair, [place], Wheeled Walker, Bedside commode, and Shower chair,       [id number] [facility] Mobility Inpatient   How much difficulty turning over in bed?: A Little  How much difficulty sitting down on / standing up from a chair with arms?: A Lot  How much difficulty moving from lying on back to sitting on side of bed?: A Little  How much help from another person moving to and from a bed to a chair?: Total  How much help from another person needed to walk in hospital room?: Total  How much help from another person for climbing 3-5 steps with a railing?: Total  AM-PAC Inpatient Mobility Raw Score : 11  AM-PAC Inpatient T-Scale Score : 33.86  Mobility Inpatient CMS 0-100% Score: 72.57  Mobility Inpatient CMS G-Code Modifier : CL    Nursing cleared patient for PT treatment. OBJECTIVE;   Initial Evaluation  Date: 9/22/2022 Treatment Date:  9/26/2022       Short Term/ Long Term   Goals   Was pt agreeable to Eval/treatment? Yes yes To be met in 3 days   Pain level   0/10  neuropathy 8/10  Left foot    Bed Mobility    Rolling: Minimal assist of 1    Supine to sit: Minimal assist of 1    Sit to supine:  Moderate assist of 1    Scooting: Minimal assist of 1   Rolling: Minimal assist of 1   Supine to sit: Minimal assist of 1   Sit to supine: Minimal assist of 1   Scooting: Moderate assist of 1    Rolling: Independent    Supine to sit: Independent    Sit to supine: Independent    Scooting: Independent     Transfers Sit to stand: Moderate assist of 1 wheeled walker 1st rep, unable to maintain non weight bearing Left lower extremity immediately returned to siting; 2nd rep moderate assist of 2 to assist with walker control and stability, initially patient able to maintain non weight bearing Left lower extremity x 2-3 sec, however despite cueing unable to continue to maintain and immediately returned to sitting  Sit to stand: Moderate assist of 1 unable to adhere to NWB on LLE   Sit to stand:  Moderate assist of 1 non weight bearing Left lower extremity    ROM Within functional limits  except Left upper extremity and left ankle/foot  Increase range of motion 10% of affected joints    Strength BUE:  refer to OT eval  RLE:  4-/5  LLE:  3/5  Increase strength in affected mm groups by 1/3 grade   Balance Sitting EOB:  good -  Dynamic Standing:  poor wheeled walker  Sitting EOB: good   Dynamic Standing: poor unable to adhere to NWB on LLE using wheeled walker and moderate assist   Sitting EOB:  good    Dynamic Standing: fair wheeled walker      Patient is Alert & Oriented x person, place, time, and situation and follows directions    Sensation:  Patient  reports numbness/tingling and neuropathy left lower extremity     Edema:  yes left lower extremity   Endurance: fair       Vitals: room air   Blood Pressure at rest  Blood Pressure during session    Heart Rate at rest   Heart Rate during session      SPO2 at rest %  SPO2 during session  %     Patient education  Patient educated on role of Physical Therapy, risks of immobility, safety and plan of care,  importance of mobility while in hospital , ankle pumps, quad set and glut set for edema control, blood clot prevention, weight bearing status , and positioning for skin integrity and comfort     Patient response to education:   Pt verbalized understanding Pt demonstrated skill Pt requires further education in this area   Yes Partial Yes      Treatment:  Patient practiced and was instructed/facilitated in the following treatment: Patient performed supine exercises. Pt assisted to edge of bed,   Sat edge of bed 23 minutes with Supervision  to increase dynamic sitting balance and activity tolerance. Demonstrated and educated on NWB on LLE. Attempted to stand  x 3 reps but all 3 times he was weight bearing through his left lower extremity. We sat immediately back on the bed. Returned to bed. Therapeutic Exercises:  ankle pumps, quad sets, heel slide, hip abduction/adduction, straight leg raise, shoulder elevation, elbow flexion/extension, wrist flexion/extension, and finger flexion/extension,  x 15 reps. AAROM on LLE;  PROM/AAROM on LUE    At end of session, patient in bed with  .  call light and phone within reach,  all lines and tubes intact, nursing notified. Patient would benefit from continued skilled Physical Therapy to improve functional independence and quality of life. Patient's/ family goals   rehab    Time in  10:52  Time out  11:30    Total Treatment Time  38 minutes      CPT codes:    Therapeutic activities (45563)   24 minutes  2 unit(s)  Therapeutic exercises (00702)   14 minutes  1 unit(s)    Nahun Narayan  [facility]  LIC # [id number]

## 2022-09-26 NOTE — DISCHARGE SUMMARY
[facility] Physician Discharge Summary       Rober Michelle, DPM  [address] [facility] [id number]    Call in 1 week(s)      Lee Mitchell MD  [address] [facility] 1  [facility]  [phone]    Follow up      Lee Mitchell MD  [address] [facility] 1  [facility]  [phone]          Activity level: Nonweightbearing left lower extremity. Otherwise, with assistance    Diet: ADULT DIET; Regular; 3 carb choices (45 gm/meal)    Labs: Routine labs per primary care physician    Condition at discharge: Stable    Dispo: [facility]    Patient ID:  Mayte Alvarez  [id number]  60 y.o.  1960    Admit date: 9/13/2022    Discharge date and time:  9/26/2022  11:35 AM    Admission Diagnoses: Principal Problem:    Sepsis due to group A beta-hemolytic Streptococcus ([address]  Active Problems:    Diabetes mellitus ([address]    Osteomyelitis of left lower extremity ([address]    Alcohol abuse    Coronary artery disease involving native coronary artery of native heart without angina pectoris    Sepsis due to cellulitis ([address]    Lactic acidosis    Uncontrolled type 2 diabetes mellitus with hyperglycemia ([address]    Type 2 diabetes mellitus with left diabetic foot ulcer ([address]    Bacteremia due to Gram-positive bacteria    Hyponatremia    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 2 diabetes mellitus, with necrosis of bone ([address]  Resolved Problems:    * No resolved hospital problems.  *      Discharge Diagnoses: Principal Problem:    Sepsis due to group A beta-hemolytic Streptococcus ([address]  Active Problems:    Diabetes mellitus ([address]    Osteomyelitis of left lower extremity (HCC)    Alcohol abuse    Coronary artery disease involving native coronary artery of native heart without angina pectoris    Sepsis due to cellulitis (HCC)    Lactic acidosis    Uncontrolled type 2 diabetes mellitus with hyperglycemia (HCC)    Type 2 diabetes mellitus with left diabetic foot ulcer ([facility]    Bacteremia due to Gram-positive bacteria    Hyponatremia    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 2 diabetes mellitus, with necrosis of bone (HCC)  Resolved Problems:    * No resolved hospital problems. *      Consults:  IP CONSULT TO PODIATRY  IP CONSULT TO INFECTIOUS DISEASES    Procedures: Left calcaneus incision and drainage and left posterior leg incision and drainage on 9/14/2022. Left foot partial calcanectomy and debridement on 9/21/2022. Right brachial PICC line placement on 9/19/2022. Hospital Course: This is a 44-year-old male that was admitted to the hospital for sepsis due to group A strep bacteremia and left calcaneal osteomyelitis due to diabetic foot ulcer. He was admitted to monitored floor, treated with IV fluids and broad-spectrum antibiotics, and podiatry consulted for management of osteomyelitis. He had above-noted procedures. Infectious disease was consulted and recommended completing course of IV ceftriaxone and oral metronidazole which were prescribed on discharge. Also to note, patient had significant hyponatremia. He was drinking 6-8 beers per day prior to admission. On initial presentation, he was volume depleted and treated with IV fluids. IV fluids were subsequently discontinued as blood and urine chemistry suggested beer potomania, and his sodium spontaneously improved at an appropriate rate. Instructed patient to abstain from alcohol use. He did not experience any significant alcohol withdrawal syndrome during hospitalization.     Discharge Exam:  Vitals:    09/25/22 1924 09/26/22 0640 09/26/22 0909 09/26/22 1104   BP: 134/85  135/64    Pulse: 89  72    Resp: 16 17 18 18   Temp: 97.9 °F (36.6 °C)  98 °F (36.7 °C)    TempSrc: Oral  Oral    SpO2: 97%  96%    Weight:       Height:           General Appearance: alert and oriented to person, place and time and in no acute distress  Skin: warm and dry  Head: normocephalic and atraumatic  Eyes: pupils equal, round, and reactive to light, extraocular eye movements intact, conjunctivae normal  Neck: neck supple and non tender without mass   Pulmonary/Chest: Nonlabored on room air. Clear to auscultation bilaterally  Cardiovascular: normal rate, normal S1 and S2 and no carotid bruits  Abdomen: soft, non-tender, non-distended, normal bowel sounds, no masses or organomegaly  Extremities: Right upper extremity PICC line in place. Left lower extremity with postoperative dressing in place. No significant lower extremity edema, no cyanosis or clubbing  Neurologic: no cranial nerve deficit and speech normal.  No extremity tremors or tongue fasciculations  I/O last 3 completed shifts: In: 580 [P.O.:580]  Out: 2350 [Urine:2350]  I/O this shift:  In: -   Out: 200 [Urine:200]      LABS:  Recent Labs     09/24/22  0640 09/25/22  0702 09/26/22  0624   * 131* 130*   K 4.4 4.5 4.5    101 98   CO2 21* 21* 23   BUN 10 11 12   CREATININE 0.7 0.7 0.7   GLUCOSE 144* 121* 145*   CALCIUM 8.6 8.6 8.8       Recent Labs     09/24/22  0640 09/25/22  0702 09/26/22  0624   WBC 11.7* 11.3 10.6   RBC 3.32* 3.26* 3.36*   HGB 9.7* 9.3* 9.8*   HCT 29.5* 28.8* 29.6*   MCV 88.9 88.3 88.1   MCH 29.2 28.5 29.2   MCHC 32.9 32.3 33.1   RDW 15.7* 15.4* 15.4*   * 550* 636*   MPV 9.9 9.3 9.3         Imaging:  No results found. Patient Instructions:   Current Discharge Medication List        START taking these medications    Details   enoxaparin (LOVENOX) 40 MG/0.4ML Inject 0.4 mLs into the skin daily      miconazole (MICOTIN) 2 % powder Apply topically 2 times daily. Qty: 45 g, Refills: 1      oxyCODONE-acetaminophen (PERCOCET)  MG per tablet Take 1 tablet by mouth every 6 hours as needed for Pain for up to 3 days.  Intended supply: 30 days  Qty: 12 tablet, Refills: 0    Comments: Reduce doses taken as pain becomes manageable  Associated Diagnoses: Acute osteomyelitis of left foot ibuprofen (ADVIL;MOTRIN) 200 MG tablet Comments:   Reason for Stopping:         hydroCHLOROthiazide (HYDRODIURIL) 25 MG tablet Comments:   Reason for Stopping:                 Note that greater than 30 minutes was spent in preparing discharge papers, discussing discharge with patient, medication review, etc.    NOTE: This report was transcribed using voice recognition software. Every effort was made to ensure accuracy; however, inadvertent computerized transcription errors may be present.      Signed:  Electronically signed by Ella Castro DO on 9/26/2022 at 11:35 AM

## 2022-09-26 NOTE — DISCHARGE INSTR - DIET

## 2022-09-26 NOTE — PROGRESS NOTES
Attempted to change patients dressing this morning. Patient declined.  Explained the importance of the dressing change and the patient is still declining

## 2022-09-28 ENCOUNTER — OUTSIDE SERVICES (OUTPATIENT)
Dept: PRIMARY CARE CLINIC | Age: 62
End: 2022-09-28
Payer: COMMERCIAL

## 2022-09-28 DIAGNOSIS — I10 PRIMARY HYPERTENSION: ICD-10-CM

## 2022-09-28 DIAGNOSIS — I69.354 HEMIPARESIS AFFECTING LEFT SIDE AS LATE EFFECT OF CEREBROVASCULAR ACCIDENT (HCC): ICD-10-CM

## 2022-09-28 DIAGNOSIS — I70.244 ATHEROSCLEROSIS OF NATIVE ARTERY OF LEFT LOWER EXTREMITY WITH ULCERATION OF HEEL (HCC): ICD-10-CM

## 2022-09-28 DIAGNOSIS — L97.424 DIABETIC ULCER OF LEFT HEEL ASSOCIATED WITH TYPE 2 DIABETES MELLITUS, WITH NECROSIS OF BONE (HCC): Primary | Chronic | ICD-10-CM

## 2022-09-28 DIAGNOSIS — E11.621 DIABETIC ULCER OF LEFT HEEL ASSOCIATED WITH TYPE 2 DIABETES MELLITUS, WITH NECROSIS OF BONE (HCC): Primary | Chronic | ICD-10-CM

## 2022-09-28 DIAGNOSIS — I25.10 CORONARY ARTERY DISEASE INVOLVING NATIVE CORONARY ARTERY OF NATIVE HEART WITHOUT ANGINA PECTORIS: ICD-10-CM

## 2022-09-28 DIAGNOSIS — I63.9 CEREBROVASCULAR ACCIDENT (CVA), UNSPECIFIED MECHANISM (HCC): ICD-10-CM

## 2022-09-28 PROCEDURE — 99306 1ST NF CARE HIGH MDM 50: CPT | Performed by: INTERNAL MEDICINE

## 2022-10-17 ENCOUNTER — HOSPITAL ENCOUNTER (OUTPATIENT)
Dept: WOUND CARE | Age: 62
Discharge: HOME | End: 2022-10-17
Payer: COMMERCIAL

## 2022-10-17 VITALS
HEART RATE: 102 BPM | TEMPERATURE: 97 F | DIASTOLIC BLOOD PRESSURE: 70 MMHG | SYSTOLIC BLOOD PRESSURE: 160 MMHG | BODY MASS INDEX: 26.78 KG/M2 | WEIGHT: 192 LBS | RESPIRATION RATE: 18 BRPM

## 2022-10-17 DIAGNOSIS — E11.621 DIABETIC ULCER OF LEFT HEEL ASSOCIATED WITH TYPE 2 DIABETES MELLITUS, WITH NECROSIS OF BONE (HCC): ICD-10-CM

## 2022-10-17 DIAGNOSIS — L08.9 DIABETIC FOOT INFECTION (HCC): Primary | ICD-10-CM

## 2022-10-17 DIAGNOSIS — E11.628 DIABETIC FOOT INFECTION (HCC): Primary | ICD-10-CM

## 2022-10-17 DIAGNOSIS — L97.424 DIABETIC ULCER OF LEFT HEEL ASSOCIATED WITH TYPE 2 DIABETES MELLITUS, WITH NECROSIS OF BONE (HCC): ICD-10-CM

## 2022-10-17 LAB
FUNGUS (MYCOLOGY) CULTURE: NORMAL
FUNGUS STAIN: NORMAL

## 2022-10-17 PROCEDURE — 11042 DBRDMT SUBQ TIS 1ST 20SQCM/<: CPT

## 2022-10-17 PROCEDURE — 99213 OFFICE O/P EST LOW 20 MIN: CPT

## 2022-10-17 RX ORDER — GENTAMICIN SULFATE 1 MG/G
OINTMENT TOPICAL ONCE
Status: CANCELLED | OUTPATIENT
Start: 2022-10-17 | End: 2022-10-17

## 2022-10-17 RX ORDER — LIDOCAINE HYDROCHLORIDE 40 MG/ML
SOLUTION TOPICAL ONCE
Status: COMPLETED | OUTPATIENT
Start: 2022-10-17 | End: 2022-10-17

## 2022-10-17 RX ORDER — LIDOCAINE HYDROCHLORIDE 40 MG/ML
SOLUTION TOPICAL ONCE
Status: CANCELLED | OUTPATIENT
Start: 2022-10-17 | End: 2022-10-17

## 2022-10-17 RX ORDER — CLOBETASOL PROPIONATE 0.5 MG/G
OINTMENT TOPICAL ONCE
Status: CANCELLED | OUTPATIENT
Start: 2022-10-17 | End: 2022-10-17

## 2022-10-17 RX ORDER — BETAMETHASONE DIPROPIONATE 0.05 %
OINTMENT (GRAM) TOPICAL ONCE
Status: CANCELLED | OUTPATIENT
Start: 2022-10-17 | End: 2022-10-17

## 2022-10-17 RX ORDER — BACITRACIN, NEOMYCIN, POLYMYXIN B 400; 3.5; 5 [USP'U]/G; MG/G; [USP'U]/G
OINTMENT TOPICAL ONCE
Status: CANCELLED | OUTPATIENT
Start: 2022-10-17 | End: 2022-10-17

## 2022-10-17 RX ORDER — BACITRACIN ZINC AND POLYMYXIN B SULFATE 500; 1000 [USP'U]/G; [USP'U]/G
OINTMENT TOPICAL ONCE
Status: CANCELLED | OUTPATIENT
Start: 2022-10-17 | End: 2022-10-17

## 2022-10-17 RX ORDER — LIDOCAINE 40 MG/G
CREAM TOPICAL ONCE
Status: CANCELLED | OUTPATIENT
Start: 2022-10-17 | End: 2022-10-17

## 2022-10-17 RX ORDER — LIDOCAINE HYDROCHLORIDE 20 MG/ML
JELLY TOPICAL ONCE
Status: CANCELLED | OUTPATIENT
Start: 2022-10-17 | End: 2022-10-17

## 2022-10-17 RX ORDER — LIDOCAINE 50 MG/G
OINTMENT TOPICAL ONCE
Status: CANCELLED | OUTPATIENT
Start: 2022-10-17 | End: 2022-10-17

## 2022-10-17 RX ORDER — GINSENG 100 MG
CAPSULE ORAL ONCE
Status: CANCELLED | OUTPATIENT
Start: 2022-10-17 | End: 2022-10-17

## 2022-10-17 RX ADMIN — LIDOCAINE HYDROCHLORIDE 10 ML: 40 SOLUTION TOPICAL at 13:14

## 2022-10-17 NOTE — DISCHARGE INSTRUCTIONS
Visit Discharge/Physician Orders     Assessment of pain at discharge: moderate     Anesthetic used: 4% liquid lidocaine solution     Discharge to:     Left via:ambulance     Accompanied by: self     ECF/HHA: [facility] Orders:  staples/sutures removed today. Cleanse left heel ulcer with normal saline solution, apply alginate, abd and secure with kerlix. Change daily     Treatment Orders:   Wear prevalon boots or heel ayo while in bed. May be partial weight bearing up to 50% on left with surgical shoe and walker. [address] [place] [address],[address] followup visit: _________1 week Dr Thu Muñoz ____________  (Please note your next appointment above and if you are unable to keep, kindly give a 24 hour notice. Thank you.)     Physician signature:__________________________        If you experience any of the following, please call the [facility] during business hours:     * Increase in Pain  * Temperature over 101  * Increase in drainage from your wound  * Drainage with a foul odor  * Bleeding  * Increase in swelling  * Need for compression bandage changes due to slippage, breakthrough drainage. If you need medical attention outside of the business hours of the [facility] [address] please contact your PCP or go to the nearest emergency room.

## 2022-10-17 NOTE — PLAN OF CARE
Problem: Chronic Conditions and Co-morbidities  Goal: Patient's chronic conditions and co-morbidity symptoms are monitored and maintained or improved  Outcome: Progressing     Problem: Discharge Planning  Goal: Discharge to home or other facility with appropriate resources  Outcome: Progressing     Problem: Nutrition Deficit:  Goal: Optimize nutritional status  Outcome: Progressing     Problem: Pain  Goal: Verbalizes/displays adequate comfort level or baseline comfort level  Outcome: Progressing

## 2022-10-17 NOTE — PROGRESS NOTES
Wound Healing Center Followup Visit Note    Referring Physician : Aury Bradford MD  [id number] [facility] RECORD NUMBER:  [id number]  AGE: 60 y.o. GENDER: male  : 1960  EPISODE DATE:  10/17/2022    Subjective:     Chief Complaint   Patient presents with    Wound Check     Left heel        HISTORY of PRESENT ILLNESS HPI   Eliud Nina is a 60 y.o. male who presents today in regards to follow up evaluation and treatment of wound/ulcer. That patient's past medical, family and social hx were reviewed and changes were made if present. History of Wound Context:  The patient has had a wound of bilateral heels which was first noted approximately over a month ago. This has been treated with surgical debridement. On their initial visit to the wound healing center, 21 ,  the patient has noted that the wound has not been improving. The patient has had similar previous wounds in the past.       Pt is not on abx at time of initial visit. 3/22/21 - Wound VAC MWF continue to left heel. Aquacel Ag to right heel. Debrided toenails 1-5 in thickness and length. FU 1 week. IV Abx per Dr. Natacha Muñoz. 21 - Wound VAC MWF to left heel. Cultures obtained. FU 1 week after visit with Dr. Natacha Muñoz. PO Abx.  21 - DC wound VAC to left heel. Alginate to wound. FU 1 week  21 - Alginate and gentamicin ointment QOD. Partial WB to heel at 50% to foot with surgical shoe and walker. 5/3/21 - Alginate and gentamicin ointment QOD. MRI left heel     5/10/21 - Alginate and gentamicin ointment QOD. MRI left heel pending. 21 - Alginate and gentamicin ointment QOD. MRI reviewed confirming OM. Discussed HBOT referral and partial calcanectomy. He opts for HBOT consultation. 21 - Alginate and add gentamicin ointment QD. HBOT referral.  Patient hold off on partial calcanectomy. FU 1 week     21 - Alginate and add gentamicin ointment QD.   HBOT referral.  Patient hold off on partial calcanectomy. FU 1 week     6/28/21 - Alginate and add gentamicin ointment QD. HBOT referral.  Patient hold off on partial calcanectomy. FU 1 week. Patient gave me consent (verbal) to speak with his brother Florencio Rivera regarding his at home arrangement. HBOT is on hold until patient is at home. 7/12/21 -  Alginate and add gentamicin ointment QD. HBOT referral.  Patient considering partial calcanectomy with flap. FU 1 week. 7/19/21 - Alginate and add gentamicin ointment QD. HBOT referral.  Patient considering partial calcanectomy with flap. FU 1 week. 8/2/21 - Alginate and add gentamicin ointment QD. HBOT referral.  Patient considering partial calcanectomy with flap. FU 1 week. 8/16/21 - Alginate and add gentamicin ointment QD. HBOT referral.  Patient considering partial calcanectomy with flap. FU 1 week. 8/23/21 - Alginate and add gentamicin ointment QD. HBOT referral.  Patient considering partial calcanectomy with flap. FU 1 week. 8/30/21 - Alginate and add gentamicin ointment QD. HBOT referral.  Patient considering partial calcanectomy with flap. FU 1 week. 9/20/21 - Alginate and add gentamicin ointment QD. HBOT referral.  Patient considering partial calcanectomy with flap. FU 1 week. 10/25/21 Alginate and add gentamicin ointment QD. HBOT referral.  Patient considering partial calcanectomy with flap. FU 1 week. 11/1/21 Alginate and add gentamicin ointment QD. HBOT referral.  Patient considering partial calcanectomy with flap. FU 1 week. 11/8/21 - Alginate and add gentamicin ointment QD. HBOT referral.  Patient considering partial calcanectomy with flap. FU 1 week. 11/15/21 - Alginate and add gentamicin ointment QD. HBOT referral.  Patient considering partial calcanectomy with flap. FU 1 week. 11/22/21 -  Alginate and add gentamicin ointment QD. HBOT referral.  Patient considering partial calcanectomy with flap.   FU 1 week.      11/29/21 0 Alginate and gentamicin ointment. Possible DC home. HBOT referral.  Patient considering partial calcanectomy with flap. FU 1 week. 12/6/21 - Alginate and gentamicin ointment. Possible DC home. HBOT referral.  Patient considering partial calcanectomy with flap. FU 1 week. 12/13/21 - Alginate and gentamicin ointment. DC home. HBOT referral.  Magen Mustafa referral for wound care and PT for GAIT training and safe DME use. Patient considering partial calcanectomy with flap. FU 1 week. 12/20/21 -  Alginate and gentamicin ointment. DC home. HBOT referral.     12/27/21 - HBOT consultation complete - to start HBOT after new year. 2-14-22 patient presents today for evaluation of a left heel wound. Patient transferred here to [facility] due to the fact that he is doing HBO, for convenience due to transportation. He was prior following with Dr. Delores Sousa. Patient overall doing well, no complaints. Tolerating dressings as well as hyperbarics. Physical exam demonstrates vascular intact. Wound appreciated posterior aspect with areas of devitalized nonviable tissue with beefy tissue noted as well. There is no purulence, odor, erythema or increase in temperature. Currently no exposed bone. 2-24-22  Patient overall doing well, no complaints. Tolerating dressings as well as hyperbarics. Physical exam demonstrates vascular intact. Wound appreciated posterior aspect with areas of devitalized nonviable tissue with beefy tissue noted as well. There is no purulence, odor, erythema or increase in temperature. 3-3-22 presents in follow-up. Relates dark area on his left great toenail as well as \"swelling\"  Left lower leg that he noticed recently. Patient denies any known trauma. Patient currently takes aspirin as well as Plavix. Physical exam demonstrates vascular intact.   Wound appreciated posterior aspect with areas of devitalized nonviable tissue with beefy tissue noted as well. There is no purulence, odor, erythema or increase in temperature. Left great toenail proximal lateral small area, subungual hematoma, stable. 2 fluctuant areas adjacent left lower leg anterior lateral aspect. There is no erythema or increase in temperature. After oral consent under sterile technique and utilizing 1% lidocaine plain, 21-gauge needle as well as 11 blade stab incision expressing approximately 20 cc of hematoma formation, this was cultured. Monitor closely if he notices any recurrence recommend [facility] for further evaluation    3-28-22 last seen March 3rd, transportation issues. Physical exam, vascular intact. Wound appreciated posterior aspect left heel with areas of devitalized nonviable tissue, increasing/seen. There is no purulence, odor, erythema or increase in temperature. Change treatment to Santyl. Patient would benefit from continued HBO.    4-4-22 Physical exam, vascular intact. Wound appreciated posterior aspect left heel with areas of devitalized nonviable tissue w/ areas of beefy tissue. There is no purulence, odor, erythema or increase in temperature. Santyl. HBO. Improved at present. 4-11-22 Wound appreciated posterior aspect left heel with areas of devitalized nonviable tissue w/ areas of beefy tissue. There is no purulence, odor, erythema or increase in temperature. Santyl. HBO. 4-18-22 Wound appreciated posterior aspect left heel with areas of devitalized nonviable tissue w/ areas of beefy tissue. There is no purulence, odor, erythema or increase in temperature. Santyl. HBO. Discussed possible graft in near future, will get updated vasc studies.     5/9/2022  Patient did not come last 2 weeks and also did not go for hyperbaric, last week, because of transportation problems  Patient tells me at one time his podiatrist Ashley Alarcon informed him that he may consider skin grafting of the wound  On today's examination, I can literally feel the bone, but looks fairly clean  Patient is trying to continue offloading the heel ulcer, and also continuing the hyperbaric oxygen therapy treatments  The last lower extremity artery Doppler study done in 2021 revealed almost triphasic right ankle Doppler tracings and biphasic left ankle Doppler tracings with adequate flow to the heel and foot for potential tissue healing  Patient was rescheduled for repeat proximal artery Doppler study by his podiatrist, not done yet I do not see it in the epic  Inform the patient that last week I discussed with Dr. Carlo Koch regarding continuing the hyperbaric oxygen therapy, possibility of skin grafting  Discussed with patient and nursing staff, please schedule appointment for the patient to see Dr. Carlo Koch for his input pending return to have his podiatrist back to the wound care center for further management   we will make additional recommendations once the lower extremity artery Doppler studies completed  All his questions were answered  5/18/22  Concern that arterial flow is not adequate based off exam today  Left dp weakly biphasic, PT monophasic  Will hold hbo for now - reevaluate post angio  Discussed angiogram - he had previous with Dr Johanna Romero which addressed his left sfa, pop with plasty using 6x250 DCB impact  Concern that tibial disease is the primary issue vs. Previous intervention has shut down  5/25/22  Angio 5/31  Wound stable heel  5/31/22  L SFA, pop atherectomy, plasty with 5x250  L PT plasty 3x80, 2x120  6/22/22   Wound larger but bone covered with granulation tissue  L DP strongly biphasic, PT biphasic, no right groin hematoma  aquacell ag  Culture  Consideration for graft in future  Consideration for hbo in future  6/29/22  Anciboacter bumani light growth - tx with bactrim  Wound dose appear better  7/13/22  Wound smaller  Appearance better  Pt having transportation issues - will transition him back to [facility] also have reassessed for hbo at [facility]  7/25/22  Wound smaller  Appearance better  Cultures taken. X-rays right foot repeated    8/8/22 - X-rays pending. Continue current regimen for now. FU 1 week. HBOT continue    8/15/22 - X-rays show signs of OM. MRI ordered. Continue current regimen for now. FU 1 week. HBOT continue    8/22/22- MRI pending. Continue current regimen for now. FU 1 week. HBOT continue    9/12/22 - MRI confirmed OM. Recommend OR debridement going forward. Patient will think about this and let us know next week. 10/17/22 - wound healed. Removed sutures.   Posterior ankle wound noted - healthy - alginate daily    Wound/Ulcer Pain Timing/Severity: none  Quality of pain: N/A  Severity:  0 / 10   Modifying Factors: None  Associated Signs/Symptoms: none     Ulcer Identification:  Ulcer Type: arterial and diabetic  Contributing Factors: diabetes     Diabetic/Pressure/Non Pressure Ulcers onl y:        PAST MEDICAL HISTORY      Diagnosis Date    Arthritis     Bilateral carpal tunnel syndrome 2016    Cerebral artery occlusion with cerebral infarction [facility])     Chronic back pain     Coronary artery disease involving native coronary artery of native heart without angina pectoris 9/16/2022    CVA (cerebral vascular accident) ([address] 11/2015, 2017    Diabetes mellitus ([address]     Type 2    Diabetic foot ulcer with osteomyelitis ([address] 12/21/2021    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 2 diabetes mellitus, with necrosis of bone ([address] 12/21/2021    Hemiparesis affecting left side as late effect of cerebrovascular accident ([address]     LEFT SIDE NON DOMINANT FOLLOWING STROKE    Hyperlipidemia     Hypertension     Peripheral vascular angioplasty status 12/21/2021    Ulcer of left heel, with necrosis of bone ([address] 12/27/2021    Ulcer of left heel, with necrosis of muscle ([address] 12/21/2021    Vitamin D deficiency      Past Surgical History:   Procedure Laterality Date    CARPAL TUNNEL RELEASE 10/01/2016    Dr. Stephy Smiley Left 2021    LEFT FOOT DEBRIDEMENT WITH BONE BIOPSY, WOUND VAC APPLICATION performed by Nita Echevarria DPM at [facility] Left 2022    LEFT FOOT DEBRIDEMENT INCISION AND DRAINAGE performed by Nita Echevarria DPM at [facility] Left 2022    LEFT FOOT PARTIAL CALCANECTOMY AND DEBRIDEMENT performed by Nita Echevarria DPM at [address]    TRANSESOPHAGEAL ECHOCARDIOGRAM N/A 2021    TRANSESOPHAGEAL ECHOCARDIOGRAM WITH BUBBLE STUDY performed by Chente Chicas MD at [address] 2021    EGD BIOPSY performed by Carlitos Whitlock DO at [facility] reviewed. No pertinent family history. Social History     Tobacco Use    Smoking status: Former     Packs/day: 1.00     Years: 25.00     Pack years: 25.00     Types: Cigarettes     Quit date: 2015     Years since quittin.7    Smokeless tobacco: Never   Vaping Use    Vaping Use: Never used   Substance Use Topics    Alcohol use: Not Currently     Comment: Former every day drinker for most of adult life    Drug use: No     Allergies   Allergen Reactions    Pcn [Penicillins] Anaphylaxis     Current Outpatient Medications on File Prior to Encounter   Medication Sig Dispense Refill    cyclobenzaprine (FLEXERIL) 5 MG tablet take 1 tablet by mouth nightly if needed for muscle spasm 30 tablet 2    glimepiride (AMARYL) 2 MG tablet Take 1 tablet by mouth every morning (before breakfast) 30 tablet 2    metFORMIN (GLUCOPHAGE) 500 MG tablet Take 1 tablet by mouth in the morning and 1 tablet in the evening. Take with meals. 60 tablet 3    atorvastatin (LIPITOR) 40 MG tablet Take 1 tablet by mouth nightly 30 tablet 3    enoxaparin (LOVENOX) 40 MG/0.4ML Inject 0.4 mLs into the skin daily      miconazole (MICOTIN) 2 % powder Apply topically 2 times daily.  45 g 1 metroNIDAZOLE (FLAGYL) 500 MG tablet Take 1 tablet by mouth every 8 hours 126 tablet 0    cefTRIAXone (ROCEPHIN) infusion Infuse 2,000 mg intravenously every 24 hours Compound per protocol 84 g 0    gabapentin (NEURONTIN) 100 MG capsule Take 3 capsules by mouth 2 times daily for 30 days. 60 capsule 2    diphenhydrAMINE (BENADRYL) 25 MG capsule Take 25 mg by mouth every 6 hours as needed for Itching      B Complex-C-E-Zn (STRESS B/ZINC) TABS Take 1 tablet by mouth daily      acetaminophen (TYLENOL) 325 MG tablet Take 650 mg by mouth every 6 hours as needed for Pain      ferrous sulfate (IRON 325) 325 (65 Fe) MG tablet Take 325 mg by mouth daily (with breakfast)      vitamin D (ERGOCALCIFEROL) 1.25 MG (72452 UT) CAPS capsule Take 1 capsule by mouth once a week 5 capsule 0    melatonin 3 MG TABS tablet Take 9 mg by mouth nightly as needed      aspirin 81 MG EC tablet Take 81 mg by mouth daily      clopidogrel (PLAVIX) 75 MG tablet Take 1 tablet by mouth daily 30 tablet 3     No current facility-administered medications on file prior to encounter.        REVIEW OF SYSTEMS See HPI    Objective:    BP (!) 160/70   Pulse (!) 102   Temp 97 °F (36.1 °C) (Tympanic)   Resp 18   Wt 192 lb (87.1 kg)   BMI 26.78 kg/m²   Wt Readings from Last 3 Encounters:   10/17/22 192 lb (87.1 kg)   09/14/22 192 lb (87.1 kg)   09/12/22 192 lb (87.1 kg)     PHYSICAL EXAM  CONSTITUTIONAL:   Awake, alert, cooperative   EYES:  lids and lashes normal   ENT: external ears and nose without lesions   NECK:  supple, symmetrical, trachea midline   SKIN:  Open wound     Assessment:     Problem List Items Addressed This Visit       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 2 diabetes mellitus, with necrosis of bone (HCC) (Chronic)    Relevant Orders    Initiate Outpatient Wound Care Protocol    Diabetic foot infection ([facility] - Primary    Relevant Orders    Initiate Outpatient Wound Care Protocol       Pre Debridement Measurements:  Are located in the [place]  Documentation Flow Sheet  Post Debridement Measurements:  Wound/Ulcer Descriptions are Pre Debridement except measurements:     Wound 12/26/20 Arm Left (Active)   Number of days: 659       Wound 02/15/21 Heel Left (Active)   Number of days: 609       Wound 02/15/21 Heel Right (Active)   Number of days: 609       Wound 03/08/21 Heel Left #1 left heel aquired 12/1/20 (Active)   Wound Image   10/17/22 1310   Wound Etiology Diabetic 02/14/22 1344   Dressing Status Clean;Dry; Intact 09/26/22 1117   Wound Cleansed Cleansed with saline 09/26/22 1117   Dressing/Treatment Ace wrap 09/26/22 1117   Offloading for Diabetic Foot Ulcers Post op shoe 09/26/22 1117   Wound Length (cm) 21 cm 10/17/22 1310   Wound Width (cm) 0.9 cm 10/17/22 1310   Wound Depth (cm) 0.5 cm 10/17/22 1310   Wound Surface Area (cm^2) 18.9 cm^2 10/17/22 1310   Change in Wound Size % (l*w) -56.33 10/17/22 1310   Wound Volume (cm^3) 9.45 cm^3 10/17/22 1310   Wound Healing % -12 10/17/22 1310   Post-Procedure Length (cm) 5.1 cm 09/12/22 1402   Post-Procedure Width (cm) 3.2 cm 09/12/22 1402   Post-Procedure Depth (cm) 1.2 cm 09/12/22 1402   Post-Procedure Surface Area (cm^2) 16.32 cm^2 09/12/22 1402   Post-Procedure Volume (cm^3) 19.584 cm^3 09/12/22 1402   Undermining Starts ___ O'Clock 9 05/25/22 1329   Undermining Ends___ O'Clock 3 05/25/22 1329   Undermining Maxium Distance (cm) [email or web address] 05/25/22 1329   Wound Assessment Fibrin;Pink/red 10/17/22 1310   Drainage Amount Moderate 10/17/22 1310   Drainage Description Serosanguinous 10/17/22 1310   Odor None 10/17/22 1310   Adrianne-wound Assessment Intact 10/17/22 1310   Number of days: 588       Wound 03/08/21 Heel Right #2 rt heel aquired 12/1/20 (Active)   Wound Image   04/05/21 0929   Dressing Status New dressing applied 03/18/21 1352   Wound Cleansed Cleansed with saline 04/01/21 1536   Dressing/Treatment Alginate;Collagen;Silicone border 59/68/40 1536   Offloading for Diabetic Foot Ulcers Post op shoe 04/01/21 1536   Wound Length (cm) 0 cm 04/05/21 0929   Wound Width (cm) 0 cm 04/05/21 0929   Wound Depth (cm) 0 cm 04/05/21 0929   Wound Surface Area (cm^2) 0 cm^2 04/05/21 0929   Change in Wound Size % (l*w) 100 04/05/21 0929   Wound Volume (cm^3) 0 cm^3 04/05/21 0929   Wound Healing % 100 04/05/21 0929   Post-Procedure Length (cm) 0 cm 04/05/21 1011   Post-Procedure Width (cm) 0 cm 04/05/21 1011   Post-Procedure Depth (cm) 0 cm 04/05/21 1011   Post-Procedure Surface Area (cm^2) 0 cm^2 04/05/21 1011   Post-Procedure Volume (cm^3) 0 cm^3 04/05/21 1011   Wound Assessment Fibrin 04/01/21 1357   Drainage Amount None 04/01/21 1357   Drainage Description Yellow 03/22/21 0911   Odor None 04/01/21 1357   Adrianne-wound Assessment Maceration 04/01/21 1357   Number of days: 588     Incision 02/19/21 Groin Right (Active)   Number of days: 605       Procedure Note  Indications:  Based on my examination of this patient's wound(s)/ulcer(s) today, debridement is required to promote healing and evaluate the wound base. Performed by: Natalie Lazaro DPM    Consent obtained:  Yes    Time out taken:  Yes    Pain Control: Anesthetic  Anesthetic: 4% Lidocaine Liquid Topical     Debridement:Excisional Debridement    Using curette the wound(s)/ulcer(s) was/were sharply debrided down through and including the removal of epidermis, dermis and subcutaneous tissue. Devitalized Tissue Debrided:  fibrin, biofilm, slough and exudate to stimulate bleeding to promote healing, post debridement good bleeding base and wound edges noted    Wound/Ulcer #: 1    Percent of Wound/Ulcer Debrided: 100%    Total Surface Area Debrided:  18.9 sq cm     Estimated Blood Loss:  Minimal  Hemostasis Achieved:  by pressure    Procedural Pain:  4  / 10   Post Procedural Pain:  2 / 10     Response to treatment:  Well tolerated by patient.        Plan:   Treatment Note please see attached Discharge Instructions    Written patient dismissal instructions given to patient and signed by patient or POA. Discharge Instructions         Visit Discharge/Physician Orders     Assessment of pain at discharge: moderate     Anesthetic used: 4% liquid lidocaine solution     Discharge to:     Left via:ambulance     Accompanied by: self     ECF/HHA: [facility] Orders:  staples/sutures removed today. Cleanse left heel ulcer with normal saline solution, apply alginate, abd and secure with kerlix. Change daily     Treatment Orders:   Wear prevalon boots or heel ayo while in bed. May be partial weight bearing up to 50% on left with surgical shoe and walker. [facility] followup visit: _________1 week Dr Afshan Chang ____________  (Please note your next appointment above and if you are unable to keep, kindly give a 24 hour notice. Thank you.)     Physician signature:__________________________        If you experience any of the following, please call the [email or web address] during business hours:     * Increase in Pain  * Temperature over 101  * Increase in drainage from your wound  * Drainage with a foul odor  * Bleeding  * Increase in swelling  * Need for compression bandage changes due to slippage, breakthrough drainage. If you need medical attention outside of the business hours of the [email or web address] please contact your PCP or go to the nearest emergency room.                           Electronically signed by Ej Sen DPM on 10/17/2022 at 1:43 PM

## 2022-10-18 NOTE — DISCHARGE INSTRUCTIONS
Visit Discharge/Physician Orders     Assessment of pain at discharge: moderate     Anesthetic used: 4% liquid lidocaine solution     Discharge to:     Left via:ambulance     Accompanied by: self     ECF/HHA: [facility] Orders:  staples/sutures removed today. Cleanse left heel ulcer with normal saline solution, apply alginate, abd and secure with kerlix. Change daily     Treatment Orders:   Wear prevalon boots or heel ayo while in bed. May be partial weight bearing up to 50% on left with surgical shoe and walker. [address] [place] [address],[address] followup visit: _________1 week Dr Caron Del Angel ____________  (Please note your next appointment above and if you are unable to keep, kindly give a 24 hour notice. Thank you.)     Physician signature:__________________________        If you experience any of the following, please call the [facility] [address] during business hours:     * Increase in Pain  * Temperature over 101  * Increase in drainage from your wound  * Drainage with a foul odor  * Bleeding  * Increase in swelling  * Need for compression bandage changes due to slippage, breakthrough drainage. If you need medical attention outside of the business hours of the [facility] [address] please contact your PCP or go to the nearest emergency room.

## 2022-10-19 ENCOUNTER — OUTSIDE SERVICES (OUTPATIENT)
Dept: PRIMARY CARE CLINIC | Age: 62
End: 2022-10-19
Payer: COMMERCIAL

## 2022-10-19 DIAGNOSIS — E11.9 TYPE 2 DIABETES MELLITUS WITHOUT COMPLICATION, WITHOUT LONG-TERM CURRENT USE OF INSULIN (HCC): Primary | Chronic | ICD-10-CM

## 2022-10-19 DIAGNOSIS — I70.244 ATHEROSCLEROSIS OF NATIVE ARTERY OF LEFT LOWER EXTREMITY WITH ULCERATION OF HEEL (HCC): ICD-10-CM

## 2022-10-19 DIAGNOSIS — I63.9 CEREBROVASCULAR ACCIDENT (CVA), UNSPECIFIED MECHANISM (HCC): ICD-10-CM

## 2022-10-19 DIAGNOSIS — Z98.62 PERIPHERAL VASCULAR ANGIOPLASTY STATUS: ICD-10-CM

## 2022-10-19 PROCEDURE — 99307 SBSQ NF CARE SF MDM 10: CPT | Performed by: INTERNAL MEDICINE

## 2022-10-24 ENCOUNTER — HOSPITAL ENCOUNTER (OUTPATIENT)
Dept: WOUND CARE | Age: 62
Discharge: HOME | End: 2022-10-24
Payer: COMMERCIAL

## 2022-10-24 VITALS
SYSTOLIC BLOOD PRESSURE: 151 MMHG | HEART RATE: 90 BPM | RESPIRATION RATE: 18 BRPM | DIASTOLIC BLOOD PRESSURE: 65 MMHG | TEMPERATURE: 98.1 F

## 2022-10-24 DIAGNOSIS — L97.424 DIABETIC ULCER OF LEFT HEEL ASSOCIATED WITH TYPE 2 DIABETES MELLITUS, WITH NECROSIS OF BONE (HCC): ICD-10-CM

## 2022-10-24 DIAGNOSIS — L08.9 DIABETIC FOOT INFECTION (HCC): Primary | ICD-10-CM

## 2022-10-24 DIAGNOSIS — E11.628 DIABETIC FOOT INFECTION (HCC): Primary | ICD-10-CM

## 2022-10-24 DIAGNOSIS — E11.621 DIABETIC ULCER OF LEFT HEEL ASSOCIATED WITH TYPE 2 DIABETES MELLITUS, WITH NECROSIS OF BONE (HCC): ICD-10-CM

## 2022-10-24 PROCEDURE — 11042 DBRDMT SUBQ TIS 1ST 20SQCM/<: CPT

## 2022-10-24 RX ORDER — LIDOCAINE HYDROCHLORIDE 20 MG/ML
JELLY TOPICAL ONCE
Status: CANCELLED | OUTPATIENT
Start: 2022-10-24 | End: 2022-10-24

## 2022-10-24 RX ORDER — GINSENG 100 MG
CAPSULE ORAL ONCE
Status: CANCELLED | OUTPATIENT
Start: 2022-10-24 | End: 2022-10-24

## 2022-10-24 RX ORDER — LIDOCAINE HYDROCHLORIDE 40 MG/ML
SOLUTION TOPICAL ONCE
Status: COMPLETED | OUTPATIENT
Start: 2022-10-24 | End: 2022-10-24

## 2022-10-24 RX ORDER — GENTAMICIN SULFATE 1 MG/G
OINTMENT TOPICAL ONCE
Status: CANCELLED | OUTPATIENT
Start: 2022-10-24 | End: 2022-10-24

## 2022-10-24 RX ORDER — BETAMETHASONE DIPROPIONATE 0.05 %
OINTMENT (GRAM) TOPICAL ONCE
Status: CANCELLED | OUTPATIENT
Start: 2022-10-24 | End: 2022-10-24

## 2022-10-24 RX ORDER — LIDOCAINE HYDROCHLORIDE 40 MG/ML
SOLUTION TOPICAL ONCE
Status: CANCELLED | OUTPATIENT
Start: 2022-10-24 | End: 2022-10-24

## 2022-10-24 RX ORDER — LIDOCAINE 40 MG/G
CREAM TOPICAL ONCE
Status: CANCELLED | OUTPATIENT
Start: 2022-10-24 | End: 2022-10-24

## 2022-10-24 RX ORDER — LIDOCAINE 50 MG/G
OINTMENT TOPICAL ONCE
Status: CANCELLED | OUTPATIENT
Start: 2022-10-24 | End: 2022-10-24

## 2022-10-24 RX ORDER — BACITRACIN, NEOMYCIN, POLYMYXIN B 400; 3.5; 5 [USP'U]/G; MG/G; [USP'U]/G
OINTMENT TOPICAL ONCE
Status: CANCELLED | OUTPATIENT
Start: 2022-10-24 | End: 2022-10-24

## 2022-10-24 RX ORDER — BACITRACIN ZINC AND POLYMYXIN B SULFATE 500; 1000 [USP'U]/G; [USP'U]/G
OINTMENT TOPICAL ONCE
Status: CANCELLED | OUTPATIENT
Start: 2022-10-24 | End: 2022-10-24

## 2022-10-24 RX ORDER — CLOBETASOL PROPIONATE 0.5 MG/G
OINTMENT TOPICAL ONCE
Status: CANCELLED | OUTPATIENT
Start: 2022-10-24 | End: 2022-10-24

## 2022-10-24 RX ADMIN — LIDOCAINE HYDROCHLORIDE 10 ML: 40 SOLUTION TOPICAL at 13:12

## 2022-10-24 NOTE — PROGRESS NOTES
Wound Healing Center Followup Visit Note    Referring Physician : Lenard Gamez MD  [id number] [facility] RECORD NUMBER:  [id number]  AGE: 60 y.o. GENDER: male  : 1960  EPISODE DATE:  10/24/2022    Subjective:     Chief Complaint   Patient presents with    Wound Check     Left foot        HISTORY of PRESENT ILLNESS HPI   Patito Cuellar is a 60 y.o. male who presents today in regards to follow up evaluation and treatment of wound/ulcer. That patient's past medical, family and social hx were reviewed and changes were made if present. History of Wound Context:  The patient has had a wound of bilateral heels which was first noted approximately over a month ago. This has been treated with surgical debridement. On their initial visit to the wound healing center, 21 ,  the patient has noted that the wound has not been improving. The patient has had similar previous wounds in the past.       Pt is not on abx at time of initial visit. 3/22/21 - Wound VAC MWF continue to left heel. Aquacel Ag to right heel. Debrided toenails 1-5 in thickness and length. FU 1 week. IV Abx per Dr. Ocampo. 21 - Wound VAC MWF to left heel. Cultures obtained. FU 1 week after visit with Dr. Ocampo. PO Abx.  21 - DC wound VAC to left heel. Alginate to wound. FU 1 week  21 - Alginate and gentamicin ointment QOD. Partial WB to heel at 50% to foot with surgical shoe and walker. 5/3/21 - Alginate and gentamicin ointment QOD. MRI left heel     5/10/21 - Alginate and gentamicin ointment QOD. MRI left heel pending. 21 - Alginate and gentamicin ointment QOD. MRI reviewed confirming OM. Discussed HBOT referral and partial calcanectomy. He opts for HBOT consultation. 21 - Alginate and add gentamicin ointment QD. HBOT referral.  Patient hold off on partial calcanectomy. FU 1 week     21 - Alginate and add gentamicin ointment QD.   HBOT referral.  Patient hold off on partial calcanectomy. FU 1 week     6/28/21 - Alginate and add gentamicin ointment QD. HBOT referral.  Patient hold off on partial calcanectomy. FU 1 week. Patient gave me consent (verbal) to speak with his brother Kath Pacheco regarding his at home arrangement. HBOT is on hold until patient is at home. 7/12/21 -  Alginate and add gentamicin ointment QD. HBOT referral.  Patient considering partial calcanectomy with flap. FU 1 week. 7/19/21 - Alginate and add gentamicin ointment QD. HBOT referral.  Patient considering partial calcanectomy with flap. FU 1 week. 8/2/21 - Alginate and add gentamicin ointment QD. HBOT referral.  Patient considering partial calcanectomy with flap. FU 1 week. 8/16/21 - Alginate and add gentamicin ointment QD. HBOT referral.  Patient considering partial calcanectomy with flap. FU 1 week. 8/23/21 - Alginate and add gentamicin ointment QD. HBOT referral.  Patient considering partial calcanectomy with flap. FU 1 week. 8/30/21 - Alginate and add gentamicin ointment QD. HBOT referral.  Patient considering partial calcanectomy with flap. FU 1 week. 9/20/21 - Alginate and add gentamicin ointment QD. HBOT referral.  Patient considering partial calcanectomy with flap. FU 1 week. 10/25/21 Alginate and add gentamicin ointment QD. HBOT referral.  Patient considering partial calcanectomy with flap. FU 1 week. 11/1/21 Alginate and add gentamicin ointment QD. HBOT referral.  Patient considering partial calcanectomy with flap. FU 1 week. 11/8/21 - Alginate and add gentamicin ointment QD. HBOT referral.  Patient considering partial calcanectomy with flap. FU 1 week. 11/15/21 - Alginate and add gentamicin ointment QD. HBOT referral.  Patient considering partial calcanectomy with flap. FU 1 week. 11/22/21 -  Alginate and add gentamicin ointment QD. HBOT referral.  Patient considering partial calcanectomy with flap.   FU 1 week.      11/29/21 0 Alginate and gentamicin ointment. Possible DC home. HBOT referral.  Patient considering partial calcanectomy with flap. FU 1 week. 12/6/21 - Alginate and gentamicin ointment. Possible DC home. HBOT referral.  Patient considering partial calcanectomy with flap. FU 1 week. 12/13/21 - Alginate and gentamicin ointment. DC home. HBOT referral.  [facility]  referral for wound care and PT for GAIT training and safe DME use. Patient considering partial calcanectomy with flap. FU 1 week. 12/20/21 -  Alginate and gentamicin ointment. DC home. HBOT referral.     12/27/21 - HBOT consultation complete - to start HBOT after new year. 2-14-22 patient presents today for evaluation of a left heel wound. Patient transferred here to [facility] wound care center [place] due to the fact that he is doing HBO, for convenience due to transportation. He was prior following with Dr. Romy Garcia. Patient overall doing well, no complaints. Tolerating dressings as well as hyperbarics. Physical exam demonstrates vascular intact. Wound appreciated posterior aspect with areas of devitalized nonviable tissue with beefy tissue noted as well. There is no purulence, odor, erythema or increase in temperature. Currently no exposed bone. 2-24-22  Patient overall doing well, no complaints. Tolerating dressings as well as hyperbarics. Physical exam demonstrates vascular intact. Wound appreciated posterior aspect with areas of devitalized nonviable tissue with beefy tissue noted as well. There is no purulence, odor, erythema or increase in temperature. 3-3-22 presents in follow-up. Relates dark area on his left great toenail as well as \"swelling\"  Left lower leg that he noticed recently. Patient denies any known trauma. Patient currently takes aspirin as well as Plavix. Physical exam demonstrates vascular intact.   Wound appreciated posterior aspect with areas of devitalized nonviable tissue with beefy tissue noted as well. There is no purulence, odor, erythema or increase in temperature. Left great toenail proximal lateral small area, subungual hematoma, stable. 2 fluctuant areas adjacent left lower leg anterior lateral aspect. There is no erythema or increase in temperature. After oral consent under sterile technique and utilizing 1% lidocaine plain, 21-gauge needle as well as 11 blade stab incision expressing approximately 20 cc of hematoma formation, this was cultured. Monitor closely if he notices any recurrence recommend [facility] for further evaluation    3-28-22 last seen March 3rd, transportation issues. Physical exam, vascular intact. Wound appreciated posterior aspect left heel with areas of devitalized nonviable tissue, increasing/seen. There is no purulence, odor, erythema or increase in temperature. Change treatment to Santyl. Patient would benefit from continued HBO.    4-4-22 Physical exam, vascular intact. Wound appreciated posterior aspect left heel with areas of devitalized nonviable tissue w/ areas of beefy tissue. There is no purulence, odor, erythema or increase in temperature. Santyl. HBO. Improved at present. 4-11-22 Wound appreciated posterior aspect left heel with areas of devitalized nonviable tissue w/ areas of beefy tissue. There is no purulence, odor, erythema or increase in temperature. Santyl. HBO. 4-18-22 Wound appreciated posterior aspect left heel with areas of devitalized nonviable tissue w/ areas of beefy tissue. There is no purulence, odor, erythema or increase in temperature. Santyl. HBO. Discussed possible graft in near future, will get updated vasc studies.     5/9/2022  Patient did not come last 2 weeks and also did not go for hyperbaric, last week, because of transportation problems  Patient tells me at one time his podiatrist Dalila Black informed him that he may consider skin grafting of the wound  On today's examination, I can literally feel the bone, but looks fairly clean  Patient is trying to continue offloading the heel ulcer, and also continuing the hyperbaric oxygen therapy treatments  The last lower extremity artery Doppler study done in 2021 revealed almost triphasic right ankle Doppler tracings and biphasic left ankle Doppler tracings with adequate flow to the heel and foot for potential tissue healing  Patient was rescheduled for repeat proximal artery Doppler study by his podiatrist, not done yet I do not see it in the epic  Inform the patient that last week I discussed with Dr. Kurtis Ren regarding continuing the hyperbaric oxygen therapy, possibility of skin grafting  Discussed with patient and nursing staff, please schedule appointment for the patient to see Dr. Kurtis Ren for his input pending return to have his podiatrist back to the wound care center for further management   we will make additional recommendations once the lower extremity artery Doppler studies completed  All his questions were answered  5/18/22  Concern that arterial flow is not adequate based off exam today  Left dp weakly biphasic, PT monophasic  Will hold hbo for now - reevaluate post angio  Discussed angiogram - he had previous with Dr Grisel Smyth which addressed his left sfa, pop with plasty using 6x250 DCB impact  Concern that tibial disease is the primary issue vs. Previous intervention has shut down  5/25/22  Angio 5/31  Wound stable heel  5/31/22  L SFA, pop atherectomy, plasty with 5x250  L PT plasty 3x80, 2x120  6/22/22   Wound larger but bone covered with granulation tissue  L DP strongly biphasic, PT biphasic, no right groin hematoma  aquacell ag  Culture  Consideration for graft in future  Consideration for hbo in future  6/29/22  Anciboacter bumani light growth - tx with bactrim  Wound dose appear better  7/13/22  Wound smaller  Appearance better  Pt having transportation issues - will transition him back to Donny [facility] at [facility] also have reassessed for hbo at [facility]  7/25/22  Wound smaller  Appearance better  Cultures taken. X-rays right foot repeated    8/8/22 - X-rays pending. Continue current regimen for now. FU 1 week. HBOT continue    8/15/22 - X-rays show signs of OM. MRI ordered. Continue current regimen for now. FU 1 week. HBOT continue    8/22/22- MRI pending. Continue current regimen for now. FU 1 week. HBOT continue    9/12/22 - MRI confirmed OM. Recommend OR debridement going forward. Patient will think about this and let us know next week. 10/17/22 - wound healed. Removed sutures. Posterior ankle wound noted - healthy - alginate daily    10/24/22 - Alginate daily for now.     Wound/Ulcer Pain Timing/Severity: none  Quality of pain: N/A  Severity:  0 / 10   Modifying Factors: None  Associated Signs/Symptoms: none     Ulcer Identification:  Ulcer Type: arterial and diabetic  Contributing Factors: diabetes     Diabetic/Pressure/Non Pressure Ulcers onl y:        PAST MEDICAL HISTORY      Diagnosis Date    Arthritis     Bilateral carpal tunnel syndrome 2016    Cerebral artery occlusion with cerebral infarction [facility])     Chronic back pain     Coronary artery disease involving native coronary artery of native heart without angina pectoris 9/16/2022    CVA (cerebral vascular accident) ([address] 11/2015, 2017    Diabetes mellitus ([address]     Type 2    Diabetic foot ulcer with osteomyelitis ([address] 12/21/2021    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 2 diabetes mellitus, with necrosis of bone ([address] 12/21/2021    Hemiparesis affecting left side as late effect of cerebrovascular accident ([address]     LEFT SIDE NON DOMINANT FOLLOWING STROKE    Hyperlipidemia     Hypertension     Peripheral vascular angioplasty status 12/21/2021    Ulcer of left heel, with necrosis of bone ([address] 12/27/2021    Ulcer of left heel, with necrosis of muscle ([address] 12/21/2021    Vitamin D deficiency      Past Surgical History:   Procedure Laterality Date    CARPAL TUNNEL RELEASE  10/01/2016    Dr. Verona Brar Left 2021    LEFT FOOT DEBRIDEMENT WITH BONE BIOPSY, WOUND VAC APPLICATION performed by Jenn Naranjo DPM at [facility] [address] Left 2022    LEFT FOOT DEBRIDEMENT INCISION AND DRAINAGE performed by Jenn Naranjo DPM at [facility] [address] Left 2022    LEFT FOOT PARTIAL CALCANECTOMY AND DEBRIDEMENT performed by Jenn Naranjo DPM at [address]    TRANSESOPHAGEAL ECHOCARDIOGRAM N/A 2021    TRANSESOPHAGEAL ECHOCARDIOGRAM WITH BUBBLE STUDY performed by Cindy Jesisca MD at [facility] 2021    EGD BIOPSY performed by Ivy Gandhi DO at [facility] ENDOSCOPY     No family history on file. Social History     Tobacco Use    Smoking status: Former     Packs/day: 1.00     Years: 25.00     Pack years: 25.00     Types: Cigarettes     Quit date: 2015     Years since quittin.8    Smokeless tobacco: Never   Vaping Use    Vaping Use: Never used   Substance Use Topics    Alcohol use: Not Currently     Comment: Former every day drinker for most of adult life    Drug use: No     Allergies   Allergen Reactions    Pcn [Penicillins] Anaphylaxis     Current Outpatient Medications on File Prior to Encounter   Medication Sig Dispense Refill    cyclobenzaprine (FLEXERIL) 5 MG tablet take 1 tablet by mouth nightly if needed for muscle spasm 30 tablet 2    glimepiride (AMARYL) 2 MG tablet Take 1 tablet by mouth every morning (before breakfast) 30 tablet 2    metFORMIN (GLUCOPHAGE) 500 MG tablet Take 1 tablet by mouth in the morning and 1 tablet in the evening. Take with meals. 60 tablet 3    atorvastatin (LIPITOR) 40 MG tablet Take 1 tablet by mouth nightly 30 tablet 3    enoxaparin (LOVENOX) 40 MG/0.4ML Inject 0.4 mLs into the skin daily      miconazole (MICOTIN) 2 % powder Apply topically 2 times daily. 45 g 1    metroNIDAZOLE (FLAGYL) 500 MG tablet Take 1 tablet by mouth every 8 hours 126 tablet 0    cefTRIAXone (ROCEPHIN) infusion Infuse 2,000 mg intravenously every 24 hours Compound per protocol 84 g 0    gabapentin (NEURONTIN) 100 MG capsule Take 3 capsules by mouth 2 times daily for 30 days. 60 capsule 2    diphenhydrAMINE (BENADRYL) 25 MG capsule Take 25 mg by mouth every 6 hours as needed for Itching      B Complex-C-E-Zn (STRESS B/ZINC) TABS Take 1 tablet by mouth daily      acetaminophen (TYLENOL) 325 MG tablet Take 650 mg by mouth every 6 hours as needed for Pain      ferrous sulfate (IRON 325) 325 (65 Fe) MG tablet Take 325 mg by mouth daily (with breakfast)      vitamin D (ERGOCALCIFEROL) 1.25 MG (24577 UT) CAPS capsule Take 1 capsule by mouth once a week 5 capsule 0    melatonin 3 MG TABS tablet Take 9 mg by mouth nightly as needed      aspirin 81 MG EC tablet Take 81 mg by mouth daily      clopidogrel (PLAVIX) 75 MG tablet Take 1 tablet by mouth daily 30 tablet 3     No current facility-administered medications on file prior to encounter.        REVIEW OF SYSTEMS See HPI    Objective:    BP (!) 151/65   Pulse 90   Temp 98.1 °F (36.7 °C) (Temporal)   Resp 18   Wt Readings from Last 3 Encounters:   10/17/22 192 lb (87.1 kg)   09/14/22 192 lb (87.1 kg)   09/12/22 192 lb (87.1 kg)     PHYSICAL EXAM  CONSTITUTIONAL:   Awake, alert, cooperative   EYES:  lids and lashes normal   ENT: external ears and nose without lesions   NECK:  supple, symmetrical, trachea midline   SKIN:  Open wound     Assessment:     Problem List Items Addressed This Visit       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 2 diabetes mellitus, with necrosis of bone (HCC) (Chronic)    Relevant Orders    Initiate Outpatient Wound Care Protocol    Diabetic foot infection ([facility] - Primary    Relevant Orders    Initiate Outpatient Wound Care Protocol       Pre Debridement Measurements:  Are located in the [place]  Documentation Flow Sheet  Post Debridement Measurements:  Wound/Ulcer Descriptions are Pre Debridement except measurements:     Wound 12/26/20 Arm Left (Active)   Number of days: 666       Wound 02/15/21 Heel Left (Active)   Number of days: 616       Wound 02/15/21 Heel Right (Active)   Number of days: 616       Wound 03/08/21 Heel Left #1 left heel aquired 12/1/20 (Active)   Wound Image   10/17/22 1310   Wound Etiology Diabetic 02/14/22 1344   Dressing Status New dressing applied 10/17/22 1413   Wound Cleansed Cleansed with saline 09/26/22 1117   Dressing/Treatment Alginate;ABD 10/17/22 1413   Offloading for Diabetic Foot Ulcers Post op shoe 10/17/22 1413   Wound Length (cm) 14 cm 10/24/22 1305   Wound Width (cm) 0.8 cm 10/24/22 1305   Wound Depth (cm) 0.5 cm 10/24/22 1305   Wound Surface Area (cm^2) 11.2 cm^2 10/24/22 1305   Change in Wound Size % (l*w) 7.36 10/24/22 1305   Wound Volume (cm^3) 5.6 cm^3 10/24/22 1305   Wound Healing % 34 10/24/22 1305   Post-Procedure Length (cm) 14.1 cm 10/24/22 1328   Post-Procedure Width (cm) 0.9 cm 10/24/22 1328   Post-Procedure Depth (cm) 0.6 cm 10/24/22 1328   Post-Procedure Surface Area (cm^2) 12.69 cm^2 10/24/22 1328   Post-Procedure Volume (cm^3) 7.614 cm^3 10/24/22 1328   Undermining Starts ___ O'Clock 9 05/25/22 1329   Undermining Ends___ O'Clock 3 05/25/22 1329   Undermining Maxium Distance (cm) [email or web address] 05/25/22 1329   Wound Assessment Fibrin;Pink/red 10/24/22 1305   Drainage Amount Moderate 10/24/22 1305   Drainage Description Serosanguinous 10/24/22 1305   Odor None 10/24/22 1305   Adrianne-wound Assessment Intact 10/24/22 1305   Number of days: 595       Wound 03/08/21 Heel Right #2 rt heel aquired 12/1/20 (Active)   Wound Image   04/05/21 0929   Dressing Status New dressing applied 03/18/21 1352   Wound Cleansed Cleansed with saline 04/01/21 1536   Dressing/Treatment Alginate;Collagen;Silicone border 65/31/95 1536   Offloading for Diabetic Foot Ulcers Post op shoe 04/01/21 1536   Wound Length (cm) 0 cm 04/05/21 0929   Wound Width (cm) 0 cm 04/05/21 0929   Wound Depth (cm) 0 cm 04/05/21 0929   Wound Surface Area (cm^2) 0 cm^2 04/05/21 0929   Change in Wound Size % (l*w) 100 04/05/21 0929   Wound Volume (cm^3) 0 cm^3 04/05/21 0929   Wound Healing % 100 04/05/21 0929   Post-Procedure Length (cm) 0 cm 04/05/21 1011   Post-Procedure Width (cm) 0 cm 04/05/21 1011   Post-Procedure Depth (cm) 0 cm 04/05/21 1011   Post-Procedure Surface Area (cm^2) 0 cm^2 04/05/21 1011   Post-Procedure Volume (cm^3) 0 cm^3 04/05/21 1011   Wound Assessment Fibrin 04/01/21 1357   Drainage Amount None 04/01/21 1357   Drainage Description Yellow 03/22/21 0911   Odor None 04/01/21 1357   Adrianne-wound Assessment Maceration 04/01/21 1357   Number of days: 595     Incision 02/19/21 Groin Right (Active)   Number of days: 612       Procedure Note  Indications:  Based on my examination of this patient's wound(s)/ulcer(s) today, debridement is required to promote healing and evaluate the wound base. Performed by: Noemi Shields DPM    Consent obtained:  Yes    Time out taken:  Yes    Pain Control: Anesthetic  Anesthetic: 4% Lidocaine Liquid Topical     Debridement:Excisional Debridement    Using curette the wound(s)/ulcer(s) was/were sharply debrided down through and including the removal of epidermis, dermis and subcutaneous tissue. Devitalized Tissue Debrided:  fibrin, biofilm, slough and exudate to stimulate bleeding to promote healing, post debridement good bleeding base and wound edges noted    Wound/Ulcer #: 1    Percent of Wound/Ulcer Debrided: 100%    Total Surface Area Debrided:  12.69 sq cm     Estimated Blood Loss:  Minimal  Hemostasis Achieved:  by pressure    Procedural Pain:  4  / 10   Post Procedural Pain:  2 / 10     Response to treatment:  Well tolerated by patient.        Plan:   Treatment Note please see attached Discharge Instructions    Written patient dismissal instructions given to patient and signed by patient or POA. Discharge Instructions         Visit Discharge/Physician Orders     Assessment of pain at discharge: moderate     Anesthetic used: 4% liquid lidocaine solution     Discharge to:     Left via:ambulance     Accompanied by: self     ECF/HHA: [facility] Orders:  staples/sutures removed today. Cleanse left heel ulcer with normal saline solution, apply alginate, abd and secure with kerlix. Change daily     Treatment Orders:   Wear prevalon boots or heel ayo while in bed. May be partial weight bearing up to 50% on left with surgical shoe and walker. [address] [place] [address],[address] followup visit: _________1 week Dr Pretty Wall ____________  (Please note your next appointment above and if you are unable to keep, kindly give a 24 hour notice. Thank you.)     Physician signature:__________________________        If you experience any of the following, please call the [facility] during business hours:     * Increase in Pain  * Temperature over 101  * Increase in drainage from your wound  * Drainage with a foul odor  * Bleeding  * Increase in swelling  * Need for compression bandage changes due to slippage, breakthrough drainage. If you need medical attention outside of the business hours of the [address] please contact your PCP or go to the nearest emergency room.            Electronically signed by Natalie Lazaro DPM on 10/24/2022 at 1:55 PM

## 2022-10-24 NOTE — PLAN OF CARE
Problem: Chronic Conditions and Co-morbidities  Goal: Patient's chronic conditions and co-morbidity symptoms are monitored and maintained or improved  Outcome: Progressing     Problem: Chronic Conditions and Co-morbidities  Goal: Patient's chronic conditions and co-morbidity symptoms are monitored and maintained or improved  Outcome: Progressing     Problem: Pain  Goal: Verbalizes/displays adequate comfort level or baseline comfort level  Outcome: Progressing     Problem: Safety - Adult  Goal: Free from fall injury  Outcome: Progressing

## 2022-10-24 NOTE — PLAN OF CARE
Problem: Chronic Conditions and Co-morbidities  Goal: Patient's chronic conditions and co-morbidity symptoms are monitored and maintained or improved  10/24/2022 1317 by Figueroa Gómez RN  Outcome: Progressing  10/24/2022 1317 by Figueroa Gómez RN  Outcome: Progressing     Problem: Chronic Conditions and Co-morbidities  Goal: Patient's chronic conditions and co-morbidity symptoms are monitored and maintained or improved  10/24/2022 1317 by Figueroa Gómez RN  Outcome: Progressing  10/24/2022 1317 by Figueroa Gómez RN  Outcome: Progressing     Problem: Pain  Goal: Verbalizes/displays adequate comfort level or baseline comfort level  10/24/2022 1317 by Figueroa Gómez RN  Outcome: Progressing  10/24/2022 1317 by Figueroa Gómez RN  Outcome: Progressing     Problem: Safety - Adult  Goal: Free from fall injury  10/24/2022 1317 by Figueroa Gómez RN  Outcome: Progressing  10/24/2022 1317 by Figueroa Gómez RN  Outcome: Progressing

## 2022-10-25 LAB
AFB CULTURE (MYCOBACTERIA): NORMAL
AFB SMEAR: NORMAL

## 2022-10-25 NOTE — DISCHARGE INSTRUCTIONS
Visit Discharge/Physician Orders     Assessment of pain at discharge: moderate     Anesthetic used: 4% liquid lidocaine solution     Discharge to:     Left via:ambulance     Accompanied by: self     ECF/HHA: [facility] Orders:  Cleanse left heel ulcer with normal saline solution, apply alginate, abd and secure with kerlix. Change daily     Treatment Orders:   Wear prevalon boots or heel ayo while in bed. May be partial weight bearing up to 50% on left with surgical shoe and walker. [address] [place] [address],[address] followup visit: _________1 week Dr Mariano Oscar ____________  (Please note your next appointment above and if you are unable to keep, kindly give a 24 hour notice. Thank you.)     Physician signature:__________________________        If you experience any of the following, please call the [facility] during business hours:     * Increase in Pain  * Temperature over 101  * Increase in drainage from your wound  * Drainage with a foul odor  * Bleeding  * Increase in swelling  * Need for compression bandage changes due to slippage, breakthrough drainage. If you need medical attention outside of the business hours of the [facility] please contact your PCP or go to the nearest emergency room.

## 2022-10-31 ENCOUNTER — HOSPITAL ENCOUNTER (OUTPATIENT)
Dept: WOUND CARE | Age: 62
Discharge: HOME | End: 2022-10-31
Payer: COMMERCIAL

## 2022-10-31 VITALS
WEIGHT: 192 LBS | HEIGHT: 71 IN | DIASTOLIC BLOOD PRESSURE: 95 MMHG | RESPIRATION RATE: 18 BRPM | TEMPERATURE: 98.1 F | SYSTOLIC BLOOD PRESSURE: 199 MMHG | BODY MASS INDEX: 26.88 KG/M2 | HEART RATE: 84 BPM

## 2022-10-31 DIAGNOSIS — L08.9 DIABETIC FOOT INFECTION (HCC): ICD-10-CM

## 2022-10-31 DIAGNOSIS — E11.628 DIABETIC FOOT INFECTION (HCC): ICD-10-CM

## 2022-10-31 DIAGNOSIS — L97.424 DIABETIC ULCER OF LEFT HEEL ASSOCIATED WITH TYPE 2 DIABETES MELLITUS, WITH NECROSIS OF BONE (HCC): Primary | ICD-10-CM

## 2022-10-31 DIAGNOSIS — E11.621 DIABETIC ULCER OF LEFT HEEL ASSOCIATED WITH TYPE 2 DIABETES MELLITUS, WITH NECROSIS OF BONE (HCC): Primary | ICD-10-CM

## 2022-10-31 PROCEDURE — 11042 DBRDMT SUBQ TIS 1ST 20SQCM/<: CPT

## 2022-10-31 RX ORDER — LIDOCAINE HYDROCHLORIDE 20 MG/ML
JELLY TOPICAL ONCE
Status: CANCELLED | OUTPATIENT
Start: 2022-10-31 | End: 2022-10-31

## 2022-10-31 RX ORDER — LIDOCAINE HYDROCHLORIDE 40 MG/ML
SOLUTION TOPICAL ONCE
Status: COMPLETED | OUTPATIENT
Start: 2022-10-31 | End: 2022-10-31

## 2022-10-31 RX ORDER — BACITRACIN ZINC AND POLYMYXIN B SULFATE 500; 1000 [USP'U]/G; [USP'U]/G
OINTMENT TOPICAL ONCE
Status: CANCELLED | OUTPATIENT
Start: 2022-10-31 | End: 2022-10-31

## 2022-10-31 RX ORDER — LIDOCAINE HYDROCHLORIDE 40 MG/ML
SOLUTION TOPICAL ONCE
Status: CANCELLED | OUTPATIENT
Start: 2022-10-31 | End: 2022-10-31

## 2022-10-31 RX ORDER — LIDOCAINE 40 MG/G
CREAM TOPICAL ONCE
Status: CANCELLED | OUTPATIENT
Start: 2022-10-31 | End: 2022-10-31

## 2022-10-31 RX ORDER — CLOBETASOL PROPIONATE 0.5 MG/G
OINTMENT TOPICAL ONCE
Status: CANCELLED | OUTPATIENT
Start: 2022-10-31 | End: 2022-10-31

## 2022-10-31 RX ORDER — GENTAMICIN SULFATE 1 MG/G
OINTMENT TOPICAL ONCE
Status: CANCELLED | OUTPATIENT
Start: 2022-10-31 | End: 2022-10-31

## 2022-10-31 RX ORDER — LIDOCAINE 50 MG/G
OINTMENT TOPICAL ONCE
Status: CANCELLED | OUTPATIENT
Start: 2022-10-31 | End: 2022-10-31

## 2022-10-31 RX ORDER — GINSENG 100 MG
CAPSULE ORAL ONCE
Status: CANCELLED | OUTPATIENT
Start: 2022-10-31 | End: 2022-10-31

## 2022-10-31 RX ORDER — BACITRACIN, NEOMYCIN, POLYMYXIN B 400; 3.5; 5 [USP'U]/G; MG/G; [USP'U]/G
OINTMENT TOPICAL ONCE
Status: CANCELLED | OUTPATIENT
Start: 2022-10-31 | End: 2022-10-31

## 2022-10-31 RX ORDER — BETAMETHASONE DIPROPIONATE 0.05 %
OINTMENT (GRAM) TOPICAL ONCE
Status: CANCELLED | OUTPATIENT
Start: 2022-10-31 | End: 2022-10-31

## 2022-10-31 RX ADMIN — LIDOCAINE HYDROCHLORIDE 10 ML: 40 SOLUTION TOPICAL at 13:04

## 2022-10-31 NOTE — PROGRESS NOTES
Wound Healing Center Followup Visit Note    Referring Physician : Rosa Loza MD  [id number] RECORD NUMBER:  [id number]  AGE: 60 y.o. GENDER: male  : 1960  EPISODE DATE:  10/31/2022    Subjective:     Chief Complaint   Patient presents with    Wound Check     Left foot        HISTORY of PRESENT ILLNESS HPI   Rut Zuñiga is a 60 y.o. male who presents today in regards to follow up evaluation and treatment of wound/ulcer. That patient's past medical, family and social hx were reviewed and changes were made if present. History of Wound Context:  The patient has had a wound of bilateral heels which was first noted approximately over a month ago. This has been treated with surgical debridement. On their initial visit to the wound healing center, 21 ,  the patient has noted that the wound has not been improving. The patient has had similar previous wounds in the past.       Pt is not on abx at time of initial visit. 3/22/21 - Wound VAC MWF continue to left heel. Aquacel Ag to right heel. Debrided toenails 1-5 in thickness and length. FU 1 week. IV Abx per Dr. Oli Nowak. 21 - Wound VAC MWF to left heel. Cultures obtained. FU 1 week after visit with Dr. Oli Nowak. PO Abx.  21 - DC wound VAC to left heel. Alginate to wound. FU 1 week  21 - Alginate and gentamicin ointment QOD. Partial WB to heel at 50% to foot with surgical shoe and walker. 5/3/21 - Alginate and gentamicin ointment QOD. MRI left heel     5/10/21 - Alginate and gentamicin ointment QOD. MRI left heel pending. 21 - Alginate and gentamicin ointment QOD. MRI reviewed confirming OM. Discussed HBOT referral and partial calcanectomy. He opts for HBOT consultation. 21 - Alginate and add gentamicin ointment QD. HBOT referral.  Patient hold off on partial calcanectomy. FU 1 week     21 - Alginate and add gentamicin ointment QD.   HBOT referral.  Patient hold off on partial calcanectomy. FU 1 week     6/28/21 - Alginate and add gentamicin ointment QD. HBOT referral.  Patient hold off on partial calcanectomy. FU 1 week. Patient gave me consent (verbal) to speak with his brother Nichole Suárez regarding his at home arrangement. HBOT is on hold until patient is at home. 7/12/21 -  Alginate and add gentamicin ointment QD. HBOT referral.  Patient considering partial calcanectomy with flap. FU 1 week. 7/19/21 - Alginate and add gentamicin ointment QD. HBOT referral.  Patient considering partial calcanectomy with flap. FU 1 week. 8/2/21 - Alginate and add gentamicin ointment QD. HBOT referral.  Patient considering partial calcanectomy with flap. FU 1 week. 8/16/21 - Alginate and add gentamicin ointment QD. HBOT referral.  Patient considering partial calcanectomy with flap. FU 1 week. 8/23/21 - Alginate and add gentamicin ointment QD. HBOT referral.  Patient considering partial calcanectomy with flap. FU 1 week. 8/30/21 - Alginate and add gentamicin ointment QD. HBOT referral.  Patient considering partial calcanectomy with flap. FU 1 week. 9/20/21 - Alginate and add gentamicin ointment QD. HBOT referral.  Patient considering partial calcanectomy with flap. FU 1 week. 10/25/21 Alginate and add gentamicin ointment QD. HBOT referral.  Patient considering partial calcanectomy with flap. FU 1 week. 11/1/21 Alginate and add gentamicin ointment QD. HBOT referral.  Patient considering partial calcanectomy with flap. FU 1 week. 11/8/21 - Alginate and add gentamicin ointment QD. HBOT referral.  Patient considering partial calcanectomy with flap. FU 1 week. 11/15/21 - Alginate and add gentamicin ointment QD. HBOT referral.  Patient considering partial calcanectomy with flap. FU 1 week. 11/22/21 -  Alginate and add gentamicin ointment QD. HBOT referral.  Patient considering partial calcanectomy with flap.   FU 1 week.      11/29/21 0 Alginate and gentamicin ointment. Possible DC home. HBOT referral.  Patient considering partial calcanectomy with flap. FU 1 week. 12/6/21 - Alginate and gentamicin ointment. Possible DC home. HBOT referral.  Patient considering partial calcanectomy with flap. FU 1 week. 12/13/21 - Alginate and gentamicin ointment. DC home. HBOT referral.  [facility] referral for wound care and PT for GAIT training and safe DME use. Patient considering partial calcanectomy with flap. FU 1 week. 12/20/21 -  Alginate and gentamicin ointment. DC home. HBOT referral.     12/27/21 - HBOT consultation complete - to start HBOT after new year. 2-14-22 patient presents today for evaluation of a left heel wound. Patient transferred here to [facility] wound care center [place] due to the fact that he is doing HBO, for convenience due to transportation. He was prior following with Dr. Vasyl Díaz. Patient overall doing well, no complaints. Tolerating dressings as well as hyperbarics. Physical exam demonstrates vascular intact. Wound appreciated posterior aspect with areas of devitalized nonviable tissue with beefy tissue noted as well. There is no purulence, odor, erythema or increase in temperature. Currently no exposed bone. 2-24-22  Patient overall doing well, no complaints. Tolerating dressings as well as hyperbarics. Physical exam demonstrates vascular intact. Wound appreciated posterior aspect with areas of devitalized nonviable tissue with beefy tissue noted as well. There is no purulence, odor, erythema or increase in temperature. 3-3-22 presents in follow-up. Relates dark area on his left great toenail as well as \"swelling\"  Left lower leg that he noticed recently. Patient denies any known trauma. Patient currently takes aspirin as well as Plavix. Physical exam demonstrates vascular intact.   Wound appreciated posterior aspect with areas of devitalized nonviable tissue with beefy tissue noted as well. There is no purulence, odor, erythema or increase in temperature. Left great toenail proximal lateral small area, subungual hematoma, stable. 2 fluctuant areas adjacent left lower leg anterior lateral aspect. There is no erythema or increase in temperature. After oral consent under sterile technique and utilizing 1% lidocaine plain, 21-gauge needle as well as 11 blade stab incision expressing approximately 20 cc of hematoma formation, this was cultured. Monitor closely if he notices any recurrence recommend [facility] for further evaluation    3-28-22 last seen March 3rd, transportation issues. Physical exam, vascular intact. Wound appreciated posterior aspect left heel with areas of devitalized nonviable tissue, increasing/seen. There is no purulence, odor, erythema or increase in temperature. Change treatment to Santyl. Patient would benefit from continued HBO.    4-4-22 Physical exam, vascular intact. Wound appreciated posterior aspect left heel with areas of devitalized nonviable tissue w/ areas of beefy tissue. There is no purulence, odor, erythema or increase in temperature. Santyl. HBO. Improved at present. 4-11-22 Wound appreciated posterior aspect left heel with areas of devitalized nonviable tissue w/ areas of beefy tissue. There is no purulence, odor, erythema or increase in temperature. Santyl. HBO. 4-18-22 Wound appreciated posterior aspect left heel with areas of devitalized nonviable tissue w/ areas of beefy tissue. There is no purulence, odor, erythema or increase in temperature. Santyl. HBO. Discussed possible graft in near future, will get updated vasc studies.     5/9/2022  Patient did not come last 2 weeks and also did not go for hyperbaric, last week, because of transportation problems  Patient tells me at one time his podiatrist Verta Habermann informed him that he may consider skin grafting of the wound  On today's examination, I can literally feel the bone, but looks fairly clean  Patient is trying to continue offloading the heel ulcer, and also continuing the hyperbaric oxygen therapy treatments  The last lower extremity artery Doppler study done in 2021 revealed almost triphasic right ankle Doppler tracings and biphasic left ankle Doppler tracings with adequate flow to the heel and foot for potential tissue healing  Patient was rescheduled for repeat proximal artery Doppler study by his podiatrist, not done yet I do not see it in the epic  Inform the patient that last week I discussed with Dr. Luz Marina Chaney regarding continuing the hyperbaric oxygen therapy, possibility of skin grafting  Discussed with patient and nursing staff, please schedule appointment for the patient to see Dr. Luz Marina Chaney for his input pending return to have his podiatrist back to the wound care center for further management   we will make additional recommendations once the lower extremity artery Doppler studies completed  All his questions were answered  5/18/22  Concern that arterial flow is not adequate based off exam today  Left dp weakly biphasic, PT monophasic  Will hold hbo for now - reevaluate post angio  Discussed angiogram - he had previous with Dr Alley Bower which addressed his left sfa, pop with plasty using 6x250 DCB impact  Concern that tibial disease is the primary issue vs. Previous intervention has shut down  5/25/22  Angio 5/31  Wound stable heel  5/31/22  L SFA, pop atherectomy, plasty with 5x250  L PT plasty 3x80, 2x120  6/22/22   Wound larger but bone covered with granulation tissue  L DP strongly biphasic, PT biphasic, no right groin hematoma  aquacell ag  Culture  Consideration for graft in future  Consideration for hbo in future  6/29/22  Anciboacter bumani light growth - tx with bactrim  Wound dose appear better  7/13/22  Wound smaller  Appearance better  Pt having transportation issues - will transition him back to [facility] at [facility] also have reassessed for hbo at [facility]  7/25/22  Wound smaller  Appearance better  Cultures taken. X-rays right foot repeated    8/8/22 - X-rays pending. Continue current regimen for now. FU 1 week. HBOT continue    8/15/22 - X-rays show signs of OM. MRI ordered. Continue current regimen for now. FU 1 week. HBOT continue    8/22/22- MRI pending. Continue current regimen for now. FU 1 week. HBOT continue    9/12/22 - MRI confirmed OM. Recommend OR debridement going forward. Patient will think about this and let us know next week. 10/17/22 - wound healed. Removed sutures. Posterior ankle wound noted - healthy - alginate daily    10/24/22 - Alginate daily for now. 10/31/22 - Alginate daily. FU 2 weeks. Wounds resolving.     Wound/Ulcer Pain Timing/Severity: none  Quality of pain: N/A  Severity:  0 / 10   Modifying Factors: None  Associated Signs/Symptoms: none     Ulcer Identification:  Ulcer Type: arterial and diabetic  Contributing Factors: diabetes     Diabetic/Pressure/Non Pressure Ulcers onl y:        PAST MEDICAL HISTORY      Diagnosis Date    Arthritis     Bilateral carpal tunnel syndrome 2016    Cerebral artery occlusion with cerebral infarction [facility])     Chronic back pain     Coronary artery disease involving native coronary artery of native heart without angina pectoris 9/16/2022    CVA (cerebral vascular accident) ([address] 11/2015, 2017    Diabetes mellitus ([address]     Type 2    Diabetic foot ulcer with osteomyelitis ([address] 12/21/2021    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 2 diabetes mellitus, with necrosis of bone ([address] 12/21/2021    Hemiparesis affecting left side as late effect of cerebrovascular accident ([address]     LEFT SIDE NON DOMINANT FOLLOWING STROKE    Hyperlipidemia     Hypertension     Peripheral vascular angioplasty status 12/21/2021    Ulcer of left heel, with necrosis of bone ([address] 12/27/2021    Ulcer of left heel, with necrosis of muscle ([address] 12/21/2021    Vitamin D deficiency      Past Surgical History:   Procedure Laterality Date    CARPAL TUNNEL RELEASE  10/01/2016    Dr. Verona Brar Left 2021    LEFT FOOT DEBRIDEMENT WITH BONE BIOPSY, WOUND VAC APPLICATION performed by Jenn Naranjo DPM at [address] Left 2022    LEFT FOOT DEBRIDEMENT INCISION AND DRAINAGE performed by Jenn Naranjo DPM at [address] Left 2022    LEFT FOOT PARTIAL CALCANECTOMY AND DEBRIDEMENT performed by Jenn Naranjo DPM at [address]    TRANSESOPHAGEAL ECHOCARDIOGRAM N/A 2021    TRANSESOPHAGEAL ECHOCARDIOGRAM WITH BUBBLE STUDY performed by Cindy Jessica MD at [facility] 13 2021    EGD BIOPSY performed by Ivy Gandhi DO at [place] [id number] reviewed. No pertinent family history. Social History     Tobacco Use    Smoking status: Former     Packs/day: 1.00     Years: 25.00     Pack years: 25.00     Types: Cigarettes     Quit date: 2015     Years since quittin.8    Smokeless tobacco: Never   Vaping Use    Vaping Use: Never used   Substance Use Topics    Alcohol use: Not Currently     Comment: Former every day drinker for most of adult life    Drug use: No     Allergies   Allergen Reactions    Pcn [Penicillins] Anaphylaxis     Current Outpatient Medications on File Prior to Encounter   Medication Sig Dispense Refill    cyclobenzaprine (FLEXERIL) 5 MG tablet take 1 tablet by mouth nightly if needed for muscle spasm 30 tablet 2    glimepiride (AMARYL) 2 MG tablet Take 1 tablet by mouth every morning (before breakfast) 30 tablet 2    metFORMIN (GLUCOPHAGE) 500 MG tablet Take 1 tablet by mouth in the morning and 1 tablet in the evening. Take with meals.  60 tablet 3    atorvastatin (LIPITOR) 40 MG tablet Take 1 tablet by mouth nightly 30 tablet 3    enoxaparin (LOVENOX) 40 MG/0.4ML Inject 0.4 mLs into the skin daily      miconazole (MICOTIN) 2 % powder Apply topically 2 times daily. 45 g 1    gabapentin (NEURONTIN) 100 MG capsule Take 3 capsules by mouth 2 times daily for 30 days. 60 capsule 2    diphenhydrAMINE (BENADRYL) 25 MG capsule Take 25 mg by mouth every 6 hours as needed for Itching      B Complex-C-E-Zn (STRESS B/ZINC) TABS Take 1 tablet by mouth daily      acetaminophen (TYLENOL) 325 MG tablet Take 650 mg by mouth every 6 hours as needed for Pain      ferrous sulfate (IRON 325) 325 (65 Fe) MG tablet Take 325 mg by mouth daily (with breakfast)      vitamin D (ERGOCALCIFEROL) 1.25 MG (90536 UT) CAPS capsule Take 1 capsule by mouth once a week 5 capsule 0    melatonin 3 MG TABS tablet Take 9 mg by mouth nightly as needed      aspirin 81 MG EC tablet Take 81 mg by mouth daily      clopidogrel (PLAVIX) 75 MG tablet Take 1 tablet by mouth daily 30 tablet 3     No current facility-administered medications on file prior to encounter.        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 2 diabetes mellitus, with necrosis of bone ([facility] - Primary (Chronic)    Relevant Orders    Initiate Outpatient Wound Care Protocol    Diabetic foot infection [facility])    Relevant Orders    Initiate Outpatient Wound Care Protocol       Pre Debridement Measurements:  Are located in the [place]  Documentation Flow Sheet  Post Debridement Measurements:  Wound/Ulcer Descriptions are Pre Debridement except measurements:     Wound 12/26/20 Arm Left (Active)   Number of days: 673       Wound 02/15/21 Heel Left (Active)   Number of days: 623       Wound 02/15/21 Heel Right (Active)   Number of days: 623       Wound 03/08/21 Heel Left #1 left heel aquired 12/1/20 (Active)   Wound Image   10/17/22 1310   Wound Etiology Diabetic 02/14/22 1344   Dressing Status New dressing applied 10/24/22 1356   Wound Cleansed Cleansed with saline 10/24/22 1356   Dressing/Treatment Alginate;ABD 10/24/22 1356   Offloading for Diabetic Foot Ulcers Post op shoe 10/24/22 1356   Wound Length (cm) 6.2 cm 10/31/22 1301   Wound Width (cm) 0.7 cm 10/31/22 1301   Wound Depth (cm) 0.3 cm 10/31/22 1301   Wound Surface Area (cm^2) 4.34 cm^2 10/31/22 1301   Change in Wound Size % (l*w) 64.1 10/31/22 1301   Wound Volume (cm^3) 1.302 cm^3 10/31/22 1301   Wound Healing % 85 10/31/22 1301   Post-Procedure Length (cm) 6.3 cm 10/31/22 1308   Post-Procedure Width (cm) 0.8 cm 10/31/22 1308   Post-Procedure Depth (cm) 0.4 cm 10/31/22 1308   Post-Procedure Surface Area (cm^2) 5.04 cm^2 10/31/22 1308   Post-Procedure Volume (cm^3) 2. 016 cm^3 10/31/22 1308   Undermining Starts ___ O'Clock 9 05/25/22 1329   Undermining Ends___ O'Clock 3 05/25/22 1329   Undermining Maxium Distance (cm) [email or web address] 05/25/22 1329   Wound Assessment Fibrin;Granulation tissue 10/31/22 1301   Drainage Amount Small 10/31/22 1301   Drainage Description Serosanguinous 10/31/22 1301   Odor None 10/31/22 1301   Adrianne-wound Assessment Dry/flaky 10/31/22 1301   Number of days: 602       Wound 03/08/21 Heel Right #2 rt heel aquired 12/1/20 (Active)   Wound Image   04/05/21 0929   Dressing Status New dressing applied 03/18/21 1352   Wound Cleansed Cleansed with saline 04/01/21 1536   Dressing/Treatment Alginate;Collagen;Silicone border 78/98/80 1536   Offloading for Diabetic Foot Ulcers Post op shoe 04/01/21 1536   Wound Length (cm) 0 cm 04/05/21 0929   Wound Width (cm) 0 cm 04/05/21 0929   Wound Depth (cm) 0 cm 04/05/21 0929   Wound Surface Area (cm^2) 0 cm^2 04/05/21 0929   Change in Wound Size % (l*w) 100 04/05/21 0929   Wound Volume (cm^3) 0 cm^3 04/05/21 0929   Wound Healing % 100 04/05/21 0929   Post-Procedure Length (cm) 0 cm 04/05/21 1011   Post-Procedure Width (cm) 0 cm 04/05/21 1011   Post-Procedure Depth (cm) 0 cm 04/05/21 1011   Post-Procedure Surface Area (cm^2) 0 cm^2 04/05/21 1011   Post-Procedure Volume (cm^3) 0 cm^3 04/05/21 1011   Wound Assessment Fibrin 04/01/21 1357   Drainage Amount None 04/01/21 1357   Drainage Description Yellow 03/22/21 0911   Odor None 04/01/21 1357   Adrianne-wound Assessment Maceration 04/01/21 1357   Number of days: 602     Incision 02/19/21 Groin Right (Active)   Number of days: 475       Procedure Note  Indications:  Based on my examination of this patient's wound(s)/ulcer(s) today, debridement is required to promote healing and evaluate the wound base. Performed by: Cathie Celis DPM    Consent obtained:  Yes    Time out taken:  Yes    Pain Control: Anesthetic  Anesthetic: 4% Lidocaine Liquid Topical     Debridement:Excisional Debridement    Using curette the wound(s)/ulcer(s) was/were sharply debrided down through and including the removal of epidermis, dermis and subcutaneous tissue. Devitalized Tissue Debrided:  fibrin, biofilm, slough and exudate to stimulate bleeding to promote healing, post debridement good bleeding base and wound edges noted    Wound/Ulcer #: 1    Percent of Wound/Ulcer Debrided: 100%    Total Surface Area Debrided:  5.04 sq cm     Estimated Blood Loss:  Minimal  Hemostasis Achieved:  by pressure    Procedural Pain:  4  / 10   Post Procedural Pain:  2 / 10     Response to treatment:  Well tolerated by patient. Plan:   Treatment Note please see attached Discharge Instructions    Written patient dismissal instructions given to patient and signed by patient or POA.          Discharge Instructions         Visit Discharge/Physician Orders     Assessment of pain at discharge: moderate     Anesthetic used: 4% liquid lidocaine solution     Discharge to:     Left via:ambulance     Accompanied by: self     ECF/HHA: [facility] Orders:  Cleanse left heel ulcer with normal saline solution, apply alginate, abd and secure with kerlix. Change daily     Treatment Orders:   Wear prevalon boots or heel ayo while in bed. May be partial weight bearing up to 50% on left with surgical shoe and walker. [facility] followup visit: _________1 week Dr Isabelle Noe ____________  (Please note your next appointment above and if you are unable to keep, kindly give a 24 hour notice. Thank you.)     Physician signature:__________________________        If you experience any of the following, please call the [facility] during business hours:     * Increase in Pain  * Temperature over 101  * Increase in drainage from your wound  * Drainage with a foul odor  * Bleeding  * Increase in swelling  * Need for compression bandage changes due to slippage, breakthrough drainage. If you need medical attention outside of the business hours of the [facility] please contact your PCP or go to the nearest emergency room.            Electronically signed by Tyler Herring DPM on 10/31/2022 at 1:33 PM

## 2022-11-08 NOTE — DISCHARGE INSTRUCTIONS
Visit Discharge/Physician Orders     Assessment of pain at discharge: moderate     Anesthetic used: 4% liquid lidocaine solution     Discharge to:     Left via:ambulance     Accompanied by: self     ECF/HHA: [employer] Orders:  Cleanse left heel ulcer with normal saline solution, apply alginate, abd and secure with kerlix. Change daily     Treatment Orders:   Wear prevalon boots or heel yao while in bed. May apply full pressure to left foot           [address] [place] [address] followup visit: _________1 week Dr Chad Seals ____________  (Please note your next appointment above and if you are unable to keep, kindly give a 24 hour notice. Thank you.)     Physician signature:__________________________        If you experience any of the following, please call the [facility] during business hours:     * Increase in Pain  * Temperature over 101  * Increase in drainage from your wound  * Drainage with a foul odor  * Bleeding  * Increase in swelling  * Need for compression bandage changes due to slippage, breakthrough drainage. If you need medical attention outside of the business hours of the [facility] please contact your PCP or go to the nearest emergency room.

## 2022-11-14 ENCOUNTER — HOSPITAL ENCOUNTER (OUTPATIENT)
Dept: WOUND CARE | Age: 62
Discharge: HOME | End: 2022-11-14
Payer: COMMERCIAL

## 2022-11-14 VITALS
HEART RATE: 80 BPM | BODY MASS INDEX: 26.88 KG/M2 | TEMPERATURE: 97.5 F | RESPIRATION RATE: 18 BRPM | SYSTOLIC BLOOD PRESSURE: 173 MMHG | DIASTOLIC BLOOD PRESSURE: 87 MMHG | HEIGHT: 71 IN | WEIGHT: 192 LBS

## 2022-11-14 DIAGNOSIS — L08.9 DIABETIC FOOT INFECTION (HCC): Primary | ICD-10-CM

## 2022-11-14 DIAGNOSIS — L97.424 DIABETIC ULCER OF LEFT HEEL ASSOCIATED WITH TYPE 2 DIABETES MELLITUS, WITH NECROSIS OF BONE (HCC): ICD-10-CM

## 2022-11-14 DIAGNOSIS — E11.628 DIABETIC FOOT INFECTION (HCC): Primary | ICD-10-CM

## 2022-11-14 DIAGNOSIS — E11.621 DIABETIC ULCER OF LEFT HEEL ASSOCIATED WITH TYPE 2 DIABETES MELLITUS, WITH NECROSIS OF BONE (HCC): ICD-10-CM

## 2022-11-14 PROCEDURE — 11042 DBRDMT SUBQ TIS 1ST 20SQCM/<: CPT

## 2022-11-14 RX ORDER — LIDOCAINE HYDROCHLORIDE 20 MG/ML
JELLY TOPICAL ONCE
Status: CANCELLED | OUTPATIENT
Start: 2022-11-14 | End: 2022-11-14

## 2022-11-14 RX ORDER — LIDOCAINE 40 MG/G
CREAM TOPICAL ONCE
Status: CANCELLED | OUTPATIENT
Start: 2022-11-14 | End: 2022-11-14

## 2022-11-14 RX ORDER — BETAMETHASONE DIPROPIONATE 0.05 %
OINTMENT (GRAM) TOPICAL ONCE
Status: CANCELLED | OUTPATIENT
Start: 2022-11-14 | End: 2022-11-14

## 2022-11-14 RX ORDER — LIDOCAINE HYDROCHLORIDE 40 MG/ML
SOLUTION TOPICAL ONCE
Status: CANCELLED | OUTPATIENT
Start: 2022-11-14 | End: 2022-11-14

## 2022-11-14 RX ORDER — BACITRACIN ZINC AND POLYMYXIN B SULFATE 500; 1000 [USP'U]/G; [USP'U]/G
OINTMENT TOPICAL ONCE
Status: CANCELLED | OUTPATIENT
Start: 2022-11-14 | End: 2022-11-14

## 2022-11-14 RX ORDER — CLOBETASOL PROPIONATE 0.5 MG/G
OINTMENT TOPICAL ONCE
Status: CANCELLED | OUTPATIENT
Start: 2022-11-14 | End: 2022-11-14

## 2022-11-14 RX ORDER — GINSENG 100 MG
CAPSULE ORAL ONCE
Status: CANCELLED | OUTPATIENT
Start: 2022-11-14 | End: 2022-11-14

## 2022-11-14 RX ORDER — LIDOCAINE HYDROCHLORIDE 40 MG/ML
SOLUTION TOPICAL ONCE
Status: COMPLETED | OUTPATIENT
Start: 2022-11-14 | End: 2022-11-14

## 2022-11-14 RX ORDER — LIDOCAINE 50 MG/G
OINTMENT TOPICAL ONCE
Status: CANCELLED | OUTPATIENT
Start: 2022-11-14 | End: 2022-11-14

## 2022-11-14 RX ORDER — BACITRACIN, NEOMYCIN, POLYMYXIN B 400; 3.5; 5 [USP'U]/G; MG/G; [USP'U]/G
OINTMENT TOPICAL ONCE
Status: CANCELLED | OUTPATIENT
Start: 2022-11-14 | End: 2022-11-14

## 2022-11-14 RX ORDER — GENTAMICIN SULFATE 1 MG/G
OINTMENT TOPICAL ONCE
Status: CANCELLED | OUTPATIENT
Start: 2022-11-14 | End: 2022-11-14

## 2022-11-14 RX ADMIN — LIDOCAINE HYDROCHLORIDE 5 ML: 40 SOLUTION TOPICAL at 13:09

## 2022-11-14 NOTE — PLAN OF CARE
Problem: Chronic Conditions and Co-morbidities  Goal: Patient's chronic conditions and co-morbidity symptoms are monitored and maintained or improved  Outcome: Progressing     Problem: Chronic Conditions and Co-morbidities  Goal: Patient's chronic conditions and co-morbidity symptoms are monitored and maintained or improved  Outcome: Progressing     Problem: Pain  Goal: Verbalizes/displays adequate comfort level or baseline comfort level  Outcome: Progressing

## 2022-11-14 NOTE — PROGRESS NOTES
Wound Healing Center Followup Visit Note    Referring Physician : Aury Bradford MD  [address] [facility] RECORD NUMBER:  [id number]  AGE: 60 y.o. GENDER: male  : 1960  EPISODE DATE:  2022    Subjective:     Chief Complaint   Patient presents with    Wound Check        HISTORY of PRESENT ILLNESS HPI   Eliud Nina is a 60 y.o. male who presents today in regards to follow up evaluation and treatment of wound/ulcer. That patient's past medical, family and social hx were reviewed and changes were made if present. History of Wound Context:  The patient has had a wound of bilateral heels which was first noted approximately over a month ago. This has been treated with surgical debridement. On their initial visit to the wound healing center, 21 ,  the patient has noted that the wound has not been improving. The patient has had similar previous wounds in the past.       Pt is not on abx at time of initial visit. 3/22/21 - Wound VAC MWF continue to left heel. Aquacel Ag to right heel. Debrided toenails 1-5 in thickness and length. FU 1 week. IV Abx per Dr. Natacha Muñoz. 21 - Wound VAC MWF to left heel. Cultures obtained. FU 1 week after visit with Dr. Natacha Muñoz. PO Abx.  21 - DC wound VAC to left heel. Alginate to wound. FU 1 week  21 - Alginate and gentamicin ointment QOD. Partial WB to heel at 50% to foot with surgical shoe and walker. 5/3/21 - Alginate and gentamicin ointment QOD. MRI left heel     5/10/21 - Alginate and gentamicin ointment QOD. MRI left heel pending. 21 - Alginate and gentamicin ointment QOD. MRI reviewed confirming OM. Discussed HBOT referral and partial calcanectomy. He opts for HBOT consultation. 21 - Alginate and add gentamicin ointment QD. HBOT referral.  Patient hold off on partial calcanectomy. FU 1 week     21 - Alginate and add gentamicin ointment QD.   HBOT referral.  Patient hold off on partial calcanectomy. FU 1 week     6/28/21 - Alginate and add gentamicin ointment QD. HBOT referral.  Patient hold off on partial calcanectomy. FU 1 week. Patient gave me consent (verbal) to speak with his brother Amarjit Nephew regarding his at home arrangement. HBOT is on hold until patient is at home. 7/12/21 -  Alginate and add gentamicin ointment QD. HBOT referral.  Patient considering partial calcanectomy with flap. FU 1 week. 7/19/21 - Alginate and add gentamicin ointment QD. HBOT referral.  Patient considering partial calcanectomy with flap. FU 1 week. 8/2/21 - Alginate and add gentamicin ointment QD. HBOT referral.  Patient considering partial calcanectomy with flap. FU 1 week. 8/16/21 - Alginate and add gentamicin ointment QD. HBOT referral.  Patient considering partial calcanectomy with flap. FU 1 week. 8/23/21 - Alginate and add gentamicin ointment QD. HBOT referral.  Patient considering partial calcanectomy with flap. FU 1 week. 8/30/21 - Alginate and add gentamicin ointment QD. HBOT referral.  Patient considering partial calcanectomy with flap. FU 1 week. 9/20/21 - Alginate and add gentamicin ointment QD. HBOT referral.  Patient considering partial calcanectomy with flap. FU 1 week. 10/25/21 Alginate and add gentamicin ointment QD. HBOT referral.  Patient considering partial calcanectomy with flap. FU 1 week. 11/1/21 Alginate and add gentamicin ointment QD. HBOT referral.  Patient considering partial calcanectomy with flap. FU 1 week. 11/8/21 - Alginate and add gentamicin ointment QD. HBOT referral.  Patient considering partial calcanectomy with flap. FU 1 week. 11/15/21 - Alginate and add gentamicin ointment QD. HBOT referral.  Patient considering partial calcanectomy with flap. FU 1 week. 11/22/21 -  Alginate and add gentamicin ointment QD. HBOT referral.  Patient considering partial calcanectomy with flap. FU 1 week. 11/29/21 0 Alginate and gentamicin ointment. Possible DC home. HBOT referral.  Patient considering partial calcanectomy with flap. FU 1 week. 12/6/21 - Alginate and gentamicin ointment. Possible DC home. HBOT referral.  Patient considering partial calcanectomy with flap. FU 1 week. 12/13/21 - Alginate and gentamicin ointment. DC home. HBOT referral.  [facility] referral for wound care and PT for GAIT training and safe DME use. Patient considering partial calcanectomy with flap. FU 1 week. 12/20/21 -  Alginate and gentamicin ointment. DC home. HBOT referral.     12/27/21 - HBOT consultation complete - to start HBOT after new year. 2-14-22 patient presents today for evaluation of a left heel wound. Patient transferred here to [facility] due to the fact that he is doing HBO, for convenience due to transportation. He was prior following with Dr. Salina Segura. Patient overall doing well, no complaints. Tolerating dressings as well as hyperbarics. Physical exam demonstrates vascular intact. Wound appreciated posterior aspect with areas of devitalized nonviable tissue with beefy tissue noted as well. There is no purulence, odor, erythema or increase in temperature. Currently no exposed bone. 2-24-22  Patient overall doing well, no complaints. Tolerating dressings as well as hyperbarics. Physical exam demonstrates vascular intact. Wound appreciated posterior aspect with areas of devitalized nonviable tissue with beefy tissue noted as well. There is no purulence, odor, erythema or increase in temperature. 3-3-22 presents in follow-up. Relates dark area on his left great toenail as well as \"swelling\"  Left lower leg that he noticed recently. Patient denies any known trauma. Patient currently takes aspirin as well as Plavix. Physical exam demonstrates vascular intact.   Wound appreciated posterior aspect with areas of devitalized nonviable tissue with beefy tissue noted as well. There is no purulence, odor, erythema or increase in temperature. Left great toenail proximal lateral small area, subungual hematoma, stable. 2 fluctuant areas adjacent left lower leg anterior lateral aspect. There is no erythema or increase in temperature. After oral consent under sterile technique and utilizing 1% lidocaine plain, 21-gauge needle as well as 11 blade stab incision expressing approximately 20 cc of hematoma formation, this was cultured. Monitor closely if he notices any recurrence recommend [facility] for further evaluation    3-28-22 last seen March 3rd, transportation issues. Physical exam, vascular intact. Wound appreciated posterior aspect left heel with areas of devitalized nonviable tissue, increasing/seen. There is no purulence, odor, erythema or increase in temperature. Change treatment to Santyl. Patient would benefit from continued HBO.    4-4-22 Physical exam, vascular intact. Wound appreciated posterior aspect left heel with areas of devitalized nonviable tissue w/ areas of beefy tissue. There is no purulence, odor, erythema or increase in temperature. Santyl. HBO. Improved at present. 4-11-22 Wound appreciated posterior aspect left heel with areas of devitalized nonviable tissue w/ areas of beefy tissue. There is no purulence, odor, erythema or increase in temperature. Santyl. HBO. 4-18-22 Wound appreciated posterior aspect left heel with areas of devitalized nonviable tissue w/ areas of beefy tissue. There is no purulence, odor, erythema or increase in temperature. Santyl. HBO. Discussed possible graft in near future, will get updated vasc studies.     5/9/2022  Patient did not come last 2 weeks and also did not go for hyperbaric, last week, because of transportation problems  Patient tells me at one time his podiatrist Jesse Jackson informed him that he may consider skin grafting of the wound  On today's examination, I can literally feel the bone, but looks fairly clean  Patient is trying to continue offloading the heel ulcer, and also continuing the hyperbaric oxygen therapy treatments  The last lower extremity artery Doppler study done in 2021 revealed almost triphasic right ankle Doppler tracings and biphasic left ankle Doppler tracings with adequate flow to the heel and foot for potential tissue healing  Patient was rescheduled for repeat proximal artery Doppler study by his podiatrist, not done yet I do not see it in the epic  Inform the patient that last week I discussed with Dr. Norma Joyner regarding continuing the hyperbaric oxygen therapy, possibility of skin grafting  Discussed with patient and nursing staff, please schedule appointment for the patient to see Dr. Norma Joyner for his input pending return to have his podiatrist back to the wound care center for further management   we will make additional recommendations once the lower extremity artery Doppler studies completed  All his questions were answered  5/18/22  Concern that arterial flow is not adequate based off exam today  Left dp weakly biphasic, PT monophasic  Will hold hbo for now - reevaluate post angio  Discussed angiogram - he had previous with Dr Adriana Yanez which addressed his left sfa, pop with plasty using 6x250 DCB impact  Concern that tibial disease is the primary issue vs. Previous intervention has shut down  5/25/22  Angio 5/31  Wound stable heel  5/31/22  L SFA, pop atherectomy, plasty with 5x250  L PT plasty 3x80, 2x120  6/22/22   Wound larger but bone covered with granulation tissue  L DP strongly biphasic, PT biphasic, no right groin hematoma  aquacell ag  Culture  Consideration for graft in future  Consideration for hbo in future  6/29/22  Anciboacter bumani light growth - tx with bactrim  Wound dose appear better  7/13/22  Wound smaller  Appearance better  Pt having transportation issues - will transition him back to [facility]  Will also have reassessed for hbo at [facility]  7/25/22  Wound smaller  Appearance better  Cultures taken. X-rays right foot repeated    8/8/22 - X-rays pending. Continue current regimen for now. FU 1 week. HBOT continue    8/15/22 - X-rays show signs of OM. MRI ordered. Continue current regimen for now. FU 1 week. HBOT continue    8/22/22- MRI pending. Continue current regimen for now. FU 1 week. HBOT continue    9/12/22 - MRI confirmed OM. Recommend OR debridement going forward. Patient will think about this and let us know next week. 10/17/22 - wound healed. Removed sutures. Posterior ankle wound noted - healthy - alginate daily    10/24/22 - Alginate daily for now. 10/31/22 - Alginate daily. FU 2 weeks. Wounds resolving. 11/14/22 - Alginate - continue. Wounds resolving.   FU 2 weeks    Wound/Ulcer Pain Timing/Severity: none  Quality of pain: N/A  Severity:  0 / 10   Modifying Factors: None  Associated Signs/Symptoms: none     Ulcer Identification:  Ulcer Type: arterial and diabetic  Contributing Factors: diabetes     Diabetic/Pressure/Non Pressure Ulcers onl y:        PAST MEDICAL HISTORY      Diagnosis Date    Arthritis     Bilateral carpal tunnel syndrome 2016    Cerebral artery occlusion with cerebral infarction [facility])     Chronic back pain     Coronary artery disease involving native coronary artery of native heart without angina pectoris 9/16/2022    CVA (cerebral vascular accident) ([address] 11/2015, 2017    Diabetes mellitus ([address]     Type 2    Diabetic foot ulcer with osteomyelitis ([address] 12/21/2021    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 2 diabetes mellitus, with necrosis of bone ([address] 12/21/2021    Hemiparesis affecting left side as late effect of cerebrovascular accident ([address]     LEFT SIDE NON DOMINANT FOLLOWING STROKE    Hyperlipidemia     Hypertension     Peripheral vascular angioplasty status 12/21/2021    Ulcer of left heel, with necrosis of bone ([address] 12/27/2021    Ulcer of left heel, with necrosis of muscle ([facility] 2021    Vitamin D deficiency      Past Surgical History:   Procedure Laterality Date    CARPAL TUNNEL RELEASE  10/01/2016    Dr. Doc Henry Left 2021    LEFT FOOT DEBRIDEMENT WITH BONE BIOPSY, WOUND VAC APPLICATION performed by Timothy Finn DPM at [address] Left 2022    LEFT FOOT DEBRIDEMENT INCISION AND DRAINAGE performed by Timothy Finn DPM at [address] Left 2022    LEFT FOOT PARTIAL CALCANECTOMY AND DEBRIDEMENT performed by Timothy Finn DPM at [address]    TRANSESOPHAGEAL ECHOCARDIOGRAM N/A 2021    TRANSESOPHAGEAL ECHOCARDIOGRAM WITH BUBBLE STUDY performed by Estefany Ruby MD at [facility] 2021    EGD BIOPSY performed by Rachel Coughlin DO at [facility] reviewed. No pertinent family history. Social History     Tobacco Use    Smoking status: Former     Packs/day: 1.00     Years: 25.00     Pack years: 25.00     Types: Cigarettes     Quit date: 2015     Years since quittin.8    Smokeless tobacco: Never   Vaping Use    Vaping Use: Never used   Substance Use Topics    Alcohol use: Not Currently     Comment: Former every day drinker for most of adult life    Drug use: No     Allergies   Allergen Reactions    Pcn [Penicillins] Anaphylaxis     Current Outpatient Medications on File Prior to Encounter   Medication Sig Dispense Refill    cyclobenzaprine (FLEXERIL) 5 MG tablet take 1 tablet by mouth nightly if needed for muscle spasm 30 tablet 2    glimepiride (AMARYL) 2 MG tablet Take 1 tablet by mouth every morning (before breakfast) 30 tablet 2    metFORMIN (GLUCOPHAGE) 500 MG tablet Take 1 tablet by mouth in the morning and 1 tablet in the evening. Take with meals.  60 tablet 3    atorvastatin (LIPITOR) 40 MG tablet Take 1 tablet by mouth nightly 30 tablet 3 enoxaparin (LOVENOX) 40 MG/0.4ML Inject 0.4 mLs into the skin daily      miconazole (MICOTIN) 2 % powder Apply topically 2 times daily. 45 g 1    gabapentin (NEURONTIN) 100 MG capsule Take 3 capsules by mouth 2 times daily for 30 days. 60 capsule 2    diphenhydrAMINE (BENADRYL) 25 MG capsule Take 25 mg by mouth every 6 hours as needed for Itching      B Complex-C-E-Zn (STRESS B/ZINC) TABS Take 1 tablet by mouth daily      acetaminophen (TYLENOL) 325 MG tablet Take 650 mg by mouth every 6 hours as needed for Pain      ferrous sulfate (IRON 325) 325 (65 Fe) MG tablet Take 325 mg by mouth daily (with breakfast)      vitamin D (ERGOCALCIFEROL) 1.25 MG (88532 UT) CAPS capsule Take 1 capsule by mouth once a week 5 capsule 0    melatonin 3 MG TABS tablet Take 9 mg by mouth nightly as needed      aspirin 81 MG EC tablet Take 81 mg by mouth daily      clopidogrel (PLAVIX) 75 MG tablet Take 1 tablet by mouth daily 30 tablet 3     No current facility-administered medications on file prior to encounter.        REVIEW OF SYSTEMS See HPI    Objective:    BP (!) 173/87   Pulse 80   Temp 97.5 °F (36.4 °C) (Temporal)   Resp 18   Ht 5' 11\" (1.803 m)   Wt 192 lb (87.1 kg)   BMI 26.78 kg/m²   Wt Readings from Last 3 Encounters:   11/14/22 192 lb (87.1 kg)   10/31/22 192 lb (87.1 kg)   10/17/22 192 lb (87.1 kg)     PHYSICAL EXAM  CONSTITUTIONAL:   Awake, alert, cooperative   EYES:  lids and lashes normal   ENT: external ears and nose without lesions   NECK:  supple, symmetrical, trachea midline   SKIN:  Open wound     Assessment:     Problem List Items Addressed This Visit       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 2 diabetes mellitus, with necrosis of bone (HCC) (Chronic)    Relevant Orders    Initiate Outpatient Wound Care Protocol    Diabetic foot infection ([facility] - Primary    Relevant Orders    Initiate Outpatient Wound Care Protocol       Pre Debridement Measurements:  Are located in the [facility] Flow Sheet  Post Debridement Measurements:  Wound/Ulcer Descriptions are Pre Debridement except measurements:     Wound 12/26/20 Arm Left (Active)   Number of days: 687       Wound 02/15/21 Heel Left (Active)   Number of days: 637       Wound 02/15/21 Heel Right (Active)   Number of days: 637       Wound 03/08/21 Heel Left #1 left heel aquired 12/1/20 (Active)   Wound Image   10/17/22 1310   Wound Etiology Diabetic 02/14/22 1344   Dressing Status New dressing applied 11/14/22 1341   Wound Cleansed Cleansed with saline 11/14/22 1341   Dressing/Treatment Alginate;ABD 11/14/22 1341   Offloading for Diabetic Foot Ulcers Post op shoe 11/14/22 1341   Wound Length (cm) 1 cm 11/14/22 1306   Wound Width (cm) 0.6 cm 11/14/22 1306   Wound Depth (cm) 0.1 cm 11/14/22 1306   Wound Surface Area (cm^2) 0.6 cm^2 11/14/22 1306   Change in Wound Size % (l*w) 95.04 11/14/22 1306   Wound Volume (cm^3) 0.06 cm^3 11/14/22 1306   Wound Healing % 99 11/14/22 1306   Post-Procedure Length (cm) 1.1 cm 11/14/22 1329   Post-Procedure Width (cm) 0.7 cm 11/14/22 1329   Post-Procedure Depth (cm) 0.2 cm 11/14/22 1329   Post-Procedure Surface Area (cm^2) 0.77 cm^2 11/14/22 1329   Post-Procedure Volume (cm^3) 0.154 cm^3 11/14/22 1329   Undermining Starts ___ O'Clock 9 05/25/22 1329   Undermining Ends___ O'Clock 3 05/25/22 1329   Undermining Maxium Distance (cm) [email or web address] 05/25/22 1329   Wound Assessment Fibrin;Granulation tissue 11/14/22 1306   Drainage Amount Scant 11/14/22 1306   Drainage Description Yellow 11/14/22 1306   Odor None 10/31/22 1301   Adrianne-wound Assessment Dry/flaky 11/14/22 1306   Number of days: 616       Wound 03/08/21 Heel Right #2 rt heel aquired 12/1/20 (Active)   Wound Image   04/05/21 0929   Dressing Status New dressing applied 03/18/21 1352   Wound Cleansed Cleansed with saline 04/01/21 1536   Dressing/Treatment Alginate;Collagen;Silicone border 65/51/75 1536   Offloading for Diabetic Foot Ulcers Post op shoe 04/01/21 1536 Wound Length (cm) 0 cm 04/05/21 0929   Wound Width (cm) 0 cm 04/05/21 0929   Wound Depth (cm) 0 cm 04/05/21 0929   Wound Surface Area (cm^2) 0 cm^2 04/05/21 0929   Change in Wound Size % (l*w) 100 04/05/21 0929   Wound Volume (cm^3) 0 cm^3 04/05/21 0929   Wound Healing % 100 04/05/21 0929   Post-Procedure Length (cm) 0 cm 04/05/21 1011   Post-Procedure Width (cm) 0 cm 04/05/21 1011   Post-Procedure Depth (cm) 0 cm 04/05/21 1011   Post-Procedure Surface Area (cm^2) 0 cm^2 04/05/21 1011   Post-Procedure Volume (cm^3) 0 cm^3 04/05/21 1011   Wound Assessment Fibrin 04/01/21 1357   Drainage Amount None 04/01/21 1357   Drainage Description Yellow 03/22/21 0911   Odor None 04/01/21 1357   Adrianne-wound Assessment Maceration 04/01/21 1357   Number of days: 616     Incision 02/19/21 Groin Right (Active)   Number of days: 633       Procedure Note  Indications:  Based on my examination of this patient's wound(s)/ulcer(s) today, debridement is required to promote healing and evaluate the wound base. Performed by: Lloyd Hernandez DPM    Consent obtained:  Yes    Time out taken:  Yes    Pain Control: Anesthetic  Anesthetic: 4% Lidocaine Liquid Topical     Debridement:Excisional Debridement    Using curette the wound(s)/ulcer(s) was/were sharply debrided down through and including the removal of epidermis, dermis and subcutaneous tissue. Devitalized Tissue Debrided:  fibrin, biofilm, slough and exudate to stimulate bleeding to promote healing, post debridement good bleeding base and wound edges noted    Wound/Ulcer #: 1    Percent of Wound/Ulcer Debrided: 100%    Total Surface Area Debrided: <20 sq cm     Estimated Blood Loss:  Minimal  Hemostasis Achieved:  by pressure    Procedural Pain:  4  / 10   Post Procedural Pain:  2 / 10     Response to treatment:  Well tolerated by patient.        Plan:   Treatment Note please see attached Discharge Instructions    Written patient dismissal instructions given to patient and signed by patient or POA. Discharge Instructions         Visit Discharge/Physician Orders     Assessment of pain at discharge: moderate     Anesthetic used: 4% liquid lidocaine solution     Discharge to:     Left via:ambulance     Accompanied by: self     ECF/HHA: [facility] Orders:  Cleanse left heel ulcer with normal saline solution, apply alginate, abd and secure with kerlix. Change daily     Treatment Orders:   Wear prevalon boots or heel ayo while in bed. May apply full pressure to left foot           [facility] followup visit: _________1 week Dr Pretty Wall ____________  (Please note your next appointment above and if you are unable to keep, kindly give a 24 hour notice. Thank you.)     Physician signature:__________________________        If you experience any of the following, please call the [facility] during business hours:     * Increase in Pain  * Temperature over 101  * Increase in drainage from your wound  * Drainage with a foul odor  * Bleeding  * Increase in swelling  * Need for compression bandage changes due to slippage, breakthrough drainage. If you need medical attention outside of the business hours of the [facility] please contact your PCP or go to the nearest emergency room.            Electronically signed by Natalie Lazaro DPM on 11/14/2022 at 1:59 PM

## 2022-11-16 ENCOUNTER — OUTSIDE SERVICES (OUTPATIENT)
Dept: PRIMARY CARE CLINIC | Age: 62
End: 2022-11-16
Payer: COMMERCIAL

## 2022-11-16 DIAGNOSIS — I10 PRIMARY HYPERTENSION: ICD-10-CM

## 2022-11-16 DIAGNOSIS — I63.9 CEREBROVASCULAR ACCIDENT (CVA), UNSPECIFIED MECHANISM (HCC): Primary | ICD-10-CM

## 2022-11-16 DIAGNOSIS — E11.9 TYPE 2 DIABETES MELLITUS WITHOUT COMPLICATION, WITHOUT LONG-TERM CURRENT USE OF INSULIN (HCC): Chronic | ICD-10-CM

## 2022-11-16 DIAGNOSIS — I73.9 PERIPHERAL ARTERIAL DISEASE (HCC): ICD-10-CM

## 2022-11-16 PROCEDURE — 99307 SBSQ NF CARE SF MDM 10: CPT | Performed by: INTERNAL MEDICINE

## 2022-11-28 ENCOUNTER — HOSPITAL ENCOUNTER (OUTPATIENT)
Dept: WOUND CARE | Age: 62
Discharge: HOME | End: 2022-11-28
Payer: COMMERCIAL

## 2022-11-28 VITALS
RESPIRATION RATE: 18 BRPM | TEMPERATURE: 97.6 F | SYSTOLIC BLOOD PRESSURE: 163 MMHG | HEART RATE: 68 BPM | DIASTOLIC BLOOD PRESSURE: 84 MMHG

## 2022-11-28 DIAGNOSIS — L08.9 DIABETIC FOOT INFECTION (HCC): Primary | ICD-10-CM

## 2022-11-28 DIAGNOSIS — E11.628 DIABETIC FOOT INFECTION (HCC): Primary | ICD-10-CM

## 2022-11-28 DIAGNOSIS — L97.424 DIABETIC ULCER OF LEFT HEEL ASSOCIATED WITH TYPE 2 DIABETES MELLITUS, WITH NECROSIS OF BONE (HCC): ICD-10-CM

## 2022-11-28 DIAGNOSIS — E11.621 DIABETIC ULCER OF LEFT HEEL ASSOCIATED WITH TYPE 2 DIABETES MELLITUS, WITH NECROSIS OF BONE (HCC): ICD-10-CM

## 2022-11-28 PROCEDURE — 99212 OFFICE O/P EST SF 10 MIN: CPT

## 2022-11-28 RX ORDER — BACITRACIN, NEOMYCIN, POLYMYXIN B 400; 3.5; 5 [USP'U]/G; MG/G; [USP'U]/G
OINTMENT TOPICAL ONCE
Status: CANCELLED | OUTPATIENT
Start: 2022-11-28 | End: 2022-11-28

## 2022-11-28 RX ORDER — LIDOCAINE 50 MG/G
OINTMENT TOPICAL ONCE
Status: CANCELLED | OUTPATIENT
Start: 2022-11-28 | End: 2022-11-28

## 2022-11-28 RX ORDER — GENTAMICIN SULFATE 1 MG/G
OINTMENT TOPICAL ONCE
Status: CANCELLED | OUTPATIENT
Start: 2022-11-28 | End: 2022-11-28

## 2022-11-28 RX ORDER — LIDOCAINE 40 MG/G
CREAM TOPICAL ONCE
Status: CANCELLED | OUTPATIENT
Start: 2022-11-28 | End: 2022-11-28

## 2022-11-28 RX ORDER — LIDOCAINE HYDROCHLORIDE 40 MG/ML
SOLUTION TOPICAL ONCE
Status: CANCELLED | OUTPATIENT
Start: 2022-11-28 | End: 2022-11-28

## 2022-11-28 RX ORDER — LIDOCAINE HYDROCHLORIDE 20 MG/ML
JELLY TOPICAL ONCE
Status: CANCELLED | OUTPATIENT
Start: 2022-11-28 | End: 2022-11-28

## 2022-11-28 RX ORDER — BACITRACIN ZINC AND POLYMYXIN B SULFATE 500; 1000 [USP'U]/G; [USP'U]/G
OINTMENT TOPICAL ONCE
Status: CANCELLED | OUTPATIENT
Start: 2022-11-28 | End: 2022-11-28

## 2022-11-28 RX ORDER — CLOBETASOL PROPIONATE 0.5 MG/G
OINTMENT TOPICAL ONCE
Status: CANCELLED | OUTPATIENT
Start: 2022-11-28 | End: 2022-11-28

## 2022-11-28 RX ORDER — GINSENG 100 MG
CAPSULE ORAL ONCE
Status: CANCELLED | OUTPATIENT
Start: 2022-11-28 | End: 2022-11-28

## 2022-11-28 RX ORDER — BETAMETHASONE DIPROPIONATE 0.05 %
OINTMENT (GRAM) TOPICAL ONCE
Status: CANCELLED | OUTPATIENT
Start: 2022-11-28 | End: 2022-11-28

## 2022-11-28 NOTE — PLAN OF CARE
Problem: Chronic Conditions and Co-morbidities  Goal: Patient's chronic conditions and co-morbidity symptoms are monitored and maintained or improved  11/28/2022 1350 by Mendez Arango RN  Outcome: Adequate for Discharge  11/28/2022 1326 by Mendez Arango RN  Outcome: Progressing     Problem: Pain  Goal: Verbalizes/displays adequate comfort level or baseline comfort level  Outcome: Adequate for Discharge     Problem: Wound:  Goal: Will show signs of wound healing; wound closure and no evidence of infection  Description: Will show signs of wound healing; wound closure and no evidence of infection  Outcome: Adequate for Discharge

## 2022-11-28 NOTE — DISCHARGE INSTRUCTIONS
Visit Discharge/Physician Orders     Assessment of pain at discharge: moderate     Anesthetic used: 4% liquid lidocaine solution     Discharge to:     Left via:ambulance     Accompanied by: self     ECF/HHA: [facility] [employer] [facility] Orders:  healed. Script faxed to  for brace. Call for appt. [address] [place] [address]  [phone]     Treatment Orders:        May apply full pressure to left foot           [facility] followup visit: _________call as needed. Follow up in Dr Chandan Shetty private office for routine care ____________  (Please note your next appointment above and if you are unable to keep, kindly give a 24 hour notice. Thank you.)     Physician signature:__________________________        If you experience any of the following, please call the [facility] during business hours:     * Increase in Pain  * Temperature over 101  * Increase in drainage from your wound  * Drainage with a foul odor  * Bleeding  * Increase in swelling  * Need for compression bandage changes due to slippage, breakthrough drainage. If you need medical attention outside of the business hours of the [facility] please contact your PCP or go to the nearest emergency room.

## 2022-11-28 NOTE — PROGRESS NOTES
Wound Healing Center Followup Visit Note    Referring Physician : Oswaldo Gee MD  [address] [facility] RECORD NUMBER:  [id number]  AGE: 60 y.o. GENDER: male  : 1960  EPISODE DATE:  2022    Subjective:     Chief Complaint   Patient presents with    Wound Check     Left foot        HISTORY of PRESENT ILLNESS HPI   Joe Carey is a 60 y.o. male who presents today in regards to follow up evaluation and treatment of wound/ulcer. That patient's past medical, family and social hx were reviewed and changes were made if present. History of Wound Context:  The patient has had a wound of bilateral heels which was first noted approximately over a month ago. This has been treated with surgical debridement. On their initial visit to the wound healing center, 21 ,  the patient has noted that the wound has not been improving. The patient has had similar previous wounds in the past.       Pt is not on abx at time of initial visit. 3/22/21 - Wound VAC MWF continue to left heel. Aquacel Ag to right heel. Debrided toenails 1-5 in thickness and length. FU 1 week. IV Abx per Dr. Jose Alejandro Michael. 21 - Wound VAC MWF to left heel. Cultures obtained. FU 1 week after visit with Dr. Jose Alejandro Michael. PO Abx.  21 - DC wound VAC to left heel. Alginate to wound. FU 1 week  21 - Alginate and gentamicin ointment QOD. Partial WB to heel at 50% to foot with surgical shoe and walker. 5/3/21 - Alginate and gentamicin ointment QOD. MRI left heel     5/10/21 - Alginate and gentamicin ointment QOD. MRI left heel pending. 21 - Alginate and gentamicin ointment QOD. MRI reviewed confirming OM. Discussed HBOT referral and partial calcanectomy. He opts for HBOT consultation. 21 - Alginate and add gentamicin ointment QD. HBOT referral.  Patient hold off on partial calcanectomy. FU 1 week     21 - Alginate and add gentamicin ointment QD.   HBOT referral.  Patient hold off on partial calcanectomy. FU 1 week     6/28/21 - Alginate and add gentamicin ointment QD. HBOT referral.  Patient hold off on partial calcanectomy. FU 1 week. Patient gave me consent (verbal) to speak with his brother Beatrice Abreu regarding his at home arrangement. HBOT is on hold until patient is at home. 7/12/21 -  Alginate and add gentamicin ointment QD. HBOT referral.  Patient considering partial calcanectomy with flap. FU 1 week. 7/19/21 - Alginate and add gentamicin ointment QD. HBOT referral.  Patient considering partial calcanectomy with flap. FU 1 week. 8/2/21 - Alginate and add gentamicin ointment QD. HBOT referral.  Patient considering partial calcanectomy with flap. FU 1 week. 8/16/21 - Alginate and add gentamicin ointment QD. HBOT referral.  Patient considering partial calcanectomy with flap. FU 1 week. 8/23/21 - Alginate and add gentamicin ointment QD. HBOT referral.  Patient considering partial calcanectomy with flap. FU 1 week. 8/30/21 - Alginate and add gentamicin ointment QD. HBOT referral.  Patient considering partial calcanectomy with flap. FU 1 week. 9/20/21 - Alginate and add gentamicin ointment QD. HBOT referral.  Patient considering partial calcanectomy with flap. FU 1 week. 10/25/21 Alginate and add gentamicin ointment QD. HBOT referral.  Patient considering partial calcanectomy with flap. FU 1 week. 11/1/21 Alginate and add gentamicin ointment QD. HBOT referral.  Patient considering partial calcanectomy with flap. FU 1 week. 11/8/21 - Alginate and add gentamicin ointment QD. HBOT referral.  Patient considering partial calcanectomy with flap. FU 1 week. 11/15/21 - Alginate and add gentamicin ointment QD. HBOT referral.  Patient considering partial calcanectomy with flap. FU 1 week. 11/22/21 -  Alginate and add gentamicin ointment QD. HBOT referral.  Patient considering partial calcanectomy with flap.   FU 1 beefy tissue noted as well. There is no purulence, odor, erythema or increase in temperature. Left great toenail proximal lateral small area, subungual hematoma, stable. 2 fluctuant areas adjacent left lower leg anterior lateral aspect. There is no erythema or increase in temperature. After oral consent under sterile technique and utilizing 1% lidocaine plain, 21-gauge needle as well as 11 blade stab incision expressing approximately 20 cc of hematoma formation, this was cultured. Monitor closely if he notices any recurrence recommend [facility] for further evaluation    3-28-22 last seen March 3rd, transportation issues. Physical exam, vascular intact. Wound appreciated posterior aspect left heel with areas of devitalized nonviable tissue, increasing/seen. There is no purulence, odor, erythema or increase in temperature. Change treatment to Santyl. Patient would benefit from continued HBO.    4-4-22 Physical exam, vascular intact. Wound appreciated posterior aspect left heel with areas of devitalized nonviable tissue w/ areas of beefy tissue. There is no purulence, odor, erythema or increase in temperature. Santyl. HBO. Improved at present. 4-11-22 Wound appreciated posterior aspect left heel with areas of devitalized nonviable tissue w/ areas of beefy tissue. There is no purulence, odor, erythema or increase in temperature. Santyl. HBO. 4-18-22 Wound appreciated posterior aspect left heel with areas of devitalized nonviable tissue w/ areas of beefy tissue. There is no purulence, odor, erythema or increase in temperature. Santyl. HBO. Discussed possible graft in near future, will get updated vasc studies.     5/9/2022  Patient did not come last 2 weeks and also did not go for hyperbaric, last week, because of transportation problems  Patient tells me at one time his podiatrist Nick Valadez informed him that he may consider skin grafting of the wound  On today's examination, I can literally feel the bone, but looks fairly clean  Patient is trying to continue offloading the heel ulcer, and also continuing the hyperbaric oxygen therapy treatments  The last lower extremity artery Doppler study done in 2021 revealed almost triphasic right ankle Doppler tracings and biphasic left ankle Doppler tracings with adequate flow to the heel and foot for potential tissue healing  Patient was rescheduled for repeat proximal artery Doppler study by his podiatrist, not done yet I do not see it in the epic  Inform the patient that last week I discussed with Dr. Sheri Preston regarding continuing the hyperbaric oxygen therapy, possibility of skin grafting  Discussed with patient and nursing staff, please schedule appointment for the patient to see Dr. Sheri Preston for his input pending return to have his podiatrist back to the wound care center for further management   we will make additional recommendations once the lower extremity artery Doppler studies completed  All his questions were answered  5/18/22  Concern that arterial flow is not adequate based off exam today  Left dp weakly biphasic, PT monophasic  Will hold hbo for now - reevaluate post angio  Discussed angiogram - he had previous with Dr Najma Sanon which addressed his left sfa, pop with plasty using 6x250 DCB impact  Concern that tibial disease is the primary issue vs. Previous intervention has shut down  5/25/22  Angio 5/31  Wound stable heel  5/31/22  L SFA, pop atherectomy, plasty with 5x250  L PT plasty 3x80, 2x120  6/22/22   Wound larger but bone covered with granulation tissue  L DP strongly biphasic, PT biphasic, no right groin hematoma  aquacell ag  Culture  Consideration for graft in future  Consideration for hbo in future  6/29/22  Anciboacter bumani light growth - tx with bactrim  Wound dose appear better  7/13/22  Wound smaller  Appearance better  Pt having transportation issues - will transition him back to [facility] also have reassessed for hbo at [facility]  7/25/22  Wound smaller  Appearance better  Cultures taken. X-rays right foot repeated    8/8/22 - X-rays pending. Continue current regimen for now. FU 1 week. HBOT continue    8/15/22 - X-rays show signs of OM. MRI ordered. Continue current regimen for now. FU 1 week. HBOT continue    8/22/22- MRI pending. Continue current regimen for now. FU 1 week. HBOT continue    9/12/22 - MRI confirmed OM. Recommend OR debridement going forward. Patient will think about this and let us know next week. 10/17/22 - wound healed. Removed sutures. Posterior ankle wound noted - healthy - alginate daily    10/24/22 - Alginate daily for now. 10/31/22 - Alginate daily. FU 2 weeks. Wounds resolving. 11/14/22 - Alginate - continue. Wounds resolving. FU 2 weeks    11/28/22 - DC wound care. Wound healed. Referral for AFO brace.     Wound/Ulcer Pain Timing/Severity: none  Quality of pain: N/A  Severity:  0 / 10   Modifying Factors: None  Associated Signs/Symptoms: none     Ulcer Identification:  Ulcer Type: arterial and diabetic  Contributing Factors: diabetes     Diabetic/Pressure/Non Pressure Ulcers onl y:        PAST MEDICAL HISTORY      Diagnosis Date    Arthritis     Bilateral carpal tunnel syndrome 2016    Cerebral artery occlusion with cerebral infarction [facility])     Chronic back pain     Coronary artery disease involving native coronary artery of native heart without angina pectoris 9/16/2022    CVA (cerebral vascular accident) ([address] 11/2015, 2017    Diabetes mellitus ([address]     Type 2    Diabetic foot ulcer with osteomyelitis ([address] 12/21/2021    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 2 diabetes mellitus, with necrosis of bone ([address] 12/21/2021    Hemiparesis affecting left side as late effect of cerebrovascular accident [facility])     LEFT SIDE NON DOMINANT FOLLOWING STROKE    Hyperlipidemia     Hypertension     Peripheral vascular angioplasty status 12/21/2021 Ulcer of left heel, with necrosis of bone ([facility] 2021    Ulcer of left heel, with necrosis of muscle ([facility] 2021    Vitamin D deficiency      Past Surgical History:   Procedure Laterality Date    CARPAL TUNNEL RELEASE  10/01/2016    Dr. Guzmán Nearing Left 2021    LEFT FOOT DEBRIDEMENT WITH BONE BIOPSY, WOUND VAC APPLICATION performed by Laurence Gong DPM at [address] Left 2022    LEFT FOOT DEBRIDEMENT INCISION AND DRAINAGE performed by Laurence Gong DPM at [address] Left 2022    LEFT FOOT PARTIAL CALCANECTOMY AND DEBRIDEMENT performed by Laurence Gong DPM at [address]    TRANSESOPHAGEAL ECHOCARDIOGRAM N/A 2021    TRANSESOPHAGEAL ECHOCARDIOGRAM WITH BUBBLE STUDY performed by Derick Zheng MD at [address] 2021    EGD BIOPSY performed by Cynthia Morocho DO at [facility] reviewed. No pertinent family history. Social History     Tobacco Use    Smoking status: Former     Packs/day: 1.00     Years: 25.00     Pack years: 25.00     Types: Cigarettes     Quit date: 2015     Years since quittin.9    Smokeless tobacco: Never   Vaping Use    Vaping Use: Never used   Substance Use Topics    Alcohol use: Not Currently     Comment: Former every day drinker for most of adult life    Drug use: No     Allergies   Allergen Reactions    Pcn [Penicillins] Anaphylaxis     Current Outpatient Medications on File Prior to Encounter   Medication Sig Dispense Refill    cyclobenzaprine (FLEXERIL) 5 MG tablet take 1 tablet by mouth nightly if needed for muscle spasm 30 tablet 2    glimepiride (AMARYL) 2 MG tablet Take 1 tablet by mouth every morning (before breakfast) 30 tablet 2    metFORMIN (GLUCOPHAGE) 500 MG tablet Take 1 tablet by mouth in the morning and 1 tablet in the evening. Take with meals.  60 tablet 3 atorvastatin (LIPITOR) 40 MG tablet Take 1 tablet by mouth nightly 30 tablet 3    enoxaparin (LOVENOX) 40 MG/0.4ML Inject 0.4 mLs into the skin daily      miconazole (MICOTIN) 2 % powder Apply topically 2 times daily. 45 g 1    gabapentin (NEURONTIN) 100 MG capsule Take 3 capsules by mouth 2 times daily for 30 days. 60 capsule 2    diphenhydrAMINE (BENADRYL) 25 MG capsule Take 25 mg by mouth every 6 hours as needed for Itching      B Complex-C-E-Zn (STRESS B/ZINC) TABS Take 1 tablet by mouth daily      acetaminophen (TYLENOL) 325 MG tablet Take 650 mg by mouth every 6 hours as needed for Pain      ferrous sulfate (IRON 325) 325 (65 Fe) MG tablet Take 325 mg by mouth daily (with breakfast)      vitamin D (ERGOCALCIFEROL) 1.25 MG (21130 UT) CAPS capsule Take 1 capsule by mouth once a week 5 capsule 0    melatonin 3 MG TABS tablet Take 9 mg by mouth nightly as needed      aspirin 81 MG EC tablet Take 81 mg by mouth daily      clopidogrel (PLAVIX) 75 MG tablet Take 1 tablet by mouth daily 30 tablet 3     No current facility-administered medications on file prior to encounter.        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, with necrosis of bone (HCC) (Chronic)    Relevant Orders    Initiate Outpatient Wound Care Protocol    Diabetic foot infection ([facility] - Primary    Relevant Orders    Initiate Outpatient Wound Care Protocol       Pre Debridement Measurements:  Are located in the [facility]  Documentation Flow Sheet  Post Debridement Measurements:  Wound/Ulcer Descriptions are Pre Debridement except measurements:     Wound 12/26/20 Arm Left (Active)   Number of days: 701       Wound 02/15/21 Heel Left (Active)   Number of days: 651       Wound 02/15/21 Heel Right (Active)   Number of days: 651       Wound 03/08/21 Heel Left #1 left heel aquired 12/1/20 (Active)   Wound Image   11/28/22 1316   Wound Etiology Diabetic 02/14/22 1344   Dressing Status New dressing applied 11/14/22 1341   Wound Cleansed Cleansed with saline 11/14/22 1341   Dressing/Treatment Alginate;ABD 11/14/22 1341   Offloading for Diabetic Foot Ulcers Post op shoe 11/14/22 1341   Wound Length (cm) 0 cm 11/28/22 1316   Wound Width (cm) 0 cm 11/28/22 1316   Wound Depth (cm) 0 cm 11/28/22 1316   Wound Surface Area (cm^2) 0 cm^2 11/28/22 1316   Change in Wound Size % (l*w) 100 11/28/22 1316   Wound Volume (cm^3) 0 cm^3 11/28/22 1316   Wound Healing % 100 11/28/22 1316   Post-Procedure Length (cm) 1.1 cm 11/14/22 1329   Post-Procedure Width (cm) 0.7 cm 11/14/22 1329   Post-Procedure Depth (cm) 0.2 cm 11/14/22 1329   Post-Procedure Surface Area (cm^2) 0.77 cm^2 11/14/22 1329   Post-Procedure Volume (cm^3) 0.154 cm^3 11/14/22 1329   Undermining Starts ___ O'Clock 9 05/25/22 1329   Undermining Ends___ O'Clock 3 05/25/22 1329   Undermining Maxium Distance (cm) [email or web address] 05/25/22 1329   Wound Assessment Dry 11/28/22 1316   Drainage Amount None 11/28/22 1316   Drainage Description Yellow 11/14/22 1306   Odor None 10/31/22 1301   Adrianne-wound Assessment Dry/flaky 11/28/22 1316   Number of days: 630       Wound 03/08/21 Heel Right #2 rt heel aquired 12/1/20 (Active)   Wound Image   04/05/21 0929   Dressing Status New dressing applied 03/18/21 1352   Wound Cleansed Cleansed with saline 04/01/21 1536   Dressing/Treatment Alginate;Collagen;Silicone border 40/10/05 1536   Offloading for Diabetic Foot Ulcers Post op shoe 04/01/21 1536   Wound Length (cm) 0 cm 04/05/21 0929   Wound Width (cm) 0 cm 04/05/21 0929   Wound Depth (cm) 0 cm 04/05/21 0929   Wound Surface Area (cm^2) 0 cm^2 04/05/21 0929   Change in Wound Size % (l*w) 100 04/05/21 0929   Wound Volume (cm^3) 0 cm^3 04/05/21 0929   Wound Healing % 100 04/05/21 0929   Post-Procedure Length (cm) 0 cm 04/05/21 1011   Post-Procedure Width (cm) 0 cm 04/05/21 1011   Post-Procedure Depth (cm) 0 cm 04/05/21 1011   Post-Procedure Surface Area (cm^2) 0 cm^2 04/05/21 1011   Post-Procedure Volume (cm^3) 0 cm^3 04/05/21 1011   Wound Assessment Fibrin 04/01/21 1357   Drainage Amount None 04/01/21 1357   Drainage Description Yellow 03/22/21 0911   Odor None 04/01/21 1357   Adrianne-wound Assessment Maceration 04/01/21 1357   Number of days: 630     Incision 02/19/21 Groin Right (Active)   Number of days: 647       Procedure Note  Indications:  Based on my examination of this patient's wound(s)/ulcer(s) today, debridement is NOT required to promote healing and evaluate the wound base. Performed by: Dallas Mistry DPM    Consent obtained:  Yes    Time out taken:  Yes    Pain Control:           Plan:   Treatment Note please see attached Discharge Instructions    Written patient dismissal instructions given to patient and signed by patient or POA. Discharge Instructions         Visit Discharge/Physician Orders     Assessment of pain at discharge: moderate     Anesthetic used: 4% liquid lidocaine solution     Discharge to:     Left via:ambulance     Accompanied by: self     ECF/HHA: [facility] Orders:  healed. Script faxed to [facility] brace. Call for appt. [address] [place] [address]  [phone]     Treatment Orders:        May apply full pressure to left foot           [facility] followup visit: _________call as needed. Follow up in Dr Juarez Romano private office for routine care ____________  (Please note your next appointment above and if you are unable to keep, kindly give a 24 hour notice. Thank you.)     Physician signature:__________________________        If you experience any of the following, please call the [address] [place] [address] Road during business hours:     * Increase in Pain  * Temperature over 101  * Increase in drainage from your wound  * Drainage with a foul odor  * Bleeding  * Increase in swelling  * Need for compression bandage changes due to slippage, breakthrough drainage. If you need medical attention outside of the business hours of the [address] [place] [address] Road please contact your PCP or go to the nearest emergency room.            Electronically signed by Diann Joseph DPM on 11/28/2022 at 1:49 PM

## 2022-12-15 ENCOUNTER — TELEPHONE (OUTPATIENT)
Dept: PRIMARY CARE CLINIC | Age: 62
End: 2022-12-15

## 2022-12-15 NOTE — TELEPHONE ENCOUNTER
Patient just go released from the nursing home. He is scheduled to see you on January 4th. He does get tramadol. Mr. Golden Mckenna is in a wheel chair and does not walk due to his medical condition. He is scheduled but sometimes the medical Sara Villalobos that brings him never shows up to get him. He has let the know of this appointment but if he does not show, it would be because the transport was a no show on him. I can not find the tramadol in his med list but he does get it. I am going to look through his chart and see the last time it was filled and the dose. It is noted in the 8/9/22 visit    8/9/2022    traMADol (ULTRAM) 50 MG tablet (Taking) Take 1 tablet by mouth in the morning and 1 tablet in the evening. Do all this for 30 days.            Pharmacy  [facility] [address] [place] [address]  [phone]

## 2022-12-28 ENCOUNTER — OFFICE VISIT (OUTPATIENT)
Dept: PRIMARY CARE CLINIC | Age: 62
End: 2022-12-28
Payer: COMMERCIAL

## 2022-12-28 VITALS
HEIGHT: 71 IN | BODY MASS INDEX: 26.88 KG/M2 | HEART RATE: 94 BPM | WEIGHT: 192 LBS | TEMPERATURE: 97.6 F | OXYGEN SATURATION: 97 % | RESPIRATION RATE: 16 BRPM | SYSTOLIC BLOOD PRESSURE: 170 MMHG | DIASTOLIC BLOOD PRESSURE: 94 MMHG

## 2022-12-28 DIAGNOSIS — Z79.891 LONG TERM (CURRENT) USE OF OPIATE ANALGESIC: ICD-10-CM

## 2022-12-28 DIAGNOSIS — M54.42 LEFT-SIDED LOW BACK PAIN WITH SCIATICA, SCIATICA LATERALITY UNSPECIFIED, UNSPECIFIED CHRONICITY: Primary | ICD-10-CM

## 2022-12-28 DIAGNOSIS — M54.42 LEFT-SIDED LOW BACK PAIN WITH SCIATICA, SCIATICA LATERALITY UNSPECIFIED, UNSPECIFIED CHRONICITY: ICD-10-CM

## 2022-12-28 LAB
ALCOHOL URINE: NORMAL
AMPHETAMINE SCREEN, URINE: NEGATIVE
BARBITURATE SCREEN, URINE: NORMAL
BENZODIAZEPINE SCREEN, URINE: NEGATIVE
BUPRENORPHINE URINE: NEGATIVE
COCAINE METABOLITE SCREEN URINE: NEGATIVE
FENTANYL SCREEN, URINE: NORMAL
GABAPENTIN SCREEN, URINE: NORMAL
MDMA URINE: NEGATIVE
METHADONE SCREEN, URINE: NEGATIVE
METHAMPHETAMINE, URINE: NEGATIVE
OPIATE SCREEN URINE: NEGATIVE
OXYCODONE SCREEN URINE: NEGATIVE
PHENCYCLIDINE SCREEN URINE: NORMAL
PROPOXYPHENE SCREEN, URINE: NEGATIVE
SYNTHETIC CANNABINOIDS(K2) SCREEN, URINE: NORMAL
THC SCREEN, URINE: NEGATIVE
TRAMADOL SCREEN URINE: NORMAL
TRICYCLIC ANTIDEPRESSANTS, UR: NORMAL

## 2022-12-28 PROCEDURE — G8427 DOCREV CUR MEDS BY ELIG CLIN: HCPCS | Performed by: FAMILY MEDICINE

## 2022-12-28 PROCEDURE — 80305 DRUG TEST PRSMV DIR OPT OBS: CPT | Performed by: FAMILY MEDICINE

## 2022-12-28 PROCEDURE — 1036F TOBACCO NON-USER: CPT | Performed by: FAMILY MEDICINE

## 2022-12-28 PROCEDURE — 3017F COLORECTAL CA SCREEN DOC REV: CPT | Performed by: FAMILY MEDICINE

## 2022-12-28 PROCEDURE — 3078F DIAST BP <80 MM HG: CPT | Performed by: FAMILY MEDICINE

## 2022-12-28 PROCEDURE — G8419 CALC BMI OUT NRM PARAM NOF/U: HCPCS | Performed by: FAMILY MEDICINE

## 2022-12-28 PROCEDURE — 99213 OFFICE O/P EST LOW 20 MIN: CPT | Performed by: FAMILY MEDICINE

## 2022-12-28 PROCEDURE — G8484 FLU IMMUNIZE NO ADMIN: HCPCS | Performed by: FAMILY MEDICINE

## 2022-12-28 PROCEDURE — 3074F SYST BP LT 130 MM HG: CPT | Performed by: FAMILY MEDICINE

## 2022-12-28 RX ORDER — TRAMADOL HYDROCHLORIDE 50 MG/1
50 TABLET ORAL 2 TIMES DAILY PRN
Qty: 60 TABLET | Refills: 0 | Status: SHIPPED | OUTPATIENT
Start: 2022-12-28 | End: 2023-01-27

## 2022-12-28 RX ORDER — TRAMADOL HYDROCHLORIDE 50 MG/1
50 TABLET ORAL 2 TIMES DAILY
COMMUNITY
End: 2022-12-28 | Stop reason: SDUPTHER

## 2022-12-28 ASSESSMENT — ENCOUNTER SYMPTOMS
FACIAL SWELLING: 0
DIARRHEA: 0
SINUS PAIN: 0
PHOTOPHOBIA: 0
SHORTNESS OF BREATH: 0
VOMITING: 0
SINUS PRESSURE: 0
COLOR CHANGE: 0
RHINORRHEA: 0
ABDOMINAL DISTENTION: 0
COUGH: 0
CHEST TIGHTNESS: 0
BLOOD IN STOOL: 0
APNEA: 0
CONSTIPATION: 0

## 2022-12-28 NOTE — Clinical Note
[address] [address] [address] [facility]  Phone: [phone]  Fax: [phone]    Sundar Da Silva MD        December 28, 2022     Patient: Sina Wise   YOB: 1960   Date of Visit: 12/28/2022       To Whom It May Concern: It is my medical opinion that Telma Fruit {Work release (duty restriction):68320}. If you have any questions or concerns, please don't hesitate to call.     Sincerely,        Sundar Da Silva MD

## 2022-12-28 NOTE — PROGRESS NOTES
Rut Zuñiga is a 60 y.o. male accompanied by himself     CC:   Chief Complaint   Patient presents with    Lower Back Pain     Chronic lumbar pain onset: more than 10 years. Takes Tramadol twice a day, needs refills. Pain:  Patient is here today with complaints of low back pain. This is a/an chronic problem. This has been going on for many year(s). Exacerbating factors include movement. Alleviating factors include sitting and rest.  Pain is dull in nature. 8/10. Pain is not radiating. This has not happen to patient before. Imaging to date includes none. Therapy to date includes none. He has not seen pain management. Previous medical provider has been giving him tramadol 50 mg twice a day, he has been on this for several years now. Patient's past medical, surgical, social and/or family history reviewed, updated in chart, and are non-contributory (unless otherwise stated). Medications and allergies also reviewed and updated in chart. BP (!) 170/94   Pulse 94   Temp 97.6 °F (36.4 °C) (Temporal)   Resp 16   Ht 5' 11\" (1.803 m)   Wt 192 lb (87.1 kg)   SpO2 97%   BMI 26.78 kg/m²     Review of Systems   Constitutional:  Negative for chills, fatigue and fever. HENT:  Negative for congestion, ear pain, facial swelling, rhinorrhea, sinus pressure and sinus pain. Eyes:  Negative for photophobia and visual disturbance. Respiratory:  Negative for apnea, cough, chest tightness and shortness of breath. Cardiovascular:  Negative for chest pain and palpitations. Gastrointestinal:  Negative for abdominal distention, blood in stool, constipation, diarrhea and vomiting. Endocrine: Negative for cold intolerance, polydipsia, polyphagia and polyuria. Genitourinary:  Negative for decreased urine volume, frequency and urgency. Musculoskeletal:  Negative for arthralgias and gait problem. Skin:  Negative for color change.    Allergic/Immunologic: Negative for environmental allergies and food allergies. Neurological:  Negative for dizziness, light-headedness and headaches. Hematological:  Negative for adenopathy. Psychiatric/Behavioral:  Negative for agitation and confusion. Physical Exam  Constitutional:       Appearance: Normal appearance. HENT:      Head: Normocephalic and atraumatic. Nose: Nose normal. No congestion or rhinorrhea. Eyes:      Extraocular Movements: Extraocular movements intact. Pupils: Pupils are equal, round, and reactive to light. Cardiovascular:      Rate and Rhythm: Normal rate and regular rhythm. Heart sounds: No murmur heard. No friction rub. No gallop. Pulmonary:      Effort: Pulmonary effort is normal.      Breath sounds: Normal breath sounds. Chest:      Chest wall: No tenderness. Abdominal:      General: Abdomen is flat. Bowel sounds are normal. There is no distension. Palpations: Abdomen is soft. Tenderness: There is no abdominal tenderness. Musculoskeletal:         General: Normal range of motion. Cervical back: Normal range of motion. Feet:      Comments: Currently in boot status post surgery left foot  Skin:     General: Skin is warm and dry. Neurological:      General: No focal deficit present. Mental Status: He is alert and oriented to person, place, and time. Psychiatric:         Mood and Affect: Mood normal.       Assessment:  Dale Pulido was seen today for lower back pain. Diagnoses and all orders for this visit:    Left-sided low back pain with sciatica, sciatica laterality unspecified, unspecified chronicity  -     traMADol (ULTRAM) 50 MG tablet; Take 1 tablet by mouth 2 times daily as needed for Pain for up to 30 days. Max Daily Amount: 100 mg  -     XR LUMBAR SPINE (MIN 4 VIEWS); Future  -     External Referral To Physical Therapy  -     POCT Rapid Drug Screen  -     PAIN MANAGEMENT PROFILE 1 W/ CONFIRMATION, URINE;  Future    Long term (current) use of opiate analgesic  - POCT Rapid Drug Screen  -     PAIN MANAGEMENT PROFILE 1 W/ CONFIRMATION, URINE; Future      Controlled Substance Monitoring:    Acute and Chronic Pain Monitoring:   RX Monitoring 12/28/2022   Acute Pain Prescriptions -   Periodic Controlled Substance Monitoring Random urine drug screen sent today.;Obtaining appropriate analgesic effect of treatment. Chronic Pain > 80 MEDD -       Patient had a long discussion today about his persistent use of tramadol. Patient has never had a lumbar spine x-ray. He has never been to physical therapy specifically for this. As such I have referred the patient to physical therapy, I have obtained 4 views of his lumbar spine. We did obtain a urine sample today, The POCT urine screen that does not check for tramadol is actually negative suggesting that the patient does not use anything else. Follow Up:  Return in about 4 weeks (around 1/25/2023). As above. Call or go to ED immediately if symptoms worsen or persist.  Return in about 4 weeks (around 1/25/2023). , or sooner if necessary. Counseled regarding above diagnosis, including possible risks and complications,  especially if left uncontrolled. Counseledregarding the possible side effects, risks, benefits and alternatives to treatment; patient and/or guardian verbalizes understanding, agrees, feels comfortable with and wishes to proceed with above treatment plan. patient to call with any new medication issues, and read all Rx info from pharmacy to assure aware of all possible risks and side effects of medication before taking. Patient and/or guardian verbalizes understanding and agrees with above counseling,assessment and plan. All questions answered.

## 2022-12-28 NOTE — LETTER
CONTROLLED SUBSTANCE MEDICATION AGREEMENT     Patient Name: Elan Chino  Patient YOB: 1960   I understand, that controlled substance medications may be used to help better manage my symptoms and to improve my ability to function at home, work and in social settings. However, I also understand that these medications do have risks, which have been discussed with me, including possible development of physical or psychological dependence. I understand that successful treatment requires mutual trust and honesty between me and my provider. I understand and agree that following this Medication Agreement is necessary in continuing my provider-patient relationship and the success of my treatment plan. Explanation from my Provider: Benefits and Goals of Controlled Substance Medications: There are two potential goals for your treatment: (1) decreased pain and suffering (2) improved daily life functions. There are many possible treatments for your chronic condition(s). Alternatives such as physical therapy, yoga, massage, home daily exercise, meditation, relaxation techniques, injections, chiropractic manipulations, surgery, cognitive therapy, hypnosis and many medications that are not habit-forming may be used. Use of controlled substance medications may be helpful, but they are unlikely to resolve all symptoms or restore all function. Explanation from my Provider: Risks of Controlled Substance Medications:  Opioid pain medications: These medications can lead to problems such as addiction/dependence, sedation, lightheadedness/dizziness, memory issues, falls, constipation, nausea, or vomiting. They may also impair the ability to drive or operate machinery. Additionally, these medications may lower testosterone levels, leading to loss of bone strength, stamina and sex drive.   They may cause problems with breathing, sleep apnea and reduced coughing, which is especially dangerous for patients with lung disease. Overdose or dangerous interactions with alcohol and other medications may occur, leading to death. Hyperalgesia may develop, which means patients receiving opioids for the treatment of pain may become more sensitive to certain painful stimuli, and in some cases, experience pain from ordinarily non-painful stimuli. Women between the ages of 14-53 who could become pregnant should carefully weigh the risks and benefits of opioids with their physicians, as these medications increase the risk of pregnancy complications, including miscarriage,  delivery and stillbirth. It is also possible for babies to be born addicted to opioids. Opioid dependence withdrawal symptoms may include; feelings of uneasiness, increased pain, irritability, belly pain, diarrhea, sweats and goose-flesh. Benzodiazepines and non-benzodiazepine sleep medications: These medications can lead to problems such as addiction/dependence, sedation, fatigue, lightheadedness, dizziness, incoordination, falls, depression, hallucinations, and impaired judgment, memory and concentration. The ability to drive and operate machinery may also be affected. Abnormal sleep-related behaviors have been reported, including sleepwalking, driving, making telephone calls, eating, or having sex while not fully awake. These medications can suppress breathing and worsen sleep apnea, particularly when combined with alcohol or other sedating medications, potentially leading to death. Dependence withdrawal symptoms may include tremors, anxiety, hallucinations and seizures. Stimulants:  Common adverse effects include addiction/dependence, increased blood  pressure and heart rate, decreased appetite, nausea, involuntary weight loss, insomnia,                                                                                                                     Initials:_______   irritability, and headaches.   These risks may increase when these medications are combined with other stimulants, such as caffeine pills or energy drinks, certain weight loss supplements and oral decongestants. Dependence withdrawal symptoms may include depressed mood, loss of interest, suicidal thoughts, anxiety, fatigue, appetite changes and agitation. Testosterone replacement therapy:  Potential side effects include increased risk of stroke and heart attack, blood clots, increased blood pressure, increased cholesterol, enlarged prostate, sleep apnea, irritability/aggression and other mood disorders, and decreased fertility. I agree and understand that I and my prescriber have the following rights and responsibilities regarding my treatment plan:     1. MY RIGHTS:  To be informed of my treatment and medication plan. To be an active participant in my health and wellbeing. 2. MY RESPONSIBILITY AND UNDERSTANDING FOR USE OF MEDICATIONS   I will take medications at the dose and frequency as directed. For my safety, I will not increase or change how I take my medications without the recommendation of my healthcare provider.  I will actively participate in any program recommended by my provider which may improve function, including social, physical, psychological programs.  I will not take my medications with alcohol or other drugs not prescribed to me. I understand that drinking alcohol with my medications increases the chances of side effects, including reduced breathing rate and could lead to personal injury when operating machinery.  I understand that if I have a history of substance use disorders, including alcohol or other illicit drugs, that I may be at increased risk of addiction to my medications.  I agree to notify my provider immediately if I should become pregnant so that my treatment plan can be adjusted.    I agree and understand that I shall only receive controlled substance medications from the prescriber that signed this agreement unless there is written agreement among other prescribers of controlled substances outlining the responsibility of the medications being prescribed.  I understand that the if the controlled medication is not helping to achieve goals, the dosage may be tapered and no longer prescribed. 3. MY RESPONSIBILITY FOR COMMUNICATION / PRESCRIPTION RENEWALS   I agree that all controlled substance medications that I take will be prescribed only by my provider. If another healthcare provider prescribes me medication in an emergency, I will notify my provider within seventy-two (72) hours.  I will arrange for refills at the prescribed interval ONLY during regular office hours. I will not ask for refills earlier than agreed, after-hours, on holidays or weekends. Refills may take up to 72 hours for processing and prescriptions to reach the pharmacy.  I will inform my other health care providers that I am taking these medications and of the existence of this [id number]. In the event of an emergency, I will provide the same information to the emergency department prescribers.  I will keep my provider updated on the pharmacy I am using for controlled medication prescription filling. Initials:_______  4. MY RESPONSIBILITY FOR PROTECTING MEDICATIONS   I will protect my prescriptions and medications. I understand that lost or misplaced prescriptions will not be replaced.  I will keep medications only for my own use and will not share them with others. I will keep all medications away from children.  I agree that if my medications are adjusted or discontinued, I will properly dispose of any remaining medications. I understand that I will be required to dispose of any remaining controlled medications as, directed by my prescriber, prior to being provided with any prescriptions for other controlled medications.   Medication drop box locations can be found at: [email or web address]    5. MY RESPONSIBILITY WITH ILLEGAL DRUGS    I will not use illegal or street drugs or another person's prescription medications not prescribed to me.  If there are identified addiction type symptoms, then referral to a program may be provided by my provider and I agree to follow through with this recommendation. 6. MY RESPONSIBILITY FOR COOPERATION WITH INVESTIGATIONS   I understand that my provider will comply with any applicable law and may discuss my use and/or possible misuse/abuse of controlled substances and alcohol, as appropriate, with any health care provider involved in my care, pharmacist, or legal authority.  I authorize my provider and pharmacy to cooperate fully with law enforcement agencies (as permitted by law) in the investigation of any possible misuse, sale, or other diversion of my controlled substances.  I agree to waive any applicable privilege or right of privacy or confidentiality with respect to these authorizations. 7. PROVIDERS RIGHT TO MONITOR FOR SAFETY: PRESCRIPTION MONITORING / DRUG TESTING   I consent to drug/toxicology screening and will submit to a drug screen upon my providers request to assure I am only taking the prescribed drugs for my safety monitoring. I understand that a drug screen is a laboratory test in which a sample of my urine, blood or saliva is checked to see what drugs I have been taking. This may entail an observed urine specimen, which means that a nurse or other health care provider may watch me provide urine, and I will cooperate if I am asked to provide an observed specimen.  I understand that my provider will check a copy of my State Prescription Monitoring Program () Report in order to safely prescribe medications.  Pill Counts: I consent to pill counts when requested.   I may be asked to bring all my prescribed contact my HIPAA contact if there are concerns about my safety and use of the controlled medications. I have agreed to use the prescribed controlled substance medications to me as instructed by my provider and as stated in this Medication Agreement. My initial on each page and my signature below shows that I have read each page and I have had the opportunity to ask questions with answers provided by my provider.     Patient Name (Printed): _____________________________________  Patient Signature:  ______________________   Date: _____________    Prescriber Name (Printed): ___________________________________  Prescriber Signature: _____________________  Date: _____________

## 2022-12-29 LAB
6-MONOACETYLMORPHINE, URINE: NOT DETECTED
ALCOHOL URINE: NOT DETECTED
AMPHETAMINE SCREEN, URINE: NOT DETECTED
BARBITURATE SCREEN URINE: NOT DETECTED
BENZODIAZEPINE SCREEN, URINE: NOT DETECTED
BUPRENORPHINE URINE: NOT DETECTED
CANNABINOID SCREEN URINE: NOT DETECTED
COCAINE METABOLITE SCREEN URINE: NOT DETECTED
FENTANYL SCREEN, URINE: NOT DETECTED
INTEGRITY CHECK, CREATININE, URINE: 37.8
INTEGRITY CHECK, OXIDANT, URINE: <40
INTEGRITY CHECK, PH, URINE: 8.6 (ref 4.5–9)
INTEGRITY CHECK, SPECIFIC GRAVITY, URINE: 1.01 (ref 1–1.03)
INTEGRITY CHECK, SPECIMEN INTEGRITY, URINE: NORMAL
Lab: NORMAL
METHADONE SCREEN, URINE: NOT DETECTED
OPIATE SCREEN URINE: NOT DETECTED
OXYCODONE URINE: NOT DETECTED
PHENCYCLIDINE SCREEN URINE: NOT DETECTED
TRAMADOL SCREEN URINE: NOT DETECTED

## 2023-01-09 LAB
AVERAGE GLUCOSE: NORMAL
HBA1C MFR BLD: 6.4 %

## 2023-01-24 ENCOUNTER — COMMUNITY OUTREACH (OUTPATIENT)
Dept: PRIMARY CARE CLINIC | Age: 63
End: 2023-01-24

## 2023-02-06 DIAGNOSIS — M54.50 CHRONIC LOW BACK PAIN WITHOUT SCIATICA, UNSPECIFIED BACK PAIN LATERALITY: ICD-10-CM

## 2023-02-06 DIAGNOSIS — G89.29 CHRONIC LOW BACK PAIN WITHOUT SCIATICA, UNSPECIFIED BACK PAIN LATERALITY: ICD-10-CM

## 2023-04-21 DIAGNOSIS — E08.00 DIABETES MELLITUS DUE TO UNDERLYING CONDITION WITH HYPEROSMOLARITY WITHOUT COMA, UNSPECIFIED WHETHER LONG TERM INSULIN USE (HCC): ICD-10-CM

## 2023-04-21 RX ORDER — CLOPIDOGREL BISULFATE 75 MG/1
75 TABLET ORAL DAILY
Qty: 30 TABLET | Refills: 3 | Status: SHIPPED | OUTPATIENT
Start: 2023-04-21

## 2023-04-21 RX ORDER — GLIMEPIRIDE 2 MG/1
2 TABLET ORAL
Qty: 30 TABLET | Refills: 2 | Status: SHIPPED | OUTPATIENT
Start: 2023-04-21

## 2023-04-21 RX ORDER — ATORVASTATIN CALCIUM 40 MG/1
40 TABLET, FILM COATED ORAL NIGHTLY
Qty: 30 TABLET | Refills: 3 | Status: SHIPPED | OUTPATIENT
Start: 2023-04-21

## 2023-04-25 DIAGNOSIS — M54.50 CHRONIC LOW BACK PAIN WITHOUT SCIATICA, UNSPECIFIED BACK PAIN LATERALITY: ICD-10-CM

## 2023-04-25 DIAGNOSIS — E08.00 DIABETES MELLITUS DUE TO UNDERLYING CONDITION WITH HYPEROSMOLARITY WITHOUT COMA, UNSPECIFIED WHETHER LONG TERM INSULIN USE (HCC): ICD-10-CM

## 2023-04-25 DIAGNOSIS — G89.29 CHRONIC LOW BACK PAIN WITHOUT SCIATICA, UNSPECIFIED BACK PAIN LATERALITY: ICD-10-CM

## 2023-04-25 RX ORDER — CLOPIDOGREL BISULFATE 75 MG/1
75 TABLET ORAL DAILY
Qty: 30 TABLET | Refills: 3 | Status: SHIPPED | OUTPATIENT
Start: 2023-04-25

## 2023-04-25 RX ORDER — CYCLOBENZAPRINE HCL 5 MG
TABLET ORAL
Qty: 30 TABLET | Refills: 2 | Status: SHIPPED | OUTPATIENT
Start: 2023-04-25

## 2023-04-25 RX ORDER — GABAPENTIN 100 MG/1
300 CAPSULE ORAL 2 TIMES DAILY
Qty: 60 CAPSULE | Refills: 2 | Status: SHIPPED | OUTPATIENT
Start: 2023-04-25 | End: 2023-05-25

## 2023-04-25 RX ORDER — ATORVASTATIN CALCIUM 40 MG/1
40 TABLET, FILM COATED ORAL NIGHTLY
Qty: 30 TABLET | Refills: 3 | Status: SHIPPED | OUTPATIENT
Start: 2023-04-25

## 2023-04-25 RX ORDER — GLIMEPIRIDE 2 MG/1
2 TABLET ORAL
Qty: 30 TABLET | Refills: 2 | Status: SHIPPED | OUTPATIENT
Start: 2023-04-25

## 2023-05-12 PROBLEM — A40.0: Status: RESOLVED | Noted: 2022-09-16 | Resolved: 2023-05-12

## 2023-05-12 PROBLEM — E87.1 HYPONATREMIA: Status: RESOLVED | Noted: 2020-12-26 | Resolved: 2023-05-12

## 2023-05-12 PROBLEM — K26.4 DUODENAL ULCER WITH HEMORRHAGE: Status: ACTIVE | Noted: 2023-05-12

## 2023-05-12 PROBLEM — Z66 DO NOT RESUSCITATE: Status: ACTIVE | Noted: 2023-02-02

## 2023-05-12 PROBLEM — L08.9 DIABETIC FOOT INFECTION (HCC): Status: RESOLVED | Noted: 2021-02-14 | Resolved: 2023-05-12

## 2023-05-12 PROBLEM — E87.20 LACTIC ACIDOSIS: Status: RESOLVED | Noted: 2022-09-20 | Resolved: 2023-05-12

## 2023-05-12 PROBLEM — R78.81 BACTEREMIA DUE TO GRAM-POSITIVE BACTERIA: Status: RESOLVED | Noted: 2022-09-20 | Resolved: 2023-05-12

## 2023-05-12 PROBLEM — L03.90 SEPSIS DUE TO CELLULITIS (HCC): Status: RESOLVED | Noted: 2022-09-20 | Resolved: 2023-05-12

## 2023-05-12 PROBLEM — A41.9 SEPSIS DUE TO CELLULITIS (HCC): Status: RESOLVED | Noted: 2022-09-20 | Resolved: 2023-05-12

## 2023-05-12 PROBLEM — I73.9 PVD (PERIPHERAL VASCULAR DISEASE) (HCC): Status: RESOLVED | Noted: 2022-05-31 | Resolved: 2023-05-12

## 2023-05-12 PROBLEM — E11.628 DIABETIC FOOT INFECTION (HCC): Status: RESOLVED | Noted: 2021-02-14 | Resolved: 2023-05-12

## 2023-05-12 PROBLEM — E11.65 UNCONTROLLED TYPE 2 DIABETES MELLITUS WITH HYPERGLYCEMIA (HCC): Status: RESOLVED | Noted: 2022-09-20 | Resolved: 2023-05-12

## 2023-05-12 PROBLEM — R03.0 FINDING OF ABOVE NORMAL BLOOD PRESSURE: Status: ACTIVE | Noted: 2023-02-02

## 2023-05-12 PROBLEM — L97.424 ULCER OF LEFT HEEL, WITH NECROSIS OF BONE (HCC): Status: RESOLVED | Noted: 2021-12-27 | Resolved: 2023-05-12

## 2023-05-12 PROBLEM — L97.423 ULCER OF LEFT HEEL, WITH NECROSIS OF MUSCLE (HCC): Status: RESOLVED | Noted: 2021-12-21 | Resolved: 2023-05-12

## 2023-05-12 RX ORDER — ATORVASTATIN CALCIUM 80 MG/1
80 TABLET, FILM COATED ORAL DAILY
COMMUNITY
Start: 2023-03-24

## 2023-05-12 RX ORDER — PANTOPRAZOLE SODIUM 40 MG/1
40 TABLET, DELAYED RELEASE ORAL DAILY
COMMUNITY
Start: 2023-02-02 | End: 2023-05-16 | Stop reason: SDUPTHER

## 2023-05-16 ENCOUNTER — OFFICE VISIT (OUTPATIENT)
Dept: PRIMARY CARE CLINIC | Age: 63
End: 2023-05-16
Payer: COMMERCIAL

## 2023-05-16 VITALS
DIASTOLIC BLOOD PRESSURE: 70 MMHG | BODY MASS INDEX: 25.48 KG/M2 | HEIGHT: 71 IN | SYSTOLIC BLOOD PRESSURE: 162 MMHG | RESPIRATION RATE: 18 BRPM | OXYGEN SATURATION: 97 % | TEMPERATURE: 97.5 F | HEART RATE: 88 BPM | WEIGHT: 182 LBS

## 2023-05-16 DIAGNOSIS — G62.9 NEUROPATHY: ICD-10-CM

## 2023-05-16 DIAGNOSIS — K21.9 GASTROESOPHAGEAL REFLUX DISEASE WITHOUT ESOPHAGITIS: ICD-10-CM

## 2023-05-16 DIAGNOSIS — G89.29 CHRONIC LOW BACK PAIN WITHOUT SCIATICA, UNSPECIFIED BACK PAIN LATERALITY: ICD-10-CM

## 2023-05-16 DIAGNOSIS — E11.9 TYPE 2 DIABETES MELLITUS WITHOUT COMPLICATION, WITHOUT LONG-TERM CURRENT USE OF INSULIN (HCC): Primary | ICD-10-CM

## 2023-05-16 DIAGNOSIS — I10 PRIMARY HYPERTENSION: ICD-10-CM

## 2023-05-16 DIAGNOSIS — M54.50 CHRONIC LOW BACK PAIN WITHOUT SCIATICA, UNSPECIFIED BACK PAIN LATERALITY: ICD-10-CM

## 2023-05-16 DIAGNOSIS — L97.526 ULCER OF LEFT FOOT WITH BONE INVOLVEMENT WITHOUT EVIDENCE OF NECROSIS (HCC): ICD-10-CM

## 2023-05-16 LAB — HBA1C MFR BLD: 6.8 %

## 2023-05-16 PROCEDURE — 3017F COLORECTAL CA SCREEN DOC REV: CPT | Performed by: STUDENT IN AN ORGANIZED HEALTH CARE EDUCATION/TRAINING PROGRAM

## 2023-05-16 PROCEDURE — 3077F SYST BP >= 140 MM HG: CPT | Performed by: STUDENT IN AN ORGANIZED HEALTH CARE EDUCATION/TRAINING PROGRAM

## 2023-05-16 PROCEDURE — 2022F DILAT RTA XM EVC RTNOPTHY: CPT | Performed by: STUDENT IN AN ORGANIZED HEALTH CARE EDUCATION/TRAINING PROGRAM

## 2023-05-16 PROCEDURE — 83036 HEMOGLOBIN GLYCOSYLATED A1C: CPT | Performed by: STUDENT IN AN ORGANIZED HEALTH CARE EDUCATION/TRAINING PROGRAM

## 2023-05-16 PROCEDURE — 3044F HG A1C LEVEL LT 7.0%: CPT | Performed by: STUDENT IN AN ORGANIZED HEALTH CARE EDUCATION/TRAINING PROGRAM

## 2023-05-16 PROCEDURE — G8419 CALC BMI OUT NRM PARAM NOF/U: HCPCS | Performed by: STUDENT IN AN ORGANIZED HEALTH CARE EDUCATION/TRAINING PROGRAM

## 2023-05-16 PROCEDURE — 3078F DIAST BP <80 MM HG: CPT | Performed by: STUDENT IN AN ORGANIZED HEALTH CARE EDUCATION/TRAINING PROGRAM

## 2023-05-16 PROCEDURE — G8427 DOCREV CUR MEDS BY ELIG CLIN: HCPCS | Performed by: STUDENT IN AN ORGANIZED HEALTH CARE EDUCATION/TRAINING PROGRAM

## 2023-05-16 PROCEDURE — 99214 OFFICE O/P EST MOD 30 MIN: CPT | Performed by: STUDENT IN AN ORGANIZED HEALTH CARE EDUCATION/TRAINING PROGRAM

## 2023-05-16 PROCEDURE — 1036F TOBACCO NON-USER: CPT | Performed by: STUDENT IN AN ORGANIZED HEALTH CARE EDUCATION/TRAINING PROGRAM

## 2023-05-16 RX ORDER — ERGOCALCIFEROL 1.25 MG/1
50000 CAPSULE ORAL WEEKLY
Qty: 5 CAPSULE | Refills: 0 | Status: SHIPPED | OUTPATIENT
Start: 2023-05-16

## 2023-05-16 RX ORDER — LOSARTAN POTASSIUM 50 MG/1
50 TABLET ORAL DAILY
Qty: 90 TABLET | Refills: 1 | Status: SHIPPED | OUTPATIENT
Start: 2023-05-16

## 2023-05-16 RX ORDER — PANTOPRAZOLE SODIUM 40 MG/1
40 TABLET, DELAYED RELEASE ORAL DAILY
Qty: 90 TABLET | Refills: 0 | Status: SHIPPED | OUTPATIENT
Start: 2023-05-16 | End: 2023-08-14

## 2023-05-16 RX ORDER — GABAPENTIN 100 MG/1
300 CAPSULE ORAL 2 TIMES DAILY
Qty: 180 CAPSULE | Refills: 0 | Status: SHIPPED | OUTPATIENT
Start: 2023-05-16 | End: 2023-08-14

## 2023-05-16 RX ORDER — TRAMADOL HYDROCHLORIDE 50 MG/1
50 TABLET ORAL 2 TIMES DAILY PRN
Qty: 180 TABLET | Refills: 0 | Status: SHIPPED | OUTPATIENT
Start: 2023-05-16 | End: 2023-08-14

## 2023-05-16 RX ORDER — TRAMADOL HYDROCHLORIDE 50 MG/1
1 TABLET ORAL 2 TIMES DAILY PRN
COMMUNITY
Start: 2023-04-10 | End: 2023-05-16 | Stop reason: SDUPTHER

## 2023-05-16 SDOH — ECONOMIC STABILITY: HOUSING INSECURITY
IN THE LAST 12 MONTHS, WAS THERE A TIME WHEN YOU DID NOT HAVE A STEADY PLACE TO SLEEP OR SLEPT IN A SHELTER (INCLUDING NOW)?: NO

## 2023-05-16 SDOH — ECONOMIC STABILITY: FOOD INSECURITY: WITHIN THE PAST 12 MONTHS, THE FOOD YOU BOUGHT JUST DIDN'T LAST AND YOU DIDN'T HAVE MONEY TO GET MORE.: NEVER TRUE

## 2023-05-16 SDOH — ECONOMIC STABILITY: INCOME INSECURITY: HOW HARD IS IT FOR YOU TO PAY FOR THE VERY BASICS LIKE FOOD, HOUSING, MEDICAL CARE, AND HEATING?: NOT VERY HARD

## 2023-05-16 SDOH — ECONOMIC STABILITY: FOOD INSECURITY: WITHIN THE PAST 12 MONTHS, YOU WORRIED THAT YOUR FOOD WOULD RUN OUT BEFORE YOU GOT MONEY TO BUY MORE.: NEVER TRUE

## 2023-05-16 ASSESSMENT — PATIENT HEALTH QUESTIONNAIRE - PHQ9
SUM OF ALL RESPONSES TO PHQ QUESTIONS 1-9: 0
SUM OF ALL RESPONSES TO PHQ9 QUESTIONS 1 & 2: 0
2. FEELING DOWN, DEPRESSED OR HOPELESS: 0
1. LITTLE INTEREST OR PLEASURE IN DOING THINGS: 0

## 2023-05-16 ASSESSMENT — ENCOUNTER SYMPTOMS
EYES NEGATIVE: 1
GASTROINTESTINAL NEGATIVE: 1
BACK PAIN: 1
RESPIRATORY NEGATIVE: 1

## 2023-05-16 NOTE — PROGRESS NOTES
Pharynx: Oropharynx is clear. Eyes:      General:         Right eye: No discharge. Left eye: No discharge. Neck:      Vascular: No carotid bruit. Cardiovascular:      Rate and Rhythm: Normal rate and regular rhythm. Pulses: Normal pulses. Heart sounds: Normal heart sounds. Pulmonary:      Effort: Pulmonary effort is normal.      Breath sounds: Normal breath sounds. Abdominal:      General: Bowel sounds are normal. There is no distension. Palpations: Abdomen is soft. Tenderness: There is no abdominal tenderness. Musculoskeletal:      Cervical back: No tenderness. Comments: Left foot in walking shoe   Lymphadenopathy:      Cervical: No cervical adenopathy. Skin:     Comments: No spider angioma or palmar erythema   Neurological:      Mental Status: He is alert. Mental status is at baseline. Psychiatric:         Mood and Affect: Mood normal.         Behavior: Behavior normal.              An electronic signature was used to authenticate this note.     --Yonis Soto MD [facility]

## 2023-07-11 ENCOUNTER — TELEPHONE (OUTPATIENT)
Dept: PRIMARY CARE CLINIC | Age: 63
End: 2023-07-11

## 2023-07-11 NOTE — TELEPHONE ENCOUNTER
Kevin from [facility] would like an order for a 4 wheel walker with seat sent to fax [phone]:    DX R2.69, M62.95

## 2023-07-12 DIAGNOSIS — I63.9 CEREBROVASCULAR ACCIDENT (CVA), UNSPECIFIED MECHANISM (HCC): Primary | ICD-10-CM

## 2023-07-24 DIAGNOSIS — M54.50 CHRONIC LOW BACK PAIN WITHOUT SCIATICA, UNSPECIFIED BACK PAIN LATERALITY: ICD-10-CM

## 2023-07-24 DIAGNOSIS — G89.29 CHRONIC LOW BACK PAIN WITHOUT SCIATICA, UNSPECIFIED BACK PAIN LATERALITY: ICD-10-CM

## 2023-07-24 RX ORDER — CYCLOBENZAPRINE HCL 5 MG
TABLET ORAL
Qty: 30 TABLET | Refills: 2 | Status: SHIPPED | OUTPATIENT
Start: 2023-07-24

## 2023-07-24 RX ORDER — GABAPENTIN 100 MG/1
300 CAPSULE ORAL 2 TIMES DAILY
Qty: 180 CAPSULE | Refills: 0 | Status: SHIPPED | OUTPATIENT
Start: 2023-07-24 | End: 2023-10-22

## 2023-08-06 DIAGNOSIS — K21.9 GASTROESOPHAGEAL REFLUX DISEASE WITHOUT ESOPHAGITIS: ICD-10-CM

## 2023-08-07 RX ORDER — PANTOPRAZOLE SODIUM 40 MG/1
TABLET, DELAYED RELEASE ORAL
Qty: 90 TABLET | Refills: 0 | Status: SHIPPED | OUTPATIENT
Start: 2023-08-07

## 2023-08-15 RX ORDER — ATORVASTATIN CALCIUM 40 MG/1
40 TABLET, FILM COATED ORAL NIGHTLY
COMMUNITY
Start: 2023-07-13

## 2023-08-16 ENCOUNTER — OFFICE VISIT (OUTPATIENT)
Dept: PRIMARY CARE CLINIC | Age: 63
End: 2023-08-16
Payer: COMMERCIAL

## 2023-08-16 VITALS
DIASTOLIC BLOOD PRESSURE: 80 MMHG | HEIGHT: 71 IN | SYSTOLIC BLOOD PRESSURE: 130 MMHG | BODY MASS INDEX: 25.2 KG/M2 | HEART RATE: 83 BPM | TEMPERATURE: 99.4 F | WEIGHT: 180 LBS | OXYGEN SATURATION: 97 %

## 2023-08-16 DIAGNOSIS — I63.9 CEREBROVASCULAR ACCIDENT (CVA), UNSPECIFIED MECHANISM (HCC): ICD-10-CM

## 2023-08-16 DIAGNOSIS — Z13.220 ENCOUNTER FOR LIPID SCREENING FOR CARDIOVASCULAR DISEASE: ICD-10-CM

## 2023-08-16 DIAGNOSIS — M54.50 CHRONIC BILATERAL LOW BACK PAIN WITHOUT SCIATICA: ICD-10-CM

## 2023-08-16 DIAGNOSIS — G89.29 CHRONIC BILATERAL LOW BACK PAIN WITHOUT SCIATICA: ICD-10-CM

## 2023-08-16 DIAGNOSIS — E11.9 TYPE 2 DIABETES MELLITUS WITHOUT COMPLICATION, WITHOUT LONG-TERM CURRENT USE OF INSULIN (HCC): Primary | ICD-10-CM

## 2023-08-16 DIAGNOSIS — I10 PRIMARY HYPERTENSION: ICD-10-CM

## 2023-08-16 DIAGNOSIS — Z13.6 ENCOUNTER FOR LIPID SCREENING FOR CARDIOVASCULAR DISEASE: ICD-10-CM

## 2023-08-16 LAB — HBA1C MFR BLD: 5.8 %

## 2023-08-16 PROCEDURE — 3079F DIAST BP 80-89 MM HG: CPT | Performed by: STUDENT IN AN ORGANIZED HEALTH CARE EDUCATION/TRAINING PROGRAM

## 2023-08-16 PROCEDURE — 3017F COLORECTAL CA SCREEN DOC REV: CPT | Performed by: STUDENT IN AN ORGANIZED HEALTH CARE EDUCATION/TRAINING PROGRAM

## 2023-08-16 PROCEDURE — 99214 OFFICE O/P EST MOD 30 MIN: CPT | Performed by: STUDENT IN AN ORGANIZED HEALTH CARE EDUCATION/TRAINING PROGRAM

## 2023-08-16 PROCEDURE — G8427 DOCREV CUR MEDS BY ELIG CLIN: HCPCS | Performed by: STUDENT IN AN ORGANIZED HEALTH CARE EDUCATION/TRAINING PROGRAM

## 2023-08-16 PROCEDURE — 2022F DILAT RTA XM EVC RTNOPTHY: CPT | Performed by: STUDENT IN AN ORGANIZED HEALTH CARE EDUCATION/TRAINING PROGRAM

## 2023-08-16 PROCEDURE — 1036F TOBACCO NON-USER: CPT | Performed by: STUDENT IN AN ORGANIZED HEALTH CARE EDUCATION/TRAINING PROGRAM

## 2023-08-16 PROCEDURE — 83037 HB GLYCOSYLATED A1C HOME DEV: CPT | Performed by: STUDENT IN AN ORGANIZED HEALTH CARE EDUCATION/TRAINING PROGRAM

## 2023-08-16 PROCEDURE — G8419 CALC BMI OUT NRM PARAM NOF/U: HCPCS | Performed by: STUDENT IN AN ORGANIZED HEALTH CARE EDUCATION/TRAINING PROGRAM

## 2023-08-16 PROCEDURE — 3075F SYST BP GE 130 - 139MM HG: CPT | Performed by: STUDENT IN AN ORGANIZED HEALTH CARE EDUCATION/TRAINING PROGRAM

## 2023-08-16 PROCEDURE — 3044F HG A1C LEVEL LT 7.0%: CPT | Performed by: STUDENT IN AN ORGANIZED HEALTH CARE EDUCATION/TRAINING PROGRAM

## 2023-08-16 RX ORDER — TRAMADOL HYDROCHLORIDE 50 MG/1
50 TABLET ORAL 2 TIMES DAILY
Qty: 180 TABLET | Refills: 0 | Status: SHIPPED | OUTPATIENT
Start: 2023-08-16 | End: 2023-11-14

## 2023-08-16 RX ORDER — CLOPIDOGREL BISULFATE 75 MG/1
75 TABLET ORAL DAILY
Qty: 90 TABLET | Refills: 3 | Status: SHIPPED | OUTPATIENT
Start: 2023-08-16 | End: 2024-08-10

## 2023-08-16 ASSESSMENT — PATIENT HEALTH QUESTIONNAIRE - PHQ9
SUM OF ALL RESPONSES TO PHQ QUESTIONS 1-9: 0
SUM OF ALL RESPONSES TO PHQ9 QUESTIONS 1 & 2: 0
1. LITTLE INTEREST OR PLEASURE IN DOING THINGS: 0
SUM OF ALL RESPONSES TO PHQ QUESTIONS 1-9: 0
2. FEELING DOWN, DEPRESSED OR HOPELESS: 0

## 2023-08-16 ASSESSMENT — ENCOUNTER SYMPTOMS
BACK PAIN: 1
RESPIRATORY NEGATIVE: 1
GASTROINTESTINAL NEGATIVE: 1

## 2023-08-16 NOTE — PROGRESS NOTES
Paul Van (:  1960) is a 60 y.o. male,Established patient, here for evaluation of the following chief complaint(s):  3 Month Follow-Up         ASSESSMENT/PLAN:  1. Type 2 diabetes mellitus without complication, without long-term current use of insulin (HCC)  -     POCT glycosylated hemoglobin (Hb A1C)  -     CBC; Future  2. Primary hypertension  3. Chronic bilateral low back pain without sciatica  -     traMADol (ULTRAM) 50 MG tablet; Take 1 tablet by mouth in the morning and at bedtime for 90 days. Intended supply: 3 days. Take lowest dose possible to manage pain Max Daily Amount: 100 mg, Disp-180 tablet, R-0Normal  4. Cerebrovascular accident (CVA), unspecified mechanism ([address])  -     clopidogrel (PLAVIX) 75 MG tablet; Take 1 tablet by mouth daily, Disp-90 tablet, R-3Normal  -     Comprehensive Metabolic Panel; Future  5. Encounter for lipid screening for cardiovascular disease  -     Lipid Panel; Future      Return in about 3 months (around 2023). PDMP reviewed and appropriate; tramadol continues to help patient manage pain and be active     If patient starts to experience hypoglycemic events, advised to call and will stop glimeperide     Subjective   SUBJECTIVE/OBJECTIVE:  HPI    Patient is a 61 y/o M with a PMHz of T2DM, CVA, GERD, HTN and chronic back pain who presents for follow up. T2DM- a1c 5.8 today! He has quit drinking alcohol and has stopped eating candy; he occasionally checks his blood sugars and denies any lows    He has been morning more with PT and is walking more with his walker and cane; he still does not have complete confidence to walk in public without his wheel chair; continues to have some pain in his back, managed with tramadol     HTN- BP well controlled on losartan; denies any chest pain    Due for labs     Review of Systems   Constitutional: Negative. HENT: Negative. Respiratory: Negative. Cardiovascular: Negative. Gastrointestinal: Negative.

## 2023-09-18 NOTE — PLAN OF CARE
Problem: Wound:  Goal: Will show signs of wound healing; wound closure and no evidence of infection  Description: Will show signs of wound healing; wound closure and no evidence of infection  Outcome: Ongoing     Problem: Blood Glucose:  Goal: Ability to maintain appropriate glucose levels will improve  Description: Ability to maintain appropriate glucose levels will improve  Outcome: Ongoing Patient informed/Explanation of wait

## 2023-09-26 ENCOUNTER — TELEPHONE (OUTPATIENT)
Dept: PRIMARY CARE CLINIC | Age: 63
End: 2023-09-26

## 2023-09-26 DIAGNOSIS — G89.29 CHRONIC LOW BACK PAIN WITHOUT SCIATICA, UNSPECIFIED BACK PAIN LATERALITY: ICD-10-CM

## 2023-09-26 DIAGNOSIS — M54.50 CHRONIC LOW BACK PAIN WITHOUT SCIATICA, UNSPECIFIED BACK PAIN LATERALITY: ICD-10-CM

## 2023-09-26 RX ORDER — GABAPENTIN 100 MG/1
300 CAPSULE ORAL 2 TIMES DAILY
Qty: 180 CAPSULE | Refills: 0 | Status: SHIPPED | OUTPATIENT
Start: 2023-09-26 | End: 2023-12-25

## 2023-09-30 ENCOUNTER — APPOINTMENT (OUTPATIENT)
Dept: RADIOLOGY | Facility: HOSPITAL | Age: 63
DRG: 264 | End: 2023-09-30
Payer: COMMERCIAL

## 2023-09-30 ENCOUNTER — HOSPITAL ENCOUNTER (INPATIENT)
Facility: HOSPITAL | Age: 63
LOS: 4 days | Discharge: HOME | DRG: 264 | End: 2023-10-04
Attending: EMERGENCY MEDICINE | Admitting: INTERNAL MEDICINE
Payer: COMMERCIAL

## 2023-09-30 DIAGNOSIS — R79.89 TROPONIN LEVEL ELEVATED: Primary | ICD-10-CM

## 2023-09-30 DIAGNOSIS — E11.649 TYPE 2 DIABETES MELLITUS WITH HYPOGLYCEMIA WITHOUT COMA, WITHOUT LONG-TERM CURRENT USE OF INSULIN (MULTI): ICD-10-CM

## 2023-09-30 DIAGNOSIS — I21.4 NSTEMI (NON-ST ELEVATED MYOCARDIAL INFARCTION) (MULTI): ICD-10-CM

## 2023-09-30 DIAGNOSIS — E61.1 IRON DEFICIENCY: ICD-10-CM

## 2023-09-30 DIAGNOSIS — E16.2 HYPOGLYCEMIA: ICD-10-CM

## 2023-09-30 PROBLEM — I10 HTN (HYPERTENSION): Chronic | Status: ACTIVE | Noted: 2023-09-30

## 2023-09-30 PROBLEM — I63.9 STROKE (MULTI): Chronic | Status: ACTIVE | Noted: 2023-09-30

## 2023-09-30 PROBLEM — E78.5 HLD (HYPERLIPIDEMIA): Chronic | Status: ACTIVE | Noted: 2023-09-30

## 2023-09-30 LAB
ALBUMIN SERPL BCP-MCNC: 4.4 G/DL (ref 3.4–5)
ALP SERPL-CCNC: 86 U/L (ref 33–136)
ALT SERPL W P-5'-P-CCNC: 19 U/L (ref 10–52)
ANION GAP SERPL CALC-SCNC: 16 MMOL/L (ref 10–20)
APTT PPP: 28 SECONDS (ref 27–38)
AST SERPL W P-5'-P-CCNC: 32 U/L (ref 9–39)
BASOPHILS # BLD AUTO: 0.06 X10*3/UL (ref 0–0.1)
BASOPHILS NFR BLD AUTO: 0.9 %
BILIRUB SERPL-MCNC: 1 MG/DL (ref 0–1.2)
BUN SERPL-MCNC: 10 MG/DL (ref 6–23)
CALCIUM SERPL-MCNC: 9.4 MG/DL (ref 8.6–10.3)
CARDIAC TROPONIN I PNL SERPL HS: 114 NG/L (ref 0–20)
CARDIAC TROPONIN I PNL SERPL HS: 135 NG/L (ref 0–20)
CARDIAC TROPONIN I PNL SERPL HS: 246 NG/L (ref 0–20)
CHLORIDE SERPL-SCNC: 99 MMOL/L (ref 98–107)
CO2 SERPL-SCNC: 22 MMOL/L (ref 21–32)
CREAT SERPL-MCNC: 0.89 MG/DL (ref 0.5–1.3)
EOSINOPHIL # BLD AUTO: 0.17 X10*3/UL (ref 0–0.7)
EOSINOPHIL NFR BLD AUTO: 2.4 %
ERYTHROCYTE [DISTWIDTH] IN BLOOD BY AUTOMATED COUNT: 17.3 % (ref 11.5–14.5)
GFR SERPL CREATININE-BSD FRML MDRD: >90 ML/MIN/1.73M*2
GLUCOSE BLD MANUAL STRIP-MCNC: 104 MG/DL (ref 74–99)
GLUCOSE BLD MANUAL STRIP-MCNC: 152 MG/DL (ref 74–99)
GLUCOSE BLD MANUAL STRIP-MCNC: 228 MG/DL (ref 74–99)
GLUCOSE BLD MANUAL STRIP-MCNC: 231 MG/DL (ref 74–99)
GLUCOSE BLD MANUAL STRIP-MCNC: 240 MG/DL (ref 74–99)
GLUCOSE BLD MANUAL STRIP-MCNC: 74 MG/DL (ref 74–99)
GLUCOSE SERPL-MCNC: 221 MG/DL (ref 74–99)
HCT VFR BLD AUTO: 29.9 % (ref 41–52)
HGB BLD-MCNC: 9.7 G/DL (ref 13.5–17.5)
IMM GRANULOCYTES # BLD AUTO: 0.03 X10*3/UL (ref 0–0.7)
IMM GRANULOCYTES NFR BLD AUTO: 0.4 % (ref 0–0.9)
INR PPP: 1 (ref 0.9–1.1)
LYMPHOCYTES # BLD AUTO: 0.98 X10*3/UL (ref 1.2–4.8)
LYMPHOCYTES NFR BLD AUTO: 14 %
MCH RBC QN AUTO: 28 PG (ref 26–34)
MCHC RBC AUTO-ENTMCNC: 32.4 G/DL (ref 32–36)
MCV RBC AUTO: 86 FL (ref 80–100)
MONOCYTES # BLD AUTO: 0.48 X10*3/UL (ref 0.1–1)
MONOCYTES NFR BLD AUTO: 6.9 %
NEUTROPHILS # BLD AUTO: 5.26 X10*3/UL (ref 1.2–7.7)
NEUTROPHILS NFR BLD AUTO: 75.4 %
NRBC BLD-RTO: 0 /100 WBCS (ref 0–0)
PLATELET # BLD AUTO: 324 X10*3/UL (ref 150–450)
PMV BLD AUTO: 9 FL (ref 7.5–11.5)
POC FINGERSTICK BLOOD GLUCOSE: 152 MG/DL (ref 70–100)
POC FINGERSTICK BLOOD GLUCOSE: 156 MG/DL (ref 70–100)
POC FINGERSTICK BLOOD GLUCOSE: 44 MG/DL (ref 70–100)
POTASSIUM SERPL-SCNC: 5.4 MMOL/L (ref 3.5–5.3)
PROT SERPL-MCNC: 7.9 G/DL (ref 6.4–8.2)
PROTHROMBIN TIME: 11.8 SECONDS (ref 9.8–12.8)
RBC # BLD AUTO: 3.47 X10*6/UL (ref 4.5–5.9)
SODIUM SERPL-SCNC: 132 MMOL/L (ref 136–145)
WBC # BLD AUTO: 7 X10*3/UL (ref 4.4–11.3)

## 2023-09-30 PROCEDURE — 80053 COMPREHEN METABOLIC PANEL: CPT | Performed by: EMERGENCY MEDICINE

## 2023-09-30 PROCEDURE — 82947 ASSAY GLUCOSE BLOOD QUANT: CPT

## 2023-09-30 PROCEDURE — 83036 HEMOGLOBIN GLYCOSYLATED A1C: CPT | Performed by: PHYSICIAN ASSISTANT

## 2023-09-30 PROCEDURE — 0JBR0ZZ EXCISION OF LEFT FOOT SUBCUTANEOUS TISSUE AND FASCIA, OPEN APPROACH: ICD-10-PCS | Performed by: STUDENT IN AN ORGANIZED HEALTH CARE EDUCATION/TRAINING PROGRAM

## 2023-09-30 PROCEDURE — 36415 COLL VENOUS BLD VENIPUNCTURE: CPT | Performed by: EMERGENCY MEDICINE

## 2023-09-30 PROCEDURE — 2500000004 HC RX 250 GENERAL PHARMACY W/ HCPCS (ALT 636 FOR OP/ED): Performed by: PHYSICIAN ASSISTANT

## 2023-09-30 PROCEDURE — 82962 GLUCOSE BLOOD TEST: CPT | Performed by: EMERGENCY MEDICINE

## 2023-09-30 PROCEDURE — 2500000001 HC RX 250 WO HCPCS SELF ADMINISTERED DRUGS (ALT 637 FOR MEDICARE OP): Performed by: EMERGENCY MEDICINE

## 2023-09-30 PROCEDURE — 96374 THER/PROPH/DIAG INJ IV PUSH: CPT

## 2023-09-30 PROCEDURE — 2500000005 HC RX 250 GENERAL PHARMACY W/O HCPCS: Performed by: EMERGENCY MEDICINE

## 2023-09-30 PROCEDURE — 99223 1ST HOSP IP/OBS HIGH 75: CPT | Performed by: PHYSICIAN ASSISTANT

## 2023-09-30 PROCEDURE — 70450 CT HEAD/BRAIN W/O DYE: CPT | Mod: MG

## 2023-09-30 PROCEDURE — 84484 ASSAY OF TROPONIN QUANT: CPT | Performed by: EMERGENCY MEDICINE

## 2023-09-30 PROCEDURE — 85610 PROTHROMBIN TIME: CPT | Performed by: EMERGENCY MEDICINE

## 2023-09-30 PROCEDURE — 99285 EMERGENCY DEPT VISIT HI MDM: CPT | Performed by: EMERGENCY MEDICINE

## 2023-09-30 PROCEDURE — 70450 CT HEAD/BRAIN W/O DYE: CPT | Performed by: RADIOLOGY

## 2023-09-30 PROCEDURE — 84484 ASSAY OF TROPONIN QUANT: CPT | Performed by: PHYSICIAN ASSISTANT

## 2023-09-30 PROCEDURE — 2060000001 HC INTERMEDIATE ICU ROOM DAILY

## 2023-09-30 PROCEDURE — 85730 THROMBOPLASTIN TIME PARTIAL: CPT | Performed by: EMERGENCY MEDICINE

## 2023-09-30 PROCEDURE — 2500000001 HC RX 250 WO HCPCS SELF ADMINISTERED DRUGS (ALT 637 FOR MEDICARE OP): Performed by: PHYSICIAN ASSISTANT

## 2023-09-30 PROCEDURE — 85025 COMPLETE CBC W/AUTO DIFF WBC: CPT | Performed by: EMERGENCY MEDICINE

## 2023-09-30 PROCEDURE — 36415 COLL VENOUS BLD VENIPUNCTURE: CPT | Performed by: PHYSICIAN ASSISTANT

## 2023-09-30 RX ORDER — PANTOPRAZOLE SODIUM 40 MG/10ML
40 INJECTION, POWDER, LYOPHILIZED, FOR SOLUTION INTRAVENOUS
Status: DISCONTINUED | OUTPATIENT
Start: 2023-10-01 | End: 2023-10-04 | Stop reason: HOSPADM

## 2023-09-30 RX ORDER — ACETAMINOPHEN 160 MG/5ML
650 SUSPENSION ORAL EVERY 4 HOURS PRN
Status: DISCONTINUED | OUTPATIENT
Start: 2023-09-30 | End: 2023-09-30

## 2023-09-30 RX ORDER — ACETAMINOPHEN 650 MG/1
650 SUPPOSITORY RECTAL EVERY 4 HOURS PRN
Status: DISCONTINUED | OUTPATIENT
Start: 2023-09-30 | End: 2023-10-04 | Stop reason: HOSPADM

## 2023-09-30 RX ORDER — POLYETHYLENE GLYCOL 3350 17 G/17G
17 POWDER, FOR SOLUTION ORAL DAILY
Status: DISCONTINUED | OUTPATIENT
Start: 2023-09-30 | End: 2023-10-04 | Stop reason: HOSPADM

## 2023-09-30 RX ORDER — PANTOPRAZOLE SODIUM 40 MG/1
40 TABLET, DELAYED RELEASE ORAL
Status: DISCONTINUED | OUTPATIENT
Start: 2023-10-01 | End: 2023-09-30

## 2023-09-30 RX ORDER — ACETAMINOPHEN 325 MG/1
650 TABLET ORAL EVERY 4 HOURS PRN
Status: DISCONTINUED | OUTPATIENT
Start: 2023-09-30 | End: 2023-10-04 | Stop reason: HOSPADM

## 2023-09-30 RX ORDER — TALC
3 POWDER (GRAM) TOPICAL NIGHTLY PRN
Status: DISCONTINUED | OUTPATIENT
Start: 2023-09-30 | End: 2023-10-04 | Stop reason: HOSPADM

## 2023-09-30 RX ORDER — ONDANSETRON HYDROCHLORIDE 2 MG/ML
4 INJECTION, SOLUTION INTRAVENOUS EVERY 6 HOURS PRN
Status: DISCONTINUED | OUTPATIENT
Start: 2023-09-30 | End: 2023-10-04 | Stop reason: HOSPADM

## 2023-09-30 RX ORDER — NAPROXEN SODIUM 220 MG/1
81 TABLET, FILM COATED ORAL DAILY
Status: DISCONTINUED | OUTPATIENT
Start: 2023-09-30 | End: 2023-10-04 | Stop reason: HOSPADM

## 2023-09-30 RX ORDER — NAPROXEN SODIUM 220 MG/1
324 TABLET, FILM COATED ORAL ONCE
Status: COMPLETED | OUTPATIENT
Start: 2023-09-30 | End: 2023-09-30

## 2023-09-30 RX ORDER — DEXTROSE 50 % IN WATER (D50W) INTRAVENOUS SYRINGE
25 ONCE
Status: COMPLETED | OUTPATIENT
Start: 2023-09-30 | End: 2023-09-30

## 2023-09-30 RX ORDER — NAPROXEN SODIUM 220 MG/1
325 TABLET, FILM COATED ORAL ONCE
Status: DISCONTINUED | OUTPATIENT
Start: 2023-09-30 | End: 2023-09-30

## 2023-09-30 RX ORDER — ENOXAPARIN SODIUM 100 MG/ML
40 INJECTION SUBCUTANEOUS EVERY 24 HOURS
Status: DISCONTINUED | OUTPATIENT
Start: 2023-09-30 | End: 2023-10-01

## 2023-09-30 RX ORDER — ACETAMINOPHEN 160 MG/5ML
650 SUSPENSION ORAL EVERY 4 HOURS PRN
Status: DISCONTINUED | OUTPATIENT
Start: 2023-09-30 | End: 2023-10-01

## 2023-09-30 RX ORDER — ACETAMINOPHEN 325 MG/1
650 TABLET ORAL EVERY 4 HOURS PRN
Status: DISCONTINUED | OUTPATIENT
Start: 2023-09-30 | End: 2023-09-30

## 2023-09-30 RX ORDER — ACETAMINOPHEN 650 MG/1
650 SUPPOSITORY RECTAL EVERY 4 HOURS PRN
Status: DISCONTINUED | OUTPATIENT
Start: 2023-09-30 | End: 2023-09-30 | Stop reason: SDUPTHER

## 2023-09-30 RX ADMIN — ACETAMINOPHEN 650 MG: 325 TABLET, FILM COATED ORAL at 20:52

## 2023-09-30 RX ADMIN — ENOXAPARIN SODIUM 40 MG: 40 INJECTION SUBCUTANEOUS at 17:26

## 2023-09-30 RX ADMIN — ASPIRIN 81 MG CHEWABLE TABLET 324 MG: 81 TABLET CHEWABLE at 13:11

## 2023-09-30 RX ADMIN — Medication 3 MG: at 20:51

## 2023-09-30 RX ADMIN — ASPIRIN 81 MG CHEWABLE TABLET 81 MG: 81 TABLET CHEWABLE at 17:26

## 2023-09-30 RX ADMIN — DEXTROSE MONOHYDRATE 25 ML: 500 INJECTION PARENTERAL at 08:25

## 2023-09-30 SDOH — SOCIAL STABILITY: SOCIAL INSECURITY: WERE YOU ABLE TO COMPLETE ALL THE BEHAVIORAL HEALTH SCREENINGS?: YES

## 2023-09-30 SDOH — SOCIAL STABILITY: SOCIAL INSECURITY: HAVE YOU HAD THOUGHTS OF HARMING ANYONE ELSE?: YES

## 2023-09-30 SDOH — SOCIAL STABILITY: SOCIAL INSECURITY: ABUSE: ADULT

## 2023-09-30 ASSESSMENT — ACTIVITIES OF DAILY LIVING (ADL)
BATHING: INDEPENDENT
DRESSING YOURSELF: INDEPENDENT
TOILETING: INDEPENDENT
HEARING - LEFT EAR: FUNCTIONAL
WALKS IN HOME: INDEPENDENT
PATIENT'S MEMORY ADEQUATE TO SAFELY COMPLETE DAILY ACTIVITIES?: YES
FEEDING YOURSELF: INDEPENDENT
HEARING - RIGHT EAR: FUNCTIONAL
GROOMING: INDEPENDENT
JUDGMENT_ADEQUATE_SAFELY_COMPLETE_DAILY_ACTIVITIES: YES
ADEQUATE_TO_COMPLETE_ADL: YES

## 2023-09-30 ASSESSMENT — COGNITIVE AND FUNCTIONAL STATUS - GENERAL
CLIMB 3 TO 5 STEPS WITH RAILING: TOTAL
WALKING IN HOSPITAL ROOM: A LOT
MOVING FROM LYING ON BACK TO SITTING ON SIDE OF FLAT BED WITH BEDRAILS: A LITTLE
DRESSING REGULAR LOWER BODY CLOTHING: A LITTLE
MOVING TO AND FROM BED TO CHAIR: A LOT
EATING MEALS: A LITTLE
STANDING UP FROM CHAIR USING ARMS: A LOT
DAILY ACTIVITIY SCORE: 18
PERSONAL GROOMING: A LITTLE
DRESSING REGULAR UPPER BODY CLOTHING: A LITTLE
TURNING FROM BACK TO SIDE WHILE IN FLAT BAD: A LITTLE
HELP NEEDED FOR BATHING: A LITTLE
TOILETING: A LITTLE
PATIENT BASELINE BEDBOUND: NO
MOBILITY SCORE: 13

## 2023-09-30 ASSESSMENT — COLUMBIA-SUICIDE SEVERITY RATING SCALE - C-SSRS
6. HAVE YOU EVER DONE ANYTHING, STARTED TO DO ANYTHING, OR PREPARED TO DO ANYTHING TO END YOUR LIFE?: NO
1. IN THE PAST MONTH, HAVE YOU WISHED YOU WERE DEAD OR WISHED YOU COULD GO TO SLEEP AND NOT WAKE UP?: NO
2. HAVE YOU ACTUALLY HAD ANY THOUGHTS OF KILLING YOURSELF?: NO

## 2023-09-30 ASSESSMENT — LIFESTYLE VARIABLES
SUBSTANCE_ABUSE_PAST_12_MONTHS: NO
HOW OFTEN DURING THE LAST YEAR HAVE YOU BEEN UNABLE TO REMEMBER WHAT HAPPENED THE NIGHT BEFORE BECAUSE YOU HAD BEEN DRINKING: LESS THAN MONTHLY
HOW MANY STANDARD DRINKS CONTAINING ALCOHOL DO YOU HAVE ON A TYPICAL DAY: 5 OR 6
HOW OFTEN DO YOU HAVE 6 OR MORE DRINKS ON ONE OCCASION: MONTHLY
AUDIT TOTAL SCORE: 16
AUDIT-C TOTAL SCORE: 7
PRESCIPTION_ABUSE_PAST_12_MONTHS: NO
AUDIT-C TOTAL SCORE: 7
HAS A RELATIVE, FRIEND, DOCTOR, OR ANOTHER HEALTH PROFESSIONAL EXPRESSED CONCERN ABOUT YOUR DRINKING OR SUGGESTED YOU CUT DOWN: YES, BUT NOT IN THE LAST YEAR
HOW OFTEN DURING THE LAST YEAR HAVE YOU FAILED TO DO WHAT WAS NORMALLY EXPECTED FROM YOU BECAUSE OF DRINKING: LESS THAN MONTHLY
HOW OFTEN DO YOU HAVE A DRINK CONTAINING ALCOHOL: 2-3 TIMES A WEEK
HOW OFTEN DURING THE LAST YEAR HAVE YOU HAD A FEELING OF GUILT OR REMORSE AFTER DRINKING: LESS THAN MONTHLY
SKIP TO QUESTIONS 9-10: 0
HOW OFTEN DURING THE LAST YEAR HAVE YOU FOUND THAT YOU WERE NOT ABLE TO STOP DRINKING ONCE YOU HAD STARTED: LESS THAN MONTHLY
HAVE YOU OR SOMEONE ELSE BEEN INJURED AS A RESULT OF YOUR DRINKING: YES, BUT NOT IN THE LAST YEAR
HOW OFTEN DURING THE LAST YEAR HAVE YOU NEEDED AN ALCOHOLIC DRINK FIRST THING IN THE MORNING TO GET YOURSELF GOING AFTER A NIGHT OF HEAVY DRINKING: LESS THAN MONTHLY

## 2023-09-30 ASSESSMENT — PAIN SCALES - GENERAL
PAINLEVEL_OUTOF10: 2
PAINLEVEL_OUTOF10: 2
PAINLEVEL_OUTOF10: 5 - MODERATE PAIN
PAINLEVEL_OUTOF10: 3

## 2023-09-30 ASSESSMENT — PAIN - FUNCTIONAL ASSESSMENT: PAIN_FUNCTIONAL_ASSESSMENT: 0-10

## 2023-09-30 ASSESSMENT — PATIENT HEALTH QUESTIONNAIRE - PHQ9
2. FEELING DOWN, DEPRESSED OR HOPELESS: NOT AT ALL
1. LITTLE INTEREST OR PLEASURE IN DOING THINGS: NOT AT ALL
SUM OF ALL RESPONSES TO PHQ9 QUESTIONS 1 & 2: 0

## 2023-09-30 NOTE — Clinical Note
Radial Cocktail given by MD Raymundo - 10mls (Nitroglycerin 500mcg, Verapamil 5mg, Heparinized Saline 45mls)

## 2023-09-30 NOTE — H&P
"History Of Present Illness  Santana Mayo is a 63 y.o. male with PMH of prior stroke with residual left sided deficits, HLD, NIDDM II, ..., who presents with concern for a stroke.    ED course: HR 79, RR 20, /89, pulse ox 99% on room air. No leukocytosis, Hgb 9.7. Chemistries remarkable for glucose 44 on arrival. Troponin 135 -> 114. CT head shows no acute intracranial process. Was given 1 amp D50 with glucose increased to 221.      Past Medical History  As above    Surgical History  He has a past surgical history that includes CT angio abdomen pelvis w and or wo IV IV contrast (1/30/2023).     Social History  He has no history on file for tobacco use, alcohol use, and drug use.    Family History  No family history on file.     Allergies  Penicillins and Penicillin v    Review of Systems     Physical Exam     Last Recorded Vitals  Blood pressure 165/77, pulse 60, resp. rate 12, height 1.803 m (5' 11\"), weight 81 kg (178 lb 9.2 oz), SpO2 98 %.    Relevant Results  {If you would like to pull in Medications, type .meds     If you would like to pull in Lab results for the last 24 hours, type .[id number]    If you would like to pull in Imaging results, type .[id number] :99}      ***     Assessment/Plan   Principal Problem:    Troponin level elevated      ***       I spent *** minutes in the professional and overall care of this patient.      Jose R Frias PA-C    "

## 2023-09-30 NOTE — Clinical Note
Is the patient on isolation?: No   Do you expect the patient to require telemetry (informational-only for bed management): Yes   Bed request comments: step down

## 2023-09-30 NOTE — ED PROVIDER NOTES
HPI   No chief complaint on file.      The patient presents via EMS as a stroke alert.  He has a history of prior stroke with left sided residual weakness mostly of the left upper extremity that is contracted and nearly paralyzed. Last known normal was 6:45 AM and patient began noticing that he could not control his left lower extremity which is a little bit out of the norm for him and EMS was called.  Upon EMS arrival they checked his blood sugar and it was noted to be 60 he is a diabetic and he was provided oral glucose as he was having trouble with establishing peripheral IV access.  On arrival here the patient went directly to CAT scanner as a stroke alert           NIH Stroke Scale: 2                    No data recorded       NIH Stroke Scale: 2          Patient History   History reviewed. No pertinent past medical history.  Past Surgical History:   Procedure Laterality Date    CT ABDOMEN PELVIS ANGIOGRAM W AND/OR WO IV CONTRAST  1/30/2023    CT ABDOMEN PELVIS ANGIOGRAM W AND/OR WO IV CONTRAST 1/30/2023 [facility]     No family history on file.  Social History     Tobacco Use    Smoking status: Not on file    Smokeless tobacco: Not on file   Substance Use Topics    Alcohol use: Not on file    Drug use: Not on file       Physical Exam   ED Triage Vitals   Temp Pulse Resp BP   -- -- -- --      SpO2 Temp src Heart Rate Source Patient Position   -- -- -- --      BP Location FiO2 (%)     -- --       Physical Exam  Vitals and nursing note reviewed.   Constitutional:       General: He is not in acute distress.     Appearance: He is well-developed.   HENT:      Head: Normocephalic and atraumatic.   Eyes:      Conjunctiva/sclera: Conjunctivae normal.   Cardiovascular:      Rate and Rhythm: Normal rate and regular rhythm.      Heart sounds: No murmur heard.  Pulmonary:      Effort: Pulmonary effort is normal. No respiratory distress.      Breath sounds: Normal breath sounds.   Abdominal:      Palpations:  Abdomen is soft.      Tenderness: There is no abdominal tenderness.   Musculoskeletal:         General: No swelling.      Cervical back: Neck supple.      Right lower leg: No edema.      Left lower leg: No edema.   Skin:     General: Skin is warm and dry.      Capillary Refill: Capillary refill takes less than 2 seconds.   Neurological:      Mental Status: He is alert and oriented to person, place, and time. Mental status is at baseline.      Sensory: No sensory deficit.      Motor: Weakness present.      Comments: Patient with baseline residual left upper extremity weakness from prior stroke   Psychiatric:         Mood and Affect: Mood normal.         ED Course & MDM   ED Course as of 09/30/23 1313   Sat Sep 30, 2023   1221 POTASSIUM(!): 5.4 [SG]   1221 Troponin I, High Sensitivity(!!): 135 [SG]      ED Course User Index  [SG] Teresa Palmer MD         Diagnoses as of 09/30/23 1313   Troponin level elevated   Hypoglycemia       Medical Decision Making  Patient was initially presented to the emergency department as a stroke alert by EMS due to concern for worsening left lower extremity weakness however upon arrival he was hypoglycemic with a blood sugar of 44 he also was low blood sugar by EMS at 60 and had received oral glucose.  He we gave him a amp of D50 and CAT scan with improvement in his blood sugar and his NIH stroke scale was 2 at his baseline from his old stroke deficits.  CT Noncon head was obtained and negative for any acute findings did show remote infarct.  Stroke alert was canceled due to his symptoms being due to hypoglycemia.  He was brought to ED room 15 placed on a cardiac monitor peripheral IV access was obtained labs are drawn.    Patient had every hour blood sugar checks which were stable after receiving the D50.  He remained hemodynamically stable his troponin came back elevated at 135 of unclear etiology he denies any history of cardiac issues in the past.  He denies any chest pain or  shortness of breath.  Patient CT scan was reassuring and he is on a sulfonylurea denies ever having a history of hypoglycemia in the past he was agreeable to admission to the hospitalist service for further management and trending of his blood sugars and troponin.    Amount and/or Complexity of Data Reviewed  ECG/medicine tests: independent interpretation performed. Decision-making details documented in ED Course.        Procedure  Procedures     Teresa Palmer MD  09/30/23 0534

## 2023-09-30 NOTE — H&P
History Of Present Illness  Santana Mayo is a 63 y.o. male with PMH of prior stroke with residual left sided deficits, HLD, NIDDM II, who presents with concern for stroke.  States that he woke up this morning his vision was very blurred, unable to see anything effectively.  Presents life alert for EMS assistance.  Does report slurred speech upon EMS arrival.  Denies any headache, increased left-sided weakness from baseline, shortness of breath, chest pain, nausea, vomiting, diarrhea.  EMS checked his glucose levels, noted to be in the 60s and was given an amp of D50.  Patient denies any recent medication changes.  States he does not take any insulin.  States he has been taking his antidiabetic regimen as prescribed.  States his glucose levels have been well controlled recently.    ED course: HR 79, RR 20, /89, pulse ox 99% on room air.  No leukocytosis, Hgb 9.7.  Glucose 44 on arrival.  Chemistries remarkable for K5.4, troponin 135 -> 114.  CT head shows no acute intracranial process.  Given amp of D50 in ED with glucose increased to 200s.  Given hypoglycemia and elevated troponin decision made for admission.     Past Medical History  He has no past medical history on file.    Surgical History  He has a past surgical history that includes CT angio abdomen pelvis w and or wo IV IV contrast (1/30/2023).     Social History  Former smoker quit ~10 years ago.  Drinks few beers every couple of days, last drink 5 days ago    Family History  No family history on file.     Allergies  Penicillins and Penicillin v    ROS: 12 point ROS reviewed, negative except for as noted above     Physical Exam  Constitutional:       General: He is not in acute distress.     Appearance: Normal appearance. He is normal weight. He is not ill-appearing or toxic-appearing.   HENT:      Head: Normocephalic and atraumatic.   Eyes:      Extraocular Movements: Extraocular movements intact.      Pupils: Pupils are equal, round, and reactive  "to light.   Cardiovascular:      Rate and Rhythm: Normal rate and regular rhythm.      Pulses: Normal pulses.      Heart sounds: Normal heart sounds. No murmur heard.     No friction rub. No gallop.   Pulmonary:      Effort: Pulmonary effort is normal. No respiratory distress.      Breath sounds: Normal breath sounds. No wheezing, rhonchi or rales.   Abdominal:      General: Bowel sounds are normal. There is no distension.      Palpations: Abdomen is soft. There is no mass.      Tenderness: There is no abdominal tenderness.   Neurological:      Mental Status: He is alert and oriented to person, place, and time. Mental status is at baseline.      Motor: Weakness (Left upper and lower extremity weakness at baseline) present.   Psychiatric:         Mood and Affect: Mood normal.         Behavior: Behavior normal.          Last Recorded Vitals  Blood pressure 165/77, pulse 60, resp. rate 12, height 1.803 m (5' 11\"), weight 81 kg (178 lb 9.2 oz), SpO2 98 %.    Relevant Results      Results for orders placed or performed during the hospital encounter of 09/30/23 (from the past 24 hour(s))   POCT glucose   Result Value Ref Range    POC Fingerstick Blood Glucose 44 (A) 70 - 100 mg/dl   POCT glucose   Result Value Ref Range    POC Fingerstick Blood Glucose 156 (A) 70 - 100 mg/dl   CBC and Auto Differential   Result Value Ref Range    WBC 7.0 4.4 - 11.3 x10*3/uL    nRBC 0.0 0.0 - 0.0 /100 WBCs    RBC 3.47 (L) 4.50 - 5.90 x10*6/uL    Hemoglobin 9.7 (L) 13.5 - 17.5 g/dL    Hematocrit 29.9 (L) 41.0 - 52.0 %    MCV 86 80 - 100 fL    MCH 28.0 26.0 - 34.0 pg    MCHC 32.4 32.0 - 36.0 g/dL    RDW 17.3 (H) 11.5 - 14.5 %    Platelets 324 150 - 450 x10*3/uL    MPV 9.0 7.5 - 11.5 fL    Neutrophils % 75.4 40.0 - 80.0 %    Immature Granulocytes %, Automated 0.4 0.0 - 0.9 %    Lymphocytes % 14.0 13.0 - 44.0 %    Monocytes % 6.9 2.0 - 10.0 %    Eosinophils % 2.4 0.0 - 6.0 %    Basophils % 0.9 0.0 - 2.0 %    Neutrophils Absolute 5.26 1.20 - " 7.70 x10*3/uL    Immature Granulocytes Absolute, Automated 0.03 0.00 - 0.70 x10*3/uL    Lymphocytes Absolute 0.98 (L) 1.20 - 4.80 x10*3/uL    Monocytes Absolute 0.48 0.10 - 1.00 x10*3/uL    Eosinophils Absolute 0.17 0.00 - 0.70 x10*3/uL    Basophils Absolute 0.06 0.00 - 0.10 x10*3/uL   Comprehensive metabolic panel   Result Value Ref Range    Glucose 221 (H) 74 - 99 mg/dL    Sodium 132 (L) 136 - 145 mmol/L    Potassium 5.4 (H) 3.5 - 5.3 mmol/L    Chloride 99 98 - 107 mmol/L    Bicarbonate 22 21 - 32 mmol/L    Anion Gap 16 10 - 20 mmol/L    Urea Nitrogen 10 6 - 23 mg/dL    Creatinine 0.89 0.50 - 1.30 mg/dL    eGFR >90 >60 mL/min/1.73m*2    Calcium 9.4 8.6 - 10.3 mg/dL    Albumin 4.4 3.4 - 5.0 g/dL    Alkaline Phosphatase 86 33 - 136 U/L    Total Protein 7.9 6.4 - 8.2 g/dL    AST 32 9 - 39 U/L    Bilirubin, Total 1.0 0.0 - 1.2 mg/dL    ALT 19 10 - 52 U/L   Troponin I, High Sensitivity   Result Value Ref Range    Troponin I, High Sensitivity 135 (HH) 0 - 20 ng/L   Protime-INR   Result Value Ref Range    Protime 11.8 9.8 - 12.8 seconds    INR 1.0 0.9 - 1.1   APTT   Result Value Ref Range    aPTT 28 27 - 38 seconds   POCT glucose   Result Value Ref Range    POC Fingerstick Blood Glucose 152 (A) 70 - 100 mg/dl   POCT GLUCOSE   Result Value Ref Range    POCT Glucose 152 (H) 74 - 99 mg/dL   Troponin I, High Sensitivity   Result Value Ref Range    Troponin I, High Sensitivity 114 (HH) 0 - 20 ng/L   POCT GLUCOSE   Result Value Ref Range    POCT Glucose 104 (H) 74 - 99 mg/dL      CT brain attack head wo IV contrast    Result Date: 9/30/2023  Interpreted By:  Schoenberger, Joseph, STUDY: CT BRAIN ATTACK HEAD WO IV CONTRAST;  9/30/2023 8:29 am   INDICATION: Signs/Symptoms:stroke alert.   COMPARISON: None.   ACCESSION NUMBER(S): [id number]   ORDERING CLINICIAN: RUDOLPH SCHULTZ   TECHNIQUE: Noncontrast axial CT scan of head was performed. Angled reformats in brain and bone windows were generated. The images were reviewed in  bone, brain, blood and soft tissue windows.   FINDINGS: CSF Spaces: Enlarged due to parenchymal volume loss. Normal configuration intact basal cisterns. There is no extraaxial fluid collection.   Parenchyma: Periventricular deep white matter hypoattenuation consistent with small vessel ischemic white matter demyelination. Remote infarct extending from the posterior right frontal deep white matter into the right basal ganglia. Stroke alert the grey-white differentiation is intact. There is no mass effect or midline shift. There is no intracranial hemorrhage.   Calvarium: The calvarium is unremarkable.   Paranasal sinuses and mastoids: Visualized paranasal sinuses and mastoids are clear.       No evidence of acute cortical infarct or intracranial hemorrhage.   Atrophy and chronic small vessel ischemic white matter demyelination. Remote deep white matter/basal ganglia infarct right hemisphere.   MACRO: .  No definite acute finding. Atrophy and chronic ischemic white matter demyelination. Joseph Schoenberger discussed the significance and urgency of this critical finding by telephone with  RUDOLPH SCHULTZ on 9/30/2023 at 8:38 am.  (**-RCF-**) Findings:  See findings.     Signed by: Joseph Schoenberger 9/30/2023 8:41 AM Dictation workstation:   [id number]       Assessment/Plan   Principal Problem:    Troponin level elevated  Active Problems:    Type II diabetes mellitus with hypoglycemia (CMS/HCC)    Stroke (CMS/HCC)    HTN (hypertension)    HLD (hyperlipidemia)      Elevated troponin  Will continue to trend. Cardiology consulted, appreciate further recs. Continue to monitor on tele  DM II with hypoglycemia  Presumed hypoglycemic episode resulting in blurred vision. Holding home antidiabetic agents for now. Frequent glucose checks. Endocrine consulted, appreciate further recs. Continue to monitor   Prior stroke  Residual left sided weakness. Continue home medications   HTN  Continue home medications  HLD  Continue  home medications  DVT ppx  SCDs, SQ Lovenox          Jose Rmayelin Frias PA-C

## 2023-10-01 LAB
ALBUMIN SERPL BCP-MCNC: 3.6 G/DL (ref 3.4–5)
ALP SERPL-CCNC: 71 U/L (ref 33–136)
ALT SERPL W P-5'-P-CCNC: 14 U/L (ref 10–52)
ANION GAP SERPL CALC-SCNC: 11 MMOL/L (ref 10–20)
APTT PPP: 31 SECONDS (ref 27–38)
AST SERPL W P-5'-P-CCNC: 22 U/L (ref 9–39)
BASOPHILS # BLD AUTO: 0.06 X10*3/UL (ref 0–0.1)
BASOPHILS NFR BLD AUTO: 0.8 %
BILIRUB SERPL-MCNC: 0.8 MG/DL (ref 0–1.2)
BUN SERPL-MCNC: 17 MG/DL (ref 6–23)
CALCIUM SERPL-MCNC: 8.8 MG/DL (ref 8.6–10.3)
CHLORIDE SERPL-SCNC: 102 MMOL/L (ref 98–107)
CO2 SERPL-SCNC: 25 MMOL/L (ref 21–32)
CREAT SERPL-MCNC: 1.08 MG/DL (ref 0.5–1.3)
EOSINOPHIL # BLD AUTO: 0.23 X10*3/UL (ref 0–0.7)
EOSINOPHIL NFR BLD AUTO: 3.1 %
ERYTHROCYTE [DISTWIDTH] IN BLOOD BY AUTOMATED COUNT: 17.4 % (ref 11.5–14.5)
EST. AVERAGE GLUCOSE BLD GHB EST-MCNC: 114 MG/DL
GFR SERPL CREATININE-BSD FRML MDRD: 77 ML/MIN/1.73M*2
GLUCOSE BLD MANUAL STRIP-MCNC: 143 MG/DL (ref 74–99)
GLUCOSE BLD MANUAL STRIP-MCNC: 199 MG/DL (ref 74–99)
GLUCOSE BLD MANUAL STRIP-MCNC: 210 MG/DL (ref 74–99)
GLUCOSE BLD MANUAL STRIP-MCNC: 234 MG/DL (ref 74–99)
GLUCOSE SERPL-MCNC: 176 MG/DL (ref 74–99)
HBA1C MFR BLD: 5.6 %
HCT VFR BLD AUTO: 25.7 % (ref 41–52)
HGB BLD-MCNC: 8.1 G/DL (ref 13.5–17.5)
IMM GRANULOCYTES # BLD AUTO: 0.03 X10*3/UL (ref 0–0.7)
IMM GRANULOCYTES NFR BLD AUTO: 0.4 % (ref 0–0.9)
LYMPHOCYTES # BLD AUTO: 1.36 X10*3/UL (ref 1.2–4.8)
LYMPHOCYTES NFR BLD AUTO: 18.5 %
MCH RBC QN AUTO: 27 PG (ref 26–34)
MCHC RBC AUTO-ENTMCNC: 31.5 G/DL (ref 32–36)
MCV RBC AUTO: 86 FL (ref 80–100)
MONOCYTES # BLD AUTO: 0.69 X10*3/UL (ref 0.1–1)
MONOCYTES NFR BLD AUTO: 9.4 %
NEUTROPHILS # BLD AUTO: 4.97 X10*3/UL (ref 1.2–7.7)
NEUTROPHILS NFR BLD AUTO: 67.8 %
NRBC BLD-RTO: 0 /100 WBCS (ref 0–0)
PLATELET # BLD AUTO: 268 X10*3/UL (ref 150–450)
PLATELET # BLD AUTO: 295 X10*3/UL (ref 150–450)
PMV BLD AUTO: 9.5 FL (ref 7.5–11.5)
POTASSIUM SERPL-SCNC: 4.1 MMOL/L (ref 3.5–5.3)
PROT SERPL-MCNC: 6.5 G/DL (ref 6.4–8.2)
RBC # BLD AUTO: 3 X10*6/UL (ref 4.5–5.9)
SODIUM SERPL-SCNC: 134 MMOL/L (ref 136–145)
UFH PPP CHRO-ACNC: <0.1 IU/ML
WBC # BLD AUTO: 7.3 X10*3/UL (ref 4.4–11.3)

## 2023-10-01 PROCEDURE — 99254 IP/OBS CNSLTJ NEW/EST MOD 60: CPT | Performed by: INTERNAL MEDICINE

## 2023-10-01 PROCEDURE — 2500000001 HC RX 250 WO HCPCS SELF ADMINISTERED DRUGS (ALT 637 FOR MEDICARE OP): Performed by: INTERNAL MEDICINE

## 2023-10-01 PROCEDURE — 99232 SBSQ HOSP IP/OBS MODERATE 35: CPT | Performed by: INTERNAL MEDICINE

## 2023-10-01 PROCEDURE — 84075 ASSAY ALKALINE PHOSPHATASE: CPT | Performed by: PHYSICIAN ASSISTANT

## 2023-10-01 PROCEDURE — 85730 THROMBOPLASTIN TIME PARTIAL: CPT | Performed by: INTERNAL MEDICINE

## 2023-10-01 PROCEDURE — 2500000004 HC RX 250 GENERAL PHARMACY W/ HCPCS (ALT 636 FOR OP/ED): Performed by: PHYSICIAN ASSISTANT

## 2023-10-01 PROCEDURE — C9113 INJ PANTOPRAZOLE SODIUM, VIA: HCPCS | Performed by: PHYSICIAN ASSISTANT

## 2023-10-01 PROCEDURE — 2500000004 HC RX 250 GENERAL PHARMACY W/ HCPCS (ALT 636 FOR OP/ED): Performed by: INTERNAL MEDICINE

## 2023-10-01 PROCEDURE — 2500000001 HC RX 250 WO HCPCS SELF ADMINISTERED DRUGS (ALT 637 FOR MEDICARE OP)

## 2023-10-01 PROCEDURE — 2500000001 HC RX 250 WO HCPCS SELF ADMINISTERED DRUGS (ALT 637 FOR MEDICARE OP): Performed by: PHYSICIAN ASSISTANT

## 2023-10-01 PROCEDURE — 85025 COMPLETE CBC W/AUTO DIFF WBC: CPT | Performed by: PHYSICIAN ASSISTANT

## 2023-10-01 PROCEDURE — 85049 AUTOMATED PLATELET COUNT: CPT | Performed by: INTERNAL MEDICINE

## 2023-10-01 PROCEDURE — 36415 COLL VENOUS BLD VENIPUNCTURE: CPT | Performed by: INTERNAL MEDICINE

## 2023-10-01 PROCEDURE — 99255 IP/OBS CONSLTJ NEW/EST HI 80: CPT | Performed by: INTERNAL MEDICINE

## 2023-10-01 PROCEDURE — 2060000001 HC INTERMEDIATE ICU ROOM DAILY

## 2023-10-01 PROCEDURE — 36415 COLL VENOUS BLD VENIPUNCTURE: CPT | Performed by: PHYSICIAN ASSISTANT

## 2023-10-01 PROCEDURE — 85520 HEPARIN ASSAY: CPT | Performed by: INTERNAL MEDICINE

## 2023-10-01 PROCEDURE — 82947 ASSAY GLUCOSE BLOOD QUANT: CPT

## 2023-10-01 PROCEDURE — 2500000002 HC RX 250 W HCPCS SELF ADMINISTERED DRUGS (ALT 637 FOR MEDICARE OP, ALT 636 FOR OP/ED): Performed by: INTERNAL MEDICINE

## 2023-10-01 RX ORDER — TRAMADOL HYDROCHLORIDE 50 MG/1
TABLET ORAL
Status: COMPLETED
Start: 2023-10-01 | End: 2023-10-01

## 2023-10-01 RX ORDER — HEPARIN SODIUM 5000 [USP'U]/ML
4000 INJECTION, SOLUTION INTRAVENOUS; SUBCUTANEOUS ONCE
Status: COMPLETED | OUTPATIENT
Start: 2023-10-01 | End: 2023-10-01

## 2023-10-01 RX ORDER — HEPARIN SODIUM 5000 [USP'U]/ML
2000-4000 INJECTION, SOLUTION INTRAVENOUS; SUBCUTANEOUS EVERY 4 HOURS PRN
Status: DISCONTINUED | OUTPATIENT
Start: 2023-10-01 | End: 2023-10-04 | Stop reason: HOSPADM

## 2023-10-01 RX ORDER — TRAMADOL HYDROCHLORIDE 50 MG/1
50 TABLET ORAL 2 TIMES DAILY
Status: DISCONTINUED | OUTPATIENT
Start: 2023-10-01 | End: 2023-10-04 | Stop reason: HOSPADM

## 2023-10-01 RX ORDER — ACETAMINOPHEN 160 MG/5ML
650 SOLUTION ORAL EVERY 4 HOURS PRN
Status: DISCONTINUED | OUTPATIENT
Start: 2023-10-01 | End: 2023-10-04 | Stop reason: HOSPADM

## 2023-10-01 RX ORDER — FOLIC ACID 1 MG/1
1 TABLET ORAL DAILY
Status: DISCONTINUED | OUTPATIENT
Start: 2023-10-01 | End: 2023-10-04 | Stop reason: HOSPADM

## 2023-10-01 RX ORDER — DEXTROSE 50 % IN WATER (D50W) INTRAVENOUS SYRINGE
25
Status: DISCONTINUED | OUTPATIENT
Start: 2023-10-01 | End: 2023-10-04 | Stop reason: HOSPADM

## 2023-10-01 RX ORDER — DEXTROSE MONOHYDRATE 100 MG/ML
0.3 INJECTION, SOLUTION INTRAVENOUS ONCE AS NEEDED
Status: DISCONTINUED | OUTPATIENT
Start: 2023-10-01 | End: 2023-10-04 | Stop reason: HOSPADM

## 2023-10-01 RX ORDER — HEPARIN SODIUM 10000 [USP'U]/100ML
0-4000 INJECTION, SOLUTION INTRAVENOUS CONTINUOUS
Status: DISCONTINUED | OUTPATIENT
Start: 2023-10-01 | End: 2023-10-04 | Stop reason: HOSPADM

## 2023-10-01 RX ORDER — INSULIN LISPRO 100 [IU]/ML
0-10 INJECTION, SOLUTION INTRAVENOUS; SUBCUTANEOUS
Status: DISCONTINUED | OUTPATIENT
Start: 2023-10-01 | End: 2023-10-04 | Stop reason: HOSPADM

## 2023-10-01 RX ADMIN — HEPARIN SODIUM 4000 UNITS: 5000 INJECTION INTRAVENOUS; SUBCUTANEOUS at 13:04

## 2023-10-01 RX ADMIN — INSULIN LISPRO 4 UNITS: 100 INJECTION, SOLUTION INTRAVENOUS; SUBCUTANEOUS at 12:54

## 2023-10-01 RX ADMIN — ACETAMINOPHEN 650 MG: 325 TABLET, FILM COATED ORAL at 20:01

## 2023-10-01 RX ADMIN — HEPARIN SODIUM 1200 UNITS/HR: 10000 INJECTION, SOLUTION INTRAVENOUS at 13:03

## 2023-10-01 RX ADMIN — FOLIC ACID 1 MG: 1 TABLET ORAL at 14:26

## 2023-10-01 RX ADMIN — INSULIN LISPRO 4 UNITS: 100 INJECTION, SOLUTION INTRAVENOUS; SUBCUTANEOUS at 17:02

## 2023-10-01 RX ADMIN — TRAMADOL HYDROCHLORIDE 50 MG: 50 TABLET, COATED ORAL at 20:15

## 2023-10-01 RX ADMIN — TRAMADOL HYDROCHLORIDE 50 MG: 50 TABLET, FILM COATED ORAL at 20:15

## 2023-10-01 RX ADMIN — PANTOPRAZOLE SODIUM 40 MG: 40 INJECTION, POWDER, FOR SOLUTION INTRAVENOUS at 06:04

## 2023-10-01 RX ADMIN — TRAMADOL HYDROCHLORIDE 50 MG: 50 TABLET, COATED ORAL at 20:01

## 2023-10-01 RX ADMIN — Medication 3 MG: at 20:00

## 2023-10-01 RX ADMIN — ASPIRIN 81 MG CHEWABLE TABLET 81 MG: 81 TABLET CHEWABLE at 09:11

## 2023-10-01 ASSESSMENT — ENCOUNTER SYMPTOMS
NAUSEA: 0
UNEXPECTED WEIGHT CHANGE: 0
VOMITING: 0
POLYPHAGIA: 0
SHORTNESS OF BREATH: 0
WEAKNESS: 1
FATIGUE: 0
POLYDIPSIA: 0
TREMORS: 0

## 2023-10-01 ASSESSMENT — COGNITIVE AND FUNCTIONAL STATUS - GENERAL
STANDING UP FROM CHAIR USING ARMS: A LOT
PERSONAL GROOMING: A LITTLE
DRESSING REGULAR UPPER BODY CLOTHING: A LOT
STANDING UP FROM CHAIR USING ARMS: A LITTLE
DAILY ACTIVITIY SCORE: 20
MOVING FROM LYING ON BACK TO SITTING ON SIDE OF FLAT BED WITH BEDRAILS: A LITTLE
WALKING IN HOSPITAL ROOM: A LOT
EATING MEALS: A LITTLE
HELP NEEDED FOR BATHING: A LITTLE
TOILETING: A LOT
MOBILITY SCORE: 15
PERSONAL GROOMING: A LOT
MOVING TO AND FROM BED TO CHAIR: A LITTLE
MOBILITY SCORE: 18
WALKING IN HOSPITAL ROOM: A LOT
CLIMB 3 TO 5 STEPS WITH RAILING: A LOT
HELP NEEDED FOR BATHING: A LOT
MOVING TO AND FROM BED TO CHAIR: A LITTLE
CLIMB 3 TO 5 STEPS WITH RAILING: A LOT
DAILY ACTIVITIY SCORE: 13
DRESSING REGULAR LOWER BODY CLOTHING: A LOT
EATING MEALS: A LITTLE
TURNING FROM BACK TO SIDE WHILE IN FLAT BAD: A LITTLE
TOILETING: A LITTLE

## 2023-10-01 ASSESSMENT — PAIN - FUNCTIONAL ASSESSMENT
PAIN_FUNCTIONAL_ASSESSMENT: 0-10

## 2023-10-01 ASSESSMENT — PAIN SCALES - GENERAL
PAINLEVEL_OUTOF10: 0 - NO PAIN
PAINLEVEL_OUTOF10: 5 - MODERATE PAIN
PAINLEVEL_OUTOF10: 8

## 2023-10-01 NOTE — CARE PLAN
The patient's goals for the shift include      The clinical goals for the shift include Maintain glucose WDL throughout the shift.    Over the shift, the patient did not make progress toward the following goals. Barriers to progression include lack of understanding. Recommendations to address these barriers include education.

## 2023-10-01 NOTE — CONSULTS
[facility] Consult Note    Patient Name: Santana Mayo  Patient : 1960  Room/Bed: -A    Date: 10/01/23  Time: 9:50 AM    Referred by Dr. Unger ref. provider found for Blurred Vision     History Of Present Illness:    Santana Mayo is a 63 y.o. male who presented with blurred vision, shakiness, diaphoresis, mild shortness of breath yesterday morning around 6 AM.  He states that rather quickly he decided to call EMS as he was concerned about a recurrent stroke.  He otherwise denies any significant chest pain, palpitations, or lightheadedness.  He states that he is compliant with all of his medical therapy at home.  CBC shows a hemoglobin of 8.1.  BMP shows a serum sodium of 134, serum potassium 4.1, serum creatinine of 1.08.  Troponin was elevated at 135-114-246.  Hemoglobin A1c was 5.6%.  CT scan of the head showed no acute infarct or intracranial hemorrhage.  Echocardiogram done in 2023 showed ejection fraction 60%, normal right ventricular systolic function, mild aortic valve stenosis with a mean pressure gradient of 9.0 mmHg and a dimensionless index of 0.58.  During my exam the patient was resting comfortably in bed      Assessment/Plan:   1.  NSTEMI  Patient presented with atypical symptoms of shakiness, diaphoresis, mild shortness of breath as described in HPI.  Subsequently found to have elevated troponin of 135-114-246.  ECG showed sinus rhythm with a heart rate of 69 bpm no significant ST segment abnormality.  Echocardiogram done in 2023 showed ejection fraction 60%, normal right ventricular systolic function, mild aortic valve stenosis with a mean pressure gradient of 9.0 mmHg and a dimensionless index of 0.58.  We will check limited echocardiogram  Patient has been placed on aspirin therapy. I will place the patient on a heparin infusion.  I discussed possible cardiac catheterization with the patient which she stated he would need to consider  further.  We will keep n.p.o. for possible heart cath tomorrow.    2.  Hypertension  Patient has a history of hypertension which currently appears well controlled.  Patient is to be restarted on his home antihypertensive medical therapy    3.  Dyslipidemia  Patient should be restarted on statin therapy    4.  Diabetes mellitus  Management per hospitalist service    5.  Anemia  Hemoglobin of 8.1 this morning.  We will monitor with the addition of a heparin infusion.    6.  Aortic stenosis  Echocardiogram done in January 2023 showed ejection fraction 60%, normal right ventricular systolic function, mild aortic valve stenosis with a mean pressure gradient of 9.0 mmHg and a dimensionless index of 0.58.  Monitor clinically for now.      Past Medical History:  He has a past medical history of CVA (cerebral vascular accident) (CMS/[facility], located within [facility]), DM (diabetes mellitus) (CMS/[facility], located within [facility]), HLD (hyperlipidemia), and HTN (hypertension).    Past Surgical History:  He has a past surgical history that includes CT angio abdomen pelvis w and or wo IV IV contrast (1/30/2023).      Social History:  He reports that he has quit smoking. His smoking use included cigarettes. He has never used smokeless tobacco. He reports current alcohol use of about 6.0 standard drinks of alcohol per week. He reports that he does not use drugs.    Family History:  No family history on file.     Allergies:  Penicillins and Penicillin v    Outpatient Medications:  No current outpatient medications     ROS:  A 14 point review of systems was done and is negative other than as stated in HPI    Vitals:  Vitals:    09/30/23 1934 09/30/23 2300 10/01/23 0300 10/01/23 0813   BP: 138/74 107/63 114/66 143/72   BP Location: Right arm Right arm Right arm Right arm   Patient Position: Lying Lying Lying Lying   Pulse: 96 91 76 69   Resp: 18 18 18 18   Temp: 36.6 °C (97.8 °F) 36.2 °C (97.1 °F) 36.7 °C (98.1 °F) 36.7 °C (98 °F)   TempSrc: Temporal Temporal Temporal Temporal   SpO2: 94%  92% 93% 94%   Weight:   90.4 kg (199 lb 4.7 oz)    Height:           Physical Exam:     Constitutional: Cooperative, in no acute distress, alert, appears stated age.  Skin: Skin color, texture, turgor normal. No rashes or lesions.  Head: Normocephalic. No masses, lesions, tenderness or abnormalities  Eyes: Extraocular movements are grossly intact.  Mouth and throat: Mucous membranes moist  Neck: Neck supple, no carotid bruits, no JVD  Respiratory: Lungs clear to auscultation, no wheezing or rhonchi, no use of accessory muscles  Chest wall: No scars, normal excursion with respiration  Cardiovascular: Regular rhythm with + murmur  Gastrointestinal: Abdomen soft, nontender. Bowel sounds normal.  Musculoskeletal: Strength equal in upper extremities  Extremities: No pitting edema  Neurologic: Sensation grossly intact, alert and oriented ×3    Intake/Output:   No intake/output data recorded.    Outpatient Medications  No current facility-administered medications on file prior to encounter.     No current outpatient medications on file prior to encounter.       Scheduled medications  aspirin, 81 mg, oral, Daily  enoxaparin, 40 mg, subcutaneous, q24h  pantoprazole, 40 mg, intravenous, Daily before breakfast  polyethylene glycol, 17 g, oral, Daily      Continuous medications     PRN medications  PRN medications: acetaminophen **OR** acetaminophen **OR** acetaminophen, melatonin, ondansetron   No medications prior to admission.       Recent Labs: (past 2 days)  Recent Results (from the past 48 hour(s))   POCT glucose    Collection Time: 09/30/23  8:15 AM   Result Value Ref Range    POC Fingerstick Blood Glucose 44 (A) 70 - 100 mg/dl   POCT glucose    Collection Time: 09/30/23  8:40 AM   Result Value Ref Range    POC Fingerstick Blood Glucose 156 (A) 70 - 100 mg/dl   CBC and Auto Differential    Collection Time: 09/30/23  8:45 AM   Result Value Ref Range    WBC 7.0 4.4 - 11.3 x10*3/uL    nRBC 0.0 0.0 - 0.0 /100 WBCs    RBC  3.47 (L) 4.50 - 5.90 x10*6/uL    Hemoglobin 9.7 (L) 13.5 - 17.5 g/dL    Hematocrit 29.9 (L) 41.0 - 52.0 %    MCV 86 80 - 100 fL    MCH 28.0 26.0 - 34.0 pg    MCHC 32.4 32.0 - 36.0 g/dL    RDW 17.3 (H) 11.5 - 14.5 %    Platelets 324 150 - 450 x10*3/uL    MPV 9.0 7.5 - 11.5 fL    Neutrophils % 75.4 40.0 - 80.0 %    Immature Granulocytes %, Automated 0.4 0.0 - 0.9 %    Lymphocytes % 14.0 13.0 - 44.0 %    Monocytes % 6.9 2.0 - 10.0 %    Eosinophils % 2.4 0.0 - 6.0 %    Basophils % 0.9 0.0 - 2.0 %    Neutrophils Absolute 5.26 1.20 - 7.70 x10*3/uL    Immature Granulocytes Absolute, Automated 0.03 0.00 - 0.70 x10*3/uL    Lymphocytes Absolute 0.98 (L) 1.20 - 4.80 x10*3/uL    Monocytes Absolute 0.48 0.10 - 1.00 x10*3/uL    Eosinophils Absolute 0.17 0.00 - 0.70 x10*3/uL    Basophils Absolute 0.06 0.00 - 0.10 x10*3/uL   Comprehensive metabolic panel    Collection Time: 09/30/23  8:45 AM   Result Value Ref Range    Glucose 221 (H) 74 - 99 mg/dL    Sodium 132 (L) 136 - 145 mmol/L    Potassium 5.4 (H) 3.5 - 5.3 mmol/L    Chloride 99 98 - 107 mmol/L    Bicarbonate 22 21 - 32 mmol/L    Anion Gap 16 10 - 20 mmol/L    Urea Nitrogen 10 6 - 23 mg/dL    Creatinine 0.89 0.50 - 1.30 mg/dL    eGFR >90 >60 mL/min/1.73m*2    Calcium 9.4 8.6 - 10.3 mg/dL    Albumin 4.4 3.4 - 5.0 g/dL    Alkaline Phosphatase 86 33 - 136 U/L    Total Protein 7.9 6.4 - 8.2 g/dL    AST 32 9 - 39 U/L    Bilirubin, Total 1.0 0.0 - 1.2 mg/dL    ALT 19 10 - 52 U/L   Troponin I, High Sensitivity    Collection Time: 09/30/23  8:45 AM   Result Value Ref Range    Troponin I, High Sensitivity 135 (HH) 0 - 20 ng/L   Protime-INR    Collection Time: 09/30/23  8:45 AM   Result Value Ref Range    Protime 11.8 9.8 - 12.8 seconds    INR 1.0 0.9 - 1.1   APTT    Collection Time: 09/30/23  8:45 AM   Result Value Ref Range    aPTT 28 27 - 38 seconds   POCT glucose    Collection Time: 09/30/23  9:17 AM   Result Value Ref Range    POC Fingerstick Blood Glucose 152 (A) 70 - 100 mg/dl    POCT GLUCOSE    Collection Time: 09/30/23  9:18 AM   Result Value Ref Range    POCT Glucose 152 (H) 74 - 99 mg/dL   Troponin I, High Sensitivity    Collection Time: 09/30/23 12:35 PM   Result Value Ref Range    Troponin I, High Sensitivity 114 (HH) 0 - 20 ng/L   POCT GLUCOSE    Collection Time: 09/30/23  1:00 PM   Result Value Ref Range    POCT Glucose 104 (H) 74 - 99 mg/dL   Troponin I, High Sensitivity    Collection Time: 09/30/23  3:54 PM   Result Value Ref Range    Troponin I, High Sensitivity 246 (HH) 0 - 20 ng/L   Hemoglobin A1c    Collection Time: 09/30/23  3:54 PM   Result Value Ref Range    Hemoglobin A1C 5.6 see below %    Estimated Average Glucose 114 Not Established mg/dL   POCT GLUCOSE    Collection Time: 09/30/23  5:00 PM   Result Value Ref Range    POCT Glucose 74 74 - 99 mg/dL   POCT GLUCOSE    Collection Time: 09/30/23  9:51 PM   Result Value Ref Range    POCT Glucose 231 (H) 74 - 99 mg/dL   POCT GLUCOSE    Collection Time: 09/30/23 10:44 PM   Result Value Ref Range    POCT Glucose 228 (H) 74 - 99 mg/dL   POCT GLUCOSE    Collection Time: 09/30/23 11:52 PM   Result Value Ref Range    POCT Glucose 240 (H) 74 - 99 mg/dL   Comprehensive metabolic panel    Collection Time: 10/01/23  4:48 AM   Result Value Ref Range    Glucose 176 (H) 74 - 99 mg/dL    Sodium 134 (L) 136 - 145 mmol/L    Potassium 4.1 3.5 - 5.3 mmol/L    Chloride 102 98 - 107 mmol/L    Bicarbonate 25 21 - 32 mmol/L    Anion Gap 11 10 - 20 mmol/L    Urea Nitrogen 17 6 - 23 mg/dL    Creatinine 1.08 0.50 - 1.30 mg/dL    eGFR 77 >60 mL/min/1.73m*2    Calcium 8.8 8.6 - 10.3 mg/dL    Albumin 3.6 3.4 - 5.0 g/dL    Alkaline Phosphatase 71 33 - 136 U/L    Total Protein 6.5 6.4 - 8.2 g/dL    AST 22 9 - 39 U/L    Bilirubin, Total 0.8 0.0 - 1.2 mg/dL    ALT 14 10 - 52 U/L   CBC and Auto Differential    Collection Time: 10/01/23  4:48 AM   Result Value Ref Range    WBC 7.3 4.4 - 11.3 x10*3/uL    nRBC 0.0 0.0 - 0.0 /100 WBCs    RBC 3.00 (L) 4.50 - 5.90  x10*6/uL    Hemoglobin 8.1 (L) 13.5 - 17.5 g/dL    Hematocrit 25.7 (L) 41.0 - 52.0 %    MCV 86 80 - 100 fL    MCH 27.0 26.0 - 34.0 pg    MCHC 31.5 (L) 32.0 - 36.0 g/dL    RDW 17.4 (H) 11.5 - 14.5 %    Platelets 295 150 - 450 x10*3/uL    MPV 9.5 7.5 - 11.5 fL    Neutrophils % 67.8 40.0 - 80.0 %    Immature Granulocytes %, Automated 0.4 0.0 - 0.9 %    Lymphocytes % 18.5 13.0 - 44.0 %    Monocytes % 9.4 2.0 - 10.0 %    Eosinophils % 3.1 0.0 - 6.0 %    Basophils % 0.8 0.0 - 2.0 %    Neutrophils Absolute 4.97 1.20 - 7.70 x10*3/uL    Immature Granulocytes Absolute, Automated 0.03 0.00 - 0.70 x10*3/uL    Lymphocytes Absolute 1.36 1.20 - 4.80 x10*3/uL    Monocytes Absolute 0.69 0.10 - 1.00 x10*3/uL    Eosinophils Absolute 0.23 0.00 - 0.70 x10*3/uL    Basophils Absolute 0.06 0.00 - 0.10 x10*3/uL   POCT GLUCOSE    Collection Time: 10/01/23  8:13 AM   Result Value Ref Range    POCT Glucose 143 (H) 74 - 99 mg/dL       ECG  No results found for this or any previous visit (from the past 4464 hour(s)).    Echocardiogram  No results found for this or any previous visit from the past 1095 days.        Umesh Newby DO, FACC, FACOI  10/1/2023  9:50 AM

## 2023-10-01 NOTE — CARE PLAN
The patient's goals for the shift include      The clinical goals for the shift include Free from s/s of hypoglycemia    Over the shift, the patient did not make progress toward the following goals. Barriers to progression include lack of understanding. Recommendations to address these barriers include understanding diabetes education.

## 2023-10-01 NOTE — CONSULTS
"Inpatient consult to Endocrinology  Consult performed by: Enedelia Vieyra MD  Consult ordered by: Teresa Palmer MD          Reason For Consult  Type II DM with hypoglycemia        History Of Present Illness  Santana Mayo is a 63 y.o. male who presented on the account of CVA like symptoms.  The patient states that he has had a prior stroke and thus when he was \"feeling out of it\" he decided to call EMS.  He was found to be hypertensive.  Initial lab data revealed a glucose value of 44 mg/dL.  He also had troponin elevation.  CT scan of the  head revealed no acute intracranial process. He was given 1 amp D50 with glucose increased to 221.     The patient states that he has been diabetic for 10 years.  He reports his home medications to be:  Metformin 500mg twice daily   Glimepiride 2mg once daily   Atorvastatin nightly     He denies having any historic lows.  His lowest glucose value at home was 99 mg/dL.  He typically runs between 135 to 140 mg/dL.    He reports his last hemoglobin A1c to be 5.8% in August 2023 which was checked by his PCP.     Past Medical History  He has a past medical history of CVA (cerebral vascular accident) (CMS/HCC), DM (diabetes mellitus) (CMS/[facility]), HLD (hyperlipidemia), and HTN (hypertension).    Surgical History  He has a past surgical history that includes CT angio abdomen pelvis w and or wo IV IV contrast (1/30/2023).     Social History  He reports that he has quit smoking. His smoking use included cigarettes. He has never used smokeless tobacco. He reports current alcohol use of about 6.0 standard drinks of alcohol per week. He reports that he does not use drugs.    Family History  Brother - CAD s/p 2 stents placed two weeks ago      Allergies  Penicillins and Penicillin v    Review of Systems   Constitutional:  Negative for fatigue and unexpected weight change.   Eyes:  Negative for visual disturbance.   Respiratory:  Negative for shortness of breath.  " "  Cardiovascular:  Negative for chest pain and leg swelling.   Gastrointestinal:  Negative for nausea and vomiting.   Endocrine: Negative for polydipsia, polyphagia and polyuria.   Neurological:  Positive for weakness. Negative for tremors.        Physical Exam  Constitutional:       General: He is not in acute distress.     Appearance: Normal appearance. He is normal weight.   HENT:      Head: Normocephalic and atraumatic.      Nose: Nose normal.      Mouth/Throat:      Mouth: Mucous membranes are moist.   Eyes:      Extraocular Movements: Extraocular movements intact.   Cardiovascular:      Rate and Rhythm: Normal rate and regular rhythm.   Pulmonary:      Effort: Pulmonary effort is normal.      Breath sounds: Normal breath sounds.   Abdominal:      General: Bowel sounds are normal.      Palpations: Abdomen is soft.   Skin:     General: Skin is warm.   Neurological:      Mental Status: He is alert and oriented to person, place, and time.   Psychiatric:         Mood and Affect: Mood normal.          Last Recorded Vitals  Blood pressure 143/72, pulse 69, temperature 36.7 °C (98 °F), temperature source Temporal, resp. rate 18, height 1.803 m (5' 11\"), weight 90.4 kg (199 lb 4.7 oz), SpO2 94 %.    Relevant Results  Scheduled medications  aspirin, 81 mg, oral, Daily  heparin, 4,000 Units, intravenous, Once  pantoprazole, 40 mg, intravenous, Daily before breakfast  polyethylene glycol, 17 g, oral, Daily      Continuous medications  heparin, 0-4,000 Units/hr      PRN medications  PRN medications: acetaminophen **OR** acetaminophen **OR** acetaminophen, heparin, melatonin, ondansetron    Results for orders placed or performed during the hospital encounter of 09/30/23 (from the past 24 hour(s))   POCT GLUCOSE   Result Value Ref Range    POCT Glucose 104 (H) 74 - 99 mg/dL   Troponin I, High Sensitivity   Result Value Ref Range    Troponin I, High Sensitivity 246 (HH) 0 - 20 ng/L   Hemoglobin A1c   Result Value Ref Range    " Hemoglobin A1C 5.6 see below %    Estimated Average Glucose 114 Not Established mg/dL   POCT GLUCOSE   Result Value Ref Range    POCT Glucose 74 74 - 99 mg/dL   POCT GLUCOSE   Result Value Ref Range    POCT Glucose 231 (H) 74 - 99 mg/dL   POCT GLUCOSE   Result Value Ref Range    POCT Glucose 228 (H) 74 - 99 mg/dL   POCT GLUCOSE   Result Value Ref Range    POCT Glucose 240 (H) 74 - 99 mg/dL   Comprehensive metabolic panel   Result Value Ref Range    Glucose 176 (H) 74 - 99 mg/dL    Sodium 134 (L) 136 - 145 mmol/L    Potassium 4.1 3.5 - 5.3 mmol/L    Chloride 102 98 - 107 mmol/L    Bicarbonate 25 21 - 32 mmol/L    Anion Gap 11 10 - 20 mmol/L    Urea Nitrogen 17 6 - 23 mg/dL    Creatinine 1.08 0.50 - 1.30 mg/dL    eGFR 77 >60 mL/min/1.73m*2    Calcium 8.8 8.6 - 10.3 mg/dL    Albumin 3.6 3.4 - 5.0 g/dL    Alkaline Phosphatase 71 33 - 136 U/L    Total Protein 6.5 6.4 - 8.2 g/dL    AST 22 9 - 39 U/L    Bilirubin, Total 0.8 0.0 - 1.2 mg/dL    ALT 14 10 - 52 U/L   CBC and Auto Differential   Result Value Ref Range    WBC 7.3 4.4 - 11.3 x10*3/uL    nRBC 0.0 0.0 - 0.0 /100 WBCs    RBC 3.00 (L) 4.50 - 5.90 x10*6/uL    Hemoglobin 8.1 (L) 13.5 - 17.5 g/dL    Hematocrit 25.7 (L) 41.0 - 52.0 %    MCV 86 80 - 100 fL    MCH 27.0 26.0 - 34.0 pg    MCHC 31.5 (L) 32.0 - 36.0 g/dL    RDW 17.4 (H) 11.5 - 14.5 %    Platelets 295 150 - 450 x10*3/uL    MPV 9.5 7.5 - 11.5 fL    Neutrophils % 67.8 40.0 - 80.0 %    Immature Granulocytes %, Automated 0.4 0.0 - 0.9 %    Lymphocytes % 18.5 13.0 - 44.0 %    Monocytes % 9.4 2.0 - 10.0 %    Eosinophils % 3.1 0.0 - 6.0 %    Basophils % 0.8 0.0 - 2.0 %    Neutrophils Absolute 4.97 1.20 - 7.70 x10*3/uL    Immature Granulocytes Absolute, Automated 0.03 0.00 - 0.70 x10*3/uL    Lymphocytes Absolute 1.36 1.20 - 4.80 x10*3/uL    Monocytes Absolute 0.69 0.10 - 1.00 x10*3/uL    Eosinophils Absolute 0.23 0.00 - 0.70 x10*3/uL    Basophils Absolute 0.06 0.00 - 0.10 x10*3/uL   POCT GLUCOSE   Result Value Ref  Range    POCT Glucose 143 (H) 74 - 99 mg/dL   Heparin Assay, UFH   Result Value Ref Range    Heparin Unfractionated <0.1 See Comment Below for Therapeutic Ranges IU/mL   aPTT - baseline   Result Value Ref Range    aPTT 31 27 - 38 seconds   Platelet count - baseline   Result Value Ref Range    Platelets 268 150 - 450 x10*3/uL         Assessment/Plan   Medical Problems       Problem List       * (Principal) Troponin level elevated    Type II diabetes mellitus with hypoglycemia (CMS/[facility])    Stroke (CMS/[facility]) (Chronic)    HTN (hypertension) (Chronic)    HLD (hyperlipidemia) (Chronic)         Controlled type II DM with A1C of 5.6%  To commence insulin correction scale TID AC to start at 2 units   Diabetic diet as tolerated   Accu-Cheks ACHS    Hypoglycemic protocol   To hold Metformin for 48 hours post cardiac cath  To discontinue Glimepiride use given A1C and hypoglycemic event   Will continue to follow   Dyslipidemia  Reports taking Atorvastatin at home; this should be restarted given NSTEMI    Thank you for the courtesy of this consult

## 2023-10-01 NOTE — PROGRESS NOTES
Santana Mayo is a 63 y.o. male on day 1 of admission presenting with Troponin level elevated.    Subjective   Santana Mayo is a 63 y.o. male with PMH of prior stroke with residual left sided deficits, HLD, NIDDM II, who presents with concern for stroke.  States that he woke up this morning his vision was very blurred, unable to see anything effectively.  Presents life alert for EMS assistance.  Does report slurred speech upon EMS arrival.  Denies any headache, increased left-sided weakness from baseline, shortness of breath, chest pain, nausea, vomiting, diarrhea.  EMS checked his glucose levels, noted to be in the 60s and was given an amp of D50.  Patient denies any recent medication changes.  States he does not take any insulin.  States he has been taking his antidiabetic regimen as prescribed.  States his glucose levels have been well controlled recently.     ED course: HR 79, RR 20, /89, pulse ox 99% on room air.  No leukocytosis, Hgb 9.7.  Glucose 44 on arrival.  Chemistries remarkable for K5.4, troponin 135 -> 114.  CT head shows no acute intracranial process.  Given amp of D50 in ED with glucose increased to 200s.  Given hypoglycemia and elevated troponin decision made for admission.     Past Medical History  He has no past medical history on file.     Surgical History  He has a past surgical history that includes CT angio abdomen pelvis w and or wo IV IV contrast (1/30/2023).     Social History  Former smoker quit ~10 years ago.  Drinks few beers every couple of days, last drink 5 days ago     Family History  Family History   No family history on file.        Allergies  Penicillins and Penicillin v        10/1: Patient states he has not really ever had troubles with his sugars dropping.  It appears on reviewing his labs he has had some chronic anemia and his provider at primary care had asked him about dark or bloody stool.  The patient denies any bright red blood per stool but he is fairly anemic  and his hemoglobin has been dropping especially in the last 24 hours.  Hemoglobin had dropped from 9.7-8.1.  We will be stopping heparin drip at this time.  Cardiology is recommending possibly cardiac cath and endocrinology notes they think this would be a good idea.  I suspect the patient did have a fairly primary hypoglycemic episode of unknown etiology that may have helped precipitate his type II MI.  Although it is difficult to say.  The patient notes no acute issue the day before coming in that he is aware of.  He states he may not have eaten as well as he normally does not like before.  There may be more issues going on but his initial complaints of confusion and possible stroke were likely from hypoglycemia.  Do suspect he likely does have some significant coronary disease because of his ongoing diabetes etc.  We will continue to follow with endocrinology, cardiology and will also involve GI.    Objective     Physical Exam  HENT:      Head: Normocephalic.      Right Ear: External ear normal.      Left Ear: External ear normal.      Nose: Nose normal.      Mouth/Throat:      Mouth: Mucous membranes are moist.   Eyes:      Extraocular Movements: Extraocular movements intact.   Cardiovascular:      Rate and Rhythm: Normal rate and regular rhythm.      Heart sounds:      No friction rub. Gallop present.   Pulmonary:      Effort: Pulmonary effort is normal. No respiratory distress.   Abdominal:      General: Abdomen is flat. There is no distension.      Palpations: Abdomen is soft.      Tenderness: There is no abdominal tenderness.   Musculoskeletal:      Cervical back: Rigidity present.      Left lower leg: Edema present.   Skin:     General: Skin is warm.      Findings: Lesion present.      Comments: Left lower extremity edema that is mild    Patient does have a lesion on the left great toe underneath the callus suspect forming diabetic ulcer.   Neurological:      Mental Status: He is alert.      Comments:  "Stocking-glove neuropathy otherwise no focal neurologic deficits.   Psychiatric:         Mood and Affect: Mood normal.             Last Recorded Vitals  Blood pressure 151/75, pulse 77, temperature 37 °C (98.6 °F), temperature source Temporal, resp. rate 18, height 1.803 m (5' 11\"), weight 90.4 kg (199 lb 4.7 oz), SpO2 93 %.  Intake/Output last 3 Shifts:  No intake/output data recorded.    Relevant Results    Current Facility-Administered Medications:     acetaminophen (Tylenol) tablet 650 mg, 650 mg, oral, q4h PRN, 650 mg at 09/30/23 2052 **OR** acetaminophen (Tylenol) suspension 650 mg, 650 mg, nasogastric tube, q4h PRN **OR** acetaminophen (Tylenol) suppository 650 mg, 650 mg, rectal, q4h PRN, Jose R Frias PA-C    aspirin chewable tablet 81 mg, 81 mg, oral, Daily, Jose R Frias PA-C, 81 mg at 10/01/23 0911    dextrose 10 % infusion, 0.3 g/kg/hr, intravenous, Once PRN, Enedelia Vieyra MD    dextrose 50 % injection 25 g, 25 g, intravenous, q15 min PRN, Enedelia Vieyra MD    glucagon (Glucagen) injection 1 mg, 1 mg, intramuscular, q15 min PRN, Enedelia Vieyra MD    heparin (porcine) injection 2,000-4,000 Units, 2,000-4,000 Units, intravenous, q4h PRN, Umesh Newby DO    heparin 25,000 Units in dextrose 5% 250 mL (100 Units/mL) infusion (premix), 0-4,000 Units/hr, intravenous, Continuous, Umesh Newby DO, Last Rate: 12 mL/hr at 10/01/23 1303, 1,200 Units/hr at 10/01/23 1303    insulin lispro (HumaLOG) injection 0-10 Units, 0-10 Units, subcutaneous, TID with meals, Enedelia Vieyra MD, 4 Units at 10/01/23 1254    melatonin tablet 3 mg, 3 mg, oral, Nightly PRN, Jose R Frias PA-C, 3 mg at 09/30/23 2051    ondansetron (Zofran) injection 4 mg, 4 mg, intravenous, q6h PRN, Jose R Frias PA-C    [DISCONTINUED] pantoprazole (ProtoNix) EC tablet 40 mg, 40 mg, oral, Daily before breakfast **OR** pantoprazole (ProtoNix) injection 40 mg, 40 mg, intravenous, Daily " before breakfast, Jose R Frias PA-C, 40 mg at 10/01/23 0604    polyethylene glycol (Glycolax, Miralax) packet 17 g, 17 g, oral, Daily, Jose R Frias PA-C     Labs from the last few days have been reviewed.    Assessment/Plan   Principal Problem:    Troponin level elevated  Active Problems:    Type II diabetes mellitus with hypoglycemia (CMS/[facility])    Stroke (CMS/[facility])    HTN (hypertension)    HLD (hyperlipidemia)  Left great toe ulceration      Patient with acute hypoglycemic episode  Blood sugars improved  Currently covered with sliding scale insulin 3 times daily AC  Endocrinology consulted  Consider heart catheterization next 24 to 48 hours  Cardiology on consult  Stop heparin drip  Check iron and nutritional studies  GI consulted  Resume tramadol for low back pain in left toe pain  Check labs  See orders for complete plan             I spent 45 minutes in the professional and overall care of this patient.      Angel Panda MD

## 2023-10-01 NOTE — H&P (VIEW-ONLY)
[facility] V[facility] Cardiology Consult Note    Patient Name: Santana Mayo  Patient : 1960  Room/Bed: -A    Date: 10/01/23  Time: 9:50 AM    Referred by Dr. Unger ref. provider found for Blurred Vision     History Of Present Illness:    Santana Mayo is a 63 y.o. male who presented with blurred vision, shakiness, diaphoresis, mild shortness of breath yesterday morning around 6 AM.  He states that rather quickly he decided to call EMS as he was concerned about a recurrent stroke.  He otherwise denies any significant chest pain, palpitations, or lightheadedness.  He states that he is compliant with all of his medical therapy at home.  CBC shows a hemoglobin of 8.1.  BMP shows a serum sodium of 134, serum potassium 4.1, serum creatinine of 1.08.  Troponin was elevated at 135-114-246.  Hemoglobin A1c was 5.6%.  CT scan of the head showed no acute infarct or intracranial hemorrhage.  Echocardiogram done in 2023 showed ejection fraction 60%, normal right ventricular systolic function, mild aortic valve stenosis with a mean pressure gradient of 9.0 mmHg and a dimensionless index of 0.58.  During my exam the patient was resting comfortably in bed      Assessment/Plan:   1.  NSTEMI  Patient presented with atypical symptoms of shakiness, diaphoresis, mild shortness of breath as described in HPI.  Subsequently found to have elevated troponin of 135-114-246.  ECG showed sinus rhythm with a heart rate of 69 bpm no significant ST segment abnormality.  Echocardiogram done in 2023 showed ejection fraction 60%, normal right ventricular systolic function, mild aortic valve stenosis with a mean pressure gradient of 9.0 mmHg and a dimensionless index of 0.58.  We will check limited echocardiogram  Patient has been placed on aspirin therapy. I will place the patient on a heparin infusion.  I discussed possible cardiac catheterization with the patient which she stated he would need to consider  further.  We will keep n.p.o. for possible heart cath tomorrow.    2.  Hypertension  Patient has a history of hypertension which currently appears well controlled.  Patient is to be restarted on his home antihypertensive medical therapy    3.  Dyslipidemia  Patient should be restarted on statin therapy    4.  Diabetes mellitus  Management per hospitalist service    5.  Anemia  Hemoglobin of 8.1 this morning.  We will monitor with the addition of a heparin infusion.    6.  Aortic stenosis  Echocardiogram done in January 2023 showed ejection fraction 60%, normal right ventricular systolic function, mild aortic valve stenosis with a mean pressure gradient of 9.0 mmHg and a dimensionless index of 0.58.  Monitor clinically for now.      Past Medical History:  He has a past medical history of CVA (cerebral vascular accident) (CMS/[facility]), DM (diabetes mellitus) (CMS/[facility]), HLD (hyperlipidemia), and HTN (hypertension).    Past Surgical History:  He has a past surgical history that includes CT angio abdomen pelvis w and or wo IV IV contrast (1/30/2023).      Social History:  He reports that he has quit smoking. His smoking use included cigarettes. He has never used smokeless tobacco. He reports current alcohol use of about 6.0 standard drinks of alcohol per week. He reports that he does not use drugs.    Family History:  No family history on file.     Allergies:  Penicillins and Penicillin v    Outpatient Medications:  No current outpatient medications     ROS:  A 14 point review of systems was done and is negative other than as stated in HPI    Vitals:  Vitals:    09/30/23 1934 09/30/23 2300 10/01/23 0300 10/01/23 0813   BP: 138/74 107/63 114/66 143/72   BP Location: Right arm Right arm Right arm Right arm   Patient Position: Lying Lying Lying Lying   Pulse: 96 91 76 69   Resp: 18 18 18 18   Temp: 36.6 °C (97.8 °F) 36.2 °C (97.1 °F) 36.7 °C (98.1 °F) 36.7 °C (98 °F)   TempSrc: Temporal Temporal Temporal Temporal   SpO2: 94%  92% 93% 94%   Weight:   90.4 kg (199 lb 4.7 oz)    Height:           Physical Exam:     Constitutional: Cooperative, in no acute distress, alert, appears stated age.  Skin: Skin color, texture, turgor normal. No rashes or lesions.  Head: Normocephalic. No masses, lesions, tenderness or abnormalities  Eyes: Extraocular movements are grossly intact.  Mouth and throat: Mucous membranes moist  Neck: Neck supple, no carotid bruits, no JVD  Respiratory: Lungs clear to auscultation, no wheezing or rhonchi, no use of accessory muscles  Chest wall: No scars, normal excursion with respiration  Cardiovascular: Regular rhythm with + murmur  Gastrointestinal: Abdomen soft, nontender. Bowel sounds normal.  Musculoskeletal: Strength equal in upper extremities  Extremities: No pitting edema  Neurologic: Sensation grossly intact, alert and oriented ×3    Intake/Output:   No intake/output data recorded.    Outpatient Medications  No current facility-administered medications on file prior to encounter.     No current outpatient medications on file prior to encounter.       Scheduled medications  aspirin, 81 mg, oral, Daily  enoxaparin, 40 mg, subcutaneous, q24h  pantoprazole, 40 mg, intravenous, Daily before breakfast  polyethylene glycol, 17 g, oral, Daily      Continuous medications     PRN medications  PRN medications: acetaminophen **OR** acetaminophen **OR** acetaminophen, melatonin, ondansetron   No medications prior to admission.       Recent Labs: (past 2 days)  Recent Results (from the past 48 hour(s))   POCT glucose    Collection Time: 09/30/23  8:15 AM   Result Value Ref Range    POC Fingerstick Blood Glucose 44 (A) 70 - 100 mg/dl   POCT glucose    Collection Time: 09/30/23  8:40 AM   Result Value Ref Range    POC Fingerstick Blood Glucose 156 (A) 70 - 100 mg/dl   CBC and Auto Differential    Collection Time: 09/30/23  8:45 AM   Result Value Ref Range    WBC 7.0 4.4 - 11.3 x10*3/uL    nRBC 0.0 0.0 - 0.0 /100 WBCs    RBC  3.47 (L) 4.50 - 5.90 x10*6/uL    Hemoglobin 9.7 (L) 13.5 - 17.5 g/dL    Hematocrit 29.9 (L) 41.0 - 52.0 %    MCV 86 80 - 100 fL    MCH 28.0 26.0 - 34.0 pg    MCHC 32.4 32.0 - 36.0 g/dL    RDW 17.3 (H) 11.5 - 14.5 %    Platelets 324 150 - 450 x10*3/uL    MPV 9.0 7.5 - 11.5 fL    Neutrophils % 75.4 40.0 - 80.0 %    Immature Granulocytes %, Automated 0.4 0.0 - 0.9 %    Lymphocytes % 14.0 13.0 - 44.0 %    Monocytes % 6.9 2.0 - 10.0 %    Eosinophils % 2.4 0.0 - 6.0 %    Basophils % 0.9 0.0 - 2.0 %    Neutrophils Absolute 5.26 1.20 - 7.70 x10*3/uL    Immature Granulocytes Absolute, Automated 0.03 0.00 - 0.70 x10*3/uL    Lymphocytes Absolute 0.98 (L) 1.20 - 4.80 x10*3/uL    Monocytes Absolute 0.48 0.10 - 1.00 x10*3/uL    Eosinophils Absolute 0.17 0.00 - 0.70 x10*3/uL    Basophils Absolute 0.06 0.00 - 0.10 x10*3/uL   Comprehensive metabolic panel    Collection Time: 09/30/23  8:45 AM   Result Value Ref Range    Glucose 221 (H) 74 - 99 mg/dL    Sodium 132 (L) 136 - 145 mmol/L    Potassium 5.4 (H) 3.5 - 5.3 mmol/L    Chloride 99 98 - 107 mmol/L    Bicarbonate 22 21 - 32 mmol/L    Anion Gap 16 10 - 20 mmol/L    Urea Nitrogen 10 6 - 23 mg/dL    Creatinine 0.89 0.50 - 1.30 mg/dL    eGFR >90 >60 mL/min/1.73m*2    Calcium 9.4 8.6 - 10.3 mg/dL    Albumin 4.4 3.4 - 5.0 g/dL    Alkaline Phosphatase 86 33 - 136 U/L    Total Protein 7.9 6.4 - 8.2 g/dL    AST 32 9 - 39 U/L    Bilirubin, Total 1.0 0.0 - 1.2 mg/dL    ALT 19 10 - 52 U/L   Troponin I, High Sensitivity    Collection Time: 09/30/23  8:45 AM   Result Value Ref Range    Troponin I, High Sensitivity 135 (HH) 0 - 20 ng/L   Protime-INR    Collection Time: 09/30/23  8:45 AM   Result Value Ref Range    Protime 11.8 9.8 - 12.8 seconds    INR 1.0 0.9 - 1.1   APTT    Collection Time: 09/30/23  8:45 AM   Result Value Ref Range    aPTT 28 27 - 38 seconds   POCT glucose    Collection Time: 09/30/23  9:17 AM   Result Value Ref Range    POC Fingerstick Blood Glucose 152 (A) 70 - 100 mg/dl    POCT GLUCOSE    Collection Time: 09/30/23  9:18 AM   Result Value Ref Range    POCT Glucose 152 (H) 74 - 99 mg/dL   Troponin I, High Sensitivity    Collection Time: 09/30/23 12:35 PM   Result Value Ref Range    Troponin I, High Sensitivity 114 (HH) 0 - 20 ng/L   POCT GLUCOSE    Collection Time: 09/30/23  1:00 PM   Result Value Ref Range    POCT Glucose 104 (H) 74 - 99 mg/dL   Troponin I, High Sensitivity    Collection Time: 09/30/23  3:54 PM   Result Value Ref Range    Troponin I, High Sensitivity 246 (HH) 0 - 20 ng/L   Hemoglobin A1c    Collection Time: 09/30/23  3:54 PM   Result Value Ref Range    Hemoglobin A1C 5.6 see below %    Estimated Average Glucose 114 Not Established mg/dL   POCT GLUCOSE    Collection Time: 09/30/23  5:00 PM   Result Value Ref Range    POCT Glucose 74 74 - 99 mg/dL   POCT GLUCOSE    Collection Time: 09/30/23  9:51 PM   Result Value Ref Range    POCT Glucose 231 (H) 74 - 99 mg/dL   POCT GLUCOSE    Collection Time: 09/30/23 10:44 PM   Result Value Ref Range    POCT Glucose 228 (H) 74 - 99 mg/dL   POCT GLUCOSE    Collection Time: 09/30/23 11:52 PM   Result Value Ref Range    POCT Glucose 240 (H) 74 - 99 mg/dL   Comprehensive metabolic panel    Collection Time: 10/01/23  4:48 AM   Result Value Ref Range    Glucose 176 (H) 74 - 99 mg/dL    Sodium 134 (L) 136 - 145 mmol/L    Potassium 4.1 3.5 - 5.3 mmol/L    Chloride 102 98 - 107 mmol/L    Bicarbonate 25 21 - 32 mmol/L    Anion Gap 11 10 - 20 mmol/L    Urea Nitrogen 17 6 - 23 mg/dL    Creatinine 1.08 0.50 - 1.30 mg/dL    eGFR 77 >60 mL/min/1.73m*2    Calcium 8.8 8.6 - 10.3 mg/dL    Albumin 3.6 3.4 - 5.0 g/dL    Alkaline Phosphatase 71 33 - 136 U/L    Total Protein 6.5 6.4 - 8.2 g/dL    AST 22 9 - 39 U/L    Bilirubin, Total 0.8 0.0 - 1.2 mg/dL    ALT 14 10 - 52 U/L   CBC and Auto Differential    Collection Time: 10/01/23  4:48 AM   Result Value Ref Range    WBC 7.3 4.4 - 11.3 x10*3/uL    nRBC 0.0 0.0 - 0.0 /100 WBCs    RBC 3.00 (L) 4.50 - 5.90  x10*6/uL    Hemoglobin 8.1 (L) 13.5 - 17.5 g/dL    Hematocrit 25.7 (L) 41.0 - 52.0 %    MCV 86 80 - 100 fL    MCH 27.0 26.0 - 34.0 pg    MCHC 31.5 (L) 32.0 - 36.0 g/dL    RDW 17.4 (H) 11.5 - 14.5 %    Platelets 295 150 - 450 x10*3/uL    MPV 9.5 7.5 - 11.5 fL    Neutrophils % 67.8 40.0 - 80.0 %    Immature Granulocytes %, Automated 0.4 0.0 - 0.9 %    Lymphocytes % 18.5 13.0 - 44.0 %    Monocytes % 9.4 2.0 - 10.0 %    Eosinophils % 3.1 0.0 - 6.0 %    Basophils % 0.8 0.0 - 2.0 %    Neutrophils Absolute 4.97 1.20 - 7.70 x10*3/uL    Immature Granulocytes Absolute, Automated 0.03 0.00 - 0.70 x10*3/uL    Lymphocytes Absolute 1.36 1.20 - 4.80 x10*3/uL    Monocytes Absolute 0.69 0.10 - 1.00 x10*3/uL    Eosinophils Absolute 0.23 0.00 - 0.70 x10*3/uL    Basophils Absolute 0.06 0.00 - 0.10 x10*3/uL   POCT GLUCOSE    Collection Time: 10/01/23  8:13 AM   Result Value Ref Range    POCT Glucose 143 (H) 74 - 99 mg/dL       ECG  No results found for this or any previous visit (from the past 4464 hour(s)).    Echocardiogram  No results found for this or any previous visit from the past 1095 days.        Umesh Newby DO, FACC, FACOI  10/1/2023  9:50 AM

## 2023-10-02 ENCOUNTER — APPOINTMENT (OUTPATIENT)
Dept: CARDIOLOGY | Facility: HOSPITAL | Age: 63
DRG: 264 | End: 2023-10-02
Payer: COMMERCIAL

## 2023-10-02 PROBLEM — E53.8 B12 DEFICIENCY: Status: ACTIVE | Noted: 2023-10-02

## 2023-10-02 PROBLEM — E61.1 IRON DEFICIENCY: Status: ACTIVE | Noted: 2023-10-02

## 2023-10-02 PROBLEM — I21.4 NSTEMI (NON-ST ELEVATED MYOCARDIAL INFARCTION) (MULTI): Status: ACTIVE | Noted: 2023-09-30

## 2023-10-02 LAB
ANION GAP SERPL CALC-SCNC: 10 MMOL/L (ref 10–20)
BASOPHILS # BLD AUTO: 0.05 X10*3/UL (ref 0–0.1)
BASOPHILS NFR BLD AUTO: 0.6 %
BUN SERPL-MCNC: 13 MG/DL (ref 6–23)
CALCIUM SERPL-MCNC: 8.9 MG/DL (ref 8.6–10.3)
CHLORIDE SERPL-SCNC: 105 MMOL/L (ref 98–107)
CO2 SERPL-SCNC: 25 MMOL/L (ref 21–32)
CREAT SERPL-MCNC: 0.83 MG/DL (ref 0.5–1.3)
EOSINOPHIL # BLD AUTO: 0.39 X10*3/UL (ref 0–0.7)
EOSINOPHIL NFR BLD AUTO: 5 %
ERYTHROCYTE [DISTWIDTH] IN BLOOD BY AUTOMATED COUNT: 17.6 % (ref 11.5–14.5)
GFR SERPL CREATININE-BSD FRML MDRD: >90 ML/MIN/1.73M*2
GLUCOSE BLD MANUAL STRIP-MCNC: 125 MG/DL (ref 74–99)
GLUCOSE BLD MANUAL STRIP-MCNC: 132 MG/DL (ref 74–99)
GLUCOSE BLD MANUAL STRIP-MCNC: 134 MG/DL (ref 74–99)
GLUCOSE BLD MANUAL STRIP-MCNC: 146 MG/DL (ref 74–99)
GLUCOSE SERPL-MCNC: 112 MG/DL (ref 74–99)
HCT VFR BLD AUTO: 26.8 % (ref 41–52)
HGB BLD-MCNC: 8.6 G/DL (ref 13.5–17.5)
IMM GRANULOCYTES # BLD AUTO: 0.03 X10*3/UL (ref 0–0.7)
IMM GRANULOCYTES NFR BLD AUTO: 0.4 % (ref 0–0.9)
IRON SATN MFR SERPL: 10 % (ref 25–45)
IRON SERPL-MCNC: 41 UG/DL (ref 35–150)
LYMPHOCYTES # BLD AUTO: 1.76 X10*3/UL (ref 1.2–4.8)
LYMPHOCYTES NFR BLD AUTO: 22.6 %
MAGNESIUM SERPL-MCNC: 1.76 MG/DL (ref 1.6–2.4)
MCH RBC QN AUTO: 28.2 PG (ref 26–34)
MCHC RBC AUTO-ENTMCNC: 32.1 G/DL (ref 32–36)
MCV RBC AUTO: 88 FL (ref 80–100)
MONOCYTES # BLD AUTO: 0.67 X10*3/UL (ref 0.1–1)
MONOCYTES NFR BLD AUTO: 8.6 %
NEUTROPHILS # BLD AUTO: 4.9 X10*3/UL (ref 1.2–7.7)
NEUTROPHILS NFR BLD AUTO: 62.8 %
NRBC BLD-RTO: 0 /100 WBCS (ref 0–0)
PLATELET # BLD AUTO: 283 X10*3/UL (ref 150–450)
PLATELET # BLD AUTO: 283 X10*3/UL (ref 150–450)
PMV BLD AUTO: 9.4 FL (ref 7.5–11.5)
POTASSIUM SERPL-SCNC: 3.8 MMOL/L (ref 3.5–5.3)
RBC # BLD AUTO: 3.05 X10*6/UL (ref 4.5–5.9)
SODIUM SERPL-SCNC: 136 MMOL/L (ref 136–145)
TIBC SERPL-MCNC: 416 UG/DL (ref 240–445)
UIBC SERPL-MCNC: 375 UG/DL (ref 110–370)
VIT B12 SERPL-MCNC: 286 PG/ML (ref 211–911)
WBC # BLD AUTO: 7.8 X10*3/UL (ref 4.4–11.3)

## 2023-10-02 PROCEDURE — 93458 L HRT ARTERY/VENTRICLE ANGIO: CPT | Performed by: STUDENT IN AN ORGANIZED HEALTH CARE EDUCATION/TRAINING PROGRAM

## 2023-10-02 PROCEDURE — 85049 AUTOMATED PLATELET COUNT: CPT | Performed by: INTERNAL MEDICINE

## 2023-10-02 PROCEDURE — 80048 BASIC METABOLIC PNL TOTAL CA: CPT | Performed by: INTERNAL MEDICINE

## 2023-10-02 PROCEDURE — 2500000004 HC RX 250 GENERAL PHARMACY W/ HCPCS (ALT 636 FOR OP/ED): Performed by: PHYSICIAN ASSISTANT

## 2023-10-02 PROCEDURE — 2500000001 HC RX 250 WO HCPCS SELF ADMINISTERED DRUGS (ALT 637 FOR MEDICARE OP): Performed by: STUDENT IN AN ORGANIZED HEALTH CARE EDUCATION/TRAINING PROGRAM

## 2023-10-02 PROCEDURE — 4A023N7 MEASUREMENT OF CARDIAC SAMPLING AND PRESSURE, LEFT HEART, PERCUTANEOUS APPROACH: ICD-10-PCS | Performed by: STUDENT IN AN ORGANIZED HEALTH CARE EDUCATION/TRAINING PROGRAM

## 2023-10-02 PROCEDURE — C1894 INTRO/SHEATH, NON-LASER: HCPCS | Performed by: STUDENT IN AN ORGANIZED HEALTH CARE EDUCATION/TRAINING PROGRAM

## 2023-10-02 PROCEDURE — 2720000007 HC OR 272 NO HCPCS: Performed by: STUDENT IN AN ORGANIZED HEALTH CARE EDUCATION/TRAINING PROGRAM

## 2023-10-02 PROCEDURE — 83735 ASSAY OF MAGNESIUM: CPT | Performed by: INTERNAL MEDICINE

## 2023-10-02 PROCEDURE — 99152 MOD SED SAME PHYS/QHP 5/>YRS: CPT | Performed by: STUDENT IN AN ORGANIZED HEALTH CARE EDUCATION/TRAINING PROGRAM

## 2023-10-02 PROCEDURE — 2780000003 HC OR 278 NO HCPCS: Performed by: STUDENT IN AN ORGANIZED HEALTH CARE EDUCATION/TRAINING PROGRAM

## 2023-10-02 PROCEDURE — 83540 ASSAY OF IRON: CPT | Performed by: INTERNAL MEDICINE

## 2023-10-02 PROCEDURE — C9113 INJ PANTOPRAZOLE SODIUM, VIA: HCPCS | Performed by: PHYSICIAN ASSISTANT

## 2023-10-02 PROCEDURE — 2550000001 HC RX 255 CONTRASTS: Performed by: STUDENT IN AN ORGANIZED HEALTH CARE EDUCATION/TRAINING PROGRAM

## 2023-10-02 PROCEDURE — 99254 IP/OBS CNSLTJ NEW/EST MOD 60: CPT | Performed by: INTERNAL MEDICINE

## 2023-10-02 PROCEDURE — 2500000004 HC RX 250 GENERAL PHARMACY W/ HCPCS (ALT 636 FOR OP/ED): Performed by: STUDENT IN AN ORGANIZED HEALTH CARE EDUCATION/TRAINING PROGRAM

## 2023-10-02 PROCEDURE — 2500000001 HC RX 250 WO HCPCS SELF ADMINISTERED DRUGS (ALT 637 FOR MEDICARE OP): Performed by: PHYSICIAN ASSISTANT

## 2023-10-02 PROCEDURE — 36415 COLL VENOUS BLD VENIPUNCTURE: CPT | Performed by: INTERNAL MEDICINE

## 2023-10-02 PROCEDURE — 99232 SBSQ HOSP IP/OBS MODERATE 35: CPT | Performed by: INTERNAL MEDICINE

## 2023-10-02 PROCEDURE — B2111ZZ FLUOROSCOPY OF MULTIPLE CORONARY ARTERIES USING LOW OSMOLAR CONTRAST: ICD-10-PCS | Performed by: STUDENT IN AN ORGANIZED HEALTH CARE EDUCATION/TRAINING PROGRAM

## 2023-10-02 PROCEDURE — 2500000001 HC RX 250 WO HCPCS SELF ADMINISTERED DRUGS (ALT 637 FOR MEDICARE OP): Performed by: INTERNAL MEDICINE

## 2023-10-02 PROCEDURE — 85025 COMPLETE CBC W/AUTO DIFF WBC: CPT | Performed by: INTERNAL MEDICINE

## 2023-10-02 PROCEDURE — 2500000004 HC RX 250 GENERAL PHARMACY W/ HCPCS (ALT 636 FOR OP/ED): Performed by: INTERNAL MEDICINE

## 2023-10-02 PROCEDURE — 82947 ASSAY GLUCOSE BLOOD QUANT: CPT

## 2023-10-02 PROCEDURE — 99153 MOD SED SAME PHYS/QHP EA: CPT | Performed by: STUDENT IN AN ORGANIZED HEALTH CARE EDUCATION/TRAINING PROGRAM

## 2023-10-02 PROCEDURE — 82607 VITAMIN B-12: CPT | Performed by: INTERNAL MEDICINE

## 2023-10-02 PROCEDURE — 2060000001 HC INTERMEDIATE ICU ROOM DAILY

## 2023-10-02 PROCEDURE — C1760 CLOSURE DEV, VASC: HCPCS | Performed by: STUDENT IN AN ORGANIZED HEALTH CARE EDUCATION/TRAINING PROGRAM

## 2023-10-02 PROCEDURE — 2500000005 HC RX 250 GENERAL PHARMACY W/O HCPCS: Performed by: STUDENT IN AN ORGANIZED HEALTH CARE EDUCATION/TRAINING PROGRAM

## 2023-10-02 PROCEDURE — 99233 SBSQ HOSP IP/OBS HIGH 50: CPT | Performed by: INTERNAL MEDICINE

## 2023-10-02 RX ORDER — CARVEDILOL 3.12 MG/1
3.12 TABLET ORAL
Status: DISCONTINUED | OUTPATIENT
Start: 2023-10-02 | End: 2023-10-04 | Stop reason: HOSPADM

## 2023-10-02 RX ORDER — FENTANYL CITRATE 50 UG/ML
INJECTION, SOLUTION INTRAMUSCULAR; INTRAVENOUS AS NEEDED
Status: DISCONTINUED | OUTPATIENT
Start: 2023-10-02 | End: 2023-10-02 | Stop reason: HOSPADM

## 2023-10-02 RX ORDER — FERROUS SULFATE 325(65) MG
65 TABLET ORAL
Status: DISCONTINUED | OUTPATIENT
Start: 2023-10-03 | End: 2023-10-02

## 2023-10-02 RX ORDER — LIDOCAINE 560 MG/1
1 PATCH PERCUTANEOUS; TOPICAL; TRANSDERMAL DAILY
Status: DISCONTINUED | OUTPATIENT
Start: 2023-10-03 | End: 2023-10-04 | Stop reason: HOSPADM

## 2023-10-02 RX ORDER — FERROUS SULFATE 325(65) MG
65 TABLET ORAL
Status: DISCONTINUED | OUTPATIENT
Start: 2023-10-03 | End: 2023-10-04 | Stop reason: HOSPADM

## 2023-10-02 RX ORDER — ATORVASTATIN CALCIUM 40 MG/1
40 TABLET, FILM COATED ORAL DAILY
Status: DISCONTINUED | OUTPATIENT
Start: 2023-10-02 | End: 2023-10-03

## 2023-10-02 RX ORDER — MIDAZOLAM HYDROCHLORIDE 1 MG/ML
INJECTION INTRAMUSCULAR; INTRAVENOUS AS NEEDED
Status: DISCONTINUED | OUTPATIENT
Start: 2023-10-02 | End: 2023-10-02 | Stop reason: HOSPADM

## 2023-10-02 RX ORDER — HEPARIN SODIUM 1000 [USP'U]/ML
INJECTION, SOLUTION INTRAVENOUS; SUBCUTANEOUS AS NEEDED
Status: DISCONTINUED | OUTPATIENT
Start: 2023-10-02 | End: 2023-10-02 | Stop reason: HOSPADM

## 2023-10-02 RX ORDER — OXYCODONE HYDROCHLORIDE 5 MG/1
5 TABLET ORAL EVERY 6 HOURS PRN
Status: DISCONTINUED | OUTPATIENT
Start: 2023-10-02 | End: 2023-10-04 | Stop reason: HOSPADM

## 2023-10-02 RX ORDER — LANOLIN ALCOHOL/MO/W.PET/CERES
1000 CREAM (GRAM) TOPICAL DAILY
Status: DISCONTINUED | OUTPATIENT
Start: 2023-10-02 | End: 2023-10-04 | Stop reason: HOSPADM

## 2023-10-02 RX ORDER — LIDOCAINE HYDROCHLORIDE 20 MG/ML
INJECTION, SOLUTION INFILTRATION; PERINEURAL AS NEEDED
Status: DISCONTINUED | OUTPATIENT
Start: 2023-10-02 | End: 2023-10-02 | Stop reason: HOSPADM

## 2023-10-02 RX ORDER — OXYMETAZOLINE HCL 0.05 %
2 SPRAY, NON-AEROSOL (ML) NASAL 2 TIMES DAILY
Status: DISCONTINUED | OUTPATIENT
Start: 2023-10-02 | End: 2023-10-04 | Stop reason: HOSPADM

## 2023-10-02 RX ADMIN — ATORVASTATIN CALCIUM 40 MG: 40 TABLET, FILM COATED ORAL at 09:31

## 2023-10-02 RX ADMIN — IRON SUCROSE 200 MG: 20 INJECTION, SOLUTION INTRAVENOUS at 21:04

## 2023-10-02 RX ADMIN — TRAMADOL HYDROCHLORIDE 50 MG: 50 TABLET, COATED ORAL at 09:32

## 2023-10-02 RX ADMIN — ASPIRIN 81 MG CHEWABLE TABLET 81 MG: 81 TABLET CHEWABLE at 09:31

## 2023-10-02 RX ADMIN — CARVEDILOL 3.12 MG: 3.12 TABLET, FILM COATED ORAL at 09:32

## 2023-10-02 RX ADMIN — CARVEDILOL 3.12 MG: 3.12 TABLET, FILM COATED ORAL at 16:28

## 2023-10-02 RX ADMIN — FOLIC ACID 1 MG: 1 TABLET ORAL at 09:32

## 2023-10-02 RX ADMIN — ACETAMINOPHEN 650 MG: 325 TABLET, FILM COATED ORAL at 21:04

## 2023-10-02 RX ADMIN — PANTOPRAZOLE SODIUM 40 MG: 40 INJECTION, POWDER, FOR SOLUTION INTRAVENOUS at 09:31

## 2023-10-02 RX ADMIN — CYANOCOBALAMIN TAB 1000 MCG 1000 MCG: 1000 TAB at 14:20

## 2023-10-02 RX ADMIN — HUMAN ALBUMIN MICROSPHERES AND PERFLUTREN 0.5 ML: 10; .22 INJECTION, SOLUTION INTRAVENOUS at 12:41

## 2023-10-02 RX ADMIN — OXYCODONE HYDROCHLORIDE 5 MG: 5 TABLET ORAL at 23:52

## 2023-10-02 RX ADMIN — Medication 3 MG: at 21:04

## 2023-10-02 RX ADMIN — TRAMADOL HYDROCHLORIDE 50 MG: 50 TABLET, COATED ORAL at 21:04

## 2023-10-02 SDOH — ECONOMIC STABILITY: TRANSPORTATION INSECURITY
IN THE PAST 12 MONTHS, HAS THE LACK OF TRANSPORTATION KEPT YOU FROM MEDICAL APPOINTMENTS OR FROM GETTING MEDICATIONS?: NO

## 2023-10-02 SDOH — ECONOMIC STABILITY: FOOD INSECURITY: WITHIN THE PAST 12 MONTHS, THE FOOD YOU BOUGHT JUST DIDN'T LAST AND YOU DIDN'T HAVE MONEY TO GET MORE.: NEVER TRUE

## 2023-10-02 SDOH — ECONOMIC STABILITY: INCOME INSECURITY: IN THE PAST 12 MONTHS, HAS THE ELECTRIC, GAS, OIL, OR WATER COMPANY THREATENED TO SHUT OFF SERVICE IN YOUR HOME?: NO

## 2023-10-02 SDOH — ECONOMIC STABILITY: FOOD INSECURITY: WITHIN THE PAST 12 MONTHS, YOU WORRIED THAT YOUR FOOD WOULD RUN OUT BEFORE YOU GOT MONEY TO BUY MORE.: NEVER TRUE

## 2023-10-02 SDOH — ECONOMIC STABILITY: TRANSPORTATION INSECURITY
IN THE PAST 12 MONTHS, HAS LACK OF TRANSPORTATION KEPT YOU FROM MEETINGS, WORK, OR FROM GETTING THINGS NEEDED FOR DAILY LIVING?: NO

## 2023-10-02 SDOH — ECONOMIC STABILITY: INCOME INSECURITY: HOW HARD IS IT FOR YOU TO PAY FOR THE VERY BASICS LIKE FOOD, HOUSING, MEDICAL CARE, AND HEATING?: NOT HARD AT ALL

## 2023-10-02 SDOH — ECONOMIC STABILITY: INCOME INSECURITY: IN THE LAST 12 MONTHS, WAS THERE A TIME WHEN YOU WERE NOT ABLE TO PAY THE MORTGAGE OR RENT ON TIME?: NO

## 2023-10-02 ASSESSMENT — COGNITIVE AND FUNCTIONAL STATUS - GENERAL
MOVING TO AND FROM BED TO CHAIR: A LOT
TOILETING: A LOT
CLIMB 3 TO 5 STEPS WITH RAILING: TOTAL
STANDING UP FROM CHAIR USING ARMS: A LOT
MOBILITY SCORE: 11
EATING MEALS: A LITTLE
TURNING FROM BACK TO SIDE WHILE IN FLAT BAD: A LOT
MOVING FROM LYING ON BACK TO SITTING ON SIDE OF FLAT BED WITH BEDRAILS: A LITTLE
PERSONAL GROOMING: A LOT
HELP NEEDED FOR BATHING: A LOT
WALKING IN HOSPITAL ROOM: TOTAL
DRESSING REGULAR UPPER BODY CLOTHING: A LOT
DRESSING REGULAR LOWER BODY CLOTHING: A LOT
DAILY ACTIVITIY SCORE: 13

## 2023-10-02 ASSESSMENT — PAIN SCALES - GENERAL
PAINLEVEL_OUTOF10: 3
PAINLEVEL_OUTOF10: 8
PAINLEVEL_OUTOF10: 0 - NO PAIN
PAINLEVEL_OUTOF10: 5 - MODERATE PAIN
PAINLEVEL_OUTOF10: 0 - NO PAIN
PAINLEVEL_OUTOF10: 0 - NO PAIN
PAINLEVEL_OUTOF10: 5 - MODERATE PAIN
PAINLEVEL_OUTOF10: 0 - NO PAIN
PAINLEVEL_OUTOF10: 0 - NO PAIN
PAINLEVEL_OUTOF10: 8
PAINLEVEL_OUTOF10: 6
PAINLEVEL_OUTOF10: 0 - NO PAIN
PAINLEVEL_OUTOF10: 8
PAINLEVEL_OUTOF10: 8

## 2023-10-02 ASSESSMENT — PAIN - FUNCTIONAL ASSESSMENT
PAIN_FUNCTIONAL_ASSESSMENT: 0-10

## 2023-10-02 ASSESSMENT — ENCOUNTER SYMPTOMS
EYE PAIN: 0
NAUSEA: 0
ABDOMINAL PAIN: 0
CHILLS: 0
FEVER: 0
EYE DISCHARGE: 0
WEAKNESS: 1
NERVOUS/ANXIOUS: 0
FACIAL SWELLING: 0
SHORTNESS OF BREATH: 0
WHEEZING: 0
VOMITING: 0
WOUND: 1

## 2023-10-02 NOTE — PROGRESS NOTES
"Santana Mayo is a 63 y.o. male on day 2 of admission presenting with Troponin level elevated.    Subjective   Glycemic control has been reviewed.   Patient is for cardiac cath.    I have reviewed histories, allergies and medications have been reviewed and there are no changes       Objective     Physical Exam  Vitals and nursing note reviewed.   Constitutional:       General: He is not in acute distress.     Appearance: Normal appearance. He is normal weight.   HENT:      Head: Normocephalic and atraumatic.   Pulmonary:      Effort: Pulmonary effort is normal.   Neurological:      Mental Status: He is alert.   Psychiatric:         Mood and Affect: Mood normal.         Last Recorded Vitals  Blood pressure 180/74, pulse 60, temperature 36.7 °C (98.1 °F), temperature source Temporal, resp. rate 16, height 1.803 m (5' 11\"), weight 90.4 kg (199 lb 4.7 oz), SpO2 94 %.  Intake/Output last 3 Shifts:  I/O last 3 completed shifts:  In: 627.6 (6.9 mL/kg) [P.O.:600; I.V.:27.6 (0.3 mL/kg)]  Out: 525 (5.8 mL/kg) [Urine:525 (0.2 mL/kg/hr)]  Weight: 90.4 kg     Relevant Results  Results from last 7 days   Lab Units 10/02/23  1512 10/02/23  1103 10/02/23  0836 10/02/23  0446 10/01/23  1948 10/01/23  1613 10/01/23  0813 10/01/23  0448 09/30/23  0917 09/30/23  0845   POCT GLUCOSE mg/dL 132* 146* 134*  --  199* 210*   < >  --    < >  --    POC FINGERSTICK BLOOD GLUCOSE ME   --   --   --   --   --   --   --   --    < >  --    GLUCOSE mg/dL  --   --   --  112*  --   --   --  176*  --  221*    < > = values in this interval not displayed.     Scheduled medications  aspirin, 81 mg, oral, Daily  atorvastatin, 40 mg, oral, Daily  carvedilol, 3.125 mg, oral, BID with meals  cyanocobalamin, 1,000 mcg, oral, Daily  [START ON 10/3/2023] ferrous sulfate, 65 mg of iron, oral, Daily with breakfast  folic acid, 1 mg, oral, Daily  insulin lispro, 0-10 Units, subcutaneous, TID with meals  iron sucrose (Venofer) 200 mg in sodium chloride 0.9% 100 mL " IVPB, 200 mg, intravenous, q24h  oxymetazoline, 2 spray, Each Nostril, BID  pantoprazole, 40 mg, intravenous, Daily before breakfast  polyethylene glycol, 17 g, oral, Daily  traMADol, 50 mg, oral, BID      Continuous medications  [Held by provider] heparin, 0-4,000 Units/hr, Last Rate: 1,200 Units/hr (10/02/23 0251)      PRN medications  PRN medications: acetaminophen, acetaminophen **OR** [DISCONTINUED] acetaminophen **OR** acetaminophen, dextrose, dextrose, fentaNYL PF, glucagon, [Held by provider] heparin, heparin, iohexol, lidocaine, melatonin, midazolam, ondansetron       Results for orders placed or performed during the hospital encounter of 09/30/23 (from the past 24 hour(s))   POCT GLUCOSE   Result Value Ref Range    POCT Glucose 199 (H) 74 - 99 mg/dL   Platelet count - HIT surveillance   Result Value Ref Range    Platelets 283 150 - 450 x10*3/uL   Magnesium   Result Value Ref Range    Magnesium 1.76 1.60 - 2.40 mg/dL   Basic Metabolic Panel   Result Value Ref Range    Glucose 112 (H) 74 - 99 mg/dL    Sodium 136 136 - 145 mmol/L    Potassium 3.8 3.5 - 5.3 mmol/L    Chloride 105 98 - 107 mmol/L    Bicarbonate 25 21 - 32 mmol/L    Anion Gap 10 10 - 20 mmol/L    Urea Nitrogen 13 6 - 23 mg/dL    Creatinine 0.83 0.50 - 1.30 mg/dL    eGFR >90 >60 mL/min/1.73m*2    Calcium 8.9 8.6 - 10.3 mg/dL   CBC and Auto Differential   Result Value Ref Range    WBC 7.8 4.4 - 11.3 x10*3/uL    nRBC 0.0 0.0 - 0.0 /100 WBCs    RBC 3.05 (L) 4.50 - 5.90 x10*6/uL    Hemoglobin 8.6 (L) 13.5 - 17.5 g/dL    Hematocrit 26.8 (L) 41.0 - 52.0 %    MCV 88 80 - 100 fL    MCH 28.2 26.0 - 34.0 pg    MCHC 32.1 32.0 - 36.0 g/dL    RDW 17.6 (H) 11.5 - 14.5 %    Platelets 283 150 - 450 x10*3/uL    MPV 9.4 7.5 - 11.5 fL    Neutrophils % 62.8 40.0 - 80.0 %    Immature Granulocytes %, Automated 0.4 0.0 - 0.9 %    Lymphocytes % 22.6 13.0 - 44.0 %    Monocytes % 8.6 2.0 - 10.0 %    Eosinophils % 5.0 0.0 - 6.0 %    Basophils % 0.6 0.0 - 2.0 %     Neutrophils Absolute 4.90 1.20 - 7.70 x10*3/uL    Immature Granulocytes Absolute, Automated 0.03 0.00 - 0.70 x10*3/uL    Lymphocytes Absolute 1.76 1.20 - 4.80 x10*3/uL    Monocytes Absolute 0.67 0.10 - 1.00 x10*3/uL    Eosinophils Absolute 0.39 0.00 - 0.70 x10*3/uL    Basophils Absolute 0.05 0.00 - 0.10 x10*3/uL   Iron and TIBC   Result Value Ref Range    Iron 41 35 - 150 ug/dL    UIBC 375 (H) 110 - 370 ug/dL    TIBC 416 240 - 445 ug/dL    % Saturation 10 (L) 25 - 45 %   Vitamin B12   Result Value Ref Range    Vitamin B12 286 211 - 911 pg/mL   POCT GLUCOSE   Result Value Ref Range    POCT Glucose 134 (H) 74 - 99 mg/dL   POCT GLUCOSE   Result Value Ref Range    POCT Glucose 146 (H) 74 - 99 mg/dL   POCT GLUCOSE   Result Value Ref Range    POCT Glucose 132 (H) 74 - 99 mg/dL      CT brain attack head wo IV contrast    Result Date: 10/2/2023  Interpreted By:  Schoenberger, Joseph, STUDY: CT BRAIN ATTACK HEAD WO IV CONTRAST;  9/30/2023 8:29 am   INDICATION: Signs/Symptoms:stroke alert.   COMPARISON: None.   ACCESSION NUMBER(S): [id number]   ORDERING CLINICIAN: RUDOLPH SCHULTZ   TECHNIQUE: Noncontrast axial CT scan of head was performed. Angled reformats in brain and bone windows were generated. The images were reviewed in bone, brain, blood and soft tissue windows.   FINDINGS: CSF Spaces: Enlarged due to parenchymal volume loss. Normal configuration intact basal cisterns. There is no extraaxial fluid collection.   Parenchyma: Periventricular deep white matter hypoattenuation consistent with small vessel ischemic white matter demyelination. Remote infarct extending from the posterior right frontal deep white matter into the right basal ganglia. Stroke alert the grey-white differentiation is intact. There is no mass effect or midline shift. There is no intracranial hemorrhage.   Calvarium: The calvarium is unremarkable.   Paranasal sinuses and mastoids: Visualized paranasal sinuses and mastoids are clear.       No  evidence of acute cortical infarct or intracranial hemorrhage.   Atrophy and chronic small vessel ischemic white matter demyelination. Remote deep white matter/basal ganglia infarct right hemisphere.   MACRO: .  No definite acute finding. Atrophy and chronic ischemic white matter demyelination. Joseph Schoenberger discussed the significance and urgency of this critical finding by telephone with  RUDOLPH SCHULTZ on 9/30/2023 at 8:38 am.  (**-RCF-**) Findings:  See findings.     Signed by: Joseph Schoenberger 9/30/2023 8:41 AM Dictation workstation:   [id number]      Assessment/Plan   Principal Problem:    Troponin level elevated  Active Problems:    Type II diabetes mellitus with hypoglycemia (CMS/HCC)    Stroke (CMS/HCC)    HTN (hypertension)    HLD (hyperlipidemia)    B12 deficiency    Iron deficiency    NSTEMI (non-ST elevated myocardial infarction) (CMS/[facility])    Controlled type II DM with A1C of 5.6%  To continue insulin correction scale TID AC to start at 2 units   Diabetic diet once back from procedure   Accu-Cheks ACHS    Hypoglycemic protocol   To hold Metformin for 48 hours post cardiac cath  To discontinue Glimepiride use given A1C and hypoglycemic event   Will continue to follow        Enedelia Vieyra MD

## 2023-10-02 NOTE — PROGRESS NOTES
[facility] Heart and Vascular Cardiology    Patient Name: Santana Mayo  Patient : 1960  Room/Bed: -A    Date: 10/02/23  Time: 8:54 AM    Subjective:    Patient reports feeling well this morning without any new cardiac problems or complaints.  Patient is planned to undergo left heart catheterization today.    Assessment/Plan:   1.  NSTEMI  Patient presented with atypical symptoms of shakiness, diaphoresis, mild shortness of breath as described in HPI.  Subsequently found to have elevated troponin of 135-114-246.  ECG showed sinus rhythm with a heart rate of 69 bpm no significant ST segment abnormality.  Echocardiogram done in 2023 showed ejection fraction 60%, normal right ventricular systolic function, mild aortic valve stenosis with a mean pressure gradient of 9.0 mmHg and a dimensionless index of 0.58.  We will check limited echocardiogram  Patient has been placed on aspirin therapy. I will place the patient on a heparin infusion.  I discussed possible cardiac catheterization with the patient which she stated he would need to consider further.  We will keep n.p.o. for possible heart cath tomorrow.    10/2: Patient have left heart catheterization today.  Patient has been NPO.    2.  Hypertension  Patient has a history of hypertension which currently appears well controlled.  Patient is to be restarted on his home antihypertensive medical therapy    10/2: We will add low-dose carvedilol for additional blood pressure reduction.    3.  Dyslipidemia  Patient should be restarted on statin therapy    10/2: Add statin therapy.    4.  Diabetes mellitus  Management per hospitalist service    5.  Anemia  Hemoglobin of 8.1 this morning.  We will monitor with the addition of a heparin infusion.    6.  Aortic stenosis  Echocardiogram done in 2023 showed ejection fraction 60%, normal right ventricular systolic function, mild aortic valve stenosis with a mean pressure gradient of 9.0 mmHg and  a dimensionless index of 0.58.  Monitor clinically for now.      Past Medical History:  He has a past medical history of CVA (cerebral vascular accident) (CMS/[facility]), DM (diabetes mellitus) (CMS/[facility]), HLD (hyperlipidemia), and HTN (hypertension).    Past Surgical History:  He has a past surgical history that includes CT angio abdomen pelvis w and or wo IV IV contrast (1/30/2023).      Social History:  He reports that he has quit smoking. His smoking use included cigarettes. He has never used smokeless tobacco. He reports current alcohol use of about 6.0 standard drinks of alcohol per week. He reports that he does not use drugs.    Family History:  No family history on file.     Allergies:  Penicillins and Penicillin v    Outpatient Medications:  No current outpatient medications     ROS:  A 14 point review of systems was done and is negative other than as stated in HPI    Vitals:  Vitals:    10/01/23 1946 10/02/23 0132 10/02/23 0410 10/02/23 0842   BP: 150/80 146/71 143/86    BP Location:       Patient Position:       Pulse: 82 70 68 60   Resp: 16 18 18 16   Temp: 37.2 °C (99 °F) 36.6 °C (97.8 °F) 36.9 °C (98.5 °F) 36.2 °C (97.2 °F)   TempSrc:    Temporal   SpO2: 96% 95% 96%    Weight:       Height:           Physical Exam:     Constitutional: Cooperative, in no acute distress, alert, appears stated age.  Skin: Skin color, texture, turgor normal. No rashes or lesions.  Head: Normocephalic. No masses, lesions, tenderness or abnormalities  Eyes: Extraocular movements are grossly intact.  Mouth and throat: Mucous membranes moist  Neck: Neck supple, no carotid bruits, no JVD  Respiratory: Lungs clear to auscultation, no wheezing or rhonchi, no use of accessory muscles  Chest wall: No scars, normal excursion with respiration  Cardiovascular: Regular rhythm with + murmur  Gastrointestinal: Abdomen soft, nontender. Bowel sounds normal.  Musculoskeletal: Strength equal in upper extremities  Extremities: No pitting  edema  Neurologic: Sensation grossly intact, alert and oriented ×3    Intake/Output:   I/O last 2 completed shifts:  In: 627.6 (6.9 mL/kg) [P.O.:600; I.V.:27.6 (0.3 mL/kg)]  Out: 525 (5.8 mL/kg) [Urine:525 (0.2 mL/kg/hr)]  Weight: 90.4 kg     Outpatient Medications  No current facility-administered medications on file prior to encounter.     No current outpatient medications on file prior to encounter.       Scheduled medications  aspirin, 81 mg, oral, Daily  atorvastatin, 40 mg, oral, Daily  carvedilol, 3.125 mg, oral, BID with meals  folic acid, 1 mg, oral, Daily  insulin lispro, 0-10 Units, subcutaneous, TID with meals  pantoprazole, 40 mg, intravenous, Daily before breakfast  polyethylene glycol, 17 g, oral, Daily  traMADol, 50 mg, oral, BID      Continuous medications  [Held by provider] heparin, 0-4,000 Units/hr, Last Rate: 1,200 Units/hr (10/02/23 0251)    PRN medications  PRN medications: acetaminophen, acetaminophen **OR** [DISCONTINUED] acetaminophen **OR** acetaminophen, dextrose, dextrose, glucagon, [Held by provider] heparin, melatonin, ondansetron   No medications prior to admission.       Recent Labs: (past 2 days)  Recent Results (from the past 48 hour(s))   POCT glucose    Collection Time: 09/30/23  9:17 AM   Result Value Ref Range    POC Fingerstick Blood Glucose 152 (A) 70 - 100 mg/dl   POCT GLUCOSE    Collection Time: 09/30/23  9:18 AM   Result Value Ref Range    POCT Glucose 152 (H) 74 - 99 mg/dL   Troponin I, High Sensitivity    Collection Time: 09/30/23 12:35 PM   Result Value Ref Range    Troponin I, High Sensitivity 114 (HH) 0 - 20 ng/L   POCT GLUCOSE    Collection Time: 09/30/23  1:00 PM   Result Value Ref Range    POCT Glucose 104 (H) 74 - 99 mg/dL   Troponin I, High Sensitivity    Collection Time: 09/30/23  3:54 PM   Result Value Ref Range    Troponin I, High Sensitivity 246 (HH) 0 - 20 ng/L   Hemoglobin A1c    Collection Time: 09/30/23  3:54 PM   Result Value Ref Range    Hemoglobin A1C  5.6 see below %    Estimated Average Glucose 114 Not Established mg/dL   POCT GLUCOSE    Collection Time: 09/30/23  5:00 PM   Result Value Ref Range    POCT Glucose 74 74 - 99 mg/dL   POCT GLUCOSE    Collection Time: 09/30/23  9:51 PM   Result Value Ref Range    POCT Glucose 231 (H) 74 - 99 mg/dL   POCT GLUCOSE    Collection Time: 09/30/23 10:44 PM   Result Value Ref Range    POCT Glucose 228 (H) 74 - 99 mg/dL   POCT GLUCOSE    Collection Time: 09/30/23 11:52 PM   Result Value Ref Range    POCT Glucose 240 (H) 74 - 99 mg/dL   Comprehensive metabolic panel    Collection Time: 10/01/23  4:48 AM   Result Value Ref Range    Glucose 176 (H) 74 - 99 mg/dL    Sodium 134 (L) 136 - 145 mmol/L    Potassium 4.1 3.5 - 5.3 mmol/L    Chloride 102 98 - 107 mmol/L    Bicarbonate 25 21 - 32 mmol/L    Anion Gap 11 10 - 20 mmol/L    Urea Nitrogen 17 6 - 23 mg/dL    Creatinine 1.08 0.50 - 1.30 mg/dL    eGFR 77 >60 mL/min/1.73m*2    Calcium 8.8 8.6 - 10.3 mg/dL    Albumin 3.6 3.4 - 5.0 g/dL    Alkaline Phosphatase 71 33 - 136 U/L    Total Protein 6.5 6.4 - 8.2 g/dL    AST 22 9 - 39 U/L    Bilirubin, Total 0.8 0.0 - 1.2 mg/dL    ALT 14 10 - 52 U/L   CBC and Auto Differential    Collection Time: 10/01/23  4:48 AM   Result Value Ref Range    WBC 7.3 4.4 - 11.3 x10*3/uL    nRBC 0.0 0.0 - 0.0 /100 WBCs    RBC 3.00 (L) 4.50 - 5.90 x10*6/uL    Hemoglobin 8.1 (L) 13.5 - 17.5 g/dL    Hematocrit 25.7 (L) 41.0 - 52.0 %    MCV 86 80 - 100 fL    MCH 27.0 26.0 - 34.0 pg    MCHC 31.5 (L) 32.0 - 36.0 g/dL    RDW 17.4 (H) 11.5 - 14.5 %    Platelets 295 150 - 450 x10*3/uL    MPV 9.5 7.5 - 11.5 fL    Neutrophils % 67.8 40.0 - 80.0 %    Immature Granulocytes %, Automated 0.4 0.0 - 0.9 %    Lymphocytes % 18.5 13.0 - 44.0 %    Monocytes % 9.4 2.0 - 10.0 %    Eosinophils % 3.1 0.0 - 6.0 %    Basophils % 0.8 0.0 - 2.0 %    Neutrophils Absolute 4.97 1.20 - 7.70 x10*3/uL    Immature Granulocytes Absolute, Automated 0.03 0.00 - 0.70 x10*3/uL    Lymphocytes  Absolute 1.36 1.20 - 4.80 x10*3/uL    Monocytes Absolute 0.69 0.10 - 1.00 x10*3/uL    Eosinophils Absolute 0.23 0.00 - 0.70 x10*3/uL    Basophils Absolute 0.06 0.00 - 0.10 x10*3/uL   POCT GLUCOSE    Collection Time: 10/01/23  8:13 AM   Result Value Ref Range    POCT Glucose 143 (H) 74 - 99 mg/dL   Heparin Assay, UFH    Collection Time: 10/01/23 12:15 PM   Result Value Ref Range    Heparin Unfractionated <0.1 See Comment Below for Therapeutic Ranges IU/mL   aPTT - baseline    Collection Time: 10/01/23 12:15 PM   Result Value Ref Range    aPTT 31 27 - 38 seconds   Platelet count - baseline    Collection Time: 10/01/23 12:15 PM   Result Value Ref Range    Platelets 268 150 - 450 x10*3/uL   POCT GLUCOSE    Collection Time: 10/01/23 12:46 PM   Result Value Ref Range    POCT Glucose 234 (H) 74 - 99 mg/dL   POCT GLUCOSE    Collection Time: 10/01/23  4:13 PM   Result Value Ref Range    POCT Glucose 210 (H) 74 - 99 mg/dL   POCT GLUCOSE    Collection Time: 10/01/23  7:48 PM   Result Value Ref Range    POCT Glucose 199 (H) 74 - 99 mg/dL   Platelet count - HIT surveillance    Collection Time: 10/02/23  4:46 AM   Result Value Ref Range    Platelets 283 150 - 450 x10*3/uL   Magnesium    Collection Time: 10/02/23  4:46 AM   Result Value Ref Range    Magnesium 1.76 1.60 - 2.40 mg/dL   Basic Metabolic Panel    Collection Time: 10/02/23  4:46 AM   Result Value Ref Range    Glucose 112 (H) 74 - 99 mg/dL    Sodium 136 136 - 145 mmol/L    Potassium 3.8 3.5 - 5.3 mmol/L    Chloride 105 98 - 107 mmol/L    Bicarbonate 25 21 - 32 mmol/L    Anion Gap 10 10 - 20 mmol/L    Urea Nitrogen 13 6 - 23 mg/dL    Creatinine 0.83 0.50 - 1.30 mg/dL    eGFR >90 >60 mL/min/1.73m*2    Calcium 8.9 8.6 - 10.3 mg/dL   CBC and Auto Differential    Collection Time: 10/02/23  4:46 AM   Result Value Ref Range    WBC 7.8 4.4 - 11.3 x10*3/uL    nRBC 0.0 0.0 - 0.0 /100 WBCs    RBC 3.05 (L) 4.50 - 5.90 x10*6/uL    Hemoglobin 8.6 (L) 13.5 - 17.5 g/dL    Hematocrit  26.8 (L) 41.0 - 52.0 %    MCV 88 80 - 100 fL    MCH 28.2 26.0 - 34.0 pg    MCHC 32.1 32.0 - 36.0 g/dL    RDW 17.6 (H) 11.5 - 14.5 %    Platelets 283 150 - 450 x10*3/uL    MPV 9.4 7.5 - 11.5 fL    Neutrophils % 62.8 40.0 - 80.0 %    Immature Granulocytes %, Automated 0.4 0.0 - 0.9 %    Lymphocytes % 22.6 13.0 - 44.0 %    Monocytes % 8.6 2.0 - 10.0 %    Eosinophils % 5.0 0.0 - 6.0 %    Basophils % 0.6 0.0 - 2.0 %    Neutrophils Absolute 4.90 1.20 - 7.70 x10*3/uL    Immature Granulocytes Absolute, Automated 0.03 0.00 - 0.70 x10*3/uL    Lymphocytes Absolute 1.76 1.20 - 4.80 x10*3/uL    Monocytes Absolute 0.67 0.10 - 1.00 x10*3/uL    Eosinophils Absolute 0.39 0.00 - 0.70 x10*3/uL    Basophils Absolute 0.05 0.00 - 0.10 x10*3/uL   Iron and TIBC    Collection Time: 10/02/23  4:46 AM   Result Value Ref Range    Iron 41 35 - 150 ug/dL    UIBC 375 (H) 110 - 370 ug/dL    TIBC 416 240 - 445 ug/dL    % Saturation 10 (L) 25 - 45 %   Vitamin B12    Collection Time: 10/02/23  4:46 AM   Result Value Ref Range    Vitamin B12 286 211 - 911 pg/mL   POCT GLUCOSE    Collection Time: 10/02/23  8:36 AM   Result Value Ref Range    POCT Glucose 134 (H) 74 - 99 mg/dL       ECG  No results found for this or any previous visit (from the past 4464 hour(s)).    Echocardiogram  No results found for this or any previous visit from the past 1095 days.        Umesh Newby DO, FACC, FACOI  10/2/2023  8:54 AM

## 2023-10-02 NOTE — PRE-PROCEDURE NOTE
Cardiology Preprocedure Note    Indication for procedure: The primary encounter diagnosis was Troponin level elevated. Diagnoses of Hypoglycemia and NSTEMI (non-ST elevated myocardial infarction) (CMS/[facility]) were also pertinent to this visit.    Relevant review of systems:  Denies CP or shortness of breath, no edema     Relevant Labs:   Lab Results   Component Value Date    CREATININE 0.83 10/02/2023    EGFR >90 10/02/2023    INR 1.0 09/30/2023    PROTIME 11.8 09/30/2023       Planned Sedation/Anesthesia: Moderate    Airway assessment: normal    Directed physical examination:    S1, S2     Mallampati: III (soft and hard palate and base of uvula visible)    ASA Score: ASA 3 - Patient with moderate systemic disease with functional limitations    Benefits, risks and alternatives of procedure and planned sedation have been discussed with the patient and/or their representative. All questions answered and they agree to proceed.

## 2023-10-02 NOTE — CARE PLAN
Problem: Skin  Goal: Decreased wound size/increased tissue granulation at next dressing change  Outcome: Progressing  Goal: Participates in plan/prevention/treatment measures  Outcome: Progressing  Goal: Prevent/manage excess moisture  Outcome: Progressing  Goal: Prevent/minimize sheer/friction injuries  Outcome: Progressing  Goal: Promote/optimize nutrition  Outcome: Progressing  Goal: Promote skin healing  Outcome: Progressing     Problem: General Stroke  Goal: Demonstrate improvement in neurological exam throughout the shift  Outcome: Progressing  Goal: Maintain BP within ordered limits throughout shift  Outcome: Progressing  Goal: Participate in treatment (ie., meds, therapy) throughout shift  Outcome: Progressing  Goal: No symptoms of aspiration throughout shift  Outcome: Progressing  Goal: No symptoms of hemorrhage throughout shift  Outcome: Progressing  Goal: Tolerate enteral feeding throughout shift  Outcome: Progressing  Goal: Decreased nausea/vomiting throughout shift  Outcome: Progressing  Goal: Controlled blood glucose throughout shift  Outcome: Progressing  Goal: Out of bed three times today  Outcome: Progressing     Problem: ICU Stroke  Goal: Maintain ICP within ordered limits throughout shift  Outcome: Progressing  Goal: Tolerate EVD clamping trial throughout shift  Outcome: Progressing  Goal: Tolerate ventilator weaning trial during shift  Outcome: Progressing  Goal: Maintain patent airway throughout shift  Outcome: Progressing  Goal: Achieve/maintain targeted sodium level throughout shift  Outcome: Progressing   The patient's goals for the shift include      The clinical goals for the shift include Maintain glucose WDL throughout the shift.    Over the shift, the patient did not make progress toward the following goals. Barriers to progression include understanding of plan of care . Recommendations to address these barriers include frequent education

## 2023-10-02 NOTE — CONSULTS
"Inpatient consult to Gastroenterology  Consult performed by: Richard Jimenez MD  Consult ordered by: Angel Panda MD          Reason For Consult  \"Acute on chronic anemia hemoglobin dropping relatively rapidly\"      [facility] Gastroenterology Consultation Note    ASSESSMENT and PLAN:       Santana Mayo is a 63 y.o. male with a significant past medical history of DM2, CAD, HTN, CVA, HLD who presented with slurred speech and vision changes. GI was consulted for \"Acute on chronic anemia hemoglobin dropping relatively rapidly\".       Anemia  No overt GI bleeding. Patient admitted with concern for CVA and NSTEMI. Cardiac cath is scheduled for today. In the absence of overt bleeding would not plan for additional GI evaluation at this time. Previously had had a duodenal ulcer and should remain on PPI therapy for this. He has never had a colonoscopy so an outpatient colonoscopy would be reasonable at a time when it would be safe to hold any antiplatelet/anticoagulation. Risks of antithrombotics/anti-platelet medications include GI bleeding and those risks can't be eliminated completely regardless of endoscopic work up. Some data suggests benefit of endoscopy in patients with significant/acute GI bleeding in the setting of acute MI. That does not apply to this patient and I would not recommend delaying needed cardiac intervention for GI work up. Short term risk of morbidity/mortality clearly much higher with acute cardiac disease and untreated acute cardiac processes would also markedly increase risks associated with invasive procedures/endoscopy. ASGE guidelines recommend restarting anticoagulation once hemostasis has been achieved and in this case there is no evidence of bleeding.   - monitor H&H and transfuse as needed  - continue home dose PPI        Richard Jimenez MD        Gastroenterology    [facility]    Clinical   [facility] " "[place]        HISTORY OF PRESENT ILLNESS:   Consult Reason: \"Acute on chronic anemia hemoglobin dropping relatively rapidly\"    History Of Present Illness:    Santana Mayo is a 63 y.o. male with a significant past medical history of DM2, CAD, HTN, CVA, HLD who presented with slurred speech and vision changes. GI was consulted for \"Acute on chronic anemia hemoglobin dropping relatively rapidly\".      Mr. Mayo reports that he has not had any recent GI issues. He specifically denies any GI bleeding. He says that stools are dark brown with no hematochezia or melena. He has not had any abdominal pain, N/V, or hematemesis. Since admission he was found to have an elevated troponin and cardiology is planning for heart cath today. He previously had an EGD in January and he says that he has never had a colonoscopy.      Endoscopy History:  1/30/2023 - EGD: normal esophagus, oozing duodenal ulcer with visible vessel treated with injection of epinephrine and hemostatic clip placement x2      Review of systems:     Patient denies any heartburn/GERD, N/V, dysphagia, odynophagia, abdominal pain, diarrhea, constipation, hematemesis, hematochezia, melena, or weight loss.    I performed a complete 10 point review of systems and it is negative except as noted in HPI or above.    All other systems have been reviewed and are negative.        PAST HISTORIES:       Past Medical History:  He has a past medical history of CVA (cerebral vascular accident) (CMS/[facility]), DM (diabetes mellitus) (CMS/[facility]), HLD (hyperlipidemia), and HTN (hypertension).    Past Surgical History:  He has a past surgical history that includes CT angio abdomen pelvis w and or wo IV IV contrast (1/30/2023).      Social History:  He reports that he has quit smoking. His smoking use included cigarettes. He has never used smokeless tobacco. He reports current alcohol use of about 6.0 standard drinks of alcohol per week. He reports that he does not use " "drugs.    Family History:  No known GI disease, specifically denies pancreatitis, Crohn's, colon cancer, gastroesophageal cancer, or ulcerative colitis.    No family history on file.     Allergies:  Penicillins and Penicillin v      OBJECTIVE:       Last Recorded Vitals:  Vitals:    10/01/23 1946 10/02/23 0132 10/02/23 0410 10/02/23 0842   BP: 150/80 146/71 143/86    BP Location:       Patient Position:       Pulse: 82 70 68 60   Resp: 16 18 18 16   Temp: 37.2 °C (99 °F) 36.6 °C (97.8 °F) 36.9 °C (98.5 °F) 36.2 °C (97.2 °F)   TempSrc:    Temporal   SpO2: 96% 95% 96%    Weight:       Height:         /86   Pulse 60   Temp 36.2 °C (97.2 °F) (Temporal)   Resp 16   Ht 1.803 m (5' 11\")   Wt 90.4 kg (199 lb 4.7 oz)   SpO2 96%   BMI 27.80 kg/m²      Physical Exam:    Physical Exam  Vitals reviewed.   Constitutional:       General: He is not in acute distress.     Appearance: He is not ill-appearing.   HENT:      Head: Normocephalic and atraumatic.   Eyes:      General: No scleral icterus.  Cardiovascular:      Rate and Rhythm: Normal rate and regular rhythm.      Pulses: Normal pulses.      Heart sounds: Normal heart sounds. No murmur heard.  Pulmonary:      Effort: Pulmonary effort is normal. No respiratory distress.      Breath sounds: Normal breath sounds. No wheezing.   Abdominal:      General: Bowel sounds are normal.      Palpations: Abdomen is soft.      Tenderness: There is no abdominal tenderness. There is no rebound.   Musculoskeletal:         General: No swelling or deformity.   Skin:     General: Skin is warm and dry.      Coloration: Skin is not jaundiced.      Findings: No rash.   Neurological:      General: No focal deficit present.      Mental Status: He is alert and oriented to person, place, and time.   Psychiatric:         Mood and Affect: Mood normal.         Behavior: Behavior normal.         Thought Content: Thought content normal.         Judgment: Judgment normal.           Inpatient " Medications:  aspirin, 81 mg, oral, Daily  atorvastatin, 40 mg, oral, Daily  carvedilol, 3.125 mg, oral, BID with meals  folic acid, 1 mg, oral, Daily  insulin lispro, 0-10 Units, subcutaneous, TID with meals  pantoprazole, 40 mg, intravenous, Daily before breakfast  polyethylene glycol, 17 g, oral, Daily  traMADol, 50 mg, oral, BID      PRN medications: acetaminophen, acetaminophen **OR** [DISCONTINUED] acetaminophen **OR** acetaminophen, dextrose, dextrose, glucagon, [Held by provider] heparin, melatonin, ondansetron    Outpatient Medications:  Prior to Admission medications    Not on File       LABS AND IMAGING:     Last Labs:  CBC - 10/2/2023:  4:46 AM  7.8 8.6 283; 283    26.8      CMP - 10/2/2023:  4:46 AM  8.9 6.5 22 --- 0.8   3.1 3.6 14 71      PTT - 10/1/2023: 12:15 PM  1.0   11.8 31       Lab Results   Component Value Date    WBC 7.8 10/02/2023    WBC 7.3 10/01/2023    WBC 7.0 09/30/2023    HGB 8.6 (L) 10/02/2023    HGB 8.1 (L) 10/01/2023    HGB 9.7 (L) 09/30/2023    HCT 26.8 (L) 10/02/2023    HCT 25.7 (L) 10/01/2023    HCT 29.9 (L) 09/30/2023    MCV 88 10/02/2023    MCV 86 10/01/2023    MCV 86 09/30/2023     10/02/2023     10/02/2023     10/01/2023       CT brain attack head wo IV contrast    Result Date: 9/30/2023  Interpreted By:  Schoenberger, Joseph, STUDY: CT BRAIN ATTACK HEAD WO IV CONTRAST;  9/30/2023 8:29 am   INDICATION: Signs/Symptoms:stroke alert.   COMPARISON: None.   ACCESSION NUMBER(S): [id number]   ORDERING CLINICIAN: RUDOLPH SCHULTZ   TECHNIQUE: Noncontrast axial CT scan of head was performed. Angled reformats in brain and bone windows were generated. The images were reviewed in bone, brain, blood and soft tissue windows.   FINDINGS: CSF Spaces: Enlarged due to parenchymal volume loss. Normal configuration intact basal cisterns. There is no extraaxial fluid collection.   Parenchyma: Periventricular deep white matter hypoattenuation consistent with small vessel  ischemic white matter demyelination. Remote infarct extending from the posterior right frontal deep white matter into the right basal ganglia. Stroke alert the grey-white differentiation is intact. There is no mass effect or midline shift. There is no intracranial hemorrhage.   Calvarium: The calvarium is unremarkable.   Paranasal sinuses and mastoids: Visualized paranasal sinuses and mastoids are clear.       No evidence of acute cortical infarct or intracranial hemorrhage.   Atrophy and chronic small vessel ischemic white matter demyelination. Remote deep white matter/basal ganglia infarct right hemisphere.   MACRO: .  No definite acute finding. Atrophy and chronic ischemic white matter demyelination. Joseph Schoenberger discussed the significance and urgency of this critical finding by telephone with  RUDOLPH SCHULTZ on 9/30/2023 at 8:38 am.  (**-RCF-**) Findings:  See findings.     Signed by: Joseph Schoenberger 9/30/2023 8:41 AM Dictation workstation:   [id number]

## 2023-10-02 NOTE — PROGRESS NOTES
10/02/23 1127   Discharge Planning   Living Arrangements Alone   Support Systems Home care staff;Friends/neighbors;/   Assistance Needed Patient requires assistance with bathing-Current Home Health Aid 3x weekly   Type of Residence Private residence   Home or Post Acute Services In home services   Type of Home Care Services Home OT;Home PT;Home nursing visits;Home health aide   Patient expects to be discharged to: Home with resumed [facility]   Does the patient need discharge transport arranged? Yes   RoundTrip coordination needed? Yes   Has discharge transport been arranged? No     Patient active with [facility]. Will send referral to resume care. He has a Home Health Aid 3x a week that assists with bathing, laundry and cleaning.

## 2023-10-02 NOTE — CONSULTS
[facility] V[facility] Cardiology    Patient Name: Santana Mayo  Patient : 1960  Room/Bed: -A    Date: 10/02/23  Time: 8:44 AM    Subjective:    Patient reports feeling well this morning without any new cardiac problems or complaints.  Patient is planned to undergo left heart catheterization today.    Assessment/Plan:   1.  NSTEMI  Patient presented with atypical symptoms of shakiness, diaphoresis, mild shortness of breath as described in HPI.  Subsequently found to have elevated troponin of 135-114-246.  ECG showed sinus rhythm with a heart rate of 69 bpm no significant ST segment abnormality.  Echocardiogram done in 2023 showed ejection fraction 60%, normal right ventricular systolic function, mild aortic valve stenosis with a mean pressure gradient of 9.0 mmHg and a dimensionless index of 0.58.  We will check limited echocardiogram  Patient has been placed on aspirin therapy. I will place the patient on a heparin infusion.  I discussed possible cardiac catheterization with the patient which she stated he would need to consider further.  We will keep n.p.o. for possible heart cath tomorrow.    10/2: Patient have left heart catheterization today.  Patient has been NPO.    2.  Hypertension  Patient has a history of hypertension which currently appears well controlled.  Patient is to be restarted on his home antihypertensive medical therapy    10/2: We will add low-dose carvedilol for additional blood pressure reduction.    3.  Dyslipidemia  Patient should be restarted on statin therapy    10/2: Add statin therapy.    4.  Diabetes mellitus  Management per hospitalist service    5.  Anemia  Hemoglobin of 8.1 this morning.  We will monitor with the addition of a heparin infusion.    6.  Aortic stenosis  Echocardiogram done in 2023 showed ejection fraction 60%, normal right ventricular systolic function, mild aortic valve stenosis with a mean pressure gradient of 9.0 mmHg and  a dimensionless index of 0.58.  Monitor clinically for now.      Past Medical History:  He has a past medical history of CVA (cerebral vascular accident) (CMS/[facility]), DM (diabetes mellitus) (CMS/[facility]), HLD (hyperlipidemia), and HTN (hypertension).    Past Surgical History:  He has a past surgical history that includes CT angio abdomen pelvis w and or wo IV IV contrast (1/30/2023).      Social History:  He reports that he has quit smoking. His smoking use included cigarettes. He has never used smokeless tobacco. He reports current alcohol use of about 6.0 standard drinks of alcohol per week. He reports that he does not use drugs.    Family History:  No family history on file.     Allergies:  Penicillins and Penicillin v    Outpatient Medications:  No current outpatient medications     ROS:  A 14 point review of systems was done and is negative other than as stated in HPI    Vitals:  Vitals:    10/01/23 1531 10/01/23 1946 10/02/23 0132 10/02/23 0410   BP: 156/74 150/80 146/71 143/86   BP Location: Right arm      Patient Position: Lying      Pulse: 83 82 70 68   Resp:  16 18 18   Temp: 36.9 °C (98.4 °F) 37.2 °C (99 °F) 36.6 °C (97.8 °F) 36.9 °C (98.5 °F)   TempSrc: Temporal      SpO2:  96% 95% 96%   Weight:       Height:           Physical Exam:     Constitutional: Cooperative, in no acute distress, alert, appears stated age.  Skin: Skin color, texture, turgor normal. No rashes or lesions.  Head: Normocephalic. No masses, lesions, tenderness or abnormalities  Eyes: Extraocular movements are grossly intact.  Mouth and throat: Mucous membranes moist  Neck: Neck supple, no carotid bruits, no JVD  Respiratory: Lungs clear to auscultation, no wheezing or rhonchi, no use of accessory muscles  Chest wall: No scars, normal excursion with respiration  Cardiovascular: Regular rhythm with + murmur  Gastrointestinal: Abdomen soft, nontender. Bowel sounds normal.  Musculoskeletal: Strength equal in upper extremities  Extremities: No  pitting edema  Neurologic: Sensation grossly intact, alert and oriented ×3    Intake/Output:   I/O last 2 completed shifts:  In: 627.6 (6.9 mL/kg) [P.O.:600; I.V.:27.6 (0.3 mL/kg)]  Out: 525 (5.8 mL/kg) [Urine:525 (0.2 mL/kg/hr)]  Weight: 90.4 kg     Outpatient Medications  No current facility-administered medications on file prior to encounter.     No current outpatient medications on file prior to encounter.       Scheduled medications  aspirin, 81 mg, oral, Daily  folic acid, 1 mg, oral, Daily  insulin lispro, 0-10 Units, subcutaneous, TID with meals  pantoprazole, 40 mg, intravenous, Daily before breakfast  polyethylene glycol, 17 g, oral, Daily  traMADol, 50 mg, oral, BID      Continuous medications  [Held by provider] heparin, 0-4,000 Units/hr, Last Rate: 1,200 Units/hr (10/02/23 0251)    PRN medications  PRN medications: acetaminophen, acetaminophen **OR** [DISCONTINUED] acetaminophen **OR** acetaminophen, dextrose, dextrose, glucagon, [Held by provider] heparin, melatonin, ondansetron   No medications prior to admission.       Recent Labs: (past 2 days)  Recent Results (from the past 48 hour(s))   CBC and Auto Differential    Collection Time: 09/30/23  8:45 AM   Result Value Ref Range    WBC 7.0 4.4 - 11.3 x10*3/uL    nRBC 0.0 0.0 - 0.0 /100 WBCs    RBC 3.47 (L) 4.50 - 5.90 x10*6/uL    Hemoglobin 9.7 (L) 13.5 - 17.5 g/dL    Hematocrit 29.9 (L) 41.0 - 52.0 %    MCV 86 80 - 100 fL    MCH 28.0 26.0 - 34.0 pg    MCHC 32.4 32.0 - 36.0 g/dL    RDW 17.3 (H) 11.5 - 14.5 %    Platelets 324 150 - 450 x10*3/uL    MPV 9.0 7.5 - 11.5 fL    Neutrophils % 75.4 40.0 - 80.0 %    Immature Granulocytes %, Automated 0.4 0.0 - 0.9 %    Lymphocytes % 14.0 13.0 - 44.0 %    Monocytes % 6.9 2.0 - 10.0 %    Eosinophils % 2.4 0.0 - 6.0 %    Basophils % 0.9 0.0 - 2.0 %    Neutrophils Absolute 5.26 1.20 - 7.70 x10*3/uL    Immature Granulocytes Absolute, Automated 0.03 0.00 - 0.70 x10*3/uL    Lymphocytes Absolute 0.98 (L) 1.20 - 4.80  x10*3/uL    Monocytes Absolute 0.48 0.10 - 1.00 x10*3/uL    Eosinophils Absolute 0.17 0.00 - 0.70 x10*3/uL    Basophils Absolute 0.06 0.00 - 0.10 x10*3/uL   Comprehensive metabolic panel    Collection Time: 09/30/23  8:45 AM   Result Value Ref Range    Glucose 221 (H) 74 - 99 mg/dL    Sodium 132 (L) 136 - 145 mmol/L    Potassium 5.4 (H) 3.5 - 5.3 mmol/L    Chloride 99 98 - 107 mmol/L    Bicarbonate 22 21 - 32 mmol/L    Anion Gap 16 10 - 20 mmol/L    Urea Nitrogen 10 6 - 23 mg/dL    Creatinine 0.89 0.50 - 1.30 mg/dL    eGFR >90 >60 mL/min/1.73m*2    Calcium 9.4 8.6 - 10.3 mg/dL    Albumin 4.4 3.4 - 5.0 g/dL    Alkaline Phosphatase 86 33 - 136 U/L    Total Protein 7.9 6.4 - 8.2 g/dL    AST 32 9 - 39 U/L    Bilirubin, Total 1.0 0.0 - 1.2 mg/dL    ALT 19 10 - 52 U/L   Troponin I, High Sensitivity    Collection Time: 09/30/23  8:45 AM   Result Value Ref Range    Troponin I, High Sensitivity 135 (HH) 0 - 20 ng/L   Protime-INR    Collection Time: 09/30/23  8:45 AM   Result Value Ref Range    Protime 11.8 9.8 - 12.8 seconds    INR 1.0 0.9 - 1.1   APTT    Collection Time: 09/30/23  8:45 AM   Result Value Ref Range    aPTT 28 27 - 38 seconds   POCT glucose    Collection Time: 09/30/23  9:17 AM   Result Value Ref Range    POC Fingerstick Blood Glucose 152 (A) 70 - 100 mg/dl   POCT GLUCOSE    Collection Time: 09/30/23  9:18 AM   Result Value Ref Range    POCT Glucose 152 (H) 74 - 99 mg/dL   Troponin I, High Sensitivity    Collection Time: 09/30/23 12:35 PM   Result Value Ref Range    Troponin I, High Sensitivity 114 (HH) 0 - 20 ng/L   POCT GLUCOSE    Collection Time: 09/30/23  1:00 PM   Result Value Ref Range    POCT Glucose 104 (H) 74 - 99 mg/dL   Troponin I, High Sensitivity    Collection Time: 09/30/23  3:54 PM   Result Value Ref Range    Troponin I, High Sensitivity 246 (HH) 0 - 20 ng/L   Hemoglobin A1c    Collection Time: 09/30/23  3:54 PM   Result Value Ref Range    Hemoglobin A1C 5.6 see below %    Estimated Average  Glucose 114 Not Established mg/dL   POCT GLUCOSE    Collection Time: 09/30/23  5:00 PM   Result Value Ref Range    POCT Glucose 74 74 - 99 mg/dL   POCT GLUCOSE    Collection Time: 09/30/23  9:51 PM   Result Value Ref Range    POCT Glucose 231 (H) 74 - 99 mg/dL   POCT GLUCOSE    Collection Time: 09/30/23 10:44 PM   Result Value Ref Range    POCT Glucose 228 (H) 74 - 99 mg/dL   POCT GLUCOSE    Collection Time: 09/30/23 11:52 PM   Result Value Ref Range    POCT Glucose 240 (H) 74 - 99 mg/dL   Comprehensive metabolic panel    Collection Time: 10/01/23  4:48 AM   Result Value Ref Range    Glucose 176 (H) 74 - 99 mg/dL    Sodium 134 (L) 136 - 145 mmol/L    Potassium 4.1 3.5 - 5.3 mmol/L    Chloride 102 98 - 107 mmol/L    Bicarbonate 25 21 - 32 mmol/L    Anion Gap 11 10 - 20 mmol/L    Urea Nitrogen 17 6 - 23 mg/dL    Creatinine 1.08 0.50 - 1.30 mg/dL    eGFR 77 >60 mL/min/1.73m*2    Calcium 8.8 8.6 - 10.3 mg/dL    Albumin 3.6 3.4 - 5.0 g/dL    Alkaline Phosphatase 71 33 - 136 U/L    Total Protein 6.5 6.4 - 8.2 g/dL    AST 22 9 - 39 U/L    Bilirubin, Total 0.8 0.0 - 1.2 mg/dL    ALT 14 10 - 52 U/L   CBC and Auto Differential    Collection Time: 10/01/23  4:48 AM   Result Value Ref Range    WBC 7.3 4.4 - 11.3 x10*3/uL    nRBC 0.0 0.0 - 0.0 /100 WBCs    RBC 3.00 (L) 4.50 - 5.90 x10*6/uL    Hemoglobin 8.1 (L) 13.5 - 17.5 g/dL    Hematocrit 25.7 (L) 41.0 - 52.0 %    MCV 86 80 - 100 fL    MCH 27.0 26.0 - 34.0 pg    MCHC 31.5 (L) 32.0 - 36.0 g/dL    RDW 17.4 (H) 11.5 - 14.5 %    Platelets 295 150 - 450 x10*3/uL    MPV 9.5 7.5 - 11.5 fL    Neutrophils % 67.8 40.0 - 80.0 %    Immature Granulocytes %, Automated 0.4 0.0 - 0.9 %    Lymphocytes % 18.5 13.0 - 44.0 %    Monocytes % 9.4 2.0 - 10.0 %    Eosinophils % 3.1 0.0 - 6.0 %    Basophils % 0.8 0.0 - 2.0 %    Neutrophils Absolute 4.97 1.20 - 7.70 x10*3/uL    Immature Granulocytes Absolute, Automated 0.03 0.00 - 0.70 x10*3/uL    Lymphocytes Absolute 1.36 1.20 - 4.80 x10*3/uL     Monocytes Absolute 0.69 0.10 - 1.00 x10*3/uL    Eosinophils Absolute 0.23 0.00 - 0.70 x10*3/uL    Basophils Absolute 0.06 0.00 - 0.10 x10*3/uL   POCT GLUCOSE    Collection Time: 10/01/23  8:13 AM   Result Value Ref Range    POCT Glucose 143 (H) 74 - 99 mg/dL   Heparin Assay, UFH    Collection Time: 10/01/23 12:15 PM   Result Value Ref Range    Heparin Unfractionated <0.1 See Comment Below for Therapeutic Ranges IU/mL   aPTT - baseline    Collection Time: 10/01/23 12:15 PM   Result Value Ref Range    aPTT 31 27 - 38 seconds   Platelet count - baseline    Collection Time: 10/01/23 12:15 PM   Result Value Ref Range    Platelets 268 150 - 450 x10*3/uL   POCT GLUCOSE    Collection Time: 10/01/23 12:46 PM   Result Value Ref Range    POCT Glucose 234 (H) 74 - 99 mg/dL   POCT GLUCOSE    Collection Time: 10/01/23  4:13 PM   Result Value Ref Range    POCT Glucose 210 (H) 74 - 99 mg/dL   POCT GLUCOSE    Collection Time: 10/01/23  7:48 PM   Result Value Ref Range    POCT Glucose 199 (H) 74 - 99 mg/dL   Platelet count - HIT surveillance    Collection Time: 10/02/23  4:46 AM   Result Value Ref Range    Platelets 283 150 - 450 x10*3/uL   Magnesium    Collection Time: 10/02/23  4:46 AM   Result Value Ref Range    Magnesium 1.76 1.60 - 2.40 mg/dL   Basic Metabolic Panel    Collection Time: 10/02/23  4:46 AM   Result Value Ref Range    Glucose 112 (H) 74 - 99 mg/dL    Sodium 136 136 - 145 mmol/L    Potassium 3.8 3.5 - 5.3 mmol/L    Chloride 105 98 - 107 mmol/L    Bicarbonate 25 21 - 32 mmol/L    Anion Gap 10 10 - 20 mmol/L    Urea Nitrogen 13 6 - 23 mg/dL    Creatinine 0.83 0.50 - 1.30 mg/dL    eGFR >90 >60 mL/min/1.73m*2    Calcium 8.9 8.6 - 10.3 mg/dL   CBC and Auto Differential    Collection Time: 10/02/23  4:46 AM   Result Value Ref Range    WBC 7.8 4.4 - 11.3 x10*3/uL    nRBC 0.0 0.0 - 0.0 /100 WBCs    RBC 3.05 (L) 4.50 - 5.90 x10*6/uL    Hemoglobin 8.6 (L) 13.5 - 17.5 g/dL    Hematocrit 26.8 (L) 41.0 - 52.0 %    MCV 88 80 - 100  fL    MCH 28.2 26.0 - 34.0 pg    MCHC 32.1 32.0 - 36.0 g/dL    RDW 17.6 (H) 11.5 - 14.5 %    Platelets 283 150 - 450 x10*3/uL    MPV 9.4 7.5 - 11.5 fL    Neutrophils % 62.8 40.0 - 80.0 %    Immature Granulocytes %, Automated 0.4 0.0 - 0.9 %    Lymphocytes % 22.6 13.0 - 44.0 %    Monocytes % 8.6 2.0 - 10.0 %    Eosinophils % 5.0 0.0 - 6.0 %    Basophils % 0.6 0.0 - 2.0 %    Neutrophils Absolute 4.90 1.20 - 7.70 x10*3/uL    Immature Granulocytes Absolute, Automated 0.03 0.00 - 0.70 x10*3/uL    Lymphocytes Absolute 1.76 1.20 - 4.80 x10*3/uL    Monocytes Absolute 0.67 0.10 - 1.00 x10*3/uL    Eosinophils Absolute 0.39 0.00 - 0.70 x10*3/uL    Basophils Absolute 0.05 0.00 - 0.10 x10*3/uL   Iron and TIBC    Collection Time: 10/02/23  4:46 AM   Result Value Ref Range    Iron 41 35 - 150 ug/dL    UIBC 375 (H) 110 - 370 ug/dL    TIBC 416 240 - 445 ug/dL    % Saturation 10 (L) 25 - 45 %   Vitamin B12    Collection Time: 10/02/23  4:46 AM   Result Value Ref Range    Vitamin B12 286 211 - 911 pg/mL       ECG  No results found for this or any previous visit (from the past 4464 hour(s)).    Echocardiogram  No results found for this or any previous visit from the past 1095 days.        Umesh Newby DO, FACC, FACOI  10/2/2023  8:44 AM

## 2023-10-02 NOTE — NURSING NOTE
With rounds, patient resting in bed.  C/o pain, medicated as per MAR.  Assessment as charted.  No acute distress, denies CP, dizziness, weakness different from baseline.  Has call light and personal items within reach.  Able to make needs known.  Bed alarm maintained for safety.

## 2023-10-02 NOTE — PROGRESS NOTES
Santana Mayo is a 63 y.o. male on day 2 of admission presenting with Troponin level elevated.    Subjective   Santana Mayo is a 63 y.o. male with PMH of prior stroke with residual left sided deficits, HLD, NIDDM II, who presents with concern for stroke.  States that he woke up this morning his vision was very blurred, unable to see anything effectively.  Presents life alert for EMS assistance.  Does report slurred speech upon EMS arrival.  Denies any headache, increased left-sided weakness from baseline, shortness of breath, chest pain, nausea, vomiting, diarrhea.  EMS checked his glucose levels, noted to be in the 60s and was given an amp of D50.  Patient denies any recent medication changes.  States he does not take any insulin.  States he has been taking his antidiabetic regimen as prescribed.  States his glucose levels have been well controlled recently.     ED course: HR 79, RR 20, /89, pulse ox 99% on room air.  No leukocytosis, Hgb 9.7.  Glucose 44 on arrival.  Chemistries remarkable for K5.4, troponin 135 -> 114.  CT head shows no acute intracranial process.  Given amp of D50 in ED with glucose increased to 200s.  Given hypoglycemia and elevated troponin decision made for admission.    10/1: Patient states he has not really ever had troubles with his sugars dropping.  It appears on reviewing his labs he has had some chronic anemia and his provider at primary care had asked him about dark or bloody stool.  The patient denies any bright red blood per stool but he is fairly anemic and his hemoglobin has been dropping especially in the last 24 hours.  Hemoglobin had dropped from 9.7-8.1.  We will be stopping heparin drip at this time.  Cardiology is recommending possibly cardiac cath and endocrinology notes they think this would be a good idea.  I suspect the patient did have a fairly primary hypoglycemic episode of unknown etiology that may have helped precipitate his type II MI.  Although it is  "difficult to say.  The patient notes no acute issue the day before coming in that he is aware of.  He states he may not have eaten as well as he normally does not like before.  There may be more issues going on but his initial complaints of confusion and possible stroke were likely from hypoglycemia.  Do suspect he likely does have some significant coronary disease because of his ongoing diabetes etc.  We will continue to follow with endocrinology, cardiology and will also involve GI.          Past Medical History  He has no past medical history on file.     Surgical History  He has a past surgical history that includes CT angio abdomen pelvis w and or wo IV IV contrast (1/30/2023).     Social History  Former smoker quit ~10 years ago.  Drinks few beers every couple of days, last drink 5 days ago     Family History  Family History   No family history on file.        Allergies  Penicillins and Penicillin v          Objective     Physical Exam  HENT:      Left Ear: External ear normal.      Mouth/Throat:      Mouth: Mucous membranes are dry.   Eyes:      General: Scleral icterus present.         Right eye: Discharge present.      Extraocular Movements: Extraocular movements intact.   Cardiovascular:      Rate and Rhythm: Normal rate and regular rhythm.      Heart sounds: Murmur heard.   Pulmonary:      Effort: Pulmonary effort is normal. No respiratory distress.   Abdominal:      General: Abdomen is flat.      Palpations: Abdomen is soft.   Musculoskeletal:      Left lower leg: No edema.   Skin:     Findings: Lesion present.          Neurological:      Mental Status: He is alert.         Last Recorded Vitals  Blood pressure 156/85, pulse 63, temperature 36.8 °C (98.2 °F), temperature source Temporal, resp. rate 16, height 1.803 m (5' 11\"), weight 90.4 kg (199 lb 4.7 oz), SpO2 93 %.  Intake/Output last 3 Shifts:  I/O last 3 completed shifts:  In: 627.6 (6.9 mL/kg) [P.O.:600; I.V.:27.6 (0.3 mL/kg)]  Out: 525 (5.8 mL/kg) " [Urine:525 (0.2 mL/kg/hr)]  Weight: 90.4 kg     Relevant Results           This patient currently has cardiac telemetry ordered; if you would like to modify or discontinue the telemetry order, click here to go to the orders activity to modify/discontinue the order.        Scheduled medications  aspirin, 81 mg, oral, Daily  atorvastatin, 40 mg, oral, Daily  carvedilol, 3.125 mg, oral, BID with meals  cyanocobalamin, 1,000 mcg, oral, Daily  [START ON 10/3/2023] ferrous sulfate, 65 mg of iron, oral, Daily with breakfast  folic acid, 1 mg, oral, Daily  insulin lispro, 0-10 Units, subcutaneous, TID with meals  pantoprazole, 40 mg, intravenous, Daily before breakfast  polyethylene glycol, 17 g, oral, Daily  traMADol, 50 mg, oral, BID      Continuous medications  [Held by provider] heparin, 0-4,000 Units/hr, Last Rate: 1,200 Units/hr (10/02/23 0251)      PRN medications  PRN medications: acetaminophen, acetaminophen **OR** [DISCONTINUED] acetaminophen **OR** acetaminophen, dextrose, dextrose, glucagon, [Held by provider] heparin, melatonin, ondansetron           Assessment/Plan   Principal Problem:    Troponin level elevated  Active Problems:    Type II diabetes mellitus with hypoglycemia (CMS/[facility])    Stroke (CMS/[facility])    HTN (hypertension)    HLD (hyperlipidemia)    B12 deficiency    Iron deficiency    NSTEMI (non-ST elevated myocardial infarction) (CMS/[facility])      Patient with acute hypoglycemic episode  Blood sugars improved  Currently covered with sliding scale insulin 3 times daily AC  Endocrinology consulted  Consider heart catheterization next 24 to 48 hours  Cardiology on consult  Stop heparin drip  Supplement iron and B12  GI consulted  Resume tramadol for low back pain in left toe pain  Check labs  See orders for complete plan          I spent 45 minutes in the professional and overall care of this patient.      Angel Panda MD

## 2023-10-02 NOTE — POST-PROCEDURE NOTE
Cardiac Cath Lab Post procedure Note    Procedure Date: 10/2/2023    Indication(s)/Pre - Procedure Diagnoses: ACS greater than 24 hours NSTEMI    Procedure performed by: Timi Raymundo MD    Access: Right Radial artery with 6Fr. Sheath s/p closure with TR band. Significant tortuous R subclavian, RRA access aborted and groin access was obtained.   Severe peripheral arterial disease. RCFA severe PAD   LCFA access with 6 Fr. Sheath s/p closure with 6Fr. VIP Angioseal.     Sedation: Moderate     Post procedure findings:     Severe left main stenosis   Chronic total occlusion of RCA    Complication: None    Blood loss: minimal     Disposition: Recovery area     Recommendation:   Will recommend cardiac surgery evaluation for CABG.   Findings discussed with referring cardiologist.    ASA and high intensity statin therapy.     Electronically signed by: Timi Raymundo MD, 10/2/2023 7:42 PM

## 2023-10-03 LAB
ANION GAP SERPL CALC-SCNC: 12 MMOL/L (ref 10–20)
BASOPHILS # BLD AUTO: 0.05 X10*3/UL (ref 0–0.1)
BASOPHILS NFR BLD AUTO: 0.7 %
BUN SERPL-MCNC: 13 MG/DL (ref 6–23)
CALCIUM SERPL-MCNC: 8.7 MG/DL (ref 8.6–10.3)
CHLORIDE SERPL-SCNC: 105 MMOL/L (ref 98–107)
CO2 SERPL-SCNC: 21 MMOL/L (ref 21–32)
CREAT SERPL-MCNC: 0.86 MG/DL (ref 0.5–1.3)
EJECTION FRACTION APICAL 4 CHAMBER: 51.9
EOSINOPHIL # BLD AUTO: 0.47 X10*3/UL (ref 0–0.7)
EOSINOPHIL NFR BLD AUTO: 6.2 %
ERYTHROCYTE [DISTWIDTH] IN BLOOD BY AUTOMATED COUNT: 17.2 % (ref 11.5–14.5)
GFR SERPL CREATININE-BSD FRML MDRD: >90 ML/MIN/1.73M*2
GLUCOSE BLD MANUAL STRIP-MCNC: 103 MG/DL (ref 74–99)
GLUCOSE BLD MANUAL STRIP-MCNC: 117 MG/DL (ref 74–99)
GLUCOSE BLD MANUAL STRIP-MCNC: 156 MG/DL (ref 74–99)
GLUCOSE BLD MANUAL STRIP-MCNC: 158 MG/DL (ref 74–99)
GLUCOSE BLD MANUAL STRIP-MCNC: 191 MG/DL (ref 74–99)
GLUCOSE BLD MANUAL STRIP-MCNC: 44 MG/DL (ref 74–99)
GLUCOSE SERPL-MCNC: 90 MG/DL (ref 74–99)
HCT VFR BLD AUTO: 26.2 % (ref 41–52)
HGB BLD-MCNC: 8.2 G/DL (ref 13.5–17.5)
IMM GRANULOCYTES # BLD AUTO: 0.02 X10*3/UL (ref 0–0.7)
IMM GRANULOCYTES NFR BLD AUTO: 0.3 % (ref 0–0.9)
LYMPHOCYTES # BLD AUTO: 1.56 X10*3/UL (ref 1.2–4.8)
LYMPHOCYTES NFR BLD AUTO: 20.5 %
MAGNESIUM SERPL-MCNC: 1.79 MG/DL (ref 1.6–2.4)
MCH RBC QN AUTO: 27.5 PG (ref 26–34)
MCHC RBC AUTO-ENTMCNC: 31.3 G/DL (ref 32–36)
MCV RBC AUTO: 88 FL (ref 80–100)
MONOCYTES # BLD AUTO: 0.71 X10*3/UL (ref 0.1–1)
MONOCYTES NFR BLD AUTO: 9.3 %
NEUTROPHILS # BLD AUTO: 4.81 X10*3/UL (ref 1.2–7.7)
NEUTROPHILS NFR BLD AUTO: 63 %
NRBC BLD-RTO: 0 /100 WBCS (ref 0–0)
PLATELET # BLD AUTO: 276 X10*3/UL (ref 150–450)
PMV BLD AUTO: 9.2 FL (ref 7.5–11.5)
POTASSIUM SERPL-SCNC: 3.9 MMOL/L (ref 3.5–5.3)
RBC # BLD AUTO: 2.98 X10*6/UL (ref 4.5–5.9)
SODIUM SERPL-SCNC: 134 MMOL/L (ref 136–145)
WBC # BLD AUTO: 7.6 X10*3/UL (ref 4.4–11.3)

## 2023-10-03 PROCEDURE — 2500000004 HC RX 250 GENERAL PHARMACY W/ HCPCS (ALT 636 FOR OP/ED): Performed by: PHYSICIAN ASSISTANT

## 2023-10-03 PROCEDURE — 82947 ASSAY GLUCOSE BLOOD QUANT: CPT

## 2023-10-03 PROCEDURE — 80048 BASIC METABOLIC PNL TOTAL CA: CPT | Performed by: INTERNAL MEDICINE

## 2023-10-03 PROCEDURE — 85025 COMPLETE CBC W/AUTO DIFF WBC: CPT | Performed by: INTERNAL MEDICINE

## 2023-10-03 PROCEDURE — 83735 ASSAY OF MAGNESIUM: CPT | Performed by: INTERNAL MEDICINE

## 2023-10-03 PROCEDURE — 36415 COLL VENOUS BLD VENIPUNCTURE: CPT | Performed by: INTERNAL MEDICINE

## 2023-10-03 PROCEDURE — 2500000001 HC RX 250 WO HCPCS SELF ADMINISTERED DRUGS (ALT 637 FOR MEDICARE OP): Performed by: INTERNAL MEDICINE

## 2023-10-03 PROCEDURE — 2060000001 HC INTERMEDIATE ICU ROOM DAILY

## 2023-10-03 PROCEDURE — 99232 SBSQ HOSP IP/OBS MODERATE 35: CPT | Performed by: INTERNAL MEDICINE

## 2023-10-03 PROCEDURE — 2500000001 HC RX 250 WO HCPCS SELF ADMINISTERED DRUGS (ALT 637 FOR MEDICARE OP): Performed by: PHYSICIAN ASSISTANT

## 2023-10-03 PROCEDURE — 2500000002 HC RX 250 W HCPCS SELF ADMINISTERED DRUGS (ALT 637 FOR MEDICARE OP, ALT 636 FOR OP/ED): Performed by: INTERNAL MEDICINE

## 2023-10-03 PROCEDURE — 99232 SBSQ HOSP IP/OBS MODERATE 35: CPT | Performed by: PHYSICIAN ASSISTANT

## 2023-10-03 PROCEDURE — 99233 SBSQ HOSP IP/OBS HIGH 50: CPT | Performed by: INTERNAL MEDICINE

## 2023-10-03 PROCEDURE — 2500000001 HC RX 250 WO HCPCS SELF ADMINISTERED DRUGS (ALT 637 FOR MEDICARE OP): Performed by: STUDENT IN AN ORGANIZED HEALTH CARE EDUCATION/TRAINING PROGRAM

## 2023-10-03 PROCEDURE — 2500000005 HC RX 250 GENERAL PHARMACY W/O HCPCS: Performed by: STUDENT IN AN ORGANIZED HEALTH CARE EDUCATION/TRAINING PROGRAM

## 2023-10-03 PROCEDURE — C9113 INJ PANTOPRAZOLE SODIUM, VIA: HCPCS | Performed by: PHYSICIAN ASSISTANT

## 2023-10-03 RX ORDER — LOSARTAN POTASSIUM 25 MG/1
25 TABLET ORAL DAILY
Status: DISCONTINUED | OUTPATIENT
Start: 2023-10-03 | End: 2023-10-04 | Stop reason: HOSPADM

## 2023-10-03 RX ORDER — ATORVASTATIN CALCIUM 40 MG/1
80 TABLET, FILM COATED ORAL DAILY
Status: DISCONTINUED | OUTPATIENT
Start: 2023-10-04 | End: 2023-10-04 | Stop reason: HOSPADM

## 2023-10-03 RX ADMIN — Medication 3 MG: at 19:53

## 2023-10-03 RX ADMIN — TRAMADOL HYDROCHLORIDE 50 MG: 50 TABLET, COATED ORAL at 10:08

## 2023-10-03 RX ADMIN — LIDOCAINE 1 PATCH: 4 PATCH TOPICAL at 10:07

## 2023-10-03 RX ADMIN — CARVEDILOL 3.12 MG: 3.12 TABLET, FILM COATED ORAL at 17:00

## 2023-10-03 RX ADMIN — FERROUS SULFATE TAB 325 MG (65 MG ELEMENTAL FE) 65 MG OF IRON: 325 (65 FE) TAB at 10:08

## 2023-10-03 RX ADMIN — TRAMADOL HYDROCHLORIDE 50 MG: 50 TABLET, COATED ORAL at 20:02

## 2023-10-03 RX ADMIN — ACETAMINOPHEN 650 MG: 325 TABLET, FILM COATED ORAL at 19:53

## 2023-10-03 RX ADMIN — OXYCODONE HYDROCHLORIDE 5 MG: 5 TABLET ORAL at 06:29

## 2023-10-03 RX ADMIN — OXYCODONE HYDROCHLORIDE 5 MG: 5 TABLET ORAL at 23:27

## 2023-10-03 RX ADMIN — INSULIN LISPRO 2 UNITS: 100 INJECTION, SOLUTION INTRAVENOUS; SUBCUTANEOUS at 17:04

## 2023-10-03 RX ADMIN — PANTOPRAZOLE SODIUM 40 MG: 40 INJECTION, POWDER, FOR SOLUTION INTRAVENOUS at 06:30

## 2023-10-03 RX ADMIN — FOLIC ACID 1 MG: 1 TABLET ORAL at 10:08

## 2023-10-03 RX ADMIN — CYANOCOBALAMIN TAB 1000 MCG 1000 MCG: 1000 TAB at 10:07

## 2023-10-03 RX ADMIN — CARVEDILOL 3.12 MG: 3.12 TABLET, FILM COATED ORAL at 10:08

## 2023-10-03 RX ADMIN — LOSARTAN POTASSIUM 25 MG: 25 TABLET, FILM COATED ORAL at 10:09

## 2023-10-03 RX ADMIN — ACETAMINOPHEN 650 MG: 325 TABLET, FILM COATED ORAL at 06:29

## 2023-10-03 RX ADMIN — ASPIRIN 81 MG CHEWABLE TABLET 81 MG: 81 TABLET CHEWABLE at 10:09

## 2023-10-03 ASSESSMENT — COGNITIVE AND FUNCTIONAL STATUS - GENERAL
MOVING TO AND FROM BED TO CHAIR: A LOT
PERSONAL GROOMING: A LOT
WALKING IN HOSPITAL ROOM: A LITTLE
DRESSING REGULAR UPPER BODY CLOTHING: A LOT
PERSONAL GROOMING: A LITTLE
STANDING UP FROM CHAIR USING ARMS: A LOT
TURNING FROM BACK TO SIDE WHILE IN FLAT BAD: A LITTLE
DAILY ACTIVITIY SCORE: 13
TURNING FROM BACK TO SIDE WHILE IN FLAT BAD: A LITTLE
MOBILITY SCORE: 12
MOVING FROM LYING ON BACK TO SITTING ON SIDE OF FLAT BED WITH BEDRAILS: A LITTLE
MOVING FROM LYING ON BACK TO SITTING ON SIDE OF FLAT BED WITH BEDRAILS: A LITTLE
DAILY ACTIVITIY SCORE: 18
EATING MEALS: A LITTLE
STANDING UP FROM CHAIR USING ARMS: A LITTLE
HELP NEEDED FOR BATHING: A LOT
TOILETING: A LITTLE
TOILETING: A LOT
CLIMB 3 TO 5 STEPS WITH RAILING: TOTAL
MOBILITY SCORE: 18
DRESSING REGULAR LOWER BODY CLOTHING: A LOT
DRESSING REGULAR LOWER BODY CLOTHING: A LITTLE
WALKING IN HOSPITAL ROOM: TOTAL
MOVING TO AND FROM BED TO CHAIR: A LITTLE
DRESSING REGULAR UPPER BODY CLOTHING: A LITTLE
CLIMB 3 TO 5 STEPS WITH RAILING: A LITTLE
EATING MEALS: A LITTLE
HELP NEEDED FOR BATHING: A LITTLE

## 2023-10-03 ASSESSMENT — PAIN SCALES - GENERAL
PAINLEVEL_OUTOF10: 8
PAINLEVEL_OUTOF10: 6
PAINLEVEL_OUTOF10: 5 - MODERATE PAIN
PAINLEVEL_OUTOF10: 5 - MODERATE PAIN
PAINLEVEL_OUTOF10: 6

## 2023-10-03 NOTE — PROGRESS NOTES
[facility]    Patient Name: Santana Mayo  Patient : 1960  Room/Bed: -A    Date: 10/03/23  Time: 8:59 AM    Subjective:    Patient reports feeling well this morning without any new cardiac problems or complaints.  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  In discussion with interventional cardiology it was recommended patient undergo outpatient bypass evaluation.  Patient otherwise feels well today without any new cardiac problems or complaints.  Telemetry showing sinus rhythm with heart rate in the 60s.    Assessment/Plan:   1.  NSTEMI  Patient presented with atypical symptoms of shakiness, diaphoresis, mild shortness of breath as described in HPI.  Subsequently found to have elevated troponin of 135-114-246.  ECG showed sinus rhythm with a heart rate of 69 bpm no significant ST segment abnormality.  Echocardiogram done in 2023 showed ejection fraction 60%, normal right ventricular systolic function, mild aortic valve stenosis with a mean pressure gradient of 9.0 mmHg and a dimensionless index of 0.58.  We will check limited echocardiogram  Patient has been placed on aspirin therapy. I will place the patient on a heparin infusion.  I discussed possible cardiac catheterization with the patient which she stated he would need to consider further.  We will keep n.p.o. for possible heart cath tomorrow.    10/2: Patient have left heart catheterization today.  Patient has been NPO.    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  In discussion with interventional cardiology it was recommended patient undergo outpatient bypass evaluation.  I discussed with the patient possible inpatient versus outpatient evaluation for bypass.  Patient states he would like to go home.  Patient otherwise feels  well today without any new cardiac problems or complaints.  We will add losartan for additional blood pressure reduction.  Patient should otherwise continue his current medical therapy.  Echocardiogram completed yesterday although on report still pending.  On my review of echocardiogram LVEF appears approximately 50%.    2.  Hypertension  Patient has a history of hypertension which currently appears well controlled.  Patient is to be restarted on his home antihypertensive medical therapy    10/2: We will add low-dose carvedilol for additional blood pressure reduction.    10/3: Blood pressure improved today although still suboptimally controlled.  Will add losartan for additional blood pressure reduction.    3.  Dyslipidemia  Patient should be restarted on statin therapy    10/2: Add statin therapy.    10/3: Will increase atorvastatin 80 mg daily given findings on cardiac catheterization.    4.  Diabetes mellitus  Management per hospitalist service    10/3: Stable, management per endocrinology and hospitalist service.    5.  Anemia  Hemoglobin of 8.1 this morning.  We will monitor with the addition of a heparin infusion.    10/3: Hemoglobin 8.2 this morning.    6.  Aortic stenosis  Echocardiogram done in January 2023 showed ejection fraction 60%, normal right ventricular systolic function, mild aortic valve stenosis with a mean pressure gradient of 9.0 mmHg and a dimensionless index of 0.58.  Monitor clinically for now.      Past Medical History:  He has a past medical history of CVA (cerebral vascular accident) (CMS/[facility]), DM (diabetes mellitus) (CMS/[facility]), HLD (hyperlipidemia), and HTN (hypertension).    Past Surgical History:  He has a past surgical history that includes CT angio abdomen pelvis w and or wo IV IV contrast (1/30/2023).      Social History:  He reports that he has quit smoking. His smoking use included cigarettes. He has never used smokeless tobacco. He reports current alcohol use of about 6.0 standard  drinks of alcohol per week. He reports that he does not use drugs.    Family History:  No family history on file.     Allergies:  Penicillins and Penicillin v    Outpatient Medications:  No current outpatient medications     ROS:  A 14 point review of systems was done and is negative other than as stated in HPI    Vitals:  Vitals:    10/02/23 2300 10/02/23 2352 10/03/23 0342 10/03/23 0849   BP: 159/82 131/76 147/67 (!) 144/100   BP Location:    Right arm   Patient Position:    Lying   Pulse: 72 62 59 93   Resp: 16 16 14 16   Temp:  36.7 °C (98.1 °F) 37 °C (98.6 °F) 36.7 °C (98 °F)   TempSrc:  Temporal  Temporal   SpO2: 94% 96% 92% 97%   Weight:   89.6 kg (197 lb 8.5 oz)    Height:           Physical Exam:     Constitutional: Cooperative, in no acute distress, alert, appears stated age.  Skin: Skin color, texture, turgor normal. No rashes or lesions.  Head: Normocephalic. No masses, lesions, tenderness or abnormalities  Eyes: Extraocular movements are grossly intact.  Mouth and throat: Mucous membranes moist  Neck: Neck supple, no carotid bruits, no JVD  Respiratory: Lungs clear to auscultation, no wheezing or rhonchi, no use of accessory muscles  Chest wall: No scars, normal excursion with respiration  Cardiovascular: Regular rhythm with + murmur  Gastrointestinal: Abdomen soft, nontender. Bowel sounds normal.  Musculoskeletal: Strength equal in upper extremities  Extremities: No pitting edema  Neurologic: Sensation grossly intact, alert and oriented ×3    Intake/Output:   I/O last 2 completed shifts:  In: 300 (3.3 mL/kg) [P.O.:200; IV Piggyback:100]  Out: 1110 (12.4 mL/kg) [Urine:1100 (0.5 mL/kg/hr); Blood:10]  Weight: 89.6 kg     Outpatient Medications  No current facility-administered medications on file prior to encounter.     No current outpatient medications on file prior to encounter.       Scheduled medications  aspirin, 81 mg, oral, Daily  atorvastatin, 40 mg, oral, Daily  carvedilol, 3.125 mg, oral, BID  with meals  cyanocobalamin, 1,000 mcg, oral, Daily  ferrous sulfate, 65 mg of iron, oral, Daily with breakfast  folic acid, 1 mg, oral, Daily  insulin lispro, 0-10 Units, subcutaneous, TID with meals  lidocaine, 1 patch, transdermal, Daily  oxymetazoline, 2 spray, Each Nostril, BID  pantoprazole, 40 mg, intravenous, Daily before breakfast  polyethylene glycol, 17 g, oral, Daily  traMADol, 50 mg, oral, BID      Continuous medications  [Held by provider] heparin, 0-4,000 Units/hr, Last Rate: 1,200 Units/hr (10/02/23 0251)    PRN medications  PRN medications: acetaminophen, acetaminophen **OR** [DISCONTINUED] acetaminophen **OR** acetaminophen, dextrose, dextrose, glucagon, [Held by provider] heparin, melatonin, ondansetron, oxyCODONE   No medications prior to admission.       Recent Labs: (past 2 days)  Recent Results (from the past 48 hour(s))   Heparin Assay, UFH    Collection Time: 10/01/23 12:15 PM   Result Value Ref Range    Heparin Unfractionated <0.1 See Comment Below for Therapeutic Ranges IU/mL   aPTT - baseline    Collection Time: 10/01/23 12:15 PM   Result Value Ref Range    aPTT 31 27 - 38 seconds   Platelet count - baseline    Collection Time: 10/01/23 12:15 PM   Result Value Ref Range    Platelets 268 150 - 450 x10*3/uL   POCT GLUCOSE    Collection Time: 10/01/23 12:46 PM   Result Value Ref Range    POCT Glucose 234 (H) 74 - 99 mg/dL   POCT GLUCOSE    Collection Time: 10/01/23  4:13 PM   Result Value Ref Range    POCT Glucose 210 (H) 74 - 99 mg/dL   POCT GLUCOSE    Collection Time: 10/01/23  7:48 PM   Result Value Ref Range    POCT Glucose 199 (H) 74 - 99 mg/dL   Platelet count - HIT surveillance    Collection Time: 10/02/23  4:46 AM   Result Value Ref Range    Platelets 283 150 - 450 x10*3/uL   Magnesium    Collection Time: 10/02/23  4:46 AM   Result Value Ref Range    Magnesium 1.76 1.60 - 2.40 mg/dL   Basic Metabolic Panel    Collection Time: 10/02/23  4:46 AM   Result Value Ref Range    Glucose 112  (H) 74 - 99 mg/dL    Sodium 136 136 - 145 mmol/L    Potassium 3.8 3.5 - 5.3 mmol/L    Chloride 105 98 - 107 mmol/L    Bicarbonate 25 21 - 32 mmol/L    Anion Gap 10 10 - 20 mmol/L    Urea Nitrogen 13 6 - 23 mg/dL    Creatinine 0.83 0.50 - 1.30 mg/dL    eGFR >90 >60 mL/min/1.73m*2    Calcium 8.9 8.6 - 10.3 mg/dL   CBC and Auto Differential    Collection Time: 10/02/23  4:46 AM   Result Value Ref Range    WBC 7.8 4.4 - 11.3 x10*3/uL    nRBC 0.0 0.0 - 0.0 /100 WBCs    RBC 3.05 (L) 4.50 - 5.90 x10*6/uL    Hemoglobin 8.6 (L) 13.5 - 17.5 g/dL    Hematocrit 26.8 (L) 41.0 - 52.0 %    MCV 88 80 - 100 fL    MCH 28.2 26.0 - 34.0 pg    MCHC 32.1 32.0 - 36.0 g/dL    RDW 17.6 (H) 11.5 - 14.5 %    Platelets 283 150 - 450 x10*3/uL    MPV 9.4 7.5 - 11.5 fL    Neutrophils % 62.8 40.0 - 80.0 %    Immature Granulocytes %, Automated 0.4 0.0 - 0.9 %    Lymphocytes % 22.6 13.0 - 44.0 %    Monocytes % 8.6 2.0 - 10.0 %    Eosinophils % 5.0 0.0 - 6.0 %    Basophils % 0.6 0.0 - 2.0 %    Neutrophils Absolute 4.90 1.20 - 7.70 x10*3/uL    Immature Granulocytes Absolute, Automated 0.03 0.00 - 0.70 x10*3/uL    Lymphocytes Absolute 1.76 1.20 - 4.80 x10*3/uL    Monocytes Absolute 0.67 0.10 - 1.00 x10*3/uL    Eosinophils Absolute 0.39 0.00 - 0.70 x10*3/uL    Basophils Absolute 0.05 0.00 - 0.10 x10*3/uL   Iron and TIBC    Collection Time: 10/02/23  4:46 AM   Result Value Ref Range    Iron 41 35 - 150 ug/dL    UIBC 375 (H) 110 - 370 ug/dL    TIBC 416 240 - 445 ug/dL    % Saturation 10 (L) 25 - 45 %   Vitamin B12    Collection Time: 10/02/23  4:46 AM   Result Value Ref Range    Vitamin B12 286 211 - 911 pg/mL   POCT GLUCOSE    Collection Time: 10/02/23  8:36 AM   Result Value Ref Range    POCT Glucose 134 (H) 74 - 99 mg/dL   POCT GLUCOSE    Collection Time: 10/02/23 11:03 AM   Result Value Ref Range    POCT Glucose 146 (H) 74 - 99 mg/dL   POCT GLUCOSE    Collection Time: 10/02/23  3:12 PM   Result Value Ref Range    POCT Glucose 132 (H) 74 - 99 mg/dL    POCT GLUCOSE    Collection Time: 10/02/23  8:59 PM   Result Value Ref Range    POCT Glucose 125 (H) 74 - 99 mg/dL   Magnesium    Collection Time: 10/03/23  3:28 AM   Result Value Ref Range    Magnesium 1.79 1.60 - 2.40 mg/dL   Basic Metabolic Panel    Collection Time: 10/03/23  3:28 AM   Result Value Ref Range    Glucose 90 74 - 99 mg/dL    Sodium 134 (L) 136 - 145 mmol/L    Potassium 3.9 3.5 - 5.3 mmol/L    Chloride 105 98 - 107 mmol/L    Bicarbonate 21 21 - 32 mmol/L    Anion Gap 12 10 - 20 mmol/L    Urea Nitrogen 13 6 - 23 mg/dL    Creatinine 0.86 0.50 - 1.30 mg/dL    eGFR >90 >60 mL/min/1.73m*2    Calcium 8.7 8.6 - 10.3 mg/dL   CBC and Auto Differential    Collection Time: 10/03/23  3:28 AM   Result Value Ref Range    WBC 7.6 4.4 - 11.3 x10*3/uL    nRBC 0.0 0.0 - 0.0 /100 WBCs    RBC 2.98 (L) 4.50 - 5.90 x10*6/uL    Hemoglobin 8.2 (L) 13.5 - 17.5 g/dL    Hematocrit 26.2 (L) 41.0 - 52.0 %    MCV 88 80 - 100 fL    MCH 27.5 26.0 - 34.0 pg    MCHC 31.3 (L) 32.0 - 36.0 g/dL    RDW 17.2 (H) 11.5 - 14.5 %    Platelets 276 150 - 450 x10*3/uL    MPV 9.2 7.5 - 11.5 fL    Neutrophils % 63.0 40.0 - 80.0 %    Immature Granulocytes %, Automated 0.3 0.0 - 0.9 %    Lymphocytes % 20.5 13.0 - 44.0 %    Monocytes % 9.3 2.0 - 10.0 %    Eosinophils % 6.2 0.0 - 6.0 %    Basophils % 0.7 0.0 - 2.0 %    Neutrophils Absolute 4.81 1.20 - 7.70 x10*3/uL    Immature Granulocytes Absolute, Automated 0.02 0.00 - 0.70 x10*3/uL    Lymphocytes Absolute 1.56 1.20 - 4.80 x10*3/uL    Monocytes Absolute 0.71 0.10 - 1.00 x10*3/uL    Eosinophils Absolute 0.47 0.00 - 0.70 x10*3/uL    Basophils Absolute 0.05 0.00 - 0.10 x10*3/uL       ECG  No results found for this or any previous visit (from the past 4464 hour(s)).    Echocardiogram  No results found for this or any previous visit from the past 1095 days.        Umesh Newby DO, FACC, FACOI  10/3/2023  8:59 AM

## 2023-10-03 NOTE — CARE PLAN
Problem: Skin  Goal: Participates in plan/prevention/treatment measures  Outcome: Progressing  Goal: Prevent/manage excess moisture  Outcome: Progressing  Goal: Prevent/minimize sheer/friction injuries  Outcome: Progressing  Goal: Promote/optimize nutrition  Outcome: Progressing     Problem: General Stroke  Goal: Out of bed three times today  Outcome: Progressing     Problem: Skin  Goal: Decreased wound size/increased tissue granulation at next dressing change  Outcome: Met  Goal: Promote skin healing  Outcome: Met     Problem: General Stroke  Goal: Demonstrate improvement in neurological exam throughout the shift  Outcome: Met  Goal: Maintain BP within ordered limits throughout shift  Outcome: Met  Goal: Participate in treatment (ie., meds, therapy) throughout shift  Outcome: Met  Goal: No symptoms of aspiration throughout shift  Outcome: Met  Goal: No symptoms of hemorrhage throughout shift  Outcome: Met  Goal: Decreased nausea/vomiting throughout shift  Outcome: Met  Goal: Controlled blood glucose throughout shift  Outcome: Met     Problem: ICU Stroke  Goal: Maintain ICP within ordered limits throughout shift  Outcome: Met  Goal: Tolerate EVD clamping trial throughout shift  Outcome: Met  Goal: Tolerate ventilator weaning trial during shift  Outcome: Met  Goal: Maintain patent airway throughout shift  Outcome: Met  Goal: Achieve/maintain targeted sodium level throughout shift  Outcome: Met   The patient's goals for the shift include      The clinical goals for the shift include patient will have stable cath sites    Over the shift, the patient did not make progress toward the following goals. Barriers to progression include infection potential. Recommendations to address these barriers include monitoring cath sites.

## 2023-10-03 NOTE — CARE PLAN
Problem: Skin  Goal: Decreased wound size/increased tissue granulation at next dressing change  Outcome: Progressing  Goal: Participates in plan/prevention/treatment measures  Outcome: Progressing  Goal: Prevent/manage excess moisture  Outcome: Progressing  Goal: Prevent/minimize sheer/friction injuries  Outcome: Progressing  Goal: Promote/optimize nutrition  Outcome: Progressing  Goal: Promote skin healing  Outcome: Progressing     Problem: General Stroke  Goal: Demonstrate improvement in neurological exam throughout the shift  Outcome: Progressing  Goal: Maintain BP within ordered limits throughout shift  Outcome: Progressing  Goal: Participate in treatment (ie., meds, therapy) throughout shift  Outcome: Progressing  Goal: No symptoms of aspiration throughout shift  Outcome: Progressing  Goal: No symptoms of hemorrhage throughout shift  Outcome: Progressing  Goal: Tolerate enteral feeding throughout shift  Outcome: Progressing  Goal: Decreased nausea/vomiting throughout shift  Outcome: Progressing  Goal: Controlled blood glucose throughout shift  Outcome: Progressing  Goal: Out of bed three times today  Outcome: Progressing     Problem: ICU Stroke  Goal: Maintain ICP within ordered limits throughout shift  Outcome: Progressing  Goal: Tolerate EVD clamping trial throughout shift  Outcome: Progressing  Goal: Tolerate ventilator weaning trial during shift  Outcome: Progressing  Goal: Maintain patent airway throughout shift  Outcome: Progressing  Goal: Achieve/maintain targeted sodium level throughout shift  Outcome: Progressing   The patient's goals for the shift include      The clinical goals for the shift include patient will follow ADA diet    Over the shift, the patient did not make progress toward the following goals. Barriers to progression include . Recommendations to address these barriers include .

## 2023-10-03 NOTE — PROGRESS NOTES
"Santana Mayo is a 63 y.o. male on day 3 of admission presenting with Troponin level elevated.    Subjective   Glycemic control has been reviewed.  Patient received the news yesterday that he had multivessel disease and will need to be assessed for CABG.      He had a wound dressing placed to his great toe.       Objective     Physical Exam  Constitutional:       General: He is not in acute distress.     Appearance: Normal appearance. He is normal weight.   HENT:      Head: Normocephalic and atraumatic.      Nose: Nose normal.      Mouth/Throat:      Mouth: Mucous membranes are moist.   Eyes:      Extraocular Movements: Extraocular movements intact.   Cardiovascular:      Rate and Rhythm: Normal rate and regular rhythm.   Pulmonary:      Effort: Pulmonary effort is normal.      Breath sounds: Normal breath sounds.   Abdominal:      General: Bowel sounds are normal.      Palpations: Abdomen is soft.   Musculoskeletal:         General: Normal range of motion.      Comments: Dressing clean and dry to great toe    Skin:     General: Skin is warm.   Neurological:      Mental Status: He is alert and oriented to person, place, and time.   Psychiatric:         Mood and Affect: Mood normal.         Last Recorded Vitals  Blood pressure 147/67, pulse 59, temperature 37 °C (98.6 °F), resp. rate 14, height 1.803 m (5' 11\"), weight 89.6 kg (197 lb 8.5 oz), SpO2 92 %.  Intake/Output last 3 Shifts:  I/O last 3 completed shifts:  In: 900 (10 mL/kg) [P.O.:800; IV Piggyback:100]  Out: 1110 (12.4 mL/kg) [Urine:1100 (0.3 mL/kg/hr); Blood:10]  Weight: 89.6 kg     Relevant Results  Scheduled medications  aspirin, 81 mg, oral, Daily  atorvastatin, 40 mg, oral, Daily  carvedilol, 3.125 mg, oral, BID with meals  cyanocobalamin, 1,000 mcg, oral, Daily  ferrous sulfate, 65 mg of iron, oral, Daily with breakfast  folic acid, 1 mg, oral, Daily  insulin lispro, 0-10 Units, subcutaneous, TID with meals  lidocaine, 1 patch, transdermal, " Daily  oxymetazoline, 2 spray, Each Nostril, BID  pantoprazole, 40 mg, intravenous, Daily before breakfast  polyethylene glycol, 17 g, oral, Daily  traMADol, 50 mg, oral, BID      Continuous medications  [Held by provider] heparin, 0-4,000 Units/hr, Last Rate: 1,200 Units/hr (10/02/23 0251)      PRN medications  PRN medications: acetaminophen, acetaminophen **OR** [DISCONTINUED] acetaminophen **OR** acetaminophen, dextrose, dextrose, glucagon, [Held by provider] heparin, melatonin, ondansetron, oxyCODONE    Results for orders placed or performed during the hospital encounter of 09/30/23 (from the past 24 hour(s))   POCT GLUCOSE   Result Value Ref Range    POCT Glucose 134 (H) 74 - 99 mg/dL   POCT GLUCOSE   Result Value Ref Range    POCT Glucose 146 (H) 74 - 99 mg/dL   POCT GLUCOSE   Result Value Ref Range    POCT Glucose 132 (H) 74 - 99 mg/dL   POCT GLUCOSE   Result Value Ref Range    POCT Glucose 125 (H) 74 - 99 mg/dL   Magnesium   Result Value Ref Range    Magnesium 1.79 1.60 - 2.40 mg/dL   Basic Metabolic Panel   Result Value Ref Range    Glucose 90 74 - 99 mg/dL    Sodium 134 (L) 136 - 145 mmol/L    Potassium 3.9 3.5 - 5.3 mmol/L    Chloride 105 98 - 107 mmol/L    Bicarbonate 21 21 - 32 mmol/L    Anion Gap 12 10 - 20 mmol/L    Urea Nitrogen 13 6 - 23 mg/dL    Creatinine 0.86 0.50 - 1.30 mg/dL    eGFR >90 >60 mL/min/1.73m*2    Calcium 8.7 8.6 - 10.3 mg/dL   CBC and Auto Differential   Result Value Ref Range    WBC 7.6 4.4 - 11.3 x10*3/uL    nRBC 0.0 0.0 - 0.0 /100 WBCs    RBC 2.98 (L) 4.50 - 5.90 x10*6/uL    Hemoglobin 8.2 (L) 13.5 - 17.5 g/dL    Hematocrit 26.2 (L) 41.0 - 52.0 %    MCV 88 80 - 100 fL    MCH 27.5 26.0 - 34.0 pg    MCHC 31.3 (L) 32.0 - 36.0 g/dL    RDW 17.2 (H) 11.5 - 14.5 %    Platelets 276 150 - 450 x10*3/uL    MPV 9.2 7.5 - 11.5 fL    Neutrophils % 63.0 40.0 - 80.0 %    Immature Granulocytes %, Automated 0.3 0.0 - 0.9 %    Lymphocytes % 20.5 13.0 - 44.0 %    Monocytes % 9.3 2.0 - 10.0 %     Eosinophils % 6.2 0.0 - 6.0 %    Basophils % 0.7 0.0 - 2.0 %    Neutrophils Absolute 4.81 1.20 - 7.70 x10*3/uL    Immature Granulocytes Absolute, Automated 0.02 0.00 - 0.70 x10*3/uL    Lymphocytes Absolute 1.56 1.20 - 4.80 x10*3/uL    Monocytes Absolute 0.71 0.10 - 1.00 x10*3/uL    Eosinophils Absolute 0.47 0.00 - 0.70 x10*3/uL    Basophils Absolute 0.05 0.00 - 0.10 x10*3/uL      CT brain attack head wo IV contrast    Result Date: 10/2/2023  Interpreted By:  Schoenberger, Joseph, STUDY: CT BRAIN ATTACK HEAD WO IV CONTRAST;  9/30/2023 8:29 am   INDICATION: Signs/Symptoms:stroke alert.   COMPARISON: None.   ACCESSION NUMBER(S): [id number]   ORDERING CLINICIAN: RUDOLPH SCHULTZ   TECHNIQUE: Noncontrast axial CT scan of head was performed. Angled reformats in brain and bone windows were generated. The images were reviewed in bone, brain, blood and soft tissue windows.   FINDINGS: CSF Spaces: Enlarged due to parenchymal volume loss. Normal configuration intact basal cisterns. There is no extraaxial fluid collection.   Parenchyma: Periventricular deep white matter hypoattenuation consistent with small vessel ischemic white matter demyelination. Remote infarct extending from the posterior right frontal deep white matter into the right basal ganglia. Stroke alert the grey-white differentiation is intact. There is no mass effect or midline shift. There is no intracranial hemorrhage.   Calvarium: The calvarium is unremarkable.   Paranasal sinuses and mastoids: Visualized paranasal sinuses and mastoids are clear.       No evidence of acute cortical infarct or intracranial hemorrhage.   Atrophy and chronic small vessel ischemic white matter demyelination. Remote deep white matter/basal ganglia infarct right hemisphere.   MACRO: .  No definite acute finding. Atrophy and chronic ischemic white matter demyelination. Joseph Schoenberger discussed the significance and urgency of this critical finding by telephone with  RUDOLPH  ANTOINE on 9/30/2023 at 8:38 am.  (**-RCF-**) Findings:  See findings.     Signed by: Joseph Schoenberger 9/30/2023 8:41 AM Dictation workstation:   [id number]        Assessment/Plan   Principal Problem:    Troponin level elevated  Active Problems:    Type II diabetes mellitus with hypoglycemia (CMS/[facility])    Stroke (CMS/[facility])    HTN (hypertension)    HLD (hyperlipidemia)    B12 deficiency    Iron deficiency    NSTEMI (non-ST elevated myocardial infarction) (CMS/[facility])    Controlled type II DM with A1C of 5.6%  To continue insulin correction scale TID AC to start at 2 units   Diabetic diet   Accu-Cheks ACHS    Hypoglycemic protocol   To hold Metformin for 48 hours post cardiac cath  To discontinue Glimepiride use given A1C and hypoglycemic event   Counseled that thankfully he has good glycemic control in the event that he has to undergo CABG as good glycemic control will decrease the risk of impaired healing and postoperative infections  Will continue to follow        Enedelia Vieyra MD

## 2023-10-03 NOTE — PROGRESS NOTES
"Santana Mayo is a 63 y.o. male on day 3 of admission presenting with Troponin level elevated.    Subjective   Patient seen and examined. Complains of back pain. Denies dizziness, lightheadedness, CP, SOB, N/V, abdominal pain. No reports of melena or hematochezia.    10 point ROS obtained and negative other than discussed above.       Objective     Physical Exam  Vitals reviewed.   Constitutional:       General: He is not in acute distress.     Appearance: Normal appearance.   Cardiovascular:      Rate and Rhythm: Normal rate and regular rhythm.   Pulmonary:      Effort: Pulmonary effort is normal.      Breath sounds: Normal breath sounds.   Abdominal:      General: Bowel sounds are normal. There is no distension.      Palpations: Abdomen is soft.      Tenderness: There is no abdominal tenderness. There is no guarding or rebound.   Neurological:      General: No focal deficit present.      Mental Status: He is alert and oriented to person, place, and time.   Psychiatric:         Mood and Affect: Mood normal.         Behavior: Behavior normal.         Last Recorded Vitals  Blood pressure (!) 144/100, pulse 93, temperature 36.7 °C (98 °F), temperature source Temporal, resp. rate 16, height 1.803 m (5' 11\"), weight 89.6 kg (197 lb 8.5 oz), SpO2 97 %.  Intake/Output last 3 Shifts:  I/O last 3 completed shifts:  In: 900 (10 mL/kg) [P.O.:800; IV Piggyback:100]  Out: 1110 (12.4 mL/kg) [Urine:1100 (0.3 mL/kg/hr); Blood:10]  Weight: 89.6 kg     Relevant Results      Scheduled medications  aspirin, 81 mg, oral, Daily  [START ON 10/4/2023] atorvastatin, 80 mg, oral, Daily  carvedilol, 3.125 mg, oral, BID with meals  cyanocobalamin, 1,000 mcg, oral, Daily  ferrous sulfate, 65 mg of iron, oral, Daily with breakfast  folic acid, 1 mg, oral, Daily  insulin lispro, 0-10 Units, subcutaneous, TID with meals  lidocaine, 1 patch, transdermal, Daily  losartan, 25 mg, oral, Daily  oxymetazoline, 2 spray, Each Nostril, BID  pantoprazole, " 40 mg, intravenous, Daily before breakfast  perflutren lipid microspheres, 0.5 mL, intravenous, Once in imaging  polyethylene glycol, 17 g, oral, Daily  sulfur hexafluoride microsphr, 2 mL, intravenous, Once in imaging  traMADol, 50 mg, oral, BID      Continuous medications  [Held by provider] heparin, 0-4,000 Units/hr, Last Rate: 1,200 Units/hr (10/02/23 0251)      PRN medications  PRN medications: acetaminophen, acetaminophen **OR** [DISCONTINUED] acetaminophen **OR** acetaminophen, dextrose, dextrose, glucagon, [Held by provider] heparin, melatonin, ondansetron, oxyCODONE    Results for orders placed or performed during the hospital encounter of 09/30/23 (from the past 24 hour(s))   POCT GLUCOSE   Result Value Ref Range    POCT Glucose 132 (H) 74 - 99 mg/dL   POCT GLUCOSE   Result Value Ref Range    POCT Glucose 125 (H) 74 - 99 mg/dL   Magnesium   Result Value Ref Range    Magnesium 1.79 1.60 - 2.40 mg/dL   Basic Metabolic Panel   Result Value Ref Range    Glucose 90 74 - 99 mg/dL    Sodium 134 (L) 136 - 145 mmol/L    Potassium 3.9 3.5 - 5.3 mmol/L    Chloride 105 98 - 107 mmol/L    Bicarbonate 21 21 - 32 mmol/L    Anion Gap 12 10 - 20 mmol/L    Urea Nitrogen 13 6 - 23 mg/dL    Creatinine 0.86 0.50 - 1.30 mg/dL    eGFR >90 >60 mL/min/1.73m*2    Calcium 8.7 8.6 - 10.3 mg/dL   CBC and Auto Differential   Result Value Ref Range    WBC 7.6 4.4 - 11.3 x10*3/uL    nRBC 0.0 0.0 - 0.0 /100 WBCs    RBC 2.98 (L) 4.50 - 5.90 x10*6/uL    Hemoglobin 8.2 (L) 13.5 - 17.5 g/dL    Hematocrit 26.2 (L) 41.0 - 52.0 %    MCV 88 80 - 100 fL    MCH 27.5 26.0 - 34.0 pg    MCHC 31.3 (L) 32.0 - 36.0 g/dL    RDW 17.2 (H) 11.5 - 14.5 %    Platelets 276 150 - 450 x10*3/uL    MPV 9.2 7.5 - 11.5 fL    Neutrophils % 63.0 40.0 - 80.0 %    Immature Granulocytes %, Automated 0.3 0.0 - 0.9 %    Lymphocytes % 20.5 13.0 - 44.0 %    Monocytes % 9.3 2.0 - 10.0 %    Eosinophils % 6.2 0.0 - 6.0 %    Basophils % 0.7 0.0 - 2.0 %    Neutrophils Absolute  4.81 1.20 - 7.70 x10*3/uL    Immature Granulocytes Absolute, Automated 0.02 0.00 - 0.70 x10*3/uL    Lymphocytes Absolute 1.56 1.20 - 4.80 x10*3/uL    Monocytes Absolute 0.71 0.10 - 1.00 x10*3/uL    Eosinophils Absolute 0.47 0.00 - 0.70 x10*3/uL    Basophils Absolute 0.05 0.00 - 0.10 x10*3/uL   POCT GLUCOSE   Result Value Ref Range    POCT Glucose 103 (H) 74 - 99 mg/dL       CT brain attack head wo IV contrast    Result Date: 10/2/2023  No evidence of acute cortical infarct or intracranial hemorrhage.   Atrophy and chronic small vessel ischemic white matter demyelination. Remote deep white matter/basal ganglia infarct right hemisphere.   MACRO: .  No definite acute finding. Atrophy and chronic ischemic white matter demyelination. Joseph Schoenberger discussed the significance and urgency of this critical finding by telephone with  RUDOLPH SCHULTZ on 9/30/2023 at 8:38 am.  (**-RCF-**) Findings:  See findings.     Signed by: Joseph Schoenberger 9/30/2023 8:41 AM Dictation workstation:   [id number]    No specimens collected during this procedure.  Last relevant procedure:        This patient currently has cardiac telemetry ordered; if you would like to modify or discontinue the telemetry order, click here to go to the orders activity to modify/discontinue the order.                 Assessment/Plan   Principal Problem:    Troponin level elevated  Active Problems:    Type II diabetes mellitus with hypoglycemia (CMS/HCC)    Stroke (CMS/[facility])    HTN (hypertension)    HLD (hyperlipidemia)    B12 deficiency    Iron deficiency    NSTEMI (non-ST elevated myocardial infarction) (CMS/[facility])    Anemia  No overt GI bleeding. Patient admitted with concern for CVA and NSTEMI. In the absence of overt bleeding would not plan for additional GI evaluation at this time. Previously had had a duodenal ulcer and should remain on PPI therapy for this. He has never had a colonoscopy so an outpatient colonoscopy would be reasonable at a time  when it would be safe to hold any antiplatelet/anticoagulation. He did undergo cardiac catheterization yesterday and per post procedure note, severe left main artery stenosis and chronic total RCA occlusion seen. Notes mention cardiac surgery referral. He has had no signs of bleeding.  - monitor H&H and transfuse as needed  - continue home dose PPI  - follow up with GI clinic as outpatient.         Case was reviewed with Dr. Jimenez.      Lee Ann Chung PA-C

## 2023-10-03 NOTE — CONSULTS
Consults    Reason For Consult  Left hallux wound    History Of Present Illness  Santana Mayo is a 63 y.o. male who presented to the ED with stroke-like symptoms.  He was consulted to Podiatry for a left medial hallux wound.  Patient states that the wound has been present for about 1 week.  It occurred after bumping his foot on the side of his tub.  He states that he has a visiting nurse who has been helping to keep it clean and dressed.  Patient is a diabetic.  He denies following up with a podiatrist.  He denies N,V,C,F,SOB.      Past Medical History  He has a past medical history of CVA (cerebral vascular accident) (CMS/[facility]), DM (diabetes mellitus) (CMS/HCC), HLD (hyperlipidemia), and HTN (hypertension).    Surgical History  He has a past surgical history that includes CT angio abdomen pelvis w and or wo IV IV contrast (1/30/2023).     Social History  He reports that he has quit smoking. His smoking use included cigarettes. He has never used smokeless tobacco. He reports current alcohol use of about 6.0 standard drinks of alcohol per week. He reports that he does not use drugs.    Family History  No family history on file.     Allergies  Penicillins and Penicillin v    Review of Systems   Constitutional:  Negative for chills and fever.   HENT:  Negative for congestion and facial swelling.    Eyes:  Negative for pain and discharge.   Respiratory:  Negative for shortness of breath and wheezing.    Cardiovascular:  Negative for chest pain and leg swelling.   Gastrointestinal:  Negative for abdominal pain, nausea and vomiting.   Endocrine: Negative for cold intolerance and heat intolerance.   Skin:  Positive for wound.   Neurological:  Positive for weakness.   Psychiatric/Behavioral:  Negative for behavioral problems. The patient is not nervous/anxious.        Physical Exam:     Objective:   Vasc: DP and PT pulses are palpable bilateral.  CFT is less than 3 seconds bilateral.  Skin temperature is warm to cool  "proximal to distal bilateral.      Neuro:  Light touch and epicritic sensation is intact to the foot bilateral.       Derm: Ulcer present left medial hallux with a mixed fibrotic and granular base with an overlying scab. Mild erythema at twyla-wound. No communication with bone. No malodor.  Nails 1-5 bilateral are thick and elongated.   Skin is supple with normal texture and turgor noted.  Webspaces are clean, dry and intact bilateral.      Ortho:  [place] 2-5 b/l       Bedside debridement: Area was prepped and sharp excisional debridement of left hallux ulcer was performed down to and including the subcutaneous tissue using #15 blade. Non-viable tissue was removed, healthy bleeding tissue remained.  Scant purulence was noted during debridement.  No evidence of deep abscess or osteomyelitis . Hemostasis achieved with pressure. Adaptic, calcium alginate, and ricarda was applied.  Pre-debridement measurement: 0.3 x0.3 x 0.1 cm  Post debridement measurement: 0.5 x 0.5 x 0.1 cm.               Last Recorded Vitals  Blood pressure 159/82, pulse 72, temperature 36.6 °C (97.9 °F), temperature source Temporal, resp. rate 16, height 1.803 m (5' 11\"), weight 90.4 kg (199 lb 4.7 oz), SpO2 94 %.    Relevant Results  Results for orders placed or performed during the hospital encounter of 09/30/23 (from the past 24 hour(s))   Platelet count - HIT surveillance   Result Value Ref Range    Platelets 283 150 - 450 x10*3/uL   Magnesium   Result Value Ref Range    Magnesium 1.76 1.60 - 2.40 mg/dL   Basic Metabolic Panel   Result Value Ref Range    Glucose 112 (H) 74 - 99 mg/dL    Sodium 136 136 - 145 mmol/L    Potassium 3.8 3.5 - 5.3 mmol/L    Chloride 105 98 - 107 mmol/L    Bicarbonate 25 21 - 32 mmol/L    Anion Gap 10 10 - 20 mmol/L    Urea Nitrogen 13 6 - 23 mg/dL    Creatinine 0.83 0.50 - 1.30 mg/dL    eGFR >90 >60 mL/min/1.73m*2    Calcium 8.9 8.6 - 10.3 mg/dL   CBC and Auto Differential   Result Value Ref Range    WBC 7.8 4.4 - " 11.3 x10*3/uL    nRBC 0.0 0.0 - 0.0 /100 WBCs    RBC 3.05 (L) 4.50 - 5.90 x10*6/uL    Hemoglobin 8.6 (L) 13.5 - 17.5 g/dL    Hematocrit 26.8 (L) 41.0 - 52.0 %    MCV 88 80 - 100 fL    MCH 28.2 26.0 - 34.0 pg    MCHC 32.1 32.0 - 36.0 g/dL    RDW 17.6 (H) 11.5 - 14.5 %    Platelets 283 150 - 450 x10*3/uL    MPV 9.4 7.5 - 11.5 fL    Neutrophils % 62.8 40.0 - 80.0 %    Immature Granulocytes %, Automated 0.4 0.0 - 0.9 %    Lymphocytes % 22.6 13.0 - 44.0 %    Monocytes % 8.6 2.0 - 10.0 %    Eosinophils % 5.0 0.0 - 6.0 %    Basophils % 0.6 0.0 - 2.0 %    Neutrophils Absolute 4.90 1.20 - 7.70 x10*3/uL    Immature Granulocytes Absolute, Automated 0.03 0.00 - 0.70 x10*3/uL    Lymphocytes Absolute 1.76 1.20 - 4.80 x10*3/uL    Monocytes Absolute 0.67 0.10 - 1.00 x10*3/uL    Eosinophils Absolute 0.39 0.00 - 0.70 x10*3/uL    Basophils Absolute 0.05 0.00 - 0.10 x10*3/uL   Iron and TIBC   Result Value Ref Range    Iron 41 35 - 150 ug/dL    UIBC 375 (H) 110 - 370 ug/dL    TIBC 416 240 - 445 ug/dL    % Saturation 10 (L) 25 - 45 %   Vitamin B12   Result Value Ref Range    Vitamin B12 286 211 - 911 pg/mL   POCT GLUCOSE   Result Value Ref Range    POCT Glucose 134 (H) 74 - 99 mg/dL   POCT GLUCOSE   Result Value Ref Range    POCT Glucose 146 (H) 74 - 99 mg/dL   POCT GLUCOSE   Result Value Ref Range    POCT Glucose 132 (H) 74 - 99 mg/dL   POCT GLUCOSE   Result Value Ref Range    POCT Glucose 125 (H) 74 - 99 mg/dL        Assessment/Plan     Left hallux ulcer  DM type 2    - Initial patient evaluation and examination performed.  All findings discussed in detail and all questions answered to patient's satisfaction.   - Labs reviewed.  - Bedside debridement performed, description is in the exam portion of the note. Low suspicion for deep abscess and osteomyelitis.  - Will continue with local wound care. Dressing orders in place.   - Podiatry will continue to follow as needed.    - Patient to follow up in clinic upon discharge.          I  spent 20 minutes in the professional and overall care of this patient.

## 2023-10-03 NOTE — PROGRESS NOTES
Santana Mayo is a 63 y.o. male on day 3 of admission presenting with Troponin level elevated.    Subjective   Santana Mayo is a 63 y.o. male with PMH of prior stroke with residual left sided deficits, HLD, NIDDM II, who presents with concern for stroke.  States that he woke up this morning his vision was very blurred, unable to see anything effectively.  Presents life alert for EMS assistance.  Does report slurred speech upon EMS arrival.  Denies any headache, increased left-sided weakness from baseline, shortness of breath, chest pain, nausea, vomiting, diarrhea.  EMS checked his glucose levels, noted to be in the 60s and was given an amp of D50.  Patient denies any recent medication changes.  States he does not take any insulin.  States he has been taking his antidiabetic regimen as prescribed.  States his glucose levels have been well controlled recently.     ED course: HR 79, RR 20, /89, pulse ox 99% on room air.  No leukocytosis, Hgb 9.7.  Glucose 44 on arrival.  Chemistries remarkable for K5.4, troponin 135 -> 114.  CT head shows no acute intracranial process.  Given amp of D50 in ED with glucose increased to 200s.  Given hypoglycemia and elevated troponin decision made for admission.    10/1: Patient states he has not really ever had troubles with his sugars dropping.  It appears on reviewing his labs he has had some chronic anemia and his provider at primary care had asked him about dark or bloody stool.  The patient denies any bright red blood per stool but he is fairly anemic and his hemoglobin has been dropping especially in the last 24 hours.  Hemoglobin had dropped from 9.7-8.1.  We will be stopping heparin drip at this time.  Cardiology is recommending possibly cardiac cath and endocrinology notes they think this would be a good idea.  I suspect the patient did have a fairly primary hypoglycemic episode of unknown etiology that may have helped precipitate his type II MI.  Although it is  difficult to say.  The patient notes no acute issue the day before coming in that he is aware of.  He states he may not have eaten as well as he normally does not like before.  There may be more issues going on but his initial complaints of confusion and possible stroke were likely from hypoglycemia.  Do suspect he likely does have some significant coronary disease because of his ongoing diabetes etc.  We will continue to follow with endocrinology, cardiology and will also involve GI.    10/3: Patient was seen at bedside today and is feeling disappointed. Patient denies any shortness of breath or chest pain. Patient underwent heart catheterization, per post procedure note, severe left main artery stenosis and chronic total RCA occlusion seen. Improvement in blood pressure today, however Cardiology added Losartan for additional blood pressure reduction. Continue aspirin 81mg daily and atorvastatin 80mg daily. Cardiology recommends cardiac surgery evaluation for CABG. Patient had debridement of left medial hallux wound with podiatry yesterday. Patient had the wound on the side of his foot for about one week and occurred after bumping his foot on the side of his tub. Continue home dose of PPI as patient previously had a duodenal ulcer. Colonoscopy recommendation from GI when it is safe to hold antiplatelet/anticoagulation.  Follow up with Dr. Newby. Follow up with outpatient GI. Can work on discharge planning in the next 24 to 48 hours.           Past Medical History  He has no past medical history on file.     Surgical History  He has a past surgical history that includes CT angio abdomen pelvis w and or wo IV IV contrast (1/30/2023).     Social History  Former smoker quit ~10 years ago.  Drinks few beers every couple of days, last drink 5 days ago     Family History  Family History   No family history on file.        Allergies  Penicillins and Penicillin v          Objective     Physical Exam  HENT:      Left Ear:  "External ear normal.      Mouth/Throat:      Mouth: Mucous membranes are dry.   Eyes:      General: Scleral icterus present.         Right eye: Discharge present.      Extraocular Movements: Extraocular movements intact.   Cardiovascular:      Rate and Rhythm: Normal rate and regular rhythm.      Heart sounds: Murmur heard.   Pulmonary:      Effort: Pulmonary effort is normal. No respiratory distress.   Abdominal:      General: Abdomen is flat.      Palpations: Abdomen is soft.   Musculoskeletal:      Left lower leg: No edema.   Skin:     Findings: Lesion present.          Neurological:      Mental Status: He is alert.         Last Recorded Vitals  Blood pressure 145/78, pulse 71, temperature 36.7 °C (98.1 °F), temperature source Temporal, resp. rate 16, height 1.803 m (5' 11\"), weight 89.6 kg (197 lb 8.5 oz), SpO2 96 %.  Intake/Output last 3 Shifts:  I/O last 3 completed shifts:  In: 900 (10 mL/kg) [P.O.:800; IV Piggyback:100]  Out: 1110 (12.4 mL/kg) [Urine:1100 (0.3 mL/kg/hr); Blood:10]  Weight: 89.6 kg     Relevant Results           This patient currently has cardiac telemetry ordered; if you would like to modify or discontinue the telemetry order, click here to go to the orders activity to modify/discontinue the order.        Scheduled medications  aspirin, 81 mg, oral, Daily  [START ON 10/4/2023] atorvastatin, 80 mg, oral, Daily  carvedilol, 3.125 mg, oral, BID with meals  cyanocobalamin, 1,000 mcg, oral, Daily  ferrous sulfate, 65 mg of iron, oral, Daily with breakfast  folic acid, 1 mg, oral, Daily  insulin lispro, 0-10 Units, subcutaneous, TID with meals  lidocaine, 1 patch, transdermal, Daily  losartan, 25 mg, oral, Daily  oxymetazoline, 2 spray, Each Nostril, BID  pantoprazole, 40 mg, intravenous, Daily before breakfast  perflutren lipid microspheres, 0.5 mL, intravenous, Once in imaging  polyethylene glycol, 17 g, oral, Daily  sulfur hexafluoride microsphr, 2 mL, intravenous, Once in imaging  traMADol, 50 " mg, oral, BID      Continuous medications  [Held by provider] heparin, 0-4,000 Units/hr, Last Rate: 1,200 Units/hr (10/02/23 0251)      PRN medications  PRN medications: acetaminophen, acetaminophen **OR** [DISCONTINUED] acetaminophen **OR** acetaminophen, dextrose, dextrose, glucagon, [Held by provider] heparin, melatonin, ondansetron, oxyCODONE           Assessment/Plan   Principal Problem:    Troponin level elevated  Active Problems:    Type II diabetes mellitus with hypoglycemia (CMS/[facility])    Stroke (CMS/[facility])    HTN (hypertension)    HLD (hyperlipidemia)    B12 deficiency    Iron deficiency    NSTEMI (non-ST elevated myocardial infarction) (CMS/[facility])      Patient with acute hypoglycemic episode  Blood sugars improved  Continue insulin correction scale TID AC to start at 2 units  Discontinue Glimepiride use given A1C and hypoglycemic event  Endocrinology following  Hold Metformin for 48 hours post cardiac cath  Continue aspirin 81mg daily and atorvastatin 80mg daily  Cardiology following  Stop heparin drip  Supplement iron and B12  Continue PPI  Resume tramadol for low back pain in left toe pain  Check labs  Follow up with Dr. Newby  Follow up with outpatient GI  Discharge planning likely today awaiting home med reconciliation to be done.  See orders for complete plan          I spent 45 minutes in the professional and overall care of this patient.      Loretta Coleman

## 2023-10-04 VITALS
RESPIRATION RATE: 16 BRPM | WEIGHT: 196.65 LBS | OXYGEN SATURATION: 97 % | TEMPERATURE: 97.5 F | SYSTOLIC BLOOD PRESSURE: 127 MMHG | BODY MASS INDEX: 27.53 KG/M2 | HEART RATE: 68 BPM | DIASTOLIC BLOOD PRESSURE: 60 MMHG | HEIGHT: 71 IN

## 2023-10-04 PROBLEM — R79.89 TROPONIN LEVEL ELEVATED: Status: RESOLVED | Noted: 2023-09-30 | Resolved: 2023-10-04

## 2023-10-04 PROBLEM — I08.0 MITRAL VALVE STENOSIS AND AORTIC VALVE STENOSIS: Status: RESOLVED | Noted: 2023-10-04 | Resolved: 2023-10-04

## 2023-10-04 PROBLEM — E11.649: Status: RESOLVED | Noted: 2023-09-30 | Resolved: 2023-10-04

## 2023-10-04 PROBLEM — E11.9 DIABETES MELLITUS TYPE II, NON INSULIN DEPENDENT (MULTI): Status: ACTIVE | Noted: 2023-10-04

## 2023-10-04 PROBLEM — I08.0 MITRAL VALVE STENOSIS AND AORTIC VALVE STENOSIS: Status: ACTIVE | Noted: 2023-10-04

## 2023-10-04 PROBLEM — D50.8 IRON DEFICIENCY ANEMIA SECONDARY TO INADEQUATE DIETARY IRON INTAKE: Status: ACTIVE | Noted: 2023-10-04

## 2023-10-04 PROBLEM — I21.4 NSTEMI (NON-ST ELEVATED MYOCARDIAL INFARCTION) (MULTI): Status: RESOLVED | Noted: 2023-09-30 | Resolved: 2023-10-04

## 2023-10-04 PROBLEM — I35.9 AORTIC VALVE DISEASE: Status: ACTIVE | Noted: 2023-10-04

## 2023-10-04 LAB
ANION GAP SERPL CALC-SCNC: 12 MMOL/L (ref 10–20)
BASOPHILS # BLD AUTO: 0.05 X10*3/UL (ref 0–0.1)
BASOPHILS NFR BLD AUTO: 0.7 %
BUN SERPL-MCNC: 17 MG/DL (ref 6–23)
CALCIUM SERPL-MCNC: 8.9 MG/DL (ref 8.6–10.3)
CHLORIDE SERPL-SCNC: 105 MMOL/L (ref 98–107)
CO2 SERPL-SCNC: 23 MMOL/L (ref 21–32)
CREAT SERPL-MCNC: 0.9 MG/DL (ref 0.5–1.3)
EOSINOPHIL # BLD AUTO: 0.54 X10*3/UL (ref 0–0.7)
EOSINOPHIL NFR BLD AUTO: 7.3 %
ERYTHROCYTE [DISTWIDTH] IN BLOOD BY AUTOMATED COUNT: 17.2 % (ref 11.5–14.5)
GFR SERPL CREATININE-BSD FRML MDRD: >90 ML/MIN/1.73M*2
GLUCOSE BLD MANUAL STRIP-MCNC: 127 MG/DL (ref 74–99)
GLUCOSE BLD MANUAL STRIP-MCNC: 89 MG/DL (ref 74–99)
GLUCOSE SERPL-MCNC: 86 MG/DL (ref 74–99)
HCT VFR BLD AUTO: 28.8 % (ref 41–52)
HGB BLD-MCNC: 9 G/DL (ref 13.5–17.5)
IMM GRANULOCYTES # BLD AUTO: 0.03 X10*3/UL (ref 0–0.7)
IMM GRANULOCYTES NFR BLD AUTO: 0.4 % (ref 0–0.9)
LYMPHOCYTES # BLD AUTO: 1.49 X10*3/UL (ref 1.2–4.8)
LYMPHOCYTES NFR BLD AUTO: 20.2 %
MAGNESIUM SERPL-MCNC: 1.8 MG/DL (ref 1.6–2.4)
MCH RBC QN AUTO: 27.6 PG (ref 26–34)
MCHC RBC AUTO-ENTMCNC: 31.3 G/DL (ref 32–36)
MCV RBC AUTO: 88 FL (ref 80–100)
MONOCYTES # BLD AUTO: 0.75 X10*3/UL (ref 0.1–1)
MONOCYTES NFR BLD AUTO: 10.1 %
NEUTROPHILS # BLD AUTO: 4.53 X10*3/UL (ref 1.2–7.7)
NEUTROPHILS NFR BLD AUTO: 61.3 %
NRBC BLD-RTO: 0 /100 WBCS (ref 0–0)
PLATELET # BLD AUTO: 292 X10*3/UL (ref 150–450)
PMV BLD AUTO: 8.9 FL (ref 7.5–11.5)
POTASSIUM SERPL-SCNC: 3.9 MMOL/L (ref 3.5–5.3)
RBC # BLD AUTO: 3.26 X10*6/UL (ref 4.5–5.9)
SODIUM SERPL-SCNC: 136 MMOL/L (ref 136–145)
WBC # BLD AUTO: 7.4 X10*3/UL (ref 4.4–11.3)

## 2023-10-04 PROCEDURE — 2500000001 HC RX 250 WO HCPCS SELF ADMINISTERED DRUGS (ALT 637 FOR MEDICARE OP): Performed by: INTERNAL MEDICINE

## 2023-10-04 PROCEDURE — 2500000001 HC RX 250 WO HCPCS SELF ADMINISTERED DRUGS (ALT 637 FOR MEDICARE OP): Performed by: STUDENT IN AN ORGANIZED HEALTH CARE EDUCATION/TRAINING PROGRAM

## 2023-10-04 PROCEDURE — 82947 ASSAY GLUCOSE BLOOD QUANT: CPT

## 2023-10-04 PROCEDURE — 2500000004 HC RX 250 GENERAL PHARMACY W/ HCPCS (ALT 636 FOR OP/ED): Performed by: PHYSICIAN ASSISTANT

## 2023-10-04 PROCEDURE — 85025 COMPLETE CBC W/AUTO DIFF WBC: CPT | Performed by: INTERNAL MEDICINE

## 2023-10-04 PROCEDURE — 80048 BASIC METABOLIC PNL TOTAL CA: CPT | Performed by: INTERNAL MEDICINE

## 2023-10-04 PROCEDURE — 99232 SBSQ HOSP IP/OBS MODERATE 35: CPT | Performed by: PHYSICIAN ASSISTANT

## 2023-10-04 PROCEDURE — 2500000001 HC RX 250 WO HCPCS SELF ADMINISTERED DRUGS (ALT 637 FOR MEDICARE OP): Performed by: PHYSICIAN ASSISTANT

## 2023-10-04 PROCEDURE — 83735 ASSAY OF MAGNESIUM: CPT | Performed by: INTERNAL MEDICINE

## 2023-10-04 PROCEDURE — 36415 COLL VENOUS BLD VENIPUNCTURE: CPT | Performed by: INTERNAL MEDICINE

## 2023-10-04 PROCEDURE — 99232 SBSQ HOSP IP/OBS MODERATE 35: CPT | Performed by: INTERNAL MEDICINE

## 2023-10-04 PROCEDURE — C9113 INJ PANTOPRAZOLE SODIUM, VIA: HCPCS | Performed by: PHYSICIAN ASSISTANT

## 2023-10-04 PROCEDURE — 99239 HOSP IP/OBS DSCHRG MGMT >30: CPT | Performed by: INTERNAL MEDICINE

## 2023-10-04 RX ORDER — METFORMIN HYDROCHLORIDE 500 MG/1
500 TABLET ORAL
COMMUNITY
Start: 2023-05-16

## 2023-10-04 RX ORDER — FERROUS SULFATE 325(65) MG
65 TABLET ORAL
Qty: 90 TABLET | Refills: 1 | Status: SHIPPED | OUTPATIENT
Start: 2023-10-04 | End: 2024-03-20 | Stop reason: SDUPTHER

## 2023-10-04 RX ORDER — ATORVASTATIN CALCIUM 80 MG/1
80 TABLET, FILM COATED ORAL DAILY
Qty: 90 TABLET | Refills: 1 | Status: SHIPPED | OUTPATIENT
Start: 2023-10-04 | End: 2024-03-20 | Stop reason: SDUPTHER

## 2023-10-04 RX ORDER — TRAMADOL HYDROCHLORIDE 50 MG/1
50 TABLET ORAL EVERY 6 HOURS PRN
COMMUNITY
Start: 2023-08-16 | End: 2023-11-14

## 2023-10-04 RX ORDER — BLACK COHOSH ROOT 200 MG
500 CAPSULE ORAL DAILY
COMMUNITY
Start: 2023-03-24

## 2023-10-04 RX ORDER — ERGOCALCIFEROL 1.25 MG/1
50000 CAPSULE ORAL
COMMUNITY
Start: 2018-10-09

## 2023-10-04 RX ORDER — NAPROXEN SODIUM 220 MG/1
81 TABLET, FILM COATED ORAL DAILY
Qty: 30 TABLET | Refills: 3 | Status: SHIPPED | OUTPATIENT
Start: 2023-10-04

## 2023-10-04 RX ORDER — LANOLIN ALCOHOL/MO/W.PET/CERES
1000 CREAM (GRAM) TOPICAL DAILY
Qty: 30 TABLET | Refills: 90 | Status: SHIPPED | OUTPATIENT
Start: 2023-10-04

## 2023-10-04 RX ORDER — ACETAMINOPHEN 325 MG/1
2 TABLET ORAL EVERY 4 HOURS PRN
COMMUNITY

## 2023-10-04 RX ORDER — FOLIC ACID 1 MG/1
1 TABLET ORAL DAILY
Qty: 20 TABLET | Refills: 0 | Status: SHIPPED | OUTPATIENT
Start: 2023-10-04 | End: 2023-10-19

## 2023-10-04 RX ORDER — TALC
9 POWDER (GRAM) TOPICAL NIGHTLY
COMMUNITY

## 2023-10-04 RX ORDER — GABAPENTIN 100 MG/1
100 CAPSULE ORAL 3 TIMES DAILY
COMMUNITY
Start: 2023-09-26 | End: 2024-01-11

## 2023-10-04 RX ORDER — CYCLOBENZAPRINE HCL 5 MG
5 TABLET ORAL NIGHTLY
COMMUNITY
Start: 2023-07-24

## 2023-10-04 RX ORDER — MULTIVIT WITH MINERALS/HERBS
1 TABLET ORAL DAILY
COMMUNITY
End: 2023-10-04 | Stop reason: HOSPADM

## 2023-10-04 RX ORDER — CLOPIDOGREL BISULFATE 75 MG/1
75 TABLET ORAL DAILY
COMMUNITY
Start: 2023-08-16

## 2023-10-04 RX ORDER — LOSARTAN POTASSIUM 50 MG/1
50 TABLET ORAL DAILY
COMMUNITY
Start: 2023-05-16 | End: 2023-10-04 | Stop reason: HOSPADM

## 2023-10-04 RX ORDER — GLIMEPIRIDE 2 MG/1
1 TABLET ORAL DAILY
COMMUNITY
Start: 2023-04-25 | End: 2023-10-04 | Stop reason: HOSPADM

## 2023-10-04 RX ORDER — PANTOPRAZOLE SODIUM 40 MG/1
40 TABLET, DELAYED RELEASE ORAL
COMMUNITY
Start: 2023-02-02

## 2023-10-04 RX ORDER — CARVEDILOL 3.12 MG/1
3.12 TABLET ORAL
Qty: 120 TABLET | Refills: 1 | Status: SHIPPED | OUTPATIENT
Start: 2023-10-04 | End: 2024-01-21

## 2023-10-04 RX ADMIN — LOSARTAN POTASSIUM 25 MG: 25 TABLET, FILM COATED ORAL at 09:54

## 2023-10-04 RX ADMIN — TRAMADOL HYDROCHLORIDE 50 MG: 50 TABLET, COATED ORAL at 09:54

## 2023-10-04 RX ADMIN — CARBOXYMETHYLCELLULOSE SODIUM 1 DROP: 5 SOLUTION/ DROPS OPHTHALMIC at 06:13

## 2023-10-04 RX ADMIN — OXYMETAZOLINE HYDROCHLORIDE 2 SPRAY: 0.05 SPRAY NASAL at 09:53

## 2023-10-04 RX ADMIN — CARVEDILOL 3.12 MG: 3.12 TABLET, FILM COATED ORAL at 09:52

## 2023-10-04 RX ADMIN — PANTOPRAZOLE SODIUM 40 MG: 40 INJECTION, POWDER, FOR SOLUTION INTRAVENOUS at 06:13

## 2023-10-04 RX ADMIN — CYANOCOBALAMIN TAB 1000 MCG 1000 MCG: 1000 TAB at 09:52

## 2023-10-04 RX ADMIN — OXYCODONE HYDROCHLORIDE 5 MG: 5 TABLET ORAL at 06:12

## 2023-10-04 RX ADMIN — ASPIRIN 81 MG CHEWABLE TABLET 81 MG: 81 TABLET CHEWABLE at 09:52

## 2023-10-04 RX ADMIN — ACETAMINOPHEN 650 MG: 325 TABLET, FILM COATED ORAL at 06:12

## 2023-10-04 RX ADMIN — FOLIC ACID 1 MG: 1 TABLET ORAL at 09:52

## 2023-10-04 RX ADMIN — FERROUS SULFATE TAB 325 MG (65 MG ELEMENTAL FE) 65 MG OF IRON: 325 (65 FE) TAB at 09:52

## 2023-10-04 ASSESSMENT — PAIN SCALES - GENERAL
PAINLEVEL_OUTOF10: 0 - NO PAIN
PAINLEVEL_OUTOF10: 5 - MODERATE PAIN

## 2023-10-04 NOTE — PROGRESS NOTES
[facility] aware that patient is discharging today. They will follow up with patient at home.

## 2023-10-04 NOTE — DISCHARGE SUMMARY
Discharge Diagnosis  Non-ST elevation MI with troponin elevation    Principal Problem:    Troponin level elevated  Active Problems:    Type II diabetes mellitus with hypoglycemia (CMS/[facility])    Stroke (CMS/[facility])    HTN (hypertension)    HLD (hyperlipidemia)    B12 deficiency    Iron deficiency    NSTEMI (non-ST elevated myocardial infarction) (CMS/[facility])    Issues Requiring Follow-Up  Outpatient follow-up for evaluation with cardiology regarding medical versus surgical or other interventional treatment of multivessel coronary disease    Discharge Meds     Your medication list        START taking these medications        Instructions Last Dose Given Next Dose Due   aspirin 81 mg chewable tablet      Chew 1 tablet (81 mg) once daily.       atorvastatin 80 mg tablet  Commonly known as: Lipitor      Take 1 tablet (80 mg) by mouth once daily.       carvedilol 3.125 mg tablet  Commonly known as: Coreg      Take 1 tablet (3.125 mg) by mouth 2 times a day with meals.       cyanocobalamin 1,000 mcg tablet  Commonly known as: Vitamin B-12      Take 1 tablet (1,000 mcg) by mouth once daily.       ferrous sulfate 325 (65 Fe) MG tablet      Take 1 tablet (65 mg of iron) by mouth once daily with a meal.       folic acid 1 mg tablet  Commonly known as: Folvite      Take 1 tablet (1 mg) by mouth once daily.              CONTINUE taking these medications        Instructions Last Dose Given Next Dose Due   acetaminophen 325 mg tablet  Commonly known as: Tylenol           B complex-C-E-FA-Zn-lysine tablet           clopidogrel 75 mg tablet  Commonly known as: Plavix           cyclobenzaprine 5 mg tablet  Commonly known as: Flexeril           ergocalciferol 1.25 MG (18925 UT) capsule  Commonly known as: Vitamin D-2           gabapentin 100 mg capsule  Commonly known as: Neurontin           melatonin 3 mg tablet           metFORMIN 500 mg tablet  Commonly known as: Glucophage           pantoprazole 40 mg EC tablet  Commonly known as:  ProtoNix           traMADol 50 mg tablet  Commonly known as: Ultram           Vitamin C 500 mg tablet  Generic drug: ascorbic acid                  STOP taking these medications      B Complex-Vitamin B12 tablet  Generic drug: vitamin B complex        glimepiride 2 mg tablet  Commonly known as: Amaryl        losartan 50 mg tablet  Commonly known as: Cozaar        multivitamin with minerals tablet                  Where to Get Your Medications        These medications were sent to [id number] - [place], [place] - [address]  [address] [address]      Phone: [phone]   aspirin 81 mg chewable tablet  atorvastatin 80 mg tablet  carvedilol 3.125 mg tablet  cyanocobalamin 1,000 mcg tablet  ferrous sulfate 325 (65 Fe) MG tablet  folic acid 1 mg tablet         Test Results Pending At Discharge  Pending Labs       No current pending labs.            Hospital Course   Santana Mayo is a 63 y.o. male with PMH of prior stroke with residual left sided deficits, HLD, NIDDM II, who presents with concern for stroke.  States that he woke up this morning his vision was very blurred, unable to see anything effectively.  Presents life alert for EMS assistance.  Does report slurred speech upon EMS arrival.  Denies any headache, increased left-sided weakness from baseline, shortness of breath, chest pain, nausea, vomiting, diarrhea.  EMS checked his glucose levels, noted to be in the 60s and was given an amp of D50.  Patient denies any recent medication changes.  States he does not take any insulin.  States he has been taking his antidiabetic regimen as prescribed.  States his glucose levels have been well controlled recently.     ED course: HR 79, RR 20, /89, pulse ox 99% on room air.  No leukocytosis, Hgb 9.7.  Glucose 44 on arrival.  Chemistries remarkable for K5.4, troponin 135 -> 114.  CT head shows no acute intracranial process.  Given amp of D50 in ED with glucose increased to  200s.  Given hypoglycemia and elevated troponin decision made for admission.     10/1: Patient states he has not really ever had troubles with his sugars dropping.  It appears on reviewing his labs he has had some chronic anemia and his provider at primary care had asked him about dark or bloody stool.  The patient denies any bright red blood per stool but he is fairly anemic and his hemoglobin has been dropping especially in the last 24 hours.  Hemoglobin had dropped from 9.7-8.1.  We will be stopping heparin drip at this time.  Cardiology is recommending possibly cardiac cath and endocrinology notes they think this would be a good idea.  I suspect the patient did have a fairly primary hypoglycemic episode of unknown etiology that may have helped precipitate his type II MI.  Although it is difficult to say.  The patient notes no acute issue the day before coming in that he is aware of.  He states he may not have eaten as well as he normally does not like before.  There may be more issues going on but his initial complaints of confusion and possible stroke were likely from hypoglycemia.  Do suspect he likely does have some significant coronary disease because of his ongoing diabetes etc.  We will continue to follow with endocrinology, cardiology and will also involve GI.     10/3: Patient was seen at bedside today and is feeling disappointed. Patient denies any shortness of breath or chest pain. Patient underwent heart catheterization, per post procedure note, severe left main artery stenosis and chronic total RCA occlusion seen. Improvement in blood pressure today, however Cardiology added Losartan for additional blood pressure reduction. Continue aspirin 81mg daily and atorvastatin 80mg daily. Cardiology recommends cardiac surgery evaluation for CABG. Patient had debridement of left medial hallux wound with podiatry yesterday. Patient had the wound on the side of his foot for about one week and occurred after  bumping his foot on the side of his tub. Continue home dose of PPI as patient previously had a duodenal ulcer. Colonoscopy recommendation from GI when it is safe to hold antiplatelet/anticoagulation.  Follow up with Dr. Newby. Follow up with outpatient GI. Can work on discharge planning in the next 24 to 48 hours.     10/4: Patient stable ready for discharge will need outpatient follow-up closely with cardiology to evaluate for medical therapy versus interventional therapy for coronary disease possibly bypass.  We will follow-up with [facility] care.  Endocrinology is discontinued Amaryl at this time continue metformin on discharge.    Patient and/or family are to be given a copy of their discharge profile as well as discharge medications sheet prior to leaving the hospital. Please review these sheets for more concise discharge information and instructions.    35 minutes spent in the care of this patient.       Pertinent Physical Exam At Time of Discharge  Physical Exam    Outpatient Follow-Up  Future Appointments   Date Time Provider Department Center   10/10/2023 11:30 AM Umesh Newby DO [id number]         Angel Panda MD

## 2023-10-04 NOTE — PROGRESS NOTES
"Santana Mayo is a 63 y.o. male on day 4 of admission presenting with Troponin level elevated.    Subjective   Glycemic control has been reviewed.  Patient is sleeping        Objective     Physical Exam  Constitutional:       General: He is not in acute distress.     Appearance: Normal appearance. He is normal weight.   HENT:      Head: Normocephalic and atraumatic.   Pulmonary:      Effort: Pulmonary effort is normal.   Musculoskeletal:         General: Normal range of motion.      Comments: Dressing clean and dry to left great toe    Skin:     General: Skin is warm.   Neurological:      Mental Status: He is alert.         Last Recorded Vitals  Blood pressure 135/78, pulse 66, temperature 36.6 °C (97.9 °F), resp. rate 16, height 1.803 m (5' 11\"), weight 89.2 kg (196 lb 10.4 oz), SpO2 95 %.  Intake/Output last 3 Shifts:  I/O last 3 completed shifts:  In: 640 (7.2 mL/kg) [P.O.:540; IV Piggyback:100]  Out: 1610 (18 mL/kg) [Urine:1600 (0.5 mL/kg/hr); Blood:10]  Weight: 89.2 kg     Relevant Results  Scheduled medications  aspirin, 81 mg, oral, Daily  atorvastatin, 80 mg, oral, Daily  carvedilol, 3.125 mg, oral, BID with meals  cyanocobalamin, 1,000 mcg, oral, Daily  ferrous sulfate, 65 mg of iron, oral, Daily with breakfast  folic acid, 1 mg, oral, Daily  insulin lispro, 0-10 Units, subcutaneous, TID with meals  lidocaine, 1 patch, transdermal, Daily  losartan, 25 mg, oral, Daily  oxymetazoline, 2 spray, Each Nostril, BID  pantoprazole, 40 mg, intravenous, Daily before breakfast  perflutren lipid microspheres, 0.5 mL, intravenous, Once in imaging  polyethylene glycol, 17 g, oral, Daily  sulfur hexafluoride microsphr, 2 mL, intravenous, Once in imaging  traMADol, 50 mg, oral, BID      Continuous medications  [Held by provider] heparin, 0-4,000 Units/hr, Last Rate: 1,200 Units/hr (10/02/23 0251)      PRN medications  PRN medications: acetaminophen, acetaminophen **OR** [DISCONTINUED] acetaminophen **OR** acetaminophen, " dextrose 10 % in water (D10W), dextrose, glucagon, [Held by provider] heparin, lubricating eye drops, melatonin, ondansetron, oxyCODONE    Results for orders placed or performed during the hospital encounter of 09/30/23 (from the past 24 hour(s))   POCT GLUCOSE   Result Value Ref Range    POCT Glucose 103 (H) 74 - 99 mg/dL   POCT GLUCOSE   Result Value Ref Range    POCT Glucose 117 (H) 74 - 99 mg/dL   POCT GLUCOSE   Result Value Ref Range    POCT Glucose 158 (H) 74 - 99 mg/dL   POCT GLUCOSE   Result Value Ref Range    POCT Glucose 191 (H) 74 - 99 mg/dL   Magnesium   Result Value Ref Range    Magnesium 1.80 1.60 - 2.40 mg/dL   Basic Metabolic Panel   Result Value Ref Range    Glucose 86 74 - 99 mg/dL    Sodium 136 136 - 145 mmol/L    Potassium 3.9 3.5 - 5.3 mmol/L    Chloride 105 98 - 107 mmol/L    Bicarbonate 23 21 - 32 mmol/L    Anion Gap 12 10 - 20 mmol/L    Urea Nitrogen 17 6 - 23 mg/dL    Creatinine 0.90 0.50 - 1.30 mg/dL    eGFR >90 >60 mL/min/1.73m*2    Calcium 8.9 8.6 - 10.3 mg/dL   CBC and Auto Differential   Result Value Ref Range    WBC 7.4 4.4 - 11.3 x10*3/uL    nRBC 0.0 0.0 - 0.0 /100 WBCs    RBC 3.26 (L) 4.50 - 5.90 x10*6/uL    Hemoglobin 9.0 (L) 13.5 - 17.5 g/dL    Hematocrit 28.8 (L) 41.0 - 52.0 %    MCV 88 80 - 100 fL    MCH 27.6 26.0 - 34.0 pg    MCHC 31.3 (L) 32.0 - 36.0 g/dL    RDW 17.2 (H) 11.5 - 14.5 %    Platelets 292 150 - 450 x10*3/uL    MPV 8.9 7.5 - 11.5 fL    Neutrophils % 61.3 40.0 - 80.0 %    Immature Granulocytes %, Automated 0.4 0.0 - 0.9 %    Lymphocytes % 20.2 13.0 - 44.0 %    Monocytes % 10.1 2.0 - 10.0 %    Eosinophils % 7.3 0.0 - 6.0 %    Basophils % 0.7 0.0 - 2.0 %    Neutrophils Absolute 4.53 1.20 - 7.70 x10*3/uL    Immature Granulocytes Absolute, Automated 0.03 0.00 - 0.70 x10*3/uL    Lymphocytes Absolute 1.49 1.20 - 4.80 x10*3/uL    Monocytes Absolute 0.75 0.10 - 1.00 x10*3/uL    Eosinophils Absolute 0.54 0.00 - 0.70 x10*3/uL    Basophils Absolute 0.05 0.00 - 0.10 x10*3/uL       CT brain attack head wo IV contrast    Result Date: 10/2/2023  Interpreted By:  Schoenberger, Joseph, STUDY: CT BRAIN ATTACK HEAD WO IV CONTRAST;  9/30/2023 8:29 am   INDICATION: Signs/Symptoms:stroke alert.   COMPARISON: None.   ACCESSION NUMBER(S): [id number]   ORDERING CLINICIAN: RUDOLPH SCHULTZ   TECHNIQUE: Noncontrast axial CT scan of head was performed. Angled reformats in brain and bone windows were generated. The images were reviewed in bone, brain, blood and soft tissue windows.   FINDINGS: CSF Spaces: Enlarged due to parenchymal volume loss. Normal configuration intact basal cisterns. There is no extraaxial fluid collection.   Parenchyma: Periventricular deep white matter hypoattenuation consistent with small vessel ischemic white matter demyelination. Remote infarct extending from the posterior right frontal deep white matter into the right basal ganglia. Stroke alert the grey-white differentiation is intact. There is no mass effect or midline shift. There is no intracranial hemorrhage.   Calvarium: The calvarium is unremarkable.   Paranasal sinuses and mastoids: Visualized paranasal sinuses and mastoids are clear.       No evidence of acute cortical infarct or intracranial hemorrhage.   Atrophy and chronic small vessel ischemic white matter demyelination. Remote deep white matter/basal ganglia infarct right hemisphere.   MACRO: .  No definite acute finding. Atrophy and chronic ischemic white matter demyelination. Joseph Schoenberger discussed the significance and urgency of this critical finding by telephone with  RUDOLPH SCHULTZ on 9/30/2023 at 8:38 am.  (**-RCF-**) Findings:  See findings.     Signed by: Joseph Schoenberger 9/30/2023 8:41 AM Dictation workstation:   [id number]        Assessment/Plan   Principal Problem:    Troponin level elevated  Active Problems:    Type II diabetes mellitus with hypoglycemia (CMS/[facility])    Stroke (CMS/[facility])    HTN (hypertension)    HLD (hyperlipidemia)    B12  deficiency    Iron deficiency    NSTEMI (non-ST elevated myocardial infarction) (CMS/[facility])    Controlled type II DM with A1C of 5.6%  To continue insulin correction scale TID AC to start at 2 units   Diabetic diet   Accu-Cheks ACHS    Hypoglycemic protocol   Can restart Metformin tomorrow   To discontinue Glimepiride use given A1C and hypoglycemic event   Will continue to follow        Enedelia Vieyra MD

## 2023-10-04 NOTE — PROGRESS NOTES
"Santana Mayo is a 63 y.o. male on day 4 of admission presenting with Troponin level elevated.    Subjective   Patient seen and examined. He complains of back pain. Denies lightheadedness, CP, worsening SOB, N/V, abdominal pain, melena, hematochezia. Hemoglobin increasing.    10 point ROS obtained and negative other than discussed above.       Objective     Physical Exam  Vitals reviewed.   Constitutional:       General: He is not in acute distress.     Appearance: Normal appearance.   Cardiovascular:      Rate and Rhythm: Normal rate and regular rhythm.   Pulmonary:      Effort: Pulmonary effort is normal.      Breath sounds: Normal breath sounds.   Abdominal:      General: Bowel sounds are normal. There is no distension.      Palpations: Abdomen is soft.      Tenderness: There is no abdominal tenderness. There is no guarding or rebound.   Neurological:      General: No focal deficit present.      Mental Status: He is alert and oriented to person, place, and time.   Psychiatric:         Mood and Affect: Mood normal.         Behavior: Behavior normal.         Last Recorded Vitals  Blood pressure 127/60, pulse 68, temperature 36.4 °C (97.5 °F), temperature source Temporal, resp. rate 16, height 1.803 m (5' 11\"), weight 89.2 kg (196 lb 10.4 oz), SpO2 97 %.  Intake/Output last 3 Shifts:  I/O last 3 completed shifts:  In: 640 (7.2 mL/kg) [P.O.:540; IV Piggyback:100]  Out: 1610 (18 mL/kg) [Urine:1600 (0.5 mL/kg/hr); Blood:10]  Weight: 89.2 kg     Relevant Results      Scheduled medications  aspirin, 81 mg, oral, Daily  atorvastatin, 80 mg, oral, Daily  carvedilol, 3.125 mg, oral, BID with meals  cyanocobalamin, 1,000 mcg, oral, Daily  ferrous sulfate, 65 mg of iron, oral, Daily with breakfast  folic acid, 1 mg, oral, Daily  insulin lispro, 0-10 Units, subcutaneous, TID with meals  lidocaine, 1 patch, transdermal, Daily  losartan, 25 mg, oral, Daily  oxymetazoline, 2 spray, Each Nostril, BID  pantoprazole, 40 mg, " intravenous, Daily before breakfast  perflutren lipid microspheres, 0.5 mL, intravenous, Once in imaging  polyethylene glycol, 17 g, oral, Daily  sulfur hexafluoride microsphr, 2 mL, intravenous, Once in imaging  traMADol, 50 mg, oral, BID      Continuous medications  [Held by provider] heparin, 0-4,000 Units/hr, Last Rate: 1,200 Units/hr (10/02/23 0251)      PRN medications  PRN medications: acetaminophen, acetaminophen **OR** [DISCONTINUED] acetaminophen **OR** acetaminophen, dextrose 10 % in water (D10W), dextrose, glucagon, [Held by provider] heparin, lubricating eye drops, melatonin, ondansetron, oxyCODONE  Results for orders placed or performed during the hospital encounter of 09/30/23 (from the past 24 hour(s))   POCT GLUCOSE   Result Value Ref Range    POCT Glucose 117 (H) 74 - 99 mg/dL   POCT GLUCOSE   Result Value Ref Range    POCT Glucose 158 (H) 74 - 99 mg/dL   POCT GLUCOSE   Result Value Ref Range    POCT Glucose 191 (H) 74 - 99 mg/dL   Magnesium   Result Value Ref Range    Magnesium 1.80 1.60 - 2.40 mg/dL   Basic Metabolic Panel   Result Value Ref Range    Glucose 86 74 - 99 mg/dL    Sodium 136 136 - 145 mmol/L    Potassium 3.9 3.5 - 5.3 mmol/L    Chloride 105 98 - 107 mmol/L    Bicarbonate 23 21 - 32 mmol/L    Anion Gap 12 10 - 20 mmol/L    Urea Nitrogen 17 6 - 23 mg/dL    Creatinine 0.90 0.50 - 1.30 mg/dL    eGFR >90 >60 mL/min/1.73m*2    Calcium 8.9 8.6 - 10.3 mg/dL   CBC and Auto Differential   Result Value Ref Range    WBC 7.4 4.4 - 11.3 x10*3/uL    nRBC 0.0 0.0 - 0.0 /100 WBCs    RBC 3.26 (L) 4.50 - 5.90 x10*6/uL    Hemoglobin 9.0 (L) 13.5 - 17.5 g/dL    Hematocrit 28.8 (L) 41.0 - 52.0 %    MCV 88 80 - 100 fL    MCH 27.6 26.0 - 34.0 pg    MCHC 31.3 (L) 32.0 - 36.0 g/dL    RDW 17.2 (H) 11.5 - 14.5 %    Platelets 292 150 - 450 x10*3/uL    MPV 8.9 7.5 - 11.5 fL    Neutrophils % 61.3 40.0 - 80.0 %    Immature Granulocytes %, Automated 0.4 0.0 - 0.9 %    Lymphocytes % 20.2 13.0 - 44.0 %    Monocytes %  10.1 2.0 - 10.0 %    Eosinophils % 7.3 0.0 - 6.0 %    Basophils % 0.7 0.0 - 2.0 %    Neutrophils Absolute 4.53 1.20 - 7.70 x10*3/uL    Immature Granulocytes Absolute, Automated 0.03 0.00 - 0.70 x10*3/uL    Lymphocytes Absolute 1.49 1.20 - 4.80 x10*3/uL    Monocytes Absolute 0.75 0.10 - 1.00 x10*3/uL    Eosinophils Absolute 0.54 0.00 - 0.70 x10*3/uL    Basophils Absolute 0.05 0.00 - 0.10 x10*3/uL   POCT GLUCOSE   Result Value Ref Range    POCT Glucose 89 74 - 99 mg/dL       No specimens collected during this procedure.  Last relevant procedure:        This patient currently has cardiac telemetry ordered; if you would like to modify or discontinue the telemetry order, click here to go to the orders activity to modify/discontinue the order.                 Assessment/Plan   Active Problems:    HTN (hypertension)    HLD (hyperlipidemia)    B12 deficiency    Iron deficiency    Anemia  No overt GI bleeding. Patient admitted with concern for CVA and NSTEMI. In the absence of overt bleeding would not plan for additional GI evaluation at this time. Previously had had a duodenal ulcer and should remain on PPI therapy for this. He has never had a colonoscopy so an outpatient colonoscopy would be reasonable at a time when it would be safe to hold any antiplatelet/anticoagulation. He did undergo cardiac catheterization yesterday and per post procedure note, severe left main artery stenosis and chronic total RCA occlusion seen. Notes mention cardiac surgery referral. He has had no signs of bleeding.  - monitor H&H and transfuse as needed  - continue home dose PPI  - follow up with GI clinic as outpatient.         Case was reviewed with Dr. Jimenez. GI will sign off. Please call with questions or concerns.             Lee Ann Chung PA-C

## 2023-10-05 NOTE — PROGRESS NOTES
[address]  [facility] Heart and Vascular Cardiology    Patient Name: Santana Mayo  Patient : 1960    Scribe Attestation  By signing my name below, [employer] Scribe   attest that this documentation has been prepared under the direction and in the presence of Umesh Newby DO.       Reason for visit:  This is a 63-year-old male here for follow-up regarding NSTEMI with  of his RCA and severe left main disease, hypertension, dyslipidemia, diabetes mellitus, anemia, and aortic valve stenosis.     HPI:  This is a 63-year-old male here for follow-up regarding NSTEMI with  of his RCA and severe left main disease, hypertension, dyslipidemia, diabetes mellitus, anemia, and aortic valve stenosis.  He presented to the hospital with atypical symptoms of shakiness, diaphoresis, mild shortness of breath subsequently found to have elevating troponin of 135-114-246.  Patient was initially started on a heparin infusion and scheduled for a left heart catheterization.  Left heart catheterization done 10/2/2023 showed  of the RCA with severe disease of the left main.  In discussion with interventional cardiology it was recommended patient undergo outpatient bypass evaluation.  I had discussed possible inpatient transfer with the patient but he preferred to be discharged home.  Patient had been started on aspirin 81 mg daily, atorvastatin 80 mg daily, losartan 25 mg daily, carvedilol 3.125 mg twice daily.  Echocardiogram done 10/3/2023 showed an ejection fraction of 50%.  BMP done 10/4/2023 showed a serum sodium 134, serum potassium 3.9, serum creatinine of 0.90, serum magnesium of 1.80, CBC done on 10/3/23 showed a hemoglobin of 8.2. ECG done today showed sinus rhythm with a heart rate of 63 bpm. The patient reports that he has been feeling generally well from the cardiac standpoint since his hospitalization. He denies any new chest pain, shortness of breath, palpitations and lightheadedness.  He states  that he takes all of his medications as prescribed. During my exam, he was resting comfortably on the exam table.        Assessment/Plan:   CAD/NSTEMI  The patient was recently seen at the hospital with atypical symptoms of shakiness, diaphoresis, mild shortness of breath subsequently found to have elevating troponin of 135-114-246.   Left heart catheterization done 10/2/2023 showed  of the RCA with severe disease of the left main.   ECG done today showed sinus rhythm with a heart rate of 63 bpm.  He denies anginal chest discomfort.  Blood pressure appears controlled on exam today.  He should continue current antihypertensive medications and antiplatelet therapy.  I will refer him to CT surgery for bypass.   Echocardiogram done 10/3/2023 showed an ejection fraction of 50%.   Recent lab works as noted in the HPI.   No recent lipid panel on record. He is currently on atorvastatin 80 mg daily.  Lab works as noted below will be done in 3 months prior to his next visit.  Please see lifestyle recommendations below.  Follow up in 3 months and sooner if necessary.     2. Hypertension  The patient has a history of hypertension which appears controlled on exam today.  He should continue his current antihypertensive medications.    3. Dyslipidemia  No recent lipid panel on record.   He is currently on atorvastatin 80 mg daily.  I will update lipid panel in 3 months.  Please see lifestyle recommendations below.    4. Diabetes mellitus  Stable. Medication management as per PCP.    5. Anemia  CBC done in October 2023 showed a hemoglobin of 8.2.  Management as per PCP.    6. Aortic valve stenosis  The patient had a history of mild aortic valve stenosis seen on echocardiogram done in January 2023.  I will continue to monitor this clinically for now.         Orders:   CT surgery referral for bypass,   CMP/lipid/magnesium/CBC in 3 months,   Follow-up in 3 months.     Lifestyle Recommendations  I recommend a whole-food plant-based  diet, an eating pattern that encourages the consumption of unrefined plant foods (such as fruits, vegetables, tubers, whole grains, legumes, nuts and seeds) and discourages meats, dairy products, eggs and processed foods.     The AHA/ACC recommends that the patient consume a dietary pattern that emphasizes intake of vegetables, fruits, and whole grains; includes low-fat dairy products, poultry, fish, legumes, non-tropical vegetable oils, and nuts; and limits intake of sodium, sweets, sugar-sweetened beverages, and red meats.  Adapt this dietary pattern to appropriate calorie requirements (a 500-750 kcal/day deficit to loose weight), personal and cultural food preferences, and nutrition therapy for other medical conditions (including diabetes).  Achieve this pattern by following plans such as the [place] Mediterranean, DASH dietary pattern, or AHA diet.     Engage in 2 hours and 30 minutes per week of moderate-intensity physical activity, or 1 hour and 15 minutes (75 minutes) per week of vigorous-intensity aerobic physical activity, or an equivalent combination of moderate and vigorous-intensity aerobic physical activity. Aerobic activity should be performed in episodes of at least 10 minutes preferably spread throughout the week.     Adhering to a heart healthy diet, regular exercise habits, avoidance of tobacco products, and maintenance of a healthy weight are crucial components of their heart disease risk reduction.     Any positive review of systems not specifically addressed in the office visit today should be evaluated and treated by the patients primary care physician or in an emergency department if necessary     Patient was notified that results from ordered tests will be called to the patient if it changes current management; it will otherwise be discussed at a future appointment and available on [facility].     Thank you for allowing me to participate in the care of this patient.        This document was  generated using the assistance of voice recognition software. If there are any errors of spelling, grammar, syntax, or meaning; please feel free to contact me directly for clarification.      Past Medical History:  He has a past medical history of CVA (cerebral vascular accident) (CMS/[facility]), DM (diabetes mellitus) (CMS/[facility]), HLD (hyperlipidemia), and HTN (hypertension).    Past Surgical History:  He has a past surgical history that includes CT angio abdomen pelvis w and or wo IV IV contrast (1/30/2023) and Cardiac catheterization (N/A, 10/2/2023).      Social History:  He reports that he has quit smoking. His smoking use included cigarettes. He has never used smokeless tobacco. He reports current alcohol use of about 6.0 standard drinks of alcohol per week. He reports that he does not use drugs.    Family History:  No family history on file.     Allergies:  Penicillins and Penicillin v    Outpatient Medications:  Current Outpatient Medications   Medication Instructions    acetaminophen (Tylenol) 325 mg tablet 2 tablets, oral, Every 4 hours PRN    aspirin 81 mg, oral, Daily    atorvastatin (LIPITOR) 80 mg, oral, Daily    B complex-C-E-FA-Zn-lysine tablet 1 tablet, oral, Daily    carvedilol (COREG) 3.125 mg, oral, 2 times daily with meals    clopidogrel (PLAVIX) 75 mg, oral, Daily    cyanocobalamin (VITAMIN B-12) 1,000 mcg, oral, Daily    cyclobenzaprine (FLEXERIL) 5 mg, oral, Nightly    ergocalciferol (VITAMIN D-2) 50,000 Units, oral, Weekly    ferrous sulfate 325 (65 Fe) MG tablet 65 mg of iron, oral, Daily with breakfast    folic acid (FOLVITE) 1 mg, oral, Daily    gabapentin (NEURONTIN) 100 mg, oral, 3 times daily    melatonin 9 mg, oral, Nightly    metFORMIN (GLUCOPHAGE) 500 mg, oral, 2 times daily with meals    pantoprazole (PROTONIX) 40 mg, oral, Daily before breakfast    traMADol (ULTRAM) 50 mg, oral, Every 6 hours PRN    Vitamin C 500 mg, oral, Daily        ROS:  A 14 point review of systems was done and is  negative other than as stated in HPI    Vitals:      10/3/2023    12:02 PM 10/3/2023     4:20 PM 10/3/2023     7:35 PM 10/3/2023    11:22 PM 10/4/2023     2:49 AM 10/4/2023     6:00 AM 10/4/2023     8:40 AM   Vitals   Systolic 145 159 149 133 135  127   Diastolic 78 74 80 74 78  60   Heart Rate 71 65 81 64 66  68   Temp 36.7 °C (98.1 °F) 37.1 °C (98.7 °F) 36.7 °C (98 °F) 36.3 °C (97.3 °F) 36.6 °C (97.9 °F)  36.4 °C (97.5 °F)   Resp 16  16 16 16  16   Weight (lb)      196.65    BMI      27.43 kg/m2    BSA (m2)      2.11 m2         Physical Exam:     Constitutional: Cooperative, in no acute distress, alert, appears stated age.  Skin: Skin color, texture, turgor normal. No rashes or lesions.  Head: Normocephalic. No masses, lesions, tenderness or abnormalities  Eyes: Extraocular movements are grossly intact.  Mouth and throat: Mucous membranes moist  Neck: Neck supple, no carotid bruits, no JVD  Respiratory: Lungs clear to auscultation, no wheezing or rhonchi, no use of accessory muscles  Chest wall: No scars, normal excursion with respiration  Cardiovascular: Regular rhythm, +murmur  Gastrointestinal: Abdomen soft, nontender. Bowel sounds normal.  Musculoskeletal: Strength equal in upper extremities  Extremities: No pitting edema  Neurologic: Sensation grossly intact, alert and oriented ×3    Intake/Output:   No intake/output data recorded.    Outpatient Medications  Current Outpatient Medications on File Prior to Visit   Medication Sig Dispense Refill    acetaminophen (Tylenol) 325 mg tablet Take 2 tablets (650 mg) by mouth every 4 hours if needed.      aspirin 81 mg chewable tablet Chew 1 tablet (81 mg) once daily. 30 tablet 3    atorvastatin (Lipitor) 80 mg tablet Take 1 tablet (80 mg) by mouth once daily. 90 tablet 1    B complex-C-E-FA-Zn-lysine tablet Take 1 tablet by mouth once daily.      carvedilol (Coreg) 3.125 mg tablet Take 1 tablet (3.125 mg) by mouth 2 times a day with meals. 120 tablet 1    clopidogrel  (Plavix) 75 mg tablet Take 1 tablet (75 mg) by mouth once daily.      cyanocobalamin (Vitamin B-12) 1,000 mcg tablet Take 1 tablet (1,000 mcg) by mouth once daily. 30 tablet 90    cyclobenzaprine (Flexeril) 5 mg tablet Take 1 tablet (5 mg) by mouth once daily at bedtime.      ergocalciferol (Vitamin D-2) 1.25 MG (12547 UT) capsule Take 1 capsule (50,000 Units) by mouth 1 (one) time per week.      ferrous sulfate 325 (65 Fe) MG tablet Take 1 tablet (65 mg of iron) by mouth once daily with a meal. 90 tablet 1    folic acid (Folvite) 1 mg tablet Take 1 tablet (1 mg) by mouth once daily. 20 tablet 0    gabapentin (Neurontin) 100 mg capsule Take 1 capsule (100 mg) by mouth 3 times a day.      melatonin 3 mg tablet Take 3 tablets (9 mg) by mouth once daily at bedtime.      metFORMIN (Glucophage) 500 mg tablet Take 1 tablet (500 mg) by mouth 2 times a day with meals.      pantoprazole (ProtoNix) 40 mg EC tablet Take 1 tablet (40 mg) by mouth once daily in the morning. Take before meals.      traMADol (Ultram) 50 mg tablet Take 1 tablet (50 mg) by mouth every 6 hours if needed for moderate pain (4 - 6).      Vitamin C 500 mg tablet Take 1 tablet (500 mg) by mouth once daily.      [DISCONTINUED] glimepiride (Amaryl) 2 mg tablet Take 1 tablet (2 mg) by mouth once daily.      [DISCONTINUED] losartan (Cozaar) 50 mg tablet Take 1 tablet (50 mg) by mouth once daily.      [DISCONTINUED] multivitamin with minerals (multivitamin-iron-folic acid) tablet Take 1 tablet by mouth once daily.      [DISCONTINUED] vitamin B complex (B Complex-Vitamin B12) tablet Take 1 tablet by mouth once daily.       Current Facility-Administered Medications on File Prior to Visit   Medication Dose Route Frequency Provider Last Rate Last Admin    [DISCONTINUED] acetaminophen (Tylenol) oral liquid 650 mg  650 mg nasogastric tube q4h PRN Jose R Frias PA-C        [DISCONTINUED] acetaminophen (Tylenol) suppository 650 mg  650 mg rectal q4h PRN Jose R WEBER  CHRISTOFER Frias        [DISCONTINUED] acetaminophen (Tylenol) tablet 650 mg  650 mg oral q4h PRN Jose R Frias PA-C   650 mg at 10/04/23 0612    [DISCONTINUED] aspirin chewable tablet 81 mg  81 mg oral Daily Jose R Frias PA-C   81 mg at 10/04/23 0952    [DISCONTINUED] atorvastatin (Lipitor) tablet 80 mg  80 mg oral Daily Umesh Newby DO        [DISCONTINUED] carvedilol (Coreg) tablet 3.125 mg  3.125 mg oral BID with meals Umesh Newby DO   3.125 mg at 10/04/23 0952    [DISCONTINUED] cyanocobalamin (Vitamin B-12) tablet 1,000 mcg  1,000 mcg oral Daily Angel Panda MD   1,000 mcg at 10/04/23 0952    [DISCONTINUED] dextrose 10 % infusion  0.3 g/kg/hr intravenous Once PRN Eendelia Vieyra MD        [DISCONTINUED] dextrose 50 % injection 25 g  25 g intravenous q15 min PRN Enedelia Vieyra MD        [DISCONTINUED] ferrous sulfate 325 (65 Fe) MG tablet 65 mg of iron  65 mg of iron oral Daily with breakfast Angel Panda MD   65 mg of iron at 10/04/23 0952    [DISCONTINUED] folic acid (Folvite) tablet 1 mg  1 mg oral Daily Angel Panda MD   1 mg at 10/04/23 0952    [DISCONTINUED] glucagon (Glucagen) injection 1 mg  1 mg intramuscular q15 min PRN Enedelia Vieyra MD        [DISCONTINUED] heparin (porcine) injection 2,000-4,000 Units  2,000-4,000 Units intravenous q4h PRN Umesh Newby DO        [DISCONTINUED] heparin 25,000 Units in dextrose 5% 250 mL (100 Units/mL) infusion (premix)  0-4,000 Units/hr intravenous Continuous Umesh Newby DO 12 mL/hr at 10/02/23 0251 1,200 Units/hr at 10/02/23 0251    [DISCONTINUED] insulin lispro (HumaLOG) injection 0-10 Units  0-10 Units subcutaneous TID with meals Enedelia Vieyra MD   2 Units at 10/03/23 1704    [DISCONTINUED] lidocaine 4 % patch 1 patch  1 patch transdermal Daily Jerrica Carson PA-C   1 patch at 10/03/23 1007    [DISCONTINUED] losartan (Cozaar) tablet 25 mg  25 mg oral Daily Umesh Newby DO   25 mg at  10/04/23 0954    [DISCONTINUED] lubricating eye drops ophthalmic solution 1 drop  1 drop Both Eyes TID PRN Norbert Crump MD   1 drop at 10/04/23 0613    [DISCONTINUED] melatonin tablet 3 mg  3 mg oral Nightly PRN Jose R Frias PA-C   3 mg at 10/03/23 1953    [DISCONTINUED] ondansetron (Zofran) injection 4 mg  4 mg intravenous q6h PRN Jose R Frias PA-C        [DISCONTINUED] oxyCODONE (Roxicodone) immediate release tablet 5 mg  5 mg oral q6h PRN Jerrica Carson PA-C   5 mg at 10/04/23 0612    [DISCONTINUED] oxymetazoline (Afrin) 0.05 % nasal spray 2 spray  2 spray Each Nostril BID Angel Panda MD   2 spray at 10/04/23 0953    [DISCONTINUED] pantoprazole (ProtoNix) injection 40 mg  40 mg intravenous Daily before breakfast Jose R Frias PA-C   40 mg at 10/04/23 0613    [DISCONTINUED] perflutren lipid microspheres (Definity) injection 0.5 mL  0.5 mL intravenous Once in imaging Umesh Newby DO        [DISCONTINUED] polyethylene glycol (Glycolax, Miralax) packet 17 g  17 g oral Daily Jose R Frias PA-C        [DISCONTINUED] sulfur hexafluoride microsphr (Lumason) injection 24.28 mg  2 mL intravenous Once in imaging Umesh Newby DO        [DISCONTINUED] traMADol (Ultram) tablet 50 mg  50 mg oral BID Angel Panda MD   50 mg at 10/04/23 0954       Labs: (past 26 weeks)  Recent Results (from the past 4368 hour(s))   POCT glucose    Collection Time: 09/30/23  8:15 AM   Result Value Ref Range    POC Fingerstick Blood Glucose 44 (A) 70 - 100 mg/dl   POCT GLUCOSE    Collection Time: 09/30/23  8:16 AM   Result Value Ref Range    POCT Glucose 44 (L) 74 - 99 mg/dL   POCT GLUCOSE    Collection Time: 09/30/23  8:39 AM   Result Value Ref Range    POCT Glucose 156 (H) 74 - 99 mg/dL   POCT glucose    Collection Time: 09/30/23  8:40 AM   Result Value Ref Range    POC Fingerstick Blood Glucose 156 (A) 70 - 100 mg/dl   CBC and Auto Differential    Collection Time: 09/30/23  8:45 AM   Result Value Ref Range    WBC  7.0 4.4 - 11.3 x10*3/uL    nRBC 0.0 0.0 - 0.0 /100 WBCs    RBC 3.47 (L) 4.50 - 5.90 x10*6/uL    Hemoglobin 9.7 (L) 13.5 - 17.5 g/dL    Hematocrit 29.9 (L) 41.0 - 52.0 %    MCV 86 80 - 100 fL    MCH 28.0 26.0 - 34.0 pg    MCHC 32.4 32.0 - 36.0 g/dL    RDW 17.3 (H) 11.5 - 14.5 %    Platelets 324 150 - 450 x10*3/uL    MPV 9.0 7.5 - 11.5 fL    Neutrophils % 75.4 40.0 - 80.0 %    Immature Granulocytes %, Automated 0.4 0.0 - 0.9 %    Lymphocytes % 14.0 13.0 - 44.0 %    Monocytes % 6.9 2.0 - 10.0 %    Eosinophils % 2.4 0.0 - 6.0 %    Basophils % 0.9 0.0 - 2.0 %    Neutrophils Absolute 5.26 1.20 - 7.70 x10*3/uL    Immature Granulocytes Absolute, Automated 0.03 0.00 - 0.70 x10*3/uL    Lymphocytes Absolute 0.98 (L) 1.20 - 4.80 x10*3/uL    Monocytes Absolute 0.48 0.10 - 1.00 x10*3/uL    Eosinophils Absolute 0.17 0.00 - 0.70 x10*3/uL    Basophils Absolute 0.06 0.00 - 0.10 x10*3/uL   Comprehensive metabolic panel    Collection Time: 09/30/23  8:45 AM   Result Value Ref Range    Glucose 221 (H) 74 - 99 mg/dL    Sodium 132 (L) 136 - 145 mmol/L    Potassium 5.4 (H) 3.5 - 5.3 mmol/L    Chloride 99 98 - 107 mmol/L    Bicarbonate 22 21 - 32 mmol/L    Anion Gap 16 10 - 20 mmol/L    Urea Nitrogen 10 6 - 23 mg/dL    Creatinine 0.89 0.50 - 1.30 mg/dL    eGFR >90 >60 mL/min/1.73m*2    Calcium 9.4 8.6 - 10.3 mg/dL    Albumin 4.4 3.4 - 5.0 g/dL    Alkaline Phosphatase 86 33 - 136 U/L    Total Protein 7.9 6.4 - 8.2 g/dL    AST 32 9 - 39 U/L    Bilirubin, Total 1.0 0.0 - 1.2 mg/dL    ALT 19 10 - 52 U/L   Troponin I, High Sensitivity    Collection Time: 09/30/23  8:45 AM   Result Value Ref Range    Troponin I, High Sensitivity 135 (HH) 0 - 20 ng/L   Protime-INR    Collection Time: 09/30/23  8:45 AM   Result Value Ref Range    Protime 11.8 9.8 - 12.8 seconds    INR 1.0 0.9 - 1.1   APTT    Collection Time: 09/30/23  8:45 AM   Result Value Ref Range    aPTT 28 27 - 38 seconds   POCT glucose    Collection Time: 09/30/23  9:17 AM   Result Value Ref  Range    POC Fingerstick Blood Glucose 152 (A) 70 - 100 mg/dl   POCT GLUCOSE    Collection Time: 09/30/23  9:18 AM   Result Value Ref Range    POCT Glucose 152 (H) 74 - 99 mg/dL   Troponin I, High Sensitivity    Collection Time: 09/30/23 12:35 PM   Result Value Ref Range    Troponin I, High Sensitivity 114 (HH) 0 - 20 ng/L   POCT GLUCOSE    Collection Time: 09/30/23  1:00 PM   Result Value Ref Range    POCT Glucose 104 (H) 74 - 99 mg/dL   Troponin I, High Sensitivity    Collection Time: 09/30/23  3:54 PM   Result Value Ref Range    Troponin I, High Sensitivity 246 (HH) 0 - 20 ng/L   Hemoglobin A1c    Collection Time: 09/30/23  3:54 PM   Result Value Ref Range    Hemoglobin A1C 5.6 see below %    Estimated Average Glucose 114 Not Established mg/dL   POCT GLUCOSE    Collection Time: 09/30/23  5:00 PM   Result Value Ref Range    POCT Glucose 74 74 - 99 mg/dL   POCT GLUCOSE    Collection Time: 09/30/23  9:51 PM   Result Value Ref Range    POCT Glucose 231 (H) 74 - 99 mg/dL   POCT GLUCOSE    Collection Time: 09/30/23 10:44 PM   Result Value Ref Range    POCT Glucose 228 (H) 74 - 99 mg/dL   POCT GLUCOSE    Collection Time: 09/30/23 11:52 PM   Result Value Ref Range    POCT Glucose 240 (H) 74 - 99 mg/dL   Comprehensive metabolic panel    Collection Time: 10/01/23  4:48 AM   Result Value Ref Range    Glucose 176 (H) 74 - 99 mg/dL    Sodium 134 (L) 136 - 145 mmol/L    Potassium 4.1 3.5 - 5.3 mmol/L    Chloride 102 98 - 107 mmol/L    Bicarbonate 25 21 - 32 mmol/L    Anion Gap 11 10 - 20 mmol/L    Urea Nitrogen 17 6 - 23 mg/dL    Creatinine 1.08 0.50 - 1.30 mg/dL    eGFR 77 >60 mL/min/1.73m*2    Calcium 8.8 8.6 - 10.3 mg/dL    Albumin 3.6 3.4 - 5.0 g/dL    Alkaline Phosphatase 71 33 - 136 U/L    Total Protein 6.5 6.4 - 8.2 g/dL    AST 22 9 - 39 U/L    Bilirubin, Total 0.8 0.0 - 1.2 mg/dL    ALT 14 10 - 52 U/L   CBC and Auto Differential    Collection Time: 10/01/23  4:48 AM   Result Value Ref Range    WBC 7.3 4.4 - 11.3  x10*3/uL    nRBC 0.0 0.0 - 0.0 /100 WBCs    RBC 3.00 (L) 4.50 - 5.90 x10*6/uL    Hemoglobin 8.1 (L) 13.5 - 17.5 g/dL    Hematocrit 25.7 (L) 41.0 - 52.0 %    MCV 86 80 - 100 fL    MCH 27.0 26.0 - 34.0 pg    MCHC 31.5 (L) 32.0 - 36.0 g/dL    RDW 17.4 (H) 11.5 - 14.5 %    Platelets 295 150 - 450 x10*3/uL    MPV 9.5 7.5 - 11.5 fL    Neutrophils % 67.8 40.0 - 80.0 %    Immature Granulocytes %, Automated 0.4 0.0 - 0.9 %    Lymphocytes % 18.5 13.0 - 44.0 %    Monocytes % 9.4 2.0 - 10.0 %    Eosinophils % 3.1 0.0 - 6.0 %    Basophils % 0.8 0.0 - 2.0 %    Neutrophils Absolute 4.97 1.20 - 7.70 x10*3/uL    Immature Granulocytes Absolute, Automated 0.03 0.00 - 0.70 x10*3/uL    Lymphocytes Absolute 1.36 1.20 - 4.80 x10*3/uL    Monocytes Absolute 0.69 0.10 - 1.00 x10*3/uL    Eosinophils Absolute 0.23 0.00 - 0.70 x10*3/uL    Basophils Absolute 0.06 0.00 - 0.10 x10*3/uL   POCT GLUCOSE    Collection Time: 10/01/23  8:13 AM   Result Value Ref Range    POCT Glucose 143 (H) 74 - 99 mg/dL   Heparin Assay, UFH    Collection Time: 10/01/23 12:15 PM   Result Value Ref Range    Heparin Unfractionated <0.1 See Comment Below for Therapeutic Ranges IU/mL   aPTT - baseline    Collection Time: 10/01/23 12:15 PM   Result Value Ref Range    aPTT 31 27 - 38 seconds   Platelet count - baseline    Collection Time: 10/01/23 12:15 PM   Result Value Ref Range    Platelets 268 150 - 450 x10*3/uL   POCT GLUCOSE    Collection Time: 10/01/23 12:46 PM   Result Value Ref Range    POCT Glucose 234 (H) 74 - 99 mg/dL   POCT GLUCOSE    Collection Time: 10/01/23  4:13 PM   Result Value Ref Range    POCT Glucose 210 (H) 74 - 99 mg/dL   POCT GLUCOSE    Collection Time: 10/01/23  7:48 PM   Result Value Ref Range    POCT Glucose 199 (H) 74 - 99 mg/dL   Platelet count - HIT surveillance    Collection Time: 10/02/23  4:46 AM   Result Value Ref Range    Platelets 283 150 - 450 x10*3/uL   Magnesium    Collection Time: 10/02/23  4:46 AM   Result Value Ref Range     Magnesium 1.76 1.60 - 2.40 mg/dL   Basic Metabolic Panel    Collection Time: 10/02/23  4:46 AM   Result Value Ref Range    Glucose 112 (H) 74 - 99 mg/dL    Sodium 136 136 - 145 mmol/L    Potassium 3.8 3.5 - 5.3 mmol/L    Chloride 105 98 - 107 mmol/L    Bicarbonate 25 21 - 32 mmol/L    Anion Gap 10 10 - 20 mmol/L    Urea Nitrogen 13 6 - 23 mg/dL    Creatinine 0.83 0.50 - 1.30 mg/dL    eGFR >90 >60 mL/min/1.73m*2    Calcium 8.9 8.6 - 10.3 mg/dL   CBC and Auto Differential    Collection Time: 10/02/23  4:46 AM   Result Value Ref Range    WBC 7.8 4.4 - 11.3 x10*3/uL    nRBC 0.0 0.0 - 0.0 /100 WBCs    RBC 3.05 (L) 4.50 - 5.90 x10*6/uL    Hemoglobin 8.6 (L) 13.5 - 17.5 g/dL    Hematocrit 26.8 (L) 41.0 - 52.0 %    MCV 88 80 - 100 fL    MCH 28.2 26.0 - 34.0 pg    MCHC 32.1 32.0 - 36.0 g/dL    RDW 17.6 (H) 11.5 - 14.5 %    Platelets 283 150 - 450 x10*3/uL    MPV 9.4 7.5 - 11.5 fL    Neutrophils % 62.8 40.0 - 80.0 %    Immature Granulocytes %, Automated 0.4 0.0 - 0.9 %    Lymphocytes % 22.6 13.0 - 44.0 %    Monocytes % 8.6 2.0 - 10.0 %    Eosinophils % 5.0 0.0 - 6.0 %    Basophils % 0.6 0.0 - 2.0 %    Neutrophils Absolute 4.90 1.20 - 7.70 x10*3/uL    Immature Granulocytes Absolute, Automated 0.03 0.00 - 0.70 x10*3/uL    Lymphocytes Absolute 1.76 1.20 - 4.80 x10*3/uL    Monocytes Absolute 0.67 0.10 - 1.00 x10*3/uL    Eosinophils Absolute 0.39 0.00 - 0.70 x10*3/uL    Basophils Absolute 0.05 0.00 - 0.10 x10*3/uL   Iron and TIBC    Collection Time: 10/02/23  4:46 AM   Result Value Ref Range    Iron 41 35 - 150 ug/dL    UIBC 375 (H) 110 - 370 ug/dL    TIBC 416 240 - 445 ug/dL    % Saturation 10 (L) 25 - 45 %   Vitamin B12    Collection Time: 10/02/23  4:46 AM   Result Value Ref Range    Vitamin B12 286 211 - 911 pg/mL   POCT GLUCOSE    Collection Time: 10/02/23  8:36 AM   Result Value Ref Range    POCT Glucose 134 (H) 74 - 99 mg/dL   POCT GLUCOSE    Collection Time: 10/02/23 11:03 AM   Result Value Ref Range    POCT Glucose 146  (H) 74 - 99 mg/dL   Transthoracic Echo (TTE) Limited    Collection Time: 10/02/23 12:02 PM   Result Value Ref Range    LV A4C EF 51.9    POCT GLUCOSE    Collection Time: 10/02/23  3:12 PM   Result Value Ref Range    POCT Glucose 132 (H) 74 - 99 mg/dL   POCT GLUCOSE    Collection Time: 10/02/23  8:59 PM   Result Value Ref Range    POCT Glucose 125 (H) 74 - 99 mg/dL   Magnesium    Collection Time: 10/03/23  3:28 AM   Result Value Ref Range    Magnesium 1.79 1.60 - 2.40 mg/dL   Basic Metabolic Panel    Collection Time: 10/03/23  3:28 AM   Result Value Ref Range    Glucose 90 74 - 99 mg/dL    Sodium 134 (L) 136 - 145 mmol/L    Potassium 3.9 3.5 - 5.3 mmol/L    Chloride 105 98 - 107 mmol/L    Bicarbonate 21 21 - 32 mmol/L    Anion Gap 12 10 - 20 mmol/L    Urea Nitrogen 13 6 - 23 mg/dL    Creatinine 0.86 0.50 - 1.30 mg/dL    eGFR >90 >60 mL/min/1.73m*2    Calcium 8.7 8.6 - 10.3 mg/dL   CBC and Auto Differential    Collection Time: 10/03/23  3:28 AM   Result Value Ref Range    WBC 7.6 4.4 - 11.3 x10*3/uL    nRBC 0.0 0.0 - 0.0 /100 WBCs    RBC 2.98 (L) 4.50 - 5.90 x10*6/uL    Hemoglobin 8.2 (L) 13.5 - 17.5 g/dL    Hematocrit 26.2 (L) 41.0 - 52.0 %    MCV 88 80 - 100 fL    MCH 27.5 26.0 - 34.0 pg    MCHC 31.3 (L) 32.0 - 36.0 g/dL    RDW 17.2 (H) 11.5 - 14.5 %    Platelets 276 150 - 450 x10*3/uL    MPV 9.2 7.5 - 11.5 fL    Neutrophils % 63.0 40.0 - 80.0 %    Immature Granulocytes %, Automated 0.3 0.0 - 0.9 %    Lymphocytes % 20.5 13.0 - 44.0 %    Monocytes % 9.3 2.0 - 10.0 %    Eosinophils % 6.2 0.0 - 6.0 %    Basophils % 0.7 0.0 - 2.0 %    Neutrophils Absolute 4.81 1.20 - 7.70 x10*3/uL    Immature Granulocytes Absolute, Automated 0.02 0.00 - 0.70 x10*3/uL    Lymphocytes Absolute 1.56 1.20 - 4.80 x10*3/uL    Monocytes Absolute 0.71 0.10 - 1.00 x10*3/uL    Eosinophils Absolute 0.47 0.00 - 0.70 x10*3/uL    Basophils Absolute 0.05 0.00 - 0.10 x10*3/uL   POCT GLUCOSE    Collection Time: 10/03/23  8:31 AM   Result Value Ref Range     POCT Glucose 103 (H) 74 - 99 mg/dL   POCT GLUCOSE    Collection Time: 10/03/23 12:04 PM   Result Value Ref Range    POCT Glucose 117 (H) 74 - 99 mg/dL   POCT GLUCOSE    Collection Time: 10/03/23  4:19 PM   Result Value Ref Range    POCT Glucose 158 (H) 74 - 99 mg/dL   POCT GLUCOSE    Collection Time: 10/03/23  7:37 PM   Result Value Ref Range    POCT Glucose 191 (H) 74 - 99 mg/dL   Magnesium    Collection Time: 10/04/23  6:12 AM   Result Value Ref Range    Magnesium 1.80 1.60 - 2.40 mg/dL   Basic Metabolic Panel    Collection Time: 10/04/23  6:12 AM   Result Value Ref Range    Glucose 86 74 - 99 mg/dL    Sodium 136 136 - 145 mmol/L    Potassium 3.9 3.5 - 5.3 mmol/L    Chloride 105 98 - 107 mmol/L    Bicarbonate 23 21 - 32 mmol/L    Anion Gap 12 10 - 20 mmol/L    Urea Nitrogen 17 6 - 23 mg/dL    Creatinine 0.90 0.50 - 1.30 mg/dL    eGFR >90 >60 mL/min/1.73m*2    Calcium 8.9 8.6 - 10.3 mg/dL   CBC and Auto Differential    Collection Time: 10/04/23  6:12 AM   Result Value Ref Range    WBC 7.4 4.4 - 11.3 x10*3/uL    nRBC 0.0 0.0 - 0.0 /100 WBCs    RBC 3.26 (L) 4.50 - 5.90 x10*6/uL    Hemoglobin 9.0 (L) 13.5 - 17.5 g/dL    Hematocrit 28.8 (L) 41.0 - 52.0 %    MCV 88 80 - 100 fL    MCH 27.6 26.0 - 34.0 pg    MCHC 31.3 (L) 32.0 - 36.0 g/dL    RDW 17.2 (H) 11.5 - 14.5 %    Platelets 292 150 - 450 x10*3/uL    MPV 8.9 7.5 - 11.5 fL    Neutrophils % 61.3 40.0 - 80.0 %    Immature Granulocytes %, Automated 0.4 0.0 - 0.9 %    Lymphocytes % 20.2 13.0 - 44.0 %    Monocytes % 10.1 2.0 - 10.0 %    Eosinophils % 7.3 0.0 - 6.0 %    Basophils % 0.7 0.0 - 2.0 %    Neutrophils Absolute 4.53 1.20 - 7.70 x10*3/uL    Immature Granulocytes Absolute, Automated 0.03 0.00 - 0.70 x10*3/uL    Lymphocytes Absolute 1.49 1.20 - 4.80 x10*3/uL    Monocytes Absolute 0.75 0.10 - 1.00 x10*3/uL    Eosinophils Absolute 0.54 0.00 - 0.70 x10*3/uL    Basophils Absolute 0.05 0.00 - 0.10 x10*3/uL   POCT GLUCOSE    Collection Time: 10/04/23  8:42 AM   Result  Value Ref Range    POCT Glucose 89 74 - 99 mg/dL   POCT GLUCOSE    Collection Time: 10/04/23 12:14 PM   Result Value Ref Range    POCT Glucose 127 (H) 74 - 99 mg/dL       ECG  No results found for this or any previous visit (from the past 4464 hour(s)).    Echocardiogram  No results found for this or any previous visit from the past 1095 days.    Problem List Items Addressed This Visit       HTN (hypertension) - Primary (Chronic)    HLD (hyperlipidemia) (Chronic)    NSTEMI (non-ST elevated myocardial infarction) (CMS/[facility])    Diabetes mellitus type II, non insulin dependent (CMS/[facility])    Iron deficiency anemia secondary to inadequate dietary iron intake    Aortic valve disease            Umesh Newby DO, FACC, FACOI

## 2023-10-06 ENCOUNTER — TELEPHONE (OUTPATIENT)
Dept: CARDIOLOGY | Facility: CLINIC | Age: 63
End: 2023-10-06
Payer: COMMERCIAL

## 2023-10-09 ENCOUNTER — TELEPHONE (OUTPATIENT)
Dept: CARDIOLOGY | Facility: CLINIC | Age: 63
End: 2023-10-09
Payer: COMMERCIAL

## 2023-10-09 DIAGNOSIS — E08.00 DIABETES MELLITUS DUE TO UNDERLYING CONDITION WITH HYPEROSMOLARITY WITHOUT COMA, UNSPECIFIED WHETHER LONG TERM INSULIN USE (HCC): ICD-10-CM

## 2023-10-09 PROBLEM — E11.621: Chronic | Status: ACTIVE | Noted: 2021-12-21

## 2023-10-09 PROBLEM — E55.9 VITAMIN D DEFICIENCY: Status: ACTIVE | Noted: 2017-05-01

## 2023-10-09 PROBLEM — G47.00 INSOMNIA: Status: ACTIVE | Noted: 2017-05-01

## 2023-10-09 PROBLEM — I63.9 CEREBROVASCULAR ACCIDENT (MULTI): Status: ACTIVE | Noted: 2017-05-01

## 2023-10-09 PROBLEM — M86.9 OSTEOMYELITIS (MULTI): Status: ACTIVE | Noted: 2021-05-14

## 2023-10-09 PROBLEM — R03.0 FINDING OF ABOVE NORMAL BLOOD PRESSURE: Status: ACTIVE | Noted: 2023-02-02

## 2023-10-09 PROBLEM — M54.9 CHRONIC BACK PAIN: Status: ACTIVE | Noted: 2018-10-09

## 2023-10-09 PROBLEM — L97.424: Chronic | Status: ACTIVE | Noted: 2021-12-21

## 2023-10-09 PROBLEM — K26.4 DUODENAL ULCER WITH HEMORRHAGE: Status: ACTIVE | Noted: 2023-05-12

## 2023-10-09 PROBLEM — I73.9 PERIPHERAL ARTERIAL DISEASE (CMS-HCC): Status: ACTIVE | Noted: 2021-02-19

## 2023-10-09 PROBLEM — I70.244: Status: ACTIVE | Noted: 2021-12-21

## 2023-10-09 PROBLEM — Z98.62 PERIPHERAL VASCULAR ANGIOPLASTY STATUS: Status: ACTIVE | Noted: 2021-12-21

## 2023-10-09 PROBLEM — I25.10 CORONARY ARTERY DISEASE INVOLVING NATIVE CORONARY ARTERY OF NATIVE HEART WITHOUT ANGINA PECTORIS: Status: ACTIVE | Noted: 2022-09-16

## 2023-10-09 PROBLEM — G89.29 CHRONIC BACK PAIN: Status: ACTIVE | Noted: 2018-10-09

## 2023-10-09 PROBLEM — M86.9: Status: ACTIVE | Noted: 2022-09-15

## 2023-10-09 PROBLEM — Z66 DO NOT RESUSCITATE: Status: ACTIVE | Noted: 2023-02-02

## 2023-10-09 PROBLEM — I69.354 HEMIPARESIS AFFECTING LEFT SIDE AS LATE EFFECT OF CEREBROVASCULAR ACCIDENT (MULTI): Status: ACTIVE | Noted: 2023-10-09

## 2023-10-09 RX ORDER — COLLAGENASE SANTYL 250 [ARB'U]/G
OINTMENT TOPICAL
COMMUNITY

## 2023-10-09 RX ORDER — GLIMEPIRIDE 2 MG/1
2 TABLET ORAL
Qty: 30 TABLET | Refills: 2 | Status: SHIPPED | OUTPATIENT
Start: 2023-10-09

## 2023-10-09 NOTE — TELEPHONE ENCOUNTER
10/9/23  1209  Called patient for TCM call.  Discussed     Upcoming visit with Dr. Newby  Diet: Low sodium heart healthy diet  Medications:  Aspirin 81 mg daily  Lipitor 80 mg daily  Coreg 3/125 mg twice daily  Vitamin B 12-1,000 mcg daily  Iron 325 mg daily  Folic acid 1 mg daily  Plavix 75 mg daily  Metformin 500 mg twice daily  Protonix 40 mg daily  Melatonin 3 mg at HS  Vitamin C 500 mg daily    As needed Medications  Tylenol  Ultram   Flexiril Gabapentin    Patient denies any further needs at this time

## 2023-10-10 ENCOUNTER — OFFICE VISIT (OUTPATIENT)
Dept: CARDIOLOGY | Facility: CLINIC | Age: 63
End: 2023-10-10
Payer: COMMERCIAL

## 2023-10-10 VITALS
HEIGHT: 71 IN | SYSTOLIC BLOOD PRESSURE: 100 MMHG | HEART RATE: 63 BPM | BODY MASS INDEX: 28.42 KG/M2 | WEIGHT: 203 LBS | DIASTOLIC BLOOD PRESSURE: 78 MMHG

## 2023-10-10 DIAGNOSIS — I10 PRIMARY HYPERTENSION: Primary | Chronic | ICD-10-CM

## 2023-10-10 DIAGNOSIS — I35.9 AORTIC VALVE DISEASE: ICD-10-CM

## 2023-10-10 DIAGNOSIS — E11.9 DIABETES MELLITUS TYPE II, NON INSULIN DEPENDENT (MULTI): ICD-10-CM

## 2023-10-10 DIAGNOSIS — I21.4 NSTEMI (NON-ST ELEVATED MYOCARDIAL INFARCTION) (MULTI): ICD-10-CM

## 2023-10-10 DIAGNOSIS — E78.5 HYPERLIPIDEMIA, UNSPECIFIED HYPERLIPIDEMIA TYPE: Chronic | ICD-10-CM

## 2023-10-10 DIAGNOSIS — D50.8 IRON DEFICIENCY ANEMIA SECONDARY TO INADEQUATE DIETARY IRON INTAKE: ICD-10-CM

## 2023-10-10 PROCEDURE — 99215 OFFICE O/P EST HI 40 MIN: CPT | Performed by: INTERNAL MEDICINE

## 2023-10-10 PROCEDURE — 3074F SYST BP LT 130 MM HG: CPT | Performed by: INTERNAL MEDICINE

## 2023-10-10 PROCEDURE — 93000 ELECTROCARDIOGRAM COMPLETE: CPT | Performed by: INTERNAL MEDICINE

## 2023-10-10 PROCEDURE — 1036F TOBACCO NON-USER: CPT | Performed by: INTERNAL MEDICINE

## 2023-10-10 PROCEDURE — 3078F DIAST BP <80 MM HG: CPT | Performed by: INTERNAL MEDICINE

## 2023-10-10 PROCEDURE — 3044F HG A1C LEVEL LT 7.0%: CPT | Performed by: INTERNAL MEDICINE

## 2023-10-13 RX ORDER — CYCLOBENZAPRINE HCL 5 MG
TABLET ORAL
Qty: 30 TABLET | Refills: 2 | Status: SHIPPED | OUTPATIENT
Start: 2023-10-13

## 2023-10-18 DIAGNOSIS — E11.649 TYPE 2 DIABETES MELLITUS WITH HYPOGLYCEMIA WITHOUT COMA, WITHOUT LONG-TERM CURRENT USE OF INSULIN (MULTI): ICD-10-CM

## 2023-10-19 RX ORDER — FOLIC ACID 1 MG/1
1 TABLET ORAL DAILY
Qty: 20 TABLET | Refills: 0 | Status: SHIPPED | OUTPATIENT
Start: 2023-10-19 | End: 2023-11-06

## 2023-10-27 ENCOUNTER — APPOINTMENT (OUTPATIENT)
Dept: CARDIAC SURGERY | Facility: HOSPITAL | Age: 63
End: 2023-10-27
Payer: COMMERCIAL

## 2023-11-02 DIAGNOSIS — K21.9 GASTROESOPHAGEAL REFLUX DISEASE WITHOUT ESOPHAGITIS: ICD-10-CM

## 2023-11-02 DIAGNOSIS — I10 PRIMARY HYPERTENSION: ICD-10-CM

## 2023-11-02 RX ORDER — LOSARTAN POTASSIUM 50 MG/1
50 TABLET ORAL DAILY
Qty: 90 TABLET | Refills: 1 | Status: SHIPPED | OUTPATIENT
Start: 2023-11-02

## 2023-11-02 RX ORDER — PANTOPRAZOLE SODIUM 40 MG/1
TABLET, DELAYED RELEASE ORAL
Qty: 90 TABLET | Refills: 1 | Status: SHIPPED | OUTPATIENT
Start: 2023-11-02

## 2023-11-03 ENCOUNTER — OFFICE VISIT (OUTPATIENT)
Dept: CARDIAC SURGERY | Facility: HOSPITAL | Age: 63
End: 2023-11-03
Payer: COMMERCIAL

## 2023-11-03 VITALS
HEART RATE: 75 BPM | BODY MASS INDEX: 28.03 KG/M2 | DIASTOLIC BLOOD PRESSURE: 74 MMHG | SYSTOLIC BLOOD PRESSURE: 186 MMHG | OXYGEN SATURATION: 95 % | WEIGHT: 201 LBS

## 2023-11-03 DIAGNOSIS — I25.10 CORONARY ARTERY DISEASE INVOLVING NATIVE CORONARY ARTERY OF NATIVE HEART WITHOUT ANGINA PECTORIS: Primary | ICD-10-CM

## 2023-11-03 PROCEDURE — 1036F TOBACCO NON-USER: CPT | Performed by: THORACIC SURGERY (CARDIOTHORACIC VASCULAR SURGERY)

## 2023-11-03 PROCEDURE — 99213 OFFICE O/P EST LOW 20 MIN: CPT | Performed by: THORACIC SURGERY (CARDIOTHORACIC VASCULAR SURGERY)

## 2023-11-03 PROCEDURE — 3044F HG A1C LEVEL LT 7.0%: CPT | Performed by: THORACIC SURGERY (CARDIOTHORACIC VASCULAR SURGERY)

## 2023-11-03 PROCEDURE — 3078F DIAST BP <80 MM HG: CPT | Performed by: THORACIC SURGERY (CARDIOTHORACIC VASCULAR SURGERY)

## 2023-11-03 PROCEDURE — 3077F SYST BP >= 140 MM HG: CPT | Performed by: THORACIC SURGERY (CARDIOTHORACIC VASCULAR SURGERY)

## 2023-11-03 PROCEDURE — 99203 OFFICE O/P NEW LOW 30 MIN: CPT | Performed by: THORACIC SURGERY (CARDIOTHORACIC VASCULAR SURGERY)

## 2023-11-03 ASSESSMENT — PATIENT HEALTH QUESTIONNAIRE - PHQ9
SUM OF ALL RESPONSES TO PHQ9 QUESTIONS 1 AND 2: 1
2. FEELING DOWN, DEPRESSED OR HOPELESS: SEVERAL DAYS
1. LITTLE INTEREST OR PLEASURE IN DOING THINGS: NOT AT ALL

## 2023-11-03 ASSESSMENT — PAIN SCALES - GENERAL: PAINLEVEL: 0-NO PAIN

## 2023-11-06 NOTE — PROGRESS NOTES
I reviewed him today.    In summary 63 years of age who has had NSTEMI with a  of the RCA and left main disease was referred for assessment for coronary artery bypass grafting.  He has no history of hypertension dyslipidemia diabetes and anemia.    An echo demonstrated an ejection fraction of 50%.    On questioning today he said that his walking is limited because  Of his left foot for which she had operative intervention relating to diabetic disease.  Also noted that he has had CVA 11 years ago which left him with some residual left-sided weakness and he admits to alcohol excess.    Discussed the risks and benefits of surgery with him in detail.  I explained that he will need additional investigations including CT of the head and chest as well as carotid and full vascular work-up and pulmonary function tests in view of his previous history of smoking.    After detailed discussion he wanted to go away and think about it before committing to proceeding to surgery.  I offered to follow-up with him however his choice was that he will call my office once he has made up his mind.  I am happy to see him again at any point if required.    Thank you for your kind referral and for the opportunity to contribute to his care

## 2023-11-15 DIAGNOSIS — I25.10 CORONARY ARTERY DISEASE INVOLVING NATIVE CORONARY ARTERY OF NATIVE HEART WITHOUT ANGINA PECTORIS: ICD-10-CM

## 2023-11-15 DIAGNOSIS — I70.244 ATHEROSCLEROSIS OF NATIVE ARTERY OF LEFT LOWER EXTREMITY WITH ULCERATION OF HEEL (MULTI): ICD-10-CM

## 2023-11-15 DIAGNOSIS — I65.9 OCCLUSION AND STENOSIS OF UNSPECIFIED PRECEREBRAL ARTERY: ICD-10-CM

## 2023-11-15 DIAGNOSIS — I63.9 CEREBROVASCULAR ACCIDENT (CVA), UNSPECIFIED MECHANISM (MULTI): Primary | ICD-10-CM

## 2023-11-15 DIAGNOSIS — Z01.818 PRE-OP EVALUATION: ICD-10-CM

## 2023-11-15 RX ORDER — FOLIC ACID 1 MG/1
1000 TABLET ORAL DAILY
COMMUNITY
Start: 2023-10-19

## 2023-11-15 RX ORDER — ASPIRIN 81 MG
TABLET,CHEWABLE ORAL
COMMUNITY
Start: 2023-10-30 | End: 2023-11-15

## 2023-11-15 RX ORDER — CARVEDILOL 3.12 MG/1
3.12 TABLET ORAL 2 TIMES DAILY WITH MEALS
COMMUNITY
Start: 2023-10-04

## 2023-11-16 ENCOUNTER — OFFICE VISIT (OUTPATIENT)
Dept: PRIMARY CARE CLINIC | Age: 63
End: 2023-11-16
Payer: COMMERCIAL

## 2023-11-16 VITALS
DIASTOLIC BLOOD PRESSURE: 78 MMHG | OXYGEN SATURATION: 96 % | SYSTOLIC BLOOD PRESSURE: 138 MMHG | HEIGHT: 71 IN | TEMPERATURE: 97.2 F | HEART RATE: 80 BPM | BODY MASS INDEX: 25.1 KG/M2

## 2023-11-16 DIAGNOSIS — G89.29 CHRONIC BILATERAL LOW BACK PAIN WITHOUT SCIATICA: ICD-10-CM

## 2023-11-16 DIAGNOSIS — F51.01 PRIMARY INSOMNIA: ICD-10-CM

## 2023-11-16 DIAGNOSIS — I25.10 CORONARY ARTERY DISEASE INVOLVING NATIVE CORONARY ARTERY OF NATIVE HEART WITHOUT ANGINA PECTORIS: ICD-10-CM

## 2023-11-16 DIAGNOSIS — L97.424 DIABETIC ULCER OF LEFT HEEL ASSOCIATED WITH TYPE 2 DIABETES MELLITUS, WITH NECROSIS OF BONE (HCC): Primary | ICD-10-CM

## 2023-11-16 DIAGNOSIS — M54.50 CHRONIC BILATERAL LOW BACK PAIN WITHOUT SCIATICA: ICD-10-CM

## 2023-11-16 DIAGNOSIS — I10 PRIMARY HYPERTENSION: ICD-10-CM

## 2023-11-16 DIAGNOSIS — I63.9 CEREBROVASCULAR ACCIDENT (CVA), UNSPECIFIED MECHANISM (HCC): ICD-10-CM

## 2023-11-16 DIAGNOSIS — E11.621 DIABETIC ULCER OF LEFT HEEL ASSOCIATED WITH TYPE 2 DIABETES MELLITUS, WITH NECROSIS OF BONE (HCC): Primary | ICD-10-CM

## 2023-11-16 PROBLEM — M86.9 OSTEOMYELITIS (HCC): Status: ACTIVE | Noted: 2021-05-14

## 2023-11-16 PROBLEM — I21.9 MYOCARDIAL INFARCTION (HCC): Status: ACTIVE | Noted: 2023-09-30

## 2023-11-16 PROBLEM — D50.8 IRON DEFICIENCY ANEMIA SECONDARY TO INADEQUATE DIETARY IRON INTAKE: Status: ACTIVE | Noted: 2023-10-04

## 2023-11-16 PROBLEM — E78.5 HYPERLIPIDEMIA: Status: ACTIVE | Noted: 2023-02-02

## 2023-11-16 PROBLEM — K26.4 DUODENAL ULCER WITH HEMORRHAGE: Status: RESOLVED | Noted: 2023-05-12 | Resolved: 2023-11-16

## 2023-11-16 PROCEDURE — 99214 OFFICE O/P EST MOD 30 MIN: CPT | Performed by: STUDENT IN AN ORGANIZED HEALTH CARE EDUCATION/TRAINING PROGRAM

## 2023-11-16 PROCEDURE — G8427 DOCREV CUR MEDS BY ELIG CLIN: HCPCS | Performed by: STUDENT IN AN ORGANIZED HEALTH CARE EDUCATION/TRAINING PROGRAM

## 2023-11-16 PROCEDURE — 2022F DILAT RTA XM EVC RTNOPTHY: CPT | Performed by: STUDENT IN AN ORGANIZED HEALTH CARE EDUCATION/TRAINING PROGRAM

## 2023-11-16 PROCEDURE — 3078F DIAST BP <80 MM HG: CPT | Performed by: STUDENT IN AN ORGANIZED HEALTH CARE EDUCATION/TRAINING PROGRAM

## 2023-11-16 PROCEDURE — 1036F TOBACCO NON-USER: CPT | Performed by: STUDENT IN AN ORGANIZED HEALTH CARE EDUCATION/TRAINING PROGRAM

## 2023-11-16 PROCEDURE — 3017F COLORECTAL CA SCREEN DOC REV: CPT | Performed by: STUDENT IN AN ORGANIZED HEALTH CARE EDUCATION/TRAINING PROGRAM

## 2023-11-16 PROCEDURE — 3044F HG A1C LEVEL LT 7.0%: CPT | Performed by: STUDENT IN AN ORGANIZED HEALTH CARE EDUCATION/TRAINING PROGRAM

## 2023-11-16 PROCEDURE — G8419 CALC BMI OUT NRM PARAM NOF/U: HCPCS | Performed by: STUDENT IN AN ORGANIZED HEALTH CARE EDUCATION/TRAINING PROGRAM

## 2023-11-16 PROCEDURE — 3075F SYST BP GE 130 - 139MM HG: CPT | Performed by: STUDENT IN AN ORGANIZED HEALTH CARE EDUCATION/TRAINING PROGRAM

## 2023-11-16 PROCEDURE — G8484 FLU IMMUNIZE NO ADMIN: HCPCS | Performed by: STUDENT IN AN ORGANIZED HEALTH CARE EDUCATION/TRAINING PROGRAM

## 2023-11-16 RX ORDER — FERROUS SULFATE 325(65) MG
325 TABLET ORAL
COMMUNITY

## 2023-11-16 RX ORDER — ASCORBIC ACID 500 MG
500 TABLET ORAL DAILY
COMMUNITY

## 2023-11-16 RX ORDER — TRAMADOL HYDROCHLORIDE 50 MG/1
50 TABLET ORAL EVERY 8 HOURS PRN
Qty: 180 TABLET | Refills: 0 | Status: SHIPPED | OUTPATIENT
Start: 2023-11-16 | End: 2024-02-14

## 2023-11-16 RX ORDER — TRAMADOL HYDROCHLORIDE 50 MG/1
50 TABLET ORAL EVERY 6 HOURS PRN
COMMUNITY
End: 2023-11-16 | Stop reason: SDUPTHER

## 2023-11-16 RX ORDER — RAMELTEON 8 MG/1
8 TABLET ORAL NIGHTLY PRN
Qty: 365 TABLET | Refills: 0 | Status: SHIPPED | OUTPATIENT
Start: 2023-11-16 | End: 2024-11-15

## 2023-11-16 ASSESSMENT — ENCOUNTER SYMPTOMS
GASTROINTESTINAL NEGATIVE: 1
BACK PAIN: 1
RESPIRATORY NEGATIVE: 1

## 2023-11-16 NOTE — PROGRESS NOTES
Roma Donato (:  1960) is a 58 y.o. male,Established patient, here for evaluation of the following chief complaint(s):  3 Month Follow-Up         ASSESSMENT/PLAN:  1. 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 2 diabetes mellitus, with necrosis of bone (HCC)  -     empagliflozin (JARDIANCE) 10 MG tablet; Take 1 tablet by mouth daily, Disp-90 tablet, R-1Normal  2. Cerebrovascular accident (CVA), unspecified mechanism ([address])  3. Primary hypertension  4. Coronary artery disease involving native coronary artery of native heart without angina pectoris  -     empagliflozin (JARDIANCE) 10 MG tablet; Take 1 tablet by mouth daily, Disp-90 tablet, R-1Normal  5. Primary insomnia  -     ramelteon (ROZEREM) 8 MG tablet; Take 1 tablet by mouth nightly as needed for Sleep, Disp-365 tablet, R-0Normal  6. Chronic bilateral low back pain without sciatica  -     traMADol (ULTRAM) 50 MG tablet; Take 1 tablet by mouth every 8 hours as needed for Pain for up to 90 days. Max Daily Amount: 150 mg, Disp-180 tablet, R-0Normal      Return in about 4 weeks (around 2023). PRMP reviewed and appropriate    Encouraged patient to stop all alcohol use    Will start jardiance for improved improve glycemic control and cardiac protection; side effects discussed and advised patient to call if he experiences any issues    He again declines colon screening; discussed importance of this given is anemia but he declines    Subjective   SUBJECTIVE/OBJECTIVE:  HPI    Patient is a 57 y/o M with a PMHx of T2DM, HTN, CAD,CVA, PAD who presents for follow up.      He sustained an NSTEMI in September and cardiac cath revealed L main and RCA disease; he had an appointment with cardiothoracic surgery at [facility] on 11/3 who recommended CABG and patient is needing to undergo testing for this; he was started on ASA, Coreg 3.125 BID and atorvastatin 80mg; he denies any shortness of breath, chest pain, LE edema     T2DM- a1c  was 5.5 but is up to

## 2023-11-23 DIAGNOSIS — E11.649 TYPE 2 DIABETES MELLITUS WITH HYPOGLYCEMIA WITHOUT COMA, WITHOUT LONG-TERM CURRENT USE OF INSULIN (MULTI): ICD-10-CM

## 2023-11-25 RX ORDER — FOLIC ACID 1 MG/1
1 TABLET ORAL DAILY
Qty: 20 TABLET | Refills: 0 | Status: SHIPPED | OUTPATIENT
Start: 2023-11-25 | End: 2023-12-15

## 2023-12-07 DIAGNOSIS — M54.50 CHRONIC LOW BACK PAIN WITHOUT SCIATICA, UNSPECIFIED BACK PAIN LATERALITY: ICD-10-CM

## 2023-12-07 DIAGNOSIS — G89.29 CHRONIC LOW BACK PAIN WITHOUT SCIATICA, UNSPECIFIED BACK PAIN LATERALITY: ICD-10-CM

## 2023-12-07 RX ORDER — GABAPENTIN 100 MG/1
CAPSULE ORAL
Qty: 180 CAPSULE | Refills: 0 | Status: SHIPPED | OUTPATIENT
Start: 2023-12-07 | End: 2024-01-06

## 2023-12-14 DIAGNOSIS — E11.649 TYPE 2 DIABETES MELLITUS WITH HYPOGLYCEMIA WITHOUT COMA, WITHOUT LONG-TERM CURRENT USE OF INSULIN (MULTI): ICD-10-CM

## 2023-12-14 RX ORDER — GLIMEPIRIDE 2 MG/1
2 TABLET ORAL
COMMUNITY
Start: 2023-12-06

## 2023-12-15 ENCOUNTER — OFFICE VISIT (OUTPATIENT)
Dept: PRIMARY CARE CLINIC | Age: 63
End: 2023-12-15
Payer: COMMERCIAL

## 2023-12-15 VITALS
HEIGHT: 71 IN | DIASTOLIC BLOOD PRESSURE: 84 MMHG | BODY MASS INDEX: 25.1 KG/M2 | SYSTOLIC BLOOD PRESSURE: 130 MMHG | OXYGEN SATURATION: 100 % | TEMPERATURE: 97.9 F | HEART RATE: 67 BPM

## 2023-12-15 DIAGNOSIS — D50.9 IRON DEFICIENCY ANEMIA, UNSPECIFIED IRON DEFICIENCY ANEMIA TYPE: ICD-10-CM

## 2023-12-15 DIAGNOSIS — I63.9 CEREBROVASCULAR ACCIDENT (CVA), UNSPECIFIED MECHANISM (HCC): ICD-10-CM

## 2023-12-15 DIAGNOSIS — E11.621 TYPE 2 DIABETES MELLITUS WITH LEFT DIABETIC FOOT ULCER (HCC): Primary | ICD-10-CM

## 2023-12-15 DIAGNOSIS — I25.10 CORONARY ARTERY DISEASE INVOLVING NATIVE CORONARY ARTERY OF NATIVE HEART WITHOUT ANGINA PECTORIS: ICD-10-CM

## 2023-12-15 DIAGNOSIS — G89.29 CHRONIC MIDLINE LOW BACK PAIN WITHOUT SCIATICA: ICD-10-CM

## 2023-12-15 DIAGNOSIS — E11.9 TYPE 2 DIABETES MELLITUS WITHOUT COMPLICATION, WITHOUT LONG-TERM CURRENT USE OF INSULIN (HCC): ICD-10-CM

## 2023-12-15 DIAGNOSIS — M54.50 CHRONIC MIDLINE LOW BACK PAIN WITHOUT SCIATICA: ICD-10-CM

## 2023-12-15 DIAGNOSIS — L97.529 TYPE 2 DIABETES MELLITUS WITH LEFT DIABETIC FOOT ULCER (HCC): Primary | ICD-10-CM

## 2023-12-15 PROCEDURE — 3079F DIAST BP 80-89 MM HG: CPT | Performed by: STUDENT IN AN ORGANIZED HEALTH CARE EDUCATION/TRAINING PROGRAM

## 2023-12-15 PROCEDURE — 99214 OFFICE O/P EST MOD 30 MIN: CPT | Performed by: STUDENT IN AN ORGANIZED HEALTH CARE EDUCATION/TRAINING PROGRAM

## 2023-12-15 PROCEDURE — G8484 FLU IMMUNIZE NO ADMIN: HCPCS | Performed by: STUDENT IN AN ORGANIZED HEALTH CARE EDUCATION/TRAINING PROGRAM

## 2023-12-15 PROCEDURE — 3017F COLORECTAL CA SCREEN DOC REV: CPT | Performed by: STUDENT IN AN ORGANIZED HEALTH CARE EDUCATION/TRAINING PROGRAM

## 2023-12-15 PROCEDURE — 3044F HG A1C LEVEL LT 7.0%: CPT | Performed by: STUDENT IN AN ORGANIZED HEALTH CARE EDUCATION/TRAINING PROGRAM

## 2023-12-15 PROCEDURE — G8419 CALC BMI OUT NRM PARAM NOF/U: HCPCS | Performed by: STUDENT IN AN ORGANIZED HEALTH CARE EDUCATION/TRAINING PROGRAM

## 2023-12-15 PROCEDURE — 1036F TOBACCO NON-USER: CPT | Performed by: STUDENT IN AN ORGANIZED HEALTH CARE EDUCATION/TRAINING PROGRAM

## 2023-12-15 PROCEDURE — G8427 DOCREV CUR MEDS BY ELIG CLIN: HCPCS | Performed by: STUDENT IN AN ORGANIZED HEALTH CARE EDUCATION/TRAINING PROGRAM

## 2023-12-15 PROCEDURE — 2022F DILAT RTA XM EVC RTNOPTHY: CPT | Performed by: STUDENT IN AN ORGANIZED HEALTH CARE EDUCATION/TRAINING PROGRAM

## 2023-12-15 PROCEDURE — 3075F SYST BP GE 130 - 139MM HG: CPT | Performed by: STUDENT IN AN ORGANIZED HEALTH CARE EDUCATION/TRAINING PROGRAM

## 2023-12-15 RX ORDER — FOLIC ACID 1 MG/1
1 TABLET ORAL DAILY
Qty: 20 TABLET | Refills: 0 | Status: SHIPPED | OUTPATIENT
Start: 2023-12-15

## 2023-12-15 RX ORDER — CLOPIDOGREL BISULFATE 75 MG/1
75 TABLET ORAL DAILY
Qty: 90 TABLET | Refills: 1 | Status: SHIPPED | OUTPATIENT
Start: 2023-12-15 | End: 2024-12-09

## 2023-12-15 RX ORDER — CYCLOBENZAPRINE HCL 5 MG
TABLET ORAL
Qty: 90 TABLET | Refills: 1 | Status: SHIPPED | OUTPATIENT
Start: 2023-12-15

## 2023-12-15 NOTE — PROGRESS NOTES
Neurological:  Positive for weakness (chronic left sided weakness from prior CVA). Psychiatric/Behavioral: Negative. Objective   Physical Exam  Vitals reviewed. Constitutional:       General: He is not in acute distress. HENT:      Head: Normocephalic and atraumatic. Eyes:      General:         Right eye: No discharge. Left eye: No discharge. Cardiovascular:      Rate and Rhythm: Normal rate and regular rhythm. Pulses: Normal pulses. Heart sounds: Normal heart sounds. Pulmonary:      Effort: Pulmonary effort is normal.      Breath sounds: Normal breath sounds. Abdominal:      General: Bowel sounds are normal.      Palpations: Abdomen is soft. Skin:     General: Skin is warm and dry. Neurological:      Mental Status: He is alert. Mental status is at baseline. Psychiatric:         Mood and Affect: Mood normal.         Behavior: Behavior normal.              An electronic signature was used to authenticate this note.     --Birgit Bond MD

## 2023-12-27 ENCOUNTER — HOSPITAL ENCOUNTER (OUTPATIENT)
Dept: VASCULAR MEDICINE | Facility: HOSPITAL | Age: 63
Discharge: HOME | End: 2023-12-27
Payer: COMMERCIAL

## 2023-12-27 DIAGNOSIS — I65.23 OCCLUSION AND STENOSIS OF BILATERAL CAROTID ARTERIES: ICD-10-CM

## 2023-12-27 DIAGNOSIS — Z01.818 ENCOUNTER FOR OTHER PREPROCEDURAL EXAMINATION: ICD-10-CM

## 2023-12-27 DIAGNOSIS — I65.9 OCCLUSION AND STENOSIS OF UNSPECIFIED PRECEREBRAL ARTERY: ICD-10-CM

## 2023-12-27 DIAGNOSIS — I63.9 CEREBROVASCULAR ACCIDENT (CVA), UNSPECIFIED MECHANISM (MULTI): ICD-10-CM

## 2023-12-27 DIAGNOSIS — I25.10 CORONARY ARTERY DISEASE INVOLVING NATIVE CORONARY ARTERY OF NATIVE HEART WITHOUT ANGINA PECTORIS: ICD-10-CM

## 2023-12-27 DIAGNOSIS — I70.244 ATHEROSCLEROSIS OF NATIVE ARTERY OF LEFT LOWER EXTREMITY WITH ULCERATION OF HEEL (MULTI): ICD-10-CM

## 2023-12-27 DIAGNOSIS — Z01.818 PRE-OP EVALUATION: ICD-10-CM

## 2023-12-27 PROCEDURE — 93880 EXTRACRANIAL BILAT STUDY: CPT | Performed by: SURGERY

## 2023-12-27 PROCEDURE — 93880 EXTRACRANIAL BILAT STUDY: CPT

## 2023-12-27 PROCEDURE — 93970 EXTREMITY STUDY: CPT | Performed by: SURGERY

## 2023-12-27 PROCEDURE — 93970 EXTREMITY STUDY: CPT

## 2024-01-02 RX ORDER — GLIMEPIRIDE 2 MG/1
2 TABLET ORAL
Qty: 30 TABLET | Refills: 0 | Status: SHIPPED | OUTPATIENT
Start: 2024-01-02

## 2024-01-04 DIAGNOSIS — E11.649 TYPE 2 DIABETES MELLITUS WITH HYPOGLYCEMIA WITHOUT COMA, WITHOUT LONG-TERM CURRENT USE OF INSULIN (MULTI): ICD-10-CM

## 2024-01-05 ENCOUNTER — OFFICE VISIT (OUTPATIENT)
Dept: PRIMARY CARE CLINIC | Age: 64
End: 2024-01-05
Payer: MEDICARE

## 2024-01-05 VITALS
HEART RATE: 62 BPM | OXYGEN SATURATION: 99 % | WEIGHT: 196 LBS | BODY MASS INDEX: 27.44 KG/M2 | SYSTOLIC BLOOD PRESSURE: 169 MMHG | HEIGHT: 71 IN | DIASTOLIC BLOOD PRESSURE: 79 MMHG | TEMPERATURE: 97.9 F

## 2024-01-05 DIAGNOSIS — D64.9 ANEMIA, UNSPECIFIED TYPE: ICD-10-CM

## 2024-01-05 DIAGNOSIS — I10 PRIMARY HYPERTENSION: Primary | ICD-10-CM

## 2024-01-05 DIAGNOSIS — Z76.89 ESTABLISHING CARE WITH NEW DOCTOR, ENCOUNTER FOR: ICD-10-CM

## 2024-01-05 PROBLEM — G89.29 CHRONIC BILATERAL LOW BACK PAIN WITHOUT SCIATICA: Status: ACTIVE | Noted: 2024-01-05

## 2024-01-05 PROBLEM — M54.50 CHRONIC BILATERAL LOW BACK PAIN WITHOUT SCIATICA: Status: ACTIVE | Noted: 2024-01-05

## 2024-01-05 PROCEDURE — 1036F TOBACCO NON-USER: CPT | Performed by: FAMILY MEDICINE

## 2024-01-05 PROCEDURE — G8484 FLU IMMUNIZE NO ADMIN: HCPCS | Performed by: FAMILY MEDICINE

## 2024-01-05 PROCEDURE — 3078F DIAST BP <80 MM HG: CPT | Performed by: FAMILY MEDICINE

## 2024-01-05 PROCEDURE — 3017F COLORECTAL CA SCREEN DOC REV: CPT | Performed by: FAMILY MEDICINE

## 2024-01-05 PROCEDURE — G8427 DOCREV CUR MEDS BY ELIG CLIN: HCPCS | Performed by: FAMILY MEDICINE

## 2024-01-05 PROCEDURE — 99213 OFFICE O/P EST LOW 20 MIN: CPT | Performed by: FAMILY MEDICINE

## 2024-01-05 PROCEDURE — 3077F SYST BP >= 140 MM HG: CPT | Performed by: FAMILY MEDICINE

## 2024-01-05 PROCEDURE — G8419 CALC BMI OUT NRM PARAM NOF/U: HCPCS | Performed by: FAMILY MEDICINE

## 2024-01-05 ASSESSMENT — PATIENT HEALTH QUESTIONNAIRE - PHQ9
SUM OF ALL RESPONSES TO PHQ QUESTIONS 1-9: 0
1. LITTLE INTEREST OR PLEASURE IN DOING THINGS: 0
2. FEELING DOWN, DEPRESSED OR HOPELESS: 0
SUM OF ALL RESPONSES TO PHQ QUESTIONS 1-9: 0
SUM OF ALL RESPONSES TO PHQ9 QUESTIONS 1 & 2: 0
SUM OF ALL RESPONSES TO PHQ QUESTIONS 1-9: 0
SUM OF ALL RESPONSES TO PHQ QUESTIONS 1-9: 0

## 2024-01-05 NOTE — PROGRESS NOTES
Exam   Vitals: BP (!) 169/79   Pulse 62   Temp 97.9 °F (36.6 °C) (Temporal)   Ht 1.803 m (5' 11\")   Wt 88.9 kg (196 lb)   SpO2 99%   BMI 27.34 kg/m²   Physical Exam  Vitals and nursing note reviewed.   Constitutional:       General: He is not in acute distress.     Appearance: Normal appearance.   HENT:      Head: Normocephalic and atraumatic.   Eyes:      General:         Right eye: No discharge.         Left eye: No discharge.   Cardiovascular:      Rate and Rhythm: Normal rate and regular rhythm.      Heart sounds: No murmur heard.  Pulmonary:      Effort: Pulmonary effort is normal.      Breath sounds: Normal breath sounds. No wheezing.   Abdominal:      General: Bowel sounds are normal.      Palpations: Abdomen is soft.   Musculoskeletal:      Cervical back: No rigidity.      Right lower leg: No edema.      Left lower leg: No edema.   Skin:     General: Skin is warm and dry.   Neurological:      Mental Status: He is alert and oriented to person, place, and time. Mental status is at baseline.      Comments: Hemiparesis of left side, in wheelchair throughout exam         Assessment and Plan     1. Primary hypertension  Uncontrolled here but controlled at home  Nervous today  Continue Coreg 3.125mg BID, to call for refills when needed  FU 1 month    2. Anemia, unspecified type  On recent labs  Due for recheck  Check labs  FU 1 month  - CBC; Future  - Iron and TIBC; Future  - Ferritin; Future  - Basic Metabolic Panel; Future    3. Establishing care with new doctor, encounter for  Updated medical, surgical, family, and social histories and medication list as appropriate.         Counseled regarding above diagnosis, including possible risks and complications, especially if left uncontrolled. Counseled regarding the possible side effects, risks, benefits and alternatives to treatment; patient and/or guardian verbalizes understanding, agrees, feels comfortable with, and wishes to proceed with above treatment

## 2024-01-06 RX ORDER — FOLIC ACID 1 MG/1
1 TABLET ORAL DAILY
Qty: 20 TABLET | Refills: 0 | OUTPATIENT
Start: 2024-01-06

## 2024-01-09 PROBLEM — D64.9 ANEMIA: Status: ACTIVE | Noted: 2024-01-09

## 2024-01-09 PROBLEM — I65.23 BILATERAL CAROTID ARTERY STENOSIS: Status: ACTIVE | Noted: 2024-01-09

## 2024-01-09 NOTE — PROGRESS NOTES
[facility]    Patient Name: Santana Mayo  Patient : 1960      Scribe Attestation  By signing my name below, [employer] Scribe   attest that this documentation has been prepared under the direction and in the presence of Umesh Newby DO.      Reason for visit:  This is a 63-year-old male here for follow-up regarding coronary artery disease/NSTEMI in 2023, hypertension, dyslipidemia, diabetes mellitus, anemia, aortic valve stenosis, and carotid artery stenosis.    HPI:  This is a 63-year-old male here for follow-up regarding coronary artery disease/NSTEMI in 2023, hypertension, dyslipidemia, diabetes mellitus, anemia, aortic valve stenosis, and carotid artery stenosis.  Patient was last evaluated by me in 2023.  At that visit I referred him to CT surgery for possible bypass, ordered blood work including CMP/lipid/magnesium/CBC to be drawn in 3 months, and asked the patient to follow-up in 3 months and sooner if necessary.  Patient was subsequently seen by CT surgery in 2023 where a bypass surgery was offered but patient declined at that time stating he needed more time to consider options. Carotid ultrasound done in 2023 showed findings consistent with 50 to 69% stenosis of the right proximal internal carotid artery and less than 50% stenosis of the left proximal internal carotid artery. ECG done today showed sinus rhythm with a heart rate of 62 bpm.  The patient reports that he has been feeling generally well from the cardiac standpoint. He denies any new chest pain, shortness of breath, palpitations and lightheadedness. He states that he was seen by CT surgery and will undergo a series of exams prior to his bypass surgery, although it has not been scheduled yet. He states that he has elevated blood pressure readings at home. He states that he takes all of his medications as prescribed. During my exam, he was  resting comfortably on the exam table.              Assessment/Plan:   CAD/NSTEMI  The has a history of coronary artery disease/NSTEMI in October 2023.   And Left heart catheterization done 10/2/2023 showed  of the RCA with severe disease of the left main.   He denies any new chest pain, shortness of breath, palpitations and lightheadedness. He states that he was seen by CT surgery and plans to undergo bypass surgery although the surgery has not yet been scheduled.  ECG done today showed sinus rhythm with a heart rate of 62 bpm.    He denies anginal chest discomfort.  Blood pressure appears suboptimally controlled on exam today.  I will increase losartan to 100 mg daily.   He should continue current antihypertensive medications and antiplatelet therapy.  Echocardiogram done 10/3/2023 showed an ejection fraction of 50%.   Lipid panel done in January 2023 showed an LDL of 96 and triglycerides of 121.   He is currently on atorvastatin 80 mg daily.  Lab works were ordered as noted below.   Please see lifestyle recommendations below.  Follow up in 6 months and sooner if necessary.      2. Hypertension  The patient has a history of hypertension which appears suboptimally controlled on exam today.  He states that he has elevated blood pressure readings at home.  I will increase losartan to 100 mg daily.   He should continue his other antihypertensive medications and monitor his blood pressure at home.  He should call the clinic if his blood pressure is persistently elevated.     3. Dyslipidemia  Lipid panel done in January 2023 showed an LDL of 96 and triglycerides of 121.   He is currently on atorvastatin 80 mg daily.  Lipid panel was ordered as noted below.  Please see lifestyle recommendations below.     4. Diabetes mellitus  Stable. Medication management as per PCP.     5. Anemia  CBC done in October 2023 showed a hemoglobin of 9.0.  Management as per PCP.     6. Aortic valve stenosis  The patient had a history of  mild aortic valve stenosis seen on echocardiogram done in January 2023.  I will continue to monitor this clinically for now.     7. Carotid artery stenosis  Carotid ultrasound done in December 2023 showed findings consistent with 50 to 69% stenosis of the right proximal internal carotid artery and less than 50% stenosis of the left proximal internal carotid artery.   Continue risk factor modification.     Orders:   Increase losartan to 100 mg daily.   CMP/lipid/magnesium/CBC   Follow-up in 6 months.    Lifestyle Recommendations  I recommend a whole-food plant-based diet, an eating pattern that encourages the consumption of unrefined plant foods (such as fruits, vegetables, tubers, whole grains, legumes, nuts and seeds) and discourages meats, dairy products, eggs and processed foods.     The AHA/ACC recommends that the patient consume a dietary pattern that emphasizes intake of vegetables, fruits, and whole grains; includes low-fat dairy products, poultry, fish, legumes, non-tropical vegetable oils, and nuts; and limits intake of sodium, sweets, sugar-sweetened beverages, and red meats.  Adapt this dietary pattern to appropriate calorie requirements (a 500-750 kcal/day deficit to loose weight), personal and cultural food preferences, and nutrition therapy for other medical conditions (including diabetes).  Achieve this pattern by following plans such as the [place] Mediterranean, DASH dietary pattern, or AHA diet.     Engage in 2 hours and 30 minutes per week of moderate-intensity physical activity, or 1 hour and 15 minutes (75 minutes) per week of vigorous-intensity aerobic physical activity, or an equivalent combination of moderate and vigorous-intensity aerobic physical activity. Aerobic activity should be performed in episodes of at least 10 minutes preferably spread throughout the week.     Adhering to a heart healthy diet, regular exercise habits, avoidance of tobacco products, and maintenance of a healthy weight  are crucial components of their heart disease risk reduction.     Any positive review of systems not specifically addressed in the office visit today should be evaluated and treated by the patients primary care physician or in an emergency department if necessary     Patient was notified that results from ordered tests will be called to the patient if it changes current management; it will otherwise be discussed at a future appointment and available on  [facility].     Thank you for allowing me to participate in the care of this patient.        This document was generated using the assistance of voice recognition software. If there are any errors of spelling, grammar, syntax, or meaning; please feel free to contact me directly for clarification.    Past Medical History:  He has a past medical history of CVA (cerebral vascular accident) (CMS/[facility]), DM (diabetes mellitus) (CMS/[facility]), HLD (hyperlipidemia), and HTN (hypertension).    Past Surgical History:  He has a past surgical history that includes CT angio abdomen pelvis w and or wo IV IV contrast (1/30/2023) and Cardiac catheterization (N/A, 10/2/2023).      Social History:  He reports that he has quit smoking. His smoking use included cigarettes. He has never used smokeless tobacco. He reports current alcohol use of about 6.0 standard drinks of alcohol per week. He reports that he does not use drugs.    Family History:  No family history on file.     Allergies:  Penicillins and Penicillin v    Outpatient Medications:  Current Outpatient Medications   Medication Instructions    acetaminophen (Tylenol) 325 mg tablet 2 tablets, oral, Every 4 hours PRN    aspirin 81 mg, oral, Daily    atorvastatin (LIPITOR) 80 mg, oral, Daily    B complex-C-E-FA-Zn-lysine tablet 1 tablet, oral, Daily    carvedilol (COREG) 3.125 mg, oral, 2 times daily with meals    clopidogrel (PLAVIX) 75 mg, oral, Daily    collagenase (SantyL) 250 unit/gram ointment     cyanocobalamin (VITAMIN B-12)  "1,000 mcg, oral, Daily    cyclobenzaprine (FLEXERIL) 5 mg, oral, Nightly    ergocalciferol (VITAMIN D-2) 50,000 Units, oral, Weekly    ferrous sulfate 325 (65 Fe) MG tablet 65 mg of iron, oral, Daily with breakfast    folic acid (FOLVITE) 1 mg, oral, Daily    gabapentin (NEURONTIN) 100 mg, oral, 3 times daily    melatonin 9 mg, oral, Nightly    metFORMIN (GLUCOPHAGE) 500 mg, oral, 2 times daily with meals    pantoprazole (PROTONIX) 40 mg, oral, Daily before breakfast    Vitamin C 500 mg, oral, Daily        ROS:  A 14 point review of systems was done and is negative other than as stated in HPI    Vitals:      10/3/2023     7:35 PM 10/3/2023    11:22 PM 10/4/2023     2:49 AM 10/4/2023     6:00 AM 10/4/2023     8:40 AM 10/10/2023    11:34 AM 11/3/2023     1:19 PM   Vitals   Systolic 149 133 135  127 100 186   Diastolic 80 74 78  60 78 74   Heart Rate 81 64 66  68 63 75   Temp 36.7 °C (98 °F) 36.3 °C (97.3 °F) 36.6 °C (97.9 °F)  36.4 °C (97.5 °F)     Resp 16 16 16  16     Height (in)      1.803 m (5' 11\")    Weight (lb)    196.65  203 201   BMI    27.43 kg/m2  28.31 kg/m2 28.03 kg/m2   BSA (m2)    2.11 m2  2.15 m2 2.14 m2        Physical Exam:     Constitutional: Cooperative, in no acute distress, alert, appears stated age.  Skin: Skin color, texture, turgor normal. No rashes or lesions.  Head: Normocephalic. No masses, lesions, tenderness or abnormalities  Eyes: Extraocular movements are grossly intact.  Mouth and throat: Mucous membranes moist  Neck: Neck supple, no carotid bruits, no JVD  Respiratory: Lungs clear to auscultation, no wheezing or rhonchi, no use of accessory muscles  Chest wall: No scars, normal excursion with respiration  Cardiovascular: Regular rhythm, +murmur  Gastrointestinal: Abdomen soft, nontender. Bowel sounds normal.  Musculoskeletal: Strength equal in upper extremities  Extremities: Trace pitting edema  Neurologic: Sensation grossly intact, alert and oriented ×3    Intake/Output:   No " intake/output data recorded.    Outpatient Medications  Current Outpatient Medications on File Prior to Visit   Medication Sig Dispense Refill    acetaminophen (Tylenol) 325 mg tablet Take 2 tablets (650 mg) by mouth every 4 hours if needed.      aspirin 81 mg chewable tablet Chew 1 tablet (81 mg) once daily. 30 tablet 3    atorvastatin (Lipitor) 80 mg tablet Take 1 tablet (80 mg) by mouth once daily. 90 tablet 1    B complex-C-E-FA-Zn-lysine tablet Take 1 tablet by mouth once daily.      carvedilol (Coreg) 3.125 mg tablet Take 1 tablet (3.125 mg) by mouth 2 times a day with meals. 120 tablet 1    clopidogrel (Plavix) 75 mg tablet Take 1 tablet (75 mg) by mouth once daily.      collagenase (SantyL) 250 unit/gram ointment       cyanocobalamin (Vitamin B-12) 1,000 mcg tablet Take 1 tablet (1,000 mcg) by mouth once daily. 30 tablet 90    cyclobenzaprine (Flexeril) 5 mg tablet Take 1 tablet (5 mg) by mouth once daily at bedtime.      ergocalciferol (Vitamin D-2) 1.25 MG (52032 UT) capsule Take 1 capsule (50,000 Units) by mouth 1 (one) time per week.      ferrous sulfate 325 (65 Fe) MG tablet Take 1 tablet (65 mg of iron) by mouth once daily with a meal. 90 tablet 1    folic acid (Folvite) 1 mg tablet take 1 tablet by mouth once daily 20 tablet 0    gabapentin (Neurontin) 100 mg capsule Take 1 capsule (100 mg) by mouth 3 times a day.      melatonin 3 mg tablet Take 3 tablets (9 mg) by mouth once daily at bedtime.      metFORMIN (Glucophage) 500 mg tablet Take 1 tablet (500 mg) by mouth 2 times a day with meals.      pantoprazole (ProtoNix) 40 mg EC tablet Take 1 tablet (40 mg) by mouth once daily in the morning. Take before meals.      Vitamin C 500 mg tablet Take 1 tablet (500 mg) by mouth once daily.       No current facility-administered medications on file prior to visit.       Labs: (past 26 weeks)  Recent Results (from the past 4368 hour(s))   POCT glucose    Collection Time: 09/30/23  8:15 AM   Result Value Ref  Range    POC Fingerstick Blood Glucose 44 (A) 70 - 100 mg/dl   POCT GLUCOSE    Collection Time: 09/30/23  8:16 AM   Result Value Ref Range    POCT Glucose 44 (L) 74 - 99 mg/dL   POCT GLUCOSE    Collection Time: 09/30/23  8:39 AM   Result Value Ref Range    POCT Glucose 156 (H) 74 - 99 mg/dL   POCT glucose    Collection Time: 09/30/23  8:40 AM   Result Value Ref Range    POC Fingerstick Blood Glucose 156 (A) 70 - 100 mg/dl   CBC and Auto Differential    Collection Time: 09/30/23  8:45 AM   Result Value Ref Range    WBC 7.0 4.4 - 11.3 x10*3/uL    nRBC 0.0 0.0 - 0.0 /100 WBCs    RBC 3.47 (L) 4.50 - 5.90 x10*6/uL    Hemoglobin 9.7 (L) 13.5 - 17.5 g/dL    Hematocrit 29.9 (L) 41.0 - 52.0 %    MCV 86 80 - 100 fL    MCH 28.0 26.0 - 34.0 pg    MCHC 32.4 32.0 - 36.0 g/dL    RDW 17.3 (H) 11.5 - 14.5 %    Platelets 324 150 - 450 x10*3/uL    MPV 9.0 7.5 - 11.5 fL    Neutrophils % 75.4 40.0 - 80.0 %    Immature Granulocytes %, Automated 0.4 0.0 - 0.9 %    Lymphocytes % 14.0 13.0 - 44.0 %    Monocytes % 6.9 2.0 - 10.0 %    Eosinophils % 2.4 0.0 - 6.0 %    Basophils % 0.9 0.0 - 2.0 %    Neutrophils Absolute 5.26 1.20 - 7.70 x10*3/uL    Immature Granulocytes Absolute, Automated 0.03 0.00 - 0.70 x10*3/uL    Lymphocytes Absolute 0.98 (L) 1.20 - 4.80 x10*3/uL    Monocytes Absolute 0.48 0.10 - 1.00 x10*3/uL    Eosinophils Absolute 0.17 0.00 - 0.70 x10*3/uL    Basophils Absolute 0.06 0.00 - 0.10 x10*3/uL   Comprehensive metabolic panel    Collection Time: 09/30/23  8:45 AM   Result Value Ref Range    Glucose 221 (H) 74 - 99 mg/dL    Sodium 132 (L) 136 - 145 mmol/L    Potassium 5.4 (H) 3.5 - 5.3 mmol/L    Chloride 99 98 - 107 mmol/L    Bicarbonate 22 21 - 32 mmol/L    Anion Gap 16 10 - 20 mmol/L    Urea Nitrogen 10 6 - 23 mg/dL    Creatinine 0.89 0.50 - 1.30 mg/dL    eGFR >90 >60 mL/min/1.73m*2    Calcium 9.4 8.6 - 10.3 mg/dL    Albumin 4.4 3.4 - 5.0 g/dL    Alkaline Phosphatase 86 33 - 136 U/L    Total Protein 7.9 6.4 - 8.2 g/dL    AST  32 9 - 39 U/L    Bilirubin, Total 1.0 0.0 - 1.2 mg/dL    ALT 19 10 - 52 U/L   Troponin I, High Sensitivity    Collection Time: 09/30/23  8:45 AM   Result Value Ref Range    Troponin I, High Sensitivity 135 (HH) 0 - 20 ng/L   Protime-INR    Collection Time: 09/30/23  8:45 AM   Result Value Ref Range    Protime 11.8 9.8 - 12.8 seconds    INR 1.0 0.9 - 1.1   APTT    Collection Time: 09/30/23  8:45 AM   Result Value Ref Range    aPTT 28 27 - 38 seconds   POCT glucose    Collection Time: 09/30/23  9:17 AM   Result Value Ref Range    POC Fingerstick Blood Glucose 152 (A) 70 - 100 mg/dl   POCT GLUCOSE    Collection Time: 09/30/23  9:18 AM   Result Value Ref Range    POCT Glucose 152 (H) 74 - 99 mg/dL   Troponin I, High Sensitivity    Collection Time: 09/30/23 12:35 PM   Result Value Ref Range    Troponin I, High Sensitivity 114 (HH) 0 - 20 ng/L   POCT GLUCOSE    Collection Time: 09/30/23  1:00 PM   Result Value Ref Range    POCT Glucose 104 (H) 74 - 99 mg/dL   Troponin I, High Sensitivity    Collection Time: 09/30/23  3:54 PM   Result Value Ref Range    Troponin I, High Sensitivity 246 (HH) 0 - 20 ng/L   Hemoglobin A1c    Collection Time: 09/30/23  3:54 PM   Result Value Ref Range    Hemoglobin A1C 5.6 see below %    Estimated Average Glucose 114 Not Established mg/dL   POCT GLUCOSE    Collection Time: 09/30/23  5:00 PM   Result Value Ref Range    POCT Glucose 74 74 - 99 mg/dL   POCT GLUCOSE    Collection Time: 09/30/23  9:51 PM   Result Value Ref Range    POCT Glucose 231 (H) 74 - 99 mg/dL   POCT GLUCOSE    Collection Time: 09/30/23 10:44 PM   Result Value Ref Range    POCT Glucose 228 (H) 74 - 99 mg/dL   POCT GLUCOSE    Collection Time: 09/30/23 11:52 PM   Result Value Ref Range    POCT Glucose 240 (H) 74 - 99 mg/dL   Comprehensive metabolic panel    Collection Time: 10/01/23  4:48 AM   Result Value Ref Range    Glucose 176 (H) 74 - 99 mg/dL    Sodium 134 (L) 136 - 145 mmol/L    Potassium 4.1 3.5 - 5.3 mmol/L    Chloride  102 98 - 107 mmol/L    Bicarbonate 25 21 - 32 mmol/L    Anion Gap 11 10 - 20 mmol/L    Urea Nitrogen 17 6 - 23 mg/dL    Creatinine 1.08 0.50 - 1.30 mg/dL    eGFR 77 >60 mL/min/1.73m*2    Calcium 8.8 8.6 - 10.3 mg/dL    Albumin 3.6 3.4 - 5.0 g/dL    Alkaline Phosphatase 71 33 - 136 U/L    Total Protein 6.5 6.4 - 8.2 g/dL    AST 22 9 - 39 U/L    Bilirubin, Total 0.8 0.0 - 1.2 mg/dL    ALT 14 10 - 52 U/L   CBC and Auto Differential    Collection Time: 10/01/23  4:48 AM   Result Value Ref Range    WBC 7.3 4.4 - 11.3 x10*3/uL    nRBC 0.0 0.0 - 0.0 /100 WBCs    RBC 3.00 (L) 4.50 - 5.90 x10*6/uL    Hemoglobin 8.1 (L) 13.5 - 17.5 g/dL    Hematocrit 25.7 (L) 41.0 - 52.0 %    MCV 86 80 - 100 fL    MCH 27.0 26.0 - 34.0 pg    MCHC 31.5 (L) 32.0 - 36.0 g/dL    RDW 17.4 (H) 11.5 - 14.5 %    Platelets 295 150 - 450 x10*3/uL    MPV 9.5 7.5 - 11.5 fL    Neutrophils % 67.8 40.0 - 80.0 %    Immature Granulocytes %, Automated 0.4 0.0 - 0.9 %    Lymphocytes % 18.5 13.0 - 44.0 %    Monocytes % 9.4 2.0 - 10.0 %    Eosinophils % 3.1 0.0 - 6.0 %    Basophils % 0.8 0.0 - 2.0 %    Neutrophils Absolute 4.97 1.20 - 7.70 x10*3/uL    Immature Granulocytes Absolute, Automated 0.03 0.00 - 0.70 x10*3/uL    Lymphocytes Absolute 1.36 1.20 - 4.80 x10*3/uL    Monocytes Absolute 0.69 0.10 - 1.00 x10*3/uL    Eosinophils Absolute 0.23 0.00 - 0.70 x10*3/uL    Basophils Absolute 0.06 0.00 - 0.10 x10*3/uL   POCT GLUCOSE    Collection Time: 10/01/23  8:13 AM   Result Value Ref Range    POCT Glucose 143 (H) 74 - 99 mg/dL   Heparin Assay, UFH    Collection Time: 10/01/23 12:15 PM   Result Value Ref Range    Heparin Unfractionated <0.1 See Comment Below for Therapeutic Ranges IU/mL   aPTT - baseline    Collection Time: 10/01/23 12:15 PM   Result Value Ref Range    aPTT 31 27 - 38 seconds   Platelet count - baseline    Collection Time: 10/01/23 12:15 PM   Result Value Ref Range    Platelets 268 150 - 450 x10*3/uL   POCT GLUCOSE    Collection Time: 10/01/23 12:46  PM   Result Value Ref Range    POCT Glucose 234 (H) 74 - 99 mg/dL   POCT GLUCOSE    Collection Time: 10/01/23  4:13 PM   Result Value Ref Range    POCT Glucose 210 (H) 74 - 99 mg/dL   POCT GLUCOSE    Collection Time: 10/01/23  7:48 PM   Result Value Ref Range    POCT Glucose 199 (H) 74 - 99 mg/dL   Platelet count - HIT surveillance    Collection Time: 10/02/23  4:46 AM   Result Value Ref Range    Platelets 283 150 - 450 x10*3/uL   Magnesium    Collection Time: 10/02/23  4:46 AM   Result Value Ref Range    Magnesium 1.76 1.60 - 2.40 mg/dL   Basic Metabolic Panel    Collection Time: 10/02/23  4:46 AM   Result Value Ref Range    Glucose 112 (H) 74 - 99 mg/dL    Sodium 136 136 - 145 mmol/L    Potassium 3.8 3.5 - 5.3 mmol/L    Chloride 105 98 - 107 mmol/L    Bicarbonate 25 21 - 32 mmol/L    Anion Gap 10 10 - 20 mmol/L    Urea Nitrogen 13 6 - 23 mg/dL    Creatinine 0.83 0.50 - 1.30 mg/dL    eGFR >90 >60 mL/min/1.73m*2    Calcium 8.9 8.6 - 10.3 mg/dL   CBC and Auto Differential    Collection Time: 10/02/23  4:46 AM   Result Value Ref Range    WBC 7.8 4.4 - 11.3 x10*3/uL    nRBC 0.0 0.0 - 0.0 /100 WBCs    RBC 3.05 (L) 4.50 - 5.90 x10*6/uL    Hemoglobin 8.6 (L) 13.5 - 17.5 g/dL    Hematocrit 26.8 (L) 41.0 - 52.0 %    MCV 88 80 - 100 fL    MCH 28.2 26.0 - 34.0 pg    MCHC 32.1 32.0 - 36.0 g/dL    RDW 17.6 (H) 11.5 - 14.5 %    Platelets 283 150 - 450 x10*3/uL    MPV 9.4 7.5 - 11.5 fL    Neutrophils % 62.8 40.0 - 80.0 %    Immature Granulocytes %, Automated 0.4 0.0 - 0.9 %    Lymphocytes % 22.6 13.0 - 44.0 %    Monocytes % 8.6 2.0 - 10.0 %    Eosinophils % 5.0 0.0 - 6.0 %    Basophils % 0.6 0.0 - 2.0 %    Neutrophils Absolute 4.90 1.20 - 7.70 x10*3/uL    Immature Granulocytes Absolute, Automated 0.03 0.00 - 0.70 x10*3/uL    Lymphocytes Absolute 1.76 1.20 - 4.80 x10*3/uL    Monocytes Absolute 0.67 0.10 - 1.00 x10*3/uL    Eosinophils Absolute 0.39 0.00 - 0.70 x10*3/uL    Basophils Absolute 0.05 0.00 - 0.10 x10*3/uL   Iron and TIBC     Collection Time: 10/02/23  4:46 AM   Result Value Ref Range    Iron 41 35 - 150 ug/dL    UIBC 375 (H) 110 - 370 ug/dL    TIBC 416 240 - 445 ug/dL    % Saturation 10 (L) 25 - 45 %   Vitamin B12    Collection Time: 10/02/23  4:46 AM   Result Value Ref Range    Vitamin B12 286 211 - 911 pg/mL   POCT GLUCOSE    Collection Time: 10/02/23  8:36 AM   Result Value Ref Range    POCT Glucose 134 (H) 74 - 99 mg/dL   POCT GLUCOSE    Collection Time: 10/02/23 11:03 AM   Result Value Ref Range    POCT Glucose 146 (H) 74 - 99 mg/dL   Transthoracic Echo (TTE) Limited    Collection Time: 10/02/23 12:02 PM   Result Value Ref Range    LV A4C EF 51.9    POCT GLUCOSE    Collection Time: 10/02/23  3:12 PM   Result Value Ref Range    POCT Glucose 132 (H) 74 - 99 mg/dL   POCT GLUCOSE    Collection Time: 10/02/23  8:59 PM   Result Value Ref Range    POCT Glucose 125 (H) 74 - 99 mg/dL   Magnesium    Collection Time: 10/03/23  3:28 AM   Result Value Ref Range    Magnesium 1.79 1.60 - 2.40 mg/dL   Basic Metabolic Panel    Collection Time: 10/03/23  3:28 AM   Result Value Ref Range    Glucose 90 74 - 99 mg/dL    Sodium 134 (L) 136 - 145 mmol/L    Potassium 3.9 3.5 - 5.3 mmol/L    Chloride 105 98 - 107 mmol/L    Bicarbonate 21 21 - 32 mmol/L    Anion Gap 12 10 - 20 mmol/L    Urea Nitrogen 13 6 - 23 mg/dL    Creatinine 0.86 0.50 - 1.30 mg/dL    eGFR >90 >60 mL/min/1.73m*2    Calcium 8.7 8.6 - 10.3 mg/dL   CBC and Auto Differential    Collection Time: 10/03/23  3:28 AM   Result Value Ref Range    WBC 7.6 4.4 - 11.3 x10*3/uL    nRBC 0.0 0.0 - 0.0 /100 WBCs    RBC 2.98 (L) 4.50 - 5.90 x10*6/uL    Hemoglobin 8.2 (L) 13.5 - 17.5 g/dL    Hematocrit 26.2 (L) 41.0 - 52.0 %    MCV 88 80 - 100 fL    MCH 27.5 26.0 - 34.0 pg    MCHC 31.3 (L) 32.0 - 36.0 g/dL    RDW 17.2 (H) 11.5 - 14.5 %    Platelets 276 150 - 450 x10*3/uL    MPV 9.2 7.5 - 11.5 fL    Neutrophils % 63.0 40.0 - 80.0 %    Immature Granulocytes %, Automated 0.3 0.0 - 0.9 %    Lymphocytes %  20.5 13.0 - 44.0 %    Monocytes % 9.3 2.0 - 10.0 %    Eosinophils % 6.2 0.0 - 6.0 %    Basophils % 0.7 0.0 - 2.0 %    Neutrophils Absolute 4.81 1.20 - 7.70 x10*3/uL    Immature Granulocytes Absolute, Automated 0.02 0.00 - 0.70 x10*3/uL    Lymphocytes Absolute 1.56 1.20 - 4.80 x10*3/uL    Monocytes Absolute 0.71 0.10 - 1.00 x10*3/uL    Eosinophils Absolute 0.47 0.00 - 0.70 x10*3/uL    Basophils Absolute 0.05 0.00 - 0.10 x10*3/uL   POCT GLUCOSE    Collection Time: 10/03/23  8:31 AM   Result Value Ref Range    POCT Glucose 103 (H) 74 - 99 mg/dL   POCT GLUCOSE    Collection Time: 10/03/23 12:04 PM   Result Value Ref Range    POCT Glucose 117 (H) 74 - 99 mg/dL   POCT GLUCOSE    Collection Time: 10/03/23  4:19 PM   Result Value Ref Range    POCT Glucose 158 (H) 74 - 99 mg/dL   POCT GLUCOSE    Collection Time: 10/03/23  7:37 PM   Result Value Ref Range    POCT Glucose 191 (H) 74 - 99 mg/dL   Magnesium    Collection Time: 10/04/23  6:12 AM   Result Value Ref Range    Magnesium 1.80 1.60 - 2.40 mg/dL   Basic Metabolic Panel    Collection Time: 10/04/23  6:12 AM   Result Value Ref Range    Glucose 86 74 - 99 mg/dL    Sodium 136 136 - 145 mmol/L    Potassium 3.9 3.5 - 5.3 mmol/L    Chloride 105 98 - 107 mmol/L    Bicarbonate 23 21 - 32 mmol/L    Anion Gap 12 10 - 20 mmol/L    Urea Nitrogen 17 6 - 23 mg/dL    Creatinine 0.90 0.50 - 1.30 mg/dL    eGFR >90 >60 mL/min/1.73m*2    Calcium 8.9 8.6 - 10.3 mg/dL   CBC and Auto Differential    Collection Time: 10/04/23  6:12 AM   Result Value Ref Range    WBC 7.4 4.4 - 11.3 x10*3/uL    nRBC 0.0 0.0 - 0.0 /100 WBCs    RBC 3.26 (L) 4.50 - 5.90 x10*6/uL    Hemoglobin 9.0 (L) 13.5 - 17.5 g/dL    Hematocrit 28.8 (L) 41.0 - 52.0 %    MCV 88 80 - 100 fL    MCH 27.6 26.0 - 34.0 pg    MCHC 31.3 (L) 32.0 - 36.0 g/dL    RDW 17.2 (H) 11.5 - 14.5 %    Platelets 292 150 - 450 x10*3/uL    MPV 8.9 7.5 - 11.5 fL    Neutrophils % 61.3 40.0 - 80.0 %    Immature Granulocytes %, Automated 0.4 0.0 - 0.9 %     Lymphocytes % 20.2 13.0 - 44.0 %    Monocytes % 10.1 2.0 - 10.0 %    Eosinophils % 7.3 0.0 - 6.0 %    Basophils % 0.7 0.0 - 2.0 %    Neutrophils Absolute 4.53 1.20 - 7.70 x10*3/uL    Immature Granulocytes Absolute, Automated 0.03 0.00 - 0.70 x10*3/uL    Lymphocytes Absolute 1.49 1.20 - 4.80 x10*3/uL    Monocytes Absolute 0.75 0.10 - 1.00 x10*3/uL    Eosinophils Absolute 0.54 0.00 - 0.70 x10*3/uL    Basophils Absolute 0.05 0.00 - 0.10 x10*3/uL   POCT GLUCOSE    Collection Time: 10/04/23  8:42 AM   Result Value Ref Range    POCT Glucose 89 74 - 99 mg/dL   POCT GLUCOSE    Collection Time: 10/04/23 12:14 PM   Result Value Ref Range    POCT Glucose 127 (H) 74 - 99 mg/dL       ECG  Encounter Date: 10/10/23   ECG 12 Lead    Narrative    Sinus rhythm, LVH, possible inferior infarct age undetermined, heart rate   63 bpm.       Echocardiogram  No results found for this or any previous visit from the past 1095 days.      CV Studies:  EKG:  Encounter Date: 10/10/23   ECG 12 Lead    Narrative    Sinus rhythm, LVH, possible inferior infarct age undetermined, heart rate   63 bpm.     Echocardiogram:   [place] [address]  Phone [phone] Fax [phone]    TRANSTHORACIC ECHOCARDIOGRAM REPORT      Patient Name:     KVNG Sandy Physician:  [id number] Yesenia Seth MD  Study Date:       1/9/2023           Referring           SILVIA ALCALA  Physician:  MRN/PID:          [id number]           PCP:  Accession/Order#: [id number]          [facility] Echo [facility]  Location:  YOB: 1960          Fellow:  Gender:           M                  Nurse:              Analilia Zuluaga RN PRN  Admit Date:       1/7/2023           Sonographer:        Madhuri [facility]  Admission Status: Inpatient -        Additional Staff:  Routine  Height:           180.34 cm          CC Report to:  Weight:           90.27 kg            Study Type:         Echocardiogram  BSA:              2.10 m2  Blood Pressure: 167 /81 mmHg    Diagnosis/ICD: G45.8-Other transient cerebral ischemic attacks and related  syndromes  Indication:    Stroke  Procedure/CPT: Echo Complete w Full Doppler-08065    Patient History:  Pertinent History: CVA, HLD, DMII.    Study Detail: The following Echo studies were performed: 2D, M-Mode, Doppler and  color flow. Technically challenging study due to poor acoustic  windows and prominent lung artifact. Optison used as a contrast  agent for endocardial border definition and agitated saline used  as a contrast agent for intraseptal flow evaluation. Total  contrast used for this procedure was 3 mL via IV push.      PHYSICIAN INTERPRETATION:  Left Ventricle: Left ventricular systolic function is normal, with an estimated ejection fraction of 60-65%. There are no regional wall motion abnormalities. The left ventricular cavity size is normal. Spectral Doppler shows a normal pattern of left ventricular diastolic filling.  Left Atrium: The left atrium is normal in size. A bubble study using agitated saline was performed. Bubble study is negative.  Right Ventricle: The right ventricle was not well visualized. There is normal right ventricular global systolic function. There is a possible device noted in the right ventricle.  Right Atrium: The right atrium is normal in size.  Aortic Valve: The aortic valve is probably trileaflet. There is evidence of mild aortic valve stenosis.  The aortic valve dimensionless index is 0.58. There is no evidence of aortic valve regurgitation. The peak instantaneous gradient of the aortic valve is 16.3 mmHg. The mean gradient of the aortic valve is 9.0 mmHg.  Mitral Valve: The mitral valve is normal in structure. There is no evidence of mitral valve regurgitation.  Tricuspid Valve: The tricuspid valve is structurally normal. No evidence of tricuspid regurgitation.  Pulmonic Valve: The pulmonic valve is  structurally normal. There is physiologic pulmonic valve regurgitation.  Pericardium: There is no pericardial effusion noted.  Aorta: The aortic root is abnormal. There is mild dilatation of the ascending aorta.      CONCLUSIONS:  1. Left ventricular systolic function is normal with a 60-65% estimated ejection fraction.  2. Mild aortic valve stenosis.    QUANTITATIVE DATA SUMMARY:  2D MEASUREMENTS:  Normal Ranges:  Ao Root d:     3.70 cm   (2.0-3.7cm)  LAs:           4.00 cm   (2.7-4.0cm)  IVSd:          1.24 cm   (0.6-1.1cm)  LVPWd:         1.10 cm   (0.6-1.1cm)  LVIDd:         4.51 cm   (3.9-5.9cm)  LVIDs:         3.09 cm  LV Mass Index: 91.1 g/m2  LV % FS        31.5 %    LA VOLUME:  Normal Ranges:  LA Vol A4C:        40.1 ml    (22+/-6mL/m2)  LA Vol A2C:        50.5 ml  LA Vol BP:         47.0 ml  LA Vol Index A4C:  19.1ml/m2  LA Vol Index A2C:  24.0 ml/m2  LA Vol Index BP:   22.3 ml/m2  LA Area A4C:       16.4 cm2  LA Area A2C:       19.2 cm2  LA Major Axis A4C: 5.7 cm  LA Major Axis A2C: 6.2 cm  LA Volume Index:   22.0 ml/m2    RA VOLUME BY A/L METHOD:  Normal Ranges:  RA Area A4C: 14.3 cm2    AORTA MEASUREMENTS:  Normal Ranges:  Ao Sinus, d: 3.70 cm (2.1-3.5cm)  Ao STJ, d:   3.10 cm (1.7-3.4cm)  Asc Ao, d:   4.00 cm (2.1-3.4cm)    LV SYSTOLIC FUNCTION BY 2D PLANIMETRY (MOD):  Normal Ranges:  EF-A4C View: 73.9 % (>=55%)  EF-A2C View: 62.8 %  EF-Biplane:  67.0 %    LV DIASTOLIC FUNCTION:  Normal Ranges:  MV Peak E:        0.80 m/s    (0.7-1.2 m/s)  MV Peak A:        1.28 m/s    (0.42-0.7 m/s)  E/A Ratio:        0.63        (1.0-2.2)  MV e'             0.06 m/s    (>8.0)  MV lateral e'     0.08 m/s  MV medial e'      0.05 m/s  MV A Dur:         108.00 msec  E/e' Ratio:       12.38       (<8.0)  PulmV A Revs Dur: 148.00 msec    AORTIC VALVE:  Normal Ranges:  AoV Vmax:                2.02 m/s  (<=1.7m/s)  AoV Peak P.3 mmHg (<20mmHg)  AoV Mean P.0 mmHg  (1.7-11.5mmHg)  LVOT Max Miki:             1.24 m/s  (<=1.1m/s)  AoV VTI:                 35.20 cm  (18-25cm)  LVOT VTI:                20.50 cm  LVOT Diameter:           2.00 cm   (1.8-2.4cm)  AoV Area, VTI:           1.83 cm2  (2.5-5.5cm2)  AoV Area,Vmax:           1.93 cm2  (2.5-4.5cm2)  AoV Dimensionless Index: 0.58    RIGHT VENTRICLE:  RV 1   3.5 cm  TAPSE: 22.5 mm  RV s'  0.19 m/s    TRICUSPID VALVE/RVSP:  Normal Ranges:  TV E Vmax: 0.70 m/s (0.3-0.7m/s)  TV A Vmax: 1.08 m/s  IVC Diam:  1.40 cm    Pulmonary Veins:  PulmV A Revs Dur: 148.00 msec      [id number] Yesenia Seth MD  Electronically signed on 1/9/2023 at 11:47:36 AM         Final     Stress Testing IMGRESULT(MHX9183:1:1825): No results found for this or any previous visit from the past 1825 days.    Cardiac Catheterization: No results found for this or any previous visit from the past 1825 days.  No results found for this or any previous visit from the past 3650 days.     Cardiac Scoring: No results found for this or any previous visit from the past 1825 days.    AAA : No results found for this or any previous visit from the past 1825 days.    OTHER: No results found for this or any previous visit from the past 1825 days.    LAST IMAGING RESULTS  Vascular US lower extremity [place] [address]       Phone [phone] Fax [phone]       Vascular Lab Report     VASC US LOWER EXTREMITY VEIN MAPPING BILATERAL    Patient Name:      KVNG Sandy Physician:  [id number] Thad Taylor MD  Study Date:        12/27/2023          Ordering Provider:  [id number] ANDI MAZARIEGOS  MRN/PID:           [id number]            Fellow:  Accession#:        [id number]        Technologist:       Cora Mason RVELISA  Date of Birth/Age: 1960 / 63      Technologist 2:                     years  Gender:            M                   Encounter#:          [id number]  Admission Status:  Outpatient          Location Performed: [facility]       Diagnosis/ICD: Encounter for other preprocedural examination-Z01.818       CONCLUSIONS:  Left Lower Vein Mapping: The left great saphenous vein appears widely patent with no evidence of thrombosis or fibrosis.  Right Lower Vein Mapping: The right great saphenous vein appears widely patent with no evidence of thrombosis or fibrosis. Right lower extremity vein: Thick walls noted in the GSV of the mid and distal calf.     Imaging & Doppler Findings:     Right          Compress Thrombus  Diam  SFJ              Yes      None  Prox Thigh GSV   Yes      None   6.2 mm  Mid Thigh GSV    Yes      None   2.6 mm  Knee GSV         Yes      None   1.9 mm  Prox Calf GSV    Yes      None   2.3 mm  Mid Calf GSV     Yes      None   2.5 mm  Dist Calf GSV    Yes      None   2.4 mm       Left           Compress Thrombus  Diam  SFJ              Yes      None  Prox Thigh GSV   Yes      None   4.7 mm  Mid Thigh GSV    Yes      None   3.4 mm  Knee GSV         Yes      None   3.4 mm  Prox Calf GSV    Yes      None   3.0 mm  Mid Calf GSV     Yes      None   2.5 mm  Dist Calf GSV    Yes      None   3.0 mm       [id number] Thad Taylor MD  Electronically signed by [id number] Thad Taylor MD on 12/27/2023 at 10:29:03 AM       ** Final **  Vascular US carotid [place] [address]       Phone [phone] Fax [phone]       Vascular Lab Report     [facility] CAR[facility]ATER[facility]    Patient Name:      KVNG Sandy Physician:  [id number] Taylor MD  Study Date:        12/27/2023          Ordering Provider:  [id number] ANDI MAZARIEGOS  MRN/PID:           [id number]            Fellow:  Accession#:        [id number]        Technologist:       Cora Mason RVT  Date of Birth/Age: 1960 / 63       Technologist 2:                     years  Gender:            M                   Encounter#:         [id number]  Admission Status:  Outpatient          Location Performed: [facility]       Diagnosis/ICD: Encounter for other preprocedural examination-Z01.818; Occlusion                 and stenosis of bilateral carotid arteries-I65.23       CONCLUSIONS:  Right Carotid: Findings are consistent with 50 to 69% stenosis of the right proximal internal carotid artery. Turbulent flow seen by color Doppler. Right external carotid artery appears patent with no evidence of stenosis. The right vertebral artery is patent with antegrade flow. No evidence of hemodynamically significant stenosis in the right subclavian artery.  Left Carotid: Today's exam was limited due to the heavy calcification with resulting shadowing. Findings are consistent with less than 50% stenosis of the left proximal internal carotid artery. Turbulent flow seen by color Doppler. Left external carotid artery appears patent with no evidence of stenosis. The left vertebral artery is patent with antegrade flow. No evidence of hemodynamically significant stenosis in the left subclavian artery.     Imaging & Doppler Findings:  Right Plaque Morph: The proximal right internal carotid artery demonstrates calcified plaque.  Left Plaque Morph: The mid left common carotid artery demonstrates calcified plaque. The distal left common carotid artery demonstrates calcified plaque.      Right                       Left    PSV      EDV               PSV      EDV  107 cm/s           CCA P   108 cm/s  116 cm/s           CCA D   122 cm/s  190 cm/s 21 cm/s   ICA P   82 cm/s  13 cm/s  89 cm/s  14 cm/s   ICA D   85 cm/s  19 cm/s  261 cm/s            ECA    157 cm/s  67 cm/s  21 cm/s Vertebral 70 cm/s  18 cm/s     Right                                  Left    PSV    Waveform                       PSV    Waveform  231 cm/s          Subclavian Proximal 103 cm/s                   Right Left  ICA/CCA Ratio  1.6  0.7          [id number] Thad Taylor MD  Electronically signed by [id number] Thad Taylor MD on 12/27/2023 at 10:19:36 AM       ** Final **    Problem List Items Addressed This Visit       HTN (hypertension) (Chronic)    HLD (hyperlipidemia) (Chronic)    NSTEMI (non-ST elevated myocardial infarction) (CMS/HCC)    Diabetes mellitus type II, non insulin dependent (CMS/HCC)    Aortic valve disease    Coronary artery disease involving native coronary artery of native heart without angina pectoris - Primary    Bilateral carotid artery stenosis    Anemia   ,     Umesh Newyb DO, FACC, FACOI

## 2024-01-11 RX ORDER — FOLIC ACID 1 MG/1
1000 TABLET ORAL DAILY
Qty: 30 TABLET | Refills: 5 | Status: SHIPPED | OUTPATIENT
Start: 2024-01-11

## 2024-01-11 RX ORDER — LOSARTAN POTASSIUM 50 MG/1
50 TABLET ORAL DAILY
COMMUNITY
Start: 2023-11-02 | End: 2024-01-16 | Stop reason: SDUPTHER

## 2024-01-15 DIAGNOSIS — I63.9 CEREBROVASCULAR ACCIDENT (CVA), UNSPECIFIED MECHANISM (MULTI): ICD-10-CM

## 2024-01-16 ENCOUNTER — OFFICE VISIT (OUTPATIENT)
Dept: CARDIOLOGY | Facility: CLINIC | Age: 64
End: 2024-01-16
Payer: MEDICARE

## 2024-01-16 VITALS
WEIGHT: 197 LBS | DIASTOLIC BLOOD PRESSURE: 68 MMHG | HEIGHT: 70 IN | HEART RATE: 62 BPM | SYSTOLIC BLOOD PRESSURE: 148 MMHG | BODY MASS INDEX: 28.2 KG/M2

## 2024-01-16 DIAGNOSIS — I35.9 AORTIC VALVE DISEASE: ICD-10-CM

## 2024-01-16 DIAGNOSIS — I21.4 NSTEMI (NON-ST ELEVATED MYOCARDIAL INFARCTION) (MULTI): ICD-10-CM

## 2024-01-16 DIAGNOSIS — I10 PRIMARY HYPERTENSION: Chronic | ICD-10-CM

## 2024-01-16 DIAGNOSIS — I65.23 BILATERAL CAROTID ARTERY STENOSIS: ICD-10-CM

## 2024-01-16 DIAGNOSIS — E78.00 PURE HYPERCHOLESTEROLEMIA: Chronic | ICD-10-CM

## 2024-01-16 DIAGNOSIS — E78.5 HYPERLIPIDEMIA, UNSPECIFIED HYPERLIPIDEMIA TYPE: Chronic | ICD-10-CM

## 2024-01-16 DIAGNOSIS — D64.9 ANEMIA, UNSPECIFIED TYPE: ICD-10-CM

## 2024-01-16 DIAGNOSIS — E11.9 DIABETES MELLITUS TYPE II, NON INSULIN DEPENDENT (MULTI): ICD-10-CM

## 2024-01-16 DIAGNOSIS — I25.10 CORONARY ARTERY DISEASE INVOLVING NATIVE CORONARY ARTERY OF NATIVE HEART WITHOUT ANGINA PECTORIS: Primary | ICD-10-CM

## 2024-01-16 DIAGNOSIS — D50.8 IRON DEFICIENCY ANEMIA SECONDARY TO INADEQUATE DIETARY IRON INTAKE: ICD-10-CM

## 2024-01-16 PROCEDURE — 3077F SYST BP >= 140 MM HG: CPT | Performed by: INTERNAL MEDICINE

## 2024-01-16 PROCEDURE — 3078F DIAST BP <80 MM HG: CPT | Performed by: INTERNAL MEDICINE

## 2024-01-16 PROCEDURE — 99215 OFFICE O/P EST HI 40 MIN: CPT | Performed by: INTERNAL MEDICINE

## 2024-01-16 PROCEDURE — 1036F TOBACCO NON-USER: CPT | Performed by: INTERNAL MEDICINE

## 2024-01-16 PROCEDURE — 93000 ELECTROCARDIOGRAM COMPLETE: CPT | Performed by: INTERNAL MEDICINE

## 2024-01-16 PROCEDURE — 4010F ACE/ARB THERAPY RXD/TAKEN: CPT | Performed by: INTERNAL MEDICINE

## 2024-01-16 RX ORDER — LOSARTAN POTASSIUM 50 MG/1
100 TABLET ORAL DAILY
Qty: 90 TABLET | Refills: 1 | Status: SHIPPED | OUTPATIENT
Start: 2024-01-16 | End: 2024-04-09

## 2024-01-20 DIAGNOSIS — E11.649 TYPE 2 DIABETES MELLITUS WITH HYPOGLYCEMIA WITHOUT COMA, WITHOUT LONG-TERM CURRENT USE OF INSULIN (MULTI): ICD-10-CM

## 2024-01-21 RX ORDER — CARVEDILOL 3.12 MG/1
3.12 TABLET ORAL
Qty: 120 TABLET | Refills: 1 | Status: SHIPPED | OUTPATIENT
Start: 2024-01-21

## 2024-01-26 DIAGNOSIS — E11.649 TYPE 2 DIABETES MELLITUS WITH HYPOGLYCEMIA WITHOUT COMA, WITHOUT LONG-TERM CURRENT USE OF INSULIN (MULTI): ICD-10-CM

## 2024-01-26 RX ORDER — NAPROXEN SODIUM 220 MG/1
81 TABLET, FILM COATED ORAL DAILY
Qty: 30 TABLET | Refills: 3 | OUTPATIENT
Start: 2024-01-26

## 2024-02-01 ENCOUNTER — APPOINTMENT (OUTPATIENT)
Dept: RESPIRATORY THERAPY | Facility: HOSPITAL | Age: 64
End: 2024-02-01
Payer: MEDICARE

## 2024-02-01 ENCOUNTER — HOSPITAL ENCOUNTER (OUTPATIENT)
Dept: RADIOLOGY | Facility: HOSPITAL | Age: 64
Discharge: HOME | End: 2024-02-01
Payer: MEDICARE

## 2024-02-01 DIAGNOSIS — I25.10 CORONARY ARTERY DISEASE INVOLVING NATIVE CORONARY ARTERY OF NATIVE HEART WITHOUT ANGINA PECTORIS: ICD-10-CM

## 2024-02-01 PROCEDURE — 71250 CT THORAX DX C-: CPT

## 2024-02-06 ENCOUNTER — OFFICE VISIT (OUTPATIENT)
Dept: PRIMARY CARE CLINIC | Age: 64
End: 2024-02-06
Payer: MEDICARE

## 2024-02-06 VITALS
SYSTOLIC BLOOD PRESSURE: 126 MMHG | TEMPERATURE: 97.3 F | WEIGHT: 197 LBS | DIASTOLIC BLOOD PRESSURE: 73 MMHG | HEIGHT: 71 IN | BODY MASS INDEX: 27.58 KG/M2 | HEART RATE: 78 BPM | OXYGEN SATURATION: 96 %

## 2024-02-06 DIAGNOSIS — Z76.0 MEDICATION REFILL: ICD-10-CM

## 2024-02-06 DIAGNOSIS — E11.621 DIABETIC ULCER OF LEFT HEEL ASSOCIATED WITH TYPE 2 DIABETES MELLITUS, WITH NECROSIS OF BONE (HCC): ICD-10-CM

## 2024-02-06 DIAGNOSIS — L97.424 DIABETIC ULCER OF LEFT HEEL ASSOCIATED WITH TYPE 2 DIABETES MELLITUS, WITH NECROSIS OF BONE (HCC): ICD-10-CM

## 2024-02-06 DIAGNOSIS — L97.529 TYPE 2 DIABETES MELLITUS WITH LEFT DIABETIC FOOT ULCER (HCC): ICD-10-CM

## 2024-02-06 DIAGNOSIS — Z00.00 INITIAL MEDICARE ANNUAL WELLNESS VISIT: Primary | ICD-10-CM

## 2024-02-06 DIAGNOSIS — E11.621 TYPE 2 DIABETES MELLITUS WITH LEFT DIABETIC FOOT ULCER (HCC): ICD-10-CM

## 2024-02-06 PROCEDURE — G0438 PPPS, INITIAL VISIT: HCPCS | Performed by: FAMILY MEDICINE

## 2024-02-06 PROCEDURE — 3074F SYST BP LT 130 MM HG: CPT | Performed by: FAMILY MEDICINE

## 2024-02-06 PROCEDURE — 3046F HEMOGLOBIN A1C LEVEL >9.0%: CPT | Performed by: FAMILY MEDICINE

## 2024-02-06 PROCEDURE — 3078F DIAST BP <80 MM HG: CPT | Performed by: FAMILY MEDICINE

## 2024-02-06 PROCEDURE — G8484 FLU IMMUNIZE NO ADMIN: HCPCS | Performed by: FAMILY MEDICINE

## 2024-02-06 PROCEDURE — 3017F COLORECTAL CA SCREEN DOC REV: CPT | Performed by: FAMILY MEDICINE

## 2024-02-06 RX ORDER — LOSARTAN POTASSIUM 50 MG/1
100 TABLET ORAL 2 TIMES DAILY
COMMUNITY
Start: 2024-01-17

## 2024-02-06 RX ORDER — GABAPENTIN 300 MG/1
300 CAPSULE ORAL 2 TIMES DAILY
Qty: 180 CAPSULE | Refills: 1 | Status: SHIPPED | OUTPATIENT
Start: 2024-02-06 | End: 2024-08-04

## 2024-02-06 ASSESSMENT — PATIENT HEALTH QUESTIONNAIRE - PHQ9
SUM OF ALL RESPONSES TO PHQ QUESTIONS 1-9: 0
1. LITTLE INTEREST OR PLEASURE IN DOING THINGS: 0
SUM OF ALL RESPONSES TO PHQ QUESTIONS 1-9: 0
SUM OF ALL RESPONSES TO PHQ9 QUESTIONS 1 & 2: 0
2. FEELING DOWN, DEPRESSED OR HOPELESS: 0
SUM OF ALL RESPONSES TO PHQ QUESTIONS 1-9: 0
SUM OF ALL RESPONSES TO PHQ QUESTIONS 1-9: 0

## 2024-02-12 DIAGNOSIS — M54.50 CHRONIC BILATERAL LOW BACK PAIN WITHOUT SCIATICA: ICD-10-CM

## 2024-02-12 DIAGNOSIS — G89.29 CHRONIC BILATERAL LOW BACK PAIN WITHOUT SCIATICA: ICD-10-CM

## 2024-02-12 NOTE — TELEPHONE ENCOUNTER
traMADol (ULTRAM) 50 MG tablet     Last Appointment:  2/6/2024  Future Appointments   Date Time Provider Department Center   5/10/2024 11:00 AM Genet Kraus DO [facility]

## 2024-02-13 ENCOUNTER — HOSPITAL ENCOUNTER (OUTPATIENT)
Dept: RESPIRATORY THERAPY | Facility: HOSPITAL | Age: 64
Discharge: HOME | End: 2024-02-13
Payer: MEDICARE

## 2024-02-13 DIAGNOSIS — I25.10 CORONARY ARTERY DISEASE INVOLVING NATIVE CORONARY ARTERY OF NATIVE HEART WITHOUT ANGINA PECTORIS: ICD-10-CM

## 2024-02-13 LAB
ANION GAP BLDA CALCULATED.4IONS-SCNC: 13 MMO/L (ref 10–25)
BASE EXCESS BLDA CALC-SCNC: -2 MMOL/L (ref -2–3)
BASE EXCESS BLDA CALC-SCNC: -2 MMOL/L (ref -2–3)
BODY TEMPERATURE: 37 DEGREES CELSIUS
BODY TEMPERATURE: 37 DEGREES CELSIUS
CA-I BLDA-SCNC: 1.14 MMOL/L (ref 1.1–1.33)
CHLORIDE BLDA-SCNC: 102 MMOL/L (ref 98–107)
COHGB MFR BLDA: 1.5 %
DO-HGB MFR BLDA: 8.1 % (ref 0–5)
GLUCOSE BLDA-MCNC: 200 MG/DL (ref 74–99)
HCO3 BLDA-SCNC: 22.3 MMOL/L (ref 22–26)
HCO3 BLDA-SCNC: 22.3 MMOL/L (ref 22–26)
HCT VFR BLD EST: 39 % (ref 41–52)
HGB BLDA-MCNC: 13 G/DL (ref 13.5–17.5)
HGB BLDA-MCNC: 13 G/DL (ref 13.5–17.5)
INHALED O2 CONCENTRATION: 21 %
INHALED O2 CONCENTRATION: 21 %
LACTATE BLDA-SCNC: 1.6 MMOL/L (ref 0.4–2)
METHGB MFR BLDA: 0 % (ref 0–1.5)
OXYHGB MFR BLDA: 89.3 % (ref 94–98)
OXYHGB MFR BLDA: 89.3 % (ref 94–98)
PCO2 BLDA: 36 MM HG (ref 38–42)
PCO2 BLDA: 36 MM HG (ref 38–42)
PH BLDA: 7.4 PH (ref 7.38–7.42)
PH BLDA: 7.4 PH (ref 7.38–7.42)
PO2 BLDA: 62 MM HG (ref 85–95)
PO2 BLDA: 62 MM HG (ref 85–95)
POTASSIUM BLDA-SCNC: 4.9 MMOL/L (ref 3.5–5.3)
SAO2 % BLDA: 91 % (ref 94–100)
SAO2 % BLDA: 91 % (ref 94–100)
SODIUM BLDA-SCNC: 132 MMOL/L (ref 136–145)

## 2024-02-13 PROCEDURE — 36600 WITHDRAWAL OF ARTERIAL BLOOD: CPT

## 2024-02-13 PROCEDURE — 94010 BREATHING CAPACITY TEST: CPT | Performed by: THORACIC SURGERY (CARDIOTHORACIC VASCULAR SURGERY)

## 2024-02-13 PROCEDURE — 94729 DIFFUSING CAPACITY: CPT | Performed by: THORACIC SURGERY (CARDIOTHORACIC VASCULAR SURGERY)

## 2024-02-13 PROCEDURE — 84132 ASSAY OF SERUM POTASSIUM: CPT | Performed by: THORACIC SURGERY (CARDIOTHORACIC VASCULAR SURGERY)

## 2024-02-13 PROCEDURE — 82375 ASSAY CARBOXYHB QUANT: CPT | Performed by: THORACIC SURGERY (CARDIOTHORACIC VASCULAR SURGERY)

## 2024-02-13 RX ORDER — TRAMADOL HYDROCHLORIDE 50 MG/1
50 TABLET ORAL EVERY 8 HOURS PRN
Qty: 180 TABLET | Refills: 0 | Status: SHIPPED | OUTPATIENT
Start: 2024-02-13 | End: 2024-05-13

## 2024-02-13 RX ORDER — ALBUTEROL SULFATE 90 UG/1
4 AEROSOL, METERED RESPIRATORY (INHALATION) ONCE
Status: DISCONTINUED | OUTPATIENT
Start: 2024-02-13 | End: 2024-02-14 | Stop reason: HOSPADM

## 2024-02-13 ASSESSMENT — PAIN SCALES - GENERAL: PAINLEVEL_OUTOF10: 0 - NO PAIN

## 2024-02-13 ASSESSMENT — PAIN - FUNCTIONAL ASSESSMENT: PAIN_FUNCTIONAL_ASSESSMENT: 0-10

## 2024-02-19 LAB
MGC ASCENT PFT - FEV1 - PRE: 3.4
MGC ASCENT PFT - FEV1 - PREDICTED: 3.24
MGC ASCENT PFT - FVC - PRE: 4.23
MGC ASCENT PFT - FVC - PREDICTED: 4.22

## 2024-02-22 DIAGNOSIS — I63.9 CEREBROVASCULAR ACCIDENT (CVA), UNSPECIFIED MECHANISM (HCC): ICD-10-CM

## 2024-02-22 NOTE — TELEPHONE ENCOUNTER
Last Appointment   2/6/2024  Next Appointment  5/10/2024    Requesting refill on clopidogrel to [facility]

## 2024-02-23 RX ORDER — CLOPIDOGREL BISULFATE 75 MG/1
75 TABLET ORAL DAILY
Qty: 90 TABLET | Refills: 1 | Status: SHIPPED | OUTPATIENT
Start: 2024-02-23 | End: 2025-02-17

## 2024-03-20 DIAGNOSIS — E11.649 TYPE 2 DIABETES MELLITUS WITH HYPOGLYCEMIA WITHOUT COMA, WITHOUT LONG-TERM CURRENT USE OF INSULIN (MULTI): ICD-10-CM

## 2024-03-20 RX ORDER — FERROUS SULFATE TAB 325 MG (65 MG ELEMENTAL FE) 325 (65 FE) MG
1 TAB ORAL DAILY
Qty: 90 TABLET | Refills: 1 | Status: SHIPPED | OUTPATIENT
Start: 2024-03-20

## 2024-03-20 RX ORDER — ATORVASTATIN CALCIUM 80 MG/1
80 TABLET, FILM COATED ORAL DAILY
Qty: 90 TABLET | Refills: 1 | Status: SHIPPED | OUTPATIENT
Start: 2024-03-20

## 2024-03-22 DIAGNOSIS — M54.50 CHRONIC MIDLINE LOW BACK PAIN WITHOUT SCIATICA: ICD-10-CM

## 2024-03-22 DIAGNOSIS — G89.29 CHRONIC MIDLINE LOW BACK PAIN WITHOUT SCIATICA: ICD-10-CM

## 2024-03-22 RX ORDER — CYCLOBENZAPRINE HCL 5 MG
TABLET ORAL
Qty: 90 TABLET | Refills: 1 | Status: SHIPPED | OUTPATIENT
Start: 2024-03-22

## 2024-03-28 ENCOUNTER — TELEPHONE (OUTPATIENT)
Dept: PRIMARY CARE CLINIC | Age: 64
End: 2024-03-28

## 2024-03-28 NOTE — TELEPHONE ENCOUNTER
I prescribed 180 tablets, but only 90 were filled per our system.  He should call the pharmacy as the remainder should still be available.  If not, let me know.   RN contacted pt, informed he that OCP RX was sent to pharmacy, pharmacy confirmed. Pt has no other concerns at this time.       Disp Refills Start End    Norethindrone Acet-Ethinyl Est (MICROGESTIN 1/20) 1-20 MG-MCG Oral Tab 3 Package 3 2/3/2021     Sig - R

## 2024-03-28 NOTE — TELEPHONE ENCOUNTER
Pt called stating he is running low on tramadol , when you refilled it it says for 180 tablets but pt states the bottle he has says 90 tablets. Was last filled 2/13/24

## 2024-04-02 RX ORDER — FERROUS SULFATE 325(65) MG
325 TABLET ORAL
Qty: 90 TABLET | Refills: 1 | Status: SHIPPED | OUTPATIENT
Start: 2024-04-02

## 2024-04-02 RX ORDER — ERGOCALCIFEROL 1.25 MG/1
50000 CAPSULE ORAL WEEKLY
Qty: 12 CAPSULE | Refills: 1 | Status: SHIPPED | OUTPATIENT
Start: 2024-04-02

## 2024-04-09 DIAGNOSIS — I25.10 CORONARY ARTERY DISEASE INVOLVING NATIVE CORONARY ARTERY OF NATIVE HEART WITHOUT ANGINA PECTORIS: ICD-10-CM

## 2024-04-09 DIAGNOSIS — E11.9 DIABETES MELLITUS TYPE II, NON INSULIN DEPENDENT (MULTI): ICD-10-CM

## 2024-04-09 DIAGNOSIS — I10 PRIMARY HYPERTENSION: Chronic | ICD-10-CM

## 2024-04-09 DIAGNOSIS — D50.8 IRON DEFICIENCY ANEMIA SECONDARY TO INADEQUATE DIETARY IRON INTAKE: ICD-10-CM

## 2024-04-09 DIAGNOSIS — I21.4 NSTEMI (NON-ST ELEVATED MYOCARDIAL INFARCTION) (MULTI): ICD-10-CM

## 2024-04-09 DIAGNOSIS — E78.5 HYPERLIPIDEMIA, UNSPECIFIED HYPERLIPIDEMIA TYPE: Chronic | ICD-10-CM

## 2024-04-09 DIAGNOSIS — I65.23 BILATERAL CAROTID ARTERY STENOSIS: ICD-10-CM

## 2024-04-09 DIAGNOSIS — I35.9 AORTIC VALVE DISEASE: ICD-10-CM

## 2024-04-09 DIAGNOSIS — E78.00 PURE HYPERCHOLESTEROLEMIA: Chronic | ICD-10-CM

## 2024-04-09 DIAGNOSIS — D64.9 ANEMIA, UNSPECIFIED TYPE: ICD-10-CM

## 2024-04-09 RX ORDER — LOSARTAN POTASSIUM 50 MG/1
TABLET ORAL
Qty: 90 TABLET | Refills: 1 | Status: SHIPPED | OUTPATIENT
Start: 2024-04-09

## 2024-05-02 DIAGNOSIS — K21.9 GASTROESOPHAGEAL REFLUX DISEASE WITHOUT ESOPHAGITIS: ICD-10-CM

## 2024-05-02 DIAGNOSIS — I25.10 CORONARY ARTERY DISEASE INVOLVING NATIVE CORONARY ARTERY OF NATIVE HEART WITHOUT ANGINA PECTORIS: ICD-10-CM

## 2024-05-02 DIAGNOSIS — L97.424 DIABETIC ULCER OF LEFT HEEL ASSOCIATED WITH TYPE 2 DIABETES MELLITUS, WITH NECROSIS OF BONE (HCC): ICD-10-CM

## 2024-05-02 DIAGNOSIS — E11.621 DIABETIC ULCER OF LEFT HEEL ASSOCIATED WITH TYPE 2 DIABETES MELLITUS, WITH NECROSIS OF BONE (HCC): ICD-10-CM

## 2024-05-02 RX ORDER — PANTOPRAZOLE SODIUM 40 MG/1
40 TABLET, DELAYED RELEASE ORAL DAILY
Qty: 90 TABLET | Refills: 1 | Status: SHIPPED | OUTPATIENT
Start: 2024-05-02

## 2024-05-02 NOTE — TELEPHONE ENCOUNTER
Last Appointment   2/6/2024  Next Appointment  5/10/2024      E script refill request for jardiance 10 mg and pantoprazole 40 mg , [facility]

## 2024-05-08 DIAGNOSIS — M54.50 CHRONIC BILATERAL LOW BACK PAIN WITHOUT SCIATICA: ICD-10-CM

## 2024-05-08 DIAGNOSIS — G89.29 CHRONIC BILATERAL LOW BACK PAIN WITHOUT SCIATICA: ICD-10-CM

## 2024-05-08 RX ORDER — TRAMADOL HYDROCHLORIDE 50 MG/1
50 TABLET ORAL EVERY 8 HOURS PRN
Qty: 90 TABLET | Refills: 0 | Status: SHIPPED | OUTPATIENT
Start: 2024-05-08 | End: 2024-06-22

## 2024-05-08 NOTE — TELEPHONE ENCOUNTER
Last Appointment   2/6/2024  Next Appointment  5/10/2024    Requesting refill of tramadol 50 mg to [facility] [place]

## 2024-05-10 ENCOUNTER — OFFICE VISIT (OUTPATIENT)
Dept: PRIMARY CARE CLINIC | Age: 64
End: 2024-05-10
Payer: MEDICARE

## 2024-05-10 VITALS
BODY MASS INDEX: 27.58 KG/M2 | DIASTOLIC BLOOD PRESSURE: 73 MMHG | SYSTOLIC BLOOD PRESSURE: 143 MMHG | TEMPERATURE: 97.2 F | HEIGHT: 71 IN | OXYGEN SATURATION: 97 % | WEIGHT: 197 LBS | HEART RATE: 79 BPM

## 2024-05-10 DIAGNOSIS — E11.621 TYPE 2 DIABETES MELLITUS WITH LEFT DIABETIC FOOT ULCER (HCC): Primary | ICD-10-CM

## 2024-05-10 DIAGNOSIS — L97.529 TYPE 2 DIABETES MELLITUS WITH LEFT DIABETIC FOOT ULCER (HCC): Primary | ICD-10-CM

## 2024-05-10 PROBLEM — I25.2 HISTORY OF MI (MYOCARDIAL INFARCTION): Status: ACTIVE | Noted: 2023-09-30

## 2024-05-10 PROBLEM — M86.9 OSTEOMYELITIS OF LEFT LOWER EXTREMITY (HCC): Status: ACTIVE | Noted: 2021-05-14

## 2024-05-10 PROCEDURE — 3077F SYST BP >= 140 MM HG: CPT | Performed by: FAMILY MEDICINE

## 2024-05-10 PROCEDURE — G8419 CALC BMI OUT NRM PARAM NOF/U: HCPCS | Performed by: FAMILY MEDICINE

## 2024-05-10 PROCEDURE — 3046F HEMOGLOBIN A1C LEVEL >9.0%: CPT | Performed by: FAMILY MEDICINE

## 2024-05-10 PROCEDURE — 99213 OFFICE O/P EST LOW 20 MIN: CPT | Performed by: FAMILY MEDICINE

## 2024-05-10 PROCEDURE — 3078F DIAST BP <80 MM HG: CPT | Performed by: FAMILY MEDICINE

## 2024-05-10 PROCEDURE — G8427 DOCREV CUR MEDS BY ELIG CLIN: HCPCS | Performed by: FAMILY MEDICINE

## 2024-05-10 PROCEDURE — 1036F TOBACCO NON-USER: CPT | Performed by: FAMILY MEDICINE

## 2024-05-10 PROCEDURE — 83036 HEMOGLOBIN GLYCOSYLATED A1C: CPT | Performed by: FAMILY MEDICINE

## 2024-05-10 PROCEDURE — 2022F DILAT RTA XM EVC RTNOPTHY: CPT | Performed by: FAMILY MEDICINE

## 2024-05-10 PROCEDURE — 3017F COLORECTAL CA SCREEN DOC REV: CPT | Performed by: FAMILY MEDICINE

## 2024-05-10 NOTE — PROGRESS NOTES
[facility]  Family Medicine      Patient:  Jerome Steel 63 y.o. male  Date of Service: 5/10/24      Chief complaint:   Chief Complaint   Patient presents with    Diabetes         History of Present Illness   Jerome Steel is a 63 y.o. male who presents to the clinic with complaints as above.    Diabetes Mellitus Type 2  Hemoglobin A1C   Date Value Ref Range Status   08/16/2023 5.8 % Final   Last A1C on 2/6/24 was 7.5  POCT A1C today 6.4  Medications include Jardiance 10mg daily, amaryl 2mg daily, metformin 500mg BID, taking as prescribed  Blood sugars at home are improving  Denies symptoms of high or low blood sugars, medication side effects     Current Health Maintenance:  Health Maintenance   Topic Date Due    Pneumococcal 0-64 years Vaccine (1 of 2 - PCV) Never done    HIV screen  Never done    Diabetic Alb to Cr ratio (uACR) test  Never done    DTaP/Tdap/Td vaccine (1 - Tdap) Never done    Colorectal Cancer Screen  Never done    Shingles vaccine (1 of 2) Never done    Low dose CT lung screening &/or counseling  Never done    Lipids  03/01/2017    Respiratory Syncytial Virus (RSV) Pregnant or age 60 yrs+ (1 - 1-dose 60+ series) Never done    Diabetic retinal exam  08/08/2020    COVID-19 Vaccine (2 - 2023-24 season) 09/01/2023    Diabetic foot exam  09/13/2023    GFR test (Diabetes, CKD 3-4, OR last GFR 15-59)  09/26/2023    Flu vaccine (Season Ended) 08/01/2024    A1C test (Diabetic or Prediabetic)  08/16/2024    Depression Screen  02/06/2025    Annual Wellness Visit (Medicare)  02/06/2025    Hepatitis C screen  Completed    Hepatitis A vaccine  Aged Out    Hepatitis B vaccine  Aged Out    Hib vaccine  Aged Out    Polio vaccine  Aged Out    Meningococcal (ACWY) vaccine  Aged Out       Past Medical History:      Diagnosis Date    Arthritis     Bilateral carpal tunnel syndrome 2016    Cerebral artery occlusion with cerebral infarction (HCC)     Chronic back pain     Coronary artery disease

## 2024-06-04 ENCOUNTER — TELEPHONE (OUTPATIENT)
Dept: PRIMARY CARE CLINIC | Age: 64
End: 2024-06-04

## 2024-06-04 DIAGNOSIS — G89.29 CHRONIC BILATERAL LOW BACK PAIN WITHOUT SCIATICA: ICD-10-CM

## 2024-06-04 DIAGNOSIS — M54.50 CHRONIC BILATERAL LOW BACK PAIN WITHOUT SCIATICA: ICD-10-CM

## 2024-06-05 RX ORDER — TRAMADOL HYDROCHLORIDE 50 MG/1
50 TABLET ORAL EVERY 8 HOURS PRN
Qty: 90 TABLET | Refills: 0 | Status: SHIPPED | OUTPATIENT
Start: 2024-06-05 | End: 2024-07-05

## 2024-06-05 NOTE — TELEPHONE ENCOUNTER
Pharmacy stated he was having insurance issues and to just send it over again how it has been and she will see what happens.

## 2024-06-07 RX ORDER — CARVEDILOL 3.12 MG/1
3.12 TABLET ORAL 2 TIMES DAILY WITH MEALS
Qty: 180 TABLET | Refills: 1 | Status: SHIPPED | OUTPATIENT
Start: 2024-06-07

## 2024-06-08 DIAGNOSIS — M54.50 CHRONIC BILATERAL LOW BACK PAIN WITHOUT SCIATICA: ICD-10-CM

## 2024-06-08 DIAGNOSIS — G89.29 CHRONIC BILATERAL LOW BACK PAIN WITHOUT SCIATICA: ICD-10-CM

## 2024-06-15 ENCOUNTER — APPOINTMENT (OUTPATIENT)
Dept: GENERAL RADIOLOGY | Age: 64
End: 2024-06-15
Payer: MEDICARE

## 2024-06-15 ENCOUNTER — HOSPITAL ENCOUNTER (EMERGENCY)
Age: 64
Discharge: ANOTHER HOSPITAL | End: 2024-06-15
Attending: EMERGENCY MEDICINE
Payer: MEDICARE

## 2024-06-15 ENCOUNTER — APPOINTMENT (OUTPATIENT)
Dept: CT IMAGING | Age: 64
End: 2024-06-15
Payer: MEDICARE

## 2024-06-15 ENCOUNTER — HOSPITAL ENCOUNTER (INPATIENT)
Age: 64
LOS: 5 days | Discharge: HOME | End: 2024-06-20
Attending: STUDENT IN AN ORGANIZED HEALTH CARE EDUCATION/TRAINING PROGRAM | Admitting: SURGERY
Payer: MEDICARE

## 2024-06-15 VITALS
TEMPERATURE: 98.3 F | RESPIRATION RATE: 18 BRPM | HEART RATE: 68 BPM | DIASTOLIC BLOOD PRESSURE: 70 MMHG | SYSTOLIC BLOOD PRESSURE: 130 MMHG | OXYGEN SATURATION: 98 %

## 2024-06-15 DIAGNOSIS — S09.90XA CLOSED HEAD INJURY, INITIAL ENCOUNTER: ICD-10-CM

## 2024-06-15 DIAGNOSIS — I62.9 INTRACRANIAL HEMORRHAGE (HCC): Primary | ICD-10-CM

## 2024-06-15 DIAGNOSIS — S51.819A SKIN TEAR OF FOREARM WITHOUT COMPLICATION, INITIAL ENCOUNTER: ICD-10-CM
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
## 2024-06-15 DIAGNOSIS — E87.1 HYPONATREMIA: ICD-10-CM

## 2024-06-15 LAB
ALBUMIN SERPL-MCNC: 3.9 G/DL (ref 3.5–5.2)
ALP SERPL-CCNC: 66 U/L (ref 40–129)
ALT SERPL-CCNC: 23 U/L (ref 0–40)
AMPHET UR QL SCN: NEGATIVE
ANION GAP SERPL CALCULATED.3IONS-SCNC: 16 MMOL/L (ref 7–16)
ANION GAP SERPL CALCULATED.3IONS-SCNC: 19 MMOL/L (ref 7–16)
AST SERPL-CCNC: 29 U/L (ref 0–39)
BARBITURATES UR QL SCN: NEGATIVE
BASOPHILS # BLD: 0.06 K/UL (ref 0–0.2)
BASOPHILS # BLD: 0.07 K/UL (ref 0–0.2)
BASOPHILS NFR BLD: 1 % (ref 0–2)
BASOPHILS NFR BLD: 1 % (ref 0–2)
BENZODIAZ UR QL: NEGATIVE
BILIRUB SERPL-MCNC: 0.7 MG/DL (ref 0–1.2)
BUN SERPL-MCNC: 9 MG/DL (ref 6–23)
BUN SERPL-MCNC: 9 MG/DL (ref 6–23)
BUPRENORPHINE UR QL: NEGATIVE
CALCIUM SERPL-MCNC: 8.4 MG/DL (ref 8.6–10.2)
CALCIUM SERPL-MCNC: 8.9 MG/DL (ref 8.6–10.2)
CANNABINOIDS UR QL SCN: NEGATIVE
CHLORIDE SERPL-SCNC: 90 MMOL/L (ref 98–107)
CHLORIDE SERPL-SCNC: 94 MMOL/L (ref 98–107)
CLOT ANGLE.KAOLIN INDUCED BLD RES TEG: 67 DEG (ref 53–70)
CO2 SERPL-SCNC: 16 MMOL/L (ref 22–29)
CO2 SERPL-SCNC: 19 MMOL/L (ref 22–29)
COCAINE UR QL SCN: NEGATIVE
CREAT SERPL-MCNC: 0.8 MG/DL (ref 0.7–1.2)
CREAT SERPL-MCNC: 0.9 MG/DL (ref 0.7–1.2)
EOSINOPHIL # BLD: 0.19 K/UL (ref 0.05–0.5)
EOSINOPHIL # BLD: 0.29 K/UL (ref 0.05–0.5)
EOSINOPHILS RELATIVE PERCENT: 2 % (ref 0–6)
EOSINOPHILS RELATIVE PERCENT: 3 % (ref 0–6)
EPL-TEG: 0 % (ref 0–15)
ERYTHROCYTE [DISTWIDTH] IN BLOOD BY AUTOMATED COUNT: 13.6 % (ref 11.5–15)
ERYTHROCYTE [DISTWIDTH] IN BLOOD BY AUTOMATED COUNT: 13.8 % (ref 11.5–15)
ETHANOLAMINE SERPL-MCNC: 48 MG/DL (ref 0–0.08)
FENTANYL UR QL: NEGATIVE
G-TEG: 8.5 KDYN/CM2 (ref 4.5–11)
GFR, ESTIMATED: >90 ML/MIN/1.73M2
GFR, ESTIMATED: >90 ML/MIN/1.73M2
GLUCOSE BLD-MCNC: 118 MG/DL (ref 74–99)
GLUCOSE BLD-MCNC: 124 MG/DL (ref 74–99)
GLUCOSE BLD-MCNC: 129 MG/DL (ref 74–99)
GLUCOSE BLD-MCNC: 134 MG/DL (ref 74–99)
GLUCOSE SERPL-MCNC: 118 MG/DL (ref 74–99)
GLUCOSE SERPL-MCNC: 125 MG/DL (ref 74–99)
HCT VFR BLD AUTO: 31.9 % (ref 37–54)
HCT VFR BLD AUTO: 34.1 % (ref 37–54)
HGB BLD-MCNC: 10.5 G/DL (ref 12.5–16.5)
HGB BLD-MCNC: 11.7 G/DL (ref 12.5–16.5)
IMM GRANULOCYTES # BLD AUTO: 0.07 K/UL (ref 0–0.58)
IMM GRANULOCYTES # BLD AUTO: 0.08 K/UL (ref 0–0.58)
IMM GRANULOCYTES NFR BLD: 1 % (ref 0–5)
IMM GRANULOCYTES NFR BLD: 1 % (ref 0–5)
INR PPP: 1.1
KINETICS TEG: 1.8 MIN (ref 1–3)
LY30 (LYSIS) TEG: 0 % (ref 0–8)
LYMPHOCYTES NFR BLD: 1.1 K/UL (ref 1.5–4)
LYMPHOCYTES NFR BLD: 1.45 K/UL (ref 1.5–4)
LYMPHOCYTES RELATIVE PERCENT: 12 % (ref 20–42)
LYMPHOCYTES RELATIVE PERCENT: 14 % (ref 20–42)
MA (MAX CLOT) TEG: 63.1 MM (ref 50–70)
MCH RBC QN AUTO: 31.2 PG (ref 26–35)
MCH RBC QN AUTO: 32.1 PG (ref 26–35)
MCHC RBC AUTO-ENTMCNC: 32.9 G/DL (ref 32–34.5)
MCHC RBC AUTO-ENTMCNC: 34.3 G/DL (ref 32–34.5)
MCV RBC AUTO: 93.4 FL (ref 80–99.9)
MCV RBC AUTO: 94.7 FL (ref 80–99.9)
METHADONE UR QL: NEGATIVE
MONOCYTES NFR BLD: 0.72 K/UL (ref 0.1–0.95)
MONOCYTES NFR BLD: 0.82 K/UL (ref 0.1–0.95)
MONOCYTES NFR BLD: 7 % (ref 2–12)
MONOCYTES NFR BLD: 9 % (ref 2–12)
NEUTROPHILS NFR BLD: 75 % (ref 43–80)
NEUTROPHILS NFR BLD: 76 % (ref 43–80)
NEUTS SEG NFR BLD: 7.28 K/UL (ref 1.8–7.3)
NEUTS SEG NFR BLD: 7.62 K/UL (ref 1.8–7.3)
OPIATES UR QL SCN: NEGATIVE
OXYCODONE UR QL SCN: NEGATIVE
PARTIAL THROMBOPLASTIN TIME: 32.1 SEC (ref 24.5–35.1)
PCP UR QL SCN: NEGATIVE
PLATELET # BLD AUTO: 176 K/UL (ref 130–450)
PLATELET # BLD AUTO: 185 K/UL (ref 130–450)
PMV BLD AUTO: 9.4 FL (ref 7–12)
PMV BLD AUTO: 9.5 FL (ref 7–12)
POTASSIUM SERPL-SCNC: 4.1 MMOL/L (ref 3.5–5)
POTASSIUM SERPL-SCNC: 4.6 MMOL/L (ref 3.5–5)
PROT SERPL-MCNC: 7.1 G/DL (ref 6.4–8.3)
PROTHROMBIN TIME: 12.5 SEC (ref 9.3–12.4)
RBC # BLD AUTO: 3.37 M/UL (ref 3.8–5.8)
RBC # BLD AUTO: 3.65 M/UL (ref 3.8–5.8)
REACTION TIME TEG: 5.2 MIN (ref 5–10)
SODIUM SERPL-SCNC: 125 MMOL/L (ref 132–146)
SODIUM SERPL-SCNC: 129 MMOL/L (ref 132–146)
TEST INFORMATION: NORMAL
WBC OTHER # BLD: 10.2 K/UL (ref 4.5–11.5)
WBC OTHER # BLD: 9.5 K/UL (ref 4.5–11.5)

## 2024-06-15 PROCEDURE — 2140000000 HC CCU INTERMEDIATE R&B

## 2024-06-15 PROCEDURE — 85025 COMPLETE CBC W/AUTO DIFF WBC: CPT

## 2024-06-15 PROCEDURE — 99285 EMERGENCY DEPT VISIT HI MDM: CPT

## 2024-06-15 PROCEDURE — 85730 THROMBOPLASTIN TIME PARTIAL: CPT

## 2024-06-15 PROCEDURE — 82962 GLUCOSE BLOOD TEST: CPT

## 2024-06-15 PROCEDURE — 70450 CT HEAD/BRAIN W/O DYE: CPT

## 2024-06-15 PROCEDURE — 85347 COAGULATION TIME ACTIVATED: CPT

## 2024-06-15 PROCEDURE — 99222 1ST HOSP IP/OBS MODERATE 55: CPT | Performed by: NEUROLOGICAL SURGERY

## 2024-06-15 PROCEDURE — 6360000002 HC RX W HCPCS: Performed by: STUDENT IN AN ORGANIZED HEALTH CARE EDUCATION/TRAINING PROGRAM

## 2024-06-15 PROCEDURE — 72125 CT NECK SPINE W/O DYE: CPT

## 2024-06-15 PROCEDURE — 96374 THER/PROPH/DIAG INJ IV PUSH: CPT

## 2024-06-15 PROCEDURE — 71045 X-RAY EXAM CHEST 1 VIEW: CPT

## 2024-06-15 PROCEDURE — 80048 BASIC METABOLIC PNL TOTAL CA: CPT

## 2024-06-15 PROCEDURE — 6370000000 HC RX 637 (ALT 250 FOR IP)

## 2024-06-15 PROCEDURE — 73090 X-RAY EXAM OF FOREARM: CPT

## 2024-06-15 PROCEDURE — 72170 X-RAY EXAM OF PELVIS: CPT

## 2024-06-15 PROCEDURE — 2580000003 HC RX 258

## 2024-06-15 PROCEDURE — 2580000003 HC RX 258: Performed by: STUDENT IN AN ORGANIZED HEALTH CARE EDUCATION/TRAINING PROGRAM

## 2024-06-15 PROCEDURE — G0480 DRUG TEST DEF 1-7 CLASSES: HCPCS

## 2024-06-15 PROCEDURE — 85576 BLOOD PLATELET AGGREGATION: CPT

## 2024-06-15 PROCEDURE — 85384 FIBRINOGEN ACTIVITY: CPT

## 2024-06-15 PROCEDURE — 80053 COMPREHEN METABOLIC PANEL: CPT

## 2024-06-15 PROCEDURE — 85390 FIBRINOLYSINS SCREEN I&R: CPT

## 2024-06-15 PROCEDURE — 90715 TDAP VACCINE 7 YRS/> IM: CPT

## 2024-06-15 PROCEDURE — 90471 IMMUNIZATION ADMIN: CPT

## 2024-06-15 PROCEDURE — 85610 PROTHROMBIN TIME: CPT

## 2024-06-15 PROCEDURE — 6360000002 HC RX W HCPCS

## 2024-06-15 PROCEDURE — 80307 DRUG TEST PRSMV CHEM ANLYZR: CPT

## 2024-06-15 RX ORDER — CARVEDILOL 3.12 MG/1
3.12 TABLET ORAL 2 TIMES DAILY WITH MEALS
Status: DISCONTINUED | OUTPATIENT
Start: 2024-06-15 | End: 2024-06-20 | Stop reason: HOSPADM

## 2024-06-15 RX ORDER — HYDRALAZINE HYDROCHLORIDE 20 MG/ML
10 INJECTION INTRAMUSCULAR; INTRAVENOUS EVERY 30 MIN PRN
Status: DISCONTINUED | OUTPATIENT
Start: 2024-06-15 | End: 2024-06-20 | Stop reason: HOSPADM

## 2024-06-15 RX ORDER — POLYETHYLENE GLYCOL 3350 17 G/17G
17 POWDER, FOR SOLUTION ORAL DAILY
Status: DISCONTINUED | OUTPATIENT
Start: 2024-06-15 | End: 2024-06-20 | Stop reason: HOSPADM

## 2024-06-15 RX ORDER — SODIUM CHLORIDE 0.9 % (FLUSH) 0.9 %
10 SYRINGE (ML) INJECTION EVERY 12 HOURS SCHEDULED
Status: DISCONTINUED | OUTPATIENT
Start: 2024-06-15 | End: 2024-06-20 | Stop reason: HOSPADM

## 2024-06-15 RX ORDER — SENNA AND DOCUSATE SODIUM 50; 8.6 MG/1; MG/1
1 TABLET, FILM COATED ORAL 2 TIMES DAILY
Status: DISCONTINUED | OUTPATIENT
Start: 2024-06-15 | End: 2024-06-20 | Stop reason: HOSPADM

## 2024-06-15 RX ORDER — ONDANSETRON 4 MG/1
4 TABLET, ORALLY DISINTEGRATING ORAL EVERY 8 HOURS PRN
Status: DISCONTINUED | OUTPATIENT
Start: 2024-06-15 | End: 2024-06-20 | Stop reason: HOSPADM

## 2024-06-15 RX ORDER — ONDANSETRON 2 MG/ML
4 INJECTION INTRAMUSCULAR; INTRAVENOUS EVERY 6 HOURS PRN
Status: DISCONTINUED | OUTPATIENT
Start: 2024-06-15 | End: 2024-06-20 | Stop reason: HOSPADM

## 2024-06-15 RX ORDER — PANTOPRAZOLE SODIUM 40 MG/1
40 TABLET, DELAYED RELEASE ORAL DAILY
Status: DISCONTINUED | OUTPATIENT
Start: 2024-06-15 | End: 2024-06-20 | Stop reason: HOSPADM

## 2024-06-15 RX ORDER — MECOBALAMIN 5000 MCG
5 TABLET,DISINTEGRATING ORAL NIGHTLY
Status: DISCONTINUED | OUTPATIENT
Start: 2024-06-15 | End: 2024-06-20 | Stop reason: HOSPADM

## 2024-06-15 RX ORDER — BACITRACIN ZINC 500 [USP'U]/G
OINTMENT TOPICAL 3 TIMES DAILY
Status: DISCONTINUED | OUTPATIENT
Start: 2024-06-15 | End: 2024-06-20 | Stop reason: HOSPADM

## 2024-06-15 RX ORDER — TRAMADOL HYDROCHLORIDE 50 MG/1
50 TABLET ORAL EVERY 8 HOURS PRN
Status: DISCONTINUED | OUTPATIENT
Start: 2024-06-15 | End: 2024-06-20 | Stop reason: HOSPADM

## 2024-06-15 RX ORDER — SODIUM CHLORIDE 0.9 % (FLUSH) 0.9 %
10 SYRINGE (ML) INJECTION PRN
Status: DISCONTINUED | OUTPATIENT
Start: 2024-06-15 | End: 2024-06-20 | Stop reason: HOSPADM

## 2024-06-15 RX ORDER — SODIUM CHLORIDE 9 MG/ML
INJECTION, SOLUTION INTRAVENOUS PRN
Status: DISCONTINUED | OUTPATIENT
Start: 2024-06-15 | End: 2024-06-20 | Stop reason: HOSPADM

## 2024-06-15 RX ORDER — FOLIC ACID 1 MG/1
1 TABLET ORAL DAILY
Status: DISCONTINUED | OUTPATIENT
Start: 2024-06-15 | End: 2024-06-20 | Stop reason: HOSPADM

## 2024-06-15 RX ORDER — LEVETIRACETAM 500 MG/5ML
1500 INJECTION, SOLUTION, CONCENTRATE INTRAVENOUS ONCE
Status: COMPLETED | OUTPATIENT
Start: 2024-06-15 | End: 2024-06-15

## 2024-06-15 RX ORDER — PHENOBARBITAL 32.4 MG/1
64.8 TABLET ORAL EVERY 6 HOURS
Status: DISCONTINUED | OUTPATIENT
Start: 2024-06-15 | End: 2024-06-20 | Stop reason: HOSPADM

## 2024-06-15 RX ORDER — INSULIN LISPRO 100 [IU]/ML
0-16 INJECTION, SOLUTION INTRAVENOUS; SUBCUTANEOUS EVERY 4 HOURS
Status: DISCONTINUED | OUTPATIENT
Start: 2024-06-15 | End: 2024-06-16

## 2024-06-15 RX ORDER — CYCLOBENZAPRINE HCL 10 MG
5 TABLET ORAL NIGHTLY PRN
Status: DISCONTINUED | OUTPATIENT
Start: 2024-06-15 | End: 2024-06-20 | Stop reason: HOSPADM

## 2024-06-15 RX ORDER — SODIUM CHLORIDE 9 MG/ML
INJECTION, SOLUTION INTRAVENOUS CONTINUOUS
Status: DISCONTINUED | OUTPATIENT
Start: 2024-06-15 | End: 2024-06-16

## 2024-06-15 RX ORDER — MULTIVITAMIN WITH IRON
1 TABLET ORAL DAILY
Status: DISCONTINUED | OUTPATIENT
Start: 2024-06-15 | End: 2024-06-20 | Stop reason: HOSPADM

## 2024-06-15 RX ORDER — BACITRACIN ZINC 500 [USP'U]/G
OINTMENT TOPICAL ONCE
Status: COMPLETED | OUTPATIENT
Start: 2024-06-15 | End: 2024-06-15

## 2024-06-15 RX ORDER — LABETALOL HYDROCHLORIDE 5 MG/ML
10 INJECTION, SOLUTION INTRAVENOUS EVERY 30 MIN PRN
Status: DISCONTINUED | OUTPATIENT
Start: 2024-06-15 | End: 2024-06-20 | Stop reason: HOSPADM

## 2024-06-15 RX ORDER — LEVETIRACETAM 500 MG/1
500 TABLET ORAL 2 TIMES DAILY
Status: DISCONTINUED | OUTPATIENT
Start: 2024-06-15 | End: 2024-06-20 | Stop reason: HOSPADM

## 2024-06-15 RX ORDER — INSULIN LISPRO 100 [IU]/ML
0-4 INJECTION, SOLUTION INTRAVENOUS; SUBCUTANEOUS NIGHTLY
Status: DISCONTINUED | OUTPATIENT
Start: 2024-06-15 | End: 2024-06-16

## 2024-06-15 RX ORDER — LEVETIRACETAM 500 MG/5ML
500 INJECTION, SOLUTION, CONCENTRATE INTRAVENOUS EVERY 12 HOURS
Status: DISCONTINUED | OUTPATIENT
Start: 2024-06-15 | End: 2024-06-15

## 2024-06-15 RX ORDER — LANOLIN ALCOHOL/MO/W.PET/CERES
100 CREAM (GRAM) TOPICAL DAILY
Status: DISCONTINUED | OUTPATIENT
Start: 2024-06-15 | End: 2024-06-20 | Stop reason: HOSPADM

## 2024-06-15 RX ORDER — GABAPENTIN 300 MG/1
300 CAPSULE ORAL 2 TIMES DAILY
Status: DISCONTINUED | OUTPATIENT
Start: 2024-06-15 | End: 2024-06-20 | Stop reason: HOSPADM

## 2024-06-15 RX ORDER — ACETAMINOPHEN 325 MG/1
650 TABLET ORAL EVERY 6 HOURS PRN
Status: DISCONTINUED | OUTPATIENT
Start: 2024-06-15 | End: 2024-06-20 | Stop reason: HOSPADM

## 2024-06-15 RX ADMIN — LEVETIRACETAM 500 MG: 100 INJECTION INTRAVENOUS at 05:36

## 2024-06-15 RX ADMIN — CARVEDILOL 3.12 MG: 3.12 TABLET, FILM COATED ORAL at 09:03

## 2024-06-15 RX ADMIN — BACITRACIN ZINC: 500 OINTMENT TOPICAL at 00:43

## 2024-06-15 RX ADMIN — BACITRACIN ZINC: 500 OINTMENT TOPICAL at 22:40

## 2024-06-15 RX ADMIN — SENNOSIDES AND DOCUSATE SODIUM 1 TABLET: 50; 8.6 TABLET ORAL at 09:03

## 2024-06-15 RX ADMIN — PHENOBARBITAL 64.8 MG: 32.4 TABLET ORAL at 18:50

## 2024-06-15 RX ADMIN — PANTOPRAZOLE SODIUM 40 MG: 40 TABLET, DELAYED RELEASE ORAL at 09:07

## 2024-06-15 RX ADMIN — LEVETIRACETAM 500 MG: 500 TABLET, FILM COATED ORAL at 21:03

## 2024-06-15 RX ADMIN — CARVEDILOL 3.12 MG: 3.12 TABLET, FILM COATED ORAL at 18:50

## 2024-06-15 RX ADMIN — SODIUM CHLORIDE, PRESERVATIVE FREE 10 ML: 5 INJECTION INTRAVENOUS at 21:03

## 2024-06-15 RX ADMIN — PHENOBARBITAL 64.8 MG: 32.4 TABLET ORAL at 13:48

## 2024-06-15 RX ADMIN — BACITRACIN ZINC: 500 OINTMENT TOPICAL at 09:03

## 2024-06-15 RX ADMIN — Medication 5 MG: at 21:03

## 2024-06-15 RX ADMIN — TRAMADOL HYDROCHLORIDE 50 MG: 50 TABLET ORAL at 17:10

## 2024-06-15 RX ADMIN — POLYETHYLENE GLYCOL 3350 17 G: 17 POWDER, FOR SOLUTION ORAL at 09:03

## 2024-06-15 RX ADMIN — LEVETIRACETAM 1500 MG: 100 INJECTION, SOLUTION INTRAVENOUS at 03:40

## 2024-06-15 RX ADMIN — SODIUM CHLORIDE: 9 INJECTION, SOLUTION INTRAVENOUS at 05:39

## 2024-06-15 RX ADMIN — GABAPENTIN 300 MG: 300 CAPSULE ORAL at 21:03

## 2024-06-15 RX ADMIN — PHENOBARBITAL 64.8 MG: 32.4 TABLET ORAL at 06:11

## 2024-06-15 RX ADMIN — TRAMADOL HYDROCHLORIDE 50 MG: 50 TABLET ORAL at 09:06

## 2024-06-15 RX ADMIN — GABAPENTIN 300 MG: 300 CAPSULE ORAL at 09:04

## 2024-06-15 RX ADMIN — TETANUS TOXOID, REDUCED DIPHTHERIA TOXOID AND ACELLULAR PERTUSSIS VACCINE, ADSORBED 0.5 ML: 5; 2.5; 8; 8; 2.5 SUSPENSION INTRAMUSCULAR at 00:53

## 2024-06-15 RX ADMIN — Medication 100 MG: at 09:04

## 2024-06-15 RX ADMIN — FOLIC ACID 1 MG: 1 TABLET ORAL at 09:08

## 2024-06-15 RX ADMIN — MULTIVITAMIN TABLET 1 TABLET: TABLET at 09:03

## 2024-06-15 ASSESSMENT — PAIN SCALES - GENERAL
PAINLEVEL_OUTOF10: 9
PAINLEVEL_OUTOF10: 8
PAINLEVEL_OUTOF10: 9

## 2024-06-15 ASSESSMENT — PAIN DESCRIPTION - DESCRIPTORS
DESCRIPTORS: ACHING
DESCRIPTORS: DULL;ACHING

## 2024-06-15 ASSESSMENT — PAIN DESCRIPTION - LOCATION
LOCATION: BACK;HIP;SHOULDER
LOCATION: HEAD
LOCATION: BACK

## 2024-06-15 ASSESSMENT — PAIN - FUNCTIONAL ASSESSMENT: PAIN_FUNCTIONAL_ASSESSMENT: NONE - DENIES PAIN

## 2024-06-15 ASSESSMENT — PAIN DESCRIPTION - ORIENTATION: ORIENTATION: LEFT

## 2024-06-15 NOTE — PROGRESS NOTES
4 Eyes Skin Assessment     NAME:  Jerome Steel  YOB: 1960  MEDICAL RECORD NUMBER:  [id number]    The patient is being assessed for  Admission    I agree that at least one RN has performed a thorough Head to Toe Skin Assessment on the patient. ALL assessment sites listed below have been assessed.      Areas assessed by both nurses:    Head, Face, Ears, Shoulders, Back, Chest, Arms, Elbows, Hands, Sacrum. Buttock, Coccyx, Ischium, Legs. Feet and Heels, and Under Medical Devices         Does the Patient have a Wound? No noted wound(s)       Maik Prevention initiated by RN: Yes  Wound Care Orders initiated by RN: No    Pressure Injury (Stage 3,4, Unstageable, DTI, NWPT, and Complex wounds) if present, place Wound referral order by RN under : No    New Ostomies, if present place, Ostomy referral order under : No     Nurse 1 eSignature: Electronically signed by Melissa Vieira RN on 6/15/24 at 6:59 PM EDT    **SHARE this note so that the co-signing nurse can place an eSignature**    Nurse 2 eSignature: Electronically signed by Amara Jimenez RN on 6/15/24 at 7:00 PM EDT

## 2024-06-15 NOTE — ED PROVIDER NOTES
[facility] EMERGENCY DEPARTMENT  EMERGENCY DEPARTMENT ENCOUNTER        Pt Name: Jerome Steel  MRN: [id number]  Birthdate 1960  Date of evaluation: 6/15/2024  Provider: Sarmad Alexis DO  PCP: Genet Kraus DO  Note Started: 12:49 AM EDT 6/15/24    CHIEF COMPLAINT       Chief Complaint   Patient presents with    Fall     Pt state he fell down 3 steps, and has a laceration on his right forearm        HISTORY OF PRESENT ILLNESS: 1 or more Elements   Jerome Steel is a 63 y.o. male who presents to the emergency department with chief complaint of fall.  Patient states this happened while he was walking down the stairs and he lost his balance 3 steps from the bottom and fell.  He states that he did cut his arm on a wooden block, wall and sustained a laceration and he is not sure if he hit his head.  He states he has baseline weakness of left side from previous stroke and states that he did have to call 911 for assistance to get up.  He states that he was on the floor for very long.  He states that he does not think that he lost consciousness.  He does not believe he takes any blood thinners.  Patient denies any other symptoms and has no neck pain.  Patient denies fever, chills, nausea, vomiting, chest pain, shortness of breath, abdominal pain, hematuria or dysuria, constipation or diarrhea.    Nursing Notes were all reviewed and agreed with or any disagreements were addressed in the HPI.    REVIEW OF SYSTEMS :      Positives and Pertinent negatives as per HPI.     PAST MEDICAL HISTORY/Chronic Conditions Affecting Care      has a past medical history of Arthritis, Bilateral carpal tunnel syndrome (2016), Cerebral artery occlusion with cerebral infarction ([facility]), Chronic back pain, Coronary artery disease involving native coronary artery of native heart without angina pectoris (09/16/2022), CVA (cerebral vascular accident) ([facility]) (11/2015, 2017), Diabetes mellitus ([facility]), Diabetic  spine CT without fracture or subluxation.  CT head critical result called demonstrating multiple parenchymal petechial hemorrhages of the right lateral frontal lobes measuring up to 6 mm with small subarachnoid hemorrhage-no midline shift appreciated.  At this time lab studies are drawn and called and spoke with trauma center physician who accepts patient for transfer.  CMP with mild hyponatremia of 125 and no acute electrolyte derangements otherwise, kidney injury, LFT changes.  CBC with no leukocytosis or anemia and coagulation panels in normal limits.    Extensively reviewed imaging findings and care plan with patient/family members/available representative parties and all questions answered at this time to the fullest extent possible.  Patient has remained clinically and hemodynamically stable.  Patient is given antiepileptic medication and spoke with accepting emergency department physician at [facility] who accepts patient for transfer for trauma evaluation due to acute intracranial hemorrhage.  Patient informed of this plan and he is understanding and agreeable.    Differential diagnosis includes but is not limited to: Acute intracranial hemorrhage, cervical spine fracture, forearm fracture, infection    Please refer to the ED Course as available for additional MDM.  ED Course as of 06/15/24 0621   Sat Abdi 15, 2024   0251 Dr. Hua accepts pt for transfer [RW]      ED Course User Index  [RW] Sarmad Alexis,         Social Determinants affecting Dx or Tx: Patient has good medical compliance and fluency as well as established follow-up with family physician.    Records Reviewed: On attempted record review: No significant external or nonemergency department records available at this time.    I am the Primary Clinician of Record.    CONSULTS: (Who and What was discussed)  None    FINAL IMPRESSION      1. Intracranial hemorrhage (HCC)    2. Fall, initial encounter    3. Skin tear of forearm

## 2024-06-15 NOTE — ED NOTES
Called Deja Stone MD about clarifications about BP parameters due to patients diagnosis. Message left as physician did not answer the phone at this time.

## 2024-06-15 NOTE — DISCHARGE INSTRUCTIONS
TR[facility] DISCHARGE INSTRUCTIONS    Call [phone], option 2, for any questions/concerns and for follow-up appointment in 1 week(s).    Please follow the instructions checked below:    Please follow-up with your primary care provider.    ACTIVITY INSTRUCTIONS  Increase activity as tolerated  No heavy lifting or strenuous activity  Take your incentive spirometer home and use 4-6 times/day   [x]  No driving until cleared by Neurosurgery    WOUND/DRESSING INSTRUCTIONS:  You may shower.  No sitting in bath tub, hot tub or swimming until cleared by physician.  Ice to areas of pain for first 24 hours.  Heat to areas of pain after that.  Wash areas of lacerations/abrasions with soap & water.  Rinse well.  Pat dry with clean towel.  Apply thin layer of Bacitracin, Neosporin, or triple antibiotic cream to affected area 2-3 times per day.  Keep wounds clean and dry.      MEDICATION INSTRUCTIONS  Take medication as prescribed.  When taking pain medications, you may experience dizziness or drowsiness.  Do not drink alcohol or drive when taking these medications.  You may experience constipation while taking pain medication.  You may take over the counter stool softeners such as docusate (Colace), sennosides S (Senokot-S), or Miralax.   []  You may take Ibuprofen (over the counter) as directed for mild pain.     --You may take up to 800mg every 8 hours for pain, please take with food or milk.   [x]  You may take acetaminophen (Tylenol) products.  Do NOT take more than 4000mg of Tylenol in 24h.   []  Do not take any other acetaminophen (Tylenol) products if you are taking Percocet or Norco, as these contain Tylenol.   --Do NOT take more than 4000mg of Tylenol in 24h.    OPIOID MEDICATION INSTRUCTIONS  Read the medication guide that is included with your prescription.  Take your medication exactly as prescribed.  Store medication away from children and in a safe place.  Do NOT share your medication with others.  Do NOT

## 2024-06-15 NOTE — ED NOTES
sOcar Jay, DO     Oscar Kaufman has been reached out for BP management. Clarification with Dr. Jackson about BP parameters states that \"Anything under 160's is fine,\"

## 2024-06-15 NOTE — H&P
HTN, HLD    Medications: ASA/plavix, coreg, lipitor, jardiance, metformin, ultram for chronic back pain    Allergies: PCN    Social History:   Tobacco use:  none  Alcohol use:  > 5 beers per day(s)  Illicit drug use:  no history of illicit drug use    Past Surgical History:  finger amp    Anticoagulant use: None  Antiplatelet use:   ASA and Plavix    NSAID use in last 72 hours: yes  Taken PCN in past:  yes  Last food/drink: yesterday at noon  Last tetanus: at [facility]     Family History:   No family history of anesthesia complications    Complaints:   Head:  None  Neck:   None  Chest:   None  Back:   None  Abdomen:   None  Extremities:   Mild  Comments: mild pain to left hio    Review of systems:  All negative unless otherwise noted.        SECONDARY SURVEY  Head/scalp: Atraumatic       Face: Atraumatic    Eyes/ears/nose: Atraumatic      Pharynx/mouth: Atraumatic      Neck:  Atraumatic      Cervical spine tenderness:  Cervical collar not indicated  Pain:  none  ROM:  Not indicated     Chest wall:   Atraumatic       Heart:   Regular rate & rhythm    Abdomen:  Atraumatic.  Soft ND  Tenderness:  none    Pelvis: Atraumatic      Tenderness: none    Thoracolumbar spine: Atraumatic     Tenderness:  none    Genitourinary:  Atraumatic.  No blood or urine noted    Rectum: Atraumatic.  No blood noted.      Perineum: Atraumatic.  No blood or urine noted.      Extremities: 3 cm skin tear on dorsal surface of right forearm  Sensory normal  Motor normal    Distal Pulses  Left arm normal  Right arm normal  Left leg normal  Right leg normal    Capillary refill  Left arm normal  Right arm normal  Left leg normal  Right leg normal    Procedures in ED:        In the event of Emergency Blood Transfusion:  Due to the critical condition of this patient, I request the immediate release of blood products for emergency transfusion secondary to shock. I understand the increased risks incurred by the lack of complete transfusion testing.       Radiology: Chest Xray , Pelvic Xray , CT Head , and CT Cervical spine      Consultations: NSGY    Admission/Diagnosis: s/p mechanical fall with SAH      Plan of Treatment:   - hold anticoagulation   - admit to monitored floor    - monitor neuro exam   - repeat CT head   - NPO until repeat head read as stable   - restart home medications as appropriate   - CXR, PXR   - NSGY consulted, appreciate recommendations   - PNB for 6 beers/day   - local wound care to skin tear    Plan discussed with Dr. KYLIE Dominique at 6/15/2024 on 5:44 AM    Electronically signed by Nina Zepeda DO on 6/15/2024 at 5:44 AM     Attending Attestation   I saw and examined the patient, I agree with resident note      Hx taken from patient and chart    I personally reviewed the CT imaging, acute SAH,  small intraparenchymal hemorrhage L frontal lobe    62yo male with hx stroke with baseline L sided weakness s/p fall with acute ICH     Decision to admit him  Neurosurgery consult.     I am managing prescription drugs, Francois parker MD FACS

## 2024-06-15 NOTE — CONSULTS
[facility]              [address] [place] [address]                              CONSULTATION      PATIENT NAME: LUCIO ORELLANA             : 1960  MED REC NO: [id number]                        ROOM: 19  ACCOUNT NO: [id number]                       ADMIT DATE: 06/15/2024  PROVIDER: Deja Jackson MD    NEUROSURGERY CONSULT    CONSULT DATE:  06/15/2024      REASON FOR CONSULT:  Intracranial hemorrhage.    HISTORY OF PRESENT ILLNESS:  The patient is a 63-year-old gentleman who states that he had surgery in his left Achilles tendon with instability in his left lower extremity since then.  He apparently fell and hit his head.  When he came to, he was complaining of a headache.  Denied any new numbness, tingling, weakness, or loss of control of bowel or bladder function.  He was ultimately brought to [facility] for further evaluation and management where CT scan showed intracranial hemorrhage.    PAST MEDICAL HISTORY:  Positive for arthritis, carpal tunnel, stroke, chronic back pain, coronary artery disease, diabetes, hyperlipidemia, hypertension.    PAST SURGICAL HISTORY:  Positive for carpal tunnel release, finger amputation, tonsillectomy, left Achilles surgery.    FAMILY HISTORY:  Positive for heart disease in his father.    SOCIAL HISTORY:  Positive for consumption of alcoholic beverages.  He is an ex-smoker.    ALLERGIES:  INCLUDES PENICILLIN.      REVIEW OF SYSTEMS:  HEENT:  Positive for headache.  Negative for double vision or blurred vision.  CARDIOVASCULAR:  Negative for any chest pain, arrhythmia, or palpitations.  RESPIRATORY:  Negative for shortness of breath, asthma, bronchitis, or pneumonia.  GASTROINTESTINAL:  Negative for heartburn, nausea, vomiting, diarrhea, or constipation.  GENITOURINARY:  Negative for hematuria or dysuria.  HEMATOLOGIC:  Positive for easy bruising.  INFECTIOUS:  Negative for any recent

## 2024-06-15 NOTE — ED PROVIDER NOTES
Department of Emergency Medicine   ED  Provider Note  Admit Date/RoomTime: 6/15/2024  4:29 AM  ED Room: 6411/6411-B          History of Present Illness:    6/15/24, Time: 4:36 AM EDT         Jerome Steel is a 63 y.o. male presenting to the ED for complaint of a fall in which he fell down several steps.  Was a transfer some [facility] emergency department.  Was noted to have intracranial hemorrhage there at that time was sent to us for trauma evaluation.  Patient otherwise denies any chest pain shortness of breath or abdominal pain states he was drinking alcohol and states he does have bad balance because he had surgery on his left leg in which they took out his Achilles heel and is bedbound due to that.  He did fall down multiple steps.  He otherwise denies any other acute complaints.  Review of Systems:   Pertinent positives and negatives are stated within HPI, all other systems reviewed and are negative.        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    Past Surgical History:  has a past surgical history that includes Knee arthroscopy;  (12/21/2021), and Vitamin D deficiency.     Records Reviewed( Source) note from family medicine seen at that time for diabetes on 5/10/2024    CC/HPI Summary, DDx, ED Course, and Reassessment:   Differential diagnosis including but not limited to intracranial hemorrhage, cervical fracture  [facility] in which he hit his head and does have intracranial hemorrhage noted.  On my examination he is GCS of 15.  He has no significant complaints at this time.  He was noted outlying facility to have elevated ethanol level and does admit to drinking.  Does have a slight hyponatremia which is most likely secondary alcohol abuse.  His main laboratory workup was otherwise reassuring.  Trauma consult was placed here in the emergency department.  They did come down to evaluate the patient.  Disposition per their recommendations at this time    Disposition Considerations (Tests not ordered but considered, Shared Decision Making, Pt Expectation of Test or Tx.): Disposition per trauma surgery      Telemetry    The cardiac monitor revealed sinus as interpreted by me. The cardiac monitor was ordered secondary to the patient’s intracranial hemorrhage and to monitor the patient for dysrhythmia    Critical Care    Please note that the withdrawal or failure to initiate urgent interventions for this patient would likely result in a life threatening deterioration or permanent disability.      Accordingly this patient received 32 minutes of critical care time, excluding separately billable procedures.      Medications   sodium chloride flush 0.9 % injection 10 mL (10 mLs IntraVENous Given 6/16/24 0839)   sodium chloride flush 0.9 % injection 10 mL (has no administration in time range)   0.9 % sodium chloride infusion (has no administration in time range)   ondansetron (ZOFRAN-ODT) disintegrating tablet 4 mg (has no administration in time range)     Or   ondansetron

## 2024-06-16 LAB
ANION GAP SERPL CALCULATED.3IONS-SCNC: 15 MMOL/L (ref 7–16)
ANION GAP SERPL CALCULATED.3IONS-SCNC: 16 MMOL/L (ref 7–16)
BASOPHILS # BLD: 0.04 K/UL (ref 0–0.2)
BASOPHILS NFR BLD: 1 % (ref 0–2)
BUN SERPL-MCNC: 10 MG/DL (ref 6–23)
BUN SERPL-MCNC: 11 MG/DL (ref 6–23)
CALCIUM SERPL-MCNC: 8.3 MG/DL (ref 8.6–10.2)
CALCIUM SERPL-MCNC: 8.5 MG/DL (ref 8.6–10.2)
CHLORIDE SERPL-SCNC: 96 MMOL/L (ref 98–107)
CHLORIDE SERPL-SCNC: 97 MMOL/L (ref 98–107)
CO2 SERPL-SCNC: 18 MMOL/L (ref 22–29)
CO2 SERPL-SCNC: 18 MMOL/L (ref 22–29)
CREAT SERPL-MCNC: 0.7 MG/DL (ref 0.7–1.2)
CREAT SERPL-MCNC: 0.7 MG/DL (ref 0.7–1.2)
EOSINOPHIL # BLD: 0.08 K/UL (ref 0.05–0.5)
EOSINOPHILS RELATIVE PERCENT: 2 % (ref 0–6)
ERYTHROCYTE [DISTWIDTH] IN BLOOD BY AUTOMATED COUNT: 14.2 % (ref 11.5–15)
GFR, ESTIMATED: >90 ML/MIN/1.73M2
GFR, ESTIMATED: >90 ML/MIN/1.73M2
GLUCOSE BLD-MCNC: 110 MG/DL (ref 74–99)
GLUCOSE BLD-MCNC: 117 MG/DL (ref 74–99)
GLUCOSE BLD-MCNC: 120 MG/DL (ref 74–99)
GLUCOSE BLD-MCNC: 128 MG/DL (ref 74–99)
GLUCOSE BLD-MCNC: 172 MG/DL (ref 74–99)
GLUCOSE BLD-MCNC: 186 MG/DL (ref 74–99)
GLUCOSE SERPL-MCNC: 146 MG/DL (ref 74–99)
GLUCOSE SERPL-MCNC: 99 MG/DL (ref 74–99)
HCT VFR BLD AUTO: 35.5 % (ref 37–54)
HGB BLD-MCNC: 11.7 G/DL (ref 12.5–16.5)
IMM GRANULOCYTES # BLD AUTO: <0.03 K/UL (ref 0–0.58)
IMM GRANULOCYTES NFR BLD: 0 % (ref 0–5)
LYMPHOCYTES NFR BLD: 0.62 K/UL (ref 1.5–4)
LYMPHOCYTES RELATIVE PERCENT: 12 % (ref 20–42)
MCH RBC QN AUTO: 32.1 PG (ref 26–35)
MCHC RBC AUTO-ENTMCNC: 33 G/DL (ref 32–34.5)
MCV RBC AUTO: 97.5 FL (ref 80–99.9)
MONOCYTES NFR BLD: 0.89 K/UL (ref 0.1–0.95)
MONOCYTES NFR BLD: 18 % (ref 2–12)
NEUTROPHILS NFR BLD: 68 % (ref 43–80)
NEUTS SEG NFR BLD: 3.44 K/UL (ref 1.8–7.3)
PLATELET # BLD AUTO: 136 K/UL (ref 130–450)
PMV BLD AUTO: 9.7 FL (ref 7–12)
POTASSIUM SERPL-SCNC: 4.4 MMOL/L (ref 3.5–5)
POTASSIUM SERPL-SCNC: 5.3 MMOL/L (ref 3.5–5)
RBC # BLD AUTO: 3.64 M/UL (ref 3.8–5.8)
SODIUM SERPL-SCNC: 129 MMOL/L (ref 132–146)
SODIUM SERPL-SCNC: 131 MMOL/L (ref 132–146)
WBC OTHER # BLD: 5.1 K/UL (ref 4.5–11.5)

## 2024-06-16 PROCEDURE — 99232 SBSQ HOSP IP/OBS MODERATE 35: CPT | Performed by: NEUROLOGICAL SURGERY

## 2024-06-16 PROCEDURE — 80048 BASIC METABOLIC PNL TOTAL CA: CPT

## 2024-06-16 PROCEDURE — 2140000000 HC CCU INTERMEDIATE R&B

## 2024-06-16 PROCEDURE — 2580000003 HC RX 258

## 2024-06-16 PROCEDURE — 85025 COMPLETE CBC W/AUTO DIFF WBC: CPT

## 2024-06-16 PROCEDURE — 36415 COLL VENOUS BLD VENIPUNCTURE: CPT

## 2024-06-16 PROCEDURE — 99223 1ST HOSP IP/OBS HIGH 75: CPT | Performed by: SURGERY

## 2024-06-16 PROCEDURE — 6370000000 HC RX 637 (ALT 250 FOR IP)

## 2024-06-16 PROCEDURE — 82962 GLUCOSE BLOOD TEST: CPT

## 2024-06-16 RX ORDER — INSULIN LISPRO 100 [IU]/ML
0-16 INJECTION, SOLUTION INTRAVENOUS; SUBCUTANEOUS
Status: DISCONTINUED | OUTPATIENT
Start: 2024-06-16 | End: 2024-06-20 | Stop reason: HOSPADM

## 2024-06-16 RX ORDER — DEXTROSE MONOHYDRATE 100 MG/ML
INJECTION, SOLUTION INTRAVENOUS CONTINUOUS PRN
Status: DISCONTINUED | OUTPATIENT
Start: 2024-06-16 | End: 2024-06-20 | Stop reason: HOSPADM

## 2024-06-16 RX ORDER — LANOLIN ALCOHOL/MO/W.PET/CERES
3 CREAM (GRAM) TOPICAL NIGHTLY PRN
Status: DISCONTINUED | OUTPATIENT
Start: 2024-06-16 | End: 2024-06-20 | Stop reason: HOSPADM

## 2024-06-16 RX ORDER — FERROUS SULFATE 325(65) MG
325 TABLET ORAL
Status: DISCONTINUED | OUTPATIENT
Start: 2024-06-16 | End: 2024-06-20 | Stop reason: HOSPADM

## 2024-06-16 RX ORDER — ATORVASTATIN CALCIUM 80 MG/1
80 TABLET, FILM COATED ORAL DAILY
Status: DISCONTINUED | OUTPATIENT
Start: 2024-06-16 | End: 2024-06-20 | Stop reason: HOSPADM

## 2024-06-16 RX ORDER — GLUCAGON 1 MG/ML
1 KIT INJECTION PRN
Status: DISCONTINUED | OUTPATIENT
Start: 2024-06-16 | End: 2024-06-16 | Stop reason: SDUPTHER

## 2024-06-16 RX ORDER — DEXTROSE MONOHYDRATE 100 MG/ML
INJECTION, SOLUTION INTRAVENOUS CONTINUOUS PRN
Status: DISCONTINUED | OUTPATIENT
Start: 2024-06-16 | End: 2024-06-16 | Stop reason: SDUPTHER

## 2024-06-16 RX ORDER — GLUCAGON 1 MG/ML
1 KIT INJECTION PRN
Status: DISCONTINUED | OUTPATIENT
Start: 2024-06-16 | End: 2024-06-20 | Stop reason: HOSPADM

## 2024-06-16 RX ADMIN — CARVEDILOL 3.12 MG: 3.12 TABLET, FILM COATED ORAL at 17:58

## 2024-06-16 RX ADMIN — ATORVASTATIN CALCIUM 80 MG: 80 TABLET, FILM COATED ORAL at 10:14

## 2024-06-16 RX ADMIN — PHENOBARBITAL 64.8 MG: 32.4 TABLET ORAL at 00:33

## 2024-06-16 RX ADMIN — Medication 5 MG: at 22:03

## 2024-06-16 RX ADMIN — BACITRACIN ZINC: 500 OINTMENT TOPICAL at 12:03

## 2024-06-16 RX ADMIN — TRAMADOL HYDROCHLORIDE 50 MG: 50 TABLET ORAL at 18:01

## 2024-06-16 RX ADMIN — LEVETIRACETAM 500 MG: 500 TABLET, FILM COATED ORAL at 08:38

## 2024-06-16 RX ADMIN — GABAPENTIN 300 MG: 300 CAPSULE ORAL at 22:03

## 2024-06-16 RX ADMIN — SODIUM CHLORIDE, PRESERVATIVE FREE 10 ML: 5 INJECTION INTRAVENOUS at 22:04

## 2024-06-16 RX ADMIN — FERROUS SULFATE TAB 325 MG (65 MG ELEMENTAL FE) 325 MG: 325 (65 FE) TAB at 10:14

## 2024-06-16 RX ADMIN — TRAMADOL HYDROCHLORIDE 50 MG: 50 TABLET ORAL at 10:13

## 2024-06-16 RX ADMIN — PHENOBARBITAL 64.8 MG: 32.4 TABLET ORAL at 11:03

## 2024-06-16 RX ADMIN — PHENOBARBITAL 64.8 MG: 32.4 TABLET ORAL at 06:11

## 2024-06-16 RX ADMIN — Medication 100 MG: at 08:38

## 2024-06-16 RX ADMIN — INSULIN HUMAN 10 UNITS: 100 INJECTION, SOLUTION PARENTERAL at 10:23

## 2024-06-16 RX ADMIN — BACITRACIN ZINC: 500 OINTMENT TOPICAL at 08:38

## 2024-06-16 RX ADMIN — DEXTROSE MONOHYDRATE 250 ML: 100 INJECTION, SOLUTION INTRAVENOUS at 10:22

## 2024-06-16 RX ADMIN — GABAPENTIN 300 MG: 300 CAPSULE ORAL at 08:38

## 2024-06-16 RX ADMIN — FOLIC ACID 1 MG: 1 TABLET ORAL at 08:38

## 2024-06-16 RX ADMIN — MULTIVITAMIN TABLET 1 TABLET: TABLET at 08:38

## 2024-06-16 RX ADMIN — SODIUM CHLORIDE, PRESERVATIVE FREE 10 ML: 5 INJECTION INTRAVENOUS at 08:39

## 2024-06-16 RX ADMIN — PHENOBARBITAL 64.8 MG: 32.4 TABLET ORAL at 17:58

## 2024-06-16 RX ADMIN — PANTOPRAZOLE SODIUM 40 MG: 40 TABLET, DELAYED RELEASE ORAL at 08:38

## 2024-06-16 RX ADMIN — TRAMADOL HYDROCHLORIDE 50 MG: 50 TABLET ORAL at 01:46

## 2024-06-16 RX ADMIN — BACITRACIN ZINC: 500 OINTMENT TOPICAL at 22:09

## 2024-06-16 RX ADMIN — LEVETIRACETAM 500 MG: 500 TABLET, FILM COATED ORAL at 22:03

## 2024-06-16 RX ADMIN — CARVEDILOL 3.12 MG: 3.12 TABLET, FILM COATED ORAL at 08:37

## 2024-06-16 ASSESSMENT — PAIN DESCRIPTION - DESCRIPTORS
DESCRIPTORS: ACHING;DISCOMFORT;THROBBING
DESCRIPTORS: ACHING;DISCOMFORT;DULL
DESCRIPTORS: ACHING;DISCOMFORT;SORE

## 2024-06-16 ASSESSMENT — PAIN DESCRIPTION - LOCATION
LOCATION: BACK;SHOULDER;LEG
LOCATION: SHOULDER
LOCATION: HIP;BACK;SHOULDER

## 2024-06-16 ASSESSMENT — PAIN SCALES - GENERAL
PAINLEVEL_OUTOF10: 0
PAINLEVEL_OUTOF10: 8
PAINLEVEL_OUTOF10: 4
PAINLEVEL_OUTOF10: 0
PAINLEVEL_OUTOF10: 9
PAINLEVEL_OUTOF10: 9
PAINLEVEL_OUTOF10: 0

## 2024-06-16 ASSESSMENT — PAIN DESCRIPTION - ORIENTATION
ORIENTATION: LEFT
ORIENTATION: RIGHT
ORIENTATION: RIGHT;LEFT

## 2024-06-16 ASSESSMENT — PAIN - FUNCTIONAL ASSESSMENT
PAIN_FUNCTIONAL_ASSESSMENT: PREVENTS OR INTERFERES SOME ACTIVE ACTIVITIES AND ADLS
PAIN_FUNCTIONAL_ASSESSMENT: ACTIVITIES ARE NOT PREVENTED

## 2024-06-16 NOTE — PROGRESS NOTES
Department of Neurosurgery  Attending Progress Note    CHIEF COMPLAINT: seen for ICH    SUBJECTIVE:  no complaints    ROS:  HEENT: negative for headache  CVS: negative for chest pain  Resp: negative for SOB.     OBJECTIVE  Physical  VITALS:  BP (!) 145/56   Pulse 69   Temp 97.9 °F (36.6 °C) (Temporal)   Resp 17   Ht 1.803 m (5' 11\")   Wt 94.3 kg (208 lb)   SpO2 98%   BMI 29.01 kg/m²   NEUROLOGIC:  Mental Status Exam:  Level of Alertness:   awake  Orientation:   person, place, time  Motor Exam:  Motor exam is symmetrical 5 out of 5 all extremities bilaterally  Sensory:  Sensory intact    Data  CBC:   Lab Results   Component Value Date/Time    WBC 9.5 06/15/2024 06:00 AM    RBC 3.37 06/15/2024 06:00 AM    HGB 10.5 06/15/2024 06:00 AM    HCT 31.9 06/15/2024 06:00 AM    MCV 94.7 06/15/2024 06:00 AM    MCH 31.2 06/15/2024 06:00 AM    MCHC 32.9 06/15/2024 06:00 AM    RDW 13.8 06/15/2024 06:00 AM     06/15/2024 06:00 AM    MPV 9.5 06/15/2024 06:00 AM     Current Inpatient Medications  Current Facility-Administered Medications: insulin lispro (HUMALOG,ADMELOG) injection vial 0-16 Units, 0-16 Units, SubCUTAneous, 4x Daily AC & HS  glucose chewable tablet 16 g, 4 tablet, Oral, PRN  dextrose bolus 10% 125 mL, 125 mL, IntraVENous, PRN **OR** dextrose bolus 10% 250 mL, 250 mL, IntraVENous, PRN  glucagon injection 1 mg, 1 mg, SubCUTAneous, PRN  dextrose 10 % infusion, , IntraVENous, Continuous PRN  0.9 % sodium chloride infusion, , IntraVENous, Continuous  sodium chloride flush 0.9 % injection 10 mL, 10 mL, IntraVENous, 2 times per day  sodium chloride flush 0.9 % injection 10 mL, 10 mL, IntraVENous, PRN  0.9 % sodium chloride infusion, , IntraVENous, PRN  ondansetron (ZOFRAN-ODT) disintegrating tablet 4 mg, 4 mg, Oral, Q8H PRN **OR** ondansetron (ZOFRAN) injection 4 mg, 4 mg, IntraVENous, Q6H PRN  polyethylene glycol (GLYCOLAX) packet 17 g, 17 g, Oral, Daily  sennosides-docusate sodium (SENOKOT-S) 8.6-50 MG tablet

## 2024-06-16 NOTE — PROGRESS NOTES
Asked pt about home med list. Pt stated that he has a list at home, but does not know off the top of his head. Pt stated there is no one at home who this RN could call to obtain the med list. Pt stated he would make a call, but pt has no update for this RN yet.

## 2024-06-16 NOTE — PROGRESS NOTES
Trauma Tertiary Survey    Admit Date: 6/15/2024  Hospital day 1    CC:  Fall    Alcohol pre-screening:  Men: How many times in the past year have you had 5 or more drinks in a day?  1 or more  How much do you drink on a daily basis? 4-5 beers / day    Drug Pre-screening:    How many times in the past year have you used a recreational drug or used a prescription medication for non medical reasons?  none    Mood Prescreening:    During the past two weeks, have you been bothered by little interest or pleasure doing things?  No  During the past two weeks, have you been bothered by feeling down, depressed or hopeless?  No      Scheduled Meds:   insulin lispro  0-16 Units SubCUTAneous 4x Daily AC & HS    sodium chloride flush  10 mL IntraVENous 2 times per day    polyethylene glycol  17 g Oral Daily    sennosides-docusate sodium  1 tablet Oral BID    bacitracin zinc   Topical TID    carvedilol  3.125 mg Oral BID with meals    gabapentin  300 mg Oral BID    pantoprazole  40 mg Oral Daily    melatonin  5 mg Oral Nightly    PHENobarbital  64.8 mg Oral Q6H    folic acid  1 mg Oral Daily    multivitamin  1 tablet Oral Daily    thiamine  100 mg Oral Daily    levETIRAcetam  500 mg Oral BID     Continuous Infusions:   dextrose      sodium chloride 75 mL/hr at 06/15/24 1619    sodium chloride       PRN Meds:glucose, dextrose bolus **OR** dextrose bolus, glucagon (rDNA), dextrose, sodium chloride flush, sodium chloride, ondansetron **OR** ondansetron, cyclobenzaprine, traMADol, acetaminophen, hydrALAZINE, labetalol    Subjective:     Feeling okay this morning.  No new complaints on tertiary exam.    Objective:   Patient Vitals for the past 8 hrs:   BP Temp Temp src Pulse Resp SpO2   06/16/24 0821 (!) 152/64 98.9 °F (37.2 °C) Oral 66 18 98 %   06/16/24 0313 (!) 145/56 97.9 °F (36.6 °C) Temporal 69 17 98 %   06/16/24 0216 -- -- -- -- 17 --   06/16/24 0146 -- -- -- -- 18 --       I/O last 3 completed shifts:  In: -   Out: 3650

## 2024-06-16 NOTE — DISCHARGE SUMMARY
Physician Discharge Summary     Patient ID:  Jerome Steel  [id number]  63 y.o.  1960    Admit date: 6/15/2024    Discharge date and time: 6/20/2024  3:29 PM     Admitting Physician: Francois Dominique MD     Admission Diagnoses: Intracranial hemorrhage (HCC) [I62.9]  Intracranial bleed (HCC) [I62.9]    Discharge Diagnoses: Principal Problem:    Intracranial hemorrhage (HCC)  Resolved Problems:    * No resolved hospital problems. *      Admission Condition: fair    Discharged Condition: stable    Indication for Admission: SAH    Hospital Course/Procedures/Operation/treatments:   6/15: Pt states he had a fall yesterday around 9 pm due to losing his balance. He fell down 1-3 stairs. He states this was due to the left sided weakness he has from his stroke 20 years ago.  Imaging with a SAH, patient is on aspirin and Plavix.  Neurosurgery was consulted.  6/16: Repeat head CT shows a stable SAH.  No plans for neurosurgical intervention, continue with medical management. PT/OT  6/17: PT/OT  6/18: 12/24 with PT. GCS 15.  6/19: No acute issues. DC when placed  6/20: Doing well. DC      Consults:   IP CONSULT TO TRAUMA SURGERY  IP CONSULT TO NEUROSURGERY  IP CONSULT TO PHYSICAL MEDICINE REHAB    Significant Diagnostic Studies:   CT HEAD WO CONTRAST    Result Date: 6/15/2024  EXAMINATION: CT OF THE HEAD WITHOUT CONTRAST  6/15/2024 6:46 am TECHNIQUE: CT of the head was performed without the administration of intravenous contrast. Automated exposure control, iterative reconstruction, and/or weight based adjustment of the mA/kV was utilized to reduce the radiation dose to as low as reasonably achievable. COMPARISON: 06/14/2024 HISTORY: ORDERING SYSTEM PROVIDED HISTORY: eval SAH TECHNOLOGIST PROVIDED HISTORY: Has a \"code stroke\" or \"stroke alert\" been called?->No Reason for exam:->eval SAH What reading provider will be dictating this exam?->CRC FINDINGS: BRAIN/VENTRICLES: There is no acute intracranial hemorrhage, mass effect or  first 48 hours. Your brain needs to rest so that it can recover. You may find that it helps to take time off school or work.   Limit exposure to bright lights, loud noises, and crowds for the first 48 hours, as these can make your symptoms worse  Limit use of screens, such as an IPad, computer, cellphone, TV, etc, as these can make symptoms, especially headaches, worse.      Work/School  It is recommended that you wait to return to work/school until after your follow-up appointment with trauma or your family doctor.  If you are having no symptoms, please call for an earlier appointment  Some people find it hard to concentrate well so return to your normal activities slowly. Go back to work or school for half days at first, and increase as tolerated. Trauma services can help you with a graduated schedule.  If you feel comfortable doing so, tell work or school about your concussion. You may have to adjust your activities, depending on your job or school demands.       Rest and Sleep  Rest for the first 24 hours; it's one of the best things to help your brain recover.  It's okay to sleep if you want  You don't have to be woken up every few hours.  If someone does wake you up, you should awaken easily.    Do some light physical activity (housework) or light exercise (walking, stationary bike) as soon as you can tolerate the movement.  Strenuous exercise (such as jogging or weight lifting) can make your concussion symptoms worse or last longer.    Diet  Return to a normal diet as tolerated, you may want to start with liquids first  Eat healthy meals (including breakfast) and snacks throughout the day as your brain heals.    Managing Pain  Tylenol or Ibuprofen are the best meds to take for headaches.  Your doctor may have prescribed you medications if your headaches were severe or you have other injuries.  Please take as directed.  Driving  Your ability to concentrate and react quickly might be affected by the concussion.

## 2024-06-16 NOTE — PLAN OF CARE
Problem: Safety - Adult  Goal: Free from fall injury  6/16/2024 0945 by Mariana Sweeney RN  Outcome: Progressing  6/16/2024 0141 by Teresita Sanchez RN  Outcome: Progressing     Problem: Chronic Conditions and Co-morbidities  Goal: Patient's chronic conditions and co-morbidity symptoms are monitored and maintained or improved  6/16/2024 0945 by Mariana Sweeney RN  Outcome: Progressing  6/16/2024 0141 by Teresita Sanchez RN  Outcome: Progressing     Problem: Discharge Planning  Goal: Discharge to home or other facility with appropriate resources  6/16/2024 0945 by Mariana Sweeney RN  Outcome: Progressing  6/16/2024 0141 by Teresita Sanchez RN  Outcome: Progressing     Problem: Pain  Goal: Verbalizes/displays adequate comfort level or baseline comfort level  6/16/2024 0945 by Mariana Sweeney RN  Outcome: Progressing  6/16/2024 0141 by Teresita Sanchez RN  Outcome: Progressing     Problem: Skin/Tissue Integrity  Goal: Absence of new skin breakdown  Description: 1.  Monitor for areas of redness and/or skin breakdown  2.  Assess vascular access sites hourly  3.  Every 4-6 hours minimum:  Change oxygen saturation probe site  4.  Every 4-6 hours:  If on nasal continuous positive airway pressure, respiratory therapy assess nares and determine need for appliance change or resting period.  6/16/2024 0945 by Mariana Sweeney RN  Outcome: Progressing  6/16/2024 0141 by Teresita Sanchez RN  Outcome: Progressing     Problem: ABCDS Injury Assessment  Goal: Absence of physical injury  6/16/2024 0945 by Mariana Sweeney RN  Outcome: Progressing  6/16/2024 0141 by Teresita Sanchez RN  Outcome: Progressing

## 2024-06-16 NOTE — PROGRESS NOTES
Patient's brother, Yimi, brought in patient's home medication list. This RN reviewed it and updated it accordingly in the charting system.  Melissa Vieira RN

## 2024-06-17 DIAGNOSIS — I62.9 INTRACRANIAL HEMORRHAGE (HCC): ICD-10-CM

## 2024-06-17 DIAGNOSIS — I63.9 CEREBROVASCULAR ACCIDENT (CVA), UNSPECIFIED MECHANISM (HCC): Primary | ICD-10-CM

## 2024-06-17 DIAGNOSIS — E11.9 TYPE 2 DIABETES MELLITUS WITHOUT COMPLICATION, WITHOUT LONG-TERM CURRENT USE OF INSULIN (HCC): ICD-10-CM

## 2024-06-17 LAB
ANION GAP SERPL CALCULATED.3IONS-SCNC: 13 MMOL/L (ref 7–16)
BASOPHILS # BLD: 0.02 K/UL (ref 0–0.2)
BASOPHILS NFR BLD: 1 % (ref 0–2)
BUN SERPL-MCNC: 10 MG/DL (ref 6–23)
CALCIUM SERPL-MCNC: 7.8 MG/DL (ref 8.6–10.2)
CHLORIDE SERPL-SCNC: 96 MMOL/L (ref 98–107)
CO2 SERPL-SCNC: 18 MMOL/L (ref 22–29)
CREAT SERPL-MCNC: 0.8 MG/DL (ref 0.7–1.2)
EOSINOPHIL # BLD: 0.07 K/UL (ref 0.05–0.5)
EOSINOPHILS RELATIVE PERCENT: 3 % (ref 0–6)
ERYTHROCYTE [DISTWIDTH] IN BLOOD BY AUTOMATED COUNT: 14.2 % (ref 11.5–15)
GFR, ESTIMATED: >90 ML/MIN/1.73M2
GLUCOSE BLD-MCNC: 100 MG/DL (ref 74–99)
GLUCOSE BLD-MCNC: 144 MG/DL (ref 74–99)
GLUCOSE BLD-MCNC: 190 MG/DL (ref 74–99)
GLUCOSE BLD-MCNC: 208 MG/DL (ref 74–99)
GLUCOSE SERPL-MCNC: 95 MG/DL (ref 74–99)
HCT VFR BLD AUTO: 31.8 % (ref 37–54)
HGB BLD-MCNC: 9.8 G/DL (ref 12.5–16.5)
IMM GRANULOCYTES # BLD AUTO: <0.03 K/UL (ref 0–0.58)
IMM GRANULOCYTES NFR BLD: 1 % (ref 0–5)
LYMPHOCYTES NFR BLD: 0.63 K/UL (ref 1.5–4)
LYMPHOCYTES RELATIVE PERCENT: 23 % (ref 20–42)
MCH RBC QN AUTO: 31.2 PG (ref 26–35)
MCHC RBC AUTO-ENTMCNC: 30.8 G/DL (ref 32–34.5)
MCV RBC AUTO: 101.3 FL (ref 80–99.9)
MONOCYTES NFR BLD: 0.32 K/UL (ref 0.1–0.95)
MONOCYTES NFR BLD: 12 % (ref 2–12)
NEUTROPHILS NFR BLD: 62 % (ref 43–80)
NEUTS SEG NFR BLD: 1.72 K/UL (ref 1.8–7.3)
PLATELET # BLD AUTO: 135 K/UL (ref 130–450)
PMV BLD AUTO: 9.5 FL (ref 7–12)
POTASSIUM SERPL-SCNC: 4 MMOL/L (ref 3.5–5)
RBC # BLD AUTO: 3.14 M/UL (ref 3.8–5.8)
SODIUM SERPL-SCNC: 127 MMOL/L (ref 132–146)
WBC OTHER # BLD: 2.8 K/UL (ref 4.5–11.5)

## 2024-06-17 PROCEDURE — 80048 BASIC METABOLIC PNL TOTAL CA: CPT

## 2024-06-17 PROCEDURE — 2580000003 HC RX 258

## 2024-06-17 PROCEDURE — 97530 THERAPEUTIC ACTIVITIES: CPT

## 2024-06-17 PROCEDURE — 6370000000 HC RX 637 (ALT 250 FOR IP)

## 2024-06-17 PROCEDURE — 97161 PT EVAL LOW COMPLEX 20 MIN: CPT

## 2024-06-17 PROCEDURE — 2140000000 HC CCU INTERMEDIATE R&B

## 2024-06-17 PROCEDURE — 85025 COMPLETE CBC W/AUTO DIFF WBC: CPT

## 2024-06-17 PROCEDURE — 6360000002 HC RX W HCPCS

## 2024-06-17 PROCEDURE — 99232 SBSQ HOSP IP/OBS MODERATE 35: CPT | Performed by: SURGERY

## 2024-06-17 PROCEDURE — 97165 OT EVAL LOW COMPLEX 30 MIN: CPT

## 2024-06-17 PROCEDURE — 97535 SELF CARE MNGMENT TRAINING: CPT

## 2024-06-17 PROCEDURE — 36415 COLL VENOUS BLD VENIPUNCTURE: CPT

## 2024-06-17 PROCEDURE — 82962 GLUCOSE BLOOD TEST: CPT

## 2024-06-17 RX ORDER — ENOXAPARIN SODIUM 100 MG/ML
30 INJECTION SUBCUTANEOUS 2 TIMES DAILY
Status: DISCONTINUED | OUTPATIENT
Start: 2024-06-17 | End: 2024-06-20 | Stop reason: HOSPADM

## 2024-06-17 RX ORDER — SODIUM CHLORIDE 1 G/1
1 TABLET ORAL
Status: DISCONTINUED | OUTPATIENT
Start: 2024-06-17 | End: 2024-06-20 | Stop reason: HOSPADM

## 2024-06-17 RX ADMIN — SODIUM CHLORIDE 1 G: 1 TABLET ORAL at 09:14

## 2024-06-17 RX ADMIN — SODIUM CHLORIDE, PRESERVATIVE FREE 10 ML: 5 INJECTION INTRAVENOUS at 09:15

## 2024-06-17 RX ADMIN — PHENOBARBITAL 64.8 MG: 32.4 TABLET ORAL at 00:52

## 2024-06-17 RX ADMIN — PHENOBARBITAL 64.8 MG: 32.4 TABLET ORAL at 12:18

## 2024-06-17 RX ADMIN — TRAMADOL HYDROCHLORIDE 50 MG: 50 TABLET ORAL at 05:17

## 2024-06-17 RX ADMIN — PHENOBARBITAL 64.8 MG: 32.4 TABLET ORAL at 18:12

## 2024-06-17 RX ADMIN — BACITRACIN ZINC: 500 OINTMENT TOPICAL at 13:47

## 2024-06-17 RX ADMIN — CARVEDILOL 3.12 MG: 3.12 TABLET, FILM COATED ORAL at 18:12

## 2024-06-17 RX ADMIN — INSULIN LISPRO 4 UNITS: 100 INJECTION, SOLUTION INTRAVENOUS; SUBCUTANEOUS at 21:41

## 2024-06-17 RX ADMIN — SODIUM CHLORIDE, PRESERVATIVE FREE 10 ML: 5 INJECTION INTRAVENOUS at 21:28

## 2024-06-17 RX ADMIN — ENOXAPARIN SODIUM 30 MG: 100 INJECTION SUBCUTANEOUS at 21:27

## 2024-06-17 RX ADMIN — GABAPENTIN 300 MG: 300 CAPSULE ORAL at 09:15

## 2024-06-17 RX ADMIN — TRAMADOL HYDROCHLORIDE 50 MG: 50 TABLET ORAL at 21:51

## 2024-06-17 RX ADMIN — BACITRACIN ZINC: 500 OINTMENT TOPICAL at 09:15

## 2024-06-17 RX ADMIN — MULTIVITAMIN TABLET 1 TABLET: TABLET at 09:15

## 2024-06-17 RX ADMIN — Medication 5 MG: at 21:28

## 2024-06-17 RX ADMIN — FERROUS SULFATE TAB 325 MG (65 MG ELEMENTAL FE) 325 MG: 325 (65 FE) TAB at 09:14

## 2024-06-17 RX ADMIN — LEVETIRACETAM 500 MG: 500 TABLET, FILM COATED ORAL at 21:28

## 2024-06-17 RX ADMIN — CARVEDILOL 3.12 MG: 3.12 TABLET, FILM COATED ORAL at 09:15

## 2024-06-17 RX ADMIN — PANTOPRAZOLE SODIUM 40 MG: 40 TABLET, DELAYED RELEASE ORAL at 09:14

## 2024-06-17 RX ADMIN — BACITRACIN ZINC: 500 OINTMENT TOPICAL at 21:42

## 2024-06-17 RX ADMIN — ENOXAPARIN SODIUM 30 MG: 100 INJECTION SUBCUTANEOUS at 09:14

## 2024-06-17 RX ADMIN — SODIUM CHLORIDE 1 G: 1 TABLET ORAL at 12:18

## 2024-06-17 RX ADMIN — LEVETIRACETAM 500 MG: 500 TABLET, FILM COATED ORAL at 09:14

## 2024-06-17 RX ADMIN — Medication 100 MG: at 09:14

## 2024-06-17 RX ADMIN — SODIUM CHLORIDE: 9 INJECTION, SOLUTION INTRAVENOUS at 00:54

## 2024-06-17 RX ADMIN — TRAMADOL HYDROCHLORIDE 50 MG: 50 TABLET ORAL at 13:45

## 2024-06-17 RX ADMIN — POLYETHYLENE GLYCOL 3350 17 G: 17 POWDER, FOR SOLUTION ORAL at 09:15

## 2024-06-17 RX ADMIN — GABAPENTIN 300 MG: 300 CAPSULE ORAL at 21:28

## 2024-06-17 RX ADMIN — SODIUM CHLORIDE 1 G: 1 TABLET ORAL at 18:12

## 2024-06-17 RX ADMIN — SENNOSIDES AND DOCUSATE SODIUM 1 TABLET: 50; 8.6 TABLET ORAL at 09:14

## 2024-06-17 RX ADMIN — PHENOBARBITAL 64.8 MG: 32.4 TABLET ORAL at 05:10

## 2024-06-17 RX ADMIN — ATORVASTATIN CALCIUM 80 MG: 80 TABLET, FILM COATED ORAL at 09:14

## 2024-06-17 RX ADMIN — FOLIC ACID 1 MG: 1 TABLET ORAL at 09:15

## 2024-06-17 ASSESSMENT — PAIN DESCRIPTION - ORIENTATION: ORIENTATION: LEFT

## 2024-06-17 ASSESSMENT — PAIN SCALES - GENERAL
PAINLEVEL_OUTOF10: 6
PAINLEVEL_OUTOF10: 9
PAINLEVEL_OUTOF10: 7
PAINLEVEL_OUTOF10: 0

## 2024-06-17 ASSESSMENT — PAIN DESCRIPTION - LOCATION
LOCATION: GENERALIZED
LOCATION: GENERALIZED

## 2024-06-17 ASSESSMENT — PAIN DESCRIPTION - DESCRIPTORS
DESCRIPTORS: ACHING;DISCOMFORT
DESCRIPTORS: ACHING;DISCOMFORT;SORE
DESCRIPTORS: ACHING;DISCOMFORT

## 2024-06-17 ASSESSMENT — PAIN - FUNCTIONAL ASSESSMENT: PAIN_FUNCTIONAL_ASSESSMENT: ACTIVITIES ARE NOT PREVENTED

## 2024-06-17 NOTE — PLAN OF CARE
Problem: Safety - Adult  Goal: Free from fall injury  Outcome: Progressing  Flowsheets (Taken 6/17/2024 0800)  Free From Fall Injury: Instruct family/caregiver on patient safety     Problem: Chronic Conditions and Co-morbidities  Goal: Patient's chronic conditions and co-morbidity symptoms are monitored and maintained or improved  Outcome: Progressing     Problem: Discharge Planning  Goal: Discharge to home or other facility with appropriate resources  Outcome: Progressing     Problem: Pain  Goal: Verbalizes/displays adequate comfort level or baseline comfort level  Outcome: Progressing     Problem: Skin/Tissue Integrity  Goal: Absence of new skin breakdown  Description: 1.  Monitor for areas of redness and/or skin breakdown  2.  Assess vascular access sites hourly  3.  Every 4-6 hours minimum:  Change oxygen saturation probe site  4.  Every 4-6 hours:  If on nasal continuous positive airway pressure, respiratory therapy assess nares and determine need for appliance change or resting period.  Outcome: Progressing     Problem: ABCDS Injury Assessment  Goal: Absence of physical injury  Outcome: Progressing  Flowsheets (Taken 6/17/2024 0800)  Absence of Physical Injury: Implement safety measures based on patient assessment

## 2024-06-17 NOTE — TELEPHONE ENCOUNTER
Last Appointment   5/10/2024  Next Appointment  8/15/2024    Metformin 500mg [facility] laura silva

## 2024-06-17 NOTE — PROGRESS NOTES
[facility] Trauma Services.    Daily Progress Note 6/17/2024    Date of admission:  6/15/2024    CC: Fall    Subjective:    Doing well. GCS 15. States he uses a wheelchair and walker at baseline. Doing well today.     Objective:  BP (!) 117/57   Pulse 66   Temp 97.9 °F (36.6 °C) (Oral)   Resp 16   Ht 1.803 m (5' 11\")   Wt 94.3 kg (208 lb)   SpO2 95%   BMI 29.01 kg/m²       PHYSICAL EXAM  General: No apparent distress, comfortable   HEENT: Trachea midline, no masses, Pupils equal round   Chest: Respiratory effort was normal with no retractions or use of accessory muscles.  Cardiovascular: Extremities warm, well perfused,   Abdomen:  Soft and non distended.  No tenderness, guarding, rebound, or rigidity   Extremities: Bandage on upper extremity    Assessment/Plan:  63 y.o. male     Principal Problem:    Intracranial hemorrhage (HCC)  Resolved Problems:    * No resolved hospital problems. *    Neuro:  GCS 15, ICH (stable), history of alcohol abuse, acute pain syndrome  Per neurosurgery consult, continue Keppra  Continue with phenobarbital; monitor for signs of withdrawal  Thiamine, multivitamin, folate  CV: Hx CVA, DM, HTN, HLD   Cont to hold ASA/Plavix   Resume home meds as appropriate  Pulm: tolerating room air          Encourage send spirometry  GI: tolerating general diet   Continue with diabetic diet, sliding scale  Renal: Hyperkalemia; resolved, hyponatremia  Fluid restriction  ID: afebrile, no acute issues     Endocrine: DM  SSI  MSK: no acute issues   PT/OT   Heme: Anemia   9.8 today, cont to monitor. No clinical sign of active bleeding     Bowel regime: colace, MOM   Pain control/Sedation: Multimodal pain control  DVT prophylaxis: SCDs    GI: diet   Glucose protocol:   Mouth/Eye care: per patient, .   Cole: none     Code status:    Full Code     Patient/Family update:  As available     Disposition:  PT/OT      Oscar Jay,       Attending Attestation   I saw and examined the patient, I agree

## 2024-06-17 NOTE — PROGRESS NOTES
PCP is Genet Kraus DO  Office notified of admission.      Electronically signed by Cherry Singletary RN on 6/17/2024 at 2:19 PM

## 2024-06-17 NOTE — PROGRESS NOTES
Occupational Therapy  OCCUPATIONAL THERAPY INITIAL EVALUATION    [facility]  [address] [place], [place]      Date:2024                                                Patient Name: Jerome Steel  MRN: [id number]  : 1960  Room: [address] [place] [address]    Evaluating OT: Devonte Miller OTR/L #[id number]     Referring Provider: Nina Zepeda DO   Specific Provider Orders/Date: OT eval and treat 6/15/24    Diagnosis: Intracranial hemorrhage (HCC) [I62.9]  Intracranial bleed (HCC) [I62.9]   Pt admitted to hospital following fall; [facility]     Pertinent Medical History:  has a past medical history of Arthritis, Bilateral carpal tunnel syndrome, Cerebral artery occlusion with cerebral infarction (HCC), Chronic back pain, Coronary artery disease involving native coronary artery of native heart without angina pectoris, CVA (cerebral vascular accident) (HCC), Diabetes mellitus (HCC), Diabetic foot ulcer with osteomyelitis (HCC), 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 2 diabetes mellitus, with necrosis of bone (HCC), Hemiparesis affecting left side as late effect of cerebrovascular accident (HCC), Hyperlipidemia, Hypertension, Moderate aortic regurgitation, Peripheral vascular angioplasty status, PFO (patent foramen ovale), Ulcer of left heel, with necrosis of bone (HCC), Ulcer of left heel, with necrosis of muscle (HCC), and Vitamin D deficiency.       Precautions:  Fall Risk, contact isolation, bed/chair alarm, Hx CVA with L hemiparesis     Assessment of current deficits    [x] Functional mobility  [x]ADLs  [x] Strength               []Cognition    [x] Functional transfers   [x] IADLs         [x] Safety Awareness   [x]Endurance    [x] Fine Coordination              [x] Balance      [] Vision/perception   []Sensation     [x]Gross Motor Coordination  [x] ROM  [] Delirium                   [] Motor Control     OT PLAN OF CARE   OT POC based

## 2024-06-17 NOTE — PROGRESS NOTES
[facility] Trauma Services.    Daily Progress Note 6/17/2024    Date of admission:  6/15/2024    CC: Fall    Subjective:    Doing well. GCS 15. States he uses a wheelchair and walker at baseline. Doing well today.     Objective:  BP (!) 117/57   Pulse 66   Temp 97.9 °F (36.6 °C) (Oral)   Resp 16   Ht 1.803 m (5' 11\")   Wt 94.3 kg (208 lb)   SpO2 95%   BMI 29.01 kg/m²       PHYSICAL EXAM  General: No apparent distress, comfortable   HEENT: Trachea midline, no masses, Pupils equal round   Chest: Respiratory effort was normal with no retractions or use of accessory muscles.  Cardiovascular: Extremities warm, well perfused,   Abdomen:  Soft and non distended.  No tenderness, guarding, rebound, or rigidity   Extremities: Bandage on upper extremity    Assessment/Plan:  63 y.o. male     Principal Problem:    Intracranial hemorrhage (HCC)  Resolved Problems:    * No resolved hospital problems. *    Neuro:  GCS 15, ICH (stable), history of alcohol abuse, acute pain syndrome  Per neurosurgery consult, continue Keppra  Continue with phenobarbital; monitor for signs of withdrawal  Thiamine, multivitamin, folate  CV: Hx CVA, DM, HTN, HLD   Cont to hold ASA/Plavix   Resume home meds as appropriate  Pulm: tolerating room air          Encourage send spirometry  GI: tolerating general diet   Continue with diabetic diet, sliding scale  Renal: Hyperkalemia; resolved, hyponatremia  Fluid restriction  ID: afebrile, no acute issues     Endocrine: DM  SSI  MSK: no acute issues   PT/OT   Heme: no acute issues       Bowel regime: colace, MOM   Pain control/Sedation: Multimodal pain control  DVT prophylaxis: SCDs  / Lovenox   GI: diet   Glucose protocol:   Mouth/Eye care: per patient, .   Cole: none     Code status:    Full Code     Patient/Family update:  As available     Disposition:  PT/OT      Oscar Jay DO

## 2024-06-17 NOTE — PROGRESS NOTES
Physical Therapy  Physical Therapy Initial Assessment     Name: Jerome Steel  : 1960  MRN: [id number]      Date of Service: 2024    Evaluating PT:  Nahun Collazo PT, DPT [id number]    Room #:  6411/6411-B  Diagnosis:  Intracranial hemorrhage (HCC) [I62.9]  Intracranial bleed (HCC) [I62.9]  PMHx/PSHx:    Past Medical History:   Diagnosis Date    Arthritis     Bilateral carpal tunnel syndrome 2016    Cerebral artery occlusion with cerebral infarction (HCC)     Chronic back pain     Coronary artery disease involving native coronary artery of native heart without angina pectoris 2022    CVA (cerebral vascular accident) (HCC) 2015,     Diabetes mellitus (HCC)     Type 2    Diabetic foot ulcer with osteomyelitis (HCC) 2021    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 2 diabetes mellitus, with necrosis of bone (HCC) 2021    Hemiparesis affecting left side as late effect of cerebrovascular accident (HCC)     LEFT SIDE NON DOMINANT FOLLOWING STROKE    Hyperlipidemia     Hypertension     Moderate aortic regurgitation     Peripheral vascular angioplasty status 2021    PFO (patent foramen ovale)     Ulcer of left heel, with necrosis of bone (HCC) 2021    Ulcer of left heel, with necrosis of muscle (HCC) 2021    Vitamin D deficiency      Procedure/Surgery:  none   Precautions:  Falls, contact isolation, chronic L hemiparesis s/p CVA, , \"L achilles removed\" per chart in , bed/chair alarm   Equipment Needs:  TBD    SUBJECTIVE:    Pt lives brother but \"alone 60% of the time\" in a split level home with 3 step(s) to enter and 1 rail(s).  Bedroom and bathroom on same level with 3 steps and 1 rail.     Pt used wheelchair primarily for mobility. Short distance ambulation completed with rollator. Pt also stated using cane to transfer to wheelchair.     OBJECTIVE:   Initial Evaluation  Date: 24 Treatment Short Term/ Long Term   Goals   AM-PAC 6 Clicks      Was

## 2024-06-18 LAB
ANION GAP SERPL CALCULATED.3IONS-SCNC: 12 MMOL/L (ref 7–16)
BASOPHILS # BLD: 0.02 K/UL (ref 0–0.2)
BASOPHILS NFR BLD: 1 % (ref 0–2)
BUN SERPL-MCNC: 13 MG/DL (ref 6–23)
CALCIUM SERPL-MCNC: 8.2 MG/DL (ref 8.6–10.2)
CHLORIDE SERPL-SCNC: 100 MMOL/L (ref 98–107)
CO2 SERPL-SCNC: 19 MMOL/L (ref 22–29)
CREAT SERPL-MCNC: 0.8 MG/DL (ref 0.7–1.2)
EOSINOPHIL # BLD: 0.16 K/UL (ref 0.05–0.5)
EOSINOPHILS RELATIVE PERCENT: 4 % (ref 0–6)
ERYTHROCYTE [DISTWIDTH] IN BLOOD BY AUTOMATED COUNT: 14.1 % (ref 11.5–15)
GFR, ESTIMATED: >90 ML/MIN/1.73M2
GLUCOSE BLD-MCNC: 130 MG/DL (ref 74–99)
GLUCOSE BLD-MCNC: 139 MG/DL (ref 74–99)
GLUCOSE BLD-MCNC: 161 MG/DL (ref 74–99)
GLUCOSE BLD-MCNC: 174 MG/DL (ref 74–99)
GLUCOSE SERPL-MCNC: 131 MG/DL (ref 74–99)
HCT VFR BLD AUTO: 29.8 % (ref 37–54)
HGB BLD-MCNC: 9.7 G/DL (ref 12.5–16.5)
IMM GRANULOCYTES # BLD AUTO: 0.03 K/UL (ref 0–0.58)
IMM GRANULOCYTES NFR BLD: 1 % (ref 0–5)
LYMPHOCYTES NFR BLD: 0.81 K/UL (ref 1.5–4)
LYMPHOCYTES RELATIVE PERCENT: 22 % (ref 20–42)
MCH RBC QN AUTO: 30.7 PG (ref 26–35)
MCHC RBC AUTO-ENTMCNC: 32.6 G/DL (ref 32–34.5)
MCV RBC AUTO: 94.3 FL (ref 80–99.9)
MONOCYTES NFR BLD: 0.31 K/UL (ref 0.1–0.95)
MONOCYTES NFR BLD: 8 % (ref 2–12)
NEUTROPHILS NFR BLD: 65 % (ref 43–80)
NEUTS SEG NFR BLD: 2.43 K/UL (ref 1.8–7.3)
PHENOBARBITAL: 11.7 UG/ML (ref 15–40)
PLATELET # BLD AUTO: 148 K/UL (ref 130–450)
PMV BLD AUTO: 9.6 FL (ref 7–12)
POTASSIUM SERPL-SCNC: 4 MMOL/L (ref 3.5–5)
RBC # BLD AUTO: 3.16 M/UL (ref 3.8–5.8)
SODIUM SERPL-SCNC: 131 MMOL/L (ref 132–146)
WBC OTHER # BLD: 3.8 K/UL (ref 4.5–11.5)

## 2024-06-18 PROCEDURE — 2140000000 HC CCU INTERMEDIATE R&B

## 2024-06-18 PROCEDURE — 2580000003 HC RX 258

## 2024-06-18 PROCEDURE — 80184 ASSAY OF PHENOBARBITAL: CPT

## 2024-06-18 PROCEDURE — 80048 BASIC METABOLIC PNL TOTAL CA: CPT

## 2024-06-18 PROCEDURE — 6360000002 HC RX W HCPCS

## 2024-06-18 PROCEDURE — 36415 COLL VENOUS BLD VENIPUNCTURE: CPT

## 2024-06-18 PROCEDURE — 99232 SBSQ HOSP IP/OBS MODERATE 35: CPT | Performed by: SURGERY

## 2024-06-18 PROCEDURE — 6370000000 HC RX 637 (ALT 250 FOR IP)

## 2024-06-18 PROCEDURE — 82962 GLUCOSE BLOOD TEST: CPT

## 2024-06-18 PROCEDURE — 85025 COMPLETE CBC W/AUTO DIFF WBC: CPT

## 2024-06-18 RX ADMIN — SODIUM CHLORIDE 1 G: 1 TABLET ORAL at 17:17

## 2024-06-18 RX ADMIN — LEVETIRACETAM 500 MG: 500 TABLET, FILM COATED ORAL at 09:18

## 2024-06-18 RX ADMIN — GABAPENTIN 300 MG: 300 CAPSULE ORAL at 09:19

## 2024-06-18 RX ADMIN — PHENOBARBITAL 64.8 MG: 32.4 TABLET ORAL at 00:47

## 2024-06-18 RX ADMIN — SENNOSIDES AND DOCUSATE SODIUM 1 TABLET: 50; 8.6 TABLET ORAL at 09:18

## 2024-06-18 RX ADMIN — PANTOPRAZOLE SODIUM 40 MG: 40 TABLET, DELAYED RELEASE ORAL at 09:19

## 2024-06-18 RX ADMIN — CARVEDILOL 3.12 MG: 3.12 TABLET, FILM COATED ORAL at 09:19

## 2024-06-18 RX ADMIN — FOLIC ACID 1 MG: 1 TABLET ORAL at 09:18

## 2024-06-18 RX ADMIN — PHENOBARBITAL 64.8 MG: 32.4 TABLET ORAL at 06:05

## 2024-06-18 RX ADMIN — PHENOBARBITAL 64.8 MG: 32.4 TABLET ORAL at 17:18

## 2024-06-18 RX ADMIN — BACITRACIN ZINC: 500 OINTMENT TOPICAL at 15:30

## 2024-06-18 RX ADMIN — BACITRACIN ZINC: 500 OINTMENT TOPICAL at 20:28

## 2024-06-18 RX ADMIN — PHENOBARBITAL 64.8 MG: 32.4 TABLET ORAL at 12:41

## 2024-06-18 RX ADMIN — ENOXAPARIN SODIUM 30 MG: 100 INJECTION SUBCUTANEOUS at 20:23

## 2024-06-18 RX ADMIN — SODIUM CHLORIDE, PRESERVATIVE FREE 10 ML: 5 INJECTION INTRAVENOUS at 20:23

## 2024-06-18 RX ADMIN — SODIUM CHLORIDE 1 G: 1 TABLET ORAL at 09:18

## 2024-06-18 RX ADMIN — SODIUM CHLORIDE, PRESERVATIVE FREE 10 ML: 5 INJECTION INTRAVENOUS at 09:19

## 2024-06-18 RX ADMIN — CARVEDILOL 3.12 MG: 3.12 TABLET, FILM COATED ORAL at 17:18

## 2024-06-18 RX ADMIN — LEVETIRACETAM 500 MG: 500 TABLET, FILM COATED ORAL at 20:23

## 2024-06-18 RX ADMIN — Medication 100 MG: at 09:18

## 2024-06-18 RX ADMIN — POLYETHYLENE GLYCOL 3350 17 G: 17 POWDER, FOR SOLUTION ORAL at 09:19

## 2024-06-18 RX ADMIN — ATORVASTATIN CALCIUM 80 MG: 80 TABLET, FILM COATED ORAL at 09:18

## 2024-06-18 RX ADMIN — ENOXAPARIN SODIUM 30 MG: 100 INJECTION SUBCUTANEOUS at 09:19

## 2024-06-18 RX ADMIN — TRAMADOL HYDROCHLORIDE 50 MG: 50 TABLET ORAL at 09:18

## 2024-06-18 RX ADMIN — BACITRACIN ZINC: 500 OINTMENT TOPICAL at 09:30

## 2024-06-18 RX ADMIN — GABAPENTIN 300 MG: 300 CAPSULE ORAL at 20:23

## 2024-06-18 RX ADMIN — MULTIVITAMIN TABLET 1 TABLET: TABLET at 09:19

## 2024-06-18 RX ADMIN — TRAMADOL HYDROCHLORIDE 50 MG: 50 TABLET ORAL at 17:24

## 2024-06-18 RX ADMIN — FERROUS SULFATE TAB 325 MG (65 MG ELEMENTAL FE) 325 MG: 325 (65 FE) TAB at 09:18

## 2024-06-18 RX ADMIN — SODIUM CHLORIDE 1 G: 1 TABLET ORAL at 12:41

## 2024-06-18 RX ADMIN — Medication 5 MG: at 20:23

## 2024-06-18 ASSESSMENT — PAIN - FUNCTIONAL ASSESSMENT
PAIN_FUNCTIONAL_ASSESSMENT: ACTIVITIES ARE NOT PREVENTED
PAIN_FUNCTIONAL_ASSESSMENT: ACTIVITIES ARE NOT PREVENTED

## 2024-06-18 ASSESSMENT — PAIN DESCRIPTION - LOCATION
LOCATION: GENERALIZED
LOCATION: GENERALIZED

## 2024-06-18 ASSESSMENT — PAIN DESCRIPTION - ORIENTATION
ORIENTATION: MID
ORIENTATION: MID

## 2024-06-18 ASSESSMENT — PAIN DESCRIPTION - DESCRIPTORS
DESCRIPTORS: ACHING;CRAMPING;DISCOMFORT
DESCRIPTORS: ACHING;CRAMPING;DISCOMFORT

## 2024-06-18 ASSESSMENT — PAIN SCALES - GENERAL
PAINLEVEL_OUTOF10: 8
PAINLEVEL_OUTOF10: 9

## 2024-06-18 NOTE — PLAN OF CARE
Problem: Safety - Adult  Goal: Free from fall injury  6/18/2024 0153 by Teresita Sanchez RN  Outcome: Progressing  6/17/2024 1735 by Agata Matias RN  Outcome: Progressing  Flowsheets (Taken 6/17/2024 0800)  Free From Fall Injury: Instruct family/caregiver on patient safety     Problem: Chronic Conditions and Co-morbidities  Goal: Patient's chronic conditions and co-morbidity symptoms are monitored and maintained or improved  6/18/2024 0153 by Teresita Sanchez RN  Outcome: Progressing  6/17/2024 1735 by Agata Matias RN  Outcome: Progressing     Problem: Discharge Planning  Goal: Discharge to home or other facility with appropriate resources  6/18/2024 0153 by Teresita Sanchez RN  Outcome: Progressing  6/17/2024 1735 by Agata Matias RN  Outcome: Progressing     Problem: Pain  Goal: Verbalizes/displays adequate comfort level or baseline comfort level  6/18/2024 0153 by Teresita Sanchez RN  Outcome: Progressing  6/17/2024 1735 by Agata Matias RN  Outcome: Progressing     Problem: Skin/Tissue Integrity  Goal: Absence of new skin breakdown  Description: 1.  Monitor for areas of redness and/or skin breakdown  2.  Assess vascular access sites hourly  3.  Every 4-6 hours minimum:  Change oxygen saturation probe site  4.  Every 4-6 hours:  If on nasal continuous positive airway pressure, respiratory therapy assess nares and determine need for appliance change or resting period.  6/18/2024 0153 by Teresita Sanchez RN  Outcome: Progressing  6/17/2024 1735 by Agata Matias RN  Outcome: Progressing     Problem: ABCDS Injury Assessment  Goal: Absence of physical injury  6/18/2024 0153 by Teresita Sanchez RN  Outcome: Progressing  6/17/2024 1735 by Agata Matias RN  Outcome: Progressing  Flowsheets (Taken 6/17/2024 0800)  Absence of Physical Injury: Implement safety measures based on patient assessment

## 2024-06-18 NOTE — CONSULTS
[facility]              [address] [place] [address]                              CONSULTATION      PATIENT NAME: LUCIO ORELLANA             : 1960  MED REC NO: [id number]                        ROOM: 6411  ACCOUNT NO: [id number]                       ADMIT DATE: 06/15/2024  PROVIDER: Jones Lynn MD    REHAB CONSULT      CHIEF COMPLAINT:  Intracranial bleed.    HISTORY OF PRESENT ILLNESS:  The patient had a stroke about 12 years ago with left hemiparesis.  He was functioning independently at home before this.  He had a fall at home and was brought to [facility] on 06/15/2024.  He was found on scan to have an intracranial hemorrhage in the right frontal area.  He was admitted and stabilized.  He was seen by Dr. Jackson, who did not feel that he warranted any neurosurgical intervention.  He is also currently in isolation for an MRSA and a multidrug-resistant enterobacter.  He has been evaluated by Therapy.  He is moderate assist for transfers, moderate assist to ambulate about 4 feet, and maximal assist for his ADLs, and I was asked to see him for planning for the rehab.    PAST MEDICAL HISTORY:    1. Prior stroke with left hemiparesis about 12 years ago.  2. Valvular heart disease.  The patient tells me that he is being planned for valve surgery later this year.  3. Type 2 diabetes mellitus.  4. Peripheral vascular disease with diabetic ulcer.  5. Hypertension.  6. Coronary artery disease with previous MI.    ALLERGIES:  PENICILLIN.      CURRENT MEDICATIONS:  Lipitor, Coreg, Lovenox, iron, folic acid, gabapentin, sliding scale insulin, Keppra, Protonix, phenobarb for alcohol use disorder, sodium chloride, and thiamine.    SOCIAL HISTORY:  The patient lives alone.    REVIEW OF SYSTEMS:  HEENT:  Negative for prior HEENT problems.  PULMONARY:  Negative for prior pulmonary problems.  CARDIAC:  Positive for coronary artery disease and I believe  for valvular heart disease based on the patient's information.  GI:  Negative.  :  Positive for incontinence.  ORTHOPEDIC:  Positive for deformity at the left ankle from the previous ulcers.  NEUROLOGIC:  Positive for prior stroke and for neuropathy.    PHYSICAL EXAMINATION:  GENERAL:  Obese white male, in no acute distress at rest.  HEAD:  Normocephalic, atraumatic.  LUNGS:  Clear to P and A.  HEART:  Regular rate and rhythm.  S1 and S2 normal.  There is a murmur present about grade 2.  ABDOMEN:  Bowel sounds normal.  Soft, nontender.  No masses.  EXTREMITIES:  There is deformity at the left foot and ankle from prior surgery.  MENTAL STATUS:  Alert and oriented.  SENSATION:  Diminished in both lowers.  NEUROMUSCULAR:  4/5 throughout on the right.  Left side is 4- with increased tone.    PROBLEM LIST:    1. Right frontal intracranial hemorrhage.  2. Cerebrovascular accident with left hemiparesis.  3. Diabetic neuropathy.  4. Type 2 diabetes mellitus.  5. Hypertension.  6. Valvular heart disease.  7. Multidrug-resistant infection.    RECOMMENDATIONS:  The patient is going to require further rehab in order to be safe at home.  I am not sure if we would be able to bring him to the rehab gym with his current infection, that is going to be a factor that we will need to get addressed further.  The other issue is that he does not seem to be very aware of his deficits.  He feels that he can simply return home on function exactly the way he did before this and he has no help at home and is completely unaware that he is not safe at this point, so we will look into the issue of the infection further.  If he cannot come to the rehab unit here, then we may consider [facility].          LISA JO MD      D:  06/17/2024 15:33:53     T:  06/17/2024 19:55:39     TAP/AQS  Job #:  [id number]     Doc#:  [id number]

## 2024-06-18 NOTE — ACP (ADVANCE CARE PLANNING)
Advance Care Planning   Healthcare Decision Maker:    Primary Decision Maker: Yimi Steel - Brother/Sister - [phone]      Today we documented Decision Maker(s) consistent with Legal Next of Kin hierarchy.

## 2024-06-18 NOTE — PROGRESS NOTES
[facility] Trauma Services.    Daily Progress Note 6/18/2024    Date of admission:  6/15/2024    CC: Fall    Subjective:    Doing well. GCS 15.     Objective:  /68   Pulse 60   Temp 98 °F (36.7 °C) (Oral)   Resp 18   Ht 1.803 m (5' 11\")   Wt 94.3 kg (208 lb)   SpO2 96%   BMI 29.01 kg/m²       PHYSICAL EXAM  General: No apparent distress, comfortable   HEENT: Trachea midline, no masses, Pupils equal round   Chest: Respiratory effort was normal with no retractions or use of accessory muscles.  Cardiovascular: Extremities warm, well perfused,   Abdomen:  Soft and non distended.  No tenderness, guarding, rebound, or rigidity   Extremities: Bandage on right upper extremity    Assessment/Plan:  63 y.o. male s/p fall with ICH stable on repeat    Principal Problem:    Intracranial hemorrhage (HCC)  Resolved Problems:    * No resolved hospital problems. *     - continue Keppra BID for total 7 days   - Continue with phenobarbital; monitor for signs of withdrawal, level to be drawn today   - hold anticoagulation   - encourage incentive spirometry   - hyponatremia, fluid restriction   - monitor glucose, SSI ordered   - PT/OT   - monitor Hb   - pt may or may not be able to go to ARU per isolation protocol, per PMR may need [place]   - discharge planning      Nina Zepeda,       Attending Attestation   I saw and examined the patient, I agree with resident note      Hx taken from patient and chart     64yo male with hx stroke with baseline L sided weakness s/p fall with acute ICH      Rehab planning       I am managing prescription drugs, keppra, home meds, insulin,       Francois Dominique MD FACS

## 2024-06-18 NOTE — CARE COORDINATION
Patient presented to [facility] from [facility]. He had a fall after losing his balance. H/O CVA.  Imaging showed intracranial hemorrhage Neuro surgery consulted and no intervention planned. ON oral Keppra. 2 x day x 7 days. Phenobarb to watch for signs of withdrawal.   PT 12/24  OT 15/24   Met with patient at bedside to discuss transition of care. Patient lives with his brother in a split level home. Pt used wheelchair primarily for mobility. Short distance ambulation completed with rollator. Pt also stated using cane to transfer to wheelchair.  Has a shower chair. He has a private HHA 3 x week that helps with shower.   His PCP is Genet Kraus and uses [facility] in [place]. Has H/O TALYA at [facility].Acute rehab consulted and denied.   He would like [facility] if accepted and referral made to [facility] liasion. He was agreeable for a referral to [facility].  Await acceptance at [facility] and if not accepted will await acceptance at [facility]. A TALYA list provided for additional choices. CM/SW will continue to follow  Case Management Assessment  Initial Evaluation    Date/Time of Evaluation: 6/18/2024 1:55 PM  Assessment Completed by: Chrystal Bañuelos RN    If patient is discharged prior to next notation, then this note serves as note for discharge by case management.    Patient Name: Jerome Steel                   YOB: 1960  Diagnosis: Intracranial hemorrhage (HCC) [I62.9]  Intracranial bleed (HCC) [I62.9]                   Date / Time: 6/15/2024  4:29 AM    Patient Admission Status: Inpatient   Readmission Risk (Low < 19, Mod (19-27), High > 27): Readmission Risk Score: 17.1    Current PCP: Genet Kraus, DO  PCP verified by CM? Yes    Chart Reviewed: Yes      History Provided by: Patient  Patient Orientation: Alert and Oriented, Person, Place, Situation, Self    Patient Cognition: Alert    Hospitalization in the last 30 days (Readmission):  No    If yes,  H/O CVA.  Imaging showed intracranial hemorrhage Neuro surgery consulted and no intervention planned. ON oral Keppra.  PT 12/24  OT 15/24   Met with patient at bedside to discuss transition of care. Patient lives with his brother in a split level home. Pt used wheelchair primarily for mobility. Short distance ambulation completed with rollator. Pt also stated using cane to transfer to wheelchair.  Has a shower chair. He has a private HHA 3 x week that helps with shower.   His PCP is Genet Kraus and uses [facility] in [place]. Has H/O TALYA at [facility].Acute rehab consulted and denied.   He would like [facility] if accepted and referral made to [facility] liasion. He was agreeable for a referral [facility].  Await acceptance at [facility] and if not accepted will await acceptance at [facility]. A TALYA list provided for additional choices. CM/SW will continue to follow  The Plan for Transition of Care is related to the following treatment goals of Intracranial hemorrhage (HCC) [I62.9]  Intracranial bleed (HCC) [I62.9]    IF APPLICABLE: The Patient and/or patient representative Jerome and his family were provided with a choice of provider and agrees with the discharge plan. Freedom of choice list with basic dialogue that supports the patient's individualized plan of care/goals and shares the quality data associated with the providers was provided to:     Patient Representative Name:       The Patient and/or Patient Representative Agree with the Discharge Plan?      Chrystal Bañuelos RN  Case Management Department  Ph:   [phone] Fax: [phone]

## 2024-06-19 LAB
ANION GAP SERPL CALCULATED.3IONS-SCNC: 10 MMOL/L (ref 7–16)
BASOPHILS # BLD: 0.02 K/UL (ref 0–0.2)
BASOPHILS NFR BLD: 0 % (ref 0–2)
BUN SERPL-MCNC: 14 MG/DL (ref 6–23)
CALCIUM SERPL-MCNC: 7.8 MG/DL (ref 8.6–10.2)
CHLORIDE SERPL-SCNC: 102 MMOL/L (ref 98–107)
CO2 SERPL-SCNC: 20 MMOL/L (ref 22–29)
CREAT SERPL-MCNC: 0.9 MG/DL (ref 0.7–1.2)
EOSINOPHIL # BLD: 0.2 K/UL (ref 0.05–0.5)
EOSINOPHILS RELATIVE PERCENT: 4 % (ref 0–6)
ERYTHROCYTE [DISTWIDTH] IN BLOOD BY AUTOMATED COUNT: 14.6 % (ref 11.5–15)
GFR, ESTIMATED: >90 ML/MIN/1.73M2
GLUCOSE BLD-MCNC: 132 MG/DL (ref 74–99)
GLUCOSE BLD-MCNC: 138 MG/DL (ref 74–99)
GLUCOSE BLD-MCNC: 148 MG/DL (ref 74–99)
GLUCOSE BLD-MCNC: 158 MG/DL (ref 74–99)
GLUCOSE SERPL-MCNC: 136 MG/DL (ref 74–99)
HCT VFR BLD AUTO: 28.5 % (ref 37–54)
HGB BLD-MCNC: 9.8 G/DL (ref 12.5–16.5)
IMM GRANULOCYTES # BLD AUTO: <0.03 K/UL (ref 0–0.58)
IMM GRANULOCYTES NFR BLD: 0 % (ref 0–5)
LYMPHOCYTES NFR BLD: 0.93 K/UL (ref 1.5–4)
LYMPHOCYTES RELATIVE PERCENT: 19 % (ref 20–42)
MCH RBC QN AUTO: 32.7 PG (ref 26–35)
MCHC RBC AUTO-ENTMCNC: 34.4 G/DL (ref 32–34.5)
MCV RBC AUTO: 95 FL (ref 80–99.9)
MONOCYTES NFR BLD: 0.59 K/UL (ref 0.1–0.95)
MONOCYTES NFR BLD: 12 % (ref 2–12)
NEUTROPHILS NFR BLD: 65 % (ref 43–80)
NEUTS SEG NFR BLD: 3.19 K/UL (ref 1.8–7.3)
PLATELET # BLD AUTO: 158 K/UL (ref 130–450)
PMV BLD AUTO: 9.6 FL (ref 7–12)
POTASSIUM SERPL-SCNC: 3.9 MMOL/L (ref 3.5–5)
RBC # BLD AUTO: 3 M/UL (ref 3.8–5.8)
SODIUM SERPL-SCNC: 132 MMOL/L (ref 132–146)
WBC OTHER # BLD: 4.9 K/UL (ref 4.5–11.5)

## 2024-06-19 PROCEDURE — 85025 COMPLETE CBC W/AUTO DIFF WBC: CPT

## 2024-06-19 PROCEDURE — 6370000000 HC RX 637 (ALT 250 FOR IP)

## 2024-06-19 PROCEDURE — 2140000000 HC CCU INTERMEDIATE R&B

## 2024-06-19 PROCEDURE — 2580000003 HC RX 258

## 2024-06-19 PROCEDURE — 82962 GLUCOSE BLOOD TEST: CPT

## 2024-06-19 PROCEDURE — 99232 SBSQ HOSP IP/OBS MODERATE 35: CPT | Performed by: SURGERY

## 2024-06-19 PROCEDURE — 6360000002 HC RX W HCPCS

## 2024-06-19 PROCEDURE — 36415 COLL VENOUS BLD VENIPUNCTURE: CPT

## 2024-06-19 PROCEDURE — 80048 BASIC METABOLIC PNL TOTAL CA: CPT

## 2024-06-19 RX ADMIN — TRAMADOL HYDROCHLORIDE 50 MG: 50 TABLET ORAL at 22:20

## 2024-06-19 RX ADMIN — Medication 100 MG: at 09:30

## 2024-06-19 RX ADMIN — FERROUS SULFATE TAB 325 MG (65 MG ELEMENTAL FE) 325 MG: 325 (65 FE) TAB at 09:30

## 2024-06-19 RX ADMIN — CARVEDILOL 3.12 MG: 3.12 TABLET, FILM COATED ORAL at 16:27

## 2024-06-19 RX ADMIN — SODIUM CHLORIDE, PRESERVATIVE FREE 10 ML: 5 INJECTION INTRAVENOUS at 09:30

## 2024-06-19 RX ADMIN — FOLIC ACID 1 MG: 1 TABLET ORAL at 09:30

## 2024-06-19 RX ADMIN — LEVETIRACETAM 500 MG: 500 TABLET, FILM COATED ORAL at 09:30

## 2024-06-19 RX ADMIN — Medication 5 MG: at 20:21

## 2024-06-19 RX ADMIN — LEVETIRACETAM 500 MG: 500 TABLET, FILM COATED ORAL at 20:21

## 2024-06-19 RX ADMIN — BACITRACIN ZINC: 500 OINTMENT TOPICAL at 14:21

## 2024-06-19 RX ADMIN — SENNOSIDES AND DOCUSATE SODIUM 1 TABLET: 50; 8.6 TABLET ORAL at 20:21

## 2024-06-19 RX ADMIN — BACITRACIN ZINC: 500 OINTMENT TOPICAL at 20:22

## 2024-06-19 RX ADMIN — TRAMADOL HYDROCHLORIDE 50 MG: 50 TABLET ORAL at 14:20

## 2024-06-19 RX ADMIN — PANTOPRAZOLE SODIUM 40 MG: 40 TABLET, DELAYED RELEASE ORAL at 09:30

## 2024-06-19 RX ADMIN — SODIUM CHLORIDE, PRESERVATIVE FREE 10 ML: 5 INJECTION INTRAVENOUS at 20:21

## 2024-06-19 RX ADMIN — SODIUM CHLORIDE 1 G: 1 TABLET ORAL at 16:27

## 2024-06-19 RX ADMIN — PHENOBARBITAL 64.8 MG: 32.4 TABLET ORAL at 16:27

## 2024-06-19 RX ADMIN — PHENOBARBITAL 64.8 MG: 32.4 TABLET ORAL at 05:52

## 2024-06-19 RX ADMIN — ENOXAPARIN SODIUM 30 MG: 100 INJECTION SUBCUTANEOUS at 20:21

## 2024-06-19 RX ADMIN — SODIUM CHLORIDE 1 G: 1 TABLET ORAL at 12:15

## 2024-06-19 RX ADMIN — PHENOBARBITAL 64.8 MG: 32.4 TABLET ORAL at 12:15

## 2024-06-19 RX ADMIN — BACITRACIN ZINC: 500 OINTMENT TOPICAL at 09:31

## 2024-06-19 RX ADMIN — GABAPENTIN 300 MG: 300 CAPSULE ORAL at 20:21

## 2024-06-19 RX ADMIN — TRAMADOL HYDROCHLORIDE 50 MG: 50 TABLET ORAL at 05:51

## 2024-06-19 RX ADMIN — MULTIVITAMIN TABLET 1 TABLET: TABLET at 09:30

## 2024-06-19 RX ADMIN — GABAPENTIN 300 MG: 300 CAPSULE ORAL at 09:30

## 2024-06-19 RX ADMIN — ENOXAPARIN SODIUM 30 MG: 100 INJECTION SUBCUTANEOUS at 09:30

## 2024-06-19 RX ADMIN — SODIUM CHLORIDE 1 G: 1 TABLET ORAL at 09:30

## 2024-06-19 RX ADMIN — CARVEDILOL 3.12 MG: 3.12 TABLET, FILM COATED ORAL at 09:30

## 2024-06-19 RX ADMIN — ATORVASTATIN CALCIUM 80 MG: 80 TABLET, FILM COATED ORAL at 09:30

## 2024-06-19 ASSESSMENT — PAIN DESCRIPTION - LOCATION
LOCATION: GENERALIZED
LOCATION: ARM;HIP;SHOULDER
LOCATION: GENERALIZED

## 2024-06-19 ASSESSMENT — PAIN DESCRIPTION - DESCRIPTORS
DESCRIPTORS: ACHING;CRAMPING;DISCOMFORT
DESCRIPTORS: ACHING;DISCOMFORT;SORE
DESCRIPTORS: ACHING;DISCOMFORT

## 2024-06-19 ASSESSMENT — PAIN SCALES - GENERAL
PAINLEVEL_OUTOF10: 4
PAINLEVEL_OUTOF10: 9
PAINLEVEL_OUTOF10: 0

## 2024-06-19 ASSESSMENT — PAIN DESCRIPTION - ORIENTATION
ORIENTATION: LEFT
ORIENTATION: LEFT

## 2024-06-19 NOTE — PROGRESS NOTES
[facility] Trauma Services.    Daily Progress Note 6/19/2024    Date of admission:  6/15/2024    CC: Fall    Subjective:  Pt states he is doing well this morning. Denies any issues overnight. Wants to leave the hospital. But considering rehab options.     Objective:  BP (!) 90/56   Pulse 60   Temp 98.4 °F (36.9 °C) (Oral)   Resp 17   Ht 1.803 m (5' 11\")   Wt 94.3 kg (208 lb)   SpO2 97%   BMI 29.01 kg/m²       PHYSICAL EXAM  General: No apparent distress, comfortable   HEENT: Trachea midline, no masses, Pupils equal round   Chest: No increased work of breathing on room air  Cardiovascular: RR and borderline hypotensive  Abdomen:  Soft, nontender, and non distended   Extremities: Bandage on right upper extremity    Assessment/Plan:  63 y.o. male s/p fall with ICH stable on repeat    Principal Problem:    Intracranial hemorrhage (HCC)  Resolved Problems:    * No resolved hospital problems. *     - continue Keppra BID for total 7 days   - Continue with same dose of phenobarbital, level subtherapeutic; monitor for signs of withdrawal   - hold anticoagulation   - encourage incentive spirometry   - hyponatremia, fluid restriction, improving   - monitor glucose, SSI   - PT/OT   - monitor Hb   - DC when placed      Rock Ortiz MD      Attending Attestation   I saw and examined the patient, I agree with resident note      Hx taken from patient and chart    62yo male with hx stroke with baseline L sided weakness s/p fall with acute ICH      Rehab planning       I am managing prescription drugs, keppra, home meds, insulin,        Francois Dominique MD FACS

## 2024-06-20 VITALS
RESPIRATION RATE: 16 BRPM | SYSTOLIC BLOOD PRESSURE: 125 MMHG | TEMPERATURE: 98.4 F | DIASTOLIC BLOOD PRESSURE: 58 MMHG | BODY MASS INDEX: 29.12 KG/M2 | OXYGEN SATURATION: 94 % | HEART RATE: 64 BPM | WEIGHT: 208 LBS | HEIGHT: 71 IN

## 2024-06-20 LAB
ANION GAP SERPL CALCULATED.3IONS-SCNC: 9 MMOL/L (ref 7–16)
BASOPHILS # BLD: 0.03 K/UL (ref 0–0.2)
BASOPHILS NFR BLD: 0 % (ref 0–2)
BUN SERPL-MCNC: 13 MG/DL (ref 6–23)
CALCIUM SERPL-MCNC: 8.3 MG/DL (ref 8.6–10.2)
CHLORIDE SERPL-SCNC: 104 MMOL/L (ref 98–107)
CO2 SERPL-SCNC: 22 MMOL/L (ref 22–29)
CREAT SERPL-MCNC: 0.7 MG/DL (ref 0.7–1.2)
EOSINOPHIL # BLD: 0.21 K/UL (ref 0.05–0.5)
EOSINOPHILS RELATIVE PERCENT: 3 % (ref 0–6)
ERYTHROCYTE [DISTWIDTH] IN BLOOD BY AUTOMATED COUNT: 14.5 % (ref 11.5–15)
GFR, ESTIMATED: >90 ML/MIN/1.73M2
GLUCOSE BLD-MCNC: 131 MG/DL (ref 74–99)
GLUCOSE BLD-MCNC: 139 MG/DL (ref 74–99)
GLUCOSE SERPL-MCNC: 115 MG/DL (ref 74–99)
HCT VFR BLD AUTO: 32.6 % (ref 37–54)
HGB BLD-MCNC: 11.2 G/DL (ref 12.5–16.5)
IMM GRANULOCYTES # BLD AUTO: 0.03 K/UL (ref 0–0.58)
IMM GRANULOCYTES NFR BLD: 0 % (ref 0–5)
LYMPHOCYTES NFR BLD: 0.96 K/UL (ref 1.5–4)
LYMPHOCYTES RELATIVE PERCENT: 13 % (ref 20–42)
MCH RBC QN AUTO: 33.5 PG (ref 26–35)
MCHC RBC AUTO-ENTMCNC: 34.4 G/DL (ref 32–34.5)
MCV RBC AUTO: 97.6 FL (ref 80–99.9)
MONOCYTES NFR BLD: 0.62 K/UL (ref 0.1–0.95)
MONOCYTES NFR BLD: 9 % (ref 2–12)
NEUTROPHILS NFR BLD: 74 % (ref 43–80)
NEUTS SEG NFR BLD: 5.32 K/UL (ref 1.8–7.3)
PLATELET # BLD AUTO: 185 K/UL (ref 130–450)
PMV BLD AUTO: 9.8 FL (ref 7–12)
POTASSIUM SERPL-SCNC: 3.7 MMOL/L (ref 3.5–5)
RBC # BLD AUTO: 3.34 M/UL (ref 3.8–5.8)
SODIUM SERPL-SCNC: 135 MMOL/L (ref 132–146)
WBC OTHER # BLD: 7.2 K/UL (ref 4.5–11.5)

## 2024-06-20 PROCEDURE — 82962 GLUCOSE BLOOD TEST: CPT

## 2024-06-20 PROCEDURE — 36415 COLL VENOUS BLD VENIPUNCTURE: CPT

## 2024-06-20 PROCEDURE — 6360000002 HC RX W HCPCS

## 2024-06-20 PROCEDURE — 80048 BASIC METABOLIC PNL TOTAL CA: CPT

## 2024-06-20 PROCEDURE — 6370000000 HC RX 637 (ALT 250 FOR IP)

## 2024-06-20 PROCEDURE — 99232 SBSQ HOSP IP/OBS MODERATE 35: CPT | Performed by: SURGERY

## 2024-06-20 PROCEDURE — 2580000003 HC RX 258

## 2024-06-20 PROCEDURE — 85025 COMPLETE CBC W/AUTO DIFF WBC: CPT

## 2024-06-20 RX ORDER — BACITRACIN ZINC 500 [USP'U]/G
OINTMENT TOPICAL
Qty: 30 G | Refills: 1 | OUTPATIENT
Start: 2024-06-20 | End: 2024-06-30

## 2024-06-20 RX ORDER — LEVETIRACETAM 500 MG/1
500 TABLET ORAL 2 TIMES DAILY
Qty: 4 TABLET | Refills: 0 | OUTPATIENT
Start: 2024-06-20 | End: 2024-06-22

## 2024-06-20 RX ADMIN — SODIUM CHLORIDE 1 G: 1 TABLET ORAL at 08:49

## 2024-06-20 RX ADMIN — SODIUM CHLORIDE 1 G: 1 TABLET ORAL at 11:41

## 2024-06-20 RX ADMIN — TRAMADOL HYDROCHLORIDE 50 MG: 50 TABLET ORAL at 06:33

## 2024-06-20 RX ADMIN — ENOXAPARIN SODIUM 30 MG: 100 INJECTION SUBCUTANEOUS at 08:49

## 2024-06-20 RX ADMIN — POTASSIUM BICARBONATE 40 MEQ: 782 TABLET, EFFERVESCENT ORAL at 08:49

## 2024-06-20 RX ADMIN — PHENOBARBITAL 64.8 MG: 32.4 TABLET ORAL at 00:51

## 2024-06-20 RX ADMIN — CARVEDILOL 3.12 MG: 3.12 TABLET, FILM COATED ORAL at 08:49

## 2024-06-20 RX ADMIN — ATORVASTATIN CALCIUM 80 MG: 80 TABLET, FILM COATED ORAL at 08:49

## 2024-06-20 RX ADMIN — Medication 100 MG: at 08:52

## 2024-06-20 RX ADMIN — PANTOPRAZOLE SODIUM 40 MG: 40 TABLET, DELAYED RELEASE ORAL at 08:49

## 2024-06-20 RX ADMIN — BACITRACIN ZINC: 500 OINTMENT TOPICAL at 13:36

## 2024-06-20 RX ADMIN — PHENOBARBITAL 64.8 MG: 32.4 TABLET ORAL at 11:41

## 2024-06-20 RX ADMIN — GABAPENTIN 300 MG: 300 CAPSULE ORAL at 08:49

## 2024-06-20 RX ADMIN — BACITRACIN ZINC: 500 OINTMENT TOPICAL at 08:50

## 2024-06-20 RX ADMIN — SENNOSIDES AND DOCUSATE SODIUM 1 TABLET: 50; 8.6 TABLET ORAL at 08:49

## 2024-06-20 RX ADMIN — FOLIC ACID 1 MG: 1 TABLET ORAL at 08:49

## 2024-06-20 RX ADMIN — PHENOBARBITAL 64.8 MG: 32.4 TABLET ORAL at 06:10

## 2024-06-20 RX ADMIN — POLYETHYLENE GLYCOL 3350 17 G: 17 POWDER, FOR SOLUTION ORAL at 08:50

## 2024-06-20 RX ADMIN — MULTIVITAMIN TABLET 1 TABLET: TABLET at 08:52

## 2024-06-20 RX ADMIN — SODIUM CHLORIDE, PRESERVATIVE FREE 10 ML: 5 INJECTION INTRAVENOUS at 08:54

## 2024-06-20 RX ADMIN — LEVETIRACETAM 500 MG: 500 TABLET, FILM COATED ORAL at 08:49

## 2024-06-20 RX ADMIN — FERROUS SULFATE TAB 325 MG (65 MG ELEMENTAL FE) 325 MG: 325 (65 FE) TAB at 08:57

## 2024-06-20 RX ADMIN — TRAMADOL HYDROCHLORIDE 50 MG: 50 TABLET ORAL at 15:00

## 2024-06-20 ASSESSMENT — PAIN SCALES - GENERAL
PAINLEVEL_OUTOF10: 9
PAINLEVEL_OUTOF10: 7

## 2024-06-20 ASSESSMENT — PAIN - FUNCTIONAL ASSESSMENT: PAIN_FUNCTIONAL_ASSESSMENT: ACTIVITIES ARE NOT PREVENTED

## 2024-06-20 ASSESSMENT — PAIN DESCRIPTION - LOCATION
LOCATION: ARM;SHOULDER;HIP
LOCATION: BACK

## 2024-06-20 ASSESSMENT — PAIN DESCRIPTION - DESCRIPTORS
DESCRIPTORS: ACHING;DISCOMFORT;THROBBING
DESCRIPTORS: ACHING;TENDER;DISCOMFORT

## 2024-06-20 ASSESSMENT — PAIN DESCRIPTION - ORIENTATION
ORIENTATION: LEFT
ORIENTATION: MID

## 2024-06-20 NOTE — PLAN OF CARE
Problem: Safety - Adult  Goal: Free from fall injury  Outcome: Progressing     Problem: Chronic Conditions and Co-morbidities  Goal: Patient's chronic conditions and co-morbidity symptoms are monitored and maintained or improved  Outcome: Progressing  Flowsheets (Taken 6/20/2024 0834 by Mona Ly, RN)  Care Plan - Patient's Chronic Conditions and Co-Morbidity Symptoms are Monitored and Maintained or Improved: Monitor and assess patient's chronic conditions and comorbid symptoms for stability, deterioration, or improvement     Problem: Discharge Planning  Goal: Discharge to home or other facility with appropriate resources  Outcome: Progressing  Flowsheets (Taken 6/20/2024 0834 by Mona Ly, RN)  Discharge to home or other facility with appropriate resources: Identify barriers to discharge with patient and caregiver     Problem: Pain  Goal: Verbalizes/displays adequate comfort level or baseline comfort level  Outcome: Progressing  Flowsheets (Taken 6/20/2024 0834 by Mona Ly, RN)  Verbalizes/displays adequate comfort level or baseline comfort level: Encourage patient to monitor pain and request assistance     Problem: Skin/Tissue Integrity  Goal: Absence of new skin breakdown  Description: 1.  Monitor for areas of redness and/or skin breakdown  2.  Assess vascular access sites hourly  3.  Every 4-6 hours minimum:  Change oxygen saturation probe site  4.  Every 4-6 hours:  If on nasal continuous positive airway pressure, respiratory therapy assess nares and determine need for appliance change or resting period.  Outcome: Progressing     Problem: ABCDS Injury Assessment  Goal: Absence of physical injury  Outcome: Progressing

## 2024-06-20 NOTE — PLAN OF CARE
Problem: Safety - Adult  Goal: Free from fall injury  6/20/2024 1227 by Mona Ly, RN  Outcome: Progressing  6/20/2024 1221 by Mariana Sweeney RN  Outcome: Progressing  Flowsheets (Taken 6/20/2024 0834 by Mona Ly, RN)  Free From Fall Injury: Instruct family/caregiver on patient safety     Problem: Discharge Planning  Goal: Discharge to home or other facility with appropriate resources  6/20/2024 1227 by Mona Ly, RN  Outcome: Progressing

## 2024-06-20 NOTE — PROGRESS NOTES
Nurse to nurse report called to [facility], Discharge paperwor, KELLY and Mar report sent with patient.

## 2024-06-20 NOTE — CARE COORDINATION
Met with patient at bedside to provide update regarding acceptance at [facility]. Patient upset, states he wants to go to [facility] instead, has been there before and CM must have gotten what he said confused. Call made to My at [facility], bed still available and they can accept patient today. Ambulance transport set up for 2:30p via [facility]. Patient, nurse and liaison informed. Perfect serve message sent to attending to check discharge. Ambulance form, KELLY and 7000 completed; envelope placed on the soft chart.    The Plan for Transition of Care is related to the following treatment goals: discharge planning    The Patient and/or patient representative Jerome Steel was provided with a choice of provider and agrees   with the discharge plan. [x] Yes [] No    Freedom of choice list was provided with basic dialogue that supports the patient's individualized plan of care/goals, treatment preferences and shares the quality data associated with the providers. [x] Yes [] No     Electronically signed by MIKKI Mace on 6/20/2024 at 10:41 AM

## 2024-06-20 NOTE — PLAN OF CARE
Problem: Safety - Adult  Goal: Free from fall injury  Outcome: Progressing     Problem: Chronic Conditions and Co-morbidities  Goal: Patient's chronic conditions and co-morbidity symptoms are monitored and maintained or improved  Outcome: Progressing  Flowsheets (Taken 6/19/2024 2019)  Care Plan - Patient's Chronic Conditions and Co-Morbidity Symptoms are Monitored and Maintained or Improved: Monitor and assess patient's chronic conditions and comorbid symptoms for stability, deterioration, or improvement     Problem: Discharge Planning  Goal: Discharge to home or other facility with appropriate resources  Outcome: Progressing  Flowsheets (Taken 6/19/2024 2019)  Discharge to home or other facility with appropriate resources: Identify barriers to discharge with patient and caregiver     Problem: Pain  Goal: Verbalizes/displays adequate comfort level or baseline comfort level  Outcome: Progressing     Problem: Skin/Tissue Integrity  Goal: Absence of new skin breakdown  Description: 1.  Monitor for areas of redness and/or skin breakdown  2.  Assess vascular access sites hourly  3.  Every 4-6 hours minimum:  Change oxygen saturation probe site  4.  Every 4-6 hours:  If on nasal continuous positive airway pressure, respiratory therapy assess nares and determine need for appliance change or resting period.  Outcome: Progressing     Problem: ABCDS Injury Assessment  Goal: Absence of physical injury  Outcome: Progressing

## 2024-06-20 NOTE — PROGRESS NOTES
[facility].    Daily Progress Note 6/20/2024    Date of admission:  6/15/2024    CC: Fall    Subjective:  Pt states he is doing well this morning. Denies any issues overnight.    Objective:  /63   Pulse 61   Temp 98 °F (36.7 °C) (Temporal)   Resp 18   Ht 1.803 m (5' 11\")   Wt 94.3 kg (208 lb)   SpO2 98%   BMI 29.01 kg/m²       PHYSICAL EXAM  General: No apparent distress, comfortable   HEENT: Trachea midline, no masses, Pupils equal round   Chest: No increased work of breathing on room air  Cardiovascular: RR and borderline hypotensive  Abdomen:  Soft, nontender, and non distended   Extremities: Bandage on right upper extremity    Assessment/Plan:  63 y.o. male s/p fall with ICH stable on repeat    Principal Problem:    Intracranial hemorrhage (HCC)  Resolved Problems:    * No resolved hospital problems. *     - continue Keppra BID for total 7 days   - Continue with same dose of phenobarbital, level subtherapeutic; monitor for signs of withdrawal   - hold anticoagulation   - encourage incentive spirometry   - hyponatremia, fluid restriction, improving   - monitor glucose, SSI   - PT/OT   - monitor Hb   - DC when placed      Oscar Jay, DO      Attending Attestation   I saw and examined the patient, I agree with resident note      Hx taken from patient and chart     64yo male with hx stroke with baseline L sided weakness s/p fall with acute ICH      Rehab planning       I am managing prescription drugs, keppra, home meds, insulin,       Francois Dominique MD FACS

## 2024-06-20 NOTE — DISCHARGE INSTR - COC
Continuity of Care Form    Patient Name: Jerome Steel   :  1960  MRN:  [id number]    Admit date:  6/15/2024  Discharge date:  ***    Code Status Order: Full Code   Advance Directives:     Admitting Physician:  Francois Dominique MD  PCP: Genet Karus DO    Discharging Nurse: ***  Discharging Hospital Unit/Room#: 6411/6411-B  Discharging Unit Phone Number: ***    Emergency Contact:   Extended Emergency Contact Information  Primary Emergency Contact: Yimi Steel  Home Phone: [phone]  Mobile Phone: [phone]  Relation: Brother/Sister   needed? No  Secondary Emergency Contact: miguelina julian  Home Phone: [phone]  Mobile Phone: [phone]  Relation: Other  Preferred language: English   needed? No    Past Surgical History:  Past Surgical History:   Procedure Laterality Date    CARPAL TUNNEL RELEASE  10/01/2016    Dr. Chang    FINGER AMPUTATION      FOOT DEBRIDEMENT Left 2021    LEFT FOOT DEBRIDEMENT WITH BONE BIOPSY, WOUND VAC APPLICATION performed by Rober Harrell DPM at [facility] OR    FOOT DEBRIDEMENT Left 2022    LEFT FOOT DEBRIDEMENT INCISION AND DRAINAGE performed by Rober Harrell DPM at [facility] OR    FOOT DEBRIDEMENT Left 2022    LEFT FOOT PARTIAL CALCANECTOMY AND DEBRIDEMENT performed by Rober Harrell DPM at [facility] OR    KNEE ARTHROSCOPY      TONSILLECTOMY  1969    TRANSESOPHAGEAL ECHOCARDIOGRAM N/A 2021    TRANSESOPHAGEAL ECHOCARDIOGRAM WITH BUBBLE STUDY performed by Ana Nielsen MD at [facility] ENDOSCOPY    UPPER GASTROINTESTINAL ENDOSCOPY N/A 2021    EGD BIOPSY performed by Oscar Stephens DO at [facility] ENDOSCOPY       Immunization History:   Immunization History   Administered Date(s) Administered    COVID-19, J&J, (age 18y+), IM, 0.5 mL 2021    TDaP, ADACEL (age 10y-64y), BOOSTRIX (age 10y+), IM, 0.5mL 06/15/2024       Active Problems:  Patient Active Problem List   Diagnosis Code    CVA (cerebral vascular accident) (HCC) I63.9    Hypertension

## 2024-06-21 ENCOUNTER — TELEPHONE (OUTPATIENT)
Dept: NEUROSURGERY | Age: 64
End: 2024-06-21

## 2024-06-21 NOTE — TELEPHONE ENCOUNTER
Patient has a brain bleed and will need to stop the aspirin now. He is not to have any anticoagulation/antiplatelet agents or NSAIDs. He will return to our office in 4 weeks with a repeat head CT scan

## 2024-06-21 NOTE — TELEPHONE ENCOUNTER
Patient saw Dr. Jackson while admitted to [facility] on 6/15. Following his d/c he was transferred to [facility] in [place]. LYNETTE Boone called and asked about the patients medication as he has a brain bleed and is currently on Asprin. She also asked for Dr. Jackson's consult notes, which I will fax over. Please advise.

## 2024-07-08 DIAGNOSIS — I10 PRIMARY HYPERTENSION: Chronic | ICD-10-CM

## 2024-07-08 DIAGNOSIS — I65.23 BILATERAL CAROTID ARTERY STENOSIS: ICD-10-CM

## 2024-07-08 DIAGNOSIS — I25.10 CORONARY ARTERY DISEASE INVOLVING NATIVE CORONARY ARTERY OF NATIVE HEART WITHOUT ANGINA PECTORIS: ICD-10-CM

## 2024-07-08 DIAGNOSIS — E11.9 DIABETES MELLITUS TYPE II, NON INSULIN DEPENDENT (MULTI): ICD-10-CM

## 2024-07-08 DIAGNOSIS — D64.9 ANEMIA, UNSPECIFIED TYPE: ICD-10-CM

## 2024-07-08 DIAGNOSIS — D50.8 IRON DEFICIENCY ANEMIA SECONDARY TO INADEQUATE DIETARY IRON INTAKE: ICD-10-CM

## 2024-07-08 DIAGNOSIS — I21.4 NSTEMI (NON-ST ELEVATED MYOCARDIAL INFARCTION) (MULTI): ICD-10-CM

## 2024-07-08 DIAGNOSIS — I35.9 AORTIC VALVE DISEASE: ICD-10-CM

## 2024-07-08 DIAGNOSIS — E78.5 HYPERLIPIDEMIA, UNSPECIFIED HYPERLIPIDEMIA TYPE: Chronic | ICD-10-CM

## 2024-07-08 DIAGNOSIS — E78.00 PURE HYPERCHOLESTEROLEMIA: Chronic | ICD-10-CM

## 2024-07-08 RX ORDER — LOSARTAN POTASSIUM 50 MG/1
100 TABLET ORAL DAILY
Qty: 90 TABLET | Refills: 1 | Status: SHIPPED | OUTPATIENT
Start: 2024-07-08

## 2024-07-10 DIAGNOSIS — M54.50 CHRONIC BILATERAL LOW BACK PAIN WITHOUT SCIATICA: ICD-10-CM

## 2024-07-10 DIAGNOSIS — G89.29 CHRONIC BILATERAL LOW BACK PAIN WITHOUT SCIATICA: ICD-10-CM

## 2024-07-10 RX ORDER — TRAMADOL HYDROCHLORIDE 50 MG/1
50 TABLET ORAL EVERY 8 HOURS PRN
Qty: 90 TABLET | Refills: 0 | Status: SHIPPED | OUTPATIENT
Start: 2024-07-10 | End: 2024-08-09

## 2024-07-15 RX ORDER — FOLIC ACID 1 MG/1
1000 TABLET ORAL DAILY
Qty: 30 TABLET | Refills: 5 | Status: SHIPPED
Start: 2024-07-15 | End: 2024-07-17 | Stop reason: SDUPTHER

## 2024-07-17 DIAGNOSIS — I63.9 CEREBROVASCULAR ACCIDENT (CVA), UNSPECIFIED MECHANISM (HCC): ICD-10-CM

## 2024-07-17 RX ORDER — CLOPIDOGREL BISULFATE 75 MG/1
75 TABLET ORAL DAILY
Qty: 90 TABLET | Refills: 1 | Status: SHIPPED | OUTPATIENT
Start: 2024-07-17 | End: 2025-07-12

## 2024-07-17 RX ORDER — FOLIC ACID 1 MG/1
1000 TABLET ORAL DAILY
Qty: 30 TABLET | Refills: 5 | Status: SHIPPED | OUTPATIENT
Start: 2024-07-17

## 2024-07-17 NOTE — TELEPHONE ENCOUNTER
Last Appointment   5/10/2024  Next Appointment  8/15/2024    Pt had to switch pharmacies, [facility] no longer filling meds

## 2024-07-22 RX ORDER — FERROUS SULFATE 325(65) MG
325 TABLET ORAL
Qty: 90 TABLET | Refills: 1 | Status: SHIPPED | OUTPATIENT
Start: 2024-07-22

## 2024-07-28 NOTE — PROGRESS NOTES
[facility]    Patient Name: Santana Mayo  Patient : 1960      Scribe Attestation  By signing my name below, [employer] Scribe   attest that this documentation has been prepared under the direction and in the presence of Umesh Newby DO.      Reason for visit:  This is a 63-year-old male here for follow-up regarding coronary artery disease with cardiac catheterization done in 2023 showing  of the RCA with severe disease of the left main, hypertension, dyslipidemia, diabetes mellitus, anemia, mild aortic valve stenosis, carotid artery disease.    HPI:  This is a 63-year-old male here for follow-up regarding coronary artery disease with cardiac catheterization done in 2023 showing  of the RCA with severe disease of the left main, hypertension, dyslipidemia, diabetes mellitus, anemia, mild aortic valve stenosis, carotid artery disease.  The patient was last evaluated by me in 2024.  At that visit I increased losartan to 100 mg daily, ordered blood work including CMP/lipid/magnesium/CBC, and asked the patient to follow-up in 6 months.  Patient was evaluated by CT surgery in 2023 at which time we discussed about proceeding with bypass surgery although the patient has not had any further follow-up. He had an outside hospitalization in 2024 due to mechanical fall that led to a subarachnoid hemorrhage. Outside CBC done in 2024 showed a hemoglobin of 11.2. Outside CMP done in 2024 showed normal serum sodium and potassium with a serum creatinine of 0.7. ECG done today showed sinus rhythm with a heart rate of 72 bpm.  The patient reports that he has been feeling generally well from the cardiac standpoint. He denies any new chest pain, shortness of breath, palpitations and lightheadedness. He states that he takes all of his medications as prescribed. During my exam, he was resting comfortably on the exam table.             Assessment/Plan:   Coronary artery disease  The has a history of coronary artery disease with cardiac catheterization done in October 2023 showing  of the RCA with severe disease of the left main.  ECG done today showed sinus rhythm with a heart rate of 72 bpm.    He denies anginal chest discomfort.  Blood pressure appears controlled on exam today.  He should continue current antihypertensive medications and antiplatelet therapy.  Echocardiogram done 10/3/2023 showed an ejection fraction of 50%.   Lipid panel done in January 2023 showed an LDL of 96 and triglycerides of 121.   He is currently on atorvastatin 80 mg daily.  Recent lab works as noted in the HPI.   Lab works were ordered as noted below.   Please see lifestyle recommendations below.  Discussed with him his risk for coronary vascular events as well as the risks and benefits of bypass surgery. I strongly suggested referring him back to CT surgery for further evaluation and management. He expressed understanding and declined surgical intervention at this time.  Patient should restart aspirin therapy 1 felt safe to do so by his neurology provider.  Follow up in 3 months and sooner if necessary.      2. Hypertension  The patient has a history of hypertension which appears controlled on exam today.  He should continue his current antihypertensive medications and monitor his blood pressure at home.     3. Dyslipidemia  Lipid panel done in January 2023 showed an LDL of 96 and triglycerides of 121.   He is currently on atorvastatin 80 mg daily.  Lipid panel was ordered as noted below.  Please see lifestyle recommendations below.     4. Diabetes mellitus  Stable. Medication management as per PCP.     5. Anemia  Outside CBC done in June 2024 showed a hemoglobin of 11.2  Management as per PCP.     6. Aortic valve stenosis  The patient had a history of mild aortic valve stenosis seen on echocardiogram done in January 2023.  I will continue to monitor  this clinically for now.      7. Carotid artery disease  Carotid ultrasound done in December 2023 showed findings consistent with 50 to 69% stenosis of the right proximal internal carotid artery and less than 50% stenosis of the left proximal internal carotid artery.   Continue risk factor modification.    8.  History of subarachnoid hemorrhage  Patient had a hospitalization in June 2024 after a fall which resulted in a subarachnoid hemorrhage.  Antiplatelet therapy were stopped at that time including aspirin and clopidogrel.  Patient was asked to restart aspirin therapy when felt safe to do so by his neurology provider.         Orders:   CMP/lipid/magnesium  Suggested referral back to CT surgery (Dr. Bourgeois) -patient declined at this time.  Follow-up in 3 months.    Lifestyle Recommendations  I recommend a whole-food plant-based diet, an eating pattern that encourages the consumption of unrefined plant foods (such as fruits, vegetables, tubers, whole grains, legumes, nuts and seeds) and discourages meats, dairy products, eggs and processed foods.     The AHA/ACC recommends that the patient consume a dietary pattern that emphasizes intake of vegetables, fruits, and whole grains; includes low-fat dairy products, poultry, fish, legumes, non-tropical vegetable oils, and nuts; and limits intake of sodium, sweets, sugar-sweetened beverages, and red meats.  Adapt this dietary pattern to appropriate calorie requirements (a 500-750 kcal/day deficit to loose weight), personal and cultural food preferences, and nutrition therapy for other medical conditions (including diabetes).  Achieve this pattern by following plans such as the [place] Mediterranean, DASH dietary pattern, or AHA diet.     Engage in 2 hours and 30 minutes per week of moderate-intensity physical activity, or 1 hour and 15 minutes (75 minutes) per week of vigorous-intensity aerobic physical activity, or an equivalent combination of moderate and  vigorous-intensity aerobic physical activity. Aerobic activity should be performed in episodes of at least 10 minutes preferably spread throughout the week.     Adhering to a heart healthy diet, regular exercise habits, avoidance of tobacco products, and maintenance of a healthy weight are crucial components of their heart disease risk reduction.     Any positive review of systems not specifically addressed in the office visit today should be evaluated and treated by the patients primary care physician or in an emergency department if necessary     Patient was notified that results from ordered tests will be called to the patient if it changes current management; it will otherwise be discussed at a future appointment and available on  [employer].     Thank you for allowing me to participate in the care of this patient.        This document was generated using the assistance of voice recognition software. If there are any errors of spelling, grammar, syntax, or meaning; please feel free to contact me directly for clarification.    Past Medical History:  He has a past medical history of CVA (cerebral vascular accident) (Multi), DM (diabetes mellitus) (Multi), HLD (hyperlipidemia), and HTN (hypertension).    Past Surgical History:  He has a past surgical history that includes CT angio abdomen pelvis w and or wo IV IV contrast (1/30/2023) and Cardiac catheterization (N/A, 10/2/2023).      Social History:  He reports that he has quit smoking. His smoking use included cigarettes. He has never used smokeless tobacco. He reports current alcohol use of about 6.0 standard drinks of alcohol per week. He reports that he does not use drugs.    Family History:  No family history on file.     Allergies:  Penicillins and Penicillin v    Outpatient Medications:  Current Outpatient Medications   Medication Instructions    acetaminophen (Tylenol) 325 mg tablet 2 tablets, oral, Every 4 hours PRN    aspirin 81 mg, oral, Daily     "atorvastatin (LIPITOR) 80 mg, oral, Daily    B complex-C-E-FA-Zn-lysine tablet 1 tablet, oral, Daily    carvedilol (COREG) 3.125 mg, oral, 2 times daily (morning and late afternoon)    clopidogrel (PLAVIX) 75 mg, oral, Daily    collagenase (SantyL) 250 unit/gram ointment     cyanocobalamin (VITAMIN B-12) 1,000 mcg, oral, Daily    cyclobenzaprine (FLEXERIL) 5 mg, oral, Nightly    empagliflozin (Jardiance) 10 mg 1 tablet, oral, Daily    ergocalciferol (VITAMIN D-2) 50,000 Units, oral, Once Weekly    FeroSuL tablet 1 tablet, oral, Daily, WITH A MEAL    folic acid (FOLVITE) 1 mg, oral, Daily    gabapentin (NEURONTIN) 100 mg, oral, 3 times daily    losartan (COZAAR) 100 mg, oral, Daily    melatonin 9 mg, oral, Nightly    metFORMIN (GLUCOPHAGE) 500 mg, oral, 2 times daily (morning and late afternoon)    pantoprazole (PROTONIX) 40 mg, oral, Daily before breakfast    Vitamin C 500 mg, oral, Daily        ROS:  A 14 point review of systems was done and is negative other than as stated in HPI    Vitals:      10/3/2023    11:22 PM 10/4/2023     2:49 AM 10/4/2023     6:00 AM 10/4/2023     8:40 AM 10/10/2023    11:34 AM 11/3/2023     1:19 PM 1/16/2024    10:56 AM   Vitals   Systolic 133 135  127 100 186 148   Diastolic 74 78  60 78 74 68   Heart Rate 64 66  68 63 75 62   Temp 36.3 °C (97.3 °F) 36.6 °C (97.9 °F)  36.4 °C (97.5 °F)      Resp 16 16  16      Height (in)     1.803 m (5' 11\")  1.778 m (5' 10\")   Weight (lb)   196.65  203 201 197   BMI   27.43 kg/m2  28.31 kg/m2 28.03 kg/m2 28.27 kg/m2   BSA (m2)   2.11 m2  2.15 m2 2.14 m2 2.1 m2        Physical Exam:   Constitutional: Cooperative, in no acute distress, alert, appears stated age.  Skin: Skin color, texture, turgor normal. No rashes or lesions.  Head: Normocephalic. No masses, lesions, tenderness or abnormalities  Eyes: Extraocular movements are grossly intact.  Mouth and throat: Mucous membranes moist  Neck: Neck supple, no carotid bruits, no JVD  Respiratory: Lungs " clear to auscultation, no wheezing or rhonchi, no use of accessory muscles  Chest wall: No scars, normal excursion with respiration  Cardiovascular: Regular rhythm, +murmur  Gastrointestinal: Abdomen soft, nontender. Bowel sounds normal.  Musculoskeletal: Strength equal in upper extremities  Extremities: Trace pitting edema, more on the left leg than the right.  Neurologic: Sensation grossly intact, alert and oriented ×3    Intake/Output:   No intake/output data recorded.    Outpatient Medications  Current Outpatient Medications on File Prior to Visit   Medication Sig Dispense Refill    acetaminophen (Tylenol) 325 mg tablet Take 2 tablets (650 mg) by mouth every 4 hours if needed.      aspirin 81 mg chewable tablet Chew 1 tablet (81 mg) once daily. 30 tablet 3    atorvastatin (Lipitor) 80 mg tablet take 1 tablet by mouth once daily 90 tablet 1    B complex-C-E-FA-Zn-lysine tablet Take 1 tablet by mouth once daily.      carvedilol (Coreg) 3.125 mg tablet take 1 tablet by mouth twice a day with meals 120 tablet 1    clopidogrel (Plavix) 75 mg tablet Take 1 tablet (75 mg) by mouth once daily.      collagenase (SantyL) 250 unit/gram ointment       cyanocobalamin (Vitamin B-12) 1,000 mcg tablet Take 1 tablet (1,000 mcg) by mouth once daily. 30 tablet 90    cyclobenzaprine (Flexeril) 5 mg tablet Take 1 tablet (5 mg) by mouth once daily at bedtime.      empagliflozin (Jardiance) 10 mg Take 1 tablet (10 mg) by mouth once daily.      ergocalciferol (Vitamin D-2) 1.25 MG (02116 UT) capsule Take 1 capsule (50,000 Units) by mouth 1 (one) time per week.      FeroSuL tablet take 1 tablet by mouth once daily WITH A MEAL 90 tablet 1    folic acid (Folvite) 1 mg tablet take 1 tablet by mouth once daily 20 tablet 0    gabapentin (Neurontin) 100 mg capsule Take 1 capsule (100 mg) by mouth 3 times a day.      losartan (Cozaar) 50 mg tablet take 2 tablets by mouth once daily 90 tablet 1    melatonin 3 mg tablet Take 3 tablets (9 mg) by  mouth once daily at bedtime.      metFORMIN (Glucophage) 500 mg tablet Take 1 tablet (500 mg) by mouth 2 times a day with meals.      pantoprazole (ProtoNix) 40 mg EC tablet Take 1 tablet (40 mg) by mouth once daily in the morning. Take before meals.      Vitamin C 500 mg tablet Take 1 tablet (500 mg) by mouth once daily.       No current facility-administered medications on file prior to visit.       Labs: (past 26 weeks)  Recent Results (from the past 4368 hour(s))   Blood Gas Arterial Full Panel    Collection Time: 02/13/24 11:04 AM   Result Value Ref Range    POCT pH, Arterial 7.40 7.38 - 7.42 pH    POCT pCO2, Arterial 36 (L) 38 - 42 mm Hg    POCT pO2, Arterial 62 (L) 85 - 95 mm Hg    POCT SO2, Arterial 91 (L) 94 - 100 %    POCT Oxy Hemoglobin, Arterial 89.3 (L) 94.0 - 98.0 %    POCT Hematocrit Calculated, Arterial 39.0 (L) 41.0 - 52.0 %    POCT Sodium, Arterial 132 (L) 136 - 145 mmol/L    POCT Potassium, Arterial 4.9 3.5 - 5.3 mmol/L    POCT Chloride, Arterial 102 98 - 107 mmol/L    POCT Ionized Calcium, Arterial 1.14 1.10 - 1.33 mmol/L    POCT Glucose, Arterial 200 (H) 74 - 99 mg/dL    POCT Lactate, Arterial 1.6 0.4 - 2.0 mmol/L    POCT Base Excess, Arterial -2.0 -2.0 - 3.0 mmol/L    POCT HCO3 Calculated, Arterial 22.3 22.0 - 26.0 mmol/L    POCT Hemoglobin, Arterial 13.0 (L) 13.5 - 17.5 g/dL    POCT Anion Gap, Arterial 13 10 - 25 mmo/L    Patient Temperature 37.0 degrees Celsius    FiO2 21 %   BLOOD GAS ARTERIAL, COOX    Collection Time: 02/13/24 11:04 AM   Result Value Ref Range    POCT pH, Arterial 7.40 7.38 - 7.42 pH    POCT pCO2, Arterial 36 (L) 38 - 42 mm Hg    POCT pO2, Arterial 62 (L) 85 - 95 mm Hg    POCT SO2, Arterial 91 (L) 94 - 100 %    POCT Oxy Hemoglobin, Arterial 89.3 (L) 94.0 - 98.0 %    POCT Base Excess, Arterial -2.0 -2.0 - 3.0 mmol/L    POCT HCO3 Calculated, Arterial 22.3 22.0 - 26.0 mmol/L    POCT Hemoglobin, Arterial 13.0 (L) 13.5 - 17.5 g/dL    POCT Carboxyhemoglobin, Arterial 1.5 %     POCT Methemoglobin, Arterial 0.0 0.0 - 1.5 %    POCT Deoxy Hemoglobin, Arterial 8.1 (H) 0.0 - 5.0 %    Patient Temperature 37.0 degrees Celsius    FiO2 21 %   Pulmonary function test    Collection Time: 02/13/24 11:40 AM   Result Value Ref Range    FVC - Predicted 4.22     FEV1 - Predicted 3.24     FVC - PRE 4.23     FEV1 - Pre 3.40        ECG  No results found for this or any previous visit (from the past 4464 hour(s)).    Echocardiogram  No results found for this or any previous visit from the past 1095 days.      CV Studies:  EKG:No results found for this or any previous visit (from the past 4464 hour(s)).  Echocardiogram:   Echocardiogram     [place] [id number]  Phone [phone] Fax [phone]    TRANSTHORACIC ECHOCARDIOGRAM REPORT      Patient Name:     KVNG KANA     Vika Physician:  [id number] Yesenia Seth MD  Study Date:       1/9/2023           Referring           SILVIA ALCALA  Physician:  MRN/PID:          [id number]           PCP:  Accession/Order#: [id number]          [facility] Echo Lab  Location:  YOB: 1960          Fellow:  Gender:           M                  Nurse:              Analilia Zuluaga RN PRN  Admit Date:       1/7/2023           Sonographer:        Madhuri Salazar RDCS  Admission Status: Inpatient -        Additional Staff:  Routine  Height:           180.34 cm          CC Report to:  Weight:           90.27 kg           Study Type:         Echocardiogram  BSA:              2.10 m2  Blood Pressure: 167 /81 mmHg    Diagnosis/ICD: G45.8-Other transient cerebral ischemic attacks and related  syndromes  Indication:    Stroke  Procedure/CPT: Echo Complete w Full Doppler-[id number]    Patient History:  Pertinent History: CVA, HLD, DMII.    Study Detail: The following Echo studies were performed: 2D, M-Mode, Doppler and  color flow. Technically challenging study due to poor acoustic  windows and  prominent lung artifact. Optison used as a contrast  agent for endocardial border definition and agitated saline used  as a contrast agent for intraseptal flow evaluation. Total  contrast used for this procedure was 3 mL via IV push.      PHYSICIAN INTERPRETATION:  Left Ventricle: Left ventricular systolic function is normal, with an estimated ejection fraction of 60-65%. There are no regional wall motion abnormalities. The left ventricular cavity size is normal. Spectral Doppler shows a normal pattern of left ventricular diastolic filling.  Left Atrium: The left atrium is normal in size. A bubble study using agitated saline was performed. Bubble study is negative.  Right Ventricle: The right ventricle was not well visualized. There is normal right ventricular global systolic function. There is a possible device noted in the right ventricle.  Right Atrium: The right atrium is normal in size.  Aortic Valve: The aortic valve is probably trileaflet. There is evidence of mild aortic valve stenosis.  The aortic valve dimensionless index is 0.58. There is no evidence of aortic valve regurgitation. The peak instantaneous gradient of the aortic valve is 16.3 mmHg. The mean gradient of the aortic valve is 9.0 mmHg.  Mitral Valve: The mitral valve is normal in structure. There is no evidence of mitral valve regurgitation.  Tricuspid Valve: The tricuspid valve is structurally normal. No evidence of tricuspid regurgitation.  Pulmonic Valve: The pulmonic valve is structurally normal. There is physiologic pulmonic valve regurgitation.  Pericardium: There is no pericardial effusion noted.  Aorta: The aortic root is abnormal. There is mild dilatation of the ascending aorta.      CONCLUSIONS:  1. Left ventricular systolic function is normal with a 60-65% estimated ejection fraction.  2. Mild aortic valve stenosis.    QUANTITATIVE DATA SUMMARY:  2D MEASUREMENTS:  Normal Ranges:  Ao Root d:     3.70 cm   (2.0-3.7cm)  LAs:            4.00 cm   (2.7-4.0cm)  IVSd:          1.24 cm   (0.6-1.1cm)  LVPWd:         1.10 cm   (0.6-1.1cm)  LVIDd:         4.51 cm   (3.9-5.9cm)  LVIDs:         3.09 cm  LV Mass Index: 91.1 g/m2  LV % FS        31.5 %    LA VOLUME:  Normal Ranges:  LA Vol A4C:        40.1 ml    (22+/-6mL/m2)  LA Vol A2C:        50.5 ml  LA Vol BP:         47.0 ml  LA Vol Index A4C:  19.1ml/m2  LA Vol Index A2C:  24.0 ml/m2  LA Vol Index BP:   22.3 ml/m2  LA Area A4C:       16.4 cm2  LA Area A2C:       19.2 cm2  LA Major Axis A4C: 5.7 cm  LA Major Axis A2C: 6.2 cm  LA Volume Index:   22.0 ml/m2    RA VOLUME BY A/L METHOD:  Normal Ranges:  RA Area A4C: 14.3 cm2    AORTA MEASUREMENTS:  Normal Ranges:  Ao Sinus, d: 3.70 cm (2.1-3.5cm)  Ao STJ, d:   3.10 cm (1.7-3.4cm)  Asc Ao, d:   4.00 cm (2.1-3.4cm)    LV SYSTOLIC FUNCTION BY 2D PLANIMETRY (MOD):  Normal Ranges:  EF-A4C View: 73.9 % (>=55%)  EF-A2C View: 62.8 %  EF-Biplane:  67.0 %    LV DIASTOLIC FUNCTION:  Normal Ranges:  MV Peak E:        0.80 m/s    (0.7-1.2 m/s)  MV Peak A:        1.28 m/s    (0.42-0.7 m/s)  E/A Ratio:        0.63        (1.0-2.2)  MV e'             0.06 m/s    (>8.0)  MV lateral e'     0.08 m/s  MV medial e'      0.05 m/s  MV A Dur:         108.00 msec  E/e' Ratio:       12.38       (<8.0)  PulmV A Revs Dur: 148.00 msec    AORTIC VALVE:  Normal Ranges:  AoV Vmax:                2.02 m/s  (<=1.7m/s)  AoV Peak P.3 mmHg (<20mmHg)  AoV Mean P.0 mmHg  (1.7-11.5mmHg)  LVOT Max Miki:            1.24 m/s  (<=1.1m/s)  AoV VTI:                 35.20 cm  (18-25cm)  LVOT VTI:                20.50 cm  LVOT Diameter:           2.00 cm   (1.8-2.4cm)  AoV Area, VTI:           1.83 cm2  (2.5-5.5cm2)  AoV Area,Vmax:           1.93 cm2  (2.5-4.5cm2)  AoV Dimensionless Index: 0.58    RIGHT VENTRICLE:  RV 1   3.5 cm  TAPSE: 22.5 mm  RV s'  0.19 m/s    TRICUSPID VALVE/RVSP:  Normal Ranges:  TV E Vmax: 0.70 m/s (0.3-0.7m/s)  TV A Vmax: 1.08 m/s  IVC Diam:  1.40  cm    Pulmonary Veins:  PulmV A Revs Dur: 148.00 msec      [id number] Yesenia Seth MD  Electronically signed on 1/9/2023 at 11:47:36 AM         Final     Stress Testing MATT([id number]:1:1825): No results found for this or any previous visit from the past 1825 days.    Cardiac Catheterization: No results found for this or any previous visit from the past 1825 days.  No results found for this or any previous visit from the past 3650 days.     Cardiac Scoring: No results found for this or any previous visit from the past 1825 days.    AAA : No results found for this or any previous visit from the past 1825 days.    OTHER: No results found for this or any previous visit from the past 1825 days.    LAST IMAGING RESULTS  Pulmonary function test  Forced expiration spirometry demonstrates no airflow instruction.  Spirograms are good quality and plateau normally.  The flow volume loop reveals a normal pattern.Forced vital capacity is 100% of predicted.FEV1 is 2.40 L or 104% of predicted.FEV1 to FVC ratio is 80.The diffusion capacity is at the lower limit of normal at 76% of predicted.Impression:#1.  These pulmonary function studies are within normal limits      Problem List Items Addressed This Visit       HTN (hypertension) (Chronic)    HLD (hyperlipidemia) (Chronic)    Diabetes mellitus type II, non insulin dependent (Multi)    Aortic valve disease    Coronary artery disease involving native coronary artery of native heart without angina pectoris - Primary    Bilateral carotid artery stenosis    Anemia          Umesh Newby DO, FACC, FACOI

## 2024-08-02 ENCOUNTER — APPOINTMENT (OUTPATIENT)
Dept: CARDIOLOGY | Facility: CLINIC | Age: 64
End: 2024-08-02
Payer: MEDICARE

## 2024-08-02 VITALS
HEART RATE: 72 BPM | BODY MASS INDEX: 28.27 KG/M2 | DIASTOLIC BLOOD PRESSURE: 65 MMHG | HEIGHT: 70 IN | SYSTOLIC BLOOD PRESSURE: 120 MMHG

## 2024-08-02 DIAGNOSIS — I65.23 BILATERAL CAROTID ARTERY STENOSIS: ICD-10-CM

## 2024-08-02 DIAGNOSIS — E78.2 MIXED HYPERLIPIDEMIA: Chronic | ICD-10-CM

## 2024-08-02 DIAGNOSIS — E11.9 DIABETES MELLITUS TYPE II, NON INSULIN DEPENDENT (MULTI): ICD-10-CM

## 2024-08-02 DIAGNOSIS — E78.5 HYPERLIPIDEMIA, UNSPECIFIED HYPERLIPIDEMIA TYPE: Chronic | ICD-10-CM

## 2024-08-02 DIAGNOSIS — I10 PRIMARY HYPERTENSION: Chronic | ICD-10-CM

## 2024-08-02 DIAGNOSIS — D50.8 IRON DEFICIENCY ANEMIA SECONDARY TO INADEQUATE DIETARY IRON INTAKE: ICD-10-CM

## 2024-08-02 DIAGNOSIS — I21.4 NSTEMI (NON-ST ELEVATED MYOCARDIAL INFARCTION) (MULTI): ICD-10-CM

## 2024-08-02 DIAGNOSIS — D64.9 ANEMIA, UNSPECIFIED TYPE: ICD-10-CM

## 2024-08-02 DIAGNOSIS — I35.9 AORTIC VALVE DISEASE: ICD-10-CM

## 2024-08-02 DIAGNOSIS — I25.10 CORONARY ARTERY DISEASE INVOLVING NATIVE CORONARY ARTERY OF NATIVE HEART WITHOUT ANGINA PECTORIS: Primary | ICD-10-CM

## 2024-08-02 DIAGNOSIS — E78.00 PURE HYPERCHOLESTEROLEMIA: Chronic | ICD-10-CM

## 2024-08-02 PROCEDURE — 3074F SYST BP LT 130 MM HG: CPT | Performed by: INTERNAL MEDICINE

## 2024-08-02 PROCEDURE — 93000 ELECTROCARDIOGRAM COMPLETE: CPT | Performed by: INTERNAL MEDICINE

## 2024-08-02 PROCEDURE — 4010F ACE/ARB THERAPY RXD/TAKEN: CPT | Performed by: INTERNAL MEDICINE

## 2024-08-02 PROCEDURE — 1036F TOBACCO NON-USER: CPT | Performed by: INTERNAL MEDICINE

## 2024-08-02 PROCEDURE — 99215 OFFICE O/P EST HI 40 MIN: CPT | Performed by: INTERNAL MEDICINE

## 2024-08-02 PROCEDURE — 3078F DIAST BP <80 MM HG: CPT | Performed by: INTERNAL MEDICINE

## 2024-08-02 RX ORDER — GABAPENTIN 300 MG/1
1 CAPSULE ORAL
COMMUNITY
Start: 2024-06-07

## 2024-08-02 RX ORDER — TRAMADOL HYDROCHLORIDE 50 MG/1
1 TABLET ORAL EVERY 8 HOURS PRN
COMMUNITY
Start: 2024-07-10

## 2024-08-08 DIAGNOSIS — G89.29 CHRONIC BILATERAL LOW BACK PAIN WITHOUT SCIATICA: ICD-10-CM

## 2024-08-08 DIAGNOSIS — M54.50 CHRONIC BILATERAL LOW BACK PAIN WITHOUT SCIATICA: ICD-10-CM

## 2024-08-09 RX ORDER — TRAMADOL HYDROCHLORIDE 50 MG/1
50 TABLET ORAL EVERY 8 HOURS PRN
Qty: 90 TABLET | Refills: 0 | Status: SHIPPED | OUTPATIENT
Start: 2024-08-09 | End: 2024-09-08

## 2024-09-09 DIAGNOSIS — M54.50 CHRONIC BILATERAL LOW BACK PAIN WITHOUT SCIATICA: Primary | ICD-10-CM

## 2024-09-09 DIAGNOSIS — Z76.0 MEDICATION REFILL: ICD-10-CM

## 2024-09-09 DIAGNOSIS — G89.29 CHRONIC BILATERAL LOW BACK PAIN WITHOUT SCIATICA: Primary | ICD-10-CM

## 2024-09-09 RX ORDER — GABAPENTIN 300 MG/1
300 CAPSULE ORAL 2 TIMES DAILY
Qty: 180 CAPSULE | Refills: 1 | Status: SHIPPED | OUTPATIENT
Start: 2024-09-09 | End: 2025-03-08

## 2024-09-10 DIAGNOSIS — M54.50 CHRONIC BILATERAL LOW BACK PAIN WITHOUT SCIATICA: ICD-10-CM

## 2024-09-10 DIAGNOSIS — G89.29 CHRONIC BILATERAL LOW BACK PAIN WITHOUT SCIATICA: ICD-10-CM

## 2024-09-11 RX ORDER — TRAMADOL HYDROCHLORIDE 50 MG/1
50 TABLET ORAL EVERY 8 HOURS PRN
Qty: 90 TABLET | Refills: 0 | OUTPATIENT
Start: 2024-09-11 | End: 2024-10-11

## 2024-09-12 RX ORDER — TRAMADOL HYDROCHLORIDE 50 MG/1
50 TABLET ORAL EVERY 8 HOURS PRN
Qty: 54 TABLET | Refills: 0 | Status: SHIPPED
Start: 2024-09-12 | End: 2024-09-12 | Stop reason: CLARIF

## 2024-09-30 ENCOUNTER — OFFICE VISIT (OUTPATIENT)
Dept: PRIMARY CARE CLINIC | Age: 64
End: 2024-09-30
Payer: COMMERCIAL

## 2024-09-30 VITALS
WEIGHT: 193 LBS | HEART RATE: 71 BPM | OXYGEN SATURATION: 98 % | SYSTOLIC BLOOD PRESSURE: 149 MMHG | TEMPERATURE: 97.5 F | RESPIRATION RATE: 18 BRPM | BODY MASS INDEX: 27.02 KG/M2 | HEIGHT: 71 IN | DIASTOLIC BLOOD PRESSURE: 75 MMHG

## 2024-09-30 DIAGNOSIS — I10 PRIMARY HYPERTENSION: ICD-10-CM

## 2024-09-30 DIAGNOSIS — Z86.73 HISTORY OF STROKE: ICD-10-CM

## 2024-09-30 DIAGNOSIS — F11.20 OPIOID DEPENDENCE WITH CURRENT USE (HCC): ICD-10-CM

## 2024-09-30 DIAGNOSIS — I63.9 CEREBROVASCULAR ACCIDENT (CVA), UNSPECIFIED MECHANISM (HCC): ICD-10-CM

## 2024-09-30 DIAGNOSIS — Z76.0 MEDICATION REFILL: ICD-10-CM

## 2024-09-30 DIAGNOSIS — I73.9 PERIPHERAL ARTERIAL DISEASE (HCC): ICD-10-CM

## 2024-09-30 DIAGNOSIS — I69.354 HEMIPARESIS AFFECTING LEFT SIDE AS LATE EFFECT OF CEREBROVASCULAR ACCIDENT (HCC): ICD-10-CM

## 2024-09-30 DIAGNOSIS — E11.9 TYPE 2 DIABETES MELLITUS WITHOUT COMPLICATION, WITHOUT LONG-TERM CURRENT USE OF INSULIN (HCC): Primary | ICD-10-CM

## 2024-09-30 LAB — HBA1C MFR BLD: 6.1 %

## 2024-09-30 PROCEDURE — 3044F HG A1C LEVEL LT 7.0%: CPT | Performed by: FAMILY MEDICINE

## 2024-09-30 PROCEDURE — 83036 HEMOGLOBIN GLYCOSYLATED A1C: CPT | Performed by: FAMILY MEDICINE

## 2024-09-30 PROCEDURE — 3077F SYST BP >= 140 MM HG: CPT | Performed by: FAMILY MEDICINE

## 2024-09-30 PROCEDURE — 1123F ACP DISCUSS/DSCN MKR DOCD: CPT | Performed by: FAMILY MEDICINE

## 2024-09-30 PROCEDURE — 1036F TOBACCO NON-USER: CPT | Performed by: FAMILY MEDICINE

## 2024-09-30 PROCEDURE — 3078F DIAST BP <80 MM HG: CPT | Performed by: FAMILY MEDICINE

## 2024-09-30 PROCEDURE — 99214 OFFICE O/P EST MOD 30 MIN: CPT | Performed by: FAMILY MEDICINE

## 2024-09-30 PROCEDURE — G8427 DOCREV CUR MEDS BY ELIG CLIN: HCPCS | Performed by: FAMILY MEDICINE

## 2024-09-30 PROCEDURE — 3017F COLORECTAL CA SCREEN DOC REV: CPT | Performed by: FAMILY MEDICINE

## 2024-09-30 PROCEDURE — 2022F DILAT RTA XM EVC RTNOPTHY: CPT | Performed by: FAMILY MEDICINE

## 2024-09-30 PROCEDURE — G8419 CALC BMI OUT NRM PARAM NOF/U: HCPCS | Performed by: FAMILY MEDICINE

## 2024-09-30 RX ORDER — ATORVASTATIN CALCIUM 40 MG/1
80 TABLET, FILM COATED ORAL DAILY
Qty: 180 TABLET | Refills: 1 | Status: SHIPPED | OUTPATIENT
Start: 2024-09-30

## 2024-09-30 RX ORDER — CARVEDILOL 3.12 MG/1
3.12 TABLET ORAL 2 TIMES DAILY WITH MEALS
Qty: 180 TABLET | Refills: 1 | Status: SHIPPED | OUTPATIENT
Start: 2024-09-30

## 2024-09-30 RX ORDER — TRAMADOL HYDROCHLORIDE 50 MG/1
50 TABLET ORAL EVERY 8 HOURS PRN
Qty: 90 TABLET | Refills: 0 | Status: SHIPPED | OUTPATIENT
Start: 2024-09-30 | End: 2024-10-30

## 2024-09-30 RX ORDER — TRAMADOL HYDROCHLORIDE 50 MG/1
50 TABLET ORAL EVERY 8 HOURS PRN
COMMUNITY
Start: 2024-07-10 | End: 2024-09-30 | Stop reason: SDUPTHER

## 2024-09-30 RX ORDER — LOSARTAN POTASSIUM 50 MG/1
100 TABLET ORAL DAILY
Qty: 180 TABLET | Refills: 1 | Status: SHIPPED | OUTPATIENT
Start: 2024-09-30

## 2024-09-30 SDOH — ECONOMIC STABILITY: FOOD INSECURITY: WITHIN THE PAST 12 MONTHS, YOU WORRIED THAT YOUR FOOD WOULD RUN OUT BEFORE YOU GOT MONEY TO BUY MORE.: NEVER TRUE

## 2024-09-30 SDOH — ECONOMIC STABILITY: FOOD INSECURITY: WITHIN THE PAST 12 MONTHS, THE FOOD YOU BOUGHT JUST DIDN'T LAST AND YOU DIDN'T HAVE MONEY TO GET MORE.: NEVER TRUE

## 2024-09-30 SDOH — ECONOMIC STABILITY: INCOME INSECURITY: HOW HARD IS IT FOR YOU TO PAY FOR THE VERY BASICS LIKE FOOD, HOUSING, MEDICAL CARE, AND HEATING?: NOT HARD AT ALL

## 2024-09-30 NOTE — PROGRESS NOTES
[facility]  Family Medicine      Patient:  Jerome Steel 64 y.o. male  Date of Service: 9/30/24      Chief complaint:   Chief Complaint   Patient presents with    Diabetes     4 month follow up     Medication Refill         History of Present Illness   Jerome Steel is a 64 y.o. male who presents to the clinic with complaints as above.    Diabetes Mellitus Type 2  Hemoglobin A1C   Date Value Ref Range Status   05/10/2024 6.4 % Corrected   POCT A1C today 6.1  Medications include Jardiance 10 mg daily, metformin 500 mg twice daily, taking as prescribed  Blood sugars at home are controlled  Denies symptoms of high or low blood sugars, medication side effects     Hypertension  BP Readings from Last 3 Encounters:   09/30/24 (!) 149/75   06/20/24 (!) 125/58   06/15/24 130/70   Blood pressures controlled at home  Medications include Coreg 3.125 mg twice daily, losartan 100 mg daily, taking as prescribed  Denies symptoms of shakiness, dizziness, lightheadedness, high or low blood pressure  Blood work monitoring not due today    Current Health Maintenance:  Health Maintenance   Topic Date Due    Pneumococcal 65+ years Vaccine (1 of 2 - PCV) Never done    HIV screen  Never done    Diabetic Alb to Cr ratio (uACR) test  Never done    Colorectal Cancer Screen  Never done    Shingles vaccine (1 of 2) Never done    Lipids  03/01/2017    Respiratory Syncytial Virus (RSV) Pregnant or age 60 yrs+ (1 - 1-dose 60+ series) Never done    Diabetic retinal exam  08/08/2020    Diabetic foot exam  09/13/2023    Flu vaccine (1) Never done    COVID-19 Vaccine (2 - 2023-24 season) 09/01/2024    AAA screen  09/11/2024    Lung Cancer Screening &/or Counseling  02/01/2025    Depression Screen  02/06/2025    Annual Wellness Visit (Medicare)  02/06/2025    GFR test (Diabetes, CKD 3-4, OR last GFR 15-59)  06/20/2025    A1C test (Diabetic or Prediabetic)  09/30/2025    DTaP/Tdap/Td vaccine (2 - Td or Tdap) 06/15/2034

## 2024-10-28 DIAGNOSIS — E11.9 TYPE 2 DIABETES MELLITUS WITHOUT COMPLICATION, WITHOUT LONG-TERM CURRENT USE OF INSULIN (HCC): ICD-10-CM

## 2024-10-28 DIAGNOSIS — Z86.73 HISTORY OF STROKE: ICD-10-CM

## 2024-10-28 DIAGNOSIS — I73.9 PERIPHERAL ARTERIAL DISEASE (HCC): ICD-10-CM

## 2024-10-28 DIAGNOSIS — I69.354 HEMIPARESIS AFFECTING LEFT SIDE AS LATE EFFECT OF CEREBROVASCULAR ACCIDENT (HCC): ICD-10-CM

## 2024-10-28 DIAGNOSIS — I63.9 CEREBROVASCULAR ACCIDENT (CVA), UNSPECIFIED MECHANISM (HCC): ICD-10-CM

## 2024-10-28 RX ORDER — ERGOCALCIFEROL 1.25 MG/1
50000 CAPSULE, LIQUID FILLED ORAL WEEKLY
Qty: 12 CAPSULE | Refills: 1 | Status: SHIPPED | OUTPATIENT
Start: 2024-10-28

## 2024-10-28 RX ORDER — GLUCOSA SU 2KCL/CHONDROITIN SU 500-400 MG
1 CAPSULE ORAL DAILY
Qty: 90 TABLET | Refills: 1 | Status: SHIPPED | OUTPATIENT
Start: 2024-10-28

## 2024-10-28 RX ORDER — TRAMADOL HYDROCHLORIDE 50 MG/1
50 TABLET ORAL EVERY 8 HOURS PRN
Qty: 90 TABLET | Refills: 0 | Status: SHIPPED | OUTPATIENT
Start: 2024-10-28 | End: 2024-11-27

## 2024-10-28 RX ORDER — ATORVASTATIN CALCIUM 40 MG/1
80 TABLET, FILM COATED ORAL DAILY
Qty: 180 TABLET | Refills: 3 | Status: SHIPPED | OUTPATIENT
Start: 2024-10-28

## 2024-10-28 RX ORDER — CLOPIDOGREL BISULFATE 75 MG/1
75 TABLET ORAL DAILY
Qty: 90 TABLET | Refills: 1 | OUTPATIENT
Start: 2024-10-28 | End: 2025-10-23

## 2024-10-28 NOTE — TELEPHONE ENCOUNTER
Name of Medication(s) Requested:  Requested Prescriptions     Pending Prescriptions Disp Refills    atorvastatin (LIPITOR) 40 MG tablet 180 tablet 1     Sig: Take 2 tablets by mouth daily    metFORMIN (GLUCOPHAGE) 500 MG tablet 180 tablet 1     Sig: Take 1 tablet by mouth 2 times daily (with meals)    clopidogrel (PLAVIX) 75 MG tablet 90 tablet 1     Sig: Take 1 tablet by mouth daily    B Complex-C-E-Zn (STRESS B/ZINC) TABS 30 tablet      Sig: Take 1 tablet by mouth daily    vitamin D (ERGOCALCIFEROL) 1.25 MG (29112 UT) CAPS capsule 12 capsule 1     Sig: Take 1 capsule by mouth Once a week at 5 PM    traMADol (ULTRAM) 50 MG tablet 90 tablet 0     Sig: Take 1 tablet by mouth every 8 hours as needed for Pain for up to 30 days. Max Daily Amount: 150 mg       Medication is on current medication list Yes    Dosage and directions were verified? Yes    Quantity verified: 90 day supply     Pharmacy Verified?  Yes    Last Appointment:  9/30/2024    Future appts:  Future Appointments   Date Time Provider Department Center   1/6/2025  1:30 PM Genet Kraus DO [facility] DEP        (If no appt send self scheduling link. .REFILLAPPT)  Scheduling request sent?     [] Yes  [x] No    Does patient need updated?  [] Yes  [x] No

## 2024-11-16 ENCOUNTER — APPOINTMENT (OUTPATIENT)
Dept: CT IMAGING | Age: 64
End: 2024-11-16
Payer: COMMERCIAL

## 2024-11-16 ENCOUNTER — HOSPITAL ENCOUNTER (EMERGENCY)
Age: 64
Discharge: HOME | End: 2024-11-16
Payer: COMMERCIAL

## 2024-11-16 VITALS
HEART RATE: 82 BPM | RESPIRATION RATE: 18 BRPM | WEIGHT: 193 LBS | DIASTOLIC BLOOD PRESSURE: 85 MMHG | BODY MASS INDEX: 26.93 KG/M2 | OXYGEN SATURATION: 95 % | TEMPERATURE: 98.4 F | SYSTOLIC BLOOD PRESSURE: 107 MMHG

## 2024-11-16 DIAGNOSIS — S09.90XA CLOSED HEAD INJURY WITHOUT LOSS OF CONSCIOUSNESS, INITIAL ENCOUNTER: ICD-10-CM

## 2024-11-16 DIAGNOSIS — S20.212A CONTUSION OF LEFT CHEST WALL, INITIAL ENCOUNTER: ICD-10-CM
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
## 2024-11-16 LAB — GLUCOSE BLD-MCNC: 207 MG/DL (ref 74–99)

## 2024-11-16 PROCEDURE — 99284 EMERGENCY DEPT VISIT MOD MDM: CPT

## 2024-11-16 PROCEDURE — 71250 CT THORAX DX C-: CPT

## 2024-11-16 PROCEDURE — 70450 CT HEAD/BRAIN W/O DYE: CPT

## 2024-11-16 PROCEDURE — 82962 GLUCOSE BLOOD TEST: CPT

## 2024-11-16 ASSESSMENT — LIFESTYLE VARIABLES
HOW MANY STANDARD DRINKS CONTAINING ALCOHOL DO YOU HAVE ON A TYPICAL DAY: PATIENT DOES NOT DRINK
HOW OFTEN DO YOU HAVE A DRINK CONTAINING ALCOHOL: NEVER

## 2024-11-16 NOTE — ED PROVIDER NOTES
Independent [facility] Visit.     [facility]  Department of Emergency Medicine   ED  Encounter Note  Admit Date/RoomTime: 2024  5:32 PM  ED Room: [place] [address]  NAME: Jerome Steel  : 1960  MRN: [id number]     Chief Complaint:  Fall (Fell earlier today. - Hit head, - LOC, - thinners. ) and Rib Pain (injury) (Fell onto the left side and having left rib pain. )    HISTORY OF PRESENT ILLNESS        Jerome Steel is a 64 y.o. male who presents to the ED for evaluation after a fall.  Patient has a history of CVA with left-sided weakness.  Patient states that he dropped his [employer] back.  Went to bend over to pick it up and fell.  Landed on his left side.  Patient does admit hitting his head on his car tire.    And then landed on his left side in the parking lot.  He presents with left rib pain.  Patient also has multiple Band-Aids and dressings to both forearms.  He states he fell last week and EMS put the dressings on a week ago.  The bed dressings have not been changed.  Patient is a diabetic.  Patient is on Plavix.  Symptoms are moderate in severity.    ROS   Pertinent positives and negatives are stated within HPI, all other systems reviewed and are negative.    Past Medical History:  has a past medical history of Arthritis, Bilateral carpal tunnel syndrome, Cerebral artery occlusion with cerebral infarction (HCC), Chronic back pain, Coronary artery disease involving native coronary artery of native heart without angina pectoris, CVA (cerebral vascular accident) (HCC), Diabetes mellitus (HCC), Diabetic foot ulcer with osteomyelitis (HCC), 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 2 diabetes mellitus, with necrosis of bone (HCC), Hemiparesis affecting left side as late effect of cerebrovascular accident (HCC), Hyperlipidemia, Hypertension, Moderate aortic regurgitation, Peripheral vascular angioplasty status, PFO (patent foramen ovale), Ulcer of left heel, with necrosis of bone (HCC),  distended.  No CVA tenderness.  Pelvic:  Extremities:    Multiple areas of bruising and skin tears to both upper extremities.  Left foot in a postop shoe  Back: Positive ecchymosis to left thoracic back  Neuro:  Alert and Oriented to person, place, time and situation.  Normal LOC.  Moves all extremities.  Speech fluent.  Psych:  Calm and Cooperative.  Normal thought process.  Normal judgement.    Lab / Imaging Results   (All laboratory and radiology results have been personally reviewed by myself)  Labs:  Results for orders placed or performed during the hospital encounter of 11/16/24   POCT Glucose   Result Value Ref Range    POC Glucose 207 (H) 74 - 99 mg/dL     Imaging:  All Radiology results interpreted by Radiologist unless otherwise noted.  CT CHEST WO CONTRAST   Final Result   No acute cardiopulmonary process.  No rib fracture radiographically detected.         CT Head W/O Contrast   Final Result   No acute intracranial abnormality.             ED Course / Medical Decision Making   Medications - No data to display     Re-examination:  11/16/24       Time:   Patient’s condition .    Consult(s):   None    Procedure(s):   none    MDM:   ED COURSE / MEDICAL DECISION MAKING    Recap: 64-year-old male with a CVA and left-sided weakness presents after a fall in the parking lot with sustained head injury and left chest wall/thoracic back injury  History was provided by patient and there are no social determinants affecting patient care.  Records Reviewed: None  Results/Interventions:   CT of the head negative for acute process  CT chest negative for rib fractures, negative for pneumothorax    Differential Diagnoses considered but not fully inclusive: Head injury.  Cerebral bleed.  Frequent falls.  Rib fracture.  Chest wall contusion.  Pneumothorax.    I am Primary Clinician of Record and case was not discussed with ED Physician.    Diagnosis, Disposition and any Prescriptions as follows  All results discussed with

## 2024-11-25 DIAGNOSIS — I69.354 HEMIPARESIS AFFECTING LEFT SIDE AS LATE EFFECT OF CEREBROVASCULAR ACCIDENT (HCC): ICD-10-CM

## 2024-11-25 DIAGNOSIS — K21.9 GASTROESOPHAGEAL REFLUX DISEASE WITHOUT ESOPHAGITIS: ICD-10-CM

## 2024-11-25 DIAGNOSIS — I63.9 CEREBROVASCULAR ACCIDENT (CVA), UNSPECIFIED MECHANISM (HCC): ICD-10-CM

## 2024-11-25 DIAGNOSIS — I25.10 CORONARY ARTERY DISEASE INVOLVING NATIVE CORONARY ARTERY OF NATIVE HEART WITHOUT ANGINA PECTORIS: ICD-10-CM

## 2024-11-25 DIAGNOSIS — L97.424 DIABETIC ULCER OF LEFT HEEL ASSOCIATED WITH TYPE 2 DIABETES MELLITUS, WITH NECROSIS OF BONE (HCC): ICD-10-CM

## 2024-11-25 DIAGNOSIS — E11.621 DIABETIC ULCER OF LEFT HEEL ASSOCIATED WITH TYPE 2 DIABETES MELLITUS, WITH NECROSIS OF BONE (HCC): ICD-10-CM

## 2024-11-25 RX ORDER — TRAMADOL HYDROCHLORIDE 50 MG/1
50 TABLET ORAL EVERY 8 HOURS PRN
Qty: 90 TABLET | Refills: 0 | Status: SHIPPED | OUTPATIENT
Start: 2024-11-25 | End: 2024-12-25

## 2024-11-25 RX ORDER — PANTOPRAZOLE SODIUM 40 MG/1
40 TABLET, DELAYED RELEASE ORAL DAILY
Qty: 90 TABLET | Refills: 1 | Status: SHIPPED | OUTPATIENT
Start: 2024-11-25

## 2024-11-25 NOTE — TELEPHONE ENCOUNTER
Name of Medication(s) Requested:  Requested Prescriptions     Pending Prescriptions Disp Refills    traMADol (ULTRAM) 50 MG tablet 90 tablet 0     Sig: Take 1 tablet by mouth every 8 hours as needed for Pain for up to 30 days. Max Daily Amount: 150 mg    empagliflozin (JARDIANCE) 10 MG tablet 90 tablet 1     Sig: Take 1 tablet by mouth daily    pantoprazole (PROTONIX) 40 MG tablet 90 tablet 1     Sig: Take 1 tablet by mouth daily       Medication is on current medication list Yes    Dosage and directions were verified? Yes    Quantity verified: 90 day supply     Pharmacy Verified?  Yes    Last Appointment:  9/30/2024    Future appts:  Future Appointments   Date Time Provider Department Center   1/6/2025  1:30 PM Genet Kraus,  [facility] E[facility] DEP        (If no appt send self scheduling link. .REFILLAPPT)  Scheduling request sent?     [] Yes  [x] No    Does patient need updated?  [] Yes  [x] No

## 2024-12-02 PROBLEM — Z86.79 HISTORY OF SUBARACHNOID HEMORRHAGE: Status: ACTIVE | Noted: 2024-12-02

## 2024-12-02 NOTE — PROGRESS NOTES
[facility] Heart and Vascular Cardiology    Patient Name: Santana Mayo  Patient : 1960      Scribe Attestation  By signing my name below, I, Swapna Wise   attest that this documentation has been prepared under the direction and in the presence of Umesh Newby DO.      Reason for visit:  This is a 64-year-old male here for follow-up regarding his history of coronary artery disease with cath in 2023 showing  of the RCA and severe disease in the left main for which the patient had been recommended referral to CT surgery but declined, hypertension, dyslipidemia, diabetes mellitus, mild aortic valve stenosis, carotid artery disease, and history of subarachnoid hemorrhage in 2024.    HPI:  This is a 64-year-old male here for follow-up regarding his history of coronary artery disease with cath in 2023 showing  of the RCA and severe disease in the left main for which the patient had been recommended referral to CT surgery but declined, hypertension, dyslipidemia, diabetes mellitus, mild aortic valve stenosis, carotid artery disease, and history of subarachnoid hemorrhage in 2024.  Patient was last evaluated by me in 2024 at which time I ordered blood work including CMP/lipid/magnesium, recommended referral back to CT surgery which the patient declined, and asked the patient to follow-up in 3 months. ECG done today showed sinus rhythm with a heart rate of 87 bpm. The patient reports that he has been feeling generally well from the cardiac standpoint. He denies any new chest pain, shortness of breath, palpitations and lightheadedness. He states that he takes all of his medications as prescribed. During my exam, he was resting comfortably on the exam table.            Assessment/Plan:   Coronary artery disease  The has a history of coronary artery disease with cath in 2023 showing  of the RCA and severe disease in the left main for which  the patient had been recommended referral to CT surgery but declined.  ECG done today showed sinus rhythm with a heart rate of 87 bpm.  He denies anginal chest discomfort.  Blood pressure appears controlled on exam today.  He should continue current antihypertensive medications and antiplatelet therapy.  Echocardiogram done 10/3/2023 showed an ejection fraction of 50%.   Lipid panel done in January 2023 showed an LDL of 96 and triglycerides of 121 currently on atorvastatin 80 mg daily.   Lab works will be done today and again in 6 months prior to the next visit.   Please see lifestyle recommendations below.  Discussed with him his risk for coronary vascular events as well as the risks and benefits of bypass surgery. I again suggested referring him back to CT surgery for further evaluation and management. He expressed understanding and declined referral to CT surgery at this time as he is asymptomatic.  Patient is to restart taking aspirin 81 mg daily.  Follow up in 6 months and sooner if necessary.      2. Hypertension  The patient has a history of hypertension which appears controlled on exam today.  He should continue his current antihypertensive medications and monitor his blood pressure at home.     3. Dyslipidemia  Lipid panel done in January 2023 showed an LDL of 96 and triglycerides of 121 currently on atorvastatin 80 mg daily.   Lipid panel was ordered as noted below.   Please see lifestyle recommendations below.     4. Diabetes mellitus  Hemoglobin A1c done in September 2023 was 5.6%.  Medication management as per PCP.    5. Aortic valve stenosis  The patient had a history of mild aortic valve stenosis seen on echocardiogram done in January 2023.  I will continue to monitor this clinically for now.      6. Carotid artery disease  Carotid ultrasound done in December 2023 showed findings consistent with 50 to 69% stenosis of the right proximal internal carotid artery and less than 50% stenosis of the left  proximal internal carotid artery.   Continue risk factor modification.     7.  History of subarachnoid hemorrhage  The patient had a history of subarachnoid hemorrhage in June 2024.  He should continue to follow with Neurology.        Orders:  Restart aspirin 81 mg daily.  CMP/lipid/magnesium   Referral back to CT surgery -patient declined  BMP/CBC/magnesium in 6 months,   Follow-up in 6 months.        Lifestyle Recommendations  I recommend a whole-food plant-based diet, an eating pattern that encourages the consumption of unrefined plant foods (such as fruits, vegetables, tubers, whole grains, legumes, nuts and seeds) and discourages meats, dairy products, eggs and processed foods.     The AHA/ACC recommends that the patient consume a dietary pattern that emphasizes intake of vegetables, fruits, and whole grains; includes low-fat dairy products, poultry, fish, legumes, non-tropical vegetable oils, and nuts; and limits intake of sodium, sweets, sugar-sweetened beverages, and red meats.  Adapt this dietary pattern to appropriate calorie requirements (a 500-750 kcal/day deficit to loose weight), personal and cultural food preferences, and nutrition therapy for other medical conditions (including diabetes).  Achieve this pattern by following plans such as the [place] Mediterranean, DASH dietary pattern, or AHA diet.     Engage in 2 hours and 30 minutes per week of moderate-intensity physical activity, or 1 hour and 15 minutes (75 minutes) per week of vigorous-intensity aerobic physical activity, or an equivalent combination of moderate and vigorous-intensity aerobic physical activity. Aerobic activity should be performed in episodes of at least 10 minutes preferably spread throughout the week.     Adhering to a heart healthy diet, regular exercise habits, avoidance of tobacco products, and maintenance of a healthy weight are crucial components of their heart disease risk reduction.     Any positive review of systems not  specifically addressed in the office visit today should be evaluated and treated by the patients primary care physician or in an emergency department if necessary     Patient was notified that results from ordered tests will be called to the patient if it changes current management; it will otherwise be discussed at a future appointment and available on [facility].     Thank you for allowing me to participate in the care of this patient.        This document was generated using the assistance of voice recognition software. If there are any errors of spelling, grammar, syntax, or meaning; please feel free to contact me directly for clarification.    Past Medical History:  He has a past medical history of CVA (cerebral vascular accident) (Multi), DM (diabetes mellitus) (Multi), HLD (hyperlipidemia), and HTN (hypertension).    Past Surgical History:  He has a past surgical history that includes CT angio abdomen pelvis w and or wo IV IV contrast (1/30/2023) and Cardiac catheterization (N/A, 10/2/2023).      Social History:  He reports that he has quit smoking. His smoking use included cigarettes. He has never used smokeless tobacco. He reports current alcohol use of about 6.0 standard drinks of alcohol per week. He reports that he does not use drugs.    Family History:  No family history on file.     Allergies:  Penicillins and Penicillin v    Outpatient Medications:  Current Outpatient Medications   Medication Instructions    acetaminophen (Tylenol) 325 mg tablet 2 tablets, oral, Every 4 hours PRN    aspirin 81 mg, oral, Daily    atorvastatin (LIPITOR) 80 mg, oral, Daily    carvedilol (COREG) 3.125 mg, oral, 2 times daily (morning and late afternoon)    collagenase (SantyL) 250 unit/gram ointment     cyanocobalamin (VITAMIN B-12) 1,000 mcg, oral, Daily    cyclobenzaprine (FLEXERIL) 5 mg, oral, Nightly    empagliflozin (Jardiance) 10 mg 1 tablet, oral, Daily    ergocalciferol (VITAMIN D-2) 50,000 Units, oral, Once  "Weekly    FeroSuL tablet 1 tablet, oral, Daily, WITH A MEAL    folic acid (FOLVITE) 1 mg, oral, Daily    gabapentin (Neurontin) 300 mg capsule 1 capsule, oral, Every 12 hours scheduled (0630,1830)    gabapentin (NEURONTIN) 100 mg, oral, 3 times daily    losartan (COZAAR) 100 mg, oral, Daily    melatonin 9 mg, oral, Nightly    metFORMIN (GLUCOPHAGE) 500 mg, oral, 2 times daily (morning and late afternoon)    pantoprazole (PROTONIX) 40 mg, oral, Daily before breakfast    traMADol (Ultram) 50 mg tablet 1 tablet, oral, Every 8 hours PRN        ROS:  A 14 point review of systems was done and is negative other than as stated in HPI    Vitals:      10/4/2023     2:49 AM 10/4/2023     6:00 AM 10/4/2023     8:40 AM 10/10/2023    11:34 AM 11/3/2023     1:19 PM 1/16/2024    10:56 AM 8/2/2024     9:05 AM   Vitals   Systolic 135  127 100 186 148 120   Diastolic 78  60 78 74 68 65   BP Location   Right arm  Right arm     Heart Rate 66  68 63 75 62 72   Temp 36.6 °C (97.9 °F)  36.4 °C (97.5 °F)       Resp 16  16       Height    1.803 m (5' 11\")  1.778 m (5' 10\") 1.778 m (5' 10\")   Weight (lb)  196.65  203 201 197 --   BMI  27.43 kg/m2  28.31 kg/m2 28.03 kg/m2 28.27 kg/m2 28.27 kg/m2   BSA (m2)  2.11 m2  2.15 m2 2.14 m2 2.1 m2 2.1 m2        Physical Exam:   Constitutional: Cooperative, in no acute distress, alert, appears stated age.  Skin: Skin color, texture, turgor normal. No rashes or lesions.  Head: Normocephalic. No masses, lesions, tenderness or abnormalities  Eyes: Extraocular movements are grossly intact.  Mouth and throat: Mucous membranes moist  Neck: Neck supple, no carotid bruits, no JVD  Respiratory: Lungs clear to auscultation, no wheezing or rhonchi, no use of accessory muscles  Chest wall: No scars, normal excursion with respiration  Cardiovascular: Regular rhythm, +murmur  Gastrointestinal: Abdomen soft, nontender. Bowel sounds normal.  Musculoskeletal: Strength equal in upper extremities  Extremities: No pitting " edema  Neurologic: Sensation grossly intact, alert and oriented ×3      Intake/Output:   No intake/output data recorded.    Outpatient Medications  Current Outpatient Medications on File Prior to Visit   Medication Sig Dispense Refill    acetaminophen (Tylenol) 325 mg tablet Take 2 tablets (650 mg) by mouth every 4 hours if needed.      aspirin 81 mg chewable tablet Chew 1 tablet (81 mg) once daily. (Patient not taking: Reported on 8/2/2024) 30 tablet 3    atorvastatin (Lipitor) 80 mg tablet take 1 tablet by mouth once daily 90 tablet 1    carvedilol (Coreg) 3.125 mg tablet take 1 tablet by mouth twice a day with meals 120 tablet 1    collagenase (SantyL) 250 unit/gram ointment       cyanocobalamin (Vitamin B-12) 1,000 mcg tablet Take 1 tablet (1,000 mcg) by mouth once daily. 30 tablet 90    cyclobenzaprine (Flexeril) 5 mg tablet Take 1 tablet (5 mg) by mouth once daily at bedtime.      empagliflozin (Jardiance) 10 mg Take 1 tablet (10 mg) by mouth once daily.      ergocalciferol (Vitamin D-2) 1.25 MG (62597 UT) capsule Take 1 capsule (50,000 Units) by mouth 1 (one) time per week.      FeroSuL tablet take 1 tablet by mouth once daily WITH A MEAL 90 tablet 1    folic acid (Folvite) 1 mg tablet take 1 tablet by mouth once daily 20 tablet 0    gabapentin (Neurontin) 100 mg capsule Take 1 capsule (100 mg) by mouth 3 times a day.      gabapentin (Neurontin) 300 mg capsule Take 1 capsule (300 mg) by mouth every 12 hours.      losartan (Cozaar) 50 mg tablet take 2 tablets by mouth once daily 90 tablet 1    melatonin 3 mg tablet Take 3 tablets (9 mg) by mouth once daily at bedtime.      metFORMIN (Glucophage) 500 mg tablet Take 1 tablet (500 mg) by mouth 2 times daily (morning and late afternoon).      pantoprazole (ProtoNix) 40 mg EC tablet Take 1 tablet (40 mg) by mouth once daily in the morning. Take before meals.      traMADol (Ultram) 50 mg tablet Take 1 tablet (50 mg) by mouth every 8 hours if needed for pain.        No current facility-administered medications on file prior to visit.       Labs: (past 26 weeks)  No results found for this or any previous visit (from the past 26 weeks).    ECG  Encounter Date: 08/02/24   ECG 12 Lead    Narrative    Sinus rhythm, possible inferior infarct age undetermined, heart rate 72   bpm.       Echocardiogram  No results found for this or any previous visit from the past 1095 days.      CV Studies:  EKG:  Encounter Date: 08/02/24   ECG 12 Lead    Narrative    Sinus rhythm, possible inferior infarct age undetermined, heart rate 72   bpm.     Echocardiogram:   [place] [address]  Phone [phone] Fax [phone]    TRANSTHORACIC ECHOCARDIOGRAM REPORT      Patient Name:     KVNG Sandy Physician:  [id number] Yesenia Seth MD  Study Date:       1/9/2023           Referring           SILVIA ALCALA  Physician:  MRN/PID:          [id number]           PCP:  Accession/Order#: [id number]          [facility] Echo Lab  Location:  YOB: 1960          Fellow:  Gender:           M                  Nurse:              Analilia Zuluaga RN PRN  Admit Date:       1/7/2023           Sonographer:        Madhuri Salazar SKYLAR  Admission Status: Inpatient -        Additional Staff:  Routine  Height:           180.34 cm          CC Report to:  Weight:           90.27 kg           Study Type:         Echocardiogram  BSA:              2.10 m2  Blood Pressure: 167 /81 mmHg    Diagnosis/ICD: G45.8-Other transient cerebral ischemic attacks and related  syndromes  Indication:    Stroke  Procedure/CPT: Echo Complete w Full [id number]    Patient History:  Pertinent History: CVA, HLD, DMII.    Study Detail: The following Echo studies were performed: 2D, M-Mode, Doppler and  color flow. Technically challenging study due to poor acoustic  windows and prominent lung artifact. Optison used as a  contrast  agent for endocardial border definition and agitated saline used  as a contrast agent for intraseptal flow evaluation. Total  contrast used for this procedure was 3 mL via IV push.      PHYSICIAN INTERPRETATION:  Left Ventricle: Left ventricular systolic function is normal, with an estimated ejection fraction of 60-65%. There are no regional wall motion abnormalities. The left ventricular cavity size is normal. Spectral Doppler shows a normal pattern of left ventricular diastolic filling.  Left Atrium: The left atrium is normal in size. A bubble study using agitated saline was performed. Bubble study is negative.  Right Ventricle: The right ventricle was not well visualized. There is normal right ventricular global systolic function. There is a possible device noted in the right ventricle.  Right Atrium: The right atrium is normal in size.  Aortic Valve: The aortic valve is probably trileaflet. There is evidence of mild aortic valve stenosis.  The aortic valve dimensionless index is 0.58. There is no evidence of aortic valve regurgitation. The peak instantaneous gradient of the aortic valve is 16.3 mmHg. The mean gradient of the aortic valve is 9.0 mmHg.  Mitral Valve: The mitral valve is normal in structure. There is no evidence of mitral valve regurgitation.  Tricuspid Valve: The tricuspid valve is structurally normal. No evidence of tricuspid regurgitation.  Pulmonic Valve: The pulmonic valve is structurally normal. There is physiologic pulmonic valve regurgitation.  Pericardium: There is no pericardial effusion noted.  Aorta: The aortic root is abnormal. There is mild dilatation of the ascending aorta.      CONCLUSIONS:  1. Left ventricular systolic function is normal with a 60-65% estimated ejection fraction.  2. Mild aortic valve stenosis.    QUANTITATIVE DATA SUMMARY:  2D MEASUREMENTS:  Normal Ranges:  Ao Root d:     3.70 cm   (2.0-3.7cm)  LAs:           4.00 cm   (2.7-4.0cm)  IVSd:          1.24 cm    (0.6-1.1cm)  LVPWd:         1.10 cm   (0.6-1.1cm)  LVIDd:         4.51 cm   (3.9-5.9cm)  LVIDs:         3.09 cm  LV Mass Index: 91.1 g/m2  LV % FS        31.5 %    LA VOLUME:  Normal Ranges:  LA Vol A4C:        40.1 ml    (22+/-6mL/m2)  LA Vol A2C:        50.5 ml  LA Vol BP:         47.0 ml  LA Vol Index A4C:  19.1ml/m2  LA Vol Index A2C:  24.0 ml/m2  LA Vol Index BP:   22.3 ml/m2  LA Area A4C:       16.4 cm2  LA Area A2C:       19.2 cm2  LA Major Axis A4C: 5.7 cm  LA Major Axis A2C: 6.2 cm  LA Volume Index:   22.0 ml/m2    RA VOLUME BY A/L METHOD:  Normal Ranges:  RA Area A4C: 14.3 cm2    AORTA MEASUREMENTS:  Normal Ranges:  Ao Sinus, d: 3.70 cm (2.1-3.5cm)  Ao STJ, d:   3.10 cm (1.7-3.4cm)  Asc Ao, d:   4.00 cm (2.1-3.4cm)    LV SYSTOLIC FUNCTION BY 2D PLANIMETRY (MOD):  Normal Ranges:  EF-A4C View: 73.9 % (>=55%)  EF-A2C View: 62.8 %  EF-Biplane:  67.0 %    LV DIASTOLIC FUNCTION:  Normal Ranges:  MV Peak E:        0.80 m/s    (0.7-1.2 m/s)  MV Peak A:        1.28 m/s    (0.42-0.7 m/s)  E/A Ratio:        0.63        (1.0-2.2)  MV e'             0.06 m/s    (>8.0)  MV lateral e'     0.08 m/s  MV medial e'      0.05 m/s  MV A Dur:         108.00 msec  E/e' Ratio:       12.38       (<8.0)  PulmV A Revs Dur: 148.00 msec    AORTIC VALVE:  Normal Ranges:  AoV Vmax:                2.02 m/s  (<=1.7m/s)  AoV Peak P.3 mmHg (<20mmHg)  AoV Mean P.0 mmHg  (1.7-11.5mmHg)  LVOT Max Miki:            1.24 m/s  (<=1.1m/s)  AoV VTI:                 35.20 cm  (18-25cm)  LVOT VTI:                20.50 cm  LVOT Diameter:           2.00 cm   (1.8-2.4cm)  AoV Area, VTI:           1.83 cm2  (2.5-5.5cm2)  AoV Area,Vmax:           1.93 cm2  (2.5-4.5cm2)  AoV Dimensionless Index: 0.58    RIGHT VENTRICLE:  RV 1   3.5 cm  TAPSE: 22.5 mm  RV s'  0.19 m/s    TRICUSPID VALVE/RVSP:  Normal Ranges:  TV E Vmax: 0.70 m/s (0.3-0.7m/s)  TV A Vmax: 1.08 m/s  IVC Diam:  1.40 cm    Pulmonary Veins:  PulmV A Revs Dur:  148.00 msec      [id number] Yesenia Seth MD  Electronically signed on 1/9/2023 at 11:47:36 AM         Final     Stress Testing IMGRESULT([id number]): No results found for this or any previous visit from the past 1825 days.    Cardiac Catheterization: No results found for this or any previous visit from the past 1825 days.  No results found for this or any previous visit from the past 3650 days.     Cardiac Scoring: No results found for this or any previous visit from the past 1825 days.    AAA : No results found for this or any previous visit from the past 1825 days.    OTHER: No results found for this or any previous visit from the past 1825 days.    LAST IMAGING RESULTS  ECG 12 Lead  Sinus rhythm, possible inferior infarct age undetermined, heart rate 72   bpm.    Problem List Items Addressed This Visit       HTN (hypertension) (Chronic)    HLD (hyperlipidemia) (Chronic)    Diabetes mellitus type II, non insulin dependent (Multi)    Aortic valve disease    Coronary artery disease involving native coronary artery of native heart without angina pectoris - Primary    Bilateral carotid artery stenosis    History of subarachnoid hemorrhage          Umesh Newby DO, FACC, FACOI

## 2024-12-06 ENCOUNTER — APPOINTMENT (OUTPATIENT)
Dept: CARDIOLOGY | Facility: CLINIC | Age: 64
End: 2024-12-06
Payer: MEDICARE

## 2024-12-06 VITALS
DIASTOLIC BLOOD PRESSURE: 72 MMHG | WEIGHT: 197 LBS | SYSTOLIC BLOOD PRESSURE: 130 MMHG | HEIGHT: 70 IN | BODY MASS INDEX: 28.2 KG/M2 | HEART RATE: 87 BPM

## 2024-12-06 DIAGNOSIS — E11.9 DIABETES MELLITUS TYPE II, NON INSULIN DEPENDENT (MULTI): ICD-10-CM

## 2024-12-06 DIAGNOSIS — E78.5 HYPERLIPIDEMIA, UNSPECIFIED HYPERLIPIDEMIA TYPE: Chronic | ICD-10-CM

## 2024-12-06 DIAGNOSIS — Z86.79 HISTORY OF SUBARACHNOID HEMORRHAGE: ICD-10-CM

## 2024-12-06 DIAGNOSIS — I65.23 BILATERAL CAROTID ARTERY STENOSIS: ICD-10-CM

## 2024-12-06 DIAGNOSIS — I35.9 AORTIC VALVE DISEASE: ICD-10-CM

## 2024-12-06 DIAGNOSIS — D50.8 IRON DEFICIENCY ANEMIA SECONDARY TO INADEQUATE DIETARY IRON INTAKE: ICD-10-CM

## 2024-12-06 DIAGNOSIS — I25.10 CORONARY ARTERY DISEASE INVOLVING NATIVE CORONARY ARTERY OF NATIVE HEART WITHOUT ANGINA PECTORIS: Primary | ICD-10-CM

## 2024-12-06 DIAGNOSIS — I21.4 NSTEMI (NON-ST ELEVATED MYOCARDIAL INFARCTION) (MULTI): ICD-10-CM

## 2024-12-06 DIAGNOSIS — E78.2 MIXED HYPERLIPIDEMIA: Chronic | ICD-10-CM

## 2024-12-06 DIAGNOSIS — E78.00 PURE HYPERCHOLESTEROLEMIA: Chronic | ICD-10-CM

## 2024-12-06 DIAGNOSIS — I10 PRIMARY HYPERTENSION: Chronic | ICD-10-CM

## 2024-12-06 DIAGNOSIS — D64.9 ANEMIA, UNSPECIFIED TYPE: ICD-10-CM

## 2024-12-06 PROCEDURE — 3078F DIAST BP <80 MM HG: CPT | Performed by: INTERNAL MEDICINE

## 2024-12-06 PROCEDURE — 3075F SYST BP GE 130 - 139MM HG: CPT | Performed by: INTERNAL MEDICINE

## 2024-12-06 PROCEDURE — 1036F TOBACCO NON-USER: CPT | Performed by: INTERNAL MEDICINE

## 2024-12-06 PROCEDURE — 1159F MED LIST DOCD IN RCRD: CPT | Performed by: INTERNAL MEDICINE

## 2024-12-06 PROCEDURE — 93000 ELECTROCARDIOGRAM COMPLETE: CPT | Performed by: INTERNAL MEDICINE

## 2024-12-06 PROCEDURE — 4010F ACE/ARB THERAPY RXD/TAKEN: CPT | Performed by: INTERNAL MEDICINE

## 2024-12-06 PROCEDURE — 3008F BODY MASS INDEX DOCD: CPT | Performed by: INTERNAL MEDICINE

## 2024-12-06 PROCEDURE — 99214 OFFICE O/P EST MOD 30 MIN: CPT | Performed by: INTERNAL MEDICINE

## 2024-12-09 ENCOUNTER — TELEPHONE (OUTPATIENT)
Dept: PRIMARY CARE CLINIC | Age: 64
End: 2024-12-09

## 2024-12-09 DIAGNOSIS — I73.9 PERIPHERAL ARTERIAL DISEASE (HCC): ICD-10-CM

## 2024-12-09 DIAGNOSIS — Z86.73 HISTORY OF STROKE: ICD-10-CM

## 2024-12-09 DIAGNOSIS — Z76.0 MEDICATION REFILL: ICD-10-CM

## 2024-12-09 NOTE — TELEPHONE ENCOUNTER
Name of Medication(s) Requested:  Requested Prescriptions     Pending Prescriptions Disp Refills    gabapentin (NEURONTIN) 300 MG capsule 180 capsule 1     Sig: Take 1 capsule by mouth 2 times daily for 180 days. Intended supply: 90 days    atorvastatin (LIPITOR) 40 MG tablet 180 tablet 3     Sig: Take 2 tablets by mouth daily       Medication is on current medication list Yes    Dosage and directions were verified? Yes    Quantity verified: 90 day supply     Pharmacy Verified?  Yes    Last Appointment:  9/30/2024    Future appts:  Future Appointments   Date Time Provider Department Center   1/6/2025  1:30 PM Genet Kraus DO [facility] DE[facility]        (If no appt send self scheduling link. .REFILLAPPT)  Scheduling request sent?     [] Yes  [x] No    Does patient need updated?  [] Yes  [x] No

## 2024-12-09 NOTE — TELEPHONE ENCOUNTER
Pt called today for refills:    Gabapentin  Atorvastatin (pt said he still has this scrip at lower dose from previous pcp, advised pt he should throw the old meds away, take dose that SB prescribed)    [facility] [address] [facility]

## 2024-12-10 RX ORDER — ATORVASTATIN CALCIUM 80 MG/1
80 TABLET, FILM COATED ORAL DAILY
Qty: 90 TABLET | Refills: 3 | Status: SHIPPED | OUTPATIENT
Start: 2024-12-10

## 2024-12-10 RX ORDER — GABAPENTIN 300 MG/1
300 CAPSULE ORAL 2 TIMES DAILY
Qty: 180 CAPSULE | Refills: 1 | Status: SHIPPED | OUTPATIENT
Start: 2024-12-10 | End: 2025-06-08

## 2024-12-23 ENCOUNTER — APPOINTMENT (OUTPATIENT)
Dept: CT IMAGING | Age: 64
DRG: 728 | End: 2024-12-23
Payer: MEDICARE

## 2024-12-23 ENCOUNTER — HOSPITAL ENCOUNTER (INPATIENT)
Age: 64
LOS: 8 days | Discharge: NURSING, REHAB OR LONG-TERM CARE | DRG: 728 | End: 2024-12-31
Attending: EMERGENCY MEDICINE | Admitting: FAMILY MEDICINE
Payer: MEDICARE

## 2024-12-23 ENCOUNTER — APPOINTMENT (OUTPATIENT)
Dept: GENERAL RADIOLOGY | Age: 64
DRG: 728 | End: 2024-12-23
Payer: MEDICARE

## 2024-12-23 DIAGNOSIS — K40.90 INGUINAL HERNIA WITHOUT OBSTRUCTION OR GANGRENE, RECURRENCE NOT SPECIFIED, UNSPECIFIED LATERALITY: ICD-10-CM

## 2024-12-23 DIAGNOSIS — I63.9 CEREBROVASCULAR ACCIDENT (CVA), UNSPECIFIED MECHANISM (HCC): ICD-10-CM

## 2024-12-23 DIAGNOSIS — I69.354 HEMIPARESIS AFFECTING LEFT SIDE AS LATE EFFECT OF CEREBROVASCULAR ACCIDENT (HCC): ICD-10-CM

## 2024-12-23 DIAGNOSIS — S22.42XA CLOSED FRACTURE OF MULTIPLE RIBS OF LEFT SIDE, INITIAL ENCOUNTER: ICD-10-CM

## 2024-12-23 DIAGNOSIS — E87.1 HYPONATREMIA: ICD-10-CM

## 2024-12-23 DIAGNOSIS — S32.009A CLOSED FRACTURE OF TRANSVERSE PROCESS OF LUMBAR VERTEBRA, INITIAL ENCOUNTER (HCC): ICD-10-CM

## 2024-12-23 DIAGNOSIS — N42.83 PROSTATIC CYST: Primary | ICD-10-CM

## 2024-12-23 PROBLEM — E11.621 TYPE 2 DIABETES MELLITUS WITH LEFT DIABETIC FOOT ULCER (HCC): Status: RESOLVED | Noted: 2022-09-20 | Resolved: 2024-12-23

## 2024-12-23 PROBLEM — L97.424 DIABETIC ULCER OF LEFT HEEL ASSOCIATED WITH TYPE 2 DIABETES MELLITUS, WITH NECROSIS OF BONE (HCC): Chronic | Status: RESOLVED | Noted: 2021-12-21 | Resolved: 2024-12-23

## 2024-12-23 PROBLEM — N41.2 PROSTATE ABSCESS: Status: ACTIVE | Noted: 2024-12-23

## 2024-12-23 PROBLEM — I70.244 ATHEROSCLEROSIS OF NATIVE ARTERY OF LEFT LOWER EXTREMITY WITH ULCERATION OF HEEL (HCC): Status: RESOLVED | Noted: 2021-12-21 | Resolved: 2024-12-23

## 2024-12-23 PROBLEM — E11.621 DIABETIC ULCER OF LEFT HEEL ASSOCIATED WITH TYPE 2 DIABETES MELLITUS, WITH NECROSIS OF BONE (HCC): Chronic | Status: RESOLVED | Noted: 2021-12-21 | Resolved: 2024-12-23

## 2024-12-23 PROBLEM — M86.9 OSTEOMYELITIS OF LEFT LOWER EXTREMITY: Status: RESOLVED | Noted: 2021-05-14 | Resolved: 2024-12-23

## 2024-12-23 PROBLEM — F10.20 ALCOHOLISM (HCC): Status: ACTIVE | Noted: 2024-12-23

## 2024-12-23 PROBLEM — L97.529 TYPE 2 DIABETES MELLITUS WITH LEFT DIABETIC FOOT ULCER (HCC): Status: RESOLVED | Noted: 2022-09-20 | Resolved: 2024-12-23

## 2024-12-23 LAB
ALBUMIN SERPL-MCNC: 3.2 G/DL (ref 3.5–5.2)
ALP SERPL-CCNC: 129 U/L (ref 40–129)
ALT SERPL-CCNC: 17 U/L (ref 0–40)
AMPHET UR QL SCN: NEGATIVE
ANION GAP SERPL CALCULATED.3IONS-SCNC: 15 MMOL/L (ref 7–16)
AST SERPL-CCNC: 23 U/L (ref 0–39)
BACTERIA URNS QL MICRO: ABNORMAL
BARBITURATES UR QL SCN: NEGATIVE
BASOPHILS # BLD: 0.07 K/UL (ref 0–0.2)
BASOPHILS NFR BLD: 0 % (ref 0–2)
BENZODIAZ UR QL: NEGATIVE
BILIRUB SERPL-MCNC: 1 MG/DL (ref 0–1.2)
BILIRUB UR QL STRIP: NEGATIVE
BUN SERPL-MCNC: 27 MG/DL (ref 6–23)
BUPRENORPHINE UR QL: NEGATIVE
CALCIUM SERPL-MCNC: 9.3 MG/DL (ref 8.6–10.2)
CANNABINOIDS UR QL SCN: NEGATIVE
CHLORIDE SERPL-SCNC: 90 MMOL/L (ref 98–107)
CLARITY UR: ABNORMAL
CO2 SERPL-SCNC: 20 MMOL/L (ref 22–29)
COCAINE UR QL SCN: NEGATIVE
COLOR UR: YELLOW
CREAT SERPL-MCNC: 0.9 MG/DL (ref 0.7–1.2)
EOSINOPHIL # BLD: 0.22 K/UL (ref 0.05–0.5)
EOSINOPHILS RELATIVE PERCENT: 1 % (ref 0–6)
ERYTHROCYTE [DISTWIDTH] IN BLOOD BY AUTOMATED COUNT: 14.5 % (ref 11.5–15)
ETHANOLAMINE SERPL-MCNC: <10 MG/DL (ref 0–0.08)
FENTANYL UR QL: NEGATIVE
GFR, ESTIMATED: >90 ML/MIN/1.73M2
GLUCOSE BLD-MCNC: 198 MG/DL (ref 74–99)
GLUCOSE SERPL-MCNC: 146 MG/DL (ref 74–99)
GLUCOSE UR STRIP-MCNC: >=1000 MG/DL
HCT VFR BLD AUTO: 29 % (ref 37–54)
HGB BLD-MCNC: 10.2 G/DL (ref 12.5–16.5)
HGB UR QL STRIP.AUTO: ABNORMAL
IMM GRANULOCYTES # BLD AUTO: 0.27 K/UL (ref 0–0.58)
IMM GRANULOCYTES NFR BLD: 2 % (ref 0–5)
KETONES UR STRIP-MCNC: 15 MG/DL
LEUKOCYTE ESTERASE UR QL STRIP: ABNORMAL
LIPASE SERPL-CCNC: 18 U/L (ref 13–60)
LYMPHOCYTES NFR BLD: 0.9 K/UL (ref 1.5–4)
LYMPHOCYTES RELATIVE PERCENT: 5 % (ref 20–42)
MCH RBC QN AUTO: 31.1 PG (ref 26–35)
MCHC RBC AUTO-ENTMCNC: 35.2 G/DL (ref 32–34.5)
MCV RBC AUTO: 88.4 FL (ref 80–99.9)
METHADONE UR QL: NEGATIVE
MONOCYTES NFR BLD: 0.9 K/UL (ref 0.1–0.95)
MONOCYTES NFR BLD: 5 % (ref 2–12)
NEUTROPHILS NFR BLD: 86 % (ref 43–80)
NEUTS SEG NFR BLD: 14.17 K/UL (ref 1.8–7.3)
NITRITE UR QL STRIP: POSITIVE
OPIATES UR QL SCN: NEGATIVE
OSMOLALITY SERPL: 282 MOSM/KG (ref 285–310)
OXYCODONE UR QL SCN: NEGATIVE
PCP UR QL SCN: NEGATIVE
PH UR STRIP: 6 [PH] (ref 5–9)
PLATELET # BLD AUTO: 360 K/UL (ref 130–450)
PMV BLD AUTO: 9.1 FL (ref 7–12)
POTASSIUM SERPL-SCNC: 5 MMOL/L (ref 3.5–5)
PROT SERPL-MCNC: 7.8 G/DL (ref 6.4–8.3)
PROT UR STRIP-MCNC: ABNORMAL MG/DL
RBC # BLD AUTO: 3.28 M/UL (ref 3.8–5.8)
RBC #/AREA URNS HPF: ABNORMAL /HPF
SODIUM SERPL-SCNC: 125 MMOL/L (ref 132–146)
SODIUM UR-SCNC: 38 MMOL/L
SP GR UR STRIP: 1.01 (ref 1–1.03)
TEST INFORMATION: NORMAL
UROBILINOGEN UR STRIP-ACNC: >8 EU/DL (ref 0–1)
WBC #/AREA URNS HPF: ABNORMAL /HPF
WBC OTHER # BLD: 16.5 K/UL (ref 4.5–11.5)

## 2024-12-23 PROCEDURE — 99285 EMERGENCY DEPT VISIT HI MDM: CPT

## 2024-12-23 PROCEDURE — 71045 X-RAY EXAM CHEST 1 VIEW: CPT

## 2024-12-23 PROCEDURE — 2580000003 HC RX 258

## 2024-12-23 PROCEDURE — 74177 CT ABD & PELVIS W/CONTRAST: CPT

## 2024-12-23 PROCEDURE — 2580000003 HC RX 258: Performed by: FAMILY MEDICINE

## 2024-12-23 PROCEDURE — 80307 DRUG TEST PRSMV CHEM ANLYZR: CPT

## 2024-12-23 PROCEDURE — 83690 ASSAY OF LIPASE: CPT

## 2024-12-23 PROCEDURE — 99223 1ST HOSP IP/OBS HIGH 75: CPT | Performed by: FAMILY MEDICINE

## 2024-12-23 PROCEDURE — 6360000002 HC RX W HCPCS: Performed by: FAMILY MEDICINE

## 2024-12-23 PROCEDURE — 96374 THER/PROPH/DIAG INJ IV PUSH: CPT

## 2024-12-23 PROCEDURE — 36415 COLL VENOUS BLD VENIPUNCTURE: CPT

## 2024-12-23 PROCEDURE — 83935 ASSAY OF URINE OSMOLALITY: CPT

## 2024-12-23 PROCEDURE — 6370000000 HC RX 637 (ALT 250 FOR IP): Performed by: FAMILY MEDICINE

## 2024-12-23 PROCEDURE — 82947 ASSAY GLUCOSE BLOOD QUANT: CPT

## 2024-12-23 PROCEDURE — 81001 URINALYSIS AUTO W/SCOPE: CPT

## 2024-12-23 PROCEDURE — 6360000002 HC RX W HCPCS

## 2024-12-23 PROCEDURE — 6370000000 HC RX 637 (ALT 250 FOR IP)

## 2024-12-23 PROCEDURE — 80053 COMPREHEN METABOLIC PANEL: CPT

## 2024-12-23 PROCEDURE — 83930 ASSAY OF BLOOD OSMOLALITY: CPT

## 2024-12-23 PROCEDURE — 93005 ELECTROCARDIOGRAM TRACING: CPT | Performed by: FAMILY MEDICINE

## 2024-12-23 PROCEDURE — 51798 US URINE CAPACITY MEASURE: CPT

## 2024-12-23 PROCEDURE — 2060000000 HC ICU INTERMEDIATE R&B

## 2024-12-23 PROCEDURE — 85025 COMPLETE CBC W/AUTO DIFF WBC: CPT

## 2024-12-23 PROCEDURE — 84300 ASSAY OF URINE SODIUM: CPT

## 2024-12-23 PROCEDURE — 2500000003 HC RX 250 WO HCPCS: Performed by: FAMILY MEDICINE

## 2024-12-23 PROCEDURE — G0480 DRUG TEST DEF 1-7 CLASSES: HCPCS

## 2024-12-23 PROCEDURE — 6360000004 HC RX CONTRAST MEDICATION: Performed by: RADIOLOGY

## 2024-12-23 RX ORDER — SODIUM CHLORIDE 0.9 % (FLUSH) 0.9 %
5-40 SYRINGE (ML) INJECTION EVERY 12 HOURS SCHEDULED
Status: DISCONTINUED | OUTPATIENT
Start: 2024-12-23 | End: 2024-12-31 | Stop reason: HOSPADM

## 2024-12-23 RX ORDER — LORAZEPAM 1 MG/1
4 TABLET ORAL
Status: DISCONTINUED | OUTPATIENT
Start: 2024-12-23 | End: 2024-12-28

## 2024-12-23 RX ORDER — POLYETHYLENE GLYCOL 3350 17 G/17G
17 POWDER, FOR SOLUTION ORAL DAILY PRN
Status: DISCONTINUED | OUTPATIENT
Start: 2024-12-23 | End: 2024-12-23

## 2024-12-23 RX ORDER — FOLIC ACID 1 MG/1
1 TABLET ORAL DAILY
Status: DISCONTINUED | OUTPATIENT
Start: 2024-12-23 | End: 2024-12-31 | Stop reason: HOSPADM

## 2024-12-23 RX ORDER — GABAPENTIN 300 MG/1
300 CAPSULE ORAL 2 TIMES DAILY
Status: DISCONTINUED | OUTPATIENT
Start: 2024-12-23 | End: 2024-12-31 | Stop reason: HOSPADM

## 2024-12-23 RX ORDER — INSULIN LISPRO 100 [IU]/ML
0-4 INJECTION, SOLUTION INTRAVENOUS; SUBCUTANEOUS
Status: DISCONTINUED | OUTPATIENT
Start: 2024-12-23 | End: 2024-12-31 | Stop reason: HOSPADM

## 2024-12-23 RX ORDER — TRAMADOL HYDROCHLORIDE 50 MG/1
50 TABLET ORAL EVERY 8 HOURS PRN
Status: DISCONTINUED | OUTPATIENT
Start: 2024-12-23 | End: 2024-12-31 | Stop reason: HOSPADM

## 2024-12-23 RX ORDER — CARVEDILOL 3.12 MG/1
3.12 TABLET ORAL 2 TIMES DAILY WITH MEALS
Status: DISCONTINUED | OUTPATIENT
Start: 2024-12-24 | End: 2024-12-31 | Stop reason: HOSPADM

## 2024-12-23 RX ORDER — LOSARTAN POTASSIUM 50 MG/1
100 TABLET ORAL DAILY
Status: DISCONTINUED | OUTPATIENT
Start: 2024-12-24 | End: 2024-12-31 | Stop reason: HOSPADM

## 2024-12-23 RX ORDER — SODIUM CHLORIDE 9 MG/ML
INJECTION, SOLUTION INTRAVENOUS CONTINUOUS
Status: DISCONTINUED | OUTPATIENT
Start: 2024-12-23 | End: 2024-12-26

## 2024-12-23 RX ORDER — ONDANSETRON 2 MG/ML
4 INJECTION INTRAMUSCULAR; INTRAVENOUS EVERY 6 HOURS PRN
Status: DISCONTINUED | OUTPATIENT
Start: 2024-12-23 | End: 2024-12-23

## 2024-12-23 RX ORDER — SODIUM CHLORIDE 0.9 % (FLUSH) 0.9 %
5-40 SYRINGE (ML) INJECTION PRN
Status: DISCONTINUED | OUTPATIENT
Start: 2024-12-23 | End: 2024-12-31 | Stop reason: HOSPADM

## 2024-12-23 RX ORDER — LORAZEPAM 2 MG/ML
4 INJECTION INTRAMUSCULAR
Status: DISCONTINUED | OUTPATIENT
Start: 2024-12-23 | End: 2024-12-23

## 2024-12-23 RX ORDER — ENOXAPARIN SODIUM 100 MG/ML
40 INJECTION SUBCUTANEOUS DAILY
Status: DISCONTINUED | OUTPATIENT
Start: 2024-12-23 | End: 2024-12-31 | Stop reason: HOSPADM

## 2024-12-23 RX ORDER — ACETAMINOPHEN 650 MG/1
650 SUPPOSITORY RECTAL EVERY 6 HOURS PRN
Status: DISCONTINUED | OUTPATIENT
Start: 2024-12-23 | End: 2024-12-31 | Stop reason: HOSPADM

## 2024-12-23 RX ORDER — LORAZEPAM 2 MG/ML
1 INJECTION INTRAMUSCULAR
Status: DISCONTINUED | OUTPATIENT
Start: 2024-12-23 | End: 2024-12-23

## 2024-12-23 RX ORDER — PANTOPRAZOLE SODIUM 40 MG/1
40 TABLET, DELAYED RELEASE ORAL DAILY
Status: DISCONTINUED | OUTPATIENT
Start: 2024-12-23 | End: 2024-12-31 | Stop reason: HOSPADM

## 2024-12-23 RX ORDER — SODIUM CHLORIDE 9 MG/ML
INJECTION, SOLUTION INTRAVENOUS PRN
Status: DISCONTINUED | OUTPATIENT
Start: 2024-12-23 | End: 2024-12-31 | Stop reason: HOSPADM

## 2024-12-23 RX ORDER — IOPAMIDOL 755 MG/ML
75 INJECTION, SOLUTION INTRAVASCULAR
Status: COMPLETED | OUTPATIENT
Start: 2024-12-23 | End: 2024-12-23

## 2024-12-23 RX ORDER — ACETAMINOPHEN 325 MG/1
650 TABLET ORAL EVERY 6 HOURS PRN
Status: DISCONTINUED | OUTPATIENT
Start: 2024-12-23 | End: 2024-12-31 | Stop reason: HOSPADM

## 2024-12-23 RX ORDER — LORAZEPAM 2 MG/ML
2 INJECTION INTRAMUSCULAR
Status: DISCONTINUED | OUTPATIENT
Start: 2024-12-23 | End: 2024-12-23

## 2024-12-23 RX ORDER — DEXTROSE MONOHYDRATE 100 MG/ML
INJECTION, SOLUTION INTRAVENOUS CONTINUOUS PRN
Status: DISCONTINUED | OUTPATIENT
Start: 2024-12-23 | End: 2024-12-31 | Stop reason: HOSPADM

## 2024-12-23 RX ORDER — POTASSIUM CHLORIDE 1500 MG/1
40 TABLET, EXTENDED RELEASE ORAL PRN
Status: DISCONTINUED | OUTPATIENT
Start: 2024-12-23 | End: 2024-12-23

## 2024-12-23 RX ORDER — MAGNESIUM SULFATE IN WATER 40 MG/ML
2000 INJECTION, SOLUTION INTRAVENOUS PRN
Status: DISCONTINUED | OUTPATIENT
Start: 2024-12-23 | End: 2024-12-23

## 2024-12-23 RX ORDER — CIPROFLOXACIN 2 MG/ML
400 INJECTION, SOLUTION INTRAVENOUS EVERY 12 HOURS
Status: DISCONTINUED | OUTPATIENT
Start: 2024-12-23 | End: 2024-12-30

## 2024-12-23 RX ORDER — POTASSIUM CHLORIDE 7.45 MG/ML
10 INJECTION INTRAVENOUS PRN
Status: DISCONTINUED | OUTPATIENT
Start: 2024-12-23 | End: 2024-12-23

## 2024-12-23 RX ORDER — FERROUS SULFATE 325(65) MG
325 TABLET ORAL
Status: DISCONTINUED | OUTPATIENT
Start: 2024-12-24 | End: 2024-12-31 | Stop reason: HOSPADM

## 2024-12-23 RX ORDER — LORAZEPAM 1 MG/1
2 TABLET ORAL
Status: DISCONTINUED | OUTPATIENT
Start: 2024-12-23 | End: 2024-12-28

## 2024-12-23 RX ORDER — LORAZEPAM 1 MG/1
1 TABLET ORAL
Status: DISCONTINUED | OUTPATIENT
Start: 2024-12-23 | End: 2024-12-28

## 2024-12-23 RX ORDER — CIPROFLOXACIN 500 MG/1
500 TABLET, FILM COATED ORAL ONCE
Status: COMPLETED | OUTPATIENT
Start: 2024-12-23 | End: 2024-12-23

## 2024-12-23 RX ORDER — LORAZEPAM 1 MG/1
3 TABLET ORAL
Status: DISCONTINUED | OUTPATIENT
Start: 2024-12-23 | End: 2024-12-28

## 2024-12-23 RX ORDER — 0.9 % SODIUM CHLORIDE 0.9 %
1000 INTRAVENOUS SOLUTION INTRAVENOUS ONCE
Status: COMPLETED | OUTPATIENT
Start: 2024-12-23 | End: 2024-12-23

## 2024-12-23 RX ORDER — CLOPIDOGREL BISULFATE 75 MG/1
75 TABLET ORAL DAILY
Status: DISCONTINUED | OUTPATIENT
Start: 2024-12-24 | End: 2024-12-31 | Stop reason: HOSPADM

## 2024-12-23 RX ORDER — THIAMINE HYDROCHLORIDE 100 MG/ML
100 INJECTION, SOLUTION INTRAMUSCULAR; INTRAVENOUS DAILY
Status: DISCONTINUED | OUTPATIENT
Start: 2024-12-23 | End: 2024-12-31 | Stop reason: HOSPADM

## 2024-12-23 RX ORDER — ATORVASTATIN CALCIUM 40 MG/1
80 TABLET, FILM COATED ORAL NIGHTLY
Status: DISCONTINUED | OUTPATIENT
Start: 2024-12-24 | End: 2024-12-31 | Stop reason: HOSPADM

## 2024-12-23 RX ORDER — CIPROFLOXACIN 2 MG/ML
400 INJECTION, SOLUTION INTRAVENOUS ONCE
Status: COMPLETED | OUTPATIENT
Start: 2024-12-23 | End: 2024-12-23

## 2024-12-23 RX ORDER — GLUCAGON 1 MG/ML
1 KIT INJECTION PRN
Status: DISCONTINUED | OUTPATIENT
Start: 2024-12-23 | End: 2024-12-31 | Stop reason: HOSPADM

## 2024-12-23 RX ORDER — ASCORBIC ACID 500 MG
500 TABLET ORAL DAILY
Status: DISCONTINUED | OUTPATIENT
Start: 2024-12-24 | End: 2024-12-31 | Stop reason: HOSPADM

## 2024-12-23 RX ORDER — LORAZEPAM 2 MG/ML
3 INJECTION INTRAMUSCULAR
Status: DISCONTINUED | OUTPATIENT
Start: 2024-12-23 | End: 2024-12-23

## 2024-12-23 RX ORDER — ONDANSETRON 4 MG/1
4 TABLET, ORALLY DISINTEGRATING ORAL EVERY 8 HOURS PRN
Status: DISCONTINUED | OUTPATIENT
Start: 2024-12-23 | End: 2024-12-23

## 2024-12-23 RX ADMIN — LORAZEPAM 1 MG: 1 TABLET ORAL at 21:30

## 2024-12-23 RX ADMIN — SODIUM CHLORIDE 1000 ML: 9 INJECTION, SOLUTION INTRAVENOUS at 13:54

## 2024-12-23 RX ADMIN — Medication 10 ML: at 19:47

## 2024-12-23 RX ADMIN — ENOXAPARIN SODIUM 40 MG: 100 INJECTION SUBCUTANEOUS at 19:45

## 2024-12-23 RX ADMIN — INSULIN LISPRO 2 UNITS: 100 INJECTION, SOLUTION INTRAVENOUS; SUBCUTANEOUS at 21:34

## 2024-12-23 RX ADMIN — CIPROFLOXACIN 400 MG: 400 INJECTION, SOLUTION INTRAVENOUS at 18:11

## 2024-12-23 RX ADMIN — IOPAMIDOL 75 ML: 755 INJECTION, SOLUTION INTRAVENOUS at 13:50

## 2024-12-23 RX ADMIN — SODIUM CHLORIDE: 9 INJECTION, SOLUTION INTRAVENOUS at 21:40

## 2024-12-23 RX ADMIN — PANTOPRAZOLE SODIUM 40 MG: 40 TABLET, DELAYED RELEASE ORAL at 21:30

## 2024-12-23 RX ADMIN — Medication 10 ML: at 19:51

## 2024-12-23 RX ADMIN — THIAMINE HYDROCHLORIDE 100 MG: 100 INJECTION, SOLUTION INTRAMUSCULAR; INTRAVENOUS at 19:47

## 2024-12-23 RX ADMIN — CIPROFLOXACIN 500 MG: 500 TABLET, FILM COATED ORAL at 15:52

## 2024-12-23 RX ADMIN — TRAMADOL HYDROCHLORIDE 50 MG: 50 TABLET, COATED ORAL at 21:30

## 2024-12-23 ASSESSMENT — PAIN DESCRIPTION - ORIENTATION: ORIENTATION: LEFT

## 2024-12-23 ASSESSMENT — LIFESTYLE VARIABLES
HOW OFTEN DO YOU HAVE A DRINK CONTAINING ALCOHOL: MONTHLY OR LESS
HOW MANY STANDARD DRINKS CONTAINING ALCOHOL DO YOU HAVE ON A TYPICAL DAY: 1 OR 2

## 2024-12-23 ASSESSMENT — PAIN DESCRIPTION - LOCATION: LOCATION: BACK;KNEE;HIP

## 2024-12-23 ASSESSMENT — PAIN SCALES - GENERAL
PAINLEVEL_OUTOF10: 0
PAINLEVEL_OUTOF10: 6
PAINLEVEL_OUTOF10: 9

## 2024-12-23 ASSESSMENT — PAIN DESCRIPTION - DESCRIPTORS: DESCRIPTORS: DULL

## 2024-12-23 NOTE — ED NOTES
Pt was incontinent of urine. Post void bladder scan complete. Pt retaining 261 mL of urine. Provider made aware.

## 2024-12-24 PROBLEM — N42.83 PROSTATIC CYST: Status: ACTIVE | Noted: 2024-12-24

## 2024-12-24 LAB
ALBUMIN SERPL-MCNC: 2.9 G/DL (ref 3.5–5.2)
ALP SERPL-CCNC: 101 U/L (ref 40–129)
ALT SERPL-CCNC: 13 U/L (ref 0–40)
AMMONIA PLAS-SCNC: <10 UMOL/L (ref 16–60)
ANION GAP SERPL CALCULATED.3IONS-SCNC: 12 MMOL/L (ref 7–16)
ANION GAP SERPL CALCULATED.3IONS-SCNC: 13 MMOL/L (ref 7–16)
AST SERPL-CCNC: 19 U/L (ref 0–39)
BASOPHILS # BLD: 0.06 K/UL (ref 0–0.2)
BASOPHILS NFR BLD: 1 % (ref 0–2)
BILIRUB SERPL-MCNC: 0.7 MG/DL (ref 0–1.2)
BUN SERPL-MCNC: 15 MG/DL (ref 6–23)
BUN SERPL-MCNC: 19 MG/DL (ref 6–23)
CALCIUM SERPL-MCNC: 8 MG/DL (ref 8.6–10.2)
CALCIUM SERPL-MCNC: 8.7 MG/DL (ref 8.6–10.2)
CHLORIDE SERPL-SCNC: 101 MMOL/L (ref 98–107)
CHLORIDE SERPL-SCNC: 97 MMOL/L (ref 98–107)
CO2 SERPL-SCNC: 19 MMOL/L (ref 22–29)
CO2 SERPL-SCNC: 21 MMOL/L (ref 22–29)
CREAT SERPL-MCNC: 0.8 MG/DL (ref 0.7–1.2)
CREAT SERPL-MCNC: 0.8 MG/DL (ref 0.7–1.2)
EOSINOPHIL # BLD: 0.17 K/UL (ref 0.05–0.5)
EOSINOPHILS RELATIVE PERCENT: 2 % (ref 0–6)
ERYTHROCYTE [DISTWIDTH] IN BLOOD BY AUTOMATED COUNT: 14.6 % (ref 11.5–15)
GFR, ESTIMATED: >90 ML/MIN/1.73M2
GFR, ESTIMATED: >90 ML/MIN/1.73M2
GLUCOSE BLD-MCNC: 132 MG/DL (ref 74–99)
GLUCOSE BLD-MCNC: 191 MG/DL (ref 74–99)
GLUCOSE BLD-MCNC: 195 MG/DL (ref 74–99)
GLUCOSE BLD-MCNC: 290 MG/DL (ref 74–99)
GLUCOSE SERPL-MCNC: 142 MG/DL (ref 74–99)
GLUCOSE SERPL-MCNC: 186 MG/DL (ref 74–99)
HCT VFR BLD AUTO: 25.1 % (ref 37–54)
HGB BLD-MCNC: 8.6 G/DL (ref 12.5–16.5)
IMM GRANULOCYTES # BLD AUTO: 0.13 K/UL (ref 0–0.58)
IMM GRANULOCYTES NFR BLD: 1 % (ref 0–5)
LYMPHOCYTES NFR BLD: 0.8 K/UL (ref 1.5–4)
LYMPHOCYTES RELATIVE PERCENT: 7 % (ref 20–42)
MAGNESIUM SERPL-MCNC: 1.9 MG/DL (ref 1.6–2.6)
MCH RBC QN AUTO: 31.2 PG (ref 26–35)
MCHC RBC AUTO-ENTMCNC: 34.3 G/DL (ref 32–34.5)
MCV RBC AUTO: 90.9 FL (ref 80–99.9)
MONOCYTES NFR BLD: 0.9 K/UL (ref 0.1–0.95)
MONOCYTES NFR BLD: 8 % (ref 2–12)
NEUTROPHILS NFR BLD: 82 % (ref 43–80)
NEUTS SEG NFR BLD: 9.46 K/UL (ref 1.8–7.3)
OSMOLALITY UR: 487 MOSM/KG (ref 300–900)
OSMOLALITY UR: 487 MOSM/KG (ref 300–900)
PLATELET # BLD AUTO: 284 K/UL (ref 130–450)
PMV BLD AUTO: 9.2 FL (ref 7–12)
POTASSIUM SERPL-SCNC: 4.3 MMOL/L (ref 3.5–5)
POTASSIUM SERPL-SCNC: 4.5 MMOL/L (ref 3.5–5)
PROT SERPL-MCNC: 6.4 G/DL (ref 6.4–8.3)
RBC # BLD AUTO: 2.76 M/UL (ref 3.8–5.8)
SODIUM SERPL-SCNC: 129 MMOL/L (ref 132–146)
SODIUM SERPL-SCNC: 134 MMOL/L (ref 132–146)
SODIUM UR-SCNC: 69 MMOL/L
TSH SERPL DL<=0.05 MIU/L-ACNC: 0.65 UIU/ML (ref 0.27–4.2)
URATE SERPL-MCNC: 5 MG/DL (ref 3.4–7)
VIT B12 SERPL-MCNC: >2000 PG/ML (ref 211–946)
WBC OTHER # BLD: 11.5 K/UL (ref 4.5–11.5)

## 2024-12-24 PROCEDURE — 80048 BASIC METABOLIC PNL TOTAL CA: CPT

## 2024-12-24 PROCEDURE — 99232 SBSQ HOSP IP/OBS MODERATE 35: CPT | Performed by: FAMILY MEDICINE

## 2024-12-24 PROCEDURE — 2580000003 HC RX 258: Performed by: FAMILY MEDICINE

## 2024-12-24 PROCEDURE — 36415 COLL VENOUS BLD VENIPUNCTURE: CPT

## 2024-12-24 PROCEDURE — 80053 COMPREHEN METABOLIC PANEL: CPT

## 2024-12-24 PROCEDURE — 97530 THERAPEUTIC ACTIVITIES: CPT | Performed by: OCCUPATIONAL THERAPIST

## 2024-12-24 PROCEDURE — 84550 ASSAY OF BLOOD/URIC ACID: CPT

## 2024-12-24 PROCEDURE — 97165 OT EVAL LOW COMPLEX 30 MIN: CPT | Performed by: OCCUPATIONAL THERAPIST

## 2024-12-24 PROCEDURE — 82947 ASSAY GLUCOSE BLOOD QUANT: CPT

## 2024-12-24 PROCEDURE — 83735 ASSAY OF MAGNESIUM: CPT

## 2024-12-24 PROCEDURE — 84300 ASSAY OF URINE SODIUM: CPT

## 2024-12-24 PROCEDURE — 83935 ASSAY OF URINE OSMOLALITY: CPT

## 2024-12-24 PROCEDURE — 6370000000 HC RX 637 (ALT 250 FOR IP): Performed by: FAMILY MEDICINE

## 2024-12-24 PROCEDURE — 85025 COMPLETE CBC W/AUTO DIFF WBC: CPT

## 2024-12-24 PROCEDURE — 99223 1ST HOSP IP/OBS HIGH 75: CPT | Performed by: SURGERY

## 2024-12-24 PROCEDURE — 2060000000 HC ICU INTERMEDIATE R&B

## 2024-12-24 PROCEDURE — 97161 PT EVAL LOW COMPLEX 20 MIN: CPT | Performed by: PHYSICAL THERAPIST

## 2024-12-24 PROCEDURE — 84443 ASSAY THYROID STIM HORMONE: CPT

## 2024-12-24 PROCEDURE — 82140 ASSAY OF AMMONIA: CPT

## 2024-12-24 PROCEDURE — 82607 VITAMIN B-12: CPT

## 2024-12-24 PROCEDURE — 6360000002 HC RX W HCPCS: Performed by: FAMILY MEDICINE

## 2024-12-24 RX ADMIN — TRAMADOL HYDROCHLORIDE 50 MG: 50 TABLET, COATED ORAL at 14:36

## 2024-12-24 RX ADMIN — OXYCODONE HYDROCHLORIDE AND ACETAMINOPHEN 500 MG: 500 TABLET ORAL at 09:53

## 2024-12-24 RX ADMIN — FERROUS SULFATE TAB 325 MG (65 MG ELEMENTAL FE) 325 MG: 325 (65 FE) TAB at 09:53

## 2024-12-24 RX ADMIN — THIAMINE HYDROCHLORIDE 100 MG: 100 INJECTION, SOLUTION INTRAMUSCULAR; INTRAVENOUS at 09:53

## 2024-12-24 RX ADMIN — ENOXAPARIN SODIUM 40 MG: 100 INJECTION SUBCUTANEOUS at 09:54

## 2024-12-24 RX ADMIN — TRAMADOL HYDROCHLORIDE 50 MG: 50 TABLET, COATED ORAL at 06:08

## 2024-12-24 RX ADMIN — FOLIC ACID 1 MG: 1 TABLET ORAL at 09:53

## 2024-12-24 RX ADMIN — SODIUM CHLORIDE: 9 INJECTION, SOLUTION INTRAVENOUS at 09:53

## 2024-12-24 RX ADMIN — PANTOPRAZOLE SODIUM 40 MG: 40 TABLET, DELAYED RELEASE ORAL at 09:53

## 2024-12-24 RX ADMIN — GABAPENTIN 300 MG: 300 CAPSULE ORAL at 09:53

## 2024-12-24 RX ADMIN — INSULIN LISPRO 2 UNITS: 100 INJECTION, SOLUTION INTRAVENOUS; SUBCUTANEOUS at 13:06

## 2024-12-24 RX ADMIN — CLOPIDOGREL BISULFATE 75 MG: 75 TABLET ORAL at 09:53

## 2024-12-24 RX ADMIN — INSULIN LISPRO 1 UNITS: 100 INJECTION, SOLUTION INTRAVENOUS; SUBCUTANEOUS at 18:38

## 2024-12-24 RX ADMIN — INSULIN LISPRO 1 UNITS: 100 INJECTION, SOLUTION INTRAVENOUS; SUBCUTANEOUS at 21:15

## 2024-12-24 RX ADMIN — METFORMIN HYDROCHLORIDE 500 MG: 500 TABLET ORAL at 19:01

## 2024-12-24 RX ADMIN — CIPROFLOXACIN 400 MG: 2 INJECTION, SOLUTION INTRAVENOUS at 06:10

## 2024-12-24 RX ADMIN — CARVEDILOL 3.12 MG: 3.12 TABLET, FILM COATED ORAL at 09:53

## 2024-12-24 RX ADMIN — CIPROFLOXACIN 400 MG: 2 INJECTION, SOLUTION INTRAVENOUS at 18:41

## 2024-12-24 RX ADMIN — GABAPENTIN 300 MG: 300 CAPSULE ORAL at 21:09

## 2024-12-24 RX ADMIN — LORAZEPAM 2 MG: 1 TABLET ORAL at 11:42

## 2024-12-24 RX ADMIN — ATORVASTATIN CALCIUM 80 MG: 40 TABLET, FILM COATED ORAL at 21:09

## 2024-12-24 RX ADMIN — LOSARTAN POTASSIUM 100 MG: 50 TABLET, FILM COATED ORAL at 09:59

## 2024-12-24 RX ADMIN — METFORMIN HYDROCHLORIDE 500 MG: 500 TABLET ORAL at 09:53

## 2024-12-24 ASSESSMENT — PAIN DESCRIPTION - DESCRIPTORS
DESCRIPTORS: DULL
DESCRIPTORS: DULL
DESCRIPTORS: POUNDING
DESCRIPTORS: SHARP;BURNING;DULL

## 2024-12-24 ASSESSMENT — PAIN SCALES - GENERAL
PAINLEVEL_OUTOF10: 6
PAINLEVEL_OUTOF10: 7
PAINLEVEL_OUTOF10: 8
PAINLEVEL_OUTOF10: 0
PAINLEVEL_OUTOF10: 9
PAINLEVEL_OUTOF10: 9

## 2024-12-24 ASSESSMENT — PAIN DESCRIPTION - LOCATION
LOCATION: BACK;HIP;KNEE
LOCATION: BACK
LOCATION: BACK;KNEE
LOCATION: BACK;KNEE;HIP

## 2024-12-24 ASSESSMENT — PAIN DESCRIPTION - ORIENTATION
ORIENTATION: LEFT
ORIENTATION: LEFT
ORIENTATION: LOWER;LEFT
ORIENTATION: LOWER

## 2024-12-24 NOTE — CONSULTS
Patient seen and examined.    Consult dictated.                Dr. Mejía, Camille CRUZ, DO  , Thank You for allowing me to participate in the care of this patient.  Will follow the patient with you.    Kristofer Carmona MD  Nephrology    Electronically signed by Kristofer Carmona MD on 12/24/2024 at 1:44 PM

## 2024-12-24 NOTE — CONSULTS
[place] [id number]                              CONSULTATION      PATIENT NAME: LUCIO ORELLANA             : 1960  MED REC NO: [id number]                        ROOM: 0517  ACCOUNT NO: [id number]                       ADMIT DATE: 2024  PROVIDER: Kristofer Carmona MD      CONSULT DATE: 2024    REFERRING PHYSICIAN:  Camille Mejía DO    REASON FOR CONSULTATION:  Hyponatremia.    HISTORY OF PRESENT ILLNESS:  The patient is being seen in consultation at the request of Dr. Mejía.  The patient is a 64-year-old gentleman who presented to the emergency room at [facility] with chief complaints of scrotal edema and difficulty in urinating.  Upon presentation, the patient was found to have a serum sodium of 125 mmol/L, which has increased to 129 mmol/L this morning.  He does have a longstanding history of chronic hyponatremia.  In fact, he has been seen by me during his hospitalization in  and .  The patient had a serum sodium of 125 mmol/L upon presentation and is up to 129 mmol/L.  When seen up on the floor, his main complaint is difficulty in voiding and scrotal edema.  The patient has had diarrhea for the last 3 weeks.  In the ER, CT scan of the abdomen and pelvis showed prostate abscess with urinary cystitis and liver irregularities and right inguinal hernia.  The patient was given 1 L bolus in the emergency room.    PAST MEDICAL HISTORY:  Significant for history of type 2 diabetes mellitus, hypertension, history of cerebrovascular accident with cerebral infarction, history of bilateral carpal tunnel syndrome, type 2 diabetes mellitus complicated with diabetic foot ulcer and osteomyelitis, cerebrovascular accident with left-sided residual deficits, hyperlipidemia, chronic ulcerations of the left heel, history of osteomyelitis, vitamin D deficiency, chronic hyponatremia.    PAST

## 2024-12-24 NOTE — H&P
[facility] Hospitalist Group   History and Physical      CHIEF COMPLAINT:  scrotal edema, difficulty urinating    History of Present Illness:  64 y.o. male with a history of type 2 DM, CVA with left hemiparesis, HTN, HLD, ETOH, chronic back pain presents with scrotal edema for the past 3 months, 3 weeks of diarrhea, as well as 1 week difficulty urinating.  Workup in ED significant for Na 125, CO2 20, glucose 146, WBC 16.5, hgb 10.2.  UA nitrite and leukocyte esterase positive.  CT abd/pelvis showed prostate abscess, urinary cystitis, liver irregularities concerning for early hepatocellular process, right inguinal hernia with nondilated small bowel loops, left 10-11th rib fractures, left L1 and L3 lateral transverse process fractures.  Given 1L bolus and cipro in ED.    Informant(s) for H&P: patient, chart    REVIEW OF SYSTEMS:  no fevers, chills, cp, sob, n/v, ha, vision/hearing changes, wt changes, hot/cold flashes, other open skin lesions, constipation, dysuria/hematuria unless noted in HPI. Complete ROS performed with the patient and is otherwise negative.      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

## 2024-12-24 NOTE — CARE COORDINATION
Case Management Assessment  Initial Evaluation    Date/Time of Evaluation: 12/24/2024 4:16 PM  Assessment Completed by: Tanesha Wang RN    If patient is discharged prior to next notation, then this note serves as note for discharge by case management.    Patient Name: Jerome Steel                   YOB: 1960  Diagnosis: Hyponatremia [E87.1]  Prostate abscess [N41.2]  Prostatic cyst [N42.83]  Closed fracture of transverse process of lumbar vertebra, initial encounter ([facility]) [S32.009A]  Closed fracture of multiple ribs of left side, initial encounter [S22.42XA]  Inguinal hernia without obstruction or gangrene, recurrence not specified, unspecified laterality [K40.90]                   Date / Time: 12/23/2024 11:48 AM    Patient Admission Status: Inpatient   Readmission Risk (Low < 19, Mod (19-27), High > 27): Readmission Risk Score: 17.5    Current PCP: Babak Kraus, DO  PCP verified by CM? Yes (babak Kraus)    Chart Reviewed: Yes      History Provided by: Patient  Patient Orientation: Alert and Oriented    Patient Cognition: Alert    Hospitalization in the last 30 days (Readmission):  No    If yes, Readmission Assessment in  Navigator will be completed.    Advance Directives:      Code Status: Full Code   Patient's Primary Decision Maker is: Legal Next of Kin    Primary Decision Maker: Yimi Steel - Brother/Sister - [phone]    Discharge Planning:    Patient lives with: Alone Type of Home: House  Primary Care Giver: Self  Patient Support Systems include: Family Members, Friends/Neighbors   Current Financial resources:    Current community resources:    Current services prior to admission: None            Current DME:              Type of Home Care services:  Aide Services, Nursing Services, PT, OT    ADLS  Prior functional level: Independent in ADLs/IADLs  Current functional level: Assistance with the following:, Mobility    PT AM-PAC: 11 /24  OT AM-PAC: 13 /24    Family can

## 2024-12-24 NOTE — CONSULTS
General Surgery Consult    Patient's Name/Date of Birth: Jerome Steel / 1960    Date: December 24, 2024     Consulting Surgeon: Trevor Cuevas M.D.    PCP: Genet Kraus DO     Chief Complaint: low abd pain    HPI:   Jerome Steel is a 64 y.o. male who presents for  evaluation of low abd pain and came to ED for this and workup showed UTI, large chronic inguinal hernia and possible rib fractures but he had no chest pain and admits to falling recently but about a month ago. Timing is constant and worsing with urination, radiation to low abd, alleviated by nothing and started days ago and has associated diarrhea, severity 2-8/10      Past Medical History:   Diagnosis Date    Arthritis     Bilateral carpal tunnel syndrome 2016    Cerebral artery occlusion with cerebral infarction (HCC)     Chronic back pain     Coronary artery disease involving native coronary artery of native heart without angina pectoris 09/16/2022    CVA (cerebral vascular accident) ([facility]) 11/2015, 2017    Diabetes mellitus ([facility])     Type 2    Diabetic foot ulcer with osteomyelitis ([facility]) 12/21/2021    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 2 diabetes mellitus, with necrosis of bone (HCC) 12/21/2021    Hemiparesis affecting left side as late effect of cerebrovascular accident (HCC)     LEFT SIDE NON DOMINANT FOLLOWING STROKE    Hyperlipidemia     Hypertension     Moderate aortic regurgitation     Peripheral vascular angioplasty status 12/21/2021    PFO (patent foramen ovale)     Ulcer of left heel, with necrosis of bone (HCC) 12/27/2021    Ulcer of left heel, with necrosis of muscle (HCC) 12/21/2021    Vitamin D deficiency        Past Surgical History:   Procedure Laterality Date    CARPAL TUNNEL RELEASE  10/01/2016    Dr. Chang    FINGER AMPUTATION      FOOT DEBRIDEMENT Left 2/16/2021    LEFT FOOT DEBRIDEMENT WITH BONE BIOPSY, WOUND VAC APPLICATION performed by Rober Harrell DPM at [facility] OR    FOOT DEBRIDEMENT Left 9/14/2022

## 2024-12-24 NOTE — ACP (ADVANCE CARE PLANNING)
Advance Care Planning   Healthcare Decision Maker:    Primary Decision Maker: Yimi Steel - Brother/Sister - [phone]    Secondary Decision Maker: miguelina julian - Other - [phone]

## 2024-12-25 LAB
ALBUMIN SERPL-MCNC: 2.5 G/DL (ref 3.5–5.2)
ALP SERPL-CCNC: 107 U/L (ref 40–129)
ALT SERPL-CCNC: 13 U/L (ref 0–40)
ANION GAP SERPL CALCULATED.3IONS-SCNC: 11 MMOL/L (ref 7–16)
ANION GAP SERPL CALCULATED.3IONS-SCNC: 13 MMOL/L (ref 7–16)
AST SERPL-CCNC: 19 U/L (ref 0–39)
BASOPHILS # BLD: 0.05 K/UL (ref 0–0.2)
BASOPHILS NFR BLD: 1 % (ref 0–2)
BILIRUB SERPL-MCNC: 0.6 MG/DL (ref 0–1.2)
BUN SERPL-MCNC: 12 MG/DL (ref 6–23)
BUN SERPL-MCNC: 14 MG/DL (ref 6–23)
CALCIUM SERPL-MCNC: 8.1 MG/DL (ref 8.6–10.2)
CALCIUM SERPL-MCNC: 8.6 MG/DL (ref 8.6–10.2)
CHLORIDE SERPL-SCNC: 102 MMOL/L (ref 98–107)
CHLORIDE SERPL-SCNC: 104 MMOL/L (ref 98–107)
CHLORIDE UR-SCNC: 104 MMOL/L
CO2 SERPL-SCNC: 16 MMOL/L (ref 22–29)
CO2 SERPL-SCNC: 20 MMOL/L (ref 22–29)
CREAT SERPL-MCNC: 0.7 MG/DL (ref 0.7–1.2)
CREAT SERPL-MCNC: 0.8 MG/DL (ref 0.7–1.2)
EOSINOPHIL # BLD: 0.17 K/UL (ref 0.05–0.5)
EOSINOPHILS RELATIVE PERCENT: 2 % (ref 0–6)
ERYTHROCYTE [DISTWIDTH] IN BLOOD BY AUTOMATED COUNT: 14.8 % (ref 11.5–15)
GFR, ESTIMATED: >90 ML/MIN/1.73M2
GFR, ESTIMATED: >90 ML/MIN/1.73M2
GLUCOSE BLD-MCNC: 147 MG/DL (ref 74–99)
GLUCOSE BLD-MCNC: 158 MG/DL (ref 74–99)
GLUCOSE BLD-MCNC: 166 MG/DL (ref 74–99)
GLUCOSE BLD-MCNC: 184 MG/DL (ref 74–99)
GLUCOSE SERPL-MCNC: 140 MG/DL (ref 74–99)
GLUCOSE SERPL-MCNC: 195 MG/DL (ref 74–99)
HCT VFR BLD AUTO: 28.2 % (ref 37–54)
HGB BLD-MCNC: 9.1 G/DL (ref 12.5–16.5)
IMM GRANULOCYTES # BLD AUTO: 0.12 K/UL (ref 0–0.58)
IMM GRANULOCYTES NFR BLD: 1 % (ref 0–5)
LYMPHOCYTES NFR BLD: 0.85 K/UL (ref 1.5–4)
LYMPHOCYTES RELATIVE PERCENT: 8 % (ref 20–42)
MAGNESIUM SERPL-MCNC: 1.8 MG/DL (ref 1.6–2.6)
MCH RBC QN AUTO: 30.5 PG (ref 26–35)
MCHC RBC AUTO-ENTMCNC: 32.3 G/DL (ref 32–34.5)
MCV RBC AUTO: 94.6 FL (ref 80–99.9)
MONOCYTES NFR BLD: 0.81 K/UL (ref 0.1–0.95)
MONOCYTES NFR BLD: 8 % (ref 2–12)
NEUTROPHILS NFR BLD: 80 % (ref 43–80)
NEUTS SEG NFR BLD: 8.13 K/UL (ref 1.8–7.3)
PLATELET # BLD AUTO: 253 K/UL (ref 130–450)
PMV BLD AUTO: 9.5 FL (ref 7–12)
POTASSIUM SERPL-SCNC: 4.2 MMOL/L (ref 3.5–5)
POTASSIUM SERPL-SCNC: 6.2 MMOL/L (ref 3.5–5)
POTASSIUM, UR: 17.3 MMOL/L
PROT SERPL-MCNC: 6.2 G/DL (ref 6.4–8.3)
RBC # BLD AUTO: 2.98 M/UL (ref 3.8–5.8)
SODIUM SERPL-SCNC: 133 MMOL/L (ref 132–146)
SODIUM SERPL-SCNC: 133 MMOL/L (ref 132–146)
SODIUM UR-SCNC: 103 MMOL/L
WBC OTHER # BLD: 10.1 K/UL (ref 4.5–11.5)

## 2024-12-25 PROCEDURE — 80048 BASIC METABOLIC PNL TOTAL CA: CPT

## 2024-12-25 PROCEDURE — 2580000003 HC RX 258: Performed by: FAMILY MEDICINE

## 2024-12-25 PROCEDURE — 2060000000 HC ICU INTERMEDIATE R&B

## 2024-12-25 PROCEDURE — 36415 COLL VENOUS BLD VENIPUNCTURE: CPT

## 2024-12-25 PROCEDURE — 85025 COMPLETE CBC W/AUTO DIFF WBC: CPT

## 2024-12-25 PROCEDURE — 80053 COMPREHEN METABOLIC PANEL: CPT

## 2024-12-25 PROCEDURE — 84133 ASSAY OF URINE POTASSIUM: CPT

## 2024-12-25 PROCEDURE — 6360000002 HC RX W HCPCS: Performed by: FAMILY MEDICINE

## 2024-12-25 PROCEDURE — 84300 ASSAY OF URINE SODIUM: CPT

## 2024-12-25 PROCEDURE — 83735 ASSAY OF MAGNESIUM: CPT

## 2024-12-25 PROCEDURE — 6370000000 HC RX 637 (ALT 250 FOR IP): Performed by: FAMILY MEDICINE

## 2024-12-25 PROCEDURE — 2500000003 HC RX 250 WO HCPCS: Performed by: FAMILY MEDICINE

## 2024-12-25 PROCEDURE — 82436 ASSAY OF URINE CHLORIDE: CPT

## 2024-12-25 PROCEDURE — 99232 SBSQ HOSP IP/OBS MODERATE 35: CPT | Performed by: FAMILY MEDICINE

## 2024-12-25 PROCEDURE — 82947 ASSAY GLUCOSE BLOOD QUANT: CPT

## 2024-12-25 RX ADMIN — CLOPIDOGREL BISULFATE 75 MG: 75 TABLET ORAL at 09:25

## 2024-12-25 RX ADMIN — CIPROFLOXACIN 400 MG: 2 INJECTION, SOLUTION INTRAVENOUS at 08:11

## 2024-12-25 RX ADMIN — GABAPENTIN 300 MG: 300 CAPSULE ORAL at 21:05

## 2024-12-25 RX ADMIN — LORAZEPAM 1 MG: 1 TABLET ORAL at 21:01

## 2024-12-25 RX ADMIN — Medication 10 ML: at 09:27

## 2024-12-25 RX ADMIN — Medication 10 ML: at 21:05

## 2024-12-25 RX ADMIN — LOSARTAN POTASSIUM 100 MG: 50 TABLET, FILM COATED ORAL at 09:31

## 2024-12-25 RX ADMIN — CARVEDILOL 3.12 MG: 3.12 TABLET, FILM COATED ORAL at 09:30

## 2024-12-25 RX ADMIN — PANTOPRAZOLE SODIUM 40 MG: 40 TABLET, DELAYED RELEASE ORAL at 09:25

## 2024-12-25 RX ADMIN — CARVEDILOL 3.12 MG: 3.12 TABLET, FILM COATED ORAL at 17:30

## 2024-12-25 RX ADMIN — FOLIC ACID 1 MG: 1 TABLET ORAL at 09:25

## 2024-12-25 RX ADMIN — THIAMINE HYDROCHLORIDE 100 MG: 100 INJECTION, SOLUTION INTRAMUSCULAR; INTRAVENOUS at 09:26

## 2024-12-25 RX ADMIN — TRAMADOL HYDROCHLORIDE 50 MG: 50 TABLET, COATED ORAL at 05:21

## 2024-12-25 RX ADMIN — ENOXAPARIN SODIUM 40 MG: 100 INJECTION SUBCUTANEOUS at 09:29

## 2024-12-25 RX ADMIN — METFORMIN HYDROCHLORIDE 500 MG: 500 TABLET ORAL at 17:30

## 2024-12-25 RX ADMIN — ATORVASTATIN CALCIUM 80 MG: 40 TABLET, FILM COATED ORAL at 21:05

## 2024-12-25 RX ADMIN — OXYCODONE HYDROCHLORIDE AND ACETAMINOPHEN 500 MG: 500 TABLET ORAL at 09:25

## 2024-12-25 RX ADMIN — TRAMADOL HYDROCHLORIDE 50 MG: 50 TABLET, COATED ORAL at 12:02

## 2024-12-25 RX ADMIN — SODIUM CHLORIDE: 9 INJECTION, SOLUTION INTRAVENOUS at 14:12

## 2024-12-25 RX ADMIN — FERROUS SULFATE TAB 325 MG (65 MG ELEMENTAL FE) 325 MG: 325 (65 FE) TAB at 09:26

## 2024-12-25 RX ADMIN — METFORMIN HYDROCHLORIDE 500 MG: 500 TABLET ORAL at 09:25

## 2024-12-25 RX ADMIN — Medication 10 ML: at 07:57

## 2024-12-25 RX ADMIN — GABAPENTIN 300 MG: 300 CAPSULE ORAL at 09:25

## 2024-12-25 RX ADMIN — CIPROFLOXACIN 400 MG: 2 INJECTION, SOLUTION INTRAVENOUS at 17:33

## 2024-12-25 RX ADMIN — TRAMADOL HYDROCHLORIDE 50 MG: 50 TABLET, COATED ORAL at 21:00

## 2024-12-25 RX ADMIN — INSULIN LISPRO 1 UNITS: 100 INJECTION, SOLUTION INTRAVENOUS; SUBCUTANEOUS at 21:06

## 2024-12-25 ASSESSMENT — PAIN SCALES - GENERAL
PAINLEVEL_OUTOF10: 6
PAINLEVEL_OUTOF10: 3
PAINLEVEL_OUTOF10: 8
PAINLEVEL_OUTOF10: 9
PAINLEVEL_OUTOF10: 8

## 2024-12-25 ASSESSMENT — PAIN DESCRIPTION - ORIENTATION
ORIENTATION: LEFT
ORIENTATION: LEFT

## 2024-12-25 ASSESSMENT — PAIN DESCRIPTION - DESCRIPTORS
DESCRIPTORS: ACHING;DISCOMFORT
DESCRIPTORS: ACHING;DISCOMFORT
DESCRIPTORS: POUNDING
DESCRIPTORS: ACHING
DESCRIPTORS: ACHING;DISCOMFORT

## 2024-12-25 ASSESSMENT — PAIN DESCRIPTION - LOCATION
LOCATION: BACK;KNEE
LOCATION: BACK;KNEE;HIP
LOCATION: BACK;HIP
LOCATION: BACK;KNEE;HIP
LOCATION: BACK;HIP;KNEE

## 2024-12-26 LAB
ALBUMIN SERPL-MCNC: 2.8 G/DL (ref 3.5–5.2)
ALP SERPL-CCNC: 112 U/L (ref 40–129)
ALT SERPL-CCNC: 12 U/L (ref 0–40)
ANION GAP SERPL CALCULATED.3IONS-SCNC: 11 MMOL/L (ref 7–16)
ANION GAP SERPL CALCULATED.3IONS-SCNC: 9 MMOL/L (ref 7–16)
AST SERPL-CCNC: 18 U/L (ref 0–39)
BASOPHILS # BLD: 0.06 K/UL (ref 0–0.2)
BASOPHILS NFR BLD: 1 % (ref 0–2)
BILIRUB SERPL-MCNC: 0.6 MG/DL (ref 0–1.2)
BUN SERPL-MCNC: 10 MG/DL (ref 6–23)
BUN SERPL-MCNC: 10 MG/DL (ref 6–23)
CALCIUM SERPL-MCNC: 8 MG/DL (ref 8.6–10.2)
CALCIUM SERPL-MCNC: 8.5 MG/DL (ref 8.6–10.2)
CHLORIDE SERPL-SCNC: 102 MMOL/L (ref 98–107)
CHLORIDE SERPL-SCNC: 99 MMOL/L (ref 98–107)
CO2 SERPL-SCNC: 19 MMOL/L (ref 22–29)
CO2 SERPL-SCNC: 20 MMOL/L (ref 22–29)
CREAT SERPL-MCNC: 0.7 MG/DL (ref 0.7–1.2)
CREAT SERPL-MCNC: 0.8 MG/DL (ref 0.7–1.2)
EKG ATRIAL RATE: 67 BPM
EKG P AXIS: 19 DEGREES
EKG P-R INTERVAL: 170 MS
EKG Q-T INTERVAL: 408 MS
EKG QRS DURATION: 84 MS
EKG QTC CALCULATION (BAZETT): 431 MS
EKG R AXIS: 10 DEGREES
EKG T AXIS: 17 DEGREES
EKG VENTRICULAR RATE: 67 BPM
EOSINOPHIL # BLD: 0.24 K/UL (ref 0.05–0.5)
EOSINOPHILS RELATIVE PERCENT: 2 % (ref 0–6)
ERYTHROCYTE [DISTWIDTH] IN BLOOD BY AUTOMATED COUNT: 14.4 % (ref 11.5–15)
FERRITIN SERPL-MCNC: 863 NG/ML
GFR, ESTIMATED: >90 ML/MIN/1.73M2
GFR, ESTIMATED: >90 ML/MIN/1.73M2
GLUCOSE BLD-MCNC: 167 MG/DL (ref 74–99)
GLUCOSE BLD-MCNC: 168 MG/DL (ref 74–99)
GLUCOSE BLD-MCNC: 182 MG/DL (ref 74–99)
GLUCOSE BLD-MCNC: 183 MG/DL (ref 74–99)
GLUCOSE SERPL-MCNC: 133 MG/DL (ref 74–99)
GLUCOSE SERPL-MCNC: 178 MG/DL (ref 74–99)
HCT VFR BLD AUTO: 26.6 % (ref 37–54)
HGB BLD-MCNC: 8.9 G/DL (ref 12.5–16.5)
IMM GRANULOCYTES # BLD AUTO: 0.15 K/UL (ref 0–0.58)
IMM GRANULOCYTES NFR BLD: 1 % (ref 0–5)
IRON SATN MFR SERPL: 8 % (ref 20–55)
IRON SERPL-MCNC: 15 UG/DL (ref 59–158)
LYMPHOCYTES NFR BLD: 0.94 K/UL (ref 1.5–4)
LYMPHOCYTES RELATIVE PERCENT: 8 % (ref 20–42)
MAGNESIUM SERPL-MCNC: 1.4 MG/DL (ref 1.6–2.6)
MCH RBC QN AUTO: 30.2 PG (ref 26–35)
MCHC RBC AUTO-ENTMCNC: 33.5 G/DL (ref 32–34.5)
MCV RBC AUTO: 90.2 FL (ref 80–99.9)
MONOCYTES NFR BLD: 0.71 K/UL (ref 0.1–0.95)
MONOCYTES NFR BLD: 6 % (ref 2–12)
NEUTROPHILS NFR BLD: 83 % (ref 43–80)
NEUTS SEG NFR BLD: 10.3 K/UL (ref 1.8–7.3)
PLATELET # BLD AUTO: 303 K/UL (ref 130–450)
PMV BLD AUTO: 9.1 FL (ref 7–12)
POTASSIUM SERPL-SCNC: 4.1 MMOL/L (ref 3.5–5)
POTASSIUM SERPL-SCNC: 4.2 MMOL/L (ref 3.5–5)
PROT SERPL-MCNC: 6.2 G/DL (ref 6.4–8.3)
RBC # BLD AUTO: 2.95 M/UL (ref 3.8–5.8)
SODIUM SERPL-SCNC: 128 MMOL/L (ref 132–146)
SODIUM SERPL-SCNC: 132 MMOL/L (ref 132–146)
TIBC SERPL-MCNC: 179 UG/DL (ref 250–450)
WBC OTHER # BLD: 12.4 K/UL (ref 4.5–11.5)

## 2024-12-26 PROCEDURE — 82728 ASSAY OF FERRITIN: CPT

## 2024-12-26 PROCEDURE — 85025 COMPLETE CBC W/AUTO DIFF WBC: CPT

## 2024-12-26 PROCEDURE — 6360000002 HC RX W HCPCS: Performed by: FAMILY MEDICINE

## 2024-12-26 PROCEDURE — G0103 PSA SCREENING: HCPCS

## 2024-12-26 PROCEDURE — 2580000003 HC RX 258: Performed by: FAMILY MEDICINE

## 2024-12-26 PROCEDURE — 2500000003 HC RX 250 WO HCPCS: Performed by: FAMILY MEDICINE

## 2024-12-26 PROCEDURE — 36415 COLL VENOUS BLD VENIPUNCTURE: CPT

## 2024-12-26 PROCEDURE — 83550 IRON BINDING TEST: CPT

## 2024-12-26 PROCEDURE — 83735 ASSAY OF MAGNESIUM: CPT

## 2024-12-26 PROCEDURE — 6370000000 HC RX 637 (ALT 250 FOR IP): Performed by: FAMILY MEDICINE

## 2024-12-26 PROCEDURE — 6370000000 HC RX 637 (ALT 250 FOR IP): Performed by: UROLOGY

## 2024-12-26 PROCEDURE — 82947 ASSAY GLUCOSE BLOOD QUANT: CPT

## 2024-12-26 PROCEDURE — 80048 BASIC METABOLIC PNL TOTAL CA: CPT

## 2024-12-26 PROCEDURE — 80053 COMPREHEN METABOLIC PANEL: CPT

## 2024-12-26 PROCEDURE — 93010 ELECTROCARDIOGRAM REPORT: CPT | Performed by: INTERNAL MEDICINE

## 2024-12-26 PROCEDURE — 99232 SBSQ HOSP IP/OBS MODERATE 35: CPT | Performed by: FAMILY MEDICINE

## 2024-12-26 PROCEDURE — 83540 ASSAY OF IRON: CPT

## 2024-12-26 PROCEDURE — 2060000000 HC ICU INTERMEDIATE R&B

## 2024-12-26 RX ORDER — MAGNESIUM SULFATE IN WATER 40 MG/ML
2000 INJECTION, SOLUTION INTRAVENOUS ONCE
Status: COMPLETED | OUTPATIENT
Start: 2024-12-26 | End: 2024-12-26

## 2024-12-26 RX ORDER — TAMSULOSIN HYDROCHLORIDE 0.4 MG/1
0.4 CAPSULE ORAL NIGHTLY
Status: DISCONTINUED | OUTPATIENT
Start: 2024-12-26 | End: 2024-12-31 | Stop reason: HOSPADM

## 2024-12-26 RX ADMIN — GABAPENTIN 300 MG: 300 CAPSULE ORAL at 21:05

## 2024-12-26 RX ADMIN — FOLIC ACID 1 MG: 1 TABLET ORAL at 09:30

## 2024-12-26 RX ADMIN — THIAMINE HYDROCHLORIDE 100 MG: 100 INJECTION, SOLUTION INTRAMUSCULAR; INTRAVENOUS at 09:30

## 2024-12-26 RX ADMIN — ATORVASTATIN CALCIUM 80 MG: 40 TABLET, FILM COATED ORAL at 21:05

## 2024-12-26 RX ADMIN — CARVEDILOL 3.12 MG: 3.12 TABLET, FILM COATED ORAL at 18:30

## 2024-12-26 RX ADMIN — OXYCODONE HYDROCHLORIDE AND ACETAMINOPHEN 500 MG: 500 TABLET ORAL at 09:30

## 2024-12-26 RX ADMIN — GABAPENTIN 300 MG: 300 CAPSULE ORAL at 09:30

## 2024-12-26 RX ADMIN — TAMSULOSIN HYDROCHLORIDE 0.4 MG: 0.4 CAPSULE ORAL at 21:06

## 2024-12-26 RX ADMIN — TRAMADOL HYDROCHLORIDE 50 MG: 50 TABLET, COATED ORAL at 04:57

## 2024-12-26 RX ADMIN — PANTOPRAZOLE SODIUM 40 MG: 40 TABLET, DELAYED RELEASE ORAL at 09:30

## 2024-12-26 RX ADMIN — LOSARTAN POTASSIUM 100 MG: 50 TABLET, FILM COATED ORAL at 09:45

## 2024-12-26 RX ADMIN — INSULIN LISPRO 1 UNITS: 100 INJECTION, SOLUTION INTRAVENOUS; SUBCUTANEOUS at 21:07

## 2024-12-26 RX ADMIN — CLOPIDOGREL BISULFATE 75 MG: 75 TABLET ORAL at 09:30

## 2024-12-26 RX ADMIN — CARVEDILOL 3.12 MG: 3.12 TABLET, FILM COATED ORAL at 09:30

## 2024-12-26 RX ADMIN — Medication 10 ML: at 09:30

## 2024-12-26 RX ADMIN — CIPROFLOXACIN 400 MG: 2 INJECTION, SOLUTION INTRAVENOUS at 06:01

## 2024-12-26 RX ADMIN — METFORMIN HYDROCHLORIDE 500 MG: 500 TABLET ORAL at 18:30

## 2024-12-26 RX ADMIN — FERROUS SULFATE TAB 325 MG (65 MG ELEMENTAL FE) 325 MG: 325 (65 FE) TAB at 09:30

## 2024-12-26 RX ADMIN — MAGNESIUM SULFATE HEPTAHYDRATE 2000 MG: 40 INJECTION, SOLUTION INTRAVENOUS at 10:04

## 2024-12-26 RX ADMIN — METFORMIN HYDROCHLORIDE 500 MG: 500 TABLET ORAL at 09:30

## 2024-12-26 RX ADMIN — TRAMADOL HYDROCHLORIDE 50 MG: 50 TABLET, COATED ORAL at 23:13

## 2024-12-26 RX ADMIN — ENOXAPARIN SODIUM 40 MG: 100 INJECTION SUBCUTANEOUS at 09:30

## 2024-12-26 RX ADMIN — Medication 10 ML: at 09:40

## 2024-12-26 RX ADMIN — SODIUM CHLORIDE: 9 INJECTION, SOLUTION INTRAVENOUS at 05:01

## 2024-12-26 RX ADMIN — INSULIN LISPRO 1 UNITS: 100 INJECTION, SOLUTION INTRAVENOUS; SUBCUTANEOUS at 12:34

## 2024-12-26 RX ADMIN — CIPROFLOXACIN 400 MG: 2 INJECTION, SOLUTION INTRAVENOUS at 18:48

## 2024-12-26 RX ADMIN — LORAZEPAM 2 MG: 1 TABLET ORAL at 12:50

## 2024-12-26 RX ADMIN — Medication 10 ML: at 21:20

## 2024-12-26 RX ADMIN — TRAMADOL HYDROCHLORIDE 50 MG: 50 TABLET, COATED ORAL at 15:30

## 2024-12-26 ASSESSMENT — PAIN - FUNCTIONAL ASSESSMENT: PAIN_FUNCTIONAL_ASSESSMENT: PREVENTS OR INTERFERES WITH MANY ACTIVE NOT PASSIVE ACTIVITIES

## 2024-12-26 ASSESSMENT — PAIN DESCRIPTION - FREQUENCY: FREQUENCY: CONTINUOUS

## 2024-12-26 ASSESSMENT — PAIN SCALES - GENERAL
PAINLEVEL_OUTOF10: 9
PAINLEVEL_OUTOF10: 0
PAINLEVEL_OUTOF10: 8
PAINLEVEL_OUTOF10: 8
PAINLEVEL_OUTOF10: 0
PAINLEVEL_OUTOF10: 7

## 2024-12-26 ASSESSMENT — PAIN DESCRIPTION - ORIENTATION: ORIENTATION: LEFT

## 2024-12-26 ASSESSMENT — PAIN DESCRIPTION - PAIN TYPE: TYPE: CHRONIC PAIN

## 2024-12-26 ASSESSMENT — PAIN DESCRIPTION - DESCRIPTORS
DESCRIPTORS: ACHING
DESCRIPTORS: ACHING;DISCOMFORT

## 2024-12-26 ASSESSMENT — PAIN DESCRIPTION - LOCATION
LOCATION: BACK;KNEE;SHOULDER
LOCATION: HIP;BACK
LOCATION: BACK;HIP;KNEE

## 2024-12-26 ASSESSMENT — PAIN DESCRIPTION - ONSET: ONSET: ON-GOING

## 2024-12-26 NOTE — CONSULTS
12/26/2024 12:59 PM  Jerome Steel  [id number]     Chief Complaint:    Prostatitis    History of Present Illness:      The patient is a 64 y.o. male patient who presented to the hospital with complaints of diarrhea and urinary retention.  This been ongoing for the past week.  He states that when trying to void he accidentally defecates onto the floor.    Urology is asked to consult for prostatitis.  He states he has never seen urologist in the past. He denies pain presently.    He denies use of Flomax or finasteride.  He denies fevers, chills, nausea, vomiting, or dysuria.  Cole catheter is currently in place draining clear yellow urine.  He denies prior history of prostate surgery.      Past Medical History:   Diagnosis Date    Arthritis     Bilateral carpal tunnel syndrome 2016    Cerebral artery occlusion with cerebral infarction ([facility])     Chronic back pain     Coronary artery disease involving native coronary artery of native heart without angina pectoris 09/16/2022    CVA (cerebral vascular accident) ([facility]) 11/2015, 2017    Diabetes mellitus ([facility])     Type 2    Diabetic foot ulcer with osteomyelitis ([facility]) 12/21/2021    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 2 diabetes mellitus, with necrosis of bone ([facility]) 12/21/2021    Hemiparesis affecting left side as late effect of cerebrovascular accident ([facility])     LEFT SIDE NON DOMINANT FOLLOWING STROKE    Hyperlipidemia     Hypertension     Moderate aortic regurgitation     Peripheral vascular angioplasty status 12/21/2021    PFO (patent foramen ovale)     Ulcer of left heel, with necrosis of bone (HCC) 12/27/2021    Ulcer of left heel, with necrosis of muscle ([facility]) 12/21/2021    Vitamin D deficiency          Past Surgical History:   Procedure Laterality Date    CARPAL TUNNEL RELEASE  10/01/2016    Dr. Chang    FINGER AMPUTATION      FOOT DEBRIDEMENT Left 2/16/2021    LEFT FOOT DEBRIDEMENT WITH BONE BIOPSY, WOUND VAC APPLICATION performed by Rober Harrell

## 2024-12-26 NOTE — DISCHARGE INSTR - COC
Diagnosis Code    Type 2 diabetes mellitus without complication, without long-term current use of insulin ([facility]) E11.9    Hypertension I10    Hemiparesis affecting left side as late effect of cerebrovascular accident ([facility]) I69.354    Peripheral arterial disease ([facility]) I73.9    Peripheral vascular angioplasty status Z98.62    Alcohol abuse F10.10    Coronary artery disease involving native coronary artery of native heart without angina pectoris I25.10    Hyperlipidemia E78.5    Iron deficiency anemia secondary to inadequate dietary iron intake D50.8    History of MI (myocardial infarction) I25.2    Chronic bilateral low back pain without sciatica M54.50, G89.29    Intracranial hemorrhage ([facility]) I62.9    History of stroke Z86.73    Opioid dependence with current use ([facility]) F11.20    Prostate abscess N41.2    Alcoholism ([facility]) F10.20    Prostatic cyst N42.83       Isolation/Infection:   Isolation            Contact          Patient Infection Status       Infection Onset Added Last Indicated Last Indicated By Review Planned Expiration Resolved Resolved By    CRE (Carbapenem-Resistant Enterobacteriaceae)  07/28/22 07/28/22 Radha Cortes, RN        Acinetobacter baumannii wound 7-25-22, 8-30-22    MDRO (multi-drug resistant organism)  05/16/22 05/16/22 Winifred Carrillo, LYNETTE        Acinetobacter baumannii - left heel ulcer - 5/12/22,6/22/22    MRSA 05/10/21 05/12/21 05/12/22 Culture, Wound        Foot, wound/abscess, 5/10/2021, 6/3/2021  Left heel ulcer 5/12/22                       Nurse Assessment:  Last Vital Signs: /63   Pulse 87   Temp 98.4 °F (36.9 °C) (Axillary)   Resp 20   Ht 1.778 m (5' 10\")   Wt 77.9 kg (171 lb 12.8 oz)   SpO2 94%   BMI 24.65 kg/m²     Last documented pain score (0-10 scale): Pain Level: 7  Last Weight:   Wt Readings from Last 1 Encounters:   12/23/24 77.9 kg (171 lb 12.8 oz)     Mental Status:  oriented, alert, and coherent    IV Access:  - None    Nursing Mobility/ADLs:  Walking
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
## 2024-12-26 NOTE — PLAN OF CARE
Problem: Chronic Conditions and Co-morbidities  Goal: Patient's chronic conditions and co-morbidity symptoms are monitored and maintained or improved  Outcome: Progressing     Problem: Discharge Planning  Goal: Discharge to home or other facility with appropriate resources

## 2024-12-27 LAB
25(OH)D3 SERPL-MCNC: 54.7 NG/ML (ref 30–100)
ALBUMIN SERPL-MCNC: 2.3 G/DL (ref 3.5–5.2)
ALP SERPL-CCNC: 93 U/L (ref 40–129)
ALT SERPL-CCNC: 11 U/L (ref 0–40)
ANION GAP SERPL CALCULATED.3IONS-SCNC: 10 MMOL/L (ref 7–16)
ANION GAP SERPL CALCULATED.3IONS-SCNC: 11 MMOL/L (ref 7–16)
AST SERPL-CCNC: 19 U/L (ref 0–39)
BASOPHILS # BLD: 0.06 K/UL (ref 0–0.2)
BASOPHILS NFR BLD: 1 % (ref 0–2)
BILIRUB SERPL-MCNC: 0.7 MG/DL (ref 0–1.2)
BUN SERPL-MCNC: 11 MG/DL (ref 6–23)
BUN SERPL-MCNC: 13 MG/DL (ref 6–23)
CALCIUM SERPL-MCNC: 8 MG/DL (ref 8.6–10.2)
CALCIUM SERPL-MCNC: 8.6 MG/DL (ref 8.6–10.2)
CHLORIDE SERPL-SCNC: 100 MMOL/L (ref 98–107)
CHLORIDE SERPL-SCNC: 97 MMOL/L (ref 98–107)
CO2 SERPL-SCNC: 17 MMOL/L (ref 22–29)
CO2 SERPL-SCNC: 22 MMOL/L (ref 22–29)
CREAT SERPL-MCNC: 0.7 MG/DL (ref 0.7–1.2)
CREAT SERPL-MCNC: 0.9 MG/DL (ref 0.7–1.2)
EOSINOPHIL # BLD: 0.21 K/UL (ref 0.05–0.5)
EOSINOPHILS RELATIVE PERCENT: 2 % (ref 0–6)
ERYTHROCYTE [DISTWIDTH] IN BLOOD BY AUTOMATED COUNT: 14.5 % (ref 11.5–15)
GFR, ESTIMATED: >90 ML/MIN/1.73M2
GFR, ESTIMATED: >90 ML/MIN/1.73M2
GLUCOSE BLD-MCNC: 139 MG/DL (ref 74–99)
GLUCOSE BLD-MCNC: 155 MG/DL (ref 74–99)
GLUCOSE BLD-MCNC: 175 MG/DL (ref 74–99)
GLUCOSE BLD-MCNC: 188 MG/DL (ref 74–99)
GLUCOSE SERPL-MCNC: 154 MG/DL (ref 74–99)
GLUCOSE SERPL-MCNC: 163 MG/DL (ref 74–99)
HCT VFR BLD AUTO: 27.8 % (ref 37–54)
HGB BLD-MCNC: 9.2 G/DL (ref 12.5–16.5)
IMM GRANULOCYTES # BLD AUTO: 0.08 K/UL (ref 0–0.58)
IMM GRANULOCYTES NFR BLD: 1 % (ref 0–5)
LYMPHOCYTES NFR BLD: 0.71 K/UL (ref 1.5–4)
LYMPHOCYTES RELATIVE PERCENT: 7 % (ref 20–42)
MAGNESIUM SERPL-MCNC: 1.7 MG/DL (ref 1.6–2.6)
MCH RBC QN AUTO: 31.2 PG (ref 26–35)
MCHC RBC AUTO-ENTMCNC: 33.1 G/DL (ref 32–34.5)
MCV RBC AUTO: 94.2 FL (ref 80–99.9)
MONOCYTES NFR BLD: 0.58 K/UL (ref 0.1–0.95)
MONOCYTES NFR BLD: 6 % (ref 2–12)
NEUTROPHILS NFR BLD: 84 % (ref 43–80)
NEUTS SEG NFR BLD: 8.97 K/UL (ref 1.8–7.3)
PLATELET # BLD AUTO: 250 K/UL (ref 130–450)
PMV BLD AUTO: 9.3 FL (ref 7–12)
POTASSIUM SERPL-SCNC: 3.9 MMOL/L (ref 3.5–5)
POTASSIUM SERPL-SCNC: 4 MMOL/L (ref 3.5–5)
PROT SERPL-MCNC: 5.9 G/DL (ref 6.4–8.3)
PSA SERPL-MCNC: 0.41 NG/ML (ref 0–4)
PSA SERPL-MCNC: 0.48 NG/ML (ref 0–4)
RBC # BLD AUTO: 2.95 M/UL (ref 3.8–5.8)
SODIUM SERPL-SCNC: 128 MMOL/L (ref 132–146)
SODIUM SERPL-SCNC: 129 MMOL/L (ref 132–146)
WBC OTHER # BLD: 10.6 K/UL (ref 4.5–11.5)

## 2024-12-27 PROCEDURE — 80053 COMPREHEN METABOLIC PANEL: CPT

## 2024-12-27 PROCEDURE — 6370000000 HC RX 637 (ALT 250 FOR IP): Performed by: UROLOGY

## 2024-12-27 PROCEDURE — 6370000000 HC RX 637 (ALT 250 FOR IP): Performed by: FAMILY MEDICINE

## 2024-12-27 PROCEDURE — 83735 ASSAY OF MAGNESIUM: CPT

## 2024-12-27 PROCEDURE — 2060000000 HC ICU INTERMEDIATE R&B

## 2024-12-27 PROCEDURE — 6360000002 HC RX W HCPCS: Performed by: FAMILY MEDICINE

## 2024-12-27 PROCEDURE — 97110 THERAPEUTIC EXERCISES: CPT

## 2024-12-27 PROCEDURE — 82306 VITAMIN D 25 HYDROXY: CPT

## 2024-12-27 PROCEDURE — 97535 SELF CARE MNGMENT TRAINING: CPT

## 2024-12-27 PROCEDURE — 84153 ASSAY OF PSA TOTAL: CPT

## 2024-12-27 PROCEDURE — 99232 SBSQ HOSP IP/OBS MODERATE 35: CPT | Performed by: FAMILY MEDICINE

## 2024-12-27 PROCEDURE — 6370000000 HC RX 637 (ALT 250 FOR IP): Performed by: INTERNAL MEDICINE

## 2024-12-27 PROCEDURE — 82947 ASSAY GLUCOSE BLOOD QUANT: CPT

## 2024-12-27 PROCEDURE — 85025 COMPLETE CBC W/AUTO DIFF WBC: CPT

## 2024-12-27 PROCEDURE — 82652 VIT D 1 25-DIHYDROXY: CPT

## 2024-12-27 PROCEDURE — 36415 COLL VENOUS BLD VENIPUNCTURE: CPT

## 2024-12-27 PROCEDURE — 2580000003 HC RX 258: Performed by: INTERNAL MEDICINE

## 2024-12-27 PROCEDURE — 97530 THERAPEUTIC ACTIVITIES: CPT

## 2024-12-27 PROCEDURE — 2500000003 HC RX 250 WO HCPCS: Performed by: FAMILY MEDICINE

## 2024-12-27 PROCEDURE — 80048 BASIC METABOLIC PNL TOTAL CA: CPT

## 2024-12-27 RX ORDER — 0.9 % SODIUM CHLORIDE 0.9 %
250 INTRAVENOUS SOLUTION INTRAVENOUS ONCE
Status: COMPLETED | OUTPATIENT
Start: 2024-12-27 | End: 2024-12-27

## 2024-12-27 RX ORDER — SODIUM BICARBONATE 650 MG/1
1300 TABLET ORAL 2 TIMES DAILY
Status: DISCONTINUED | OUTPATIENT
Start: 2024-12-27 | End: 2024-12-31 | Stop reason: HOSPADM

## 2024-12-27 RX ADMIN — TRAMADOL HYDROCHLORIDE 50 MG: 50 TABLET, COATED ORAL at 11:40

## 2024-12-27 RX ADMIN — MAGNESIUM HYDROXIDE 30 ML: 400 SUSPENSION ORAL at 12:38

## 2024-12-27 RX ADMIN — METFORMIN HYDROCHLORIDE 500 MG: 500 TABLET ORAL at 09:26

## 2024-12-27 RX ADMIN — SODIUM BICARBONATE 650 MG TABLET 1300 MG: at 14:03

## 2024-12-27 RX ADMIN — METFORMIN HYDROCHLORIDE 500 MG: 500 TABLET ORAL at 17:20

## 2024-12-27 RX ADMIN — ATORVASTATIN CALCIUM 80 MG: 40 TABLET, FILM COATED ORAL at 21:20

## 2024-12-27 RX ADMIN — CIPROFLOXACIN 400 MG: 2 INJECTION, SOLUTION INTRAVENOUS at 06:13

## 2024-12-27 RX ADMIN — SODIUM BICARBONATE 650 MG TABLET 1300 MG: at 21:25

## 2024-12-27 RX ADMIN — INSULIN LISPRO 1 UNITS: 100 INJECTION, SOLUTION INTRAVENOUS; SUBCUTANEOUS at 21:25

## 2024-12-27 RX ADMIN — TAMSULOSIN HYDROCHLORIDE 0.4 MG: 0.4 CAPSULE ORAL at 21:25

## 2024-12-27 RX ADMIN — TRAMADOL HYDROCHLORIDE 50 MG: 50 TABLET, COATED ORAL at 22:52

## 2024-12-27 RX ADMIN — PANTOPRAZOLE SODIUM 40 MG: 40 TABLET, DELAYED RELEASE ORAL at 09:26

## 2024-12-27 RX ADMIN — ENOXAPARIN SODIUM 40 MG: 100 INJECTION SUBCUTANEOUS at 09:28

## 2024-12-27 RX ADMIN — CLOPIDOGREL BISULFATE 75 MG: 75 TABLET ORAL at 09:26

## 2024-12-27 RX ADMIN — THIAMINE HYDROCHLORIDE 100 MG: 100 INJECTION, SOLUTION INTRAMUSCULAR; INTRAVENOUS at 09:26

## 2024-12-27 RX ADMIN — GABAPENTIN 300 MG: 300 CAPSULE ORAL at 21:20

## 2024-12-27 RX ADMIN — CARVEDILOL 3.12 MG: 3.12 TABLET, FILM COATED ORAL at 09:26

## 2024-12-27 RX ADMIN — GABAPENTIN 300 MG: 300 CAPSULE ORAL at 09:26

## 2024-12-27 RX ADMIN — FERROUS SULFATE TAB 325 MG (65 MG ELEMENTAL FE) 325 MG: 325 (65 FE) TAB at 09:26

## 2024-12-27 RX ADMIN — Medication 10 ML: at 09:28

## 2024-12-27 RX ADMIN — SODIUM CHLORIDE 250 ML: 9 INJECTION, SOLUTION INTRAVENOUS at 06:13

## 2024-12-27 RX ADMIN — CARVEDILOL 3.12 MG: 3.12 TABLET, FILM COATED ORAL at 17:20

## 2024-12-27 RX ADMIN — FOLIC ACID 1 MG: 1 TABLET ORAL at 09:26

## 2024-12-27 RX ADMIN — CIPROFLOXACIN 400 MG: 2 INJECTION, SOLUTION INTRAVENOUS at 17:23

## 2024-12-27 RX ADMIN — Medication 10 ML: at 21:25

## 2024-12-27 ASSESSMENT — PAIN SCALES - GENERAL
PAINLEVEL_OUTOF10: 3
PAINLEVEL_OUTOF10: 9
PAINLEVEL_OUTOF10: 8
PAINLEVEL_OUTOF10: 7

## 2024-12-27 ASSESSMENT — PAIN DESCRIPTION - DESCRIPTORS: DESCRIPTORS: POUNDING;PRESSURE

## 2024-12-27 ASSESSMENT — PAIN DESCRIPTION - LOCATION
LOCATION: BACK
LOCATION: BACK;KNEE;HIP
LOCATION: GENERALIZED

## 2024-12-27 ASSESSMENT — PAIN DESCRIPTION - ORIENTATION: ORIENTATION: LEFT;RIGHT
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
## 2024-12-27 NOTE — PLAN OF CARE
Problem: Skin/Tissue Integrity  Goal: Absence of new skin breakdown  Description: 1.  Monitor for areas of redness and/or skin breakdown  2.  Assess vascular access sites hourly  3.  Every 4-6 hours minimum:  Change oxygen saturation probe site  4.  Every 4-6 hours:  If on nasal continuous positive airway pressure, respiratory therapy assess nares and determine need for appliance change or resting period.  Outcome: Progressing  Note: There will be no new skin breakdown during the entirety of my shift.      Problem: Safety - Adult  Goal: Free from fall injury  Outcome: Progressing  Flowsheets (Taken 12/27/2024 0027)  Free From Fall Injury: Instruct family/caregiver on patient safety     Problem: Chronic Conditions and Co-morbidities  Goal: Patient's chronic conditions and co-morbidity symptoms are monitored and maintained or improved  Outcome: Not Progressing  Flowsheets (Taken 12/26/2024 1000 by Agatha Petty, RN)  Care Plan - Patient's Chronic Conditions and Co-Morbidity Symptoms are Monitored and Maintained or Improved:   Monitor and assess patient's chronic conditions and comorbid symptoms for stability, deterioration, or improvement   Collaborate with multidisciplinary team to address chronic and comorbid conditions and prevent exacerbation or deterioration   Update acute care plan with appropriate goals if chronic or comorbid symptoms are exacerbated and prevent overall improvement and discharge

## 2024-12-28 LAB
ALBUMIN SERPL-MCNC: 2.5 G/DL (ref 3.5–5.2)
ALP SERPL-CCNC: 95 U/L (ref 40–129)
ALT SERPL-CCNC: 14 U/L (ref 0–40)
ANION GAP SERPL CALCULATED.3IONS-SCNC: 11 MMOL/L (ref 7–16)
ANION GAP SERPL CALCULATED.3IONS-SCNC: 9 MMOL/L (ref 7–16)
AST SERPL-CCNC: 24 U/L (ref 0–39)
BASOPHILS # BLD: 0.03 K/UL (ref 0–0.2)
BASOPHILS NFR BLD: 0 % (ref 0–2)
BILIRUB SERPL-MCNC: 0.5 MG/DL (ref 0–1.2)
BUN SERPL-MCNC: 12 MG/DL (ref 6–23)
BUN SERPL-MCNC: 14 MG/DL (ref 6–23)
CALCIUM SERPL-MCNC: 8.1 MG/DL (ref 8.6–10.2)
CALCIUM SERPL-MCNC: 8.5 MG/DL (ref 8.6–10.2)
CHLORIDE SERPL-SCNC: 98 MMOL/L (ref 98–107)
CHLORIDE SERPL-SCNC: 98 MMOL/L (ref 98–107)
CHLORIDE UR-SCNC: 42 MMOL/L
CO2 SERPL-SCNC: 20 MMOL/L (ref 22–29)
CO2 SERPL-SCNC: 21 MMOL/L (ref 22–29)
CREAT SERPL-MCNC: 0.8 MG/DL (ref 0.7–1.2)
CREAT SERPL-MCNC: 0.8 MG/DL (ref 0.7–1.2)
EOSINOPHIL # BLD: 0.12 K/UL (ref 0.05–0.5)
EOSINOPHILS RELATIVE PERCENT: 2 % (ref 0–6)
ERYTHROCYTE [DISTWIDTH] IN BLOOD BY AUTOMATED COUNT: 14.6 % (ref 11.5–15)
GFR, ESTIMATED: >90 ML/MIN/1.73M2
GFR, ESTIMATED: >90 ML/MIN/1.73M2
GLUCOSE BLD-MCNC: 138 MG/DL (ref 74–99)
GLUCOSE BLD-MCNC: 156 MG/DL (ref 74–99)
GLUCOSE BLD-MCNC: 162 MG/DL (ref 74–99)
GLUCOSE BLD-MCNC: 177 MG/DL (ref 74–99)
GLUCOSE SERPL-MCNC: 147 MG/DL (ref 74–99)
GLUCOSE SERPL-MCNC: 149 MG/DL (ref 74–99)
HCT VFR BLD AUTO: 23.3 % (ref 37–54)
HGB BLD-MCNC: 7.9 G/DL (ref 12.5–16.5)
IMM GRANULOCYTES # BLD AUTO: 0.06 K/UL (ref 0–0.58)
IMM GRANULOCYTES NFR BLD: 1 % (ref 0–5)
LYMPHOCYTES NFR BLD: 0.84 K/UL (ref 1.5–4)
LYMPHOCYTES RELATIVE PERCENT: 13 % (ref 20–42)
MAGNESIUM SERPL-MCNC: 1.6 MG/DL (ref 1.6–2.6)
MCH RBC QN AUTO: 29.9 PG (ref 26–35)
MCHC RBC AUTO-ENTMCNC: 33.9 G/DL (ref 32–34.5)
MCV RBC AUTO: 88.3 FL (ref 80–99.9)
MONOCYTES NFR BLD: 0.68 K/UL (ref 0.1–0.95)
MONOCYTES NFR BLD: 10 % (ref 2–12)
NEUTROPHILS NFR BLD: 74 % (ref 43–80)
NEUTS SEG NFR BLD: 5.01 K/UL (ref 1.8–7.3)
OSMOLALITY UR: 532 MOSM/KG (ref 300–900)
PLATELET # BLD AUTO: 245 K/UL (ref 130–450)
PMV BLD AUTO: 9.2 FL (ref 7–12)
POTASSIUM SERPL-SCNC: 3.9 MMOL/L (ref 3.5–5)
POTASSIUM SERPL-SCNC: 4.4 MMOL/L (ref 3.5–5)
POTASSIUM, UR: 33 MMOL/L
PROT SERPL-MCNC: 5.8 G/DL (ref 6.4–8.3)
RBC # BLD AUTO: 2.64 M/UL (ref 3.8–5.8)
SODIUM SERPL-SCNC: 128 MMOL/L (ref 132–146)
SODIUM SERPL-SCNC: 129 MMOL/L (ref 132–146)
SODIUM UR-SCNC: 43 MMOL/L
URATE SERPL-MCNC: 2.8 MG/DL (ref 3.4–7)
WBC OTHER # BLD: 6.7 K/UL (ref 4.5–11.5)

## 2024-12-28 PROCEDURE — 82947 ASSAY GLUCOSE BLOOD QUANT: CPT

## 2024-12-28 PROCEDURE — 83935 ASSAY OF URINE OSMOLALITY: CPT

## 2024-12-28 PROCEDURE — 84550 ASSAY OF BLOOD/URIC ACID: CPT

## 2024-12-28 PROCEDURE — 6370000000 HC RX 637 (ALT 250 FOR IP): Performed by: INTERNAL MEDICINE

## 2024-12-28 PROCEDURE — 84300 ASSAY OF URINE SODIUM: CPT

## 2024-12-28 PROCEDURE — 6370000000 HC RX 637 (ALT 250 FOR IP): Performed by: FAMILY MEDICINE

## 2024-12-28 PROCEDURE — 2060000000 HC ICU INTERMEDIATE R&B

## 2024-12-28 PROCEDURE — 80053 COMPREHEN METABOLIC PANEL: CPT

## 2024-12-28 PROCEDURE — 6360000002 HC RX W HCPCS: Performed by: FAMILY MEDICINE

## 2024-12-28 PROCEDURE — 99232 SBSQ HOSP IP/OBS MODERATE 35: CPT | Performed by: FAMILY MEDICINE

## 2024-12-28 PROCEDURE — 82436 ASSAY OF URINE CHLORIDE: CPT

## 2024-12-28 PROCEDURE — 84133 ASSAY OF URINE POTASSIUM: CPT

## 2024-12-28 PROCEDURE — 85025 COMPLETE CBC W/AUTO DIFF WBC: CPT

## 2024-12-28 PROCEDURE — 83735 ASSAY OF MAGNESIUM: CPT

## 2024-12-28 PROCEDURE — 36415 COLL VENOUS BLD VENIPUNCTURE: CPT

## 2024-12-28 PROCEDURE — 6370000000 HC RX 637 (ALT 250 FOR IP): Performed by: UROLOGY

## 2024-12-28 PROCEDURE — 80048 BASIC METABOLIC PNL TOTAL CA: CPT

## 2024-12-28 RX ADMIN — CIPROFLOXACIN 400 MG: 2 INJECTION, SOLUTION INTRAVENOUS at 07:11

## 2024-12-28 RX ADMIN — ENOXAPARIN SODIUM 40 MG: 100 INJECTION SUBCUTANEOUS at 08:38

## 2024-12-28 RX ADMIN — OXYCODONE HYDROCHLORIDE AND ACETAMINOPHEN 500 MG: 500 TABLET ORAL at 08:39

## 2024-12-28 RX ADMIN — ACETAMINOPHEN 650 MG: 325 TABLET ORAL at 08:39

## 2024-12-28 RX ADMIN — CLOPIDOGREL BISULFATE 75 MG: 75 TABLET ORAL at 08:39

## 2024-12-28 RX ADMIN — TRAMADOL HYDROCHLORIDE 50 MG: 50 TABLET, COATED ORAL at 16:50

## 2024-12-28 RX ADMIN — FOLIC ACID 1 MG: 1 TABLET ORAL at 08:39

## 2024-12-28 RX ADMIN — PANTOPRAZOLE SODIUM 40 MG: 40 TABLET, DELAYED RELEASE ORAL at 08:39

## 2024-12-28 RX ADMIN — ATORVASTATIN CALCIUM 80 MG: 40 TABLET, FILM COATED ORAL at 19:52

## 2024-12-28 RX ADMIN — SODIUM BICARBONATE 650 MG TABLET 1300 MG: at 19:51

## 2024-12-28 RX ADMIN — THIAMINE HYDROCHLORIDE 100 MG: 100 INJECTION, SOLUTION INTRAMUSCULAR; INTRAVENOUS at 08:41

## 2024-12-28 RX ADMIN — SODIUM BICARBONATE 650 MG TABLET 1300 MG: at 08:39

## 2024-12-28 RX ADMIN — METFORMIN HYDROCHLORIDE 500 MG: 500 TABLET ORAL at 16:50

## 2024-12-28 RX ADMIN — ACETAMINOPHEN 650 MG: 325 TABLET ORAL at 19:50

## 2024-12-28 RX ADMIN — GABAPENTIN 300 MG: 300 CAPSULE ORAL at 08:39

## 2024-12-28 RX ADMIN — CARVEDILOL 3.12 MG: 3.12 TABLET, FILM COATED ORAL at 08:39

## 2024-12-28 RX ADMIN — METFORMIN HYDROCHLORIDE 500 MG: 500 TABLET ORAL at 08:39

## 2024-12-28 RX ADMIN — CARVEDILOL 3.12 MG: 3.12 TABLET, FILM COATED ORAL at 16:50

## 2024-12-28 RX ADMIN — GABAPENTIN 300 MG: 300 CAPSULE ORAL at 19:52

## 2024-12-28 RX ADMIN — FERROUS SULFATE TAB 325 MG (65 MG ELEMENTAL FE) 325 MG: 325 (65 FE) TAB at 08:39

## 2024-12-28 RX ADMIN — CIPROFLOXACIN 400 MG: 2 INJECTION, SOLUTION INTRAVENOUS at 16:49

## 2024-12-28 RX ADMIN — TAMSULOSIN HYDROCHLORIDE 0.4 MG: 0.4 CAPSULE ORAL at 19:51

## 2024-12-28 ASSESSMENT — PAIN SCALES - GENERAL
PAINLEVEL_OUTOF10: 3
PAINLEVEL_OUTOF10: 7
PAINLEVEL_OUTOF10: 3
PAINLEVEL_OUTOF10: 9
PAINLEVEL_OUTOF10: 3

## 2024-12-28 ASSESSMENT — PAIN DESCRIPTION - LOCATION
LOCATION: BACK
LOCATION: BACK

## 2024-12-28 NOTE — CARE COORDINATION
Ss note:12/28/20242:13 PM ADDENDUM: pt with hx of cva left sided hemiparesis, on room air. Pt accepted at [facility]. Per nursing pt will not discharge over the weekend, urology is on, for a procedure on Monday. ABHISHEK placed follow up call to liaison My whom verified that PRECERT will be valid through 1-3-2024.Jose completed. Will need signed KELLY. ANGELO Mayer    Ss note:12/28/2024 11:23 AM pt with hx of cva left sided hemiparesis, room air. Pt accepted at [facility], PRECERT has been obtained and valid today and tomorrow.  notified, Hens completed. Will need signed KELLY, liaison will need notified when discharge is written. ANGELO Mayer

## 2024-12-29 LAB
ALBUMIN SERPL-MCNC: 2.7 G/DL (ref 3.5–5.2)
ALP SERPL-CCNC: 130 U/L (ref 40–129)
ALT SERPL-CCNC: 20 U/L (ref 0–40)
ANION GAP SERPL CALCULATED.3IONS-SCNC: 11 MMOL/L (ref 7–16)
ANION GAP SERPL CALCULATED.3IONS-SCNC: 9 MMOL/L (ref 7–16)
AST SERPL-CCNC: 34 U/L (ref 0–39)
BASOPHILS # BLD: 0.03 K/UL (ref 0–0.2)
BASOPHILS NFR BLD: 0 % (ref 0–2)
BILIRUB SERPL-MCNC: 0.5 MG/DL (ref 0–1.2)
BUN SERPL-MCNC: 12 MG/DL (ref 6–23)
BUN SERPL-MCNC: 13 MG/DL (ref 6–23)
CALCIUM SERPL-MCNC: 8.2 MG/DL (ref 8.6–10.2)
CALCIUM SERPL-MCNC: 8.6 MG/DL (ref 8.6–10.2)
CHLORIDE SERPL-SCNC: 101 MMOL/L (ref 98–107)
CHLORIDE SERPL-SCNC: 98 MMOL/L (ref 98–107)
CO2 SERPL-SCNC: 21 MMOL/L (ref 22–29)
CO2 SERPL-SCNC: 22 MMOL/L (ref 22–29)
CREAT SERPL-MCNC: 0.7 MG/DL (ref 0.7–1.2)
CREAT SERPL-MCNC: 0.8 MG/DL (ref 0.7–1.2)
EOSINOPHIL # BLD: 0.1 K/UL (ref 0.05–0.5)
EOSINOPHILS RELATIVE PERCENT: 1 % (ref 0–6)
ERYTHROCYTE [DISTWIDTH] IN BLOOD BY AUTOMATED COUNT: 14.5 % (ref 11.5–15)
GFR, ESTIMATED: >90 ML/MIN/1.73M2
GFR, ESTIMATED: >90 ML/MIN/1.73M2
GLUCOSE BLD-MCNC: 132 MG/DL (ref 74–99)
GLUCOSE BLD-MCNC: 134 MG/DL (ref 74–99)
GLUCOSE BLD-MCNC: 135 MG/DL (ref 74–99)
GLUCOSE BLD-MCNC: 153 MG/DL (ref 74–99)
GLUCOSE SERPL-MCNC: 135 MG/DL (ref 74–99)
GLUCOSE SERPL-MCNC: 160 MG/DL (ref 74–99)
HCT VFR BLD AUTO: 23.6 % (ref 37–54)
HGB BLD-MCNC: 8 G/DL (ref 12.5–16.5)
IMM GRANULOCYTES # BLD AUTO: 0.04 K/UL (ref 0–0.58)
IMM GRANULOCYTES NFR BLD: 1 % (ref 0–5)
LYMPHOCYTES NFR BLD: 0.82 K/UL (ref 1.5–4)
LYMPHOCYTES RELATIVE PERCENT: 10 % (ref 20–42)
MAGNESIUM SERPL-MCNC: 1.5 MG/DL (ref 1.6–2.6)
MCH RBC QN AUTO: 29.9 PG (ref 26–35)
MCHC RBC AUTO-ENTMCNC: 33.9 G/DL (ref 32–34.5)
MCV RBC AUTO: 88.1 FL (ref 80–99.9)
MONOCYTES NFR BLD: 0.67 K/UL (ref 0.1–0.95)
MONOCYTES NFR BLD: 8 % (ref 2–12)
NEUTROPHILS NFR BLD: 80 % (ref 43–80)
NEUTS SEG NFR BLD: 6.62 K/UL (ref 1.8–7.3)
PLATELET # BLD AUTO: 253 K/UL (ref 130–450)
PMV BLD AUTO: 9.3 FL (ref 7–12)
POTASSIUM SERPL-SCNC: 3.9 MMOL/L (ref 3.5–5)
POTASSIUM SERPL-SCNC: 4.2 MMOL/L (ref 3.5–5)
PROT SERPL-MCNC: 6.2 G/DL (ref 6.4–8.3)
RBC # BLD AUTO: 2.68 M/UL (ref 3.8–5.8)
SODIUM SERPL-SCNC: 130 MMOL/L (ref 132–146)
SODIUM SERPL-SCNC: 132 MMOL/L (ref 132–146)
WBC OTHER # BLD: 8.3 K/UL (ref 4.5–11.5)

## 2024-12-29 PROCEDURE — 6370000000 HC RX 637 (ALT 250 FOR IP): Performed by: FAMILY MEDICINE

## 2024-12-29 PROCEDURE — 6360000002 HC RX W HCPCS: Performed by: FAMILY MEDICINE

## 2024-12-29 PROCEDURE — 2060000000 HC ICU INTERMEDIATE R&B

## 2024-12-29 PROCEDURE — 36415 COLL VENOUS BLD VENIPUNCTURE: CPT

## 2024-12-29 PROCEDURE — 80048 BASIC METABOLIC PNL TOTAL CA: CPT

## 2024-12-29 PROCEDURE — 99232 SBSQ HOSP IP/OBS MODERATE 35: CPT | Performed by: FAMILY MEDICINE

## 2024-12-29 PROCEDURE — 85025 COMPLETE CBC W/AUTO DIFF WBC: CPT

## 2024-12-29 PROCEDURE — 83735 ASSAY OF MAGNESIUM: CPT

## 2024-12-29 PROCEDURE — 82947 ASSAY GLUCOSE BLOOD QUANT: CPT

## 2024-12-29 PROCEDURE — 6370000000 HC RX 637 (ALT 250 FOR IP): Performed by: UROLOGY

## 2024-12-29 PROCEDURE — 80053 COMPREHEN METABOLIC PANEL: CPT

## 2024-12-29 PROCEDURE — 2500000003 HC RX 250 WO HCPCS: Performed by: FAMILY MEDICINE

## 2024-12-29 PROCEDURE — 6370000000 HC RX 637 (ALT 250 FOR IP): Performed by: INTERNAL MEDICINE

## 2024-12-29 RX ADMIN — TRAMADOL HYDROCHLORIDE 50 MG: 50 TABLET, COATED ORAL at 01:07

## 2024-12-29 RX ADMIN — TAMSULOSIN HYDROCHLORIDE 0.4 MG: 0.4 CAPSULE ORAL at 20:07

## 2024-12-29 RX ADMIN — TRAMADOL HYDROCHLORIDE 50 MG: 50 TABLET, COATED ORAL at 17:33

## 2024-12-29 RX ADMIN — SODIUM BICARBONATE 650 MG TABLET 1300 MG: at 20:07

## 2024-12-29 RX ADMIN — TRAMADOL HYDROCHLORIDE 50 MG: 50 TABLET, COATED ORAL at 09:22

## 2024-12-29 RX ADMIN — FOLIC ACID 1 MG: 1 TABLET ORAL at 08:32

## 2024-12-29 RX ADMIN — SODIUM BICARBONATE 650 MG TABLET 1300 MG: at 08:32

## 2024-12-29 RX ADMIN — ATORVASTATIN CALCIUM 80 MG: 40 TABLET, FILM COATED ORAL at 20:07

## 2024-12-29 RX ADMIN — Medication 5 ML: at 21:58

## 2024-12-29 RX ADMIN — FERROUS SULFATE TAB 325 MG (65 MG ELEMENTAL FE) 325 MG: 325 (65 FE) TAB at 08:32

## 2024-12-29 RX ADMIN — CLOPIDOGREL BISULFATE 75 MG: 75 TABLET ORAL at 08:32

## 2024-12-29 RX ADMIN — OXYCODONE HYDROCHLORIDE AND ACETAMINOPHEN 500 MG: 500 TABLET ORAL at 08:32

## 2024-12-29 RX ADMIN — PANTOPRAZOLE SODIUM 40 MG: 40 TABLET, DELAYED RELEASE ORAL at 08:32

## 2024-12-29 RX ADMIN — THIAMINE HYDROCHLORIDE 100 MG: 100 INJECTION, SOLUTION INTRAMUSCULAR; INTRAVENOUS at 08:33

## 2024-12-29 RX ADMIN — GABAPENTIN 300 MG: 300 CAPSULE ORAL at 20:07

## 2024-12-29 RX ADMIN — Medication 5 ML: at 01:04

## 2024-12-29 RX ADMIN — CIPROFLOXACIN 400 MG: 2 INJECTION, SOLUTION INTRAVENOUS at 17:30

## 2024-12-29 RX ADMIN — GABAPENTIN 300 MG: 300 CAPSULE ORAL at 08:32

## 2024-12-29 RX ADMIN — CARVEDILOL 3.12 MG: 3.12 TABLET, FILM COATED ORAL at 08:32

## 2024-12-29 RX ADMIN — CARVEDILOL 3.12 MG: 3.12 TABLET, FILM COATED ORAL at 17:33

## 2024-12-29 RX ADMIN — CIPROFLOXACIN 400 MG: 2 INJECTION, SOLUTION INTRAVENOUS at 06:59

## 2024-12-29 RX ADMIN — ENOXAPARIN SODIUM 40 MG: 100 INJECTION SUBCUTANEOUS at 08:32

## 2024-12-29 RX ADMIN — LOSARTAN POTASSIUM 100 MG: 50 TABLET, FILM COATED ORAL at 08:32

## 2024-12-29 ASSESSMENT — PAIN SCALES - GENERAL
PAINLEVEL_OUTOF10: 7
PAINLEVEL_OUTOF10: 3

## 2024-12-29 ASSESSMENT — PAIN DESCRIPTION - LOCATION
LOCATION: BACK
LOCATION: BACK

## 2024-12-30 LAB
1,25(OH)2D3 SERPL-MCNC: 62.4 PG/ML (ref 19.9–79.3)
ALBUMIN SERPL-MCNC: 2.6 G/DL (ref 3.5–5.2)
ALP SERPL-CCNC: 106 U/L (ref 40–129)
ALT SERPL-CCNC: 19 U/L (ref 0–40)
ANION GAP SERPL CALCULATED.3IONS-SCNC: 10 MMOL/L (ref 7–16)
AST SERPL-CCNC: 36 U/L (ref 0–39)
BASOPHILS # BLD: 0.04 K/UL (ref 0–0.2)
BASOPHILS NFR BLD: 1 % (ref 0–2)
BILIRUB SERPL-MCNC: 0.5 MG/DL (ref 0–1.2)
BUN SERPL-MCNC: 12 MG/DL (ref 6–23)
CALCIUM SERPL-MCNC: 8.6 MG/DL (ref 8.6–10.2)
CHLORIDE SERPL-SCNC: 97 MMOL/L (ref 98–107)
CO2 SERPL-SCNC: 22 MMOL/L (ref 22–29)
CREAT SERPL-MCNC: 0.7 MG/DL (ref 0.7–1.2)
EOSINOPHIL # BLD: 0.19 K/UL (ref 0.05–0.5)
EOSINOPHILS RELATIVE PERCENT: 3 % (ref 0–6)
ERYTHROCYTE [DISTWIDTH] IN BLOOD BY AUTOMATED COUNT: 14.1 % (ref 11.5–15)
GFR, ESTIMATED: >90 ML/MIN/1.73M2
GLUCOSE BLD-MCNC: 121 MG/DL (ref 74–99)
GLUCOSE BLD-MCNC: 142 MG/DL (ref 74–99)
GLUCOSE BLD-MCNC: 150 MG/DL (ref 74–99)
GLUCOSE BLD-MCNC: 189 MG/DL (ref 74–99)
GLUCOSE SERPL-MCNC: 118 MG/DL (ref 74–99)
HCT VFR BLD AUTO: 24.7 % (ref 37–54)
HGB BLD-MCNC: 8.5 G/DL (ref 12.5–16.5)
IMM GRANULOCYTES # BLD AUTO: 0.04 K/UL (ref 0–0.58)
IMM GRANULOCYTES NFR BLD: 1 % (ref 0–5)
LYMPHOCYTES NFR BLD: 0.88 K/UL (ref 1.5–4)
LYMPHOCYTES RELATIVE PERCENT: 12 % (ref 20–42)
MAGNESIUM SERPL-MCNC: 2 MG/DL (ref 1.6–2.6)
MCH RBC QN AUTO: 30.6 PG (ref 26–35)
MCHC RBC AUTO-ENTMCNC: 34.4 G/DL (ref 32–34.5)
MCV RBC AUTO: 88.8 FL (ref 80–99.9)
MONOCYTES NFR BLD: 0.52 K/UL (ref 0.1–0.95)
MONOCYTES NFR BLD: 7 % (ref 2–12)
NEUTROPHILS NFR BLD: 76 % (ref 43–80)
NEUTS SEG NFR BLD: 5.4 K/UL (ref 1.8–7.3)
PLATELET # BLD AUTO: 261 K/UL (ref 130–450)
PMV BLD AUTO: 9.1 FL (ref 7–12)
POTASSIUM SERPL-SCNC: 3.9 MMOL/L (ref 3.5–5)
PROT SERPL-MCNC: 6.4 G/DL (ref 6.4–8.3)
RBC # BLD AUTO: 2.78 M/UL (ref 3.8–5.8)
SODIUM SERPL-SCNC: 129 MMOL/L (ref 132–146)
WBC OTHER # BLD: 7.1 K/UL (ref 4.5–11.5)

## 2024-12-30 PROCEDURE — 85025 COMPLETE CBC W/AUTO DIFF WBC: CPT

## 2024-12-30 PROCEDURE — 6360000002 HC RX W HCPCS: Performed by: FAMILY MEDICINE

## 2024-12-30 PROCEDURE — 6370000000 HC RX 637 (ALT 250 FOR IP): Performed by: FAMILY MEDICINE

## 2024-12-30 PROCEDURE — 2500000003 HC RX 250 WO HCPCS: Performed by: FAMILY MEDICINE

## 2024-12-30 PROCEDURE — 2500000003 HC RX 250 WO HCPCS: Performed by: UROLOGY

## 2024-12-30 PROCEDURE — 80053 COMPREHEN METABOLIC PANEL: CPT

## 2024-12-30 PROCEDURE — 99233 SBSQ HOSP IP/OBS HIGH 50: CPT | Performed by: INTERNAL MEDICINE

## 2024-12-30 PROCEDURE — 6370000000 HC RX 637 (ALT 250 FOR IP): Performed by: INTERNAL MEDICINE

## 2024-12-30 PROCEDURE — 36415 COLL VENOUS BLD VENIPUNCTURE: CPT

## 2024-12-30 PROCEDURE — 82947 ASSAY GLUCOSE BLOOD QUANT: CPT

## 2024-12-30 PROCEDURE — 6370000000 HC RX 637 (ALT 250 FOR IP): Performed by: UROLOGY

## 2024-12-30 PROCEDURE — 83735 ASSAY OF MAGNESIUM: CPT

## 2024-12-30 PROCEDURE — 2060000000 HC ICU INTERMEDIATE R&B

## 2024-12-30 RX ORDER — CIPROFLOXACIN 500 MG/1
500 TABLET, FILM COATED ORAL EVERY 12 HOURS SCHEDULED
Status: DISCONTINUED | OUTPATIENT
Start: 2024-12-30 | End: 2024-12-31 | Stop reason: HOSPADM

## 2024-12-30 RX ORDER — MAGNESIUM SULFATE IN WATER 40 MG/ML
2000 INJECTION, SOLUTION INTRAVENOUS ONCE
Status: COMPLETED | OUTPATIENT
Start: 2024-12-30 | End: 2024-12-30

## 2024-12-30 RX ORDER — SODIUM CHLORIDE 0.9 % (FLUSH) 0.9 %
5-40 SYRINGE (ML) INJECTION PRN
Status: DISCONTINUED | OUTPATIENT
Start: 2024-12-30 | End: 2024-12-31 | Stop reason: HOSPADM

## 2024-12-30 RX ORDER — SODIUM CHLORIDE 1 G/1
1 TABLET ORAL
Status: DISCONTINUED | OUTPATIENT
Start: 2024-12-30 | End: 2024-12-31 | Stop reason: HOSPADM

## 2024-12-30 RX ORDER — SODIUM CHLORIDE 0.9 % (FLUSH) 0.9 %
5-40 SYRINGE (ML) INJECTION EVERY 12 HOURS SCHEDULED
Status: DISCONTINUED | OUTPATIENT
Start: 2024-12-30 | End: 2024-12-31 | Stop reason: HOSPADM

## 2024-12-30 RX ORDER — SODIUM CHLORIDE 9 MG/ML
INJECTION, SOLUTION INTRAVENOUS PRN
Status: DISCONTINUED | OUTPATIENT
Start: 2024-12-30 | End: 2024-12-31 | Stop reason: HOSPADM

## 2024-12-30 RX ADMIN — CARVEDILOL 3.12 MG: 3.12 TABLET, FILM COATED ORAL at 09:16

## 2024-12-30 RX ADMIN — LOSARTAN POTASSIUM 100 MG: 50 TABLET, FILM COATED ORAL at 09:15

## 2024-12-30 RX ADMIN — GABAPENTIN 300 MG: 300 CAPSULE ORAL at 09:15

## 2024-12-30 RX ADMIN — SODIUM BICARBONATE 650 MG TABLET 1300 MG: at 09:15

## 2024-12-30 RX ADMIN — SODIUM CHLORIDE, PRESERVATIVE FREE 10 ML: 5 INJECTION INTRAVENOUS at 09:20

## 2024-12-30 RX ADMIN — TAMSULOSIN HYDROCHLORIDE 0.4 MG: 0.4 CAPSULE ORAL at 21:05

## 2024-12-30 RX ADMIN — SODIUM CHLORIDE 1 G: 1 TABLET ORAL at 17:24

## 2024-12-30 RX ADMIN — CIPROFLOXACIN HYDROCHLORIDE 500 MG: 500 TABLET, FILM COATED ORAL at 21:05

## 2024-12-30 RX ADMIN — OXYCODONE HYDROCHLORIDE AND ACETAMINOPHEN 500 MG: 500 TABLET ORAL at 09:15

## 2024-12-30 RX ADMIN — Medication 10 ML: at 09:21

## 2024-12-30 RX ADMIN — CARVEDILOL 3.12 MG: 3.12 TABLET, FILM COATED ORAL at 17:24

## 2024-12-30 RX ADMIN — TRAMADOL HYDROCHLORIDE 50 MG: 50 TABLET, COATED ORAL at 17:24

## 2024-12-30 RX ADMIN — SODIUM BICARBONATE 650 MG TABLET 1300 MG: at 21:05

## 2024-12-30 RX ADMIN — Medication 10 ML: at 18:02

## 2024-12-30 RX ADMIN — ATORVASTATIN CALCIUM 80 MG: 40 TABLET, FILM COATED ORAL at 21:05

## 2024-12-30 RX ADMIN — SODIUM CHLORIDE 1 G: 1 TABLET ORAL at 15:11

## 2024-12-30 RX ADMIN — GABAPENTIN 300 MG: 300 CAPSULE ORAL at 21:05

## 2024-12-30 RX ADMIN — FOLIC ACID 1 MG: 1 TABLET ORAL at 09:20

## 2024-12-30 RX ADMIN — SODIUM CHLORIDE, PRESERVATIVE FREE 10 ML: 5 INJECTION INTRAVENOUS at 20:35

## 2024-12-30 RX ADMIN — MAGNESIUM SULFATE HEPTAHYDRATE 2000 MG: 40 INJECTION, SOLUTION INTRAVENOUS at 01:47

## 2024-12-30 RX ADMIN — TRAMADOL HYDROCHLORIDE 50 MG: 50 TABLET, COATED ORAL at 09:15

## 2024-12-30 RX ADMIN — PANTOPRAZOLE SODIUM 40 MG: 40 TABLET, DELAYED RELEASE ORAL at 09:15

## 2024-12-30 RX ADMIN — THIAMINE HYDROCHLORIDE 100 MG: 100 INJECTION, SOLUTION INTRAMUSCULAR; INTRAVENOUS at 09:16

## 2024-12-30 ASSESSMENT — PAIN DESCRIPTION - ORIENTATION
ORIENTATION: LOWER;LEFT
ORIENTATION: LEFT
ORIENTATION: LOWER

## 2024-12-30 ASSESSMENT — PAIN - FUNCTIONAL ASSESSMENT
PAIN_FUNCTIONAL_ASSESSMENT: ACTIVITIES ARE NOT PREVENTED

## 2024-12-30 ASSESSMENT — PAIN DESCRIPTION - DESCRIPTORS
DESCRIPTORS: DISCOMFORT;SORE
DESCRIPTORS: DULL;SHARP
DESCRIPTORS: DISCOMFORT;SORE

## 2024-12-30 ASSESSMENT — PAIN SCALES - GENERAL
PAINLEVEL_OUTOF10: 6
PAINLEVEL_OUTOF10: 8
PAINLEVEL_OUTOF10: 8
PAINLEVEL_OUTOF10: 5
PAINLEVEL_OUTOF10: 7

## 2024-12-30 ASSESSMENT — PAIN DESCRIPTION - PAIN TYPE: TYPE: CHRONIC PAIN

## 2024-12-30 ASSESSMENT — PAIN DESCRIPTION - LOCATION
LOCATION: BACK
LOCATION: BACK
LOCATION: ABDOMEN

## 2024-12-30 ASSESSMENT — PAIN DESCRIPTION - ONSET: ONSET: ON-GOING

## 2024-12-30 ASSESSMENT — PAIN DESCRIPTION - FREQUENCY: FREQUENCY: CONTINUOUS

## 2024-12-30 NOTE — CARE COORDINATION
12/30/24 1617 CM note: no covid testing. IV thiamine and po cipro. Hx ETOH abuse- on CIWA scale, CVA w/ L sided- hemiparesis, CAD. Room air. Urology, nephrology, gen surg, and PT/OT following. Cole catheter placed this admit- will need clarification from urology if pt is to dc to SNF with catheter. Plan for [facility]P inpt vs outpt. Discharge plan is [facility]. Per [facility] liaison, they have obtained auth for pt to come to their facility and it is good through 1/3/24. WILL NEED SIGNED KELLY AND DC ORDER. Hens COMPLETED. Pt declines speaking with [facility], and declines outpt rehab/counseling lists. CM will follow. Electronically signed by Tanesha Wang RN on 12/30/2024 at 4:23 PM    Nursing sent prior authorization request for [place] to [address] via [employer] for [employer]  Will await outcome of prior auth      FYI: Dr Nayely Brink

## 2024-12-30 NOTE — DISCHARGE INSTRUCTIONS
Follow up with [facility] (Dr. Rodriguez/Evelyn Christianson Graff) in 2- 4 [phone].  Place the patients taylor to leg bag on discharge from the hospital.  Please give the patient a night bag (large bag) and taylor instructions upon discharge.  Please have the patient contact the office for taylor removal instructions.  The catheter may go red (hematuria), may go clear, and may go red.  This is a normal process, as long as the catheter is draining.  Call the office if any concerns should arise for further instructions, [phone].      Call upon discharge to schedule a follow-up visit with Mayra Rodriguez/Meghan/Evelyn ([facility]) at [phone]      Learning About Indwelling Urinary Catheter Care to Prevent Infection  Overview     A urinary catheter is a flexible plastic tube that's used to drain urine from your bladder when you can't urinate on your own. The catheter allows urine to drain from the bladder into a bag.  Two types of drainage bags may be used with a urinary catheter.  A bedside bag is a large bag that you can hang on the side of your bed or on a chair. You can use it overnight or anytime you will be sitting or lying down for a long time.  A leg bag is a small bag that you can use during the day. It is usually attached to your thigh or calf and hidden under your clothes.  Having a urinary catheter increases your risk of getting a urinary tract infection. Germs may get on the catheter and cause an infection in your bladder or kidneys. The longer you have a catheter, the more likely it is that you will get an infection. You can help prevent this problem with good hygiene and careful handling of your catheter and drainage bags.  How can you help prevent infection?  Take care to stay clean  Always wash your hands well before and after you handle your catheter.  Clean the skin around the catheter daily using soap and water. Dry with a clean towel afterward. You can shower with your catheter and

## 2024-12-31 VITALS
WEIGHT: 171.8 LBS | HEIGHT: 70 IN | DIASTOLIC BLOOD PRESSURE: 50 MMHG | RESPIRATION RATE: 20 BRPM | SYSTOLIC BLOOD PRESSURE: 128 MMHG | BODY MASS INDEX: 24.6 KG/M2 | HEART RATE: 59 BPM | TEMPERATURE: 98.1 F | OXYGEN SATURATION: 94 %

## 2024-12-31 LAB
ANION GAP SERPL CALCULATED.3IONS-SCNC: 11 MMOL/L (ref 7–16)
BUN SERPL-MCNC: 14 MG/DL (ref 6–23)
CALCIUM SERPL-MCNC: 8.5 MG/DL (ref 8.6–10.2)
CHLORIDE SERPL-SCNC: 99 MMOL/L (ref 98–107)
CO2 SERPL-SCNC: 21 MMOL/L (ref 22–29)
CREAT SERPL-MCNC: 0.7 MG/DL (ref 0.7–1.2)
GFR, ESTIMATED: >90 ML/MIN/1.73M2
GLUCOSE BLD-MCNC: 126 MG/DL (ref 74–99)
GLUCOSE BLD-MCNC: 127 MG/DL (ref 74–99)
GLUCOSE SERPL-MCNC: 118 MG/DL (ref 74–99)
POTASSIUM SERPL-SCNC: 4 MMOL/L (ref 3.5–5)
SODIUM SERPL-SCNC: 131 MMOL/L (ref 132–146)

## 2024-12-31 PROCEDURE — 86039 ANTINUCLEAR ANTIBODIES (ANA): CPT

## 2024-12-31 PROCEDURE — 2500000003 HC RX 250 WO HCPCS: Performed by: UROLOGY

## 2024-12-31 PROCEDURE — 80048 BASIC METABOLIC PNL TOTAL CA: CPT

## 2024-12-31 PROCEDURE — 6370000000 HC RX 637 (ALT 250 FOR IP): Performed by: INTERNAL MEDICINE

## 2024-12-31 PROCEDURE — 6370000000 HC RX 637 (ALT 250 FOR IP): Performed by: FAMILY MEDICINE

## 2024-12-31 PROCEDURE — 99239 HOSP IP/OBS DSCHRG MGMT >30: CPT | Performed by: INTERNAL MEDICINE

## 2024-12-31 PROCEDURE — 2500000003 HC RX 250 WO HCPCS: Performed by: FAMILY MEDICINE

## 2024-12-31 PROCEDURE — 36415 COLL VENOUS BLD VENIPUNCTURE: CPT

## 2024-12-31 PROCEDURE — 86038 ANTINUCLEAR ANTIBODIES: CPT

## 2024-12-31 PROCEDURE — 82962 GLUCOSE BLOOD TEST: CPT

## 2024-12-31 PROCEDURE — 6360000002 HC RX W HCPCS: Performed by: FAMILY MEDICINE

## 2024-12-31 RX ORDER — SODIUM BICARBONATE 650 MG/1
1300 TABLET ORAL 2 TIMES DAILY
DISCHARGE
Start: 2024-12-31

## 2024-12-31 RX ORDER — CIPROFLOXACIN 500 MG/1
500 TABLET, FILM COATED ORAL EVERY 12 HOURS SCHEDULED
DISCHARGE
Start: 2024-12-31 | End: 2025-01-06

## 2024-12-31 RX ORDER — TAMSULOSIN HYDROCHLORIDE 0.4 MG/1
0.4 CAPSULE ORAL NIGHTLY
DISCHARGE
Start: 2024-12-31

## 2024-12-31 RX ORDER — TRAMADOL HYDROCHLORIDE 50 MG/1
50 TABLET ORAL EVERY 8 HOURS PRN
Qty: 9 TABLET | Refills: 0 | Status: SHIPPED | OUTPATIENT
Start: 2024-12-31 | End: 2025-01-03

## 2024-12-31 RX ORDER — SODIUM CHLORIDE 1 G/1
1 TABLET ORAL
DISCHARGE
Start: 2024-12-31

## 2024-12-31 RX ADMIN — LOSARTAN POTASSIUM 100 MG: 50 TABLET, FILM COATED ORAL at 09:04

## 2024-12-31 RX ADMIN — CLOPIDOGREL BISULFATE 75 MG: 75 TABLET ORAL at 11:51

## 2024-12-31 RX ADMIN — PANTOPRAZOLE SODIUM 40 MG: 40 TABLET, DELAYED RELEASE ORAL at 09:04

## 2024-12-31 RX ADMIN — FERROUS SULFATE TAB 325 MG (65 MG ELEMENTAL FE) 325 MG: 325 (65 FE) TAB at 09:03

## 2024-12-31 RX ADMIN — TRAMADOL HYDROCHLORIDE 50 MG: 50 TABLET, COATED ORAL at 09:26

## 2024-12-31 RX ADMIN — Medication 10 ML: at 09:08

## 2024-12-31 RX ADMIN — CIPROFLOXACIN HYDROCHLORIDE 500 MG: 500 TABLET, FILM COATED ORAL at 09:03

## 2024-12-31 RX ADMIN — CARVEDILOL 3.12 MG: 3.12 TABLET, FILM COATED ORAL at 09:04

## 2024-12-31 RX ADMIN — FOLIC ACID 1 MG: 1 TABLET ORAL at 09:03

## 2024-12-31 RX ADMIN — Medication 10 ML: at 06:37

## 2024-12-31 RX ADMIN — SODIUM CHLORIDE 1 G: 1 TABLET ORAL at 09:03

## 2024-12-31 RX ADMIN — GABAPENTIN 300 MG: 300 CAPSULE ORAL at 09:04

## 2024-12-31 RX ADMIN — OXYCODONE HYDROCHLORIDE AND ACETAMINOPHEN 500 MG: 500 TABLET ORAL at 09:03

## 2024-12-31 RX ADMIN — Medication 10 ML: at 11:55

## 2024-12-31 RX ADMIN — THIAMINE HYDROCHLORIDE 100 MG: 100 INJECTION, SOLUTION INTRAMUSCULAR; INTRAVENOUS at 09:04

## 2024-12-31 RX ADMIN — SODIUM CHLORIDE, PRESERVATIVE FREE 10 ML: 5 INJECTION INTRAVENOUS at 09:04

## 2024-12-31 RX ADMIN — TRAMADOL HYDROCHLORIDE 50 MG: 50 TABLET, COATED ORAL at 01:26

## 2024-12-31 RX ADMIN — SODIUM CHLORIDE 1 G: 1 TABLET ORAL at 11:54

## 2024-12-31 RX ADMIN — SODIUM BICARBONATE 650 MG TABLET 1300 MG: at 09:03

## 2024-12-31 ASSESSMENT — PAIN SCALES - GENERAL
PAINLEVEL_OUTOF10: 9
PAINLEVEL_OUTOF10: 9
PAINLEVEL_OUTOF10: 6

## 2024-12-31 ASSESSMENT — PAIN DESCRIPTION - LOCATION
LOCATION: GROIN
LOCATION: BACK

## 2024-12-31 ASSESSMENT — PAIN DESCRIPTION - DESCRIPTORS
DESCRIPTORS: ACHING;SORE
DESCRIPTORS: CRAMPING;DULL

## 2024-12-31 ASSESSMENT — PAIN DESCRIPTION - ORIENTATION: ORIENTATION: RIGHT;LEFT;LOWER

## 2024-12-31 NOTE — PLAN OF CARE
Problem: Chronic Conditions and Co-morbidities  Goal: Patient's chronic conditions and co-morbidity symptoms are monitored and maintained or improved  Outcome: Adequate for Discharge  Flowsheets (Taken 12/31/2024 0120 by Marely Warren, RN)  Care Plan - Patient's Chronic Conditions and Co-Morbidity Symptoms are Monitored and Maintained or Improved: Monitor and assess patient's chronic conditions and comorbid symptoms for stability, deterioration, or improvement     Problem: Discharge Planning  Goal: Discharge to home or other facility with appropriate resources  Outcome: Adequate for Discharge  Flowsheets (Taken 12/31/2024 0120 by Marely Warren, RN)  Discharge to home or other facility with appropriate resources: Identify barriers to discharge with patient and caregiver     Problem: Skin/Tissue Integrity  Goal: Absence of new skin breakdown  Description: 1.  Monitor for areas of redness and/or skin breakdown  2.  Assess vascular access sites hourly  3.  Every 4-6 hours minimum:  Change oxygen saturation probe site  4.  Every 4-6 hours:  If on nasal continuous positive airway pressure, respiratory therapy assess nares and determine need for appliance change or resting period.  Outcome: Adequate for Discharge     Problem: Safety - Adult  Goal: Free from fall injury  Outcome: Adequate for Discharge     Problem: ABCDS Injury Assessment  Goal: Absence of physical injury  Outcome: Adequate for Discharge     Problem: Pain  Goal: Verbalizes/displays adequate comfort level or baseline comfort level  Outcome: Adequate for Discharge

## 2024-12-31 NOTE — PROGRESS NOTES
Nephrology Note  Kristofer Carmona MD          Patient seen and examined.  No family member is present at bedside.  Chart reviewed.  Patient had insertion of his Cole catheter yesterday and has had a fair urine output.  He feels his scrotal edema has improved.  Overall he is feeling better.  .  Denies shortness of breath. Appetite good. No nausea or dysguesia.   Awake and alert .   In no acute distress.    Vital SignsBP 127/65   Pulse 75   Temp 98.1 °F (36.7 °C)   Resp 20   Ht 1.778 m (5' 10\")   Wt 77.9 kg (171 lb 12.8 oz)   SpO2 96%   BMI 24.65 kg/m²   24HR INTAKE/OUTPUT:    Intake/Output Summary (Last 24 hours) at 12/25/2024 1356  Last data filed at 12/25/2024 1346  Gross per 24 hour   Intake 10 ml   Output 2425 ml   Net -2415 ml         PHYSICAL EXAM        Neck: No JVD  Lungs: Breath sounds decreased at the bases. No rales or ronchi.  Heart: Regular rate and rhythm. No S3 gallop. No  murmrur.  Abdomen: Soft non distended, non tender and normal bowel sounds.  Extremeties:   Trace bipedal edema    Genitalia: Patient with decreased scrotal edema        Current Facility-Administered Medications   Medication Dose Route Frequency Provider Last Rate Last Admin    sodium chloride flush 0.9 % injection 5-40 mL  5-40 mL IntraVENous 2 times per day Camille Mejía E, DO   10 mL at 12/25/24 0757    sodium chloride flush 0.9 % injection 5-40 mL  5-40 mL IntraVENous PRN Camille Mejía E, DO        0.9 % sodium chloride infusion   IntraVENous PRN Camille Mejía E, DO        0.9 % sodium chloride infusion   IntraVENous Continuous Camille Mejía E,  mL/hr at 12/24/24 0953 New Bag at 12/24/24 0953    thiamine (B-1) injection 100 mg  100 mg IntraVENous Daily Camille Mejía E, DO   100 mg at 12/25/24 0926    folic acid (FOLVITE) tablet 1 mg  1 mg Oral Daily Camille Mejía, DO   1 mg at 12/25/24 0925    LORazepam (ATIVAN) tablet 1 mg  1 mg Oral Q1H PRN Camille Mejía, DO   1 mg at 12/23/24 
       [facility] Hospitalist Progress Note    Admitting Date and Time: 12/23/2024 11:48 AM  Admit Dx: Hyponatremia [E87.1]  Prostate abscess [N41.2]  Prostatic cyst [N42.83]  Closed fracture of transverse process of lumbar vertebra, initial encounter ([facility]) [S32.009A]  Closed fracture of multiple ribs of left side, initial encounter [S22.42XA]  Inguinal hernia without obstruction or gangrene, recurrence not specified, unspecified laterality [K40.90]    Subjective:  Patient is being followed for Hyponatremia [E87.1]  Prostate abscess [N41.2]  Prostatic cyst [N42.83]  Closed fracture of transverse process of lumbar vertebra, initial encounter ([facility]) [S32.009A]  Closed fracture of multiple ribs of left side, initial encounter [S22.42XA]  Inguinal hernia without obstruction or gangrene, recurrence not specified, unspecified laterality [K40.90]     Pt feels OK  No pain  No fevers  No events overnight  No cough wheezing or shortness of breath  Overall feels better compared with admission    Per RN:   Nothing to report    ROS: denies fever, chills, cp, sob, n/v, HA unless stated above.      sodium bicarbonate  1,300 mg Oral BID    tamsulosin  0.4 mg Oral Nightly    sodium chloride flush  5-40 mL IntraVENous 2 times per day    thiamine  100 mg IntraVENous Daily    folic acid  1 mg Oral Daily    sodium chloride flush  5-40 mL IntraVENous 2 times per day    enoxaparin  40 mg SubCUTAneous Daily    ciprofloxacin  400 mg IntraVENous Q12H    atorvastatin  80 mg Oral Nightly    carvedilol  3.125 mg Oral BID with meals    clopidogrel  75 mg Oral Daily    ferrous sulfate  325 mg Oral Daily with breakfast    gabapentin  300 mg Oral BID    losartan  100 mg Oral Daily    metFORMIN  500 mg Oral BID WC    pantoprazole  40 mg Oral Daily    vitamin C  500 mg Oral Daily    insulin lispro  0-4 Units SubCUTAneous 4x Daily AC & HS     sodium chloride flush, 5-40 mL, PRN  sodium chloride, , PRN  LORazepam, 1 mg, Q1H PRN   Or  LORazepam, 2 mg, 
       [facility] Hospitalist   Progress Note    Admitting Date and Time: 12/23/2024 11:48 AM  Admit Dx: Hyponatremia [E87.1]  Prostate abscess [N41.2]  Prostatic cyst [N42.83]  Closed fracture of transverse process of lumbar vertebra, initial encounter ([facility]) [S32.009A]  Closed fracture of multiple ribs of left side, initial encounter [S22.42XA]  Inguinal hernia without obstruction or gangrene, recurrence not specified, unspecified laterality [K40.90]    Subjective/interval history:    Pt admitted with prostate abscess. Cole in place, draining well. Feeling better overall. Tolerating ciprofloxacin.     Urology following noted possible TURP today, however canceled as patient was still on Plavix.    ROS: denies fever, chills, cp, sob, n/v, HA unless stated above.     sodium bicarbonate  1,300 mg Oral BID    tamsulosin  0.4 mg Oral Nightly    sodium chloride flush  5-40 mL IntraVENous 2 times per day    thiamine  100 mg IntraVENous Daily    folic acid  1 mg Oral Daily    sodium chloride flush  5-40 mL IntraVENous 2 times per day    [Held by provider] enoxaparin  40 mg SubCUTAneous Daily    atorvastatin  80 mg Oral Nightly    carvedilol  3.125 mg Oral BID with meals    clopidogrel  75 mg Oral Daily    ferrous sulfate  325 mg Oral Daily with breakfast    gabapentin  300 mg Oral BID    losartan  100 mg Oral Daily    [Held by provider] metFORMIN  500 mg Oral BID WC    pantoprazole  40 mg Oral Daily    vitamin C  500 mg Oral Daily    insulin lispro  0-4 Units SubCUTAneous 4x Daily AC & HS     sodium chloride flush, 5-40 mL, PRN  sodium chloride, , PRN  sodium chloride flush, 5-40 mL, PRN  sodium chloride, , PRN  acetaminophen, 650 mg, Q6H PRN   Or  acetaminophen, 650 mg, Q6H PRN  traMADol, 50 mg, Q8H PRN  glucose, 4 tablet, PRN  dextrose bolus, 125 mL, PRN   Or  dextrose bolus, 250 mL, PRN  glucagon (rDNA), 1 mg, PRN  dextrose, , Continuous PRN         Objective:    /67   Pulse 69   Temp 98.2 °F (36.8 
       [facility] Hospitalist Progress Note    Admitting Date and Time: 12/23/2024 11:48 AM  Admit Dx: Hyponatremia [E87.1]  Prostate abscess [N41.2]  Prostatic cyst [N42.83]  Closed fracture of transverse process of lumbar vertebra, initial encounter ([facility]) [S32.009A]  Closed fracture of multiple ribs of left side, initial encounter [S22.42XA]  Inguinal hernia without obstruction or gangrene, recurrence not specified, unspecified laterality [K40.90]    Subjective:  Patient is being followed for Hyponatremia [E87.1]  Prostate abscess [N41.2]  Prostatic cyst [N42.83]  Closed fracture of transverse process of lumbar vertebra, initial encounter ([facility]) [S32.009A]  Closed fracture of multiple ribs of left side, initial encounter [S22.42XA]  Inguinal hernia without obstruction or gangrene, recurrence not specified, unspecified laterality [K40.90]     Pt states he had pain overnight of prostate.  Didn't sleep well  No events overnight  No fevers  No cough/wheezing/SOB  Tolerating PO    Per RN: nothing to report    ROS: denies fever, chills, cp, sob, n/v, HA unless stated above.      sodium bicarbonate  1,300 mg Oral BID    tamsulosin  0.4 mg Oral Nightly    sodium chloride flush  5-40 mL IntraVENous 2 times per day    thiamine  100 mg IntraVENous Daily    folic acid  1 mg Oral Daily    sodium chloride flush  5-40 mL IntraVENous 2 times per day    enoxaparin  40 mg SubCUTAneous Daily    ciprofloxacin  400 mg IntraVENous Q12H    atorvastatin  80 mg Oral Nightly    carvedilol  3.125 mg Oral BID with meals    clopidogrel  75 mg Oral Daily    ferrous sulfate  325 mg Oral Daily with breakfast    gabapentin  300 mg Oral BID    losartan  100 mg Oral Daily    [Held by provider] metFORMIN  500 mg Oral BID WC    pantoprazole  40 mg Oral Daily    vitamin C  500 mg Oral Daily    insulin lispro  0-4 Units SubCUTAneous 4x Daily AC & HS     sodium chloride flush, 5-40 mL, PRN  sodium chloride, , PRN  sodium chloride flush, 5-40 mL, 
       [facility] Hospitalist Progress Note    Admitting Date and Time: 12/23/2024 11:48 AM  Admit Dx: Hyponatremia [E87.1]  Prostate abscess [N41.2]  Prostatic cyst [N42.83]  Closed fracture of transverse process of lumbar vertebra, initial encounter ([facility]) [S32.009A]  Closed fracture of multiple ribs of left side, initial encounter [S22.42XA]  Inguinal hernia without obstruction or gangrene, recurrence not specified, unspecified laterality [K40.90]      Assessment:    Principal Problem:    Prostate abscess  Active Problems:    Type 2 diabetes mellitus without complication, without long-term current use of insulin ([facility])    Hypertension    Hemiparesis affecting left side as late effect of cerebrovascular accident ([facility])    Hyperlipidemia    History of stroke    Opioid dependence with current use ([facility])    Alcoholism ([facility])  Resolved Problems:    * No resolved hospital problems. *      Plan:  12/23/2024  Prostate abscess  - cipro  - f/u culture results  - urology c/s from ED     Multiple falls  Multiple left rib fractures  Multiple transverse lumbar vertebra fracture  Chronic back pain  - trauma c/s from ED  - symptom management  - fall precautions  - continue home prn tramadol     ETOH  Hyponatremia  - ciwa protocol  - monitor for s/s of withdrawal  - admits to about 6 beers daily, denies liquor use     Type 2 DM  - continue home metformin  - continue home jardiance  - SSI  - hypoglycemia protocol     12/24/2024  --continue cipro  --follow urine culture  --urine gram stain  --await urology consult  --etoh withdrawal protocols  --control pain  --trauma consult pending    Code Status: Full  DVT prophylaxis: Lovenox    Continue at home medications as previously prescribed for the management of chronic conditions during this admission if and where appropriate.    Subjective:  Patient is being followed for Hyponatremia [E87.1]  Prostate abscess [N41.2]  Prostatic cyst [N42.83]  Closed fracture of transverse process of 
       [facility] Hospitalist Progress Note    Admitting Date and Time: 12/23/2024 11:48 AM  Admit Dx: Hyponatremia [E87.1]  Prostate abscess [N41.2]  Prostatic cyst [N42.83]  Closed fracture of transverse process of lumbar vertebra, initial encounter ([facility]) [S32.009A]  Closed fracture of multiple ribs of left side, initial encounter [S22.42XA]  Inguinal hernia without obstruction or gangrene, recurrence not specified, unspecified laterality [K40.90]      Assessment:    Principal Problem:    Prostate abscess  Active Problems:    Type 2 diabetes mellitus without complication, without long-term current use of insulin ([facility])    Hypertension    Hemiparesis affecting left side as late effect of cerebrovascular accident ([facility])    Hyperlipidemia    History of stroke    Opioid dependence with current use ([facility])    Alcoholism ([facility])    Prostatic cyst  Resolved Problems:    * No resolved hospital problems. *      Plan:  12/23/2024  Prostate abscess  - cipro  - f/u culture results  - urology c/s from ED     Multiple falls  Multiple left rib fractures  Multiple transverse lumbar vertebra fracture  Chronic back pain  - trauma c/s from ED  - symptom management  - fall precautions  - continue home prn tramadol     ETOH  Hyponatremia  - ciwa protocol  - monitor for s/s of withdrawal  - admits to about 6 beers daily, denies liquor use     Type 2 DM  - continue home metformin  - continue home jardiance  - SSI  - hypoglycemia protocol     12/24/2024  --continue cipro  --follow urine culture  --urine gram stain  --await urology consult  --etoh withdrawal protocols  --control pain  --trauma consult pending    12/25/2024  --pt decided on inpatient rehabilitation, appreciate case management efforts on this  --hyponatremia resolved  --await urology consult for prostate abscess  --remains on cipro. Awaiting culture  --ciwa for etoh abuse  --control glucose  --awaiting morning labs  --encourage diet  --careful w/ ambulation d/t frequent 
      12/30/2024 7:57 AM  Service: Urology  Group: [facility] (Michael/Evelyn Christianson)    Jerome Steel  [id number]    Chief urologic compliant: prostatic abscess   HPI:  Patient is doing ok  He does not have pain  He remains with the taylor  He was going to have the procedure today but remain on blood thinners and does have anemia      Review of Systems:  Respiratory: negative for cough and hemoptysis  Cardiovascular: negative for chest pain and dyspnea  Gastrointestinal: negative for abdominal pain, diarrhea, nausea and vomiting  Derm: negative for rash and skin lesion(s)  Neurological: negative for seizures and tremors  Endocrine: negative for diabetic symptoms including polydipsia and polyuria  : As above in the HPI, otherwise negative  All other reviews are negative     Allergies: Pcn [penicillins]    Objective:   Vitals:    12/30/24 0744   BP: 129/67   Pulse: 69   Resp: 18   Temp: 98.2 °F (36.8 °C)   SpO2: 90%       Neuro: A/A/O x3  Respiratory: non labored breathing  ABD: soft non-tender, non-distended  : taylor clear  Ext: no clubbing, cyanosis, edema    Labs:   Recent Labs     12/28/24  0752 12/29/24  0740   WBC 6.7 8.3   RBC 2.64* 2.68*   HGB 7.9* 8.0*   HCT 23.3* 23.6*   MCV 88.3 88.1   MCH 29.9 29.9   MCHC 33.9 33.9   RDW 14.6 14.5    253   MPV 9.2 9.3       Recent Labs     12/28/24  1857 12/29/24  0740 12/29/24  1837   CREATININE 0.8 0.7 0.8       Assessment: Jerome Steel 64 y.o. male     Probable  prostate abscess   Prostatitis  UTI  Anemia   Leukocytosis, resolved   Large hernia into the scrotum     Plan:    Cancel surgery  Cont the taylor  Cont the antibiotics  Anemia per the primary   Will plan this procedure in the future once cleared and stable  Could be done as an outpatient   RBA of the procedure was discussed     Kevin Rodriguez,    [facility]  
    12/27/2024 8:41 AM  Jerome Steel  [id number]    Subjective:    Awake and alert  Taylor with madie urine   No family present     Review of Systems  Constitutional: No fever or chills   Respiratory: negative for cough and hemoptysis  Cardiovascular: negative for chest pain and dyspnea  Gastrointestinal: negative for abdominal pain, diarrhea, nausea and vomiting   : See above  Derm: negative for rash and skin lesion(s)  Neurological: negative for seizures and tremors  Musculoskeletal: Negative    Psychiatric: Negative   All other reviews are negative      Scheduled Meds:   tamsulosin  0.4 mg Oral Nightly    sodium chloride flush  5-40 mL IntraVENous 2 times per day    thiamine  100 mg IntraVENous Daily    folic acid  1 mg Oral Daily    sodium chloride flush  5-40 mL IntraVENous 2 times per day    enoxaparin  40 mg SubCUTAneous Daily    ciprofloxacin  400 mg IntraVENous Q12H    atorvastatin  80 mg Oral Nightly    carvedilol  3.125 mg Oral BID with meals    clopidogrel  75 mg Oral Daily    ferrous sulfate  325 mg Oral Daily with breakfast    gabapentin  300 mg Oral BID    losartan  100 mg Oral Daily    metFORMIN  500 mg Oral BID WC    pantoprazole  40 mg Oral Daily    vitamin C  500 mg Oral Daily    insulin lispro  0-4 Units SubCUTAneous 4x Daily AC & HS       Objective:  Vitals:    12/27/24 0557   BP: 105/65   Pulse:    Resp:    Temp:    SpO2:          Allergies: Pcn [penicillins]    General Appearance: alert and oriented to person, place and time and in no acute distress  Skin: no rash or erythema  Head: normocephalic and atraumatic  Pulmonary/Chest: normal air movement, no respiratory distress  Abdomen: soft, non-tender, non-distended  Genitourinary: taylor with madie urine   Extremities: no cyanosis, clubbing or edema         Labs:     Recent Labs     12/27/24  0648   *   K 3.9      CO2 17*   BUN 11   CREATININE 0.7   GLUCOSE 154*   CALCIUM 8.0*       Lab Results   Component Value Date/Time    HGB 
    12/31/2024 10:35 AM  Jerome Steel  [id number]    Subjective:  awake and alert  No pain   Taylor with madie urine     Review of Systems  Constitutional: No fever or chills   Respiratory: negative for cough and hemoptysis  Cardiovascular: negative for chest pain and dyspnea  Gastrointestinal: negative for abdominal pain, diarrhea, nausea and vomiting   : See above  Derm: negative for rash and skin lesion(s)  Neurological: negative for seizures and tremors  Musculoskeletal: Negative    Psychiatric: Negative   All other reviews are negative      Scheduled Meds:   sodium chloride flush  5-40 mL IntraVENous 2 times per day    sodium chloride  1 g Oral TID WC    ciprofloxacin  500 mg Oral 2 times per day    sodium bicarbonate  1,300 mg Oral BID    tamsulosin  0.4 mg Oral Nightly    sodium chloride flush  5-40 mL IntraVENous 2 times per day    thiamine  100 mg IntraVENous Daily    folic acid  1 mg Oral Daily    sodium chloride flush  5-40 mL IntraVENous 2 times per day    [Held by provider] enoxaparin  40 mg SubCUTAneous Daily    atorvastatin  80 mg Oral Nightly    carvedilol  3.125 mg Oral BID with meals    clopidogrel  75 mg Oral Daily    ferrous sulfate  325 mg Oral Daily with breakfast    gabapentin  300 mg Oral BID    losartan  100 mg Oral Daily    [Held by provider] metFORMIN  500 mg Oral BID WC    pantoprazole  40 mg Oral Daily    vitamin C  500 mg Oral Daily    insulin lispro  0-4 Units SubCUTAneous 4x Daily AC & HS       Objective:  Vitals:    12/31/24 0926   BP:    Pulse:    Resp: 18   Temp:    SpO2:          Allergies: Pcn [penicillins]    General Appearance: alert and oriented to person, place and time and in no acute distress  Skin: no rash or erythema  Head: normocephalic and atraumatic  Pulmonary/Chest: normal air movement, no respiratory distress  Abdomen: soft, non-tender, non-distended  Genitourinary: taylor with madie urine   Extremities: no cyanosis, clubbing or edema         Labs:     Recent Labs 
  Physician Progress Note      PATIENT:               LUCIO ORELLANA  CSN #:                  [id number]  :                       1960  ADMIT DATE:       2024 11:48 AM  DISCH DATE:  RESPONDING  PROVIDER #:        Lamont Wells DO          QUERY TEXT:    Patient admitted with prostate abscess. Noted documentation of sepsis, present   on admission per attending . In order to support the diagnosis of   sepsis, please include additional clinical indicators in your documentation.    Or please document if the diagnosis of sepsis has been ruled out after further   study    The medical record reflects the following:  Risk Factors: prostate abscess  Clinical Indicators: On admission WBC 16.5/11.5, T 97.5-98.2 HR 60-78 RR   16-18, BP 96/50 MAP 65, bilirubin 1.0, Scr 0.9/0.8.  Treatment: NS 1250 ml bolus, then at 100 ml/hr, Cipro, Tylenol    Thank you,  Liat Gomes   Clinical Documentation Improvement Specialist  W: [phone]  Options provided:  -- Sepsis present on admission as evidenced by, Please document evidence.  -- Sepsis was ruled out after study  -- Other - I will add my own diagnosis  -- Disagree - Not applicable / Not valid  -- Disagree - Clinically unable to determine / Unknown  -- Refer to Clinical Documentation Reviewer    PROVIDER RESPONSE TEXT:    Sepsis was ruled out after study.    Query created by: Shana Gomes on 2024 11:24 AM      Electronically signed by:  Lamont Wells DO 2024 12:47 PM          
4 Eyes Skin Assessment     NAME:  Jerome Steel  YOB: 1960  MEDICAL RECORD NUMBER:  [id number]    The patient is being assessed for  Admission    I agree that at least one RN has performed a thorough Head to Toe Skin Assessment on the patient. ALL assessment sites listed below have been assessed.      Areas assessed by both nurses:    Head, Face, Ears, Shoulders, Back, Chest, Arms, Elbows, Hands, Sacrum. Buttock, Coccyx, Ischium, Legs. Feet and Heels, and Under Medical Devices         Does the Patient have a Wound? No noted wound(s)       Maik Prevention initiated by RN: No  Wound Care Orders initiated by RN: No    Pressure Injury (Stage 3,4, Unstageable, DTI, NWPT, and Complex wounds) if present, place Wound referral order by RN under : No    New Ostomies, if present place, Ostomy referral order under : No     Nurse 1 eSignature: Electronically signed by Gretel Serrano RN on 12/23/24 at 10:28 PM EST    **SHARE this note so that the co-signing nurse can place an eSignature**    Nurse 2 eSignature: Electronically signed by Agata Hameed RN on 12/23/24 at 10:29 PM EST   
Nephrology Attending   Inpatient Progress Note    Admit Date: 2024                   PCP: Genet Kraus DO    History of presenting illness:   As per our consult this admission:  The patient is being seen in consultation at the request of Dr. Mejía. The patient is a 64-year-old gentleman who presented to the emergency room at [facility] with chief complaints of scrotal edema and difficulty in urinating. Upon presentation, the patient was found to have a serum sodium of 125 mmol/L, which has increased to 129 mmol/L this morning. He does have a longstanding history of chronic hyponatremia. In fact, he has been seen by me during his hospitalization in  and . The patient had a serum sodium of 125 mmol/L upon presentation and is up to 129 mmol/L. When seen up on the floor, his main complaint is difficulty in voiding and scrotal edema. The patient has had diarrhea for the last 3 weeks. In the ER, CT scan of the abdomen and pelvis showed prostate abscess with urinary cystitis and liver irregularities and right inguinal hernia. The patient was given 1 L bolus in the emergency room.     Subjective:   2024: No complaints or issues overnight.  States breathing is improved.  Complains of back pain.    Vitals:  VITALS: /68   Pulse 85   Temp 97.7 °F (36.5 °C) (Oral)   Resp 20   Ht 1.778 m (5' 10\")   Wt 77.9 kg (171 lb 12.8 oz)   SpO2 97%   BMI 24.65 kg/m²   24HR INTAKE/OUTPUT:   Intake/Output Summary (Last 24 hours) at 2024 1125  Last data filed at 2024 0347  Gross per 24 hour   Intake --   Output 1350 ml   Net -1350 ml     CURRENT PULSE OXIMETRY: SpO2: 97 %  24HR PULSE OXIMETRY RANGE: SpO2  Av.6 %  Min: 95 %  Max: 97 %       I/O:   I/O last 3 completed shifts:  In: -   Out:  [Urine:]  No intake/output data recorded.    Medications:    IV:   sodium chloride      sodium chloride      dextrose        sodium bicarbonate  1,300 mg Oral BID    
Nephrology Attending   Inpatient Progress Note    Admit Date: 2024                   PCP: Genet Kraus DO    History of presenting illness:   As per our consult this admission:  The patient is being seen in consultation at the request of Dr. Mejía. The patient is a 64-year-old gentleman who presented to the emergency room at [facility] with chief complaints of scrotal edema and difficulty in urinating. Upon presentation, the patient was found to have a serum sodium of 125 mmol/L, which has increased to 129 mmol/L this morning. He does have a longstanding history of chronic hyponatremia. In fact, he has been seen by me during his hospitalization in  and . The patient had a serum sodium of 125 mmol/L upon presentation and is up to 129 mmol/L. When seen up on the floor, his main complaint is difficulty in voiding and scrotal edema. The patient has had diarrhea for the last 3 weeks. In the ER, CT scan of the abdomen and pelvis showed prostate abscess with urinary cystitis and liver irregularities and right inguinal hernia. The patient was given 1 L bolus in the emergency room.     Subjective:   2024: No complaints or issues overnight.  States breathing is improved.  No new problems     Vitals:  VITALS: BP (!) 120/58   Pulse 68   Temp 98.2 °F (36.8 °C) (Oral)   Resp 17   Ht 1.778 m (5' 10\")   Wt 77.9 kg (171 lb 12.8 oz)   SpO2 94%   BMI 24.65 kg/m²   24HR INTAKE/OUTPUT:   Intake/Output Summary (Last 24 hours) at 2024 1110  Last data filed at 2024  Gross per 24 hour   Intake --   Output 750 ml   Net -750 ml     CURRENT PULSE OXIMETRY: SpO2: 94 %  24HR PULSE OXIMETRY RANGE: SpO2  Av.8 %  Min: 93 %  Max: 94 %       I/O:   I/O last 3 completed shifts:  In: -   Out: 1050 [Urine:1050]  No intake/output data recorded.    Medications:    IV:   sodium chloride      sodium chloride      sodium chloride      dextrose        sodium chloride flush  5-40 
Pharmacy Note    This patient was ordered Jardiance. Per the Pharmacy & Therapeutics Committee, this medication is non-formulary and not stocked by pharmacy for the reason indicated below. The medication can be reordered at discharge.     Medications in which risks outweigh benefits during hospitalization:          -  SGLT2 inhibitors (ordered in the hospital for an indication other than heart failure or chronic kidney disease)  
Spiritual Health History and Assessment/Progress Note  MARQUEZ  Trevor Kunz    (P) Initial Encounter,  ,  ,      Name: Jerome Steel MRN: [id number]    Age: 64 y.o.     Sex: male   Language: English   Taoism: Religious   Prostate abscess     Date: 12/24/2024                           Spiritual Assessment began in [facility] PIC/ICU        Referral/Consult From: (P) Rounding   Encounter Overview/Reason: (P) Initial Encounter  Service Provided For: (P) Patient    Yee, Belief, Meaning:   Patient is connected with a yee tradition or spiritual practice  Family/Friends No family/friends present      Importance and Influence:  Patient has spiritual/personal beliefs that influence decisions regarding their health  Family/Friends No family/friends present    Community:  Patient is connected with a spiritual community  Family/Friends No family/friends present    Assessment and Plan of Care:     Patient Interventions include: Facilitated expression of thoughts and feelings and Affirmed coping skills/support systems  Family/Friends Interventions include: No family/friends present    Patient Plan of Care: Spiritual Care available upon further referral  Family/Friends Plan of Care: No family/friends present    Electronically signed by Chaplain Karla on 12/24/2024 at 3:19 PM   
Spiritual Health History and Assessment/Progress Note  [facility]    (P) Spiritual/Emotional Needs,  ,  ,      Name: Jerome Steel MRN: [id number]    Age: 64 y.o.     Sex: male   Language: English   Baptist: Religion   Prostate abscess     Date: 12/26/2024                           Spiritual Assessment began in [facility] PIC/ICU        Referral/Consult From: (P) Rounding   Encounter Overview/Reason: (P) Spiritual/Emotional Needs  Service Provided For: (P) Patient    Yee, Belief, Meaning:   Patient is connected with a yee tradition or spiritual practice  Family/Friends No family/friends present      Importance and Influence:  Patient has spiritual/personal beliefs that influence decisions regarding their health  Family/Friends No family/friends present    Community:  Patient is connected with a spiritual community  Family/Friends No family/friends present    Assessment and Plan of Care:     Patient Interventions include: Engaged in theological reflection  Family/Friends Interventions include: No family/friends present    Patient Plan of Care: Spiritual Care available upon further referral  Family/Friends Plan of Care: No family/friends present    Electronically signed by Chaplain Gertrude on 12/26/2024 at 1:27 PM   
3 mg, Q1H PRN   Or  LORazepam, 4 mg, Q1H PRN  sodium chloride flush, 5-40 mL, PRN  sodium chloride, , PRN  acetaminophen, 650 mg, Q6H PRN   Or  acetaminophen, 650 mg, Q6H PRN  traMADol, 50 mg, Q8H PRN  glucose, 4 tablet, PRN  dextrose bolus, 125 mL, PRN   Or  dextrose bolus, 250 mL, PRN  glucagon (rDNA), 1 mg, PRN  dextrose, , Continuous PRN         Objective:    /64   Pulse 82   Temp 98.1 °F (36.7 °C) (Oral)   Resp 20   Ht 1.778 m (5' 10\")   Wt 77.9 kg (171 lb 12.8 oz)   SpO2 93%   BMI 24.65 kg/m²     General Appearance: alert and oriented to person, place and time and in no acute distress, looks generally chronically ill  Skin: warm and dry, good turgor, no rashes, no jaundice  Eyes: conjunctivae normal  Mouth: clear, moist, no thrush  Neck: No JVD  Pulmonary/Chest: no wheezes, rales or rhonchi, normal air movement, no respiratory distress  Cardiovascular: normal rate, normal S1 and S2  Abdomen: soft, non-tender, non-distended, normal bowel sounds  Extremities: no cyanosis, and no edema, no venous stasis dermatitis  Neurologic: no cranial nerve deficit and speech normal, conversational, mental status baseline/normal        Recent Labs     12/27/24  0648 12/27/24  1746 12/28/24  0752   * 129* 128*   K 3.9 4.0 3.9    97* 98   CO2 17* 22 21*   BUN 11 13 12   CREATININE 0.7 0.9 0.8   GLUCOSE 154* 163* 147*   CALCIUM 8.0* 8.6 8.1*       Recent Labs     12/26/24  0543 12/27/24  0648 12/28/24  0752   WBC 12.4* 10.6 6.7   RBC 2.95* 2.95* 2.64*   HGB 8.9* 9.2* 7.9*   HCT 26.6* 27.8* 23.3*   MCV 90.2 94.2 88.3   MCH 30.2 31.2 29.9   MCHC 33.5 33.1 33.9   RDW 14.4 14.5 14.6    250 245   MPV 9.1 9.3 9.2       Radiology:   Nothing to report    Assessment:    Principal Problem:    Prostate abscess  Active Problems:    Type 2 diabetes mellitus without complication, without long-term current use of insulin (HCC)    Hypertension    Hemiparesis affecting left side as late effect of cerebrovascular 
Formula  Weight Used for Energy Requirements: Current  Energy (kcal/day): 1900-2100kcal/day  Weight Used for Protein Requirements: Current  Protein (g/day): 95-115g/day  Method Used for Fluid Requirements: 1 ml/kcal  Fluid (ml/day): per MD - currently on 1500ml fluid restriction    Nutrition Diagnosis:   No nutrition diagnosis at this time     Nutrition Interventions:   Food and/or Nutrient Delivery: Continue Current Diet, Start Oral Nutrition Supplement (Recommend ONS Glucerna Shake once daily)  Nutrition Education/Counseling: Education/Counseling not indicated  Coordination of Nutrition Care: Continue to monitor while inpatient     Goals:  Goals: PO intake 75% or greater, by next RD assessment  Type of Goal: New goal  Previous Goal Met: New Goal    Nutrition Monitoring and Evaluation:   Behavioral-Environmental Outcomes: None Identified  Food/Nutrient Intake Outcomes: Food and Nutrient Intake, Supplement Intake  Physical Signs/Symptoms Outcomes: Biochemical Data, Chewing or Swallowing, GI Status, Fluid Status or Edema, Nutrition Focused Physical Findings, Skin, Weight    Discharge Planning:    Continue Oral Nutrition Supplement, Continue current diet     Etta Llamas RD, LD  Contact: [phone]     
deficiency           Past Surgical History:   Procedure Laterality Date    CARPAL TUNNEL RELEASE  10/01/2016    Dr. Chang    FINGER AMPUTATION      FOOT DEBRIDEMENT Left 2/16/2021    LEFT FOOT DEBRIDEMENT WITH BONE BIOPSY, WOUND VAC APPLICATION performed by Rober Harrell DPM at [facility] OR    FOOT DEBRIDEMENT Left 9/14/2022    LEFT FOOT DEBRIDEMENT INCISION AND DRAINAGE performed by Rober Harrell DPM at [facility] OR    FOOT DEBRIDEMENT Left 9/21/2022    LEFT FOOT PARTIAL CALCANECTOMY AND DEBRIDEMENT performed by Rober Harrell DPM at [facility] OR    KNEE ARTHROSCOPY      TONSILLECTOMY  1969    TRANSESOPHAGEAL ECHOCARDIOGRAM N/A 2/22/2021    TRANSESOPHAGEAL ECHOCARDIOGRAM WITH BUBBLE STUDY performed by Ana Nielsen MD at [facility] ENDOSCOPY    UPPER GASTROINTESTINAL ENDOSCOPY N/A 1/4/2021    EGD BIOPSY performed by Oscar Stephens DO at [facility] ENDOSCOPY       Precautions:  Fall Risk, CRE, MDRO, MRSA with contact iso, seizures, Hx CVA with L hemiparesis, rib fx, Multiple transverse lumbar vertebra fracture, ETOH        Assessment of current deficits     [x] Functional mobility  [x]ADLs  [x] Strength               []Cognition     [x] Functional transfers   [x] IADLs         [x] Safety Awareness   [x]Endurance     [] Fine Coordination              [x] Balance      [] Vision/perception   []Sensation      [x]Gross Motor Coordination  [] ROM  [] Delirium                   [] Motor Control     OT PLAN OF CARE   OT POC based on physician orders, patient diagnosis and results of clinical assessment    Frequency/Duration 1-3 days/wk for 2 weeks PRN   Specific OT Treatment Interventions to include:   * Instruction/training on adapted ADL techniques and AE recommendations to increase functional independence within precautions       * Training on energy conservation strategies, correct breathing pattern and techniques to improve independence/tolerance for self-care routine  * Functional transfer/mobility training/DME recommendations for 
atorvastatin  80 mg Oral Nightly    carvedilol  3.125 mg Oral BID with meals    clopidogrel  75 mg Oral Daily    ferrous sulfate  325 mg Oral Daily with breakfast    gabapentin  300 mg Oral BID    losartan  100 mg Oral Daily    metFORMIN  500 mg Oral BID     pantoprazole  40 mg Oral Daily    vitamin C  500 mg Oral Daily    insulin lispro  0-4 Units SubCUTAneous 4x Daily AC & HS     sodium chloride flush, 5-40 mL, PRN  sodium chloride, , PRN  LORazepam, 1 mg, Q1H PRN   Or  LORazepam, 2 mg, Q1H PRN   Or  LORazepam, 3 mg, Q1H PRN   Or  LORazepam, 4 mg, Q1H PRN  sodium chloride flush, 5-40 mL, PRN  sodium chloride, , PRN  acetaminophen, 650 mg, Q6H PRN   Or  acetaminophen, 650 mg, Q6H PRN  traMADol, 50 mg, Q8H PRN  glucose, 4 tablet, PRN  dextrose bolus, 125 mL, PRN   Or  dextrose bolus, 250 mL, PRN  glucagon (rDNA), 1 mg, PRN  dextrose, , Continuous PRN         Objective:    /69   Pulse 82   Temp 98.7 °F (37.1 °C) (Oral)   Resp 16   Ht 1.778 m (5' 10\")   Wt 77.9 kg (171 lb 12.8 oz)   SpO2 97%   BMI 24.65 kg/m²   General Appearance: alert and oriented to person, place and time and in no acute distress  Skin: warm and dry  Head: normocephalic and atraumatic  Eyes: PERRLA  Neck: neck supple and non tender without mass   Pulmonary/Chest: clear to auscultation bilaterally- no wheezes, rales or rhonchi, normal air movement, no respiratory distress  Cardiovascular: normal rate, normal S1 and S2 and no carotid bruits  Abdomen: soft, protuberant/obese non-tender, non-distended, normal bowel sounds, no masses or organomegaly  Extremities: no cyanosis, no clubbing and no edema; peripheral muscle wasting  Neurologic: no cranial nerve deficit and speech normal        Recent Labs     12/25/24  0700 12/25/24  1802 12/26/24  0543    133 132   K 4.2 6.2* 4.1    104 102   CO2 20* 16* 19*   BUN 12 14 10   CREATININE 0.7 0.8 0.7   GLUCOSE 140* 195* 133*   CALCIUM 8.1* 8.6 8.0*       Recent Labs     
subacute left L1 and L3 left lateral  transverse process fractures as well current in the interim.  6. No nephrolithiasis, urolithiasis or obstructing uropathy evidence as there  is no hydronephrosis.            Assessment: Jerome Steel 64 y.o. male     Possible prostate abscess   Prostatitis  UTI  Anemia   Leukocytosis, resolved   Large hernia into the scrotum     Plan:    Cont the taylor  Cont the antibiotics  VT when stable  Will need outpatient eval  ROBERTO and PSA in the future  CT was reviewed and prostate abscess could be present  Will need TURP in the future for unroofing   This could be done on Monday   RBA of the procedure   Needs medical clearance  This could be done on this admission       Kevin A Michael, DO   [facility]  U[facility]  
10% 250 mL  250 mL IntraVENous PRN Giovanna Apodaca DO        glucagon injection 1 mg  1 mg SubCUTAneous PRN Giovanna Apodaca DO        dextrose 10 % infusion   IntraVENous Continuous PRN Giovanna Apodaca DO        insulin lispro (HUMALOG,ADMELOG) injection vial 0-4 Units  0-4 Units SubCUTAneous 4x Daily AC & HS Giovanna Apodaca, DO   1 Units at 12/27/24 2125       Diet:  ADULT DIET; Regular; No Added Salt (3-4 gm); 1500 ml     EXAM:  Vitals:    12/30/24 0000 12/30/24 0546 12/30/24 0744 12/30/24 0915   BP: 115/65 (!) 109/53 129/67    Pulse: 86 72 69    Resp: 18 19 18 18   Temp: 98.2 °F (36.8 °C) 98.3 °F (36.8 °C) 98.2 °F (36.8 °C)    TempSrc: Oral Oral Oral    SpO2: 94% 94% 90%    Weight:       Height:          General: Obese.  Well appearing. In no apparent distress.    Head: Normocephalic. Atraumatic. Oral mucosa moist.   Neck: No visible masses. No JVD. Conjunctiva clear. Sclera non-icteric.  Heart:  Regular rate. Regular rhythm. No murmur.    Lungs:  Clear to auscultation but distant lung sounds.  Abdomen:  Soft. Nontender.  Distended.    Psychiatric:  Oriented. Normal mood.  Skin: No rash. Warm dry. No lesions.  Bruising  Extremities: No edema in the LE. No edema in the thigh regions. No upper extremity edema       Results:   CBC:   Recent Labs     12/28/24  0752 12/29/24  0740 12/30/24  0818   WBC 6.7 8.3 7.1   HGB 7.9* 8.0* 8.5*    253 261      BMP:   Recent Labs     12/29/24  0740 12/29/24  1837 12/30/24  0818    130* 129*   K 4.2 3.9 3.9    98 97*   CO2 22 21* 22   BUN 13 12 12   CREATININE 0.7 0.8 0.7   GLUCOSE 135* 160* 118*       Hepatic:   Recent Labs     12/28/24  0752 12/29/24  0740 12/30/24  0818   AST 24 34 36   ALT 14 20 19   BILITOT 0.5 0.5 0.5   ALKPHOS 95 130* 106      Troponin: No results for input(s): \"TROPONINI\" in the last 72 hours.   BNP: No results for input(s): \"BNP\" in the last 72 hours.   Lipids: No results for input(s): \"CHOL\", \"HDL\" in the last 72 
TONSILLECTOMY  1969    TRANSESOPHAGEAL ECHOCARDIOGRAM N/A 2/22/2021    TRANSESOPHAGEAL ECHOCARDIOGRAM WITH BUBBLE STUDY performed by Ana Nielsen MD at [facility] ENDOSCOPY    UPPER GASTROINTESTINAL ENDOSCOPY N/A 1/4/2021    EGD BIOPSY performed by Oscar Stephens DO at [facility] ENDOSCOPY       SUBJECTIVE:    Precautions: Up with assistance, falls and alarm , L hemiparesis due to old CVA    Social history: [place]  with  3 steps in and 3 steps to bed/bath level   to enter home.        Equipment owned: Wheelchair, Cane, Wheeled Walker, and Rollator,      AM-PAC Basic Mobility       AM-PAC Basic Mobility - Inpatient   How much help is needed turning from your back to your side while in a flat bed without using bedrails?: A Little  How much help is needed moving from lying on your back to sitting on the side of a flat bed without using bedrails?: A Lot  How much help is needed moving to and from a bed to a chair?: Total  How much help is needed standing up from a chair using your arms?: Total  How much help is needed walking in hospital room?: Total  How much help is needed climbing 3-5 steps with a railing?: Total  AM-PAC Inpatient Mobility Raw Score : 9  AM-PAC Inpatient T-Scale Score : 30.55  Mobility Inpatient CMS 0-100% Score: 81.38  Mobility Inpatient CMS G-Code Modifier : CM    Nursing cleared patient for PT treatment.      OBJECTIVE:   Initial Evaluation  Date: 12/24/2024 Treatment Date:  12/27/2024       Short Term/ Long Term   Goals   Was pt agreeable to Eval/treatment? Yes yes To be met in 5 days   Pain level   0/10   8/10  back    Bed Mobility  Using rails and head of bed elevated:     Rolling: Moderate assist of 1    Supine to sit: Moderate assist of 1    Sit to supine: Moderate assist of 1    Scooting: Moderate assist of 1   Using rails and head of bed elevated:     Rolling: Moderate assist of 1   Supine to sit: Moderate assist of 1   Sit to supine: Moderate assist of 1 
confusion to situation; Follows 2 step directions              Memory:  fair              Sequencing:  fair              Problem solving:  fair              Judgement/safety:  fair-     AM-PAC Daily Activity - Inpatient   How much help is needed for putting on and taking off regular lower body clothing?: Total  How much help is needed for bathing (which includes washing, rinsing, drying)?: A Lot  How much help is needed for toileting (which includes using toilet, bedpan, or urinal)?: A Lot  How much help is needed for putting on and taking off regular upper body clothing?: A Lot  How much help is needed for taking care of personal grooming?: A Little  How much help for eating meals?: A Little  AM-[facility] Inpatient Daily Activity Raw Score: 13  AM-PAC Inpatient ADL T-Scale Score : 32.03  ADL Inpatient CMS 0-100% Score: 63.03  ADL Inpatient CMS G-Code Modifier : CL                Functional Assessment:      Initial Eval Status  Date: 12/24/24 Treatment Status  Date: 12/27/24 STGs = LTGs  Time frame: 10-14 days   Feeding Supervision and set up Supervision  Indep   Grooming Minimal Assist sitting EOB MIN A pt to complete using R UE  Supervision    UB Dressing Moderate Assist with gown management MOD A simulated   Supervision    LB Dressing Maximal Assist due to BM N/t   Minimal Assist    Bathing Maximal Assist due to incontinence N/t  Minimal Assist    Toileting Max A overall with incontinence N/t  Moderate Assist    Bed Mobility  Supine to sit: Moderate Assist   Sit to supine: Moderate Assist    N/t pt declined  Supine to sit: Minimal Assist   Sit to supine: Minimal Assist    Functional Transfers Pt refusing on eval due to loose BM EOB N/t  Minimal Assist    Functional Mobility  NT due to pt overall debility, decreased activity tolerance, balance deficits, safety and fall risk.     N/t  Minimal Assist    Balance Sitting:     Static:  fair+    Dynamic:fair  Standing: NT N/t Sitting:     Static:  good    
assist of 1    Sit to supine: Minimal assist of 1    Scooting: Minimal assist of 1     Transfers Sit to stand: Not assessed due to pt overall debility, decreased activity tolerance, balance deficits, safety and fall risk.      Sit to stand: Minimal assist of 1    Ambulation    not assessed     50 feet using  wheeled walker with Moderate assist of 1    Stair negotiation: ascended and descended   Not assessed     3 steps, Moderate assist of 1      ROM R UE and LE WFL.  L UE and LE limited due to old CVA      Strength BUE:  refer to OT eval  RLE:  5/5  LLE:  3-/5  Increase strength in affected mm groups by 1/3 grade   Balance Sitting EOB:  fair   Dynamic Standing:  not assessed   Sitting EOB:  good   Dynamic Standing: good      Patient is Alert & Oriented x person, place, time, and situation and follows directions    Sensation:  Patient  denies numbness/tingling   Edema:  no   Endurance: poor     Vitals: room air   Blood Pressure at rest  Blood Pressure during session    Heart Rate at rest -- Heart Rate during session --   SPO2 at rest --%  SPO2 during session --%     Patient education  Patient educated on role of Physical Therapy, risks of immobility, safety and plan of care and  importance of mobility while in hospital      Patient response to education:   Pt verbalized understanding Pt demonstrated skill Pt requires further education in this area   Yes Partial Yes      Treatment:  Patient practiced and was instructed/facilitated in the following treatment: Patient  Sat edge of bed 10 minutes with Minimal assist of 1 to increase dynamic sitting balance and activity tolerance.      Therapeutic Exercises:  not performed  x -- reps.       At end of session, patient in bed with alarm call light and phone within reach,  all lines and tubes intact, nursing notified.      Patient would benefit from continued skilled Physical Therapy to improve functional independence and quality of life.         Patient's/ family goals   Go 
hours.    Invalid input(s): \"LDLCALCU\"   ABGs: No results found for: \"PHART\", \"PO2ART\", \"JOC1HPW\"   INR: No results for input(s): \"INR\" in the last 72 hours.    Most recent blood work:   Recent Labs     12/26/24  0543 12/27/24  0648 12/28/24  0752   WBC 12.4* 10.6 6.7   HGB 8.9* 9.2* 7.9*    250 245          Lab Results   Component Value Date    ALBUMIN 2.5 (L) 12/28/2024     Lab Results   Component Value Date    CREATININE 0.8 12/28/2024    CREATININE 0.9 12/27/2024    CREATININE 0.7 12/27/2024       In: -   Out: 1275 [Urine:1275]     Assessment and plan  Hyponatremia.    Hyponatremia in this patient of undetermined etiology.   The patient's urine sodium and osmolality suggestive of underlying syndrome of inappropriate antidiuretic hormone secretion picture and the patient is on gabapentin, which can be associated with syndrome of inappropriate antidiuretic hormone secretion. Nevertheless, the patient's serum sodium has improved with hydration.   In addition, we need to consider possibility of urinary obstruction.  Cole catheter has been inserted.  Serum sodium levels have improved.    Urology consulted: Concern for prostatitis +/- prostate abscess  Cole in place  Received saline bolus today  Continue to monitor BMP every 12  hours for now.   Last Sodium 128 mmol/L.  mg/dL.   Sodium down trended from 133 on 12/25 to 128 on 12/26 where it has remained.   Will repeat urine studies       Benign essential hypertension.  Hypertension is well controlled with blood pressure at target of less than 130/80 mmHg.     Metabolic acidosis.    Metabolic acidosis with with normal anion gap.    Urine studies suggestive of renal tubular acidosis given urine pH of 6 seems more likely to be distal RTA. Currently not on medications that could induce an RTA.   Can be seen in elevated levels of vitamin D and vitamin D products  Autoimmune conditions such as AI hepatitis PBC SLE and Sjogren's can be related to distal 
bicarbonate level 17 mmol/L, Chloride 100 mmol/dL, AG 11 mmol/L today with an albumin on 2.3 g/dL.  Plan  Will give sodium bicarbonate 1300 mg 2 times daily.  Will get Vitamin D 1 25 ABD 25 level.     Anemia second to iron deficiency.  Iron studies reflect iron deficiency.  Will supplement iron. Patient does have a prior history of iron deficiency anemia.     Urinary obstruction.  Leave the Cole catheter in place.  See above.  Urology following.     Care reviewed with the patient's family as available.    Blake Hung MD  12/27/2024    
computer voice recognition software. Every effort has been made to ensure accuracy, however; inadvertent computerized transcription errors may be present.

## 2024-12-31 NOTE — CARE COORDINATION
12/31/24 1300 CM note: no covid testing. Room air. Cole catheter placed this admit- per urology continue at discharge and change every 4 weeks. Discharge order noted. Discharge plan is [facility]. Per [facility], they have obtained auth for pt to come to their facility and it is good through 1/3/24.  KELLY is signed. HENS completed. Arranged ambulance transport with [facility] for  at 2:30. Ambulance form on soft chart. Notified pt, pts LYNETTE Gonzalez, charge nurse Giovanna, and [facility] of  time. Electronically signed by Tanesha Wang RN on 12/31/2024 at 1:03 PM

## 2024-12-31 NOTE — DISCHARGE SUMMARY
tamsulosin (FLOMAX) 0.4 MG capsule Take 1 capsule by mouth nightly      sodium chloride 1 g tablet Take 1 tablet by mouth 3 times daily (with meals)           CONTINUE these medications which have CHANGED    Details   traMADol (ULTRAM) 50 MG tablet Take 1 tablet by mouth every 8 hours as needed for Pain for up to 3 days. Max Daily Amount: 150 mg  Qty: 9 tablet, Refills: 0    Comments: Reduce doses taken as pain becomes manageable  Associated Diagnoses: Cerebrovascular accident (CVA), unspecified mechanism ([facility]); Hemiparesis affecting left side as late effect of cerebrovascular accident ([facility])           CONTINUE these medications which have NOT CHANGED    Details   gabapentin (NEURONTIN) 300 MG capsule Take 1 capsule by mouth 2 times daily for 180 days. Intended supply: 90 days  Qty: 180 capsule, Refills: 1    Associated Diagnoses: Medication refill      atorvastatin (LIPITOR) 80 MG tablet Take 1 tablet by mouth daily  Qty: 90 tablet, Refills: 3    Associated Diagnoses: Peripheral arterial disease ([facility]); History of stroke      empagliflozin (JARDIANCE) 10 MG tablet Take 1 tablet by mouth daily  Qty: 90 tablet, Refills: 1    Associated Diagnoses: 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 2 diabetes mellitus, with necrosis of bone ([facility]); Coronary artery disease involving native coronary artery of native heart without angina pectoris      pantoprazole (PROTONIX) 40 MG tablet Take 1 tablet by mouth daily  Qty: 90 tablet, Refills: 1    Associated Diagnoses: Gastroesophageal reflux disease without esophagitis      metFORMIN (GLUCOPHAGE) 500 MG tablet Take 1 tablet by mouth 2 times daily (with meals)  Qty: 180 tablet, Refills: 1    Associated Diagnoses: Type 2 diabetes mellitus without complication, without long-term current use of insulin ([facility])      vitamin D (ERGOCALCIFEROL) 1.25 MG (96223 UT) CAPS capsule Take 1 capsule by mouth Once a week at 5 PM  Qty: 12 capsule, Refills: 1      losartan (COZAAR) 50 MG

## 2025-01-02 LAB — ANA SER QL IA: NEGATIVE

## 2025-01-23 DIAGNOSIS — I63.9 CEREBROVASCULAR ACCIDENT (CVA), UNSPECIFIED MECHANISM (HCC): ICD-10-CM

## 2025-01-24 RX ORDER — CLOPIDOGREL BISULFATE 75 MG/1
75 TABLET ORAL DAILY
Qty: 90 TABLET | Refills: 1 | Status: SHIPPED | OUTPATIENT
Start: 2025-01-24

## 2025-01-24 NOTE — TELEPHONE ENCOUNTER
Name of Medication(s) Requested:  Requested Prescriptions     Pending Prescriptions Disp Refills    clopidogrel (PLAVIX) 75 MG tablet [Pharmacy Med Name: CLOPIDOGREL 75 MG TABLET] 90 tablet 1     Sig: TAKE 1 TABLET BY MOUTH EVERY DAY       Medication is on current medication list Yes    Dosage and directions were verified? Yes    Quantity verified: 90 day supply     Pharmacy Verified?  Yes    Last Appointment:  09/30/2024    Future appts:  No future appointments.     (If no appt send self scheduling link. .REFILLAPPT)  Scheduling request sent?     [] Yes  [x] No    Does patient need updated?  [] Yes  [x] No

## 2025-02-16 ENCOUNTER — APPOINTMENT (OUTPATIENT)
Dept: CT IMAGING | Age: 65
DRG: 393 | End: 2025-02-16
Payer: COMMERCIAL

## 2025-02-16 ENCOUNTER — HOSPITAL ENCOUNTER (INPATIENT)
Age: 65
LOS: 7 days | Discharge: NURSING, REHAB OR LONG-TERM CARE | DRG: 393 | End: 2025-02-23
Attending: EMERGENCY MEDICINE | Admitting: FAMILY MEDICINE
Payer: COMMERCIAL

## 2025-02-16 DIAGNOSIS — K40.30: Primary | ICD-10-CM

## 2025-02-16 DIAGNOSIS — I25.10 CAD (CORONARY ARTERY DISEASE): ICD-10-CM

## 2025-02-16 DIAGNOSIS — R06.02 SHORTNESS OF BREATH: ICD-10-CM

## 2025-02-16 DIAGNOSIS — Z01.810 PRE-OPERATIVE CARDIOVASCULAR EXAMINATION: ICD-10-CM

## 2025-02-16 DIAGNOSIS — L89.616 PRESSURE INJURY OF DEEP TISSUE OF RIGHT HEEL: ICD-10-CM

## 2025-02-16 DIAGNOSIS — E11.9 TYPE 2 DIABETES MELLITUS WITHOUT COMPLICATION, WITHOUT LONG-TERM CURRENT USE OF INSULIN (HCC): ICD-10-CM

## 2025-02-16 DIAGNOSIS — I73.9 PERIPHERAL ARTERIAL DISEASE: ICD-10-CM

## 2025-02-16 PROBLEM — K56.609 SBO (SMALL BOWEL OBSTRUCTION) (HCC): Status: ACTIVE | Noted: 2025-02-16

## 2025-02-16 LAB
ALBUMIN SERPL-MCNC: 3.6 G/DL (ref 3.5–5.2)
ALP SERPL-CCNC: 175 U/L (ref 40–129)
ALT SERPL-CCNC: 11 U/L (ref 0–40)
ANION GAP SERPL CALCULATED.3IONS-SCNC: 11 MMOL/L (ref 7–16)
AST SERPL-CCNC: 16 U/L (ref 0–39)
BASOPHILS # BLD: 0.06 K/UL (ref 0–0.2)
BASOPHILS NFR BLD: 1 % (ref 0–2)
BILIRUB SERPL-MCNC: 0.7 MG/DL (ref 0–1.2)
BUN SERPL-MCNC: 9 MG/DL (ref 6–23)
CALCIUM SERPL-MCNC: 9.4 MG/DL (ref 8.6–10.2)
CHLORIDE SERPL-SCNC: 99 MMOL/L (ref 98–107)
CO2 SERPL-SCNC: 26 MMOL/L (ref 22–29)
CREAT SERPL-MCNC: 0.6 MG/DL (ref 0.7–1.2)
EOSINOPHIL # BLD: 0.31 K/UL (ref 0.05–0.5)
EOSINOPHILS RELATIVE PERCENT: 2 % (ref 0–6)
ERYTHROCYTE [DISTWIDTH] IN BLOOD BY AUTOMATED COUNT: 15.7 % (ref 11.5–15)
GFR, ESTIMATED: >90 ML/MIN/1.73M2
GLUCOSE SERPL-MCNC: 108 MG/DL (ref 74–99)
HCT VFR BLD AUTO: 34.3 % (ref 37–54)
HGB BLD-MCNC: 11.1 G/DL (ref 12.5–16.5)
IMM GRANULOCYTES # BLD AUTO: 0.11 K/UL (ref 0–0.58)
IMM GRANULOCYTES NFR BLD: 1 % (ref 0–5)
LACTATE BLDV-SCNC: 1.4 MMOL/L (ref 0.5–1.9)
LIPASE SERPL-CCNC: 9 U/L (ref 13–60)
LYMPHOCYTES NFR BLD: 1.57 K/UL (ref 1.5–4)
LYMPHOCYTES RELATIVE PERCENT: 12 % (ref 20–42)
MCH RBC QN AUTO: 28.7 PG (ref 26–35)
MCHC RBC AUTO-ENTMCNC: 32.4 G/DL (ref 32–34.5)
MCV RBC AUTO: 88.6 FL (ref 80–99.9)
MONOCYTES NFR BLD: 0.51 K/UL (ref 0.1–0.95)
MONOCYTES NFR BLD: 4 % (ref 2–12)
NEUTROPHILS NFR BLD: 80 % (ref 43–80)
NEUTS SEG NFR BLD: 10.14 K/UL (ref 1.8–7.3)
PLATELET # BLD AUTO: 390 K/UL (ref 130–450)
PMV BLD AUTO: 9.5 FL (ref 7–12)
POTASSIUM SERPL-SCNC: 3.3 MMOL/L (ref 3.5–5)
PROT SERPL-MCNC: 7.4 G/DL (ref 6.4–8.3)
RBC # BLD AUTO: 3.87 M/UL (ref 3.8–5.8)
SODIUM SERPL-SCNC: 136 MMOL/L (ref 132–146)
WBC OTHER # BLD: 12.7 K/UL (ref 4.5–11.5)

## 2025-02-16 PROCEDURE — 96374 THER/PROPH/DIAG INJ IV PUSH: CPT

## 2025-02-16 PROCEDURE — 87449 NOS EACH ORGANISM AG IA: CPT

## 2025-02-16 PROCEDURE — 99223 1ST HOSP IP/OBS HIGH 75: CPT | Performed by: FAMILY MEDICINE

## 2025-02-16 PROCEDURE — 74177 CT ABD & PELVIS W/CONTRAST: CPT

## 2025-02-16 PROCEDURE — 83036 HEMOGLOBIN GLYCOSYLATED A1C: CPT

## 2025-02-16 PROCEDURE — 84145 PROCALCITONIN (PCT): CPT

## 2025-02-16 PROCEDURE — 99285 EMERGENCY DEPT VISIT HI MDM: CPT

## 2025-02-16 PROCEDURE — 85025 COMPLETE CBC W/AUTO DIFF WBC: CPT

## 2025-02-16 PROCEDURE — 83605 ASSAY OF LACTIC ACID: CPT

## 2025-02-16 PROCEDURE — 6360000002 HC RX W HCPCS: Performed by: EMERGENCY MEDICINE

## 2025-02-16 PROCEDURE — 6360000004 HC RX CONTRAST MEDICATION: Performed by: RADIOLOGY

## 2025-02-16 PROCEDURE — 80053 COMPREHEN METABOLIC PANEL: CPT

## 2025-02-16 PROCEDURE — 87899 AGENT NOS ASSAY W/OPTIC: CPT

## 2025-02-16 PROCEDURE — 1200000000 HC SEMI PRIVATE

## 2025-02-16 PROCEDURE — 83690 ASSAY OF LIPASE: CPT

## 2025-02-16 RX ORDER — SODIUM CHLORIDE 1 G/1
1 TABLET ORAL
Status: DISCONTINUED | OUTPATIENT
Start: 2025-02-17 | End: 2025-02-23 | Stop reason: HOSPADM

## 2025-02-16 RX ORDER — SODIUM CHLORIDE 0.9 % (FLUSH) 0.9 %
5-40 SYRINGE (ML) INJECTION PRN
Status: DISCONTINUED | OUTPATIENT
Start: 2025-02-16 | End: 2025-02-23 | Stop reason: HOSPADM

## 2025-02-16 RX ORDER — ASCORBIC ACID 500 MG
500 TABLET ORAL DAILY
Status: DISCONTINUED | OUTPATIENT
Start: 2025-02-17 | End: 2025-02-23 | Stop reason: HOSPADM

## 2025-02-16 RX ORDER — FOLIC ACID 1 MG/1
1000 TABLET ORAL DAILY
Status: DISCONTINUED | OUTPATIENT
Start: 2025-02-17 | End: 2025-02-23 | Stop reason: HOSPADM

## 2025-02-16 RX ORDER — SODIUM CHLORIDE 0.9 % (FLUSH) 0.9 %
5-40 SYRINGE (ML) INJECTION EVERY 12 HOURS SCHEDULED
Status: DISCONTINUED | OUTPATIENT
Start: 2025-02-16 | End: 2025-02-23 | Stop reason: HOSPADM

## 2025-02-16 RX ORDER — IPRATROPIUM BROMIDE AND ALBUTEROL SULFATE 2.5; .5 MG/3ML; MG/3ML
1 SOLUTION RESPIRATORY (INHALATION) EVERY 4 HOURS PRN
Status: DISCONTINUED | OUTPATIENT
Start: 2025-02-16 | End: 2025-02-23 | Stop reason: HOSPADM

## 2025-02-16 RX ORDER — GLUCAGON 1 MG/ML
1 KIT INJECTION PRN
Status: DISCONTINUED | OUTPATIENT
Start: 2025-02-16 | End: 2025-02-23 | Stop reason: HOSPADM

## 2025-02-16 RX ORDER — MORPHINE SULFATE 4 MG/ML
4 INJECTION, SOLUTION INTRAMUSCULAR; INTRAVENOUS ONCE
Status: COMPLETED | OUTPATIENT
Start: 2025-02-16 | End: 2025-02-16

## 2025-02-16 RX ORDER — IOPAMIDOL 755 MG/ML
70 INJECTION, SOLUTION INTRAVASCULAR
Status: COMPLETED | OUTPATIENT
Start: 2025-02-16 | End: 2025-02-16

## 2025-02-16 RX ORDER — MORPHINE SULFATE 4 MG/ML
4 INJECTION, SOLUTION INTRAMUSCULAR; INTRAVENOUS EVERY 4 HOURS PRN
Status: DISCONTINUED | OUTPATIENT
Start: 2025-02-16 | End: 2025-02-23 | Stop reason: HOSPADM

## 2025-02-16 RX ORDER — LOSARTAN POTASSIUM 50 MG/1
100 TABLET ORAL DAILY
Status: DISCONTINUED | OUTPATIENT
Start: 2025-02-17 | End: 2025-02-23

## 2025-02-16 RX ORDER — MORPHINE SULFATE 2 MG/ML
2 INJECTION, SOLUTION INTRAMUSCULAR; INTRAVENOUS EVERY 4 HOURS PRN
Status: DISCONTINUED | OUTPATIENT
Start: 2025-02-16 | End: 2025-02-23 | Stop reason: HOSPADM

## 2025-02-16 RX ORDER — ACETAMINOPHEN 650 MG/1
650 SUPPOSITORY RECTAL EVERY 6 HOURS PRN
Status: DISCONTINUED | OUTPATIENT
Start: 2025-02-16 | End: 2025-02-23 | Stop reason: HOSPADM

## 2025-02-16 RX ORDER — FERROUS SULFATE 325(65) MG
325 TABLET ORAL
Status: DISCONTINUED | OUTPATIENT
Start: 2025-02-17 | End: 2025-02-23 | Stop reason: HOSPADM

## 2025-02-16 RX ORDER — CARVEDILOL 3.12 MG/1
3.12 TABLET ORAL 2 TIMES DAILY WITH MEALS
Status: DISCONTINUED | OUTPATIENT
Start: 2025-02-17 | End: 2025-02-23 | Stop reason: HOSPADM

## 2025-02-16 RX ORDER — LEVOFLOXACIN 5 MG/ML
750 INJECTION, SOLUTION INTRAVENOUS EVERY 24 HOURS
Status: COMPLETED | OUTPATIENT
Start: 2025-02-16 | End: 2025-02-22

## 2025-02-16 RX ORDER — GABAPENTIN 300 MG/1
300 CAPSULE ORAL 2 TIMES DAILY
Status: DISCONTINUED | OUTPATIENT
Start: 2025-02-17 | End: 2025-02-23 | Stop reason: HOSPADM

## 2025-02-16 RX ORDER — INSULIN LISPRO 100 [IU]/ML
0-4 INJECTION, SOLUTION INTRAVENOUS; SUBCUTANEOUS
Status: DISCONTINUED | OUTPATIENT
Start: 2025-02-17 | End: 2025-02-23 | Stop reason: HOSPADM

## 2025-02-16 RX ORDER — PANTOPRAZOLE SODIUM 40 MG/1
40 TABLET, DELAYED RELEASE ORAL DAILY
Status: DISCONTINUED | OUTPATIENT
Start: 2025-02-17 | End: 2025-02-23 | Stop reason: HOSPADM

## 2025-02-16 RX ORDER — DEXTROSE MONOHYDRATE 100 MG/ML
INJECTION, SOLUTION INTRAVENOUS CONTINUOUS PRN
Status: DISCONTINUED | OUTPATIENT
Start: 2025-02-16 | End: 2025-02-23 | Stop reason: HOSPADM

## 2025-02-16 RX ORDER — SODIUM CHLORIDE 9 MG/ML
INJECTION, SOLUTION INTRAVENOUS CONTINUOUS
Status: ACTIVE | OUTPATIENT
Start: 2025-02-16 | End: 2025-02-17

## 2025-02-16 RX ORDER — ATORVASTATIN CALCIUM 40 MG/1
80 TABLET, FILM COATED ORAL NIGHTLY
Status: DISCONTINUED | OUTPATIENT
Start: 2025-02-17 | End: 2025-02-23 | Stop reason: HOSPADM

## 2025-02-16 RX ORDER — ACETAMINOPHEN 325 MG/1
650 TABLET ORAL EVERY 6 HOURS PRN
Status: DISCONTINUED | OUTPATIENT
Start: 2025-02-16 | End: 2025-02-23 | Stop reason: HOSPADM

## 2025-02-16 RX ORDER — SODIUM BICARBONATE 650 MG/1
1300 TABLET ORAL 2 TIMES DAILY
Status: DISCONTINUED | OUTPATIENT
Start: 2025-02-17 | End: 2025-02-23 | Stop reason: HOSPADM

## 2025-02-16 RX ORDER — TAMSULOSIN HYDROCHLORIDE 0.4 MG/1
0.4 CAPSULE ORAL NIGHTLY
Status: DISCONTINUED | OUTPATIENT
Start: 2025-02-17 | End: 2025-02-23 | Stop reason: HOSPADM

## 2025-02-16 RX ORDER — ENOXAPARIN SODIUM 100 MG/ML
40 INJECTION SUBCUTANEOUS DAILY
Status: DISCONTINUED | OUTPATIENT
Start: 2025-02-17 | End: 2025-02-23 | Stop reason: HOSPADM

## 2025-02-16 RX ADMIN — IOPAMIDOL 70 ML: 755 INJECTION, SOLUTION INTRAVENOUS at 18:51

## 2025-02-16 RX ADMIN — MORPHINE SULFATE 4 MG: 4 INJECTION, SOLUTION INTRAMUSCULAR; INTRAVENOUS at 21:45

## 2025-02-16 ASSESSMENT — PAIN DESCRIPTION - DESCRIPTORS: DESCRIPTORS: BURNING;ACHING;SHARP

## 2025-02-16 ASSESSMENT — PAIN SCALES - GENERAL
PAINLEVEL_OUTOF10: 8
PAINLEVEL_OUTOF10: 9

## 2025-02-16 ASSESSMENT — PAIN DESCRIPTION - LOCATION: LOCATION: GROIN

## 2025-02-16 ASSESSMENT — PAIN - FUNCTIONAL ASSESSMENT: PAIN_FUNCTIONAL_ASSESSMENT: 0-10

## 2025-02-16 NOTE — ED PROVIDER NOTES
64-year-old male presenting from nursing home with concern about lower abdominal discomfort, scrotal discomfort.  He reports a known history of a hernia.  He thinks he is at the nursing home to get off of the antiplatelet medications.  However, he is there for rehabilitation and is yet to have surgery on the abdomen for the hernia.  Awake, alert, oriented x 4.         History reviewed. No pertinent family history.  Past Surgical History:   Procedure Laterality Date    CARPAL TUNNEL RELEASE  10/01/2016    Dr. Chang    FINGER AMPUTATION      FOOT DEBRIDEMENT Left 2/16/2021    LEFT FOOT DEBRIDEMENT WITH BONE BIOPSY, WOUND VAC APPLICATION performed by Rober Harrell DPM at [facility] OR    FOOT DEBRIDEMENT Left 9/14/2022    LEFT FOOT DEBRIDEMENT INCISION AND DRAINAGE performed by Rober Harrell DPM at [facility] OR    FOOT DEBRIDEMENT Left 9/21/2022    LEFT FOOT PARTIAL CALCANECTOMY AND DEBRIDEMENT performed by Rober Harrell DPM at [facility] OR    KNEE ARTHROSCOPY      TONSILLECTOMY  1969    TRANSESOPHAGEAL ECHOCARDIOGRAM N/A 2/22/2021    TRANSESOPHAGEAL ECHOCARDIOGRAM WITH BUBBLE STUDY performed by Ana Nielsen MD at [facility] ENDOSCOPY    UPPER GASTROINTESTINAL ENDOSCOPY N/A 1/4/2021    EGD BIOPSY performed by Oscar Stephens DO at [facility] ENDOSCOPY       Review of Systems   Constitutional:  Negative for chills and fever.   Respiratory:  Negative for chest tightness and shortness of breath.    Gastrointestinal:  Positive for abdominal pain.        Physical Exam  Constitutional:       General: He is not in acute distress.     Appearance: He is well-developed.   HENT:      Head: Normocephalic and atraumatic.   Eyes:      Pupils: Pupils are equal, round, and reactive to light.   Neck:      Thyroid: No thyromegaly.   Cardiovascular:      Rate and Rhythm: Normal rate and regular rhythm.   Pulmonary:      Effort: Pulmonary effort is normal. No respiratory distress.      Breath sounds: Normal breath sounds. No wheezing.   Abdominal:

## 2025-02-16 NOTE — ED TRIAGE NOTES
Patient has history of stroke with Left sided deficits.  Patient has a suprapubic catheter in place upon arrival.

## 2025-02-17 ENCOUNTER — APPOINTMENT (OUTPATIENT)
Dept: CT IMAGING | Age: 65
DRG: 393 | End: 2025-02-17
Payer: COMMERCIAL

## 2025-02-17 ENCOUNTER — APPOINTMENT (OUTPATIENT)
Dept: GENERAL RADIOLOGY | Age: 65
DRG: 393 | End: 2025-02-17
Payer: COMMERCIAL

## 2025-02-17 PROBLEM — K40.30: Status: ACTIVE | Noted: 2025-02-17

## 2025-02-17 LAB
GLUCOSE BLD-MCNC: 88 MG/DL (ref 74–99)
GLUCOSE BLD-MCNC: 89 MG/DL (ref 74–99)
GLUCOSE BLD-MCNC: 95 MG/DL (ref 74–99)
GLUCOSE BLD-MCNC: 98 MG/DL (ref 74–99)
HBA1C MFR BLD: 5.2 % (ref 4–5.6)
L PNEUMO1 AG UR QL IA.RAPID: NEGATIVE
PROCALCITONIN SERPL-MCNC: 0.04 NG/ML (ref 0–0.08)
S PNEUM AG SPEC QL: NEGATIVE
SPECIMEN SOURCE: NORMAL

## 2025-02-17 PROCEDURE — 99222 1ST HOSP IP/OBS MODERATE 55: CPT | Performed by: SURGERY

## 2025-02-17 PROCEDURE — 99232 SBSQ HOSP IP/OBS MODERATE 35: CPT | Performed by: INTERNAL MEDICINE

## 2025-02-17 PROCEDURE — 6360000002 HC RX W HCPCS: Performed by: FAMILY MEDICINE

## 2025-02-17 PROCEDURE — 6370000000 HC RX 637 (ALT 250 FOR IP): Performed by: INTERNAL MEDICINE

## 2025-02-17 PROCEDURE — 97530 THERAPEUTIC ACTIVITIES: CPT | Performed by: PHYSICAL THERAPIST

## 2025-02-17 PROCEDURE — 97161 PT EVAL LOW COMPLEX 20 MIN: CPT | Performed by: PHYSICAL THERAPIST

## 2025-02-17 PROCEDURE — 2580000003 HC RX 258: Performed by: FAMILY MEDICINE

## 2025-02-17 PROCEDURE — 1200000000 HC SEMI PRIVATE

## 2025-02-17 PROCEDURE — 82962 GLUCOSE BLOOD TEST: CPT

## 2025-02-17 PROCEDURE — 97110 THERAPEUTIC EXERCISES: CPT | Performed by: PHYSICAL THERAPIST

## 2025-02-17 PROCEDURE — 2500000003 HC RX 250 WO HCPCS: Performed by: FAMILY MEDICINE

## 2025-02-17 PROCEDURE — 0202U NFCT DS 22 TRGT SARS-COV-2: CPT

## 2025-02-17 PROCEDURE — 6370000000 HC RX 637 (ALT 250 FOR IP): Performed by: FAMILY MEDICINE

## 2025-02-17 PROCEDURE — 73630 X-RAY EXAM OF FOOT: CPT

## 2025-02-17 PROCEDURE — 71250 CT THORAX DX C-: CPT

## 2025-02-17 RX ORDER — LOPERAMIDE HYDROCHLORIDE 2 MG/1
2 TABLET ORAL
COMMUNITY

## 2025-02-17 RX ORDER — TRAMADOL HYDROCHLORIDE 50 MG/1
50 TABLET ORAL EVERY 6 HOURS PRN
Status: DISCONTINUED | OUTPATIENT
Start: 2025-02-17 | End: 2025-02-23 | Stop reason: HOSPADM

## 2025-02-17 RX ORDER — SENNA AND DOCUSATE SODIUM 50; 8.6 MG/1; MG/1
1 TABLET, FILM COATED ORAL 2 TIMES DAILY
COMMUNITY

## 2025-02-17 RX ORDER — FAMOTIDINE 40 MG/1
40 TABLET, FILM COATED ORAL DAILY
COMMUNITY

## 2025-02-17 RX ORDER — CARBOXYMETHYLCELLULOSE SODIUM 10 MG/ML
2 SOLUTION/ DROPS OPHTHALMIC EVERY 8 HOURS PRN
COMMUNITY

## 2025-02-17 RX ORDER — AMLODIPINE BESYLATE 2.5 MG/1
2.5 TABLET ORAL DAILY
COMMUNITY

## 2025-02-17 RX ORDER — CALCIUM CARBONATE 500 MG/1
2 TABLET, CHEWABLE ORAL EVERY 12 HOURS PRN
COMMUNITY

## 2025-02-17 RX ORDER — TRAMADOL HYDROCHLORIDE 50 MG/1
50 TABLET ORAL EVERY 8 HOURS PRN
COMMUNITY

## 2025-02-17 RX ORDER — BISACODYL 10 MG
10 SUPPOSITORY, RECTAL RECTAL PRN
COMMUNITY

## 2025-02-17 RX ORDER — ALUMINA, MAGNESIA, AND SIMETHICONE 2400; 2400; 240 MG/30ML; MG/30ML; MG/30ML
30 SUSPENSION ORAL EVERY 6 HOURS PRN
COMMUNITY

## 2025-02-17 RX ADMIN — SODIUM CHLORIDE: 0.9 INJECTION, SOLUTION INTRAVENOUS at 00:03

## 2025-02-17 RX ADMIN — TRAMADOL HYDROCHLORIDE 50 MG: 50 TABLET, COATED ORAL at 18:35

## 2025-02-17 RX ADMIN — CARVEDILOL 3.12 MG: 3.12 TABLET, FILM COATED ORAL at 20:38

## 2025-02-17 RX ADMIN — MORPHINE SULFATE 4 MG: 4 INJECTION, SOLUTION INTRAMUSCULAR; INTRAVENOUS at 00:19

## 2025-02-17 RX ADMIN — GABAPENTIN 300 MG: 300 CAPSULE ORAL at 20:38

## 2025-02-17 RX ADMIN — MORPHINE SULFATE 4 MG: 4 INJECTION, SOLUTION INTRAMUSCULAR; INTRAVENOUS at 10:09

## 2025-02-17 RX ADMIN — LEVOFLOXACIN 750 MG: 5 INJECTION, SOLUTION INTRAVENOUS at 00:02

## 2025-02-17 RX ADMIN — ATORVASTATIN CALCIUM 80 MG: 40 TABLET, FILM COATED ORAL at 20:38

## 2025-02-17 RX ADMIN — Medication 10 ML: at 20:39

## 2025-02-17 RX ADMIN — TAMSULOSIN HYDROCHLORIDE 0.4 MG: 0.4 CAPSULE ORAL at 20:38

## 2025-02-17 RX ADMIN — LEVOFLOXACIN 750 MG: 5 INJECTION, SOLUTION INTRAVENOUS at 22:26

## 2025-02-17 RX ADMIN — SODIUM BICARBONATE 650 MG TABLET 1300 MG: at 20:38

## 2025-02-17 ASSESSMENT — PAIN SCALES - GENERAL
PAINLEVEL_OUTOF10: 7
PAINLEVEL_OUTOF10: 0
PAINLEVEL_OUTOF10: 9

## 2025-02-17 NOTE — FLOWSHEET NOTE
Inpatient Wound Care    Admit Date: 2/16/2025  5:08 PM    Reason for consult:  right heel    Significant history:    Past Medical History:   Diagnosis Date    Arthritis     Bilateral carpal tunnel syndrome 2016    Cerebral artery occlusion with cerebral infarction ([facility])     Chronic back pain     Coronary artery disease involving native coronary artery of native heart without angina pectoris 09/16/2022    CVA (cerebral vascular accident) ([facility]) 11/2015, 2017    Diabetes mellitus ([facility])     Type 2    Diabetic foot ulcer with osteomyelitis ([facility]) 12/21/2021    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 2 diabetes mellitus, with necrosis of bone ([facility]) 12/21/2021    Hemiparesis affecting left side as late effect of cerebrovascular accident ([facility])     LEFT SIDE NON DOMINANT FOLLOWING STROKE    Hyperlipidemia     Hypertension     Moderate aortic regurgitation     Peripheral vascular angioplasty status 12/21/2021    PFO (patent foramen ovale)     Ulcer of left heel, with necrosis of bone ([facility]) 12/27/2021    Ulcer of left heel, with necrosis of muscle ([facility]) 12/21/2021    Vitamin D deficiency          Findings:     02/17/25 1250   Wound 02/17/25 Heel Right   Date First Assessed/Time First Assessed: 02/17/25 1250   Present on Original Admission: Yes  Primary Wound Type: Pressure Injury  Location: Heel  Wound Location Orientation: Right   Wound Image    Wound Etiology Pressure Unstageable   Wound Cleansed Cleansed with saline   Dressing/Treatment Foam   Wound Length (cm) 4 cm   Wound Width (cm) 6 cm   Wound Surface Area (cm^2) 24 cm^2   Wound Assessment Eschar dry   Drainage Amount None (dry)   Odor None         Impression:  unstagable wound to right heel    Interventions in place:  foam dressing    Plan:  Cont mepilex  Mon wed Friday  Pt states at this time does not want a podiatry consult  States he did see Dr Harrell in the past  Encouraged him to see podiatry  Explained need for follow up with this wound  He will discuss

## 2025-02-17 NOTE — H&P
non tender, non distended, bowel sounds present  :  right groin hernia  MSK: sarcopenia present  Ext:  Moving right extremities 5/5, limited movement Left sided extremities, no edema, pulses present  Skin:  Warm and dry, no rashes on visible skin, right heel wound  Psych: non-anxious affect  Neuro:  PERRL, Alert, grossly nonfocal; following commands    LABS:  Recent Labs     02/16/25  1727      K 3.3*   CL 99   CO2 26   BUN 9   CREATININE 0.6*   GLUCOSE 108*   CALCIUM 9.4       Recent Labs     02/16/25  1727   WBC 12.7*   RBC 3.87   HGB 11.1*   HCT 34.3*   MCV 88.6   MCH 28.7   MCHC 32.4   RDW 15.7*      MPV 9.5       No results for input(s): \"POCGLU\" in the last 72 hours.      Radiology: CT ABDOMEN PELVIS W IV CONTRAST Additional Contrast? None    Result Date: 2/16/2025  EXAMINATION: CT OF THE ABDOMEN AND PELVIS WITH CONTRAST 2/16/2025 5:51 pm TECHNIQUE: CT of the abdomen and pelvis was performed with the administration of intravenous contrast. Multiplanar reformatted images are provided for review. Automated exposure control, iterative reconstruction, and/or weight based adjustment of the mA/kV was utilized to reduce the radiation dose to as low as reasonably achievable. COMPARISON: CT of the abdomen and pelvis with contrast from 12/23/2024. HISTORY: ORDERING SYSTEM PROVIDED HISTORY: hernia lower abd pain TECHNOLOGIST PROVIDED HISTORY: Reason for exam:->hernia lower abd pain Additional Contrast?->None Decision Support Exception - unselect if not a suspected or confirmed emergency medical condition->Emergency Medical Condition (MA) FINDINGS: Lower Chest: There is new moderate tree-in-bud infiltrate scattered throughout the left lower lobe.  There is new mild tree-in-bud infiltrate in the posteromedial right lower lobe.  The findings are that of mild left lower lobe and minimal right lower lobe pneumonia. There is moderate calcification of the mitral and aortic valve annuli.  There is heavy coronary

## 2025-02-18 ENCOUNTER — APPOINTMENT (OUTPATIENT)
Age: 65
DRG: 393 | End: 2025-02-18
Attending: SPECIALIST
Payer: COMMERCIAL

## 2025-02-18 ENCOUNTER — APPOINTMENT (OUTPATIENT)
Age: 65
DRG: 393 | End: 2025-02-18
Payer: COMMERCIAL

## 2025-02-18 ENCOUNTER — APPOINTMENT (OUTPATIENT)
Dept: NUCLEAR MEDICINE | Age: 65
DRG: 393 | End: 2025-02-18
Payer: COMMERCIAL

## 2025-02-18 PROBLEM — E44.0 MODERATE PROTEIN-CALORIE MALNUTRITION: Chronic | Status: ACTIVE | Noted: 2025-02-18

## 2025-02-18 LAB
ALBUMIN SERPL-MCNC: 2.9 G/DL (ref 3.5–5.2)
ALP SERPL-CCNC: 140 U/L (ref 40–129)
ALT SERPL-CCNC: 6 U/L (ref 0–40)
ANION GAP SERPL CALCULATED.3IONS-SCNC: 14 MMOL/L (ref 7–16)
AST SERPL-CCNC: 11 U/L (ref 0–39)
B PARAP IS1001 DNA NPH QL NAA+NON-PROBE: NOT DETECTED
B PERT DNA SPEC QL NAA+PROBE: NOT DETECTED
BASOPHILS # BLD: 0.04 K/UL (ref 0–0.2)
BASOPHILS NFR BLD: 1 % (ref 0–2)
BILIRUB SERPL-MCNC: 0.7 MG/DL (ref 0–1.2)
BUN SERPL-MCNC: 8 MG/DL (ref 6–23)
C PNEUM DNA NPH QL NAA+NON-PROBE: NOT DETECTED
CALCIUM SERPL-MCNC: 9.1 MG/DL (ref 8.6–10.2)
CHLORIDE SERPL-SCNC: 100 MMOL/L (ref 98–107)
CO2 SERPL-SCNC: 20 MMOL/L (ref 22–29)
CREAT SERPL-MCNC: 0.6 MG/DL (ref 0.7–1.2)
ECHO BSA: 1.82 M2
EOSINOPHIL # BLD: 0.25 K/UL (ref 0.05–0.5)
EOSINOPHILS RELATIVE PERCENT: 3 % (ref 0–6)
ERYTHROCYTE [DISTWIDTH] IN BLOOD BY AUTOMATED COUNT: 15.5 % (ref 11.5–15)
FLUAV RNA NPH QL NAA+NON-PROBE: NOT DETECTED
FLUBV RNA NPH QL NAA+NON-PROBE: NOT DETECTED
GFR, ESTIMATED: >90 ML/MIN/1.73M2
GLUCOSE BLD-MCNC: 108 MG/DL (ref 74–99)
GLUCOSE BLD-MCNC: 72 MG/DL (ref 74–99)
GLUCOSE BLD-MCNC: 86 MG/DL (ref 74–99)
GLUCOSE SERPL-MCNC: 76 MG/DL (ref 74–99)
HADV DNA NPH QL NAA+NON-PROBE: NOT DETECTED
HCOV 229E RNA NPH QL NAA+NON-PROBE: NOT DETECTED
HCOV HKU1 RNA NPH QL NAA+NON-PROBE: NOT DETECTED
HCOV NL63 RNA NPH QL NAA+NON-PROBE: NOT DETECTED
HCOV OC43 RNA NPH QL NAA+NON-PROBE: NOT DETECTED
HCT VFR BLD AUTO: 29.1 % (ref 37–54)
HCT VFR BLD AUTO: 30.8 % (ref 37–54)
HGB BLD-MCNC: 9.3 G/DL (ref 12.5–16.5)
HGB BLD-MCNC: 9.7 G/DL (ref 12.5–16.5)
HMPV RNA NPH QL NAA+NON-PROBE: NOT DETECTED
HPIV1 RNA NPH QL NAA+NON-PROBE: NOT DETECTED
HPIV2 RNA NPH QL NAA+NON-PROBE: NOT DETECTED
HPIV3 RNA NPH QL NAA+NON-PROBE: NOT DETECTED
HPIV4 RNA NPH QL NAA+NON-PROBE: NOT DETECTED
IMM GRANULOCYTES # BLD AUTO: 0.08 K/UL (ref 0–0.58)
IMM GRANULOCYTES NFR BLD: 1 % (ref 0–5)
LYMPHOCYTES NFR BLD: 1.47 K/UL (ref 1.5–4)
LYMPHOCYTES RELATIVE PERCENT: 17 % (ref 20–42)
M PNEUMO DNA NPH QL NAA+NON-PROBE: NOT DETECTED
MCH RBC QN AUTO: 28.6 PG (ref 26–35)
MCHC RBC AUTO-ENTMCNC: 32 G/DL (ref 32–34.5)
MCV RBC AUTO: 89.5 FL (ref 80–99.9)
MONOCYTES NFR BLD: 0.57 K/UL (ref 0.1–0.95)
MONOCYTES NFR BLD: 7 % (ref 2–12)
NEUTROPHILS NFR BLD: 72 % (ref 43–80)
NEUTS SEG NFR BLD: 6.23 K/UL (ref 1.8–7.3)
NUC STRESS EJECTION FRACTION: 59 %
PLATELET # BLD AUTO: 320 K/UL (ref 130–450)
PMV BLD AUTO: 9.8 FL (ref 7–12)
POTASSIUM SERPL-SCNC: 3.7 MMOL/L (ref 3.5–5)
PROT SERPL-MCNC: 6.2 G/DL (ref 6.4–8.3)
RBC # BLD AUTO: 3.25 M/UL (ref 3.8–5.8)
RSV RNA NPH QL NAA+NON-PROBE: NOT DETECTED
RV+EV RNA NPH QL NAA+NON-PROBE: NOT DETECTED
SARS-COV-2 RNA NPH QL NAA+NON-PROBE: NOT DETECTED
SODIUM SERPL-SCNC: 134 MMOL/L (ref 132–146)
SPECIMEN DESCRIPTION: NORMAL
STRESS BASELINE DIAS BP: 67 MMHG
STRESS BASELINE HR: 58 BPM
STRESS BASELINE ST DEPRESSION: 0 MM
STRESS BASELINE SYS BP: 132 MMHG
STRESS O2 SAT REST: 100 %
STRESS STAGE 1 BP: NORMAL MMHG
STRESS STAGE 1 COMMENTS: NORMAL
STRESS STAGE 1 DURATION: 1 MIN:SEC
STRESS STAGE 1 HR: 100 BPM
STRESS STAGE RECOVERY 1 BP: NORMAL MMHG
STRESS STAGE RECOVERY 1 COMMENTS: NORMAL
STRESS STAGE RECOVERY 1 DURATION: 1 MIN:SEC
STRESS STAGE RECOVERY 1 HR: 93 BPM
STRESS STAGE RECOVERY 2 BP: NORMAL MMHG
STRESS STAGE RECOVERY 2 COMMENTS: NORMAL
STRESS STAGE RECOVERY 2 DURATION: 1 MIN:SEC
STRESS STAGE RECOVERY 2 HR: 93 BPM
STRESS STAGE RECOVERY 3 BP: NORMAL MMHG
STRESS STAGE RECOVERY 3 COMMENTS: NORMAL
STRESS STAGE RECOVERY 3 DURATION: 1 MIN:SEC
STRESS STAGE RECOVERY 3 HR: 92 BPM
STRESS STAGE RECOVERY 4 BP: NORMAL MMHG
STRESS STAGE RECOVERY 4 COMMENTS: NORMAL
STRESS STAGE RECOVERY 4 DURATION: 1 MIN:SEC
STRESS STAGE RECOVERY 4 HR: 90 BPM
STRESS TARGET HR: 155 BPM
TID: 1.05
WBC OTHER # BLD: 8.6 K/UL (ref 4.5–11.5)

## 2025-02-18 PROCEDURE — 99231 SBSQ HOSP IP/OBS SF/LOW 25: CPT | Performed by: SURGERY

## 2025-02-18 PROCEDURE — 82962 GLUCOSE BLOOD TEST: CPT

## 2025-02-18 PROCEDURE — 6360000002 HC RX W HCPCS: Performed by: FAMILY MEDICINE

## 2025-02-18 PROCEDURE — 2500000003 HC RX 250 WO HCPCS: Performed by: FAMILY MEDICINE

## 2025-02-18 PROCEDURE — 85014 HEMATOCRIT: CPT

## 2025-02-18 PROCEDURE — 3430000000 HC RX DIAGNOSTIC RADIOPHARMACEUTICAL: Performed by: RADIOLOGY

## 2025-02-18 PROCEDURE — 85018 HEMOGLOBIN: CPT

## 2025-02-18 PROCEDURE — 6360000002 HC RX W HCPCS: Performed by: SPECIALIST

## 2025-02-18 PROCEDURE — A9500 TC99M SESTAMIBI: HCPCS | Performed by: RADIOLOGY

## 2025-02-18 PROCEDURE — 85025 COMPLETE CBC W/AUTO DIFF WBC: CPT

## 2025-02-18 PROCEDURE — 6370000000 HC RX 637 (ALT 250 FOR IP): Performed by: INTERNAL MEDICINE

## 2025-02-18 PROCEDURE — 93017 CV STRESS TEST TRACING ONLY: CPT

## 2025-02-18 PROCEDURE — 1200000000 HC SEMI PRIVATE

## 2025-02-18 PROCEDURE — 36415 COLL VENOUS BLD VENIPUNCTURE: CPT

## 2025-02-18 PROCEDURE — 78452 HT MUSCLE IMAGE SPECT MULT: CPT | Performed by: INTERNAL MEDICINE

## 2025-02-18 PROCEDURE — 80053 COMPREHEN METABOLIC PANEL: CPT

## 2025-02-18 PROCEDURE — 93306 TTE W/DOPPLER COMPLETE: CPT

## 2025-02-18 PROCEDURE — 99232 SBSQ HOSP IP/OBS MODERATE 35: CPT | Performed by: INTERNAL MEDICINE

## 2025-02-18 PROCEDURE — 6370000000 HC RX 637 (ALT 250 FOR IP): Performed by: FAMILY MEDICINE

## 2025-02-18 PROCEDURE — 78452 HT MUSCLE IMAGE SPECT MULT: CPT

## 2025-02-18 RX ORDER — REGADENOSON 0.08 MG/ML
0.4 INJECTION, SOLUTION INTRAVENOUS
Status: COMPLETED | OUTPATIENT
Start: 2025-02-18 | End: 2025-02-18

## 2025-02-18 RX ORDER — TETRAKIS(2-METHOXYISOBUTYLISOCYANIDE)COPPER(I) TETRAFLUOROBORATE 1 MG/ML
30 INJECTION, POWDER, LYOPHILIZED, FOR SOLUTION INTRAVENOUS
Status: COMPLETED | OUTPATIENT
Start: 2025-02-18 | End: 2025-02-18

## 2025-02-18 RX ORDER — TETRAKIS(2-METHOXYISOBUTYLISOCYANIDE)COPPER(I) TETRAFLUOROBORATE 1 MG/ML
10 INJECTION, POWDER, LYOPHILIZED, FOR SOLUTION INTRAVENOUS
Status: COMPLETED | OUTPATIENT
Start: 2025-02-18 | End: 2025-02-18

## 2025-02-18 RX ADMIN — Medication 30 MILLICURIE: at 13:34

## 2025-02-18 RX ADMIN — TRAMADOL HYDROCHLORIDE 50 MG: 50 TABLET, COATED ORAL at 06:59

## 2025-02-18 RX ADMIN — CARVEDILOL 3.12 MG: 3.12 TABLET, FILM COATED ORAL at 17:30

## 2025-02-18 RX ADMIN — Medication 10 ML: at 20:10

## 2025-02-18 RX ADMIN — LEVOFLOXACIN 750 MG: 5 INJECTION, SOLUTION INTRAVENOUS at 22:41

## 2025-02-18 RX ADMIN — OXYCODONE HYDROCHLORIDE AND ACETAMINOPHEN 500 MG: 500 TABLET ORAL at 15:14

## 2025-02-18 RX ADMIN — ENOXAPARIN SODIUM 40 MG: 100 INJECTION SUBCUTANEOUS at 15:14

## 2025-02-18 RX ADMIN — Medication 1 G: at 15:15

## 2025-02-18 RX ADMIN — GABAPENTIN 300 MG: 300 CAPSULE ORAL at 20:10

## 2025-02-18 RX ADMIN — SODIUM BICARBONATE 650 MG TABLET 1300 MG: at 20:10

## 2025-02-18 RX ADMIN — Medication 10 MILLICURIE: at 11:18

## 2025-02-18 RX ADMIN — ATORVASTATIN CALCIUM 80 MG: 40 TABLET, FILM COATED ORAL at 20:10

## 2025-02-18 RX ADMIN — TRAMADOL HYDROCHLORIDE 50 MG: 50 TABLET, COATED ORAL at 20:09

## 2025-02-18 RX ADMIN — TAMSULOSIN HYDROCHLORIDE 0.4 MG: 0.4 CAPSULE ORAL at 20:10

## 2025-02-18 RX ADMIN — EMPAGLIFLOZIN 10 MG: 10 TABLET, FILM COATED ORAL at 15:14

## 2025-02-18 RX ADMIN — FOLIC ACID 1000 MCG: 1 TABLET ORAL at 15:14

## 2025-02-18 RX ADMIN — Medication 10 ML: at 09:09

## 2025-02-18 RX ADMIN — PANTOPRAZOLE SODIUM 40 MG: 40 TABLET, DELAYED RELEASE ORAL at 15:15

## 2025-02-18 RX ADMIN — MORPHINE SULFATE 2 MG: 2 INJECTION, SOLUTION INTRAMUSCULAR; INTRAVENOUS at 09:09

## 2025-02-18 RX ADMIN — SODIUM BICARBONATE 650 MG TABLET 1300 MG: at 15:14

## 2025-02-18 RX ADMIN — LOSARTAN POTASSIUM 100 MG: 100 TABLET, FILM COATED ORAL at 15:15

## 2025-02-18 RX ADMIN — REGADENOSON 0.4 MG: 0.08 INJECTION, SOLUTION INTRAVENOUS at 13:44

## 2025-02-18 RX ADMIN — TRAMADOL HYDROCHLORIDE 50 MG: 50 TABLET, COATED ORAL at 00:37

## 2025-02-18 RX ADMIN — FERROUS SULFATE TAB 325 MG (65 MG ELEMENTAL FE) 325 MG: 325 (65 FE) TAB at 15:14

## 2025-02-18 RX ADMIN — TRAMADOL HYDROCHLORIDE 50 MG: 50 TABLET, COATED ORAL at 13:53

## 2025-02-18 ASSESSMENT — PAIN SCALES - WONG BAKER: WONGBAKER_NUMERICALRESPONSE: NO HURT

## 2025-02-18 ASSESSMENT — PAIN DESCRIPTION - LOCATION
LOCATION: BACK
LOCATION: ABDOMEN;BACK
LOCATION: SCROTUM

## 2025-02-18 ASSESSMENT — PAIN DESCRIPTION - ORIENTATION
ORIENTATION: MID;RIGHT
ORIENTATION: RIGHT
ORIENTATION: MID
ORIENTATION: RIGHT
ORIENTATION: RIGHT

## 2025-02-18 ASSESSMENT — PAIN SCALES - GENERAL
PAINLEVEL_OUTOF10: 5
PAINLEVEL_OUTOF10: 7
PAINLEVEL_OUTOF10: 9
PAINLEVEL_OUTOF10: 6
PAINLEVEL_OUTOF10: 8
PAINLEVEL_OUTOF10: 8

## 2025-02-18 ASSESSMENT — PAIN DESCRIPTION - DESCRIPTORS
DESCRIPTORS: ACHING;CRAMPING;DISCOMFORT
DESCRIPTORS: ACHING;DISCOMFORT;SORE
DESCRIPTORS: ACHING;DISCOMFORT;SORE
DESCRIPTORS: ACHING;SHARP
DESCRIPTORS: ACHING;SHARP

## 2025-02-18 NOTE — DISCHARGE INSTR - COC
Continuity of Care Form    Patient Name: Jerome Steel   :  1960  MRN:  [id number]    Admit date:  2025  Discharge date:  ***    Code Status Order: Full Code   Advance Directives:   Advance Care Flowsheet Documentation             Admitting Physician:  Giovanna Apodaca DO  PCP: Genet Kraus DO    Discharging Nurse: ***  Discharging Hospital Unit/Room#: 0338/0338-02  Discharging Unit Phone Number: ***    Emergency Contact:   Extended Emergency Contact Information  Primary Emergency Contact: Yimi Steel  Home Phone: [phone]  Mobile Phone: [phone]  Relation: Brother/Sister   needed? No  Secondary Emergency Contact: lyn julianian  Home Phone: [phone]  Mobile Phone: [phone]  Relation: Other  Preferred language: English   needed? No    Past Surgical History:  Past Surgical History:   Procedure Laterality Date    CARPAL TUNNEL RELEASE  10/01/2016    Dr. Chang    FINGER AMPUTATION      FOOT DEBRIDEMENT Left 2021    LEFT FOOT DEBRIDEMENT WITH BONE BIOPSY, WOUND VAC APPLICATION performed by Rober Harrell DPM at [facility] OR    FOOT DEBRIDEMENT Left 2022    LEFT FOOT DEBRIDEMENT INCISION AND DRAINAGE performed by Rober Harrell DPM at [facility] OR    FOOT DEBRIDEMENT Left 2022    LEFT FOOT PARTIAL CALCANECTOMY AND DEBRIDEMENT performed by Rober Harrell DPM at [facility] OR    KNEE ARTHROSCOPY      TONSILLECTOMY  1969    TRANSESOPHAGEAL ECHOCARDIOGRAM N/A 2021    TRANSESOPHAGEAL ECHOCARDIOGRAM WITH BUBBLE STUDY performed by Ana Nielsen MD at [facility] ENDOSCOPY    UPPER GASTROINTESTINAL ENDOSCOPY N/A 2021    EGD BIOPSY performed by Oscar Stephens DO at [facility] ENDOSCOPY       Immunization History:   Immunization History   Administered Date(s) Administered    COVID-19, J&J, (age 18y+), IM, 0.5 mL 2021    TDaP, ADACEL (age 10y-64y), BOOSTRIX (age 10y+), IM, 0.5mL 06/15/2024       Active Problems:  Patient Active Problem List   Diagnosis Code    Type 2

## 2025-02-18 NOTE — CARE COORDINATION
CM note: attempted to meet with patient several times today for transition of care planning but he was out of the room for testing.  Pt is from [facility] for rehab and Cm anticipates that he will return there upon discharge.  Reached out to liaison and patient was there skilled and will NEED PRE-CERT to return.  Pt is here for SBO and may need hernia repair, however he needs cardiac clearance before proceeding with any other plans.  Pt also being seen by podiatry for right heel wound

## 2025-02-18 NOTE — CONSULTS
Department of Podiatry   Consult Note        Reason for Consult:  Heel Wound    CHIEF COMPLAINT: Right heel Wound    HISTORY OF PRESENT ILLNESS:                The patient is a 64 y.o. male with significant past medical history of previous calcanectomy to the left heel, type 2 diabetes mellitus without complication, hypertension, PAD, hyperlipidemia who reports as consult to podiatry service for right heel wound.  Patient states that he previously had left heel wound years ago and had to have a calcanectomy performed by Dr. Harrell.  Patient states that he no longer has a wound at the site however does admit to a mild loss of balance to the left side and lower extremity.  Patient is admitted for small bowel obstruction at this time and is frustrated with all that is going on.  Patient notes that he was previously in a nursing home and believes that is where he had obtained this wound as he was a bedbound for a long period of time.  Patient denies any V/D/C/SOB/CP and has no other pedal complaints at this time.     Past Medical History:        Diagnosis Date    Arthritis     Bilateral carpal tunnel syndrome 2016    Cerebral artery occlusion with cerebral infarction ([facility])     Chronic back pain     Coronary artery disease involving native coronary artery of native heart without angina pectoris 09/16/2022    CVA (cerebral vascular accident) ([facility]) 11/2015, 2017    Diabetes mellitus ([facility])     Type 2    Diabetic foot ulcer with osteomyelitis ([facility]) 12/21/2021    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 2 diabetes mellitus, with necrosis of bone ([facility]) 12/21/2021    Hemiparesis affecting left side as late effect of cerebrovascular accident ([facility])     LEFT SIDE NON DOMINANT FOLLOWING STROKE    Hyperlipidemia     Hypertension     Moderate aortic regurgitation     Peripheral vascular angioplasty status 12/21/2021    PFO (patent foramen ovale)     Ulcer of left heel, with necrosis of bone ([facility]) 12/27/2021    Ulcer of left 
GENERAL SURGERY  CONSULT NOTE  2/17/2025    Physician Consulted: Dr. Karimi  Reason for Consult: R inguinal hernia  Referring Physician: Dr. Cristina    HPI  Jerome Steel is a 64 y.o. male who presents for evaluation of groin pain. He states he has had a right inguinal hernia for a long time and intermittently it causes him pain. He was seen for this in December of last year and was told to follow up as an outpatient to discuss operative repair but he never did. The pain this time began yesterday. No n/v. Passing flatus but has not had a BM in 5-6 days.     Has extensive cardiac history with multivessel CAD. He has been referred to CT surgery for this at  multiple times by his cardiologist and refuses each time. He is on plavix for this. He is supposed to have a TURP at some point soon for history of prostatic abscess but has not yet had a f/u appointment with cardiology for clearance. He has been holding his plavix for the last 2 weeks thinking he was going to get his procedure soon but has nothing scheduled and they will not do it without first getting cardiology to sign off.       Past Medical History:   Diagnosis Date    Arthritis     Bilateral carpal tunnel syndrome 2016    Cerebral artery occlusion with cerebral infarction ([facility])     Chronic back pain     Coronary artery disease involving native coronary artery of native heart without angina pectoris 09/16/2022    CVA (cerebral vascular accident) ([facility]) 11/2015, 2017    Diabetes mellitus ([facility])     Type 2    Diabetic foot ulcer with osteomyelitis ([facility]) 12/21/2021    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 2 diabetes mellitus, with necrosis of bone ([facility]) 12/21/2021    Hemiparesis affecting left side as late effect of cerebrovascular accident ([facility])     LEFT SIDE NON DOMINANT FOLLOWING STROKE    Hyperlipidemia     Hypertension     Moderate aortic regurgitation     Peripheral vascular angioplasty status 12/21/2021    PFO (patent foramen ovale)     Ulcer of 
history.    Review Of Systems:   CONSTITUTIONAL: Generalized body weakness.  No fever or chills.  HEENT: No nosebleed.  No double vision.  No stridor.  No hoarseness of the voice.  No hearing impairment.  RESPIRATORY:  shortness of breath.  No cough.  CARDIOVASCULAR: No chest pain.  No palpitation.  GASTROINTESTINAL:  abdominal pain.  No nausea.  No vomiting.  GENITOURINARY: No dysuria or frequency.  No bladder or kidney tumor.  HEMATOLOGIC/LYMPHATIC: No bleeding disorder.  No adenopathy.  ENDOCRINE: No polyuria or polydipsia.  MUSCULOSKELETAL: Arthritis pain.  NEUROLOGICAL: No history of seizure or stroke.  No syncope.  BEHAVIOR/PSYCH: No depression or suicidal ideation.    Physical Exam:    General Appearance:  Alert, cooperative, no distress, appears stated age  Head:  Normocephalic, without obvious abnormality, atraumatic  HEENT: Fairly unremarkable findings.    Neck: Supple, no JVD  Lungs: Mildly diminished breath sounds in the bases, no wheezing  Chest Wall: No tenderness or deformity  Heart:  Regular rate and rhythm, S1, S2 normal, 1/6 systolic murmur heard best at the left sternal border, no rubs.  Abdomen: Mild tenderness in the right lower quadrant.  No rebound or guarding  Extremities:  no cyanosis or edema  Skin: Skin color, texture, turgor normal, no rashes or lesions  Neurologic: Slight weakness in the left arm and left leg      Vitals:    02/17/25 1905 02/17/25 2037 02/18/25 0107 02/18/25 0358   BP:  118/69  109/64   Pulse:  69  74   Resp: 18  18 18   Temp:    98.1 °F (36.7 °C)   TempSrc:    Oral   SpO2:  95%  94%   Weight:       Height:           EKG: Sinus rhythm with heart rate of 67 and minute.  Small Q-wave in lead III and aVF.  Nonspecific ST-T wave changes    LABS:  BMP:    Lab Results   Component Value Date/Time     02/18/2025 05:30 AM    K 3.7 02/18/2025 05:30 AM    K 3.6 09/14/2022 06:34 AM     02/18/2025 05:30 AM    CO2 20 02/18/2025 05:30 AM    BUN 8 02/18/2025 05:30 AM

## 2025-02-19 ENCOUNTER — APPOINTMENT (OUTPATIENT)
Dept: INTERVENTIONAL RADIOLOGY/VASCULAR | Age: 65
DRG: 393 | End: 2025-02-19
Payer: COMMERCIAL

## 2025-02-19 LAB
ALBUMIN SERPL-MCNC: 2.9 G/DL (ref 3.5–5.2)
ALP SERPL-CCNC: 142 U/L (ref 40–129)
ALT SERPL-CCNC: 6 U/L (ref 0–40)
ANION GAP SERPL CALCULATED.3IONS-SCNC: 13 MMOL/L (ref 7–16)
AST SERPL-CCNC: 10 U/L (ref 0–39)
BASOPHILS # BLD: 0.06 K/UL (ref 0–0.2)
BASOPHILS NFR BLD: 1 % (ref 0–2)
BILIRUB SERPL-MCNC: 0.6 MG/DL (ref 0–1.2)
BUN SERPL-MCNC: 10 MG/DL (ref 6–23)
CALCIUM SERPL-MCNC: 8.8 MG/DL (ref 8.6–10.2)
CHLORIDE SERPL-SCNC: 101 MMOL/L (ref 98–107)
CO2 SERPL-SCNC: 21 MMOL/L (ref 22–29)
CREAT SERPL-MCNC: 0.7 MG/DL (ref 0.7–1.2)
ECHO AO ASC DIAM: 3.5 CM
ECHO AO ASCENDING AORTA INDEX: 1.9 CM/M2
ECHO AR MAX VEL PISA: 3.5 M/S
ECHO AV AREA PEAK VELOCITY: 1.4 CM2
ECHO AV AREA PEAK VELOCITY: 1.4 CM2
ECHO AV AREA PEAK VELOCITY: 1.5 CM2
ECHO AV AREA PEAK VELOCITY: 1.5 CM2
ECHO AV AREA PLAN/BSA: 0.49 CM2/M2
ECHO AV AREA PLAN: 0.9 CM2
ECHO AV AREA VTI: 1.5 CM2
ECHO AV AREA/BSA VTI: 0.8 CM2/M2
ECHO AV CUSP MM: 1.1 CM
ECHO AV MEAN GRADIENT: 14 MMHG
ECHO AV MEAN VELOCITY: 1.7 M/S
ECHO AV PEAK GRADIENT: 25 MMHG
ECHO AV PEAK GRADIENT: 25 MMHG
ECHO AV PEAK VELOCITY: 2.3 M/S
ECHO AV PEAK VELOCITY: 2.5 M/S
ECHO AV REGURGITANT PHT: 876.8 MS
ECHO AV VTI: 66.8 CM
ECHO BSA: 1.82 M2
ECHO EST RA PRESSURE: 3 MMHG
ECHO LA DIAMETER INDEX: 2.34 CM/M2
ECHO LA DIAMETER: 4.3 CM
ECHO LA VOL A-L A2C: 105 ML (ref 18–58)
ECHO LA VOL A-L A4C: 86 ML (ref 18–58)
ECHO LA VOL BP: 95 ML (ref 18–58)
ECHO LA VOL MOD A2C: 99 ML (ref 18–58)
ECHO LA VOL MOD A4C: 82 ML (ref 18–58)
ECHO LA VOL/BSA BIPLANE: 52 ML/M2 (ref 16–34)
ECHO LA VOLUME AREA LENGTH: 101 ML
ECHO LA VOLUME INDEX A-L A2C: 57 ML/M2 (ref 16–34)
ECHO LA VOLUME INDEX A-L A4C: 47 ML/M2 (ref 16–34)
ECHO LA VOLUME INDEX AREA LENGTH: 55 ML/M2 (ref 16–34)
ECHO LA VOLUME INDEX MOD A2C: 54 ML/M2 (ref 16–34)
ECHO LA VOLUME INDEX MOD A4C: 45 ML/M2 (ref 16–34)
ECHO LV EF PHYSICIAN: 55 %
ECHO LV FRACTIONAL SHORTENING: 36 % (ref 28–44)
ECHO LV INTERNAL DIMENSION DIASTOLE INDEX: 3.15 CM/M2
ECHO LV INTERNAL DIMENSION DIASTOLIC: 5.8 CM (ref 4.2–5.9)
ECHO LV INTERNAL DIMENSION SYSTOLIC INDEX: 2.01 CM/M2
ECHO LV INTERNAL DIMENSION SYSTOLIC: 3.7 CM
ECHO LV IVSD: 1.3 CM (ref 0.6–1)
ECHO LV IVSS: 1.5 CM
ECHO LV MASS 2D: 331.4 G (ref 88–224)
ECHO LV MASS INDEX 2D: 180.1 G/M2 (ref 49–115)
ECHO LV POSTERIOR WALL DIASTOLIC: 1.3 CM (ref 0.6–1)
ECHO LV POSTERIOR WALL SYSTOLIC: 1.5 CM
ECHO LV RELATIVE WALL THICKNESS RATIO: 0.45
ECHO LVOT AREA: 3.5 CM2
ECHO LVOT AV VTI INDEX: 0.42
ECHO LVOT DIAM: 2.1 CM
ECHO LVOT MEAN GRADIENT: 2 MMHG
ECHO LVOT PEAK GRADIENT: 4 MMHG
ECHO LVOT PEAK GRADIENT: 4 MMHG
ECHO LVOT PEAK VELOCITY: 0.9 M/S
ECHO LVOT PEAK VELOCITY: 1 M/S
ECHO LVOT STROKE VOLUME INDEX: 53.1 ML/M2
ECHO LVOT SV: 97.6 ML
ECHO LVOT VTI: 28.2 CM
ECHO MV "A" WAVE DURATION: 171.3 MSEC
ECHO MV A VELOCITY: 1.1 M/S
ECHO MV AREA PHT: 1.7 CM2
ECHO MV AREA VTI: 1.9 CM2
ECHO MV E DECELERATION TIME (DT): 281.7 MS
ECHO MV E VELOCITY: 1.13 M/S
ECHO MV E/A RATIO: 1.03
ECHO MV LVOT VTI INDEX: 1.86
ECHO MV MAX VELOCITY: 1.1 M/S
ECHO MV MEAN GRADIENT: 2 MMHG
ECHO MV MEAN VELOCITY: 0.7 M/S
ECHO MV PEAK GRADIENT: 5 MMHG
ECHO MV PRESSURE HALF TIME (PHT): 131.9 MS
ECHO MV VTI: 52.5 CM
ECHO PV MAX VELOCITY: 1.1 M/S
ECHO PV MEAN GRADIENT: 3 MMHG
ECHO PV MEAN VELOCITY: 0.8 M/S
ECHO PV PEAK GRADIENT: 5 MMHG
ECHO PV VTI: 25.4 CM
ECHO PVEIN A DURATION: 171.3 MS
ECHO PVEIN A VELOCITY: 0.3 M/S
ECHO PVEIN PEAK D VELOCITY: 0.4 M/S
ECHO PVEIN PEAK S VELOCITY: 0.9 M/S
ECHO PVEIN S/D RATIO: 2.3
ECHO RIGHT VENTRICULAR SYSTOLIC PRESSURE (RVSP): 27 MMHG
ECHO RV INTERNAL DIMENSION: 2.6 CM
ECHO RV LONGITUDINAL DIMENSION: 7.7 CM
ECHO RV MID DIMENSION: 3.6 CM
ECHO RVOT MEAN GRADIENT: 1 MMHG
ECHO RVOT PEAK GRADIENT: 2 MMHG
ECHO RVOT PEAK VELOCITY: 0.7 M/S
ECHO RVOT VTI: 16.5 CM
ECHO TV REGURGITANT MAX VELOCITY: 2.44 M/S
ECHO TV REGURGITANT PEAK GRADIENT: 24 MMHG
EOSINOPHIL # BLD: 0.32 K/UL (ref 0.05–0.5)
EOSINOPHILS RELATIVE PERCENT: 3 % (ref 0–6)
ERYTHROCYTE [DISTWIDTH] IN BLOOD BY AUTOMATED COUNT: 15.5 % (ref 11.5–15)
GFR, ESTIMATED: >90 ML/MIN/1.73M2
GLUCOSE BLD-MCNC: 175 MG/DL (ref 74–99)
GLUCOSE BLD-MCNC: 175 MG/DL (ref 74–99)
GLUCOSE BLD-MCNC: 75 MG/DL (ref 74–99)
GLUCOSE BLD-MCNC: 76 MG/DL (ref 74–99)
GLUCOSE SERPL-MCNC: 68 MG/DL (ref 74–99)
HCT VFR BLD AUTO: 28.4 % (ref 37–54)
HGB BLD-MCNC: 9.2 G/DL (ref 12.5–16.5)
IMM GRANULOCYTES # BLD AUTO: 0.04 K/UL (ref 0–0.58)
IMM GRANULOCYTES NFR BLD: 0 % (ref 0–5)
LYMPHOCYTES NFR BLD: 1.39 K/UL (ref 1.5–4)
LYMPHOCYTES RELATIVE PERCENT: 15 % (ref 20–42)
MCH RBC QN AUTO: 28.8 PG (ref 26–35)
MCHC RBC AUTO-ENTMCNC: 32.4 G/DL (ref 32–34.5)
MCV RBC AUTO: 88.8 FL (ref 80–99.9)
MONOCYTES NFR BLD: 0.56 K/UL (ref 0.1–0.95)
MONOCYTES NFR BLD: 6 % (ref 2–12)
MYCOPLASMA AB,IGM: NORMAL
NEUTROPHILS NFR BLD: 75 % (ref 43–80)
NEUTS SEG NFR BLD: 7.22 K/UL (ref 1.8–7.3)
PLATELET # BLD AUTO: 299 K/UL (ref 130–450)
PMV BLD AUTO: 9.4 FL (ref 7–12)
POTASSIUM SERPL-SCNC: 3.8 MMOL/L (ref 3.5–5)
PROT SERPL-MCNC: 6.1 G/DL (ref 6.4–8.3)
RBC # BLD AUTO: 3.2 M/UL (ref 3.8–5.8)
SODIUM SERPL-SCNC: 135 MMOL/L (ref 132–146)
WBC OTHER # BLD: 9.6 K/UL (ref 4.5–11.5)

## 2025-02-19 PROCEDURE — 99231 SBSQ HOSP IP/OBS SF/LOW 25: CPT | Performed by: SURGERY

## 2025-02-19 PROCEDURE — 6370000000 HC RX 637 (ALT 250 FOR IP): Performed by: FAMILY MEDICINE

## 2025-02-19 PROCEDURE — 1200000000 HC SEMI PRIVATE

## 2025-02-19 PROCEDURE — 99232 SBSQ HOSP IP/OBS MODERATE 35: CPT | Performed by: INTERNAL MEDICINE

## 2025-02-19 PROCEDURE — 36415 COLL VENOUS BLD VENIPUNCTURE: CPT

## 2025-02-19 PROCEDURE — 6370000000 HC RX 637 (ALT 250 FOR IP): Performed by: INTERNAL MEDICINE

## 2025-02-19 PROCEDURE — 2500000003 HC RX 250 WO HCPCS: Performed by: FAMILY MEDICINE

## 2025-02-19 PROCEDURE — 93923 UPR/LXTR ART STDY 3+ LVLS: CPT

## 2025-02-19 PROCEDURE — 97165 OT EVAL LOW COMPLEX 30 MIN: CPT

## 2025-02-19 PROCEDURE — 85025 COMPLETE CBC W/AUTO DIFF WBC: CPT

## 2025-02-19 PROCEDURE — 80053 COMPREHEN METABOLIC PANEL: CPT

## 2025-02-19 PROCEDURE — 6370000000 HC RX 637 (ALT 250 FOR IP): Performed by: STUDENT IN AN ORGANIZED HEALTH CARE EDUCATION/TRAINING PROGRAM

## 2025-02-19 PROCEDURE — 6360000002 HC RX W HCPCS: Performed by: FAMILY MEDICINE

## 2025-02-19 PROCEDURE — 86738 MYCOPLASMA ANTIBODY: CPT

## 2025-02-19 PROCEDURE — 97535 SELF CARE MNGMENT TRAINING: CPT

## 2025-02-19 PROCEDURE — 82962 GLUCOSE BLOOD TEST: CPT

## 2025-02-19 RX ORDER — LACTULOSE 10 G/15ML
20 SOLUTION ORAL 3 TIMES DAILY
Status: DISPENSED | OUTPATIENT
Start: 2025-02-19 | End: 2025-02-20

## 2025-02-19 RX ORDER — SENNOSIDES A AND B 8.6 MG/1
1 TABLET, FILM COATED ORAL 2 TIMES DAILY
Status: DISCONTINUED | OUTPATIENT
Start: 2025-02-19 | End: 2025-02-23 | Stop reason: HOSPADM

## 2025-02-19 RX ORDER — POLYETHYLENE GLYCOL 3350 17 G/17G
17 POWDER, FOR SOLUTION ORAL DAILY
Status: DISCONTINUED | OUTPATIENT
Start: 2025-02-19 | End: 2025-02-23 | Stop reason: HOSPADM

## 2025-02-19 RX ADMIN — ATORVASTATIN CALCIUM 80 MG: 40 TABLET, FILM COATED ORAL at 19:50

## 2025-02-19 RX ADMIN — ENOXAPARIN SODIUM 40 MG: 100 INJECTION SUBCUTANEOUS at 08:58

## 2025-02-19 RX ADMIN — TRAMADOL HYDROCHLORIDE 50 MG: 50 TABLET, COATED ORAL at 02:42

## 2025-02-19 RX ADMIN — FERROUS SULFATE TAB 325 MG (65 MG ELEMENTAL FE) 325 MG: 325 (65 FE) TAB at 08:59

## 2025-02-19 RX ADMIN — OXYCODONE HYDROCHLORIDE AND ACETAMINOPHEN 500 MG: 500 TABLET ORAL at 08:59

## 2025-02-19 RX ADMIN — POLYETHYLENE GLYCOL 3350 17 G: 17 POWDER, FOR SOLUTION ORAL at 08:58

## 2025-02-19 RX ADMIN — LACTULOSE 20 G: 20 SOLUTION ORAL at 19:51

## 2025-02-19 RX ADMIN — TRAMADOL HYDROCHLORIDE 50 MG: 50 TABLET, COATED ORAL at 16:01

## 2025-02-19 RX ADMIN — SODIUM BICARBONATE 650 MG TABLET 1300 MG: at 19:51

## 2025-02-19 RX ADMIN — GABAPENTIN 300 MG: 300 CAPSULE ORAL at 19:51

## 2025-02-19 RX ADMIN — SENNOSIDES 8.6 MG: 8.6 TABLET, COATED ORAL at 09:00

## 2025-02-19 RX ADMIN — LOSARTAN POTASSIUM 100 MG: 100 TABLET, FILM COATED ORAL at 09:00

## 2025-02-19 RX ADMIN — Medication 10 ML: at 08:58

## 2025-02-19 RX ADMIN — TAMSULOSIN HYDROCHLORIDE 0.4 MG: 0.4 CAPSULE ORAL at 19:51

## 2025-02-19 RX ADMIN — Medication 1 G: at 09:00

## 2025-02-19 RX ADMIN — GABAPENTIN 300 MG: 300 CAPSULE ORAL at 08:59

## 2025-02-19 RX ADMIN — SODIUM BICARBONATE 650 MG TABLET 1300 MG: at 08:59

## 2025-02-19 RX ADMIN — LACTULOSE 20 G: 20 SOLUTION ORAL at 13:07

## 2025-02-19 RX ADMIN — Medication 1 G: at 16:01

## 2025-02-19 RX ADMIN — EMPAGLIFLOZIN 10 MG: 10 TABLET, FILM COATED ORAL at 09:00

## 2025-02-19 RX ADMIN — FOLIC ACID 1000 MCG: 1 TABLET ORAL at 09:00

## 2025-02-19 RX ADMIN — TRAMADOL HYDROCHLORIDE 50 MG: 50 TABLET, COATED ORAL at 08:59

## 2025-02-19 RX ADMIN — Medication 1 G: at 13:07

## 2025-02-19 RX ADMIN — Medication 10 ML: at 21:00

## 2025-02-19 RX ADMIN — LEVOFLOXACIN 750 MG: 5 INJECTION, SOLUTION INTRAVENOUS at 22:34

## 2025-02-19 RX ADMIN — PANTOPRAZOLE SODIUM 40 MG: 40 TABLET, DELAYED RELEASE ORAL at 08:59

## 2025-02-19 RX ADMIN — TRAMADOL HYDROCHLORIDE 50 MG: 50 TABLET, COATED ORAL at 22:06

## 2025-02-19 RX ADMIN — CARVEDILOL 3.12 MG: 3.12 TABLET, FILM COATED ORAL at 16:01

## 2025-02-19 RX ADMIN — CARVEDILOL 3.12 MG: 3.12 TABLET, FILM COATED ORAL at 08:59

## 2025-02-19 RX ADMIN — SENNOSIDES 8.6 MG: 8.6 TABLET, COATED ORAL at 19:51

## 2025-02-19 ASSESSMENT — PAIN SCALES - WONG BAKER: WONGBAKER_NUMERICALRESPONSE: HURTS A LITTLE BIT

## 2025-02-19 ASSESSMENT — PAIN SCALES - GENERAL
PAINLEVEL_OUTOF10: 3
PAINLEVEL_OUTOF10: 7
PAINLEVEL_OUTOF10: 8

## 2025-02-19 ASSESSMENT — PAIN DESCRIPTION - LOCATION
LOCATION: BACK;LEG
LOCATION: BACK;GROIN;LEG
LOCATION: ABDOMEN;LEG
LOCATION: SCROTUM

## 2025-02-19 ASSESSMENT — PAIN DESCRIPTION - ORIENTATION
ORIENTATION: LEFT
ORIENTATION: RIGHT
ORIENTATION: LEFT
ORIENTATION: RIGHT

## 2025-02-19 ASSESSMENT — PAIN DESCRIPTION - DESCRIPTORS
DESCRIPTORS: ACHING
DESCRIPTORS: DISCOMFORT;SORE
DESCRIPTORS: ACHING;DISCOMFORT;TENDER
DESCRIPTORS: ACHING;BURNING

## 2025-02-19 NOTE — CARE COORDINATION
Plan is for patient to return to [facility] in [place].  He was there skilled and will NEED PRECERT to return.  Have called and asked for updated therapy notes so Pena can start precert.  Await auth.  Will need KELLY signed at MD.

## 2025-02-20 LAB
ALBUMIN SERPL-MCNC: 2.9 G/DL (ref 3.5–5.2)
ALP SERPL-CCNC: 171 U/L (ref 40–129)
ALT SERPL-CCNC: 13 U/L (ref 0–40)
ANION GAP SERPL CALCULATED.3IONS-SCNC: 12 MMOL/L (ref 7–16)
AST SERPL-CCNC: 26 U/L (ref 0–39)
BASOPHILS # BLD: 0.04 K/UL (ref 0–0.2)
BASOPHILS NFR BLD: 0 % (ref 0–2)
BILIRUB SERPL-MCNC: 0.6 MG/DL (ref 0–1.2)
BUN SERPL-MCNC: 6 MG/DL (ref 6–23)
CALCIUM SERPL-MCNC: 8.8 MG/DL (ref 8.6–10.2)
CHLORIDE SERPL-SCNC: 102 MMOL/L (ref 98–107)
CO2 SERPL-SCNC: 23 MMOL/L (ref 22–29)
CREAT SERPL-MCNC: 0.6 MG/DL (ref 0.7–1.2)
EOSINOPHIL # BLD: 0.19 K/UL (ref 0.05–0.5)
EOSINOPHILS RELATIVE PERCENT: 2 % (ref 0–6)
ERYTHROCYTE [DISTWIDTH] IN BLOOD BY AUTOMATED COUNT: 15.7 % (ref 11.5–15)
GFR, ESTIMATED: >90 ML/MIN/1.73M2
GLUCOSE BLD-MCNC: 108 MG/DL (ref 74–99)
GLUCOSE BLD-MCNC: 165 MG/DL (ref 74–99)
GLUCOSE BLD-MCNC: 175 MG/DL (ref 74–99)
GLUCOSE BLD-MCNC: 92 MG/DL (ref 74–99)
GLUCOSE SERPL-MCNC: 93 MG/DL (ref 74–99)
HCT VFR BLD AUTO: 28.5 % (ref 37–54)
HGB BLD-MCNC: 9.1 G/DL (ref 12.5–16.5)
IMM GRANULOCYTES # BLD AUTO: 0.06 K/UL (ref 0–0.58)
IMM GRANULOCYTES NFR BLD: 1 % (ref 0–5)
LYMPHOCYTES NFR BLD: 1.11 K/UL (ref 1.5–4)
LYMPHOCYTES RELATIVE PERCENT: 10 % (ref 20–42)
MCH RBC QN AUTO: 28.5 PG (ref 26–35)
MCHC RBC AUTO-ENTMCNC: 31.9 G/DL (ref 32–34.5)
MCV RBC AUTO: 89.3 FL (ref 80–99.9)
MONOCYTES NFR BLD: 0.58 K/UL (ref 0.1–0.95)
MONOCYTES NFR BLD: 5 % (ref 2–12)
NEUTROPHILS NFR BLD: 82 % (ref 43–80)
NEUTS SEG NFR BLD: 9.08 K/UL (ref 1.8–7.3)
PLATELET # BLD AUTO: 264 K/UL (ref 130–450)
PMV BLD AUTO: 9.8 FL (ref 7–12)
POTASSIUM SERPL-SCNC: 3.6 MMOL/L (ref 3.5–5)
PROT SERPL-MCNC: 6.1 G/DL (ref 6.4–8.3)
RBC # BLD AUTO: 3.19 M/UL (ref 3.8–5.8)
SODIUM SERPL-SCNC: 137 MMOL/L (ref 132–146)
WBC OTHER # BLD: 11.1 K/UL (ref 4.5–11.5)

## 2025-02-20 PROCEDURE — 2500000003 HC RX 250 WO HCPCS: Performed by: FAMILY MEDICINE

## 2025-02-20 PROCEDURE — 99232 SBSQ HOSP IP/OBS MODERATE 35: CPT | Performed by: SURGERY

## 2025-02-20 PROCEDURE — 97530 THERAPEUTIC ACTIVITIES: CPT

## 2025-02-20 PROCEDURE — 85025 COMPLETE CBC W/AUTO DIFF WBC: CPT

## 2025-02-20 PROCEDURE — 82962 GLUCOSE BLOOD TEST: CPT

## 2025-02-20 PROCEDURE — 6370000000 HC RX 637 (ALT 250 FOR IP): Performed by: INTERNAL MEDICINE

## 2025-02-20 PROCEDURE — 6370000000 HC RX 637 (ALT 250 FOR IP): Performed by: FAMILY MEDICINE

## 2025-02-20 PROCEDURE — 99232 SBSQ HOSP IP/OBS MODERATE 35: CPT | Performed by: INTERNAL MEDICINE

## 2025-02-20 PROCEDURE — 6360000002 HC RX W HCPCS: Performed by: FAMILY MEDICINE

## 2025-02-20 PROCEDURE — 80053 COMPREHEN METABOLIC PANEL: CPT

## 2025-02-20 PROCEDURE — 6370000000 HC RX 637 (ALT 250 FOR IP): Performed by: SPECIALIST

## 2025-02-20 PROCEDURE — 6370000000 HC RX 637 (ALT 250 FOR IP): Performed by: STUDENT IN AN ORGANIZED HEALTH CARE EDUCATION/TRAINING PROGRAM

## 2025-02-20 PROCEDURE — 1200000000 HC SEMI PRIVATE

## 2025-02-20 PROCEDURE — 36415 COLL VENOUS BLD VENIPUNCTURE: CPT

## 2025-02-20 RX ORDER — ASPIRIN 81 MG/1
81 TABLET, CHEWABLE ORAL DAILY
Status: DISCONTINUED | OUTPATIENT
Start: 2025-02-20 | End: 2025-02-23 | Stop reason: HOSPADM

## 2025-02-20 RX ADMIN — FOLIC ACID 1000 MCG: 1 TABLET ORAL at 08:42

## 2025-02-20 RX ADMIN — Medication 1 G: at 11:49

## 2025-02-20 RX ADMIN — TRAMADOL HYDROCHLORIDE 50 MG: 50 TABLET, COATED ORAL at 10:44

## 2025-02-20 RX ADMIN — TRAMADOL HYDROCHLORIDE 50 MG: 50 TABLET, COATED ORAL at 17:02

## 2025-02-20 RX ADMIN — CARVEDILOL 3.12 MG: 3.12 TABLET, FILM COATED ORAL at 08:42

## 2025-02-20 RX ADMIN — TRAMADOL HYDROCHLORIDE 50 MG: 50 TABLET, COATED ORAL at 22:56

## 2025-02-20 RX ADMIN — Medication 10 ML: at 20:47

## 2025-02-20 RX ADMIN — CARVEDILOL 3.12 MG: 3.12 TABLET, FILM COATED ORAL at 17:02

## 2025-02-20 RX ADMIN — SODIUM BICARBONATE 650 MG TABLET 1300 MG: at 20:45

## 2025-02-20 RX ADMIN — EMPAGLIFLOZIN 10 MG: 10 TABLET, FILM COATED ORAL at 08:42

## 2025-02-20 RX ADMIN — Medication 1 G: at 17:02

## 2025-02-20 RX ADMIN — SENNOSIDES 8.6 MG: 8.6 TABLET, COATED ORAL at 08:42

## 2025-02-20 RX ADMIN — TRAMADOL HYDROCHLORIDE 50 MG: 50 TABLET, COATED ORAL at 04:38

## 2025-02-20 RX ADMIN — ATORVASTATIN CALCIUM 80 MG: 40 TABLET, FILM COATED ORAL at 20:45

## 2025-02-20 RX ADMIN — Medication 10 ML: at 08:43

## 2025-02-20 RX ADMIN — GABAPENTIN 300 MG: 300 CAPSULE ORAL at 08:43

## 2025-02-20 RX ADMIN — LEVOFLOXACIN 750 MG: 5 INJECTION, SOLUTION INTRAVENOUS at 22:54

## 2025-02-20 RX ADMIN — TAMSULOSIN HYDROCHLORIDE 0.4 MG: 0.4 CAPSULE ORAL at 20:45

## 2025-02-20 RX ADMIN — SENNOSIDES 8.6 MG: 8.6 TABLET, COATED ORAL at 20:45

## 2025-02-20 RX ADMIN — ASPIRIN 81 MG: 81 TABLET, CHEWABLE ORAL at 08:43

## 2025-02-20 RX ADMIN — Medication 1 G: at 08:42

## 2025-02-20 RX ADMIN — OXYCODONE HYDROCHLORIDE AND ACETAMINOPHEN 500 MG: 500 TABLET ORAL at 08:42

## 2025-02-20 RX ADMIN — GABAPENTIN 300 MG: 300 CAPSULE ORAL at 20:45

## 2025-02-20 RX ADMIN — LOSARTAN POTASSIUM 100 MG: 100 TABLET, FILM COATED ORAL at 08:42

## 2025-02-20 RX ADMIN — PANTOPRAZOLE SODIUM 40 MG: 40 TABLET, DELAYED RELEASE ORAL at 08:42

## 2025-02-20 RX ADMIN — FERROUS SULFATE TAB 325 MG (65 MG ELEMENTAL FE) 325 MG: 325 (65 FE) TAB at 08:42

## 2025-02-20 RX ADMIN — SODIUM BICARBONATE 650 MG TABLET 1300 MG: at 08:42

## 2025-02-20 ASSESSMENT — PAIN DESCRIPTION - LOCATION
LOCATION: BACK;FOOT
LOCATION: FOOT

## 2025-02-20 ASSESSMENT — PAIN SCALES - GENERAL
PAINLEVEL_OUTOF10: 10
PAINLEVEL_OUTOF10: 0
PAINLEVEL_OUTOF10: 8
PAINLEVEL_OUTOF10: 4

## 2025-02-20 ASSESSMENT — PAIN DESCRIPTION - DESCRIPTORS
DESCRIPTORS: ACHING;DISCOMFORT;SHOOTING
DESCRIPTORS: ACHING;DISCOMFORT;SHARP

## 2025-02-20 ASSESSMENT — PAIN DESCRIPTION - ORIENTATION
ORIENTATION: RIGHT
ORIENTATION: RIGHT

## 2025-02-20 ASSESSMENT — PAIN SCALES - WONG BAKER: WONGBAKER_NUMERICALRESPONSE: HURTS A LITTLE BIT

## 2025-02-20 NOTE — CARE COORDINATION
2-20-Cm note: Precert pending to return to [facility] , pt will need a signed KELLY, and PRECERT. Per notes there is no surgical intervention planned at this time. Electronically signed by Ivana Shepherd RN on 2/20/2025 at 12:59 PM

## 2025-02-21 LAB
GLUCOSE BLD-MCNC: 128 MG/DL (ref 74–99)
GLUCOSE BLD-MCNC: 140 MG/DL (ref 74–99)
GLUCOSE BLD-MCNC: 95 MG/DL (ref 74–99)
INR PPP: 1.4
PROTHROMBIN TIME: 15.5 SEC (ref 9.3–12.4)

## 2025-02-21 PROCEDURE — 6370000000 HC RX 637 (ALT 250 FOR IP): Performed by: INTERNAL MEDICINE

## 2025-02-21 PROCEDURE — 6370000000 HC RX 637 (ALT 250 FOR IP): Performed by: STUDENT IN AN ORGANIZED HEALTH CARE EDUCATION/TRAINING PROGRAM

## 2025-02-21 PROCEDURE — 99232 SBSQ HOSP IP/OBS MODERATE 35: CPT | Performed by: INTERNAL MEDICINE

## 2025-02-21 PROCEDURE — 6360000002 HC RX W HCPCS: Performed by: FAMILY MEDICINE

## 2025-02-21 PROCEDURE — 85610 PROTHROMBIN TIME: CPT

## 2025-02-21 PROCEDURE — 82962 GLUCOSE BLOOD TEST: CPT

## 2025-02-21 PROCEDURE — 6360000004 HC RX CONTRAST MEDICATION: Performed by: SPECIALIST

## 2025-02-21 PROCEDURE — 4A023N7 MEASUREMENT OF CARDIAC SAMPLING AND PRESSURE, LEFT HEART, PERCUTANEOUS APPROACH: ICD-10-PCS | Performed by: SPECIALIST

## 2025-02-21 PROCEDURE — 97530 THERAPEUTIC ACTIVITIES: CPT

## 2025-02-21 PROCEDURE — 6370000000 HC RX 637 (ALT 250 FOR IP): Performed by: FAMILY MEDICINE

## 2025-02-21 PROCEDURE — 2580000003 HC RX 258: Performed by: SPECIALIST

## 2025-02-21 PROCEDURE — 99231 SBSQ HOSP IP/OBS SF/LOW 25: CPT | Performed by: SURGERY

## 2025-02-21 PROCEDURE — 6360000002 HC RX W HCPCS: Performed by: SPECIALIST

## 2025-02-21 PROCEDURE — 97535 SELF CARE MNGMENT TRAINING: CPT

## 2025-02-21 PROCEDURE — 36415 COLL VENOUS BLD VENIPUNCTURE: CPT

## 2025-02-21 PROCEDURE — 2060000000 HC ICU INTERMEDIATE R&B

## 2025-02-21 RX ORDER — SODIUM CHLORIDE 9 MG/ML
INJECTION, SOLUTION INTRAVENOUS CONTINUOUS PRN
Status: COMPLETED | OUTPATIENT
Start: 2025-02-21 | End: 2025-02-21

## 2025-02-21 RX ORDER — FENTANYL CITRATE 50 UG/ML
INJECTION, SOLUTION INTRAMUSCULAR; INTRAVENOUS PRN
Status: DISCONTINUED | OUTPATIENT
Start: 2025-02-21 | End: 2025-02-21 | Stop reason: HOSPADM

## 2025-02-21 RX ORDER — IOPAMIDOL 755 MG/ML
INJECTION, SOLUTION INTRAVASCULAR PRN
Status: DISCONTINUED | OUTPATIENT
Start: 2025-02-21 | End: 2025-02-21 | Stop reason: HOSPADM

## 2025-02-21 RX ORDER — ACETAMINOPHEN 325 MG/1
650 TABLET ORAL EVERY 4 HOURS PRN
Status: DISCONTINUED | OUTPATIENT
Start: 2025-02-21 | End: 2025-02-23 | Stop reason: HOSPADM

## 2025-02-21 RX ORDER — SODIUM CHLORIDE 0.9 % (FLUSH) 0.9 %
5-40 SYRINGE (ML) INJECTION PRN
Status: DISCONTINUED | OUTPATIENT
Start: 2025-02-21 | End: 2025-02-23 | Stop reason: HOSPADM

## 2025-02-21 RX ORDER — SODIUM CHLORIDE 9 MG/ML
INJECTION, SOLUTION INTRAVENOUS PRN
Status: DISCONTINUED | OUTPATIENT
Start: 2025-02-21 | End: 2025-02-23 | Stop reason: HOSPADM

## 2025-02-21 RX ORDER — SODIUM CHLORIDE 0.9 % (FLUSH) 0.9 %
5-40 SYRINGE (ML) INJECTION EVERY 12 HOURS SCHEDULED
Status: DISCONTINUED | OUTPATIENT
Start: 2025-02-21 | End: 2025-02-23 | Stop reason: HOSPADM

## 2025-02-21 RX ADMIN — TAMSULOSIN HYDROCHLORIDE 0.4 MG: 0.4 CAPSULE ORAL at 21:40

## 2025-02-21 RX ADMIN — TRAMADOL HYDROCHLORIDE 50 MG: 50 TABLET, COATED ORAL at 05:53

## 2025-02-21 RX ADMIN — CARVEDILOL 3.12 MG: 3.12 TABLET, FILM COATED ORAL at 18:23

## 2025-02-21 RX ADMIN — SODIUM BICARBONATE 650 MG TABLET 1300 MG: at 21:40

## 2025-02-21 RX ADMIN — LEVOFLOXACIN 750 MG: 5 INJECTION, SOLUTION INTRAVENOUS at 22:41

## 2025-02-21 RX ADMIN — SENNOSIDES 8.6 MG: 8.6 TABLET, COATED ORAL at 21:40

## 2025-02-21 RX ADMIN — ATORVASTATIN CALCIUM 80 MG: 40 TABLET, FILM COATED ORAL at 21:40

## 2025-02-21 RX ADMIN — TRAMADOL HYDROCHLORIDE 50 MG: 50 TABLET, COATED ORAL at 18:20

## 2025-02-21 RX ADMIN — ACETAMINOPHEN 650 MG: 325 TABLET ORAL at 22:36

## 2025-02-21 RX ADMIN — GABAPENTIN 300 MG: 300 CAPSULE ORAL at 21:40

## 2025-02-21 ASSESSMENT — PAIN SCALES - GENERAL
PAINLEVEL_OUTOF10: 0
PAINLEVEL_OUTOF10: 9
PAINLEVEL_OUTOF10: 8
PAINLEVEL_OUTOF10: 4
PAINLEVEL_OUTOF10: 8

## 2025-02-21 ASSESSMENT — PAIN DESCRIPTION - LOCATION
LOCATION: LEG
LOCATION: FOOT
LOCATION: GENERALIZED

## 2025-02-21 ASSESSMENT — PAIN DESCRIPTION - DESCRIPTORS
DESCRIPTORS: ACHING;DISCOMFORT
DESCRIPTORS: ACHING;DISCOMFORT;SHARP

## 2025-02-21 ASSESSMENT — PAIN DESCRIPTION - ORIENTATION: ORIENTATION: RIGHT

## 2025-02-21 ASSESSMENT — PAIN - FUNCTIONAL ASSESSMENT: PAIN_FUNCTIONAL_ASSESSMENT: ACTIVITIES ARE NOT PREVENTED

## 2025-02-21 NOTE — CARE COORDINATION
2-21-Cm note: received a call from [facility], pt has PRECERT to return to [facility], precert is good 2-21 thru 2-27. Pt is to have a heart cath prior to hernia surgery. Pt will need a signed KELLY. Electronically signed by Ivana Shepherd RN on 2/21/2025 at 1:48 PM

## 2025-02-22 LAB
GLUCOSE BLD-MCNC: 148 MG/DL (ref 74–99)
GLUCOSE BLD-MCNC: 172 MG/DL (ref 74–99)
GLUCOSE BLD-MCNC: 267 MG/DL (ref 74–99)
GLUCOSE BLD-MCNC: 97 MG/DL (ref 74–99)

## 2025-02-22 PROCEDURE — 6370000000 HC RX 637 (ALT 250 FOR IP): Performed by: FAMILY MEDICINE

## 2025-02-22 PROCEDURE — 2500000003 HC RX 250 WO HCPCS: Performed by: FAMILY MEDICINE

## 2025-02-22 PROCEDURE — 6370000000 HC RX 637 (ALT 250 FOR IP): Performed by: STUDENT IN AN ORGANIZED HEALTH CARE EDUCATION/TRAINING PROGRAM

## 2025-02-22 PROCEDURE — 6370000000 HC RX 637 (ALT 250 FOR IP): Performed by: SPECIALIST

## 2025-02-22 PROCEDURE — 82962 GLUCOSE BLOOD TEST: CPT

## 2025-02-22 PROCEDURE — 6360000002 HC RX W HCPCS: Performed by: FAMILY MEDICINE

## 2025-02-22 PROCEDURE — 99232 SBSQ HOSP IP/OBS MODERATE 35: CPT | Performed by: INTERNAL MEDICINE

## 2025-02-22 PROCEDURE — 2500000003 HC RX 250 WO HCPCS: Performed by: SPECIALIST

## 2025-02-22 PROCEDURE — 2060000000 HC ICU INTERMEDIATE R&B

## 2025-02-22 PROCEDURE — 6370000000 HC RX 637 (ALT 250 FOR IP): Performed by: INTERNAL MEDICINE

## 2025-02-22 RX ADMIN — ATORVASTATIN CALCIUM 80 MG: 40 TABLET, FILM COATED ORAL at 20:12

## 2025-02-22 RX ADMIN — GABAPENTIN 300 MG: 300 CAPSULE ORAL at 20:12

## 2025-02-22 RX ADMIN — Medication 1 G: at 17:10

## 2025-02-22 RX ADMIN — GABAPENTIN 300 MG: 300 CAPSULE ORAL at 09:33

## 2025-02-22 RX ADMIN — TAMSULOSIN HYDROCHLORIDE 0.4 MG: 0.4 CAPSULE ORAL at 22:24

## 2025-02-22 RX ADMIN — SODIUM BICARBONATE 650 MG TABLET 1300 MG: at 20:12

## 2025-02-22 RX ADMIN — ASPIRIN 81 MG: 81 TABLET, CHEWABLE ORAL at 09:33

## 2025-02-22 RX ADMIN — EMPAGLIFLOZIN 10 MG: 10 TABLET, FILM COATED ORAL at 09:33

## 2025-02-22 RX ADMIN — LOSARTAN POTASSIUM 100 MG: 100 TABLET, FILM COATED ORAL at 09:33

## 2025-02-22 RX ADMIN — TRAMADOL HYDROCHLORIDE 50 MG: 50 TABLET, COATED ORAL at 20:12

## 2025-02-22 RX ADMIN — SODIUM BICARBONATE 650 MG TABLET 1300 MG: at 09:33

## 2025-02-22 RX ADMIN — TRAMADOL HYDROCHLORIDE 50 MG: 50 TABLET, COATED ORAL at 14:00

## 2025-02-22 RX ADMIN — ENOXAPARIN SODIUM 40 MG: 100 INJECTION SUBCUTANEOUS at 09:35

## 2025-02-22 RX ADMIN — OXYCODONE HYDROCHLORIDE AND ACETAMINOPHEN 500 MG: 500 TABLET ORAL at 09:33

## 2025-02-22 RX ADMIN — Medication 10 ML: at 09:35

## 2025-02-22 RX ADMIN — Medication 1 G: at 09:33

## 2025-02-22 RX ADMIN — Medication 10 ML: at 02:37

## 2025-02-22 RX ADMIN — SENNOSIDES 8.6 MG: 8.6 TABLET, COATED ORAL at 09:33

## 2025-02-22 RX ADMIN — Medication 10 ML: at 02:38

## 2025-02-22 RX ADMIN — LEVOFLOXACIN 750 MG: 5 INJECTION, SOLUTION INTRAVENOUS at 22:24

## 2025-02-22 RX ADMIN — PANTOPRAZOLE SODIUM 40 MG: 40 TABLET, DELAYED RELEASE ORAL at 09:33

## 2025-02-22 RX ADMIN — CARVEDILOL 3.12 MG: 3.12 TABLET, FILM COATED ORAL at 17:10

## 2025-02-22 RX ADMIN — TRAMADOL HYDROCHLORIDE 50 MG: 50 TABLET, COATED ORAL at 07:48

## 2025-02-22 RX ADMIN — TRAMADOL HYDROCHLORIDE 50 MG: 50 TABLET, COATED ORAL at 00:10

## 2025-02-22 RX ADMIN — Medication 10 ML: at 20:14

## 2025-02-22 RX ADMIN — FERROUS SULFATE TAB 325 MG (65 MG ELEMENTAL FE) 325 MG: 325 (65 FE) TAB at 09:33

## 2025-02-22 RX ADMIN — FOLIC ACID 1000 MCG: 1 TABLET ORAL at 09:33

## 2025-02-22 RX ADMIN — Medication 1 G: at 13:19

## 2025-02-22 RX ADMIN — Medication 10 ML: at 20:16

## 2025-02-22 ASSESSMENT — PAIN DESCRIPTION - LOCATION
LOCATION: FOOT
LOCATION: BACK;FOOT
LOCATION: FOOT
LOCATION: FOOT

## 2025-02-22 ASSESSMENT — PAIN SCALES - GENERAL
PAINLEVEL_OUTOF10: 9
PAINLEVEL_OUTOF10: 6
PAINLEVEL_OUTOF10: 3
PAINLEVEL_OUTOF10: 7
PAINLEVEL_OUTOF10: 3
PAINLEVEL_OUTOF10: 7
PAINLEVEL_OUTOF10: 8

## 2025-02-22 ASSESSMENT — PAIN DESCRIPTION - ORIENTATION: ORIENTATION: RIGHT

## 2025-02-22 ASSESSMENT — PAIN DESCRIPTION - DESCRIPTORS
DESCRIPTORS: DULL
DESCRIPTORS: ACHING;BURNING

## 2025-02-23 ENCOUNTER — HOSPITAL ENCOUNTER (INPATIENT)
Age: 65
LOS: 5 days | Discharge: NURSING, REHAB OR LONG-TERM CARE | End: 2025-02-28
Attending: INTERNAL MEDICINE | Admitting: INTERNAL MEDICINE
Payer: COMMERCIAL

## 2025-02-23 VITALS
OXYGEN SATURATION: 95 % | DIASTOLIC BLOOD PRESSURE: 59 MMHG | HEART RATE: 61 BPM | RESPIRATION RATE: 18 BRPM | WEIGHT: 148 LBS | TEMPERATURE: 97.7 F | HEIGHT: 70 IN | BODY MASS INDEX: 21.19 KG/M2 | SYSTOLIC BLOOD PRESSURE: 117 MMHG

## 2025-02-23 DIAGNOSIS — I73.9 PERIPHERAL VASCULAR DISEASE, UNSPECIFIED: ICD-10-CM

## 2025-02-23 DIAGNOSIS — I25.10 CAD IN NATIVE ARTERY: ICD-10-CM

## 2025-02-23 DIAGNOSIS — I34.0 MITRAL VALVE INSUFFICIENCY, UNSPECIFIED ETIOLOGY: ICD-10-CM

## 2025-02-23 DIAGNOSIS — I35.0 AORTIC VALVE STENOSIS, ETIOLOGY OF CARDIAC VALVE DISEASE UNSPECIFIED: ICD-10-CM

## 2025-02-23 DIAGNOSIS — Z01.818 PRE-OP EVALUATION: Primary | ICD-10-CM

## 2025-02-23 LAB
ALBUMIN SERPL-MCNC: 2.9 G/DL (ref 3.5–5.2)
ALP SERPL-CCNC: 148 U/L (ref 40–129)
ALT SERPL-CCNC: 9 U/L (ref 0–40)
ANION GAP SERPL CALCULATED.3IONS-SCNC: 11 MMOL/L (ref 7–16)
AST SERPL-CCNC: 18 U/L (ref 0–39)
BASOPHILS # BLD: 0.03 K/UL (ref 0–0.2)
BASOPHILS NFR BLD: 0 % (ref 0–2)
BILIRUB SERPL-MCNC: 0.5 MG/DL (ref 0–1.2)
BUN SERPL-MCNC: 8 MG/DL (ref 6–23)
CALCIUM SERPL-MCNC: 8.7 MG/DL (ref 8.6–10.2)
CHLORIDE SERPL-SCNC: 102 MMOL/L (ref 98–107)
CO2 SERPL-SCNC: 22 MMOL/L (ref 22–29)
CREAT SERPL-MCNC: 0.8 MG/DL (ref 0.7–1.2)
EOSINOPHIL # BLD: 0.25 K/UL (ref 0.05–0.5)
EOSINOPHILS RELATIVE PERCENT: 2 % (ref 0–6)
ERYTHROCYTE [DISTWIDTH] IN BLOOD BY AUTOMATED COUNT: 16 % (ref 11.5–15)
GFR, ESTIMATED: >90 ML/MIN/1.73M2
GLUCOSE BLD-MCNC: 132 MG/DL (ref 74–99)
GLUCOSE BLD-MCNC: 152 MG/DL (ref 74–99)
GLUCOSE BLD-MCNC: 161 MG/DL (ref 74–99)
GLUCOSE SERPL-MCNC: 93 MG/DL (ref 74–99)
HCT VFR BLD AUTO: 29.7 % (ref 37–54)
HGB BLD-MCNC: 9.6 G/DL (ref 12.5–16.5)
IMM GRANULOCYTES # BLD AUTO: 0.05 K/UL (ref 0–0.58)
IMM GRANULOCYTES NFR BLD: 1 % (ref 0–5)
LYMPHOCYTES NFR BLD: 1.24 K/UL (ref 1.5–4)
LYMPHOCYTES RELATIVE PERCENT: 12 % (ref 20–42)
MCH RBC QN AUTO: 29.1 PG (ref 26–35)
MCHC RBC AUTO-ENTMCNC: 32.3 G/DL (ref 32–34.5)
MCV RBC AUTO: 90 FL (ref 80–99.9)
MONOCYTES NFR BLD: 0.54 K/UL (ref 0.1–0.95)
MONOCYTES NFR BLD: 5 % (ref 2–12)
NEUTROPHILS NFR BLD: 79 % (ref 43–80)
NEUTS SEG NFR BLD: 8.12 K/UL (ref 1.8–7.3)
PLATELET # BLD AUTO: 247 K/UL (ref 130–450)
PMV BLD AUTO: 10 FL (ref 7–12)
POTASSIUM SERPL-SCNC: 3.8 MMOL/L (ref 3.5–5)
PROT SERPL-MCNC: 6.1 G/DL (ref 6.4–8.3)
RBC # BLD AUTO: 3.3 M/UL (ref 3.8–5.8)
SODIUM SERPL-SCNC: 135 MMOL/L (ref 132–146)
WBC OTHER # BLD: 10.2 K/UL (ref 4.5–11.5)

## 2025-02-23 PROCEDURE — 2500000003 HC RX 250 WO HCPCS: Performed by: INTERNAL MEDICINE

## 2025-02-23 PROCEDURE — 6370000000 HC RX 637 (ALT 250 FOR IP): Performed by: FAMILY MEDICINE

## 2025-02-23 PROCEDURE — 82962 GLUCOSE BLOOD TEST: CPT

## 2025-02-23 PROCEDURE — 6370000000 HC RX 637 (ALT 250 FOR IP): Performed by: STUDENT IN AN ORGANIZED HEALTH CARE EDUCATION/TRAINING PROGRAM

## 2025-02-23 PROCEDURE — 6370000000 HC RX 637 (ALT 250 FOR IP): Performed by: INTERNAL MEDICINE

## 2025-02-23 PROCEDURE — 2060000000 HC ICU INTERMEDIATE R&B

## 2025-02-23 PROCEDURE — 99239 HOSP IP/OBS DSCHRG MGMT >30: CPT | Performed by: INTERNAL MEDICINE

## 2025-02-23 PROCEDURE — 80053 COMPREHEN METABOLIC PANEL: CPT

## 2025-02-23 PROCEDURE — 36415 COLL VENOUS BLD VENIPUNCTURE: CPT

## 2025-02-23 PROCEDURE — 6360000002 HC RX W HCPCS: Performed by: FAMILY MEDICINE

## 2025-02-23 PROCEDURE — 2500000003 HC RX 250 WO HCPCS: Performed by: FAMILY MEDICINE

## 2025-02-23 PROCEDURE — 85025 COMPLETE CBC W/AUTO DIFF WBC: CPT

## 2025-02-23 PROCEDURE — 6370000000 HC RX 637 (ALT 250 FOR IP): Performed by: SPECIALIST

## 2025-02-23 RX ORDER — ASPIRIN 81 MG/1
81 TABLET, CHEWABLE ORAL DAILY
Status: CANCELLED | OUTPATIENT
Start: 2025-02-24

## 2025-02-23 RX ORDER — ACETAMINOPHEN 650 MG/1
650 SUPPOSITORY RECTAL EVERY 6 HOURS PRN
Status: DISCONTINUED | OUTPATIENT
Start: 2025-02-23 | End: 2025-02-28 | Stop reason: HOSPADM

## 2025-02-23 RX ORDER — SODIUM BICARBONATE 650 MG/1
1300 TABLET ORAL 2 TIMES DAILY
Status: CANCELLED | OUTPATIENT
Start: 2025-02-23

## 2025-02-23 RX ORDER — ENOXAPARIN SODIUM 100 MG/ML
40 INJECTION SUBCUTANEOUS DAILY
Status: CANCELLED | OUTPATIENT
Start: 2025-02-24

## 2025-02-23 RX ORDER — TAMSULOSIN HYDROCHLORIDE 0.4 MG/1
0.4 CAPSULE ORAL NIGHTLY
Status: DISCONTINUED | OUTPATIENT
Start: 2025-02-23 | End: 2025-02-28 | Stop reason: HOSPADM

## 2025-02-23 RX ORDER — TRAMADOL HYDROCHLORIDE 50 MG/1
50 TABLET ORAL EVERY 6 HOURS PRN
Status: CANCELLED | OUTPATIENT
Start: 2025-02-23

## 2025-02-23 RX ORDER — MORPHINE SULFATE 2 MG/ML
2 INJECTION, SOLUTION INTRAMUSCULAR; INTRAVENOUS EVERY 4 HOURS PRN
Status: CANCELLED | OUTPATIENT
Start: 2025-02-23

## 2025-02-23 RX ORDER — SODIUM CHLORIDE 9 MG/ML
INJECTION, SOLUTION INTRAVENOUS PRN
Status: DISCONTINUED | OUTPATIENT
Start: 2025-02-23 | End: 2025-02-28 | Stop reason: HOSPADM

## 2025-02-23 RX ORDER — LOSARTAN POTASSIUM 50 MG/1
25 TABLET ORAL DAILY
Status: DISCONTINUED | OUTPATIENT
Start: 2025-02-23 | End: 2025-02-23 | Stop reason: HOSPADM

## 2025-02-23 RX ORDER — IPRATROPIUM BROMIDE AND ALBUTEROL SULFATE 2.5; .5 MG/3ML; MG/3ML
1 SOLUTION RESPIRATORY (INHALATION) EVERY 4 HOURS PRN
Status: DISCONTINUED | OUTPATIENT
Start: 2025-02-23 | End: 2025-02-28 | Stop reason: HOSPADM

## 2025-02-23 RX ORDER — MORPHINE SULFATE 4 MG/ML
4 INJECTION, SOLUTION INTRAMUSCULAR; INTRAVENOUS EVERY 4 HOURS PRN
Status: DISCONTINUED | OUTPATIENT
Start: 2025-02-23 | End: 2025-02-28 | Stop reason: HOSPADM

## 2025-02-23 RX ORDER — SENNOSIDES A AND B 8.6 MG/1
1 TABLET, FILM COATED ORAL 2 TIMES DAILY
Status: CANCELLED | OUTPATIENT
Start: 2025-02-23

## 2025-02-23 RX ORDER — ACETAMINOPHEN 325 MG/1
650 TABLET ORAL EVERY 4 HOURS PRN
Status: DISCONTINUED | OUTPATIENT
Start: 2025-02-23 | End: 2025-02-23

## 2025-02-23 RX ORDER — DEXTROSE MONOHYDRATE 100 MG/ML
INJECTION, SOLUTION INTRAVENOUS CONTINUOUS PRN
Status: CANCELLED | OUTPATIENT
Start: 2025-02-23

## 2025-02-23 RX ORDER — LOSARTAN POTASSIUM 50 MG/1
25 TABLET ORAL DAILY
Status: CANCELLED | OUTPATIENT
Start: 2025-02-24

## 2025-02-23 RX ORDER — ENOXAPARIN SODIUM 100 MG/ML
40 INJECTION SUBCUTANEOUS DAILY
Status: DISCONTINUED | OUTPATIENT
Start: 2025-02-24 | End: 2025-02-28 | Stop reason: HOSPADM

## 2025-02-23 RX ORDER — TRAMADOL HYDROCHLORIDE 50 MG/1
50 TABLET ORAL EVERY 6 HOURS PRN
Status: DISCONTINUED | OUTPATIENT
Start: 2025-02-23 | End: 2025-02-28 | Stop reason: HOSPADM

## 2025-02-23 RX ORDER — LOSARTAN POTASSIUM 25 MG/1
25 TABLET ORAL DAILY
Status: DISCONTINUED | OUTPATIENT
Start: 2025-02-24 | End: 2025-02-28 | Stop reason: HOSPADM

## 2025-02-23 RX ORDER — SODIUM CHLORIDE 0.9 % (FLUSH) 0.9 %
5-40 SYRINGE (ML) INJECTION EVERY 12 HOURS SCHEDULED
Status: DISCONTINUED | OUTPATIENT
Start: 2025-02-23 | End: 2025-02-28 | Stop reason: HOSPADM

## 2025-02-23 RX ORDER — SODIUM CHLORIDE 1 G/1
1 TABLET ORAL
Status: CANCELLED | OUTPATIENT
Start: 2025-02-23

## 2025-02-23 RX ORDER — SENNOSIDES A AND B 8.6 MG/1
1 TABLET, FILM COATED ORAL 2 TIMES DAILY
Status: DISCONTINUED | OUTPATIENT
Start: 2025-02-23 | End: 2025-02-28 | Stop reason: HOSPADM

## 2025-02-23 RX ORDER — ASCORBIC ACID 500 MG
500 TABLET ORAL DAILY
Status: DISCONTINUED | OUTPATIENT
Start: 2025-02-24 | End: 2025-02-28 | Stop reason: HOSPADM

## 2025-02-23 RX ORDER — SODIUM CHLORIDE 1 G/1
1 TABLET ORAL
Status: DISCONTINUED | OUTPATIENT
Start: 2025-02-24 | End: 2025-02-28 | Stop reason: HOSPADM

## 2025-02-23 RX ORDER — CARVEDILOL 3.12 MG/1
3.12 TABLET ORAL 2 TIMES DAILY WITH MEALS
Status: DISCONTINUED | OUTPATIENT
Start: 2025-02-24 | End: 2025-02-28 | Stop reason: HOSPADM

## 2025-02-23 RX ORDER — SODIUM CHLORIDE 0.9 % (FLUSH) 0.9 %
5-40 SYRINGE (ML) INJECTION PRN
Status: CANCELLED | OUTPATIENT
Start: 2025-02-23

## 2025-02-23 RX ORDER — GABAPENTIN 300 MG/1
300 CAPSULE ORAL 2 TIMES DAILY
Status: DISCONTINUED | OUTPATIENT
Start: 2025-02-23 | End: 2025-02-28 | Stop reason: HOSPADM

## 2025-02-23 RX ORDER — SODIUM BICARBONATE 650 MG/1
1300 TABLET ORAL 2 TIMES DAILY
Status: DISCONTINUED | OUTPATIENT
Start: 2025-02-23 | End: 2025-02-28 | Stop reason: HOSPADM

## 2025-02-23 RX ORDER — ATORVASTATIN CALCIUM 40 MG/1
80 TABLET, FILM COATED ORAL NIGHTLY
Status: CANCELLED | OUTPATIENT
Start: 2025-02-23

## 2025-02-23 RX ORDER — ATORVASTATIN CALCIUM 80 MG/1
80 TABLET, FILM COATED ORAL NIGHTLY
Status: DISCONTINUED | OUTPATIENT
Start: 2025-02-23 | End: 2025-02-28 | Stop reason: HOSPADM

## 2025-02-23 RX ORDER — SODIUM CHLORIDE 0.9 % (FLUSH) 0.9 %
5-40 SYRINGE (ML) INJECTION PRN
Status: DISCONTINUED | OUTPATIENT
Start: 2025-02-23 | End: 2025-02-28 | Stop reason: HOSPADM

## 2025-02-23 RX ORDER — INSULIN LISPRO 100 [IU]/ML
0-4 INJECTION, SOLUTION INTRAVENOUS; SUBCUTANEOUS
Status: CANCELLED | OUTPATIENT
Start: 2025-02-23

## 2025-02-23 RX ORDER — GABAPENTIN 300 MG/1
300 CAPSULE ORAL 2 TIMES DAILY
Status: CANCELLED | OUTPATIENT
Start: 2025-02-23

## 2025-02-23 RX ORDER — INSULIN LISPRO 100 [IU]/ML
0-4 INJECTION, SOLUTION INTRAVENOUS; SUBCUTANEOUS
Status: DISCONTINUED | OUTPATIENT
Start: 2025-02-24 | End: 2025-02-28 | Stop reason: HOSPADM

## 2025-02-23 RX ORDER — ACETAMINOPHEN 650 MG/1
650 SUPPOSITORY RECTAL EVERY 6 HOURS PRN
Status: CANCELLED | OUTPATIENT
Start: 2025-02-23

## 2025-02-23 RX ORDER — POLYETHYLENE GLYCOL 3350 17 G/17G
17 POWDER, FOR SOLUTION ORAL DAILY
Status: DISCONTINUED | OUTPATIENT
Start: 2025-02-24 | End: 2025-02-28 | Stop reason: HOSPADM

## 2025-02-23 RX ORDER — ASPIRIN 81 MG/1
81 TABLET, CHEWABLE ORAL DAILY
Status: DISCONTINUED | OUTPATIENT
Start: 2025-02-24 | End: 2025-02-28 | Stop reason: HOSPADM

## 2025-02-23 RX ORDER — MORPHINE SULFATE 2 MG/ML
2 INJECTION, SOLUTION INTRAMUSCULAR; INTRAVENOUS EVERY 4 HOURS PRN
Status: DISCONTINUED | OUTPATIENT
Start: 2025-02-23 | End: 2025-02-28 | Stop reason: HOSPADM

## 2025-02-23 RX ORDER — IPRATROPIUM BROMIDE AND ALBUTEROL SULFATE 2.5; .5 MG/3ML; MG/3ML
1 SOLUTION RESPIRATORY (INHALATION) EVERY 4 HOURS PRN
Status: CANCELLED | OUTPATIENT
Start: 2025-02-23

## 2025-02-23 RX ORDER — TAMSULOSIN HYDROCHLORIDE 0.4 MG/1
0.4 CAPSULE ORAL NIGHTLY
Status: CANCELLED | OUTPATIENT
Start: 2025-02-23

## 2025-02-23 RX ORDER — FERROUS SULFATE 325(65) MG
325 TABLET ORAL
Status: CANCELLED | OUTPATIENT
Start: 2025-02-24

## 2025-02-23 RX ORDER — CARVEDILOL 3.12 MG/1
3.12 TABLET ORAL 2 TIMES DAILY WITH MEALS
Status: CANCELLED | OUTPATIENT
Start: 2025-02-23

## 2025-02-23 RX ORDER — FOLIC ACID 1 MG/1
1000 TABLET ORAL DAILY
Status: CANCELLED | OUTPATIENT
Start: 2025-02-24

## 2025-02-23 RX ORDER — SODIUM CHLORIDE 9 MG/ML
INJECTION, SOLUTION INTRAVENOUS PRN
Status: CANCELLED | OUTPATIENT
Start: 2025-02-23

## 2025-02-23 RX ORDER — DEXTROSE MONOHYDRATE 100 MG/ML
INJECTION, SOLUTION INTRAVENOUS CONTINUOUS PRN
Status: DISCONTINUED | OUTPATIENT
Start: 2025-02-23 | End: 2025-02-28 | Stop reason: HOSPADM

## 2025-02-23 RX ORDER — POLYETHYLENE GLYCOL 3350 17 G/17G
17 POWDER, FOR SOLUTION ORAL DAILY
Status: CANCELLED | OUTPATIENT
Start: 2025-02-24

## 2025-02-23 RX ORDER — GLUCAGON 1 MG/ML
1 KIT INJECTION PRN
Status: DISCONTINUED | OUTPATIENT
Start: 2025-02-23 | End: 2025-02-28 | Stop reason: HOSPADM

## 2025-02-23 RX ORDER — ACETAMINOPHEN 325 MG/1
650 TABLET ORAL EVERY 4 HOURS PRN
Status: CANCELLED | OUTPATIENT
Start: 2025-02-23

## 2025-02-23 RX ORDER — SODIUM CHLORIDE 0.9 % (FLUSH) 0.9 %
5-40 SYRINGE (ML) INJECTION EVERY 12 HOURS SCHEDULED
Status: CANCELLED | OUTPATIENT
Start: 2025-02-23

## 2025-02-23 RX ORDER — GLUCAGON 1 MG/ML
1 KIT INJECTION PRN
Status: CANCELLED | OUTPATIENT
Start: 2025-02-23

## 2025-02-23 RX ORDER — FOLIC ACID 1 MG/1
1000 TABLET ORAL DAILY
Status: DISCONTINUED | OUTPATIENT
Start: 2025-02-24 | End: 2025-02-28 | Stop reason: HOSPADM

## 2025-02-23 RX ORDER — PANTOPRAZOLE SODIUM 40 MG/1
40 TABLET, DELAYED RELEASE ORAL DAILY
Status: CANCELLED | OUTPATIENT
Start: 2025-02-24

## 2025-02-23 RX ORDER — ACETAMINOPHEN 325 MG/1
650 TABLET ORAL EVERY 6 HOURS PRN
Status: DISCONTINUED | OUTPATIENT
Start: 2025-02-23 | End: 2025-02-28 | Stop reason: HOSPADM

## 2025-02-23 RX ORDER — PANTOPRAZOLE SODIUM 40 MG/1
40 TABLET, DELAYED RELEASE ORAL DAILY
Status: DISCONTINUED | OUTPATIENT
Start: 2025-02-24 | End: 2025-02-28 | Stop reason: HOSPADM

## 2025-02-23 RX ORDER — ACETAMINOPHEN 325 MG/1
650 TABLET ORAL EVERY 6 HOURS PRN
Status: CANCELLED | OUTPATIENT
Start: 2025-02-23

## 2025-02-23 RX ORDER — MORPHINE SULFATE 4 MG/ML
4 INJECTION, SOLUTION INTRAMUSCULAR; INTRAVENOUS EVERY 4 HOURS PRN
Status: CANCELLED | OUTPATIENT
Start: 2025-02-23

## 2025-02-23 RX ORDER — FERROUS SULFATE 325(65) MG
325 TABLET ORAL
Status: DISCONTINUED | OUTPATIENT
Start: 2025-02-24 | End: 2025-02-28 | Stop reason: HOSPADM

## 2025-02-23 RX ORDER — ASCORBIC ACID 500 MG
500 TABLET ORAL DAILY
Status: CANCELLED | OUTPATIENT
Start: 2025-02-24

## 2025-02-23 RX ADMIN — TAMSULOSIN HYDROCHLORIDE 0.4 MG: 0.4 CAPSULE ORAL at 21:45

## 2025-02-23 RX ADMIN — FOLIC ACID 1000 MCG: 1 TABLET ORAL at 09:10

## 2025-02-23 RX ADMIN — SODIUM CHLORIDE, PRESERVATIVE FREE 10 ML: 5 INJECTION INTRAVENOUS at 21:47

## 2025-02-23 RX ADMIN — Medication 1 G: at 11:56

## 2025-02-23 RX ADMIN — PANTOPRAZOLE SODIUM 40 MG: 40 TABLET, DELAYED RELEASE ORAL at 09:10

## 2025-02-23 RX ADMIN — ENOXAPARIN SODIUM 40 MG: 100 INJECTION SUBCUTANEOUS at 09:10

## 2025-02-23 RX ADMIN — GABAPENTIN 300 MG: 300 CAPSULE ORAL at 21:45

## 2025-02-23 RX ADMIN — TRAMADOL HYDROCHLORIDE 50 MG: 50 TABLET, COATED ORAL at 02:19

## 2025-02-23 RX ADMIN — Medication 10 ML: at 09:14

## 2025-02-23 RX ADMIN — OXYCODONE HYDROCHLORIDE AND ACETAMINOPHEN 500 MG: 500 TABLET ORAL at 09:10

## 2025-02-23 RX ADMIN — FERROUS SULFATE TAB 325 MG (65 MG ELEMENTAL FE) 325 MG: 325 (65 FE) TAB at 09:10

## 2025-02-23 RX ADMIN — SODIUM BICARBONATE 1300 MG: 650 TABLET ORAL at 21:45

## 2025-02-23 RX ADMIN — ATORVASTATIN CALCIUM 80 MG: 80 TABLET, FILM COATED ORAL at 21:45

## 2025-02-23 RX ADMIN — SENNOSIDES 8.6 MG: 8.6 TABLET, COATED ORAL at 09:10

## 2025-02-23 RX ADMIN — TRAMADOL HYDROCHLORIDE 50 MG: 50 TABLET, COATED ORAL at 02:21

## 2025-02-23 RX ADMIN — TRAMADOL HYDROCHLORIDE 50 MG: 50 TABLET, COATED ORAL at 14:34

## 2025-02-23 RX ADMIN — CARVEDILOL 3.12 MG: 3.12 TABLET, FILM COATED ORAL at 09:12

## 2025-02-23 RX ADMIN — GABAPENTIN 300 MG: 300 CAPSULE ORAL at 09:10

## 2025-02-23 RX ADMIN — TRAMADOL HYDROCHLORIDE 50 MG: 50 TABLET, COATED ORAL at 21:45

## 2025-02-23 RX ADMIN — CARVEDILOL 3.12 MG: 3.12 TABLET, FILM COATED ORAL at 16:59

## 2025-02-23 RX ADMIN — ASPIRIN 81 MG: 81 TABLET, CHEWABLE ORAL at 09:10

## 2025-02-23 RX ADMIN — TRAMADOL HYDROCHLORIDE 50 MG: 50 TABLET, COATED ORAL at 08:33

## 2025-02-23 RX ADMIN — Medication 1 G: at 16:59

## 2025-02-23 RX ADMIN — EMPAGLIFLOZIN 10 MG: 10 TABLET, FILM COATED ORAL at 09:10

## 2025-02-23 RX ADMIN — SODIUM BICARBONATE 650 MG TABLET 1300 MG: at 09:10

## 2025-02-23 RX ADMIN — Medication 1 G: at 09:12

## 2025-02-23 ASSESSMENT — PAIN SCALES - GENERAL
PAINLEVEL_OUTOF10: 7
PAINLEVEL_OUTOF10: 8
PAINLEVEL_OUTOF10: 7
PAINLEVEL_OUTOF10: 8
PAINLEVEL_OUTOF10: 7

## 2025-02-23 ASSESSMENT — PAIN DESCRIPTION - LOCATION
LOCATION: BACK
LOCATION: BACK
LOCATION: BACK;FOOT

## 2025-02-23 ASSESSMENT — PAIN DESCRIPTION - ORIENTATION
ORIENTATION: RIGHT;LOWER
ORIENTATION: RIGHT;LEFT;LOWER;MID

## 2025-02-23 ASSESSMENT — PAIN DESCRIPTION - DESCRIPTORS
DESCRIPTORS: ACHING;TENDER;DISCOMFORT
DESCRIPTORS: ACHING;DISCOMFORT

## 2025-02-23 NOTE — FLOWSHEET NOTE
Report Called to Agatha at [facility] [phone] at 18:20. Patient to be transported to room 7415 Bed A. Admitting doctor Abner Garcia.

## 2025-02-23 NOTE — PLAN OF CARE
Problem: Chronic Conditions and Co-morbidities  Goal: Patient's chronic conditions and co-morbidity symptoms are monitored and maintained or improved  2/18/2025 0734 by Janette Lr, RN  Outcome: Progressing  2/18/2025 0130 by Nina Roland, RN  Outcome: Progressing     
  Problem: Chronic Conditions and Co-morbidities  Goal: Patient's chronic conditions and co-morbidity symptoms are monitored and maintained or improved  2/19/2025 0718 by Janette Lr, RN  Outcome: Progressing  2/18/2025 2307 by Nina Roland, RN  Outcome: Progressing     
  Problem: Chronic Conditions and Co-morbidities  Goal: Patient's chronic conditions and co-morbidity symptoms are monitored and maintained or improved  2/21/2025 1259 by Aretha Wilson RN  Outcome: Progressing     Problem: Discharge Planning  Goal: Discharge to home or other facility with appropriate resources  2/21/2025 1259 by Aretha Wilson RN  Outcome: Progressing     Problem: Pain  Goal: Verbalizes/displays adequate comfort level or baseline comfort level  2/21/2025 1259 by Aretha Wilson RN  Outcome: Progressing     Problem: Safety - Adult  Goal: Free from fall injury  2/21/2025 1259 by Aretha Wilson RN  Outcome: Progressing     Problem: Skin/Tissue Integrity  Goal: Skin integrity remains intact  Description: 1.  Monitor for areas of redness and/or skin breakdown  2.  Assess vascular access sites hourly  3.  Every 4-6 hours minimum:  Change oxygen saturation probe site  4.  Every 4-6 hours:  If on nasal continuous positive airway pressure, respiratory therapy assess nares and determine need for appliance change or resting period  2/21/2025 1259 by Aretha Wilson RN  Outcome: Progressing     Problem: Nutrition Deficit:  Goal: Optimize nutritional status  2/21/2025 1259 by Aretha Wilson RN  Outcome: Progressing     Problem: Respiratory - Adult  Goal: Achieves optimal ventilation and oxygenation  2/21/2025 1259 by Aretha Wilson RN  Outcome: Progressing     Problem: Skin/Tissue Integrity - Adult  Goal: Incisions, wounds, or drain sites healing without S/S of infection  2/21/2025 1259 by Aretha Wilson RN  Outcome: Progressing     Problem: Musculoskeletal - Adult  Goal: Return mobility to safest level of function  2/21/2025 1259 by Aretha Wilson RN  Outcome: Progressing     Problem: Gastrointestinal - Adult  Goal: Maintains adequate nutritional intake  2/21/2025 1259 by Aretha Wilson RN  Outcome: Progressing     Problem: Genitourinary - Adult  Goal: Urinary catheter remains patent  2/21/2025 1259 by 
  Problem: Chronic Conditions and Co-morbidities  Goal: Patient's chronic conditions and co-morbidity symptoms are monitored and maintained or improved  Outcome: Progressing     Problem: Discharge Planning  Goal: Discharge to home or other facility with appropriate resources  Outcome: Progressing     Problem: Pain  Goal: Verbalizes/displays adequate comfort level or baseline comfort level  Outcome: Progressing     
  Problem: Chronic Conditions and Co-morbidities  Goal: Patient's chronic conditions and co-morbidity symptoms are monitored and maintained or improved  Outcome: Progressing     Problem: Discharge Planning  Goal: Discharge to home or other facility with appropriate resources  Outcome: Progressing     Problem: Pain  Goal: Verbalizes/displays adequate comfort level or baseline comfort level  Outcome: Progressing     Problem: Neurosensory - Adult  Goal: Achieves stable or improved neurological status  Outcome: Progressing     Problem: Skin/Tissue Integrity - Adult  Goal: Incisions, wounds, or drain sites healing without S/S of infection  Outcome: Progressing     Problem: Musculoskeletal - Adult  Goal: Return mobility to safest level of function  Outcome: Progressing     Problem: Genitourinary - Adult  Goal: Urinary catheter remains patent  Outcome: Progressing  Flowsheets (Taken 2/22/2025 2000)  Urinary catheter remains patent: Assess patency of urinary catheter     Problem: Hematologic - Adult  Goal: Maintains hematologic stability  Outcome: Progressing     
  Problem: Chronic Conditions and Co-morbidities  Goal: Patient's chronic conditions and co-morbidity symptoms are monitored and maintained or improved  Outcome: Progressing     Problem: Discharge Planning  Goal: Discharge to home or other facility with appropriate resources  Outcome: Progressing     Problem: Pain  Goal: Verbalizes/displays adequate comfort level or baseline comfort level  Outcome: Progressing     Problem: Safety - Adult  Goal: Free from fall injury  Outcome: Progressing     Problem: Skin/Tissue Integrity  Goal: Skin integrity remains intact  Description: 1.  Monitor for areas of redness and/or skin breakdown  2.  Assess vascular access sites hourly  3.  Every 4-6 hours minimum:  Change oxygen saturation probe site  4.  Every 4-6 hours:  If on nasal continuous positive airway pressure, respiratory therapy assess nares and determine need for appliance change or resting period  Outcome: Progressing     
  Problem: Chronic Conditions and Co-morbidities  Goal: Patient's chronic conditions and co-morbidity symptoms are monitored and maintained or improved  Outcome: Progressing     Problem: Discharge Planning  Goal: Discharge to home or other facility with appropriate resources  Outcome: Progressing     Problem: Pain  Goal: Verbalizes/displays adequate comfort level or baseline comfort level  Outcome: Progressing     Problem: Safety - Adult  Goal: Free from fall injury  Outcome: Progressing     Problem: Skin/Tissue Integrity  Goal: Skin integrity remains intact  Description: 1.  Monitor for areas of redness and/or skin breakdown  2.  Assess vascular access sites hourly  3.  Every 4-6 hours minimum:  Change oxygen saturation probe site  4.  Every 4-6 hours:  If on nasal continuous positive airway pressure, respiratory therapy assess nares and determine need for appliance change or resting period  Outcome: Progressing     Problem: Nutrition Deficit:  Goal: Optimize nutritional status  2/18/2025 2307 by Nina Roland RN  Outcome: Progressing  2/18/2025 1325 by Janette Arnold, RD, LD  Flowsheets (Taken 2/18/2025 1325)  Nutrient intake appropriate for improving, restoring, or maintaining nutritional needs:   Assess nutritional status and recommend course of action   Monitor oral intake, labs, and treatment plans   Recommend appropriate diets, oral nutritional supplements, and vitamin/mineral supplements   Order, calculate, and assess calorie counts as needed     
  Problem: Chronic Conditions and Co-morbidities  Goal: Patient's chronic conditions and co-morbidity symptoms are monitored and maintained or improved  Outcome: Progressing     Problem: Discharge Planning  Goal: Discharge to home or other facility with appropriate resources  Outcome: Progressing     Problem: Pain  Goal: Verbalizes/displays adequate comfort level or baseline comfort level  Outcome: Progressing     Problem: Safety - Adult  Goal: Free from fall injury  Outcome: Progressing     Problem: Skin/Tissue Integrity  Goal: Skin integrity remains intact  Description: 1.  Monitor for areas of redness and/or skin breakdown  2.  Assess vascular access sites hourly  3.  Every 4-6 hours minimum:  Change oxygen saturation probe site  4.  Every 4-6 hours:  If on nasal continuous positive airway pressure, respiratory therapy assess nares and determine need for appliance change or resting period  Outcome: Progressing     Problem: Nutrition Deficit:  Goal: Optimize nutritional status  Outcome: Progressing     
  Problem: Chronic Conditions and Co-morbidities  Goal: Patient's chronic conditions and co-morbidity symptoms are monitored and maintained or improved  Outcome: Progressing     Problem: Discharge Planning  Goal: Discharge to home or other facility with appropriate resources  Outcome: Progressing     Problem: Pain  Goal: Verbalizes/displays adequate comfort level or baseline comfort level  Outcome: Progressing     Problem: Safety - Adult  Goal: Free from fall injury  Outcome: Progressing     Problem: Skin/Tissue Integrity  Goal: Skin integrity remains intact  Description: 1.  Monitor for areas of redness and/or skin breakdown  2.  Assess vascular access sites hourly  3.  Every 4-6 hours minimum:  Change oxygen saturation probe site  4.  Every 4-6 hours:  If on nasal continuous positive airway pressure, respiratory therapy assess nares and determine need for appliance change or resting period  Outcome: Progressing     Problem: Nutrition Deficit:  Goal: Optimize nutritional status  Outcome: Progressing     Problem: Neurosensory - Adult  Goal: Achieves stable or improved neurological status  Outcome: Progressing     Problem: Respiratory - Adult  Goal: Achieves optimal ventilation and oxygenation  Outcome: Progressing     Problem: Skin/Tissue Integrity - Adult  Goal: Incisions, wounds, or drain sites healing without S/S of infection  Outcome: Progressing     Problem: Musculoskeletal - Adult  Goal: Return mobility to safest level of function  Outcome: Progressing     Problem: Gastrointestinal - Adult  Goal: Maintains adequate nutritional intake  Outcome: Progressing     Problem: Genitourinary - Adult  Goal: Urinary catheter remains patent  Outcome: Progressing     Problem: Infection - Adult  Goal: Absence of infection at discharge  Outcome: Progressing     Problem: Metabolic/Fluid and Electrolytes - Adult  Goal: Glucose maintained within prescribed range  Outcome: Progressing     Problem: Hematologic - Adult  Goal: 
infection  Outcome: Progressing     Problem: Musculoskeletal - Adult  Goal: Return mobility to safest level of function  Outcome: Progressing     Problem: Gastrointestinal - Adult  Goal: Maintains adequate nutritional intake  Outcome: Progressing     Problem: Genitourinary - Adult  Goal: Urinary catheter remains patent  Outcome: Progressing     Problem: Infection - Adult  Goal: Absence of infection at discharge  Outcome: Progressing     Problem: Metabolic/Fluid and Electrolytes - Adult  Goal: Glucose maintained within prescribed range  Outcome: Progressing     Problem: Hematologic - Adult  Goal: Maintains hematologic stability  Outcome: Progressing

## 2025-02-23 NOTE — DISCHARGE SUMMARY
02/23/2025 08:13 AM    BASOSABS 0.03 02/23/2025 08:13 AM     CMP:    Lab Results   Component Value Date/Time     02/23/2025 08:13 AM    K 3.8 02/23/2025 08:13 AM    K 3.6 09/14/2022 06:34 AM     02/23/2025 08:13 AM    CO2 22 02/23/2025 08:13 AM    BUN 8 02/23/2025 08:13 AM    CREATININE 0.8 02/23/2025 08:13 AM    GFRAA >60 09/26/2022 06:24 AM    LABGLOM >90 02/23/2025 08:13 AM    GLUCOSE 93 02/23/2025 08:13 AM    CALCIUM 8.7 02/23/2025 08:13 AM    BILITOT 0.5 02/23/2025 08:13 AM    ALKPHOS 148 02/23/2025 08:13 AM    AST 18 02/23/2025 08:13 AM    ALT 9 02/23/2025 08:13 AM       Imaging:  XR FOOT RIGHT (MIN 3 VIEWS)    Result Date: 2/17/2025  EXAMINATION: THREE XRAY VIEWS OF THE RIGHT FOOT 2/17/2025 4:51 pm COMPARISON: None. HISTORY: ORDERING SYSTEM PROVIDED HISTORY: heel wound, Right posterior TECHNOLOGIST PROVIDED HISTORY: Reason for exam:->heel wound, Right posterior FINDINGS: No fracture, dislocation or osseous lesion.  No significant degenerative change. Dressing in position over the posterior ankle.  No significant underlying ulceration, soft tissue gas, or foreign body material detected, however, there may be thinning of the underlying Achilles tendon.  Soft tissues unremarkable.     1. No fracture or dislocation. 2. Possible thinning of the Achilles tendon underlying a wound. RECOMMENDATION: Consider additional characterization by MRI as clinically indicated.     CT CHEST WO CONTRAST    Result Date: 2/17/2025  EXAMINATION: CT OF THE CHEST WITHOUT CONTRAST 2/17/2025 4:33 pm TECHNIQUE: CT of the chest was performed without the administration of intravenous contrast. Multiplanar reformatted images are provided for review. Automated exposure control, iterative reconstruction, and/or weight based adjustment of the mA/kV was utilized to reduce the radiation dose to as low as reasonably achievable. COMPARISON: 11/16/2024 HISTORY: ORDERING SYSTEM PROVIDED HISTORY: pneumonia TECHNOLOGIST PROVIDED HISTORY:

## 2025-02-24 PROBLEM — I25.10 CAD IN NATIVE ARTERY: Status: ACTIVE | Noted: 2025-02-24

## 2025-02-24 PROBLEM — Z01.818 PRE-OP EVALUATION: Status: ACTIVE | Noted: 2025-02-24

## 2025-02-24 LAB
ALBUMIN SERPL-MCNC: 3 G/DL (ref 3.5–5.2)
ALP SERPL-CCNC: 150 U/L (ref 40–129)
ALT SERPL-CCNC: 11 U/L (ref 0–40)
ANION GAP SERPL CALCULATED.3IONS-SCNC: 10 MMOL/L (ref 7–16)
AST SERPL-CCNC: 21 U/L (ref 0–39)
BASOPHILS # BLD: 0.04 K/UL (ref 0–0.2)
BASOPHILS NFR BLD: 1 % (ref 0–2)
BILIRUB SERPL-MCNC: 0.5 MG/DL (ref 0–1.2)
BNP SERPL-MCNC: 565 PG/ML (ref 0–125)
BUN SERPL-MCNC: 9 MG/DL (ref 6–23)
CALCIUM SERPL-MCNC: 9 MG/DL (ref 8.6–10.2)
CHLORIDE SERPL-SCNC: 104 MMOL/L (ref 98–107)
CHOLEST SERPL-MCNC: 96 MG/DL
CO2 SERPL-SCNC: 23 MMOL/L (ref 22–29)
CREAT SERPL-MCNC: 0.7 MG/DL (ref 0.7–1.2)
EKG ATRIAL RATE: 65 BPM
EKG P AXIS: 39 DEGREES
EKG P-R INTERVAL: 192 MS
EKG Q-T INTERVAL: 432 MS
EKG QRS DURATION: 88 MS
EKG QTC CALCULATION (BAZETT): 449 MS
EKG R AXIS: 18 DEGREES
EKG T AXIS: 22 DEGREES
EKG VENTRICULAR RATE: 65 BPM
EOSINOPHIL # BLD: 0.3 K/UL (ref 0.05–0.5)
EOSINOPHILS RELATIVE PERCENT: 4 % (ref 0–6)
ERYTHROCYTE [DISTWIDTH] IN BLOOD BY AUTOMATED COUNT: 16 % (ref 11.5–15)
GFR, ESTIMATED: >90 ML/MIN/1.73M2
GLUCOSE BLD-MCNC: 115 MG/DL (ref 74–99)
GLUCOSE BLD-MCNC: 124 MG/DL (ref 74–99)
GLUCOSE BLD-MCNC: 133 MG/DL (ref 74–99)
GLUCOSE BLD-MCNC: 138 MG/DL (ref 74–99)
GLUCOSE SERPL-MCNC: 104 MG/DL (ref 74–99)
HCT VFR BLD AUTO: 29.1 % (ref 37–54)
HDLC SERPL-MCNC: 31 MG/DL
HGB BLD-MCNC: 9.4 G/DL (ref 12.5–16.5)
IMM GRANULOCYTES # BLD AUTO: 0.04 K/UL (ref 0–0.58)
IMM GRANULOCYTES NFR BLD: 1 % (ref 0–5)
LDLC SERPL CALC-MCNC: 46 MG/DL
LYMPHOCYTES NFR BLD: 1.66 K/UL (ref 1.5–4)
LYMPHOCYTES RELATIVE PERCENT: 20 % (ref 20–42)
MCH RBC QN AUTO: 28.9 PG (ref 26–35)
MCHC RBC AUTO-ENTMCNC: 32.3 G/DL (ref 32–34.5)
MCV RBC AUTO: 89.5 FL (ref 80–99.9)
MONOCYTES NFR BLD: 0.56 K/UL (ref 0.1–0.95)
MONOCYTES NFR BLD: 7 % (ref 2–12)
NEUTROPHILS NFR BLD: 68 % (ref 43–80)
NEUTS SEG NFR BLD: 5.57 K/UL (ref 1.8–7.3)
PLATELET # BLD AUTO: 237 K/UL (ref 130–450)
PMV BLD AUTO: 9.9 FL (ref 7–12)
POTASSIUM SERPL-SCNC: 3.8 MMOL/L (ref 3.5–5)
PROT SERPL-MCNC: 6.2 G/DL (ref 6.4–8.3)
RBC # BLD AUTO: 3.25 M/UL (ref 3.8–5.8)
SODIUM SERPL-SCNC: 137 MMOL/L (ref 132–146)
T4 FREE SERPL-MCNC: 1.5 NG/DL (ref 0.9–1.7)
TRIGL SERPL-MCNC: 97 MG/DL
TSH SERPL DL<=0.05 MIU/L-ACNC: 2.71 UIU/ML (ref 0.27–4.2)
VLDLC SERPL CALC-MCNC: 19 MG/DL
WBC OTHER # BLD: 8.2 K/UL (ref 4.5–11.5)

## 2025-02-24 PROCEDURE — 99222 1ST HOSP IP/OBS MODERATE 55: CPT | Performed by: INTERNAL MEDICINE

## 2025-02-24 PROCEDURE — 85025 COMPLETE CBC W/AUTO DIFF WBC: CPT

## 2025-02-24 PROCEDURE — 2060000000 HC ICU INTERMEDIATE R&B

## 2025-02-24 PROCEDURE — 6360000002 HC RX W HCPCS: Performed by: INTERNAL MEDICINE

## 2025-02-24 PROCEDURE — 93010 ELECTROCARDIOGRAM REPORT: CPT | Performed by: INTERNAL MEDICINE

## 2025-02-24 PROCEDURE — B24BZZ4 ULTRASONOGRAPHY OF HEART WITH AORTA, TRANSESOPHAGEAL: ICD-10-PCS | Performed by: INTERNAL MEDICINE

## 2025-02-24 PROCEDURE — 99223 1ST HOSP IP/OBS HIGH 75: CPT | Performed by: INTERNAL MEDICINE

## 2025-02-24 PROCEDURE — 99222 1ST HOSP IP/OBS MODERATE 55: CPT | Performed by: THORACIC SURGERY (CARDIOTHORACIC VASCULAR SURGERY)

## 2025-02-24 PROCEDURE — 83880 ASSAY OF NATRIURETIC PEPTIDE: CPT

## 2025-02-24 PROCEDURE — 6370000000 HC RX 637 (ALT 250 FOR IP): Performed by: INTERNAL MEDICINE

## 2025-02-24 PROCEDURE — 80061 LIPID PANEL: CPT

## 2025-02-24 PROCEDURE — APPSS60 APP SPLIT SHARED TIME 46-60 MINUTES

## 2025-02-24 PROCEDURE — 84443 ASSAY THYROID STIM HORMONE: CPT

## 2025-02-24 PROCEDURE — 93005 ELECTROCARDIOGRAM TRACING: CPT

## 2025-02-24 PROCEDURE — 51702 INSERT TEMP BLADDER CATH: CPT

## 2025-02-24 PROCEDURE — 80053 COMPREHEN METABOLIC PANEL: CPT

## 2025-02-24 PROCEDURE — 82962 GLUCOSE BLOOD TEST: CPT

## 2025-02-24 PROCEDURE — 36415 COLL VENOUS BLD VENIPUNCTURE: CPT

## 2025-02-24 PROCEDURE — 2500000003 HC RX 250 WO HCPCS: Performed by: INTERNAL MEDICINE

## 2025-02-24 PROCEDURE — 84439 ASSAY OF FREE THYROXINE: CPT

## 2025-02-24 RX ADMIN — ENOXAPARIN SODIUM 40 MG: 100 INJECTION SUBCUTANEOUS at 09:35

## 2025-02-24 RX ADMIN — MORPHINE SULFATE 2 MG: 2 INJECTION, SOLUTION INTRAMUSCULAR; INTRAVENOUS at 09:38

## 2025-02-24 RX ADMIN — LOSARTAN POTASSIUM 25 MG: 25 TABLET, FILM COATED ORAL at 09:35

## 2025-02-24 RX ADMIN — SODIUM CHLORIDE, PRESERVATIVE FREE 10 ML: 5 INJECTION INTRAVENOUS at 20:08

## 2025-02-24 RX ADMIN — FOLIC ACID 1000 MCG: 1 TABLET ORAL at 09:35

## 2025-02-24 RX ADMIN — ASPIRIN 81 MG CHEWABLE TABLET 81 MG: 81 TABLET CHEWABLE at 09:35

## 2025-02-24 RX ADMIN — CARVEDILOL 3.12 MG: 3.12 TABLET, FILM COATED ORAL at 09:36

## 2025-02-24 RX ADMIN — FERROUS SULFATE TAB 325 MG (65 MG ELEMENTAL FE) 325 MG: 325 (65 FE) TAB at 09:35

## 2025-02-24 RX ADMIN — SODIUM CHLORIDE, PRESERVATIVE FREE 10 ML: 5 INJECTION INTRAVENOUS at 20:06

## 2025-02-24 RX ADMIN — ATORVASTATIN CALCIUM 80 MG: 80 TABLET, FILM COATED ORAL at 20:04

## 2025-02-24 RX ADMIN — TRAMADOL HYDROCHLORIDE 50 MG: 50 TABLET, COATED ORAL at 23:49

## 2025-02-24 RX ADMIN — GABAPENTIN 300 MG: 300 CAPSULE ORAL at 20:04

## 2025-02-24 RX ADMIN — PANTOPRAZOLE SODIUM 40 MG: 40 TABLET, DELAYED RELEASE ORAL at 09:35

## 2025-02-24 RX ADMIN — SODIUM CHLORIDE, PRESERVATIVE FREE 10 ML: 5 INJECTION INTRAVENOUS at 09:38

## 2025-02-24 RX ADMIN — TAMSULOSIN HYDROCHLORIDE 0.4 MG: 0.4 CAPSULE ORAL at 20:04

## 2025-02-24 RX ADMIN — SODIUM CHLORIDE 1 G: 1 TABLET ORAL at 11:29

## 2025-02-24 RX ADMIN — TRAMADOL HYDROCHLORIDE 50 MG: 50 TABLET, COATED ORAL at 17:48

## 2025-02-24 RX ADMIN — SODIUM CHLORIDE 1 G: 1 TABLET ORAL at 09:36

## 2025-02-24 RX ADMIN — SODIUM CHLORIDE 1 G: 1 TABLET ORAL at 16:50

## 2025-02-24 RX ADMIN — TRAMADOL HYDROCHLORIDE 50 MG: 50 TABLET, COATED ORAL at 11:29

## 2025-02-24 RX ADMIN — SODIUM CHLORIDE, PRESERVATIVE FREE 10 ML: 5 INJECTION INTRAVENOUS at 09:37

## 2025-02-24 RX ADMIN — EMPAGLIFLOZIN 10 MG: 10 TABLET, FILM COATED ORAL at 09:35

## 2025-02-24 RX ADMIN — GABAPENTIN 300 MG: 300 CAPSULE ORAL at 09:35

## 2025-02-24 RX ADMIN — SENNOSIDES 8.6 MG: 8.6 TABLET, FILM COATED ORAL at 09:35

## 2025-02-24 RX ADMIN — SODIUM BICARBONATE 1300 MG: 650 TABLET ORAL at 09:35

## 2025-02-24 RX ADMIN — SODIUM BICARBONATE 1300 MG: 650 TABLET ORAL at 20:04

## 2025-02-24 RX ADMIN — CARVEDILOL 3.12 MG: 3.12 TABLET, FILM COATED ORAL at 16:50

## 2025-02-24 RX ADMIN — Medication 500 MG: at 09:35

## 2025-02-24 ASSESSMENT — PAIN SCALES - GENERAL
PAINLEVEL_OUTOF10: 7
PAINLEVEL_OUTOF10: 7
PAINLEVEL_OUTOF10: 5
PAINLEVEL_OUTOF10: 8
PAINLEVEL_OUTOF10: 8
PAINLEVEL_OUTOF10: 7
PAINLEVEL_OUTOF10: 7
PAINLEVEL_OUTOF10: 8

## 2025-02-24 ASSESSMENT — PAIN DESCRIPTION - ORIENTATION
ORIENTATION: RIGHT;MID;LOWER
ORIENTATION: RIGHT;MID;LOWER

## 2025-02-24 ASSESSMENT — PAIN DESCRIPTION - DESCRIPTORS
DESCRIPTORS: ACHING
DESCRIPTORS: ACHING;DISCOMFORT;SORE
DESCRIPTORS: ACHING
DESCRIPTORS: ACHING

## 2025-02-24 ASSESSMENT — PAIN DESCRIPTION - LOCATION
LOCATION: BACK;FOOT
LOCATION: FOOT;BACK
LOCATION: FOOT;BACK
LOCATION: BACK;FOOT
LOCATION: BACK;FOOT

## 2025-02-24 NOTE — PROGRESS NOTES
Date:  2/22/2025  Patient: Jerome Steel  Admission:  2/16/2025  5:08 PM  Admit DX: SBO (small bowel obstruction) ([facility]) [K56.609]  Intestinal obstruction without gangrene due to right inguinal hernia [K40.30]  Age:  64 y.o., 1960     LOS: 6 days       SUBJECTIVE:    I am covering Dr. Duarte today  Patient is seen and examined on his behalf  He is doing fairly well denies any cardiac symptoms        OBJECTIVE:    /65   Pulse 70   Temp 98.1 °F (36.7 °C) (Oral)   Resp 23   Ht 1.778 m (5' 10\")   Wt 67.1 kg (148 lb)   SpO2 95%   BMI 21.24 kg/m²     Intake/Output Summary (Last 24 hours) at 2/22/2025 2309  Last data filed at 2/22/2025 2225  Gross per 24 hour   Intake 200 ml   Output 1050 ml   Net -850 ml     Examination:  General: Alert and oriented, No acute distress.  Neck: Supple. No jugular venous distention.  Respiratory: Lungs are clear to auscultation, Respirations are non-labored, Breath sounds are equal, Symmetrical chest wall expansion.  Cardiovascular: Normal rate, No murmur, No gallop, Good pulses equal in all extremities, No edema.  Gastrointestinal: Soft, Non-tender, Non-distended, Normal bowel sounds.  Musculoskeletal: Normal strength, No tenderness, No deformity.  Neurologic: Alert, Oriented, No focal deficits.  Cognition and Speech: Oriented, Speech clear and coherent.    Current Inpatient Medications:   sodium chloride flush  5-40 mL IntraVENous 2 times per day    aspirin  81 mg Oral Daily    polyethylene glycol  17 g Oral Daily    senna  1 tablet Oral BID    sodium chloride flush  5-40 mL IntraVENous 2 times per day    enoxaparin  40 mg SubCUTAneous Daily    levofloxacin  750 mg IntraVENous Q24H    atorvastatin  80 mg Oral Nightly    carvedilol  3.125 mg Oral BID with meals    empagliflozin  10 mg Oral Daily    ferrous sulfate  325 mg Oral Daily with breakfast    folic acid  1,000 mcg Oral Daily    gabapentin  300 mg Oral BID    losartan  100 mg Oral Daily    pantoprazole  40 mg 
  Date:  2/23/2025  Patient: Jerome Steel  Admission:  2/16/2025  5:08 PM  Admit DX: SBO (small bowel obstruction) ([facility]) [K56.609]  Intestinal obstruction without gangrene due to right inguinal hernia [K40.30]  Age:  64 y.o., 1960     LOS: 7 days       SUBJECTIVE:    Remains asymptomatic  No chest pain  No Dyspnea  No orthopnea        OBJECTIVE:    BP (!) 117/59   Pulse 61   Temp 97.7 °F (36.5 °C)   Resp 18   Ht 1.778 m (5' 10\")   Wt 67.1 kg (148 lb)   SpO2 95%   BMI 21.24 kg/m²     Intake/Output Summary (Last 24 hours) at 2/24/2025 0712  Last data filed at 2/23/2025 1546  Gross per 24 hour   Intake --   Output 550 ml   Net -550 ml     Examination:  General: Alert and oriented, No acute distress.  Neck: Supple. No jugular venous distention.  Respiratory: Lungs are clear to auscultation, Respirations are non-labored, Breath sounds are equal, Symmetrical chest wall expansion.  Cardiovascular: Normal rate, No murmur, No gallop, Good pulses equal in all extremities, No edema.  Gastrointestinal: Soft, Non-tender, Non-distended, Normal bowel sounds.  Musculoskeletal: Normal strength, No tenderness, No deformity.  Neurologic: Alert, Oriented, No focal deficits.  Cognition and Speech: Oriented, Speech clear and coherent.    Current Inpatient Medications:        IV Infusions (if any):          Labs:   CBC:   Recent Labs     02/23/25  0813 02/24/25  0614   WBC 10.2 8.2   HGB 9.6* 9.4*   HCT 29.7* 29.1*    237     BMP:   Recent Labs     02/23/25  0813      K 3.8   CO2 22   BUN 8   CREATININE 0.8   LABGLOM >90   GLUCOSE 93     BNP: No results for input(s): \"BNP\" in the last 72 hours.  PT/INR:   Recent Labs     02/21/25  2227   PROTIME 15.5*   INR 1.4     APTT:No results for input(s): \"APTT\" in the last 72 hours.  CARDIAC ENZYMES:No results for input(s): \"CKTOTAL\", \"CKMB\", \"CKMBINDEX\", \"TROPONINI\" in the last 72 hours.  FASTING LIPID PANEL:  Lab Results   Component Value Date/Time    HDL 43 
  Physician Progress Note      PATIENT:               LUCIO ORELLANA  CSN #:                  [id number]  :                       1960  ADMIT DATE:       2025 5:08 PM  DISCH DATE:  RESPONDING  PROVIDER #:        Charisma Barnes MD          QUERY TEXT:    Patient admitted with inguinal hernia. Noted to have documentation of moderate   malnutrition in Dietician progress note dated . If possible, please   document in progress notes and discharge summary if you are evaluating and /or   treating any of the following:    The medical record reflects the following:  Risk Factors: DM, COPD, acute on chronic illness.  Clinical Indicators: Per Dietician progress note dated , Energy Intake:    75% or less estimated energy requirements for 1 month or longer Weight Loss:    5% over 1 month (-22% in one month)   Body Fat Loss:   (moderate) Orbital,   Triceps Muscle Mass Loss:   (moderate) Temples (temporalis), Clavicles   (pectoralis & deltoids) Fluid Accumulation:  Mild Extremities  Treatment:  Dietician consult    ASPEN Criteria:    https://aspenjournals.onlinelibrary.patrick.com/doi/full/10.1177/[email or web address]  5  Options provided:  -- Protein calorie malnutrition moderate  -- Other - I will add my own diagnosis  -- Disagree - Not applicable / Not valid  -- Disagree - Clinically unable to determine / Unknown  -- Refer to Clinical Documentation Reviewer    PROVIDER RESPONSE TEXT:    This patient has moderate protein calorie malnutrition.    Query created by: Colleen España on 2025 4:30 PM      Electronically signed by:  Charisma Barnes MD 2025 5:49 PM          
Cardiology  Progress Note      SUBJECTIVE:  No chest pain. No dyspnea at rest    Current Inpatient Medications  Current Facility-Administered Medications: aspirin chewable tablet 81 mg, 81 mg, Oral, Daily  polyethylene glycol (GLYCOLAX) packet 17 g, 17 g, Oral, Daily  senna (SENOKOT) tablet 8.6 mg, 1 tablet, Oral, BID  sulfur hexafluoride microspheres (LUMASON) 60.7-25 MG injection 2 mL, 2 mL, IntraVENous, ONCE PRN  traMADol (ULTRAM) tablet 50 mg, 50 mg, Oral, Q6H PRN  sodium chloride flush 0.9 % injection 5-40 mL, 5-40 mL, IntraVENous, 2 times per day  sodium chloride flush 0.9 % injection 5-40 mL, 5-40 mL, IntraVENous, PRN  enoxaparin (LOVENOX) injection 40 mg, 40 mg, SubCUTAneous, Daily  acetaminophen (TYLENOL) tablet 650 mg, 650 mg, Oral, Q6H PRN **OR** acetaminophen (TYLENOL) suppository 650 mg, 650 mg, Rectal, Q6H PRN  ipratropium 0.5 mg-albuterol 2.5 mg (DUONEB) nebulizer solution 1 Dose, 1 Dose, Inhalation, Q4H PRN  levoFLOXacin (LEVAQUIN) 750 MG/150ML infusion 750 mg, 750 mg, IntraVENous, Q24H  morphine (PF) injection 2 mg, 2 mg, IntraVENous, Q4H PRN **OR** morphine injection 4 mg, 4 mg, IntraVENous, Q4H PRN  atorvastatin (LIPITOR) tablet 80 mg, 80 mg, Oral, Nightly  carvedilol (COREG) tablet 3.125 mg, 3.125 mg, Oral, BID with meals  empagliflozin (JARDIANCE) tablet 10 mg, 10 mg, Oral, Daily  ferrous sulfate (IRON 325) tablet 325 mg, 325 mg, Oral, Daily with breakfast  folic acid (FOLVITE) tablet 1,000 mcg, 1,000 mcg, Oral, Daily  gabapentin (NEURONTIN) capsule 300 mg, 300 mg, Oral, BID  losartan (COZAAR) tablet 100 mg, 100 mg, Oral, Daily  pantoprazole (PROTONIX) tablet 40 mg, 40 mg, Oral, Daily  sodium bicarbonate tablet 1,300 mg, 1,300 mg, Oral, BID  sodium chloride tablet 1 g, 1 g, Oral, TID WC  tamsulosin (FLOMAX) capsule 0.4 mg, 0.4 mg, Oral, Nightly  ascorbic acid (VITAMIN C) tablet 500 mg, 500 mg, Oral, Daily  insulin lispro (HUMALOG,ADMELOG) injection vial 0-4 Units, 0-4 Units, SubCUTAneous, 4x 
Cardiology  Progress Note      SUBJECTIVE:  No chest pain. No dyspnea.  No acute distress.     Current Inpatient Medications  Current Facility-Administered Medications: polyethylene glycol (GLYCOLAX) packet 17 g, 17 g, Oral, Daily  senna (SENOKOT) tablet 8.6 mg, 1 tablet, Oral, BID  lactulose (CHRONULAC) 10 GM/15ML solution 20 g, 20 g, Oral, TID  sulfur hexafluoride microspheres (LUMASON) 60.7-25 MG injection 2 mL, 2 mL, IntraVENous, ONCE PRN  traMADol (ULTRAM) tablet 50 mg, 50 mg, Oral, Q6H PRN  sodium chloride flush 0.9 % injection 5-40 mL, 5-40 mL, IntraVENous, 2 times per day  sodium chloride flush 0.9 % injection 5-40 mL, 5-40 mL, IntraVENous, PRN  enoxaparin (LOVENOX) injection 40 mg, 40 mg, SubCUTAneous, Daily  acetaminophen (TYLENOL) tablet 650 mg, 650 mg, Oral, Q6H PRN **OR** acetaminophen (TYLENOL) suppository 650 mg, 650 mg, Rectal, Q6H PRN  ipratropium 0.5 mg-albuterol 2.5 mg (DUONEB) nebulizer solution 1 Dose, 1 Dose, Inhalation, Q4H PRN  levoFLOXacin (LEVAQUIN) 750 MG/150ML infusion 750 mg, 750 mg, IntraVENous, Q24H  morphine (PF) injection 2 mg, 2 mg, IntraVENous, Q4H PRN **OR** morphine injection 4 mg, 4 mg, IntraVENous, Q4H PRN  atorvastatin (LIPITOR) tablet 80 mg, 80 mg, Oral, Nightly  carvedilol (COREG) tablet 3.125 mg, 3.125 mg, Oral, BID with meals  empagliflozin (JARDIANCE) tablet 10 mg, 10 mg, Oral, Daily  ferrous sulfate (IRON 325) tablet 325 mg, 325 mg, Oral, Daily with breakfast  folic acid (FOLVITE) tablet 1,000 mcg, 1,000 mcg, Oral, Daily  gabapentin (NEURONTIN) capsule 300 mg, 300 mg, Oral, BID  losartan (COZAAR) tablet 100 mg, 100 mg, Oral, Daily  pantoprazole (PROTONIX) tablet 40 mg, 40 mg, Oral, Daily  sodium bicarbonate tablet 1,300 mg, 1,300 mg, Oral, BID  sodium chloride tablet 1 g, 1 g, Oral, TID WC  tamsulosin (FLOMAX) capsule 0.4 mg, 0.4 mg, Oral, Nightly  ascorbic acid (VITAMIN C) tablet 500 mg, 500 mg, Oral, Daily  insulin lispro (HUMALOG,ADMELOG) injection vial 0-4 Units, 0-4 
Cardiology  Progress Note      SUBJECTIVE:  No chest pain. No dyspnea.  No acute distress.     Current Inpatient Medications  Current Facility-Administered Medications: polyethylene glycol (GLYCOLAX) packet 17 g, 17 g, Oral, Daily  senna (SENOKOT) tablet 8.6 mg, 1 tablet, Oral, BID  lactulose (CHRONULAC) 10 GM/15ML solution 20 g, 20 g, Oral, TID  sulfur hexafluoride microspheres (LUMASON) 60.7-25 MG injection 2 mL, 2 mL, IntraVENous, ONCE PRN  traMADol (ULTRAM) tablet 50 mg, 50 mg, Oral, Q6H PRN  sodium chloride flush 0.9 % injection 5-40 mL, 5-40 mL, IntraVENous, 2 times per day  sodium chloride flush 0.9 % injection 5-40 mL, 5-40 mL, IntraVENous, PRN  enoxaparin (LOVENOX) injection 40 mg, 40 mg, SubCUTAneous, Daily  acetaminophen (TYLENOL) tablet 650 mg, 650 mg, Oral, Q6H PRN **OR** acetaminophen (TYLENOL) suppository 650 mg, 650 mg, Rectal, Q6H PRN  ipratropium 0.5 mg-albuterol 2.5 mg (DUONEB) nebulizer solution 1 Dose, 1 Dose, Inhalation, Q4H PRN  levoFLOXacin (LEVAQUIN) 750 MG/150ML infusion 750 mg, 750 mg, IntraVENous, Q24H  morphine (PF) injection 2 mg, 2 mg, IntraVENous, Q4H PRN **OR** morphine injection 4 mg, 4 mg, IntraVENous, Q4H PRN  atorvastatin (LIPITOR) tablet 80 mg, 80 mg, Oral, Nightly  carvedilol (COREG) tablet 3.125 mg, 3.125 mg, Oral, BID with meals  empagliflozin (JARDIANCE) tablet 10 mg, 10 mg, Oral, Daily  ferrous sulfate (IRON 325) tablet 325 mg, 325 mg, Oral, Daily with breakfast  folic acid (FOLVITE) tablet 1,000 mcg, 1,000 mcg, Oral, Daily  gabapentin (NEURONTIN) capsule 300 mg, 300 mg, Oral, BID  losartan (COZAAR) tablet 100 mg, 100 mg, Oral, Daily  pantoprazole (PROTONIX) tablet 40 mg, 40 mg, Oral, Daily  sodium bicarbonate tablet 1,300 mg, 1,300 mg, Oral, BID  sodium chloride tablet 1 g, 1 g, Oral, TID WC  tamsulosin (FLOMAX) capsule 0.4 mg, 0.4 mg, Oral, Nightly  ascorbic acid (VITAMIN C) tablet 500 mg, 500 mg, Oral, Daily  insulin lispro (HUMALOG,ADMELOG) injection vial 0-4 Units, 0-4 
Comprehensive Nutrition Assessment    Type and Reason for Visit:  Initial, Positive nutrition screen (MST 3)    Nutrition Recommendations/Plan:   Continue NPO   Continue to monitor nutrition progression and provide recommendations as indicated/medically appropriate  Consult RD as needed      Malnutrition Assessment:  Malnutrition Status:  Moderate malnutrition (02/18/25 1318)    Context:  Chronic Illness     Findings of the 6 clinical characteristics of malnutrition:  Energy Intake:  75% or less estimated energy requirements for 1 month or longer  Weight Loss:  5% over 1 month (-22% in one month)     Body Fat Loss:   (moderate) Orbital, Triceps   Muscle Mass Loss:   (moderate) Temples (temporalis), Clavicles (pectoralis & deltoids)  Fluid Accumulation:  Mild Extremities   Strength:  Not Performed    Nutrition Assessment:    Pt admit w/ SBO, R-inguinal Hernia, Covid, PNA. Pt was admitted for prostate abscess, chronic scrotal edema, diarrhea and urinary retentionone month ago. Pt needs TURP for prostate abscess however he did not f/up after discharge as instructed. Pt w/ extensive cardiac issues and was referred to  but refused to go. PMHx: CAD, Chronic Hyponatremia, T2DM, CVA w/ left hemiparesis, HTN/HLD, ETOH, Foot ulcer w/ osteomyelitis. Pt is NPO. Will continue to monitor nutrition progression and provide recommendations as indicated/medically appropriate, f/up per policy.    Nutrition Related Findings:    A/O x4, I/O -1620 since admit, abd wdl, +BS x4, Labs noted Wound Type: Open Wounds (prostate abscess, R-heel wound)       Current Nutrition Intake & Therapies:    Average Meal Intake: NPO  Average Supplements Intake: NPO  Diet NPO    Anthropometric Measures:  Height: 177.8 cm (5' 10\")  Ideal Body Weight (IBW): 166 lbs (75 kg)    Admission Body Weight: 67.1 kg (147 lb 14.9 oz) (02/16/25 stated)  Current Body Weight: 67.1 kg (147 lb 14.9 oz), 89.1 % IBW. Weight Source: Stated (02/17/25)  Current BMI (kg/m2): 
Comprehensive Nutrition Assessment    Type and Reason for Visit:  Reassess    Nutrition Recommendations/Plan:   Continue Current Diet  Begin Dallin BID for wounds  Begin ONS once daily to optimize nutrition status  Consult RD as needed      Malnutrition Assessment:  Malnutrition Status:  Moderate malnutrition (02/18/25 1318)    Context:  Chronic Illness     Findings of the 6 clinical characteristics of malnutrition:  Energy Intake:  75% or less estimated energy requirements for 1 month or longer  Weight Loss:  5% over 1 month (-22% in one month)     Body Fat Loss:   (moderate) Orbital, Triceps   Muscle Mass Loss:   (moderate) Temples (temporalis), Clavicles (pectoralis & deltoids)  Fluid Accumulation:  Mild Extremities   Strength:  Not Performed    Nutrition Assessment:    Pt admit w/ SBO, R-inguinal Hernia, Covid, PNA. Pt was admitted for prostate abscess, chronic scrotal edema, diarrhea and urinary retentionone month ago. Pt needs TURP for prostate abscess however he did not f/up after discharge as instructed. Pt w/ extensive cardiac issues and was referred to  but refused to go. PMHx: CAD, Chronic Hyponatremia, T2DM, CVA w/ left hemiparesis, HTN/HLD, ETOH, Foot ulcer w/ osteomyelitis. Pt is NPO. Will continue to monitor nutrition progression and provide recommendations as indicated/medically appropriate, f/up per policy. 02/22/25: F/up: Pt continues w/ large R-inguinal hernia. Cardiac Clearance was needed for surgery. Per Cardiology pt needs cardiac cath and possible CABG. Hernia Surgery is on hold. Pt's diet advanced to regular. Pt is tolerating w/ po intake 50-75%. Will provide ONS to optimize nutrition status, Dallin BID for wounds, continue inpatient monitoring, f/up per policy.    Nutrition Related Findings:    A/O x4, I/O -3400 since admit, abd wdl, +BS x4, e'radha wnl Wound Type: Open Wounds (prostate abscess, R-heel wound)       Current Nutrition Intake & Therapies:    Average Meal Intake: 
Date:2025  Patient Name: Jerome Steel  MRN: [id number]  : 1960  ROOM #: [id number]    Occupational Therapy order received, chart reviewed and evaluation attempted this date. Patient is unavailable for OT evaluation due to transport taking patient to [facility].     Will attempt OT evaluation at a later time. Thank you.   Belen Simms OTR/L #[id number]       
Date:2025  Patient Name: Jerome Steel  MRN: [id number]  : 1960  ROOM #: [id number]    Occupational Therapy order received, chart reviewed and evaluation attempted this date. Patient is unavailable for OT evaluation this morning, nursing is requesting the patient be seen after he returns from his stress test and echo.     Will attempt OT evaluation at a later time. Thank you.   Belen Simms OTR/L #[id number]       
Department of Podiatry  Progress Note    SUBJECTIVE:  Jerome Steel is seen at bedside for right heel ulceration. No acute events overnight. Patient denies any N/V/D/F/C/SOB/CP. Patient states no new pain or sensation to either lower extremity. No other pedal complaints at this time.     OBJECTIVE:    Scheduled Meds:   losartan  25 mg Oral Daily    sodium chloride flush  5-40 mL IntraVENous 2 times per day    aspirin  81 mg Oral Daily    polyethylene glycol  17 g Oral Daily    senna  1 tablet Oral BID    sodium chloride flush  5-40 mL IntraVENous 2 times per day    enoxaparin  40 mg SubCUTAneous Daily    atorvastatin  80 mg Oral Nightly    carvedilol  3.125 mg Oral BID with meals    empagliflozin  10 mg Oral Daily    ferrous sulfate  325 mg Oral Daily with breakfast    folic acid  1,000 mcg Oral Daily    gabapentin  300 mg Oral BID    pantoprazole  40 mg Oral Daily    sodium bicarbonate  1,300 mg Oral BID    sodium chloride  1 g Oral TID WC    tamsulosin  0.4 mg Oral Nightly    vitamin C  500 mg Oral Daily    insulin lispro  0-4 Units SubCUTAneous 4x Daily AC & HS     Continuous Infusions:   sodium chloride      dextrose       PRN Meds:.sodium chloride flush, sodium chloride, acetaminophen, sulfur hexafluoride microspheres, traMADol, sodium chloride flush, acetaminophen **OR** acetaminophen, ipratropium 0.5 mg-albuterol 2.5 mg, morphine **OR** morphine, glucose, dextrose bolus **OR** dextrose bolus, glucagon (rDNA), dextrose    Allergies   Allergen Reactions    Pcn [Penicillins] Anaphylaxis       BP (!) 117/59   Pulse 61   Temp 97.7 °F (36.5 °C)   Resp 18   Ht 1.778 m (5' 10\")   Wt 67.1 kg (148 lb)   SpO2 95%   BMI 21.24 kg/m²       EXAM:  Dressing clean and intact. CFT < 5 seconds to all digits B/L. No changes to left lower extremity from previous exam.    Previous Exam:    VASCULAR:  DP and PT pulses are palpable but diminished B/L, 1/4. CFT < 5 seconds B/L.  Warm to lukewarm from the tibial tuberosity 
Department of Podiatry  Progress Note    SUBJECTIVE:  Jerome Steel is seen at bedside for right heel ulceration. No acute events overnight. Patient states no new pain or sensation to the B/L lower extremity. Patient denies any N/V/D/F/C/SOB/CP. No other pedal complaints at this time.     OBJECTIVE:    Scheduled Meds:   sodium chloride flush  5-40 mL IntraVENous 2 times per day    aspirin  81 mg Oral Daily    polyethylene glycol  17 g Oral Daily    senna  1 tablet Oral BID    sodium chloride flush  5-40 mL IntraVENous 2 times per day    enoxaparin  40 mg SubCUTAneous Daily    levofloxacin  750 mg IntraVENous Q24H    atorvastatin  80 mg Oral Nightly    carvedilol  3.125 mg Oral BID with meals    empagliflozin  10 mg Oral Daily    ferrous sulfate  325 mg Oral Daily with breakfast    folic acid  1,000 mcg Oral Daily    gabapentin  300 mg Oral BID    losartan  100 mg Oral Daily    pantoprazole  40 mg Oral Daily    sodium bicarbonate  1,300 mg Oral BID    sodium chloride  1 g Oral TID WC    tamsulosin  0.4 mg Oral Nightly    vitamin C  500 mg Oral Daily    insulin lispro  0-4 Units SubCUTAneous 4x Daily AC & HS     Continuous Infusions:   sodium chloride      dextrose       PRN Meds:.sodium chloride flush, sodium chloride, acetaminophen, sulfur hexafluoride microspheres, traMADol, sodium chloride flush, acetaminophen **OR** acetaminophen, ipratropium 0.5 mg-albuterol 2.5 mg, morphine **OR** morphine, glucose, dextrose bolus **OR** dextrose bolus, glucagon (rDNA), dextrose    Allergies   Allergen Reactions    Pcn [Penicillins] Anaphylaxis       /81   Pulse 72   Temp 97.7 °F (36.5 °C)   Resp 18   Ht 1.778 m (5' 10\")   Wt 67.1 kg (148 lb)   SpO2 96%   BMI 21.24 kg/m²       EXAM:    VASCULAR:  DP and PT pulses are palpable but diminished B/L, 1/4. CFT < 5 seconds B/L.  Warm to lukewarm from the tibial tuberosity to the distal aspect of the digits dorsally.  Varicosities noted to bilateral lower extremities   
Department of Podiatry  Progress Note    SUBJECTIVE:  Patient seen bedside for right heel ulceration.  He states he continues to have pain to the right foot.  He would like some pain medication and an offloading boot.  Explained to patient that he should remain in offloading boot.  He will go for stress test today.  He understands plan for local wound care moving forward no acute events overnight. Patient denies any N/V/D/F/C/SOB/CP and has no other pedal complaints at this time.     OBJECTIVE:    Scheduled Meds:   sodium chloride flush  5-40 mL IntraVENous 2 times per day    enoxaparin  40 mg SubCUTAneous Daily    levofloxacin  750 mg IntraVENous Q24H    atorvastatin  80 mg Oral Nightly    carvedilol  3.125 mg Oral BID with meals    empagliflozin  10 mg Oral Daily    ferrous sulfate  325 mg Oral Daily with breakfast    folic acid  1,000 mcg Oral Daily    gabapentin  300 mg Oral BID    losartan  100 mg Oral Daily    pantoprazole  40 mg Oral Daily    sodium bicarbonate  1,300 mg Oral BID    sodium chloride  1 g Oral TID WC    tamsulosin  0.4 mg Oral Nightly    vitamin C  500 mg Oral Daily    insulin lispro  0-4 Units SubCUTAneous 4x Daily AC & HS     Continuous Infusions:   dextrose       PRN Meds:.sulfur hexafluoride microspheres, traMADol, sodium chloride flush, acetaminophen **OR** acetaminophen, ipratropium 0.5 mg-albuterol 2.5 mg, morphine **OR** morphine, glucose, dextrose bolus **OR** dextrose bolus, glucagon (rDNA), dextrose    Allergies   Allergen Reactions    Pcn [Penicillins] Anaphylaxis       /71   Pulse 70   Temp 98.1 °F (36.7 °C) (Oral)   Resp 18   Ht 1.778 m (5' 10\")   Wt 67.1 kg (148 lb)   SpO2 99%   BMI 21.24 kg/m²       EXAM:    VASCULAR:  DP and PT pulses are palpable but diminished bilaterally, 1/4. CFT < 5 seconds B/L.  Warm to lukewarm From the tibial tuberosity to the distal aspect of the digits dorsally.  Varicosities noted to bilateral lower extremities     NEUROLOGIC: Gross 
Department of Podiatry  Progress Note    SUBJECTIVE:  Patient seen bedside for right heel ulceration.  Patient denies pain to the foot today.  He is resting comfortably in bed in offloading boot.  He states that he is able to tolerate offloading boot.. Patient denies any N/V/D/F/C/SOB/CP and has no other pedal complaints at this time.     OBJECTIVE:    Scheduled Meds:   aspirin  81 mg Oral Daily    polyethylene glycol  17 g Oral Daily    senna  1 tablet Oral BID    sodium chloride flush  5-40 mL IntraVENous 2 times per day    enoxaparin  40 mg SubCUTAneous Daily    levofloxacin  750 mg IntraVENous Q24H    atorvastatin  80 mg Oral Nightly    carvedilol  3.125 mg Oral BID with meals    empagliflozin  10 mg Oral Daily    ferrous sulfate  325 mg Oral Daily with breakfast    folic acid  1,000 mcg Oral Daily    gabapentin  300 mg Oral BID    losartan  100 mg Oral Daily    pantoprazole  40 mg Oral Daily    sodium bicarbonate  1,300 mg Oral BID    sodium chloride  1 g Oral TID WC    tamsulosin  0.4 mg Oral Nightly    vitamin C  500 mg Oral Daily    insulin lispro  0-4 Units SubCUTAneous 4x Daily AC & HS     Continuous Infusions:   dextrose       PRN Meds:.sulfur hexafluoride microspheres, traMADol, sodium chloride flush, acetaminophen **OR** acetaminophen, ipratropium 0.5 mg-albuterol 2.5 mg, morphine **OR** morphine, glucose, dextrose bolus **OR** dextrose bolus, glucagon (rDNA), dextrose    Allergies   Allergen Reactions    Pcn [Penicillins] Anaphylaxis       /65   Pulse 66   Temp 97.7 °F (36.5 °C) (Oral)   Resp (!) 1   Ht 1.778 m (5' 10\")   Wt 67.1 kg (148 lb)   SpO2 95%   BMI 21.24 kg/m²       EXAM:    VASCULAR:  DP and PT pulses are palpable but diminished bilaterally, 1/4. CFT < 5 seconds B/L.  Warm to lukewarm From the tibial tuberosity to the distal aspect of the digits dorsally.  Varicosities noted to bilateral lower extremities     NEUROLOGIC: Gross light touch sensation intact.  Fine point sensation 
Department of Podiatry  Progress Note    SUBJECTIVE:  Patient seen bedside for right heel ulceration. Patient relates about the same amount of pain to his foot and is going to vascular studies today. He understands plan for conservative care moving forward. Patient denies any N/V/D/F/C/SOB/CP and has no other pedal complaints at this time.     OBJECTIVE:    Scheduled Meds:   polyethylene glycol  17 g Oral Daily    senna  1 tablet Oral BID    sodium chloride flush  5-40 mL IntraVENous 2 times per day    enoxaparin  40 mg SubCUTAneous Daily    levofloxacin  750 mg IntraVENous Q24H    atorvastatin  80 mg Oral Nightly    carvedilol  3.125 mg Oral BID with meals    empagliflozin  10 mg Oral Daily    ferrous sulfate  325 mg Oral Daily with breakfast    folic acid  1,000 mcg Oral Daily    gabapentin  300 mg Oral BID    losartan  100 mg Oral Daily    pantoprazole  40 mg Oral Daily    sodium bicarbonate  1,300 mg Oral BID    sodium chloride  1 g Oral TID WC    tamsulosin  0.4 mg Oral Nightly    vitamin C  500 mg Oral Daily    insulin lispro  0-4 Units SubCUTAneous 4x Daily AC & HS     Continuous Infusions:   dextrose       PRN Meds:.sulfur hexafluoride microspheres, traMADol, sodium chloride flush, acetaminophen **OR** acetaminophen, ipratropium 0.5 mg-albuterol 2.5 mg, morphine **OR** morphine, glucose, dextrose bolus **OR** dextrose bolus, glucagon (rDNA), dextrose    Allergies   Allergen Reactions    Pcn [Penicillins] Anaphylaxis       BP (!) 111/58   Pulse 67   Temp 97.5 °F (36.4 °C) (Oral)   Resp 18   Ht 1.778 m (5' 10\")   Wt 67.1 kg (148 lb)   SpO2 94%   BMI 21.24 kg/m²       EXAM:    VASCULAR:  DP and PT pulses are palpable but diminished bilaterally, 1/4. CFT < 5 seconds B/L.  Warm to lukewarm From the tibial tuberosity to the distal aspect of the digits dorsally.  Varicosities noted to bilateral lower extremities     NEUROLOGIC: Gross light touch sensation intact.  Fine point sensation mildly diminished 
Department of Podiatry  Progress Note    SUBJECTIVE:  Patient seen bedside for right heel ulceration. Patient understands plan for conservative care moving forward. Patient denies any N/V/D/F/C/SOB/CP and has no other pedal complaints at this time.     OBJECTIVE:    Scheduled Meds:   aspirin  81 mg Oral Daily    polyethylene glycol  17 g Oral Daily    senna  1 tablet Oral BID    lactulose  20 g Oral TID    sodium chloride flush  5-40 mL IntraVENous 2 times per day    enoxaparin  40 mg SubCUTAneous Daily    levofloxacin  750 mg IntraVENous Q24H    atorvastatin  80 mg Oral Nightly    carvedilol  3.125 mg Oral BID with meals    empagliflozin  10 mg Oral Daily    ferrous sulfate  325 mg Oral Daily with breakfast    folic acid  1,000 mcg Oral Daily    gabapentin  300 mg Oral BID    losartan  100 mg Oral Daily    pantoprazole  40 mg Oral Daily    sodium bicarbonate  1,300 mg Oral BID    sodium chloride  1 g Oral TID WC    tamsulosin  0.4 mg Oral Nightly    vitamin C  500 mg Oral Daily    insulin lispro  0-4 Units SubCUTAneous 4x Daily AC & HS     Continuous Infusions:   dextrose       PRN Meds:.sulfur hexafluoride microspheres, traMADol, sodium chloride flush, acetaminophen **OR** acetaminophen, ipratropium 0.5 mg-albuterol 2.5 mg, morphine **OR** morphine, glucose, dextrose bolus **OR** dextrose bolus, glucagon (rDNA), dextrose    Allergies   Allergen Reactions    Pcn [Penicillins] Anaphylaxis       BP (!) 102/54   Pulse 62   Temp 97.4 °F (36.3 °C) (Oral)   Resp 16   Ht 1.778 m (5' 10\")   Wt 67.1 kg (148 lb)   SpO2 94%   BMI 21.24 kg/m²       EXAM:    VASCULAR:  DP and PT pulses are palpable but diminished bilaterally, 1/4. CFT < 5 seconds B/L.  Warm to lukewarm From the tibial tuberosity to the distal aspect of the digits dorsally.  Varicosities noted to bilateral lower extremities     NEUROLOGIC: Gross light touch sensation intact.  Fine point sensation mildly diminished bilaterally.      DERM: Stable dry eschar 
GENERAL SURGERY  DAILY PROGRESS NOTE  2/18/2025    Subjective:  No overnight events. Patient complains of nausea and pain at his right inguinal hernia. He was able to tolerate some oral intake yesterday.    Objective:  /64   Pulse 74   Temp 98.1 °F (36.7 °C) (Oral)   Resp 18   Ht 1.778 m (5' 10\")   Wt 67.1 kg (148 lb)   SpO2 94%   BMI 21.24 kg/m²     General Appearance:  awake, alert, oriented, in no acute distress  Skin:  Skin color, texture, turgor normal  Head/face:  NCAT  Eyes:  No gross abnormalities. Sclera nonicteric  Lungs/Chest:  Normal expansion. No respiratory distress. On room air  Heart: Warm throughout. Regular rate   Abdomen:  Soft, minimal tenderness, non distended. Reducible right inguinal hernia without skin changes   Extremities: Extremities warm to touch, pink      I have personally reviewed all relevant labs and imaging.    Assessment/Plan:  64 y.o. male with reducible right inguinal hernia without obstruction. Coronary artery disease    -cardiology consult  -advance diet as tolerated  -pain control prn  -no emergent surgical intervention planned. Should the patient need inguinal hernia repair, an open procedure would be planned    Electronically signed by Jerome Saunders DO on 2/18/2025 at 5:54 AM     Hernia is reducible and nonobstructing despite it intermittently causing him pain.  I will not offer elective surgery until patient is cleared from a cardiac standpoint  Monitor bowel function  High risk for surgical intervention.    Electronically signed by Jerome Karimi MD on 2/18/25 at 10:49 AM EST    
GENERAL SURGERY  DAILY PROGRESS NOTE  2/19/2025    Subjective:  No events overnight denies any nausea or vomiting.  Tolerating a liquid diet.  Passing flatus denies bowel movements.    Objective:  BP (!) 111/58   Pulse 67   Temp 97.5 °F (36.4 °C) (Oral)   Resp 18   Ht 1.778 m (5' 10\")   Wt 67.1 kg (148 lb)   SpO2 94%   BMI 21.24 kg/m²     Gen: alert, oriented, no apparent distress  HEENT: NCAT, anicteric  CV: RR  Pulm: nonlabored breathing on room air  Abdomen: soft, nontender, nondistended, large left inguinal hernia is not incarcerated and not obstructing  Extremities: moving all extremities, no peripheral edema  Skin: warm and dry    Assessment/Plan:  64 y.o. male massive ileoscrotal inguinal hernia    Awaiting complete cardiology recommendations.  Inguinal hernia repair can be done as an outpatient.  I discussed with patient ways to manage his large hernia at home and signs and symptoms of incarceration and strangulation.  No acute plans for surgical intervention patient will need open repair due to size.    Electronically signed by Jerome Karimi MD on 2/19/2025 at 1:14 PM   
GENERAL SURGERY  DAILY PROGRESS NOTE  2/20/2025    Subjective:  No events overnight tolerating diet without nausea or vomiting having bowel function.    Objective:  BP (!) 102/54   Pulse 62   Temp 97.4 °F (36.3 °C) (Oral)   Resp 16   Ht 1.778 m (5' 10\")   Wt 67.1 kg (148 lb)   SpO2 94%   BMI 21.24 kg/m²     Gen: alert, oriented, no apparent distress  HEENT: NCAT, anicteric  CV: RR  Pulm: nonlabored breathing on room air  Abdomen: soft, nontender, nondistended, reducible inguinal hernia, nonobstructing  Extremities: moving all extremities, no peripheral edema  Skin: warm and dry    Assessment/Plan:  64 y.o. male massive ileoscrotal nonobstructing inguinal hernia    I discussed with the patient my concerns for surgery.  Once his cardiac clearance I will plan for open inguinal hernia repair.  As he has a massive hernia he has a high risk of complication with this procedure compared to your average hernia patient.  Despite his hernia being large I do not believe it is large enough to cause abdominal compartment syndrome once hernia contents are completely reduced into the abdomen however this is one of the risk factors require complete cardiac clearance prior to intervention.    I explained to him the risk first benefits of open inguinal hernia repair, the possibility of damaging surrounding structures including the spermatic cord, chronic pain from nerve injury and urinary retention.  I also discussed with the likelihood of seroma and hematoma formation due to large size of his scrotum.    After speak with the medical service and nursing staff patient still has not received complete cardiac clearance, he did have a negative stress test which is promising.  Once cardiac clearance is obtained we will plan for open inguinal hernia repair with mesh.  This can be done inpatient or outpatient electively as he is not obstructed from his hernia.    Feel free to call with any questions    Greater than 35 minutes of time 
GENERAL SURGERY  DAILY PROGRESS NOTE  2/21/2025    Subjective:  No events overnight,    Objective:  /65   Pulse 66   Temp 97.7 °F (36.5 °C) (Oral)   Resp (!) 1   Ht 1.778 m (5' 10\")   Wt 67.1 kg (148 lb)   SpO2 95%   BMI 21.24 kg/m²     Gen: alert, oriented, no apparent distress  HEENT: NCAT, anicteric  CV: RR  Pulm: nonlabored breathing on room air  Abdomen: soft, nontender, nondistended  Large reducible left-sided inguinal hernia  Extremities: moving all extremities, no peripheral edema  Skin: warm and dry    Assessment/Plan:  64 y.o. male ileoscrotal inguinal hernia    Cardiology notes reviewed will be available for inguinal hernia repair once cardiac workup is complete  Okay for diet from surgery standpoint  Patient can follow-up outpatient  Will peripherally follow while patient is inpatient.  Feel free to call with any questions    Electronically signed by Jerome Karimi MD on 2/21/2025 at 10:01 AM   
Occupational Therapy  OCCUPATIONAL THERAPY TREATMENT NOTE    [facility]  [address] [facility]  [address] [place], [place]   OT BEDSIDE TREATMENT NOTE      Date:2025  Patient Name: Jerome Steel  MRN: [id number]  : 1960  Room: [address] [place] [address]     Evaluating OT: Belen Simms OTR/L; [id number]      Referring Provider and Specific Provider Orders/Date:      25 1230   OT eval and treat  Start:  25 1230,   End:  25 1230,   ONE TIME,   Standing Count:  1 Occurrences,   R         Charisma Barnes MD       Placement Recommendation: Subacute         Diagnosis:   1. Intestinal obstruction without gangrene due to right inguinal hernia    2. Peripheral arterial disease    3. Type 2 diabetes mellitus without complication, without long-term current use of insulin ([facility])    4. Pressure injury of deep tissue of right heel    5. Pre-operative cardiovascular examination    6. Shortness of breath         Surgery: none        Pertinent Medical History:       Past Medical History        Past Medical History:   Diagnosis Date    Abscess of prostate      Anemia      Arthritis      Atherosclerotic heart disease of native coronary artery without angina pectoris      Bilateral carpal tunnel syndrome 2016    Cerebral artery occlusion with cerebral infarction ([facility])      Chronic back pain      COPD (chronic obstructive pulmonary disease) ([facility])      Coronary artery disease involving native coronary artery of native heart without angina pectoris 2022    CVA (cerebral vascular accident) ([facility]) 2015, 2017    Diabetes mellitus ([facility])       Type 2    Diabetic foot ulcer with osteomyelitis ([facility]) 2021    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 
Patient came down to special procedures for cardiac catheterization with Dr. Duarte.       Patient tolerated procedure.  Pressure held for 15 minutes to right groin.   Dry sterile dressing of 4x4 gauze with tegaderm applied to right groin, CDI.  Educated patient to keep head down and right leg straight and flat for 4 hour. Patient voiced understanding.  Vital signs stable.       Total amount of sedation medication given during procedure: 0 mg of IV Versed and 25 mcg of IV Fentanyl    bilateral pedal pulses +1, bilateral post tibial pulses +1,  skin intact, no numbness or tingling to feet      0715 - post procedure /68 58 16 95% on RA    Patient alert and oriented X 4     nurse to nurse called, spoke with Bill RN, nurse notified of above information.  Nurse assumed post sedation vital signs    Patient transported back to the [facility].  
Patient is refusing the nasopharyngeal swab for the Respiratory Panel that is ordered.  Educated him on the importance of the test and complications of not having it done. He adamantly refused and said, \"Get me the f**k out of here! This is ridiculous!\"   
Physical Therapy  Physical Therapy    Room #:   0338/0338-02    Date: 2025       Patient Name: Jerome Steel  : 1960      MRN: [id number]     Patient unavailable for physical therapy treatment due to off floor at stress test and echo . Will attempt PT treatment tomorrow. Thank you.      Nahun Narayan  [facility]  LIC # [id number]        
Physical Therapy Initial Evaluation/Plan of Care    Room #:  0338/0338-02  Patient Name: Jerome Steel  YOB: 1960  MRN: [id number]    Date of Service: 2/17/2025     Tentative placement recommendation: Subacute Rehab  Equipment recommendation: To be determined      Evaluating Physical Therapist: Colleen Cummins, PT #[id number]      Specific Provider Orders/Date/Referring Provider :  02/16/25 2245    PT evaluation and treat  Start:  02/16/25 2245,   End:  02/16/25 2245,   ONE TIME,   Standing Count:  1 Occurrences,   R       Giovanna Apodaca DO    Admitting Diagnosis:   SBO (small bowel obstruction) ([facility]) [K56.609]  Intestinal obstruction without gangrene due to right inguinal hernia [K40.30]      lower abdominal discomfort, scrotal discomfort.  right inguinal hernia for a long time and intermittently it causes him pain.   Surgery: none  Visit Diagnoses         Codes    Intestinal obstruction without gangrene due to right inguinal hernia    -  Primary K40.30            Patient Active Problem List   Diagnosis    Type 2 diabetes mellitus without complication, without long-term current use of insulin ([facility])    Hypertension    Hemiparesis affecting left side as late effect of cerebrovascular accident (HCC)    Peripheral arterial disease (HCC)    Peripheral vascular angioplasty status    Alcohol abuse    Coronary artery disease involving native coronary artery of native heart without angina pectoris    Hyperlipidemia    Iron deficiency anemia secondary to inadequate dietary iron intake    History of MI (myocardial infarction)    Chronic bilateral low back pain without sciatica    Intracranial hemorrhage ([facility])    History of stroke    Opioid dependence with current use (HCC)    Prostate abscess    Alcoholism (HCC)    Prostatic cyst    SBO (small bowel obstruction) ([facility])        ASSESSMENT of Current Deficits Patient exhibits decreased strength, balance, and endurance impairing functional mobility, transfers, gait 
Physical Therapy Treatment Note/Plan of Care    Room #:  0338/0338-02  Patient Name: Jerome Steel  YOB: 1960  MRN: [id number]    Date of Service: 2/21/2025     Tentative placement recommendation: Subacute Rehab  Equipment recommendation: To be determined      Evaluating Physical Therapist: Colleen Cummins, PT #[id number]      Specific Provider Orders/Date/Referring Provider :  02/16/25 2245    PT evaluation and treat  Start:  02/16/25 2245,   End:  02/16/25 2245,   ONE TIME,   Standing Count:  1 Occurrences,   R       Giovanna Apodaca DO    Admitting Diagnosis:   SBO (small bowel obstruction) ([facility]) [K56.609]  Intestinal obstruction without gangrene due to right inguinal hernia [K40.30]      lower abdominal discomfort, scrotal discomfort.  right inguinal hernia for a long time and intermittently it causes him pain.   Surgery: none  Visit Diagnoses         Codes    Pressure injury of deep tissue of right heel     L89.616    Pre-operative cardiovascular examination     Z01.810    Shortness of breath     R06.02            Patient Active Problem List   Diagnosis    Type 2 diabetes mellitus without complication, without long-term current use of insulin (HCC)    Hypertension    Hemiparesis affecting left side as late effect of cerebrovascular accident (HCC)    Peripheral arterial disease    Peripheral vascular angioplasty status    Alcohol abuse    Coronary artery disease involving native coronary artery of native heart without angina pectoris    Hyperlipidemia    Iron deficiency anemia secondary to inadequate dietary iron intake    History of MI (myocardial infarction)    Chronic bilateral low back pain without sciatica    Intracranial hemorrhage (HCC)    History of stroke    Opioid dependence with current use (HCC)    Prostate abscess    Alcoholism (HCC)    Prostatic cyst    SBO (small bowel obstruction) (HCC)    Intestinal obstruction without gangrene due to right inguinal hernia    Moderate 
Physical Therapy Treatment Note/Plan of Care    Room #:  0338/0338-02  Patient Name: Jerome Steel  YOB: 1960  MRN: [id number]    Date of Service: 2/20/2025     Tentative placement recommendation: Subacute Rehab  Equipment recommendation: To be determined      Evaluating Physical Therapist: Colleen Cummins, PT #[id number]      Specific Provider Orders/Date/Referring Provider :  02/16/25 2245    PT evaluation and treat  Start:  02/16/25 2245,   End:  02/16/25 2245,   ONE TIME,   Standing Count:  1 Occurrences,   R       Giovanna Apodaca DO    Admitting Diagnosis:   SBO (small bowel obstruction) ([facility]) [K56.609]  Intestinal obstruction without gangrene due to right inguinal hernia [K40.30]      lower abdominal discomfort, scrotal discomfort.  right inguinal hernia for a long time and intermittently it causes him pain.   Surgery: none  Visit Diagnoses         Codes    Pressure injury of deep tissue of right heel     L89.616    Pre-operative cardiovascular examination     Z01.810    Shortness of breath     R06.02            Patient Active Problem List   Diagnosis    Type 2 diabetes mellitus without complication, without long-term current use of insulin (HCC)    Hypertension    Hemiparesis affecting left side as late effect of cerebrovascular accident (HCC)    Peripheral arterial disease    Peripheral vascular angioplasty status    Alcohol abuse    Coronary artery disease involving native coronary artery of native heart without angina pectoris    Hyperlipidemia    Iron deficiency anemia secondary to inadequate dietary iron intake    History of MI (myocardial infarction)    Chronic bilateral low back pain without sciatica    Intracranial hemorrhage (HCC)    History of stroke    Opioid dependence with current use (HCC)    Prostate abscess    Alcoholism (HCC)    Prostatic cyst    SBO (small bowel obstruction) (HCC)    Intestinal obstruction without gangrene due to right inguinal hernia    Moderate 
Progress Note  Date:2025       Room:0338/0338-02  Patient Name:Jerome Steel     YOB: 1960     Age:64 y.o.        Subjective    Subjective:  Symptoms:  Stable.  No shortness of breath, cough, chest pain, weakness or diarrhea.    Diet:  NPO.  He reports  nausea.    Activity level: Impaired due to weakness.       Review of Systems   Constitutional:  Positive for fatigue. Negative for appetite change, chills, fever and unexpected weight change.   HENT:  Negative for congestion, facial swelling, mouth sores, rhinorrhea and sinus pain.    Eyes:  Negative for visual disturbance.   Respiratory:  Negative for cough, chest tightness, shortness of breath and wheezing.    Cardiovascular:  Negative for chest pain and leg swelling.   Gastrointestinal:  Positive for abdominal pain and nausea. Negative for abdominal distention, blood in stool, constipation and diarrhea.   Genitourinary:  Negative for difficulty urinating, dysuria, frequency and urgency.   Musculoskeletal:  Negative for joint swelling.   Skin:  Negative for rash.   Neurological:  Negative for dizziness, syncope, weakness, light-headedness and headaches.   Psychiatric/Behavioral:  Negative for behavioral problems, confusion and hallucinations.      Objective         Vitals Last 24 Hours:  TEMPERATURE:  Temp  Av.1 °F (36.7 °C)  Min: 98 °F (36.7 °C)  Max: 98.2 °F (36.8 °C)  RESPIRATIONS RANGE: Resp  Av.7  Min: 18  Max: 20  PULSE OXIMETRY RANGE: SpO2  Av %  Min: 96 %  Max: 99 %  PULSE RANGE: Pulse  Av  Min: 53  Max: 66  BLOOD PRESSURE RANGE: Systolic (24hrs), Av , Min:105 , Max:136   ; Diastolic (24hrs), Av, Min:54, Max:64    I/O (24Hr):  No intake or output data in the 24 hours ending 25 1410  Objective:  General Appearance:  Comfortable and in no acute distress.    Vital signs: (most recent): Blood pressure 129/64, pulse 53, temperature 98 °F (36.7 °C), resp. rate 18, height 1.778 m (5' 10\"), weight 67.1 kg 
Progress Note  Date:2025       Room:0338/0338-02  Patient Name:Jerome Steel     YOB: 1960     Age:64 y.o.        Subjective    Subjective:  Symptoms:  Stable.  No shortness of breath, cough, chest pain, weakness or diarrhea.  (Has been doing well , no increase pain ).    Activity level: Impaired due to weakness.       Review of Systems   Constitutional:  Negative for appetite change, chills, fever and unexpected weight change.   HENT:  Negative for congestion, facial swelling, mouth sores, rhinorrhea and sinus pain.    Eyes:  Negative for visual disturbance.   Respiratory:  Negative for cough, chest tightness, shortness of breath and wheezing.    Cardiovascular:  Negative for chest pain and leg swelling.   Gastrointestinal:  Positive for abdominal pain. Negative for abdominal distention, blood in stool, constipation and diarrhea.        But better than before    Genitourinary:  Negative for difficulty urinating, dysuria, frequency and urgency.   Musculoskeletal:  Negative for joint swelling.   Skin:  Positive for wound. Negative for rash.        Left leg / heel pain    Neurological:  Negative for dizziness, syncope, weakness, light-headedness and headaches.   Psychiatric/Behavioral:  Negative for behavioral problems, confusion and hallucinations.      Objective         Vitals Last 24 Hours:  TEMPERATURE:  Temp  Av.9 °F (36.6 °C)  Min: 97.4 °F (36.3 °C)  Max: 98.3 °F (36.8 °C)  RESPIRATIONS RANGE: Resp  Av  Min: 16  Max: 19  PULSE OXIMETRY RANGE: SpO2  Av %  Min: 91 %  Max: 97 %  PULSE RANGE: Pulse  Av  Min: 62  Max: 78  BLOOD PRESSURE RANGE: Systolic (24hrs), Av , Min:102 , Max:112   ; Diastolic (24hrs), Av, Min:54, Max:63    I/O (24Hr):    Intake/Output Summary (Last 24 hours) at 2025 1457  Last data filed at 2025 1424  Gross per 24 hour   Intake 440 ml   Output 750 ml   Net -310 ml     Objective:  General Appearance:  Comfortable and in no acute 
Progress Note  Date:2025       Room:0338/0338-02  Patient Name:Jerome Steel     YOB: 1960     Age:64 y.o.        Subjective    Subjective:  Symptoms:  Stable.  No shortness of breath, cough, chest pain, weakness or diarrhea.  (Has been doing well , no increase pain ).    Activity level: Impaired due to weakness.       Review of Systems   Constitutional:  Negative for appetite change, chills, fever and unexpected weight change.   HENT:  Negative for congestion, facial swelling, mouth sores, rhinorrhea and sinus pain.    Eyes:  Negative for visual disturbance.   Respiratory:  Negative for cough, chest tightness, shortness of breath and wheezing.    Cardiovascular:  Negative for chest pain and leg swelling.   Gastrointestinal:  Positive for abdominal pain. Negative for abdominal distention, blood in stool, constipation and diarrhea.   Genitourinary:  Negative for difficulty urinating, dysuria, frequency and urgency.   Musculoskeletal:  Negative for joint swelling.   Skin:  Negative for rash.   Neurological:  Negative for dizziness, syncope, weakness, light-headedness and headaches.   Psychiatric/Behavioral:  Negative for behavioral problems, confusion and hallucinations.      Objective         Vitals Last 24 Hours:  TEMPERATURE:  Temp  Av °F (36.7 °C)  Min: 97.5 °F (36.4 °C)  Max: 98.4 °F (36.9 °C)  RESPIRATIONS RANGE: Resp  Av.8  Min: 16  Max: 19  PULSE OXIMETRY RANGE: SpO2  Av.3 %  Min: 94 %  Max: 99 %  PULSE RANGE: Pulse  Av.5  Min: 64  Max: 67  BLOOD PRESSURE RANGE: Systolic (24hrs), Av , Min:109 , Max:131   ; Diastolic (24hrs), Av, Min:56, Max:70    I/O (24Hr):    Intake/Output Summary (Last 24 hours) at 2025 1220  Last data filed at 2025 0937  Gross per 24 hour   Intake 660 ml   Output 750 ml   Net -90 ml     Objective:  General Appearance:  Comfortable and in no acute distress.    Vital signs: (most recent): Blood pressure (!) 111/58, pulse 67, 
Progress Note  Date:2025       Room:0338/0338-02  Patient Name:Jerome Steel     YOB: 1960     Age:64 y.o.        Subjective    Subjective:  Symptoms:  Stable.  No shortness of breath, cough, chest pain, weakness or diarrhea.  (Still complains of pain in the right lower abdomen ).    Activity level: Impaired due to weakness.       Review of Systems   Constitutional:  Positive for fatigue. Negative for appetite change, chills, fever and unexpected weight change.   HENT:  Negative for congestion, facial swelling, mouth sores, rhinorrhea and sinus pain.    Eyes:  Negative for visual disturbance.   Respiratory:  Negative for cough, chest tightness, shortness of breath and wheezing.    Cardiovascular:  Negative for chest pain and leg swelling.   Gastrointestinal:  Positive for abdominal pain. Negative for abdominal distention, blood in stool, constipation and diarrhea.   Genitourinary:  Negative for difficulty urinating, dysuria, frequency and urgency.   Musculoskeletal:  Negative for joint swelling.   Skin:  Negative for rash.   Neurological:  Negative for dizziness, syncope, weakness, light-headedness and headaches.   Psychiatric/Behavioral:  Negative for behavioral problems, confusion and hallucinations.      Objective         Vitals Last 24 Hours:  TEMPERATURE:  Temp  Av °F (37.2 °C)  Min: 98.1 °F (36.7 °C)  Max: 99.8 °F (37.7 °C)  RESPIRATIONS RANGE: Resp  Av.2  Min: 18  Max: 19  PULSE OXIMETRY RANGE: SpO2  Av.3 %  Min: 94 %  Max: 99 %  PULSE RANGE: Pulse  Av.5  Min: 69  Max: 74  BLOOD PRESSURE RANGE: Systolic (24hrs), Av , Min:108 , Max:128   ; Diastolic (24hrs), Av, Min:56, Max:71    I/O (24Hr):    Intake/Output Summary (Last 24 hours) at 2025 1348  Last data filed at 2025 0359  Gross per 24 hour   Intake --   Output 1620 ml   Net -1620 ml     Objective:  General Appearance:  Comfortable and in no acute distress.    Vital signs: (most recent): Blood 
Progress Note  Date:2025       Room:0528/0528-01  Patient Name:Jerome Steel     YOB: 1960     Age:64 y.o.        Subjective    Subjective:  Symptoms:  Stable.  No shortness of breath, cough, chest pain, weakness or diarrhea.  (Has been doing well , nervous about his heart surgery).    Diet:  Adequate intake.    Activity level: Impaired due to weakness.       Review of Systems   Constitutional:  Negative for appetite change, chills, fever and unexpected weight change.   HENT:  Negative for congestion, facial swelling, mouth sores, rhinorrhea and sinus pain.    Eyes:  Negative for visual disturbance.   Respiratory:  Negative for cough, chest tightness, shortness of breath and wheezing.    Cardiovascular:  Negative for chest pain and leg swelling.   Gastrointestinal:  Negative for abdominal distention, blood in stool, constipation and diarrhea.   Genitourinary:  Negative for difficulty urinating, dysuria, frequency and urgency.   Musculoskeletal:  Negative for joint swelling.   Skin:  Positive for wound. Negative for rash.        Left leg / heel pain    Neurological:  Negative for dizziness, syncope, weakness, light-headedness and headaches.   Psychiatric/Behavioral:  Negative for behavioral problems, confusion and hallucinations. The patient is nervous/anxious.      Objective         Vitals Last 24 Hours:  TEMPERATURE:  Temp  Av.1 °F (36.7 °C)  Min: 97.6 °F (36.4 °C)  Max: 98.5 °F (36.9 °C)  RESPIRATIONS RANGE: Resp  Av.4  Min: 18  Max: 23  PULSE OXIMETRY RANGE: SpO2  Av.2 %  Min: 96 %  Max: 99 %  PULSE RANGE: Pulse  Av.7  Min: 59  Max: 75  BLOOD PRESSURE RANGE: Systolic (24hrs), Av , Min:99 , Max:157   ; Diastolic (24hrs), Av, Min:51, Max:81    I/O (24Hr):    Intake/Output Summary (Last 24 hours) at 2025 2402  Last data filed at 2025 0655  Gross per 24 hour   Intake --   Output 510 ml   Net -510 ml     Objective:  General Appearance:  Comfortable and in no 
Progress Note  Date:2025       Room:0528/0528-01  Patient Name:Jerome Steel     YOB: 1960     Age:64 y.o.        Subjective    Subjective:  Symptoms:  Stable.  No shortness of breath, cough, chest pain, weakness or diarrhea.  (Has been doing well ,).    Diet:  Adequate intake.    Activity level: Returning to normal.       Review of Systems   Constitutional:  Negative for appetite change, chills, fever and unexpected weight change.   HENT:  Negative for congestion, facial swelling, mouth sores, rhinorrhea and sinus pain.    Eyes:  Negative for visual disturbance.   Respiratory:  Negative for cough, chest tightness, shortness of breath and wheezing.    Cardiovascular:  Negative for chest pain and leg swelling.   Gastrointestinal:  Negative for abdominal distention, blood in stool, constipation and diarrhea.   Genitourinary:  Negative for difficulty urinating, dysuria, frequency and urgency.   Musculoskeletal:  Negative for joint swelling.   Skin:  Positive for wound. Negative for rash.        Left leg / heel pain    Neurological:  Negative for dizziness, syncope, weakness, light-headedness and headaches.   Psychiatric/Behavioral:  Negative for behavioral problems, confusion and hallucinations. The patient is nervous/anxious.      Objective         Vitals Last 24 Hours:  TEMPERATURE:  Temp  Av.8 °F (36.6 °C)  Min: 97.4 °F (36.3 °C)  Max: 98.1 °F (36.7 °C)  RESPIRATIONS RANGE: Resp  Av  Min: 16  Max: 23  PULSE OXIMETRY RANGE: SpO2  Av.6 %  Min: 92 %  Max: 98 %  PULSE RANGE: Pulse  Av.1  Min: 63  Max: 75  BLOOD PRESSURE RANGE: Systolic (24hrs), Av , Min:95 , Max:122   ; Diastolic (24hrs), Av, Min:57, Max:65    I/O (24Hr):    Intake/Output Summary (Last 24 hours) at 2025 1225  Last data filed at 2025 0326  Gross per 24 hour   Intake 200 ml   Output 850 ml   Net -650 ml     Objective:  General Appearance:  Comfortable and in no acute distress.    Vital signs: 
Report given to 5th floor, patient to come there after heart cath.  
Request for records was faxed to [facility].  Received records back from [facility] and the records were then faxed to Dr Duarte at his office.  
Spiritual Health History and Assessment/Progress Note  MARQUEZ Gil    Pre-Op,  , Adjustment to illness,      Name: Jerome Steel MRN: [id number]    Age: 64 y.o.     Sex: male   Language: English   Episcopalian: Episcopalian   SBO (small bowel obstruction) ([facility])     Date: 2/20/2025                           Spiritual Assessment began in [facility] MED SURG        Referral/Consult From: Rounding   Encounter Overview/Reason: Pre-Op  Service Provided For: Patient    Yee, Belief, Meaning:   Patient has beliefs or practices that help with coping during difficult times  Family/Friends No family/friends present      Importance and Influence:  Patient has spiritual/personal beliefs that influence decisions regarding their health  Family/Friends No family/friends present    Community:  Patient feels well-supported. Support system includes: Extended family  Family/Friends No family/friends present    Assessment and Plan of Care:     Patient Interventions include: Explored spiritual coping/struggle/distress, Affirmed coping skills/support systems, and Other:  prayed  patient would have wisdom about upcoming procedures and accompanying peace about his choices.  Family/Friends Interventions include: No family/friends present    Patient Plan of Care: Spiritual Care available upon further referral  Family/Friends Plan of Care: No family/friends present    Electronically signed by Chaplain Patrick on 2/20/2025 at 1:12 PM    
accident (HCC)     LEFT SIDE NON DOMINANT FOLLOWING STROKE    Hydrocele     Hyperlipidemia     Hypertension     Hypothyroid     Inguinal hernia     MI (myocardial infarction) (HCC)     Moderate aortic regurgitation     Multiple fractures of rib involving four or more ribs     left    Obstructive and reflux uropathy     Osteomyelitis, ankle and foot ([facility])     left    Paralytic gait     Peripheral vascular angioplasty status 12/21/2021    PFO (patent foramen ovale)     Psychosis (HCC)     Schizophrenia ([facility])     Spondylosis     TIA (transient ischemic attack)     Traumatic hemorrhage of cerebrum ([facility])     Ulcer of left heel, with necrosis of bone ([facility]) 12/27/2021    Ulcer of left heel, with necrosis of muscle ([facility]) 12/21/2021    UTI (urinary tract infection)     Vitamin D deficiency          Past Surgical History:   Procedure Laterality Date    CARPAL TUNNEL RELEASE  10/01/2016    Dr. Chang    FINGER AMPUTATION      FOOT DEBRIDEMENT Left 2/16/2021    LEFT FOOT DEBRIDEMENT WITH BONE BIOPSY, WOUND VAC APPLICATION performed by Rober Harrell DPM at [facility] OR    FOOT DEBRIDEMENT Left 9/14/2022    LEFT FOOT DEBRIDEMENT INCISION AND DRAINAGE performed by Rober Harrell DPM at [facility] OR    FOOT DEBRIDEMENT Left 9/21/2022    LEFT FOOT PARTIAL CALCANECTOMY AND DEBRIDEMENT performed by Rober Harrell DPM at [facility] OR    KNEE ARTHROSCOPY      TONSILLECTOMY  1969    TRANSESOPHAGEAL ECHOCARDIOGRAM N/A 2/22/2021    TRANSESOPHAGEAL ECHOCARDIOGRAM WITH BUBBLE STUDY performed by Ana Nielsen MD at [facility] ENDOSCOPY    UPPER GASTROINTESTINAL ENDOSCOPY N/A 1/4/2021    EGD BIOPSY performed by Oscar Stephens DO at [facility] ENDOSCOPY      Precautions:  Fall Risk, inguinal hernia, hx of CVA with residual left sided weakness       Assessment of current deficits:     [x] Functional mobility  [x]ADLs  [x] Strength               []Cognition    [x] Functional transfers   [x] IADLs         [] Safety Awareness   [x]Endurance    [x] Fine Coordination        
FOLLOWING STROKE         Peripheral arterial disease 2/16/2025 Yes    Hyperlipidemia 2/16/2025 Yes    History of stroke 2/16/2025 Yes    Intestinal obstruction without gangrene due to right inguinal hernia 2/17/2025 Yes     Assessment:    Condition: In stable condition.   (Right inguinal hernia reducible , non emergent surgery ,  surgery wants to wait for cardiac clearance ,  cardiology input appreciated and pt had stress test done 2/18, showed  left ventricular stress perfusion defect that is small in size, probably previous MI no significant inducible perfusion.  But as per his records from before he had chronically occluded RCA and significant stenosis in the left main , pt going for heart cath today by dr cox , discussed with dr cox on the phone . Await heart cath results, pt agreeing to go for CABG if needed   Advance diet after heart cath     Bilateral pneumonia on ct abdomen ,  chest ct chest showed diffuse tree budding in the left lung and the right upper lobe., pt already on levaquin , strep pneumonia and legionella negative , procalcitonin within normal.  Patient denies any symptoms.    NIDDM , check ac and qhs BS and cover with sliding scale , hemoglobin A1c 5.2.    Left heel decubitus ulcer , podiatry input appreciated.  Continue dressing no surgical intervention at this point.  Arterial vascular studies did not show significant stenosis     HTN bp optimal continue coreg     H/o CVA left hemiparesis     Hyperlipid continue lipitor     Full code   Lovenox for DVT prophylaxis ).       Electronically signed by Charisma Barnes MD on 2/17/25 at 2:10 PM EST

## 2025-02-24 NOTE — CONSULTS
Department of Podiatry   Consult Note        Reason for Consult:  Following heel wound    CHIEF COMPLAINT:  Right heel decubitus ulcer    HISTORY OF PRESENT ILLNESS:    Jerome Steel is a 64 y.o. male with significant past medical history of type 2 diabetes, hypertension, PAD, and left calcanectomy. Patient was being seen by podiatry service at [facility] prior to being transferred to [facility]. Patient states that ulcer on right heel has been present for a couple weeks. Patient states that he has not experienced any changes in pain or sensation to the bilateral lower extremity in relation to pain, sensation, drainage, or odor. Patient denies any N/V/D/F/C/SOB/CP and has no other pedal complaints at this time.     Past Medical History:        Diagnosis Date    Abscess of prostate     Anemia     Arthritis     Atherosclerotic heart disease of native coronary artery without angina pectoris     Bilateral carpal tunnel syndrome 2016    Cerebral artery occlusion with cerebral infarction ([facility])     Chronic back pain     COPD (chronic obstructive pulmonary disease) ([facility])     Coronary artery disease involving native coronary artery of native heart without angina pectoris 09/16/2022    CVA (cerebral vascular accident) ([facility]) 11/2015, 2017    Diabetes mellitus ([facility])     Type 2    Diabetic foot ulcer with osteomyelitis ([facility]) 12/21/2021    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 2 diabetes mellitus, with necrosis of bone ([facility]) 12/21/2021    Disease of multiple valves of heart     Dysphagia     EtOH dependence ([facility])     Falls     GERD (gastroesophageal reflux disease)     Hemiparesis affecting left side as late effect of cerebrovascular accident ([facility])     LEFT SIDE NON DOMINANT FOLLOWING STROKE    Hydrocele     Hyperlipidemia     Hypertension     Hypothyroid     Inguinal hernia     MI (myocardial infarction) ([facility])     Moderate aortic regurgitation     Multiple fractures of rib involving four or

## 2025-02-24 NOTE — H&P
[facility] Hospitalist Group History and Physical          CHIEF COMPLAINT:  Transfer from [place] for CTS evaluation     History of Present Illness:      Informant(s) for H&P: The patient is a 64 year old male with PMX of CVA with residual left hemiparesis, DM, HTN, HLD who initially presented to [facility] for abdominal pain. He was subsequently found to have an inguinal hernia. General surgery was consulted at the time. He had no indication for urgent surgery. He does have a history of CAD and cardiology was consulted for cardiac clearance. He had LHC a few years back and records were reviewed that he was recommended to have CABG at that time. He deferred the procedure back then. A stress test was performed during this admission which showed fixed defect with no inducible ischemia. He subsequently underwent LHC  which showed RCA occlusion. He is now agreeable to CABG. Hence he was transferred to [facility] for CTS evaluation.     At the time of my examination patient has no complaints, no chest pain, breathing issues, no abdominal pain, making good bowel movements.     He is a former smoker, drinks 2-4 cans of beer daily, last drink was a week ago. Denies illicit drug use apart from remote use of marijuana.     REVIEW OF SYSTEMS:  A comprehensive review of systems was negative except for: what is in the HPI    PMH:  Past Medical History:   Diagnosis Date    Abscess of prostate     Anemia     Arthritis     Atherosclerotic heart disease of native coronary artery without angina pectoris     Bilateral carpal tunnel syndrome 2016    Cerebral artery occlusion with cerebral infarction ([facility])     Chronic back pain     COPD (chronic obstructive pulmonary disease) ([facility])     Coronary artery disease involving native coronary artery of native heart without angina pectoris 09/16/2022    CVA (cerebral vascular accident) ([facility]) 11/2015, 2017    Diabetes mellitus ([facility])     Type 2    Diabetic foot ulcer with

## 2025-02-24 NOTE — PLAN OF CARE
Problem: Chronic Conditions and Co-morbidities  Goal: Patient's chronic conditions and co-morbidity symptoms are monitored and maintained or improved  Outcome: Progressing     Problem: Discharge Planning  Goal: Discharge to home or other facility with appropriate resources  Outcome: Progressing     Problem: Safety - Adult  Goal: Free from fall injury  Outcome: Progressing     Problem: Skin/Tissue Integrity  Goal: Skin integrity remains intact  Description: 1.  Monitor for areas of redness and/or skin breakdown  2.  Assess vascular access sites hourly  3.  Every 4-6 hours minimum:  Change oxygen saturation probe site  4.  Every 4-6 hours:  If on nasal continuous positive airway pressure, respiratory therapy assess nares and determine need for appliance change or resting period  Outcome: Progressing     Problem: Pain  Goal: Verbalizes/displays adequate comfort level or baseline comfort level  Outcome: Progressing     Problem: Genitourinary - Adult  Goal: Urinary catheter remains patent  Outcome: Progressing     Problem: Infection - Adult  Goal: Absence of infection during hospitalization  Outcome: Progressing     Problem: Hematologic - Adult  Goal: Maintains hematologic stability  Outcome: Progressing

## 2025-02-24 NOTE — CONSULTS
Inpatient Cardiology Consultation      Reason for Consult: Cardiac clearance for multiple surgical procedures    Consulting Physician: Dr. Lara    Requesting Physician:  Dr. Howard     Date of Consultation: 2/24/2025    HISTORY OF PRESENT ILLNESS:   Jerome Steel  is a 64 y.o.  male known to [facility] Cardiology Dr. Haywood as inpatient only, seen 12/26/2020. Dr. Santiago as inpatient only this admission.  Dr. Matthew Velazquez @  last office visit 12/2024.     Previously refused multiple CTS referrals    Plavix held  by patient x 2 weeks prior to arrival for hernia repair but never obtained cardiac clearance     Presented to [facility] 2/16/2025 from [facility] for abdominal pain, found to have SBO d/t right inguinal hernia. Bibasilar pneumonia.  /59 HR 67 afebrile O2 sat 96%.     2/17/2025 Surgery consult: Told in 12/2024 needed surgery(R inguinal hernia) but never followed up.  He also supposed to have a TURP at some point for history of a prostatic abscess but has not made a follow-up appointment.  He has been holding his Plavix for the last 2 weeks thinking he was getting get his procedure soon but he never acquired cardiology clearance. Large R inguinal hernia that is reducible.  He has blown out the entire floor of his inguinal canal and will require an open repair, this however can be repaired on an elective basis as his bowel was not strangulated .    Cardiology consulted for cardiac clearance for hernia repair     [facility] 2/18: nonischemic with fixed defect  Received records from  - Severe CAD noted on C from 10/2023 from    TTE 2/18/2025: as mild AI moderate to severe AS mild-moderate MR and mild TR (see full report below)   [facility] 2/21/2025 questionable RCA and significant occlusion of LM (full report pending)   Transfer to [facility] 2/23/2025 for CTS consult     Labs: K3.3>> 3.8 NT pro  (no prior) BUN 9 CR 0.6 >>0.7 alk phos 175 >> 150 lipase 9, albumin 3.6>> 3.0 alk phos 150 total protein 6.2 A1c 5.2  Laterality Date    CARPAL TUNNEL RELEASE  10/01/2016    Dr. Chang    FINGER AMPUTATION      FOOT DEBRIDEMENT Left 2/16/2021    LEFT FOOT DEBRIDEMENT WITH BONE BIOPSY, WOUND VAC APPLICATION performed by Rober Harrell DPM at [facility] OR    FOOT DEBRIDEMENT Left 9/14/2022    LEFT FOOT DEBRIDEMENT INCISION AND DRAINAGE performed by Rober Harrell DPM at [facility] OR    FOOT DEBRIDEMENT Left 9/21/2022    LEFT FOOT PARTIAL CALCANECTOMY AND DEBRIDEMENT performed by Rober Harrell DPM at [facility] OR    KNEE ARTHROSCOPY      TONSILLECTOMY  1969    TRANSESOPHAGEAL ECHOCARDIOGRAM N/A 2/22/2021    TRANSESOPHAGEAL ECHOCARDIOGRAM WITH BUBBLE STUDY performed by Ana Nielsen MD at [facility] ENDOSCOPY    UPPER GASTROINTESTINAL ENDOSCOPY N/A 1/4/2021    EGD BIOPSY performed by Oscar Stephens DO at [facility] ENDOSCOPY       Medications Prior to admit:  Prior to Admission medications    Medication Sig Start Date End Date Taking? Authorizing Provider   traMADol (ULTRAM) 50 MG tablet Take 1 tablet by mouth every 8 hours as needed for Pain.    ProviderMartine MD   aluminum & magnesium hydroxide-simethicone (MYLANTA) 400-400-40 MG/5ML SUSP Take 30 mLs by mouth every 6 hours as needed    Martine Barrera MD   amLODIPine (NORVASC) 2.5 MG tablet Take 1 tablet by mouth daily    Martine Barrera MD   carboxymethylcellulose (ARTIFICIAL TEARS) 1 % ophthalmic solution 2 drops every 8 hours as needed for Dry Eyes    Martine Barrera MD   famotidine (PEPCID) 40 MG tablet Take 1 tablet by mouth daily    Martine Barrera MD   calcium carbonate (TUMS) 500 MG chewable tablet Take 2 tablets by mouth every 12 hours as needed for Heartburn    Martine Barrera MD   sennosides-docusate sodium (SENOKOT-S) 8.6-50 MG tablet Take 1 tablet by mouth in the morning and 1 tablet in the evening.    ProviderMartine MD   loperamide (IMODIUM A-D) 2 MG tablet Take 1 tablet by mouth every hour as needed for Diarrhea    Maritne Barrera MD

## 2025-02-24 NOTE — PROGRESS NOTES
4 Eyes Skin Assessment     NAME:  Jerome Steel  YOB: 1960  MEDICAL RECORD NUMBER:  [id number]    The patient is being assessed for  Admission    I agree that at least one RN has performed a thorough Head to Toe Skin Assessment on the patient. ALL assessment sites listed below have been assessed.      Areas assessed by both nurses:    Head, Face, Ears, Shoulders, Back, Chest, Arms, Elbows, Hands, Sacrum. Buttock, Coccyx, Ischium, Legs. Feet and Heels, and Under Medical Devices         Does the Patient have a Wound? Yes wound(s) were present on assessment. LDA wound assessment was Initiated and completed by RN       Maik Prevention initiated by RN: Yes  Wound Care Orders initiated by RN: Yes    Pressure Injury (Stage 3,4, Unstageable, DTI, NWPT, and Complex wounds) if present, place Wound referral order by RN under : Yes    New Ostomies, if present place, Ostomy referral order under : No     Nurse 1 eSignature: Electronically signed by Jamia Way RN on 2/24/25 at 6:16 AM EST    **SHARE this note so that the co-signing nurse can place an eSignature**    Nurse 2 eSignature: Electronically signed by Shirley Duncan RN on 2/24/25 at 6:18 AM EST

## 2025-02-24 NOTE — CONSULTS
Inpatient Cardiology Consultation      Reason for Consult:  ***    Consulting Physician: ***    Requesting Physician:  ***    Date of Consultation: 2/24/2025    HISTORY OF PRESENT ILLNESS: 63 yo known to Dr Matthew Velazquez @  last seen in office 12/2024     Admission [facility] ED 2/16/2025-2/23/2025 sent in from nursing facility for abdominal and scrotal pain CT showed large R inguinal hernia and multiple loops of small bowel possible partial SBO.  CT showed bibasilar pneumonia . Patient was admitted for surgery consult, surgery was deemed non-urgent.  Placed on ATB's for his pneumonia    [facility] 2/16/2025 Bun/Cr 9/0.6, Na 136, K+ 3.3, Alk phos 175, WBC 12.7, Hgb 11.1, Plt 390, HgbA1c 5.2, respiratory panel including COVID-19 negative, procalcitonin 0.04.    2./17/2025 Surgery consult: Told in 12/2024 needed surgery(R inguinal hernia) but never followed up.  He also supposed to have a TURP at some point for history of a prostatic abscess but has not made a follow-up appointment.  He has been holding his Plavix for the last 2 weeks thinking he was getting get his procedure soon but he never acquired cardiology clearance.   Large R inguinal hernia that is reducible.  He has blown out the entire floor of his inguinal canal and will require an open repair, this however can be repaired on an elective basis as his bowel was not strangulated .    2/18/2025 Lexiscan MPS nonischemic with a fixed defect.     Records received after patient underwent Lexiscan that showed patient had significant CAD on heart cath just a few years prior    2/18/2025 TTE read as mild AI moderate to severe AAS mild-moderate MR and mild TR(see full report below)    [facility] Dr Duarte: Total occlusion of RCA and significant occlusion of LM(full report not dictated)    2/23/2025 transferred to [facility] for CTS consult    Currently Na 137, K+ 3.8, Bun/Cr 9/0.7, Albumin 3.0, Alk Phos 150, WBC 8.2, Hgb 9.4, Plt 237.    Currently /60 HR 55 SB, afebrile, and

## 2025-02-24 NOTE — CARE COORDINATION
Pt transferred from [facility] for CTS work up. Cardio, CTS, and podiatry on board. Pt transferred for work up for possible CABG. Pt originally from [facility], alcon gallardo at [facility], they received precert which is good through 2/25. Envelope and ambulance and ambullete form in soft chart, depending therapy recommendations. No 7000/PASRR needed, pt is from [facility]. Met with pt, pt would like to return to [facility] at discharge.GINNY Morris, ANGELO  .Case Management Assessment  Initial Evaluation    Date/Time of Evaluation: 2/24/2025 1:03 PM  Assessment Completed by: GINNY Morris, ANGELO    If patient is discharged prior to next notation, then this note serves as note for discharge by case management.    Patient Name: Jerome Steel                   YOB: 1960  Diagnosis: CAD in native artery [I25.10]                   Date / Time: 2/23/2025  7:41 PM    Patient Admission Status: Inpatient   Readmission Risk (Low < 19, Mod (19-27), High > 27): Readmission Risk Score: 26.7    Current PCP: Genet Kraus, DO  PCP verified by CM? Yes    Chart Reviewed: Yes      History Provided by: Patient  Patient Orientation: Alert and Oriented (transfer from [facility])    Patient Cognition: Alert    Hospitalization in the last 30 days (Readmission):  No    If yes, Readmission Assessment in CM Navigator will be completed.    Advance Directives:      Code Status: Full Code   Patient's Primary Decision Maker is: Legal Next of Kin    Primary Decision Maker: Yimi Steel - Brother/Sister - [phone]    Secondary Decision Maker: miguelina julian - [place] - [phone]    Discharge Planning:    Patient lives with:   Type of Home:    Primary Care Giver: Other (Comment) (at [facility])  Patient Support Systems include: Family Members, Friends/Neighbors   Current Financial resources:    Current community resources:    Current services prior to admission:              Current DME:

## 2025-02-24 NOTE — CONSULTS
I have personally spent more than 51% of the total time involved in performing this consultation.   I have personally and separately seen and evaluated the patient.  I personally and separately obtained the history and performed the physical exam.  I personally reviewed all of the above labs, imaging, history, past medical history, social history, family history, surgical history, medications, review of systems, and data.  I reviewed available records.  All of the assessments and recommendations are personally from me.  I personally counseled and educated the patient and coordinated care with other healthcare professionals as needed.  I communicated the above and results to patient's family/caregiver if asked or needed.  All of the above cardiac medical decisions are personally from me.  Please see my additional contributions to the history, physical exam, assessment, and recommendations below.      History of chief complaint:  He presented to [facility] with abdominal discomfort.  He has a known hernia, was found to have small bowel obstruction, and pneumonia.  Cardiology was consulted for preop cardiac restratification.  Stress test demonstrated a fixed defect but then a cardiac catheterization was performed which demonstrated severe left main stenosis and a  of the RCA with collaterals.  An echo also reported moderately severe aortic stenosis.  He is transferred here today for CT surgery.  We are consulted here for preop cardiac risk assessment for hernia surgery.  He currently denies any chest discomfort or shortness of breath.    Review of systems:     Heart: as above   Lungs: as above   Eyes: denies changes in vision or discharge.    Ears: denies changes in hearing or pain.   Nose: denies epistaxis or masses   Throat: denies sore throat or trouble swallowing.    Neuro: denies numbness, tingling, tremors.   Skin: denies rashes or itching.   : denies hematuria, dysuria   GI: denies vomiting,

## 2025-02-25 ENCOUNTER — APPOINTMENT (OUTPATIENT)
Dept: INTERVENTIONAL RADIOLOGY/VASCULAR | Age: 65
End: 2025-02-25
Attending: INTERNAL MEDICINE
Payer: COMMERCIAL

## 2025-02-25 ENCOUNTER — APPOINTMENT (OUTPATIENT)
Dept: ULTRASOUND IMAGING | Age: 65
End: 2025-02-25
Attending: INTERNAL MEDICINE
Payer: COMMERCIAL

## 2025-02-25 PROBLEM — I73.9 PAD (PERIPHERAL ARTERY DISEASE): Status: ACTIVE | Noted: 2025-02-25

## 2025-02-25 PROBLEM — I25.83 CORONARY ARTERY DISEASE DUE TO LIPID RICH PLAQUE: Status: ACTIVE | Noted: 2025-02-24

## 2025-02-25 LAB
ALBUMIN SERPL-MCNC: 3.2 G/DL (ref 3.5–5.2)
ALP SERPL-CCNC: 167 U/L (ref 40–129)
ALT SERPL-CCNC: 14 U/L (ref 0–40)
ANION GAP SERPL CALCULATED.3IONS-SCNC: 14 MMOL/L (ref 7–16)
AST SERPL-CCNC: 25 U/L (ref 0–39)
BASOPHILS # BLD: 0.07 K/UL (ref 0–0.2)
BASOPHILS NFR BLD: 1 % (ref 0–2)
BILIRUB SERPL-MCNC: 0.5 MG/DL (ref 0–1.2)
BUN SERPL-MCNC: 15 MG/DL (ref 6–23)
CALCIUM SERPL-MCNC: 9.1 MG/DL (ref 8.6–10.2)
CHLORIDE SERPL-SCNC: 104 MMOL/L (ref 98–107)
CO2 SERPL-SCNC: 21 MMOL/L (ref 22–29)
CREAT SERPL-MCNC: 0.7 MG/DL (ref 0.7–1.2)
EOSINOPHIL # BLD: 0.37 K/UL (ref 0.05–0.5)
EOSINOPHILS RELATIVE PERCENT: 4 % (ref 0–6)
ERYTHROCYTE [DISTWIDTH] IN BLOOD BY AUTOMATED COUNT: 15.9 % (ref 11.5–15)
GFR, ESTIMATED: >90 ML/MIN/1.73M2
GLUCOSE BLD-MCNC: 113 MG/DL (ref 74–99)
GLUCOSE BLD-MCNC: 129 MG/DL (ref 74–99)
GLUCOSE BLD-MCNC: 163 MG/DL (ref 74–99)
GLUCOSE BLD-MCNC: 192 MG/DL (ref 74–99)
GLUCOSE SERPL-MCNC: 106 MG/DL (ref 74–99)
HCT VFR BLD AUTO: 31.1 % (ref 37–54)
HGB BLD-MCNC: 9.5 G/DL (ref 12.5–16.5)
IMM GRANULOCYTES # BLD AUTO: 0.05 K/UL (ref 0–0.58)
IMM GRANULOCYTES NFR BLD: 1 % (ref 0–5)
LYMPHOCYTES NFR BLD: 1.78 K/UL (ref 1.5–4)
LYMPHOCYTES RELATIVE PERCENT: 20 % (ref 20–42)
MCH RBC QN AUTO: 28.2 PG (ref 26–35)
MCHC RBC AUTO-ENTMCNC: 30.5 G/DL (ref 32–34.5)
MCV RBC AUTO: 92.3 FL (ref 80–99.9)
MONOCYTES NFR BLD: 0.48 K/UL (ref 0.1–0.95)
MONOCYTES NFR BLD: 5 % (ref 2–12)
NEUTROPHILS NFR BLD: 70 % (ref 43–80)
NEUTS SEG NFR BLD: 6.35 K/UL (ref 1.8–7.3)
PLATELET # BLD AUTO: 259 K/UL (ref 130–450)
PMV BLD AUTO: 10.2 FL (ref 7–12)
POTASSIUM SERPL-SCNC: 4.1 MMOL/L (ref 3.5–5)
PROT SERPL-MCNC: 6.4 G/DL (ref 6.4–8.3)
RBC # BLD AUTO: 3.37 M/UL (ref 3.8–5.8)
SODIUM SERPL-SCNC: 139 MMOL/L (ref 132–146)
WBC OTHER # BLD: 9.1 K/UL (ref 4.5–11.5)

## 2025-02-25 PROCEDURE — 36415 COLL VENOUS BLD VENIPUNCTURE: CPT

## 2025-02-25 PROCEDURE — 94375 RESPIRATORY FLOW VOLUME LOOP: CPT

## 2025-02-25 PROCEDURE — 51702 INSERT TEMP BLADDER CATH: CPT

## 2025-02-25 PROCEDURE — 99232 SBSQ HOSP IP/OBS MODERATE 35: CPT

## 2025-02-25 PROCEDURE — 93923 UPR/LXTR ART STDY 3+ LVLS: CPT | Performed by: SURGERY

## 2025-02-25 PROCEDURE — 93923 UPR/LXTR ART STDY 3+ LVLS: CPT

## 2025-02-25 PROCEDURE — 6370000000 HC RX 637 (ALT 250 FOR IP): Performed by: INTERNAL MEDICINE

## 2025-02-25 PROCEDURE — 80053 COMPREHEN METABOLIC PANEL: CPT

## 2025-02-25 PROCEDURE — 97165 OT EVAL LOW COMPLEX 30 MIN: CPT

## 2025-02-25 PROCEDURE — 94729 DIFFUSING CAPACITY: CPT

## 2025-02-25 PROCEDURE — 93970 EXTREMITY STUDY: CPT

## 2025-02-25 PROCEDURE — 2500000003 HC RX 250 WO HCPCS: Performed by: INTERNAL MEDICINE

## 2025-02-25 PROCEDURE — 82962 GLUCOSE BLOOD TEST: CPT

## 2025-02-25 PROCEDURE — 97161 PT EVAL LOW COMPLEX 20 MIN: CPT

## 2025-02-25 PROCEDURE — 93880 EXTRACRANIAL BILAT STUDY: CPT

## 2025-02-25 PROCEDURE — 99232 SBSQ HOSP IP/OBS MODERATE 35: CPT | Performed by: INTERNAL MEDICINE

## 2025-02-25 PROCEDURE — 99232 SBSQ HOSP IP/OBS MODERATE 35: CPT | Performed by: THORACIC SURGERY (CARDIOTHORACIC VASCULAR SURGERY)

## 2025-02-25 PROCEDURE — 99223 1ST HOSP IP/OBS HIGH 75: CPT | Performed by: STUDENT IN AN ORGANIZED HEALTH CARE EDUCATION/TRAINING PROGRAM

## 2025-02-25 PROCEDURE — 85025 COMPLETE CBC W/AUTO DIFF WBC: CPT

## 2025-02-25 PROCEDURE — 2060000000 HC ICU INTERMEDIATE R&B

## 2025-02-25 PROCEDURE — 97530 THERAPEUTIC ACTIVITIES: CPT

## 2025-02-25 RX ADMIN — INSULIN LISPRO 1 UNITS: 100 INJECTION, SOLUTION INTRAVENOUS; SUBCUTANEOUS at 16:32

## 2025-02-25 RX ADMIN — CARVEDILOL 3.12 MG: 3.12 TABLET, FILM COATED ORAL at 09:45

## 2025-02-25 RX ADMIN — TRAMADOL HYDROCHLORIDE 50 MG: 50 TABLET, COATED ORAL at 19:46

## 2025-02-25 RX ADMIN — Medication 500 MG: at 09:43

## 2025-02-25 RX ADMIN — SENNOSIDES 8.6 MG: 8.6 TABLET, FILM COATED ORAL at 20:30

## 2025-02-25 RX ADMIN — SODIUM CHLORIDE, PRESERVATIVE FREE 10 ML: 5 INJECTION INTRAVENOUS at 09:44

## 2025-02-25 RX ADMIN — LOSARTAN POTASSIUM 25 MG: 25 TABLET, FILM COATED ORAL at 09:52

## 2025-02-25 RX ADMIN — SODIUM BICARBONATE 1300 MG: 650 TABLET ORAL at 09:43

## 2025-02-25 RX ADMIN — SODIUM CHLORIDE, PRESERVATIVE FREE 10 ML: 5 INJECTION INTRAVENOUS at 20:30

## 2025-02-25 RX ADMIN — GABAPENTIN 300 MG: 300 CAPSULE ORAL at 20:30

## 2025-02-25 RX ADMIN — TAMSULOSIN HYDROCHLORIDE 0.4 MG: 0.4 CAPSULE ORAL at 20:29

## 2025-02-25 RX ADMIN — CARVEDILOL 3.12 MG: 3.12 TABLET, FILM COATED ORAL at 16:32

## 2025-02-25 RX ADMIN — TRAMADOL HYDROCHLORIDE 50 MG: 50 TABLET, COATED ORAL at 07:14

## 2025-02-25 RX ADMIN — ATORVASTATIN CALCIUM 80 MG: 80 TABLET, FILM COATED ORAL at 20:30

## 2025-02-25 RX ADMIN — ASPIRIN 81 MG CHEWABLE TABLET 81 MG: 81 TABLET CHEWABLE at 09:43

## 2025-02-25 RX ADMIN — PANTOPRAZOLE SODIUM 40 MG: 40 TABLET, DELAYED RELEASE ORAL at 09:43

## 2025-02-25 RX ADMIN — SODIUM BICARBONATE 1300 MG: 650 TABLET ORAL at 20:30

## 2025-02-25 RX ADMIN — FERROUS SULFATE TAB 325 MG (65 MG ELEMENTAL FE) 325 MG: 325 (65 FE) TAB at 09:43

## 2025-02-25 RX ADMIN — TRAMADOL HYDROCHLORIDE 50 MG: 50 TABLET, COATED ORAL at 13:49

## 2025-02-25 RX ADMIN — GABAPENTIN 300 MG: 300 CAPSULE ORAL at 09:43

## 2025-02-25 RX ADMIN — FOLIC ACID 1000 MCG: 1 TABLET ORAL at 09:43

## 2025-02-25 RX ADMIN — SODIUM CHLORIDE 1 G: 1 TABLET ORAL at 09:45

## 2025-02-25 RX ADMIN — SODIUM CHLORIDE 1 G: 1 TABLET ORAL at 16:32

## 2025-02-25 ASSESSMENT — PAIN DESCRIPTION - LOCATION
LOCATION: GENERALIZED
LOCATION: BACK;FOOT
LOCATION: BACK;FOOT
LOCATION: FOOT

## 2025-02-25 ASSESSMENT — PAIN DESCRIPTION - ORIENTATION
ORIENTATION: RIGHT;MID;LOWER
ORIENTATION: RIGHT;MID;LOWER

## 2025-02-25 ASSESSMENT — PAIN SCALES - GENERAL
PAINLEVEL_OUTOF10: 8
PAINLEVEL_OUTOF10: 7
PAINLEVEL_OUTOF10: 8

## 2025-02-25 ASSESSMENT — PAIN DESCRIPTION - DESCRIPTORS
DESCRIPTORS: DISCOMFORT;SORE;SHARP
DESCRIPTORS: ACHING;BURNING;CRAMPING
DESCRIPTORS: ACHING;DISCOMFORT;SHARP

## 2025-02-25 NOTE — PROGRESS NOTES
Department of Podiatry  Progress Note    SUBJECTIVE:  Jerome Steel is seen at bedside for right heel ulceration. No acute events overnight. Patient states that he has not noticed any changes to either lower extremity in regards to pain, sensation, odor, or drainage. Patient denies any N/V/D/F/C/SOB/CP. No other pedal complaints at this time.     OBJECTIVE:    Scheduled Meds:   sodium chloride flush  5-40 mL IntraVENous 2 times per day    enoxaparin  40 mg SubCUTAneous Daily    atorvastatin  80 mg Oral Nightly    carvedilol  3.125 mg Oral BID with meals    [Held by provider] empagliflozin  10 mg Oral Daily    ferrous sulfate  325 mg Oral Daily with breakfast    folic acid  1,000 mcg Oral Daily    gabapentin  300 mg Oral BID    pantoprazole  40 mg Oral Daily    sodium bicarbonate  1,300 mg Oral BID    sodium chloride  1 g Oral TID WC    tamsulosin  0.4 mg Oral Nightly    vitamin C  500 mg Oral Daily    insulin lispro  0-4 Units SubCUTAneous 4x Daily AC & HS    polyethylene glycol  17 g Oral Daily    senna  1 tablet Oral BID    aspirin  81 mg Oral Daily    sodium chloride flush  5-40 mL IntraVENous 2 times per day    losartan  25 mg Oral Daily     Continuous Infusions:   dextrose      sodium chloride       PRN Meds:.sodium chloride flush, acetaminophen **OR** acetaminophen, ipratropium 0.5 mg-albuterol 2.5 mg, morphine **OR** morphine, glucose, dextrose bolus **OR** dextrose bolus, glucagon (rDNA), dextrose, traMADol, sulfur hexafluoride microspheres, sodium chloride flush, sodium chloride    Allergies   Allergen Reactions    Pcn [Penicillins] Anaphylaxis       BP (!) 105/55   Pulse 71   Temp 97.3 °F (36.3 °C) (Temporal)   Resp 18   SpO2 95%       EXAM:  Dressing clean and intact. CFT < 5 seconds to all digits B/L. No changes to left lower extremity from previous exam.    Previous Exam:    VASCULAR:  DP and PT pulses are palpable but diminished B/L, 1/4. CFT < 5 seconds B/L.  Warm to lukewarm from the tibial  duplex vein mapping lower bilateral    (Results Pending)   Vascular upper extremity arterial segmental pressures with PPG    (Results Pending)     BP (!) 105/55   Pulse 71   Temp 97.3 °F (36.3 °C) (Temporal)   Resp 18   SpO2 95%     LABS:    Recent Labs     02/24/25  0614 02/25/25  0635   WBC 8.2 9.1   HGB 9.4* 9.5*   HCT 29.1* 31.1*    259        Recent Labs     02/25/25  0635      K 4.1      CO2 21*   BUN 15   CREATININE 0.7      No results for input(s): \"INR\", \"APTT\" in the last 72 hours.    Invalid input(s): \"PROT\"      ASSESSMENT:      PLAN:  - Patient was examined and evaluated. Labs, images and ancillary service notes reviewed.   - Pain Control: IV and PO  - Antibiotics per IM  - Vascular: Lower extremity arterial study had the following findings and interpretation:     FINDINGS:  Right brachial artery pressure: 132 mmHg     Left brachial artery pressure: Not obtained     Right ankle PT pressure: >255  mmHg     Right ankle DP pressure: >255  mmHg     Left ankle PT pressure: 196 mmHg     Left ankle DP pressure: >255 mmHg     Right MAHOGAYN: N/A     Left MAHOGANY: 1.5     IMPRESSION:  1.  Left abnormal calcification.     Vascular consult ordered; no interventions at this time  - X-rays: Negative for osteomyelitis or gas  - Dressing: Xeroform, DSD; daily changes. To be managed by podiatry  - POD recs: Wound site appears stable, vascular studies and imaging results as described above.  Local wound care for now.   - Offload heels at all times while in bed.  - Will continue to follow patient while they are in-house.  - Discussed patient with Dr. Julio Cesar Harrell DPM FACFAS  Fellowship-Trained Foot and Ankle Surgeon  Diplomate, American Board of Foot and Ankle Surgeons  [phone]     Blake Turk DPM (PGY-I)

## 2025-02-25 NOTE — PROGRESS NOTES
OCCUPATIONAL THERAPY INITIAL EVALUATION    [facility]  [address], [place]        Date:2025                                                  Patient Name: Jerome Steel    MRN: [id number]    : 1960    Room: 74[facility]7415      Evaluating OT: Kalee Stanton OTR/L; [id number]       Referring Provider: Fernanda Puri MD     Specific Provider Orders/Date: OT Eval and Treat 25 1245       Diagnosis: Coronary artery disease due to lipid rich plaque;  Pre-op evaluation; PAD (peripheral artery disease).    Surgery: 25 heart cath.     Pertinent Medical History:  has a past medical history of Abscess of prostate, Anemia, Arthritis, Atherosclerotic heart disease of native coronary artery without angina pectoris, Bilateral carpal tunnel syndrome, Cerebral artery occlusion with cerebral infarction ([facility]), Chronic back pain, COPD (chronic obstructive pulmonary disease) ([facility]), Coronary artery disease involving native coronary artery of native heart without angina pectoris, CVA (cerebral vascular accident) ([facility]), Diabetes mellitus ([facility]), Diabetic foot ulcer with osteomyelitis ([facility]), 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 2 diabetes mellitus, with necrosis of bone ([facility]), Disease of multiple valves of heart, Dysphagia, EtOH dependence ([facility]), Falls, GERD (gastroesophageal reflux disease), Hemiparesis affecting left side as late effect of cerebrovascular accident ([facility]), Hydrocele, Hyperlipidemia, Hypertension, Hypothyroid, Inguinal hernia, MI (myocardial infarction) ([facility]), Moderate aortic regurgitation, Multiple fractures of rib involving four or more ribs, Obstructive and reflux uropathy, Osteomyelitis, ankle and foot ([facility]), Paralytic gait, Peripheral vascular angioplasty status, PFO (patent foramen ovale), Psychosis ([facility]), Schizophrenia ([facility]), Spondylosis, TIA (transient ischemic attack), Traumatic hemorrhage of  for increased safety and independence with ADLs  * Positioning to improve skin integrity, interaction with environment and functional independence    Home Living: Pt admitted from [facility]. Pt reports being active w/ therapy   Bathroom setup: Handicap accessible.    Prior Level of Function: mod I with ADLs;  assist with IADLs. Use of ww (pt reports +1 person supervision during therapy) for functional mobility.   Driving: NA  Occupation: None reported    Pain Level: Pt c/o 0/10 pain this session; therapist facilitated repositioning techniques.  Cognition: A&O: 3/4; Follows 1-2 step directions. Emotional lability. Limited insight/recall.   Memory:  fair +   Sequencing:  fair    Problem solving:  fair -   Judgement/safety: fair -     Functional Assessment:  AM-PAC Daily Activity Raw Score: 16/24   Initial Eval Status  Date: 2/25/25 Treatment Status  Date: STGs = LTGs  Time frame: 10-14 days   Feeding Setup   Independent    Grooming Stand by Assist seated  Modified Alfred    UB Dressing Minimal Assist   To tie gown posteriorly seated EOB  Modified Alfred    LB Dressing Minimal Assist   To don socks long-sitting in bed.  Modified Alfred    Bathing Moderate Assist  Simulated seated EOB  Modified Alfred    Toileting Moderate Assist   Modified Alfred    Bed Mobility  Log Roll: NT   Supine to sit: Minimal Assist   Sit to supine: Minimal Assist   Supine to sit: Independent   Sit to supine: Independent    Functional Transfers Sit to stand:Minimal Assist   Stand to sit:Minimal Assist   Stand pivot: Minimal Assist w/ ww  Commode: NT   Sit to stand: Modified Alfred   Stand to sit: Modified Alfred   Stand pivot:  Modified Alfred   Commode:  Modified Alfred    Functional Mobility Minimal Assist   Use of ww to<>from room doorway  Modified Alfred w/ use of Appropriate AD   Balance Sitting:     Static:  Stand by Assist     Dynamic:Stand by Assist     Standing:

## 2025-02-25 NOTE — PROGRESS NOTES
PROGRESS NOTE     CARDIOLOGY    Chief complaint: Seen today for follow up, management & recommendations for coronary artery disease, aortic stenosis.    He denies chest pain or shortness of breath today.    Wt Readings from Last 3 Encounters:   02/18/25 67.1 kg (148 lb)   12/23/24 77.9 kg (171 lb 12.8 oz)   11/16/24 87.5 kg (193 lb)     Temp Readings from Last 3 Encounters:   02/25/25 (!) 96.3 °F (35.7 °C) (Temporal)   02/23/25 97.7 °F (36.5 °C)   12/31/24 98.1 °F (36.7 °C)     BP Readings from Last 3 Encounters:   02/25/25 122/66   02/23/25 (!) 117/59   12/31/24 (!) 128/50     Pulse Readings from Last 3 Encounters:   02/25/25 52   02/23/25 61   12/31/24 59         Intake/Output Summary (Last 24 hours) at 2/25/2025 0905  Last data filed at 2/24/2025 2336  Gross per 24 hour   Intake 520 ml   Output 700 ml   Net -180 ml       Recent Labs     02/23/25  0813 02/24/25  0614 02/25/25  0635   WBC 10.2 8.2 9.1   HGB 9.6* 9.4* 9.5*   HCT 29.7* 29.1* 31.1*   MCV 90.0 89.5 92.3    237 259     Recent Labs     02/23/25  0813 02/24/25  0614 02/25/25  0635    137 139   K 3.8 3.8 4.1    104 104   CO2 22 23 21*   BUN 8 9 15   CREATININE 0.8 0.7 0.7     No results for input(s): \"PROTIME\", \"INR\" in the last 72 hours.  No results for input(s): \"CKTOTAL\", \"CKMB\", \"CKMBINDEX\", \"TROPONINI\" in the last 72 hours.  No results for input(s): \"BNP\" in the last 72 hours.  Recent Labs     02/24/25  0614   CHOL 96   HDL 31*   TRIG 97     No results for input(s): \"TROPHS\" in the last 72 hours.      sodium chloride flush 0.9 % injection 5-40 mL, 2 times per day  sodium chloride flush 0.9 % injection 5-40 mL, PRN  enoxaparin (LOVENOX) injection 40 mg, Daily  acetaminophen (TYLENOL) tablet 650 mg, Q6H PRN   Or  acetaminophen (TYLENOL) suppository 650 mg, Q6H PRN  ipratropium 0.5 mg-albuterol 2.5 mg (DUONEB) nebulizer solution 1 Dose, Q4H PRN  morphine (PF) injection 2 mg, Q4H PRN   Or  morphine sulfate (PF) injection 4 mg, Q4H

## 2025-02-25 NOTE — PROGRESS NOTES
Physical Therapy  Physical Therapy Initial Assessment     Name: Jerome Steel  : 1960  MRN: [id number]      Date of Service: 2025    Evaluating PT:  Lester De León PT, DPT    Room #:  7415/7415-A  Diagnosis:  CAD in native artery [I25.10]  PMHx/PSHx:  EtOH abuse, CVA, CAD  Procedure/Surgery:  N/A  Precautions:  fall risk, L sided deficits, R heel wound, bed alarm, contact  Equipment Needs:  TBD    SUBJECTIVE:    Pt admitted from [facility]. Pt ambulated with ww PTA.    OBJECTIVE:   Initial Evaluation  Date: 25 Treatment Short Term/ Long Term   Goals   AM-PAC 6 Clicks      Was pt agreeable to Eval/treatment? yes     Does pt have pain? No pain     Bed Mobility  Rolling: NT  Supine to sit: min A  Sit to supine: min A  Scooting: min A  Rolling: mod I  Supine to sit: mod I  Sit to supine: mod I  Scooting: mod I   Transfers Sit to stand: min A  Stand to sit: min A  Stand pivot: min A with ww  Sit to stand: mod I  Stand to sit: mod I  Stand pivot: mod I with AAD   Ambulation    15'x2 with ww min A  100'+ with AAD mod I   Stair negotiation: ascended and descended  NT       Strength/ROM:   BLE grossly 3+/5  BLE AROM WFL    Balance:   Static Sitting: SBA  Dynamic Sitting: SBA  Static Standing: CGA with ww  Dynamic Standing: min A with ww    Pt is A & O x 3  Sensation:  Pt denies numbness and tingling to extremities  Edema:  unremarkable    Vitals:  SpO2 and HR were stable during session    Therapeutic Exercises:    Bed mobility: supine<>sit, cued for EOB positioning  Transfers: STS x1, cued for hand placement and postural correction  Ambulation: 15'x2 with ww  BLE AROM    Patient education  Pt educated on role of PT, importance of functional mobility during hospital stay, safety with functional mobility    Patient response to education:   Pt verbalized understanding Pt demonstrated skill Pt requires further education in this area   yes yes reinforce     ASSESSMENT:    Conditions Requiring Skilled  and patient diagnosis     Referring provider/PT Order:  Fernanda Puri MD   Diagnosis:  CAD in native artery [I25.10]  Specific instructions for next treatment:  Progress as tolerated    Current Treatment Recommendations:     [x] Strengthening to improve independence with functional mobility   [] ROM to improve independence with functional mobility   [x] Balance Training to improve static/dynamic balance and to reduce fall risk  [x] Endurance Training to improve activity tolerance during functional mobility   [x] Transfer Training to improve safety and independence with all functional transfers   [x] Gait Training to improve gait mechanics, endurance and assess need for appropriate assistive device  [] Stair Training in preparation for safe discharge home and/or into the community   [x] Positioning to prevent skin breakdown and contractures  [x] Safety and Education Training   [x] Patient/Caregiver Education   [] HEP  [] Other     PT long term treatment goals are located in above grid    Frequency of treatments: 2-5x/week x 1-2 weeks.    Time in  1041  Time out  1101    Total Treatment Time  10 minutes     Evaluation Time includes thorough review of current medical information, gathering information on past medical history/social history and prior level of function, completion of standardized testing/informal observation of tasks, assessment of data and education on plan of care and goals.    CPT codes:  [x] Low Complexity PT evaluation 94217  [] Moderate Complexity PT evaluation 70891  [] High Complexity PT evaluation 79046  [] PT Re-evaluation 68323  [] Gait training 71069 -- minutes  [] Manual therapy 91862 -- minutes  [x] Therapeutic activities 26851 10 minutes  [] Therapeutic exercises 33801 -- minutes  [] Neuromuscular reeducation 04064 -- minutes     Lester De León, PT, DPT  [id number]

## 2025-02-25 NOTE — CARE COORDINATION
Reviewed chart, CTS consulted, pt for CABG work up. LYRIC and heart cath 2/26. Current plan is to return to [facility], will need new precert closer to discharge. Envelope and ambulance/ambullete forms in soft chart.Aretha Mireles, MSW, LSW

## 2025-02-25 NOTE — PROGRESS NOTES
[facility] Hospitalist Progress Note    Admitting Date and Time: 2/23/2025  7:41 PM  Admit Dx: CAD in native artery [I25.10]    Synopsis:  64 year old male with PMX of CVA with residual left hemiparesis, DM, HTN, HLD who initially presented to [facility] for abdominal pain. He was subsequently found to have an inguinal hernia. General surgery was consulted at the time. He had no indication for urgent surgery. He does have a history of CAD and cardiology was consulted for cardiac clearance.  He had LHC a few years back and records were reviewed that he was recommended to have CABG at that time. He deferred the procedure back then. A stress test was performed during this admission which showed fixed defect with no inducible ischemia. He subsequently underwent LHC which showed RCA occlusion. He is now agreeable to CABG. Hence he was transferred to [facility] [place].     Subjective:  Patient is being followed for CAD in native artery [I25.10]     Patient was seen at bedside..   Denies any complaint  No acute overnight event    ROS: denies fever, chills, cp, sob, n/v, HA unless stated above.      sodium chloride flush  5-40 mL IntraVENous 2 times per day    enoxaparin  40 mg SubCUTAneous Daily    atorvastatin  80 mg Oral Nightly    carvedilol  3.125 mg Oral BID with meals    [Held by provider] empagliflozin  10 mg Oral Daily    ferrous sulfate  325 mg Oral Daily with breakfast    folic acid  1,000 mcg Oral Daily    gabapentin  300 mg Oral BID    pantoprazole  40 mg Oral Daily    sodium bicarbonate  1,300 mg Oral BID    sodium chloride  1 g Oral TID WC    tamsulosin  0.4 mg Oral Nightly    vitamin C  500 mg Oral Daily    insulin lispro  0-4 Units SubCUTAneous 4x Daily AC & HS    polyethylene glycol  17 g Oral Daily    senna  1 tablet Oral BID    aspirin  81 mg Oral Daily    sodium chloride flush  5-40 mL IntraVENous 2 times per day    losartan  25 mg Oral Daily     sodium chloride flush, 5-40  mL, PRN  acetaminophen, 650 mg, Q6H PRN   Or  acetaminophen, 650 mg, Q6H PRN  ipratropium 0.5 mg-albuterol 2.5 mg, 1 Dose, Q4H PRN  morphine, 2 mg, Q4H PRN   Or  morphine, 4 mg, Q4H PRN  glucose, 4 tablet, PRN  dextrose bolus, 125 mL, PRN   Or  dextrose bolus, 250 mL, PRN  glucagon (rDNA), 1 mg, PRN  dextrose, , Continuous PRN  traMADol, 50 mg, Q6H PRN  sulfur hexafluoride microspheres, 2 mL, ONCE PRN  sodium chloride flush, 5-40 mL, PRN  sodium chloride, , PRN         Objective:    /62   Pulse 86   Temp 97.6 °F (36.4 °C) (Temporal)   Resp 18   Ht 1.778 m (5' 10\")   Wt 67 kg (147 lb 11.3 oz)   SpO2 99%   BMI 21.19 kg/m²     General Appearance: alert and oriented to person, place and time and in no acute distress  Skin: warm and dry  Head: normocephalic and atraumatic  Eyes: pupils equal, round, and reactive to light, extraocular eye movements intact, conjunctivae normal  Neck: neck supple and non tender without mass   Pulmonary/Chest: clear to auscultation bilaterally- no wheezes, rales or rhonchi, normal air movement, no respiratory distress  Cardiovascular: normal rate, normal S1 and S2 and no carotid bruits  Abdomen: soft, non-tender, non-distended, normal bowel sounds, no masses or organomegaly  Extremities: no cyanosis, no clubbing and no edema  Neurologic: left side 1/5 no cranial nerve deficit and speech normal         Recent Labs     02/23/25  0813 02/24/25  0614 02/25/25  0635    137 139   K 3.8 3.8 4.1    104 104   CO2 22 23 21*   BUN 8 9 15   CREATININE 0.8 0.7 0.7   GLUCOSE 93 104* 106*   CALCIUM 8.7 9.0 9.1       Recent Labs     02/23/25  0813 02/24/25  0614 02/25/25  0635   WBC 10.2 8.2 9.1   RBC 3.30* 3.25* 3.37*   HGB 9.6* 9.4* 9.5*   HCT 29.7* 29.1* 31.1*   MCV 90.0 89.5 92.3   MCH 29.1 28.9 28.2   MCHC 32.3 32.3 30.5*   RDW 16.0* 16.0* 15.9*    237 259   MPV 10.0 9.9 10.2       Radiology: Reviewed    Assessment and plan:    CAD, - stress test with fixed inducible

## 2025-02-25 NOTE — PROGRESS NOTES
by PCR Not Detected     Chlamydia pneumoniae By PCR Not Detected     Mycoplasma pneumo by PCR Not Detected     Comment: Performed by multiplexed nucleic acid assay.       Legionella antigen, urine [[id number]] Collected: 02/16/25 2245    Order Status: Completed Specimen: Urine Updated: 02/17/25 1011     Legionella Pneumophilia Ag, Urine NEGATIVE     Comment:       L. pneumophila serogroup 1 antigen not detected.  A negative result does not exclude infection with Leginella pnemophila serogroup 1 nor does   it rule out other microbial-caused respiratory infections of disease caused by other   serogroups of Legionella pneumophila.         Strep Pneumoniae Antigen [[id number]] Collected: 02/16/25 2245    Order Status: Completed Specimen: Urine, clean catch Updated: 02/17/25 1011     Source .URINE     Strep pneumo Ag NEGATIVE     Comment:       Presumptive Negative  suggests no current or recent pneumococcal infection. Infection due to Strep pneumoniae   cannot be ruled out since the antigen present in the sample may be below the detection limit   of the test.                   Note: Greater than or equal to 35 minutes was spent providing face-to-face patient care, including:  and coordinating care, reviewing the chart, labs, and diagnostics, as well as medical decision making. Greater than 50% of this time was spent instructing and counseling the patient face to face regarding findings and recommendations.      As above.  Work up underway.  Heart team discussion.  Baltazar Sterling MD

## 2025-02-25 NOTE — CONSULTS
day Charisma Barnes MD   10 mL at 02/24/25 2008    sodium chloride flush 0.9 % injection 5-40 mL  5-40 mL IntraVENous PRN Charisma Barnes MD        0.9 % sodium chloride infusion   IntraVENous PRN Charisma Barnes MD        losartan (COZAAR) tablet 25 mg  25 mg Oral Daily Charisma Barnes MD   25 mg at 02/24/25 0935       Past Medical History:  Past Medical History:   Diagnosis Date    Abscess of prostate     Anemia     Arthritis     Atherosclerotic heart disease of native coronary artery without angina pectoris     Bilateral carpal tunnel syndrome 2016    Cerebral artery occlusion with cerebral infarction ([facility])     Chronic back pain     COPD (chronic obstructive pulmonary disease) ([facility])     Coronary artery disease involving native coronary artery of native heart without angina pectoris 09/16/2022    CVA (cerebral vascular accident) ([facility]) 11/2015, 2017    Diabetes mellitus ([facility])     Type 2    Diabetic foot ulcer with osteomyelitis ([facility]) 12/21/2021    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 2 diabetes mellitus, with necrosis of bone ([facility]) 12/21/2021    Disease of multiple valves of heart     Dysphagia     EtOH dependence ([facility])     Falls     GERD (gastroesophageal reflux disease)     Hemiparesis affecting left side as late effect of cerebrovascular accident ([facility])     LEFT SIDE NON DOMINANT FOLLOWING STROKE    Hydrocele     Hyperlipidemia     Hypertension     Hypothyroid     Inguinal hernia     MI (myocardial infarction) ([facility])     Moderate aortic regurgitation     Multiple fractures of rib involving four or more ribs     left    Obstructive and reflux uropathy     Osteomyelitis, ankle and foot ([facility])     left    Paralytic gait     Peripheral vascular angioplasty status 12/21/2021    PFO (patent foramen ovale)     Psychosis ([facility])     Schizophrenia ([facility])     Spondylosis     TIA (transient ischemic attack)     Traumatic hemorrhage of cerebrum ([facility])     Ulcer of left heel, with necrosis of bone ([facility]) 12/27/2021  Strain (CARDIA)     Difficulty of Paying Living Expenses: Not hard at all   Food Insecurity: No Food Insecurity (12/23/2024)    Hunger Vital Sign     Worried About Running Out of Food in the Last Year: Never true     Ran Out of Food in the Last Year: Never true   Transportation Needs: No Transportation Needs (12/23/2024)    PRAPARE - Transportation     Lack of Transportation (Medical): No     Lack of Transportation (Non-Medical): No   Physical Activity: Insufficiently Active (2/6/2024)    Exercise Vital Sign     Days of Exercise per Week: 7 days     Minutes of Exercise per Session: 20 min   Stress: Not on file   Social Connections: Not on file   Intimate Partner Violence: Not on file   Housing Stability: Low Risk  (12/23/2024)    Housing Stability Vital Sign     Unable to Pay for Housing in the Last Year: No     Number of Times Moved in the Last Year: 0     Homeless in the Last Year: No       Family History:  No family history on file.    Review of Systems:  Constitutional: Denies fevers, chills, or weight loss.  HEENT: Denies visual changes or hearing loss.   Heart: As per HPI.   Lungs: Denies shortness of breath, cough, or wheezing.   Gastrointestinal: Denies nausea, vomiting, constipation, or diarrhea.   Genitourinary: Denies dysuria or hematuria.  Psychiatric: Patient denies anxiety or depression.   Neurologic: Patient denies weakness of the extremities, dizziness, or headaches.  All other ROS checked and found to be negative.    Labs:  Recent Labs     02/23/25  0813 02/24/25  0614   WBC 10.2 8.2   HGB 9.6* 9.4*   HCT 29.7* 29.1*    237      Recent Labs     02/23/25  0813 02/24/25  0614   BUN 8 9   CREATININE 0.8 0.7       Objective:  Vitals /62   Pulse 63   Temp 98.1 °F (36.7 °C) (Temporal)   Resp 16   SpO2 96%   General Appearance: Pleasant 64 y.o. year old male who appears stated age.  Communicates well, no acute distress.    HEENT: Head is normocephalic, atraumatic.  EOMs intact, PERRL.

## 2025-02-25 NOTE — PLAN OF CARE
Problem: Chronic Conditions and Co-morbidities  Goal: Patient's chronic conditions and co-morbidity symptoms are monitored and maintained or improved  Outcome: Progressing  Flowsheets (Taken 2/25/2025 0801)  Care Plan - Patient's Chronic Conditions and Co-Morbidity Symptoms are Monitored and Maintained or Improved: Monitor and assess patient's chronic conditions and comorbid symptoms for stability, deterioration, or improvement     Problem: Discharge Planning  Goal: Discharge to home or other facility with appropriate resources  Outcome: Progressing  Flowsheets (Taken 2/25/2025 0801)  Discharge to home or other facility with appropriate resources: Identify barriers to discharge with patient and caregiver     Problem: Safety - Adult  Goal: Free from fall injury  Outcome: Progressing  Flowsheets (Taken 2/25/2025 0801)  Free From Fall Injury: Instruct family/caregiver on patient safety     Problem: Skin/Tissue Integrity  Goal: Skin integrity remains intact  Description: 1.  Monitor for areas of redness and/or skin breakdown  2.  Assess vascular access sites hourly  3.  Every 4-6 hours minimum:  Change oxygen saturation probe site  4.  Every 4-6 hours:  If on nasal continuous positive airway pressure, respiratory therapy assess nares and determine need for appliance change or resting period  Outcome: Progressing  Flowsheets (Taken 2/25/2025 0801)  Skin Integrity Remains Intact: Monitor for areas of redness and/or skin breakdown     Problem: Pain  Goal: Verbalizes/displays adequate comfort level or baseline comfort level  Outcome: Progressing     Problem: Cardiovascular - Adult  Goal: Maintains optimal cardiac output and hemodynamic stability  Outcome: Progressing  Flowsheets (Taken 2/25/2025 0801)  Maintains optimal cardiac output and hemodynamic stability:   Monitor blood pressure and heart rate   Assess for signs of decreased cardiac output  Goal: Absence of cardiac dysrhythmias or at baseline  Outcome:

## 2025-02-25 NOTE — PROGRESS NOTES
Vascular Surgery Progress Note    Pt is being seen in f/u today regarding PVD    Subjective  Pt s/e.  No issues overnight.     Current Medications:    dextrose      sodium chloride        sodium chloride flush, acetaminophen **OR** acetaminophen, ipratropium 0.5 mg-albuterol 2.5 mg, morphine **OR** morphine, glucose, dextrose bolus **OR** dextrose bolus, glucagon (rDNA), dextrose, traMADol, sulfur hexafluoride microspheres, sodium chloride flush, sodium chloride    sodium chloride flush  5-40 mL IntraVENous 2 times per day    enoxaparin  40 mg SubCUTAneous Daily    atorvastatin  80 mg Oral Nightly    carvedilol  3.125 mg Oral BID with meals    [Held by provider] empagliflozin  10 mg Oral Daily    ferrous sulfate  325 mg Oral Daily with breakfast    folic acid  1,000 mcg Oral Daily    gabapentin  300 mg Oral BID    pantoprazole  40 mg Oral Daily    sodium bicarbonate  1,300 mg Oral BID    sodium chloride  1 g Oral TID WC    tamsulosin  0.4 mg Oral Nightly    vitamin C  500 mg Oral Daily    insulin lispro  0-4 Units SubCUTAneous 4x Daily AC & HS    polyethylene glycol  17 g Oral Daily    senna  1 tablet Oral BID    aspirin  81 mg Oral Daily    sodium chloride flush  5-40 mL IntraVENous 2 times per day    losartan  25 mg Oral Daily        PHYSICAL EXAM:    /66   Pulse 52   Temp (!) 96.3 °F (35.7 °C) (Temporal)   Resp 17   SpO2 96%     Intake/Output Summary (Last 24 hours) at 2/25/2025 1017  Last data filed at 2/25/2025 0801  Gross per 24 hour   Intake 520 ml   Output 700 ml   Net -180 ml          Gen: awake, alert and oriented x3, no apparent distress  CVS: RRR  Resp: No increased work of breathing  Abd: Soft, non-tender, non-distended  R LE: 1+ fem pulse. Doppler signals present in DP/PT  L LE: 1+ fem pulse. 2+ DP     LABS:    Lab Results   Component Value Date    WBC 9.1 02/25/2025    HGB 9.5 (L) 02/25/2025    HCT 31.1 (L) 02/25/2025     02/25/2025    PROTIME 15.5 (H) 02/21/2025    INR 1.4 02/21/2025

## 2025-02-25 NOTE — PROGRESS NOTES
Wound care: Podiatry following for right foot wound and managing dressing, per patients nurse no other need for wound care to see patient. Wound care will sign off re-consult if needed. Rehana Carbajal RN

## 2025-02-25 NOTE — PLAN OF CARE
Problem: Chronic Conditions and Co-morbidities  Goal: Patient's chronic conditions and co-morbidity symptoms are monitored and maintained or improved  Outcome: Progressing     Problem: Discharge Planning  Goal: Discharge to home or other facility with appropriate resources  Outcome: Progressing     Problem: Safety - Adult  Goal: Free from fall injury  Outcome: Progressing     Problem: Skin/Tissue Integrity  Goal: Skin integrity remains intact  Description: 1.  Monitor for areas of redness and/or skin breakdown  2.  Assess vascular access sites hourly  3.  Every 4-6 hours minimum:  Change oxygen saturation probe site  4.  Every 4-6 hours:  If on nasal continuous positive airway pressure, respiratory therapy assess nares and determine need for appliance change or resting period  Outcome: Progressing     Problem: Pain  Goal: Verbalizes/displays adequate comfort level or baseline comfort level  Outcome: Progressing     Problem: Cardiovascular - Adult  Goal: Maintains optimal cardiac output and hemodynamic stability  Outcome: Progressing     Problem: Cardiovascular - Adult  Goal: Absence of cardiac dysrhythmias or at baseline  Outcome: Progressing     Problem: Genitourinary - Adult  Goal: Urinary catheter remains patent  Outcome: Progressing     Problem: Infection - Adult  Goal: Absence of infection during hospitalization  Outcome: Progressing     Problem: Hematologic - Adult  Goal: Maintains hematologic stability  Outcome: Progressing

## 2025-02-26 ENCOUNTER — ANESTHESIA (OUTPATIENT)
Age: 65
End: 2025-02-26
Payer: COMMERCIAL

## 2025-02-26 ENCOUNTER — HOSPITAL ENCOUNTER (INPATIENT)
Age: 65
Discharge: HOME | End: 2025-02-28
Attending: INTERNAL MEDICINE
Payer: COMMERCIAL

## 2025-02-26 ENCOUNTER — ANESTHESIA EVENT (OUTPATIENT)
Age: 65
End: 2025-02-26
Payer: COMMERCIAL

## 2025-02-26 VITALS
OXYGEN SATURATION: 100 % | RESPIRATION RATE: 18 BRPM | SYSTOLIC BLOOD PRESSURE: 95 MMHG | HEART RATE: 68 BPM | DIASTOLIC BLOOD PRESSURE: 59 MMHG

## 2025-02-26 PROBLEM — E43 SEVERE PROTEIN-ENERGY MALNUTRITION: Status: ACTIVE | Noted: 2025-02-18

## 2025-02-26 LAB
ECHO AR MAX VEL PISA: 3.9 M/S
ECHO AV AREA PEAK VELOCITY: 1.9 CM2
ECHO AV AREA PLAN/BSA: 1.2 CM2/M2
ECHO AV AREA PLAN: 2.2 CM2
ECHO AV AREA VTI: 2 CM2
ECHO AV AREA/BSA PEAK VELOCITY: 1 CM2/M2
ECHO AV AREA/BSA VTI: 1.1 CM2/M2
ECHO AV MEAN GRADIENT: 9 MMHG
ECHO AV MEAN VELOCITY: 1.4 M/S
ECHO AV PEAK GRADIENT: 15 MMHG
ECHO AV PEAK VELOCITY: 1.9 M/S
ECHO AV REGURGITANT PHT: 900.2 MS
ECHO AV VELOCITY RATIO: 0.42
ECHO AV VTI: 42.9 CM
ECHO BSA: 1.82 M2
ECHO BSA: 1.82 M2
ECHO LVOT AREA: 4.5 CM2
ECHO LVOT AV VTI INDEX: 0.45
ECHO LVOT DIAM: 2.4 CM
ECHO LVOT MEAN GRADIENT: 2 MMHG
ECHO LVOT PEAK GRADIENT: 3 MMHG
ECHO LVOT PEAK VELOCITY: 0.8 M/S
ECHO LVOT STROKE VOLUME INDEX: 46.9 ML/M2
ECHO LVOT SV: 86.4 ML
ECHO LVOT VTI: 19.1 CM
GLUCOSE BLD-MCNC: 108 MG/DL (ref 74–99)
GLUCOSE BLD-MCNC: 110 MG/DL (ref 74–99)
GLUCOSE BLD-MCNC: 140 MG/DL (ref 74–99)
GLUCOSE BLD-MCNC: 96 MG/DL (ref 74–99)

## 2025-02-26 PROCEDURE — 93451 RIGHT HEART CATH: CPT | Performed by: INTERNAL MEDICINE

## 2025-02-26 PROCEDURE — 3700000001 HC ADD 15 MINUTES (ANESTHESIA): Performed by: INTERNAL MEDICINE

## 2025-02-26 PROCEDURE — 82962 GLUCOSE BLOOD TEST: CPT

## 2025-02-26 PROCEDURE — 6370000000 HC RX 637 (ALT 250 FOR IP): Performed by: INTERNAL MEDICINE

## 2025-02-26 PROCEDURE — 4A023N6 MEASUREMENT OF CARDIAC SAMPLING AND PRESSURE, RIGHT HEART, PERCUTANEOUS APPROACH: ICD-10-PCS | Performed by: INTERNAL MEDICINE

## 2025-02-26 PROCEDURE — 93320 DOPPLER ECHO COMPLETE: CPT

## 2025-02-26 PROCEDURE — 2500000003 HC RX 250 WO HCPCS: Performed by: INTERNAL MEDICINE

## 2025-02-26 PROCEDURE — 7100000010 HC PHASE II RECOVERY - FIRST 15 MIN: Performed by: INTERNAL MEDICINE

## 2025-02-26 PROCEDURE — 51702 INSERT TEMP BLADDER CATH: CPT

## 2025-02-26 PROCEDURE — 6360000002 HC RX W HCPCS: Performed by: INTERNAL MEDICINE

## 2025-02-26 PROCEDURE — 93325 DOPPLER ECHO COLOR FLOW MAPG: CPT | Performed by: INTERNAL MEDICINE

## 2025-02-26 PROCEDURE — C1751 CATH, INF, PER/CENT/MIDLINE: HCPCS | Performed by: INTERNAL MEDICINE

## 2025-02-26 PROCEDURE — 2709999900 HC NON-CHARGEABLE SUPPLY: Performed by: INTERNAL MEDICINE

## 2025-02-26 PROCEDURE — 2060000000 HC ICU INTERMEDIATE R&B

## 2025-02-26 PROCEDURE — 93325 DOPPLER ECHO COLOR FLOW MAPG: CPT

## 2025-02-26 PROCEDURE — 2580000003 HC RX 258

## 2025-02-26 PROCEDURE — 99232 SBSQ HOSP IP/OBS MODERATE 35: CPT

## 2025-02-26 PROCEDURE — 6360000002 HC RX W HCPCS

## 2025-02-26 PROCEDURE — 93320 DOPPLER ECHO COMPLETE: CPT | Performed by: INTERNAL MEDICINE

## 2025-02-26 PROCEDURE — C1769 GUIDE WIRE: HCPCS | Performed by: INTERNAL MEDICINE

## 2025-02-26 PROCEDURE — C1894 INTRO/SHEATH, NON-LASER: HCPCS | Performed by: INTERNAL MEDICINE

## 2025-02-26 PROCEDURE — 93312 ECHO TRANSESOPHAGEAL: CPT | Performed by: INTERNAL MEDICINE

## 2025-02-26 PROCEDURE — 3700000000 HC ANESTHESIA ATTENDED CARE: Performed by: INTERNAL MEDICINE

## 2025-02-26 PROCEDURE — 99152 MOD SED SAME PHYS/QHP 5/>YRS: CPT | Performed by: INTERNAL MEDICINE

## 2025-02-26 RX ORDER — LIDOCAINE HYDROCHLORIDE 20 MG/ML
INJECTION, SOLUTION EPIDURAL; INFILTRATION; INTRACAUDAL; PERINEURAL
Status: DISCONTINUED | OUTPATIENT
Start: 2025-02-26 | End: 2025-02-26 | Stop reason: SDUPTHER

## 2025-02-26 RX ORDER — FENTANYL CITRATE 50 UG/ML
INJECTION, SOLUTION INTRAMUSCULAR; INTRAVENOUS PRN
Status: DISCONTINUED | OUTPATIENT
Start: 2025-02-26 | End: 2025-02-26 | Stop reason: HOSPADM

## 2025-02-26 RX ORDER — MIDAZOLAM HYDROCHLORIDE 1 MG/ML
INJECTION, SOLUTION INTRAMUSCULAR; INTRAVENOUS PRN
Status: DISCONTINUED | OUTPATIENT
Start: 2025-02-26 | End: 2025-02-26 | Stop reason: HOSPADM

## 2025-02-26 RX ORDER — SODIUM CHLORIDE 9 MG/ML
INJECTION, SOLUTION INTRAVENOUS
Status: DISCONTINUED | OUTPATIENT
Start: 2025-02-26 | End: 2025-02-26 | Stop reason: SDUPTHER

## 2025-02-26 RX ORDER — PROPOFOL 10 MG/ML
INJECTION, EMULSION INTRAVENOUS
Status: DISCONTINUED | OUTPATIENT
Start: 2025-02-26 | End: 2025-02-26 | Stop reason: SDUPTHER

## 2025-02-26 RX ADMIN — TRAMADOL HYDROCHLORIDE 50 MG: 50 TABLET, COATED ORAL at 14:32

## 2025-02-26 RX ADMIN — LOSARTAN POTASSIUM 25 MG: 25 TABLET, FILM COATED ORAL at 12:15

## 2025-02-26 RX ADMIN — SENNOSIDES 8.6 MG: 8.6 TABLET, FILM COATED ORAL at 12:15

## 2025-02-26 RX ADMIN — PHENYLEPHRINE HYDROCHLORIDE 100 MCG: 10 INJECTION INTRAVENOUS at 10:12

## 2025-02-26 RX ADMIN — TRAMADOL HYDROCHLORIDE 50 MG: 50 TABLET, COATED ORAL at 07:54

## 2025-02-26 RX ADMIN — ENOXAPARIN SODIUM 40 MG: 100 INJECTION SUBCUTANEOUS at 12:15

## 2025-02-26 RX ADMIN — SODIUM CHLORIDE 1 G: 1 TABLET ORAL at 12:14

## 2025-02-26 RX ADMIN — PROPOFOL 250 MG: 10 INJECTION, EMULSION INTRAVENOUS at 10:03

## 2025-02-26 RX ADMIN — ASPIRIN 81 MG CHEWABLE TABLET 81 MG: 81 TABLET CHEWABLE at 12:14

## 2025-02-26 RX ADMIN — CARVEDILOL 3.12 MG: 3.12 TABLET, FILM COATED ORAL at 16:44

## 2025-02-26 RX ADMIN — FERROUS SULFATE TAB 325 MG (65 MG ELEMENTAL FE) 325 MG: 325 (65 FE) TAB at 12:15

## 2025-02-26 RX ADMIN — FOLIC ACID 1000 MCG: 1 TABLET ORAL at 12:15

## 2025-02-26 RX ADMIN — GABAPENTIN 300 MG: 300 CAPSULE ORAL at 12:14

## 2025-02-26 RX ADMIN — TRAMADOL HYDROCHLORIDE 50 MG: 50 TABLET, COATED ORAL at 02:01

## 2025-02-26 RX ADMIN — TAMSULOSIN HYDROCHLORIDE 0.4 MG: 0.4 CAPSULE ORAL at 20:07

## 2025-02-26 RX ADMIN — GABAPENTIN 300 MG: 300 CAPSULE ORAL at 20:08

## 2025-02-26 RX ADMIN — SODIUM CHLORIDE: 9 INJECTION, SOLUTION INTRAVENOUS at 09:43

## 2025-02-26 RX ADMIN — SODIUM CHLORIDE, PRESERVATIVE FREE 10 ML: 5 INJECTION INTRAVENOUS at 12:21

## 2025-02-26 RX ADMIN — SODIUM BICARBONATE 1300 MG: 650 TABLET ORAL at 12:15

## 2025-02-26 RX ADMIN — SODIUM CHLORIDE 1 G: 1 TABLET ORAL at 16:44

## 2025-02-26 RX ADMIN — SODIUM CHLORIDE, PRESERVATIVE FREE 5 ML: 5 INJECTION INTRAVENOUS at 12:20

## 2025-02-26 RX ADMIN — SENNOSIDES 8.6 MG: 8.6 TABLET, FILM COATED ORAL at 20:08

## 2025-02-26 RX ADMIN — ATORVASTATIN CALCIUM 80 MG: 80 TABLET, FILM COATED ORAL at 20:08

## 2025-02-26 RX ADMIN — CARVEDILOL 3.12 MG: 3.12 TABLET, FILM COATED ORAL at 12:14

## 2025-02-26 RX ADMIN — Medication 100 MG: at 10:03

## 2025-02-26 RX ADMIN — SODIUM CHLORIDE, PRESERVATIVE FREE 10 ML: 5 INJECTION INTRAVENOUS at 20:08

## 2025-02-26 RX ADMIN — SODIUM BICARBONATE 1300 MG: 650 TABLET ORAL at 20:07

## 2025-02-26 RX ADMIN — TRAMADOL HYDROCHLORIDE 50 MG: 50 TABLET, COATED ORAL at 20:07

## 2025-02-26 RX ADMIN — PANTOPRAZOLE SODIUM 40 MG: 40 TABLET, DELAYED RELEASE ORAL at 12:14

## 2025-02-26 ASSESSMENT — PAIN SCALES - GENERAL
PAINLEVEL_OUTOF10: 7
PAINLEVEL_OUTOF10: 9
PAINLEVEL_OUTOF10: 7
PAINLEVEL_OUTOF10: 8
PAINLEVEL_OUTOF10: 7
PAINLEVEL_OUTOF10: 7
PAINLEVEL_OUTOF10: 9
PAINLEVEL_OUTOF10: 5

## 2025-02-26 ASSESSMENT — PAIN DESCRIPTION - LOCATION
LOCATION: FOOT;LEG
LOCATION: GENERALIZED
LOCATION: LEG;FOOT
LOCATION: BACK;FOOT;GENERALIZED

## 2025-02-26 ASSESSMENT — PAIN SCALES - WONG BAKER
WONGBAKER_NUMERICALRESPONSE: 2;0
WONGBAKER_NUMERICALRESPONSE: 2;0

## 2025-02-26 ASSESSMENT — PAIN DESCRIPTION - DESCRIPTORS
DESCRIPTORS: ACHING
DESCRIPTORS: ACHING;STABBING;NAGGING
DESCRIPTORS: ACHING;DULL;DISCOMFORT

## 2025-02-26 ASSESSMENT — PAIN DESCRIPTION - ORIENTATION
ORIENTATION: RIGHT
ORIENTATION: LEFT;RIGHT

## 2025-02-26 NOTE — H&P
Patient has been followed by Dr. Lara.  He was referred to undergo right heart catheterization by Dr. Sterling.  H&P reviewed.  Patient seen and examined.  No changes.

## 2025-02-26 NOTE — PLAN OF CARE
Problem: Chronic Conditions and Co-morbidities  Goal: Patient's chronic conditions and co-morbidity symptoms are monitored and maintained or improved  2/25/2025 2304 by Adalberto Pelaez RN  Outcome: Progressing  2/25/2025 1445 by Agatha Man RN  Outcome: Progressing  Flowsheets (Taken 2/25/2025 0801)  Care Plan - Patient's Chronic Conditions and Co-Morbidity Symptoms are Monitored and Maintained or Improved: Monitor and assess patient's chronic conditions and comorbid symptoms for stability, deterioration, or improvement     Problem: Discharge Planning  Goal: Discharge to home or other facility with appropriate resources  2/25/2025 2304 by Adalberto Pelaez RN  Outcome: Progressing  2/25/2025 1445 by Agatha Man RN  Outcome: Progressing  Flowsheets (Taken 2/25/2025 0801)  Discharge to home or other facility with appropriate resources: Identify barriers to discharge with patient and caregiver     Problem: Safety - Adult  Goal: Free from fall injury  2/25/2025 2304 by Adalberto Pelaez RN  Outcome: Progressing  2/25/2025 1445 by Agatha Man RN  Outcome: Progressing  Flowsheets (Taken 2/25/2025 0801)  Free From Fall Injury: Instruct family/caregiver on patient safety     Problem: Skin/Tissue Integrity  Goal: Skin integrity remains intact  Description: 1.  Monitor for areas of redness and/or skin breakdown  2.  Assess vascular access sites hourly  3.  Every 4-6 hours minimum:  Change oxygen saturation probe site  4.  Every 4-6 hours:  If on nasal continuous positive airway pressure, respiratory therapy assess nares and determine need for appliance change or resting period  2/25/2025 1445 by Agatha Man RN  Outcome: Progressing  Flowsheets (Taken 2/25/2025 0801)  Skin Integrity Remains Intact: Monitor for areas of redness and/or skin breakdown     Problem: Pain  Goal: Verbalizes/displays adequate comfort level or baseline comfort level  2/25/2025 1445 by Agatha Man RN  Outcome: Progressing     Problem:  0801)  Maintains hematologic stability:   Assess for signs and symptoms of bleeding or hemorrhage   Monitor labs for bleeding or clotting disorders

## 2025-02-26 NOTE — ANESTHESIA POSTPROCEDURE EVALUATION
Department of Anesthesiology  Postprocedure Note    Patient: Jerome Steel  MRN: [id number]  YOB: 1960  Date of evaluation: 2/26/2025    Procedure Summary       Date: 02/26/25 Room / Location: [facility] Cardiac Cath Lab; [facility] Noninvasive Cardiology    Anesthesia Start: 0943 Anesthesia Stop: 1027    Procedure: LYRIC (PRN CONTRAST/BUBBLE/3D) Diagnosis:       Aortic valve stenosis, etiology of cardiac valve disease unspecified      Mitral valve insufficiency, unspecified etiology    Scheduled Providers: Ximena Arizmendi MD Responsible Provider: Ximena Arizmendi MD    Anesthesia Type: MAC ASA Status: 4            Anesthesia Type: MAC    Stephen Phase I:      Stephen Phase II:      Anesthesia Post Evaluation    Patient location during evaluation: PACU  Patient participation: complete - patient participated  Level of consciousness: awake and alert  Airway patency: patent  Nausea & Vomiting: no nausea and no vomiting  Cardiovascular status: blood pressure returned to baseline and hemodynamically stable  Respiratory status: acceptable and spontaneous ventilation  Hydration status: euvolemic  Multimodal analgesia pain management approach  Pain management: adequate    No notable events documented.

## 2025-02-26 NOTE — PROGRESS NOTES
CC: Hernia     Brief HPI:  Patient off the floor for LYRIC and RHC       Past Medical History:   Diagnosis Date    Abscess of prostate     Anemia     Arthritis     Atherosclerotic heart disease of native coronary artery without angina pectoris     Bilateral carpal tunnel syndrome 2016    Cerebral artery occlusion with cerebral infarction ([facility])     Chronic back pain     COPD (chronic obstructive pulmonary disease) ([facility])     Coronary artery disease involving native coronary artery of native heart without angina pectoris 09/16/2022    CVA (cerebral vascular accident) ([facility]) 11/2015, 2017    Diabetes mellitus ([facility])     Type 2    Diabetic foot ulcer with osteomyelitis ([facility]) 12/21/2021    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 2 diabetes mellitus, with necrosis of bone ([facility]) 12/21/2021    Disease of multiple valves of heart     Dysphagia     EtOH dependence ([facility])     Falls     GERD (gastroesophageal reflux disease)     Hemiparesis affecting left side as late effect of cerebrovascular accident ([facility])     LEFT SIDE NON DOMINANT FOLLOWING STROKE    Hydrocele     Hyperlipidemia     Hypertension     Hypothyroid     Inguinal hernia     MI (myocardial infarction) ([facility])     Moderate aortic regurgitation     Multiple fractures of rib involving four or more ribs     left    Obstructive and reflux uropathy     Osteomyelitis, ankle and foot ([facility])     left    Paralytic gait     Peripheral vascular angioplasty status 12/21/2021    PFO (patent foramen ovale)     Psychosis ([facility])     Schizophrenia ([facility])     Spondylosis     TIA (transient ischemic attack)     Traumatic hemorrhage of cerebrum ([facility])     Ulcer of left heel, with necrosis of bone ([facility]) 12/27/2021    Ulcer of left heel, with necrosis of muscle ([facility]) 12/21/2021    UTI (urinary tract infection)     Vitamin D deficiency      Past Surgical History:   Procedure Laterality Date    CARPAL TUNNEL RELEASE  10/01/2016    Dr. Chang    FINGER AMPUTATION      FOOT DEBRIDEMENT Left  Daily    sodium bicarbonate, 1,300 mg, Oral, BID    sodium chloride, 1 g, Oral, TID WC    tamsulosin, 0.4 mg, Oral, Nightly    vitamin C, 500 mg, Oral, Daily    insulin lispro, 0-4 Units, SubCUTAneous, 4x Daily AC & HS    polyethylene glycol, 17 g, Oral, Daily    senna, 1 tablet, Oral, BID    aspirin, 81 mg, Oral, Daily    sodium chloride flush, 5-40 mL, IntraVENous, 2 times per day    losartan, 25 mg, Oral, Daily   sodium chloride flush, 5-40 mL, PRN  acetaminophen, 650 mg, Q6H PRN   Or  acetaminophen, 650 mg, Q6H PRN  ipratropium 0.5 mg-albuterol 2.5 mg, 1 Dose, Q4H PRN  morphine, 2 mg, Q4H PRN   Or  morphine, 4 mg, Q4H PRN  glucose, 4 tablet, PRN  dextrose bolus, 125 mL, PRN   Or  dextrose bolus, 250 mL, PRN  glucagon (rDNA), 1 mg, PRN  dextrose, , Continuous PRN  traMADol, 50 mg, Q6H PRN  sulfur hexafluoride microspheres, 2 mL, ONCE PRN  sodium chloride flush, 5-40 mL, PRN  sodium chloride, , PRN           ROS:   Negative for CP, palpitations, SOB at rest, dizziness/lightheadedness.      Physical Exam   Constitutional: Oriented to person, place, and time. Appears well-developed. No distress.   Cardiovascular: Normal rate, regular rhythm and positive murmur.    Pulmonary/Chest: Effort normal. No respiratory distress.   Abdominal: Soft. Bowel sounds are normal.   Musculoskeletal: Normal range of motion.   Neurological: alert and oriented to person, place, and time.   Skin: Skin is warm and dry.   Psychiatric: normal mood and affect.       ASSESSMENT:  Patient Active Problem List   Diagnosis    Type 2 diabetes mellitus without complication, without long-term current use of insulin (HCC)    Hypertension    Hemiparesis affecting left side as late effect of cerebrovascular accident (HCC)    Peripheral arterial disease    Peripheral vascular angioplasty status    Alcohol abuse    Coronary artery disease involving native coronary artery of native heart without angina pectoris    Hyperlipidemia    Iron deficiency anemia

## 2025-02-26 NOTE — PROCEDURES
PROCEDURE NOTE  Date: 2/26/2025   Name: eJrome Steel  YOB: 1960    Procedures      PRELIMINARY TRANSESOPHAGEAL ECHOCARDIOGRAPHY REPORT    Date of Procedure: 2/26/2025    Indication:  AS, MVCAD    Sedation: Propofol    Complications: None    Preliminary findings:  Normal LV function   No significant MR  Moderate AS  CONRADO by 3D planimetry 1.6 cm²    Full report to follow    Kota Edwards MD, [facility]

## 2025-02-26 NOTE — PROGRESS NOTES
Department of Podiatry  Progress Note    SUBJECTIVE:  Jerome Steel is seen at bedside for right heel ulceration. No acute events overnight. Patient off the floor at time of rounding for LYRIC. Chart check completed without incident.    OBJECTIVE:    Scheduled Meds:   sodium chloride flush  5-40 mL IntraVENous 2 times per day    enoxaparin  40 mg SubCUTAneous Daily    atorvastatin  80 mg Oral Nightly    carvedilol  3.125 mg Oral BID with meals    [Held by provider] empagliflozin  10 mg Oral Daily    ferrous sulfate  325 mg Oral Daily with breakfast    folic acid  1,000 mcg Oral Daily    gabapentin  300 mg Oral BID    pantoprazole  40 mg Oral Daily    sodium bicarbonate  1,300 mg Oral BID    sodium chloride  1 g Oral TID WC    tamsulosin  0.4 mg Oral Nightly    vitamin C  500 mg Oral Daily    insulin lispro  0-4 Units SubCUTAneous 4x Daily AC & HS    polyethylene glycol  17 g Oral Daily    senna  1 tablet Oral BID    aspirin  81 mg Oral Daily    sodium chloride flush  5-40 mL IntraVENous 2 times per day    losartan  25 mg Oral Daily     Continuous Infusions:   dextrose      sodium chloride       PRN Meds:.sodium chloride flush, acetaminophen **OR** acetaminophen, ipratropium 0.5 mg-albuterol 2.5 mg, morphine **OR** morphine, glucose, dextrose bolus **OR** dextrose bolus, glucagon (rDNA), dextrose, traMADol, sulfur hexafluoride microspheres, sodium chloride flush, sodium chloride    Allergies   Allergen Reactions    Pcn [Penicillins] Anaphylaxis       /64   Pulse 77   Temp 97.6 °F (36.4 °C) (Temporal)   Resp 18   Ht 1.778 m (5' 10\")   Wt 67 kg (147 lb 11.3 oz)   SpO2 98%   BMI 21.19 kg/m²       EXAM:  Patient off the floor at time of rounding for LYRIC. Will change dressing tomorrow.    Previous Exam:     VASCULAR:  DP and PT pulses are palpable but diminished B/L, 1/4. CFT < 5 seconds B/L.  Warm to lukewarm from the tibial tuberosity to the distal aspect of the digits dorsally.  Varicosities noted to    Left ankle DP pressure: >255 mmHg     Right MAHOGANY: N/A     Left MAHOGANY: 1.5     IMPRESSION:  1.  Left abnormal calcification.     Vascular consult ordered earlier today; possible intervention tomorrow  - X-rays: Negative for osteomyelitis or gas  - Dressing: Xeroform, DSD; daily changes. To be managed by podiatry  - POD recs: Wound site appears stable, vascular studies and imaging results as described above.  Local wound care for now.   - Elevate heels at all times while in bed.  - Will continue to follow patient while they are in-house.  - Discussed patient with Dr. Julio Cesar Harrell DPM FACFAS  Fellowship-Trained Foot and Ankle Surgeon  Diplomate, [employer] Board of Foot and Ankle Surgeons  [phone]     Blake Turk DPM (PGY-I)

## 2025-02-26 NOTE — PROGRESS NOTES
Comprehensive Nutrition Assessment    Type and Reason for Visit:  Initial, Consult, Wound    Nutrition Recommendations/Plan:   Continue Current Diet, Modify Oral Nutrition Supplement   Glucerna BID & Dallin BID     Malnutrition Assessment:  Malnutrition Status:  Severe malnutrition (02/26/25 1454)    Context:  Chronic Illness     Findings of the 6 clinical characteristics of malnutrition:  Energy Intake:  75% or less estimated energy requirements for 1 month or longer (overall per EMR review/past admits/OVs)  Weight Loss:  Greater than 7.5% over 3 months (~14% wt loss x 2 months)     Body Fat Loss:  Unable to assess (pt in cath lab)     Muscle Mass Loss:  Unable to assess (pt in cath lab)    Fluid Accumulation:  No fluid accumulation     Strength:  Not Performed    Nutrition Assessment:    Pt transferred from [facility] for CTS eval for possible CABG. Initially admit w/ abd pain, found to have R inguinal hernia & possible SBO. Noted recent PNA. PMHx CVA, DM, HTN, ETOH abuse, PVD, foot ulcer w/ OM, previously declined CABG. Pt s/p LHC 2/21 & now pending LYRIC & RHC. Pt meets criteria for severe malnutrition. Heel wounds noted & followed by podiatry. Will add ONS and continue to monitor.    Nutrition Related Findings:    A&Ox4, active BS, soft abd, no edema, -I/Os, hyperglycemia Wound Type: Multiple, Unstageable (R/L heels)       Current Nutrition Intake & Therapies:    Average Meal Intake: 26-50% (average per doc flow)  Average Supplements Intake: 51-75%  ADULT DIET; Regular; Low Fat/Low Chol/High Fiber/BALJEET  ADULT ORAL NUTRITION SUPPLEMENT; Breakfast, Lunch, Dinner; Standard High Calorie/High Protein Oral Supplement    Anthropometric Measures:  Height: 177.8 cm (5' 10\")  Ideal Body Weight (IBW): 166 lbs (75 kg)       Current Body Weight: 66.7 kg (147 lb) (2/25 actual), 88.6 % IBW.    Current BMI (kg/m2): 21.1  Usual Body Weight: 77.6 kg (171 lb) (12/23/24 actual per EMR)     % Weight Change (Calculated): -14

## 2025-02-26 NOTE — ANESTHESIA PRE PROCEDURE
0.9 % injection 5-40 mL  5-40 mL IntraVENous PRN Charisma Barnes MD       • enoxaparin (LOVENOX) injection 40 mg  40 mg SubCUTAneous Daily Charisma Barnes MD   40 mg at 02/24/25 0935   • acetaminophen (TYLENOL) tablet 650 mg  650 mg Oral Q6H PRN Charisma Barnes MD        Or   • acetaminophen (TYLENOL) suppository 650 mg  650 mg Rectal Q6H PRN Charisma Barnes MD       • ipratropium 0.5 mg-albuterol 2.5 mg (DUONEB) nebulizer solution 1 Dose  1 Dose Inhalation Q4H PRN Charisma Barnes MD       • morphine (PF) injection 2 mg  2 mg IntraVENous Q4H PRN Charisma Barnes MD   2 mg at 02/24/25 0938    Or   • morphine sulfate (PF) injection 4 mg  4 mg IntraVENous Q4H PRN Charisma Barnes MD       • atorvastatin (LIPITOR) tablet 80 mg  80 mg Oral Nightly Charisma Barnes MD   80 mg at 02/25/25 2030   • carvedilol (COREG) tablet 3.125 mg  3.125 mg Oral BID with meals Charisma Barnes MD   3.125 mg at 02/25/25 1632   • [Held by provider] empagliflozin (JARDIANCE) tablet 10 mg  10 mg Oral Daily Charisma Barnes MD   10 mg at 02/24/25 0935   • ferrous sulfate (IRON 325) tablet 325 mg  325 mg Oral Daily with breakfast Charisma Barnes MD   325 mg at 02/25/25 0943   • folic acid (FOLVITE) tablet 1,000 mcg  1,000 mcg Oral Daily Charisma Barnes MD   1,000 mcg at 02/25/25 0943   • gabapentin (NEURONTIN) capsule 300 mg  300 mg Oral BID Charisma Barnes MD   300 mg at 02/25/25 2030   • pantoprazole (PROTONIX) tablet 40 mg  40 mg Oral Daily Charisma Barnes MD   40 mg at 02/25/25 0943   • sodium bicarbonate tablet 1,300 mg  1,300 mg Oral BID Charisma Barnes MD   1,300 mg at 02/25/25 2030   • sodium chloride tablet 1 g  1 g Oral TID WC Charisma Barnes MD   1 g at 02/25/25 1632   • tamsulosin (FLOMAX) capsule 0.4 mg  0.4 mg Oral Nightly Charisma Barnes MD   0.4 mg at 02/25/25 2029   • ascorbic acid (VITAMIN C) tablet 500 mg  500 mg Oral Daily Charisma Barnes MD   500 mg at 02/25/25 0943   • insulin lispro (HUMALOG,ADMELOG)

## 2025-02-26 NOTE — DISCHARGE INSTR - COC
Continuity of Care Form    Patient Name: Jerome Steel   :  1960  MRN:  [id number]    Admit date:  2025  Discharge date:  25    Code Status Order: Full Code   Advance Directives:   Advance Care Flowsheet Documentation             Admitting Physician:  Fernanda Matthews MD  PCP: Genet Kraus DO    Discharging Nurse: Kylah  Discharging Hospital Unit/Room#: 7415/7415-A  Discharging Unit Phone Number: [phone]    Emergency Contact:   Extended Emergency Contact Information  Primary Emergency Contact: Yimi Steel  Home Phone: [phone]  Mobile Phone: [phone]  Relation: Brother/Sister   needed? No  Secondary Emergency Contact: miguelina julian  Home Phone: [phone]  Mobile Phone: [phone]  Relation: Other  Preferred language: English   needed? No    Past Surgical History:  Past Surgical History:   Procedure Laterality Date    CARPAL TUNNEL RELEASE  10/01/2016    Dr. Chang    FINGER AMPUTATION      FOOT DEBRIDEMENT Left 2021    LEFT FOOT DEBRIDEMENT WITH BONE BIOPSY, WOUND VAC APPLICATION performed by Rober Harrell DPM at [facility] OR    FOOT DEBRIDEMENT Left 2022    LEFT FOOT DEBRIDEMENT INCISION AND DRAINAGE performed by Rober Harrell DPM at [facility] OR    FOOT DEBRIDEMENT Left 2022    LEFT FOOT PARTIAL CALCANECTOMY AND DEBRIDEMENT performed by Rober Harrell DPM at [facility] OR    KNEE ARTHROSCOPY      TONSILLECTOMY  1969    TRANSESOPHAGEAL ECHOCARDIOGRAM N/A 2021    TRANSESOPHAGEAL ECHOCARDIOGRAM WITH BUBBLE STUDY performed by Ana Nielsen MD at [facility] ENDOSCOPY    UPPER GASTROINTESTINAL ENDOSCOPY N/A 2021    EGD BIOPSY performed by Oscar Stephens DO at [facility] ENDOSCOPY       Immunization History:   Immunization History   Administered Date(s) Administered    COVID-19, J&J, (age 18y+), IM, 0.5 mL 2021    TDaP, ADACEL (age 10y-64y), BOOSTRIX (age 10y+), IM, 0.5mL 06/15/2024       Active Problems:  Patient Active Problem List

## 2025-02-26 NOTE — CARE COORDINATION
Reviewed chart, pt for LYRIC and RHC today. CTS and cardio following for CABG work up,.  Vasc and podiatry no interventions. [facility], will need precert to return. Will await therapy after any interventions. Envelope and ambulance/lette form in soft chart.Aretha Mireles, MSW, LSW

## 2025-02-26 NOTE — PROGRESS NOTES
[facility] Hospitalist Progress Note    Admitting Date and Time: 2/23/2025  7:41 PM  Admit Dx: CAD in native artery [I25.10]    Synopsis:  64 year old male with PMX of CVA with residual left hemiparesis, DM, HTN, HLD who initially presented to [facility] for abdominal pain. He was subsequently found to have an inguinal hernia. General surgery was consulted at the time. He had no indication for urgent surgery. He does have a history of CAD and cardiology was consulted for cardiac clearance.  He had LHC a few years back and records were reviewed that he was recommended to have CABG at that time. He deferred the procedure back then. A stress test was performed during this admission which showed fixed defect with no inducible ischemia. He subsequently underwent LHC which showed RCA occlusion. He is now agreeable to CABG. Hence he was transferred to [facility] for CTS evaluation.     Subjective:  Patient is being followed for CAD in native artery [I25.10]     Patient was seen at bedside..   Denies any complaint  No acute overnight event    ROS: denies fever, chills, cp, sob, n/v, HA unless stated above.      sodium chloride flush  5-40 mL IntraVENous 2 times per day    enoxaparin  40 mg SubCUTAneous Daily    atorvastatin  80 mg Oral Nightly    carvedilol  3.125 mg Oral BID with meals    [Held by provider] empagliflozin  10 mg Oral Daily    ferrous sulfate  325 mg Oral Daily with breakfast    folic acid  1,000 mcg Oral Daily    gabapentin  300 mg Oral BID    pantoprazole  40 mg Oral Daily    sodium bicarbonate  1,300 mg Oral BID    sodium chloride  1 g Oral TID WC    tamsulosin  0.4 mg Oral Nightly    vitamin C  500 mg Oral Daily    insulin lispro  0-4 Units SubCUTAneous 4x Daily AC & HS    polyethylene glycol  17 g Oral Daily    senna  1 tablet Oral BID    aspirin  81 mg Oral Daily    sodium chloride flush  5-40 mL IntraVENous 2 times per day    losartan  25 mg Oral Daily     sodium chloride flush, 5-40  CABG recommended. CTS surgery consulted. Continue on aspirin, lipitor, coreg.  Scheduled for right heart cath and LYRIC on 2/26/2025  Recent Pneumonia on CT - completed levaquin   NIDDM - Hba1c 5.2, continue to monitor, sliding scale   Left heel ulcer, Podiatry notes from previous admission noted, no intervention at present time.   CVA with residual left sided hemiparesis   HLD - lipitor   HTN - on coreg, losartan   Anemia - TIFFANY, continue iron, monitor and transfuse for Hgb <7  Inguinal hernia - reducible, will need surgery at some point after cardiac issues are addressed.     NOTE: This report was transcribed using voice recognition software. Every effort was made to ensure accuracy; however, inadvertent computerized transcription errors may be present.  Electronically signed by DARREN GEORGES MD on 2/26/2025 at 1:43 PM

## 2025-02-26 NOTE — PLAN OF CARE
Problem: Chronic Conditions and Co-morbidities  Goal: Patient's chronic conditions and co-morbidity symptoms are monitored and maintained or improved  Outcome: Progressing     Problem: Discharge Planning  Goal: Discharge to home or other facility with appropriate resources  Outcome: Progressing     Problem: Safety - Adult  Goal: Free from fall injury  Outcome: Progressing     Problem: Skin/Tissue Integrity  Goal: Skin integrity remains intact  Description: 1.  Monitor for areas of redness and/or skin breakdown  2.  Assess vascular access sites hourly  3.  Every 4-6 hours minimum:  Change oxygen saturation probe site  4.  Every 4-6 hours:  If on nasal continuous positive airway pressure, respiratory therapy assess nares and determine need for appliance change or resting period  Outcome: Progressing     Problem: Pain  Goal: Verbalizes/displays adequate comfort level or baseline comfort level  Outcome: Progressing     Problem: Cardiovascular - Adult  Goal: Maintains optimal cardiac output and hemodynamic stability  Outcome: Progressing     Problem: Cardiovascular - Adult  Goal: Absence of cardiac dysrhythmias or at baseline  Outcome: Progressing     Problem: Genitourinary - Adult  Goal: Urinary catheter remains patent  Outcome: Progressing     Problem: Infection - Adult  Goal: Absence of infection at discharge  Outcome: Progressing     Problem: Infection - Adult  Goal: Absence of infection during hospitalization  Outcome: Progressing     Problem: Infection - Adult  Goal: Absence of fever/infection during anticipated neutropenic period  Outcome: Progressing     Problem: Hematologic - Adult  Goal: Maintains hematologic stability  Outcome: Progressing

## 2025-02-27 ENCOUNTER — APPOINTMENT (OUTPATIENT)
Dept: CT IMAGING | Age: 65
End: 2025-02-27
Attending: INTERNAL MEDICINE
Payer: COMMERCIAL

## 2025-02-27 LAB
GLUCOSE BLD-MCNC: 106 MG/DL (ref 74–99)
GLUCOSE BLD-MCNC: 111 MG/DL (ref 74–99)
GLUCOSE BLD-MCNC: 187 MG/DL (ref 74–99)
GLUCOSE BLD-MCNC: 200 MG/DL (ref 74–99)

## 2025-02-27 PROCEDURE — 99223 1ST HOSP IP/OBS HIGH 75: CPT | Performed by: INTERNAL MEDICINE

## 2025-02-27 PROCEDURE — 97530 THERAPEUTIC ACTIVITIES: CPT

## 2025-02-27 PROCEDURE — 6360000004 HC RX CONTRAST MEDICATION: Performed by: RADIOLOGY

## 2025-02-27 PROCEDURE — 74174 CTA ABD&PLVS W/CONTRAST: CPT

## 2025-02-27 PROCEDURE — 2060000000 HC ICU INTERMEDIATE R&B

## 2025-02-27 PROCEDURE — 6370000000 HC RX 637 (ALT 250 FOR IP): Performed by: INTERNAL MEDICINE

## 2025-02-27 PROCEDURE — 6360000002 HC RX W HCPCS: Performed by: INTERNAL MEDICINE

## 2025-02-27 PROCEDURE — 2500000003 HC RX 250 WO HCPCS: Performed by: INTERNAL MEDICINE

## 2025-02-27 PROCEDURE — 97535 SELF CARE MNGMENT TRAINING: CPT

## 2025-02-27 PROCEDURE — 99232 SBSQ HOSP IP/OBS MODERATE 35: CPT | Performed by: STUDENT IN AN ORGANIZED HEALTH CARE EDUCATION/TRAINING PROGRAM

## 2025-02-27 PROCEDURE — 99222 1ST HOSP IP/OBS MODERATE 55: CPT | Performed by: SURGERY

## 2025-02-27 PROCEDURE — 71275 CT ANGIOGRAPHY CHEST: CPT

## 2025-02-27 PROCEDURE — 82962 GLUCOSE BLOOD TEST: CPT

## 2025-02-27 PROCEDURE — 75572 CT HRT W/3D IMAGE: CPT

## 2025-02-27 RX ORDER — SODIUM CHLORIDE 0.9 % (FLUSH) 0.9 %
10 SYRINGE (ML) INJECTION
Status: ACTIVE | OUTPATIENT
Start: 2025-02-27 | End: 2025-02-28

## 2025-02-27 RX ORDER — IOPAMIDOL 755 MG/ML
100 INJECTION, SOLUTION INTRAVASCULAR
Status: COMPLETED | OUTPATIENT
Start: 2025-02-27 | End: 2025-02-27

## 2025-02-27 RX ADMIN — ATORVASTATIN CALCIUM 80 MG: 80 TABLET, FILM COATED ORAL at 20:22

## 2025-02-27 RX ADMIN — GABAPENTIN 300 MG: 300 CAPSULE ORAL at 08:06

## 2025-02-27 RX ADMIN — PANTOPRAZOLE SODIUM 40 MG: 40 TABLET, DELAYED RELEASE ORAL at 08:07

## 2025-02-27 RX ADMIN — SODIUM CHLORIDE 1 G: 1 TABLET ORAL at 11:16

## 2025-02-27 RX ADMIN — ENOXAPARIN SODIUM 40 MG: 100 INJECTION SUBCUTANEOUS at 08:08

## 2025-02-27 RX ADMIN — CARVEDILOL 3.12 MG: 3.12 TABLET, FILM COATED ORAL at 18:31

## 2025-02-27 RX ADMIN — INSULIN LISPRO 1 UNITS: 100 INJECTION, SOLUTION INTRAVENOUS; SUBCUTANEOUS at 20:30

## 2025-02-27 RX ADMIN — SODIUM CHLORIDE, PRESERVATIVE FREE 10 ML: 5 INJECTION INTRAVENOUS at 08:10

## 2025-02-27 RX ADMIN — TRAMADOL HYDROCHLORIDE 50 MG: 50 TABLET, COATED ORAL at 08:05

## 2025-02-27 RX ADMIN — GABAPENTIN 300 MG: 300 CAPSULE ORAL at 20:21

## 2025-02-27 RX ADMIN — SODIUM CHLORIDE, PRESERVATIVE FREE 10 ML: 5 INJECTION INTRAVENOUS at 08:09

## 2025-02-27 RX ADMIN — IOPAMIDOL 100 ML: 755 INJECTION, SOLUTION INTRAVENOUS at 07:09

## 2025-02-27 RX ADMIN — TAMSULOSIN HYDROCHLORIDE 0.4 MG: 0.4 CAPSULE ORAL at 20:21

## 2025-02-27 RX ADMIN — MORPHINE SULFATE 2 MG: 2 INJECTION, SOLUTION INTRAMUSCULAR; INTRAVENOUS at 20:22

## 2025-02-27 RX ADMIN — LOSARTAN POTASSIUM 25 MG: 25 TABLET, FILM COATED ORAL at 08:07

## 2025-02-27 RX ADMIN — FERROUS SULFATE TAB 325 MG (65 MG ELEMENTAL FE) 325 MG: 325 (65 FE) TAB at 08:06

## 2025-02-27 RX ADMIN — TRAMADOL HYDROCHLORIDE 50 MG: 50 TABLET, COATED ORAL at 21:32

## 2025-02-27 RX ADMIN — INSULIN LISPRO 1 UNITS: 100 INJECTION, SOLUTION INTRAVENOUS; SUBCUTANEOUS at 11:16

## 2025-02-27 RX ADMIN — SODIUM BICARBONATE 1300 MG: 650 TABLET ORAL at 20:21

## 2025-02-27 RX ADMIN — SODIUM CHLORIDE, PRESERVATIVE FREE 10 ML: 5 INJECTION INTRAVENOUS at 20:30

## 2025-02-27 RX ADMIN — SODIUM BICARBONATE 1300 MG: 650 TABLET ORAL at 08:06

## 2025-02-27 RX ADMIN — ASPIRIN 81 MG CHEWABLE TABLET 81 MG: 81 TABLET CHEWABLE at 08:06

## 2025-02-27 RX ADMIN — SENNOSIDES 8.6 MG: 8.6 TABLET, FILM COATED ORAL at 20:21

## 2025-02-27 RX ADMIN — SODIUM CHLORIDE 1 G: 1 TABLET ORAL at 16:53

## 2025-02-27 RX ADMIN — Medication 500 MG: at 08:06

## 2025-02-27 RX ADMIN — FOLIC ACID 1000 MCG: 1 TABLET ORAL at 08:06

## 2025-02-27 RX ADMIN — TRAMADOL HYDROCHLORIDE 50 MG: 50 TABLET, COATED ORAL at 14:01

## 2025-02-27 RX ADMIN — TRAMADOL HYDROCHLORIDE 50 MG: 50 TABLET, COATED ORAL at 01:58

## 2025-02-27 RX ADMIN — SODIUM CHLORIDE 1 G: 1 TABLET ORAL at 08:08

## 2025-02-27 RX ADMIN — CARVEDILOL 3.12 MG: 3.12 TABLET, FILM COATED ORAL at 08:07

## 2025-02-27 RX ADMIN — SENNOSIDES 8.6 MG: 8.6 TABLET, FILM COATED ORAL at 08:07

## 2025-02-27 ASSESSMENT — PAIN SCALES - GENERAL
PAINLEVEL_OUTOF10: 8
PAINLEVEL_OUTOF10: 5
PAINLEVEL_OUTOF10: 5
PAINLEVEL_OUTOF10: 9
PAINLEVEL_OUTOF10: 8
PAINLEVEL_OUTOF10: 7
PAINLEVEL_OUTOF10: 8

## 2025-02-27 ASSESSMENT — ENCOUNTER SYMPTOMS
BLOOD IN STOOL: 0
ABDOMINAL DISTENTION: 0
FACIAL SWELLING: 0
TROUBLE SWALLOWING: 0
ABDOMINAL PAIN: 1
DIARRHEA: 0
COUGH: 0
SHORTNESS OF BREATH: 0
VOMITING: 0
NAUSEA: 1
COLOR CHANGE: 0
CONSTIPATION: 1

## 2025-02-27 ASSESSMENT — PAIN SCALES - WONG BAKER
WONGBAKER_NUMERICALRESPONSE: NO HURT
WONGBAKER_NUMERICALRESPONSE: NO HURT

## 2025-02-27 ASSESSMENT — PAIN DESCRIPTION - DESCRIPTORS
DESCRIPTORS: ACHING;SHOOTING;SHARP
DESCRIPTORS: ACHING;THROBBING;NAGGING
DESCRIPTORS: ACHING;DISCOMFORT;DULL

## 2025-02-27 ASSESSMENT — PAIN DESCRIPTION - ORIENTATION
ORIENTATION: RIGHT
ORIENTATION: RIGHT;LEFT
ORIENTATION: RIGHT

## 2025-02-27 ASSESSMENT — PAIN DESCRIPTION - LOCATION
LOCATION: FOOT;LEG
LOCATION: BACK;FOOT;GENERALIZED
LOCATION: FOOT;LEG
LOCATION: FOOT

## 2025-02-27 NOTE — PROGRESS NOTES
Physical Therapy  Physical Therapy treatment note     Name: Jerome Steel  : 1960  MRN: [id number]      Date of Service: 2025    Evaluating PT:  Lester De León PT, DPT    Room #:  7415/7415-A  Diagnosis:  CAD in native artery [I25.10]  PMHx/PSHx:  EtOH abuse, CVA, CAD  Procedure/Surgery:  N/A  Precautions:  fall risk, L sided deficits, R heel wound, bed alarm, contact isolation, hx of calcanectomy to the left heel   Equipment Needs:  TBD    SUBJECTIVE:    Pt admitted from [facility]. Pt ambulated with ww PTA.    OBJECTIVE:   Initial Evaluation  Date: 25 Treatment 25 Short Term/ Long Term   Goals   AM-PAC 6 Clicks 16/24 15/24    Was pt agreeable to Eval/treatment? yes Yes     Does pt have pain? No pain No complaints at rest, some R foot soreness with ambulation   Positioned for comfort post session     Bed Mobility  Rolling: NT  Supine to sit: min A  Sit to supine: min A  Scooting: min A Rolling: Roger   Supine to sit: Roger   Sit to supine: Roger   Scooting: Roger  Rolling: mod I  Supine to sit: mod I  Sit to supine: mod I  Scooting: mod I   Transfers Sit to stand: min A  Stand to sit: min A  Stand pivot: min A with ww Sit to stand: ModA  Stand to sit: ModA  Stand pivot: NT Sit to stand: mod I  Stand to sit: mod I  Stand pivot: mod I with AAD   Ambulation    15'x2 with ww min A 20 feet with WW and ModA  100'+ with AAD mod I   Stair negotiation: ascended and descended  NT NT      Strength/ROM:   BLE grossly 3+/5  BLE AROM WFL    Balance:   Static Sitting: SBA  Dynamic Sitting: SBA  Static Standing: CGA with ww  Dynamic Standing: ModA with ww    Pt is A & O x 3-4. Pt follows all commands  Sensation:  Pt denies numbness and tingling to extremities  Edema:  NT    Vitals:   HR and Spo2% WNL on room air     Therapeutic Exercises:  NA    Patient education  Pt educated on safety with all mobility, gait sequencing.    Patient response to education:   Pt verbalized understanding Pt demonstrated skill  Pt requires further education in this area   Yes  Yes  reinforce     ASSESSMENT:    Comments:  RN clearing pt for activity- reports bed rest orders are no longer in place. Pt required max encouragement for participation. Pt received in supine and agreeable to PT treatment upon arrival. Pt completing all mobility as noted above in the grid within activity tolerance. Vitals skillfully monitored in response to activity. Pt completed short distance ambulation in room with assistance and cues for WW use and safety. Pt exhibits L sided weakness, reduced activity tolerance, and insight to deficits. Pt skillfully positioned in supine with R LE in prevalon boot at end of session with all needs in place and lines managed. Patient would benefit from continued skilled PT to maximize functional mobility independence.     Bed alarm activated.       Treatment:  Patient practiced and was instructed in the following treatment:    Bed Mobility: Verbal cues for proper positioning and sequencing to perform bed mobility to facilitate maximum independence.   Transfers: Verbal cues for proper positioning and sequencing to perform transfers safely with maximum independence.   Gait Training: Verbal cues for pacing and safety to maximize functional mobility independence.   Skillful positioning in bed to protect skin and joint integrity.   Pt education for WW use and safety with all mobility   Vitals and symptoms monitored during session.    PLAN:    Patient is making good progress towards established goals.  Will continue with current POC.      Time in  1310  Time out  1333    Total Treatment Time  23 minutes     CPT codes:  [] Gait training 30066 0 minutes  [] Manual therapy 86828 0 minutes  [x] Therapeutic activities 56118 23 minutes  [] Therapeutic exercises 32177 0 minutes  [] Neuromuscular reeducation 67463 0 minutes    Kayley Mcneal PT, DPT  [id number]

## 2025-02-27 NOTE — PLAN OF CARE
Problem: Chronic Conditions and Co-morbidities  Goal: Patient's chronic conditions and co-morbidity symptoms are monitored and maintained or improved  Outcome: Progressing     Problem: Discharge Planning  Goal: Discharge to home or other facility with appropriate resources  Outcome: Progressing     Problem: Safety - Adult  Goal: Free from fall injury  Outcome: Progressing     Problem: Skin/Tissue Integrity  Goal: Skin integrity remains intact  Description: 1.  Monitor for areas of redness and/or skin breakdown  2.  Assess vascular access sites hourly  3.  Every 4-6 hours minimum:  Change oxygen saturation probe site  4.  Every 4-6 hours:  If on nasal continuous positive airway pressure, respiratory therapy assess nares and determine need for appliance change or resting period  Outcome: Progressing     Problem: Pain  Goal: Verbalizes/displays adequate comfort level or baseline comfort level  Outcome: Progressing     Problem: Cardiovascular - Adult  Goal: Maintains optimal cardiac output and hemodynamic stability  Outcome: Progressing     Problem: Cardiovascular - Adult  Goal: Absence of cardiac dysrhythmias or at baseline  Outcome: Progressing     Problem: Genitourinary - Adult  Goal: Urinary catheter remains patent  Outcome: Progressing     Problem: Infection - Adult  Goal: Absence of infection at discharge  Outcome: Progressing     Problem: Infection - Adult  Goal: Absence of infection during hospitalization  Outcome: Progressing     Problem: Infection - Adult  Goal: Absence of fever/infection during anticipated neutropenic period  Outcome: Progressing     Problem: Hematologic - Adult  Goal: Maintains hematologic stability  Outcome: Progressing     Problem: Nutrition Deficit:  Goal: Optimize nutritional status  Outcome: Progressing

## 2025-02-27 NOTE — PLAN OF CARE
Problem: Chronic Conditions and Co-morbidities  Goal: Patient's chronic conditions and co-morbidity symptoms are monitored and maintained or improved  2/26/2025 2148 by Adalberto Pelaez RN  Outcome: Progressing  2/26/2025 1304 by Mairann Rodríguez RN  Outcome: Progressing     Problem: Discharge Planning  Goal: Discharge to home or other facility with appropriate resources  2/26/2025 2148 by Adalberto Pelaez RN  Outcome: Progressing  2/26/2025 1304 by Mariann Rodríguez RN  Outcome: Progressing     Problem: Safety - Adult  Goal: Free from fall injury  2/26/2025 2148 by Adalberto Pelaez RN  Outcome: Progressing  2/26/2025 1304 by Mariann Rodríguez RN  Outcome: Progressing     Problem: Skin/Tissue Integrity  Goal: Skin integrity remains intact  Description: 1.  Monitor for areas of redness and/or skin breakdown  2.  Assess vascular access sites hourly  3.  Every 4-6 hours minimum:  Change oxygen saturation probe site  4.  Every 4-6 hours:  If on nasal continuous positive airway pressure, respiratory therapy assess nares and determine need for appliance change or resting period  2/26/2025 1304 by Mariann Rodríguez RN  Outcome: Progressing     Problem: Pain  Goal: Verbalizes/displays adequate comfort level or baseline comfort level  2/26/2025 1304 by Mariann Rodríguez RN  Outcome: Progressing     Problem: Cardiovascular - Adult  Goal: Maintains optimal cardiac output and hemodynamic stability  2/26/2025 1304 by Mariann Rodríguez RN  Outcome: Progressing  Goal: Absence of cardiac dysrhythmias or at baseline  2/26/2025 1304 by Mariann Rodríguez RN  Outcome: Progressing     Problem: Genitourinary - Adult  Goal: Urinary catheter remains patent  2/26/2025 1304 by Mariann Rodríguez RN  Outcome: Progressing     Problem: Infection - Adult  Goal: Absence of infection at discharge  2/26/2025 1304 by Mariann Rodríguez RN  Outcome: Progressing  Goal: Absence of infection during hospitalization  2/26/2025 1304 by Mariann Rodríguez

## 2025-02-27 NOTE — PROGRESS NOTES
Messaged Dr Shah to request clarification on when to exchange chronic taylor       Electronically signed by Mariann Rodríguez RN on 2/27/2025 at 2:55 PM

## 2025-02-27 NOTE — CONSULTS
General Surgery   Consult Note      Patient's Name/Date of Birth: Jerome Steel / 1960    Date: February 27, 2025     PCP: Genet Kraus DO     Chief Complaint: Transferred from [facility]     HPI:   Jerome Steel is a 64 y.o. male who was transferred from [facility] for evaluation by cardiothoracic surgery for CABG due to his multivessel CAD.  Patient was seen at [facility] on 2/17/2025 by Dr. Karimi for right inguinal hernia which general surgery has been reconsulted for again.  At that time there were no signs of obstruction and we recommended cardiac clearance prior to any intervention.  He was seen by cardiology and deemed to be high risk for any surgery and transferred to [facility] to get evaluated by CTS who plan for scheduling intervention outpatient.     Upon evaluation patient states he has had this hernia for 15 years and never had any issues with it until 2 weeks ago.  Reports he was bending to  something when he felt a sudden onset of sharp shooting pain in his right inguinal area and scrotum.  Pain was constant, rated 14/10 in severity and associated with scrotal edema and nausea.  Denies any skin changes, any current nausea, emesis, abdominal pain.  He has not had a bowel movement since this started 2 weeks ago but is passing flatus.    PMHx includes CVA with left-sided hemiparesis, schizophrenia, osteomyelitis, hypothyroidism, HTN, HLD, hydrocele, GERD, DM, COPD, arthritis, prostate abscess that he was supposed to undergo a TURP for but was unable to due to his cardiac status.  No past abdominal/inguinal surgeries.       Patient is afebrile hemodynamically stable.  Labs reviewed from 2 days ago showed WBC 9.1.  CTA abdomen pelvis yesterday showed large right inguinal hernia with small bowel loops in the hernia sac without any obstruction or strangulation, no free air, no surrounding inflammation or free fluid.     Patient Active Problem List   Diagnosis    Type 2  diabetes mellitus without complication, without long-term current use of insulin (HCC)    Hypertension    Hemiparesis affecting left side as late effect of cerebrovascular accident (HCC)    Peripheral arterial disease    Peripheral vascular angioplasty status    Alcohol abuse    Coronary artery disease involving native coronary artery of native heart without angina pectoris    Hyperlipidemia    Iron deficiency anemia secondary to inadequate dietary iron intake    History of MI (myocardial infarction)    Chronic bilateral low back pain without sciatica    Intracranial hemorrhage (HCC)    History of stroke    Opioid dependence with current use (HCC)    Prostate abscess    Alcoholism (HCC)    Prostatic cyst    SBO (small bowel obstruction) ([facility])    Intestinal obstruction without gangrene due to right inguinal hernia    Severe protein-energy malnutrition    Coronary artery disease due to lipid rich plaque    Pre-op evaluation    PAD (peripheral artery disease)       Allergies   Allergen Reactions    Pcn [Penicillins] Anaphylaxis       Past Medical History:   Diagnosis Date    Abscess of prostate     Anemia     Arthritis     Atherosclerotic heart disease of native coronary artery without angina pectoris     Bilateral carpal tunnel syndrome 2016    Cerebral artery occlusion with cerebral infarction ([facility])     Chronic back pain     COPD (chronic obstructive pulmonary disease) (HCC)     Coronary artery disease involving native coronary artery of native heart without angina pectoris 09/16/2022    CVA (cerebral vascular accident) ([facility]) 11/2015, 2017    Diabetes mellitus ([facility])     Type 2    Diabetic foot ulcer with osteomyelitis ([facility]) 12/21/2021    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 2 diabetes mellitus, with necrosis of bone ([facility]) 12/21/2021    Disease of multiple valves of heart     Dysphagia     EtOH dependence ([facility])     Falls     GERD (gastroesophageal reflux disease)     Hemiparesis affecting left side as late

## 2025-02-27 NOTE — PROGRESS NOTES
Department of Podiatry  Progress Note    SUBJECTIVE:  Jerome Steel is seen at bedside for right heel ulceration. No acute events overnight. Patient denies any N/V/D/F/C/SOB/CP. Patient states no new pain, sensation, odor, or drainage from either lower extremity. No other pedal complaints at this time.     OBJECTIVE:    Scheduled Meds:   [START ON 2/28/2025] ceFAZolin  2,000 mg IntraVENous On Call to OR    sodium chloride flush  5-40 mL IntraVENous 2 times per day    enoxaparin  40 mg SubCUTAneous Daily    atorvastatin  80 mg Oral Nightly    carvedilol  3.125 mg Oral BID with meals    [Held by provider] empagliflozin  10 mg Oral Daily    ferrous sulfate  325 mg Oral Daily with breakfast    folic acid  1,000 mcg Oral Daily    gabapentin  300 mg Oral BID    pantoprazole  40 mg Oral Daily    sodium bicarbonate  1,300 mg Oral BID    sodium chloride  1 g Oral TID WC    tamsulosin  0.4 mg Oral Nightly    vitamin C  500 mg Oral Daily    insulin lispro  0-4 Units SubCUTAneous 4x Daily AC & HS    polyethylene glycol  17 g Oral Daily    senna  1 tablet Oral BID    aspirin  81 mg Oral Daily    sodium chloride flush  5-40 mL IntraVENous 2 times per day    losartan  25 mg Oral Daily     Continuous Infusions:   dextrose      sodium chloride       PRN Meds:.sodium chloride flush, sodium chloride flush, acetaminophen **OR** acetaminophen, ipratropium 0.5 mg-albuterol 2.5 mg, morphine **OR** morphine, glucose, dextrose bolus **OR** dextrose bolus, glucagon (rDNA), dextrose, traMADol, sulfur hexafluoride microspheres, sodium chloride flush, sodium chloride    Allergies   Allergen Reactions    Pcn [Penicillins] Anaphylaxis       /70   Pulse 74   Temp 97.2 °F (36.2 °C) (Temporal)   Resp 18   Ht 1.778 m (5' 10\")   Wt 67 kg (147 lb 11.3 oz)   SpO2 98%   BMI 21.19 kg/m²       EXAM:    VASCULAR:  DP and PT pulses are palpable but diminished B/L, 1/4. CFT < 5 seconds B/L.  Warm to lukewarm from the tibial tuberosity to the

## 2025-02-27 NOTE — PROGRESS NOTES
CC: Hernia     Brief HPI:  Patient seen and discussed with Dr. Peoples. No complaints       Past Medical History:   Diagnosis Date    Abscess of prostate     Anemia     Arthritis     Atherosclerotic heart disease of native coronary artery without angina pectoris     Bilateral carpal tunnel syndrome 2016    Cerebral artery occlusion with cerebral infarction ([facility])     Chronic back pain     COPD (chronic obstructive pulmonary disease) ([facility])     Coronary artery disease involving native coronary artery of native heart without angina pectoris 09/16/2022    CVA (cerebral vascular accident) ([facility]) 11/2015, 2017    Diabetes mellitus ([facility])     Type 2    Diabetic foot ulcer with osteomyelitis ([facility]) 12/21/2021    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 2 diabetes mellitus, with necrosis of bone ([facility]) 12/21/2021    Disease of multiple valves of heart     Dysphagia     EtOH dependence ([facility])     Falls     GERD (gastroesophageal reflux disease)     Hemiparesis affecting left side as late effect of cerebrovascular accident ([facility])     LEFT SIDE NON DOMINANT FOLLOWING STROKE    Hydrocele     Hyperlipidemia     Hypertension     Hypothyroid     Inguinal hernia     MI (myocardial infarction) ([facility])     Moderate aortic regurgitation     Multiple fractures of rib involving four or more ribs     left    Obstructive and reflux uropathy     Osteomyelitis, ankle and foot ([facility])     left    Paralytic gait     Peripheral vascular angioplasty status 12/21/2021    PFO (patent foramen ovale)     Psychosis ([facility])     Schizophrenia ([facility])     Spondylosis     TIA (transient ischemic attack)     Traumatic hemorrhage of cerebrum ([facility])     Ulcer of left heel, with necrosis of bone ([facility]) 12/27/2021    Ulcer of left heel, with necrosis of muscle ([facility]) 12/21/2021    UTI (urinary tract infection)     Vitamin D deficiency      Past Surgical History:   Procedure Laterality Date    CARPAL TUNNEL RELEASE  10/01/2016    Dr. Chang    FINGER AMPUTATION      FOOT  respiratory infections of disease caused by other   serogroups of Legionella pneumophila.         Strep Pneumoniae Antigen [[id number]] Collected: 02/16/25 9755    Order Status: Completed Specimen: Urine, clean catch Updated: 02/17/25 1011     Source .URINE     Strep pneumo Ag NEGATIVE     Comment:       Presumptive Negative  suggests no current or recent pneumococcal infection. Infection due to Strep pneumoniae   cannot be ruled out since the antigen present in the sample may be below the detection limit   of the test.                      Creatinine   Date/Time Value Ref Range Status   02/25/2025 06:35 AM 0.7 0.70 - 1.20 mg/dL Final   02/24/2025 06:14 AM 0.7 0.70 - 1.20 mg/dL Final   02/23/2025 08:13 AM 0.8 0.70 - 1.20 mg/dL Final          Note: Greater than or equal to 35 minutes was spent providing face-to-face patient care, including:  and coordinating care, reviewing the chart, labs, and diagnostics, as well as medical decision making. Greater than 50% of this time was spent instructing and counseling the patient face to face regarding findings and recommendations.      Patient seen. No complaints.  As above  Ok for discharge from CTS perspective with outpatient follow-up for further discussion regarding intervention    Mariann Peoples DO  Cardiothoracic Surgery

## 2025-02-27 NOTE — PROCEDURES
[facility]              [address] [place] [address]                           PULMONARY FUNCTION      PATIENT NAME: LUCIO ORELLANA             : 1960  MED REC NO: [id number]                        ROOM: 7415  ACCOUNT NO: [id number]                       ADMIT DATE: 2025  PROVIDER: Gage Melendez DO      DATE OF PROCEDURE: 2025    A 64-year-old male, 70 inches, 168 pounds, preoperative evaluation in 20 pack-year smoker.    FINDINGS:  Spirometry reveals forced vital capacity at 3.82 L, 86% of predicted, with an FEV1 of 2.94 L, 86% of predicted, with an FEV1/FVC ratio of 77%.  Mid flow rates are normal at 95 L/second with maximum voluntary ventilation at 87 L/minute, which is 65% of predicted.  Diffusing capacity is reduced at 14.48 mL/minute/mmHg which is 54% of predicted.    IMPRESSION:  Spirometry is normal with no definitive airflow obstruction.  There is moderate to severe loss in gas transfer, which may indicate an obstruction at the alveolar capillary bed at various disease entity.  Clinical correlation needed.          GAGE MELENDEZ DO      D:  2025 13:57:16     T:  2025 20:06:34     THALIA/ALICIA  Job #:  [id number]     Doc#:  [id number]

## 2025-02-27 NOTE — CARE COORDINATION
Reviewed chart, CTS signed , called #[phone] for therapy updates. [facility] [employer] [facility] precert. Received message from [facility], that precert is good through 2/28. Arteriogram scheduled for 2/28. Envelope and ambullete form in soft chart.Aretha Mireles, MSW, LSW

## 2025-02-27 NOTE — PROGRESS NOTES
General surgery consult called to  per perfect serv patient added to census.Resident Safia notified of consult and added to census.

## 2025-02-27 NOTE — PROGRESS NOTES
PROGRESS NOTE     CARDIOLOGY    Chief complaint: Seen today for follow up, management & recommendations for coronary artery disease, aortic stenosis.    He denies chest pain or shortness of breath today.  He was reclining in bed.  He was comfortable and in no distress    Wt Readings from Last 3 Encounters:   02/25/25 67 kg (147 lb 11.3 oz)   02/18/25 67.1 kg (148 lb)   12/23/24 77.9 kg (171 lb 12.8 oz)     Temp Readings from Last 3 Encounters:   02/27/25 97.9 °F (36.6 °C)   02/23/25 97.7 °F (36.5 °C)   12/31/24 98.1 °F (36.7 °C)     BP Readings from Last 3 Encounters:   02/27/25 104/69   02/26/25 (!) 95/59   02/23/25 (!) 117/59     Pulse Readings from Last 3 Encounters:   02/27/25 77   02/26/25 68   02/23/25 61         Intake/Output Summary (Last 24 hours) at 2/27/2025 1146  Last data filed at 2/27/2025 0805  Gross per 24 hour   Intake 480 ml   Output 1350 ml   Net -870 ml       Recent Labs     02/25/25  0635   WBC 9.1   HGB 9.5*   HCT 31.1*   MCV 92.3        Recent Labs     02/25/25  0635      K 4.1      CO2 21*   BUN 15   CREATININE 0.7     No results for input(s): \"PROTIME\", \"INR\" in the last 72 hours.  No results for input(s): \"CKTOTAL\", \"CKMB\", \"CKMBINDEX\", \"TROPONINI\" in the last 72 hours.  No results for input(s): \"BNP\" in the last 72 hours.  No results for input(s): \"CHOL\", \"HDL\", \"TRIG\" in the last 72 hours.    Invalid input(s): \"CHOLHDLR\", \"LDLCALCU\"    No results for input(s): \"TROPHS\" in the last 72 hours.      sodium chloride flush 0.9 % injection 10 mL, Once PRN  [START ON 2/28/2025] ceFAZolin (ANCEF) 2,000 mg in sterile water 20 mL IV syringe, On Call to OR  sodium chloride flush 0.9 % injection 5-40 mL, 2 times per day  sodium chloride flush 0.9 % injection 5-40 mL, PRN  enoxaparin (LOVENOX) injection 40 mg, Daily  acetaminophen (TYLENOL) tablet 650 mg, Q6H PRN   Or  acetaminophen (TYLENOL) suppository 650 mg, Q6H PRN  ipratropium 0.5 mg-albuterol 2.5 mg (DUONEB) nebulizer

## 2025-02-27 NOTE — PROGRESS NOTES
No acute plans for vascular intervention at this time. May benefit from an angiogram at some point in time however not urgent. Ok for diet from a vascular standpoint. Please call back with concerns     Electronically signed by Sapphire Loomis MD on 2/27/2025 at 5:30 PM    I spoke with patient  He does not want angiogram  I will set up follow up with Dr. Brenda Prescott MD

## 2025-02-27 NOTE — PROGRESS NOTES
Spoke with [facility] and the taylor was last placed on 2/1/25 and is due to be changed on 3/1/25 per medical records.

## 2025-02-27 NOTE — DISCHARGE INSTRUCTIONS
Future Appointments   Date Time Provider Department Center   3/4/2025 10:15 AM Baltazar Sterling MD CARDIO SURG [facility]

## 2025-02-27 NOTE — PROGRESS NOTES
[facility] Hospitalist Progress Note    Admitting Date and Time: 2/23/2025  7:41 PM  Admit Dx: CAD in native artery [I25.10]    Synopsis:  64 year old male with PMX of CVA with residual left hemiparesis, DM, HTN, HLD who initially presented to [facility] [place] for abdominal pain. He was subsequently found to have an inguinal hernia. General surgery was consulted at the time. He had no indication for urgent surgery. He does have a history of CAD and cardiology was consulted for cardiac clearance.  He had LHC a few years back and records were reviewed that he was recommended to have CABG at that time. He deferred the procedure back then. A stress test was performed during this admission which showed fixed defect with no inducible ischemia. He subsequently underwent LHC which showed RCA occlusion. He is now agreeable to CABG. Hence he was transferred [place] [facility] [place].     Subjective:  Patient is being followed for CAD in native artery [I25.10]     Patient was seen at bedside..   Denies any complaint  No acute overnight event    ROS: denies fever, chills, cp, sob, n/v, HA unless stated above.      [START ON 2/28/2025] ceFAZolin  2,000 mg IntraVENous On Call to OR    sodium chloride flush  5-40 mL IntraVENous 2 times per day    enoxaparin  40 mg SubCUTAneous Daily    atorvastatin  80 mg Oral Nightly    carvedilol  3.125 mg Oral BID with meals    [Held by provider] empagliflozin  10 mg Oral Daily    ferrous sulfate  325 mg Oral Daily with breakfast    folic acid  1,000 mcg Oral Daily    gabapentin  300 mg Oral BID    pantoprazole  40 mg Oral Daily    sodium bicarbonate  1,300 mg Oral BID    sodium chloride  1 g Oral TID WC    tamsulosin  0.4 mg Oral Nightly    vitamin C  500 mg Oral Daily    insulin lispro  0-4 Units SubCUTAneous 4x Daily AC & HS    polyethylene glycol  17 g Oral Daily    senna  1 tablet Oral BID    aspirin  81 mg Oral Daily    sodium chloride flush  5-40 mL IntraVENous 2 times

## 2025-02-27 NOTE — PROGRESS NOTES
OCCUPATIONAL THERAPY TREATMENT NOTE  [facility] [address] [place], [place]      Date:2025  Patient Name: Jerome Steel  MRN: [id number]  : 1960  Room: 74/7415     Per OT Eval:    Evaluating OT: Kalee Stanton OTR/L; [id number]        Referring Provider: Fernanda Puri MD     Specific Provider Orders/Date: OT Eval and Treat 25 1245        Diagnosis: Coronary artery disease due to lipid rich plaque;  Pre-op evaluation; PAD (peripheral artery disease).    Surgery: 25 heart cath.     Pertinent Medical History:  has a past medical history of Abscess of prostate, Anemia, Arthritis, Atherosclerotic heart disease of native coronary artery without angina pectoris, Bilateral carpal tunnel syndrome, Cerebral artery occlusion with cerebral infarction ([facility]), Chronic back pain, COPD (chronic obstructive pulmonary disease) ([facility]), Coronary artery disease involving native coronary artery of native heart without angina pectoris, CVA (cerebral vascular accident) ([facility]), Diabetes mellitus ([facility]), Diabetic foot ulcer with osteomyelitis ([facility]), 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 2 diabetes mellitus, with necrosis of bone ([facility]), Disease of multiple valves of heart, Dysphagia, EtOH dependence ([facility]), Falls, GERD (gastroesophageal reflux disease), Hemiparesis affecting left side as late effect of cerebrovascular accident ([facility]), Hydrocele, Hyperlipidemia, Hypertension, Hypothyroid, Inguinal hernia, MI (myocardial infarction) ([facility]), Moderate aortic regurgitation, Multiple fractures of rib involving four or more ribs, Obstructive and reflux uropathy, Osteomyelitis, ankle and foot ([facility]), Paralytic gait, Peripheral vascular angioplasty status, PFO (patent foramen ovale), Psychosis ([facility]), Schizophrenia ([facility]), Spondylosis, TIA (transient ischemic attack), Traumatic hemorrhage of cerebrum ([facility]), Ulcer of left heel, with necrosis  light for assistance and fall prevention. Pt able to sit EOB ~10 mins to increase core strength/balance/activity tolerance for ease with ADLs. Pt would benefit from continued skilled OT to increase safety and independence with completion of ADL/IADL tasks for functional independence and quality of life.    Treatment: OT treatment provided this date includes:   ADL- Instruction/training on safety and adapted techniques for completion of ADLs.    Transfers/Mobility- Instruction/training on safety and improved independence with bed mobility/functional transfers and functional mobility.   Sitting/Standing Balance/Tolerance: to increase balance and activity tolerance during ADLs and facilitate proper posture and positioning.   Activity tolerance- Instruction/training on energy conservation/work simplification for completion of ADLs.     Neuromuscular Reeducation to facilitate balance/righting reactions for increased function with ADLs tasks.   Cognitive retraining - Cues for safety, sequencing, and problem solving.    Skilled positioning/alignment-  Proper Positioning/Alignment for pressure relief, joint protection and to increase functional interaction with environment.    Skilled monitoring of Vital signs-   WNL    Pt has made fair progress towards set goals.   Continue with current plan of care    Treatment Time In:1310            Treatment Time Out: 1333             Treatment Charges: Mins Units   Ther Ex  78347     Manual Therapy 99102     Thera Activities 30378 15 1   ADL/Home Mgt 62436 8 1   Neuro Re-ed 92151     Group Therapy      Orthotic manage/training  10185     Non-Billable Time     Total Timed Treatment 23 2       Debby MOORE/YASMANY [id number]

## 2025-02-28 VITALS
SYSTOLIC BLOOD PRESSURE: 118 MMHG | RESPIRATION RATE: 16 BRPM | BODY MASS INDEX: 21.15 KG/M2 | WEIGHT: 147.71 LBS | OXYGEN SATURATION: 97 % | DIASTOLIC BLOOD PRESSURE: 57 MMHG | HEART RATE: 56 BPM | HEIGHT: 70 IN | TEMPERATURE: 98.1 F

## 2025-02-28 LAB
ANION GAP SERPL CALCULATED.3IONS-SCNC: 13 MMOL/L (ref 7–16)
BASOPHILS # BLD: 0.06 K/UL (ref 0–0.2)
BASOPHILS NFR BLD: 1 % (ref 0–2)
BUN SERPL-MCNC: 14 MG/DL (ref 6–23)
CALCIUM SERPL-MCNC: 9.1 MG/DL (ref 8.6–10.2)
CHLORIDE SERPL-SCNC: 101 MMOL/L (ref 98–107)
CO2 SERPL-SCNC: 23 MMOL/L (ref 22–29)
CREAT SERPL-MCNC: 0.6 MG/DL (ref 0.7–1.2)
ECHO BSA: 1.82 M2
EOSINOPHIL # BLD: 0.38 K/UL (ref 0.05–0.5)
EOSINOPHILS RELATIVE PERCENT: 6 % (ref 0–6)
ERYTHROCYTE [DISTWIDTH] IN BLOOD BY AUTOMATED COUNT: 16 % (ref 11.5–15)
GFR, ESTIMATED: >90 ML/MIN/1.73M2
GLUCOSE BLD-MCNC: 108 MG/DL (ref 74–99)
GLUCOSE BLD-MCNC: 115 MG/DL (ref 74–99)
GLUCOSE SERPL-MCNC: 94 MG/DL (ref 74–99)
HCT VFR BLD AUTO: 33.3 % (ref 37–54)
HGB BLD-MCNC: 10.6 G/DL (ref 12.5–16.5)
IMM GRANULOCYTES # BLD AUTO: 0.05 K/UL (ref 0–0.58)
IMM GRANULOCYTES NFR BLD: 1 % (ref 0–5)
LYMPHOCYTES NFR BLD: 1.78 K/UL (ref 1.5–4)
LYMPHOCYTES RELATIVE PERCENT: 26 % (ref 20–42)
MCH RBC QN AUTO: 28.6 PG (ref 26–35)
MCHC RBC AUTO-ENTMCNC: 31.8 G/DL (ref 32–34.5)
MCV RBC AUTO: 90 FL (ref 80–99.9)
MONOCYTES NFR BLD: 0.57 K/UL (ref 0.1–0.95)
MONOCYTES NFR BLD: 8 % (ref 2–12)
NEUTROPHILS NFR BLD: 59 % (ref 43–80)
NEUTS SEG NFR BLD: 4.03 K/UL (ref 1.8–7.3)
PLATELET # BLD AUTO: 252 K/UL (ref 130–450)
PMV BLD AUTO: 10.5 FL (ref 7–12)
POTASSIUM SERPL-SCNC: 3.9 MMOL/L (ref 3.5–5)
RBC # BLD AUTO: 3.7 M/UL (ref 3.8–5.8)
SODIUM SERPL-SCNC: 137 MMOL/L (ref 132–146)
WBC OTHER # BLD: 6.9 K/UL (ref 4.5–11.5)

## 2025-02-28 PROCEDURE — 6360000002 HC RX W HCPCS: Performed by: INTERNAL MEDICINE

## 2025-02-28 PROCEDURE — 6370000000 HC RX 637 (ALT 250 FOR IP): Performed by: INTERNAL MEDICINE

## 2025-02-28 PROCEDURE — 85025 COMPLETE CBC W/AUTO DIFF WBC: CPT

## 2025-02-28 PROCEDURE — 99239 HOSP IP/OBS DSCHRG MGMT >30: CPT

## 2025-02-28 PROCEDURE — 51702 INSERT TEMP BLADDER CATH: CPT

## 2025-02-28 PROCEDURE — 36415 COLL VENOUS BLD VENIPUNCTURE: CPT

## 2025-02-28 PROCEDURE — 2500000003 HC RX 250 WO HCPCS: Performed by: INTERNAL MEDICINE

## 2025-02-28 PROCEDURE — 99232 SBSQ HOSP IP/OBS MODERATE 35: CPT | Performed by: INTERNAL MEDICINE

## 2025-02-28 PROCEDURE — 82962 GLUCOSE BLOOD TEST: CPT

## 2025-02-28 PROCEDURE — 80048 BASIC METABOLIC PNL TOTAL CA: CPT

## 2025-02-28 RX ORDER — ASPIRIN 81 MG/1
81 TABLET, CHEWABLE ORAL DAILY
DISCHARGE
Start: 2025-03-01

## 2025-02-28 RX ORDER — POTASSIUM CHLORIDE 1500 MG/1
20 TABLET, EXTENDED RELEASE ORAL ONCE
Status: COMPLETED | OUTPATIENT
Start: 2025-02-28 | End: 2025-02-28

## 2025-02-28 RX ADMIN — GABAPENTIN 300 MG: 300 CAPSULE ORAL at 09:54

## 2025-02-28 RX ADMIN — FOLIC ACID 1000 MCG: 1 TABLET ORAL at 09:54

## 2025-02-28 RX ADMIN — SODIUM CHLORIDE, PRESERVATIVE FREE 10 ML: 5 INJECTION INTRAVENOUS at 09:53

## 2025-02-28 RX ADMIN — SODIUM BICARBONATE 1300 MG: 650 TABLET ORAL at 10:00

## 2025-02-28 RX ADMIN — PANTOPRAZOLE SODIUM 40 MG: 40 TABLET, DELAYED RELEASE ORAL at 09:54

## 2025-02-28 RX ADMIN — TRAMADOL HYDROCHLORIDE 50 MG: 50 TABLET, COATED ORAL at 05:26

## 2025-02-28 RX ADMIN — LOSARTAN POTASSIUM 25 MG: 25 TABLET, FILM COATED ORAL at 09:54

## 2025-02-28 RX ADMIN — SODIUM CHLORIDE 1 G: 1 TABLET ORAL at 10:01

## 2025-02-28 RX ADMIN — SODIUM CHLORIDE, PRESERVATIVE FREE 10 ML: 5 INJECTION INTRAVENOUS at 10:02

## 2025-02-28 RX ADMIN — ENOXAPARIN SODIUM 40 MG: 100 INJECTION SUBCUTANEOUS at 09:53

## 2025-02-28 RX ADMIN — Medication 500 MG: at 09:54

## 2025-02-28 RX ADMIN — POLYETHYLENE GLYCOL 3350 17 G: 17 POWDER, FOR SOLUTION ORAL at 09:54

## 2025-02-28 RX ADMIN — ASPIRIN 81 MG CHEWABLE TABLET 81 MG: 81 TABLET CHEWABLE at 09:54

## 2025-02-28 RX ADMIN — POTASSIUM CHLORIDE 20 MEQ: 1500 TABLET, EXTENDED RELEASE ORAL at 09:54

## 2025-02-28 RX ADMIN — TRAMADOL HYDROCHLORIDE 50 MG: 50 TABLET, COATED ORAL at 12:16

## 2025-02-28 RX ADMIN — FERROUS SULFATE TAB 325 MG (65 MG ELEMENTAL FE) 325 MG: 325 (65 FE) TAB at 09:54

## 2025-02-28 RX ADMIN — SENNOSIDES 8.6 MG: 8.6 TABLET, FILM COATED ORAL at 10:00

## 2025-02-28 ASSESSMENT — PAIN DESCRIPTION - LOCATION
LOCATION: LEG
LOCATION: FOOT;LEG;BACK

## 2025-02-28 ASSESSMENT — PAIN SCALES - GENERAL
PAINLEVEL_OUTOF10: 10
PAINLEVEL_OUTOF10: 8

## 2025-02-28 ASSESSMENT — PAIN DESCRIPTION - DESCRIPTORS
DESCRIPTORS: ACHING
DESCRIPTORS: ACHING;SQUEEZING;THROBBING

## 2025-02-28 ASSESSMENT — PAIN DESCRIPTION - ORIENTATION: ORIENTATION: RIGHT

## 2025-02-28 NOTE — CARE COORDINATION
Discharge order noted, spoke with My at [employer], their transport will pick pt up around 4-5pm. Notified charge RN, bedside RN, and pt. Pt will call contact. Envelope and ambullete form in soft chart.Aretha Mireles, MSW, LSW

## 2025-02-28 NOTE — PLAN OF CARE
Problem: Chronic Conditions and Co-morbidities  Goal: Patient's chronic conditions and co-morbidity symptoms are monitored and maintained or improved  2/28/2025 1325 by Kylah Prieto RN  Outcome: Progressing  2/28/2025 0011 by Alida Hyde RN  Outcome: Progressing     Problem: Discharge Planning  Goal: Discharge to home or other facility with appropriate resources  2/28/2025 1325 by Kylah Prieto RN  Outcome: Progressing  2/28/2025 0011 by Alida Hyde RN  Outcome: Progressing     Problem: Safety - Adult  Goal: Free from fall injury  2/28/2025 1325 by Kylah Prieto RN  Outcome: Progressing  2/28/2025 0011 by Alida Hyde RN  Outcome: Progressing     Problem: Skin/Tissue Integrity  Goal: Skin integrity remains intact  Description: 1.  Monitor for areas of redness and/or skin breakdown  2.  Assess vascular access sites hourly  3.  Every 4-6 hours minimum:  Change oxygen saturation probe site  4.  Every 4-6 hours:  If on nasal continuous positive airway pressure, respiratory therapy assess nares and determine need for appliance change or resting period  2/28/2025 1325 by Kylah Prieto RN  Outcome: Progressing  2/28/2025 0011 by Alida Hyde RN  Outcome: Progressing  Flowsheets (Taken 2/27/2025 2200)  Skin Integrity Remains Intact: Monitor for areas of redness and/or skin breakdown     Problem: Pain  Goal: Verbalizes/displays adequate comfort level or baseline comfort level  2/28/2025 1325 by Kylah Prieto RN  Outcome: Progressing  2/28/2025 0011 by Alida Hyde RN  Outcome: Progressing     Problem: Cardiovascular - Adult  Goal: Maintains optimal cardiac output and hemodynamic stability  2/28/2025 1325 by Kylah Prieto RN  Outcome: Progressing  2/28/2025 0011 by Alida Hyde RN  Outcome: Progressing  Goal: Absence of cardiac dysrhythmias or at baseline  2/28/2025 1325 by Kylah Prieto RN  Outcome: Progressing  2/28/2025 0011 by Alida Hyde RN  Outcome: Progressing     Problem:

## 2025-02-28 NOTE — PROGRESS NOTES
PROGRESS NOTE     CARDIOLOGY    Chief complaint: Seen today for follow up, management & recommendations for coronary artery disease, aortic stenosis.    He denies chest pain or shortness of breath today.      Wt Readings from Last 3 Encounters:   02/25/25 67 kg (147 lb 11.3 oz)   02/18/25 67.1 kg (148 lb)   12/23/24 77.9 kg (171 lb 12.8 oz)     Temp Readings from Last 3 Encounters:   02/28/25 97.5 °F (36.4 °C)   02/23/25 97.7 °F (36.5 °C)   12/31/24 98.1 °F (36.7 °C)     BP Readings from Last 3 Encounters:   02/28/25 115/69   02/26/25 (!) 95/59   02/23/25 (!) 117/59     Pulse Readings from Last 3 Encounters:   02/28/25 74   02/26/25 68   02/23/25 61         Intake/Output Summary (Last 24 hours) at 2/28/2025 1251  Last data filed at 2/28/2025 0830  Gross per 24 hour   Intake 240 ml   Output 1300 ml   Net -1060 ml       Recent Labs     02/28/25  0619   WBC 6.9   HGB 10.6*   HCT 33.3*   MCV 90.0        Recent Labs     02/28/25  0619      K 3.9      CO2 23   BUN 14   CREATININE 0.6*     No results for input(s): \"PROTIME\", \"INR\" in the last 72 hours.  No results for input(s): \"CKTOTAL\", \"CKMB\", \"CKMBINDEX\", \"TROPONINI\" in the last 72 hours.  No results for input(s): \"BNP\" in the last 72 hours.  No results for input(s): \"CHOL\", \"HDL\", \"TRIG\" in the last 72 hours.    Invalid input(s): \"CHOLHDLR\", \"LDLCALCU\"    No results for input(s): \"TROPHS\" in the last 72 hours.      ceFAZolin (ANCEF) 2,000 mg in sterile water 20 mL IV syringe, On Call to OR  sodium chloride flush 0.9 % injection 5-40 mL, 2 times per day  sodium chloride flush 0.9 % injection 5-40 mL, PRN  enoxaparin (LOVENOX) injection 40 mg, Daily  acetaminophen (TYLENOL) tablet 650 mg, Q6H PRN   Or  acetaminophen (TYLENOL) suppository 650 mg, Q6H PRN  ipratropium 0.5 mg-albuterol 2.5 mg (DUONEB) nebulizer solution 1 Dose, Q4H PRN  morphine (PF) injection 2 mg, Q4H PRN   Or  morphine sulfate (PF) injection 4 mg, Q4H PRN  atorvastatin (LIPITOR)  masses.  trachea midline.  Heart: Distant to auscultation.  Regular rate & rhythm, normal S1 & S2, 1-2/6 systolic ejection murmur.  No heave.  Lungs: CTA bilaterally.  Decreased air movement.  No accessory muscles.    Abd: soft, non-tender.  Normal bowel sounds.   Neuro: Full ROM X 4, EOMI, no tremors.  EXT: No bilateral lower extremity edema on exam  Skin: warm, dry, intact.  Good turgor.  Psych: A&O x 3, normal behavior, not anxious.      Assessment/Recommendations  Severe left main and multi vessel coronary artery disease.  Being evaluated by CT surgery.  Will defer management to CT surgery.  Aortic stenosis being evaluated by CT surgery.  LYRIC yesterday shows moderate aortic stenosis and mild aortic regurgitation.  Right heart catheterization showed a cardiac index of 2.7 and 2.5 with a wedge of 8.  Hypertension  Hypercholesterolemia  Peripheral vascular disease  Diabetes  Hypothyroidism  Peptic ulcer disease with prior GI bleed.  Chronic anemia  COPD  Foot ulcer  CVA in 2015  Follow-up subarachnoid hemorrhage 6-24  Inguinal hernia  Replace potassium.    Note: This report was completed using computerized voice recognition software. Every effort has been made to ensure accuracy, however; and invert and computerized transcription errors may be present.

## 2025-02-28 NOTE — PROGRESS NOTES
CC: Hernia     Brief HPI:  Patient seen and discussed with Dr. Peoples. No complaints       Past Medical History:   Diagnosis Date    Abscess of prostate     Anemia     Arthritis     Atherosclerotic heart disease of native coronary artery without angina pectoris     Bilateral carpal tunnel syndrome 2016    Cerebral artery occlusion with cerebral infarction ([facility])     Chronic back pain     COPD (chronic obstructive pulmonary disease) ([facility])     Coronary artery disease involving native coronary artery of native heart without angina pectoris 09/16/2022    CVA (cerebral vascular accident) ([facility]) 11/2015, 2017    Diabetes mellitus ([facility])     Type 2    Diabetic foot ulcer with osteomyelitis ([facility]) 12/21/2021    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 2 diabetes mellitus, with necrosis of bone ([facility]) 12/21/2021    Disease of multiple valves of heart     Dysphagia     EtOH dependence ([facility])     Falls     GERD (gastroesophageal reflux disease)     Hemiparesis affecting left side as late effect of cerebrovascular accident ([facility])     LEFT SIDE NON DOMINANT FOLLOWING STROKE    Hydrocele     Hyperlipidemia     Hypertension     Hypothyroid     Inguinal hernia     MI (myocardial infarction) ([facility])     Moderate aortic regurgitation     Multiple fractures of rib involving four or more ribs     left    Obstructive and reflux uropathy     Osteomyelitis, ankle and foot ([facility])     left    Paralytic gait     Peripheral vascular angioplasty status 12/21/2021    PFO (patent foramen ovale)     Psychosis ([facility])     Schizophrenia ([facility])     Spondylosis     TIA (transient ischemic attack)     Traumatic hemorrhage of cerebrum ([facility])     Ulcer of left heel, with necrosis of bone ([facility]) 12/27/2021    Ulcer of left heel, with necrosis of muscle ([facility]) 12/21/2021    UTI (urinary tract infection)     Vitamin D deficiency      Past Surgical History:   Procedure Laterality Date    CARDIAC PROCEDURE N/A 2/26/2025    Right heart cath performed by Krissy Liao

## 2025-02-28 NOTE — PROGRESS NOTES
Department of Podiatry  Progress Note    SUBJECTIVE:  Jerome Steel is seen at bedside for right heel ulceration. No acute events overnight. Patient denies any N/V/D/F/C/SOB/CP. Patient states no new pain or sensation to either lower extremity. No other pedal complaints at this time.     OBJECTIVE:    Scheduled Meds:   ceFAZolin  2,000 mg IntraVENous On Call to OR    sodium chloride flush  5-40 mL IntraVENous 2 times per day    enoxaparin  40 mg SubCUTAneous Daily    atorvastatin  80 mg Oral Nightly    carvedilol  3.125 mg Oral BID with meals    [Held by provider] empagliflozin  10 mg Oral Daily    ferrous sulfate  325 mg Oral Daily with breakfast    folic acid  1,000 mcg Oral Daily    gabapentin  300 mg Oral BID    pantoprazole  40 mg Oral Daily    sodium bicarbonate  1,300 mg Oral BID    sodium chloride  1 g Oral TID WC    tamsulosin  0.4 mg Oral Nightly    vitamin C  500 mg Oral Daily    insulin lispro  0-4 Units SubCUTAneous 4x Daily AC & HS    polyethylene glycol  17 g Oral Daily    senna  1 tablet Oral BID    aspirin  81 mg Oral Daily    sodium chloride flush  5-40 mL IntraVENous 2 times per day    losartan  25 mg Oral Daily     Continuous Infusions:   dextrose      sodium chloride       PRN Meds:.sodium chloride flush, acetaminophen **OR** acetaminophen, ipratropium 0.5 mg-albuterol 2.5 mg, morphine **OR** morphine, glucose, dextrose bolus **OR** dextrose bolus, glucagon (rDNA), dextrose, traMADol, sulfur hexafluoride microspheres, sodium chloride flush, sodium chloride    Allergies   Allergen Reactions    Pcn [Penicillins] Anaphylaxis       /69   Pulse 74   Temp 97.5 °F (36.4 °C)   Resp 16   Ht 1.778 m (5' 10\")   Wt 67 kg (147 lb 11.3 oz)   SpO2 97%   BMI 21.19 kg/m²       EXAM:    VASCULAR:  DP and PT pulses are palpable but diminished B/L, 1/4. CFT < 5 seconds B/L.  Warm to lukewarm from the tibial tuberosity to the distal aspect of the digits dorsally.  Varicosities noted to bilateral

## 2025-02-28 NOTE — DISCHARGE SUMMARY
[facility] Hospitalist Physician Discharge Summary       [facility]  [address]  [place] [address]  [phone]        Aparna Bains, APRN - CNP  [address] [address]  [phone]    Follow up in 1 week(s)  please make a appointment regarding discussion regarding CABG    Genet Kraus, DO  [address]  [address]  [place] [address]  [phone]    Follow up in 1 week(s)      Genet Kraus, DO  [address]  [address]  [facility] [address]  [phone]            Activity level: As tolerated     Dispo: SNF    Condition on discharge: Stable     Patient ID:  Jerome Steel  [id number]  64 y.o.  1960    Admit date: 2/23/2025    Discharge date and time:  2/28/2025  11:18 AM    Admission Diagnoses: Principal Problem:    Coronary artery disease due to lipid rich plaque  Active Problems:    Severe protein-energy malnutrition    Pre-op evaluation    PAD (peripheral artery disease)  Resolved Problems:    * No resolved hospital problems. *      Discharge Diagnoses: Principal Problem:    Coronary artery disease due to lipid rich plaque  Active Problems:    Severe protein-energy malnutrition    Pre-op evaluation    PAD (peripheral artery disease)  Resolved Problems:    * No resolved hospital problems. *      Consults:  IP CONSULT TO CARDIOLOGY  IP CONSULT TO CARDIOTHORACIC SURGERY  IP CONSULT TO PODIATRY  IP CONSULT TO VASCULAR SURGERY  IP CONSULT TO DIETITIAN  IP CONSULT TO GENERAL SURGERY    Procedures: LYRIC and right heart catheterization.    Hospital Course:   Patient Jerome Steel is a 64 y.o. presented with CAD in native artery [I25.10]    64 year old male with PMX of CVA with residual left hemiparesis, DM, HTN, HLD who initially presented to [facility] for abdominal pain. He was subsequently found to have an inguinal hernia. General surgery was consulted at the time. He had no indication for urgent surgery. He does

## 2025-02-28 NOTE — PLAN OF CARE
Problem: Chronic Conditions and Co-morbidities  Goal: Patient's chronic conditions and co-morbidity symptoms are monitored and maintained or improved  2/28/2025 0011 by Alida Hyde RN  Outcome: Progressing  2/27/2025 1500 by Mariann Rodríguez RN  Outcome: Progressing     Problem: Discharge Planning  Goal: Discharge to home or other facility with appropriate resources  2/28/2025 0011 by Alida Hyde RN  Outcome: Progressing  2/27/2025 1500 by Mariann Rodríguez RN  Outcome: Progressing     Problem: Safety - Adult  Goal: Free from fall injury  2/28/2025 0011 by Alida Hyde RN  Outcome: Progressing  2/27/2025 1500 by Mariann Rodríguez RN  Outcome: Progressing     Problem: Skin/Tissue Integrity  Goal: Skin integrity remains intact  Description: 1.  Monitor for areas of redness and/or skin breakdown  2.  Assess vascular access sites hourly  3.  Every 4-6 hours minimum:  Change oxygen saturation probe site  4.  Every 4-6 hours:  If on nasal continuous positive airway pressure, respiratory therapy assess nares and determine need for appliance change or resting period  2/28/2025 0011 by Alida Hyde RN  Outcome: Progressing  Flowsheets (Taken 2/27/2025 2200)  Skin Integrity Remains Intact: Monitor for areas of redness and/or skin breakdown  2/27/2025 1500 by Mariann Rodríguez RN  Outcome: Progressing     Problem: Pain  Goal: Verbalizes/displays adequate comfort level or baseline comfort level  2/28/2025 0011 by Alida Hyde RN  Outcome: Progressing  2/27/2025 1500 by Mariann Rodríguez RN  Outcome: Progressing     Problem: Cardiovascular - Adult  Goal: Maintains optimal cardiac output and hemodynamic stability  2/28/2025 0011 by Alida Hyde RN  Outcome: Progressing  2/27/2025 1500 by Mariann Rodríguez RN  Outcome: Progressing  Goal: Absence of cardiac dysrhythmias or at baseline  2/28/2025 0011 by Alida Hyde RN  Outcome: Progressing  2/27/2025 1500 by Mariann Rodríguez RN  Outcome:  Progressing     Problem: Genitourinary - Adult  Goal: Urinary catheter remains patent  2/28/2025 0011 by Alida Hyde RN  Outcome: Progressing  2/27/2025 1500 by Mariann Rodríguez RN  Outcome: Progressing     Problem: Infection - Adult  Goal: Absence of infection at discharge  2/28/2025 0011 by Alida Hyde RN  Outcome: Progressing  2/27/2025 1500 by Mariann Rodríguez RN  Outcome: Progressing  Goal: Absence of infection during hospitalization  2/28/2025 0011 by Alida Hyde RN  Outcome: Progressing  2/27/2025 1500 by Mariann Rodríguez RN  Outcome: Progressing  Goal: Absence of fever/infection during anticipated neutropenic period  2/28/2025 0011 by Alida Hdye RN  Outcome: Progressing  2/27/2025 1500 by Mariann Rodríguez RN  Outcome: Progressing     Problem: Hematologic - Adult  Goal: Maintains hematologic stability  2/28/2025 0011 by Alida Hyde RN  Outcome: Progressing  2/27/2025 1500 by Mariann Rodríguez RN  Outcome: Progressing     Problem: Nutrition Deficit:  Goal: Optimize nutritional status  2/28/2025 0011 by Alida Hyde RN  Outcome: Progressing  2/27/2025 1500 by Mariann Rodríguez RN  Outcome: Progressing

## 2025-03-03 NOTE — PROGRESS NOTES
Physician Progress Note      PATIENT:               LUCIO ORELLANA  CSN #:                  [id number]  :                       1960  ADMIT DATE:       2025 7:41 PM  DISCH DATE:        2025 4:16 PM  RESPONDING  PROVIDER #:        Rober Harrell DPM          QUERY TEXT:    Per notes - documentation of Right heel decubitus ulcer, POA. If   possible, please document in progress notes the stage of the ulcer, if known:    The medical record reflects the following:  Risk Factors: DM, PAD, Neuropathy  Clinical Indicators: Per notes - \"Decubitus ulceration right heel,   POA; Right pressure injury of the heel\".  Treatment: Xeroform, DSD; daily changes, Elevate heels at all times while in   bed.    Stage 1:  Non-blanchable erythema of intact skin  Stage 2:  Abrasion, Blister, Partial-thickness skin loss, with exposed dermis  Stage 3:  Full-thickness skin loss with damage or necrosis of subcutaneous   tissue  Stage 4:  Full-thickness skin & soft tissue loss through to underlying muscle,   tendon or bone  Unstageable: Obscured full-thickness skin & tissue loss  Options provided:  -- Stage 1 Pressure Ulcer of right heel present on admission  -- Stage 2 Pressure Ulcer of right heel present on admission  -- Stage 3 Pressure Ulcer of right heel present on admission  -- Stage 4 Pressure Ulcer of right heel present on admission  -- Unstageable Pressure Ulcer right heel present on admission  -- Other - I will add my own diagnosis  -- Disagree - Not applicable / Not valid  -- Disagree - Clinically unable to determine / Unknown  -- Refer to Clinical Documentation Reviewer    PROVIDER RESPONSE TEXT:    This patient has a Stage 3 pressure ulcer of the right heel which was present   on admission.    Query created by: Lauren Archibald on 3/3/2025 8:46 AM      Electronically signed by:  Rober Harrell DPM 3/3/2025 10:58 AM

## 2025-03-05 ENCOUNTER — TELEPHONE (OUTPATIENT)
Dept: VASCULAR SURGERY | Age: 65
End: 2025-03-05

## 2025-03-05 NOTE — TELEPHONE ENCOUNTER
Mailed appointment card to patient for appointment 3-31-25 at 11:15 am, patient didn't answer phone and there was  no voicemail.

## 2025-03-18 ENCOUNTER — PREP FOR PROCEDURE (OUTPATIENT)
Dept: CARDIOTHORACIC SURGERY | Age: 65
End: 2025-03-18

## 2025-03-18 ENCOUNTER — INITIAL CONSULT (OUTPATIENT)
Dept: CARDIOTHORACIC SURGERY | Age: 65
End: 2025-03-18
Payer: COMMERCIAL

## 2025-03-18 VITALS
WEIGHT: 166 LBS | SYSTOLIC BLOOD PRESSURE: 102 MMHG | DIASTOLIC BLOOD PRESSURE: 61 MMHG | HEART RATE: 60 BPM | BODY MASS INDEX: 23.82 KG/M2

## 2025-03-18 DIAGNOSIS — I25.10 CAD IN NATIVE ARTERY: Primary | ICD-10-CM

## 2025-03-18 DIAGNOSIS — Z01.818 PREOPERATIVE TESTING: Primary | ICD-10-CM

## 2025-03-18 DIAGNOSIS — I25.10 CORONARY ARTERY DISEASE INVOLVING NATIVE CORONARY ARTERY OF NATIVE HEART WITHOUT ANGINA PECTORIS: ICD-10-CM

## 2025-03-18 PROCEDURE — 3078F DIAST BP <80 MM HG: CPT | Performed by: THORACIC SURGERY (CARDIOTHORACIC VASCULAR SURGERY)

## 2025-03-18 PROCEDURE — 1123F ACP DISCUSS/DSCN MKR DOCD: CPT | Performed by: THORACIC SURGERY (CARDIOTHORACIC VASCULAR SURGERY)

## 2025-03-18 PROCEDURE — 3074F SYST BP LT 130 MM HG: CPT | Performed by: THORACIC SURGERY (CARDIOTHORACIC VASCULAR SURGERY)

## 2025-03-18 PROCEDURE — 99214 OFFICE O/P EST MOD 30 MIN: CPT | Performed by: THORACIC SURGERY (CARDIOTHORACIC VASCULAR SURGERY)

## 2025-03-18 RX ORDER — SODIUM CHLORIDE 9 MG/ML
INJECTION, SOLUTION INTRAVENOUS PRN
Status: CANCELLED | OUTPATIENT
Start: 2025-03-18

## 2025-03-18 RX ORDER — SODIUM CHLORIDE 9 MG/ML
INJECTION, SOLUTION INTRAVENOUS CONTINUOUS
Status: CANCELLED | OUTPATIENT
Start: 2025-03-18

## 2025-03-18 RX ORDER — SODIUM CHLORIDE 0.9 % (FLUSH) 0.9 %
5-40 SYRINGE (ML) INJECTION PRN
Status: CANCELLED | OUTPATIENT
Start: 2025-03-18

## 2025-03-18 RX ORDER — SODIUM CHLORIDE 0.9 % (FLUSH) 0.9 %
5-40 SYRINGE (ML) INJECTION EVERY 12 HOURS SCHEDULED
Status: CANCELLED | OUTPATIENT
Start: 2025-03-18

## 2025-03-18 NOTE — PROGRESS NOTES
Cardiothoracic Surgery       Subjective     Patient ID: Jerome Steel     CC: MVCAD    HPI:  Jerome Steel is a 64 y.o. male with pertinent past medical history of CAD previously refused CABG 2023 at , Aortic stenosis, HTN, HLD, PVD with arthrectomies and arthroplasties, DM, hypothyroidism, prior ETOH abuse, CVA 2015 with residual right hemiparesis, subarachnoid hemorrhage 2024, GERD, right inguinal incarcerated hernia, right heel diabetic foot ulcer, falls, prostate abscess with chronic urinary catheter, psychosis and schizophrenia currently residing in a nursing home previously seen in consultation while inpatient who presents to the office to discuss surgical intervention regarding MVCAD.     Patient presented to the ER on 2/16/2025 with complaints of abdominal pain. He was found to have a SBO related to right inguinal hernia as well as bibasilar pneumonia. He was undergoing cardiac work up for clearance for hernia repair which prompted stress test as well as LHC (see reports below). Due to findings on LHC of severe LM disease, he was transferred to [facility] for CTS consultation. Further workup was completed, including LYRIC and RHC (see report below) and it was determined to allow the patient to discharge back to the nursing home and return to the office to discuss and schedule surgery.  Patient currently denies any complaints of CP, PAN, SOB at rest, new LE edema, palpitations or syncope.     Lexiscan 2/18/2025 (Dr. Santiago):   Negative for ischemia. Overall findings suggestive of cardiac event. EF 59%. mild severity left ventricular stress perfusion defect that is small in size present in the inferior segment(s) that is fixed, suggest prion Mi vs attenuation.      LHC 2/21/2025 (Dr. Santiago):   Coronary angiography:  Left main artery: Severe 8% diffuse stenosis across the left main artery with heavy 
Cardiac book given to pt to review at rehab. Office number provided to call with any questions.     LACY KurtzN, CV-BC, RN  ERAS Cardiac Clinical Coordinator    
ulcer      Assessment    CAD, AS      Plan    He is in rehab and getting stronger apparently.  He has no other option other than surgery.  He has significant ascending aortic calcification so I do not think his aorta can be clamped.  So I would plan a pump assist CABG x 2 (LIMA-LAD, SVG-LPL) and leave his AS for future TAVR.  All risks, benefits, alternatives and potential complications explained thoroughly including, but not limited to, bleeding, infection, lung injury, kidney injury, stroke, heart attack, prolonged ventilation, wound complication, need for re-operation, and death, and the patient agrees to proceed.  PAT on 4/10 with OR 4/17 at 7am.      Note: Greater than or equal to 39 minutes was spent providing face-to-face patient care, including:  and coordinating care, reviewing the chart, labs, and diagnostics, as well as medical decision making. Greater than 50% of this time was spent instructing and counseling the patient face to face regarding findings and recommendations.

## 2025-03-18 NOTE — H&P
HPI:  Jerome Steel is a 64 y.o. male with pertinent past medical history of CAD previously refused CABG 2023 at , Aortic stenosis, HTN, HLD, PVD with arthrectomies and arthroplasties, DM, hypothyroidism, prior ETOH abuse, CVA 2015 with residual right hemiparesis, subarachnoid hemorrhage 2024, GERD, right inguinal incarcerated hernia, right heel diabetic foot ulcer, falls, prostate abscess with chronic urinary catheter, psychosis and schizophrenia currently residing in a nursing home previously seen in consultation while inpatient who presents to the office to discuss surgical intervention regarding MVCAD.      Patient presented to the ER on 2/16/2025 with complaints of abdominal pain. He was found to have a SBO related to right inguinal hernia as well as bibasilar pneumonia. He was undergoing cardiac work up for clearance for hernia repair which prompted stress test as well as LHC (see reports below). Due to findings on LHC of severe LM disease, he was transferred to [facility] for CTS consultation. Further workup was completed, including LYRIC and RHC (see report below) and it was determined to allow the patient to discharge back to the nursing home and return to the office to discuss and schedule surgery.  Patient currently denies any complaints of CP, PAN, SOB at rest, new LE edema, palpitations or syncope.      Lexiscan 2/18/2025 (Dr. Santiago):   Negative for ischemia. Overall findings suggestive of cardiac event. EF 59%. mild severity left ventricular stress perfusion defect that is small in size present in the inferior segment(s) that is fixed, suggest prion Mi vs attenuation.       LHC 2/21/2025 (Dr. Santiago):   Coronary angiography:  Left main artery: Severe 8% diffuse stenosis across the left main artery with heavy calcification of the artery   Left anterior descending vessel:.  Normal sized vessel proximally.  There is severe 80% stenosis seen in the mid segment of the vessel right after

## 2025-03-21 DIAGNOSIS — G89.29 CHRONIC BILATERAL LOW BACK PAIN WITHOUT SCIATICA: Primary | ICD-10-CM

## 2025-03-21 DIAGNOSIS — M54.50 CHRONIC BILATERAL LOW BACK PAIN WITHOUT SCIATICA: Primary | ICD-10-CM

## 2025-03-21 NOTE — TELEPHONE ENCOUNTER
Name of Medication(s) Requested:  Requested Prescriptions     Pending Prescriptions Disp Refills    traMADol (ULTRAM) 50 MG tablet 90 tablet 0     Sig: Take 1 tablet by mouth every 8 hours as needed for Pain. Max Daily Amount: 150 mg       Medication is on current medication list Yes    Dosage and directions were verified? Yes    Quantity verified: 30 day supply     Pharmacy Verified?  Yes    Last Appointment:  9/30/2024    Future appts:  Future Appointments   Date Time Provider Department Center   3/31/2025 11:15 AM Maida Gutierrez MD [facility]        (If no appt send self scheduling link. .REFILLAPPT)  Scheduling request sent?     [] Yes  [x] No    Does patient need updated?  [] Yes  [x] No

## 2025-03-24 RX ORDER — TRAMADOL HYDROCHLORIDE 50 MG/1
50 TABLET ORAL EVERY 8 HOURS PRN
Qty: 90 TABLET | Refills: 0 | Status: SHIPPED | OUTPATIENT
Start: 2025-03-24 | End: 2025-04-23

## 2025-03-26 ENCOUNTER — TELEPHONE (OUTPATIENT)
Dept: CARDIOTHORACIC SURGERY | Age: 65
End: 2025-03-26

## 2025-03-26 PROBLEM — Z01.818 PRE-OP EVALUATION: Status: RESOLVED | Noted: 2025-02-24 | Resolved: 2025-03-26

## 2025-03-26 NOTE — TELEPHONE ENCOUNTER
Spoke to pt to inform him of PAT 4/10 @ 10:30AM. He is now home from rehab. Has an OV with HAJA 4/3.

## 2025-03-27 PROBLEM — I35.0 AORTIC VALVE STENOSIS: Status: ACTIVE | Noted: 2023-10-04

## 2025-04-01 DIAGNOSIS — I10 PRIMARY HYPERTENSION: ICD-10-CM

## 2025-04-01 DIAGNOSIS — Z86.73 HISTORY OF STROKE: ICD-10-CM

## 2025-04-01 DIAGNOSIS — I73.9 PERIPHERAL ARTERIAL DISEASE: ICD-10-CM

## 2025-04-01 DIAGNOSIS — E11.9 TYPE 2 DIABETES MELLITUS WITHOUT COMPLICATION, WITHOUT LONG-TERM CURRENT USE OF INSULIN: ICD-10-CM

## 2025-04-01 RX ORDER — ATORVASTATIN CALCIUM 80 MG/1
80 TABLET, FILM COATED ORAL DAILY
Qty: 90 TABLET | Refills: 3 | Status: SHIPPED | OUTPATIENT
Start: 2025-04-01

## 2025-04-01 RX ORDER — LOSARTAN POTASSIUM 50 MG/1
100 TABLET ORAL DAILY
Qty: 180 TABLET | Refills: 1 | Status: SHIPPED
Start: 2025-04-01 | End: 2025-04-02

## 2025-04-01 NOTE — TELEPHONE ENCOUNTER
Name of Medication(s) Requested:  Requested Prescriptions     Pending Prescriptions Disp Refills    atorvastatin (LIPITOR) 80 MG tablet 90 tablet 3     Sig: Take 1 tablet by mouth daily    metFORMIN (GLUCOPHAGE) 500 MG tablet 180 tablet 1     Sig: Take 1 tablet by mouth 2 times daily (with meals)    losartan (COZAAR) 50 MG tablet 180 tablet 1     Sig: Take 2 tablets by mouth daily       Medication is on current medication list Yes    Dosage and directions were verified? Yes    Quantity verified: 90 day supply     Pharmacy Verified?  Yes    Last Appointment:  Visit date not found    Future appts:  Future Appointments   Date Time Provider Department Center   4/10/2025 10:30 AM ANDRÉS [facility] ROOM 1 [facility]        (If no appt send self scheduling link. .REFILLAPPT)  Scheduling request sent?     [] Yes  [x] No    Does patient need updated?  [] Yes  [x] No

## 2025-04-02 RX ORDER — LOSARTAN POTASSIUM 100 MG/1
100 TABLET ORAL DAILY
Status: ON HOLD | COMMUNITY
End: 2025-04-23 | Stop reason: HOSPADM

## 2025-04-03 ENCOUNTER — TELEPHONE (OUTPATIENT)
Dept: PRIMARY CARE CLINIC | Age: 65
End: 2025-04-03

## 2025-04-03 NOTE — TELEPHONE ENCOUNTER
Received call from Lynne with [facility].  She stated patient fell yesterday from a standing position.  He sustained no injuries and she denied and LOC.  Patient was on floor for approximately 7 hours before he could get help.

## 2025-04-04 NOTE — TELEPHONE ENCOUNTER
If he is having any symptoms, I recommend being seen in the ER.  Being on the ground for 7 hours can cause conditions like rhabdomyolysis which can cause kidney failure.

## 2025-04-07 DIAGNOSIS — I10 PRIMARY HYPERTENSION: ICD-10-CM

## 2025-04-07 DIAGNOSIS — Z76.0 MEDICATION REFILL: ICD-10-CM

## 2025-04-07 RX ORDER — CARVEDILOL 3.12 MG/1
3.12 TABLET ORAL 2 TIMES DAILY WITH MEALS
Qty: 180 TABLET | Refills: 1 | Status: SHIPPED | OUTPATIENT
Start: 2025-04-07

## 2025-04-07 RX ORDER — GABAPENTIN 300 MG/1
300 CAPSULE ORAL 2 TIMES DAILY
Qty: 180 CAPSULE | Refills: 1 | Status: SHIPPED | OUTPATIENT
Start: 2025-04-07 | End: 2025-10-04

## 2025-04-07 RX ORDER — AMLODIPINE BESYLATE 2.5 MG/1
2.5 TABLET ORAL DAILY
Qty: 90 TABLET | Refills: 1 | Status: SHIPPED | OUTPATIENT
Start: 2025-04-07

## 2025-04-07 RX ORDER — TAMSULOSIN HYDROCHLORIDE 0.4 MG/1
0.4 CAPSULE ORAL NIGHTLY
Qty: 90 CAPSULE | Refills: 1 | Status: SHIPPED | OUTPATIENT
Start: 2025-04-07

## 2025-04-07 RX ORDER — FAMOTIDINE 40 MG/1
40 TABLET, FILM COATED ORAL DAILY
Qty: 90 TABLET | Refills: 1 | Status: SHIPPED | OUTPATIENT
Start: 2025-04-07

## 2025-04-07 NOTE — TELEPHONE ENCOUNTER
Name of Medication(s) Requested:  Requested Prescriptions     Pending Prescriptions Disp Refills    famotidine (PEPCID) 40 MG tablet 90 tablet 1     Sig: Take 1 tablet by mouth daily    tamsulosin (FLOMAX) 0.4 MG capsule 90 capsule 1     Sig: Take 1 capsule by mouth nightly    carvedilol (COREG) 3.125 MG tablet 180 tablet 1     Sig: Take 1 tablet by mouth with breakfast and with evening meal    amLODIPine (NORVASC) 2.5 MG tablet 90 tablet 1     Sig: Take 1 tablet by mouth daily    gabapentin (NEURONTIN) 300 MG capsule 180 capsule 1     Sig: Take 1 capsule by mouth 2 times daily for 180 days. Intended supply: 90 days       Medication is on current medication list Yes    Dosage and directions were verified? Yes    Quantity verified: 90 day supply     Pharmacy Verified?  Yes    Last Appointment:  9/30/2024    Future appts:  Future Appointments   Date Time Provider Department Center   4/10/2025 10:30 AM [facility] ROOM 1 [facility]        (If no appt send self scheduling link. .REFILLAPPT)  Scheduling request sent?     [] Yes  [x] No    Does patient need updated?  [] Yes  [x] No

## 2025-04-08 ENCOUNTER — APPOINTMENT (OUTPATIENT)
Dept: CARDIOLOGY | Facility: CLINIC | Age: 65
End: 2025-04-08
Payer: MEDICARE

## 2025-04-08 ENCOUNTER — ANESTHESIA EVENT (OUTPATIENT)
Dept: OPERATING ROOM | Age: 65
End: 2025-04-08
Payer: COMMERCIAL

## 2025-04-10 ENCOUNTER — HOSPITAL ENCOUNTER (OUTPATIENT)
Dept: GENERAL RADIOLOGY | Age: 65
Discharge: HOME | End: 2025-04-12
Payer: COMMERCIAL

## 2025-04-10 ENCOUNTER — HOSPITAL ENCOUNTER (OUTPATIENT)
Age: 65
Discharge: HOME | End: 2025-04-12
Payer: COMMERCIAL

## 2025-04-10 ENCOUNTER — HOSPITAL ENCOUNTER (OUTPATIENT)
Age: 65
Discharge: HOME | End: 2025-04-10
Payer: COMMERCIAL

## 2025-04-10 ENCOUNTER — HOSPITAL ENCOUNTER (OUTPATIENT)
Dept: PREADMISSION TESTING | Age: 65
Discharge: HOME | End: 2025-04-10
Payer: COMMERCIAL

## 2025-04-10 VITALS
BODY MASS INDEX: 23.77 KG/M2 | DIASTOLIC BLOOD PRESSURE: 62 MMHG | WEIGHT: 166 LBS | HEART RATE: 75 BPM | HEIGHT: 70 IN | SYSTOLIC BLOOD PRESSURE: 122 MMHG | TEMPERATURE: 96.9 F | RESPIRATION RATE: 18 BRPM | OXYGEN SATURATION: 100 %

## 2025-04-10 DIAGNOSIS — Z01.812 PRE-OPERATIVE LABORATORY EXAMINATION: Primary | ICD-10-CM

## 2025-04-10 DIAGNOSIS — Z01.818 PREOPERATIVE TESTING: ICD-10-CM

## 2025-04-10 DIAGNOSIS — I25.10 CORONARY ARTERY DISEASE INVOLVING NATIVE CORONARY ARTERY OF NATIVE HEART WITHOUT ANGINA PECTORIS: ICD-10-CM

## 2025-04-10 LAB
ABO + RH BLD: NORMAL
ALBUMIN SERPL-MCNC: 4 G/DL (ref 3.5–5.2)
ALP SERPL-CCNC: 165 U/L (ref 40–129)
ALT SERPL-CCNC: 13 U/L (ref 0–40)
ANION GAP SERPL CALCULATED.3IONS-SCNC: 14 MMOL/L (ref 7–16)
ARM BAND NUMBER: NORMAL
AST SERPL-CCNC: 20 U/L (ref 0–39)
BILIRUB SERPL-MCNC: 0.5 MG/DL (ref 0–1.2)
BLOOD BANK SAMPLE EXPIRATION: NORMAL
BLOOD GROUP ANTIBODIES SERPL: NEGATIVE
BUN SERPL-MCNC: 11 MG/DL (ref 6–23)
CALCIUM SERPL-MCNC: 9.5 MG/DL (ref 8.6–10.2)
CHLORIDE SERPL-SCNC: 94 MMOL/L (ref 98–107)
CO2 SERPL-SCNC: 22 MMOL/L (ref 22–29)
CREAT SERPL-MCNC: 0.7 MG/DL (ref 0.7–1.2)
EKG ATRIAL RATE: 69 BPM
EKG P AXIS: 30 DEGREES
EKG P-R INTERVAL: 170 MS
EKG Q-T INTERVAL: 416 MS
EKG QRS DURATION: 88 MS
EKG QTC CALCULATION (BAZETT): 445 MS
EKG R AXIS: 9 DEGREES
EKG T AXIS: 24 DEGREES
EKG VENTRICULAR RATE: 69 BPM
ERYTHROCYTE [DISTWIDTH] IN BLOOD BY AUTOMATED COUNT: 16.1 % (ref 11.5–15)
GFR, ESTIMATED: >90 ML/MIN/1.73M2
GLUCOSE SERPL-MCNC: 97 MG/DL (ref 74–99)
HBA1C MFR BLD: 5.9 % (ref 4–5.6)
HCT VFR BLD AUTO: 35.2 % (ref 37–54)
HGB BLD-MCNC: 11.2 G/DL (ref 12.5–16.5)
INR PPP: 1.1
MCH RBC QN AUTO: 28.5 PG (ref 26–35)
MCHC RBC AUTO-ENTMCNC: 31.8 G/DL (ref 32–34.5)
MCV RBC AUTO: 89.6 FL (ref 80–99.9)
PARTIAL THROMBOPLASTIN TIME: 32.5 SEC (ref 24.5–35.1)
PLATELET # BLD AUTO: 301 K/UL (ref 130–450)
PMV BLD AUTO: 9.7 FL (ref 7–12)
POTASSIUM SERPL-SCNC: 4.6 MMOL/L (ref 3.5–5)
PROT SERPL-MCNC: 8 G/DL (ref 6.4–8.3)
PROTHROMBIN TIME: 12.2 SEC (ref 9.3–12.4)
RBC # BLD AUTO: 3.93 M/UL (ref 3.8–5.8)
SODIUM SERPL-SCNC: 130 MMOL/L (ref 132–146)
T4 FREE SERPL-MCNC: 1.6 NG/DL (ref 0.9–1.7)
TSH SERPL DL<=0.05 MIU/L-ACNC: 0.93 UIU/ML (ref 0.27–4.2)
WBC OTHER # BLD: 11.2 K/UL (ref 4.5–11.5)

## 2025-04-10 PROCEDURE — 80053 COMPREHEN METABOLIC PANEL: CPT

## 2025-04-10 PROCEDURE — 86901 BLOOD TYPING SEROLOGIC RH(D): CPT

## 2025-04-10 PROCEDURE — 71046 X-RAY EXAM CHEST 2 VIEWS: CPT

## 2025-04-10 PROCEDURE — 36415 COLL VENOUS BLD VENIPUNCTURE: CPT

## 2025-04-10 PROCEDURE — 87081 CULTURE SCREEN ONLY: CPT

## 2025-04-10 PROCEDURE — 86850 RBC ANTIBODY SCREEN: CPT

## 2025-04-10 PROCEDURE — 83036 HEMOGLOBIN GLYCOSYLATED A1C: CPT

## 2025-04-10 PROCEDURE — 86900 BLOOD TYPING SEROLOGIC ABO: CPT

## 2025-04-10 PROCEDURE — 84443 ASSAY THYROID STIM HORMONE: CPT

## 2025-04-10 PROCEDURE — 84439 ASSAY OF FREE THYROXINE: CPT

## 2025-04-10 PROCEDURE — 85730 THROMBOPLASTIN TIME PARTIAL: CPT

## 2025-04-10 PROCEDURE — 85027 COMPLETE CBC AUTOMATED: CPT

## 2025-04-10 PROCEDURE — 85610 PROTHROMBIN TIME: CPT

## 2025-04-10 NOTE — PROGRESS NOTES
Patient unable to urinate for PAT appt. Spoke with TODD [place] CNP.  Instructed to come back 4/14 for a urine culture & UA prior to surgery 4/17. Pt verbalized understanding.

## 2025-04-11 ENCOUNTER — TELEPHONE (OUTPATIENT)
Dept: PRIMARY CARE CLINIC | Age: 65
End: 2025-04-11

## 2025-04-11 LAB
MICROORGANISM SPEC CULT: NORMAL
SPECIMEN DESCRIPTION: NORMAL

## 2025-04-11 NOTE — TELEPHONE ENCOUNTER
Medical director at [facility] called, states pt's fall was 4/2/25, pt has been evaluated by their clinicians, PT and is doing good. States he doesn't believe there is a need to have him evaluated at ER since he has been evaluated with no issues/complaints and fall a week ago

## 2025-04-14 DIAGNOSIS — G89.29 CHRONIC BILATERAL LOW BACK PAIN WITHOUT SCIATICA: ICD-10-CM

## 2025-04-14 DIAGNOSIS — M54.50 CHRONIC BILATERAL LOW BACK PAIN WITHOUT SCIATICA: ICD-10-CM

## 2025-04-14 LAB
BACTERIA URNS QL MICRO: ABNORMAL
BILIRUB UR QL STRIP: NEGATIVE
CLARITY UR: CLEAR
COLOR UR: YELLOW
GLUCOSE UR STRIP-MCNC: >=1000 MG/DL
HGB UR QL STRIP.AUTO: ABNORMAL
KETONES UR STRIP-MCNC: NEGATIVE MG/DL
LEUKOCYTE ESTERASE UR QL STRIP: ABNORMAL
NITRITE UR QL STRIP: NEGATIVE
PH UR STRIP: 5.5 [PH] (ref 5–8)
PROT UR STRIP-MCNC: NEGATIVE MG/DL
RBC #/AREA URNS HPF: ABNORMAL /HPF
SP GR UR STRIP: <1.005 (ref 1–1.03)
UROBILINOGEN UR STRIP-ACNC: 0.2 EU/DL (ref 0–1)
WBC #/AREA URNS HPF: ABNORMAL /HPF

## 2025-04-14 PROCEDURE — 81001 URINALYSIS AUTO W/SCOPE: CPT

## 2025-04-14 PROCEDURE — 87086 URINE CULTURE/COLONY COUNT: CPT

## 2025-04-14 RX ORDER — TRAMADOL HYDROCHLORIDE 50 MG/1
50 TABLET ORAL EVERY 8 HOURS PRN
Qty: 90 TABLET | Refills: 0 | OUTPATIENT
Start: 2025-04-14 | End: 2025-05-14

## 2025-04-14 NOTE — TELEPHONE ENCOUNTER
Name of Medication(s) Requested:  Requested Prescriptions      No prescriptions requested or ordered in this encounter       Medication is on current medication list Yes    Dosage and directions were verified? Yes    Quantity verified: 30 day supply     Pharmacy Verified?  Yes    Last Appointment:  9/30/2024    Future appts:  No future appointments.     (If no appt send self scheduling link. .REFILLAPPT)  Scheduling request sent?     [] Yes  [x] No    Does patient need updated?  [] Yes  [x] No

## 2025-04-15 LAB
MICROORGANISM SPEC CULT: ABNORMAL
MICROORGANISM SPEC CULT: ABNORMAL
SERVICE CMNT-IMP: ABNORMAL
SPECIMEN DESCRIPTION: ABNORMAL

## 2025-04-15 RX ORDER — TRAMADOL HYDROCHLORIDE 50 MG/1
50 TABLET ORAL EVERY 8 HOURS PRN
Qty: 90 TABLET | Refills: 0 | OUTPATIENT
Start: 2025-04-15 | End: 2025-05-15

## 2025-04-17 ENCOUNTER — APPOINTMENT (OUTPATIENT)
Dept: GENERAL RADIOLOGY | Age: 65
DRG: 235 | End: 2025-04-17
Attending: THORACIC SURGERY (CARDIOTHORACIC VASCULAR SURGERY)
Payer: COMMERCIAL

## 2025-04-17 ENCOUNTER — HOSPITAL ENCOUNTER (INPATIENT)
Age: 65
LOS: 7 days | Discharge: NURSING, REHAB OR LONG-TERM CARE | DRG: 235 | End: 2025-04-24
Attending: THORACIC SURGERY (CARDIOTHORACIC VASCULAR SURGERY) | Admitting: THORACIC SURGERY (CARDIOTHORACIC VASCULAR SURGERY)
Payer: COMMERCIAL

## 2025-04-17 ENCOUNTER — ANESTHESIA (OUTPATIENT)
Dept: OPERATING ROOM | Age: 65
End: 2025-04-17
Payer: COMMERCIAL

## 2025-04-17 DIAGNOSIS — Z01.812 PRE-OPERATIVE LABORATORY EXAMINATION: Primary | ICD-10-CM

## 2025-04-17 DIAGNOSIS — G89.18 ACUTE POST-OPERATIVE PAIN: ICD-10-CM

## 2025-04-17 PROBLEM — I95.81 POSTOPERATIVE HYPOTENSION: Status: ACTIVE | Noted: 2025-04-17

## 2025-04-17 PROBLEM — I25.10 CORONARY ARTERY DISEASE, UNSPECIFIED VESSEL OR LESION TYPE, UNSPECIFIED WHETHER ANGINA PRESENT, UNSPECIFIED WHETHER NATIVE OR TRANSPLANTED HEART: Status: ACTIVE | Noted: 2025-04-17

## 2025-04-17 PROBLEM — E11.65 TYPE 2 DIABETES MELLITUS WITH HYPERGLYCEMIA, WITHOUT LONG-TERM CURRENT USE OF INSULIN (HCC): Status: ACTIVE | Noted: 2025-04-17

## 2025-04-17 PROBLEM — J95.89 ACUTE POSTOPERATIVE RESPIRATORY INSUFFICIENCY: Status: ACTIVE | Noted: 2025-04-17

## 2025-04-17 PROBLEM — I35.0 NONRHEUMATIC AORTIC VALVE STENOSIS: Status: ACTIVE | Noted: 2025-04-17

## 2025-04-17 LAB
AADO2: 160.2 MMHG
AADO2: 191.2 MMHG
AADO2: 194 MMHG
ABO/RH: NORMAL
ACTIVATED CLOTTING TIME, HIGH RANGE: 108 SEC
ACTIVATED CLOTTING TIME, HIGH RANGE: 118 SEC
ACTIVATED CLOTTING TIME, HIGH RANGE: 418 SEC
ACTIVATED CLOTTING TIME, HIGH RANGE: 421 SEC
ACTIVATED CLOTTING TIME, HIGH RANGE: 474 SEC
ANION GAP SERPL CALCULATED.3IONS-SCNC: 12 MMOL/L (ref 7–16)
ANION GAP SERPL CALCULATED.3IONS-SCNC: 9 MMOL/L (ref 7–16)
ANTIBODY SCREEN: NEGATIVE
ARM BAND NUMBER: NORMAL
B.E.: -1.7 MMOL/L (ref -3–3)
B.E.: -1.8 MMOL/L (ref -3–3)
B.E.: -2.1 MMOL/L (ref -3–3)
B.E.: -5.3 MMOL/L (ref -3–3)
B.E.: -5.4 MMOL/L (ref -3–3)
BLOOD BANK DISPENSE STATUS: NORMAL
BLOOD BANK SAMPLE EXPIRATION: NORMAL
BNP SERPL-MCNC: 5111 PG/ML (ref 0–125)
BPU ID: NORMAL
BUN BLD-MCNC: 11 MG/DL (ref 6–23)
BUN BLD-MCNC: 12 MG/DL (ref 6–23)
BUN BLD-MCNC: 13 MG/DL (ref 6–23)
BUN SERPL-MCNC: 11 MG/DL (ref 8–23)
BUN SERPL-MCNC: 12 MG/DL (ref 8–23)
CA-I BLD-SCNC: 1.18 MMOL/L (ref 1.15–1.33)
CA-I BLD-SCNC: 1.21 MMOL/L (ref 1.15–1.33)
CA-I BLD-SCNC: 1.26 MMOL/L (ref 1.15–1.33)
CA-I BLD-SCNC: 1.47 MMOL/L (ref 1.15–1.33)
CALCIUM SERPL-MCNC: 8.7 MG/DL (ref 8.8–10.2)
CALCIUM SERPL-MCNC: 9.4 MG/DL (ref 8.8–10.2)
CHLORIDE BLD-SCNC: 101 MMOL/L (ref 100–108)
CHLORIDE BLD-SCNC: 101 MMOL/L (ref 100–108)
CHLORIDE BLD-SCNC: 108 MMOL/L (ref 100–108)
CHLORIDE SERPL-SCNC: 102 MMOL/L (ref 98–107)
CHLORIDE SERPL-SCNC: 106 MMOL/L (ref 98–107)
CO2 BLD CALC-SCNC: 18 MMOL/L (ref 22–29)
CO2 BLD CALC-SCNC: 18 MMOL/L (ref 22–29)
CO2 BLD CALC-SCNC: 22 MMOL/L (ref 22–29)
CO2 SERPL-SCNC: 20 MMOL/L (ref 22–29)
CO2 SERPL-SCNC: 21 MMOL/L (ref 22–29)
COHB: 0 % (ref 0–1.5)
COHB: 0 % (ref 0–1.5)
COHB: 0.1 % (ref 0–1.5)
COHB: 0.3 % (ref 0–1.5)
COHB: 0.3 % (ref 0–1.5)
COMPONENT: NORMAL
CREAT BLD-MCNC: 0.6 MG/DL (ref 0.7–1.2)
CREAT BLD-MCNC: 0.7 MG/DL (ref 0.7–1.2)
CREAT BLD-MCNC: 0.7 MG/DL (ref 0.7–1.2)
CREAT SERPL-MCNC: 0.6 MG/DL (ref 0.7–1.2)
CREAT SERPL-MCNC: 0.6 MG/DL (ref 0.7–1.2)
CRITICAL: ABNORMAL
CROSSMATCH RESULT: NORMAL
DATE ANALYZED: ABNORMAL
DATE OF COLLECTION: ABNORMAL
EGFR, POC: >90 ML/MIN/1.73M2
ERYTHROCYTE [DISTWIDTH] IN BLOOD BY AUTOMATED COUNT: 17 % (ref 11.5–15)
FIO2: 50 %
GFR, ESTIMATED: >90 ML/MIN/1.73M2
GFR, ESTIMATED: >90 ML/MIN/1.73M2
GLUCOSE BLD-MCNC: 116 MG/DL (ref 74–99)
GLUCOSE BLD-MCNC: 131 MG/DL (ref 74–99)
GLUCOSE BLD-MCNC: 144 MG/DL (ref 74–99)
GLUCOSE BLD-MCNC: 165 MG/DL (ref 74–99)
GLUCOSE BLD-MCNC: 168 MG/DL (ref 74–99)
GLUCOSE BLD-MCNC: 194 MG/DL (ref 74–99)
GLUCOSE BLD-MCNC: 220 MG/DL (ref 74–99)
GLUCOSE BLD-MCNC: 234 MG/DL (ref 74–99)
GLUCOSE SERPL-MCNC: 133 MG/DL (ref 74–99)
GLUCOSE SERPL-MCNC: 169 MG/DL (ref 74–99)
HCO3: 20.7 MMOL/L (ref 22–26)
HCO3: 21.8 MMOL/L (ref 22–26)
HCO3: 23.1 MMOL/L (ref 22–26)
HCO3: 23.9 MMOL/L (ref 22–26)
HCO3: 24.1 MMOL/L (ref 22–26)
HCT VFR BLD AUTO: 22 % (ref 37–54)
HCT VFR BLD AUTO: 23 % (ref 37–54)
HCT VFR BLD AUTO: 24 % (ref 37–54)
HCT VFR BLD AUTO: 26.1 % (ref 37–54)
HGB BLD-MCNC: 8.3 G/DL (ref 12.5–16.5)
HHB: 1.1 % (ref 0–5)
HHB: 2.2 % (ref 0–5)
HHB: 3.2 % (ref 0–5)
HHB: 6.5 % (ref 0–5)
HHB: 9.8 % (ref 0–5)
INR PPP: 1.4
LAB: ABNORMAL
Lab: 1054
Lab: 1150
Lab: 1244
Lab: 1355
Lab: 1500
MAGNESIUM SERPL-MCNC: 2.5 MG/DL (ref 1.6–2.4)
MCH RBC QN AUTO: 29 PG (ref 26–35)
MCHC RBC AUTO-ENTMCNC: 31.8 G/DL (ref 32–34.5)
MCV RBC AUTO: 91.3 FL (ref 80–99.9)
METHB: 0.1 % (ref 0–1.5)
METHB: 0.2 % (ref 0–1.5)
METHB: 0.3 % (ref 0–1.5)
MODE: ABNORMAL
MODE: AC
MODE: AC
NEGATIVE BASE EXCESS, ART: 3.3 MMOL/L
NEGATIVE BASE EXCESS, ART: 6.6 MMOL/L
NEGATIVE BASE EXCESS, ART: 7.4 MMOL/L
O2 SATURATION: 90.2 % (ref 92–98.5)
O2 SATURATION: 93.5 % (ref 92–98.5)
O2 SATURATION: 96.8 % (ref 92–98.5)
O2 SATURATION: 97.8 % (ref 92–98.5)
O2 SATURATION: 98.9 % (ref 92–98.5)
O2HB: 90 % (ref 94–97)
O2HB: 93.1 % (ref 94–97)
O2HB: 96.3 % (ref 94–97)
O2HB: 97.7 % (ref 94–97)
O2HB: 98.6 % (ref 94–97)
OPERATOR ID: ABNORMAL
PARTIAL THROMBOPLASTIN TIME: 29.3 SEC (ref 24.5–35.1)
PATIENT TEMP: 37 C
PCO2: 41.2 MMHG (ref 35–45)
PCO2: 42.7 MMHG (ref 35–45)
PCO2: 44.6 MMHG (ref 35–45)
PCO2: 46.2 MMHG (ref 35–45)
PCO2: 50.4 MMHG (ref 35–45)
PEEP/CPAP: 8 CMH2O
PFO2: 2.17 MMHG/%
PFO2: 2.29 MMHG/%
PFO2: 3 MMHG/%
PH BLOOD GAS: 7.25 (ref 7.35–7.45)
PH BLOOD GAS: 7.3 (ref 7.35–7.45)
PH BLOOD GAS: 7.33 (ref 7.35–7.45)
PH BLOOD GAS: 7.35 (ref 7.35–7.45)
PH BLOOD GAS: 7.37 (ref 7.35–7.45)
PLATELET # BLD AUTO: 233 K/UL (ref 130–450)
PMV BLD AUTO: 9.2 FL (ref 7–12)
PO2: 108.6 MMHG (ref 75–100)
PO2: 114.5 MMHG (ref 75–100)
PO2: 150 MMHG (ref 75–100)
PO2: 63.1 MMHG (ref 75–100)
PO2: 76.9 MMHG (ref 75–100)
POC ANION GAP: 12 MMOL/L (ref 7–16)
POC ANION GAP: 12 MMOL/L (ref 7–16)
POC ANION GAP: 15 MMOL/L (ref 7–16)
POC HCO3: 18.8 MMOL/L (ref 22–26)
POC HCO3: 19 MMOL/L (ref 22–26)
POC HCO3: 22.9 MMOL/L (ref 22–26)
POC HEMOGLOBIN (CALC): 7.6 G/DL (ref 12.5–15.5)
POC HEMOGLOBIN (CALC): 7.9 G/DL (ref 12.5–15.5)
POC HEMOGLOBIN (CALC): 8.2 G/DL (ref 12.5–15.5)
POC LACTIC ACID: 0.9 MMOL/L (ref 0.5–2.2)
POC LACTIC ACID: 2 MMOL/L (ref 0.5–2.2)
POC LACTIC ACID: 2.6 MMOL/L (ref 0.5–2.2)
POC O2 SATURATION: 100 % (ref 92–98.5)
POC O2 SATURATION: 98.8 % (ref 92–98.5)
POC O2 SATURATION: 99.9 % (ref 92–98.5)
POC PCO2: 37.4 MM HG (ref 35–45)
POC PCO2: 40.1 MM HG (ref 35–45)
POC PCO2: 45.8 MM HG (ref 35–45)
POC PH: 7.28 (ref 7.35–7.45)
POC PH: 7.31 (ref 7.35–7.45)
POC PH: 7.31 (ref 7.35–7.45)
POC PO2: 138.5 MM HG (ref 60–80)
POC PO2: 391.7 MM HG (ref 60–80)
POC PO2: 481.9 MM HG (ref 60–80)
POTASSIUM BLD-SCNC: 3.4 MMOL/L (ref 3.5–5)
POTASSIUM BLD-SCNC: 4.2 MMOL/L (ref 3.5–5)
POTASSIUM BLD-SCNC: 4.7 MMOL/L (ref 3.5–5)
POTASSIUM SERPL-SCNC: 4 MMOL/L (ref 3.5–5.1)
POTASSIUM SERPL-SCNC: 4.3 MMOL/L (ref 3.5–5.1)
POTASSIUM SERPL-SCNC: 4.7 MMOL/L (ref 3.5–5.1)
POTASSIUM SERPL-SCNC: 4.8 MMOL/L (ref 3.5–5.1)
PROTHROMBIN TIME: 15.8 SEC (ref 9.3–12.4)
PS: 8 CMH20
RBC # BLD AUTO: 2.86 M/UL (ref 3.8–5.8)
RI(T): 1.07
RI(T): 1.69
RI(T): 1.76
RR MECHANICAL: 12 B/MIN
RR MECHANICAL: 16 B/MIN
SODIUM BLD-SCNC: 134 MMOL/L (ref 132–146)
SODIUM BLD-SCNC: 135 MMOL/L (ref 132–146)
SODIUM BLD-SCNC: 138 MMOL/L (ref 132–146)
SODIUM SERPL-SCNC: 134 MMOL/L (ref 136–145)
SODIUM SERPL-SCNC: 136 MMOL/L (ref 136–145)
SOURCE, BLOOD GAS: ABNORMAL
THB: 9.1 G/DL (ref 11.5–16.5)
THB: 9.2 G/DL (ref 11.5–16.5)
THB: 9.3 G/DL (ref 11.5–16.5)
THB: 9.5 G/DL (ref 11.5–16.5)
THB: 9.5 G/DL (ref 11.5–16.5)
TIME ANALYZED: 1057
TIME ANALYZED: 1159
TIME ANALYZED: 1247
TIME ANALYZED: 1400
TIME ANALYZED: 1510
TRANSFUSION STATUS: NORMAL
UNIT DIVISION: 0
VT MECHANICAL: 450 ML
VT MECHANICAL: 450 ML
WBC OTHER # BLD: 26 K/UL (ref 4.5–11.5)

## 2025-04-17 PROCEDURE — 3600000018 HC SURGERY OHS ADDTL 15MIN: Performed by: THORACIC SURGERY (CARDIOTHORACIC VASCULAR SURGERY)

## 2025-04-17 PROCEDURE — 80047 BASIC METABLC PNL IONIZED CA: CPT

## 2025-04-17 PROCEDURE — P9045 ALBUMIN (HUMAN), 5%, 250 ML: HCPCS | Performed by: NURSE PRACTITIONER

## 2025-04-17 PROCEDURE — P9045 ALBUMIN (HUMAN), 5%, 250 ML: HCPCS

## 2025-04-17 PROCEDURE — B24BZZ4 ULTRASONOGRAPHY OF HEART WITH AORTA, TRANSESOPHAGEAL: ICD-10-PCS | Performed by: THORACIC SURGERY (CARDIOTHORACIC VASCULAR SURGERY)

## 2025-04-17 PROCEDURE — 99233 SBSQ HOSP IP/OBS HIGH 50: CPT | Performed by: INTERNAL MEDICINE

## 2025-04-17 PROCEDURE — P9045 ALBUMIN (HUMAN), 5%, 250 ML: HCPCS | Performed by: PHYSICIAN ASSISTANT

## 2025-04-17 PROCEDURE — 6370000000 HC RX 637 (ALT 250 FOR IP): Performed by: PHYSICIAN ASSISTANT

## 2025-04-17 PROCEDURE — 6370000000 HC RX 637 (ALT 250 FOR IP)

## 2025-04-17 PROCEDURE — 3700000001 HC ADD 15 MINUTES (ANESTHESIA): Performed by: THORACIC SURGERY (CARDIOTHORACIC VASCULAR SURGERY)

## 2025-04-17 PROCEDURE — 06BQ4ZZ EXCISION OF LEFT SAPHENOUS VEIN, PERCUTANEOUS ENDOSCOPIC APPROACH: ICD-10-PCS | Performed by: THORACIC SURGERY (CARDIOTHORACIC VASCULAR SURGERY)

## 2025-04-17 PROCEDURE — 2500000003 HC RX 250 WO HCPCS: Performed by: THORACIC SURGERY (CARDIOTHORACIC VASCULAR SURGERY)

## 2025-04-17 PROCEDURE — 3600000008 HC SURGERY OHS BASE: Performed by: THORACIC SURGERY (CARDIOTHORACIC VASCULAR SURGERY)

## 2025-04-17 PROCEDURE — 94640 AIRWAY INHALATION TREATMENT: CPT

## 2025-04-17 PROCEDURE — C1769 GUIDE WIRE: HCPCS | Performed by: THORACIC SURGERY (CARDIOTHORACIC VASCULAR SURGERY)

## 2025-04-17 PROCEDURE — A4648 IMPLANTABLE TISSUE MARKER: HCPCS | Performed by: THORACIC SURGERY (CARDIOTHORACIC VASCULAR SURGERY)

## 2025-04-17 PROCEDURE — 99024 POSTOP FOLLOW-UP VISIT: CPT | Performed by: PHYSICIAN ASSISTANT

## 2025-04-17 PROCEDURE — 33517 CABG ARTERY-VEIN SINGLE: CPT | Performed by: THORACIC SURGERY (CARDIOTHORACIC VASCULAR SURGERY)

## 2025-04-17 PROCEDURE — C1889 IMPLANT/INSERT DEVICE, NOC: HCPCS | Performed by: THORACIC SURGERY (CARDIOTHORACIC VASCULAR SURGERY)

## 2025-04-17 PROCEDURE — APPSS30 APP SPLIT SHARED TIME 16-30 MINUTES: Performed by: CLINICAL NURSE SPECIALIST

## 2025-04-17 PROCEDURE — 36556 INSERT NON-TUNNEL CV CATH: CPT

## 2025-04-17 PROCEDURE — 6360000002 HC RX W HCPCS

## 2025-04-17 PROCEDURE — 82330 ASSAY OF CALCIUM: CPT

## 2025-04-17 PROCEDURE — 02L70CK OCCLUSION OF LEFT ATRIAL APPENDAGE WITH EXTRALUMINAL DEVICE, OPEN APPROACH: ICD-10-PCS | Performed by: THORACIC SURGERY (CARDIOTHORACIC VASCULAR SURGERY)

## 2025-04-17 PROCEDURE — C1729 CATH, DRAINAGE: HCPCS | Performed by: THORACIC SURGERY (CARDIOTHORACIC VASCULAR SURGERY)

## 2025-04-17 PROCEDURE — 73630 X-RAY EXAM OF FOOT: CPT

## 2025-04-17 PROCEDURE — 82805 BLOOD GASES W/O2 SATURATION: CPT

## 2025-04-17 PROCEDURE — 85730 THROMBOPLASTIN TIME PARTIAL: CPT

## 2025-04-17 PROCEDURE — 0WJD0ZZ INSPECTION OF PERICARDIAL CAVITY, OPEN APPROACH: ICD-10-PCS | Performed by: THORACIC SURGERY (CARDIOTHORACIC VASCULAR SURGERY)

## 2025-04-17 PROCEDURE — 37799 UNLISTED PX VASCULAR SURGERY: CPT

## 2025-04-17 PROCEDURE — 02100Z9 BYPASS CORONARY ARTERY, ONE ARTERY FROM LEFT INTERNAL MAMMARY, OPEN APPROACH: ICD-10-PCS | Performed by: THORACIC SURGERY (CARDIOTHORACIC VASCULAR SURGERY)

## 2025-04-17 PROCEDURE — 2709999900 HC NON-CHARGEABLE SUPPLY: Performed by: THORACIC SURGERY (CARDIOTHORACIC VASCULAR SURGERY)

## 2025-04-17 PROCEDURE — 2580000003 HC RX 258

## 2025-04-17 PROCEDURE — 7100000001 HC PACU RECOVERY - ADDTL 15 MIN

## 2025-04-17 PROCEDURE — 85347 COAGULATION TIME ACTIVATED: CPT

## 2025-04-17 PROCEDURE — 94002 VENT MGMT INPAT INIT DAY: CPT

## 2025-04-17 PROCEDURE — 82803 BLOOD GASES ANY COMBINATION: CPT

## 2025-04-17 PROCEDURE — 5A1221Z PERFORMANCE OF CARDIAC OUTPUT, CONTINUOUS: ICD-10-PCS | Performed by: THORACIC SURGERY (CARDIOTHORACIC VASCULAR SURGERY)

## 2025-04-17 PROCEDURE — 7100000000 HC PACU RECOVERY - FIRST 15 MIN

## 2025-04-17 PROCEDURE — 83735 ASSAY OF MAGNESIUM: CPT

## 2025-04-17 PROCEDURE — 2000000000 HC ICU R&B

## 2025-04-17 PROCEDURE — 83605 ASSAY OF LACTIC ACID: CPT

## 2025-04-17 PROCEDURE — 2580000003 HC RX 258: Performed by: PHYSICIAN ASSISTANT

## 2025-04-17 PROCEDURE — 6360000002 HC RX W HCPCS: Performed by: PHYSICIAN ASSISTANT

## 2025-04-17 PROCEDURE — C1713 ANCHOR/SCREW BN/BN,TIS/BN: HCPCS | Performed by: THORACIC SURGERY (CARDIOTHORACIC VASCULAR SURGERY)

## 2025-04-17 PROCEDURE — 94664 DEMO&/EVAL PT USE INHALER: CPT

## 2025-04-17 PROCEDURE — 2500000003 HC RX 250 WO HCPCS

## 2025-04-17 PROCEDURE — 83880 ASSAY OF NATRIURETIC PEPTIDE: CPT

## 2025-04-17 PROCEDURE — 6370000000 HC RX 637 (ALT 250 FOR IP): Performed by: ANESTHESIOLOGY

## 2025-04-17 PROCEDURE — 6360000002 HC RX W HCPCS: Performed by: THORACIC SURGERY (CARDIOTHORACIC VASCULAR SURGERY)

## 2025-04-17 PROCEDURE — 2500000003 HC RX 250 WO HCPCS: Performed by: PHYSICIAN ASSISTANT

## 2025-04-17 PROCEDURE — 82962 GLUCOSE BLOOD TEST: CPT

## 2025-04-17 PROCEDURE — 94660 CPAP INITIATION&MGMT: CPT

## 2025-04-17 PROCEDURE — 3700000000 HC ANESTHESIA ATTENDED CARE: Performed by: THORACIC SURGERY (CARDIOTHORACIC VASCULAR SURGERY)

## 2025-04-17 PROCEDURE — 85610 PROTHROMBIN TIME: CPT

## 2025-04-17 PROCEDURE — 71045 X-RAY EXAM CHEST 1 VIEW: CPT

## 2025-04-17 PROCEDURE — 2500000003 HC RX 250 WO HCPCS: Performed by: NURSE PRACTITIONER

## 2025-04-17 PROCEDURE — 85027 COMPLETE CBC AUTOMATED: CPT

## 2025-04-17 PROCEDURE — 33268 EXCL LAA OPN OTH PX ANY METH: CPT | Performed by: THORACIC SURGERY (CARDIOTHORACIC VASCULAR SURGERY)

## 2025-04-17 PROCEDURE — 85014 HEMATOCRIT: CPT

## 2025-04-17 PROCEDURE — 021009W BYPASS CORONARY ARTERY, ONE ARTERY FROM AORTA WITH AUTOLOGOUS VENOUS TISSUE, OPEN APPROACH: ICD-10-PCS | Performed by: THORACIC SURGERY (CARDIOTHORACIC VASCULAR SURGERY)

## 2025-04-17 PROCEDURE — 2700000000 HC OXYGEN THERAPY PER DAY

## 2025-04-17 PROCEDURE — 80048 BASIC METABOLIC PNL TOTAL CA: CPT

## 2025-04-17 PROCEDURE — 6360000002 HC RX W HCPCS: Performed by: NURSE PRACTITIONER

## 2025-04-17 PROCEDURE — 2720000010 HC SURG SUPPLY STERILE: Performed by: THORACIC SURGERY (CARDIOTHORACIC VASCULAR SURGERY)

## 2025-04-17 PROCEDURE — 33508 ENDOSCOPIC VEIN HARVEST: CPT | Performed by: THORACIC SURGERY (CARDIOTHORACIC VASCULAR SURGERY)

## 2025-04-17 PROCEDURE — 84132 ASSAY OF SERUM POTASSIUM: CPT

## 2025-04-17 PROCEDURE — 2580000003 HC RX 258: Performed by: THORACIC SURGERY (CARDIOTHORACIC VASCULAR SURGERY)

## 2025-04-17 PROCEDURE — 33533 CABG ARTERIAL SINGLE: CPT | Performed by: THORACIC SURGERY (CARDIOTHORACIC VASCULAR SURGERY)

## 2025-04-17 DEVICE — LOCKING SCREW,AXS,SELF-DRILLING
Type: IMPLANTABLE DEVICE | Site: STERNUM | Status: FUNCTIONAL
Brand: AXS, SMARTLOCK

## 2025-04-17 DEVICE — STERNALPLATE, LADDER, NARROW: Type: IMPLANTABLE DEVICE | Site: STERNUM | Status: FUNCTIONAL

## 2025-04-17 DEVICE — STERNALPLATE, HEX, NARROW: Type: IMPLANTABLE DEVICE | Site: STERNUM | Status: FUNCTIONAL

## 2025-04-17 DEVICE — LAA EXCLUSION SYSTEM, ACHM40
Type: IMPLANTABLE DEVICE | Site: HEART | Status: FUNCTIONAL
Brand: ATRICLIP® FLEX-MINI™ LAA EXCLUSION SYSTEM

## 2025-04-17 RX ORDER — LIDOCAINE HYDROCHLORIDE 20 MG/ML
INJECTION, SOLUTION INTRAVENOUS
Status: DISCONTINUED | OUTPATIENT
Start: 2025-04-17 | End: 2025-04-17 | Stop reason: SDUPTHER

## 2025-04-17 RX ORDER — SODIUM CHLORIDE 9 MG/ML
INJECTION, SOLUTION INTRAVENOUS CONTINUOUS
Status: DISCONTINUED | OUTPATIENT
Start: 2025-04-17 | End: 2025-04-17 | Stop reason: HOSPADM

## 2025-04-17 RX ORDER — ALBUMIN HUMAN 50 G/1000ML
12.5 SOLUTION INTRAVENOUS ONCE
Status: COMPLETED | OUTPATIENT
Start: 2025-04-17 | End: 2025-04-17

## 2025-04-17 RX ORDER — ACETAMINOPHEN 500 MG
1000 TABLET ORAL ONCE
Status: COMPLETED | OUTPATIENT
Start: 2025-04-17 | End: 2025-04-17

## 2025-04-17 RX ORDER — DEXTROSE MONOHYDRATE 100 MG/ML
INJECTION, SOLUTION INTRAVENOUS CONTINUOUS PRN
Status: DISCONTINUED | OUTPATIENT
Start: 2025-04-17 | End: 2025-04-18

## 2025-04-17 RX ORDER — SODIUM CHLORIDE 0.9 % (FLUSH) 0.9 %
5-40 SYRINGE (ML) INJECTION PRN
Status: DISCONTINUED | OUTPATIENT
Start: 2025-04-17 | End: 2025-04-24 | Stop reason: HOSPADM

## 2025-04-17 RX ORDER — ASPIRIN 81 MG/1
81 TABLET ORAL DAILY
Status: DISCONTINUED | OUTPATIENT
Start: 2025-04-17 | End: 2025-04-18

## 2025-04-17 RX ORDER — ACETAMINOPHEN 325 MG/1
650 TABLET ORAL EVERY 4 HOURS PRN
Status: DISCONTINUED | OUTPATIENT
Start: 2025-04-20 | End: 2025-04-18

## 2025-04-17 RX ORDER — POLYETHYLENE GLYCOL 3350 17 G/17G
17 POWDER, FOR SOLUTION ORAL 2 TIMES DAILY
Status: DISCONTINUED | OUTPATIENT
Start: 2025-04-18 | End: 2025-04-18

## 2025-04-17 RX ORDER — VECURONIUM BROMIDE 1 MG/ML
INJECTION, POWDER, LYOPHILIZED, FOR SOLUTION INTRAVENOUS
Status: DISCONTINUED | OUTPATIENT
Start: 2025-04-17 | End: 2025-04-17 | Stop reason: SDUPTHER

## 2025-04-17 RX ORDER — INDOMETHACIN 25 MG/1
50 CAPSULE ORAL ONCE
Status: COMPLETED | OUTPATIENT
Start: 2025-04-17 | End: 2025-04-17

## 2025-04-17 RX ORDER — NOREPINEPHRINE BITARTRATE 0.06 MG/ML
1-100 INJECTION, SOLUTION INTRAVENOUS CONTINUOUS PRN
Status: DISCONTINUED | OUTPATIENT
Start: 2025-04-17 | End: 2025-04-18

## 2025-04-17 RX ORDER — OXYCODONE HYDROCHLORIDE 5 MG/1
5 TABLET ORAL EVERY 4 HOURS PRN
Status: DISCONTINUED | OUTPATIENT
Start: 2025-04-22 | End: 2025-04-24 | Stop reason: HOSPADM

## 2025-04-17 RX ORDER — MEPERIDINE HYDROCHLORIDE 25 MG/ML
25 INJECTION INTRAMUSCULAR; INTRAVENOUS; SUBCUTANEOUS
Status: DISCONTINUED | OUTPATIENT
Start: 2025-04-17 | End: 2025-04-18

## 2025-04-17 RX ORDER — HEPARIN SODIUM 10000 [USP'U]/ML
INJECTION, SOLUTION INTRAVENOUS; SUBCUTANEOUS
Status: DISCONTINUED | OUTPATIENT
Start: 2025-04-17 | End: 2025-04-17 | Stop reason: SDUPTHER

## 2025-04-17 RX ORDER — MUPIROCIN 20 MG/G
OINTMENT TOPICAL 2 TIMES DAILY
Status: DISPENSED | OUTPATIENT
Start: 2025-04-17 | End: 2025-04-22

## 2025-04-17 RX ORDER — METHADONE HYDROCHLORIDE 10 MG/5ML
SOLUTION ORAL
Status: DISCONTINUED | OUTPATIENT
Start: 2025-04-17 | End: 2025-04-17 | Stop reason: SDUPTHER

## 2025-04-17 RX ORDER — OXYCODONE HCL 10 MG/1
10 TABLET, FILM COATED, EXTENDED RELEASE ORAL ONCE
Status: COMPLETED | OUTPATIENT
Start: 2025-04-17 | End: 2025-04-17

## 2025-04-17 RX ORDER — SODIUM CHLORIDE 9 MG/ML
INJECTION, SOLUTION INTRAVENOUS
Status: DISCONTINUED | OUTPATIENT
Start: 2025-04-17 | End: 2025-04-17 | Stop reason: SDUPTHER

## 2025-04-17 RX ORDER — MAGNESIUM SULFATE IN WATER 40 MG/ML
2000 INJECTION, SOLUTION INTRAVENOUS PRN
Status: DISCONTINUED | OUTPATIENT
Start: 2025-04-17 | End: 2025-04-18

## 2025-04-17 RX ORDER — SODIUM CHLORIDE 9 MG/ML
INJECTION, SOLUTION INTRAVENOUS PRN
Status: DISCONTINUED | OUTPATIENT
Start: 2025-04-17 | End: 2025-04-17 | Stop reason: HOSPADM

## 2025-04-17 RX ORDER — BISACODYL 10 MG
10 SUPPOSITORY, RECTAL RECTAL DAILY PRN
Status: DISCONTINUED | OUTPATIENT
Start: 2025-04-18 | End: 2025-04-18

## 2025-04-17 RX ORDER — ATORVASTATIN CALCIUM 40 MG/1
40 TABLET, FILM COATED ORAL NIGHTLY
Status: DISCONTINUED | OUTPATIENT
Start: 2025-04-18 | End: 2025-04-24 | Stop reason: HOSPADM

## 2025-04-17 RX ORDER — CALCIUM CHLORIDE 100 MG/ML
INJECTION INTRAVENOUS; INTRAVENTRICULAR
Status: DISCONTINUED | OUTPATIENT
Start: 2025-04-17 | End: 2025-04-17 | Stop reason: SDUPTHER

## 2025-04-17 RX ORDER — INDOMETHACIN 25 MG/1
CAPSULE ORAL
Status: DISCONTINUED | OUTPATIENT
Start: 2025-04-17 | End: 2025-04-17 | Stop reason: SDUPTHER

## 2025-04-17 RX ORDER — ONDANSETRON 4 MG/1
4 TABLET, ORALLY DISINTEGRATING ORAL EVERY 8 HOURS PRN
Status: DISCONTINUED | OUTPATIENT
Start: 2025-04-17 | End: 2025-04-24 | Stop reason: HOSPADM

## 2025-04-17 RX ORDER — PHENYLEPHRINE HCL IN 0.9% NACL 1 MG/10 ML
SYRINGE (ML) INTRAVENOUS
Status: DISCONTINUED | OUTPATIENT
Start: 2025-04-17 | End: 2025-04-17 | Stop reason: SDUPTHER

## 2025-04-17 RX ORDER — SODIUM CHLORIDE 9 MG/ML
INJECTION, SOLUTION INTRAVENOUS PRN
Status: DISCONTINUED | OUTPATIENT
Start: 2025-04-17 | End: 2025-04-18

## 2025-04-17 RX ORDER — ALBUMIN HUMAN 50 G/1000ML
SOLUTION INTRAVENOUS
Status: DISCONTINUED | OUTPATIENT
Start: 2025-04-17 | End: 2025-04-17 | Stop reason: SDUPTHER

## 2025-04-17 RX ORDER — CHLORHEXIDINE GLUCONATE ORAL RINSE 1.2 MG/ML
15 SOLUTION DENTAL 2 TIMES DAILY
Status: DISCONTINUED | OUTPATIENT
Start: 2025-04-17 | End: 2025-04-18

## 2025-04-17 RX ORDER — BISACODYL 5 MG/1
5 TABLET, DELAYED RELEASE ORAL DAILY
Status: DISCONTINUED | OUTPATIENT
Start: 2025-04-18 | End: 2025-04-18

## 2025-04-17 RX ORDER — PROPOFOL 10 MG/ML
10 INJECTION, EMULSION INTRAVENOUS CONTINUOUS PRN
Status: DISCONTINUED | OUTPATIENT
Start: 2025-04-17 | End: 2025-04-18

## 2025-04-17 RX ORDER — CELECOXIB 100 MG/1
200 CAPSULE ORAL ONCE
Status: COMPLETED | OUTPATIENT
Start: 2025-04-17 | End: 2025-04-17

## 2025-04-17 RX ORDER — ONDANSETRON 2 MG/ML
4 INJECTION INTRAMUSCULAR; INTRAVENOUS EVERY 6 HOURS PRN
Status: DISCONTINUED | OUTPATIENT
Start: 2025-04-17 | End: 2025-04-24 | Stop reason: HOSPADM

## 2025-04-17 RX ORDER — PANTOPRAZOLE SODIUM 40 MG/1
40 TABLET, DELAYED RELEASE ORAL
Status: DISCONTINUED | OUTPATIENT
Start: 2025-04-18 | End: 2025-04-24 | Stop reason: HOSPADM

## 2025-04-17 RX ORDER — SODIUM CHLORIDE, SODIUM LACTATE, POTASSIUM CHLORIDE, CALCIUM CHLORIDE 600; 310; 30; 20 MG/100ML; MG/100ML; MG/100ML; MG/100ML
INJECTION, SOLUTION INTRAVENOUS
Status: DISCONTINUED | OUTPATIENT
Start: 2025-04-17 | End: 2025-04-17 | Stop reason: SDUPTHER

## 2025-04-17 RX ORDER — KETOROLAC TROMETHAMINE 30 MG/ML
15 INJECTION, SOLUTION INTRAMUSCULAR; INTRAVENOUS EVERY 6 HOURS
Status: COMPLETED | OUTPATIENT
Start: 2025-04-17 | End: 2025-04-18

## 2025-04-17 RX ORDER — ASPIRIN 81 MG/1
81 TABLET, CHEWABLE ORAL ONCE
Status: COMPLETED | OUTPATIENT
Start: 2025-04-17 | End: 2025-04-17

## 2025-04-17 RX ORDER — POTASSIUM CHLORIDE 29.8 MG/ML
INJECTION INTRAVENOUS
Status: DISCONTINUED | OUTPATIENT
Start: 2025-04-17 | End: 2025-04-17 | Stop reason: SDUPTHER

## 2025-04-17 RX ORDER — SENNA AND DOCUSATE SODIUM 50; 8.6 MG/1; MG/1
1 TABLET, FILM COATED ORAL 2 TIMES DAILY
Status: DISCONTINUED | OUTPATIENT
Start: 2025-04-17 | End: 2025-04-18

## 2025-04-17 RX ORDER — ACETAMINOPHEN 650 MG/1
650 SUPPOSITORY RECTAL EVERY 4 HOURS PRN
Status: DISCONTINUED | OUTPATIENT
Start: 2025-04-17 | End: 2025-04-18

## 2025-04-17 RX ORDER — PROPOFOL 10 MG/ML
INJECTION, EMULSION INTRAVENOUS
Status: DISCONTINUED | OUTPATIENT
Start: 2025-04-17 | End: 2025-04-17 | Stop reason: SDUPTHER

## 2025-04-17 RX ORDER — AMIODARONE HYDROCHLORIDE 200 MG/1
400 TABLET ORAL PRN
Status: DISCONTINUED | OUTPATIENT
Start: 2025-04-17 | End: 2025-04-18

## 2025-04-17 RX ORDER — TAMSULOSIN HYDROCHLORIDE 0.4 MG/1
0.4 CAPSULE ORAL DAILY
Status: DISCONTINUED | OUTPATIENT
Start: 2025-04-18 | End: 2025-04-18

## 2025-04-17 RX ORDER — OXYCODONE HYDROCHLORIDE 10 MG/1
10 TABLET ORAL EVERY 4 HOURS PRN
Status: DISPENSED | OUTPATIENT
Start: 2025-04-17 | End: 2025-04-22

## 2025-04-17 RX ORDER — INSULIN GLARGINE 100 [IU]/ML
0.15 INJECTION, SOLUTION SUBCUTANEOUS NIGHTLY
Status: DISCONTINUED | OUTPATIENT
Start: 2025-04-18 | End: 2025-04-19

## 2025-04-17 RX ORDER — PROPOFOL 10 MG/ML
INJECTION, EMULSION INTRAVENOUS
Status: COMPLETED
Start: 2025-04-17 | End: 2025-04-17

## 2025-04-17 RX ORDER — DEXAMETHASONE SODIUM PHOSPHATE 10 MG/ML
INJECTION, SOLUTION INTRA-ARTICULAR; INTRALESIONAL; INTRAMUSCULAR; INTRAVENOUS; SOFT TISSUE PRN
Status: DISCONTINUED | OUTPATIENT
Start: 2025-04-17 | End: 2025-04-17 | Stop reason: ALTCHOICE

## 2025-04-17 RX ORDER — DEXAMETHASONE SODIUM PHOSPHATE 10 MG/ML
INJECTION, SOLUTION INTRA-ARTICULAR; INTRALESIONAL; INTRAMUSCULAR; INTRAVENOUS; SOFT TISSUE
Status: DISCONTINUED | OUTPATIENT
Start: 2025-04-17 | End: 2025-04-17 | Stop reason: SDUPTHER

## 2025-04-17 RX ORDER — 0.9 % SODIUM CHLORIDE 0.9 %
250 INTRAVENOUS SOLUTION INTRAVENOUS CONTINUOUS PRN
Status: DISCONTINUED | OUTPATIENT
Start: 2025-04-17 | End: 2025-04-18

## 2025-04-17 RX ORDER — LACTULOSE 10 G/15ML
20 SOLUTION ORAL DAILY
Status: DISCONTINUED | OUTPATIENT
Start: 2025-04-19 | End: 2025-04-24 | Stop reason: HOSPADM

## 2025-04-17 RX ORDER — ROPIVACAINE HYDROCHLORIDE 5 MG/ML
INJECTION, SOLUTION EPIDURAL; INFILTRATION; PERINEURAL PRN
Status: DISCONTINUED | OUTPATIENT
Start: 2025-04-17 | End: 2025-04-17 | Stop reason: ALTCHOICE

## 2025-04-17 RX ORDER — SODIUM CHLORIDE 0.9 % (FLUSH) 0.9 %
5-40 SYRINGE (ML) INJECTION EVERY 12 HOURS SCHEDULED
Status: DISCONTINUED | OUTPATIENT
Start: 2025-04-17 | End: 2025-04-24 | Stop reason: HOSPADM

## 2025-04-17 RX ORDER — CALCIUM CARBONATE 500 MG/1
500 TABLET, CHEWABLE ORAL 3 TIMES DAILY PRN
Status: DISCONTINUED | OUTPATIENT
Start: 2025-04-18 | End: 2025-04-24 | Stop reason: HOSPADM

## 2025-04-17 RX ORDER — POTASSIUM CHLORIDE 29.8 MG/ML
20 INJECTION INTRAVENOUS PRN
Status: DISCONTINUED | OUTPATIENT
Start: 2025-04-17 | End: 2025-04-18

## 2025-04-17 RX ORDER — GLUCAGON 1 MG/ML
1 KIT INJECTION PRN
Status: DISCONTINUED | OUTPATIENT
Start: 2025-04-17 | End: 2025-04-18

## 2025-04-17 RX ORDER — ACETAMINOPHEN 500 MG
1000 TABLET ORAL EVERY 6 HOURS SCHEDULED
Status: DISPENSED | OUTPATIENT
Start: 2025-04-17 | End: 2025-04-19

## 2025-04-17 RX ORDER — SODIUM CHLORIDE 0.9 % (FLUSH) 0.9 %
5-40 SYRINGE (ML) INJECTION PRN
Status: DISCONTINUED | OUTPATIENT
Start: 2025-04-17 | End: 2025-04-17 | Stop reason: HOSPADM

## 2025-04-17 RX ORDER — LIDOCAINE 4 G/G
1 PATCH TOPICAL DAILY
Status: DISCONTINUED | OUTPATIENT
Start: 2025-04-17 | End: 2025-04-24 | Stop reason: HOSPADM

## 2025-04-17 RX ORDER — INSULIN LISPRO 100 [IU]/ML
0-4 INJECTION, SOLUTION INTRAVENOUS; SUBCUTANEOUS EVERY 4 HOURS
Status: DISCONTINUED | OUTPATIENT
Start: 2025-04-17 | End: 2025-04-18 | Stop reason: SDUPTHER

## 2025-04-17 RX ORDER — MIDAZOLAM HYDROCHLORIDE 1 MG/ML
INJECTION, SOLUTION INTRAMUSCULAR; INTRAVENOUS
Status: DISCONTINUED | OUTPATIENT
Start: 2025-04-17 | End: 2025-04-17 | Stop reason: SDUPTHER

## 2025-04-17 RX ORDER — SODIUM CHLORIDE 9 MG/ML
INJECTION, SOLUTION INTRAVENOUS CONTINUOUS
Status: DISCONTINUED | OUTPATIENT
Start: 2025-04-17 | End: 2025-04-18

## 2025-04-17 RX ORDER — BISACODYL 10 MG
10 SUPPOSITORY, RECTAL RECTAL DAILY
Status: DISCONTINUED | OUTPATIENT
Start: 2025-04-20 | End: 2025-04-24 | Stop reason: HOSPADM

## 2025-04-17 RX ORDER — VASOPRESSIN 20 [USP'U]/ML
INJECTION, SOLUTION INTRAVENOUS
Status: DISCONTINUED | OUTPATIENT
Start: 2025-04-17 | End: 2025-04-17 | Stop reason: SDUPTHER

## 2025-04-17 RX ORDER — OXYCODONE HYDROCHLORIDE 5 MG/1
5 TABLET ORAL EVERY 4 HOURS PRN
Status: DISPENSED | OUTPATIENT
Start: 2025-04-17 | End: 2025-04-22

## 2025-04-17 RX ORDER — AMINOCAPROIC ACID 250 MG/ML
INJECTION, SOLUTION INTRAVENOUS
Status: DISCONTINUED | OUTPATIENT
Start: 2025-04-17 | End: 2025-04-17 | Stop reason: SDUPTHER

## 2025-04-17 RX ORDER — IBUPROFEN 400 MG/1
400 TABLET, FILM COATED ORAL EVERY 8 HOURS
Status: DISPENSED | OUTPATIENT
Start: 2025-04-19 | End: 2025-04-20

## 2025-04-17 RX ORDER — PROTAMINE SULFATE 10 MG/ML
INJECTION, SOLUTION INTRAVENOUS
Status: DISCONTINUED | OUTPATIENT
Start: 2025-04-17 | End: 2025-04-17 | Stop reason: SDUPTHER

## 2025-04-17 RX ORDER — CLOPIDOGREL BISULFATE 75 MG/1
75 TABLET ORAL DAILY
Status: DISCONTINUED | OUTPATIENT
Start: 2025-04-18 | End: 2025-04-24 | Stop reason: HOSPADM

## 2025-04-17 RX ORDER — ALBUMIN HUMAN 50 G/1000ML
SOLUTION INTRAVENOUS
Status: COMPLETED
Start: 2025-04-17 | End: 2025-04-17

## 2025-04-17 RX ORDER — IBUPROFEN 400 MG/1
400 TABLET, FILM COATED ORAL EVERY 6 HOURS PRN
Status: DISCONTINUED | OUTPATIENT
Start: 2025-04-20 | End: 2025-04-24 | Stop reason: HOSPADM

## 2025-04-17 RX ORDER — AMIODARONE HYDROCHLORIDE 150 MG/3ML
INJECTION, SOLUTION INTRAVENOUS
Status: DISCONTINUED | OUTPATIENT
Start: 2025-04-17 | End: 2025-04-17 | Stop reason: SDUPTHER

## 2025-04-17 RX ORDER — IPRATROPIUM BROMIDE AND ALBUTEROL SULFATE 2.5; .5 MG/3ML; MG/3ML
1 SOLUTION RESPIRATORY (INHALATION)
Status: DISCONTINUED | OUTPATIENT
Start: 2025-04-17 | End: 2025-04-24 | Stop reason: HOSPADM

## 2025-04-17 RX ORDER — SODIUM CHLORIDE 0.9 % (FLUSH) 0.9 %
5-40 SYRINGE (ML) INJECTION EVERY 12 HOURS SCHEDULED
Status: DISCONTINUED | OUTPATIENT
Start: 2025-04-17 | End: 2025-04-17 | Stop reason: HOSPADM

## 2025-04-17 RX ORDER — LANOLIN ALCOHOL/MO/W.PET/CERES
400 CREAM (GRAM) TOPICAL 2 TIMES DAILY
Status: DISCONTINUED | OUTPATIENT
Start: 2025-04-18 | End: 2025-04-24 | Stop reason: HOSPADM

## 2025-04-17 RX ORDER — ALBUMIN HUMAN 50 G/1000ML
25 SOLUTION INTRAVENOUS PRN
Status: DISCONTINUED | OUTPATIENT
Start: 2025-04-17 | End: 2025-04-18

## 2025-04-17 RX ORDER — NITROGLYCERIN 5 MG/ML
INJECTION, SOLUTION INTRAVENOUS
Status: DISCONTINUED | OUTPATIENT
Start: 2025-04-17 | End: 2025-04-17 | Stop reason: SDUPTHER

## 2025-04-17 RX ADMIN — Medication 50 MCG: at 07:56

## 2025-04-17 RX ADMIN — SODIUM BICARBONATE 50 MEQ: 84 INJECTION INTRAVENOUS at 08:50

## 2025-04-17 RX ADMIN — PHENYLEPHRINE HYDROCHLORIDE 100 MCG: 10 INJECTION INTRAVENOUS at 07:14

## 2025-04-17 RX ADMIN — SODIUM BICARBONATE 50 MEQ: 84 INJECTION INTRAVENOUS at 10:05

## 2025-04-17 RX ADMIN — CEFAZOLIN 2000 MG: 2 INJECTION, POWDER, FOR SOLUTION INTRAMUSCULAR; INTRAVENOUS at 07:06

## 2025-04-17 RX ADMIN — Medication 4 MCG/MIN: at 11:04

## 2025-04-17 RX ADMIN — IPRATROPIUM BROMIDE AND ALBUTEROL SULFATE 1 DOSE: 2.5; .5 SOLUTION RESPIRATORY (INHALATION) at 18:01

## 2025-04-17 RX ADMIN — Medication 50 MCG: at 07:57

## 2025-04-17 RX ADMIN — ALBUMIN (HUMAN) 25 G: 12.5 INJECTION, SOLUTION INTRAVENOUS at 10:11

## 2025-04-17 RX ADMIN — MUPIROCIN: 20 OINTMENT TOPICAL at 11:56

## 2025-04-17 RX ADMIN — SODIUM CHLORIDE, POTASSIUM CHLORIDE, SODIUM LACTATE AND CALCIUM CHLORIDE: 600; 310; 30; 20 INJECTION, SOLUTION INTRAVENOUS at 06:54

## 2025-04-17 RX ADMIN — KETOROLAC TROMETHAMINE 15 MG: 30 INJECTION, SOLUTION INTRAMUSCULAR at 11:59

## 2025-04-17 RX ADMIN — PROPOFOL 10 MCG/KG/MIN: 10 INJECTION, EMULSION INTRAVENOUS at 11:15

## 2025-04-17 RX ADMIN — Medication 50 MCG: at 07:53

## 2025-04-17 RX ADMIN — NITROGLYCERIN 50 MCG: 5 INJECTION, SOLUTION INTRAVENOUS at 08:19

## 2025-04-17 RX ADMIN — ASPIRIN 81 MG CHEWABLE TABLET 81 MG: 81 TABLET CHEWABLE at 11:57

## 2025-04-17 RX ADMIN — SODIUM CHLORIDE: 9 INJECTION, SOLUTION INTRAVENOUS at 06:54

## 2025-04-17 RX ADMIN — SODIUM CHLORIDE 2.7 UNITS/HR: 9 INJECTION, SOLUTION INTRAVENOUS at 23:20

## 2025-04-17 RX ADMIN — Medication 100 MCG: at 09:54

## 2025-04-17 RX ADMIN — Medication 100 MCG: at 09:52

## 2025-04-17 RX ADMIN — PROTAMINE SULFATE 200 MG: 10 INJECTION, SOLUTION INTRAVENOUS at 09:34

## 2025-04-17 RX ADMIN — CELECOXIB 200 MG: 100 CAPSULE ORAL at 05:53

## 2025-04-17 RX ADMIN — PANTOPRAZOLE SODIUM 40 MG: 40 INJECTION, POWDER, FOR SOLUTION INTRAVENOUS at 11:56

## 2025-04-17 RX ADMIN — Medication 50 MCG: at 08:14

## 2025-04-17 RX ADMIN — COLLAGENASE SANTYL: 250 OINTMENT TOPICAL at 15:06

## 2025-04-17 RX ADMIN — AMIODARONE HYDROCHLORIDE 1 MG/MIN: 1.8 INJECTION, SOLUTION INTRAVENOUS at 08:38

## 2025-04-17 RX ADMIN — VASOPRESSIN 2 UNITS: 20 INJECTION INTRAVENOUS at 10:27

## 2025-04-17 RX ADMIN — Medication 100 MCG: at 09:34

## 2025-04-17 RX ADMIN — Medication 20 MG: at 07:06

## 2025-04-17 RX ADMIN — CALCIUM CHLORIDE 0.5 G: 100 INJECTION INTRAVENOUS; INTRAVENTRICULAR at 09:41

## 2025-04-17 RX ADMIN — ASPIRIN 81 MG: 81 TABLET, COATED ORAL at 11:59

## 2025-04-17 RX ADMIN — ALBUMIN (HUMAN) 12.5 G: 12.5 INJECTION, SOLUTION INTRAVENOUS at 13:11

## 2025-04-17 RX ADMIN — OXYCODONE HYDROCHLORIDE 5 MG: 5 TABLET ORAL at 20:45

## 2025-04-17 RX ADMIN — AMIODARONE HYDROCHLORIDE 150 MG: 50 INJECTION, SOLUTION INTRAVENOUS at 08:58

## 2025-04-17 RX ADMIN — AMINOCAPROIC ACID 5000 MG: 250 INJECTION, SOLUTION INTRAVENOUS at 07:02

## 2025-04-17 RX ADMIN — DEXAMETHASONE SODIUM PHOSPHATE 10 MG: 10 INJECTION INTRAMUSCULAR; INTRAVENOUS at 07:03

## 2025-04-17 RX ADMIN — IPRATROPIUM BROMIDE AND ALBUTEROL SULFATE 1 DOSE: 2.5; .5 SOLUTION RESPIRATORY (INHALATION) at 12:39

## 2025-04-17 RX ADMIN — LIDOCAINE HYDROCHLORIDE 100 MG: 20 INJECTION, SOLUTION INTRAVENOUS at 06:54

## 2025-04-17 RX ADMIN — AMINOCAPROIC ACID 0.08 G/HR: 250 INJECTION, SOLUTION INTRAVENOUS at 07:09

## 2025-04-17 RX ADMIN — ALBUMIN (HUMAN) 25 G: 12.5 INJECTION, SOLUTION INTRAVENOUS at 11:16

## 2025-04-17 RX ADMIN — SODIUM CHLORIDE, PRESERVATIVE FREE 10 ML: 5 INJECTION INTRAVENOUS at 20:40

## 2025-04-17 RX ADMIN — SODIUM BICARBONATE 50 MEQ: 84 INJECTION INTRAVENOUS at 12:08

## 2025-04-17 RX ADMIN — VASOPRESSIN 2 UNITS: 20 INJECTION INTRAVENOUS at 10:17

## 2025-04-17 RX ADMIN — Medication 100 MCG: at 08:28

## 2025-04-17 RX ADMIN — OXYCODONE HYDROCHLORIDE 10 MG: 10 TABLET, FILM COATED, EXTENDED RELEASE ORAL at 05:53

## 2025-04-17 RX ADMIN — VECURONIUM BROMIDE 20 MG: 1 INJECTION, POWDER, LYOPHILIZED, FOR SOLUTION INTRAVENOUS at 06:54

## 2025-04-17 RX ADMIN — Medication 100 MCG: at 07:13

## 2025-04-17 RX ADMIN — INSULIN HUMAN 7 UNITS: 100 INJECTION, SOLUTION PARENTERAL at 08:57

## 2025-04-17 RX ADMIN — WATER 2000 MG: 1 INJECTION INTRAMUSCULAR; INTRAVENOUS; SUBCUTANEOUS at 17:41

## 2025-04-17 RX ADMIN — IPRATROPIUM BROMIDE AND ALBUTEROL SULFATE 1 DOSE: 2.5; .5 SOLUTION RESPIRATORY (INHALATION) at 21:49

## 2025-04-17 RX ADMIN — CHLORHEXIDINE GLUCONATE, 0.12% ORAL RINSE 15 ML: 1.2 SOLUTION DENTAL at 12:08

## 2025-04-17 RX ADMIN — MIDAZOLAM 2 MG: 1 INJECTION INTRAMUSCULAR; INTRAVENOUS at 06:26

## 2025-04-17 RX ADMIN — PHENYLEPHRINE HYDROCHLORIDE 100 MCG: 10 INJECTION INTRAVENOUS at 07:26

## 2025-04-17 RX ADMIN — ACETAMINOPHEN 1000 MG: 500 TABLET ORAL at 05:53

## 2025-04-17 RX ADMIN — MIDAZOLAM 2 MG: 1 INJECTION INTRAMUSCULAR; INTRAVENOUS at 06:51

## 2025-04-17 RX ADMIN — INSULIN LISPRO 4 UNITS: 100 INJECTION, SOLUTION INTRAVENOUS; SUBCUTANEOUS at 11:56

## 2025-04-17 RX ADMIN — VASOPRESSIN 1 UNITS: 20 INJECTION INTRAVENOUS at 10:00

## 2025-04-17 RX ADMIN — Medication 50 MCG: at 08:17

## 2025-04-17 RX ADMIN — HYDROMORPHONE HYDROCHLORIDE 0.25 MG: 1 INJECTION, SOLUTION INTRAMUSCULAR; INTRAVENOUS; SUBCUTANEOUS at 14:11

## 2025-04-17 RX ADMIN — HEPARIN SODIUM 30000 UNITS: 10000 INJECTION INTRAVENOUS; SUBCUTANEOUS at 08:11

## 2025-04-17 RX ADMIN — SODIUM CHLORIDE: 0.9 INJECTION, SOLUTION INTRAVENOUS at 10:43

## 2025-04-17 RX ADMIN — PROPOFOL 90 MG: 10 INJECTION, EMULSION INTRAVENOUS at 06:54

## 2025-04-17 RX ADMIN — SODIUM CHLORIDE, SODIUM LACTATE, POTASSIUM CHLORIDE, CALCIUM CHLORIDE: 600; 310; 30; 20 INJECTION, SOLUTION INTRAVENOUS at 07:05

## 2025-04-17 RX ADMIN — CALCIUM CHLORIDE 0.5 G: 100 INJECTION INTRAVENOUS; INTRAVENTRICULAR at 09:43

## 2025-04-17 RX ADMIN — DEXAMETHASONE SODIUM PHOSPHATE 10 MG: 10 INJECTION INTRAMUSCULAR; INTRAVENOUS at 06:55

## 2025-04-17 RX ADMIN — Medication 50 MCG: at 09:49

## 2025-04-17 RX ADMIN — AMIODARONE HYDROCHLORIDE 150 MG: 50 INJECTION, SOLUTION INTRAVENOUS at 08:35

## 2025-04-17 RX ADMIN — HYDROMORPHONE HYDROCHLORIDE 0.5 MG: 1 INJECTION, SOLUTION INTRAMUSCULAR; INTRAVENOUS; SUBCUTANEOUS at 17:43

## 2025-04-17 RX ADMIN — SENNOSIDES AND DOCUSATE SODIUM 1 TABLET: 50; 8.6 TABLET ORAL at 20:43

## 2025-04-17 RX ADMIN — VASOPRESSIN 1 UNITS: 20 INJECTION INTRAVENOUS at 10:13

## 2025-04-17 RX ADMIN — CEFAZOLIN 2000 MG: 2 INJECTION, POWDER, FOR SOLUTION INTRAMUSCULAR; INTRAVENOUS at 09:40

## 2025-04-17 RX ADMIN — KETOROLAC TROMETHAMINE 15 MG: 30 INJECTION, SOLUTION INTRAMUSCULAR at 23:03

## 2025-04-17 RX ADMIN — KETOROLAC TROMETHAMINE 15 MG: 30 INJECTION, SOLUTION INTRAMUSCULAR at 16:07

## 2025-04-17 RX ADMIN — MUPIROCIN: 20 OINTMENT TOPICAL at 20:39

## 2025-04-17 RX ADMIN — SENNOSIDES AND DOCUSATE SODIUM 1 TABLET: 50; 8.6 TABLET ORAL at 12:08

## 2025-04-17 RX ADMIN — POTASSIUM CHLORIDE 20 MEQ: 29.8 INJECTION INTRAVENOUS at 18:24

## 2025-04-17 RX ADMIN — Medication 50 MCG: at 08:01

## 2025-04-17 RX ADMIN — VASOPRESSIN 5 UNITS: 20 INJECTION INTRAVENOUS at 09:37

## 2025-04-17 RX ADMIN — CHLORHEXIDINE GLUCONATE, 0.12% ORAL RINSE 15 ML: 1.2 SOLUTION DENTAL at 20:39

## 2025-04-17 RX ADMIN — POTASSIUM CHLORIDE 20 MEQ: 400 INJECTION, SOLUTION INTRAVENOUS at 10:08

## 2025-04-17 RX ADMIN — ACETAMINOPHEN 1000 MG: 500 TABLET ORAL at 11:57

## 2025-04-17 RX ADMIN — INSULIN HUMAN 5 UNITS: 100 INJECTION, SOLUTION PARENTERAL at 09:17

## 2025-04-17 RX ADMIN — SODIUM CHLORIDE, POTASSIUM CHLORIDE, SODIUM LACTATE AND CALCIUM CHLORIDE: 600; 310; 30; 20 INJECTION, SOLUTION INTRAVENOUS at 09:48

## 2025-04-17 ASSESSMENT — PAIN DESCRIPTION - LOCATION
LOCATION: CHEST

## 2025-04-17 ASSESSMENT — PULMONARY FUNCTION TESTS
PIF_VALUE: 16
PIF_VALUE: 0
PIF_VALUE: 25
PIF_VALUE: 24
PIF_VALUE: 25
PIF_VALUE: 16
PIF_VALUE: 25
PIF_VALUE: 0
PIF_VALUE: 24
PIF_VALUE: 26
PIF_VALUE: 25
PIF_VALUE: 16
PIF_VALUE: 0
PIF_VALUE: 0
PIF_VALUE: 16
PIF_VALUE: 16
PIF_VALUE: 26
PIF_VALUE: 26
PIF_VALUE: 18
PIF_VALUE: 0

## 2025-04-17 ASSESSMENT — PAIN DESCRIPTION - DESCRIPTORS
DESCRIPTORS: ACHING;SORE;DISCOMFORT
DESCRIPTORS: ACHING;DISCOMFORT
DESCRIPTORS: ACHING;DISCOMFORT
DESCRIPTORS: DISCOMFORT;ACHING
DESCRIPTORS: DISCOMFORT;ACHING
DESCRIPTORS: ACHING;DISCOMFORT
DESCRIPTORS: ACHING;DISCOMFORT

## 2025-04-17 ASSESSMENT — PAIN DESCRIPTION - PAIN TYPE
TYPE: SURGICAL PAIN
TYPE: SURGICAL PAIN
TYPE: SURGICAL PAIN;ACUTE PAIN
TYPE: SURGICAL PAIN
TYPE: SURGICAL PAIN

## 2025-04-17 ASSESSMENT — PAIN SCALES - GENERAL
PAINLEVEL_OUTOF10: 7
PAINLEVEL_OUTOF10: 7
PAINLEVEL_OUTOF10: 0
PAINLEVEL_OUTOF10: 6
PAINLEVEL_OUTOF10: 0
PAINLEVEL_OUTOF10: 3
PAINLEVEL_OUTOF10: 4
PAINLEVEL_OUTOF10: 4
PAINLEVEL_OUTOF10: 6
PAINLEVEL_OUTOF10: 5
PAINLEVEL_OUTOF10: 6
PAINLEVEL_OUTOF10: 7

## 2025-04-17 ASSESSMENT — PAIN - FUNCTIONAL ASSESSMENT
PAIN_FUNCTIONAL_ASSESSMENT: ACTIVITIES ARE NOT PREVENTED
PAIN_FUNCTIONAL_ASSESSMENT: 0-10
PAIN_FUNCTIONAL_ASSESSMENT: ACTIVITIES ARE NOT PREVENTED
PAIN_FUNCTIONAL_ASSESSMENT: ACTIVITIES ARE NOT PREVENTED

## 2025-04-17 ASSESSMENT — PAIN DESCRIPTION - FREQUENCY
FREQUENCY: CONTINUOUS

## 2025-04-17 ASSESSMENT — PAIN DESCRIPTION - ORIENTATION
ORIENTATION: MID

## 2025-04-17 ASSESSMENT — PAIN DESCRIPTION - ONSET
ONSET: ON-GOING

## 2025-04-17 ASSESSMENT — LIFESTYLE VARIABLES: SMOKING_STATUS: 0

## 2025-04-17 NOTE — OP NOTE
[facility]              [address] [place] [address]                            OPERATIVE REPORT      PATIENT NAME: LUCIO ORELLANA             : 1960  MED REC NO: [id number]                        ROOM: [id number]  ACCOUNT NO: [id number]                       ADMIT DATE: 2025  PROVIDER: Baltazar Sterling MD      DATE OF PROCEDURE:  2025    SURGEON:  Baltazar Sterling MD    PREOPERATIVE DIAGNOSES:  Severe multivessel coronary artery disease, critical left main coronary artery stenosis, not amenable to percutaneous coronary intervention; history of heavy alcohol abuse; cirrhosis; peripheral vascular disease; moderate aortic stenosis; mild-to-moderate mitral regurgitation.    POSTOPERATIVE DIAGNOSES:  Severe multivessel coronary artery disease, critical left main coronary artery stenosis, not amenable to percutaneous coronary intervention; history of heavy alcohol abuse; cirrhosis; peripheral vascular disease; moderate aortic stenosis; mild-to-moderate mitral regurgitation.    INDICATIONS:  Severe multivessel coronary artery disease, critical left main coronary artery stenosis, not amenable to percutaneous coronary intervention; history of heavy alcohol abuse; cirrhosis; peripheral vascular disease; moderate aortic stenosis; mild-to-moderate mitral regurgitation.    ASSISTANT:  Lisa Chang (no other resident who was adequately trained was available to assist).  Lisa Chang was present assisting throughout the entire operation from first incision to last suture and the part in between.    SECOND ASSISTANT:  Aparna Bains (Aparna Bains harvested the vein).    COMPLICATIONS:  None, tolerated well.    ESTIMATED BLOOD LOSS:  Approximately 1000 mL.    ANESTHESIA:  General endotracheal.    ANESTHESIA ATTENDING:  Dr. Blaze Laurent.    SPECIMENS:  None.    DRAINS:  Two in the mediastinum, 1 in the left chest and 1 in the right chest.    PROCEDURES PERFORMED:

## 2025-04-17 NOTE — ANESTHESIA POSTPROCEDURE EVALUATION
Department of Anesthesiology  Postprocedure Note    Patient: Jerome Steel  MRN: [id number]  YOB: 1960  Date of evaluation: 4/17/2025    Procedure Summary       Date: 04/17/25 Room / Location: [address]    Anesthesia Start: 0626 Anesthesia Stop: 1048    Procedure: Pump Assist Coronary Artery Bypass Grafting Diagnosis:       Coronary artery disease involving native coronary artery of native heart without angina pectoris      (Coronary artery disease involving native coronary artery of native heart without angina pectoris [I25.10])    Surgeons: Baltazar Sterling MD Responsible Provider: Jerome Laurent DO    Anesthesia Type: General ASA Status: 4            Anesthesia Type: General    Stephen Phase I: Stephen Score: 5    Stephen Phase II:      Anesthesia Post Evaluation    Patient location during evaluation: ICU  Patient participation: complete - patient cannot participate  Level of consciousness: sedated and ventilated  Airway patency: patent  Nausea & Vomiting: no nausea and no vomiting  Cardiovascular status: blood pressure returned to baseline  Respiratory status: acceptable  Hydration status: euvolemic  Multimodal analgesia pain management approach    No notable events documented.

## 2025-04-17 NOTE — BRIEF OP NOTE
Brief Postoperative Note      Jerome Steel  YOB: 1960  [id number]    Pre-operative Diagnosis: CAD, critical LM not amenable to PCI    Post-operative Diagnosis: Same    Operation: Sternotomy/Pump assist CABG x 2 (LIMA-LAD, SVG-OM2)/MAHAD exclusion with 40mm AtriClip/Posterior pericardotomy/EVH LLE/Rigid internal fixation of the sternum with William plates x 2/22 modifier    Anesthesia: General    Surgeon: Asher    Assistant:  First Assistant: Aparna Bains APRN - CNP  Physician Assistant: Jillian Chang PA    Anesthesia: General    Estimated Blood Loss (mL): 1000    Complications: None    Specimens:   * No specimens in log *    Implants:  Implant Name Type Inv. Item Serial No.  Lot No. LRB No. Used Action   DEVICE MAHAD EXCLUSION CLP L 40 MM NIT SPRING POLYESTER CVR - [id number]  DEVICE MAHAD EXCLUSION CLP L 40 MM NIT SPRING POLYESTER CVR  [employer]WD [id number] N/A 1 Implanted   PLATE FIXATION STERNAL HEX 6 HOLES - [id number]  PLATE FIXATION STERNAL HEX 6 HOLES  WILLIAM LAURA-WD  N/A 1 Implanted   PLATE FIXATION STERNAL LADDER 10 HOLES - [id number]  PLATE FIXATION STERNAL LADDER 10 HOLES  WILLIAM LAURA-WD  N/A 1 Implanted   SCREW BNE SELF DRILLING 2.3X14 MM LCK - [id number]  SCREW BNE SELF DRILLING 2.3X14 MM LCK  WILLIAM CRANIOMAXILLOFACIAL-WD  N/A 6 Implanted   SCREW LOCKING SD 2.3X15MM 5P - [id number]  SCREW LOCKING SD 2.3X15MM 5P  WILLIAM CRANIOMAXILLOFACIAL-WD  N/A 5 Implanted   SCREW LOCKING SD 2.3X16MM 5P - [id number]  SCREW LOCKING SD 2.3X16MM 5P  WILLIAM CRANIOMAXILLOFACIAL-WD  N/A 2 Implanted   SCREW LOCKING SD 2.3X18MM 5P - [id number]  SCREW LOCKING SD 2.3X18MM 5P  WILLIAM CRANIOMAXILLOFACIAL-WD  N/A 1 Implanted         Drains:   Chest Tube Anterior Mediastinal 1 (Active)   Chest Tube Airleak No 04/17/25 0938   Status Continuous Suction 04/17/25 0938   Suction -20 cm H2O 04/17/25 0938   Y Connector Used No 04/17/25 0938   Dressing Status New dressing applied;Clean, dry &

## 2025-04-17 NOTE — ANESTHESIA PRE PROCEDURE
Component Value Date/Time    PROTIME 12.2 04/10/2025 10:26 AM    INR 1.1 04/10/2025 10:26 AM    APTT 32.5 04/10/2025 10:26 AM    APTT 34.1 11/15/2015 10:46 AM       HCG (If Applicable): No results found for: \"PREGTESTUR\", \"PREGSERUM\", \"HCG\", \"HCGQUANT\"     ABGs: No results found for: \"PHART\", \"PO2ART\", \"TIQ9UUX\", \"BFJ4GYG\", \"BEART\", \"V2ZNWKGX\"     Type & Screen (If Applicable):  Lab Results   Component Value Date    ABORH O POSITIVE 04/10/2025    LABANTI NEGATIVE 04/10/2025       Drug/Infectious Status (If Applicable):  No results found for: \"HIV\", \"HEPCAB\"    COVID-19 Screening (If Applicable):   Lab Results   Component Value Date/Time    COVID19 Not Detected 02/17/2025 03:23 PM           Anesthesia Evaluation  Patient summary reviewed and Nursing notes reviewed   no history of anesthetic complications:   Airway: Mallampati: II  TM distance: >3 FB   Neck ROM: full  Mouth opening: > = 3 FB   Dental:          Pulmonary: breath sounds clear to auscultation  (+)  COPD:              (-) not a current smoker                           Cardiovascular:  Exercise tolerance: good (>4 METS)  (+) hypertension:, past MI: > 6 months, CAD:, hyperlipidemia      ECG reviewed  Rhythm: regular  Rate: normal  Echocardiogram reviewed  Stress test reviewed       Beta Blocker:  Not on Beta Blocker      ROS comment: EKG:  Normal sinus rhythm  Nonspecific ST abnormality  Abnormal ECG  When compared with ECG of 24-FEB-2025 09:21,  No significant change was found  Confirmed by Ronald Kaur ([id number]) on 4/10/2025 4:45:49 PM    Specimen Collected: 04/10/25 10:42 EDT Last Resulted: 04/10/25 16:45 EDT  Echo:  2-26-25  ·  Left Ventricle: Normal left ventricular systolic function with a visually estimated EF of 60 - 65%. Normal wall motion.  ·  Aortic Valve: Trileaflet valve. Mildly calcified cusps. Mild regurgitation. Moderate stenosis of the aortic valve. AV mean gradient is 9 mmHg. AV peak velocity is 1.9 m/s. LVOT:AV VTI Index is 0.45. AV area

## 2025-04-17 NOTE — ANESTHESIA PROCEDURE NOTES
Central Venous Line:    A central venous line was placed using ultrasound guidance, in the OR for the following indication(s): central venous access.    Sterility preparation included the following: provider used sterile gloves, gown, hat and mask and maximum sterile barriers used during central venous catheter insertion.    The patient was placed in Trendelenburg position.The right internal jugular vein was prepped.    The site was prepped with Chloraprep.  A 8.5 Fr (size), introducer SLIC was placed.    During the procedure, the following specific steps were taken: target vein identified, needle advanced into vein and blood aspirated and guidewire advanced into vein.    Intravenous verification was obtained by ultrasound and venous blood return.    Post insertion care included: all ports aspirated, all ports flushed easily, guidewire removed intact, Biopatch applied, line sutured in place and dressing applied.    During the procedure the patient experienced: patient tolerated procedure well with no complications.      Outcomes: uncomplicated and patient tolerated procedure well  Real-time US image taken/store: Yes  Anesthesia type: local..No  Staffing  Performed: Anesthesiologist   Anesthesiologist: Jerome Laurent DO  Performed by: Jerome Laurent DO  Authorized by: Jerome Laurent DO    Preanesthetic Checklist  Completed: patient identified, IV checked, site marked, risks and benefits discussed, surgical/procedural consents, equipment checked, pre-op evaluation, timeout performed, anesthesia consent given, oxygen available, monitors applied/VS acknowledged, fire risk safety assessment completed and verbalized and blood product R/B/A discussed and consented

## 2025-04-17 NOTE — ANESTHESIA PROCEDURE NOTES
Arterial Line:    An arterial line was placed using ultrasound guidance, in the OR for the following indication(s): continuous blood pressure monitoring and blood sampling needed.    A 20 gauge (size), 12 cm (length), Angiocath (type) catheter was placed, Seldinger technique used, into the right brachial artery, secured by Tegaderm and tape.  Anesthesia type: Local  Local infiltration: Injection    Events:  patient tolerated procedure well with no complications.4/17/2025 6:45 AM4/17/2025 7:00 AM  Anesthesiologist: Jerome Laurent DO  Resident/CRNA: Camille Marino APRN - CRNA  Other anesthesia staff: Martinez Montenegro RN  Performed: Other anesthesia staff   Preanesthetic Checklist  Completed: patient identified, IV checked, site marked, risks and benefits discussed, surgical/procedural consents, equipment checked, pre-op evaluation, timeout performed, anesthesia consent given, oxygen available and monitors applied/VS acknowledged

## 2025-04-17 NOTE — ANESTHESIA PROCEDURE NOTES
Procedure Performed: LYRIC       Start Time:        End Time:      Anesthesia Information  Performed Personally  Anesthesiologist:  Jerome Laurent DO      Echocardiogram Comments:       All findings d/w surgeon    Preanesthesia Checklist:  Patient identified, IV assessed, risks and benefits discussed, monitors and equipment assessed, procedure being performed at surgeon's request and anesthesia consent obtained.    General Procedure Information  Diagnostic Indications for Echo:  assessment of surgical repair, hemodynamic monitoring and assessment of valve function  Physician Requesting Echo: Baltazar Sterling MD  Location performed:  OR  Intubated  Bite block placed  Heart visualized  Probe Insertion:  Easy  Probe Type:  3D and mulitplane  Modalities:  3D, color flow mapping, pulse wave Doppler and continuous wave Doppler    Echocardiographic and Doppler Measurements    Ventricles    Ventricle  Cavity Size  Cavity          Dimension  Hypertrophy  Thrombus  Global FXN  EF    RV  normal    No  No  normal      LV  normal    No  No  normal (50-70%)         Ventricular Regional Function:  1- Basal Anteroseptal:  normal  2- Basal Anterior:  normal  3- Basal Anterolateral:  normal  4- Basal Inferolateral:  normal  5- Basal Inferior:  normal  6- Basal Inferoseptal:  normal  7- Mid Anteroseptal:  hypokinetic  8- Mid Anterior:  normal  9- Mid Anterolateral:  normal  10- Mid Inferolateral:  normal  11- Mid Inferior:  normal  12- Mid Inferoseptal:  normal  13- Apical Anterior:  normal  14- Apical Lateral:  normal  15- Apical Inferior:  normal  16- Apical Septal:  normal  17- [place]:  normal        Valves     Valves  Annulus  Stenosis Measurements   Regurg  Leaflet   Morph  Leaflet   Motion Valve Comments    Aortic normal Stenosis mild.            mild   calcified normal RCC most calcified      Mitral normal none             mild calcified normal Posterior leaflet calcfication    Tricuspid normal   not present         mild

## 2025-04-18 ENCOUNTER — APPOINTMENT (OUTPATIENT)
Dept: GENERAL RADIOLOGY | Age: 65
DRG: 235 | End: 2025-04-18
Attending: THORACIC SURGERY (CARDIOTHORACIC VASCULAR SURGERY)
Payer: COMMERCIAL

## 2025-04-18 LAB
ALBUMIN SERPL-MCNC: 3.4 G/DL (ref 3.5–5.2)
ALP SERPL-CCNC: 73 U/L (ref 40–129)
ALT SERPL-CCNC: 7 U/L (ref 0–50)
ANION GAP SERPL CALCULATED.3IONS-SCNC: 10 MMOL/L (ref 7–16)
AST SERPL-CCNC: 24 U/L (ref 0–50)
B.E.: -1.2 MMOL/L (ref -3–3)
BILIRUB SERPL-MCNC: 0.3 MG/DL (ref 0–1.2)
BUN SERPL-MCNC: 17 MG/DL (ref 8–23)
CA-I BLD-SCNC: 1.21 MMOL/L (ref 1.15–1.33)
CALCIUM SERPL-MCNC: 8.9 MG/DL (ref 8.8–10.2)
CHLORIDE SERPL-SCNC: 106 MMOL/L (ref 98–107)
CO2 SERPL-SCNC: 22 MMOL/L (ref 22–29)
COHB: 0.9 % (ref 0–1.5)
CREAT SERPL-MCNC: 0.6 MG/DL (ref 0.7–1.2)
CRITICAL: ABNORMAL
DATE ANALYZED: ABNORMAL
DATE OF COLLECTION: ABNORMAL
ERYTHROCYTE [DISTWIDTH] IN BLOOD BY AUTOMATED COUNT: 17.5 % (ref 11.5–15)
GFR, ESTIMATED: >90 ML/MIN/1.73M2
GLUCOSE BLD-MCNC: 111 MG/DL (ref 74–99)
GLUCOSE BLD-MCNC: 130 MG/DL (ref 74–99)
GLUCOSE BLD-MCNC: 150 MG/DL (ref 74–99)
GLUCOSE BLD-MCNC: 157 MG/DL (ref 74–99)
GLUCOSE BLD-MCNC: 183 MG/DL (ref 74–99)
GLUCOSE BLD-MCNC: 198 MG/DL (ref 74–99)
GLUCOSE BLD-MCNC: 198 MG/DL (ref 74–99)
GLUCOSE BLD-MCNC: 236 MG/DL (ref 74–99)
GLUCOSE BLD-MCNC: 83 MG/DL (ref 74–99)
GLUCOSE BLD-MCNC: 94 MG/DL (ref 74–99)
GLUCOSE SERPL-MCNC: 108 MG/DL (ref 74–99)
HCO3: 23.6 MMOL/L (ref 22–26)
HCT VFR BLD AUTO: 23.5 % (ref 37–54)
HGB BLD-MCNC: 7.6 G/DL (ref 12.5–16.5)
HHB: 1 % (ref 0–5)
INR PPP: 1.3
LAB: ABNORMAL
Lab: 415
MAGNESIUM SERPL-MCNC: 2.2 MG/DL (ref 1.6–2.4)
MCH RBC QN AUTO: 29.2 PG (ref 26–35)
MCHC RBC AUTO-ENTMCNC: 32.3 G/DL (ref 32–34.5)
MCV RBC AUTO: 90.4 FL (ref 80–99.9)
METHB: 0.2 % (ref 0–1.5)
MODE: ABNORMAL
O2 SATURATION: 99 % (ref 92–98.5)
O2HB: 97.9 % (ref 94–97)
OPERATOR ID: ABNORMAL
PATIENT TEMP: 37 C
PCO2: 39.7 MMHG (ref 35–45)
PH BLOOD GAS: 7.39 (ref 7.35–7.45)
PLATELET # BLD AUTO: 165 K/UL (ref 130–450)
PMV BLD AUTO: 9.5 FL (ref 7–12)
PO2: 136.7 MMHG (ref 75–100)
POTASSIUM SERPL-SCNC: 4.3 MMOL/L (ref 3.5–5.1)
POTASSIUM SERPL-SCNC: 4.4 MMOL/L (ref 3.5–5.1)
POTASSIUM SERPL-SCNC: 4.5 MMOL/L (ref 3.5–5.1)
PROT SERPL-MCNC: 5.8 G/DL (ref 6.4–8.3)
PROTHROMBIN TIME: 14.2 SEC (ref 9.3–12.4)
RBC # BLD AUTO: 2.6 M/UL (ref 3.8–5.8)
SODIUM SERPL-SCNC: 138 MMOL/L (ref 136–145)
SOURCE, BLOOD GAS: ABNORMAL
THB: 8.4 G/DL (ref 11.5–16.5)
TIME ANALYZED: 421
WBC OTHER # BLD: 14.4 K/UL (ref 4.5–11.5)

## 2025-04-18 PROCEDURE — 6370000000 HC RX 637 (ALT 250 FOR IP): Performed by: NURSE PRACTITIONER

## 2025-04-18 PROCEDURE — 97530 THERAPEUTIC ACTIVITIES: CPT

## 2025-04-18 PROCEDURE — P9045 ALBUMIN (HUMAN), 5%, 250 ML: HCPCS | Performed by: PHYSICIAN ASSISTANT

## 2025-04-18 PROCEDURE — 97163 PT EVAL HIGH COMPLEX 45 MIN: CPT

## 2025-04-18 PROCEDURE — 2500000003 HC RX 250 WO HCPCS: Performed by: PHYSICIAN ASSISTANT

## 2025-04-18 PROCEDURE — 82805 BLOOD GASES W/O2 SATURATION: CPT

## 2025-04-18 PROCEDURE — 2140000000 HC CCU INTERMEDIATE R&B

## 2025-04-18 PROCEDURE — 82962 GLUCOSE BLOOD TEST: CPT

## 2025-04-18 PROCEDURE — 6370000000 HC RX 637 (ALT 250 FOR IP): Performed by: PHYSICIAN ASSISTANT

## 2025-04-18 PROCEDURE — 36415 COLL VENOUS BLD VENIPUNCTURE: CPT

## 2025-04-18 PROCEDURE — 85027 COMPLETE CBC AUTOMATED: CPT

## 2025-04-18 PROCEDURE — 2500000003 HC RX 250 WO HCPCS: Performed by: NURSE PRACTITIONER

## 2025-04-18 PROCEDURE — 84132 ASSAY OF SERUM POTASSIUM: CPT

## 2025-04-18 PROCEDURE — 94669 MECHANICAL CHEST WALL OSCILL: CPT

## 2025-04-18 PROCEDURE — 94640 AIRWAY INHALATION TREATMENT: CPT

## 2025-04-18 PROCEDURE — 6360000002 HC RX W HCPCS: Performed by: NURSE PRACTITIONER

## 2025-04-18 PROCEDURE — 2700000000 HC OXYGEN THERAPY PER DAY

## 2025-04-18 PROCEDURE — 83735 ASSAY OF MAGNESIUM: CPT

## 2025-04-18 PROCEDURE — 37799 UNLISTED PX VASCULAR SURGERY: CPT

## 2025-04-18 PROCEDURE — 97166 OT EVAL MOD COMPLEX 45 MIN: CPT

## 2025-04-18 PROCEDURE — 6370000000 HC RX 637 (ALT 250 FOR IP): Performed by: INTERNAL MEDICINE

## 2025-04-18 PROCEDURE — 71045 X-RAY EXAM CHEST 1 VIEW: CPT

## 2025-04-18 PROCEDURE — 94660 CPAP INITIATION&MGMT: CPT

## 2025-04-18 PROCEDURE — 82330 ASSAY OF CALCIUM: CPT

## 2025-04-18 PROCEDURE — 97535 SELF CARE MNGMENT TRAINING: CPT

## 2025-04-18 PROCEDURE — 80053 COMPREHEN METABOLIC PANEL: CPT

## 2025-04-18 PROCEDURE — 85610 PROTHROMBIN TIME: CPT

## 2025-04-18 PROCEDURE — 6360000002 HC RX W HCPCS: Performed by: PHYSICIAN ASSISTANT

## 2025-04-18 PROCEDURE — 99222 1ST HOSP IP/OBS MODERATE 55: CPT | Performed by: INTERNAL MEDICINE

## 2025-04-18 RX ORDER — DIPHENHYDRAMINE HCL 25 MG
25 TABLET ORAL EVERY 8 HOURS PRN
Status: DISCONTINUED | OUTPATIENT
Start: 2025-04-18 | End: 2025-04-24 | Stop reason: HOSPADM

## 2025-04-18 RX ORDER — FOLIC ACID 1 MG/1
1 TABLET ORAL DAILY
Status: DISCONTINUED | OUTPATIENT
Start: 2025-04-18 | End: 2025-04-24 | Stop reason: HOSPADM

## 2025-04-18 RX ORDER — INSULIN LISPRO 100 [IU]/ML
2 INJECTION, SOLUTION INTRAVENOUS; SUBCUTANEOUS
Status: DISCONTINUED | OUTPATIENT
Start: 2025-04-19 | End: 2025-04-20

## 2025-04-18 RX ORDER — DEXTROSE MONOHYDRATE 100 MG/ML
INJECTION, SOLUTION INTRAVENOUS CONTINUOUS PRN
Status: DISCONTINUED | OUTPATIENT
Start: 2025-04-18 | End: 2025-04-24 | Stop reason: HOSPADM

## 2025-04-18 RX ORDER — MAGNESIUM SULFATE IN WATER 40 MG/ML
2000 INJECTION, SOLUTION INTRAVENOUS PRN
Status: DISCONTINUED | OUTPATIENT
Start: 2025-04-18 | End: 2025-04-24 | Stop reason: HOSPADM

## 2025-04-18 RX ORDER — AMIODARONE HYDROCHLORIDE 200 MG/1
400 TABLET ORAL PRN
Status: DISCONTINUED | OUTPATIENT
Start: 2025-04-18 | End: 2025-04-24 | Stop reason: HOSPADM

## 2025-04-18 RX ORDER — ACETAMINOPHEN 325 MG/1
650 TABLET ORAL EVERY 4 HOURS PRN
Status: DISCONTINUED | OUTPATIENT
Start: 2025-04-18 | End: 2025-04-24 | Stop reason: HOSPADM

## 2025-04-18 RX ORDER — POTASSIUM CHLORIDE 1500 MG/1
20 TABLET, EXTENDED RELEASE ORAL PRN
Status: DISCONTINUED | OUTPATIENT
Start: 2025-04-18 | End: 2025-04-24 | Stop reason: HOSPADM

## 2025-04-18 RX ORDER — POLYETHYLENE GLYCOL 3350 17 G/17G
17 POWDER, FOR SOLUTION ORAL DAILY
Status: DISCONTINUED | OUTPATIENT
Start: 2025-04-18 | End: 2025-04-18 | Stop reason: SDUPTHER

## 2025-04-18 RX ORDER — PROCHLORPERAZINE EDISYLATE 5 MG/ML
10 INJECTION INTRAMUSCULAR; INTRAVENOUS EVERY 6 HOURS PRN
Status: DISCONTINUED | OUTPATIENT
Start: 2025-04-18 | End: 2025-04-24 | Stop reason: HOSPADM

## 2025-04-18 RX ORDER — ASPIRIN 81 MG/1
81 TABLET ORAL DAILY
Status: DISCONTINUED | OUTPATIENT
Start: 2025-04-18 | End: 2025-04-24 | Stop reason: HOSPADM

## 2025-04-18 RX ORDER — FERROUS SULFATE 325(65) MG
325 TABLET ORAL 2 TIMES DAILY WITH MEALS
Status: DISCONTINUED | OUTPATIENT
Start: 2025-04-18 | End: 2025-04-24 | Stop reason: HOSPADM

## 2025-04-18 RX ORDER — ASCORBIC ACID 500 MG
500 TABLET ORAL 2 TIMES DAILY
Status: DISCONTINUED | OUTPATIENT
Start: 2025-04-18 | End: 2025-04-24 | Stop reason: HOSPADM

## 2025-04-18 RX ORDER — INSULIN LISPRO 100 [IU]/ML
0-16 INJECTION, SOLUTION INTRAVENOUS; SUBCUTANEOUS
Status: DISCONTINUED | OUTPATIENT
Start: 2025-04-18 | End: 2025-04-18

## 2025-04-18 RX ORDER — GLUCAGON 1 MG/ML
1 KIT INJECTION PRN
Status: DISCONTINUED | OUTPATIENT
Start: 2025-04-18 | End: 2025-04-24 | Stop reason: HOSPADM

## 2025-04-18 RX ORDER — BISACODYL 5 MG/1
5 TABLET, DELAYED RELEASE ORAL DAILY
Status: DISCONTINUED | OUTPATIENT
Start: 2025-04-19 | End: 2025-04-24 | Stop reason: HOSPADM

## 2025-04-18 RX ORDER — POLYETHYLENE GLYCOL 3350 17 G/17G
17 POWDER, FOR SOLUTION ORAL 2 TIMES DAILY
Status: DISCONTINUED | OUTPATIENT
Start: 2025-04-18 | End: 2025-04-24 | Stop reason: HOSPADM

## 2025-04-18 RX ORDER — SENNA AND DOCUSATE SODIUM 50; 8.6 MG/1; MG/1
1 TABLET, FILM COATED ORAL 2 TIMES DAILY
Status: DISCONTINUED | OUTPATIENT
Start: 2025-04-18 | End: 2025-04-24 | Stop reason: HOSPADM

## 2025-04-18 RX ORDER — INSULIN LISPRO 100 [IU]/ML
0-6 INJECTION, SOLUTION INTRAVENOUS; SUBCUTANEOUS
Status: DISCONTINUED | OUTPATIENT
Start: 2025-04-18 | End: 2025-04-24 | Stop reason: HOSPADM

## 2025-04-18 RX ORDER — ENOXAPARIN SODIUM 100 MG/ML
40 INJECTION SUBCUTANEOUS DAILY
Status: DISCONTINUED | OUTPATIENT
Start: 2025-04-18 | End: 2025-04-24 | Stop reason: HOSPADM

## 2025-04-18 RX ADMIN — WATER 2000 MG: 1 INJECTION INTRAMUSCULAR; INTRAVENOUS; SUBCUTANEOUS at 03:12

## 2025-04-18 RX ADMIN — OXYCODONE HYDROCHLORIDE 10 MG: 10 TABLET ORAL at 01:48

## 2025-04-18 RX ADMIN — OXYCODONE HYDROCHLORIDE 10 MG: 10 TABLET ORAL at 08:03

## 2025-04-18 RX ADMIN — Medication 500 MG: at 21:38

## 2025-04-18 RX ADMIN — IPRATROPIUM BROMIDE AND ALBUTEROL SULFATE 1 DOSE: 2.5; .5 SOLUTION RESPIRATORY (INHALATION) at 19:53

## 2025-04-18 RX ADMIN — POLYETHYLENE GLYCOL 3350 17 G: 17 POWDER, FOR SOLUTION ORAL at 07:57

## 2025-04-18 RX ADMIN — OXYCODONE HYDROCHLORIDE 10 MG: 10 TABLET ORAL at 14:18

## 2025-04-18 RX ADMIN — INSULIN GLARGINE 11 UNITS: 100 INJECTION, SOLUTION SUBCUTANEOUS at 21:38

## 2025-04-18 RX ADMIN — INSULIN LISPRO 4 UNITS: 100 INJECTION, SOLUTION INTRAVENOUS; SUBCUTANEOUS at 14:26

## 2025-04-18 RX ADMIN — OXYCODONE HYDROCHLORIDE 10 MG: 10 TABLET ORAL at 20:44

## 2025-04-18 RX ADMIN — ACETAMINOPHEN 1000 MG: 500 TABLET ORAL at 06:02

## 2025-04-18 RX ADMIN — SODIUM CHLORIDE, PRESERVATIVE FREE 10 ML: 5 INJECTION INTRAVENOUS at 21:39

## 2025-04-18 RX ADMIN — Medication 400 MG: at 07:56

## 2025-04-18 RX ADMIN — BISACODYL 5 MG: 5 TABLET, COATED ORAL at 07:56

## 2025-04-18 RX ADMIN — MAGNESIUM HYDROXIDE 30 ML: 400 SUSPENSION ORAL at 21:35

## 2025-04-18 RX ADMIN — CHLORHEXIDINE GLUCONATE, 0.12% ORAL RINSE 15 ML: 1.2 SOLUTION DENTAL at 07:56

## 2025-04-18 RX ADMIN — IPRATROPIUM BROMIDE AND ALBUTEROL SULFATE 1 DOSE: 2.5; .5 SOLUTION RESPIRATORY (INHALATION) at 16:50

## 2025-04-18 RX ADMIN — ACETAMINOPHEN 1000 MG: 500 TABLET ORAL at 00:00

## 2025-04-18 RX ADMIN — KETOROLAC TROMETHAMINE 15 MG: 30 INJECTION, SOLUTION INTRAMUSCULAR at 10:20

## 2025-04-18 RX ADMIN — POLYETHYLENE GLYCOL 3350 17 G: 17 POWDER, FOR SOLUTION ORAL at 21:35

## 2025-04-18 RX ADMIN — WATER 2000 MG: 1 INJECTION INTRAMUSCULAR; INTRAVENOUS; SUBCUTANEOUS at 18:52

## 2025-04-18 RX ADMIN — KETOROLAC TROMETHAMINE 15 MG: 30 INJECTION, SOLUTION INTRAMUSCULAR at 21:39

## 2025-04-18 RX ADMIN — PANTOPRAZOLE SODIUM 40 MG: 40 TABLET, DELAYED RELEASE ORAL at 06:03

## 2025-04-18 RX ADMIN — ENOXAPARIN SODIUM 40 MG: 100 INJECTION SUBCUTANEOUS at 10:45

## 2025-04-18 RX ADMIN — IPRATROPIUM BROMIDE AND ALBUTEROL SULFATE 1 DOSE: 2.5; .5 SOLUTION RESPIRATORY (INHALATION) at 14:30

## 2025-04-18 RX ADMIN — MUPIROCIN: 20 OINTMENT TOPICAL at 21:39

## 2025-04-18 RX ADMIN — SENNOSIDES AND DOCUSATE SODIUM 1 TABLET: 50; 8.6 TABLET ORAL at 21:37

## 2025-04-18 RX ADMIN — IPRATROPIUM BROMIDE AND ALBUTEROL SULFATE 1 DOSE: 2.5; .5 SOLUTION RESPIRATORY (INHALATION) at 10:01

## 2025-04-18 RX ADMIN — WATER 2000 MG: 1 INJECTION INTRAMUSCULAR; INTRAVENOUS; SUBCUTANEOUS at 10:20

## 2025-04-18 RX ADMIN — ASPIRIN 81 MG: 81 TABLET, COATED ORAL at 07:56

## 2025-04-18 RX ADMIN — Medication 400 MG: at 21:38

## 2025-04-18 RX ADMIN — FOLIC ACID 1 MG: 1 TABLET ORAL at 21:37

## 2025-04-18 RX ADMIN — INSULIN LISPRO 1 UNITS: 100 INJECTION, SOLUTION INTRAVENOUS; SUBCUTANEOUS at 21:38

## 2025-04-18 RX ADMIN — ACETAMINOPHEN 1000 MG: 500 TABLET ORAL at 18:52

## 2025-04-18 RX ADMIN — TAMSULOSIN HYDROCHLORIDE 0.4 MG: 0.4 CAPSULE ORAL at 07:56

## 2025-04-18 RX ADMIN — MUPIROCIN: 20 OINTMENT TOPICAL at 07:56

## 2025-04-18 RX ADMIN — SENNOSIDES AND DOCUSATE SODIUM 1 TABLET: 50; 8.6 TABLET ORAL at 07:56

## 2025-04-18 RX ADMIN — ALBUMIN (HUMAN) 12.5 G: 12.5 INJECTION, SOLUTION INTRAVENOUS at 10:02

## 2025-04-18 RX ADMIN — CLOPIDOGREL BISULFATE 75 MG: 75 TABLET, FILM COATED ORAL at 07:56

## 2025-04-18 RX ADMIN — ACETAMINOPHEN 1000 MG: 500 TABLET ORAL at 12:10

## 2025-04-18 RX ADMIN — SODIUM CHLORIDE, PRESERVATIVE FREE 10 ML: 5 INJECTION INTRAVENOUS at 07:56

## 2025-04-18 RX ADMIN — COLLAGENASE SANTYL: 250 OINTMENT TOPICAL at 07:56

## 2025-04-18 RX ADMIN — INSULIN LISPRO 4 UNITS: 100 INJECTION, SOLUTION INTRAVENOUS; SUBCUTANEOUS at 11:01

## 2025-04-18 RX ADMIN — KETOROLAC TROMETHAMINE 15 MG: 30 INJECTION, SOLUTION INTRAMUSCULAR at 17:19

## 2025-04-18 RX ADMIN — ALBUMIN (HUMAN) 12.5 G: 12.5 INJECTION, SOLUTION INTRAVENOUS at 03:11

## 2025-04-18 RX ADMIN — ATORVASTATIN CALCIUM 40 MG: 40 TABLET, FILM COATED ORAL at 21:38

## 2025-04-18 RX ADMIN — KETOROLAC TROMETHAMINE 15 MG: 30 INJECTION, SOLUTION INTRAMUSCULAR at 06:03

## 2025-04-18 ASSESSMENT — PAIN SCALES - GENERAL
PAINLEVEL_OUTOF10: 8
PAINLEVEL_OUTOF10: 4
PAINLEVEL_OUTOF10: 8
PAINLEVEL_OUTOF10: 5
PAINLEVEL_OUTOF10: 8
PAINLEVEL_OUTOF10: 4
PAINLEVEL_OUTOF10: 7
PAINLEVEL_OUTOF10: 6

## 2025-04-18 ASSESSMENT — PAIN DESCRIPTION - ONSET
ONSET: ON-GOING

## 2025-04-18 ASSESSMENT — PAIN DESCRIPTION - DESCRIPTORS
DESCRIPTORS: ACHING;DISCOMFORT
DESCRIPTORS: ACHING;DISCOMFORT;SORE
DESCRIPTORS: DISCOMFORT;ACHING;SORE
DESCRIPTORS: ACHING;DISCOMFORT
DESCRIPTORS: ACHING;DULL;DISCOMFORT;SORE
DESCRIPTORS: ACHING;DISCOMFORT
DESCRIPTORS: ACHING;DISCOMFORT;SORE;TENDER
DESCRIPTORS: ACHING;DISCOMFORT

## 2025-04-18 ASSESSMENT — PAIN DESCRIPTION - FREQUENCY
FREQUENCY: CONTINUOUS

## 2025-04-18 ASSESSMENT — PAIN DESCRIPTION - PAIN TYPE
TYPE: SURGICAL PAIN

## 2025-04-18 ASSESSMENT — PAIN DESCRIPTION - LOCATION
LOCATION: BACK;KNEE;CHEST
LOCATION: BACK
LOCATION: CHEST
LOCATION: CHEST
LOCATION: CHEST;INCISION;STERNUM
LOCATION: CHEST

## 2025-04-18 ASSESSMENT — PAIN DESCRIPTION - ORIENTATION
ORIENTATION: MID

## 2025-04-18 ASSESSMENT — PAIN - FUNCTIONAL ASSESSMENT
PAIN_FUNCTIONAL_ASSESSMENT: PREVENTS OR INTERFERES SOME ACTIVE ACTIVITIES AND ADLS
PAIN_FUNCTIONAL_ASSESSMENT: ACTIVITIES ARE NOT PREVENTED
PAIN_FUNCTIONAL_ASSESSMENT: PREVENTS OR INTERFERES SOME ACTIVE ACTIVITIES AND ADLS

## 2025-04-18 ASSESSMENT — PAIN SCALES - WONG BAKER: WONGBAKER_NUMERICALRESPONSE: NO HURT

## 2025-04-18 NOTE — ACP (ADVANCE CARE PLANNING)
Advance Care Planning   Healthcare Decision Maker:    Primary Decision Maker: DamiánYimi - Brother/Sister - [phone]    Secondary Decision Maker: miguelina julian - Zulma - [phone]    Click here to complete Healthcare Decision Makers including selection of the Healthcare Decision Maker Relationship (ie \"Primary\").

## 2025-04-18 NOTE — CARE COORDINATION
4/18 Care Coordination: Pt in [facility]. POD#1, CABG x 2. Extubated DOS, on 4L O2. RLE diabetic heel wound,  Podiatry following  Dressing: Santyl to foot daily, with dry sterile dressing. Pt in Isolation MRSA in foot wound.CM spoke with pt in his Room. Discussed discharge plan/needs. Pt is now living alone. Just got released fro [facility]. Went Home with [facility] for SN, PT/OT/Wound care(Foot). Discussed discharge plan. Pt states he had to leave [facility] because was out of days and can not afford co-pay. ART discussed with pt that he can not go home alone even with [facility] following. Pt is going to see if his brother will stay with him. Agrees to make referral again to [facility] but can not afford to pay. My is going to see if has any days left, she states he might of discharged before all days were used. She will get back to .ART spoke with Esperanza from [facility] they will cont to follow.   CM/SW will continue to follow for discharge planning.   Merritt LANE,RN-FLORIN-BC  [phone]     4/18 Care Coordination: ART/ABHISHEK will continue to follow for discharge planning.   Merritt LANE,RN-FLORIN-BC  [phone] My from [facility] called CM back. Pt has 30 co-pays day left. She said they can help pt apply for medicaid. ART informed her pt states he can not afford co-pays. Merritt LANE,RN-FLORIN-BC  [phone]

## 2025-04-18 NOTE — CARE COORDINATION
Case Management Assessment  Initial Evaluation    Date/Time of Evaluation: 4/18/2025 10:08 AM  Assessment Completed by: Merritt Galdamez RN    If patient is discharged prior to next notation, then this note serves as note for discharge by case management.    Patient Name: Jerome Steel                   YOB: 1960  Diagnosis: Coronary artery disease involving native coronary artery of native heart without angina pectoris [I25.10]  Coronary artery disease, unspecified vessel or lesion type, unspecified whether angina present, unspecified whether native or transplanted heart [I25.10]                   Date / Time: 4/17/2025  4:54 AM    Patient Admission Status: Inpatient   Readmission Risk (Low < 19, Mod (19-27), High > 27): Readmission Risk Score: 27.7    Current PCP: Genet Kraus, DO  PCP verified by CM? Yes    Chart Reviewed: Yes      History Provided by: Patient  Patient Orientation: Alert and Oriented    Patient Cognition: Alert    Hospitalization in the last 30 days (Readmission):  Yes    If yes, Readmission Assessment in  Navigator will be completed.    Advance Directives:      Code Status: Full Code   Patient's Primary Decision Maker is: Legal Next of Kin    Primary Decision Maker: Yimi Steel - Brother/Sister - [phone]    Secondary Decision Maker: miguelina julian - Zulma - [phone]    Discharge Planning:    Patient lives with: Alone Type of Home: House  Primary Care Giver: Self  Patient Support Systems include: Family Members, Friends/Neighbors   Current Financial resources:    Current community resources:    Current services prior to admission: None            Current DME:              Type of Home Care services:  None    ADLS  Prior functional level: Assistance with the following:, Mobility, Other (see comment) (Foot Wound)  Current functional level: Assistance with the following:, Bathing, Dressing, Toileting, Feeding, Cooking, Housework, Shopping, Mobility, Other (see comment)

## 2025-04-19 ENCOUNTER — APPOINTMENT (OUTPATIENT)
Dept: GENERAL RADIOLOGY | Age: 65
DRG: 235 | End: 2025-04-19
Attending: THORACIC SURGERY (CARDIOTHORACIC VASCULAR SURGERY)
Payer: COMMERCIAL

## 2025-04-19 PROBLEM — E44.0 MODERATE PROTEIN-ENERGY MALNUTRITION: Chronic | Status: ACTIVE | Noted: 2025-04-19

## 2025-04-19 PROBLEM — E44.0 MODERATE PROTEIN-ENERGY MALNUTRITION: Status: ACTIVE | Noted: 2025-02-18

## 2025-04-19 LAB
ALBUMIN SERPL-MCNC: 3.3 G/DL (ref 3.5–5.2)
ALP SERPL-CCNC: 71 U/L (ref 40–129)
ALT SERPL-CCNC: <5 U/L (ref 0–50)
ANION GAP SERPL CALCULATED.3IONS-SCNC: 11 MMOL/L (ref 7–16)
AST SERPL-CCNC: 22 U/L (ref 0–50)
BILIRUB SERPL-MCNC: 0.3 MG/DL (ref 0–1.2)
BUN SERPL-MCNC: 27 MG/DL (ref 8–23)
CALCIUM SERPL-MCNC: 8.7 MG/DL (ref 8.8–10.2)
CHLORIDE SERPL-SCNC: 104 MMOL/L (ref 98–107)
CO2 SERPL-SCNC: 22 MMOL/L (ref 22–29)
CREAT SERPL-MCNC: 0.6 MG/DL (ref 0.7–1.2)
ERYTHROCYTE [DISTWIDTH] IN BLOOD BY AUTOMATED COUNT: 18 % (ref 11.5–15)
GFR, ESTIMATED: >90 ML/MIN/1.73M2
GLUCOSE BLD-MCNC: 105 MG/DL (ref 74–99)
GLUCOSE BLD-MCNC: 109 MG/DL (ref 74–99)
GLUCOSE BLD-MCNC: 113 MG/DL (ref 74–99)
GLUCOSE BLD-MCNC: 124 MG/DL (ref 74–99)
GLUCOSE SERPL-MCNC: 100 MG/DL (ref 74–99)
HCT VFR BLD AUTO: 24.7 % (ref 37–54)
HGB BLD-MCNC: 7.8 G/DL (ref 12.5–16.5)
MAGNESIUM SERPL-MCNC: 2.2 MG/DL (ref 1.6–2.4)
MCH RBC QN AUTO: 29.1 PG (ref 26–35)
MCHC RBC AUTO-ENTMCNC: 31.6 G/DL (ref 32–34.5)
MCV RBC AUTO: 92.2 FL (ref 80–99.9)
PLATELET # BLD AUTO: 190 K/UL (ref 130–450)
PMV BLD AUTO: 9.8 FL (ref 7–12)
POTASSIUM SERPL-SCNC: 4.2 MMOL/L (ref 3.5–5.1)
PROT SERPL-MCNC: 5.8 G/DL (ref 6.4–8.3)
RBC # BLD AUTO: 2.68 M/UL (ref 3.8–5.8)
SODIUM SERPL-SCNC: 136 MMOL/L (ref 136–145)
WBC OTHER # BLD: 10 K/UL (ref 4.5–11.5)

## 2025-04-19 PROCEDURE — 6370000000 HC RX 637 (ALT 250 FOR IP): Performed by: NURSE PRACTITIONER

## 2025-04-19 PROCEDURE — 83735 ASSAY OF MAGNESIUM: CPT

## 2025-04-19 PROCEDURE — 71045 X-RAY EXAM CHEST 1 VIEW: CPT

## 2025-04-19 PROCEDURE — 82962 GLUCOSE BLOOD TEST: CPT

## 2025-04-19 PROCEDURE — 99232 SBSQ HOSP IP/OBS MODERATE 35: CPT | Performed by: INTERNAL MEDICINE

## 2025-04-19 PROCEDURE — 36415 COLL VENOUS BLD VENIPUNCTURE: CPT

## 2025-04-19 PROCEDURE — 2140000000 HC CCU INTERMEDIATE R&B

## 2025-04-19 PROCEDURE — 94669 MECHANICAL CHEST WALL OSCILL: CPT

## 2025-04-19 PROCEDURE — 6370000000 HC RX 637 (ALT 250 FOR IP): Performed by: PHYSICIAN ASSISTANT

## 2025-04-19 PROCEDURE — 6360000002 HC RX W HCPCS: Performed by: PHYSICIAN ASSISTANT

## 2025-04-19 PROCEDURE — 6360000002 HC RX W HCPCS: Performed by: NURSE PRACTITIONER

## 2025-04-19 PROCEDURE — 94640 AIRWAY INHALATION TREATMENT: CPT

## 2025-04-19 PROCEDURE — 85027 COMPLETE CBC AUTOMATED: CPT

## 2025-04-19 PROCEDURE — 80053 COMPREHEN METABOLIC PANEL: CPT

## 2025-04-19 PROCEDURE — 2500000003 HC RX 250 WO HCPCS: Performed by: NURSE PRACTITIONER

## 2025-04-19 PROCEDURE — 6370000000 HC RX 637 (ALT 250 FOR IP): Performed by: INTERNAL MEDICINE

## 2025-04-19 PROCEDURE — 93798 PHYS/QHP OP CAR RHAB W/ECG: CPT

## 2025-04-19 RX ORDER — FUROSEMIDE 10 MG/ML
20 INJECTION INTRAMUSCULAR; INTRAVENOUS ONCE
Status: COMPLETED | OUTPATIENT
Start: 2025-04-19 | End: 2025-04-19

## 2025-04-19 RX ORDER — INSULIN GLARGINE 100 [IU]/ML
8 INJECTION, SOLUTION SUBCUTANEOUS NIGHTLY
Status: DISCONTINUED | OUTPATIENT
Start: 2025-04-19 | End: 2025-04-20

## 2025-04-19 RX ORDER — CARVEDILOL 3.12 MG/1
3.12 TABLET ORAL 2 TIMES DAILY WITH MEALS
Status: DISCONTINUED | OUTPATIENT
Start: 2025-04-19 | End: 2025-04-23

## 2025-04-19 RX ADMIN — FERROUS SULFATE TAB 325 MG (65 MG ELEMENTAL FE) 325 MG: 325 (65 FE) TAB at 08:52

## 2025-04-19 RX ADMIN — IBUPROFEN 400 MG: 400 TABLET, FILM COATED ORAL at 14:48

## 2025-04-19 RX ADMIN — ENOXAPARIN SODIUM 40 MG: 100 INJECTION SUBCUTANEOUS at 08:53

## 2025-04-19 RX ADMIN — SODIUM CHLORIDE, PRESERVATIVE FREE 10 ML: 5 INJECTION INTRAVENOUS at 08:54

## 2025-04-19 RX ADMIN — MUPIROCIN: 20 OINTMENT TOPICAL at 20:48

## 2025-04-19 RX ADMIN — BISACODYL 5 MG: 5 TABLET, COATED ORAL at 08:53

## 2025-04-19 RX ADMIN — INSULIN LISPRO 2 UNITS: 100 INJECTION, SOLUTION INTRAVENOUS; SUBCUTANEOUS at 09:07

## 2025-04-19 RX ADMIN — INSULIN GLARGINE 8 UNITS: 100 INJECTION, SOLUTION SUBCUTANEOUS at 20:46

## 2025-04-19 RX ADMIN — IBUPROFEN 400 MG: 400 TABLET, FILM COATED ORAL at 08:53

## 2025-04-19 RX ADMIN — IPRATROPIUM BROMIDE AND ALBUTEROL SULFATE 1 DOSE: 2.5; .5 SOLUTION RESPIRATORY (INHALATION) at 09:03

## 2025-04-19 RX ADMIN — ACETAMINOPHEN 1000 MG: 500 TABLET ORAL at 06:42

## 2025-04-19 RX ADMIN — CARVEDILOL 3.12 MG: 3.12 TABLET, FILM COATED ORAL at 16:31

## 2025-04-19 RX ADMIN — ASPIRIN 81 MG: 81 TABLET, COATED ORAL at 08:52

## 2025-04-19 RX ADMIN — Medication 400 MG: at 20:44

## 2025-04-19 RX ADMIN — Medication 500 MG: at 20:44

## 2025-04-19 RX ADMIN — FOLIC ACID 1 MG: 1 TABLET ORAL at 08:53

## 2025-04-19 RX ADMIN — MUPIROCIN: 20 OINTMENT TOPICAL at 08:54

## 2025-04-19 RX ADMIN — Medication 500 MG: at 08:52

## 2025-04-19 RX ADMIN — SENNOSIDES AND DOCUSATE SODIUM 1 TABLET: 50; 8.6 TABLET ORAL at 08:52

## 2025-04-19 RX ADMIN — INSULIN LISPRO 2 UNITS: 100 INJECTION, SOLUTION INTRAVENOUS; SUBCUTANEOUS at 11:46

## 2025-04-19 RX ADMIN — OXYCODONE HYDROCHLORIDE 10 MG: 10 TABLET ORAL at 10:31

## 2025-04-19 RX ADMIN — IPRATROPIUM BROMIDE AND ALBUTEROL SULFATE 1 DOSE: 2.5; .5 SOLUTION RESPIRATORY (INHALATION) at 20:53

## 2025-04-19 RX ADMIN — FERROUS SULFATE TAB 325 MG (65 MG ELEMENTAL FE) 325 MG: 325 (65 FE) TAB at 16:31

## 2025-04-19 RX ADMIN — PANTOPRAZOLE SODIUM 40 MG: 40 TABLET, DELAYED RELEASE ORAL at 06:42

## 2025-04-19 RX ADMIN — IPRATROPIUM BROMIDE AND ALBUTEROL SULFATE 1 DOSE: 2.5; .5 SOLUTION RESPIRATORY (INHALATION) at 12:58

## 2025-04-19 RX ADMIN — ATORVASTATIN CALCIUM 40 MG: 40 TABLET, FILM COATED ORAL at 20:44

## 2025-04-19 RX ADMIN — FUROSEMIDE 20 MG: 10 INJECTION, SOLUTION INTRAMUSCULAR; INTRAVENOUS at 14:48

## 2025-04-19 RX ADMIN — CARVEDILOL 3.12 MG: 3.12 TABLET, FILM COATED ORAL at 08:53

## 2025-04-19 RX ADMIN — CLOPIDOGREL BISULFATE 75 MG: 75 TABLET, FILM COATED ORAL at 08:52

## 2025-04-19 RX ADMIN — POLYETHYLENE GLYCOL 3350 17 G: 17 POWDER, FOR SOLUTION ORAL at 08:54

## 2025-04-19 RX ADMIN — ACETAMINOPHEN 1000 MG: 500 TABLET ORAL at 01:18

## 2025-04-19 RX ADMIN — MAGNESIUM HYDROXIDE 30 ML: 400 SUSPENSION ORAL at 08:53

## 2025-04-19 RX ADMIN — INSULIN LISPRO 2 UNITS: 100 INJECTION, SOLUTION INTRAVENOUS; SUBCUTANEOUS at 16:32

## 2025-04-19 RX ADMIN — OXYCODONE HYDROCHLORIDE 5 MG: 5 TABLET ORAL at 16:31

## 2025-04-19 RX ADMIN — OXYCODONE HYDROCHLORIDE 10 MG: 10 TABLET ORAL at 20:44

## 2025-04-19 RX ADMIN — Medication 400 MG: at 09:03

## 2025-04-19 RX ADMIN — ACETAMINOPHEN 650 MG: 325 TABLET ORAL at 20:44

## 2025-04-19 RX ADMIN — COLLAGENASE SANTYL: 250 OINTMENT TOPICAL at 08:54

## 2025-04-19 RX ADMIN — OXYCODONE HYDROCHLORIDE 10 MG: 10 TABLET ORAL at 01:18

## 2025-04-19 RX ADMIN — SENNOSIDES AND DOCUSATE SODIUM 1 TABLET: 50; 8.6 TABLET ORAL at 20:45

## 2025-04-19 RX ADMIN — WATER 2000 MG: 1 INJECTION INTRAMUSCULAR; INTRAVENOUS; SUBCUTANEOUS at 01:18

## 2025-04-19 RX ADMIN — LACTULOSE 20 G: 20 SOLUTION ORAL at 08:53

## 2025-04-19 RX ADMIN — SODIUM CHLORIDE, PRESERVATIVE FREE 10 ML: 5 INJECTION INTRAVENOUS at 20:45

## 2025-04-19 ASSESSMENT — PAIN SCALES - GENERAL
PAINLEVEL_OUTOF10: 4
PAINLEVEL_OUTOF10: 6
PAINLEVEL_OUTOF10: 6
PAINLEVEL_OUTOF10: 0
PAINLEVEL_OUTOF10: 8
PAINLEVEL_OUTOF10: 0
PAINLEVEL_OUTOF10: 10
PAINLEVEL_OUTOF10: 8
PAINLEVEL_OUTOF10: 8
PAINLEVEL_OUTOF10: 2

## 2025-04-19 ASSESSMENT — PAIN DESCRIPTION - FREQUENCY: FREQUENCY: CONTINUOUS

## 2025-04-19 ASSESSMENT — PAIN DESCRIPTION - ORIENTATION
ORIENTATION: RIGHT
ORIENTATION: RIGHT;LEFT
ORIENTATION: RIGHT

## 2025-04-19 ASSESSMENT — PAIN DESCRIPTION - DESCRIPTORS
DESCRIPTORS: ACHING;DISCOMFORT;SORE;THROBBING
DESCRIPTORS: ACHING;DISCOMFORT;SORE

## 2025-04-19 ASSESSMENT — PAIN DESCRIPTION - LOCATION
LOCATION: BACK;CHEST
LOCATION: BACK;FOOT
LOCATION: FOOT
LOCATION: RIB CAGE
LOCATION: BACK;CHEST

## 2025-04-19 ASSESSMENT — PAIN DESCRIPTION - ONSET: ONSET: ON-GOING

## 2025-04-19 ASSESSMENT — PAIN DESCRIPTION - PAIN TYPE: TYPE: SURGICAL PAIN

## 2025-04-20 LAB
ALBUMIN SERPL-MCNC: 3.2 G/DL (ref 3.5–5.2)
ALP SERPL-CCNC: 100 U/L (ref 40–129)
ALT SERPL-CCNC: 6 U/L (ref 0–50)
ANION GAP SERPL CALCULATED.3IONS-SCNC: 10 MMOL/L (ref 7–16)
AST SERPL-CCNC: 23 U/L (ref 0–50)
BILIRUB SERPL-MCNC: 0.5 MG/DL (ref 0–1.2)
BUN SERPL-MCNC: 24 MG/DL (ref 8–23)
CALCIUM SERPL-MCNC: 8.8 MG/DL (ref 8.8–10.2)
CHLORIDE SERPL-SCNC: 102 MMOL/L (ref 98–107)
CO2 SERPL-SCNC: 24 MMOL/L (ref 22–29)
CREAT SERPL-MCNC: 0.6 MG/DL (ref 0.7–1.2)
ERYTHROCYTE [DISTWIDTH] IN BLOOD BY AUTOMATED COUNT: 18.2 % (ref 11.5–15)
GFR, ESTIMATED: >90 ML/MIN/1.73M2
GLUCOSE BLD-MCNC: 107 MG/DL (ref 74–99)
GLUCOSE BLD-MCNC: 128 MG/DL (ref 74–99)
GLUCOSE BLD-MCNC: 181 MG/DL (ref 74–99)
GLUCOSE BLD-MCNC: 89 MG/DL (ref 74–99)
GLUCOSE SERPL-MCNC: 89 MG/DL (ref 74–99)
HCT VFR BLD AUTO: 29.6 % (ref 37–54)
HGB BLD-MCNC: 9.4 G/DL (ref 12.5–16.5)
MAGNESIUM SERPL-MCNC: 2.1 MG/DL (ref 1.6–2.4)
MCH RBC QN AUTO: 29 PG (ref 26–35)
MCHC RBC AUTO-ENTMCNC: 31.8 G/DL (ref 32–34.5)
MCV RBC AUTO: 91.4 FL (ref 80–99.9)
PLATELET # BLD AUTO: 270 K/UL (ref 130–450)
PMV BLD AUTO: 9.3 FL (ref 7–12)
POTASSIUM SERPL-SCNC: 4.2 MMOL/L (ref 3.5–5.1)
PROT SERPL-MCNC: 6.1 G/DL (ref 6.4–8.3)
RBC # BLD AUTO: 3.24 M/UL (ref 3.8–5.8)
SODIUM SERPL-SCNC: 136 MMOL/L (ref 136–145)
WBC OTHER # BLD: 10.2 K/UL (ref 4.5–11.5)

## 2025-04-20 PROCEDURE — 6370000000 HC RX 637 (ALT 250 FOR IP): Performed by: NURSE PRACTITIONER

## 2025-04-20 PROCEDURE — 36415 COLL VENOUS BLD VENIPUNCTURE: CPT

## 2025-04-20 PROCEDURE — 82962 GLUCOSE BLOOD TEST: CPT

## 2025-04-20 PROCEDURE — 83735 ASSAY OF MAGNESIUM: CPT

## 2025-04-20 PROCEDURE — 80053 COMPREHEN METABOLIC PANEL: CPT

## 2025-04-20 PROCEDURE — 99232 SBSQ HOSP IP/OBS MODERATE 35: CPT | Performed by: INTERNAL MEDICINE

## 2025-04-20 PROCEDURE — 93798 PHYS/QHP OP CAR RHAB W/ECG: CPT

## 2025-04-20 PROCEDURE — 2500000003 HC RX 250 WO HCPCS: Performed by: NURSE PRACTITIONER

## 2025-04-20 PROCEDURE — 2140000000 HC CCU INTERMEDIATE R&B

## 2025-04-20 PROCEDURE — 94640 AIRWAY INHALATION TREATMENT: CPT

## 2025-04-20 PROCEDURE — 85027 COMPLETE CBC AUTOMATED: CPT

## 2025-04-20 PROCEDURE — 6370000000 HC RX 637 (ALT 250 FOR IP): Performed by: PHYSICIAN ASSISTANT

## 2025-04-20 PROCEDURE — 6370000000 HC RX 637 (ALT 250 FOR IP): Performed by: INTERNAL MEDICINE

## 2025-04-20 PROCEDURE — 94669 MECHANICAL CHEST WALL OSCILL: CPT

## 2025-04-20 PROCEDURE — 6360000002 HC RX W HCPCS: Performed by: NURSE PRACTITIONER

## 2025-04-20 RX ORDER — INSULIN GLARGINE 100 [IU]/ML
5 INJECTION, SOLUTION SUBCUTANEOUS NIGHTLY
Status: DISCONTINUED | OUTPATIENT
Start: 2025-04-20 | End: 2025-04-24 | Stop reason: HOSPADM

## 2025-04-20 RX ADMIN — Medication 400 MG: at 21:05

## 2025-04-20 RX ADMIN — OXYCODONE HYDROCHLORIDE 10 MG: 10 TABLET ORAL at 12:14

## 2025-04-20 RX ADMIN — IPRATROPIUM BROMIDE AND ALBUTEROL SULFATE 1 DOSE: 2.5; .5 SOLUTION RESPIRATORY (INHALATION) at 09:41

## 2025-04-20 RX ADMIN — MAGNESIUM SULFATE HEPTAHYDRATE 2000 MG: 40 INJECTION, SOLUTION INTRAVENOUS at 09:38

## 2025-04-20 RX ADMIN — CLOPIDOGREL BISULFATE 75 MG: 75 TABLET, FILM COATED ORAL at 09:20

## 2025-04-20 RX ADMIN — IPRATROPIUM BROMIDE AND ALBUTEROL SULFATE 1 DOSE: 2.5; .5 SOLUTION RESPIRATORY (INHALATION) at 12:59

## 2025-04-20 RX ADMIN — SODIUM CHLORIDE, PRESERVATIVE FREE 10 ML: 5 INJECTION INTRAVENOUS at 21:05

## 2025-04-20 RX ADMIN — FERROUS SULFATE TAB 325 MG (65 MG ELEMENTAL FE) 325 MG: 325 (65 FE) TAB at 16:43

## 2025-04-20 RX ADMIN — Medication 500 MG: at 21:05

## 2025-04-20 RX ADMIN — FOLIC ACID 1 MG: 1 TABLET ORAL at 09:20

## 2025-04-20 RX ADMIN — SODIUM CHLORIDE, PRESERVATIVE FREE 10 ML: 5 INJECTION INTRAVENOUS at 09:20

## 2025-04-20 RX ADMIN — FERROUS SULFATE TAB 325 MG (65 MG ELEMENTAL FE) 325 MG: 325 (65 FE) TAB at 09:20

## 2025-04-20 RX ADMIN — OXYCODONE HYDROCHLORIDE 10 MG: 10 TABLET ORAL at 21:05

## 2025-04-20 RX ADMIN — MUPIROCIN: 20 OINTMENT TOPICAL at 21:05

## 2025-04-20 RX ADMIN — IPRATROPIUM BROMIDE AND ALBUTEROL SULFATE 1 DOSE: 2.5; .5 SOLUTION RESPIRATORY (INHALATION) at 16:29

## 2025-04-20 RX ADMIN — Medication 400 MG: at 09:20

## 2025-04-20 RX ADMIN — PANTOPRAZOLE SODIUM 40 MG: 40 TABLET, DELAYED RELEASE ORAL at 05:39

## 2025-04-20 RX ADMIN — OXYCODONE HYDROCHLORIDE 10 MG: 10 TABLET ORAL at 00:47

## 2025-04-20 RX ADMIN — OXYCODONE HYDROCHLORIDE 10 MG: 10 TABLET ORAL at 16:43

## 2025-04-20 RX ADMIN — INSULIN GLARGINE 5 UNITS: 100 INJECTION, SOLUTION SUBCUTANEOUS at 21:05

## 2025-04-20 RX ADMIN — CARVEDILOL 3.12 MG: 3.12 TABLET, FILM COATED ORAL at 16:44

## 2025-04-20 RX ADMIN — INSULIN LISPRO 1 UNITS: 100 INJECTION, SOLUTION INTRAVENOUS; SUBCUTANEOUS at 16:44

## 2025-04-20 RX ADMIN — ASPIRIN 81 MG: 81 TABLET, COATED ORAL at 09:20

## 2025-04-20 RX ADMIN — OXYCODONE HYDROCHLORIDE 10 MG: 10 TABLET ORAL at 05:40

## 2025-04-20 RX ADMIN — COLLAGENASE SANTYL: 250 OINTMENT TOPICAL at 09:21

## 2025-04-20 RX ADMIN — ATORVASTATIN CALCIUM 40 MG: 40 TABLET, FILM COATED ORAL at 21:05

## 2025-04-20 RX ADMIN — CARVEDILOL 3.12 MG: 3.12 TABLET, FILM COATED ORAL at 09:20

## 2025-04-20 RX ADMIN — MUPIROCIN: 20 OINTMENT TOPICAL at 09:21

## 2025-04-20 RX ADMIN — IPRATROPIUM BROMIDE AND ALBUTEROL SULFATE 1 DOSE: 2.5; .5 SOLUTION RESPIRATORY (INHALATION) at 20:30

## 2025-04-20 RX ADMIN — ENOXAPARIN SODIUM 40 MG: 100 INJECTION SUBCUTANEOUS at 09:20

## 2025-04-20 RX ADMIN — Medication 500 MG: at 09:21

## 2025-04-20 ASSESSMENT — PAIN DESCRIPTION - DESCRIPTORS
DESCRIPTORS: ACHING;DISCOMFORT;SORE

## 2025-04-20 ASSESSMENT — PAIN - FUNCTIONAL ASSESSMENT
PAIN_FUNCTIONAL_ASSESSMENT: PREVENTS OR INTERFERES SOME ACTIVE ACTIVITIES AND ADLS
PAIN_FUNCTIONAL_ASSESSMENT: ACTIVITIES ARE NOT PREVENTED
PAIN_FUNCTIONAL_ASSESSMENT: ACTIVITIES ARE NOT PREVENTED
PAIN_FUNCTIONAL_ASSESSMENT: PREVENTS OR INTERFERES SOME ACTIVE ACTIVITIES AND ADLS
PAIN_FUNCTIONAL_ASSESSMENT: ACTIVITIES ARE NOT PREVENTED

## 2025-04-20 ASSESSMENT — PAIN DESCRIPTION - ORIENTATION
ORIENTATION: RIGHT;LEFT;MID;LOWER
ORIENTATION: RIGHT;LEFT
ORIENTATION: RIGHT;LEFT;MID;LOWER
ORIENTATION: LEFT;RIGHT
ORIENTATION: LEFT;RIGHT

## 2025-04-20 ASSESSMENT — PAIN DESCRIPTION - LOCATION
LOCATION: RIB CAGE
LOCATION: RIB CAGE
LOCATION: BACK;RIB CAGE;SHOULDER
LOCATION: RIB CAGE

## 2025-04-20 ASSESSMENT — PAIN DESCRIPTION - FREQUENCY
FREQUENCY: CONTINUOUS

## 2025-04-20 ASSESSMENT — PAIN DESCRIPTION - ONSET
ONSET: ON-GOING

## 2025-04-20 ASSESSMENT — PAIN DESCRIPTION - PAIN TYPE
TYPE: SURGICAL PAIN

## 2025-04-20 ASSESSMENT — PAIN SCALES - GENERAL
PAINLEVEL_OUTOF10: 10
PAINLEVEL_OUTOF10: 10
PAINLEVEL_OUTOF10: 4
PAINLEVEL_OUTOF10: 7
PAINLEVEL_OUTOF10: 3
PAINLEVEL_OUTOF10: 9
PAINLEVEL_OUTOF10: 3
PAINLEVEL_OUTOF10: 6
PAINLEVEL_OUTOF10: 2

## 2025-04-21 LAB
ABO/RH: NORMAL
ALBUMIN SERPL-MCNC: 3.1 G/DL (ref 3.5–5.2)
ALP SERPL-CCNC: 105 U/L (ref 40–129)
ALT SERPL-CCNC: 5 U/L (ref 0–50)
ANION GAP SERPL CALCULATED.3IONS-SCNC: 12 MMOL/L (ref 7–16)
ANTIBODY SCREEN: NEGATIVE
ARM BAND NUMBER: NORMAL
AST SERPL-CCNC: 19 U/L (ref 0–50)
BILIRUB SERPL-MCNC: 0.6 MG/DL (ref 0–1.2)
BLOOD BANK DISPENSE STATUS: NORMAL
BLOOD BANK SAMPLE EXPIRATION: NORMAL
BPU ID: NORMAL
BUN SERPL-MCNC: 16 MG/DL (ref 8–23)
CALCIUM SERPL-MCNC: 9 MG/DL (ref 8.8–10.2)
CHLORIDE SERPL-SCNC: 100 MMOL/L (ref 98–107)
CO2 SERPL-SCNC: 22 MMOL/L (ref 22–29)
COMPONENT: NORMAL
CREAT SERPL-MCNC: 0.6 MG/DL (ref 0.7–1.2)
CROSSMATCH RESULT: NORMAL
ERYTHROCYTE [DISTWIDTH] IN BLOOD BY AUTOMATED COUNT: 17.8 % (ref 11.5–15)
GFR, ESTIMATED: >90 ML/MIN/1.73M2
GLUCOSE BLD-MCNC: 104 MG/DL (ref 74–99)
GLUCOSE BLD-MCNC: 119 MG/DL (ref 74–99)
GLUCOSE BLD-MCNC: 147 MG/DL (ref 74–99)
GLUCOSE BLD-MCNC: 155 MG/DL (ref 74–99)
GLUCOSE SERPL-MCNC: 106 MG/DL (ref 74–99)
HCT VFR BLD AUTO: 28.3 % (ref 37–54)
HGB BLD-MCNC: 9.2 G/DL (ref 12.5–16.5)
MAGNESIUM SERPL-MCNC: 2 MG/DL (ref 1.6–2.4)
MCH RBC QN AUTO: 29.1 PG (ref 26–35)
MCHC RBC AUTO-ENTMCNC: 32.5 G/DL (ref 32–34.5)
MCV RBC AUTO: 89.6 FL (ref 80–99.9)
PLATELET # BLD AUTO: 290 K/UL (ref 130–450)
PMV BLD AUTO: 9.2 FL (ref 7–12)
POTASSIUM SERPL-SCNC: 4.2 MMOL/L (ref 3.5–5.1)
PROT SERPL-MCNC: 6.1 G/DL (ref 6.4–8.3)
RBC # BLD AUTO: 3.16 M/UL (ref 3.8–5.8)
SODIUM SERPL-SCNC: 133 MMOL/L (ref 136–145)
TRANSFUSION STATUS: NORMAL
UNIT DIVISION: 0
WBC OTHER # BLD: 9.8 K/UL (ref 4.5–11.5)

## 2025-04-21 PROCEDURE — 99232 SBSQ HOSP IP/OBS MODERATE 35: CPT | Performed by: INTERNAL MEDICINE

## 2025-04-21 PROCEDURE — 6360000002 HC RX W HCPCS: Performed by: NURSE PRACTITIONER

## 2025-04-21 PROCEDURE — 2500000003 HC RX 250 WO HCPCS: Performed by: NURSE PRACTITIONER

## 2025-04-21 PROCEDURE — 6370000000 HC RX 637 (ALT 250 FOR IP): Performed by: NURSE PRACTITIONER

## 2025-04-21 PROCEDURE — 82962 GLUCOSE BLOOD TEST: CPT

## 2025-04-21 PROCEDURE — 85027 COMPLETE CBC AUTOMATED: CPT

## 2025-04-21 PROCEDURE — 97530 THERAPEUTIC ACTIVITIES: CPT

## 2025-04-21 PROCEDURE — 6370000000 HC RX 637 (ALT 250 FOR IP): Performed by: INTERNAL MEDICINE

## 2025-04-21 PROCEDURE — 83735 ASSAY OF MAGNESIUM: CPT

## 2025-04-21 PROCEDURE — 80053 COMPREHEN METABOLIC PANEL: CPT

## 2025-04-21 PROCEDURE — 2140000000 HC CCU INTERMEDIATE R&B

## 2025-04-21 PROCEDURE — 93798 PHYS/QHP OP CAR RHAB W/ECG: CPT

## 2025-04-21 PROCEDURE — 6370000000 HC RX 637 (ALT 250 FOR IP): Performed by: PHYSICIAN ASSISTANT

## 2025-04-21 PROCEDURE — 36415 COLL VENOUS BLD VENIPUNCTURE: CPT

## 2025-04-21 PROCEDURE — 97535 SELF CARE MNGMENT TRAINING: CPT

## 2025-04-21 PROCEDURE — 94640 AIRWAY INHALATION TREATMENT: CPT

## 2025-04-21 RX ADMIN — ATORVASTATIN CALCIUM 40 MG: 40 TABLET, FILM COATED ORAL at 20:34

## 2025-04-21 RX ADMIN — ENOXAPARIN SODIUM 40 MG: 100 INJECTION SUBCUTANEOUS at 10:05

## 2025-04-21 RX ADMIN — INSULIN LISPRO 1 UNITS: 100 INJECTION, SOLUTION INTRAVENOUS; SUBCUTANEOUS at 20:38

## 2025-04-21 RX ADMIN — ASPIRIN 81 MG: 81 TABLET, COATED ORAL at 10:04

## 2025-04-21 RX ADMIN — PANTOPRAZOLE SODIUM 40 MG: 40 TABLET, DELAYED RELEASE ORAL at 05:40

## 2025-04-21 RX ADMIN — FERROUS SULFATE TAB 325 MG (65 MG ELEMENTAL FE) 325 MG: 325 (65 FE) TAB at 10:04

## 2025-04-21 RX ADMIN — Medication 400 MG: at 10:05

## 2025-04-21 RX ADMIN — FERROUS SULFATE TAB 325 MG (65 MG ELEMENTAL FE) 325 MG: 325 (65 FE) TAB at 16:55

## 2025-04-21 RX ADMIN — Medication 400 MG: at 20:34

## 2025-04-21 RX ADMIN — INSULIN GLARGINE 5 UNITS: 100 INJECTION, SOLUTION SUBCUTANEOUS at 20:34

## 2025-04-21 RX ADMIN — CARVEDILOL 3.12 MG: 3.12 TABLET, FILM COATED ORAL at 16:55

## 2025-04-21 RX ADMIN — MAGNESIUM SULFATE HEPTAHYDRATE 2000 MG: 40 INJECTION, SOLUTION INTRAVENOUS at 10:03

## 2025-04-21 RX ADMIN — MUPIROCIN: 20 OINTMENT TOPICAL at 10:06

## 2025-04-21 RX ADMIN — OXYCODONE HYDROCHLORIDE 10 MG: 10 TABLET ORAL at 01:05

## 2025-04-21 RX ADMIN — OXYCODONE HYDROCHLORIDE 10 MG: 10 TABLET ORAL at 14:18

## 2025-04-21 RX ADMIN — IPRATROPIUM BROMIDE AND ALBUTEROL SULFATE 1 DOSE: 2.5; .5 SOLUTION RESPIRATORY (INHALATION) at 16:25

## 2025-04-21 RX ADMIN — SODIUM CHLORIDE, PRESERVATIVE FREE 10 ML: 5 INJECTION INTRAVENOUS at 10:05

## 2025-04-21 RX ADMIN — Medication 500 MG: at 20:34

## 2025-04-21 RX ADMIN — CLOPIDOGREL BISULFATE 75 MG: 75 TABLET, FILM COATED ORAL at 10:04

## 2025-04-21 RX ADMIN — OXYCODONE HYDROCHLORIDE 10 MG: 10 TABLET ORAL at 20:34

## 2025-04-21 RX ADMIN — IPRATROPIUM BROMIDE AND ALBUTEROL SULFATE 1 DOSE: 2.5; .5 SOLUTION RESPIRATORY (INHALATION) at 08:50

## 2025-04-21 RX ADMIN — OXYCODONE HYDROCHLORIDE 10 MG: 10 TABLET ORAL at 05:40

## 2025-04-21 RX ADMIN — OXYCODONE HYDROCHLORIDE 10 MG: 10 TABLET ORAL at 10:02

## 2025-04-21 RX ADMIN — Medication 500 MG: at 10:05

## 2025-04-21 RX ADMIN — SODIUM CHLORIDE, PRESERVATIVE FREE 10 ML: 5 INJECTION INTRAVENOUS at 20:38

## 2025-04-21 RX ADMIN — ACETAMINOPHEN 650 MG: 325 TABLET ORAL at 18:35

## 2025-04-21 RX ADMIN — FOLIC ACID 1 MG: 1 TABLET ORAL at 10:05

## 2025-04-21 RX ADMIN — COLLAGENASE SANTYL: 250 OINTMENT TOPICAL at 10:06

## 2025-04-21 RX ADMIN — IPRATROPIUM BROMIDE AND ALBUTEROL SULFATE 1 DOSE: 2.5; .5 SOLUTION RESPIRATORY (INHALATION) at 11:50

## 2025-04-21 ASSESSMENT — PAIN - FUNCTIONAL ASSESSMENT
PAIN_FUNCTIONAL_ASSESSMENT: ACTIVITIES ARE NOT PREVENTED
PAIN_FUNCTIONAL_ASSESSMENT: PREVENTS OR INTERFERES SOME ACTIVE ACTIVITIES AND ADLS
PAIN_FUNCTIONAL_ASSESSMENT: ACTIVITIES ARE NOT PREVENTED

## 2025-04-21 ASSESSMENT — PAIN DESCRIPTION - LOCATION
LOCATION: CHEST;RIB CAGE
LOCATION: RIB CAGE
LOCATION: RIB CAGE
LOCATION: INCISION
LOCATION: RIB CAGE

## 2025-04-21 ASSESSMENT — PAIN SCALES - GENERAL
PAINLEVEL_OUTOF10: 8
PAINLEVEL_OUTOF10: 7
PAINLEVEL_OUTOF10: 3
PAINLEVEL_OUTOF10: 8
PAINLEVEL_OUTOF10: 5
PAINLEVEL_OUTOF10: 2
PAINLEVEL_OUTOF10: 2
PAINLEVEL_OUTOF10: 1
PAINLEVEL_OUTOF10: 8

## 2025-04-21 ASSESSMENT — PAIN DESCRIPTION - ORIENTATION
ORIENTATION: MID
ORIENTATION: RIGHT;LEFT
ORIENTATION: RIGHT;LEFT;MID
ORIENTATION: RIGHT;LEFT
ORIENTATION: RIGHT;LEFT;MID

## 2025-04-21 ASSESSMENT — PAIN DESCRIPTION - ONSET
ONSET: ON-GOING

## 2025-04-21 ASSESSMENT — PAIN DESCRIPTION - FREQUENCY
FREQUENCY: CONTINUOUS

## 2025-04-21 ASSESSMENT — PAIN DESCRIPTION - DESCRIPTORS
DESCRIPTORS: ACHING;DISCOMFORT;SORE
DESCRIPTORS: SORE
DESCRIPTORS: ACHING;DISCOMFORT;SORE

## 2025-04-21 ASSESSMENT — PAIN DESCRIPTION - PAIN TYPE
TYPE: SURGICAL PAIN

## 2025-04-21 NOTE — CARE COORDINATION
Transition of care update: POD#4 CABG x 2. CTs x 2 and wires cont. Labs noted. Met with pt in room. Up in chair. On RA. Ambulated 2ft with cardiac rehab yesterday. Pt is hoping for jeferson at [facility].  Per prior cm, pt has 30 co-pay days left. Called Matilda with [facility] and she is coming in to discuss co-pay cost with pt. Will need pt/ot updates and a pre cert. Pt lives alone and was going to ask his brother, Tl, to stay with him after discharge if unable to afford cost of jeferson co-pay days. Pt was active with [facility] prior to admission for skilled nursing and therapies. Will have [facility] continue to follow pt. Cm/sw will follow.

## 2025-04-22 LAB
ABO + RH BLD: NORMAL
ALBUMIN SERPL-MCNC: 3.2 G/DL (ref 3.5–5.2)
ALP SERPL-CCNC: 124 U/L (ref 40–129)
ALT SERPL-CCNC: 7 U/L (ref 0–50)
ANION GAP SERPL CALCULATED.3IONS-SCNC: 11 MMOL/L (ref 7–16)
ARM BAND NUMBER: NORMAL
AST SERPL-CCNC: 23 U/L (ref 0–50)
BILIRUB SERPL-MCNC: 0.5 MG/DL (ref 0–1.2)
BLOOD BANK SAMPLE EXPIRATION: NORMAL
BLOOD GROUP ANTIBODIES SERPL: NEGATIVE
BUN SERPL-MCNC: 16 MG/DL (ref 8–23)
CALCIUM SERPL-MCNC: 9.1 MG/DL (ref 8.8–10.2)
CHLORIDE SERPL-SCNC: 99 MMOL/L (ref 98–107)
CO2 SERPL-SCNC: 23 MMOL/L (ref 22–29)
CREAT SERPL-MCNC: 0.6 MG/DL (ref 0.7–1.2)
ERYTHROCYTE [DISTWIDTH] IN BLOOD BY AUTOMATED COUNT: 17.5 % (ref 11.5–15)
GFR, ESTIMATED: >90 ML/MIN/1.73M2
GLUCOSE BLD-MCNC: 123 MG/DL (ref 74–99)
GLUCOSE BLD-MCNC: 143 MG/DL (ref 74–99)
GLUCOSE BLD-MCNC: 161 MG/DL (ref 74–99)
GLUCOSE BLD-MCNC: 182 MG/DL (ref 74–99)
GLUCOSE SERPL-MCNC: 114 MG/DL (ref 74–99)
HCT VFR BLD AUTO: 28.7 % (ref 37–54)
HGB BLD-MCNC: 9.4 G/DL (ref 12.5–16.5)
MAGNESIUM SERPL-MCNC: 2 MG/DL (ref 1.6–2.4)
MCH RBC QN AUTO: 29.5 PG (ref 26–35)
MCHC RBC AUTO-ENTMCNC: 32.8 G/DL (ref 32–34.5)
MCV RBC AUTO: 90 FL (ref 80–99.9)
PLATELET # BLD AUTO: 322 K/UL (ref 130–450)
PMV BLD AUTO: 9 FL (ref 7–12)
POTASSIUM SERPL-SCNC: 4.2 MMOL/L (ref 3.5–5.1)
PROT SERPL-MCNC: 6.3 G/DL (ref 6.4–8.3)
RBC # BLD AUTO: 3.19 M/UL (ref 3.8–5.8)
SODIUM SERPL-SCNC: 133 MMOL/L (ref 136–145)
WBC OTHER # BLD: 8.6 K/UL (ref 4.5–11.5)

## 2025-04-22 PROCEDURE — 2500000003 HC RX 250 WO HCPCS: Performed by: NURSE PRACTITIONER

## 2025-04-22 PROCEDURE — 6370000000 HC RX 637 (ALT 250 FOR IP): Performed by: INTERNAL MEDICINE

## 2025-04-22 PROCEDURE — 83735 ASSAY OF MAGNESIUM: CPT

## 2025-04-22 PROCEDURE — 36415 COLL VENOUS BLD VENIPUNCTURE: CPT

## 2025-04-22 PROCEDURE — 2140000000 HC CCU INTERMEDIATE R&B

## 2025-04-22 PROCEDURE — 6370000000 HC RX 637 (ALT 250 FOR IP): Performed by: NURSE PRACTITIONER

## 2025-04-22 PROCEDURE — 86850 RBC ANTIBODY SCREEN: CPT

## 2025-04-22 PROCEDURE — 6360000002 HC RX W HCPCS

## 2025-04-22 PROCEDURE — 6360000002 HC RX W HCPCS: Performed by: NURSE PRACTITIONER

## 2025-04-22 PROCEDURE — 85027 COMPLETE CBC AUTOMATED: CPT

## 2025-04-22 PROCEDURE — 94640 AIRWAY INHALATION TREATMENT: CPT

## 2025-04-22 PROCEDURE — 86900 BLOOD TYPING SEROLOGIC ABO: CPT

## 2025-04-22 PROCEDURE — 93798 PHYS/QHP OP CAR RHAB W/ECG: CPT

## 2025-04-22 PROCEDURE — 80053 COMPREHEN METABOLIC PANEL: CPT

## 2025-04-22 PROCEDURE — 99232 SBSQ HOSP IP/OBS MODERATE 35: CPT | Performed by: INTERNAL MEDICINE

## 2025-04-22 PROCEDURE — 6370000000 HC RX 637 (ALT 250 FOR IP): Performed by: PHYSICIAN ASSISTANT

## 2025-04-22 PROCEDURE — 86901 BLOOD TYPING SEROLOGIC RH(D): CPT

## 2025-04-22 PROCEDURE — 97530 THERAPEUTIC ACTIVITIES: CPT

## 2025-04-22 PROCEDURE — 82962 GLUCOSE BLOOD TEST: CPT

## 2025-04-22 PROCEDURE — 94669 MECHANICAL CHEST WALL OSCILL: CPT

## 2025-04-22 RX ORDER — FUROSEMIDE 10 MG/ML
30 INJECTION INTRAMUSCULAR; INTRAVENOUS ONCE
Status: COMPLETED | OUTPATIENT
Start: 2025-04-22 | End: 2025-04-22

## 2025-04-22 RX ADMIN — Medication 500 MG: at 08:05

## 2025-04-22 RX ADMIN — FUROSEMIDE 30 MG: 10 INJECTION, SOLUTION INTRAMUSCULAR; INTRAVENOUS at 07:49

## 2025-04-22 RX ADMIN — CARVEDILOL 3.12 MG: 3.12 TABLET, FILM COATED ORAL at 17:34

## 2025-04-22 RX ADMIN — ASPIRIN 81 MG: 81 TABLET, COATED ORAL at 07:53

## 2025-04-22 RX ADMIN — IPRATROPIUM BROMIDE AND ALBUTEROL SULFATE 1 DOSE: 2.5; .5 SOLUTION RESPIRATORY (INHALATION) at 21:26

## 2025-04-22 RX ADMIN — OXYCODONE HYDROCHLORIDE 5 MG: 5 TABLET ORAL at 18:24

## 2025-04-22 RX ADMIN — ACETAMINOPHEN 650 MG: 325 TABLET ORAL at 21:08

## 2025-04-22 RX ADMIN — CLOPIDOGREL BISULFATE 75 MG: 75 TABLET, FILM COATED ORAL at 07:53

## 2025-04-22 RX ADMIN — FERROUS SULFATE TAB 325 MG (65 MG ELEMENTAL FE) 325 MG: 325 (65 FE) TAB at 07:53

## 2025-04-22 RX ADMIN — INSULIN GLARGINE 5 UNITS: 100 INJECTION, SOLUTION SUBCUTANEOUS at 21:08

## 2025-04-22 RX ADMIN — INSULIN LISPRO 1 UNITS: 100 INJECTION, SOLUTION INTRAVENOUS; SUBCUTANEOUS at 21:08

## 2025-04-22 RX ADMIN — OXYCODONE HYDROCHLORIDE 10 MG: 10 TABLET ORAL at 05:40

## 2025-04-22 RX ADMIN — Medication 400 MG: at 21:08

## 2025-04-22 RX ADMIN — IPRATROPIUM BROMIDE AND ALBUTEROL SULFATE 1 DOSE: 2.5; .5 SOLUTION RESPIRATORY (INHALATION) at 09:05

## 2025-04-22 RX ADMIN — OXYCODONE HYDROCHLORIDE 5 MG: 5 TABLET ORAL at 14:08

## 2025-04-22 RX ADMIN — IPRATROPIUM BROMIDE AND ALBUTEROL SULFATE 1 DOSE: 2.5; .5 SOLUTION RESPIRATORY (INHALATION) at 17:25

## 2025-04-22 RX ADMIN — MAGNESIUM SULFATE HEPTAHYDRATE 2000 MG: 40 INJECTION, SOLUTION INTRAVENOUS at 07:51

## 2025-04-22 RX ADMIN — FOLIC ACID 1 MG: 1 TABLET ORAL at 07:53

## 2025-04-22 RX ADMIN — SODIUM CHLORIDE, PRESERVATIVE FREE 10 ML: 5 INJECTION INTRAVENOUS at 21:09

## 2025-04-22 RX ADMIN — COLLAGENASE SANTYL: 250 OINTMENT TOPICAL at 07:54

## 2025-04-22 RX ADMIN — OXYCODONE HYDROCHLORIDE 10 MG: 10 TABLET ORAL at 00:34

## 2025-04-22 RX ADMIN — CARVEDILOL 3.12 MG: 3.12 TABLET, FILM COATED ORAL at 07:53

## 2025-04-22 RX ADMIN — SODIUM CHLORIDE, PRESERVATIVE FREE 10 ML: 5 INJECTION INTRAVENOUS at 07:49

## 2025-04-22 RX ADMIN — INSULIN LISPRO 1 UNITS: 100 INJECTION, SOLUTION INTRAVENOUS; SUBCUTANEOUS at 13:18

## 2025-04-22 RX ADMIN — FERROUS SULFATE TAB 325 MG (65 MG ELEMENTAL FE) 325 MG: 325 (65 FE) TAB at 17:34

## 2025-04-22 RX ADMIN — OXYCODONE HYDROCHLORIDE 10 MG: 10 TABLET ORAL at 09:55

## 2025-04-22 RX ADMIN — PANTOPRAZOLE SODIUM 40 MG: 40 TABLET, DELAYED RELEASE ORAL at 06:57

## 2025-04-22 RX ADMIN — ENOXAPARIN SODIUM 40 MG: 100 INJECTION SUBCUTANEOUS at 07:53

## 2025-04-22 RX ADMIN — ATORVASTATIN CALCIUM 40 MG: 40 TABLET, FILM COATED ORAL at 21:09

## 2025-04-22 RX ADMIN — Medication 500 MG: at 21:08

## 2025-04-22 RX ADMIN — Medication 400 MG: at 07:53

## 2025-04-22 ASSESSMENT — PAIN DESCRIPTION - FREQUENCY
FREQUENCY: CONTINUOUS

## 2025-04-22 ASSESSMENT — PAIN SCALES - GENERAL
PAINLEVEL_OUTOF10: 2
PAINLEVEL_OUTOF10: 8
PAINLEVEL_OUTOF10: 2
PAINLEVEL_OUTOF10: 7
PAINLEVEL_OUTOF10: 4
PAINLEVEL_OUTOF10: 6
PAINLEVEL_OUTOF10: 8
PAINLEVEL_OUTOF10: 2
PAINLEVEL_OUTOF10: 4
PAINLEVEL_OUTOF10: 8

## 2025-04-22 ASSESSMENT — PAIN - FUNCTIONAL ASSESSMENT
PAIN_FUNCTIONAL_ASSESSMENT: ACTIVITIES ARE NOT PREVENTED

## 2025-04-22 ASSESSMENT — PAIN DESCRIPTION - PAIN TYPE
TYPE: SURGICAL PAIN

## 2025-04-22 ASSESSMENT — PAIN DESCRIPTION - LOCATION
LOCATION: RIB CAGE
LOCATION: CHEST;RIB CAGE
LOCATION: CHEST;RIB CAGE
LOCATION: RIB CAGE

## 2025-04-22 ASSESSMENT — PAIN DESCRIPTION - DESCRIPTORS
DESCRIPTORS: ACHING;SORE
DESCRIPTORS: ACHING;DISCOMFORT;SORE
DESCRIPTORS: ACHING;SORE
DESCRIPTORS: DISCOMFORT;SORE

## 2025-04-22 ASSESSMENT — PAIN DESCRIPTION - ONSET
ONSET: ON-GOING

## 2025-04-22 ASSESSMENT — PAIN DESCRIPTION - ORIENTATION
ORIENTATION: RIGHT;LEFT
ORIENTATION: RIGHT;LEFT
ORIENTATION: MID;LEFT;RIGHT
ORIENTATION: LEFT;RIGHT
ORIENTATION: RIGHT;LEFT
ORIENTATION: RIGHT;LEFT;MID

## 2025-04-22 NOTE — CARE COORDINATION
Transition of care update: POD#5 CABG x 2. CT removed.  and other labs noted. Iv lasix 30mg x 1. Met with pt in room. Per Matilda with [facility] co-pay cost is $214 per day and pt was to talk with his brother, Tl, about helping with co-pay cost. Pt said he has not spoke with his brother yet.     1320  Wires cut. Met with pt in room. Pt said his brother, Tl, is going to help with co-pay cost. Left vm with Matilda with [facility] to update her. [facility]. Plan is jeferson at [facility] when medically ready and pre cert is obtained.

## 2025-04-22 NOTE — DISCHARGE INSTRUCTIONS
Blood in the urine is to be expected  Increase oral fluid intake for 2-3 days  Call the [facility] (Dr. Rivas/Cortney/Michael/Meghan) office for a follow up appointment in 3 to 4 weeks--[phone]      Discharge Instructions for Open Heart Surgery    What you will need at home:               * Accurate Scale               *  Digital Thermometer               *  Antibacterial Soap                *  Clean Wash Cloths             *  Someone to be with you for one week    ***IF YOU ARE PRESCRIBED ELIQUIS: your prescription will be filled and provided to you PRIOR to your discharge from the hospital. Please be sure to take this medication with you and and take as prescribed. Call our office with any questions or concerns.***            DO NOT LIFT, PUSH, OR PULL ANYTHING OVER 5 POUNDS FOR 8 WEEK from the day of surgery. This could prevent your sternum from healing properly.                    ?When you are given permission from your surgeon to begin lifting again,                     do so gradually. You will need time to build your muscle strength.                  ? A gallon of milk is more than 5 lbs. you should buy ½ gallons.    NEVER SMOKE AGAIN!  Absolutely no tobacco products!  Do not allow others to smoke around you.  Second hand smoke can be just as bad.  The [employer] has a free program available, call 1-210.793.6624.      Activity: See your activity diary in your binder for your walking plan.  Plan to walk indoors on days the temperature is below 40? or over 80? or during smog alerts.  At first limit your stair climbing to once or twice a day.  You may use the handrail for balance only.  Do not pull yourself up with it.  It is not unusual to feel tired for the first few weeks, but walking builds up your strength.    Driving: Do not drive a car or any vehicle for 4 weeks from the day of surgery.  You can not drive a truck, tractor or  for 8 weeks from the day of surgery.  After 4

## 2025-04-22 NOTE — CONSULTS
4/22/2025 3:50 PM  Jerome Steel  [id number]     Chief Complaint: BPH/Urinary retention/History of prostatitis/History of prostatic abscess    History of Present Illness:      The patient is a 64 y.o. male patient who presented to the hospital to undergo CABG. Urology is consulted for taylor catheter recommendations.    He is known to our practice and is a patient of Dr. Kevin Rodriguez he has a history of BPH, prostatitis, prostate abscess, and urinary retention. He was seen in our office on 4/10/2025 and underwent a voiding trial which he failed.  He is very weak and tired.  He has an indwelling Taylor.  He is minimally ambulatory and hopes to go to rehab at [facility] soon.  He has a known scrotal hernia.  He denies any scrotal or catheter discomfort at this time.  He denies any recent hematuria or UTIs.  He takes Flomax without side effects.  His PSA has been normal in the past.  CT scan on 2/27/2025 shows no acute intra-abdominal or pelvic process, hepatic cirrhosis, constipation, stable abnormal appearance of the prostate gland which was enlarged with multifocal cystic changes, and a right inguinal hernia into the scrotum    Past Medical History:   Diagnosis Date    Abscess of prostate     Anemia     Arthritis     Atherosclerotic heart disease of native coronary artery without angina pectoris     Bilateral carpal tunnel syndrome 2016    Cerebral artery occlusion with cerebral infarction ([facility])     Chronic back pain     COPD (chronic obstructive pulmonary disease) ([facility])     Coronary artery disease involving native coronary artery of native heart without angina pectoris 09/16/2022    CVA (cerebral vascular accident) ([facility]) 11/2015, 2017    Diabetes mellitus ([facility])     Type 2    Diabetic foot ulcer with osteomyelitis ([facility]) 12/21/2021    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 2 diabetes mellitus, with necrosis of bone 12/21/2021    Disease of multiple valves of heart     Dysphagia     EtOH 
Department of Podiatry   Consult Note        Reason for Consult: Right heel wound    CHIEF COMPLAINT: Right heel wound    HISTORY OF PRESENT ILLNESS:                The patient is a 64 y.o. male with significant past medical history of CAD, type 2 diabetes mellitus with hyperglycemia, hypertension, peripheral arterial disease, hyperlipidemia, who presents as consult to podiatry service for right heel wound.  Patient follows outpatient with Dr. Harrell for this issue.  Patient is currently status post coronary artery bypass with thoracic surgery today and is currently intubated.  Patient unable to confirm or deny constitutional symptoms or obtain history of wound.  Patient has been offloaded with heel cushions.    Past Medical History:        Diagnosis Date    Abscess of prostate     Anemia     Arthritis     Atherosclerotic heart disease of native coronary artery without angina pectoris     Bilateral carpal tunnel syndrome 2016    Cerebral artery occlusion with cerebral infarction ([facility])     Chronic back pain     COPD (chronic obstructive pulmonary disease) ([facility])     Coronary artery disease involving native coronary artery of native heart without angina pectoris 09/16/2022    CVA (cerebral vascular accident) ([facility]) 11/2015, 2017    Diabetes mellitus ([facility])     Type 2    Diabetic foot ulcer with osteomyelitis ([facility]) 12/21/2021    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 2 diabetes mellitus, with necrosis of bone 12/21/2021    Disease of multiple valves of heart     Dysphagia     EtOH dependence ([facility])     Falls     GERD (gastroesophageal reflux disease)     Hemiparesis affecting left side as late effect of cerebrovascular accident ([facility])     LEFT SIDE NON DOMINANT FOLLOWING STROKE    Hydrocele     Hyperlipidemia     Hypertension     Hypothyroid     Inguinal hernia     MI (myocardial infarction) ([facility])     Moderate aortic regurgitation     Multiple fractures of rib involving four or more ribs     left    Obstructive and 
ENDOCRINOLOGY INITIAL CONSULTATION NOTE      Date of admission: 4/17/2025  Date of service: 4/18/2025  Admitting physician: An Garcia MD   Primary Care Physician: Genet Kraus DO  Consultant physician: Aki Linda MD     Reason for the consultation:  Uncontrolled DM    History of Present Illness:  The history is provided by the patient. Accuracy of the patient data is excellent    Jerome Steel is a very pleasant 64 y.o. old male with PMH of diabetes type 2, MV CAD, dyslipidemia and other listed below admitted to [facility] on 4/17/2025 for elective CABG, endocrine service was consulted for diabetes management.  The patient underwent CABG surgery on April 17, 2025.  The patient denies any history of fever, chills    Prior to admission  The patient was diagnosed with type 2 diabetes long time ago.  Prior to the admission he was on metformin 500 mg twice daily, Jardiance 10 mg daily.  The patient has been checking glucose level daily and reading has been in range most of the time.  He denies any hypoglycemic episodes.  The patient reports both microvascular and macrovascular diabetes complications.  He is due for diabetic eye exam  Lab Results   Component Value Date/Time    LABA1C 5.9 04/10/2025 10:26 AM       Inpatient diet:   Carb Restricted diet     Point of care glucose monitoring   (Independently reviewed)   Recent Labs     04/18/25  0202 04/18/25  0305 04/18/25  0411 04/18/25  0508 04/18/25  0639 04/18/25  0958 04/18/25  1421 04/18/25  1957   POCGLU 157* 130* 111* 83 94 150* 236* 198*       Past medical history:   Past Medical History:   Diagnosis Date    Abscess of prostate     Anemia     Arthritis     Atherosclerotic heart disease of native coronary artery without angina pectoris     Bilateral carpal tunnel syndrome 2016    Cerebral artery occlusion with cerebral infarction ([facility])     Chronic back pain     COPD (chronic obstructive pulmonary disease) ([facility])     Coronary artery disease involving native 
  2023 Declined CTS consult  12/2024 ASA 81 mg re-ordered by his primary cardiologist   2/18/2025 [facility] Dr. Nielsen: ECG showed 1 mm ST depression in leads V4, V5, and V6. There were no noted arrhythmias during recovery. There were no ST changes after the test. Mild severity left ventricular stress perfusion defect that is small in size present in the inferior segment(s) that is fixed, suggest prion Mi vs attenuation. EF 59%  2/28/2025 [facility] Dr. Prakash: Severe left main artery stenosis and chronic total occlusion of right coronary vessel with small collaterals full report below  VHD/PFO   2015 Limited TTE(CVA) Dr. Haywood: EF 55%. NWM.  DD.  Mild concentric LVH.  Normal RV size and function.  Normal atria.  No VHD.   2/17/2021 TTE Dr Edwards: EF 60-65%.  Indeterminate diastolic function.  Normal wall motion. Mild cLVH .  Normal RV size and function.  Normal atria.  Mild AI.  No pericardial effusion.  Aortic root dimensions within normal limits  2/22/2021 [facility] Dr. Nielsen: Agitated saline showed some right to left shunt suggestive of small PFO.  Mildly enlarged LA. mild-moderate AI.  Grade 2 plaque noted in the descending thoracic aorta  1/9/2023 TTE with bubble @ [facility]: EF 60-65%. NWM. Normal LV size and function. Normal atria. Bubble study NEGATIVE. RV not ell visualized. Mild AS. DI 0.58. No AI. MG 9.0, No pericardial effusion. Mild dilatation of aortic root.   10/2/2023 TTE @ : EF 50% global hypokinesis of LV with minor regional variations.  Normal LV size.  LV diastolic filling was not assessed.  LA and RA not assessed normal RV size and function.  Aortic valve regurgitation was not assessed. .  2/18/2025 TTE Dr. Duarte: EF 55-60% mildly dilated LV.  Mild AI moderate-severe AS by planimetry with calculated aortic valve area of 0.9 cm². Valve area by Doppler images is 1.4 cm². Visually the aortic stenosis looks moderate to severe.  Mild-moderate MR mild TR.  Mildly dilated LA.  Normal RA.  Normal RV size. 
lesion(s)  Neurological: negative for seizures and tremors  Musculoskeletal: Negative    Psychiatric: Negative   : As above in the HPI, otherwise negative  All other reviews are negative    Physical Exam:     Vitals:  BP 94/64   Pulse 74   Temp 97 °F (36.1 °C) (Temporal)   Resp 16   Ht 1.778 m (5' 10\")   Wt 78.8 kg (173 lb 11.2 oz)   SpO2 95%   BMI 24.92 kg/m²     General:  Awake, alert, oriented X 3. No apparent distress.  HEENT:  Normocephalic, atraumatic.  Lungs:  Respirations symmetric and non-labored.  Abdomen:  soft, nontender, no masses  Extremities:  No clubbing, cyanosis, or edema  Skin:  Warm and dry, no open lesions or rashes  Neuro: There are no motor or sensory deficits in the 4 quadrant extremities   Rectal: deferred  Genitourinary:  ***    Labs:     Recent Labs     04/20/25  0646 04/21/25  0614 04/22/25  0543   WBC 10.2 9.8 8.6   RBC 3.24* 3.16* 3.19*   HGB 9.4* 9.2* 9.4*   HCT 29.6* 28.3* 28.7*   MCV 91.4 89.6 90.0   MCH 29.0 29.1 29.5   MCHC 31.8* 32.5 32.8   RDW 18.2* 17.8* 17.5*    290 322   MPV 9.3 9.2 9.0         Recent Labs     04/20/25  0646 04/21/25  0614 04/22/25  0543   CREATININE 0.6* 0.6* 0.6*       Lab Results   Component Value Date    PSA 0.41 12/27/2024    PSA 0.48 12/26/2024       Imaging:       Assessment/plan:  ***        Electronically signed by ALBERT Hearn CNP on 4/22/2025 at 12:52 PM  [facility]

## 2025-04-22 NOTE — PATIENT CARE CONFERENCE
P Quality Flow/Interdisciplinary Rounds Progress Note        Quality Flow Rounds held on April 22, 2025    Disciplines Attending:  Bedside Nurse, , , and Nursing Unit Leadership    Jerome Steel was admitted on 4/17/2025  4:54 AM    Anticipated Discharge Date:       Disposition:    Maik Score:  Maik Scale Score: 16    BS RISK OF UNPLANNED READMISSION 2.0             26.3 Total Score        Discussed patient goal for the day, patient clinical progression, and barriers to discharge.  The following Goal(s) of the Day/Commitment(s) have been identified:  ambulate/discharge planning       Susan Campbell RN  April 22, 2025

## 2025-04-23 LAB
ALBUMIN SERPL-MCNC: 2.9 G/DL (ref 3.5–5.2)
ALP SERPL-CCNC: 127 U/L (ref 40–129)
ALT SERPL-CCNC: 9 U/L (ref 0–50)
ANION GAP SERPL CALCULATED.3IONS-SCNC: 11 MMOL/L (ref 7–16)
AST SERPL-CCNC: 23 U/L (ref 0–50)
BILIRUB SERPL-MCNC: 0.5 MG/DL (ref 0–1.2)
BUN SERPL-MCNC: 21 MG/DL (ref 8–23)
CALCIUM SERPL-MCNC: 8.8 MG/DL (ref 8.8–10.2)
CHLORIDE SERPL-SCNC: 100 MMOL/L (ref 98–107)
CO2 SERPL-SCNC: 24 MMOL/L (ref 22–29)
CREAT SERPL-MCNC: 0.7 MG/DL (ref 0.7–1.2)
ERYTHROCYTE [DISTWIDTH] IN BLOOD BY AUTOMATED COUNT: 17.7 % (ref 11.5–15)
GFR, ESTIMATED: >90 ML/MIN/1.73M2
GLUCOSE BLD-MCNC: 124 MG/DL (ref 74–99)
GLUCOSE BLD-MCNC: 185 MG/DL (ref 74–99)
GLUCOSE BLD-MCNC: 187 MG/DL (ref 74–99)
GLUCOSE BLD-MCNC: 193 MG/DL (ref 74–99)
GLUCOSE SERPL-MCNC: 112 MG/DL (ref 74–99)
HCT VFR BLD AUTO: 26.2 % (ref 37–54)
HGB BLD-MCNC: 8.7 G/DL (ref 12.5–16.5)
MAGNESIUM SERPL-MCNC: 2 MG/DL (ref 1.6–2.4)
MAGNESIUM SERPL-MCNC: 2.5 MG/DL (ref 1.6–2.4)
MCH RBC QN AUTO: 29.7 PG (ref 26–35)
MCHC RBC AUTO-ENTMCNC: 33.2 G/DL (ref 32–34.5)
MCV RBC AUTO: 89.4 FL (ref 80–99.9)
PLATELET # BLD AUTO: 289 K/UL (ref 130–450)
PMV BLD AUTO: 9.1 FL (ref 7–12)
POTASSIUM SERPL-SCNC: 3.7 MMOL/L (ref 3.5–5.1)
PROT SERPL-MCNC: 6 G/DL (ref 6.4–8.3)
RBC # BLD AUTO: 2.93 M/UL (ref 3.8–5.8)
SODIUM SERPL-SCNC: 135 MMOL/L (ref 136–145)
WBC OTHER # BLD: 7.7 K/UL (ref 4.5–11.5)

## 2025-04-23 PROCEDURE — 6370000000 HC RX 637 (ALT 250 FOR IP): Performed by: NURSE PRACTITIONER

## 2025-04-23 PROCEDURE — 97530 THERAPEUTIC ACTIVITIES: CPT

## 2025-04-23 PROCEDURE — 82962 GLUCOSE BLOOD TEST: CPT

## 2025-04-23 PROCEDURE — 6360000002 HC RX W HCPCS: Performed by: NURSE PRACTITIONER

## 2025-04-23 PROCEDURE — 80053 COMPREHEN METABOLIC PANEL: CPT

## 2025-04-23 PROCEDURE — 99232 SBSQ HOSP IP/OBS MODERATE 35: CPT | Performed by: INTERNAL MEDICINE

## 2025-04-23 PROCEDURE — 36415 COLL VENOUS BLD VENIPUNCTURE: CPT

## 2025-04-23 PROCEDURE — 83735 ASSAY OF MAGNESIUM: CPT

## 2025-04-23 PROCEDURE — 2140000000 HC CCU INTERMEDIATE R&B

## 2025-04-23 PROCEDURE — 93798 PHYS/QHP OP CAR RHAB W/ECG: CPT

## 2025-04-23 PROCEDURE — 94669 MECHANICAL CHEST WALL OSCILL: CPT

## 2025-04-23 PROCEDURE — 97535 SELF CARE MNGMENT TRAINING: CPT

## 2025-04-23 PROCEDURE — 6370000000 HC RX 637 (ALT 250 FOR IP): Performed by: INTERNAL MEDICINE

## 2025-04-23 PROCEDURE — 94640 AIRWAY INHALATION TREATMENT: CPT

## 2025-04-23 PROCEDURE — 2500000003 HC RX 250 WO HCPCS: Performed by: NURSE PRACTITIONER

## 2025-04-23 PROCEDURE — 85027 COMPLETE CBC AUTOMATED: CPT

## 2025-04-23 RX ORDER — ATORVASTATIN CALCIUM 40 MG/1
40 TABLET, FILM COATED ORAL NIGHTLY
Qty: 30 TABLET | Refills: 3
Start: 2025-04-23

## 2025-04-23 RX ORDER — METOPROLOL SUCCINATE 25 MG/1
12.5 TABLET, EXTENDED RELEASE ORAL DAILY
Qty: 30 TABLET | Refills: 3 | Status: SHIPPED | OUTPATIENT
Start: 2025-04-23

## 2025-04-23 RX ORDER — DOCUSATE SODIUM 100 MG/1
100 CAPSULE, LIQUID FILLED ORAL 2 TIMES DAILY PRN
Qty: 40 CAPSULE | Refills: 0
Start: 2025-04-23 | End: 2025-05-23

## 2025-04-23 RX ORDER — CLOPIDOGREL BISULFATE 75 MG/1
75 TABLET ORAL DAILY
Qty: 30 TABLET | Refills: 1
Start: 2025-04-24 | End: 2025-06-23

## 2025-04-23 RX ORDER — ASPIRIN 81 MG/1
81 TABLET ORAL DAILY
Qty: 30 TABLET | Refills: 3
Start: 2025-04-24

## 2025-04-23 RX ORDER — TAMSULOSIN HYDROCHLORIDE 0.4 MG/1
0.4 CAPSULE ORAL DAILY
Status: DISCONTINUED | OUTPATIENT
Start: 2025-04-23 | End: 2025-04-24 | Stop reason: HOSPADM

## 2025-04-23 RX ORDER — ASCORBIC ACID 500 MG
500 TABLET ORAL 2 TIMES DAILY
Qty: 60 TABLET | Refills: 0
Start: 2025-04-23 | End: 2025-05-23

## 2025-04-23 RX ORDER — OXYCODONE AND ACETAMINOPHEN 5; 325 MG/1; MG/1
1 TABLET ORAL EVERY 6 HOURS PRN
Qty: 28 TABLET | Refills: 0 | Status: SHIPPED | OUTPATIENT
Start: 2025-04-23 | End: 2025-04-30

## 2025-04-23 RX ORDER — ENOXAPARIN SODIUM 100 MG/ML
40 INJECTION SUBCUTANEOUS DAILY
Qty: 12 ML | Refills: 0
Start: 2025-04-24 | End: 2025-05-24

## 2025-04-23 RX ORDER — LANOLIN ALCOHOL/MO/W.PET/CERES
400 CREAM (GRAM) TOPICAL 2 TIMES DAILY
Qty: 28 TABLET | Refills: 0
Start: 2025-04-23 | End: 2025-05-07

## 2025-04-23 RX ORDER — METOPROLOL SUCCINATE 25 MG/1
12.5 TABLET, EXTENDED RELEASE ORAL DAILY
Status: DISCONTINUED | OUTPATIENT
Start: 2025-04-23 | End: 2025-04-24

## 2025-04-23 RX ORDER — POLYETHYLENE GLYCOL 3350 17 G/17G
17 POWDER, FOR SOLUTION ORAL DAILY
Qty: 5 PACKET | Refills: 0
Start: 2025-04-23 | End: 2025-04-28

## 2025-04-23 RX ADMIN — OXYCODONE HYDROCHLORIDE 5 MG: 5 TABLET ORAL at 06:14

## 2025-04-23 RX ADMIN — OXYCODONE HYDROCHLORIDE 5 MG: 5 TABLET ORAL at 20:54

## 2025-04-23 RX ADMIN — DIPHENHYDRAMINE HYDROCHLORIDE 25 MG: 25 TABLET ORAL at 20:55

## 2025-04-23 RX ADMIN — COLLAGENASE SANTYL: 250 OINTMENT TOPICAL at 09:13

## 2025-04-23 RX ADMIN — OXYCODONE HYDROCHLORIDE 5 MG: 5 TABLET ORAL at 16:25

## 2025-04-23 RX ADMIN — INSULIN LISPRO 1 UNITS: 100 INJECTION, SOLUTION INTRAVENOUS; SUBCUTANEOUS at 21:04

## 2025-04-23 RX ADMIN — CLOPIDOGREL BISULFATE 75 MG: 75 TABLET, FILM COATED ORAL at 09:05

## 2025-04-23 RX ADMIN — POTASSIUM CHLORIDE 40 MEQ: 1500 TABLET, EXTENDED RELEASE ORAL at 11:50

## 2025-04-23 RX ADMIN — TAMSULOSIN HYDROCHLORIDE 0.4 MG: 0.4 CAPSULE ORAL at 10:36

## 2025-04-23 RX ADMIN — IPRATROPIUM BROMIDE AND ALBUTEROL SULFATE 1 DOSE: 2.5; .5 SOLUTION RESPIRATORY (INHALATION) at 08:55

## 2025-04-23 RX ADMIN — OXYCODONE HYDROCHLORIDE 5 MG: 5 TABLET ORAL at 11:48

## 2025-04-23 RX ADMIN — ENOXAPARIN SODIUM 40 MG: 100 INJECTION SUBCUTANEOUS at 09:03

## 2025-04-23 RX ADMIN — SODIUM CHLORIDE, PRESERVATIVE FREE 10 ML: 5 INJECTION INTRAVENOUS at 09:05

## 2025-04-23 RX ADMIN — Medication 400 MG: at 09:03

## 2025-04-23 RX ADMIN — ACETAMINOPHEN 650 MG: 325 TABLET ORAL at 10:39

## 2025-04-23 RX ADMIN — FERROUS SULFATE TAB 325 MG (65 MG ELEMENTAL FE) 325 MG: 325 (65 FE) TAB at 16:25

## 2025-04-23 RX ADMIN — FERROUS SULFATE TAB 325 MG (65 MG ELEMENTAL FE) 325 MG: 325 (65 FE) TAB at 09:05

## 2025-04-23 RX ADMIN — OXYCODONE HYDROCHLORIDE 5 MG: 5 TABLET ORAL at 00:29

## 2025-04-23 RX ADMIN — Medication 400 MG: at 20:55

## 2025-04-23 RX ADMIN — PANTOPRAZOLE SODIUM 40 MG: 40 TABLET, DELAYED RELEASE ORAL at 06:14

## 2025-04-23 RX ADMIN — MAGNESIUM SULFATE HEPTAHYDRATE 2000 MG: 40 INJECTION, SOLUTION INTRAVENOUS at 09:11

## 2025-04-23 RX ADMIN — ASPIRIN 81 MG: 81 TABLET, COATED ORAL at 09:02

## 2025-04-23 RX ADMIN — Medication 500 MG: at 09:05

## 2025-04-23 RX ADMIN — INSULIN GLARGINE 5 UNITS: 100 INJECTION, SOLUTION SUBCUTANEOUS at 20:55

## 2025-04-23 RX ADMIN — INSULIN LISPRO 1 UNITS: 100 INJECTION, SOLUTION INTRAVENOUS; SUBCUTANEOUS at 16:26

## 2025-04-23 RX ADMIN — FOLIC ACID 1 MG: 1 TABLET ORAL at 09:05

## 2025-04-23 RX ADMIN — IPRATROPIUM BROMIDE AND ALBUTEROL SULFATE 1 DOSE: 2.5; .5 SOLUTION RESPIRATORY (INHALATION) at 21:25

## 2025-04-23 RX ADMIN — Medication 500 MG: at 20:55

## 2025-04-23 RX ADMIN — ATORVASTATIN CALCIUM 40 MG: 40 TABLET, FILM COATED ORAL at 20:55

## 2025-04-23 RX ADMIN — METOPROLOL SUCCINATE 12.5 MG: 25 TABLET, EXTENDED RELEASE ORAL at 14:17

## 2025-04-23 RX ADMIN — IPRATROPIUM BROMIDE AND ALBUTEROL SULFATE 1 DOSE: 2.5; .5 SOLUTION RESPIRATORY (INHALATION) at 13:07

## 2025-04-23 RX ADMIN — INSULIN LISPRO 1 UNITS: 100 INJECTION, SOLUTION INTRAVENOUS; SUBCUTANEOUS at 11:48

## 2025-04-23 ASSESSMENT — PAIN DESCRIPTION - DESCRIPTORS
DESCRIPTORS: DISCOMFORT;SORE;ACHING
DESCRIPTORS: ACHING;DISCOMFORT;DULL
DESCRIPTORS: DISCOMFORT;SORE;ACHING

## 2025-04-23 ASSESSMENT — PAIN DESCRIPTION - LOCATION
LOCATION: RIB CAGE
LOCATION: CHEST
LOCATION: BACK;CHEST
LOCATION: CHEST;BACK
LOCATION: RIB CAGE

## 2025-04-23 ASSESSMENT — PAIN DESCRIPTION - PAIN TYPE
TYPE: SURGICAL PAIN
TYPE: SURGICAL PAIN

## 2025-04-23 ASSESSMENT — PAIN DESCRIPTION - ONSET
ONSET: ON-GOING
ONSET: ON-GOING

## 2025-04-23 ASSESSMENT — PAIN DESCRIPTION - FREQUENCY
FREQUENCY: CONTINUOUS
FREQUENCY: CONTINUOUS

## 2025-04-23 ASSESSMENT — PAIN DESCRIPTION - ORIENTATION
ORIENTATION: RIGHT;LEFT;LOWER
ORIENTATION: RIGHT;LEFT
ORIENTATION: RIGHT;LEFT
ORIENTATION: LEFT;RIGHT;LOWER
ORIENTATION: LOWER

## 2025-04-23 ASSESSMENT — PAIN - FUNCTIONAL ASSESSMENT
PAIN_FUNCTIONAL_ASSESSMENT: ACTIVITIES ARE NOT PREVENTED

## 2025-04-23 ASSESSMENT — PAIN SCALES - GENERAL
PAINLEVEL_OUTOF10: 6
PAINLEVEL_OUTOF10: 3
PAINLEVEL_OUTOF10: 4
PAINLEVEL_OUTOF10: 8
PAINLEVEL_OUTOF10: 5
PAINLEVEL_OUTOF10: 8
PAINLEVEL_OUTOF10: 4
PAINLEVEL_OUTOF10: 1
PAINLEVEL_OUTOF10: 5

## 2025-04-23 ASSESSMENT — PAIN SCALES - WONG BAKER: WONGBAKER_NUMERICALRESPONSE: HURTS A LITTLE BIT

## 2025-04-23 NOTE — DISCHARGE INSTR - COC
Continuity of Care Form    Patient Name: Jerome Steel   :  1960  MRN:  [id number]    Admit date:  2025  Discharge date:      Code Status Order: Full Code   Advance Directives:    Date/Time Healthcare Directive Type of Healthcare Directive Copy in Chart Healthcare Agent Appointed Healthcare Agent's Name Healthcare Agent's Phone Number    25 [phone] No, patient does not have an advance directive for healthcare treatment  --  --  --  --  --             Admitting Physician:  No admitting provider for patient encounter.  PCP: Genet Kraus DO    Discharging Nurse: LYNETTE Gill  Discharging Hospital Unit/Room#: 6517/6517-A  Discharging Unit Phone Number: [phone]    Emergency Contact:   Extended Emergency Contact Information  Primary Emergency Contact: Yimi Steel  Home Phone: [phone]  Mobile Phone: [phone]  Relation: Brother/Sister   needed? No  Secondary Emergency Contact: miguelina julian  Home Phone: [phone]  Mobile Phone: [phone]  Relation: Other  Preferred language: English   needed? No    Past Surgical History:  Past Surgical History:   Procedure Laterality Date    CARDIAC PROCEDURE N/A 2025    Right heart cath performed by Krissy Liao MD at [facility] CARDIAC CATH LAB    CARDIAC PROCEDURE N/A 2025    Left heart cath / coronary angiography performed by Terri Duarte MD at [facility] CARDIAC CATH/IR    CARPAL TUNNEL RELEASE  10/01/2016    Dr. Chang    CORONARY ARTERY BYPASS GRAFT N/A 2025    Pump Assist Coronary Artery Bypass Grafting performed by Baltazar Sterling MD at [facility] OR    FINGER AMPUTATION      FOOT DEBRIDEMENT Left 2021    LEFT FOOT DEBRIDEMENT WITH BONE BIOPSY, WOUND VAC APPLICATION performed by Rober Harrell DPM at [facility] OR    FOOT DEBRIDEMENT Left 2022    LEFT FOOT DEBRIDEMENT INCISION AND DRAINAGE performed by Rober Harrell DPM at [facility] OR    FOOT DEBRIDEMENT Left 2022    LEFT FOOT PARTIAL CALCANECTOMY AND

## 2025-04-23 NOTE — CARE COORDINATION
Transition of care update: POD#6 CABG x2. Labs results from today and orders noted. Cole catheter. Podiatry and endocrinology following. Urology - ok for dc. Plan is jeferson at [facility]. Pre cert was started yesterday 04/22. Ambulance form was started. Pt is not safe to go by way [facility]. EXEMPT completed. Cardiac surgery rehab protocol was placed in transport envelope. Envelope was placed with pt's soft chart. Expand C is following pt. Cm/sw will follow.

## 2025-04-23 NOTE — PATIENT CARE CONFERENCE
P Quality Flow/Interdisciplinary Rounds Progress Note        Quality Flow Rounds held on April 23, 2025    Disciplines Attending:  Bedside Nurse, , , and Nursing Unit Leadership    Jerome Steel was admitted on 4/17/2025  4:54 AM    Anticipated Discharge Date:       Disposition:    Maik Score:  Maik Scale Score: 16    BS RISK OF UNPLANNED READMISSION 2.0             25.7 Total Score        Discussed patient goal for the day, patient clinical progression, and barriers to discharge.  The following Goal(s) of the Day/Commitment(s) have been identified:  discharge plan      Susan Campbell RN  April 23, 2025

## 2025-04-24 VITALS
HEIGHT: 70 IN | WEIGHT: 164.8 LBS | DIASTOLIC BLOOD PRESSURE: 68 MMHG | TEMPERATURE: 98.2 F | HEART RATE: 79 BPM | SYSTOLIC BLOOD PRESSURE: 102 MMHG | RESPIRATION RATE: 18 BRPM | BODY MASS INDEX: 23.59 KG/M2 | OXYGEN SATURATION: 97 %

## 2025-04-24 LAB
ALBUMIN SERPL-MCNC: 3.1 G/DL (ref 3.5–5.2)
ALP SERPL-CCNC: 154 U/L (ref 40–129)
ALT SERPL-CCNC: 22 U/L (ref 0–50)
ANION GAP SERPL CALCULATED.3IONS-SCNC: 10 MMOL/L (ref 7–16)
AST SERPL-CCNC: 42 U/L (ref 0–50)
BILIRUB SERPL-MCNC: 0.4 MG/DL (ref 0–1.2)
BUN SERPL-MCNC: 21 MG/DL (ref 8–23)
CALCIUM SERPL-MCNC: 9 MG/DL (ref 8.8–10.2)
CHLORIDE SERPL-SCNC: 101 MMOL/L (ref 98–107)
CO2 SERPL-SCNC: 24 MMOL/L (ref 22–29)
CREAT SERPL-MCNC: 0.7 MG/DL (ref 0.7–1.2)
ERYTHROCYTE [DISTWIDTH] IN BLOOD BY AUTOMATED COUNT: 17.5 % (ref 11.5–15)
GFR, ESTIMATED: >90 ML/MIN/1.73M2
GLUCOSE BLD-MCNC: 143 MG/DL (ref 74–99)
GLUCOSE BLD-MCNC: 166 MG/DL (ref 74–99)
GLUCOSE SERPL-MCNC: 136 MG/DL (ref 74–99)
HCT VFR BLD AUTO: 26.1 % (ref 37–54)
HGB BLD-MCNC: 8.7 G/DL (ref 12.5–16.5)
MAGNESIUM SERPL-MCNC: 2.1 MG/DL (ref 1.6–2.4)
MCH RBC QN AUTO: 29.7 PG (ref 26–35)
MCHC RBC AUTO-ENTMCNC: 33.3 G/DL (ref 32–34.5)
MCV RBC AUTO: 89.1 FL (ref 80–99.9)
PLATELET # BLD AUTO: 370 K/UL (ref 130–450)
PMV BLD AUTO: 9.1 FL (ref 7–12)
POTASSIUM SERPL-SCNC: 4.4 MMOL/L (ref 3.5–5.1)
PROT SERPL-MCNC: 6.4 G/DL (ref 6.4–8.3)
RBC # BLD AUTO: 2.93 M/UL (ref 3.8–5.8)
SODIUM SERPL-SCNC: 134 MMOL/L (ref 136–145)
WBC OTHER # BLD: 7.7 K/UL (ref 4.5–11.5)

## 2025-04-24 PROCEDURE — 82962 GLUCOSE BLOOD TEST: CPT

## 2025-04-24 PROCEDURE — 6370000000 HC RX 637 (ALT 250 FOR IP): Performed by: NURSE PRACTITIONER

## 2025-04-24 PROCEDURE — 83735 ASSAY OF MAGNESIUM: CPT

## 2025-04-24 PROCEDURE — 99232 SBSQ HOSP IP/OBS MODERATE 35: CPT | Performed by: INTERNAL MEDICINE

## 2025-04-24 PROCEDURE — 80053 COMPREHEN METABOLIC PANEL: CPT

## 2025-04-24 PROCEDURE — 85027 COMPLETE CBC AUTOMATED: CPT

## 2025-04-24 PROCEDURE — 36415 COLL VENOUS BLD VENIPUNCTURE: CPT

## 2025-04-24 PROCEDURE — 6360000002 HC RX W HCPCS: Performed by: NURSE PRACTITIONER

## 2025-04-24 PROCEDURE — 93798 PHYS/QHP OP CAR RHAB W/ECG: CPT

## 2025-04-24 PROCEDURE — 94669 MECHANICAL CHEST WALL OSCILL: CPT

## 2025-04-24 PROCEDURE — 94640 AIRWAY INHALATION TREATMENT: CPT

## 2025-04-24 PROCEDURE — 2500000003 HC RX 250 WO HCPCS: Performed by: NURSE PRACTITIONER

## 2025-04-24 PROCEDURE — 6370000000 HC RX 637 (ALT 250 FOR IP): Performed by: INTERNAL MEDICINE

## 2025-04-24 RX ORDER — INSULIN LISPRO 100 [IU]/ML
0-6 INJECTION, SOLUTION INTRAVENOUS; SUBCUTANEOUS
DISCHARGE
Start: 2025-04-24

## 2025-04-24 RX ORDER — INSULIN GLARGINE 100 [IU]/ML
5 INJECTION, SOLUTION SUBCUTANEOUS NIGHTLY
DISCHARGE
Start: 2025-04-24

## 2025-04-24 RX ORDER — GABAPENTIN 300 MG/1
300 CAPSULE ORAL 2 TIMES DAILY
Status: DISCONTINUED | OUTPATIENT
Start: 2025-04-24 | End: 2025-04-24 | Stop reason: HOSPADM

## 2025-04-24 RX ORDER — METOPROLOL SUCCINATE 25 MG/1
12.5 TABLET, EXTENDED RELEASE ORAL DAILY
Status: DISCONTINUED | OUTPATIENT
Start: 2025-04-24 | End: 2025-04-24 | Stop reason: HOSPADM

## 2025-04-24 RX ADMIN — COLLAGENASE SANTYL: 250 OINTMENT TOPICAL at 08:48

## 2025-04-24 RX ADMIN — FERROUS SULFATE TAB 325 MG (65 MG ELEMENTAL FE) 325 MG: 325 (65 FE) TAB at 08:46

## 2025-04-24 RX ADMIN — Medication 400 MG: at 08:45

## 2025-04-24 RX ADMIN — CLOPIDOGREL BISULFATE 75 MG: 75 TABLET, FILM COATED ORAL at 08:46

## 2025-04-24 RX ADMIN — GABAPENTIN 300 MG: 300 CAPSULE ORAL at 08:46

## 2025-04-24 RX ADMIN — OXYCODONE HYDROCHLORIDE 5 MG: 5 TABLET ORAL at 13:40

## 2025-04-24 RX ADMIN — TAMSULOSIN HYDROCHLORIDE 0.4 MG: 0.4 CAPSULE ORAL at 08:45

## 2025-04-24 RX ADMIN — METOPROLOL SUCCINATE 12.5 MG: 25 TABLET, EXTENDED RELEASE ORAL at 13:40

## 2025-04-24 RX ADMIN — OXYCODONE HYDROCHLORIDE 5 MG: 5 TABLET ORAL at 00:56

## 2025-04-24 RX ADMIN — FOLIC ACID 1 MG: 1 TABLET ORAL at 08:45

## 2025-04-24 RX ADMIN — INSULIN LISPRO 1 UNITS: 100 INJECTION, SOLUTION INTRAVENOUS; SUBCUTANEOUS at 12:18

## 2025-04-24 RX ADMIN — ENOXAPARIN SODIUM 40 MG: 100 INJECTION SUBCUTANEOUS at 08:45

## 2025-04-24 RX ADMIN — OXYCODONE HYDROCHLORIDE 5 MG: 5 TABLET ORAL at 05:00

## 2025-04-24 RX ADMIN — Medication 500 MG: at 08:46

## 2025-04-24 RX ADMIN — ASPIRIN 81 MG: 81 TABLET, COATED ORAL at 08:46

## 2025-04-24 RX ADMIN — OXYCODONE HYDROCHLORIDE 5 MG: 5 TABLET ORAL at 09:12

## 2025-04-24 RX ADMIN — SODIUM CHLORIDE, PRESERVATIVE FREE 10 ML: 5 INJECTION INTRAVENOUS at 02:28

## 2025-04-24 RX ADMIN — IPRATROPIUM BROMIDE AND ALBUTEROL SULFATE 1 DOSE: 2.5; .5 SOLUTION RESPIRATORY (INHALATION) at 13:02

## 2025-04-24 RX ADMIN — SODIUM CHLORIDE, PRESERVATIVE FREE 10 ML: 5 INJECTION INTRAVENOUS at 08:45

## 2025-04-24 RX ADMIN — PANTOPRAZOLE SODIUM 40 MG: 40 TABLET, DELAYED RELEASE ORAL at 05:00

## 2025-04-24 ASSESSMENT — PAIN DESCRIPTION - DESCRIPTORS
DESCRIPTORS: ACHING;DISCOMFORT;DULL
DESCRIPTORS: ACHING;DISCOMFORT;SORE
DESCRIPTORS: ACHING;DISCOMFORT;SORE
DESCRIPTORS: ACHING;DISCOMFORT;DULL

## 2025-04-24 ASSESSMENT — PAIN SCALES - GENERAL
PAINLEVEL_OUTOF10: 8
PAINLEVEL_OUTOF10: 5
PAINLEVEL_OUTOF10: 8
PAINLEVEL_OUTOF10: 8
PAINLEVEL_OUTOF10: 0

## 2025-04-24 ASSESSMENT — PAIN DESCRIPTION - LOCATION
LOCATION: CHEST;INCISION
LOCATION: CHEST
LOCATION: INCISION

## 2025-04-24 ASSESSMENT — PAIN - FUNCTIONAL ASSESSMENT: PAIN_FUNCTIONAL_ASSESSMENT: ACTIVITIES ARE NOT PREVENTED

## 2025-04-24 ASSESSMENT — PAIN DESCRIPTION - ORIENTATION
ORIENTATION: MID

## 2025-04-24 NOTE — CARE COORDINATION
Transition of care update: Discharge order on chart. POD#7 CABG x 2. On RA. Pre cert was obtained for [facility]). Spoek with Caty at [facility] and pt is setup for 3pm . Notified Matilda with [facility], patient, pt's nurse of  time. Called pt's brother, Tl, and notified him of discharge today and transport time to [facility]. EXEMPT completed. Cardiac surgery rehab protocol was placed in transport envelope. Ambulance form completed. Transport envelope is with pt's soft chart.

## 2025-04-24 NOTE — PLAN OF CARE
Problem: Chronic Conditions and Co-morbidities  Goal: Patient's chronic conditions and co-morbidity symptoms are monitored and maintained or improved  4/17/2025 1350 by Erik Bo RN  Outcome: Progressing  4/17/2025 1349 by Erik Bo RN  Outcome: Progressing     Problem: Discharge Planning  Goal: Discharge to home or other facility with appropriate resources  4/17/2025 1350 by Erik Bo RN  Outcome: Progressing  4/17/2025 1349 by Erik Bo RN  Outcome: Progressing     Problem: Pain  Goal: Verbalizes/displays adequate comfort level or baseline comfort level  4/17/2025 1350 by Erik Bo RN  Outcome: Progressing  4/17/2025 1349 by Erik Bo RN  Outcome: Progressing     Problem: Safety - Adult  Goal: Free from fall injury  4/17/2025 1350 by Erik Bo RN  Outcome: Progressing  4/17/2025 1349 by Erki Bo RN  Outcome: Progressing     Problem: Skin/Tissue Integrity  Goal: Skin integrity remains intact  Description: 1.  Monitor for areas of redness and/or skin breakdown2.  Assess vascular access sites hourly3.  Every 4-6 hours minimum:  Change oxygen saturation probe site4.  Every 4-6 hours:  If on nasal continuous positive airway pressure, respiratory therapy assess nares and determine need for appliance change or resting period  4/17/2025 1350 by Erik Bo RN  Outcome: Progressing  4/17/2025 1349 by Erik Bo RN  Outcome: Progressing     Problem: ABCDS Injury Assessment  Goal: Absence of physical injury  4/17/2025 1350 by Erik Bo RN  Outcome: Progressing  4/17/2025 1349 by Erik Bo RN  Outcome: Progressing     
  Problem: Chronic Conditions and Co-morbidities  Goal: Patient's chronic conditions and co-morbidity symptoms are monitored and maintained or improved  4/17/2025 2016 by Edna Rosario RN  Outcome: Progressing  Flowsheets (Taken 4/17/2025 2016)  Care Plan - Patient's Chronic Conditions and Co-Morbidity Symptoms are Monitored and Maintained or Improved:   Collaborate with multidisciplinary team to address chronic and comorbid conditions and prevent exacerbation or deterioration   Monitor and assess patient's chronic conditions and comorbid symptoms for stability, deterioration, or improvement  4/17/2025 1350 by Erik Bo RN  Outcome: Progressing  4/17/2025 1349 by Erik Bo RN  Outcome: Progressing     Problem: Discharge Planning  Goal: Discharge to home or other facility with appropriate resources  4/17/2025 2016 by Edna Rosario RN  Outcome: Progressing  Flowsheets (Taken 4/17/2025 2016)  Discharge to home or other facility with appropriate resources: Identify barriers to discharge with patient and caregiver  4/17/2025 1350 by Erik Bo RN  Outcome: Progressing  4/17/2025 1349 by Erik Bo RN  Outcome: Progressing     Problem: Pain  Goal: Verbalizes/displays adequate comfort level or baseline comfort level  4/17/2025 2016 by Edna Rosario RN  Outcome: Progressing  Flowsheets (Taken 4/17/2025 2016)  Verbalizes/displays adequate comfort level or baseline comfort level:   Encourage patient to monitor pain and request assistance   Administer analgesics based on type and severity of pain and evaluate response  4/17/2025 1350 by Erik Bo RN  Outcome: Progressing  4/17/2025 1349 by Erik Bo RN  Outcome: Progressing     Problem: Safety - Adult  Goal: Free from fall injury  4/17/2025 2016 by Edna Rosario RN  Outcome: Progressing  Flowsheets (Taken 4/17/2025 2016)  Free From Fall Injury: Instruct family/caregiver on patient safety  4/17/2025 1350 by Erik Bo 
  Problem: Chronic Conditions and Co-morbidities  Goal: Patient's chronic conditions and co-morbidity symptoms are monitored and maintained or improved  4/19/2025 0752 by Celia Marie RN  Outcome: Progressing     Problem: Discharge Planning  Goal: Discharge to home or other facility with appropriate resources  4/19/2025 0752 by Celia Marie RN  Outcome: Progressing     Problem: Pain  Goal: Verbalizes/displays adequate comfort level or baseline comfort level  4/19/2025 0752 by Celia Marie RN  Outcome: Progressing     Problem: Safety - Adult  Goal: Free from fall injury  4/19/2025 0752 by Celia Marie RN  Outcome: Progressing     Problem: Skin/Tissue Integrity  Goal: Skin integrity remains intact  Description: 1.  Monitor for areas of redness and/or skin breakdown2.  Assess vascular access sites hourly3.  Every 4-6 hours minimum:  Change oxygen saturation probe site4.  Every 4-6 hours:  If on nasal continuous positive airway pressure, respiratory therapy assess nares and determine need for appliance change or resting period  4/19/2025 0752 by Celia Marie RN  Outcome: Progressing     
  Problem: Chronic Conditions and Co-morbidities  Goal: Patient's chronic conditions and co-morbidity symptoms are monitored and maintained or improved  4/20/2025 1102 by Mandy Gaming RN  Outcome: Progressing     Problem: Discharge Planning  Goal: Discharge to home or other facility with appropriate resources  Outcome: Progressing     Problem: Pain  Goal: Verbalizes/displays adequate comfort level or baseline comfort level  4/20/2025 1102 by Mandy Gaming RN  Outcome: Progressing     Problem: Safety - Adult  Goal: Free from fall injury  4/20/2025 1102 by Mandy Gaming RN  Outcome: Progressing     Problem: Skin/Tissue Integrity  Goal: Skin integrity remains intact  Description: 1.  Monitor for areas of redness and/or skin breakdown2.  Assess vascular access sites hourly3.  Every 4-6 hours minimum:  Change oxygen saturation probe site4.  Every 4-6 hours:  If on nasal continuous positive airway pressure, respiratory therapy assess nares and determine need for appliance change or resting period  4/20/2025 1102 by Mandy Gaming RN  Outcome: Progressing     Problem: ABCDS Injury Assessment  Goal: Absence of physical injury  4/20/2025 1102 by Mandy Gaming RN  Outcome: Progressing     Problem: Nutrition Deficit:  Goal: Optimize nutritional status  Outcome: Progressing     
  Problem: Chronic Conditions and Co-morbidities  Goal: Patient's chronic conditions and co-morbidity symptoms are monitored and maintained or improved  4/22/2025 0134 by Sanna Koch RN  Outcome: Progressing     Problem: Pain  Goal: Verbalizes/displays adequate comfort level or baseline comfort level  4/22/2025 0134 by Sanna Koch RN  Outcome: Progressing     Problem: Safety - Adult  Goal: Free from fall injury  4/22/2025 0134 by Sanna Koch RN  Outcome: Progressing     Problem: Skin/Tissue Integrity  Goal: Skin integrity remains intact  Description: 1.  Monitor for areas of redness and/or skin breakdown2.  Assess vascular access sites hourly3.  Every 4-6 hours minimum:  Change oxygen saturation probe site4.  Every 4-6 hours:  If on nasal continuous positive airway pressure, respiratory therapy assess nares and determine need for appliance change or resting period  4/22/2025 0134 by Sanna Koch RN  Outcome: Progressing     Problem: ABCDS Injury Assessment  Goal: Absence of physical injury  4/22/2025 0134 by Sanna Koch RN  Outcome: Progressing     
  Problem: Chronic Conditions and Co-morbidities  Goal: Patient's chronic conditions and co-morbidity symptoms are monitored and maintained or improved  4/22/2025 1457 by Alida Robles RN  Outcome: Progressing  4/22/2025 1457 by Alida Robles RN  Outcome: Progressing  Flowsheets (Taken 4/22/2025 0344)  Care Plan - Patient's Chronic Conditions and Co-Morbidity Symptoms are Monitored and Maintained or Improved:   Monitor and assess patient's chronic conditions and comorbid symptoms for stability, deterioration, or improvement   Collaborate with multidisciplinary team to address chronic and comorbid conditions and prevent exacerbation or deterioration  4/22/2025 0134 by Sanna Koch RN  Outcome: Progressing     Problem: Discharge Planning  Goal: Discharge to home or other facility with appropriate resources  4/22/2025 1457 by Alida Robles RN  Outcome: Progressing  4/22/2025 1457 by Alida Robles RN  Outcome: Progressing  Flowsheets (Taken 4/22/2025 0344)  Discharge to home or other facility with appropriate resources: Identify barriers to discharge with patient and caregiver     Problem: Pain  Goal: Verbalizes/displays adequate comfort level or baseline comfort level  4/22/2025 1457 by Alida Robles RN  Outcome: Progressing  4/22/2025 1457 by Alida Robles RN  Outcome: Progressing  4/22/2025 0134 by Sanna Koch RN  Outcome: Progressing     Problem: Safety - Adult  Goal: Free from fall injury  4/22/2025 1457 by Alida Robles RN  Outcome: Progressing  4/22/2025 1457 by Alida Robles RN  Outcome: Progressing  4/22/2025 0134 by Sanna Koch RN  Outcome: Progressing     
  Problem: Chronic Conditions and Co-morbidities  Goal: Patient's chronic conditions and co-morbidity symptoms are monitored and maintained or improved  4/23/2025 0446 by Sanna Koch RN  Outcome: Progressing     Problem: Pain  Goal: Verbalizes/displays adequate comfort level or baseline comfort level  4/23/2025 0446 by Sanna Koch RN  Outcome: Progressing     Problem: Safety - Adult  Goal: Free from fall injury  4/23/2025 0446 by Sanna Koch RN  Outcome: Progressing     Problem: Skin/Tissue Integrity  Goal: Skin integrity remains intact  Description: 1.  Monitor for areas of redness and/or skin breakdown2.  Assess vascular access sites hourly3.  Every 4-6 hours minimum:  Change oxygen saturation probe site4.  Every 4-6 hours:  If on nasal continuous positive airway pressure, respiratory therapy assess nares and determine need for appliance change or resting period  4/23/2025 0446 by Sanna Koch RN  Outcome: Progressing     Problem: ABCDS Injury Assessment  Goal: Absence of physical injury  4/23/2025 0446 by Sanna Koch RN  Outcome: Progressing     
  Problem: Chronic Conditions and Co-morbidities  Goal: Patient's chronic conditions and co-morbidity symptoms are monitored and maintained or improved  4/23/2025 1255 by Jaclyn Lam RN  Outcome: Progressing     Problem: Discharge Planning  Goal: Discharge to home or other facility with appropriate resources  Outcome: Progressing     Problem: Pain  Goal: Verbalizes/displays adequate comfort level or baseline comfort level  4/23/2025 1255 by Jaclyn Lam RN  Outcome: Progressing     
  Problem: Chronic Conditions and Co-morbidities  Goal: Patient's chronic conditions and co-morbidity symptoms are monitored and maintained or improved  Outcome: Progressing     Problem: Discharge Planning  Goal: Discharge to home or other facility with appropriate resources  Outcome: Progressing     Problem: Pain  Goal: Verbalizes/displays adequate comfort level or baseline comfort level  Outcome: Progressing     Problem: Safety - Adult  Goal: Free from fall injury  Outcome: Progressing     Problem: Skin/Tissue Integrity  Goal: Skin integrity remains intact  Description: 1.  Monitor for areas of redness and/or skin breakdown2.  Assess vascular access sites hourly3.  Every 4-6 hours minimum:  Change oxygen saturation probe site4.  Every 4-6 hours:  If on nasal continuous positive airway pressure, respiratory therapy assess nares and determine need for appliance change or resting period  Outcome: Progressing     Problem: Nutrition Deficit:  Goal: Optimize nutritional status  Outcome: Progressing     
  Problem: Chronic Conditions and Co-morbidities  Goal: Patient's chronic conditions and co-morbidity symptoms are monitored and maintained or improved  Outcome: Progressing     Problem: Pain  Goal: Verbalizes/displays adequate comfort level or baseline comfort level  Outcome: Progressing     Problem: Safety - Adult  Goal: Free from fall injury  Outcome: Progressing     Problem: Skin/Tissue Integrity  Goal: Skin integrity remains intact  Description: 1.  Monitor for areas of redness and/or skin breakdown2.  Assess vascular access sites hourly3.  Every 4-6 hours minimum:  Change oxygen saturation probe site4.  Every 4-6 hours:  If on nasal continuous positive airway pressure, respiratory therapy assess nares and determine need for appliance change or resting period  Outcome: Progressing     Problem: ABCDS Injury Assessment  Goal: Absence of physical injury  Outcome: Progressing     
  Problem: Chronic Conditions and Co-morbidities  Goal: Patient's chronic conditions and co-morbidity symptoms are monitored and maintained or improved  Outcome: Progressing     Problem: Pain  Goal: Verbalizes/displays adequate comfort level or baseline comfort level  Outcome: Progressing     Problem: Safety - Adult  Goal: Free from fall injury  Outcome: Progressing     Problem: Skin/Tissue Integrity  Goal: Skin integrity remains intact  Description: 1.  Monitor for areas of redness and/or skin breakdown2.  Assess vascular access sites hourly3.  Every 4-6 hours minimum:  Change oxygen saturation probe site4.  Every 4-6 hours:  If on nasal continuous positive airway pressure, respiratory therapy assess nares and determine need for appliance change or resting period  Outcome: Progressing     Problem: ABCDS Injury Assessment  Goal: Absence of physical injury  Outcome: Progressing     
  Problem: Chronic Conditions and Co-morbidities  Goal: Patient's chronic conditions and co-morbidity symptoms are monitored and maintained or improved  Outcome: Progressing  Flowsheets (Taken 4/18/2025 2035)  Care Plan - Patient's Chronic Conditions and Co-Morbidity Symptoms are Monitored and Maintained or Improved: Monitor and assess patient's chronic conditions and comorbid symptoms for stability, deterioration, or improvement     Problem: Discharge Planning  Goal: Discharge to home or other facility with appropriate resources  Outcome: Progressing  Flowsheets (Taken 4/18/2025 2035)  Discharge to home or other facility with appropriate resources: Identify barriers to discharge with patient and caregiver     Problem: Pain  Goal: Verbalizes/displays adequate comfort level or baseline comfort level  Outcome: Progressing     Problem: Safety - Adult  Goal: Free from fall injury  Outcome: Progressing     Problem: Skin/Tissue Integrity  Goal: Skin integrity remains intact  Description: 1.  Monitor for areas of redness and/or skin breakdown2.  Assess vascular access sites hourly3.  Every 4-6 hours minimum:  Change oxygen saturation probe site4.  Every 4-6 hours:  If on nasal continuous positive airway pressure, respiratory therapy assess nares and determine need for appliance change or resting period  Outcome: Progressing  Flowsheets (Taken 4/18/2025 2035)  Skin Integrity Remains Intact: Monitor for areas of redness and/or skin breakdown     Problem: ABCDS Injury Assessment  Goal: Absence of physical injury  Outcome: Progressing     
  Problem: Chronic Conditions and Co-morbidities  Goal: Patient's chronic conditions and co-morbidity symptoms are monitored and maintained or improved  Outcome: Progressing  Flowsheets (Taken 4/21/2025 0830 by Alida Robles, RN)  Care Plan - Patient's Chronic Conditions and Co-Morbidity Symptoms are Monitored and Maintained or Improved:   Monitor and assess patient's chronic conditions and comorbid symptoms for stability, deterioration, or improvement   Collaborate with multidisciplinary team to address chronic and comorbid conditions and prevent exacerbation or deterioration     Problem: Discharge Planning  Goal: Discharge to home or other facility with appropriate resources  Outcome: Progressing  Flowsheets (Taken 4/21/2025 0830 by Alida Robles, RN)  Discharge to home or other facility with appropriate resources: Identify barriers to discharge with patient and caregiver     Problem: Pain  Goal: Verbalizes/displays adequate comfort level or baseline comfort level  Outcome: Progressing     Problem: Safety - Adult  Goal: Free from fall injury  Outcome: Progressing     Problem: Skin/Tissue Integrity  Goal: Skin integrity remains intact  Description: 1.  Monitor for areas of redness and/or skin breakdown2.  Assess vascular access sites hourly3.  Every 4-6 hours minimum:  Change oxygen saturation probe site4.  Every 4-6 hours:  If on nasal continuous positive airway pressure, respiratory therapy assess nares and determine need for appliance change or resting period  Outcome: Progressing  Flowsheets (Taken 4/21/2025 0830 by Alida Robles, RN)  Skin Integrity Remains Intact: Monitor for areas of redness and/or skin breakdown     Problem: ABCDS Injury Assessment  Goal: Absence of physical injury  Outcome: Progressing  Flowsheets (Taken 4/21/2025 0830 by Alida Robles, RN)  Absence of Physical Injury: Implement safety measures based on patient assessment     Problem: Nutrition Deficit:  Goal: Optimize nutritional 
vascular access sites hourly3.  Every 4-6 hours minimum:  Change oxygen saturation probe site4.  Every 4-6 hours:  If on nasal continuous positive airway pressure, respiratory therapy assess nares and determine need for appliance change or resting period  4/18/2025 0928 by Erik Bo, RN  Outcome: Progressing  4/17/2025 2016 by Edna Rosario, RN  Outcome: Progressing  Flowsheets (Taken 4/17/2025 2016)  Skin Integrity Remains Intact:   Monitor for areas of redness and/or skin breakdown   Assess vascular access sites hourly

## 2025-04-24 NOTE — PATIENT CARE CONFERENCE
P Quality Flow/Interdisciplinary Rounds Progress Note        Quality Flow Rounds held on April 24, 2025    Disciplines Attending:  Bedside Nurse, , , and Nursing Unit Leadership    Jerome Steel was admitted on 4/17/2025  4:54 AM    Anticipated Discharge Date:       Disposition:    Maik Score:  Maik Scale Score: 18    BS RISK OF UNPLANNED READMISSION 2.0             27.2 Total Score        Discussed patient goal for the day, patient clinical progression, and barriers to discharge.  The following Goal(s) of the Day/Commitment(s) have been identified:  discharge planning       Susan Campbell RN  April 24, 2025

## 2025-04-25 NOTE — PROGRESS NOTES
[facility]   PRE-ADMISSION TESTING GENERAL INSTRUCTIONS  PAT Phone Number: [phone]      GENERAL INSTRUCTIONS:    [x] Hibiclens shower the night before AND the morning of surgery.   -Do not use Hibiclens on your face or head.  [x] Do not wear makeup, lotions, powders, deodorant the morning of surgery.  [x] Nothing to eat or drink after midnight. This includes no gum, candy, mints or water.  [x] You may brush your teeth, gargle, but do NOT swallow water.   [x] No tobacco products, illegal drugs, or alcohol within 24 hours of your surgery.  [x] Jewelry or valuables should not be brought to the hospital. All body and/or tongue piercing's must be removed prior to arriving to hospital. No contact lens or hair pins.   [x] Bring insurance card and photo ID.  [x] Transfusion (Green) Bracelet: Please bring with you to hospital, day of surgery.     PARKING INSTRUCTIONS:     [x] ARRIVAL DATE & TIME: 4/17 AT 5 AM  [x] Times are subject to change. We will contact you the business day before surgery if that were to occur.  [x] Enter into the [facility] Entrance. Two people may accompany you. Masks are not required.  [x] Parking Lot \"I\" is where you will park. It is located on the corner of [address] and [address]. The entrance is on [address].   Only one vehicle - per patient, is permitted in parking lot.   Upon entering the parking lot, a voucher ticket will print.    EDUCATION INSTRUCTIONS:           [x] Pre-admission Testing educational folder given  [x] Incentive Spirometry,coughing & deep breathing exercises reviewed.   [x] Fluoroscopy-Xray used in surgery reviewed with patient. Educational pamphlet placed in chart.  [x] Pain: Post-op pain is normal and to be expected. You will be asked to rate your pain from 0-10.  [x] Follow instruction sheet for Ensure/ Protein drinks - provided to you during PAT apt.    MEDICATION INSTRUCTIONS:    [x] Bring a complete list of your 
  [facility] Quality Flow/Interdisciplinary Rounds Progress Note        Quality Flow Rounds held on April 21, 2025    Disciplines Attending:  Bedside Nurse, , , and Nursing Unit Leadership    Jerome Steel was admitted on 4/17/2025  4:54 AM    Anticipated Discharge Date:       Disposition:    Maik Score:  Maik Scale Score: 14    BS RISK OF UNPLANNED READMISSION 2.0             26.4 Total Score        Discussed patient goal for the day, patient clinical progression, and barriers to discharge.  The following Goal(s) of the Day/Commitment(s) have been identified:  Diagnostics - Report Results and Labs - Report Results      Shaina Cuevas RN  April 21, 2025        
  OCCUPATIONAL THERAPY INITIAL EVALUATION    [facility]  [address], [place]       Date:2025                                                               Patient Name: Jerome Steel  MRN: [id number]  : 1960  Room: [address] [place] [address]       Evaluating OT: Kalee Stanton OTR/L; [id number]     Referring Provider: Sally Esqueda PA    Specific Provider Orders/Date: OT eval and treat (25 1045)     Diagnosis: Coronary artery disease involving native coronary artery of native heart without angina pectoris [I25.10]  Coronary artery disease, unspecified vessel or lesion type, unspecified whether angina present, unspecified whether native or transplanted heart [I25.10]     Reason for admission: Pt admitted for surgical intervention of CAD.    Surgery/Procedures:    25 Sternotomy/Pump assist CABG x 2 (LIMA-LAD, SVG-OM2)/MAHAD exclusion with 40mm AtriClip/Posterior pericardotomy/EVH LLE/Rigid internal fixation of the sternum with [place] plates x 2/22 modifier.    Pertinent Medical History:   Past Medical History:   Diagnosis Date    Abscess of prostate     Anemia     Arthritis     Atherosclerotic heart disease of native coronary artery without angina pectoris     Bilateral carpal tunnel syndrome     Cerebral artery occlusion with cerebral infarction ([facility])     Chronic back pain     COPD (chronic obstructive pulmonary disease) ([facility])     Coronary artery disease involving native coronary artery of native heart without angina pectoris 2022    CVA (cerebral vascular accident) ([facility]) 2015, 2017    Diabetes mellitus ([facility])     Type 2    Diabetic foot ulcer with osteomyelitis ([facility]) 2021    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 2 diabetes mellitus, with necrosis of bone 2021    Disease of multiple valves of heart     Dysphagia     EtOH dependence ([facility])     Falls     GERD (gastroesophageal reflux disease)     
  Physician Progress Note      PATIENT:               LUCIO ORELLANA  CSN #:                  [id number]  :                       1960  ADMIT DATE:       2025 4:54 AM  DISCH DATE:        2025 4:22 PM  RESPONDING  PROVIDER #:        DOMINIC PRICE - SHIELA          QUERY TEXT:    Please clarify the patient?s nutritional status:    The clinical indicators include:  -Dietician consult: Moderate malnutrition, in context of chronic illness   related to inadequate protein-energy intake (hx of prior ETOH abuse) as   evidenced by poor intake prior to admission, loss of subcutaneous fat, muscle   loss (, Neo Hendricks, LINO, LD)  -Energy Intake:  75% or less estimated energy requirements for 1 month or   longer. Weight Loss:  Unable to assess (d/t possible fluid shifts ; limited   actual weight history). Body Fat Loss:   (moderate) Orbital, Triceps. Muscle   Mass Loss:   (moderate) Temples (temporalis), Clavicles (pectoralis &   deltoids). Fluid Accumulation:  No fluid accumulation.  Strength:  Not   Performed  (, Neo Hendricks, LINO, LD)  -Food and/or Nutrient Delivery: Continue Current Diet, Continue Oral Nutrition   Supplement, Modify Oral Nutrition Supplement (, Neo Hendricks, LINO, LD)  Options provided:  -- Protein calorie malnutrition moderate  -- Other - I will add my own diagnosis  -- Disagree - Not applicable / Not valid  -- Disagree - Clinically unable to determine / Unknown  -- Refer to Clinical Documentation Reviewer    PROVIDER RESPONSE TEXT:    Provider disagreed with this query.  The patient was in a postoperative state and receiving adequate nutrition for   that particular time    Query created by: Lauren Archibald on 2025 8:08 AM      Electronically signed by:  DOMINIC Franklin CNP 2025 9:58 AM          
4 Eyes Skin Assessment     NAME:  Jerome Steel  YOB: 1960  MEDICAL RECORD NUMBER:  [id number]    The patient is being assessed for  Admission    I agree that at least one RN has performed a thorough Head to Toe Skin Assessment on the patient. ALL assessment sites listed below have been assessed.      Areas assessed by both nurses:    Head, Face, Ears, Shoulders, Back, Chest, Arms, Elbows, Hands, Sacrum. Buttock, Coccyx, Ischium, Legs. Feet and Heels, and Under Medical Devices         Does the Patient have a Wound? Yes wound(s) were present on assessment. LDA wound assessment was Initiated and completed by RN       Maik Prevention initiated by RN: Yes  Wound Care Orders initiated by RN: Yes    Pressure Injury (Stage 3,4, Unstageable, DTI, NWPT, and Complex wounds) if present, place Wound referral order by RN under : No    New Ostomies, if present place, Ostomy referral order under : No     Nurse 1 eSignature: Electronically signed by Celia Marie RN on 4/18/25 at 7:01 PM EDT    **SHARE this note so that the co-signing nurse can place an eSignature**    Nurse 2 eSignature: Electronically signed by Jaclyn Lam RN on 4/18/25 at 7:29 PM EDT  
CVICU Admission Note    Name: Jerome Steel  MRN: [id number]    CC: Postoperative Critical Care Management     Indication for Surgery/Procedure: CAD, critical LM not amendable to PCI   LVEF:  Normal    RVF:  Normal     Important/Relevant PMH/PSH: Aortic stenosis with likely plan for TAVR in future, HTN, HPL, hypothyroid, PVD with arthrectomies and arthroplasties, right carotid artery stenosis (50-69%), right heel diabetic foot ulcer, DM, hypothyroidism, ETOH abuse, CT abdomen with cirrhosis of the liver, CVA 2015 with residual right hemiparesis, SAH 2024, psychosis and schizophrenia, prostate abscess with chronic urinary catheter, GERD, PUD with prior GI bleed, right inguinal incarcerated hernia     Procedure/Surgeries: 4/17/2025 Sternotomy/Pump assist CABG x 2 (LIMA-LAD, SVG-OM2)/MAHAD exclusion with 40mm AtriClip/Posterior pericardotomy/EVH LLE/Rigid internal fixation of the sternum with [place] plates x 2/22 modifier     Pacing wires: Ventricular       Physical Exam:    BP (!) 90/43   Pulse 70   Temp 97.7 °F (36.5 °C) (Temporal)   Resp 12   Ht 1.778 m (5' 10\")   Wt 75.3 kg (166 lb)   SpO2 100%   BMI 23.82 kg/m²     No results for input(s): \"WBC\", \"RBC\", \"HGB\", \"HCT\", \"MCV\", \"MCH\", \"MCHC\", \"RDW\", \"PLT\", \"MPV\" in the last 72 hours.  Recent Labs     04/17/25  0541 04/17/25  0844 04/17/25  0958   *  --   --    K 4.7  --   --      --   --    CO2 20*  --   --    BUN 11  --   --    CREATININE 0.6*   < > 0.6*   GLUCOSE 133*  --   --    CALCIUM 9.4  --   --     < > = values in this interval not displayed.         Post operative CXR:        Atelectasis, No pneumothorax noted, Mildly increased vascular markings bilateral, No significant pleural effusion. ETT/lines/drains appear to be in proper position.  Final Radiology report pending.       General Appearance: Arrived to ICU intubated on ventilator. Mild hypotension, albumin infusing.   Eyes: PERRL  Pulmonary: Diminished bibasilar.  No wheezes, no 
Chest tube(s) removed without difficulty. Patient tolerated well    Epicardial pacing wires cut without difficulty. Patient tolerated well    
Comprehensive Nutrition Assessment    Type and Reason for Visit:  Initial, Consult    Nutrition Recommendations/Plan:   Recommend and start Glucerna supplement TID and Dallin wound healing supplement BID to help meet increased nutritional needs from surgical wound healing.           Malnutrition Assessment:  Malnutrition Status:  Moderate malnutrition (04/19/25 0916)    Context:  Chronic Illness     Findings of the 6 clinical characteristics of malnutrition:  Energy Intake:  75% or less estimated energy requirements for 1 month or longer  Weight Loss:  Unable to assess (d/t possible fluid shifts ; limited actual weight history)     Body Fat Loss:   (moderate) Orbital, Triceps   Muscle Mass Loss:   (moderate) Temples (temporalis), Clavicles (pectoralis & deltoids)  Fluid Accumulation:  No fluid accumulation     Strength:  Not Performed    Nutrition Assessment:    Patient adm w/ CAD and aortic valve stenosis ; s/p CABG x 2 on 4/17 ; s/p cardiac cath on 2/21 and 2/26 ; noted post-operative hypotension ; hx of DM and prior ETOH abuse ; hx of malnutrition ; pt does meet criteria for moderate malnutrition ; hx of multiple previous hospital admissions ; hx of CVA/SAH/psychosis/schizophrenia/hypothyroid/PVD/COPD ;  CT abdomen with cirrhosis of the liver ;  hxo f PUD with prior GI bleed and right inguinal incarcerated hernia ; wounds noted (hx of multiple debridements) ; will provide recommendations    Nutrition Related Findings:    -I&Os (-1.3 L), 1+ edema, missing teeth, A&O x 4, chest tubes x 3, active BS, redness to sacrum/heels, hyperglycemia, muscle/fat wasting ; Wound Type: Multiple, Open Wounds, Surgical Incision, Wound Consult Pending (wounds x 3 ; incisions x 2 ; puncture site x 1)       Current Nutrition Intake & Therapies:    Average Meal Intake: 26-50%     ADULT ORAL NUTRITION SUPPLEMENT; Breakfast, Lunch, Dinner; Diabetic Oral Supplement  ADULT ORAL NUTRITION SUPPLEMENT; Breakfast, Dinner; Wound Healing Oral 
Date: 4/17/2025    Time: 11:12 PM    Patient Placed On BIPAP/CPAP/ Non-Invasive Ventilation?  Yes    If no must comment.  Facial area red/color change? No           If YES are Blister/Lesion present?No   If yes must notify nursing staff  BIPAP/CPAP skin barrier?  Yes    Skin barrier type:mepilexlite       Comments:        Malvin Wood RCP  
Department of Podiatry  Progress Note    SUBJECTIVE:  Jerome Steel is seen at bedside for right posterior heel wound. No acute events overnight.  Patient states no new pain or sensation to the bilateral lower extremity.  Patient denies any N/V/D/F/C/SOB/CP. No other pedal complaints at this time.     OBJECTIVE:    Scheduled Meds:   insulin glargine  5 Units SubCUTAneous Nightly    carvedilol  3.125 mg Oral BID WC    aspirin  81 mg Oral Daily    polyethylene glycol  17 g Oral BID    bisacodyl  5 mg Oral Daily    sennosides-docusate sodium  1 tablet Oral BID    magnesium hydroxide  30 mL Oral Daily    ferrous sulfate  325 mg Oral BID WC    folic acid  1 mg Oral Daily    vitamin C  500 mg Oral BID    enoxaparin  40 mg SubCUTAneous Daily    insulin lispro  0-6 Units SubCUTAneous 4x Daily AC & HS    atorvastatin  40 mg Oral Nightly    sodium chloride flush  5-40 mL IntraVENous 2 times per day    magnesium oxide  400 mg Oral BID    mupirocin   Each Nostril BID    ipratropium 0.5 mg-albuterol 2.5 mg  1 Dose Inhalation Q4H WA RT    clopidogrel  75 mg Oral Daily    pantoprazole  40 mg Oral QAM AC    lidocaine  1 patch TransDERmal Daily    lactulose  20 g Oral Daily    bisacodyl  10 mg Rectal Daily    collagenase   Topical Daily     Continuous Infusions:   dextrose       PRN Meds:.acetaminophen, amiodarone, amiodarone (CORDARONE) 150 mg in dextrose 5 % 100 mL bolus, diphenhydrAMINE, HYDROmorphone, glucose, dextrose bolus **OR** dextrose bolus, glucagon (rDNA), dextrose, magnesium sulfate, magnesium sulfate, sodium chloride, prochlorperazine, potassium chloride, sodium chloride flush, ondansetron **OR** ondansetron, oxyCODONE **OR** oxyCODONE, [START ON 4/22/2025] oxyCODONE, ibuprofen, calcium carbonate    Allergies   Allergen Reactions    Pcn [Penicillins] Anaphylaxis       BP (!) 103/57   Pulse 80   Temp 97.3 °F (36.3 °C) (Temporal)   Resp 16   Ht 1.778 m (5' 10\")   Wt 77 kg (169 lb 12.8 oz)   SpO2 94%   BMI 24.36 
Department of Podiatry  Progress Note    SUBJECTIVE:  Jerome Steel is seen at bedside for right posterior heel wound. No acute events overnight. Patient denies any N/V/D/F/C/SOB/CP.  Patient states no new pain or sensation to the bilateral lower extremity.  No other pedal complaints at this time.     OBJECTIVE:    Scheduled Meds:   carvedilol  3.125 mg Oral BID WC    insulin glargine  8 Units SubCUTAneous Nightly    aspirin  81 mg Oral Daily    polyethylene glycol  17 g Oral BID    bisacodyl  5 mg Oral Daily    sennosides-docusate sodium  1 tablet Oral BID    magnesium hydroxide  30 mL Oral Daily    ferrous sulfate  325 mg Oral BID WC    folic acid  1 mg Oral Daily    vitamin C  500 mg Oral BID    enoxaparin  40 mg SubCUTAneous Daily    insulin lispro  0-6 Units SubCUTAneous 4x Daily AC & HS    insulin lispro  2 Units SubCUTAneous TID WC    atorvastatin  40 mg Oral Nightly    sodium chloride flush  5-40 mL IntraVENous 2 times per day    magnesium oxide  400 mg Oral BID    mupirocin   Each Nostril BID    ipratropium 0.5 mg-albuterol 2.5 mg  1 Dose Inhalation Q4H WA RT    clopidogrel  75 mg Oral Daily    pantoprazole  40 mg Oral QAM AC    lidocaine  1 patch TransDERmal Daily    lactulose  20 g Oral Daily    ibuprofen  400 mg Oral q8h    [START ON 4/20/2025] bisacodyl  10 mg Rectal Daily    collagenase   Topical Daily     Continuous Infusions:   dextrose       PRN Meds:.acetaminophen, amiodarone, amiodarone (CORDARONE) 150 mg in dextrose 5 % 100 mL bolus, diphenhydrAMINE, HYDROmorphone, glucose, dextrose bolus **OR** dextrose bolus, glucagon (rDNA), dextrose, magnesium sulfate, magnesium sulfate, sodium chloride, prochlorperazine, potassium chloride, sodium chloride flush, ondansetron **OR** ondansetron, oxyCODONE **OR** oxyCODONE, [START ON 4/22/2025] oxyCODONE, [START ON 4/20/2025] ibuprofen, calcium carbonate    Allergies   Allergen Reactions    Pcn [Penicillins] Anaphylaxis       BP 99/63   Pulse 78   Temp 97 
Department of Podiatry  Progress Note    SUBJECTIVE:  Jerome Steel is seen at bedside for right posterior heel wound. No acute events overnight. Patient denies any N/V/D/F/C/SOB/CP.  Patient states no new pain or sensation to the bilateral lower extremity.  Patient denies any constitutional symptoms at this time and is excited to be leaving the hospital.  Patient notes that he would be most likely going to facility.  No other pedal complaints at this time.     OBJECTIVE:    Scheduled Meds:   insulin glargine  5 Units SubCUTAneous Nightly    carvedilol  3.125 mg Oral BID WC    aspirin  81 mg Oral Daily    polyethylene glycol  17 g Oral BID    bisacodyl  5 mg Oral Daily    sennosides-docusate sodium  1 tablet Oral BID    magnesium hydroxide  30 mL Oral Daily    ferrous sulfate  325 mg Oral BID WC    folic acid  1 mg Oral Daily    vitamin C  500 mg Oral BID    enoxaparin  40 mg SubCUTAneous Daily    insulin lispro  0-6 Units SubCUTAneous 4x Daily AC & HS    atorvastatin  40 mg Oral Nightly    sodium chloride flush  5-40 mL IntraVENous 2 times per day    magnesium oxide  400 mg Oral BID    ipratropium 0.5 mg-albuterol 2.5 mg  1 Dose Inhalation Q4H WA RT    clopidogrel  75 mg Oral Daily    pantoprazole  40 mg Oral QAM AC    lidocaine  1 patch TransDERmal Daily    lactulose  20 g Oral Daily    bisacodyl  10 mg Rectal Daily    collagenase   Topical Daily     Continuous Infusions:   dextrose       PRN Meds:.acetaminophen, amiodarone, amiodarone (CORDARONE) 150 mg in dextrose 5 % 100 mL bolus, diphenhydrAMINE, HYDROmorphone, glucose, dextrose bolus **OR** dextrose bolus, glucagon (rDNA), dextrose, magnesium sulfate, magnesium sulfate, sodium chloride, prochlorperazine, potassium chloride, sodium chloride flush, ondansetron **OR** ondansetron, oxyCODONE **OR** oxyCODONE, oxyCODONE, ibuprofen, calcium carbonate    Allergies   Allergen Reactions    Pcn [Penicillins] Anaphylaxis       /62   Pulse 73   Temp 97.1 °F 
Department of Podiatry  Progress Note    SUBJECTIVE:  Jerome Steel is seen at bedside for right posterior heel wound. No acute events overnight. Patient denies any N/V/D/F/C/SOB/CP.  Patient states no new pain or sensation to the bilateral lower extremity.  Patient denies any constitutional symptoms at this time and is excited to be leaving the hospital. No other pedal complaints at this time.  Patient is hopeful for discharge soon.  Patient is discharging to facility.    OBJECTIVE:    Scheduled Meds:   metoprolol succinate  12.5 mg Oral Daily    gabapentin  300 mg Oral BID    tamsulosin  0.4 mg Oral Daily    insulin glargine  5 Units SubCUTAneous Nightly    aspirin  81 mg Oral Daily    polyethylene glycol  17 g Oral BID    bisacodyl  5 mg Oral Daily    sennosides-docusate sodium  1 tablet Oral BID    magnesium hydroxide  30 mL Oral Daily    ferrous sulfate  325 mg Oral BID WC    folic acid  1 mg Oral Daily    vitamin C  500 mg Oral BID    enoxaparin  40 mg SubCUTAneous Daily    insulin lispro  0-6 Units SubCUTAneous 4x Daily AC & HS    atorvastatin  40 mg Oral Nightly    sodium chloride flush  5-40 mL IntraVENous 2 times per day    magnesium oxide  400 mg Oral BID    ipratropium 0.5 mg-albuterol 2.5 mg  1 Dose Inhalation Q4H WA RT    clopidogrel  75 mg Oral Daily    pantoprazole  40 mg Oral QAM AC    lidocaine  1 patch TransDERmal Daily    lactulose  20 g Oral Daily    bisacodyl  10 mg Rectal Daily    collagenase   Topical Daily     Continuous Infusions:   dextrose       PRN Meds:.acetaminophen, amiodarone, amiodarone (CORDARONE) 150 mg in dextrose 5 % 100 mL bolus, diphenhydrAMINE, HYDROmorphone, glucose, dextrose bolus **OR** dextrose bolus, glucagon (rDNA), dextrose, magnesium sulfate, magnesium sulfate, sodium chloride, prochlorperazine, potassium chloride, sodium chloride flush, ondansetron **OR** ondansetron, oxyCODONE, ibuprofen, calcium carbonate    Allergies   Allergen Reactions    Pcn [Penicillins] 
Department of Podiatry  Progress Note    SUBJECTIVE:  Jerome Steel is seen at bedside for right posterior heel wound. No acute events overnight. Patient denies any N/V/D/F/C/SOB/CP.  Patient states no new pain or sensation to the bilateral lower extremity.  Patient denies any constitutional symptoms at this time and is excited to be leaving the hospital. No other pedal complaints at this time. Patient states the room is really hot.     OBJECTIVE:    Scheduled Meds:   metoprolol succinate  12.5 mg Oral Daily    tamsulosin  0.4 mg Oral Daily    insulin glargine  5 Units SubCUTAneous Nightly    aspirin  81 mg Oral Daily    polyethylene glycol  17 g Oral BID    bisacodyl  5 mg Oral Daily    sennosides-docusate sodium  1 tablet Oral BID    magnesium hydroxide  30 mL Oral Daily    ferrous sulfate  325 mg Oral BID WC    folic acid  1 mg Oral Daily    vitamin C  500 mg Oral BID    enoxaparin  40 mg SubCUTAneous Daily    insulin lispro  0-6 Units SubCUTAneous 4x Daily AC & HS    atorvastatin  40 mg Oral Nightly    sodium chloride flush  5-40 mL IntraVENous 2 times per day    magnesium oxide  400 mg Oral BID    ipratropium 0.5 mg-albuterol 2.5 mg  1 Dose Inhalation Q4H WA RT    clopidogrel  75 mg Oral Daily    pantoprazole  40 mg Oral QAM AC    lidocaine  1 patch TransDERmal Daily    lactulose  20 g Oral Daily    bisacodyl  10 mg Rectal Daily    collagenase   Topical Daily     Continuous Infusions:   dextrose       PRN Meds:.acetaminophen, amiodarone, amiodarone (CORDARONE) 150 mg in dextrose 5 % 100 mL bolus, diphenhydrAMINE, HYDROmorphone, glucose, dextrose bolus **OR** dextrose bolus, glucagon (rDNA), dextrose, magnesium sulfate, magnesium sulfate, sodium chloride, prochlorperazine, potassium chloride, sodium chloride flush, ondansetron **OR** ondansetron, oxyCODONE, ibuprofen, calcium carbonate    Allergies   Allergen Reactions    Pcn [Penicillins] Anaphylaxis       /64   Pulse 77   Temp 97.5 °F (36.4 °C) 
Department of Podiatry  Progress Note    SUBJECTIVE:  Jerome Steel is seen at bedside for right posterior heel wound. No acute events overnight. Patient denies any N/V/D/F/C/SOB/CP.  Patient states no new pain or sensation to the bilateral lower extremity. Patient states he can't wait to get out of the hospital/ No other pedal complaints at this time.     OBJECTIVE:    Scheduled Meds:   insulin glargine  5 Units SubCUTAneous Nightly    carvedilol  3.125 mg Oral BID WC    aspirin  81 mg Oral Daily    polyethylene glycol  17 g Oral BID    bisacodyl  5 mg Oral Daily    sennosides-docusate sodium  1 tablet Oral BID    magnesium hydroxide  30 mL Oral Daily    ferrous sulfate  325 mg Oral BID WC    folic acid  1 mg Oral Daily    vitamin C  500 mg Oral BID    enoxaparin  40 mg SubCUTAneous Daily    insulin lispro  0-6 Units SubCUTAneous 4x Daily AC & HS    atorvastatin  40 mg Oral Nightly    sodium chloride flush  5-40 mL IntraVENous 2 times per day    magnesium oxide  400 mg Oral BID    mupirocin   Each Nostril BID    ipratropium 0.5 mg-albuterol 2.5 mg  1 Dose Inhalation Q4H WA RT    clopidogrel  75 mg Oral Daily    pantoprazole  40 mg Oral QAM AC    lidocaine  1 patch TransDERmal Daily    lactulose  20 g Oral Daily    bisacodyl  10 mg Rectal Daily    collagenase   Topical Daily     Continuous Infusions:   dextrose       PRN Meds:.acetaminophen, amiodarone, amiodarone (CORDARONE) 150 mg in dextrose 5 % 100 mL bolus, diphenhydrAMINE, HYDROmorphone, glucose, dextrose bolus **OR** dextrose bolus, glucagon (rDNA), dextrose, magnesium sulfate, magnesium sulfate, sodium chloride, prochlorperazine, potassium chloride, sodium chloride flush, ondansetron **OR** ondansetron, oxyCODONE **OR** oxyCODONE, [START ON 4/22/2025] oxyCODONE, ibuprofen, calcium carbonate    Allergies   Allergen Reactions    Pcn [Penicillins] Anaphylaxis       BP 94/64   Pulse 84   Temp 97.4 °F (36.3 °C) (Temporal)   Resp 16   Ht 1.778 m (5' 10\")   
Department of Podiatry  Progress Note    SUBJECTIVE:  Patient seen bedside for right posterior heel wound.  Patient was intubated and on BiPAP.  Patient awake and feeling well.  Patient denies any N/V/D/F/C/SOB/CP and has no other pedal complaints at this time.  Patient recognizes resident from previous interaction.    OBJECTIVE:    Scheduled Meds:   atorvastatin  40 mg Oral Nightly    sodium chloride flush  5-40 mL IntraVENous 2 times per day    aspirin  81 mg Oral Daily    chlorhexidine  15 mL Mouth/Throat BID    magnesium oxide  400 mg Oral BID    mupirocin   Each Nostril BID    polyethylene glycol  17 g Oral BID    bisacodyl  5 mg Oral Daily    sennosides-docusate sodium  1 tablet Oral BID    ceFAZolin (ANCEF) IV  2,000 mg IntraVENous Q8H    ipratropium 0.5 mg-albuterol 2.5 mg  1 Dose Inhalation Q4H WA RT    insulin glargine  0.15 Units/kg SubCUTAneous Nightly    tamsulosin  0.4 mg Oral Daily    clopidogrel  75 mg Oral Daily    pantoprazole  40 mg Oral QAM AC    lidocaine  1 patch TransDERmal Daily    [START ON 4/19/2025] lactulose  20 g Oral Daily    ketorolac  15 mg IntraVENous Q6H    [START ON 4/19/2025] ibuprofen  400 mg Oral q8h    insulin lispro  0-4 Units SubCUTAneous Q4H    [START ON 4/20/2025] bisacodyl  10 mg Rectal Daily    acetaminophen  1,000 mg Oral 4 times per day    collagenase   Topical Daily     Continuous Infusions:   sodium chloride 30 mL/hr at 04/17/25 1901    sodium chloride      propofol 10 mcg/kg/min (04/17/25 1115)    sodium chloride      norepinephrine Stopped (04/17/25 1521)    nitroPRUSSide (NIPRIDE) 50 mg in sodium chloride 0.9 % 250 mL infusion      insulin Stopped (04/18/25 0509)    dextrose      dexmedeTOMIDine (PRECEDEX) 1,000 mcg in sodium chloride 0.9 % 250 mL infusion       PRN Meds:.sodium chloride flush, sodium chloride, ondansetron **OR** ondansetron, oxyCODONE **OR** oxyCODONE, HYDROmorphone **OR** HYDROmorphone, meperidine, propofol, magnesium hydroxide, bisacodyl, 
ENDOCRINOLOGY PROGRESS NOTE      Date of admission: 4/17/2025  Date of service: 4/20/2025  Admitting physician: An Garcia MD   Primary Care Physician: Genet Kraus DO  Consultant physician: Aki Linda MD     Reason for the consultation:  Uncontrolled DM    History of Present Illness:  The history is provided by the patient. Accuracy of the patient data is excellent    Jerome Steel is a very pleasant 64 y.o. old male with PMH of diabetes type 2, MV CAD, dyslipidemia and other listed below admitted to [facility] on 4/17/2025 for elective CABG, endocrine service was consulted for diabetes management.    Subjective  I saw and examined the patient at bedside this morning, no acute events overnight, glucose level in range most of the time.    Inpatient diet:   Carb Restricted diet     Point of care glucose monitoring   (Independently reviewed)   Recent Labs     04/18/25  0958 04/18/25  1421 04/18/25  1957 04/19/25  0639 04/19/25  1116 04/19/25  1611 04/19/25  1937 04/20/25  0529   POCGLU 150* 236* 198* 113* 124* 109* 105* 89     Allergy and drug reactions:   Allergies   Allergen Reactions    Pcn [Penicillins] Anaphylaxis       Scheduled Meds:   carvedilol  3.125 mg Oral BID WC    insulin glargine  8 Units SubCUTAneous Nightly    aspirin  81 mg Oral Daily    polyethylene glycol  17 g Oral BID    bisacodyl  5 mg Oral Daily    sennosides-docusate sodium  1 tablet Oral BID    magnesium hydroxide  30 mL Oral Daily    ferrous sulfate  325 mg Oral BID WC    folic acid  1 mg Oral Daily    vitamin C  500 mg Oral BID    enoxaparin  40 mg SubCUTAneous Daily    insulin lispro  0-6 Units SubCUTAneous 4x Daily AC & HS    insulin lispro  2 Units SubCUTAneous TID WC    atorvastatin  40 mg Oral Nightly    sodium chloride flush  5-40 mL IntraVENous 2 times per day    magnesium oxide  400 mg Oral BID    mupirocin   Each Nostril BID    ipratropium 0.5 mg-albuterol 2.5 mg  1 Dose Inhalation Q4H WA RT    clopidogrel  75 mg Oral Daily 
ENDOCRINOLOGY PROGRESS NOTE      Date of admission: 4/17/2025  Date of service: 4/21/2025  Admitting physician: No admitting provider for patient encounter.   Primary Care Physician: Genet Kraus DO  Consultant physician: Aki Linda MD     Reason for the consultation:  Uncontrolled DM    History of Present Illness:  The history is provided by the patient. Accuracy of the patient data is excellent    Jerome Steel is a very pleasant 64 y.o. old male with PMH of diabetes type 2, MV CAD, dyslipidemia and other listed below admitted to [facility] on 4/17/2025 for elective CABG, endocrine service was consulted for diabetes management.    Subjective  The patient was seen this afternoon, no acute events overnight, glucose level in range most of the time.  No hypoglycemia  Inpatient diet:   Carb Restricted diet     Point of care glucose monitoring   (Independently reviewed)   Recent Labs     04/19/25  1611 04/19/25  1937 04/20/25  0529 04/20/25  1126 04/20/25  1625 04/20/25 2010 04/21/25  0541 04/21/25  1150   POCGLU 109* 105* 89 107* 181* 128* 104* 147*     Allergy and drug reactions:   Allergies   Allergen Reactions    Pcn [Penicillins] Anaphylaxis       Scheduled Meds:   insulin glargine  5 Units SubCUTAneous Nightly    carvedilol  3.125 mg Oral BID WC    aspirin  81 mg Oral Daily    polyethylene glycol  17 g Oral BID    bisacodyl  5 mg Oral Daily    sennosides-docusate sodium  1 tablet Oral BID    magnesium hydroxide  30 mL Oral Daily    ferrous sulfate  325 mg Oral BID WC    folic acid  1 mg Oral Daily    vitamin C  500 mg Oral BID    enoxaparin  40 mg SubCUTAneous Daily    insulin lispro  0-6 Units SubCUTAneous 4x Daily AC & HS    atorvastatin  40 mg Oral Nightly    sodium chloride flush  5-40 mL IntraVENous 2 times per day    magnesium oxide  400 mg Oral BID    mupirocin   Each Nostril BID    ipratropium 0.5 mg-albuterol 2.5 mg  1 Dose Inhalation Q4H WA RT    clopidogrel  75 mg Oral Daily    pantoprazole  40 mg 
ENDOCRINOLOGY PROGRESS NOTE      Date of admission: 4/17/2025  Date of service: 4/22/2025  Admitting physician: No admitting provider for patient encounter.   Primary Care Physician: Genet Kraus DO  Consultant physician: Aki Linda MD     Reason for the consultation:  Uncontrolled DM    History of Present Illness:  The history is provided by the patient. Accuracy of the patient data is excellent    Jerome Steel is a very pleasant 64 y.o. old male with PMH of diabetes type 2, MV CAD, dyslipidemia and other listed below admitted to [facility] on 4/17/2025 for elective CABG, endocrine service was consulted for diabetes management.    Subjective  There is no acute events overnight, mostly glucose level in range.  No hypoglycemia.    Inpatient diet:   Carb Restricted diet     Point of care glucose monitoring   (Independently reviewed)   Recent Labs     04/21/25  0541 04/21/25  1150 04/21/25  1653 04/21/25  2022 04/22/25  0526 04/22/25  1204 04/22/25  1618 04/22/25 2043   POCGLU 104* 147* 119* 155* 123* 161* 143* 182*     Allergy and drug reactions:   Allergies   Allergen Reactions    Pcn [Penicillins] Anaphylaxis       Scheduled Meds:   insulin glargine  5 Units SubCUTAneous Nightly    carvedilol  3.125 mg Oral BID WC    aspirin  81 mg Oral Daily    polyethylene glycol  17 g Oral BID    bisacodyl  5 mg Oral Daily    sennosides-docusate sodium  1 tablet Oral BID    magnesium hydroxide  30 mL Oral Daily    ferrous sulfate  325 mg Oral BID WC    folic acid  1 mg Oral Daily    vitamin C  500 mg Oral BID    enoxaparin  40 mg SubCUTAneous Daily    insulin lispro  0-6 Units SubCUTAneous 4x Daily AC & HS    atorvastatin  40 mg Oral Nightly    sodium chloride flush  5-40 mL IntraVENous 2 times per day    magnesium oxide  400 mg Oral BID    ipratropium 0.5 mg-albuterol 2.5 mg  1 Dose Inhalation Q4H WA RT    clopidogrel  75 mg Oral Daily    pantoprazole  40 mg Oral QAM AC    lidocaine  1 patch TransDERmal Daily    lactulose 
ENDOCRINOLOGY PROGRESS NOTE      Date of admission: 4/17/2025  Date of service: 4/23/2025  Admitting physician: No admitting provider for patient encounter.   Primary Care Physician: Genet Kraus DO  Consultant physician: Aki Linda MD     Reason for the consultation:  Uncontrolled DM    History of Present Illness:  The history is provided by the patient. Accuracy of the patient data is excellent    Jerome Steel is a very pleasant 64 y.o. old male with PMH of diabetes type 2, MV CAD, dyslipidemia and other listed below admitted to [facility] on 4/17/2025 for elective CABG, endocrine service was consulted for diabetes management.    Subjective  No acute events, blood sugar in range most of the time.    Inpatient diet:   Carb Restricted diet     Point of care glucose monitoring   (Independently reviewed)   Recent Labs     04/22/25  0526 04/22/25  1204 04/22/25  1618 04/22/25  2043 04/23/25  0615 04/23/25  1145 04/23/25  1620 04/23/25 2059   POCGLU 123* 161* 143* 182* 124* 193* 185* 187*     Allergy and drug reactions:   Allergies   Allergen Reactions    Pcn [Penicillins] Anaphylaxis       Scheduled Meds:   metoprolol succinate  12.5 mg Oral Daily    tamsulosin  0.4 mg Oral Daily    insulin glargine  5 Units SubCUTAneous Nightly    aspirin  81 mg Oral Daily    polyethylene glycol  17 g Oral BID    bisacodyl  5 mg Oral Daily    sennosides-docusate sodium  1 tablet Oral BID    magnesium hydroxide  30 mL Oral Daily    ferrous sulfate  325 mg Oral BID WC    folic acid  1 mg Oral Daily    vitamin C  500 mg Oral BID    enoxaparin  40 mg SubCUTAneous Daily    insulin lispro  0-6 Units SubCUTAneous 4x Daily AC & HS    atorvastatin  40 mg Oral Nightly    sodium chloride flush  5-40 mL IntraVENous 2 times per day    magnesium oxide  400 mg Oral BID    ipratropium 0.5 mg-albuterol 2.5 mg  1 Dose Inhalation Q4H WA RT    clopidogrel  75 mg Oral Daily    pantoprazole  40 mg Oral QAM AC    lidocaine  1 patch TransDERmal Daily 
ENDOCRINOLOGY PROGRESS NOTE      Date of admission: 4/17/2025  Date of service: 4/24/2025  Admitting physician: No admitting provider for patient encounter.   Primary Care Physician: Genet Kraus DO  Consultant physician: Aki Linda MD     Reason for the consultation:  Uncontrolled DM    History of Present Illness:  The history is provided by the patient. Accuracy of the patient data is excellent    Jerome Steel is a very pleasant 64 y.o. old male with PMH of diabetes type 2, MV CAD, dyslipidemia and other listed below admitted to [facility] on 4/17/2025 for elective CABG, endocrine service was consulted for diabetes management.    Subjective  The patient was seen early this morning, no acute events overnight, mostly glucose level in the range    Inpatient diet:   Carb Restricted diet     Point of care glucose monitoring   (Independently reviewed)   Recent Labs     04/22/25  1204 04/22/25  1618 04/22/25  2043 04/23/25  0615 04/23/25  1145 04/23/25  1620 04/23/25 2059 04/24/25  0514   POCGLU 161* 143* 182* 124* 193* 185* 187* 143*     Allergy and drug reactions:   Allergies   Allergen Reactions    Pcn [Penicillins] Anaphylaxis       Scheduled Meds:   metoprolol succinate  12.5 mg Oral Daily    gabapentin  300 mg Oral BID    tamsulosin  0.4 mg Oral Daily    insulin glargine  5 Units SubCUTAneous Nightly    aspirin  81 mg Oral Daily    polyethylene glycol  17 g Oral BID    bisacodyl  5 mg Oral Daily    sennosides-docusate sodium  1 tablet Oral BID    magnesium hydroxide  30 mL Oral Daily    ferrous sulfate  325 mg Oral BID WC    folic acid  1 mg Oral Daily    vitamin C  500 mg Oral BID    enoxaparin  40 mg SubCUTAneous Daily    insulin lispro  0-6 Units SubCUTAneous 4x Daily AC & HS    atorvastatin  40 mg Oral Nightly    sodium chloride flush  5-40 mL IntraVENous 2 times per day    magnesium oxide  400 mg Oral BID    ipratropium 0.5 mg-albuterol 2.5 mg  1 Dose Inhalation Q4H WA RT    clopidogrel  75 mg Oral 
H and P from 3/18/25 is reviewed and there are no changes.  Baltazar Sterling
Medical team attending to patient.    Offered presence and prayer.  
Nurse to nurse called to [facility].    Bridget Correia RN  
Nurse to nurse report given to RN on PCCU.  
Occupational Therapy  OT BEDSIDE TREATMENT NOTE   [facility]  [address] [place], [place]      Date:2025  Patient Name: Jerome Steel  MRN: [id number]  : 1960  Room: [address] [place] [address]      Evaluating OT: Kalee Stanton OTR/L; [id number]      Referring Provider: Sally Esqueda PA    Specific Provider Orders/Date: OT eval and treat (25 1045)     Diagnosis: Coronary artery disease involving native coronary artery of native heart without angina pectoris [I25.10]  Coronary artery disease, unspecified vessel or lesion type, unspecified whether angina present, unspecified whether native or transplanted heart [I25.10]      Reason for admission: Pt admitted for surgical intervention of CAD.     Surgery/Procedures:    25 Sternotomy/Pump assist CABG x 2 (LIMA-LAD, SVG-OM2)/MAHAD exclusion with 40mm AtriClip/Posterior pericardotomy/EVH LLE/Rigid internal fixation of the sternum with William plates x 2/22 modifier.     Pertinent Medical History:   Past Medical History        Past Medical History:   Diagnosis Date    Abscess of prostate      Anemia      Arthritis      Atherosclerotic heart disease of native coronary artery without angina pectoris      Bilateral carpal tunnel syndrome 2016    Cerebral artery occlusion with cerebral infarction ([facility])      Chronic back pain      COPD (chronic obstructive pulmonary disease) (HCC)      Coronary artery disease involving native coronary artery of native heart without angina pectoris 2022    CVA (cerebral vascular accident) ([facility]) 2015, 2017    Diabetes mellitus ([facility])       Type 2    Diabetic foot ulcer with osteomyelitis (HCC) 2021    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 
POD#2 Awake, alert. No complaints. Denies CP, palpitations, SOB at rest, dizziness/lightheadedness.    Vitals:    04/19/25 1030 04/19/25 1031 04/19/25 1101 04/19/25 1104   BP: 110/73   99/63   Pulse:    78   Resp:  22 20 16   Temp:    97 °F (36.1 °C)   TempSrc:    Temporal   SpO2:    96%   Weight:       Height:         O2: RA      Intake/Output Summary (Last 24 hours) at 4/19/2025 1412  Last data filed at 4/19/2025 1407  Gross per 24 hour   Intake 660 ml   Output 935 ml   Net -275 ml       +BM pre-op    UO: 150mL/8hr     CT OUTPUT:   Mediastinal: 140mL/8hr (200mL/24hrs)   Left Pleural: 60mL/8hr (100mL/24hrs)   Right Pleural: 80mL/8hr (150mL/24hrs)      Recent Labs     04/17/25  1045 04/18/25  0409 04/19/25  0808   WBC 26.0* 14.4* 10.0   HGB 8.3* 7.6* 7.8*   HCT 26.1* 23.5* 24.7*    165 190      Recent Labs     04/17/25  1045 04/18/25  0409 04/19/25  0808   BUN 12 17 27*   CREATININE 0.6* 0.6* 0.6*         Telemetry: SR      PE  Cardiac: RRR  Lungs: decreased bases  Chest incision with intact SAMARIA DSD. Sternum stable. Prior chest tube site incisions C/D/I, no erythema with intact sutures. Chest tubes x 3 and Epicardial pacing wires present and secure.   Abd: Soft, nontender, +BS  Ext: Incisions C/D/I, approximated, no erythema, + edema        A/P: POD#2      1. CAD, critical LM  --Stable s/p Pump assist CABG x 2 (LIMA-LAD, SVG-OM2), MAHAD exclusion with 40mm AtriClip, Posterior pericardotomy, EVH LLE on 4/17/2025  --Post op LYRIC reveals 55% EF  --Scr stable; 0.6  --DAPT/statin/BB - Coreg added today  --reinforce sternal precautions  --continue epicardial pacing wires  --chest tubes without significant output and without airleaks.  Continue remaining chest tube(s) today, will likely continue removal tomorrow.        2. Expected acute blood loss anemia secondary to open heart surgery  --hgb stable; 7.8      3. NSR  --Add BB with hold parameters - Coreg 3.125mg  --Pre-op Amio for afib prophylaxis given: No, per  
POD#3 Awake, alert. No complaints. Denies CP, palpitations, SOB at rest, dizziness/lightheadedness.    Vitals:    04/19/25 1101 04/19/25 1104 04/19/25 1448 04/19/25 1457   BP:  99/63 106/62    Pulse:  78  75   Resp: 20 16  16   Temp:  97 °F (36.1 °C)  97 °F (36.1 °C)   TempSrc:  Temporal  Temporal   SpO2:  96%  92%   Weight:       Height:         O2: RA      Intake/Output Summary (Last 24 hours) at 4/19/2025 1534  Last data filed at 4/19/2025 1407  Gross per 24 hour   Intake 660 ml   Output 910 ml   Net -250 ml       +BM on 4/19    UO: 825mL/8hr     CT OUTPUT:   Mediastinal: 60mL/8hr (280mL/24hrs)   Left Pleural: 60mL/8hr (120mL/24hrs)   Right Pleural: 25mL/8hr (105mL/24hrs)      Recent Labs     04/17/25  1045 04/18/25  0409 04/19/25  0808   WBC 26.0* 14.4* 10.0   HGB 8.3* 7.6* 7.8*   HCT 26.1* 23.5* 24.7*    165 190      Recent Labs     04/17/25  1045 04/18/25  0409 04/19/25  0808   BUN 12 17 27*   CREATININE 0.6* 0.6* 0.6*         Telemetry: SR      PE  Cardiac: RRR  Lungs: decreased bases  Chest incision with intact SAMARIA DSD.  Sternum stable. Prior chest tube site incisions C/D/I, no erythema with intact sutures. Chest tubes x 3 and Epicardial pacing wires present and secure.   Abd: Soft, nontender, +BS  Ext: Incisions C/D/I, approximated, no erythema, + edema        A/P: POD#3      1. CAD, critical LM  --Stable s/p Pump assist CABG x 2 (LIMA-LAD, SVG-OM2), MAHAD exclusion with 40mm AtriClip, Posterior pericardotomy, EVH LLE on 4/17/2025  --Post op LYRIC reveals 55% EF  --Scr stable; 0.6  --DAPT/statin/BB  --reinforce sternal precautions  --continue epicardial pacing wires  --chest tubes without significant output and without airleaks. Left pleual bulb completely filled again this morning. Right pleural chest tube removed without difficulty. Left and MS remain.        2. Expected acute blood loss anemia secondary to open heart surgery  --hgb stable; 9.4        3. NSR  --Continue BB with hold parameters - Coreg 
POD#4 Awake, alert. No complaints. Denies CP, palpitations, SOB at rest, dizziness/lightheadedness.    Vitals:    04/21/25 1002 04/21/25 1006 04/21/25 1148 04/21/25 1150   BP:  94/64 (!) 111/59    Pulse:  84 82    Resp: 15  15    Temp:   97.5 °F (36.4 °C)    TempSrc:   Temporal    SpO2:   96% 96%   Weight:       Height:         O2: RA      Intake/Output Summary (Last 24 hours) at 4/21/2025 1245  Last data filed at 4/21/2025 1230  Gross per 24 hour   Intake 1004.14 ml   Output 2360 ml   Net -1355.86 ml       +BM on 4/20    UO: 1050mL/8hr     CT OUTPUT:   Mediastinal: 80mL/8hr (190mL/24hrs)   Left Pleural: 90mL/8hr (300mL/24hrs)       Recent Labs     04/19/25  0808 04/20/25  0646 04/21/25  0614   WBC 10.0 10.2 9.8   HGB 7.8* 9.4* 9.2*   HCT 24.7* 29.6* 28.3*    270 290      Recent Labs     04/19/25  0808 04/20/25  0646 04/21/25  0614   BUN 27* 24* 16   CREATININE 0.6* 0.6* 0.6*         Telemetry: SR      PE  Cardiac: RRR  Lungs: decreased bases  Chest incision with intact SAMARIA DSD.  Sternum stable. Prior chest tube site incisions C/D/I, no erythema with intact sutures. Chest tubes x 2 and Epicardial pacing wires present and secure.   Abd: Soft, nontender, +BS  Ext: Incisions C/D/I, approximated, no erythema, + edema        A/P: POD#4    1. CAD, critical LM  --Stable s/p Pump assist CABG x 2 (LIMA-LAD, SVG-OM2), MAHAD exclusion with 40mm AtriClip, Posterior pericardotomy, EVH LLE on 4/17/2025  --Post op LYRIC reveals 55% EF  --Scr stable  --DAPT/statin/BB  --reinforce sternal precautions  --continue epicardial pacing wires  --chest tubes without significant output and without airleaks MS chest tube removed without difficulty. Keep left pleural today        2. Expected acute blood loss anemia secondary to open heart surgery  --hgb stable; 9.2        3. NSR  --Continue BB with hold parameters - Coreg 3.125mg  --Pre-op Amio for afib prophylaxis given: No, per Dr. Sterling Continued Postop: No, bradycardia, Vpaced postop    
POD#5 Awake, alert. No complaints. Denies CP, palpitations, SOB at rest, dizziness/lightheadedness.    Vitals:    04/21/25 2302 04/22/25 0344 04/22/25 0540 04/22/25 0551   BP: 116/63 132/63     Pulse: 80 70     Resp: 16 15 19    Temp: 98.3 °F (36.8 °C) 97.7 °F (36.5 °C)     TempSrc: Temporal Oral     SpO2: 97% 96%     Weight:    78.8 kg (173 lb 11.2 oz)   Height:         O2: RA      Intake/Output Summary (Last 24 hours) at 4/22/2025 0733  Last data filed at 4/22/2025 0005  Gross per 24 hour   Intake 528.93 ml   Output 1055 ml   Net -526.07 ml       +BM on 4/20    UO: 400mL/8hr     CT OUTPUT:   Left Pleural: 25mL/8hr (285mL/24hrs)       Recent Labs     04/20/25  0646 04/21/25  0614 04/22/25  0543   WBC 10.2 9.8 8.6   HGB 9.4* 9.2* 9.4*   HCT 29.6* 28.3* 28.7*    290 322      Recent Labs     04/20/25  0646 04/21/25  0614 04/22/25  0543   BUN 24* 16 16   CREATININE 0.6* 0.6* 0.6*         Telemetry: SR      PE  Cardiac: RRR  Lungs: decreased bases  Chest incision with intact SAMARIA DSD. Sternum stable. Prior chest tube site incisions C/D/I, no erythema with intact sutures. Chest tubes x 1 and Epicardial pacing wires present and secure.   Abd: Soft, nontender, +BS  Ext: Incisions C/D/I, approximated, no erythema, + edema        A/P: POD#5      1. CAD, critical LM  --Stable s/p Pump assist CABG x 2 (LIMA-LAD, SVG-OM2), MAHAD exclusion with 40mm AtriClip, Posterior pericardotomy, EVH LLE on 4/17/2025  --Post op LYRIC reveals 55% EF  --Scr stable  --DAPT/statin/BB  --reinforce sternal precautions  --continue epicardial pacing wires  --chest tubes without significant output and without airleaks. Remove last tube today.        2. Expected acute blood loss anemia secondary to open heart surgery  --hgb stable; 9.4        3. NSR  --Continue BB with hold parameters - Coreg 3.125mg  --Pre-op Amio for afib prophylaxis given: No, per Dr. Sterling Continued Postop: No, bradycardia, Vpaced postop         4. Postoperative 
POD#6 Awake, alert. C/o expected surgical discomfort. Denies CP, palpitations, SOB at rest, dizziness/lightheadedness.    Vitals:    04/23/25 0028 04/23/25 0333 04/23/25 0558 04/23/25 0750   BP: 119/64 (!) 102/46 (!) 109/59 (!) 91/59   Pulse:  71  77   Resp:  18  18   Temp:  97.9 °F (36.6 °C)  98.2 °F (36.8 °C)   TempSrc:  Temporal  Temporal   SpO2:  96%  95%   Weight:       Height:         O2: none      Intake/Output Summary (Last 24 hours) at 4/23/2025 0809  Last data filed at 4/23/2025 0617  Gross per 24 hour   Intake 1080 ml   Output 1650 ml   Net -570 ml         +BM on 4/20    UO: 1650mL/8hr         Recent Labs     04/21/25  0614 04/22/25  0543 04/23/25  0535   WBC 9.8 8.6 7.7   HGB 9.2* 9.4* 8.7*   HCT 28.3* 28.7* 26.2*    322 289      Recent Labs     04/21/25  0614 04/22/25  0543 04/23/25  0535   BUN 16 16 21   CREATININE 0.6* 0.6* 0.7         Telemetry: SR        PE  Cardiac: RRR  Lungs: decreased bases  Chest incision C/D/I, approximated, no erythema. Sternum stable. Prior chest tube site incisions C/D/I, no erythema with intact sutures.   Abd: Soft, nontender, +BS  Ext: Incisions C/D/I, approximated, no erythema, +minimal edema           A/P: POD#6        1. CAD, critical LM  --Stable s/p Pump assist CABG x 2 (LIMA-LAD, SVG-OM2), MAHAD exclusion with 40mm AtriClip, Posterior pericardotomy, EVH LLE on 4/17/2025  --Post op LYRIC reveals 55% EF  --Scr stable  --DAPT/statin/BB  --reinforce sternal precautions  --epicardial pacing wires previously removed  --last chest tube removed 4/22/25        2. Expected acute blood loss anemia secondary to open heart surgery  --hgb stable; 8.7        3. NSR  --Continue BB with hold parameters - Coreg 3.125mg. DC coreg d/t intermittent soft BPs and add low dose toprol xl  --Pre-op Amio for afib prophylaxis given: No, per Dr. Sterling Continued Postop: No, bradycardia, Vpaced postop         4. Postoperative Hypotension  --Levophed infusion postop to maintain MAP >65, weaned 
Patient admitted to CVICU post-operatively with Cole catheter  in place, draining patently with no  dependent loops and clipped to bed. Patient arrived intubated with stable vital signs on continuous monitor  
Patient admitted to [facility] with the following belongings:  Glasses. The following belongings admitted with the patient, None, were sent home with the patient's family.   4 Eyes Skin Assessment     NAME:  Jerome Steel  YOB: 1960  MEDICAL RECORD NUMBER:  [id number]    The patient is being assessed for  Post-Op Surgical    I agree that at least one RN has performed a thorough Head to Toe Skin Assessment on the patient. ALL assessment sites listed below have been assessed.      Areas assessed by both nurses:    Head, Face, Ears, Shoulders, Back, Chest, Arms, Elbows, Hands, Sacrum. Buttock, Coccyx, Ischium, Legs. Feet and Heels, and Under Medical Devices         Does the Patient have a Wound? Yes wound(s) were present on assessment. LDA wound assessment was Initiated and completed by RN  Right heel wound- 3x4.8x0.1- Pressure injury  Left second toe- 1x1x0.1 purple, non-blanchable errythemia  Ecchymosis- generalized RIZWANA BAINS Prevention initiated by RN: Yes  Wound Care Orders initiated by RN: Yes    Pressure Injury (Stage 3,4, Unstageable, DTI, NWPT, and Complex wounds) if present, place Wound referral order by RN under : Yes    New Ostomies, if present place, Ostomy referral order under : No     Nurse 1 eSignature: Electronically signed by Erik Bo RN on 4/17/25 at 1:45 PM EDT    **SHARE this note so that the co-signing nurse can place an eSignature**    Nurse 2 eSignature: Electronically signed by Colleen Montoya RN on 4/17/25 at 4:15 PM EDT  
Patient extubated to 4L Nasal Cannula. SpO2 95% with bilateral breath sounds and no evidence of stridor.  
Patient refusing to be turned at this time. Educated on importance to prevent skin breakdown. Still refusing at this time.   
Patient released from anesthesia. Patient arousable to voice and following commands on ventilator.  
Patient's glasses taken to OR with patient by nurse circulator  
Patient's taylor catheter placed during CABG due to known prostate issues. Urology consulted for recommendations on taylor. Patient states that he refuses to take taylor catheter out at this time because he \"cannot ambulate to the restroom and there will be pee everywhere\"  
Per Aparna from CTS, pt dos not need coreg this AM due to pt taking an evening dose last night.  CTS also aware of wound on R heel.  Per CTS and pharmacy pt is still able to have ancef despite PCN allergy due to medications having a low cross reaction.  
Per Dr Christianson pt to have taylor go to with him on dc, do not remove taylor for voiding trial   
Physical Therapy  Physical Therapy Initial Assessment     Name: Jerome Steel  : 1960  MRN: [id number]      Date of Service: 2025    Evaluating PT:  Nahun Collazo, PT, DPT [id number]    Room #:  3821/3821-A  Diagnosis:  Coronary artery disease involving native coronary artery of native heart without angina pectoris [I25.10]  Coronary artery disease, unspecified vessel or lesion type, unspecified whether angina present, unspecified whether native or transplanted heart [I25.10]  PMHx/PSHx:    Past Medical History:   Diagnosis Date    Abscess of prostate     Anemia     Arthritis     Atherosclerotic heart disease of native coronary artery without angina pectoris     Bilateral carpal tunnel syndrome     Cerebral artery occlusion with cerebral infarction ([facility])     Chronic back pain     COPD (chronic obstructive pulmonary disease) ([facility])     Coronary artery disease involving native coronary artery of native heart without angina pectoris 2022    CVA (cerebral vascular accident) ([facility]) 2015,     Diabetes mellitus ([facility])     Type 2    Diabetic foot ulcer with osteomyelitis ([facility]) 2021    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 2 diabetes mellitus, with necrosis of bone 2021    Disease of multiple valves of heart     Dysphagia     EtOH dependence ([facility])     Falls     GERD (gastroesophageal reflux disease)     Hemiparesis affecting left side as late effect of cerebrovascular accident ([facility])     LEFT SIDE NON DOMINANT FOLLOWING STROKE    Hydrocele     Hyperlipidemia     Hypertension     Hypothyroid     Inguinal hernia     MI (myocardial infarction) ([facility])     Moderate aortic regurgitation     Multiple fractures of rib involving four or more ribs     left    Obstructive and reflux uropathy     Osteomyelitis, ankle and foot ([facility])     left    Paralytic gait     Peripheral vascular angioplasty status 2021    PFO (patent foramen ovale)     Psychosis ([facility])     Schizophrenia     
Physical Therapy  Physical Therapy Rx    Name: Jerome Steel  : 1960  MRN: [id number]      Date of Service: 2025    Evaluating PT:  Nahun Collazo, PT, DPT [id number]    Room #:  0617/6517-A  Diagnosis:  Coronary artery disease involving native coronary artery of native heart without angina pectoris [I25.10]  Coronary artery disease, unspecified vessel or lesion type, unspecified whether angina present, unspecified whether native or transplanted heart [I25.10]  PMHx/PSHx:    Past Medical History:   Diagnosis Date    Abscess of prostate     Anemia     Arthritis     Atherosclerotic heart disease of native coronary artery without angina pectoris     Bilateral carpal tunnel syndrome     Cerebral artery occlusion with cerebral infarction ([facility])     Chronic back pain     COPD (chronic obstructive pulmonary disease) ([facility])     Coronary artery disease involving native coronary artery of native heart without angina pectoris 2022    CVA (cerebral vascular accident) ([facility]) 2015,     Diabetes mellitus ([facility])     Type 2    Diabetic foot ulcer with osteomyelitis ([facility]) 2021    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 2 diabetes mellitus, with necrosis of bone 2021    Disease of multiple valves of heart     Dysphagia     EtOH dependence ([facility])     Falls     GERD (gastroesophageal reflux disease)     Hemiparesis affecting left side as late effect of cerebrovascular accident ([facility])     LEFT SIDE NON DOMINANT FOLLOWING STROKE    Hydrocele     Hyperlipidemia     Hypertension     Hypothyroid     Inguinal hernia     MI (myocardial infarction) ([facility])     Moderate aortic regurgitation     Multiple fractures of rib involving four or more ribs     left    Obstructive and reflux uropathy     Osteomyelitis, ankle and foot ([facility])     left    Paralytic gait     Peripheral vascular angioplasty status 2021    PFO (patent foramen ovale)     Psychosis ([facility])     Schizophrenia     Spondylosis     TIA 
Physical Therapy  Physical Therapy Rx    Name: Jerome Steel  : 1960  MRN: [id number]      Date of Service: 2025    Evaluating PT:  Nahun Collazo, PT, DPT [id number]    Room #:  0017/6517-A  Diagnosis:  Coronary artery disease involving native coronary artery of native heart without angina pectoris [I25.10]  Coronary artery disease, unspecified vessel or lesion type, unspecified whether angina present, unspecified whether native or transplanted heart [I25.10]  PMHx/PSHx:    Past Medical History:   Diagnosis Date    Abscess of prostate     Anemia     Arthritis     Atherosclerotic heart disease of native coronary artery without angina pectoris     Bilateral carpal tunnel syndrome     Cerebral artery occlusion with cerebral infarction ([facility])     Chronic back pain     COPD (chronic obstructive pulmonary disease) ([facility])     Coronary artery disease involving native coronary artery of native heart without angina pectoris 2022    CVA (cerebral vascular accident) ([facility]) 2015,     Diabetes mellitus ([facility])     Type 2    Diabetic foot ulcer with osteomyelitis ([facility]) 2021    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 2 diabetes mellitus, with necrosis of bone 2021    Disease of multiple valves of heart     Dysphagia     EtOH dependence ([facility])     Falls     GERD (gastroesophageal reflux disease)     Hemiparesis affecting left side as late effect of cerebrovascular accident ([facility])     LEFT SIDE NON DOMINANT FOLLOWING STROKE    Hydrocele     Hyperlipidemia     Hypertension     Hypothyroid     Inguinal hernia     MI (myocardial infarction) ([facility])     Moderate aortic regurgitation     Multiple fractures of rib involving four or more ribs     left    Obstructive and reflux uropathy     Osteomyelitis, ankle and foot ([facility])     left    Paralytic gait     Peripheral vascular angioplasty status 2021    PFO (patent foramen ovale)     Psychosis ([facility])     Schizophrenia     Spondylosis     TIA 
Physical Therapy  Physical Therapy Rx    Name: Jerome Steel  : 1960  MRN: [id number]      Date of Service: 2025    Evaluating PT:  Nahun Collazo, PT, DPT [id number]    Room #:  8617/6517-A  Diagnosis:  Coronary artery disease involving native coronary artery of native heart without angina pectoris [I25.10]  Coronary artery disease, unspecified vessel or lesion type, unspecified whether angina present, unspecified whether native or transplanted heart [I25.10]  PMHx/PSHx:    Past Medical History:   Diagnosis Date    Abscess of prostate     Anemia     Arthritis     Atherosclerotic heart disease of native coronary artery without angina pectoris     Bilateral carpal tunnel syndrome     Cerebral artery occlusion with cerebral infarction ([facility])     Chronic back pain     COPD (chronic obstructive pulmonary disease) ([facility])     Coronary artery disease involving native coronary artery of native heart without angina pectoris 2022    CVA (cerebral vascular accident) ([facility]) 2015,     Diabetes mellitus ([facility])     Type 2    Diabetic foot ulcer with osteomyelitis ([facility]) 2021    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 2 diabetes mellitus, with necrosis of bone 2021    Disease of multiple valves of heart     Dysphagia     EtOH dependence ([facility])     Falls     GERD (gastroesophageal reflux disease)     Hemiparesis affecting left side as late effect of cerebrovascular accident ([facility])     LEFT SIDE NON DOMINANT FOLLOWING STROKE    Hydrocele     Hyperlipidemia     Hypertension     Hypothyroid     Inguinal hernia     MI (myocardial infarction) ([facility])     Moderate aortic regurgitation     Multiple fractures of rib involving four or more ribs     left    Obstructive and reflux uropathy     Osteomyelitis, ankle and foot ([facility])     left    Paralytic gait     Peripheral vascular angioplasty status 2021    PFO (patent foramen ovale)     Psychosis ([facility])     Schizophrenia     Spondylosis     TIA 
Physical Therapy  Physical Therapy Rx    Name: Jerome Steel  : 1960  MRN: [id number]      Date of Service: 2025    Evaluating PT:  Nahun Collazo, PT, DPT [id number]    Room #:  9217/6517-A  Diagnosis:  Coronary artery disease involving native coronary artery of native heart without angina pectoris [I25.10]  Coronary artery disease, unspecified vessel or lesion type, unspecified whether angina present, unspecified whether native or transplanted heart [I25.10]  PMHx/PSHx:    Past Medical History:   Diagnosis Date    Abscess of prostate     Anemia     Arthritis     Atherosclerotic heart disease of native coronary artery without angina pectoris     Bilateral carpal tunnel syndrome     Cerebral artery occlusion with cerebral infarction ([facility])     Chronic back pain     COPD (chronic obstructive pulmonary disease) ([facility])     Coronary artery disease involving native coronary artery of native heart without angina pectoris 2022    CVA (cerebral vascular accident) ([facility]) 2015,     Diabetes mellitus ([facility])     Type 2    Diabetic foot ulcer with osteomyelitis ([facility]) 2021    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 2 diabetes mellitus, with necrosis of bone 2021    Disease of multiple valves of heart     Dysphagia     EtOH dependence ([facility])     Falls     GERD (gastroesophageal reflux disease)     Hemiparesis affecting left side as late effect of cerebrovascular accident ([facility])     LEFT SIDE NON DOMINANT FOLLOWING STROKE    Hydrocele     Hyperlipidemia     Hypertension     Hypothyroid     Inguinal hernia     MI (myocardial infarction) ([facility])     Moderate aortic regurgitation     Multiple fractures of rib involving four or more ribs     left    Obstructive and reflux uropathy     Osteomyelitis, ankle and foot ([facility])     left    Paralytic gait     Peripheral vascular angioplasty status 2021    PFO (patent foramen ovale)     Psychosis ([facility])     Schizophrenia     Spondylosis     TIA 
Planned Operative Procedure: Pump assist CABG x 2 (LIMA-LAD, SVG-LPL)   Surgeon: Baltazar Sterling MD        Pertinent History: CAD previously refused CABG 2023 at , Aortic stenosis, HTN, HLD, PVD with arthrectomies and arthroplasties, DM, hypothyroidism, prior ETOH abuse, CVA 2015 with residual right hemiparesis, subarachnoid hemorrhage 2024, GERD, right inguinal incarcerated hernia, right heel diabetic foot ulcer, falls, prostate abscess with chronic urinary catheter, psychosis and schizophrenia     Estimated body mass index is 23.82 kg/m² as calculated from the following:    Height as of 4/10/25: 1.778 m (5' 10\").    Weight as of 4/10/25: 75.3 kg (166 lb).      Pre-Operative Testing Review:   --Carotid US: Right: 50-69% stenosis Left: no significant stenosis   --MAHOGANY: Left MAHOGANY 1.5, indicating abnormal calcification, Right MAHOGANY: undetermined/?unable to be completed  --Chest CT: reviewed; ascending aortic calcifications noted; hence planned pump assist without clamping; with significant calcification as well as severe left subclavian stenosis--vasc surg consulted in Feb with no acute intervention planned; pt refused angiogram  --LYRIC: no significant LV dysfunction (EF ~60-65%); AV: moderate stenosis--plan for likely future TAVR; dilated aortic root.  --PFT: FEV1 86 percent of predicted and DLCOunc 54 percent of predicted  --Vascular duplex LE vein mapping: completed and read  --Vascular UE arterial segmental pressures with PPG: good collateral flow from right ulnar artery;  LEFT: after the left radial artery compression, moderate decrease in the amplitude noted over the left thumb and left fifth digit without any change of the left third and fourth digits indicating some compromise of collateral flow but overall adequate flow noted to the fingers of the left hand from the collaterals from the left ulnar artery  --Bilateral arm blood pressures with MAP: Right arm BP: 104/53 (MAP 70)      Left arm BP: 122/62 (MAP 82) 
Podiatry, Cardiology and Endocrinology were consulted @ 10:57 am.  
Pt extubated at this time. Lungs and oral airway suctioned, cuff deflated and ETT pulled. Pt is alert and has a good cough. No stridor auscultated, breath sounds clear bilaterally. Placed on 5L NC, Sp02 99%. Will continue to monitor patient.  
Spiritual Health History and Assessment/Progress Note  [facility]    (P) Spiritual/Emotional Needs,  ,  ,      Name: eJrome Steel MRN: [id number]    Age: 64 y.o.     Sex: male   Language: English   Pentecostalism: Taoist   Coronary artery disease involving native coronary artery of native heart without angina pectoris     Date: 4/22/2025                           Spiritual Assessment began in [facility]        Referral/Consult From: (P) Rounding   Encounter Overview/Reason: (P) Spiritual/Emotional Needs  Service Provided For: (P) Patient    Yee, Belief, Meaning:   Patient identifies as spiritual  Family/Friends No family/friends present      Importance and Influence:  Patient has spiritual/personal beliefs that influence decisions regarding their health  Family/Friends No family/friends present    Community:  Patient feels well-supported. Support system includes: Friends and Extended family  Family/Friends No family/friends present    Assessment and Plan of Care:     Patient Interventions include: Facilitated expression of thoughts and feelings and Explored spiritual coping/struggle/distress  Family/Friends Interventions include: No family/friends present    Patient Plan of Care: Spiritual Care available upon further referral  Family/Friends Plan of Care: No family/friends present    Electronically signed by JACOB GEIGER on 4/22/2025 at 1:47 PM   
Urology consult called.  
consulted postop  - SSI, lantus      6. RLE diabetic heel wound  - Podiatry following  - Dressing: Santyl to foot daily, with dry sterile dressing     7. Bradycardia  - Vpaced postop for bradycardia; pacer place to VVI backup @ 60     8. Acute Blood Loss Anemia  - H/H 7.6/23.5, monitor      VTE Prophylaxis: Pharmacologic/Mechanical:  Yes, SCDs, lovenox   Line infection prevention: Can CVC or arterial line be removed: Yes, prior to transfer  Continued need for urinary catheter:  Chronic urinary catheter, do not remove      Dispo: [facility]     Electronically signed by ALBERT CASTANON CNP on 4/18/2025 at 8:13 AM    
obtained; as soon as today               
[phone]   Phone: [phone]  Fax: [phone]  --------------------------------------------  An electronic signature was used to authenticate this note. Aki Linda MD on 4/19/2025 at 10:26 AM     
with each stand despite max cues                     Stand by Assist       Functional Mobility Maximal Assist x2  Allen HHA from bed to bedside chair.  Unable                         Stand by Assist w/ use of Appropriate AD         Balance Sitting:     Static:  Minimal Assist     Dynamic:Minimal Assist      Standing:    Static:  Maximal Assist x2 w/ allen HHA    Dynamic:Maximal Assist x2 w/ allen HHA Sitting:     Static:  Minimal Assist     Dynamic:Minimal Assist      Standing:    Static:  Maximal Assist x2 w/ allen HHA    Dynamic:NT  Sitting:     Static:  Independent     Dynamic:Independent      Standing:    Static:  Stand by Assist     Dynamic:Stand by Assist             Endurance/Activity Tolerance fair  w/ light activity  fair-  w/ light activity                     good  w/ light to mod activity   Visual/  Perceptual WFL            Comments:  Cleared by RN to see pt. Upon arrival patient sitting in chair and agreeable to OT session after min encouragement. At end of session, patient sitting in chair with call light and phone within reach, all lines and tubes intact.  Extensive time required for rest breaks and recovery. Overall patient demonstrated decreased independence and safety during completion of ADL and transitional movements    Treatment: includes above grid, therapist facilitated ADL tasks, bed mobility to address safety awareness, implementation of fall prevention strategies, & safety awareness throughout ADLs. Pt would benefit from continued skilled OT to increase safety and independence with completion of ADL/IADL tasks for functional independence and quality of life.    Education: Pt educated on role of OT in acute setting, benefits of participation in ADL tasks, compensatory methods to adhere to sternal precautions, use of call light in room, EC/WS tasks.     Pt has made slow progress towards set goals.     Continue with current plan of care    Treatment Time In:1000            Treatment Time Out: 1030

## 2025-04-25 NOTE — DISCHARGE SUMMARY
Physician Discharge Summary     Patient ID:  Jerome Steel  [id number]  64 y.o.  1960    Admit date: 4/17/2025    Discharge date and time: 4/24/2025  4:22 PM     Admitting Physician: Baltazar Sterling MD     Discharge Physician: Baltazar Sterling MD    Discharge Diagnoses:   Pump assist CABG x 2 (LIMA-LAD, SVG-OM2), MAHAD exclusion with 40mm AtriClip, Posterior pericardotomy, EVH LLE on 4/17/2025     PROCEDURES PERFORMED:    1. Sternotomy.  2. Pump-assist coronary artery bypass grafting x2; left internal mammary artery to the left anterior descending, saphenous vein graft to the obtuse marginal #2.  3. Left atrial appendage exclusion with a 40 mm AtriClip.  4. Posterior pericardiotomy.  5. Endoscopic harvesting of left lower extremity greater saphenous vein.  6. Rigid internal fixation of sternum with William plates x2.  7. Twenty-two modifier.    Discharged Condition: stable    Indication for Admission:   Severe multivessel coronary artery disease, critical left main coronary artery stenosis, not amenable to percutaneous coronary intervention; history of heavy alcohol abuse; cirrhosis; peripheral vascular disease; moderate aortic stenosis; mild-to-moderate mitral regurgitation.     Hospital Course: Course as above, and the patient underwent Pump assist CABG x 2 (LIMA-LAD, SVG-OM2), MAHAD exclusion with 40mm AtriClip, Posterior pericardotomy, EVH LLE on 4/17/2025. Postoperatively, he was transferred to the CVICU in guarded stable condition and extubated once indications met. Once appropriate, he was transferred to the monitored stepdown unit and beta blockade was initiated. The mediastinal/pleural chest tubes, and epicardial pacing wires were discontinued in the usual fashion. His urinary catheter remained with plans for urology follow up outpatient. Supplemental oxygen was weaned off, he was deconditioned requiring continued PT/OT, and he was deemed ready for discharge. He was discharged to rehab on POD 7 (4/24/25) in

## 2025-05-09 ENCOUNTER — HOSPITAL ENCOUNTER (EMERGENCY)
Age: 65
Discharge: HOME | End: 2025-05-09
Attending: EMERGENCY MEDICINE
Payer: COMMERCIAL

## 2025-05-09 ENCOUNTER — APPOINTMENT (OUTPATIENT)
Dept: GENERAL RADIOLOGY | Age: 65
End: 2025-05-09
Payer: COMMERCIAL

## 2025-05-09 VITALS
TEMPERATURE: 98.2 F | RESPIRATION RATE: 28 BRPM | OXYGEN SATURATION: 96 % | DIASTOLIC BLOOD PRESSURE: 69 MMHG | SYSTOLIC BLOOD PRESSURE: 133 MMHG | HEART RATE: 99 BPM

## 2025-05-09 DIAGNOSIS — R53.83 FATIGUE, UNSPECIFIED TYPE: ICD-10-CM

## 2025-05-09 DIAGNOSIS — J06.9 VIRAL URI: ICD-10-CM

## 2025-05-09 DIAGNOSIS — N39.0 URINARY TRACT INFECTION WITHOUT HEMATURIA, SITE UNSPECIFIED: Primary | ICD-10-CM

## 2025-05-09 LAB
ALBUMIN SERPL-MCNC: 3.2 G/DL (ref 3.5–5.2)
ALP SERPL-CCNC: 332 U/L (ref 40–129)
ALT SERPL-CCNC: 29 U/L (ref 0–50)
AMPHET UR QL SCN: NEGATIVE
ANION GAP SERPL CALCULATED.3IONS-SCNC: 14 MMOL/L (ref 7–16)
APAP SERPL-MCNC: <5 UG/ML (ref 10–30)
AST SERPL-CCNC: 30 U/L (ref 0–50)
B PARAP IS1001 DNA NPH QL NAA+NON-PROBE: NOT DETECTED
B PERT DNA SPEC QL NAA+PROBE: NOT DETECTED
BACTERIA URNS QL MICRO: ABNORMAL
BARBITURATES UR QL SCN: NEGATIVE
BASOPHILS # BLD: 0 K/UL (ref 0–0.2)
BASOPHILS NFR BLD: 0 % (ref 0–2)
BENZODIAZ UR QL: NEGATIVE
BILIRUB SERPL-MCNC: 0.8 MG/DL (ref 0–1.2)
BILIRUB UR QL STRIP: NEGATIVE
BUN SERPL-MCNC: 17 MG/DL (ref 8–23)
BUPRENORPHINE UR QL: NEGATIVE
C PNEUM DNA NPH QL NAA+NON-PROBE: NOT DETECTED
CALCIUM SERPL-MCNC: 9.2 MG/DL (ref 8.8–10.2)
CANNABINOIDS UR QL SCN: NEGATIVE
CHLORIDE SERPL-SCNC: 99 MMOL/L (ref 98–107)
CLARITY UR: ABNORMAL
CO2 SERPL-SCNC: 21 MMOL/L (ref 22–29)
COCAINE UR QL SCN: NEGATIVE
COLOR UR: YELLOW
CREAT SERPL-MCNC: 0.7 MG/DL (ref 0.7–1.2)
EKG ATRIAL RATE: 108 BPM
EKG P AXIS: 28 DEGREES
EKG P-R INTERVAL: 166 MS
EKG Q-T INTERVAL: 354 MS
EKG QRS DURATION: 82 MS
EKG QTC CALCULATION (BAZETT): 474 MS
EKG R AXIS: 2 DEGREES
EKG T AXIS: -30 DEGREES
EKG VENTRICULAR RATE: 108 BPM
EOSINOPHIL # BLD: 0.12 K/UL (ref 0.05–0.5)
EOSINOPHILS RELATIVE PERCENT: 1 % (ref 0–6)
ERYTHROCYTE [DISTWIDTH] IN BLOOD BY AUTOMATED COUNT: 15.5 % (ref 11.5–15)
ETHANOLAMINE SERPL-MCNC: <10 MG/DL (ref 0–0.08)
FENTANYL UR QL: NEGATIVE
FLUAV RNA NPH QL NAA+NON-PROBE: NOT DETECTED
FLUBV RNA NPH QL NAA+NON-PROBE: NOT DETECTED
GFR, ESTIMATED: >90 ML/MIN/1.73M2
GLUCOSE BLD-MCNC: 171 MG/DL
GLUCOSE BLD-MCNC: 171 MG/DL (ref 74–99)
GLUCOSE SERPL-MCNC: 168 MG/DL (ref 74–99)
GLUCOSE UR STRIP-MCNC: NEGATIVE MG/DL
HADV DNA NPH QL NAA+NON-PROBE: NOT DETECTED
HCOV 229E RNA NPH QL NAA+NON-PROBE: NOT DETECTED
HCOV HKU1 RNA NPH QL NAA+NON-PROBE: NOT DETECTED
HCOV NL63 RNA NPH QL NAA+NON-PROBE: DETECTED
HCOV OC43 RNA NPH QL NAA+NON-PROBE: NOT DETECTED
HCT VFR BLD AUTO: 28.8 % (ref 37–54)
HGB BLD-MCNC: 9.5 G/DL (ref 12.5–16.5)
HGB UR QL STRIP.AUTO: ABNORMAL
HMPV RNA NPH QL NAA+NON-PROBE: NOT DETECTED
HPIV1 RNA NPH QL NAA+NON-PROBE: NOT DETECTED
HPIV2 RNA NPH QL NAA+NON-PROBE: NOT DETECTED
HPIV3 RNA NPH QL NAA+NON-PROBE: NOT DETECTED
HPIV4 RNA NPH QL NAA+NON-PROBE: NOT DETECTED
KETONES UR STRIP-MCNC: NEGATIVE MG/DL
LACTATE BLDV-SCNC: 1.4 MMOL/L (ref 0.5–1.9)
LACTATE BLDV-SCNC: 1.4 MMOL/L (ref 0.5–1.9)
LEUKOCYTE ESTERASE UR QL STRIP: ABNORMAL
LYMPHOCYTES NFR BLD: 0.36 K/UL (ref 1.5–4)
LYMPHOCYTES RELATIVE PERCENT: 3 % (ref 20–42)
M PNEUMO DNA NPH QL NAA+NON-PROBE: NOT DETECTED
MCH RBC QN AUTO: 29.1 PG (ref 26–35)
MCHC RBC AUTO-ENTMCNC: 33 G/DL (ref 32–34.5)
MCV RBC AUTO: 88.1 FL (ref 80–99.9)
METHADONE UR QL: NEGATIVE
MONOCYTES NFR BLD: 0.36 K/UL (ref 0.1–0.95)
MONOCYTES NFR BLD: 3 % (ref 2–12)
NEUTROPHILS NFR BLD: 94 % (ref 43–80)
NEUTS SEG NFR BLD: 12.96 K/UL (ref 1.8–7.3)
NITRITE UR QL STRIP: POSITIVE
OPIATES UR QL SCN: NEGATIVE
OXYCODONE UR QL SCN: POSITIVE
PCP UR QL SCN: NEGATIVE
PH UR STRIP: 6.5 [PH] (ref 5–8)
PLATELET # BLD AUTO: 368 K/UL (ref 130–450)
PMV BLD AUTO: 9.5 FL (ref 7–12)
POTASSIUM SERPL-SCNC: 3.8 MMOL/L (ref 3.5–5.1)
PROT SERPL-MCNC: 7.3 G/DL (ref 6.4–8.3)
PROT UR STRIP-MCNC: ABNORMAL MG/DL
RBC # BLD AUTO: 3.27 M/UL (ref 3.8–5.8)
RBC # BLD: ABNORMAL 10*6/UL
RBC #/AREA URNS HPF: ABNORMAL /HPF
RSV RNA NPH QL NAA+NON-PROBE: NOT DETECTED
RV+EV RNA NPH QL NAA+NON-PROBE: NOT DETECTED
SALICYLATES SERPL-MCNC: <0.5 MG/DL (ref 0–30)
SARS-COV-2 RNA NPH QL NAA+NON-PROBE: NOT DETECTED
SODIUM SERPL-SCNC: 134 MMOL/L (ref 136–145)
SP GR UR STRIP: 1.01 (ref 1–1.03)
SPECIMEN DESCRIPTION: ABNORMAL
TEST INFORMATION: ABNORMAL
TOXIC TRICYCLIC SC,BLOOD: NEGATIVE
UROBILINOGEN UR STRIP-ACNC: 1 EU/DL (ref 0–1)
WBC #/AREA URNS HPF: ABNORMAL /HPF
WBC OTHER # BLD: 13.8 K/UL (ref 4.5–11.5)

## 2025-05-09 PROCEDURE — 93010 ELECTROCARDIOGRAM REPORT: CPT | Performed by: INTERNAL MEDICINE

## 2025-05-09 PROCEDURE — 83605 ASSAY OF LACTIC ACID: CPT

## 2025-05-09 PROCEDURE — 6360000002 HC RX W HCPCS

## 2025-05-09 PROCEDURE — 93005 ELECTROCARDIOGRAM TRACING: CPT

## 2025-05-09 PROCEDURE — 87077 CULTURE AEROBIC IDENTIFY: CPT

## 2025-05-09 PROCEDURE — 87086 URINE CULTURE/COLONY COUNT: CPT

## 2025-05-09 PROCEDURE — 96374 THER/PROPH/DIAG INJ IV PUSH: CPT

## 2025-05-09 PROCEDURE — G0480 DRUG TEST DEF 1-7 CLASSES: HCPCS

## 2025-05-09 PROCEDURE — 36415 COLL VENOUS BLD VENIPUNCTURE: CPT

## 2025-05-09 PROCEDURE — 85025 COMPLETE CBC W/AUTO DIFF WBC: CPT

## 2025-05-09 PROCEDURE — 0202U NFCT DS 22 TRGT SARS-COV-2: CPT

## 2025-05-09 PROCEDURE — 80307 DRUG TEST PRSMV CHEM ANLYZR: CPT

## 2025-05-09 PROCEDURE — 87040 BLOOD CULTURE FOR BACTERIA: CPT

## 2025-05-09 PROCEDURE — 81001 URINALYSIS AUTO W/SCOPE: CPT

## 2025-05-09 PROCEDURE — 71045 X-RAY EXAM CHEST 1 VIEW: CPT

## 2025-05-09 PROCEDURE — 2580000003 HC RX 258

## 2025-05-09 PROCEDURE — 96361 HYDRATE IV INFUSION ADD-ON: CPT

## 2025-05-09 PROCEDURE — 80143 DRUG ASSAY ACETAMINOPHEN: CPT

## 2025-05-09 PROCEDURE — 82962 GLUCOSE BLOOD TEST: CPT

## 2025-05-09 PROCEDURE — 87150 DNA/RNA AMPLIFIED PROBE: CPT

## 2025-05-09 PROCEDURE — 80053 COMPREHEN METABOLIC PANEL: CPT

## 2025-05-09 PROCEDURE — 2500000003 HC RX 250 WO HCPCS

## 2025-05-09 PROCEDURE — 99285 EMERGENCY DEPT VISIT HI MDM: CPT

## 2025-05-09 PROCEDURE — 80179 DRUG ASSAY SALICYLATE: CPT

## 2025-05-09 RX ORDER — CEFDINIR 300 MG/1
300 CAPSULE ORAL 2 TIMES DAILY
Qty: 14 CAPSULE | Refills: 0 | Status: ON HOLD | OUTPATIENT
Start: 2025-05-09 | End: 2025-05-16

## 2025-05-09 RX ORDER — 0.9 % SODIUM CHLORIDE 0.9 %
1000 INTRAVENOUS SOLUTION INTRAVENOUS ONCE
Status: COMPLETED | OUTPATIENT
Start: 2025-05-09 | End: 2025-05-09

## 2025-05-09 RX ADMIN — SODIUM CHLORIDE 1000 ML: 9 INJECTION, SOLUTION INTRAVENOUS at 11:15

## 2025-05-09 RX ADMIN — SODIUM CHLORIDE 1000 ML: 9 INJECTION, SOLUTION INTRAVENOUS at 13:25

## 2025-05-09 RX ADMIN — WATER 2000 MG: 1 INJECTION INTRAMUSCULAR; INTRAVENOUS; SUBCUTANEOUS at 11:21

## 2025-05-09 NOTE — ED PROVIDER NOTES
[facility] EMERGENCY DEPARTMENT  EMERGENCY DEPARTMENT ENCOUNTER        Pt Name: Jerome Steel  MRN: [id number]  Birthdate 1960  Date of evaluation: 5/9/2025  Provider: Yousuf Mcgill MD  PCP: Genet Kraus DO  Note Started: 9:22 AM EDT 5/9/25    CHIEF COMPLAINT       Chief Complaint   Patient presents with    Altered Mental Status     From NH, more altered kasandra normal \"not acting like self\" per nursing staff. NH found 3 crushed gabapentin at bedside. Pinpoint pupils, post CABG       HISTORY OF PRESENT ILLNESS: 1 or more Elements   History From: Patient    Limitations to history : None    Jerome Steel is a 64 y.o. male with past medical history of hypertension, type 2 diabetes mellitus, alcohol abuse, CAD status post CABG, hypertension, peripheral artery disease, hyperlipidemia, iron deficiency anemia, chronic low back pain, intracranial hemorrhage, opiate dependence who presents from nursing home with complaints of altered mental status.  Per nursing home staff patient is typically alert and oriented x 3 with a GCS of 15 and is typically \"more flirty than today\".  Patient denies any falls or trauma.  Patient arrives alert and orient x 3 with a GCS of 15.  Patient states he hurts all over but states this is chronic for him.  Patient denies any new pain complaints at this time.  Patient denies any fevers or chills, headache, blurry vision, neck pain or neck stiffness, chest pain or shortness of breath, abdominal pain, nausea, vomiting, diarrhea, black or bloody stools.  Patient is status post CABG at this time with discharge date on 4/25/2025.  Patient denies any falls or trauma.  Patient denies any tobacco, EtOH, drug use.  Of note, nursing home staff states they found crushed up gabapentin next to his bed and believes patient could have been utilizing this.  Patient states he is just tired at this time.    Nursing Notes were all reviewed and agreed with or any  Detected     Streptococcus agalactiae (Group B) DETECTED (A)     Streptococcus pneumoniae Not Detected     Streptococcus pyogenes Not Detected    Culture, Blood 2    Specimen: Blood   Result Value Ref Range    Specimen Description .BLOOD .ARM     Special Requests          Direct Exam       DIRECT GRAM STAIN FROM BOTTLE: GRAM POSITIVE COCCI IN CHAINS Previous panic on this admission, call not needed per  SOP.    Culture CULTURE IN PROGRESS    Culture, Urine    Specimen: Urine, clean catch   Result Value Ref Range    Specimen Description .CLEAN CATCH URINE .URINE, CATHETERIZED     Special Requests Site: Urine     Culture (A)      GRAM NEGATIVE RODS >100,000 CFU/ML Identification and sensitivity to follow.    Culture Evaluating for:     Culture Gram Positive Organism (A)    Respiratory Panel, Molecular, with COVID-19 (Restricted: peds pts or suitable admitted adults)    Specimen: Nasopharyngeal Swab   Result Value Ref Range    Specimen Description .NASOPHARYNGEAL SWAB     Adenovirus PCR Not Detected Not Detected    Coronavirus 229E PCR Not Detected Not Detected    Coronavirus HKU1 PCR Not Detected Not Detected    Coronavirus NL63 PCR DETECTED (A) Not Detected    Coronavirus OC43 PCR Not Detected Not Detected    SARS-CoV-2, PCR Not Detected Not Detected    Human Metapneumovirus PCR Not Detected Not Detected    Rhino/Enterovirus PCR Not Detected Not Detected    Influenza A by PCR Not Detected Not Detected    Influenza B by PCR Not Detected Not Detected    Parainfluenza 1 PCR Not Detected Not Detected    Parainfluenza 2 PCR Not Detected Not Detected    Parainfluenza 3 PCR Not Detected Not Detected    Parainfluenza 4 PCR Not Detected Not Detected    Resp Syncytial Virus PCR Not Detected Not Detected    Bordetella parapertussis by PCR Not Detected Not Detected    B Pertussis by PCR Not Detected Not Detected    Chlamydia pneumoniae By PCR Not Detected Not Detected    Mycoplasma pneumo by PCR Not Detected Not Detected   SERUM

## 2025-05-09 NOTE — CARE COORDINATION
Social Work/ Case Management Transition of Care Planning (Leticia Worley, ANGELO [phone]):     Pt presented to the ED with AMS. Pt recently discharged from this facility on 04/25/25. Pt is from [facility] and will return there when medically clear for discharge.   ANGELO Curiel  5/9/2025

## 2025-05-10 NOTE — ED PROVIDER NOTES
ATTENDING PROVIDER ATTESTATION:     Jerome Steel presented to the emergency department for evaluation of altered mental status and was initially evaluated by the resident physician. See Original ED Note for H&P and ED course above.     I have reviewed and discussed the case, including pertinent history (medical, surgical, family and social) and exam findings with the resident.  I have personally performed and/or participated in the history, exam, medical decision making, and procedures and agree with all pertinent clinical information except for any differences as noted below. I was present for all key portions of any procedures performed during the ED course. I personally reviewed any EKG obtained and agree with the resident interpretation unless stated otherwise below.   I have reviewed my findings and recommendations with the assigned resident, the patient, and members of family present at the time of disposition.      ED Course as of 05/09/25 2141   Fri May 09, 2025   0929 NIH Stroke Scale/Score at time of initial evaluation:  1A: Level of Consciousness 0 - alert; keenly responsive  1B: Ask Month and Age 0 - answers both questions correctly  1C: Tell Patient To Open and Close Eyes, then Hand  Squeeze 0 - performs both tasks correctly  2: Test Horizontal Extraocular Movements 0 - normal  3: Test Visual Fields 0 - no visual loss  4: Test Facial Palsy 0 - normal symmetric movement  5A: Test Left Arm Motor Drift 0 - no drift, limb holds 90 (or 45) degrees for full 10 seconds  5B: Test Right Arm Motor Drift 0 - no drift, limb holds 90 (or 45) degrees for full 10 seconds  6A: Test Left Leg Motor Drift 0 - no drift; leg holds 30 degree position for full 5 seconds  6B: Test Right Leg Motor Drift 0 - no drift; leg holds 30 degree position for full 5 seconds  7: Test Limb Ataxia   (FNF/Heel-Shin) 0 - absent  8: Test Sensation 0 - normal; no sensory loss  9: Test Language/Aphasia 0 - no aphasia, normal  10: Test

## 2025-05-11 ENCOUNTER — RESULTS FOLLOW-UP (OUTPATIENT)
Dept: PHARMACY | Age: 65
End: 2025-05-11

## 2025-05-11 LAB
ACB COMPLEX DNA BLD POS QL NAA+NON-PROBE: NOT DETECTED
B FRAGILIS DNA BLD POS QL NAA+NON-PROBE: NOT DETECTED
BIOFIRE TEST COMMENT: ABNORMAL
C ALBICANS DNA BLD POS QL NAA+NON-PROBE: NOT DETECTED
C AURIS DNA BLD POS QL NAA+NON-PROBE: NOT DETECTED
C GATTII+NEOFOR DNA BLD POS QL NAA+N-PRB: NOT DETECTED
C GLABRATA DNA BLD POS QL NAA+NON-PROBE: NOT DETECTED
C KRUSEI DNA BLD POS QL NAA+NON-PROBE: NOT DETECTED
C PARAP DNA BLD POS QL NAA+NON-PROBE: NOT DETECTED
C TROPICLS DNA BLD POS QL NAA+NON-PROBE: NOT DETECTED
E CLOAC COMP DNA BLD POS NAA+NON-PROBE: NOT DETECTED
E COLI DNA BLD POS QL NAA+NON-PROBE: NOT DETECTED
E FAECALIS DNA BLD POS QL NAA+NON-PROBE: NOT DETECTED
E FAECIUM DNA BLD POS QL NAA+NON-PROBE: NOT DETECTED
ENTEROBACTERALES DNA BLD POS NAA+N-PRB: NOT DETECTED
GP B STREP DNA BLD POS QL NAA+NON-PROBE: DETECTED
HAEM INFLU DNA BLD POS QL NAA+NON-PROBE: NOT DETECTED
K OXYTOCA DNA BLD POS QL NAA+NON-PROBE: NOT DETECTED
KLEBSIELLA SP DNA BLD POS QL NAA+NON-PRB: NOT DETECTED
KLEBSIELLA SP DNA BLD POS QL NAA+NON-PRB: NOT DETECTED
L MONOCYTOG DNA BLD POS QL NAA+NON-PROBE: NOT DETECTED
MICROORGANISM SPEC CULT: ABNORMAL
MICROORGANISM/AGENT SPEC: ABNORMAL
MICROORGANISM/AGENT SPEC: ABNORMAL
N MEN DNA BLD POS QL NAA+NON-PROBE: NOT DETECTED
P AERUGINOSA DNA BLD POS NAA+NON-PROBE: NOT DETECTED
PROTEUS SP DNA BLD POS QL NAA+NON-PROBE: NOT DETECTED
S AUREUS DNA BLD POS QL NAA+NON-PROBE: NOT DETECTED
S AUREUS+CONS DNA BLD POS NAA+NON-PROBE: NOT DETECTED
S EPIDERMIDIS DNA BLD POS QL NAA+NON-PRB: NOT DETECTED
S LUGDUNENSIS DNA BLD POS QL NAA+NON-PRB: NOT DETECTED
S MALTOPHILIA DNA BLD POS QL NAA+NON-PRB: NOT DETECTED
S MARCESCENS DNA BLD POS NAA+NON-PROBE: NOT DETECTED
S PNEUM DNA BLD POS QL NAA+NON-PROBE: NOT DETECTED
S PYO DNA BLD POS QL NAA+NON-PROBE: NOT DETECTED
SALMONELLA DNA BLD POS QL NAA+NON-PROBE: NOT DETECTED
SERVICE CMNT-IMP: ABNORMAL
SPECIMEN DESCRIPTION: ABNORMAL
STREPTOCOCCUS DNA BLD POS NAA+NON-PROBE: DETECTED

## 2025-05-12 ENCOUNTER — HOSPITAL ENCOUNTER (INPATIENT)
Age: 65
LOS: 8 days | Discharge: HOME | DRG: 616 | End: 2025-05-23
Attending: EMERGENCY MEDICINE | Admitting: INTERNAL MEDICINE
Payer: COMMERCIAL

## 2025-05-12 DIAGNOSIS — R78.81 BACTEREMIA: Primary | ICD-10-CM

## 2025-05-12 DIAGNOSIS — G89.18 ACUTE POST-OPERATIVE PAIN: ICD-10-CM

## 2025-05-12 DIAGNOSIS — E11.9 TYPE 2 DIABETES MELLITUS WITHOUT COMPLICATION, WITHOUT LONG-TERM CURRENT USE OF INSULIN (HCC): ICD-10-CM

## 2025-05-12 DIAGNOSIS — I73.9 PERIPHERAL VASCULAR DISEASE, UNSPECIFIED: ICD-10-CM

## 2025-05-12 DIAGNOSIS — S91.301D OPEN WOUND OF RIGHT HEEL, SUBSEQUENT ENCOUNTER: ICD-10-CM

## 2025-05-12 DIAGNOSIS — I73.9 PAD (PERIPHERAL ARTERY DISEASE): ICD-10-CM

## 2025-05-12 LAB
ALBUMIN SERPL-MCNC: 3 G/DL (ref 3.5–5.2)
ALP SERPL-CCNC: 210 U/L (ref 40–129)
ALT SERPL-CCNC: 24 U/L (ref 0–50)
ANION GAP SERPL CALCULATED.3IONS-SCNC: 12 MMOL/L (ref 7–16)
AST SERPL-CCNC: 33 U/L (ref 0–50)
BACTERIA URNS QL MICRO: ABNORMAL
BASOPHILS # BLD: 0.03 K/UL (ref 0–0.2)
BASOPHILS NFR BLD: 1 % (ref 0–2)
BILIRUB SERPL-MCNC: 0.4 MG/DL (ref 0–1.2)
BILIRUB UR QL STRIP: NEGATIVE
BUN SERPL-MCNC: 19 MG/DL (ref 8–23)
CALCIUM SERPL-MCNC: 8.8 MG/DL (ref 8.8–10.2)
CHLORIDE SERPL-SCNC: 103 MMOL/L (ref 98–107)
CHP ED QC CHECK: NORMAL
CLARITY UR: CLEAR
CO2 SERPL-SCNC: 21 MMOL/L (ref 22–29)
COLOR UR: YELLOW
CREAT SERPL-MCNC: 0.6 MG/DL (ref 0.7–1.2)
EOSINOPHIL # BLD: 0.27 K/UL (ref 0.05–0.5)
EOSINOPHILS RELATIVE PERCENT: 5 % (ref 0–6)
ERYTHROCYTE [DISTWIDTH] IN BLOOD BY AUTOMATED COUNT: 15.8 % (ref 11.5–15)
GFR, ESTIMATED: >90 ML/MIN/1.73M2
GLUCOSE BLD-MCNC: 176 MG/DL (ref 74–99)
GLUCOSE BLD-MCNC: 227 MG/DL
GLUCOSE BLD-MCNC: 227 MG/DL (ref 74–99)
GLUCOSE SERPL-MCNC: 132 MG/DL (ref 74–99)
GLUCOSE UR STRIP-MCNC: NEGATIVE MG/DL
HCT VFR BLD AUTO: 26.4 % (ref 37–54)
HGB BLD-MCNC: 8.4 G/DL (ref 12.5–16.5)
HGB UR QL STRIP.AUTO: NEGATIVE
IMM GRANULOCYTES # BLD AUTO: <0.03 K/UL (ref 0–0.58)
IMM GRANULOCYTES NFR BLD: 0 % (ref 0–5)
KETONES UR STRIP-MCNC: NEGATIVE MG/DL
LACTATE BLDV-SCNC: 1 MMOL/L (ref 0.5–1.9)
LEUKOCYTE ESTERASE UR QL STRIP: ABNORMAL
LYMPHOCYTES NFR BLD: 0.86 K/UL (ref 1.5–4)
LYMPHOCYTES RELATIVE PERCENT: 15 % (ref 20–42)
MCH RBC QN AUTO: 27.8 PG (ref 26–35)
MCHC RBC AUTO-ENTMCNC: 31.8 G/DL (ref 32–34.5)
MCV RBC AUTO: 87.4 FL (ref 80–99.9)
MONOCYTES NFR BLD: 0.5 K/UL (ref 0.1–0.95)
MONOCYTES NFR BLD: 9 % (ref 2–12)
NEUTROPHILS NFR BLD: 70 % (ref 43–80)
NEUTS SEG NFR BLD: 3.98 K/UL (ref 1.8–7.3)
NITRITE UR QL STRIP: NEGATIVE
PH UR STRIP: 6 [PH] (ref 5–8)
PLATELET # BLD AUTO: 285 K/UL (ref 130–450)
PMV BLD AUTO: 9.6 FL (ref 7–12)
POTASSIUM SERPL-SCNC: 3.6 MMOL/L (ref 3.5–5.1)
PROCALCITONIN SERPL-MCNC: 0.28 NG/ML (ref 0–0.08)
PROT SERPL-MCNC: 6.5 G/DL (ref 6.4–8.3)
PROT UR STRIP-MCNC: 30 MG/DL
RBC # BLD AUTO: 3.02 M/UL (ref 3.8–5.8)
RBC #/AREA URNS HPF: ABNORMAL /HPF
SODIUM SERPL-SCNC: 135 MMOL/L (ref 136–145)
SP GR UR STRIP: 1.02 (ref 1–1.03)
UROBILINOGEN UR STRIP-ACNC: 2 EU/DL (ref 0–1)
WBC #/AREA URNS HPF: ABNORMAL /HPF
WBC OTHER # BLD: 5.7 K/UL (ref 4.5–11.5)

## 2025-05-12 PROCEDURE — 81001 URINALYSIS AUTO W/SCOPE: CPT

## 2025-05-12 PROCEDURE — 6360000002 HC RX W HCPCS: Performed by: INTERNAL MEDICINE

## 2025-05-12 PROCEDURE — 2500000003 HC RX 250 WO HCPCS: Performed by: INTERNAL MEDICINE

## 2025-05-12 PROCEDURE — 87040 BLOOD CULTURE FOR BACTERIA: CPT

## 2025-05-12 PROCEDURE — 99285 EMERGENCY DEPT VISIT HI MDM: CPT

## 2025-05-12 PROCEDURE — 6370000000 HC RX 637 (ALT 250 FOR IP): Performed by: EMERGENCY MEDICINE

## 2025-05-12 PROCEDURE — 84145 PROCALCITONIN (PCT): CPT

## 2025-05-12 PROCEDURE — 83605 ASSAY OF LACTIC ACID: CPT

## 2025-05-12 PROCEDURE — 82962 GLUCOSE BLOOD TEST: CPT

## 2025-05-12 PROCEDURE — 6370000000 HC RX 637 (ALT 250 FOR IP): Performed by: INTERNAL MEDICINE

## 2025-05-12 PROCEDURE — G0378 HOSPITAL OBSERVATION PER HR: HCPCS

## 2025-05-12 PROCEDURE — 85025 COMPLETE CBC W/AUTO DIFF WBC: CPT

## 2025-05-12 PROCEDURE — 80053 COMPREHEN METABOLIC PANEL: CPT

## 2025-05-12 PROCEDURE — 6360000002 HC RX W HCPCS

## 2025-05-12 PROCEDURE — 2580000003 HC RX 258: Performed by: INTERNAL MEDICINE

## 2025-05-12 PROCEDURE — 99223 1ST HOSP IP/OBS HIGH 75: CPT | Performed by: INTERNAL MEDICINE

## 2025-05-12 PROCEDURE — 87086 URINE CULTURE/COLONY COUNT: CPT

## 2025-05-12 PROCEDURE — 96365 THER/PROPH/DIAG IV INF INIT: CPT

## 2025-05-12 PROCEDURE — 2580000003 HC RX 258

## 2025-05-12 RX ORDER — CALCIUM CARBONATE 500 MG/1
500 TABLET, CHEWABLE ORAL 3 TIMES DAILY PRN
Status: DISCONTINUED | OUTPATIENT
Start: 2025-05-12 | End: 2025-05-23 | Stop reason: HOSPADM

## 2025-05-12 RX ORDER — DEXTROSE MONOHYDRATE 100 MG/ML
INJECTION, SOLUTION INTRAVENOUS CONTINUOUS PRN
Status: DISCONTINUED | OUTPATIENT
Start: 2025-05-12 | End: 2025-05-23 | Stop reason: HOSPADM

## 2025-05-12 RX ORDER — GLUCAGON 1 MG/ML
1 KIT INJECTION PRN
Status: DISCONTINUED | OUTPATIENT
Start: 2025-05-12 | End: 2025-05-23 | Stop reason: HOSPADM

## 2025-05-12 RX ORDER — MAGNESIUM SULFATE IN WATER 40 MG/ML
2000 INJECTION, SOLUTION INTRAVENOUS PRN
Status: DISCONTINUED | OUTPATIENT
Start: 2025-05-12 | End: 2025-05-23 | Stop reason: HOSPADM

## 2025-05-12 RX ORDER — BENZONATATE 100 MG/1
100 CAPSULE ORAL 3 TIMES DAILY PRN
Status: DISCONTINUED | OUTPATIENT
Start: 2025-05-12 | End: 2025-05-23 | Stop reason: HOSPADM

## 2025-05-12 RX ORDER — FAMOTIDINE 20 MG/1
40 TABLET, FILM COATED ORAL DAILY
Status: DISCONTINUED | OUTPATIENT
Start: 2025-05-13 | End: 2025-05-23 | Stop reason: HOSPADM

## 2025-05-12 RX ORDER — INSULIN GLARGINE 100 [IU]/ML
5 INJECTION, SOLUTION SUBCUTANEOUS NIGHTLY
Status: DISCONTINUED | OUTPATIENT
Start: 2025-05-12 | End: 2025-05-23 | Stop reason: HOSPADM

## 2025-05-12 RX ORDER — INSULIN LISPRO 100 [IU]/ML
0-6 INJECTION, SOLUTION INTRAVENOUS; SUBCUTANEOUS
Status: DISCONTINUED | OUTPATIENT
Start: 2025-05-12 | End: 2025-05-23 | Stop reason: HOSPADM

## 2025-05-12 RX ORDER — PANTOPRAZOLE SODIUM 40 MG/1
40 TABLET, DELAYED RELEASE ORAL DAILY
Status: DISCONTINUED | OUTPATIENT
Start: 2025-05-13 | End: 2025-05-23 | Stop reason: HOSPADM

## 2025-05-12 RX ORDER — FENTANYL CITRATE 50 UG/ML
25 INJECTION, SOLUTION INTRAMUSCULAR; INTRAVENOUS
Status: DISCONTINUED | OUTPATIENT
Start: 2025-05-12 | End: 2025-05-22 | Stop reason: ALTCHOICE

## 2025-05-12 RX ORDER — FOLIC ACID 1 MG/1
1000 TABLET ORAL DAILY
Status: DISCONTINUED | OUTPATIENT
Start: 2025-05-13 | End: 2025-05-23 | Stop reason: HOSPADM

## 2025-05-12 RX ORDER — ENOXAPARIN SODIUM 100 MG/ML
40 INJECTION SUBCUTANEOUS DAILY
Status: DISCONTINUED | OUTPATIENT
Start: 2025-05-13 | End: 2025-05-20

## 2025-05-12 RX ORDER — ONDANSETRON 4 MG/1
4 TABLET, ORALLY DISINTEGRATING ORAL EVERY 8 HOURS PRN
Status: DISCONTINUED | OUTPATIENT
Start: 2025-05-12 | End: 2025-05-15 | Stop reason: HOSPADM

## 2025-05-12 RX ORDER — ACETAMINOPHEN 325 MG/1
650 TABLET ORAL EVERY 6 HOURS PRN
Status: DISCONTINUED | OUTPATIENT
Start: 2025-05-12 | End: 2025-05-23 | Stop reason: HOSPADM

## 2025-05-12 RX ORDER — OXYCODONE AND ACETAMINOPHEN 5; 325 MG/1; MG/1
2 TABLET ORAL ONCE
Refills: 0 | Status: COMPLETED | OUTPATIENT
Start: 2025-05-12 | End: 2025-05-12

## 2025-05-12 RX ORDER — DOCUSATE SODIUM 100 MG/1
100 CAPSULE, LIQUID FILLED ORAL 2 TIMES DAILY PRN
Status: DISCONTINUED | OUTPATIENT
Start: 2025-05-12 | End: 2025-05-23 | Stop reason: HOSPADM

## 2025-05-12 RX ORDER — HYDRALAZINE HYDROCHLORIDE 20 MG/ML
10 INJECTION INTRAMUSCULAR; INTRAVENOUS EVERY 6 HOURS PRN
Status: DISCONTINUED | OUTPATIENT
Start: 2025-05-12 | End: 2025-05-23 | Stop reason: HOSPADM

## 2025-05-12 RX ORDER — ONDANSETRON 2 MG/ML
4 INJECTION INTRAMUSCULAR; INTRAVENOUS EVERY 6 HOURS PRN
Status: DISCONTINUED | OUTPATIENT
Start: 2025-05-12 | End: 2025-05-15 | Stop reason: HOSPADM

## 2025-05-12 RX ORDER — GABAPENTIN 300 MG/1
300 CAPSULE ORAL 2 TIMES DAILY
Status: DISCONTINUED | OUTPATIENT
Start: 2025-05-12 | End: 2025-05-20

## 2025-05-12 RX ORDER — SODIUM CHLORIDE 0.9 % (FLUSH) 0.9 %
5-40 SYRINGE (ML) INJECTION PRN
Status: DISCONTINUED | OUTPATIENT
Start: 2025-05-12 | End: 2025-05-23 | Stop reason: HOSPADM

## 2025-05-12 RX ORDER — TAMSULOSIN HYDROCHLORIDE 0.4 MG/1
0.4 CAPSULE ORAL NIGHTLY
Status: DISCONTINUED | OUTPATIENT
Start: 2025-05-12 | End: 2025-05-23 | Stop reason: HOSPADM

## 2025-05-12 RX ORDER — SODIUM CHLORIDE 9 MG/ML
INJECTION, SOLUTION INTRAVENOUS PRN
Status: DISCONTINUED | OUTPATIENT
Start: 2025-05-12 | End: 2025-05-23 | Stop reason: HOSPADM

## 2025-05-12 RX ORDER — POTASSIUM CHLORIDE 1500 MG/1
40 TABLET, EXTENDED RELEASE ORAL PRN
Status: DISCONTINUED | OUTPATIENT
Start: 2025-05-12 | End: 2025-05-23 | Stop reason: HOSPADM

## 2025-05-12 RX ORDER — OXYCODONE AND ACETAMINOPHEN 5; 325 MG/1; MG/1
1 TABLET ORAL EVERY 4 HOURS PRN
Refills: 0 | Status: DISCONTINUED | OUTPATIENT
Start: 2025-05-12 | End: 2025-05-20

## 2025-05-12 RX ORDER — METOPROLOL SUCCINATE 25 MG/1
12.5 TABLET, EXTENDED RELEASE ORAL DAILY
Status: DISCONTINUED | OUTPATIENT
Start: 2025-05-13 | End: 2025-05-23 | Stop reason: HOSPADM

## 2025-05-12 RX ORDER — POLYETHYLENE GLYCOL 3350 17 G/17G
17 POWDER, FOR SOLUTION ORAL DAILY PRN
Status: DISCONTINUED | OUTPATIENT
Start: 2025-05-12 | End: 2025-05-23 | Stop reason: HOSPADM

## 2025-05-12 RX ORDER — ASCORBIC ACID 500 MG
500 TABLET ORAL 2 TIMES DAILY
Status: DISCONTINUED | OUTPATIENT
Start: 2025-05-12 | End: 2025-05-23 | Stop reason: HOSPADM

## 2025-05-12 RX ORDER — SODIUM CHLORIDE 0.9 % (FLUSH) 0.9 %
5-40 SYRINGE (ML) INJECTION EVERY 12 HOURS SCHEDULED
Status: DISCONTINUED | OUTPATIENT
Start: 2025-05-12 | End: 2025-05-23 | Stop reason: HOSPADM

## 2025-05-12 RX ORDER — CLOPIDOGREL BISULFATE 75 MG/1
75 TABLET ORAL DAILY
Status: DISCONTINUED | OUTPATIENT
Start: 2025-05-13 | End: 2025-05-23 | Stop reason: HOSPADM

## 2025-05-12 RX ORDER — FERROUS SULFATE 325(65) MG
325 TABLET ORAL
Status: DISCONTINUED | OUTPATIENT
Start: 2025-05-13 | End: 2025-05-23 | Stop reason: HOSPADM

## 2025-05-12 RX ORDER — POTASSIUM CHLORIDE 7.45 MG/ML
10 INJECTION INTRAVENOUS PRN
Status: DISCONTINUED | OUTPATIENT
Start: 2025-05-12 | End: 2025-05-23 | Stop reason: HOSPADM

## 2025-05-12 RX ORDER — ATORVASTATIN CALCIUM 40 MG/1
40 TABLET, FILM COATED ORAL NIGHTLY
Status: DISCONTINUED | OUTPATIENT
Start: 2025-05-12 | End: 2025-05-23 | Stop reason: HOSPADM

## 2025-05-12 RX ORDER — ASPIRIN 81 MG/1
81 TABLET ORAL DAILY
Status: DISCONTINUED | OUTPATIENT
Start: 2025-05-13 | End: 2025-05-23 | Stop reason: HOSPADM

## 2025-05-12 RX ORDER — ACETAMINOPHEN 650 MG/1
650 SUPPOSITORY RECTAL EVERY 6 HOURS PRN
Status: DISCONTINUED | OUTPATIENT
Start: 2025-05-12 | End: 2025-05-23 | Stop reason: HOSPADM

## 2025-05-12 RX ADMIN — OXYCODONE AND ACETAMINOPHEN 2 TABLET: 5; 325 TABLET ORAL at 12:30

## 2025-05-12 RX ADMIN — ATORVASTATIN CALCIUM 40 MG: 40 TABLET, FILM COATED ORAL at 21:39

## 2025-05-12 RX ADMIN — OXYCODONE AND ACETAMINOPHEN 1 TABLET: 5; 325 TABLET ORAL at 23:49

## 2025-05-12 RX ADMIN — INSULIN LISPRO 2 UNITS: 100 INJECTION, SOLUTION INTRAVENOUS; SUBCUTANEOUS at 17:06

## 2025-05-12 RX ADMIN — SODIUM CHLORIDE, PRESERVATIVE FREE 10 ML: 5 INJECTION INTRAVENOUS at 21:39

## 2025-05-12 RX ADMIN — GABAPENTIN 300 MG: 300 CAPSULE ORAL at 21:39

## 2025-05-12 RX ADMIN — Medication 500 MG: at 21:39

## 2025-05-12 RX ADMIN — CEFEPIME 2000 MG: 2 INJECTION, POWDER, FOR SOLUTION INTRAVENOUS at 12:17

## 2025-05-12 RX ADMIN — CEFEPIME 2000 MG: 2 INJECTION, POWDER, FOR SOLUTION INTRAVENOUS at 21:37

## 2025-05-12 RX ADMIN — INSULIN LISPRO 1 UNITS: 100 INJECTION, SOLUTION INTRAVENOUS; SUBCUTANEOUS at 22:38

## 2025-05-12 RX ADMIN — TAMSULOSIN HYDROCHLORIDE 0.4 MG: 0.4 CAPSULE ORAL at 21:39

## 2025-05-12 RX ADMIN — OXYCODONE AND ACETAMINOPHEN 1 TABLET: 5; 325 TABLET ORAL at 16:59

## 2025-05-12 RX ADMIN — INSULIN GLARGINE 5 UNITS: 100 INJECTION, SOLUTION SUBCUTANEOUS at 22:37

## 2025-05-12 ASSESSMENT — LIFESTYLE VARIABLES
HOW OFTEN DO YOU HAVE A DRINK CONTAINING ALCOHOL: NEVER
HOW MANY STANDARD DRINKS CONTAINING ALCOHOL DO YOU HAVE ON A TYPICAL DAY: PATIENT DOES NOT DRINK

## 2025-05-12 ASSESSMENT — PAIN DESCRIPTION - DESCRIPTORS: DESCRIPTORS: ACHING;CRAMPING;CRUSHING;DISCOMFORT

## 2025-05-12 ASSESSMENT — PAIN SCALES - GENERAL
PAINLEVEL_OUTOF10: 10
PAINLEVEL_OUTOF10: 8

## 2025-05-12 ASSESSMENT — PAIN - FUNCTIONAL ASSESSMENT: PAIN_FUNCTIONAL_ASSESSMENT: NONE - DENIES PAIN

## 2025-05-12 ASSESSMENT — PAIN DESCRIPTION - LOCATION: LOCATION: FOOT;BACK

## 2025-05-12 ASSESSMENT — PAIN DESCRIPTION - ORIENTATION: ORIENTATION: LOWER

## 2025-05-12 NOTE — ED PROVIDER NOTES
(HUMALOG,ADMELOG) injection vial 0-6 Units (has no administration in time range)   metoprolol succinate (TOPROL XL) extended release tablet 12.5 mg (has no administration in time range)   pantoprazole (PROTONIX) tablet 40 mg (has no administration in time range)   tamsulosin (FLOMAX) capsule 0.4 mg (has no administration in time range)   glucose chewable tablet 16 g (has no administration in time range)   dextrose bolus 10% 125 mL (has no administration in time range)     Or   dextrose bolus 10% 250 mL (has no administration in time range)   glucagon injection 1 mg (has no administration in time range)   dextrose 10 % infusion (has no administration in time range)   ceFEPIme (MAXIPIME) 2,000 mg in sodium chloride 0.9 % 100 mL IVPB ([id number]) (has no administration in time range)   ceFEPIme (MAXIPIME) 2,000 mg in sodium chloride 0.9 % 100 mL IVPB ([id number]) (0 mg IntraVENous Stopped 5/12/25 1347)   oxyCODONE-acetaminophen (PERCOCET) 5-325 MG per tablet 2 tablet (2 tablets Oral Given 5/12/25 1230)    (medications given in the ED and medications ordered by admitting physician at time of submitting note)      CONSULTS: (Who and What was discussed)  IP CONSULT TO FAMILY MEDICINE  IP CONSULT TO INFECTIOUS DISEASES      Social Determinants : None      I am the Primary Clinician of Record.    FINAL IMPRESSION      1. Bacteremia          DISPOSITION/PLAN     DISPOSITION Admitted 05/12/2025 01:58:15 PM               PATIENT REFERRED TO:  No follow-up provider specified.    DISCHARGE MEDICATIONS:  New Prescriptions    No medications on file       DISCONTINUED MEDICATIONS:  Discontinued Medications    No medications on file              (Please note that portions of this note were completed with a voice recognition program.  Efforts were made to edit the dictations but occasionally words are mis-transcribed.)    Pernell Lockwood,  PGY-2

## 2025-05-12 NOTE — H&P
daily 4/7/25   Genet Kraus, DO   tamsulosin (FLOMAX) 0.4 MG capsule Take 1 capsule by mouth nightly 4/7/25   Genet Kraus, DO   gabapentin (NEURONTIN) 300 MG capsule Take 1 capsule by mouth 2 times daily for 180 days. Intended supply: 90 days 4/7/25 10/4/25  Robin Krausa A, DO   carboxymethylcellulose (ARTIFICIAL TEARS) 1 % ophthalmic solution 2 drops every 8 hours as needed for Dry Eyes    Provider, MD Martine   pantoprazole (PROTONIX) 40 MG tablet Take 1 tablet by mouth daily 11/25/24   Genet Kraus DO   vitamin D (ERGOCALCIFEROL) 1.25 MG (94304 UT) CAPS capsule Take 1 capsule by mouth Once a week at 5 PM 10/28/24   Genet Kraus DO   ferrous sulfate (IRON 325) 325 (65 Fe) MG tablet Take 1 tablet by mouth daily (with breakfast) 7/22/24   Delmis Mckenna, APRN - CNP   folic acid (FOLVITE) 1 MG tablet Take 1 tablet by mouth daily 7/17/24   Delmis Mckenna APRN - CNP       Allergies  Pcn [penicillins]    Social Hx  Social History     Socioeconomic History    Marital status: Single     Spouse name: Not on file    Number of children: Not on file    Years of education: Not on file    Highest education level: Not on file   Occupational History    Not on file   Tobacco Use    Smoking status: Former     Current packs/day: 0.00     Average packs/day: 1 pack/day for 25.0 years (25.0 ttl pk-yrs)     Types: Cigarettes     Start date: 1/1/1990     Quit date: 1/1/2015     Years since quitting: 10.3     Passive exposure: Past    Smokeless tobacco: Never   Vaping Use    Vaping status: Never Used   Substance and Sexual Activity    Alcohol use: Not Currently     Comment: Former every day drinker for most of adult life    Drug use: No    Sexual activity: Not on file   Other Topics Concern    Not on file   Social History Narrative    Not on file     Social Drivers of Health     Financial Resource Strain: Low Risk  (9/30/2024)    Overall Financial Resource Strain (CARDIA)     Difficulty of Paying

## 2025-05-12 NOTE — PLAN OF CARE
Patient was sent from nursing home for positive blood cultures.  Blood cultures drawn on 05/09 grew strep agalactiae.  Urine culture from 05/09 grew Enterobacter cloacae.  Recent CABG 04/17.  Continue cefepime.  Repeat blood cultures.  Full consult will follow.

## 2025-05-12 NOTE — ED NOTES
ED TO INPATIENT SBAR HANDOFF    Patient Name: Jerome Steel   Preferred Name: Jerome  : 1960  64 y.o.   Code Status Order: Full Code  Telemetry Order: Yes  C-SSRS: Risk of Suicide: No Risk  Sitter no     Restraints:       Situation  Chief Complaint   Patient presents with    Blood Infection     Sent in from [facility] for positive blood cultures. Seen in ED recently for UTI. Has existing taylor catheter in. Already on IV antibiotics at rehab for endocarditis. PICC line present at triage.      Brief Description of Patient's Condition: stable   Mental Status: oriented and alert    Background  Allergies:   Allergies   Allergen Reactions    Pcn [Penicillins] Anaphylaxis       Assessment  Vitals/MEWS: MEWS Score: 1  Level of Consciousness: Alert (0)   Vitals:    25 1026   BP: 104/62   Pulse: 76   Resp: 19   Temp: 98.7 °F (37.1 °C)   TempSrc: Oral   SpO2: 95%   Weight: 75.3 kg (166 lb)   Height: 1.803 m (5' 11\")     Cardiac Rhythm:    Deterioration Index (DI): Deterioration Index: 21.99  Deterioration Index (DI) Interventions Performed:    O2 Flow Rate:    O2 Device: O2 Device: None (Room air)    Active Central Lines:                          Active Wounds:    Active Taylor's:      Recommendation  Patient Belongings:    Additional Comments:   If any further questions, please call Sending RN at [phone]  Opportunity for questions and clarification were provided to (name of person notified and time):        Electronically signed by: Electronically signed by SANTI MIRAMONTES RN on 2025 at 6:28 PM

## 2025-05-13 ENCOUNTER — APPOINTMENT (OUTPATIENT)
Dept: GENERAL RADIOLOGY | Age: 65
DRG: 616 | End: 2025-05-13
Payer: COMMERCIAL

## 2025-05-13 LAB
ALBUMIN SERPL-MCNC: 2.9 G/DL (ref 3.5–5.2)
ALP SERPL-CCNC: 211 U/L (ref 40–129)
ALT SERPL-CCNC: 23 U/L (ref 0–50)
ANION GAP SERPL CALCULATED.3IONS-SCNC: 11 MMOL/L (ref 7–16)
AST SERPL-CCNC: 30 U/L (ref 0–50)
BILIRUB SERPL-MCNC: 0.4 MG/DL (ref 0–1.2)
BUN SERPL-MCNC: 20 MG/DL (ref 8–23)
CALCIUM SERPL-MCNC: 8.6 MG/DL (ref 8.8–10.2)
CHLORIDE SERPL-SCNC: 105 MMOL/L (ref 98–107)
CHOLEST SERPL-MCNC: 87 MG/DL
CO2 SERPL-SCNC: 21 MMOL/L (ref 22–29)
CREAT SERPL-MCNC: 0.7 MG/DL (ref 0.7–1.2)
CRP SERPL HS-MCNC: 63.8 MG/L (ref 0–5)
ERYTHROCYTE [DISTWIDTH] IN BLOOD BY AUTOMATED COUNT: 15.8 % (ref 11.5–15)
ERYTHROCYTE [SEDIMENTATION RATE] IN BLOOD BY WESTERGREN METHOD: 70 MM/HR (ref 0–15)
GFR, ESTIMATED: >90 ML/MIN/1.73M2
GLUCOSE BLD-MCNC: 134 MG/DL (ref 74–99)
GLUCOSE BLD-MCNC: 137 MG/DL (ref 74–99)
GLUCOSE BLD-MCNC: 171 MG/DL (ref 74–99)
GLUCOSE BLD-MCNC: 213 MG/DL (ref 74–99)
GLUCOSE SERPL-MCNC: 120 MG/DL (ref 74–99)
HBA1C MFR BLD: 6.2 % (ref 4–5.6)
HCT VFR BLD AUTO: 24.8 % (ref 37–54)
HDLC SERPL-MCNC: 22 MG/DL
HGB BLD-MCNC: 7.8 G/DL (ref 12.5–16.5)
LDLC SERPL CALC-MCNC: 41 MG/DL
MAGNESIUM SERPL-MCNC: 1.8 MG/DL (ref 1.6–2.4)
MCH RBC QN AUTO: 27.6 PG (ref 26–35)
MCHC RBC AUTO-ENTMCNC: 31.5 G/DL (ref 32–34.5)
MCV RBC AUTO: 87.6 FL (ref 80–99.9)
MICROORGANISM SPEC CULT: NO GROWTH
PHOSPHATE SERPL-MCNC: 3.9 MG/DL (ref 2.5–4.5)
PLATELET # BLD AUTO: 243 K/UL (ref 130–450)
PMV BLD AUTO: 9.5 FL (ref 7–12)
POTASSIUM SERPL-SCNC: 3.7 MMOL/L (ref 3.5–5.1)
PROCALCITONIN SERPL-MCNC: 0.16 NG/ML (ref 0–0.08)
PROT SERPL-MCNC: 6 G/DL (ref 6.4–8.3)
RBC # BLD AUTO: 2.83 M/UL (ref 3.8–5.8)
SERVICE CMNT-IMP: NORMAL
SODIUM SERPL-SCNC: 137 MMOL/L (ref 136–145)
SPECIMEN DESCRIPTION: NORMAL
TRIGL SERPL-MCNC: 122 MG/DL
TSH SERPL DL<=0.05 MIU/L-ACNC: 0.71 UIU/ML (ref 0.27–4.2)
VLDLC SERPL CALC-MCNC: 24 MG/DL
WBC OTHER # BLD: 6.9 K/UL (ref 4.5–11.5)

## 2025-05-13 PROCEDURE — 84443 ASSAY THYROID STIM HORMONE: CPT

## 2025-05-13 PROCEDURE — G0378 HOSPITAL OBSERVATION PER HR: HCPCS

## 2025-05-13 PROCEDURE — 73630 X-RAY EXAM OF FOOT: CPT

## 2025-05-13 PROCEDURE — 83735 ASSAY OF MAGNESIUM: CPT

## 2025-05-13 PROCEDURE — 80053 COMPREHEN METABOLIC PANEL: CPT

## 2025-05-13 PROCEDURE — 84145 PROCALCITONIN (PCT): CPT

## 2025-05-13 PROCEDURE — 51702 INSERT TEMP BLADDER CATH: CPT

## 2025-05-13 PROCEDURE — 2580000003 HC RX 258: Performed by: INTERNAL MEDICINE

## 2025-05-13 PROCEDURE — 6370000000 HC RX 637 (ALT 250 FOR IP): Performed by: INTERNAL MEDICINE

## 2025-05-13 PROCEDURE — 85027 COMPLETE CBC AUTOMATED: CPT

## 2025-05-13 PROCEDURE — 84100 ASSAY OF PHOSPHORUS: CPT

## 2025-05-13 PROCEDURE — 6360000002 HC RX W HCPCS: Performed by: INTERNAL MEDICINE

## 2025-05-13 PROCEDURE — 82962 GLUCOSE BLOOD TEST: CPT

## 2025-05-13 PROCEDURE — 99232 SBSQ HOSP IP/OBS MODERATE 35: CPT | Performed by: INTERNAL MEDICINE

## 2025-05-13 PROCEDURE — 2500000003 HC RX 250 WO HCPCS: Performed by: INTERNAL MEDICINE

## 2025-05-13 PROCEDURE — 83036 HEMOGLOBIN GLYCOSYLATED A1C: CPT

## 2025-05-13 PROCEDURE — 80061 LIPID PANEL: CPT

## 2025-05-13 PROCEDURE — 86140 C-REACTIVE PROTEIN: CPT

## 2025-05-13 PROCEDURE — 85652 RBC SED RATE AUTOMATED: CPT

## 2025-05-13 RX ORDER — TETRAHYDROZOLINE HCL 0.05 %
1 DROPS OPHTHALMIC (EYE) EVERY 4 HOURS PRN
Status: DISCONTINUED | OUTPATIENT
Start: 2025-05-13 | End: 2025-05-23 | Stop reason: HOSPADM

## 2025-05-13 RX ADMIN — OXYCODONE AND ACETAMINOPHEN 1 TABLET: 5; 325 TABLET ORAL at 20:05

## 2025-05-13 RX ADMIN — SODIUM CHLORIDE, PRESERVATIVE FREE 10 ML: 5 INJECTION INTRAVENOUS at 09:56

## 2025-05-13 RX ADMIN — GABAPENTIN 300 MG: 300 CAPSULE ORAL at 20:05

## 2025-05-13 RX ADMIN — METOPROLOL SUCCINATE 12.5 MG: 25 TABLET, FILM COATED, EXTENDED RELEASE ORAL at 09:56

## 2025-05-13 RX ADMIN — PANTOPRAZOLE SODIUM 40 MG: 40 TABLET, DELAYED RELEASE ORAL at 09:55

## 2025-05-13 RX ADMIN — ATORVASTATIN CALCIUM 40 MG: 40 TABLET, FILM COATED ORAL at 20:06

## 2025-05-13 RX ADMIN — CEFEPIME 2000 MG: 2 INJECTION, POWDER, FOR SOLUTION INTRAVENOUS at 13:02

## 2025-05-13 RX ADMIN — OXYCODONE AND ACETAMINOPHEN 1 TABLET: 5; 325 TABLET ORAL at 15:26

## 2025-05-13 RX ADMIN — FERROUS SULFATE TAB 325 MG (65 MG ELEMENTAL FE) 325 MG: 325 (65 FE) TAB at 10:00

## 2025-05-13 RX ADMIN — ENOXAPARIN SODIUM 40 MG: 100 INJECTION SUBCUTANEOUS at 09:55

## 2025-05-13 RX ADMIN — CLOPIDOGREL BISULFATE 75 MG: 75 TABLET, FILM COATED ORAL at 10:00

## 2025-05-13 RX ADMIN — OXYCODONE AND ACETAMINOPHEN 1 TABLET: 5; 325 TABLET ORAL at 05:42

## 2025-05-13 RX ADMIN — FAMOTIDINE 40 MG: 20 TABLET, FILM COATED ORAL at 10:00

## 2025-05-13 RX ADMIN — FOLIC ACID 1000 MCG: 1 TABLET ORAL at 10:00

## 2025-05-13 RX ADMIN — CEFEPIME 2000 MG: 2 INJECTION, POWDER, FOR SOLUTION INTRAVENOUS at 20:10

## 2025-05-13 RX ADMIN — Medication 500 MG: at 10:00

## 2025-05-13 RX ADMIN — SODIUM CHLORIDE, PRESERVATIVE FREE 10 ML: 5 INJECTION INTRAVENOUS at 20:06

## 2025-05-13 RX ADMIN — GABAPENTIN 300 MG: 300 CAPSULE ORAL at 09:57

## 2025-05-13 RX ADMIN — INSULIN GLARGINE 5 UNITS: 100 INJECTION, SOLUTION SUBCUTANEOUS at 20:44

## 2025-05-13 RX ADMIN — OXYCODONE AND ACETAMINOPHEN 1 TABLET: 5; 325 TABLET ORAL at 09:55

## 2025-05-13 RX ADMIN — INSULIN LISPRO 2 UNITS: 100 INJECTION, SOLUTION INTRAVENOUS; SUBCUTANEOUS at 20:45

## 2025-05-13 RX ADMIN — Medication 500 MG: at 20:06

## 2025-05-13 RX ADMIN — ASPIRIN 81 MG: 81 TABLET, COATED ORAL at 10:00

## 2025-05-13 RX ADMIN — CEFEPIME 2000 MG: 2 INJECTION, POWDER, FOR SOLUTION INTRAVENOUS at 04:46

## 2025-05-13 RX ADMIN — TAMSULOSIN HYDROCHLORIDE 0.4 MG: 0.4 CAPSULE ORAL at 20:06

## 2025-05-13 ASSESSMENT — PAIN DESCRIPTION - ORIENTATION
ORIENTATION: LEFT
ORIENTATION: RIGHT
ORIENTATION: RIGHT

## 2025-05-13 ASSESSMENT — PAIN SCALES - GENERAL
PAINLEVEL_OUTOF10: 10
PAINLEVEL_OUTOF10: 8
PAINLEVEL_OUTOF10: 6
PAINLEVEL_OUTOF10: 6
PAINLEVEL_OUTOF10: 10
PAINLEVEL_OUTOF10: 8

## 2025-05-13 ASSESSMENT — PAIN DESCRIPTION - LOCATION
LOCATION: LEG;ARM;FOOT
LOCATION: FOOT;BACK
LOCATION: FOOT

## 2025-05-13 ASSESSMENT — PAIN DESCRIPTION - DESCRIPTORS
DESCRIPTORS: SORE;SHARP;DULL
DESCRIPTORS: ACHING;DISCOMFORT;SHOOTING;SHARP
DESCRIPTORS: ACHING;CRAMPING;CRUSHING;DISCOMFORT

## 2025-05-13 NOTE — CONSULTS
Vascular Surgery Consultation Note    Reason for Consult:  R heel wound, PVD    Previous Vascular Procedures  3/1/2021 L femoral, popliteal plasty - Nini   5/31/2022 L SFA, popliteal atherectomy, plasty  L PT plasty into the foot     HPI :    This is a 64 y.o. male who is admitted to the hospital for treatment of positive blood cultures from the nursing home. BC from 5/9 + strep agalactiae and pt was started on rocephin. The patient underwent CABG 4/17/25 by Dr. Sterling. The patient states sometime since surgery and during rehab stay he developed a right heel wound. Vascular surgery is consulted for evaluation and treatment of right heel wound, PVD.     The patient has a history of L heel wound that eventually healed after revascularization and several debridements and hyperbaric oxygen therapy. He denies any recurrence of wounds on the left heel. He does not wasn't the right heel wound to turn into what he endured on the left.     The patient has a history of stroke about 10 years ago that left him with left sided hemiparesis. He is still able to walk with a walker and had been ambulating at the rehab facility with physical therapy prior to this hospitalization.     ROS : Negative if blank [], Positive if [x]  General Vascular   [] Fevers [] Claudication (Blocks)   [] Chills [] Rest Pain   [] Weight Loss [x] Tissue Loss   [] Chest Pain [] Clotting Disorder    [] SOB at rest [] Leg Swelling   [] SOB with exertion [] DVT/PE      [] Nausea    [] Vomitting [x] Stroke/TIA   [] Abdominal Pain [] Focal weakness   [] Melena [] Slurred Speech   [] Hematochezia [] Vision Changes   [] Hematuria    [] Dysuria [] Hx of Central Catheters   [] Wears Glasses/Contacts  [] Dialysis and If so date initiated   [] Blindness        [] Difficulty swallowing        Past Medical History:   Diagnosis Date    Abscess of prostate     Anemia     Arthritis     Atherosclerotic heart disease of native coronary artery without angina pectoris

## 2025-05-13 NOTE — PLAN OF CARE
Problem: Chronic Conditions and Co-morbidities  Goal: Patient's chronic conditions and co-morbidity symptoms are monitored and maintained or improved  5/13/2025 1044 by Anh Tomlinson RN  Outcome: Progressing  5/13/2025 0022 by Natacha uJárez RN  Outcome: Progressing     Problem: Discharge Planning  Goal: Discharge to home or other facility with appropriate resources  5/13/2025 1044 by Anh Tomlinson RN  Outcome: Progressing  5/13/2025 0022 by Natacha Juárez RN  Outcome: Progressing     Problem: Safety - Adult  Goal: Free from fall injury  5/13/2025 1044 by Anh Tomlinson RN  Outcome: Progressing  5/13/2025 0022 by Natacha Juárez RN  Outcome: Progressing

## 2025-05-13 NOTE — CARE COORDINATION
Pt admitted from [facility] for bacteremia, pod, urology, wound care, and ID consulted. Pt on IV cefepime q8, has picline placed 5/12. Pt had recent CABG 4/17. Met with pt, pt's plan is to return to [facility]. Called and left message for [facility] to check bed hold status. Envelope started, await therapy for transport recommendations.Arteha Mireles, MSW, LSW

## 2025-05-13 NOTE — CONSULTS
Department of Podiatry   Consult Note        Reason for Consult:  Heel wounds    CHIEF COMPLAINT:  Bacteremia    HISTORY OF PRESENT ILLNESS:    Jerome Steel is a 64 y.o. male with significant past medical history of diabetes, CVA, COPD, HLD, HTN, PVD and TIA. He states he's had a wound before on the left heel and he doesn't want it to turn out the same way. He doesn't know how long he's had it. He has no other complaints of pain or discomfort at this time. Patient denies any N/V/D/F/C/SOB/CP and has no other pedal complaints at this time.     Past Medical History:        Diagnosis Date    Abscess of prostate     Anemia     Arthritis     Atherosclerotic heart disease of native coronary artery without angina pectoris     Bilateral carpal tunnel syndrome 2016    Cerebral artery occlusion with cerebral infarction ([facility])     Chronic back pain     COPD (chronic obstructive pulmonary disease) ([facility])     Coronary artery disease involving native coronary artery of native heart without angina pectoris 09/16/2022    CVA (cerebral vascular accident) ([facility]) 11/2015, 2017    Diabetes mellitus ([facility])     Type 2    Diabetic foot ulcer with osteomyelitis ([facility]) 12/21/2021    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 2 diabetes mellitus, with necrosis of bone ([facility]) 12/21/2021    Disease of multiple valves of heart     Dysphagia     EtOH dependence ([facility])     Falls     GERD (gastroesophageal reflux disease)     Hemiparesis affecting left side as late effect of cerebrovascular accident ([facility])     LEFT SIDE NON DOMINANT FOLLOWING STROKE    Hydrocele     Hyperlipidemia     Hypertension     Hypothyroid     Inguinal hernia     MI (myocardial infarction) ([facility])     Moderate aortic regurgitation     Multiple fractures of rib involving four or more ribs     left    Obstructive and reflux uropathy     Osteomyelitis, ankle and foot ([facility])     left    Paralytic gait     Peripheral vascular angioplasty status 12/21/2021    PFO (patent foramen

## 2025-05-13 NOTE — CONSULTS
[facility]  [facility]  Consultation Note     Admit Date: 5/12/2025 10:24 AM    Reason for Consult:    positive blood cultures, recent CABG, +chronic taylor, +foot wound    Attending Physician:  John Valdes MD    HISTORY OF PRESENT ILLNESS:             The history is obtained from extensive review of available past medical records. The patient is a 64 y.o. male who presents with positive blood cultures from nursing home.Blood cx strep agalactiae 5/9. Follow up blood cx neg thus far. He reports he had some chills. He has chronic right heel wound that had some drainage. Reports minimal foot pain. He had CABG last month. WBC elevated. He was started on ceftriaxone at facility and has a midline. He has chronic taylor as well due to retention. No abdominal pain.     Past Medical History:        Diagnosis Date    Abscess of prostate     Anemia     Arthritis     Atherosclerotic heart disease of native coronary artery without angina pectoris     Bilateral carpal tunnel syndrome 2016    Cerebral artery occlusion with cerebral infarction ([facility])     Chronic back pain     COPD (chronic obstructive pulmonary disease) ([facility])     Coronary artery disease involving native coronary artery of native heart without angina pectoris 09/16/2022    CVA (cerebral vascular accident) ([facility]) 11/2015, 2017    Diabetes mellitus ([facility])     Type 2    Diabetic foot ulcer with osteomyelitis ([facility]) 12/21/2021    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 2 diabetes mellitus, with necrosis of bone ([facility]) 12/21/2021    Disease of multiple valves of heart     Dysphagia     EtOH dependence ([facility])     Falls     GERD (gastroesophageal reflux disease)     Hemiparesis affecting left side as late effect of cerebrovascular accident ([facility])     LEFT SIDE NON DOMINANT FOLLOWING STROKE    Hydrocele     Hyperlipidemia     Hypertension     Hypothyroid     Inguinal hernia     MI (myocardial infarction) ([facility])     Moderate aortic

## 2025-05-13 NOTE — CONSULTS
[address].  [place], [place] [address]  [phone]    INPATIENT CONSULTATION RECORD FOR  5/13/2025      REASON FOR CONSULTATION:  BPH/Urinary retention/History of prostatitis/History of prostatic abscess/UTI      HISTORY OF PRESENT ILLNESS:      The patient is a 64 y.o. male patient who has been undergoing rehabilitation at [facility] rehab.  He was admitted due to recent positive blood cultures.  He is being seen by infectious disease.  He feels well and is sitting up in bed eating dinner.  He is known to our practice.  He has had chronic urinary retention and a Cole.  This was recently changed.  He denies any catheter discomfort or trauma.  He failed a voiding trial last month.  He is minimally ambulatory at this time.  He is taking Flomax without side effects.  CT scan on 2/27/2025 shows cirrhosis of the liver, increased colonic fecal retention, stable appearance of the prostate which is enlarged with multifocal cystic changes, and a right inguinal hernia.  Urine culture on 5/13/2022 was negative      Past Medical History:   Diagnosis Date    Abscess of prostate     Anemia     Arthritis     Atherosclerotic heart disease of native coronary artery without angina pectoris     Bilateral carpal tunnel syndrome 2016    Cerebral artery occlusion with cerebral infarction ([facility])     Chronic back pain     COPD (chronic obstructive pulmonary disease) ([facility])     Coronary artery disease involving native coronary artery of native heart without angina pectoris 09/16/2022    CVA (cerebral vascular accident) ([facility]) 11/2015, 2017    Diabetes mellitus ([facility])     Type 2    Diabetic foot ulcer with osteomyelitis ([facility]) 12/21/2021    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 2 diabetes mellitus, with necrosis of bone ([facility]) 12/21/2021    Disease of multiple valves of heart     Dysphagia     EtOH dependence ([facility])     Falls     GERD (gastroesophageal reflux disease)     Hemiparesis affecting left side as late effect of

## 2025-05-14 ENCOUNTER — ANESTHESIA (OUTPATIENT)
Dept: OPERATING ROOM | Age: 65
End: 2025-05-14
Payer: COMMERCIAL

## 2025-05-14 ENCOUNTER — ANESTHESIA EVENT (OUTPATIENT)
Dept: OPERATING ROOM | Age: 65
End: 2025-05-14
Payer: COMMERCIAL

## 2025-05-14 LAB
ABO + RH BLD: NORMAL
ANION GAP SERPL CALCULATED.3IONS-SCNC: 10 MMOL/L (ref 7–16)
ARM BAND NUMBER: NORMAL
BASOPHILS # BLD: 0.05 K/UL (ref 0–0.2)
BASOPHILS NFR BLD: 1 % (ref 0–2)
BLOOD BANK SAMPLE EXPIRATION: NORMAL
BLOOD GROUP ANTIBODIES SERPL: NEGATIVE
BUN SERPL-MCNC: 18 MG/DL (ref 8–23)
CALCIUM SERPL-MCNC: 8.6 MG/DL (ref 8.8–10.2)
CHLORIDE SERPL-SCNC: 105 MMOL/L (ref 98–107)
CO2 SERPL-SCNC: 21 MMOL/L (ref 22–29)
CREAT SERPL-MCNC: 0.7 MG/DL (ref 0.7–1.2)
EOSINOPHIL # BLD: 0.46 K/UL (ref 0.05–0.5)
EOSINOPHILS RELATIVE PERCENT: 6 % (ref 0–6)
ERYTHROCYTE [DISTWIDTH] IN BLOOD BY AUTOMATED COUNT: 15.7 % (ref 11.5–15)
GFR, ESTIMATED: >90 ML/MIN/1.73M2
GLUCOSE BLD-MCNC: 105 MG/DL (ref 74–99)
GLUCOSE BLD-MCNC: 106 MG/DL (ref 74–99)
GLUCOSE BLD-MCNC: 117 MG/DL (ref 74–99)
GLUCOSE BLD-MCNC: 154 MG/DL (ref 74–99)
GLUCOSE SERPL-MCNC: 115 MG/DL (ref 74–99)
HCT VFR BLD AUTO: 24.4 % (ref 37–54)
HGB BLD-MCNC: 7.8 G/DL (ref 12.5–16.5)
IMM GRANULOCYTES # BLD AUTO: 0.07 K/UL (ref 0–0.58)
IMM GRANULOCYTES NFR BLD: 1 % (ref 0–5)
LYMPHOCYTES NFR BLD: 1.12 K/UL (ref 1.5–4)
LYMPHOCYTES RELATIVE PERCENT: 14 % (ref 20–42)
MCH RBC QN AUTO: 28 PG (ref 26–35)
MCHC RBC AUTO-ENTMCNC: 32 G/DL (ref 32–34.5)
MCV RBC AUTO: 87.5 FL (ref 80–99.9)
MONOCYTES NFR BLD: 0.61 K/UL (ref 0.1–0.95)
MONOCYTES NFR BLD: 8 % (ref 2–12)
NEUTROPHILS NFR BLD: 71 % (ref 43–80)
NEUTS SEG NFR BLD: 5.75 K/UL (ref 1.8–7.3)
PLATELET # BLD AUTO: 262 K/UL (ref 130–450)
PMV BLD AUTO: 9.5 FL (ref 7–12)
POTASSIUM SERPL-SCNC: 3.8 MMOL/L (ref 3.5–5.1)
RBC # BLD AUTO: 2.79 M/UL (ref 3.8–5.8)
SODIUM SERPL-SCNC: 136 MMOL/L (ref 136–145)
WBC OTHER # BLD: 8.1 K/UL (ref 4.5–11.5)

## 2025-05-14 PROCEDURE — 6360000002 HC RX W HCPCS: Performed by: INTERNAL MEDICINE

## 2025-05-14 PROCEDURE — 86901 BLOOD TYPING SEROLOGIC RH(D): CPT

## 2025-05-14 PROCEDURE — 2709999900 HC NON-CHARGEABLE SUPPLY: Performed by: PODIATRIST

## 2025-05-14 PROCEDURE — 87116 MYCOBACTERIA CULTURE: CPT

## 2025-05-14 PROCEDURE — 82962 GLUCOSE BLOOD TEST: CPT

## 2025-05-14 PROCEDURE — 87077 CULTURE AEROBIC IDENTIFY: CPT

## 2025-05-14 PROCEDURE — 87102 FUNGUS ISOLATION CULTURE: CPT

## 2025-05-14 PROCEDURE — 87015 SPECIMEN INFECT AGNT CONCNTJ: CPT

## 2025-05-14 PROCEDURE — 6360000002 HC RX W HCPCS

## 2025-05-14 PROCEDURE — 3600000014 HC SURGERY LEVEL 4 ADDTL 15MIN: Performed by: PODIATRIST

## 2025-05-14 PROCEDURE — 86900 BLOOD TYPING SEROLOGIC ABO: CPT

## 2025-05-14 PROCEDURE — 2580000003 HC RX 258: Performed by: INTERNAL MEDICINE

## 2025-05-14 PROCEDURE — 88305 TISSUE EXAM BY PATHOLOGIST: CPT

## 2025-05-14 PROCEDURE — 86403 PARTICLE AGGLUT ANTBDY SCRN: CPT

## 2025-05-14 PROCEDURE — 3700000001 HC ADD 15 MINUTES (ANESTHESIA): Performed by: PODIATRIST

## 2025-05-14 PROCEDURE — 88311 DECALCIFY TISSUE: CPT

## 2025-05-14 PROCEDURE — 3600000004 HC SURGERY LEVEL 4 BASE: Performed by: PODIATRIST

## 2025-05-14 PROCEDURE — 87205 SMEAR GRAM STAIN: CPT

## 2025-05-14 PROCEDURE — 2500000003 HC RX 250 WO HCPCS

## 2025-05-14 PROCEDURE — 80048 BASIC METABOLIC PNL TOTAL CA: CPT

## 2025-05-14 PROCEDURE — 2580000003 HC RX 258

## 2025-05-14 PROCEDURE — 0KBV0ZZ EXCISION OF RIGHT FOOT MUSCLE, OPEN APPROACH: ICD-10-PCS | Performed by: PODIATRIST

## 2025-05-14 PROCEDURE — 87176 TISSUE HOMOGENIZATION CULTR: CPT

## 2025-05-14 PROCEDURE — G0378 HOSPITAL OBSERVATION PER HR: HCPCS

## 2025-05-14 PROCEDURE — 87206 SMEAR FLUORESCENT/ACID STAI: CPT

## 2025-05-14 PROCEDURE — 36415 COLL VENOUS BLD VENIPUNCTURE: CPT

## 2025-05-14 PROCEDURE — 86850 RBC ANTIBODY SCREEN: CPT

## 2025-05-14 PROCEDURE — 87181 SC STD AGAR DILUTION PER AGT: CPT

## 2025-05-14 PROCEDURE — 87075 CULTR BACTERIA EXCEPT BLOOD: CPT

## 2025-05-14 PROCEDURE — 87106 FUNGI IDENTIFICATION YEAST: CPT

## 2025-05-14 PROCEDURE — 7100000000 HC PACU RECOVERY - FIRST 15 MIN: Performed by: PODIATRIST

## 2025-05-14 PROCEDURE — 99232 SBSQ HOSP IP/OBS MODERATE 35: CPT | Performed by: INTERNAL MEDICINE

## 2025-05-14 PROCEDURE — 85025 COMPLETE CBC W/AUTO DIFF WBC: CPT

## 2025-05-14 PROCEDURE — 6360000002 HC RX W HCPCS: Performed by: PODIATRIST

## 2025-05-14 PROCEDURE — 0QBL0ZZ EXCISION OF RIGHT TARSAL, OPEN APPROACH: ICD-10-PCS | Performed by: PODIATRIST

## 2025-05-14 PROCEDURE — 7100000001 HC PACU RECOVERY - ADDTL 15 MIN: Performed by: PODIATRIST

## 2025-05-14 PROCEDURE — 6370000000 HC RX 637 (ALT 250 FOR IP): Performed by: INTERNAL MEDICINE

## 2025-05-14 PROCEDURE — 3700000000 HC ANESTHESIA ATTENDED CARE: Performed by: PODIATRIST

## 2025-05-14 PROCEDURE — 87070 CULTURE OTHR SPECIMN AEROBIC: CPT

## 2025-05-14 RX ORDER — BUPIVACAINE HYDROCHLORIDE 5 MG/ML
INJECTION, SOLUTION EPIDURAL; INTRACAUDAL; PERINEURAL PRN
Status: DISCONTINUED | OUTPATIENT
Start: 2025-05-14 | End: 2025-05-14 | Stop reason: ALTCHOICE

## 2025-05-14 RX ORDER — SODIUM CHLORIDE 9 MG/ML
INJECTION, SOLUTION INTRAVENOUS
Status: DISCONTINUED | OUTPATIENT
Start: 2025-05-14 | End: 2025-05-14 | Stop reason: SDUPTHER

## 2025-05-14 RX ORDER — PROPOFOL 10 MG/ML
INJECTION, EMULSION INTRAVENOUS
Status: DISCONTINUED | OUTPATIENT
Start: 2025-05-14 | End: 2025-05-14 | Stop reason: SDUPTHER

## 2025-05-14 RX ORDER — ACETAMINOPHEN 650 MG
TABLET, EXTENDED RELEASE ORAL PRN
Status: DISCONTINUED | OUTPATIENT
Start: 2025-05-14 | End: 2025-05-14 | Stop reason: ALTCHOICE

## 2025-05-14 RX ORDER — FENTANYL CITRATE 50 UG/ML
INJECTION, SOLUTION INTRAMUSCULAR; INTRAVENOUS
Status: DISCONTINUED | OUTPATIENT
Start: 2025-05-14 | End: 2025-05-14 | Stop reason: SDUPTHER

## 2025-05-14 RX ORDER — DEXMEDETOMIDINE HYDROCHLORIDE 100 UG/ML
INJECTION, SOLUTION INTRAVENOUS
Status: DISCONTINUED | OUTPATIENT
Start: 2025-05-14 | End: 2025-05-14 | Stop reason: SDUPTHER

## 2025-05-14 RX ORDER — LIDOCAINE HYDROCHLORIDE 20 MG/ML
INJECTION, SOLUTION INTRAVENOUS
Status: DISCONTINUED | OUTPATIENT
Start: 2025-05-14 | End: 2025-05-14 | Stop reason: SDUPTHER

## 2025-05-14 RX ADMIN — TAMSULOSIN HYDROCHLORIDE 0.4 MG: 0.4 CAPSULE ORAL at 20:55

## 2025-05-14 RX ADMIN — OXYCODONE AND ACETAMINOPHEN 1 TABLET: 5; 325 TABLET ORAL at 16:34

## 2025-05-14 RX ADMIN — CEFEPIME 2000 MG: 2 INJECTION, POWDER, FOR SOLUTION INTRAVENOUS at 14:53

## 2025-05-14 RX ADMIN — POLYVINYL ALCOHOL, POVIDONE 2 DROP: 14; 6 SOLUTION/ DROPS OPHTHALMIC at 21:17

## 2025-05-14 RX ADMIN — GABAPENTIN 300 MG: 300 CAPSULE ORAL at 20:55

## 2025-05-14 RX ADMIN — METOPROLOL SUCCINATE 12.5 MG: 25 TABLET, FILM COATED, EXTENDED RELEASE ORAL at 08:56

## 2025-05-14 RX ADMIN — INSULIN GLARGINE 5 UNITS: 100 INJECTION, SOLUTION SUBCUTANEOUS at 21:08

## 2025-05-14 RX ADMIN — PROPOFOL 60 MG: 10 INJECTION, EMULSION INTRAVENOUS at 17:43

## 2025-05-14 RX ADMIN — INSULIN LISPRO 1 UNITS: 100 INJECTION, SOLUTION INTRAVENOUS; SUBCUTANEOUS at 21:08

## 2025-05-14 RX ADMIN — SODIUM CHLORIDE: 9 INJECTION, SOLUTION INTRAVENOUS at 17:39

## 2025-05-14 RX ADMIN — DEXMEDETOMIDINE 12 MCG: 100 INJECTION, SOLUTION INTRAVENOUS at 17:43

## 2025-05-14 RX ADMIN — PROPOFOL 75 MG: 10 INJECTION, EMULSION INTRAVENOUS at 17:44

## 2025-05-14 RX ADMIN — FENTANYL CITRATE 50 MCG: 50 INJECTION, SOLUTION INTRAMUSCULAR; INTRAVENOUS at 17:54

## 2025-05-14 RX ADMIN — OXYCODONE AND ACETAMINOPHEN 1 TABLET: 5; 325 TABLET ORAL at 20:55

## 2025-05-14 RX ADMIN — OXYCODONE AND ACETAMINOPHEN 1 TABLET: 5; 325 TABLET ORAL at 11:21

## 2025-05-14 RX ADMIN — FENTANYL CITRATE 50 MCG: 50 INJECTION, SOLUTION INTRAMUSCULAR; INTRAVENOUS at 17:47

## 2025-05-14 RX ADMIN — LIDOCAINE HYDROCHLORIDE 100 MG: 20 INJECTION, SOLUTION INTRAVENOUS at 17:43

## 2025-05-14 RX ADMIN — ATORVASTATIN CALCIUM 40 MG: 40 TABLET, FILM COATED ORAL at 20:55

## 2025-05-14 RX ADMIN — CLOPIDOGREL BISULFATE 75 MG: 75 TABLET, FILM COATED ORAL at 08:56

## 2025-05-14 RX ADMIN — OXYCODONE AND ACETAMINOPHEN 1 TABLET: 5; 325 TABLET ORAL at 01:31

## 2025-05-14 RX ADMIN — GABAPENTIN 300 MG: 300 CAPSULE ORAL at 08:56

## 2025-05-14 RX ADMIN — Medication 500 MG: at 20:55

## 2025-05-14 RX ADMIN — CEFEPIME 2000 MG: 2 INJECTION, POWDER, FOR SOLUTION INTRAVENOUS at 04:58

## 2025-05-14 RX ADMIN — CEFEPIME 2000 MG: 2 INJECTION, POWDER, FOR SOLUTION INTRAVENOUS at 21:19

## 2025-05-14 RX ADMIN — PROPOFOL 100 MCG/KG/MIN: 10 INJECTION, EMULSION INTRAVENOUS at 17:45

## 2025-05-14 ASSESSMENT — PAIN DESCRIPTION - LOCATION
LOCATION: FOOT

## 2025-05-14 ASSESSMENT — LIFESTYLE VARIABLES: SMOKING_STATUS: 0

## 2025-05-14 ASSESSMENT — PAIN SCALES - GENERAL
PAINLEVEL_OUTOF10: 10
PAINLEVEL_OUTOF10: 4
PAINLEVEL_OUTOF10: 10
PAINLEVEL_OUTOF10: 6
PAINLEVEL_OUTOF10: 0
PAINLEVEL_OUTOF10: 8
PAINLEVEL_OUTOF10: 8
PAINLEVEL_OUTOF10: 0

## 2025-05-14 ASSESSMENT — PAIN DESCRIPTION - DESCRIPTORS
DESCRIPTORS: SHARP
DESCRIPTORS: ACHING;BURNING;CRAMPING;DISCOMFORT
DESCRIPTORS: ACHING;BURNING;CRUSHING;DISCOMFORT

## 2025-05-14 ASSESSMENT — PAIN DESCRIPTION - ORIENTATION
ORIENTATION: RIGHT
ORIENTATION: RIGHT
ORIENTATION: LEFT;RIGHT
ORIENTATION: RIGHT

## 2025-05-14 NOTE — PLAN OF CARE
Problem: Chronic Conditions and Co-morbidities  Goal: Patient's chronic conditions and co-morbidity symptoms are monitored and maintained or improved  5/13/2025 2223 by Natacha Juárez RN  Outcome: Progressing  5/13/2025 1044 by Anh Tomlinson RN  Outcome: Progressing     Problem: Discharge Planning  Goal: Discharge to home or other facility with appropriate resources  5/13/2025 2223 by Natacha Juárez RN  Outcome: Progressing  5/13/2025 1044 by Anh Tomlinson RN  Outcome: Progressing     Problem: Safety - Adult  Goal: Free from fall injury  5/13/2025 2223 by Natacha Juárez RN  Outcome: Progressing  5/13/2025 1044 by Anh Tomlinson RN  Outcome: Progressing

## 2025-05-14 NOTE — ANESTHESIA POSTPROCEDURE EVALUATION
Department of Anesthesiology  Postprocedure Note    Patient: Jerome Steel  MRN: [id number]  YOB: 1960  Date of evaluation: 5/14/2025    Procedure Summary       Date: 05/14/25 Room / Location: [address]    Anesthesia Start: 1739 Anesthesia Stop: 1819    Procedure: FOOT DEBRIDEMENT OF RIGHT HEEL WOUND (Right: Foot) Diagnosis:       Open wound of right heel, subsequent encounter      (Open wound of right heel, subsequent encounter [S91.301D])    Surgeons: Rober Harrell DPM Responsible Provider: Betty Jovel DO    Anesthesia Type: MAC ASA Status: 4            Anesthesia Type: MAC    Stephen Phase I: Stephen Score: 10    Stephen Phase II:      Anesthesia Post Evaluation    Patient location during evaluation: PACU  Patient participation: complete - patient participated  Level of consciousness: awake  Airway patency: patent  Nausea & Vomiting: no nausea and no vomiting  Cardiovascular status: hemodynamically stable  Respiratory status: acceptable  Hydration status: stable  Pain management: adequate    No notable events documented.

## 2025-05-14 NOTE — DISCHARGE INSTRUCTIONS
Place the patients taylor to leg bag on discharge from the hospital.  Please give the patient a night bag (large bag) and taylor instructions upon discharge.  Please have the patient contact the office for taylor removal instructions.  The catheter may go red (hematuria), may go clear, and may go red.  This is a normal process, as long as the catheter is draining.  Call the office if any concerns should arise for further instructions, [phone].      Call upon discharge to schedule a follow-up visit with Mayra Rodriguez/Meghan/Evelyn ([facility]) at [phone]      Learning About Indwelling Urinary Catheter Care to Prevent Infection  Overview     A urinary catheter is a flexible plastic tube that's used to drain urine from your bladder when you can't urinate on your own. The catheter allows urine to drain from the bladder into a bag.  Two types of drainage bags may be used with a urinary catheter.  A bedside bag is a large bag that you can hang on the side of your bed or on a chair. You can use it overnight or anytime you will be sitting or lying down for a long time.  A leg bag is a small bag that you can use during the day. It is usually attached to your thigh or calf and hidden under your clothes.  Having a urinary catheter increases your risk of getting a urinary tract infection. Germs may get on the catheter and cause an infection in your bladder or kidneys. The longer you have a catheter, the more likely it is that you will get an infection. You can help prevent this problem with good hygiene and careful handling of your catheter and drainage bags.  How can you help prevent infection?  Take care to stay clean  Always wash your hands well before and after you handle your catheter.  Clean the skin around the catheter daily using soap and water. Dry with a clean towel afterward. You can shower with your catheter and drainage bag in place unless your doctor told you not to.  When you clean around the catheter,

## 2025-05-14 NOTE — BRIEF OP NOTE
Brief Postoperative Note      Patient: Jerome Steel  YOB: 1960  MRN: [id number]    Date of Procedure: 5/14/2025    Pre-Op Diagnosis Codes:      * Open wound of right heel, subsequent encounter [S91.301D]    Post-Op Diagnosis: Same       Procedure(s):  FOOT DEBRIDEMENT OF RIGHT HEEL WOUND    Surgeon(s):  Rober Harrell DPM Kubala, Daniel, DPM    Assistant:  * No surgical staff found *    Anesthesia: Monitor Anesthesia Care    Estimated Blood Loss (mL): Minimal    Complications: None    Specimens:   * No specimens in log *    Implants:  * No implants in log *      Drains:   Urinary Catheter 05/12/25 (Active)   $ Urethral catheter insertion $ Not inserted for procedure 05/13/25 1550   Catheter Indications Urinary retention (acute or chronic), continuous bladder irrigation or bladder outlet obstruction 05/14/25 1600   Site Assessment No urethral drainage;Swelling 05/14/25 1600   Urine Color Yellow 05/14/25 1600   Urine Appearance Clear 05/14/25 1600   Urine Odor Other (Comment) 05/13/25 2000   Collection Container Standard 05/14/25 1600   Securement Method Securing device (Describe) 05/14/25 1600   Catheter Care  Soap and water 05/14/25 0631   Catheter Best Practices  Drainage tube clipped to bed;Catheter secured to thigh;Tamper seal intact;Bag below bladder;Bag not on floor;Lack of dependent loop in tubing;Drainage bag less than half full 05/14/25 1600   Status Patent;Draining 05/14/25 1600   Output (mL) 250 mL 05/14/25 1419       [REMOVED] Chest Tube Anterior Mediastinal 1 (Removed)   Chest Tube Airleak No 04/18/25 0800   Status Continuous Suction 04/18/25 0600   Suction -20 cm H2O 04/18/25 0600   Y Connector Used Yes 04/18/25 0800   Drainage Description Serosanguinous 04/18/25 0800   Dressing Status Clean, dry & intact 04/18/25 0800   Chest Tube Dressing Dry 04/18/25 0400   Site Assessment Clean, dry & intact 04/18/25 0800   Surrounding Skin Clean, dry & intact 04/18/25 0800   Output (ml) 20 ml

## 2025-05-14 NOTE — CONSULTS
Comprehensive Nutrition Assessment    Type and Reason for Visit:  Initial, Consult, Wound    Nutrition Recommendations/Plan:   Continue NPO  ADAT when able & add ONS     Malnutrition Assessment:  Malnutrition Status:  Moderate malnutrition (05/14/25 1418)    Context:  Chronic Illness     Findings of the 6 clinical characteristics of malnutrition:  Energy Intake:  75% or less estimated energy requirements for 1 month or longer  Weight Loss:  Unable to assess (d/t fluid shifts)     Body Fat Loss:   (moderate) Orbital, Triceps   Muscle Mass Loss:   (moderate) Temples (temporalis), Clavicles (pectoralis & deltoids)  Fluid Accumulation:  No fluid accumulation     Strength:  Not Performed    Nutrition Assessment:    Pt admit from ECF w/ +blood cultures. Noted strep bacteremia, recent complicated catheter associated UTI. Noted R heel r/o OM - podiatry following. 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 Pt meets criteria for moderate malnutrition. Pt currently NPO pending debridement. Will monitor for nutrition progression and add ONS when able.    Nutrition Related Findings:    A&Ox4, active BS, soft abd, no edema, fluids WDL, hyperglycemia Wound Type: Multiple, Surgical Incision, Open Wounds (heel wound - podiatry following)       Current Nutrition Intake & Therapies:    Average Meal Intake: NPO (average 1-25% prior to NPO)  Average Supplements Intake: NPO  Diet NPO Exceptions are: Sips of Water with Meds  Diet NPO    Anthropometric Measures:  Height: 180.3 cm (5' 11\")  Ideal Body Weight (IBW): 172 lbs (78 kg)    Admission Body Weight: 74.4 kg (164 lb) (4/2025 recent measured wt)  Current Body Weight: 75.3 kg (166 lb) (5/14 stated wt), 96.5 % IBW.    Current BMI (kg/m2): 23.2  Usual Body Weight:  (147-173# measured wt x 5 months per EMR)                          BMI Categories: Normal Weight (BMI 22.0 to 24.9) age over 65    Estimated Daily Nutrient

## 2025-05-14 NOTE — CARE COORDINATION
Received call from Joyce at [facility] after hours on 5/13, pt is from [facility], not a bed hold, will need precert to return. They will accept back if they have a bed available. Pt for debridement of heel today. Envelope started, will need therapy recommendations for transport. Pt on IV cefepime q8. Will follow for discharge planning/transition of care.GINNY Morris, ANGELO  .Case Management Assessment  Initial Evaluation    Date/Time of Evaluation: 5/14/2025 10:31 AM  Assessment Completed by: GINNY Morris, ANGELO    If patient is discharged prior to next notation, then this note serves as note for discharge by case management.    Patient Name: Jerome Steel                   YOB: 1960  Diagnosis: Bacteremia [R78.81]                   Date / Time: 5/12/2025 10:24 AM    Patient Admission Status: Observation   Readmission Risk (Low < 19, Mod (19-27), High > 27): Readmission Risk Score: 26.1    Current PCP: Genet Kraus, DO  PCP verified by CM? Yes    Chart Reviewed: Yes      History Provided by: Patient  Patient Orientation: Alert and Oriented    Patient Cognition: Alert    Hospitalization in the last 30 days (Readmission):  Yes    If yes, Readmission Assessment in CM Navigator will be completed.    Advance Directives:      Code Status: Full Code   Patient's Primary Decision Maker is: Legal Next of Kin    Primary Decision Maker: Yimi Steel - Brother/Sister - [phone]    Secondary Decision Maker: miguelina julian - [place] - [phone]    Discharge Planning:    Patient lives with: Alone Type of Home: Skilled Nursing Facility  Primary Care Giver: Other (Comment) ([facility])  Patient Support Systems include: Family Members   Current Financial resources:    Current community resources:    Current services prior to admission: Skilled Nursing Facility            Current DME:              Type of Home Care services:  Nursing Services, PT, OT    ADLS  Prior functional level:

## 2025-05-14 NOTE — ANESTHESIA PRE PROCEDURE
Department of Anesthesiology  Preprocedure Note       Name:  Jerome Steel   Age:  64 y.o.  :  1960                                          MRN:  [id number]         Date:  2025      Surgeon: Surgeon(s):  Rober Harrell DPM    Procedure: Procedure(s):  FOOT DEBRIDEMENT OF RIGHT HEEL WOUND    Medications prior to admission:   Prior to Admission medications    Medication Sig Start Date End Date Taking? Authorizing Provider   cefdinir (OMNICEF) 300 MG capsule Take 1 capsule by mouth 2 times daily for 7 days 25  Yousuf Mcgill MD   insulin glargine (LANTUS) 100 UNIT/ML injection vial Inject 5 Units into the skin nightly 25   Aki iLnda MD   insulin lispro (HUMALOG,ADMELOG) 100 UNIT/ML SOLN injection vial Inject 0-6 Units into the skin 4 times daily (before meals and nightly) **Corrective Low Dose Algorithm**  Glucose: Dose:             No Insulin  150-200 1 Unit  201-250 2 Units  251-300 3 Units  301-350 4 Units  351-400 5 Units  Over 400 6 Units 25   Aki Linda MD   aspirin 81 MG EC tablet Take 1 tablet by mouth daily 25   Soraida Colorado APRN - CNP   enoxaparin (LOVENOX) 40 MG/0.4ML Inject 0.4 mLs into the skin daily Continue until consistently ambulating 400ft 25  Soraida Colorado APRN - CNP   atorvastatin (LIPITOR) 40 MG tablet Take 1 tablet by mouth nightly 25   Soraida Colorado APRN - CNP   metoprolol succinate (TOPROL XL) 25 MG extended release tablet Take 0.5 tablets by mouth daily 25   Soraida Colorado APRN - CNP   magnesium oxide (MAG-OX) 400 (240 Mg) MG tablet Take 1 tablet by mouth 2 times daily for 14 days 25  Soraida Colorado APRN - CNP   clopidogrel (PLAVIX) 75 MG tablet Take 1 tablet by mouth daily 25  Soraida Colorado APRN - CNP   ascorbic acid (VITAMIN C) 500 MG tablet Take 1 tablet by mouth 2 times daily 25  Soraida Colorado APRN - CNP   docusate sodium (COLACE) 100 MG

## 2025-05-15 ENCOUNTER — APPOINTMENT (OUTPATIENT)
Age: 65
DRG: 616 | End: 2025-05-15
Payer: COMMERCIAL

## 2025-05-15 LAB
ANION GAP SERPL CALCULATED.3IONS-SCNC: 11 MMOL/L (ref 7–16)
BASOPHILS # BLD: 0.07 K/UL (ref 0–0.2)
BASOPHILS NFR BLD: 1 % (ref 0–2)
BUN SERPL-MCNC: 20 MG/DL (ref 8–23)
CALCIUM SERPL-MCNC: 8.6 MG/DL (ref 8.8–10.2)
CHLORIDE SERPL-SCNC: 103 MMOL/L (ref 98–107)
CO2 SERPL-SCNC: 18 MMOL/L (ref 22–29)
CREAT SERPL-MCNC: 0.6 MG/DL (ref 0.7–1.2)
ECHO AO ASC DIAM: 3.6 CM
ECHO AO ASCENDING AORTA INDEX: 1.86 CM/M2
ECHO AR MAX VEL PISA: 4.1 M/S
ECHO AV AREA PEAK VELOCITY: 1.5 CM2
ECHO AV AREA VTI: 1.6 CM2
ECHO AV AREA/BSA PEAK VELOCITY: 0.8 CM2/M2
ECHO AV AREA/BSA VTI: 0.8 CM2/M2
ECHO AV CUSP MM: 1 CM
ECHO AV MEAN GRADIENT: 17 MMHG
ECHO AV MEAN VELOCITY: 2 M/S
ECHO AV PEAK GRADIENT: 28 MMHG
ECHO AV PEAK VELOCITY: 2.6 M/S
ECHO AV REGURGITANT PHT: 416 MS
ECHO AV VELOCITY RATIO: 0.42
ECHO AV VTI: 62.3 CM
ECHO BSA: 1.94 M2
ECHO EST RA PRESSURE: 3 MMHG
ECHO LA DIAMETER INDEX: 2.27 CM/M2
ECHO LA DIAMETER: 4.4 CM
ECHO LA VOL A-L A2C: 114 ML (ref 18–58)
ECHO LA VOL A-L A4C: 91 ML (ref 18–58)
ECHO LA VOL BP: 97 ML (ref 18–58)
ECHO LA VOL MOD A2C: 109 ML (ref 18–58)
ECHO LA VOL MOD A4C: 86 ML (ref 18–58)
ECHO LA VOL/BSA BIPLANE: 50 ML/M2 (ref 16–34)
ECHO LA VOLUME AREA LENGTH: 102 ML
ECHO LA VOLUME INDEX A-L A2C: 59 ML/M2 (ref 16–34)
ECHO LA VOLUME INDEX A-L A4C: 47 ML/M2 (ref 16–34)
ECHO LA VOLUME INDEX AREA LENGTH: 53 ML/M2 (ref 16–34)
ECHO LA VOLUME INDEX MOD A2C: 56 ML/M2 (ref 16–34)
ECHO LA VOLUME INDEX MOD A4C: 44 ML/M2 (ref 16–34)
ECHO LV EJECTION FRACTION 3D: 60 %
ECHO LV FRACTIONAL SHORTENING: 35 % (ref 28–44)
ECHO LV INTERNAL DIMENSION DIASTOLE INDEX: 2.37 CM/M2
ECHO LV INTERNAL DIMENSION DIASTOLIC: 4.6 CM (ref 4.2–5.9)
ECHO LV INTERNAL DIMENSION SYSTOLIC INDEX: 1.55 CM/M2
ECHO LV INTERNAL DIMENSION SYSTOLIC: 3 CM
ECHO LV ISOVOLUMETRIC RELAXATION TIME (IVRT): 76.1 MS
ECHO LV IVSD: 1 CM (ref 0.6–1)
ECHO LV IVSS: 1.5 CM
ECHO LV MASS 2D: 158.8 G (ref 88–224)
ECHO LV MASS INDEX 2D: 81.9 G/M2 (ref 49–115)
ECHO LV POSTERIOR WALL DIASTOLIC: 1 CM (ref 0.6–1)
ECHO LV POSTERIOR WALL SYSTOLIC: 1.3 CM
ECHO LV RELATIVE WALL THICKNESS RATIO: 0.43
ECHO LVOT AREA: 3.5 CM2
ECHO LVOT AV VTI INDEX: 0.45
ECHO LVOT DIAM: 2.1 CM
ECHO LVOT MEAN GRADIENT: 3 MMHG
ECHO LVOT PEAK GRADIENT: 5 MMHG
ECHO LVOT PEAK VELOCITY: 1.1 M/S
ECHO LVOT STROKE VOLUME INDEX: 49.8 ML/M2
ECHO LVOT SV: 96.6 ML
ECHO LVOT VTI: 27.9 CM
ECHO MV "A" WAVE DURATION: 148.4 MSEC
ECHO MV A VELOCITY: 1.03 M/S
ECHO MV AREA PHT: 2.5 CM2
ECHO MV AREA VTI: 2.7 CM2
ECHO MV E DECELERATION TIME (DT): 270.8 MS
ECHO MV E VELOCITY: 1.16 M/S
ECHO MV E/A RATIO: 1.13
ECHO MV LVOT VTI INDEX: 1.28
ECHO MV MAX VELOCITY: 1.2 M/S
ECHO MV MEAN GRADIENT: 2 MMHG
ECHO MV MEAN VELOCITY: 0.7 M/S
ECHO MV PEAK GRADIENT: 5 MMHG
ECHO MV PRESSURE HALF TIME (PHT): 88.3 MS
ECHO MV VTI: 35.6 CM
ECHO PV MAX VELOCITY: 0.9 M/S
ECHO PV MEAN GRADIENT: 2 MMHG
ECHO PV MEAN VELOCITY: 0.7 M/S
ECHO PV PEAK GRADIENT: 3 MMHG
ECHO PV VTI: 23.2 CM
ECHO PVEIN A DURATION: 118 MS
ECHO PVEIN A VELOCITY: 0.2 M/S
ECHO PVEIN PEAK D VELOCITY: 0.4 M/S
ECHO PVEIN PEAK S VELOCITY: 0.6 M/S
ECHO PVEIN S/D RATIO: 1.5
ECHO RIGHT VENTRICULAR SYSTOLIC PRESSURE (RVSP): 30 MMHG
ECHO RV LONGITUDINAL DIMENSION: 6.7 CM
ECHO RV MID DIMENSION: 3.9 CM
ECHO TV REGURGITANT MAX VELOCITY: 2.59 M/S
ECHO TV REGURGITANT PEAK GRADIENT: 27 MMHG
EOSINOPHIL # BLD: 0.5 K/UL (ref 0.05–0.5)
EOSINOPHILS RELATIVE PERCENT: 5 % (ref 0–6)
ERYTHROCYTE [DISTWIDTH] IN BLOOD BY AUTOMATED COUNT: 16 % (ref 11.5–15)
GFR, ESTIMATED: >90 ML/MIN/1.73M2
GLUCOSE BLD-MCNC: 113 MG/DL (ref 74–99)
GLUCOSE BLD-MCNC: 147 MG/DL (ref 74–99)
GLUCOSE BLD-MCNC: 150 MG/DL (ref 74–99)
GLUCOSE BLD-MCNC: 187 MG/DL (ref 74–99)
GLUCOSE SERPL-MCNC: 155 MG/DL (ref 74–99)
HCT VFR BLD AUTO: 27.1 % (ref 37–54)
HGB BLD-MCNC: 8.4 G/DL (ref 12.5–16.5)
IMM GRANULOCYTES # BLD AUTO: 0.09 K/UL (ref 0–0.58)
IMM GRANULOCYTES NFR BLD: 1 % (ref 0–5)
LYMPHOCYTES NFR BLD: 1.45 K/UL (ref 1.5–4)
LYMPHOCYTES RELATIVE PERCENT: 13 % (ref 20–42)
MCH RBC QN AUTO: 27.9 PG (ref 26–35)
MCHC RBC AUTO-ENTMCNC: 31 G/DL (ref 32–34.5)
MCV RBC AUTO: 90 FL (ref 80–99.9)
MONOCYTES NFR BLD: 0.49 K/UL (ref 0.1–0.95)
MONOCYTES NFR BLD: 5 % (ref 2–12)
NEUTROPHILS NFR BLD: 76 % (ref 43–80)
NEUTS SEG NFR BLD: 8.22 K/UL (ref 1.8–7.3)
PLATELET CONFIRMATION: NORMAL
PLATELET, FLUORESCENCE: 339 K/UL (ref 130–450)
PMV BLD AUTO: 9.9 FL (ref 7–12)
POTASSIUM SERPL-SCNC: 4.3 MMOL/L (ref 3.5–5.1)
RBC # BLD AUTO: 3.01 M/UL (ref 3.8–5.8)
SODIUM SERPL-SCNC: 132 MMOL/L (ref 136–145)
WBC OTHER # BLD: 10.8 K/UL (ref 4.5–11.5)

## 2025-05-15 PROCEDURE — 93306 TTE W/DOPPLER COMPLETE: CPT | Performed by: INTERNAL MEDICINE

## 2025-05-15 PROCEDURE — 85025 COMPLETE CBC W/AUTO DIFF WBC: CPT

## 2025-05-15 PROCEDURE — 82962 GLUCOSE BLOOD TEST: CPT

## 2025-05-15 PROCEDURE — 2500000003 HC RX 250 WO HCPCS: Performed by: INTERNAL MEDICINE

## 2025-05-15 PROCEDURE — 2500000003 HC RX 250 WO HCPCS

## 2025-05-15 PROCEDURE — 97161 PT EVAL LOW COMPLEX 20 MIN: CPT

## 2025-05-15 PROCEDURE — 97530 THERAPEUTIC ACTIVITIES: CPT

## 2025-05-15 PROCEDURE — 6360000002 HC RX W HCPCS: Performed by: NURSE PRACTITIONER

## 2025-05-15 PROCEDURE — 99232 SBSQ HOSP IP/OBS MODERATE 35: CPT | Performed by: INTERNAL MEDICINE

## 2025-05-15 PROCEDURE — 2060000000 HC ICU INTERMEDIATE R&B

## 2025-05-15 PROCEDURE — 51702 INSERT TEMP BLADDER CATH: CPT

## 2025-05-15 PROCEDURE — 36415 COLL VENOUS BLD VENIPUNCTURE: CPT

## 2025-05-15 PROCEDURE — 2580000003 HC RX 258: Performed by: INTERNAL MEDICINE

## 2025-05-15 PROCEDURE — 93306 TTE W/DOPPLER COMPLETE: CPT

## 2025-05-15 PROCEDURE — 80048 BASIC METABOLIC PNL TOTAL CA: CPT

## 2025-05-15 PROCEDURE — 2500000003 HC RX 250 WO HCPCS: Performed by: NURSE PRACTITIONER

## 2025-05-15 PROCEDURE — 97166 OT EVAL MOD COMPLEX 45 MIN: CPT

## 2025-05-15 PROCEDURE — 6370000000 HC RX 637 (ALT 250 FOR IP): Performed by: INTERNAL MEDICINE

## 2025-05-15 PROCEDURE — 6360000002 HC RX W HCPCS: Performed by: INTERNAL MEDICINE

## 2025-05-15 PROCEDURE — 6370000000 HC RX 637 (ALT 250 FOR IP): Performed by: NURSE PRACTITIONER

## 2025-05-15 PROCEDURE — 97535 SELF CARE MNGMENT TRAINING: CPT

## 2025-05-15 PROCEDURE — 6360000002 HC RX W HCPCS

## 2025-05-15 RX ORDER — ENOXAPARIN SODIUM 100 MG/ML
40 INJECTION SUBCUTANEOUS DAILY
Status: DISCONTINUED | OUTPATIENT
Start: 2025-05-15 | End: 2025-05-19

## 2025-05-15 RX ORDER — SODIUM CHLORIDE 0.9 % (FLUSH) 0.9 %
5-40 SYRINGE (ML) INJECTION PRN
Status: DISCONTINUED | OUTPATIENT
Start: 2025-05-15 | End: 2025-05-23 | Stop reason: HOSPADM

## 2025-05-15 RX ORDER — ONDANSETRON 4 MG/1
4 TABLET, ORALLY DISINTEGRATING ORAL EVERY 8 HOURS PRN
Status: DISCONTINUED | OUTPATIENT
Start: 2025-05-15 | End: 2025-05-23 | Stop reason: HOSPADM

## 2025-05-15 RX ORDER — ONDANSETRON 2 MG/ML
4 INJECTION INTRAMUSCULAR; INTRAVENOUS EVERY 6 HOURS PRN
Status: DISCONTINUED | OUTPATIENT
Start: 2025-05-15 | End: 2025-05-23 | Stop reason: HOSPADM

## 2025-05-15 RX ORDER — SODIUM CHLORIDE 0.9 % (FLUSH) 0.9 %
5-40 SYRINGE (ML) INJECTION EVERY 12 HOURS SCHEDULED
Status: DISCONTINUED | OUTPATIENT
Start: 2025-05-15 | End: 2025-05-23 | Stop reason: HOSPADM

## 2025-05-15 RX ORDER — SODIUM CHLORIDE 9 MG/ML
INJECTION, SOLUTION INTRAVENOUS PRN
Status: DISCONTINUED | OUTPATIENT
Start: 2025-05-15 | End: 2025-05-23 | Stop reason: HOSPADM

## 2025-05-15 RX ADMIN — GABAPENTIN 300 MG: 300 CAPSULE ORAL at 10:13

## 2025-05-15 RX ADMIN — Medication 500 MG: at 10:13

## 2025-05-15 RX ADMIN — SODIUM CHLORIDE, PRESERVATIVE FREE 10 ML: 5 INJECTION INTRAVENOUS at 20:49

## 2025-05-15 RX ADMIN — PANTOPRAZOLE SODIUM 40 MG: 40 TABLET, DELAYED RELEASE ORAL at 10:12

## 2025-05-15 RX ADMIN — SODIUM CHLORIDE, PRESERVATIVE FREE 10 ML: 5 INJECTION INTRAVENOUS at 20:48

## 2025-05-15 RX ADMIN — SODIUM CHLORIDE, PRESERVATIVE FREE 10 ML: 5 INJECTION INTRAVENOUS at 10:14

## 2025-05-15 RX ADMIN — GABAPENTIN 300 MG: 300 CAPSULE ORAL at 20:45

## 2025-05-15 RX ADMIN — OXYCODONE AND ACETAMINOPHEN 1 TABLET: 5; 325 TABLET ORAL at 05:39

## 2025-05-15 RX ADMIN — INSULIN LISPRO 1 UNITS: 100 INJECTION, SOLUTION INTRAVENOUS; SUBCUTANEOUS at 20:55

## 2025-05-15 RX ADMIN — CEFEPIME 2000 MG: 2 INJECTION, POWDER, FOR SOLUTION INTRAVENOUS at 05:47

## 2025-05-15 RX ADMIN — Medication 500 MG: at 20:45

## 2025-05-15 RX ADMIN — CLOPIDOGREL BISULFATE 75 MG: 75 TABLET, FILM COATED ORAL at 10:13

## 2025-05-15 RX ADMIN — FLUCONAZOLE 400 MG: 150 TABLET ORAL at 12:41

## 2025-05-15 RX ADMIN — ENOXAPARIN SODIUM 40 MG: 100 INJECTION SUBCUTANEOUS at 10:17

## 2025-05-15 RX ADMIN — METOPROLOL SUCCINATE 12.5 MG: 25 TABLET, FILM COATED, EXTENDED RELEASE ORAL at 10:14

## 2025-05-15 RX ADMIN — ATORVASTATIN CALCIUM 40 MG: 40 TABLET, FILM COATED ORAL at 20:45

## 2025-05-15 RX ADMIN — FAMOTIDINE 40 MG: 20 TABLET, FILM COATED ORAL at 10:12

## 2025-05-15 RX ADMIN — INSULIN GLARGINE 5 UNITS: 100 INJECTION, SOLUTION SUBCUTANEOUS at 20:54

## 2025-05-15 RX ADMIN — OXYCODONE AND ACETAMINOPHEN 1 TABLET: 5; 325 TABLET ORAL at 10:12

## 2025-05-15 RX ADMIN — OXYCODONE AND ACETAMINOPHEN 1 TABLET: 5; 325 TABLET ORAL at 14:12

## 2025-05-15 RX ADMIN — FOLIC ACID 1000 MCG: 1 TABLET ORAL at 10:13

## 2025-05-15 RX ADMIN — ASPIRIN 81 MG: 81 TABLET, COATED ORAL at 12:41

## 2025-05-15 RX ADMIN — WATER 2000 MG: 1 INJECTION INTRAMUSCULAR; INTRAVENOUS; SUBCUTANEOUS at 12:40

## 2025-05-15 RX ADMIN — OXYCODONE AND ACETAMINOPHEN 1 TABLET: 5; 325 TABLET ORAL at 01:16

## 2025-05-15 RX ADMIN — TAMSULOSIN HYDROCHLORIDE 0.4 MG: 0.4 CAPSULE ORAL at 20:45

## 2025-05-15 RX ADMIN — OXYCODONE AND ACETAMINOPHEN 1 TABLET: 5; 325 TABLET ORAL at 22:26

## 2025-05-15 RX ADMIN — OXYCODONE AND ACETAMINOPHEN 1 TABLET: 5; 325 TABLET ORAL at 18:17

## 2025-05-15 ASSESSMENT — PAIN SCALES - GENERAL
PAINLEVEL_OUTOF10: 8
PAINLEVEL_OUTOF10: 8
PAINLEVEL_OUTOF10: 10
PAINLEVEL_OUTOF10: 0
PAINLEVEL_OUTOF10: 10
PAINLEVEL_OUTOF10: 8
PAINLEVEL_OUTOF10: 3
PAINLEVEL_OUTOF10: 10
PAINLEVEL_OUTOF10: 3
PAINLEVEL_OUTOF10: 5

## 2025-05-15 ASSESSMENT — PAIN DESCRIPTION - LOCATION
LOCATION: FOOT

## 2025-05-15 ASSESSMENT — PAIN DESCRIPTION - DESCRIPTORS
DESCRIPTORS: SHARP
DESCRIPTORS: SHARP
DESCRIPTORS: ACHING;BURNING;CRAMPING;CRUSHING;DISCOMFORT
DESCRIPTORS: SHARP
DESCRIPTORS: DULL;ACHING
DESCRIPTORS: ACHING;BURNING;CRAMPING;CRUSHING;DISCOMFORT

## 2025-05-15 ASSESSMENT — PAIN DESCRIPTION - ORIENTATION
ORIENTATION: RIGHT

## 2025-05-15 ASSESSMENT — PAIN - FUNCTIONAL ASSESSMENT
PAIN_FUNCTIONAL_ASSESSMENT: ACTIVITIES ARE NOT PREVENTED

## 2025-05-15 NOTE — PLAN OF CARE
Problem: Chronic Conditions and Co-morbidities  Goal: Patient's chronic conditions and co-morbidity symptoms are monitored and maintained or improved  Outcome: Progressing     Problem: Discharge Planning  Goal: Discharge to home or other facility with appropriate resources  Outcome: Progressing     Problem: Safety - Adult  Goal: Free from fall injury  Outcome: Progressing     Problem: Nutrition Deficit:  Goal: Optimize nutritional status  Outcome: Progressing     Problem: ABCDS Injury Assessment  Goal: Absence of physical injury  Outcome: Progressing  Flowsheets (Taken 5/14/2025 1600 by Gwendolyn Huston RN)  Absence of Physical Injury: Implement safety measures based on patient assessment     Problem: Skin/Tissue Integrity  Goal: Skin integrity remains intact  Description: 1.  Monitor for areas of redness and/or skin breakdown2.  Assess vascular access sites hourly3.  Every 4-6 hours minimum:  Change oxygen saturation probe site4.  Every 4-6 hours:  If on nasal continuous positive airway pressure, respiratory therapy assess nares and determine need for appliance change or resting period  Outcome: Progressing     Problem: Pain  Goal: Verbalizes/displays adequate comfort level or baseline comfort level  Outcome: Progressing

## 2025-05-15 NOTE — CARE COORDINATION
Reviewed chart, per Joyce at [facility], they have a continued approval from insurance for pt to return today, if pt can not discharge today, they will need to start a new precert. Pt had debridement 5/14 and now has a wound vac. Met with pt to encourage participation with therapy, pt hoping to stay in hospital until wound vac is completed, explained that once pt is ready for next level of care he will be discharged to facility, pt expressed understanding. Envelope and ambulance form in soft chart.  2:30pm pt for angiogram with vascular 5/16. Updated [facility], precert expires end of today. [facility] pt has a few skilled days left. Back door Referral to [facility] to check criteria, await assessment. 3pm spoke with Joyce at [facility], pt has 12 skilled days left and will need corporate approval to accept back d/t 6 weeks antibiotics. Will await [facility] assessment.Aretha Mireles, MSW, LSW

## 2025-05-15 NOTE — FLOWSHEET NOTE
Inpatient Wound Care(7414)    Admit Date: 5/12/2025 10:24 AM    Reason for consult:  wound vac- followed by podiatry    Wound history:    PREOPERATIVE DIAGNOSES:    1. Acute osteomyelitis, right foot.  2. Cellulitis, right foot.  3. Ulceration of right heel with necrosis of muscle.  4. Insulin-dependent diabetes mellitus with neuropathy.     PROCEDURES:  5/14  1. Excisional wound debridement of right heel wound to and through level of deep fascia and muscle, total measurement 6 cm x 4.5 cm x 0.5 cm in dimension.  2. Incision and drainage, right calcaneus bone.     Findings:     05/15/25 1338   Wound 02/17/25 Heel Right   Date First Assessed/Time First Assessed: 02/17/25 1250   Present on Original Admission: Yes  Primary Wound Type: Pressure Injury  Location: Heel  Wound Location Orientation: Right   Dressing Status Intact   Dressing/Treatment Negative pressure wound therapy   Negative Pressure Wound Therapy Heel Right   Placement Date/Time: 05/15/25 1000   Present on Admission/Arrival: No  Location: Heel  Wound Location Orientation: Right   Dressing Status Intact w/seal   Mode Continuous   Target Pressure (mmHg) 125   Intensity Low     plan:  Chart reviewed  Dietary following   Discharge back to facility when ready   Will place discharge wound vac orders      Kati Martinez RN 5/15/2025 1:39 PM

## 2025-05-15 NOTE — PLAN OF CARE
Problem: Chronic Conditions and Co-morbidities  Goal: Patient's chronic conditions and co-morbidity symptoms are monitored and maintained or improved  Outcome: Progressing     Problem: Discharge Planning  Goal: Discharge to home or other facility with appropriate resources  Outcome: Progressing     Problem: Safety - Adult  Goal: Free from fall injury  Outcome: Progressing     Problem: Nutrition Deficit:  Goal: Optimize nutritional status  Outcome: Progressing     Problem: ABCDS Injury Assessment  Goal: Absence of physical injury  Outcome: Progressing  Flowsheets (Taken 5/15/2025 0800)  Absence of Physical Injury: Implement safety measures based on patient assessment     Problem: Skin/Tissue Integrity  Goal: Skin integrity remains intact  Description: 1.  Monitor for areas of redness and/or skin breakdown2.  Assess vascular access sites hourly3.  Every 4-6 hours minimum:  Change oxygen saturation probe site4.  Every 4-6 hours:  If on nasal continuous positive airway pressure, respiratory therapy assess nares and determine need for appliance change or resting period  Outcome: Progressing  Flowsheets (Taken 5/15/2025 0800)  Skin Integrity Remains Intact: Monitor for areas of redness and/or skin breakdown     Problem: Pain  Goal: Verbalizes/displays adequate comfort level or baseline comfort level  Outcome: Progressing

## 2025-05-16 ENCOUNTER — PREP FOR PROCEDURE (OUTPATIENT)
Dept: VASCULAR SURGERY | Age: 65
End: 2025-05-16

## 2025-05-16 DIAGNOSIS — I73.9 PERIPHERAL VASCULAR DISEASE, UNSPECIFIED: ICD-10-CM

## 2025-05-16 LAB
GLUCOSE BLD-MCNC: 110 MG/DL (ref 74–99)
GLUCOSE BLD-MCNC: 116 MG/DL (ref 74–99)
GLUCOSE BLD-MCNC: 164 MG/DL (ref 74–99)

## 2025-05-16 PROCEDURE — 6360000002 HC RX W HCPCS: Performed by: NURSE PRACTITIONER

## 2025-05-16 PROCEDURE — 04HK3DZ INSERTION OF INTRALUMINAL DEVICE INTO RIGHT FEMORAL ARTERY, PERCUTANEOUS APPROACH: ICD-10-PCS | Performed by: SURGERY

## 2025-05-16 PROCEDURE — 6370000000 HC RX 637 (ALT 250 FOR IP)

## 2025-05-16 PROCEDURE — 6360000004 HC RX CONTRAST MEDICATION: Performed by: SURGERY

## 2025-05-16 PROCEDURE — C1894 INTRO/SHEATH, NON-LASER: HCPCS | Performed by: SURGERY

## 2025-05-16 PROCEDURE — 75625 CONTRAST EXAM ABDOMINL AORTA: CPT | Performed by: SURGERY

## 2025-05-16 PROCEDURE — 6360000002 HC RX W HCPCS: Performed by: SURGERY

## 2025-05-16 PROCEDURE — C1769 GUIDE WIRE: HCPCS | Performed by: SURGERY

## 2025-05-16 PROCEDURE — 75774 ARTERY X-RAY EACH VESSEL: CPT | Performed by: SURGERY

## 2025-05-16 PROCEDURE — 75710 ARTERY X-RAYS ARM/LEG: CPT | Performed by: SURGERY

## 2025-05-16 PROCEDURE — 6370000000 HC RX 637 (ALT 250 FOR IP): Performed by: INTERNAL MEDICINE

## 2025-05-16 PROCEDURE — 2500000003 HC RX 250 WO HCPCS: Performed by: NURSE PRACTITIONER

## 2025-05-16 PROCEDURE — 2500000003 HC RX 250 WO HCPCS

## 2025-05-16 PROCEDURE — C1725 CATH, TRANSLUMIN NON-LASER: HCPCS | Performed by: SURGERY

## 2025-05-16 PROCEDURE — 51702 INSERT TEMP BLADDER CATH: CPT

## 2025-05-16 PROCEDURE — B41F0ZZ FLUOROSCOPY OF RIGHT LOWER EXTREMITY ARTERIES USING HIGH OSMOLAR CONTRAST: ICD-10-PCS | Performed by: SURGERY

## 2025-05-16 PROCEDURE — 6370000000 HC RX 637 (ALT 250 FOR IP): Performed by: NURSE PRACTITIONER

## 2025-05-16 PROCEDURE — C1760 CLOSURE DEV, VASC: HCPCS | Performed by: SURGERY

## 2025-05-16 PROCEDURE — C1887 CATHETER, GUIDING: HCPCS | Performed by: SURGERY

## 2025-05-16 PROCEDURE — 99231 SBSQ HOSP IP/OBS SF/LOW 25: CPT | Performed by: INTERNAL MEDICINE

## 2025-05-16 PROCEDURE — B41G0ZZ FLUOROSCOPY OF LEFT LOWER EXTREMITY ARTERIES USING HIGH OSMOLAR CONTRAST: ICD-10-PCS | Performed by: SURGERY

## 2025-05-16 PROCEDURE — 82962 GLUCOSE BLOOD TEST: CPT

## 2025-05-16 PROCEDURE — 2500000003 HC RX 250 WO HCPCS: Performed by: INTERNAL MEDICINE

## 2025-05-16 PROCEDURE — 2060000000 HC ICU INTERMEDIATE R&B

## 2025-05-16 PROCEDURE — 2709999900 HC NON-CHARGEABLE SUPPLY: Performed by: SURGERY

## 2025-05-16 DEVICE — ANGIO-SEAL VIP VASCULAR CLOSURE DEVICE
Type: IMPLANTABLE DEVICE | Status: FUNCTIONAL
Brand: ANGIO-SEAL

## 2025-05-16 RX ORDER — IOPAMIDOL 612 MG/ML
INJECTION, SOLUTION INTRAVASCULAR PRN
Status: DISCONTINUED | OUTPATIENT
Start: 2025-05-16 | End: 2025-05-16 | Stop reason: HOSPADM

## 2025-05-16 RX ORDER — HEPARIN SODIUM 1000 [USP'U]/ML
INJECTION, SOLUTION INTRAVENOUS; SUBCUTANEOUS PRN
Status: DISCONTINUED | OUTPATIENT
Start: 2025-05-16 | End: 2025-05-16 | Stop reason: HOSPADM

## 2025-05-16 RX ORDER — MIDAZOLAM HYDROCHLORIDE 1 MG/ML
INJECTION, SOLUTION INTRAMUSCULAR; INTRAVENOUS PRN
Status: DISCONTINUED | OUTPATIENT
Start: 2025-05-16 | End: 2025-05-16 | Stop reason: HOSPADM

## 2025-05-16 RX ORDER — FENTANYL CITRATE 50 UG/ML
INJECTION, SOLUTION INTRAMUSCULAR; INTRAVENOUS PRN
Status: DISCONTINUED | OUTPATIENT
Start: 2025-05-16 | End: 2025-05-16 | Stop reason: HOSPADM

## 2025-05-16 RX ADMIN — PANTOPRAZOLE SODIUM 40 MG: 40 TABLET, DELAYED RELEASE ORAL at 10:09

## 2025-05-16 RX ADMIN — METOPROLOL SUCCINATE 12.5 MG: 25 TABLET, FILM COATED, EXTENDED RELEASE ORAL at 10:09

## 2025-05-16 RX ADMIN — OXYCODONE AND ACETAMINOPHEN 1 TABLET: 5; 325 TABLET ORAL at 14:31

## 2025-05-16 RX ADMIN — SODIUM CHLORIDE, PRESERVATIVE FREE 10 ML: 5 INJECTION INTRAVENOUS at 20:01

## 2025-05-16 RX ADMIN — ATORVASTATIN CALCIUM 40 MG: 40 TABLET, FILM COATED ORAL at 20:01

## 2025-05-16 RX ADMIN — ASPIRIN 81 MG: 81 TABLET, COATED ORAL at 10:08

## 2025-05-16 RX ADMIN — Medication 500 MG: at 20:01

## 2025-05-16 RX ADMIN — SODIUM CHLORIDE, PRESERVATIVE FREE 10 ML: 5 INJECTION INTRAVENOUS at 10:21

## 2025-05-16 RX ADMIN — CLOPIDOGREL BISULFATE 75 MG: 75 TABLET, FILM COATED ORAL at 10:09

## 2025-05-16 RX ADMIN — GABAPENTIN 300 MG: 300 CAPSULE ORAL at 10:09

## 2025-05-16 RX ADMIN — OXYCODONE AND ACETAMINOPHEN 1 TABLET: 5; 325 TABLET ORAL at 10:16

## 2025-05-16 RX ADMIN — FAMOTIDINE 40 MG: 20 TABLET, FILM COATED ORAL at 10:09

## 2025-05-16 RX ADMIN — FOLIC ACID 1000 MCG: 1 TABLET ORAL at 10:09

## 2025-05-16 RX ADMIN — OXYCODONE AND ACETAMINOPHEN 1 TABLET: 5; 325 TABLET ORAL at 20:01

## 2025-05-16 RX ADMIN — INSULIN LISPRO 1 UNITS: 100 INJECTION, SOLUTION INTRAVENOUS; SUBCUTANEOUS at 20:13

## 2025-05-16 RX ADMIN — INSULIN GLARGINE 5 UNITS: 100 INJECTION, SOLUTION SUBCUTANEOUS at 20:12

## 2025-05-16 RX ADMIN — TAMSULOSIN HYDROCHLORIDE 0.4 MG: 0.4 CAPSULE ORAL at 20:01

## 2025-05-16 RX ADMIN — SODIUM CHLORIDE, PRESERVATIVE FREE 10 ML: 5 INJECTION INTRAVENOUS at 20:07

## 2025-05-16 RX ADMIN — OXYCODONE AND ACETAMINOPHEN 1 TABLET: 5; 325 TABLET ORAL at 05:34

## 2025-05-16 RX ADMIN — WATER 2000 MG: 1 INJECTION INTRAMUSCULAR; INTRAVENOUS; SUBCUTANEOUS at 12:46

## 2025-05-16 RX ADMIN — FLUCONAZOLE 400 MG: 150 TABLET ORAL at 10:10

## 2025-05-16 RX ADMIN — GABAPENTIN 300 MG: 300 CAPSULE ORAL at 20:01

## 2025-05-16 RX ADMIN — Medication 500 MG: at 10:09

## 2025-05-16 ASSESSMENT — PAIN SCALES - GENERAL
PAINLEVEL_OUTOF10: 8
PAINLEVEL_OUTOF10: 7
PAINLEVEL_OUTOF10: 7
PAINLEVEL_OUTOF10: 9
PAINLEVEL_OUTOF10: 6
PAINLEVEL_OUTOF10: 9
PAINLEVEL_OUTOF10: 5

## 2025-05-16 ASSESSMENT — PAIN DESCRIPTION - LOCATION
LOCATION: FOOT

## 2025-05-16 ASSESSMENT — PAIN DESCRIPTION - DESCRIPTORS
DESCRIPTORS: ACHING;DULL;SHARP
DESCRIPTORS: ACHING
DESCRIPTORS: ACHING;DISCOMFORT
DESCRIPTORS: ACHING;DULL;DISCOMFORT

## 2025-05-16 ASSESSMENT — PAIN DESCRIPTION - ORIENTATION
ORIENTATION: RIGHT

## 2025-05-16 ASSESSMENT — PAIN - FUNCTIONAL ASSESSMENT: PAIN_FUNCTIONAL_ASSESSMENT: ACTIVITIES ARE NOT PREVENTED

## 2025-05-16 NOTE — PLAN OF CARE
Problem: Chronic Conditions and Co-morbidities  Goal: Patient's chronic conditions and co-morbidity symptoms are monitored and maintained or improved  5/16/2025 1027 by Suze Silva RN  Outcome: Progressing  5/15/2025 2252 by Jamia Way RN  Outcome: Progressing     Problem: Discharge Planning  Goal: Discharge to home or other facility with appropriate resources  5/16/2025 1027 by Suze Silva RN  Outcome: Progressing  5/15/2025 2252 by Jamia Way RN  Outcome: Progressing     Problem: Safety - Adult  Goal: Free from fall injury  5/16/2025 1027 by Suze Silva RN  Outcome: Progressing  5/15/2025 2252 by Jamia Way RN  Outcome: Progressing     Problem: Nutrition Deficit:  Goal: Optimize nutritional status  5/16/2025 1027 by Suze Silva RN  Outcome: Progressing  5/15/2025 2252 by Jamia Way RN  Outcome: Progressing     Problem: ABCDS Injury Assessment  Goal: Absence of physical injury  5/16/2025 1027 by Suze Silva RN  Outcome: Progressing  5/15/2025 2252 by Jamia Way RN  Outcome: Progressing     Problem: Skin/Tissue Integrity  Goal: Skin integrity remains intact  Description: 1.  Monitor for areas of redness and/or skin breakdown2.  Assess vascular access sites hourly3.  Every 4-6 hours minimum:  Change oxygen saturation probe site4.  Every 4-6 hours:  If on nasal continuous positive airway pressure, respiratory therapy assess nares and determine need for appliance change or resting period  5/16/2025 1027 by Suze Silva RN  Outcome: Progressing  5/15/2025 2252 by Jamia Way RN  Outcome: Progressing     Problem: Pain  Goal: Verbalizes/displays adequate comfort level or baseline comfort level  5/16/2025 1027 by Suze Silva RN  Outcome: Progressing  5/15/2025 2252 by Jamia Way RN  Outcome: Progressing

## 2025-05-16 NOTE — DISCHARGE INSTR - COC
Continuity of Care Form    Patient Name: Jerome Steel   :  1960  MRN:  [id number]    Admit date:  2025  Discharge date:  ***    Code Status Order: Full Code   Advance Directives:    Date/Time Healthcare Directive Type of Healthcare Directive Copy in Chart Healthcare Agent Appointed Healthcare Agent's Name Healthcare Agent's Phone Number    25 1511 No, patient does not have an advance directive for healthcare treatment  --  --  --  --  --             Admitting Physician:  Aleyda Cheek DO  PCP: Genet Kraus DO    Discharging Nurse: ***  Discharging Hospital Unit/Room#: 7414/7414-A  Discharging Unit Phone Number: ***    Emergency Contact:   Extended Emergency Contact Information  Primary Emergency Contact: Yimi Steel  Home Phone: [phone]  Mobile Phone: [phone]  Relation: Brother/Sister   needed? No  Secondary Emergency Contact: miguelina julian  Home Phone: [phone]  Mobile Phone: [phone]  Relation: Other  Preferred language: English   needed? No    Past Surgical History:  Past Surgical History:   Procedure Laterality Date    CARDIAC PROCEDURE N/A 2025    Right heart cath performed by Krissy Liao MD at [facility] [place] [facility]    CARDIAC PROCEDURE N/A 2025    Left heart cath / coronary angiography performed by Terri Duarte MD at [facility] CARDIAC CATH/IR    CARPAL TUNNEL RELEASE  10/01/2016    Dr. Chang    CORONARY ARTERY BYPASS GRAFT N/A 2025    Pump Assist Coronary Artery Bypass Grafting performed by Baltazar Sterling MD at [facility] [place]    FINGER AMPUTATION      FOOT DEBRIDEMENT Left 2021    LEFT FOOT DEBRIDEMENT WITH BONE BIOPSY, WOUND VAC APPLICATION performed by Rober Harrell DPM at [facility] OR    FOOT DEBRIDEMENT Left 2022    LEFT FOOT DEBRIDEMENT INCISION AND DRAINAGE performed by Rober Harrell DPM at [facility] OR    FOOT DEBRIDEMENT Left 2022    LEFT FOOT PARTIAL CALCANECTOMY AND DEBRIDEMENT performed by Rober Harrell DPM at

## 2025-05-16 NOTE — PLAN OF CARE
Problem: Chronic Conditions and Co-morbidities  Goal: Patient's chronic conditions and co-morbidity symptoms are monitored and maintained or improved  5/15/2025 2252 by Jamia Way RN  Outcome: Progressing     Problem: Discharge Planning  Goal: Discharge to home or other facility with appropriate resources  5/15/2025 2252 by Jamia Way RN  Outcome: Progressing     Problem: Safety - Adult  Goal: Free from fall injury  5/15/2025 2252 by Jamia Way RN  Outcome: Progressing     Problem: ABCDS Injury Assessment  Goal: Absence of physical injury  5/15/2025 2252 by Jamia Way RN  Outcome: Progressing     Problem: Skin/Tissue Integrity  Goal: Skin integrity remains intact  Description: 1.  Monitor for areas of redness and/or skin breakdown2.  Assess vascular access sites hourly3.  Every 4-6 hours minimum:  Change oxygen saturation probe site4.  Every 4-6 hours:  If on nasal continuous positive airway pressure, respiratory therapy assess nares and determine need for appliance change or resting period  5/15/2025 2252 by Jamia Way RN  Outcome: Progressing     Problem: Pain  Goal: Verbalizes/displays adequate comfort level or baseline comfort level  5/15/2025 2252 by Jamia Way RN  Outcome: Progressing

## 2025-05-16 NOTE — CARE COORDINATION
Reviewed chart, [facility] states no criteria. Spoke with Joyce at [facility], they can accept pt back will need precert, pt to return skilled. PT 12/24 OT 12/24. Pt on IV rocephn q24 for 6 weeks per ID. Pt for angiogram today. ECHO completed 5/15. Envelope and ambulance form in soft chart. 3pm precert obtained good for 7 days, through 5/23. [facility] can take [facility] weekend if discharged. Awaiting vascular, possible BKA.Aretha Mireles, MSW, LSW

## 2025-05-17 PROBLEM — Z01.812 PRE-OPERATIVE LABORATORY EXAMINATION: Status: RESOLVED | Noted: 2025-04-17 | Resolved: 2025-05-17

## 2025-05-17 LAB
ANION GAP SERPL CALCULATED.3IONS-SCNC: 11 MMOL/L (ref 7–16)
BASOPHILS # BLD: 0.05 K/UL (ref 0–0.2)
BASOPHILS NFR BLD: 0 % (ref 0–2)
BUN SERPL-MCNC: 16 MG/DL (ref 8–23)
CALCIUM SERPL-MCNC: 9.3 MG/DL (ref 8.8–10.2)
CHLORIDE SERPL-SCNC: 104 MMOL/L (ref 98–107)
CO2 SERPL-SCNC: 22 MMOL/L (ref 22–29)
CREAT SERPL-MCNC: 0.7 MG/DL (ref 0.7–1.2)
EOSINOPHIL # BLD: 0.61 K/UL (ref 0.05–0.5)
EOSINOPHILS RELATIVE PERCENT: 5 % (ref 0–6)
ERYTHROCYTE [DISTWIDTH] IN BLOOD BY AUTOMATED COUNT: 16.1 % (ref 11.5–15)
GFR, ESTIMATED: >90 ML/MIN/1.73M2
GLUCOSE BLD-MCNC: 108 MG/DL (ref 74–99)
GLUCOSE BLD-MCNC: 167 MG/DL (ref 74–99)
GLUCOSE BLD-MCNC: 185 MG/DL (ref 74–99)
GLUCOSE BLD-MCNC: 99 MG/DL (ref 74–99)
GLUCOSE SERPL-MCNC: 95 MG/DL (ref 74–99)
HCT VFR BLD AUTO: 24.7 % (ref 37–54)
HGB BLD-MCNC: 7.7 G/DL (ref 12.5–16.5)
IMM GRANULOCYTES # BLD AUTO: 0.13 K/UL (ref 0–0.58)
IMM GRANULOCYTES NFR BLD: 1 % (ref 0–5)
LYMPHOCYTES NFR BLD: 1.87 K/UL (ref 1.5–4)
LYMPHOCYTES RELATIVE PERCENT: 16 % (ref 20–42)
MCH RBC QN AUTO: 27.4 PG (ref 26–35)
MCHC RBC AUTO-ENTMCNC: 31.2 G/DL (ref 32–34.5)
MCV RBC AUTO: 87.9 FL (ref 80–99.9)
MICROORGANISM SPEC CULT: NORMAL
MONOCYTES NFR BLD: 0.73 K/UL (ref 0.1–0.95)
MONOCYTES NFR BLD: 6 % (ref 2–12)
NEUTROPHILS NFR BLD: 71 % (ref 43–80)
NEUTS SEG NFR BLD: 8.19 K/UL (ref 1.8–7.3)
PLATELET # BLD AUTO: 355 K/UL (ref 130–450)
PMV BLD AUTO: 9.6 FL (ref 7–12)
POTASSIUM SERPL-SCNC: 4.6 MMOL/L (ref 3.5–5.1)
RBC # BLD AUTO: 2.81 M/UL (ref 3.8–5.8)
SERVICE CMNT-IMP: NORMAL
SODIUM SERPL-SCNC: 137 MMOL/L (ref 136–145)
SPECIMEN DESCRIPTION: NORMAL
WBC OTHER # BLD: 11.6 K/UL (ref 4.5–11.5)

## 2025-05-17 PROCEDURE — 82962 GLUCOSE BLOOD TEST: CPT

## 2025-05-17 PROCEDURE — 80048 BASIC METABOLIC PNL TOTAL CA: CPT

## 2025-05-17 PROCEDURE — 6360000002 HC RX W HCPCS

## 2025-05-17 PROCEDURE — 2500000003 HC RX 250 WO HCPCS

## 2025-05-17 PROCEDURE — 85025 COMPLETE CBC W/AUTO DIFF WBC: CPT

## 2025-05-17 PROCEDURE — 99231 SBSQ HOSP IP/OBS SF/LOW 25: CPT | Performed by: INTERNAL MEDICINE

## 2025-05-17 PROCEDURE — 6370000000 HC RX 637 (ALT 250 FOR IP)

## 2025-05-17 PROCEDURE — 36415 COLL VENOUS BLD VENIPUNCTURE: CPT

## 2025-05-17 PROCEDURE — 2060000000 HC ICU INTERMEDIATE R&B

## 2025-05-17 PROCEDURE — 51702 INSERT TEMP BLADDER CATH: CPT

## 2025-05-17 RX ADMIN — OXYCODONE AND ACETAMINOPHEN 1 TABLET: 5; 325 TABLET ORAL at 00:15

## 2025-05-17 RX ADMIN — Medication 1 DROP: at 08:27

## 2025-05-17 RX ADMIN — SODIUM CHLORIDE, PRESERVATIVE FREE 10 ML: 5 INJECTION INTRAVENOUS at 11:47

## 2025-05-17 RX ADMIN — SODIUM CHLORIDE, PRESERVATIVE FREE 10 ML: 5 INJECTION INTRAVENOUS at 08:26

## 2025-05-17 RX ADMIN — INSULIN GLARGINE 5 UNITS: 100 INJECTION, SOLUTION SUBCUTANEOUS at 20:42

## 2025-05-17 RX ADMIN — ASPIRIN 81 MG: 81 TABLET, COATED ORAL at 08:33

## 2025-05-17 RX ADMIN — PANTOPRAZOLE SODIUM 40 MG: 40 TABLET, DELAYED RELEASE ORAL at 08:26

## 2025-05-17 RX ADMIN — OXYCODONE AND ACETAMINOPHEN 1 TABLET: 5; 325 TABLET ORAL at 04:19

## 2025-05-17 RX ADMIN — WATER 2000 MG: 1 INJECTION INTRAMUSCULAR; INTRAVENOUS; SUBCUTANEOUS at 11:46

## 2025-05-17 RX ADMIN — TAMSULOSIN HYDROCHLORIDE 0.4 MG: 0.4 CAPSULE ORAL at 20:42

## 2025-05-17 RX ADMIN — SODIUM CHLORIDE, PRESERVATIVE FREE 10 ML: 5 INJECTION INTRAVENOUS at 08:33

## 2025-05-17 RX ADMIN — ENOXAPARIN SODIUM 40 MG: 100 INJECTION SUBCUTANEOUS at 08:23

## 2025-05-17 RX ADMIN — OXYCODONE AND ACETAMINOPHEN 1 TABLET: 5; 325 TABLET ORAL at 12:33

## 2025-05-17 RX ADMIN — OXYCODONE AND ACETAMINOPHEN 1 TABLET: 5; 325 TABLET ORAL at 16:30

## 2025-05-17 RX ADMIN — OXYCODONE AND ACETAMINOPHEN 1 TABLET: 5; 325 TABLET ORAL at 20:42

## 2025-05-17 RX ADMIN — ATORVASTATIN CALCIUM 40 MG: 40 TABLET, FILM COATED ORAL at 20:42

## 2025-05-17 RX ADMIN — FLUCONAZOLE 400 MG: 150 TABLET ORAL at 08:25

## 2025-05-17 RX ADMIN — OXYCODONE AND ACETAMINOPHEN 1 TABLET: 5; 325 TABLET ORAL at 08:24

## 2025-05-17 RX ADMIN — FOLIC ACID 1000 MCG: 1 TABLET ORAL at 08:23

## 2025-05-17 RX ADMIN — Medication 500 MG: at 08:33

## 2025-05-17 RX ADMIN — CLOPIDOGREL BISULFATE 75 MG: 75 TABLET, FILM COATED ORAL at 08:26

## 2025-05-17 RX ADMIN — GABAPENTIN 300 MG: 300 CAPSULE ORAL at 20:42

## 2025-05-17 RX ADMIN — Medication 500 MG: at 20:42

## 2025-05-17 RX ADMIN — GABAPENTIN 300 MG: 300 CAPSULE ORAL at 08:23

## 2025-05-17 RX ADMIN — FAMOTIDINE 40 MG: 20 TABLET, FILM COATED ORAL at 08:23

## 2025-05-17 RX ADMIN — METOPROLOL SUCCINATE 12.5 MG: 25 TABLET, FILM COATED, EXTENDED RELEASE ORAL at 08:23

## 2025-05-17 RX ADMIN — INSULIN LISPRO 1 UNITS: 100 INJECTION, SOLUTION INTRAVENOUS; SUBCUTANEOUS at 16:34

## 2025-05-17 ASSESSMENT — PAIN SCALES - GENERAL
PAINLEVEL_OUTOF10: 0
PAINLEVEL_OUTOF10: 8
PAINLEVEL_OUTOF10: 9
PAINLEVEL_OUTOF10: 8
PAINLEVEL_OUTOF10: 7
PAINLEVEL_OUTOF10: 0
PAINLEVEL_OUTOF10: 5
PAINLEVEL_OUTOF10: 5
PAINLEVEL_OUTOF10: 7
PAINLEVEL_OUTOF10: 5
PAINLEVEL_OUTOF10: 8
PAINLEVEL_OUTOF10: 5
PAINLEVEL_OUTOF10: 7
PAINLEVEL_OUTOF10: 7
PAINLEVEL_OUTOF10: 8

## 2025-05-17 ASSESSMENT — PAIN DESCRIPTION - LOCATION
LOCATION: FOOT;BACK
LOCATION: LEG
LOCATION: BACK;FOOT
LOCATION: FOOT
LOCATION: HIP;LEG;FOOT
LOCATION: FOOT;BACK

## 2025-05-17 ASSESSMENT — PAIN DESCRIPTION - FREQUENCY
FREQUENCY: CONTINUOUS

## 2025-05-17 ASSESSMENT — PAIN DESCRIPTION - DESCRIPTORS
DESCRIPTORS: ACHING;DISCOMFORT;DULL
DESCRIPTORS: ACHING;DISCOMFORT;DULL
DESCRIPTORS: ACHING;SHARP;SHOOTING
DESCRIPTORS: SHARP
DESCRIPTORS: ACHING;SHARP;SORE

## 2025-05-17 ASSESSMENT — PAIN DESCRIPTION - PAIN TYPE
TYPE: CHRONIC PAIN;SURGICAL PAIN
TYPE: CHRONIC PAIN;SURGICAL PAIN
TYPE: CHRONIC PAIN

## 2025-05-17 ASSESSMENT — PAIN DESCRIPTION - ONSET
ONSET: ON-GOING

## 2025-05-17 ASSESSMENT — PAIN DESCRIPTION - ORIENTATION
ORIENTATION: RIGHT
ORIENTATION: RIGHT
ORIENTATION: RIGHT;MID
ORIENTATION: RIGHT;LOWER
ORIENTATION: RIGHT;MID

## 2025-05-17 ASSESSMENT — PAIN SCALES - WONG BAKER
WONGBAKER_NUMERICALRESPONSE: HURTS WHOLE LOT
WONGBAKER_NUMERICALRESPONSE: NO HURT
WONGBAKER_NUMERICALRESPONSE: HURTS WHOLE LOT
WONGBAKER_NUMERICALRESPONSE: HURTS WHOLE LOT
WONGBAKER_NUMERICALRESPONSE: NO HURT
WONGBAKER_NUMERICALRESPONSE: HURTS EVEN MORE

## 2025-05-17 NOTE — PLAN OF CARE
Problem: Chronic Conditions and Co-morbidities  Goal: Patient's chronic conditions and co-morbidity symptoms are monitored and maintained or improved  5/16/2025 2259 by Jamia Way RN  Outcome: Progressing     Problem: Discharge Planning  Goal: Discharge to home or other facility with appropriate resources  5/16/2025 2259 by Jamia Way RN  Outcome: Progressing     Problem: Safety - Adult  Goal: Free from fall injury  5/16/2025 2259 by Jamia Way RN  Outcome: Progressing     Problem: Nutrition Deficit:  Goal: Optimize nutritional status  5/16/2025 2259 by Jamia Way RN  Outcome: Progressing     Problem: ABCDS Injury Assessment  Goal: Absence of physical injury  5/16/2025 2259 by Jamia Way RN  Outcome: Progressing     Problem: Skin/Tissue Integrity  Goal: Skin integrity remains intact  Description: 1.  Monitor for areas of redness and/or skin breakdown2.  Assess vascular access sites hourly3.  Every 4-6 hours minimum:  Change oxygen saturation probe site4.  Every 4-6 hours:  If on nasal continuous positive airway pressure, respiratory therapy assess nares and determine need for appliance change or resting period  5/16/2025 2259 by Jamia Way RN  Outcome: Progressing     Problem: Pain  Goal: Verbalizes/displays adequate comfort level or baseline comfort level  5/16/2025 2259 by Jamia Way RN  Outcome: Progressing

## 2025-05-17 NOTE — PLAN OF CARE
Problem: Chronic Conditions and Co-morbidities  Goal: Patient's chronic conditions and co-morbidity symptoms are monitored and maintained or improved  Outcome: Progressing     Problem: Discharge Planning  Goal: Discharge to home or other facility with appropriate resources  Outcome: Progressing     Problem: Safety - Adult  Goal: Free from fall injury  Outcome: Progressing     Problem: Nutrition Deficit:  Goal: Optimize nutritional status  Outcome: Progressing     Problem: ABCDS Injury Assessment  Goal: Absence of physical injury  Outcome: Progressing     Problem: Skin/Tissue Integrity  Goal: Skin integrity remains intact  Description: 1.  Monitor for areas of redness and/or skin breakdown2.  Assess vascular access sites hourly3.  Every 4-6 hours minimum:  Change oxygen saturation probe site4.  Every 4-6 hours:  If on nasal continuous positive airway pressure, respiratory therapy assess nares and determine need for appliance change or resting period  Outcome: Progressing  Flowsheets (Taken 5/17/2025 5420)  Skin Integrity Remains Intact: Monitor for areas of redness and/or skin breakdown     Problem: Pain  Goal: Verbalizes/displays adequate comfort level or baseline comfort level  Outcome: Progressing

## 2025-05-18 LAB
ANION GAP SERPL CALCULATED.3IONS-SCNC: 12 MMOL/L (ref 7–16)
BASOPHILS # BLD: 0.07 K/UL (ref 0–0.2)
BASOPHILS NFR BLD: 1 % (ref 0–2)
BUN SERPL-MCNC: 15 MG/DL (ref 8–23)
CALCIUM SERPL-MCNC: 9.1 MG/DL (ref 8.8–10.2)
CHLORIDE SERPL-SCNC: 103 MMOL/L (ref 98–107)
CO2 SERPL-SCNC: 21 MMOL/L (ref 22–29)
CREAT SERPL-MCNC: 0.7 MG/DL (ref 0.7–1.2)
EOSINOPHIL # BLD: 0.5 K/UL (ref 0.05–0.5)
EOSINOPHILS RELATIVE PERCENT: 6 % (ref 0–6)
ERYTHROCYTE [DISTWIDTH] IN BLOOD BY AUTOMATED COUNT: 16 % (ref 11.5–15)
GFR, ESTIMATED: >90 ML/MIN/1.73M2
GLUCOSE BLD-MCNC: 103 MG/DL (ref 74–99)
GLUCOSE BLD-MCNC: 140 MG/DL (ref 74–99)
GLUCOSE BLD-MCNC: 160 MG/DL (ref 74–99)
GLUCOSE BLD-MCNC: 163 MG/DL (ref 74–99)
GLUCOSE SERPL-MCNC: 100 MG/DL (ref 74–99)
HCT VFR BLD AUTO: 25.8 % (ref 37–54)
HGB BLD-MCNC: 8.2 G/DL (ref 12.5–16.5)
IMM GRANULOCYTES # BLD AUTO: 0.15 K/UL (ref 0–0.58)
IMM GRANULOCYTES NFR BLD: 2 % (ref 0–5)
LYMPHOCYTES NFR BLD: 1.93 K/UL (ref 1.5–4)
LYMPHOCYTES RELATIVE PERCENT: 22 % (ref 20–42)
MCH RBC QN AUTO: 27.9 PG (ref 26–35)
MCHC RBC AUTO-ENTMCNC: 31.8 G/DL (ref 32–34.5)
MCV RBC AUTO: 87.8 FL (ref 80–99.9)
MICROORGANISM SPEC CULT: ABNORMAL
MICROORGANISM SPEC CULT: NORMAL
MICROORGANISM/AGENT SPEC: ABNORMAL
MONOCYTES NFR BLD: 0.58 K/UL (ref 0.1–0.95)
MONOCYTES NFR BLD: 7 % (ref 2–12)
NEUTROPHILS NFR BLD: 64 % (ref 43–80)
NEUTS SEG NFR BLD: 5.76 K/UL (ref 1.8–7.3)
PLATELET # BLD AUTO: 352 K/UL (ref 130–450)
PMV BLD AUTO: 9.5 FL (ref 7–12)
POTASSIUM SERPL-SCNC: 4 MMOL/L (ref 3.5–5.1)
RBC # BLD AUTO: 2.94 M/UL (ref 3.8–5.8)
SERVICE CMNT-IMP: ABNORMAL
SERVICE CMNT-IMP: NORMAL
SODIUM SERPL-SCNC: 136 MMOL/L (ref 136–145)
SPECIMEN DESCRIPTION: ABNORMAL
SPECIMEN DESCRIPTION: NORMAL
WBC OTHER # BLD: 9 K/UL (ref 4.5–11.5)

## 2025-05-18 PROCEDURE — 2500000003 HC RX 250 WO HCPCS

## 2025-05-18 PROCEDURE — 82962 GLUCOSE BLOOD TEST: CPT

## 2025-05-18 PROCEDURE — 6370000000 HC RX 637 (ALT 250 FOR IP)

## 2025-05-18 PROCEDURE — 99231 SBSQ HOSP IP/OBS SF/LOW 25: CPT | Performed by: INTERNAL MEDICINE

## 2025-05-18 PROCEDURE — 2060000000 HC ICU INTERMEDIATE R&B

## 2025-05-18 PROCEDURE — 6360000002 HC RX W HCPCS

## 2025-05-18 PROCEDURE — 87081 CULTURE SCREEN ONLY: CPT

## 2025-05-18 PROCEDURE — 80048 BASIC METABOLIC PNL TOTAL CA: CPT

## 2025-05-18 PROCEDURE — 85025 COMPLETE CBC W/AUTO DIFF WBC: CPT

## 2025-05-18 PROCEDURE — 36415 COLL VENOUS BLD VENIPUNCTURE: CPT

## 2025-05-18 RX ADMIN — INSULIN LISPRO 1 UNITS: 100 INJECTION, SOLUTION INTRAVENOUS; SUBCUTANEOUS at 00:23

## 2025-05-18 RX ADMIN — Medication 500 MG: at 21:08

## 2025-05-18 RX ADMIN — OXYCODONE AND ACETAMINOPHEN 1 TABLET: 5; 325 TABLET ORAL at 21:07

## 2025-05-18 RX ADMIN — FOLIC ACID 1000 MCG: 1 TABLET ORAL at 08:51

## 2025-05-18 RX ADMIN — OXYCODONE AND ACETAMINOPHEN 1 TABLET: 5; 325 TABLET ORAL at 00:37

## 2025-05-18 RX ADMIN — Medication 1 DROP: at 12:58

## 2025-05-18 RX ADMIN — ASPIRIN 81 MG: 81 TABLET, COATED ORAL at 08:51

## 2025-05-18 RX ADMIN — ATORVASTATIN CALCIUM 40 MG: 40 TABLET, FILM COATED ORAL at 21:07

## 2025-05-18 RX ADMIN — INSULIN GLARGINE 5 UNITS: 100 INJECTION, SOLUTION SUBCUTANEOUS at 21:07

## 2025-05-18 RX ADMIN — CLOPIDOGREL BISULFATE 75 MG: 75 TABLET, FILM COATED ORAL at 08:51

## 2025-05-18 RX ADMIN — SODIUM CHLORIDE, PRESERVATIVE FREE 10 ML: 5 INJECTION INTRAVENOUS at 21:08

## 2025-05-18 RX ADMIN — FLUCONAZOLE 400 MG: 150 TABLET ORAL at 08:51

## 2025-05-18 RX ADMIN — FAMOTIDINE 40 MG: 20 TABLET, FILM COATED ORAL at 08:51

## 2025-05-18 RX ADMIN — SODIUM CHLORIDE, PRESERVATIVE FREE 10 ML: 5 INJECTION INTRAVENOUS at 08:53

## 2025-05-18 RX ADMIN — WATER 2000 MG: 1 INJECTION INTRAMUSCULAR; INTRAVENOUS; SUBCUTANEOUS at 12:58

## 2025-05-18 RX ADMIN — PANTOPRAZOLE SODIUM 40 MG: 40 TABLET, DELAYED RELEASE ORAL at 08:51

## 2025-05-18 RX ADMIN — INSULIN LISPRO 1 UNITS: 100 INJECTION, SOLUTION INTRAVENOUS; SUBCUTANEOUS at 21:07

## 2025-05-18 RX ADMIN — OXYCODONE AND ACETAMINOPHEN 1 TABLET: 5; 325 TABLET ORAL at 08:52

## 2025-05-18 RX ADMIN — ENOXAPARIN SODIUM 40 MG: 100 INJECTION SUBCUTANEOUS at 08:52

## 2025-05-18 RX ADMIN — OXYCODONE AND ACETAMINOPHEN 1 TABLET: 5; 325 TABLET ORAL at 17:05

## 2025-05-18 RX ADMIN — SODIUM CHLORIDE, PRESERVATIVE FREE 10 ML: 5 INJECTION INTRAVENOUS at 00:23

## 2025-05-18 RX ADMIN — GABAPENTIN 300 MG: 300 CAPSULE ORAL at 08:51

## 2025-05-18 RX ADMIN — OXYCODONE AND ACETAMINOPHEN 1 TABLET: 5; 325 TABLET ORAL at 12:57

## 2025-05-18 RX ADMIN — Medication 500 MG: at 08:52

## 2025-05-18 RX ADMIN — OXYCODONE AND ACETAMINOPHEN 1 TABLET: 5; 325 TABLET ORAL at 04:46

## 2025-05-18 RX ADMIN — TAMSULOSIN HYDROCHLORIDE 0.4 MG: 0.4 CAPSULE ORAL at 21:07

## 2025-05-18 RX ADMIN — GABAPENTIN 300 MG: 300 CAPSULE ORAL at 21:08

## 2025-05-18 RX ADMIN — METOPROLOL SUCCINATE 12.5 MG: 25 TABLET, FILM COATED, EXTENDED RELEASE ORAL at 08:52

## 2025-05-18 ASSESSMENT — PAIN DESCRIPTION - LOCATION
LOCATION: GENERALIZED
LOCATION: BACK;HIP;FOOT
LOCATION: FOOT;BACK
LOCATION: FOOT;HIP;LEG
LOCATION: BACK;FOOT;HIP
LOCATION: FOOT
LOCATION: GENERALIZED

## 2025-05-18 ASSESSMENT — PAIN SCALES - GENERAL
PAINLEVEL_OUTOF10: 8
PAINLEVEL_OUTOF10: 5
PAINLEVEL_OUTOF10: 3
PAINLEVEL_OUTOF10: 8
PAINLEVEL_OUTOF10: 0
PAINLEVEL_OUTOF10: 8
PAINLEVEL_OUTOF10: 0
PAINLEVEL_OUTOF10: 6
PAINLEVEL_OUTOF10: 8
PAINLEVEL_OUTOF10: 7

## 2025-05-18 ASSESSMENT — PAIN DESCRIPTION - DESCRIPTORS
DESCRIPTORS: DULL;ACHING;DISCOMFORT
DESCRIPTORS: SORE;SHOOTING;ACHING

## 2025-05-18 ASSESSMENT — PAIN DESCRIPTION - ORIENTATION: ORIENTATION: RIGHT

## 2025-05-18 ASSESSMENT — PAIN SCALES - WONG BAKER
WONGBAKER_NUMERICALRESPONSE: NO HURT
WONGBAKER_NUMERICALRESPONSE: NO HURT

## 2025-05-18 NOTE — PLAN OF CARE
Problem: Chronic Conditions and Co-morbidities  Goal: Patient's chronic conditions and co-morbidity symptoms are monitored and maintained or improved  5/18/2025 0633 by Judy Hammond RN  Outcome: Progressing  5/18/2025 0014 by Judy Hammond RN  Outcome: Progressing  5/17/2025 1755 by Nasrin Albert RN  Outcome: Progressing     Problem: Discharge Planning  Goal: Discharge to home or other facility with appropriate resources  5/18/2025 0633 by Judy Hammond RN  Outcome: Progressing  5/18/2025 0014 by Judy Hammond RN  Outcome: Progressing  5/17/2025 1755 by Nasrin Albert RN  Outcome: Progressing     Problem: Safety - Adult  Goal: Free from fall injury  5/18/2025 0633 by Judy Hammond RN  Outcome: Progressing  5/18/2025 0014 by Judy Hammond RN  Outcome: Progressing  5/17/2025 1755 by Nasrin Albert RN  Outcome: Progressing     Problem: Nutrition Deficit:  Goal: Optimize nutritional status  5/18/2025 0633 by Judy Hammond RN  Outcome: Progressing  5/18/2025 0014 by Judy Hammond RN  Outcome: Progressing  5/17/2025 1755 by Nasrin Albert RN  Outcome: Progressing     Problem: ABCDS Injury Assessment  Goal: Absence of physical injury  5/18/2025 0633 by Judy Hammond RN  Outcome: Progressing  5/18/2025 0014 by Judy Hammond RN  Outcome: Progressing  5/17/2025 1755 by Nasrin Albert RN  Outcome: Progressing     Problem: Skin/Tissue Integrity  Goal: Skin integrity remains intact  Description: 1.  Monitor for areas of redness and/or skin breakdown2.  Assess vascular access sites hourly3.  Every 4-6 hours minimum:  Change oxygen saturation probe site4.  Every 4-6 hours:  If on nasal continuous positive airway pressure, respiratory therapy assess nares and determine need for appliance change or resting period  5/18/2025 0633 by Judy Hammond RN  Outcome: Progressing  5/18/2025 0014 by Judy Hammond RN  Outcome: Progressing  Flowsheets (Taken 5/17/2025 1800 by Nasrin Albert,

## 2025-05-18 NOTE — PLAN OF CARE
Problem: Chronic Conditions and Co-morbidities  Goal: Patient's chronic conditions and co-morbidity symptoms are monitored and maintained or improved  5/18/2025 1157 by Janette Orr RN  Outcome: Progressing  5/18/2025 1156 by Janette Orr RN  Outcome: Progressing  5/18/2025 0633 by Judy Hammond RN  Outcome: Progressing  5/18/2025 0014 by Judy Hammond RN  Outcome: Progressing     Problem: Discharge Planning  Goal: Discharge to home or other facility with appropriate resources  5/18/2025 1157 by Janette Orr RN  Outcome: Progressing  5/18/2025 1156 by Janette Orr RN  Outcome: Progressing  5/18/2025 0633 by Judy Hammond RN  Outcome: Progressing  5/18/2025 0014 by Judy Hammond RN  Outcome: Progressing     Problem: Safety - Adult  Goal: Free from fall injury  5/18/2025 1157 by Janette Orr RN  Outcome: Progressing  5/18/2025 1156 by Janette Orr RN  Outcome: Progressing  5/18/2025 0633 by Judy Hammond RN  Outcome: Progressing  5/18/2025 0014 by Judy Hammond RN  Outcome: Progressing     Problem: Nutrition Deficit:  Goal: Optimize nutritional status  5/18/2025 1157 by Janette Orr RN  Outcome: Progressing  5/18/2025 1156 by Janette Orr RN  Outcome: Progressing  5/18/2025 0633 by Judy Hammond RN  Outcome: Progressing  5/18/2025 0014 by Judy Hammond RN  Outcome: Progressing     Problem: ABCDS Injury Assessment  Goal: Absence of physical injury  5/18/2025 1157 by Janette Orr RN  Outcome: Progressing  5/18/2025 1156 by Janette Orr RN  Outcome: Progressing  5/18/2025 0633 by Judy Hammond RN  Outcome: Progressing  5/18/2025 0014 by Judy Hammond RN  Outcome: Progressing     Problem: Skin/Tissue Integrity  Goal: Skin integrity remains intact  Description: 1.  Monitor for areas of redness and/or skin breakdown2.  Assess vascular access sites hourly3.  Every 4-6 hours minimum:  Change oxygen saturation probe site4.  Every 4-6 hours:  If on nasal

## 2025-05-18 NOTE — PLAN OF CARE
Problem: Chronic Conditions and Co-morbidities  Goal: Patient's chronic conditions and co-morbidity symptoms are monitored and maintained or improved  5/18/2025 0014 by Judy Hammond RN  Outcome: Progressing  5/17/2025 1755 by Nasrin Albert RN  Outcome: Progressing     Problem: Discharge Planning  Goal: Discharge to home or other facility with appropriate resources  5/18/2025 0014 by Judy Hammond RN  Outcome: Progressing  5/17/2025 1755 by Nasrin Albert RN  Outcome: Progressing     Problem: Safety - Adult  Goal: Free from fall injury  5/18/2025 0014 by Judy Hammond RN  Outcome: Progressing  5/17/2025 1755 by Nasrin Albert RN  Outcome: Progressing     Problem: Nutrition Deficit:  Goal: Optimize nutritional status  5/18/2025 0014 by Judy Hammond RN  Outcome: Progressing  5/17/2025 1755 by Nasrin Albert RN  Outcome: Progressing     Problem: ABCDS Injury Assessment  Goal: Absence of physical injury  5/18/2025 0014 by Judy Hammond RN  Outcome: Progressing  5/17/2025 1755 by Nasrin Albert RN  Outcome: Progressing     Problem: Skin/Tissue Integrity  Goal: Skin integrity remains intact  Description: 1.  Monitor for areas of redness and/or skin breakdown2.  Assess vascular access sites hourly3.  Every 4-6 hours minimum:  Change oxygen saturation probe site4.  Every 4-6 hours:  If on nasal continuous positive airway pressure, respiratory therapy assess nares and determine need for appliance change or resting period  5/18/2025 0014 by Judy Hammond RN  Outcome: Progressing  Flowsheets (Taken 5/17/2025 1800 by Nasrin Albert, RN)  Skin Integrity Remains Intact: Monitor for areas of redness and/or skin breakdown  5/17/2025 1755 by Nasrin Albert RN  Outcome: Progressing  Flowsheets (Taken 5/17/2025 1754)  Skin Integrity Remains Intact: Monitor for areas of redness and/or skin breakdown     Problem: Pain  Goal: Verbalizes/displays adequate comfort level or baseline comfort level  5/18/2025 0014

## 2025-05-19 ENCOUNTER — PREP FOR PROCEDURE (OUTPATIENT)
Dept: VASCULAR SURGERY | Age: 65
End: 2025-05-19

## 2025-05-19 ENCOUNTER — ANESTHESIA EVENT (OUTPATIENT)
Age: 65
DRG: 616 | End: 2025-05-19
Payer: COMMERCIAL

## 2025-05-19 ENCOUNTER — APPOINTMENT (OUTPATIENT)
Age: 65
DRG: 616 | End: 2025-05-19
Payer: COMMERCIAL

## 2025-05-19 ENCOUNTER — ANESTHESIA (OUTPATIENT)
Age: 65
DRG: 616 | End: 2025-05-19
Payer: COMMERCIAL

## 2025-05-19 LAB
ANION GAP SERPL CALCULATED.3IONS-SCNC: 13 MMOL/L (ref 7–16)
BASOPHILS # BLD: 0.06 K/UL (ref 0–0.2)
BASOPHILS NFR BLD: 0 % (ref 0–2)
BUN SERPL-MCNC: 14 MG/DL (ref 8–23)
CALCIUM SERPL-MCNC: 9 MG/DL (ref 8.8–10.2)
CHLORIDE SERPL-SCNC: 102 MMOL/L (ref 98–107)
CO2 SERPL-SCNC: 20 MMOL/L (ref 22–29)
CREAT SERPL-MCNC: 0.6 MG/DL (ref 0.7–1.2)
ECHO AO SINUS VALSALVA DIAM: 4.4 CM
ECHO AO SINUS VALSALVA INDEX: 2.32 CM/M2
ECHO BSA: 1.94 M2
ECHO EST RA PRESSURE: 3 MMHG
ECHO LV EF PHYSICIAN: 62 %
EOSINOPHIL # BLD: 0.44 K/UL (ref 0.05–0.5)
EOSINOPHILS RELATIVE PERCENT: 3 % (ref 0–6)
ERYTHROCYTE [DISTWIDTH] IN BLOOD BY AUTOMATED COUNT: 16.3 % (ref 11.5–15)
GFR, ESTIMATED: >90 ML/MIN/1.73M2
GLUCOSE BLD-MCNC: 101 MG/DL (ref 74–99)
GLUCOSE BLD-MCNC: 140 MG/DL (ref 74–99)
GLUCOSE BLD-MCNC: 93 MG/DL (ref 74–99)
GLUCOSE SERPL-MCNC: 107 MG/DL (ref 74–99)
HCT VFR BLD AUTO: 29.6 % (ref 37–54)
HGB BLD-MCNC: 8.8 G/DL (ref 12.5–16.5)
IMM GRANULOCYTES # BLD AUTO: 0.14 K/UL (ref 0–0.58)
IMM GRANULOCYTES NFR BLD: 1 % (ref 0–5)
LYMPHOCYTES NFR BLD: 1.67 K/UL (ref 1.5–4)
LYMPHOCYTES RELATIVE PERCENT: 11 % (ref 20–42)
MCH RBC QN AUTO: 27.8 PG (ref 26–35)
MCHC RBC AUTO-ENTMCNC: 29.7 G/DL (ref 32–34.5)
MCV RBC AUTO: 93.4 FL (ref 80–99.9)
MICROORGANISM SPEC CULT: ABNORMAL
MICROORGANISM SPEC CULT: NORMAL
MICROORGANISM/AGENT SPEC: ABNORMAL
MONOCYTES NFR BLD: 1.06 K/UL (ref 0.1–0.95)
MONOCYTES NFR BLD: 7 % (ref 2–12)
NEUTROPHILS NFR BLD: 78 % (ref 43–80)
NEUTS SEG NFR BLD: 12.15 K/UL (ref 1.8–7.3)
PLATELET # BLD AUTO: 348 K/UL (ref 130–450)
PMV BLD AUTO: 10.2 FL (ref 7–12)
POTASSIUM SERPL-SCNC: 4.2 MMOL/L (ref 3.5–5.1)
RBC # BLD AUTO: 3.17 M/UL (ref 3.8–5.8)
SERVICE CMNT-IMP: ABNORMAL
SODIUM SERPL-SCNC: 135 MMOL/L (ref 136–145)
SPECIMEN DESCRIPTION: ABNORMAL
SPECIMEN DESCRIPTION: NORMAL
WBC OTHER # BLD: 15.5 K/UL (ref 4.5–11.5)

## 2025-05-19 PROCEDURE — 7100000010 HC PHASE II RECOVERY - FIRST 15 MIN: Performed by: INTERNAL MEDICINE

## 2025-05-19 PROCEDURE — 2580000003 HC RX 258: Performed by: NURSE PRACTITIONER

## 2025-05-19 PROCEDURE — 93325 DOPPLER ECHO COLOR FLOW MAPG: CPT | Performed by: INTERNAL MEDICINE

## 2025-05-19 PROCEDURE — 51702 INSERT TEMP BLADDER CATH: CPT

## 2025-05-19 PROCEDURE — 3700000001 HC ADD 15 MINUTES (ANESTHESIA): Performed by: INTERNAL MEDICINE

## 2025-05-19 PROCEDURE — 80048 BASIC METABOLIC PNL TOTAL CA: CPT

## 2025-05-19 PROCEDURE — 82962 GLUCOSE BLOOD TEST: CPT

## 2025-05-19 PROCEDURE — 86900 BLOOD TYPING SEROLOGIC ABO: CPT

## 2025-05-19 PROCEDURE — 93320 DOPPLER ECHO COMPLETE: CPT | Performed by: INTERNAL MEDICINE

## 2025-05-19 PROCEDURE — 6370000000 HC RX 637 (ALT 250 FOR IP)

## 2025-05-19 PROCEDURE — 3700000000 HC ANESTHESIA ATTENDED CARE: Performed by: INTERNAL MEDICINE

## 2025-05-19 PROCEDURE — 85025 COMPLETE CBC W/AUTO DIFF WBC: CPT

## 2025-05-19 PROCEDURE — 7100000011 HC PHASE II RECOVERY - ADDTL 15 MIN: Performed by: INTERNAL MEDICINE

## 2025-05-19 PROCEDURE — 6360000002 HC RX W HCPCS

## 2025-05-19 PROCEDURE — 6360000002 HC RX W HCPCS: Performed by: NURSE PRACTITIONER

## 2025-05-19 PROCEDURE — 86923 COMPATIBILITY TEST ELECTRIC: CPT

## 2025-05-19 PROCEDURE — 36415 COLL VENOUS BLD VENIPUNCTURE: CPT

## 2025-05-19 PROCEDURE — 2500000003 HC RX 250 WO HCPCS

## 2025-05-19 PROCEDURE — 99232 SBSQ HOSP IP/OBS MODERATE 35: CPT | Performed by: INTERNAL MEDICINE

## 2025-05-19 PROCEDURE — 86901 BLOOD TYPING SEROLOGIC RH(D): CPT

## 2025-05-19 PROCEDURE — 2580000003 HC RX 258

## 2025-05-19 PROCEDURE — 86850 RBC ANTIBODY SCREEN: CPT

## 2025-05-19 PROCEDURE — 2060000000 HC ICU INTERMEDIATE R&B

## 2025-05-19 PROCEDURE — 2580000003 HC RX 258: Performed by: NURSE ANESTHETIST, CERTIFIED REGISTERED

## 2025-05-19 PROCEDURE — 6360000002 HC RX W HCPCS: Performed by: NURSE ANESTHETIST, CERTIFIED REGISTERED

## 2025-05-19 PROCEDURE — 93312 ECHO TRANSESOPHAGEAL: CPT | Performed by: INTERNAL MEDICINE

## 2025-05-19 PROCEDURE — 93325 DOPPLER ECHO COLOR FLOW MAPG: CPT

## 2025-05-19 RX ORDER — PROPOFOL 10 MG/ML
INJECTION, EMULSION INTRAVENOUS
Status: DISCONTINUED | OUTPATIENT
Start: 2025-05-19 | End: 2025-05-19 | Stop reason: SDUPTHER

## 2025-05-19 RX ORDER — SODIUM CHLORIDE 9 MG/ML
INJECTION, SOLUTION INTRAVENOUS
Status: DISCONTINUED | OUTPATIENT
Start: 2025-05-19 | End: 2025-05-19 | Stop reason: SDUPTHER

## 2025-05-19 RX ORDER — SODIUM CHLORIDE, SODIUM LACTATE, POTASSIUM CHLORIDE, CALCIUM CHLORIDE 600; 310; 30; 20 MG/100ML; MG/100ML; MG/100ML; MG/100ML
INJECTION, SOLUTION INTRAVENOUS ONCE
Status: DISCONTINUED | OUTPATIENT
Start: 2025-05-19 | End: 2025-05-19

## 2025-05-19 RX ADMIN — OXYCODONE AND ACETAMINOPHEN 1 TABLET: 5; 325 TABLET ORAL at 22:15

## 2025-05-19 RX ADMIN — ASPIRIN 81 MG: 81 TABLET, COATED ORAL at 09:19

## 2025-05-19 RX ADMIN — OXYCODONE AND ACETAMINOPHEN 1 TABLET: 5; 325 TABLET ORAL at 05:55

## 2025-05-19 RX ADMIN — SODIUM CHLORIDE, PRESERVATIVE FREE 10 ML: 5 INJECTION INTRAVENOUS at 22:17

## 2025-05-19 RX ADMIN — OXYCODONE AND ACETAMINOPHEN 1 TABLET: 5; 325 TABLET ORAL at 14:02

## 2025-05-19 RX ADMIN — SODIUM CHLORIDE 1500 MG: 0.9 INJECTION, SOLUTION INTRAVENOUS at 22:28

## 2025-05-19 RX ADMIN — OXYCODONE AND ACETAMINOPHEN 1 TABLET: 5; 325 TABLET ORAL at 01:23

## 2025-05-19 RX ADMIN — METOPROLOL SUCCINATE 12.5 MG: 25 TABLET, FILM COATED, EXTENDED RELEASE ORAL at 09:20

## 2025-05-19 RX ADMIN — PROPOFOL 100 MCG/KG/MIN: 10 INJECTION, EMULSION INTRAVENOUS at 12:31

## 2025-05-19 RX ADMIN — ENOXAPARIN SODIUM 40 MG: 100 INJECTION SUBCUTANEOUS at 09:21

## 2025-05-19 RX ADMIN — Medication 500 MG: at 22:15

## 2025-05-19 RX ADMIN — INSULIN GLARGINE 5 UNITS: 100 INJECTION, SOLUTION SUBCUTANEOUS at 22:30

## 2025-05-19 RX ADMIN — MEROPENEM 1000 MG: 1 INJECTION INTRAVENOUS at 13:52

## 2025-05-19 RX ADMIN — TAMSULOSIN HYDROCHLORIDE 0.4 MG: 0.4 CAPSULE ORAL at 22:14

## 2025-05-19 RX ADMIN — OXYCODONE AND ACETAMINOPHEN 1 TABLET: 5; 325 TABLET ORAL at 18:05

## 2025-05-19 RX ADMIN — OXYCODONE AND ACETAMINOPHEN 1 TABLET: 5; 325 TABLET ORAL at 10:02

## 2025-05-19 RX ADMIN — SODIUM CHLORIDE, PRESERVATIVE FREE 10 ML: 5 INJECTION INTRAVENOUS at 09:27

## 2025-05-19 RX ADMIN — PANTOPRAZOLE SODIUM 40 MG: 40 TABLET, DELAYED RELEASE ORAL at 09:20

## 2025-05-19 RX ADMIN — GABAPENTIN 300 MG: 300 CAPSULE ORAL at 22:15

## 2025-05-19 RX ADMIN — FLUCONAZOLE 400 MG: 150 TABLET ORAL at 09:20

## 2025-05-19 RX ADMIN — GABAPENTIN 300 MG: 300 CAPSULE ORAL at 09:19

## 2025-05-19 RX ADMIN — CLOPIDOGREL BISULFATE 75 MG: 75 TABLET, FILM COATED ORAL at 09:20

## 2025-05-19 RX ADMIN — Medication 500 MG: at 09:19

## 2025-05-19 RX ADMIN — VANCOMYCIN HYDROCHLORIDE 2000 MG: 10 INJECTION, POWDER, LYOPHILIZED, FOR SOLUTION INTRAVENOUS at 13:55

## 2025-05-19 RX ADMIN — Medication 1 DROP: at 09:21

## 2025-05-19 RX ADMIN — MEROPENEM 1000 MG: 1 INJECTION INTRAVENOUS at 22:28

## 2025-05-19 RX ADMIN — SODIUM CHLORIDE: 9 INJECTION, SOLUTION INTRAVENOUS at 12:15

## 2025-05-19 RX ADMIN — FAMOTIDINE 40 MG: 20 TABLET, FILM COATED ORAL at 09:19

## 2025-05-19 RX ADMIN — PROPOFOL 50 MG: 10 INJECTION, EMULSION INTRAVENOUS at 12:30

## 2025-05-19 RX ADMIN — ATORVASTATIN CALCIUM 40 MG: 40 TABLET, FILM COATED ORAL at 22:15

## 2025-05-19 RX ADMIN — FOLIC ACID 1000 MCG: 1 TABLET ORAL at 09:20

## 2025-05-19 ASSESSMENT — PAIN DESCRIPTION - DESCRIPTORS
DESCRIPTORS: ACHING
DESCRIPTORS: ACHING;DISCOMFORT;SORE

## 2025-05-19 ASSESSMENT — PAIN SCALES - GENERAL
PAINLEVEL_OUTOF10: 8
PAINLEVEL_OUTOF10: 9
PAINLEVEL_OUTOF10: 0
PAINLEVEL_OUTOF10: 8
PAINLEVEL_OUTOF10: 0
PAINLEVEL_OUTOF10: 4

## 2025-05-19 ASSESSMENT — PAIN DESCRIPTION - LOCATION
LOCATION: FOOT;BACK;HIP
LOCATION: FOOT

## 2025-05-19 ASSESSMENT — PAIN DESCRIPTION - ORIENTATION
ORIENTATION: RIGHT
ORIENTATION: RIGHT;LEFT;LOWER
ORIENTATION: LEFT

## 2025-05-19 ASSESSMENT — LIFESTYLE VARIABLES: SMOKING_STATUS: 0

## 2025-05-19 ASSESSMENT — PAIN - FUNCTIONAL ASSESSMENT: PAIN_FUNCTIONAL_ASSESSMENT: PREVENTS OR INTERFERES SOME ACTIVE ACTIVITIES AND ADLS

## 2025-05-19 NOTE — CARE COORDINATION
Reviewed chart, pt for LYRIC today. Plan is [facility], they will accept back, will need new therapy and new precert. Envelope and ambulance form in soft chart.Aretha Mireles, MSW, LSW

## 2025-05-19 NOTE — ANESTHESIA POSTPROCEDURE EVALUATION
Department of Anesthesiology  Postprocedure Note    Patient: Jerome Steel  MRN: [id number]  YOB: 1960  Date of evaluation: 5/19/2025    Procedure Summary       Date: 05/19/25 Room / Location: [facility]; [facility]    Anesthesia Start: 1215 Anesthesia Stop: 1303    Procedure: LYRIC (PRN CONTRAST/BUBBLE/3D) Diagnosis: Bacteremia    Scheduled Providers: Jerome Laurent DO Responsible Provider: Jerome Laurent DO    Anesthesia Type: MAC ASA Status: 4            Anesthesia Type: MAC    Stephen Phase I: Stephen Score: 10    Stephen Phase II: Stephen Score: 10    Anesthesia Post Evaluation    Patient location during evaluation: bedside  Patient participation: complete - patient cannot participate  Level of consciousness: awake and alert  Airway patency: patent  Nausea & Vomiting: no nausea and no vomiting  Cardiovascular status: blood pressure returned to baseline  Respiratory status: acceptable  Hydration status: euvolemic  Multimodal analgesia pain management approach    No notable events documented.

## 2025-05-19 NOTE — ANESTHESIA PRE PROCEDURE
APRN - CNP        enoxaparin (LOVENOX) injection 40 mg  40 mg SubCUTAneous Daily Zheng Ramirez APRN - CNP   40 mg at 05/19/25 0921    polyethylene glycol (GLYCOLAX) packet 17 g  17 g Oral Daily PRN Zheng Ramirez APRN - CNP        acetaminophen (TYLENOL) tablet 650 mg  650 mg Oral Q6H PRN Zhegn Ramirez APRN - CNP        Or    acetaminophen (TYLENOL) suppository 650 mg  650 mg Rectal Q6H PRN Zheng Ramirez APRN - CNP        ascorbic acid (VITAMIN C) tablet 500 mg  500 mg Oral BID Zheng Ramirez APRN - CNP   500 mg at 05/19/25 0919    aspirin EC tablet 81 mg  81 mg Oral Daily Zheng Ramirez APRN - CNP   81 mg at 05/19/25 0919    atorvastatin (LIPITOR) tablet 40 mg  40 mg Oral Nightly Zheng Ramirez APRN - CNP   40 mg at 05/18/25 2107    Polyvinyl Alcohol-Povidone PF (REFRESH) 1.4-0.6 % ophthalmic solution 2 drop  2 drop Both Eyes Q8H PRN Zheng Ramirez APRN - CNP   2 drop at 05/14/25 2117    clopidogrel (PLAVIX) tablet 75 mg  75 mg Oral Daily Zheng Ramirez APRN - CNP   75 mg at 05/19/25 0920    docusate sodium (COLACE) capsule 100 mg  100 mg Oral BID PRN Zheng Ramirez APRN - CNP        famotidine (PEPCID) tablet 40 mg  40 mg Oral Daily Zheng Ramirez APRN - CNP   40 mg at 05/19/25 0919    [Held by provider] ferrous sulfate (IRON 325) tablet 325 mg  325 mg Oral Daily with breakfast Aleyda Cheek DO   325 mg at 05/13/25 1000    folic acid (FOLVITE) tablet 1,000 mcg  1,000 mcg Oral Daily Zheng Ramirez APRN - CNP   1,000 mcg at 05/19/25 0920    gabapentin (NEURONTIN) capsule 300 mg  300 mg Oral BID Zheng Ramirez APRN - CNP   300 mg at 05/19/25 0919    insulin glargine (LANTUS) injection vial 5 Units  5 Units SubCUTAneous Nightly Zheng Ramirez APRN - CNP   5 Units at 05/18/25 2107    insulin lispro (HUMALOG,ADMELOG) injection vial 0-6 Units  0-6 Units SubCUTAneous 4x Daily AC & HS Zhegn Ramirez APRN - CNP   1 Units at 05/18/25 2107    metoprolol succinate (TOPROL XL) extended release tablet

## 2025-05-19 NOTE — PROCEDURES
PROCEDURE NOTE  Date: 5/19/2025   Name: Jerome Steel  YOB: 1960    Procedures:  Preprocedure diagnosis: Bacteremia    Procedures: LYRIC    Primary procedure  Written informed consent was obtained, the LYRIC was performed under stable fasting condition. The patient was set up for monitoring of surface EKG and pulse oximetry continuously. Blood pressure was monitored and with automatic cuff measurements. Supplemental oxygen was administered. The procedure was performed under anesthesia by anesthesiology. After ensuring adequate anesthesia, LYRIC probe was intubated without difficulty.     Result: Preliminary report 9 mm x 3 mm mobile echodensity was noted on the aortic valve, full report to follow.    Complication: None    Patient was monitored until awake and vitals remained stable.    Robert Perez M.D.  [facility] cardiology

## 2025-05-19 NOTE — PLAN OF CARE
Problem: Chronic Conditions and Co-morbidities  Goal: Patient's chronic conditions and co-morbidity symptoms are monitored and maintained or improved  5/18/2025 2136 by Shirley Duncan RN  Outcome: Progressing  5/18/2025 1157 by Janette Orr RN  Outcome: Progressing  5/18/2025 1156 by Janette Orr RN  Outcome: Progressing     Problem: Discharge Planning  Goal: Discharge to home or other facility with appropriate resources  5/18/2025 2136 by Shirley Duncan RN  Outcome: Progressing  5/18/2025 1157 by Janette Orr RN  Outcome: Progressing  5/18/2025 1156 by Janette Orr RN  Outcome: Progressing     Problem: Safety - Adult  Goal: Free from fall injury  5/18/2025 2136 by Shirley Duncan RN  Outcome: Progressing  5/18/2025 1157 by Janette Orr RN  Outcome: Progressing  5/18/2025 1156 by Janette Orr RN  Outcome: Progressing     Problem: Nutrition Deficit:  Goal: Optimize nutritional status  5/18/2025 2136 by Shirley Duncan RN  Outcome: Progressing  5/18/2025 1157 by Janette Orr RN  Outcome: Progressing  5/18/2025 1156 by Janette Orr RN  Outcome: Progressing     Problem: ABCDS Injury Assessment  Goal: Absence of physical injury  5/18/2025 2136 by Shirley Duncan RN  Outcome: Progressing  5/18/2025 1157 by Janette Orr RN  Outcome: Progressing  5/18/2025 1156 by Janette Orr RN  Outcome: Progressing     Problem: Skin/Tissue Integrity  Goal: Skin integrity remains intact  Description: 1.  Monitor for areas of redness and/or skin breakdown2.  Assess vascular access sites hourly3.  Every 4-6 hours minimum:  Change oxygen saturation probe site4.  Every 4-6 hours:  If on nasal continuous positive airway pressure, respiratory therapy assess nares and determine need for appliance change or resting period  5/18/2025 2136 by Shirley Duncan RN  Outcome: Progressing  5/18/2025 1157 by Janette Orr RN  Outcome: Progressing  5/18/2025 1156 by Janette Orr RN  Outcome: Progressing

## 2025-05-19 NOTE — PLAN OF CARE
Problem: Chronic Conditions and Co-morbidities  Goal: Patient's chronic conditions and co-morbidity symptoms are monitored and maintained or improved  Outcome: Progressing     Problem: Discharge Planning  Goal: Discharge to home or other facility with appropriate resources  Outcome: Progressing     Problem: Safety - Adult  Goal: Free from fall injury  Outcome: Progressing     Problem: Nutrition Deficit:  Goal: Optimize nutritional status  Outcome: Progressing     Problem: ABCDS Injury Assessment  Goal: Absence of physical injury  Outcome: Progressing

## 2025-05-20 ENCOUNTER — ANESTHESIA (OUTPATIENT)
Dept: OPERATING ROOM | Age: 65
DRG: 616 | End: 2025-05-20
Payer: COMMERCIAL

## 2025-05-20 ENCOUNTER — ANESTHESIA EVENT (OUTPATIENT)
Dept: OPERATING ROOM | Age: 65
DRG: 616 | End: 2025-05-20
Payer: COMMERCIAL

## 2025-05-20 PROBLEM — S91.301A OPEN WOUND OF RIGHT HEEL: Status: ACTIVE | Noted: 2025-05-20

## 2025-05-20 LAB
CREAT SERPL-MCNC: 0.6 MG/DL (ref 0.7–1.2)
DATE LAST DOSE: NORMAL
ECHO BSA: 1.94 M2
GFR, ESTIMATED: >90 ML/MIN/1.73M2
GLUCOSE BLD-MCNC: 102 MG/DL (ref 74–99)
GLUCOSE BLD-MCNC: 106 MG/DL (ref 74–99)
GLUCOSE BLD-MCNC: 125 MG/DL (ref 74–99)
GLUCOSE BLD-MCNC: 97 MG/DL (ref 74–99)
TME LAST DOSE: NORMAL H
VANCOMYCIN DOSE: NORMAL MG
VANCOMYCIN SERPL-MCNC: 23.9 UG/ML (ref 5–40)

## 2025-05-20 PROCEDURE — 6360000002 HC RX W HCPCS

## 2025-05-20 PROCEDURE — 82962 GLUCOSE BLOOD TEST: CPT

## 2025-05-20 PROCEDURE — 2709999900 HC NON-CHARGEABLE SUPPLY: Performed by: SURGERY

## 2025-05-20 PROCEDURE — 2580000003 HC RX 258

## 2025-05-20 PROCEDURE — 2580000003 HC RX 258: Performed by: NURSE PRACTITIONER

## 2025-05-20 PROCEDURE — 3600000014 HC SURGERY LEVEL 4 ADDTL 15MIN: Performed by: SURGERY

## 2025-05-20 PROCEDURE — 2500000003 HC RX 250 WO HCPCS

## 2025-05-20 PROCEDURE — 82565 ASSAY OF CREATININE: CPT

## 2025-05-20 PROCEDURE — 99222 1ST HOSP IP/OBS MODERATE 55: CPT | Performed by: THORACIC SURGERY (CARDIOTHORACIC VASCULAR SURGERY)

## 2025-05-20 PROCEDURE — 80202 ASSAY OF VANCOMYCIN: CPT

## 2025-05-20 PROCEDURE — 3700000000 HC ANESTHESIA ATTENDED CARE: Performed by: SURGERY

## 2025-05-20 PROCEDURE — 6370000000 HC RX 637 (ALT 250 FOR IP)

## 2025-05-20 PROCEDURE — 36415 COLL VENOUS BLD VENIPUNCTURE: CPT

## 2025-05-20 PROCEDURE — 64447 NJX AA&/STRD FEMORAL NRV IMG: CPT | Performed by: STUDENT IN AN ORGANIZED HEALTH CARE EDUCATION/TRAINING PROGRAM

## 2025-05-20 PROCEDURE — 3700000001 HC ADD 15 MINUTES (ANESTHESIA): Performed by: SURGERY

## 2025-05-20 PROCEDURE — 7100000001 HC PACU RECOVERY - ADDTL 15 MIN: Performed by: SURGERY

## 2025-05-20 PROCEDURE — 3600000004 HC SURGERY LEVEL 4 BASE: Performed by: SURGERY

## 2025-05-20 PROCEDURE — 88307 TISSUE EXAM BY PATHOLOGIST: CPT

## 2025-05-20 PROCEDURE — 64445 NJX AA&/STRD SCIATIC NRV IMG: CPT | Performed by: STUDENT IN AN ORGANIZED HEALTH CARE EDUCATION/TRAINING PROGRAM

## 2025-05-20 PROCEDURE — 0Y6H0Z1 DETACHMENT AT RIGHT LOWER LEG, HIGH, OPEN APPROACH: ICD-10-PCS | Performed by: SURGERY

## 2025-05-20 PROCEDURE — 2060000000 HC ICU INTERMEDIATE R&B

## 2025-05-20 PROCEDURE — 99232 SBSQ HOSP IP/OBS MODERATE 35: CPT | Performed by: STUDENT IN AN ORGANIZED HEALTH CARE EDUCATION/TRAINING PROGRAM

## 2025-05-20 PROCEDURE — L3650 SO 8 ABD RESTRAINT PRE OTS: HCPCS | Performed by: SURGERY

## 2025-05-20 PROCEDURE — 7100000000 HC PACU RECOVERY - FIRST 15 MIN: Performed by: SURGERY

## 2025-05-20 PROCEDURE — 88311 DECALCIFY TISSUE: CPT

## 2025-05-20 PROCEDURE — 6360000002 HC RX W HCPCS: Performed by: STUDENT IN AN ORGANIZED HEALTH CARE EDUCATION/TRAINING PROGRAM

## 2025-05-20 PROCEDURE — 6360000002 HC RX W HCPCS: Performed by: NURSE PRACTITIONER

## 2025-05-20 RX ORDER — PROPOFOL 10 MG/ML
INJECTION, EMULSION INTRAVENOUS
Status: DISCONTINUED | OUTPATIENT
Start: 2025-05-20 | End: 2025-05-20 | Stop reason: SDUPTHER

## 2025-05-20 RX ORDER — DEXMEDETOMIDINE HYDROCHLORIDE 100 UG/ML
INJECTION, SOLUTION INTRAVENOUS
Status: DISCONTINUED | OUTPATIENT
Start: 2025-05-20 | End: 2025-05-20 | Stop reason: SDUPTHER

## 2025-05-20 RX ORDER — MIDAZOLAM HYDROCHLORIDE 1 MG/ML
INJECTION, SOLUTION INTRAMUSCULAR; INTRAVENOUS
Status: DISCONTINUED | OUTPATIENT
Start: 2025-05-20 | End: 2025-05-20 | Stop reason: SDUPTHER

## 2025-05-20 RX ORDER — HYDROMORPHONE HYDROCHLORIDE 1 MG/ML
0.25 INJECTION, SOLUTION INTRAMUSCULAR; INTRAVENOUS; SUBCUTANEOUS EVERY 5 MIN PRN
Status: DISCONTINUED | OUTPATIENT
Start: 2025-05-20 | End: 2025-05-20 | Stop reason: HOSPADM

## 2025-05-20 RX ORDER — ONDANSETRON 2 MG/ML
4 INJECTION INTRAMUSCULAR; INTRAVENOUS
Status: DISCONTINUED | OUTPATIENT
Start: 2025-05-20 | End: 2025-05-20 | Stop reason: HOSPADM

## 2025-05-20 RX ORDER — PHENYLEPHRINE HCL IN 0.9% NACL 1 MG/10 ML
SYRINGE (ML) INTRAVENOUS
Status: DISCONTINUED | OUTPATIENT
Start: 2025-05-20 | End: 2025-05-20 | Stop reason: SDUPTHER

## 2025-05-20 RX ORDER — OXYCODONE HYDROCHLORIDE 5 MG/1
5 TABLET ORAL EVERY 6 HOURS PRN
Status: DISCONTINUED | OUTPATIENT
Start: 2025-05-20 | End: 2025-05-21

## 2025-05-20 RX ORDER — SODIUM CHLORIDE 0.9 % (FLUSH) 0.9 %
5-40 SYRINGE (ML) INJECTION EVERY 12 HOURS SCHEDULED
Status: DISCONTINUED | OUTPATIENT
Start: 2025-05-20 | End: 2025-05-20 | Stop reason: HOSPADM

## 2025-05-20 RX ORDER — SODIUM CHLORIDE 0.9 % (FLUSH) 0.9 %
5-40 SYRINGE (ML) INJECTION PRN
Status: DISCONTINUED | OUTPATIENT
Start: 2025-05-20 | End: 2025-05-20 | Stop reason: HOSPADM

## 2025-05-20 RX ORDER — ROPIVACAINE HYDROCHLORIDE 5 MG/ML
INJECTION, SOLUTION EPIDURAL; INFILTRATION; PERINEURAL
Status: COMPLETED | OUTPATIENT
Start: 2025-05-20 | End: 2025-05-20

## 2025-05-20 RX ORDER — GABAPENTIN 300 MG/1
300 CAPSULE ORAL 3 TIMES DAILY
Status: DISCONTINUED | OUTPATIENT
Start: 2025-05-20 | End: 2025-05-23 | Stop reason: HOSPADM

## 2025-05-20 RX ORDER — LIDOCAINE HYDROCHLORIDE 20 MG/ML
INJECTION, SOLUTION INTRAVENOUS
Status: DISCONTINUED | OUTPATIENT
Start: 2025-05-20 | End: 2025-05-20 | Stop reason: SDUPTHER

## 2025-05-20 RX ORDER — SENNA AND DOCUSATE SODIUM 50; 8.6 MG/1; MG/1
1 TABLET, FILM COATED ORAL 2 TIMES DAILY
Status: DISCONTINUED | OUTPATIENT
Start: 2025-05-20 | End: 2025-05-21

## 2025-05-20 RX ORDER — IPRATROPIUM BROMIDE AND ALBUTEROL SULFATE 2.5; .5 MG/3ML; MG/3ML
1 SOLUTION RESPIRATORY (INHALATION)
Status: DISCONTINUED | OUTPATIENT
Start: 2025-05-20 | End: 2025-05-20 | Stop reason: HOSPADM

## 2025-05-20 RX ORDER — PROCHLORPERAZINE EDISYLATE 5 MG/ML
5 INJECTION INTRAMUSCULAR; INTRAVENOUS
Status: DISCONTINUED | OUTPATIENT
Start: 2025-05-20 | End: 2025-05-20 | Stop reason: HOSPADM

## 2025-05-20 RX ORDER — NALOXONE HYDROCHLORIDE 0.4 MG/ML
INJECTION, SOLUTION INTRAMUSCULAR; INTRAVENOUS; SUBCUTANEOUS PRN
Status: DISCONTINUED | OUTPATIENT
Start: 2025-05-20 | End: 2025-05-20 | Stop reason: HOSPADM

## 2025-05-20 RX ORDER — SODIUM CHLORIDE 9 MG/ML
INJECTION, SOLUTION INTRAVENOUS PRN
Status: DISCONTINUED | OUTPATIENT
Start: 2025-05-20 | End: 2025-05-20 | Stop reason: HOSPADM

## 2025-05-20 RX ORDER — HYDROMORPHONE HYDROCHLORIDE 1 MG/ML
0.5 INJECTION, SOLUTION INTRAMUSCULAR; INTRAVENOUS; SUBCUTANEOUS EVERY 5 MIN PRN
Status: DISCONTINUED | OUTPATIENT
Start: 2025-05-20 | End: 2025-05-20 | Stop reason: HOSPADM

## 2025-05-20 RX ORDER — LABETALOL HYDROCHLORIDE 5 MG/ML
5 INJECTION, SOLUTION INTRAVENOUS
Status: DISCONTINUED | OUTPATIENT
Start: 2025-05-20 | End: 2025-05-20 | Stop reason: HOSPADM

## 2025-05-20 RX ORDER — DIPHENHYDRAMINE HYDROCHLORIDE 50 MG/ML
12.5 INJECTION, SOLUTION INTRAMUSCULAR; INTRAVENOUS
Status: DISCONTINUED | OUTPATIENT
Start: 2025-05-20 | End: 2025-05-20 | Stop reason: HOSPADM

## 2025-05-20 RX ORDER — MEPERIDINE HYDROCHLORIDE 25 MG/ML
12.5 INJECTION INTRAMUSCULAR; INTRAVENOUS; SUBCUTANEOUS
Status: DISCONTINUED | OUTPATIENT
Start: 2025-05-20 | End: 2025-05-20 | Stop reason: HOSPADM

## 2025-05-20 RX ORDER — SODIUM CHLORIDE 9 MG/ML
INJECTION, SOLUTION INTRAVENOUS CONTINUOUS
Status: ACTIVE | OUTPATIENT
Start: 2025-05-20 | End: 2025-05-20

## 2025-05-20 RX ORDER — NALOXONE HYDROCHLORIDE 0.4 MG/ML
INJECTION, SOLUTION INTRAMUSCULAR; INTRAVENOUS; SUBCUTANEOUS PRN
Status: DISCONTINUED | OUTPATIENT
Start: 2025-05-20 | End: 2025-05-21

## 2025-05-20 RX ORDER — ROPIVACAINE HYDROCHLORIDE 5 MG/ML
INJECTION, SOLUTION EPIDURAL; INFILTRATION; PERINEURAL
Status: COMPLETED
Start: 2025-05-20 | End: 2025-05-20

## 2025-05-20 RX ORDER — FENTANYL CITRATE 50 UG/ML
INJECTION, SOLUTION INTRAMUSCULAR; INTRAVENOUS
Status: DISCONTINUED | OUTPATIENT
Start: 2025-05-20 | End: 2025-05-20 | Stop reason: SDUPTHER

## 2025-05-20 RX ORDER — HEPARIN SODIUM 5000 [USP'U]/ML
5000 INJECTION, SOLUTION INTRAVENOUS; SUBCUTANEOUS EVERY 8 HOURS SCHEDULED
Status: DISCONTINUED | OUTPATIENT
Start: 2025-05-21 | End: 2025-05-23 | Stop reason: HOSPADM

## 2025-05-20 RX ADMIN — INSULIN GLARGINE 5 UNITS: 100 INJECTION, SOLUTION SUBCUTANEOUS at 21:01

## 2025-05-20 RX ADMIN — MEROPENEM 1000 MG: 1 INJECTION INTRAVENOUS at 14:04

## 2025-05-20 RX ADMIN — Medication 500 MG: at 20:38

## 2025-05-20 RX ADMIN — MIDAZOLAM 2 MG: 1 INJECTION INTRAMUSCULAR; INTRAVENOUS at 17:16

## 2025-05-20 RX ADMIN — DEXMEDETOMIDINE HCL 8 MCG: 100 INJECTION INTRAVENOUS at 17:16

## 2025-05-20 RX ADMIN — SODIUM CHLORIDE, PRESERVATIVE FREE 10 ML: 5 INJECTION INTRAVENOUS at 08:48

## 2025-05-20 RX ADMIN — OXYCODONE HYDROCHLORIDE 5 MG: 5 TABLET ORAL at 20:38

## 2025-05-20 RX ADMIN — Medication 50 MCG: at 18:12

## 2025-05-20 RX ADMIN — METOPROLOL SUCCINATE 12.5 MG: 25 TABLET, FILM COATED, EXTENDED RELEASE ORAL at 08:48

## 2025-05-20 RX ADMIN — POLYVINYL ALCOHOL, POVIDONE 2 DROP: 14; 6 SOLUTION/ DROPS OPHTHALMIC at 20:38

## 2025-05-20 RX ADMIN — GABAPENTIN 300 MG: 300 CAPSULE ORAL at 20:38

## 2025-05-20 RX ADMIN — Medication 100 MCG: at 18:21

## 2025-05-20 RX ADMIN — MEROPENEM 1000 MG: 1 INJECTION INTRAVENOUS at 05:42

## 2025-05-20 RX ADMIN — MEROPENEM 1000 MG: 1 INJECTION INTRAVENOUS at 21:09

## 2025-05-20 RX ADMIN — PROPOFOL 50 MCG/KG/MIN: 10 INJECTION, EMULSION INTRAVENOUS at 17:16

## 2025-05-20 RX ADMIN — DEXMEDETOMIDINE HCL 8 MCG: 100 INJECTION INTRAVENOUS at 17:35

## 2025-05-20 RX ADMIN — ROPIVACAINE HYDROCHLORIDE 30 ML: 5 INJECTION, SOLUTION EPIDURAL; INFILTRATION; PERINEURAL at 17:00

## 2025-05-20 RX ADMIN — OXYCODONE AND ACETAMINOPHEN 1 TABLET: 5; 325 TABLET ORAL at 15:44

## 2025-05-20 RX ADMIN — ROPIVACAINE HYDROCHLORIDE 15 ML: 5 INJECTION EPIDURAL; INFILTRATION; PERINEURAL at 17:02

## 2025-05-20 RX ADMIN — SODIUM CHLORIDE 1500 MG: 0.9 INJECTION, SOLUTION INTRAVENOUS at 21:09

## 2025-05-20 RX ADMIN — Medication 100 MCG: at 18:03

## 2025-05-20 RX ADMIN — OXYCODONE AND ACETAMINOPHEN 1 TABLET: 5; 325 TABLET ORAL at 11:11

## 2025-05-20 RX ADMIN — SENNOSIDES AND DOCUSATE SODIUM 1 TABLET: 50; 8.6 TABLET ORAL at 20:38

## 2025-05-20 RX ADMIN — FENTANYL CITRATE 25 MCG: 50 INJECTION, SOLUTION INTRAMUSCULAR; INTRAVENOUS at 17:33

## 2025-05-20 RX ADMIN — FLUCONAZOLE 400 MG: 150 TABLET ORAL at 08:49

## 2025-05-20 RX ADMIN — SODIUM CHLORIDE: 9 INJECTION, SOLUTION INTRAVENOUS at 07:02

## 2025-05-20 RX ADMIN — OXYCODONE AND ACETAMINOPHEN 1 TABLET: 5; 325 TABLET ORAL at 07:04

## 2025-05-20 RX ADMIN — SODIUM CHLORIDE 1500 MG: 0.9 INJECTION, SOLUTION INTRAVENOUS at 11:01

## 2025-05-20 RX ADMIN — GABAPENTIN 300 MG: 300 CAPSULE ORAL at 08:48

## 2025-05-20 RX ADMIN — SODIUM CHLORIDE: 9 INJECTION, SOLUTION INTRAVENOUS at 18:21

## 2025-05-20 RX ADMIN — LIDOCAINE HYDROCHLORIDE 50 MG: 20 INJECTION, SOLUTION INTRAVENOUS at 17:16

## 2025-05-20 RX ADMIN — ATORVASTATIN CALCIUM 40 MG: 40 TABLET, FILM COATED ORAL at 20:38

## 2025-05-20 RX ADMIN — TAMSULOSIN HYDROCHLORIDE 0.4 MG: 0.4 CAPSULE ORAL at 20:38

## 2025-05-20 RX ADMIN — Medication 100 MCG: at 17:55

## 2025-05-20 RX ADMIN — OXYCODONE AND ACETAMINOPHEN 1 TABLET: 5; 325 TABLET ORAL at 02:24

## 2025-05-20 ASSESSMENT — PAIN SCALES - GENERAL
PAINLEVEL_OUTOF10: 0
PAINLEVEL_OUTOF10: 0
PAINLEVEL_OUTOF10: 9
PAINLEVEL_OUTOF10: 8
PAINLEVEL_OUTOF10: 9
PAINLEVEL_OUTOF10: 8
PAINLEVEL_OUTOF10: 6
PAINLEVEL_OUTOF10: 0
PAINLEVEL_OUTOF10: 7
PAINLEVEL_OUTOF10: 8

## 2025-05-20 ASSESSMENT — PAIN DESCRIPTION - LOCATION
LOCATION: BACK;HIP;SHOULDER
LOCATION: FOOT
LOCATION: FOOT;HIP;BACK
LOCATION: FOOT
LOCATION: BACK;HIP;FOOT

## 2025-05-20 ASSESSMENT — PAIN - FUNCTIONAL ASSESSMENT
PAIN_FUNCTIONAL_ASSESSMENT: PREVENTS OR INTERFERES SOME ACTIVE ACTIVITIES AND ADLS
PAIN_FUNCTIONAL_ASSESSMENT: ACTIVITIES ARE NOT PREVENTED
PAIN_FUNCTIONAL_ASSESSMENT: ACTIVITIES ARE NOT PREVENTED
PAIN_FUNCTIONAL_ASSESSMENT: NONE - DENIES PAIN

## 2025-05-20 ASSESSMENT — PAIN DESCRIPTION - ORIENTATION
ORIENTATION: RIGHT
ORIENTATION: LEFT;LOWER
ORIENTATION: RIGHT;LEFT;LOWER
ORIENTATION: RIGHT;LEFT;LOWER
ORIENTATION: RIGHT

## 2025-05-20 ASSESSMENT — PAIN DESCRIPTION - ONSET
ONSET: ON-GOING
ONSET: ON-GOING

## 2025-05-20 ASSESSMENT — PAIN DESCRIPTION - DESCRIPTORS
DESCRIPTORS: ACHING;DISCOMFORT;SORE
DESCRIPTORS: ACHING;SORE
DESCRIPTORS: ACHING;DISCOMFORT;SORE

## 2025-05-20 ASSESSMENT — PAIN DESCRIPTION - FREQUENCY
FREQUENCY: CONTINUOUS
FREQUENCY: CONTINUOUS

## 2025-05-20 ASSESSMENT — PAIN SCALES - WONG BAKER
WONGBAKER_NUMERICALRESPONSE: NO HURT

## 2025-05-20 ASSESSMENT — PAIN DESCRIPTION - PAIN TYPE
TYPE: ACUTE PAIN
TYPE: SURGICAL PAIN;ACUTE PAIN

## 2025-05-20 ASSESSMENT — LIFESTYLE VARIABLES: SMOKING_STATUS: 0

## 2025-05-20 NOTE — ANESTHESIA PROCEDURE NOTES
Peripheral Block    Patient location during procedure: procedure area  Reason for block: post-op pain management  Start time: 5/20/2025 5:02 PM  End time: 5/20/2025 5:04 PM  Staffing  Performed: anesthesiologist   Anesthesiologist: Zheng Gracia DO  Performed by: Zheng Gracia DO  Authorized by: Zheng Gracia DO    Preanesthetic Checklist  Completed: patient identified, IV checked, site marked, risks and benefits discussed, surgical/procedural consents, equipment checked, pre-op evaluation, timeout performed, anesthesia consent given, oxygen available, monitors applied/VS acknowledged, fire risk safety assessment completed and verbalized and blood product R/B/A discussed and consented  Peripheral Block   Patient position: supine  Prep: ChloraPrep  Provider prep: sterile gloves and mask  Patient monitoring: continuous pulse ox, frequent blood pressure checks, IV access, oxygen and responsive to questions  Block type: Femoral  Femoral crease  Laterality: right  Injection technique: single-shot  Guidance: ultrasound guided  Anesthesia block local: with 1:370611 Epinephrine.  Anesthesia block local: with 1:345588 Epinephrine.    Needle   Needle type: insulated echogenic nerve stimulator needle   Needle gauge: 20 G  Needle localization: ultrasound guidance  Needle length: 10 cm  Assessment   Injection assessment: negative aspiration for heme, no paresthesia on injection, local visualized surrounding nerve on ultrasound, no intravascular symptoms and low pressure verified by pressure monitor  Paresthesia pain: none  Slow fractionated injection: yes  Hemodynamics: stable  Outcomes: uncomplicated and patient tolerated procedure well    Medications Administered  ropivacaine (NAROPIN) injection 0.5% - Perineural   15 mL - 5/20/2025 5:02:00 PM

## 2025-05-20 NOTE — PLAN OF CARE
Problem: Chronic Conditions and Co-morbidities  Goal: Patient's chronic conditions and co-morbidity symptoms are monitored and maintained or improved  Outcome: Progressing     Problem: Discharge Planning  Goal: Discharge to home or other facility with appropriate resources  Outcome: Progressing     Problem: Safety - Adult  Goal: Free from fall injury  Outcome: Progressing     Problem: Nutrition Deficit:  Goal: Optimize nutritional status  Outcome: Progressing     Problem: ABCDS Injury Assessment  Goal: Absence of physical injury  Outcome: Progressing     Problem: Skin/Tissue Integrity  Goal: Skin integrity remains intact  Description: 1.  Monitor for areas of redness and/or skin breakdown2.  Assess vascular access sites hourly3.  Every 4-6 hours minimum:  Change oxygen saturation probe site4.  Every 4-6 hours:  If on nasal continuous positive airway pressure, respiratory therapy assess nares and determine need for appliance change or resting period  Outcome: Progressing     Problem: Pain  Goal: Verbalizes/displays adequate comfort level or baseline comfort level  Outcome: Progressing     Problem: Infection - Adult  Goal: Absence of infection at discharge  Outcome: Progressing  Goal: Absence of infection during hospitalization  Outcome: Progressing  Goal: Absence of fever/infection during anticipated neutropenic period  Outcome: Progressing     Problem: Metabolic/Fluid and Electrolytes - Adult  Goal: Electrolytes maintained within normal limits  Outcome: Progressing  Goal: Hemodynamic stability and optimal renal function maintained  Outcome: Progressing  Goal: Glucose maintained within prescribed range  Outcome: Progressing

## 2025-05-20 NOTE — OP NOTE
[facility]              [address] [place] [address]                            OPERATIVE REPORT      PATIENT NAME: LUCIO ORELLANA             : 1960  MED REC NO: [id number]                        ROOM: [facility]  ACCOUNT NO: [id number]                       ADMIT DATE: 2025  PROVIDER: Rober Harrell DPM      DATE OF PROCEDURE:  2025    SURGEON:  Rober Harrell DPM    PREOPERATIVE DIAGNOSES:    1. Acute osteomyelitis, right foot.  2. Cellulitis, right foot.  3. Ulceration of right heel with necrosis of muscle.  4. Insulin-dependent diabetes mellitus with neuropathy.    POSTOPERATIVE DIAGNOSES:    1. Acute osteomyelitis, right foot.  2. Cellulitis, right foot.  3. Ulceration of right heel with necrosis of muscle.  4. Insulin-dependent diabetes mellitus with neuropathy.    PROCEDURES:    1. Excisional wound debridement of right heel wound to and through level of deep fascia and muscle, total measurement 6 cm x 4.5 cm x 0.5 cm in dimension.  2. Incision and drainage, right calcaneus bone.    ANESTHESIA:  Monitored anesthesia care.    INJECTABLES:  20 mL of 0.5% Marcaine plain.    COMPLICATIONS:  None.    HEMOSTASIS:  On the field.    ESTIMATED BLOOD LOSS:  Minimal.    SPECIMEN OBTAINED:  Right calcaneus bone for pathological and microbiological examination.  Right heel abscess and right heel wound.    INTRAOPERATIVE FINDINGS:  Necrosis of soft tissue and bone with necrosis of the posterior tuber of the calcaneus consistent with osteomyelitis.    INDICATIONS:  A 64-year-old male presenting for right heel wound debridement and incision and drainage.  I counseled the patient on the nature of the problem, proposed course of procedure, potential benefits, risks, complications, and convalescence in detail.  All questions were answered to the patient's satisfaction.  No guarantees were given as to the outcome of procedure.    DESCRIPTION OF PROCEDURE: 
Cardiovascular Lab Procedure Report    Jerome Steel  1960    Date : 5/16/2025  Surgeon: Maida Gutierrez M.D.  Pre-procedure Diagnosis: R LE tissue loss  Post-procedure Diagnosis: Same  Procedure:      Left  common femoral artery access   with US guidance    8 fr angioseal used for closure    Bilateral lower extremity angiogram   47029 TF Right lower extremity angiogram   34244 Angiogram with catheter in Right   common femoral, popliteal artery   Anesthesia: Local with IV sedation  Assistants: Cath Lab Staff  Estimated Blood Loss: Minimal  Complications: none  Findings: Abdominal aorta Patent  B/L Common, external, internal iliac arteries Patent   Right Left   Common Iliac Art Patent Patent   External Iliac Art PatentPatent Patent   Internal Iliac Art Patent Patent   Common Femoral Art Patent Patent   Superficial Femoral Art > 80% stenosis proximally, distally occluded Not imaged   Profunda Femoral Art patent Not imaged   AK Popliteal Art occluded Not imaged   BK Popliteal Art Occluded, distal recon Not imaged   Anterior Tibial Art Patent but diseased, some flow into foot Not imaged   Tibioperoneal Trunk Patent Not imaged   Peroneal Art Patent, primary runoff to foot Not imaged   Posterior Tibial Art Distal recon from peroneal at ankle Not imaged     Procedure Details :  There was not previous CTA  or catheter based diagnostic imaging preformed prior to today's procedure.  Timeout preformed identifying pt and procedure. Groins prepped and draped in sterile fashion. Patient given sedation as needed throughout the case.     Left  common femoral artery was noted to be patent and was accessed under ultrasound guidance after infiltrating with local.  Image was documented and saved.  Micropuncture placed, exchanged out for 5 fr sheath.     Advantage glide wire and contra catheter advanced into distal aorta. Bilateral lower extremity angiogram preformed. Catheter and wire used to access Right  iliac system 
lower leg approximately 8 cm distal to the tibial tuberosity. Dissection continued down through the subcutaneous tissue. The incision was extended to the medial and lateral midline. Then the incision was turned inferior to incorporate a posterior-based myocutaneous flap. Dissection continued through the fascia and the muscle compartments.     The tibial vessels were divided between silk ties. The gastrocnemius muscle was included in the posterior flap and the remaining muscles were divided at the level of the anterior incision. The tibia and fibula were freed from the surrounding tissues and divided with the saw. The anterior edge of the tibia was cut at an angle and the bone edges were smoothed with a file. The nerves were divided above the incision line.   The incision was irrigated with antibiotic saline solution and hemostasis was obtained.    A muscle flap was rotated onto the tibia for coverage.      The myocutaneous flap was rotated anteriorly and the fascia was approximated with interrupted 2-0 Vicryl sutures. The skin was closed with interrupted 3-0 vicryl and than skin clips.  The skin was cleaned and dried.    Skin was then approximated with staples. Skin was clean and dried again.     Prevana was applied.  Seal obtained.    Needle, sponge, and instruments counts were reported as correct x2. The patient tolerated the procedure and was transferred to the recovery area in satisfactory condition.     Maida Gutierrez MD    CC : Genet Kraus DO Dr Fahim      Electronically signed by Madia Gutierrez MD on 5/20/2025 at 6:47 PM

## 2025-05-20 NOTE — ANESTHESIA PROCEDURE NOTES
Peripheral Block    Patient location during procedure: procedure area  Reason for block: post-op pain management  Start time: 5/20/2025 5:00 PM  End time: 5/20/2025 5:02 PM  Staffing  Performed: anesthesiologist   Anesthesiologist: Zheng Gracia DO  Performed by: Zheng Gracia DO  Authorized by: Zheng Gracia DO    Preanesthetic Checklist  Completed: patient identified, IV checked, site marked, risks and benefits discussed, surgical/procedural consents, equipment checked, pre-op evaluation, timeout performed, anesthesia consent given, oxygen available, monitors applied/VS acknowledged, fire risk safety assessment completed and verbalized and blood product R/B/A discussed and consented  Peripheral Block   Patient position: supine  Prep: ChloraPrep  Provider prep: sterile gloves and mask  Patient monitoring: continuous pulse ox, frequent blood pressure checks, IV access, oxygen and responsive to questions  Block type: Sciatic  Popliteal  Laterality: right  Injection technique: single-shot  Guidance: ultrasound guided  Anesthesia block local: with 1:298860 Epinephrine.  Anesthesia block local: with 1:697358 Epinephrine.    Needle   Needle type: insulated echogenic nerve stimulator needle   Needle gauge: 20 G  Needle localization: ultrasound guidance  Needle length: 10 cm  Assessment   Injection assessment: negative aspiration for heme, no paresthesia on injection, local visualized surrounding nerve on ultrasound, no intravascular symptoms and low pressure verified by pressure monitor  Paresthesia pain: none  Slow fractionated injection: yes  Hemodynamics: stable  Outcomes: uncomplicated and patient tolerated procedure well    Medications Administered  ROPivacaine 0.5% with EPINEPHrine 1:[id number] injection (ANESTHESIA USE ONLY) (Mixture components: EPINEPHrine PF 1 MG/ML Soln, 0.005 mL; ROPivacaine 0.5% Soln, 1 mL) - Perineural   30 mL - 5/20/2025 5:00:00 PM

## 2025-05-20 NOTE — ANESTHESIA POSTPROCEDURE EVALUATION
Department of Anesthesiology  Postprocedure Note    Patient: Jerome Steel  MRN: [id number]  YOB: 1960  Date of evaluation: 5/20/2025    Procedure Summary       Date: 05/20/25 Room / Location: [address]    Anesthesia Start: 1706 Anesthesia Stop: 1856    Procedure: BELOW KNEE AMPUTATION RIGHT LEG (Right: Leg Lower) Diagnosis:       Peripheral vascular disease, unspecified      (Peripheral vascular disease, unspecified [I73.9])    Surgeons: Maida Gutierrez MD Responsible Provider: Zheng Gracia DO    Anesthesia Type: MAC ASA Status: 4            Anesthesia Type: No value filed.    Stephen Phase I: Stephen Score: 10    Stephen Phase II: Stephen Score: 10    Anesthesia Post Evaluation    Patient location during evaluation: PACU  Patient participation: complete - patient participated  Level of consciousness: awake  Cardiovascular status: blood pressure returned to baseline  Respiratory status: acceptable  Hydration status: euvolemic  Multimodal analgesia pain management approach  Pain management: adequate    No notable events documented.

## 2025-05-20 NOTE — CARE COORDINATION
Reviewed chart, pt for RBKA today. Met with pt, pt remains agreeable for BKA, [facility]. Updated [facility] from [facility]. CTS consulted for vegetation on LYRIC.  Will need therapy and precert after BKA to [facility]. Envelope and ambulance form in soft chart.Aretha Mireles, MSW, LSW

## 2025-05-20 NOTE — ANESTHESIA PRE PROCEDURE
Department of Anesthesiology  Preprocedure Note       Name:  Jerome Steel   Age:  64 y.o.  :  1960                                          MRN:  [id number]         Date:  2025      Surgeon: Dr. Perez    Procedure: LYRIC    Medications prior to admission:   Prior to Admission medications    Medication Sig Start Date End Date Taking? Authorizing Provider   insulin glargine (LANTUS) 100 UNIT/ML injection vial Inject 5 Units into the skin nightly 25   Aki Linda MD   insulin lispro (HUMALOG,ADMELOG) 100 UNIT/ML SOLN injection vial Inject 0-6 Units into the skin 4 times daily (before meals and nightly) **Corrective Low Dose Algorithm**  Glucose: Dose:             No Insulin  150-200 1 Unit  201-250 2 Units  251-300 3 Units  301-350 4 Units  351-400 5 Units  Over 400 6 Units 25   Aki Linda MD   aspirin 81 MG EC tablet Take 1 tablet by mouth daily 25   Soraida Colorado APRN - CNP   enoxaparin (LOVENOX) 40 MG/0.4ML Inject 0.4 mLs into the skin daily Continue until consistently ambulating 400ft 25  Soraida Colorado APRN - CNP   atorvastatin (LIPITOR) 40 MG tablet Take 1 tablet by mouth nightly 25   Soraida Colorado APRN - CNP   metoprolol succinate (TOPROL XL) 25 MG extended release tablet Take 0.5 tablets by mouth daily 25   Soraida Colorado APRN - CNP   magnesium oxide (MAG-OX) 400 (240 Mg) MG tablet Take 1 tablet by mouth 2 times daily for 14 days 25  Soraida Colorado APRN - CNP   clopidogrel (PLAVIX) 75 MG tablet Take 1 tablet by mouth daily 25  Soraida Colorado APRN - CNP   ascorbic acid (VITAMIN C) 500 MG tablet Take 1 tablet by mouth 2 times daily 25  Soraida Colorado APRN - CNP   docusate sodium (COLACE) 100 MG capsule Take 1 capsule by mouth 2 times daily as needed for Constipation 25  Soraida Colorado, APRN - CNP   famotidine (PEPCID) 40 MG tablet Take 1 tablet by mouth daily 25   
 IntraVENous PRN Zheng Ramirez APRN - CNP        ondansetron (ZOFRAN-ODT) disintegrating tablet 4 mg  4 mg Oral Q8H PRN Zheng Ramirez APRN - CNP        Or    ondansetron (ZOFRAN) injection 4 mg  4 mg IntraVENous Q6H PRN Zheng Ramirez APRN - CNP        fluconazole (DIFLUCAN) tablet 400 mg  400 mg Oral Daily Zheng Ramirez APRN - CNP   400 mg at 05/20/25 0849    tetrahydrozoline 0.05 % ophthalmic solution 1 drop  1 drop Ophthalmic Q4H PRN Zheng Ramirez APRN - CNP   1 drop at 05/19/25 0921    benzocaine-menthol (CEPACOL SORE THROAT) lozenge 1 lozenge  1 lozenge Oral Q2H PRN Zheng Ramirez APRN - CNP        benzonatate (TESSALON) capsule 100 mg  100 mg Oral TID PRN Zheng Ramirez APRN - CNP        calcium carbonate (TUMS) chewable tablet 500 mg  500 mg Oral TID PRN Zheng Ramirez APRN - CNP        hydrALAZINE (APRESOLINE) injection 10 mg  10 mg IntraVENous Q6H PRN Zheng Ramirez APRN - CNP        melatonin tablet 3 mg  3 mg Oral Nightly PRN Zheng Ramirez APRN - CNP        sodium chloride (OCEAN, BABY AYR) 0.65 % nasal spray 1 spray  1 spray Each Nostril PRN Zheng Ramirez APRN - CNP        sodium chloride flush 0.9 % injection 5-40 mL  5-40 mL IntraVENous 2 times per day Zheng Ramirez APRN - CNP   10 mL at 05/20/25 0848    sodium chloride flush 0.9 % injection 5-40 mL  5-40 mL IntraVENous PRN Zheng Ramirez APRN - CNP        0.9 % sodium chloride infusion   IntraVENous PRN Zheng Ramirez APRN - CNP        potassium chloride (KLOR-CON M) extended release tablet 40 mEq  40 mEq Oral PRN Zheng Ramriez APRN - CNP        Or    potassium bicarb-citric acid (EFFER-K) effervescent tablet 40 mEq  40 mEq Oral PRN Camuso, Seven, APRN - CNP        Or    potassium chloride 10 mEq/100 mL IVPB (Peripheral Line)  10 mEq IntraVENous PRN Zheng Ramirez APRN - CNP        magnesium sulfate 2000 mg in 50 mL IVPB premix  2,000 mg IntraVENous PRN Zheng Ramirez APRN - CNP        enoxaparin (LOVENOX) injection 40 mg  40

## 2025-05-20 NOTE — CONSULTS
CTS Consult    Patient name: Jerome Steel    Reason for consult: Endocarditis    Primary Care Physician: Genet Kraus DO    Date of service: 5/20/2025    Chief Complaint: Positive blood cultures    HPI: 64 year old known to me in the sense that I did a pump assist CABG x 2 on him on 4/17. At the time we recongnized he had AV disease but this was left alone as he has significant ascending aortic calcifications so I could not cross clamp his aorta (hence the pump asssit).  He presents back with positive blood cultures and LYRIC shows an AV vegetation.  The patient currently denies CP, SOB at rest, new LE edema, N/V, F/C, rigors, chills, orthopnea, PND and syncope.      Allergies:   Allergies   Allergen Reactions    Pcn [Penicillins] Anaphylaxis       Home medications:    Current Facility-Administered Medications   Medication Dose Route Frequency Provider Last Rate Last Admin    0.9 % sodium chloride infusion   IntraVENous Continuous Theo Bland DO 75 mL/hr at 05/20/25 0721 Rate Verify at 05/20/25 0721    meropenem (MERREM) 1,000 mg in sodium chloride 0.9 % 100 mL IVPB ([id number])  1,000 mg IntraVENous Q8H Zoë Chang APRN - CNP   Stopped at 05/20/25 0842    vancomycin (VANCOCIN) 1,500 mg in sodium chloride 0.9 % 250 mL IVPB ([id number])  1,500 mg IntraVENous Q12H Aury Shetty, .7 mL/hr at 05/20/25 1101 1,500 mg at 05/20/25 1101    sulfur hexafluoride microspheres (LUMASON) 60.7-25 MG injection 2 mL  2 mL IntraVENous ONCE PRN Zoë Chang APRN - CNP        sodium chloride flush 0.9 % injection 5-40 mL  5-40 mL IntraVENous 2 times per day Zheng Ramirez APRN - CNP   10 mL at 05/20/25 0848    sodium chloride flush 0.9 % injection 5-40 mL  5-40 mL IntraVENous PRN Zheng Raimrez APRN - CNP   10 mL at 05/17/25 1147    0.9 % sodium chloride infusion   IntraVENous PRN Zheng Ramirez APRN - CNP        ondansetron (ZOFRAN-ODT) disintegrating tablet 4 mg  4 mg Oral Q8H PRN Zheng Ramirez APRN -

## 2025-05-21 ENCOUNTER — APPOINTMENT (OUTPATIENT)
Dept: GENERAL RADIOLOGY | Age: 65
DRG: 616 | End: 2025-05-21
Payer: COMMERCIAL

## 2025-05-21 LAB
CREAT SERPL-MCNC: 0.6 MG/DL (ref 0.7–1.2)
DATE LAST DOSE: ABNORMAL
GFR, ESTIMATED: >90 ML/MIN/1.73M2
GLUCOSE BLD-MCNC: 151 MG/DL (ref 74–99)
GLUCOSE BLD-MCNC: 155 MG/DL (ref 74–99)
GLUCOSE BLD-MCNC: 170 MG/DL (ref 74–99)
GLUCOSE BLD-MCNC: 182 MG/DL (ref 74–99)
SURGICAL PATHOLOGY REPORT: NORMAL
TME LAST DOSE: ABNORMAL H
VANCOMYCIN DOSE: ABNORMAL MG
VANCOMYCIN TROUGH SERPL-MCNC: 25.3 UG/ML (ref 5–16)

## 2025-05-21 PROCEDURE — 2580000003 HC RX 258: Performed by: NURSE PRACTITIONER

## 2025-05-21 PROCEDURE — 6370000000 HC RX 637 (ALT 250 FOR IP)

## 2025-05-21 PROCEDURE — 99232 SBSQ HOSP IP/OBS MODERATE 35: CPT | Performed by: STUDENT IN AN ORGANIZED HEALTH CARE EDUCATION/TRAINING PROGRAM

## 2025-05-21 PROCEDURE — 2500000003 HC RX 250 WO HCPCS

## 2025-05-21 PROCEDURE — 2580000003 HC RX 258

## 2025-05-21 PROCEDURE — 80202 ASSAY OF VANCOMYCIN: CPT

## 2025-05-21 PROCEDURE — 6360000002 HC RX W HCPCS

## 2025-05-21 PROCEDURE — 82565 ASSAY OF CREATININE: CPT

## 2025-05-21 PROCEDURE — 36415 COLL VENOUS BLD VENIPUNCTURE: CPT

## 2025-05-21 PROCEDURE — 6360000002 HC RX W HCPCS: Performed by: NURSE PRACTITIONER

## 2025-05-21 PROCEDURE — 82962 GLUCOSE BLOOD TEST: CPT

## 2025-05-21 PROCEDURE — 2060000000 HC ICU INTERMEDIATE R&B

## 2025-05-21 RX ORDER — OXYCODONE AND ACETAMINOPHEN 5; 325 MG/1; MG/1
1 TABLET ORAL EVERY 4 HOURS PRN
Refills: 0 | Status: DISCONTINUED | OUTPATIENT
Start: 2025-05-21 | End: 2025-05-23 | Stop reason: HOSPADM

## 2025-05-21 RX ORDER — OXYCODONE HYDROCHLORIDE 5 MG/1
5 TABLET ORAL EVERY 4 HOURS PRN
Refills: 0 | Status: DISCONTINUED | OUTPATIENT
Start: 2025-05-21 | End: 2025-05-21

## 2025-05-21 RX ORDER — OXYCODONE HYDROCHLORIDE 10 MG/1
10 TABLET ORAL EVERY 4 HOURS PRN
Refills: 0 | Status: DISCONTINUED | OUTPATIENT
Start: 2025-05-21 | End: 2025-05-21

## 2025-05-21 RX ADMIN — INSULIN LISPRO 1 UNITS: 100 INJECTION, SOLUTION INTRAVENOUS; SUBCUTANEOUS at 16:27

## 2025-05-21 RX ADMIN — OXYCODONE HYDROCHLORIDE 5 MG: 5 TABLET ORAL at 01:18

## 2025-05-21 RX ADMIN — PANTOPRAZOLE SODIUM 40 MG: 40 TABLET, DELAYED RELEASE ORAL at 09:03

## 2025-05-21 RX ADMIN — FAMOTIDINE 40 MG: 20 TABLET, FILM COATED ORAL at 09:04

## 2025-05-21 RX ADMIN — OXYCODONE AND ACETAMINOPHEN 1 TABLET: 5; 325 TABLET ORAL at 18:07

## 2025-05-21 RX ADMIN — MEROPENEM 1000 MG: 1 INJECTION INTRAVENOUS at 17:56

## 2025-05-21 RX ADMIN — METOPROLOL SUCCINATE 12.5 MG: 25 TABLET, FILM COATED, EXTENDED RELEASE ORAL at 09:02

## 2025-05-21 RX ADMIN — SODIUM CHLORIDE, PRESERVATIVE FREE 10 ML: 5 INJECTION INTRAVENOUS at 09:06

## 2025-05-21 RX ADMIN — CLOPIDOGREL BISULFATE 75 MG: 75 TABLET, FILM COATED ORAL at 09:06

## 2025-05-21 RX ADMIN — OXYCODONE AND ACETAMINOPHEN 1 TABLET: 5; 325 TABLET ORAL at 21:43

## 2025-05-21 RX ADMIN — ATORVASTATIN CALCIUM 40 MG: 40 TABLET, FILM COATED ORAL at 21:35

## 2025-05-21 RX ADMIN — INSULIN GLARGINE 5 UNITS: 100 INJECTION, SOLUTION SUBCUTANEOUS at 21:36

## 2025-05-21 RX ADMIN — OXYCODONE 5 MG: 5 TABLET ORAL at 13:32

## 2025-05-21 RX ADMIN — SODIUM CHLORIDE, PRESERVATIVE FREE 10 ML: 5 INJECTION INTRAVENOUS at 20:43

## 2025-05-21 RX ADMIN — HYDROMORPHONE HYDROCHLORIDE 0.5 MG: 1 INJECTION, SOLUTION INTRAMUSCULAR; INTRAVENOUS; SUBCUTANEOUS at 16:26

## 2025-05-21 RX ADMIN — FOLIC ACID 1000 MCG: 1 TABLET ORAL at 09:06

## 2025-05-21 RX ADMIN — VANCOMYCIN HYDROCHLORIDE 1000 MG: 1 INJECTION, POWDER, LYOPHILIZED, FOR SOLUTION INTRAVENOUS at 17:59

## 2025-05-21 RX ADMIN — SODIUM CHLORIDE, PRESERVATIVE FREE 10 ML: 5 INJECTION INTRAVENOUS at 21:36

## 2025-05-21 RX ADMIN — OXYCODONE HYDROCHLORIDE 5 MG: 5 TABLET ORAL at 05:19

## 2025-05-21 RX ADMIN — GABAPENTIN 300 MG: 300 CAPSULE ORAL at 13:32

## 2025-05-21 RX ADMIN — HYDROMORPHONE HYDROCHLORIDE: 10 INJECTION, SOLUTION INTRAMUSCULAR; INTRAVENOUS; SUBCUTANEOUS at 07:55

## 2025-05-21 RX ADMIN — SODIUM CHLORIDE, PRESERVATIVE FREE 10 ML: 5 INJECTION INTRAVENOUS at 09:09

## 2025-05-21 RX ADMIN — GABAPENTIN 300 MG: 300 CAPSULE ORAL at 09:04

## 2025-05-21 RX ADMIN — Medication 500 MG: at 21:35

## 2025-05-21 RX ADMIN — FLUCONAZOLE 400 MG: 150 TABLET ORAL at 09:06

## 2025-05-21 RX ADMIN — ASPIRIN 81 MG: 81 TABLET, COATED ORAL at 09:02

## 2025-05-21 RX ADMIN — MEROPENEM 1000 MG: 1 INJECTION INTRAVENOUS at 10:03

## 2025-05-21 RX ADMIN — INSULIN LISPRO 1 UNITS: 100 INJECTION, SOLUTION INTRAVENOUS; SUBCUTANEOUS at 11:19

## 2025-05-21 RX ADMIN — INSULIN LISPRO 1 UNITS: 100 INJECTION, SOLUTION INTRAVENOUS; SUBCUTANEOUS at 09:23

## 2025-05-21 RX ADMIN — TAMSULOSIN HYDROCHLORIDE 0.4 MG: 0.4 CAPSULE ORAL at 21:35

## 2025-05-21 RX ADMIN — OXYCODONE HYDROCHLORIDE 10 MG: 10 TABLET ORAL at 09:21

## 2025-05-21 RX ADMIN — HYDROMORPHONE HYDROCHLORIDE 0.5 MG: 1 INJECTION, SOLUTION INTRAMUSCULAR; INTRAVENOUS; SUBCUTANEOUS at 20:42

## 2025-05-21 RX ADMIN — GABAPENTIN 300 MG: 300 CAPSULE ORAL at 21:35

## 2025-05-21 RX ADMIN — Medication 500 MG: at 09:06

## 2025-05-21 RX ADMIN — HEPARIN SODIUM 5000 UNITS: 5000 INJECTION INTRAVENOUS; SUBCUTANEOUS at 21:36

## 2025-05-21 RX ADMIN — INSULIN LISPRO 1 UNITS: 100 INJECTION, SOLUTION INTRAVENOUS; SUBCUTANEOUS at 21:37

## 2025-05-21 RX ADMIN — HEPARIN SODIUM 5000 UNITS: 5000 INJECTION INTRAVENOUS; SUBCUTANEOUS at 09:18

## 2025-05-21 ASSESSMENT — PAIN DESCRIPTION - PAIN TYPE
TYPE: SURGICAL PAIN

## 2025-05-21 ASSESSMENT — PAIN DESCRIPTION - ORIENTATION
ORIENTATION: RIGHT
ORIENTATION: LEFT;LOWER
ORIENTATION: RIGHT

## 2025-05-21 ASSESSMENT — PAIN DESCRIPTION - FREQUENCY
FREQUENCY: CONTINUOUS

## 2025-05-21 ASSESSMENT — PAIN DESCRIPTION - ONSET
ONSET: ON-GOING

## 2025-05-21 ASSESSMENT — PAIN DESCRIPTION - LOCATION
LOCATION: LEG
LOCATION: LEG;KNEE
LOCATION: LEG
LOCATION: BACK;LEG;HIP
LOCATION: LEG
LOCATION: BACK;HIP;SHOULDER
LOCATION: LEG

## 2025-05-21 ASSESSMENT — PAIN DESCRIPTION - DESCRIPTORS
DESCRIPTORS: ACHING;DISCOMFORT;SORE
DESCRIPTORS: ACHING;DISCOMFORT;THROBBING

## 2025-05-21 ASSESSMENT — PAIN SCALES - GENERAL
PAINLEVEL_OUTOF10: 8
PAINLEVEL_OUTOF10: 9
PAINLEVEL_OUTOF10: 10
PAINLEVEL_OUTOF10: 9
PAINLEVEL_OUTOF10: 3
PAINLEVEL_OUTOF10: 9
PAINLEVEL_OUTOF10: 7
PAINLEVEL_OUTOF10: 0
PAINLEVEL_OUTOF10: 8
PAINLEVEL_OUTOF10: 0
PAINLEVEL_OUTOF10: 9
PAINLEVEL_OUTOF10: 9
PAINLEVEL_OUTOF10: 8

## 2025-05-21 ASSESSMENT — PAIN SCALES - WONG BAKER
WONGBAKER_NUMERICALRESPONSE: NO HURT
WONGBAKER_NUMERICALRESPONSE: NO HURT

## 2025-05-21 ASSESSMENT — PAIN - FUNCTIONAL ASSESSMENT

## 2025-05-21 NOTE — CARE COORDINATION
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 Precert for [facility] good through 5/23, will need new one if not discharged Friday. Re-referral to [facility] d/t change of IV antibiotics, will await assessment. Pt only has 12 skilled days left for [facility], [facility] will take until [facility] are up. Envelope and ambulance form in soft chart.Aretha Mireles, MSW, LSW

## 2025-05-22 LAB
ANION GAP SERPL CALCULATED.3IONS-SCNC: 10 MMOL/L (ref 7–16)
BASOPHILS # BLD: 0.05 K/UL (ref 0–0.2)
BASOPHILS NFR BLD: 0 % (ref 0–2)
BUN SERPL-MCNC: 8 MG/DL (ref 8–23)
CALCIUM SERPL-MCNC: 8.6 MG/DL (ref 8.8–10.2)
CHLORIDE SERPL-SCNC: 102 MMOL/L (ref 98–107)
CO2 SERPL-SCNC: 22 MMOL/L (ref 22–29)
CREAT SERPL-MCNC: 0.6 MG/DL (ref 0.7–1.2)
EOSINOPHIL # BLD: 0.5 K/UL (ref 0.05–0.5)
EOSINOPHILS RELATIVE PERCENT: 4 % (ref 0–6)
ERYTHROCYTE [DISTWIDTH] IN BLOOD BY AUTOMATED COUNT: 15.9 % (ref 11.5–15)
GFR, ESTIMATED: >90 ML/MIN/1.73M2
GLUCOSE BLD-MCNC: 113 MG/DL (ref 74–99)
GLUCOSE BLD-MCNC: 143 MG/DL (ref 74–99)
GLUCOSE BLD-MCNC: 174 MG/DL (ref 74–99)
GLUCOSE BLD-MCNC: 175 MG/DL (ref 74–99)
GLUCOSE SERPL-MCNC: 115 MG/DL (ref 74–99)
HCT VFR BLD AUTO: 21.9 % (ref 37–54)
HCT VFR BLD AUTO: 28 % (ref 37–54)
HGB BLD-MCNC: 6.8 G/DL (ref 12.5–16.5)
HGB BLD-MCNC: 9 G/DL (ref 12.5–16.5)
IMM GRANULOCYTES # BLD AUTO: 0.08 K/UL (ref 0–0.58)
IMM GRANULOCYTES NFR BLD: 1 % (ref 0–5)
LYMPHOCYTES NFR BLD: 1.91 K/UL (ref 1.5–4)
LYMPHOCYTES RELATIVE PERCENT: 14 % (ref 20–42)
MCH RBC QN AUTO: 27.8 PG (ref 26–35)
MCHC RBC AUTO-ENTMCNC: 31.1 G/DL (ref 32–34.5)
MCV RBC AUTO: 89.4 FL (ref 80–99.9)
MICROORGANISM SPEC CULT: ABNORMAL
MICROORGANISM/AGENT SPEC: ABNORMAL
MONOCYTES NFR BLD: 1 K/UL (ref 0.1–0.95)
MONOCYTES NFR BLD: 8 % (ref 2–12)
NEUTROPHILS NFR BLD: 74 % (ref 43–80)
NEUTS SEG NFR BLD: 9.88 K/UL (ref 1.8–7.3)
PLATELET CONFIRMATION: NORMAL
PLATELET, FLUORESCENCE: 294 K/UL (ref 130–450)
PMV BLD AUTO: 10.5 FL (ref 7–12)
POTASSIUM SERPL-SCNC: 3.9 MMOL/L (ref 3.5–5.1)
RBC # BLD AUTO: 2.45 M/UL (ref 3.8–5.8)
RBC # BLD: ABNORMAL 10*6/UL
SERVICE CMNT-IMP: ABNORMAL
SODIUM SERPL-SCNC: 134 MMOL/L (ref 136–145)
SPECIMEN DESCRIPTION: ABNORMAL
WBC OTHER # BLD: 13.4 K/UL (ref 4.5–11.5)

## 2025-05-22 PROCEDURE — 2500000003 HC RX 250 WO HCPCS

## 2025-05-22 PROCEDURE — 6360000002 HC RX W HCPCS

## 2025-05-22 PROCEDURE — 2500000003 HC RX 250 WO HCPCS: Performed by: NURSE PRACTITIONER

## 2025-05-22 PROCEDURE — 36569 INSJ PICC 5 YR+ W/O IMAGING: CPT

## 2025-05-22 PROCEDURE — 2580000003 HC RX 258

## 2025-05-22 PROCEDURE — 2060000000 HC ICU INTERMEDIATE R&B

## 2025-05-22 PROCEDURE — 36415 COLL VENOUS BLD VENIPUNCTURE: CPT

## 2025-05-22 PROCEDURE — 30233N1 TRANSFUSION OF NONAUTOLOGOUS RED BLOOD CELLS INTO PERIPHERAL VEIN, PERCUTANEOUS APPROACH: ICD-10-PCS | Performed by: STUDENT IN AN ORGANIZED HEALTH CARE EDUCATION/TRAINING PROGRAM

## 2025-05-22 PROCEDURE — 85018 HEMOGLOBIN: CPT

## 2025-05-22 PROCEDURE — 82962 GLUCOSE BLOOD TEST: CPT

## 2025-05-22 PROCEDURE — 85025 COMPLETE CBC W/AUTO DIFF WBC: CPT

## 2025-05-22 PROCEDURE — 6360000002 HC RX W HCPCS: Performed by: NURSE PRACTITIONER

## 2025-05-22 PROCEDURE — 6370000000 HC RX 637 (ALT 250 FOR IP)

## 2025-05-22 PROCEDURE — 80048 BASIC METABOLIC PNL TOTAL CA: CPT

## 2025-05-22 PROCEDURE — 85014 HEMATOCRIT: CPT

## 2025-05-22 PROCEDURE — 2580000003 HC RX 258: Performed by: NURSE PRACTITIONER

## 2025-05-22 PROCEDURE — C1751 CATH, INF, PER/CENT/MIDLINE: HCPCS

## 2025-05-22 PROCEDURE — 36430 TRANSFUSION BLD/BLD COMPNT: CPT

## 2025-05-22 PROCEDURE — P9016 RBC LEUKOCYTES REDUCED: HCPCS

## 2025-05-22 PROCEDURE — 76937 US GUIDE VASCULAR ACCESS: CPT

## 2025-05-22 PROCEDURE — 99232 SBSQ HOSP IP/OBS MODERATE 35: CPT | Performed by: STUDENT IN AN ORGANIZED HEALTH CARE EDUCATION/TRAINING PROGRAM

## 2025-05-22 PROCEDURE — 02HV33Z INSERTION OF INFUSION DEVICE INTO SUPERIOR VENA CAVA, PERCUTANEOUS APPROACH: ICD-10-PCS | Performed by: STUDENT IN AN ORGANIZED HEALTH CARE EDUCATION/TRAINING PROGRAM

## 2025-05-22 RX ORDER — SODIUM CHLORIDE 0.9 % (FLUSH) 0.9 %
5-40 SYRINGE (ML) INJECTION PRN
Status: DISCONTINUED | OUTPATIENT
Start: 2025-05-22 | End: 2025-05-23 | Stop reason: HOSPADM

## 2025-05-22 RX ORDER — SODIUM CHLORIDE 0.9 % (FLUSH) 0.9 %
5-40 SYRINGE (ML) INJECTION EVERY 12 HOURS SCHEDULED
Status: DISCONTINUED | OUTPATIENT
Start: 2025-05-22 | End: 2025-05-23 | Stop reason: HOSPADM

## 2025-05-22 RX ORDER — OXYCODONE AND ACETAMINOPHEN 5; 325 MG/1; MG/1
1 TABLET ORAL EVERY 4 HOURS PRN
Qty: 10 TABLET | Refills: 0 | Status: SHIPPED | OUTPATIENT
Start: 2025-05-22 | End: 2025-05-27

## 2025-05-22 RX ORDER — CALCIUM CARBONATE 500 MG/1
500 TABLET, CHEWABLE ORAL 3 TIMES DAILY PRN
DISCHARGE
Start: 2025-05-22 | End: 2025-06-21

## 2025-05-22 RX ORDER — LIDOCAINE HYDROCHLORIDE 10 MG/ML
50 INJECTION, SOLUTION EPIDURAL; INFILTRATION; INTRACAUDAL; PERINEURAL ONCE
Status: DISCONTINUED | OUTPATIENT
Start: 2025-05-22 | End: 2025-05-23 | Stop reason: HOSPADM

## 2025-05-22 RX ORDER — GABAPENTIN 300 MG/1
300 CAPSULE ORAL 3 TIMES DAILY
DISCHARGE
Start: 2025-05-22 | End: 2025-06-21

## 2025-05-22 RX ORDER — POLYETHYLENE GLYCOL 3350 17 G/17G
17 POWDER, FOR SOLUTION ORAL DAILY PRN
DISCHARGE
Start: 2025-05-22 | End: 2025-07-23

## 2025-05-22 RX ORDER — SODIUM CHLORIDE 9 MG/ML
INJECTION, SOLUTION INTRAVENOUS PRN
Status: DISCONTINUED | OUTPATIENT
Start: 2025-05-22 | End: 2025-05-23 | Stop reason: HOSPADM

## 2025-05-22 RX ORDER — BENZONATATE 100 MG/1
100 CAPSULE ORAL 3 TIMES DAILY PRN
DISCHARGE
Start: 2025-05-22 | End: 2025-05-29

## 2025-05-22 RX ADMIN — SODIUM CHLORIDE, PRESERVATIVE FREE 10 ML: 5 INJECTION INTRAVENOUS at 21:55

## 2025-05-22 RX ADMIN — HYDROMORPHONE HYDROCHLORIDE 0.5 MG: 1 INJECTION, SOLUTION INTRAMUSCULAR; INTRAVENOUS; SUBCUTANEOUS at 01:03

## 2025-05-22 RX ADMIN — ATORVASTATIN CALCIUM 40 MG: 40 TABLET, FILM COATED ORAL at 21:54

## 2025-05-22 RX ADMIN — FAMOTIDINE 40 MG: 20 TABLET, FILM COATED ORAL at 09:39

## 2025-05-22 RX ADMIN — FOLIC ACID 1000 MCG: 1 TABLET ORAL at 09:40

## 2025-05-22 RX ADMIN — HYDROMORPHONE HYDROCHLORIDE 0.5 MG: 1 INJECTION, SOLUTION INTRAMUSCULAR; INTRAVENOUS; SUBCUTANEOUS at 17:50

## 2025-05-22 RX ADMIN — SODIUM CHLORIDE, PRESERVATIVE FREE 10 ML: 5 INJECTION INTRAVENOUS at 22:03

## 2025-05-22 RX ADMIN — MEROPENEM 1000 MG: 1 INJECTION INTRAVENOUS at 02:17

## 2025-05-22 RX ADMIN — HEPARIN SODIUM 5000 UNITS: 5000 INJECTION INTRAVENOUS; SUBCUTANEOUS at 13:43

## 2025-05-22 RX ADMIN — INSULIN GLARGINE 5 UNITS: 100 INJECTION, SOLUTION SUBCUTANEOUS at 21:55

## 2025-05-22 RX ADMIN — WATER 2000 MG: 1 INJECTION INTRAMUSCULAR; INTRAVENOUS; SUBCUTANEOUS at 10:01

## 2025-05-22 RX ADMIN — HYDROMORPHONE HYDROCHLORIDE 0.5 MG: 1 INJECTION, SOLUTION INTRAMUSCULAR; INTRAVENOUS; SUBCUTANEOUS at 13:44

## 2025-05-22 RX ADMIN — SODIUM CHLORIDE, PRESERVATIVE FREE 10 ML: 5 INJECTION INTRAVENOUS at 09:42

## 2025-05-22 RX ADMIN — INSULIN LISPRO 1 UNITS: 100 INJECTION, SOLUTION INTRAVENOUS; SUBCUTANEOUS at 16:33

## 2025-05-22 RX ADMIN — PANTOPRAZOLE SODIUM 40 MG: 40 TABLET, DELAYED RELEASE ORAL at 09:39

## 2025-05-22 RX ADMIN — CLOPIDOGREL BISULFATE 75 MG: 75 TABLET, FILM COATED ORAL at 09:40

## 2025-05-22 RX ADMIN — GABAPENTIN 300 MG: 300 CAPSULE ORAL at 09:40

## 2025-05-22 RX ADMIN — ASPIRIN 81 MG: 81 TABLET, COATED ORAL at 09:40

## 2025-05-22 RX ADMIN — TAMSULOSIN HYDROCHLORIDE 0.4 MG: 0.4 CAPSULE ORAL at 21:54

## 2025-05-22 RX ADMIN — OXYCODONE AND ACETAMINOPHEN 1 TABLET: 5; 325 TABLET ORAL at 19:31

## 2025-05-22 RX ADMIN — OXYCODONE AND ACETAMINOPHEN 1 TABLET: 5; 325 TABLET ORAL at 11:16

## 2025-05-22 RX ADMIN — OXYCODONE AND ACETAMINOPHEN 1 TABLET: 5; 325 TABLET ORAL at 02:17

## 2025-05-22 RX ADMIN — HYDROMORPHONE HYDROCHLORIDE 0.5 MG: 1 INJECTION, SOLUTION INTRAMUSCULAR; INTRAVENOUS; SUBCUTANEOUS at 21:55

## 2025-05-22 RX ADMIN — Medication 500 MG: at 09:39

## 2025-05-22 RX ADMIN — METOPROLOL SUCCINATE 12.5 MG: 25 TABLET, FILM COATED, EXTENDED RELEASE ORAL at 09:39

## 2025-05-22 RX ADMIN — OXYCODONE AND ACETAMINOPHEN 1 TABLET: 5; 325 TABLET ORAL at 07:16

## 2025-05-22 RX ADMIN — HYDROMORPHONE HYDROCHLORIDE 0.5 MG: 1 INJECTION, SOLUTION INTRAMUSCULAR; INTRAVENOUS; SUBCUTANEOUS at 09:42

## 2025-05-22 RX ADMIN — HYDROMORPHONE HYDROCHLORIDE 0.5 MG: 1 INJECTION, SOLUTION INTRAMUSCULAR; INTRAVENOUS; SUBCUTANEOUS at 05:31

## 2025-05-22 RX ADMIN — HEPARIN SODIUM 5000 UNITS: 5000 INJECTION INTRAVENOUS; SUBCUTANEOUS at 05:31

## 2025-05-22 RX ADMIN — HEPARIN SODIUM 5000 UNITS: 5000 INJECTION INTRAVENOUS; SUBCUTANEOUS at 21:54

## 2025-05-22 RX ADMIN — GABAPENTIN 300 MG: 300 CAPSULE ORAL at 13:42

## 2025-05-22 RX ADMIN — INSULIN LISPRO 1 UNITS: 100 INJECTION, SOLUTION INTRAVENOUS; SUBCUTANEOUS at 22:03

## 2025-05-22 RX ADMIN — Medication 500 MG: at 21:54

## 2025-05-22 RX ADMIN — VANCOMYCIN HYDROCHLORIDE 1000 MG: 1 INJECTION, POWDER, LYOPHILIZED, FOR SOLUTION INTRAVENOUS at 05:29

## 2025-05-22 RX ADMIN — OXYCODONE AND ACETAMINOPHEN 1 TABLET: 5; 325 TABLET ORAL at 15:21

## 2025-05-22 RX ADMIN — GABAPENTIN 300 MG: 300 CAPSULE ORAL at 21:54

## 2025-05-22 ASSESSMENT — PAIN SCALES - GENERAL
PAINLEVEL_OUTOF10: 8
PAINLEVEL_OUTOF10: 9
PAINLEVEL_OUTOF10: 8
PAINLEVEL_OUTOF10: 0
PAINLEVEL_OUTOF10: 8
PAINLEVEL_OUTOF10: 9
PAINLEVEL_OUTOF10: 0
PAINLEVEL_OUTOF10: 8
PAINLEVEL_OUTOF10: 0
PAINLEVEL_OUTOF10: 10
PAINLEVEL_OUTOF10: 0
PAINLEVEL_OUTOF10: 8
PAINLEVEL_OUTOF10: 9
PAINLEVEL_OUTOF10: 8
PAINLEVEL_OUTOF10: 10

## 2025-05-22 ASSESSMENT — PAIN DESCRIPTION - DESCRIPTORS
DESCRIPTORS: ACHING;THROBBING;BURNING
DESCRIPTORS: ACHING;DISCOMFORT;SORE
DESCRIPTORS: ACHING;THROBBING;DISCOMFORT
DESCRIPTORS: ACHING;DISCOMFORT;SORE
DESCRIPTORS: ACHING;DISCOMFORT;SORE
DESCRIPTORS: BURNING;THROBBING
DESCRIPTORS: ACHING;DISCOMFORT;SORE
DESCRIPTORS: ACHING;THROBBING;DISCOMFORT
DESCRIPTORS: THROBBING;SORE;BURNING
DESCRIPTORS: ACHING;DISCOMFORT;SORE
DESCRIPTORS: ACHING;CRUSHING;HEAVINESS

## 2025-05-22 ASSESSMENT — PAIN DESCRIPTION - ORIENTATION
ORIENTATION: RIGHT

## 2025-05-22 ASSESSMENT — PAIN SCALES - WONG BAKER
WONGBAKER_NUMERICALRESPONSE: NO HURT

## 2025-05-22 ASSESSMENT — PAIN - FUNCTIONAL ASSESSMENT
PAIN_FUNCTIONAL_ASSESSMENT: ACTIVITIES ARE NOT PREVENTED

## 2025-05-22 ASSESSMENT — PAIN DESCRIPTION - LOCATION
LOCATION: LEG;HIP
LOCATION: LEG
LOCATION: OTHER (COMMENT)
LOCATION: LEG;BACK
LOCATION: LEG
LOCATION: LEG;BACK

## 2025-05-22 ASSESSMENT — PAIN DESCRIPTION - PAIN TYPE
TYPE: SURGICAL PAIN

## 2025-05-22 ASSESSMENT — PAIN DESCRIPTION - ONSET
ONSET: ON-GOING

## 2025-05-22 ASSESSMENT — PAIN DESCRIPTION - FREQUENCY
FREQUENCY: CONTINUOUS

## 2025-05-22 NOTE — CARE COORDINATION
Reviewed chart, had spoke with My at [facility], she is meeting with pt this morning about medicaid. My met with pt, pt is agreeable for medicaid nilesh and signed application. Notified ID, will need order for [facility]. Precert is good through 5/23, [facility] can take 5/23, had to fill bed(pt was not a bed hold). Envelope and ambulance form in soft chart.Aretha Mireles, MSW, LSW

## 2025-05-22 NOTE — CONSENT
Informed Consent for Blood Component Transfusion Note    I have discussed with the patient the rationale for blood component transfusion; its benefits in treating or preventing fatigue, organ damage, or death; and its risk which includes mild transfusion reactions, rare risk of blood borne infection, or more serious but rare reactions. I have discussed the alternatives to transfusion, including the risk and consequences of not receiving transfusion. The patient had an opportunity to ask questions and had agreed to proceed with transfusion of blood components.    Electronically signed by Theo Bland DO on 5/22/25 at 6:54 AM EDT

## 2025-05-23 VITALS
WEIGHT: 155.65 LBS | SYSTOLIC BLOOD PRESSURE: 140 MMHG | DIASTOLIC BLOOD PRESSURE: 75 MMHG | OXYGEN SATURATION: 97 % | BODY MASS INDEX: 21.79 KG/M2 | HEART RATE: 74 BPM | TEMPERATURE: 98.4 F | RESPIRATION RATE: 16 BRPM | HEIGHT: 71 IN

## 2025-05-23 LAB
ABO/RH: NORMAL
ANION GAP SERPL CALCULATED.3IONS-SCNC: 11 MMOL/L (ref 7–16)
ANTIBODY SCREEN: NEGATIVE
ARM BAND NUMBER: NORMAL
BASOPHILS # BLD: 0.06 K/UL (ref 0–0.2)
BASOPHILS NFR BLD: 1 % (ref 0–2)
BLOOD BANK BLOOD PRODUCT EXPIRATION DATE: NORMAL
BLOOD BANK DISPENSE STATUS: NORMAL
BLOOD BANK ISBT PRODUCT BLOOD TYPE: 5100
BLOOD BANK PRODUCT CODE: NORMAL
BLOOD BANK SAMPLE EXPIRATION: NORMAL
BLOOD BANK UNIT TYPE AND RH: NORMAL
BPU ID: NORMAL
BUN SERPL-MCNC: 8 MG/DL (ref 8–23)
CALCIUM SERPL-MCNC: 8.9 MG/DL (ref 8.8–10.2)
CHLORIDE SERPL-SCNC: 100 MMOL/L (ref 98–107)
CO2 SERPL-SCNC: 24 MMOL/L (ref 22–29)
COMPONENT: NORMAL
CREAT SERPL-MCNC: 0.6 MG/DL (ref 0.7–1.2)
CROSSMATCH RESULT: NORMAL
EOSINOPHIL # BLD: 0.5 K/UL (ref 0.05–0.5)
EOSINOPHILS RELATIVE PERCENT: 5 % (ref 0–6)
ERYTHROCYTE [DISTWIDTH] IN BLOOD BY AUTOMATED COUNT: 15.9 % (ref 11.5–15)
GFR, ESTIMATED: >90 ML/MIN/1.73M2
GLUCOSE BLD-MCNC: 116 MG/DL (ref 74–99)
GLUCOSE BLD-MCNC: 230 MG/DL (ref 74–99)
GLUCOSE SERPL-MCNC: 109 MG/DL (ref 74–99)
HCT VFR BLD AUTO: 26.2 % (ref 37–54)
HGB BLD-MCNC: 8.7 G/DL (ref 12.5–16.5)
IMM GRANULOCYTES # BLD AUTO: 0.07 K/UL (ref 0–0.58)
IMM GRANULOCYTES NFR BLD: 1 % (ref 0–5)
LYMPHOCYTES NFR BLD: 1.39 K/UL (ref 1.5–4)
LYMPHOCYTES RELATIVE PERCENT: 14 % (ref 20–42)
MCH RBC QN AUTO: 28.1 PG (ref 26–35)
MCHC RBC AUTO-ENTMCNC: 33.2 G/DL (ref 32–34.5)
MCV RBC AUTO: 84.5 FL (ref 80–99.9)
MICROORGANISM SPEC CULT: NORMAL
MICROORGANISM SPEC CULT: NORMAL
MICROORGANISM/AGENT SPEC: NORMAL
MICROORGANISM/AGENT SPEC: NORMAL
MONOCYTES NFR BLD: 0.71 K/UL (ref 0.1–0.95)
MONOCYTES NFR BLD: 7 % (ref 2–12)
NEUTROPHILS NFR BLD: 73 % (ref 43–80)
NEUTS SEG NFR BLD: 7.55 K/UL (ref 1.8–7.3)
PLATELET # BLD AUTO: 375 K/UL (ref 130–450)
PMV BLD AUTO: 9.8 FL (ref 7–12)
POTASSIUM SERPL-SCNC: 3.8 MMOL/L (ref 3.5–5.1)
RBC # BLD AUTO: 3.1 M/UL (ref 3.8–5.8)
SERVICE CMNT-IMP: NORMAL
SERVICE CMNT-IMP: NORMAL
SODIUM SERPL-SCNC: 135 MMOL/L (ref 136–145)
SPECIMEN DESCRIPTION: NORMAL
SPECIMEN DESCRIPTION: NORMAL
SURGICAL PATHOLOGY REPORT: NORMAL
TRANSFUSION STATUS: NORMAL
UNIT DIVISION: 0
UNIT ISSUE DATE/TIME: NORMAL
WBC OTHER # BLD: 10.3 K/UL (ref 4.5–11.5)

## 2025-05-23 PROCEDURE — 80048 BASIC METABOLIC PNL TOTAL CA: CPT

## 2025-05-23 PROCEDURE — 6360000002 HC RX W HCPCS: Performed by: NURSE PRACTITIONER

## 2025-05-23 PROCEDURE — 85025 COMPLETE CBC W/AUTO DIFF WBC: CPT

## 2025-05-23 PROCEDURE — 6370000000 HC RX 637 (ALT 250 FOR IP)

## 2025-05-23 PROCEDURE — 36592 COLLECT BLOOD FROM PICC: CPT

## 2025-05-23 PROCEDURE — 6360000002 HC RX W HCPCS

## 2025-05-23 PROCEDURE — 82962 GLUCOSE BLOOD TEST: CPT

## 2025-05-23 PROCEDURE — 2500000003 HC RX 250 WO HCPCS: Performed by: NURSE PRACTITIONER

## 2025-05-23 PROCEDURE — 99239 HOSP IP/OBS DSCHRG MGMT >30: CPT | Performed by: STUDENT IN AN ORGANIZED HEALTH CARE EDUCATION/TRAINING PROGRAM

## 2025-05-23 RX ADMIN — HYDROMORPHONE HYDROCHLORIDE 0.25 MG: 1 INJECTION, SOLUTION INTRAMUSCULAR; INTRAVENOUS; SUBCUTANEOUS at 14:19

## 2025-05-23 RX ADMIN — OXYCODONE AND ACETAMINOPHEN 1 TABLET: 5; 325 TABLET ORAL at 12:06

## 2025-05-23 RX ADMIN — GABAPENTIN 300 MG: 300 CAPSULE ORAL at 08:36

## 2025-05-23 RX ADMIN — PANTOPRAZOLE SODIUM 40 MG: 40 TABLET, DELAYED RELEASE ORAL at 08:36

## 2025-05-23 RX ADMIN — Medication 1 DROP: at 08:36

## 2025-05-23 RX ADMIN — INSULIN LISPRO 2 UNITS: 100 INJECTION, SOLUTION INTRAVENOUS; SUBCUTANEOUS at 12:24

## 2025-05-23 RX ADMIN — HEPARIN SODIUM 5000 UNITS: 5000 INJECTION INTRAVENOUS; SUBCUTANEOUS at 14:17

## 2025-05-23 RX ADMIN — ASPIRIN 81 MG: 81 TABLET, COATED ORAL at 08:36

## 2025-05-23 RX ADMIN — OXYCODONE AND ACETAMINOPHEN 1 TABLET: 5; 325 TABLET ORAL at 05:04

## 2025-05-23 RX ADMIN — HEPARIN SODIUM 5000 UNITS: 5000 INJECTION INTRAVENOUS; SUBCUTANEOUS at 05:04

## 2025-05-23 RX ADMIN — Medication 500 MG: at 08:36

## 2025-05-23 RX ADMIN — HYDROMORPHONE HYDROCHLORIDE 0.5 MG: 1 INJECTION, SOLUTION INTRAMUSCULAR; INTRAVENOUS; SUBCUTANEOUS at 08:37

## 2025-05-23 RX ADMIN — FOLIC ACID 1000 MCG: 1 TABLET ORAL at 08:36

## 2025-05-23 RX ADMIN — FAMOTIDINE 40 MG: 20 TABLET, FILM COATED ORAL at 08:36

## 2025-05-23 RX ADMIN — WATER 2000 MG: 1 INJECTION INTRAMUSCULAR; INTRAVENOUS; SUBCUTANEOUS at 08:47

## 2025-05-23 RX ADMIN — CLOPIDOGREL BISULFATE 75 MG: 75 TABLET, FILM COATED ORAL at 08:36

## 2025-05-23 RX ADMIN — METOPROLOL SUCCINATE 12.5 MG: 25 TABLET, FILM COATED, EXTENDED RELEASE ORAL at 08:36

## 2025-05-23 RX ADMIN — GABAPENTIN 300 MG: 300 CAPSULE ORAL at 14:17

## 2025-05-23 ASSESSMENT — PAIN DESCRIPTION - LOCATION
LOCATION: LEG
LOCATION: OTHER (COMMENT)

## 2025-05-23 ASSESSMENT — PAIN DESCRIPTION - ORIENTATION
ORIENTATION: RIGHT
ORIENTATION: LEFT
ORIENTATION: RIGHT

## 2025-05-23 ASSESSMENT — PAIN DESCRIPTION - DESCRIPTORS
DESCRIPTORS: ACHING;DISCOMFORT;NAGGING
DESCRIPTORS: THROBBING
DESCRIPTORS: ACHING;GNAWING;DISCOMFORT
DESCRIPTORS: ACHING;DISCOMFORT;DULL

## 2025-05-23 ASSESSMENT — PAIN SCALES - GENERAL
PAINLEVEL_OUTOF10: 9
PAINLEVEL_OUTOF10: 0
PAINLEVEL_OUTOF10: 9
PAINLEVEL_OUTOF10: 0
PAINLEVEL_OUTOF10: 9

## 2025-05-23 ASSESSMENT — PAIN SCALES - WONG BAKER
WONGBAKER_NUMERICALRESPONSE: NO HURT
WONGBAKER_NUMERICALRESPONSE: NO HURT

## 2025-05-23 NOTE — CARE COORDINATION
Discharge order noted, precert good through end of day. Guille Kaufman for 3:30pm ambulance. Narcotic script written/signed will need to go with pt. Notified charge RN, bedside RN, pt, and My at [facility] of discharge/time. Envelope and ambulance form in soft chart.Aretha Mireles, MSW, LSW

## 2025-05-23 NOTE — PROGRESS NOTES
HOSPITALIST PROGRESS NOTES     ADMITTING DATE AND TIME: 5/12/2025 10:24 AM  had concerns including Blood Infection (Sent in from [facility] for positive blood cultures. Seen in ED recently for UTI. Has existing taylor catheter in. Already on IV antibiotics at rehab for endocarditis. PICC line present at triage. ).    ADMIT DX: Bacteremia [R78.81]      SUBJECTIVE:  Patient is being followed for Bacteremia [R78.81]     Patient was seen examined and evaluated  Recent lab results, charts and pertinent diagnostic imaging reviewed   Ancillary service notes reviewed   NAD    Care reviewed with nursing staff, medical and surgical specialty care, primary care and the patient's family as available.   ROS: denies fever, chills, cp, sob, n/v, HA unless stated above.      sodium chloride flush  5-40 mL IntraVENous 2 times per day    enoxaparin  40 mg SubCUTAneous Daily    ascorbic acid  500 mg Oral BID    aspirin  81 mg Oral Daily    atorvastatin  40 mg Oral Nightly    clopidogrel  75 mg Oral Daily    famotidine  40 mg Oral Daily    [Held by provider] ferrous sulfate  325 mg Oral Daily with breakfast    folic acid  1,000 mcg Oral Daily    gabapentin  300 mg Oral BID    insulin glargine  5 Units SubCUTAneous Nightly    insulin lispro  0-6 Units SubCUTAneous 4x Daily AC & HS    metoprolol succinate  12.5 mg Oral Daily    pantoprazole  40 mg Oral Daily    tamsulosin  0.4 mg Oral Nightly    cefepime  2,000 mg IntraVENous q8h     tetrahydrozoline, 1 drop, Q4H PRN  benzocaine-menthol, 1 lozenge, Q2H PRN  benzonatate, 100 mg, TID PRN  calcium carbonate, 500 mg, TID PRN  hydrALAZINE, 10 mg, Q6H PRN  melatonin, 3 mg, Nightly PRN  sodium chloride, 1 spray, PRN  sodium chloride flush, 5-40 mL, PRN  sodium chloride, , PRN  potassium chloride, 40 mEq, PRN   Or  potassium alternative oral replacement, 40 mEq, 
                                                                                                                                HOSPITALIST PROGRESS NOTES     ADMITTING DATE AND TIME: 5/12/2025 10:24 AM  had concerns including Blood Infection (Sent in from [facility] for positive blood cultures. Seen in ED recently for UTI. Has existing taylor catheter in. Already on IV antibiotics at rehab for endocarditis. PICC line present at triage. ).    ADMIT DX: Bacteremia [R78.81]      SUBJECTIVE:  Patient is being followed for Bacteremia [R78.81]     Patient was seen examined and evaluated  Recent lab results, charts and pertinent diagnostic imaging reviewed   Ancillary service notes reviewed   NAD    Care reviewed with nursing staff, medical and surgical specialty care, primary care and the patient's family as available.   ROS: denies fever, chills, cp, sob, n/v, HA unless stated above.      sodium chloride flush  5-40 mL IntraVENous 2 times per day    enoxaparin  40 mg SubCUTAneous Daily    ascorbic acid  500 mg Oral BID    aspirin  81 mg Oral Daily    atorvastatin  40 mg Oral Nightly    clopidogrel  75 mg Oral Daily    famotidine  40 mg Oral Daily    ferrous sulfate  325 mg Oral Daily with breakfast    folic acid  1,000 mcg Oral Daily    gabapentin  300 mg Oral BID    insulin glargine  5 Units SubCUTAneous Nightly    insulin lispro  0-6 Units SubCUTAneous 4x Daily AC & HS    metoprolol succinate  12.5 mg Oral Daily    pantoprazole  40 mg Oral Daily    tamsulosin  0.4 mg Oral Nightly    cefepime  2,000 mg IntraVENous q8h     benzocaine-menthol, 1 lozenge, Q2H PRN  benzonatate, 100 mg, TID PRN  calcium carbonate, 500 mg, TID PRN  hydrALAZINE, 10 mg, Q6H PRN  melatonin, 3 mg, Nightly PRN  sodium chloride, 1 spray, PRN  sodium chloride flush, 5-40 mL, PRN  sodium chloride, , PRN  potassium chloride, 40 mEq, PRN   Or  potassium alternative oral replacement, 40 mEq, PRN   Or  potassium chloride, 10 mEq, PRN  magnesium 
                                                                                                                                HOSPITALIST PROGRESS NOTES     ADMITTING DATE AND TIME: 5/12/2025 10:24 AM  had concerns including Blood Infection (Sent in from [facility] for positive blood cultures. Seen in ED recently for UTI. Has existing taylor catheter in. Already on IV antibiotics at rehab for endocarditis. PICC line present at triage. ).    ADMIT DX: Bacteremia [R78.81]      SUBJECTIVE:  Patient is being followed for Bacteremia [R78.81]     Patient was seen examined and evaluated  Recent lab results, charts and pertinent diagnostic imaging reviewed   Ancillary service notes reviewed   NAD    Care reviewed with nursing staff, medical and surgical specialty care, primary care and the patient's family as available.   ROS: denies fever, chills, cp, sob, n/v, HA unless stated above.      sodium chloride flush  5-40 mL IntraVENous 2 times per day    enoxaparin  40 mg SubCUTAneous Daily    cefTRIAXone (ROCEPHIN) IV  2,000 mg IntraVENous Q24H    fluconazole  400 mg Oral Daily    sodium chloride flush  5-40 mL IntraVENous 2 times per day    enoxaparin  40 mg SubCUTAneous Daily    ascorbic acid  500 mg Oral BID    aspirin  81 mg Oral Daily    atorvastatin  40 mg Oral Nightly    clopidogrel  75 mg Oral Daily    famotidine  40 mg Oral Daily    [Held by provider] ferrous sulfate  325 mg Oral Daily with breakfast    folic acid  1,000 mcg Oral Daily    gabapentin  300 mg Oral BID    insulin glargine  5 Units SubCUTAneous Nightly    insulin lispro  0-6 Units SubCUTAneous 4x Daily AC & HS    metoprolol succinate  12.5 mg Oral Daily    pantoprazole  40 mg Oral Daily    tamsulosin  0.4 mg Oral Nightly     sodium chloride flush, 5-40 mL, PRN  sodium chloride, , PRN  ondansetron, 4 mg, Q8H PRN   Or  ondansetron, 4 mg, Q6H PRN  tetrahydrozoline, 1 drop, Q4H PRN  benzocaine-menthol, 1 lozenge, Q2H PRN  benzonatate, 100 mg, TID 
                                                                                                                                HOSPITALIST PROGRESS NOTES     ADMITTING DATE AND TIME: 5/12/2025 10:24 AM  had concerns including Blood Infection (Sent in from [facility] for positive blood cultures. Seen in ED recently for UTI. Has existing taylor catheter in. Already on IV antibiotics at rehab for endocarditis. PICC line present at triage. ).    ADMIT DX: Bacteremia [R78.81]      SUBJECTIVE:  Patient is being followed for Bacteremia [R78.81]     Patient was seen examined and evaluated  Recent lab results, charts and pertinent diagnostic imaging reviewed   Ancillary service notes reviewed   NAD    Care reviewed with nursing staff, medical and surgical specialty care, primary care and the patient's family as available.   ROS: denies fever, chills, cp, sob, n/v, HA unless stated above.      sodium chloride flush  5-40 mL IntraVENous 2 times per day    enoxaparin  40 mg SubCUTAneous Daily    cefTRIAXone (ROCEPHIN) IV  2,000 mg IntraVENous Q24H    fluconazole  400 mg Oral Daily    sodium chloride flush  5-40 mL IntraVENous 2 times per day    enoxaparin  40 mg SubCUTAneous Daily    ascorbic acid  500 mg Oral BID    aspirin  81 mg Oral Daily    atorvastatin  40 mg Oral Nightly    clopidogrel  75 mg Oral Daily    famotidine  40 mg Oral Daily    [Held by provider] ferrous sulfate  325 mg Oral Daily with breakfast    folic acid  1,000 mcg Oral Daily    gabapentin  300 mg Oral BID    insulin glargine  5 Units SubCUTAneous Nightly    insulin lispro  0-6 Units SubCUTAneous 4x Daily AC & HS    metoprolol succinate  12.5 mg Oral Daily    pantoprazole  40 mg Oral Daily    tamsulosin  0.4 mg Oral Nightly     sulfur hexafluoride microspheres, 2 mL, ONCE PRN  sodium chloride flush, 5-40 mL, PRN  sodium chloride, , PRN  ondansetron, 4 mg, Q8H PRN   Or  ondansetron, 4 mg, Q6H PRN  tetrahydrozoline, 1 drop, Q4H 
                                                                                                                                HOSPITALIST PROGRESS NOTES     ADMITTING DATE AND TIME: 5/12/2025 10:24 AM  had concerns including Blood Infection (Sent in from [facility] for positive blood cultures. Seen in ED recently for UTI. Has existing taylor catheter in. Already on IV antibiotics at rehab for endocarditis. PICC line present at triage. ).    ADMIT DX: Bacteremia [R78.81]      SUBJECTIVE:  Patient is being followed for Bacteremia [R78.81]     Patient was seen examined and evaluated  Recent lab results, charts and pertinent diagnostic imaging reviewed   Ancillary service notes reviewed   NAD    Care reviewed with nursing staff, medical and surgical specialty care, primary care and the patient's family as available.   ROS: denies fever, chills, cp, sob, n/v, HA unless stated above.      sodium chloride flush  5-40 mL IntraVENous 2 times per day    enoxaparin  40 mg SubCUTAneous Daily    cefTRIAXone (ROCEPHIN) IV  2,000 mg IntraVENous Q24H    fluconazole  400 mg Oral Daily    sodium chloride flush  5-40 mL IntraVENous 2 times per day    enoxaparin  40 mg SubCUTAneous Daily    ascorbic acid  500 mg Oral BID    aspirin  81 mg Oral Daily    atorvastatin  40 mg Oral Nightly    clopidogrel  75 mg Oral Daily    famotidine  40 mg Oral Daily    [Held by provider] ferrous sulfate  325 mg Oral Daily with breakfast    folic acid  1,000 mcg Oral Daily    gabapentin  300 mg Oral BID    insulin glargine  5 Units SubCUTAneous Nightly    insulin lispro  0-6 Units SubCUTAneous 4x Daily AC & HS    metoprolol succinate  12.5 mg Oral Daily    pantoprazole  40 mg Oral Daily    tamsulosin  0.4 mg Oral Nightly     sulfur hexafluoride microspheres, 2 mL, ONCE PRN  sodium chloride flush, 5-40 mL, PRN  sodium chloride, , PRN  ondansetron, 4 mg, Q8H PRN   Or  ondansetron, 4 mg, Q6H PRN  tetrahydrozoline, 1 drop, Q4H 
                                                                                                                                HOSPITALIST PROGRESS NOTES     ADMITTING DATE AND TIME: 5/12/2025 10:24 AM  had concerns including Blood Infection (Sent in from [facility] for positive blood cultures. Seen in ED recently for UTI. Has existing taylor catheter in. Already on IV antibiotics at rehab for endocarditis. PICC line present at triage. ).    ADMIT DX: Bacteremia [R78.81]      SUBJECTIVE:  Patient is being followed for Bacteremia [R78.81]     Patient was seen examined and evaluated  Recent lab results, charts and pertinent diagnostic imaging reviewed   Ancillary service notes reviewed   NAD    Care reviewed with nursing staff, medical and surgical specialty care, primary care and the patient's family as available.   ROS: denies fever, chills, cp, sob, n/v, HA unless stated above.      sodium chloride flush  5-40 mL IntraVENous 2 times per day    enoxaparin  40 mg SubCUTAneous Daily    sodium chloride flush  5-40 mL IntraVENous 2 times per day    enoxaparin  40 mg SubCUTAneous Daily    ascorbic acid  500 mg Oral BID    aspirin  81 mg Oral Daily    atorvastatin  40 mg Oral Nightly    clopidogrel  75 mg Oral Daily    famotidine  40 mg Oral Daily    [Held by provider] ferrous sulfate  325 mg Oral Daily with breakfast    folic acid  1,000 mcg Oral Daily    gabapentin  300 mg Oral BID    insulin glargine  5 Units SubCUTAneous Nightly    insulin lispro  0-6 Units SubCUTAneous 4x Daily AC & HS    metoprolol succinate  12.5 mg Oral Daily    pantoprazole  40 mg Oral Daily    tamsulosin  0.4 mg Oral Nightly    cefepime  2,000 mg IntraVENous q8h     sodium chloride flush, 5-40 mL, PRN  sodium chloride, , PRN  ondansetron, 4 mg, Q8H PRN   Or  ondansetron, 4 mg, Q6H PRN  tetrahydrozoline, 1 drop, Q4H PRN  benzocaine-menthol, 1 lozenge, Q2H PRN  benzonatate, 100 mg, TID PRN  calcium carbonate, 500 mg, TID PRN  hydrALAZINE, 
                                                                                                                                HOSPITALIST PROGRESS NOTES     ADMITTING DATE AND TIME: 5/12/2025 10:24 AM  had concerns including Blood Infection (Sent in from [facility] for positive blood cultures. Seen in ED recently for UTI. Has existing taylor catheter in. Already on IV antibiotics at rehab for endocarditis. PICC line present at triage. ).    ADMIT DX: Bacteremia [R78.81]      SYNOPSIS:  64 y.o. male with past medical history that includes CVA with residual left hemiparesis, diabetes, hypertension, hyperlipidemia, coronary artery disease presented to the ER from nursing facility with positive blood cultures. He was in the ER 5 days ago where he was diagnosed with complicated catheter associated UTI and was started on cefdinir. His blood cultures came back positive and he was referred to return to the ER for admission. Blood cultures grew strep agalactiae. He has history of recent CABG 4/17/2025.  He does also have a foot wound which she follows with podiatry.  He also has a Taylor catheter that is present for urinary retention. ID was consulted and he was admitted to the hospital     5/20 planned for right BKA.  CT surgery was consulted for AV visitation.  No surgical intervention planned, not a surgical candidate.  LYRIC reviewed.  LVEF 60 to 65%.  There is a 0.9 x 0.3 cm vegetation on aortic valve.    5/21 doing well on PCA, pain adequately controlled, pending PICC line, pending Cx     SUBJECTIVE:  Patient is being followed for Bacteremia [R78.81]     Patient was seen examined and evaluated  Recent lab results, charts and pertinent diagnostic imaging reviewed   Ancillary service notes reviewed       Care reviewed with nursing staff, medical and surgical specialty care, primary care and the patient's family as available.   ROS: denies fever, chills, cp, sob, n/v, HA unless stated above.      vancomycin  1,000 
    5/14/2025 4:02 PM  Jerome Steel  [id number]    Subjective:    He is angry because he is hungry   Taylor with yellow urine     Review of Systems  Constitutional: No fever or chills   Respiratory: negative for cough and hemoptysis  Cardiovascular: negative for chest pain and dyspnea  Gastrointestinal: negative for abdominal pain, diarrhea, nausea and vomiting   : See above  Derm: negative for rash and skin lesion(s)  Neurological: negative for seizures and tremors  Musculoskeletal: Negative    Psychiatric: Negative   All other reviews are negative      Scheduled Meds:   sodium chloride flush  5-40 mL IntraVENous 2 times per day    enoxaparin  40 mg SubCUTAneous Daily    ascorbic acid  500 mg Oral BID    aspirin  81 mg Oral Daily    atorvastatin  40 mg Oral Nightly    clopidogrel  75 mg Oral Daily    famotidine  40 mg Oral Daily    [Held by provider] ferrous sulfate  325 mg Oral Daily with breakfast    folic acid  1,000 mcg Oral Daily    gabapentin  300 mg Oral BID    insulin glargine  5 Units SubCUTAneous Nightly    insulin lispro  0-6 Units SubCUTAneous 4x Daily AC & HS    metoprolol succinate  12.5 mg Oral Daily    pantoprazole  40 mg Oral Daily    tamsulosin  0.4 mg Oral Nightly    cefepime  2,000 mg IntraVENous q8h       Objective:  Vitals:    05/14/25 1151   BP:    Pulse:    Resp: 19   Temp:    SpO2:          Allergies: Pcn [penicillins]    General Appearance: alert and oriented to person, place and time and in no acute distress  Skin: no rash or erythema  Head: normocephalic and atraumatic  Pulmonary/Chest: normal air movement, no respiratory distress  Abdomen: soft, non-tender, non-distended  Genitourinary: taylor with yellow urine   Extremities: no cyanosis, clubbing or edema         Labs:     Recent Labs     05/14/25  0550      K 3.8      CO2 21*   BUN 18   CREATININE 0.7   GLUCOSE 115*   CALCIUM 8.6*       Lab Results   Component Value Date/Time    HGB 7.8 05/14/2025 05:50 AM    HCT 24.4 
  Physician Progress Note      PATIENT:               LUCIO ORELLANA  CSN #:                  [id number]  :                       1960  ADMIT DATE:       2025 10:24 AM  DISCH DATE:  RESPONDING  PROVIDER #:        John Valdes MD          QUERY TEXT:    UTI is documented in the medical record  by .   Please clarify the   relationship, if any, with the chronic urinary catheter.    The clinical indicators include:  --Bacteremia strep agalactiae. Bacteriuria Enterobacter cloacae. Complicated   UTI  Urinary retention, chronic Taylor. ID on board, on cefepime. (Jamil)  ---Urine culture negative. (Uro -)  --H&P_ Urinary retention, with taylor catheter- recently grew enterobacter.   (-Lesher)  --CRP 63.8, WBC 6.9,  Options provided:  -- UTI related to chronic indwelling urinary catheter  -- UTI not related to indwelling urinary catheter  -- Other - I will add my own diagnosis  -- Disagree - Not applicable / Not valid  -- Disagree - Clinically unable to determine / Unknown  -- Refer to Clinical Documentation Reviewer    PROVIDER RESPONSE TEXT:    UTI is related to the chronic indwelling urinary catheter.    Query created by: Cam Delgado on 5/15/2025 11:04 AM      Electronically signed by:  John Valdes MD 2025 9:48 AM          
  Physician Progress Note      PATIENT:               LUCIO ORELLANA  CSN #:                  [id number]  :                       1960  ADMIT DATE:       2025 10:24 AM  DISCH DATE:  RESPONDING  PROVIDER #:        John Valdes MD          QUERY TEXT:    The attending physician is required to clarify conflicting documentation in   the medical record.  Noted documentation of severe malnutrition in H&P and   moderate malnutrition per dietician .    The clinical indicators include:  --H&P- Severe protein-energy malnutrition ()  --Dietician Note -  Moderate malnutrition.  Energy Intake:  75% or less estimated energy requirements for 1 month or   longer  Weight Loss:  Unable to assess (d/t fluid shifts)  Body Fat Loss:   (moderate) Orbital, Triceps  Muscle Mass Loss:   (moderate) Temples (temporalis), Clavicles (pectoralis &   deltoids)  ADAT when able & add ONS  Options provided:  -- Severe Malnutrition confirmed and?Moderate Malnutrition ruled out  -- Moderate Malnutrition confirmed and Severe Malnutrition ruled out  -- Other - I will add my own diagnosis  -- Disagree - Not applicable / Not valid  -- Disagree - Clinically unable to determine / Unknown  -- Refer to Clinical Documentation Reviewer    PROVIDER RESPONSE TEXT:    After study, Severe Malnutrition confirmed and?Moderate Malnutrition ruled   out.    Query created by: Cam Delgado on 2025 11:34 AM      Electronically signed by:  John Valdes MD 2025 9:49 AM          
 from [facility] Joyce spoke to patient and mentioned to this RN that patient was having a difficult time coping with the recent news of need for amputation. She states patient said \"next time this happens I will just blow my head off\". She states she does not think patient really meant it, but would like patient to follow up with a psychiatrist. I spoke to bedside nurse who stated patient had been calm and agreeable with surgical plans. Bedside RN and I went to speak to patient regarding what the doctor talked to him and what patient said to Joyce. Patient stated he feels fine, and that he does not know why he said that. He mentioned being depressed, but values his life. He states he is not suicidal.  Updated [facility], they will have their psychiatrist see patient.   
4 Eyes Skin Assessment     NAME:  Jerome Steel  YOB: 1960  MEDICAL RECORD NUMBER:  [id number]    The patient is being assessed for  Admission    I agree that at least one RN has performed a thorough Head to Toe Skin Assessment on the patient. ALL assessment sites listed below have been assessed.      Areas assessed by both nurses:    Head, Face, Ears, Shoulders, Back, Chest, Arms, Elbows, Hands, Sacrum. Buttock, Coccyx, Ischium, Legs. Feet and Heels, and Under Medical Devices         Does the Patient have a Wound? Yes wound(s) were present on assessment. LDA wound assessment was Initiated and completed by RN       Maik Prevention initiated by RN: Yes  Wound Care Orders initiated by RN: Yes    Pressure Injury (Stage 3,4, Unstageable, DTI, NWPT, and Complex wounds) if present, place Wound referral order by RN under : Yes    New Ostomies, if present place, Ostomy referral order under : No     Nurse 1 eSignature: Electronically signed by Luann Benítez RN on 5/12/25 at 11:32 PM EDT    **SHARE this note so that the co-signing nurse can place an eSignature**    Nurse 2 eSignature: {Esignature:[id number]}   
4 Eyes Skin Assessment     NAME:  Jerome Steel  YOB: 1960  MEDICAL RECORD NUMBER:  [id number]    The patient is being assessed for  Cath Lab Post-Op    I agree that at least one RN has performed a thorough Head to Toe Skin Assessment on the patient. ALL assessment sites listed below have been assessed.      Areas assessed by both nurses:    Head, Face, Ears, Shoulders, Back, Chest, Arms, Elbows, Hands, Sacrum. Buttock, Coccyx, Ischium, Legs. Feet and Heels, and Under Medical Devices         Does the Patient have a Wound? No noted wound(s)       Maik Prevention initiated by RN: Yes  Wound Care Orders initiated by RN: No    Pressure Injury (Stage 3,4, Unstageable, DTI, NWPT, and Complex wounds) if present, place Wound referral order by RN under : No    New Ostomies, if present place, Ostomy referral order under : No     Nurse 1 eSignature: Electronically signed by Suze Silva RN on 5/16/25 at 10:25 AM EDT    **SHARE this note so that the co-signing nurse can place an eSignature**    Nurse 2 eSignature: {Esignature:[id number]}  
4 Eyes Skin Assessment     NAME:  Jerome Steel  YOB: 1960  MEDICAL RECORD NUMBER:  [id number]    The patient is being assessed for  Post-Op Surgical    I agree that at least one RN has performed a thorough Head to Toe Skin Assessment on the patient. ALL assessment sites listed below have been assessed.      Areas assessed by both nurses:    Head, Face, Ears, Shoulders, Back, Chest, Arms, Elbows, Hands, Sacrum. Buttock, Coccyx, Ischium, Legs. Feet and Heels, and Under Medical Devices         Does the Patient have a Wound? Yes wound(s) were present on assessment. LDA wound assessment was Initiated and completed by RN       Maik Prevention initiated by RN: Yes  Wound Care Orders initiated by RN: Yes    Pressure Injury (Stage 3,4, Unstageable, DTI, NWPT, and Complex wounds) if present, place Wound referral order by RN under : Yes    New Ostomies, if present place, Ostomy referral order under : No     Nurse 1 eSignature: Electronically signed by Natacha Juárez RN on 5/15/25 at 5:22 AM EDT    **SHARE this note so that the co-signing nurse can place an eSignature**    Nurse 2 eSignature: {Esignature:[id number]}  
Antibiotic Extended Infusion Policy     This patient is on medication that requires renal, weight, and/or indication dose adjustment.      Date Body Weight IBW  Adjusted BW SCr  CrCl Dialysis status BMI   5/12/2025 75.3 kg (166 lb) Ideal body weight: 75.3 kg (166 lb 0.1 oz) Serum creatinine: 0.6 mg/dL (L) 05/12/25 1133  Estimated creatinine clearance: 131 mL/min (A) N/a Body mass index is 23.15 kg/m².       Pharmacy has dose-adjusted the following medication(s):    Ordered Medication: Cefepime 2000mg q12h     Order Changed/converted to: Cefepime 2000mg q8h    These changes were made per protocol according to the [facility]   Automatic Extended Infusion Dose Adjustment Policy.     *Please note this dose may need readjusted if patient's condition changes.    Please contact pharmacy with any questions regarding these changes.    Jerome Leos, PharmD 5/12/2025 2:48 PM    
Attempted to give patient the CHG bath for surgery and he is refusing at this time. He said absolutely not, he is sleeping and they can do it later when he is awake because he was told surgery will be in the afternoon.   
CHG double lumen power picc Placement 5/22/2025    Product number: [id number]   Lot Number: [id number]      Ultrasound: yes   Right Brachial vein:                Upper Arm Circumference: (CM) 28    Size:(FR)/GUAGE 5.5/36 cm    Exposed Length: (CM) 0    Internal Length: (CM) 36   Cut: (CM) 19   Vein Measurement: 0.54 cm              Lidocaine Given: yes lidocaine 1% (from picc kit)                Procedure performed by SUELLEN Cedeño RN  5/22/2025  3:21 PM      CHG double lumen power picc inserted using the VPS guidance system. Tip placed at the lower 1/3 of the SVC/CAJ. Rebecca torres.                     
CTS consult called to Jillian CHE       Electronically signed by Mariann Rodríguez RN on 5/20/2025 at 9:32 AM    
[facility]  [place]  Progress Note      Chief Complaint   Patient presents with    Blood Infection     Sent in from [facility] for positive blood cultures. Seen in ED recently for UTI. Has existing taylor catheter in. Already on IV antibiotics at rehab for endocarditis. PICC line present at triage.        SUBJECTIVE:    Patient is tolerating medications. No reported adverse drug reactions.  No nausea, vomiting, diarrhea. Pain to stump. Getting blood transfusion  Review of systems:  As stated above in the chief complaint, otherwise negative.    Medications:  Scheduled Meds:   vancomycin  1,000 mg IntraVENous Q12H    heparin (porcine)  5,000 Units SubCUTAneous 3 times per day    gabapentin  300 mg Oral TID    meropenem  1,000 mg IntraVENous Q8H    sodium chloride flush  5-40 mL IntraVENous 2 times per day    fluconazole  400 mg Oral Daily    sodium chloride flush  5-40 mL IntraVENous 2 times per day    ascorbic acid  500 mg Oral BID    aspirin  81 mg Oral Daily    atorvastatin  40 mg Oral Nightly    clopidogrel  75 mg Oral Daily    famotidine  40 mg Oral Daily    [Held by provider] ferrous sulfate  325 mg Oral Daily with breakfast    folic acid  1,000 mcg Oral Daily    insulin glargine  5 Units SubCUTAneous Nightly    insulin lispro  0-6 Units SubCUTAneous 4x Daily AC & HS    metoprolol succinate  12.5 mg Oral Daily    pantoprazole  40 mg Oral Daily    tamsulosin  0.4 mg Oral Nightly     Continuous Infusions:   sodium chloride      sodium chloride      sodium chloride      dextrose       PRN Meds:sodium chloride, HYDROmorphone **OR** HYDROmorphone, oxyCODONE-acetaminophen, sulfur hexafluoride microspheres, sodium chloride flush, sodium chloride, ondansetron **OR** ondansetron, tetrahydrozoline, benzocaine-menthol, benzonatate, calcium carbonate, hydrALAZINE, melatonin, sodium chloride, sodium chloride flush, sodium chloride, potassium chloride **OR** potassium alternative oral 
Chart accessed in anticipation of admission to [facility]   
Comprehensive Nutrition Assessment    Type and Reason for Visit:  Reassess    Nutrition Recommendations/Plan:   Continue NPO  Add ONS when able     Malnutrition Assessment:  Malnutrition Status:  Moderate malnutrition (05/14/25 1418)    Context:  Chronic Illness     Findings of the 6 clinical characteristics of malnutrition:  Energy Intake:  75% or less estimated energy requirements for 1 month or longer  Weight Loss:  Unable to assess (d/t fluid shifts)     Body Fat Loss:   (moderate) Orbital, Triceps   Muscle Mass Loss:   (moderate) Temples (temporalis), Clavicles (pectoralis & deltoids)  Fluid Accumulation:  No fluid accumulation     Strength:  Not Performed    Nutrition Assessment:    Pt remains at risk now NPO pending BKA today. Admit from [facility] w/ +blood cultures/strep bacteremia, ntoed recent complicated catheter associated UTI. Noted R heel OM w/ podiatry following, now s/p excisional debridement R heel 5/14 w/ wound vac placed. Pt s/p RLE angiogram 5/16. 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 Pt meets criteria for moderate malnutrition. Noted endocarditis. Will monitor for nutrition progression and add ONS when able.    Nutrition Related Findings:    A&Ox4, active BS, soft abd, +1 edema, -I/Os Wound Type: Multiple, Surgical Incision, Open Wounds (podiatry following)       Current Nutrition Intake & Therapies:    Average Meal Intake: NPO (51-75% avg prior to NPO)  Average Supplements Intake: NPO  Diet NPO Exceptions are: Sips of Water with Meds    Anthropometric Measures:  Height: 180.3 cm (5' 11\")  Ideal Body Weight (IBW): 172 lbs (78 kg)    Admission Body Weight: 73.9 kg (163 lb) (5/15 bed scale)  Current Body Weight: 70.3 kg (155 lb) (5/18 bed scale), 96.5 % IBW.    Current BMI (kg/m2): 21.6  Usual Body Weight:  (147-173# measured wt x 5 months per EMR)                          BMI Categories: Underweight (BMI less than 22) age over 
Department of Podiatry  Progress Note    SUBJECTIVE:  Jerome Steel is seen at bedside s/p BKA right lower extremity (05/20). He is doing well and says that the pain is much the same as yesterday. Left foot is resting in purple heel offloading boots. Has no questions or concerns at this time.    OBJECTIVE:    Scheduled Meds:   vancomycin  1,000 mg IntraVENous Q12H    heparin (porcine)  5,000 Units SubCUTAneous 3 times per day    gabapentin  300 mg Oral TID    meropenem  1,000 mg IntraVENous Q8H    sodium chloride flush  5-40 mL IntraVENous 2 times per day    fluconazole  400 mg Oral Daily    sodium chloride flush  5-40 mL IntraVENous 2 times per day    ascorbic acid  500 mg Oral BID    aspirin  81 mg Oral Daily    atorvastatin  40 mg Oral Nightly    clopidogrel  75 mg Oral Daily    famotidine  40 mg Oral Daily    [Held by provider] ferrous sulfate  325 mg Oral Daily with breakfast    folic acid  1,000 mcg Oral Daily    insulin glargine  5 Units SubCUTAneous Nightly    insulin lispro  0-6 Units SubCUTAneous 4x Daily AC & HS    metoprolol succinate  12.5 mg Oral Daily    pantoprazole  40 mg Oral Daily    tamsulosin  0.4 mg Oral Nightly     Continuous Infusions:   sodium chloride      sodium chloride      sodium chloride      dextrose       PRN Meds:.sodium chloride, HYDROmorphone **OR** HYDROmorphone, oxyCODONE-acetaminophen, sulfur hexafluoride microspheres, sodium chloride flush, sodium chloride, ondansetron **OR** ondansetron, tetrahydrozoline, benzocaine-menthol, benzonatate, calcium carbonate, hydrALAZINE, melatonin, sodium chloride, sodium chloride flush, sodium chloride, potassium chloride **OR** potassium alternative oral replacement **OR** potassium chloride, magnesium sulfate, polyethylene glycol, acetaminophen **OR** acetaminophen, Polyvinyl Alcohol-Povidone PF, docusate sodium, glucose, dextrose bolus **OR** dextrose bolus, glucagon (rDNA), dextrose, fentanNYL    Allergies   Allergen Reactions    Pcn 
Department of Podiatry  Progress Note    SUBJECTIVE:  Jerome Steel is seen at bedside s/p BKA right lower extremity (05/20). He states he's in a lot of pain and the pain pump only takes it from 9/10 to 7/10 and it is not enough. He states his room is too hot, and he wants to the beeping to stop.     OBJECTIVE:    Scheduled Meds:   heparin (porcine)  5,000 Units SubCUTAneous 3 times per day    gabapentin  300 mg Oral TID    sennosides-docusate sodium  1 tablet Oral BID    meropenem  1,000 mg IntraVENous Q8H    vancomycin  1,500 mg IntraVENous Q12H    sodium chloride flush  5-40 mL IntraVENous 2 times per day    fluconazole  400 mg Oral Daily    sodium chloride flush  5-40 mL IntraVENous 2 times per day    ascorbic acid  500 mg Oral BID    aspirin  81 mg Oral Daily    atorvastatin  40 mg Oral Nightly    clopidogrel  75 mg Oral Daily    famotidine  40 mg Oral Daily    [Held by provider] ferrous sulfate  325 mg Oral Daily with breakfast    folic acid  1,000 mcg Oral Daily    insulin glargine  5 Units SubCUTAneous Nightly    insulin lispro  0-6 Units SubCUTAneous 4x Daily AC & HS    metoprolol succinate  12.5 mg Oral Daily    pantoprazole  40 mg Oral Daily    tamsulosin  0.4 mg Oral Nightly     Continuous Infusions:   HYDROmorphone      sodium chloride      sodium chloride      dextrose       PRN Meds:.oxyCODONE **OR** oxyCODONE, naloxone 0.4 mg in 10 mL sodium chloride syringe, sulfur hexafluoride microspheres, sodium chloride flush, sodium chloride, ondansetron **OR** ondansetron, tetrahydrozoline, benzocaine-menthol, benzonatate, calcium carbonate, hydrALAZINE, melatonin, sodium chloride, sodium chloride flush, sodium chloride, potassium chloride **OR** potassium alternative oral replacement **OR** potassium chloride, magnesium sulfate, polyethylene glycol, acetaminophen **OR** acetaminophen, Polyvinyl Alcohol-Povidone PF, docusate sodium, glucose, dextrose bolus **OR** dextrose bolus, glucagon (rDNA), dextrose, 
Department of Podiatry  Progress Note    SUBJECTIVE:  Jerome Steel is seen at bedside s/p BKA right lower extremity (05/20). No acute events overnight. Denies any N/V/F/C. No pedal complaints.     OBJECTIVE:    Scheduled Meds:   cefTRIAXone (ROCEPHIN) IV  2,000 mg IntraVENous Q24H    sodium chloride flush  5-40 mL IntraVENous 2 times per day    lidocaine 1 % injection  50 mg IntraDERmal Once    heparin (porcine)  5,000 Units SubCUTAneous 3 times per day    gabapentin  300 mg Oral TID    sodium chloride flush  5-40 mL IntraVENous 2 times per day    sodium chloride flush  5-40 mL IntraVENous 2 times per day    ascorbic acid  500 mg Oral BID    aspirin  81 mg Oral Daily    atorvastatin  40 mg Oral Nightly    clopidogrel  75 mg Oral Daily    famotidine  40 mg Oral Daily    [Held by provider] ferrous sulfate  325 mg Oral Daily with breakfast    folic acid  1,000 mcg Oral Daily    insulin glargine  5 Units SubCUTAneous Nightly    insulin lispro  0-6 Units SubCUTAneous 4x Daily AC & HS    metoprolol succinate  12.5 mg Oral Daily    pantoprazole  40 mg Oral Daily    tamsulosin  0.4 mg Oral Nightly     Continuous Infusions:   sodium chloride      sodium chloride      sodium chloride      sodium chloride      dextrose       PRN Meds:.sodium chloride, sodium chloride flush, sodium chloride, HYDROmorphone **OR** HYDROmorphone, oxyCODONE-acetaminophen, sulfur hexafluoride microspheres, sodium chloride flush, sodium chloride, ondansetron **OR** ondansetron, tetrahydrozoline, benzocaine-menthol, benzonatate, calcium carbonate, hydrALAZINE, melatonin, sodium chloride, sodium chloride flush, sodium chloride, potassium chloride **OR** potassium alternative oral replacement **OR** potassium chloride, magnesium sulfate, polyethylene glycol, acetaminophen **OR** acetaminophen, Polyvinyl Alcohol-Povidone PF, docusate sodium, glucose, dextrose bolus **OR** dextrose bolus, glucagon (rDNA), dextrose    Allergies   Allergen Reactions    
Department of Podiatry  Progress Note    SUBJECTIVE:  Jerome Steel is seen at bedside s/p debridement of right heel wound (5/14). Patient is asleep at the time of the exam. Appropriate chest rise and fall visualized. Chart check reveals BKA scheduled for 05/20/25.     OBJECTIVE:    Scheduled Meds:   meropenem  1,000 mg IntraVENous Q8H    vancomycin  1,500 mg IntraVENous Q12H    sodium chloride flush  5-40 mL IntraVENous 2 times per day    fluconazole  400 mg Oral Daily    sodium chloride flush  5-40 mL IntraVENous 2 times per day    enoxaparin  40 mg SubCUTAneous Daily    ascorbic acid  500 mg Oral BID    aspirin  81 mg Oral Daily    atorvastatin  40 mg Oral Nightly    clopidogrel  75 mg Oral Daily    famotidine  40 mg Oral Daily    [Held by provider] ferrous sulfate  325 mg Oral Daily with breakfast    folic acid  1,000 mcg Oral Daily    gabapentin  300 mg Oral BID    insulin glargine  5 Units SubCUTAneous Nightly    insulin lispro  0-6 Units SubCUTAneous 4x Daily AC & HS    metoprolol succinate  12.5 mg Oral Daily    pantoprazole  40 mg Oral Daily    tamsulosin  0.4 mg Oral Nightly     Continuous Infusions:   sodium chloride 75 mL/hr at 05/20/25 0721    sodium chloride      sodium chloride      dextrose       PRN Meds:.sulfur hexafluoride microspheres, sodium chloride flush, sodium chloride, ondansetron **OR** ondansetron, tetrahydrozoline, benzocaine-menthol, benzonatate, calcium carbonate, hydrALAZINE, melatonin, sodium chloride, sodium chloride flush, sodium chloride, potassium chloride **OR** potassium alternative oral replacement **OR** potassium chloride, magnesium sulfate, polyethylene glycol, acetaminophen **OR** acetaminophen, Polyvinyl Alcohol-Povidone PF, docusate sodium, glucose, dextrose bolus **OR** dextrose bolus, glucagon (rDNA), dextrose, oxyCODONE-acetaminophen, fentanNYL    Allergies   Allergen Reactions    Pcn [Penicillins] Anaphylaxis       /62   Pulse 77   Temp 97.6 °F (36.4 °C) 
Department of Podiatry  Progress Note    SUBJECTIVE:  Jerome Steel is seen at bedside s/p debridement of right heel wound (5/14). Patient is in bed with breakfast tray. When asked if patient thought anymore about going forward with an amputation, patient states \"not right now.\" No pedal complaints.     OBJECTIVE:    Scheduled Meds:   sodium chloride flush  5-40 mL IntraVENous 2 times per day    enoxaparin  40 mg SubCUTAneous Daily    cefTRIAXone (ROCEPHIN) IV  2,000 mg IntraVENous Q24H    fluconazole  400 mg Oral Daily    sodium chloride flush  5-40 mL IntraVENous 2 times per day    enoxaparin  40 mg SubCUTAneous Daily    ascorbic acid  500 mg Oral BID    aspirin  81 mg Oral Daily    atorvastatin  40 mg Oral Nightly    clopidogrel  75 mg Oral Daily    famotidine  40 mg Oral Daily    [Held by provider] ferrous sulfate  325 mg Oral Daily with breakfast    folic acid  1,000 mcg Oral Daily    gabapentin  300 mg Oral BID    insulin glargine  5 Units SubCUTAneous Nightly    insulin lispro  0-6 Units SubCUTAneous 4x Daily AC & HS    metoprolol succinate  12.5 mg Oral Daily    pantoprazole  40 mg Oral Daily    tamsulosin  0.4 mg Oral Nightly     Continuous Infusions:   sodium chloride      sodium chloride      dextrose       PRN Meds:.sulfur hexafluoride microspheres, sodium chloride flush, sodium chloride, ondansetron **OR** ondansetron, tetrahydrozoline, benzocaine-menthol, benzonatate, calcium carbonate, hydrALAZINE, melatonin, sodium chloride, sodium chloride flush, sodium chloride, potassium chloride **OR** potassium alternative oral replacement **OR** potassium chloride, magnesium sulfate, polyethylene glycol, acetaminophen **OR** acetaminophen, Polyvinyl Alcohol-Povidone PF, docusate sodium, glucose, dextrose bolus **OR** dextrose bolus, glucagon (rDNA), dextrose, oxyCODONE-acetaminophen, fentanNYL    Allergies   Allergen Reactions    Pcn [Penicillins] Anaphylaxis       BP (!) 118/54   Pulse 60   Temp 97.7 °F 
Department of Podiatry  Progress Note    SUBJECTIVE:  Jerome Steel is seen at bedside s/p debridement of right heel wound (5/14). Patient is sleeping at the time of the exam. Appropriate chest rise and fall visualized.    OBJECTIVE:    Scheduled Meds:   sodium chloride flush  5-40 mL IntraVENous 2 times per day    enoxaparin  40 mg SubCUTAneous Daily    cefTRIAXone (ROCEPHIN) IV  2,000 mg IntraVENous Q24H    fluconazole  400 mg Oral Daily    sodium chloride flush  5-40 mL IntraVENous 2 times per day    enoxaparin  40 mg SubCUTAneous Daily    ascorbic acid  500 mg Oral BID    aspirin  81 mg Oral Daily    atorvastatin  40 mg Oral Nightly    clopidogrel  75 mg Oral Daily    famotidine  40 mg Oral Daily    [Held by provider] ferrous sulfate  325 mg Oral Daily with breakfast    folic acid  1,000 mcg Oral Daily    gabapentin  300 mg Oral BID    insulin glargine  5 Units SubCUTAneous Nightly    insulin lispro  0-6 Units SubCUTAneous 4x Daily AC & HS    metoprolol succinate  12.5 mg Oral Daily    pantoprazole  40 mg Oral Daily    tamsulosin  0.4 mg Oral Nightly     Continuous Infusions:   sodium chloride      sodium chloride      dextrose       PRN Meds:.sodium chloride flush, sodium chloride, ondansetron **OR** ondansetron, tetrahydrozoline, benzocaine-menthol, benzonatate, calcium carbonate, hydrALAZINE, melatonin, sodium chloride, sodium chloride flush, sodium chloride, potassium chloride **OR** potassium alternative oral replacement **OR** potassium chloride, magnesium sulfate, polyethylene glycol, acetaminophen **OR** acetaminophen, Polyvinyl Alcohol-Povidone PF, docusate sodium, glucose, dextrose bolus **OR** dextrose bolus, glucagon (rDNA), dextrose, oxyCODONE-acetaminophen, fentanNYL    Allergies   Allergen Reactions    Pcn [Penicillins] Anaphylaxis       /61   Pulse 69   Temp 97.6 °F (36.4 °C) (Temporal)   Resp 20   Ht 1.803 m (5' 11\")   Wt 74.3 kg (163 lb 14.4 oz)   SpO2 96%   BMI 22.86 kg/m² 
Department of Podiatry  Progress Note    SUBJECTIVE:  Jerome Steel is seen at bedside s/p debridement of right heel wound (5/14). Patient states he's upset because they want to take off his right foot. He was told that the wound looked good on Thursday. He has no other podiatric concerns at this time.     OBJECTIVE:    Scheduled Meds:   sodium chloride flush  5-40 mL IntraVENous 2 times per day    cefTRIAXone (ROCEPHIN) IV  2,000 mg IntraVENous Q24H    fluconazole  400 mg Oral Daily    sodium chloride flush  5-40 mL IntraVENous 2 times per day    enoxaparin  40 mg SubCUTAneous Daily    ascorbic acid  500 mg Oral BID    aspirin  81 mg Oral Daily    atorvastatin  40 mg Oral Nightly    clopidogrel  75 mg Oral Daily    famotidine  40 mg Oral Daily    [Held by provider] ferrous sulfate  325 mg Oral Daily with breakfast    folic acid  1,000 mcg Oral Daily    gabapentin  300 mg Oral BID    insulin glargine  5 Units SubCUTAneous Nightly    insulin lispro  0-6 Units SubCUTAneous 4x Daily AC & HS    metoprolol succinate  12.5 mg Oral Daily    pantoprazole  40 mg Oral Daily    tamsulosin  0.4 mg Oral Nightly     Continuous Infusions:   sodium chloride      sodium chloride      dextrose       PRN Meds:.sulfur hexafluoride microspheres, sodium chloride flush, sodium chloride, ondansetron **OR** ondansetron, tetrahydrozoline, benzocaine-menthol, benzonatate, calcium carbonate, hydrALAZINE, melatonin, sodium chloride, sodium chloride flush, sodium chloride, potassium chloride **OR** potassium alternative oral replacement **OR** potassium chloride, magnesium sulfate, polyethylene glycol, acetaminophen **OR** acetaminophen, Polyvinyl Alcohol-Povidone PF, docusate sodium, glucose, dextrose bolus **OR** dextrose bolus, glucagon (rDNA), dextrose, oxyCODONE-acetaminophen, fentanNYL    Allergies   Allergen Reactions    Pcn [Penicillins] Anaphylaxis       BP (!) 111/58   Pulse 67   Temp 97.6 °F (36.4 °C) (Temporal)   Resp 18   Ht 
Discussed with patient at the bedside   Discussed options of right femoropopliteal bypass below the knee, pedal access angiogram, or right BKA.    He understands that the likelihood of salvage is low    He would like to proceed with right BKA most likely though he wants to discuss further with his brother and will let us know     Maida reid  
Flavia to Dr. Valdes at patient request for eye drops   
I was messaged by the nurse regarding surgery for the patient.  Patient underwent transesophageal echo today.  Although official read is not in the chart, is reported to have vegetations indicative of endocarditis.  When I evaluated the patient he was resting comfortably without issue.  He did make it clear that he wishes to pursue right below the knee amputation.  As per Zheng's note from earlier, vascular surgery is planning for BKA 5/20.  Patient with regular heart rate, normotensive and on room air.  His left lower extremity had a wound VAC in place and dressings.  There is a picture in the media from today.     Electronically signed by Theo Bland DO on 5/19/2025 at 5:02 PM   
IV team messaged via perfect serve for PICC order placed  
Located within [facility]  [place]  Progress Note      Chief Complaint   Patient presents with    Blood Infection     Sent in from [facility] for positive blood cultures. Seen in ED recently for UTI. Has existing taylor catheter in. Already on IV antibiotics at rehab for endocarditis. PICC line present at triage.        SUBJECTIVE:    Patient is tolerating medications. No reported adverse drug reactions.  No nausea, vomiting, diarrhea. No pain. Discussed he will need Iv antibiotics and picc line    Review of systems:  As stated above in the chief complaint, otherwise negative.    Medications:  Scheduled Meds:   sodium chloride flush  5-40 mL IntraVENous 2 times per day    enoxaparin  40 mg SubCUTAneous Daily    sodium chloride flush  5-40 mL IntraVENous 2 times per day    enoxaparin  40 mg SubCUTAneous Daily    ascorbic acid  500 mg Oral BID    aspirin  81 mg Oral Daily    atorvastatin  40 mg Oral Nightly    clopidogrel  75 mg Oral Daily    famotidine  40 mg Oral Daily    [Held by provider] ferrous sulfate  325 mg Oral Daily with breakfast    folic acid  1,000 mcg Oral Daily    gabapentin  300 mg Oral BID    insulin glargine  5 Units SubCUTAneous Nightly    insulin lispro  0-6 Units SubCUTAneous 4x Daily AC & HS    metoprolol succinate  12.5 mg Oral Daily    pantoprazole  40 mg Oral Daily    tamsulosin  0.4 mg Oral Nightly    cefepime  2,000 mg IntraVENous q8h     Continuous Infusions:   sodium chloride      sodium chloride      dextrose       PRN Meds:sodium chloride flush, sodium chloride, ondansetron **OR** ondansetron, tetrahydrozoline, benzocaine-menthol, benzonatate, calcium carbonate, hydrALAZINE, melatonin, sodium chloride, sodium chloride flush, sodium chloride, potassium chloride **OR** potassium alternative oral replacement **OR** potassium chloride, magnesium sulfate, polyethylene glycol, acetaminophen **OR** acetaminophen, Polyvinyl Alcohol-Povidone PF, docusate sodium, glucose, 
Message sent to Dr. Valdes regarding double lovenox orders.   
Message sent to Lucio Temple regarding pts diet and LYRIC results   
Message sent to vascular surgery resident left GABBIE.   
Message sent to vascular surgery to get pt scheduled for BKA , Per ID pt needs BKA.   
Messaged Dr Valdes to request a uro and podiatry consult     Electronically signed by Mariann Rodríguez RN on 5/13/2025 at 8:25 AM    
Messaged Dr. Bland regarding anticoagulant administration.   
Messaged Dr. Ramirez to see about holding blood thinners for procedure.     Okay to hold anticoagulants per Dr. Ramirez.   
Messaged IV team for PICC line insertion.   
Messaged IV team to assess patient's leaking PICC line. Also asked if they could place a peripheral IV if PICC line is unable to be used.   
Messaged Vascular to come speak to patient regarding amputation, per ID and podiatry recommendations. Patient agreeable to BKA   
[facility] nurse [facility]  
Occupational Therapy  Attempted OT eval at b/s, however, pt declined to participate in any ax despite much encouragement and education.  Will attempt assessment at a later time.  Thank you for this referral. CAROLE Sky, OTR/L  # [id number]    
Occupational Therapy  Attempted OT eval this date, however, pt is tentatively scheduled for Debridement of Right Heel Wound this date.  Will Hold OT assessment until pt is medically appropriate to participate in upright ax and WB status recommendations are available from Podiatry post-op for pt's safety.  Thank you for this referral. CAROLE Sky, OTR/L  # [id number]    
Order for wound care consult  
PCT Darnell escorted [facility] at this time.   
[facility]  [place]UAN  Progress Note      Chief Complaint   Patient presents with    Blood Infection     Sent in from [facility] for positive blood cultures. Seen in ED recently for UTI. Has existing taylor catheter in. Already on IV antibiotics at rehab for endocarditis. PICC line present at triage.        SUBJECTIVE:    Patient is tolerating medications. No reported adverse drug reactions.  No nausea, vomiting, diarrhea. RN in room.   Review of systems:  As stated above in the chief complaint, otherwise negative.    Medications:  Scheduled Meds:   sodium chloride flush  5-40 mL IntraVENous 2 times per day    enoxaparin  40 mg SubCUTAneous Daily    cefTRIAXone (ROCEPHIN) IV  2,000 mg IntraVENous Q24H    fluconazole  400 mg Oral Daily    sodium chloride flush  5-40 mL IntraVENous 2 times per day    enoxaparin  40 mg SubCUTAneous Daily    ascorbic acid  500 mg Oral BID    aspirin  81 mg Oral Daily    atorvastatin  40 mg Oral Nightly    clopidogrel  75 mg Oral Daily    famotidine  40 mg Oral Daily    [Held by provider] ferrous sulfate  325 mg Oral Daily with breakfast    folic acid  1,000 mcg Oral Daily    gabapentin  300 mg Oral BID    insulin glargine  5 Units SubCUTAneous Nightly    insulin lispro  0-6 Units SubCUTAneous 4x Daily AC & HS    metoprolol succinate  12.5 mg Oral Daily    pantoprazole  40 mg Oral Daily    tamsulosin  0.4 mg Oral Nightly     Continuous Infusions:   sodium chloride      sodium chloride      dextrose       PRN Meds:sulfur hexafluoride microspheres, sodium chloride flush, sodium chloride, ondansetron **OR** ondansetron, tetrahydrozoline, benzocaine-menthol, benzonatate, calcium carbonate, hydrALAZINE, melatonin, sodium chloride, sodium chloride flush, sodium chloride, potassium chloride **OR** potassium alternative oral replacement **OR** potassium chloride, magnesium sulfate, polyethylene glycol, acetaminophen **OR** acetaminophen, Polyvinyl 
[employer] to cardiothoracic at this time regarding patient stitches being intact; states they will assess patient.   
Pharmacy Consultation Note  (Antibiotic Dosing and Monitoring)    Initial consult date: 5/19/25  Consulting physician/provider: Zoë Chang APRN - CNP   Drug: Vancomycin  Indication: Bone and Joint Infection     Age/  Gender Height Weight IBW  Allergy Information   64 y.o./male 180.3 cm (5' 11\") 75.3 kg (166 lb)     Ideal body weight: 75.3 kg (166 lb 0.1 oz)   Pcn [penicillins]      Renal Function:  Recent Labs     05/17/25  0544 05/18/25  0559 05/19/25  0728   BUN 16 15 14   CREATININE 0.7 0.7 0.6*       Intake/Output Summary (Last 24 hours) at 5/19/2025 1017  Last data filed at 5/19/2025 0800  Gross per 24 hour   Intake 600 ml   Output 1050 ml   Net -450 ml     Vancomycin Monitoring:  Trough:  No results for input(s): \"VANCOTROUGH\" in the last 72 hours.  Random:  No results for input(s): \"VANCORANDOM\" in the last 72 hours.    Vancomycin Administration Times:  Recent vancomycin administrations        No vancomycin IV orders with administrations found.            Orders not given:            vancomycin (VANCOCIN) 2000 mg in 0.9% sodium chloride 500 mL IVPB    vancomycin (VANCOCIN) 1,500 mg in sodium chloride 0.9 % 250 mL IVPB ([id number])                    Assessment:  Patient is a 64 y.o. male who has been initiated on vancomycin  Estimated Creatinine Clearance: 123 mL/min (A) (based on SCr of 0.6 mg/dL (L)).  To dose vancomycin, pharmacy will be utilizing  [employer] calculation software and levels.     Plan:  Vancomycin 2000 mg IV once then 1500 mg IV every 12 hours (estimated , estimated trough 16.5)  Will check vancomycin levels when appropriate - check random level tomorrow morning  Will continue to monitor renal function   Pharmacy to follow      Aury Shetty, PharmD, BCPS 5/19/2025 10:17 AM  Ext [phone]    
Pharmacy Consultation Note  (Antibiotic Dosing and Monitoring)    Initial consult date: 5/19/25  Consulting physician/provider: Zoë Chang APRN - CNP   Drug: Vancomycin  Indication: Bone and Joint Infection     Age/  Gender Height Weight IBW  Allergy Information   64 y.o./male 180.3 cm (5' 11\") 75.3 kg (166 lb)     Ideal body weight: 75.3 kg (166 lb 0.1 oz)   Pcn [penicillins]      Renal Function:  Recent Labs     05/18/25  0559 05/19/25  0728 05/20/25  0644   BUN 15 14  --    CREATININE 0.7 0.6* 0.6*       Intake/Output Summary (Last 24 hours) at 5/20/2025 1018  Last data filed at 5/20/2025 0820  Gross per 24 hour   Intake 1060.88 ml   Output 1175 ml   Net -114.12 ml     Vancomycin Monitoring:  Trough:  No results for input(s): \"VANCOTROUGH\" in the last 72 hours.  Random:    Recent Labs     05/20/25  0644   VANCORANDOM 23.9     Vancomycin Administration Times:  Recent vancomycin administrations                     vancomycin (VANCOCIN) 1,500 mg in sodium chloride 0.9 % 250 mL IVPB ([id number]) (mg) 1,500 mg New Bag 05/19/25 2228    vancomycin (VANCOCIN) 2000 mg in 0.9% sodium chloride 500 mL IVPB (mg) 2,000 mg New Bag 05/19/25 1355                  Assessment:  Patient is a 64 y.o. male who has been initiated on vancomycin  Estimated Creatinine Clearance: 123 mL/min (A) (based on SCr of 0.6 mg/dL (L)).  To dose vancomycin, pharmacy will be utilizing  [employer] calculation software and levels.   5/20: Random vanco level 23.9 (~8 hours post-dose).     Plan:  Continue vancomycin 1500 mg IV every 12 hours (estimated trough 19.5)  Will check vancomycin levels when appropriate - check trough level tomorrow  Will continue to monitor renal function   Pharmacy to follow      Aury Shetty PharmD, BCPS 5/20/2025 10:18 AM  Ext [phone]    
Pharmacy Consultation Note  (Antibiotic Dosing and Monitoring)    Initial consult date: 5/19/25  Consulting physician/provider: Zoë Chang APRN - CNP   Drug: Vancomycin  Indication: Bone and Joint Infection     Age/  Gender Height Weight IBW  Allergy Information   64 y.o./male 180.3 cm (5' 11\") 75.3 kg (166 lb)     Ideal body weight: 75.3 kg (166 lb 0.1 oz)   Pcn [penicillins]      Renal Function:  Recent Labs     05/19/25  0728 05/20/25  0644 05/21/25  0857   BUN 14  --   --    CREATININE 0.6* 0.6* 0.6*       Intake/Output Summary (Last 24 hours) at 5/21/2025 1048  Last data filed at 5/21/2025 0800  Gross per 24 hour   Intake 640 ml   Output 1200 ml   Net -560 ml     Vancomycin Monitoring:  Trough:    Recent Labs     05/21/25  0857   VANCOTROUGH 25.3*     Random:    Recent Labs     05/20/25  0644   VANCORANDOM 23.9     Vancomycin Administration Times:  Recent vancomycin administrations                     vancomycin (VANCOCIN) 1,500 mg in sodium chloride 0.9 % 250 mL IVPB ([id number]) (mg) 1,500 mg New Bag 05/20/25 2109     1,500 mg New Bag  1101     1,500 mg New Bag 05/19/25 2228    vancomycin (VANCOCIN) 2000 mg in 0.9% sodium chloride 500 mL IVPB (mg) 2,000 mg New Bag 05/19/25 1355                  Assessment:  Patient is a 64 y.o. male who has been initiated on vancomycin  Estimated Creatinine Clearance: 123 mL/min (A) (based on SCr of 0.6 mg/dL (L)).  To dose vancomycin, pharmacy will be utilizing  [employer] calculation software and levels.   5/20: Random vanco level 23.9 (~8 hours post-dose).   5/21: Vanco trough level 25.3.     Plan:  Decrease to vancomycin 1000 mg IV every 12 hours (estimated trough 15.8)  Will check vancomycin levels when appropriate   Will continue to monitor renal function   Pharmacy to follow      Aury Shetty PharmD, BCPS 5/21/2025 10:48 AM  Ext [phone]    
Physical Therapy    PT order received and medical chart reviewed 5/14. Plan is for OR today for right heel wound. Will re-attempt at a later time/date. Thank you.    Trevor Mckeon, PT, DPT  [id number]       
Physical Therapy  Initial Assessment     Name: Jerome Steel  : 1960  MRN: [id number]      Date of Service: 5/15/2025    Evaluating PT: Trevor Mckeon, PT, DPT [id number]      Room #:  7414/7414-A  Diagnosis:  Bacteremia [R78.81]  PMHx/PSHx:   has a past medical history of Abscess of prostate, Anemia, Arthritis, Atherosclerotic heart disease of native coronary artery without angina pectoris, Bilateral carpal tunnel syndrome, Cerebral artery occlusion with cerebral infarction ([facility]), Chronic back pain, COPD (chronic obstructive pulmonary disease) ([facility]), Coronary artery disease involving native coronary artery of native heart without angina pectoris, CVA (cerebral vascular accident) ([facility]), Diabetes mellitus ([facility]), Diabetic foot ulcer with osteomyelitis ([facility]), 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 2 diabetes mellitus, with necrosis of bone ([facility]), Disease of multiple valves of heart, Dysphagia, EtOH dependence ([facility]), Falls, GERD (gastroesophageal reflux disease), Hemiparesis affecting left side as late effect of cerebrovascular accident ([facility]), Hydrocele, Hyperlipidemia, Hypertension, Hypothyroid, Inguinal hernia, MI (myocardial infarction) ([facility]), Moderate aortic regurgitation, Multiple fractures of rib involving four or more ribs, Obstructive and reflux uropathy, Osteomyelitis, ankle and foot ([facility]), Paralytic gait, Peripheral vascular angioplasty status, PFO (patent foramen ovale), Psychosis ([facility]), Schizophrenia ([facility]), Spondylosis, TIA (transient ischemic attack), Traumatic hemorrhage of cerebrum ([facility]), Ulcer of left heel, with necrosis of bone ([facility]), Ulcer of left heel, with necrosis of muscle ([facility]), UTI (urinary tract infection), and Vitamin D deficiency.  Procedure/Surgery:  Excisional wound debridement of right heel wound to and through level of deep fascia and muscle, Incision and drainage, right calcaneus bone   Pertinent Information:  CABG 25  Precautions:  Fall risk, NWB RLE per 
Report called to [phone]  
Spoke to nurse on floor taking care of pt. Informed them that pt has been sent for in transport to come to OR. Requested pt be in a gown, with a working IV and procedure consent signed and placed in chart.   
The patient is postoperative BKA.  The patient will be started on a Dilaudid PCA.  Additionally 5 mg of oral oxycodone every 6 hours for longer acting pain control will be available.  Will increase his gabapentin from 300 twice daily to 3 times daily.  The patient will need strict I's and O's  He is to have a regular diet, carb control if necessary.  DVT prophylaxis with heparin will start tomorrow.  He was on Lovenox, this may be reasonable as well.  New order for a.m. labs  Monitor for strikethrough on dressing  Patient has the same wound VAC but now there is a Prevena over his BKA with staples.  Monitor for bleeding or strikethrough.    If there are any issues overnight, please message vascular resident on-call.  Thanks.    Electronically signed by Theo Bland DO on 5/20/2025 at 7:03 PM   
[facility]  DAJUAN  Progress Note      Chief Complaint   Patient presents with    Blood Infection     Sent in from [facility] for positive blood cultures. Seen in ED recently for UTI. Has existing taylor catheter in. Already on IV antibiotics at rehab for endocarditis. PICC line present at triage.        SUBJECTIVE:    Patient is tolerating medications. No reported adverse drug reactions.  No nausea, vomiting, diarrhea. He is unsure if he wants amputation  Review of systems:  As stated above in the chief complaint, otherwise negative.    Medications:  Scheduled Meds:   sodium chloride flush  5-40 mL IntraVENous 2 times per day    cefTRIAXone (ROCEPHIN) IV  2,000 mg IntraVENous Q24H    fluconazole  400 mg Oral Daily    sodium chloride flush  5-40 mL IntraVENous 2 times per day    enoxaparin  40 mg SubCUTAneous Daily    ascorbic acid  500 mg Oral BID    aspirin  81 mg Oral Daily    atorvastatin  40 mg Oral Nightly    clopidogrel  75 mg Oral Daily    famotidine  40 mg Oral Daily    [Held by provider] ferrous sulfate  325 mg Oral Daily with breakfast    folic acid  1,000 mcg Oral Daily    gabapentin  300 mg Oral BID    insulin glargine  5 Units SubCUTAneous Nightly    insulin lispro  0-6 Units SubCUTAneous 4x Daily AC & HS    metoprolol succinate  12.5 mg Oral Daily    pantoprazole  40 mg Oral Daily    tamsulosin  0.4 mg Oral Nightly     Continuous Infusions:   sodium chloride      sodium chloride      dextrose       PRN Meds:sulfur hexafluoride microspheres, sodium chloride flush, sodium chloride, ondansetron **OR** ondansetron, tetrahydrozoline, benzocaine-menthol, benzonatate, calcium carbonate, hydrALAZINE, melatonin, sodium chloride, sodium chloride flush, sodium chloride, potassium chloride **OR** potassium alternative oral replacement **OR** potassium chloride, magnesium sulfate, polyethylene glycol, acetaminophen **OR** acetaminophen, Polyvinyl Alcohol-Povidone PF, 
Urology consult called to Swati Ahn NP      Electronically signed by Mariann Rodríguez RN on 5/13/2025 at 12:53 PM    
VASCULAR SURGERY PROGRESS NOTE    Pt is being seen in f/u today regarding peripheral vascular disease and tissue loss    SUBJECTIVE  Patient is postop day 1 right BKA.  Apparently there were issues overnight with IV access.  He was ordered Dilaudid PCA but this was never started.    CURRENT MEDICATIONS    HYDROmorphone      sodium chloride      sodium chloride      dextrose        oxyCODONE, naloxone 0.4 mg in 10 mL sodium chloride syringe, sulfur hexafluoride microspheres, sodium chloride flush, sodium chloride, ondansetron **OR** ondansetron, tetrahydrozoline, benzocaine-menthol, benzonatate, calcium carbonate, hydrALAZINE, melatonin, sodium chloride, sodium chloride flush, sodium chloride, potassium chloride **OR** potassium alternative oral replacement **OR** potassium chloride, magnesium sulfate, polyethylene glycol, acetaminophen **OR** acetaminophen, Polyvinyl Alcohol-Povidone PF, docusate sodium, glucose, dextrose bolus **OR** dextrose bolus, glucagon (rDNA), dextrose, fentanNYL    heparin (porcine)  5,000 Units SubCUTAneous 3 times per day    gabapentin  300 mg Oral TID    sennosides-docusate sodium  1 tablet Oral BID    meropenem  1,000 mg IntraVENous Q8H    vancomycin  1,500 mg IntraVENous Q12H    sodium chloride flush  5-40 mL IntraVENous 2 times per day    fluconazole  400 mg Oral Daily    sodium chloride flush  5-40 mL IntraVENous 2 times per day    ascorbic acid  500 mg Oral BID    aspirin  81 mg Oral Daily    atorvastatin  40 mg Oral Nightly    clopidogrel  75 mg Oral Daily    famotidine  40 mg Oral Daily    [Held by provider] ferrous sulfate  325 mg Oral Daily with breakfast    folic acid  1,000 mcg Oral Daily    insulin glargine  5 Units SubCUTAneous Nightly    insulin lispro  0-6 Units SubCUTAneous 4x Daily AC & HS    metoprolol succinate  12.5 mg Oral Daily    pantoprazole  40 mg Oral Daily    tamsulosin  0.4 mg Oral Nightly        PHYSICAL EXAM   /67   Pulse 79   Temp 97.2 °F (36.2 °C) 
VASCULAR SURGERY PROGRESS NOTE    Pt is being seen in f/u today regarding peripheral vascular disease and tissue loss    SUBJECTIVE  Patient is postop day 2 right BKA.  Overall reports his pain is better controlled this morning with the PRNs.  He states that the Cole catheter was placed around the time of his heart surgery.    CURRENT MEDICATIONS    sodium chloride      sodium chloride      sodium chloride      dextrose        sodium chloride, HYDROmorphone **OR** HYDROmorphone, oxyCODONE-acetaminophen, sulfur hexafluoride microspheres, sodium chloride flush, sodium chloride, ondansetron **OR** ondansetron, tetrahydrozoline, benzocaine-menthol, benzonatate, calcium carbonate, hydrALAZINE, melatonin, sodium chloride, sodium chloride flush, sodium chloride, potassium chloride **OR** potassium alternative oral replacement **OR** potassium chloride, magnesium sulfate, polyethylene glycol, acetaminophen **OR** acetaminophen, Polyvinyl Alcohol-Povidone PF, docusate sodium, glucose, dextrose bolus **OR** dextrose bolus, glucagon (rDNA), dextrose, fentanNYL    vancomycin  1,000 mg IntraVENous Q12H    heparin (porcine)  5,000 Units SubCUTAneous 3 times per day    gabapentin  300 mg Oral TID    meropenem  1,000 mg IntraVENous Q8H    sodium chloride flush  5-40 mL IntraVENous 2 times per day    fluconazole  400 mg Oral Daily    sodium chloride flush  5-40 mL IntraVENous 2 times per day    ascorbic acid  500 mg Oral BID    aspirin  81 mg Oral Daily    atorvastatin  40 mg Oral Nightly    clopidogrel  75 mg Oral Daily    famotidine  40 mg Oral Daily    [Held by provider] ferrous sulfate  325 mg Oral Daily with breakfast    folic acid  1,000 mcg Oral Daily    insulin glargine  5 Units SubCUTAneous Nightly    insulin lispro  0-6 Units SubCUTAneous 4x Daily AC & HS    metoprolol succinate  12.5 mg Oral Daily    pantoprazole  40 mg Oral Daily    tamsulosin  0.4 mg Oral Nightly        PHYSICAL EXAM   /60   Pulse 72   Temp 
VASCULAR SURGERY PROGRESS NOTE    Pt is being seen in f/u today regarding peripheral vascular disease and tissue loss    SUBJECTIVE  Patient is postop day 3 and overall is doing well.  No new issues.    CURRENT MEDICATIONS    sodium chloride      sodium chloride      sodium chloride      sodium chloride      dextrose        sodium chloride, sodium chloride flush, sodium chloride, HYDROmorphone **OR** HYDROmorphone, oxyCODONE-acetaminophen, sulfur hexafluoride microspheres, sodium chloride flush, sodium chloride, ondansetron **OR** ondansetron, tetrahydrozoline, benzocaine-menthol, benzonatate, calcium carbonate, hydrALAZINE, melatonin, sodium chloride, sodium chloride flush, sodium chloride, potassium chloride **OR** potassium alternative oral replacement **OR** potassium chloride, magnesium sulfate, polyethylene glycol, acetaminophen **OR** acetaminophen, Polyvinyl Alcohol-Povidone PF, docusate sodium, glucose, dextrose bolus **OR** dextrose bolus, glucagon (rDNA), dextrose    cefTRIAXone (ROCEPHIN) IV  2,000 mg IntraVENous Q24H    sodium chloride flush  5-40 mL IntraVENous 2 times per day    lidocaine 1 % injection  50 mg IntraDERmal Once    heparin (porcine)  5,000 Units SubCUTAneous 3 times per day    gabapentin  300 mg Oral TID    sodium chloride flush  5-40 mL IntraVENous 2 times per day    sodium chloride flush  5-40 mL IntraVENous 2 times per day    ascorbic acid  500 mg Oral BID    aspirin  81 mg Oral Daily    atorvastatin  40 mg Oral Nightly    clopidogrel  75 mg Oral Daily    famotidine  40 mg Oral Daily    [Held by provider] ferrous sulfate  325 mg Oral Daily with breakfast    folic acid  1,000 mcg Oral Daily    insulin glargine  5 Units SubCUTAneous Nightly    insulin lispro  0-6 Units SubCUTAneous 4x Daily AC & HS    metoprolol succinate  12.5 mg Oral Daily    pantoprazole  40 mg Oral Daily    tamsulosin  0.4 mg Oral Nightly        PHYSICAL EXAM   /68   Pulse 90   Temp 98.8 °F (37.1 °C)   Resp 
VASCULAR SURGERY PROGRESS NOTE    Pt is being seen in f/u today regarding peripheral vascular disease and tissue loss    SUBJECTIVE  Pt seen/examined.  No acute events overnight.  Wound VAC holding suction.    CURRENT MEDICATIONS    sodium chloride      sodium chloride      sodium chloride      dextrose        sulfur hexafluoride microspheres, sodium chloride flush, sodium chloride, ondansetron **OR** ondansetron, tetrahydrozoline, benzocaine-menthol, benzonatate, calcium carbonate, hydrALAZINE, melatonin, sodium chloride, sodium chloride flush, sodium chloride, potassium chloride **OR** potassium alternative oral replacement **OR** potassium chloride, magnesium sulfate, polyethylene glycol, acetaminophen **OR** acetaminophen, Polyvinyl Alcohol-Povidone PF, docusate sodium, glucose, dextrose bolus **OR** dextrose bolus, glucagon (rDNA), dextrose, oxyCODONE-acetaminophen, fentanNYL    meropenem  1,000 mg IntraVENous Q8H    vancomycin  1,500 mg IntraVENous Q12H    sodium chloride flush  5-40 mL IntraVENous 2 times per day    fluconazole  400 mg Oral Daily    sodium chloride flush  5-40 mL IntraVENous 2 times per day    enoxaparin  40 mg SubCUTAneous Daily    ascorbic acid  500 mg Oral BID    aspirin  81 mg Oral Daily    atorvastatin  40 mg Oral Nightly    clopidogrel  75 mg Oral Daily    famotidine  40 mg Oral Daily    [Held by provider] ferrous sulfate  325 mg Oral Daily with breakfast    folic acid  1,000 mcg Oral Daily    gabapentin  300 mg Oral BID    insulin glargine  5 Units SubCUTAneous Nightly    insulin lispro  0-6 Units SubCUTAneous 4x Daily AC & HS    metoprolol succinate  12.5 mg Oral Daily    pantoprazole  40 mg Oral Daily    tamsulosin  0.4 mg Oral Nightly        PHYSICAL EXAM   /67   Pulse 84   Temp 98.4 °F (36.9 °C) (Infrared)   Resp 14   Ht 1.803 m (5' 11\")   Wt 70.6 kg (155 lb 10.3 oz)   SpO2 99%   BMI 21.71 kg/m²     Intake/Output Summary (Last 24 hours) at 5/20/2025 0701  Last data 
Vancomycin has been discontinued   Clinical Pharmacy to sign-off  Physician to re-consult pharmacy if future dosing is needed    Thank you for the consult,  Luke NielsonD, BCPS 5/22/2025 9:40 AM    
Vascular Surgery Progress Note    Pt is being seen in f/u today regarding PVD, R heel wound    Subjective  Pt s/e.   Resting in bed comfortably.  Had issues with IV access overnight.  Did not get pain IV pain meds.  Now has access and is using PCA.    Current Medications:    HYDROmorphone      sodium chloride      sodium chloride      dextrose        oxyCODONE **OR** [DISCONTINUED] oxyCODONE, naloxone 0.4 mg in 10 mL sodium chloride syringe, sulfur hexafluoride microspheres, sodium chloride flush, sodium chloride, ondansetron **OR** ondansetron, tetrahydrozoline, benzocaine-menthol, benzonatate, calcium carbonate, hydrALAZINE, melatonin, sodium chloride, sodium chloride flush, sodium chloride, potassium chloride **OR** potassium alternative oral replacement **OR** potassium chloride, magnesium sulfate, polyethylene glycol, acetaminophen **OR** acetaminophen, Polyvinyl Alcohol-Povidone PF, docusate sodium, glucose, dextrose bolus **OR** dextrose bolus, glucagon (rDNA), dextrose, fentanNYL    vancomycin  1,000 mg IntraVENous Q12H    heparin (porcine)  5,000 Units SubCUTAneous 3 times per day    gabapentin  300 mg Oral TID    sennosides-docusate sodium  1 tablet Oral BID    meropenem  1,000 mg IntraVENous Q8H    sodium chloride flush  5-40 mL IntraVENous 2 times per day    fluconazole  400 mg Oral Daily    sodium chloride flush  5-40 mL IntraVENous 2 times per day    ascorbic acid  500 mg Oral BID    aspirin  81 mg Oral Daily    atorvastatin  40 mg Oral Nightly    clopidogrel  75 mg Oral Daily    famotidine  40 mg Oral Daily    [Held by provider] ferrous sulfate  325 mg Oral Daily with breakfast    folic acid  1,000 mcg Oral Daily    insulin glargine  5 Units SubCUTAneous Nightly    insulin lispro  0-6 Units SubCUTAneous 4x Daily AC & HS    metoprolol succinate  12.5 mg Oral Daily    pantoprazole  40 mg Oral Daily    tamsulosin  0.4 mg Oral Nightly      PHYSICAL EXAM:    BP (!) 147/73   Pulse (!) 112   Temp 100 °F 
Vascular Surgery Progress Note    Pt is being seen in f/u today regarding PVD, R heel wound    Subjective  Pt s/e.   Resting in bed comfortably.  LYRIC today showed aortic valve vegetation.  Pt would like to proceed with right below knee amputation.     Current Medications:    sodium chloride      sodium chloride      dextrose        sulfur hexafluoride microspheres, sodium chloride flush, sodium chloride, ondansetron **OR** ondansetron, tetrahydrozoline, benzocaine-menthol, benzonatate, calcium carbonate, hydrALAZINE, melatonin, sodium chloride, sodium chloride flush, sodium chloride, potassium chloride **OR** potassium alternative oral replacement **OR** potassium chloride, magnesium sulfate, polyethylene glycol, acetaminophen **OR** acetaminophen, Polyvinyl Alcohol-Povidone PF, docusate sodium, glucose, dextrose bolus **OR** dextrose bolus, glucagon (rDNA), dextrose, oxyCODONE-acetaminophen, fentanNYL    sodium chloride flush  5-40 mL IntraVENous 2 times per day    enoxaparin  40 mg SubCUTAneous Daily    cefTRIAXone (ROCEPHIN) IV  2,000 mg IntraVENous Q24H    fluconazole  400 mg Oral Daily    sodium chloride flush  5-40 mL IntraVENous 2 times per day    enoxaparin  40 mg SubCUTAneous Daily    ascorbic acid  500 mg Oral BID    aspirin  81 mg Oral Daily    atorvastatin  40 mg Oral Nightly    clopidogrel  75 mg Oral Daily    famotidine  40 mg Oral Daily    [Held by provider] ferrous sulfate  325 mg Oral Daily with breakfast    folic acid  1,000 mcg Oral Daily    gabapentin  300 mg Oral BID    insulin glargine  5 Units SubCUTAneous Nightly    insulin lispro  0-6 Units SubCUTAneous 4x Daily AC & HS    metoprolol succinate  12.5 mg Oral Daily    pantoprazole  40 mg Oral Daily    tamsulosin  0.4 mg Oral Nightly      PHYSICAL EXAM:    BP (!) 111/58   Pulse 67   Temp 97.6 °F (36.4 °C) (Temporal)   Resp 18   Ht 1.803 m (5' 11\")   Wt 70.6 kg (155 lb 10.3 oz)   SpO2 95%   BMI 21.71 kg/m²     Intake/Output Summary (Last 
Vascular Surgery Progress Note    Pt is being seen in f/u today regarding PVD, R heel wound    Subjective  Pt s/e.   Resting in bed comfortably. Discharging later today    Current Medications:    sodium chloride      sodium chloride      sodium chloride      sodium chloride      dextrose        sodium chloride, sodium chloride flush, sodium chloride, HYDROmorphone **OR** HYDROmorphone, oxyCODONE-acetaminophen, sulfur hexafluoride microspheres, sodium chloride flush, sodium chloride, ondansetron **OR** ondansetron, tetrahydrozoline, benzocaine-menthol, benzonatate, calcium carbonate, hydrALAZINE, melatonin, sodium chloride, sodium chloride flush, sodium chloride, potassium chloride **OR** potassium alternative oral replacement **OR** potassium chloride, magnesium sulfate, polyethylene glycol, acetaminophen **OR** acetaminophen, Polyvinyl Alcohol-Povidone PF, docusate sodium, glucose, dextrose bolus **OR** dextrose bolus, glucagon (rDNA), dextrose    cefTRIAXone (ROCEPHIN) IV  2,000 mg IntraVENous Q24H    sodium chloride flush  5-40 mL IntraVENous 2 times per day    lidocaine 1 % injection  50 mg IntraDERmal Once    heparin (porcine)  5,000 Units SubCUTAneous 3 times per day    gabapentin  300 mg Oral TID    sodium chloride flush  5-40 mL IntraVENous 2 times per day    sodium chloride flush  5-40 mL IntraVENous 2 times per day    ascorbic acid  500 mg Oral BID    aspirin  81 mg Oral Daily    atorvastatin  40 mg Oral Nightly    clopidogrel  75 mg Oral Daily    famotidine  40 mg Oral Daily    [Held by provider] ferrous sulfate  325 mg Oral Daily with breakfast    folic acid  1,000 mcg Oral Daily    insulin glargine  5 Units SubCUTAneous Nightly    insulin lispro  0-6 Units SubCUTAneous 4x Daily AC & HS    metoprolol succinate  12.5 mg Oral Daily    pantoprazole  40 mg Oral Daily    tamsulosin  0.4 mg Oral Nightly      PHYSICAL EXAM:    BP (!) 140/75   Pulse 74   Temp 98.4 °F (36.9 °C)   Resp 16   Ht 1.803 m (5' 11\") 
Vascular Surgery Progress Note    Pt is being seen in f/u today regarding PVD, R heel wound    Subjective  Pt s/e.  No new complaints. Pain is well controlled.  Agreeable to angiogram tomorrow.     Current Medications:    sodium chloride      sodium chloride      dextrose        sodium chloride flush, sodium chloride, ondansetron **OR** ondansetron, tetrahydrozoline, benzocaine-menthol, benzonatate, calcium carbonate, hydrALAZINE, melatonin, sodium chloride, sodium chloride flush, sodium chloride, potassium chloride **OR** potassium alternative oral replacement **OR** potassium chloride, magnesium sulfate, polyethylene glycol, acetaminophen **OR** acetaminophen, Polyvinyl Alcohol-Povidone PF, docusate sodium, glucose, dextrose bolus **OR** dextrose bolus, glucagon (rDNA), dextrose, oxyCODONE-acetaminophen, fentanNYL    sodium chloride flush  5-40 mL IntraVENous 2 times per day    enoxaparin  40 mg SubCUTAneous Daily    cefTRIAXone (ROCEPHIN) IV  2,000 mg IntraVENous Q24H    fluconazole  400 mg Oral Daily    sodium chloride flush  5-40 mL IntraVENous 2 times per day    enoxaparin  40 mg SubCUTAneous Daily    ascorbic acid  500 mg Oral BID    aspirin  81 mg Oral Daily    atorvastatin  40 mg Oral Nightly    clopidogrel  75 mg Oral Daily    famotidine  40 mg Oral Daily    [Held by provider] ferrous sulfate  325 mg Oral Daily with breakfast    folic acid  1,000 mcg Oral Daily    gabapentin  300 mg Oral BID    insulin glargine  5 Units SubCUTAneous Nightly    insulin lispro  0-6 Units SubCUTAneous 4x Daily AC & HS    metoprolol succinate  12.5 mg Oral Daily    pantoprazole  40 mg Oral Daily    tamsulosin  0.4 mg Oral Nightly      PHYSICAL EXAM:    /63   Pulse 72   Temp 97.5 °F (36.4 °C)   Resp 16   Ht 1.803 m (5' 11\")   Wt 74.3 kg (163 lb 14.4 oz)   SpO2 97%   BMI 22.86 kg/m²     Intake/Output Summary (Last 24 hours) at 5/15/2025 1441  Last data filed at 5/15/2025 0621  Gross per 24 hour   Intake 641.66 ml 
Vascular Surgery Progress Note    Pt is being seen in f/u today regarding PVD, R heel wound    Subjective  Pt s/e.  No new complaints. Plan for OR today with podiatry for debridement.     Current Medications:    sodium chloride      dextrose        tetrahydrozoline, benzocaine-menthol, benzonatate, calcium carbonate, hydrALAZINE, melatonin, sodium chloride, sodium chloride flush, sodium chloride, potassium chloride **OR** potassium alternative oral replacement **OR** potassium chloride, magnesium sulfate, ondansetron **OR** ondansetron, polyethylene glycol, acetaminophen **OR** acetaminophen, Polyvinyl Alcohol-Povidone PF, docusate sodium, glucose, dextrose bolus **OR** dextrose bolus, glucagon (rDNA), dextrose, oxyCODONE-acetaminophen, fentanNYL    sodium chloride flush  5-40 mL IntraVENous 2 times per day    enoxaparin  40 mg SubCUTAneous Daily    ascorbic acid  500 mg Oral BID    aspirin  81 mg Oral Daily    atorvastatin  40 mg Oral Nightly    clopidogrel  75 mg Oral Daily    famotidine  40 mg Oral Daily    [Held by provider] ferrous sulfate  325 mg Oral Daily with breakfast    folic acid  1,000 mcg Oral Daily    gabapentin  300 mg Oral BID    insulin glargine  5 Units SubCUTAneous Nightly    insulin lispro  0-6 Units SubCUTAneous 4x Daily AC & HS    metoprolol succinate  12.5 mg Oral Daily    pantoprazole  40 mg Oral Daily    tamsulosin  0.4 mg Oral Nightly    cefepime  2,000 mg IntraVENous q8h      PHYSICAL EXAM:    BP (!) 122/52   Pulse 63   Temp 98 °F (36.7 °C) (Temporal)   Resp 19   Ht 1.803 m (5' 11\")   Wt 75.3 kg (166 lb)   SpO2 95%   BMI 23.15 kg/m²     Intake/Output Summary (Last 24 hours) at 5/14/2025 1144  Last data filed at 5/14/2025 0632  Gross per 24 hour   Intake 1075.42 ml   Output 995 ml   Net 80.42 ml        Gen awake, alert, in no apparent distress  CVS RRR  Resp  easy, nonlabored on RA  Abd soft, ndnt  R LE 2+ fem, weakly biphasic PT, monophasic DP   Heel wound  L LE  2+ fem, biphasic 
Vascular consult called to Dr De Jesus via perfect serve     Electronically signed by Mariann Rodríguez RN on 5/13/2025 at 1:14 PM    
[facility]  DAJUAN  Progress Note      Chief Complaint   Patient presents with    Blood Infection     Sent in from [facility] for positive blood cultures. Seen in ED recently for UTI. Has existing taylor catheter in. Already on IV antibiotics at rehab for endocarditis. PICC line present at triage.        SUBJECTIVE:    Patient is tolerating medications. No reported adverse drug reactions.  No nausea, vomiting, diarrhea. Discussed LYRIC results  Review of systems:  As stated above in the chief complaint, otherwise negative.    Medications:  Scheduled Meds:   vancomycin  1,000 mg IntraVENous Q12H    heparin (porcine)  5,000 Units SubCUTAneous 3 times per day    gabapentin  300 mg Oral TID    sennosides-docusate sodium  1 tablet Oral BID    meropenem  1,000 mg IntraVENous Q8H    sodium chloride flush  5-40 mL IntraVENous 2 times per day    fluconazole  400 mg Oral Daily    sodium chloride flush  5-40 mL IntraVENous 2 times per day    ascorbic acid  500 mg Oral BID    aspirin  81 mg Oral Daily    atorvastatin  40 mg Oral Nightly    clopidogrel  75 mg Oral Daily    famotidine  40 mg Oral Daily    [Held by provider] ferrous sulfate  325 mg Oral Daily with breakfast    folic acid  1,000 mcg Oral Daily    insulin glargine  5 Units SubCUTAneous Nightly    insulin lispro  0-6 Units SubCUTAneous 4x Daily AC & HS    metoprolol succinate  12.5 mg Oral Daily    pantoprazole  40 mg Oral Daily    tamsulosin  0.4 mg Oral Nightly     Continuous Infusions:   HYDROmorphone      sodium chloride      sodium chloride      dextrose       PRN Meds:oxyCODONE **OR** [DISCONTINUED] oxyCODONE, naloxone 0.4 mg in 10 mL sodium chloride syringe, sulfur hexafluoride microspheres, sodium chloride flush, sodium chloride, ondansetron **OR** ondansetron, tetrahydrozoline, benzocaine-menthol, benzonatate, calcium carbonate, hydrALAZINE, melatonin, sodium chloride, sodium chloride flush, sodium chloride, potassium 
[facility]  DAJUAN  Progress Note      Chief Complaint   Patient presents with    Blood Infection     Sent in from [facility] for positive blood cultures. Seen in ED recently for UTI. Has existing taylor catheter in. Already on IV antibiotics at rehab for endocarditis. PICC line present at triage.        SUBJECTIVE:    Patient is tolerating medications. No reported adverse drug reactions.  No nausea, vomiting, diarrhea. RN in room.   Review of systems:  As stated above in the chief complaint, otherwise negative.    Medications:  Scheduled Meds:   sodium chloride flush  5-40 mL IntraVENous 2 times per day    enoxaparin  40 mg SubCUTAneous Daily    cefTRIAXone (ROCEPHIN) IV  2,000 mg IntraVENous Q24H    fluconazole  400 mg Oral Daily    sodium chloride flush  5-40 mL IntraVENous 2 times per day    enoxaparin  40 mg SubCUTAneous Daily    ascorbic acid  500 mg Oral BID    aspirin  81 mg Oral Daily    atorvastatin  40 mg Oral Nightly    clopidogrel  75 mg Oral Daily    famotidine  40 mg Oral Daily    [Held by provider] ferrous sulfate  325 mg Oral Daily with breakfast    folic acid  1,000 mcg Oral Daily    gabapentin  300 mg Oral BID    insulin glargine  5 Units SubCUTAneous Nightly    insulin lispro  0-6 Units SubCUTAneous 4x Daily AC & HS    metoprolol succinate  12.5 mg Oral Daily    pantoprazole  40 mg Oral Daily    tamsulosin  0.4 mg Oral Nightly     Continuous Infusions:   sodium chloride      sodium chloride      dextrose       PRN Meds:sodium chloride flush, sodium chloride, ondansetron **OR** ondansetron, tetrahydrozoline, benzocaine-menthol, benzonatate, calcium carbonate, hydrALAZINE, melatonin, sodium chloride, sodium chloride flush, sodium chloride, potassium chloride **OR** potassium alternative oral replacement **OR** potassium chloride, magnesium sulfate, polyethylene glycol, acetaminophen **OR** acetaminophen, Polyvinyl Alcohol-Povidone PF, docusate sodium, 
Wound care; Podiatry following for the right heel wound and managing dressing, per patient nurse no other need for wound care to see patient. Wound care will sign off, re-consult if needed. Rehana Carbajal RN    
Reactions    Pcn [Penicillins] Anaphylaxis       /66   Pulse 67   Temp 98.1 °F (36.7 °C) (Oral)   Resp 18   Ht 1.803 m (5' 11\")   Wt 70.6 kg (155 lb 10.3 oz)   SpO2 97%   BMI 21.71 kg/m²       EXAM:  Dressing is clean, dry, and intact.    PREVIOUS EXAM:  VASCULAR:  DP and PT pulses are weak. CFT < 5 seconds B/L.  Warm to warm from the tibial tuberosity to the distal aspect of the digits dorsally. Hair growth is not noted to the distal aspects dorsally.    NEUROLOGIC:  Light touch present    MUSCULOSKELETAL:  Deformities are Absent . 5/5 Gross Muscle strength in all 4 quadrants.    DERM:  Skin is not intact to the bilateral lower extremities. Edema is absent. Erythema is Present. No Hyperkeratotic tissue noted.  Webspaces 1-4 b/l are C/D/I. Wound on posterior right heel is granular at wound bed with clean boarders and some bleeding present on dressing removal. No malodor, fluctuance, crepitance, or other acute signs of infection present at this time.           Scheduled Meds:   sodium chloride flush  5-40 mL IntraVENous 2 times per day    enoxaparin  40 mg SubCUTAneous Daily    cefTRIAXone (ROCEPHIN) IV  2,000 mg IntraVENous Q24H    fluconazole  400 mg Oral Daily    sodium chloride flush  5-40 mL IntraVENous 2 times per day    enoxaparin  40 mg SubCUTAneous Daily    ascorbic acid  500 mg Oral BID    aspirin  81 mg Oral Daily    atorvastatin  40 mg Oral Nightly    clopidogrel  75 mg Oral Daily    famotidine  40 mg Oral Daily    [Held by provider] ferrous sulfate  325 mg Oral Daily with breakfast    folic acid  1,000 mcg Oral Daily    gabapentin  300 mg Oral BID    insulin glargine  5 Units SubCUTAneous Nightly    insulin lispro  0-6 Units SubCUTAneous 4x Daily AC & HS    metoprolol succinate  12.5 mg Oral Daily    pantoprazole  40 mg Oral Daily    tamsulosin  0.4 mg Oral Nightly     Continuous Infusions:   sodium chloride      sodium chloride      dextrose       PRN Meds:.sulfur hexafluoride microspheres, 
oxyCODONE-acetaminophen, fentanNYL    Allergies   Allergen Reactions    Pcn [Penicillins] Anaphylaxis       BP (!) 118/56   Pulse 57   Temp 97.2 °F (36.2 °C)   Resp 16   Ht 1.803 m (5' 11\")   Wt 74.3 kg (163 lb 14.4 oz)   SpO2 98%   BMI 22.86 kg/m²       EXAM:    VASCULAR:  DP and PT pulses are weak. CFT < 5 seconds B/L.  Warm to warm from the tibial tuberosity to the distal aspect of the digits dorsally. Hair growth is not noted to the distal aspects dorsally.    NEUROLOGIC:  Light touch present    MUSCULOSKELETAL:  Deformities are Absent . 5/5 Gross Muscle strength in all 4 quadrants.    DERM:  Skin is not intact to the bilateral lower extremities. Edema is absent. Erythema is Present. No Hyperkeratotic tissue noted.  Webspaces 1-4 b/l are C/D/I. Wound on posterior right heel is granular at wound bed with clean boarders and some bleeding present on dressing removal. No malodor, fluctuance, crepitance, or other acute signs of infection present at this time.           Scheduled Meds:   sodium chloride flush  5-40 mL IntraVENous 2 times per day    enoxaparin  40 mg SubCUTAneous Daily    sodium chloride flush  5-40 mL IntraVENous 2 times per day    enoxaparin  40 mg SubCUTAneous Daily    ascorbic acid  500 mg Oral BID    aspirin  81 mg Oral Daily    atorvastatin  40 mg Oral Nightly    clopidogrel  75 mg Oral Daily    famotidine  40 mg Oral Daily    [Held by provider] ferrous sulfate  325 mg Oral Daily with breakfast    folic acid  1,000 mcg Oral Daily    gabapentin  300 mg Oral BID    insulin glargine  5 Units SubCUTAneous Nightly    insulin lispro  0-6 Units SubCUTAneous 4x Daily AC & HS    metoprolol succinate  12.5 mg Oral Daily    pantoprazole  40 mg Oral Daily    tamsulosin  0.4 mg Oral Nightly    cefepime  2,000 mg IntraVENous q8h     Continuous Infusions:   sodium chloride      sodium chloride      dextrose       PRN Meds:.sodium chloride flush, sodium chloride, ondansetron **OR** ondansetron, 
05/18/2025 05:59 AM    GFRAA >60 09/26/2022 06:24 AM    LABGLOM >90 05/20/2025 06:44 AM    GLUCOSE 107 05/19/2025 07:28 AM    CALCIUM 9.0 05/19/2025 07:28 AM    BILITOT 0.4 05/13/2025 06:25 AM    ALKPHOS 211 05/13/2025 06:25 AM    AST 30 05/13/2025 06:25 AM    ALT 23 05/13/2025 06:25 AM     Lab Results   Component Value Date    CRP 63.8 (H) 05/13/2025    CRP 24.9 (H) 09/13/2022    CRP 2.3 (H) 02/14/2021     Lab Results   Component Value Date    SEDRATE 70 (H) 05/13/2025    SEDRATE 132 (H) 09/26/2022    SEDRATE 125 (H) 09/23/2022     Radiology:      Microbiology:   Lab Results   Component Value Date/Time    BC 5 Days no growth 09/15/2022 07:24 AM    BC 5 Days no growth 02/16/2021 04:37 PM    BC Previously positive blood culture called 02/14/2021 07:07 PM    BC  02/14/2021 07:07 PM     Refer to previous culture for susceptibility results  of CXBL2 (LAC) collected 02/14/2021 at 19:07      ORG Anaerobic gram negative justo 09/14/2022 04:41 PM    ORG Anaerobic gram positive cocci 09/14/2022 04:41 PM    ORG BHS Group A (Strep pyogenes) 09/13/2022 02:59 PM    ORG BHS Group A (Strep pyogenes) 09/13/2022 02:59 PM    ORG Staphylococcus aureus 09/13/2022 02:59 PM    ORG Strep pyogenes (Beta Strep Group A) 09/13/2022 02:59 PM     Lab Results   Component Value Date/Time    BLOODCULT2 5 Days no growth 09/15/2022 07:25 AM    BLOODCULT2 Previously positive blood culture called 09/13/2022 02:59 PM    BLOODCULT2  09/13/2022 02:59 PM     Refer to previous culture CXBL 9/13/2022 1459 for susceptibility results    ORG Anaerobic gram negative justo 09/14/2022 04:41 PM    ORG Anaerobic gram positive cocci 09/14/2022 04:41 PM    ORG BHS Group A (Strep pyogenes) 09/13/2022 02:59 PM    ORG BHS Group A (Strep pyogenes) 09/13/2022 02:59 PM    ORG Staphylococcus aureus 09/13/2022 02:59 PM    ORG Strep pyogenes (Beta Strep Group A) 09/13/2022 02:59 PM     WOUND/ABSCESS   Date Value Ref Range Status   09/13/2022 (A)  Final    Mixed laura isolated. 
31.2* 31.8* 29.7*   RDW 16.1* 16.0* 16.3*    352 348   MPV 9.6 9.5 10.2       I/O last 3 completed shifts:  In: 600 [P.O.:600]  Out: 1050 [Urine:1050]  No intake/output data recorded.    02/23/25    LYRIC (PRN CONTRAST/BUBBLE/3D) 02/26/2025  5:44 PM (Final)    Interpretation Summary    Left Ventricle: Normal left ventricular systolic function with a visually estimated EF of 60 - 65%. Normal wall motion.    Aortic Valve: Trileaflet valve. Mildly calcified cusps. Mild regurgitation. Moderate stenosis of the aortic valve. AV mean gradient is 9 mmHg. AV peak velocity is 1.9 m/s. LVOT:AV VTI Index is 0.45. AV area by continuity VTI is 2.0 cm2. AV area by peak velocity is 1.9 cm2. AV Stroke Volume index is 46.9 mL/m2.  Aortic valve area by 3D planimetry 1.6 cm².    Left Atrium: Left atrium is dilated. No left atrial appendage thrombus noted.    Aorta: Dilated aortic root.    Signed by: Hernandez Edwards MD on 2/26/2025  5:44 PM      Principal Problem:    Bacteremia  Active Problems:    Coronary artery disease involving native coronary artery of native heart without angina pectoris    Hypertension    Hyperlipidemia    Coronary artery disease due to lipid rich plaque    PAD (peripheral artery disease)    Type 2 diabetes mellitus with hyperglycemia, without long-term current use of insulin ([facility])  Resolved Problems:    * No resolved hospital problems. *      SYNOPSIS:  64 y.o. male with past medical history that includes CVA with residual left hemiparesis, diabetes, hypertension, hyperlipidemia, coronary artery disease presented to the ER from nursing facility with positive blood cultures. He was in the ER 5 days ago where he was diagnosed with complicated catheter associated UTI and was started on cefdinir. His blood cultures came back positive and he was referred to return to the ER for admission. Blood cultures grew strep agalactiae. He has history of recent CABG 4/17/2025.  He does also have a foot wound which she 
Further workup and sensitivity testing  is not routinely indicated and will not be performed.  Mixed laura isolated includes:  Mixed Gram negative rods  Gram positive rods catalase negative     09/13/2022   Final    Heavy growth  This isolate is presumed to be resistant based on the  detection of inducible Clindamycin resistance.  Clindamycin  may still be effective in some patients.     09/13/2022 Heavy growth  Final     Smear, Respiratory   Date Value Ref Range Status   01/01/2021   Final    Group 6: <25 PMN's/LPF and <25 Epithelial cells/LPF  Rare Polymorphonuclear leukocytes  Epithelial cells not seen  No organisms seen           Component Value Date/Time    MPNEUMO Not Detected 05/09/2025 0951    AFBCX No growth after 6 weeks of incubation. 09/14/2022 1641    AFBCX No growth after 6 weeks of incubation. 02/16/2021 0732    AFBCX No growth after 6 weeks of incubation. 02/16/2021 0730    FUNGSM No Fungal elements seen 09/14/2022 1641    LABFUNG No growth after 4 weeks of incubation. 09/14/2022 1641    LABFUNG No growth after 4 weeks of incubation. 02/16/2021 0732    LABFUNG No growth after 4 weeks of incubation. 02/16/2021 0730     CULTURE, RESPIRATORY   Date Value Ref Range Status   01/01/2021 Oral Pharyngeal Laura absent  Growth not present    Final     No results found for: \"CXCATHTIP\"  No results found for: \"BFCS\"  Culture Surgical   Date Value Ref Range Status   09/14/2022 Only Anaerobes Isolated  Corrected   02/16/2021 Rare growth  Final   02/16/2021 Light growth  Final   02/16/2021   Final    Moderate growth  Refer to previous culture for susceptibility results  of CXSUR FOOT (Bone) collected 02/16/2021 at 07:32     02/16/2021   Final    Moderate growth  This isolate is presumed to be resistant based on the  detection of inducible Clindamycin resistance.  Clindamycin  may still be effective in some patients.     02/16/2021 Moderate growth  Final     Urine Culture, Routine   Date Value Ref Range Status 
dependent position 1st time post-op.  Mild Relief w/ Rest and Repositioning, Nsg Notified   Additional Complaints:  None    Cognition: A & O x 4   Able to Follow Multi-Step Commands INDly   Memory:  good    Sequencing:  good    Problem solving:  good    Judgement/safety:  good (-)   Communication:  good    Insight:  good   (-)  Additional Comments:  Pt was pleasant and cooperative.      Vitals/Lab Values:  WFL Room air       Functional Assessment:  AM-PAC Daily Activity Raw Score: 12/24     Initial Eval Status  Date: 5-15-25   Treatment Status  Date: STGs = LTGs  Time frame: 10-14 days   Feeding IND after set up      NA   Grooming SUP/set up    Washing hands, face, completing oral care while seated EOB, assist to open some containers of toiletries  Unable to tolerate ambulating to or standing at sink    Sup/set up  Seated/Standing at the Sink   UB Dressing Min A/set up    Seated EOB  Pt ed fro safety/adaptive techs    Set up     LB Dressing Dep    Max A donning footwear, pants in supine,  Pt ed for safe/adaptive techs, use of adaptive equip, adherence to NWB status of R LE - pt declined to attempt to stand for tasks    Mod A/set up     Bathing Dep    Max A for LB in supine,  Pt ed for safe/adaptive techs, use of adaptive equip, adherence to NWB status of R LE    Mod A/set up      Toileting Dep    Max A Hygiene, Clothing adjustment in supine  Pt ed for safe/adaptive techs, adherence to NWB status of R LE    Mod A     Bed Mobility  Supine to sit: Min A   Sit to supine:  Mod A     VCs for safe/adaptive techs    Supine to sit: IND  Sit to supine: IND     Functional Transfers NT    Pt declined attempt to stand despite much encouragement and education re: Precautions, reassurance of assist from 2 therapists   Pt ed for safety/hand placement    Mod A     Functional Mobility NT    Mod A     Balance Sitting:     Static:  SUP    Dynamic:  SUP w/ functional ax EOB     Standing:     Static:  NT    Dynamic:  NT     Sitting:     
  baseline ESR, CRP  Podiatry to take to OR  Plan for angiogram by vascular 05/16  Check tte  Can leave midline for now  Monitor labs  Will follow with you    Adan Rushing MD  1:03 PM  5/14/2025  
anemia  Postop anemia likely secondary to blood loss  Transfuse as indicated, keep hemoglobin more than 7g/dl    DISPOSITION: Pending the above                                                                 This note was generated by the epic EMR system/Dragon speech recognition and may contain inherent errors or omissions not intended by the user. Grammatical errors, random word insertions, deletions, pronoun errors and incomplete sentences are occasional consequences of this technology due to software limitations. Not all errors are caught and corrected. If there are questions or concerns about the content of this note or information contained within the body of this dictation they should be addressed directly with the author for clarification.    Electronically signed by Jp Rosales MD on 5/22/2025 at 1:59 PM      
of this note or information contained within the body of this dictation they should be addressed directly with the author for clarification.    Electronically signed by Jp Rosales MD on 5/20/2025 at 2:15 PM      
resistance.  Clindamycin  may still be effective in some patients.     02/16/2021 Moderate growth  Final     Urine Culture, Routine   Date Value Ref Range Status   02/14/2021 >100,000 CFU/ml  Final     MRSA Culture Only   Date Value Ref Range Status   12/26/2020 Methicillin resistant Staph aureus not isolated  Final   Blood cx neg 5/12    Assessment:  Strep agalactiae bacteremia with penicillin ANNELIESE <0.06  Right heel wound  S/p 5/14 Excisional wound debridement of right heel wound to and through level of deep fascia and muscle, total measurement 6 cm x 4.5 cm x 0.5 cm in dimension. 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 Above discussed agree with advanced practice nurse. Labs, cultures, and radiographs reviewed.  Face to Face encounter occurred. Changes made as necessary.     Caleb Farooq MD

## 2025-05-23 NOTE — DISCHARGE SUMMARY
Hospitalist Discharge Summary    Patient ID: Jerome Steel   Patient : 1960  Patient's PCP: Genet Kraus DO    Admit Date: 2025   Admitting Physician: Aleyda Cheek DO    Discharge Date:  2025   Discharge Physician: Jp Rosales MD   Discharge Condition: Stable  Discharge Disposition: Skilled Facility      Hospital course in brief:  (Please refer to daily progress notes for a comprehensive review of the hospitalization by requesting medical records)    64 y.o. male with past medical history that includes CVA with residual left hemiparesis, diabetes, hypertension, hyperlipidemia, coronary artery disease presented to the ER from nursing facility with positive blood cultures. He was in the ER 5 days ago where he was diagnosed with complicated catheter associated UTI and was started on cefdinir. His blood cultures came back positive and he was referred to return to the ER for admission. Blood cultures grew strep agalactiae. He has history of recent CABG 2025.  He does also have a foot wound which she follows with podiatry.  He also has a Cole catheter that is present for urinary retention. ID was consulted and he was admitted to the hospital       planned for right BKA.  CT surgery was consulted for AV visitation.  No surgical intervention planned, not a surgical candidate.  LYRIC reviewed.  LVEF 60 to 65%.  There is a 0.9 x 0.3 cm vegetation on aortic valve.      doing well on PCA, pain adequately controlled, pending PICC line, pending Cx       patient continues to do well, off patient controlled analgesic, pain is well-controlled, s/p 1 unit PRBC.  PICC line ordered.  Anticipate discharge tomorrow  s/p PICC line placement, pain is adequately controlled, remains stable for discharge to SNF.  Follow-up with podiatry, vascular surgery, ID postdischarge.  ID has recommended 6 weeks of IV Rocephin 2 g daily till 2025.      Consults:   IP CONSULT TO

## 2025-05-24 NOTE — CARE COORDINATION
5/24/2025THIS SW ACCESSED CHART TO COMPLETE PASAR REQUESTED BY [facility] (LIAISON NOTIFIED).  Electronically signed by ANGELO Larkin on 5/24/2025 at 9:20 AM

## 2025-05-25 LAB
MICROORGANISM SPEC CULT: NORMAL
MICROORGANISM/AGENT SPEC: NORMAL
SERVICE CMNT-IMP: NORMAL
SPECIMEN DESCRIPTION: NORMAL

## 2025-05-27 ENCOUNTER — TELEPHONE (OUTPATIENT)
Dept: CARDIOTHORACIC SURGERY | Age: 65
End: 2025-05-27

## 2025-05-27 NOTE — TELEPHONE ENCOUNTER
Called pt to reschedule post op appointment due to pt no showing for today's appointment there was no answer.

## 2025-05-30 LAB
MICROORGANISM SPEC CULT: NORMAL
MICROORGANISM SPEC CULT: NORMAL
MICROORGANISM/AGENT SPEC: NORMAL
MICROORGANISM/AGENT SPEC: NORMAL
SERVICE CMNT-IMP: NORMAL
SERVICE CMNT-IMP: NORMAL
SPECIMEN DESCRIPTION: NORMAL
SPECIMEN DESCRIPTION: NORMAL

## 2025-05-31 PROBLEM — E11.9 DIABETES MELLITUS, TYPE II (MULTI): Status: ACTIVE | Noted: 2025-05-31

## 2025-05-31 PROBLEM — E11.9 DIABETES MELLITUS, TYPE II (MULTI): Status: RESOLVED | Noted: 2025-05-31 | Resolved: 2025-05-31

## 2025-06-04 NOTE — PROGRESS NOTES
for 1 year post operatively  Close follow up with ID for AV veg  Continue follow up with PCP, Cardiology as scheduled.   Encouraged to call office with any questions, concerns.  Otherwise no further follow up necessary from CTS standpoint.

## 2025-06-06 ENCOUNTER — APPOINTMENT (OUTPATIENT)
Dept: CARDIOLOGY | Facility: CLINIC | Age: 65
End: 2025-06-06
Payer: MEDICARE

## 2025-06-06 ENCOUNTER — TELEPHONE (OUTPATIENT)
Dept: VASCULAR SURGERY | Age: 65
End: 2025-06-06

## 2025-06-10 ENCOUNTER — LAB REQUISITION (OUTPATIENT)
Dept: LAB | Facility: HOSPITAL | Age: 65
End: 2025-06-10
Payer: COMMERCIAL

## 2025-06-10 ENCOUNTER — OFFICE VISIT (OUTPATIENT)
Dept: CARDIOTHORACIC SURGERY | Age: 65
End: 2025-06-10

## 2025-06-10 VITALS
BODY MASS INDEX: 23.24 KG/M2 | SYSTOLIC BLOOD PRESSURE: 118 MMHG | HEIGHT: 71 IN | OXYGEN SATURATION: 98 % | HEART RATE: 78 BPM | WEIGHT: 166 LBS | DIASTOLIC BLOOD PRESSURE: 66 MMHG

## 2025-06-10 DIAGNOSIS — Z95.1 S/P CABG (CORONARY ARTERY BYPASS GRAFT): Primary | ICD-10-CM

## 2025-06-10 DIAGNOSIS — D72.829 ELEVATED WHITE BLOOD CELL COUNT, UNSPECIFIED: ICD-10-CM

## 2025-06-10 PROCEDURE — 87075 CULTR BACTERIA EXCEPT BLOOD: CPT

## 2025-06-10 PROCEDURE — 87205 SMEAR GRAM STAIN: CPT

## 2025-06-10 PROCEDURE — 99024 POSTOP FOLLOW-UP VISIT: CPT | Performed by: THORACIC SURGERY (CARDIOTHORACIC VASCULAR SURGERY)

## 2025-06-10 PROCEDURE — 87181 SC STD AGAR DILUTION PER AGT: CPT

## 2025-06-10 PROCEDURE — 87077 CULTURE AEROBIC IDENTIFY: CPT

## 2025-06-10 PROCEDURE — 87040 BLOOD CULTURE FOR BACTERIA: CPT

## 2025-06-16 ENCOUNTER — HOSPITAL ENCOUNTER (OUTPATIENT)
Dept: WOUND CARE | Age: 65
Discharge: HOME | End: 2025-06-16
Attending: PODIATRIST | Admitting: PODIATRIST
Payer: COMMERCIAL

## 2025-06-16 VITALS
WEIGHT: 166 LBS | HEIGHT: 71 IN | SYSTOLIC BLOOD PRESSURE: 112 MMHG | DIASTOLIC BLOOD PRESSURE: 64 MMHG | TEMPERATURE: 98.1 F | BODY MASS INDEX: 23.24 KG/M2 | RESPIRATION RATE: 16 BRPM | HEART RATE: 84 BPM

## 2025-06-16 PROCEDURE — 99213 OFFICE O/P EST LOW 20 MIN: CPT

## 2025-06-16 RX ORDER — SENNA AND DOCUSATE SODIUM 50; 8.6 MG/1; MG/1
1 TABLET, FILM COATED ORAL DAILY
COMMUNITY

## 2025-06-16 RX ORDER — POTASSIUM CHLORIDE 1.5 G/1.58G
20 POWDER, FOR SOLUTION ORAL 2 TIMES DAILY
COMMUNITY

## 2025-06-16 RX ORDER — ACETAMINOPHEN 325 MG/1
650 TABLET ORAL EVERY 6 HOURS PRN
COMMUNITY

## 2025-06-16 RX ORDER — OXYCODONE AND ACETAMINOPHEN 5; 325 MG/1; MG/1
1 TABLET ORAL EVERY 4 HOURS PRN
COMMUNITY

## 2025-06-16 RX ORDER — HYDROXYZINE HYDROCHLORIDE 50 MG/1
50 TABLET, FILM COATED ORAL 3 TIMES DAILY PRN
COMMUNITY

## 2025-06-16 RX ORDER — ESCITALOPRAM OXALATE 5 MG/1
5 TABLET ORAL DAILY
COMMUNITY

## 2025-06-16 NOTE — DISCHARGE INSTRUCTIONS
Visit Discharge/Physician Orders    Discharge condition: Stable    Assessment of pain at discharge:    Anesthetic used:     Discharge to: ECF    Left via:Private automobile    Accompanied by: accompanied by self    [facility]     Dressing Orders: in clinic: Cleanse wound to right BKA with normal saline, apply xeroform to wound bed and cover with ABD pad and roll gauze- adhere with tape.    AT FACILITY: continue to follow dressing orders as previously ordered from PK.    Treatment Orders: follow up with Dr. Harrell in private office for routine care   Follow up with PK as scheduled in private office  EAT DIET WITH PROTEINS AND VITAMIN C  TAKE A MULTIVITAMIN DAILY IF NOT CONTRAINDICATED      [facility] followup visit ____________as needed_________________  (Please note your next appointment above and if you are unable to keep, kindly give a 24 hour notice. Thank you.)    Physician signature:__________________________      If you experience any of the following, please call the Wound Care Center during business hours:    * Increase in Pain  * Temperature over 101  * Increase in drainage from your wound  * Drainage with a foul odor  * Bleeding  * Increase in swelling  * Need for compression bandage changes due to slippage, breakthrough drainage.    If you need medical attention outside of the business hours of the Wound Care Centers please contact your PCP or go to the nearest emergency room.

## 2025-06-16 NOTE — PROGRESS NOTES
capsule 1    ferrous sulfate (IRON 325) 325 (65 Fe) MG tablet Take 1 tablet by mouth daily (with breakfast) 90 tablet 1    folic acid (FOLVITE) 1 MG tablet Take 1 tablet by mouth daily 30 tablet 5     No current facility-administered medications on file prior to encounter.       REVIEW OF SYSTEMS   ROS : All others Negative if blank [], Positive if [x]  General Vascular   [] Fevers [] Claudication   [] Chills [] Rest Pain   Skin Neurologic   [] Tissue Loss [x] Lower extremity neuropathy     Objective:    /64   Pulse 84   Temp 98.1 °F (36.7 °C) (Temporal)   Resp 16   Ht 1.803 m (5' 11\")   Wt 75.3 kg (166 lb)   BMI 23.15 kg/m²   Wt Readings from Last 3 Encounters:   06/16/25 75.3 kg (166 lb)   06/10/25 75.3 kg (166 lb)   05/18/25 70.6 kg (155 lb 10.3 oz)       PHYSICAL EXAM   CONSTITUTIONAL:   Awake, alert, cooperative   PSYCHIATRIC :  Oriented to time, place and person      Normal insight to disease process  EXTREMITIES:   R LE Open wounds are noted   Skin color is normal   Edema is  noted   Sensation intact to light touch   R BKA  L LE Open wounds are not noted   Skin color is normal   Edema is  noted   Sensation deficit noted - to foot   Palpation of the foot does not cause pain   5/5 strength DF/PF  R dorsalis pedis N/A L dorsalis pedis +1   R posterior tibial N/A L posterior tibial +1     Assessment:     Problem List Items Addressed This Visit    None      Pre Debridement Measurements:  Are located in the Wound/Ulcer Documentation Flow Sheet  Post Debridement Measurements:  Wound/Ulcer Descriptions are Pre Debridement except measurements:     Negative Pressure Wound Therapy Leg Lower;Proximal;Right;Anterior (Active)   Dressing Status Intact w/seal 05/23/25 0900   Canister changed? No 05/23/25 0900   Output (ml) 0 ml 05/22/25 2015   Mode Continuous 05/23/25 0900   Target Pressure (mmHg) 125 05/23/25 0900   Number of days: 26       Wound 06/16/25 Leg Right;Lower #1 BKA (Active)   Wound Image   06/16/25

## 2025-06-18 LAB
BACTERIA BLD AEROBE CULT: ABNORMAL
BACTERIA BLD AEROBE CULT: ABNORMAL
BACTERIA BLD CULT: ABNORMAL
BACTERIA BLD CULT: ABNORMAL
GRAM STN SPEC: ABNORMAL
GRAM STN SPEC: ABNORMAL

## 2025-06-23 ENCOUNTER — OFFICE VISIT (OUTPATIENT)
Dept: VASCULAR SURGERY | Age: 65
End: 2025-06-23
Payer: COMMERCIAL

## 2025-06-23 DIAGNOSIS — I73.9 PERIPHERAL VASCULAR DISEASE, UNSPECIFIED: Primary | ICD-10-CM

## 2025-06-23 DIAGNOSIS — Z89.511 S/P BELOW KNEE AMPUTATION, RIGHT (HCC): ICD-10-CM

## 2025-06-23 PROCEDURE — 99024 POSTOP FOLLOW-UP VISIT: CPT

## 2025-06-23 PROCEDURE — L8440 SHRINKER BELOW KNEE: HCPCS

## 2025-06-23 RX ORDER — DOCUSATE SODIUM 100 MG/1
100 CAPSULE, LIQUID FILLED ORAL 2 TIMES DAILY
COMMUNITY

## 2025-06-23 RX ORDER — BISACODYL 5 MG/1
5 TABLET, DELAYED RELEASE ORAL DAILY PRN
COMMUNITY

## 2025-06-23 NOTE — PROGRESS NOTES
6/23/2025    Jerome Steel  1960    Previous Procedures  5/31/22 L LE angiogram  L SFA, Pop atherectomy/plasty  L PT plasty   5/16/25 R LE angiogram - unable to cross occlusion   5/20/25 R BKA     Patient returns for post operative evaluation status post right below knee amputation.   The patient denies any unexpected problems since the procedure.  He remains at the nursing facility however states he has not been getting physical therapy.      Physical Exam:    Gen Awake and Alert  [facility] RRR    Right LE    BKA incision healed, staples removed  Left LE       No wounds          Right      Left   Femoral         Dorsalis Pedis BKA biphasic   Posterior Tibial BKA biphasic     A/P PVD S/P R BKA  Continue Medical management with ASA, plavix, statin   I reviewed with the patient that normal activities can be resumed as tolerated  I will send a script for  to [facility]  Pt should be working with therapy at the rehab  Return in 6 months with repeat lower arterial studies  Call sooner with any new issues    Pt seen and plan reviewed with Dr. Brenda Ramirez, APRN - CNP

## 2025-07-01 ENCOUNTER — LAB REQUISITION (OUTPATIENT)
Dept: LAB | Facility: HOSPITAL | Age: 65
End: 2025-07-01
Payer: COMMERCIAL

## 2025-07-01 DIAGNOSIS — I38 ENDOCARDITIS, VALVE UNSPECIFIED: ICD-10-CM

## 2025-07-01 PROCEDURE — 87075 CULTR BACTERIA EXCEPT BLOOD: CPT

## 2025-07-01 PROCEDURE — 87040 BLOOD CULTURE FOR BACTERIA: CPT

## 2025-07-05 DIAGNOSIS — I10 PRIMARY HYPERTENSION: ICD-10-CM

## 2025-07-06 LAB — BACTERIA BLD CULT: NORMAL

## 2025-07-07 ENCOUNTER — APPOINTMENT (OUTPATIENT)
Dept: GENERAL RADIOLOGY | Age: 65
DRG: 871 | End: 2025-07-07
Payer: COMMERCIAL

## 2025-07-07 ENCOUNTER — HOSPITAL ENCOUNTER (INPATIENT)
Age: 65
LOS: 3 days | Discharge: NURSING, REHAB OR LONG-TERM CARE | DRG: 871 | End: 2025-07-10
Attending: EMERGENCY MEDICINE | Admitting: INTERNAL MEDICINE
Payer: COMMERCIAL

## 2025-07-07 ENCOUNTER — APPOINTMENT (OUTPATIENT)
Dept: CT IMAGING | Age: 65
DRG: 871 | End: 2025-07-07
Payer: COMMERCIAL

## 2025-07-07 DIAGNOSIS — K52.9 COLITIS: ICD-10-CM

## 2025-07-07 DIAGNOSIS — D72.829 LEUKOCYTOSIS, UNSPECIFIED TYPE: Primary | ICD-10-CM

## 2025-07-07 DIAGNOSIS — I38 OTHER ENDOCARDITIS, UNSPECIFIED CHRONICITY: ICD-10-CM

## 2025-07-07 PROBLEM — E86.0 DEHYDRATION: Status: ACTIVE | Noted: 2025-07-07

## 2025-07-07 PROBLEM — E11.9 CONTROLLED TYPE 2 DIABETES MELLITUS WITHOUT COMPLICATION (HCC): Status: ACTIVE | Noted: 2025-07-07

## 2025-07-07 PROBLEM — E87.20 ACIDOSIS: Status: ACTIVE | Noted: 2025-07-07

## 2025-07-07 LAB
ALBUMIN SERPL-MCNC: 2.8 G/DL (ref 3.5–5.2)
ALP SERPL-CCNC: 234 U/L (ref 40–129)
ALT SERPL-CCNC: 6 U/L (ref 0–50)
ANION GAP SERPL CALCULATED.3IONS-SCNC: 21 MMOL/L (ref 7–16)
AST SERPL-CCNC: 12 U/L (ref 0–50)
BACTERIA URNS QL MICRO: ABNORMAL
BASOPHILS # BLD: 0 K/UL (ref 0–0.2)
BASOPHILS NFR BLD: 0 % (ref 0–2)
BILIRUB SERPL-MCNC: 0.5 MG/DL (ref 0–1.2)
BILIRUB UR QL STRIP: NEGATIVE
BUN SERPL-MCNC: 29 MG/DL (ref 8–23)
CALCIUM SERPL-MCNC: 8.9 MG/DL (ref 8.8–10.2)
CHLORIDE SERPL-SCNC: 95 MMOL/L (ref 98–107)
CLARITY UR: CLEAR
CO2 SERPL-SCNC: 17 MMOL/L (ref 22–29)
COLOR UR: YELLOW
CREAT SERPL-MCNC: 1 MG/DL (ref 0.7–1.2)
CRYSTALS URNS MICRO: ABNORMAL /HPF
EOSINOPHIL # BLD: 0.4 K/UL (ref 0.05–0.5)
EOSINOPHILS RELATIVE PERCENT: 1 % (ref 0–6)
ERYTHROCYTE [DISTWIDTH] IN BLOOD BY AUTOMATED COUNT: 17.2 % (ref 11.5–15)
FLUAV RNA RESP QL NAA+PROBE: NOT DETECTED
FLUBV RNA RESP QL NAA+PROBE: NOT DETECTED
GFR, ESTIMATED: 84 ML/MIN/1.73M2
GLUCOSE SERPL-MCNC: 178 MG/DL (ref 74–99)
GLUCOSE UR STRIP-MCNC: NEGATIVE MG/DL
HCT VFR BLD AUTO: 35.7 % (ref 37–54)
HGB BLD-MCNC: 11.7 G/DL (ref 12.5–16.5)
HGB UR QL STRIP.AUTO: NEGATIVE
KETONES UR STRIP-MCNC: 15 MG/DL
LACTATE BLDV-SCNC: 1.7 MMOL/L (ref 0.5–1.9)
LEUKOCYTE ESTERASE UR QL STRIP: ABNORMAL
LIPASE SERPL-CCNC: 7 U/L (ref 13–60)
LYMPHOCYTES NFR BLD: 0.4 K/UL (ref 1.5–4)
LYMPHOCYTES RELATIVE PERCENT: 1 % (ref 20–42)
MCH RBC QN AUTO: 27 PG (ref 26–35)
MCHC RBC AUTO-ENTMCNC: 32.8 G/DL (ref 32–34.5)
MCV RBC AUTO: 82.3 FL (ref 80–99.9)
MONOCYTES NFR BLD: 2.8 K/UL (ref 0.1–0.95)
MONOCYTES NFR BLD: 6 % (ref 2–12)
NEUTROPHILS NFR BLD: 92 % (ref 43–80)
NEUTS SEG NFR BLD: 42 K/UL (ref 1.8–7.3)
NITRITE UR QL STRIP: POSITIVE
PH UR STRIP: 5.5 [PH] (ref 5–8)
PLATELET # BLD AUTO: 440 K/UL (ref 130–450)
PMV BLD AUTO: 9.4 FL (ref 7–12)
POTASSIUM SERPL-SCNC: 3.5 MMOL/L (ref 3.5–5.1)
PROT SERPL-MCNC: 6.3 G/DL (ref 6.4–8.3)
PROT UR STRIP-MCNC: 30 MG/DL
RBC # BLD AUTO: 4.34 M/UL (ref 3.8–5.8)
RBC # BLD: ABNORMAL 10*6/UL
RBC #/AREA URNS HPF: ABNORMAL /HPF
SARS-COV-2 RNA RESP QL NAA+PROBE: NOT DETECTED
SODIUM SERPL-SCNC: 132 MMOL/L (ref 136–145)
SOURCE: NORMAL
SP GR UR STRIP: 1.02 (ref 1–1.03)
SPECIMEN DESCRIPTION: NORMAL
UROBILINOGEN UR STRIP-ACNC: 1 EU/DL (ref 0–1)
WBC #/AREA URNS HPF: ABNORMAL /HPF
WBC OTHER # BLD: 45.6 K/UL (ref 4.5–11.5)

## 2025-07-07 PROCEDURE — 83605 ASSAY OF LACTIC ACID: CPT

## 2025-07-07 PROCEDURE — 93005 ELECTROCARDIOGRAM TRACING: CPT

## 2025-07-07 PROCEDURE — 2060000000 HC ICU INTERMEDIATE R&B

## 2025-07-07 PROCEDURE — 83690 ASSAY OF LIPASE: CPT

## 2025-07-07 PROCEDURE — 6360000004 HC RX CONTRAST MEDICATION: Performed by: RADIOLOGY

## 2025-07-07 PROCEDURE — 2580000003 HC RX 258

## 2025-07-07 PROCEDURE — 87086 URINE CULTURE/COLONY COUNT: CPT

## 2025-07-07 PROCEDURE — 71045 X-RAY EXAM CHEST 1 VIEW: CPT

## 2025-07-07 PROCEDURE — 87077 CULTURE AEROBIC IDENTIFY: CPT

## 2025-07-07 PROCEDURE — 85025 COMPLETE CBC W/AUTO DIFF WBC: CPT

## 2025-07-07 PROCEDURE — 87040 BLOOD CULTURE FOR BACTERIA: CPT

## 2025-07-07 PROCEDURE — 87636 SARSCOV2 & INF A&B AMP PRB: CPT

## 2025-07-07 PROCEDURE — 81001 URINALYSIS AUTO W/SCOPE: CPT

## 2025-07-07 PROCEDURE — 99285 EMERGENCY DEPT VISIT HI MDM: CPT

## 2025-07-07 PROCEDURE — 6370000000 HC RX 637 (ALT 250 FOR IP)

## 2025-07-07 PROCEDURE — 74177 CT ABD & PELVIS W/CONTRAST: CPT

## 2025-07-07 PROCEDURE — 80053 COMPREHEN METABOLIC PANEL: CPT

## 2025-07-07 RX ORDER — 0.9 % SODIUM CHLORIDE 0.9 %
1000 INTRAVENOUS SOLUTION INTRAVENOUS ONCE
Status: COMPLETED | OUTPATIENT
Start: 2025-07-07 | End: 2025-07-07

## 2025-07-07 RX ORDER — IOPAMIDOL 755 MG/ML
75 INJECTION, SOLUTION INTRAVASCULAR
Status: COMPLETED | OUTPATIENT
Start: 2025-07-07 | End: 2025-07-07

## 2025-07-07 RX ADMIN — Medication 250 MG: at 19:57

## 2025-07-07 RX ADMIN — IOPAMIDOL 75 ML: 755 INJECTION, SOLUTION INTRAVENOUS at 15:51

## 2025-07-07 RX ADMIN — SODIUM CHLORIDE 1000 ML: 0.9 INJECTION, SOLUTION INTRAVENOUS at 17:19

## 2025-07-07 ASSESSMENT — PAIN - FUNCTIONAL ASSESSMENT: PAIN_FUNCTIONAL_ASSESSMENT: NONE - DENIES PAIN

## 2025-07-07 ASSESSMENT — LIFESTYLE VARIABLES: HOW MANY STANDARD DRINKS CONTAINING ALCOHOL DO YOU HAVE ON A TYPICAL DAY: PATIENT DOES NOT DRINK

## 2025-07-07 NOTE — TELEPHONE ENCOUNTER
Name of Medication(s) Requested:  Requested Prescriptions     Pending Prescriptions Disp Refills    carvedilol (COREG) 3.125 MG tablet [Pharmacy Med Name: CARVEDILOL 3.125 MG TABLET] 180 tablet 1     Sig: TAKE 1 TABLET BY MOUTH WITH BREAKFAST AND EVENING MEAL       Medication is on current medication list Yes    Dosage and directions were verified? Yes    Quantity verified: 90 day supply     Pharmacy Verified?  Yes    Last Appointment:  9/30/2024    Future appts:  Future Appointments   Date Time Provider Department Center   7/8/2025 10:15 AM Keri Irwin, APRN - CNP [facility]   12/29/2025 10:15 AM Maida Gutierrez MD [facility]        (If no appt send self scheduling link. .REFILLAPPT)  Scheduling request sent?     [] Yes  [x] No    Does patient need updated?  [] Yes  [x] No

## 2025-07-07 NOTE — CARE COORDINATION
7/7/25  Note: [facility] (medicaid pending). He is bed hold & out of Medicare days & does NOT need Precert to return.   Electronically signed by ANGELO Parisi on 7/7/2025 at 4:07 PM

## 2025-07-07 NOTE — ED PROVIDER NOTES
DISCONTINUED MEDICATIONS:  Discontinued Medications    No medications on file              (Please note that portions of this note were completed with a voice recognition program.  Efforts were made to edit the dictations but occasionally words are mis-transcribed.)    Pernell Lockwood,  PGY-2        \

## 2025-07-07 NOTE — H&P
[facility]   History and Physical      CHIEF COMPLAINT:  diarrhea    History of Present Illness: pt is a 64 y.o. male with chief complaint of diarrhea. Pt states this has been going on for about 2 weeks. Pt states it is liquid. Pt states eating best he can. Pt c/o some nausea Pt denies any dizziness or lightheadedness. Pt denies fevers, chills, vomiting, abd pain, constipation, melena, hematochezia, change in urination, dysuria, or hematuria. Pt did not complain to me of abdominal pain, however,  he reported lower abdominal pain. Also, pt presented to hospital after lab work was drawn at the nursing home that came back with elevated white blood cell count    REVIEW OF SYSTEMS:    A detailed system review was conducted with patient and is negative unless stated in hpi.        PMH:  Past Medical History:   Diagnosis Date    Abscess of prostate     Anemia     Arthritis     Atherosclerotic heart disease of native coronary artery without angina pectoris     Bilateral carpal tunnel syndrome 2016    Cerebral artery occlusion with cerebral infarction ([facility])     Chronic back pain     COPD (chronic obstructive pulmonary disease) ([facility])     Coronary artery disease involving native coronary artery of native heart without angina pectoris 09/16/2022    CVA (cerebral vascular accident) ([facility]) 11/2015, 2017    Diabetes mellitus ([facility])     Type 2    Diabetic foot ulcer with osteomyelitis ([facility]) 12/21/2021    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

## 2025-07-08 PROBLEM — A04.72 C. DIFFICILE COLITIS: Status: ACTIVE | Noted: 2025-07-08

## 2025-07-08 LAB
ALBUMIN SERPL-MCNC: 2.5 G/DL (ref 3.5–5.2)
ALP SERPL-CCNC: 201 U/L (ref 40–129)
ALT SERPL-CCNC: <5 U/L (ref 0–50)
ANION GAP SERPL CALCULATED.3IONS-SCNC: 17 MMOL/L (ref 7–16)
AST SERPL-CCNC: 8 U/L (ref 0–50)
BASOPHILS # BLD: 0 K/UL (ref 0–0.2)
BASOPHILS NFR BLD: 0 % (ref 0–2)
BILIRUB DIRECT SERPL-MCNC: 0.2 MG/DL (ref 0–0.2)
BILIRUB INDIRECT SERPL-MCNC: 0.1 MG/DL (ref 0–1)
BILIRUB SERPL-MCNC: 0.3 MG/DL (ref 0–1.2)
BUN SERPL-MCNC: 28 MG/DL (ref 8–23)
C DIFF GDH + TOXINS A+B STL QL IA.RAPID: POSITIVE
CALCIUM SERPL-MCNC: 8.3 MG/DL (ref 8.8–10.2)
CHLORIDE SERPL-SCNC: 97 MMOL/L (ref 98–107)
CO2 SERPL-SCNC: 17 MMOL/L (ref 22–29)
CREAT SERPL-MCNC: 0.9 MG/DL (ref 0.7–1.2)
EKG ATRIAL RATE: 121 BPM
EKG P AXIS: -12 DEGREES
EKG P-R INTERVAL: 152 MS
EKG Q-T INTERVAL: 322 MS
EKG QRS DURATION: 82 MS
EKG QTC CALCULATION (BAZETT): 457 MS
EKG R AXIS: 50 DEGREES
EKG T AXIS: 182 DEGREES
EKG VENTRICULAR RATE: 121 BPM
EOSINOPHIL # BLD: 0 K/UL (ref 0.05–0.5)
EOSINOPHILS RELATIVE PERCENT: 0 % (ref 0–6)
ERYTHROCYTE [DISTWIDTH] IN BLOOD BY AUTOMATED COUNT: 17.2 % (ref 11.5–15)
G LAMBLIA AG STL QL IA: NEGATIVE
GFR, ESTIMATED: >90 ML/MIN/1.73M2
GLUCOSE BLD-MCNC: 123 MG/DL (ref 74–99)
GLUCOSE BLD-MCNC: 156 MG/DL (ref 74–99)
GLUCOSE BLD-MCNC: 175 MG/DL (ref 74–99)
GLUCOSE BLD-MCNC: 189 MG/DL (ref 74–99)
GLUCOSE SERPL-MCNC: 157 MG/DL (ref 74–99)
HBA1C MFR BLD: 5.8 % (ref 4–5.6)
HCT VFR BLD AUTO: 31.4 % (ref 37–54)
HGB BLD-MCNC: 10.2 G/DL (ref 12.5–16.5)
LACTATE BLDV-SCNC: 1 MMOL/L (ref 0.5–2.2)
LYMPHOCYTES NFR BLD: 0.8 K/UL (ref 1.5–4)
LYMPHOCYTES RELATIVE PERCENT: 2 % (ref 20–42)
MAGNESIUM SERPL-MCNC: 1.4 MG/DL (ref 1.6–2.4)
MAGNESIUM SERPL-MCNC: 2 MG/DL (ref 1.6–2.4)
MCH RBC QN AUTO: 26.9 PG (ref 26–35)
MCHC RBC AUTO-ENTMCNC: 32.5 G/DL (ref 32–34.5)
MCV RBC AUTO: 82.8 FL (ref 80–99.9)
MONOCYTES NFR BLD: 1.6 K/UL (ref 0.1–0.95)
MONOCYTES NFR BLD: 3 % (ref 2–12)
NEUTROPHILS NFR BLD: 95 % (ref 43–80)
NEUTS SEG NFR BLD: 44.89 K/UL (ref 1.8–7.3)
PHOSPHATE SERPL-MCNC: 3.2 MG/DL (ref 2.5–4.5)
PLATELET # BLD AUTO: 366 K/UL (ref 130–450)
PMV BLD AUTO: 9 FL (ref 7–12)
POTASSIUM SERPL-SCNC: 3.2 MMOL/L (ref 3.5–5.1)
POTASSIUM SERPL-SCNC: 3.6 MMOL/L (ref 3.5–5.1)
PROCALCITONIN SERPL-MCNC: 0.11 NG/ML (ref 0–0.08)
PROT SERPL-MCNC: 5.3 G/DL (ref 6.4–8.3)
RBC # BLD AUTO: 3.79 M/UL (ref 3.8–5.8)
RBC # BLD: ABNORMAL 10*6/UL
SODIUM SERPL-SCNC: 130 MMOL/L (ref 136–145)
SOURCE: NORMAL
SPECIMEN DESCRIPTION: ABNORMAL
SPECIMEN DESCRIPTION: NORMAL
WBC OTHER # BLD: 47.3 K/UL (ref 4.5–11.5)

## 2025-07-08 PROCEDURE — 6370000000 HC RX 637 (ALT 250 FOR IP): Performed by: INTERNAL MEDICINE

## 2025-07-08 PROCEDURE — 2500000003 HC RX 250 WO HCPCS: Performed by: SPECIALIST

## 2025-07-08 PROCEDURE — 84132 ASSAY OF SERUM POTASSIUM: CPT

## 2025-07-08 PROCEDURE — 84145 PROCALCITONIN (PCT): CPT

## 2025-07-08 PROCEDURE — 87045 FECES CULTURE AEROBIC BACT: CPT

## 2025-07-08 PROCEDURE — 6360000002 HC RX W HCPCS: Performed by: SPECIALIST

## 2025-07-08 PROCEDURE — 2060000000 HC ICU INTERMEDIATE R&B

## 2025-07-08 PROCEDURE — 87427 SHIGA-LIKE TOXIN AG IA: CPT

## 2025-07-08 PROCEDURE — 87046 STOOL CULTR AEROBIC BACT EA: CPT

## 2025-07-08 PROCEDURE — 87329 GIARDIA AG IA: CPT

## 2025-07-08 PROCEDURE — 6360000002 HC RX W HCPCS: Performed by: INTERNAL MEDICINE

## 2025-07-08 PROCEDURE — 83605 ASSAY OF LACTIC ACID: CPT

## 2025-07-08 PROCEDURE — 85025 COMPLETE CBC W/AUTO DIFF WBC: CPT

## 2025-07-08 PROCEDURE — 83735 ASSAY OF MAGNESIUM: CPT

## 2025-07-08 PROCEDURE — 2500000003 HC RX 250 WO HCPCS: Performed by: INTERNAL MEDICINE

## 2025-07-08 PROCEDURE — 84100 ASSAY OF PHOSPHORUS: CPT

## 2025-07-08 PROCEDURE — 83036 HEMOGLOBIN GLYCOSYLATED A1C: CPT

## 2025-07-08 PROCEDURE — 87324 CLOSTRIDIUM AG IA: CPT

## 2025-07-08 PROCEDURE — 87449 NOS EACH ORGANISM AG IA: CPT

## 2025-07-08 PROCEDURE — 2580000003 HC RX 258: Performed by: INTERNAL MEDICINE

## 2025-07-08 PROCEDURE — 82248 BILIRUBIN DIRECT: CPT

## 2025-07-08 PROCEDURE — 6370000000 HC RX 637 (ALT 250 FOR IP): Performed by: SPECIALIST

## 2025-07-08 PROCEDURE — 93010 ELECTROCARDIOGRAM REPORT: CPT | Performed by: INTERNAL MEDICINE

## 2025-07-08 PROCEDURE — 82962 GLUCOSE BLOOD TEST: CPT

## 2025-07-08 PROCEDURE — 80053 COMPREHEN METABOLIC PANEL: CPT

## 2025-07-08 RX ORDER — SODIUM CHLORIDE 0.9 % (FLUSH) 0.9 %
5-40 SYRINGE (ML) INJECTION PRN
Status: DISCONTINUED | OUTPATIENT
Start: 2025-07-08 | End: 2025-07-10 | Stop reason: HOSPADM

## 2025-07-08 RX ORDER — METOPROLOL SUCCINATE 25 MG/1
12.5 TABLET, EXTENDED RELEASE ORAL DAILY
Status: DISCONTINUED | OUTPATIENT
Start: 2025-07-08 | End: 2025-07-10 | Stop reason: HOSPADM

## 2025-07-08 RX ORDER — SODIUM CHLORIDE 9 MG/ML
INJECTION, SOLUTION INTRAVENOUS PRN
Status: DISCONTINUED | OUTPATIENT
Start: 2025-07-08 | End: 2025-07-10 | Stop reason: HOSPADM

## 2025-07-08 RX ORDER — SODIUM CHLORIDE 0.9 % (FLUSH) 0.9 %
5-40 SYRINGE (ML) INJECTION EVERY 12 HOURS SCHEDULED
Status: DISCONTINUED | OUTPATIENT
Start: 2025-07-08 | End: 2025-07-10 | Stop reason: HOSPADM

## 2025-07-08 RX ORDER — ONDANSETRON 4 MG/1
4 TABLET, ORALLY DISINTEGRATING ORAL EVERY 8 HOURS PRN
Status: DISCONTINUED | OUTPATIENT
Start: 2025-07-08 | End: 2025-07-10 | Stop reason: HOSPADM

## 2025-07-08 RX ORDER — ACETAMINOPHEN 650 MG/1
650 SUPPOSITORY RECTAL EVERY 6 HOURS PRN
Status: DISCONTINUED | OUTPATIENT
Start: 2025-07-08 | End: 2025-07-10 | Stop reason: HOSPADM

## 2025-07-08 RX ORDER — FERROUS SULFATE 325(65) MG
325 TABLET ORAL
Status: DISCONTINUED | OUTPATIENT
Start: 2025-07-08 | End: 2025-07-10 | Stop reason: HOSPADM

## 2025-07-08 RX ORDER — TAMSULOSIN HYDROCHLORIDE 0.4 MG/1
0.4 CAPSULE ORAL NIGHTLY
Status: DISCONTINUED | OUTPATIENT
Start: 2025-07-08 | End: 2025-07-10 | Stop reason: HOSPADM

## 2025-07-08 RX ORDER — POLYETHYLENE GLYCOL 3350 17 G/17G
17 POWDER, FOR SOLUTION ORAL DAILY PRN
Status: DISCONTINUED | OUTPATIENT
Start: 2025-07-08 | End: 2025-07-10 | Stop reason: HOSPADM

## 2025-07-08 RX ORDER — BENZONATATE 100 MG/1
100 CAPSULE ORAL 3 TIMES DAILY PRN
COMMUNITY
Start: 2025-05-23

## 2025-07-08 RX ORDER — CARBOXYMETHYLCELLULOSE SODIUM 10 MG/ML
2 SOLUTION/ DROPS OPHTHALMIC EVERY 6 HOURS PRN
Status: ON HOLD | COMMUNITY
Start: 2025-06-28 | End: 2025-07-10 | Stop reason: HOSPADM

## 2025-07-08 RX ORDER — INSULIN GLARGINE 100 [IU]/ML
5 INJECTION, SOLUTION SUBCUTANEOUS NIGHTLY
Status: DISCONTINUED | OUTPATIENT
Start: 2025-07-08 | End: 2025-07-10 | Stop reason: HOSPADM

## 2025-07-08 RX ORDER — CARVEDILOL 3.12 MG/1
TABLET ORAL
Qty: 180 TABLET | Refills: 1 | OUTPATIENT
Start: 2025-07-08

## 2025-07-08 RX ORDER — PANTOPRAZOLE SODIUM 40 MG/1
40 TABLET, DELAYED RELEASE ORAL DAILY
Status: DISCONTINUED | OUTPATIENT
Start: 2025-07-08 | End: 2025-07-10 | Stop reason: HOSPADM

## 2025-07-08 RX ORDER — MAGNESIUM SULFATE IN WATER 40 MG/ML
2000 INJECTION, SOLUTION INTRAVENOUS ONCE
Status: COMPLETED | OUTPATIENT
Start: 2025-07-08 | End: 2025-07-08

## 2025-07-08 RX ORDER — LACTOBACILLUS RHAMNOSUS GG 10B CELL
1 CAPSULE ORAL EVERY 12 HOURS
Status: DISCONTINUED | OUTPATIENT
Start: 2025-07-08 | End: 2025-07-10 | Stop reason: HOSPADM

## 2025-07-08 RX ORDER — ACETAMINOPHEN 325 MG/1
650 TABLET ORAL EVERY 6 HOURS PRN
Status: DISCONTINUED | OUTPATIENT
Start: 2025-07-08 | End: 2025-07-10 | Stop reason: HOSPADM

## 2025-07-08 RX ORDER — ONDANSETRON 2 MG/ML
4 INJECTION INTRAMUSCULAR; INTRAVENOUS EVERY 6 HOURS PRN
Status: DISCONTINUED | OUTPATIENT
Start: 2025-07-08 | End: 2025-07-10 | Stop reason: HOSPADM

## 2025-07-08 RX ORDER — GLUCAGON 1 MG/ML
1 KIT INJECTION PRN
Status: DISCONTINUED | OUTPATIENT
Start: 2025-07-08 | End: 2025-07-10 | Stop reason: HOSPADM

## 2025-07-08 RX ORDER — ATORVASTATIN CALCIUM 40 MG/1
40 TABLET, FILM COATED ORAL NIGHTLY
Status: DISCONTINUED | OUTPATIENT
Start: 2025-07-08 | End: 2025-07-10 | Stop reason: HOSPADM

## 2025-07-08 RX ORDER — ASPIRIN 81 MG/1
81 TABLET ORAL DAILY
Status: DISCONTINUED | OUTPATIENT
Start: 2025-07-08 | End: 2025-07-10 | Stop reason: HOSPADM

## 2025-07-08 RX ORDER — HYDROXYZINE HYDROCHLORIDE 25 MG/1
50 TABLET, FILM COATED ORAL 3 TIMES DAILY PRN
Status: DISCONTINUED | OUTPATIENT
Start: 2025-07-08 | End: 2025-07-10 | Stop reason: HOSPADM

## 2025-07-08 RX ORDER — ECHINACEA PURPUREA EXTRACT 125 MG
1 TABLET ORAL PRN
COMMUNITY
Start: 2025-05-23

## 2025-07-08 RX ORDER — DEXTROSE MONOHYDRATE 100 MG/ML
INJECTION, SOLUTION INTRAVENOUS CONTINUOUS PRN
Status: DISCONTINUED | OUTPATIENT
Start: 2025-07-08 | End: 2025-07-10 | Stop reason: HOSPADM

## 2025-07-08 RX ORDER — ENOXAPARIN SODIUM 100 MG/ML
40 INJECTION SUBCUTANEOUS DAILY
Status: DISCONTINUED | OUTPATIENT
Start: 2025-07-08 | End: 2025-07-10 | Stop reason: HOSPADM

## 2025-07-08 RX ORDER — TRAMADOL HYDROCHLORIDE 50 MG/1
50 TABLET ORAL EVERY 8 HOURS PRN
COMMUNITY
Start: 2025-07-03 | End: 2025-07-17

## 2025-07-08 RX ORDER — FAMOTIDINE 20 MG/1
40 TABLET, FILM COATED ORAL DAILY
Status: DISCONTINUED | OUTPATIENT
Start: 2025-07-08 | End: 2025-07-10 | Stop reason: HOSPADM

## 2025-07-08 RX ORDER — INSULIN LISPRO 100 [IU]/ML
0-4 INJECTION, SOLUTION INTRAVENOUS; SUBCUTANEOUS
Status: DISCONTINUED | OUTPATIENT
Start: 2025-07-08 | End: 2025-07-10 | Stop reason: HOSPADM

## 2025-07-08 RX ORDER — MULTIVIT-MIN/IRON/FOLIC ACID/K 18-600-40
2000 CAPSULE ORAL DAILY
COMMUNITY
Start: 2025-05-31

## 2025-07-08 RX ORDER — SODIUM CHLORIDE 9 MG/ML
INJECTION, SOLUTION INTRAVENOUS CONTINUOUS
Status: DISCONTINUED | OUTPATIENT
Start: 2025-07-08 | End: 2025-07-10 | Stop reason: HOSPADM

## 2025-07-08 RX ORDER — ESCITALOPRAM OXALATE 10 MG/1
5 TABLET ORAL DAILY
Status: DISCONTINUED | OUTPATIENT
Start: 2025-07-08 | End: 2025-07-10 | Stop reason: HOSPADM

## 2025-07-08 RX ORDER — OXYCODONE AND ACETAMINOPHEN 5; 325 MG/1; MG/1
1 TABLET ORAL EVERY 4 HOURS PRN
Status: DISCONTINUED | OUTPATIENT
Start: 2025-07-08 | End: 2025-07-10 | Stop reason: HOSPADM

## 2025-07-08 RX ORDER — FOLIC ACID 1 MG/1
1000 TABLET ORAL DAILY
Status: DISCONTINUED | OUTPATIENT
Start: 2025-07-08 | End: 2025-07-10 | Stop reason: HOSPADM

## 2025-07-08 RX ORDER — POTASSIUM CHLORIDE 1500 MG/1
40 TABLET, EXTENDED RELEASE ORAL ONCE
Status: COMPLETED | OUTPATIENT
Start: 2025-07-08 | End: 2025-07-08

## 2025-07-08 RX ADMIN — ENOXAPARIN SODIUM 40 MG: 100 INJECTION SUBCUTANEOUS at 08:39

## 2025-07-08 RX ADMIN — SODIUM CHLORIDE, PRESERVATIVE FREE 10 ML: 5 INJECTION INTRAVENOUS at 21:07

## 2025-07-08 RX ADMIN — ATORVASTATIN CALCIUM 40 MG: 40 TABLET, FILM COATED ORAL at 01:04

## 2025-07-08 RX ADMIN — INSULIN LISPRO 1 UNITS: 100 INJECTION, SOLUTION INTRAVENOUS; SUBCUTANEOUS at 16:37

## 2025-07-08 RX ADMIN — ASPIRIN 81 MG: 81 TABLET, COATED ORAL at 08:39

## 2025-07-08 RX ADMIN — SODIUM CHLORIDE: 0.9 INJECTION, SOLUTION INTRAVENOUS at 00:50

## 2025-07-08 RX ADMIN — TAMSULOSIN HYDROCHLORIDE 0.4 MG: 0.4 CAPSULE ORAL at 01:04

## 2025-07-08 RX ADMIN — INSULIN GLARGINE 5 UNITS: 100 INJECTION, SOLUTION SUBCUTANEOUS at 01:08

## 2025-07-08 RX ADMIN — FERROUS SULFATE TAB 325 MG (65 MG ELEMENTAL FE) 325 MG: 325 (65 FE) TAB at 08:41

## 2025-07-08 RX ADMIN — ESCITALOPRAM OXALATE 5 MG: 10 TABLET ORAL at 08:39

## 2025-07-08 RX ADMIN — FOLIC ACID 1000 MCG: 1 TABLET ORAL at 08:39

## 2025-07-08 RX ADMIN — SODIUM CHLORIDE, PRESERVATIVE FREE 10 ML: 5 INJECTION INTRAVENOUS at 08:40

## 2025-07-08 RX ADMIN — SODIUM CHLORIDE: 0.9 INJECTION, SOLUTION INTRAVENOUS at 16:16

## 2025-07-08 RX ADMIN — PANTOPRAZOLE SODIUM 40 MG: 40 TABLET, DELAYED RELEASE ORAL at 08:39

## 2025-07-08 RX ADMIN — TAMSULOSIN HYDROCHLORIDE 0.4 MG: 0.4 CAPSULE ORAL at 21:07

## 2025-07-08 RX ADMIN — HYDROXYZINE HYDROCHLORIDE 50 MG: 25 TABLET ORAL at 08:39

## 2025-07-08 RX ADMIN — ONDANSETRON 4 MG: 4 TABLET, ORALLY DISINTEGRATING ORAL at 01:04

## 2025-07-08 RX ADMIN — OXYCODONE AND ACETAMINOPHEN 1 TABLET: 5; 325 TABLET ORAL at 21:07

## 2025-07-08 RX ADMIN — WATER 2000 MG: 1 INJECTION INTRAMUSCULAR; INTRAVENOUS; SUBCUTANEOUS at 18:18

## 2025-07-08 RX ADMIN — FAMOTIDINE 40 MG: 20 TABLET, FILM COATED ORAL at 08:39

## 2025-07-08 RX ADMIN — HYDROXYZINE HYDROCHLORIDE 50 MG: 25 TABLET ORAL at 21:06

## 2025-07-08 RX ADMIN — Medication 1 CAPSULE: at 21:07

## 2025-07-08 RX ADMIN — ATORVASTATIN CALCIUM 40 MG: 40 TABLET, FILM COATED ORAL at 21:07

## 2025-07-08 RX ADMIN — OXYCODONE AND ACETAMINOPHEN 1 TABLET: 5; 325 TABLET ORAL at 10:13

## 2025-07-08 RX ADMIN — OXYCODONE AND ACETAMINOPHEN 1 TABLET: 5; 325 TABLET ORAL at 01:04

## 2025-07-08 RX ADMIN — FIDAXOMICIN 200 MG: 200 TABLET, FILM COATED ORAL at 21:09

## 2025-07-08 RX ADMIN — METOPROLOL SUCCINATE 12.5 MG: 25 TABLET, EXTENDED RELEASE ORAL at 08:39

## 2025-07-08 RX ADMIN — POTASSIUM CHLORIDE 40 MEQ: 1500 TABLET, EXTENDED RELEASE ORAL at 12:52

## 2025-07-08 RX ADMIN — MAGNESIUM SULFATE HEPTAHYDRATE 2000 MG: 40 INJECTION, SOLUTION INTRAVENOUS at 12:58

## 2025-07-08 ASSESSMENT — PAIN DESCRIPTION - ORIENTATION
ORIENTATION: RIGHT;LEFT
ORIENTATION: RIGHT;LEFT
ORIENTATION: RIGHT

## 2025-07-08 ASSESSMENT — PAIN SCALES - GENERAL
PAINLEVEL_OUTOF10: 6
PAINLEVEL_OUTOF10: 8
PAINLEVEL_OUTOF10: 8

## 2025-07-08 ASSESSMENT — PAIN DESCRIPTION - DESCRIPTORS: DESCRIPTORS: ACHING;DISCOMFORT

## 2025-07-08 ASSESSMENT — PAIN DESCRIPTION - LOCATION
LOCATION: ABDOMEN;GENERALIZED
LOCATION: BACK;GENERALIZED
LOCATION: LEG

## 2025-07-08 NOTE — PROGRESS NOTES
4 Eyes Skin Assessment     NAME:  Jerome Steel  YOB: 1960  MEDICAL RECORD NUMBER:  [id number]    The patient is being assessed for  Admission    I agree that at least one RN has performed a thorough Head to Toe Skin Assessment on the patient. ALL assessment sites listed below have been assessed.      Areas assessed by both nurses:    Head, Face, Ears, Shoulders, Back, Chest, Arms, Elbows, Hands, Sacrum. Buttock, Coccyx, Ischium, Legs. Feet and Heels, and Under Medical Devices         Does the Patient have a Wound? Yes wound(s) were present on assessment. LDA wound assessment was Initiated and completed by RN right BKA- scabs, redness on buttocks and coccyx, scattered abrasions.        Maik Prevention initiated by RN: Yes  Wound Care Orders initiated by RN: No    Pressure Injury (Stage 1,2,3,4, Unstageable, DTI, NWPT, and Complex wounds) if present, place Wound referral order by RN under : No    New Ostomies, if present place, Ostomy referral order under : No     Nurse 1 eSignature: Electronically signed by Sis Pastor RN on 7/8/25 at 2:41 AM EDT    **SHARE this note so that the co-signing nurse can place an eSignature**    Nurse 2 eSignature: Electronically signed by Ilda Singh RN on 7/8/25 at 6:15 AM EDT

## 2025-07-08 NOTE — CONSULTS
CONSULTATION NOTE :ID     Patient - Jerome Steel,  Age - 64 y.o.    - 1960      Room Number - 0637/0637-01   N -  [id number]   [facility] # - [id number]  Date of Admission -  2025  1:45 PM  Patient's PCP: Greg Chang MD     Requesting Physician: Gage Chong DO    HISTORY OF PRESENT ILLNESS   This is a 64 y.o. male who was admitted on 2025   to the hospital with a chief complaints of   Chief Complaint   Patient presents with    Abnormal Lab     Ems state elevated wbc     ID was consulted on 25 for antibiotic management   The history is obtained from extensive review of available past medical records   Knonw to ID  last seen by NP  Strep agalactiae bacteremia with penicillin ANNELIESE <0.06 RX until 25   Right heel wound  S/p  Excisional wound debridement of right heel wound to and through level of deep fascia and muscle, total measurement 6 cm x 4.5 cm x 0.5 cm in dimension. Incision and drainage, right calcaneus bone-cx gpc, yeast, MRSA, Klebsiella esbl, pseudomonas, strep agalactiae  Acute osteomyelitis right foot.  Status post right BKA   Recent CABG  Urinary retention  Enterobacter urine -asymptomatic  LYRIC -9 mm x 3 mm mobile echodensity was noted on the aortic valve, CTS no intervention    Pt presents with diarrhea wbc45.6   CT ABDOMEN PELVIS W IV CONTRAST Additional Contrast? None  Result Date: 2025  Diffuse bowel wall thickening involving the entire colon with adjacent mesenteric stranding and trace perihepatic ascites. This is concerning for an infectious or inflammatory colitis.     XR CHEST 1 VIEW  Result Date: 2025  No acute process. Right-sided PICC line catheter.      Pt in bed has no c/o f/c/n/v/d        PAST MEDICAL AND SURGICAL HISTORY     Past Medical History:   Diagnosis Date    Abscess of prostate     Anemia     Arthritis     Atherosclerotic heart disease of native coronary artery without angina pectoris

## 2025-07-08 NOTE — CARE COORDINATION
Patient admitted from [facility] in [place].  He reports plan is for him to return there on DC.  Per My at [facility] patient is a LTC bedhold there and can return.  Patient does not have any Medicare days left and is private paying, and Medicaid pending there.  NO PRECERT to return.  They are following.  Will need KELLY signed.

## 2025-07-08 NOTE — TELEPHONE ENCOUNTER
Called phone number and pt is at nursing facility and per nurse pt is currently in pt and I asked who is Primary care doctor is and it is Dr. Greg Chang. Informed her requesting refill for carvedilol which she said pt is no longer on.

## 2025-07-08 NOTE — PLAN OF CARE
Problem: Chronic Conditions and Co-morbidities  Goal: Patient's chronic conditions and co-morbidity symptoms are monitored and maintained or improved  Outcome: Progressing     Problem: Discharge Planning  Goal: Discharge to home or other facility with appropriate resources  Outcome: Progressing     Problem: Safety - Adult  Goal: Free from fall injury  Outcome: Progressing  Flowsheets (Taken 7/8/2025 0800)  Free From Fall Injury: Instruct family/caregiver on patient safety     Problem: Pain  Goal: Verbalizes/displays adequate comfort level or baseline comfort level  Outcome: Progressing  Flowsheets (Taken 7/8/2025 0800 by Nina Wayne RN)  Verbalizes/displays adequate comfort level or baseline comfort level:   Encourage patient to monitor pain and request assistance   Assess pain using appropriate pain scale   Administer analgesics based on type and severity of pain and evaluate response   Implement non-pharmacological measures as appropriate and evaluate response   Notify Licensed Independent Practitioner if interventions unsuccessful or patient reports new pain     Problem: Skin/Tissue Integrity  Goal: Skin integrity remains intact  Description: 1.  Monitor for areas of redness and/or skin breakdown  2.  Assess vascular access sites hourly  3.  Every 4-6 hours minimum:  Change oxygen saturation probe site  4.  Every 4-6 hours:  If on nasal continuous positive airway pressure, respiratory therapy assess nares and determine need for appliance change or resting period  Outcome: Progressing  Flowsheets (Taken 7/8/2025 0800)  Skin Integrity Remains Intact: Monitor for areas of redness and/or skin breakdown

## 2025-07-08 NOTE — PROGRESS NOTES
Spiritual Health History and Assessment/Progress Note  Y  Trevor Kunz    (P) Loneliness/Social Isolation,  ,  ,      Name: Jerome Steel MRN: [id number]    Age: 64 y.o.     Sex: male   Language: English   Samaritan: Confucianism   Colitis     Date: 7/8/2025                           Spiritual Assessment began in [facility] [address] [facility]        Referral/Consult From: (P) Multi-disciplinary team   Encounter Overview/Reason: (P) Loneliness/Social Isolation  Service Provided For: (P) Patient    Yee, Belief, Meaning:   Patient identifies as spiritual  Family/Friends No family/friends present      Importance and Influence:  Patient has spiritual/personal beliefs that influence decisions regarding their health  Family/Friends No family/friends present    Community:  Patient feels well-supported. Support system includes: Extended family  Family/Friends No family/friends present    Assessment and Plan of Care:     Patient Interventions include: Facilitated expression of thoughts and feelings, Explored spiritual coping/struggle/distress, and Affirmed coping skills/support systems  Family/Friends Interventions include: No family/friends present    Patient Plan of Care: Spiritual Care available upon further referral  Family/Friends Plan of Care: No family/friends present    Electronically signed by JACOB Ellis on 7/8/2025 at 10:53 AM

## 2025-07-08 NOTE — TELEPHONE ENCOUNTER
Name of Medication(s) Requested:  Requested Prescriptions     Refused Prescriptions Disp Refills    carvedilol (COREG) 3.125 MG tablet [Pharmacy Med Name: CARVEDILOL 3.125 MG TABLET] 180 tablet 1     Sig: TAKE 1 TABLET BY MOUTH WITH BREAKFAST AND EVENING MEAL     Refused By: NAT HUNTLEY     Reason for Refusal: Patient no longer under prescriber care       Medication is on current medication list Yes    Dosage and directions were verified? Yes    Quantity verified: 90 day supply     Pharmacy Verified?  Yes    Last Appointment:  9/30/2024    Future appts:  Future Appointments   Date Time Provider Department Center   7/9/2025  3:00 PM SJW ECHO 1 [facility]U [facility]   12/29/2025 10:15 AM Maida Gutierrez MD [facility]        (If no appt send self scheduling link. .REFILLAPPT)  Scheduling request sent?     [] Yes  [x] No    Does patient need updated?  [] Yes  [x] No

## 2025-07-08 NOTE — PROGRESS NOTES
[facility] Hospitalist   Progress Note    Admitting Date and Time: 7/7/2025  1:45 PM  Admit Dx: Colitis [K52.9]  Leukocytosis, unspecified type [D72.829]    Subjective:    Patient was admitted with Colitis [K52.9]  Leukocytosis, unspecified type [D72.829]. Patient denies fever, chills, cp, sob, n/v.     aspirin  81 mg Oral Daily    atorvastatin  40 mg Oral Nightly    escitalopram  5 mg Oral Daily    famotidine  40 mg Oral Daily    folic acid  1,000 mcg Oral Daily    ferrous sulfate  325 mg Oral Daily with breakfast    insulin glargine  5 Units SubCUTAneous Nightly    metoprolol succinate  12.5 mg Oral Daily    pantoprazole  40 mg Oral Daily    tamsulosin  0.4 mg Oral Nightly    sodium chloride flush  5-40 mL IntraVENous 2 times per day    enoxaparin  40 mg SubCUTAneous Daily    insulin lispro  0-4 Units SubCUTAneous 4x Daily AC & HS    Fidaxomicin  200 mg Oral BID     hydrOXYzine HCl, 50 mg, TID PRN  oxyCODONE-acetaminophen, 1 tablet, Q4H PRN  sodium chloride flush, 5-40 mL, PRN  sodium chloride, , PRN  ondansetron, 4 mg, Q8H PRN   Or  ondansetron, 4 mg, Q6H PRN  polyethylene glycol, 17 g, Daily PRN  acetaminophen, 650 mg, Q6H PRN   Or  acetaminophen, 650 mg, Q6H PRN  glucose, 4 tablet, PRN  dextrose bolus, 125 mL, PRN   Or  dextrose bolus, 250 mL, PRN  glucagon (rDNA), 1 mg, PRN  dextrose, , Continuous PRN         Objective:    /65   Pulse (!) 106   Temp 97.3 °F (36.3 °C)   Resp 14   Ht 1.803 m (5' 11\")   Wt 60.8 kg (134 lb)   SpO2 97%   BMI 18.69 kg/m²   Skin: warm and dry, no rash or erythema  Pulmonary/Chest: clear to auscultation bilaterally- no wheezes, rales or rhonchi, normal air movement, no respiratory distress  Cardiovascular: rhythm reg at rate of 102  Abdomen: soft, non-tender, non-distended, normal bowel sounds, no masses or organomegaly  Extremities: no cyanosis, no clubbing, and no edema      Recent Labs     07/07/25  1428 07/08/25  0520   * 130*   K 3.5 3.2*   CL

## 2025-07-09 ENCOUNTER — TELEPHONE (OUTPATIENT)
Dept: CARDIAC REHAB | Age: 65
End: 2025-07-09

## 2025-07-09 ENCOUNTER — HOSPITAL ENCOUNTER (INPATIENT)
Age: 65
Discharge: HOME | DRG: 871 | End: 2025-07-11
Payer: COMMERCIAL

## 2025-07-09 ENCOUNTER — ANESTHESIA EVENT (OUTPATIENT)
Dept: ENDOSCOPY | Age: 65
DRG: 871 | End: 2025-07-09
Payer: COMMERCIAL

## 2025-07-09 ENCOUNTER — ANESTHESIA (OUTPATIENT)
Dept: ENDOSCOPY | Age: 65
DRG: 871 | End: 2025-07-09
Payer: COMMERCIAL

## 2025-07-09 LAB
ANION GAP SERPL CALCULATED.3IONS-SCNC: 12 MMOL/L (ref 7–16)
BASOPHILS # BLD: 0.19 K/UL (ref 0–0.2)
BASOPHILS NFR BLD: 0 % (ref 0–2)
BUN SERPL-MCNC: 25 MG/DL (ref 8–23)
CALCIUM SERPL-MCNC: 8 MG/DL (ref 8.8–10.2)
CHLORIDE SERPL-SCNC: 99 MMOL/L (ref 98–107)
CO2 SERPL-SCNC: 18 MMOL/L (ref 22–29)
CREAT SERPL-MCNC: 0.7 MG/DL (ref 0.7–1.2)
CRP SERPL HS-MCNC: 123 MG/L (ref 0–5)
ECHO BSA: 1.74 M2
ECHO LV EF PHYSICIAN: 60 %
EOSINOPHIL # BLD: 0.95 K/UL (ref 0.05–0.5)
EOSINOPHILS RELATIVE PERCENT: 2 % (ref 0–6)
ERYTHROCYTE [DISTWIDTH] IN BLOOD BY AUTOMATED COUNT: 17.2 % (ref 11.5–15)
ERYTHROCYTE [SEDIMENTATION RATE] IN BLOOD BY WESTERGREN METHOD: 11 MM/HR (ref 0–15)
GFR, ESTIMATED: >90 ML/MIN/1.73M2
GLUCOSE BLD-MCNC: 108 MG/DL (ref 74–99)
GLUCOSE BLD-MCNC: 119 MG/DL (ref 74–99)
GLUCOSE BLD-MCNC: 132 MG/DL (ref 74–99)
GLUCOSE BLD-MCNC: 148 MG/DL (ref 74–99)
GLUCOSE SERPL-MCNC: 124 MG/DL (ref 74–99)
HCT VFR BLD AUTO: 28.4 % (ref 37–54)
HGB BLD-MCNC: 9.2 G/DL (ref 12.5–16.5)
IMM GRANULOCYTES # BLD AUTO: 1.55 K/UL (ref 0–0.58)
IMM GRANULOCYTES NFR BLD: 4 % (ref 0–5)
LYMPHOCYTES NFR BLD: 1.6 K/UL (ref 1.5–4)
LYMPHOCYTES RELATIVE PERCENT: 4 % (ref 20–42)
MAGNESIUM SERPL-MCNC: 1.6 MG/DL (ref 1.6–2.4)
MCH RBC QN AUTO: 26.8 PG (ref 26–35)
MCHC RBC AUTO-ENTMCNC: 32.4 G/DL (ref 32–34.5)
MCV RBC AUTO: 82.8 FL (ref 80–99.9)
MONOCYTES NFR BLD: 1.72 K/UL (ref 0.1–0.95)
MONOCYTES NFR BLD: 4 % (ref 2–12)
NEUTROPHILS NFR BLD: 87 % (ref 43–80)
NEUTS SEG NFR BLD: 38.32 K/UL (ref 1.8–7.3)
PHOSPHATE SERPL-MCNC: 1.7 MG/DL (ref 2.5–4.5)
PLATELET # BLD AUTO: 310 K/UL (ref 130–450)
PMV BLD AUTO: 9.5 FL (ref 7–12)
POTASSIUM SERPL-SCNC: 3.3 MMOL/L (ref 3.5–5.1)
RBC # BLD AUTO: 3.43 M/UL (ref 3.8–5.8)
RBC # BLD: ABNORMAL 10*6/UL
SODIUM SERPL-SCNC: 129 MMOL/L (ref 136–145)
WBC OTHER # BLD: 44.3 K/UL (ref 4.5–11.5)

## 2025-07-09 PROCEDURE — 83735 ASSAY OF MAGNESIUM: CPT

## 2025-07-09 PROCEDURE — 6360000002 HC RX W HCPCS: Performed by: SPECIALIST

## 2025-07-09 PROCEDURE — 85652 RBC SED RATE AUTOMATED: CPT

## 2025-07-09 PROCEDURE — 7100000010 HC PHASE II RECOVERY - FIRST 15 MIN: Performed by: INTERNAL MEDICINE

## 2025-07-09 PROCEDURE — 82962 GLUCOSE BLOOD TEST: CPT

## 2025-07-09 PROCEDURE — 2060000000 HC ICU INTERMEDIATE R&B

## 2025-07-09 PROCEDURE — 6370000000 HC RX 637 (ALT 250 FOR IP): Performed by: INTERNAL MEDICINE

## 2025-07-09 PROCEDURE — 3700000000 HC ANESTHESIA ATTENDED CARE: Performed by: INTERNAL MEDICINE

## 2025-07-09 PROCEDURE — 80048 BASIC METABOLIC PNL TOTAL CA: CPT

## 2025-07-09 PROCEDURE — 93325 DOPPLER ECHO COLOR FLOW MAPG: CPT | Performed by: INTERNAL MEDICINE

## 2025-07-09 PROCEDURE — 6360000002 HC RX W HCPCS: Performed by: INTERNAL MEDICINE

## 2025-07-09 PROCEDURE — 2500000003 HC RX 250 WO HCPCS: Performed by: INTERNAL MEDICINE

## 2025-07-09 PROCEDURE — 85025 COMPLETE CBC W/AUTO DIFF WBC: CPT

## 2025-07-09 PROCEDURE — 7100000011 HC PHASE II RECOVERY - ADDTL 15 MIN: Performed by: INTERNAL MEDICINE

## 2025-07-09 PROCEDURE — 6370000000 HC RX 637 (ALT 250 FOR IP): Performed by: SPECIALIST

## 2025-07-09 PROCEDURE — 2709999900 HC NON-CHARGEABLE SUPPLY: Performed by: INTERNAL MEDICINE

## 2025-07-09 PROCEDURE — 3700000001 HC ADD 15 MINUTES (ANESTHESIA): Performed by: INTERNAL MEDICINE

## 2025-07-09 PROCEDURE — 2500000003 HC RX 250 WO HCPCS: Performed by: SPECIALIST

## 2025-07-09 PROCEDURE — B24BZZ4 ULTRASONOGRAPHY OF HEART WITH AORTA, TRANSESOPHAGEAL: ICD-10-PCS | Performed by: INTERNAL MEDICINE

## 2025-07-09 PROCEDURE — 2580000003 HC RX 258: Performed by: INTERNAL MEDICINE

## 2025-07-09 PROCEDURE — 93312 ECHO TRANSESOPHAGEAL: CPT | Performed by: INTERNAL MEDICINE

## 2025-07-09 PROCEDURE — 93312 ECHO TRANSESOPHAGEAL: CPT

## 2025-07-09 PROCEDURE — 86140 C-REACTIVE PROTEIN: CPT

## 2025-07-09 PROCEDURE — 93320 DOPPLER ECHO COMPLETE: CPT | Performed by: INTERNAL MEDICINE

## 2025-07-09 PROCEDURE — 6360000002 HC RX W HCPCS

## 2025-07-09 PROCEDURE — 2580000003 HC RX 258

## 2025-07-09 PROCEDURE — 84100 ASSAY OF PHOSPHORUS: CPT

## 2025-07-09 RX ORDER — LIDOCAINE HYDROCHLORIDE 20 MG/ML
INJECTION, SOLUTION INFILTRATION; PERINEURAL
Status: DISCONTINUED | OUTPATIENT
Start: 2025-07-09 | End: 2025-07-09 | Stop reason: SDUPTHER

## 2025-07-09 RX ORDER — MAGNESIUM SULFATE IN WATER 40 MG/ML
2000 INJECTION, SOLUTION INTRAVENOUS ONCE
Status: COMPLETED | OUTPATIENT
Start: 2025-07-09 | End: 2025-07-09

## 2025-07-09 RX ORDER — PROPOFOL 10 MG/ML
INJECTION, EMULSION INTRAVENOUS
Status: DISCONTINUED | OUTPATIENT
Start: 2025-07-09 | End: 2025-07-09 | Stop reason: SDUPTHER

## 2025-07-09 RX ORDER — POTASSIUM CHLORIDE 1500 MG/1
40 TABLET, EXTENDED RELEASE ORAL ONCE
Status: COMPLETED | OUTPATIENT
Start: 2025-07-09 | End: 2025-07-09

## 2025-07-09 RX ORDER — METRONIDAZOLE 500 MG/100ML
500 INJECTION, SOLUTION INTRAVENOUS EVERY 8 HOURS
Status: DISCONTINUED | OUTPATIENT
Start: 2025-07-09 | End: 2025-07-10 | Stop reason: HOSPADM

## 2025-07-09 RX ORDER — SODIUM CHLORIDE 9 MG/ML
INJECTION, SOLUTION INTRAVENOUS
Status: DISCONTINUED | OUTPATIENT
Start: 2025-07-09 | End: 2025-07-09 | Stop reason: SDUPTHER

## 2025-07-09 RX ADMIN — TAMSULOSIN HYDROCHLORIDE 0.4 MG: 0.4 CAPSULE ORAL at 20:55

## 2025-07-09 RX ADMIN — SODIUM CHLORIDE: 9 INJECTION, SOLUTION INTRAVENOUS at 15:04

## 2025-07-09 RX ADMIN — PHENYLEPHRINE HYDROCHLORIDE 100 MCG: 10 INJECTION INTRAVENOUS at 15:18

## 2025-07-09 RX ADMIN — METRONIDAZOLE 500 MG: 500 INJECTION, SOLUTION INTRAVENOUS at 20:59

## 2025-07-09 RX ADMIN — LIDOCAINE HYDROCHLORIDE 80 MG: 20 INJECTION, SOLUTION INFILTRATION; PERINEURAL at 15:12

## 2025-07-09 RX ADMIN — FAMOTIDINE 40 MG: 20 TABLET, FILM COATED ORAL at 11:20

## 2025-07-09 RX ADMIN — METOPROLOL SUCCINATE 12.5 MG: 25 TABLET, EXTENDED RELEASE ORAL at 11:20

## 2025-07-09 RX ADMIN — SODIUM CHLORIDE, PRESERVATIVE FREE 10 ML: 5 INJECTION INTRAVENOUS at 20:58

## 2025-07-09 RX ADMIN — Medication 1 CAPSULE: at 20:55

## 2025-07-09 RX ADMIN — FIDAXOMICIN 200 MG: 200 TABLET, FILM COATED ORAL at 11:20

## 2025-07-09 RX ADMIN — FIDAXOMICIN 200 MG: 200 TABLET, FILM COATED ORAL at 20:55

## 2025-07-09 RX ADMIN — SODIUM CHLORIDE, PRESERVATIVE FREE 10 ML: 5 INJECTION INTRAVENOUS at 11:37

## 2025-07-09 RX ADMIN — FOLIC ACID 1000 MCG: 1 TABLET ORAL at 11:21

## 2025-07-09 RX ADMIN — WATER 2000 MG: 1 INJECTION INTRAMUSCULAR; INTRAVENOUS; SUBCUTANEOUS at 11:24

## 2025-07-09 RX ADMIN — FERROUS SULFATE TAB 325 MG (65 MG ELEMENTAL FE) 325 MG: 325 (65 FE) TAB at 11:20

## 2025-07-09 RX ADMIN — ASPIRIN 81 MG: 81 TABLET, COATED ORAL at 11:18

## 2025-07-09 RX ADMIN — HYDROXYZINE HYDROCHLORIDE 50 MG: 25 TABLET ORAL at 11:18

## 2025-07-09 RX ADMIN — PROPOFOL 80 MG: 10 INJECTION, EMULSION INTRAVENOUS at 15:12

## 2025-07-09 RX ADMIN — SODIUM PHOSPHATE, MONOBASIC, MONOHYDRATE AND SODIUM PHOSPHATE, DIBASIC, ANHYDROUS 15 MMOL: 142; 276 INJECTION, SOLUTION INTRAVENOUS at 14:00

## 2025-07-09 RX ADMIN — Medication 1 CAPSULE: at 11:19

## 2025-07-09 RX ADMIN — ESCITALOPRAM OXALATE 5 MG: 10 TABLET ORAL at 11:20

## 2025-07-09 RX ADMIN — ATORVASTATIN CALCIUM 40 MG: 40 TABLET, FILM COATED ORAL at 20:55

## 2025-07-09 RX ADMIN — PANTOPRAZOLE SODIUM 40 MG: 40 TABLET, DELAYED RELEASE ORAL at 11:20

## 2025-07-09 RX ADMIN — POTASSIUM CHLORIDE 40 MEQ: 1500 TABLET, EXTENDED RELEASE ORAL at 11:35

## 2025-07-09 RX ADMIN — MAGNESIUM SULFATE HEPTAHYDRATE 2000 MG: 40 INJECTION, SOLUTION INTRAVENOUS at 11:40

## 2025-07-09 RX ADMIN — PROPOFOL 20 MG: 10 INJECTION, EMULSION INTRAVENOUS at 15:14

## 2025-07-09 ASSESSMENT — PAIN - FUNCTIONAL ASSESSMENT
PAIN_FUNCTIONAL_ASSESSMENT: NONE - DENIES PAIN

## 2025-07-09 ASSESSMENT — LIFESTYLE VARIABLES: SMOKING_STATUS: 0

## 2025-07-09 NOTE — PROGRESS NOTES
Progress Note  Date:2025       Room:0637/0637-01  Patient Name:Jerome Steel     YOB: 1960     Age:64 y.o.        Subjective    Subjective  25   In bed has telesitter   Review of Systems confusion   Meds:  aspirin EC tablet 81 mg, Daily  atorvastatin (LIPITOR) tablet 40 mg, Nightly  escitalopram (LEXAPRO) tablet 5 mg, Daily  famotidine (PEPCID) tablet 40 mg, Daily  folic acid (FOLVITE) tablet 1,000 mcg, Daily  ferrous sulfate (IRON 325) tablet 325 mg, Daily with breakfast  hydrOXYzine HCl (ATARAX) tablet 50 mg, TID PRN  insulin glargine (LANTUS) injection vial 5 Units, Nightly  metoprolol succinate (TOPROL XL) extended release tablet 12.5 mg, Daily  pantoprazole (PROTONIX) tablet 40 mg, Daily  oxyCODONE-acetaminophen (PERCOCET) 5-325 MG per tablet 1 tablet, Q4H PRN  tamsulosin (FLOMAX) capsule 0.4 mg, Nightly  sodium chloride flush 0.9 % injection 5-40 mL, 2 times per day  sodium chloride flush 0.9 % injection 5-40 mL, PRN  0.9 % sodium chloride infusion, PRN  enoxaparin (LOVENOX) injection 40 mg, Daily  ondansetron (ZOFRAN-ODT) disintegrating tablet 4 mg, Q8H PRN   Or  ondansetron (ZOFRAN) injection 4 mg, Q6H PRN  polyethylene glycol (GLYCOLAX) packet 17 g, Daily PRN  acetaminophen (TYLENOL) tablet 650 mg, Q6H PRN   Or  acetaminophen (TYLENOL) suppository 650 mg, Q6H PRN  glucose chewable tablet 16 g, PRN  dextrose bolus 10% 125 mL, PRN   Or  dextrose bolus 10% 250 mL, PRN  glucagon injection 1 mg, PRN  dextrose 10 % infusion, Continuous PRN  0.9 % sodium chloride infusion, Continuous  insulin lispro (HUMALOG,ADMELOG) injection vial 0-4 Units, 4x Daily AC & HS  Fidaxomicin (DIFICID) tablet 200 mg, BID  lactobacillus (CULTURELLE) capsule 1 capsule, Q12H  cefTRIAXone (ROCEPHIN) 2,000 mg in sterile water 20 mL IV syringe, Q24H       Objective         Vitals Last 24 Hours:  TEMPERATURE:  Temp  Av.7 °F (36.5 °C)  Min: 97.1 °F (36.2 °C)  Max: 98.9 °F (37.2 °C)  RESPIRATIONS RANGE: Resp

## 2025-07-09 NOTE — ANESTHESIA PRE PROCEDURE
07/09/2025 06:10 AM     07/09/2025 06:10 AM       CMP:   Lab Results   Component Value Date/Time     07/09/2025 06:10 AM    K 3.3 07/09/2025 06:10 AM    K 3.6 09/14/2022 06:34 AM    CL 99 07/09/2025 06:10 AM    CO2 18 07/09/2025 06:10 AM    BUN 25 07/09/2025 06:10 AM    CREATININE 0.7 07/09/2025 06:10 AM    GFRAA >60 09/26/2022 06:24 AM    LABGLOM >90 07/09/2025 06:10 AM    GLUCOSE 124 07/09/2025 06:10 AM    CALCIUM 8.0 07/09/2025 06:10 AM    BILITOT 0.3 07/08/2025 05:20 AM    ALKPHOS 201 07/08/2025 05:20 AM    AST 8 07/08/2025 05:20 AM    ALT <5 07/08/2025 05:20 AM       POC Tests:   Recent Labs     07/09/25  1146   POCGLU 148*       Coags:   Lab Results   Component Value Date/Time    PROTIME 14.2 04/18/2025 04:09 AM    INR 1.3 04/18/2025 04:09 AM    APTT 29.3 04/17/2025 10:45 AM    APTT 34.1 11/15/2015 10:46 AM       HCG (If Applicable): No results found for: \"PREGTESTUR\", \"PREGSERUM\", \"HCG\", \"HCGQUANT\"     ABGs: No results found for: \"PHART\", \"PO2ART\", \"JKR0TID\", \"BVO4XFJ\", \"BEART\", \"G5DGTGIH\"     Type & Screen (If Applicable):  Lab Results   Component Value Date    ABORH O POSITIVE 05/19/2025    LABANTI NEGATIVE 05/19/2025       Drug/Infectious Status (If Applicable):  No results found for: \"HIV\", \"HEPCAB\"    COVID-19 Screening (If Applicable):   Lab Results   Component Value Date/Time    COVID19 Not Detected 07/07/2025 04:31 PM    COVID19 Not Detected 05/09/2025 09:51 AM           Anesthesia Evaluation  Patient summary reviewed and Nursing notes reviewed   no history of anesthetic complications:   Airway: Mallampati: I  TM distance: >3 FB   Neck ROM: full  Mouth opening: > = 3 FB   Dental:          Pulmonary:   (+)  COPD:      decreased breath sounds        (-) not a current smoker                           Cardiovascular:  Exercise tolerance: poor (<4 METS)  (+) hypertension:, past MI:, CAD:, CABG/stent:, CHF:, hyperlipidemia        Rhythm: regular  Rate: normal                 ROS comment: MI 8

## 2025-07-09 NOTE — CARE COORDINATION
Plan for patient to return to [facility] at [place], Per My at [facility] patient is a LTC bedhold there and can return.  Patient does not have any Medicare days left and is private paying, and Medicaid pending there.  NO PRECERT to return. Will need KELLY signed.

## 2025-07-09 NOTE — TELEPHONE ENCOUNTER
Regarding Cardiac Rehab.  Spoke with Yanni his nurse at the Nursing [facility].  He is currently in the hospital and not doing well.  They will call if they need to schedule.

## 2025-07-09 NOTE — PLAN OF CARE
Problem: Chronic Conditions and Co-morbidities  Goal: Patient's chronic conditions and co-morbidity symptoms are monitored and maintained or improved  7/9/2025 1200 by Nina Wayne RN  Outcome: Progressing  7/9/2025 0529 by Sis Pastor RN  Outcome: Progressing     Problem: Discharge Planning  Goal: Discharge to home or other facility with appropriate resources  7/9/2025 1200 by Nina Wayne RN  Outcome: Progressing  7/9/2025 0529 by Sis Pastor RN  Outcome: Progressing     Problem: Safety - Adult  Goal: Free from fall injury  7/9/2025 1200 by Nina Wayne RN  Outcome: Progressing  7/9/2025 0529 by Sis Pastor RN  Outcome: Progressing     Problem: Pain  Goal: Verbalizes/displays adequate comfort level or baseline comfort level  7/9/2025 1200 by Nina Wayne RN  Outcome: Progressing  7/9/2025 0529 by Sis Pastor RN  Outcome: Progressing     Problem: Skin/Tissue Integrity  Goal: Skin integrity remains intact  Description: 1.  Monitor for areas of redness and/or skin breakdown  2.  Assess vascular access sites hourly  3.  Every 4-6 hours minimum:  Change oxygen saturation probe site  4.  Every 4-6 hours:  If on nasal continuous positive airway pressure, respiratory therapy assess nares and determine need for appliance change or resting period  Outcome: Progressing

## 2025-07-09 NOTE — ANESTHESIA POSTPROCEDURE EVALUATION
Department of Anesthesiology  Postprocedure Note    Patient: Jerome Steel  MRN: [id number]  YOB: 1960  Date of evaluation: 7/9/2025    Procedure Summary       Date: 07/09/25 Room / Location: [place] [id number] / [facility]    Anesthesia Start: 1458 Anesthesia Stop:     Procedure: TRANSESOPHAGEAL ECHOCARDIOGRAM WITH BUBBLE STUDY Diagnosis:       Endocarditis, unspecified chronicity, unspecified endocarditis type      (Endocarditis, unspecified chronicity, unspecified endocarditis type [I38])    Surgeons: Avril Taylor DO Responsible Provider: Jerome Shaw DO    Anesthesia Type: MAC ASA Status: 4            Anesthesia Type: No value filed.    Stephen Phase I:      Stephen Phase II: Stephen Score: 10    Anesthesia Post Evaluation    Patient location during evaluation: PACU  Patient participation: complete - patient participated  Level of consciousness: awake and alert  Pain score: 0  Airway patency: patent  Nausea & Vomiting: no nausea and no vomiting  Cardiovascular status: blood pressure returned to baseline and hemodynamically stable  Respiratory status: acceptable  Hydration status: stable  Pain management: adequate    No notable events documented.

## 2025-07-09 NOTE — PROGRESS NOTES
[facility] Hospitalist   Progress Note    Admitting Date and Time: 7/7/2025  1:45 PM  Admit Dx: Colitis [K52.9]  Leukocytosis, unspecified type [D72.829]    Subjective:    Patient was admitted with Colitis [K52.9]  Leukocytosis, unspecified type [D72.829]. Patient denies fever, chills, cp, sob, n/v.     magnesium sulfate  2,000 mg IntraVENous Once    sodium phosphate IVPB (PERIPHERAL line)  15 mmol IntraVENous Once    aspirin  81 mg Oral Daily    atorvastatin  40 mg Oral Nightly    escitalopram  5 mg Oral Daily    famotidine  40 mg Oral Daily    folic acid  1,000 mcg Oral Daily    ferrous sulfate  325 mg Oral Daily with breakfast    insulin glargine  5 Units SubCUTAneous Nightly    metoprolol succinate  12.5 mg Oral Daily    pantoprazole  40 mg Oral Daily    tamsulosin  0.4 mg Oral Nightly    sodium chloride flush  5-40 mL IntraVENous 2 times per day    enoxaparin  40 mg SubCUTAneous Daily    insulin lispro  0-4 Units SubCUTAneous 4x Daily AC & HS    Fidaxomicin  200 mg Oral BID    lactobacillus  1 capsule Oral Q12H    cefTRIAXone (ROCEPHIN) IV  2,000 mg IntraVENous Q24H     hydrOXYzine HCl, 50 mg, TID PRN  oxyCODONE-acetaminophen, 1 tablet, Q4H PRN  sodium chloride flush, 5-40 mL, PRN  sodium chloride, , PRN  ondansetron, 4 mg, Q8H PRN   Or  ondansetron, 4 mg, Q6H PRN  polyethylene glycol, 17 g, Daily PRN  acetaminophen, 650 mg, Q6H PRN   Or  acetaminophen, 650 mg, Q6H PRN  glucose, 4 tablet, PRN  dextrose bolus, 125 mL, PRN   Or  dextrose bolus, 250 mL, PRN  glucagon (rDNA), 1 mg, PRN  dextrose, , Continuous PRN         Objective:    /60   Pulse 96   Temp 97.8 °F (36.6 °C) (Oral)   Resp 14   Ht 1.803 m (5' 11\")   Wt 60.8 kg (134 lb)   SpO2 99%   BMI 18.69 kg/m²   Skin: warm and dry, no rash or erythema  Pulmonary/Chest: clear to auscultation bilaterally- no wheezes, rales or rhonchi, normal air movement, no respiratory distress  Cardiovascular: rhythm reg at rate of 98  Abdomen:

## 2025-07-10 VITALS
WEIGHT: 134 LBS | RESPIRATION RATE: 18 BRPM | DIASTOLIC BLOOD PRESSURE: 59 MMHG | SYSTOLIC BLOOD PRESSURE: 112 MMHG | HEIGHT: 71 IN | OXYGEN SATURATION: 100 % | BODY MASS INDEX: 18.76 KG/M2 | HEART RATE: 82 BPM | TEMPERATURE: 98.8 F

## 2025-07-10 PROBLEM — D72.829 LEUKOCYTOSIS: Status: ACTIVE | Noted: 2025-07-10

## 2025-07-10 LAB
ANION GAP SERPL CALCULATED.3IONS-SCNC: 10 MMOL/L (ref 7–16)
BASOPHILS # BLD: 0 K/UL (ref 0–0.2)
BASOPHILS NFR BLD: 0 % (ref 0–2)
BUN SERPL-MCNC: 18 MG/DL (ref 8–23)
CALCIUM SERPL-MCNC: 7.3 MG/DL (ref 8.8–10.2)
CHLORIDE SERPL-SCNC: 104 MMOL/L (ref 98–107)
CO2 SERPL-SCNC: 18 MMOL/L (ref 22–29)
CREAT SERPL-MCNC: 0.6 MG/DL (ref 0.7–1.2)
EOSINOPHIL # BLD: 0 K/UL (ref 0.05–0.5)
EOSINOPHILS RELATIVE PERCENT: 0 % (ref 0–6)
ERYTHROCYTE [DISTWIDTH] IN BLOOD BY AUTOMATED COUNT: 17.2 % (ref 11.5–15)
GFR, ESTIMATED: >90 ML/MIN/1.73M2
GLUCOSE BLD-MCNC: 104 MG/DL (ref 74–99)
GLUCOSE BLD-MCNC: 85 MG/DL (ref 74–99)
GLUCOSE BLD-MCNC: 90 MG/DL (ref 74–99)
GLUCOSE SERPL-MCNC: 88 MG/DL (ref 74–99)
HCT VFR BLD AUTO: 25 % (ref 37–54)
HGB BLD-MCNC: 8.3 G/DL (ref 12.5–16.5)
LYMPHOCYTES NFR BLD: 0.2 K/UL (ref 1.5–4)
LYMPHOCYTES RELATIVE PERCENT: 1 % (ref 20–42)
MAGNESIUM SERPL-MCNC: 1.5 MG/DL (ref 1.6–2.4)
MCH RBC QN AUTO: 27 PG (ref 26–35)
MCHC RBC AUTO-ENTMCNC: 33.2 G/DL (ref 32–34.5)
MCV RBC AUTO: 81.4 FL (ref 80–99.9)
METAMYELOCYTES ABSOLUTE COUNT: 0.4 K/UL (ref 0–0.12)
METAMYELOCYTES: 2 % (ref 0–1)
MICROORGANISM SPEC CULT: ABNORMAL
MICROORGANISM SPEC CULT: NORMAL
MICROORGANISM SPEC CULT: NORMAL
MONOCYTES NFR BLD: 0.6 K/UL (ref 0.1–0.95)
MONOCYTES NFR BLD: 3 % (ref 2–12)
MYELOCYTES ABSOLUTE COUNT: 0.4 K/UL
MYELOCYTES: 2 %
NEUTROPHILS NFR BLD: 92 % (ref 43–80)
NEUTS SEG NFR BLD: 18.31 K/UL (ref 1.8–7.3)
PHOSPHATE SERPL-MCNC: 2.3 MG/DL (ref 2.5–4.5)
PLATELET # BLD AUTO: 244 K/UL (ref 130–450)
PMV BLD AUTO: 9 FL (ref 7–12)
POTASSIUM SERPL-SCNC: 3.2 MMOL/L (ref 3.5–5.1)
RBC # BLD AUTO: 3.07 M/UL (ref 3.8–5.8)
SERVICE CMNT-IMP: ABNORMAL
SODIUM SERPL-SCNC: 133 MMOL/L (ref 136–145)
SPECIMEN DESCRIPTION: ABNORMAL
SPECIMEN DESCRIPTION: NORMAL
WBC OTHER # BLD: 19.9 K/UL (ref 4.5–11.5)

## 2025-07-10 PROCEDURE — 6370000000 HC RX 637 (ALT 250 FOR IP): Performed by: INTERNAL MEDICINE

## 2025-07-10 PROCEDURE — 84100 ASSAY OF PHOSPHORUS: CPT

## 2025-07-10 PROCEDURE — 83735 ASSAY OF MAGNESIUM: CPT

## 2025-07-10 PROCEDURE — 80048 BASIC METABOLIC PNL TOTAL CA: CPT

## 2025-07-10 PROCEDURE — 6360000002 HC RX W HCPCS: Performed by: INTERNAL MEDICINE

## 2025-07-10 PROCEDURE — 6360000002 HC RX W HCPCS: Performed by: SPECIALIST

## 2025-07-10 PROCEDURE — 85025 COMPLETE CBC W/AUTO DIFF WBC: CPT

## 2025-07-10 PROCEDURE — 99239 HOSP IP/OBS DSCHRG MGMT >30: CPT | Performed by: INTERNAL MEDICINE

## 2025-07-10 PROCEDURE — 6370000000 HC RX 637 (ALT 250 FOR IP): Performed by: SPECIALIST

## 2025-07-10 PROCEDURE — 82962 GLUCOSE BLOOD TEST: CPT

## 2025-07-10 PROCEDURE — 2500000003 HC RX 250 WO HCPCS: Performed by: INTERNAL MEDICINE

## 2025-07-10 PROCEDURE — 2580000003 HC RX 258: Performed by: INTERNAL MEDICINE

## 2025-07-10 PROCEDURE — 36592 COLLECT BLOOD FROM PICC: CPT

## 2025-07-10 RX ORDER — LANOLIN ALCOHOL/MO/W.PET/CERES
400 CREAM (GRAM) TOPICAL 2 TIMES DAILY
Qty: 4 TABLET | Refills: 0 | Status: SHIPPED | OUTPATIENT
Start: 2025-07-10 | End: 2025-07-12

## 2025-07-10 RX ORDER — POTASSIUM CHLORIDE 1.5 G/1.58G
40 POWDER, FOR SOLUTION ORAL 2 TIMES DAILY
Qty: 30 EACH | Refills: 0 | Status: SHIPPED | OUTPATIENT
Start: 2025-07-10

## 2025-07-10 RX ORDER — LANOLIN ALCOHOL/MO/W.PET/CERES
400 CREAM (GRAM) TOPICAL DAILY
Status: DISCONTINUED | OUTPATIENT
Start: 2025-07-10 | End: 2025-07-10 | Stop reason: HOSPADM

## 2025-07-10 RX ADMIN — Medication 400 MG: at 11:49

## 2025-07-10 RX ADMIN — FERROUS SULFATE TAB 325 MG (65 MG ELEMENTAL FE) 325 MG: 325 (65 FE) TAB at 09:03

## 2025-07-10 RX ADMIN — ESCITALOPRAM OXALATE 5 MG: 10 TABLET ORAL at 09:03

## 2025-07-10 RX ADMIN — SODIUM CHLORIDE, PRESERVATIVE FREE 10 ML: 5 INJECTION INTRAVENOUS at 09:04

## 2025-07-10 RX ADMIN — METOPROLOL SUCCINATE 12.5 MG: 25 TABLET, EXTENDED RELEASE ORAL at 09:03

## 2025-07-10 RX ADMIN — ONDANSETRON 4 MG: 2 INJECTION, SOLUTION INTRAMUSCULAR; INTRAVENOUS at 09:02

## 2025-07-10 RX ADMIN — FOLIC ACID 1000 MCG: 1 TABLET ORAL at 09:03

## 2025-07-10 RX ADMIN — ENOXAPARIN SODIUM 40 MG: 100 INJECTION SUBCUTANEOUS at 09:04

## 2025-07-10 RX ADMIN — OXYCODONE AND ACETAMINOPHEN 1 TABLET: 5; 325 TABLET ORAL at 19:28

## 2025-07-10 RX ADMIN — FIDAXOMICIN 200 MG: 200 TABLET, FILM COATED ORAL at 09:02

## 2025-07-10 RX ADMIN — SODIUM CHLORIDE: 0.9 INJECTION, SOLUTION INTRAVENOUS at 09:01

## 2025-07-10 RX ADMIN — METRONIDAZOLE 500 MG: 500 INJECTION, SOLUTION INTRAVENOUS at 11:56

## 2025-07-10 RX ADMIN — Medication 1 CAPSULE: at 09:03

## 2025-07-10 RX ADMIN — DIBASIC SODIUM PHOSPHATE, MONOBASIC POTASSIUM PHOSPHATE AND MONOBASIC SODIUM PHOSPHATE 1 TABLET: 852; 155; 130 TABLET ORAL at 11:49

## 2025-07-10 RX ADMIN — FAMOTIDINE 40 MG: 20 TABLET, FILM COATED ORAL at 09:03

## 2025-07-10 RX ADMIN — METRONIDAZOLE 500 MG: 500 INJECTION, SOLUTION INTRAVENOUS at 05:09

## 2025-07-10 RX ADMIN — HYDROXYZINE HYDROCHLORIDE 50 MG: 25 TABLET ORAL at 09:04

## 2025-07-10 RX ADMIN — ASPIRIN 81 MG: 81 TABLET, COATED ORAL at 09:03

## 2025-07-10 RX ADMIN — ACETAMINOPHEN 650 MG: 325 TABLET ORAL at 11:20

## 2025-07-10 RX ADMIN — PANTOPRAZOLE SODIUM 40 MG: 40 TABLET, DELAYED RELEASE ORAL at 09:03

## 2025-07-10 ASSESSMENT — PAIN SCALES - GENERAL
PAINLEVEL_OUTOF10: 0
PAINLEVEL_OUTOF10: 6
PAINLEVEL_OUTOF10: 4
PAINLEVEL_OUTOF10: 6

## 2025-07-10 ASSESSMENT — PAIN DESCRIPTION - LOCATION
LOCATION: SHOULDER
LOCATION: GENERALIZED

## 2025-07-10 NOTE — DISCHARGE SUMMARY
CONTINUE these medications which have NOT CHANGED    Details   Zinc Oxide 10 % OINT Apply 1 Application topically in the morning and at bedtime Apply to buttocks and heels      docusate sodium (COLACE) 100 MG capsule Take 1 capsule by mouth 2 times daily      bisacodyl (DULCOLAX) 5 MG EC tablet Take 1 tablet by mouth daily as needed for Constipation      escitalopram (LEXAPRO) 5 MG tablet Take 1 tablet by mouth daily      acetaminophen (TYLENOL) 325 MG tablet Take 2 tablets by mouth every 6 hours as needed for Pain      polyethylene glycol (GLYCOLAX) 17 g packet Take 1 packet by mouth daily as needed for Constipation      melatonin 3 MG TABS tablet Take 1 tablet by mouth nightly as needed (Insomnia)      benzocaine-menthol (CEPACOL SORE THROAT) 15-3.6 MG lozenge Take 1 lozenge by mouth every 2 hours as needed for Sore Throat      insulin glargine (LANTUS) 100 UNIT/ML injection vial Inject 5 Units into the skin nightly      insulin lispro (HUMALOG,ADMELOG) 100 UNIT/ML SOLN injection vial Inject 0-6 Units into the skin 4 times daily (before meals and nightly) **Corrective Low Dose Algorithm**  Glucose: Dose:             No Insulin  150-200 1 Unit  201-250 2 Units  251-300 3 Units  301-350 4 Units  351-400 5 Units  Over 400 6 Units      aspirin 81 MG EC tablet Take 1 tablet by mouth daily  Qty: 30 tablet, Refills: 3      atorvastatin (LIPITOR) 40 MG tablet Take 1 tablet by mouth nightly  Qty: 30 tablet, Refills: 3      metoprolol succinate (TOPROL XL) 25 MG extended release tablet Take 0.5 tablets by mouth daily  Qty: 30 tablet, Refills: 3      ascorbic acid (VITAMIN C) 500 MG tablet Take 1 tablet by mouth 2 times daily  Qty: 60 tablet, Refills: 0      famotidine (PEPCID) 40 MG tablet Take 1 tablet by mouth daily  Qty: 90 tablet, Refills: 1      tamsulosin (FLOMAX) 0.4 MG capsule Take 1 capsule by mouth nightly  Qty: 90 capsule, Refills: 1      carboxymethylcellulose (ARTIFICIAL TEARS) 1 % ophthalmic solution

## 2025-07-10 NOTE — CARE COORDINATION
DC order noted, Patient to return to [place] at [facility] via [facility].  Notified My at [place] via [facility].  Patient does not have any Medicare days left and is private paying, and Medicaid pending there. Will need KELLY signed.

## 2025-07-10 NOTE — PLAN OF CARE
Problem: Chronic Conditions and Co-morbidities  Goal: Patient's chronic conditions and co-morbidity symptoms are monitored and maintained or improved  7/10/2025 1028 by Dorota Baltazar, RN  Outcome: Progressing  Flowsheets (Taken 7/10/2025 0830)  Care Plan - Patient's Chronic Conditions and Co-Morbidity Symptoms are Monitored and Maintained or Improved: Monitor and assess patient's chronic conditions and comorbid symptoms for stability, deterioration, or improvement     Problem: Discharge Planning  Goal: Discharge to home or other facility with appropriate resources  7/10/2025 1028 by Dorota Baltazar, RN  Outcome: Progressing  Flowsheets (Taken 7/10/2025 0830)  Discharge to home or other facility with appropriate resources: Identify barriers to discharge with patient and caregiver     Problem: Safety - Adult  Goal: Free from fall injury  7/10/2025 0645 by Mik Stevens RN  Outcome: Progressing     Problem: Pain  Goal: Verbalizes/displays adequate comfort level or baseline comfort level  Outcome: Progressing  Flowsheets (Taken 7/10/2025 0915)  Verbalizes/displays adequate comfort level or baseline comfort level:   Encourage patient to monitor pain and request assistance   Assess pain using appropriate pain scale     Problem: Skin/Tissue Integrity  Goal: Skin integrity remains intact  Description: 1.  Monitor for areas of redness and/or skin breakdown  2.  Assess vascular access sites hourly  3.  Every 4-6 hours minimum:  Change oxygen saturation probe site  4.  Every 4-6 hours:  If on nasal continuous positive airway pressure, respiratory therapy assess nares and determine need for appliance change or resting period  Outcome: Progressing  Flowsheets (Taken 7/10/2025 0830)  Skin Integrity Remains Intact: Monitor for areas of redness and/or skin breakdown

## 2025-07-10 NOTE — PROGRESS NOTES
Nurse to nurse report called to [facility] facility, known to patient-spoke to Yanni. All questions answered, orders faxed as requested by .

## 2025-07-10 NOTE — PROGRESS NOTES
Progress Note  Date:7/10/2025       Room:37/0637-01  Patient Name:Jerome Steel     YOB: 1960     Age:64 y.o.        Subjective    Subjective  07/10/25 in bed has no c/o has taylor   7/9  In bed has telesitter   Review of Systems better mentation   Meds:  phosphorus (K PHOS NEUTRAL) 1 tablet, BID  magnesium oxide (MAG-OX) tablet 400 mg, Daily  metroNIDAZOLE (FLAGYL) 500 mg in 0.9% NaCl 100 mL IVPB premix, Q8H  aspirin EC tablet 81 mg, Daily  atorvastatin (LIPITOR) tablet 40 mg, Nightly  escitalopram (LEXAPRO) tablet 5 mg, Daily  famotidine (PEPCID) tablet 40 mg, Daily  folic acid (FOLVITE) tablet 1,000 mcg, Daily  ferrous sulfate (IRON 325) tablet 325 mg, Daily with breakfast  hydrOXYzine HCl (ATARAX) tablet 50 mg, TID PRN  insulin glargine (LANTUS) injection vial 5 Units, Nightly  metoprolol succinate (TOPROL XL) extended release tablet 12.5 mg, Daily  pantoprazole (PROTONIX) tablet 40 mg, Daily  oxyCODONE-acetaminophen (PERCOCET) 5-325 MG per tablet 1 tablet, Q4H PRN  tamsulosin (FLOMAX) capsule 0.4 mg, Nightly  sodium chloride flush 0.9 % injection 5-40 mL, 2 times per day  sodium chloride flush 0.9 % injection 5-40 mL, PRN  0.9 % sodium chloride infusion, PRN  enoxaparin (LOVENOX) injection 40 mg, Daily  ondansetron (ZOFRAN-ODT) disintegrating tablet 4 mg, Q8H PRN   Or  ondansetron (ZOFRAN) injection 4 mg, Q6H PRN  polyethylene glycol (GLYCOLAX) packet 17 g, Daily PRN  acetaminophen (TYLENOL) tablet 650 mg, Q6H PRN   Or  acetaminophen (TYLENOL) suppository 650 mg, Q6H PRN  glucose chewable tablet 16 g, PRN  dextrose bolus 10% 125 mL, PRN   Or  dextrose bolus 10% 250 mL, PRN  glucagon injection 1 mg, PRN  dextrose 10 % infusion, Continuous PRN  0.9 % sodium chloride infusion, Continuous  insulin lispro (HUMALOG,ADMELOG) injection vial 0-4 Units, 4x Daily AC & HS  Fidaxomicin (DIFICID) tablet 200 mg, BID  lactobacillus (CULTURELLE) capsule 1 capsule, Q12H       Objective         Vitals Last

## 2025-07-12 LAB
MICROORGANISM SPEC CULT: NORMAL
MICROORGANISM SPEC CULT: NORMAL
SERVICE CMNT-IMP: NORMAL
SERVICE CMNT-IMP: NORMAL
SPECIMEN DESCRIPTION: NORMAL
SPECIMEN DESCRIPTION: NORMAL

## 2025-08-29 ENCOUNTER — LAB REQUISITION (OUTPATIENT)
Dept: LAB | Facility: HOSPITAL | Age: 65
End: 2025-08-29
Payer: COMMERCIAL

## (undated) DEVICE — DECANTER BAG 9": Brand: MEDLINE INDUSTRIES, INC.

## (undated) DEVICE — BANDAGE COMPR M W4INXL10YD WHT BGE VELC E MTRX HK AND LOOP

## (undated) DEVICE — GAUZE,SPONGE,4"X4",16PLY,STRL,LF,10/TRAY: Brand: MEDLINE

## (undated) DEVICE — LUBRICANT SURG JELLY ST BACTER TUBE 4.25OZ

## (undated) DEVICE — PEN: MARKING STD 100/CS: Brand: MEDICAL ACTION INDUSTRIES

## (undated) DEVICE — DRESSING GZ W1XL8IN COT XRFRM N ADH OVERWRAP CURAD

## (undated) DEVICE — STABILIZER SURG VAC OFF-PUMP SYS ACROBAT SUV

## (undated) DEVICE — Device

## (undated) DEVICE — APPLICATOR MEDICATED 26 CC SOLUTION HI LT ORNG CHLORAPREP

## (undated) DEVICE — BLOCK BITE 60FR CAREGUARD

## (undated) DEVICE — TUBING, SUCTION, 3/16" X 12', STRAIGHT: Brand: MEDLINE

## (undated) DEVICE — SHUNT SURG INTRALUMINAL 1.50 MM SIL STRL FLO-THRU LF DISP

## (undated) DEVICE — SYRINGE IRRIG 60ML SFT PLIABLE BLB EZ TO GRP 1 HND USE W/

## (undated) DEVICE — INTENDED FOR TISSUE SEPARATION, AND OTHER PROCEDURES THAT REQUIRE A SHARP SURGICAL BLADE TO PUNCTURE OR CUT.: Brand: BARD-PARKER ® STAINLESS STEEL BLADES

## (undated) DEVICE — CATHETER GUID ANGLED 0.056 INX135CM SUPP BRAIDED TRAILBLAZER

## (undated) DEVICE — SYRINGE MED 10ML LUERLOCK TIP W/O SFTY DISP

## (undated) DEVICE — SET SURG INSTR PODI

## (undated) DEVICE — CONTAINER SPEC COLL 960ML POLYPR TRIANG GRAD INTAKE/OUTPUT

## (undated) DEVICE — GUIDEWIRE, INQWIRE, 3MM J, .035 X 210CM, FIXED

## (undated) DEVICE — SYSTEM SEAL 4.3MM PROX HEMSTAT HEARTSTRING III

## (undated) DEVICE — BANDAGE,GAUZE,4.5"X4.1YD,STERILE,LF: Brand: MEDLINE

## (undated) DEVICE — GLOVE SURG SZ 65 THK91MIL LTX FREE SYN POLYISOPRENE

## (undated) DEVICE — JELLY,LUBE,STERILE,FLIP TOP,TUBE,4-OZ: Brand: MEDLINE

## (undated) DEVICE — BASIC SINGLE BASIN 1-LF: Brand: MEDLINE INDUSTRIES, INC.

## (undated) DEVICE — BNDG,ELSTC,MATRIX,STRL,4"X5YD,LF,HOOK&LP: Brand: MEDLINE

## (undated) DEVICE — CATHETER, OPTITORQUE, 6FR 100CM, TIGER RADIAL 4.0

## (undated) DEVICE — SWAB SPEC COLL SHFT L5.25IN POLYUR FOAM TIP SFT DBL MEDIA

## (undated) DEVICE — MICROPUNCTURE INTRODUCER SET SILHOUETTE TRANSITIONLESS PUSH-PLUS DESIGN - STIFFENED CANNULA WITH STAINLESS STEEL WIRE GUIDE: Brand: MICROPUNCTURE

## (undated) DEVICE — TUBING, SUCTION, 1/4" X 10', STRAIGHT: Brand: MEDLINE

## (undated) DEVICE — MASK,FACE,MAXFLUIDPROTECT,SHIELD/ERLPS: Brand: MEDLINE

## (undated) DEVICE — PADDING,UNDERCAST,COTTON, 4"X4YD STERILE: Brand: MEDLINE

## (undated) DEVICE — CANNULA NSL CANN NSL L25FT TBNG AD O2 SUP SFT UC

## (undated) DEVICE — SUMP INTCARD SUCT AD 20FR PERICARD MAYO STYL FLX VERSATILE

## (undated) DEVICE — CATHETER, DIAGNOSTIC, DXTERITY, 5 FR JR 4.0, 100CM

## (undated) DEVICE — TOWEL,OR,DSP,ST,BLUE,STD,6/PK,12PK/CS: Brand: MEDLINE

## (undated) DEVICE — COVER,LIGHT HANDLE,FLX,1/PK: Brand: MEDLINE INDUSTRIES, INC.

## (undated) DEVICE — 6 X 9  1.75MIL 4-WALL LABGUARD: Brand: MINIGRIP COMMERCIAL LLC

## (undated) DEVICE — PINNACLE INTRODUCER SHEATH: Brand: PINNACLE

## (undated) DEVICE — Device: Brand: DEFENDO VALVE AND CONNECTOR KIT

## (undated) DEVICE — SPONGE,PEANUT,XRAY,ST,SM,3/8",5/CARD: Brand: MEDLINE INDUSTRIES, INC.

## (undated) DEVICE — MARKER,SKIN,WI/RULER AND LABELS: Brand: MEDLINE

## (undated) DEVICE — KIT BEDSIDE REVITAL OX 500ML

## (undated) DEVICE — CATHETER THOR 32FR L23IN PVC 5 EYELET STR ATRAUM

## (undated) DEVICE — Z DISCONTINUED GLOVE SURG SZ 7 L12IN FNGR THK13MIL WHT ISOLEX POLYISOPRENE

## (undated) DEVICE — CANNULA INJ L2.5IN BLNT TIP 3MM CLR BODY W/ 1 W VLV DLP

## (undated) DEVICE — PENCIL ES L3M BTTN SWCH HOLSTER W/ BLDE ELECTRD EDGE

## (undated) DEVICE — HI-TORQUE BALANCE MIDDLEWEIGHT GUIDE WIRE W/HYDROCOAT .014 STRAIGHT TIP 3.0 CM X 190 CM: Brand: HI-TORQUE BALANCE MIDDLEWEIGHT

## (undated) DEVICE — DRAIN,WOUND,ROUND,24FR,5/16",FULL-FLUTED: Brand: MEDLINE

## (undated) DEVICE — FORCEPS BX L240CM JAW DIA2.8MM L CAP W/ NDL MIC MESH TOOTH

## (undated) DEVICE — SHEATH, PINNACLE, 10 CM,  6FR INTRODUCER, 6FR DIA, 2.5 CM DIALATOR

## (undated) DEVICE — OPTIVIEW, SACRUM, 9"X9": Brand: MEDLINE

## (undated) DEVICE — SWAN-GANZ TRUE SIZE THERMODULTION CATHETER, 5F: Brand: SWAN-GANZ TRUE SIZE

## (undated) DEVICE — DRAIN SURG SGL COLL PT TB FOR ATS BG OASIS

## (undated) DEVICE — SET SURG BASIN MAYO REUSABLE

## (undated) DEVICE — BANDAGE,SELF ADHRNT,COFLEX,4"X5YD,STRL: Brand: COLABEL

## (undated) DEVICE — CATHETER,URETHRAL,REDRUBBER,STRL,18FR: Brand: MEDLINE

## (undated) DEVICE — 1LYRTR 16FR10ML100%SIL UMS SNP: Brand: MEDLINE INDUSTRIES, INC.

## (undated) DEVICE — CANNULA IV 18GA L15IN BLNT FILL LUERLOCK HUB MJCT

## (undated) DEVICE — ALCOHOL RUBBING ISO 16OZ 70%

## (undated) DEVICE — BANDAGE COMPR W4INXL10YD WHITE/BEIGE E MTRX HK LOOP CLSR

## (undated) DEVICE — ELECTRODE PT RET AD L9FT HI MOIST COND ADH HYDRGEL CORDED

## (undated) DEVICE — CO-SET DELIVERY SYSTEM FOR 123 ROOM TEMPATURE INJECTATE: Brand: CO-SET+

## (undated) DEVICE — CATHETER ANGIO MOD HK 2 0.035 IN AD 5 FRX80 CM IMPRESS

## (undated) DEVICE — SOLUTION IV MULT ELECLYT 1000 ML PH 7.4 INJ ISOLYTE S

## (undated) DEVICE — SOLUTION IV 100ML 0.9% SOD CHL PLAS CONT USP VIAFLX 1 PER

## (undated) DEVICE — TRAILBLAZER L135CM OD2.6FR ID0.018IN GUID SHTH DIA5FR

## (undated) DEVICE — YANKAUER,BULB TIP,W/O VENT,RIGID,STERILE: Brand: MEDLINE

## (undated) DEVICE — GLOVE ORANGE PI 7 1/2   MSG9075

## (undated) DEVICE — CANISTER NEG PRSS 1000ML W/ GEL INFOVAC

## (undated) DEVICE — STERILE LATEX POWDER FREE SURGICAL GLOVES WITH HYDROGEL COATING: Brand: PROTEXIS

## (undated) DEVICE — NEEDLE HYPO 25GA L1.5IN BLU POLYPR HUB S STL REG BVL STR

## (undated) DEVICE — SHUNT SURG INTRALUMINAL 1.75 MM SIL STRL FLO-THRU LF DISP

## (undated) DEVICE — SET INSUF TUBE HEAT ISO CONN DISP

## (undated) DEVICE — AORTIC PUNCHES ARE USED TO CREATE A UNIFORM OPENING IN BLOOD VESSELS DURING CARDIOVASCULAR SURGERY. THE VESSEL GRAFT IS INSERTED INTO THE CREATED OPENING AND SUTURED TO THE VESSEL WALL. AORTIC LANCETS ARE USED TO MAKE A SMALL UNIFORM CUT IN A BLOOD VESSEL TO FACILITATE INSERTION OF AN AORTIC PUNCH.  PUNCHES COME IN VARIOUS LENGTHS, DIAMETERS AND TIP CONFIGURATIONS.: Brand: CLEANCUT ROTATING AORTIC PUNCH

## (undated) DEVICE — 6 FOOT DISPOSABLE EXTENSION CABLE WITH SAFE CONNECT / SCREW-DOWN

## (undated) DEVICE — GLIDESHEATH SLENDER ACCESS KIT: Brand: GLIDESHEATH SLENDER

## (undated) DEVICE — NEEDLE JAMSH BNE MAR 13G X 2

## (undated) DEVICE — GLOVE ORTHO 7   MSG9470

## (undated) DEVICE — RADIFOCUS GLIDEWIRE ADVANTAGE GUIDEWIRE: Brand: GLIDEWIRE ADVANTAGE

## (undated) DEVICE — OPTISITE ARTERIAL PERFUSION CANNULA: Brand: OPTISITE ARTERIAL PERFUSION CANNULA

## (undated) DEVICE — SET MAJOR INSTR ORTHO

## (undated) DEVICE — ACCESS KIT, S-MAK MINI, 4FR 10CM 0.018IN 40CM, NT/PT, ECHO ENHANCE NEEDLE

## (undated) DEVICE — TRAP,MUCUS SPECIMEN,40CC: Brand: MEDLINE

## (undated) DEVICE — GOWN,SIRUS,NONRNF,SETINSLV,XL,20/CS: Brand: MEDLINE

## (undated) DEVICE — CHANNEL DRAIN, 28FR, HUBLESS: Brand: JACKSON-PRATT

## (undated) DEVICE — AGENT HEMOSTATIC SURG ORIGINAL ABS 4X8IN LOOSE KNIT 12/CA

## (undated) DEVICE — SPONGE LAP W18XL18IN WHT COT 4 PLY FLD STRUNG RADPQ DISP ST

## (undated) DEVICE — BLOWER COR ART L16.5CM PLAS SHFT MAL W/ MIST IV SET AXIUS

## (undated) DEVICE — STOPCOCK IV HI PRSS 1050PSI NDLLSS INJ 3 W LUER SWVL NUT

## (undated) DEVICE — SHUNT SURG INTRALUMINAL 2 MM SIL STRL FLO-THRU LF DISP

## (undated) DEVICE — KENDALL 450 SERIES MONITORING FOAM ELECTRODE - RECTANGULAR SHAPE ( 3/PK): Brand: KENDALL

## (undated) DEVICE — DOUBLE BASIN SET: Brand: MEDLINE INDUSTRIES, INC.

## (undated) DEVICE — GOWN,SIRUS,FABRNF,XL,20/CS: Brand: MEDLINE

## (undated) DEVICE — SPLINT KNEE UNIV FOR LESS THAN 36IN L24IN FOAM LAM ELAS CNTCT

## (undated) DEVICE — CATHETER, DIAGNOSTIC, DXTERITY, 6 FR, JL 4.0, 100C

## (undated) DEVICE — AVID DUAL STAGE VENOUS DRAINAGE CANNULA: Brand: AVID DUAL STAGE VENOUS DRAINAGE CANNULA

## (undated) DEVICE — GOWN ISOLATN REG YEL M WT MULTIPLY SIDETIE LEV 2

## (undated) DEVICE — CLOSURE DEVICE, VASCULAR, ANGIO-SEAL, VIP, 6FR, LF

## (undated) DEVICE — SYRINGE MEDICAL 3ML CLEAR PLASTIC STANDARD NON CONTROL LUERLOCK TIP DISPOSABLE

## (undated) DEVICE — SYRINGE MED 20ML STD CLR PLAS LUERLOCK TIP N CTRL DISP

## (undated) DEVICE — LOWER EXT KNEE DRAPE: Brand: MEDLINE INDUSTRIES, INC.

## (undated) DEVICE — CATHETER GUID OTW 0.035 IN 6 FRX135 CM 5 FR TRAILBLAZER

## (undated) DEVICE — INFLATION DEVICE KIT: Brand: ENCORE™ 26 ADVANTAGE KIT

## (undated) DEVICE — GLIDESHEATH NITINOL HYDROPHILIC COATED INTRODUCER SHEATH: Brand: GLIDESHEATH

## (undated) DEVICE — CLIP LIG M BLU TI HRT SHP WIRE HORZ 24 CLIPS/PK 25PK/CA

## (undated) DEVICE — BNDG,ELSTC,MATRIX,STRL,6"X5YD,LF,HOOK&LP: Brand: MEDLINE

## (undated) DEVICE — GLOVE ORANGE PI 7   MSG9070

## (undated) DEVICE — GAUZE,SPONGE,4"X4",16PLY,XRAY,STRL,LF: Brand: MEDLINE

## (undated) DEVICE — POSITIONER SURG VAC ACROBAT-I

## (undated) DEVICE — PACK EXT II SIRUS

## (undated) DEVICE — SPECIMEN CUP W/LID: Brand: DEROYAL

## (undated) DEVICE — DRESSING WND VAC SM GRANUFOAM SENSATRAC

## (undated) DEVICE — GLOVE SURG SZ 6 THK91MIL LTX FREE SYN POLYISOPRENE ANTI

## (undated) DEVICE — PAD, DEFIB, ADULT, RADIOTRAN, PHYSIO, LO: Brand: MEDLINE

## (undated) DEVICE — CONNECTOR DRNGE W3/8-0.5XH3/16XL3/16IN 2:1 SIL CARD STR

## (undated) DEVICE — SYRINGE MED 10ML TRNSLUC BRL PLUNG BLK MRK POLYPR CTRL

## (undated) DEVICE — DRESSING NEG PRSS 13CM PREVENA

## (undated) DEVICE — PERCUTANEOUS INSERTION KIT-ARTERIAL: Brand: PERCUTANEOUS INSERTION KIT-ARTERIAL

## (undated) DEVICE — LIQUIBAND RAPID ADHESIVE 36/CS 0.8ML: Brand: MEDLINE

## (undated) DEVICE — CATHETER THOR 32FR L23IN PVC 6 EYELET STR ATRAUM

## (undated) DEVICE — HANDPIECE SET WITH BONE CLEANING TIP AND SUCTION TUBE: Brand: INTERPULSE

## (undated) DEVICE — GLOVE SURG SZ 7.5 L11.73IN FNGR THK9.8MIL STRW LTX POLYMER

## (undated) DEVICE — BATTERY PACK FOR VARISPEED: Brand: STRYKER VARISPEED

## (undated) DEVICE — GOWN,ISO,LVL2,SMS,THUMBLOOP,OVRHD,YL,REG: Brand: MEDLINE

## (undated) DEVICE — SPONGE GZ 4IN 4IN 4 PLY N WVN AVANT

## (undated) DEVICE — SHEATH, GLIDESHEATH, SLENDER, 6FR 10CM

## (undated) DEVICE — DRESSING PETRO W3XL8IN OIL EMUL N ADH GZ KNIT IMPREG CELOS

## (undated) DEVICE — TOWEL,OR,DSP,ST,WHITE,DLX,4/PK,20PK/CS: Brand: MEDLINE

## (undated) DEVICE — DRAPE THER FLUID WARMING 66X44 IN FLAT SLUSH DBL DISC ORS

## (undated) DEVICE — SOLUTION IRRIG 1000ML 09% SOD CHL USP PIC PLAS CONTAINER

## (undated) DEVICE — NDL CNTR 40CT FM MAG: Brand: MEDLINE INDUSTRIES, INC.

## (undated) DEVICE — FIXED CORE WIRE GUIDE STRAIGHT: Brand: COOK

## (undated) DEVICE — U-SHAPED STYLE, SILICONE (1 PER STERILE PKG): Brand: SCANLAN® A/C LOCATOR® GRAFT MARKER

## (undated) DEVICE — GOWN,SIRUS,FABRNF,L,20/CS: Brand: MEDLINE

## (undated) DEVICE — NEEDLE SPNL 20GA L3.5IN YEL HUB S STL REG WALL FIT STYL

## (undated) DEVICE — 24CM R-BAND RADIAL COMPRESSION DEVICE  (FORMERLY VASCULAR SOLUTIONS)

## (undated) DEVICE — DESTINATION RENAL GUIDING SHEATH: Brand: DESTINATION

## (undated) DEVICE — 1LYRTR 16FR10ML100%SILTMPS SNP: Brand: MEDLINE INDUSTRIES, INC.